# Patient Record
Sex: FEMALE | Race: BLACK OR AFRICAN AMERICAN | Employment: OTHER | URBAN - METROPOLITAN AREA
[De-identification: names, ages, dates, MRNs, and addresses within clinical notes are randomized per-mention and may not be internally consistent; named-entity substitution may affect disease eponyms.]

---

## 2017-01-27 ENCOUNTER — APPOINTMENT (OUTPATIENT)
Dept: INFUSION THERAPY | Age: 67
End: 2017-01-27
Payer: MEDICARE

## 2017-02-01 ENCOUNTER — HOSPITAL ENCOUNTER (OUTPATIENT)
Dept: LAB | Age: 67
Discharge: HOME | End: 2017-02-01
Payer: MEDICARE

## 2017-02-01 ENCOUNTER — HOSPITAL ENCOUNTER (OUTPATIENT)
Dept: INFUSION THERAPY | Age: 67
Discharge: HOME | End: 2017-02-01

## 2017-02-01 DIAGNOSIS — D50.0 IRON DEFICIENCY ANEMIA DUE TO CHRONIC BLOOD LOSS: Chronic | ICD-10-CM

## 2017-02-01 LAB
ALBUMIN SERPL BCP-MCNC: 3.5 G/DL (ref 3.2–4.6)
ALBUMIN/GLOB SERPL: 1 {RATIO} (ref 1.2–3.5)
ALP SERPL-CCNC: 68 U/L (ref 50–136)
ALT SERPL-CCNC: 22 U/L (ref 12–65)
ANION GAP BLD CALC-SCNC: 5 MMOL/L (ref 7–16)
AST SERPL W P-5'-P-CCNC: 16 U/L (ref 15–37)
BASOPHILS # BLD AUTO: 0.1 K/UL (ref 0–0.2)
BASOPHILS # BLD: 1 % (ref 0–2)
BILIRUB SERPL-MCNC: 0.7 MG/DL (ref 0.2–1.1)
BUN SERPL-MCNC: 32 MG/DL (ref 8–23)
CALCIUM SERPL-MCNC: 9.1 MG/DL (ref 8.3–10.4)
CHLORIDE SERPL-SCNC: 96 MMOL/L (ref 98–107)
CO2 SERPL-SCNC: 39 MMOL/L (ref 23–32)
CREAT SERPL-MCNC: 1.53 MG/DL (ref 0.6–1)
DIFFERENTIAL METHOD BLD: ABNORMAL
EOSINOPHIL # BLD: 0.1 K/UL (ref 0–0.8)
EOSINOPHIL NFR BLD: 2 % (ref 0.5–7.8)
ERYTHROCYTE [DISTWIDTH] IN BLOOD BY AUTOMATED COUNT: 15.2 % (ref 11.9–14.6)
FERRITIN SERPL-MCNC: 20 NG/ML (ref 8–388)
GLOBULIN SER CALC-MCNC: 3.6 G/DL (ref 2.3–3.5)
GLUCOSE SERPL-MCNC: 207 MG/DL (ref 65–100)
HCT VFR BLD AUTO: 35.3 % (ref 35.8–46.3)
HGB BLD-MCNC: 10.6 G/DL (ref 11.7–15.4)
IRON SATN MFR SERPL: 14 %
IRON SERPL-MCNC: 48 UG/DL (ref 35–150)
LYMPHOCYTES # BLD AUTO: 20 % (ref 13–44)
LYMPHOCYTES # BLD: 1.4 K/UL (ref 0.5–4.6)
MCH RBC QN AUTO: 28.1 PG (ref 26.1–32.9)
MCHC RBC AUTO-ENTMCNC: 30 G/DL (ref 31.4–35)
MCV RBC AUTO: 93.6 FL (ref 79.6–97.8)
MONOCYTES # BLD: 0.5 K/UL (ref 0.1–1.3)
MONOCYTES NFR BLD AUTO: 8 % (ref 4–12)
NEUTS SEG # BLD: 4.7 K/UL (ref 1.7–8.2)
NEUTS SEG NFR BLD AUTO: 70 % (ref 43–78)
NRBC # BLD: 0 K/UL (ref 0–0.2)
PLATELET # BLD AUTO: 193 K/UL (ref 150–450)
PMV BLD AUTO: 10 FL (ref 10.8–14.1)
POTASSIUM SERPL-SCNC: 4.4 MMOL/L (ref 3.5–5.1)
PROT SERPL-MCNC: 7.1 G/DL (ref 6.3–8.2)
RBC # BLD AUTO: 3.77 M/UL (ref 4.05–5.25)
SODIUM SERPL-SCNC: 140 MMOL/L (ref 136–145)
TIBC SERPL-MCNC: 355 UG/DL (ref 250–450)
WBC # BLD AUTO: 6.8 K/UL (ref 4.3–11.1)

## 2017-02-01 PROCEDURE — 36415 COLL VENOUS BLD VENIPUNCTURE: CPT | Performed by: NURSE PRACTITIONER

## 2017-02-01 PROCEDURE — 83540 ASSAY OF IRON: CPT | Performed by: NURSE PRACTITIONER

## 2017-02-01 PROCEDURE — 85025 COMPLETE CBC W/AUTO DIFF WBC: CPT | Performed by: NURSE PRACTITIONER

## 2017-02-01 PROCEDURE — 82728 ASSAY OF FERRITIN: CPT | Performed by: NURSE PRACTITIONER

## 2017-02-01 PROCEDURE — 80053 COMPREHEN METABOLIC PANEL: CPT | Performed by: NURSE PRACTITIONER

## 2017-02-15 ENCOUNTER — HOSPITAL ENCOUNTER (INPATIENT)
Age: 67
LOS: 1 days | Discharge: HOME HEALTH | DRG: 291 | End: 2017-02-18
Attending: EMERGENCY MEDICINE | Admitting: INTERNAL MEDICINE
Payer: MEDICARE

## 2017-02-15 ENCOUNTER — APPOINTMENT (OUTPATIENT)
Dept: GENERAL RADIOLOGY | Age: 67
DRG: 291 | End: 2017-02-15
Attending: EMERGENCY MEDICINE
Payer: MEDICARE

## 2017-02-15 DIAGNOSIS — R07.9 CHEST PAIN, UNSPECIFIED TYPE: Primary | ICD-10-CM

## 2017-02-15 DIAGNOSIS — R06.00 DYSPNEA, UNSPECIFIED TYPE: ICD-10-CM

## 2017-02-15 LAB
ALBUMIN SERPL BCP-MCNC: 3.5 G/DL (ref 3.2–4.6)
ALBUMIN/GLOB SERPL: 1 {RATIO} (ref 1.2–3.5)
ALP SERPL-CCNC: 64 U/L (ref 50–136)
ALT SERPL-CCNC: 54 U/L (ref 12–65)
ANION GAP BLD CALC-SCNC: 5 MMOL/L (ref 7–16)
AST SERPL W P-5'-P-CCNC: 55 U/L (ref 15–37)
ATRIAL RATE: 62 BPM
BASOPHILS # BLD AUTO: 0 K/UL (ref 0–0.2)
BASOPHILS # BLD: 0 % (ref 0–2)
BILIRUB SERPL-MCNC: 0.8 MG/DL (ref 0.2–1.1)
BNP SERPL-MCNC: 621 PG/ML
BUN SERPL-MCNC: 33 MG/DL (ref 8–23)
CALCIUM SERPL-MCNC: 8.9 MG/DL (ref 8.3–10.4)
CALCULATED P AXIS, ECG09: 36 DEGREES
CALCULATED R AXIS, ECG10: -46 DEGREES
CALCULATED T AXIS, ECG11: 124 DEGREES
CHLORIDE SERPL-SCNC: 100 MMOL/L (ref 98–107)
CO2 SERPL-SCNC: 39 MMOL/L (ref 21–32)
CREAT SERPL-MCNC: 1.51 MG/DL (ref 0.6–1)
DIAGNOSIS, 93000: NORMAL
DIASTOLIC BP, ECG02: NORMAL MMHG
DIFFERENTIAL METHOD BLD: ABNORMAL
EOSINOPHIL # BLD: 0.1 K/UL (ref 0–0.8)
EOSINOPHIL NFR BLD: 1 % (ref 0.5–7.8)
ERYTHROCYTE [DISTWIDTH] IN BLOOD BY AUTOMATED COUNT: 15.4 % (ref 11.9–14.6)
GLOBULIN SER CALC-MCNC: 3.4 G/DL (ref 2.3–3.5)
GLUCOSE BLD STRIP.AUTO-MCNC: 117 MG/DL (ref 65–100)
GLUCOSE SERPL-MCNC: 200 MG/DL (ref 65–100)
HCT VFR BLD AUTO: 35 % (ref 35.8–46.3)
HGB BLD-MCNC: 10.6 G/DL (ref 11.7–15.4)
IMM GRANULOCYTES # BLD: 0 K/UL (ref 0–0.5)
IMM GRANULOCYTES NFR BLD AUTO: 0.2 % (ref 0–5)
INR PPP: 1.9 (ref 0.9–1.2)
LYMPHOCYTES # BLD AUTO: 18 % (ref 13–44)
LYMPHOCYTES # BLD: 1.1 K/UL (ref 0.5–4.6)
MCH RBC QN AUTO: 28.3 PG (ref 26.1–32.9)
MCHC RBC AUTO-ENTMCNC: 30.3 G/DL (ref 31.4–35)
MCV RBC AUTO: 93.3 FL (ref 79.6–97.8)
MONOCYTES # BLD: 0.5 K/UL (ref 0.1–1.3)
MONOCYTES NFR BLD AUTO: 8 % (ref 4–12)
NEUTS SEG # BLD: 4.5 K/UL (ref 1.7–8.2)
NEUTS SEG NFR BLD AUTO: 73 % (ref 43–78)
P-R INTERVAL, ECG05: 200 MS
PLATELET # BLD AUTO: 177 K/UL (ref 150–450)
PMV BLD AUTO: 10.6 FL (ref 10.8–14.1)
POTASSIUM SERPL-SCNC: 5 MMOL/L (ref 3.5–5.1)
PROT SERPL-MCNC: 6.9 G/DL (ref 6.3–8.2)
PROTHROMBIN TIME: 20.8 SEC (ref 9.6–12)
Q-T INTERVAL, ECG07: 410 MS
QRS DURATION, ECG06: 124 MS
QTC CALCULATION (BEZET), ECG08: 416 MS
RBC # BLD AUTO: 3.75 M/UL (ref 4.05–5.25)
SODIUM SERPL-SCNC: 144 MMOL/L (ref 136–145)
SYSTOLIC BP, ECG01: NORMAL MMHG
TROPONIN I BLD-MCNC: 0.04 NG/ML (ref 0–0.08)
TROPONIN I BLD-MCNC: 0.05 NG/ML (ref 0–0.08)
VENTRICULAR RATE, ECG03: 62 BPM
WBC # BLD AUTO: 6.2 K/UL (ref 4.3–11.1)

## 2017-02-15 PROCEDURE — 83880 ASSAY OF NATRIURETIC PEPTIDE: CPT | Performed by: EMERGENCY MEDICINE

## 2017-02-15 PROCEDURE — 99285 EMERGENCY DEPT VISIT HI MDM: CPT | Performed by: EMERGENCY MEDICINE

## 2017-02-15 PROCEDURE — 82962 GLUCOSE BLOOD TEST: CPT

## 2017-02-15 PROCEDURE — 74011000250 HC RX REV CODE- 250: Performed by: INTERNAL MEDICINE

## 2017-02-15 PROCEDURE — 74011250636 HC RX REV CODE- 250/636: Performed by: EMERGENCY MEDICINE

## 2017-02-15 PROCEDURE — 84484 ASSAY OF TROPONIN QUANT: CPT

## 2017-02-15 PROCEDURE — 77010033678 HC OXYGEN DAILY

## 2017-02-15 PROCEDURE — 94760 N-INVAS EAR/PLS OXIMETRY 1: CPT

## 2017-02-15 PROCEDURE — 74011250636 HC RX REV CODE- 250/636: Performed by: INTERNAL MEDICINE

## 2017-02-15 PROCEDURE — 93005 ELECTROCARDIOGRAM TRACING: CPT | Performed by: EMERGENCY MEDICINE

## 2017-02-15 PROCEDURE — 74011250637 HC RX REV CODE- 250/637: Performed by: INTERNAL MEDICINE

## 2017-02-15 PROCEDURE — 85610 PROTHROMBIN TIME: CPT | Performed by: INTERNAL MEDICINE

## 2017-02-15 PROCEDURE — 80053 COMPREHEN METABOLIC PANEL: CPT | Performed by: EMERGENCY MEDICINE

## 2017-02-15 PROCEDURE — 96374 THER/PROPH/DIAG INJ IV PUSH: CPT | Performed by: EMERGENCY MEDICINE

## 2017-02-15 PROCEDURE — 94640 AIRWAY INHALATION TREATMENT: CPT

## 2017-02-15 PROCEDURE — 99218 HC RM OBSERVATION: CPT

## 2017-02-15 PROCEDURE — 85025 COMPLETE CBC W/AUTO DIFF WBC: CPT | Performed by: EMERGENCY MEDICINE

## 2017-02-15 PROCEDURE — 71020 XR CHEST PA LAT: CPT

## 2017-02-15 RX ORDER — ESCITALOPRAM OXALATE 10 MG/1
10 TABLET ORAL DAILY
Status: DISCONTINUED | OUTPATIENT
Start: 2017-02-16 | End: 2017-02-18 | Stop reason: HOSPADM

## 2017-02-15 RX ORDER — FUROSEMIDE 10 MG/ML
20 INJECTION INTRAMUSCULAR; INTRAVENOUS
Status: COMPLETED | OUTPATIENT
Start: 2017-02-15 | End: 2017-02-15

## 2017-02-15 RX ORDER — NITROGLYCERIN 0.4 MG/1
0.4 TABLET SUBLINGUAL AS NEEDED
Status: DISCONTINUED | OUTPATIENT
Start: 2017-02-15 | End: 2017-02-18 | Stop reason: HOSPADM

## 2017-02-15 RX ORDER — SODIUM CHLORIDE 0.9 % (FLUSH) 0.9 %
5-10 SYRINGE (ML) INJECTION AS NEEDED
Status: DISCONTINUED | OUTPATIENT
Start: 2017-02-15 | End: 2017-02-18 | Stop reason: HOSPADM

## 2017-02-15 RX ORDER — LISINOPRIL 5 MG/1
2.5 TABLET ORAL DAILY
Status: DISCONTINUED | OUTPATIENT
Start: 2017-02-16 | End: 2017-02-16

## 2017-02-15 RX ORDER — WARFARIN SODIUM 5 MG/1
5 TABLET ORAL
Status: DISCONTINUED | OUTPATIENT
Start: 2017-02-15 | End: 2017-02-18 | Stop reason: HOSPADM

## 2017-02-15 RX ORDER — FUROSEMIDE 10 MG/ML
60 INJECTION INTRAMUSCULAR; INTRAVENOUS 2 TIMES DAILY
Status: DISCONTINUED | OUTPATIENT
Start: 2017-02-15 | End: 2017-02-15 | Stop reason: SDUPTHER

## 2017-02-15 RX ORDER — CARVEDILOL 6.25 MG/1
6.25 TABLET ORAL 2 TIMES DAILY WITH MEALS
Status: DISCONTINUED | OUTPATIENT
Start: 2017-02-16 | End: 2017-02-18

## 2017-02-15 RX ORDER — BUDESONIDE 0.5 MG/2ML
500 INHALANT ORAL
Status: DISCONTINUED | OUTPATIENT
Start: 2017-02-15 | End: 2017-02-18 | Stop reason: HOSPADM

## 2017-02-15 RX ORDER — MORPHINE SULFATE 2 MG/ML
2 INJECTION, SOLUTION INTRAMUSCULAR; INTRAVENOUS
Status: DISCONTINUED | OUTPATIENT
Start: 2017-02-15 | End: 2017-02-18 | Stop reason: HOSPADM

## 2017-02-15 RX ORDER — WARFARIN 2.5 MG/1
2.5 TABLET ORAL ONCE
Status: COMPLETED | OUTPATIENT
Start: 2017-02-16 | End: 2017-02-16

## 2017-02-15 RX ORDER — FERROUS GLUCONATE 324(38)MG
1 TABLET ORAL
Status: DISCONTINUED | OUTPATIENT
Start: 2017-02-16 | End: 2017-02-18 | Stop reason: HOSPADM

## 2017-02-15 RX ORDER — ALBUTEROL SULFATE 90 UG/1
2 AEROSOL, METERED RESPIRATORY (INHALATION)
Status: DISCONTINUED | OUTPATIENT
Start: 2017-02-15 | End: 2017-02-15 | Stop reason: ALTCHOICE

## 2017-02-15 RX ORDER — ALBUTEROL SULFATE 0.83 MG/ML
2.5 SOLUTION RESPIRATORY (INHALATION)
Status: DISCONTINUED | OUTPATIENT
Start: 2017-02-15 | End: 2017-02-18 | Stop reason: HOSPADM

## 2017-02-15 RX ORDER — ASPIRIN 81 MG/1
81 TABLET ORAL
Status: DISCONTINUED | OUTPATIENT
Start: 2017-02-16 | End: 2017-02-18 | Stop reason: HOSPADM

## 2017-02-15 RX ORDER — ALBUTEROL SULFATE 2.5 MG/.5ML
2.5 SOLUTION RESPIRATORY (INHALATION)
Status: DISCONTINUED | OUTPATIENT
Start: 2017-02-15 | End: 2017-02-18 | Stop reason: HOSPADM

## 2017-02-15 RX ORDER — FUROSEMIDE 10 MG/ML
40 INJECTION INTRAMUSCULAR; INTRAVENOUS
Status: COMPLETED | OUTPATIENT
Start: 2017-02-15 | End: 2017-02-15

## 2017-02-15 RX ORDER — FUROSEMIDE 10 MG/ML
60 INJECTION INTRAMUSCULAR; INTRAVENOUS 2 TIMES DAILY
Status: DISCONTINUED | OUTPATIENT
Start: 2017-02-16 | End: 2017-02-16

## 2017-02-15 RX ORDER — SIMVASTATIN 20 MG/1
20 TABLET, FILM COATED ORAL
Status: DISCONTINUED | OUTPATIENT
Start: 2017-02-16 | End: 2017-02-18 | Stop reason: HOSPADM

## 2017-02-15 RX ORDER — SODIUM CHLORIDE 0.9 % (FLUSH) 0.9 %
5-10 SYRINGE (ML) INJECTION EVERY 8 HOURS
Status: DISCONTINUED | OUTPATIENT
Start: 2017-02-15 | End: 2017-02-18 | Stop reason: HOSPADM

## 2017-02-15 RX ORDER — ISOSORBIDE MONONITRATE 60 MG/1
60 TABLET, EXTENDED RELEASE ORAL DAILY
Status: DISCONTINUED | OUTPATIENT
Start: 2017-02-16 | End: 2017-02-17

## 2017-02-15 RX ADMIN — ALBUTEROL SULFATE 2.5 MG: 2.5 SOLUTION RESPIRATORY (INHALATION) at 22:15

## 2017-02-15 RX ADMIN — BUDESONIDE 500 MCG: 0.5 INHALANT RESPIRATORY (INHALATION) at 22:15

## 2017-02-15 RX ADMIN — FUROSEMIDE 20 MG: 10 INJECTION, SOLUTION INTRAMUSCULAR; INTRAVENOUS at 21:59

## 2017-02-15 RX ADMIN — WARFARIN SODIUM 5 MG: 5 TABLET ORAL at 21:59

## 2017-02-15 RX ADMIN — Medication 10 ML: at 21:59

## 2017-02-15 RX ADMIN — FUROSEMIDE 40 MG: 10 INJECTION, SOLUTION INTRAMUSCULAR; INTRAVENOUS at 17:46

## 2017-02-15 NOTE — ED NOTES
Pt back to bed at this time after getting up to bedside commode to urinate after Lasix. When pt back to bed taking pt longer and longer everytime she moves to recover and slow her breathing. Pts oxygen sats on her normal 4L dropped into the mid 80's for several mins before finally recovering to her normal 95% on 4L. MD updated.

## 2017-02-15 NOTE — ED PROVIDER NOTES
HPI Comments: 3-year-old -American female who reports a history of COPD, CHF, cardiac stenting as well as aortic valve replacement and pacemaker/defibrillator placement presents with 2 days of shortness of breath, orthopnea and intermittent chest pain. She has used nitroglycerin twice in the past 2 days. Family states this is the first time she has had to use nitroglycerin in over 2 months. She denies cough. She does report that she feels generally weak and dizzy. Patient is a 74 y.o. female presenting with shortness of breath. The history is provided by the patient. Shortness of Breath   Associated symptoms include chest pain and leg swelling. Pertinent negatives include no fever, no neck pain, no cough, no wheezing, no vomiting, no abdominal pain and no rash. Past Medical History:   Diagnosis Date    Acute-on-chronic respiratory failure ([address] July, 2013     Hospitalized    Anticoagulated on Coumadin 7/9/2013     S/P AVR     Aortic valve disorders     Atrial fibrillation (HCC)     Benign hypertension      controlled    CAD (coronary artery disease) 1/20/2013 5/8/14 PCI LAD with stent placed     Cardiac arrest [facility]) May,  2014     Had cardiac arrest while receiving OP infusion. Subsequently found to have Iinferior STEMI.     Cardiomyopathy ([address]     Chest pain     Chronic combined systolic and diastolic CHF (congestive heart failure) (HCC)     Chronic obstructive pulmonary disease (HCC)     Chronic respiratory failure (HCC) 4/2/2014    Chronic systolic heart failure ([address] 1/20/2013 5/8/14 ECHO:  EF 10-15%     CKD (chronic kidney disease) stage 3, GFR 30-59 ml/min 7/10/2013    COPD (chronic obstructive pulmonary disease) (HCC) 4/2/2014    Coronary atherosclerosis of native coronary vessel     Diabetes ([address]     Diabetes mellitus type 2, insulin dependent ([address] 1/14/7167    Diastolic heart failure ([address]     Encounter for immunization 12/14/2015   [facility] Essential hypertension 12/14/2015    Essential hypertension, benign 1/20/2013    GERD (gastroesophageal reflux disease)     Glucose intolerance (pre-diabetes) 12/14/2015    H/O aortic valve repair 1995 & 2005    H/O prosthetic aortic valve replacement      Mechanical/Artific    Heart failure ([address]     HLD (hyperlipidemia)     Hypothyroidism (acquired)     Hypoxemia 7/9/2013    ICD (implantable cardioverter-defibrillator) in place 10/2/2014     Biotronik single-chamber ICD implantation 10/20/14     Iron deficiency anemia due to chronic blood loss 7/29/2009    Iron deficiency anemia secondary to blood loss (chronic) 7/29/2009    Ischemic heart disease     LV dysfunction      Weak heart/myocardial fx/ht failure    MDS (myelodysplastic syndrome) ([address] 12/17/2011     Procrit started in August, 2011 12/18/11 Procrit weekly and Iron stores. 5-12 12-13-12 good response to 3 weekly procrit did not need it last time  3-7-13 Pt doing well. Just wanted a \"check-up. \" Responding to Procrit every three weeks.  8-29-13 patient has missed some injections on recently restarted hemoglobin only issue , takes oral iron iron stores each time       Memory loss     Mitral insufficiency, aortic stenosis combined     Morbid obesity ([address] 7/9/2013    REJI (obstructive sleep apnea)-cpap 4/2/2014    Osteoarthritis     Osteopenia     Paroxysmal supraventricular tachycardia (HCC)     Pulmonary hypertension ([address] 4/14/2014    Quadrantanopia     Rheumatic aortic stenosis     Rheumatic tricuspid regurgitation      insuff    S/P AVR (aortic valve replacement) 1/30/2016    Secondary pulmonary hypertension ([address]     Shoulder pain, acute 12/14/2015    Shoulder pain, right 12/14/2015    Tachycardia      Rapid H/B     Tobacco use disorder     Visual complaint 12/14/2015       Past Surgical History:   Procedure Laterality Date    Cardiac catheterization  2009    Ir bx bone marrow  7/2011    Hx aortic valve replacement  1995, 2005     mechanical valve     Hx  section      Hx tubal ligation      Hx heart catheterization      Hx coronary stent placement  May, 2014     STEMI with Stent placement.  Hx pacemaker  10/2/2014     Biotronik ICD    Hx endoscopy  2009     EGD         Family History:   Problem Relation Age of Onset    Heart Disease Mother      CHF    Hypertension Mother     Kidney Disease Mother     Heart Disease Father      CHF    Lung Disease Father     Diabetes Father     Cancer Father      prostate    Hypertension Father     Heart Attack Father     Heart Disease Maternal Aunt     Diabetes Brother     Coronary Artery Disease Other     Breast Cancer Neg Hx        Social History     Social History    Marital status:      Spouse name: N/A    Number of children: N/A    Years of education: N/A     Occupational History     Retired     deli     Social History Main Topics    Smoking status: Former Smoker     Packs/day: 0.25     Years: 42.00     Types: Cigarettes     Start date: 10/1/1956     Quit date: 2014    Smokeless tobacco: Never Used    Alcohol use No    Drug use: No    Sexual activity: Not on file     Other Topics Concern    Weight Concern Yes    Special Diet Yes     Social History Narrative    Lives with her daughter. Ambulates only short distances. ALLERGIES: Sulfa (sulfonamide antibiotics) and Other medication    Review of Systems   Constitutional: Negative for fever. HENT: Negative for congestion. Respiratory: Positive for shortness of breath. Negative for cough and wheezing. Cardiovascular: Positive for chest pain and leg swelling. Gastrointestinal: Negative for abdominal pain, nausea and vomiting. Genitourinary: Negative for dysuria. Musculoskeletal: Negative for back pain and neck pain. Skin: Negative for rash. Neurological: Positive for dizziness and weakness.        Vitals:    02/15/17 1304 02/15/17 1520 02/15/17 1522 02/15/17 1600 BP: 122/74 146/77  156/74   Pulse: 66  62 73   Resp: 18   20   Temp: 98.7 °F (37.1 °C)      SpO2: 97%  95% 98%   Weight: 117.9 kg (260 lb)      Height: 5' 2\" (1.575 m)               Physical Exam   Constitutional: She is oriented to person, place, and time. She appears well-developed and well-nourished. No distress. HENT:   Head: Normocephalic and atraumatic. Mouth/Throat: Oropharynx is clear and moist. No oropharyngeal exudate. Eyes: Conjunctivae are normal. Pupils are equal, round, and reactive to light. Neck: Normal range of motion. Neck supple. Cardiovascular: Normal rate and regular rhythm. No murmur heard. Mechanical click   Pulmonary/Chest: Effort normal and breath sounds normal. She has no wheezes. She has no rales. Abdominal: Soft. She exhibits no distension. There is no tenderness. Musculoskeletal: Normal range of motion. She exhibits edema. Neurological: She is alert and oriented to person, place, and time. Skin: Skin is warm and dry. Psychiatric: She has a normal mood and affect. Nursing note and vitals reviewed. MDM  Number of Diagnoses or Management Options  Diagnosis management comments: EKG shows a sinus rhythm with occasional PVC. She has left axis deviation and LVH. No definite change compared to previous EKG. Chest x-ray shows cardiomegaly without acute CHF. CBC shows a mild anemia hemoglobin 10.6 and hematocrit 35. Basic panel shows mild renal sufficiency creatinine 1.51, BUN 33 with a glucose of 200 without DKA. LFTs unremarkable. BNP elevated at 621. Troponin 0.04. Her BNP elevation is significantly higher than previous values. She has been treated with Lasix. Last echo available in the system August 2014 indicates an EF of 15-20%. Last heart catheterization in our system to evaluate coronary arteries appears to be 2009. Patient has significant disease and very poor EF.   Her chest pain concerning and in my opinion warrants further workup by cardiology.            Amount and/or Complexity of Data Reviewed  Clinical lab tests: ordered and reviewed  Tests in the radiology section of CPT®: ordered and reviewed  Tests in the medicine section of CPT®: ordered and reviewed      ED Course       Procedures

## 2017-02-15 NOTE — IP AVS SNAPSHOT
Tha Tejada 
 
 
 [address] [facility] [address] 
[phone] Patient: Hortensia Peguero MRN: [id number] RRU:4/1/[birth year]245 You are allergic to the following Allergen Reactions Sulfa (Sulfonamide Antibiotics) Hives Other Medication Nausea Only Cannot take uncoated asa Recent Documentation Height Weight BMI OB Status Smoking Status 1.575 m 101.5 kg 40.93 kg/m2 Postmenopausal Former Smoker Emergency Contacts Name Discharge Info Relation Home Work Mobile Teodoro Llanes  Daughter [21] [phone] [address] CAREGIVER [3] Daughter [21] [phone] Jeanine Rashid DISCHARGE CAREGIVER [3] Friend [5] [phone] About your hospitalization You were admitted on:  February 15, 2017 You last received care in the:  [facility] 3 CLINICAL OBSERVATION You were discharged on:  February 18, 2017 Unit phone number:  [phone] Why you were hospitalized Your primary diagnosis was:  Chronic Combined Systolic And Diastolic Heart Failure (Hcc) Your diagnoses also included:  Pulmonary Hypertension (Hcc), Jamal (Obstructive Sleep Apnea), Mds (Myelodysplastic Syndrome) (Hcc), Icd (Implantable Cardioverter-Defibrillator) In Place, Hld (Hyperlipidemia), Diabetes Mellitus Type 2, Diet-Controlled (Hcc), Copd (Chronic Obstructive Pulmonary Disease) (Hcc), Ckd (Chronic Kidney Disease) Stage 3, Gfr 30-59 Ml/Min, Essential Hypertension, Benign, Anticoagulated On Coumadin, Cad (Coronary Artery Disease), Cardiomyopathy (Hcc), Chronic Respiratory Failure (Hcc), Chf (Congestive Heart Failure) (Hcc) Providers Seen During Your Hospitalizations Provider Role Specialty Primary office phone Yuri Guidry MD Attending Provider Emergency Medicine [phone] Leila Yepez MD Attending Provider Emergency Medicine [phone] Joy Thomas MD Attending Provider Cardiology [phone] Your Primary Care Physician (PCP) Primary Care Physician Office Phone Office Fax Axel Guillen [phone] Follow-up Information Follow up With Details Comments Contact Info MD Rodrigo Merino [address] [facility] [address] 
[phone] Solomon Barksdale MD Schedule an appointment as soon as possible for a visit Office will call Monday for appointment Rodrigo [address] [facility] [address] 
[phone] Your Appointments Thursday February 23, 2017  1:15 PM EST ANTICOAG with Brayan Martinez PT [facility] ([address] [place] [address]) [address] 
[address] [facility]  
[phone] Wednesday March 01, 2017  3:00 PM EST Injection with NUR3  
ST. [address] ([address] ) [address] [phone] [facility] [address]  
[phone] [address] Wednesday March 29, 2017 10:10 AM EDT  
REMOTE DEVICE CHECK ORDERS ONLY ENCOUNTER with NAIMA GARCIA AICD 62 [facility] ([address] [place] [address]) [address] 
[address] [facility]  
[phone] Wednesday March 29, 2017  2:00 PM EDT Injection with [place] [address] [phone] [facility] [address]  
[phone] [address] Current Discharge Medication List  
  
START taking these medications Dose & Instructions Dispensing Information Comments Morning Noon Evening Bedtime  
 ciprofloxacin HCl 0.3 % ophthalmic solution Commonly known as:  Allegra Rick Your next dose is: Today, Tomorrow Other:  _________ Dose:  1 Drop Administer 1 Drop to both eyes every four (4) hours. Quantity:  30 mL Refills:  0  
     
   
   
   
  
 spironolactone 25 mg tablet Commonly known as:  ALDACTONE Your next dose is: Today, Tomorrow Other:  _________ Dose:  25 mg Take 1 Tab by mouth daily. Quantity:  30 Tab Refills:  6 CONTINUE these medications which have CHANGED Dose & Instructions Dispensing Information Comments Morning Noon Evening Bedtime  
 carvedilol 12.5 mg tablet Commonly known as:  Vargas Gut What changed:   
- medication strength 
- how much to take Your next dose is: Today, Tomorrow Other:  _________ Dose:  12.5 mg Take 1 Tab by mouth two (2) times daily (with meals). Indications: hypertension Quantity:  60 Tab Refills:  6  
     
   
   
   
  
 furosemide 40 mg tablet Commonly known as:  LASIX What changed:   
- how much to take 
- how to take this - when to take this 
- additional instructions Your next dose is: Today, Tomorrow Other:  _________ Dose:  80 mg Take 2 Tabs by mouth two (2) times a day. Quantity:  120 Tab Refills:  6  
     
   
   
   
  
 lisinopril 10 mg tablet Commonly known as:  Bry Hooker What changed:   
- medication strength 
- how much to take - when to take this Your next dose is: Today, Tomorrow Other:  _________ Dose:  10 mg Take 1 Tab by mouth two (2) times a day. Indications: hypertension Quantity:  60 Tab Refills:  6 CONTINUE these medications which have NOT CHANGED Dose & Instructions Dispensing Information Comments Morning Noon Evening Bedtime  
 acetaminophen 325 mg tablet Commonly known as:  TYLENOL Your next dose is: Today, Tomorrow Other:  _________ Dose:  650 mg Take 650 mg by mouth every four (4) hours as needed for Pain. Refills:  0 * albuterol 2.5 mg /3 mL (0.083 %) nebulizer solution Commonly known as:  PROVENTIL VENTOLIN Your next dose is: Today, Tomorrow Other:  _________ Dose:  2.5 mg  
3 mL by Nebulization route every four (4) hours as needed for Wheezing. Quantity:  24 Each Refills:  11  
     
   
   
   
  
 * albuterol 90 mcg/actuation inhaler Commonly known as:  VENTOLIN HFA Your next dose is: Today, Tomorrow Other:  _________ 2 puffs qid prn Quantity:  1 Inhaler Refills:  11  
     
   
   
   
  
 ASPIR-81 81 mg tablet Generic drug:  aspirin delayed-release Your next dose is: Today, Tomorrow Other:  _________ Dose:  81 mg Take 81 mg by mouth every morning. Refills:  0  
     
   
   
   
  
 calcium citrate 200 mg (950 mg) tablet Your next dose is: Today, Tomorrow Other:  _________ TAKE 1 TAB BY MOUTH TWO (2) TIMES A DAY. Quantity:  180 Tab Refills:  1 cholecalciferol (VITAMIN D3) 5,000 unit Tab tablet Commonly known as:  VITAMIN D3 Your next dose is: Today, Tomorrow Other:  _________ Dose:  5000 Units Take 1 Tab by mouth daily. Quantity:  90 Tab Refills:  4  
     
   
   
   
  
 escitalopram oxalate 10 mg tablet Commonly known as:  Pollyann Collingsworth Your next dose is: Today, Tomorrow Other:  _________ Dose:  10 mg Take 1 Tab by mouth daily. Indications: ANXIETY WITH DEPRESSION Quantity:  90 Tab Refills:  3  
     
   
   
   
  
 ferrous gluconate 324 mg (38 mg iron) tablet Your next dose is: Today, Tomorrow Other:  _________ TAKE 1 TABLET BY MOUTH ONCE DAILY Quantity:  90 Tab Refills:  0  
     
   
   
   
  
 fluticasone 50 mcg/actuation nasal spray Commonly known as:  Rollins Fails Your next dose is: Today, Tomorrow Other:  _________ Dose:  2 Spray 2 Sprays by Both Nostrils route daily as needed. Refills:  10  
     
   
   
   
  
 isosorbide mononitrate ER 30 mg tablet Commonly known as:  IMDUR Your next dose is: Today, Tomorrow Other:  _________ TAKE 1 TAB BY MOUTH EVERY MORNING. Quantity:  30 Tab Refills:  11  
     
   
   
   
  
 mometasone-formoterol 200-5 mcg/actuation HFA inhaler Commonly known as:  Dileep Sánchez Your next dose is: Today, Tomorrow Other:  _________ 2 puffs bid, rinse mouth after use. Quantity:  1 Inhaler Refills:  11  
     
   
   
   
  
 nitroglycerin 0.4 mg SL tablet Commonly known as:  NITROSTAT Your next dose is: Today, Tomorrow Other:  _________ Dose:  0.4 mg  
0.4 mg by SubLINGual route every five (5) minutes as needed. Refills:  0 OXYGEN-AIR DELIVERY SYSTEMS Your next dose is: Today, Tomorrow Other:  _________ Dose:  3 L  
3 L by Nasal route. Refills:  0  
     
   
   
   
  
 simvastatin 20 mg tablet Commonly known as:  ZOCOR Your next dose is: Today, Tomorrow Other:  _________ TAKE 1 TABLET EVERY DAY Quantity:  90 Tab Refills:  0  
     
   
   
   
  
 tiotropium 18 mcg inhalation capsule Commonly known as:  [address] Your next dose is: Today, Tomorrow Other:  _________ Dose:  1 Cap Take 1 Cap by inhalation daily. Indications: PREVENTION OF BRONCHOSPASMS WITH EMPHYSEMA Quantity:  90 Cap Refills:  4  
     
   
   
   
  
 traMADol 50 mg tablet Commonly known as:  ULTRAM  
   
Your next dose is: Today, Tomorrow Other:  _________ Dose:  50 mg Take 1 Tab by mouth every four (4) hours as needed for Pain. Max Daily Amount: 300 mg. Indications: PAIN Quantity:  150 Tab Refills:  5 Not to exceed 5 additional fills before 03/16/2016  
    
   
   
   
  
 warfarin 5 mg tablet Commonly known as:  COUMADIN Your next dose is: Today, Tomorrow Other:  _________ TAKE 1 TABLET BY MOUTH AT BEDTIME Quantity:  90 Tab Refills:  0  
     
   
   
   
  
 * Notice: This list has 2 medication(s) that are the same as other medications prescribed for you. Read the directions carefully, and ask your doctor or other care provider to review them with you. Where to Get Your Medications Information on where to get these meds will be given to you by the nurse or doctor. ! Ask your nurse or doctor about these medications  
  carvedilol 12.5 mg tablet  
 ciprofloxacin HCl 0.3 % ophthalmic solution  
 furosemide 40 mg tablet  
 lisinopril 10 mg tablet  
 spironolactone 25 mg tablet Discharge Instructions Heart Failure: Care Instructions Your Care Instructions Heart failure occurs when your heart does not pump as much blood as the body needs. Failure does not mean that the heart has stopped pumping but rather that it is not pumping as well as it should. Over time, this causes fluid buildup in your lungs and other parts of your body. Fluid buildup can cause shortness of breath, fatigue, swollen ankles, and other problems. By taking medicines regularly, reducing sodium (salt) in your diet, checking your weight every day, and making lifestyle changes, you can feel better and live longer. Follow-up care is a key part of your treatment and safety. Be sure to make and go to all appointments, and call your doctor if you are having problems. It's also a good idea to know your test results and keep a list of the medicines you take. How can you care for yourself at home? Medicines · Be safe with medicines. Take your medicines exactly as prescribed. Call your doctor if you think you are having a problem with your medicine.  
· Do not take any vitamins, over-the-counter medicine, or herbal products without talking to your doctor first. [place] [facility] not take ibuprofen (Advil or Motrin) and naproxen (Aleve) without talking to your doctor first. They could make your heart failure worse. · You may be taking some of the following medicine. ¨ Beta-blockers can slow heart rate, decrease blood pressure, and improve your condition. Taking a beta-blocker may lower your chance of needing to be hospitalized. ¨ Angiotensin-converting enzyme inhibitors (ACEIs) reduce the heart's workload, lower blood pressure, and reduce swelling. Taking an ACEI may lower your chance of needing to be hospitalized again. ¨ Angiotensin II receptor blockers (ARBs) work like ACEIs. Your doctor may prescribe them instead of ACEIs. ¨ Diuretics, also called water pills, reduce swelling. ¨ Potassium supplements replace this important mineral, which is sometimes lost with diuretics. ¨ Aspirin and other blood thinners prevent blood clots, which can cause a stroke or heart attack. You will get more details on the specific medicines your doctor prescribes. Diet · Your doctor may suggest that you limit sodium to 2,000 milligrams (mg) a day or less. That is less than 1 teaspoon of salt a day, including all the salt you eat in cooking or in packaged foods. People get most of their sodium from processed foods. Fast food and restaurant meals also tend to be very high in sodium. · Ask your doctor how much liquid you can drink each day. You may have to limit liquids. Weight · Weigh yourself without clothing at the same time each day. Record your weight. Call your doctor if you gain more than 3 pounds in 2 to 3 days. A sudden weight gain may mean that your heart failure is getting worse. Activity level · Start light exercise (if your doctor says it is okay). Even if you can only do a small amount, exercise will help you get stronger, have more energy, and manage your weight and your stress.  Walking is an easy way to get exercise. Start out by walking a little more than you did before. Bit by bit, increase the amount you walk. · When you exercise, watch for signs that your heart is working too hard. You are pushing yourself too hard if you cannot talk while you are exercising. If you become short of breath or dizzy or have chest pain, stop, sit down, and rest. 
· If you feel \"wiped out\" the day after you exercise, walk slower or for a shorter distance until you can work up to a better pace. · Get enough rest at night. Sleeping with 1 or 2 pillows under your upper body and head may help you breathe easier. Lifestyle changes · Do not smoke. Smoking can make a heart condition worse. If you need help quitting, talk to your doctor about stop-smoking programs and medicines. These can increase your chances of quitting for good. Quitting smoking may be the most important step you can take to protect your heart. · Limit alcohol to 2 drinks a day for men and 1 drink a day for women. Too much alcohol can cause health problems. · Avoid getting sick from colds and the flu. Get a pneumococcal vaccine shot. If you have had one before, ask your doctor whether you need another dose. Get a flu shot each year. If you must be around people with colds or the flu, wash your hands often. When should you call for help? Call 911 if you have symptoms of sudden heart failure such as: 
· You have severe trouble breathing. · You cough up pink, foamy mucus. · You have a new irregular or rapid heartbeat. Call your doctor now or seek immediate medical care if: 
· You have new or increased shortness of breath. · You are dizzy or lightheaded, or you feel like you may faint. · You have sudden weight gain, such as 3 pounds or more in 2 to 3 days. · You have increased swelling in your legs, ankles, or feet. · You are suddenly so tired or weak that you cannot do your usual activities. Watch closely for changes in your health, and be sure to contact your doctor if: 
· You develop new symptoms. Where can you learn more? Go to http://macrina-thuy.info/. Enter B413 in the search box to learn more about \"Heart Failure: Care Instructions. \" Current as of: January 27, 2016 Content Version: 11.1 © 1892-9953 RE2. Care instructions adapted under license by [employer] (which disclaims liability or warranty for this information). If you have questions about a medical condition or this instruction, always ask your healthcare professional. [place] [address] any warranty or liability for your use of this information. Heart-Healthy Diet: Care Instructions Your Care Instructions A heart-healthy diet has lots of vegetables, fruits, nuts, beans, and whole grains, and is low in salt. It limits foods that are high in saturated fat, such as meats, cheeses, and fried foods. It may be hard to change your diet, but even small changes can lower your risk of heart attack and heart disease. Follow-up care is a key part of your treatment and safety. Be sure to make and go to all appointments, and call your doctor if you are having problems. It's also a good idea to know your test results and keep a list of the medicines you take. How can you care for yourself at home? Watch your portions · Learn what a serving is. A \"serving\" and a \"portion\" are not always the same thing. Make sure that you are not eating larger portions than are recommended. For example, a serving of pasta is ½ cup. A serving size of meat is 2 to 3 ounces. A 3-ounce serving is about the size of a deck of cards. Measure serving sizes until you are good at [place]" them. Keep in mind that restaurants often serve portions that are 2 or 3 times the size of one serving. · To keep your energy level up and keep you from feeling hungry, eat often but in smaller portions. · Eat only the number of calories you need to stay at a healthy weight. If you need to lose weight, eat fewer calories than your body burns (through exercise and other physical activity). Eat more fruits and vegetables · Eat a variety of fruit and vegetables every day. Dark green, deep orange, red, or yellow fruits and vegetables are especially good for you. Examples include spinach, carrots, peaches, and berries. · Keep carrots, celery, and other veggies handy for snacks. Buy fruit that is in season and store it where you can see it so that you will be tempted to eat it. · Cook dishes that have a lot of veggies in them, such as stir-fries and soups. Limit saturated and trans fat · Read food labels, and try to avoid saturated and trans fats. They increase your risk of heart disease. Trans fat is found in many processed foods such as cookies and crackers. · Use olive or canola oil when you cook. Try cholesterol-lowering spreads, such as Benecol or Take Control. · Bake, broil, grill, or steam foods instead of frying them. · Choose lean meats instead of high-fat meats such as hot dogs and sausages. Cut off all visible fat when you prepare meat. · Eat fish, skinless poultry, and meat alternatives such as soy products instead of high-fat meats. Soy products, such as tofu, may be especially good for your heart. · Choose low-fat or fat-free milk and dairy products. Eat fish · Eat at least two servings of fish a week. Certain fish, such as salmon and tuna, contain omega-3 fatty acids, which may help reduce your risk of heart attack. Eat foods high in fiber · Eat a variety of grain products every day. Include whole-grain foods that have lots of fiber and nutrients. Examples of whole-grain foods include oats, whole wheat bread, and brown rice. · Buy whole-grain breads and cereals, instead of white bread or pastries. Limit salt and sodium · Limit how much salt and sodium you eat to help lower your blood pressure. · Taste food before you salt it. Add only a little salt when you think you need it. With time, your taste buds will adjust to less salt. · Eat fewer snack items, fast foods, and other high-salt, processed foods. Check food labels for the amount of sodium in packaged foods. · Choose low-sodium versions of canned goods (such as soups, vegetables, and beans). Limit sugar · Limit drinks and foods with added sugar. These include candy, desserts, and soda pop. Limit alcohol · Limit alcohol to no more than 2 drinks a day for men and 1 drink a day for women. Too much alcohol can cause health problems. When should you call for help? Watch closely for changes in your health, and be sure to contact your doctor if: 
· You would like help planning heart-healthy meals. Where can you learn more? Go to http://macrinaStorage By The Boxthuy.info/. Enter V137 in the search box to learn more about \"Heart-Healthy Diet: Care Instructions. \" Current as of: January 27, 2016 Content Version: 11.1 © 8867-2314 [employer]. Care instructions adapted under license by [employer] (which disclaims liability or warranty for this information). If you have questions about a medical condition or this instruction, always ask your healthcare professional. [place] [address] any warranty or liability for your use of this information. Discharge Orders Procedure Order Date Status Priority Quantity Spec Type Associated Dx METABOLIC PANEL, BASIC 66/25/88 0925 Future Routine 1 Blood [employer] Announcement We are excited to announce that we are making your provider's discharge notes available to you in [employer]. You will see these notes when they are completed and signed by the physician that discharged you from your recent hospital stay.   If you have any questions or concerns about any information you see in Contests4Causest, please call the [facility] Department where you were seen or reach out to your Primary Care Provider for more information about your plan of care. General Information Please provide this summary of care documentation to your next provider. Patient Signature:  ____________________________________________________________ Date:  ____________________________________________________________  
  
Elian Mais Provider Signature:  ____________________________________________________________ Date:  ____________________________________________________________

## 2017-02-15 NOTE — IP AVS SNAPSHOT
Current Discharge Medication List  
  
Take these medications at their scheduled times Dose & Instructions Dispensing Information Comments Morning Noon Evening Bedtime ASPIR-81 81 mg tablet Generic drug:  aspirin delayed-release Your next dose is: Today, Tomorrow Other:  ____________ Dose:  81 mg Take 81 mg by mouth every morning. Refills:  0  
     
   
   
   
  
 carvedilol 12.5 mg tablet Commonly known as:  Erin Buster Your next dose is: Today, Tomorrow Other:  ____________ Dose:  12.5 mg Take 1 Tab by mouth two (2) times daily (with meals). Indications: hypertension Quantity:  60 Tab Refills:  6 cholecalciferol (VITAMIN D3) 5,000 unit Tab tablet Commonly known as:  VITAMIN D3 Your next dose is: Today, Tomorrow Other:  ____________ Dose:  5000 Units Take 1 Tab by mouth daily. Quantity:  90 Tab Refills:  4  
     
   
   
   
  
 ciprofloxacin HCl 0.3 % ophthalmic solution Commonly known as:  Little Cera Your next dose is: Today, Tomorrow Other:  ____________ Dose:  1 Drop Administer 1 Drop to both eyes every four (4) hours. Quantity:  30 mL Refills:  0  
     
   
   
   
  
 escitalopram oxalate 10 mg tablet Commonly known as:  Abdulkadirmarlen Lovetto Your next dose is: Today, Tomorrow Other:  ____________ Dose:  10 mg Take 1 Tab by mouth daily. Indications: ANXIETY WITH DEPRESSION Quantity:  90 Tab Refills:  3  
     
   
   
   
  
 furosemide 40 mg tablet Commonly known as:  LASIX Your next dose is: Today, Tomorrow Other:  ____________ Dose:  80 mg Take 2 Tabs by mouth two (2) times a day. Quantity:  120 Tab Refills:  6  
     
   
   
   
  
 lisinopril 10 mg tablet Commonly known as:  Bela Matson Your next dose is: Today, Tomorrow Other:  ____________ Dose:  10 mg Take 1 Tab by mouth two (2) times a day. Indications: hypertension Quantity:  60 Tab Refills:  6  
     
   
   
   
  
 spironolactone 25 mg tablet Commonly known as:  ALDACTONE Your next dose is: Today, Tomorrow Other:  ____________ Dose:  25 mg Take 1 Tab by mouth daily. Quantity:  30 Tab Refills:  6  
     
   
   
   
  
 tiotropium 18 mcg inhalation capsule Commonly known as:  [address] Your next dose is: Today, Tomorrow Other:  ____________ Dose:  1 Cap Take 1 Cap by inhalation daily. Indications: PREVENTION OF BRONCHOSPASMS WITH EMPHYSEMA Quantity:  90 Cap Refills:  4 Take these medications as needed Dose & Instructions Dispensing Information Comments Morning Noon Evening Bedtime  
 acetaminophen 325 mg tablet Commonly known as:  TYLENOL Your next dose is: Today, Tomorrow Other:  ____________ Dose:  650 mg Take 650 mg by mouth every four (4) hours as needed for Pain. Refills:  0  
     
   
   
   
  
 * albuterol 2.5 mg /3 mL (0.083 %) nebulizer solution Commonly known as:  PROVENTIL VENTOLIN Your next dose is: Today, Tomorrow Other:  ____________ Dose:  2.5 mg  
3 mL by Nebulization route every four (4) hours as needed for Wheezing. Quantity:  24 Each Refills:  11  
     
   
   
   
  
 fluticasone 50 mcg/actuation nasal spray Commonly known as:  Shelvia Birks Your next dose is: Today, Tomorrow Other:  ____________ Dose:  2 Spray 2 Sprays by Both Nostrils route daily as needed. Refills:  10  
     
   
   
   
  
 nitroglycerin 0.4 mg SL tablet Commonly known as:  NITROSTAT Your next dose is: Today, Tomorrow Other:  ____________ Dose:  0.4 mg  
0.4 mg by SubLINGual route every five (5) minutes as needed. Refills:  0 traMADol 50 mg tablet Commonly known as:  ULTRAM  
   
Your next dose is: Today, Tomorrow Other:  ____________ Dose:  50 mg Take 1 Tab by mouth every four (4) hours as needed for Pain. Max Daily Amount: 300 mg. Indications: PAIN Quantity:  150 Tab Refills:  5 Not to exceed 5 additional fills before 03/16/2016 * Notice: This list has 1 medication(s) that are the same as other medications prescribed for you. Read the directions carefully, and ask your doctor or other care provider to review them with you. Take these medications as directed Dose & Instructions Dispensing Information Comments Morning Noon Evening Bedtime * albuterol 90 mcg/actuation inhaler Commonly known as:  VENTOLIN HFA Your next dose is: Today, Tomorrow Other:  ____________ 2 puffs qid prn Quantity:  1 Inhaler Refills:  11  
     
   
   
   
  
 calcium citrate 200 mg (950 mg) tablet Your next dose is: Today, Tomorrow Other:  ____________ TAKE 1 TAB BY MOUTH TWO (2) TIMES A DAY. Quantity:  180 Tab Refills:  1  
     
   
   
   
  
 ferrous gluconate 324 mg (38 mg iron) tablet Your next dose is: Today, Tomorrow Other:  ____________ TAKE 1 TABLET BY MOUTH ONCE DAILY Quantity:  90 Tab Refills:  0  
     
   
   
   
  
 isosorbide mononitrate ER 30 mg tablet Commonly known as:  IMDUR Your next dose is: Today, Tomorrow Other:  ____________ TAKE 1 TAB BY MOUTH EVERY MORNING. Quantity:  30 Tab Refills:  11  
     
   
   
   
  
 mometasone-formoterol 200-5 mcg/actuation HFA inhaler Commonly known as:  Deatra Elpidio Your next dose is: Today, Tomorrow Other:  ____________ 2 puffs bid, rinse mouth after use. Quantity:  1 Inhaler Refills:  11 OXYGEN-AIR DELIVERY SYSTEMS Your next dose is: Today, Tomorrow Other:  ____________ Dose:  3 L  
3 L by Nasal route. Refills:  0  
     
   
   
   
  
 simvastatin 20 mg tablet Commonly known as:  ZOCOR Your next dose is: Today, Tomorrow Other:  ____________ TAKE 1 TABLET EVERY DAY Quantity:  90 Tab Refills:  0  
     
   
   
   
  
 warfarin 5 mg tablet Commonly known as:  COUMADIN Your next dose is: Today, Tomorrow Other:  ____________ TAKE 1 TABLET BY MOUTH AT BEDTIME Quantity:  90 Tab Refills:  0  
     
   
   
   
  
 * Notice: This list has 1 medication(s) that are the same as other medications prescribed for you. Read the directions carefully, and ask your doctor or other care provider to review them with you. Where to Get Your Medications Information about where to get these medications is not yet available ! Ask your nurse or doctor about these medications  
  carvedilol 12.5 mg tablet  
 ciprofloxacin HCl 0.3 % ophthalmic solution  
 furosemide 40 mg tablet  
 lisinopril 10 mg tablet  
 spironolactone 25 mg tablet

## 2017-02-16 LAB
ANION GAP BLD CALC-SCNC: 7 MMOL/L (ref 7–16)
BUN SERPL-MCNC: 28 MG/DL (ref 8–23)
CALCIUM SERPL-MCNC: 9.2 MG/DL (ref 8.3–10.4)
CHLORIDE SERPL-SCNC: 97 MMOL/L (ref 98–107)
CHOLEST SERPL-MCNC: 113 MG/DL
CO2 SERPL-SCNC: 39 MMOL/L (ref 21–32)
CREAT SERPL-MCNC: 1.36 MG/DL (ref 0.6–1)
ERYTHROCYTE [DISTWIDTH] IN BLOOD BY AUTOMATED COUNT: 16.2 % (ref 11.9–14.6)
GLUCOSE BLD STRIP.AUTO-MCNC: 107 MG/DL (ref 65–100)
GLUCOSE BLD STRIP.AUTO-MCNC: 161 MG/DL (ref 65–100)
GLUCOSE BLD STRIP.AUTO-MCNC: 184 MG/DL (ref 65–100)
GLUCOSE SERPL-MCNC: 226 MG/DL (ref 65–100)
HCT VFR BLD AUTO: 34.9 % (ref 35.8–46.3)
HDLC SERPL-MCNC: 42 MG/DL (ref 40–60)
HDLC SERPL: 2.7 {RATIO}
HGB BLD-MCNC: 10.5 G/DL (ref 11.7–15.4)
INR PPP: 2 (ref 0.9–1.2)
LDLC SERPL CALC-MCNC: 57.2 MG/DL
LIPID PROFILE,FLP: NORMAL
MCH RBC QN AUTO: 27.9 PG (ref 26.1–32.9)
MCHC RBC AUTO-ENTMCNC: 30.1 G/DL (ref 31.4–35)
MCV RBC AUTO: 92.6 FL (ref 79.6–97.8)
PLATELET # BLD AUTO: 206 K/UL (ref 150–450)
PMV BLD AUTO: 12.2 FL (ref 10.8–14.1)
POTASSIUM SERPL-SCNC: 4.9 MMOL/L (ref 3.5–5.1)
PROTHROMBIN TIME: 21.9 SEC (ref 9.6–12)
RBC # BLD AUTO: 3.77 M/UL (ref 4.05–5.25)
SODIUM SERPL-SCNC: 143 MMOL/L (ref 136–145)
TRIGL SERPL-MCNC: 69 MG/DL (ref 35–150)
VLDLC SERPL CALC-MCNC: 13.8 MG/DL (ref 6–23)
WBC # BLD AUTO: 7.6 K/UL (ref 4.3–11.1)

## 2017-02-16 PROCEDURE — 80048 BASIC METABOLIC PNL TOTAL CA: CPT | Performed by: INTERNAL MEDICINE

## 2017-02-16 PROCEDURE — C8929 TTE W OR WO FOL WCON,DOPPLER: HCPCS

## 2017-02-16 PROCEDURE — 85610 PROTHROMBIN TIME: CPT | Performed by: INTERNAL MEDICINE

## 2017-02-16 PROCEDURE — 74011250636 HC RX REV CODE- 250/636: Performed by: INTERNAL MEDICINE

## 2017-02-16 PROCEDURE — 94640 AIRWAY INHALATION TREATMENT: CPT

## 2017-02-16 PROCEDURE — 77010033678 HC OXYGEN DAILY

## 2017-02-16 PROCEDURE — 74011250637 HC RX REV CODE- 250/637: Performed by: NURSE PRACTITIONER

## 2017-02-16 PROCEDURE — 99218 HC RM OBSERVATION: CPT

## 2017-02-16 PROCEDURE — 74011000250 HC RX REV CODE- 250: Performed by: INTERNAL MEDICINE

## 2017-02-16 PROCEDURE — 36415 COLL VENOUS BLD VENIPUNCTURE: CPT | Performed by: INTERNAL MEDICINE

## 2017-02-16 PROCEDURE — 74011000250 HC RX REV CODE- 250: Performed by: NURSE PRACTITIONER

## 2017-02-16 PROCEDURE — 80061 LIPID PANEL: CPT | Performed by: INTERNAL MEDICINE

## 2017-02-16 PROCEDURE — 85027 COMPLETE CBC AUTOMATED: CPT | Performed by: INTERNAL MEDICINE

## 2017-02-16 PROCEDURE — 94760 N-INVAS EAR/PLS OXIMETRY 1: CPT

## 2017-02-16 PROCEDURE — 74011250637 HC RX REV CODE- 250/637: Performed by: INTERNAL MEDICINE

## 2017-02-16 PROCEDURE — 82962 GLUCOSE BLOOD TEST: CPT

## 2017-02-16 RX ORDER — LISINOPRIL 20 MG/1
20 TABLET ORAL DAILY
Status: DISCONTINUED | OUTPATIENT
Start: 2017-02-16 | End: 2017-02-17

## 2017-02-16 RX ORDER — FUROSEMIDE 10 MG/ML
80 INJECTION INTRAMUSCULAR; INTRAVENOUS 2 TIMES DAILY
Status: DISCONTINUED | OUTPATIENT
Start: 2017-02-16 | End: 2017-02-17

## 2017-02-16 RX ORDER — CIPROFLOXACIN HYDROCHLORIDE 3.5 MG/ML
1 SOLUTION/ DROPS TOPICAL EVERY 4 HOURS
Status: DISCONTINUED | OUTPATIENT
Start: 2017-02-16 | End: 2017-02-18 | Stop reason: HOSPADM

## 2017-02-16 RX ADMIN — CIPROFLOXACIN HYDROCHLORIDE 1 DROP: 3 SOLUTION/ DROPS OPHTHALMIC at 21:46

## 2017-02-16 RX ADMIN — ALBUTEROL SULFATE 2.5 MG: 2.5 SOLUTION RESPIRATORY (INHALATION) at 19:53

## 2017-02-16 RX ADMIN — Medication 10 ML: at 21:46

## 2017-02-16 RX ADMIN — CARVEDILOL 6.25 MG: 6.25 TABLET, FILM COATED ORAL at 17:55

## 2017-02-16 RX ADMIN — WARFARIN SODIUM 2.5 MG: 2.5 TABLET ORAL at 00:34

## 2017-02-16 RX ADMIN — ESCITALOPRAM OXALATE 10 MG: 10 TABLET ORAL at 09:07

## 2017-02-16 RX ADMIN — BUDESONIDE 500 MCG: 0.5 INHALANT RESPIRATORY (INHALATION) at 08:37

## 2017-02-16 RX ADMIN — TIOTROPIUM BROMIDE 18 MCG: 18 CAPSULE ORAL; RESPIRATORY (INHALATION) at 08:37

## 2017-02-16 RX ADMIN — WARFARIN SODIUM 5 MG: 5 TABLET ORAL at 21:46

## 2017-02-16 RX ADMIN — ALBUTEROL SULFATE 2.5 MG: 2.5 SOLUTION RESPIRATORY (INHALATION) at 14:05

## 2017-02-16 RX ADMIN — ALBUTEROL SULFATE 2.5 MG: 2.5 SOLUTION RESPIRATORY (INHALATION) at 01:39

## 2017-02-16 RX ADMIN — CARVEDILOL 6.25 MG: 6.25 TABLET, FILM COATED ORAL at 09:07

## 2017-02-16 RX ADMIN — FERROUS GLUCONATE 1 TABLET: 324 TABLET ORAL at 09:07

## 2017-02-16 RX ADMIN — ALBUTEROL SULFATE 2.5 MG: 2.5 SOLUTION RESPIRATORY (INHALATION) at 08:37

## 2017-02-16 RX ADMIN — ASPIRIN 81 MG: 81 TABLET, COATED ORAL at 09:07

## 2017-02-16 RX ADMIN — PERFLUTREN 1 ML: 6.52 INJECTION, SUSPENSION INTRAVENOUS at 08:00

## 2017-02-16 RX ADMIN — Medication 10 ML: at 09:10

## 2017-02-16 RX ADMIN — FUROSEMIDE 60 MG: 10 INJECTION, SOLUTION INTRAMUSCULAR; INTRAVENOUS at 09:07

## 2017-02-16 RX ADMIN — Medication 2 MG: at 15:37

## 2017-02-16 RX ADMIN — CIPROFLOXACIN HYDROCHLORIDE 1 DROP: 3 SOLUTION/ DROPS OPHTHALMIC at 14:40

## 2017-02-16 RX ADMIN — LISINOPRIL 20 MG: 20 TABLET ORAL at 09:10

## 2017-02-16 RX ADMIN — FUROSEMIDE 80 MG: 10 INJECTION, SOLUTION INTRAMUSCULAR; INTRAVENOUS at 17:55

## 2017-02-16 RX ADMIN — SIMVASTATIN 20 MG: 20 TABLET, FILM COATED ORAL at 21:46

## 2017-02-16 RX ADMIN — Medication 10 ML: at 14:41

## 2017-02-16 RX ADMIN — ISOSORBIDE MONONITRATE 60 MG: 60 TABLET, EXTENDED RELEASE ORAL at 09:07

## 2017-02-16 RX ADMIN — Medication 10 ML: at 06:30

## 2017-02-16 RX ADMIN — Medication 2 MG: at 23:29

## 2017-02-16 RX ADMIN — BUDESONIDE 500 MCG: 0.5 INHALANT RESPIRATORY (INHALATION) at 19:53

## 2017-02-16 NOTE — ED NOTES
Two attempts made to run POC INR with erroneous results. Order changed to Lab INR and blue top sent to lab.

## 2017-02-16 NOTE — H&P
[facility]   HISTORY AND PHYSICAL       Name:  Terry Yarbrough   MR#:  [id number]   :  1950   Account #:  [de-identified]   Date of Adm:  02/15/2017       REASON FOR ADMISSION: Dyspnea. HISTORY OF PRESENT ILLNESS: This is a 58-year-old female with a   past medical history of coronary artery disease with known prior   distal LAD stent in , severe left ventricular systolic   dysfunction with last noted ejection fraction of around 15-20%,   presented to the emergency room with complaints of increased   dyspnea which was noted over the last few days. The patient at   baseline does have COPD and is oxygen dependent with 4 L at   baseline; however, she has had issues with increasing dyspnea on   exertion with what she normally does with her minimal activities   of daily living. Also has been having more issues with some   orthopnea, states that over the last 4 days at night she has   been noticing tightness across her chest when she does go to   sleep, She has tried some sublingual nitroglycerin which does   improve her symptoms after a bit and it usually takes about 15   minutes at night. Denies any chest discomfort with exertion. Also, denies any significant weight gain, but does not weigh   herself on a daily basis. Otherwise, denies any recent fevers or   chills. As stated above, she is mostly impeded with chronic   dyspnea due to her underlying COPD and does only some minimal   activities of daily living around the house. No other complaints at   this time. PAST MEDICAL HISTORY   1. Coronary artery disease with noted stent to the distal LAD in   ; this was initially noted when the patient presented with   ventricular fibrillation and underwent a coronary angiogram   which showed some distal LAD lesion that was subsequently   stented; the rest of her anatomy did not show any significant   disease.    2. Severe left ventricular systolic dysfunction status post   dual-chamber ICD in 2014. 3. History of mechanical aortic valve replacement in 2005. Of   note, the patient did have a bioprosthetic aortic valve   replacement in 1997.   4. Myelodysplastic syndrome with anemia. 5. Obstructive sleep apnea, on CPAP. 6. Chronic obstructive pulmonary disease, oxygen dependent with   4 L.   7. Prior history of diabetes mellitus, but the patient states   she has been taken off her diabetic medications about 2 years   ago. 8. Hypertension. 9. Hyperlipidemia. 10. Chronic kidney disease with baseline creatinine of about 1.5   to 1.7. HOME MEDICATIONS   1. Imdur 30 mg daily. 2. Coreg 6.25 mg b.i.d.   3. Lexapro 10 mg daily. 4. Lasix 40 mg daily. 5. Lisinopril 2.5 mg daily. 6. Coumadin. 7. Iron 324 mg daily. 8. Calcium citrate 1 tablet twice a day. 9. Zocor 20 mg at bedtime. 10. Tramadol 50 mg as needed for pain. 11. Dulera 200/5 mcg 2 puffs b.i.d.   12. Vitamin D3 daily by mouth. 13. Spiriva 18 mcg inhalation daily. 14. Flonase in both nostrils as needed. 15. Albuterol nebulizers every 4 hours as needed. 16. Ventolin 2 puffs 4 times a day as needed. 17. Tylenol as needed. 18. Aspirin 81 mg daily. 19. Nitroglycerin 0.4 mg sublingual as needed. ALLERGIES: LISTED TO SULFA. FAMILY HISTORY: No history for any premature coronary artery   disease. SOCIAL HISTORY: The patient does have a prior smoking history of   1/4 pack per day, but quit smoking in 2014. Denies any alcohol   or drug use. REVIEW OF SYSTEMS: Negative for any chills. The patient does   state feeling febrile at times, but has not checked   temperatures. Denies any visual changes or any hearing   abnormalities. Does complain of some dizziness/vertigo. Also   denies any bowel or bladder issues. No other neurological   symptoms. Rest per HPI. All other systems reviewed and are   negative.     PHYSICAL EXAMINATION   VITAL SIGNS: Temperature 98.7, blood pressure 168/77, heart rate   61, saturating about 96% on 4 liters. GENERAL: Alert and oriented and some mild respiratory   difficulty. HEENT: Atraumatic, normocephalic. Mucous membranes moist.   Oropharynx clear. NECK: Supple. JVD noted to the angle of the jaw. CARDIOVASCULAR: S1, S2 click heard. Low-grade systolic ejection   murmur. No rubs or gallops heard. LUNGS: Bibasilar rales. ABDOMEN: Soft, nondistended. Bowel sounds present. EXTREMITIES: Trace to 1+ edema bilaterally. NEUROLOGIC: No focal cranial nerve deficits. VASCULAR: Pulses 2+ and symmetrical in her radial arteries. No   carotid bruits heard. DIAGNOSTIC DATA: EKG reviewed, which shows a sinus rhythm with   some PVCs; heart rate of about 62; there is some nonspecific   intraventricular conduction defect, possibly an atypical left   bundle branch block, not significantly changed from prior; there   is poor R-wave progression and left axis deviation. Troponin   first set is 0.04. WBC 6.3, hemoglobin 10.6, hematocrit 35.0,   platelets 959. Sodium 144, potassium 5.0, chloride 100, CO2 of   39, BUN 33, creatinine 1.5, calcium 8.9, total protein 6.9,   albumin 3.5. ALT 54, AST 55. At baseline about 2 weeks ago, ALT   and AST were 22 and 16. IMPRESSION   1. Dyspnea secondary to heart failure exacerbation. 2. Severe left ventricular dysfunction with history of   implantable cardioverter defibrillator placement, baseline [place] [employer] functional class III, stage C.   3. History of a mechanical aortic valve replacement. 4. Coronary artery disease with noted stent to the distal left   anterior descending in 2014.   5. Obstructive sleep apnea, on CPAP. 6. Chronic obstructive pulmonary disease, on 4 liters chronic   oxygen. 7. Hypertension. 8. Hyperlipidemia. 9. Chronic kidney disease with baseline creatinine of 1.5   to 1.7. RECOMMENDATIONS: The patient's symptoms as well as her clinical   exam is consistent with volume overload.  She did receive 40 mg   of IV Lasix in the ER with about 500 mL output so far. Will try   increasing her IV diuretics for now and assess response. Blood   pressures have been noted elevated. Will titrate her   medications, as this could also be contributing to her clinical   presentation. Some of her chest tightness is mostly noted at   night when she goes to sleep and is possibly secondary to her   decompensated heart failure. Reassess her symptoms once she is more   euvolemic and if she has persistent symptoms, consideration for   ischemic evaluation with a nuclear stress test. Otherwise, will   plan on following up a transthoracic echocardiogram in the   morning. Continue the rest of her regimen at this time and make   further recommendations pending her clinical course.         MD MARIAMA Gonsales / MACIEJ   D:  02/15/2017   20:14   T:  02/15/2017   21:32   Job #:  [id number]

## 2017-02-16 NOTE — PROGRESS NOTES
Problem: Breathing Pattern - Ineffective  Goal: *Absence of hypoxia  Outcome: Progressing Towards Goal  Pt on 4 L NC, SAT 94%.  BBS diminished, pt SOB at rest.

## 2017-02-16 NOTE — PROGRESS NOTES
[facility] CARDIOLOGY PROGRESS NOTE           2/16/2017 9:01 AM    Admit Date: 2/15/2017      Subjective:   Very short of breath with minimal activity. Noted BNP on admission--bp elevated this am.     ROS:  Cardiovascular:  As noted above    Objective:      Vitals:    02/16/17 0556 02/16/17 0812 02/16/17 0837 02/16/17 0842   BP: 146/77 (!) 180/94     Pulse: 68 73     Resp: 20 20     Temp: 97.5 °F (36.4 °C) 97.9 °F (36.6 °C)     SpO2: 93% 91% 95% 94%   Weight: 104.7 kg (230 lb 14.4 oz)      Height:           Physical Exam:  General-No Acute Distress  Neck- supple, no JVD  CV- regular rate and rhythm no MRG  Lung- diminished BS throughout, bibasilar crackles  Abd- soft, nontender, nondistended  Ext-1+ edema bilaterally. Skin- warm and dry    Data Review:   Recent Labs      02/16/17   0515  02/15/17   2104  02/15/17   1309   NA   --    --   144   K   --    --   5.0   BUN   --    --   33*   CREA   --    --   1.51*   GLU   --    --   200*   WBC   --    --   6.2   HGB   --    --   10.6*   HCT   --    --   35.0*   PLT   --    --   177   INR   --   1.9*   --    CHOL  113   --    --    TGL  69   --    --    LDLC  57.2   --    --    HDL  42   --    --        Assessment/Plan:     Principal Problem:    Chronic combined systolic and diastolic heart failure (HCC) (1/20/2013)--shortness of breath multifactorial--including her poor EF-mild volume overload, chronic lung disease, pHTN and body habitus. Continue IV diuresis, breathing treatment, hypoxia noted off trilogy. BP elevated--titrated lisinopril up. No infiltrates noted on xray. Overview: 5/8/14 ECHO:  EF 10-15%    Active Problems:    MDS (myelodysplastic syndrome) (HCC) (12/17/2011)--this am labs pending, monitor closely. On coumadin      Overview: Procrit started in August, 2011 12/18/11 Procrit weekly and Iron stores.       5-12 12-13-12 good response to 3 weekly procrit did not need it last time            3-7-13 Pt doing well. Just wanted a \"check-up. \" Responding to Procrit       every three weeks.       8-29-13 patient has missed some injections on recently restarted       hemoglobin only issue , takes oral iron iron stores each time             CAD (coronary artery disease) (1/20/2013)--negative troponin      Overview: 5/8/14 PCI LAD with stent placed      Essential hypertension, benign (1/20/2013)      Anticoagulated on Coumadin (7/9/2013)--INR low yesterday, todays pending      Overview: S/P AVR      CKD (chronic kidney disease) stage 3, GFR 30-59 ml/min (7/10/2013)--monitor closely with diuresis      COPD (chronic obstructive pulmonary disease) ([address] (4/2/2014)      Chronic respiratory failure ([address] (4/2/2014)      REJI (obstructive sleep apnea)-cpap (4/2/2014)      Diabetes mellitus type 2, diet-controlled ([address] (8/28/2014)      ICD (implantable cardioverter-defibrillator) in place (10/2/2014)      Overview: Biotronik single-chamber ICD implantation 10/20/14      HLD (hyperlipidemia) ()--statin       Cardiomyopathy ([address] ()      Pulmonary hypertension ([address] (6/15/2016)          Sherell Holter, NP  2/16/2017 9:01 AM

## 2017-02-16 NOTE — PROGRESS NOTES
Bedside report from [place], [address] [place] [address]. Pt with dyspnea at rest. Abdminal and labored breathing. SpO2 >90% currently on Triology with 4L bled in. Monitoring.

## 2017-02-16 NOTE — PROGRESS NOTES
Bedside and Verbal shift change report given to Lieutenant Fernandez RN (oncoming nurse) by Jefferson Arguello RN (offgoing nurse). Report included the following information SBAR, Kardex, Accordion and Recent Results.

## 2017-02-16 NOTE — PROGRESS NOTES
Patient signed observation letter and expressed understanding. Also spoke with patient's daughter who states that she will follow-up with [address] [place] [address] and PCP. Her former PCP notified her that they could no longer see her because her insurance coverage was no longer valid. [address] [place] [address] no longer covers [facility] PCP practices. Her daughter states that she will contact social work for any needs.

## 2017-02-16 NOTE — ED NOTES
TRANSFER - OUT REPORT:    Verbal report given to Thelma(name) on [facility]  being transferred to [facility](unit) for routine progression of care       Report consisted of patients Situation, Background, Assessment and   Recommendations(SBAR). Information from the following report(s) ED Summary was reviewed with the receiving nurse. Lines:   Peripheral IV 02/15/17 Left Antecubital (Active)   Site Assessment Clean, dry, & intact 2/15/2017  1:14 PM   Phlebitis Assessment 0 2/15/2017  1:14 PM   Infiltration Assessment 0 2/15/2017  1:14 PM   Dressing Status Clean, dry, & intact 2/15/2017  1:14 PM   Hub Color/Line Status Pink 2/15/2017  1:14 PM        Opportunity for questions and clarification was provided.       Patient transported with:   O2 @ 3 liters

## 2017-02-16 NOTE — PROGRESS NOTES
TRANSFER - IN REPORT:    Verbal report received from Uriel Gan RN on [employer] being received from ER for routine progression of care      Report consisted of patients Situation, Background, Assessment and Recommendations(SBAR). Information from the following report(s) SBAR, Kardex, ED Summary, Accordion and Recent Results was reviewed with the receiving nurse. Opportunity for questions and clarification was provided. Assessment completed upon patients arrival to unit and care    Monitor placed on patient. Instructed to call for assistance. Verbal understanding. Admission skin assessment completed with second RN and reveals the following: Sacrum observed, WNL. Scars from previous surgery. BLE cracked skin. Stated fractured right arm. Healed abrasion on left leg. Skin intact with no breakdown.

## 2017-02-16 NOTE — PROGRESS NOTES
Problem: Nutrition Deficit  Goal: *Optimize nutritional status  Nutrition  Reason for assessment: Referral received from nursing admission Malnutrition Screening Tool for recently lost 2-13# without trying and eating poorly due to decreased appetite. Assessment:   Diet order(s): cardiac  Food/Nutrition Patient History:  Patient with extensive PMH for MDS, COPD, CHF, DM, and CKD. Patient reports that she has had a good appetite at home, however, has lost a couple of pounds due to controlled portions from [facility] meals. She reports eating no more than 4 oz of protein, one starch, and a vegetable at each meal.  She is not adding salt to foods. Patient reports occasionally snacking on cheese yeni. Patient reports eating an OK breakfast this morning. Labs are remarkable for A1C 6.7 (12/16/16), glucose 200, Cr 1.51, K 5.0.  RD does not believe patient requires a renal diet restriction at this time. Anthropometrics:Height: 5' 2\" (157.5 cm),  Weight: 104.7 kg (230 lb 14.4 oz), Weight Source: Standing scale (comment), Body mass index is 42.23 kg/(m^2). BMI class of morbid obesity class III. WT / BMI 2/16/2017 2/1/2017 12/29/2016 12/16/2016   WEIGHT 230 lb 14.4 oz 232 lb 7 oz 236 lb 234 lb       WT / BMI 12/7/2016 11/30/2016 10/28/2016   WEIGHT 232 lb 5 oz 234 lb 9.6 oz 229 lb 6.4 oz   Patient has had a 6 pound weight loss in the past 1.5 months and a potential 2 pound weight loss in the past ~2 weeks. Weight fluctuations are expected. Macronutrient needs:  EER:  3720-9911 kcal /day (11-14 kcal/kg listed BW)  EPR:  40-50 grams protein/day (0.8-1 grams/kg IBW)(GFR 44)-h/o CKD  Intake/Comparative Standards: No recorded meal intake. Nutrition Diagnosis: No nutrition diagnosis at this time. Intervention:  Meals and snacks: Add CCHO diet restriction to current cardiac diet  RD provided patient with a menu.      Preethi Angeles [address], [address] [place] [address], [address] [place] [address], [phone]

## 2017-02-17 ENCOUNTER — HOME HEALTH ADMISSION (OUTPATIENT)
Dept: HOME HEALTH SERVICES | Facility: HOME HEALTH | Age: 67
End: 2017-02-17
Payer: MEDICARE

## 2017-02-17 PROBLEM — I50.9 CHF (CONGESTIVE HEART FAILURE) (HCC): Status: ACTIVE | Noted: 2017-02-17

## 2017-02-17 LAB
INR PPP: 2.2 (ref 0.9–1.2)
PROTHROMBIN TIME: 24.3 SEC (ref 9.6–12)

## 2017-02-17 PROCEDURE — 74011250636 HC RX REV CODE- 250/636: Performed by: INTERNAL MEDICINE

## 2017-02-17 PROCEDURE — 85610 PROTHROMBIN TIME: CPT | Performed by: INTERNAL MEDICINE

## 2017-02-17 PROCEDURE — 94760 N-INVAS EAR/PLS OXIMETRY 1: CPT

## 2017-02-17 PROCEDURE — 74011000250 HC RX REV CODE- 250: Performed by: INTERNAL MEDICINE

## 2017-02-17 PROCEDURE — 74011250637 HC RX REV CODE- 250/637: Performed by: INTERNAL MEDICINE

## 2017-02-17 PROCEDURE — 77010033678 HC OXYGEN DAILY

## 2017-02-17 PROCEDURE — 36415 COLL VENOUS BLD VENIPUNCTURE: CPT | Performed by: INTERNAL MEDICINE

## 2017-02-17 PROCEDURE — 65660000000 HC RM CCU STEPDOWN

## 2017-02-17 PROCEDURE — 94640 AIRWAY INHALATION TREATMENT: CPT

## 2017-02-17 PROCEDURE — 74011250637 HC RX REV CODE- 250/637: Performed by: NURSE PRACTITIONER

## 2017-02-17 PROCEDURE — 99218 HC RM OBSERVATION: CPT

## 2017-02-17 RX ORDER — FUROSEMIDE 40 MG/1
80 TABLET ORAL 2 TIMES DAILY
Status: DISCONTINUED | OUTPATIENT
Start: 2017-02-17 | End: 2017-02-18 | Stop reason: HOSPADM

## 2017-02-17 RX ORDER — LISINOPRIL 20 MG/1
20 TABLET ORAL 2 TIMES DAILY
Status: DISCONTINUED | OUTPATIENT
Start: 2017-02-17 | End: 2017-02-18

## 2017-02-17 RX ORDER — SPIRONOLACTONE 25 MG/1
25 TABLET ORAL DAILY
Status: DISCONTINUED | OUTPATIENT
Start: 2017-02-18 | End: 2017-02-18 | Stop reason: HOSPADM

## 2017-02-17 RX ADMIN — ESCITALOPRAM OXALATE 10 MG: 10 TABLET ORAL at 08:49

## 2017-02-17 RX ADMIN — FUROSEMIDE 80 MG: 10 INJECTION, SOLUTION INTRAMUSCULAR; INTRAVENOUS at 08:49

## 2017-02-17 RX ADMIN — Medication 2 MG: at 23:52

## 2017-02-17 RX ADMIN — Medication 10 ML: at 06:25

## 2017-02-17 RX ADMIN — LISINOPRIL 20 MG: 20 TABLET ORAL at 08:48

## 2017-02-17 RX ADMIN — LISINOPRIL 20 MG: 20 TABLET ORAL at 17:04

## 2017-02-17 RX ADMIN — CARVEDILOL 6.25 MG: 6.25 TABLET, FILM COATED ORAL at 08:48

## 2017-02-17 RX ADMIN — CIPROFLOXACIN HYDROCHLORIDE 1 DROP: 3 SOLUTION/ DROPS OPHTHALMIC at 20:41

## 2017-02-17 RX ADMIN — BUDESONIDE 500 MCG: 0.5 INHALANT RESPIRATORY (INHALATION) at 08:01

## 2017-02-17 RX ADMIN — Medication 10 ML: at 22:00

## 2017-02-17 RX ADMIN — SIMVASTATIN 20 MG: 20 TABLET, FILM COATED ORAL at 22:17

## 2017-02-17 RX ADMIN — FERROUS GLUCONATE 1 TABLET: 324 TABLET ORAL at 08:48

## 2017-02-17 RX ADMIN — ALBUTEROL SULFATE 2.5 MG: 2.5 SOLUTION RESPIRATORY (INHALATION) at 22:40

## 2017-02-17 RX ADMIN — CIPROFLOXACIN HYDROCHLORIDE 1 DROP: 3 SOLUTION/ DROPS OPHTHALMIC at 08:50

## 2017-02-17 RX ADMIN — Medication 10 ML: at 11:55

## 2017-02-17 RX ADMIN — TIOTROPIUM BROMIDE 18 MCG: 18 CAPSULE ORAL; RESPIRATORY (INHALATION) at 08:05

## 2017-02-17 RX ADMIN — CARVEDILOL 6.25 MG: 6.25 TABLET, FILM COATED ORAL at 17:04

## 2017-02-17 RX ADMIN — CIPROFLOXACIN HYDROCHLORIDE 1 DROP: 3 SOLUTION/ DROPS OPHTHALMIC at 23:52

## 2017-02-17 RX ADMIN — CIPROFLOXACIN HYDROCHLORIDE 1 DROP: 3 SOLUTION/ DROPS OPHTHALMIC at 00:00

## 2017-02-17 RX ADMIN — ALBUTEROL SULFATE 2.5 MG: 2.5 SOLUTION RESPIRATORY (INHALATION) at 03:00

## 2017-02-17 RX ADMIN — ALBUTEROL SULFATE 2.5 MG: 2.5 SOLUTION RESPIRATORY (INHALATION) at 08:01

## 2017-02-17 RX ADMIN — CIPROFLOXACIN HYDROCHLORIDE 1 DROP: 3 SOLUTION/ DROPS OPHTHALMIC at 06:25

## 2017-02-17 RX ADMIN — ASPIRIN 81 MG: 81 TABLET, COATED ORAL at 08:48

## 2017-02-17 RX ADMIN — CIPROFLOXACIN HYDROCHLORIDE 1 DROP: 3 SOLUTION/ DROPS OPHTHALMIC at 17:05

## 2017-02-17 RX ADMIN — WARFARIN SODIUM 5 MG: 5 TABLET ORAL at 22:17

## 2017-02-17 RX ADMIN — FUROSEMIDE 80 MG: 40 TABLET ORAL at 17:04

## 2017-02-17 RX ADMIN — CIPROFLOXACIN HYDROCHLORIDE 1 DROP: 3 SOLUTION/ DROPS OPHTHALMIC at 11:55

## 2017-02-17 RX ADMIN — ISOSORBIDE MONONITRATE 60 MG: 60 TABLET, EXTENDED RELEASE ORAL at 08:48

## 2017-02-17 RX ADMIN — BUDESONIDE 500 MCG: 0.5 INHALANT RESPIRATORY (INHALATION) at 22:39

## 2017-02-17 RX ADMIN — ALBUTEROL SULFATE 2.5 MG: 2.5 SOLUTION RESPIRATORY (INHALATION) at 13:48

## 2017-02-17 NOTE — PROGRESS NOTES
Bedside and Verbal shift change report given to Primo Fink RN (oncoming nurse) by Марина Crowell RN (offgoing nurse). Report included the following information SBAR, Kardex, Accordion and Recent Results.

## 2017-02-17 NOTE — PROGRESS NOTES
[facility] CARDIOLOGY PROGRESS NOTE           2/17/2017 11:11 AM    Admit Date: 2/15/2017      Subjective:   Less dyspnea. ROS:  Cardiovascular:  As noted above    Objective:      Vitals:    02/17/17 0300 02/17/17 0535 02/17/17 0807 02/17/17 0834   BP:  147/68  151/69   Pulse:  80  70   Resp:  19  18   Temp:  97.9 °F (36.6 °C)  97 °F (36.1 °C)   SpO2: 90% 97% 92% 92%   Weight:  104.3 kg (229 lb 14.4 oz)     Height:           Physical Exam:  General-No Acute Distress, on CPAP  Neck- supple, no JVD  CV- regular rate and rhythm no MRG  Lung- clear bilaterally  Abd- soft, nontender, nondistended  Ext- no edema bilaterally. Skin- warm and dry    Data Review:   Recent Labs      02/17/17   0440  02/16/17   1146  02/16/17   1024  02/16/17   0919  02/16/17   0515   02/15/17   1309   NA   --    --   143   --    --    --   144   K   --    --   4.9   --    --    --   5.0   BUN   --    --   28*   --    --    --   33*   CREA   --    --   1.36*   --    --    --   1.51*   GLU   --    --   226*   --    --    --   200*   WBC   --    --    --   7.6   --    --   6.2   HGB   --    --    --   10.5*   --    --   10.6*   HCT   --    --    --   34.9*   --    --   35.0*   PLT   --    --    --   206   --    --   177   INR  2.2*  2.0*   --    --    --    < >   --    CHOL   --    --    --    --   113   --    --    TGL   --    --    --    --   69   --    --    LDLC   --    --    --    --   57.2   --    --    HDL   --    --    --    --   42   --    --     < > = values in this interval not displayed.        Assessment/Plan:     Cardiomyopathy w/ severe lv dysfxn  S/p m AVR 2009  S/p arrest  And pci to mid lad 5/14  S/p prophylactic icd 10/14  REJI  CKD  Dm  MDS  ///  Change lasix to po, nc acei, add mra and a K+ binder if necessary    Estefanía Amanda MD  2/17/2017 11:11 AM

## 2017-02-17 NOTE — PROGRESS NOTES
[address]  Face to Face Encounter    Patients Name: Vinicio Hardy    YOB: 1950    Ordering Physician: Dr. Marino Yeh  Primary Diagnosis: CHF    Date of Face to Face:   2/17/2017                                  Face to Face Encounter findings are related to primary reason for home care:   yes. 1. I certify that the patient needs intermittent care as follows: skilled nursing care:  CHF/COPD - meds and disease education; PT/OT - eval and treat    2. I certify that this patient is homebound, that is: 1) patient requires the use of a cane device, special transportation, or assistance of another to leave the home; or 2) patient's condition makes leaving the home medically contraindicated; and 3) patient has a normal inability to leave the home and leaving the home requires considerable and taxing effort. Patient may leave the home for infrequent and short duration for medical reasons, and occasional absences for non-medical reasons. Homebound status is due to the following functional limitations: Patient with increased shortness of breath with all exertional activity limiting ambulation outside the home. Patient with strength deficits limiting the ability to carry portable O2 for distances outside the home without the assistance of a caregiver. 3. I certify that this patient is under my care and that I, or a nurse practitioner or  [id number], or clinical nurse specialist, or certified nurse midwife, working with me, had a Face-to-Face Encounter that meets the physician Face-to-Face Encounter requirements.   The following are the clinical findings from the [address] [place] [address] encounter that support the need for skilled services and is a summary of the encounter:  See hospital chart      Debra Morgan, [address]  2/17/2017      THE FOLLOWING TO BE COMPLETED BY THE COMMUNITY PHYSICIAN:    I concur with the findings described above from the [facility] encounter that this patient is homebound and in need of a skilled service.     Certifying Physician: _____________________________________      Printed Certifying Physician Name: _____________________________________    Date: _________________

## 2017-02-17 NOTE — PROGRESS NOTES
HEIDI dc screening. Adm with increasing dyspnea. Hx of COPD. Resides alone in an apt. Supportive daus in the community. Has signif visual impairment but is able to ambulate independently in the home environment. Has a [facility] aide who comes in 5 days/week, 3 hours/day. Aide assists with errands, bathing dressing, errands, meds mgmt. Pt has a meal delivery program as well. Home DME: [employer] hs, home oxygen 24/7 - [employer]; [place], [place], nebulizer. Will dc with [id number] [place] [address], PT through [facility]. Referral made.

## 2017-02-17 NOTE — PROGRESS NOTES
Placed pt on home Trillogy machine with 4L O2 bleed-in.     02/16/17 5816   Oxygen Therapy   O2 Sat (%) 95 %   Pulse via Oximetry 77 beats per minute   O2 Device Other (comment)  (Home Trillogy machine)   O2 Flow Rate (L/min) 4 l/min

## 2017-02-18 VITALS
OXYGEN SATURATION: 94 % | TEMPERATURE: 97.9 F | RESPIRATION RATE: 18 BRPM | BODY MASS INDEX: 41.18 KG/M2 | WEIGHT: 223.8 LBS | HEIGHT: 62 IN | DIASTOLIC BLOOD PRESSURE: 78 MMHG | HEART RATE: 69 BPM | SYSTOLIC BLOOD PRESSURE: 136 MMHG

## 2017-02-18 LAB
ANION GAP BLD CALC-SCNC: 9 MMOL/L (ref 7–16)
BUN SERPL-MCNC: 29 MG/DL (ref 8–23)
CALCIUM SERPL-MCNC: 9.3 MG/DL (ref 8.3–10.4)
CHLORIDE SERPL-SCNC: 94 MMOL/L (ref 98–107)
CO2 SERPL-SCNC: 40 MMOL/L (ref 21–32)
CREAT SERPL-MCNC: 1.19 MG/DL (ref 0.6–1)
GLUCOSE SERPL-MCNC: 99 MG/DL (ref 65–100)
INR PPP: 2 (ref 0.9–1.2)
MAGNESIUM SERPL-MCNC: 2.1 MG/DL (ref 1.8–2.4)
POTASSIUM SERPL-SCNC: 4.2 MMOL/L (ref 3.5–5.1)
PROTHROMBIN TIME: 22.3 SEC (ref 9.6–12)
SODIUM SERPL-SCNC: 143 MMOL/L (ref 136–145)

## 2017-02-18 PROCEDURE — 74011250637 HC RX REV CODE- 250/637: Performed by: INTERNAL MEDICINE

## 2017-02-18 PROCEDURE — 94760 N-INVAS EAR/PLS OXIMETRY 1: CPT

## 2017-02-18 PROCEDURE — 94640 AIRWAY INHALATION TREATMENT: CPT

## 2017-02-18 PROCEDURE — 80048 BASIC METABOLIC PNL TOTAL CA: CPT | Performed by: INTERNAL MEDICINE

## 2017-02-18 PROCEDURE — 74011000250 HC RX REV CODE- 250: Performed by: INTERNAL MEDICINE

## 2017-02-18 PROCEDURE — 85610 PROTHROMBIN TIME: CPT | Performed by: INTERNAL MEDICINE

## 2017-02-18 PROCEDURE — 77010033678 HC OXYGEN DAILY

## 2017-02-18 PROCEDURE — 36415 COLL VENOUS BLD VENIPUNCTURE: CPT | Performed by: INTERNAL MEDICINE

## 2017-02-18 PROCEDURE — 74011000250 HC RX REV CODE- 250: Performed by: NURSE PRACTITIONER

## 2017-02-18 PROCEDURE — 83735 ASSAY OF MAGNESIUM: CPT | Performed by: INTERNAL MEDICINE

## 2017-02-18 RX ORDER — SPIRONOLACTONE 25 MG/1
25 TABLET ORAL DAILY
Qty: 30 TAB | Refills: 6 | Status: SHIPPED | OUTPATIENT
Start: 2017-02-18 | End: 2017-09-21 | Stop reason: SDUPTHER

## 2017-02-18 RX ORDER — LISINOPRIL 10 MG/1
10 TABLET ORAL 2 TIMES DAILY
Qty: 60 TAB | Refills: 6 | Status: SHIPPED | OUTPATIENT
Start: 2017-02-18 | End: 2017-03-09 | Stop reason: ALTCHOICE

## 2017-02-18 RX ORDER — CIPROFLOXACIN HYDROCHLORIDE 3.5 MG/ML
1 SOLUTION/ DROPS TOPICAL EVERY 4 HOURS
Qty: 30 ML | Refills: 0 | Status: SHIPPED
Start: 2017-02-18 | End: 2017-03-09

## 2017-02-18 RX ORDER — CARVEDILOL 12.5 MG/1
12.5 TABLET ORAL 2 TIMES DAILY WITH MEALS
Status: DISCONTINUED | OUTPATIENT
Start: 2017-02-18 | End: 2017-02-18 | Stop reason: HOSPADM

## 2017-02-18 RX ORDER — FUROSEMIDE 40 MG/1
80 TABLET ORAL 2 TIMES DAILY
Qty: 120 TAB | Refills: 6 | Status: SHIPPED | OUTPATIENT
Start: 2017-02-18 | End: 2018-04-05 | Stop reason: SDUPTHER

## 2017-02-18 RX ORDER — CARVEDILOL 12.5 MG/1
12.5 TABLET ORAL 2 TIMES DAILY WITH MEALS
Qty: 60 TAB | Refills: 6 | Status: SHIPPED | OUTPATIENT
Start: 2017-02-18 | End: 2017-12-18

## 2017-02-18 RX ORDER — LISINOPRIL 5 MG/1
10 TABLET ORAL 2 TIMES DAILY
Status: DISCONTINUED | OUTPATIENT
Start: 2017-02-18 | End: 2017-02-18 | Stop reason: HOSPADM

## 2017-02-18 RX ADMIN — BUDESONIDE 500 MCG: 0.5 INHALANT RESPIRATORY (INHALATION) at 08:00

## 2017-02-18 RX ADMIN — ALBUTEROL SULFATE 2.5 MG: 2.5 SOLUTION RESPIRATORY (INHALATION) at 08:00

## 2017-02-18 RX ADMIN — Medication 5 ML: at 06:11

## 2017-02-18 RX ADMIN — TIOTROPIUM BROMIDE 18 MCG: 18 CAPSULE ORAL; RESPIRATORY (INHALATION) at 08:00

## 2017-02-18 RX ADMIN — SPIRONOLACTONE 25 MG: 25 TABLET, FILM COATED ORAL at 09:15

## 2017-02-18 RX ADMIN — FERROUS GLUCONATE 1 TABLET: 324 TABLET ORAL at 09:15

## 2017-02-18 RX ADMIN — FUROSEMIDE 80 MG: 40 TABLET ORAL at 09:15

## 2017-02-18 RX ADMIN — ASPIRIN 81 MG: 81 TABLET, COATED ORAL at 09:15

## 2017-02-18 RX ADMIN — CIPROFLOXACIN HYDROCHLORIDE 1 DROP: 3 SOLUTION/ DROPS OPHTHALMIC at 03:54

## 2017-02-18 RX ADMIN — CIPROFLOXACIN HYDROCHLORIDE 1 DROP: 3 SOLUTION/ DROPS OPHTHALMIC at 09:35

## 2017-02-18 RX ADMIN — ALBUTEROL SULFATE 2.5 MG: 2.5 SOLUTION RESPIRATORY (INHALATION) at 02:01

## 2017-02-18 RX ADMIN — ESCITALOPRAM OXALATE 10 MG: 10 TABLET ORAL at 09:15

## 2017-02-18 RX ADMIN — LISINOPRIL 10 MG: 5 TABLET ORAL at 09:15

## 2017-02-18 NOTE — PROGRESS NOTES
Bedside and Verbal shift change report given to Agnes Castro RN (oncoming nurse) by self Fabi Portillo nurse). Report included the following information SBAR, Kardex, MAR and Recent Results.

## 2017-02-18 NOTE — PROGRESS NOTES
Bedside and Verbal shift change report given to self (oncoming nurse) by Lizett Myles RN (offgoing nurse). Report included the following information SBAR, Kardex, MAR, Recent Results and Cardiac Rhythm NSR.

## 2017-02-18 NOTE — PROGRESS NOTES
[facility] CARDIOLOGY PROGRESS NOTE           2/18/2017 7:42 AM    Admit Date: 2/15/2017      Subjective:   Doing well. Transitioned to PO lasix yesterday. Tolerated well. Am labs pending--hopefully home today. States that breathing is much improved. ROS:  Cardiovascular:  As noted above    Objective:      Vitals:    02/18/17 0119 02/18/17 0202 02/18/17 0449 02/18/17 0627   BP: 151/82   126/56   Pulse: 72   78   Resp: 16   16   Temp: 97.7 °F (36.5 °C)   98.8 °F (37.1 °C)   SpO2: 97% 91%     Weight:   101.5 kg (223 lb 12.8 oz)    Height:           Physical Exam:  General-No Acute Distress  Neck- supple, no JVD  CV- regular rate and rhythm no MRG  Lung- clear bilaterally  Abd- soft, nontender, nondistended  Ext- no edema bilaterally. Skin- warm and dry    Data Review:   Recent Labs      02/18/17   0350  02/17/17   0440   02/16/17   1024  02/16/17   0919  02/16/17   0515   02/15/17   1309   NA   --    --    --   143   --    --    --   144   K   --    --    --   4.9   --    --    --   5.0   BUN   --    --    --   28*   --    --    --   33*   CREA   --    --    --   1.36*   --    --    --   1.51*   GLU   --    --    --   226*   --    --    --   200*   WBC   --    --    --    --   7.6   --    --   6.2   HGB   --    --    --    --   10.5*   --    --   10.6*   HCT   --    --    --    --   34.9*   --    --   35.0*   PLT   --    --    --    --   206   --    --   177   INR  2.0*  2.2*   < >   --    --    --    < >   --    CHOL   --    --    --    --    --   113   --    --    TGL   --    --    --    --    --   69   --    --    LDLC   --    --    --    --    --   57.2   --    --    HDL   --    --    --    --    --   42   --    --     < > = values in this interval not displayed. Assessment/Plan:     Principal Problem:    Chronic combined systolic and diastolic heart failure ([facility] (1/20/2013)--meds appropriate. Labs pending. ICD in place--single chamber --Narrow qrs.  Home on higher dose diuretics Overview: 5/8/14 ECHO:  EF 10-15%    Active Problems:    MDS (myelodysplastic syndrome) (HCC) (12/17/2011)--stable counts       Overview: Procrit started in August, 2011 12/18/11 Procrit weekly and Iron stores. 5-12 12-13-12 good response to 3 weekly procrit did not need it last time            3-7-13 Pt doing well. Just wanted a \"check-up. \" Responding to Procrit       every three weeks. 8-29-13 patient has missed some injections on recently restarted       hemoglobin only issue , takes oral iron iron stores each time             CAD (coronary artery disease) (1/20/2013)--no cp continue med tx       Overview: 5/8/14 PCI LAD with stent placed      Essential hypertension, benign (1/20/2013)--stable on current meds, consider titrating coreg up. Anticoagulated on Coumadin (7/9/2013)--inr therapeutic       Overview: S/P AVR      CKD (chronic kidney disease) stage 3, GFR 30-59 ml/min (7/10/2013)--monitoring closely, follow up labs one week      COPD (chronic obstructive pulmonary disease) ([address] (4/2/2014)      Chronic respiratory failure ([address] (4/2/2014)      REJI (obstructive sleep apnea)-cpap (4/2/2014)      Diabetes mellitus type 2, diet-controlled ([address] (8/28/2014)--stable on current meds.        ICD (implantable cardioverter-defibrillator) in place (10/2/2014)      Overview: [employer] single-chamber ICD implantation 10/20/14      HLD (hyperlipidemia) ()--statin       Cardiomyopathy ([address] ()      Pulmonary hypertension ([address] (6/15/2016)      CHF (congestive heart failure) ([address] (2/17/2017)          Ashlee Bradley NP  2/18/2017 7:42 AM

## 2017-02-18 NOTE — DISCHARGE INSTRUCTIONS
Heart Failure: Care Instructions  Your Care Instructions    Heart failure occurs when your heart does not pump as much blood as the body needs. Failure does not mean that the heart has stopped pumping but rather that it is not pumping as well as it should. Over time, this causes fluid buildup in your lungs and other parts of your body. Fluid buildup can cause shortness of breath, fatigue, swollen ankles, and other problems. By taking medicines regularly, reducing sodium (salt) in your diet, checking your weight every day, and making lifestyle changes, you can feel better and live longer. Follow-up care is a key part of your treatment and safety. Be sure to make and go to all appointments, and call your doctor if you are having problems. It's also a good idea to know your test results and keep a list of the medicines you take. How can you care for yourself at home? Medicines  · Be safe with medicines. Take your medicines exactly as prescribed. Call your doctor if you think you are having a problem with your medicine. · Do not take any vitamins, over-the-counter medicine, or herbal products without talking to your doctor first. Eugena Coop not take ibuprofen (Advil or Motrin) and naproxen (Aleve) without talking to your doctor first. They could make your heart failure worse. · You may be taking some of the following medicine. ¨ Beta-blockers can slow heart rate, decrease blood pressure, and improve your condition. Taking a beta-blocker may lower your chance of needing to be hospitalized. ¨ Angiotensin-converting enzyme inhibitors (ACEIs) reduce the heart's workload, lower blood pressure, and reduce swelling. Taking an ACEI may lower your chance of needing to be hospitalized again. ¨ Angiotensin II receptor blockers (ARBs) work like ACEIs. Your doctor may prescribe them instead of ACEIs. ¨ Diuretics, also called water pills, reduce swelling.   ¨ Potassium supplements replace this important mineral, which is sometimes lost with diuretics. ¨ Aspirin and other blood thinners prevent blood clots, which can cause a stroke or heart attack. You will get more details on the specific medicines your doctor prescribes. Diet  · Your doctor may suggest that you limit sodium to 2,000 milligrams (mg) a day or less. That is less than 1 teaspoon of salt a day, including all the salt you eat in cooking or in packaged foods. People get most of their sodium from processed foods. Fast food and restaurant meals also tend to be very high in sodium. · Ask your doctor how much liquid you can drink each day. You may have to limit liquids. Weight  · Weigh yourself without clothing at the same time each day. Record your weight. Call your doctor if you gain more than 3 pounds in 2 to 3 days. A sudden weight gain may mean that your heart failure is getting worse. Activity level  · Start light exercise (if your doctor says it is okay). Even if you can only do a small amount, exercise will help you get stronger, have more energy, and manage your weight and your stress. Walking is an easy way to get exercise. Start out by walking a little more than you did before. Bit by bit, increase the amount you walk. · When you exercise, watch for signs that your heart is working too hard. You are pushing yourself too hard if you cannot talk while you are exercising. If you become short of breath or dizzy or have chest pain, stop, sit down, and rest.  · If you feel \"wiped out\" the day after you exercise, walk slower or for a shorter distance until you can work up to a better pace. · Get enough rest at night. Sleeping with 1 or 2 pillows under your upper body and head may help you breathe easier. Lifestyle changes  · Do not smoke. Smoking can make a heart condition worse. If you need help quitting, talk to your doctor about stop-smoking programs and medicines. These can increase your chances of quitting for good.  Quitting smoking may be the most important step you can take to protect your heart. · Limit alcohol to 2 drinks a day for men and 1 drink a day for women. Too much alcohol can cause health problems. · Avoid getting sick from colds and the flu. Get a pneumococcal vaccine shot. If you have had one before, ask your doctor whether you need another dose. Get a flu shot each year. If you must be around people with colds or the flu, wash your hands often. When should you call for help? Call 911 if you have symptoms of sudden heart failure such as:  · You have severe trouble breathing. · You cough up pink, foamy mucus. · You have a new irregular or rapid heartbeat. Call your doctor now or seek immediate medical care if:  · You have new or increased shortness of breath. · You are dizzy or lightheaded, or you feel like you may faint. · You have sudden weight gain, such as 3 pounds or more in 2 to 3 days. · You have increased swelling in your legs, ankles, or feet. · You are suddenly so tired or weak that you cannot do your usual activities. Watch closely for changes in your health, and be sure to contact your doctor if:  · You develop new symptoms. Where can you learn more? Go to http://macrina-thuy.info/. Enter J371 in the search box to learn more about \"Heart Failure: Care Instructions. \"  Current as of: January 27, 2016  Content Version: 11.1  © 3330-4074 [employer]. Care instructions adapted under license by [employer] (which disclaims liability or warranty for this information). If you have questions about a medical condition or this instruction, always ask your healthcare professional. [address] any warranty or liability for your use of this information. Heart-Healthy Diet: Care Instructions  Your Care Instructions    A heart-healthy diet has lots of vegetables, fruits, nuts, beans, and whole grains, and is low in salt.  It limits foods that are high in saturated fat, such as meats, cheeses, and fried foods. It may be hard to change your diet, but even small changes can lower your risk of heart attack and heart disease. Follow-up care is a key part of your treatment and safety. Be sure to make and go to all appointments, and call your doctor if you are having problems. It's also a good idea to know your test results and keep a list of the medicines you take. How can you care for yourself at home? Watch your portions  · Learn what a serving is. A \"serving\" and a \"portion\" are not always the same thing. Make sure that you are not eating larger portions than are recommended. For example, a serving of pasta is ½ cup. A serving size of meat is 2 to 3 ounces. A 3-ounce serving is about the size of a deck of cards. Measure serving sizes until you are good at [place]" them. Keep in mind that restaurants often serve portions that are 2 or 3 times the size of one serving. · To keep your energy level up and keep you from feeling hungry, eat often but in smaller portions. · Eat only the number of calories you need to stay at a healthy weight. If you need to lose weight, eat fewer calories than your body burns (through exercise and other physical activity). Eat more fruits and vegetables  · Eat a variety of fruit and vegetables every day. Dark green, deep orange, red, or yellow fruits and vegetables are especially good for you. Examples include spinach, carrots, peaches, and berries. · Keep carrots, celery, and other veggies handy for snacks. Buy fruit that is in season and store it where you can see it so that you will be tempted to eat it. · Cook dishes that have a lot of veggies in them, such as stir-fries and soups. Limit saturated and trans fat  · Read food labels, and try to avoid saturated and trans fats. They increase your risk of heart disease. Trans fat is found in many processed foods such as cookies and crackers. · Use olive or canola oil when you cook.  Try cholesterol-lowering spreads, such as Benecol or Take Control. · Bake, broil, grill, or steam foods instead of frying them. · Choose lean meats instead of high-fat meats such as hot dogs and sausages. Cut off all visible fat when you prepare meat. · Eat fish, skinless poultry, and meat alternatives such as soy products instead of high-fat meats. Soy products, such as tofu, may be especially good for your heart. · Choose low-fat or fat-free milk and dairy products. Eat fish  · Eat at least two servings of fish a week. Certain fish, such as salmon and tuna, contain omega-3 fatty acids, which may help reduce your risk of heart attack. Eat foods high in fiber  · Eat a variety of grain products every day. Include whole-grain foods that have lots of fiber and nutrients. Examples of whole-grain foods include oats, whole wheat bread, and brown rice. · Buy whole-grain breads and cereals, instead of white bread or pastries. Limit salt and sodium  · Limit how much salt and sodium you eat to help lower your blood pressure. · Taste food before you salt it. Add only a little salt when you think you need it. With time, your taste buds will adjust to less salt. · Eat fewer snack items, fast foods, and other high-salt, processed foods. Check food labels for the amount of sodium in packaged foods. · Choose low-sodium versions of canned goods (such as soups, vegetables, and beans). Limit sugar  · Limit drinks and foods with added sugar. These include candy, desserts, and soda pop. Limit alcohol  · Limit alcohol to no more than 2 drinks a day for men and 1 drink a day for women. Too much alcohol can cause health problems. When should you call for help? Watch closely for changes in your health, and be sure to contact your doctor if:  · You would like help planning heart-healthy meals. Where can you learn more? Go to http://macrina-thuy.info/.   Enter V137 in the search box to learn more about \"Heart-Healthy Diet: Care Instructions. \"  Current as of: January 27, 2016  Content Version: 11.1  © 8324-2005 [employer]. Care instructions adapted under license by [employer] (which disclaims liability or warranty for this information). If you have questions about a medical condition or this instruction, always ask your healthcare professional. [address] any warranty or liability for your use of this information.

## 2017-02-18 NOTE — PROGRESS NOTES
Discharge paperwork reviewed with patient, no questions at this time. Peripheral IV removed. Cardiac monitor returned to nurses station.

## 2017-02-18 NOTE — PROGRESS NOTES
Placed pt on her home Trillogy with 6L O2 bleed-in. Pt resting comfortably.      02/18/17 0020   Oxygen Therapy   O2 Sat (%) 92 %   Pulse via Oximetry 56 beats per minute   O2 Device Other (comment)  (Pt's home Trillogy machine)   O2 Flow Rate (L/min) 6 l/min

## 2017-02-20 ENCOUNTER — TELEPHONE (OUTPATIENT)
Dept: HOME HEALTH SERVICES | Facility: HOME HEALTH | Age: 67
End: 2017-02-20

## 2017-02-20 NOTE — TELEPHONE ENCOUNTER
[address] [place] [address] Venus Simon Pharmacist consult. Ms. Saira Rice was discharged Saturday prior to my consult. I spoke with her on the phone today and explained my part of the [facility] team.  I reviewed her discharge medications. She asked about the nitroglycerin tablet she was suppose to take daily. I told her NTG was only if needed. I think she was referring to the isosorbide mononitrate. She said that was it and should she take daily. I told her that was to be taken daily. I explained how it worked and the importance of taking per order daily. She does not have her two new prescriptions, but her aid has gone to get them. The Ciloxin is for her eye infection and she said her eyes felt better, but still felt like she had sand in them. She said she really cannot see, so was unable to take my number. She said it should be on her caller ID. I asked her to call back when aid got home with meds if there were any questions.   2/20/17  5710

## 2017-02-21 ENCOUNTER — HOME CARE VISIT (OUTPATIENT)
Dept: SCHEDULING | Facility: HOME HEALTH | Age: 67
End: 2017-02-21
Payer: MEDICARE

## 2017-02-21 VITALS
DIASTOLIC BLOOD PRESSURE: 86 MMHG | TEMPERATURE: 97.8 F | SYSTOLIC BLOOD PRESSURE: 150 MMHG | HEART RATE: 71 BPM | OXYGEN SATURATION: 94 % | RESPIRATION RATE: 20 BRPM

## 2017-02-21 PROCEDURE — 400013 HH SOC

## 2017-02-21 PROCEDURE — G0299 HHS/HOSPICE OF RN EA 15 MIN: HCPCS

## 2017-02-22 ENCOUNTER — HOME CARE VISIT (OUTPATIENT)
Dept: SCHEDULING | Facility: HOME HEALTH | Age: 67
End: 2017-02-22
Payer: MEDICARE

## 2017-02-22 PROCEDURE — G0151 HHCP-SERV OF PT,EA 15 MIN: HCPCS

## 2017-02-23 ENCOUNTER — HOME CARE VISIT (OUTPATIENT)
Dept: SCHEDULING | Facility: HOME HEALTH | Age: 67
End: 2017-02-23
Payer: MEDICARE

## 2017-02-23 VITALS
TEMPERATURE: 98.2 F | SYSTOLIC BLOOD PRESSURE: 132 MMHG | RESPIRATION RATE: 20 BRPM | HEART RATE: 75 BPM | DIASTOLIC BLOOD PRESSURE: 78 MMHG

## 2017-02-23 VITALS
TEMPERATURE: 97.1 F | SYSTOLIC BLOOD PRESSURE: 169 MMHG | HEART RATE: 74 BPM | RESPIRATION RATE: 18 BRPM | OXYGEN SATURATION: 97 % | DIASTOLIC BLOOD PRESSURE: 74 MMHG

## 2017-02-23 LAB
INR BLD: 2.3 (ref 0.9–1.1)
PT POC: 23.2 SEC

## 2017-02-23 PROCEDURE — G0299 HHS/HOSPICE OF RN EA 15 MIN: HCPCS

## 2017-02-28 ENCOUNTER — HOME CARE VISIT (OUTPATIENT)
Dept: SCHEDULING | Facility: HOME HEALTH | Age: 67
End: 2017-02-28
Payer: MEDICARE

## 2017-02-28 PROCEDURE — G0152 HHCP-SERV OF OT,EA 15 MIN: HCPCS

## 2017-03-01 ENCOUNTER — HOSPITAL ENCOUNTER (OUTPATIENT)
Dept: INFUSION THERAPY | Age: 67
Discharge: HOME | End: 2017-03-01
Payer: MEDICARE

## 2017-03-01 VITALS — OXYGEN SATURATION: 94 % | SYSTOLIC BLOOD PRESSURE: 140 MMHG | HEART RATE: 69 BPM | DIASTOLIC BLOOD PRESSURE: 85 MMHG

## 2017-03-01 LAB
FERRITIN SERPL-MCNC: 37 NG/ML (ref 8–388)
HGB BLD-MCNC: 10.2 G/DL (ref 11.7–15.4)
IRON SATN MFR SERPL: 25 %
IRON SERPL-MCNC: 85 UG/DL (ref 35–150)
TIBC SERPL-MCNC: 337 UG/DL (ref 250–450)

## 2017-03-01 PROCEDURE — 83540 ASSAY OF IRON: CPT | Performed by: INTERNAL MEDICINE

## 2017-03-01 PROCEDURE — 36415 COLL VENOUS BLD VENIPUNCTURE: CPT | Performed by: INTERNAL MEDICINE

## 2017-03-01 PROCEDURE — 85018 HEMOGLOBIN: CPT | Performed by: INTERNAL MEDICINE

## 2017-03-01 PROCEDURE — 82728 ASSAY OF FERRITIN: CPT | Performed by: INTERNAL MEDICINE

## 2017-03-01 NOTE — PROGRESS NOTES
Patient's hgb 10.2 today. No procrit injection given. Patient made aware of lab results and follow up appt 3/29/17 at 1400. Confirmed with Dr. Presley Issa nurse Mona Vizcarra that patient did not need to make any changes to her iron supplements.

## 2017-03-02 ENCOUNTER — HOME CARE VISIT (OUTPATIENT)
Dept: SCHEDULING | Facility: HOME HEALTH | Age: 67
End: 2017-03-02
Payer: MEDICARE

## 2017-03-02 LAB
INR BLD: 2 (ref 0.9–1.1)
PT POC: 23.7 SEC

## 2017-03-02 PROCEDURE — G0299 HHS/HOSPICE OF RN EA 15 MIN: HCPCS

## 2017-03-03 ENCOUNTER — HOME CARE VISIT (OUTPATIENT)
Dept: SCHEDULING | Facility: HOME HEALTH | Age: 67
End: 2017-03-03
Payer: MEDICARE

## 2017-03-03 VITALS
OXYGEN SATURATION: 96 % | SYSTOLIC BLOOD PRESSURE: 116 MMHG | TEMPERATURE: 97 F | HEART RATE: 74 BPM | RESPIRATION RATE: 18 BRPM | DIASTOLIC BLOOD PRESSURE: 64 MMHG

## 2017-03-07 ENCOUNTER — HOME CARE VISIT (OUTPATIENT)
Dept: SCHEDULING | Facility: HOME HEALTH | Age: 67
End: 2017-03-07
Payer: MEDICARE

## 2017-03-07 VITALS
SYSTOLIC BLOOD PRESSURE: 130 MMHG | OXYGEN SATURATION: 93 % | HEART RATE: 77 BPM | DIASTOLIC BLOOD PRESSURE: 62 MMHG | BODY MASS INDEX: 41.15 KG/M2 | WEIGHT: 225 LBS | TEMPERATURE: 96.1 F | RESPIRATION RATE: 18 BRPM

## 2017-03-07 PROCEDURE — G0299 HHS/HOSPICE OF RN EA 15 MIN: HCPCS

## 2017-03-10 ENCOUNTER — HOME CARE VISIT (OUTPATIENT)
Dept: SCHEDULING | Facility: HOME HEALTH | Age: 67
End: 2017-03-10
Payer: MEDICARE

## 2017-03-17 ENCOUNTER — HOSPITAL ENCOUNTER (OUTPATIENT)
Dept: LAB | Age: 67
Discharge: HOME | End: 2017-03-17
Attending: INTERNAL MEDICINE
Payer: MEDICARE

## 2017-03-17 DIAGNOSIS — I50.9 CONGESTIVE HEART FAILURE, UNSPECIFIED CONGESTIVE HEART FAILURE CHRONICITY, UNSPECIFIED CONGESTIVE HEART FAILURE TYPE: ICD-10-CM

## 2017-03-17 LAB
ANION GAP BLD CALC-SCNC: 3 MMOL/L
BUN SERPL-MCNC: 32 MG/DL (ref 8–23)
CALCIUM SERPL-MCNC: 9.7 MG/DL (ref 8.3–10.4)
CHLORIDE SERPL-SCNC: 99 MMOL/L (ref 98–107)
CO2 SERPL-SCNC: 39 MMOL/L (ref 23–32)
CREAT SERPL-MCNC: 1.4 MG/DL (ref 0.6–1)
GLUCOSE SERPL-MCNC: 183 MG/DL (ref 65–100)
POTASSIUM SERPL-SCNC: 4.8 MMOL/L (ref 3.5–5.1)
SODIUM SERPL-SCNC: 141 MMOL/L (ref 136–145)

## 2017-03-17 PROCEDURE — 36415 COLL VENOUS BLD VENIPUNCTURE: CPT | Performed by: INTERNAL MEDICINE

## 2017-03-17 PROCEDURE — 80048 BASIC METABOLIC PNL TOTAL CA: CPT | Performed by: INTERNAL MEDICINE

## 2017-03-29 ENCOUNTER — HOSPITAL ENCOUNTER (OUTPATIENT)
Dept: INFUSION THERAPY | Age: 67
Discharge: HOME | End: 2017-03-29
Payer: MEDICARE

## 2017-03-29 VITALS
DIASTOLIC BLOOD PRESSURE: 51 MMHG | RESPIRATION RATE: 18 BRPM | TEMPERATURE: 98.3 F | SYSTOLIC BLOOD PRESSURE: 118 MMHG | BODY MASS INDEX: 41.99 KG/M2 | HEART RATE: 74 BPM | WEIGHT: 229.6 LBS | OXYGEN SATURATION: 91 %

## 2017-03-29 DIAGNOSIS — D46.9 MDS (MYELODYSPLASTIC SYNDROME) (HCC): ICD-10-CM

## 2017-03-29 DIAGNOSIS — D50.0 IRON DEFICIENCY ANEMIA DUE TO CHRONIC BLOOD LOSS: ICD-10-CM

## 2017-03-29 LAB
FERRITIN SERPL-MCNC: 31 NG/ML (ref 8–388)
HGB BLD-MCNC: 9.9 G/DL (ref 11.7–15.4)
IRON SATN MFR SERPL: 30 %
IRON SERPL-MCNC: 94 UG/DL (ref 35–150)
TIBC SERPL-MCNC: 309 UG/DL (ref 250–450)

## 2017-03-29 PROCEDURE — 74011250636 HC RX REV CODE- 250/636: Performed by: NURSE PRACTITIONER

## 2017-03-29 PROCEDURE — 85018 HEMOGLOBIN: CPT | Performed by: INTERNAL MEDICINE

## 2017-03-29 PROCEDURE — 83540 ASSAY OF IRON: CPT | Performed by: INTERNAL MEDICINE

## 2017-03-29 PROCEDURE — 82728 ASSAY OF FERRITIN: CPT | Performed by: INTERNAL MEDICINE

## 2017-03-29 PROCEDURE — 36415 COLL VENOUS BLD VENIPUNCTURE: CPT | Performed by: INTERNAL MEDICINE

## 2017-03-29 PROCEDURE — 96372 THER/PROPH/DIAG INJ SC/IM: CPT

## 2017-03-29 RX ADMIN — ERYTHROPOIETIN 40000 UNITS: 40000 INJECTION, SOLUTION INTRAVENOUS; SUBCUTANEOUS at 14:45

## 2017-03-29 NOTE — PROGRESS NOTES
Arrived to the [facility]. Procrit completed. Patient tolerated well. Any issues or concerns during appointment: none. Patient aware of next infusion appointment on 4/26/17 at 1400. Discharged ambulatory with daughter.

## 2017-05-24 ENCOUNTER — HOSPITAL ENCOUNTER (OUTPATIENT)
Dept: INFUSION THERAPY | Age: 67
Discharge: HOME | End: 2017-05-24
Payer: MEDICARE

## 2017-05-24 VITALS
OXYGEN SATURATION: 90 % | BODY MASS INDEX: 40.31 KG/M2 | SYSTOLIC BLOOD PRESSURE: 108 MMHG | TEMPERATURE: 97.6 F | HEART RATE: 72 BPM | WEIGHT: 220.4 LBS | RESPIRATION RATE: 18 BRPM | DIASTOLIC BLOOD PRESSURE: 55 MMHG

## 2017-05-24 DIAGNOSIS — D50.0 IRON DEFICIENCY ANEMIA DUE TO CHRONIC BLOOD LOSS: ICD-10-CM

## 2017-05-24 DIAGNOSIS — D46.9 MDS (MYELODYSPLASTIC SYNDROME) (HCC): ICD-10-CM

## 2017-05-24 LAB
BASOPHILS # BLD AUTO: 0 K/UL (ref 0–0.2)
BASOPHILS # BLD: 0 % (ref 0–2)
DIFFERENTIAL METHOD BLD: ABNORMAL
EOSINOPHIL # BLD: 0.1 K/UL (ref 0–0.8)
EOSINOPHIL NFR BLD: 2 % (ref 0.5–7.8)
ERYTHROCYTE [DISTWIDTH] IN BLOOD BY AUTOMATED COUNT: 14 % (ref 11.9–14.6)
HCT VFR BLD AUTO: 29.9 % (ref 35.8–46.3)
HGB BLD-MCNC: 9.4 G/DL (ref 11.7–15.4)
LYMPHOCYTES # BLD AUTO: 26 % (ref 13–44)
LYMPHOCYTES # BLD: 2.2 K/UL (ref 0.5–4.6)
MCH RBC QN AUTO: 28.6 PG (ref 26.1–32.9)
MCHC RBC AUTO-ENTMCNC: 31.4 G/DL (ref 31.4–35)
MCV RBC AUTO: 90.9 FL (ref 79.6–97.8)
MONOCYTES # BLD: 0.7 K/UL (ref 0.1–1.3)
MONOCYTES NFR BLD AUTO: 8 % (ref 4–12)
NEUTS SEG # BLD: 5.6 K/UL (ref 1.7–8.2)
NEUTS SEG NFR BLD AUTO: 65 % (ref 43–78)
NRBC # BLD: 0 K/UL (ref 0–0.2)
PLATELET # BLD AUTO: 196 K/UL (ref 150–450)
PMV BLD AUTO: 10.1 FL (ref 10.8–14.1)
RBC # BLD AUTO: 3.29 M/UL (ref 4.05–5.25)
WBC # BLD AUTO: 8.7 K/UL (ref 4.3–11.1)

## 2017-05-24 PROCEDURE — 85025 COMPLETE CBC W/AUTO DIFF WBC: CPT | Performed by: INTERNAL MEDICINE

## 2017-05-24 PROCEDURE — 74011250636 HC RX REV CODE- 250/636: Performed by: NURSE PRACTITIONER

## 2017-05-24 PROCEDURE — 36415 COLL VENOUS BLD VENIPUNCTURE: CPT | Performed by: INTERNAL MEDICINE

## 2017-05-24 PROCEDURE — 96372 THER/PROPH/DIAG INJ SC/IM: CPT

## 2017-05-24 RX ADMIN — ERYTHROPOIETIN 40000 UNITS: 40000 INJECTION, SOLUTION INTRAVENOUS; SUBCUTANEOUS at 14:39

## 2017-05-24 NOTE — PROGRESS NOTES
Pt arrived ambulatory to [facility]. Hg 9.4 Procrit given sq to abdomen. Pt aware of MD appt on 5/30/17 at 0930. No future t appt with [facility] at this time. Pt discharged ambulatory.

## 2017-07-12 ENCOUNTER — HOSPITAL ENCOUNTER (OUTPATIENT)
Dept: LAB | Age: 67
Discharge: HOME | End: 2017-07-12
Payer: MEDICARE

## 2017-07-12 ENCOUNTER — HOSPITAL ENCOUNTER (OUTPATIENT)
Dept: INFUSION THERAPY | Age: 67
Discharge: HOME | End: 2017-07-12
Payer: MEDICARE

## 2017-07-12 DIAGNOSIS — D46.9 MDS (MYELODYSPLASTIC SYNDROME) (HCC): Chronic | ICD-10-CM

## 2017-07-12 DIAGNOSIS — Z95.2 H/O PROSTHETIC AORTIC VALVE REPLACEMENT: ICD-10-CM

## 2017-07-12 DIAGNOSIS — D46.9 MDS (MYELODYSPLASTIC SYNDROME) (HCC): ICD-10-CM

## 2017-07-12 DIAGNOSIS — D50.0 IRON DEFICIENCY ANEMIA DUE TO CHRONIC BLOOD LOSS: ICD-10-CM

## 2017-07-12 DIAGNOSIS — N18.30 CKD (CHRONIC KIDNEY DISEASE) STAGE 3, GFR 30-59 ML/MIN (HCC): ICD-10-CM

## 2017-07-12 LAB
ALBUMIN SERPL BCP-MCNC: 3.5 G/DL (ref 3.2–4.6)
ALBUMIN/GLOB SERPL: 1 {RATIO} (ref 1.2–3.5)
ALP SERPL-CCNC: 53 U/L (ref 50–136)
ALT SERPL-CCNC: 16 U/L (ref 12–65)
ANION GAP BLD CALC-SCNC: 5 MMOL/L (ref 7–16)
AST SERPL W P-5'-P-CCNC: 16 U/L (ref 15–37)
BASOPHILS # BLD AUTO: 0 K/UL (ref 0–0.2)
BASOPHILS # BLD: 1 % (ref 0–2)
BILIRUB SERPL-MCNC: 0.5 MG/DL (ref 0.2–1.1)
BUN SERPL-MCNC: 54 MG/DL (ref 8–23)
CALCIUM SERPL-MCNC: 9.6 MG/DL (ref 8.3–10.4)
CHLORIDE SERPL-SCNC: 97 MMOL/L (ref 98–107)
CO2 SERPL-SCNC: 35 MMOL/L (ref 21–32)
CREAT SERPL-MCNC: 1.98 MG/DL (ref 0.6–1)
DIFFERENTIAL METHOD BLD: ABNORMAL
EOSINOPHIL # BLD: 0.2 K/UL (ref 0–0.8)
EOSINOPHIL NFR BLD: 2 % (ref 0.5–7.8)
ERYTHROCYTE [DISTWIDTH] IN BLOOD BY AUTOMATED COUNT: 13.1 % (ref 11.9–14.6)
FERRITIN SERPL-MCNC: 26 NG/ML (ref 8–388)
GLOBULIN SER CALC-MCNC: 3.6 G/DL (ref 2.3–3.5)
GLUCOSE SERPL-MCNC: 152 MG/DL (ref 65–100)
HCT VFR BLD AUTO: 25.6 % (ref 35.8–46.3)
HGB BLD-MCNC: 8.2 G/DL (ref 11.7–15.4)
IRON SATN MFR SERPL: 26 %
IRON SERPL-MCNC: 79 UG/DL (ref 35–150)
LYMPHOCYTES # BLD AUTO: 19 % (ref 13–44)
LYMPHOCYTES # BLD: 1.6 K/UL (ref 0.5–4.6)
MCH RBC QN AUTO: 29.9 PG (ref 26.1–32.9)
MCHC RBC AUTO-ENTMCNC: 32 G/DL (ref 31.4–35)
MCV RBC AUTO: 93.4 FL (ref 79.6–97.8)
MONOCYTES # BLD: 0.7 K/UL (ref 0.1–1.3)
MONOCYTES NFR BLD AUTO: 8 % (ref 4–12)
NEUTS SEG # BLD: 6 K/UL (ref 1.7–8.2)
NEUTS SEG NFR BLD AUTO: 71 % (ref 43–78)
NRBC # BLD: 0 K/UL (ref 0–0.2)
PLATELET # BLD AUTO: 156 K/UL (ref 150–450)
PMV BLD AUTO: 9.8 FL (ref 10.8–14.1)
POTASSIUM SERPL-SCNC: 5 MMOL/L (ref 3.5–5.1)
PROT SERPL-MCNC: 7.1 G/DL (ref 6.3–8.2)
RBC # BLD AUTO: 2.74 M/UL (ref 4.05–5.25)
SODIUM SERPL-SCNC: 137 MMOL/L (ref 136–145)
TIBC SERPL-MCNC: 299 UG/DL (ref 250–450)
WBC # BLD AUTO: 8.5 K/UL (ref 4.3–11.1)

## 2017-07-12 PROCEDURE — 82728 ASSAY OF FERRITIN: CPT | Performed by: INTERNAL MEDICINE

## 2017-07-12 PROCEDURE — 36415 COLL VENOUS BLD VENIPUNCTURE: CPT | Performed by: INTERNAL MEDICINE

## 2017-07-12 PROCEDURE — 80053 COMPREHEN METABOLIC PANEL: CPT | Performed by: INTERNAL MEDICINE

## 2017-07-12 PROCEDURE — 83540 ASSAY OF IRON: CPT | Performed by: INTERNAL MEDICINE

## 2017-07-12 PROCEDURE — 96372 THER/PROPH/DIAG INJ SC/IM: CPT

## 2017-07-12 PROCEDURE — 74011250636 HC RX REV CODE- 250/636: Performed by: INTERNAL MEDICINE

## 2017-07-12 PROCEDURE — 85025 COMPLETE CBC W/AUTO DIFF WBC: CPT | Performed by: INTERNAL MEDICINE

## 2017-07-12 RX ADMIN — ERYTHROPOIETIN 40000 UNITS: 40000 INJECTION, SOLUTION INTRAVENOUS; SUBCUTANEOUS at 13:10

## 2017-07-12 NOTE — PROGRESS NOTES
SW received referral from DEWAYNE Grady to assist pt with transportation. SW verified that pt has Medicaid. SW met with pt in infusion to introduce self and services. Pt stated she did have trouble with transportation. SW provided contact information for [employer] and encouraged pt to call 3 days before her appointments to schedule transportation. Pt verbalized understanding. SW offered to coordinate transportation as needed, provided SW contact information and encouraged pt to call should any needs arise. Pt verbalized understanding. SW intends to follow up.

## 2017-07-12 NOTE — PROGRESS NOTES
Arrived to the [facility]. Procrit completed. Patient tolerated well  Any issues or concerns during appointment: No  Patient aware of next infusion appointment on [address] 9 th @ 1400  Discharged home

## 2017-07-14 ENCOUNTER — TELEPHONE (OUTPATIENT)
Dept: ONCOLOGY | Age: 67
End: 2017-07-14

## 2017-07-14 NOTE — TELEPHONE ENCOUNTER
SW followed up with pt via phone regarding Medicaid transportation for her next appointments. Pt stated she had arranged transportation for upcoming appointments but verbalized understanding about scheduling future rides. No other needs identified. SW encouraged pt to call should any needs arise. Pt verbalized understanding. SW intends to follow up PRN.

## 2017-08-11 ENCOUNTER — HOSPITAL ENCOUNTER (OUTPATIENT)
Dept: INFUSION THERAPY | Age: 67
Discharge: HOME | End: 2017-08-11
Payer: MEDICARE

## 2017-08-11 VITALS
HEART RATE: 87 BPM | BODY MASS INDEX: 39.32 KG/M2 | DIASTOLIC BLOOD PRESSURE: 68 MMHG | SYSTOLIC BLOOD PRESSURE: 142 MMHG | TEMPERATURE: 99.3 F | RESPIRATION RATE: 18 BRPM | WEIGHT: 215 LBS | OXYGEN SATURATION: 93 %

## 2017-08-11 DIAGNOSIS — N18.30 CKD (CHRONIC KIDNEY DISEASE) STAGE 3, GFR 30-59 ML/MIN (HCC): ICD-10-CM

## 2017-08-11 DIAGNOSIS — Z95.2 H/O PROSTHETIC AORTIC VALVE REPLACEMENT: ICD-10-CM

## 2017-08-11 DIAGNOSIS — D50.0 IRON DEFICIENCY ANEMIA DUE TO CHRONIC BLOOD LOSS: ICD-10-CM

## 2017-08-11 DIAGNOSIS — D46.9 MDS (MYELODYSPLASTIC SYNDROME) (HCC): ICD-10-CM

## 2017-08-11 LAB
FERRITIN SERPL-MCNC: 32 NG/ML (ref 8–388)
HCT VFR BLD AUTO: 29 % (ref 35.8–46.3)
HGB BLD-MCNC: 9.1 G/DL (ref 11.7–15.4)
IRON SATN MFR SERPL: 26 %
IRON SERPL-MCNC: 76 UG/DL (ref 35–150)
TIBC SERPL-MCNC: 287 UG/DL (ref 250–450)

## 2017-08-11 PROCEDURE — 85018 HEMOGLOBIN: CPT | Performed by: INTERNAL MEDICINE

## 2017-08-11 PROCEDURE — 74011250636 HC RX REV CODE- 250/636: Performed by: INTERNAL MEDICINE

## 2017-08-11 PROCEDURE — 36415 COLL VENOUS BLD VENIPUNCTURE: CPT | Performed by: INTERNAL MEDICINE

## 2017-08-11 PROCEDURE — 82728 ASSAY OF FERRITIN: CPT | Performed by: INTERNAL MEDICINE

## 2017-08-11 PROCEDURE — 83540 ASSAY OF IRON: CPT | Performed by: INTERNAL MEDICINE

## 2017-08-11 PROCEDURE — 96372 THER/PROPH/DIAG INJ SC/IM: CPT

## 2017-08-11 RX ADMIN — ERYTHROPOIETIN 40000 UNITS: 40000 INJECTION, SOLUTION INTRAVENOUS; SUBCUTANEOUS at 16:08

## 2017-09-08 ENCOUNTER — HOSPITAL ENCOUNTER (OUTPATIENT)
Dept: INFUSION THERAPY | Age: 67
Discharge: HOME | End: 2017-09-08
Payer: MEDICARE

## 2017-09-08 VITALS
RESPIRATION RATE: 18 BRPM | OXYGEN SATURATION: 92 % | SYSTOLIC BLOOD PRESSURE: 124 MMHG | DIASTOLIC BLOOD PRESSURE: 62 MMHG | TEMPERATURE: 99.3 F | BODY MASS INDEX: 39.57 KG/M2 | HEART RATE: 73 BPM | WEIGHT: 223.4 LBS

## 2017-09-08 DIAGNOSIS — D46.9 MDS (MYELODYSPLASTIC SYNDROME) (HCC): ICD-10-CM

## 2017-09-08 DIAGNOSIS — D50.0 IRON DEFICIENCY ANEMIA DUE TO CHRONIC BLOOD LOSS: ICD-10-CM

## 2017-09-08 LAB
FERRITIN SERPL-MCNC: 23 NG/ML (ref 8–388)
HGB BLD-MCNC: 8.4 G/DL (ref 11.7–15.4)
IRON SATN MFR SERPL: 25 %
IRON SERPL-MCNC: 75 UG/DL (ref 35–150)
TIBC SERPL-MCNC: 305 UG/DL (ref 250–450)

## 2017-09-08 RX ADMIN — ERYTHROPOIETIN 40000 UNITS: 40000 INJECTION, SOLUTION INTRAVENOUS; SUBCUTANEOUS at 15:40

## 2017-09-08 NOTE — PROGRESS NOTES
Arrived to the [facility]. Assessment completed. Patient tolerated procrit inection well today. Hemoglobin noted at 8.4 today. Any issues or concerns during appointment: none. Patient aware of next infusion appointment on 9/29/17 (date) at 1600 (time) with IV infusion center. Discharged ambulatory, with daughter. Patient instructed to call Dr. Leobardo Garcia office immediately for any problems or concerns. She verbalizes understanding.

## 2017-09-29 ENCOUNTER — HOSPITAL ENCOUNTER (OUTPATIENT)
Dept: INFUSION THERAPY | Age: 67
Discharge: HOME | End: 2017-09-29
Payer: MEDICARE

## 2017-09-29 VITALS
TEMPERATURE: 99.2 F | SYSTOLIC BLOOD PRESSURE: 109 MMHG | DIASTOLIC BLOOD PRESSURE: 51 MMHG | OXYGEN SATURATION: 90 % | BODY MASS INDEX: 39.18 KG/M2 | WEIGHT: 221.2 LBS | RESPIRATION RATE: 18 BRPM | HEART RATE: 80 BPM

## 2017-09-29 DIAGNOSIS — D50.0 IRON DEFICIENCY ANEMIA DUE TO CHRONIC BLOOD LOSS: ICD-10-CM

## 2017-09-29 DIAGNOSIS — D46.9 MDS (MYELODYSPLASTIC SYNDROME) (HCC): ICD-10-CM

## 2017-09-29 LAB — HGB BLD-MCNC: 9.2 G/DL (ref 11.7–15.4)

## 2017-09-29 PROCEDURE — 74011250636 HC RX REV CODE- 250/636: Performed by: INTERNAL MEDICINE

## 2017-09-29 PROCEDURE — 36415 COLL VENOUS BLD VENIPUNCTURE: CPT | Performed by: INTERNAL MEDICINE

## 2017-09-29 PROCEDURE — 96372 THER/PROPH/DIAG INJ SC/IM: CPT

## 2017-09-29 PROCEDURE — 85018 HEMOGLOBIN: CPT | Performed by: INTERNAL MEDICINE

## 2017-09-29 RX ADMIN — ERYTHROPOIETIN 40000 UNITS: 40000 INJECTION, SOLUTION INTRAVENOUS; SUBCUTANEOUS at 16:20

## 2017-09-29 NOTE — PROGRESS NOTES
Arrived to the [facility]. Procrit completed. Patient tolerated well  Any issues or concerns during appointment: No  Patient aware of next infusion appointment on Friday,October 13th @ 1400  Discharged home ambulatory with daughter

## 2017-10-13 ENCOUNTER — HOSPITAL ENCOUNTER (OUTPATIENT)
Dept: INFUSION THERAPY | Age: 67
End: 2017-10-13

## 2017-10-30 ENCOUNTER — HOSPITAL ENCOUNTER (OUTPATIENT)
Dept: INFUSION THERAPY | Age: 67
Discharge: HOME | End: 2017-10-30
Payer: MEDICARE

## 2017-10-30 ENCOUNTER — HOSPITAL ENCOUNTER (OUTPATIENT)
Dept: LAB | Age: 67
Discharge: HOME | End: 2017-10-30
Payer: MEDICARE

## 2017-10-30 DIAGNOSIS — D46.9 MDS (MYELODYSPLASTIC SYNDROME) (HCC): Chronic | ICD-10-CM

## 2017-10-30 DIAGNOSIS — D46.9 MDS (MYELODYSPLASTIC SYNDROME) (HCC): ICD-10-CM

## 2017-10-30 DIAGNOSIS — D50.0 IRON DEFICIENCY ANEMIA DUE TO CHRONIC BLOOD LOSS: ICD-10-CM

## 2017-10-30 LAB
BASOPHILS # BLD: 0 K/UL (ref 0–0.2)
BASOPHILS NFR BLD: 0 % (ref 0–2)
DIFFERENTIAL METHOD BLD: ABNORMAL
EOSINOPHIL # BLD: 0.1 K/UL (ref 0–0.8)
EOSINOPHIL NFR BLD: 2 % (ref 0.5–7.8)
ERYTHROCYTE [DISTWIDTH] IN BLOOD BY AUTOMATED COUNT: 13.2 % (ref 11.9–14.6)
FERRITIN SERPL-MCNC: 23 NG/ML (ref 8–388)
HCT VFR BLD AUTO: 29.2 % (ref 35.8–46.3)
HGB BLD-MCNC: 9.1 G/DL (ref 11.7–15.4)
IRON SATN MFR SERPL: 16 %
IRON SERPL-MCNC: 46 UG/DL (ref 35–150)
LYMPHOCYTES # BLD: 1.2 K/UL (ref 0.5–4.6)
LYMPHOCYTES NFR BLD: 13 % (ref 13–44)
MCH RBC QN AUTO: 29.7 PG (ref 26.1–32.9)
MCHC RBC AUTO-ENTMCNC: 31.2 G/DL (ref 31.4–35)
MCV RBC AUTO: 95.4 FL (ref 79.6–97.8)
MONOCYTES # BLD: 0.5 K/UL (ref 0.1–1.3)
MONOCYTES NFR BLD: 6 % (ref 4–12)
NEUTS SEG # BLD: 6.8 K/UL (ref 1.7–8.2)
NEUTS SEG NFR BLD: 79 % (ref 43–78)
NRBC # BLD: 0 K/UL (ref 0–0.2)
PLATELET # BLD AUTO: 152 K/UL (ref 150–450)
PMV BLD AUTO: 10.4 FL (ref 10.8–14.1)
RBC # BLD AUTO: 3.06 M/UL (ref 4.05–5.25)
TIBC SERPL-MCNC: 289 UG/DL (ref 250–450)
WBC # BLD AUTO: 8.6 K/UL (ref 4.3–11.1)

## 2017-10-30 PROCEDURE — 96372 THER/PROPH/DIAG INJ SC/IM: CPT

## 2017-10-30 PROCEDURE — 36415 COLL VENOUS BLD VENIPUNCTURE: CPT | Performed by: INTERNAL MEDICINE

## 2017-10-30 PROCEDURE — 74011250636 HC RX REV CODE- 250/636: Performed by: INTERNAL MEDICINE

## 2017-10-30 PROCEDURE — 83540 ASSAY OF IRON: CPT | Performed by: INTERNAL MEDICINE

## 2017-10-30 PROCEDURE — 85025 COMPLETE CBC W/AUTO DIFF WBC: CPT | Performed by: INTERNAL MEDICINE

## 2017-10-30 PROCEDURE — 82728 ASSAY OF FERRITIN: CPT | Performed by: INTERNAL MEDICINE

## 2017-10-30 RX ADMIN — ERYTHROPOIETIN 40000 UNITS: 40000 INJECTION, SOLUTION INTRAVENOUS; SUBCUTANEOUS at 16:45

## 2017-11-20 ENCOUNTER — HOSPITAL ENCOUNTER (OUTPATIENT)
Dept: INFUSION THERAPY | Age: 67
Discharge: HOME | End: 2017-11-20
Payer: MEDICARE

## 2017-11-20 ENCOUNTER — HOSPITAL ENCOUNTER (OUTPATIENT)
Dept: MAMMOGRAPHY | Age: 67
Discharge: HOME | End: 2017-11-20
Attending: INTERNAL MEDICINE
Payer: MEDICARE

## 2017-11-20 VITALS
SYSTOLIC BLOOD PRESSURE: 116 MMHG | RESPIRATION RATE: 18 BRPM | DIASTOLIC BLOOD PRESSURE: 54 MMHG | WEIGHT: 223.6 LBS | BODY MASS INDEX: 39.61 KG/M2 | TEMPERATURE: 98.6 F | HEART RATE: 85 BPM | OXYGEN SATURATION: 90 %

## 2017-11-20 DIAGNOSIS — D46.9 MDS (MYELODYSPLASTIC SYNDROME) (HCC): ICD-10-CM

## 2017-11-20 DIAGNOSIS — D50.0 IRON DEFICIENCY ANEMIA DUE TO CHRONIC BLOOD LOSS: ICD-10-CM

## 2017-11-20 DIAGNOSIS — Z12.31 VISIT FOR SCREENING MAMMOGRAM: ICD-10-CM

## 2017-11-20 LAB
FERRITIN SERPL-MCNC: 24 NG/ML (ref 8–388)
HGB BLD-MCNC: 9 G/DL (ref 11.7–15.4)
IRON SATN MFR SERPL: 20 %
IRON SERPL-MCNC: 58 UG/DL (ref 35–150)
TIBC SERPL-MCNC: 292 UG/DL (ref 250–450)

## 2017-11-20 PROCEDURE — 74011250636 HC RX REV CODE- 250/636: Performed by: INTERNAL MEDICINE

## 2017-11-20 PROCEDURE — 96372 THER/PROPH/DIAG INJ SC/IM: CPT

## 2017-11-20 PROCEDURE — 77067 SCR MAMMO BI INCL CAD: CPT

## 2017-11-20 PROCEDURE — 85018 HEMOGLOBIN: CPT | Performed by: INTERNAL MEDICINE

## 2017-11-20 PROCEDURE — 36415 COLL VENOUS BLD VENIPUNCTURE: CPT | Performed by: INTERNAL MEDICINE

## 2017-11-20 PROCEDURE — 82728 ASSAY OF FERRITIN: CPT | Performed by: INTERNAL MEDICINE

## 2017-11-20 PROCEDURE — 83540 ASSAY OF IRON: CPT | Performed by: INTERNAL MEDICINE

## 2017-11-20 RX ADMIN — ERYTHROPOIETIN 40000 UNITS: 40000 INJECTION, SOLUTION INTRAVENOUS; SUBCUTANEOUS at 09:38

## 2017-11-20 NOTE — PROGRESS NOTES
Arrived to the [facility]. Labs reviewed; Procrit completed. Patient tolerated well. Any issues or concerns during appointment: none. Patient aware of next infusion appointment on 12/11/17 at 1000. Discharged ambulatory accompanied by daughter.     Aquiles Feng RN

## 2017-12-04 ENCOUNTER — APPOINTMENT (OUTPATIENT)
Dept: INFUSION THERAPY | Age: 67
End: 2017-12-04

## 2017-12-11 ENCOUNTER — HOSPITAL ENCOUNTER (OUTPATIENT)
Dept: INFUSION THERAPY | Age: 67
End: 2017-12-11

## 2017-12-18 PROBLEM — E11.21 TYPE 2 DIABETES MELLITUS WITH NEPHROPATHY (HCC): Status: ACTIVE | Noted: 2017-12-18

## 2017-12-19 ENCOUNTER — HOSPITAL ENCOUNTER (OUTPATIENT)
Dept: INFUSION THERAPY | Age: 67
Discharge: HOME | End: 2017-12-19

## 2017-12-19 DIAGNOSIS — D50.0 IRON DEFICIENCY ANEMIA DUE TO CHRONIC BLOOD LOSS: ICD-10-CM

## 2017-12-19 DIAGNOSIS — N18.30 CKD (CHRONIC KIDNEY DISEASE) STAGE 3, GFR 30-59 ML/MIN (HCC): ICD-10-CM

## 2017-12-19 DIAGNOSIS — Z95.2 S/P AVR (AORTIC VALVE REPLACEMENT): ICD-10-CM

## 2017-12-19 DIAGNOSIS — D46.9 MDS (MYELODYSPLASTIC SYNDROME) (HCC): ICD-10-CM

## 2017-12-22 ENCOUNTER — HOSPITAL ENCOUNTER (OUTPATIENT)
Dept: INFUSION THERAPY | Age: 67
Discharge: HOME | End: 2017-12-22
Payer: MEDICARE

## 2017-12-22 VITALS
DIASTOLIC BLOOD PRESSURE: 54 MMHG | HEART RATE: 75 BPM | RESPIRATION RATE: 18 BRPM | OXYGEN SATURATION: 100 % | SYSTOLIC BLOOD PRESSURE: 106 MMHG | TEMPERATURE: 98.6 F

## 2017-12-22 DIAGNOSIS — D50.0 IRON DEFICIENCY ANEMIA DUE TO CHRONIC BLOOD LOSS: ICD-10-CM

## 2017-12-22 DIAGNOSIS — D46.9 MDS (MYELODYSPLASTIC SYNDROME) (HCC): ICD-10-CM

## 2017-12-22 LAB
FERRITIN SERPL-MCNC: 26 NG/ML (ref 8–388)
HGB BLD-MCNC: 8.4 G/DL (ref 11.7–15.4)
IRON SATN MFR SERPL: 28 %
IRON SERPL-MCNC: 73 UG/DL (ref 35–150)
TIBC SERPL-MCNC: 260 UG/DL (ref 250–450)

## 2017-12-22 PROCEDURE — 82728 ASSAY OF FERRITIN: CPT | Performed by: INTERNAL MEDICINE

## 2017-12-22 PROCEDURE — 85018 HEMOGLOBIN: CPT | Performed by: INTERNAL MEDICINE

## 2017-12-22 PROCEDURE — 96372 THER/PROPH/DIAG INJ SC/IM: CPT

## 2017-12-22 PROCEDURE — 74011250636 HC RX REV CODE- 250/636: Performed by: INTERNAL MEDICINE

## 2017-12-22 PROCEDURE — 83540 ASSAY OF IRON: CPT | Performed by: INTERNAL MEDICINE

## 2017-12-22 PROCEDURE — 36415 COLL VENOUS BLD VENIPUNCTURE: CPT | Performed by: INTERNAL MEDICINE

## 2017-12-22 RX ADMIN — ERYTHROPOIETIN 40000 UNITS: 40000 INJECTION, SOLUTION INTRAVENOUS; SUBCUTANEOUS at 17:18

## 2017-12-22 NOTE — PROGRESS NOTES
Pt. Discharged ambulatory accompanied by family. Tolerated injection well. No distress noted. To return to Infusions on 1/12/18.

## 2018-01-02 ENCOUNTER — APPOINTMENT (OUTPATIENT)
Dept: INFUSION THERAPY | Age: 68
End: 2018-01-02

## 2018-01-22 ENCOUNTER — APPOINTMENT (OUTPATIENT)
Dept: INFUSION THERAPY | Age: 68
End: 2018-01-22

## 2018-02-02 ENCOUNTER — HOSPITAL ENCOUNTER (OUTPATIENT)
Dept: LAB | Age: 68
Discharge: HOME | End: 2018-02-02
Payer: MEDICARE

## 2018-02-02 ENCOUNTER — HOSPITAL ENCOUNTER (OUTPATIENT)
Dept: INFUSION THERAPY | Age: 68
Discharge: HOME | End: 2018-02-02
Payer: MEDICARE

## 2018-02-02 VITALS — WEIGHT: 225 LBS | BODY MASS INDEX: 39.86 KG/M2

## 2018-02-02 DIAGNOSIS — D50.0 IRON DEFICIENCY ANEMIA DUE TO CHRONIC BLOOD LOSS: ICD-10-CM

## 2018-02-02 DIAGNOSIS — D46.9 MDS (MYELODYSPLASTIC SYNDROME) (HCC): Chronic | ICD-10-CM

## 2018-02-02 DIAGNOSIS — D46.9 MDS (MYELODYSPLASTIC SYNDROME) (HCC): ICD-10-CM

## 2018-02-02 LAB
ALBUMIN SERPL-MCNC: 3.4 G/DL (ref 3.2–4.6)
ALBUMIN/GLOB SERPL: 0.9 {RATIO} (ref 1.2–3.5)
ALP SERPL-CCNC: 54 U/L (ref 50–136)
ALT SERPL-CCNC: 19 U/L (ref 12–65)
ANION GAP SERPL CALC-SCNC: 4 MMOL/L (ref 7–16)
AST SERPL-CCNC: 20 U/L (ref 15–37)
BASOPHILS # BLD: 0 K/UL (ref 0–0.2)
BASOPHILS NFR BLD: 0 % (ref 0–2)
BILIRUB SERPL-MCNC: 0.4 MG/DL (ref 0.2–1.1)
BUN SERPL-MCNC: 34 MG/DL (ref 8–23)
CALCIUM SERPL-MCNC: 9.5 MG/DL (ref 8.3–10.4)
CHLORIDE SERPL-SCNC: 96 MMOL/L (ref 98–107)
CO2 SERPL-SCNC: 42 MMOL/L (ref 21–32)
CREAT SERPL-MCNC: 1.28 MG/DL (ref 0.6–1)
DIFFERENTIAL METHOD BLD: ABNORMAL
EOSINOPHIL # BLD: 0.1 K/UL (ref 0–0.8)
EOSINOPHIL NFR BLD: 1 % (ref 0.5–7.8)
ERYTHROCYTE [DISTWIDTH] IN BLOOD BY AUTOMATED COUNT: 13.2 % (ref 11.9–14.6)
FERRITIN SERPL-MCNC: 21 NG/ML (ref 8–388)
GLOBULIN SER CALC-MCNC: 3.6 G/DL (ref 2.3–3.5)
GLUCOSE SERPL-MCNC: 108 MG/DL (ref 65–100)
HCT VFR BLD AUTO: 27.2 % (ref 35.8–46.3)
HGB BLD-MCNC: 8.4 G/DL (ref 11.7–15.4)
IRON SATN MFR SERPL: 23 %
IRON SERPL-MCNC: 64 UG/DL (ref 35–150)
LYMPHOCYTES # BLD: 1.3 K/UL (ref 0.5–4.6)
LYMPHOCYTES NFR BLD: 19 % (ref 13–44)
MCH RBC QN AUTO: 29.7 PG (ref 26.1–32.9)
MCHC RBC AUTO-ENTMCNC: 30.9 G/DL (ref 31.4–35)
MCV RBC AUTO: 96.1 FL (ref 79.6–97.8)
MONOCYTES # BLD: 0.5 K/UL (ref 0.1–1.3)
MONOCYTES NFR BLD: 7 % (ref 4–12)
NEUTS SEG # BLD: 5.1 K/UL (ref 1.7–8.2)
NEUTS SEG NFR BLD: 72 % (ref 43–78)
NRBC # BLD: 0 K/UL (ref 0–0.2)
PLATELET # BLD AUTO: 144 K/UL (ref 150–450)
PMV BLD AUTO: 10.7 FL (ref 10.8–14.1)
POTASSIUM SERPL-SCNC: 4.3 MMOL/L (ref 3.5–5.1)
PROT SERPL-MCNC: 7 G/DL (ref 6.3–8.2)
RBC # BLD AUTO: 2.83 M/UL (ref 4.05–5.25)
SODIUM SERPL-SCNC: 142 MMOL/L (ref 136–145)
TIBC SERPL-MCNC: 284 UG/DL (ref 250–450)
WBC # BLD AUTO: 7.1 K/UL (ref 4.3–11.1)

## 2018-02-02 PROCEDURE — 80053 COMPREHEN METABOLIC PANEL: CPT | Performed by: INTERNAL MEDICINE

## 2018-02-02 PROCEDURE — 83540 ASSAY OF IRON: CPT | Performed by: INTERNAL MEDICINE

## 2018-02-02 PROCEDURE — 82728 ASSAY OF FERRITIN: CPT | Performed by: INTERNAL MEDICINE

## 2018-02-02 PROCEDURE — 36415 COLL VENOUS BLD VENIPUNCTURE: CPT | Performed by: INTERNAL MEDICINE

## 2018-02-02 PROCEDURE — 85025 COMPLETE CBC W/AUTO DIFF WBC: CPT | Performed by: INTERNAL MEDICINE

## 2018-02-02 PROCEDURE — 74011250636 HC RX REV CODE- 250/636: Performed by: INTERNAL MEDICINE

## 2018-02-02 PROCEDURE — 96372 THER/PROPH/DIAG INJ SC/IM: CPT

## 2018-02-02 RX ADMIN — ERYTHROPOIETIN 40000 UNITS: 40000 INJECTION, SOLUTION INTRAVENOUS; SUBCUTANEOUS at 16:05

## 2018-02-02 NOTE — PROGRESS NOTES
Problem: Anemia Care Plan (Adult and Pediatric)  Goal: *Labs within defined limits  Outcome: Progressing Towards Goal  Verbalizes/demonstrates understanding of purpose/procedure/potential side effects of procrit.

## 2018-02-02 NOTE — PROGRESS NOTES
Pt arrived ambulatory today at 1550, to receive Procrit. Pt tolerated without difficulty. Patient discharged via ambulatory accompanied by daughter. Instructed to notify physician of any problems, questions or concerns. Allowed opportunity for patient/family to ask questions. Verbalized understanding. Next appointment is March 2 at 1400 with Declan Virk.

## 2018-03-02 ENCOUNTER — HOSPITAL ENCOUNTER (OUTPATIENT)
Dept: INFUSION THERAPY | Age: 68
Discharge: HOME | End: 2018-03-02
Payer: MEDICARE

## 2018-03-02 VITALS
WEIGHT: 215 LBS | OXYGEN SATURATION: 91 % | RESPIRATION RATE: 16 BRPM | BODY MASS INDEX: 37.49 KG/M2 | TEMPERATURE: 97.3 F | DIASTOLIC BLOOD PRESSURE: 65 MMHG | HEART RATE: 86 BPM | SYSTOLIC BLOOD PRESSURE: 145 MMHG

## 2018-03-02 DIAGNOSIS — D50.0 IRON DEFICIENCY ANEMIA DUE TO CHRONIC BLOOD LOSS: ICD-10-CM

## 2018-03-02 DIAGNOSIS — D46.9 MDS (MYELODYSPLASTIC SYNDROME) (HCC): ICD-10-CM

## 2018-03-02 LAB
FERRITIN SERPL-MCNC: 20 NG/ML (ref 8–388)
HGB BLD-MCNC: 9.4 G/DL (ref 11.7–15.4)
IRON SATN MFR SERPL: 17 %
IRON SERPL-MCNC: 52 UG/DL (ref 35–150)
TIBC SERPL-MCNC: 314 UG/DL (ref 250–450)

## 2018-03-02 PROCEDURE — 96372 THER/PROPH/DIAG INJ SC/IM: CPT

## 2018-03-02 PROCEDURE — 36415 COLL VENOUS BLD VENIPUNCTURE: CPT | Performed by: INTERNAL MEDICINE

## 2018-03-02 PROCEDURE — 85018 HEMOGLOBIN: CPT | Performed by: INTERNAL MEDICINE

## 2018-03-02 PROCEDURE — 82728 ASSAY OF FERRITIN: CPT | Performed by: INTERNAL MEDICINE

## 2018-03-02 PROCEDURE — 83540 ASSAY OF IRON: CPT | Performed by: INTERNAL MEDICINE

## 2018-03-02 PROCEDURE — 74011250636 HC RX REV CODE- 250/636: Performed by: INTERNAL MEDICINE

## 2018-03-02 RX ADMIN — ERYTHROPOIETIN 40000 UNITS: 40000 INJECTION, SOLUTION INTRAVENOUS; SUBCUTANEOUS at 13:49

## 2018-03-02 NOTE — PROGRESS NOTES
Arrived to the [facility]. Procrit completed. Patient tolerated well. Any issues or concerns during appointment: none. Patient aware of next infusion appointment on 3-30-18 (date) at 2pm (time). Discharged via ambulatory.

## 2018-03-02 NOTE — PROGRESS NOTES
Massage THERAPY: Daily Note    Referring Physician: Leonardo Kelly MD  Medical/Referring Diagnosis: CHRIS (iron deficiency anemia) [D50.9]   Precautions/Allergies: Sulfa (sulfonamide antibiotics) and Other medication  SUBJECTIVE:  Present Symptoms: hand pain, arthritis     Pre-Treatment Pain: 5/10   Neuropathy Scale:  0/10  Past Medical History:    Ms. Kandace Herbert  has a past medical history of Anticoagulated on Coumadin (2013); Benign hypertension; CAD (coronary artery disease) (2013); Cardiomyopathy ([facility] Chronic respiratory failure ([facility] (2014); CKD (chronic kidney disease) stage 3, GFR 30-59 ml/min (7/10/2013); COPD (chronic obstructive pulmonary disease) ([facility] (2014); Essential hypertension, benign (2013); HLD (hyperlipidemia); ICD (implantable cardioverter-defibrillator) in place (10/2/2014); Iron deficiency anemia due to chronic blood loss (2009); MDS (myelodysplastic syndrome) ([facility] (2011); REJI (obstructive sleep apnea)-cpap (2014); Osteoarthritis; Osteopenia; Quadrantanopia; Secondary pulmonary hypertension; Tachycardia; and Visual complaint (2015). She also has no past medical history of Contact dermatitis and other eczema, due to unspecified cause; Difficult intubation; Malignant hyperthermia due to anesthesia; Nausea & vomiting; Pseudocholinesterase deficiency; or Unspecified adverse effect of anesthesia. Ms. Kandace Herbert  has a past surgical history that includes cardiac catheterization (); ir bx bone marrow diagnostic (2011); hx aortic valve replacement (, ); hx  section; hx tubal ligation; hx heart catheterization (); hx coronary stent placement (May, 2014); hx pacemaker (10/2/2014); and hx endoscopy (2009). Current Medications:       Current Outpatient Prescriptions:     umeclidinium (INCRUSE ELLIPTA) 62.5 mcg/actuation inhaler, Take 1 Puff by inhalation daily.  Indications: j44.9, Disp: 1 Inhaler, Rfl: 11    warfarin (COUMADIN) 5 mg tablet, Take 1 Tab by mouth daily. , Disp: 90 Tab, Rfl: 2    sacubitril-valsartan (ENTRESTO) 24 mg/26 mg tablet, Take 1 Tab by mouth two (2) times a day. INDICATIONS: CHRONIC HEART FAILURE, Disp: 60 Tab, Rfl: 6    escitalopram oxalate (LEXAPRO) 10 mg tablet, TAKE 1 TAB BY MOUTH DAILY. INDICATIONS: ANXIETY WITH DEPRESSION, Disp: 90 Tab, Rfl: 0    traMADol (ULTRAM) 50 mg tablet, TAKE 1 TABLET BY MOUTH EVERY 4 HOURS AS NEEDED, Disp: 180 Tab, Rfl: 1    calcium citrate 200 mg (950 mg) tablet, TAKE 1 TAB BY MOUTH TWO (2) TIMES A DAY., Disp: 180 Tab, Rfl: 1    ferrous gluconate 324 mg (38 mg iron) tablet, TAKE 1 TAB BY MOUTH DAILY (BEFORE BREAKFAST). INDICATIONS: IRON DEFICIENCY ANEMIA, Disp: 90 Tab, Rfl: 3    isosorbide mononitrate ER (IMDUR) 30 mg tablet, TAKE 1 TAB BY MOUTH EVERY MORNING., Disp: 90 Tab, Rfl: 3    carvedilol (COREG) 6.25 mg tablet, TAKE 1 TAB BY MOUTH TWO (2) TIMES DAILY (WITH MEALS). INDICATIONS: HYPERTENSION, Disp: 180 Tab, Rfl: 0    loratadine (CLARITIN) 10 mg tablet, TAKE 1 TAB BY MOUTH DAILY. (Patient taking differently: TAKE 1 TAB BY MOUTH DAILY PRN), Disp: 30 Tab, Rfl: 1    simvastatin (ZOCOR) 20 mg tablet, TAKE 1 TABLET BY MOUTH EVERY DAY, Disp: 90 Tab, Rfl: 0    mometasone-formoterol (DULERA) 200-5 mcg/actuation HFA inhaler, 2 puffs bid, rinse mouth after use., Disp: 1 Inhaler, Rfl: 11    albuterol (PROVENTIL VENTOLIN) 2.5 mg /3 mL (0.083 %) nebulizer solution, 3 mL by Nebulization route every four (4) hours as needed for Wheezing., Disp: 120 Each, Rfl: 11    albuterol (VENTOLIN HFA) 90 mcg/actuation inhaler, 2 puffs qid prn, Disp: 1 Inhaler, Rfl: 11    furosemide (LASIX) 40 mg tablet, Take 2 Tabs by mouth two (2) times a day. (Patient taking differently: Take 40 mg by mouth daily.), Disp: 120 Tab, Rfl: 6    cholecalciferol, VITAMIN D3, (VITAMIN D3) 5,000 unit tab tablet, Take 1 Tab by mouth daily. , Disp: 90 Tab, Rfl: 4    fluticasone (FLONASE) 50 mcg/actuation nasal spray, 2 Sprays by Both Nostrils route daily as needed. , Disp: , Rfl: 10    acetaminophen (TYLENOL) 325 mg tablet, Take 650 mg by mouth every four (4) hours as needed for Pain., Disp: , Rfl:     OXYGEN-AIR DELIVERY SYSTEMS, 3 L by Nasal route continuous. , Disp: , Rfl:     nitroglycerin (NITROSTAT) 0.4 mg SL tablet, 0.4 mg by SubLINGual route every five (5) minutes as needed. , Disp: , Rfl:     aspirin delayed-release (ASPIR-81) 81 mg tablet, Take 81 mg by mouth every morning., Disp: , Rfl:   No current facility-administered medications for this encounter. OBJECTIVE/ASSESSMENT:  Observations of Patient:  Enjoyed massage  Response To Treatment: Said she wanted to go to sleep, pain reduced   Post-Treatment Pain: 1/10   Neuropathy Scale:  0/10  TREATMENT:    (In addition to Assessment/Re-Assessment sessions the following treatments were rendered)  Treatment Provided:  [x]  Soft tissue massage  []  Healing Touch   Location: forearm(s) bilaterally and hand(s) bilaterally  Patient Position: Seated  Time: 15 minutes    PLAN OF CARE:    []  I will follow up with this patient as needed. [x]  No follow up visit necessary.     Thank you for this referral.  Gabby Norwood LMT

## 2018-04-04 ENCOUNTER — HOSPITAL ENCOUNTER (OUTPATIENT)
Dept: INFUSION THERAPY | Age: 68
Discharge: HOME | End: 2018-04-04
Payer: MEDICARE

## 2018-04-04 VITALS
TEMPERATURE: 98.5 F | BODY MASS INDEX: 38.6 KG/M2 | DIASTOLIC BLOOD PRESSURE: 41 MMHG | OXYGEN SATURATION: 92 % | WEIGHT: 221.4 LBS | SYSTOLIC BLOOD PRESSURE: 112 MMHG | HEART RATE: 79 BPM | RESPIRATION RATE: 16 BRPM

## 2018-04-04 DIAGNOSIS — D46.9 MDS (MYELODYSPLASTIC SYNDROME) (HCC): Chronic | ICD-10-CM

## 2018-04-04 DIAGNOSIS — D50.0 IRON DEFICIENCY ANEMIA DUE TO CHRONIC BLOOD LOSS: ICD-10-CM

## 2018-04-04 LAB
FERRITIN SERPL-MCNC: 29 NG/ML (ref 8–388)
HGB BLD-MCNC: 8.7 G/DL (ref 11.7–15.4)
IRON SATN MFR SERPL: 22 %
IRON SERPL-MCNC: 62 UG/DL (ref 35–150)
TIBC SERPL-MCNC: 286 UG/DL (ref 250–450)

## 2018-04-04 PROCEDURE — 74011250636 HC RX REV CODE- 250/636: Performed by: INTERNAL MEDICINE

## 2018-04-04 PROCEDURE — 96372 THER/PROPH/DIAG INJ SC/IM: CPT

## 2018-04-04 PROCEDURE — 36415 COLL VENOUS BLD VENIPUNCTURE: CPT | Performed by: INTERNAL MEDICINE

## 2018-04-04 PROCEDURE — 83540 ASSAY OF IRON: CPT | Performed by: INTERNAL MEDICINE

## 2018-04-04 PROCEDURE — 82728 ASSAY OF FERRITIN: CPT | Performed by: INTERNAL MEDICINE

## 2018-04-04 PROCEDURE — 85018 HEMOGLOBIN: CPT | Performed by: INTERNAL MEDICINE

## 2018-04-04 RX ADMIN — ERYTHROPOIETIN 40000 UNITS: 40000 INJECTION, SOLUTION INTRAVENOUS; SUBCUTANEOUS at 16:26

## 2018-04-04 NOTE — PROGRESS NOTES
Arrived to the [facility]. Procrit completed. Patient tolerated well. Any issues or concerns during appointment: none. Patient aware of next infusion appointment on 4/27/18 at 1500. Discharged ambulatory.     Meka Guzmán RN

## 2018-04-05 PROBLEM — F34.1 DYSTHYMIA: Status: ACTIVE | Noted: 2018-04-05

## 2018-04-19 PROBLEM — Z79.01 LONG TERM (CURRENT) USE OF ANTICOAGULANTS: Status: ACTIVE | Noted: 2018-04-19

## 2018-04-27 ENCOUNTER — APPOINTMENT (OUTPATIENT)
Dept: GENERAL RADIOLOGY | Age: 68
DRG: 563 | End: 2018-04-27
Attending: EMERGENCY MEDICINE
Payer: MEDICARE

## 2018-04-27 ENCOUNTER — APPOINTMENT (OUTPATIENT)
Dept: CT IMAGING | Age: 68
DRG: 563 | End: 2018-04-27
Attending: STUDENT IN AN ORGANIZED HEALTH CARE EDUCATION/TRAINING PROGRAM
Payer: MEDICARE

## 2018-04-27 ENCOUNTER — HOSPITAL ENCOUNTER (OUTPATIENT)
Dept: LAB | Age: 68
Discharge: HOME | DRG: 563 | End: 2018-04-27
Payer: MEDICARE

## 2018-04-27 ENCOUNTER — HOSPITAL ENCOUNTER (INPATIENT)
Age: 68
LOS: 5 days | Discharge: NURSING, REHAB OR LONG-TERM CARE | DRG: 563 | End: 2018-05-02
Attending: EMERGENCY MEDICINE | Admitting: FAMILY MEDICINE
Payer: MEDICARE

## 2018-04-27 ENCOUNTER — HOSPITAL ENCOUNTER (OUTPATIENT)
Dept: INFUSION THERAPY | Age: 68
Discharge: HOME | End: 2018-04-27
Payer: MEDICARE

## 2018-04-27 DIAGNOSIS — Z79.01 LONG TERM (CURRENT) USE OF ANTICOAGULANTS: Chronic | ICD-10-CM

## 2018-04-27 DIAGNOSIS — I10 ESSENTIAL HYPERTENSION, BENIGN: Chronic | ICD-10-CM

## 2018-04-27 DIAGNOSIS — E78.2 MIXED HYPERLIPIDEMIA: Chronic | ICD-10-CM

## 2018-04-27 DIAGNOSIS — D50.0 IRON DEFICIENCY ANEMIA DUE TO CHRONIC BLOOD LOSS: Chronic | ICD-10-CM

## 2018-04-27 DIAGNOSIS — D50.0 IRON DEFICIENCY ANEMIA DUE TO CHRONIC BLOOD LOSS: ICD-10-CM

## 2018-04-27 DIAGNOSIS — S62.357S: ICD-10-CM

## 2018-04-27 DIAGNOSIS — S06.5XAA SUBDURAL HEMATOMA: Primary | ICD-10-CM

## 2018-04-27 DIAGNOSIS — Z95.2 S/P AVR (AORTIC VALVE REPLACEMENT): Chronic | ICD-10-CM

## 2018-04-27 DIAGNOSIS — J44.9 CHRONIC OBSTRUCTIVE PULMONARY DISEASE, UNSPECIFIED COPD TYPE (HCC): Chronic | ICD-10-CM

## 2018-04-27 DIAGNOSIS — F34.1 DYSTHYMIA: ICD-10-CM

## 2018-04-27 DIAGNOSIS — D46.9 MDS (MYELODYSPLASTIC SYNDROME) (HCC): Chronic | ICD-10-CM

## 2018-04-27 DIAGNOSIS — Z79.01 ANTICOAGULATED ON COUMADIN: ICD-10-CM

## 2018-04-27 DIAGNOSIS — I25.10 CORONARY ARTERY DISEASE INVOLVING NATIVE CORONARY ARTERY OF NATIVE HEART WITHOUT ANGINA PECTORIS: Chronic | ICD-10-CM

## 2018-04-27 DIAGNOSIS — D46.9 MDS (MYELODYSPLASTIC SYNDROME) (HCC): ICD-10-CM

## 2018-04-27 DIAGNOSIS — S62.357A CLOSED NONDISPLACED FRACTURE OF SHAFT OF FIFTH METACARPAL BONE OF LEFT HAND, INITIAL ENCOUNTER: ICD-10-CM

## 2018-04-27 DIAGNOSIS — G47.33 OSA (OBSTRUCTIVE SLEEP APNEA): Chronic | ICD-10-CM

## 2018-04-27 DIAGNOSIS — S83.91XA SPRAIN OF RIGHT KNEE, INITIAL ENCOUNTER: ICD-10-CM

## 2018-04-27 DIAGNOSIS — H11.32 SUBCONJUNCTIVAL HEMORRHAGE, LEFT: ICD-10-CM

## 2018-04-27 DIAGNOSIS — N18.30 CKD (CHRONIC KIDNEY DISEASE) STAGE 3, GFR 30-59 ML/MIN (HCC): Chronic | ICD-10-CM

## 2018-04-27 DIAGNOSIS — I27.20 PULMONARY HYPERTENSION (HCC): Chronic | ICD-10-CM

## 2018-04-27 DIAGNOSIS — E11.21 TYPE 2 DIABETES MELLITUS WITH NEPHROPATHY (HCC): Chronic | ICD-10-CM

## 2018-04-27 DIAGNOSIS — E11.9 DIABETES MELLITUS TYPE 2, DIET-CONTROLLED (HCC): Chronic | ICD-10-CM

## 2018-04-27 DIAGNOSIS — Z95.810 ICD (IMPLANTABLE CARDIOVERTER-DEFIBRILLATOR) IN PLACE: ICD-10-CM

## 2018-04-27 DIAGNOSIS — M85.80 OSTEOPENIA, UNSPECIFIED LOCATION: ICD-10-CM

## 2018-04-27 DIAGNOSIS — I42.9 CARDIOMYOPATHY, UNSPECIFIED TYPE (HCC): Chronic | ICD-10-CM

## 2018-04-27 DIAGNOSIS — I50.42 CHRONIC COMBINED SYSTOLIC AND DIASTOLIC HEART FAILURE (HCC): Chronic | ICD-10-CM

## 2018-04-27 DIAGNOSIS — M19.019 OSTEOARTHRITIS OF SHOULDER, UNSPECIFIED LATERALITY, UNSPECIFIED OSTEOARTHRITIS TYPE: ICD-10-CM

## 2018-04-27 PROBLEM — I50.9 CHF (CONGESTIVE HEART FAILURE) (HCC): Status: RESOLVED | Noted: 2017-02-17 | Resolved: 2018-04-27

## 2018-04-27 LAB
ALBUMIN SERPL-MCNC: 3.7 G/DL (ref 3.2–4.6)
ALBUMIN/GLOB SERPL: 1 {RATIO} (ref 1.2–3.5)
ALP SERPL-CCNC: 57 U/L (ref 50–136)
ALT SERPL-CCNC: 37 U/L (ref 12–65)
ANION GAP SERPL CALC-SCNC: 3 MMOL/L (ref 7–16)
AST SERPL-CCNC: 48 U/L (ref 15–37)
BASOPHILS # BLD: 0 K/UL (ref 0–0.2)
BASOPHILS # BLD: 0 K/UL (ref 0–0.2)
BASOPHILS NFR BLD: 0 % (ref 0–2)
BASOPHILS NFR BLD: 0 % (ref 0–2)
BILIRUB SERPL-MCNC: 0.6 MG/DL (ref 0.2–1.1)
BUN SERPL-MCNC: 45 MG/DL (ref 8–23)
CALCIUM SERPL-MCNC: 10.3 MG/DL (ref 8.3–10.4)
CHLORIDE SERPL-SCNC: 96 MMOL/L (ref 98–107)
CO2 SERPL-SCNC: 41 MMOL/L (ref 21–32)
CREAT SERPL-MCNC: 2.03 MG/DL (ref 0.6–1)
DIFFERENTIAL METHOD BLD: ABNORMAL
DIFFERENTIAL METHOD BLD: ABNORMAL
EOSINOPHIL # BLD: 0.2 K/UL (ref 0–0.8)
EOSINOPHIL # BLD: 0.2 K/UL (ref 0–0.8)
EOSINOPHIL NFR BLD: 1 % (ref 0.5–7.8)
EOSINOPHIL NFR BLD: 1 % (ref 0.5–7.8)
ERYTHROCYTE [DISTWIDTH] IN BLOOD BY AUTOMATED COUNT: 13.7 % (ref 11.9–14.6)
ERYTHROCYTE [DISTWIDTH] IN BLOOD BY AUTOMATED COUNT: 14 % (ref 11.9–14.6)
FERRITIN SERPL-MCNC: 31 NG/ML (ref 8–388)
GLOBULIN SER CALC-MCNC: 3.7 G/DL (ref 2.3–3.5)
GLUCOSE SERPL-MCNC: 195 MG/DL (ref 65–100)
HCT VFR BLD AUTO: 26.9 % (ref 35.8–46.3)
HCT VFR BLD AUTO: 27.1 % (ref 35.8–46.3)
HGB BLD-MCNC: 8.4 G/DL (ref 11.7–15.4)
HGB BLD-MCNC: 8.6 G/DL (ref 11.7–15.4)
IMM GRANULOCYTES # BLD: 0 K/UL (ref 0–0.5)
IMM GRANULOCYTES NFR BLD AUTO: 0 % (ref 0–5)
INR PPP: 2.8
IRON SATN MFR SERPL: 17 %
IRON SERPL-MCNC: 51 UG/DL (ref 35–150)
LYMPHOCYTES # BLD: 0.7 K/UL (ref 0.5–4.6)
LYMPHOCYTES # BLD: 1.1 K/UL (ref 0.5–4.6)
LYMPHOCYTES NFR BLD: 6 % (ref 13–44)
LYMPHOCYTES NFR BLD: 9 % (ref 13–44)
MCH RBC QN AUTO: 29.6 PG (ref 26.1–32.9)
MCH RBC QN AUTO: 29.6 PG (ref 26.1–32.9)
MCHC RBC AUTO-ENTMCNC: 31.2 G/DL (ref 31.4–35)
MCHC RBC AUTO-ENTMCNC: 31.7 G/DL (ref 31.4–35)
MCV RBC AUTO: 93.1 FL (ref 79.6–97.8)
MCV RBC AUTO: 94.7 FL (ref 79.6–97.8)
MONOCYTES # BLD: 0.7 K/UL (ref 0.1–1.3)
MONOCYTES # BLD: 0.8 K/UL (ref 0.1–1.3)
MONOCYTES NFR BLD: 6 % (ref 4–12)
MONOCYTES NFR BLD: 6 % (ref 4–12)
NEUTS SEG # BLD: 10.9 K/UL (ref 1.7–8.2)
NEUTS SEG # BLD: 9.5 K/UL (ref 1.7–8.2)
NEUTS SEG NFR BLD: 84 % (ref 43–78)
NEUTS SEG NFR BLD: 87 % (ref 43–78)
NRBC # BLD: 0 K/UL (ref 0–0.2)
PLATELET # BLD AUTO: 191 K/UL (ref 150–450)
PLATELET # BLD AUTO: 248 K/UL (ref 150–450)
PMV BLD AUTO: 10.1 FL (ref 10.8–14.1)
PMV BLD AUTO: 10.9 FL (ref 10.8–14.1)
POTASSIUM SERPL-SCNC: 5.1 MMOL/L (ref 3.5–5.1)
PROT SERPL-MCNC: 7.4 G/DL (ref 6.3–8.2)
PROTHROMBIN TIME: 29.3 SEC (ref 11.5–14.5)
RBC # BLD AUTO: 2.84 M/UL (ref 4.05–5.25)
RBC # BLD AUTO: 2.91 M/UL (ref 4.05–5.25)
SODIUM SERPL-SCNC: 140 MMOL/L (ref 136–145)
TIBC SERPL-MCNC: 304 UG/DL (ref 250–450)
WBC # BLD AUTO: 11.5 K/UL (ref 4.3–11.1)
WBC # BLD AUTO: 12.6 K/UL (ref 4.3–11.1)

## 2018-04-27 PROCEDURE — 73130 X-RAY EXAM OF HAND: CPT

## 2018-04-27 PROCEDURE — 70486 CT MAXILLOFACIAL W/O DYE: CPT

## 2018-04-27 PROCEDURE — 36415 COLL VENOUS BLD VENIPUNCTURE: CPT | Performed by: NURSE PRACTITIONER

## 2018-04-27 PROCEDURE — 85610 PROTHROMBIN TIME: CPT | Performed by: EMERGENCY MEDICINE

## 2018-04-27 PROCEDURE — 86923 COMPATIBILITY TEST ELECTRIC: CPT | Performed by: EMERGENCY MEDICINE

## 2018-04-27 PROCEDURE — 83540 ASSAY OF IRON: CPT | Performed by: NURSE PRACTITIONER

## 2018-04-27 PROCEDURE — 65610000006 HC RM INTENSIVE CARE

## 2018-04-27 PROCEDURE — 74011250636 HC RX REV CODE- 250/636: Performed by: EMERGENCY MEDICINE

## 2018-04-27 PROCEDURE — 74011250636 HC RX REV CODE- 250/636: Performed by: INTERNAL MEDICINE

## 2018-04-27 PROCEDURE — 85025 COMPLETE CBC W/AUTO DIFF WBC: CPT | Performed by: EMERGENCY MEDICINE

## 2018-04-27 PROCEDURE — 80053 COMPREHEN METABOLIC PANEL: CPT | Performed by: EMERGENCY MEDICINE

## 2018-04-27 PROCEDURE — 77030019605

## 2018-04-27 PROCEDURE — 74011250637 HC RX REV CODE- 250/637: Performed by: EMERGENCY MEDICINE

## 2018-04-27 PROCEDURE — P9059 PLASMA, FRZ BETWEEN 8-24HOUR: HCPCS | Performed by: EMERGENCY MEDICINE

## 2018-04-27 PROCEDURE — 86900 BLOOD TYPING SEROLOGIC ABO: CPT | Performed by: EMERGENCY MEDICINE

## 2018-04-27 PROCEDURE — 85025 COMPLETE CBC W/AUTO DIFF WBC: CPT | Performed by: NURSE PRACTITIONER

## 2018-04-27 PROCEDURE — 82728 ASSAY OF FERRITIN: CPT | Performed by: NURSE PRACTITIONER

## 2018-04-27 PROCEDURE — 30233K1 TRANSFUSION OF NONAUTOLOGOUS FROZEN PLASMA INTO PERIPHERAL VEIN, PERCUTANEOUS APPROACH: ICD-10-PCS | Performed by: EMERGENCY MEDICINE

## 2018-04-27 PROCEDURE — 96372 THER/PROPH/DIAG INJ SC/IM: CPT

## 2018-04-27 PROCEDURE — 99284 EMERGENCY DEPT VISIT MOD MDM: CPT | Performed by: EMERGENCY MEDICINE

## 2018-04-27 PROCEDURE — 70450 CT HEAD/BRAIN W/O DYE: CPT

## 2018-04-27 PROCEDURE — 36430 TRANSFUSION BLD/BLD COMPNT: CPT

## 2018-04-27 PROCEDURE — 73562 X-RAY EXAM OF KNEE 3: CPT

## 2018-04-27 RX ORDER — ONDANSETRON 2 MG/ML
4 INJECTION INTRAMUSCULAR; INTRAVENOUS
Status: COMPLETED | OUTPATIENT
Start: 2018-04-27 | End: 2018-04-27

## 2018-04-27 RX ORDER — ACETAMINOPHEN 325 MG/1
650 TABLET ORAL
Status: COMPLETED | OUTPATIENT
Start: 2018-04-27 | End: 2018-04-27

## 2018-04-27 RX ORDER — SODIUM CHLORIDE 9 MG/ML
250 INJECTION, SOLUTION INTRAVENOUS AS NEEDED
Status: DISCONTINUED | OUTPATIENT
Start: 2018-04-27 | End: 2018-05-02 | Stop reason: HOSPADM

## 2018-04-27 RX ORDER — MORPHINE SULFATE 4 MG/ML
2 INJECTION INTRAVENOUS
Status: COMPLETED | OUTPATIENT
Start: 2018-04-27 | End: 2018-04-27

## 2018-04-27 RX ADMIN — ONDANSETRON 4 MG: 2 INJECTION INTRAMUSCULAR; INTRAVENOUS at 22:32

## 2018-04-27 RX ADMIN — ERYTHROPOIETIN 40000 UNITS: 40000 INJECTION, SOLUTION INTRAVENOUS; SUBCUTANEOUS at 16:17

## 2018-04-27 RX ADMIN — MORPHINE SULFATE 2 MG: 4 INJECTION INTRAVENOUS at 22:32

## 2018-04-27 RX ADMIN — ACETAMINOPHEN 650 MG: 325 TABLET ORAL at 20:34

## 2018-04-27 NOTE — ED NOTES
Pt brought in by [facility] for fall after losing balance. Pt takes blood thinners, she did hit her head. Pt has hematomas to face, head, wrist and knees from fall.

## 2018-04-27 NOTE — ED TRIAGE NOTES
Pt fell hitting both knees, right hand and face. Pt wears oxygen all the time at 3 LPM, her oxygen was increased to 4lpm via NC in triage. Pt denies feeling short of breath.

## 2018-04-28 ENCOUNTER — APPOINTMENT (OUTPATIENT)
Dept: CT IMAGING | Age: 68
DRG: 563 | End: 2018-04-28
Attending: EMERGENCY MEDICINE
Payer: MEDICARE

## 2018-04-28 PROBLEM — S62.357A CLOSED NONDISPLACED FRACTURE OF SHAFT OF FIFTH METACARPAL BONE OF LEFT HAND: Status: ACTIVE | Noted: 2018-04-28

## 2018-04-28 LAB
ANION GAP SERPL CALC-SCNC: 5 MMOL/L (ref 7–16)
ATRIAL RATE: 73 BPM
BASOPHILS # BLD: 0 K/UL (ref 0–0.2)
BASOPHILS NFR BLD: 0 % (ref 0–2)
BUN SERPL-MCNC: 49 MG/DL (ref 8–23)
CALCIUM SERPL-MCNC: 9.9 MG/DL (ref 8.3–10.4)
CALCULATED P AXIS, ECG09: 22 DEGREES
CALCULATED R AXIS, ECG10: -56 DEGREES
CALCULATED T AXIS, ECG11: 106 DEGREES
CHLORIDE SERPL-SCNC: 98 MMOL/L (ref 98–107)
CO2 SERPL-SCNC: 39 MMOL/L (ref 21–32)
CREAT SERPL-MCNC: 2.09 MG/DL (ref 0.6–1)
DIAGNOSIS, 93000: NORMAL
DIFFERENTIAL METHOD BLD: ABNORMAL
EOSINOPHIL # BLD: 0.2 K/UL (ref 0–0.8)
EOSINOPHIL NFR BLD: 3 % (ref 0.5–7.8)
ERYTHROCYTE [DISTWIDTH] IN BLOOD BY AUTOMATED COUNT: 14.1 % (ref 11.9–14.6)
GLUCOSE BLD STRIP.AUTO-MCNC: 105 MG/DL (ref 65–100)
GLUCOSE SERPL-MCNC: 90 MG/DL (ref 65–100)
HCT VFR BLD AUTO: 23.8 % (ref 35.8–46.3)
HGB BLD-MCNC: 7.4 G/DL (ref 11.7–15.4)
IMM GRANULOCYTES # BLD: 0 K/UL (ref 0–0.5)
IMM GRANULOCYTES NFR BLD AUTO: 0 % (ref 0–5)
INR PPP: 2.1
LYMPHOCYTES # BLD: 1.6 K/UL (ref 0.5–4.6)
LYMPHOCYTES NFR BLD: 21 % (ref 13–44)
MCH RBC QN AUTO: 28.9 PG (ref 26.1–32.9)
MCHC RBC AUTO-ENTMCNC: 31.1 G/DL (ref 31.4–35)
MCV RBC AUTO: 93 FL (ref 79.6–97.8)
MONOCYTES # BLD: 0.6 K/UL (ref 0.1–1.3)
MONOCYTES NFR BLD: 8 % (ref 4–12)
NEUTS SEG # BLD: 5.1 K/UL (ref 1.7–8.2)
NEUTS SEG NFR BLD: 68 % (ref 43–78)
P-R INTERVAL, ECG05: 188 MS
PLATELET # BLD AUTO: 183 K/UL (ref 150–450)
PMV BLD AUTO: 10.2 FL (ref 10.8–14.1)
POTASSIUM SERPL-SCNC: 4.7 MMOL/L (ref 3.5–5.1)
PROTHROMBIN TIME: 22.8 SEC (ref 11.5–14.5)
Q-T INTERVAL, ECG07: 424 MS
QRS DURATION, ECG06: 124 MS
QTC CALCULATION (BEZET), ECG08: 467 MS
RBC # BLD AUTO: 2.56 M/UL (ref 4.05–5.25)
SODIUM SERPL-SCNC: 142 MMOL/L (ref 136–145)
VENTRICULAR RATE, ECG03: 73 BPM
WBC # BLD AUTO: 7.4 K/UL (ref 4.3–11.1)

## 2018-04-28 PROCEDURE — 74011250636 HC RX REV CODE- 250/636: Performed by: FAMILY MEDICINE

## 2018-04-28 PROCEDURE — 85610 PROTHROMBIN TIME: CPT | Performed by: NURSE PRACTITIONER

## 2018-04-28 PROCEDURE — 65660000000 HC RM CCU STEPDOWN

## 2018-04-28 PROCEDURE — 74011000250 HC RX REV CODE- 250: Performed by: FAMILY MEDICINE

## 2018-04-28 PROCEDURE — 93005 ELECTROCARDIOGRAM TRACING: CPT | Performed by: FAMILY MEDICINE

## 2018-04-28 PROCEDURE — 82962 GLUCOSE BLOOD TEST: CPT

## 2018-04-28 PROCEDURE — 70450 CT HEAD/BRAIN W/O DYE: CPT

## 2018-04-28 PROCEDURE — 36430 TRANSFUSION BLD/BLD COMPNT: CPT

## 2018-04-28 PROCEDURE — 85025 COMPLETE CBC W/AUTO DIFF WBC: CPT | Performed by: FAMILY MEDICINE

## 2018-04-28 PROCEDURE — 74011250637 HC RX REV CODE- 250/637: Performed by: INTERNAL MEDICINE

## 2018-04-28 PROCEDURE — 94640 AIRWAY INHALATION TREATMENT: CPT

## 2018-04-28 PROCEDURE — P9059 PLASMA, FRZ BETWEEN 8-24HOUR: HCPCS | Performed by: EMERGENCY MEDICINE

## 2018-04-28 PROCEDURE — 74011250637 HC RX REV CODE- 250/637: Performed by: HOSPITALIST

## 2018-04-28 PROCEDURE — 77010033678 HC OXYGEN DAILY

## 2018-04-28 PROCEDURE — 74011250637 HC RX REV CODE- 250/637: Performed by: FAMILY MEDICINE

## 2018-04-28 PROCEDURE — 80048 BASIC METABOLIC PNL TOTAL CA: CPT | Performed by: FAMILY MEDICINE

## 2018-04-28 PROCEDURE — 77030032490 HC SLV COMPR SCD KNE COVD -B

## 2018-04-28 PROCEDURE — 94760 N-INVAS EAR/PLS OXIMETRY 1: CPT

## 2018-04-28 PROCEDURE — 36415 COLL VENOUS BLD VENIPUNCTURE: CPT | Performed by: FAMILY MEDICINE

## 2018-04-28 RX ORDER — BISACODYL 5 MG
5 TABLET, DELAYED RELEASE (ENTERIC COATED) ORAL DAILY PRN
Status: DISCONTINUED | OUTPATIENT
Start: 2018-04-28 | End: 2018-05-02 | Stop reason: HOSPADM

## 2018-04-28 RX ORDER — ESCITALOPRAM OXALATE 10 MG/1
10 TABLET ORAL DAILY
Status: DISCONTINUED | OUTPATIENT
Start: 2018-04-28 | End: 2018-05-02 | Stop reason: HOSPADM

## 2018-04-28 RX ORDER — SODIUM CHLORIDE 9 MG/ML
1000 INJECTION, SOLUTION INTRAVENOUS CONTINUOUS
Status: DISCONTINUED | OUTPATIENT
Start: 2018-04-28 | End: 2018-04-28

## 2018-04-28 RX ORDER — HYDROCODONE BITARTRATE AND ACETAMINOPHEN 10; 325 MG/1; MG/1
1 TABLET ORAL
Status: DISCONTINUED | OUTPATIENT
Start: 2018-04-28 | End: 2018-04-28

## 2018-04-28 RX ORDER — FUROSEMIDE 40 MG/1
80 TABLET ORAL 2 TIMES DAILY
Status: DISCONTINUED | OUTPATIENT
Start: 2018-04-28 | End: 2018-05-02 | Stop reason: HOSPADM

## 2018-04-28 RX ORDER — LORATADINE 10 MG/1
10 TABLET ORAL
Status: DISCONTINUED | OUTPATIENT
Start: 2018-04-28 | End: 2018-05-02 | Stop reason: HOSPADM

## 2018-04-28 RX ORDER — CARVEDILOL 6.25 MG/1
6.25 TABLET ORAL 2 TIMES DAILY WITH MEALS
Status: DISCONTINUED | OUTPATIENT
Start: 2018-04-28 | End: 2018-04-28

## 2018-04-28 RX ORDER — CARVEDILOL 3.12 MG/1
3.12 TABLET ORAL 2 TIMES DAILY WITH MEALS
Status: DISCONTINUED | OUTPATIENT
Start: 2018-04-28 | End: 2018-05-02 | Stop reason: HOSPADM

## 2018-04-28 RX ORDER — SODIUM CHLORIDE 0.9 % (FLUSH) 0.9 %
5-10 SYRINGE (ML) INJECTION EVERY 8 HOURS
Status: DISCONTINUED | OUTPATIENT
Start: 2018-04-28 | End: 2018-05-02 | Stop reason: HOSPADM

## 2018-04-28 RX ORDER — NALOXONE HYDROCHLORIDE 0.4 MG/ML
0.4 INJECTION, SOLUTION INTRAMUSCULAR; INTRAVENOUS; SUBCUTANEOUS AS NEEDED
Status: DISCONTINUED | OUTPATIENT
Start: 2018-04-28 | End: 2018-05-02 | Stop reason: HOSPADM

## 2018-04-28 RX ORDER — ALBUTEROL SULFATE 0.83 MG/ML
2.5 SOLUTION RESPIRATORY (INHALATION)
Status: DISCONTINUED | OUTPATIENT
Start: 2018-04-28 | End: 2018-05-02 | Stop reason: HOSPADM

## 2018-04-28 RX ORDER — SIMVASTATIN 20 MG/1
20 TABLET, FILM COATED ORAL
Status: DISCONTINUED | OUTPATIENT
Start: 2018-04-28 | End: 2018-05-02 | Stop reason: HOSPADM

## 2018-04-28 RX ORDER — DIPHENHYDRAMINE HCL 25 MG
25 CAPSULE ORAL
Status: DISCONTINUED | OUTPATIENT
Start: 2018-04-28 | End: 2018-05-02 | Stop reason: HOSPADM

## 2018-04-28 RX ORDER — ACETAMINOPHEN 325 MG/1
650 TABLET ORAL
Status: DISCONTINUED | OUTPATIENT
Start: 2018-04-28 | End: 2018-04-28 | Stop reason: SDUPTHER

## 2018-04-28 RX ORDER — BUDESONIDE 0.5 MG/2ML
500 INHALANT ORAL
Status: DISCONTINUED | OUTPATIENT
Start: 2018-04-28 | End: 2018-05-02 | Stop reason: HOSPADM

## 2018-04-28 RX ORDER — ISOSORBIDE MONONITRATE 30 MG/1
30 TABLET, EXTENDED RELEASE ORAL DAILY
Status: DISCONTINUED | OUTPATIENT
Start: 2018-04-28 | End: 2018-04-28

## 2018-04-28 RX ORDER — SODIUM CHLORIDE 0.9 % (FLUSH) 0.9 %
5-10 SYRINGE (ML) INJECTION AS NEEDED
Status: DISCONTINUED | OUTPATIENT
Start: 2018-04-28 | End: 2018-05-02 | Stop reason: HOSPADM

## 2018-04-28 RX ORDER — PROCHLORPERAZINE EDISYLATE 5 MG/ML
5 INJECTION INTRAMUSCULAR; INTRAVENOUS
Status: DISCONTINUED | OUTPATIENT
Start: 2018-04-28 | End: 2018-05-02 | Stop reason: HOSPADM

## 2018-04-28 RX ORDER — LORAZEPAM 1 MG/1
1 TABLET ORAL
Status: DISCONTINUED | OUTPATIENT
Start: 2018-04-28 | End: 2018-05-02 | Stop reason: HOSPADM

## 2018-04-28 RX ORDER — TRAMADOL HYDROCHLORIDE 50 MG/1
50 TABLET ORAL
Status: DISCONTINUED | OUTPATIENT
Start: 2018-04-28 | End: 2018-05-02 | Stop reason: HOSPADM

## 2018-04-28 RX ORDER — ACETAMINOPHEN 325 MG/1
650 TABLET ORAL
Status: DISCONTINUED | OUTPATIENT
Start: 2018-04-28 | End: 2018-05-02 | Stop reason: HOSPADM

## 2018-04-28 RX ORDER — WARFARIN 2.5 MG/1
5 TABLET ORAL
Status: COMPLETED | OUTPATIENT
Start: 2018-04-28 | End: 2018-04-28

## 2018-04-28 RX ADMIN — Medication 10 ML: at 21:46

## 2018-04-28 RX ADMIN — SIMVASTATIN 20 MG: 20 TABLET, FILM COATED ORAL at 01:43

## 2018-04-28 RX ADMIN — ESCITALOPRAM OXALATE 10 MG: 10 TABLET ORAL at 09:43

## 2018-04-28 RX ADMIN — SODIUM CHLORIDE 1000 ML: 900 INJECTION, SOLUTION INTRAVENOUS at 05:26

## 2018-04-28 RX ADMIN — FUROSEMIDE 80 MG: 40 TABLET ORAL at 11:04

## 2018-04-28 RX ADMIN — SIMVASTATIN 20 MG: 20 TABLET, FILM COATED ORAL at 21:46

## 2018-04-28 RX ADMIN — Medication 10 ML: at 05:31

## 2018-04-28 RX ADMIN — ACETAMINOPHEN 650 MG: 325 TABLET ORAL at 09:43

## 2018-04-28 RX ADMIN — ALBUTEROL SULFATE 2.5 MG: 2.5 SOLUTION RESPIRATORY (INHALATION) at 08:10

## 2018-04-28 RX ADMIN — Medication 10 ML: at 17:00

## 2018-04-28 RX ADMIN — SACUBITRIL AND VALSARTAN 1 TABLET: 24; 26 TABLET, FILM COATED ORAL at 18:27

## 2018-04-28 RX ADMIN — ALBUTEROL SULFATE 2.5 MG: 2.5 SOLUTION RESPIRATORY (INHALATION) at 20:17

## 2018-04-28 RX ADMIN — Medication 10 ML: at 01:43

## 2018-04-28 RX ADMIN — CARVEDILOL 3.12 MG: 3.12 TABLET, FILM COATED ORAL at 17:00

## 2018-04-28 RX ADMIN — ALBUTEROL SULFATE 2.5 MG: 2.5 SOLUTION RESPIRATORY (INHALATION) at 14:31

## 2018-04-28 RX ADMIN — ACETAMINOPHEN 650 MG: 325 TABLET ORAL at 01:44

## 2018-04-28 RX ADMIN — SACUBITRIL AND VALSARTAN 1 TABLET: 24; 26 TABLET, FILM COATED ORAL at 11:04

## 2018-04-28 RX ADMIN — FUROSEMIDE 80 MG: 40 TABLET ORAL at 18:27

## 2018-04-28 RX ADMIN — BUDESONIDE 500 MCG: 0.5 INHALANT RESPIRATORY (INHALATION) at 08:10

## 2018-04-28 RX ADMIN — WARFARIN SODIUM 5 MG: 2.5 TABLET ORAL at 09:43

## 2018-04-28 RX ADMIN — WARFARIN SODIUM 2.5 MG: 2 TABLET ORAL at 18:33

## 2018-04-28 RX ADMIN — BUDESONIDE 500 MCG: 0.5 INHALANT RESPIRATORY (INHALATION) at 20:16

## 2018-04-28 RX ADMIN — TRAMADOL HYDROCHLORIDE 50 MG: 50 TABLET, FILM COATED ORAL at 17:01

## 2018-04-28 NOTE — ED NOTES
TRANSFER - OUT REPORT:    Verbal report given to 1200 Shilo Rasmussen RN (name) on Rodger Corrales  being transferred to [address] [place] [address] (unit) for routine progression of care       Report consisted of patients Situation, Background, Assessment and   Recommendations(SBAR). Information from the following report(s) SBAR, ED Summary, [facility] and Recent Results was reviewed with the receiving nurse. Lines:   Peripheral IV 04/27/18 Left Antecubital (Active)   Site Assessment Clean, dry, & intact 4/27/2018  8:03 PM   Phlebitis Assessment 0 4/27/2018  8:03 PM   Infiltration Assessment 0 4/27/2018  8:03 PM   Dressing Status Clean, dry, & intact 4/27/2018  8:03 PM   Dressing Type Transparent;Tape 4/27/2018  8:03 PM   Hub Color/Line Status Pink;Patent; Flushed 4/27/2018  8:03 PM   Action Taken Blood drawn 4/27/2018  8:03 PM       Peripheral IV 04/27/18 Right Antecubital (Active)   Site Assessment Clean, dry, & intact 4/27/2018 11:25 PM   Phlebitis Assessment 0 4/27/2018 11:25 PM   Infiltration Assessment 0 4/27/2018 11:25 PM   Dressing Status Clean, dry, & intact 4/27/2018 11:25 PM   Dressing Type Transparent 4/27/2018 11:25 PM        Opportunity for questions and clarification was provided.       Patient transported with:   Registered Nurse

## 2018-04-28 NOTE — PROGRESS NOTES
D/w Dr. Ramón Gallegos, hospitalist orders from Dr. Pascale De Jesus, updated on pt to include, labs, orders, Ct, neuro assessment. Dr. Ramón Gallegos will see pt.

## 2018-04-28 NOTE — CONSULTS
[facility] Cardiology Consult                Date of  Admission: 4/27/2018  7:56 PM     Primary Care Physician: Dr. Dayan Licea  Primary Cardiologist: Dr. Janee Calloway  Referring Physician: Dr. Christine Castañeda Physician: Dr. George Blackwell    CC/Reason for consult: Anticoagulation       Vianca Connelly is a 77 y.o. AA female with PMHx of CHRIS, MDS, CAD, CHF, CKD, HTN, COPD, REJI, DM, HLD, Pulm HTN, ICD )placed 2014 - Dr. Sabine Forbes) and mechanical AVR (placed 2005 in [place]) who is seen in the ED after presenting s/p fall. States missed a step and fell at approx 1630 today. Denies any preceding CP, SOB, palpitations or syncope. She landed on her hands and R knee but hit her head as well. CT in ED revealed Small right posterior parietal subdural hematoma. XRs revealed L hand 3rd and 5th finger fractures. She also has a contusion to her L eye as well as a hematoma on her R hand. She is followed in our Coumadin Clinic with goal INR 2.0 - 3.0. She states that on 3/26 her INR was elevated at 4.2 and on 4/5 was 4.1. She states last check was on 4/19 and was down to 2.9. States she was taking  OTC Tylenol PM and feels this elevated her INR. States prior to today has been feeling well. INR in ED 2.8. Neuro Surg wants INR reversed or at least <2.0 per ED MD. 2 units FFP have been ordered by ED.      Patient Active Problem List   Diagnosis Code    Iron deficiency anemia due to chronic blood loss D50.0    MDS (myelodysplastic syndrome) ([facility]) D46.9    CAD (coronary artery disease) I25.10    Chronic combined systolic and diastolic heart failure ([facility]) I50.42    Essential hypertension, benign I10    Anticoagulated on Coumadin Z51.81, Z79.01    CKD (chronic kidney disease) stage 3, GFR 30-59 ml/min N18.3    COPD (chronic obstructive pulmonary disease) ([facility]) J44.9    REJI (obstructive sleep apnea)-cpap G47.33    Diabetes mellitus type 2, diet-controlled ([facility] E11.9    ICD (implantable cardioverter-defibrillator) in place Z95.810    Osteopenia M85.80    HLD (hyperlipidemia) E78.5    Osteoarthritis M19.90    S/P AVR (aortic valve replacement) Z95.2    Cardiomyopathy (HCC) I42.9    Pulmonary hypertension (HCC) I27.20    Type 2 diabetes mellitus with nephropathy (HCC) E11.21    Dysthymia F34.1    Long term (current) use of anticoagulants Z79.01    Subdural hematoma (HCC) I62.00       Past Medical History:   Diagnosis Date    Anticoagulated on Coumadin 7/9/2013    S/P AVR     Benign hypertension     controlled    CAD (coronary artery disease) 1/20/2013 5/8/14 PCI LAD with stent placed     Cardiomyopathy ([address]     CHF (congestive heart failure) ([address] 2/17/2017    Chronic left-sided low back pain without sciatica 6/15/2016    Chronic respiratory failure ([address] 4/2/2014    CKD (chronic kidney disease) stage 3, GFR 30-59 ml/min 7/10/2013    COPD (chronic obstructive pulmonary disease) ([address] 4/2/2014    Essential hypertension, benign 1/20/2013    HLD (hyperlipidemia)     ICD (implantable cardioverter-defibrillator) in place 10/2/2014    B[employer]ronik single-chamber ICD implantation 10/20/14     Iron deficiency anemia due to chronic blood loss 7/29/2009    MDS (myelodysplastic syndrome) ([address] 12/17/2011    Procrit started in August, 2011 12/18/11 Procrit weekly and Iron stores. 5-12 12-13-12 good response to 3 weekly procrit did not need it last time  3-7-13 Pt doing well. Just wanted a \"check-up. \" Responding to Procrit every three weeks.  8-29-13 patient has missed some injections on recently restarted hemoglobin only issue , takes oral iron iron stores each time       REJI (obstructive sleep apnea)-cpap 4/2/2014    Osteoarthritis     Osteopenia     Quadrantanopia     Secondary pulmonary hypertension     Tachycardia     Rapid H/B     Visual complaint 12/14/2015      Past Surgical History:   Procedure Laterality Date    CARDIAC CATHETERIZATION  2009    HX AORTIC VALVE REPLACEMENT  1995, 2005    mechanical valve 2005    HX  SECTION      HX CORONARY STENT PLACEMENT  May, 2014    STEMI with Stent placement.  HX ENDOSCOPY  2009    EGD    HX HEART CATHETERIZATION      HX PACEMAKER  10/2/2014    [employer] ICD    HX TUBAL LIGATION      IR BX BONE MARROW DIAGNOSTIC  2011     Allergies   Allergen Reactions    Sulfa (Sulfonamide Antibiotics) Hives    Aspirin Nausea Only     Cannot take uncoated aspirin      Family History   Problem Relation Age of Onset    Heart Disease Mother      CHF    Hypertension Mother     Kidney Disease Mother     Heart Disease Father      CHF    Lung Disease Father     Diabetes Father     Cancer Father      prostate    Hypertension Father     Heart Attack Father     Heart Disease Maternal Aunt     Diabetes Brother     Coronary Artery Disease Other     Breast Cancer Neg Hx         Current Facility-Administered Medications   Medication Dose Route Frequency    0.9% sodium chloride infusion 250 mL  250 mL IntraVENous PRN     Current Outpatient Prescriptions   Medication Sig    simvastatin (ZOCOR) 20 mg tablet TAKE 1 TABLET BY MOUTH EVERY DAY    escitalopram oxalate (LEXAPRO) 10 mg tablet TAKE 1 TAB BY MOUTH DAILY. INDICATIONS: ANXIETY WITH DEPRESSION    traMADol (ULTRAM) 50 mg tablet TAKE 1 TABLET BY MOUTH EVERY 4 HOURS AS NEEDED    isosorbide mononitrate ER (IMDUR) 30 mg tablet TAKE 1 TAB BY MOUTH EVERY MORNING.  carvedilol (COREG) 6.25 mg tablet TAKE 1 TAB BY MOUTH TWO (2) TIMES DAILY (WITH MEALS). INDICATIONS: HYPERTENSION    furosemide (LASIX) 40 mg tablet Take 2 Tabs by mouth two (2) times a day.  ferrous gluconate 324 mg (38 mg iron) tablet TAKE 1 TAB BY MOUTH DAILY (BEFORE BREAKFAST). INDICATIONS: IRON DEFICIENCY ANEMIA (Patient taking differently: TAKE 1 TAB BY MOUTH DAILY (BEFORE BREAKFAST). INDICATIONS: IRON DEFICIENCY ANEMIA  Takes twice a day)    fluticasone (FLONASE) 50 mcg/actuation nasal spray 2 Sprays by Both Nostrils route daily as needed.     umeclidinium (INCRUSE ELLIPTA) 62.5 mcg/actuation inhaler Take 1 Puff by inhalation daily. Indications: j44.9    warfarin (COUMADIN) 5 mg tablet Take 1 Tab by mouth daily.  sacubitril-valsartan (ENTRESTO) 24 mg/26 mg tablet Take 1 Tab by mouth two (2) times a day. INDICATIONS: CHRONIC HEART FAILURE    loratadine (CLARITIN) 10 mg tablet TAKE 1 TAB BY MOUTH DAILY. (Patient taking differently: TAKE 1 TAB BY MOUTH DAILY PRN)    mometasone-formoterol (DULERA) 200-5 mcg/actuation HFA inhaler 2 puffs bid, rinse mouth after use.  albuterol (PROVENTIL VENTOLIN) 2.5 mg /3 mL (0.083 %) nebulizer solution 3 mL by Nebulization route every four (4) hours as needed for Wheezing.  albuterol (VENTOLIN HFA) 90 mcg/actuation inhaler 2 puffs qid prn    cholecalciferol, VITAMIN D3, (VITAMIN D3) 5,000 unit tab tablet Take 1 Tab by mouth daily.  acetaminophen (TYLENOL) 325 mg tablet Take 650 mg by mouth every four (4) hours as needed for Pain.  OXYGEN-AIR DELIVERY SYSTEMS 3 L by Nasal route continuous.  nitroglycerin (NITROSTAT) 0.4 mg SL tablet 0.4 mg by SubLINGual route every five (5) minutes as needed.  aspirin delayed-release (ASPIR-81) 81 mg tablet Take 81 mg by mouth every morning. Review of Systems   Constitutional: Negative. HENT: Negative. Eyes: Negative. Respiratory: Negative. Cardiovascular: Negative. Negative for chest pain, palpitations, leg swelling and PND. Gastrointestinal: Negative. Genitourinary: Negative. Musculoskeletal: Negative. Skin: Negative. Neurological: Negative. Negative for dizziness and headaches. Endo/Heme/Allergies: Bruises/bleeds easily. Psychiatric/Behavioral: Negative.          Physical Exam  Vitals:    04/27/18 2007 04/27/18 2156 04/27/18 2226 04/27/18 2232   BP: 134/63 119/59 119/53    Pulse:       Resp:       Temp:       SpO2: 99% 99% 98% 98%   Weight:       Height:           Physical Exam:  Physical Exam   Constitutional: She is oriented to person, place, and time and well-developed, well-nourished, and in no distress. HENT:   Nose: Nose normal.   Mouth/Throat: Oropharynx is clear and moist.   Eyes: EOM are normal. Pupils are equal, round, and reactive to light. No scleral icterus. L eye with contusion/hematoma and bloody sclera   Neck: Normal range of motion. Neck supple. No JVD present. No tracheal deviation present. Cardiovascular: Normal rate, regular rhythm, S1 normal, S2 normal and intact distal pulses. Murmur heard. +valve click   Pulmonary/Chest: Effort normal and breath sounds normal. No stridor. No respiratory distress. She has no wheezes. She has no rales. Abdominal: Soft. Bowel sounds are normal. She exhibits no distension. There is no tenderness. Musculoskeletal:   R knee sore, R hand with large hematoma, L hand with bruising and fractures   Neurological: She is alert and oriented to person, place, and time. No cranial nerve deficit. Skin: Skin is warm and dry. Psychiatric: Mood, memory, affect and judgment normal.       Cardiographics    Telemetry: SR 70s  ECG: none available this admit  Echocardiogram: Last 12/2017: LV with mild concentric hypertrophy, severe global hypokineses, EF 25-30%, ROXANE, norm fx AVR    Labs:   Recent Labs      04/27/18 2002 04/27/18 2001 04/27/18   1510   NA  140   --    --    K  5.1   --    --    BUN  45*   --    --    CREA  2.03*   --    --    GLU  195*   --    --    WBC  11.5*   --   12.6*   HGB  8.6*   --   8.4*   HCT  27.1*   --   26.9*   PLT  248   --   191   INR   --   2.8   --         Assessment/Plan:     Assessment:      Principal Problem:    Subdural hematoma -- management as per Hospitalist and Neuro Surgery    Active Problems:    Anticoagulated on Coumadin -- s/p mechanical AVR 2005 -- now with subdural hematoma s/p fall. Current INR 2.8. Neuro Surg wants INR < 2.0. 2 units FFP ordered by ED MD. With mechanical AVR would use caution with correction and correct slowly.  IF has acute intracranial emergency would recommend FFP over Vit K. Monitor daily INRs. MDS (myelodysplastic syndrome) -- defer to primary          Chronic combined systolic and diastolic heart failure -- cont home management (BB, Entresto, Lasix)      Essential hypertension, benign -- cont home meds, monitor and adjust as indicated      CKD (chronic kidney disease) stage 3, GFR 30-59 ml/min -- Cr up  At 2.03 (1.32 3/2018) -- monitor daily labs, watch I&O      COPD (chronic obstructive pulmonary disease) -- home inhaler use -- defer to primary team      REJI (obstructive sleep apnea) -- uses cpap      HLD (hyperlipidemia) -- cont statin therapy      S/P AVR (aortic valve replacement) -- placed 2005 in [facility] -- last EF 25-30%, cont home meds      Pulmonary hypertension -- defer to primary team      Type 2 diabetes mellitus with nephropathy -- defer to primary team      Long term (current) use of anticoagulants -- on Coumadin for mechanical AVR, goal INR 2.0 - 3.0          Thank you very much for this referral. We appreciate the opportunity to participate in this patient's care. We will follow along with above stated plan.     Alf Marques NP  Consulting MD: Dr. Darlene Marin

## 2018-04-28 NOTE — PROGRESS NOTES
LEAPFROG PROTOCOL NOTE    Natalyaly Rach Door  4/28/2018    The patient is currently in the critical care setting managed by Dr. Chucho Wright with fall at home in which she hit her head with concern for subdural hematoma (on coumadin for mechanical AV). Initially given FFP to reverse anticoagulation. Follow up imaging negative for subdural hematoma and coumadin resumed. She is followed by [facility] as an outpatient for COPD with chronic hypoxic and hypercapnic respiratory failure maintained on Triology/O2 with sleep and O2 at 3 lpm during the day. She is currently stable on her home O2 regimen. BP initially low but is now stable. The patient's chart is reviewed and the patient is discussed with the staff. Patient is currently hemodynamically stable. Patient has no needs identified for Intensivist management in the critical care setting at this time. Please notify us if can be of assistance. No charge billed to the patient. Thank you.     Alberto Go NP    Agree with above    Rachel Tristan MD

## 2018-04-28 NOTE — PROGRESS NOTES
Patient arrived to unit via stretcher on 4L NC. Patient is alert and oriented x4, pupils are equal, round, and reactive to light. No facial droop is noted, tongue is midline. Patient has sensation in and is able to move all four extremities. Moderate  in right hand, patient is unable to  with left hand due to fracture and brace, but can wiggle all fingers. Patient is able to lift BLE and dorsiflexion and plantar flexion are present in BLE. Breath sounds are clear and equal bilaterally. S1 and S2 heart sounds auscultated. ICD is noted in the left chest. Abdomen is intact and soft with active bowel sounds. Right radial pulse is palpable, left brachial pulse is palpable, BLE pedal pulses are palpable. Dual skin assessment completed with Adrian Schmitz RN. Scattered bruising is noted. Patient has large swollen bruise around left eye with a reddened sclera. Patient states vision is unimpaired. A hematoma is present on the right hand. Ulnar gutter splint in place on left hand and wrist on arrival to unit due to 5th metacarpal and 3rd phalanx fractures. Some swelling noted to right knee. Small scaly patches are present on BLE. Sacrum is intact, prophylactic Allevyn is applied.

## 2018-04-28 NOTE — PROGRESS NOTES
Patient seen and examined by me. Agree with above note by physician extender.   Key findings are:  No CP or PRUETT- fall - no syncope or dizziness- stumble- no loc- small sdh and nsg recommends inr less than 2  CV- RRR with 2/6 jorden clicks  Lungs- Clear bilaterally  Ext- no edema    Plan: Mechanical av- difficult situation- discussed risks of reversing inr and valve thrombosis- would use ffp and monitor inr closely- will follow with you

## 2018-04-28 NOTE — ED NOTES
Ulnar gutter splint has been applied to patient's left arm by mireille Yee. Patient tolerated procedure well.

## 2018-04-28 NOTE — ED NOTES
Dr. Geneva Archuleta at bedside, aware of patient's bp. No orders given. Patient alert and oriented x4.

## 2018-04-28 NOTE — CONSULTS
Surgery Consult    Subjective:      Vianca Polanco is a 77 y.o. female who presents after a fall, striking the left side of her face. She denies LOC, denies significant headache at this time. Intitial CT head revealed possible thin SDH, repeat head CT was negative suggesting artifact. Past Medical History:   Diagnosis Date    Anticoagulated on Coumadin 2013    S/P AVR     Benign hypertension     controlled    CAD (coronary artery disease) 2013 PCI LAD with stent placed     Cardiomyopathy ([address]     CHF (congestive heart failure) ([address] 2017    Chronic left-sided low back pain without sciatica 6/15/2016    Chronic respiratory failure ([address] 2014    CKD (chronic kidney disease) stage 3, GFR 30-59 ml/min 7/10/2013    COPD (chronic obstructive pulmonary disease) ([address] 2014    Essential hypertension, benign 2013    HLD (hyperlipidemia)     ICD (implantable cardioverter-defibrillator) in place 10/2/2014    Biotronik single-chamber ICD implantation 10/20/14     Iron deficiency anemia due to chronic blood loss 2009    MDS (myelodysplastic syndrome) ([address] 2011    Procrit started in 11 Procrit weekly and Iron stores. 12 good response to 3 weekly procrit did not need it last time  3--13 Pt doing well. Just wanted a \"check-up. \" Responding to Procrit every three weeks.  13 patient has missed some injections on recently restarted hemoglobin only issue , takes oral iron iron stores each time       REJI (obstructive sleep apnea)-cpap 2014    Osteoarthritis     Osteopenia     Quadrantanopia     Secondary pulmonary hypertension     Tachycardia     Rapid H/B     Visual complaint 2015     Past Surgical History:   Procedure Laterality Date    CARDIAC CATHETERIZATION      HX AORTIC VALVE REPLACEMENT  ,     mechanical valve     HX  SECTION      HX CORONARY STENT PLACEMENT  May, 2014 STEMI with Stent placement.  HX ENDOSCOPY  7/2009    EGD    HX HEART CATHETERIZATION  2013    HX PACEMAKER  10/2/2014    Biotronik ICD    HX TUBAL LIGATION      IR BX BONE MARROW DIAGNOSTIC  7/2011      Family History   Problem Relation Age of Onset    Heart Disease Mother      CHF    Hypertension Mother     Kidney Disease Mother     Heart Disease Father      CHF    Lung Disease Father     Diabetes Father     Cancer Father      prostate    Hypertension Father     Heart Attack Father     Heart Disease Maternal Aunt     Diabetes Brother     Coronary Artery Disease Other     Breast Cancer Neg Hx      Social History     Social History    Marital status:      Spouse name: N/A    Number of children: N/A    Years of education: N/A     Occupational History     Retired     deli     Social History Main Topics    Smoking status: Former Smoker     Packs/day: 0.25     Years: 42.00     Types: Cigarettes     Start date: 10/1/1956     Quit date: 5/1/2014    Smokeless tobacco: Never Used    Alcohol use No    Drug use: No    Sexual activity: Not Asked     Other Topics Concern    Weight Concern Yes    Special Diet Yes     Social History Narrative    Lives with her daughter. Ambulates only short distances.       Current Facility-Administered Medications   Medication Dose Route Frequency Provider Last Rate Last Dose    albuterol (PROVENTIL VENTOLIN) nebulizer solution 2.5 mg  2.5 mg Nebulization Q4H PRN Sarah Adams MD        escitalopram oxalate (LEXAPRO) tablet 10 mg  10 mg Oral DAILY Sarah Adams MD   10 mg at 04/28/18 4151    furosemide (LASIX) tablet 80 mg  80 mg Oral BID Sarah Adams MD   80 mg at 04/28/18 1104    loratadine (CLARITIN) tablet 10 mg  10 mg Oral DAILY PRN Sarah Adams MD        sacubitril-valsartan (ENTRESTO) 24-26 mg tablet 1 Tab  1 Tab Oral BID Sarah Adams MD   1 Tab at 04/28/18 1104    simvastatin (ZOCOR) tablet 20 mg  20 mg Oral QHS Lorence Bumpers, MD   20 mg at 04/28/18 0143    tiotropium [facility]) inhalation capsule 18 mcg  1 Cap Inhalation DAILY Lorence Bumpers, MD   Stopped at 04/28/18 0900    sodium chloride (NS) flush 5-10 mL  5-10 mL IntraVENous Q8H Lorence Bumpers, MD   10 mL at 04/28/18 1700    sodium chloride (NS) flush 5-10 mL  5-10 mL IntraVENous PRN Lorence Bumpers, MD        acetaminophen (TYLENOL) tablet 650 mg  650 mg Oral Q4H PRN Lorence Bumpers, MD   650 mg at 04/28/18 9727    diphenhydrAMINE (BENADRYL) capsule 25 mg  25 mg Oral Q4H PRN Lorence Bumpers, MD        prochlorperazine (COMPAZINE) injection 5 mg  5 mg IntraVENous Q8H PRN Lorence Bumpers, MD        bisacodyl (DULCOLAX) tablet 5 mg  5 mg Oral DAILY PRN Lorence Bumpers, MD        LORazepam (ATIVAN) tablet 1 mg  1 mg Oral BID PRN Lorence Bumpers, MD        naloxone [facility]) injection 0.4 mg  0.4 mg IntraVENous PRN Lorence Bumpers, MD        budesonide (PULMICORT) 500 mcg/2 ml nebulizer suspension  500 mcg Nebulization BID RT Lorence Bumpers, MD   500 mcg at 04/28/18 0810    And    albuterol (PROVENTIL VENTOLIN) nebulizer solution 2.5 mg  2.5 mg Nebulization Q6HWA RT Lorence Bumpers, MD   2.5 mg at 04/28/18 1431    carvedilol (COREG) tablet 3.125 mg  3.125 mg Oral BID WITH MEALS Trenton Mac MD   3.125 mg at 04/28/18 1700    warfarin (COUMADIN) tablet 2.5 mg  2.5 mg Oral ONCE Sujata Ramirez MD        traMADol Tilmon Dynes) tablet 50 mg  50 mg Oral Q6H PRN Sujata Ramirez MD   50 mg at 04/28/18 1701    0.9% sodium chloride infusion 250 mL  250 mL IntraVENous PRN Erick Vanessa MD            Allergies   Allergen Reactions    Sulfa (Sulfonamide Antibiotics) Hives    Aspirin Nausea Only     Cannot take uncoated aspirin       Review of Systems:  Per hpi otherwise negative    Objective:      Patient Vitals for the past 8 hrs:   BP Pulse Resp SpO2   04/28/18 1700 115/63 72 - -   04/28/18 1622 - 65 18 100 %   18 1436 - - - 99 %   18 1402 105/49 68 17 98 %   18 1358 99/42 68 17 100 %   18 1347 93/45 70 12 98 %   18 1332 97/46 - - 99 %   18 1331 - 71 24 -   18 1320 - 69 17 100 %   18 1318 98/47 - - 100 %   18 1302 113/57 - - -   18 1301 - 68 14 100 %   18 1247 115/58 64 16 100 %   18 1232 107/59 73 16 100 %   18 1217 101/60 70 20 95 %   18 1202 106/53 70 18 91 %   18 1147 97/46 69 16 98 %   18 1133 108/54 68 16 99 %   18 1117 108/55 68 15 99 %   18 1113 - 66 22 99 %   18 1104 111/48 73 - -   18 1102 111/48 68 13 99 %   18 1050 106/42 74 19 94 %   18 1049 96/40 82 24 93 %   18 1032 97/51 69 13 98 %   18 1017 100/43 69 13 99 %   18 1002 111/45 70 17 99 %   18 0948 98/55 73 10 98 %   18 0932 106/53 68 15 97 %   18 0924 116/68 70 15 99 %       Temp (24hrs), Av °F (36.7 °C), Min:97.7 °F (36.5 °C), Max:98.7 °F (37.1 °C)      Physical Exam:  AAO x 3  Pleasant and appropriate  L periorbital edema, L conjunctival hemorrhage  EOMI  No drift  Speech clear  No drift    Repeat CT head: no intracranial pathology    Assessment:     Hospital Problems  Date Reviewed: 2018          Codes Class Noted POA    Closed nondisplaced fracture of shaft of fifth metacarpal bone of left hand ICD-10-CM: S62.357A  ICD-9-CM: 815.03  2018 Yes        * (Principal)Subdural hematoma (HCC) ICD-10-CM: I62.00  ICD-9-CM: 432.1  2018 Yes        Long term (current) use of anticoagulants (Chronic) ICD-10-CM: Z79.01  ICD-9-CM: V58.61  2018 Yes        Type 2 diabetes mellitus with nephropathy (HCC) (Chronic) ICD-10-CM: E11.21  ICD-9-CM: 250.40, 583.81  2017 Yes        Pulmonary hypertension (HCC) (Chronic) ICD-10-CM: I27.20  ICD-9-CM: 416.8  6/15/2016 Yes        S/P AVR (aortic valve replacement) (Chronic) ICD-10-CM: Z95.2  ICD-9-CM: V43.3  Unknown Yes Overview Signed 2/23/2016 11:04 AM by Magaly Cantrell     Mechanical/Artific             Cardiomyopathy [facility]) (Chronic) ICD-10-CM: I42.9  ICD-9-CM: 425.4  Unknown Yes        HLD (hyperlipidemia) (Chronic) ICD-10-CM: E78.5  ICD-9-CM: 272.4  Unknown Yes        COPD (chronic obstructive pulmonary disease) ([facility] (Chronic) ICD-10-CM: J44.9  ICD-9-CM: 274  4/2/2014 Yes        REJI (obstructive sleep apnea)-cpap (Chronic) ICD-10-CM: G47.33  ICD-9-CM: 327.23  4/2/2014 Yes        CKD (chronic kidney disease) stage 3, GFR 30-59 ml/min (Chronic) ICD-10-CM: N18.3  ICD-9-CM: 585.3  7/10/2013 Yes        Anticoagulated on Coumadin (Chronic) ICD-10-CM: Z51.81, Z79.01  ICD-9-CM: V58.83, V58.61  7/9/2013 Yes    Overview Signed 7/9/2013  4:16 PM by Fer Samuels NP     S/P AVR             Chronic combined systolic and diastolic heart failure (HCC) (Chronic) ICD-10-CM: I50.42  ICD-9-CM: 428.42  1/20/2013 Yes    Overview Addendum 4/28/2018  4:53 AM by Kvng Palacios MD     5/8/14 ECHO:  EF 10-15%  12/2017:  EF 25-30%             Essential hypertension, benign (Chronic) ICD-10-CM: I10  ICD-9-CM: 401.1  1/20/2013 Yes              Plan:     - Given the absence of blood on repeat CT, ok with resuming AC for her mechanical valve  - No need for planned repeat CT or neurosurgical involvement , will sign off  -     Signed By: Thelma Mcmullen MD     April 28, 2018

## 2018-04-28 NOTE — PROGRESS NOTES
Pt arrived to room 808 via bed from ICU. Pt alert oriented times 3 at this time. Pt denies pain to hands at this time. Pt previously medicated. Pt has hematoma to left eye, bruising to left thigh, scarred are to both shins, splint cast to left wrist and hand. Pt on 3L NC at this time. Edvin medina in place. SCDs in place. Pt skin assessed with Jannetta Koyanagi, RN. Pt oriented to room and surroundings. Pt encouraged to call for assistance if needed call light in reach, door open will monitor.

## 2018-04-28 NOTE — PROGRESS NOTES
TRANSFER - OUT REPORT:    Verbal report given to DEWAYNE Fuchs on [employer]  being transferred to [facility] [phone] for routine progression of care       Report consisted of patients Situation, Background, Assessment and   Recommendations(SBAR). Information from the following report(s) SBAR, Kardex, ED Summary, Procedure Summary, Intake/Output, MAR, Recent Results and Cardiac Rhythm SR ICD was reviewed with the receiving nurse. Lines:   Peripheral IV 04/27/18 Left Antecubital (Active)   Site Assessment Clean, dry, & intact 4/28/2018  7:02 AM   Phlebitis Assessment 0 4/28/2018  7:02 AM   Infiltration Assessment 0 4/28/2018  7:02 AM   Dressing Status Clean, dry, & intact 4/28/2018  7:02 AM   Dressing Type Transparent;Tape 4/28/2018  7:02 AM   Hub Color/Line Status Pink;Flushed;Patent 4/28/2018  7:02 AM   Action Taken Blood drawn 4/27/2018  8:03 PM   Alcohol Cap Used No 4/28/2018  7:02 AM       Peripheral IV 04/27/18 Right Antecubital (Active)   Site Assessment Clean, dry, & intact 4/28/2018  7:02 AM   Phlebitis Assessment 0 4/28/2018  7:02 AM   Infiltration Assessment 0 4/28/2018  7:02 AM   Dressing Status Clean, dry, & intact 4/28/2018  7:02 AM   Dressing Type Transparent;Tape 4/28/2018  7:02 AM   Hub Color/Line Status Pink;Flushed;Patent 4/28/2018  7:02 AM   Alcohol Cap Used No 4/28/2018  7:02 AM        Opportunity for questions and clarification was provided. Patient transported with:   Monitor  O2 @ 3 liters   All personal items from room to include home trilogy.

## 2018-04-28 NOTE — PROGRESS NOTES
TRANSFER - IN REPORT:    Verbal report received from [employer] on [employer]  being received from ICU for routine progression of care      Report consisted of patients Situation, Background, Assessment and   Recommendations(SBAR). Information from the following report(s) SBAR and Recent Results was reviewed with the receiving nurse. Opportunity for questions and clarification was provided. Assessment completed upon patients arrival to unit and care assumed.

## 2018-04-28 NOTE — PROGRESS NOTES
Problem: Falls - Risk of  Goal: *Absence of Falls  Document Tia Fall Risk and appropriate interventions in the flowsheet. Outcome: Progressing Towards Goal  Fall Risk Interventions:  Mobility Interventions: Communicate number of staff needed for ambulation/transfer, OT consult for ADLs, Patient to call before getting OOB, PT Consult for mobility concerns, Strengthening exercises (ROM-active/passive)         Medication Interventions: Evaluate medications/consider consulting pharmacy, Patient to call before getting OOB, Teach patient to arise slowly    Elimination Interventions: Call light in reach, Elevated toilet seat, Patient to call for help with toileting needs, Toileting schedule/hourly rounds    History of Falls Interventions: Consult care management for discharge planning, Door open when patient unattended, Evaluate medications/consider consulting pharmacy, Room close to nurse's station        Problem: Pressure Injury - Risk of  Goal: *Prevention of pressure injury  Document Bahman Scale and appropriate interventions in the flowsheet. Outcome: Progressing Towards Goal  Pressure Injury Interventions: Activity Interventions: Increase time out of bed, Pressure redistribution bed/mattress(bed type), PT/OT evaluation    Mobility Interventions: HOB 30 degrees or less, Pressure redistribution bed/mattress (bed type), PT/OT evaluation, Turn and reposition approx.  every two hours(pillow and wedges)    Nutrition Interventions: Document food/fluid/supplement intake, Offer support with meals,snacks and hydration

## 2018-04-28 NOTE — PROGRESS NOTES
D/w Dr. Kimberly Nava, neurosurgeon, updated on pts assessment, CT, lab. RBTVO continue diet, resume coumadin, no repeat CT's. Will notify hospitalist.  Updated family.

## 2018-04-28 NOTE — PROGRESS NOTES
Bedside rounding with Dr. Kimberly Lorenzana, cardiology. Updated on pt to include bp, Dr. Ethan Wills orders. RBVO to start coumadin back 5mg, first dose now, d/c ivf.

## 2018-04-28 NOTE — PROGRESS NOTES
Bedside shift change report given to Seymour Kawasaki (oncoming nurse) by Guadalupe Altamirano RN (offgoing nurse). Report included the following information SBAR, Kardex, ED Summary, Procedure Summary, Intake/Output, MAR, Recent Results and Cardiac Rhythm SR with PVCs.
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
## 2018-04-28 NOTE — H&P
HOSPITALIST H&P/CONSULT  NAME:  Desiree Yang   Age:  77 y.o.  :   1950   MRN:   [id number]  PCP: Ricarda Kirk MD  Treatment Team: Attending Provider: Elizabeth Junior MD; Attending Provider: Dickson Cool MD    Full Code     CC: Reason for admission is: subdural hematoma on coumadin    HPI:   Patient history was obtained from the ER provider prior to seeing the patient. Patient is a 77 y.o. female who presents to the ER after a fall at home in which she hit her head. Denies syncope or LOC. She states that her foot just got caught on a step. She hit the left side of her head and does have a large bruise around left eye. Has pain in hands and knees. Reports nausea, no vomiting, and a headache. She is on coumadin with an INR of 2 for mechanical valve. Head CT shows a small subdural hematoma. ER discussed with cardiology and neurosurgery; they have decided to reverse anti-coagulation to less than 2 with FFP to minimize subdural bleeding. Recommend repeat CT in about 6 hours. xrays also show nondisplaced fracture of shaft of fifth metacarpal bone of left hand. Splint has been applied. ROS:  All systems have been reviewed and are negative except as stated in HPI or elsewhere.       Past Medical History:   Diagnosis Date    Anticoagulated on Coumadin 2013    S/P AVR     Benign hypertension     controlled    CAD (coronary artery disease) 2013 PCI LAD with stent placed     Cardiomyopathy ([address]     CHF (congestive heart failure) ([address] 2017    Chronic left-sided low back pain without sciatica 6/15/2016    Chronic respiratory failure ([address] 2014    CKD (chronic kidney disease) stage 3, GFR 30-59 ml/min 7/10/2013    COPD (chronic obstructive pulmonary disease) ([address] 2014    Essential hypertension, benign 2013    HLD (hyperlipidemia)     ICD (implantable cardioverter-defibrillator) in place 10/2/2014    Echelon single-chamber ICD implantation 10/20/14     Iron deficiency anemia due to chronic blood loss 2009    MDS (myelodysplastic syndrome) ([facility] 2011    Procrit started in 11 Procrit weekly and Iron stores. 12 good response to 3 weekly procrit did not need it last time  3-7-13 Pt doing well. Just wanted a \"check-up. \" Responding to Procrit every three weeks. 13 patient has missed some injections on recently restarted hemoglobin only issue , takes oral iron iron stores each time       REJI (obstructive sleep apnea)-cpap 2014    Osteoarthritis     Osteopenia     Quadrantanopia     Secondary pulmonary hypertension     Tachycardia     Rapid H/B     Visual complaint 2015      Past Surgical History:   Procedure Laterality Date    CARDIAC CATHETERIZATION      HX AORTIC VALVE REPLACEMENT  ,     mechanical valve     HX  SECTION      HX CORONARY STENT PLACEMENT  May, 2014    STEMI with Stent placement.     HX ENDOSCOPY  2009    EGD    HX HEART CATHETERIZATION      HX PACEMAKER  10/2/2014    [employer] ICD    HX TUBAL LIGATION      IR BX BONE MARROW DIAGNOSTIC  2011      Social History   Substance Use Topics    Smoking status: Former Smoker     Packs/day: 0.25     Years: 42.00     Types: Cigarettes     Start date: 10/1/1956     Quit date: 2014    Smokeless tobacco: Never Used    Alcohol use No      Family History   Problem Relation Age of Onset    Heart Disease Mother      CHF    Hypertension Mother     Kidney Disease Mother     Heart Disease Father      CHF    Lung Disease Father     Diabetes Father     Cancer Father      prostate    Hypertension Father     Heart Attack Father     Heart Disease Maternal Aunt     Diabetes Brother     Coronary Artery Disease Other     Breast Cancer Neg Hx        FH Reviewed and non-contributory to admitting diagnosis    Allergies   Allergen Reactions    Sulfa (Sulfonamide Antibiotics) Hives    Aspirin Nausea Only     Cannot take uncoated aspirin      Prior to Admission Medications   Prescriptions Last Dose Informant Patient Reported? Taking? OXYGEN-AIR DELIVERY SYSTEMS   Yes No   Sig: 3 L by Nasal route continuous. acetaminophen (TYLENOL) 325 mg tablet   Yes No   Sig: Take 650 mg by mouth every four (4) hours as needed for Pain. albuterol (PROVENTIL VENTOLIN) 2.5 mg /3 mL (0.083 %) nebulizer solution   No No   Sig: 3 mL by Nebulization route every four (4) hours as needed for Wheezing. albuterol (VENTOLIN HFA) 90 mcg/actuation inhaler   No No   Si puffs qid prn   aspirin delayed-release (ASPIR-81) 81 mg tablet  Self Yes No   Sig: Take 81 mg by mouth every morning. carvedilol (COREG) 6.25 mg tablet   No No   Sig: TAKE 1 TAB BY MOUTH TWO (2) TIMES DAILY (WITH MEALS). INDICATIONS: HYPERTENSION   cholecalciferol, VITAMIN D3, (VITAMIN D3) 5,000 unit tab tablet   No No   Sig: Take 1 Tab by mouth daily. escitalopram oxalate (LEXAPRO) 10 mg tablet   No No   Sig: TAKE 1 TAB BY MOUTH DAILY. INDICATIONS: ANXIETY WITH DEPRESSION   ferrous gluconate 324 mg (38 mg iron) tablet   No No   Sig: TAKE 1 TAB BY MOUTH DAILY (BEFORE BREAKFAST). INDICATIONS: IRON DEFICIENCY ANEMIA   Patient taking differently: TAKE 1 TAB BY MOUTH DAILY (BEFORE BREAKFAST). INDICATIONS: IRON DEFICIENCY ANEMIA  Takes twice a day   fluticasone (FLONASE) 50 mcg/actuation nasal spray   No No   Si Sprays by Both Nostrils route daily as needed. furosemide (LASIX) 40 mg tablet   No No   Sig: Take 2 Tabs by mouth two (2) times a day. isosorbide mononitrate ER (IMDUR) 30 mg tablet   No No   Sig: TAKE 1 TAB BY MOUTH EVERY MORNING.   loratadine (CLARITIN) 10 mg tablet   No No   Sig: TAKE 1 TAB BY MOUTH DAILY. Patient taking differently: TAKE 1 TAB BY MOUTH DAILY PRN   mometasone-formoterol (DULERA) 200-5 mcg/actuation HFA inhaler   No No   Si puffs bid, rinse mouth after use.    nitroglycerin (NITROSTAT) 0.4 mg SL tablet  Self Yes No   Si.4 mg by SubLINGual route every five (5) minutes as needed. sacubitril-valsartan (ENTRESTO) 24 mg/26 mg tablet   No No   Sig: Take 1 Tab by mouth two (2) times a day. INDICATIONS: CHRONIC HEART FAILURE   simvastatin (ZOCOR) 20 mg tablet   No No   Sig: TAKE 1 TABLET BY MOUTH EVERY DAY   traMADol (ULTRAM) 50 mg tablet   No No   Sig: TAKE 1 TABLET BY MOUTH EVERY 4 HOURS AS NEEDED   umeclidinium (INCRUSE ELLIPTA) 62.5 mcg/actuation inhaler   No No   Sig: Take 1 Puff by inhalation daily. Indications: j44.9   warfarin (COUMADIN) 5 mg tablet   No No   Sig: Take 1 Tab by mouth daily.       Facility-Administered Medications: None         Objective:     Patient Vitals for the past 24 hrs:   Temp Pulse Resp BP SpO2   18 0431 - 70 - 97/47 93 %   18 0418 - 74 30 90/42 92 %   18 0407 - 70 18 93/54 92 %   18 0347 - 70 15 100/44 96 %   18 0332 - 75 - 100/50 95 %   18 0323 - 70 15 97/47 100 %   18 0316 - 72 15 (!) 83/41 98 %   18 0309 98 °F (36.7 °C) 74 18 (!) 81/40 100 %   18 0300 - 71 21 106/43 100 %   18 0250 98.1 °F (36.7 °C) 72 14 (!) 85/45 100 %   18 0148 - 73 17 90/61 100 %   18 0132 - 73 18 90/47 100 %   18 0111 - 73 17 (!) 84/44 99 %   18 0051 98.2 °F (36.8 °C) 76 19 118/74 99 %   18 0007 - - - (!) 86/45 99 %   18 0005 - - - 92/45 97 %   18 0003 97.9 °F (36.6 °C) - 16 (!) 86/45 97 %   18 0000 97.9 °F (36.6 °C) 73 16 92/45 98 %   18 2356 - - - 90/57 98 %   18 2355 97.8 °F (36.6 °C) 72 16 90/60 98 %   18 2348 97.7 °F (36.5 °C) 69 16 96/60 98 %   18 2325 - - - 96/60 -   18 2256 - - - 105/52 99 %   182 - - - - 98 %   18 - - - 119/53 98 %   18 - - - 119/59 99 %   18 - - - 134/63 99 %   18 1949 98.7 °F (37.1 °C) 73 16 113/66 (!) 84 %       Intake/Output Summary (Last 24 hours) at 18 6181  Last data filed at 18   Gross per 24 hour   Intake              360 ml   Output              300 ml   Net               60 ml      Temp (24hrs), Av °F (36.7 °C), Min:97.7 °F (36.5 °C), Max:98.7 °F (37.1 °C)    Oxygen Therapy  O2 Sat (%): 93 % (18)  Pulse via Oximetry: 70 beats per minute (18)  O2 Device: Nasal cannula (18)  O2 Flow Rate (L/min): 3 l/min (18)   Body mass index is 38.44 kg/(m^2). Physical Exam:    General:    WD and WN, No apparent distress. Head:   Normocephalic, without obvious abnormality, atraumatic. Eyes:  PERRL; EOMI; sclera normal/non-icteric; Left eye with surrounding erythema and ecchymosisi  ENT:  Hearing is normal.  Oropharynx is clear with tacky mucous membranes   Resp:    Clear/diminished to auscultation bilaterally. No Wheezing or Rhonchi. Resp are even and unlabored  Heart[de-identified]  Regular rate and rhythm,  no murmur,   No LE edema  Abdomen:   Soft, non-tender. Not distended. Bowel sounds normal.  hepato-splenomegaly cannot be assess due to obesity   Musc/SK: Muscle strength is good and tone normal; No cyanosis. No clubbing  Skin:     Texture, turgor normal. No significant rashes or lesions.    Neurologic: CN II - XII are grossly intact - other than Eye exam as noted above  Psych: Alert and oriented x 4;  Judgement and insight are normal     Data Review:   Recent Results (from the past 24 hour(s))   CBC WITH AUTOMATED DIFF    Collection Time: 18  3:10 PM   Result Value Ref Range    WBC 12.6 (H) 4.3 - 11.1 K/uL    RBC 2.84 (L) 4.05 - 5.25 M/uL    HGB 8.4 (L) 11.7 - 15.4 g/dL    HCT 26.9 (L) 35.8 - 46.3 %    MCV 94.7 79.6 - 97.8 FL    MCH 29.6 26.1 - 32.9 PG    MCHC 31.2 (L) 31.4 - 35.0 g/dL    RDW 13.7 11.9 - 14.6 %    PLATELET 220 542 - 363 K/uL    MPV 10.1 (L) 10.8 - 14.1 FL    ABSOLUTE NRBC 0.00 0.0 - 0.2 K/uL    DF AUTOMATED      NEUTROPHILS 87 (H) 43 - 78 %    LYMPHOCYTES 6 (L) 13 - 44 %    MONOCYTES 6 4.0 - 12.0 % EOSINOPHILS 1 0.5 - 7.8 %    BASOPHILS 0 0.0 - 2.0 %    ABS. NEUTROPHILS 10.9 (H) 1.7 - 8.2 K/UL    ABS. LYMPHOCYTES 0.7 0.5 - 4.6 K/UL    ABS. MONOCYTES 0.8 0.1 - 1.3 K/UL    ABS. EOSINOPHILS 0.2 0.0 - 0.8 K/UL    ABS. BASOPHILS 0.0 0.0 - 0.2 K/UL   FERRITIN    Collection Time: 04/27/18  3:10 PM   Result Value Ref Range    Ferritin 31 8 - 388 NG/ML   TRANSFERRIN SATURATION    Collection Time: 04/27/18  3:10 PM   Result Value Ref Range    Iron 51 35 - 150 ug/dL    TIBC 304 250 - 450 ug/dL    Transferrin Saturation 17 (L) >20 %   PROTHROMBIN TIME + INR    Collection Time: 04/27/18  8:01 PM   Result Value Ref Range    Prothrombin time 29.3 (H) 11.5 - 14.5 sec    INR 2.8     CBC WITH AUTOMATED DIFF    Collection Time: 04/27/18  8:02 PM   Result Value Ref Range    WBC 11.5 (H) 4.3 - 11.1 K/uL    RBC 2.91 (L) 4.05 - 5.25 M/uL    HGB 8.6 (L) 11.7 - 15.4 g/dL    HCT 27.1 (L) 35.8 - 46.3 %    MCV 93.1 79.6 - 97.8 FL    MCH 29.6 26.1 - 32.9 PG    MCHC 31.7 31.4 - 35.0 g/dL    RDW 14.0 11.9 - 14.6 %    PLATELET 504 851 - 482 K/uL    MPV 10.9 10.8 - 14.1 FL    DF AUTOMATED      NEUTROPHILS 84 (H) 43 - 78 %    LYMPHOCYTES 9 (L) 13 - 44 %    MONOCYTES 6 4.0 - 12.0 %    EOSINOPHILS 1 0.5 - 7.8 %    BASOPHILS 0 0.0 - 2.0 %    IMMATURE GRANULOCYTES 0 0.0 - 5.0 %    ABS. NEUTROPHILS 9.5 (H) 1.7 - 8.2 K/UL    ABS. LYMPHOCYTES 1.1 0.5 - 4.6 K/UL    ABS. MONOCYTES 0.7 0.1 - 1.3 K/UL    ABS. EOSINOPHILS 0.2 0.0 - 0.8 K/UL    ABS. BASOPHILS 0.0 0.0 - 0.2 K/UL    ABS. IMM.  GRANS. 0.0 0.0 - 0.5 K/UL   METABOLIC PANEL, COMPREHENSIVE    Collection Time: 04/27/18  8:02 PM   Result Value Ref Range    Sodium 140 136 - 145 mmol/L    Potassium 5.1 3.5 - 5.1 mmol/L    Chloride 96 (L) 98 - 107 mmol/L    CO2 41 (HH) 21 - 32 mmol/L    Anion gap 3 (L) 7 - 16 mmol/L    Glucose 195 (H) 65 - 100 mg/dL    BUN 45 (H) 8 - 23 MG/DL    Creatinine 2.03 (H) 0.6 - 1.0 MG/DL    GFR est AA 31 (L) >60 ml/min/1.73m2    GFR est non-AA 26 (L) >60 ml/min/1.73m2 Calcium 10.3 8.3 - 10.4 MG/DL    Bilirubin, total 0.6 0.2 - 1.1 MG/DL    ALT (SGPT) 37 12 - 65 U/L    AST (SGOT) 48 (H) 15 - 37 U/L    Alk. phosphatase 57 50 - 136 U/L    Protein, total 7.4 6.3 - 8.2 g/dL    Albumin 3.7 3.2 - 4.6 g/dL    Globulin 3.7 (H) 2.3 - 3.5 g/dL    A-G Ratio 1.0 (L) 1.2 - 3.5     FFP, ALLOCATE    Collection Time: 04/27/18 10:00 PM   Result Value Ref Range    Unit number [id number]     Blood component type FP 24h, Thaw     Unit division 00     Status of unit ISSUED     Unit number [id number]     Blood component type FP 24h, Thaw     Unit division 00     Status of unit ISSUED    TYPE & SCREEN    Collection Time: 04/27/18 10:26 PM   Result Value Ref Range    Crossmatch Expiration 04/30/2018     ABO/Rh(D) A POSITIVE     Antibody screen NEG      CXR Results  (Last 48 hours)    None        CT Results  (Last 48 hours)               04/28/18 0227  CT HEAD WO CONT Final result    Impression:  IMPRESSION:       Apparent subdural hematoma seen on the prior study likely represented artifact. Low-density changes in the bilateral parieto-occipital lobes unchanged. Date of Dictation: 4/28/2018 2:32 AM   .       Narrative:  CT HEAD WITHOUT CONTRAST        HISTORY:  Subdural hematoma. COMPARISON: 4/27/2018       TECHNIQUE: Axial imaging was performed without intravenous contrast utilizing   5mm slice thickness. Sagittal and coronal reformats were performed. Radiation   dose reduction techniques were used for this study. Our CT scanner uses one or   all of the following:       Automated exposure control, adjustment of the MAS or KUB according to patient's   size and iterative reconstruction. FINDINGS:           *BRAIN:       -  There are no early signs of territorial or lacunar infarction by CT.      -  Right posterior parietal presumed subdural hematoma on the prior study has   resolved and likely represented an artifact.  Low-density changes in the   bilateral parieto-occipital lobes unchanged. -  No gross white matter abnormality by CT.       *VISUALIZED PARANASAL SINUSES: Well aerated. *MASTOIDS:  Clear. *CALVARIUM AND SCALP: Unremarkable. 04/27/18 2046  CT HEAD WO CONT Final result    Impression:  IMPRESSION:       Small right posterior parietal subdural hematoma. Low-density changes within both occipital lobes. Consider the possibility of   nonhemorrhagic contusions. Date of Dictation: 4/27/2018 8:59 PM   .       Narrative:  CT HEAD WITHOUT CONTRAST        HISTORY:  Head trauma. COMPARISON: 5/15/2014       TECHNIQUE: Axial imaging was performed without intravenous contrast utilizing   5mm slice thickness. Sagittal and coronal reformats were performed. Radiation   dose reduction techniques were used for this study. Our CT scanner uses one or   all of the following:       Automated exposure control, adjustment of the MAS or KUB according to patient's   size and iterative reconstruction. FINDINGS:           *BRAIN:       -  New low-density changes within both occipital lobes. -  Small right posterior parietal subdural hematoma. -  For patient's age, the scattered areas of white matter hypodensities may   represent a chronic small vessel white matter ischemia. However this is   nonspecific. *VISUALIZED PARANASAL SINUSES: Well aerated. *MASTOIDS:  Clear. *CALVARIUM AND SCALP: Unremarkable. 04/27/18 2046  CT MAXILLOFACIAL WO CONT Final result    Impression:  IMPRESSION:       No fractures. Diffuse bilateral carotid calcification. Date of Dictation: 4/27/2018 9:01 PM           Narrative:  CT FACIAL BONES  WITHOUT CONTRAST       HISTORY: Facial trauma         COMPARISON: None       TECHNIQUE:  Thin section helical axial images were acquired. Coronal and   sagittal  reformatted images were generated. Dose reduction techniques used:  Automated exposure control, adjustment of the   mAs and/or kVp according to patient's size, and iterative reconstruction   techniques. FINDINGS:   *  SINUSES: The visualized paranasal sinuses are clear. *  NASAL CAVITY: Unremarkable. *  ORBITS: Unremarkable. *  FACIAL BONES: No fracture or bone destruction. *  FACIAL SOFT TISSUES: Diffuse bilateral carotid calcification. .                     Assessment and Plan: Active Hospital Problems    Diagnosis Date Noted    Closed nondisplaced fracture of shaft of fifth metacarpal bone of left hand 04/28/2018    Subdural hematoma ([address] 04/27/2018    Long term (current) use of anticoagulants 04/19/2018    Type 2 diabetes mellitus with nephropathy ([address] 12/18/2017    Pulmonary hypertension ([address] 06/15/2016    S/P AVR (aortic valve replacement)      Mechanical/Artific      Cardiomyopathy ([address]     HLD (hyperlipidemia)     REJI (obstructive sleep apnea)-cpap 04/02/2014    COPD (chronic obstructive pulmonary disease) ([address] 04/02/2014    CKD (chronic kidney disease) stage 3, GFR 30-59 ml/min 07/10/2013    Anticoagulated on Coumadin 07/09/2013     S/P AVR      Essential hypertension, benign 01/20/2013    Chronic combined systolic and diastolic heart failure ([address] 01/20/2013 5/8/14 ECHO:  EF 10-15%  12/2017:  EF 25-30%           · PLAN   · Continue consult with cardiology and neurosurgery re: anticoagulation  · Repeat Ct head- now showing NO subdural hematoma  · SSI  · PT/OT eval due to knee pain and difficulty walking  · Cont appropriate home meds (see MAR)  · Control symptoms (pain, n/v, fever, etc)  · Monitor appropriate labs   · DVT prophylaxis: SCDs  · Code status: Full  · Risk: high  · Anticipated DC needs:  · Estimated LOS:  Greater than 2 midnights  · Plans discussed with patient and/or caregiver; questions answered.       Med records reviewed if applicable; findings:     Critical care time if applicable:      Signed By: Jasson Guidry MD     April 28, 2018

## 2018-04-28 NOTE — PROGRESS NOTES
[address] CRITICAL CARE OUTREACH NURSE PROGRESS REPORT    SUBJECTIVE: Called to assess patient secondary to follow up, transfer from ICU. MEWS Score: 1 (04/28/18 2595)  Vitals:    04/28/18 1436 04/28/18 1622 04/28/18 1700 04/28/18 1730   BP:   115/63 106/73   Pulse:  65 72 73   Resp:  18  18   Temp:    97.9 °F (36.6 °C)   SpO2: 99% 100%  99%   Weight:       Height:            OBJECTIVE: On arrival to room, I found patient to be resting in bed, family at bedside. Pain Assessment  Pain Intensity 1: 8 (04/28/18 1701)  Pain Location 1: Hand, Wrist  Pain Intervention(s) 1: Medication (see MAR), Elevation, Emotional support, Rest  Patient Stated Pain Goal: 0    ASSESSMENT:   Alert and oriented X 4 in bed. Respirations even, non labored. SAT 99% on 3 L NC. Hematoma noted to L eye. L wrist in splint, fingers warm with sensation. Currently denies pain, SOB. No distress noted    PLAN:   Will follow per Outreach protocol.

## 2018-04-28 NOTE — PROGRESS NOTES
4/28/2018 9:53 AM    Admit Date: 4/27/2018    Admit Diagnosis: Subdural hematoma (HCC)      Subjective:   No cp or sob      Objective:      Visit Vitals    /53    Pulse 68    Temp 97.9 °F (36.6 °C)    Resp 15    Ht 5' 3\" (1.6 m)    Wt 98.4 kg (217 lb)    SpO2 97%    Breastfeeding No    BMI 38.44 kg/m2       Physical Exam:  Marveen Settle, Well Nourished, No Acute Distress, Alert & Oriented x 3, appropriate mood. Neck- supple, no JVD  CV- regular rate and rhythm no MRG  Lung- clear bilaterally  Abd- soft, nontender, nondistended  Ext- no edema bilaterally.   Skin- warm and dry        Data Review:   Recent Labs      04/28/18   0430   NA  142   K  4.7   BUN  49*   CREA  2.09*   WBC  7.4   HGB  7.4*   HCT  23.8*   PLT  183   INR  2.1       Assessment/Plan:     Principal Problem:    Subdural hematoma (HCC) (4/27/2018)- repeat ct shows artifact-  Restart coumadin- with multiple injuries will not start heparin at this time    Active Problems:    Chronic combined systolic and diastolic heart failure (HCC) (1/20/2013)- stable-s top ivf- bp low - decrease coreg and stop imdur      Overview: 5/8/14 ECHO:  EF 10-15%      12/2017:  EF 25-30%      Essential hypertension, benign (1/20/2013)      Anticoagulated on Coumadin (7/9/2013)      Overview: S/P AVR      CKD (chronic kidney disease) stage 3, GFR 30-59 ml/min (7/10/2013)      COPD (chronic obstructive pulmonary disease) ([address] (4/2/2014)      REJI (obstructive sleep apnea)-cpap (4/2/2014)      HLD (hyperlipidemia) ()      S/P AVR (aortic valve replacement) ()      Overview: Mechanical/Artific      Cardiomyopathy ([address] ()      Pulmonary hypertension ([address] (6/15/2016)      Type 2 diabetes mellitus with nephropathy ([address] (12/18/2017)      Long term (current) use of anticoagulants (4/19/2018)      Closed nondisplaced fracture of shaft of fifth metacarpal bone of left hand (4/28/2018)

## 2018-04-28 NOTE — PROGRESS NOTES
TRANSFER - IN REPORT:    Verbal report received from [address] (name) on [employer]  being received from ED(unit) for routine progression of care      Report consisted of patients Situation, Background, Assessment and   Recommendations(SBAR). Information from the following report(s) SBAR, Kardex and ED Summary was reviewed with the receiving nurse. Opportunity for questions and clarification was provided. Assessment completed upon patients arrival to unit and care assumed.

## 2018-04-28 NOTE — PROGRESS NOTES
Hospitalist Progress Note    2018  Admit Date: 2018  7:56 PM   NAME: Taina Gan   :  1950   MRN:  [id number]   Attending: Jaymie Becker MD;*  PCP:  Ani Everett MD    SUBJECTIVE:     Taina Gan is a 59years old female with s/p mechanical aortic valve on coumadin, HTN, CAD, COPD, CKD-3 admitted for mechanical fall with head trauma on  with questionable posterior small subdural hematoma. INR was 2.8 on arrival. Cardiology and Neurosurgery was consulted. NeurSx recommended INR reversal to minimize bleeding. She was given 2 units of FFP. A repeat CT head done 6 hours later, showed that subdural hematoma reported on prior CT was an artifact. Coumadin was held on admission. :  Seen at bedside  Doing well, no acute distress noted  She c/o mild pain on left eye movement and left hand/wrist  No chest pain, headache, nausea/vomiting, SOB    Review of Systems negative with exception of pertinent positives noted above      PHYSICAL EXAM       Visit Vitals    /52    Pulse 65    Temp 97.9 °F (36.6 °C)    Resp 15    Ht 5' 3\" (1.6 m)    Wt 98.4 kg (217 lb)    SpO2 100%    Breastfeeding No    BMI 38.44 kg/m2      Temp (24hrs), Av °F (36.7 °C), Min:97.7 °F (36.5 °C), Max:98.7 °F (37.1 °C)    Oxygen Therapy  O2 Sat (%): 100 % (18 0820)  Pulse via Oximetry: 65 beats per minute (18 0820)  O2 Device: Other (comment) (home Trilogy) (18 0820)  O2 Flow Rate (L/min): 3 l/min (18 0309)    Intake/Output Summary (Last 24 hours) at 18 0824  Last data filed at 18 0517   Gross per 24 hour   Intake            692.5 ml   Output              300 ml   Net            392.5 ml          General:                   WD and WN, No apparent distress. HEENT:               Head NCAT, subconjunctival Hmg in left eye with periorbital bruising and swelling.  No ict, MMM  Resp:                  breath sounds distant, clear, no wheezing or sosa  Heart[de-identified]                 normal S1,2, rhythm regular, no MRG  Abdomen:          soft, obese, nontender, non distended   Musc/SK:                  Muscle strength is good and tone normal; No cyanosis. No clubbing, left UE splinted and bandaged  Skin:                                   Texture, turgor normal. No significant rashes or lesions. Neurologic:        GCS 15, no motor or sensory deficits, CN 2-12 intact, cerebellar functions intact  Psych:                      Alert and oriented x 4;  Judgement and insight are normal  Lymphatics:         No LAD    Recent Results (from the past 24 hour(s))   CBC WITH AUTOMATED DIFF    Collection Time: 04/27/18  3:10 PM   Result Value Ref Range    WBC 12.6 (H) 4.3 - 11.1 K/uL    RBC 2.84 (L) 4.05 - 5.25 M/uL    HGB 8.4 (L) 11.7 - 15.4 g/dL    HCT 26.9 (L) 35.8 - 46.3 %    MCV 94.7 79.6 - 97.8 FL    MCH 29.6 26.1 - 32.9 PG    MCHC 31.2 (L) 31.4 - 35.0 g/dL    RDW 13.7 11.9 - 14.6 %    PLATELET 631 578 - 275 K/uL    MPV 10.1 (L) 10.8 - 14.1 FL    ABSOLUTE NRBC 0.00 0.0 - 0.2 K/uL    DF AUTOMATED      NEUTROPHILS 87 (H) 43 - 78 %    LYMPHOCYTES 6 (L) 13 - 44 %    MONOCYTES 6 4.0 - 12.0 %    EOSINOPHILS 1 0.5 - 7.8 %    BASOPHILS 0 0.0 - 2.0 %    ABS. NEUTROPHILS 10.9 (H) 1.7 - 8.2 K/UL    ABS. LYMPHOCYTES 0.7 0.5 - 4.6 K/UL    ABS. MONOCYTES 0.8 0.1 - 1.3 K/UL    ABS. EOSINOPHILS 0.2 0.0 - 0.8 K/UL    ABS.  BASOPHILS 0.0 0.0 - 0.2 K/UL   FERRITIN    Collection Time: 04/27/18  3:10 PM   Result Value Ref Range    Ferritin 31 8 - 388 NG/ML   TRANSFERRIN SATURATION    Collection Time: 04/27/18  3:10 PM   Result Value Ref Range    Iron 51 35 - 150 ug/dL    TIBC 304 250 - 450 ug/dL    Transferrin Saturation 17 (L) >20 %   PROTHROMBIN TIME + INR    Collection Time: 04/27/18  8:01 PM   Result Value Ref Range    Prothrombin time 29.3 (H) 11.5 - 14.5 sec    INR 2.8     CBC WITH AUTOMATED DIFF    Collection Time: 04/27/18  8:02 PM   Result Value Ref Range    WBC 11.5 (H) 4.3 - 11.1 K/uL    RBC 2.91 (L) 4.05 - 5.25 M/uL    HGB 8.6 (L) 11.7 - 15.4 g/dL    HCT 27.1 (L) 35.8 - 46.3 %    MCV 93.1 79.6 - 97.8 FL    MCH 29.6 26.1 - 32.9 PG    MCHC 31.7 31.4 - 35.0 g/dL    RDW 14.0 11.9 - 14.6 %    PLATELET 500 677 - 992 K/uL    MPV 10.9 10.8 - 14.1 FL    DF AUTOMATED      NEUTROPHILS 84 (H) 43 - 78 %    LYMPHOCYTES 9 (L) 13 - 44 %    MONOCYTES 6 4.0 - 12.0 %    EOSINOPHILS 1 0.5 - 7.8 %    BASOPHILS 0 0.0 - 2.0 %    IMMATURE GRANULOCYTES 0 0.0 - 5.0 %    ABS. NEUTROPHILS 9.5 (H) 1.7 - 8.2 K/UL    ABS. LYMPHOCYTES 1.1 0.5 - 4.6 K/UL    ABS. MONOCYTES 0.7 0.1 - 1.3 K/UL    ABS. EOSINOPHILS 0.2 0.0 - 0.8 K/UL    ABS. BASOPHILS 0.0 0.0 - 0.2 K/UL    ABS. IMM. GRANS. 0.0 0.0 - 0.5 K/UL   METABOLIC PANEL, COMPREHENSIVE    Collection Time: 04/27/18  8:02 PM   Result Value Ref Range    Sodium 140 136 - 145 mmol/L    Potassium 5.1 3.5 - 5.1 mmol/L    Chloride 96 (L) 98 - 107 mmol/L    CO2 41 (HH) 21 - 32 mmol/L    Anion gap 3 (L) 7 - 16 mmol/L    Glucose 195 (H) 65 - 100 mg/dL    BUN 45 (H) 8 - 23 MG/DL    Creatinine 2.03 (H) 0.6 - 1.0 MG/DL    GFR est AA 31 (L) >60 ml/min/1.73m2    GFR est non-AA 26 (L) >60 ml/min/1.73m2    Calcium 10.3 8.3 - 10.4 MG/DL    Bilirubin, total 0.6 0.2 - 1.1 MG/DL    ALT (SGPT) 37 12 - 65 U/L    AST (SGOT) 48 (H) 15 - 37 U/L    Alk.  phosphatase 57 50 - 136 U/L    Protein, total 7.4 6.3 - 8.2 g/dL    Albumin 3.7 3.2 - 4.6 g/dL    Globulin 3.7 (H) 2.3 - 3.5 g/dL    A-G Ratio 1.0 (L) 1.2 - 3.5     FFP, ALLOCATE    Collection Time: 04/27/18 10:00 PM   Result Value Ref Range    Unit number [id number]     Blood component type FP 24h, Thaw     Unit division 00     Status of unit TRANSFUSED     Unit number [id number]     Blood component type FP 24h, Thaw     Unit division 00     Status of unit ISSUED    TYPE & SCREEN    Collection Time: 04/27/18 10:26 PM   Result Value Ref Range    Crossmatch Expiration 04/30/2018     ABO/Rh(D) A POSITIVE     Antibody screen NEG    EKG, 12 LEAD, INITIAL    Collection Time: 04/28/18 12:55 AM   Result Value Ref Range    Ventricular Rate 73 BPM    Atrial Rate 73 BPM    P-R Interval 188 ms    QRS Duration 124 ms    Q-T Interval 424 ms    QTC Calculation (Bezet) 467 ms    Calculated P Axis 22 degrees    Calculated R Axis -56 degrees    Calculated T Axis 106 degrees    Diagnosis       Sinus rhythm with occasional Premature ventricular complexes  Left anterior fascicular block  Left ventricular hypertrophy with QRS widening  Inferior infarct , age undetermined  T wave abnormality, consider lateral ischemia  Abnormal ECG  When compared with ECG of 15-FEB-2017 15:34,  Inferior infarct is now Present  QT has lengthened     PROTHROMBIN TIME + INR    Collection Time: 04/28/18  4:30 AM   Result Value Ref Range    Prothrombin time 22.8 (H) 11.5 - 14.5 sec    INR 2.1     METABOLIC PANEL, BASIC    Collection Time: 04/28/18  4:30 AM   Result Value Ref Range    Sodium 142 136 - 145 mmol/L    Potassium 4.7 3.5 - 5.1 mmol/L    Chloride 98 98 - 107 mmol/L    CO2 39 (H) 21 - 32 mmol/L    Anion gap 5 (L) 7 - 16 mmol/L    Glucose 90 65 - 100 mg/dL    BUN 49 (H) 8 - 23 MG/DL    Creatinine 2.09 (H) 0.6 - 1.0 MG/DL    GFR est AA 30 (L) >60 ml/min/1.73m2    GFR est non-AA 25 (L) >60 ml/min/1.73m2    Calcium 9.9 8.3 - 10.4 MG/DL   CBC WITH AUTOMATED DIFF    Collection Time: 04/28/18  4:30 AM   Result Value Ref Range    WBC 7.4 4.3 - 11.1 K/uL    RBC 2.56 (L) 4.05 - 5.25 M/uL    HGB 7.4 (L) 11.7 - 15.4 g/dL    HCT 23.8 (L) 35.8 - 46.3 %    MCV 93.0 79.6 - 97.8 FL    MCH 28.9 26.1 - 32.9 PG    MCHC 31.1 (L) 31.4 - 35.0 g/dL    RDW 14.1 11.9 - 14.6 %    PLATELET 249 265 - 268 K/uL    MPV 10.2 (L) 10.8 - 14.1 FL    DF AUTOMATED      NEUTROPHILS 68 43 - 78 %    LYMPHOCYTES 21 13 - 44 %    MONOCYTES 8 4.0 - 12.0 %    EOSINOPHILS 3 0.5 - 7.8 %    BASOPHILS 0 0.0 - 2.0 %    IMMATURE GRANULOCYTES 0 0.0 - 5.0 %    ABS. NEUTROPHILS 5.1 1.7 - 8.2 K/UL    ABS. LYMPHOCYTES 1.6 0.5 - 4.6 K/UL    ABS. MONOCYTES 0.6 0.1 - 1.3 K/UL    ABS. EOSINOPHILS 0.2 0.0 - 0.8 K/UL    ABS. BASOPHILS 0.0 0.0 - 0.2 K/UL    ABS. IMM. GRANS. 0.0 0.0 - 0.5 K/UL   GLUCOSE, POC    Collection Time: 04/28/18  7:14 AM   Result Value Ref Range    Glucose (POC) 105 (H) 65 - 100 mg/dL         Imaging /Procedures /Studies     CT head 4/28: IMPRESSION:     Apparent subdural hematoma seen on the prior study likely represented artifact. Low-density changes in the bilateral parieto-occipital lobes unchanged. CT head 4/27: IMPRESSION:     Small right posterior parietal subdural hematoma.        Low-density changes within both occipital lobes. Consider the possibility of  nonhemorrhagic contusions.   ASSESSMENT      Hospital Problems as of 4/28/2018  Date Reviewed: 4/27/2018          Codes Class Noted - Resolved POA    Closed nondisplaced fracture of shaft of fifth metacarpal bone of left hand ICD-10-CM: S62.357A  ICD-9-CM: 815.03  4/28/2018 - Present Yes        * (Principal)Subdural hematoma ([facility] [address] ICD-10-CM: I62.00  ICD-9-CM: 432.1  4/27/2018 - Present Yes        Long term (current) use of anticoagulants (Chronic) ICD-10-CM: Z79.01  ICD-9-CM: V58.61  4/19/2018 - Present Yes        Type 2 diabetes mellitus with nephropathy (HCC) (Chronic) ICD-10-CM: E11.21  ICD-9-CM: 250.40, 583.81  12/18/2017 - Present Yes        Pulmonary hypertension (HCC) (Chronic) ICD-10-CM: I27.20  ICD-9-CM: 416.8  6/15/2016 - Present Yes        S/P AVR (aortic valve replacement) (Chronic) ICD-10-CM: Z95.2  ICD-9-CM: V43.3  Unknown - Present Yes    Overview Signed 2/23/2016 11:04 AM by Hugo Green     Mechanical/Artific             Cardiomyopathy ([facility] [address] (Chronic) ICD-10-CM: I42.9  ICD-9-CM: 425.4  Unknown - Present Yes        HLD (hyperlipidemia) (Chronic) ICD-10-CM: E78.5  ICD-9-CM: 272.4  Unknown - Present Yes        Diabetes mellitus type 2, diet-controlled ([facility] [address] (Chronic) ICD-10-CM: E11.9  ICD-9-CM: 250.00  8/28/2014 - Present         COPD (chronic obstructive pulmonary disease) ([facility] [address] (Chronic) ICD-10-CM: J44.9  ICD-9-CM: 044  4/2/2014 - Present Yes        REJI (obstructive sleep apnea)-cpap (Chronic) ICD-10-CM: G47.33  ICD-9-CM: 327.23  4/2/2014 - Present Yes        CKD (chronic kidney disease) stage 3, GFR 30-59 ml/min (Chronic) ICD-10-CM: N18.3  ICD-9-CM: 585.3  7/10/2013 - Present Yes        Anticoagulated on Coumadin (Chronic) ICD-10-CM: Z51.81, Z79.01  ICD-9-CM: V58.83, V58.61  7/9/2013 - Present Yes    Overview Signed 7/9/2013  4:16 PM by Georgia Norman NP     S/P AVR             Chronic combined systolic and diastolic heart failure (HCC) (Chronic) ICD-10-CM: I50.42  ICD-9-CM: 428.42  1/20/2013 - Present Yes    Overview Addendum 4/28/2018  4:53 AM by Autumn Ramos MD     5/8/14 ECHO:  EF 10-15%  12/2017:  EF 25-30%             Essential hypertension, benign (Chronic) ICD-10-CM: I10  ICD-9-CM: 401.1  1/20/2013 - Present Yes        MDS (myelodysplastic syndrome) (HCC) (Chronic) ICD-10-CM: D46.9  ICD-9-CM: 238.75  12/17/2011 - Present     Overview Addendum 8/29/2013 11:06 AM by Joy Carrero started in August, 2011 12/18/11 Procrit weekly and Iron stores. 5-12 12-13-12 good response to 3 weekly procrit did not need it last time    3-7-13 Pt doing well. Just wanted a \"check-up. \" Responding to Procrit every three weeks. 8-29-13 patient has missed some injections on recently restarted hemoglobin only issue , takes oral iron iron stores each time                          Plan:  - Repeat head CT is unremarkable for subdural hematoma, resolved vs artifact on first CT  - resumed coumadin, given 5 mg dose this AM.  - monitor INR daily, latest 2.1. Will give another 2.5 mg dose in the evening today. Goal INR 2.5-3.5  - PT/OT eval  - PPD  - ortho eval when transferred to medicine floor for left oblique fracture of distal fifth metacarpal metaphysis and avulsion fracture of the base of third proximal phalanx. - pain management with narcotics. Norco q4h prn  - continue pulmicort and atrovent nebs with spiriva for COPD  - continue entresto and coreg with lasix for systolic CHF (compensated). - conservative management for left subconjunctival Hmg, can use warm and cold compresses for pain and swelling. DVT Prophylaxis: coumadin  High risk with opioids on board  She will be transferred to med floor with remote tele today.     Ketty Muir MD

## 2018-04-29 LAB
ANION GAP SERPL CALC-SCNC: 4 MMOL/L (ref 7–16)
BASOPHILS # BLD: 0 K/UL (ref 0–0.2)
BASOPHILS NFR BLD: 0 % (ref 0–2)
BLD PROD TYP BPU: NORMAL
BLD PROD TYP BPU: NORMAL
BPU ID: NORMAL
BPU ID: NORMAL
BUN SERPL-MCNC: 39 MG/DL (ref 8–23)
CALCIUM SERPL-MCNC: 8.8 MG/DL (ref 8.3–10.4)
CHLORIDE SERPL-SCNC: 96 MMOL/L (ref 98–107)
CO2 SERPL-SCNC: 40 MMOL/L (ref 21–32)
CREAT SERPL-MCNC: 1.65 MG/DL (ref 0.6–1)
DIFFERENTIAL METHOD BLD: ABNORMAL
EOSINOPHIL # BLD: 0.2 K/UL (ref 0–0.8)
EOSINOPHIL NFR BLD: 2 % (ref 0.5–7.8)
ERYTHROCYTE [DISTWIDTH] IN BLOOD BY AUTOMATED COUNT: 13.8 % (ref 11.9–14.6)
GLUCOSE SERPL-MCNC: 91 MG/DL (ref 65–100)
HCT VFR BLD AUTO: 22.6 % (ref 35.8–46.3)
HGB BLD-MCNC: 7 G/DL (ref 11.7–15.4)
IMM GRANULOCYTES # BLD: 0 K/UL (ref 0–0.5)
IMM GRANULOCYTES NFR BLD AUTO: 0 % (ref 0–5)
INR PPP: 2.4
LYMPHOCYTES # BLD: 2.2 K/UL (ref 0.5–4.6)
LYMPHOCYTES NFR BLD: 25 % (ref 13–44)
MCH RBC QN AUTO: 29.3 PG (ref 26.1–32.9)
MCHC RBC AUTO-ENTMCNC: 31 G/DL (ref 31.4–35)
MCV RBC AUTO: 94.6 FL (ref 79.6–97.8)
MONOCYTES # BLD: 0.4 K/UL (ref 0.1–1.3)
MONOCYTES NFR BLD: 5 % (ref 4–12)
NEUTS SEG # BLD: 5.8 K/UL (ref 1.7–8.2)
NEUTS SEG NFR BLD: 68 % (ref 43–78)
PLATELET # BLD AUTO: 171 K/UL (ref 150–450)
PMV BLD AUTO: 11.2 FL (ref 10.8–14.1)
POTASSIUM SERPL-SCNC: 4.2 MMOL/L (ref 3.5–5.1)
PROTHROMBIN TIME: 25.7 SEC (ref 11.5–14.5)
RBC # BLD AUTO: 2.39 M/UL (ref 4.05–5.25)
SODIUM SERPL-SCNC: 140 MMOL/L (ref 136–145)
STATUS OF UNIT,%ST: NORMAL
STATUS OF UNIT,%ST: NORMAL
UNIT DIVISION, %UDIV: 0
UNIT DIVISION, %UDIV: 0
WBC # BLD AUTO: 8.6 K/UL (ref 4.3–11.1)

## 2018-04-29 PROCEDURE — 94640 AIRWAY INHALATION TREATMENT: CPT

## 2018-04-29 PROCEDURE — 74011000250 HC RX REV CODE- 250: Performed by: FAMILY MEDICINE

## 2018-04-29 PROCEDURE — 94760 N-INVAS EAR/PLS OXIMETRY 1: CPT

## 2018-04-29 PROCEDURE — 74011250637 HC RX REV CODE- 250/637: Performed by: FAMILY MEDICINE

## 2018-04-29 PROCEDURE — 77030039270 HC TU BLD FLTR CARD -A

## 2018-04-29 PROCEDURE — 74011250636 HC RX REV CODE- 250/636: Performed by: FAMILY MEDICINE

## 2018-04-29 PROCEDURE — 36415 COLL VENOUS BLD VENIPUNCTURE: CPT | Performed by: NURSE PRACTITIONER

## 2018-04-29 PROCEDURE — 97162 PT EVAL MOD COMPLEX 30 MIN: CPT

## 2018-04-29 PROCEDURE — P9040 RBC LEUKOREDUCED IRRADIATED: HCPCS | Performed by: EMERGENCY MEDICINE

## 2018-04-29 PROCEDURE — 65660000000 HC RM CCU STEPDOWN

## 2018-04-29 PROCEDURE — 74011250637 HC RX REV CODE- 250/637: Performed by: INTERNAL MEDICINE

## 2018-04-29 PROCEDURE — 30233N1 TRANSFUSION OF NONAUTOLOGOUS RED BLOOD CELLS INTO PERIPHERAL VEIN, PERCUTANEOUS APPROACH: ICD-10-PCS | Performed by: INTERNAL MEDICINE

## 2018-04-29 PROCEDURE — 77010033678 HC OXYGEN DAILY

## 2018-04-29 PROCEDURE — 85025 COMPLETE CBC W/AUTO DIFF WBC: CPT | Performed by: NURSE PRACTITIONER

## 2018-04-29 PROCEDURE — 97530 THERAPEUTIC ACTIVITIES: CPT

## 2018-04-29 PROCEDURE — 74011250637 HC RX REV CODE- 250/637: Performed by: HOSPITALIST

## 2018-04-29 PROCEDURE — 85610 PROTHROMBIN TIME: CPT | Performed by: NURSE PRACTITIONER

## 2018-04-29 PROCEDURE — 86580 TB INTRADERMAL TEST: CPT | Performed by: INTERNAL MEDICINE

## 2018-04-29 PROCEDURE — 80048 BASIC METABOLIC PNL TOTAL CA: CPT | Performed by: NURSE PRACTITIONER

## 2018-04-29 PROCEDURE — 74011000302 HC RX REV CODE- 302: Performed by: INTERNAL MEDICINE

## 2018-04-29 PROCEDURE — 36430 TRANSFUSION BLD/BLD COMPNT: CPT

## 2018-04-29 RX ORDER — SODIUM CHLORIDE 9 MG/ML
250 INJECTION, SOLUTION INTRAVENOUS AS NEEDED
Status: DISCONTINUED | OUTPATIENT
Start: 2018-04-29 | End: 2018-05-02 | Stop reason: HOSPADM

## 2018-04-29 RX ORDER — WARFARIN SODIUM 5 MG/1
5 TABLET ORAL
Status: DISCONTINUED | OUTPATIENT
Start: 2018-04-29 | End: 2018-05-02 | Stop reason: HOSPADM

## 2018-04-29 RX ORDER — HYDROMORPHONE HYDROCHLORIDE 2 MG/ML
0.5 INJECTION, SOLUTION INTRAMUSCULAR; INTRAVENOUS; SUBCUTANEOUS
Status: DISCONTINUED | OUTPATIENT
Start: 2018-04-29 | End: 2018-04-29

## 2018-04-29 RX ORDER — OXYCODONE HYDROCHLORIDE 5 MG/1
10 TABLET ORAL
Status: DISCONTINUED | OUTPATIENT
Start: 2018-04-29 | End: 2018-05-02 | Stop reason: HOSPADM

## 2018-04-29 RX ADMIN — TRAMADOL HYDROCHLORIDE 50 MG: 50 TABLET, FILM COATED ORAL at 10:40

## 2018-04-29 RX ADMIN — SIMVASTATIN 20 MG: 20 TABLET, FILM COATED ORAL at 21:48

## 2018-04-29 RX ADMIN — FUROSEMIDE 80 MG: 40 TABLET ORAL at 09:26

## 2018-04-29 RX ADMIN — WARFARIN SODIUM 5 MG: 5 TABLET ORAL at 18:06

## 2018-04-29 RX ADMIN — ALBUTEROL SULFATE 2.5 MG: 2.5 SOLUTION RESPIRATORY (INHALATION) at 19:32

## 2018-04-29 RX ADMIN — TRAMADOL HYDROCHLORIDE 50 MG: 50 TABLET, FILM COATED ORAL at 01:21

## 2018-04-29 RX ADMIN — FUROSEMIDE 80 MG: 40 TABLET ORAL at 17:17

## 2018-04-29 RX ADMIN — Medication 5 ML: at 06:00

## 2018-04-29 RX ADMIN — CARVEDILOL 3.12 MG: 3.12 TABLET, FILM COATED ORAL at 09:26

## 2018-04-29 RX ADMIN — SACUBITRIL AND VALSARTAN 1 TABLET: 24; 26 TABLET, FILM COATED ORAL at 17:17

## 2018-04-29 RX ADMIN — BUDESONIDE 500 MCG: 0.5 INHALANT RESPIRATORY (INHALATION) at 08:13

## 2018-04-29 RX ADMIN — ALBUTEROL SULFATE 2.5 MG: 2.5 SOLUTION RESPIRATORY (INHALATION) at 14:43

## 2018-04-29 RX ADMIN — Medication 10 ML: at 17:17

## 2018-04-29 RX ADMIN — OXYCODONE HYDROCHLORIDE 10 MG: 5 TABLET ORAL at 17:16

## 2018-04-29 RX ADMIN — Medication 5 ML: at 21:49

## 2018-04-29 RX ADMIN — BUDESONIDE 500 MCG: 0.5 INHALANT RESPIRATORY (INHALATION) at 19:32

## 2018-04-29 RX ADMIN — ESCITALOPRAM OXALATE 10 MG: 10 TABLET ORAL at 09:25

## 2018-04-29 RX ADMIN — OXYCODONE HYDROCHLORIDE 10 MG: 5 TABLET ORAL at 21:48

## 2018-04-29 RX ADMIN — ALBUTEROL SULFATE 2.5 MG: 2.5 SOLUTION RESPIRATORY (INHALATION) at 08:13

## 2018-04-29 RX ADMIN — TUBERCULIN PURIFIED PROTEIN DERIVATIVE 5 UNITS: 5 INJECTION, SOLUTION INTRADERMAL at 10:41

## 2018-04-29 RX ADMIN — HYDROMORPHONE HYDROCHLORIDE 0.5 MG: 2 INJECTION, SOLUTION INTRAMUSCULAR; INTRAVENOUS; SUBCUTANEOUS at 05:45

## 2018-04-29 RX ADMIN — SACUBITRIL AND VALSARTAN 1 TABLET: 24; 26 TABLET, FILM COATED ORAL at 09:26

## 2018-04-29 RX ADMIN — CARVEDILOL 3.12 MG: 3.12 TABLET, FILM COATED ORAL at 17:17

## 2018-04-29 RX ADMIN — TIOTROPIUM BROMIDE 18 MCG: 18 CAPSULE ORAL; RESPIRATORY (INHALATION) at 08:12

## 2018-04-29 RX ADMIN — ACETAMINOPHEN 650 MG: 325 TABLET ORAL at 03:13

## 2018-04-29 NOTE — PROGRESS NOTES
4/29/2018 11:51 AM    Admit Date: 4/27/2018    Admit Diagnosis: Subdural hematoma (HCC)      Subjective:   No cp or sob      Objective:      Visit Vitals    /69 (BP 1 Location: Left arm, BP Patient Position: At rest)    Pulse 74    Temp 98.4 °F (36.9 °C)    Resp 18    Ht 5' 3\" (1.6 m)    Wt 99.7 kg (219 lb 12.8 oz)    SpO2 99%    Breastfeeding No    BMI 38.94 kg/m2       Physical Exam:  [facility], Well Nourished, No Acute Distress, Alert & Oriented x 3, appropriate mood. Neck- supple, no JVD  CV- regular rate and rhythm and crisp clicks  Lung- clear bilaterally  Abd- soft, nontender, nondistended  Ext- no edema bilaterally.   Skin- warm and dry        Data Review:   Recent Labs      04/29/18   0642   NA  140   K  4.2   BUN  39*   CREA  1.65*   WBC  8.6   HGB  7.0*   HCT  22.6*   PLT  171   INR  2.4       Assessment/Plan:     Principal Problem:    Subdural hematoma (HCC) (4/27/2018)    Active Problems:    Chronic combined systolic and diastolic heart failure ([address] (1/20/2013)      Overview: 5/8/14 ECHO:  EF 10-15%      12/2017:  EF 25-30%      Essential hypertension, benign (1/20/2013)      Anticoagulated on Coumadin (7/9/2013)      Overview: S/P AVR      CKD (chronic kidney disease) stage 3, GFR 30-59 ml/min (7/10/2013)      COPD (chronic obstructive pulmonary disease) ([address] (4/2/2014)      REJI (obstructive sleep apnea)-cpap (4/2/2014)      HLD (hyperlipidemia) ()      S/P AVR (aortic valve replacement) ()inr near therapeutic- no new recs- will be on stand-by      Overview: Mechanical/Artific      Cardiomyopathy ([address] ()      Pulmonary hypertension ([address] (6/15/2016)      Type 2 diabetes mellitus with nephropathy ([address] (12/18/2017)      Long term (current) use of anticoagulants (4/19/2018)      Closed nondisplaced fracture of shaft of fifth metacarpal bone of left hand (4/28/2018)

## 2018-04-29 NOTE — PROGRESS NOTES
Problem: Falls - Risk of  Goal: *Absence of Falls  Document Tia Fall Risk and appropriate interventions in the flowsheet.    Outcome: Progressing Towards Goal  Fall Risk Interventions:  Mobility Interventions: PT Consult for mobility concerns, OT consult for ADLs, Communicate number of staff needed for ambulation/transfer         Medication Interventions: Evaluate medications/consider consulting pharmacy    Elimination Interventions: Call light in reach, Patient to call for help with toileting needs    History of Falls Interventions: Consult care management for discharge planning, Door open when patient unattended, Evaluate medications/consider consulting pharmacy, Investigate reason for fall

## 2018-04-29 NOTE — PROGRESS NOTES
Mrs. Feng Drain in bed with family at bedside. Alert, oriented in all spheres. Lung sounds clear with diminished bases. 2 lpm NC in place and denies dyspnea or cough. Telemetry monitor in place with NSR. Left hand and wrist edematous and wrapped with ACE bandage. Top of right hand with edematous \"knot\". Right knee with 2+ edema and bilateral legs with 1+ pitting edema and multiple bruises. Left eye with large bruise around skin and blood in eye. States pain is \"tolerable at a 6\" at this time and wants to wait on availability of IV Dilaudid. Call light in hand and door open.

## 2018-04-29 NOTE — PROGRESS NOTES
Problem: Mobility Impaired (Adult and Pediatric)  Goal: *Acute Goals and Plan of Care (Insert Text)  LTG:  (1.)Ms. Anders Thompson will move from supine to sit and sit to supine , scoot up and down and roll side to side in bed with INDEPENDENCE within 4-7 treatment day(s). (2.)Ms. Anders Thompson will transfer from bed to chair and chair to bed with INDEPENDENCE using the least restrictive device within 4-7 treatment day(s). (3.)Ms. Anders Thompson will ambulate with INDEPENDENCE for 250 feet with the least restrictive device within 4-7 treatment day(s). ________________________________________________________________________________________________     PHYSICAL THERAPY: Initial Assessment, Treatment Day: Day of Assessment, AM 4/29/2018  INPATIENT: Hospital Day: 3  [place] / [place] / Product Type: Medicare /      NAME/AGE/GENDER: Silvano Bermudez is a 77 y.o. female   PRIMARY DIAGNOSIS: Subdural hematoma (HCC) Subdural hematoma (HCC) Subdural hematoma (HCC)        ICD-10: Treatment Diagnosis:    · Generalized Muscle Weakness (M62.81)  · Difficulty in walking, Not elsewhere classified (R26.2)  · History of falling (Z91.81)   · Unsteadiness on feet (R26.81)    Precaution/Allergies:  Sulfa (sulfonamide antibiotics) and Aspirin      ASSESSMENT:     Ms. Anders Thompson presents supine in bed upon physical therapist entry to room this session. She was able to sit up at the edge of the bed with min-modA with increased time secondary to pt's inability to use L hand for transfer (in splint due to metacarpal fracture) and decreased use of R hand (due to hematoma). She then required only S-CGA for stand step transfer to recliner, and S for controlled sit. Pt left sitting in recliner with all needs met at end of session. At baseline, pt lives alone and uses no AD for ambulation.  Pt may benefit from skilled PT to address the below listed deficits to improve her safety and independence with transfers and ambulation prior to discharge. This section established at most recent assessment   PROBLEM LIST (Impairments causing functional limitations):  1. Decreased Strength  2. Decreased ADL/Functional Activities  3. Decreased Transfer Abilities  4. Decreased Ambulation Ability/Technique  5. Decreased Balance  6. Increased Pain  7. Decreased Activity Tolerance  8. Increased Fatigue  9. Decreased Flexibility/Joint Mobility  10. Edema/Girth   INTERVENTIONS PLANNED: (Benefits and precautions of physical therapy have been discussed with the patient.)  1. Balance Exercise  2. Bed Mobility  3. Gait Training  4. Home Exercise Program (HEP)  5. Neuromuscular Re-education/Strengthening  6. Range of Motion (ROM)  7. Therapeutic Activites  8. Therapeutic Exercise/Strengthening  9. Transfer Training  10. Group Therapy     TREATMENT PLAN: Frequency/Duration: 3 times a week for duration of hospital stay  Rehabilitation Potential For Stated Goals: Good     RECOMMENDED REHABILITATION/EQUIPMENT: (at time of discharge pending progress): Due to the probability of continued deficits (see above) this patient will likely need continued skilled physical therapy after discharge. Equipment:    None at this time              HISTORY:   History of Present Injury/Illness (Reason for Referral):  Per MD note: \"Patient is a 77 y.o. female who presents to the ER after a fall at home in which she hit her head. Denies syncope or LOC. She states that her foot just got caught on a step. She hit the left side of her head and does have a large bruise around left eye. Has pain in hands and knees. Reports nausea, no vomiting, and a headache. She is on coumadin with an INR of 2 for mechanical valve. Head CT shows a small subdural hematoma. ER discussed with cardiology and neurosurgery; they have decided to reverse anti-coagulation to less than 2 with FFP to minimize subdural bleeding. Recommend repeat CT in about 6 hours.   xrays also show nondisplaced fracture of shaft of fifth metacarpal bone of left hand. Splint has been applied. \"  Past Medical History/Comorbidities:   Ms. Yoly Kent  has a past medical history of Anticoagulated on Coumadin (2013); Benign hypertension; CAD (coronary artery disease) (2013); Cardiomyopathy [facility]); CHF (congestive heart failure) ([facility] (2017); Chronic left-sided low back pain without sciatica (6/15/2016); Chronic respiratory failure ([facility] (2014); CKD (chronic kidney disease) stage 3, GFR 30-59 ml/min (7/10/2013); COPD (chronic obstructive pulmonary disease) ([facility] (2014); Essential hypertension, benign (2013); HLD (hyperlipidemia); ICD (implantable cardioverter-defibrillator) in place (10/2/2014); Iron deficiency anemia due to chronic blood loss (2009); MDS (myelodysplastic syndrome) ([facility] (2011); REJI (obstructive sleep apnea)-cpap (2014); Osteoarthritis; Osteopenia; Quadrantanopia; Secondary pulmonary hypertension; Tachycardia; and Visual complaint (2015). She also has no past medical history of Contact dermatitis and other eczema, due to unspecified cause; Difficult intubation; Malignant hyperthermia due to anesthesia; Nausea & vomiting; Pseudocholinesterase deficiency; or Unspecified adverse effect of anesthesia. Ms. Yoly Kent  has a past surgical history that includes cardiac catheterization (); ir bx bone marrow diagnostic (2011); hx aortic valve replacement (, ); hx  section; hx tubal ligation; hx heart catheterization (); hx coronary stent placement (May, 2014); hx pacemaker (10/2/2014); and hx endoscopy (2009).   Social History/Living Environment:   Home Environment: Apartment  # Steps to Enter: 2 (pt states there are other entrances without steps)  One/Two Story Residence: One story  Living Alone: Yes  Support Systems: Child(alysha) (family comes to check on her every day)  Patient Expects to be Discharged to[de-identified] 1 [facility] [facility]  Current DME Used/Available at Home:  (pt does not use cane/walker at home)  Prior Level of Function/Work/Activity:  Pt lived alone and required no AD for ambulation  Personal Factors:          Sex:  female        Age:  77 y.o. Number of Personal Factors/Comorbidities that affect the Plan of Care: 3+: HIGH COMPLEXITY   EXAMINATION:   Most Recent Physical Functioning:   Gross Assessment:  AROM: Generally decreased, functional  Strength: Generally decreased, functional  Coordination: Generally decreased, functional               Posture:  Posture (WDL): Exceptions to WDL  Posture Assessment: Cervical, Kyphosis, Forward head, Rounded shoulders, Trunk flexion, Increased  Balance:  Sitting: Impaired  Sitting - Static: Good (unsupported)  Sitting - Dynamic: Fair (occasional)  Standing: Impaired  Standing - Static: Fair  Standing - Dynamic : Fair Bed Mobility:  Rolling: Minimum assistance  Supine to Sit: Minimum assistance; Moderate assistance;Assist x1  Sit to Supine:  (not performed)  Scooting: Minimum assistance (to scoot forward to edge of bed in sitting)  Interventions: Manual cues; Tactile cues; Verbal cues (cues for pushing up to sitting position; cues for stability)  Wheelchair Mobility:     Transfers:  Sit to Stand: Supervision;Contact guard assistance  Stand to Sit: Supervision  Gait:     Base of Support: Widened  Speed/Winifred: Shuffled; Slow  Step Length: Left shortened;Right shortened  Gait Abnormalities: Altered arm swing;Decreased step clearance;Trunk sway increased; Shuffling gait  Distance (ft): 2 Feet (ft) (stand step transfer to chair)  Assistive Device:  (none)  Ambulation - Level of Assistance: Contact guard assistance;Supervision (pt did not want any assist to transfer)  Interventions: Verbal cues; Tactile cues (cues for safety)      Body Structures Involved:  1. Nerves  2. Eyes and Ears  3. Bones  4. Joints  5. Muscles  6. Ligaments Body Functions Affected:  1. Sensory/Pain  2. Neuromusculoskeletal  3.  Movement Related  4. Skin Related Activities and Participation Affected:  1. General Tasks and Demands  2. Mobility  3. Self Care  4. Domestic Life  5. Interpersonal Interactions and Relationships  6. Community, Social and [place]   Number of elements that affect the Plan of Care: 4+: HIGH COMPLEXITY   CLINICAL PRESENTATION:   Presentation: Evolving clinical presentation with changing clinical characteristics: MODERATE COMPLEXITY   CLINICAL DECISION MAKIN [facility] Mobility Inpatient Short Form  How much difficulty does the patient currently have. .. Unable A Lot A Little None   1. Turning over in bed (including adjusting bedclothes, sheets and blankets)? [] 1   [] 2   [x] 3   [] 4   2. Sitting down on and standing up from a chair with arms ( e.g., wheelchair, bedside commode, etc.)   [] 1   [] 2   [x] 3   [] 4   3. Moving from lying on back to sitting on the side of the bed? [] 1   [x] 2   [] 3   [] 4   How much help from another person does the patient currently need. .. Total A Lot A Little None   4. Moving to and from a bed to a chair (including a wheelchair)? [] 1   [] 2   [x] 3   [] 4   5. Need to walk in hospital room? [] 1   [x] 2   [] 3   [] 4   6. Climbing 3-5 steps with a railing? [x] 1   [] 2   [] 3   [] 4   © , Trustees of [address] [place] [address], under license to [employer]. All rights reserved      Score:  Initial: 14 Most Recent: X (Date: -- )    Interpretation of Tool:  Represents activities that are increasingly more difficult (i.e. Bed mobility, Transfers, Gait). Score 24 23 22-20 19-15 14-10 9-7 6     Modifier CH CI CJ CK CL CM CN      ?  Mobility - Walking and Moving Around:     - CURRENT STATUS: CL - 60%-79% impaired, limited or restricted    - GOAL STATUS: CJ - 20%-39% impaired, limited or restricted    - D/C STATUS:  ---------------To be determined---------------  [place] / [place] / Product Type: Medicare /      Medical Necessity:     · Patient is expected to demonstrate progress in strength, range of motion, balance and functional technique to increase independence with functional mobility and improve safety during transfers and ambulation. · Patient demonstrates good rehab potential due to higher previous functional level. Reason for Services/Other Comments:  · Patient continues to require modification of therapeutic interventions to increase complexity of exercises. Use of outcome tool(s) and clinical judgement create a POC that gives a: Questionable prediction of patient's progress: MODERATE COMPLEXITY            TREATMENT:   (In addition to Assessment/Re-Assessment sessions the following treatments were rendered)   Pre-treatment Symptoms/Complaints:  Pt eager to participate in therapy. Pain: Initial:   Pain Intensity 1:  (no number verbalized at beginning of session)  Pain Location 1: Hand  Pain Orientation 1: Left, Right  Post Session:  No change in pain reported     Therapeutic Activity: (    10 minutes): Therapeutic activities including Bed transfers, Chair transfers, Ambulation on level ground, static standing, and exercises (LAQ x 5-10 reps each LE, sitting marches x 10 reps) to improve mobility, strength, balance and dynamic movement of leg - bilateral to improve functional mobility. Required minimal Verbal cues; Tactile cues (cues for safety) to promote static and dynamic balance in standing. Braces/Orthotics/Lines/Etc:   · drain (purwick)  · O2 Device: Nasal cannula  Treatment/Session Assessment:    · Response to Treatment:  Pt sitting in recliner with legs down at end of session with all needs met. · Interdisciplinary Collaboration:   o Physical Therapist  o Registered Nurse  · After treatment position/precautions:   o Up in chair  o Bed/Chair-wheels locked  o Bed in low position  o Call light within reach  o RN notified   · Compliance with Program/Exercises:  Will assess as treatment progresses. · Recommendations/Intent for next treatment session: \"Next visit will focus on advancements to more challenging activities and reduction in assistance provided\".   Total Treatment Duration:  PT Patient Time In/Time Out  Time In: 0855  Time Out: [address], DPT

## 2018-04-29 NOTE — CONSULTS
ORTHO:    FULL CONSULT TO FOLLOW    PATIENT SEEN AND EXAMINED    [facility] AT BEDSIDE    X-RAYS OF RIGHT HAND REVIEWED - 5TH PROXIMAL PHALANX FRACTURE    XRAYS OF LEFT HAND REVIEWED - AVULSION FRACTURE OF THE 3RD PROXIMAL PHALANX AND 5TH METACARPAL FRACTURE    NO LIFTING WITH EITHER HAND

## 2018-04-29 NOTE — PROGRESS NOTES
Critical Care Outreach Nurse Progress Report:    Subjective: In to assess pt secondary to f/u ICU transfer. MEWS Score: 2 (04/29/18 1444)    Vitals:    04/29/18 0828 04/29/18 1300 04/29/18 1414 04/29/18 1444   BP: 113/69 92/52 (!) 86/55 94/55   Pulse: 74 69 70 70   Resp: 18 18 16 20   Temp: 98.4 °F (36.9 °C) 98.2 °F (36.8 °C) 97.8 °F (36.6 °C) 97.7 °F (36.5 °C)   SpO2: 99% 99% 97% 100%   Weight:       Height:            Objective: Pt sitting up in recliner, in NAD. Ranjit Hawk, Ortho NP at bedside wrapping pt's Left hand/wrist in splint. Pain Intensity 1: 6 (04/29/18 1040)  Pain Location 1: Hand  Pain Intervention(s) 1: Medication (see MAR)  Patient Stated Pain Goal: 6    Assessment: A&Ox4. Neuro intact. Left periorbital swelling present. Left eye hemorrhage also present. Pupils 2mm, round and brisk. No visual changes. Lung sounds clear. O2 Sat 99% on 3L NC.  NSR w/BBB, HR 73 on remote telemetry. SBP 90/110s. Left hand has fractures, swelling/bruising present-- splint placed by Ortho NP. Right hand swelling/bruising present-- apply ACE wrap per Ortho NP. Right knee swelling and pain when standing/palpating. Appetite ok. UOP ok. Being medicated for pain prn per MAR. Plan: Hgb 7.0 this am-- to receive 1 unit PRBCs. Per Ortho NP, no lifting with either hand. PT/OT working with pt. Continue current care. Will follow per outreach protocol.

## 2018-04-29 NOTE — PROGRESS NOTES
Patient sitting up in bed on O2 n/c, slept throughout most of night with trilogy, patient c/o pain 8/10 without much relief with prn tylenol and tramadol, on called paged, received order for prn dilaudid 0.5mg q4h prn, patient daughter remains  @ bedside, call light within reach.

## 2018-04-29 NOTE — PROGRESS NOTES
Hospitalist Progress Note    Subjective:   Daily Progress Note: 4/29/2018 11:12 AM    Ms. Pawan Wallace is a 76 yo AAF, with a mechanical aortic valve for which she is on coumadin, who presented 4/26 for a fall on a step at home and is found to have sustained left hand fractures, right knee/hand swelling and possible small subdural hematoma. Her coumadin was held. Repeat CT imaging revealed that what was thought was a small SDH was actually artifact. Neurosurgery signed off and coumadin restarted which cardiology is managing. Left hand with several fractures. Right hand and knee with swelling but no fractures. She has a history of symptomatic anemia and sees Dr. Elier Calderon who gives her monthly procrit. Usually with hg in mid 8's but has trended down to 7. No sob but she did have issues with sbp of 80/40 last night and coreg/entresto/lasix had to be held. Daughter (hospital RN) at bedside, all questions answered.      Current Facility-Administered Medications   Medication Dose Route Frequency    tuberculin injection 5 Units  5 Units IntraDERMal ONCE    oxyCODONE IR (ROXICODONE) tablet 10 mg  10 mg Oral Q4H PRN    0.9% sodium chloride infusion 250 mL  250 mL IntraVENous PRN    albuterol (PROVENTIL VENTOLIN) nebulizer solution 2.5 mg  2.5 mg Nebulization Q4H PRN    escitalopram oxalate (LEXAPRO) tablet 10 mg  10 mg Oral DAILY    furosemide (LASIX) tablet 80 mg  80 mg Oral BID    loratadine (CLARITIN) tablet 10 mg  10 mg Oral DAILY PRN    sacubitril-valsartan (ENTRESTO) 24-26 mg tablet 1 Tab  1 Tab Oral BID    simvastatin (ZOCOR) tablet 20 mg  20 mg Oral QHS    tiotropium (SPIRIVA) inhalation capsule 18 mcg  1 Cap Inhalation DAILY    sodium chloride (NS) flush 5-10 mL  5-10 mL IntraVENous Q8H    sodium chloride (NS) flush 5-10 mL  5-10 mL IntraVENous PRN    acetaminophen (TYLENOL) tablet 650 mg  650 mg Oral Q4H PRN    diphenhydrAMINE (BENADRYL) capsule 25 mg  25 mg Oral Q4H PRN    prochlorperazine (COMPAZINE) injection 5 mg  5 mg IntraVENous Q8H PRN    bisacodyl (DULCOLAX) tablet 5 mg  5 mg Oral DAILY PRN    LORazepam (ATIVAN) tablet 1 mg  1 mg Oral BID PRN    naloxone (NARCAN) injection 0.4 mg  0.4 mg IntraVENous PRN    budesonide (PULMICORT) 500 mcg/2 ml nebulizer suspension  500 mcg Nebulization BID RT    And    albuterol (PROVENTIL VENTOLIN) nebulizer solution 2.5 mg  2.5 mg Nebulization Q6HWA RT    carvedilol (COREG) tablet 3.125 mg  3.125 mg Oral BID WITH MEALS    traMADol (ULTRAM) tablet 50 mg  50 mg Oral Q6H PRN    0.9% sodium chloride infusion 250 mL  250 mL IntraVENous PRN        Review of Systems  A comprehensive review of systems was negative except for that written in the HPI.     Objective:     Visit Vitals    /69 (BP 1 Location: Left arm, BP Patient Position: At rest)    Pulse 74    Temp 98.4 °F (36.9 °C)    Resp 18    Ht 5' 3\" (1.6 m)    Wt 99.7 kg (219 lb 12.8 oz)    SpO2 99%    Breastfeeding No    BMI 38.94 kg/m2    O2 Flow Rate (L/min): 3 l/min O2 Device: Nasal cannula    Temp (24hrs), Av.9 °F (36.6 °C), Min:97.6 °F (36.4 °C), Max:98.4 °F (36.9 °C)          1901 -  0700  In: 1427.5 [P.O.:360; I.V.:375]  Out: 2100 [Urine:2100]    General: awake, alert, oriented, cooperative, obese  Eyes; non icteric, EOMI  Neck; supple  CV: RRR, 3+ ONEIL  Pulm; diminished in bases, non labored  Abd; soft, active BS  Ext: right hand edematous and swollen with decreased range of motion, left hand wrapped in bandage, palpable radial pulses    Additional comments:I reviewed the patient's new clinical lab test results. j    Data Review    Recent Results (from the past 24 hour(s))   PROTHROMBIN TIME + INR    Collection Time: 18  6:42 AM   Result Value Ref Range    Prothrombin time 25.7 (H) 11.5 - 14.5 sec    INR 2.4     METABOLIC PANEL, BASIC    Collection Time: 18  6:42 AM   Result Value Ref Range    Sodium 140 136 - 145 mmol/L    Potassium 4.2 3.5 - 5.1 mmol/L Chloride 96 (L) 98 - 107 mmol/L    CO2 40 (H) 21 - 32 mmol/L    Anion gap 4 (L) 7 - 16 mmol/L    Glucose 91 65 - 100 mg/dL    BUN 39 (H) 8 - 23 MG/DL    Creatinine 1.65 (H) 0.6 - 1.0 MG/DL    GFR est AA 40 (L) >60 ml/min/1.73m2    GFR est non-AA 33 (L) >60 ml/min/1.73m2    Calcium 8.8 8.3 - 10.4 MG/DL   CBC WITH AUTOMATED DIFF    Collection Time: 04/29/18  6:42 AM   Result Value Ref Range    WBC 8.6 4.3 - 11.1 K/uL    RBC 2.39 (L) 4.05 - 5.25 M/uL    HGB 7.0 (L) 11.7 - 15.4 g/dL    HCT 22.6 (L) 35.8 - 46.3 %    MCV 94.6 79.6 - 97.8 FL    MCH 29.3 26.1 - 32.9 PG    MCHC 31.0 (L) 31.4 - 35.0 g/dL    RDW 13.8 11.9 - 14.6 %    PLATELET 732 620 - 563 K/uL    MPV 11.2 10.8 - 14.1 FL    DF AUTOMATED      NEUTROPHILS 68 43 - 78 %    LYMPHOCYTES 25 13 - 44 %    MONOCYTES 5 4.0 - 12.0 %    EOSINOPHILS 2 0.5 - 7.8 %    BASOPHILS 0 0.0 - 2.0 %    IMMATURE GRANULOCYTES 0 0.0 - 5.0 %    ABS. NEUTROPHILS 5.8 1.7 - 8.2 K/UL    ABS. LYMPHOCYTES 2.2 0.5 - 4.6 K/UL    ABS. MONOCYTES 0.4 0.1 - 1.3 K/UL    ABS. EOSINOPHILS 0.2 0.0 - 0.8 K/UL    ABS. BASOPHILS 0.0 0.0 - 0.2 K/UL    ABS. IMM.  GRANS. 0.0 0.0 - 0.5 K/UL         Assessment/Plan:     Principal Problem:    Subdural hematoma (HCC) (4/27/2018)    Active Problems:    Chronic combined systolic and diastolic heart failure ([facility] (1/20/2013)      Overview: 5/8/14 ECHO:  EF 10-15%      12/2017:  EF 25-30%      Essential hypertension, benign (1/20/2013)      Anticoagulated on Coumadin (7/9/2013)      Overview: S/P AVR      CKD (chronic kidney disease) stage 3, GFR 30-59 ml/min (7/10/2013)      COPD (chronic obstructive pulmonary disease) ([address] (4/2/2014)      REJI (obstructive sleep apnea)-cpap (4/2/2014)      HLD (hyperlipidemia) ()      S/P AVR (aortic valve replacement) ()      Overview: Mechanical/Artific      Cardiomyopathy ([address] ()      Pulmonary hypertension ([address] (6/15/2016)      Type 2 diabetes mellitus with nephropathy ([address] (12/18/2017)      Long term (current) use of anticoagulants (4/19/2018)      Closed nondisplaced fracture of shaft of fifth metacarpal bone of left hand (4/28/2018)    due to hypotension requiring holding of cardiac meds will transfuse one unit PRBCs and stop iv dilaudid. Add prn oxycodone for pain control. PT/OT consulted. Consult SW and place PPD in case STR needed. Consult orthopedic surgery for left hand injuries, may need hand surgeon? Coumadin resumed with goal INR 2.5 to 3.5. Consultants appreciated.      Care Plan discussed with: Patient/Family      Signed By: Kalina Villeda MD     April 29, 2018

## 2018-04-29 NOTE — PROGRESS NOTES
Mrs. Courtney Tafoya in bed with family at bedside. Uneventful shift. Worked with therapy and sat in chair. Fair appetite; being fed by family and staff as unable to use hands well with fractures. Telemetry monitor in place with underlying paced rhythm. [address] in [facility] most of day; did come out of place for some time this afternoon. Denies needs at this time. Bed exit alarm In place and door open.

## 2018-04-29 NOTE — PROGRESS NOTES
Date of Outreach Update:  Vianca Beck was seen and assessed. Previous Outreach assessment has been reviewed. There have been no significant clinical changes since the completion of the last dated Outreach assessment. Will continue to follow up per outreach protocol.     Signed By:   Jude Cheney RN    April 29, 2018 6:37 PM

## 2018-04-29 NOTE — PROGRESS NOTES
Patient is sitting up in bed, alert and oriented X4, on 4L n/c, RR even and unlabored, L arm splint in place, patient admits to some discomfort, however states that medication given earlier has not took full effect, patient denies needs, call light within reach.

## 2018-04-30 ENCOUNTER — APPOINTMENT (OUTPATIENT)
Dept: GENERAL RADIOLOGY | Age: 68
DRG: 563 | End: 2018-04-30
Attending: NURSE PRACTITIONER
Payer: MEDICARE

## 2018-04-30 LAB
ABO + RH BLD: NORMAL
ANION GAP SERPL CALC-SCNC: 5 MMOL/L (ref 7–16)
BASOPHILS # BLD: 0 K/UL (ref 0–0.2)
BASOPHILS NFR BLD: 0 % (ref 0–2)
BLD PROD TYP BPU: NORMAL
BLOOD GROUP ANTIBODIES SERPL: NORMAL
BPU ID: NORMAL
BUN SERPL-MCNC: 49 MG/DL (ref 8–23)
CALCIUM SERPL-MCNC: 8.6 MG/DL (ref 8.3–10.4)
CHLORIDE SERPL-SCNC: 97 MMOL/L (ref 98–107)
CO2 SERPL-SCNC: 38 MMOL/L (ref 21–32)
CREAT SERPL-MCNC: 2.32 MG/DL (ref 0.6–1)
CROSSMATCH RESULT,%XM: NORMAL
DIFFERENTIAL METHOD BLD: ABNORMAL
EOSINOPHIL # BLD: 0.2 K/UL (ref 0–0.8)
EOSINOPHIL NFR BLD: 3 % (ref 0.5–7.8)
ERYTHROCYTE [DISTWIDTH] IN BLOOD BY AUTOMATED COUNT: 14.4 % (ref 11.9–14.6)
GLUCOSE SERPL-MCNC: 121 MG/DL (ref 65–100)
HCT VFR BLD AUTO: 26.1 % (ref 35.8–46.3)
HGB BLD-MCNC: 8 G/DL (ref 11.7–15.4)
IMM GRANULOCYTES # BLD: 0 K/UL (ref 0–0.5)
IMM GRANULOCYTES NFR BLD AUTO: 0 % (ref 0–5)
INR PPP: 2.6
LYMPHOCYTES # BLD: 1.8 K/UL (ref 0.5–4.6)
LYMPHOCYTES NFR BLD: 25 % (ref 13–44)
MAGNESIUM SERPL-MCNC: 2.4 MG/DL (ref 1.8–2.4)
MCH RBC QN AUTO: 28.7 PG (ref 26.1–32.9)
MCHC RBC AUTO-ENTMCNC: 30.7 G/DL (ref 31.4–35)
MCV RBC AUTO: 93.5 FL (ref 79.6–97.8)
MM INDURATION POC: 0 MM (ref 0–5)
MONOCYTES # BLD: 0.5 K/UL (ref 0.1–1.3)
MONOCYTES NFR BLD: 7 % (ref 4–12)
NEUTS SEG # BLD: 4.7 K/UL (ref 1.7–8.2)
NEUTS SEG NFR BLD: 65 % (ref 43–78)
PHOSPHATE SERPL-MCNC: 4.3 MG/DL (ref 2.3–3.7)
PLATELET # BLD AUTO: 185 K/UL (ref 150–450)
PMV BLD AUTO: 10.8 FL (ref 10.8–14.1)
POTASSIUM SERPL-SCNC: 4.6 MMOL/L (ref 3.5–5.1)
PPD POC: NORMAL NEGATIVE
PROTHROMBIN TIME: 27.6 SEC (ref 11.5–14.5)
RBC # BLD AUTO: 2.79 M/UL (ref 4.05–5.25)
SODIUM SERPL-SCNC: 140 MMOL/L (ref 136–145)
SPECIMEN EXP DATE BLD: NORMAL
STATUS OF UNIT,%ST: NORMAL
UNIT DIVISION, %UDIV: 0
WBC # BLD AUTO: 7.3 K/UL (ref 4.3–11.1)

## 2018-04-30 PROCEDURE — 65660000000 HC RM CCU STEPDOWN

## 2018-04-30 PROCEDURE — 74011000250 HC RX REV CODE- 250: Performed by: FAMILY MEDICINE

## 2018-04-30 PROCEDURE — 77010033678 HC OXYGEN DAILY

## 2018-04-30 PROCEDURE — 74011250637 HC RX REV CODE- 250/637: Performed by: HOSPITALIST

## 2018-04-30 PROCEDURE — 73560 X-RAY EXAM OF KNEE 1 OR 2: CPT

## 2018-04-30 PROCEDURE — 85610 PROTHROMBIN TIME: CPT | Performed by: NURSE PRACTITIONER

## 2018-04-30 PROCEDURE — 83735 ASSAY OF MAGNESIUM: CPT | Performed by: NURSE PRACTITIONER

## 2018-04-30 PROCEDURE — 74011250637 HC RX REV CODE- 250/637: Performed by: FAMILY MEDICINE

## 2018-04-30 PROCEDURE — 80048 BASIC METABOLIC PNL TOTAL CA: CPT | Performed by: NURSE PRACTITIONER

## 2018-04-30 PROCEDURE — 74011250637 HC RX REV CODE- 250/637: Performed by: INTERNAL MEDICINE

## 2018-04-30 PROCEDURE — 36415 COLL VENOUS BLD VENIPUNCTURE: CPT | Performed by: NURSE PRACTITIONER

## 2018-04-30 PROCEDURE — 97166 OT EVAL MOD COMPLEX 45 MIN: CPT

## 2018-04-30 PROCEDURE — 94640 AIRWAY INHALATION TREATMENT: CPT

## 2018-04-30 PROCEDURE — 85025 COMPLETE CBC W/AUTO DIFF WBC: CPT | Performed by: NURSE PRACTITIONER

## 2018-04-30 PROCEDURE — 94760 N-INVAS EAR/PLS OXIMETRY 1: CPT

## 2018-04-30 PROCEDURE — 84100 ASSAY OF PHOSPHORUS: CPT | Performed by: NURSE PRACTITIONER

## 2018-04-30 PROCEDURE — 94762 N-INVAS EAR/PLS OXIMTRY CONT: CPT

## 2018-04-30 RX ORDER — SENNOSIDES 8.6 MG/1
1 TABLET ORAL DAILY
Status: DISCONTINUED | OUTPATIENT
Start: 2018-05-01 | End: 2018-05-02 | Stop reason: HOSPADM

## 2018-04-30 RX ORDER — POLYETHYLENE GLYCOL 3350 17 G/17G
17 POWDER, FOR SOLUTION ORAL DAILY
Status: DISCONTINUED | OUTPATIENT
Start: 2018-04-30 | End: 2018-05-02 | Stop reason: HOSPADM

## 2018-04-30 RX ADMIN — CARVEDILOL 3.12 MG: 3.12 TABLET, FILM COATED ORAL at 10:28

## 2018-04-30 RX ADMIN — BUDESONIDE 500 MCG: 0.5 INHALANT RESPIRATORY (INHALATION) at 07:26

## 2018-04-30 RX ADMIN — WARFARIN SODIUM 2.5 MG: 2 TABLET ORAL at 17:24

## 2018-04-30 RX ADMIN — OXYCODONE HYDROCHLORIDE 10 MG: 5 TABLET ORAL at 07:26

## 2018-04-30 RX ADMIN — OXYCODONE HYDROCHLORIDE 10 MG: 5 TABLET ORAL at 12:38

## 2018-04-30 RX ADMIN — CARVEDILOL 3.12 MG: 3.12 TABLET, FILM COATED ORAL at 17:16

## 2018-04-30 RX ADMIN — ESCITALOPRAM OXALATE 10 MG: 10 TABLET ORAL at 10:28

## 2018-04-30 RX ADMIN — ALBUTEROL SULFATE 2.5 MG: 2.5 SOLUTION RESPIRATORY (INHALATION) at 19:32

## 2018-04-30 RX ADMIN — SIMVASTATIN 20 MG: 20 TABLET, FILM COATED ORAL at 20:33

## 2018-04-30 RX ADMIN — BUDESONIDE 500 MCG: 0.5 INHALANT RESPIRATORY (INHALATION) at 19:32

## 2018-04-30 RX ADMIN — SACUBITRIL AND VALSARTAN 1 TABLET: 24; 26 TABLET, FILM COATED ORAL at 10:28

## 2018-04-30 RX ADMIN — Medication 5 ML: at 05:41

## 2018-04-30 RX ADMIN — Medication 5 ML: at 20:33

## 2018-04-30 RX ADMIN — FUROSEMIDE 80 MG: 40 TABLET ORAL at 17:16

## 2018-04-30 RX ADMIN — POLYETHYLENE GLYCOL (3350) 17 G: 17 POWDER, FOR SOLUTION ORAL at 17:16

## 2018-04-30 RX ADMIN — ALBUTEROL SULFATE 2.5 MG: 2.5 SOLUTION RESPIRATORY (INHALATION) at 07:27

## 2018-04-30 RX ADMIN — FUROSEMIDE 80 MG: 40 TABLET ORAL at 10:28

## 2018-04-30 RX ADMIN — Medication 10 ML: at 17:17

## 2018-04-30 RX ADMIN — SACUBITRIL AND VALSARTAN 1 TABLET: 24; 26 TABLET, FILM COATED ORAL at 17:16

## 2018-04-30 RX ADMIN — ACETAMINOPHEN 650 MG: 325 TABLET ORAL at 20:32

## 2018-04-30 RX ADMIN — ALBUTEROL SULFATE 2.5 MG: 2.5 SOLUTION RESPIRATORY (INHALATION) at 14:45

## 2018-04-30 RX ADMIN — TIOTROPIUM BROMIDE 18 MCG: 18 CAPSULE ORAL; RESPIRATORY (INHALATION) at 07:29

## 2018-04-30 NOTE — PROGRESS NOTES
Hospitalist Progress Note    2018  Admit Date: 2018  7:56 PM   NAME: Kirby Nurse   :  1950   MRN:  [id number]   Attending: Philippe Stephenson MD  PCP:  Amparo Cheng MD    SUBJECTIVE:   Patient 67yo AAF with mechanical aortic valve for which she takes coumadin, presented  after fall at home, missed a step. Found to have left and right hand fractures. At first thought to have possible small subdural hematoma and coumadin was held. Repeat imaging actually shows small SDH was actually artifact. neurosx signed off and coumadin restarted. Ortho is following for hand fractures. She follows Dr Cheikh Nelson for symptomatic anemia and receives monthly procrit.  - reports right hand pain worse this AM. Ortho has seen. Daughter at bedside. Also reporting constipation. Review of Systems negative with exception of pertinent positives noted above  PHYSICAL EXAM     Visit Vitals    /67    Pulse 79    Temp 98.5 °F (36.9 °C)    Resp 18    Ht 5' 3\" (1.6 m)    Wt 100.3 kg (221 lb 3.2 oz)    SpO2 94%    Breastfeeding No    BMI 39.18 kg/m2      Temp (24hrs), Av.9 °F (36.6 °C), Min:97.4 °F (36.3 °C), Max:98.5 °F (36.9 °C)    Oxygen Therapy  O2 Sat (%): 94 % (18 1631)  Pulse via Oximetry: 78 beats per minute (18 1447)  O2 Device: Nasal cannula (18 1447)  O2 Flow Rate (L/min): 3 l/min (18 1447)  FIO2 (%):  (7LPM) (18 0400)    Intake/Output Summary (Last 24 hours) at 18 1642  Last data filed at 18 1246   Gross per 24 hour   Intake            720.5 ml   Output             1250 ml   Net           -529.5 ml      General: No acute distress    Lungs:  CTA Bilaterally. Heart:  Regular rate and rhythm,  No murmur, rub, or gallop  Abdomen: Soft, Non distended, Non tender, Positive bowel sounds  Extremities: Left hand splinted, right hand slightly swolle.    Neurologic:  No focal deficits    ASSESSMENT      Active Hospital Problems    Diagnosis Date Noted    Closed nondisplaced fracture of shaft of fifth metacarpal bone of left hand 04/28/2018    Subdural hematoma ([facility] 04/27/2018    Long term (current) use of anticoagulants 04/19/2018    Type 2 diabetes mellitus with nephropathy ([facility] 12/18/2017    Pulmonary hypertension ([facility] 06/15/2016    S/P AVR (aortic valve replacement)      Mechanical/Artific      Cardiomyopathy ([facility]     HLD (hyperlipidemia)     REJI (obstructive sleep apnea)-cpap 04/02/2014    COPD (chronic obstructive pulmonary disease) ([facility] 04/02/2014    CKD (chronic kidney disease) stage 3, GFR 30-59 ml/min 07/10/2013    Anticoagulated on Coumadin 07/09/2013     S/P AVR      Essential hypertension, benign 01/20/2013    Chronic combined systolic and diastolic heart failure ([facility] 01/20/2013 5/8/14 ECHO:  EF 10-15%  12/2017:  EF 25-30%     A/p  - Right and left hand fractures - ortho following. Plans to splint both. Cont px mgmt. - Constipation - add bowel regimen  - Mechanical aortic valve - coumadin resumed. inr at goal  - CKD - stage 3. Cr up a little today. Encourage oral intake but continue lasix  - Chronic combined systolic/diastolic CHF - continue cont meds. Seemingly compesnated. EF 25%  - Dm2 - BG at goal. Cont current  - Chronic anemia - transfused one unit prbc 4/29 for hgb 7 - now up to 8. Cont to monitor. No active blood loss seen. DVT Prophylaxis: coumadin    Dispo - plans for rehab, CM following. PPD placed.  Pt/ot following    Signed By: Siomara Acevedo, DO     April 30, 2018

## 2018-04-30 NOTE — PROGRESS NOTES
Met with patient and her daughter regarding discharge planning. Patient admitted over the weekend after a mechanical fall at home. She is reportedly able to ambulate without assistance at baseline, but has some right sided weakness in the R upper extremity. Discussed rehab options and patient would like to go to the 9th floor, acute rehab, if possible. She has a home NIV, and so her only other option would be [facility]. Physiatry evaluation requested for possible acute rehab placement. Case Management will continue to follow.     Care Management Interventions  Transition of Care Consult (CM Consult): Discharge Planning  Discharge Durable Medical Equipment: No  Physical Therapy Consult: Yes  Occupational Therapy Consult: Yes  Speech Therapy Consult: No  Current Support Network: Own Home, Family Lives Nearby  Confirm Follow Up Transport: Family  Plan discussed with Pt/Family/Caregiver: Yes  Freedom of Choice Offered: Yes  Discharge Location  Discharge Placement: Rehab hospital/unit acute

## 2018-04-30 NOTE — PROGRESS NOTES
Patient is resting in bed, on O2 n/c, no c/o pain or discomfort @ this time, splint in place to LUE, patient does not voice any needs, call light within reach.

## 2018-04-30 NOTE — PROGRESS NOTES
Pt resting in bed. Pt pulled up repositioned in bed. Pt alert oriented time 3 at this time. Pt complaints of pain 9/10 to bilateral hands. Pt has bruising to left eye. Pt on 2  L: NC at this time. Pt has 2+edema to right hand, hand elevated on pillow at this time. Pt has splint to left hand. Pt has bruising to left thigh and right knee, swelling noted right knee. Pt refuses SCDs at this time. Pt had discolored area to both shins. Pt encouraged to call for assistance if needed call light in reach, door open will monitor.

## 2018-04-30 NOTE — PROGRESS NOTES
Date of Outreach Update:  Vianca Thibodeaux was seen and assessed. Previous Outreach assessment has been reviewed. There have been no significant clinical changes since the completion of the last dated Outreach assessment. Primary, RN at bedside states may place pt on trilogy since pt would like to take a nap. NAD. SPo2 96%.     Signed By: Kelli Youngblood RN     April 30, 2018 5:25 PM

## 2018-04-30 NOTE — PROGRESS NOTES
Roxicodone 2-5 mg tabs po given for complaints of right hand pain 9/10. Pt remains up in chair and tolerating well. Will monitor.

## 2018-04-30 NOTE — PROGRESS NOTES
Date of Outreach Update:  Vianca Mejia was seen and assessed. MEWS Score: 1 (04/29/18 1900)  Vitals:    04/29/18 1705 04/29/18 1900 04/29/18 1934 04/29/18 2300   BP: 112/73 105/63  105/63   Pulse: 66 70  74   Resp: 18 20  20   Temp: 97.6 °F (36.4 °C) 98.1 °F (36.7 °C)  97.4 °F (36.3 °C)   SpO2: 98% 98% 98% 90%   Weight:       Height:             Pain Assessment  Pain Intensity 1: 0 (04/29/18 1905)  Pain Location 1: Generalized  Pain Intervention(s) 1: Medication (see MAR)  Patient Stated Pain Goal: 0      Patient O2 say 80% upon arrival to room while patient sleeping on CPAP with 3L. Increased O2 to 8L, patient wakes easily, denies SOB. RT and primary RN notified. Patient repositioned. O2 sat 95-97%, awake and alert. RT to place patient on continuous pulse ox monitor. Will continue to follow up per outreach protocol.     Signed By:   Heather Cantu RN    April 30, 2018 12:28 AM

## 2018-04-30 NOTE — PROGRESS NOTES
Date of Outreach Update:  Vianca Andrade was seen and assessed. MEWS Score: 1 (04/29/18 1900)  Vitals:    04/29/18 1934 04/29/18 2300 04/30/18 0053 04/30/18 0306   BP:  105/63  137/71   Pulse:  74  73   Resp:  20  20   Temp:  97.4 °F (36.3 °C)  97.4 °F (36.3 °C)   SpO2: 98% 90%  95%   Weight:   100.3 kg (221 lb 3.2 oz)    Height:             Pain Assessment  Pain Intensity 1: 0 (04/29/18 1905)  Pain Location 1: Generalized  Pain Intervention(s) 1: Medication (see MAR)  Patient Stated Pain Goal: 0      Previous Outreach assessment has been reviewed. There have been no significant clinical changes since the completion of the last dated Outreach assessment. Patient sleeping, O2 sat 91% on continuous pulse ox, CPAP w/8L O2. NAD noted at this time. Will continue to follow up per outreach protocol.     Signed By:   Murray Esparza RN    April 30, 2018 3:47 AM

## 2018-04-30 NOTE — PROGRESS NOTES
Pt assisted back to bed and tolerated well. No acute distress noted at this time. Pt on 3 L NC At this time. Call light in reach, door open will monitor.

## 2018-04-30 NOTE — PROGRESS NOTES
Spoke with Dr. Geovani Pitt regarding creatine and po lasix. No new orders at this time. Will monitor.

## 2018-04-30 NOTE — PROGRESS NOTES
Pt up in chair after working with PT/OT. Pt tolerating well at this time. Call light in reach, door open will monitor.

## 2018-04-30 NOTE — PROGRESS NOTES
Visit with patient to build rapport with . Patient is calm. Receptive to  presence. Encouraged and assured patient of our continued care.     Kade Gray,  Staff   C: [phone]  /  [email or web address]

## 2018-04-30 NOTE — PROGRESS NOTES
Sunray to see patient, O2 sat in the 80's, with trilogy in place, RT made aware, patient arousal to verbal stimuli, alert and oriented x4, denies SOB, O2 increased to 8L bled into trilogy, patient repositioned and O2 sat increased to 95%, RT to place continuous pulse ox @ this time.

## 2018-04-30 NOTE — PROGRESS NOTES
Pt returns to room from xray. Pt assisted back to bed. Pt complaints of pain when moving. No acute distress noted at this time. Call light in reach, door open will monitor.

## 2018-04-30 NOTE — PROGRESS NOTES
ORTHO:    DR Payne Members REVIEWED X-RAYS     RECOMMENDED BASILIA TAPING 3RD AND 4TH DIGIT ON RIGHT    NON-SURGICAL    DR STEPHENS TO ROUND LATER TODAY AND SPLINT AND TAPE

## 2018-04-30 NOTE — PROGRESS NOTES
Pt resting in bed with eyes closed. Pt awakens when spoken to. Pt drowsy but appropriate. Fingers on right hand taped together and pt reports comfort. O2 sat 96% at this time. Pt on 3 L NC at this time. Pt encouraged to call for assistance if needed call light in reach, door open will monitor.

## 2018-04-30 NOTE — PROGRESS NOTES
Problem: Self Care Deficits Care Plan (Adult)  Goal: *Acute Goals and Plan of Care (Insert Text)  1. Patient will maintain NWB status to BUE for entire treatment session with no verbal cues from therapist.   2. Patient will complete upper body bathing and dressing with SBA and adaptive dressing techniques as needed. 3. Patient will complete lower body bathing and dressing with minimal assistance and adaptive equipment as needed. 4. Patient will complete toileting with SBA. 5. Patient will tolerate 23 minutes of OT treatment with less than 3 rest breaks to increase activity tolerance for ADLs. 6. Patient will complete functional transfers with supervison and adaptive equipment as needed. Timeframe: 7 visits       Comments:     OCCUPATIONAL THERAPY: Initial Assessment and AM 4/30/2018  INPATIENT: Hospital Day: 4  [place] / [place] / Product Type: Medicare /      NAME/AGE/GENDER: Desiree Yang is a 77 y.o. female   PRIMARY DIAGNOSIS:  Subdural hematoma (HCC) Subdural hematoma (HCC) Subdural hematoma (HCC)        ICD-10: Treatment Diagnosis:    · Generalized Muscle Weakness (M62.81)   Precautions/Allergies:    fall risk, no lifting with B hands Sulfa (sulfonamide antibiotics) and Aspirin      ASSESSMENT:     Ms. Ezra Martin presents to hospital for above. Pt lives alone in a one-level apartment home, where they are typically independent with ADLs/IADLs. No longer driving. She has [facility]TC aide that comes ~20 hours/week. Pt denies use of AD/DME at baseline for functional mobility; pt endorses 1 falls in the last 6 months. Today, pt is found supine in bed upon arrival, AOx4, and agreeable to OT evaluation. Per BUE screen, AROM, strength, and coordination are decreased and non-functional at present secondary to recent fall. Per ortho note, physician is recommending no lifting with either hand/extremity.  Pt able to move supine to sit with min A, demonstrating intact sitting balance at EOB. Pt completes STS with CGA and completes functional transfer with CGA, demonstrating good to fair balance in standing. Pt left seated in chair with possessions in reach and all needs met. Ms. Diaz presents with functional limitations listed below and appears to be functioning below baseline. They will benefit from continued skilled OT services to maximize safety and independence with ADLs. Will follow during acute stay. This section established at most recent assessment   PROBLEM LIST (Impairments causing functional limitations):  1. Decreased Strength  2. Decreased ADL/Functional Activities  3. Decreased Transfer Abilities  4. Decreased Ambulation Ability/Technique  5. Decreased Balance  6. Increased Pain  7. Decreased Activity Tolerance  8. Decreased Work Simplification/Energy Conservation Techniques  9. Decreased Flexibility/Joint Mobility  10. Edema/Girth   INTERVENTIONS PLANNED: (Benefits and precautions of occupational therapy have been discussed with the patient.)  1. Activities of daily living training  2. Adaptive equipment training  3. Balance training  4. Clothing management  5. Donning&doffing training  6. Hygiene training  7. Therapeutic activity  8. Therapeutic exercise     TREATMENT PLAN: Frequency/Duration: Follow patient 3x/week to address above goals. Rehabilitation Potential For Stated Goals: Good     RECOMMENDED REHABILITATION/EQUIPMENT: (at time of discharge pending progress): Due to the probability of continued deficits (see above) this patient will likely need continued skilled occupational therapy after discharge. Equipment:    None at this time              OCCUPATIONAL PROFILE AND HISTORY:   History of Present Injury/Illness (Reason for Referral):  See H&P  Past Medical History/Comorbidities:   Ms. Diaz  has a past medical history of Anticoagulated on Coumadin (7/9/2013); Benign hypertension; CAD (coronary artery disease) (1/20/2013);  Cardiomyopathy ([facility] CHF (congestive heart failure) ([facility] (2017); Chronic left-sided low back pain without sciatica (6/15/2016); Chronic respiratory failure ([facility] (2014); CKD (chronic kidney disease) stage 3, GFR 30-59 ml/min (7/10/2013); COPD (chronic obstructive pulmonary disease) ([facility] (2014); Essential hypertension, benign (2013); HLD (hyperlipidemia); ICD (implantable cardioverter-defibrillator) in place (10/2/2014); Iron deficiency anemia due to chronic blood loss (2009); MDS (myelodysplastic syndrome) ([facility] (2011); REJI (obstructive sleep apnea)-cpap (2014); Osteoarthritis; Osteopenia; Quadrantanopia; Secondary pulmonary hypertension; Tachycardia; and Visual complaint (2015). She also has no past medical history of Contact dermatitis and other eczema, due to unspecified cause; Difficult intubation; Malignant hyperthermia due to anesthesia; Nausea & vomiting; Pseudocholinesterase deficiency; or Unspecified adverse effect of anesthesia. Ms. Anh Benson  has a past surgical history that includes cardiac catheterization (); ir bx bone marrow diagnostic (2011); hx aortic valve replacement (, ); hx  section; hx tubal ligation; hx heart catheterization (); hx coronary stent placement (May, 2014); hx pacemaker (10/2/2014); and hx endoscopy (2009). Social History/Living Environment:   Home Environment: Apartment  # Steps to Enter: 2 (pt states there are other entrances without steps)  One/Two Story Residence: One story  Living Alone: Yes  Support Systems: Child(alysha) (family comes to check on her every day)  Patient Expects to be Discharged to[de-identified] Apartment  Current DME Used/Available at Home:  (pt does not use cane/walker at home)  Prior Level of Function/Work/Activity:  [place] with ADLs  Personal Factors:          Sex:  female        Age:  77 y.o.    Number of Personal Factors/Comorbidities that affect the Plan of Care: Expanded review of therapy/medical records (1-2): MODERATE COMPLEXITY   ASSESSMENT OF OCCUPATIONAL PERFORMANCE[de-identified]   Activities of Daily Living:           Basic ADLs (From Assessment) Complex ADLs (From Assessment)   Feeding: Minimum assistance  Oral Facial Hygiene/Grooming: Moderate assistance  Bathing: Maximum assistance  Upper Body Dressing: Moderate assistance  Lower Body Dressing: Maximum assistance  Toileting: Moderate assistance     Grooming/Bathing/Dressing Activities of Daily Living     Cognitive Retraining  Safety/Judgement: Awareness of environment; Insight into deficits; Fall prevention                       Bed/Mat Mobility  Supine to Sit: Minimum assistance  Sit to Stand: Contact guard assistance  Bed to Chair: Contact guard assistance  Scooting: Minimum assistance       Most Recent Physical Functioning:   Gross Assessment:  AROM: Generally decreased, functional  Strength: Generally decreased, functional  Coordination: Generally decreased, functional               Posture:  Posture (WDL): Exceptions to WDL  Posture Assessment: Cervical, Kyphosis, Forward head, Rounded shoulders, Trunk flexion, Increased  Balance:  Sitting: Intact  Standing: Impaired  Standing - Static: Good  Standing - Dynamic : Fair Bed Mobility:  Supine to Sit: Minimum assistance  Scooting: Minimum assistance  Wheelchair Mobility:     Transfers:  Sit to Stand: Contact guard assistance  Stand to Sit: Contact guard assistance  Bed to Chair: Contact guard assistance                Patient Vitals for the past 6 hrs:   BP BP Patient Position SpO2 O2 Flow Rate (L/min) Pulse   04/30/18 0730 114/47 - 96 % 3 l/min 72   04/30/18 1233 127/69 Sitting 97 % - 77       Mental Status  Neurologic State: Alert  Orientation Level: Oriented X4  Cognition: Follows commands  Perception: Appears intact  Perseveration: No perseveration noted  Safety/Judgement: Awareness of environment, Insight into deficits, Fall prevention                          Physical Skills Involved:  1.  Range of Motion  2. Balance  3. Strength  4. Activity Tolerance  5. Vision  6. Pain (acute)  7. Edema Cognitive Skills Affected (resulting in the inability to perform in a timely and safe manner):  1. n/a Psychosocial Skills Affected:  1. Habits/Routines  2. Environmental Adaptation  3. Social Roles   Number of elements that affect the Plan of Care: 5+:  HIGH COMPLEXITY   CLINICAL DECISION MAKIN[address] [place] [address] AM-PAC 6 Clicks   Daily Activity Inpatient Short Form  How much help from another person does the patient currently need. .. Total A Lot A Little None   1. Putting on and taking off regular lower body clothing? [] 1   [x] 2   [] 3   [] 4   2. Bathing (including washing, rinsing, drying)? [] 1   [x] 2   [] 3   [] 4   3. Toileting, which includes using toilet, bedpan or urinal?   [] 1   [x] 2   [] 3   [] 4   4. Putting on and taking off regular upper body clothing? [] 1   [x] 2   [] 3   [] 4   5. Taking care of personal grooming such as brushing teeth? [] 1   [x] 2   [] 3   [] 4   6. Eating meals? [] 1   [] 2   [x] 3   [] 4   © , Trustees of [address] [place] [address], under license to [employer]. All rights reserved      Score:  Initial: 13 Most Recent: X (Date: -- )    Interpretation of Tool:  Represents activities that are increasingly more difficult (i.e. Bed mobility, Transfers, Gait). Score 24 23 22-20 19-15 14-10 9-7 6     Modifier CH CI CJ CK CL CM CN      ? Self Care:     - CURRENT STATUS: CL - 60%-79% impaired, limited or restricted    - GOAL STATUS: CK - 40%-59% impaired, limited or restricted    - D/C STATUS:  ---------------To be determined---------------  [place] / [place] / Product Type: Medicare /      Medical Necessity:     · Patient is expected to demonstrate progress in strength, range of motion, balance, coordination and functional technique to increase independence with ADLs.   Reason for Services/Other Comments:  · Patient continues to require skilled intervention due to patient unable to attend/participate in therapy as expected. Use of outcome tool(s) and clinical judgement create a POC that gives a: MODERATE COMPLEXITY         TREATMENT:   (In addition to Assessment/Re-Assessment sessions the following treatments were rendered)     Pre-treatment Symptoms/Complaints:    Pain: Initial:   Pain Intensity 1: 0  Post Session:  same     Assessment/Reassessment only, no treatment provided today    Braces/Orthotics/Lines/Etc:   · pure wick  · O2 Device: Nasal cannula  Treatment/Session Assessment:    · Response to Treatment:  eval only   · Interdisciplinary Collaboration:   o Occupational Therapist  o Registered Nurse  o Rehabilitation Attendant  · After treatment position/precautions:   o Up in chair  o Bed alarm/tab alert on  o Bed/Chair-wheels locked  o Call light within reach  o RN notified  o Family at bedside   · Compliance with Program/Exercises: compliant all of the time. · Recommendations/Intent for next treatment session: \"Next visit will focus on advancements to more challenging activities and reduction in assistance provided\".   Total Treatment Duration:  OT Patient Time In/Time Out  Time In: 1100  Time Out: [address]

## 2018-04-30 NOTE — PROGRESS NOTES
Jaleesa Hopson CRITICAL CARE OUTREACH NURSE PROGRESS REPORT      SUBJECTIVE: Called to assess patient secondary to critical care outreach protocol. MEWS Score: 1 (04/30/18 0730)  Vitals:    04/30/18 0306 04/30/18 0400 04/30/18 0730 04/30/18 1233   BP: 137/71  114/47 127/69   Pulse: 73 67 72 77   Resp: 20 20 18 18   Temp: 97.4 °F (36.3 °C)  98.2 °F (36.8 °C) 98.1 °F (36.7 °C)   SpO2: 95% 95% 96% 97%   Weight:       Height:          EKG: normal EKG, normal sinus rhythm, unchanged from previous tracings. LAB DATA:    Recent Labs      04/30/18   0636  04/29/18   0642  04/28/18   0430 04/27/18 2002   NA  140  140  142  140   K  4.6  4.2  4.7  5.1   CL  97*  96*  98  96*   CO2  38*  40*  39*  41*   AGAP  5*  4*  5*  3*   GLU  121*  91  90  195*   BUN  49*  39*  49*  45*   CREA  2.32*  1.65*  2.09*  2.03*   GFRAA  27*  40*  30*  31*   GFRNA  22*  33*  25*  26*   CA  8.6  8.8  9.9  10.3   MG  2.4   --    --    --    PHOS  4.3*   --    --    --    ALB   --    --    --   3.7   TP   --    --    --   7.4   GLOB   --    --    --   3.7*   AGRAT   --    --    --   1.0*   ALT   --    --    --   37        Recent Labs      04/30/18 0636 04/29/18 0642 04/28/18 0430   WBC  7.3  8.6  7.4   HGB  8.0*  7.0*  7.4*   HCT  26.1*  22.6*  23.8*   PLT  185  171  183          OBJECTIVE: On arrival to room, I found patient to be in chair resting quietly, primary RN at bedside. Pain Assessment  Pain Intensity 1: 9 (04/30/18 1238)  Pain Location 1: Hand  Pain Intervention(s) 1: Medication (see MAR)  Patient Stated Pain Goal: 0    Pain Intensity 2: 6 (04/29/18 0832)  Pain Location 2: Knee  Pain Intervention(s) 2: Rest, Repositioned                      ASSESSMENT:  Pt A/O x 4. Denies pain at this time. SPo2 95% on 3L NC. Lung sounds CTA. Abdomen soft, non tender. Left eye hemorhage with periorbital swelling. PERRLA, 3 mm. NSR on remote tele. BP WDL. Afebrile. HR 70. Left hand with fx, swelling, ecchymosis.  Wrap dressing c/d/i. Right hand with swelling as well, extremely tender with limited movement. Right knee with pain/swelling. Pt states she has not been able to stand up much. Seeing ortho surg. NAD. Able to communicate needs. PLAN:  Will continue to follow per outreach protocol.

## 2018-04-30 NOTE — PROGRESS NOTES
Patient has rested quietly throughout night on trilogy, continuous pulse Ox in place, denies needs @ this time, call light within reach.

## 2018-05-01 LAB
ANION GAP SERPL CALC-SCNC: 6 MMOL/L (ref 7–16)
BUN SERPL-MCNC: 55 MG/DL (ref 8–23)
CALCIUM SERPL-MCNC: 8.4 MG/DL (ref 8.3–10.4)
CHLORIDE SERPL-SCNC: 97 MMOL/L (ref 98–107)
CO2 SERPL-SCNC: 38 MMOL/L (ref 21–32)
CREAT SERPL-MCNC: 2.17 MG/DL (ref 0.6–1)
ERYTHROCYTE [DISTWIDTH] IN BLOOD BY AUTOMATED COUNT: 14 % (ref 11.9–14.6)
GLUCOSE SERPL-MCNC: 114 MG/DL (ref 65–100)
HCT VFR BLD AUTO: 25.1 % (ref 35.8–46.3)
HGB BLD-MCNC: 7.9 G/DL (ref 11.7–15.4)
INR PPP: 2.9
MCH RBC QN AUTO: 29.3 PG (ref 26.1–32.9)
MCHC RBC AUTO-ENTMCNC: 31.5 G/DL (ref 31.4–35)
MCV RBC AUTO: 93 FL (ref 79.6–97.8)
MM INDURATION POC: 0 MM (ref 0–5)
PLATELET # BLD AUTO: 189 K/UL (ref 150–450)
PMV BLD AUTO: 10.8 FL (ref 10.8–14.1)
POTASSIUM SERPL-SCNC: 4.7 MMOL/L (ref 3.5–5.1)
PPD POC: NORMAL NEGATIVE
PROTHROMBIN TIME: 30 SEC (ref 11.5–14.5)
RBC # BLD AUTO: 2.7 M/UL (ref 4.05–5.25)
SODIUM SERPL-SCNC: 141 MMOL/L (ref 136–145)
WBC # BLD AUTO: 6.9 K/UL (ref 4.3–11.1)

## 2018-05-01 PROCEDURE — 85610 PROTHROMBIN TIME: CPT | Performed by: NURSE PRACTITIONER

## 2018-05-01 PROCEDURE — 94640 AIRWAY INHALATION TREATMENT: CPT

## 2018-05-01 PROCEDURE — 94762 N-INVAS EAR/PLS OXIMTRY CONT: CPT

## 2018-05-01 PROCEDURE — 74011250637 HC RX REV CODE- 250/637: Performed by: FAMILY MEDICINE

## 2018-05-01 PROCEDURE — 74011250637 HC RX REV CODE- 250/637: Performed by: INTERNAL MEDICINE

## 2018-05-01 PROCEDURE — 74011250637 HC RX REV CODE- 250/637: Performed by: HOSPITALIST

## 2018-05-01 PROCEDURE — 85027 COMPLETE CBC AUTOMATED: CPT | Performed by: NURSE PRACTITIONER

## 2018-05-01 PROCEDURE — 65660000000 HC RM CCU STEPDOWN

## 2018-05-01 PROCEDURE — 80048 BASIC METABOLIC PNL TOTAL CA: CPT | Performed by: NURSE PRACTITIONER

## 2018-05-01 PROCEDURE — 99221 1ST HOSP IP/OBS SF/LOW 40: CPT | Performed by: PHYSICAL MEDICINE & REHABILITATION

## 2018-05-01 PROCEDURE — 77010033678 HC OXYGEN DAILY

## 2018-05-01 PROCEDURE — 94760 N-INVAS EAR/PLS OXIMETRY 1: CPT

## 2018-05-01 PROCEDURE — 97530 THERAPEUTIC ACTIVITIES: CPT

## 2018-05-01 PROCEDURE — 36415 COLL VENOUS BLD VENIPUNCTURE: CPT | Performed by: NURSE PRACTITIONER

## 2018-05-01 PROCEDURE — 74011000250 HC RX REV CODE- 250: Performed by: FAMILY MEDICINE

## 2018-05-01 RX ORDER — FACIAL-BODY WIPES
10 EACH TOPICAL DAILY PRN
Status: DISCONTINUED | OUTPATIENT
Start: 2018-05-01 | End: 2018-05-02 | Stop reason: HOSPADM

## 2018-05-01 RX ADMIN — OXYCODONE HYDROCHLORIDE 10 MG: 5 TABLET ORAL at 02:53

## 2018-05-01 RX ADMIN — CARVEDILOL 3.12 MG: 3.12 TABLET, FILM COATED ORAL at 08:46

## 2018-05-01 RX ADMIN — FUROSEMIDE 80 MG: 40 TABLET ORAL at 17:00

## 2018-05-01 RX ADMIN — POLYETHYLENE GLYCOL (3350) 17 G: 17 POWDER, FOR SOLUTION ORAL at 08:45

## 2018-05-01 RX ADMIN — BUDESONIDE 500 MCG: 0.5 INHALANT RESPIRATORY (INHALATION) at 07:31

## 2018-05-01 RX ADMIN — ESCITALOPRAM OXALATE 10 MG: 10 TABLET ORAL at 08:45

## 2018-05-01 RX ADMIN — TIOTROPIUM BROMIDE 18 MCG: 18 CAPSULE ORAL; RESPIRATORY (INHALATION) at 14:55

## 2018-05-01 RX ADMIN — SENNOSIDES 8.6 MG: 8.6 TABLET, FILM COATED ORAL at 08:45

## 2018-05-01 RX ADMIN — BUDESONIDE 500 MCG: 0.5 INHALANT RESPIRATORY (INHALATION) at 20:00

## 2018-05-01 RX ADMIN — ALBUTEROL SULFATE 2.5 MG: 2.5 SOLUTION RESPIRATORY (INHALATION) at 07:35

## 2018-05-01 RX ADMIN — OXYCODONE HYDROCHLORIDE 10 MG: 5 TABLET ORAL at 14:17

## 2018-05-01 RX ADMIN — SACUBITRIL AND VALSARTAN 1 TABLET: 24; 26 TABLET, FILM COATED ORAL at 08:46

## 2018-05-01 RX ADMIN — WARFARIN SODIUM 5 MG: 5 TABLET ORAL at 17:00

## 2018-05-01 RX ADMIN — SACUBITRIL AND VALSARTAN 1 TABLET: 24; 26 TABLET, FILM COATED ORAL at 17:00

## 2018-05-01 RX ADMIN — CARVEDILOL 3.12 MG: 3.12 TABLET, FILM COATED ORAL at 17:00

## 2018-05-01 RX ADMIN — ALBUTEROL SULFATE 2.5 MG: 2.5 SOLUTION RESPIRATORY (INHALATION) at 20:00

## 2018-05-01 RX ADMIN — Medication 10 ML: at 21:42

## 2018-05-01 RX ADMIN — ALBUTEROL SULFATE 2.5 MG: 2.5 SOLUTION RESPIRATORY (INHALATION) at 14:54

## 2018-05-01 RX ADMIN — Medication 10 ML: at 11:10

## 2018-05-01 RX ADMIN — Medication 5 ML: at 06:00

## 2018-05-01 RX ADMIN — FUROSEMIDE 80 MG: 40 TABLET ORAL at 08:46

## 2018-05-01 RX ADMIN — SIMVASTATIN 20 MG: 20 TABLET, FILM COATED ORAL at 21:41

## 2018-05-01 RX ADMIN — ACETAMINOPHEN 650 MG: 325 TABLET ORAL at 07:23

## 2018-05-01 RX ADMIN — BISACODYL 10 MG: 10 SUPPOSITORY RECTAL at 21:41

## 2018-05-01 NOTE — CONSULTS
PM&R Rehab Consult    Subjective:     Date of Consultation:  May 1, 2018    Referring Physician: Dr. Emelina Schultz    Patient is a 77 y.o. female who is being seen for rehab recommendations s/p fall with multiple medical comorbidities, now with bilateral hand fractures and right knee injury with debility    Principal Problem:    Subdural hematoma ([address] (4/27/2018)    Active Problems:    Chronic combined systolic and diastolic heart failure ([address] (1/20/2013)      Overview: 5/8/14 ECHO:  EF 10-15%      12/2017:  EF 25-30%      Essential hypertension, benign (1/20/2013)      Anticoagulated on Coumadin (7/9/2013)      Overview: S/P AVR      CKD (chronic kidney disease) stage 3, GFR 30-59 ml/min (7/10/2013)      COPD (chronic obstructive pulmonary disease) ([address] (4/2/2014)      REJI (obstructive sleep apnea)-cpap (4/2/2014)      HLD (hyperlipidemia) ()      S/P AVR (aortic valve replacement) ()      Overview: Mechanical/Artific      Cardiomyopathy ([address] ()      Pulmonary hypertension ([address] (6/15/2016)      Type 2 diabetes mellitus with nephropathy ([address] (12/18/2017)      Long term (current) use of anticoagulants (4/19/2018)      Closed nondisplaced fracture of shaft of fifth metacarpal bone of left hand (4/28/2018)    HPI; Ms. Brea Carlos is a normally functionally independent, ambidextrous 65yo AA femal with hx of chronic coumadin therapy due to a mechanical aortic valve placement in 2013, CAD s/p PCI and stent to LAD in 2013, CKD, morbid obesity, Htn, COPD, REJI onTRILOGY at [facility] secondary pulmonology, OA and  MDS with chronic anemia followed by oncology, Dr Karolina Dos Santos, and received procrit manuel,  who was admitted thru the ED on 4/27 after she tripped entering the two steps into her home. She landed on her hands and knees. Head CT initially questioned the presence of a SDH but was later determined to be artifact.    Xrays of the left hand showed an oblique fracture the distal fifth metacarpal metaphysis and an avulsion fracture at the base of the third proximal phalanx. Xray of the right hand revealed dorsal soft tissue swelling with a distal 5th proximal phalanx fracture. She has been seen by Ortho and her left hand is splinted and her last two digits of her right hand are rachel taped. She is not to lift anything with either hand.    xrays of the knees were negative but she has noted bruising, abrasion and swelling of the right knee. She has ecchymosis around the left eye with injected sclera. Her hgb on presentation was 8.6, but dropped to 7.4 on 4/28  And then 7.0 and she received a blood transfusion. Her hgb is currently 7.9. Her  Creatinine has leveled out and her GFR 29. Past Medical History:   Diagnosis Date    Anticoagulated on Coumadin 7/9/2013    S/P AVR     Benign hypertension     controlled    CAD (coronary artery disease) 1/20/2013 5/8/14 PCI LAD with stent placed     Cardiomyopathy ([address]     CHF (congestive heart failure) ([address] 2/17/2017    Chronic left-sided low back pain without sciatica 6/15/2016    Chronic respiratory failure ([address] 4/2/2014    CKD (chronic kidney disease) stage 3, GFR 30-59 ml/min 7/10/2013    COPD (chronic obstructive pulmonary disease) ([address] 4/2/2014    Essential hypertension, benign 1/20/2013    HLD (hyperlipidemia)     ICD (implantable cardioverter-defibrillator) in place 10/2/2014    Biotronik single-chamber ICD implantation 10/20/14     Iron deficiency anemia due to chronic blood loss 7/29/2009    MDS (myelodysplastic syndrome) ([address] 12/17/2011    Procrit started in August, 2011 12/18/11 Procrit weekly and Iron stores. 5-12 12-13-12 good response to 3 weekly procrit did not need it last time  3-7-13 Pt doing well. Just wanted a \"check-up. \" Responding to Procrit every three weeks.  8-29-13 patient has missed some injections on recently restarted hemoglobin only issue , takes oral iron iron stores each time       REJI (obstructive sleep apnea)-cpap 4/2/2014    Osteoarthritis  Osteopenia     Quadrantanopia     Secondary pulmonary hypertension     Tachycardia     Rapid H/B     Visual complaint 2015      Family History   Problem Relation Age of Onset    Heart Disease Mother      CHF    Hypertension Mother     Kidney Disease Mother     Heart Disease Father      CHF    Lung Disease Father     Diabetes Father     Cancer Father      prostate    Hypertension Father     Heart Attack Father     Heart Disease Maternal Aunt     Diabetes Brother     Coronary Artery Disease Other     Breast Cancer Neg Hx       Social History   Substance Use Topics    Smoking status: Former Smoker     Packs/day: 0.25     Years: 42.00     Types: Cigarettes     Start date: 10/1/1956     Quit date: 2014    Smokeless tobacco: Never Used    Alcohol use No     Past Surgical History:   Procedure Laterality Date    CARDIAC CATHETERIZATION      HX AORTIC VALVE REPLACEMENT  2005    mechanical valve     HX  SECTION      HX CORONARY STENT PLACEMENT  May, 2014    STEMI with Stent placement.  HX ENDOSCOPY  2009    EGD    HX HEART CATHETERIZATION      HX PACEMAKER  10/2/2014    Biotronik ICD    HX TUBAL LIGATION      IR BX BONE MARROW DIAGNOSTIC  2011      Prior to Admission medications    Medication Sig Start Date End Date Taking? Authorizing Provider   simvastatin (ZOCOR) 20 mg tablet TAKE 1 TABLET BY MOUTH EVERY DAY 18   Renetta Lares MD   escitalopram oxalate (LEXAPRO) 10 mg tablet TAKE 1 TAB BY MOUTH DAILY. INDICATIONS: ANXIETY WITH DEPRESSION 18   Renetta Lares MD   traMADol (ULTRAM) 50 mg tablet TAKE 1 TABLET BY MOUTH EVERY 4 HOURS AS NEEDED 18   Renetta Lares MD   isosorbide mononitrate ER (IMDUR) 30 mg tablet TAKE 1 TAB BY MOUTH EVERY MORNING. 18   Renetta Lares MD   carvedilol (COREG) 6.25 mg tablet TAKE 1 TAB BY MOUTH TWO (2) TIMES DAILY (WITH MEALS).  INDICATIONS: HYPERTENSION 18   Renetta Lares MD furosemide (LASIX) 40 mg tablet Take 2 Tabs by mouth two (2) times a day. 4/5/18   Brianne Orosco MD   ferrous gluconate 324 mg (38 mg iron) tablet TAKE 1 TAB BY MOUTH DAILY (BEFORE BREAKFAST). INDICATIONS: IRON DEFICIENCY ANEMIA  Patient taking differently: TAKE 1 TAB BY MOUTH DAILY (BEFORE BREAKFAST). INDICATIONS: IRON DEFICIENCY ANEMIA  Takes twice a day 4/5/18   Brianne Orosco MD   fluticasone (FLONASE) 50 mcg/actuation nasal spray 2 Sprays by Both Nostrils route daily as needed. 3/7/18   Brianne Orosco MD   umeclidinium (INCRUSE ELLIPTA) 62.5 mcg/actuation inhaler Take 1 Puff by inhalation daily. Indications: j44.9 2/27/18   Brandi Bella NP   warfarin (COUMADIN) 5 mg tablet Take 1 Tab by mouth daily. 2/26/18   Rashawn Ram MD   sacubitril-valsartan (ENTRESTO) 24 mg/26 mg tablet Take 1 Tab by mouth two (2) times a day. INDICATIONS: CHRONIC HEART FAILURE 2/26/18   Rashawn Ram MD   loratadine (CLARITIN) 10 mg tablet TAKE 1 TAB BY MOUTH DAILY. Patient taking differently: TAKE 1 TAB BY MOUTH DAILY PRN 1/2/18   CAROLINA Hook   mometasone-formoterol (DULERA) 200-5 mcg/actuation HFA inhaler 2 puffs bid, rinse mouth after use. 8/30/17   Brandi Bella NP   albuterol (PROVENTIL VENTOLIN) 2.5 mg /3 mL (0.083 %) nebulizer solution 3 mL by Nebulization route every four (4) hours as needed for Wheezing. 8/30/17   Brandi Bella NP   albuterol (VENTOLIN HFA) 90 mcg/actuation inhaler 2 puffs qid prn 8/30/17   Brandi Bella NP   cholecalciferol, VITAMIN D3, (VITAMIN D3) 5,000 unit tab tablet Take 1 Tab by mouth daily. 6/15/16   Brianne Orosco MD   acetaminophen (TYLENOL) 325 mg tablet Take 650 mg by mouth every four (4) hours as needed for Pain. Historical Provider   OXYGEN-AIR DELIVERY SYSTEMS 3 L by Nasal route continuous. Historical Provider   nitroglycerin (NITROSTAT) 0.4 mg SL tablet 0.4 mg by SubLINGual route every five (5) minutes as needed.     Historical Provider   aspirin delayed-release (ASPIR-81) 81 mg tablet Take 81 mg by mouth every morning. Historical Provider     Allergies   Allergen Reactions    Sulfa (Sulfonamide Antibiotics) Hives    Aspirin Nausea Only     Cannot take uncoated aspirin        Review of Systems:  A comprehensive review of systems was negative except for: Constitutional: positive for fatigue  Eyes: positive for irritation  Ears, nose, mouth, throat, and face: positive for hearing loss, nasal congestion, snoring and facial trauma  Respiratory: positive for asthma, dyspnea on exertion, emphysema or REJI  Cardiovascular: positive for dyspnea, palpitations, fatigue, orthopnea, lower extremity edema, dyspnea on exertion  Gastrointestinal: positive for reflux symptoms and constipation  Genitourinary: positive for frequency and nocturia  Hematologic/lymphatic: positive for easy bruising  Musculoskeletal: positive for myalgias, arthralgias, stiff joints, back pain and muscle weakness  Neurological: positive for weakness    Objective:     Vitals:  Blood pressure 140/84, pulse 72, temperature 98.1 °F (36.7 °C), resp. rate 18, height 5' 3\" (1.6 m), weight 222 lb 6.4 oz (100.9 kg), SpO2 96 %, not currently breastfeeding. Temp (24hrs), Av.1 °F (36.7 °C), Min:97.6 °F (36.4 °C), Max:98.5 °F (36.9 °C)      Intake and Output:   1901 -  0700  In: 5 [P.O.:720]  Out: 1500 [Urine:1500]    Physical Exam:  General:  Alert, oriented and mood affect appropriate; NAD, morbidly obese   Lungs:   Clear to auscultation bilaterally. Heart:  Regular rate and rhythm, S1, S2 stable, no murmur, click, rub or gallop. Abdomen:   Soft, non-tender. Bowel sounds present. No masses,  No organomegaly. obese   Genitourinary: defer   Neuro Muscular: Prox> distal weakness. Pain with knee flexion on the right, lacks about 15d extension; hip flexion 2+ on the right , 3- on the left   Skin:  No rashes, lesions, or signs/symptoms or infection.  Bruises notes, ecchymosis /edema right eye. Right hand mild edema, rachel taped last 4th/5th digits, splint left hand       Labs/Studies:  Recent Results (from the past 72 hour(s))   PROTHROMBIN TIME + INR    Collection Time: 04/29/18  6:42 AM   Result Value Ref Range    Prothrombin time 25.7 (H) 11.5 - 14.5 sec    INR 2.4     METABOLIC PANEL, BASIC    Collection Time: 04/29/18  6:42 AM   Result Value Ref Range    Sodium 140 136 - 145 mmol/L    Potassium 4.2 3.5 - 5.1 mmol/L    Chloride 96 (L) 98 - 107 mmol/L    CO2 40 (H) 21 - 32 mmol/L    Anion gap 4 (L) 7 - 16 mmol/L    Glucose 91 65 - 100 mg/dL    BUN 39 (H) 8 - 23 MG/DL    Creatinine 1.65 (H) 0.6 - 1.0 MG/DL    GFR est AA 40 (L) >60 ml/min/1.73m2    GFR est non-AA 33 (L) >60 ml/min/1.73m2    Calcium 8.8 8.3 - 10.4 MG/DL   CBC WITH AUTOMATED DIFF    Collection Time: 04/29/18  6:42 AM   Result Value Ref Range    WBC 8.6 4.3 - 11.1 K/uL    RBC 2.39 (L) 4.05 - 5.25 M/uL    HGB 7.0 (L) 11.7 - 15.4 g/dL    HCT 22.6 (L) 35.8 - 46.3 %    MCV 94.6 79.6 - 97.8 FL    MCH 29.3 26.1 - 32.9 PG    MCHC 31.0 (L) 31.4 - 35.0 g/dL    RDW 13.8 11.9 - 14.6 %    PLATELET 628 294 - 371 K/uL    MPV 11.2 10.8 - 14.1 FL    DF AUTOMATED      NEUTROPHILS 68 43 - 78 %    LYMPHOCYTES 25 13 - 44 %    MONOCYTES 5 4.0 - 12.0 %    EOSINOPHILS 2 0.5 - 7.8 %    BASOPHILS 0 0.0 - 2.0 %    IMMATURE GRANULOCYTES 0 0.0 - 5.0 %    ABS. NEUTROPHILS 5.8 1.7 - 8.2 K/UL    ABS. LYMPHOCYTES 2.2 0.5 - 4.6 K/UL    ABS. MONOCYTES 0.4 0.1 - 1.3 K/UL    ABS. EOSINOPHILS 0.2 0.0 - 0.8 K/UL    ABS. BASOPHILS 0.0 0.0 - 0.2 K/UL    ABS. IMM.  GRANS. 0.0 0.0 - 0.5 K/UL   PLEASE READ & DOCUMENT PPD TEST IN 24 HRS    Collection Time: 04/29/18 10:40 AM   Result Value Ref Range    PPD  Negative    mm Induration 0 mm   PLEASE READ & DOCUMENT PPD TEST IN 48 HRS    Collection Time: 04/29/18 10:40 AM   Result Value Ref Range    PPD  Negative    mm Induration 0 mm   PROTHROMBIN TIME + INR    Collection Time: 04/30/18  6:36 AM   Result Value Ref Range Prothrombin time 27.6 (H) 11.5 - 14.5 sec    INR 2.6     METABOLIC PANEL, BASIC    Collection Time: 04/30/18  6:36 AM   Result Value Ref Range    Sodium 140 136 - 145 mmol/L    Potassium 4.6 3.5 - 5.1 mmol/L    Chloride 97 (L) 98 - 107 mmol/L    CO2 38 (H) 21 - 32 mmol/L    Anion gap 5 (L) 7 - 16 mmol/L    Glucose 121 (H) 65 - 100 mg/dL    BUN 49 (H) 8 - 23 MG/DL    Creatinine 2.32 (H) 0.6 - 1.0 MG/DL    GFR est AA 27 (L) >60 ml/min/1.73m2    GFR est non-AA 22 (L) >60 ml/min/1.73m2    Calcium 8.6 8.3 - 10.4 MG/DL   CBC WITH AUTOMATED DIFF    Collection Time: 04/30/18  6:36 AM   Result Value Ref Range    WBC 7.3 4.3 - 11.1 K/uL    RBC 2.79 (L) 4.05 - 5.25 M/uL    HGB 8.0 (L) 11.7 - 15.4 g/dL    HCT 26.1 (L) 35.8 - 46.3 %    MCV 93.5 79.6 - 97.8 FL    MCH 28.7 26.1 - 32.9 PG    MCHC 30.7 (L) 31.4 - 35.0 g/dL    RDW 14.4 11.9 - 14.6 %    PLATELET 992 502 - 350 K/uL    MPV 10.8 10.8 - 14.1 FL    DF AUTOMATED      NEUTROPHILS 65 43 - 78 %    LYMPHOCYTES 25 13 - 44 %    MONOCYTES 7 4.0 - 12.0 %    EOSINOPHILS 3 0.5 - 7.8 %    BASOPHILS 0 0.0 - 2.0 %    IMMATURE GRANULOCYTES 0 0.0 - 5.0 %    ABS. NEUTROPHILS 4.7 1.7 - 8.2 K/UL    ABS. LYMPHOCYTES 1.8 0.5 - 4.6 K/UL    ABS. MONOCYTES 0.5 0.1 - 1.3 K/UL    ABS. EOSINOPHILS 0.2 0.0 - 0.8 K/UL    ABS. BASOPHILS 0.0 0.0 - 0.2 K/UL    ABS. IMM.  GRANS. 0.0 0.0 - 0.5 K/UL   MAGNESIUM    Collection Time: 04/30/18  6:36 AM   Result Value Ref Range    Magnesium 2.4 1.8 - 2.4 mg/dL   PHOSPHORUS    Collection Time: 04/30/18  6:36 AM   Result Value Ref Range    Phosphorus 4.3 (H) 2.3 - 3.7 MG/DL   PROTHROMBIN TIME + INR    Collection Time: 05/01/18  6:45 AM   Result Value Ref Range    Prothrombin time 30.0 (H) 11.5 - 14.5 sec    INR 2.9     METABOLIC PANEL, BASIC    Collection Time: 05/01/18  6:45 AM   Result Value Ref Range    Sodium 141 136 - 145 mmol/L    Potassium 4.7 3.5 - 5.1 mmol/L    Chloride 97 (L) 98 - 107 mmol/L    CO2 38 (H) 21 - 32 mmol/L    Anion gap 6 (L) 7 - 16 mmol/L Glucose 114 (H) 65 - 100 mg/dL    BUN 55 (H) 8 - 23 MG/DL    Creatinine 2.17 (H) 0.6 - 1.0 MG/DL    GFR est AA 29 (L) >60 ml/min/1.73m2    GFR est non-AA 24 (L) >60 ml/min/1.73m2    Calcium 8.4 8.3 - 10.4 MG/DL   CBC W/O DIFF    Collection Time: 05/01/18  6:45 AM   Result Value Ref Range    WBC 6.9 4.3 - 11.1 K/uL    RBC 2.70 (L) 4.05 - 5.25 M/uL    HGB 7.9 (L) 11.7 - 15.4 g/dL    HCT 25.1 (L) 35.8 - 46.3 %    MCV 93.0 79.6 - 97.8 FL    MCH 29.3 26.1 - 32.9 PG    MCHC 31.5 31.4 - 35.0 g/dL    RDW 14.0 11.9 - 14.6 %    PLATELET 266 330 - 629 K/uL    MPV 10.8 10.8 - 14.1 FL         Functional Assessment:  Functional Assessment  Fall in Past 12 Months: Yes  Fall With Injury: Yes (Describe)  Number of Falls Within 12 Months: 2  Approximate Date of Fall: 4/27/18  Decline in Gait/Transfer/Balance: No  Decline in Capacity to Feed/Dress/Bathe: Yes (comment)  Developmental Delay: No  Chewing/Swallowing Problems: No  Difficulty with Secretions: No  Speech Slurred/Thick/Garbled: No     Callahan Score:        Speech Assessment:    Dysphagia Screening  Vocal Quality/Secretions: Normal  History of Dysphagia: No  O2 Saturation: Normal  Alertness: Normal  Pre-Swallow Assessment Score: 0  Purees: No difficulty noted  Water by Cup: No difficulty noted  Water by Straw: No difficulty noted                Ambulation:  Activity and Safety  Activity Level: Bath Room Privileges, Up with Assistance  Ambulate: Ambulate to bathroom  Activity: In bed  Activity Assistance: Complete care  Weight Bearing Status: WBAT (Weight Bearing as Tolerated)  Mode of Transportation: Oxygen, Ambulatory  Repositioned: Head of bed elevated (degrees)  Patient Turned: Turns self  Head of Bed Elevated: Self regulated  Activity Response:  Tolerated well  Assistive Device: Fall prevention device  Safety Measures: Bed/Chair-Wheels locked, Bed in low position, Call light within reach, Side rails X2  Venipuncture/BP Precautions: Venipuncture/BP precautions LUE Impression/Plan:     Principal Problem:    Subdural hematoma ([address] (4/27/2018)    Active Problems:    Chronic combined systolic and diastolic heart failure ([address] (1/20/2013)      Overview: 5/8/14 ECHO:  EF 10-15%      12/2017:  EF 25-30%      Essential hypertension, benign (1/20/2013)      Anticoagulated on Coumadin (7/9/2013)      Overview: S/P AVR      CKD (chronic kidney disease) stage 3, GFR 30-59 ml/min (7/10/2013)      COPD (chronic obstructive pulmonary disease) ([address] (4/2/2014)      REJI (obstructive sleep apnea)-cpap (4/2/2014)      HLD (hyperlipidemia) ()      S/P AVR (aortic valve replacement) ()      Overview: Mechanical/Artific      Cardiomyopathy ([facility] ()      Pulmonary hypertension ([facility] (6/15/2016)      Type 2 diabetes mellitus with nephropathy ([facility] (12/18/2017)      Long term (current) use of anticoagulants (4/19/2018)      Closed nondisplaced fracture of shaft of fifth metacarpal bone of left hand (4/28/2018)     s/p traumatic fall with bilateral hand fracture, right knee injury, acute on chronic anemia requiring blood transfusion, debilitation due to pain and multiple medical comorbidities    Recommendations: Continue Acute Rehab Program  Coordination of rehab/medical care  Counseling of PM & R care issues management  Monitoring and management of medical conditions per plan of care/orders   -will likely need closer supervision at discharge. This was d/w dtg  -appropriate for [facility] due to need for two therapies, close medical monitoring due to pain, increased risk of bleeding due to coumadin, recent blood transfusion, increased r/o DVT due to immob, chronic pulm htn, COPD and REJI; on [facility] at [facility] advanced heart dz with EF of 10-25 % at risk for decompensation  -[facility] pending medical clearance. Thinking 5/2?  If ok with Hospitalists  Discussion with Family/Caregiver/Staff  Reviewed Therapies/Labs/Meds/Records    Signed By:  Racheal Minaya MD     May 1, 2018

## 2018-05-01 NOTE — PROGRESS NOTES
Problem: Mobility Impaired (Adult and Pediatric)  Goal: *Acute Goals and Plan of Care (Insert Text)  LTG:  (1.)Ms. Brea Carlos will move from supine to sit and sit to supine , scoot up and down and roll side to side in bed with INDEPENDENCE within 4-7 treatment day(s). (2.)Ms. Brea Carlos will transfer from bed to chair and chair to bed with INDEPENDENCE using the least restrictive device within 4-7 treatment day(s). (3.)Ms. Brea Carlos will ambulate with INDEPENDENCE for 250 feet with the least restrictive device within 4-7 treatment day(s). ________________________________________________________________________________________________     PHYSICAL THERAPY: Daily Note, Treatment Day: 1st, AM 5/1/2018  INPATIENT: Hospital Day: 5  [place] / Product Type: Medicare /      NAME/AGE/GENDER: Michael Cabrera is a 77 y.o. female   PRIMARY DIAGNOSIS: Subdural hematoma (HCC) Subdural hematoma (HCC) Subdural hematoma (HCC)        ICD-10: Treatment Diagnosis:    · Generalized Muscle Weakness (M62.81)  · Difficulty in walking, Not elsewhere classified (R26.2)  · History of falling (Z91.81)   · Unsteadiness on feet (R26.81)    Precaution/Allergies:  Sulfa (sulfonamide antibiotics) and Aspirin      ASSESSMENT:     Ms. Brea Carlos presents supine in the bed agreeable to have therapy. She reports her hands/arms are hurting and she was given pain medication prior to therapy. She also complained of RLE knee pain which appears swollen and painful to move. She participated in BLE AROM strength exercises in supine. Supine to sit was minimal assist with good sitting balance. Sit to stand was CGA without an assistive device with a 2nd person for safety secondary to her RLE knee pain. Standing balance at the bedside was steady without use of an assistive device. She ambulated in place 10 steps with CGA. She then took 3-4 more steps over to the bedside recliner to sit up for a while. She was positioned for comfort and an ice pack was made for her RLE knee applied to assist with pain and swelling. She is pleasant and cooperative and making steady gains with her mobility. Pain remains the primary limitation. This section established at most recent assessment   PROBLEM LIST (Impairments causing functional limitations):  1. Decreased Strength  2. Decreased ADL/Functional Activities  3. Decreased Transfer Abilities  4. Decreased Ambulation Ability/Technique  5. Decreased Balance  6. Increased Pain  7. Decreased Activity Tolerance  8. Increased Fatigue  9. Decreased Flexibility/Joint Mobility  10. Edema/Girth   INTERVENTIONS PLANNED: (Benefits and precautions of physical therapy have been discussed with the patient.)  1. Balance Exercise  2. Bed Mobility  3. Gait Training  4. Home Exercise Program (HEP)  5. Neuromuscular Re-education/Strengthening  6. Range of Motion (ROM)  7. Therapeutic Activites  8. Therapeutic Exercise/Strengthening  9. Transfer Training  10. Group Therapy     TREATMENT PLAN: Frequency/Duration: 3 times a week for duration of hospital stay  Rehabilitation Potential For Stated Goals: Good     RECOMMENDED REHABILITATION/EQUIPMENT: (at time of discharge pending progress): Due to the probability of continued deficits (see above) this patient will likely need continued skilled physical therapy after discharge. Equipment:    None at this time              HISTORY:   History of Present Injury/Illness (Reason for Referral):  Per MD note: \"Patient is a 77 y.o. female who presents to the ER after a fall at home in which she hit her head. Denies syncope or LOC. She states that her foot just got caught on a step. She hit the left side of her head and does have a large bruise around left eye. Has pain in hands and knees. Reports nausea, no vomiting, and a headache. She is on coumadin with an INR of 2 for mechanical valve. Head CT shows a small subdural hematoma.   ER discussed with cardiology and neurosurgery; they have decided to reverse anti-coagulation to less than 2 with FFP to minimize subdural bleeding. Recommend repeat CT in about 6 hours. xrays also show nondisplaced fracture of shaft of fifth metacarpal bone of left hand. Splint has been applied. \"  Past Medical History/Comorbidities:   Ms. Josue Augustin  has a past medical history of Anticoagulated on Coumadin (2013); Benign hypertension; CAD (coronary artery disease) (2013); Cardiomyopathy [facility]); CHF (congestive heart failure) ([facility] (2017); Chronic left-sided low back pain without sciatica (6/15/2016); Chronic respiratory failure ([facility] (2014); CKD (chronic kidney disease) stage 3, GFR 30-59 ml/min (7/10/2013); COPD [facility]ch[facility] obstructive pulmonary disease[facility] (2014); Essential hypertension, benign (2013); HLD (hyperlipidemia); ICD (implantable cardioverter-defibrillator) in place (10/2/2014); Iron deficiency anemia due to chronic blood loss (2009); MDS (myelodysplastic syndrome) ([facility] (2011); REJI (obstructive sleep apnea)-cpap (2014); Osteoarthritis; Osteopenia; Quadrantanopia; Secondary pulmonary hypertension; Tachycardia; and Visual complaint (2015). She also has no past medical history of Contact dermatitis and other eczema, due to unspecified cause; Difficult intubation; Malignant hyperthermia due to anesthesia; Nausea & vomiting; Pseudocholinesterase deficiency; or Unspecified adverse effect of anesthesia. Ms. Josue Augustin  has a past surgical history that includes cardiac catheterization (); ir bx bone marrow diagnostic (2011); hx aortic valve replacement (, ); hx  section; hx tubal ligation; hx heart catheterization (); hx coronary stent placement (May, 2014); hx pacemaker (10/2/2014); and hx endoscopy (2009).   Social History/Living Environment:   Home Environment: Apartment  # Steps to Enter: 2 (pt states there are other entrances without steps)  One/Two Story Residence: One story  Living Alone: Yes  Support Systems: Child(alysha) (family comes to check on her every day)  Patient Expects to be Discharged to[de-identified] Apartment  Current DME Used/Available at Home:  (pt does not use cane/walker at home)  Prior Level of Function/Work/Activity:  Pt lived alone and required no AD for ambulation  Personal Factors:          Sex:  female        Age:  77 y.o. Number of Personal Factors/Comorbidities that affect the Plan of Care: 3+: HIGH COMPLEXITY   EXAMINATION:   Most Recent Physical Functioning:   Gross Assessment:                  Posture:     Balance:  Sitting: Intact  Sitting - Static: Good (unsupported)  Sitting - Dynamic: Good (unsupported)  Standing: Impaired  Standing - Static: Good  Standing - Dynamic : Fair (secondary to right knee pain/swelling) Bed Mobility:  Rolling: Minimum assistance  Supine to Sit: Minimum assistance  Scooting: Minimum assistance  Interventions: Manual cues; Verbal cues  Wheelchair Mobility:     Transfers:  Sit to Stand: Contact guard assistance  Stand to Sit: Contact guard assistance  Bed to Chair: Contact guard assistance  Gait:     Base of Support: Widened  Speed/Winifred: Slow;Shuffled  Step Length: Left shortened;Right shortened  Gait Abnormalities: Altered arm swing;Shuffling gait  Distance (ft): 10 Feet (ft)  Assistive Device: Other (comment) (none)  Ambulation - Level of Assistance: Contact guard assistance;Assist x2 (for safety since her RLE knee hurts and limited BUE use)  Interventions: Manual cues; Safety awareness training      Body Structures Involved:  1. Nerves  2. Eyes and Ears  3. Bones  4. Joints  5. Muscles  6. Ligaments Body Functions Affected:  1. Sensory/Pain  2. Neuromusculoskeletal  3. Movement Related  4. Skin Related Activities and Participation Affected:  1. General Tasks and Demands  2. Mobility  3. Self Care  4. Domestic Life  5. Interpersonal Interactions and Relationships  6.  [employer], Social and [employer] Life   Number of elements that affect the Plan of Care: 4+: HIGH COMPLEXITY   CLINICAL PRESENTATION:   Presentation: Evolving clinical presentation with changing clinical characteristics: MODERATE COMPLEXITY   CLINICAL DECISION MAKIN [facility] Mobility Inpatient Short Form  How much difficulty does the patient currently have. .. Unable A Lot A Little None   1. Turning over in bed (including adjusting bedclothes, sheets and blankets)? [] 1   [] 2   [x] 3   [] 4   2. Sitting down on and standing up from a chair with arms ( e.g., wheelchair, bedside commode, etc.)   [] 1   [] 2   [x] 3   [] 4   3. Moving from lying on back to sitting on the side of the bed? [] 1   [x] 2   [] 3   [] 4   How much help from another person does the patient currently need. .. Total A Lot A Little None   4. Moving to and from a bed to a chair (including a wheelchair)? [] 1   [] 2   [x] 3   [] 4   5. Need to walk in hospital room? [] 1   [x] 2   [] 3   [] 4   6. Climbing 3-5 steps with a railing? [x] 1   [] 2   [] 3   [] 4   © , Trustees of [address] [place] [address], under license to [employer]. All rights reserved      Score:  Initial: 14 Most Recent: X (Date: -- )    Interpretation of Tool:  Represents activities that are increasingly more difficult (i.e. Bed mobility, Transfers, Gait). Score 24 23 22-20 19-15 14-10 9-7 6     Modifier CH CI CJ CK CL CM CN      ?  Mobility - Walking and Moving Around:     - CURRENT STATUS: CL - 60%-79% impaired, limited or restricted    - GOAL STATUS: CJ - 20%-39% impaired, limited or restricted    - D/C STATUS:  ---------------To be determined---------------  [place] / [place] / Product Type: Medicare /      Medical Necessity:     · Patient is expected to demonstrate progress in strength, range of motion, balance and functional technique to increase independence with functional mobility and improve safety during transfers and ambulation. · Patient demonstrates good rehab potential due to higher previous functional level. Reason for Services/Other Comments:  · Patient continues to require modification of therapeutic interventions to increase complexity of exercises. Use of outcome tool(s) and clinical judgement create a POC that gives a: Questionable prediction of patient's progress: MODERATE COMPLEXITY            TREATMENT:   (In addition to Assessment/Re-Assessment sessions the following treatments were rendered)   Pre-treatment Symptoms/Complaints:  Patient was pleasant and cooperative and attempted all tasks with therapy. Her RLE knee and BUE arms/hands are very painful with movement  Pain: Initial:   Pain Intensity 1: 8  Pain Location 1: Hand, Arm  Pain Intervention(s) 1: Medication (see MAR), Position  Post Session:  8/10 with pain medication already given. Therapeutic Activity: (    25 minutes): Therapeutic activities including Bed transfers, Chair transfers, Ambulation on level ground, static standing, and BLE AROM strength exercises in supine to improve mobility, strength, balance and dynamic movement of leg - bilateral to improve functional mobility. Required minimal Manual cues; Safety awareness training to promote static and dynamic balance in standing. Braces/Orthotics/Lines/Etc:   · NC on 3L with O2 sats at 95%  · Treatment/Session Assessment:  Patient participated and tolerated therapy well despite having so much RLE knee and BUE arm/hand pain. · Interdisciplinary Collaboration:   o Physical Therapist  o Registered Nurse  · After treatment position/precautions:   o Up in chair  o Bed/Chair-wheels locked  o Bed in low position  o Call light within reach  o RN notified   · Compliance with Program/Exercises: Will assess as treatment progresses. · Recommendations/Intent for next treatment session:   \"Next visit will focus on advancements to more challenging activities and reduction in assistance provided\".   Total Treatment Duration:  PT Patient Time In/Time Out  Time In: 1035  Time Out: 1400 [facility]

## 2018-05-01 NOTE — PROGRESS NOTES
Pt remains up in chair and tolerating well. No distress noted at this time. Call light in reach, door open will monitor.

## 2018-05-01 NOTE — PROGRESS NOTES
Patient sitting up in bed, on O2 n/c, RR even and unlabored, family @ bedside, no c/o pain or discomfort, splint in place to LUE, RUE fingers remained taped together, patient with +2 edema to BLE, no needs voiced @ this time, call light within reach.

## 2018-05-01 NOTE — PROGRESS NOTES
Pt in bed with triology in place. Pt awake and request triology to be removed, removed and pt placed on 3  L NC At this time. Pt complaints of bilateral hand pain 9/10. Pt has tapped fingers on right hand for comfort and splint to left hand. Bruising around left eye, left eye bloody. Pt has bruising to left upper thigh and right knee. Pt refuses SCDS at this time. Pt assisted to BP with good urine output. Pt encouraged to call for assistance if needed call light in reach, door open will monitor.

## 2018-05-01 NOTE — PROGRESS NOTES
D/w Lucía Em NP. If HR < 60 and SBP < 100 and patient still symptomatic then may decrease Metoprolol to 12.5 mg BID.    Patient has been resting quietly during night, on trilogy, prn pain medication given during night for c/o UE pain, without need @ This time, call light within reach.

## 2018-05-01 NOTE — PROGRESS NOTES
Pt resting in bed with eyes closed. No s/sx of distress noted at this time. Call light in reach, door open will monitor. O2 sat 95% at this time. Will monitor.

## 2018-05-01 NOTE — CONSULTS
Consult    Subjective:     Desiree Yang is a 77 y.o.  female who is being seen for bilateral hand pain and bilateral knee pain. Onset of symptoms was abrupt with unchanged course since that time. The pain is located in bilateral hands and bilateral knees. Patient describes the pain as continuous and rated as moderate. Pain has been associated with movement. Patient denies other injuries. Past Medical History:   Diagnosis Date    Anticoagulated on Coumadin 7/9/2013    S/P AVR     Benign hypertension     controlled    CAD (coronary artery disease) 1/20/2013 5/8/14 PCI LAD with stent placed     Cardiomyopathy ([address]     CHF (congestive heart failure) ([address] 2/17/2017    Chronic left-sided low back pain without sciatica 6/15/2016    Chronic respiratory failure ([address] 4/2/2014    CKD (chronic kidney disease) stage 3, GFR 30-59 ml/min 7/10/2013    COPD (chronic obstructive pulmonary disease) ([address] 4/2/2014    Essential hypertension, benign 1/20/2013    HLD (hyperlipidemia)     ICD (implantable cardioverter-defibrillator) in place 10/2/2014    Biotronik single-chamber ICD implantation 10/20/14     Iron deficiency anemia due to chronic blood loss 7/29/2009    MDS (myelodysplastic syndrome) ([address] 12/17/2011    Procrit started in August, 2011 12/18/11 Procrit weekly and Iron stores. 5-12 12-13-12 good response to 3 weekly procrit did not need it last time  3-7-13 Pt doing well. Just wanted a \"check-up. \" Responding to Procrit every three weeks.  8-29-13 patient has missed some injections on recently restarted hemoglobin only issue , takes oral iron iron stores each time       REJI (obstructive sleep apnea)-cpap 4/2/2014    Osteoarthritis     Osteopenia     Quadrantanopia     Secondary pulmonary hypertension     Tachycardia     Rapid H/B     Visual complaint 12/14/2015      Past Surgical History:   Procedure Laterality Date    CARDIAC CATHETERIZATION  2009    HX AORTIC VALVE REPLACEMENT  ,     mechanical valve     HX  SECTION      HX CORONARY STENT PLACEMENT  May, 2014    STEMI with Stent placement.     HX ENDOSCOPY  2009    EGD    HX HEART CATHETERIZATION      HX PACEMAKER  10/2/2014    B[employer]ronik ICD    HX TUBAL LIGATION      IR BX BONE MARROW DIAGNOSTIC  2011     Family History   Problem Relation Age of Onset    Heart Disease Mother      CHF    Hypertension Mother     Kidney Disease Mother     Heart Disease Father      CHF    Lung Disease Father     Diabetes Father     Cancer Father      prostate    Hypertension Father     Heart Attack Father     Heart Disease Maternal Aunt     Diabetes Brother     Coronary Artery Disease Other     Breast Cancer Neg Hx       Social History   Substance Use Topics    Smoking status: Former Smoker     Packs/day: 0.25     Years: 42.00     Types: Cigarettes     Start date: 10/1/1956     Quit date: 2014    Smokeless tobacco: Never Used    Alcohol use No       Current Facility-Administered Medications   Medication Dose Route Frequency Provider Last Rate Last Dose    polyethylene glycol (MIRALAX) packet 17 g  17 g Oral DAILY Luh Rueda DO   17 g at 18 0845    senna (SENOKOT) tablet 8.6 mg  1 Tab Oral DAILY Amanda Monroe DO   8.6 mg at 18 0845    oxyCODONE IR (ROXICODONE) tablet 10 mg  10 mg Oral Q4H PRN Karin Braga MD   10 mg at 18 0253    0.9% sodium chloride infusion 250 mL  250 mL IntraVENous PRN Karin Braga MD        warfarin (COUMADIN) tablet 5 mg  5 mg Oral Once per day on Sun Tue Wed Thu Sat Zeenat Zayas MD   5 mg at 18 1806    warfarin (COUMADIN) tablet 2.5 mg  2.5 mg Oral Once per day on  Zeenat Zayas MD   2.5 mg at 18 1724    albuterol (PROVENTIL VENTOLIN) nebulizer solution 2.5 mg  2.5 mg Nebulization Q4H PRN Shabbir Quintana MD   2.5 mg at 18 1445    escitalopram oxalate (LEXAPRO) tablet 10 mg  10 mg Oral DAILY Sarah Adams MD   10 mg at 05/01/18 0845    furosemide (LASIX) tablet 80 mg  80 mg Oral BID Sarah Adams MD   80 mg at 05/01/18 0846    loratadine (CLARITIN) tablet 10 mg  10 mg Oral DAILY PRN Sarah Adams MD        sacubitril-valsartan (ENTRESTO) 24-26 mg tablet 1 Tab  1 Tab Oral BID Sarah Adams MD   1 Tab at 05/01/18 3243    simvastatin (ZOCOR) tablet 20 mg  20 mg Oral QHS Sarah Adams MD   20 mg at 04/30/18 2033    tiotropium (SPIRIVA) inhalation capsule 18 mcg  1 Cap Inhalation DAILY Sarah Adams MD   18 mcg at 04/30/18 0729    sodium chloride (NS) flush 5-10 mL  5-10 mL IntraVENous Q8H Sarah Adams MD   10 mL at 05/01/18 1110    sodium chloride (NS) flush 5-10 mL  5-10 mL IntraVENous PRN Sarah Adams MD        acetaminophen (TYLENOL) tablet 650 mg  650 mg Oral Q4H PRN Sarah Adams MD   650 mg at 05/01/18 0723    diphenhydrAMINE (BENADRYL) capsule 25 mg  25 mg Oral Q4H PRN Sarah Adams MD        prochlorperazine (COMPAZINE) injection 5 mg  5 mg IntraVENous Q8H PRN Sarah Adams MD        bisacodyl (DULCOLAX) tablet 5 mg  5 mg Oral DAILY PRN Sarah Adams MD        LORazepam (ATIVAN) tablet 1 mg  1 mg Oral BID PRN Sarah Adams MD        naloxone [facility]) injection 0.4 mg  0.4 mg IntraVENous PRN Sarah Adams MD        budesonide (PULMICORT) 500 mcg/2 ml nebulizer suspension  500 mcg Nebulization BID RT Sarah Adams MD   500 mcg at 05/01/18 0731    And    albuterol (PROVENTIL VENTOLIN) nebulizer solution 2.5 mg  2.5 mg Nebulization Q6HWA RT Sarah Adams MD   2.5 mg at 05/01/18 0735    carvedilol (COREG) tablet 3.125 mg  3.125 mg Oral BID WITH MEALS Baldev Deluca MD   3.125 mg at 05/01/18 0846    traMADol (ULTRAM) tablet 50 mg  50 mg Oral Q6H PRN Naomi Gonzales MD   50 mg at 04/29/18 1040    0.9% sodium chloride infusion 250 mL  250 mL IntraVENous TANNER Faulkner MD            Allergies   Allergen Reactions    Sulfa (Sulfonamide Antibiotics) Hives    Aspirin Nausea Only     Cannot take uncoated aspirin        Review of Systems:  A comprehensive review of systems was negative except for that written in the History of Present Illness. Objective: Intake and Output:    05/01 0701 - 05/01 1900  In: 360 [P.O.:360]  Out: -   04/29 1901 - 05/01 0700  In: 720 [P.O.:720]  Out: 1500 [Urine:1500]    Physical Exam:   Visit Vitals    /81    Pulse 70    Temp 98.3 °F (36.8 °C)    Resp 16    Ht 5' 3\" (1.6 m)    Wt 100.9 kg (222 lb 6.4 oz)    SpO2 100%    Breastfeeding No    BMI 39.4 kg/m2     General appearance: alert, cooperative, no distress, appears stated age  Extremities: decreased rom bilateral hands; swelling to bilateral hands; ecchymosis to bilateral hands; ttp over right 5th proximal phalanx and ttp over left 3rd phalanx and 5 metacarpal; n/v intact; capillary refill; ecchymosis to right knee; diffusely tender over right knee; I can range her right knee with minimal pain  Pulses: 2+ and symmetric  Skin: Skin color, texture, turgor normal. No rashes or lesions  Neurologic: Alert and oriented X 3, normal strength and tone. Normal symmetric reflexes.      Data Review:   Recent Results (from the past 24 hour(s))   PROTHROMBIN TIME + INR    Collection Time: 05/01/18  6:45 AM   Result Value Ref Range    Prothrombin time 30.0 (H) 11.5 - 14.5 sec    INR 2.9     METABOLIC PANEL, BASIC    Collection Time: 05/01/18  6:45 AM   Result Value Ref Range    Sodium 141 136 - 145 mmol/L    Potassium 4.7 3.5 - 5.1 mmol/L    Chloride 97 (L) 98 - 107 mmol/L    CO2 38 (H) 21 - 32 mmol/L    Anion gap 6 (L) 7 - 16 mmol/L    Glucose 114 (H) 65 - 100 mg/dL    BUN 55 (H) 8 - 23 MG/DL    Creatinine 2.17 (H) 0.6 - 1.0 MG/DL    GFR est AA 29 (L) >60 ml/min/1.73m2    GFR est non-AA 24 (L) >60 ml/min/1.73m2    Calcium 8.4 8.3 - 10.4 MG/DL   CBC W/O DIFF    Collection Time: 05/01/18  6:45 AM   Result Value Ref Range    WBC 6.9 4.3 - 11.1 K/uL    RBC 2.70 (L) 4.05 - 5.25 M/uL    HGB 7.9 (L) 11.7 - 15.4 g/dL    HCT 25.1 (L) 35.8 - 46.3 %    MCV 93.0 79.6 - 97.8 FL    MCH 29.3 26.1 - 32.9 PG    MCHC 31.5 31.4 - 35.0 g/dL    RDW 14.0 11.9 - 14.6 %    PLATELET 677 863 - 112 K/uL    MPV 10.8 10.8 - 14.1 FL     EXAM:  XR HAND RT MIN 3 V     INDICATION:  fall, swelling     COMPARISON: None.     FINDINGS: 3  views of the right hand demonstrate a distal 5th proximal phalanx. There is dorsal  soft tissue swelling. .        IMPRESSION  IMPRESSION: Dorsal soft tissue swelling with a distal 5th proximal phalanx fracture    EXAM:  XR HAND LT MIN 3 V     INDICATION:  fall, hand pain     COMPARISON: None.     FINDINGS: The  views of the left hand demonstrate an avulsion fracture of the  base of the third proximal phalanx. There is an oblique fracture of the distal  fifth metacarpal metaphysis. The soft tissues are swollen.      IMPRESSION  IMPRESSION: Oblique fracture the distal fifth metacarpal metaphysis.     Avulsion fracture the base of the third proximal phalanx.     AP, lateral view right knee 4/30/2018     INDICATION: Pain after fall     COMPARISON: 4/27/2018     FINDINGS: No fracture, dislocation, joint effusion, or significant degenerative  change.     IMPRESSION  IMPRESSION: No acute abnormality. AP, lateral view left knee 4/30/2018     INDICATION: Pain after fall     FINDINGS: No fracture, dislocation, or discrete soft tissue swelling. No joint  effusion. No significant degenerative change.     IMPRESSION  IMPRESSION: No acute abnormality.       Assessment:     Principal Problem:    Subdural hematoma ([facility] (4/27/2018)    Active Problems:    Chronic combined systolic and diastolic heart failure ([address] (1/20/2013)      Overview: 5/8/14 ECHO:  EF 10-15%      12/2017:  EF 25-30%      Essential hypertension, benign (1/20/2013)      Anticoagulated on Coumadin (7/9/2013)      Overview: S/P AVR      CKD (chronic kidney disease) stage 3, GFR 30-59 ml/min (7/10/2013)      COPD (chronic obstructive pulmonary disease) ([address] (4/2/2014)      REJI (obstructive sleep apnea)-cpap (4/2/2014)      HLD (hyperlipidemia) ()      S/P AVR (aortic valve replacement) ()      Overview: Mechanical/Artific      Cardiomyopathy ([address] ()      Pulmonary hypertension ([address] (6/15/2016)      Type 2 diabetes mellitus with nephropathy ([address] (12/18/2017)      Long term (current) use of anticoagulants (4/19/2018)      Closed nondisplaced fracture of shaft of fifth metacarpal bone of left hand (4/28/2018)        Plan:     X-RAYS OF RIGHT HAND REVIEWED - 5TH PROXIMAL PHALANX FRACTURE     XRAYS OF LEFT HAND REVIEWED - AVULSION FRACTURE OF THE 3RD PROXIMAL PHALANX AND 5TH METACARPAL FRACTURE     NO LIFTING WITH EITHER HAND    PLAN - RIGHT 5TH PROXIMAL PHALANX FRACTURE - BASILIA TAPE LEFT 4TH AND 5TH DIGIT     LEFT 3RD PROXIMAL PHALANX FRACTURE AND 5TH METACARPAL FRACTURE - BASILIA TAPE 3RD AND 4TH DIGIT AND VOLAR/DORSAL SPLINT    Signed By: Lashay Pope NP     May 1, 2018

## 2018-05-01 NOTE — PROGRESS NOTES
Hospitalist Progress Note    2018  Admit Date: 2018  7:56 PM   NAME: Colleen Resendiz   :  1950   MRN:  [id number]   Attending: Jerilyn Chowdhury MD  PCP:  Steve Santiago MD    SUBJECTIVE:   Patient 65yo AAF with mechanical aortic valve for which she takes coumadin, presented  after fall at home, missed a step. Found to have left and right hand fractures. At first thought to have possible small subdural hematoma and coumadin was held. Repeat imaging actually shows small SDH was actually artifact. neurosx signed off and coumadin restarted. Ortho is following for hand fractures. She follows Dr Yvonne Adames for symptomatic anemia and receives monthly procrit. Was transfused one unit on .  - pain in right hand slightly better but d/t stroke she has a hard time guarding the fracture. Review of Systems negative with exception of pertinent positives noted above  PHYSICAL EXAM     Visit Vitals    /63 (BP 1 Location: Right arm, BP Patient Position: Sitting)    Pulse 72    Temp 98.2 °F (36.8 °C)    Resp 18    Ht 5' 3\" (1.6 m)    Wt 100.9 kg (222 lb 6.4 oz)    SpO2 96%    Breastfeeding No    BMI 39.4 kg/m2      Temp (24hrs), Av.1 °F (36.7 °C), Min:97.6 °F (36.4 °C), Max:98.3 °F (36.8 °C)    Oxygen Therapy  O2 Sat (%): 96 % (18 1535)  Pulse via Oximetry: 65 beats per minute (18 1456)  O2 Device: Nasal cannula (18 1456)  O2 Flow Rate (L/min): 2 l/min (18 1456)  FIO2 (%): 32 % (18 2342)    Intake/Output Summary (Last 24 hours) at 18 1633  Last data filed at 18 1200   Gross per 24 hour   Intake              960 ml   Output              400 ml   Net              560 ml      General: No acute distress    Lungs:  CTA Bilaterally.    Heart:  Regular rate and rhythm,  No murmur, rub, or gallop  Abdomen: Soft, Non distended, Non tender, Positive bowel sounds  Extremities: Left hand splinted, right hand slightly swollen with fingers rachel taped  Neurologic:  No focal deficits    ASSESSMENT      Active Hospital Problems    Diagnosis Date Noted    Closed nondisplaced fracture of shaft of fifth metacarpal bone of left hand 04/28/2018    Subdural hematoma ([facility] 04/27/2018    Long term (current) use of anticoagulants 04/19/2018    Type 2 diabetes mellitus with nephropathy ([address] 12/18/2017    Pulmonary hypertension ([address] 06/15/2016    S/P AVR (aortic valve replacement)      Mechanical/Artific      Cardiomyopathy ([address]     HLD (hyperlipidemia)     REJI (obstructive sleep apnea)-cpap 04/02/2014    COPD (chronic obstructive pulmonary disease) ([facility] 04/02/2014    CKD (chronic kidney disease) stage 3, GFR 30-59 ml/min 07/10/2013    Anticoagulated on Coumadin 07/09/2013     S/P AVR      Essential hypertension, benign 01/20/2013    Chronic combined systolic and diastolic heart failure ([facility] [address] 01/20/2013 5/8/14 ECHO:  EF 10-15%  12/2017:  EF 25-30%     A/p  - Right and left hand fractures - right 5th proximal phalanx fracture, Left hand avulsion fracture of the 3rd proximal phalanx and 5th metacarpal fracture. ortho following. Splint left hand and rachel tape the right. No lifing with either hand  - Constipation - add bowel regimen  - Mechanical aortic valve - coumadin resumed. inr at goal  - CKD - stage 3. Cr close to baseline monitor  - Chronic combined systolic/diastolic CHF - continue cont meds. Seemingly compesnated. EF 25%  - Dm2 - BG at goal. Cont current  - Chronic anemia - transfused one unit prbc 4/29 for hgb 7 - now up to 7.9. Cont to monitor. No active blood loss seen.      DVT Prophylaxis: coumadin    Dispo - hopeful for 9th floor discharge tomorrow    Signed By: Juliane Mireles DO     May 1, 2018

## 2018-05-01 NOTE — PROGRESS NOTES
Patient with desaturations into low 80s while on trilogy, O2 increased to 8L by RT, patient tolerating well.

## 2018-05-02 ENCOUNTER — HOSPITAL ENCOUNTER (INPATIENT)
Age: 68
LOS: 14 days | Discharge: HOME HEALTH | DRG: 560 | End: 2018-05-16
Attending: PHYSICAL MEDICINE & REHABILITATION | Admitting: PHYSICAL MEDICINE & REHABILITATION
Payer: MEDICARE

## 2018-05-02 ENCOUNTER — APPOINTMENT (OUTPATIENT)
Dept: GENERAL RADIOLOGY | Age: 68
DRG: 560 | End: 2018-05-02
Attending: PHYSICAL MEDICINE & REHABILITATION
Payer: MEDICARE

## 2018-05-02 VITALS
HEIGHT: 63 IN | RESPIRATION RATE: 18 BRPM | TEMPERATURE: 98.2 F | DIASTOLIC BLOOD PRESSURE: 76 MMHG | HEART RATE: 76 BPM | SYSTOLIC BLOOD PRESSURE: 141 MMHG | OXYGEN SATURATION: 95 % | WEIGHT: 221.3 LBS | BODY MASS INDEX: 39.21 KG/M2

## 2018-05-02 DIAGNOSIS — S62.357A CLOSED NONDISPLACED FRACTURE OF SHAFT OF FIFTH METACARPAL BONE OF LEFT HAND, INITIAL ENCOUNTER: ICD-10-CM

## 2018-05-02 DIAGNOSIS — Z95.2 S/P AVR (AORTIC VALVE REPLACEMENT): Chronic | ICD-10-CM

## 2018-05-02 DIAGNOSIS — G47.33 OSA (OBSTRUCTIVE SLEEP APNEA): Chronic | ICD-10-CM

## 2018-05-02 DIAGNOSIS — Z79.01 ANTICOAGULATED ON COUMADIN: ICD-10-CM

## 2018-05-02 DIAGNOSIS — I42.9 CARDIOMYOPATHY, UNSPECIFIED TYPE (HCC): Chronic | ICD-10-CM

## 2018-05-02 DIAGNOSIS — D50.0 IRON DEFICIENCY ANEMIA DUE TO CHRONIC BLOOD LOSS: Chronic | ICD-10-CM

## 2018-05-02 DIAGNOSIS — S83.91XA SPRAIN OF RIGHT KNEE, INITIAL ENCOUNTER: ICD-10-CM

## 2018-05-02 DIAGNOSIS — I50.42 CHRONIC COMBINED SYSTOLIC AND DIASTOLIC HEART FAILURE (HCC): Chronic | ICD-10-CM

## 2018-05-02 DIAGNOSIS — H11.32 SUBCONJUNCTIVAL HEMORRHAGE, LEFT: ICD-10-CM

## 2018-05-02 DIAGNOSIS — N18.30 CKD (CHRONIC KIDNEY DISEASE) STAGE 3, GFR 30-59 ML/MIN (HCC): Chronic | ICD-10-CM

## 2018-05-02 DIAGNOSIS — F34.1 DYSTHYMIA: ICD-10-CM

## 2018-05-02 DIAGNOSIS — D46.9 MDS (MYELODYSPLASTIC SYNDROME) (HCC): Chronic | ICD-10-CM

## 2018-05-02 DIAGNOSIS — M85.80 OSTEOPENIA, UNSPECIFIED LOCATION: ICD-10-CM

## 2018-05-02 DIAGNOSIS — I27.20 PULMONARY HYPERTENSION (HCC): Chronic | ICD-10-CM

## 2018-05-02 DIAGNOSIS — S62.357S: ICD-10-CM

## 2018-05-02 DIAGNOSIS — E11.9 DIABETES MELLITUS TYPE 2, DIET-CONTROLLED (HCC): Chronic | ICD-10-CM

## 2018-05-02 DIAGNOSIS — E11.21 TYPE 2 DIABETES MELLITUS WITH NEPHROPATHY (HCC): Chronic | ICD-10-CM

## 2018-05-02 DIAGNOSIS — Z79.01 LONG TERM (CURRENT) USE OF ANTICOAGULANTS: Chronic | ICD-10-CM

## 2018-05-02 DIAGNOSIS — Z95.810 ICD (IMPLANTABLE CARDIOVERTER-DEFIBRILLATOR) IN PLACE: ICD-10-CM

## 2018-05-02 DIAGNOSIS — M19.019 OSTEOARTHRITIS OF SHOULDER, UNSPECIFIED LATERALITY, UNSPECIFIED OSTEOARTHRITIS TYPE: ICD-10-CM

## 2018-05-02 DIAGNOSIS — I10 ESSENTIAL HYPERTENSION, BENIGN: Chronic | ICD-10-CM

## 2018-05-02 DIAGNOSIS — E78.2 MIXED HYPERLIPIDEMIA: Chronic | ICD-10-CM

## 2018-05-02 DIAGNOSIS — I25.10 CORONARY ARTERY DISEASE INVOLVING NATIVE CORONARY ARTERY OF NATIVE HEART WITHOUT ANGINA PECTORIS: Chronic | ICD-10-CM

## 2018-05-02 DIAGNOSIS — S06.5XAA SUBDURAL HEMATOMA: ICD-10-CM

## 2018-05-02 DIAGNOSIS — J44.9 CHRONIC OBSTRUCTIVE PULMONARY DISEASE, UNSPECIFIED COPD TYPE (HCC): Chronic | ICD-10-CM

## 2018-05-02 PROBLEM — R53.81 PHYSICAL DEBILITY: Status: ACTIVE | Noted: 2018-05-02

## 2018-05-02 LAB
ANION GAP SERPL CALC-SCNC: 6 MMOL/L (ref 7–16)
BUN SERPL-MCNC: 56 MG/DL (ref 8–23)
CALCIUM SERPL-MCNC: 9.4 MG/DL (ref 8.3–10.4)
CHLORIDE SERPL-SCNC: 100 MMOL/L (ref 98–107)
CO2 SERPL-SCNC: 37 MMOL/L (ref 21–32)
CREAT SERPL-MCNC: 1.71 MG/DL (ref 0.6–1)
ERYTHROCYTE [DISTWIDTH] IN BLOOD BY AUTOMATED COUNT: 14.1 % (ref 11.9–14.6)
GLUCOSE SERPL-MCNC: 123 MG/DL (ref 65–100)
HCT VFR BLD AUTO: 27.2 % (ref 35.8–46.3)
HGB BLD-MCNC: 8.5 G/DL (ref 11.7–15.4)
INR PPP: 2.9
MCH RBC QN AUTO: 28.6 PG (ref 26.1–32.9)
MCHC RBC AUTO-ENTMCNC: 31.3 G/DL (ref 31.4–35)
MCV RBC AUTO: 91.6 FL (ref 79.6–97.8)
PLATELET # BLD AUTO: 232 K/UL (ref 150–450)
PMV BLD AUTO: 10.3 FL (ref 10.8–14.1)
POTASSIUM SERPL-SCNC: 4.9 MMOL/L (ref 3.5–5.1)
PROTHROMBIN TIME: 30.3 SEC (ref 11.5–14.5)
RBC # BLD AUTO: 2.97 M/UL (ref 4.05–5.25)
SODIUM SERPL-SCNC: 143 MMOL/L (ref 136–145)
WBC # BLD AUTO: 8.4 K/UL (ref 4.3–11.1)

## 2018-05-02 PROCEDURE — 97162 PT EVAL MOD COMPLEX 30 MIN: CPT

## 2018-05-02 PROCEDURE — 74011250637 HC RX REV CODE- 250/637: Performed by: INTERNAL MEDICINE

## 2018-05-02 PROCEDURE — 94640 AIRWAY INHALATION TREATMENT: CPT

## 2018-05-02 PROCEDURE — 97530 THERAPEUTIC ACTIVITIES: CPT

## 2018-05-02 PROCEDURE — 74011250637 HC RX REV CODE- 250/637: Performed by: PHYSICAL MEDICINE & REHABILITATION

## 2018-05-02 PROCEDURE — 85610 PROTHROMBIN TIME: CPT | Performed by: NURSE PRACTITIONER

## 2018-05-02 PROCEDURE — 99223 1ST HOSP IP/OBS HIGH 75: CPT | Performed by: PHYSICAL MEDICINE & REHABILITATION

## 2018-05-02 PROCEDURE — 73030 X-RAY EXAM OF SHOULDER: CPT

## 2018-05-02 PROCEDURE — 36415 COLL VENOUS BLD VENIPUNCTURE: CPT | Performed by: NURSE PRACTITIONER

## 2018-05-02 PROCEDURE — 65310000000 HC RM PRIVATE REHAB

## 2018-05-02 PROCEDURE — 80048 BASIC METABOLIC PNL TOTAL CA: CPT | Performed by: NURSE PRACTITIONER

## 2018-05-02 PROCEDURE — 74011250637 HC RX REV CODE- 250/637: Performed by: FAMILY MEDICINE

## 2018-05-02 PROCEDURE — 94760 N-INVAS EAR/PLS OXIMETRY 1: CPT

## 2018-05-02 PROCEDURE — 97116 GAIT TRAINING THERAPY: CPT

## 2018-05-02 PROCEDURE — 74011000250 HC RX REV CODE- 250: Performed by: FAMILY MEDICINE

## 2018-05-02 PROCEDURE — 77010033678 HC OXYGEN DAILY

## 2018-05-02 PROCEDURE — 74011000250 HC RX REV CODE- 250: Performed by: PHYSICAL MEDICINE & REHABILITATION

## 2018-05-02 PROCEDURE — 97535 SELF CARE MNGMENT TRAINING: CPT

## 2018-05-02 PROCEDURE — 85027 COMPLETE CBC AUTOMATED: CPT | Performed by: NURSE PRACTITIONER

## 2018-05-02 PROCEDURE — 94762 N-INVAS EAR/PLS OXIMTRY CONT: CPT

## 2018-05-02 PROCEDURE — 97166 OT EVAL MOD COMPLEX 45 MIN: CPT

## 2018-05-02 RX ORDER — LORAZEPAM 1 MG/1
1 TABLET ORAL
Status: DISCONTINUED | OUTPATIENT
Start: 2018-05-02 | End: 2018-05-16 | Stop reason: HOSPADM

## 2018-05-02 RX ORDER — CARVEDILOL 3.12 MG/1
3.12 TABLET ORAL 2 TIMES DAILY WITH MEALS
Qty: 1 TAB | Refills: 0 | Status: SHIPPED
Start: 2018-05-02 | End: 2018-05-16

## 2018-05-02 RX ORDER — POLYETHYLENE GLYCOL 3350 17 G/17G
17 POWDER, FOR SOLUTION ORAL DAILY
Status: CANCELLED | OUTPATIENT
Start: 2018-05-02

## 2018-05-02 RX ORDER — OXYCODONE HYDROCHLORIDE 5 MG/1
10 TABLET ORAL
Status: CANCELLED | OUTPATIENT
Start: 2018-05-02

## 2018-05-02 RX ORDER — FACIAL-BODY WIPES
10 EACH TOPICAL DAILY PRN
Status: DISCONTINUED | OUTPATIENT
Start: 2018-05-02 | End: 2018-05-16 | Stop reason: HOSPADM

## 2018-05-02 RX ORDER — OXYCODONE HYDROCHLORIDE 10 MG/1
10 TABLET ORAL
Qty: 1 TAB | Refills: 0 | Status: SHIPPED
Start: 2018-05-02 | End: 2018-07-09 | Stop reason: SDUPTHER

## 2018-05-02 RX ORDER — ALBUTEROL SULFATE 0.83 MG/ML
2.5 SOLUTION RESPIRATORY (INHALATION)
Status: DISCONTINUED | OUTPATIENT
Start: 2018-05-02 | End: 2018-05-16 | Stop reason: HOSPADM

## 2018-05-02 RX ORDER — BUDESONIDE 0.5 MG/2ML
500 INHALANT ORAL
Status: DISCONTINUED | OUTPATIENT
Start: 2018-05-02 | End: 2018-05-13

## 2018-05-02 RX ORDER — TRAMADOL HYDROCHLORIDE 50 MG/1
50 TABLET ORAL
Status: DISCONTINUED | OUTPATIENT
Start: 2018-05-02 | End: 2018-05-16 | Stop reason: HOSPADM

## 2018-05-02 RX ORDER — PROCHLORPERAZINE EDISYLATE 5 MG/ML
5 INJECTION INTRAMUSCULAR; INTRAVENOUS
Status: CANCELLED | OUTPATIENT
Start: 2018-05-02

## 2018-05-02 RX ORDER — SODIUM CHLORIDE 0.9 % (FLUSH) 0.9 %
5-10 SYRINGE (ML) INJECTION EVERY 8 HOURS
Status: DISCONTINUED | OUTPATIENT
Start: 2018-05-02 | End: 2018-05-06

## 2018-05-02 RX ORDER — ESCITALOPRAM OXALATE 10 MG/1
10 TABLET ORAL DAILY
Status: CANCELLED | OUTPATIENT
Start: 2018-05-02

## 2018-05-02 RX ORDER — ACETAMINOPHEN 325 MG/1
650 TABLET ORAL
Status: CANCELLED | OUTPATIENT
Start: 2018-05-02

## 2018-05-02 RX ORDER — WARFARIN SODIUM 5 MG/1
5 TABLET ORAL
Qty: 1 TAB | Refills: 0 | Status: SHIPPED
Start: 2018-05-02 | End: 2018-09-23

## 2018-05-02 RX ORDER — NALOXONE HYDROCHLORIDE 0.4 MG/ML
0.4 INJECTION, SOLUTION INTRAMUSCULAR; INTRAVENOUS; SUBCUTANEOUS AS NEEDED
Status: CANCELLED | OUTPATIENT
Start: 2018-05-02

## 2018-05-02 RX ORDER — SODIUM CHLORIDE 0.9 % (FLUSH) 0.9 %
5-10 SYRINGE (ML) INJECTION EVERY 8 HOURS
Status: CANCELLED | OUTPATIENT
Start: 2018-05-02

## 2018-05-02 RX ORDER — PROCHLORPERAZINE EDISYLATE 5 MG/ML
5 INJECTION INTRAMUSCULAR; INTRAVENOUS
Status: DISCONTINUED | OUTPATIENT
Start: 2018-05-02 | End: 2018-05-16 | Stop reason: HOSPADM

## 2018-05-02 RX ORDER — DIPHENHYDRAMINE HCL 25 MG
25 CAPSULE ORAL
Status: CANCELLED | OUTPATIENT
Start: 2018-05-02

## 2018-05-02 RX ORDER — SODIUM CHLORIDE 9 MG/ML
250 INJECTION, SOLUTION INTRAVENOUS AS NEEDED
Status: CANCELLED | OUTPATIENT
Start: 2018-05-02

## 2018-05-02 RX ORDER — SIMVASTATIN 20 MG/1
20 TABLET, FILM COATED ORAL
Status: CANCELLED | OUTPATIENT
Start: 2018-05-02

## 2018-05-02 RX ORDER — LORATADINE 10 MG/1
10 TABLET ORAL
Status: DISCONTINUED | OUTPATIENT
Start: 2018-05-02 | End: 2018-05-16 | Stop reason: HOSPADM

## 2018-05-02 RX ORDER — WARFARIN SODIUM 5 MG/1
5 TABLET ORAL
Status: CANCELLED | OUTPATIENT
Start: 2018-05-02

## 2018-05-02 RX ORDER — ESCITALOPRAM OXALATE 10 MG/1
10 TABLET ORAL DAILY
Status: DISCONTINUED | OUTPATIENT
Start: 2018-05-03 | End: 2018-05-16 | Stop reason: HOSPADM

## 2018-05-02 RX ORDER — TRAMADOL HYDROCHLORIDE 50 MG/1
50 TABLET ORAL
Status: CANCELLED | OUTPATIENT
Start: 2018-05-02

## 2018-05-02 RX ORDER — ALBUTEROL SULFATE 0.83 MG/ML
2.5 SOLUTION RESPIRATORY (INHALATION)
Status: CANCELLED | OUTPATIENT
Start: 2018-05-02

## 2018-05-02 RX ORDER — WARFARIN 2.5 MG/1
2.5 TABLET ORAL
Status: DISCONTINUED | OUTPATIENT
Start: 2018-05-04 | End: 2018-05-16 | Stop reason: HOSPADM

## 2018-05-02 RX ORDER — POLYETHYLENE GLYCOL 3350 17 G/17G
17 POWDER, FOR SOLUTION ORAL DAILY
Status: DISCONTINUED | OUTPATIENT
Start: 2018-05-03 | End: 2018-05-16 | Stop reason: HOSPADM

## 2018-05-02 RX ORDER — BUDESONIDE 0.5 MG/2ML
500 INHALANT ORAL
Status: CANCELLED | OUTPATIENT
Start: 2018-05-02

## 2018-05-02 RX ORDER — NALOXONE HYDROCHLORIDE 0.4 MG/ML
0.4 INJECTION, SOLUTION INTRAMUSCULAR; INTRAVENOUS; SUBCUTANEOUS AS NEEDED
Status: DISCONTINUED | OUTPATIENT
Start: 2018-05-02 | End: 2018-05-16 | Stop reason: HOSPADM

## 2018-05-02 RX ORDER — WARFARIN 2.5 MG/1
5 TABLET ORAL
Status: DISCONTINUED | OUTPATIENT
Start: 2018-05-02 | End: 2018-05-16 | Stop reason: HOSPADM

## 2018-05-02 RX ORDER — SODIUM CHLORIDE 0.9 % (FLUSH) 0.9 %
5-10 SYRINGE (ML) INJECTION AS NEEDED
Status: DISCONTINUED | OUTPATIENT
Start: 2018-05-02 | End: 2018-05-16 | Stop reason: HOSPADM

## 2018-05-02 RX ORDER — FUROSEMIDE 40 MG/1
80 TABLET ORAL 2 TIMES DAILY
Status: CANCELLED | OUTPATIENT
Start: 2018-05-02

## 2018-05-02 RX ORDER — WARFARIN 2.5 MG/1
2.5 TABLET ORAL
Qty: 1 TAB | Refills: 0 | Status: SHIPPED | OUTPATIENT
Start: 2018-05-05 | End: 2018-09-23

## 2018-05-02 RX ORDER — LORATADINE 10 MG/1
10 TABLET ORAL
Status: CANCELLED | OUTPATIENT
Start: 2018-05-02

## 2018-05-02 RX ORDER — ACETAMINOPHEN 325 MG/1
650 TABLET ORAL
Status: DISCONTINUED | OUTPATIENT
Start: 2018-05-02 | End: 2018-05-16 | Stop reason: HOSPADM

## 2018-05-02 RX ORDER — ALBUTEROL SULFATE 0.83 MG/ML
2.5 SOLUTION RESPIRATORY (INHALATION)
Status: DISCONTINUED | OUTPATIENT
Start: 2018-05-02 | End: 2018-05-13

## 2018-05-02 RX ORDER — SODIUM CHLORIDE 0.9 % (FLUSH) 0.9 %
5-10 SYRINGE (ML) INJECTION AS NEEDED
Status: CANCELLED | OUTPATIENT
Start: 2018-05-02

## 2018-05-02 RX ORDER — SENNOSIDES 8.6 MG/1
1 TABLET ORAL DAILY
Status: CANCELLED | OUTPATIENT
Start: 2018-05-02

## 2018-05-02 RX ORDER — FUROSEMIDE 40 MG/1
80 TABLET ORAL 2 TIMES DAILY
Status: DISCONTINUED | OUTPATIENT
Start: 2018-05-02 | End: 2018-05-08

## 2018-05-02 RX ORDER — FACIAL-BODY WIPES
10 EACH TOPICAL DAILY PRN
Status: CANCELLED | OUTPATIENT
Start: 2018-05-02

## 2018-05-02 RX ORDER — SENNOSIDES 8.6 MG/1
1 TABLET ORAL DAILY
Status: DISCONTINUED | OUTPATIENT
Start: 2018-05-03 | End: 2018-05-16 | Stop reason: HOSPADM

## 2018-05-02 RX ORDER — CARVEDILOL 3.12 MG/1
3.12 TABLET ORAL 2 TIMES DAILY WITH MEALS
Status: DISCONTINUED | OUTPATIENT
Start: 2018-05-02 | End: 2018-05-16 | Stop reason: HOSPADM

## 2018-05-02 RX ORDER — SODIUM CHLORIDE 9 MG/ML
250 INJECTION, SOLUTION INTRAVENOUS AS NEEDED
Status: DISCONTINUED | OUTPATIENT
Start: 2018-05-02 | End: 2018-05-08 | Stop reason: SDUPTHER

## 2018-05-02 RX ORDER — OXYCODONE HYDROCHLORIDE 5 MG/1
10 TABLET ORAL
Status: DISCONTINUED | OUTPATIENT
Start: 2018-05-02 | End: 2018-05-16 | Stop reason: HOSPADM

## 2018-05-02 RX ORDER — SIMVASTATIN 20 MG/1
20 TABLET, FILM COATED ORAL
Status: DISCONTINUED | OUTPATIENT
Start: 2018-05-02 | End: 2018-05-16 | Stop reason: HOSPADM

## 2018-05-02 RX ORDER — BISACODYL 5 MG
5 TABLET, DELAYED RELEASE (ENTERIC COATED) ORAL DAILY PRN
Status: DISCONTINUED | OUTPATIENT
Start: 2018-05-02 | End: 2018-05-16 | Stop reason: HOSPADM

## 2018-05-02 RX ORDER — DIPHENHYDRAMINE HCL 25 MG
25 CAPSULE ORAL
Status: DISCONTINUED | OUTPATIENT
Start: 2018-05-02 | End: 2018-05-08

## 2018-05-02 RX ORDER — BISACODYL 5 MG
5 TABLET, DELAYED RELEASE (ENTERIC COATED) ORAL DAILY PRN
Status: CANCELLED | OUTPATIENT
Start: 2018-05-02

## 2018-05-02 RX ORDER — FACIAL-BODY WIPES
10 EACH TOPICAL
Qty: 1 EACH | Refills: 0 | Status: SHIPPED
Start: 2018-05-02 | End: 2018-05-16

## 2018-05-02 RX ORDER — LORAZEPAM 1 MG/1
1 TABLET ORAL
Status: CANCELLED | OUTPATIENT
Start: 2018-05-02

## 2018-05-02 RX ORDER — CARVEDILOL 3.12 MG/1
3.12 TABLET ORAL 2 TIMES DAILY WITH MEALS
Status: CANCELLED | OUTPATIENT
Start: 2018-05-02

## 2018-05-02 RX ADMIN — LORAZEPAM 1 MG: 1 TABLET ORAL at 11:53

## 2018-05-02 RX ADMIN — OXYCODONE HYDROCHLORIDE 10 MG: 5 TABLET ORAL at 08:47

## 2018-05-02 RX ADMIN — SENNOSIDES 8.6 MG: 8.6 TABLET, FILM COATED ORAL at 09:09

## 2018-05-02 RX ADMIN — CARVEDILOL 3.12 MG: 3.12 TABLET, FILM COATED ORAL at 09:09

## 2018-05-02 RX ADMIN — ALBUTEROL SULFATE 2.5 MG: 2.5 SOLUTION RESPIRATORY (INHALATION) at 18:00

## 2018-05-02 RX ADMIN — BUDESONIDE 500 MCG: 0.5 INHALANT RESPIRATORY (INHALATION) at 07:44

## 2018-05-02 RX ADMIN — FUROSEMIDE 80 MG: 40 TABLET ORAL at 17:23

## 2018-05-02 RX ADMIN — WARFARIN SODIUM 5 MG: 2.5 TABLET ORAL at 17:24

## 2018-05-02 RX ADMIN — Medication 10 ML: at 06:08

## 2018-05-02 RX ADMIN — SIMVASTATIN 20 MG: 20 TABLET, FILM COATED ORAL at 21:18

## 2018-05-02 RX ADMIN — ALBUTEROL SULFATE 2.5 MG: 2.5 SOLUTION RESPIRATORY (INHALATION) at 07:44

## 2018-05-02 RX ADMIN — SACUBITRIL AND VALSARTAN 1 TABLET: 24; 26 TABLET, FILM COATED ORAL at 17:24

## 2018-05-02 RX ADMIN — POLYETHYLENE GLYCOL (3350) 17 G: 17 POWDER, FOR SOLUTION ORAL at 09:09

## 2018-05-02 RX ADMIN — TRAMADOL HYDROCHLORIDE 50 MG: 50 TABLET, FILM COATED ORAL at 12:25

## 2018-05-02 RX ADMIN — Medication 10 ML: at 21:21

## 2018-05-02 RX ADMIN — SACUBITRIL AND VALSARTAN 1 TABLET: 24; 26 TABLET, FILM COATED ORAL at 09:09

## 2018-05-02 RX ADMIN — FUROSEMIDE 80 MG: 40 TABLET ORAL at 09:09

## 2018-05-02 RX ADMIN — Medication 10 ML: at 15:31

## 2018-05-02 RX ADMIN — Medication 10 ML: at 17:27

## 2018-05-02 RX ADMIN — ESCITALOPRAM OXALATE 10 MG: 10 TABLET ORAL at 09:09

## 2018-05-02 RX ADMIN — CARVEDILOL 3.12 MG: 3.12 TABLET, FILM COATED ORAL at 16:46

## 2018-05-02 RX ADMIN — BUDESONIDE 500 MCG: 0.5 INHALANT RESPIRATORY (INHALATION) at 18:00

## 2018-05-02 NOTE — PROGRESS NOTES
Warfarin dosing per pharmacist    Corry Grullon Eileen Rebolledo is a 77 y.o. female. Indication:  Mechanical aortic valve replacement    Goal INR:  2-3    Home dose:  2.5 mg Mon, Fri; 5 mg all other days    Risk factors or significant drug interactions:  none    Other anticoagulants:  none    Daily Monitoring  Date  INR     Warfarin dose HGB              Notes  4/28  2.1          5 mg + 2.5 mg 7.4  4/29  2.4  5 mg  7  4/30  2.6  2.5 mg  8  5/1  2.9  5 mg  7.9  5/2  2.9  5 mg  8.5  Pharmacy consulted    Pharmacy consulted to dose warfarin for Ms. Corral. She has been transferred to inpatient rehab following hospital stay for complications following a fall. She is followed by [facility] at the anti-coag clinic and was therapeutic on her home dose at her last visit on 4/19/2018. Continue warfarin at PTA dose of 2.5 mg Mon, Fri; 5 mg all other days. INR has been trending up since 4/28 but has leveled off today. Will continue current dose and follow daily INR for now. Pharmacy will continue to follow. Please call with any questions.     Thank you,  Sandra Rehman, PharmD  Clinical Pharmacist  [phone]

## 2018-05-02 NOTE — IP AVS SNAPSHOT
Mi Cannon 
 
 
 2[address] [facility] 322 [facility] 
[phone] Patient: Rodger Corrales MRN: [id number] [facility]:2/0/5702 About your hospitalization You were admitted on:  May 2, 2018 You last received care in the:  [facility] 9 INPATIENT REHAB UNIT You were discharged on:  May 16, 2018 Why you were hospitalized Your primary diagnosis was:  Physical Debility Your diagnoses also included:  Iron Deficiency Anemia Due To Chronic Blood Loss, Mds (Myelodysplastic Syndrome) (Hcc), Chronic Combined Systolic And Diastolic Heart Failure (Hcc), Essential Hypertension, Benign, Anticoagulated On Coumadin, Ckd (Chronic Kidney Disease) Stage 3, Gfr 30-59 Ml/Min, Copd (Chronic Obstructive Pulmonary Disease) ([facility]), Diabetes Mellitus Type 2, Diet-Controlled (Hcc), S/P Avr (Aortic Valve Replacement), Cardiomyopathy ([facility]) Follow-up Information Follow up With Details Comments Contact Info MD Rodrigo Salazar [address] [facility] [address] 
[phone] [facility] In 2 weeks f/u hand fxs [address] [address] 
[phone] Booker Chirinos MD On 5/18/2018 Appointment already scheduled at 2:45 on Friday, May 18, 2018. [address] [employer] [address] [facility] [address] 
[phone] Matthew Maria MD On 6/4/2018 Follow up appointment at 9:00 am on 6-4-18.  [address]rig[address] [facility] [address] 
[phone] Maury Pimentel NP On 6/19/2018 For follow up appointment at 1:30 on June 19th, 2018. [facility] [address] [facility] [address] 
[phone] Emanuel Kirkpatrick MD On 5/31/2018 Follow up appointment at 10:00 am on thursday, May 31. [address] [address] 
[phone] [place] [address] [facility] OT and RN services. We have requested service to start tomorrow.   You should be contacted by home health prior to home visit. [address] [facility] [address] [facility] [address] 
[phone] Your Scheduled Appointments Friday May 18, 2018  2:45 PM EDT  
LAB with [address]  
1808 [facility] OUTREACH INSURANCE [facility] Nicole Meadows 426 187 [facility]  
[phone] Friday May 18, 2018  3:15 PM EDT Follow Up with Marianne Painter MD  
[facility] Hematology and Oncology [facility]) C/ Adan Talbot 33 [facility] [address]  
[phone] Friday May 18, 2018  4:00 PM EDT Injection with NUR8  
ST. [address] [facility] ([facility] [address] Sandee Bunch [phone] [facility] [address]  
[phone] [address] Monday June 04, 2018  9:00 AM EDT HOSPITAL FOLLOW-UP with Nicky Lozada MD  
[address] ([address] [place] [address]) [address]  
[address]  
[phone] Tuesday June 19, 2018  2:00 PM EDT  
(Arrive by 1:30 PM) HOSPITAL with [facility] ([facility]) [address] [place] [address] [facility]  
[phone] Monday June 25, 2018  9:40 AM EDT  
REMOTE DEVICE CHECK ORDERS ONLY ENCOUNTER with NAIMA GARCIA [facility] CARDIOLOGY [place] OFFICE ([address] [place] [address]) [address]  
[address]  
[phone] Please remember THIS REMOTE CHECK IS COMPLETED FROM HOME - YOU WILL NOT COME TO THE OFFICE FOR THIS APPOINTMENT. In preparation for this check, please ensure your monitor box is working appropriately. If your monitor requires you to send a transmission, please make sure it is sent by 11:00AM on this day so we can have it processed and resulted to your doctor without delay.   If you have a question or problem with the monitor box, please contact your respective company:   1007 [facility]/[employer] - [phone]  [employer]/Home Monitoring - [phone]  [employer] - [phone]  [employer]/Critical access hospital - [phone]  If you have any further questions or need to move this appointment, we are happy to help and can be reached at [phone]. Discharge Orders None A check suzanne indicates which time of day the medication should be taken. My Medications CHANGE how you take these medications Instructions Each Dose to Equal  
 Morning Noon Evening Bedtime  
 ferrous gluconate 324 mg (38 mg iron) tablet What changed:  additional instructions Your last dose was: Your next dose is: TAKE 1 TAB BY MOUTH DAILY (BEFORE BREAKFAST). INDICATIONS: IRON DEFICIENCY ANEMIA  
     
   
   
   
  
 loratadine 10 mg tablet Commonly known as:  Mae Canavan What changed:  See the new instructions. Your last dose was: Your next dose is: TAKE 1 TAB BY MOUTH DAILY. * oxyCODONE IR 10 mg Tab immediate release tablet Commonly known as:  Mamta Enriquez What changed:  Another medication with the same name was added. Make sure you understand how and when to take each. Your last dose was: Your next dose is: Take 1 Tab by mouth every four (4) hours as needed. Max Daily Amount: 60 mg.  
 10 mg  
    
   
   
   
  
 * oxyCODONE IR 5 mg immediate release tablet Commonly known as:  Mamta Enriquez What changed: You were already taking a medication with the same name, and this prescription was added. Make sure you understand how and when to take each. Your last dose was: Your next dose is: Take 1-2 Tabs by mouth every six (6) hours as needed. Max Daily Amount: 40 mg.  
 5-10 mg  
    
   
   
   
  
 * warfarin 5 mg tablet Commonly known as:  COUMADIN  
 What changed:  Another medication with the same name was added. Make sure you understand how and when to take each. Your last dose was: Your next dose is: Take 1 Tab by mouth five (5) days a week. 5 mg * warfarin 2.5 mg tablet Commonly known as:  COUMADIN What changed:  Another medication with the same name was added. Make sure you understand how and when to take each. Your last dose was: Your next dose is: Take 1 Tab by mouth two (2) days a week. 2.5 mg  
    
   
   
   
  
 * warfarin 5 mg tablet Commonly known as:  COUMADIN What changed: You were already taking a medication with the same name, and this prescription was added. Make sure you understand how and when to take each. Your last dose was: Your next dose is: Take 1 Tab by mouth five (5) days a week. Indications: THROMBOEMBOLISM DUE TO PROSTHETIC HEART VALVES  
 5 mg * warfarin 2.5 mg tablet Commonly known as:  COUMADIN Start taking on:  5/19/2018 What changed: You were already taking a medication with the same name, and this prescription was added. Make sure you understand how and when to take each. Your last dose was: Your next dose is: Take 1 Tab by mouth two (2) days a week. 2.5 mg  
    
   
   
   
  
 * Notice: This list has 6 medication(s) that are the same as other medications prescribed for you. Read the directions carefully, and ask your doctor or other care provider to review them with you. CONTINUE taking these medications Instructions Each Dose to Equal  
 Morning Noon Evening Bedtime  
 acetaminophen 325 mg tablet Commonly known as:  TYLENOL Your last dose was: Your next dose is: Take 650 mg by mouth every four (4) hours as needed for Pain.   
 650 mg  
    
   
   
   
  
 * albuterol 2.5 mg /3 mL (0.083 %) nebulizer solution Commonly known as:  PROVENTIL VENTOLIN Your last dose was: Your next dose is:    
   
   
 3 mL by Nebulization route every four (4) hours as needed for Wheezing. 2.5 mg  
    
   
   
   
  
 * albuterol 90 mcg/actuation inhaler Commonly known as:  VENTOLIN HFA Your last dose was: Your next dose is:    
   
   
 2 puffs qid prn  
     
   
   
   
  
 carvedilol 3.125 mg tablet Commonly known as:  Alysha Awe Your last dose was: Your next dose is: Take 1 Tab by mouth two (2) times daily (with meals). 3.125 mg  
    
   
   
   
  
 cholecalciferol (VITAMIN D3) 5,000 unit Tab tablet Commonly known as:  VITAMIN D3 Your last dose was: Your next dose is: Take 1 Tab by mouth daily. 5000 Units  
    
   
   
   
  
 escitalopram oxalate 10 mg tablet Commonly known as:  Mundo Rash Your last dose was: Your next dose is: TAKE 1 TAB BY MOUTH DAILY. INDICATIONS: ANXIETY WITH DEPRESSION  
     
   
   
   
  
 fluticasone 50 mcg/actuation nasal spray Commonly known as:  Putnam Public Your last dose was: Your next dose is: 2 Sprays by Both Nostrils route daily as needed. 2 Spray  
    
   
   
   
  
 furosemide 40 mg tablet Commonly known as:  LASIX Your last dose was: Your next dose is: Take 2 Tabs by mouth two (2) times a day. 80 mg  
    
   
   
   
  
 isosorbide mononitrate ER 30 mg tablet Commonly known as:  IMDUR Your last dose was: Your next dose is: TAKE 1 TAB BY MOUTH EVERY MORNING. mometasone-formoterol 200-5 mcg/actuation HFA inhaler Commonly known as:  Lazara Dalton Your last dose was: Your next dose is:    
   
   
 2 puffs bid, rinse mouth after use. nitroglycerin 0.4 mg SL tablet Commonly known as:  NITROSTAT Your last dose was: Your next dose is: 0.4 mg by SubLINGual route every five (5) minutes as needed. 0.4 mg OXYGEN-AIR DELIVERY SYSTEMS Your last dose was: Your next dose is:    
   
   
 3 L by Nasal route continuous. 3 L  
    
   
   
   
  
 sacubitril-valsartan 24-26 mg tablet Commonly known as:  ENTRESTO Your last dose was: Your next dose is: Take 1 Tab by mouth two (2) times a day. INDICATIONS: CHRONIC HEART FAILURE  
 1 Tab  
    
   
   
   
  
 simvastatin 20 mg tablet Commonly known as:  ZOCOR Your last dose was: Your next dose is: TAKE 1 TABLET BY MOUTH EVERY DAY  
     
   
   
   
  
 traMADol 50 mg tablet Commonly known as:  ULTRAM  
   
Your last dose was: Your next dose is: TAKE 1 TABLET BY MOUTH EVERY 4 HOURS AS NEEDED  
     
   
   
   
  
 umeclidinium 62.5 mcg/actuation inhaler Commonly known as:  INCRUSE ELLIPTA Your last dose was: Your next dose is: Take 1 Puff by inhalation daily. Indications: j44.9  
 1 Puff * Notice: This list has 2 medication(s) that are the same as other medications prescribed for you. Read the directions carefully, and ask your doctor or other care provider to review them with you. STOP taking these medications ASPIR-81 81 mg tablet Generic drug:  aspirin delayed-release  
   
  
 bisacodyl 10 mg suppository Commonly known as:  DULCOLAX Where to Get Your Medications These medications were sent to [facility] [address], [place] [address] Phone:  [phone]  
  carvedilol 3.125 mg tablet  
 warfarin 2.5 mg tablet  
 warfarin 5 mg tablet Information on where to get these meds will be given to you by the nurse or doctor. ! Ask your nurse or doctor about these medications  
  oxyCODONE IR 5 mg immediate release tablet Opioid Education Prescription Opioids: What You Need to Know: 
 
Prescription opioids can be used to help relieve moderate-to-severe pain and are often prescribed following a surgery or injury, or for certain health conditions. These medications can be an important part of treatment but also come with serious risks. Opioids are strong pain medicines. Examples include hydrocodone, oxycodone, fentanyl, and morphine. Heroin is an example of an illegal opioid. It is important to work with your health care provider to make sure you are getting the safest, most effective care. WHAT ARE THE RISKS AND SIDE EFFECTS OF OPIOID USE? Prescription opioids carry serious risks of addiction and overdose, especially with prolonged use. An opioid overdose, often marked by slow breathing, can cause sudden death. The use of prescription opioids can have a number of side effects as well, even when taken as directed. · Tolerance-meaning you might need to take more of a medication for the same pain relief · Physical dependence-meaning you have symptoms of withdrawal when the medication is stopped. Withdrawal symptoms can include nausea, sweating, chills, diarrhea, stomach cramps, and muscle aches. Withdrawal can last up to several weeks, depending on which drug you took and how long you took it. · Increased sensitivity to pain · Constipation · Nausea, vomiting, and dry mouth · Sleepiness and dizziness · Confusion · Depression · Low levels of testosterone that can result in lower sex drive, energy, and strength · Itching and sweating RISKS ARE GREATER WITH:      
· History of drug misuse, substance use disorder, or overdose · Mental health conditions (such as depression or anxiety) · Sleep apnea · Older age (72 years or older) · Pregnancy Avoid alcohol while taking prescription opioids. Also, unless specifically advised by your health care provider, medications to avoid include: · Benzodiazepines (such as Xanax or Valium) · Muscle relaxants (such as Soma or Flexeril) · Hypnotics (such as Ambien or Lunesta) · Other prescription opioids KNOW YOUR OPTIONS Talk to your health care provider about ways to manage your pain that don't involve prescription opioids. Some of these options may actually work better and have fewer risks and side effects. Options may include: 
· Pain relievers such as acetaminophen, ibuprofen, and naproxen · Some medications that are also used for depression or seizures · Physical therapy and exercise · Counseling to help patients learn how to cope better with triggers of pain and stress. · Application of heat or cold compress · Massage therapy · Relaxation techniques Be Informed Make sure you know the name of your medication, how much and how often to take it, and its potential risks & side effects. IF YOU ARE PRESCRIBED OPIOIDS FOR PAIN: 
· Never take opioids in greater amounts or more often than prescribed. Remember the goal is not to be pain-free but to manage your pain at a tolerable level. · Follow up with your primary care provider to: · Work together to create a plan on how to manage your pain. · Talk about ways to help manage your pain that don't involve prescription opioids. · Talk about any and all concerns and side effects. · Help prevent misuse and abuse. · Never sell or share prescription opioids · Help prevent misuse and abuse. · Store prescription opioids in a secure place and out of reach of others (this may include visitors, children, friends, and family).  
· Safely dispose of unused/unwanted prescription opioids: Find your community drug take-back program or your pharmacy mail-back program, or flush them down the toilet, following guidance from the Food and Drug Administration (www.fda.gov/Drugs/ResourcesForYou). · Visit www.cdc.gov/drugoverdose to learn about the risks of opioid abuse and overdose. · If you believe you may be struggling with addiction, tell your health care provider and ask for guidance or call Maria C Rasmussen at 2-187-589-QFKW. Discharge Instructions None ice Announcement We are excited to announce that we are making your provider's discharge notes available to you in ice. You will see these notes when they are completed and signed by the physician that discharged you from your recent hospital stay. If you have any questions or concerns about any information you see in ice, please call the Health Information Department where you were seen or reach out to your Primary Care Provider for more information about your plan of care. Introducing [facility]! [employer] introduces ice patient portal. Now you can access parts of your medical record, email your doctor's office, and request medication refills online. 1. In your internet browser, go to [email or web address] 2. Click on the First Time User? Click Here link in the Sign In box. You will see the New Member Sign Up page. 3. Enter your ice Access Code exactly as it appears below. You will not need to use this code after youve completed the sign-up process. If you do not sign up before the expiration date, you must request a new code. · ice Access Code: [email or web address] Expires: 7/18/2018 11:05 AM 
 
4. Enter the last four digits of your Social Security Number (xxxx) and Date of Birth (mm/dd/yyyy) as indicated and click Submit. You will be taken to the next sign-up page. 5. Create a ice ID. This will be your ice login ID and cannot be changed, so think of one that is secure and easy to remember. 6. Create a PROTEGO password. You can change your password at any time. 7. Enter your Password Reset Question and Answer. This can be used at a later time if you forget your password. 8. Enter your e-mail address. You will receive e-mail notification when new information is available in [address] 9. Click Sign Up. You can now view and download portions of your medical record. 10. Click the Download Summary menu link to download a portable copy of your medical information. If you have questions, please visit the Frequently Asked Questions section of the PROTE[employer] website. Remember, PROTEGO is NOT to be used for urgent needs. For medical emergencies, dial 911. Now available from your iPhone and Android! Introducing Shaan Roger As a [facility] patient, I wanted to make you aware of our electronic visit tool called Shaan Roger. [employer]TagaPet allows you to connect within minutes with a medical provider 24 hours a day, seven days a week via a mobile device or tablet or logging into a secure website from your computer. You can access Shaan Roger from anywhere in the [place]. A virtual visit might be right for you when you have a simple condition and feel like you just dont want to get out of bed, or cant get away from work for an appointment, when your regular [facility] provider is not available (evenings, weekends or holidays), or when youre out of town and need minor care. Electronic visits cost only $49 and if the [employer] provider determines a prescription is needed to treat your condition, one can be electronically transmitted to a nearby pharmacy*. Please take a moment to enroll today if you have not already done so. The enrollment process is free and takes just a few minutes. To enroll, please download the PayClip gama to your tablet or phone, or visit [email or web address] to enroll on your computer. And, as an [address] [place] [address] patient with a [employer] account, the results of your visits will be scanned into your electronic medical record and your primary care provider will be able to view the scanned results. We urge you to continue to see your [facility] provider for your ongoing medical care. And while your primary care provider may not be the one available when you seek a Shaan Coyerenfin virtual visit, the peace of mind you get from getting a real diagnosis real time can be priceless. For more information on Shaan Catherinefin, view our Frequently Asked Questions (FAQs) at [email or web address]. Sincerely, 
 
Lucia Zarco MD 
Chief Medical Officer Dina8 Isha Newell *:  certain medications cannot be prescribed via Shaan Coyerenfin Providers Seen During Your Hospitalization Provider Specialty Primary office phone [address] , MD Physical Medicine and Rehab [phone] Your Primary Care Physician (PCP) Primary Care Physician Office Phone Office Fax Semaj Farooq [phone] You are allergic to the following Allergen Reactions Sulfa (Sulfonamide Antibiotics) Hives Aspirin Nausea Only Cannot take uncoated aspirin Recent Documentation Height Weight Breastfeeding? BMI OB Status Smoking Status 1.575 m 101.2 kg No 40.79 kg/m2 Postmenopausal Former Smoker Emergency Contacts Name Discharge Info Relation Home Work Mobile Teodoro Llanes DISCHARGE CAREGIVER [3] Daughter [21]   [phone] Patient Belongings The following personal items are in your possession at time of discharge: 
  Dental Appliances:  (patient left dentures at home)  Visual Aid: Glasses      Home Medications: Sent home   Jewelry: None  Clothing:  (dtr to bring)    Other Valuables: None Please provide this summary of care documentation to your next provider. Signatures-by signing, you are acknowledging that this After Visit Summary has been reviewed with you and you have received a copy. Patient Signature:  ____________________________________________________________ Date:  ____________________________________________________________  
  
Claudene Born Provider Signature:  ____________________________________________________________ Date:  ____________________________________________________________

## 2018-05-02 NOTE — PROGRESS NOTES
End Of Shift Functional Summary, Nursing      TOILETING:    Does patient need assist with adjusting pants up or down and/or pericare? yes  If yes, please indicate what the patient needs help with:  Pants up and down  Pt uses walker. Does the patient require extra time? yes  Does the patient require standby assistance? no  Does the patient require contact guard assistance? yes  Does the patient require more than one staff member for assistance? no    TOILET TRANSFER:    Pt requires minimal assistance. Pt uses walker. Does the patient require extra time? yes  Does the patient require contact guard assistance? yes  Does the patient require more than one staff member for assistance? no    BLADDER:    Pt does not have a medina catheter that staff manages. Pt does not take medication. If so, please indicate which medication:    Pt is continent. of bladder and voids in toilet Pt has had 0 bladder accidents during this shift   (An accident is when the episode is not contained in a brief AND/OR the clothing/linen requires changing/cleaning up.)    BOWEL:  Pt does take medication. Pt is continent of bowel and uses toilet. Pt has had 0 bowel accidents during this shift requiring minimal assistance from staff to clean up. (An accident is when the episode is not contained in a brief AND/OR the clothing/linen requires changing/cleaning up.)    BED/CHAIR TRANSFER  Pt requires minimal assistance. Pt uses walker  Does the patient require extra time? yes  Does the patient require contact guard assistance? yes  Does the patient require more than one staff member for assistance? yes      EATING  Pt requires no assistance. Pt does not wear dentures. TUBE FEEDINGS:  Pt does not  receive nutrition through tube feedings. Patient requires moderate assistance with feedings. Documentation reviewed and plan of care discussed/reviewed with   patient during the shift.

## 2018-05-02 NOTE — PROGRESS NOTES
Patient will discharge to 9th Floor for acute rehab today. No further discharge planning needs noted at this time. Case Management will remain available to assist as needed.     Care Management Interventions  Transition of Care Consult (CM Consult): Discharge Planning  Discharge Durable Medical Equipment: No  Physical Therapy Consult: Yes  Occupational Therapy Consult: Yes  Speech Therapy Consult: No  Current Support Network: Own Home, Family Lives Nearby  Confirm Follow Up Transport: Family  Plan discussed with Pt/Family/Caregiver: Yes  Freedom of Choice Offered: Yes  Discharge Location  Discharge Placement: Rehab hospital/unit acute

## 2018-05-02 NOTE — PROGRESS NOTES
Sleeping arouses easily. Fingers on right hand taped. Left arm splint intact. No complaints at present.

## 2018-05-02 NOTE — PROGRESS NOTES
Assessment completed, pt is A/O x 4, c/o pain to hands. Pt given Tramadol 50 mg po. Pt states oxycodone does not work for her. Moises FX to hands has a splint. Pt on 2 L N/C, sats 95%. she has a black eye from fall at home. Pt has to be fed. HGB 8.5, pt previously has had 1 unit of blood. Pt takes Coumadin 5 mg. Pt is Mod assist x 1, pt is very weak. Pt has squeeze pad for nurse call light. Pt encourage to call for any needs.

## 2018-05-02 NOTE — PROGRESS NOTES
[facility] OCCUPATIONAL THERAPY INITIAL EVALUATION    Time In: 5168  Time Out: 1345    Precautions: Falls, Confused and no lifting with BUE    Pain: Pt had pain in R shoulder with movement. Consulted with Dr. Tanya Boyer to get an X-Ray. History of Presenting Illness (per previous reports): Patient is a 77 y.o. female who presents to the ER after a fall at home in which she hit her head. Denies syncope or LOC. She states that her foot just got caught on a step. She hit the left side of her head and does have a large bruise around left eye. Has pain in hands and knees. Reports nausea, no vomiting, and a headache. She is on coumadin with an INR of 2 for mechanical valve. Head CT shows a small subdural hematoma. ER discussed with cardiology and neurosurgery; they have decided to reverse anti-coagulation to less than 2 with FFP to minimize subdural bleeding. Recommend repeat CT in about 6 hours. xrays also show nondisplaced fracture of shaft of fifth metacarpal bone of left hand. Splint has been applied.     Past Medical History/ Co-morbidities:   Past Medical History:   Diagnosis Date    Anticoagulated on Coumadin 7/9/2013    S/P AVR     Benign hypertension     controlled    CAD (coronary artery disease) 1/20/2013 5/8/14 PCI LAD with stent placed     Cardiomyopathy ([address]     CHF (congestive heart failure) ([address] 2/17/2017    Chronic left-sided low back pain without sciatica 6/15/2016    Chronic respiratory failure ([address] 4/2/2014    CKD (chronic kidney disease) stage 3, GFR 30-59 ml/min 7/10/2013    COPD (chronic obstructive pulmonary disease) ([address] 4/2/2014    Essential hypertension, benign 1/20/2013    HLD (hyperlipidemia)     ICD (implantable cardioverter-defibrillator) in place 10/2/2014    Biotronik single-chamber ICD implantation 10/20/14     Iron deficiency anemia due to chronic blood loss 7/29/2009    MDS (myelodysplastic syndrome) ([address] 12/17/2011    Procrit started in August, 2011 12/18/11 Procrit weekly and Iron stores. 5-12 12-13-12 good response to 3 weekly procrit did not need it last time  3-7-13 Pt doing well. Just wanted a \"check-up. \" Responding to Procrit every three weeks. 8-29-13 patient has missed some injections on recently restarted hemoglobin only issue , takes oral iron iron stores each time       REJI (obstructive sleep apnea)-cpap 4/2/2014    Osteoarthritis     Osteopenia     Quadrantanopia     Secondary pulmonary hypertension     Tachycardia     Rapid H/B     Visual complaint 12/14/2015       Patient's Goal: \"get better and better so I might live on my own the rest of my life\"    Previous Level of Function: Pt was on 3 LPM of O2 during the day. Pt was living alone with [facility] coming 5 morning a week. Pt was sponge bathing daily. Pt would heat up meals. Pt no longer drives.      [address] [facility]   Lives Alone Yes   Support Systems Child(alysha), Home care staff   Shower Situation Tub/Shower combination   Current DME Shower chair, Commode, bedside, Wheelchair   Stairs to [employer] [address]     Upper Extremity Function   LUE RUE   FMC  Impaired  Impaired   GMC  Intact  Impaired      LUE RUE   Shoulder Flexion 4- 2-   Shoulder Extension  4- 2-   Shoulder Abduction 4- 2-   Shoulder Adduction 4- 3   Elbow Flexion 4- 3   Elbow Extension  4- 3     (Decreased)  (Decreased)   0/5 No palpable muscle contraction  1/5 Palpable muscle contraction, no joint movement  2-/5 Less than full range of motion in gravity eliminated position  2/5 Able to complete full range of motion in gravity eliminated position  2+/5 Able to initiate movement against gravity  3-/5 More than half but not full range of motion against gravity  3/5 Able to complete full range of motion against gravity  3+/5 Completes full range of motion against gravity with minimal resistance  4-/5 Completes full range of motion against gravity with minimal-moderate resistance  4/5 Completes full range of motion against gravity with moderate resistance  4+/5 Completes full range of motion against gravity with moderate-maximum resistance  5/5 Completes full range of motion against gravity with maximum resistance      Functional Mobility   Performance   Sitting: Static Normal    Sitting: Dynamic Normal   Standing: Static Fair   Standing: Dynamic Poor (constant support)   Sit to Stand Assistance Moderate assistance   Transfer Assist Score 3   Transfer Type SPT without device     Cognition   Performance Comments   Orientation Level Oriented to person, Oriented to time, Oriented to situation, Oriented to place    Comprehension Level 4 (Understands basic needs 75-89%, may need words repeated.) Mode: Auditory  Hearing Aid:None  Glasses:Glasses   Expression (Native Language) 6 Mode: Verbal  Mild difficulty with complex   Social Interaction/Pragmatics 5 Needs supervision only under stressful or unfamiliar conditions, interacts appropriately 90% of the time, needs monitoring or encouragement for participation or interaction. Problem Solving 3 Solves basic problems 50-74% of the time. Memory 4 Recalled 2/3 words       Activities of Daily Living    Score Comments   Upper Body  Dressing/Undressing 1 Items Applied:Pullover (4 steps)  Dependent, try to go through head hole instead of bottom   Lower Body Dressing/Undressing 1 Items Applied:Underpants (3 steps), Elastic waist pants (3 steps)  A for all parts     Vision/Perception: Pt reports self-diagnosed macular degeneration. Session: Pt was in recliner and agreeable to tx. Pt's performance with ADL is reflected in above chart. Did MMT, but limited somewhat by pain and splint. Recommended an x-ray of R shoulder to Dr. Kwabena Childress secondary to decreased AROM and PROM and shoulder feeling displaced upon movement. Pt transferred to w/c with mod A. Pt was left in room with all needs within reach. Interdisciplinary Communication: PT Indra Mataumble on d/c.       Patient/Family Education: Patient was/were educated On the role of OT, On POC and On IRC expectations. Problem List: [facility] [place], [address], Activity Tolerance, Decreased ROM in R shoulder, Pain, Standing Balance and Cognition    Functional Limitations: ADL, IADL, Functional Transfers and Functional Mobility    Goals: Please see Care Plan    OT order received and chart reviewed. OT orders have been acknowledged. Patient will benefit from skilled OT services to address ADL, functional transfers, UE strength, UE coordination, balance, cognition and activity tolerance to maximize functional performance with daily self-care tasks and functional mobility. Treatment is likely to include ADL, Balance, Strength, Activity tolerance, Cognitive, DME, AE, Family  and Safety awareness training to increase independence with self-care. Patient will be seen for 1.5-2 hours of skilled OT services 5-6 days a week as appropriate. Initate POC.      [facility]  5/2/2018

## 2018-05-02 NOTE — PROGRESS NOTES
Dual assessment performed by Adrienne Saini RN and Mauro Valente RN. Pt has left red eye and bruising to right hip from a fall. Pt has left and right hand fracture. Splint to hand and fingers.  Pt has trilogy in room and respiratory treatment

## 2018-05-02 NOTE — PROGRESS NOTES
TRANSFER - IN REPORT:    Verbal report received from Gaurang Alvarado RN on Matthew Hernandez  being received from [facility] 8th floor for routine progression of care      Report consisted of patients Situation, Background, Assessment and   Recommendations(SBAR). Information from the following report(s) SBAR, Kardex and MAR was reviewed with the receiving nurse. Opportunity for questions and clarification was provided. Assessment completed upon patients arrival to unit and care assumed. Syed Robledo  [phone] [id number] [place] [address]  Phone: (   )    -  Fax: (   )    -

## 2018-05-02 NOTE — H&P
[address], [address] [facility]  Tel: [phone]  Fax: [phone]      HISTORY AND PHYSICAL  IRC       Admit Date: 5/2/2018    Primary Care Physician: Romie Duong MD  Specialty Group: Pulmonary, Orthopedics, Hospitalists    Chief Complaint : Gait dysfunction secondary to below. Admit Diagnosis: Debillity ; Physical debility   S/p ortho trauma  Acute Rehab Dx:  Gait impairment/ gait dysfunction  Debility    deconditioning  Mobility and ambulation deficits  Self Care/ADL deficits    Medical Dx:  Past Medical History:   Diagnosis Date    Anticoagulated on Coumadin 7/9/2013    S/P AVR     Benign hypertension     controlled    CAD (coronary artery disease) 1/20/2013 5/8/14 PCI LAD with stent placed     Cardiomyopathy ([address]     CHF (congestive heart failure) ([address] 2/17/2017    Chronic left-sided low back pain without sciatica 6/15/2016    Chronic respiratory failure ([address] 4/2/2014    CKD (chronic kidney disease) stage 3, GFR 30-59 ml/min 7/10/2013    COPD (chronic obstructive pulmonary disease) ([address] 4/2/2014    Essential hypertension, benign 1/20/2013    HLD (hyperlipidemia)     ICD (implantable cardioverter-defibrillator) in place 10/2/2014    Biotronik single-chamber ICD implantation 10/20/14     Iron deficiency anemia due to chronic blood loss 7/29/2009    MDS (myelodysplastic syndrome) ([address] 12/17/2011    Procrit started in August, 2011 12/18/11 Procrit weekly and Iron stores. 5-12 12-13-12 good response to 3 weekly procrit did not need it last time  3-7-13 Pt doing well. Just wanted a \"check-up. \" Responding to Procrit every three weeks.  8-29-13 patient has missed some injections on recently restarted hemoglobin only issue , takes oral iron iron stores each time       REJI (obstructive sleep apnea)-cpap 4/2/2014    Osteoarthritis     Osteopenia     Quadrantanopia     Secondary pulmonary hypertension     Tachycardia     Rapid H/B     Visual complaint 12/14/2015       Date of Evaluation:  May 2, 2018    HPI: Gaurang Quiroz is a 77 y.o. female patient at [address] [place] [address] Road who was admitted on 5/2/2018  by Roya Browne MD with below mentioned medical history ,is being seen for Physical Medicine and Rehabilitation. Ms. Pierce Escobar is a normally functionally independent, ambidextrous 67yo AA femal with hx of chronic coumadin therapy due to a mechanical aortic valve placement in 2013, CAD s/p PCI and stent to LAD in 2013, CKD, morbid obesity, Htn, COPD, RE[facility]I [facility]OGY at [facility] secondary pulmonology, OA and  MDS with chronic anemia followed by oncology, Dr Susannah No, and received procrit monthy,  who was admitted thru the ED on 4/27 after she tripped entering the two steps into her home. She landed on her hands and knees. Head CT initially questioned the presence of a SDH but was later determined to be artifact. Xrays of the left hand showed an oblique fracture the distal fifth metacarpal metaphysis and an avulsion fracture at the base of the third proximal phalanx. Xray of the right hand revealed dorsal soft tissue swelling with a distal 5th proximal phalanx fracture. She has been seen by Ortho and her left hand is splinted and her last two digits of her right hand are rachel taped. She is not to lift anything with either hand.    xrays of the knees were negative but she has noted bruising, abrasion and swelling of the right knee. She has ecchymosis around the left eye with injected sclera. Her hgb on presentation was 8.6, but dropped to 7.4 on 4/28  And then 7.0 and she received a blood transfusion. Her hgb is currently 8.5 from 7.9 yesterday. Her  Creatinine has leveled out and her GFR 29 has improved to 31 on 5/2    Physical therapy was initiated and patient was starting to mobilize. However, she shows significant functional deficits due to prolonged immobility and hospitalization.    Our service was consulted for rehab needs and we recommended inpatient hospital rehabilitation is reasonable and necessary due to ongoing acute medical issues which have not changed since initial pre-admission evaluation. and rehab needs still likely best managed in IRU setting. The patient was evaluated by [facility] admissions coordinators. I reviewed the preadmission screening and have approved for admission to the [facility]. The patient was cleared for transfer to rehab today. Patient continues to have ongoing  medical issues outlined above requiring physician medical supervision and functional deficits which would benefit from continued intensive therapies. Current Level of Function: (evaluated by acute therapy staff, with bed mobility, transfers, balance personally observed post-admission in the IRF setting minutes prior to submission of document) min assist with bed mobility, CGA/min A to stand, amb 10ft with assist of two CGA .  She is mod to max assist with ADLs    Prior Level of Function/Home Situation:     Home Environment: Apartment  # Steps to Enter: 2 (pt states there are other entrances without steps)  One/Two Story Residence: One story  Living Alone: Yes  Support Systems: Child(alysha) (family comes to check on her every day)  Patient Expects to be Discharged to[de-identified] Apartment  Current DME Used/Available at Home:  (pt does not use cane/walker at home)  Prior Level of Function/Work/Activity:  [place] with ADLs      Past Medical History:   Diagnosis Date    Anticoagulated on Coumadin 7/9/2013    S/P AVR     Benign hypertension     controlled    CAD (coronary artery disease) 1/20/2013 5/8/14 PCI LAD with stent placed     Cardiomyopathy ([address]     CHF (congestive heart failure) ([address] 2/17/2017    Chronic left-sided low back pain without sciatica 6/15/2016    Chronic respiratory failure ([address] 4/2/2014    CKD (chronic kidney disease) stage 3, GFR 30-59 ml/min 7/10/2013    COPD (chronic obstructive pulmonary disease) [facility] 2014    Essential hypertension, benign 2013    HLD (hyperlipidemia)     ICD (implantable cardioverter-defibrillator) in place 10/2/2014    Biotronik single-chamber ICD implantation 10/20/14     Iron deficiency anemia due to chronic blood loss 2009    MDS (myelodysplastic syndrome) ([facility] 2011    Procrit started in 11 Procrit weekly and Iron stores. 12 good response to 3 weekly procrit did not need it last time  3-7-13 Pt doing well. Just wanted a \"check-up. \" Responding to Procrit every three weeks. 13 patient has missed some injections on recently restarted hemoglobin only issue , takes oral iron iron stores each time       REJI (obstructive sleep apnea)-cpap 2014    Osteoarthritis     Osteopenia     Quadrantanopia     Secondary pulmonary hypertension     Tachycardia     Rapid H/B     Visual complaint 2015      Past Surgical History:   Procedure Laterality Date    CARDIAC CATHETERIZATION      HX AORTIC VALVE REPLACEMENT  2005    mechanical valve     HX  SECTION      HX CORONARY STENT PLACEMENT  May, 2014    STEMI with Stent placement.     HX ENDOSCOPY  2009    EGD    HX HEART CATHETERIZATION      HX PACEMAKER  10/2/2014    Biotronik ICD    HX TUBAL LIGATION      IR BX BONE MARROW DIAGNOSTIC  2011     Allergies   Allergen Reactions    Sulfa (Sulfonamide Antibiotics) Hives    Aspirin Nausea Only     Cannot take uncoated aspirin      Family History   Problem Relation Age of Onset    Heart Disease Mother      CHF    Hypertension Mother     Kidney Disease Mother     Heart Disease Father      CHF    Lung Disease Father     Diabetes Father     Cancer Father      prostate    Hypertension Father     Heart Attack Father     Heart Disease Maternal Aunt     Diabetes Brother     Coronary Artery Disease Other     Breast Cancer Neg Hx       Social History   Substance Use Topics    Smoking status: Former Smoker     Packs/day: 0.25     Years: 42.00     Types: Cigarettes     Start date: 10/1/1956     Quit date: 5/1/2014    Smokeless tobacco: Never Used    Alcohol use No      Current Facility-Administered Medications   Medication Dose Route Frequency    0.9% sodium chloride infusion 250 mL  250 mL IntraVENous PRN    0.9% sodium chloride infusion 250 mL  250 mL IntraVENous PRN    acetaminophen (TYLENOL) tablet 650 mg  650 mg Oral Q4H PRN    bisacodyl (DULCOLAX) tablet 5 mg  5 mg Oral DAILY PRN    diphenhydrAMINE (BENADRYL) capsule 25 mg  25 mg Oral Q4H PRN    LORazepam (ATIVAN) tablet 1 mg  1 mg Oral BID PRN    naloxone (NARCAN) injection 0.4 mg  0.4 mg IntraVENous PRN    prochlorperazine (COMPAZINE) injection 5 mg  5 mg IntraVENous Q8H PRN    sodium chloride (NS) flush 5-10 mL  5-10 mL IntraVENous Q8H    sodium chloride (NS) flush 5-10 mL  5-10 mL IntraVENous PRN    albuterol (PROVENTIL VENTOLIN) nebulizer solution 2.5 mg  2.5 mg Nebulization Q4H PRN    bisacodyl (DULCOLAX) suppository 10 mg  10 mg Rectal DAILY PRN    budesonide (PULMICORT) 500 mcg/2 ml nebulizer suspension  500 mcg Nebulization BID RT    And    albuterol (PROVENTIL VENTOLIN) nebulizer solution 2.5 mg  2.5 mg Nebulization Q6HWA RT    carvedilol (COREG) tablet 3.125 mg  3.125 mg Oral BID WITH MEALS    [START ON 5/3/2018] escitalopram oxalate (LEXAPRO) tablet 10 mg  10 mg Oral DAILY    furosemide (LASIX) tablet 80 mg  80 mg Oral BID    loratadine (CLARITIN) tablet 10 mg  10 mg Oral DAILY PRN    oxyCODONE IR (ROXICODONE) tablet 10 mg  10 mg Oral Q4H PRN    [START ON 5/3/2018] polyethylene glycol (MIRALAX) packet 17 g  17 g Oral DAILY    sacubitril-valsartan (ENTRESTO) 24-26 mg tablet 1 Tab  1 Tab Oral BID    [START ON 5/3/2018] senna (SENOKOT) tablet 8.6 mg  1 Tab Oral DAILY    simvastatin (ZOCOR) tablet 20 mg  20 mg Oral QHS    [START ON 5/3/2018] tiotropium (SPIRIVA) inhalation capsule 18 mcg  1 Cap Inhalation DAILY    traMADol (ULTRAM) tablet 50 mg  50 mg Oral Q6H PRN    [START ON 5/4/2018] warfarin (COUMADIN) tablet 2.5 mg  2.5 mg Oral Once per day on Mon Fri    warfarin (COUMADIN) tablet 5 mg  5 mg Oral Once per day on Sun Tue Wed Thu Sat       Review of Systems:  A comprehensive review of systems was negative except for: Constitutional: positive for fatigue  Eyes: positive for irritation  Ears, nose, mouth, throat, and face: positive for hearing loss, nasal congestion, snoring and facial trauma  Respiratory: positive for asthma, dyspnea on exertion, emphysema or REJI  Cardiovascular: positive for dyspnea, palpitations, fatigue, orthopnea, lower extremity edema, dyspnea on exertion  Gastrointestinal: positive for reflux symptoms and constipation  Genitourinary: positive for frequency and nocturia  Hematologic/lymphatic: positive for easy bruising  Musculoskeletal: positive for myalgias, arthralgias, stiff joints, back pain and muscle weakness  Neurological: positive for weakness. Objective:     Visit Vitals    /52 (BP 1 Location: Left leg)    Pulse 77    Temp 97.2 °F (36.2 °C)    Resp 18    SpO2 95%        Physical Exam:   Psych: Patient was oriented to person, place, and time. Without hallucinations, abnormal affect or abnormal behaviors during the examination. Appears a bit uncomfortable due to pain   General:   Alert, appears older than stated age, No acute distress. HEENT:  Normocephalic, EOMI, facial symmetry  Intact. Oral mucosa pink and moist. No nasal discharge. Lungs:  CTA Bilaterally,Respiration even and unlabored   No apparent use of accessory muscles for respiration. Decreased at bases   Heart:  Regular rate and rhythm, S1, S2, No obvious murmur, click, rub or gallop. Genitourinary: defered   Abdomen:  Soft, non-tender to palpation in all four quadrants. Bowel sounds present. No obvious masses palpated.     Neuromuscular:  PERRL, EOMI  LUE     Shoulder abduction  4 /5 Elbow flexion:  4/5               Wrist extension:  4- /5              Moving fingers, decreased ; splint  RUE    Shoulder abduction:  2/5                Elbow flexion:  3/5    Wrist extension:  3/5   Finger flexion:hindered by pain    at least 3; rachel taped 4th and 5th digis  LLE     Hip flexion:  3- /5              Knee extension:  4 /5    Ankle dorsiflexion:   5/5   EHL:  5- /5   Ankle plantarflexion:   5/5        RLE     Hip flexion:  4- /5   Knee extension:   5-/5    Ankle dorsiflexion:   5/5   EHL:   5-/5   Ankle plantarflexion:   5/5  Sensory - intact  Plantars - down going  DTR muted  A bit confused   Skin:  Intact, dry, good skin turgor, age related changes present   Edema: 1+  Right knee bruised, mild effusion,  Slight swelling of hands, ecchymosis left eye, scleral hemmorhage, EOMI            Lab Review:    Recent Results (from the past 72 hour(s))   PROTHROMBIN TIME + INR    Collection Time: 04/30/18  6:36 AM   Result Value Ref Range    Prothrombin time 27.6 (H) 11.5 - 14.5 sec    INR 2.6     METABOLIC PANEL, BASIC    Collection Time: 04/30/18  6:36 AM   Result Value Ref Range    Sodium 140 136 - 145 mmol/L    Potassium 4.6 3.5 - 5.1 mmol/L    Chloride 97 (L) 98 - 107 mmol/L    CO2 38 (H) 21 - 32 mmol/L    Anion gap 5 (L) 7 - 16 mmol/L    Glucose 121 (H) 65 - 100 mg/dL    BUN 49 (H) 8 - 23 MG/DL    Creatinine 2.32 (H) 0.6 - 1.0 MG/DL    GFR est AA 27 (L) >60 ml/min/1.73m2    GFR est non-AA 22 (L) >60 ml/min/1.73m2    Calcium 8.6 8.3 - 10.4 MG/DL   CBC WITH AUTOMATED DIFF    Collection Time: 04/30/18  6:36 AM   Result Value Ref Range    WBC 7.3 4.3 - 11.1 K/uL    RBC 2.79 (L) 4.05 - 5.25 M/uL    HGB 8.0 (L) 11.7 - 15.4 g/dL    HCT 26.1 (L) 35.8 - 46.3 %    MCV 93.5 79.6 - 97.8 FL    MCH 28.7 26.1 - 32.9 PG    MCHC 30.7 (L) 31.4 - 35.0 g/dL    RDW 14.4 11.9 - 14.6 %    PLATELET 855 508 - 354 K/uL    MPV 10.8 10.8 - 14.1 FL    DF AUTOMATED      NEUTROPHILS 65 43 - 78 %    LYMPHOCYTES 25 13 - 44 %    MONOCYTES 7 4.0 - 12.0 %    EOSINOPHILS 3 0.5 - 7.8 %    BASOPHILS 0 0.0 - 2.0 %    IMMATURE GRANULOCYTES 0 0.0 - 5.0 %    ABS. NEUTROPHILS 4.7 1.7 - 8.2 K/UL    ABS. LYMPHOCYTES 1.8 0.5 - 4.6 K/UL    ABS. MONOCYTES 0.5 0.1 - 1.3 K/UL    ABS. EOSINOPHILS 0.2 0.0 - 0.8 K/UL    ABS. BASOPHILS 0.0 0.0 - 0.2 K/UL    ABS. IMM.  GRANS. 0.0 0.0 - 0.5 K/UL   MAGNESIUM    Collection Time: 04/30/18  6:36 AM   Result Value Ref Range    Magnesium 2.4 1.8 - 2.4 mg/dL   PHOSPHORUS    Collection Time: 04/30/18  6:36 AM   Result Value Ref Range    Phosphorus 4.3 (H) 2.3 - 3.7 MG/DL   PROTHROMBIN TIME + INR    Collection Time: 05/01/18  6:45 AM   Result Value Ref Range    Prothrombin time 30.0 (H) 11.5 - 14.5 sec    INR 2.9     METABOLIC PANEL, BASIC    Collection Time: 05/01/18  6:45 AM   Result Value Ref Range    Sodium 141 136 - 145 mmol/L    Potassium 4.7 3.5 - 5.1 mmol/L    Chloride 97 (L) 98 - 107 mmol/L    CO2 38 (H) 21 - 32 mmol/L    Anion gap 6 (L) 7 - 16 mmol/L    Glucose 114 (H) 65 - 100 mg/dL    BUN 55 (H) 8 - 23 MG/DL    Creatinine 2.17 (H) 0.6 - 1.0 MG/DL    GFR est AA 29 (L) >60 ml/min/1.73m2    GFR est non-AA 24 (L) >60 ml/min/1.73m2    Calcium 8.4 8.3 - 10.4 MG/DL   CBC W/O DIFF    Collection Time: 05/01/18  6:45 AM   Result Value Ref Range    WBC 6.9 4.3 - 11.1 K/uL    RBC 2.70 (L) 4.05 - 5.25 M/uL    HGB 7.9 (L) 11.7 - 15.4 g/dL    HCT 25.1 (L) 35.8 - 46.3 %    MCV 93.0 79.6 - 97.8 FL    MCH 29.3 26.1 - 32.9 PG    MCHC 31.5 31.4 - 35.0 g/dL    RDW 14.0 11.9 - 14.6 %    PLATELET 309 834 - 866 K/uL    MPV 10.8 10.8 - 14.1 FL   PROTHROMBIN TIME + INR    Collection Time: 05/02/18  7:50 AM   Result Value Ref Range    Prothrombin time 30.3 (H) 11.5 - 14.5 sec    INR 2.9     CBC W/O DIFF    Collection Time: 05/02/18  7:50 AM   Result Value Ref Range    WBC 8.4 4.3 - 11.1 K/uL    RBC 2.97 (L) 4.05 - 5.25 M/uL    HGB 8.5 (L) 11.7 - 15.4 g/dL    HCT 27.2 (L) 35.8 - 46.3 %    MCV 91.6 79.6 - 97.8 FL    MCH 28.6 26.1 - 32.9 PG    MCHC 31.3 (L) 31.4 - 35.0 g/dL    RDW 14.1 11.9 - 14.6 %    PLATELET 202 670 - 676 K/uL    MPV 10.3 (L) 10.8 - 53.7 FL   METABOLIC PANEL, BASIC    Collection Time: 05/02/18  7:50 AM   Result Value Ref Range    Sodium 143 136 - 145 mmol/L    Potassium 4.9 3.5 - 5.1 mmol/L    Chloride 100 98 - 107 mmol/L    CO2 37 (H) 21 - 32 mmol/L    Anion gap 6 (L) 7 - 16 mmol/L    Glucose 123 (H) 65 - 100 mg/dL    BUN 56 (H) 8 - 23 MG/DL    Creatinine 1.71 (H) 0.6 - 1.0 MG/DL    GFR est AA 38 (L) >60 ml/min/1.73m2    GFR est non-AA 31 (L) >60 ml/min/1.73m2    Calcium 9.4 8.3 - 10.4 MG/DL       Functional Assessment:          Balance  Sitting - Static: Normal  (05/02/18 1400)  Sitting - Dynamic: Normal (05/02/18 1400)  Standing - Static: Fair (05/02/18 1400)                         Condition on Admission: Good      Assessment:    The Post Assessment Physician Evaluation (RYAN) found the current functional status to be comparable with the Pre-admission Screening. The Patient is a good candidate for acute inpatient rehabilitation. Nothing since the Pre-admission screen has changed that determination. Rehabilitation Plan  The patient has shown the ability to tolerate and benefit from 3 hours of therapy daily and is being admitted to a comprehensive acute inpatient rehabilitation program consisting of at least 3 hours of combined physical and occupational therapies. Begin intensive Physical Therapy for a minimum of 1.5 hours a day, at least 5 out of 7 days per week to address bed mobility, transfers, ambulation, strengthening, balance, and endurance. Begin intensive Occupational Therapy for a minimum of 1.5 hours a day, at least 5 out of 7 days per week to address ADL ( bathing, LE dressing, toileting) and adaptive equipment as needed.     The patient will also require 24-hour skilled rehabilitation nursing for bowel and bladder management, skin care for decubitus ulcer prevention , pain management and ongoing medication administration     The patient may benefit from a psychology consult for depression, adjustment disorder. -ST eval for cognition    Continue daily physician medical management:  Pneumonia prophylaxis- Incentive spirometer every hour while awake    COPD/REJI; pt is on [facility] at [facility]. She manages this at home but having difficulty do to fxs in her hands  -cont Proventil , Pulmicort; Spiriva;  try weaning daytime O2; currently 4-6L due to hypoxia. Doesn't use during day at home per pt    Severe CHF/cardiomyopathy; compensated currently; cont Coreg, Lasix    DVT risk / DVT Prophylaxis- Will require daily physician exam to assess for signs and symptoms as patient is at increased risk for of thromboembolism. Mobilization as tolerated. Intermittent pneumatic compression devices when in bed Thigh-high or knee-high thromboembolic deterrent hose when out of bed. On therapeutic Coumadin     Pain Control: ongoing significant pain in back, shoulders, knees and hands which is stable and controlled by PRN meds. Will require regular pain assessment and comprenhensive pain management,   -has end stage OA of the knees and now with right knee sprain; ice/ace wrap, modalitie    Wound Care: Monitor wound status daily per staff and physician. At risk for failure. Will require 24/7 rehab nursing. None needed but at risk due to immobility     Hypertension - BP uncontrolled, fluctuating, managed medically. Has hypotension, asymptomatic, parameters with meds     MDS/chronic anemia; monitor , replace as needed, consider epogen    FELIZ on CKD; improving, monitor labs and adjust meds; daily wt due to CHF    Chronic coumadin therapy due to mech AVR; goal INR 2-3; inr 2.9; monitor and have pharmacy assist in dosing    Urinary retention/ neurogenic bladder - schedule voids q6-8 hrs. Check post-void residual as needed;  In and Out catheterize if post-void residual is more than 400 cc.    bowel program - constipation, added bowel program    GERD - resume PPI. At times may need additional antacids, Maalox prn. The patient's prognosis for significant practical improvement within a reasonable period of time appears good and the estimated length of stay is 10-14 days and he is expected to return home with family assistance and continued rehabilitation with home health therapy. Given the patient's complex neurologic/medical condition and the risk of further medical complications including: DVT, PE, skin breakdown, pneumonia due to decreased mobility,CVA, MI, cardiac arrhythmias due to HTN, increased risk of thromboembolism secondary to decreased blood volume and increased energy expenditure in a patient with known anemia could potentially impact the IRF program with decreased cardiovascular efficiency, postural hypotension and cardiac arrhythmia. For these ongoing medical issues, rehabilitation services could not be safely provided at a lower level of care such as a skilled nursing facility or nursing home.         Signed By: Danisha Pearce MD     May 2, 2018

## 2018-05-02 NOTE — PROGRESS NOTES
PHYSICAL THERAPY EXAMINATION  TIME IN  [address] [place] [address] Dr  TIME OUT [address]  Patient Name: Desire Li  Patient Age: 77 y.o. Past Medical History:   Past Medical History:   Diagnosis Date    Anticoagulated on Coumadin 7/9/2013    S/P AVR     Benign hypertension     controlled    CAD (coronary artery disease) 1/20/2013 5/8/14 PCI LAD with stent placed     Cardiomyopathy ([facility]     CHF (congestive heart failure) ([facility] 2/17/2017    Chronic left-sided low back pain without sciatica 6/15/2016    Chronic respiratory failure ([facility] 4/2/2014    CKD (chronic kidney disease) stage 3, GFR 30-59 ml/min 7/10/2013    [facility] 4/2/2014    Essential hypertension, benign 1/20/2013    HLD (hyperlipidemia)     ICD (implantable cardioverter-defibrillator) in place 10/2/2014    Biotronik single-chamber ICD implantation 10/20/14     Iron deficiency anemia due to chronic blood loss 7/29/2009    MDS (myelodysplastic syndrome) ([facility] 12/17/2011    Procrit started in August, 2011 12/18/11 Procrit weekly and Iron stores. 5-12 12-13-12 good response to 3 weekly procrit did not need it last time  3-7-13 Pt doing well. Just wanted a \"check-up. \" Responding to Procrit every three weeks.  8-29-13 patient has missed some injections on recently restarted hemoglobin only issue , takes oral iron iron stores each time       REJI (obstructive sleep apnea)-cpap 4/2/2014    Osteoarthritis     Osteopenia     Quadrantanopia     Secondary pulmonary hypertension     Tachycardia     Rapid H/B     Visual complaint 12/14/2015       Medical Diagnosis:  Debillity   Physical debility <principal problem not specified>    Precautions at Admission: Other (comment) (fall precautions, NWB Left hand)    Therapy Diagnosis:   Difficulty with bed mobility  [x]     Difficulty with functional transfers  [x]     Difficulty with ambulation  [x]     Difficulty with stair negotiations  [x]       Problem List: Decreased strength B LE  [x]     Decreased strength trunk/core  [x]     Decreased AROM   [x]     Decreased PROM  [x]    Decreased endurance  [x]     Decreased balance sitting  []     Decreased balance standing  [x]     Pain   [x]     Slow ambulation velocity  [x]    Decreased coordination  [x]    Decreased safety awareness  []      Functional Limitations:   Decreased independence with bed mobility  [x]     Decreased independence with functional transfers  [x]     Decreased independence with ambulation  [x]     Decreased independence with stair negotiation  [x]       Previous Functional Level: Pt was on 3 LPM of O2 during the day. Pt was living alone with CLTC coming 5 morning a week. Pt was sponge bathing daily. Pt would heat up meals. Pt no longer drives. Patient reporting she was ambulating independently without a device inside her home prior to fall.  Reports she required assistance when leaving home    Home Environment: Home Environment: Apartment  # Steps to Enter: 2  Rails to Enter: No  Hand Rails : Right  Wheelchair Ramp: No  One/Two Story Residence: One story  Living Alone: Yes  Support Systems: Child(alysha), Home care staff  Patient Expects to be Discharged to[de-identified] Apartment  Current DME Used/Available at Home: Shower chair, Commode, bedside, Wheelchair  Tub or Shower Type: Tub/Shower combination         Outcome Measures: Vital Signs:   Patient Vitals for the past 12 hrs:   Temp Pulse Resp BP SpO2   05/02/18 1511 98.7 °F (37.1 °C) 74 18 91/53 95 %   05/02/18 1100 97.2 °F (36.2 °C) 77 18 111/52 95 %     02 sat 94% and HR 77 at rest on 02 at 2 lpm. 02 sat 81% and HR 84 after ambulating 75 ft on 02 at 2 lpm. Increased 02 to 3 lpm and 02 sat returned to 91% after 2 minutes of rest    Pain level: 2 to 5 out of 10  Pain location:right knee and bilateral lateral aspect of both hands  Pain interventions:Pain medication per RN, rest, positioning,    Patient education:PT POC and goals, Bed mobility training,transfer training, gait training, fall precautions, activity pacing,body mechanics, energy conservation, breathing techniques during functional mobility. Patient verbalizing understanding and demonstrating partial understanding of patient education. Recommend follow up education. Interdisciplinary Communication:spoke with OT regarding LOS, functional status and goals, Spoke with RN regarding functional status       MMT Initial Asssessment   Right Lower Extremity Left Lower Extremity   Hip Flexion 4- 4   Knee Extension 4 5   Knee Flexion 4- 4+   Ankle Dorsiflexion 5 5   0/5 No palpable muscle contraction  1/5 Palpable muscle contraction, no joint movement  2-/5 Less than full range of motion in gravity eliminated position  2/5 Able to complete full range of motion in gravity eliminated position  2+/5 Able to initiate movement against gravity  3-/5 More than half but not full range of motion against gravity  3/5 Able to complete full range of motion against gravity  3+/5 Completes full range of motion against gravity with minimal resistance  4-/5 Completes full range of motion against gravity with minimal-moderate resistance  4/5 Completes full range of motion against gravity with moderate resistance  4+/5 Completes full range of motion against gravity with moderate-maximum resistance  5/5 Completes full range of motion against gravity with maximum resistance     AROM: LE generally decreased but functional.    FIM SCORES Initial Assessment   Bed/Chair/Wheelchair Transfers 3   Wheelchair Mobility 0   Walking [place] 2   Steps/Stairs 0   PRIMARY MODE OF LOCOMOTION: ambulation  Please see C Interdisciplinary Eval: Coordination/Balance Section for details regarding FIM score description.     BED/CHAIR/WHEELCHAIR TRANSFERS Initial Assessment   Rolling Right 4 (Minimal assistance)   Rolling Left 4 (Minimal assistance)   Supine to Sit 4 (Minimal assistance)   Sit to Stand Minimal assistance   Sit to Supine 4 (Minimal assistance)   Transfer Assist Score 4   Transfer Type SPT without device   Comments Increased time and effort with bed mobility and transfers secondary to limited use of UEs. Excessive trunk flexion during sit to stand   Car Transfer Not tested   Car Type         WHEELCHAIR MOBILITY/MANAGEMENT Initial Assessment   Able to Propel 0 feet (Unable to effectively propel with UEs.)   Functional Level 0   Curbs/ramps assistance required 0 (Not tested)   Wheelchair set up assistance required 0 (Not tested)   Wheelchair management         WALKING INDEPENDENCE Initial Assessment   Assistive device Gait belt   Ambulation assistance - level surface 4 (Contact guard assistance)   Distance 75 Feet (ft)   Functional Level 2   Comments slow cont step through gait with mild decrease in ankle DF in initial contact, mild one time ataxia with deviation to left with patient able to correct   Ambulation assistance - unlevel surface 0 (Not tested)       STEPS/STAIRS Initial Assessment   Steps/Stairs ambulated 0   Rail Use     Functional Level 0   Comments Unable to ambulate up/down steps at this time due to inability to use UEs on hand rails and limited flexion strength and decreased balance   Curbs/Ramps 0 (Not tested)     QUALITY INDICATOR ASSIST COMMENTS   Walk 10 feet CGA with gait belt    Walk 50 feet with 2 turns CGA with gait belt    Walk 150 feet Unable secondary to decreased endurance and decreased 02 sat    Walk 10 feet on uneven  Unable due to decreased balance    1 step/curb Up/down 3 inch step with min assist    4 steps Unable due to inability to use UEs for support on hand rails and due to LE weakness and impaired balance    12 steps Unable due to inability to use UEs for support on hand rails and due to LE weakness and impaired balance     object Unable to  object with hands at this time due to fractures    Wheel 50' w/2 turns Unable to propel wheelchair with hands due to fractures, w/c height to tall to utilize LEs for propulsion    Wheel 150' Unable to propel wheelchair with hands due to fractures, w/c height to tall to utilize LEs for propulsion             PHYSICAL THERAPY PLAN OF CARE    Therapy Diagnosis:   Please see table above    Order received from MD for physical therapy services and chart reviewed. Pt to be seen at least 5 times per week for at least 1.5 hours of physical therapy per day for 2 weeks. Thank you for the referral.    LTGs:  LTG 1. Patient roll left and right and transfer supine<>sit with modified independence demonstrating improved body mechanics in 10 days  LTG 2. Patient transfers sit<>stand and perform stand pivot transfer with modified independence demonstrating improved body mechanics in 10 days  LTG 3. Patient ambulating 100ft independently on level surfaces demonstrating ability to manage 02 line in 10 days  LTG 4. Patient ambulate 200 ft with SBA and assist to manage 02 tank demonstrating ability to keep 02 sat greater than 88% with improved breathing pattern in 10 days  LTG 5. Patient ambulate up/down 4 steps with min assist in 10 days    Pt would benefit from skilled physical therapy in order to improve independent functional mobility within the home. Interventions may include range of motion (AROM, PROM B LE/trunk), motor function (B LE/trunk strengthening/coordination), activity tolerance (vitals, oxygen saturation levels), bed mobility training, balance activities, gait training (progressive ambulation program), and functional transfer training. Please see IRC; Interdisciplinary Eval, Care Plan, and Patient Education for further information regarding physical therapy examination and plan of care.      Patient returned to room and remained sitting bedside with needs in reach, 02 at 3 lpm. CNA in to assist patient with eating    Gerrianne Kev, PT  5/2/2018

## 2018-05-02 NOTE — IP AVS SNAPSHOT
[address] 
[phone] Patient: Leora Beltran MRN: [id number] CLD:[birth year] A check suzanne indicates which time of day the medication should be taken. My Medications CHANGE how you take these medications Instructions Each Dose to Equal  
 Morning Noon Evening Bedtime  
 ferrous gluconate 324 mg (38 mg iron) tablet What changed:  additional instructions Your last dose was: Your next dose is: TAKE 1 TAB BY MOUTH DAILY (BEFORE BREAKFAST). INDICATIONS: IRON DEFICIENCY ANEMIA  
     
   
   
   
  
 loratadine 10 mg tablet Commonly known as:  Mae Canavan What changed:  See the new instructions. Your last dose was: Your next dose is: TAKE 1 TAB BY MOUTH DAILY. * oxyCODONE IR 10 mg Tab immediate release tablet Commonly known as:  Mamta Enriquez What changed:  Another medication with the same name was added. Make sure you understand how and when to take each. Your last dose was: Your next dose is: Take 1 Tab by mouth every four (4) hours as needed. Max Daily Amount: 60 mg.  
 10 mg  
    
   
   
   
  
 * oxyCODONE IR 5 mg immediate release tablet Commonly known as:  Mamta Enriquez What changed: You were already taking a medication with the same name, and this prescription was added. Make sure you understand how and when to take each. Your last dose was: Your next dose is: Take 1-2 Tabs by mouth every six (6) hours as needed. Max Daily Amount: 40 mg.  
 5-10 mg  
    
   
   
   
  
 * warfarin 5 mg tablet Commonly known as:  COUMADIN What changed:  Another medication with the same name was added. Make sure you understand how and when to take each. Your last dose was: Your next dose is: Take 1 Tab by mouth five (5) days a week. 5 mg * warfarin 2.5 mg tablet Commonly known as:  COUMADIN What changed:  Another medication with the same name was added. Make sure you understand how and when to take each. Your last dose was: Your next dose is: Take 1 Tab by mouth two (2) days a week. 2.5 mg  
    
   
   
   
  
 * warfarin 5 mg tablet Commonly known as:  COUMADIN What changed: You were already taking a medication with the same name, and this prescription was added. Make sure you understand how and when to take each. Your last dose was: Your next dose is: Take 1 Tab by mouth five (5) days a week. Indications: THROMBOEMBOLISM DUE TO PROSTHETIC HEART VALVES  
 5 mg * warfarin 2.5 mg tablet Commonly known as:  COUMADIN Start taking on:  5/19/2018 What changed: You were already taking a medication with the same name, and this prescription was added. Make sure you understand how and when to take each. Your last dose was: Your next dose is: Take 1 Tab by mouth two (2) days a week. 2.5 mg  
    
   
   
   
  
 * Notice: This list has 6 medication(s) that are the same as other medications prescribed for you. Read the directions carefully, and ask your doctor or other care provider to review them with you. CONTINUE taking these medications Instructions Each Dose to Equal  
 Morning Noon Evening Bedtime  
 acetaminophen 325 mg tablet Commonly known as:  TYLENOL Your last dose was: Your next dose is: Take 650 mg by mouth every four (4) hours as needed for Pain. 650 mg  
    
   
   
   
  
 * albuterol 2.5 mg /3 mL (0.083 %) nebulizer solution Commonly known as:  PROVENTIL VENTOLIN Your last dose was: Your next dose is:    
   
   
 3 mL by Nebulization route every four (4) hours as needed for Wheezing.   
 2.5 mg  
    
   
   
   
  
 * albuterol 90 mcg/actuation inhaler Commonly known as:  VENTOLIN HFA Your last dose was: Your next dose is:    
   
   
 2 puffs qid prn  
     
   
   
   
  
 carvedilol 3.125 mg tablet Commonly known as:  Thierry Klein Your last dose was: Your next dose is: Take 1 Tab by mouth two (2) times daily (with meals). 3.125 mg  
    
   
   
   
  
 cholecalciferol (VITAMIN D3) 5,000 unit Tab tablet Commonly known as:  VITAMIN D3 Your last dose was: Your next dose is: Take 1 Tab by mouth daily. 5000 Units  
    
   
   
   
  
 escitalopram oxalate 10 mg tablet Commonly known as:  Nathaly Bread Your last dose was: Your next dose is: TAKE 1 TAB BY MOUTH DAILY. INDICATIONS: ANXIETY WITH DEPRESSION  
     
   
   
   
  
 fluticasone 50 mcg/actuation nasal spray Commonly known as:  Francebhupinder Oliver Your last dose was: Your next dose is: 2 Sprays by Both Nostrils route daily as needed. 2 Spray  
    
   
   
   
  
 furosemide 40 mg tablet Commonly known as:  LASIX Your last dose was: Your next dose is: Take 2 Tabs by mouth two (2) times a day. 80 mg  
    
   
   
   
  
 isosorbide mononitrate ER 30 mg tablet Commonly known as:  IMDUR Your last dose was: Your next dose is: TAKE 1 TAB BY MOUTH EVERY MORNING. mometasone-formoterol 200-5 mcg/actuation HFA inhaler Commonly known as:  Reche Mars Your last dose was: Your next dose is:    
   
   
 2 puffs bid, rinse mouth after use. nitroglycerin 0.4 mg SL tablet Commonly known as:  NITROSTAT Your last dose was: Your next dose is: 0.4 mg by SubLINGual route every five (5) minutes as needed. 0.4 mg OXYGEN-AIR DELIVERY SYSTEMS Your last dose was: Your next dose is: 3 L by Nasal route continuous. 3 L  
    
   
   
   
  
 sacubitril-valsartan 24-26 mg tablet Commonly known as:  ENTRESTO Your last dose was: Your next dose is: Take 1 Tab by mouth two (2) times a day. INDICATIONS: CHRONIC HEART FAILURE  
 1 Tab  
    
   
   
   
  
 simvastatin 20 mg tablet Commonly known as:  ZOCOR Your last dose was: Your next dose is: TAKE 1 TABLET BY MOUTH EVERY DAY  
     
   
   
   
  
 traMADol 50 mg tablet Commonly known as:  ULTRAM  
   
Your last dose was: Your next dose is: TAKE 1 TABLET BY MOUTH EVERY 4 HOURS AS NEEDED  
     
   
   
   
  
 umeclidinium 62.5 mcg/actuation inhaler Commonly known as:  INCRUSE ELLIPTA Your last dose was: Your next dose is: Take 1 Puff by inhalation daily. Indications: j44.9  
 1 Puff * Notice: This list has 2 medication(s) that are the same as other medications prescribed for you. Read the directions carefully, and ask your doctor or other care provider to review them with you. STOP taking these medications ASPIR-81 81 mg tablet Generic drug:  aspirin delayed-release  
   
  
 bisacodyl 10 mg suppository Commonly known as:  DULCOLAX Where to Get Your Medications [facility] [address], [place] [address] Phone:  [phone]  
  carvedilol 3.125 mg tablet  
 warfarin 2.5 mg tablet  
 warfarin 5 mg tablet Information on where to get these meds will be given to you by the nurse or doctor. ! Ask your nurse or doctor about these medications  
  oxyCODONE IR 5 mg immediate release tablet

## 2018-05-02 NOTE — PROGRESS NOTES
Pt in bed with 2 L NC in place. Pt awaken when spoken to. Pt complaints of bilateral hand pain 9/10. Pt has tapped fingers on right hand for comfort and splint to left hand. Bruising around left eye, left eye bloody. Pt has bruising to left upper thigh and right knee. Pt refuses SCDS at this time. Pt reports large BM during the night and reports feeling much better. . Pt encouraged to call for assistance if needed call light in reach, door open will monitor.

## 2018-05-02 NOTE — DISCHARGE SUMMARY
Hospitalist Discharge Summary     Patient ID:  Kay Cruz  [id number]  77 y.o.  1950  Admit date: 4/27/2018  7:56 PM  Discharge date and time: 5/2/2018  Attending: Akash Godinez MD  PCP:  Gomez Bruno MD  Treatment Team: Attending Provider: Akash Godinez MD; Consulting Provider: Stephen Darby MD; Care Manager: Clover Cortes RN; Consulting Provider: Sarah Anderson MD    Principal Diagnosis Subdural hematoma [facility])   Principal Problem:    Subdural hematoma ([address] (4/27/2018)    Active Problems:    Chronic combined systolic and diastolic heart failure ([address] (1/20/2013)      Overview: 5/8/14 ECHO:  EF 10-15%      12/2017:  EF 25-30%      Essential hypertension, benign (1/20/2013)      Anticoagulated on Coumadin (7/9/2013)      Overview: S/P AVR      CKD (chronic kidney disease) stage 3, GFR 30-59 ml/min (7/10/2013)      COPD (chronic obstructive pulmonary disease) ([address] (4/2/2014)      REJI (obstructive sleep apnea)-cpap (4/2/2014)      HLD (hyperlipidemia) ()      S/P AVR (aortic valve replacement) ()      Overview: Mechanical/Artific      Cardiomyopathy ([address] ()      Pulmonary hypertension ([address] (6/15/2016)      Type 2 diabetes mellitus with nephropathy ([address] (12/18/2017)      Long term (current) use of anticoagulants (4/19/2018)      Closed nondisplaced fracture of shaft of fifth metacarpal bone of left hand (4/28/2018)             Hospital Course:  Please refer to the admission H&P for details of presentation. In summary, the patient is 65yo AAF with mechanical aortic valve for which she takes coumadin, presented 4/26 after fall at home, missed a step. Found to have left and right hand fractures. At first thought to have possible small subdural hematoma and coumadin was held. Repeat imaging actually shows small SDH was actually artifact. neurosx signed off and coumadin restarted. Ortho is following for hand fractures.  She follows Dr Junaa Jarrett for symptomatic anemia and receives monthly procrit. Was transfused one unit on 4/29.        Significant Diagnostic Studies:   Final result (Exam End: 4/30/2018  9:39 AM) Open        Study Result      AP, lateral view left knee 4/30/2018     INDICATION: Pain after fall     FINDINGS: No fracture, dislocation, or discrete soft tissue swelling. No joint  effusion. No significant degenerative change.     IMPRESSION  IMPRESSION: No acute abnormality. Final result (Exam End: 4/30/2018  9:39 AM) Open        Study Result      AP, lateral view right knee 4/30/2018     INDICATION: Pain after fall     COMPARISON: 4/27/2018     FINDINGS: No fracture, dislocation, joint effusion, or significant degenerative  change.     IMPRESSION  IMPRESSION: No acute abnormality. Final result (Exam End: 4/28/2018  2:27 AM) Open        Study Result      CT HEAD WITHOUT CONTRAST      HISTORY:  Subdural hematoma.     COMPARISON: 4/27/2018     TECHNIQUE: Axial imaging was performed without intravenous contrast utilizing  5mm slice thickness. Sagittal and coronal reformats were performed. Radiation  dose reduction techniques were used for this study. Our CT scanner uses one or  all of the following:     Automated exposure control, adjustment of the MAS or KUB according to patient's  size and iterative reconstruction.     FINDINGS:         *BRAIN:      -  There are no early signs of territorial or lacunar infarction by CT.     -  Right posterior parietal presumed subdural hematoma on the prior study has  resolved and likely represented an artifact. Low-density changes in the  bilateral parieto-occipital lobes unchanged. -  No gross white matter abnormality by CT.     *VISUALIZED PARANASAL SINUSES: Well aerated. *MASTOIDS:  Clear. *CALVARIUM AND SCALP: Unremarkable.        IMPRESSION  IMPRESSION:     Apparent subdural hematoma seen on the prior study likely represented artifact.   Low-density changes in the bilateral parieto-occipital lobes unchanged.     Date of Dictation: 4/28/2018 2:32 AM  .     Final result (Exam End: 4/27/2018  9:01 PM) Open        Study Result      EXAM:  XR HAND LT MIN 3 V     INDICATION:  fall, hand pain     COMPARISON: None.     FINDINGS: The  views of the left hand demonstrate an avulsion fracture of the  base of the third proximal phalanx. There is an oblique fracture of the distal  fifth metacarpal metaphysis. The soft tissues are swollen.      IMPRESSION  IMPRESSION: Oblique fracture the distal fifth metacarpal metaphysis.     Avulsion fracture the base of the third proximal phalanx.           Final result (Exam End: 4/27/2018  9:01 PM) Open        Study Result      EXAM:  XR HAND RT MIN 3 V     INDICATION:  fall, swelling     COMPARISON: None.     FINDINGS: 3  views of the right hand demonstrate no fractures. There is dorsal  soft tissue swelling. .        IMPRESSION  IMPRESSION: Dorsal soft tissue swelling without evidence of a fracture.        Final result (Exam End: 4/27/2018  9:01 PM) Open        Study Result      EXAM:  XR KNEE RT 3 V     INDICATION:   R knee pain     COMPARISON: None.     FINDINGS: Three views of the right knee demonstrate no fracture, effusion or  other osseous, articular or soft tissue abnormality.       IMPRESSION  IMPRESSION:  Normal right knee.            Labs: Results:       Chemistry Recent Labs      05/02/18   0750  05/01/18   0645  04/30/18   0636   GLU  123*  114*  121*   NA  143  141  140   K  4.9  4.7  4.6   CL  100  97*  97*   CO2  37*  38*  38*   BUN  56*  55*  49*   CREA  1.71*  2.17*  2.32*   CA  9.4  8.4  8.6   AGAP  6*  6*  5*      CBC w/Diff Recent Labs      05/02/18   0750  05/01/18   0645  04/30/18   0636   WBC  8.4  6.9  7.3   RBC  2.97*  2.70*  2.79*   HGB  8.5*  7.9*  8.0*   HCT  27.2*  25.1*  26.1*   PLT  232  189  185   GRANS   --    --   65   LYMPH   --    --   25   EOS   --    --   3      Cardiac Enzymes No results for input(s): CPK, CKND1, MAY in the last 72 hours.     No lab exists for component: CKRMB, TROIP   Coagulation Recent Labs      05/02/18   0750  05/01/18   0645   PTP  30.3*  30.0*   INR  2.9  2.9       Lipid Panel Lab Results   Component Value Date/Time    Cholesterol, total 135 03/27/2018 09:16 AM    HDL Cholesterol 68 03/27/2018 09:16 AM    LDL, calculated 55 03/27/2018 09:16 AM    VLDL, calculated 12 03/27/2018 09:16 AM    Triglyceride 62 03/27/2018 09:16 AM    CHOL/HDL Ratio 2.7 02/16/2017 05:15 AM      BNP No results for input(s): BNPP in the last 72 hours. Liver Enzymes No results for input(s): TP, ALB, TBIL, AP, SGOT, GPT in the last 72 hours. No lab exists for component: DBIL   Thyroid Studies Lab Results   Component Value Date/Time    TSH 3.060 11/28/2017 10:48 AM            Discharge Exam:  Visit Vitals    /76    Pulse 76    Temp 98.2 °F (36.8 °C)    Resp 18    Ht 5' 3\" (1.6 m)    Wt 100.4 kg (221 lb 4.8 oz)    SpO2 95%    Breastfeeding No    BMI 39.2 kg/m2     General appearance: alert, cooperative, no distress, appears stated age  Lungs: clear to auscultation bilaterally  Heart: regular rate and rhythm, S1, S2 normal, no murmur, click, rub or gallop  Abdomen: soft, non-tender. Bowel sounds normal. No masses,  no organomegaly  Extremities: left hand in splint, right hand 4th/5th digits buddy taped  Neurologic: Grossly normal    Disposition:home  Discharge Condition: stable  Patient Instructions:   Current Discharge Medication List      START taking these medications    Details   bisacodyl (DULCOLAX) 10 mg suppository Insert 10 mg into rectum daily as needed. Qty: 1 Each, Refills: 0      oxyCODONE IR (ROXICODONE) 10 mg tab immediate release tablet Take 1 Tab by mouth every four (4) hours as needed.  Max Daily Amount: 60 mg.  Qty: 1 Tab, Refills: 0    Associated Diagnoses: Closed nondisplaced fracture of shaft of fifth metacarpal bone of left hand, initial encounter         CONTINUE these medications which have CHANGED    Details   !! warfarin (COUMADIN) 5 mg tablet Take 1 Tab by mouth five (5) days a week. Qty: 1 Tab, Refills: 0      !! warfarin (COUMADIN) 2.5 mg tablet Take 1 Tab by mouth two (2) days a week. Qty: 1 Tab, Refills: 0      carvedilol (COREG) 3.125 mg tablet Take 1 Tab by mouth two (2) times daily (with meals). Qty: 1 Tab, Refills: 0       !! - Potential duplicate medications found. Please discuss with provider. CONTINUE these medications which have NOT CHANGED    Details   simvastatin (ZOCOR) 20 mg tablet TAKE 1 TABLET BY MOUTH EVERY DAY  Qty: 90 Tab, Refills: 0    Comments: FAXED  Associated Diagnoses: Mixed hyperlipidemia      escitalopram oxalate (LEXAPRO) 10 mg tablet TAKE 1 TAB BY MOUTH DAILY. INDICATIONS: ANXIETY WITH DEPRESSION  Qty: 90 Tab, Refills: 0    Associated Diagnoses: Dysthymia      traMADol (ULTRAM) 50 mg tablet TAKE 1 TABLET BY MOUTH EVERY 4 HOURS AS NEEDED  Qty: 180 Tab, Refills: 1    Comments: Not to exceed 4 additional fills before 03/07/2018  Associated Diagnoses: Osteoarthritis of shoulder, unspecified laterality, unspecified osteoarthritis type      isosorbide mononitrate ER (IMDUR) 30 mg tablet TAKE 1 TAB BY MOUTH EVERY MORNING. Qty: 90 Tab, Refills: 3    Associated Diagnoses: Chronic combined systolic and diastolic heart failure (HCC)      furosemide (LASIX) 40 mg tablet Take 2 Tabs by mouth two (2) times a day. Qty: 120 Tab, Refills: 6    Associated Diagnoses: Essential hypertension, benign      ferrous gluconate 324 mg (38 mg iron) tablet TAKE 1 TAB BY MOUTH DAILY (BEFORE BREAKFAST). INDICATIONS: IRON DEFICIENCY ANEMIA  Qty: 90 Tab, Refills: 3    Associated Diagnoses: Iron deficiency anemia due to chronic blood loss      fluticasone (FLONASE) 50 mcg/actuation nasal spray 2 Sprays by Both Nostrils route daily as needed. Qty: 3 Bottle, Refills: 3      umeclidinium (INCRUSE ELLIPTA) 62.5 mcg/actuation inhaler Take 1 Puff by inhalation daily.  Indications: j44.9  Qty: 1 Inhaler, Refills: 11 sacubitril-valsartan (ENTRESTO) 24 mg/26 mg tablet Take 1 Tab by mouth two (2) times a day. INDICATIONS: CHRONIC HEART FAILURE  Qty: 60 Tab, Refills: 6      loratadine (CLARITIN) 10 mg tablet TAKE 1 TAB BY MOUTH DAILY. Qty: 30 Tab, Refills: 1    Associated Diagnoses: Upper respiratory tract infection, unspecified type      mometasone-formoterol (DULERA) 200-5 mcg/actuation HFA inhaler 2 puffs bid, rinse mouth after use. Qty: 1 Inhaler, Refills: 11    Associated Diagnoses: Pulmonary hypertension ([address] Chronic respiratory failure with hypoxia (HCC)      albuterol (PROVENTIL VENTOLIN) 2.5 mg /3 mL (0.083 %) nebulizer solution 3 mL by Nebulization route every four (4) hours as needed for Wheezing. Qty: 120 Each, Refills: 11    Associated Diagnoses: Pulmonary hypertension ([address] Chronic respiratory failure with hypoxia (HCC)      albuterol (VENTOLIN HFA) 90 mcg/actuation inhaler 2 puffs qid prn  Qty: 1 Inhaler, Refills: 11    Associated Diagnoses: Pulmonary hypertension ([address] Chronic respiratory failure with hypoxia (HCC)      cholecalciferol, VITAMIN D3, (VITAMIN D3) 5,000 unit tab tablet Take 1 Tab by mouth daily. Qty: 90 Tab, Refills: 4      acetaminophen (TYLENOL) 325 mg tablet Take 650 mg by mouth every four (4) hours as needed for Pain. OXYGEN-AIR DELIVERY SYSTEMS 3 L by Nasal route continuous. nitroglycerin (NITROSTAT) 0.4 mg SL tablet 0.4 mg by SubLINGual route every five (5) minutes as needed. aspirin delayed-release (ASPIR-81) 81 mg tablet Take 81 mg by mouth every morning.              Activity:no lifting with either hand  Diet: cardiac}    Follow-up  ·  PCP in 1-2 weeks, ortho in 2-3 weeks  Time spent to discharge patient 35 minutes  Signed:  Candelario Fields DO  5/2/2018  9:28 AM

## 2018-05-03 LAB
ALBUMIN SERPL-MCNC: 3.2 G/DL (ref 3.2–4.6)
ALBUMIN/GLOB SERPL: 0.8 {RATIO} (ref 1.2–3.5)
ALP SERPL-CCNC: 51 U/L (ref 50–136)
ALT SERPL-CCNC: 19 U/L (ref 12–65)
ANION GAP SERPL CALC-SCNC: 6 MMOL/L (ref 7–16)
AST SERPL-CCNC: 16 U/L (ref 15–37)
BASOPHILS # BLD: 0 K/UL (ref 0–0.2)
BASOPHILS NFR BLD: 0 % (ref 0–2)
BILIRUB SERPL-MCNC: 0.6 MG/DL (ref 0.2–1.1)
BUN SERPL-MCNC: 54 MG/DL (ref 8–23)
CALCIUM SERPL-MCNC: 9.6 MG/DL (ref 8.3–10.4)
CHLORIDE SERPL-SCNC: 100 MMOL/L (ref 98–107)
CO2 SERPL-SCNC: 35 MMOL/L (ref 21–32)
CREAT SERPL-MCNC: 1.53 MG/DL (ref 0.6–1)
DIFFERENTIAL METHOD BLD: ABNORMAL
EOSINOPHIL # BLD: 0.2 K/UL (ref 0–0.8)
EOSINOPHIL NFR BLD: 3 % (ref 0.5–7.8)
ERYTHROCYTE [DISTWIDTH] IN BLOOD BY AUTOMATED COUNT: 14.2 % (ref 11.9–14.6)
GLOBULIN SER CALC-MCNC: 3.8 G/DL (ref 2.3–3.5)
GLUCOSE SERPL-MCNC: 125 MG/DL (ref 65–100)
HCT VFR BLD AUTO: 26.4 % (ref 35.8–46.3)
HGB BLD-MCNC: 8.4 G/DL (ref 11.7–15.4)
IMM GRANULOCYTES # BLD: 0 K/UL (ref 0–0.5)
IMM GRANULOCYTES NFR BLD AUTO: 0 % (ref 0–5)
INR PPP: 2.7
LYMPHOCYTES # BLD: 2.1 K/UL (ref 0.5–4.6)
LYMPHOCYTES NFR BLD: 26 % (ref 13–44)
MCH RBC QN AUTO: 29.4 PG (ref 26.1–32.9)
MCHC RBC AUTO-ENTMCNC: 31.8 G/DL (ref 31.4–35)
MCV RBC AUTO: 92.3 FL (ref 79.6–97.8)
MONOCYTES # BLD: 0.7 K/UL (ref 0.1–1.3)
MONOCYTES NFR BLD: 9 % (ref 4–12)
NEUTS SEG # BLD: 4.8 K/UL (ref 1.7–8.2)
NEUTS SEG NFR BLD: 62 % (ref 43–78)
PLATELET # BLD AUTO: 230 K/UL (ref 150–450)
PMV BLD AUTO: 10.3 FL (ref 10.8–14.1)
POTASSIUM SERPL-SCNC: 4.5 MMOL/L (ref 3.5–5.1)
PROT SERPL-MCNC: 7 G/DL (ref 6.3–8.2)
PROTHROMBIN TIME: 28.3 SEC (ref 11.5–14.5)
RBC # BLD AUTO: 2.86 M/UL (ref 4.05–5.25)
SODIUM SERPL-SCNC: 141 MMOL/L (ref 136–145)
WBC # BLD AUTO: 7.9 K/UL (ref 4.3–11.1)

## 2018-05-03 PROCEDURE — 85025 COMPLETE CBC W/AUTO DIFF WBC: CPT | Performed by: PHYSICAL MEDICINE & REHABILITATION

## 2018-05-03 PROCEDURE — 97530 THERAPEUTIC ACTIVITIES: CPT

## 2018-05-03 PROCEDURE — 65310000000 HC RM PRIVATE REHAB

## 2018-05-03 PROCEDURE — 94640 AIRWAY INHALATION TREATMENT: CPT

## 2018-05-03 PROCEDURE — 97116 GAIT TRAINING THERAPY: CPT

## 2018-05-03 PROCEDURE — 99232 SBSQ HOSP IP/OBS MODERATE 35: CPT | Performed by: PHYSICAL MEDICINE & REHABILITATION

## 2018-05-03 PROCEDURE — 94760 N-INVAS EAR/PLS OXIMETRY 1: CPT

## 2018-05-03 PROCEDURE — 85610 PROTHROMBIN TIME: CPT | Performed by: PHYSICAL MEDICINE & REHABILITATION

## 2018-05-03 PROCEDURE — 97110 THERAPEUTIC EXERCISES: CPT

## 2018-05-03 PROCEDURE — 74011000250 HC RX REV CODE- 250: Performed by: PHYSICAL MEDICINE & REHABILITATION

## 2018-05-03 PROCEDURE — 92523 SPEECH SOUND LANG COMPREHEN: CPT

## 2018-05-03 PROCEDURE — 80053 COMPREHEN METABOLIC PANEL: CPT | Performed by: PHYSICAL MEDICINE & REHABILITATION

## 2018-05-03 PROCEDURE — 36415 COLL VENOUS BLD VENIPUNCTURE: CPT | Performed by: PHYSICAL MEDICINE & REHABILITATION

## 2018-05-03 PROCEDURE — 74011250637 HC RX REV CODE- 250/637: Performed by: PHYSICAL MEDICINE & REHABILITATION

## 2018-05-03 PROCEDURE — 97535 SELF CARE MNGMENT TRAINING: CPT

## 2018-05-03 RX ORDER — LANOLIN ALCOHOL/MO/W.PET/CERES
1 CREAM (GRAM) TOPICAL
Status: DISCONTINUED | OUTPATIENT
Start: 2018-05-03 | End: 2018-05-04

## 2018-05-03 RX ADMIN — SIMVASTATIN 20 MG: 20 TABLET, FILM COATED ORAL at 21:56

## 2018-05-03 RX ADMIN — SACUBITRIL AND VALSARTAN 1 TABLET: 24; 26 TABLET, FILM COATED ORAL at 17:21

## 2018-05-03 RX ADMIN — ALBUTEROL SULFATE 2.5 MG: 2.5 SOLUTION RESPIRATORY (INHALATION) at 15:03

## 2018-05-03 RX ADMIN — FUROSEMIDE 80 MG: 40 TABLET ORAL at 17:21

## 2018-05-03 RX ADMIN — CARVEDILOL 3.12 MG: 3.12 TABLET, FILM COATED ORAL at 16:35

## 2018-05-03 RX ADMIN — ALBUTEROL SULFATE 2.5 MG: 2.5 SOLUTION RESPIRATORY (INHALATION) at 18:27

## 2018-05-03 RX ADMIN — FERROUS SULFATE TAB 325 MG (65 MG ELEMENTAL FE) 325 MG: 325 (65 FE) TAB at 09:57

## 2018-05-03 RX ADMIN — Medication 10 ML: at 14:35

## 2018-05-03 RX ADMIN — POLYETHYLENE GLYCOL (3350) 17 G: 17 POWDER, FOR SOLUTION ORAL at 08:10

## 2018-05-03 RX ADMIN — OXYCODONE HYDROCHLORIDE 10 MG: 5 TABLET ORAL at 10:08

## 2018-05-03 RX ADMIN — ACETAMINOPHEN 650 MG: 325 TABLET ORAL at 17:25

## 2018-05-03 RX ADMIN — ALBUTEROL SULFATE 2.5 MG: 2.5 SOLUTION RESPIRATORY (INHALATION) at 06:00

## 2018-05-03 RX ADMIN — WARFARIN SODIUM 5 MG: 2.5 TABLET ORAL at 17:21

## 2018-05-03 RX ADMIN — TRAMADOL HYDROCHLORIDE 50 MG: 50 TABLET, FILM COATED ORAL at 14:07

## 2018-05-03 RX ADMIN — BUDESONIDE 500 MCG: 0.5 INHALANT RESPIRATORY (INHALATION) at 18:28

## 2018-05-03 RX ADMIN — SACUBITRIL AND VALSARTAN 1 TABLET: 24; 26 TABLET, FILM COATED ORAL at 08:10

## 2018-05-03 RX ADMIN — TIOTROPIUM BROMIDE 18 MCG: 18 CAPSULE ORAL; RESPIRATORY (INHALATION) at 15:03

## 2018-05-03 RX ADMIN — OXYCODONE HYDROCHLORIDE 10 MG: 5 TABLET ORAL at 22:00

## 2018-05-03 RX ADMIN — CARVEDILOL 3.12 MG: 3.12 TABLET, FILM COATED ORAL at 08:10

## 2018-05-03 RX ADMIN — SENNOSIDES 8.6 MG: 8.6 TABLET, FILM COATED ORAL at 08:10

## 2018-05-03 RX ADMIN — FUROSEMIDE 80 MG: 40 TABLET ORAL at 08:10

## 2018-05-03 RX ADMIN — ESCITALOPRAM 10 MG: 10 TABLET, FILM COATED ORAL at 08:10

## 2018-05-03 RX ADMIN — BUDESONIDE 500 MCG: 0.5 INHALANT RESPIRATORY (INHALATION) at 06:00

## 2018-05-03 NOTE — PROGRESS NOTES
Problem: Falls - Risk of  Goal: *Absence of Falls  Document Tia Fall Risk and appropriate interventions in the flowsheet.    Outcome: Progressing Towards Goal  Fall Risk Interventions:  Mobility Interventions: Patient to call before getting OOB, Utilize walker, cane, or other assitive device         Medication Interventions: Patient to call before getting OOB    Elimination Interventions: Call light in reach, Patient to call for help with toileting needs, Toileting schedule/hourly rounds    History of Falls Interventions: Door open when patient unattended

## 2018-05-03 NOTE — PROGRESS NOTES
05/03/18 1413   Time Spent With Patient   Time In 1345   Time Out 1411   Mental Status   Neurologic State Alert   Orientation Level Disoriented to time;Oriented to person;Oriented to place;Oriented to situation   Cognition Appropriate decision making   Perception Appears intact  (Poor vision)   Perseveration No perseveration noted   Safety/Judgement Fall prevention   Psychosocial   Patient Behaviors Calm   Reading Comprehension   Pre-Morbid Reading Status Unknown/unable to assess   Written Expression   Pre-Morbid Dominant Hand Unknown/unable to assess      05/03/18 1414   Verbal Expression   Primary Mode of Expression Verbal   Initiation No impairment   Automatic Speech Task No impairment   Repetition No impairment   Naming No impairment   Sentence Formulation No impairment   Conversation No impairment   Overall Impairment None   FIM Score (Verbal Expression) 7   Oral-Motor Structure/Motor Speech   Face No impairment   Labial No impairment   Oral Hygiene adequate   Lingual No impairment   Apraxic Characteristics None   Dysarthric Characteristics None   Intelligibility No impairment   Overall Impairment Severity None   Neuro-Linguistics/Cognitive Function   Reasoning  No impairment   Organizational No impairment   Problem Solving No impairment   FIM Score (Problem Solving) 7   Mathematics No impairment   Memory  No impairment   FIM Score (Memory) 7   Attention  No impairment   Overall Impairment Severity None   Pragmatics   Pragmatics Impairment No impairment   Pragmatics Impairment Severity None   FIM Score (Pragmatics/Social Interaction) 7   Pt completed basic cognitive-linguistic evaluation with performance as follows: simple yes/no questions 5/5, complex yes/no questions 5/5, 1-2 step commands 10/10, 3 step commands 5/5, named 10 items per minute in food & animal categories, problem solving questions 5/5, reasoning questions 5/5 and recalled 3/3 items in short term memory task.  Pt's family does her fiances and medications. Pt requesting chopped meats secondary to both her wrist broke and in wraps. Pt appears to be at her cognitive baseline. No further ST indicated.     Ramirez Ashton MA/HOSSEIN/SLP

## 2018-05-03 NOTE — PROGRESS NOTES
PHYSICAL THERAPY DAILY NOTE  Time In: 5052  Time Out: 9228  Patient Seen For: AM;Gait training;Patient education; Therapeutic exercise;Transfer training; Other (see progress notes)    Subjective: patient reporting she feels OK. Reports she slept OK last night. Reports less pain in right knee today. Reports she is on 02 24 hours a day at home         Objective:Vital Signs:02 at 3 lpm  Patient Vitals for the past 12 hrs:   Temp Pulse Resp BP SpO2   05/03/18 0746 98 °F (36.7 °C) 74 18 119/62 98 %   05/03/18 0548 - - - - 96 %     02 sat 89 to 92% after walking 100ft    Pain level: did not rate on pain scale. Visual pain level 0 to 3 out of 10  Pain location:lateral aspect of both hands and right knee  Pain interventions:Pain medication per RN, rest, positioning    Patient education:Breathing pattern training,transfer training, gait training, fall precautions, activity pacing,energy conservation, body mechanics,Patient verbalizing understanding and demonstrating partial understanding of patient education. Recommend follow up education.       Interdisciplinary Communication:spoke with OT regarding functional status and LOS    Other (comment) (fall precautions, NWB Left hand)  GROSS ASSESSMENT Daily Assessment     NA       BED/MAT MOBILITY Daily Assessment    Rolling Right : 0 (Not tested)  Rolling Left : 0 (Not tested)  Supine to Sit : 0 (Not tested)  Sit to Supine : 0 (Not tested)       TRANSFERS Daily Assessment   Increased time and effort to complete with cues for body mechanics   Transfer Type: SPT without device  Transfer Assistance : 4 (Contact guard assistance)  Sit to Stand Assistance: Minimal assistance  Car Transfers: Not tested       GAIT Daily Assessment    Amount of Assistance: 4 (Contact guard assistance)  Distance (ft): 100 Feet (ft) (100ft x 4 with 4 to 5 minute rest breaks between)  Assistive Device: Gait belt   Gait training with cues for breathing pattern controlling gait speed along with breathing pattern. No ataxia and no loss of balance    STEPS or STAIRS Daily Assessment    Steps/Stairs Ambulated (#): 0  Level of Assist : 0 (Not tested)       BALANCE Daily Assessment   Static standing balance with SBA Sitting - Static: Good (unsupported)  Sitting - Dynamic: Good (unsupported)  Standing - Static: Fair  Standing - Dynamic : Impaired       WHEELCHAIR MOBILITY Daily Assessment    Curbs/Ramps Assist Required (FIM Score): 0 (Not tested)  Wheelchair Setup Assist Required : 0 (Not tested)       LOWER EXTREMITY EXERCISES Daily Assessment   02 sat 92% Extremity: Both  Exercise Type #1: Other (comment) (LE motomed x 10 mins at level 1)  Level of Assist: Supervision          Assessment: Progressing towards goals. Imporved 02 sat during mobility with 02 at 3 lpm. Requires cues for improved breathing pattern during functional mobility       Rehab tech assisted patient back to room at end of treatment    Plan of Care: Continue with POC and progress as tolerated.      Dandre Green, PT  5/3/2018

## 2018-05-03 NOTE — PROGRESS NOTES
Care Management Interventions  PCP Verified by CM: Yes  Transition of Care Consult (CM Consult): Discharge Planning  Physical Therapy Consult: Yes  Occupational Therapy Consult: Yes  Speech Therapy Consult: Yes  Current Support Network: Own Home, Family Lives Nearby, Lives Alone  Plan discussed with Pt/Family/Caregiver: Yes  Freedom of Choice Offered: Yes  Discharge Location  Discharge Placement: Home with family assistance Tiara Martinez probably go to one of daughter's homes at discharge)     Pt [facility] -debility. Pt lives at home alone in a one level apartment with 2STE in the front or none in the back. She is usually independent with ADLS and ambulation. She has Medicare and Medicaid. States she has no problem getting meds. Has an aide 20 hrs per week through [address] Is hoping to get another aide as this one will not drive her to appointments. Pt has a Trilogy and 02 at home provided by [facility].  She has a medical alert and receives frozen meals through [address] Pt states she will probably stay with one of her daughters at discharge and have aide come to her daughter's house. Her main concern about returning home is transportation to appointments. States she can not ride Medicaid bus due to 02 limitations. SW to contact CM to discuss extending number of aide hours and to discuss changing to an aide who can provide transportation. Team conference; tentative discharge scheduled for 5/16. Discussed with pt; agreeable.

## 2018-05-03 NOTE — PROGRESS NOTES
End Of Shift Functional Summary, Nursing      TOILETING:    Does patient need assist with adjusting pants up or down and/or pericare? yes  If yes, please indicate what the patient needs help with:  Pants up and down and madi care  (i.e. pants up, pants down, pericare)  Pt uses walker. Does the patient require extra time? yes  Does the patient require standby assistance? no  Does the patient require contact guard assistance? yes  Does the patient require more than one staff member for assistance? no    TOILET TRANSFER:    Pt requires moderate assistance. Pt uses walker. Does the patient require extra time? yes  Does the patient require contact guard assistance? yes  Does the patient require more than one staff member for assistance? no    BLADDER:    Pt does not have a medina catheter that staff manages. Pt does not take medication. If so, please indicate which medication:    Pt is continent. of bladder and voids in bedside commode  Pt requires staff to empty device Pt has had 0 bladder accidents during this shift .  (An accident is when the episode is not contained in a brief AND/OR the clothing/linen requires changing/cleaning up.)      Documentation reviewed and plan of care discussed/reviewed with   patient and oncoming nurse during the shift.

## 2018-05-03 NOTE — PROGRESS NOTES
End Of Shift Functional Summary, Nursing      TOILETING:    Does patient need assist with adjusting pants up or down and/or pericare? yes  If yes, please indicate what the patient needs help with:  Pulling clothing up and wiping. Pt uses holding patients hand. Does the patient require extra time? yes  Does the patient require standby assistance? no  Does the patient require contact guard assistance? no  Does the patient require more than one staff member for assistance? no    TOILET TRANSFER:    Pt requires moderate assistance. Pt uses holding patients are. Does the patient require extra time? yes  Does the patient require contact guard assistance? no  Does the patient require more than one staff member for assistance? no    BLADDER:    Pt does not have a medina catheter that staff manages. Pt does not take medication. If so, please indicate which medication: None   Pt is continent. of bladder and voids in toilet  Pt requires staff to empty device Pt has had 0 bladder accidents during this shift requiring moderate assistance to clean up. (An accident is when the episode is not contained in a brief AND/OR the clothing/linen requires changing/cleaning up.)    BOWEL:  Pt does take medication. Pt is continent of bowel and uses toilet. Pt requires staff to empty device    Pt has had 0 bowel accidents during this shift requiring moderate assistance from staff to clean up. (An accident is when the episode is not contained in a brief AND/OR the clothing/linen requires changing/cleaning up.)    BED/CHAIR TRANSFER  Pt requires moderate assistance. Pt uses holding patients arm. Does the patient require extra time? yes  Does the patient require contact guard assistance? no  Does the patient require more than one staff member for assistance? no    EATING  Pt requires staff to feed. Pt does not wear dentures.       Documentation reviewed and plan of care discussed/reviewed with   oncoming nurse and patient assistant during the shift.

## 2018-05-03 NOTE — PROGRESS NOTES
Problem: Falls - Risk of  Goal: *Absence of Falls  Document Tia Fall Risk and appropriate interventions in the flowsheet.    Outcome: Progressing Towards Goal  Fall Risk Interventions:  Mobility Interventions: Communicate number of staff needed for ambulation/transfer, Bed/chair exit alarm, Patient to call before getting OOB, Utilize gait belt for transfers/ambulation, Strengthening exercises (ROM-active/passive)         Medication Interventions: Teach patient to arise slowly, Utilize gait belt for transfers/ambulation, Evaluate medications/consider consulting pharmacy    Elimination Interventions: Call light in reach, Elevated toilet seat, Toilet paper/wipes in reach, Patient to call for help with toileting needs    History of Falls Interventions: Door open when patient unattended, Evaluate medications/consider consulting pharmacy, Room close to nurse's station, Utilize gait belt for transfer/ambulation

## 2018-05-03 NOTE — PROGRESS NOTES
Holden Abebe MD,   Medical Director  [address] [facility], [address] [facility]  Tel: [phone]       [facility] PROGRESS NOTE    Cherylene Founds  Admit Date: 5/2/2018  Admit Diagnosis: Debillity ; Physical debility    Subjective     Doing pretty well. No cp, sob, n/v. Slept fine on Trilogy. Did well in therapy yesterday.  Still with significant pain in her hands and fear of using them in functional activities    Objective:     Current Facility-Administered Medications   Medication Dose Route Frequency    0.9% sodium chloride infusion 250 mL  250 mL IntraVENous PRN    0.9% sodium chloride infusion 250 mL  250 mL IntraVENous PRN    acetaminophen (TYLENOL) tablet 650 mg  650 mg Oral Q4H PRN    bisacodyl (DULCOLAX) tablet 5 mg  5 mg Oral DAILY PRN    diphenhydrAMINE (BENADRYL) capsule 25 mg  25 mg Oral Q4H PRN    LORazepam (ATIVAN) tablet 1 mg  1 mg Oral BID PRN    naloxone (NARCAN) injection 0.4 mg  0.4 mg IntraVENous PRN    prochlorperazine (COMPAZINE) injection 5 mg  5 mg IntraVENous Q8H PRN    sodium chloride (NS) flush 5-10 mL  5-10 mL IntraVENous Q8H    sodium chloride (NS) flush 5-10 mL  5-10 mL IntraVENous PRN    albuterol (PROVENTIL VENTOLIN) nebulizer solution 2.5 mg  2.5 mg Nebulization Q4H PRN    bisacodyl (DULCOLAX) suppository 10 mg  10 mg Rectal DAILY PRN    budesonide (PULMICORT) 500 mcg/2 ml nebulizer suspension  500 mcg Nebulization BID RT    And    albuterol (PROVENTIL VENTOLIN) nebulizer solution 2.5 mg  2.5 mg Nebulization Q6HWA RT    carvedilol (COREG) tablet 3.125 mg  3.125 mg Oral BID WITH MEALS    escitalopram oxalate (LEXAPRO) tablet 10 mg  10 mg Oral DAILY    furosemide (LASIX) tablet 80 mg  80 mg Oral BID    loratadine (CLARITIN) tablet 10 mg  10 mg Oral DAILY PRN    oxyCODONE IR (ROXICODONE) tablet 10 mg  10 mg Oral Q4H PRN    polyethylene glycol (MIRALAX) packet 17 g  17 g Oral DAILY    sacubitril-valsartan (ENTRESTO) 24-26 mg tablet 1 Tab  1 Tab Oral BID    senna (SENOKOT) tablet 8.6 mg  1 Tab Oral DAILY    simvastatin (ZOCOR) tablet 20 mg  20 mg Oral QHS    tiotropium (SPIRIVA) inhalation capsule 18 mcg  1 Cap Inhalation DAILY    traMADol (ULTRAM) tablet 50 mg  50 mg Oral Q6H PRN    [START ON 5/4/2018] warfarin (COUMADIN) tablet 2.5 mg  2.5 mg Oral Once per day on Mon Fri    warfarin (COUMADIN) tablet 5 mg  5 mg Oral Once per day on Sun Tue Wed Thu Sat     Review of Systems:Denies chest pain, shortness of breath, cough, headache, visual problems, abdominal pain, dysurea, calf pain. Pertinent positives are as noted in the medical records and unremarkable otherwise.   + PRUETT  Visit Vitals    /62    Pulse 74    Temp 98 °F (36.7 °C)    Resp 18    Ht 5' 2\" (1.575 m)    Wt 223 lb (101.2 kg)    SpO2 98%    Breastfeeding No    BMI 40.79 kg/m2        Physical Exam:   General: Alert and age appropriately oriented. No acute cardio respiratory distress. HEENT: Normocephalic,+ scleral hemorrh and echym left eye; EOMI  Oral mucosa moist without cyanosis   Lungs: Clear to auscultation  bilaterally. Respiration even and unlabored   Heart: Regular rate and rhythm, S1, S2   No  murmurs, clicks, rub or gallops   Abdomen: Soft, non-tender, nondistended. Bowel sounds present. No organomegaly. obese   Genitourinary: defer . Neuromuscular:      Generalized weakness throughout; focal weakness RUE prox due to pain in the right shoulder; limited range of motion due to pain  sens intact; rachel taped ring and little fingers on the right; splint to left wrist and hand     Skin/extremity: No rashes, no erythema.  No calf tenderness BLE  Has hematoma to dorsum right hand, tender, no increased warmth, abrasion noted, no drainage  Chronic venous stasis changed LEs                                                                            Functional Assessment:          Balance  Sitting - Static: Good (unsupported) (05/02/18 1600)  Sitting - Dynamic: Good (unsupported) (05/02/18 1600)  Standing - Static: Fair (05/02/18 1600)                     Steven Nixon Fall Risk Assessment:  Steven Nixon Fall Risk  Mobility: Ambulates or transfers with assist devices or assistance (05/03/18 0700)  Mobility Interventions: Patient to call before getting OOB;Utilize walker, cane, or other assitive device (05/03/18 0700)  Mentation: Alert, oriented x 3 (05/03/18 0700)  Medication: Patient receiving anticonvulsants, sedatives(tranquilizers), psychotropics or hypnotics, hypoglycemics, narcotics, sleep aids, antihypertensives, laxatives, or diuretics (05/03/18 0700)  Medication Interventions: Patient to call before getting OOB (05/03/18 0700)  Elimination: Needs assistance with toileting (05/03/18 0700)  Elimination Interventions: Call light in reach; Patient to call for help with toileting needs; Toileting schedule/hourly rounds (05/03/18 0700)  Prior Fall History: Before admission in past 12 months _home or previous inpatient care) (05/03/18 0700)  History of Falls Interventions: Door open when patient unattended (05/03/18 0700)  Total Score: 4 (05/03/18 0700)  High Fall Risk: Yes (05/03/18 0700)     Speech Assessment:         Ambulation:  Gait  Distance (ft): 75 Feet (ft) (05/02/18 1600)  Assistive Device: Gait belt (05/02/18 1600)     Labs/Studies:  Recent Results (from the past 72 hour(s))   PROTHROMBIN TIME + INR    Collection Time: 05/01/18  6:45 AM   Result Value Ref Range    Prothrombin time 30.0 (H) 11.5 - 14.5 sec    INR 2.9     METABOLIC PANEL, BASIC    Collection Time: 05/01/18  6:45 AM   Result Value Ref Range    Sodium 141 136 - 145 mmol/L    Potassium 4.7 3.5 - 5.1 mmol/L    Chloride 97 (L) 98 - 107 mmol/L    CO2 38 (H) 21 - 32 mmol/L    Anion gap 6 (L) 7 - 16 mmol/L    Glucose 114 (H) 65 - 100 mg/dL    BUN 55 (H) 8 - 23 MG/DL    Creatinine 2.17 (H) 0.6 - 1.0 MG/DL    GFR est AA 29 (L) >60 ml/min/1.73m2    GFR est non-AA 24 (L) >60 ml/min/1.73m2    Calcium 8.4 8.3 - 10.4 MG/DL   CBC W/O DIFF    Collection Time: 05/01/18  6:45 AM   Result Value Ref Range    WBC 6.9 4.3 - 11.1 K/uL    RBC 2.70 (L) 4.05 - 5.25 M/uL    HGB 7.9 (L) 11.7 - 15.4 g/dL    HCT 25.1 (L) 35.8 - 46.3 %    MCV 93.0 79.6 - 97.8 FL    MCH 29.3 26.1 - 32.9 PG    MCHC 31.5 31.4 - 35.0 g/dL    RDW 14.0 11.9 - 14.6 %    PLATELET 512 823 - 969 K/uL    MPV 10.8 10.8 - 14.1 FL   PROTHROMBIN TIME + INR    Collection Time: 05/02/18  7:50 AM   Result Value Ref Range    Prothrombin time 30.3 (H) 11.5 - 14.5 sec    INR 2.9     CBC W/O DIFF    Collection Time: 05/02/18  7:50 AM   Result Value Ref Range    WBC 8.4 4.3 - 11.1 K/uL    RBC 2.97 (L) 4.05 - 5.25 M/uL    HGB 8.5 (L) 11.7 - 15.4 g/dL    HCT 27.2 (L) 35.8 - 46.3 %    MCV 91.6 79.6 - 97.8 FL    MCH 28.6 26.1 - 32.9 PG    MCHC 31.3 (L) 31.4 - 35.0 g/dL    RDW 14.1 11.9 - 14.6 %    PLATELET 916 845 - 425 K/uL    MPV 10.3 (L) 10.8 - 72.2 FL   METABOLIC PANEL, BASIC    Collection Time: 05/02/18  7:50 AM   Result Value Ref Range    Sodium 143 136 - 145 mmol/L    Potassium 4.9 3.5 - 5.1 mmol/L    Chloride 100 98 - 107 mmol/L    CO2 37 (H) 21 - 32 mmol/L    Anion gap 6 (L) 7 - 16 mmol/L    Glucose 123 (H) 65 - 100 mg/dL    BUN 56 (H) 8 - 23 MG/DL    Creatinine 1.71 (H) 0.6 - 1.0 MG/DL    GFR est AA 38 (L) >60 ml/min/1.73m2    GFR est non-AA 31 (L) >60 ml/min/1.73m2    Calcium 9.4 8.3 - 10.4 MG/DL   CBC WITH AUTOMATED DIFF    Collection Time: 05/03/18  6:21 AM   Result Value Ref Range    WBC 7.9 4.3 - 11.1 K/uL    RBC 2.86 (L) 4.05 - 5.25 M/uL    HGB 8.4 (L) 11.7 - 15.4 g/dL    HCT 26.4 (L) 35.8 - 46.3 %    MCV 92.3 79.6 - 97.8 FL    MCH 29.4 26.1 - 32.9 PG    MCHC 31.8 31.4 - 35.0 g/dL    RDW 14.2 11.9 - 14.6 %    PLATELET 065 791 - 769 K/uL    MPV 10.3 (L) 10.8 - 14.1 FL    DF AUTOMATED      NEUTROPHILS 62 43 - 78 %    LYMPHOCYTES 26 13 - 44 %    MONOCYTES 9 4.0 - 12.0 %    EOSINOPHILS 3 0.5 - 7.8 %    BASOPHILS 0 0.0 - 2.0 %    IMMATURE GRANULOCYTES 0 0.0 - 5.0 %    ABS. NEUTROPHILS 4.8 1.7 - 8.2 K/UL    ABS. LYMPHOCYTES 2.1 0.5 - 4.6 K/UL    ABS. MONOCYTES 0.7 0.1 - 1.3 K/UL    ABS. EOSINOPHILS 0.2 0.0 - 0.8 K/UL    ABS. BASOPHILS 0.0 0.0 - 0.2 K/UL    ABS. IMM. GRANS. 0.0 0.0 - 0.5 K/UL   METABOLIC PANEL, COMPREHENSIVE    Collection Time: 05/03/18  6:21 AM   Result Value Ref Range    Sodium 141 136 - 145 mmol/L    Potassium 4.5 3.5 - 5.1 mmol/L    Chloride 100 98 - 107 mmol/L    CO2 35 (H) 21 - 32 mmol/L    Anion gap 6 (L) 7 - 16 mmol/L    Glucose 125 (H) 65 - 100 mg/dL    BUN 54 (H) 8 - 23 MG/DL    Creatinine 1.53 (H) 0.6 - 1.0 MG/DL    GFR est AA 43 (L) >60 ml/min/1.73m2    GFR est non-AA 36 (L) >60 ml/min/1.73m2    Calcium 9.6 8.3 - 10.4 MG/DL    Bilirubin, total 0.6 0.2 - 1.1 MG/DL    ALT (SGPT) 19 12 - 65 U/L    AST (SGOT) 16 15 - 37 U/L    Alk.  phosphatase 51 50 - 136 U/L    Protein, total 7.0 6.3 - 8.2 g/dL    Albumin 3.2 3.2 - 4.6 g/dL    Globulin 3.8 (H) 2.3 - 3.5 g/dL    A-G Ratio 0.8 (L) 1.2 - 3.5     PROTHROMBIN TIME + INR    Collection Time: 05/03/18  6:21 AM   Result Value Ref Range    Prothrombin time 28.3 (H) 11.5 - 14.5 sec    INR 2.7         Assessment:     Problem List as of 5/3/2018  Date Reviewed: 4/27/2018          Codes Class Noted - Resolved    Physical debility ICD-10-CM: R53.81  ICD-9-CM: 799.3  5/2/2018 - Present        Closed nondisplaced fracture of shaft of fifth metacarpal bone of left hand ICD-10-CM: S62.357A  ICD-9-CM: 815.03  4/28/2018 - Present        Subdural hematoma ([facility] [address] ICD-10-CM: I62.00  ICD-9-CM: 432.1  4/27/2018 - Present        Long term (current) use of anticoagulants (Chronic) ICD-10-CM: Z79.01  ICD-9-CM: V58.61  4/19/2018 - Present        Dysthymia ICD-10-CM: F34.1  ICD-9-CM: 300.4  4/5/2018 - Present        Type 2 diabetes mellitus with nephropathy (HCC) (Chronic) ICD-10-CM: E11.21  ICD-9-CM: 250.40, 583.81  12/18/2017 - Present        Pulmonary hypertension (HCC) (Chronic) ICD-10-CM: I27.20  ICD-9-CM: 416.8  6/15/2016 - Present S/P AVR (aortic valve replacement) (Chronic) ICD-10-CM: Z95.2  ICD-9-CM: V43.3  Unknown - Present    Overview Signed 2/23/2016 11:04 AM by Low Levin     Mechanical/Artific             Cardiomyopathy [facility]) (Chronic) ICD-10-CM: I42.9  ICD-9-CM: 425.4  Unknown - Present        Osteopenia ICD-10-CM: M85.80  ICD-9-CM: 733.90  Unknown - Present        HLD (hyperlipidemia) (Chronic) ICD-10-CM: E78.5  ICD-9-CM: 272.4  Unknown - Present        Osteoarthritis ICD-10-CM: M19.90  ICD-9-CM: 715.90  Unknown - Present        ICD (implantable cardioverter-defibrillator) in place ICD-10-CM: Z95.810  ICD-9-CM: V45.02  10/2/2014 - Present    Overview Signed 10/2/2014  4:58 PM by Reggie Leo III     Biotronik single-chamber ICD implantation 10/20/14             Diabetes mellitus type 2, diet-controlled ([facility] [address] (Chronic) ICD-10-CM: E11.9  ICD-9-CM: 250.00  8/28/2014 - Present        COPD (chronic obstructive pulmonary disease) ([facility] [address] (Chronic) ICD-10-CM: J44.9  ICD-9-CM: 544  4/2/2014 - Present        REJI (obstructive sleep apnea)-cpap (Chronic) ICD-10-CM: G47.33  ICD-9-CM: 327.23  4/2/2014 - Present        CKD (chronic kidney disease) stage 3, GFR 30-59 ml/min (Chronic) ICD-10-CM: N18.3  ICD-9-CM: 585.3  7/10/2013 - Present        Anticoagulated on Coumadin (Chronic) ICD-10-CM: Z51.81, Z79.01  ICD-9-CM: V58.83, V58.61  7/9/2013 - Present    Overview Signed 7/9/2013  4:16 PM by Hank Diana NP     S/P AVR             CAD (coronary artery disease) (Chronic) ICD-10-CM: I25.10  ICD-9-CM: 414.00  1/20/2013 - Present    Overview Signed 5/20/2014 10:05 AM by Karen Ferreira NP     5/8/14 PCI LAD with stent placed             Chronic combined systolic and diastolic heart failure (HCC) (Chronic) ICD-10-CM: I50.42  ICD-9-CM: 428.42  1/20/2013 - Present    Overview Addendum 4/28/2018  4:53 AM by Dickson Cool MD     5/8/14 ECHO:  EF 10-15%  12/2017:  EF 25-30%             Essential hypertension, benign (Chronic) ICD-10-CM: I10  ICD-9-CM: 401.1  1/20/2013 - Present        MDS (myelodysplastic syndrome) (HCC) (Chronic) ICD-10-CM: D46.9  ICD-9-CM: 238.75  12/17/2011 - Present    Overview Addendum 8/29/2013 11:06 AM by Parth Singh started in August, 2011 12/18/11 Procrit weekly and Iron stores. 5-12 12-13-12 good response to 3 weekly procrit did not need it last time    3-7-13 Pt doing well. Just wanted a \"check-up. \" Responding to Procrit every three weeks. 8-29-13 patient has missed some injections on recently restarted hemoglobin only issue , takes oral iron iron stores each time                Iron deficiency anemia due to chronic blood loss (Chronic) ICD-10-CM: D50.0  ICD-9-CM: 280.0  7/29/2009 - Present        RESOLVED: CHF (congestive heart failure) (HCC) ICD-10-CM: I50.9  ICD-9-CM: 428.0  2/17/2017 - 4/27/2018        RESOLVED: Routine general medical examination at a health care facility ICD-10-CM: Z00.00  ICD-9-CM: V70.0  12/16/2016 - 4/27/2018        RESOLVED: Chest pain, unspecified ICD-10-CM: R07.9  ICD-9-CM: 786.50  12/7/2016 - 12/16/2016        RESOLVED: Chronic left-sided low back pain without sciatica ICD-10-CM: M54.5, G89.29  ICD-9-CM: 724.2, 338.29  6/15/2016 - 4/27/2018        RESOLVED: Tachycardia ICD-10-CM: R00.0  ICD-9-CM: 785.0  Unknown - 12/16/2016    Overview Signed 2/23/2016 11:02 AM by Trevon Marc H/B              RESOLVED: Chronic respiratory failure (HCC) (Chronic) ICD-10-CM: J96.10  ICD-9-CM: 518.83  4/2/2014 - 4/27/2018            S/p fall with bilateral hand fxs, right knee sprain, FELIZ on CKD, anemia with underlying dx of MDS; physical debility  Plan:      Continue daily physician medical management:  Pneumonia prophylaxis- Incentive spirometer every hour while awake     COPD/REJI; pt is on Trilogy at [facility]. She manages this at home but having difficulty do to fxs in her hands  -cont Proventil , Pulmicort; Spiriva;  try weaning daytime O2; currently 4-6L due to hypoxia. Doesn't use during day at home per pt  -5/3 down from 4 to 2L , sats 98%     Severe CHF/cardiomyopathy; compensated currently; cont Coreg, Lasix; has an ICD ; on entresto as well, 5/3  -5/3 no evidence of acute issues     DVT risk / DVT Prophylaxis- Will require daily physician exam to assess for signs and symptoms as patient is at increased risk for of thromboembolism. Mobilization as tolerated. Intermittent pneumatic compression devices when in bed Thigh-high or knee-high thromboembolic deterrent hose when out of bed. On therapeutic Coumadin      Pain Control: ongoing significant pain in back, shoulders, knees and hands which is stable and controlled by PRN meds. Will require regular pain assessment and comprenhensive pain management,   -has end stage OA of the knees and now with right knee sprain; ice/ace wrap, modalities     Wound Care: Monitor wound status daily per staff and physician. At risk for failure. Will require 24/7 rehab nursing. None needed but at risk due to immobility   -5/3 hematoma dorsum right hand; warm compress     Hypertension - BP uncontrolled, fluctuating, managed medically. Has hypotension, asymptomatic, parameters with meds   -5/3 119-141 SBP, controlled     MDS/chronic anemia; monitor , replace as needed, consider epogen  -5/3 hgb stable at 8.4     FELIZ on CKD; improving, monitor labs and adjust meds; daily wt due to CHF  -creat slowly improving 5/3; 1.53 from 1.71 yesterday, BUN remains unchanged, 50's     Chronic coumadin therapy due to mech AVR; goal INR 2-3; inr 2.9; monitor and have pharmacy assist in dosing    ? Hx of DM listed but on no medications ; do have on diet restrictions; carbohydrate restrictions     Urinary retention/ neurogenic bladder - schedule voids q6-8 hrs. Check post-void residual as needed; In and Out catheterize if post-void residual is more than 400 cc.     bowel program - constipation, added bowel program     GERD - resume PPI.  At times may need additional antacids, Maalox prn. Time spent was 25 minutes with over 1/2 in direct patient care/examination, consultation and coordination of care.      Signed By: Jhon Martinez MD     May 3, 2018

## 2018-05-03 NOTE — PROGRESS NOTES
OT Daily Note    Time In 1115   Time Out 1203     Subjective: \"If it gets in my way ill eat it (taking about her lunch)\" Agreeable to therapy. Pain:Still severe and not functional when attempting to use RUE. Education:On adaptive tools for feeding. Interdisciplinary Communication: Collaborated with Willian Allen and patient is making progress towards goals. Precautions: Other (comment) (fall precautions, NWB Left hand)    Balance/functional mobility Daily Assessment   Ambulated 10' with no device. Sit to stand required moderate assist.      RUE ROM Daily Assessment   Supine on mat attempted RUE ROM with slightly increase success but still less than 15 degree motion in abduction, flexion, and ER. Feeding Daily Assessment   Introduced plate guard, built up handle and dycem for modifications to allow self feeding. Dycem worked well to not allow plate to slide. Patient dexterity greatly improved with built up handle on spoon. Improved feeding during session. Recommend extra long straws at home to allow accessibility to drink. Plate guard worked best when opening was on the left to allow LUE to feed. Ended session:up in recliner and finished with lunch. All needs within reach.      Harpreet Fierro OTR/L

## 2018-05-03 NOTE — PROGRESS NOTES
PHYSICAL THERAPY DAILY NOTE  Time In: 1302  Time Out: 2922  Patient Seen For: PM;Balance activities;Gait training;Patient education; Therapeutic exercise;Transfer training; Other (see progress notes)    Subjective: patient reporting she feels OK. Reports she feels like her walking is better. Reports it is still difficulty to transfer sit to stand because she cannot use her hands         Objective:Vital Signs:  Patient Vitals for the past 12 hrs:   Temp Pulse Resp BP SpO2   05/03/18 1509 98 °F (36.7 °C) 73 16 119/69 91 %   05/03/18 0746 98 °F (36.7 °C) 74 18 119/62 98 %   05/03/18 0548 - - - - 96 %     Pain level: visual pain scale of 0 to 4 out of 10  Pain location:lateral aspect of both hands and right knee  Pain interventions:Pain medication per RN, rest, positioning,ROM for right knee      Patient education:Balance training,transfer training, gait training, fall precautions, activity pacing, body mechanics, energy conservation, Patient verbalizing understanding and demonstrating understanding of patient education. Recommend follow up education. Interdisciplinary Communication:NA    Other (comment) (fall precautions, NWB Left hand)  GROSS ASSESSMENT Daily Assessment     NA       BED/MAT MOBILITY Daily Assessment    Rolling Right : 0 (Not tested)  Rolling Left : 0 (Not tested)  Supine to Sit : 0 (Not tested)  Sit to Supine : 0 (Not tested)       TRANSFERS Daily Assessment   Increased time and effort to complete with cues for body mechanics   Transfer Type: SPT without device  Transfer Assistance : 4 (Contact guard assistance)  Sit to Stand Assistance: Minimal assistance  Car Transfers: Not tested       GAIT Daily Assessment    3 min rest breaks between attempts Amount of Assistance: 4 (Contact guard assistance)  Distance (ft): 20 Feet (ft) (20ft x 3 in figure 8 pattern)  Assistive Device: Gait belt   Gait training x 20 ft x 3 in figure 8 pattern around bolsters with no device and CGA.  No loss of balance making multiple turns. STEPS or STAIRS Daily Assessment    Steps/Stairs Ambulated (#): 0  Level of Assist : 0 (Not tested)       BALANCE Daily Assessment    Sitting - Static: Good (unsupported)  Sitting - Dynamic: Good (unsupported)  Standing - Static: Fair  Standing - Dynamic : Impaired       WHEELCHAIR MOBILITY Daily Assessment    Curbs/Ramps Assist Required (FIM Score): 0 (Not tested)  Wheelchair Setup Assist Required : 0 (Not tested)       LOWER EXTREMITY EXERCISES Daily Assessment    Extremity: Both  Exercise Type #1: Other (comment) (LE motomed x 10 mins at level 1)  Level of Assist: Supervision          Assessment: Patient progressing towards goals. Gait balance improving. Functional endurance improving       Patient returned to room at end of treatment and remained up in recliner with LEs elevated and with needs in reach. Plan of Care: Continue with POC and progress as tolerated.      Rodrigo Whitley, PT  5/3/2018

## 2018-05-03 NOTE — PROGRESS NOTES
Warfarin dosing per pharmacist    Johnnie Marrgeoffrey Marquez is a 77 y.o. female. Height: 5' 2\" (157.5 cm)    Weight: 101.2 kg (223 lb)    Indication:  Mechanical aortic valve replacement    Goal INR:  2-3    Home dose:  2.5 mg Mon, Fri; 5 mg all other days    Risk factors or significant drug interactions:  none    Other anticoagulants:  none    Daily Monitoring  Date  INR     Warfarin dose HGB              Notes  4/28  2.1          5 mg + 2.5 mg 7.4  4/29  2.4  5 mg  7  4/30  2.6  2.5 mg  8  5/1  2.9  5 mg  7.9  5/2  2.9  5 mg  8.5  Pharmacy consulted  5/3  2.7  5 mg  8.4    Pharmacy consulted to dose warfarin for Ms. Corral. She has been transferred to inpatient rehab following hospital stay for complications following a fall. She is followed by [facility] at the anti-coag clinic and was therapeutic on her home dose at her last visit on 4/19/2018. INR 2.7. No changes, continue PTA dosing. Following daily currently. Pharmacy will continue to follow. Please call with any questions. Thank you,  Tani Quick, Pharm. D.   Clinical Pharmacist  [phone]

## 2018-05-03 NOTE — PROGRESS NOTES
Pt called out. Pt reporting chest pain. Pt reports feeling pain/ nausea under L breast and down L arm. VS obtained and O2 sat=55%. Pt was not connected to continuous O2 at 3l as she wears chronically. O2 turned on and O2 sat immediately increased to >90%. Pt reports that chest pain immediately resolved when O2 was turned on.

## 2018-05-03 NOTE — PROGRESS NOTES
05/03/18 0830   Time With Patient   Time In 0830   Time Out 0915   Eating   Functional Level 1   Grooming   Functional Level 2   Tasks Completed by Patient Washed hands; Washed face;Brushed hair   Comments patient did 1 of 3 tasks   Bathing   Functional Level 1   Body Parts Patient Bathed Chest;Abdomen   Comments patient required assist with 8 out of 10 parts   Tub/Shower Transfer [place]   Comments not completed during evaluation as she sponge bathes at home   Upper Body Dressing/Undressing   Functional Level 2   Items Applied/Steps Completed Pullover (4 steps)   Comments able to don over head   Lower Body Dressing/Undressing   Functional Level 1   Items Applied/Steps Completed Sock, right (1 step); Sock, left (1 step); Underpants (3 steps); Elastic waist pants (3 steps)   Comments Assist all parts   Toileting   Functional Level 1   Comments assist all parts   Toilet Transfer [place]   Toilet Transfer Score 2   Comments maximal assist     S: \"I can get up and wash off a little by the sink. \" Agreeable to therapy. Focus of session was on morning ADL routine in room, daughter present for questions. In gym worked some level 1 joint mobilization at right shoulder, patient very guarded. ROM with no resistance bilateral non taped fingers all within limits of no pain. Patient was able to ambulate ~10 feet using no device with moderate assist.   Pain in right mid humerus. Collaborated with PT, Anand Butler and confirmed patient is on track to reach goals as documented in the care plan. Patient tolerated session well, but pain, ROM, balance, activity tolerance are still below baseline and requires skilled facilitation to successfully and safely complete ADL's and transfers. Patient ended session in w/c in therapy gym.     KIRSTY Salguero

## 2018-05-04 LAB
INR PPP: 2.7
PROTHROMBIN TIME: 28 SEC (ref 11.5–14.5)

## 2018-05-04 PROCEDURE — 77010033678 HC OXYGEN DAILY

## 2018-05-04 PROCEDURE — 97110 THERAPEUTIC EXERCISES: CPT

## 2018-05-04 PROCEDURE — 36415 COLL VENOUS BLD VENIPUNCTURE: CPT | Performed by: PHYSICAL MEDICINE & REHABILITATION

## 2018-05-04 PROCEDURE — 94760 N-INVAS EAR/PLS OXIMETRY 1: CPT

## 2018-05-04 PROCEDURE — 97530 THERAPEUTIC ACTIVITIES: CPT

## 2018-05-04 PROCEDURE — 97535 SELF CARE MNGMENT TRAINING: CPT

## 2018-05-04 PROCEDURE — 85610 PROTHROMBIN TIME: CPT | Performed by: PHYSICAL MEDICINE & REHABILITATION

## 2018-05-04 PROCEDURE — 97116 GAIT TRAINING THERAPY: CPT

## 2018-05-04 PROCEDURE — 74011000250 HC RX REV CODE- 250: Performed by: PHYSICAL MEDICINE & REHABILITATION

## 2018-05-04 PROCEDURE — 65310000000 HC RM PRIVATE REHAB

## 2018-05-04 PROCEDURE — 94640 AIRWAY INHALATION TREATMENT: CPT

## 2018-05-04 PROCEDURE — 99233 SBSQ HOSP IP/OBS HIGH 50: CPT | Performed by: PHYSICAL MEDICINE & REHABILITATION

## 2018-05-04 PROCEDURE — 74011250637 HC RX REV CODE- 250/637: Performed by: PHYSICAL MEDICINE & REHABILITATION

## 2018-05-04 RX ORDER — LANOLIN ALCOHOL/MO/W.PET/CERES
1 CREAM (GRAM) TOPICAL 2 TIMES DAILY WITH MEALS
Status: DISCONTINUED | OUTPATIENT
Start: 2018-05-04 | End: 2018-05-16 | Stop reason: HOSPADM

## 2018-05-04 RX ADMIN — ESCITALOPRAM 10 MG: 10 TABLET, FILM COATED ORAL at 08:48

## 2018-05-04 RX ADMIN — TRAMADOL HYDROCHLORIDE 50 MG: 50 TABLET, FILM COATED ORAL at 21:22

## 2018-05-04 RX ADMIN — CARVEDILOL 3.12 MG: 3.12 TABLET, FILM COATED ORAL at 08:48

## 2018-05-04 RX ADMIN — ALBUTEROL SULFATE 2.5 MG: 2.5 SOLUTION RESPIRATORY (INHALATION) at 11:15

## 2018-05-04 RX ADMIN — SENNOSIDES 8.6 MG: 8.6 TABLET, FILM COATED ORAL at 08:52

## 2018-05-04 RX ADMIN — FERROUS SULFATE TAB 325 MG (65 MG ELEMENTAL FE) 325 MG: 325 (65 FE) TAB at 08:00

## 2018-05-04 RX ADMIN — Medication 10 ML: at 13:41

## 2018-05-04 RX ADMIN — WARFARIN SODIUM 2.5 MG: 2.5 TABLET ORAL at 18:00

## 2018-05-04 RX ADMIN — FUROSEMIDE 80 MG: 40 TABLET ORAL at 16:11

## 2018-05-04 RX ADMIN — FERROUS SULFATE TAB 325 MG (65 MG ELEMENTAL FE) 325 MG: 325 (65 FE) TAB at 16:13

## 2018-05-04 RX ADMIN — ALBUTEROL SULFATE 2.5 MG: 2.5 SOLUTION RESPIRATORY (INHALATION) at 05:43

## 2018-05-04 RX ADMIN — OXYCODONE HYDROCHLORIDE 10 MG: 5 TABLET ORAL at 08:46

## 2018-05-04 RX ADMIN — OXYCODONE HYDROCHLORIDE 10 MG: 5 TABLET ORAL at 14:37

## 2018-05-04 RX ADMIN — SACUBITRIL AND VALSARTAN 1 TABLET: 24; 26 TABLET, FILM COATED ORAL at 16:12

## 2018-05-04 RX ADMIN — TRAMADOL HYDROCHLORIDE 50 MG: 50 TABLET, FILM COATED ORAL at 16:07

## 2018-05-04 RX ADMIN — FUROSEMIDE 80 MG: 40 TABLET ORAL at 08:48

## 2018-05-04 RX ADMIN — SIMVASTATIN 20 MG: 20 TABLET, FILM COATED ORAL at 21:22

## 2018-05-04 RX ADMIN — CARVEDILOL 3.12 MG: 3.12 TABLET, FILM COATED ORAL at 16:10

## 2018-05-04 RX ADMIN — POLYETHYLENE GLYCOL (3350) 17 G: 17 POWDER, FOR SOLUTION ORAL at 09:30

## 2018-05-04 RX ADMIN — BUDESONIDE 500 MCG: 0.5 INHALANT RESPIRATORY (INHALATION) at 05:43

## 2018-05-04 RX ADMIN — SACUBITRIL AND VALSARTAN 1 TABLET: 24; 26 TABLET, FILM COATED ORAL at 08:48

## 2018-05-04 RX ADMIN — TIOTROPIUM BROMIDE 18 MCG: 18 CAPSULE ORAL; RESPIRATORY (INHALATION) at 05:42

## 2018-05-04 RX ADMIN — ALBUTEROL SULFATE 2.5 MG: 2.5 SOLUTION RESPIRATORY (INHALATION) at 18:02

## 2018-05-04 RX ADMIN — BUDESONIDE 500 MCG: 0.5 INHALANT RESPIRATORY (INHALATION) at 18:02

## 2018-05-04 RX ADMIN — FERROUS SULFATE TAB 325 MG (65 MG ELEMENTAL FE) 325 MG: 325 (65 FE) TAB at 08:49

## 2018-05-04 RX ADMIN — Medication 5 ML: at 20:36

## 2018-05-04 NOTE — PROGRESS NOTES
PHYSICAL THERAPY DAILY NOTE  Time In: 5706  Time Out: 1521  Patient Seen For: PM;Balance activities;Gait training;Patient education; Therapeutic exercise;Transfer training; Other (see progress notes)    Subjective: Patient reporting she would like to go outside. Reports she has 2 different ways to get into her apartment. One way has a sidewalk and the other way she has to walk across grass. Reports small incline         Objective:Vital Signs:02 sat 89% after ambulating 60 ft on 02 at 3 lpm  Patient Vitals for the past 12 hrs:   Temp Pulse Resp BP SpO2   05/04/18 1612 - 84 - 128/72 -   05/04/18 1528 98 °F (36.7 °C) 71 18 97/57 97 %   05/04/18 1115 - - - - 96 %   05/04/18 0828 98.7 °F (37.1 °C) 81 20 120/70 95 %   05/04/18 0547 - - - - 96 %     Pain level: 4 to 8 out of 10  Pain location:both hands and right knee  Pain interventions:Pain medication per RN, rest, positioning,body mechanics    Patient education:Balance training,transfer training, gait training, fall precautions, activity pacing, body mechanics, breathing techniques during functional mobility, energy conservation, Patient verbalizing understanding and demonstrating understanding of patient education. Recommend follow up education.     Interdisciplinary Communication:Spoke with RN regarding pain medication RN issued patient pain medication    Other (comment) (fall precautions, NWB Left hand)  GROSS ASSESSMENT Daily Assessment     NA       BED/MAT MOBILITY Daily Assessment    Rolling Right : 0 (Not tested)  Rolling Left : 0 (Not tested)  Supine to Sit : 0 (Not tested)  Sit to Supine : 0 (Not tested)       TRANSFERS Daily Assessment   Increased time and effort to complete with cues for body mechanics   Transfer Type: SPT without device  Transfer Assistance : 5 (Stand-by assistance)  Sit to Stand Assistance: Minimal assistance  Car Transfers: Not tested       GAIT Daily Assessment   5 minute rest breaks between attempts Amount of Assistance: 5 (Stand-by assistance)  Distance (ft): 60 Feet (ft) (60ft x 2 outside on concrete surfaces)  Assistive Device:  (no device)   Gait training with cues for managing 02 line    STEPS or STAIRS Daily Assessment    Steps/Stairs Ambulated (#): 0  Level of Assist : 0 (Not tested)       BALANCE Daily Assessment    Sitting - Static: Good (unsupported)  Sitting - Dynamic: Good (unsupported)  Standing - Static: Fair  Standing - Dynamic : Impaired       WHEELCHAIR MOBILITY Daily Assessment    Curbs/Ramps Assist Required (FIM Score): 0 (Not tested)  Wheelchair Setup Assist Required : 0 (Not tested)       LOWER EXTREMITY EXERCISES Daily Assessment    Extremity: Both  Exercise Type #1: Seated lower extremity strengthening: LAQ, hip+jesús flex<>ext  Sets Performed: 3  Reps Performed: 10  Level of Assist: Minimal assistance          Assessment: Progressing towards modified independence with short distance household ambulation. Improving with ability to manage 02 line and control breathing pattern. Ongoing difficulty with sit to stand transfers due to inability to use UEs to assist       Patient returned to room at end of treatment and remained up in recliner with LEs elevated and with needs in reach. 02 at 3 lpm    Plan of Care: Continue with POC and progress as tolerated.      Efrain Stanley, PT  5/4/2018

## 2018-05-04 NOTE — PROGRESS NOTES
End Of Shift Functional Summary, Nursing        TOILETING:    Does patient need assist with adjusting pants up or down and/or pericare? yes  If yes, please indicate what the patient needs help with:  Pants up and down and pericare  (i.e. pants up, pants down, pericare)  Pt uses none. Does the patient require extra time? yes  Does the patient require standby assistance? no  Does the patient require contact guard assistance? yes  Does the patient require more than one staff member for assistance? no     TOILET TRANSFER:    Pt requires minimal assistance. Pt uses none. Does the patient require extra time? yes  Does the patient require contact guard assistance? yes  Does the patient require more than one staff member for assistance? no     BLADDER:    Pt does not have a medina catheter that staff manages. Pt does not take medication. If so, please indicate which medication:    Pt is continent.  of bladder and voids in bedside commode  Pt requires staff to empty device Pt has had 0 bladder accidents during this shift.  (An accident is when the episode is not contained in a brief AND/OR the clothing/linen requires changing/cleaning up.)        Documentation reviewed and plan of care discussed/reviewed with   patient and oncoming nurse during the shift.

## 2018-05-04 NOTE — PROGRESS NOTES
Report received from previous shift nurse on pts history and status. Pt sitting up in chair, Denies any discomfort at this time. OT in with patient.

## 2018-05-04 NOTE — PROGRESS NOTES
Problem: Falls - Risk of  Goal: *Absence of Falls  Document Tia Fall Risk and appropriate interventions in the flowsheet.    Outcome: Progressing Towards Goal  Fall Risk Interventions:  Mobility Interventions: Assess mobility with egress test, Communicate number of staff needed for ambulation/transfer, OT consult for ADLs, PT Consult for assist device competence, PT Consult for mobility concerns, Patient to call before getting OOB, Strengthening exercises (ROM-active/passive), Utilize walker, cane, or other assitive device         Medication Interventions: Assess postural VS orthostatic hypotension, Evaluate medications/consider consulting pharmacy, Patient to call before getting OOB, Teach patient to arise slowly    Elimination Interventions: Call light in reach, Elevated toilet seat, Patient to call for help with toileting needs, Toilet paper/wipes in reach, Toileting schedule/hourly rounds    History of Falls Interventions: Consult care management for discharge planning, Door open when patient unattended, Investigate reason for fall

## 2018-05-04 NOTE — PROGRESS NOTES
Problem: Falls - Risk of  Goal: *Absence of Falls  Document Tia Fall Risk and appropriate interventions in the flowsheet. Outcome: Progressing Towards Goal  Fall Risk Interventions:  Mobility Interventions: Patient to call before getting OOB         Medication Interventions: Assess postural VS orthostatic hypotension    Elimination Interventions: Call light in reach    History of Falls Interventions: Consult care management for discharge planning        Problem: Pressure Injury - Risk of  Goal: *Prevention of pressure injury  Document Bahman Scale and appropriate interventions in the flowsheet.    Outcome: Progressing Towards Goal  Pressure Injury Interventions:       Moisture Interventions: Absorbent underpads    Activity Interventions: Assess need for specialty bed    Mobility Interventions: Assess need for specialty bed    Nutrition Interventions: Document food/fluid/supplement intake    Friction and Shear Interventions: Apply protective barrier, creams and emollients

## 2018-05-04 NOTE — PROGRESS NOTES
Spoke to patient's daughter regarding dc plans and date. Dtr states that pt will go to her own home and they will provide assistance. One of pt's daughter's will stay at night. Jose Alberto Cerda and granddtr will provide assistance during the day as well as pt's sitter. Pt will only be alone a few hours of day. Family training scheduled for Mon,  May 14 at 8:30.

## 2018-05-04 NOTE — PROGRESS NOTES
Warfarin dosing per pharmacist    Charles Mark Mccloud is a 77 y.o. female. Height: 5' 2\" (157.5 cm)    Weight: 101.2 kg (223 lb)    Indication:  Mechanical aortic valve replacement    Goal INR:  2-3    Home dose:  2.5 mg Mon, Fri; 5 mg all other days    Risk factors or significant drug interactions:  none    Other anticoagulants:  none    Daily Monitoring  Date  INR     Warfarin dose HGB              Notes  4/28  2.1          5 mg + 2.5 mg 7.4  4/29  2.4  5 mg  7  4/30  2.6  2.5 mg  8  5/1  2.9  5 mg  7.9  5/2  2.9  5 mg  8.5  Pharmacy consulted  5/3  2.7  5 mg  8.4  5/4  2.7  2.5 mg  ---      Pharmacy consulted to dose warfarin for Ms. Corral. She has been transferred to inpatient rehab following hospital stay for complications following a fall. She is followed by [facility] at the anti-coag clinic and was therapeutic on her home dose at her last visit on 4/19/2018. INR 2.7. No changes, continue PTA dosing. Following daily currently. Thank you,  Vinod Li, Pharm. D.   Clinical Pharmacist  [phone]

## 2018-05-04 NOTE — PROGRESS NOTES
OT Daily Note    Time In 1118   Time Out 1200     Subjective:\"My arm feels a little better thank you\" Agreeable to therapy. Pain:Tolerated during RUE stretches. Education:On adaptive eating techniques as well as benefits of stretches. Interdisciplinary Communication: Collaborated with Henrietta Kocher and patient is making progress towards goals. Precautions: Other (comment) (fall precautions, NWB Left hand)    RUE ROM Daily Assessment   Sitting at edge of chair re-attempted RUE ROM with slightly increase success from previous day. Some light scapular joint mobilization.         Feeding Daily Assessment   Continued use of plate guard, built up handle and dycem for modifications to allow self feeding. Dycem worked well to not allow plate to slide. Patient dexterity greatly improved with built up handle on spoon. Continues to mprove feeding during treatments. Plate guard worked best when opening was on the left to allow LUE to feed.         Ended session:In recliner, all needs within reach.      Emanuel Hernandez OTR/L

## 2018-05-04 NOTE — PROGRESS NOTES
05/04/18 1028   Time Spent With Patient   Time In 0700   Time Out 0750   Patient Seen For: AM;ADLs   Feeding/Eating   Feeding/Eating Assistance SBA   Adaptive Equipment Non-skid surface;Built up spoon;Built up fork;Plate guard   Comments s/u with adaptive eqipement, did not view feeding. Grooming   Grooming Assistance  Mod A   Comments patient able to wash face. Upper Body Bathing   Bathing Assistance, Upper Max A   Position Performed Seated in chair   Adaptive Equipment Tub bench   Lower Body Bathing   Bathing Assistance, Lower  Max A   Position Performed Seated in chair;Standing   Adaptive Equipment Tub bench   Upper Body [facility]   Lower Body Dressing    Dressing Assistance  Dep   Functional Transfers   Tub or Shower Type Shower   Amount of Assistance Required Mod A   Adaptive Equipment Tub transfer bench;Grab bars     S: \"I am thankful for that shower, that felt great. Ruma Yip \" Agreeable to therapy. Focus of session was on morning ADL routine, including shower. Patient was able to ambulate ~10 feet using HHA with moderate assist.   Pain not indicated during session. Collaborated with PT, Fanny Cortés and confirmed patient is on track to reach goals as documented in the care plan. Patient tolerated session well, but strength, balance, activity tolerance, pain, ROM are still below baseline and requires skilled facilitation to successfully and safely complete ADL's and transfers. Patient ended session in recliner with call pad, setup for breakfast, and TV remote within reach.      Jame Vides OTR

## 2018-05-04 NOTE — PROGRESS NOTES
PHYSICAL THERAPY DAILY NOTE  Time In: 7767  Time Out: 1003  Patient Seen For: AM;Balance activities;Gait training;Patient education; Therapeutic exercise;Transfer training; Other (see progress notes)    Subjective: Patient reporting she feels OK. Reports her right knee is feeling better. Reports she does not have a long way to walk inside her apartment         Objective:Vital Signs:patient on 02 at 3 lpm. 02 sat 89% after ambulating 100ft  Patient Vitals for the past 12 hrs:   Temp Pulse Resp BP SpO2   05/04/18 0828 98.7 °F (37.1 °C) 81 20 120/70 95 %   05/04/18 0547 - - - - 96 %   05/03/18 2353 - - - - 96 %     Pain level:  2 to 4 out of 10  Pain location:right knee and lateral aspect of both hands  Pain interventions:Pain medication per RN, rest, positioning,ROM for knee    Patient education:Balance training,transfer training, gait training, fall precautions, activity pacing, NWB precautions Left hand, energy conservation, breathing techniques during mobility, Patient verbalizing understanding and demonstrating  understanding of patient education. Recommend follow up education.       Interdisciplinary Communication:NA      Other (comment) (fall precautions, NWB Left hand)  GROSS ASSESSMENT Daily Assessment     NA       BED/MAT MOBILITY Daily Assessment    Rolling Right : 0 (Not tested)  Rolling Left : 0 (Not tested)  Supine to Sit : 0 (Not tested)  Sit to Supine : 0 (Not tested)       TRANSFERS Daily Assessment   Cues to inhibit excessive trunk flexion during sit to stand Transfer Type: SPT without device  Transfer Assistance : 5 (Stand-by assistance)  Sit to Stand Assistance: Minimal assistance (x 1 from recliner, SBA to CGA from w/c)  Car Transfers: Not tested       GAIT Daily Assessment    Amount of Assistance: 5 (Stand-by assistance), cues for controlling gait speed and respiratory rate, cues for breathing pattrern  Distance (ft): 100 Feet (ft)  Assistive Device:  (No device)   Gait training x 20 ft x 1 in figure 8 pattern around bolsters with SBA, no loss of balance making multiple turns    STEPS or STAIRS Daily Assessment    Steps/Stairs Ambulated (#): 0  Level of Assist : 0 (Not tested)       BALANCE Daily Assessment    Sitting - Static: Good (unsupported)  Sitting - Dynamic: Good (unsupported)  Standing - Static: Fair  Standing - Dynamic : Impaired       WHEELCHAIR MOBILITY Daily Assessment    Curbs/Ramps Assist Required (FIM Score): 0 (Not tested)  Wheelchair Setup Assist Required : 0 (Not tested)       LOWER EXTREMITY EXERCISES Daily Assessment    Extremity: Both  Exercise Type #1: Other (comment) (LE motomed x 10 mins at level 1)  Level of Assist: Supervision          Assessment: Progressing towards modified independence with SPT and gait without a device. Difficulty with sit<>stand due to inability to use UEs       Patient returned to room at end of treatment and remained up in recliner with LEs elevated and with needs in reach. 02 at 3 lpm    Plan of Care: Continue with POC and progress as tolerated.      Geneva Madrid, PT  5/4/2018

## 2018-05-04 NOTE — PROGRESS NOTES
Candis Wolf MD,   Medical Director  [address] [facility], [address] [facility]  Tel: [phone]       [facility] PROGRESS NOTE    Hannah Mendez  Admit Date: 5/2/2018  Admit Diagnosis: Debillity ; Physical debility    Subjective     Had cp last noc, discovered RT had not hooked her back up to her O2 last noc and sats were in the 60's; rebounded quickly and cp resolved when O2 reinstated. \" I just knew something was wrong. \" feels well this a.m.  Showered with OT and had new dressing placed on hands/ ACE and rachel tape; denies pain    Objective:     Current Facility-Administered Medications   Medication Dose Route Frequency    ferrous sulfate tablet 325 mg  1 Tab Oral DAILY WITH BREAKFAST    0.9% sodium chloride infusion 250 mL  250 mL IntraVENous PRN    0.9% sodium chloride infusion 250 mL  250 mL IntraVENous PRN    acetaminophen (TYLENOL) tablet 650 mg  650 mg Oral Q4H PRN    bisacodyl (DULCOLAX) tablet 5 mg  5 mg Oral DAILY PRN    diphenhydrAMINE (BENADRYL) capsule 25 mg  25 mg Oral Q4H PRN    LORazepam (ATIVAN) tablet 1 mg  1 mg Oral BID PRN    naloxone (NARCAN) injection 0.4 mg  0.4 mg IntraVENous PRN    prochlorperazine (COMPAZINE) injection 5 mg  5 mg IntraVENous Q8H PRN    sodium chloride (NS) flush 5-10 mL  5-10 mL IntraVENous Q8H    sodium chloride (NS) flush 5-10 mL  5-10 mL IntraVENous PRN    albuterol (PROVENTIL VENTOLIN) nebulizer solution 2.5 mg  2.5 mg Nebulization Q4H PRN    bisacodyl (DULCOLAX) suppository 10 mg  10 mg Rectal DAILY PRN    budesonide (PULMICORT) 500 mcg/2 ml nebulizer suspension  500 mcg Nebulization BID RT    And    albuterol (PROVENTIL VENTOLIN) nebulizer solution 2.5 mg  2.5 mg Nebulization Q6HWA RT    carvedilol (COREG) tablet 3.125 mg  3.125 mg Oral BID WITH MEALS    escitalopram oxalate (LEXAPRO) tablet 10 mg  10 mg Oral DAILY    furosemide (LASIX) tablet 80 mg  80 mg Oral BID    loratadine (CLARITIN) tablet 10 mg 10 mg Oral DAILY PRN    oxyCODONE IR (ROXICODONE) tablet 10 mg  10 mg Oral Q4H PRN    polyethylene glycol (MIRALAX) packet 17 g  17 g Oral DAILY    sacubitril-valsartan (ENTRESTO) 24-26 mg tablet 1 Tab  1 Tab Oral BID    senna (SENOKOT) tablet 8.6 mg  1 Tab Oral DAILY    simvastatin (ZOCOR) tablet 20 mg  20 mg Oral QHS    tiotropium (SPIRIVA) inhalation capsule 18 mcg  1 Cap Inhalation DAILY    traMADol (ULTRAM) tablet 50 mg  50 mg Oral Q6H PRN    warfarin (COUMADIN) tablet 2.5 mg  2.5 mg Oral Once per day on Mon Fri    warfarin (COUMADIN) tablet 5 mg  5 mg Oral Once per day on Sun Tue Wed Thu Sat     Review of Systems:Denies chest pain, shortness of breath, cough, headache, visual problems, abdominal pain, dysurea, calf pain. Pertinent positives are as noted in the medical records and unremarkable otherwise. Less edema, less PRUETT  Visit Vitals    /66 (BP 1 Location: Left arm, BP Patient Position: At rest)    Pulse 84    Temp 98.3 °F (36.8 °C)    Resp 18    Ht 5' 2\" (1.575 m)    Wt 223 lb (101.2 kg)    SpO2 96%    Breastfeeding No    BMI 40.79 kg/m2    3L O2; baseline    Physical Exam:   General: Alert and age appropriately oriented. No acute cardio respiratory distress. HEENT: Normocephalic,no scleral icterus  Oral mucosa moist without cyanosis; left scleral hemorrhage unchanged but dec ecchymosis      Lungs: Clear to auscultation  Bilaterally. Dec bases  Respiration even and unlabored   Heart: Regular rate and rhythm, S1, S2   No  murmurs, clicks, rub or gallops   Abdomen: Soft, non-tender, nondistended. Bowel sounds present. No organomegaly. obese   Genitourinary: defer   Neuromuscular:      stength inc in LEs. sens intact  Moving untaped fingers better with dec edema; still hematoma to dorsum left hand but dec in size and less tender  AAOx4 ; RUE weakness chonic   Skin/extremity: No rashes, no erythema. No calf tenderness BLE  Trace pedal edema.  Chronic venous stasis Functional Assessment:          Balance  Sitting - Static: Good (unsupported) (05/03/18 1300)  Sitting - Dynamic: Good (unsupported) (05/03/18 1300)  Standing - Static: Fair (05/03/18 1300)  Standing - Dynamic : Impaired (05/03/18 1300)                     Estelle Brantley Fall Risk Assessment:  [facility] Fall Risk  Mobility: Ambulates or transfers with assist devices or assistance (05/03/18 2239)  Mobility Interventions: Assess mobility with egress test;Communicate number of staff needed for ambulation/transfer;OT consult for ADLs;PT Consult for assist device competence;PT Consult for mobility concerns; Patient to call before getting OOB; Strengthening exercises (ROM-active/passive); Utilize walker, cane, or other assitive device (05/03/18 2239)  Mentation: Alert, oriented x 3 (05/03/18 2239)  Medication: Patient receiving anticonvulsants, sedatives(tranquilizers), psychotropics or hypnotics, hypoglycemics, narcotics, sleep aids, antihypertensives, laxatives, or diuretics (05/03/18 2239)  Medication Interventions: Assess postural VS orthostatic hypotension; Evaluate medications/consider consulting pharmacy; Patient to call before getting OOB; Teach patient to arise slowly (05/03/18 2239)  Elimination: Needs assistance with toileting (05/03/18 2239)  Elimination Interventions: Call light in reach;Elevated toilet seat;Patient to call for help with toileting needs; Toilet paper/wipes in reach; Toileting schedule/hourly rounds (05/03/18 2239)  Prior Fall History: Before admission in past 12 months _home or previous inpatient care) (05/03/18 2239)  History of Falls Interventions: Consult care management for discharge planning;Door open when patient unattended; Investigate reason for fall (05/03/18 2239)  Total Score: 4 (05/03/18 2239)  Standard Fall Precautions: Yes (05/03/18 2239)  High Fall Risk: Yes (05/03/18 2239)     Speech Assessment:         Ambulation:  Gait  Distance (ft): 20 Feet (ft) (20ft x 3 in figure 8 pattern) (05/03/18 1300)  Assistive Device: Gait belt (05/03/18 1300)     Labs/Studies:  Recent Results (from the past 72 hour(s))   PROTHROMBIN TIME + INR    Collection Time: 05/02/18  7:50 AM   Result Value Ref Range    Prothrombin time 30.3 (H) 11.5 - 14.5 sec    INR 2.9     CBC W/O DIFF    Collection Time: 05/02/18  7:50 AM   Result Value Ref Range    WBC 8.4 4.3 - 11.1 K/uL    RBC 2.97 (L) 4.05 - 5.25 M/uL    HGB 8.5 (L) 11.7 - 15.4 g/dL    HCT 27.2 (L) 35.8 - 46.3 %    MCV 91.6 79.6 - 97.8 FL    MCH 28.6 26.1 - 32.9 PG    MCHC 31.3 (L) 31.4 - 35.0 g/dL    RDW 14.1 11.9 - 14.6 %    PLATELET 138 581 - 604 K/uL    MPV 10.3 (L) 10.8 - 01.2 FL   METABOLIC PANEL, BASIC    Collection Time: 05/02/18  7:50 AM   Result Value Ref Range    Sodium 143 136 - 145 mmol/L    Potassium 4.9 3.5 - 5.1 mmol/L    Chloride 100 98 - 107 mmol/L    CO2 37 (H) 21 - 32 mmol/L    Anion gap 6 (L) 7 - 16 mmol/L    Glucose 123 (H) 65 - 100 mg/dL    BUN 56 (H) 8 - 23 MG/DL    Creatinine 1.71 (H) 0.6 - 1.0 MG/DL    GFR est AA 38 (L) >60 ml/min/1.73m2    GFR est non-AA 31 (L) >60 ml/min/1.73m2    Calcium 9.4 8.3 - 10.4 MG/DL   CBC WITH AUTOMATED DIFF    Collection Time: 05/03/18  6:21 AM   Result Value Ref Range    WBC 7.9 4.3 - 11.1 K/uL    RBC 2.86 (L) 4.05 - 5.25 M/uL    HGB 8.4 (L) 11.7 - 15.4 g/dL    HCT 26.4 (L) 35.8 - 46.3 %    MCV 92.3 79.6 - 97.8 FL    MCH 29.4 26.1 - 32.9 PG    MCHC 31.8 31.4 - 35.0 g/dL    RDW 14.2 11.9 - 14.6 %    PLATELET 863 879 - 720 K/uL    MPV 10.3 (L) 10.8 - 14.1 FL    DF AUTOMATED      NEUTROPHILS 62 43 - 78 %    LYMPHOCYTES 26 13 - 44 %    MONOCYTES 9 4.0 - 12.0 %    EOSINOPHILS 3 0.5 - 7.8 %    BASOPHILS 0 0.0 - 2.0 %    IMMATURE GRANULOCYTES 0 0.0 - 5.0 %    ABS. NEUTROPHILS 4.8 1.7 - 8.2 K/UL    ABS. LYMPHOCYTES 2.1 0.5 - 4.6 K/UL    ABS. MONOCYTES 0.7 0.1 - 1.3 K/UL    ABS. EOSINOPHILS 0.2 0.0 - 0.8 K/UL    ABS. BASOPHILS 0.0 0.0 - 0.2 K/UL    ABS. IMM.  GRANS. 0.0 0.0 - 0.5 K/UL   METABOLIC PANEL, COMPREHENSIVE    Collection Time: 05/03/18  6:21 AM   Result Value Ref Range    Sodium 141 136 - 145 mmol/L    Potassium 4.5 3.5 - 5.1 mmol/L    Chloride 100 98 - 107 mmol/L    CO2 35 (H) 21 - 32 mmol/L    Anion gap 6 (L) 7 - 16 mmol/L    Glucose 125 (H) 65 - 100 mg/dL    BUN 54 (H) 8 - 23 MG/DL    Creatinine 1.53 (H) 0.6 - 1.0 MG/DL    GFR est AA 43 (L) >60 ml/min/1.73m2    GFR est non-AA 36 (L) >60 ml/min/1.73m2    Calcium 9.6 8.3 - 10.4 MG/DL    Bilirubin, total 0.6 0.2 - 1.1 MG/DL    ALT (SGPT) 19 12 - 65 U/L    AST (SGOT) 16 15 - 37 U/L    Alk.  phosphatase 51 50 - 136 U/L    Protein, total 7.0 6.3 - 8.2 g/dL    Albumin 3.2 3.2 - 4.6 g/dL    Globulin 3.8 (H) 2.3 - 3.5 g/dL    A-G Ratio 0.8 (L) 1.2 - 3.5     PROTHROMBIN TIME + INR    Collection Time: 05/03/18  6:21 AM   Result Value Ref Range    Prothrombin time 28.3 (H) 11.5 - 14.5 sec    INR 2.7     PROTHROMBIN TIME + INR    Collection Time: 05/04/18  5:21 AM   Result Value Ref Range    Prothrombin time 28.0 (H) 11.5 - 14.5 sec    INR 2.7         Assessment:     Problem List as of 5/4/2018  Date Reviewed: 4/27/2018          Codes Class Noted - Resolved    * (Principal)Physical debility ICD-10-CM: R53.81  ICD-9-CM: 799.3  5/2/2018 - Present        Closed nondisplaced fracture of shaft of fifth metacarpal bone of left hand ICD-10-CM: S62.357A  ICD-9-CM: 815.03  4/28/2018 - Present        Subdural hematoma ([facility] ICD-10-CM: I62.00  ICD-9-CM: 432.1  4/27/2018 - Present        Long term (current) use of anticoagulants (Chronic) ICD-10-CM: Z79.01  ICD-9-CM: V58.61  4/19/2018 - Present        Dysthymia ICD-10-CM: F34.1  ICD-9-CM: 300.4  4/5/2018 - Present        Type 2 diabetes mellitus with nephropathy (HCC) (Chronic) ICD-10-CM: E11.21  ICD-9-CM: 250.40, 583.81  12/18/2017 - Present        Pulmonary hypertension (HCC) (Chronic) ICD-10-CM: I27.20  ICD-9-CM: 416.8  6/15/2016 - Present        S/P AVR (aortic valve replacement) (Chronic) ICD-10-CM: Z95.2  ICD-9-CM: V43.3  Unknown - Present    Overview Signed 2/23/2016 11:04 AM by Trevon Haines     Mechanical/Artific             Cardiomyopathy [facility]) (Chronic) ICD-10-CM: I42.9  ICD-9-CM: 425.4  Unknown - Present        Osteopenia ICD-10-CM: M85.80  ICD-9-CM: 733.90  Unknown - Present        HLD (hyperlipidemia) (Chronic) ICD-10-CM: E78.5  ICD-9-CM: 272.4  Unknown - Present        Osteoarthritis ICD-10-CM: M19.90  ICD-9-CM: 715.90  Unknown - Present        ICD (implantable cardioverter-defibrillator) in place ICD-10-CM: Z95.810  ICD-9-CM: V45.02  10/2/2014 - Present    Overview Signed 10/2/2014  4:58 PM by Joann Hand III     Biotronik single-chamber ICD implantation 10/20/14             Diabetes mellitus type 2, diet-controlled ([facility] (Chronic) ICD-10-CM: E11.9  ICD-9-CM: 250.00  8/28/2014 - Present        COPD (chronic obstructive pulmonary disease) ([facility] (Chronic) ICD-10-CM: J44.9  ICD-9-CM: 315  4/2/2014 - Present        REJI (obstructive sleep apnea)-cpap (Chronic) ICD-10-CM: G47.33  ICD-9-CM: 327.23  4/2/2014 - Present        CKD (chronic kidney disease) stage 3, GFR 30-59 ml/min (Chronic) ICD-10-CM: N18.3  ICD-9-CM: 585.3  7/10/2013 - Present        Anticoagulated on Coumadin (Chronic) ICD-10-CM: Z51.81, Z79.01  ICD-9-CM: V58.83, V58.61  7/9/2013 - Present    Overview Signed 7/9/2013  4:16 PM by Camilo Andrade NP     S/P AVR             CAD (coronary artery disease) (Chronic) ICD-10-CM: I25.10  ICD-9-CM: 414.00  1/20/2013 - Present    Overview Signed 5/20/2014 10:05 AM by Mikey Nance NP     5/8/14 PCI LAD with stent placed             Chronic combined systolic and diastolic heart failure (HCC) (Chronic) ICD-10-CM: I50.42  ICD-9-CM: 428.42  1/20/2013 - Present    Overview Addendum 4/28/2018  4:53 AM by Qian Cortes MD     5/8/14 ECHO:  EF 10-15%  12/2017:  EF 25-30%             Essential hypertension, benign (Chronic) ICD-10-CM: I10  ICD-9-CM: 401.1  1/20/2013 - Present MDS (myelodysplastic syndrome) (HCC) (Chronic) ICD-10-CM: D46.9  ICD-9-CM: 238.75  12/17/2011 - Present    Overview Addendum 8/29/2013 11:06 AM by Meeta Reynoso started in August, 2011 12/18/11 Procrit weekly and Iron stores. 5-12 12-13-12 good response to 3 weekly procrit did not need it last time    3-7-13 Pt doing well. Just wanted a \"check-up. \" Responding to Procrit every three weeks. 8-29-13 patient has missed some injections on recently restarted hemoglobin only issue , takes oral iron iron stores each time                Iron deficiency anemia due to chronic blood loss (Chronic) ICD-10-CM: D50.0  ICD-9-CM: 280.0  7/29/2009 - Present        RESOLVED: CHF (congestive heart failure) (HCC) ICD-10-CM: I50.9  ICD-9-CM: 428.0  2/17/2017 - 4/27/2018        RESOLVED: Routine general medical examination at a health care facility ICD-10-CM: Z00.00  ICD-9-CM: V70.0  12/16/2016 - 4/27/2018        RESOLVED: Chest pain, unspecified ICD-10-CM: R07.9  ICD-9-CM: 786.50  12/7/2016 - 12/16/2016        RESOLVED: Chronic left-sided low back pain without sciatica ICD-10-CM: M54.5, G89.29  ICD-9-CM: 724.2, 338.29  6/15/2016 - 4/27/2018        RESOLVED: Tachycardia ICD-10-CM: R00.0  ICD-9-CM: 785.0  Unknown - 12/16/2016    Overview Signed 2/23/2016 11:02 AM by Fawn Marc H/B              RESOLVED: Chronic respiratory failure ([address] (Chronic) ICD-10-CM: J96.10  ICD-9-CM: 518.83  4/2/2014 - 4/27/2018              Plan:         S/p fall with bilateral hand fxs, right knee sprain, FELIZ on CKD, anemia with underlying dx of MDS; physical debility  Plan:       Continue daily physician medical management:  Pneumonia prophylaxis- Incentive spirometer every hour while awake    Digit fxs both hands; cont splint/rachel tape; only restriction is no lifting; has chronic RUE weakness from what I suspect is a RTC injury; xray neg; OT working on modalities      COPD/REJI; pt is on Trilogy at [employer].  She manages this at home but having difficulty do to fxs in her hands  -cont Proventil , Pulmicort; Spiriva;  try weaning daytime O2; currently 4-6L due to hypoxia. Doesn't use during day at home per pt; 5/3 NOW reports chronic 3L at home, thus at baseline  -5/3 down from 4 to 2L , sats 98%; 5/4 on 3L  sats 96% (obviously needs due to drop noted to 65% sats when O2 accidentally not hooked up last noc and pt symptomatic with chest pain which resolved immediately when placed back on O2      Severe CHF/cardiomyopathy; compensated currently; cont Coreg, Lasix; has an ICD ; on entresto as well, 5/3  -5/3 no evidence of acute issues; 5/4 compensated well and considering dec Lasix      DVT risk / DVT Prophylaxis- Will require daily physician exam to assess for signs and symptoms as patient is at increased risk for of thromboembolism. Mobilization as tolerated. Intermittent pneumatic compression devices when in bed Thigh-high or knee-high thromboembolic deterrent hose when out of bed. On therapeutic Coumadin       Pain Control: ongoing significant pain in back, shoulders, knees and hands which is stable and controlled by PRN meds. Will require regular pain assessment and comprenhensive pain management,   -has end stage OA of the knees and now with right knee sprain; ice/ace wrap, modalities      Wound Care: Monitor wound status daily per staff and physician. At risk for failure. Will require 24/7 rehab nursing. None needed but at risk due to immobility   -5/3 hematoma dorsum right hand; warm compress      Hypertension - BP uncontrolled, fluctuating, managed medically.  Has hypotension, asymptomatic, parameters with meds   -5/3 119-141 SBP, controlled; 5/4 /66 -; on Coreg, Lasix and Entresto; CHF well compensated; consider decreasing lasix which is dosed at 80mg bid      MDS/chronic anemia; monitor , replace as needed, consider epogen  -5/3 hgb stable at 8.4; 5/4 consider dose of epogen but I believe there are restrictions to ordering and may need hematology, will increase iron due to deficiency; recheck 5/7      FELIZ on CKD; improving, monitor labs and adjust meds; daily wt due to CHF  -creat slowly improving 5/3; 1.53 from 1.71 yesterday, BUN remains unchanged, 50's; monitor labs 3x/wk      Chronic coumadin therapy due to mech AVR; goal INR 2-3; inr 2.9; monitor and have pharmacy assist in dosing;   5/4 INR therap at 2.7 on home alternating doses of coumadin (2.5mg M/F and 5mg S/Tue/W/TH/SAT)     ? Hx of DM listed but on no medications ; do have on diet restrictions; carbohydrate restrictions      Urinary retention/ neurogenic bladder - schedule voids q6-8 hrs. Check post-void residual as needed; In and Out catheterize if post-void residual is more than 400 cc.  -5/4 no issues      bowel program - constipation, added bowel program; 5/4 continent; no const/diarr      GERD - resume PPI. At times may need additional antacids, Maalox prn. Time spent was 35 minutes with over 1/2 in direct patient care/examination, consultation and coordination of care.      Signed By: Sarah Anderson MD     May 4, 2018

## 2018-05-05 LAB
INR PPP: 3
PROTHROMBIN TIME: 30.9 SEC (ref 11.5–14.5)

## 2018-05-05 PROCEDURE — 74011000250 HC RX REV CODE- 250: Performed by: PHYSICAL MEDICINE & REHABILITATION

## 2018-05-05 PROCEDURE — 94760 N-INVAS EAR/PLS OXIMETRY 1: CPT

## 2018-05-05 PROCEDURE — 36415 COLL VENOUS BLD VENIPUNCTURE: CPT | Performed by: PHYSICAL MEDICINE & REHABILITATION

## 2018-05-05 PROCEDURE — 97116 GAIT TRAINING THERAPY: CPT

## 2018-05-05 PROCEDURE — 74011250637 HC RX REV CODE- 250/637: Performed by: PHYSICAL MEDICINE & REHABILITATION

## 2018-05-05 PROCEDURE — 94640 AIRWAY INHALATION TREATMENT: CPT

## 2018-05-05 PROCEDURE — 97110 THERAPEUTIC EXERCISES: CPT

## 2018-05-05 PROCEDURE — 65310000000 HC RM PRIVATE REHAB

## 2018-05-05 PROCEDURE — 85610 PROTHROMBIN TIME: CPT | Performed by: PHYSICAL MEDICINE & REHABILITATION

## 2018-05-05 PROCEDURE — 97530 THERAPEUTIC ACTIVITIES: CPT

## 2018-05-05 PROCEDURE — 77010033678 HC OXYGEN DAILY

## 2018-05-05 PROCEDURE — 99232 SBSQ HOSP IP/OBS MODERATE 35: CPT | Performed by: PHYSICAL MEDICINE & REHABILITATION

## 2018-05-05 PROCEDURE — 97535 SELF CARE MNGMENT TRAINING: CPT

## 2018-05-05 RX ADMIN — LORAZEPAM 1 MG: 1 TABLET ORAL at 21:21

## 2018-05-05 RX ADMIN — POLYETHYLENE GLYCOL (3350) 17 G: 17 POWDER, FOR SOLUTION ORAL at 09:00

## 2018-05-05 RX ADMIN — ALBUTEROL SULFATE 2.5 MG: 2.5 SOLUTION RESPIRATORY (INHALATION) at 05:16

## 2018-05-05 RX ADMIN — SACUBITRIL AND VALSARTAN 1 TABLET: 24; 26 TABLET, FILM COATED ORAL at 09:01

## 2018-05-05 RX ADMIN — OXYCODONE HYDROCHLORIDE 10 MG: 5 TABLET ORAL at 13:13

## 2018-05-05 RX ADMIN — ESCITALOPRAM 10 MG: 10 TABLET, FILM COATED ORAL at 09:01

## 2018-05-05 RX ADMIN — Medication 10 ML: at 13:15

## 2018-05-05 RX ADMIN — Medication 5 ML: at 05:35

## 2018-05-05 RX ADMIN — FERROUS SULFATE TAB 325 MG (65 MG ELEMENTAL FE) 325 MG: 325 (65 FE) TAB at 09:01

## 2018-05-05 RX ADMIN — TRAMADOL HYDROCHLORIDE 50 MG: 50 TABLET, FILM COATED ORAL at 21:21

## 2018-05-05 RX ADMIN — SENNOSIDES 8.6 MG: 8.6 TABLET, FILM COATED ORAL at 09:01

## 2018-05-05 RX ADMIN — CARVEDILOL 3.12 MG: 3.12 TABLET, FILM COATED ORAL at 17:41

## 2018-05-05 RX ADMIN — OXYCODONE HYDROCHLORIDE 10 MG: 5 TABLET ORAL at 02:58

## 2018-05-05 RX ADMIN — SACUBITRIL AND VALSARTAN 1 TABLET: 24; 26 TABLET, FILM COATED ORAL at 17:42

## 2018-05-05 RX ADMIN — FUROSEMIDE 80 MG: 40 TABLET ORAL at 17:41

## 2018-05-05 RX ADMIN — SIMVASTATIN 20 MG: 20 TABLET, FILM COATED ORAL at 21:21

## 2018-05-05 RX ADMIN — OXYCODONE HYDROCHLORIDE 10 MG: 5 TABLET ORAL at 17:42

## 2018-05-05 RX ADMIN — WARFARIN SODIUM 5 MG: 2.5 TABLET ORAL at 17:42

## 2018-05-05 RX ADMIN — ALBUTEROL SULFATE 2.5 MG: 2.5 SOLUTION RESPIRATORY (INHALATION) at 18:04

## 2018-05-05 RX ADMIN — Medication 10 ML: at 22:00

## 2018-05-05 RX ADMIN — TIOTROPIUM BROMIDE 18 MCG: 18 CAPSULE ORAL; RESPIRATORY (INHALATION) at 05:16

## 2018-05-05 RX ADMIN — BUDESONIDE 500 MCG: 0.5 INHALANT RESPIRATORY (INHALATION) at 18:04

## 2018-05-05 RX ADMIN — FERROUS SULFATE TAB 325 MG (65 MG ELEMENTAL FE) 325 MG: 325 (65 FE) TAB at 17:42

## 2018-05-05 RX ADMIN — BUDESONIDE 500 MCG: 0.5 INHALANT RESPIRATORY (INHALATION) at 05:16

## 2018-05-05 RX ADMIN — TRAMADOL HYDROCHLORIDE 50 MG: 50 TABLET, FILM COATED ORAL at 05:40

## 2018-05-05 RX ADMIN — CARVEDILOL 3.12 MG: 3.12 TABLET, FILM COATED ORAL at 09:02

## 2018-05-05 RX ADMIN — ALBUTEROL SULFATE 2.5 MG: 2.5 SOLUTION RESPIRATORY (INHALATION) at 11:15

## 2018-05-05 RX ADMIN — FUROSEMIDE 80 MG: 40 TABLET ORAL at 09:01

## 2018-05-05 NOTE — PROGRESS NOTES
Problem: Falls - Risk of  Goal: *Absence of Falls  Document Tia Fall Risk and appropriate interventions in the flowsheet.    Outcome: Progressing Towards Goal  Fall Risk Interventions:  Mobility Interventions: Patient to call before getting OOB, Utilize walker, cane, or other assitive device         Medication Interventions: Evaluate medications/consider consulting pharmacy, Patient to call before getting OOB    Elimination Interventions: Call light in reach, Patient to call for help with toileting needs    History of Falls Interventions: Consult care management for discharge planning

## 2018-05-05 NOTE — PROGRESS NOTES
Problem: Falls - Risk of  Goal: *Absence of Falls  Document Tia Fall Risk and appropriate interventions in the flowsheet.    Outcome: Progressing Towards Goal  Fall Risk Interventions:  Mobility Interventions: Patient to call before getting OOB         Medication Interventions: Patient to call before getting OOB, Teach patient to arise slowly    Elimination Interventions: Call light in reach, Patient to call for help with toileting needs, Toilet paper/wipes in reach, Toileting schedule/hourly rounds    History of Falls Interventions: Door open when patient unattended

## 2018-05-05 NOTE — PROGRESS NOTES
End Of Shift Functional Summary, Nursing      TOILETING:    Does patient need assist with adjusting pants up or down and/or pericare? yes  If yes, please indicate what the patient needs help with:  Pants up and down, pericare   Does the patient require extra time? yes  Does the patient require standby assistance? yes  Does the patient require contact guard assistance? yes  Does the patient require more than one staff member for assistance? no    TOILET TRANSFER:    Pt requires minimal assistance. Pt uses raised toilet seat. Does the patient require extra time? yes  Does the patient require contact guard assistance? yes  Does the patient require more than one staff member for assistance? yes    BLADDER:    Pt does not have a medina catheter that staff manages. Pt does not take medication. If so, please indicate which medication. Pt is continent. of bladder and voids in toilet. Pt has had 0 bladder accidents during this shift. BOWEL:  Pt does take medication. Pt is continent of bowel and uses toilet. Pt has had 0 bowel accidents during this shift. BED/CHAIR TRANSFER  Pt requires minimal assistance. Does the patient require extra time? yes  Does the patient require contact guard assistance? yes  Does the patient require more than one staff member for assistance? no      EATING  Pt requires setup. TUBE FEEDINGS:  Pt does not  receive nutrition through tube feedings. Documentation reviewed and plan of care discussed/reviewed with   patient, physician, therapists, oncoming nurse and patient assistant during the shift.

## 2018-05-05 NOTE — PROGRESS NOTES
PHYSICAL THERAPY DAILY NOTE  Time In: 7601  Time Out: 1030  Patient Seen For: AM;Balance activities;Gait training;Patient education; Therapeutic exercise;Transfer training; Other (see progress notes)    Subjective: patient reporting she wants to walk. Reports earlier this AM she was having shortness of breath , increase in left arm pain and anxiety. Reports she feels better now and she was checked out by RN and MD         Objective:Vital Signs: 02 sat 87 to 89 % after ambulating 60ft on 02 at 3 lpm. 02 sat 91 to 94% at rest.  Patient Vitals for the past 12 hrs:   Temp Pulse Resp BP SpO2   05/05/18 1115 - - - - 95 %   05/05/18 0746 98 °F (36.7 °C) 78 19 141/60 94 %   05/05/18 0516 - - - - 96 %     Pain level:  Pain location:  Pain interventions:    Patient education:I    Interdisciplinary Communication:spoke with RN and MD regarding patient's statement above    Other (comment) (fall precautions, NWB Left hand)  GROSS ASSESSMENT Daily Assessment     NA       BED/MAT MOBILITY Daily Assessment    Rolling Right : 0 (Not tested)  Rolling Left : 0 (Not tested)  Supine to Sit : 0 (Not tested)  Sit to Supine : 0 (Not tested)       TRANSFERS Daily Assessment   Increased time and effort to complete with cues for body mechanics   Transfer Type: SPT without device  Transfer Assistance : 5 (Stand-by assistance)  Sit to Stand Assistance: Stand-by assistance  Car Transfers: Not tested       GAIT Daily Assessment    Amount of Assistance: 5 (Supervision/setup)  Distance (ft): 60 Feet (ft) (60ft x 4 with 3 to 4 min rest breaks)  Assistive Device:  (No device)   Gait with cues for managing 02 line and for breathing pattern.     STEPS or STAIRS Daily Assessment    Steps/Stairs Ambulated (#): 0  Level of Assist : 0 (Not tested)       BALANCE Daily Assessment   Static standing balance without a device and SBA to supervision Sitting - Static: Good (unsupported)  Sitting - Dynamic: Good (unsupported)  Standing - Static: Good  Standing - Dynamic : Impaired       WHEELCHAIR MOBILITY Daily Assessment    Curbs/Ramps Assist Required (FIM Score): 0 (Not tested)  Wheelchair Setup Assist Required : 0 (Not tested)       LOWER EXTREMITY EXERCISES Daily Assessment    Extremity: Both  Exercise Type #1: Other (comment) (LE motomed x 10 mins at level 2)  Level of Assist: Supervision          Assessment: Patient progressing towards modified independence with transfers and gait. Cont to demonstrate altered body mechanics with sit to stand due to LE weakness and inability to utilize UEs to assist with sit to stand       Rehab tech assisted patient back to room    Plan of Care: Continue with POC and progress as tolerated.      Sae Pool, PT  5/5/2018

## 2018-05-05 NOTE — PROGRESS NOTES
05/05/18 1336   Time Spent With Patient   Time In 0910   Time Out 0950   Patient Seen For: AM;ADLs;Other (see progress notes)   Upper Body Dressing    Dressing Assistance  Dep   Lower Body Dressing    Dressing Assistance  Max A   Position Performed Seated edge of bed;Bending forward method   Patient seen for Dressing and UE ROM in RUE. Supine to sit with min A. Transfer to chair min A. Doff/don shirt Dependent due to pain and decreased ROM in RUE. Attempted to thread legs. Assisted with last part of threading. In gym, completed scapular mobility and RUE ROM. Biofreeze applied. Handoff to PT. Continue POC.      Ranjit Krishnan  5/5/2018

## 2018-05-05 NOTE — PROGRESS NOTES
Warfarin dosing per pharmacist    Ney Gordon Erin Andrade is a 77 y.o. female. Height: 5' 2\" (157.5 cm)    Weight: 101.2 kg (223 lb)    Indication:  Mechanical aortic valve replacement    Goal INR:  2-3    Home dose:  2.5 mg Mon, Fri; 5 mg all other days    Risk factors or significant drug interactions:  none    Other anticoagulants:  none    Daily Monitoring  Date  INR     Warfarin dose HGB              Notes  4/28  2.1          5 mg + 2.5 mg 7.4  4/29  2.4  5 mg  7  4/30  2.6  2.5 mg  8  5/1  2.9  5 mg  7.9  5/2  2.9  5 mg  8.5  Pharmacy consulted  5/3  2.7  5 mg  8.4  5/4  2.7  2.5 mg  ---   5/5  3  5 mg  ---     Pharmacy consulted to dose warfarin for Ms. Corral. She has been transferred to inpatient rehab following hospital stay for complications following a fall. She is followed by [address] [place] [address] [address] [facility] at the anti-coag clinic and was therapeutic on her home dose at her last visit on 4/19/2018. INR up to 3 today. Received 2.5 mg dose yesterday so do not expect further increase tomorrow. Will continue with current dose of 5 mg tonight. Daily INR for adjustments. Will need to decrease dose tomorrow if INR increases. Pharmacy will continue to follow. Please call with any questions.     Thank you,  Melissa Recinos, PharmD  Clinical Pharmacist  [phone]

## 2018-05-05 NOTE — PROGRESS NOTES
Denilson Alexander MD,   Medical Director  [address] [facility], [address] [facility]  Tel: [phone]       [facility] PROGRESS NOTE    Giovana Miller  Admit Date: 5/2/2018  Admit Diagnosis: Debillity ; Physical debility    Subjective     Pain in hands and knees, especially the right knee. Mobility is increasing and is able to use hands more in functional activities.  In good spirits. + BM; denies SOB on home O2 3L    Objective:     Current Facility-Administered Medications   Medication Dose Route Frequency    ferrous sulfate tablet 325 mg  1 Tab Oral BID WITH MEALS    0.9% sodium chloride infusion 250 mL  250 mL IntraVENous PRN    0.9% sodium chloride infusion 250 mL  250 mL IntraVENous PRN    acetaminophen (TYLENOL) tablet 650 mg  650 mg Oral Q4H PRN    bisacodyl (DULCOLAX) tablet 5 mg  5 mg Oral DAILY PRN    diphenhydrAMINE (BENADRYL) capsule 25 mg  25 mg Oral Q4H PRN    LORazepam (ATIVAN) tablet 1 mg  1 mg Oral BID PRN    naloxone (NARCAN) injection 0.4 mg  0.4 mg IntraVENous PRN    prochlorperazine (COMPAZINE) injection 5 mg  5 mg IntraVENous Q8H PRN    sodium chloride (NS) flush 5-10 mL  5-10 mL IntraVENous Q8H    sodium chloride (NS) flush 5-10 mL  5-10 mL IntraVENous PRN    albuterol (PROVENTIL VENTOLIN) nebulizer solution 2.5 mg  2.5 mg Nebulization Q4H PRN    bisacodyl (DULCOLAX) suppository 10 mg  10 mg Rectal DAILY PRN    budesonide (PULMICORT) 500 mcg/2 ml nebulizer suspension  500 mcg Nebulization BID RT    And    albuterol (PROVENTIL VENTOLIN) nebulizer solution 2.5 mg  2.5 mg Nebulization Q6HWA RT    carvedilol (COREG) tablet 3.125 mg  3.125 mg Oral BID WITH MEALS    escitalopram oxalate (LEXAPRO) tablet 10 mg  10 mg Oral DAILY    furosemide (LASIX) tablet 80 mg  80 mg Oral BID    loratadine (CLARITIN) tablet 10 mg  10 mg Oral DAILY PRN    oxyCODONE IR (ROXICODONE) tablet 10 mg  10 mg Oral Q4H PRN    polyethylene glycol (MIRALAX) packet 17 g 17 g Oral DAILY    sacubitril-valsartan (ENTRESTO) 24-26 mg tablet 1 Tab  1 Tab Oral BID    senna (SENOKOT) tablet 8.6 mg  1 Tab Oral DAILY    simvastatin (ZOCOR) tablet 20 mg  20 mg Oral QHS    tiotropium (SPIRIVA) inhalation capsule 18 mcg  1 Cap Inhalation DAILY    traMADol (ULTRAM) tablet 50 mg  50 mg Oral Q6H PRN    warfarin (COUMADIN) tablet 2.5 mg  2.5 mg Oral Once per day on Mon Fri    warfarin (COUMADIN) tablet 5 mg  5 mg Oral Once per day on Sun Tue Wed Thu Sat     Review of Systems:Denies chest pain, shortness of breath, cough, headache,abdominal pain, dysurea, calf pain. Pertinent positives are as noted in the medical records and unremarkable otherwise.   + blurred vision on left  Visit Vitals    /60 (BP 1 Location: Right arm, BP Patient Position: Sitting)    Pulse 78    Temp 98 °F (36.7 °C)    Resp 19    Ht 5' 2\" (1.575 m)    Wt 223 lb (101.2 kg)    SpO2 94%    Breastfeeding No    BMI 40.79 kg/m2        Physical Exam:   General: Alert and age appropriately oriented. No acute cardio respiratory distress. HEENT: Normocephalic,no scleral icterus; echmm around left eye, scleral hemorrhage unchanged; hematoma palpated under left zygomatic arch, tender  Oral mucosa moist without cyanosis   Lungs: Clear to auscultation  bilaterally. Respiration even and unlabored   Heart: Regular rate and rhythm, S1, S2   No  murmurs, clicks, rub or gallops   Abdomen: Soft, non-tender, nondistended. Bowel sounds present. No organomegaly. obese   Genitourinary: defer   Neuromuscular:      NT pt with PT; gait steady, able to use left hand to take off and put on glasses. Skin/extremity: No rashes, no erythema.  No calf tenderness BLE  Splint /taping L hand, rachel tape digits 4 and 5 on right; dorsum of hand less swollen  Trace edema; pain with wtbearing on right knee; contusion                                                                            Functional Assessment:          Balance  Sitting - Static: Good (unsupported) (05/04/18 1600)  Sitting - Dynamic: Good (unsupported) (05/04/18 1600)  Standing - Static: Fair (05/04/18 1600)  Standing - Dynamic : Impaired (05/04/18 1600)       Feeding  Adaptive Equipment: Non-skid surface;Built up spoon;Built up fork;Plate guard (06/78/63 1028)             [place] Fall Risk Assessment:  [place] Fall Risk  Mobility: Ambulates or transfers with assist devices or assistance (05/05/18 0830)  Mobility Interventions: Patient to call before getting OOB (05/05/18 0216)  Mentation: Alert, oriented x 3 (05/05/18 0216)  Medication: Patient receiving anticonvulsants, sedatives(tranquilizers), psychotropics or hypnotics, hypoglycemics, narcotics, sleep aids, antihypertensives, laxatives, or diuretics (05/05/18 0216)  Medication Interventions: Assess postural VS orthostatic hypotension; Evaluate medications/consider consulting pharmacy; Patient to call before getting OOB;Utilize gait belt for transfers/ambulation (05/05/18 0216)  Elimination: Needs assistance with toileting (05/05/18 0216)  Elimination Interventions: Call light in reach; Patient to call for help with toileting needs (05/05/18 0216)  Prior Fall History: Before admission in past 12 months _home or previous inpatient care) (05/05/18 0216)  History of Falls Interventions: Consult care management for discharge planning (05/05/18 0216)  Total Score: 4 (05/05/18 0216)  Standard Fall Precautions: Yes (05/03/18 2239)  High Fall Risk: Yes (05/05/18 0216)     Speech Assessment:         Ambulation:  Gait  Distance (ft): 60 Feet (ft) (60ft x 2 outside on concrete surfaces) (05/04/18 1600)  Assistive Device:  (no device) (05/04/18 1600)     Labs/Studies:  Recent Results (from the past 72 hour(s))   CBC WITH AUTOMATED DIFF    Collection Time: 05/03/18  6:21 AM   Result Value Ref Range    WBC 7.9 4.3 - 11.1 K/uL    RBC 2.86 (L) 4.05 - 5.25 M/uL    HGB 8.4 (L) 11.7 - 15.4 g/dL    HCT 26.4 (L) 35.8 - 46.3 %    MCV 92.3 79.6 - 97.8 FL MCH 29.4 26.1 - 32.9 PG    MCHC 31.8 31.4 - 35.0 g/dL    RDW 14.2 11.9 - 14.6 %    PLATELET 697 951 - 020 K/uL    MPV 10.3 (L) 10.8 - 14.1 FL    DF AUTOMATED      NEUTROPHILS 62 43 - 78 %    LYMPHOCYTES 26 13 - 44 %    MONOCYTES 9 4.0 - 12.0 %    EOSINOPHILS 3 0.5 - 7.8 %    BASOPHILS 0 0.0 - 2.0 %    IMMATURE GRANULOCYTES 0 0.0 - 5.0 %    ABS. NEUTROPHILS 4.8 1.7 - 8.2 K/UL    ABS. LYMPHOCYTES 2.1 0.5 - 4.6 K/UL    ABS. MONOCYTES 0.7 0.1 - 1.3 K/UL    ABS. EOSINOPHILS 0.2 0.0 - 0.8 K/UL    ABS. BASOPHILS 0.0 0.0 - 0.2 K/UL    ABS. IMM. GRANS. 0.0 0.0 - 0.5 K/UL   METABOLIC PANEL, COMPREHENSIVE    Collection Time: 05/03/18  6:21 AM   Result Value Ref Range    Sodium 141 136 - 145 mmol/L    Potassium 4.5 3.5 - 5.1 mmol/L    Chloride 100 98 - 107 mmol/L    CO2 35 (H) 21 - 32 mmol/L    Anion gap 6 (L) 7 - 16 mmol/L    Glucose 125 (H) 65 - 100 mg/dL    BUN 54 (H) 8 - 23 MG/DL    Creatinine 1.53 (H) 0.6 - 1.0 MG/DL    GFR est AA 43 (L) >60 ml/min/1.73m2    GFR est non-AA 36 (L) >60 ml/min/1.73m2    Calcium 9.6 8.3 - 10.4 MG/DL    Bilirubin, total 0.6 0.2 - 1.1 MG/DL    ALT (SGPT) 19 12 - 65 U/L    AST (SGOT) 16 15 - 37 U/L    Alk.  phosphatase 51 50 - 136 U/L    Protein, total 7.0 6.3 - 8.2 g/dL    Albumin 3.2 3.2 - 4.6 g/dL    Globulin 3.8 (H) 2.3 - 3.5 g/dL    A-G Ratio 0.8 (L) 1.2 - 3.5     PROTHROMBIN TIME + INR    Collection Time: 05/03/18  6:21 AM   Result Value Ref Range    Prothrombin time 28.3 (H) 11.5 - 14.5 sec    INR 2.7     PROTHROMBIN TIME + INR    Collection Time: 05/04/18  5:21 AM   Result Value Ref Range    Prothrombin time 28.0 (H) 11.5 - 14.5 sec    INR 2.7     PROTHROMBIN TIME + INR    Collection Time: 05/05/18  6:44 AM   Result Value Ref Range    Prothrombin time 30.9 (H) 11.5 - 14.5 sec    INR 3.0         Assessment:     Problem List as of 5/5/2018  Date Reviewed: 4/27/2018          Codes Class Noted - Resolved    * (Principal)Physical debility ICD-10-CM: R53.81  ICD-9-CM: 799.3  5/2/2018 - Present Closed nondisplaced fracture of shaft of fifth metacarpal bone of left hand ICD-10-CM: S62.357A  ICD-9-CM: 815.03  4/28/2018 - Present        Subdural hematoma (HCC) ICD-10-CM: I62.00  ICD-9-CM: 432.1  4/27/2018 - Present        Long term (current) use of anticoagulants (Chronic) ICD-10-CM: Z79.01  ICD-9-CM: V58.61  4/19/2018 - Present        Dysthymia ICD-10-CM: F34.1  ICD-9-CM: 300.4  4/5/2018 - Present        Type 2 diabetes mellitus with nephropathy (HCC) (Chronic) ICD-10-CM: E11.21  ICD-9-CM: 250.40, 583.81  12/18/2017 - Present        Pulmonary hypertension (HCC) (Chronic) ICD-10-CM: I27.20  ICD-9-CM: 416.8  6/15/2016 - Present        S/P AVR (aortic valve replacement) (Chronic) ICD-10-CM: Z95.2  ICD-9-CM: V43.3  Unknown - Present    Overview Signed 2/23/2016 11:04 AM by Emilie Banda     Mechanical/Artific             Cardiomyopathy ([facility] [address] (Chronic) ICD-10-CM: I42.9  ICD-9-CM: 425.4  Unknown - Present        Osteopenia ICD-10-CM: M85.80  ICD-9-CM: 733.90  Unknown - Present        HLD (hyperlipidemia) (Chronic) ICD-10-CM: E78.5  ICD-9-CM: 272.4  Unknown - Present        Osteoarthritis ICD-10-CM: M19.90  ICD-9-CM: 715.90  Unknown - Present        ICD (implantable cardioverter-defibrillator) in place ICD-10-CM: Z95.810  ICD-9-CM: V45.02  10/2/2014 - Present    Overview Signed 10/2/2014  4:58 PM by Sejal Swan single-chamber ICD implantation 10/20/14             Diabetes mellitus type 2, diet-controlled ([facility] [address] (Chronic) ICD-10-CM: E11.9  ICD-9-CM: 250.00  8/28/2014 - Present        COPD (chronic obstructive pulmonary disease) ([facility] [address] (Chronic) ICD-10-CM: J44.9  ICD-9-CM: 770  4/2/2014 - Present        REJI (obstructive sleep apnea)-cpap (Chronic) ICD-10-CM: G47.33  ICD-9-CM: 327.23  4/2/2014 - Present        CKD (chronic kidney disease) stage 3, GFR 30-59 ml/min (Chronic) ICD-10-CM: N18.3  ICD-9-CM: 585.3  7/10/2013 - Present        Anticoagulated on Coumadin (Chronic) ICD-10-CM: Z51.81, Z79.01  ICD-9-CM: V58.83, V58.61  7/9/2013 - Present    Overview Signed 7/9/2013  4:16 PM by Dasia Li NP     S/P AVR             CAD (coronary artery disease) (Chronic) ICD-10-CM: I25.10  ICD-9-CM: 414.00  1/20/2013 - Present    Overview Signed 5/20/2014 10:05 AM by Lizzie Morales NP     5/8/14 PCI LAD with stent placed             Chronic combined systolic and diastolic heart failure (HCC) (Chronic) ICD-10-CM: I50.42  ICD-9-CM: 428.42  1/20/2013 - Present    Overview Addendum 4/28/2018  4:53 AM by Braulio Carrera MD     5/8/14 ECHO:  EF 10-15%  12/2017:  EF 25-30%             Essential hypertension, benign (Chronic) ICD-10-CM: I10  ICD-9-CM: 401.1  1/20/2013 - Present        MDS (myelodysplastic syndrome) (HCC) (Chronic) ICD-10-CM: D46.9  ICD-9-CM: 238.75  12/17/2011 - Present    Overview Addendum 8/29/2013 11:06 AM by Jb Barrios started in August, 2011 12/18/11 Procrit weekly and Iron stores. 5-12 12-13-12 good response to 3 weekly procrit did not need it last time    3-7-13 Pt doing well. Just wanted a \"check-up. \" Responding to Procrit every three weeks.   8-29-13 patient has missed some injections on recently restarted hemoglobin only issue , takes oral iron iron stores each time                Iron deficiency anemia due to chronic blood loss (Chronic) ICD-10-CM: D50.0  ICD-9-CM: 280.0  7/29/2009 - Present        RESOLVED: CHF (congestive heart failure) ([facility] ICD-10-CM: I50.9  ICD-9-CM: 428.0  2/17/2017 - 4/27/2018        RESOLVED: Routine general medical examination at a health care facility ICD-10-CM: Z00.00  ICD-9-CM: V70.0  12/16/2016 - 4/27/2018        RESOLVED: Chest pain, unspecified ICD-10-CM: R07.9  ICD-9-CM: 786.50  12/7/2016 - 12/16/2016        RESOLVED: Chronic left-sided low back pain without sciatica ICD-10-CM: M54.5, G89.29  ICD-9-CM: 724.2, 338.29  6/15/2016 - 4/27/2018        RESOLVED: Tachycardia ICD-10-CM: R00.0  ICD-9-CM: 785.0  Unknown - 12/16/2016 Overview Signed 2/23/2016 11:02 AM by Dulce Marc H/B              RESOLVED: Chronic respiratory failure (HCC) (Chronic) ICD-10-CM: J96.10  ICD-9-CM: 518.83  4/2/2014 - 4/27/2018              Plan:   S/p fall with bilateral hand fxs, right knee sprain, FELIZ on CKD, anemia with underlying dx of MDS; physical debility     Continue daily physician medical management:  Pneumonia prophylaxis- Incentive spirometer every hour while awake     Digit fxs both hands; cont splint/rachel tape; only restriction is no lifting; has chronic RUE weakness from what I suspect is a RTC injury; xray neg; OT working on modalities      COPD/REJI; pt is on Trilogy at [facility]. She manages this at home but having difficulty do to fxs in her hands  -cont Proventil , Pulmicort; Spiriva;  try weaning daytime O2; currently 4-6L due to hypoxia. Doesn't use during day at home per pt; 5/3 NOW reports chronic 3L at home, thus at baseline  -5/3 down from 4 to 2L , sats 98%; 5/4 on 3L  sats 96% (obviously needs due to drop noted to 65% sats when O2 accidentally not hooked up last noc and pt symptomatic with chest pain which resolved immediately when placed back on O2  -5/5 comfortable. No PRUETT, on home 3L NC, lowest sat 91%      Severe CHF/cardiomyopathy; compensated currently; cont Coreg, Lasix; has an ICD ; on entresto as well, 5/3  -5/3 no evidence of acute issues; 5/4 compensated well and considering dec Lasix  -5/5 recheck renal fx, may dec lasix      DVT risk / DVT Prophylaxis- Will require daily physician exam to assess for signs and symptoms as patient is at increased risk for of thromboembolism. Mobilization as tolerated. Intermittent pneumatic compression devices when in bed Thigh-high or knee-high thromboembolic deterrent hose when out of bed. On therapeutic Coumadin       Pain Control: ongoing significant pain in back, shoulders, knees and hands which is stable and controlled by PRN meds.  Will require regular pain assessment and comprenhensive pain management,   -has end stage OA of the knees and now with right knee sprain; ice/ace wrap, modalities      Wound Care: Monitor wound status daily per staff and physician. At risk for failure. Will require 24/7 rehab nursing. None needed but at risk due to immobility   -5/3 hematoma dorsum right hand; warm compress      Hypertension - BP uncontrolled, fluctuating, managed medically. Has hypotension, asymptomatic, parameters with meds   -5/3 119-141 SBP, controlled; 5/4 /66 -; on Coreg, Lasix and Entresto; CHF well compensated; consider decreasing lasix which is dosed at 80mg bid  5/5 bp 141/60    Scleral hemorrhage; clinically unchanged but less periocular swelling      MDS/chronic anemia; monitor , replace as needed, consider epogen  -5/3 hgb stable at 8.4; 5/4 consider dose of epogen but I believe there are restrictions to ordering and may need hematology, will increase iron due to deficiency; recheck 5/7      FELIZ on CKD; improving, monitor labs and adjust meds; daily wt due to CHF  -creat slowly improving 5/3; 1.53 from 1.71 yesterday, BUN remains unchanged, 50's; monitor labs 3x/wk      Chronic coumadin therapy due to mech AVR; goal INR 2-3; inr 2.9; monitor and have pharmacy assist in dosing;   5/4 INR therap at 2.7 on home alternating doses of coumadin (2.5mg M/F and 5mg S/Tue/W/TH/SAT)      ? Hx of DM listed but on no medications ; do have on diet restrictions; carbohydrate restrictions      Urinary retention/ neurogenic bladder - schedule voids q6-8 hrs. Check post-void residual as needed; In and Out catheterize if post-void residual is more than 400 cc.  -5/4 no issues      bowel program - constipation, added bowel program; 5/4 continent; no const/diarr      GERD - resume PPI. At times may need additional antacids, Maalox prn. Time spent was 25 minutes with over 1/2 in direct patient care/examination, consultation and coordination of care.      Signed By: Christel Martinez Jeremiah Guzman MD     May 5, 2018

## 2018-05-05 NOTE — PROGRESS NOTES
End Of Shift Functional Summary, Nursing      TOILETING:    Does patient need assist with adjusting pants up or down and/or pericare? yes  If yes, please indicate what the patient needs help with:  pants up pants down  Does the patient require extra time? yes  Does the patient require standby assistance? no  Does the patient require contact guard assistance? yes  Does the patient require more than one staff member for assistance? No    TOILET TRANSFER:    Pt requires minimal assistance. Pt uses none. Does the patient require extra time? yes  Does the patient require contact guard assistance? yes  Does the patient require more than one staff member for assistance? no    BLADDER:    Pt does not have a medina catheter that staff manages. Pt does not take medication. Pt is continent. of bladder and voids in toilet  Pt requires staff to position device Pt has had 0 bladder accidents during this shift requiring standby assistance/setup to clean up. (An accident is when the episode is not contained in a brief AND/OR the clothing/linen requires changing/cleaning up.)  Pt has not had a bowel movement this shift. Plan of care discussed with oncoming nurse for the next shift.

## 2018-05-06 LAB
HCT VFR BLD AUTO: 25.9 % (ref 35.8–46.3)
HGB BLD-MCNC: 8.1 G/DL (ref 11.7–15.4)
INR PPP: 2.8
PROTHROMBIN TIME: 29.2 SEC (ref 11.5–14.5)

## 2018-05-06 PROCEDURE — 85018 HEMOGLOBIN: CPT | Performed by: PHYSICAL MEDICINE & REHABILITATION

## 2018-05-06 PROCEDURE — 65310000000 HC RM PRIVATE REHAB

## 2018-05-06 PROCEDURE — 74011000250 HC RX REV CODE- 250: Performed by: PHYSICAL MEDICINE & REHABILITATION

## 2018-05-06 PROCEDURE — 94640 AIRWAY INHALATION TREATMENT: CPT

## 2018-05-06 PROCEDURE — 99232 SBSQ HOSP IP/OBS MODERATE 35: CPT | Performed by: PHYSICAL MEDICINE & REHABILITATION

## 2018-05-06 PROCEDURE — 77010033678 HC OXYGEN DAILY

## 2018-05-06 PROCEDURE — 94760 N-INVAS EAR/PLS OXIMETRY 1: CPT

## 2018-05-06 PROCEDURE — 74011250636 HC RX REV CODE- 250/636: Performed by: PHYSICAL MEDICINE & REHABILITATION

## 2018-05-06 PROCEDURE — 85610 PROTHROMBIN TIME: CPT | Performed by: PHYSICAL MEDICINE & REHABILITATION

## 2018-05-06 PROCEDURE — 74011250637 HC RX REV CODE- 250/637: Performed by: PHYSICAL MEDICINE & REHABILITATION

## 2018-05-06 PROCEDURE — 36415 COLL VENOUS BLD VENIPUNCTURE: CPT | Performed by: PHYSICAL MEDICINE & REHABILITATION

## 2018-05-06 RX ADMIN — SENNOSIDES 8.6 MG: 8.6 TABLET, FILM COATED ORAL at 09:15

## 2018-05-06 RX ADMIN — OXYCODONE HYDROCHLORIDE 10 MG: 5 TABLET ORAL at 17:21

## 2018-05-06 RX ADMIN — ALBUTEROL SULFATE 2.5 MG: 2.5 SOLUTION RESPIRATORY (INHALATION) at 06:07

## 2018-05-06 RX ADMIN — SIMVASTATIN 20 MG: 20 TABLET, FILM COATED ORAL at 21:50

## 2018-05-06 RX ADMIN — TIOTROPIUM BROMIDE 18 MCG: 18 CAPSULE ORAL; RESPIRATORY (INHALATION) at 06:07

## 2018-05-06 RX ADMIN — CARVEDILOL 3.12 MG: 3.12 TABLET, FILM COATED ORAL at 17:21

## 2018-05-06 RX ADMIN — CARVEDILOL 3.12 MG: 3.12 TABLET, FILM COATED ORAL at 09:15

## 2018-05-06 RX ADMIN — FERROUS SULFATE TAB 325 MG (65 MG ELEMENTAL FE) 325 MG: 325 (65 FE) TAB at 17:21

## 2018-05-06 RX ADMIN — SACUBITRIL AND VALSARTAN 1 TABLET: 24; 26 TABLET, FILM COATED ORAL at 17:21

## 2018-05-06 RX ADMIN — OXYCODONE HYDROCHLORIDE 10 MG: 5 TABLET ORAL at 10:50

## 2018-05-06 RX ADMIN — BUDESONIDE 500 MCG: 0.5 INHALANT RESPIRATORY (INHALATION) at 06:03

## 2018-05-06 RX ADMIN — BUDESONIDE 500 MCG: 0.5 INHALANT RESPIRATORY (INHALATION) at 18:30

## 2018-05-06 RX ADMIN — FUROSEMIDE 80 MG: 40 TABLET ORAL at 17:21

## 2018-05-06 RX ADMIN — ALBUTEROL SULFATE 2.5 MG: 2.5 SOLUTION RESPIRATORY (INHALATION) at 18:30

## 2018-05-06 RX ADMIN — Medication 5 ML: at 05:10

## 2018-05-06 RX ADMIN — WARFARIN SODIUM 5 MG: 2.5 TABLET ORAL at 18:10

## 2018-05-06 RX ADMIN — FUROSEMIDE 80 MG: 40 TABLET ORAL at 09:15

## 2018-05-06 RX ADMIN — ESCITALOPRAM 10 MG: 10 TABLET, FILM COATED ORAL at 09:15

## 2018-05-06 RX ADMIN — POLYETHYLENE GLYCOL (3350) 17 G: 17 POWDER, FOR SOLUTION ORAL at 09:10

## 2018-05-06 RX ADMIN — LORAZEPAM 1 MG: 1 TABLET ORAL at 21:50

## 2018-05-06 RX ADMIN — ERYTHROPOIETIN 4000 UNITS: 4000 INJECTION, SOLUTION INTRAVENOUS; SUBCUTANEOUS at 16:59

## 2018-05-06 RX ADMIN — FERROUS SULFATE TAB 325 MG (65 MG ELEMENTAL FE) 325 MG: 325 (65 FE) TAB at 09:15

## 2018-05-06 RX ADMIN — Medication 5 ML: at 14:00

## 2018-05-06 RX ADMIN — ALBUTEROL SULFATE 2.5 MG: 2.5 SOLUTION RESPIRATORY (INHALATION) at 11:11

## 2018-05-06 RX ADMIN — SACUBITRIL AND VALSARTAN 1 TABLET: 24; 26 TABLET, FILM COATED ORAL at 09:15

## 2018-05-06 NOTE — PROGRESS NOTES
Brianna Anthony MD,   Medical Director  [address] [facility], [address] [facility]  Tel: [phone]       [facility] PROGRESS NOTE    Matthew Hernandez  Admit Date: 5/2/2018  Admit Diagnosis: Debillity ; Physical debility    Subjective     Slept well. No cp. Less PRUETT. Pain mostly in right knee. Hand pain improved.  Good spirits    Objective:     Current Facility-Administered Medications   Medication Dose Route Frequency    ferrous sulfate tablet 325 mg  1 Tab Oral BID WITH MEALS    0.9% sodium chloride infusion 250 mL  250 mL IntraVENous PRN    0.9% sodium chloride infusion 250 mL  250 mL IntraVENous PRN    acetaminophen (TYLENOL) tablet 650 mg  650 mg Oral Q4H PRN    bisacodyl (DULCOLAX) tablet 5 mg  5 mg Oral DAILY PRN    diphenhydrAMINE (BENADRYL) capsule 25 mg  25 mg Oral Q4H PRN    LORazepam (ATIVAN) tablet 1 mg  1 mg Oral BID PRN    naloxone (NARCAN) injection 0.4 mg  0.4 mg IntraVENous PRN    prochlorperazine (COMPAZINE) injection 5 mg  5 mg IntraVENous Q8H PRN    sodium chloride (NS) flush 5-10 mL  5-10 mL IntraVENous Q8H    sodium chloride (NS) flush 5-10 mL  5-10 mL IntraVENous PRN    albuterol (PROVENTIL VENTOLIN) nebulizer solution 2.5 mg  2.5 mg Nebulization Q4H PRN    bisacodyl (DULCOLAX) suppository 10 mg  10 mg Rectal DAILY PRN    budesonide (PULMICORT) 500 mcg/2 ml nebulizer suspension  500 mcg Nebulization BID RT    And    albuterol (PROVENTIL VENTOLIN) nebulizer solution 2.5 mg  2.5 mg Nebulization Q6HWA RT    carvedilol (COREG) tablet 3.125 mg  3.125 mg Oral BID WITH MEALS    escitalopram oxalate (LEXAPRO) tablet 10 mg  10 mg Oral DAILY    furosemide (LASIX) tablet 80 mg  80 mg Oral BID    loratadine (CLARITIN) tablet 10 mg  10 mg Oral DAILY PRN    oxyCODONE IR (ROXICODONE) tablet 10 mg  10 mg Oral Q4H PRN    polyethylene glycol (MIRALAX) packet 17 g  17 g Oral DAILY    sacubitril-valsartan (ENTRESTO) 24-26 mg tablet 1 Tab  1 Tab Oral BID    senna (SENOKOT) tablet 8.6 mg  1 Tab Oral DAILY    simvastatin (ZOCOR) tablet 20 mg  20 mg Oral QHS    tiotropium (SPIRIVA) inhalation capsule 18 mcg  1 Cap Inhalation DAILY    traMADol (ULTRAM) tablet 50 mg  50 mg Oral Q6H PRN    warfarin (COUMADIN) tablet 2.5 mg  2.5 mg Oral Once per day on Mon Fri    warfarin (COUMADIN) tablet 5 mg  5 mg Oral Once per day on Sun Tue Wed Thu Sat     Review of Systems:Denies chest pain, shortness of breath, cough, headache, visual problems, abdominal pain, dysurea, calf pain. Pertinent positives are as noted in the medical records and unremarkable otherwise. Visit Vitals    /78 (BP 1 Location: Right arm, BP Patient Position: At rest)    Pulse 69    Temp 97.9 °F (36.6 °C)    Resp 19    Ht 5' 2\" (1.575 m)    Wt 223 lb (101.2 kg)    SpO2 95%    Breastfeeding No    BMI 40.79 kg/m2    3L O2 per NC; chronic  Physical Exam:   General: Alert and age appropriately oriented. No acute cardio respiratory distress. HEENT: Normocephalic,no scleral icterus; conjunctival hemor. Left eye/echym  Oral mucosa moist without cyanosis   Lungs: Clear to auscultation  bilaterally. Respiration even and unlabored   Heart: Regular rate and rhythm, S1, S2   No  murmurs, clicks, rub or gallops   Abdomen: Soft, non-tender, nondistended. Bowel sounds present. No organomegaly. obese   Genitourinary: defer   Neuromuscular:      Non focal. Limited by prox right shoulder chronic RTC issues   hindered by splint on left and taping right 4th and fifth digits; sens intact. DF/PF 5   Skin/extremity: No rashes, no erythema.  No calf tenderness BLE  Trade edema                                                                            Functional Assessment:          Balance  Sitting - Static: Good (unsupported) (05/05/18 1200)  Sitting - Dynamic: Good (unsupported) (05/05/18 1200)  Standing - Static: Good (05/05/18 1200)  Standing - Dynamic : Impaired (05/05/18 1200) Feeding  Adaptive Equipment: Non-skid surface;Built up spoon;Built up fork;Plate guard (95/11/33 1028)             Maryan Corral Fall Risk Assessment:  Maryan Corral Fall Risk  Mobility: Ambulates or transfers with assist devices or assistance (05/06/18 0730)  Mobility Interventions: Patient to call before getting OOB;Utilize walker, cane, or other assitive device (05/06/18 0730)  Mentation: Alert, oriented x 3 (05/06/18 0730)  Medication: Patient receiving anticonvulsants, sedatives(tranquilizers), psychotropics or hypnotics, hypoglycemics, narcotics, sleep aids, antihypertensives, laxatives, or diuretics (05/06/18 0730)  Medication Interventions: Evaluate medications/consider consulting pharmacy; Patient to call before getting OOB (05/06/18 0730)  Elimination: Needs assistance with toileting (05/06/18 0730)  Elimination Interventions: Call light in reach; Patient to call for help with toileting needs (05/06/18 0730)  Prior Fall History: Before admission in past 12 months _home or previous inpatient care) (05/06/18 0730)  History of Falls Interventions: Door open when patient unattended;Evaluate medications/consider consulting pharmacy (05/06/18 0730)  Total Score: 4 (05/06/18 0730)  Standard Fall Precautions: Yes (05/03/18 2239)  High Fall Risk: Yes (05/06/18 0730)     Speech Assessment:         Ambulation:  Gait  Distance (ft): 60 Feet (ft) (60ft x 4 with 3 to 4 min rest breaks) (05/05/18 1200)  Assistive Device:  (No device) (05/05/18 1200)     Labs/Studies:  Recent Results (from the past 72 hour(s))   PROTHROMBIN TIME + INR    Collection Time: 05/04/18  5:21 AM   Result Value Ref Range    Prothrombin time 28.0 (H) 11.5 - 14.5 sec    INR 2.7     PROTHROMBIN TIME + INR    Collection Time: 05/05/18  6:44 AM   Result Value Ref Range    Prothrombin time 30.9 (H) 11.5 - 14.5 sec    INR 3.0     PROTHROMBIN TIME + INR    Collection Time: 05/06/18  6:39 AM   Result Value Ref Range    Prothrombin time 29.2 (H) 11.5 - 14.5 sec    INR 2. 8     HGB & HCT    Collection Time: 05/06/18  6:39 AM   Result Value Ref Range    HGB 8.1 (L) 11.7 - 15.4 g/dL    HCT 25.9 (L) 35.8 - 46.3 %       Assessment:     Problem List as of 5/6/2018  Date Reviewed: 4/27/2018          Codes Class Noted - Resolved    * (Principal)Physical debility ICD-10-CM: R53.81  ICD-9-CM: 799.3  5/2/2018 - Present        Closed nondisplaced fracture of shaft of fifth metacarpal bone of left hand ICD-10-CM: S62.357A  ICD-9-CM: 815.03  4/28/2018 - Present        [facility] ([facility] ICD-10-CM: I62.00  ICD-9-CM: 432.1  4/27/2018 - Present        Long term (current) use of anticoagulants (Chronic) ICD-10-CM: Z79.01  ICD-9-CM: V58.61  4/19/2018 - Present        Dysthymia ICD-10-CM: F34.1  ICD-9-CM: 300.4  4/5/2018 - Present        Type 2 diabetes mellitus with nephropathy (HCC) (Chronic) ICD-10-CM: E11.21  ICD-9-CM: 250.40, 583.81  12/18/2017 - Present        Pulmonary hypertension (HCC) (Chronic) ICD-10-CM: I27.20  ICD-9-CM: 416.8  6/15/2016 - Present        S/P AVR (aortic valve replacement) (Chronic) ICD-10-CM: Z95.2  ICD-9-CM: V43.3  Unknown - Present    Overview Signed 2/23/2016 11:04 AM by Hugo Green     Mechanical/Artific             Cardiomyopathy ([facility] (Chronic) ICD-10-CM: I42.9  ICD-9-CM: 425.4  Unknown - Present        Osteopenia ICD-10-CM: M85.80  ICD-9-CM: 733.90  Unknown - Present        HLD (hyperlipidemia) (Chronic) ICD-10-CM: E78.5  ICD-9-CM: 272.4  Unknown - Present        Osteoarthritis ICD-10-CM: M19.90  ICD-9-CM: 715.90  Unknown - Present        ICD (implantable cardioverter-defibrillator) in place ICD-10-CM: Z95.810  ICD-9-CM: V45.02  10/2/2014 - Present    Overview Signed 10/2/2014  4:58 PM by Jhonny See single-chamber ICD implantation 10/20/14             Diabetes mellitus type 2, diet-controlled ([facility] (Chronic) ICD-10-CM: E11.9  ICD-9-CM: 250.00  8/28/2014 - Present        COPD (chronic obstructive pulmonary disease) ([facility] (Chronic) ICD-10-CM: J44.9  ICD-9-CM: 356  4/2/2014 - Present        REJI (obstructive sleep apnea)-cpap (Chronic) ICD-10-CM: Y73.43  ICD-9-CM: 327.23  4/2/2014 - Present        CKD (chronic kidney disease) stage 3, GFR 30-59 ml/min (Chronic) ICD-10-CM: N18.3  ICD-9-CM: 585.3  7/10/2013 - Present        Anticoagulated on Coumadin (Chronic) ICD-10-CM: Z51.81, Z79.01  ICD-9-CM: V58.83, V58.61  7/9/2013 - Present    Overview Signed 7/9/2013  4:16 PM by Mickie Arauz NP     S/P AVR             CAD (coronary artery disease) (Chronic) ICD-10-CM: I25.10  ICD-9-CM: 414.00  1/20/2013 - Present    Overview Signed 5/20/2014 10:05 AM by Zeynep Lorenzana NP     5/8/14 PCI LAD with stent placed             Chronic combined systolic and diastolic heart failure (HCC) (Chronic) ICD-10-CM: I50.42  ICD-9-CM: 428.42  1/20/2013 - Present    Overview Addendum 4/28/2018  4:53 AM by Vaughn Morris MD     5/8/14 ECHO:  EF 10-15%  12/2017:  EF 25-30%             Essential hypertension, benign (Chronic) ICD-10-CM: I10  ICD-9-CM: 401.1  1/20/2013 - Present        MDS (myelodysplastic syndrome) (HCC) (Chronic) ICD-10-CM: D46.9  ICD-9-CM: 238.75  12/17/2011 - Present    Overview Addendum 8/29/2013 11:06 AM by Cl Smith started in August, 2011 12/18/11 Procrit weekly and Iron stores. 5-12 12-13-12 good response to 3 weekly procrit did not need it last time    3-7-13 Pt doing well. Just wanted a \"check-up. \" Responding to Procrit every three weeks.   8-29-13 patient has missed some injections on recently restarted hemoglobin only issue , takes oral iron iron stores each time                Iron deficiency anemia due to chronic blood loss (Chronic) ICD-10-CM: D50.0  ICD-9-CM: 280.0  7/29/2009 - Present        RESOLVED: CHF (congestive heart failure) ([facility] ICD-10-CM: I50.9  ICD-9-CM: 428.0  2/17/2017 - 4/27/2018        RESOLVED: Routine general medical examination at a health care facility ICD-10-CM: Z00.00  ICD-9-CM: V70.0 12/16/2016 - 4/27/2018        RESOLVED: Chest pain, unspecified ICD-10-CM: R07.9  ICD-9-CM: 786.50  12/7/2016 - 12/16/2016        RESOLVED: Chronic left-sided low back pain without sciatica ICD-10-CM: M54.5, G89.29  ICD-9-CM: 724.2, 338.29  6/15/2016 - 4/27/2018        RESOLVED: Tachycardia ICD-10-CM: R00.0  ICD-9-CM: 785.0  Unknown - 12/16/2016    Overview Signed 2/23/2016 11:02 AM by Micky Marc H/B              RESOLVED: Chronic respiratory failure (HCC) (Chronic) ICD-10-CM: J96.10  ICD-9-CM: 518.83  4/2/2014 - 4/27/2018               Plan:   S/p fall with bilateral hand fxs, right knee sprain, FELIZ on CKD, anemia with underlying dx of MDS; physical debility      Continue daily physician medical management:  Pneumonia prophylaxis- Incentive spirometer every hour while awake      Digit fxs both hands; cont splint/rachel tape; only restriction is no lifting; has chronic RUE weakness from what I suspect is a RTC injury; xray neg; OT working on modalities      COPD/REJI; pt is on Trilogy at [facility]. She manages this at home but having difficulty do to fxs in her hands  -cont Proventil , Pulmicort; Spiriva;  try weaning daytime O2; currently 4-6L due to hypoxia. Doesn't use during day at home per pt; 5/3 NOW reports chronic 3L at home, thus at baseline  -5/3 down from 4 to 2L , sats 98%; 5/4 on 3L  sats 96% (obviously needs due to drop noted to 65% sats when O2 accidentally not hooked up last noc and pt symptomatic with chest pain which resolved immediately when placed back on O2  -5/5 comfortable.  No PRUETT, on home 3L NC, lowest sat 91%      Severe CHF/cardiomyopathy; compensated currently; cont Coreg, Lasix; has an ICD ; on entresto as well, 5/3  -5/3 no evidence of acute issues; 5/4 compensated well and considering dec Lasix  -5/5 recheck renal fx, may dec lasix      DVT risk / DVT Prophylaxis- Will require daily physician exam to assess for signs and symptoms as patient is at increased risk for of thromboembolism. Mobilization as tolerated. Intermittent pneumatic compression devices when in bed Thigh-high or knee-high thromboembolic deterrent hose when out of bed. On therapeutic Coumadin       Pain Control: ongoing significant pain in back, shoulders, knees and hands which is stable and controlled by PRN meds. Will require regular pain assessment and comprenhensive pain management,   -has end stage OA of the knees and now with right knee sprain; ice/ace wrap, modalities      Wound Care: Monitor wound status daily per staff and physician. At risk for failure. Will require 24/7 rehab nursing. None needed but at risk due to immobility   -5/3 hematoma dorsum right hand; warm compress      Hypertension - BP uncontrolled, fluctuating, managed medically.  Has hypotension, asymptomatic, parameters with meds   -5/3 119-141 SBP, controlled; 5/4 /66 -; on Coreg, Lasix and Entresto; CHF well compensated; consider decreasing lasix which is dosed at 80mg bid  5/5 bp 141/60; 5/6 117/78      Scleral hemorrhage; clinically unchanged but less periocular swelling      MDS/chronic anemia; monitor , replace as needed, consider epogen  -5/3 hgb stable at 8.4; 5/4 consider dose of epogen but I believe there are restrictions to ordering and may need hematology, will increase iron due to deficiency; recheck 5/6; 8.1  -consult hematology Mond if labs concerning; looking back at past notes; was on weekly procrit if hgb < 10; will order      FELIZ on CKD; improving, monitor labs and adjust meds; daily wt due to CHF  -creat slowly improving 5/3; 1.53 from 1.71 yesterday, BUN remains unchanged, 50's; monitor labs 3x/wk      [facility]; goal INR 2-3; inr 2.9; monitor and have pharmacy assist in dosing;   5/4 INR therap at 2.7 on home alternating doses of coumadin (2.5mg M/F and 5mg S/Tue/W/TH/SAT)      ? Hx of DM listed but on no medications ; do have on diet restrictions; carbohydrate restrictions      Urinary retention/ neurogenic bladder - schedule voids q6-8 hrs. Check post-void residual as needed; In and Out catheterize if post-void residual is more than 400 cc.  -5/4 no issues      bowel program - constipation, added bowel program; 5/4 continent; no const/diarr      GERD - resume PPI. At times may need additional antacids, Maalox prn.             Time spent was 25 minutes with over 1/2 in direct patient care/examination, consultation and coordination of care.      Signed By: Gilmar Srivastava MD     May 6, 2018

## 2018-05-06 NOTE — PROGRESS NOTES
BLADDER:    Pt does not have a medina catheter that staff manages. Pt does not take medication. Pt is continent. of bladder and voids in toilet  Pt requires staff to position device Pt has had 0 bladder accidents during this shift requiring standby assistance/setup to clean up. (An accident is when the episode is not contained in a brief AND/OR the clothing/linen requires changing/cleaning up.)   Bowel  pt has not had a bowel movement this shift.

## 2018-05-06 NOTE — PROGRESS NOTES
Problem: Falls - Risk of  Goal: *Absence of Falls  Document Tia Fall Risk and appropriate interventions in the flowsheet.    Outcome: Progressing Towards Goal  Fall Risk Interventions:  Mobility Interventions: Patient to call before getting OOB, Utilize walker, cane, or other assitive device         Medication Interventions: Evaluate medications/consider consulting pharmacy, Patient to call before getting OOB    Elimination Interventions: Call light in reach, Patient to call for help with toileting needs    History of Falls Interventions: Door open when patient unattended, Evaluate medications/consider consulting pharmacy

## 2018-05-06 NOTE — PROGRESS NOTES
Warfarin dosing per pharmacist    Rossy Brucejulia Vizcarra is a 77 y.o. female. Height: 5' 2\" (157.5 cm)    Weight: 101.2 kg (223 lb)    Indication:  Mechanical aortic valve replacement    Goal INR:  2-3    Home dose:  2.5 mg Mon, Fri; 5 mg all other days    Risk factors or significant drug interactions:  none    Other anticoagulants:  none    Daily Monitoring  Date  INR     Warfarin dose HGB              Notes  4/28  2.1          5 mg + 2.5 mg 7.4  4/29  2.4  5 mg  7  4/30  2.6  2.5 mg  8  5/1  2.9  5 mg  7.9  5/2  2.9  5 mg  8.5  Pharmacy consulted  5/3  2.7  5 mg  8.4  5/4  2.7  2.5 mg  ---   5/5  3  5 mg  ---   5/6  2.8  5mg    Pharmacy consulted to dose warfarin for Ms. Corral. She has been transferred to inpatient rehab following hospital stay for complications following a fall. She is followed by [address] [place] [address] [address] [facility] at the anti-coag clinic and was therapeutic on her home dose at her last visit on 4/19/2018. Continue home dose. Pharmacy will continue to follow. Please call with any questions.     Thank you,  Mary Kay Underwoodster, PharmD

## 2018-05-07 LAB
ANION GAP SERPL CALC-SCNC: 7 MMOL/L (ref 7–16)
APPEARANCE UR: CLEAR
BILIRUB UR QL: NEGATIVE
BUN SERPL-MCNC: 73 MG/DL (ref 8–23)
CALCIUM SERPL-MCNC: 9.4 MG/DL (ref 8.3–10.4)
CHLORIDE SERPL-SCNC: 99 MMOL/L (ref 98–107)
CO2 SERPL-SCNC: 34 MMOL/L (ref 21–32)
COLOR UR: YELLOW
CREAT SERPL-MCNC: 1.99 MG/DL (ref 0.6–1)
ERYTHROCYTE [DISTWIDTH] IN BLOOD BY AUTOMATED COUNT: 14.5 % (ref 11.9–14.6)
GLUCOSE SERPL-MCNC: 128 MG/DL (ref 65–100)
GLUCOSE UR STRIP.AUTO-MCNC: NEGATIVE MG/DL
HCT VFR BLD AUTO: 25.6 % (ref 35.8–46.3)
HGB BLD-MCNC: 8.3 G/DL (ref 11.7–15.4)
HGB UR QL STRIP: NEGATIVE
INR PPP: 2.8
KETONES UR QL STRIP.AUTO: NEGATIVE MG/DL
LEUKOCYTE ESTERASE UR QL STRIP.AUTO: NEGATIVE
MCH RBC QN AUTO: 29.6 PG (ref 26.1–32.9)
MCHC RBC AUTO-ENTMCNC: 32.4 G/DL (ref 31.4–35)
MCV RBC AUTO: 91.4 FL (ref 79.6–97.8)
NITRITE UR QL STRIP.AUTO: NEGATIVE
PH UR STRIP: 6 [PH] (ref 5–9)
PLATELET # BLD AUTO: 228 K/UL (ref 150–450)
PMV BLD AUTO: 9.8 FL (ref 10.8–14.1)
POTASSIUM SERPL-SCNC: 4.5 MMOL/L (ref 3.5–5.1)
PROT UR STRIP-MCNC: NEGATIVE MG/DL
PROTHROMBIN TIME: 28.9 SEC (ref 11.5–14.5)
RBC # BLD AUTO: 2.8 M/UL (ref 4.05–5.25)
SODIUM SERPL-SCNC: 140 MMOL/L (ref 136–145)
SP GR UR REFRACTOMETRY: 1.01 (ref 1–1.02)
UROBILINOGEN UR QL STRIP.AUTO: 1 EU/DL (ref 0.2–1)
WBC # BLD AUTO: 7.2 K/UL (ref 4.3–11.1)

## 2018-05-07 PROCEDURE — 80048 BASIC METABOLIC PNL TOTAL CA: CPT | Performed by: PHYSICAL MEDICINE & REHABILITATION

## 2018-05-07 PROCEDURE — 36415 COLL VENOUS BLD VENIPUNCTURE: CPT | Performed by: PHYSICAL MEDICINE & REHABILITATION

## 2018-05-07 PROCEDURE — 94640 AIRWAY INHALATION TREATMENT: CPT

## 2018-05-07 PROCEDURE — 99232 SBSQ HOSP IP/OBS MODERATE 35: CPT | Performed by: PHYSICAL MEDICINE & REHABILITATION

## 2018-05-07 PROCEDURE — 36430 TRANSFUSION BLD/BLD COMPNT: CPT

## 2018-05-07 PROCEDURE — 82272 OCCULT BLD FECES 1-3 TESTS: CPT | Performed by: PHYSICAL MEDICINE & REHABILITATION

## 2018-05-07 PROCEDURE — 85027 COMPLETE CBC AUTOMATED: CPT | Performed by: PHYSICAL MEDICINE & REHABILITATION

## 2018-05-07 PROCEDURE — 77010033678 HC OXYGEN DAILY

## 2018-05-07 PROCEDURE — P9040 RBC LEUKOREDUCED IRRADIATED: HCPCS | Performed by: PHYSICAL MEDICINE & REHABILITATION

## 2018-05-07 PROCEDURE — 97116 GAIT TRAINING THERAPY: CPT

## 2018-05-07 PROCEDURE — 86923 COMPATIBILITY TEST ELECTRIC: CPT | Performed by: PHYSICAL MEDICINE & REHABILITATION

## 2018-05-07 PROCEDURE — 74011000250 HC RX REV CODE- 250: Performed by: PHYSICAL MEDICINE & REHABILITATION

## 2018-05-07 PROCEDURE — 30233N1 TRANSFUSION OF NONAUTOLOGOUS RED BLOOD CELLS INTO PERIPHERAL VEIN, PERCUTANEOUS APPROACH: ICD-10-PCS | Performed by: PHYSICAL MEDICINE & REHABILITATION

## 2018-05-07 PROCEDURE — 74011250637 HC RX REV CODE- 250/637: Performed by: PHYSICAL MEDICINE & REHABILITATION

## 2018-05-07 PROCEDURE — 97110 THERAPEUTIC EXERCISES: CPT

## 2018-05-07 PROCEDURE — 85610 PROTHROMBIN TIME: CPT | Performed by: PHYSICAL MEDICINE & REHABILITATION

## 2018-05-07 PROCEDURE — 97530 THERAPEUTIC ACTIVITIES: CPT

## 2018-05-07 PROCEDURE — 86900 BLOOD TYPING SEROLOGIC ABO: CPT | Performed by: PHYSICAL MEDICINE & REHABILITATION

## 2018-05-07 PROCEDURE — 94760 N-INVAS EAR/PLS OXIMETRY 1: CPT

## 2018-05-07 PROCEDURE — 97535 SELF CARE MNGMENT TRAINING: CPT

## 2018-05-07 PROCEDURE — 65310000000 HC RM PRIVATE REHAB

## 2018-05-07 PROCEDURE — 81003 URINALYSIS AUTO W/O SCOPE: CPT | Performed by: PHYSICAL MEDICINE & REHABILITATION

## 2018-05-07 RX ORDER — ACETAMINOPHEN 325 MG/1
650 TABLET ORAL ONCE
Status: COMPLETED | OUTPATIENT
Start: 2018-05-07 | End: 2018-05-07

## 2018-05-07 RX ORDER — DIPHENHYDRAMINE HCL 25 MG
25 CAPSULE ORAL
Status: DISCONTINUED | OUTPATIENT
Start: 2018-05-07 | End: 2018-05-16 | Stop reason: HOSPADM

## 2018-05-07 RX ORDER — SODIUM CHLORIDE 9 MG/ML
250 INJECTION, SOLUTION INTRAVENOUS AS NEEDED
Status: DISCONTINUED | OUTPATIENT
Start: 2018-05-07 | End: 2018-05-16 | Stop reason: HOSPADM

## 2018-05-07 RX ORDER — FUROSEMIDE 10 MG/ML
20 INJECTION INTRAMUSCULAR; INTRAVENOUS ONCE
Status: DISPENSED | OUTPATIENT
Start: 2018-05-07 | End: 2018-05-07

## 2018-05-07 RX ADMIN — ALBUTEROL SULFATE 2.5 MG: 2.5 SOLUTION RESPIRATORY (INHALATION) at 11:20

## 2018-05-07 RX ADMIN — TIOTROPIUM BROMIDE 18 MCG: 18 CAPSULE ORAL; RESPIRATORY (INHALATION) at 05:45

## 2018-05-07 RX ADMIN — ALBUTEROL SULFATE 2.5 MG: 2.5 SOLUTION RESPIRATORY (INHALATION) at 05:44

## 2018-05-07 RX ADMIN — FUROSEMIDE 80 MG: 40 TABLET ORAL at 18:17

## 2018-05-07 RX ADMIN — WARFARIN SODIUM 2.5 MG: 2.5 TABLET ORAL at 18:17

## 2018-05-07 RX ADMIN — SENNOSIDES 8.6 MG: 8.6 TABLET, FILM COATED ORAL at 08:21

## 2018-05-07 RX ADMIN — OXYCODONE HYDROCHLORIDE 10 MG: 5 TABLET ORAL at 08:21

## 2018-05-07 RX ADMIN — SACUBITRIL AND VALSARTAN 1 TABLET: 24; 26 TABLET, FILM COATED ORAL at 08:21

## 2018-05-07 RX ADMIN — CARVEDILOL 3.12 MG: 3.12 TABLET, FILM COATED ORAL at 18:17

## 2018-05-07 RX ADMIN — OXYCODONE HYDROCHLORIDE 10 MG: 5 TABLET ORAL at 15:08

## 2018-05-07 RX ADMIN — CARVEDILOL 3.12 MG: 3.12 TABLET, FILM COATED ORAL at 08:21

## 2018-05-07 RX ADMIN — SIMVASTATIN 20 MG: 20 TABLET, FILM COATED ORAL at 21:10

## 2018-05-07 RX ADMIN — ALBUTEROL SULFATE 2.5 MG: 2.5 SOLUTION RESPIRATORY (INHALATION) at 17:06

## 2018-05-07 RX ADMIN — BUDESONIDE 500 MCG: 0.5 INHALANT RESPIRATORY (INHALATION) at 17:06

## 2018-05-07 RX ADMIN — FUROSEMIDE 80 MG: 40 TABLET ORAL at 08:21

## 2018-05-07 RX ADMIN — DIPHENHYDRAMINE HYDROCHLORIDE 25 MG: 25 CAPSULE ORAL at 14:36

## 2018-05-07 RX ADMIN — ESCITALOPRAM 10 MG: 10 TABLET, FILM COATED ORAL at 08:21

## 2018-05-07 RX ADMIN — POLYETHYLENE GLYCOL (3350) 17 G: 17 POWDER, FOR SOLUTION ORAL at 08:21

## 2018-05-07 RX ADMIN — FERROUS SULFATE TAB 325 MG (65 MG ELEMENTAL FE) 325 MG: 325 (65 FE) TAB at 08:00

## 2018-05-07 RX ADMIN — ACETAMINOPHEN 650 MG: 325 TABLET ORAL at 14:36

## 2018-05-07 RX ADMIN — SACUBITRIL AND VALSARTAN 1 TABLET: 24; 26 TABLET, FILM COATED ORAL at 18:17

## 2018-05-07 RX ADMIN — BUDESONIDE 500 MCG: 0.5 INHALANT RESPIRATORY (INHALATION) at 05:45

## 2018-05-07 RX ADMIN — FERROUS SULFATE TAB 325 MG (65 MG ELEMENTAL FE) 325 MG: 325 (65 FE) TAB at 18:17

## 2018-05-07 NOTE — PROGRESS NOTES
OT Daily Note    Time In 1140   Time Out 1218     Subjective: \"That arm is really popping today\" Agreeable to therapy. Pain:Right shoulder did not do well with joint mobilizations this session. Interdisciplinary Communication: Collaborated with Shanti Dave and patient is making progress towards goals. Precautions: Other (comment) (fall precautions, NWB Left hand)    RUE Daily Assessment   Joint mobilization for pain relief and to increase AROM in right [facility] joint and scapular mobilization with less success today second to increase pain and arthritic resistance. Eating Daily Assessment   Continued training with plate guard, non skid surface, and built up handle with continued improving quality seen during lunch. Ended session: In recliner, all needs within reach, legs up.      Ani Causey OTR/L

## 2018-05-07 NOTE — PROGRESS NOTES
Warfarin dosing per pharmacist    José Luis Casillas Oralia Benítez is a 77 y.o. female. Height: 5' 2\" (157.5 cm)    Weight: 101.2 kg (223 lb)    Indication:  Mechanical aortic valve replacement    Goal INR:  2-3    Home dose:  2.5 mg Mon, Fri; 5 mg all other days    Risk factors or significant drug interactions:  none    Other anticoagulants:  none    Daily Monitoring  Date  INR     Warfarin dose HGB              Notes  4/28  2.1          5 mg + 2.5 mg 7.4  4/29  2.4  5 mg  7  4/30  2.6  2.5 mg  8  5/1  2.9  5 mg  7.9  5/2  2.9  5 mg  8.5  Pharmacy consulted  5/3  2.7  5 mg  8.4  5/4  2.7  2.5 mg  ---   5/5  3  5 mg  ---   5/6  2.8  5 mg  8.1  5/7  2.8  2.5 mg  8.3    Pharmacy consulted to dose warfarin for Ms. Corral. She has been transferred to inpatient rehab following hospital stay for complications following a fall. She is followed by [address] [place] [address] [address] at the anti-coag clinic and was therapeutic on her home dose at her last visit on 4/19/2018. INR 2.8. Continue home dose of warfarin. Will receive 2.5 mg today. Daily INR. Pharmacy will continue to follow. Please call with any questions.     Thank you,  Laly Jackson, PharmD  Clinical Pharmacist  [phone]

## 2018-05-07 NOTE — PROGRESS NOTES
05/07/18 0823   Time Spent With Patient   Time In 0700   Time Out 0742   Patient Seen For: AM;ADLs   Feeding/Eating   Feeding/Eating Assistance SBA   Adaptive Equipment Built up spoon;Plate guard;Built up fork;Non-skid surface   Grooming   Grooming Assistance  Mod A   Upper Body Bathing   Bathing Assistance, Upper Mod A   Position Performed Seated in chair   Comments improved ability to wash abdomen and chest.    Lower Body Bathing   Bathing Assistance, Lower  Mod A   Position Performed Seated in chair;Standing   Comments able to complete pericare this session in stance by sink. Upper Body Dressing    Dressing Assistance  Max A   Comments able to get mostly over head and initate threading of RUE. Lower Body Dressing    Dressing Assistance  Dep   Position Performed Seated in chair;Standing     S: \"I am doing okay, a little tired and dizzy today. \" Agreeable to therapy. Focus of session was on morning ADL routine and s/u for breakfast.   Patient was able to ambulate ~10 feet using a HHA with minimal assist. Sit to stand varied from min assist to max assist during session. .   Pain not indicated during session. Collaborated with PTOnesimo and confirmed patient is on track to reach goals as documented in the care plan. Patient tolerated session well, but strength, ROM, Pain, activity tolerance are still below baseline and requires skilled facilitation to successfully and safely complete ADL's and transfers. Patient ended session in recliner with call remote, breakfast, and phone within reach.      Tod Doty, OTR

## 2018-05-07 NOTE — PROGRESS NOTES
PHYSICAL THERAPY DAILY NOTE  Time In: 1302  Time Out: 1331  Patient Seen For: PM;Gait training;Patient education; Therapeutic exercise;Transfer training; Other (see progress notes)    Subjective: patient reporting she feels weak this PM. Reports she is going to get a blood transfusion some time today         Objective:Vital Signs: 02 at 3 lpm  Patient Vitals for the past 12 hrs:   Temp Pulse Resp BP SpO2   05/07/18 1120 - - - - 99 %   05/07/18 0808 97.9 °F (36.6 °C) 83 16 125/77 92 %   05/07/18 0545 - - - - 94 %     Pain level: 4 to 7 out of 10  Pain location:lateral aspect of both hands and right knee  Pain interventions:Pain medication per RN, rest, positioning,ROM for right knee    Patient education:transfer training, gait training, fall precautions, activity pacing,NWB LUE precautions, body mechanics, energy conservation, LE HEP, Patient verbalizing understanding and demonstrating understanding of patient education. Recommend follow up education.     Interdisciplinary Communication:spoke with IV team regarding blood transfusion    Other (comment) (fall precautions, NWB Left hand)  GROSS ASSESSMENT Daily Assessment     decreased treatment time this PM due to IV team to see patient        BED/MAT MOBILITY Daily Assessment   Increased time and effort to complete with cues for body mechanics   Rolling Right : 0 (Not tested)  Rolling Left : 0 (Not tested)  Supine to Sit : 0 (Not tested)  Sit to Supine : 5 (Supervision)       TRANSFERS Daily Assessment   Increased time and effort to complete with cues for body mechanics   Transfer Type: SPT without device  Transfer Assistance : 5 (Stand-by assistance)  Sit to Stand Assistance: Minimal assistance (from recliner, SBA from w/c)  Car Transfers: Not tested       GAIT Daily Assessment    Amount of Assistance: 5 (Supervision/setup)  Distance (ft): 10 Feet (ft) (10ft x 2  recliner to w/c and w/c to bed)  Assistive Device:  (No device)       STEPS or STAIRS Daily Assessment Steps/Stairs Ambulated (#): 0  Level of Assist : 0 (Not tested)       BALANCE Daily Assessment    Sitting - Static: Good (unsupported)  Sitting - Dynamic: Good (unsupported)  Standing - Static: Good  Standing - Dynamic : Impaired       WHEELCHAIR MOBILITY Daily Assessment    Curbs/Ramps Assist Required (FIM Score): 0 (Not tested)  Wheelchair Setup Assist Required : 0 (Not tested)       LOWER EXTREMITY EXERCISES Daily Assessment    Extremity: Both  Exercise Type #1: Seated lower extremity strengthening  Sets Performed: 2  Reps Performed: 10  Level of Assist: Minimal assistance     SEATED EXERCISES Sets Reps Comments   Ankle Pumps 1 20    Hip Flexion 2 10    Long Arc Quads 2 10    Hip Adduction/Ball Squeeze 1 20    Hip Abduction with red Theraband 2 10    Hamstring Curls with red Theraband 2 10    Hip Extension with red Theraband 2 10             Assessment: Decreased tolerance to treatment this Pm due to fatigue possibly from low HGB       Patient returned to room at end of treatment. Patient supine in bed with head of bed elevated and bed rails up x 2. Needs placed in reach of patient. 02 at 3 lpm. IV team in to start IV    Plan of Care: Continue with POC and progress as tolerated.      Yaz Saha, PT  5/7/2018

## 2018-05-07 NOTE — PROGRESS NOTES
Problem: Falls - Risk of  Goal: *Absence of Falls  Document Tia Fall Risk and appropriate interventions in the flowsheet. Outcome: Progressing Towards Goal  Fall Risk Interventions:  Mobility Interventions: Utilize walker, cane, or other assitive device         Medication Interventions: Patient to call before getting OOB    Elimination Interventions: Call light in reach    History of Falls Interventions: Door open when patient unattended        Problem: Pressure Injury - Risk of  Goal: *Prevention of pressure injury  Document Bahman Scale and appropriate interventions in the flowsheet.    Pressure Injury Interventions:       Moisture Interventions: Absorbent underpads    Activity Interventions: Increase time out of bed    Mobility Interventions: HOB 30 degrees or less    Nutrition Interventions: Document food/fluid/supplement intake    Friction and Shear Interventions: HOB 30 degrees or less

## 2018-05-07 NOTE — PROGRESS NOTES
Problem: Falls - Risk of  Goal: *Absence of Falls  Document Tia Fall Risk and appropriate interventions in the flowsheet. Outcome: Progressing Towards Goal  Fall Risk Interventions:  Mobility Interventions: Utilize walker, cane, or other assitive device         Medication Interventions: Patient to call before getting OOB    Elimination Interventions: Call light in reach    History of Falls Interventions: Door open when patient unattended        Problem: Pressure Injury - Risk of  Goal: *Prevention of pressure injury  Document Bahman Scale and appropriate interventions in the flowsheet.    Outcome: Progressing Towards Goal  Pressure Injury Interventions:       Moisture Interventions: Absorbent underpads    Activity Interventions: Increase time out of bed    Mobility Interventions: HOB 30 degrees or less    Nutrition Interventions: Document food/fluid/supplement intake    Friction and Shear Interventions: HOB 30 degrees or less

## 2018-05-07 NOTE — PROGRESS NOTES
Report received on pts history and status from previous shift nurse. Assessment completed. Sitting up in chair at bedside, smiling. O2 on at 3 l/nc, no respiratory distress at rest. Voices of feeling cold and weakened at times.

## 2018-05-07 NOTE — PROGRESS NOTES
PHYSICAL THERAPY DAILY NOTE  Time In: 0831  Time Out: 1208  Patient Seen For: AM;Gait training;Patient education; Therapeutic exercise;Transfer training; Other (see progress notes)    Subjective: patient reporting she feels OK. Reports increased difficulty with sit to stand today. Objective:Vital Signs:patient on 02 at 3 lpm, 02 sat 86% after ambulating 50 ft. 02 sat improved to 90 % after 1 min rest  Patient Vitals for the past 12 hrs:   Temp Pulse Resp BP SpO2   05/07/18 0808 97.9 °F (36.6 °C) 83 16 125/77 92 %   05/07/18 0545 - - - - 94 %     Pain level:4 to 7 out of 10  Pain location:lateral aspect of both hands and right knee  Pain interventions:Pain medication per RN, rest, positioning,ROM for knee    Patient education:Balance training,transfer training, gait training, fall precautions, activity pacing, NWB L hand precautions, body mechanics, Patient verbalizing understanding and demonstrating partial understanding of patient education. Recommend follow up education.     Interdisciplinary Communication:NA    Other (comment) (fall precautions, NWB Left hand)  GROSS ASSESSMENT Daily Assessment     NA       BED/MAT MOBILITY Daily Assessment    Rolling Right : 0 (Not tested)  Rolling Left : 0 (Not tested)  Supine to Sit : 0 (Not tested)  Sit to Supine : 0 (Not tested)       TRANSFERS Daily Assessment    Transfer Type: SPT without device  Transfer Assistance : 5 (Stand-by assistance)  Sit to Stand Assistance: Minimal assistance (on frist attempt from recliner and w/c, SBA on 2nd and 3rd attempts from w/c)  Car Transfers: Not tested       GAIT Daily Assessment    Amount of Assistance: 5 (Supervision/setup)  Distance (ft): 50 Feet (ft) (50ft x 2 with 5 min rest break)  Assistive Device:  (No device)   Gait training with cues to manage 02 line and cues for improving depth of inspiration through her nose    STEPS or STAIRS Daily Assessment    Steps/Stairs Ambulated (#): 0  Level of Assist : 0 (Not tested) BALANCE Daily Assessment    Sitting - Static: Good (unsupported)  Sitting - Dynamic: Good (unsupported)  Standing - Static: Good  Standing - Dynamic : Impaired       WHEELCHAIR MOBILITY Daily Assessment    Curbs/Ramps Assist Required (FIM Score): 0 (Not tested)  Wheelchair Setup Assist Required : 0 (Not tested)       LOWER EXTREMITY EXERCISES Daily Assessment    Extremity: Both  Exercise Type #1: Other (comment) (LE motomed x 10 mins at level 2)  Level of Assist: Supervision          Assessment: Progressing towards goals slowly. Ongoing difficulty with sit to stand secondary to LE weakness and inability to utilize hands to assist with sit to stand. Functional endurance slow to improve due to respiratory status. Difficult to progress gait distance due to respiratory status       Patient returned to room at end of treatment and remained up in recliner with LEs elevated and with needs in reach. 02 at 3 lpm    Plan of Care: Continue with POC and progress as tolerated.      Zacarias Chavarria, PT  5/7/2018

## 2018-05-07 NOTE — PROGRESS NOTES
End Of Shift Functional Summary, Nursing          TOILETING:    Does patient need assist with adjusting pants up or down and/or pericare? yes  If yes, please indicate what the patient needs help with:  Pants up and down, pericare   Does the patient require extra time? yes  Does the patient require standby assistance? yes  Does the patient require contact guard assistance? no  Does the patient require more than one staff member for assistance? no      TOILET TRANSFER:    Pt requires minimal assistance. Does the patient require extra time? yes  Does the patient require contact guard assistance? no  Does the patient require more than one staff member for assistance? no      BLADDER:    Pt does not have a medina catheter that staff manages.  Pt does not take medication. Pt is continent of bladder and voids in toilet. Pt has had 0 bladder accidents during this shift.       BOWEL:  Pt does take medication.  Pt is continent of bowel and uses toilet. Pt has had 0 bowel accidents during this shift.      BED/CHAIR TRANSFER  Pt requires minimal assistance. Does the patient require extra time? yes  Does the patient require contact guard assistance? no  Does the patient require more than one staff member for assistance? no      Documentation reviewed and plan of care discussed/reviewed with patient, oncoming nurse and patient assistant during the shift.

## 2018-05-08 LAB
HCT VFR BLD AUTO: 28.2 % (ref 35.8–46.3)
HEMOCCULT STL QL: NEGATIVE
HGB BLD-MCNC: 9.2 G/DL (ref 11.7–15.4)
INR PPP: 2.7
PROTHROMBIN TIME: 28.6 SEC (ref 11.5–14.5)

## 2018-05-08 PROCEDURE — 94760 N-INVAS EAR/PLS OXIMETRY 1: CPT

## 2018-05-08 PROCEDURE — 97530 THERAPEUTIC ACTIVITIES: CPT

## 2018-05-08 PROCEDURE — 97110 THERAPEUTIC EXERCISES: CPT

## 2018-05-08 PROCEDURE — 99232 SBSQ HOSP IP/OBS MODERATE 35: CPT | Performed by: PHYSICAL MEDICINE & REHABILITATION

## 2018-05-08 PROCEDURE — 85610 PROTHROMBIN TIME: CPT | Performed by: PHYSICAL MEDICINE & REHABILITATION

## 2018-05-08 PROCEDURE — 74011250637 HC RX REV CODE- 250/637: Performed by: PHYSICAL MEDICINE & REHABILITATION

## 2018-05-08 PROCEDURE — 74011000258 HC RX REV CODE- 258: Performed by: PHYSICAL MEDICINE & REHABILITATION

## 2018-05-08 PROCEDURE — 77030027138 HC INCENT SPIROMETER -A

## 2018-05-08 PROCEDURE — 97535 SELF CARE MNGMENT TRAINING: CPT

## 2018-05-08 PROCEDURE — 65310000000 HC RM PRIVATE REHAB

## 2018-05-08 PROCEDURE — 97116 GAIT TRAINING THERAPY: CPT

## 2018-05-08 PROCEDURE — 36415 COLL VENOUS BLD VENIPUNCTURE: CPT | Performed by: PHYSICAL MEDICINE & REHABILITATION

## 2018-05-08 PROCEDURE — 97150 GROUP THERAPEUTIC PROCEDURES: CPT

## 2018-05-08 PROCEDURE — 85018 HEMOGLOBIN: CPT | Performed by: PHYSICAL MEDICINE & REHABILITATION

## 2018-05-08 PROCEDURE — 77010033678 HC OXYGEN DAILY

## 2018-05-08 PROCEDURE — 94640 AIRWAY INHALATION TREATMENT: CPT

## 2018-05-08 PROCEDURE — 74011000250 HC RX REV CODE- 250: Performed by: PHYSICAL MEDICINE & REHABILITATION

## 2018-05-08 RX ORDER — SODIUM CHLORIDE 450 MG/100ML
100 INJECTION, SOLUTION INTRAVENOUS CONTINUOUS
Status: DISCONTINUED | OUTPATIENT
Start: 2018-05-08 | End: 2018-05-11

## 2018-05-08 RX ADMIN — OXYCODONE HYDROCHLORIDE 10 MG: 5 TABLET ORAL at 21:27

## 2018-05-08 RX ADMIN — FERROUS SULFATE TAB 325 MG (65 MG ELEMENTAL FE) 325 MG: 325 (65 FE) TAB at 17:09

## 2018-05-08 RX ADMIN — ESCITALOPRAM 10 MG: 10 TABLET, FILM COATED ORAL at 08:13

## 2018-05-08 RX ADMIN — SODIUM CHLORIDE 100 ML/HR: 450 INJECTION, SOLUTION INTRAVENOUS at 09:46

## 2018-05-08 RX ADMIN — OXYCODONE HYDROCHLORIDE 10 MG: 5 TABLET ORAL at 08:12

## 2018-05-08 RX ADMIN — OXYCODONE HYDROCHLORIDE 10 MG: 5 TABLET ORAL at 15:04

## 2018-05-08 RX ADMIN — ALBUTEROL SULFATE 2.5 MG: 2.5 SOLUTION RESPIRATORY (INHALATION) at 17:38

## 2018-05-08 RX ADMIN — BUDESONIDE 500 MCG: 0.5 INHALANT RESPIRATORY (INHALATION) at 05:56

## 2018-05-08 RX ADMIN — SENNOSIDES 8.6 MG: 8.6 TABLET, FILM COATED ORAL at 08:13

## 2018-05-08 RX ADMIN — ALBUTEROL SULFATE 2.5 MG: 2.5 SOLUTION RESPIRATORY (INHALATION) at 05:56

## 2018-05-08 RX ADMIN — LORAZEPAM 1 MG: 1 TABLET ORAL at 21:27

## 2018-05-08 RX ADMIN — WARFARIN SODIUM 5 MG: 2.5 TABLET ORAL at 17:15

## 2018-05-08 RX ADMIN — SODIUM CHLORIDE 100 ML/HR: 450 INJECTION, SOLUTION INTRAVENOUS at 20:35

## 2018-05-08 RX ADMIN — ALBUTEROL SULFATE 2.5 MG: 2.5 SOLUTION RESPIRATORY (INHALATION) at 12:22

## 2018-05-08 RX ADMIN — FUROSEMIDE 60 MG: 40 TABLET ORAL at 08:13

## 2018-05-08 RX ADMIN — CARVEDILOL 3.12 MG: 3.12 TABLET, FILM COATED ORAL at 08:13

## 2018-05-08 RX ADMIN — SIMVASTATIN 20 MG: 20 TABLET, FILM COATED ORAL at 21:27

## 2018-05-08 RX ADMIN — CARVEDILOL 3.12 MG: 3.12 TABLET, FILM COATED ORAL at 17:09

## 2018-05-08 RX ADMIN — POLYETHYLENE GLYCOL (3350) 17 G: 17 POWDER, FOR SOLUTION ORAL at 08:12

## 2018-05-08 RX ADMIN — FUROSEMIDE 60 MG: 40 TABLET ORAL at 17:09

## 2018-05-08 RX ADMIN — TIOTROPIUM BROMIDE 18 MCG: 18 CAPSULE ORAL; RESPIRATORY (INHALATION) at 05:57

## 2018-05-08 RX ADMIN — FERROUS SULFATE TAB 325 MG (65 MG ELEMENTAL FE) 325 MG: 325 (65 FE) TAB at 08:13

## 2018-05-08 RX ADMIN — BUDESONIDE 500 MCG: 0.5 INHALANT RESPIRATORY (INHALATION) at 17:38

## 2018-05-08 NOTE — PROGRESS NOTES
PHYSICAL THERAPY DAILY NOTE  Time In: 8833  Time Out: 8702  Patient Seen For: AM;Balance activities;Gait training;Patient education;Transfer training; Other (see progress notes)    Subjective: Patient reporting she feels better today. Reports she feels stronger since getting blood transfusion         Objective:Vital Signs:02 sat 87 % after ambulating 60ft on 02 at 3 lpm. 02 sat 89% after ambulating up 4 steps  Patient Vitals for the past 12 hrs:   Temp Pulse Resp BP SpO2   05/08/18 0800 98.8 °F (37.1 °C) 77 18 138/64 92 %   05/08/18 0559 - - - - 96 %   05/07/18 2310 - - - - 96 %     Pain level:  Pain location:  Pain interventions:    Patient education:energy conservation,transfer training, gait training, fall precautions, activity pacing, body mechanics,breathing techniques during functional mobility, Patient verbalizing understanding and demonstrating partial understanding of patient education. Recommend follow up education. Interdisciplinary Communication:NA    Other (comment) (fall precautions, NWB Left hand)  GROSS ASSESSMENT Daily Assessment     Issued patient incentive spirometer and instructed patient on how to use spirometer. Patient demonstrated understanding       BED/MAT MOBILITY Daily Assessment    Rolling Right : 0 (Not tested)  Rolling Left : 0 (Not tested)  Supine to Sit : 0 (Not tested)  Sit to Supine : 0 (Not tested)       TRANSFERS Daily Assessment   Increased time and effort to complete with cues for body mechanics   Transfer Type: SPT without device  Transfer Assistance : 5 (Supervision/setup)  Sit to Stand Assistance: Stand-by assistance  Car Transfers: Not tested       GAIT Daily Assessment    Amount of Assistance: 5 (Supervision/setup)  Distance (ft): 60 Feet (ft) x 2 with 5 minute sitting rest break  Assistive Device:  (No device)   Gait training with cues to managing 02 line and for improved depth of inspiration.     Gait training up/down 10 ft ramp with SBA    STEPS or STAIRS Daily Assessment   Slow single step at a time leading up with LLE and down with RLE. 4 min sitting rest break after going up steps. PT supporting under patient's forearms with no weight bearing through hands going up/down steps Steps/Stairs Ambulated (#): 4  Level of Assist : 1 (Dependent/total care) (min assist x 2)  Rail Use:  (None)       BALANCE Daily Assessment    Sitting - Static: Good (unsupported)  Sitting - Dynamic: Good (unsupported)  Standing - Static: Good  Standing - Dynamic : Impaired       WHEELCHAIR MOBILITY Daily Assessment    Curbs/Ramps Assist Required (FIM Score): 0 (Not tested)  Wheelchair Setup Assist Required : 0 (Not tested)       LOWER EXTREMITY EXERCISES Daily Assessment     NA          Assessment: Improved sit to stand this AM. Cont to demonstrate altered body mechanics with excessive trunk flexion with sit to stand due to LE weakness and inability to use her hands to assist with sit to stand       Patient returned to room at end of treatment and remained up in recliner with LEs elevated and with needs in reach. 02 at 3 lpm    Plan of Care: Continue with POC and progress as tolerated.      Micheline Peña, PT  5/8/2018

## 2018-05-08 NOTE — PROGRESS NOTES
Brianna Anthony MD,   Medical Director  [address] [facility], [address] [facility]  Tel: [phone]       [facility] PROGRESS NOTE    Matthew Hernandez  Admit Date: 5/2/2018  Admit Diagnosis: Debillity ; Physical debility    Subjective     Pain right knee. \" my legs are slows to go today. \"  No cp, +hill but no change.  Good spirits    Objective:     Current Facility-Administered Medications   Medication Dose Route Frequency    0.45% sodium chloride infusion  100 mL/hr IntraVENous CONTINUOUS    furosemide (LASIX) tablet 60 mg  60 mg Oral BID    0.9% sodium chloride infusion 250 mL  250 mL IntraVENous PRN    diphenhydrAMINE (BENADRYL) capsule 25 mg  25 mg Oral Q6H PRN    epoetin jairo (EPOGEN;PROCRIT) injection 4,000 Units  4,000 Units SubCUTAneous Q7D    ferrous sulfate tablet 325 mg  1 Tab Oral BID WITH MEALS    0.9% sodium chloride infusion 250 mL  250 mL IntraVENous PRN    0.9% sodium chloride infusion 250 mL  250 mL IntraVENous PRN    acetaminophen (TYLENOL) tablet 650 mg  650 mg Oral Q4H PRN    bisacodyl (DULCOLAX) tablet 5 mg  5 mg Oral DAILY PRN    diphenhydrAMINE (BENADRYL) capsule 25 mg  25 mg Oral Q4H PRN    LORazepam (ATIVAN) tablet 1 mg  1 mg Oral BID PRN    naloxone (NARCAN) injection 0.4 mg  0.4 mg IntraVENous PRN    prochlorperazine (COMPAZINE) injection 5 mg  5 mg IntraVENous Q8H PRN    sodium chloride (NS) flush 5-10 mL  5-10 mL IntraVENous PRN    albuterol (PROVENTIL VENTOLIN) nebulizer solution 2.5 mg  2.5 mg Nebulization Q4H PRN    bisacodyl (DULCOLAX) suppository 10 mg  10 mg Rectal DAILY PRN    budesonide (PULMICORT) 500 mcg/2 ml nebulizer suspension  500 mcg Nebulization BID RT    And    albuterol (PROVENTIL VENTOLIN) nebulizer solution 2.5 mg  2.5 mg Nebulization Q6HWA RT    carvedilol (COREG) tablet 3.125 mg  3.125 mg Oral BID WITH MEALS    escitalopram oxalate (LEXAPRO) tablet 10 mg  10 mg Oral DAILY    loratadine (CLARITIN) tablet 10 mg  10 mg Oral DAILY PRN    oxyCODONE IR (ROXICODONE) tablet 10 mg  10 mg Oral Q4H PRN    polyethylene glycol (MIRALAX) packet 17 g  17 g Oral DAILY    sacubitril-valsartan (ENTRESTO) 24-26 mg tablet 1 Tab  1 Tab Oral BID    senna (SENOKOT) tablet 8.6 mg  1 Tab Oral DAILY    simvastatin (ZOCOR) tablet 20 mg  20 mg Oral QHS    tiotropium (SPIRIVA) inhalation capsule 18 mcg  1 Cap Inhalation DAILY    traMADol (ULTRAM) tablet 50 mg  50 mg Oral Q6H PRN    warfarin (COUMADIN) tablet 2.5 mg  2.5 mg Oral Once per day on Mon Fri    warfarin (COUMADIN) tablet 5 mg  5 mg Oral Once per day on Sun Tue Wed Thu Sat     Review of Systems:Denies chest pain, shortness of breath, cough, headache, visual problems, abdominal pain, dysurea, calf pain. Pertinent positives are as noted in the medical records and unremarkable otherwise. Visit Vitals    /66 (BP 1 Location: Right arm, BP Patient Position: At rest)    Pulse 75    Temp 98.5 °F (36.9 °C)    Resp 18    Ht 5' 2\" (1.575 m)    Wt 223 lb (101.2 kg)    SpO2 96%    Breastfeeding No    BMI 40.79 kg/m2        Physical Exam:   General: Alert and age appropriately oriented. No acute cardio respiratory distress. HEENT: Normocephalic,no scleral icterus  Oral mucosa moist without cyanosis   Lungs: Clear to auscultation  bilaterally. Respiration even and unlabored   Heart: Regular rate and rhythm, S1, S2   No  murmurs, clicks, rub or gallops   Abdomen: Soft, non-tender, nondistended. Bowel sounds present. No organomegaly. obese   Genitourinary: defer . Neuromuscular:      NT; in bathroom in wc with therapy   Skin/extremity: No rashes, no erythema.  No calf tenderness BLE  No edema                                                                            Functional Assessment:          Balance  Sitting - Static: Good (unsupported) (05/07/18 1300)  Sitting - Dynamic: Good (unsupported) (05/07/18 1300)  Standing - Static: Good (05/07/18 1300)  Standing - Dynamic : Impaired (05/07/18 1300)       Feeding  Adaptive Equipment: Built up spoon;Plate guard;Built up fork;Non-skid surface (05/07/18 0823)             Pato Rich Fall Risk Assessment:  Pato Rich Fall Risk  Mobility: Ambulates or transfers with assist devices or assistance (05/07/18 2033)  Mobility Interventions: Utilize walker, cane, or other assitive device (05/07/18 2033)  Mentation: Alert, oriented x 3 (05/07/18 2033)  Mentation Interventions: Door open when patient unattended (05/07/18 2033)  Medication: Patient receiving anticonvulsants, sedatives(tranquilizers), psychotropics or hypnotics, hypoglycemics, narcotics, sleep aids, antihypertensives, laxatives, or diuretics (05/07/18 2033)  Medication Interventions: Patient to call before getting OOB (05/07/18 2033)  Elimination: Needs assistance with toileting (05/07/18 2033)  Elimination Interventions: Call light in reach (05/07/18 2033)  Prior Fall History: Before admission in past 12 months _home or previous inpatient care) (05/07/18 2033)  History of Falls Interventions: Door open when patient unattended (05/07/18 2033)  Total Score: 4 (05/07/18 2033)  Standard Fall Precautions: Yes (05/03/18 2239)  High Fall Risk: Yes (05/07/18 2033)     Speech Assessment:         Ambulation:  Gait  Distance (ft): 10 Feet (ft) (10ft x 2  recliner to w/c and w/c to bed) (05/07/18 1300)  Assistive Device:  (No device) (05/07/18 1300)     Labs/Studies:  Recent Results (from the past 72 hour(s))   PROTHROMBIN TIME + INR    Collection Time: 05/06/18  6:39 AM   Result Value Ref Range    Prothrombin time 29.2 (H) 11.5 - 14.5 sec    INR 2.8     HGB & HCT    Collection Time: 05/06/18  6:39 AM   Result Value Ref Range    HGB 8.1 (L) 11.7 - 15.4 g/dL    HCT 25.9 (L) 35.8 - 46.3 %   PROTHROMBIN TIME + INR    Collection Time: 05/07/18  7:07 AM   Result Value Ref Range    Prothrombin time 28.9 (H) 11.5 - 14.5 sec    INR 2.8     METABOLIC PANEL, BASIC    Collection Time: 05/07/18  7:07 AM   Result Value Ref Range    Sodium 140 136 - 145 mmol/L    Potassium 4.5 3.5 - 5.1 mmol/L    Chloride 99 98 - 107 mmol/L    CO2 34 (H) 21 - 32 mmol/L    Anion gap 7 7 - 16 mmol/L    Glucose 128 (H) 65 - 100 mg/dL    BUN 73 (H) 8 - 23 MG/DL    Creatinine 1.99 (H) 0.6 - 1.0 MG/DL    GFR est AA 32 (L) >60 ml/min/1.73m2    GFR est non-AA 26 (L) >60 ml/min/1.73m2    Calcium 9.4 8.3 - 10.4 MG/DL   CBC W/O DIFF    Collection Time: 05/07/18  7:07 AM   Result Value Ref Range    WBC 7.2 4.3 - 11.1 K/uL    RBC 2.80 (L) 4.05 - 5.25 M/uL    HGB 8.3 (L) 11.7 - 15.4 g/dL    HCT 25.6 (L) 35.8 - 46.3 %    MCV 91.4 79.6 - 97.8 FL    MCH 29.6 26.1 - 32.9 PG    MCHC 32.4 31.4 - 35.0 g/dL    RDW 14.5 11.9 - 14.6 %    PLATELET 630 253 - 960 K/uL    MPV 9.8 (L) 10.8 - 14.1 FL   URINALYSIS W/ RFLX MICROSCOPIC    Collection Time: 05/07/18 10:42 AM   Result Value Ref Range    Color YELLOW      Appearance CLEAR      Specific gravity 1.011 1.001 - 1.023      pH (UA) 6.0 5.0 - 9.0      Protein NEGATIVE  NEG mg/dL    Glucose NEGATIVE  mg/dL    Ketone NEGATIVE  NEG mg/dL    Bilirubin NEGATIVE  NEG      Blood NEGATIVE  NEG      Urobilinogen 1.0 0.2 - 1.0 EU/dL    Nitrites NEGATIVE  NEG      Leukocyte Esterase NEGATIVE  NEG     TYPE + CROSSMATCH    Collection Time: 05/07/18 11:09 AM   Result Value Ref Range    Crossmatch Expiration 05/10/2018     ABO/Rh(D) A POSITIVE     Antibody screen NEG     Unit number [id number]     Blood component type RC LRIR     Unit division 00     Status of unit ISSUED     Crossmatch result Compatible    PROTHROMBIN TIME + INR    Collection Time: 05/08/18  6:14 AM   Result Value Ref Range    Prothrombin time 28.6 (H) 11.5 - 14.5 sec    INR 2.7     HGB & HCT    Collection Time: 05/08/18  6:14 AM   Result Value Ref Range    HGB 9.2 (L) 11.7 - 15.4 g/dL    HCT 28.2 (L) 35.8 - 46.3 %       Assessment:     Problem List as of 5/8/2018  Date Reviewed: 4/27/2018          Codes Class Noted - Resolved    * (Principal)Physical debility ICD-10-CM: R53.81  ICD-9-CM: 799.3  5/2/2018 - Present        Closed nondisplaced fracture of shaft of fifth metacarpal bone of left hand ICD-10-CM: S62.357A  ICD-9-CM: 815.03  4/28/2018 - Present        Subdural hematoma (HCC) ICD-10-CM: I62.00  ICD-9-CM: 432.1  4/27/2018 - Present        Long term (current) use of anticoagulants (Chronic) ICD-10-CM: Z79.01  ICD-9-CM: V58.61  4/19/2018 - Present        Dysthymia ICD-10-CM: F34.1  ICD-9-CM: 300.4  4/5/2018 - Present        Type 2 diabetes mellitus with nephropathy (HCC) (Chronic) ICD-10-CM: E11.21  ICD-9-CM: 250.40, 583.81  12/18/2017 - Present        Pulmonary hypertension (HCC) (Chronic) ICD-10-CM: I27.20  ICD-9-CM: 416.8  6/15/2016 - Present        S/P AVR (aortic valve replacement) (Chronic) ICD-10-CM: Z95.2  ICD-9-CM: V43.3  Unknown - Present    Overview Signed 2/23/2016 11:04 AM by Rubia Valencia     Mechanical/Artific             Cardiomyopathy ([facility] [address] (Chronic) ICD-10-CM: I42.9  ICD-9-CM: 425.4  Unknown - Present        Osteopenia ICD-10-CM: M85.80  ICD-9-CM: 733.90  Unknown - Present        HLD (hyperlipidemia) (Chronic) ICD-10-CM: E78.5  ICD-9-CM: 272.4  Unknown - Present        Osteoarthritis ICD-10-CM: M19.90  ICD-9-CM: 715.90  Unknown - Present        ICD (implantable cardioverter-defibrillator) in place ICD-10-CM: Z95.810  ICD-9-CM: V45.02  10/2/2014 - Present    Overview Signed 10/2/2014  4:58 PM by Dawson Dejesus single-chamber ICD implantation 10/20/14             Diabetes mellitus type 2, diet-controlled ([facility] [address] (Chronic) ICD-10-CM: E11.9  ICD-9-CM: 250.00  8/28/2014 - Present        COPD (chronic obstructive pulmonary disease) ([facility] [address] (Chronic) ICD-10-CM: J44.9  ICD-9-CM: 140  4/2/2014 - Present        REJI (obstructive sleep apnea)-cpap (Chronic) ICD-10-CM: G47.33  ICD-9-CM: 327.23  4/2/2014 - Present        CKD (chronic kidney disease) stage 3, GFR 30-59 ml/min (Chronic) ICD-10-CM: N18.3  ICD-9-CM: 585.3 7/10/2013 - Present        Anticoagulated on Coumadin (Chronic) ICD-10-CM: Z51.81, Z79.01  ICD-9-CM: V58.83, V58.61  7/9/2013 - Present    Overview Signed 7/9/2013  4:16 PM by Eliu Renae NP     S/P AVR             CAD (coronary artery disease) (Chronic) ICD-10-CM: I25.10  ICD-9-CM: 414.00  1/20/2013 - Present    Overview Signed 5/20/2014 10:05 AM by Gardiner Cockayne, NP     5/8/14 PCI LAD with stent placed             Chronic combined systolic and diastolic heart failure (HCC) (Chronic) ICD-10-CM: I50.42  ICD-9-CM: 428.42  1/20/2013 - Present    Overview Addendum 4/28/2018  4:53 AM by Coby Cockayne, MD     5/8/14 ECHO:  EF 10-15%  12/2017:  EF 25-30%             Essential hypertension, benign (Chronic) ICD-10-CM: I10  ICD-9-CM: 401.1  1/20/2013 - Present        MDS (myelodysplastic syndrome) (HCC) (Chronic) ICD-10-CM: D46.9  ICD-9-CM: 238.75  12/17/2011 - Present    Overview Addendum 8/29/2013 11:06 AM by Leatha Ashraf started in August, 2011 12/18/11 Procrit weekly and Iron stores. 5-12         12-13-12 good response to 3 weekly procrit did not need it last time    3-7-13 Pt doing well. Just wanted a \"check-up. \" Responding to Procrit every three weeks.   8-29-13 patient has missed some injections on recently restarted hemoglobin only issue , takes oral iron iron stores each time                Iron deficiency anemia due to chronic blood loss (Chronic) ICD-10-CM: D50.0  ICD-9-CM: 280.0  7/29/2009 - Present        RESOLVED: CHF (congestive heart failure) ([facility] ICD-10-CM: I50.9  ICD-9-CM: 428.0  2/17/2017 - 4/27/2018        RESOLVED: Routine general medical examination at a health care facility ICD-10-CM: Z00.00  ICD-9-CM: V70.0  12/16/2016 - 4/27/2018        RESOLVED: Chest pain, unspecified ICD-10-CM: R07.9  ICD-9-CM: 786.50  12/7/2016 - 12/16/2016        RESOLVED: Chronic left-sided low back pain without sciatica ICD-10-CM: M54.5, G89.29  ICD-9-CM: 724.2, 338.29  6/15/2016 - 4/27/2018 RESOLVED: Tachycardia ICD-10-CM: R00.0  ICD-9-CM: 785.0  Unknown - 12/16/2016    Overview Signed 2/23/2016 11:02 AM by Emilie Marc H/B              RESOLVED: Chronic respiratory failure (HCC) (Chronic) ICD-10-CM: J96.10  ICD-9-CM: 518.83  4/2/2014 - 4/27/2018              Plan:   S/p fall with bilateral hand fxs, right knee sprain, FELIZ on CKD, anemia with underlying dx of MDS; physical debility       Continue daily physician medical management:  Pneumonia prophylaxis- Incentive spirometer every hour while awake      Digit fxs both hands; cont splint/rachel tape; only restriction is no lifting; has chronic RUE weakness from what I suspect is a RTC injury; xray neg; OT working on modalities      COPD/REJI; pt is on Trilogy at [facility]. She manages this at home but having difficulty do to fxs in her hands  -cont Proventil , Pulmicort; Spiriva;  try weaning daytime O2; currently 4-6L due to hypoxia. Doesn't use during day at home per pt; 5/3 NOW reports chronic 3L at home, thus at baseline  -5/3 down from 4 to 2L , sats 98%; 5/4 on 3L  sats 96% (obviously needs due to drop noted to 65% sats when O2 accidentally not hooked up last noc and pt symptomatic with chest pain which resolved immediately when placed back on O2  -5/5 comfortable. No PRUETT, on home 3L NC, lowest sat 91%      Severe CHF/cardiomyopathy; compensated currently; cont Coreg, Lasix; has an ICD ; on entresto as well, 5/3  -5/3 no evidence of acute issues; 5/4 compensated well and considering dec Lasix  -5/5 recheck renal fx, may dec lasix      DVT risk / DVT Prophylaxis- Will require daily physician exam to assess for signs and symptoms as patient is at increased risk for of thromboembolism. Mobilization as tolerated. Intermittent pneumatic compression devices when in bed Thigh-high or knee-high thromboembolic deterrent hose when out of bed.  On therapeutic Coumadin       Pain Control: ongoing significant pain in back, shoulders, knees and hands which is stable and controlled by PRN meds. Will require regular pain assessment and comprenhensive pain management,   -has end stage OA of the knees and now with right knee sprain; ice/ace wrap, modalities      Wound Care: Monitor wound status daily per staff and physician. At risk for failure. Will require 24/7 rehab nursing. None needed but at risk due to immobility   -5/3 hematoma dorsum right hand; warm compress      Hypertension - BP uncontrolled, fluctuating, managed medically. Has hypotension, asymptomatic, parameters with meds   -5/3 119-141 SBP, controlled; 5/4 /66 -; on Coreg, Lasix and Entresto; CHF well compensated; consider decreasing lasix which is dosed at 80mg bid  5/5 bp 141/60; 5/6 117/78       Scleral hemorrhage; clinically unchanged but less periocular swelling      MDS/chronic anemia; monitor , replace as needed, consider epogen  -5/3 hgb stable at 8.4; 5/4 consider dose of epogen but I believe there are restrictions to ordering and may need hematology, will increase iron due to deficiency; recheck 5/6; 8.1  -consult hematology Mond if labs concerning; looking back at past notes; was on weekly procrit if hgb < 10; will order      FELIZ on CKD; improving, monitor labs and adjust meds; daily wt due to CHF  -creat slowly improving 5/3; 1.53 from 1.71 yesterday, BUN remains unchanged, 50's; monitor labs 3x/wk  -5/7 creat up; push fluids and consider holding entresto and dec lasix      Chronic coumadin therapy due to mech AVR; goal INR 2-3; inr 2.9; monitor and have pharmacy assist in dosing;   5/4 INR therap at 2.7 on home alternating doses of coumadin (2.5mg M/F and 5mg S/Tue/W/TH/SAT)      ? Hx of DM listed but on no medications ; do have on diet restrictions; carbohydrate restrictions      Urinary retention/ neurogenic bladder - schedule voids q6-8 hrs. Check post-void residual as needed;  In and Out catheterize if post-void residual is more than 400 cc.  -5/4 no issues      bowel program - constipation, added bowel program; 5/4 continent; no const/diarr      GERD - resume PPI. At times may need additional antacids, Maalox prn.                Time spent was 25 minutes with over 1/2 in direct patient care/examination, consultation and coordination of care.      Signed By: Gorod Marcum MD     May 8, 2018

## 2018-05-08 NOTE — PROGRESS NOTES
05/08/18 1014   Time Spent With Patient   Time In 0700   Time Out 0746   Patient Seen For: AM;ADLs   Feeding/Eating   Feeding/Eating Assistance SBA   Adaptive Equipment Non-skid surface;Built up spoon;Plate guard   Comments continues to work well with equipement and improve   Grooming   Grooming Assistance  Min A   Upper Body Bathing   Bathing Assistance, Upper Mod A   Lower Body Bathing   Bathing Assistance, Lower  Mod A   Position Performed Seated in chair;Standing   Comments able to complete sitting on tub bench in shower   Upper [address]  Max A   Lower Body Dressing    Dressing Assistance  Max A   Comments able to help some over waist.    Functional Transfers   Tub or Shower Type Shower   Amount of Assistance Required Mod A   Adaptive Equipment Grab bars; Tub transfer bench     S: \"I think I will shower today. \" Agreeable to therapy. Focus of session was on morning ADL routine. Patient was able to ambulate ~20 feet using a HHA with minimal assist. Significantly improve sit to stand from last ADL. Pain tolerated during session, right shoulder still largest barrier for pain. Collaborated with PT, Onesimo Ybarra and confirmed patient is on track to reach goals as documented in the care plan. Patient tolerated session well, but strength, balance, activity tolerance, pain, ROM are still below baseline and requires skilled facilitation to successfully and safely complete ADL's and transfers. Patient ended session in recliner with call remote and phone within reach.      Tod Doty, OTR

## 2018-05-08 NOTE — PROGRESS NOTES
Holden Abebe MD,   Medical Director  [address] [facility], [address] [facility]  Tel: [phone]       [facility] PROGRESS NOTE    Cherylene Founds  Admit Date: 5/2/2018  Admit Diagnosis: Debillity ; Physical debility    Subjective     No complaints. Trilogy at [facility]; tolerates well. No cp, sob, n. Voiding well.  Denies dysuria but her urine smells awful, to me     Objective:     Current Facility-Administered Medications   Medication Dose Route Frequency    0.45% sodium chloride infusion  100 mL/hr IntraVENous CONTINUOUS    furosemide (LASIX) tablet 60 mg  60 mg Oral BID    0.9% sodium chloride infusion 250 mL  250 mL IntraVENous PRN    diphenhydrAMINE (BENADRYL) capsule 25 mg  25 mg Oral Q6H PRN    epoetin jairo (EPOGEN;PROCRIT) injection 4,000 Units  4,000 Units SubCUTAneous Q7D    ferrous sulfate tablet 325 mg  1 Tab Oral BID WITH MEALS    0.9% sodium chloride infusion 250 mL  250 mL IntraVENous PRN    0.9% sodium chloride infusion 250 mL  250 mL IntraVENous PRN    acetaminophen (TYLENOL) tablet 650 mg  650 mg Oral Q4H PRN    bisacodyl (DULCOLAX) tablet 5 mg  5 mg Oral DAILY PRN    diphenhydrAMINE (BENADRYL) capsule 25 mg  25 mg Oral Q4H PRN    LORazepam (ATIVAN) tablet 1 mg  1 mg Oral BID PRN    naloxone (NARCAN) injection 0.4 mg  0.4 mg IntraVENous PRN    prochlorperazine (COMPAZINE) injection 5 mg  5 mg IntraVENous Q8H PRN    sodium chloride (NS) flush 5-10 mL  5-10 mL IntraVENous PRN    albuterol (PROVENTIL VENTOLIN) nebulizer solution 2.5 mg  2.5 mg Nebulization Q4H PRN    bisacodyl (DULCOLAX) suppository 10 mg  10 mg Rectal DAILY PRN    budesonide (PULMICORT) 500 mcg/2 ml nebulizer suspension  500 mcg Nebulization BID RT    And    albuterol (PROVENTIL VENTOLIN) nebulizer solution 2.5 mg  2.5 mg Nebulization Q6HWA RT    carvedilol (COREG) tablet 3.125 mg  3.125 mg Oral BID WITH MEALS    escitalopram oxalate (LEXAPRO) tablet 10 mg  10 mg Oral DAILY  loratadine (CLARITIN) tablet 10 mg  10 mg Oral DAILY PRN    oxyCODONE IR (ROXICODONE) tablet 10 mg  10 mg Oral Q4H PRN    polyethylene glycol (MIRALAX) packet 17 g  17 g Oral DAILY    sacubitril-valsartan (ENTRESTO) 24-26 mg tablet 1 Tab  1 Tab Oral BID    senna (SENOKOT) tablet 8.6 mg  1 Tab Oral DAILY    simvastatin (ZOCOR) tablet 20 mg  20 mg Oral QHS    tiotropium (SPIRIVA) inhalation capsule 18 mcg  1 Cap Inhalation DAILY    traMADol (ULTRAM) tablet 50 mg  50 mg Oral Q6H PRN    warfarin (COUMADIN) tablet 2.5 mg  2.5 mg Oral Once per day on Mon Fri    warfarin (COUMADIN) tablet 5 mg  5 mg Oral Once per day on Sun Tue Wed Thu Sat     Review of Systems:Denies chest pain, shortness of breath, cough, headache, visual problems, abdominal pain, dysurea, calf pain. Pertinent positives are as noted in the medical records and unremarkable otherwise.   + PRUETT  Visit Vitals    /66 (BP 1 Location: Right arm, BP Patient Position: At rest)    Pulse 75    Temp 98.5 °F (36.9 °C)    Resp 18    Ht 5' 2\" (1.575 m)    Wt 223 lb (101.2 kg)    SpO2 96%    Breastfeeding No    BMI 40.79 kg/m2        Physical Exam:   General: Alert and age appropriately oriented. No acute cardio respiratory distress. HEENT: Normocephalic,no scleral icterus; conjunctival hemorrh on the left unchange, periorbital echym on the left. EOMI  Oral mucosa moist without cyanosis   Lungs: Clear to auscultation  bilaterally. Respiration even and unlabored   Heart: Regular rate and rhythm, S1, S2   No  murmurs, clicks, rub or gallops   Abdomen: Soft, non-tender, nondistended. Bowel sounds present. No organomegaly. obese   Genitourinary: defer   Neuromuscular:      Generalized weakness, right knee pain limits mobility and right shoulder weakness is chronic   Skin/extremity: No rashes, no erythema. No calf tenderness BLE  No edema.  Still with significant bruising right knee Functional Assessment:          Balance  Sitting - Static: Good (unsupported) (05/07/18 1300)  Sitting - Dynamic: Good (unsupported) (05/07/18 1300)  Standing - Static: Good (05/07/18 1300)  Standing - Dynamic : Impaired (05/07/18 1300)       Feeding  Adaptive Equipment: Built up spoon;Plate guard;Built up fork;Non-skid surface (05/07/18 0823)             Orvel Buffy Fall Risk Assessment:  [facility]  Mobility: Ambulates or transfers with assist devices or assistance (05/07/18 2033)  Mobility Interventions: Utilize walker, cane, or other assitive device (05/07/18 2033)  Mentation: Alert, oriented x 3 (05/07/18 2033)  Mentation Interventions: Door open when patient unattended (05/07/18 2033)  Medication: Patient receiving anticonvulsants, sedatives(tranquilizers), psychotropics or hypnotics, hypoglycemics, narcotics, sleep aids, antihypertensives, laxatives, or diuretics (05/07/18 2033)  Medication Interventions: Patient to call before getting OOB (05/07/18 2033)  Elimination: Needs assistance with toileting (05/07/18 2033)  Elimination Interventions: Call light in reach (05/07/18 2033)  Prior Fall History: Before admission in past 12 months _home or previous inpatient care) (05/07/18 2033)  History of Falls Interventions: Door open when patient unattended (05/07/18 2033)  Total Score: 4 (05/07/18 2033)  Standard Fall Precautions: Yes (05/03/18 2239)  High Fall Risk: Yes (05/07/18 2033)     Speech Assessment:         Ambulation:  Gait  Distance (ft): 10 Feet (ft) (10ft x 2  recliner to w/c and w/c to bed) (05/07/18 1300)  Assistive Device:  (No device) (05/07/18 1300)     Labs/Studies:  Recent Results (from the past 72 hour(s))   PROTHROMBIN TIME + INR    Collection Time: 05/06/18  6:39 AM   Result Value Ref Range    Prothrombin time 29.2 (H) 11.5 - 14.5 sec    INR 2.8     HGB & HCT    Collection Time: 05/06/18  6:39 AM   Result Value Ref Range    HGB 8.1 (L) 11.7 - 15.4 g/dL    HCT 25.9 (L) 35.8 - 46.3 %   PROTHROMBIN TIME + INR    Collection Time: 05/07/18  7:07 AM   Result Value Ref Range    Prothrombin time 28.9 (H) 11.5 - 14.5 sec    INR 2.8     METABOLIC PANEL, BASIC    Collection Time: 05/07/18  7:07 AM   Result Value Ref Range    Sodium 140 136 - 145 mmol/L    Potassium 4.5 3.5 - 5.1 mmol/L    Chloride 99 98 - 107 mmol/L    CO2 34 (H) 21 - 32 mmol/L    Anion gap 7 7 - 16 mmol/L    Glucose 128 (H) 65 - 100 mg/dL    BUN 73 (H) 8 - 23 MG/DL    Creatinine 1.99 (H) 0.6 - 1.0 MG/DL    GFR est AA 32 (L) >60 ml/min/1.73m2    GFR est non-AA 26 (L) >60 ml/min/1.73m2    Calcium 9.4 8.3 - 10.4 MG/DL   CBC W/O DIFF    Collection Time: 05/07/18  7:07 AM   Result Value Ref Range    WBC 7.2 4.3 - 11.1 K/uL    RBC 2.80 (L) 4.05 - 5.25 M/uL    HGB 8.3 (L) 11.7 - 15.4 g/dL    HCT 25.6 (L) 35.8 - 46.3 %    MCV 91.4 79.6 - 97.8 FL    MCH 29.6 26.1 - 32.9 PG    MCHC 32.4 31.4 - 35.0 g/dL    RDW 14.5 11.9 - 14.6 %    PLATELET 009 842 - 700 K/uL    MPV 9.8 (L) 10.8 - 14.1 FL   URINALYSIS W/ RFLX MICROSCOPIC    Collection Time: 05/07/18 10:42 AM   Result Value Ref Range    Color YELLOW      Appearance CLEAR      Specific gravity 1.011 1.001 - 1.023      pH (UA) 6.0 5.0 - 9.0      Protein NEGATIVE  NEG mg/dL    Glucose NEGATIVE  mg/dL    Ketone NEGATIVE  NEG mg/dL    Bilirubin NEGATIVE  NEG      Blood NEGATIVE  NEG      Urobilinogen 1.0 0.2 - 1.0 EU/dL    Nitrites NEGATIVE  NEG      Leukocyte Esterase NEGATIVE  NEG     TYPE + CROSSMATCH    Collection Time: 05/07/18 11:09 AM   Result Value Ref Range    Crossmatch Expiration 05/10/2018     ABO/Rh(D) A POSITIVE     Antibody screen NEG     Unit number [id number]     Blood component type RC LRIR     Unit division 00     Status of unit ISSUED     Crossmatch result Compatible    PROTHROMBIN TIME + INR    Collection Time: 05/08/18  6:14 AM   Result Value Ref Range    Prothrombin time 28.6 (H) 11.5 - 14.5 sec    INR 2.7     HGB & HCT    Collection Time: 05/08/18  6:14 AM   Result Value Ref Range    HGB 9.2 (L) 11.7 - 15.4 g/dL    HCT 28.2 (L) 35.8 - 46.3 %       Assessment:     Problem List as of 5/8/2018  Date Reviewed: 4/27/2018          Codes Class Noted - Resolved    * (Principal)Physical debility ICD-10-CM: R53.81  ICD-9-CM: 799.3  5/2/2018 - Present        Closed nondisplaced fracture of shaft of fifth metacarpal bone of left hand ICD-10-CM: S62.357A  ICD-9-CM: 815.03  4/28/2018 - Present        Subdural hematoma ([facility] ICD-10-CM: I62.00  ICD-9-CM: 432.1  4/27/2018 - Present        Long term (current) use of anticoagulants (Chronic) ICD-10-CM: Z79.01  ICD-9-CM: V58.61  4/19/2018 - Present        Dysthymia ICD-10-CM: F34.1  ICD-9-CM: 300.4  4/5/2018 - Present        Type 2 diabetes mellitus with nephropathy (HCC) (Chronic) ICD-10-CM: E11.21  ICD-9-CM: 250.40, 583.81  12/18/2017 - Present        Pulmonary hypertension (HCC) (Chronic) ICD-10-CM: I27.20  ICD-9-CM: 416.8  6/15/2016 - Present        S/P AVR (aortic valve replacement) (Chronic) ICD-10-CM: Z95.2  ICD-9-CM: V43.3  Unknown - Present    Overview Signed 2/23/2016 11:04 AM by Rupesh Tubbs     Mechanical/Artific             Cardiomyopathy ([facility] (Chronic) ICD-10-CM: I42.9  ICD-9-CM: 425.4  Unknown - Present        Osteopenia ICD-10-CM: M85.80  ICD-9-CM: 733.90  Unknown - Present        HLD (hyperlipidemia) (Chronic) ICD-10-CM: E78.5  ICD-9-CM: 272.4  Unknown - Present        Osteoarthritis ICD-10-CM: M19.90  ICD-9-CM: 715.90  Unknown - Present        ICD (implantable cardioverter-defibrillator) in place ICD-10-CM: Z95.810  ICD-9-CM: V45.02  10/2/2014 - Present    Overview Signed 10/2/2014  4:58 PM by Janae Aguilar single-chamber ICD implantation 10/20/14             Diabetes mellitus type 2, diet-controlled ([facility] (Chronic) ICD-10-CM: E11.9  ICD-9-CM: 250.00  8/28/2014 - Present        COPD (chronic obstructive pulmonary disease) ([facility] (Chronic) ICD-10-CM: J44.9  ICD-9-CM: 496  4/2/2014 - Present        REJI (obstructive sleep apnea)-cpap (Chronic) ICD-10-CM: G47.33  ICD-9-CM: 327.23  4/2/2014 - Present        CKD (chronic kidney disease) stage 3, GFR 30-59 ml/min (Chronic) ICD-10-CM: N18.3  ICD-9-CM: 585.3  7/10/2013 - Present        Anticoagulated on Coumadin (Chronic) ICD-10-CM: Z51.81, Z79.01  ICD-9-CM: V58.83, V58.61  7/9/2013 - Present    Overview Signed 7/9/2013  4:16 PM by Eliu Renae NP     S/P AVR             CAD (coronary artery disease) (Chronic) ICD-10-CM: I25.10  ICD-9-CM: 414.00  1/20/2013 - Present    Overview Signed 5/20/2014 10:05 AM by Gardiner Cockayne, NP     5/8/14 PCI LAD with stent placed             Chronic combined systolic and diastolic heart failure (HCC) (Chronic) ICD-10-CM: I50.42  ICD-9-CM: 428.42  1/20/2013 - Present    Overview Addendum 4/28/2018  4:53 AM by Coby Cockayne, MD     5/8/14 ECHO:  EF 10-15%  12/2017:  EF 25-30%             Essential hypertension, benign (Chronic) ICD-10-CM: I10  ICD-9-CM: 401.1  1/20/2013 - Present        MDS (myelodysplastic syndrome) (HCC) (Chronic) ICD-10-CM: D46.9  ICD-9-CM: 238.75  12/17/2011 - Present    Overview Addendum 8/29/2013 11:06 AM by Leatha Ashraf started in August, 2011 12/18/11 Procrit weekly and Iron stores. 5-12 12-13-12 good response to 3 weekly procrit did not need it last time    3-7-13 Pt doing well. Just wanted a \"check-up. \" Responding to Procrit every three weeks.   8-29-13 patient has missed some injections on recently restarted hemoglobin only issue , takes oral iron iron stores each time                Iron deficiency anemia due to chronic blood loss (Chronic) ICD-10-CM: D50.0  ICD-9-CM: 280.0  7/29/2009 - Present        RESOLVED: CHF (congestive heart failure) ([facility] ICD-10-CM: I50.9  ICD-9-CM: 428.0  2/17/2017 - 4/27/2018        RESOLVED: Routine general medical examination at a health care facility ICD-10-CM: Z00.00  ICD-9-CM: V70.0  12/16/2016 - 4/27/2018        RESOLVED: Chest pain, unspecified ICD-10-CM: R07.9  ICD-9-CM: 786.50  12/7/2016 - 12/16/2016        RESOLVED: Chronic left-sided low back pain without sciatica ICD-10-CM: M54.5, G89.29  ICD-9-CM: 724.2, 338.29  6/15/2016 - 4/27/2018        RESOLVED: Tachycardia ICD-10-CM: R00.0  ICD-9-CM: 785.0  Unknown - 12/16/2016    Overview Signed 2/23/2016 11:02 AM by Concetta Marc H/B              RESOLVED: Chronic respiratory failure (HCC) (Chronic) ICD-10-CM: J96.10  ICD-9-CM: 518.83  4/2/2014 - 4/27/2018              Plan:        Plan:   S/p fall with bilateral hand fxs, right knee sprain, FELIZ on CKD, anemia with underlying dx of MDS; physical debility       Continue daily physician medical management:  Pneumonia prophylaxis- Incentive spirometer every hour while awake      Digit fxs both hands; cont splint/rachel tape; only restriction is no lifting; has chronic RUE weakness from what I suspect is a RTC injury; xray neg; OT working on modalities      COPD/REJI; pt is on Trilogy at [place]. She manages this at home but having difficulty do to fxs in her hands  -cont Proventil , Pulmicort; Spiriva;  try weaning daytime O2; currently 4-6L due to hypoxia. Doesn't use during day at home per pt; 5/3 NOW reports chronic 3L at home, thus at baseline  -5/3 down from 4 to 2L , sats 98%; 5/4 on 3L  sats 96% (obviously needs due to drop noted to 65% sats when O2 accidentally not hooked up last noc and pt symptomatic with chest pain which resolved immediately when placed back on O2  -5/5 comfortable. No PRUETT, on home 3L NC, lowest sat 91%      Severe CHF/cardiomyopathy; compensated currently; cont Coreg, Lasix; has an ICD ; on entresto as well, 5/3  -5/3 no evidence of acute issues; 5/4 compensated well and considering dec Lasix  -5/5 recheck renal fx, may dec lasix  -5/8 dec Lasix due to FELIZ on CKD      DVT risk / DVT Prophylaxis- Will require daily physician exam to assess for signs and symptoms as patient is at increased risk for of thromboembolism.  Mobilization as tolerated. Intermittent pneumatic compression devices when in bed Thigh-high or knee-high thromboembolic deterrent hose when out of bed. On therapeutic Coumadin       Pain Control: ongoing significant pain in back, shoulders, knees and hands which is stable and controlled by PRN meds. Will require regular pain assessment and comprenhensive pain management,   -has end stage OA of the knees and now with right knee sprain; ice/ace wrap, modalities      Wound Care: Monitor wound status daily per staff and physician. At risk for failure. Will require 24/7 rehab nursing. None needed but at risk due to immobility   -5/3 hematoma dorsum right hand; warm compress      Hypertension - BP uncontrolled, fluctuating, managed medically. Has hypotension, asymptomatic, parameters with meds   -5/3 119-141 SBP, controlled; 5/4 /66 -; on Coreg, Lasix and Entresto; CHF well compensated; consider decreasing lasix which is dosed at 80mg bid  5/5 bp 141/60; 5/6 117/78       Scleral hemorrhage; clinically unchanged but less periocular swelling      MDS/chronic anemia; monitor , replace as needed, consider epogen  -5/3 hgb stable at 8.4; 5/4 consider dose of epogen but I believe there are restrictions to ordering and may need hematology, will increase iron due to deficiency; recheck 5/6; 8.1  -consult hematology Mond if labs concerning; looking back at past notes; was on weekly procrit if hgb < 10; will order  -transf 1 unit 5/7 ; hbg 9.2 from 8.3; cont epogen weekly.  F/u with hematology      FELIZ on CKD; improving, monitor labs and adjust meds; daily wt due to CHF  -creat slowly improving 5/3; 1.53 from 1.71 yesterday, BUN remains unchanged, 50's; monitor labs 3x/wk  -5/7 creat up; push fluids  -5/8 start 1/4 NS at 100 x 2 bags, creat 1.99 with bun 70s; dec Laix and hold entresto      Chronic coumadin therapy due to mech AVR; goal INR 2-3; inr 2.9; monitor and have pharmacy assist in dosing;   5/4 INR therap at 2.7 on home alternating doses of coumadin (2.5mg M/F and 5mg S/Tue/W/TH/SAT)      ? Hx of DM listed but on no medications ; do have on diet restrictions; carbohydrate restrictions      Urinary retention/ neurogenic bladder - schedule voids q6-8 hrs. Check post-void residual as needed; In and Out catheterize if post-void residual is more than 400 cc.  -5/4 no issues      bowel program - constipation, added bowel program; 5/4 continent; no const/diarr      GERD - resume PPI. At times may need additional antacids, Maalox prn.                 Time spent was 25 minutes with over 1/2 in direct patient care/examination, consultation and coordination of care.      Signed By: Ej Irby MD     May 8, 2018

## 2018-05-08 NOTE — PROGRESS NOTES
Purposeful hourly rounds completed this shift, needs met. End Of Shift Functional Summary, Nursing      TOILETING:    Does patient need assist with adjusting pants up or down and/or pericare? yes  If yes, please indicate what the patient needs help with:  Pants up, down, hygiene   Does the patient require extra time? yes  Does the patient require standby assistance? yes  Does the patient require contact guard assistance? no  Does the patient require more than one staff member for assistance? no    TOILET TRANSFER:    Pt requires minimal assistance. Pt uses grab bars. Does the patient require extra time? yes  Does the patient require contact guard assistance? no  Does the patient require more than one staff member for assistance? no    BLADDER:    Pt does not have a medina catheter that staff manages. Pt does not take medication. Pt is continent. of bladder and voids in toilet  Pt has had 0 bladder accidents during this shift   BOWEL:  Pt does take medication. Pt is continent of bowel and uses toilet. Pt has had 0 bowel accidents during this shift   BED/CHAIR TRANSFER  Pt requires minimal assistance. Does the patient require extra time? yes  Does the patient require contact guard assistance? yes  Does the patient require more than one staff member for assistance? no        EATING  Pt requires adaptive plate guard and set up. .    Documentation reviewed and plan of care discussed/reviewed with   patient, physician, therapists, oncoming nurse and patient assistant during the shift.

## 2018-05-08 NOTE — PROGRESS NOTES
Problem: Falls - Risk of  Goal: *Absence of Falls  Document Tia Fall Risk and appropriate interventions in the flowsheet. Outcome: Progressing Towards Goal  Fall Risk Interventions:  Mobility Interventions: Patient to call before getting OOB    Mentation Interventions: Door open when patient unattended    Medication Interventions: Patient to call before getting OOB, Teach patient to arise slowly    Elimination Interventions: Call light in reach, Patient to call for help with toileting needs    History of Falls Interventions: Door open when patient unattended        Problem: Pressure Injury - Risk of  Goal: *Prevention of pressure injury  Document Bahman Scale and appropriate interventions in the flowsheet. Outcome: Progressing Towards Goal  Pressure Injury Interventions:       Moisture Interventions: Absorbent underpads    Activity Interventions: Chair cushion, Increase time out of bed, Pressure redistribution bed/mattress(bed type)    Mobility Interventions: Chair cushion, Pressure redistribution bed/mattress (bed type)    Nutrition Interventions: Offer support with meals,snacks and hydration    Friction and Shear Interventions: HOB 30 degrees or less               Comments: Patient calls appropriately for assistance.

## 2018-05-08 NOTE — PROGRESS NOTES
OT Daily Note    Time In 1115   Time Out 1210     Subjective: Carol Maggie say no pain no gain, but this right shoulder is hurting\" Agreeable to therapy. Pain:During PROM in RUE, still very limited but better than yesterday. Interdisciplinary Communication: Collaborated with Destiney Baig and patient is making progress towards goals. Precautions: Other (comment) (fall precautions, NWB Left hand)    Balance/functional mobility Daily Assessment   Ambulated ~15' with CGA from recliner <> w/c      Joint mobilization Daily Assessment   Continued mobilization to reduce pain at level 1 in [facility] joint and scapula, improved mobilization and tolerance compared to yesterday. Also improved R shoulder abduction this session getting up to ~20 Degrees. [facility] Daily Assessment   Completed large set of wooden pegs (about the size of a built up handle). Writer facilitated reach to various distances. 20 pegs in total with good accuracy. Feeding Daily Assessment   S/u for lunch with plate guard, non skid surface, built up spoon, and items set up within reach. Patient able to feed with LUE improving each day. Ended session:in recliner, finished with lunch, all needs within reach.      Ankur Cui OTR/L

## 2018-05-08 NOTE — PROGRESS NOTES
Warfarin dosing per pharmacist    Norah James Nata Payne is a 77 y.o. female. Height: 5' 2\" (157.5 cm)    Weight: 101.2 kg (223 lb)    Indication:  Mechanical aortic valve replacement    Goal INR:  2-3    Home dose:  2.5 mg Mon, Fri; 5 mg all other days    Risk factors or significant drug interactions:  none    Other anticoagulants:  none    Daily Monitoring  Date  INR     Warfarin dose HGB              Notes  4/28  2.1          5 mg + 2.5 mg 7.4  4/29  2.4  5 mg  7  4/30  2.6  2.5 mg  8  5/1  2.9  5 mg  7.9  5/2  2.9  5 mg  8.5  Pharmacy consulted  5/3  2.7  5 mg  8.4  5/4  2.7  2.5 mg  ---   5/5  3  5 mg  ---   5/6  2.8  5 mg  8.1  5/7  2.8  2.5 mg  8.3  5/8  2.7  5 mg  9.2    Pharmacy consulted to dose warfarin for Ms. Corral. She has been transferred to inpatient rehab following hospital stay for complications following a fall. She is followed by [facility] at the anti-coag clinic and was therapeutic on her home dose at her last visit on 4/19/2018. INR 2.7. Continue home dose of warfarin. Will receive 5 mg today. Consider MWF INR if stable tomorrow. Pharmacy will continue to follow. Please call with any questions.     Thank you,  Chelsey Stephenson, PharmD  Clinical Pharmacist  [phone]

## 2018-05-08 NOTE — PROGRESS NOTES
End Of Shift Functional Summary, Nursing      TOILETING:    Does patient need assist with adjusting pants up or down and/or pericare? yes  If yes, please indicate what the patient needs help with:  Pants up and down and madi care (i.e. pants up, pants down, pericare)  Pt uses raised toilet seat. Does the patient require extra time? yes  Does the patient require standby assistance? yes  Does the patient require contact guard assistance? yes  Does the patient require more than one staff member for assistance? no    TOILET TRANSFER:    Pt requires minimal assistance. Pt uses raised toilet seat. Does the patient require extra time? yes  Does the patient require contact guard assistance? yes  Does the patient require more than one staff member for assistance? yes    BLADDER:    Pt does not have a medina catheter that staff manages. Pt does not take medication. If so, please indicate which medication:    Pt is continent. of bladder and voids in toilet     BOWEL:  Pt does take medication. Pt is continent of bowel and uses toilet. Documentation reviewed and plan of care discussed/reviewed with   patient during the shift.

## 2018-05-08 NOTE — PROGRESS NOTES
Problem: Falls - Risk of  Goal: *Absence of Falls  Document Tia Fall Risk and appropriate interventions in the flowsheet.    Outcome: Progressing Towards Goal  Fall Risk Interventions:  Mobility Interventions: Utilize walker, cane, or other assitive device    Mentation Interventions: Door open when patient unattended    Medication Interventions: Patient to call before getting OOB    Elimination Interventions: Call light in reach    History of Falls Interventions: Door open when patient unattended

## 2018-05-08 NOTE — PROGRESS NOTES
PHYSICAL THERAPY DAILY NOTE  Time In: 0627  Time Out: 0818  Patient Seen For: PM;Gait training;Patient education; Therapeutic exercise;Transfer training; Other (see progress notes)    Subjective: Patient reporting she is tired this PM. Reports she gets short of breath when walking long distances         Objective:Vital Signs: 02 at 3 lpm. 02 sat 86% after ambulating 110 ft. 02 sat 90% after 2 minutes of rest  Patient Vitals for the past 12 hrs:   Temp Pulse Resp BP SpO2   05/08/18 1516 98.4 °F (36.9 °C) 68 16 106/58 97 %   05/08/18 1222 - - - - 93 %   05/08/18 0800 98.8 °F (37.1 °C) 77 18 138/64 92 %   05/08/18 0559 - - - - 96 %       Pain level: 3 to 6 out of 10  Pain location:lateral aspect of both hands and right knee  Pain interventions:Pain medication per RN, rest, positioning,body mechanics    Patient education:energy conservation,transfer training, gait training, fall precautions, activity pacing, body mechanics,breathing techniques during mobility, Patient verbalizing understanding and demonstrating  understanding of patient education. Recommend follow up education.       Interdisciplinary Communication:NA    Other (comment) (fall precautions, NWB Left hand)  GROSS ASSESSMENT Daily Assessment     NA       BED/MAT MOBILITY Daily Assessment    Rolling Right : 0 (Not tested)  Rolling Left : 0 (Not tested)  Supine to Sit : 0 (Not tested)  Sit to Supine : 0 (Not tested)       TRANSFERS Daily Assessment   Increased time and effort to complete with cues for body mechanics  Excessive trunk flexion during sit to stand Transfer Type: SPT without device  Transfer Assistance : 5 (Supervision/setup)  Sit to Stand Assistance: Stand-by assistance  Car Transfers: Not tested       GAIT Daily Assessment   5 minute rest breaks between attempts Amount of Assistance: 5 (Stand-by assistance)  Distance (ft): 110 Feet (ft) (110ft x 2)  Assistive Device:  (No device)   Gait training with cues for managing 02 line and for controlling breathing pattern. STEPS or STAIRS Daily Assessment    NA       BALANCE Daily Assessment    Sitting - Static: Good (unsupported)  Sitting - Dynamic: Good (unsupported)  Standing - Static: Good  Standing - Dynamic : Impaired       WHEELCHAIR MOBILITY Daily Assessment    Curbs/Ramps Assist Required (FIM Score): 0 (Not tested)  Wheelchair Setup Assist Required : 0 (Not tested)       LOWER EXTREMITY EXERCISES Daily Assessment    Extremity: Both  Exercise Type #1: Seated lower extremity strengthening  Sets Performed: 2  Reps Performed: 10  Level of Assist: Minimal assistance  Exercise Type #2: Other (comment) (LE motomed x 10 mins at level 2)  Level of Assist: Supervision     SEATED EXERCISES Sets Reps Comments   Ankle Pumps 1 30    Hip Flexion 2 10    Long Arc Quads 2 10    Hip Adduction/Ball Squeeze 1 20    Hip Abduction with red Theraband 1 20    Hamstring Curls with red Theraband 2 10    Hip Extension with red Theraband 2 10               Assessment: Slowly improving with ability to transfer sit to stand but difficult to correct body mechanics due to LE weakness and unable to utilize UEs due to hand fractures       Patient returned to room at end of treatment and remained up in recliner with LEs elevated and with needs in reach. 02 at 3 lpm    Plan of Care: Continue with POC and progress as tolerated.      Dandre Green, PT  5/8/2018

## 2018-05-09 LAB
ABO + RH BLD: NORMAL
ANION GAP SERPL CALC-SCNC: 8 MMOL/L (ref 7–16)
BLD PROD TYP BPU: NORMAL
BLOOD GROUP ANTIBODIES SERPL: NORMAL
BPU ID: NORMAL
BUN SERPL-MCNC: 65 MG/DL (ref 8–23)
CALCIUM SERPL-MCNC: 9.1 MG/DL (ref 8.3–10.4)
CHLORIDE SERPL-SCNC: 100 MMOL/L (ref 98–107)
CO2 SERPL-SCNC: 32 MMOL/L (ref 21–32)
CREAT SERPL-MCNC: 1.65 MG/DL (ref 0.6–1)
CROSSMATCH RESULT,%XM: NORMAL
GLUCOSE SERPL-MCNC: 132 MG/DL (ref 65–100)
INR PPP: 2.5
MAGNESIUM SERPL-MCNC: 2.5 MG/DL (ref 1.8–2.4)
PHOSPHATE SERPL-MCNC: 4.3 MG/DL (ref 2.3–3.7)
POTASSIUM SERPL-SCNC: 4.5 MMOL/L (ref 3.5–5.1)
PROTHROMBIN TIME: 26.7 SEC (ref 11.5–14.5)
SODIUM SERPL-SCNC: 140 MMOL/L (ref 136–145)
SPECIMEN EXP DATE BLD: NORMAL
STATUS OF UNIT,%ST: NORMAL
UNIT DIVISION, %UDIV: 0

## 2018-05-09 PROCEDURE — 97530 THERAPEUTIC ACTIVITIES: CPT

## 2018-05-09 PROCEDURE — 99232 SBSQ HOSP IP/OBS MODERATE 35: CPT | Performed by: PHYSICAL MEDICINE & REHABILITATION

## 2018-05-09 PROCEDURE — 74011000258 HC RX REV CODE- 258: Performed by: PHYSICAL MEDICINE & REHABILITATION

## 2018-05-09 PROCEDURE — 65310000000 HC RM PRIVATE REHAB

## 2018-05-09 PROCEDURE — 83735 ASSAY OF MAGNESIUM: CPT | Performed by: PHYSICAL MEDICINE & REHABILITATION

## 2018-05-09 PROCEDURE — 94760 N-INVAS EAR/PLS OXIMETRY 1: CPT

## 2018-05-09 PROCEDURE — 94640 AIRWAY INHALATION TREATMENT: CPT

## 2018-05-09 PROCEDURE — 97116 GAIT TRAINING THERAPY: CPT

## 2018-05-09 PROCEDURE — 74011250637 HC RX REV CODE- 250/637: Performed by: PHYSICAL MEDICINE & REHABILITATION

## 2018-05-09 PROCEDURE — 85610 PROTHROMBIN TIME: CPT | Performed by: PHYSICAL MEDICINE & REHABILITATION

## 2018-05-09 PROCEDURE — 84100 ASSAY OF PHOSPHORUS: CPT | Performed by: PHYSICAL MEDICINE & REHABILITATION

## 2018-05-09 PROCEDURE — 97110 THERAPEUTIC EXERCISES: CPT

## 2018-05-09 PROCEDURE — 77010033678 HC OXYGEN DAILY

## 2018-05-09 PROCEDURE — 36415 COLL VENOUS BLD VENIPUNCTURE: CPT | Performed by: PHYSICAL MEDICINE & REHABILITATION

## 2018-05-09 PROCEDURE — 80048 BASIC METABOLIC PNL TOTAL CA: CPT | Performed by: PHYSICAL MEDICINE & REHABILITATION

## 2018-05-09 PROCEDURE — 97535 SELF CARE MNGMENT TRAINING: CPT

## 2018-05-09 PROCEDURE — 74011000250 HC RX REV CODE- 250: Performed by: PHYSICAL MEDICINE & REHABILITATION

## 2018-05-09 RX ADMIN — BUDESONIDE 500 MCG: 0.5 INHALANT RESPIRATORY (INHALATION) at 05:54

## 2018-05-09 RX ADMIN — SODIUM CHLORIDE 100 ML/HR: 450 INJECTION, SOLUTION INTRAVENOUS at 21:34

## 2018-05-09 RX ADMIN — TIOTROPIUM BROMIDE 18 MCG: 18 CAPSULE ORAL; RESPIRATORY (INHALATION) at 06:00

## 2018-05-09 RX ADMIN — LORAZEPAM 1 MG: 1 TABLET ORAL at 21:33

## 2018-05-09 RX ADMIN — ALBUTEROL SULFATE 2.5 MG: 2.5 SOLUTION RESPIRATORY (INHALATION) at 05:54

## 2018-05-09 RX ADMIN — POLYETHYLENE GLYCOL (3350) 17 G: 17 POWDER, FOR SOLUTION ORAL at 08:19

## 2018-05-09 RX ADMIN — FERROUS SULFATE TAB 325 MG (65 MG ELEMENTAL FE) 325 MG: 325 (65 FE) TAB at 17:48

## 2018-05-09 RX ADMIN — SIMVASTATIN 20 MG: 20 TABLET, FILM COATED ORAL at 21:33

## 2018-05-09 RX ADMIN — CARVEDILOL 3.12 MG: 3.12 TABLET, FILM COATED ORAL at 08:20

## 2018-05-09 RX ADMIN — CARVEDILOL 3.12 MG: 3.12 TABLET, FILM COATED ORAL at 17:47

## 2018-05-09 RX ADMIN — BUDESONIDE 500 MCG: 0.5 INHALANT RESPIRATORY (INHALATION) at 18:09

## 2018-05-09 RX ADMIN — WARFARIN SODIUM 5 MG: 2.5 TABLET ORAL at 17:48

## 2018-05-09 RX ADMIN — OXYCODONE HYDROCHLORIDE 10 MG: 5 TABLET ORAL at 05:40

## 2018-05-09 RX ADMIN — OXYCODONE HYDROCHLORIDE 10 MG: 5 TABLET ORAL at 17:47

## 2018-05-09 RX ADMIN — SACUBITRIL AND VALSARTAN 1 TABLET: 24; 26 TABLET, FILM COATED ORAL at 08:20

## 2018-05-09 RX ADMIN — ESCITALOPRAM 10 MG: 10 TABLET, FILM COATED ORAL at 08:21

## 2018-05-09 RX ADMIN — ALBUTEROL SULFATE 2.5 MG: 2.5 SOLUTION RESPIRATORY (INHALATION) at 11:00

## 2018-05-09 RX ADMIN — FUROSEMIDE 60 MG: 40 TABLET ORAL at 08:21

## 2018-05-09 RX ADMIN — FERROUS SULFATE TAB 325 MG (65 MG ELEMENTAL FE) 325 MG: 325 (65 FE) TAB at 08:20

## 2018-05-09 RX ADMIN — SODIUM CHLORIDE 100 ML/HR: 450 INJECTION, SOLUTION INTRAVENOUS at 15:15

## 2018-05-09 RX ADMIN — SENNOSIDES 8.6 MG: 8.6 TABLET, FILM COATED ORAL at 08:20

## 2018-05-09 RX ADMIN — ALBUTEROL SULFATE 2.5 MG: 2.5 SOLUTION RESPIRATORY (INHALATION) at 18:09

## 2018-05-09 RX ADMIN — FUROSEMIDE 60 MG: 40 TABLET ORAL at 17:48

## 2018-05-09 NOTE — PROGRESS NOTES
OT WEEKLY PROGRESS NOTE    Time In: 0700  Time Out: 0751    COMPREHENSION MODE Initial Assessment Weekly Progress Assessment 5/9/2018   Score 4 (Understands basic needs 75-89%, may need words repeated.) 5     EXPRESSION Initial Assessment Weekly Progress Assessment 5/9/2018   Primary Mode of Expression Verbal Verbal   Score 6 7   Comments Mild difficulty with complex       SOCIAL INTERACTION/ PRAGMATICS Initial Assessment Weekly Progress Assessment 5/9/2018   Score 5 6   Comments Needs supervision only under stressful or unfamiliar conditions, interacts appropriately 90% of the time, needs monitoring or encouragement for participation or interaction. self corrects     PROBLEM SOLVING Initial Assessment Weekly Progress Assessment 5/9/2018   Score 3 4   Comments Solves basic problems 50-74% of the time. Solves basic problems 75-89% of the time     MEMORY Initial Assessment Weekly Progress Assessment 5/9/2018   Score 4 5   Comments Recalled 2/3 words Recognizes, recalls, or executes 3 steps of 3 step request 90% of the time      EATING Initial Assessment Weekly Progress Assessment 5/9/2018   Functional Level 1 5   Comments s/u assist with DME s/u assist with DME     GROOMING Initial Assessment Weekly Progress Assessment 5/9/2018   Functional Level 2 2   Tasks completed by patient Washed hands, Washed face, Brushed hair Washed face   Comments patient did 1 of 3 tasks able to was face     BATHING Initial Assessment Weekly Progress Assessment 5/9/2018   Functional Level 1 3   Body parts patient bathed Chest, Abdomen Aura area; Thigh, left; Thigh, right;Chest;Abdomen   Comments patient required assist with 8 out of 10 parts washed 5 out of 10 spots     TUB/SHOWER TRANSFER INDEPENDENCE Initial Assessment Weekly Progress Assessment 5/9/2018   Score 4 4   Comments not completed during evaluation as she sponge bathes at home [facility]     UPPER BODY DRESSING/UNDRESSING Initial Assessment Weekly Progress Assessment 5/9/2018 Functional Level 1 2   Items applied/Steps completed Pullover (4 steps) Pullover (4 steps)   Comments Dependent, try to go through head hole instead of bottom assist with 3 out of 4 steps     LOWER BODY DRESSING/UNDRESSING Initial Assessment Weekly Progress Assessment 5/9/2018   Functional Level 1 1   Items applied/Steps completed Underpants (3 steps), Elastic waist pants (3 steps) Sock, left (1 step); Sock, right (1 step); Underpants (3 steps); Elastic waist pants (3 steps)   Comments A for all parts assist all parts     TOILETING Initial Assessment Weekly Progress Assessment 5/9/2018   Functional Level 1 1   Comments assist all parts       TOILET TRANSFER INDEPENDENCE Initial Assessment Weekly Progress Assessment 5/9/2018   Transfer score 2 3   Comments maximal assist moderate assist     Plan of Care: Please see Care Plan for updated LTGs. Family Training:  Scheduled for next Tuesday with daughter(s)    Summary of Progress: Great progress with transfers during ADL's, ability to use DME for self feeding, and incorporating assistance with dressing. RUE remains largest barrier at shoulder, this problem is over 1years old. Summary of Session: S: \"I am ready to get home when I can. \" Agreeable to therapy. Focus of session was on morning ADL routine and progress note. Patient was able to ambulate ~20 feet using HHA with minimal assist.   Pain not indicated during session. Collaborated with PT, Valentin Owusu and confirmed patient is on track to reach goals as documented in the care plan. Patient tolerated session well, but strength, balance, activity tolerance,  are still below baseline and requires skilled facilitation to successfully and safely complete ADL's and transfers. Patient ended session in recliner, set up for breakfast, with call remote and phone within reach.      Elizabeth Mims OTR

## 2018-05-09 NOTE — PROGRESS NOTES
TOILETING:    Does patient need assist with adjusting pants up or down and/or pericare? yes  If yes, please indicate what the patient needs help with:  Pants up and down, pericare   Does the patient require extra time? yes  Does the patient require standby assistance? yes  Does the patient require contact guard assistance? yes  Does the patient require more than one staff member for assistance? no      TOILET TRANSFER:    Pt requires minimal assistance.  Pt uses raised toilet seat. Does the patient require extra time? yes  Does the patient require contact guard assistance? yes  Does the patient require more than one staff member for assistance? yes      BLADDER:    Pt does not have a medina catheter that staff manages.  Pt does not take medication.  If so, please indicate which medication.   Pt is continent. of bladder and voids in toilet. Pt has had 0 bladder accidents during this shift.       BOWEL:  Pt does take medication.  Pt is continent of bowel and uses toilet. Pt has had 0 bowel accidents during this shift.      BED/CHAIR TRANSFER  Pt requires minimal assistance. Does the patient require extra time? yes  Does the patient require contact guard assistance? yes  Does the patient require more than one staff member for assistance? no          EATING  Pt requires setup. TUBE FEEDINGS:  Pt does not  receive nutrition through tube feedings.      Documentation reviewed and plan of care discussed/reviewed with   patient, physician, therapists, oncoming nurse and patient assistant during the shift.

## 2018-05-09 NOTE — PROGRESS NOTES
Warfarin dosing per pharmacist    Lewis Alyshadanuta Kiersten Chao is a 77 y.o. female. Height: 5' 2\" (157.5 cm)    Weight: 101.2 kg (223 lb)    Indication:  Mechanical aortic valve replacement    Goal INR:  2-3    Home dose:  2.5 mg Mon, Fri; 5 mg all other days    Risk factors or significant drug interactions:  none    Other anticoagulants:  none    Daily Monitoring  Date  INR     Warfarin dose HGB              Notes  4/28  2.1          5 mg + 2.5 mg 7.4  4/29  2.4  5 mg  7  4/30  2.6  2.5 mg  8  5/1  2.9  5 mg  7.9  5/2  2.9  5 mg  8.5  Pharmacy consulted  5/3  2.7  5 mg  8.4  5/4  2.7  2.5 mg  ---   5/5  3  5 mg  ---   5/6  2.8  5 mg  8.1  5/7  2.8  2.5 mg  8.3  5/8  2.7  5 mg  9.2  5/9  2.5  5 mg  --     Pharmacy consulted to dose warfarin for Ms. Corral. She has been transferred to inpatient rehab following hospital stay for complications following a fall. She is followed by [facility] at the anti-coag clinic and was therapeutic on her home dose at her last visit on 4/19/2018. INR 2.5. Continue home dose of warfarin. Change to [facility] INRs. Pharmacy will continue to follow. Please call with any questions. Thank you,  Giovanni Boggs, Pharm. D.   Clinical Pharmacist  [phone]

## 2018-05-09 NOTE — PROGRESS NOTES
Problem: Falls - Risk of  Goal: *Absence of Falls  Document Tia Fall Risk and appropriate interventions in the flowsheet. Outcome: Progressing Towards Goal  Fall Risk Interventions:  Mobility Interventions: Utilize walker, cane, or other assitive device    Mentation Interventions: Door open when patient unattended    Medication Interventions: Patient to call before getting OOB    Elimination Interventions: Call light in reach    History of Falls Interventions: Door open when patient unattended        Problem: Pressure Injury - Risk of  Goal: *Prevention of pressure injury  Document Bahman Scale and appropriate interventions in the flowsheet.    Pressure Injury Interventions:       Moisture Interventions: Absorbent underpads    Activity Interventions: Increase time out of bed    Mobility Interventions: HOB 30 degrees or less    Nutrition Interventions: Document food/fluid/supplement intake    Friction and Shear Interventions: HOB 30 degrees or less

## 2018-05-09 NOTE — PROGRESS NOTES
12:00-4:20a . Lisa Parson The documentation for this period is being entered following the guidelines as defined in the [address] downtime policy by Coleman Heard RN.

## 2018-05-09 NOTE — PROGRESS NOTES
PT WEEKLY PROGRESS NOTE   Time In: 3842   Time Out: 1001    Subjective: \"I'm doing just fine, I need some help getting this cast on though. \" Patient agreeable to therapy. Objective: Other (comment) (fall precautions, NWB Left hand)    Outcome Measures: Vital Signs:   Patient Vitals for the past 8 hrs:   Temp Pulse Resp BP SpO2   05/09/18 0729 98.3 °F (36.8 °C) 69 12 156/83 93 %   05/09/18 0600 - - - - 96 %            Pain level: No pain reported this AM.  Pain location: n/a  Pain interventions: n/a    Patient education: Educated patient on safety with stair ascend/descend. Interdisciplinary Communication: Communicated with Vidya Rodriguez regarding patient's cast on L forearm. AROM: Generally decreased, functional    FIM SCORES Initial Assessment Weekly Progress Assessment 5/9/2018   Bed/Chair/Wheelchair Transfers 3 5   Wheelchair Mobility 0 NT   Walking Price 2 4   Steps/Stairs 0 2   Please see IRC Interdisciplinary Eval: Coordination/Balance Section for details regarding FIM score description.     BED/CHAIR/WHEELCHAIR TRANSFERS Initial Assessment Weekly Progress Assessment 5/9/2018   Rolling Right 4 (Minimal assistance)     Rolling Left 4 (Minimal assistance)     Supine to Sit 4 (Minimal assistance)     Sit to Stand Moderate assistance Stand-by assistance   Sit to Supine 4 (Minimal assistance)     Transfer Type SPT without device SPT without device   Comments Lifting A     Car Transfer Not tested Not tested   Car Type           WHEELCHAIR MOBILITY/MANAGEMENT Initial Assessment Weekly Progress Assessment 5/9/2018   Able to Propel 0 feet (Unable to effectively propell with UEs.)     Curbs/ramps assistance required 0 (Not tested) 0 (Not tested)   Wheelchair set up assistance required 0 (Not tested)     Wheelchair management         WALKING INDEPENDENCE Initial Assessment Weekly Progress Assessment 5/9/2018   Assistive device Gait belt  (no device)   Ambulation assistance - level surface 4 (Contact guard assistance)  4 CGA   Distance 75 Feet (ft) 150 Feet (ft)   Comments slow cont step through gait with mild decrease in ankle DF in initial contact, mild one time ataxia with deviation to left with patient able to correct Slow step through gait pattern with mild forward head flexion with increased trunk lean to stance side during single limb stance. GAIT Weekly Progress Assessment 5/9/2018   Gait Description (WDL)     Gait Abnormalities       STEPS/STAIRS Initial Assessment Weekly Progress Assessment 5/9/2018   Steps/Stairs ambulated 0 4   Rail Use  (None)  (PT assist under B forearm, NWB B wrist)   Comments Unable to ambulate up/down steps at this time due to inability to use UEs on hand rails and limited flexion strength and decreased balance     Curbs/Ramps 0 (Not tested)         Patient returned to room sitting in recliner with all needs in reach. Assessment: Patient making good progress towards goals. Patient will benefit from further stair training to allow for increased safety and independence with entrance to household upon discharge. Patient will continue to benefit from therapy to increase functional strength for increased independence with functional mobility. Plan of Care: Please see Care Plan for updated LTGs. Family Training: To be scheduled.     Traci Proctor  5/9/2018

## 2018-05-09 NOTE — PROGRESS NOTES
OT Daily Note    Time In 1112   Time Out 1206     Subjective:\"Is it lunch time yet\" Agreeable to therapy. Pain:tolerated better in R shoulder, but still not able to perform PROM this date due to complaints from session yesterday. Education:On s/u at home for eating. Interdisciplinary Communication: Collaborated with Gladys Powers and patient is making progress towards goals. Precautions: Other (comment) (fall precautions, NWB Left hand)    Balance/functional mobility Daily Assessment   Ambulated 20' x 2 with SBA no device. R UE Daily Assessment   Added biofreeze start of session for pain relief on Gh joint and scapula. Also only complete light tissue work on scapula and [address] for pain relief with good results this session. AROM wrist extension and flexion within limits of pain. [facility] Daily Assessment   Completed smaller wooden pegs closer to the size of large markers to complete with HELADIO for [facility], able to use thumb and first digit to place 12 pegs, also incorporated right hand to help manipulate pegs in left hand. Feeding Daily Assessment   Set up with same DME as earlier notes for feeding, improving quality. Ended session:In recliner, setup and eating lunch.      Ritu Proctor OTR/L

## 2018-05-09 NOTE — PROGRESS NOTES
PHYSICAL THERAPY DAILY NOTE  Time In: 1301  Time Out: 1344  Patient Seen For: PM;Gait training;Patient education; Therapeutic exercise;Transfer training    Subjective: \"I'm feeling alright this afternoon. \" Patient agreeable to therapy. Objective: Vital Signs:   Patient Vitals for the past 8 hrs:   SpO2   05/09/18 1100 96 %         Pain level: No pain reported. Pain location: n/a  Pain interventions: n/a    Patient education: Educated patient on safety with transfers and activity pacing. Interdisciplinary Communication: Communicated with OT regarding patient's POC. Other (comment) (fall precautions, NWB Left hand)  GROSS ASSESSMENT Daily Assessment            BED/MAT MOBILITY Daily Assessment            TRANSFERS Daily Assessment   Required for safety. Transfer Type: SPT without device  Transfer Assistance : 5 (Supervision/setup)  Sit to Stand Assistance: Minimal assistance  Car Transfers: Not tested       GAIT Daily Assessment   Patient ambulated with slow step through gait pattern. Amount of Assistance: 5 (Stand-by assistance)  Distance (ft): 25 Feet (ft) (x2)  Assistive Device:  (no device)         BALANCE Daily Assessment    Sitting - Static: Good (unsupported)  Sitting - Dynamic: Good (unsupported)  Standing - Static: Good  Standing - Dynamic : Impaired       LOWER EXTREMITY EXERCISES Daily Assessment    Extremity: Both  Exercise Type #1: Seated lower extremity strengthening  Sets Performed: 2  Reps Performed: 15  Level of Assist: Supervision     SEATED EXERCISES Sets Reps Comments   Ankle Pumps 2 15    Hip Flexion 2 15    Long Arc Quads 2 15    Hip Adduction/Ball Squeeze 2 15    Hip Abduction with Green Theraband 2 15    Hamstring Curls with Green Theraband 2 15          Patient returned to room sitting in recliner with all needs in reach. Assessment: Patient making good progress towards goals. Patient required lifting A from seated position possibly due to fatigue.          Plan of Care: Continue with POC and progress as tolerated.        Charli Seth  5/9/2018

## 2018-05-09 NOTE — PROGRESS NOTES
Spoke to [facility] -   [phone], the manager for the home care agency that provides pt's aide. Discussed pt's concerns about her aide not providing transportation. Ms Vee Escalona will work on providing pt with transportation from another aide but for main care the pt will retain her original aide. Discussed with pt and daughter; they are agreeable to this plan. Also contacted Mitch Go -  [phone] [facility]  to discuss pt's discharge and to request increasing hours for aide beyond 20 hrs per week. Ms James Lagunas will put in a request for additional hours to the state office. She will plan to have services resume next week following discharge. Discussed with patient.

## 2018-05-09 NOTE — PROGRESS NOTES
Problem: Falls - Risk of  Goal: *Absence of Falls  Document Tia Fall Risk and appropriate interventions in the flowsheet.    Outcome: Progressing Towards Goal  Fall Risk Interventions:  Mobility Interventions: Patient to call before getting OOB    Mentation Interventions: Door open when patient unattended    Medication Interventions: Evaluate medications/consider consulting pharmacy, Patient to call before getting OOB    Elimination Interventions: Call light in reach, Patient to call for help with toileting needs    History of Falls Interventions: Consult care management for discharge planning, Door open when patient unattended

## 2018-05-09 NOTE — PROGRESS NOTES
Subjective \"I want to get much better and have a regular regimen. \"   Activity Evaluation   Strength/Endurance Patient with decreased activity tolerance but commented that she was Marilee El" before she came here. Patient motivated to participate. Balance Sit<->stand:  SBA without AD   Social Interaction Patient was friendly and in good spirits during the session. She joked around appropriately with this therapist, sharing stories about her 3 daughters. Cognitive A&O X4   Comments Patient was on 3 liter O2 without c/o or signs of shortness of breath. She was left seated in her recliner chair with all needs within reach. Patient's Recreational Therapy evaluation completed under the [facility] navigation tool on 5/9/18. Please see for specifics regarding plan of care and goals. Patient prefers to be called \"Cholly. \" Thank you for the referral.  Ashlee Grover, CTRS

## 2018-05-09 NOTE — PROGRESS NOTES
Karyle Furl, MD,   Medical Director  [address] [place] [address], [address] [facility]  Tel: [phone]       [facility] PROGRESS NOTE    Leora Beltran  Admit Date: 5/2/2018  Admit Diagnosis: Debillity ; Physical debility    Subjective     Doing well. No complaints. Slept well.  Right knee and right hand still painful    Objective:     Current Facility-Administered Medications   Medication Dose Route Frequency    0.45% sodium chloride infusion  100 mL/hr IntraVENous CONTINUOUS    furosemide (LASIX) tablet 60 mg  60 mg Oral BID    0.9% sodium chloride infusion 250 mL  250 mL IntraVENous PRN    diphenhydrAMINE (BENADRYL) capsule 25 mg  25 mg Oral Q6H PRN    epoetin jairo (EPOGEN;PROCRIT) injection 4,000 Units  4,000 Units SubCUTAneous Q7D    ferrous sulfate tablet 325 mg  1 Tab Oral BID WITH MEALS    acetaminophen (TYLENOL) tablet 650 mg  650 mg Oral Q4H PRN    bisacodyl (DULCOLAX) tablet 5 mg  5 mg Oral DAILY PRN    LORazepam (ATIVAN) tablet 1 mg  1 mg Oral BID PRN    naloxone (NARCAN) injection 0.4 mg  0.4 mg IntraVENous PRN    prochlorperazine (COMPAZINE) injection 5 mg  5 mg IntraVENous Q8H PRN    sodium chloride (NS) flush 5-10 mL  5-10 mL IntraVENous PRN    albuterol (PROVENTIL VENTOLIN) nebulizer solution 2.5 mg  2.5 mg Nebulization Q4H PRN    bisacodyl (DULCOLAX) suppository 10 mg  10 mg Rectal DAILY PRN    budesonide (PULMICORT) 500 mcg/2 ml nebulizer suspension  500 mcg Nebulization BID RT    And    albuterol (PROVENTIL VENTOLIN) nebulizer solution 2.5 mg  2.5 mg Nebulization Q6HWA RT    carvedilol (COREG) tablet 3.125 mg  3.125 mg Oral BID WITH MEALS    escitalopram oxalate (LEXAPRO) tablet 10 mg  10 mg Oral DAILY    loratadine (CLARITIN) tablet 10 mg  10 mg Oral DAILY PRN    oxyCODONE IR (ROXICODONE) tablet 10 mg  10 mg Oral Q4H PRN    polyethylene glycol (MIRALAX) packet 17 g  17 g Oral DAILY    sacubitril-valsartan (ENTRESTO) 24-26 mg tablet 1 Tab 1 Tab Oral BID    senna (SENOKOT) tablet 8.6 mg  1 Tab Oral DAILY    simvastatin (ZOCOR) tablet 20 mg  20 mg Oral QHS    tiotropium (SPIRIVA) inhalation capsule 18 mcg  1 Cap Inhalation DAILY    traMADol (ULTRAM) tablet 50 mg  50 mg Oral Q6H PRN    warfarin (COUMADIN) tablet 2.5 mg  2.5 mg Oral Once per day on Mon Fri    warfarin (COUMADIN) tablet 5 mg  5 mg Oral Once per day on Sun Tue Wed Thu Sat     Review of Systems:Denies chest pain, shortness of breath, cough, headache, visual problems, abdominal pain, dysurea, calf pain. Pertinent positives are as noted in the medical records and unremarkable otherwise. Visit Vitals    /83    Pulse 69    Temp 98.3 °F (36.8 °C)    Resp 12    Ht 5' 2\" (1.575 m)    Wt 223 lb (101.2 kg)    SpO2 96%    Breastfeeding No    BMI 40.79 kg/m2    on 3L O2    Physical Exam:   General: Alert and age appropriately oriented. No acute cardio respiratory distress. HEENT: Normocephalic,no scleral icterus  Oral mucosa moist without cyanosis   Lungs: Clear to auscultation  bilaterally. Respiration even and unlabored   Heart: Regular rate and rhythm, S1, S2   No  murmurs, clicks, rub or gallops   Abdomen: Soft, non-tender, nondistended. Bowel sounds present. No organomegaly. obese   Genitourinary: defer   Neuromuscular:      Chronic Right shoulder weakness and pain with PROM  Right hand; 4th and 5th digits rachel taped. Cannot flex DIP and minimal flexion PIP of 2nd and 3rd digits; can do so passively  Prox LE 3   Skin/extremity: No rashes, no erythema.  No calf tenderness BLE  Abrasion dorsum right hand, mild swelling; left hand /wrist splinted, nvi                                                                            Functional Assessment:          Balance  Sitting - Static: Good (unsupported) (05/09/18 1000)  Sitting - Dynamic: Good (unsupported) (05/09/18 1000)  Standing - Static: Good (05/09/18 1000)  Standing - Dynamic : Impaired (05/09/18 1000) Feeding  Adaptive Equipment: Non-skid surface;Built up spoon;Plate guard (28/53/52 1014)             Jeannine Harley Fall Risk Assessment:  Jeannine Harley Fall Risk  Mobility: Ambulates or transfers with assist devices or assistance (05/09/18 0715)  Mobility Interventions: Patient to call before getting OOB (05/09/18 0715)  Mentation: Alert, oriented x 3 (05/09/18 0715)  Mentation Interventions: Door open when patient unattended (05/09/18 0715)  Medication: Patient receiving anticonvulsants, sedatives(tranquilizers), psychotropics or hypnotics, hypoglycemics, narcotics, sleep aids, antihypertensives, laxatives, or diuretics (05/09/18 0715)  Medication Interventions: Evaluate medications/consider consulting pharmacy; Patient to call before getting OOB (05/09/18 0715)  Elimination: Needs assistance with toileting (05/09/18 0715)  Elimination Interventions: Call light in reach; Patient to call for help with toileting needs (05/09/18 0715)  Prior Fall History: Before admission in past 12 months _home or previous inpatient care) (05/09/18 0715)  History of Falls Interventions: Consult care management for discharge planning;Door open when patient unattended (05/09/18 0715)  Total Score: 4 (05/09/18 0715)  Standard Fall Precautions: Yes (05/03/18 2239)  High Fall Risk: Yes (05/09/18 0715)     Speech Assessment:         Ambulation:  Gait  Distance (ft): 150 Feet (ft) (05/09/18 1000)  Assistive Device:  (no device) (05/09/18 1000)  Rail Use:  (PT assist under B forearm, NWB B wrist) (05/09/18 1000)     Labs/Studies:  Recent Results (from the past 72 hour(s))   PROTHROMBIN TIME + INR    Collection Time: 05/07/18  7:07 AM   Result Value Ref Range    Prothrombin time 28.9 (H) 11.5 - 14.5 sec    INR 2.8     METABOLIC PANEL, BASIC    Collection Time: 05/07/18  7:07 AM   Result Value Ref Range    Sodium 140 136 - 145 mmol/L    Potassium 4.5 3.5 - 5.1 mmol/L    Chloride 99 98 - 107 mmol/L    CO2 34 (H) 21 - 32 mmol/L    Anion gap 7 7 - 16 mmol/L Glucose 128 (H) 65 - 100 mg/dL    BUN 73 (H) 8 - 23 MG/DL    Creatinine 1.99 (H) 0.6 - 1.0 MG/DL    GFR est AA 32 (L) >60 ml/min/1.73m2    GFR est non-AA 26 (L) >60 ml/min/1.73m2    Calcium 9.4 8.3 - 10.4 MG/DL   CBC W/O DIFF    Collection Time: 05/07/18  7:07 AM   Result Value Ref Range    WBC 7.2 4.3 - 11.1 K/uL    RBC 2.80 (L) 4.05 - 5.25 M/uL    HGB 8.3 (L) 11.7 - 15.4 g/dL    HCT 25.6 (L) 35.8 - 46.3 %    MCV 91.4 79.6 - 97.8 FL    MCH 29.6 26.1 - 32.9 PG    MCHC 32.4 31.4 - 35.0 g/dL    RDW 14.5 11.9 - 14.6 %    PLATELET 840 413 - 648 K/uL    MPV 9.8 (L) 10.8 - 14.1 FL   OCCULT BLOOD, STOOL    Collection Time: 05/07/18 10:30 AM   Result Value Ref Range    Occult blood, stool NEGATIVE  NEG     URINALYSIS W/ RFLX MICROSCOPIC    Collection Time: 05/07/18 10:42 AM   Result Value Ref Range    Color YELLOW      Appearance CLEAR      Specific gravity 1.011 1.001 - 1.023      pH (UA) 6.0 5.0 - 9.0      Protein NEGATIVE  NEG mg/dL    Glucose NEGATIVE  mg/dL    Ketone NEGATIVE  NEG mg/dL    Bilirubin NEGATIVE  NEG      Blood NEGATIVE  NEG      Urobilinogen 1.0 0.2 - 1.0 EU/dL    Nitrites NEGATIVE  NEG      Leukocyte Esterase NEGATIVE  NEG     TYPE + CROSSMATCH    Collection Time: 05/07/18 11:09 AM   Result Value Ref Range    Crossmatch Expiration 05/10/2018     ABO/Rh(D) A POSITIVE     Antibody screen NEG     Unit number [id number]     Blood component type  LRIR     Unit division 00     Status of unit TRANSFUSED     Crossmatch result Compatible    PROTHROMBIN TIME + INR    Collection Time: 05/08/18  6:14 AM   Result Value Ref Range    Prothrombin time 28.6 (H) 11.5 - 14.5 sec    INR 2.7     HGB & HCT    Collection Time: 05/08/18  6:14 AM   Result Value Ref Range    HGB 9.2 (L) 11.7 - 15.4 g/dL    HCT 28.2 (L) 35.8 - 46.3 %   PROTHROMBIN TIME + INR    Collection Time: 05/09/18  5:35 AM   Result Value Ref Range    Prothrombin time 26.7 (H) 11.5 - 14.5 sec    INR 2.5     METABOLIC PANEL, BASIC    Collection Time: 05/09/18  5:35 AM   Result Value Ref Range    Sodium 140 136 - 145 mmol/L    Potassium 4.5 3.5 - 5.1 mmol/L    Chloride 100 98 - 107 mmol/L    CO2 32 21 - 32 mmol/L    Anion gap 8 7 - 16 mmol/L    Glucose 132 (H) 65 - 100 mg/dL    BUN 65 (H) 8 - 23 MG/DL    Creatinine 1.65 (H) 0.6 - 1.0 MG/DL    GFR est AA 40 (L) >60 ml/min/1.73m2    GFR est non-AA 33 (L) >60 ml/min/1.73m2    Calcium 9.1 8.3 - 10.4 MG/DL   MAGNESIUM    Collection Time: 05/09/18  5:35 AM   Result Value Ref Range    Magnesium 2.5 (H) 1.8 - 2.4 mg/dL   PHOSPHORUS    Collection Time: 05/09/18  5:35 AM   Result Value Ref Range    Phosphorus 4.3 (H) 2.3 - 3.7 MG/DL       Assessment:     Problem List as of 5/9/2018  Date Reviewed: 4/27/2018          Codes Class Noted - Resolved    * (Principal)Physical debility ICD-10-CM: R53.81  ICD-9-CM: 799.3  5/2/2018 - Present        Closed nondisplaced fracture of shaft of fifth metacarpal bone of left hand ICD-10-CM: S62.357A  ICD-9-CM: 815.03  4/28/2018 - Present        Subdural hematoma (HCC) ICD-10-CM: I62.00  ICD-9-CM: 432.1  4/27/2018 - Present        Long term (current) use of anticoagulants (Chronic) ICD-10-CM: Z79.01  ICD-9-CM: V58.61  4/19/2018 - Present        Dysthymia ICD-10-CM: F34.1  ICD-9-CM: 300.4  4/5/2018 - Present        Type 2 diabetes mellitus with nephropathy (HCC) (Chronic) ICD-10-CM: E11.21  ICD-9-CM: 250.40, 583.81  12/18/2017 - Present        Pulmonary hypertension (HCC) (Chronic) ICD-10-CM: I27.20  ICD-9-CM: 416.8  6/15/2016 - Present        S/P AVR (aortic valve replacement) (Chronic) ICD-10-CM: Z95.2  ICD-9-CM: V43.3  Unknown - Present    Overview Signed 2/23/2016 11:04 AM by Abdulkadir James     Mechanical/Artific             Card[facility] ([facility] (Chronic) ICD-10-CM: I42.9  ICD-9-CM: 425.4  Unknown - Present        Osteopenia ICD-10-CM: M85.80  ICD-9-CM: 733.90  Unknown - Present        HLD (hyperlipidemia) (Chronic) ICD-10-CM: E78.5  ICD-9-CM: 272.4  Unknown - Present Osteoarthritis ICD-10-CM: M19.90  ICD-9-CM: 715.90  Unknown - Present        ICD (implantable cardioverter-defibrillator) in place ICD-10-CM: Z95.810  ICD-9-CM: V45.02  10/2/2014 - Present    Overview Signed 10/2/2014  4:58 PM by Keny Oliver single-chamber ICD implantation 10/20/14             Diabetes mellitus type 2, diet-controlled ([facility] (Chronic) ICD-10-CM: E11.9  ICD-9-CM: 250.00  8/28/2014 - Present        COPD (chronic obstructive pulmonary disease) ([facility] (Chronic) ICD-10-CM: J44.9  ICD-9-CM: 496  4/2/2014 - Present        REJI (obstructive sleep apnea)-cpap (Chronic) ICD-10-CM: G47.33  ICD-9-CM: 327.23  4/2/2014 - Present        CKD (chronic kidney disease) stage 3, GFR 30-59 ml/min (Chronic) ICD-10-CM: N18.3  ICD-9-CM: 585.3  7/10/2013 - Present        Anticoagulated on Coumadin (Chronic) ICD-10-CM: Z51.81, Z79.01  ICD-9-CM: V58.83, V58.61  7/9/2013 - Present    Overview Signed 7/9/2013  4:16 PM by Barrington Aranda NP     S/P AVR             CAD (coronary artery disease) (Chronic) ICD-10-CM: I25.10  ICD-9-CM: 414.00  1/20/2013 - Present    Overview Signed 5/20/2014 10:05 AM by Matt Jenkins NP     5/8/14 PCI LAD with stent placed             Chronic combined systolic and diastolic heart failure (HCC) (Chronic) ICD-10-CM: I50.42  ICD-9-CM: 428.42  1/20/2013 - Present    Overview Addendum 4/28/2018  4:53 AM by Robert Dominguez MD     5/8/14 ECHO:  EF 10-15%  12/2017:  EF 25-30%             Essential hypertension, benign (Chronic) ICD-10-CM: I10  ICD-9-CM: 401.1  1/20/2013 - Present        MDS (myelodysplastic syndrome) (HCC) (Chronic) ICD-10-CM: D46.9  ICD-9-CM: 238.75  12/17/2011 - Present    Overview Addendum 8/29/2013 11:06 AM by Paulino Ojeda started in August, 2011 12/18/11 Procrit weekly and Iron stores. 5-12         12-13-12 good response to 3 weekly procrit did not need it last time    3-7-13 Pt doing well. Just wanted a \"check-up. \" Responding to Procrit every three weeks. 8-29-13 patient has missed some injections on recently restarted hemoglobin only issue , takes oral iron iron stores each time                Iron deficiency anemia due to chronic blood loss (Chronic) ICD-10-CM: D50.0  ICD-9-CM: 280.0  7/29/2009 - Present        RESOLVED: CHF (congestive heart failure) (HCC) ICD-10-CM: I50.9  ICD-9-CM: 428.0  2/17/2017 - 4/27/2018        RESOLVED: Routine general medical examination at a health care facility ICD-10-CM: Z00.00  ICD-9-CM: V70.0  12/16/2016 - 4/27/2018        RESOLVED: Chest pain, unspecified ICD-10-CM: R07.9  ICD-9-CM: 786.50  12/7/2016 - 12/16/2016        RESOLVED: Chronic left-sided low back pain without sciatica ICD-10-CM: M54.5, G89.29  ICD-9-CM: 724.2, 338.29  6/15/2016 - 4/27/2018        RESOLVED: Tachycardia ICD-10-CM: R00.0  ICD-9-CM: 785.0  Unknown - 12/16/2016    Overview Signed 2/23/2016 11:02 AM by Low Marc H/B              RESOLVED: Chronic respiratory failure (HCC) (Chronic) ICD-10-CM: J96.10  ICD-9-CM: 518.83  4/2/2014 - 4/27/2018              Plan:   S/p fall with bilateral hand fxs, right knee sprain, FELIZ on CKD, anemia with underlying dx of MDS; physical debility       Continue daily physician medical management:  Pneumonia prophylaxis- Incentive spirometer every hour while awake      Digit fxs both hands; cont splint/rachel tape; only restriction is no lifting; has chronic RUE weakness from what I suspect is a RTC injury; xray neg; OT working on modalities      COPD/REJI; pt is on [employer] at [employer]. She manages this at home but having difficulty do to fxs in her hands  -cont Proventil , Pulmicort; Spiriva;  try weaning daytime O2; currently 4-6L due to hypoxia.  Doesn't use during day at home per pt; 5/3 NOW reports chronic 3L at home, thus at baseline  -5/3 down from 4 to 2L , sats 98%; 5/4 on 3L  sats 96% (obviously needs due to drop noted to 65% sats when O2 accidentally not hooked up last noc and pt symptomatic with chest pain which resolved immediately when placed back on O2  -5/5 comfortable. No PRUETT, on home 3L NC, lowest sat 91%      Severe CHF/cardiomyopathy; compensated currently; cont Coreg, Lasix; has an ICD ; on entresto as well, 5/3  -5/3 no evidence of acute issues; 5/4 compensated well and considering dec Lasix  -5/5 recheck renal fx, may dec lasix  -5/8 dec Lasix due to FELIZ on CKD; compensated; holding entresto      DVT risk / DVT Prophylaxis- Will require daily physician exam to assess for signs and symptoms as patient is at increased risk for of thromboembolism. Mobilization as tolerated. Intermittent pneumatic compression devices when in bed Thigh-high or knee-high thromboembolic deterrent hose when out of bed. On therapeutic Coumadin       Pain Control: ongoing significant pain in back, shoulders, knees and hands which is stable and controlled by PRN meds. Will require regular pain assessment and comprenhensive pain management,   -has end stage OA of the knees and now with right knee sprain; ice/ace wrap, modalities      Wound Care: Monitor wound status daily per staff and physician. At risk for failure. Will require 24/7 rehab nursing. None needed but at risk due to immobility   -5/3 hematoma dorsum right hand; warm compress      Hypertension - BP uncontrolled, fluctuating, managed medically.  Has hypotension, asymptomatic, parameters with meds   -5/3 119-141 SBP, controlled; 5/4 /66 -; on Coreg, Lasix and Entresto; CHF well compensated; consider decreasing lasix which is dosed at 80mg bid  5/5 bp 141/60; 5/6 117/78 ; 5/9 elevated today 156/83       Scleral hemorrhage; clinically unchanged but less periocular swelling      MDS/chronic anemia; monitor , replace as needed, consider epogen  -5/3 hgb stable at 8.4; 5/4 consider dose of epogen but I believe there are restrictions to ordering and may need hematology, will increase iron due to deficiency; recheck 5/6; 8.1  -consult hematology Mond if labs concerning; looking back at past notes; was on weekly procrit if hgb < 10; will order  -transf 1 unit 5/7 ; hbg 9.2 from 8.3; cont epogen weekly. F/u with hematology      FELIZ on CKD; improving, monitor labs and adjust meds; daily wt due to CHF  -creat slowly improving 5/3; 1.53 from 1.71 yesterday, BUN remains unchanged, 50's; monitor labs 3x/wk  -5/7 creat up; push fluids  -5/8 start 1/4 NS at 100 x 2 bags, creat 1.99 with bun 70s; dec Lasix and hold entresto  -5/9 Creat 1.65, bun 65; cont IVFs x 2 bags and hold entresto and cont dec dose Lasix.       Chronic coumadin therapy due to mech AVR; goal INR 2-3; inr 2.9; monitor and have pharmacy assist in dosing;   5/4 INR therap at 2.7 on home alternating doses of coumadin (2.5mg M/F and 5mg S/Tue/W/TH/SAT)  -5/9 remains therapeutic on coumadin per Pharmacy dosing; appreciate      ? Hx of DM listed but on no medications ; do have on diet restrictions; carbohydrate restrictions      Urinary retention/ neurogenic bladder - schedule voids q6-8 hrs. Check post-void residual as needed; In and Out catheterize if post-void residual is more than 400 cc.  -5/4 no issues      bowel program - constipation, added bowel program; 5/4 continent; no const/diarr      GERD - resume PPI. At times may need additional antacids, Maalox prn.                 Time spent was 25 minutes with over 1/2 in direct patient care/examination, consultation and coordination of care.      Signed By: Radha Whiting MD     May 9, 2018

## 2018-05-10 LAB
ANION GAP SERPL CALC-SCNC: 7 MMOL/L (ref 7–16)
BUN SERPL-MCNC: 58 MG/DL (ref 8–23)
CALCIUM SERPL-MCNC: 9.6 MG/DL (ref 8.3–10.4)
CHLORIDE SERPL-SCNC: 101 MMOL/L (ref 98–107)
CO2 SERPL-SCNC: 33 MMOL/L (ref 21–32)
CREAT SERPL-MCNC: 1.46 MG/DL (ref 0.6–1)
GLUCOSE SERPL-MCNC: 120 MG/DL (ref 65–100)
HCT VFR BLD AUTO: 30.1 % (ref 35.8–46.3)
HGB BLD-MCNC: 9.5 G/DL (ref 11.7–15.4)
POTASSIUM SERPL-SCNC: 4.3 MMOL/L (ref 3.5–5.1)
SODIUM SERPL-SCNC: 141 MMOL/L (ref 136–145)

## 2018-05-10 PROCEDURE — 97530 THERAPEUTIC ACTIVITIES: CPT

## 2018-05-10 PROCEDURE — 94760 N-INVAS EAR/PLS OXIMETRY 1: CPT

## 2018-05-10 PROCEDURE — 74011000250 HC RX REV CODE- 250: Performed by: PHYSICAL MEDICINE & REHABILITATION

## 2018-05-10 PROCEDURE — 80048 BASIC METABOLIC PNL TOTAL CA: CPT | Performed by: PHYSICAL MEDICINE & REHABILITATION

## 2018-05-10 PROCEDURE — 97116 GAIT TRAINING THERAPY: CPT

## 2018-05-10 PROCEDURE — 65310000000 HC RM PRIVATE REHAB

## 2018-05-10 PROCEDURE — 36415 COLL VENOUS BLD VENIPUNCTURE: CPT | Performed by: PHYSICAL MEDICINE & REHABILITATION

## 2018-05-10 PROCEDURE — 74011250637 HC RX REV CODE- 250/637: Performed by: PHYSICAL MEDICINE & REHABILITATION

## 2018-05-10 PROCEDURE — 94640 AIRWAY INHALATION TREATMENT: CPT

## 2018-05-10 PROCEDURE — 99232 SBSQ HOSP IP/OBS MODERATE 35: CPT | Performed by: PHYSICAL MEDICINE & REHABILITATION

## 2018-05-10 PROCEDURE — 94660 CPAP INITIATION&MGMT: CPT

## 2018-05-10 PROCEDURE — 97110 THERAPEUTIC EXERCISES: CPT

## 2018-05-10 PROCEDURE — 85014 HEMATOCRIT: CPT | Performed by: PHYSICAL MEDICINE & REHABILITATION

## 2018-05-10 PROCEDURE — 77010033678 HC OXYGEN DAILY

## 2018-05-10 PROCEDURE — 97535 SELF CARE MNGMENT TRAINING: CPT

## 2018-05-10 RX ORDER — FUROSEMIDE 40 MG/1
80 TABLET ORAL DAILY
Status: DISCONTINUED | OUTPATIENT
Start: 2018-05-11 | End: 2018-05-14

## 2018-05-10 RX ADMIN — BUDESONIDE 500 MCG: 0.5 INHALANT RESPIRATORY (INHALATION) at 05:25

## 2018-05-10 RX ADMIN — OXYCODONE HYDROCHLORIDE 10 MG: 5 TABLET ORAL at 07:07

## 2018-05-10 RX ADMIN — CARVEDILOL 3.12 MG: 3.12 TABLET, FILM COATED ORAL at 17:30

## 2018-05-10 RX ADMIN — SENNOSIDES 8.6 MG: 8.6 TABLET, FILM COATED ORAL at 08:23

## 2018-05-10 RX ADMIN — FERROUS SULFATE TAB 325 MG (65 MG ELEMENTAL FE) 325 MG: 325 (65 FE) TAB at 17:30

## 2018-05-10 RX ADMIN — SACUBITRIL AND VALSARTAN 1 TABLET: 24; 26 TABLET, FILM COATED ORAL at 17:31

## 2018-05-10 RX ADMIN — BUDESONIDE 500 MCG: 0.5 INHALANT RESPIRATORY (INHALATION) at 18:00

## 2018-05-10 RX ADMIN — SIMVASTATIN 20 MG: 20 TABLET, FILM COATED ORAL at 22:00

## 2018-05-10 RX ADMIN — ESCITALOPRAM 10 MG: 10 TABLET, FILM COATED ORAL at 08:26

## 2018-05-10 RX ADMIN — FERROUS SULFATE TAB 325 MG (65 MG ELEMENTAL FE) 325 MG: 325 (65 FE) TAB at 08:23

## 2018-05-10 RX ADMIN — WARFARIN SODIUM 5 MG: 2.5 TABLET ORAL at 17:30

## 2018-05-10 RX ADMIN — ALBUTEROL SULFATE 2.5 MG: 2.5 SOLUTION RESPIRATORY (INHALATION) at 05:25

## 2018-05-10 RX ADMIN — CARVEDILOL 3.12 MG: 3.12 TABLET, FILM COATED ORAL at 08:22

## 2018-05-10 RX ADMIN — ALBUTEROL SULFATE 2.5 MG: 2.5 SOLUTION RESPIRATORY (INHALATION) at 18:00

## 2018-05-10 RX ADMIN — OXYCODONE HYDROCHLORIDE 10 MG: 5 TABLET ORAL at 19:14

## 2018-05-10 RX ADMIN — POLYETHYLENE GLYCOL (3350) 17 G: 17 POWDER, FOR SOLUTION ORAL at 08:25

## 2018-05-10 RX ADMIN — FUROSEMIDE 60 MG: 40 TABLET ORAL at 08:22

## 2018-05-10 RX ADMIN — ALBUTEROL SULFATE 2.5 MG: 2.5 SOLUTION RESPIRATORY (INHALATION) at 12:00

## 2018-05-10 NOTE — PROGRESS NOTES
Pt has had a good day . No complaints at this time . Resting in her recliner with daughter at bedside. Hourly rounds made  On pt no complaints noted .

## 2018-05-10 NOTE — PROGRESS NOTES
Problem: Falls - Risk of  Goal: *Absence of Falls  Document Tia Fall Risk and appropriate interventions in the flowsheet. Outcome: Progressing Towards Goal  Fall Risk Interventions:  Mobility Interventions: Patient to call before getting OOB    Mentation Interventions: Door open when patient unattended    Medication Interventions: Evaluate medications/consider consulting pharmacy    Elimination Interventions: Call light in reach    History of Falls Interventions: Consult care management for discharge planning, Door open when patient unattended        Problem: Pressure Injury - Risk of  Goal: *Prevention of pressure injury  Document Bahman Scale and appropriate interventions in the flowsheet.    Outcome: Progressing Towards Goal  Pressure Injury Interventions:       Moisture Interventions: Absorbent underpads    Activity Interventions: Increase time out of bed    Mobility Interventions: HOB 30 degrees or less    Nutrition Interventions: Document food/fluid/supplement intake    Friction and Shear Interventions: Apply protective barrier, creams and emollients

## 2018-05-10 NOTE — PROGRESS NOTES
End Of Shift Functional Summary, Nursing            ef AND/OR the clothing/linen requires changing/cleaning up.)    BOWEL:  Pt {DOES/DOES NOT/WILD CARD (D):52603} take medication. Pt is {CONTINENT/INCONTINENT:84556000} of bowel and uses {[facility] VOIDING METHOD:82301933}. Pt requires staff to {[facility] BLADDER ASSISTANCE:23768438}    Pt has had {NUMBERS 1-10:08819} bowel accidents during this shift requiring {ASSISTANCE:14596227} from staff to clean up. (An accident is when the episode is not contained in a brief AND/OR the clothing/linen requires changing/cleaning up.)    BED/CHAIR TRANSFER  Pt requires {ASSISTANCE:11099794}. Pt uses {IR CANE/WALKER:80238250}  Does the patient require extra time? {yes no:33844}  Does the patient require contact guard assistance? {yes no:88239}  Does the patient require more than one staff member for assistance? {yes no:12967}    BATHING  Pt requires assistance with {AREA:76671340}  Does the patient require extra time? {yes no:08232}  Does the patient require standby assistance? {yes no:53194}  Does the patient require contact guard assistance? {yes no:74495}  Does the patient require more than one staff member for assistance? {yes no:66362}      SHOWER TRANSFER:    Pt requires {ASSISTANCE:71381323}  Pt uses {DEVICES:71560983}. Does the patient require extra time? {yes no:42295}  Does the patient require contact guard assistance? {yes no:61444}  Does the patient require more than one staff member for assistance? {yes no:62563}    DRESSING  UPPER BODY:  Pt requires {DRESSIN} Pt requires {ASSISTANCE:44630159}. Does the patient require more than one staff member for assistance? {yes no:13153}    LOWER BODY:  Pt requires {DRESSIN}. Pt requires {ASSISTANCE:89469918}  Does the patient require more than one staff member for assistance? {yes no:82736}    EATING  Pt requires {IR NOIFKV:13508315}. Pt {IRC WEARS/DOES NOT VSMV:12243620} dentures.   TUBE FEEDINGS:  Pt {does/does not:362796} receive nutrition through tube feedings. Patient requires {[facility] TUBE VBLDYKC:14388080} with feedings. GROOMING  Pt requires {IR GROOMIN} with {IR GROOMING ASSISTANCE:04316382}. Documentation reviewed and plan of care discussed/reviewed with   {IRC DISCUSSED RLJO:40355987} during the shift.

## 2018-05-10 NOTE — PROGRESS NOTES
OT Daily Note    Time In 1115   Time Out 1159     Subjective:\"I had a dream that it was lunch time\" Agreeable to therapy. Pain:tolerated better in R shoulder, but still not able to perform PROM this date due to complaints from session tuesday. Education:On s/u at home for eating. Interdisciplinary Communication: Collaborated with Jennifer Hernadez and patient is making progress towards goals. Precautions: Other (comment) (fall precautions; NWB LUE)    Balance/functional mobility Daily Assessment   Ambulated 20' x 2 with SBA no device. R UE Daily Assessment   Added biofreeze start of session for pain relief on [address] joint and scapula. Also only complete light tissue work on scapula and [address] for pain relief with good results this session. Addition of some scapular depression all passively and slowly. AROM wrist extension and flexion within limits of pain. [facility] Daily Assessment   Completed smaller wooden pegs closer to the size of large markers to complete with LUE for [facility], able to use thumb and first digit to place 24 pegs (12 more than yesterday), also incorporated right hand to help manipulate pegs in left hand. Also able to put away all pegs with LUE. Feeding Daily Assessment   Set up with same DME as earlier notes for feeding, improving quality. Ended session:In recliner, setup and eating lunch.      Laverne LOFTON/DEBRA

## 2018-05-10 NOTE — PROGRESS NOTES
Subjective \"I have fun wherever I go. \"   Activity Sorting activity with the use of her LUE. Strength/Endurance Patient tolerated the session with rest breaks needed throughout the session for her LUE. Balance Min (A) with SPT from w/c to toilet    Social Interaction Patient was friendly and joked appropriately with this therapist.   Cognitive A&O X4   Comments Patient was on 3 liter O2 via nasal canula with no signs or c/o shortness of breath.      Pietro Case, CTRS

## 2018-05-10 NOTE — PROGRESS NOTES
PHYSICAL THERAPY DAILY NOTE  Time In: 5630  Time Out: 0198  Patient Seen For: AM;Gait training;Patient education;Transfer training; Other (see progress notes)    Subjective: Patient reporting she feels a little stronger. Reports getting up from recliner chair is getting easier. Objective:Vital Signs:patient on 02 at 3 lpm, 02 sat 89 to 92% at rest, 02 sat 85% after going up steps and 86% after going down steps, 02 sat 87 to 88% after ambulating 60  Patient Vitals for the past 12 hrs:   Temp Pulse Resp BP SpO2   05/10/18 0730 98.6 °F (37 °C) 69 18 119/81 98 %   05/10/18 0525 - - - - 97 %     Pain level:2 to 5 out of 10  Pain location:lateral aspect of both hands and right knee  Pain interventions:Pain medication per RN, rest, positioning,body mechanics    Patient education:Breathing techniques during functional mobilitytransfer training, gait training, fall precautions, activity pacing, body mechanics, energy conservation, Patient verbalizing understanding and demonstrating understanding of patient education. Recommend follow up education.     Interdisciplinary Communication:Spoke with RN regarding pain medication    Other (comment) (fall precautions, NWB Left hand)  GROSS ASSESSMENT Daily Assessment     NA       BED/MAT MOBILITY Daily Assessment    Rolling Right : 0 (Not tested)  Rolling Left : 0 (Not tested)  Supine to Sit : 0 (Not tested)  Sit to Supine : 0 (Not tested)       TRANSFERS Daily Assessment    Transfer Type: SPT without device  Transfer Assistance : 5 (Supervision/setup)  Sit to Stand Assistance: Stand-by assistance  Car Transfers: Not tested       GAIT Daily Assessment   Gait training with cont step through gait pattern with cues for breathing pattern and cues for managing 02 line Amount of Assistance: 5 (Supervision/setup)  Distance (ft): 60 Feet (ft) (60ft x 2 with 5 minute sitting rest break)  Assistive Device:  (No device)   Gait training up/down 20 ft ramp with SBA and cues for managing 02 line.     STEPS or STAIRS Daily Assessment   Single step at a time leading up with LLE and down with RLE, 5 minute sitting rest break after going up ramp. Increased time and effort to complete with cues for sequencing. Increased trunk flexion going up/down steps. Visual deficits make it more difficult for patient when going down steps Steps/Stairs Ambulated (#): 4  Level of Assist : 3 (Moderate assistance)  Rail Use:  (None, patient forearms supported by PT)       BALANCE Daily Assessment    Sitting - Static: Good (unsupported)  Sitting - Dynamic: Good (unsupported)  Standing - Static: Good  Standing - Dynamic : Impaired       WHEELCHAIR MOBILITY Daily Assessment    Curbs/Ramps Assist Required (FIM Score): 0 (Not tested)  Wheelchair Setup Assist Required : 0 (Not tested)       LOWER EXTREMITY EXERCISES Daily Assessment     NA          Assessment: Body mechanics with sit to stand slowly improving but cont to be altered due to inability to use UEs to assist with sit to stand. 02 sat cont to decrease into mid to high 80s during functional mobility but recovers to 90% with 1 to 2 minutes of rest. Breathing pattern during functional mobility improving. Improving with ability to manage 02 line       Patient returned to room at end of treatment and remained up in recliner with LEs elevated and with needs in reach. 02 at 3 lpm    Plan of Care: Continue with POC and progress as tolerated.      Gavi Jiménez, PT  5/10/2018

## 2018-05-10 NOTE — PROGRESS NOTES
PHYSICAL THERAPY DAILY NOTE  Time In: 7221  Time Out: 3090  Patient Seen For: PM;Gait training;Patient education; Therapeutic exercise;Transfer training; Other (see progress notes)    Subjective: Patient reporting she is tired this PM. Reports she is unable to stand up from w/c without assistance this PM due to fatigue. Objective:Vital Signs:  Patient Vitals for the past 12 hrs:   Temp Pulse Resp BP SpO2   05/10/18 1547 97.5 °F (36.4 °C) 71 16 118/68 96 %   05/10/18 1200 - - - - 97 %   05/10/18 0730 98.6 °F (37 °C) 69 18 119/81 98 %   05/10/18 0525 - - - - 97 %     Pain level:4 out of 10  Pain location:right knee and lateral aspect of both hands  Pain interventions:Pain medication per RN, rest, positioning,body mechanics, ROM for right knee    Patient education:Breathing techniques,transfer training, gait training, fall precautions, activity pacing, body mechanics,energy conservation, Patient verbalizing understanding and demonstrating  understanding of patient education. Recommend follow up education.     Interdisciplinary Communication:NA    Other (comment) (fall precautions, NWB Left hand)  GROSS ASSESSMENT Daily Assessment     NA       BED/MAT MOBILITY Daily Assessment    Rolling Right : 0 (Not tested)  Rolling Left : 0 (Not tested)  Supine to Sit : 0 (Not tested)  Sit to Supine : 0 (Not tested)       TRANSFERS Daily Assessment   Increased time and effort to complete with cues for body mechanics  Min assist with sit<>stand from w/c and from recliner Transfer Type: SPT without device  Transfer Assistance : 5 (Supervision/setup)  Sit to Stand Assistance: Minimal assistance  Car Transfers: Not tested       GAIT Daily Assessment    Amount of Assistance: 5 (Supervision/setup)  Distance (ft): 50 Feet (ft)  Assistive Device:  (No device)   Gait training on concrete surfaces outside with cues for managing 02 line    STEPS or STAIRS Daily Assessment    Steps/Stairs Ambulated (#): 0  Level of Assist : 0 (Not tested)  Rail Use:  (None, patient forearm supported by PT)       BALANCE Daily Assessment    Sitting - Static: Good (unsupported)  Sitting - Dynamic: Good (unsupported)  Standing - Static: Good  Standing - Dynamic : Impaired       WHEELCHAIR MOBILITY Daily Assessment    Curbs/Ramps Assist Required (FIM Score): 0 (Not tested)  Wheelchair Setup Assist Required : 0 (Not tested)       LOWER EXTREMITY EXERCISES Daily Assessment    Extremity: Both  Exercise Type #1: Seated lower extremity strengthening  Sets Performed: 1  Reps Performed: 20  Level of Assist: Minimal assistance     SEATED EXERCISES Sets Reps Comments   Ankle Pumps 1 20    Hip Flexion 2 10    Long Arc Quads 2 10    Hip Adduction/Ball Squeeze 1 20    Hip Abduction with red Theraband 1 20    Hamstring Curls with red Theraband 1 20    Hip Extension with red Theraband 1 20             Assessment: Increased assist required with sit to stand due to fatigue. Tendency to fatigue in PM after full morning of therapy. Ongoing difficulty with sit to stand due to LE weakness and inability to use UEs for sit to stand       Patient returned to room at end of treatment and remained up in recliner with LEs elevated and with needs in reach. 02 at 3 lpm    Plan of Care: Continue with POC and progress as tolerated.      Omid Barnes, PT  5/10/2018

## 2018-05-10 NOTE — PROGRESS NOTES
Problem: Falls - Risk of  Goal: *Absence of Falls  Document Tia Fall Risk and appropriate interventions in the flowsheet. Outcome: Progressing Towards Goal  Fall Risk Interventions:  Mobility Interventions: Patient to call before getting OOB    Mentation Interventions: Door open when patient unattended    Medication Interventions: Evaluate medications/consider consulting pharmacy, Patient to call before getting OOB    Elimination Interventions: Call light in reach, Patient to call for help with toileting needs    History of Falls Interventions: Consult care management for discharge planning, Door open when patient unattended        Problem: Pressure Injury - Risk of  Goal: *Prevention of pressure injury  Document Bahman Scale and appropriate interventions in the flowsheet.    Pressure Injury Interventions:       Moisture Interventions: Absorbent underpads    Activity Interventions: Increase time out of bed, Pressure redistribution bed/mattress(bed type)    Mobility Interventions: HOB 30 degrees or less, Pressure redistribution bed/mattress (bed type)    Nutrition Interventions: Document food/fluid/supplement intake    Friction and Shear Interventions: Apply protective barrier, creams and emollients, HOB 30 degrees or less

## 2018-05-10 NOTE — PROGRESS NOTES
05/10/18 1012   Time Spent With Patient   Time In 0700   Time Out 0746   Patient Seen For: AM;ADLs   Feeding/Eating   Feeding/Eating Assistance S   Grooming   Grooming Assistance  SBA   Upper Body Bathing   Bathing Assistance, Upper Mod A   Position Performed Seated in chair   Lower Body Bathing   Bathing Assistance, Lower  Mod A   Position Performed Seated in chair   Upper Body Dressing    Dressing Assistance  Max A   Lower Body Dressing    Dressing Assistance  Max A     S: \"I dont need to shower today, lets just wash by the sink. \" Agreeable to therapy. Focus of session was on morning ADL routine. Patient was able to ambulate ~10 feet using a HHA with close SBA. Pain not indicated during session. Collaborated with PT, Byron Power and confirmed patient is on track to reach goals as documented in the care plan. Patient tolerated session well, but strength, pain, ROM, activity tolerance, limited hand use are still below baseline and requires skilled facilitation to successfully and safely complete ADL's and transfers. Patient ended session in recliner, setup for breakfast with adaptive equipment, with call remote and phone within reach.      Naila Lao, SAMIRR

## 2018-05-10 NOTE — PROGRESS NOTES
Christophe Seip, MD,   Medical Director  [address] [facility], [address] [facility]  Tel: [phone]       [facility] PROGRESS NOTE    Rodger Corrales  Admit Date: 5/2/2018  Admit Diagnosis: Debillity ; Physical debility    Subjective   Says she is moving fingers better. RUE is not very functional prior to fall due to right shoulder issues.  No cp, +PRUETT but no different than normal. Pain better controlled    Objective:     Current Facility-Administered Medications   Medication Dose Route Frequency    [START ON 5/11/2018] furosemide (LASIX) tablet 80 mg  80 mg Oral DAILY    0.45% sodium chloride infusion  100 mL/hr IntraVENous CONTINUOUS    0.9% sodium chloride infusion 250 mL  250 mL IntraVENous PRN    diphenhydrAMINE (BENADRYL) capsule 25 mg  25 mg Oral Q6H PRN    epoetin jairo (EPOGEN;PROCRIT) injection 4,000 Units  4,000 Units SubCUTAneous Q7D    ferrous sulfate tablet 325 mg  1 Tab Oral BID WITH MEALS    acetaminophen (TYLENOL) tablet 650 mg  650 mg Oral Q4H PRN    bisacodyl (DULCOLAX) tablet 5 mg  5 mg Oral DAILY PRN    LORazepam (ATIVAN) tablet 1 mg  1 mg Oral BID PRN    naloxone (NARCAN) injection 0.4 mg  0.4 mg IntraVENous PRN    prochlorperazine (COMPAZINE) injection 5 mg  5 mg IntraVENous Q8H PRN    sodium chloride (NS) flush 5-10 mL  5-10 mL IntraVENous PRN    albuterol (PROVENTIL VENTOLIN) nebulizer solution 2.5 mg  2.5 mg Nebulization Q4H PRN    bisacodyl (DULCOLAX) suppository 10 mg  10 mg Rectal DAILY PRN    budesonide (PULMICORT) 500 mcg/2 ml nebulizer suspension  500 mcg Nebulization BID RT    And    albuterol (PROVENTIL VENTOLIN) nebulizer solution 2.5 mg  2.5 mg Nebulization Q6HWA RT    carvedilol (COREG) tablet 3.125 mg  3.125 mg Oral BID WITH MEALS    escitalopram oxalate (LEXAPRO) tablet 10 mg  10 mg Oral DAILY    loratadine (CLARITIN) tablet 10 mg  10 mg Oral DAILY PRN    oxyCODONE IR (ROXICODONE) tablet 10 mg  10 mg Oral Q4H PRN    polyethylene glycol (MIRALAX) packet 17 g  17 g Oral DAILY    sacubitril-valsartan (ENTRESTO) 24-26 mg tablet 1 Tab  1 Tab Oral BID    senna (SENOKOT) tablet 8.6 mg  1 Tab Oral DAILY    simvastatin (ZOCOR) tablet 20 mg  20 mg Oral QHS    tiotropium (SPIRIVA) inhalation capsule 18 mcg  1 Cap Inhalation DAILY    traMADol (ULTRAM) tablet 50 mg  50 mg Oral Q6H PRN    warfarin (COUMADIN) tablet 2.5 mg  2.5 mg Oral Once per day on Mon Fri    warfarin (COUMADIN) tablet 5 mg  5 mg Oral Once per day on Sun Tue Wed Thu Sat     Review of Systems:Denies chest pain, shortness of breath, cough, headache, visual problems, abdominal pain, dysurea, calf pain. Pertinent positives are as noted in the medical records and unremarkable otherwise. Visit Vitals    /81    Pulse 69    Temp 98.6 °F (37 °C)    Resp 18    Ht 5' 2\" (1.575 m)    Wt 223 lb (101.2 kg)    SpO2 97%    Breastfeeding No    BMI 40.79 kg/m2        Physical Exam:   General: Alert and age appropriately oriented. No acute cardio respiratory distress. HEENT: Normocephalic, conjunctival hemorr , less ecchymosis   Oral mucosa moist without cyanosis   Lungs: Clear to auscultation  bilaterally. Respiration even and unlabored   Heart: Regular rate and rhythm, S1, S2   No  murmurs, clicks, rub or gallops   Abdomen: Soft, non-tender, nondistended. Bowel sounds present. No organomegaly. obese   Genitourinary: defer   Neuromuscular:      NT , pt in wc with therapists; hip flexion remains weak, right knee ROM improved   Skin/extremity: No rashes, no erythema.  No calf tenderness BLE                                                                              Functional Assessment:          Balance  Sitting - Static: Good (unsupported) (05/10/18 0900)  Sitting - Dynamic: Good (unsupported) (05/10/18 0900)  Standing - Static: Good (05/10/18 0900)  Standing - Dynamic : Impaired (05/10/18 0900)       Feeding  Adaptive Equipment: Non-skid surface;Built up spoon;Plate guard (61/16/92 1014)             Hamp Chasidy Fall Risk Assessment:  Hamp Chasidy Fall Risk  Mobility: Ambulates or transfers with assist devices or assistance (05/10/18 0943)  Mobility Interventions: Patient to call before getting OOB (05/10/18 0943)  Mentation: Alert, oriented x 3 (05/10/18 5192)  Mentation Interventions: Door open when patient unattended (05/10/18 0943)  Medication: Patient receiving anticonvulsants, sedatives(tranquilizers), psychotropics or hypnotics, hypoglycemics, narcotics, sleep aids, antihypertensives, laxatives, or diuretics (05/10/18 0943)  Medication Interventions: Evaluate medications/consider consulting pharmacy (05/10/18 6678)  Elimination: Needs assistance with toileting (05/10/18 7372)  Elimination Interventions: Call light in reach (05/10/18 0943)  Prior Fall History: Before admission in past 12 months _home or previous inpatient care) (05/10/18 1843)  History of Falls Interventions: Consult care management for discharge planning;Door open when patient unattended (05/09/18 0715)  Total Score: 4 (05/10/18 0943)  Standard Fall Precautions: Yes (05/03/18 2239)  High Fall Risk: Yes (05/10/18 0943)     Speech Assessment:         Ambulation:  Gait  Distance (ft): 60 Feet (ft) (60ft x 2 with 5 minute sitting rest break) (05/10/18 0900)  Assistive Device:  (No device) (05/10/18 0900)  Rail Use:  (None, patient forearm supported by PT) (05/10/18 0900)     Labs/Studies:  Recent Results (from the past 72 hour(s))   PROTHROMBIN TIME + INR    Collection Time: 05/08/18  6:14 AM   Result Value Ref Range    Prothrombin time 28.6 (H) 11.5 - 14.5 sec    INR 2.7     HGB & HCT    Collection Time: 05/08/18  6:14 AM   Result Value Ref Range    HGB 9.2 (L) 11.7 - 15.4 g/dL    HCT 28.2 (L) 35.8 - 46.3 %   PROTHROMBIN TIME + INR    Collection Time: 05/09/18  5:35 AM   Result Value Ref Range    Prothrombin time 26.7 (H) 11.5 - 14.5 sec    INR 2.5     METABOLIC PANEL, BASIC    Collection Time: 05/09/18 5:35 AM   Result Value Ref Range    Sodium 140 136 - 145 mmol/L    Potassium 4.5 3.5 - 5.1 mmol/L    Chloride 100 98 - 107 mmol/L    CO2 32 21 - 32 mmol/L    Anion gap 8 7 - 16 mmol/L    Glucose 132 (H) 65 - 100 mg/dL    BUN 65 (H) 8 - 23 MG/DL    Creatinine 1.65 (H) 0.6 - 1.0 MG/DL    GFR est AA 40 (L) >60 ml/min/1.73m2    GFR est non-AA 33 (L) >60 ml/min/1.73m2    Calcium 9.1 8.3 - 10.4 MG/DL   MAGNESIUM    Collection Time: 05/09/18  5:35 AM   Result Value Ref Range    Magnesium 2.5 (H) 1.8 - 2.4 mg/dL   PHOSPHORUS    Collection Time: 05/09/18  5:35 AM   Result Value Ref Range    Phosphorus 4.3 (H) 2.3 - 3.7 MG/DL   METABOLIC PANEL, BASIC    Collection Time: 05/10/18  6:10 AM   Result Value Ref Range    Sodium 141 136 - 145 mmol/L    Potassium 4.3 3.5 - 5.1 mmol/L    Chloride 101 98 - 107 mmol/L    CO2 33 (H) 21 - 32 mmol/L    Anion gap 7 7 - 16 mmol/L    Glucose 120 (H) 65 - 100 mg/dL    BUN 58 (H) 8 - 23 MG/DL    Creatinine 1.46 (H) 0.6 - 1.0 MG/DL    GFR est AA 46 (L) >60 ml/min/1.73m2    GFR est non-AA 38 (L) >60 ml/min/1.73m2    Calcium 9.6 8.3 - 10.4 MG/DL   HGB & HCT    Collection Time: 05/10/18  6:10 AM   Result Value Ref Range    HGB 9.5 (L) 11.7 - 15.4 g/dL    HCT 30.1 (L) 35.8 - 46.3 %       Assessment:     Problem List as of 5/10/2018  Date Reviewed: 4/27/2018          Codes Class Noted - Resolved    * (Principal)Physical debility ICD-10-CM: R53.81  ICD-9-CM: 799.3  5/2/2018 - Present        Closed nondisplaced fracture of shaft of fifth metacarpal bone of left hand ICD-10-CM: S62.357A  ICD-9-CM: 815.03  4/28/2018 - Present        Subdural hematoma (HCC) ICD-10-CM: I62.00  ICD-9-CM: 432.1  4/27/2018 - Present        Long term (current) use of anticoagulants (Chronic) ICD-10-CM: Z79.01  ICD-9-CM: V58.61  4/19/2018 - Present        Dysthymia ICD-10-CM: F34.1  ICD-9-CM: 300.4  4/5/2018 - Present        Type 2 diabetes mellitus with nephropathy (HCC) (Chronic) ICD-10-CM: E11.21  ICD-9-CM: 250.40, 583.81 12/18/2017 - Present        Pulmonary hypertension (HCC) (Chronic) ICD-10-CM: I27.20  ICD-9-CM: 416.8  6/15/2016 - Present        S/P AVR (aortic valve replacement) (Chronic) ICD-10-CM: Z95.2  ICD-9-CM: V43.3  Unknown - Present    Overview Signed 2/23/2016 11:04 AM by Magaly Cantrell     Mechanical/Artific             Cardiomyopathy [facility]) (Chronic) ICD-10-CM: I42.9  ICD-9-CM: 425.4  Unknown - Present        Osteopenia ICD-10-CM: M85.80  ICD-9-CM: 733.90  Unknown - Present        HLD (hyperlipidemia) (Chronic) ICD-10-CM: E78.5  ICD-9-CM: 272.4  Unknown - Present        Osteoarthritis ICD-10-CM: M19.90  ICD-9-CM: 715.90  Unknown - Present        ICD (implantable cardioverter-defibrillator) in place ICD-10-CM: Z95.810  ICD-9-CM: V45.02  10/2/2014 - Present    Overview Signed 10/2/2014  4:58 PM by Mary Ng III     Biotronik single-chamber ICD implantation 10/20/14             Diabetes mellitus type 2, diet-controlled ([facility] [address] (Chronic) ICD-10-CM: E11.9  ICD-9-CM: 250.00  8/28/2014 - Present        COPD (chronic obstructive pulmonary disease) ([facility] (Chronic) ICD-10-CM: J44.9  ICD-9-CM: 842  4/2/2014 - Present        REJI (obstructive sleep apnea)-cpap (Chronic) ICD-10-CM: G47.33  ICD-9-CM: 327.23  4/2/2014 - Present        CKD (chronic kidney disease) stage 3, GFR 30-59 ml/min (Chronic) ICD-10-CM: N18.3  ICD-9-CM: 585.3  7/10/2013 - Present        Anticoagulated on Coumadin (Chronic) ICD-10-CM: Z51.81, Z79.01  ICD-9-CM: V58.83, V58.61  7/9/2013 - Present    Overview Signed 7/9/2013  4:16 PM by Fer Samuels NP     S/P AVR             CAD (coronary artery disease) (Chronic) ICD-10-CM: I25.10  ICD-9-CM: 414.00  1/20/2013 - Present    Overview Signed 5/20/2014 10:05 AM by Tova Villareal NP     5/8/14 PCI LAD with stent placed             Chronic combined systolic and diastolic heart failure (HCC) (Chronic) ICD-10-CM: I50.42  ICD-9-CM: 428.42  1/20/2013 - Present    Overview Addendum 4/28/2018  4:53 AM by Mary Kingsley Juan A Pisano MD     5/8/14 ECHO:  EF 10-15%  12/2017:  EF 25-30%             Essential hypertension, benign (Chronic) ICD-10-CM: I10  ICD-9-CM: 401.1  1/20/2013 - Present        MDS (myelodysplastic syndrome) (HCC) (Chronic) ICD-10-CM: D46.9  ICD-9-CM: 238.75  12/17/2011 - Present    Overview Addendum 8/29/2013 11:06 AM by Paulino Ojeda started in August, 2011 12/18/11 Procrit weekly and Iron stores. 5-12 12-13-12 good response to 3 weekly procrit did not need it last time    3-7-13 Pt doing well. Just wanted a \"check-up. \" Responding to Procrit every three weeks.   8-29-13 patient has missed some injections on recently restarted hemoglobin only issue , takes oral iron iron stores each time                Iron deficiency anemia due to chronic blood loss (Chronic) ICD-10-CM: D50.0  ICD-9-CM: 280.0  7/29/2009 - Present        RESOLVED: CHF (congestive heart failure) ([facility] ICD-10-CM: I50.9  ICD-9-CM: 428.0  2/17/2017 - 4/27/2018        RESOLVED: Routine general medical examination at a health care facility ICD-10-CM: Z00.00  ICD-9-CM: V70.0  12/16/2016 - 4/27/2018        RESOLVED: Chest pain, unspecified ICD-10-CM: R07.9  ICD-9-CM: 786.50  12/7/2016 - 12/16/2016        RESOLVED: Chronic left-sided low back pain without sciatica ICD-10-CM: M54.5, G89.29  ICD-9-CM: 724.2, 338.29  6/15/2016 - 4/27/2018        RESOLVED: Tachycardia ICD-10-CM: R00.0  ICD-9-CM: 785.0  Unknown - 12/16/2016    Overview Signed 2/23/2016 11:02 AM by Ken Marc H/B              RESOLVED: Chronic respiratory failure ([facility] (Chronic) ICD-10-CM: J96.10  ICD-9-CM: 518.83  4/2/2014 - 4/27/2018            Plan:   S/p fall with bilateral hand fxs, right knee sprain, FELIZ on CKD, anemia with underlying dx of MDS; physical debility       Continue daily physician medical management:  Pneumonia prophylaxis- Incentive spirometer every hour while awake      Digit fxs both hands; cont splint/rachel tape; only restriction is no lifting; has chronic RUE weakness from what I suspect is a RTC injury; xray neg; OT working on modalities  -5/10 dec edema, increase use of fingers      COPD/REJI; pt is on Tr[facility] at [facility]. She manages this at home but having difficulty do to fxs in her hands  -cont Proventil , Pulmicort; Spiriva;  try weaning daytime O2; currently 4-6L due to hypoxia. Doesn't use during day at home per pt; 5/3 NOW reports chronic 3L at home, thus at baseline  -5/3 down from 4 to 2L , sats 98%; 5/4 on 3L  sats 96% (obviously needs due to drop noted to 65% sats when O2 accidentally not hooked up last noc and pt symptomatic with chest pain which resolved immediately when placed back on O2  -5/5 comfortable. No PRUETT, on home 3L NC, lowest sat 91%; 5/10 86% after doing 3 stairs      Severe CHF/cardiomyopathy; compensated currently; cont Coreg, Lasix; has an ICD ; on entresto as well, 5/3  -5/3 no evidence of acute issues; 5/4 compensated well and considering dec Lasix  -5/5 recheck renal fx, may dec lasix  -5/8 dec Lasix due to FELIZ on CKD; compensated; holding entresto  -5/10 restarted Entresto and have decreased Lasix to home dose of 80mg once daily      DVT risk / DVT Prophylaxis- Will require daily physician exam to assess for signs and symptoms as patient is at increased risk for of thromboembolism. Mobilization as tolerated. Intermittent pneumatic compression devices when in bed Thigh-high or knee-high thromboembolic deterrent hose when out of bed. On therapeutic Coumadin       Pain Control: ongoing significant pain in back, shoulders, knees and hands which is stable and controlled by PRN meds. Will require regular pain assessment and comprenhensive pain management,   -has end stage OA of the knees and now with right knee sprain; ice/ace wrap, modalities      Wound Care: Monitor wound status daily per staff and physician. At risk for failure. Will require 24/7 rehab nursing.  None needed but at risk due to immobility   -5/3 hematoma dorsum right hand; warm compress      Hypertension - BP uncontrolled, fluctuating, managed medically. Has hypotension, asymptomatic, parameters with meds   -5/3 119-141 SBP, controlled; 5/4 /66 -; on Coreg, Lasix and Entresto; CHF well compensated; consider decreasing lasix which is dosed at 80mg bid  5/5 bp 141/60; 5/6 117/78 ; 5/9 elevated today 156/83 ; 5/10 improved      Scleral hemorrhage; clinically unchanged but less periocular swelling      MDS/chronic anemia; monitor , replace as needed, consider epogen  -5/3 hgb stable at 8.4; 5/4 consider dose of epogen but I believe there are restrictions to ordering and may need hematology, will increase iron due to deficiency; recheck 5/6; 8.1  -consult hematology Mond if labs concerning; looking back at past notes; was on weekly procrit if hgb < 10; will order  -transf 1 unit 5/7 ; hbg 9.2 from 8.3; cont epogen weekly. F/u with hematology      FELIZ on CKD; improving, monitor labs and adjust meds; daily wt due to CHF  -creat slowly improving 5/3; 1.53 from 1.71 yesterday, BUN remains unchanged, 50's; monitor labs 3x/wk  -5/7 creat up; push fluids  -5/8 start 1/4 NS at 100 x 2 bags, creat 1.99 with bun 70s; dec Lasix and hold entresto  -5/9 Creat 1.65, bun 65; cont IVFs x 2 bags and hold entresto and cont dec dose Lasix. -5/10 creat 1.46; stopped IVFs , bun 58      Chronic coumadin therapy due to mech AVR; goal INR 2-3; inr 2.9; monitor and have pharmacy assist in dosing;   5/4 INR therap at 2.7 on home alternating doses of coumadin (2.5mg M/F and 5mg S/Tue/W/TH/SAT)  -5/9 remains therapeutic on coumadin per Pharmacy dosing; appreciate      ? Hx of DM listed but on no medications ; do have on diet restrictions; carbohydrate restrictions      Urinary retention/ neurogenic bladder - schedule voids q6-8 hrs. Check post-void residual as needed;  In and Out catheterize if post-void residual is more than 400 cc.  -5/4 no issues      bowel program - constipation, added bowel program; 5/4 continent; no const/diarr      GERD - resume PPI. At times may need additional antacids, Maalox prn.        Time spent was 25 minutes with over 1/2 in direct patient care/examination, consultation and coordination of care.      Signed By: Lorie Geronimo MD     May 10, 2018

## 2018-05-10 NOTE — PROGRESS NOTES
Team conference; discharge remains scheduled for 5/16 to home with family and CLTC services. Discussed with pt and daughter; agreeable. Family training for Tuesday. Provided pt with application and RX for handicap parking placard.

## 2018-05-10 NOTE — PROGRESS NOTES
Pt in a good spirits this AM no complaints noted at this time . Given pain med earlier this AM . DR. Norm Mcneal  Aware of pts AM labs. Dr. Mariella Esrtada stated to hold [facility] this Am . Left arm in split cast 1 to 2 plus edema . Rt  2 fingers wrapped .   Pt is good spirits

## 2018-05-11 LAB
ANION GAP SERPL CALC-SCNC: 7 MMOL/L (ref 7–16)
BUN SERPL-MCNC: 52 MG/DL (ref 8–23)
CALCIUM SERPL-MCNC: 9.3 MG/DL (ref 8.3–10.4)
CHLORIDE SERPL-SCNC: 102 MMOL/L (ref 98–107)
CO2 SERPL-SCNC: 33 MMOL/L (ref 21–32)
CREAT SERPL-MCNC: 1.55 MG/DL (ref 0.6–1)
GLUCOSE SERPL-MCNC: 107 MG/DL (ref 65–100)
INR PPP: 2.5
POTASSIUM SERPL-SCNC: 4.7 MMOL/L (ref 3.5–5.1)
PROTHROMBIN TIME: 26.2 SEC (ref 11.5–14.5)
SODIUM SERPL-SCNC: 142 MMOL/L (ref 136–145)

## 2018-05-11 PROCEDURE — 97535 SELF CARE MNGMENT TRAINING: CPT

## 2018-05-11 PROCEDURE — 97116 GAIT TRAINING THERAPY: CPT

## 2018-05-11 PROCEDURE — 97530 THERAPEUTIC ACTIVITIES: CPT

## 2018-05-11 PROCEDURE — 74011000250 HC RX REV CODE- 250: Performed by: PHYSICAL MEDICINE & REHABILITATION

## 2018-05-11 PROCEDURE — 85610 PROTHROMBIN TIME: CPT | Performed by: PHYSICAL MEDICINE & REHABILITATION

## 2018-05-11 PROCEDURE — 77010033678 HC OXYGEN DAILY

## 2018-05-11 PROCEDURE — 65310000000 HC RM PRIVATE REHAB

## 2018-05-11 PROCEDURE — 99232 SBSQ HOSP IP/OBS MODERATE 35: CPT | Performed by: PHYSICAL MEDICINE & REHABILITATION

## 2018-05-11 PROCEDURE — 94640 AIRWAY INHALATION TREATMENT: CPT

## 2018-05-11 PROCEDURE — 94760 N-INVAS EAR/PLS OXIMETRY 1: CPT

## 2018-05-11 PROCEDURE — 74011250637 HC RX REV CODE- 250/637: Performed by: PHYSICAL MEDICINE & REHABILITATION

## 2018-05-11 PROCEDURE — 97110 THERAPEUTIC EXERCISES: CPT

## 2018-05-11 PROCEDURE — 80048 BASIC METABOLIC PNL TOTAL CA: CPT | Performed by: PHYSICAL MEDICINE & REHABILITATION

## 2018-05-11 PROCEDURE — 36415 COLL VENOUS BLD VENIPUNCTURE: CPT | Performed by: PHYSICAL MEDICINE & REHABILITATION

## 2018-05-11 RX ADMIN — BISACODYL 5 MG: 5 TABLET, COATED ORAL at 20:21

## 2018-05-11 RX ADMIN — BUDESONIDE 500 MCG: 0.5 INHALANT RESPIRATORY (INHALATION) at 18:03

## 2018-05-11 RX ADMIN — ALBUTEROL SULFATE 2.5 MG: 2.5 SOLUTION RESPIRATORY (INHALATION) at 12:18

## 2018-05-11 RX ADMIN — ALBUTEROL SULFATE 2.5 MG: 2.5 SOLUTION RESPIRATORY (INHALATION) at 05:40

## 2018-05-11 RX ADMIN — ESCITALOPRAM 10 MG: 10 TABLET, FILM COATED ORAL at 08:26

## 2018-05-11 RX ADMIN — SACUBITRIL AND VALSARTAN 1 TABLET: 24; 26 TABLET, FILM COATED ORAL at 17:31

## 2018-05-11 RX ADMIN — TIOTROPIUM BROMIDE 18 MCG: 18 CAPSULE ORAL; RESPIRATORY (INHALATION) at 08:28

## 2018-05-11 RX ADMIN — TRAMADOL HYDROCHLORIDE 50 MG: 50 TABLET, FILM COATED ORAL at 20:23

## 2018-05-11 RX ADMIN — OXYCODONE HYDROCHLORIDE 10 MG: 5 TABLET ORAL at 08:25

## 2018-05-11 RX ADMIN — CARVEDILOL 3.12 MG: 3.12 TABLET, FILM COATED ORAL at 08:26

## 2018-05-11 RX ADMIN — SENNOSIDES 8.6 MG: 8.6 TABLET, FILM COATED ORAL at 08:25

## 2018-05-11 RX ADMIN — FUROSEMIDE 80 MG: 40 TABLET ORAL at 08:26

## 2018-05-11 RX ADMIN — WARFARIN SODIUM 2.5 MG: 2.5 TABLET ORAL at 17:38

## 2018-05-11 RX ADMIN — CARVEDILOL 3.12 MG: 3.12 TABLET, FILM COATED ORAL at 17:32

## 2018-05-11 RX ADMIN — TRAMADOL HYDROCHLORIDE 50 MG: 50 TABLET, FILM COATED ORAL at 20:21

## 2018-05-11 RX ADMIN — OXYCODONE HYDROCHLORIDE 10 MG: 5 TABLET ORAL at 17:37

## 2018-05-11 RX ADMIN — FERROUS SULFATE TAB 325 MG (65 MG ELEMENTAL FE) 325 MG: 325 (65 FE) TAB at 08:26

## 2018-05-11 RX ADMIN — FERROUS SULFATE TAB 325 MG (65 MG ELEMENTAL FE) 325 MG: 325 (65 FE) TAB at 17:32

## 2018-05-11 RX ADMIN — SIMVASTATIN 20 MG: 20 TABLET, FILM COATED ORAL at 21:30

## 2018-05-11 RX ADMIN — SACUBITRIL AND VALSARTAN 1 TABLET: 24; 26 TABLET, FILM COATED ORAL at 08:25

## 2018-05-11 RX ADMIN — POLYETHYLENE GLYCOL (3350) 17 G: 17 POWDER, FOR SOLUTION ORAL at 08:26

## 2018-05-11 RX ADMIN — ALBUTEROL SULFATE 2.5 MG: 2.5 SOLUTION RESPIRATORY (INHALATION) at 18:03

## 2018-05-11 RX ADMIN — BUDESONIDE 500 MCG: 0.5 INHALANT RESPIRATORY (INHALATION) at 05:40

## 2018-05-11 NOTE — PROGRESS NOTES
Subjective \"I am glad I get to see you right now. \"   Activity UE exercises with the use of her LUE   Strength/Endurance Patient was limited in the use of her UE due to her pain with movement. Balance CGA with SPT's   Social Interaction Patient was friendly and in good spirits during the session. Cognitive A&O X4   Comments Patient was left seated in her  chair and with all needs within reach.      Marvin Wright, CTRS

## 2018-05-11 NOTE — PROGRESS NOTES
PHYSICAL THERAPY DAILY NOTE  Time In: 0826  Time Out: 9480  Patient Seen For: AM;Balance activities;Gait training;Patient education;Transfer training; Other (see progress notes)    Subjective: patient reporting she feels better this AM. Reports she will go home in a 4 door car. Reports she enjoys talking to people and has a hard time not walking and talking         Objective:Vital Signs:  Patient Vitals for the past 12 hrs:   SpO2   05/11/18 0540 96 %     Patient on 02 at 3 lpm. Resting 02 sat 95 to 96%. 02 sat 84% after ambulating 60 ft on first attempt, second attempt 02 sat 88%    Pain level: 4 to 5 out of 10  Pain location:right knee and and lateral aspect of both hands  Pain interventions:Pain medication per RN, rest, positioning    Patient education:Breathing pattern training,transfer training, gait training, fall precautions, activity pacing, body mechanics, energy conservation, Patient verbalizing understanding and demonstrating understanding of patient education. Recommend follow up education. Interdisciplinary Communication:NA    Other (comment) (fall precautions, NWB Left hand)  GROSS ASSESSMENT Daily Assessment     NA       BED/MAT MOBILITY Daily Assessment    Rolling Right : 0 (Not tested)  Rolling Left : 0 (Not tested)  Supine to Sit : 0 (Not tested)  Sit to Supine : 0 (Not tested)       TRANSFERS Daily Assessment   Increased time and effort to complete with cues for body mechanics     Transfer Type: SPT without device  Transfer Assistance : 5 (Supervision/setup)  Sit to Stand Assistance: Stand-by assistance  Car Transfers: Minimum assistance (with LEs)  Car Type: rehab car       GAIT Daily Assessment    Amount of Assistance: 6 (Modified independent)  Distance (ft): 60 Feet (ft)  Assistive Device:  (No device)   Gait training with one time cue for managing 02 line.  Cues for breathing pattern     STEPS or STAIRS Daily Assessment    Steps/Stairs Ambulated (#): 4  Level of Assist : 4 (Minimal assistance)  Rail Use: None       BALANCE Daily Assessment    Sitting - Static: Good (unsupported)  Sitting - Dynamic: Good (unsupported)  Standing - Static: Good  Standing - Dynamic : Impaired       WHEELCHAIR MOBILITY Daily Assessment    Curbs/Ramps Assist Required (FIM Score): 0 (Not tested)  Wheelchair Setup Assist Required : 0 (Not tested)       LOWER EXTREMITY EXERCISES Daily Assessment     NA          Assessment: Progressing towards goals. Improved ability to transfer sit to  AM when she is not as tired. Overall functional endurance cont to improve. Difficulty keeping 02 sat greater than 88% when attempt to walk and talk at same time. Patient with improved ability to keep 02 sat > 88% when focusing on breathing pattern        Patient returned to room at end of treatment and remained up in recliner with LEs elevated and with needs in reach. 02 at 3 lpm    Plan of Care: Continue with POC and progress as tolerated.      Geneva Madrid, PT  5/11/2018

## 2018-05-11 NOTE — PROGRESS NOTES
Edson Gonzalez MD,   Medical Director  [address] [facility], [address] [facility]  Tel: [phone]       [facility] PROGRESS NOTE    Kamila Harmon  Admit Date: 5/2/2018  Admit Diagnosis: Debillity ; Physical debility    Subjective     Feeling sore today. Thinks its bc of all her working out. Hands achy today. In good spirits.  No cp, sob,n     Objective:     Current Facility-Administered Medications   Medication Dose Route Frequency    furosemide (LASIX) tablet 80 mg  80 mg Oral DAILY    0.45% sodium chloride infusion  100 mL/hr IntraVENous CONTINUOUS    0.9% sodium chloride infusion 250 mL  250 mL IntraVENous PRN    diphenhydrAMINE (BENADRYL) capsule 25 mg  25 mg Oral Q6H PRN    epoetin jairo (EPOGEN;PROCRIT) injection 4,000 Units  4,000 Units SubCUTAneous Q7D    ferrous sulfate tablet 325 mg  1 Tab Oral BID WITH MEALS    acetaminophen (TYLENOL) tablet 650 mg  650 mg Oral Q4H PRN    bisacodyl (DULCOLAX) tablet 5 mg  5 mg Oral DAILY PRN    LORazepam (ATIVAN) tablet 1 mg  1 mg Oral BID PRN    naloxone (NARCAN) injection 0.4 mg  0.4 mg IntraVENous PRN    prochlorperazine (COMPAZINE) injection 5 mg  5 mg IntraVENous Q8H PRN    sodium chloride (NS) flush 5-10 mL  5-10 mL IntraVENous PRN    albuterol (PROVENTIL VENTOLIN) nebulizer solution 2.5 mg  2.5 mg Nebulization Q4H PRN    bisacodyl (DULCOLAX) suppository 10 mg  10 mg Rectal DAILY PRN    budesonide (PULMICORT) 500 mcg/2 ml nebulizer suspension  500 mcg Nebulization BID RT    And    albuterol (PROVENTIL VENTOLIN) nebulizer solution 2.5 mg  2.5 mg Nebulization Q6HWA RT    carvedilol (COREG) tablet 3.125 mg  3.125 mg Oral BID WITH MEALS    escitalopram oxalate (LEXAPRO) tablet 10 mg  10 mg Oral DAILY    loratadine (CLARITIN) tablet 10 mg  10 mg Oral DAILY PRN    oxyCODONE IR (ROXICODONE) tablet 10 mg  10 mg Oral Q4H PRN    polyethylene glycol (MIRALAX) packet 17 g  17 g Oral DAILY    sacubitril-valsartan (ENTRESTO) 24-26 mg tablet 1 Tab  1 Tab Oral BID    senna (SENOKOT) tablet 8.6 mg  1 Tab Oral DAILY    simvastatin (ZOCOR) tablet 20 mg  20 mg Oral QHS    tiotropium (SPIRIVA) inhalation capsule 18 mcg  1 Cap Inhalation DAILY    traMADol (ULTRAM) tablet 50 mg  50 mg Oral Q6H PRN    warfarin (COUMADIN) tablet 2.5 mg  2.5 mg Oral Once per day on Mon Fri    warfarin (COUMADIN) tablet 5 mg  5 mg Oral Once per day on Sun Tue Wed Thu Sat     Review of Systems:Denies chest pain, shortness of breath, cough, headache, visual problems, abdominal pain, dysurea, calf pain. Pertinent positives are as noted in the medical records and unremarkable otherwise.   + PRUTET; no more than usual    Visit Vitals    /53 (BP 1 Location: Left leg, BP Patient Position: At rest)    Pulse 67    Temp 98 °F (36.7 °C)    Resp 16    Ht 5' 2\" (1.575 m)    Wt 223 lb (101.2 kg)    SpO2 96%    Breastfeeding No    BMI 40.79 kg/m2        Physical Exam:   General: Alert and age appropriately oriented. No acute cardio respiratory distress. HEENT: Normocephalic,no scleral icterus; conjunctival hemorrhage  Oral mucosa moist without cyanosis   Lungs: Clear to auscultation  bilaterally. Respiration even and unlabored   Heart: Regular rate and rhythm, S1, S2   No  murmurs, clicks, rub or gallops   Abdomen: Soft, non-tender, nondistended. Bowel sounds present. No organomegaly. obese   Genitourinary: defer . Neuromuscular:      Grossly new focal motor deficits noted. Moves ankles. Ankle dorsiflexion 5/5  Ankle plantarflexion 5/5  Exam limited by pain due to chronic right shoulder pain/weakness   Skin/extremity: No rashes, no erythema.  No calf tenderness BLE  Dorsum of bilateral hands less swollen, moving fingers well                                                                            Functional Assessment:          Balance  Sitting - Static: Good (unsupported) (05/10/18 1500)  Sitting - Dynamic: Good (unsupported) (05/10/18 1500)  Standing - Static: Good (05/10/18 1500)  Standing - Dynamic : Impaired (05/10/18 1500)       Feeding  Adaptive Equipment: Non-skid surface;Built up spoon;Plate guard (93/31/17 1014)             Bart Carrillo Fall Risk Assessment:  Bart Carrillo Fall Risk  Mobility: Ambulates or transfers with assist devices or assistance (05/10/18 2343)  Mobility Interventions: Patient to call before getting OOB (05/10/18 0943)  Mentation: Alert, oriented x 3 (05/10/18 2343)  Mentation Interventions: Door open when patient unattended (05/10/18 0943)  Medication: Patient receiving anticonvulsants, sedatives(tranquilizers), psychotropics or hypnotics, hypoglycemics, narcotics, sleep aids, antihypertensives, laxatives, or diuretics (05/10/18 2343)  Medication Interventions: Evaluate medications/consider consulting pharmacy (05/10/18 4179)  Elimination: Needs assistance with toileting (05/10/18 2343)  Elimination Interventions: Call light in reach (05/10/18 0943)  Prior Fall History: Before admission in past 12 months _home or previous inpatient care) (05/10/18 2343)  History of Falls Interventions: Consult care management for discharge planning;Door open when patient unattended (05/09/18 0715)  Total Score: 4 (05/10/18 2343)  Standard Fall Precautions: Yes (05/03/18 2239)  High Fall Risk: Yes (05/10/18 2343)     Speech Assessment:         Ambulation:  Gait  Distance (ft): 50 Feet (ft) (05/10/18 1500)  Assistive Device:  (No device) (05/10/18 1500)  Rail Use:  (None, patient forearm supported by PT) (05/10/18 0900)     Labs/Studies:  Recent Results (from the past 72 hour(s))   PROTHROMBIN TIME + INR    Collection Time: 05/09/18  5:35 AM   Result Value Ref Range    Prothrombin time 26.7 (H) 11.5 - 14.5 sec    INR 2.5     METABOLIC PANEL, BASIC    Collection Time: 05/09/18  5:35 AM   Result Value Ref Range    Sodium 140 136 - 145 mmol/L    Potassium 4.5 3.5 - 5.1 mmol/L    Chloride 100 98 - 107 mmol/L    CO2 32 21 - 32 mmol/L    Anion gap 8 7 - 16 mmol/L    Glucose 132 (H) 65 - 100 mg/dL    BUN 65 (H) 8 - 23 MG/DL    Creatinine 1.65 (H) 0.6 - 1.0 MG/DL    GFR est AA 40 (L) >60 ml/min/1.73m2    GFR est non-AA 33 (L) >60 ml/min/1.73m2    Calcium 9.1 8.3 - 10.4 MG/DL   MAGNESIUM    Collection Time: 05/09/18  5:35 AM   Result Value Ref Range    Magnesium 2.5 (H) 1.8 - 2.4 mg/dL   PHOSPHORUS    Collection Time: 05/09/18  5:35 AM   Result Value Ref Range    Phosphorus 4.3 (H) 2.3 - 3.7 MG/DL   METABOLIC PANEL, BASIC    Collection Time: 05/10/18  6:10 AM   Result Value Ref Range    Sodium 141 136 - 145 mmol/L    Potassium 4.3 3.5 - 5.1 mmol/L    Chloride 101 98 - 107 mmol/L    CO2 33 (H) 21 - 32 mmol/L    Anion gap 7 7 - 16 mmol/L    Glucose 120 (H) 65 - 100 mg/dL    BUN 58 (H) 8 - 23 MG/DL    Creatinine 1.46 (H) 0.6 - 1.0 MG/DL    GFR est AA 46 (L) >60 ml/min/1.73m2    GFR est non-AA 38 (L) >60 ml/min/1.73m2    Calcium 9.6 8.3 - 10.4 MG/DL   HGB & HCT    Collection Time: 05/10/18  6:10 AM   Result Value Ref Range    HGB 9.5 (L) 11.7 - 15.4 g/dL    HCT 30.1 (L) 35.8 - 46.3 %   PROTHROMBIN TIME + INR    Collection Time: 05/11/18  5:42 AM   Result Value Ref Range    Prothrombin time 26.2 (H) 11.5 - 14.5 sec    INR 2.5         Assessment:     Problem List as of 5/11/2018  Date Reviewed: 4/27/2018          Codes Class Noted - Resolved    * (Principal)Physical debility ICD-10-CM: R53.81  ICD-9-CM: 799.3  5/2/2018 - Present        Closed nondisplaced fracture of shaft of fifth metacarpal bone of left hand ICD-10-CM: S62.357A  ICD-9-CM: 815.03  4/28/2018 - Present        Subdural hematoma (HCC) ICD-10-CM: I62.00  ICD-9-CM: 432.1  4/27/2018 - Present        Long term (current) use of anticoagulants (Chronic) ICD-10-CM: Z79.01  ICD-9-CM: V58.61  4/19/2018 - Present        Dysthymia ICD-10-CM: F34.1  ICD-9-CM: 300.4  4/5/2018 - Present        Type 2 diabetes mellitus with nephropathy (HCC) (Chronic) ICD-10-CM: E11.21  ICD-9-CM: 250.40, 583.81  12/18/2017 - Present Pulmonary hypertension (HCC) (Chronic) ICD-10-CM: I27.20  ICD-9-CM: 416.8  6/15/2016 - Present        S/P AVR (aortic valve replacement) (Chronic) ICD-10-CM: Z95.2  ICD-9-CM: V43.3  Unknown - Present    Overview Signed 2/23/2016 11:04 AM by Rubia Valencia     Mechanical/Artific             Cardiomyopathy [facility]) (Chronic) ICD-10-CM: I42.9  ICD-9-CM: 425.4  Unknown - Present        Osteopenia ICD-10-CM: M85.80  ICD-9-CM: 733.90  Unknown - Present        HLD (hyperlipidemia) (Chronic) ICD-10-CM: E78.5  ICD-9-CM: 272.4  Unknown - Present        Osteoarthritis ICD-10-CM: M19.90  ICD-9-CM: 715.90  Unknown - Present        ICD (implantable cardioverter-defibrillator) in place ICD-10-CM: Z95.810  ICD-9-CM: V45.02  10/2/2014 - Present    Overview Signed 10/2/2014  4:58 PM by Buddy Gómez III     Biotronik single-chamber ICD implantation 10/20/14             Diabetes mellitus type 2, diet-controlled ([facility] [address] (Chronic) ICD-10-CM: E11.9  ICD-9-CM: 250.00  8/28/2014 - Present        COPD (chronic obstructive pulmonary disease) ([facility] [address] (Chronic) ICD-10-CM: J44.9  ICD-9-CM: 183  4/2/2014 - Present        REJI (obstructive sleep apnea)-cpap (Chronic) ICD-10-CM: G47.33  ICD-9-CM: 327.23  4/2/2014 - Present        CKD (chronic kidney disease) stage 3, GFR 30-59 ml/min (Chronic) ICD-10-CM: N18.3  ICD-9-CM: 585.3  7/10/2013 - Present        Anticoagulated on Coumadin (Chronic) ICD-10-CM: Z51.81, Z79.01  ICD-9-CM: V58.83, V58.61  7/9/2013 - Present    Overview Signed 7/9/2013  4:16 PM by Niurka Sheridan NP     S/P AVR             CAD (coronary artery disease) (Chronic) ICD-10-CM: I25.10  ICD-9-CM: 414.00  1/20/2013 - Present    Overview Signed 5/20/2014 10:05 AM by Jacqui Nixon NP     5/8/14 PCI LAD with stent placed             Chronic combined systolic and diastolic heart failure ([facility] [address] (Chronic) ICD-10-CM: I50.42  ICD-9-CM: 428.42  1/20/2013 - Present    Overview Addendum 4/28/2018  4:53 AM by Pricilla Jessica MD     5/8/14 ECHO:  EF 10-15%  12/2017:  EF 25-30%             Essential hypertension, benign (Chronic) ICD-10-CM: I10  ICD-9-CM: 401.1  1/20/2013 - Present        MDS (myelodysplastic syndrome) (HCC) (Chronic) ICD-10-CM: D46.9  ICD-9-CM: 238.75  12/17/2011 - Present    Overview Addendum 8/29/2013 11:06 AM by Vince Espinosa started in August, 2011 12/18/11 Procrit weekly and Iron stores. 5-12 12-13-12 good response to 3 weekly procrit did not need it last time    3-7-13 Pt doing well. Just wanted a \"check-up. \" Responding to Procrit every three weeks.   8-29-13 patient has missed some injections on recently restarted hemoglobin only issue , takes oral iron iron stores each time                Iron deficiency anemia due to chronic blood loss (Chronic) ICD-10-CM: D50.0  ICD-9-CM: 280.0  7/29/2009 - Present        RESOLVED: CHF (congestive heart failure) (HCC) ICD-10-CM: I50.9  ICD-9-CM: 428.0  2/17/2017 - 4/27/2018        RESOLVED: Routine general medical examination at a health care facility ICD-10-CM: Z00.00  ICD-9-CM: V70.0  12/16/2016 - 4/27/2018        RESOLVED: Chest pain, unspecified ICD-10-CM: R07.9  ICD-9-CM: 786.50  12/7/2016 - 12/16/2016        RESOLVED: Chronic left-sided low back pain without sciatica ICD-10-CM: M54.5, G89.29  ICD-9-CM: 724.2, 338.29  6/15/2016 - 4/27/2018        RESOLVED: Tachycardia ICD-10-CM: R00.0  ICD-9-CM: 785.0  Unknown - 12/16/2016    Overview Signed 2/23/2016 11:02 AM by Micky Marc H/B              RESOLVED: Chronic respiratory failure ([address] (Chronic) ICD-10-CM: J96.10  ICD-9-CM: 518.83  4/2/2014 - 4/27/2018              Plan:     .              Plan:   S/p fall with bilateral hand fxs, right knee sprain, FELIZ on CKD, anemia with underlying dx of MDS; physical debility       Continue daily physician medical management:  Pneumonia prophylaxis- Incentive spirometer every hour while awake      Digit fxs both hands; cont splint/rachel tape; only restriction is no lifting; has chronic RUE weakness from what I suspect is a RTC injury; xray neg; OT working on modalities  -5/10 dec edema, increase use of fingers  5/11 dc spint; cont rachel taping 3rd and 4th digits right hand and 4th /5th digits on left hand. No lifting but can use hands for ADLs as tolerated      COPD/REJI; pt is on [facility] at [facility]. She manages this at home but having difficulty do to fxs in her hands  -cont Proventil , Pulmicort; Spiriva;  try weaning daytime O2; currently 4-6L due to hypoxia. Doesn't use during day at home per pt; 5/3 NOW reports chronic 3L at home, thus at baseline  -5/3 down from 4 to 2L , sats 98%; 5/4 on 3L  sats 96% (obviously needs due to drop noted to 65% sats when O2 accidentally not hooked up last noc and pt symptomatic with chest pain which resolved immediately when placed back on O2  -5/5 comfortable. No PRUETT, on home 3L NC, lowest sat 91%; 5/10 86% after doing 3 stairs      Severe CHF/cardiomyopathy; compensated currently; cont Coreg, Lasix; has an ICD ; on entresto as well, 5/3  -5/3 no evidence of acute issues; 5/4 compensated well and considering dec Lasix  -5/5 recheck renal fx, may dec lasix  -5/8 dec Lasix due to FELIZ on CKD; compensated; holding entresto  -5/10 restarted Entresto and have decreased Lasix to home dose of 80mg once daily      DVT risk / DVT Prophylaxis- Will require daily physician exam to assess for signs and symptoms as patient is at increased risk for of thromboembolism. Mobilization as tolerated. Intermittent pneumatic compression devices when in bed Thigh-high or knee-high thromboembolic deterrent hose when out of bed. On therapeutic Coumadin       Pain Control: ongoing significant pain in back, shoulders, knees and hands which is stable and controlled by PRN meds.  Will require regular pain assessment and comprenhensive pain management,   -has end stage OA of the knees and now with right knee sprain; ice/ace wrap, modalities      Wound Care: Monitor wound status daily per staff and physician. At risk for failure. Will require 24/7 rehab nursing. None needed but at risk due to immobility   -5/3 hematoma dorsum right hand; warm compress      Hypertension - BP uncontrolled, fluctuating, managed medically. Has hypotension, asymptomatic, parameters with meds   -5/3 119-141 SBP, controlled; 5/4 /66 -; on Coreg, Lasix and Entresto; CHF well compensated; consider decreasing lasix which is dosed at 80mg bid  5/5 bp 141/60; 5/6 117/78 ; 5/9 elevated today 156/83 ; 5/10 improved      Scleral hemorrhage; clinically unchanged but less periocular swelling      MDS/chronic anemia; monitor , replace as needed, consider epogen  -5/3 hgb stable at 8.4; 5/4 consider dose of epogen but I believe there are restrictions to ordering and may need hematology, will increase iron due to deficiency; recheck 5/6; 8.1  -consult hematology Mond if labs concerning; looking back at past notes; was on weekly procrit if hgb < 10; will order  -transf 1 unit 5/7 ; hbg 9.2 from 8.3; cont epogen weekly. F/u with hematology      FELIZ on CKD; improving, monitor labs and adjust meds; daily wt due to CHF  -creat slowly improving 5/3; 1.53 from 1.71 yesterday, BUN remains unchanged, 50's; monitor labs 3x/wk  -5/7 creat up; push fluids  -5/8 start 1/4 NS at 100 x 2 bags, creat 1.99 with bun 70s; dec Lasix and hold entresto  -5/9 Creat 1.65, bun 65; cont IVFs x 2 bags and hold entresto and cont dec dose Lasix.    -5/10 creat 1.46; stopped IVFs , bun 58; recheck 5/14      Chronic coumadin therapy due to mech AVR; goal INR 2-3; inr 2.9; monitor and have pharmacy assist in dosing;   5/4 INR therap at 2.7 on home alternating doses of coumadin (2.5mg M/F and 5mg S/Tue/W/TH/SAT)  -5/9 remains therapeutic on coumadin per Pharmacy dosing; appreciate  -5/11 INR therapeutic at 2.5 on home dosing as noted above      ? Hx of DM listed but on no medications ; do have on diet restrictions; carbohydrate restrictions      Urinary retention/ neurogenic bladder - schedule voids q6-8 hrs. Check post-void residual as needed; In and Out catheterize if post-void residual is more than 400 cc.  -5/4 no issues      bowel program - constipation, added bowel program; 5/4 continent; no const/diarr      GERD - resume PPI. At times may need additional antacids, Maalox prn. Time spent was 25 minutes with over 1/2 in direct patient care/examination, consultation and coordination of care.      Signed By: Radha Steven MD     May 11, 2018

## 2018-05-11 NOTE — PROGRESS NOTES
05/11/18 1045   Time Spent With Patient   Time In 0650   Time Out 0735   Patient Seen For: AM;ADLs   Feeding/Eating   Feeding/Eating Assistance S   Adaptive Equipment Built up spoon;Non-skid surface;Plate guard   Grooming   Grooming Assistance  SBA   Upper Body Bathing   Bathing Assistance, Upper Mod A   Lower Body Bathing   Bathing Assistance, Lower  Mod A   Comments able to stand to wash madi area. Toileting   Toileting Assistance (FIM Score) Max A   Upper Body Dressing    Dressing Assistance  Max A   Lower Body Dressing    Dressing Assistance  Max A   Functional Transfers   Tub or Shower Type Shower   Amount of Assistance Required Min A   Adaptive Equipment Grab bars     S: \"I am always feeling better towards the end of these morning sessions. \" Agreeable to therapy. Focus of session was on morning ADL routine. Patient was able to ambulate ~10 feet using a HHA with CGA. Pain not indicated during session. Collaborated with PT, Yoselin Frey and confirmed patient is on track to reach goals as documented in the care plan. Patient tolerated session well, but activity tolerance, ROM, pain, dexterity are still below baseline and requires skilled facilitation to successfully and safely complete ADL's and transfers. Patient ended session in recliner, setup for breakfast, with call button and phone within reach.      Winston Novak, SAMIRR

## 2018-05-11 NOTE — PROGRESS NOTES
Warfarin dosing per pharmacist    Donald Dangelo Lavell Lopez is a 77 y.o. female. Height: 5' 2\" (157.5 cm)    Weight: 101.2 kg (223 lb)    Indication:  Mechanical aortic valve replacement    Goal INR:  2-3    Home dose:  2.5 mg Mon, Fri; 5 mg all other days    Risk factors or significant drug interactions:  none    Other anticoagulants:  none    Daily Monitoring  Date  INR     Warfarin dose HGB              Notes  4/28  2.1          5 mg + 2.5 mg 7.4  4/29  2.4  5 mg  7  4/30  2.6  2.5 mg  8  5/1  2.9  5 mg  7.9  5/2  2.9  5 mg  8.5  Pharmacy consulted  5/3  2.7  5 mg  8.4  5/4  2.7  2.5 mg  ---   5/5  3  5 mg  ---   5/6  2.8  5 mg  8.1  5/7  2.8  2.5 mg  8.3  5/8  2.7  5 mg  9.2  5/9  2.5  5 mg  --   5/10  ---  5 mg  9.5  5/11  2.5  2.5 mg  --    Pharmacy consulted to dose warfarin for Ms. Corral. She has been transferred to inpatient rehab following hospital stay for complications following a fall. She is followed by [facility] at the anti-coag clinic and was therapeutic on her home dose at her last visit on 4/19/2018. INR 2.5. No changes. Following INR on MWF currently. Pharmacy will continue to follow. Please call with any questions. Thank you,  Benetta Cabot, Pharm. D.   Clinical Pharmacist  [phone]

## 2018-05-11 NOTE — PROGRESS NOTES
Assisted pt to bathroom. Pt able to walk w/o assist of a walker and 1 person stand by assist. Staff had to perform madi care and pull pants up and down. Pt walked from bathroom tp recliner. Foot rest elevated. Pt on 2 L N/C. Call light with in reach.

## 2018-05-11 NOTE — PROGRESS NOTES
OT Daily Note    Time In 1115   Time Out 1200     Subjective:\"I had a dream that it was lunch time\" Agreeable to therapy. Pain:slowly getting better in RUE, but no GH ROM completed. Interdisciplinary Communication: Collaborated with Destiney Baig and patient is making progress towards goals. Precautions: Other (comment) (fall precautions; NWB LUE)    Balance/functional mobility Daily Assessment   Ambulated 20' x 2 with SBA no device. R UE Daily Assessment   Added biofreeze start of session for pain relief on [address] joint and scapula. Also only complete light tissue work on scapula and [address] for pain relief with good results this session. Addition of some scapular depression all passively and slowly. This session completed bilateral AROM wrist extension and flexion within limits of pain. [facility] Daily Assessment   Completed smaller wooden pegs closer to the size of large markers to complete with LUE for [facility], able to use thumb and first 2 digit to place 36 pegs (12 more than yesterday), also incorporated right hand to help manipulate pegs in left hand. With removal of left ulnar orthosis since this morning per Dr. Tray Cooper, patient able to complete [facility] with significantly better efficiency. Feeding Daily Assessment   Set up with same DME as earlier notes for feeding, improving quality each day, found a soup spoon that appears to work better. Ended session:In recliner, setup and eating lunch.      Ankur Cui OTR/L

## 2018-05-11 NOTE — PROGRESS NOTES
Problem: Nutrition Deficit  Goal: *Optimize nutritional status  Nutrition:  Assessment based on early LOS. Assessment:  Anthropometrics:   Ht - 5'2\", wgt - 101.2 kg (9th floor unknown source 5/2/18), BMI 40.8 c/w obesity class III, edema - 1+ BLEs. Macronutrient Needs:  Estimated calorie needs - 4125-6723 kael/day (11-14 kael/kg/day)   Estimated protein needs - 40-60 gm pro/day (0.8-1.2 gm pro/kgIBW/day) (GFR 43 ml/min)  Intake/Comparative Standards: This patient's average intake of [employer] diet for the past 8 recorded days/18 meals: 86%. This potentially meets >100% of calorie and >100% of protein goals. Diet:   CCHO. Pertinent Labs:   BUN 52, creatinine 1.55. Pertinent Medications:   Roxicodone, Miralax, Senokot. Food/Nutrition History:   The patient presents with no acute nutrition risk factors based on the nursing admission malnutrition screen. She reports no food allergies. Good oral intake despite sight limitations. Diagnosis (Nutrition):  No nutrition diagnosis at this time. Intervention:  Meals and Snacks: CCHO. Nutrition Discharge Plan: Too soon to determine. Kathy Modi.  Marco Bird  [phone]

## 2018-05-11 NOTE — PROGRESS NOTES
Pt c/o generalize pain, pt received Oxycodone 10 mg po. Pt sitting in recliner with pillows for comfort. Splint and tapping to fingers done Dr. Juliette Clements nurse.  See orders

## 2018-05-11 NOTE — PROGRESS NOTES
PHYSICAL THERAPY DAILY NOTE  Time In: 8889  Time Out: 1345  Patient Seen For: PM;Gait training;Patient education; Therapeutic exercise;Transfer training; Other (see progress notes)    Subjective: patient reporting she has felt stronger today. Reports she does get more short of breath when walking longer distances and has difficulty not talking when walking         Objective:Vital Signs:patient on 02 at 3 lpm. Resting 02 sat 91 to 90%. 02 sat 84% after ambulating 110ft. 02 sat improved to 88% after 2 minutes of rest, cues for breathing pattern  Patient Vitals for the past 12 hrs:   SpO2   05/11/18 1220 96 %   05/11/18 0540 96 %     Pain level:4 out of 10  Pain location:both hands and right knee  Pain interventions:Pain medication per RN, rest, positioning,ROm for right knee    Patient education:Breathing techniques during mobility,transfer training, gait training, fall precautions, activity pacing, body mechanics, energy conservation, Patient verbalizing understanding and demonstrating understanding of patient education. Recommend follow up education.     Interdisciplinary Communication:NA    Other (comment) (fall precautions, NWB Left hand)  GROSS ASSESSMENT Daily Assessment     NA       BED/MAT MOBILITY Daily Assessment    Rolling Right : 0 (Not tested)  Rolling Left : 0 (Not tested)  Supine to Sit : 0 (Not tested)  Sit to Supine : 0 (Not tested)       TRANSFERS Daily Assessment   Increased time and effort to complete with cues for body mechanics   Transfer Type: SPT without device  Transfer Assistance : 5 (Supervision/setup)  Sit to Stand Assistance: Stand-by assistance  Car Transfers: Not tested  Car Type: rehab car       GAIT Daily Assessment    Amount of Assistance: 6 (Modified independent)  Distance (ft): 110 Feet (ft)  Assistive Device:  (No device)   Gait training with cues for breathing pattern and controlling gait speed with breathing rhythm, cues to avoid talking when walking    STEPS or STAIRS Daily Assessment    Steps/Stairs Ambulated (#): 0  Level of Assist : 0 (Not tested)  Rail Use: None       BALANCE Daily Assessment    Sitting - Static: Good (unsupported)  Sitting - Dynamic: Good (unsupported)  Standing - Static: Good  Standing - Dynamic : Impaired       WHEELCHAIR MOBILITY Daily Assessment    Curbs/Ramps Assist Required (FIM Score): 0 (Not tested)  Wheelchair Setup Assist Required : 0 (Not tested)       LOWER EXTREMITY EXERCISES Daily Assessment    Extremity: Both  Exercise Type #1: Seated lower extremity strengthening  Sets Performed: 1  Reps Performed: 20  Level of Assist: Minimal assistance     SEATED EXERCISES Sets Reps Comments   Ankle Pumps 1 20    Hip Flexion 2 10    Long Arc Quads 2 10    Hip Adduction/Ball Squeeze 1 20    Hip Abduction with red Theraband 1 20    Hamstring Curls with red Theraband 2 10    Hip Extension with red Theraband 1 20             Assessment: Progressing towards goals. Ongoing difficulty keeping 02 sat greater than 88% when ambulating > 75ft. Patient returned to room at end of treatment and remained up in recliner with LEs elevated and with needs in reach. 02 at 3 lpm.    Plan of Care: Continue with POC and progress as tolerated.      Eleanor Lu, PT  5/11/2018

## 2018-05-11 NOTE — PROGRESS NOTES
End Of Shift Functional Summary, Nursing      TOILETING:    Does patient need assist with adjusting pants up or down and/or pericare? yes    Pt uses  wheelchair  Does the patient require extra time? yes  Does the patient require standby assistance? no  Does the patient require contact guard assistance? yes  Does the patient require more than one staff member for assistance? no    TOILET TRANSFER:    Pt requires minimal assistance. Pt uses wheelchair or walks with supervision  Does the patient require extra time? yes  Does the patient require contact guard assistance? no  Does the patient require more than one staff member for assistance? no    BLADDER:    Pt does not have a medina catheter that staff manages. Pt does not take medication. If so, please indicate which medication:    Pt is incontinent. of bladder and voids in toilet  Pt requires staff to change brief Pt has had 1 bladder accidents during this shift requiring maximum assistance to clean up. (An accident is when the episode is not contained in a brief AND/OR the clothing/linen requires changing/cleaning up.)    BOWEL:  Pt does take medication. Pt is continent of bowel and uses toilet. Pt requires staff to change brief    Pt has had 1 bowel accidents during this shift requiring moderate assistance from staff to clean up. (An accident is when the episode is not contained in a brief AND/OR the clothing/linen requires changing/cleaning up.)    BED/CHAIR TRANSFER  Pt requires minimal assistance. Pt uses wheelchair  Does the patient require extra time? yes  Does the patient require contact guard assistance? yes  Does the patient require more than one staff member for assistance? no      EATING  Pt requires adaptive device. Pt does not wear dentures. TUBE FEEDINGS:  Pt does not  receive nutrition through tube feedings. Patient requires supervision/setup with feedings.           Documentation reviewed and plan of care discussed/reviewed with   patient during the shift.

## 2018-05-12 PROCEDURE — 97110 THERAPEUTIC EXERCISES: CPT

## 2018-05-12 PROCEDURE — 94640 AIRWAY INHALATION TREATMENT: CPT

## 2018-05-12 PROCEDURE — 97535 SELF CARE MNGMENT TRAINING: CPT

## 2018-05-12 PROCEDURE — 77010033678 HC OXYGEN DAILY

## 2018-05-12 PROCEDURE — 65310000000 HC RM PRIVATE REHAB

## 2018-05-12 PROCEDURE — 74011000250 HC RX REV CODE- 250: Performed by: PHYSICAL MEDICINE & REHABILITATION

## 2018-05-12 PROCEDURE — 97116 GAIT TRAINING THERAPY: CPT

## 2018-05-12 PROCEDURE — 94760 N-INVAS EAR/PLS OXIMETRY 1: CPT

## 2018-05-12 PROCEDURE — 97530 THERAPEUTIC ACTIVITIES: CPT

## 2018-05-12 PROCEDURE — 74011250637 HC RX REV CODE- 250/637: Performed by: PHYSICAL MEDICINE & REHABILITATION

## 2018-05-12 RX ADMIN — ALBUTEROL SULFATE 2.5 MG: 2.5 SOLUTION RESPIRATORY (INHALATION) at 11:23

## 2018-05-12 RX ADMIN — ALBUTEROL SULFATE 2.5 MG: 2.5 SOLUTION RESPIRATORY (INHALATION) at 06:00

## 2018-05-12 RX ADMIN — CARVEDILOL 3.12 MG: 3.12 TABLET, FILM COATED ORAL at 08:37

## 2018-05-12 RX ADMIN — TIOTROPIUM BROMIDE 18 MCG: 18 CAPSULE ORAL; RESPIRATORY (INHALATION) at 06:00

## 2018-05-12 RX ADMIN — ESCITALOPRAM 10 MG: 10 TABLET, FILM COATED ORAL at 08:36

## 2018-05-12 RX ADMIN — WARFARIN SODIUM 5 MG: 2.5 TABLET ORAL at 18:01

## 2018-05-12 RX ADMIN — SENNOSIDES 8.6 MG: 8.6 TABLET, FILM COATED ORAL at 08:36

## 2018-05-12 RX ADMIN — FERROUS SULFATE TAB 325 MG (65 MG ELEMENTAL FE) 325 MG: 325 (65 FE) TAB at 16:51

## 2018-05-12 RX ADMIN — POLYETHYLENE GLYCOL (3350) 17 G: 17 POWDER, FOR SOLUTION ORAL at 08:37

## 2018-05-12 RX ADMIN — SACUBITRIL AND VALSARTAN 1 TABLET: 24; 26 TABLET, FILM COATED ORAL at 18:01

## 2018-05-12 RX ADMIN — CARVEDILOL 3.12 MG: 3.12 TABLET, FILM COATED ORAL at 16:51

## 2018-05-12 RX ADMIN — OXYCODONE HYDROCHLORIDE 10 MG: 5 TABLET ORAL at 08:15

## 2018-05-12 RX ADMIN — BUDESONIDE 500 MCG: 0.5 INHALANT RESPIRATORY (INHALATION) at 17:25

## 2018-05-12 RX ADMIN — ALBUTEROL SULFATE 2.5 MG: 2.5 SOLUTION RESPIRATORY (INHALATION) at 17:25

## 2018-05-12 RX ADMIN — FERROUS SULFATE TAB 325 MG (65 MG ELEMENTAL FE) 325 MG: 325 (65 FE) TAB at 08:36

## 2018-05-12 RX ADMIN — SACUBITRIL AND VALSARTAN 1 TABLET: 24; 26 TABLET, FILM COATED ORAL at 08:37

## 2018-05-12 RX ADMIN — OXYCODONE HYDROCHLORIDE 10 MG: 5 TABLET ORAL at 19:50

## 2018-05-12 RX ADMIN — OXYCODONE HYDROCHLORIDE 10 MG: 5 TABLET ORAL at 02:21

## 2018-05-12 RX ADMIN — BUDESONIDE 500 MCG: 0.5 INHALANT RESPIRATORY (INHALATION) at 06:00

## 2018-05-12 RX ADMIN — FUROSEMIDE 80 MG: 40 TABLET ORAL at 08:36

## 2018-05-12 RX ADMIN — SIMVASTATIN 20 MG: 20 TABLET, FILM COATED ORAL at 22:16

## 2018-05-12 NOTE — PROGRESS NOTES
Shakeel Iqbal MD,   Medical Director  [address] [facility], [address]  Tel: [phone]       [facility] PROGRESS NOTE    Asia Hanna  Admit Date: 5/2/2018  Admit Diagnosis: Debillity   Physical debility    Subjective     Feeling sore today. Thinks its bc of all her working out. Hands achy today. In good spirits. No cp, sob,n     Patient seen and examined. No pain at rest. Denies dizziness, palpations, SOB or nausea. Participating in therapy.     Objective:     Current Facility-Administered Medications   Medication Dose Route Frequency    furosemide (LASIX) tablet 80 mg  80 mg Oral DAILY    0.9% sodium chloride infusion 250 mL  250 mL IntraVENous PRN    diphenhydrAMINE (BENADRYL) capsule 25 mg  25 mg Oral Q6H PRN    epoetin jairo (EPOGEN;PROCRIT) injection 4,000 Units  4,000 Units SubCUTAneous Q7D    ferrous sulfate tablet 325 mg  1 Tab Oral BID WITH MEALS    acetaminophen (TYLENOL) tablet 650 mg  650 mg Oral Q4H PRN    bisacodyl (DULCOLAX) tablet 5 mg  5 mg Oral DAILY PRN    LORazepam (ATIVAN) tablet 1 mg  1 mg Oral BID PRN    naloxone (NARCAN) injection 0.4 mg  0.4 mg IntraVENous PRN    prochlorperazine (COMPAZINE) injection 5 mg  5 mg IntraVENous Q8H PRN    sodium chloride (NS) flush 5-10 mL  5-10 mL IntraVENous PRN    albuterol (PROVENTIL VENTOLIN) nebulizer solution 2.5 mg  2.5 mg Nebulization Q4H PRN    bisacodyl (DULCOLAX) suppository 10 mg  10 mg Rectal DAILY PRN    budesonide (PULMICORT) 500 mcg/2 ml nebulizer suspension  500 mcg Nebulization BID RT    And    albuterol (PROVENTIL VENTOLIN) nebulizer solution 2.5 mg  2.5 mg Nebulization Q6HWA RT    carvedilol (COREG) tablet 3.125 mg  3.125 mg Oral BID WITH MEALS    escitalopram oxalate (LEXAPRO) tablet 10 mg  10 mg Oral DAILY    loratadine (CLARITIN) tablet 10 mg  10 mg Oral DAILY PRN    oxyCODONE IR (ROXICODONE) tablet 10 mg  10 mg Oral Q4H PRN    polyethylene glycol (MIRALAX) packet 17 g  17 g Oral DAILY    sacubitril-valsartan (ENTRESTO) 24-26 mg tablet 1 Tab  1 Tab Oral BID    senna (SENOKOT) tablet 8.6 mg  1 Tab Oral DAILY    simvastatin (ZOCOR) tablet 20 mg  20 mg Oral QHS    tiotropium (SPIRIVA) inhalation capsule 18 mcg  1 Cap Inhalation DAILY    traMADol (ULTRAM) tablet 50 mg  50 mg Oral Q6H PRN    warfarin (COUMADIN) tablet 2.5 mg  2.5 mg Oral Once per day on Mon Fri    warfarin (COUMADIN) tablet 5 mg  5 mg Oral Once per day on Sun Tue Wed Thu Sat     Review of Systems:Denies chest pain, shortness of breath, cough, headache, visual problems, abdominal pain, dysurea, calf pain. Pertinent positives are as noted in the medical records and unremarkable otherwise.   + PRUETT; no more than usual    Visit Vitals    /67 (BP 1 Location: Right leg, BP Patient Position: At rest)    Pulse 63    Temp 98.1 °F (36.7 °C)    Resp 18    Ht 5' 2\" (1.575 m)    Wt 101.2 kg (223 lb)    SpO2 97%    Breastfeeding No    BMI 40.79 kg/m2        Physical Exam:   General: Alert and age appropriately oriented. Pleasant affect, up in chair in room. No acute cardio respiratory distress. HEENT: Normocephalic,no scleral icterus; conjunctival hemorrhage  Oral mucosa moist without cyanosis   Lungs: R side clear to auscultation, L atelectasis   Respiration even and unlabored   Heart: Regular rate and rhythm, S1, S2   No  murmurs, clicks, rub or gallops   Abdomen: Soft, non-tender, nondistended. Bowel sounds present. No organomegaly. obese   Genitourinary: defer . Neuromuscular:      Grossly new focal motor deficits noted. Moves ankles. Ankle dorsiflexion 5/5  Ankle plantarflexion 5/5  Exam limited by pain due to chronic right shoulder pain/weakness   Skin/extremity: No rashes, no erythema.  No calf tenderness BLE  Dorsum of bilateral hands less swollen, moving fingers well                                                                            Functional Assessment:          Balance  Sitting - Static: Good (unsupported) (05/11/18 1500)  Sitting - Dynamic: Good (unsupported) (05/11/18 1500)  Standing - Static: Good (05/11/18 1500)  Standing - Dynamic : Impaired (05/11/18 1500)       Feeding  Adaptive Equipment: Built up spoon;Non-skid surface;Plate guard (26/47/18 1045)             Gaviota Valencia Fall Risk Assessment:  Gaviota Valencia Fall Risk  Mobility: Ambulates or transfers with assist devices or assistance (05/12/18 0731)  Mobility Interventions: Utilize walker, cane, or other assitive device; Patient to call before getting OOB (05/12/18 0731)  Mentation: Alert, oriented x 3 (05/12/18 0731)  Mentation Interventions: Adequate sleep, hydration, pain control (05/12/18 0731)  Medication: Patient receiving anticonvulsants, sedatives(tranquilizers), psychotropics or hypnotics, hypoglycemics, narcotics, sleep aids, antihypertensives, laxatives, or diuretics (05/12/18 0731)  Medication Interventions: Patient to call before getting OOB; Teach patient to arise slowly (05/12/18 0731)  Elimination: Needs assistance with toileting (05/12/18 0731)  Elimination Interventions: Call light in reach; Patient to call for help with toileting needs; Toileting schedule/hourly rounds (05/12/18 0731)  Prior Fall History: Before admission in past 12 months _home or previous inpatient care) (05/12/18 0731)  History of Falls Interventions: Consult care management for discharge planning;Evaluate medications/consider consulting pharmacy (05/12/18 0731)  Total Score: 4 (05/12/18 0731)  Standard Fall Precautions: Yes (05/03/18 2239)  High Fall Risk: Yes (05/12/18 0731)     Speech Assessment:         Ambulation:  Gait  Distance (ft): 110 Feet (ft) (05/11/18 1500)  Assistive Device:  (No device) (05/11/18 1500)  Rail Use: None (05/11/18 1100)     Labs/Studies:  Recent Results (from the past 72 hour(s))   METABOLIC PANEL, BASIC    Collection Time: 05/10/18  6:10 AM   Result Value Ref Range    Sodium 141 136 - 145 mmol/L    Potassium 4.3 3.5 - 5.1 mmol/L Chloride 101 98 - 107 mmol/L    CO2 33 (H) 21 - 32 mmol/L    Anion gap 7 7 - 16 mmol/L    Glucose 120 (H) 65 - 100 mg/dL    BUN 58 (H) 8 - 23 MG/DL    Creatinine 1.46 (H) 0.6 - 1.0 MG/DL    GFR est AA 46 (L) >60 ml/min/1.73m2    GFR est non-AA 38 (L) >60 ml/min/1.73m2    Calcium 9.6 8.3 - 10.4 MG/DL   HGB & HCT    Collection Time: 05/10/18  6:10 AM   Result Value Ref Range    HGB 9.5 (L) 11.7 - 15.4 g/dL    HCT 30.1 (L) 35.8 - 46.3 %   PROTHROMBIN TIME + INR    Collection Time: 05/11/18  5:42 AM   Result Value Ref Range    Prothrombin time 26.2 (H) 11.5 - 14.5 sec    INR 2.5     METABOLIC PANEL, BASIC    Collection Time: 05/11/18  5:42 AM   Result Value Ref Range    Sodium 142 136 - 145 mmol/L    Potassium 4.7 3.5 - 5.1 mmol/L    Chloride 102 98 - 107 mmol/L    CO2 33 (H) 21 - 32 mmol/L    Anion gap 7 7 - 16 mmol/L    Glucose 107 (H) 65 - 100 mg/dL    BUN 52 (H) 8 - 23 MG/DL    Creatinine 1.55 (H) 0.6 - 1.0 MG/DL    GFR est AA 43 (L) >60 ml/min/1.73m2    GFR est non-AA 35 (L) >60 ml/min/1.73m2    Calcium 9.3 8.3 - 10.4 MG/DL       Assessment:     Problem List as of 5/12/2018  Date Reviewed: 4/27/2018          Codes Class Noted - Resolved    * (Principal)Physical debility ICD-10-CM: R53.81  ICD-9-CM: 799.3  5/2/2018 - Present        Closed nondisplaced fracture of shaft of fifth metacarpal bone of left hand ICD-10-CM: S62.357A  ICD-9-CM: 815.03  4/28/2018 - Present        Subdural hematoma (HCC) ICD-10-CM: I62.00  ICD-9-CM: 432.1  4/27/2018 - Present        Long term (current) use of anticoagulants (Chronic) ICD-10-CM: Z79.01  ICD-9-CM: V58.61  4/19/2018 - Present        Dysthymia ICD-10-CM: F34.1  ICD-9-CM: 300.4  4/5/2018 - Present        Type 2 diabetes mellitus with nephropathy (HCC) (Chronic) ICD-10-CM: E11.21  ICD-9-CM: 250.40, 583.81  12/18/2017 - Present        Pulmonary hypertension (HCC) (Chronic) ICD-10-CM: I27.20  ICD-9-CM: 416.8  6/15/2016 - Present        S/P AVR (aortic valve replacement) (Chronic) ICD-10-CM: Z95.2  ICD-9-CM: V43.3  Unknown - Present    Overview Signed 2/23/2016 11:04 AM by Bjorn Deshpande     Mechanical/Artific             Cardiomyopathy [facility]) (Chronic) ICD-10-CM: I42.9  ICD-9-CM: 425.4  Unknown - Present        Osteopenia ICD-10-CM: M85.80  ICD-9-CM: 733.90  Unknown - Present        HLD (hyperlipidemia) (Chronic) ICD-10-CM: E78.5  ICD-9-CM: 272.4  Unknown - Present        Osteoarthritis ICD-10-CM: M19.90  ICD-9-CM: 715.90  Unknown - Present        ICD (implantable cardioverter-defibrillator) in place ICD-10-CM: Z95.810  ICD-9-CM: V45.02  10/2/2014 - Present    Overview Signed 10/2/2014  4:58 PM by Rachael Giraldo III     Biotronik single-chamber ICD implantation 10/20/14             Diabetes mellitus type 2, diet-controlled ([facility] [address] (Chronic) ICD-10-CM: E11.9  ICD-9-CM: 250.00  8/28/2014 - Present        COPD (chronic obstructive pulmonary disease) ([facility] [address] (Chronic) ICD-10-CM: J44.9  ICD-9-CM: 848  4/2/2014 - Present        REJI (obstructive sleep apnea)-cpap (Chronic) ICD-10-CM: G47.33  ICD-9-CM: 327.23  4/2/2014 - Present        CKD (chronic kidney disease) stage 3, GFR 30-59 ml/min (Chronic) ICD-10-CM: N18.3  ICD-9-CM: 585.3  7/10/2013 - Present        Anticoagulated on Coumadin (Chronic) ICD-10-CM: Z51.81, Z79.01  ICD-9-CM: V58.83, V58.61  7/9/2013 - Present    Overview Signed 7/9/2013  4:16 PM by Mickie Arauz NP     S/P AVR             CAD (coronary artery disease) (Chronic) ICD-10-CM: I25.10  ICD-9-CM: 414.00  1/20/2013 - Present    Overview Signed 5/20/2014 10:05 AM by Zeynep Lorenzana NP     5/8/14 PCI LAD with stent placed             Chronic combined systolic and diastolic heart failure (HCC) (Chronic) ICD-10-CM: I50.42  ICD-9-CM: 428.42  1/20/2013 - Present    Overview Addendum 4/28/2018  4:53 AM by Vaughn Morris MD     5/8/14 ECHO:  EF 10-15%  12/2017:  EF 25-30%             Essential hypertension, benign (Chronic) ICD-10-CM: I10  ICD-9-CM: 401.1  1/20/2013 - Present MDS (myelodysplastic syndrome) (HCC) (Chronic) ICD-10-CM: D46.9  ICD-9-CM: 238.75  12/17/2011 - Present    Overview Addendum 8/29/2013 11:06 AM by Cecelia Gutierrez started in August, 2011 12/18/11 Procrit weekly and Iron stores. 5-12 12-13-12 good response to 3 weekly procrit did not need it last time    3-7-13 Pt doing well. Just wanted a \"check-up. \" Responding to Procrit every three weeks.   8-29-13 patient has missed some injections on recently restarted hemoglobin only issue , takes oral iron iron stores each time                Iron deficiency anemia due to chronic blood loss (Chronic) ICD-10-CM: D50.0  ICD-9-CM: 280.0  7/29/2009 - Present        RESOLVED: CHF (congestive heart failure) (HCC) ICD-10-CM: I50.9  ICD-9-CM: 428.0  2/17/2017 - 4/27/2018        RESOLVED: Routine general medical examination at a health care facility ICD-10-CM: Z00.00  ICD-9-CM: V70.0  12/16/2016 - 4/27/2018        RESOLVED: Chest pain, unspecified ICD-10-CM: R07.9  ICD-9-CM: 786.50  12/7/2016 - 12/16/2016        RESOLVED: Chronic left-sided low back pain without sciatica ICD-10-CM: M54.5, G89.29  ICD-9-CM: 724.2, 338.29  6/15/2016 - 4/27/2018        RESOLVED: Tachycardia ICD-10-CM: R00.0  ICD-9-CM: 785.0  Unknown - 12/16/2016    Overview Signed 2/23/2016 11:02 AM by Elizabeth Marc H/B              RESOLVED: Chronic respiratory failure (HCC) (Chronic) ICD-10-CM: J96.10  ICD-9-CM: 518.83  4/2/2014 - 4/27/2018              Plan:     .              Plan:   S/p fall with bilateral hand fxs, right knee sprain, FELIZ on CKD, anemia with underlying dx of MDS; physical debility       Continue daily physician medical management:  Pneumonia prophylaxis- Incentive spirometer every hour while awake      Digit fxs both hands; cont splint/rachel tape; only restriction is no lifting; has chronic RUE weakness from what I suspect is a RTC injury; xray neg; OT working on modalities  -5/10 dec edema, increase use of fingers  5/11 dc spint; cont rachel taping 3rd and 4th digits right hand and 4th /5th digits on left hand. No lifting but can use hands for ADLs as tolerated      COPD/REJI; pt is on [facility] at [facility]. She manages this at home but having difficulty do to fxs in her hands  -cont Proventil , Pulmicort; Spiriva;  try weaning daytime O2; currently 4-6L due to hypoxia. Doesn't use during day at home per pt; 5/3 NOW reports chronic 3L at home, thus at baseline  -5/3 down from 4 to 2L , sats 98%; 5/4 on 3L  sats 96% (obviously needs due to drop noted to 65% sats when O2 accidentally not hooked up last noc and pt symptomatic with chest pain which resolved immediately when placed back on O2  -5/5 comfortable. No PRUETT, on home 3L NC, lowest sat 91%; 5/10 86% after doing 3 stairs      Severe CHF/cardiomyopathy; compensated currently; cont Coreg, Lasix; has an ICD ; on entresto as well, 5/3  -5/3 no evidence of acute issues; 5/4 compensated well and considering dec Lasix  -5/5 recheck renal fx, may dec lasix  -5/8 dec Lasix due to FELIZ on CKD; compensated; holding entresto  -5/10 restarted Entresto and have decreased Lasix to home dose of 80mg once daily      DVT risk / DVT Prophylaxis- Will require daily physician exam to assess for signs and symptoms as patient is at increased risk for of thromboembolism. Mobilization as tolerated. Intermittent pneumatic compression devices when in bed Thigh-high or knee-high thromboembolic deterrent hose when out of bed. On therapeutic Coumadin       Pain Control: ongoing significant pain in back, shoulders, knees and hands which is stable and controlled by PRN meds. Will require regular pain assessment and comprenhensive pain management,   -has end stage OA of the knees and now with right knee sprain; ice/ace wrap, modalities      Wound Care: Monitor wound status daily per staff and physician. At risk for failure. Will require 24/7 rehab nursing.  None needed but at risk due to immobility   -5/3 hematoma dorsum right hand; warm compress      Hypertension - BP uncontrolled, fluctuating, managed medically. Has hypotension, asymptomatic, parameters with meds   -5/3 119-141 SBP, controlled; 5/4 /66 -; on Coreg, Lasix and Entresto; CHF well compensated; consider decreasing lasix which is dosed at 80mg bid  5/5 bp 141/60; 5/6 117/78 ; 5/9 elevated today 156/83 ; 5/10 improved  HR and BP acceptable      Scleral hemorrhage; clinically unchanged but less periocular swelling      MDS/chronic anemia; monitor , replace as needed, consider epogen  -5/3 hgb stable at 8.4; 5/4 consider dose of epogen but I believe there are restrictions to ordering and may need hematology, will increase iron due to deficiency; recheck 5/6; 8.1  -consult hematology Mond if labs concerning; looking back at past notes; was on weekly procrit if hgb < 10; will order  -transf 1 unit 5/7 ; hbg 9.2 from 8.3; cont epogen weekly. F/u with hematology      FELIZ on CKD; improving, monitor labs and adjust meds; daily wt due to CHF  -creat slowly improving 5/3; 1.53 from 1.71 yesterday, BUN remains unchanged, 50's; monitor labs 3x/wk  -5/7 creat up; push fluids  -5/8 start 1/4 NS at 100 x 2 bags, creat 1.99 with bun 70s; dec Lasix and hold entresto  -5/9 Creat 1.65, bun 65; cont IVFs x 2 bags and hold entresto and cont dec dose Lasix. -5/10 creat 1.46; stopped IVFs , bun 58; recheck 5/14      Chronic coumadin therapy due to mech AVR; goal INR 2-3; inr 2.9; monitor and have pharmacy assist in dosing;   5/4 INR therap at 2.7 on home alternating doses of coumadin (2.5mg M/F and 5mg S/Tue/W/TH/SAT)  -5/9 remains therapeutic on coumadin per Pharmacy dosing; appreciate  -5/11 INR therapeutic at 2.5 on home dosing as noted above  No labs today      ? Hx of DM listed but on no medications ; do have on diet restrictions; carbohydrate restrictions      Urinary retention/ neurogenic bladder - schedule voids q6-8 hrs. Check post-void residual as needed;  In and Out catheterize if post-void residual is more than 400 cc.  -5/4 no issues      bowel program - constipation, added bowel program; 5/4 continent; no const/diarr      GERD - resume PPI. At times may need additional antacids, Maalox prn. Time spent was 15 minutes with over 1/2 in direct patient care/examination, consultation and coordination of care.      Signed By: Glenn Farfan MD     May 12, 2018

## 2018-05-12 NOTE — PROGRESS NOTES
Problem: Falls - Risk of  Goal: *Absence of Falls  Document Tia Fall Risk and appropriate interventions in the flowsheet. Outcome: Progressing Towards Goal  Fall Risk Interventions:  Mobility Interventions: Utilize walker, cane, or other assitive device, Patient to call before getting OOB    Mentation Interventions: Adequate sleep, hydration, pain control    Medication Interventions: Patient to call before getting OOB, Teach patient to arise slowly    Elimination Interventions: Call light in reach, Patient to call for help with toileting needs, Toileting schedule/hourly rounds    History of Falls Interventions: Consult care management for discharge planning, Evaluate medications/consider consulting pharmacy        Problem: Pressure Injury - Risk of  Goal: *Prevention of pressure injury  Document Bahman Scale and appropriate interventions in the flowsheet. Outcome: Progressing Towards Goal  Pressure Injury Interventions:       Moisture Interventions: Absorbent underpads    Activity Interventions: Chair cushion, Increase time out of bed, Pressure redistribution bed/mattress(bed type)    Mobility Interventions: Chair cushion, Pressure redistribution bed/mattress (bed type), Float heels    Nutrition Interventions: Offer support with meals,snacks and hydration    Friction and Shear Interventions: Lift sheet               Comments: Patient calls appropriately for assistance.

## 2018-05-12 NOTE — PROGRESS NOTES
End Of Shift Functional Summary, Nursing      TOILETING:    Does patient need assist with adjusting pants up or down and/or pericare? yes  If yes, please indicate what the patient needs help with:  Pants down,up pericare    Does the patient require extra time? yes  Does the patient require contact guard assistance? yes  Does the patient require more than one staff member for assistance? no    TOILET TRANSFER:    Pt requires minimal assistance. Does the patient require extra time? yes  Does the patient require contact guard assistance? yes slight boost to stand from toilet  Does the patient require more than one staff member for assistance? no    BLADDER:    Pt does not have a medina catheter that staff manages. Pt does not take medication. Pt is continent. of bladder and voids in toilet  Pt requires staff to flush toilet Pt has had 0 bladder accidents during this shift     BOWEL:  Pt does take medication. Pt is continent of bowel and uses toilet. Pt requires staff to flush toilet    Pt has had 0 bowel accidents during this shift   BED/CHAIR TRANSFER  Pt requires minimal assistance. Does the patient require extra time? yes  Does the patient require contact guard assistance? yes slight boost to stand, assistance with foot rest of recliner, assistance managing oxygen tubing  Does the patient require more than one staff member for assistance? no    EATING  Pt requires setup and plate guard. Pt does not wear dentures. Documentation reviewed and plan of care discussed/reviewed with   patient, physician, therapists, oncoming nurse and patient assistant during the shift.

## 2018-05-12 NOTE — PROGRESS NOTES
Warfarin dosing per pharmacist    Kusum Johnson Jasen Connelly is a 77 y.o. female. Height: 5' 2\" (157.5 cm)    Weight: 101.2 kg (223 lb)    Indication:  Mechanical aortic valve replacement    Goal INR:  2-3    Home dose:  2.5 mg Mon, Fri; 5 mg all other days    Risk factors or significant drug interactions:  none    Other anticoagulants:  none    Daily Monitoring  Date  INR     Warfarin dose HGB              Notes  4/28  2.1          5 mg + 2.5 mg 7.4  4/29  2.4  5 mg  7  4/30  2.6  2.5 mg  8  5/1  2.9  5 mg  7.9  5/2  2.9  5 mg  8.5  Pharmacy consulted  5/3  2.7  5 mg  8.4  5/4  2.7  2.5 mg  ---   5/5  3  5 mg  ---   5/6  2.8  5 mg  8.1  5/7  2.8  2.5 mg  8.3  5/8  2.7  5 mg  9.2  5/9  2.5  5 mg  ---   5/10  ---  5 mg  9.5  5/11  2.5  2.5 mg  ---  5/12  ---  5 mg  ---    Pharmacy consulted to dose warfarin for Ms. Corral. She has been transferred to inpatient rehab following hospital stay for complications following a fall. She is followed by [address] [place] [address] [address] [facility] at the [facility] clinic and was therapeutic on her home dose at her last visit on 4/19/2018. INR 2.5 yesterday. No changes. Following INR on MWF currently. Pharmacy will continue to follow. Please call with any questions.     Thank you,  Maldonado Rowe, PharmD  Clinical Pharmacist  [phone]

## 2018-05-12 NOTE — PROGRESS NOTES
PHYSICAL THERAPY DAILY NOTE  Time In: 0840  Time Out: 6996  Patient Seen For: AM;Gait training;Patient education; Therapeutic exercise;Transfer training; Other (see progress notes)    Subjective: patient reporting she is feeling good today. Reports she has more energy and strength today         Objective:Vital Signs:  Visit Vitals    /67 (BP 1 Location: Right leg, BP Patient Position: At rest)    Pulse 63    Temp 98.1 °F (36.7 °C)    Resp 18    Ht 5' 2\" (1.575 m)    Wt 101.2 kg (223 lb)    SpO2 97%    Breastfeeding No    BMI 40.79 kg/m2     Patient on 02 at 3 lpm. Resting 02 sat 95%, 02 sat 87% after ambulating 60 ft    Pain level:2 to 4 out of 10  Pain location:both hands and right knee  Pain interventions:Pain medication, rest, positioning,ROM for right knee    Patient education:Breathing techniques during mobility,transfer training, gait training, fall precautions, activity pacing, body mechanics,energy conservation, Patient verbalizing understanding and demonstrating understanding of patient education. Recommend follow up education. Interdisciplinary Communication:NA    Other (comment) (fall precautions, NWB Left hand)  GROSS ASSESSMENT Daily Assessment     NA       BED/MAT MOBILITY Daily Assessment    Rolling Right : 0 (Not tested)  Rolling Left : 0 (Not tested)  Supine to Sit : 0 (Not tested)  Sit to Supine : 0 (Not tested)       TRANSFERS Daily Assessment   Increased time and effort to complete, cues for body mechanics Transfer Type: SPT without device  Transfer Assistance : 6 (Modified independent)  Sit to Stand Assistance: Supervision  Car Transfers: Not tested       GAIT Daily Assessment    Amount of Assistance: 6 (Modified independent)  Distance (ft): 60 Feet (ft) (60ft x 2 with 5 minute rest break)  Assistive Device:  (No device)   Gait training with cues for managing 02 line and for breathing pattern.  Cues for avoiding talking when walking in order to be able to control breathing pattern    STEPS or STAIRS Daily Assessment    Steps/Stairs Ambulated (#): 0  Level of Assist : 0 (Not tested)       BALANCE Daily Assessment    Sitting - Static: Good (unsupported)  Sitting - Dynamic: Good (unsupported)  Standing - Static: Good  Standing - Dynamic : Impaired       WHEELCHAIR MOBILITY Daily Assessment    Curbs/Ramps Assist Required (FIM Score): 0 (Not tested)  Wheelchair Setup Assist Required : 0 (Not tested)       LOWER EXTREMITY EXERCISES Daily Assessment   Multiple and frequent rest breaks during exercises sets Extremity: Both  Exercise Type #1: Seated lower extremity strengthening  Sets Performed: 1  Reps Performed: 20  Level of Assist: Minimal assistance     SEATED EXERCISES Sets Reps Comments   Ankle Pumps 1 20     Hip Flexion 2 10     Long Arc Quads 2 10     Hip Adduction/Ball Squeeze 1 20     Hip Abduction with red Theraband 1 20     Hamstring Curls with red Theraband 2 10     Hip Extension with red Theraband 2 10             Assessment: Progressing towards goals. Ability to keep 02 sat greater than 88% slowly improving for short distance gait. Sit to stand improved today       Patient returned to room at end of treatment and remained up in recliner with LEs elevated and with needs in reach. 02 at 3 lpm    Plan of Care: Continue with POC and progress as tolerated.      Yobani Oquendo, PT  5/12/2018

## 2018-05-12 NOTE — PROGRESS NOTES
OT Daily Note  Time In 0735   Time Out 0821     Subjective: \"Yes, I'm ready for a shower. \" patient stated, agreeable to therapy  Pain: 7/10; nurse provided pain medication  Interdisciplinary Communication: nurse regarding pain and medication needed  Precautions: Other (comment) (fall precautions; NWB LUE)    Self-Care      05/12/18 0735   Time Spent With Patient   Time In 0735   Time Out 0821   Patient Seen For: AM;ADLs   Feeding/Eating   Feeding/Eating Assistance S   Grooming   Grooming Assistance  SBA   Upper Body Bathing   Bathing Assistance, Upper Max A   Position Performed Seated in chair   Adaptive Equipment Tub bench   Comments requested therapist to complete most of bathing due to limited mobility and use of BUE   Lower Body Bathing   Bathing Assistance, Lower  Mod A   Position Performed Seated in chair   Comments able to stand to bathe madi area   [address] (FIM Score) Max A   Upper [address]  Max A   Comments assistance with right arm and pulling shirt down   Lower Body Dressing    Dressing Assistance  Max A   Position Performed Seated in chair   Comments able to assist in pulling pants up over waist   Functional Transfers   Amount of Assistance Required Min A   Tub or Shower Type Shower   Amount of Assistance Required Min A          Assessment: Ms. Andrew Vidal was willing to participate in therapy and tolerated session well. She completed morning ADLs with increased assistance due to poor functional use of bilateral UE. She completed bathing, transfers, and dressing with therapist facilitation and cuing. Patient continues to remain appropriate for OT services due to functional limitations.     Plan: Continue with POC and current goals for improved functional independence     Vincent Castelan, SUNNY, OTR/L  5/12/18

## 2018-05-12 NOTE — PROGRESS NOTES
Problem: Falls - Risk of  Goal: *Absence of Falls  Document Tia Fall Risk and appropriate interventions in the flowsheet. Outcome: Progressing Towards Goal  Fall Risk Interventions:  Mobility Interventions: Communicate number of staff needed for ambulation/transfer    Mentation Interventions: Door open when patient unattended    Medication Interventions: Teach patient to arise slowly    Elimination Interventions: Patient to call for help with toileting needs    History of Falls Interventions: Evaluate medications/consider consulting pharmacy        Problem: Pressure Injury - Risk of  Goal: *Prevention of pressure injury  Document Bahman Scale and appropriate interventions in the flowsheet.    Outcome: Progressing Towards Goal  Pressure Injury Interventions:       Moisture Interventions: Maintain skin hydration (lotion/cream)    Activity Interventions: Chair cushion, Increase time out of bed    Mobility Interventions: Chair cushion, HOB 30 degrees or less    Nutrition Interventions: Discuss nutritional consult with provider    Friction and Shear Interventions: Apply protective barrier, creams and emollients

## 2018-05-13 PROCEDURE — 94760 N-INVAS EAR/PLS OXIMETRY 1: CPT

## 2018-05-13 PROCEDURE — 65310000000 HC RM PRIVATE REHAB

## 2018-05-13 PROCEDURE — 74011000250 HC RX REV CODE- 250: Performed by: PHYSICAL MEDICINE & REHABILITATION

## 2018-05-13 PROCEDURE — 74011250636 HC RX REV CODE- 250/636: Performed by: PHYSICAL MEDICINE & REHABILITATION

## 2018-05-13 PROCEDURE — 94640 AIRWAY INHALATION TREATMENT: CPT

## 2018-05-13 PROCEDURE — 74011250637 HC RX REV CODE- 250/637: Performed by: PHYSICAL MEDICINE & REHABILITATION

## 2018-05-13 PROCEDURE — 77010033678 HC OXYGEN DAILY

## 2018-05-13 RX ORDER — BUDESONIDE 0.5 MG/2ML
500 INHALANT ORAL
Status: DISCONTINUED | OUTPATIENT
Start: 2018-05-13 | End: 2018-05-16 | Stop reason: HOSPADM

## 2018-05-13 RX ORDER — ALBUTEROL SULFATE 0.83 MG/ML
2.5 SOLUTION RESPIRATORY (INHALATION)
Status: DISCONTINUED | OUTPATIENT
Start: 2018-05-13 | End: 2018-05-16 | Stop reason: HOSPADM

## 2018-05-13 RX ADMIN — FERROUS SULFATE TAB 325 MG (65 MG ELEMENTAL FE) 325 MG: 325 (65 FE) TAB at 08:38

## 2018-05-13 RX ADMIN — FUROSEMIDE 80 MG: 40 TABLET ORAL at 08:38

## 2018-05-13 RX ADMIN — SENNOSIDES 8.6 MG: 8.6 TABLET, FILM COATED ORAL at 08:38

## 2018-05-13 RX ADMIN — WARFARIN SODIUM 5 MG: 2.5 TABLET ORAL at 18:09

## 2018-05-13 RX ADMIN — CARVEDILOL 3.12 MG: 3.12 TABLET, FILM COATED ORAL at 08:38

## 2018-05-13 RX ADMIN — SIMVASTATIN 20 MG: 20 TABLET, FILM COATED ORAL at 20:44

## 2018-05-13 RX ADMIN — BUDESONIDE 500 MCG: 0.5 INHALANT RESPIRATORY (INHALATION) at 17:21

## 2018-05-13 RX ADMIN — BUDESONIDE 500 MCG: 0.5 INHALANT RESPIRATORY (INHALATION) at 05:04

## 2018-05-13 RX ADMIN — OXYCODONE HYDROCHLORIDE 10 MG: 5 TABLET ORAL at 22:13

## 2018-05-13 RX ADMIN — ESCITALOPRAM 10 MG: 10 TABLET, FILM COATED ORAL at 08:38

## 2018-05-13 RX ADMIN — OXYCODONE HYDROCHLORIDE 10 MG: 5 TABLET ORAL at 18:13

## 2018-05-13 RX ADMIN — ERYTHROPOIETIN 4000 UNITS: 4000 INJECTION, SOLUTION INTRAVENOUS; SUBCUTANEOUS at 17:00

## 2018-05-13 RX ADMIN — CARVEDILOL 3.12 MG: 3.12 TABLET, FILM COATED ORAL at 17:45

## 2018-05-13 RX ADMIN — SACUBITRIL AND VALSARTAN 1 TABLET: 24; 26 TABLET, FILM COATED ORAL at 17:45

## 2018-05-13 RX ADMIN — FERROUS SULFATE TAB 325 MG (65 MG ELEMENTAL FE) 325 MG: 325 (65 FE) TAB at 17:45

## 2018-05-13 RX ADMIN — ALBUTEROL SULFATE 2.5 MG: 2.5 SOLUTION RESPIRATORY (INHALATION) at 17:21

## 2018-05-13 RX ADMIN — ALBUTEROL SULFATE 2.5 MG: 2.5 SOLUTION RESPIRATORY (INHALATION) at 05:04

## 2018-05-13 RX ADMIN — POLYETHYLENE GLYCOL (3350) 17 G: 17 POWDER, FOR SOLUTION ORAL at 08:38

## 2018-05-13 RX ADMIN — OXYCODONE HYDROCHLORIDE 10 MG: 5 TABLET ORAL at 08:37

## 2018-05-13 RX ADMIN — TIOTROPIUM BROMIDE 18 MCG: 18 CAPSULE ORAL; RESPIRATORY (INHALATION) at 05:06

## 2018-05-13 RX ADMIN — SACUBITRIL AND VALSARTAN 1 TABLET: 24; 26 TABLET, FILM COATED ORAL at 08:38

## 2018-05-13 NOTE — PROGRESS NOTES
PT not ready to go to bed (up in recliner). Will have nurse call when she is ready for sleep and needing assist with her Trilogy BIPAP.

## 2018-05-13 NOTE — PROGRESS NOTES
End Of Shift Functional Summary, Nursing      TOILETING:    Does patient need assist with adjusting pants up or down and/or pericare? no  Does the patient require extra time? yes  Does the patient require standby assistance? yes  Does the patient require contact guard assistance? yes  Does the patient require more than one staff member for assistance? no    TOILET TRANSFER:    Pt requires minimal assistance. Does the patient require extra time? yes  Does the patient require contact guard assistance? yes  Does the patient require more than one staff member for assistance? no    BLADDER:    Pt does not have a medina catheter that staff manages. Pt does not take medication. If so, please indicate which medication. Pt is continent. of bladder and voids in toilet. Pt has had 0 bladder accidents during this shift. BOWEL:  Pt does take medication. Pt is continent of bowel and uses toilet. Pt has had 0 bowel accidents during this shift. BED/CHAIR TRANSFER  Pt requires minimal assistance. Does the patient require extra time? yes  Does the patient require contact guard assistance? yes  Does the patient require more than one staff member for assistance? yes      EATING  Pt requires setup. Pt wears dentures. TUBE FEEDINGS:  Pt does not  receive nutrition through tube feedings. Documentation reviewed and plan of care discussed/reviewed with   patient, physician, oncoming nurse and patient assistant during the shift.

## 2018-05-13 NOTE — PROGRESS NOTES
Problem: Falls - Risk of  Goal: *Absence of Falls  Document Tia Fall Risk and appropriate interventions in the flowsheet.    Outcome: Progressing Towards Goal  Fall Risk Interventions:  Mobility Interventions: Patient to call before getting OOB, Utilize walker, cane, or other assitive device    Mentation Interventions: Adequate sleep, hydration, pain control, Door open when patient unattended    Medication Interventions: Evaluate medications/consider consulting pharmacy, Patient to call before getting OOB    Elimination Interventions: Call light in reach, Patient to call for help with toileting needs    History of Falls Interventions: Consult care management for discharge planning

## 2018-05-13 NOTE — PROGRESS NOTES
End Of Shift Functional Summary, Nursing  Hourly rounding completed throughout shift        TOILETING:    Does patient need assist with adjusting pants up or down and/or pericare? yes  If yes, please indicate what the patient needs help with:  Pants down,up pericare     Does the patient require extra time? yes  Does the patient require contact guard assistance? yes  Does the patient require more than one staff member for assistance? no     TOILET TRANSFER:    Pt requires minimal assistance. Does the patient require extra time? yes  Does the patient require contact guard assistance? yes slight boost to stand from toilet  Does the patient require more than one staff member for assistance? no     BLADDER:    Pt does not have a medina catheter that staff manages. Pt does not take medication. Pt is continent. of bladder and voids in toilet  Pt requires staff to flush toilet Pt has had 0 bladder accidents during this shift      BOWEL:  Pt does take medication. Pt is continent of bowel and uses toilet. Pt requires staff to flush toilet    Pt has had 0 bowel accidents during this shift   BED/CHAIR TRANSFER  Pt requires minimal assistance. Does the patient require extra time? yes  Does the patient require contact guard assistance? yes slight boost to stand, assistance with foot rest of recliner, assistance managing oxygen tubing  Does the patient require more than one staff member for assistance? no     EATING  Pt requires setup and plate guard.   Pt does not wear dentures.         Documentation reviewed and plan of care discussed/reviewed with   patient, physician, therapists, oncoming nurse and patient assistant during the shift.

## 2018-05-13 NOTE — PROGRESS NOTES
Karyle Furl, MD,   Medical Director  [address] [facility], [address] [facility]  Tel: [phone]       [facility] PROGRESS NOTE    Leora Beltran  Admit Date: 5/2/2018  Admit Diagnosis: Debillity   Physical debility    Subjective     Feeling sore today. Thinks its bc of all her working out. Hands achy today. In good spirits. No cp, sob,n     Patient seen and examined. No pain complaints. Slept well overnight. Denies dizziness, palpations, SOB, cough or nausea. Participating in therapy.     Objective:     Current Facility-Administered Medications   Medication Dose Route Frequency    furosemide (LASIX) tablet 80 mg  80 mg Oral DAILY    0.9% sodium chloride infusion 250 mL  250 mL IntraVENous PRN    diphenhydrAMINE (BENADRYL) capsule 25 mg  25 mg Oral Q6H PRN    epoetin jairo (EPOGEN;PROCRIT) injection 4,000 Units  4,000 Units SubCUTAneous Q7D    ferrous sulfate tablet 325 mg  1 Tab Oral BID WITH MEALS    acetaminophen (TYLENOL) tablet 650 mg  650 mg Oral Q4H PRN    bisacodyl (DULCOLAX) tablet 5 mg  5 mg Oral DAILY PRN    LORazepam (ATIVAN) tablet 1 mg  1 mg Oral BID PRN    naloxone (NARCAN) injection 0.4 mg  0.4 mg IntraVENous PRN    prochlorperazine (COMPAZINE) injection 5 mg  5 mg IntraVENous Q8H PRN    sodium chloride (NS) flush 5-10 mL  5-10 mL IntraVENous PRN    albuterol (PROVENTIL VENTOLIN) nebulizer solution 2.5 mg  2.5 mg Nebulization Q4H PRN    bisacodyl (DULCOLAX) suppository 10 mg  10 mg Rectal DAILY PRN    budesonide (PULMICORT) 500 mcg/2 ml nebulizer suspension  500 mcg Nebulization BID RT    And    albuterol (PROVENTIL VENTOLIN) nebulizer solution 2.5 mg  2.5 mg Nebulization Q6HWA RT    carvedilol (COREG) tablet 3.125 mg  3.125 mg Oral BID WITH MEALS    escitalopram oxalate (LEXAPRO) tablet 10 mg  10 mg Oral DAILY    loratadine (CLARITIN) tablet 10 mg  10 mg Oral DAILY PRN    oxyCODONE IR (ROXICODONE) tablet 10 mg  10 mg Oral Q4H PRN    polyethylene glycol (MIRALAX) packet 17 g  17 g Oral DAILY    sacubitril-valsartan (ENTRESTO) 24-26 mg tablet 1 Tab  1 Tab Oral BID    senna (SENOKOT) tablet 8.6 mg  1 Tab Oral DAILY    simvastatin (ZOCOR) tablet 20 mg  20 mg Oral QHS    tiotropium (SPIRIVA) inhalation capsule 18 mcg  1 Cap Inhalation DAILY    traMADol (ULTRAM) tablet 50 mg  50 mg Oral Q6H PRN    warfarin (COUMADIN) tablet 2.5 mg  2.5 mg Oral Once per day on Mon Fri    warfarin (COUMADIN) tablet 5 mg  5 mg Oral Once per day on Sun Tue Wed Thu Sat     Review of Systems:Denies chest pain, shortness of breath, cough, headache, visual problems, abdominal pain, dysurea, calf pain. Pertinent positives are as noted in the medical records and unremarkable otherwise.   + PRUETT; no more than usual    Visit Vitals    /72 (BP 1 Location: Right arm, BP Patient Position: At rest)    Pulse 64    Temp 98.1 °F (36.7 °C)    Resp 16    Ht 5' 2\" (1.575 m)    Wt 101.2 kg (223 lb)    SpO2 98%    Breastfeeding No    BMI 40.79 kg/m2        Physical Exam:   General: Alert and age appropriately oriented. Pleasant affect, up in chair in room. No acute cardio respiratory distress. HEENT: Normocephalic,no scleral icterus; conjunctival hemorrhage  Oral mucosa moist without cyanosis   Lungs: clear to auscultation bilaterally  Respiration even and unlabored   Heart: Regular rate and rhythm, S1, S2   No  murmurs, clicks, rub or gallops   Abdomen: Soft, non-tender, nondistended. Bowel sounds present. No organomegaly. obese   Genitourinary: defer . Neuromuscular:      Grossly new focal motor deficits noted. Moves ankles. Ankle dorsiflexion 5/5  Ankle plantarflexion 5/5  Exam limited by pain due to chronic right shoulder pain/weakness   Skin/extremity: No rashes, no erythema.  No calf tenderness BLE  Dorsum of bilateral hands less swollen, moving fingers well                                                                            Functional Assessment: Balance  Sitting - Static: Good (unsupported) (05/12/18 1200)  Sitting - Dynamic: Good (unsupported) (05/12/18 1200)  Standing - Static: Good (05/12/18 1200)  Standing - Dynamic : Impaired (05/12/18 1200)       Feeding  Adaptive Equipment: Built up spoon;Non-skid surface;Plate guard (14/73/16 1045)             Juaquin Benitez Fall Risk Assessment:  Juaquin Benitez Fall Risk  Mobility: Ambulates or transfers with assist devices or assistance (05/13/18 0730)  Mobility Interventions: Patient to call before getting OOB;Utilize walker, cane, or other assitive device (05/13/18 0730)  Mentation: Alert, oriented x 3 (05/13/18 0730)  Mentation Interventions: Adequate sleep, hydration, pain control;Door open when patient unattended (05/13/18 0730)  Medication: Patient receiving anticonvulsants, sedatives(tranquilizers), psychotropics or hypnotics, hypoglycemics, narcotics, sleep aids, antihypertensives, laxatives, or diuretics (05/13/18 0730)  Medication Interventions: Evaluate medications/consider consulting pharmacy; Patient to call before getting OOB (05/13/18 0730)  Elimination: Needs assistance with toileting (05/13/18 0730)  Elimination Interventions: Call light in reach; Patient to call for help with toileting needs (05/13/18 0730)  Prior Fall History: Before admission in past 12 months _home or previous inpatient care) (05/13/18 0730)  History of Falls Interventions: Consult care management for discharge planning (05/13/18 0730)  Total Score: 4 (05/13/18 0730)  Standard Fall Precautions: Yes (05/03/18 2999)  High Fall Risk: Yes (05/13/18 0730)     Speech Assessment:         Ambulation:  Gait  Distance (ft): 60 Feet (ft) (60ft x 2 with 5 minute rest break) (05/12/18 1200)  Assistive Device:  (No device) (05/12/18 1200)  Rail Use: None (05/11/18 1100)     Labs/Studies:  Recent Results (from the past 72 hour(s))   PROTHROMBIN TIME + INR    Collection Time: 05/11/18  5:42 AM   Result Value Ref Range    Prothrombin time 26.2 (H) 11.5 - 14.5 sec    INR 2.5     METABOLIC PANEL, BASIC    Collection Time: 05/11/18  5:42 AM   Result Value Ref Range    Sodium 142 136 - 145 mmol/L    Potassium 4.7 3.5 - 5.1 mmol/L    Chloride 102 98 - 107 mmol/L    CO2 33 (H) 21 - 32 mmol/L    Anion gap 7 7 - 16 mmol/L    Glucose 107 (H) 65 - 100 mg/dL    BUN 52 (H) 8 - 23 MG/DL    Creatinine 1.55 (H) 0.6 - 1.0 MG/DL    GFR est AA 43 (L) >60 ml/min/1.73m2    GFR est non-AA 35 (L) >60 ml/min/1.73m2    Calcium 9.3 8.3 - 10.4 MG/DL       Assessment:     Problem List as of 5/13/2018  Date Reviewed: 4/27/2018          Codes Class Noted - Resolved    * (Principal)Physical debility ICD-10-CM: R53.81  ICD-9-CM: 799.3  5/2/2018 - Present        Closed nondisplaced fracture of shaft of fifth metacarpal bone of left hand ICD-10-CM: S62.357A  ICD-9-CM: 815.03  4/28/2018 - Present        Subdural hematoma (HCC) ICD-10-CM: I62.00  ICD-9-CM: 432.1  4/27/2018 - Present        Long term (current) use of anticoagulants (Chronic) ICD-10-CM: Z79.01  ICD-9-CM: V58.61  4/19/2018 - Present        Dysthymia ICD-10-CM: F34.1  ICD-9-CM: 300.4  4/5/2018 - Present        Type 2 diabetes mellitus with nephropathy (HCC) (Chronic) ICD-10-CM: E11.21  ICD-9-CM: 250.40, 583.81  12/18/2017 - Present        Pulmonary hypertension (HCC) (Chronic) ICD-10-CM: I27.20  ICD-9-CM: 416.8  6/15/2016 - Present        S/P AVR (aortic valve replacement) (Chronic) ICD-10-CM: Z95.2  ICD-9-CM: V43.3  Unknown - Present    Overview Signed 2/23/2016 11:04 AM by Micky Dejesus     Mechanical/Artific             Cardiomyopathy ([address] (Chronic) ICD-10-CM: I42.9  ICD-9-CM: 425.4  Unknown - Present        Osteopenia ICD-10-CM: M85.80  ICD-9-CM: 733.90  Unknown - Present        HLD (hyperlipidemia) (Chronic) ICD-10-CM: E78.5  ICD-9-CM: 272.4  Unknown - Present        Osteoarthritis ICD-10-CM: M19.90  ICD-9-CM: 715.90  Unknown - Present        ICD (implantable cardioverter-defibrillator) in place ICD-10-CM: Z95.810  ICD-9-CM: V45.02  10/2/2014 - Present    Overview Signed 10/2/2014  4:58 PM by Theopolis Mettle     Biotronik single-chamber ICD implantation 10/20/14             Diabetes mellitus type 2, diet-controlled ([facility] [address] (Chronic) ICD-10-CM: E11.9  ICD-9-CM: 250.00  8/28/2014 - Present        COPD (chronic obstructive pulmonary disease) ([facility] [address] (Chronic) ICD-10-CM: J44.9  ICD-9-CM: 496  4/2/2014 - Present        REJI (obstructive sleep apnea)-cpap (Chronic) ICD-10-CM: G47.33  ICD-9-CM: 327.23  4/2/2014 - Present        CKD (chronic kidney disease) stage 3, GFR 30-59 ml/min (Chronic) ICD-10-CM: N18.3  ICD-9-CM: 585.3  7/10/2013 - Present        Anticoagulated on Coumadin (Chronic) ICD-10-CM: Z51.81, Z79.01  ICD-9-CM: V58.83, V58.61  7/9/2013 - Present    Overview Signed 7/9/2013  4:16 PM by Rob Dow NP     S/P AVR             CAD (coronary artery disease) (Chronic) ICD-10-CM: I25.10  ICD-9-CM: 414.00  1/20/2013 - Present    Overview Signed 5/20/2014 10:05 AM by Rickie Omalley NP     5/8/14 PCI LAD with stent placed             Chronic combined systolic and diastolic heart failure (HCC) (Chronic) ICD-10-CM: I50.42  ICD-9-CM: 428.42  1/20/2013 - Present    Overview Addendum 4/28/2018  4:53 AM by Iesha Tobar MD     5/8/14 ECHO:  EF 10-15%  12/2017:  EF 25-30%             Essential hypertension, benign (Chronic) ICD-10-CM: I10  ICD-9-CM: 401.1  1/20/2013 - Present        MDS (myelodysplastic syndrome) (HCC) (Chronic) ICD-10-CM: D46.9  ICD-9-CM: 238.75  12/17/2011 - Present    Overview Addendum 8/29/2013 11:06 AM by Atul Perdue started in August, 2011 12/18/11 Procrit weekly and Iron stores. 5-12 12-13-12 good response to 3 weekly procrit did not need it last time    3-7-13 Pt doing well. Just wanted a \"check-up. \" Responding to Procrit every three weeks.   8-29-13 patient has missed some injections on recently restarted hemoglobin only issue , takes oral iron iron stores each time                Iron deficiency anemia due to chronic blood loss (Chronic) ICD-10-CM: D50.0  ICD-9-CM: 280.0  7/29/2009 - Present        RESOLVED: CHF (congestive heart failure) (HCC) ICD-10-CM: I50.9  ICD-9-CM: 428.0  2/17/2017 - 4/27/2018        RESOLVED: Routine general medical examination at a health care facility ICD-10-CM: Z00.00  ICD-9-CM: V70.0  12/16/2016 - 4/27/2018        RESOLVED: Chest pain, unspecified ICD-10-CM: R07.9  ICD-9-CM: 786.50  12/7/2016 - 12/16/2016        RESOLVED: Chronic left-sided low back pain without sciatica ICD-10-CM: M54.5, G89.29  ICD-9-CM: 724.2, 338.29  6/15/2016 - 4/27/2018        RESOLVED: Tachycardia ICD-10-CM: R00.0  ICD-9-CM: 785.0  Unknown - 12/16/2016    Overview Signed 2/23/2016 11:02 AM by Bjorn Marc H/B              RESOLVED: Chronic respiratory failure (HCC) (Chronic) ICD-10-CM: J96.10  ICD-9-CM: 518.83  4/2/2014 - 4/27/2018              Plan:     .              Plan:   S/p fall with bilateral hand fxs, right knee sprain, FELIZ on CKD, anemia with underlying dx of MDS; physical debility       Continue daily physician medical management:  Pneumonia prophylaxis- Incentive spirometer every hour while awake      Digit fxs both hands; cont splint/rachel tape; only restriction is no lifting; has chronic RUE weakness from what I suspect is a RTC injury; xray neg; OT working on modalities  -5/10 dec edema, increase use of fingers  5/11 dc spint; cont rachel taping 3rd and 4th digits right hand and 4th /5th digits on left hand. No lifting but can use hands for ADLs as tolerated      COPD/REJI; pt is on Trilogy at [facility]. She manages this at home but having difficulty do to fxs in her hands  -cont Proventil , Pulmicort; Spiriva;  try weaning daytime O2; currently 4-6L due to hypoxia.  Doesn't use during day at home per pt; 5/3 NOW reports chronic 3L at home, thus at baseline  -5/3 down from 4 to 2L , sats 98%; 5/4 on 3L  sats 96% (obviously needs due to drop noted to 65% sats when O2 accidentally not hooked up last noc and pt symptomatic with chest pain which resolved immediately when placed back on O2  -5/5 comfortable. No PRUETT, on home 3L NC, lowest sat 91%; 5/10 86% after doing 3 stairs  - 5/13 albuterol nebs tapered to bid      Severe CHF/cardiomyopathy; compensated currently; cont Coreg, Lasix; has an ICD ; on entresto as well, 5/3  -5/3 no evidence of acute issues; 5/4 compensated well and considering dec Lasix  -5/5 recheck renal fx, may dec lasix  -5/8 dec Lasix due to FELIZ on CKD; compensated; holding entresto  -5/10 restarted Entresto and have decreased Lasix to home dose of 80mg once daily      DVT risk / DVT Prophylaxis- Will require daily physician exam to assess for signs and symptoms as patient is at increased risk for of thromboembolism. Mobilization as tolerated. Intermittent pneumatic compression devices when in bed Thigh-high or knee-high thromboembolic deterrent hose when out of bed. On therapeutic Coumadin       Pain Control: ongoing significant pain in back, shoulders, knees and hands which is stable and controlled by PRN meds. Will require regular pain assessment and comprenhensive pain management,   -has end stage OA of the knees and now with right knee sprain; ice/ace wrap, modalities      Wound Care: Monitor wound status daily per staff and physician. At risk for failure. Will require 24/7 rehab nursing. None needed but at risk due to immobility   -5/3 hematoma dorsum right hand; warm compress      Hypertension - BP uncontrolled, fluctuating, managed medically.  Has hypotension, asymptomatic, parameters with meds   -5/3 119-141 SBP, controlled; 5/4 /66 -; on Coreg, Lasix and Entresto; CHF well compensated; consider decreasing lasix which is dosed at 80mg bid  5/5 bp 141/60; 5/6 117/78 ; 5/9 elevated today 156/83 ; 5/10 improved  HR and BP acceptable      Scleral hemorrhage; clinically unchanged but less periocular swelling      MDS/chronic anemia; monitor , replace as needed, consider epogen  -5/3 hgb stable at 8.4; 5/4 consider dose of epogen but I believe there are restrictions to ordering and may need hematology, will increase iron due to deficiency; recheck 5/6; 8.1  -consult hematology Mond if labs concerning; looking back at past notes; was on weekly procrit if hgb < 10; will order  -transf 1 unit 5/7 ; hbg 9.2 from 8.3; cont epogen weekly. F/u with hematology      FELIZ on CKD; improving, monitor labs and adjust meds; daily wt due to CHF  -creat slowly improving 5/3; 1.53 from 1.71 yesterday, BUN remains unchanged, 50's; monitor labs 3x/wk  -5/7 creat up; push fluids  -5/8 start 1/4 NS at 100 x 2 bags, creat 1.99 with bun 70s; dec Lasix and hold entresto  -5/9 Creat 1.65, bun 65; cont IVFs x 2 bags and hold entresto and cont dec dose Lasix. -5/10 creat 1.46; stopped IVFs , bun 58; recheck 5/14      Chronic coumadin therapy due to mech AVR; goal INR 2-3; inr 2.9; monitor and have pharmacy assist in dosing;   5/4 INR therap at 2.7 on home alternating doses of coumadin (2.5mg M/F and 5mg S/Tue/W/TH/SAT)  -5/9 remains therapeutic on coumadin per Pharmacy dosing; appreciate  -5/11 INR therapeutic at 2.5 on home dosing as noted above  No labs over the weekend, PT/INR tomorrow      ? Hx of DM listed but on no medications ; do have on diet restrictions; carbohydrate restrictions      Urinary retention/ neurogenic bladder - schedule voids q6-8 hrs. Check post-void residual as needed; In and Out catheterize if post-void residual is more than 400 cc.  -5/4 no issues      bowel program - constipation, added bowel program; 5/4 continent; no const/diarr      GERD - resume PPI. At times may need additional antacids, Maalox prn. Time spent was 15 minutes with over 1/2 in direct patient care/examination, consultation and coordination of care.      Signed By: Michelle Han MD     May 13, 2018

## 2018-05-13 NOTE — PROGRESS NOTES
Warfarin dosing per pharmacist    Bambi Glynn Marino Cornelius is a 77 y.o. female. Height: 5' 2\" (157.5 cm)    Weight: 101.2 kg (223 lb)    Indication:  Mechanical aortic valve replacement    Goal INR:  2-3    Home dose:  2.5 mg Mon, Fri; 5 mg all other days    Risk factors or significant drug interactions:  none    Other anticoagulants:  none    Daily Monitoring  Date  INR     Warfarin dose HGB              Notes  4/28  2.1          5 mg + 2.5 mg 7.4  4/29  2.4  5 mg  7  4/30  2.6  2.5 mg  8  5/1  2.9  5 mg  7.9  5/2  2.9  5 mg  8.5  Pharmacy consulted  5/3  2.7  5 mg  8.4  5/4  2.7  2.5 mg  ---   5/5  3  5 mg  ---   5/6  2.8  5 mg  8.1  5/7  2.8  2.5 mg  8.3  5/8  2.7  5 mg  9.2  5/9  2.5  5 mg  ---   5/10  ---  5 mg  9.5  5/11  2.5  2.5 mg  ---  5/12  ---  5 mg  ---  5/13  ---  5 mg  ---    Pharmacy consulted to dose warfarin for Ms. Corral. She has been transferred to inpatient rehab following hospital stay for complications following a fall. She is followed by [facility] at the anti-coag clinic and was therapeutic on her home dose at her last visit on 4/19/2018. INR 2.5 on Friday. No changes. Following INR on [facility] currently. Pharmacy will continue to follow. Please call with any questions.     Thank you,  Marcel Kelly, PharmD  Clinical Pharmacist  [phone]

## 2018-05-14 LAB
ANION GAP SERPL CALC-SCNC: 7 MMOL/L (ref 7–16)
BUN SERPL-MCNC: 59 MG/DL (ref 8–23)
CALCIUM SERPL-MCNC: 8.6 MG/DL (ref 8.3–10.4)
CHLORIDE SERPL-SCNC: 101 MMOL/L (ref 98–107)
CO2 SERPL-SCNC: 33 MMOL/L (ref 21–32)
CREAT SERPL-MCNC: 1.85 MG/DL (ref 0.6–1)
GLUCOSE SERPL-MCNC: 111 MG/DL (ref 65–100)
HCT VFR BLD AUTO: 27.3 % (ref 35.8–46.3)
HGB BLD-MCNC: 8.4 G/DL (ref 11.7–15.4)
INR PPP: 2.6
POTASSIUM SERPL-SCNC: 4.6 MMOL/L (ref 3.5–5.1)
PROTHROMBIN TIME: 27.2 SEC (ref 11.5–14.5)
SODIUM SERPL-SCNC: 141 MMOL/L (ref 136–145)

## 2018-05-14 PROCEDURE — 97116 GAIT TRAINING THERAPY: CPT

## 2018-05-14 PROCEDURE — 80048 BASIC METABOLIC PNL TOTAL CA: CPT | Performed by: PHYSICAL MEDICINE & REHABILITATION

## 2018-05-14 PROCEDURE — 99223 1ST HOSP IP/OBS HIGH 75: CPT | Performed by: INTERNAL MEDICINE

## 2018-05-14 PROCEDURE — 85610 PROTHROMBIN TIME: CPT | Performed by: PHYSICAL MEDICINE & REHABILITATION

## 2018-05-14 PROCEDURE — 65310000000 HC RM PRIVATE REHAB

## 2018-05-14 PROCEDURE — 97530 THERAPEUTIC ACTIVITIES: CPT

## 2018-05-14 PROCEDURE — 97150 GROUP THERAPEUTIC PROCEDURES: CPT

## 2018-05-14 PROCEDURE — 85018 HEMOGLOBIN: CPT | Performed by: PHYSICAL MEDICINE & REHABILITATION

## 2018-05-14 PROCEDURE — 77010033678 HC OXYGEN DAILY

## 2018-05-14 PROCEDURE — 77030020257 HC SOL INJ SOD CL 0.45% 1000ML BG

## 2018-05-14 PROCEDURE — 99232 SBSQ HOSP IP/OBS MODERATE 35: CPT | Performed by: PHYSICAL MEDICINE & REHABILITATION

## 2018-05-14 PROCEDURE — 74011000258 HC RX REV CODE- 258: Performed by: PHYSICAL MEDICINE & REHABILITATION

## 2018-05-14 PROCEDURE — 97535 SELF CARE MNGMENT TRAINING: CPT

## 2018-05-14 PROCEDURE — 36415 COLL VENOUS BLD VENIPUNCTURE: CPT | Performed by: PHYSICAL MEDICINE & REHABILITATION

## 2018-05-14 PROCEDURE — 97110 THERAPEUTIC EXERCISES: CPT

## 2018-05-14 PROCEDURE — 74011250637 HC RX REV CODE- 250/637: Performed by: PHYSICAL MEDICINE & REHABILITATION

## 2018-05-14 PROCEDURE — 82272 OCCULT BLD FECES 1-3 TESTS: CPT | Performed by: PHYSICAL MEDICINE & REHABILITATION

## 2018-05-14 PROCEDURE — 74011000250 HC RX REV CODE- 250: Performed by: PHYSICAL MEDICINE & REHABILITATION

## 2018-05-14 PROCEDURE — 94760 N-INVAS EAR/PLS OXIMETRY 1: CPT

## 2018-05-14 PROCEDURE — 94640 AIRWAY INHALATION TREATMENT: CPT

## 2018-05-14 RX ORDER — SODIUM CHLORIDE 450 MG/100ML
100 INJECTION, SOLUTION INTRAVENOUS CONTINUOUS
Status: DISPENSED | OUTPATIENT
Start: 2018-05-14 | End: 2018-05-15

## 2018-05-14 RX ADMIN — SODIUM CHLORIDE 100 ML/HR: 450 INJECTION, SOLUTION INTRAVENOUS at 21:12

## 2018-05-14 RX ADMIN — SIMVASTATIN 20 MG: 20 TABLET, FILM COATED ORAL at 21:11

## 2018-05-14 RX ADMIN — FERROUS SULFATE TAB 325 MG (65 MG ELEMENTAL FE) 325 MG: 325 (65 FE) TAB at 08:50

## 2018-05-14 RX ADMIN — FUROSEMIDE 80 MG: 40 TABLET ORAL at 08:50

## 2018-05-14 RX ADMIN — SACUBITRIL AND VALSARTAN 1 TABLET: 24; 26 TABLET, FILM COATED ORAL at 17:28

## 2018-05-14 RX ADMIN — CARVEDILOL 3.12 MG: 3.12 TABLET, FILM COATED ORAL at 17:28

## 2018-05-14 RX ADMIN — ESCITALOPRAM 10 MG: 10 TABLET, FILM COATED ORAL at 08:50

## 2018-05-14 RX ADMIN — POLYETHYLENE GLYCOL (3350) 17 G: 17 POWDER, FOR SOLUTION ORAL at 08:47

## 2018-05-14 RX ADMIN — SENNOSIDES 8.6 MG: 8.6 TABLET, FILM COATED ORAL at 08:51

## 2018-05-14 RX ADMIN — WARFARIN SODIUM 2.5 MG: 2.5 TABLET ORAL at 17:49

## 2018-05-14 RX ADMIN — ALBUTEROL SULFATE 2.5 MG: 2.5 SOLUTION RESPIRATORY (INHALATION) at 17:52

## 2018-05-14 RX ADMIN — BUDESONIDE 500 MCG: 0.5 INHALANT RESPIRATORY (INHALATION) at 05:08

## 2018-05-14 RX ADMIN — BUDESONIDE 500 MCG: 0.5 INHALANT RESPIRATORY (INHALATION) at 17:52

## 2018-05-14 RX ADMIN — SODIUM CHLORIDE 100 ML/HR: 450 INJECTION, SOLUTION INTRAVENOUS at 12:12

## 2018-05-14 RX ADMIN — OXYCODONE HYDROCHLORIDE 10 MG: 5 TABLET ORAL at 12:20

## 2018-05-14 RX ADMIN — ALBUTEROL SULFATE 2.5 MG: 2.5 SOLUTION RESPIRATORY (INHALATION) at 05:08

## 2018-05-14 RX ADMIN — TRAMADOL HYDROCHLORIDE 50 MG: 50 TABLET, FILM COATED ORAL at 08:55

## 2018-05-14 RX ADMIN — FERROUS SULFATE TAB 325 MG (65 MG ELEMENTAL FE) 325 MG: 325 (65 FE) TAB at 17:28

## 2018-05-14 RX ADMIN — TIOTROPIUM BROMIDE 18 MCG: 18 CAPSULE ORAL; RESPIRATORY (INHALATION) at 05:08

## 2018-05-14 RX ADMIN — OXYCODONE HYDROCHLORIDE 10 MG: 5 TABLET ORAL at 17:28

## 2018-05-14 NOTE — PROGRESS NOTES
05/14/18 1014   Time Spent With Patient   Time In 0746   Time Out 0831   Patient Seen For: AM;ADLs   Feeding/Eating   Feeding/Eating Assistance S   Comments s/u assist   Upper Body Bathing   Bathing Assistance, Upper Max A   Lower Body Bathing   Bathing Assistance, Lower  Max A   Position Performed Seated in chair;Standing   Upper Body Dressing    Dressing Assistance  Max A   Lower Body Dressing    Dressing Assistance  Max A     S: \"I am just ready to go home, I want go just get better, I wish I never fell. \" Agreeable to therapy. Focus of session was on morning ADL routine. Patient was able to ambulate ~10 feet using a HHA with SBA. Pain not indicated during session. Collaborated with Mat SPANGLER and confirmed patient is on track to reach goals as documented in the care plan. Patient tolerated session well, but strength, balance, activity tolerance are still below baseline and requires skilled facilitation to successfully and safely complete ADL's and transfers. Patient ended session in recliner with call remote and phone within reach.      Ritesh Parker OTR

## 2018-05-14 NOTE — PROGRESS NOTES
End Of Shift Functional Summary, Nursing        TOILETING:    Does patient need assist with adjusting pants up or down and/or pericare? Yes. Needs help with pericare. Does the patient require extra time? yes  Does the patient require standby assistance? yes  Does the patient require contact guard assistance? yes  Does the patient require more than one staff member for assistance? no     TOILET TRANSFER:    Pt requires minimal assistance. Does the patient require extra time? yes  Does the patient require contact guard assistance? yes  Does the patient require more than one staff member for assistance? no     BLADDER:    Pt does not have a medina catheter that staff manages. Pt does not take medication. If so, please indicate which medication. Pt is continent. of bladder and voids in toilet. Pt has had 0 bladder accidents during this shift.      BOWEL:  Pt does take medication. Pt is continent of bowel and uses toilet. Pt has had 0 bowel accidents during this shift.     BED/CHAIR TRANSFER  Pt requires minimal assistance. Does the patient require extra time? yes  Does the patient require contact guard assistance? yes  Does the patient require more than one staff member for assistance? yes        EATING  Pt requires setup. Pt wears dentures.   TUBE FEEDINGS:  Pt does not  receive nutrition through tube feedings.      Documentation reviewed and plan of care discussed/reviewed with   patient, physician, oncoming nurse and patient assistant during the shift.

## 2018-05-14 NOTE — PROGRESS NOTES
PHYSICAL THERAPY DAILY NOTE  Time In: 2258  Time Out: 1004  Patient Seen For: AM;Gait training; Therapeutic exercise;Transfer training; Other (see progress notes)    Subjective: Patient had no complaints. Objective: No pain noted. O2 sats remained >90% thru put treatment on 2 liters of O2. Other (comment) (fall precautions, NWB Left hand)  GROSS ASSESSMENT Daily Assessment            BED/MAT MOBILITY Daily Assessment    Supine to Sit : 0 (Not tested)  Sit to Supine : 0 (Not tested)       TRANSFERS Daily Assessment    Transfer Type: SPT without device  Transfer Assistance : 6 (Modified independent)  Sit to Stand Assistance: Supervision       GAIT Daily Assessment    Amount of Assistance: 6 (Modified independent)  Distance (ft): 60 Feet (ft)       STEPS or STAIRS Daily Assessment    Level of Assist : 5 (Stand-by assistance)  Rail Use: None       BALANCE Daily Assessment            WHEELCHAIR MOBILITY Daily Assessment            LOWER EXTREMITY EXERCISES Daily Assessment    Extremity: Both  Exercise Type #1: Seated lower extremity strengthening  Sets Performed: 1  Reps Performed: 20  Level of Assist: Supervision          Assessment: Patient making progress increasing endurance. Plan of Care: Continue with plan of care to reach PT goals. Returned to room with call leon at [facility].     Tammy Carrero, PTA  5/14/2018

## 2018-05-14 NOTE — PROGRESS NOTES
End Of Shift Functional Summary, Nursing      TOILETING:    Does patient need assist with adjusting pants up or down and/or pericare? yes  If yes, please indicate what the patient needs help with:    Pants up and down (i.e. pants up, pants down, pericare)  Pt uses walker. Does the patient require extra time? yes  Does the patient require standby assistance? yes  Does the patient require contact guard assistance? yes  Does the patient require more than one staff member for assistance? no    TOILET TRANSFER:    Pt requires minimal assistance. .  Does the patient require extra time? yes  Does the patient require contact guard assistance? yes  Does the patient require more than one staff member for assistance? no    BLADDER:    Pt does not have a medina catheter that staff manages. Pt does not take medication. If so, please indicate which medication:    Pt is continent. of bladder and voids in toilet  Pt requires staff to empty device    BOWEL:  Pt does take medication. Pt is continent of bowel and uses toilet. Pt requires staff to empty device     BED/CHAIR TRANSFER  Pt requires minimal assistance. Pt uses walker  Does the patient require extra time? yes  Does the patient require contact guard assistance? yes  Does the patient require more than one staff member for assistance? no    Documentation reviewed and plan of care discussed/reviewed with   patient during the shift.

## 2018-05-14 NOTE — PROGRESS NOTES
Problem: Falls - Risk of  Goal: *Absence of Falls  Document Tia Fall Risk and appropriate interventions in the flowsheet.    Outcome: Progressing Towards Goal  Fall Risk Interventions:  Mobility Interventions: Patient to call before getting OOB    Mentation Interventions: Adequate sleep, hydration, pain control    Medication Interventions: Evaluate medications/consider consulting pharmacy    Elimination Interventions: Call light in reach    History of Falls Interventions: Consult care management for discharge planning

## 2018-05-14 NOTE — PROGRESS NOTES
OT Daily Note    Time In 1110   Time Out 1155     Subjective:\"I had a dream that it was lunch time\" Agreeable to therapy. Pain:slowly getting better in RUE, but no GH ROM completed. Interdisciplinary Communication: Collaborated with Akin Cantrell and patient is making progress towards goals. Precautions: Other (comment) (fall precautions; NWB LUE)    Balance/functional mobility Daily Assessment   Ambulated 20' x 2 with SBA no device. R UE Daily Assessment   Added biofreeze start of session for pain relief on [address] joint and scapula. Also only complete light tissue work on scapula and [address] for pain relief with good results this session. Addition of some scapular depression all passively and slowly. Nadya Fried [facility] Daily Assessment   Large wooden pegs the size of a built up handle for eating used to complete with LUE for [facility], able to use thumb and all digits as needed now that new cast allows better mobilization,, also incorporated right hand to help manipulate pegs in left hand. Feeding Daily Assessment   Set up assist only, new cast on LUE allows for better wrist mobility allowing increased ability to feed. .            Ended session:In recliner, setup and eating lunch.      Avinash Bella OTR/L

## 2018-05-14 NOTE — PROGRESS NOTES
PHYSICAL THERAPY DAILY NOTE  Time In: 1346  Time Out: 6863  Patient Seen For: PM;Gait training; Therapeutic exercise;Transfer training; Other (see progress notes)    Subjective: Patient had no complaints. Objective: No pain noted. O2 at 2 liters and sats remained >90%. Other (comment) (fall precautions, NWB Left hand)  GROSS ASSESSMENT Daily Assessment            BED/MAT MOBILITY Daily Assessment    Supine to Sit : 0 (Not tested)  Sit to Supine : 0 (Not tested)       TRANSFERS Daily Assessment    Transfer Type: SPT without device  Transfer Assistance : 6 (Modified independent)  Sit to Stand Assistance: Modified independent       GAIT Daily Assessment    Amount of Assistance: 6 (Modified independent)  Distance (ft): 60 Feet (ft)       STEPS or STAIRS Daily Assessment    Level of Assist : 0 (Not tested)  Rail Use: None       BALANCE Daily Assessment            WHEELCHAIR MOBILITY Daily Assessment            LOWER EXTREMITY EXERCISES Daily Assessment    Extremity: Both  Exercise Type #1: Seated lower extremity strengthening  Sets Performed: 1  Reps Performed: 20  Level of Assist: Supervision          Assessment: Patient making good progress with mobility. Plan of Care: Continue with plan of care to reach PT goals. Returned to room with call leon at reach.     Angelika Sánchez, PTA  5/14/2018

## 2018-05-14 NOTE — PROGRESS NOTES
Warfarin dosing per pharmacist    Seda Phillipgerman Iqra Thibodeaux is a 77 y.o. female. Height: 5' 2\" (157.5 cm)    Weight: 101.2 kg (223 lb)    Indication:  Mechanical aortic valve replacement    Goal INR:  2-3    Home dose:  2.5 mg Mon, Fri; 5 mg all other days    Risk factors or significant drug interactions:  none    Other anticoagulants:  none    Daily Monitoring  Date  INR     Warfarin dose HGB              Notes  4/28  2.1          5 mg + 2.5 mg 7.4  4/29  2.4  5 mg  7  4/30  2.6  2.5 mg  8  5/1  2.9  5 mg  7.9  5/2  2.9  5 mg  8.5  Pharmacy consulted  5/3  2.7  5 mg  8.4  5/4  2.7  2.5 mg  ---   5/5  3  5 mg  ---   5/6  2.8  5 mg  8.1  5/7  2.8  2.5 mg  8.3  5/8  2.7  5 mg  9.2  5/9  2.5  5 mg  ---   5/10  ---  5 mg  9.5  5/11  2.5  2.5 mg  ---  5/12  ---  5 mg  ---  5/13  ---  5 mg  ---  5/14  2.6  2.5 mg  8.4    Pharmacy consulted to dose warfarin for Ms. Corral. She has been transferred to inpatient rehab following hospital stay for complications following a fall. She is followed by [facility] at the anti-coag clinic and was therapeutic on her home dose at her last visit on 4/19/2018. INR 2.6. Continue current dose. INR M,W,F. Pharmacy will continue to follow. Please call with any questions.     Thank you,  Prasanna Sandoval, PharmD  Clinical Pharmacist  [phone]

## 2018-05-14 NOTE — CONSULTS
[facility]        Inpatient Hematology / Oncology Consult Note    Reason for Consult:  Debillity   Physical debility  Referring Physician:  Mariella Sanderson*    History of Present Illness:  Ms. Anastacia Ruiz is a 77 y.o. female admitted on 5/2/2018 with a primary diagnosis of Diagnoses of Subdural hematoma ([address] Anticoagulated on Coumadin, Closed nondisplaced fracture of shaft of fifth metacarpal bone of left hand, initial encounter, Subconjunctival hemorrhage, left, Sprain of right knee, initial encounter, Cardiomyopathy, unspecified type ([address] CKD (chronic kidney disease) stage 3, GFR 30-59 ml/min, Chronic combined systolic and diastolic heart failure ([address] Essential hypertension, benign, Chronic obstructive pulmonary disease, unspecified COPD type ([address] REJI (obstructive sleep apnea)-cpap, Mixed hyperlipidemia, S/P AVR (aortic valve replacement), Pulmonary hypertension ([address] Type 2 diabetes mellitus with nephropathy ([address] Long term (current) use of anticoagulants, Closed nondisplaced fracture of shaft of fifth metacarpal bone of left hand, sequela, Iron deficiency anemia due to chronic blood loss, MDS (myelodysplastic syndrome) ([address] Coronary artery disease involving native coronary artery of native heart without angina pectoris, Diabetes mellitus type 2, diet-controlled ([address] ICD (implantable cardioverter-defibrillator) in place, Osteopenia, unspecified location, Osteoarthritis of shoulder, unspecified laterality, unspecified osteoarthritis type, and Dysthymia were pertinent to this visit. .      Ms. Anastacia Ruiz is a 78 yo known patient of Dr. Bishnu Dorantes with a tentative diagnosis of MDS/anemia. She has a PMH of hypertension, CHF, CKD3. She is on coumadin for aortic valve replacement. Patient is now on 9th floor rehab following a fall at home that left her with fractures in both hands. We are consulted for MDS/anemia not responding to EPO.        Review of Systems:  Constitutional Denies fever, chills, weight loss, appetite changes, fatigue,    HEENT Denies trauma, blurry vision, hearing loss, ear pain, nosebleeds, sore throat, neck pain   Skin Denies lesions or rashes. Lungs Denies dyspnea, cough, sputum production or hemoptysis. Cardiovascular Denies chest pain, palpitations, or lower extremity edema. Neuro Denies headaches, visual changes or ataxia. Denies dizziness, tingling, tremors, sensory change, speech change, focal weakness or headaches. MSK Denies back pain, arthralgias, myalgias     Psychiatric/Behavioral The patient is not nervous/anxious. Allergies   Allergen Reactions    Sulfa (Sulfonamide Antibiotics) Hives    Aspirin Nausea Only     Cannot take uncoated aspirin     Past Medical History:   Diagnosis Date    Anticoagulated on Coumadin 7/9/2013    S/P AVR     Benign hypertension     controlled    CAD (coronary artery disease) 1/20/2013 5/8/14 PCI LAD with stent placed     Cardiomyopathy ([facility]     CHF (congestive heart failure) ([facility] 2/17/2017    Chronic left-sided low back pain without sciatica 6/15/2016    Chronic respiratory failure ([address] 4/2/2014    CKD (chronic kidney disease) stage 3, GFR 30-59 ml/min 7/10/2013    COPD (chronic obstructive pulmonary disease) ([address] 4/2/2014    Essential hypertension, benign 1/20/2013    HLD (hyperlipidemia)     ICD (implantable cardioverter-defibrillator) in place 10/2/2014    Biotronik single-chamber ICD implantation 10/20/14     Iron deficiency anemia due to chronic blood loss 7/29/2009    MDS (myelodysplastic syndrome) ([address] 12/17/2011    Procrit started in August, 2011 12/18/11 Procrit weekly and Iron stores. 5-12 12-13-12 good response to 3 weekly procrit did not need it last time  3-7-13 Pt doing well. Just wanted a \"check-up. \" Responding to Procrit every three weeks.  8-29-13 patient has missed some injections on recently restarted hemoglobin only issue , takes oral iron iron stores each time  REJI (obstructive sleep apnea)-cpap 2014    Osteoarthritis     Osteopenia     Quadrantanopia     Secondary pulmonary hypertension     Tachycardia     Rapid H/B     Visual complaint 2015     Past Surgical History:   Procedure Laterality Date    CARDIAC CATHETERIZATION      HX AORTIC VALVE REPLACEMENT  ,     mechanical valve     HX  SECTION      HX CORONARY STENT PLACEMENT  May, 2014    STEMI with Stent placement.  HX ENDOSCOPY  2009    EGD    HX HEART CATHETERIZATION      HX PACEMAKER  10/2/2014    Biotronik ICD    HX TUBAL LIGATION      IR BX BONE MARROW DIAGNOSTIC  2011     Family History   Problem Relation Age of Onset    Heart Disease Mother      CHF    Hypertension Mother     Kidney Disease Mother     Heart Disease Father      CHF    Lung Disease Father     Diabetes Father     Cancer Father      prostate    Hypertension Father     Heart Attack Father     Heart Disease Maternal Aunt     Diabetes Brother     Coronary Artery Disease Other     Breast Cancer Neg Hx      Social History     Social History    Marital status:      Spouse name: N/A    Number of children: N/A    Years of education: N/A     Occupational History     Retired     deli     Social History Main Topics    Smoking status: Former Smoker     Packs/day: 0.25     Years: 42.00     Types: Cigarettes     Start date: 10/1/1956     Quit date: 2014    Smokeless tobacco: Never Used    Alcohol use No    Drug use: No    Sexual activity: Not on file     Other Topics Concern    Weight Concern Yes    Special Diet Yes     Social History Narrative    Lives with her daughter. Ambulates only short distances.      Current Facility-Administered Medications   Medication Dose Route Frequency Provider Last Rate Last Dose    [START ON 5/15/2018] furosemide (LASIX) tablet 60 mg  60 mg Oral DAILY Felisha Malin MD        0.45% sodium chloride infusion  100 mL/hr IntraVENous CONTINUOUS Felisha Adam MD        [START ON 5/15/2018] epoetin jairo (EPOGEN;PROCRIT) injection 40,000 Units  40,000 Units SubCUTAneous Q7D Cortney Glynn NP        albuterol (PROVENTIL VENTOLIN) nebulizer solution 2.5 mg  2.5 mg Nebulization BID RT Clara Francisco MD   2.5 mg at 05/14/18 8278    And    budesonide (PULMICORT) 500 mcg/2 ml nebulizer suspension  500 mcg Nebulization BID RT Clara Francisco MD   500 mcg at 05/14/18 1596    0.9% sodium chloride infusion 250 mL  250 mL IntraVENous PRN Felisha Adam MD        diphenhydrAMINE (BENADRYL) capsule 25 mg  25 mg Oral Q6H PRN Felisha Adam MD        ferrous sulfate tablet 325 mg  1 Tab Oral BID WITH MEALS Felisha Adam MD   325 mg at 05/14/18 0850    acetaminophen (TYLENOL) tablet 650 mg  650 mg Oral Q4H PRN Felisha Green MD   650 mg at 05/03/18 1725    bisacodyl (DULCOLAX) tablet 5 mg  5 mg Oral DAILY PRN Felisha Green MD   5 mg at 05/11/18 2021    LORazepam (ATIVAN) tablet 1 mg  1 mg Oral BID PRN Felisha Green MD   1 mg at 05/09/18 2133    naloxone [facility]) injection 0.4 mg  0.4 mg IntraVENous PRN Felisha Adam MD        prochlorperazine (COMPAZINE) injection 5 mg  5 mg IntraVENous Q8H PRN Felisha Green MD        sodium chloride (NS) flush 5-10 mL  5-10 mL IntraVENous PRN Felisha Adam MD   10 mL at 05/02/18 1727    albuterol (PROVENTIL VENTOLIN) nebulizer solution 2.5 mg  2.5 mg Nebulization Q4H PRN Felisha Adam MD        bisacodyl (DULCOLAX) suppository 10 mg  10 mg Rectal DAILY PRN Felisha Green MD        carvedilol (COREG) tablet 3.125 mg  3.125 mg Oral BID WITH MEALS Felisha Adam MD   Stopped at 05/14/18 0849    escitalopram oxalate (LEXAPRO) tablet 10 mg  10 mg Oral DAILY Felisha Adam MD   10 mg at 05/14/18 0850    loratadine (CLARITIN) tablet 10 mg 10 mg Oral DAILY PRN Felisha Mcdowell MD        oxyCODONE IR (ROXICODONE) tablet 10 mg  10 mg Oral Q4H PRN Felisha Mcdowell MD   10 mg at 18 2213    polyethylene glycol (MIRALAX) packet 17 g  17 g Oral DAILY Felisha Mcdowell MD   17 g at 18 0847    sacubitril-valsartan (ENTRESTO) 24-26 mg tablet 1 Tab  1 Tab Oral BID Felisha Mcdowell MD   Stopped at 18 0848    senna (SENOKOT) tablet 8.6 mg  1 Tab Oral DAILY Felisha Mcdowell MD   8.6 mg at 18 0851    simvastatin (ZOCOR) tablet 20 mg  20 mg Oral QHS Felisha Mcdowell MD   20 mg at 18 2044    tiotropium [facility]) inhalation capsule 18 mcg  1 Cap Inhalation DAILY Felisha Mcdowell MD   18 mcg at 18 0508    traMADol (ULTRAM) tablet 50 mg  50 mg Oral Q6H PRN Felisha La MD   50 mg at 18 6385    warfarin (COUMADIN) tablet 2.5 mg  2.5 mg Oral Once per day on  Felisha La MD   2.5 mg at 18 1738    warfarin (COUMADIN) tablet 5 mg  5 mg Oral Once per day on Sun Tue Wed Thu Sat Felisha Mcdowell MD   5 mg at 18 1809       OBJECTIVE:  Patient Vitals for the past 8 hrs:   BP Temp Pulse Resp SpO2   18 0731 93/46 98 °F (36.7 °C) 62 18 98 %   18 0508 - - - - 95 %     Temp (24hrs), Av.9 °F (36.6 °C), Min:97.5 °F (36.4 °C), Max:98.3 °F (36.8 °C)     07 - 1900  In: 240 [P.O.:240]  Out: -     Physical Exam:  Constitutional: Well developed, well nourished female in no acute distress, sitting comfortably in the hospital bed. HEENT: Normocephalic and atraumatic. Oropharynx is clear, mucous membranes are moist.  Pupils are equal, round, and reactive to light. Extraocular muscles are intact. Sclerae anicteric. Neck supple without JVD. No thyromegaly present. Lymph node   No palpable submandibular, cervical, supraclavicular, axillary or inguinal lymph nodes. Skin Warm and dry.   No bruising and no rash noted. No erythema. No pallor. Respiratory Lungs are clear to auscultation bilaterally without wheezes, rales or rhonchi, normal air exchange without accessory muscle use. CVS Normal rate, regular rhythm and normal S1 and S2. No murmurs, gallops, or rubs. Abdomen Soft, nontender and nondistended, normoactive bowel sounds. No palpable mass. No hepatosplenomegaly. Neuro Grossly nonfocal with no obvious sensory or motor deficits. MSK Normal range of motion in general.  No edema and no tenderness. Psych Appropriate mood and affect.         Labs:    Recent Results (from the past 24 hour(s))   PROTHROMBIN TIME + INR    Collection Time: 05/14/18  6:27 AM   Result Value Ref Range    Prothrombin time 27.2 (H) 11.5 - 14.5 sec    INR 2.6     METABOLIC PANEL, BASIC    Collection Time: 05/14/18  6:27 AM   Result Value Ref Range    Sodium 141 136 - 145 mmol/L    Potassium 4.6 3.5 - 5.1 mmol/L    Chloride 101 98 - 107 mmol/L    CO2 33 (H) 21 - 32 mmol/L    Anion gap 7 7 - 16 mmol/L    Glucose 111 (H) 65 - 100 mg/dL    BUN 59 (H) 8 - 23 MG/DL    Creatinine 1.85 (H) 0.6 - 1.0 MG/DL    GFR est AA 35 (L) >60 ml/min/1.73m2    GFR est non-AA 29 (L) >60 ml/min/1.73m2    Calcium 8.6 8.3 - 10.4 MG/DL   HGB & HCT    Collection Time: 05/14/18  6:27 AM   Result Value Ref Range    HGB 8.4 (L) 11.7 - 15.4 g/dL    HCT 27.3 (L) 35.8 - 46.3 %         ASSESSMENT:  Problem List  Date Reviewed: 4/27/2018          Codes Class Noted    * (Principal)Physical debility ICD-10-CM: R53.81  ICD-9-CM: 799.3  5/2/2018        Closed nondisplaced fracture of shaft of fifth metacarpal bone of left hand ICD-10-CM: S62.357A  ICD-9-CM: 815.03  4/28/2018        Subdural hematoma (HCC) ICD-10-CM: I62.00  ICD-9-CM: 432.1  4/27/2018        Long term (current) use of anticoagulants (Chronic) ICD-10-CM: Z79.01  ICD-9-CM: V58.61  4/19/2018        Dysthymia ICD-10-CM: F34.1  ICD-9-CM: 300.4  4/5/2018        Type 2 diabetes mellitus with nephropathy ([facility] (Chronic) ICD-10-CM: E11.21  ICD-9-CM: 250.40, 583.81  12/18/2017        Pulmonary hypertension (HCC) (Chronic) ICD-10-CM: I27.20  ICD-9-CM: 416.8  6/15/2016        S/P AVR (aortic valve replacement) (Chronic) ICD-10-CM: Z95.2  ICD-9-CM: V43.3  Unknown    Overview Signed 2/23/2016 11:04 AM by Micky Dejesus     Mechanical/Artific             Cardiomyopathy [facility]) (Chronic) ICD-10-CM: I42.9  ICD-9-CM: 425.4  Unknown        Osteopenia ICD-10-CM: M85.80  ICD-9-CM: 733.90  Unknown        HLD (hyperlipidemia) (Chronic) ICD-10-CM: E78.5  ICD-9-CM: 272.4  Unknown        Osteoarthritis ICD-10-CM: M19.90  ICD-9-CM: 715.90  Unknown        ICD (implantable cardioverter-defibrillator) in place ICD-10-CM: Z95.810  ICD-9-CM: V45.02  10/2/2014    Overview Signed 10/2/2014  4:58 PM by Navid Anne III     Biotronik single-chamber ICD implantation 10/20/14             Diabetes mellitus type 2, diet-controlled ([facility] (Chronic) ICD-10-CM: E11.9  ICD-9-CM: 250.00  8/28/2014        COPD (chronic obstructive pulmonary disease) ([facility] (Chronic) ICD-10-CM: J44.9  ICD-9-CM: 468  4/2/2014        REJI (obstructive sleep apnea)-cpap (Chronic) ICD-10-CM: G47.33  ICD-9-CM: 327.23  4/2/2014        CKD (chronic kidney disease) stage 3, GFR 30-59 ml/min (Chronic) ICD-10-CM: N18.3  ICD-9-CM: 585.3  7/10/2013        Anticoagulated on Coumadin (Chronic) ICD-10-CM: Z51.81, Z79.01  ICD-9-CM: V58.83, V58.61  7/9/2013    Overview Signed 7/9/2013  4:16 PM by Eve Aly NP     S/P AVR             CAD (coronary artery disease) (Chronic) ICD-10-CM: I25.10  ICD-9-CM: 414.00  1/20/2013    Overview Signed 5/20/2014 10:05 AM by Anamaria Gibbs NP     5/8/14 PCI LAD with stent placed             Chronic combined systolic and diastolic heart failure ([facility] (Chronic) ICD-10-CM: I50.42  ICD-9-CM: 428.42  1/20/2013    Overview Addendum 4/28/2018  4:53 AM by Benjamín Allen MD     5/8/14 ECHO:  EF 10-15%  12/2017:  EF 25-30% Essential hypertension, benign (Chronic) ICD-10-CM: I10  ICD-9-CM: 401.1  1/20/2013        MDS (myelodysplastic syndrome) (HCC) (Chronic) ICD-10-CM: D46.9  ICD-9-CM: 238.75  12/17/2011    Overview Addendum 8/29/2013 11:06 AM by Daron Newton started in August, 2011 12/18/11 Procrit weekly and Iron stores. 5-12 12-13-12 good response to 3 weekly procrit did not need it last time    3-7-13 Pt doing well. Just wanted a \"check-up. \" Responding to Procrit every three weeks. 8-29-13 patient has missed some injections on recently restarted hemoglobin only issue , takes oral iron iron stores each time                Iron deficiency anemia due to chronic blood loss (Chronic) ICD-10-CM: D50.0  ICD-9-CM: 280.0  7/29/2009                RECOMMENDATIONS:  MDS/anemia  5/14 Hgb stable at 8.4. Recommend 40,000u EPO weekly while here. Lab studies and imaging studies were personally reviewed. Pertinent old records were reviewed. Thank you for allowing us to participate in the care of Ms. Corral. Patient has follow up appointment scheduled this month with Dr. Gerardo Summers. If she remains inpatient, her appointment will need to be rescheduled. We will sign off and be available as needed.           Caitlin Zhu NP   [place] [facility]  [address] [address]  Office : [phone]  Fax : [phone]

## 2018-05-14 NOTE — PROGRESS NOTES
Esteban Herron MD,   Medical Director  [address] [facility], [address] [facility]  Tel: [phone]       [facility] PROGRESS NOTE    Margy Macario  Admit Date: 5/2/2018  Admit Diagnosis: Debillity ; Physical debility    Subjective     Tearful this a.m. \"I am falling apart. I want to be better. I dont want to backslide. \" upset about hgb dropping, recd Epogen yesterday. Denies cp, sob, n.  Slept well. + PRUETT    Objective:     Current Facility-Administered Medications   Medication Dose Route Frequency    albuterol (PROVENTIL VENTOLIN) nebulizer solution 2.5 mg  2.5 mg Nebulization BID RT    And    budesonide (PULMICORT) 500 mcg/2 ml nebulizer suspension  500 mcg Nebulization BID RT    furosemide (LASIX) tablet 80 mg  80 mg Oral DAILY    0.9% sodium chloride infusion 250 mL  250 mL IntraVENous PRN    diphenhydrAMINE (BENADRYL) capsule 25 mg  25 mg Oral Q6H PRN    epoetin jairo (EPOGEN;PROCRIT) injection 4,000 Units  4,000 Units SubCUTAneous Q7D    ferrous sulfate tablet 325 mg  1 Tab Oral BID WITH MEALS    acetaminophen (TYLENOL) tablet 650 mg  650 mg Oral Q4H PRN    bisacodyl (DULCOLAX) tablet 5 mg  5 mg Oral DAILY PRN    LORazepam (ATIVAN) tablet 1 mg  1 mg Oral BID PRN    naloxone (NARCAN) injection 0.4 mg  0.4 mg IntraVENous PRN    prochlorperazine (COMPAZINE) injection 5 mg  5 mg IntraVENous Q8H PRN    sodium chloride (NS) flush 5-10 mL  5-10 mL IntraVENous PRN    albuterol (PROVENTIL VENTOLIN) nebulizer solution 2.5 mg  2.5 mg Nebulization Q4H PRN    bisacodyl (DULCOLAX) suppository 10 mg  10 mg Rectal DAILY PRN    carvedilol (COREG) tablet 3.125 mg  3.125 mg Oral BID WITH MEALS    escitalopram oxalate (LEXAPRO) tablet 10 mg  10 mg Oral DAILY    loratadine (CLARITIN) tablet 10 mg  10 mg Oral DAILY PRN    oxyCODONE IR (ROXICODONE) tablet 10 mg  10 mg Oral Q4H PRN    polyethylene glycol (MIRALAX) packet 17 g  17 g Oral DAILY    sacubitril-valsartan (ENTRESTO) 24-26 mg tablet 1 Tab  1 Tab Oral BID    senna (SENOKOT) tablet 8.6 mg  1 Tab Oral DAILY    simvastatin (ZOCOR) tablet 20 mg  20 mg Oral QHS    tiotropium (SPIRIVA) inhalation capsule 18 mcg  1 Cap Inhalation DAILY    traMADol (ULTRAM) tablet 50 mg  50 mg Oral Q6H PRN    warfarin (COUMADIN) tablet 2.5 mg  2.5 mg Oral Once per day on Mon Fri    warfarin (COUMADIN) tablet 5 mg  5 mg Oral Once per day on Sun Tue Wed Thu Sat     Review of Systems:Denies chest pain, shortness of breath, cough, headache, visual problems, abdominal pain, dysurea, calf pain. Pertinent positives are as noted in the medical records and unremarkable otherwise. Visit Vitals    BP 93/46    Pulse 62    Temp 98 °F (36.7 °C)    Resp 18    Ht 5' 2\" (1.575 m)    Wt 223 lb (101.2 kg)    SpO2 98%    Breastfeeding No    BMI 40.79 kg/m2        Physical Exam:   General: Alert and age appropriately oriented. tearful  No acute cardio respiratory distress. HEENT: Normocephalic,no scleral icterus; conjunctival hemmor on the left improving  Oral mucosa moist without cyanosis   Lungs: Clear to auscultation  Bilaterally. Decreased   Respiration even and unlabored   Heart: Regular rate and rhythm, S1, S2   No  murmurs, clicks, rub or gallops   Abdomen: Soft, non-tender, nondistended. Bowel sounds present. No organomegaly. Genitourinary: Benign . Neuromuscular:      Grossly no focal motor deficits noted. Moves ankles. Chronic right shoulder weakness   Skin/extremity: No rashes, no erythema.  No calf tenderness BLE  Trace edema; wrist cast on left, rachel taped 3rd and 4th dig                                                                            Functional Assessment:          Balance  Sitting - Static: Good (unsupported) (05/12/18 1200)  Sitting - Dynamic: Good (unsupported) (05/12/18 1200)  Standing - Static: Good (05/12/18 1200)  Standing - Dynamic : Impaired (05/12/18 1200)       Feeding  Adaptive Equipment: Built up spoon;Non-skid surface;Plate guard (07/56/49 1045)             Franklin Khan Fall Risk Assessment:  Franklin Khan Fall Risk  Mobility: Ambulates or transfers with assist devices or assistance (05/14/18 30)  Mobility Interventions: Patient to call before getting OOB (05/13/18 1914)  Mentation: Alert, oriented x 3 (05/13/18 1914)  Mentation Interventions: Adequate sleep, hydration, pain control (05/13/18 1914)  Medication: Patient receiving anticonvulsants, sedatives(tranquilizers), psychotropics or hypnotics, hypoglycemics, narcotics, sleep aids, antihypertensives, laxatives, or diuretics (05/13/18 1914)  Medication Interventions: Evaluate medications/consider consulting pharmacy (05/13/18 1914)  Elimination: Needs assistance with toileting (05/13/18 1914)  Elimination Interventions: Call light in reach (05/13/18 1914)  Prior Fall History: Before admission in past 12 months _home or previous inpatient care) (05/13/18 1914)  History of Falls Interventions: Consult care management for discharge planning (05/13/18 1914)  Total Score: 4 (05/13/18 1914)  Standard Fall Precautions: Yes (05/03/18 2239)  High Fall Risk: Yes (05/13/18 1914)     Speech Assessment:         Ambulation:  Gait  Distance (ft): 60 Feet (ft) (60ft x 2 with 5 minute rest break) (05/12/18 1200)  Assistive Device:  (No device) (05/12/18 1200)  Rail Use: None (05/11/18 1100)     Labs/Studies:  Recent Results (from the past 72 hour(s))   PROTHROMBIN TIME + INR    Collection Time: 05/14/18  6:27 AM   Result Value Ref Range    Prothrombin time 27.2 (H) 11.5 - 14.5 sec    INR 2.6     METABOLIC PANEL, BASIC    Collection Time: 05/14/18  6:27 AM   Result Value Ref Range    Sodium 141 136 - 145 mmol/L    Potassium 4.6 3.5 - 5.1 mmol/L    Chloride 101 98 - 107 mmol/L    CO2 33 (H) 21 - 32 mmol/L    Anion gap 7 7 - 16 mmol/L    Glucose 111 (H) 65 - 100 mg/dL    BUN 59 (H) 8 - 23 MG/DL    Creatinine 1.85 (H) 0.6 - 1.0 MG/DL    GFR est AA 35 (L) >60 ml/min/1.73m2    GFR est non-AA 29 (L) >60 ml/min/1.73m2    Calcium 8.6 8.3 - 10.4 MG/DL   HGB & HCT    Collection Time: 05/14/18  6:27 AM   Result Value Ref Range    HGB 8.4 (L) 11.7 - 15.4 g/dL    HCT 27.3 (L) 35.8 - 46.3 %       Assessment:     Problem List as of 5/14/2018  Date Reviewed: 4/27/2018          Codes Class Noted - Resolved    * (Principal)Physical debility ICD-10-CM: R53.81  ICD-9-CM: 799.3  5/2/2018 - Present        Closed nondisplaced fracture of shaft of fifth metacarpal bone of left hand ICD-10-CM: S62.357A  ICD-9-CM: 815.03  4/28/2018 - Present        Subdural hematoma ([facility] [address] ICD-10-CM: I62.00  ICD-9-CM: 432.1  4/27/2018 - Present        Long term (current) use of anticoagulants (Chronic) ICD-10-CM: Z79.01  ICD-9-CM: V58.61  4/19/2018 - Present        Dysthymia ICD-10-CM: F34.1  ICD-9-CM: 300.4  4/5/2018 - Present        Type 2 diabetes mellitus with nephropathy (HCC) (Chronic) ICD-10-CM: E11.21  ICD-9-CM: 250.40, 583.81  12/18/2017 - Present        Pulmonary hypertension (HCC) (Chronic) ICD-10-CM: I27.20  ICD-9-CM: 416.8  6/15/2016 - Present        S/P AVR (aortic valve replacement) (Chronic) ICD-10-CM: Z95.2  ICD-9-CM: V43.3  Unknown - Present    Overview Signed 2/23/2016 11:04 AM by Peg Agudelo     Mechanical/Artific             Cardiomyopathy ([facility] [address] (Chronic) ICD-10-CM: I42.9  ICD-9-CM: 425.4  Unknown - Present        Osteopenia ICD-10-CM: M85.80  ICD-9-CM: 733.90  Unknown - Present        HLD (hyperlipidemia) (Chronic) ICD-10-CM: E78.5  ICD-9-CM: 272.4  Unknown - Present        Osteoarthritis ICD-10-CM: M19.90  ICD-9-CM: 715.90  Unknown - Present        ICD (implantable cardioverter-defibrillator) in place ICD-10-CM: Z95.810  ICD-9-CM: V45.02  10/2/2014 - Present    Overview Signed 10/2/2014  4:58 PM by Mila Munguia single-chamber ICD implantation 10/20/14             Diabetes mellitus type 2, diet-controlled ([facility] [address] (Chronic) ICD-10-CM: E11.9  ICD-9-CM: 250.00 8/28/2014 - Present        COPD (chronic obstructive pulmonary disease) (HCC) (Chronic) ICD-10-CM: J44.9  ICD-9-CM: 496  4/2/2014 - Present        REJI (obstructive sleep apnea)-cpap (Chronic) ICD-10-CM: P07.39  ICD-9-CM: 327.23  4/2/2014 - Present        CKD (chronic kidney disease) stage 3, GFR 30-59 ml/min (Chronic) ICD-10-CM: N18.3  ICD-9-CM: 585.3  7/10/2013 - Present        Anticoagulated on Coumadin (Chronic) ICD-10-CM: Z51.81, Z79.01  ICD-9-CM: V58.83, V58.61  7/9/2013 - Present    Overview Signed 7/9/2013  4:16 PM by Barrington Aranda NP     S/P AVR             CAD (coronary artery disease) (Chronic) ICD-10-CM: I25.10  ICD-9-CM: 414.00  1/20/2013 - Present    Overview Signed 5/20/2014 10:05 AM by Matt Jenkins NP     5/8/14 PCI LAD with stent placed             Chronic combined systolic and diastolic heart failure (HCC) (Chronic) ICD-10-CM: I50.42  ICD-9-CM: 428.42  1/20/2013 - Present    Overview Addendum 4/28/2018  4:53 AM by Robert Dominguez MD     5/8/14 ECHO:  EF 10-15%  12/2017:  EF 25-30%             Essential hypertension, benign (Chronic) ICD-10-CM: I10  ICD-9-CM: 401.1  1/20/2013 - Present        MDS (myelodysplastic syndrome) (HCC) (Chronic) ICD-10-CM: D46.9  ICD-9-CM: 238.75  12/17/2011 - Present    Overview Addendum 8/29/2013 11:06 AM by Paulino Ojeda started in August, 2011 12/18/11 Procrit weekly and Iron stores. 5-12 12-13-12 good response to 3 weekly procrit did not need it last time    3-7-13 Pt doing well. Just wanted a \"check-up. \" Responding to Procrit every three weeks.   8-29-13 patient has missed some injections on recently restarted hemoglobin only issue , takes oral iron iron stores each time                Iron deficiency anemia due to chronic blood loss (Chronic) ICD-10-CM: D50.0  ICD-9-CM: 280.0  7/29/2009 - Present        RESOLVED: CHF (congestive heart failure) ([facility] ICD-10-CM: I50.9  ICD-9-CM: 428.0  2/17/2017 - 4/27/2018        RESOLVED: Routine general medical examination at a health care facility ICD-10-CM: Z00.00  ICD-9-CM: V70.0  12/16/2016 - 4/27/2018        RESOLVED: Chest pain, unspecified ICD-10-CM: R07.9  ICD-9-CM: 786.50  12/7/2016 - 12/16/2016        RESOLVED: Chronic left-sided low back pain without sciatica ICD-10-CM: M54.5, G89.29  ICD-9-CM: 724.2, 338.29  6/15/2016 - 4/27/2018        RESOLVED: Tachycardia ICD-10-CM: R00.0  ICD-9-CM: 785.0  Unknown - 12/16/2016    Overview Signed 2/23/2016 11:02 AM by Peg Marc H/B              RESOLVED: Chronic respiratory failure (HCC) (Chronic) ICD-10-CM: J96.10  ICD-9-CM: 518.83  4/2/2014 - 4/27/2018              Plan:   S/p fall with bilateral hand fxs, right knee sprain, FELIZ on CKD, anemia with underlying dx of MDS; physical debility       Continue daily physician medical management:  Pneumonia prophylaxis- Incentive spirometer every hour while awake      Digit fxs both hands; cont splint/rachel tape; only restriction is no lifting; has chronic RUE weakness from what I suspect is a RTC injury; xray neg; OT working on modalities  -5/10 dec edema, increase use of fingers  5/11 dc splint; cont rachel taping 3rd and 4th digits right hand and 4th /5th digits on left hand. No lifting but can use hands for ADLs as tolerated  -5/14 now with wrist/mc cast on left; neuro intact mary. Using fingers more despite immobilized digits.       COPD/REJI; pt is on Tr[facility] at [facility]. She manages this at home but having difficulty do to fxs in her hands  -cont Proventil , Pulmicort; Spiriva;  try weaning daytime O2; currently 4-6L due to hypoxia. Doesn't use during day at home per pt; 5/3 NOW reports chronic 3L at home, thus at baseline  -5/3 down from 4 to 2L , sats 98%; 5/4 on 3L  sats 96% (obviously needs due to drop noted to 65% sats when O2 accidentally not hooked up last noc and pt symptomatic with chest pain which resolved immediately when placed back on O2  -5/5 comfortable.  No PRUETT, on home 3L NC, lowest sat 91%; 5/10 86% after doing 3 stairs  - 5/13 albuterol nebs tapered to bid      Severe CHF/cardiomyopathy; compensated currently; cont Coreg, Lasix; has an ICD ; on entresto as well, 5/3  -5/3 no evidence of acute issues; 5/4 compensated well and considering dec Lasix  -5/5 recheck renal fx, may dec lasix  -5/8 dec Lasix due to FELIZ on CKD; compensated; holding entresto  -5/10 restarted Entresto and have decreased Lasix to home dose of 80mg once daily      DVT risk / DVT Prophylaxis- Will require daily physician exam to assess for signs and symptoms as patient is at increased risk for of thromboembolism. Mobilization as tolerated. Intermittent pneumatic compression devices when in bed Thigh-high or knee-high thromboembolic deterrent hose when out of bed. On therapeutic Coumadin       Pain Control: ongoing significant pain in back, shoulders, knees and hands which is stable and controlled by PRN meds. Will require regular pain assessment and comprenhensive pain management,   -has end stage OA of the knees and now with right knee sprain; ice/ace wrap, modalities      Wound Care: Monitor wound status daily per staff and physician. At risk for failure. Will require 24/7 rehab nursing. None needed but at risk due to immobility   -5/3 hematoma dorsum right hand; warm compress      Hypertension - BP uncontrolled, fluctuating, managed medically. Has hypotension, asymptomatic, parameters with meds   -5/3 119-141 SBP, controlled; 5/4 /66 -; on Coreg, Lasix and Entresto; CHF well compensated; consider decreasing lasix which is dosed at 80mg bid  5/5 bp 141/60; 5/6 117/78 ; 5/9 elevated today 156/83 ; 5/10 improved  HR and BP acceptable  -5/14 hypotensive; asymptomatic; hold coreg and entresto.  Dec lasix; likely due to dehydration and anemia      Scleral hemorrhage; clinically unchanged but less periocular swelling  -5/14 improving, no complications      MDS/chronic anemia; monitor , replace as needed, consider epogen  -5/3 hgb stable at 8.4; 5/4 consider dose of epogen but I believe there are restrictions to ordering and may need hematology, will increase iron due to deficiency; recheck 5/6; 8.1  -consult hematology Mond if labs concerning; looking back at past notes; was on weekly procrit if hgb < 10; will order  -transf 1 unit 5/7 ; hbg 9.2 from 8.3; cont epogen weekly. F/u with hematology  -5/14 hgb 8.4; reced epogen yesterday; consult Hematology; followed by Dr Christianne PIPER on CKD; improving, monitor labs and adjust meds; daily wt due to CHF  -creat slowly improving 5/3; 1.53 from 1.71 yesterday, BUN remains unchanged, 50's; monitor labs 3x/wk  -5/7 creat up; push fluids  -5/8 start 1/4 NS at 100 x 2 bags, creat 1.99 with bun 70s; dec Lasix and hold entresto  -5/9 Creat 1.65, bun 65; cont IVFs x 2 bags and hold entresto and cont dec dose Lasix. -5/10 creat 1.46; stopped IVFs , bun 58; recheck 5/14; bun and creat up; 1.85; IVF x 24hrs; usually responds well to this. Dec lasix      Chronic coumadin therapy due to mech AVR; goal INR 2-3; inr 2.9; monitor and have pharmacy assist in dosing;   5/4 INR therap at 2.7 on home alternating doses of coumadin (2.5mg M/F and 5mg S/Tue/W/TH/SAT)  -5/9 remains therapeutic on coumadin per Pharmacy dosing; appreciate  -5/11 INR therapeutic at 2.5 on home dosing as noted above  No labs over the weekend, PT/INR tomorrow  ; INR 2.6 TODAY 5/14      ? Hx of DM listed but on no medications ; do have on diet restrictions; carbohydrate restrictions      Urinary retention/ neurogenic bladder - schedule voids q6-8 hrs. Check post-void residual as needed; In and Out catheterize if post-void residual is more than 400 cc.  -5/4 no issues      bowel program - constipation, added bowel program; 5/4 continent; no const/diarr      GERD - resume PPI.  At times may need additional antacids, Maalox prn.           Time spent was 25 minutes with over 1/2 in direct patient care/examination, consultation and coordination of care.      Signed By: Tomas Pierre MD     May 14, 2018

## 2018-05-15 LAB
ANION GAP SERPL CALC-SCNC: 8 MMOL/L (ref 7–16)
BUN SERPL-MCNC: 50 MG/DL (ref 8–23)
CALCIUM SERPL-MCNC: 8.9 MG/DL (ref 8.3–10.4)
CHLORIDE SERPL-SCNC: 106 MMOL/L (ref 98–107)
CO2 SERPL-SCNC: 29 MMOL/L (ref 21–32)
CREAT SERPL-MCNC: 1.55 MG/DL (ref 0.6–1)
GLUCOSE SERPL-MCNC: 86 MG/DL (ref 65–100)
HCT VFR BLD AUTO: 26.5 % (ref 35.8–46.3)
HEMOCCULT STL QL: NEGATIVE
HGB BLD-MCNC: 8.4 G/DL (ref 11.7–15.4)
POTASSIUM SERPL-SCNC: 5 MMOL/L (ref 3.5–5.1)
SODIUM SERPL-SCNC: 143 MMOL/L (ref 136–145)

## 2018-05-15 PROCEDURE — 77010033678 HC OXYGEN DAILY

## 2018-05-15 PROCEDURE — 97530 THERAPEUTIC ACTIVITIES: CPT

## 2018-05-15 PROCEDURE — 94660 CPAP INITIATION&MGMT: CPT

## 2018-05-15 PROCEDURE — 74011250636 HC RX REV CODE- 250/636: Performed by: NURSE PRACTITIONER

## 2018-05-15 PROCEDURE — 74011000258 HC RX REV CODE- 258: Performed by: PHYSICAL MEDICINE & REHABILITATION

## 2018-05-15 PROCEDURE — 65310000000 HC RM PRIVATE REHAB

## 2018-05-15 PROCEDURE — 74011000250 HC RX REV CODE- 250: Performed by: PHYSICAL MEDICINE & REHABILITATION

## 2018-05-15 PROCEDURE — 36415 COLL VENOUS BLD VENIPUNCTURE: CPT | Performed by: PHYSICAL MEDICINE & REHABILITATION

## 2018-05-15 PROCEDURE — 97110 THERAPEUTIC EXERCISES: CPT

## 2018-05-15 PROCEDURE — 85018 HEMOGLOBIN: CPT | Performed by: PHYSICAL MEDICINE & REHABILITATION

## 2018-05-15 PROCEDURE — 74011250637 HC RX REV CODE- 250/637: Performed by: PHYSICAL MEDICINE & REHABILITATION

## 2018-05-15 PROCEDURE — 80048 BASIC METABOLIC PNL TOTAL CA: CPT | Performed by: PHYSICAL MEDICINE & REHABILITATION

## 2018-05-15 PROCEDURE — 97535 SELF CARE MNGMENT TRAINING: CPT

## 2018-05-15 PROCEDURE — 99232 SBSQ HOSP IP/OBS MODERATE 35: CPT | Performed by: PHYSICAL MEDICINE & REHABILITATION

## 2018-05-15 PROCEDURE — 94640 AIRWAY INHALATION TREATMENT: CPT

## 2018-05-15 PROCEDURE — 97116 GAIT TRAINING THERAPY: CPT

## 2018-05-15 PROCEDURE — 94760 N-INVAS EAR/PLS OXIMETRY 1: CPT

## 2018-05-15 RX ORDER — WARFARIN 2.5 MG/1
2.5 TABLET ORAL
Qty: 30 TAB | Refills: 2 | Status: SHIPPED | OUTPATIENT
Start: 2018-05-19 | End: 2018-07-09 | Stop reason: SDUPTHER

## 2018-05-15 RX ORDER — CARVEDILOL 3.12 MG/1
3.12 TABLET ORAL 2 TIMES DAILY WITH MEALS
Qty: 60 TAB | Refills: 2 | Status: SHIPPED | OUTPATIENT
Start: 2018-05-15 | End: 2018-08-21 | Stop reason: SDUPTHER

## 2018-05-15 RX ORDER — WARFARIN SODIUM 5 MG/1
5 TABLET ORAL
Qty: 30 TAB | Refills: 2 | Status: SHIPPED | OUTPATIENT
Start: 2018-05-15 | End: 2018-07-09 | Stop reason: SDUPTHER

## 2018-05-15 RX ADMIN — Medication 10 ML: at 14:44

## 2018-05-15 RX ADMIN — ESCITALOPRAM 10 MG: 10 TABLET, FILM COATED ORAL at 08:43

## 2018-05-15 RX ADMIN — FUROSEMIDE 60 MG: 40 TABLET ORAL at 08:41

## 2018-05-15 RX ADMIN — SODIUM CHLORIDE 100 ML/HR: 450 INJECTION, SOLUTION INTRAVENOUS at 06:48

## 2018-05-15 RX ADMIN — CARVEDILOL 3.12 MG: 3.12 TABLET, FILM COATED ORAL at 08:42

## 2018-05-15 RX ADMIN — OXYCODONE HYDROCHLORIDE 10 MG: 5 TABLET ORAL at 14:40

## 2018-05-15 RX ADMIN — ALBUTEROL SULFATE 2.5 MG: 2.5 SOLUTION RESPIRATORY (INHALATION) at 17:32

## 2018-05-15 RX ADMIN — OXYCODONE HYDROCHLORIDE 10 MG: 5 TABLET ORAL at 19:30

## 2018-05-15 RX ADMIN — BUDESONIDE 500 MCG: 0.5 INHALANT RESPIRATORY (INHALATION) at 17:32

## 2018-05-15 RX ADMIN — TIOTROPIUM BROMIDE 18 MCG: 18 CAPSULE ORAL; RESPIRATORY (INHALATION) at 06:06

## 2018-05-15 RX ADMIN — POLYETHYLENE GLYCOL (3350) 17 G: 17 POWDER, FOR SOLUTION ORAL at 08:40

## 2018-05-15 RX ADMIN — FERROUS SULFATE TAB 325 MG (65 MG ELEMENTAL FE) 325 MG: 325 (65 FE) TAB at 16:31

## 2018-05-15 RX ADMIN — FERROUS SULFATE TAB 325 MG (65 MG ELEMENTAL FE) 325 MG: 325 (65 FE) TAB at 08:41

## 2018-05-15 RX ADMIN — CARVEDILOL 3.12 MG: 3.12 TABLET, FILM COATED ORAL at 16:31

## 2018-05-15 RX ADMIN — SIMVASTATIN 20 MG: 20 TABLET, FILM COATED ORAL at 21:56

## 2018-05-15 RX ADMIN — BUDESONIDE 500 MCG: 0.5 INHALANT RESPIRATORY (INHALATION) at 06:06

## 2018-05-15 RX ADMIN — ALBUTEROL SULFATE 2.5 MG: 2.5 SOLUTION RESPIRATORY (INHALATION) at 06:06

## 2018-05-15 RX ADMIN — SENNOSIDES 8.6 MG: 8.6 TABLET, FILM COATED ORAL at 08:41

## 2018-05-15 RX ADMIN — ERYTHROPOIETIN 40000 UNITS: 40000 INJECTION, SOLUTION INTRAVENOUS; SUBCUTANEOUS at 14:42

## 2018-05-15 RX ADMIN — SACUBITRIL AND VALSARTAN 1 TABLET: 24; 26 TABLET, FILM COATED ORAL at 17:38

## 2018-05-15 RX ADMIN — WARFARIN SODIUM 5 MG: 2.5 TABLET ORAL at 17:43

## 2018-05-15 RX ADMIN — SACUBITRIL AND VALSARTAN 1 TABLET: 24; 26 TABLET, FILM COATED ORAL at 08:42

## 2018-05-15 NOTE — PROGRESS NOTES
Patient resting up in bed. Alert and oriented. Resting asleep. On Bi pap at present time. S1S2, bowel sounds active. Cast to left hand. IV to left forearm patent and clean. 3 liters nasal cannula. Touch call light on bed. Alert and oriented upon assessment. Pleasant affect. Repositioned in bed. No verbalized needs made known at present time. Assessment completed. See doc flow sheet for further assessments.

## 2018-05-15 NOTE — DISCHARGE SUMMARY
REHABILITATION DISCHARGE SUMMARY     Date of admission to [facility]: 2018  Discharge Date: 2018     Primary Care Physician: Dr Maya Butler    Admission Condition: fair  Discharged Condition: good    Hospital Course: The patient was admitted to [facility] at [facility] on 2018 s/p fall with bilateral hand fxs, right knee sprain with multiple active medical comorbidities                  Ms. Millie Simon is a normally functionally independent, ambidextrous 65yo AA female with hx of chronic coumadin therapy due to a mechanical aortic valve placement in , CAD s/p PCI and stent to LAD in , CKD, morbid obesity, Htn, COPD, REJI onTRILOGY at [facility] secondary pulmonology, OA and  MDS with chronic anemia followed by oncology, Dr Brian Leon, and received procrit Lindwood Martín was admitted thru the ED on  after she tripped entering the two steps into her home. She landed on her hands and knees. Head CT initially questioned the presence of a SDH but was later determined to be artifact. Xrays of the left hand showed an oblique fracture the distal fifth metacarpal metaphysis and an avulsion fracture at the base of the third proximal phalanx. Xray of the right hand revealed dorsal soft tissue swelling with a distal 5th proximal phalanx fracture. She has been seen by Ortho and her left hand is splinted and her last two digits of her right hand are rachel taped. She is not to lift anything with either hand.    xrays of the knees were negative but she has noted bruising, abrasion and swelling of the right knee. She has ecchymosis around the left eye with injected sclera. Her hgb on presentation was 8.6, but dropped to 7.4 on   And then 7.0 and she received a blood transfusion. Her hgb is currently 8.5 from 7.9 yesterday. Her  Creatinine has leveled out and her GFR 29 has improved to 31 on     Rehabilitation Course: Ms Millie Simon has multiple medical comorbidities as noted above.  Fortunately, she has remained relatively stable . She is on chronic O2 and a trilogy at noc which she continues. She has had no acute pulmonary events. She continued on inhalers and nebulizers and her sats have remained > 92%, even with activity. Initially, she would desat with activity but as her endurance improved, so did this as well. Her hgb was monitored due to a hx of  ? MDS for which she is followed by Dr Susannah No. Her history of anemia is in the setting of CKD with possible MDS currently on Procrit every 3 weeks at home, here inpatient is on 4000u weekly. Continued to be anemic while inpatient, so Dr Piper Powell was asked to assist. He said that it was OK to increase Procrit to 16246c weekly while here, we can either continue this weekly outpatient or increase q3/4 week doses (as high as 124515f if needed). Out pt will be set up with him. Her hgb is currently 8.4. Hemoccult  of the stool is negative. She required a blood transfusion during acute care for a hgb of 7 and she received one unit at [facility]. Her maximal hgb has been 9.5 after one unit given for symptomatic anemia. She is also iron deficient and on supplement. Her renal fxn is relatively stable. She has required IVFs on a few occasion and her lasix has been weaned from 80mg bid to 40mg bid. Her creat is baseline at 1.55. She remains on coumadin due to a hx of a mechanical heart valve and continues on home dosing with a current INR of 2.6 and therapeutic. Her pain is controlled. Ortho changed her over from a splint a casted wrist and carpal/metacarpal bones on the left with rachel taped digits 3 and 4 on the left and 4 and 5 on the right. NVI    Functional Level On Admission: min assist with bed mobility, CGA/min A to stand, amb 10ft with assist of two CGA .  She is mod to max assist with ADLs    Functional Level At Discharge:   Patient Seen For: AM;ADLs   Feeding/Eating   Feeding/Eating Assistance S   Grooming   Grooming Assistance  SBA   Upper Body Bathing   Bathing Assistance, Upper Max A Lower Body Bathing   Bathing Assistance, Lower  Max A   Toileting   Toileting Assistance (FIM Score) Mod A   Upper Body Dressing    Dressing Assistance  Max A   Lower Body Dressing    Dressing Assistance  Max A   Functional Transfers   Amount of Assistance Required Min A   Tub or Shower Type Shower   Amount of Assistance Required CGA   Adaptive Equipment Grab bars; Tub transfer bench      S: \"My girls got me, they will help. \" Agreeable to therapy. Focus of session was on morning ADL routine and family training. Family training included but was not limited to: safety in transfers, rachel taping fingers, water proofing left cast in shower, appropriate level of assist, equipment training, walker/device management, and demonstration for patients current level of function. Patient was able to ambulate ~15 feet using a no device with CGA. Past Medical History:   Diagnosis Date    Anticoagulated on Coumadin 7/9/2013    S/P AVR     Benign hypertension     controlled    CAD (coronary artery disease) 1/20/2013 5/8/14 PCI LAD with stent placed     Cardiomyopathy [facility] [address] [facility]     CHF (congestive heart failure) ([address] 2/17/2017    Chronic left-sided low back pain without sciatica 6/15/2016    Chronic respiratory failure ([address] 4/2/2014    CKD (chronic kidney disease) stage 3, GFR 30-59 ml/min 7/10/2013    COPD (chronic obstructive pulmonary disease) ([address] 4/2/2014    Essential hypertension, benign 1/20/2013    HLD (hyperlipidemia)     ICD (implantable cardioverter-defibrillator) in place 10/2/2014    [employer] single-chamber ICD implantation 10/20/14     Iron deficiency anemia due to chronic blood loss 7/29/2009    MDS (myelodysplastic syndrome) ([address] 12/17/2011    Procrit started in August, 2011 12/18/11 Procrit weekly and Iron stores. 5-12      12-13-12 good response to 3 weekly procrit did not need it last time  3-7-13 Pt doing well. Just wanted a \"check-up. \" Responding to Procrit every three weeks. 8-29-13 patient has missed some injections on recently restarted hemoglobin only issue , takes oral iron iron stores each time       ERJI (obstructive sleep apnea)-cpap 2014    Osteoarthritis     Osteopenia     Quadrantanopia     Secondary pulmonary hypertension     Tachycardia     Rapid H/B     Visual complaint 2015      Past Surgical History:   Procedure Laterality Date    CARDIAC CATHETERIZATION      HX AORTIC VALVE REPLACEMENT  ,     mechanical valve     HX  SECTION      HX CORONARY STENT PLACEMENT  May, 2014    STEMI with Stent placement.  HX ENDOSCOPY  2009    EGD    HX HEART CATHETERIZATION      HX PACEMAKER  10/2/2014    [employer] ICD    HX TUBAL LIGATION      IR BX BONE MARROW DIAGNOSTIC  2011      Family History   Problem Relation Age of Onset    Heart Disease Mother      CHF    Hypertension Mother     Kidney Disease Mother     Heart Disease Father      CHF    Lung Disease Father     Diabetes Father     Cancer Father      prostate    Hypertension Father     Heart Attack Father     Heart Disease Maternal Aunt     Diabetes Brother     Coronary Artery Disease Other     Breast Cancer Neg Hx       Social History   Substance Use Topics    Smoking status: Former Smoker     Packs/day: 0.25     Years: 42.00     Types: Cigarettes     Start date: 10/1/1956     Quit date: 2014    Smokeless tobacco: Never Used    Alcohol use No     Prior to Admission medications    Medication Sig Start Date End Date Taking? Authorizing Provider   carvedilol (COREG) 3.125 mg tablet Take 1 Tab by mouth two (2) times daily (with meals). 5/15/18  Yes Felisha Parker MD   warfarin (COUMADIN) 2.5 mg tablet Take 1 Tab by mouth two (2) days a week. 18  Yes Felisha Parker MD   warfarin (COUMADIN) 5 mg tablet Take 1 Tab by mouth five (5) days a week.  Indications: THROMBOEMBOLISM DUE TO PROSTHETIC HEART VALVES 5/15/18  Yes Felisha Tobar Kami Parada MD   warfarin (COUMADIN) 5 mg tablet Take 1 Tab by mouth five (5) days a week. 5/2/18   Juliodwain Carter, DO   warfarin (COUMADIN) 2.5 mg tablet Take 1 Tab by mouth two (2) days a week. 5/5/18   Juliocecy Ferrellon, DO   carvedilol (COREG) 3.125 mg tablet Take 1 Tab by mouth two (2) times daily (with meals). 5/2/18   Julio Carter, DO   bisacodyl (DULCOLAX) 10 mg suppository Insert 10 mg into rectum daily as needed. 5/2/18   Juliodwain Carter, DO   oxyCODONE IR (ROXICODONE) 10 mg tab immediate release tablet Take 1 Tab by mouth every four (4) hours as needed. Max Daily Amount: 60 mg. 5/2/18   Julio Carter DO   simvastatin (ZOCOR) 20 mg tablet TAKE 1 TABLET BY MOUTH EVERY DAY 4/5/18   Lyudmila Helm MD   escitalopram oxalate (LEXAPRO) 10 mg tablet TAKE 1 TAB BY MOUTH DAILY. INDICATIONS: ANXIETY WITH DEPRESSION 4/5/18   Lyudmila Helm MD   traMADol (ULTRAM) 50 mg tablet TAKE 1 TABLET BY MOUTH EVERY 4 HOURS AS NEEDED 4/5/18   Lyudmila Helm MD   isosorbide mononitrate ER (IMDUR) 30 mg tablet TAKE 1 TAB BY MOUTH EVERY MORNING. 4/5/18   Lyudmila Helm MD   furosemide (LASIX) 40 mg tablet Take 2 Tabs by mouth two (2) times a day. 4/5/18   Lyudmila Helm MD   ferrous gluconate 324 mg (38 mg iron) tablet TAKE 1 TAB BY MOUTH DAILY (BEFORE BREAKFAST). INDICATIONS: IRON DEFICIENCY ANEMIA  Patient taking differently: TAKE 1 TAB BY MOUTH DAILY (BEFORE BREAKFAST). INDICATIONS: IRON DEFICIENCY ANEMIA  Takes twice a day 4/5/18   Lyudmila Helm MD   fluticasone (FLONASE) 50 mcg/actuation nasal spray 2 Sprays by Both Nostrils route daily as needed. 3/7/18   Lyudmila Helm MD   umeclidinium (INCRUSE ELLIPTA) 62.5 mcg/actuation inhaler Take 1 Puff by inhalation daily. Indications: j44.9 2/27/18   Jose Enrique Infante NP   sacubitril-valsartan (ENTRESTO) 24 mg/26 mg tablet Take 1 Tab by mouth two (2) times a day.  INDICATIONS: CHRONIC HEART FAILURE 2/26/18   Boom Fletcher MD   loratadine (CLARITIN) 10 mg tablet TAKE 1 TAB BY MOUTH DAILY. Patient taking differently: TAKE 1 TAB BY MOUTH DAILY PRN 1/2/18   CAROLINA Hook   mometasone-formoterol (DULERA) 200-5 mcg/actuation HFA inhaler 2 puffs bid, rinse mouth after use. 8/30/17   Gerry Brown NP   albuterol (PROVENTIL VENTOLIN) 2.5 mg /3 mL (0.083 %) nebulizer solution 3 mL by Nebulization route every four (4) hours as needed for Wheezing. 8/30/17   Gerry Brown NP   albuterol (VENTOLIN HFA) 90 mcg/actuation inhaler 2 puffs qid prn 8/30/17   Gerry Brown NP   cholecalciferol, VITAMIN D3, (VITAMIN D3) 5,000 unit tab tablet Take 1 Tab by mouth daily. 6/15/16   Caprice Montero MD   acetaminophen (TYLENOL) 325 mg tablet Take 650 mg by mouth every four (4) hours as needed for Pain. Historical Provider   OXYGEN-AIR DELIVERY SYSTEMS 3 L by Nasal route continuous. Historical Provider   nitroglycerin (NITROSTAT) 0.4 mg SL tablet 0.4 mg by SubLINGual route every five (5) minutes as needed. Historical Provider   aspirin delayed-release (ASPIR-81) 81 mg tablet Take 81 mg by mouth every morning.     Historical Provider     Allergies   Allergen Reactions    Sulfa (Sulfonamide Antibiotics) Hives    Aspirin Nausea Only     Cannot take uncoated aspirin        Lab Review:   Recent Results (from the past 72 hour(s))   PROTHROMBIN TIME + INR    Collection Time: 05/14/18  6:27 AM   Result Value Ref Range    Prothrombin time 27.2 (H) 11.5 - 14.5 sec    INR 2.6     METABOLIC PANEL, BASIC    Collection Time: 05/14/18  6:27 AM   Result Value Ref Range    Sodium 141 136 - 145 mmol/L    Potassium 4.6 3.5 - 5.1 mmol/L    Chloride 101 98 - 107 mmol/L    CO2 33 (H) 21 - 32 mmol/L    Anion gap 7 7 - 16 mmol/L    Glucose 111 (H) 65 - 100 mg/dL    BUN 59 (H) 8 - 23 MG/DL    Creatinine 1.85 (H) 0.6 - 1.0 MG/DL    GFR est AA 35 (L) >60 ml/min/1.73m2    GFR est non-AA 29 (L) >60 ml/min/1.73m2    Calcium 8.6 8.3 - 10.4 MG/DL   HGB & HCT    Collection Time: 05/14/18  6:27 AM Result Value Ref Range    HGB 8.4 (L) 11.7 - 15.4 g/dL    HCT 27.3 (L) 35.8 - 46.3 %   OCCULT BLOOD, STOOL    Collection Time: 05/14/18 12:40 PM   Result Value Ref Range    Occult blood, stool NEGATIVE  NEG     HGB & HCT    Collection Time: 05/15/18  7:44 AM   Result Value Ref Range    HGB 8.4 (L) 11.7 - 15.4 g/dL    HCT 26.5 (L) 35.8 - 80.1 %   METABOLIC PANEL, BASIC    Collection Time: 05/15/18  7:44 AM   Result Value Ref Range    Sodium 143 136 - 145 mmol/L    Potassium 5.0 3.5 - 5.1 mmol/L    Chloride 106 98 - 107 mmol/L    CO2 29 21 - 32 mmol/L    Anion gap 8 7 - 16 mmol/L    Glucose 86 65 - 100 mg/dL    BUN 50 (H) 8 - 23 MG/DL    Creatinine 1.55 (H) 0.6 - 1.0 MG/DL    GFR est AA 43 (L) >60 ml/min/1.73m2    GFR est non-AA 35 (L) >60 ml/min/1.73m2    Calcium 8.9 8.3 - 10.4 MG/DL       Functional Assessment:          Balance  Sitting - Static: Good (unsupported) (05/15/18 1000)  Sitting - Dynamic: Good (unsupported) (05/15/18 1000)  Standing - Static: Good (05/15/18 1000)  Standing - Dynamic : Impaired (05/15/18 1000)       Feeding  Adaptive Equipment: Built up spoon;Non-skid surface;Plate guard (16/75/40 1045)             Cornelious Booty Fall Risk Assessment:  Cornelious Booty Fall Risk  Mobility: Ambulates or transfers with assist devices or assistance (05/15/18 0720)  Mobility Interventions: Patient to call before getting OOB; Communicate number of staff needed for ambulation/transfer (05/15/18 0720)  Mentation: Alert, oriented x 3 (05/15/18 0720)  Mentation Interventions: Adequate sleep, hydration, pain control;Door open when patient unattended;Evaluate medications/consider consulting pharmacy; Increase mobility (05/15/18 0720)  Medication: Patient receiving anticonvulsants, sedatives(tranquilizers), psychotropics or hypnotics, hypoglycemics, narcotics, sleep aids, antihypertensives, laxatives, or diuretics (05/15/18 0720)  Medication Interventions: Evaluate medications/consider consulting pharmacy; Patient to call before getting OOB (05/15/18 0720)  Elimination: Needs assistance with toileting (05/15/18 0720)  Elimination Interventions: Call light in reach (05/15/18 0720)  Prior Fall History: Before admission in past 12 months _home or previous inpatient care) (05/15/18 0720)  History of Falls Interventions: Consult care management for discharge planning;Door open when patient unattended;Evaluate medications/consider consulting pharmacy (05/15/18 0720)  Total Score: 4 (05/15/18 0720)  Standard Fall Precautions: Yes (05/15/18 0720)  High Fall Risk: Yes (05/15/18 0720)     Speech Assessment:         Ambulation:  Gait  Distance (ft): 60 Feet (ft) (05/15/18 1000)  Assistive Device:  (No device) (05/15/18 1000)  Rail Use: None (05/15/18 1000)     Visit Vitals    /70    Pulse 62    Temp 98.6 °F (37 °C)    Resp 18    Ht 5' 2\" (1.575 m)    Wt 223 lb (101.2 kg)    SpO2 99%    Breastfeeding No    BMI 40.79 kg/m2      Intake and Output:    05/13 1901 - 05/15 0700  In: 1920 [P.O.:720; I.V.:1200]  Out: 100 [Urine:100]    Discharge Exam:  General: Alert and age appropriately oriented. No acute cardio respiratory distress. HEENT: Normocephalic,no scleral icterus; left conjunctival hemorrhage much improved  Oral mucosa moist without cyanosis   Lungs: Clear to auscultation  bilaterally. Respiration even and unlabored   Heart: Regular rate and rhythm, S1, S2   No  murmurs, clicks, rub or gallops   Abdomen: Soft, non-tender, nondistended. Bowel sounds present. No organomegaly. obese   Genitourinary: defer . Neuromuscular:     Grossly no focal motor deficits noted. Moves ankles.  hindered by ortho injuries to hands, no change, dorsum of hands much less edema   Skin/extremity: No rashes, no erythema.  No calf tenderness BLE  No edema       Problem List as of 5/15/2018  Date Reviewed: 4/27/2018          Codes Class Noted - Resolved    * (Principal)Physical debility ICD-10-CM: R53.81  ICD-9-CM: 799.3  5/2/2018 - Present Closed nondisplaced fracture of shaft of fifth metacarpal bone of left hand ICD-10-CM: S62.357A  ICD-9-CM: 815.03  4/28/2018 - Present        Subdural hematoma (HCC) ICD-10-CM: I62.00  ICD-9-CM: 432.1  4/27/2018 - Present        Long term (current) use of anticoagulants (Chronic) ICD-10-CM: Z79.01  ICD-9-CM: V58.61  4/19/2018 - Present        Dysthymia ICD-10-CM: F34.1  ICD-9-CM: 300.4  4/5/2018 - Present        Type 2 diabetes mellitus with nephropathy (HCC) (Chronic) ICD-10-CM: E11.21  ICD-9-CM: 250.40, 583.81  12/18/2017 - Present        Pulmonary hypertension (HCC) (Chronic) ICD-10-CM: I27.20  ICD-9-CM: 416.8  6/15/2016 - Present        S/P AVR (aortic valve replacement) (Chronic) ICD-10-CM: Z95.2  ICD-9-CM: V43.3  Unknown - Present    Overview Signed 2/23/2016 11:04 AM by Brian Farrell     Mechanical/Artific             Cardiomyopathy ([facility] [address] (Chronic) ICD-10-CM: I42.9  ICD-9-CM: 425.4  Unknown - Present        Osteopenia ICD-10-CM: M85.80  ICD-9-CM: 733.90  Unknown - Present        HLD (hyperlipidemia) (Chronic) ICD-10-CM: E78.5  ICD-9-CM: 272.4  Unknown - Present        Osteoarthritis ICD-10-CM: M19.90  ICD-9-CM: 715.90  Unknown - Present        ICD (implantable cardioverter-defibrillator) in place ICD-10-CM: Z95.810  ICD-9-CM: V45.02  10/2/2014 - Present    Overview Signed 10/2/2014  4:58 PM by Solomon Lindo single-chamber ICD implantation 10/20/14             Diabetes mellitus type 2, diet-controlled ([facility] [address] (Chronic) ICD-10-CM: E11.9  ICD-9-CM: 250.00  8/28/2014 - Present        COPD (chronic obstructive pulmonary disease) ([facility] [address] (Chronic) ICD-10-CM: J44.9  ICD-9-CM: 264  4/2/2014 - Present        REJI (obstructive sleep apnea)-cpap (Chronic) ICD-10-CM: G47.33  ICD-9-CM: 327.23  4/2/2014 - Present        CKD (chronic kidney disease) stage 3, GFR 30-59 ml/min (Chronic) ICD-10-CM: N18.3  ICD-9-CM: 585.3  7/10/2013 - Present        Anticoagulated on Coumadin (Chronic) ICD-10-CM: Z51.81, Z79.01  ICD-9-CM: V58.83, V58.61  7/9/2013 - Present    Overview Signed 7/9/2013  4:16 PM by Bin Hurtado NP     S/P AVR             CAD (coronary artery disease) (Chronic) ICD-10-CM: I25.10  ICD-9-CM: 414.00  1/20/2013 - Present    Overview Signed 5/20/2014 10:05 AM by Stella Walton NP     5/8/14 PCI LAD with stent placed             Chronic combined systolic and diastolic heart failure (HCC) (Chronic) ICD-10-CM: I50.42  ICD-9-CM: 428.42  1/20/2013 - Present    Overview Addendum 4/28/2018  4:53 AM by Tre Alarcon MD     5/8/14 ECHO:  EF 10-15%  12/2017:  EF 25-30%             Essential hypertension, benign (Chronic) ICD-10-CM: I10  ICD-9-CM: 401.1  1/20/2013 - Present        MDS (myelodysplastic syndrome) (HCC) (Chronic) ICD-10-CM: D46.9  ICD-9-CM: 238.75  12/17/2011 - Present    Overview Addendum 8/29/2013 11:06 AM by Romain Caicedo started in August, 2011 12/18/11 Procrit weekly and Iron stores. 5-12 12-13-12 good response to 3 weekly procrit did not need it last time    3-7-13 Pt doing well. Just wanted a \"check-up. \" Responding to Procrit every three weeks.   8-29-13 patient has missed some injections on recently restarted hemoglobin only issue , takes oral iron iron stores each time                Iron deficiency anemia due to chronic blood loss (Chronic) ICD-10-CM: D50.0  ICD-9-CM: 280.0  7/29/2009 - Present        RESOLVED: CHF (congestive heart failure) ([facility] ICD-10-CM: I50.9  ICD-9-CM: 428.0  2/17/2017 - 4/27/2018        RESOLVED: Routine general medical examination at a health care facility ICD-10-CM: Z00.00  ICD-9-CM: V70.0  12/16/2016 - 4/27/2018        RESOLVED: Chest pain, unspecified ICD-10-CM: R07.9  ICD-9-CM: 786.50  12/7/2016 - 12/16/2016        RESOLVED: Chronic left-sided low back pain without sciatica ICD-10-CM: M54.5, G89.29  ICD-9-CM: 724.2, 338.29  6/15/2016 - 4/27/2018        RESOLVED: Tachycardia ICD-10-CM: R00.0  ICD-9-CM: 785.0  Unknown - 12/16/2016 Overview Signed 2/23/2016 11:02 AM by Duke Marc H/B              RESOLVED: Chronic respiratory failure ([facility] (Chronic) ICD-10-CM: J96.10  ICD-9-CM: 518.83  4/2/2014 - 4/27/2018              Discharge Instructions:   1. Diet: Diabetic Diet  2. Activity: Activity as tolerated; No lifting bilateral hands; continue rachel taping  3. Wound Care: None needed  Current Discharge Medication List      START taking these medications    Details   !! warfarin (COUMADIN) 2.5 mg tablet Take 1 Tab by mouth two (2) days a week. Qty: 30 Tab, Refills: 2    Associated Diagnoses: Subdural hematoma ([facility] [address] Anticoagulated on Coumadin; Closed nondisplaced fracture of shaft of fifth metacarpal bone of left hand, initial encounter; Subconjunctival hemorrhage, left; Sprain of right knee, initial encounter; Cardiomyopathy, unspecified type ([facility] [address] CKD (chronic kidney disease) stage 3, GFR 30-59 ml/min; Chronic combined systolic and diastolic heart failure ([address] Essential hypertension, benign; Chronic obstructive pulmonary disease, unspecified COPD type ([address] REJI (obstructive sleep apnea); Mixed hyperlipidemia; S/P AVR (aortic valve replacement); Pulmonary hypertension ([address] Type 2 diabetes mellitus with nephropathy ([address] Long term (current) use of anticoagulants; Closed nondisplaced fracture of shaft of fifth metacarpal bone of left hand, sequela; Iron deficiency anemia due to chronic blood loss; MDS (myelodysplastic syndrome) ([address] Coronary artery disease involving native coronary artery of native heart without angina pectoris; Diabetes mellitus type 2, diet-controlled ([address] ICD (implantable cardioverter-defibrillator) in place; Osteopenia, unspecified location; Osteoarthritis of shoulder, unspecified laterality, unspecified osteoarthritis type; Dysthymia      !! warfarin (COUMADIN) 5 mg tablet Take 1 Tab by mouth five (5) days a week.  Indications: THROMBOEMBOLISM DUE TO PROSTHETIC HEART VALVES  Qty: 30 Tab, Refills: 2 Associated Diagnoses: Subdural hematoma ([facility] Anticoagulated on Coumadin; Closed nondisplaced fracture of shaft of fifth metacarpal bone of left hand, initial encounter; Subconjunctival hemorrhage, left; Sprain of right knee, initial encounter; Cardiomyopathy, unspecified type ([facility] CKD (chronic kidney disease) stage 3, GFR 30-59 ml/min; Chronic combined systolic and diastolic heart failure ([facility] [facility] Essential hypertension, benign; Chronic obstructive pulmonary disease, unspecified COPD type ([facility] REJI (obstructive sleep apnea); Mixed hyperlipidemia; S/P AVR (aortic valve replacement); Pulmonary hypertension ([facility] Type 2 diabetes mellitus with nephropathy ([facility] Long term (current) use of anticoagulants; Closed nondisplaced fracture of shaft of fifth metacarpal bone of left hand, sequela; Iron deficiency anemia due to chronic blood loss; MDS (myelodysplastic syndrome) ([facility] Coronary artery disease involving native coronary artery of native heart without angina pectoris; Diabetes mellitus type 2, diet-controlled ([facility] ICD (implantable cardioverter-defibrillator) in place; Osteopenia, unspecified location; Osteoarthritis of shoulder, unspecified laterality, unspecified osteoarthritis type; Dysthymia       !! - Potential duplicate medications found. Please discuss with provider. CONTINUE these medications which have CHANGED    Details   carvedilol (COREG) 3.125 mg tablet Take 1 Tab by mouth two (2) times daily (with meals). Qty: 60 Tab, Refills: 2    Associated Diagnoses: Subdural hematoma ([facility] Anticoagulated on Coumadin; Closed nondisplaced fracture of shaft of fifth metacarpal bone of left hand, initial encounter; Subconjunctival hemorrhage, left; Sprain of right knee, initial encounter; Cardiomyopathy, unspecified type ([facility] CKD (chronic kidney disease) stage 3, GFR 30-59 ml/min; Chronic combined systolic and diastolic heart failure ([facility]  Essential hypertension, benign; Chronic obstructive pulmonary disease, unspecified COPD type ([facility] REJI (obstructive sleep apnea); Mixed hyperlipidemia; S/P AVR (aortic valve replacement); Pulmonary hypertension ([facility] Type 2 diabetes mellitus with nephropathy ([facility] Long term (current) use of anticoagulants; Closed nondisplaced fracture of shaft of fifth metacarpal bone of left hand, sequela; Iron deficiency anemia due to chronic blood loss; MDS (myelodysplastic syndrome) ([facility] Coronary artery disease involving native coronary artery of native heart without angina pectoris; Diabetes mellitus type 2, diet-controlled ([facility] ICD (implantable cardioverter-defibrillator) in place; Osteopenia, unspecified location; Osteoarthritis of shoulder, unspecified laterality, unspecified osteoarthritis type; Dysthymia         CONTINUE these medications which have NOT CHANGED    Details   !! warfarin (COUMADIN) 5 mg tablet Take 1 Tab by mouth five (5) days a week. Qty: 1 Tab, Refills: 0      !! warfarin (COUMADIN) 2.5 mg tablet Take 1 Tab by mouth two (2) days a week. Qty: 1 Tab, Refills: 0      oxyCODONE IR (ROXICODONE) 10 mg tab immediate release tablet Take 1 Tab by mouth every four (4) hours as needed. Max Daily Amount: 60 mg.  Qty: 1 Tab, Refills: 0    Associated Diagnoses: Closed nondisplaced fracture of shaft of fifth metacarpal bone of left hand, initial encounter      simvastatin (ZOCOR) 20 mg tablet TAKE 1 TABLET BY MOUTH EVERY DAY  Qty: 90 Tab, Refills: 0    Comments: FAXED  Associated Diagnoses: Mixed hyperlipidemia      escitalopram oxalate (LEXAPRO) 10 mg tablet TAKE 1 TAB BY MOUTH DAILY.  INDICATIONS: ANXIETY WITH DEPRESSION  Qty: 90 Tab, Refills: 0    Associated Diagnoses: Dysthymia      traMADol (ULTRAM) 50 mg tablet TAKE 1 TABLET BY MOUTH EVERY 4 HOURS AS NEEDED  Qty: 180 Tab, Refills: 1    Comments: Not to exceed 4 additional fills before 03/07/2018  Associated Diagnoses: Osteoarthritis of shoulder, unspecified laterality, unspecified osteoarthritis type      isosorbide mononitrate ER (IMDUR) 30 mg tablet TAKE 1 TAB BY MOUTH EVERY MORNING. Qty: 90 Tab, Refills: 3    Associated Diagnoses: Chronic combined systolic and diastolic heart failure (HCC)      furosemide (LASIX) 40 mg tablet Take 2 Tabs by mouth two (2) times a day. Qty: 120 Tab, Refills: 6    Associated Diagnoses: Essential hypertension, benign      ferrous gluconate 324 mg (38 mg iron) tablet TAKE 1 TAB BY MOUTH DAILY (BEFORE BREAKFAST). INDICATIONS: IRON DEFICIENCY ANEMIA  Qty: 90 Tab, Refills: 3    Associated Diagnoses: Iron deficiency anemia due to chronic blood loss      fluticasone (FLONASE) 50 mcg/actuation nasal spray 2 Sprays by Both Nostrils route daily as needed. Qty: 3 Bottle, Refills: 3      umeclidinium (INCRUSE ELLIPTA) 62.5 mcg/actuation inhaler Take 1 Puff by inhalation daily. Indications: j44.9  Qty: 1 Inhaler, Refills: 11      sacubitril-valsartan (ENTRESTO) 24 mg/26 mg tablet Take 1 Tab by mouth two (2) times a day. INDICATIONS: CHRONIC HEART FAILURE  Qty: 60 Tab, Refills: 6      loratadine (CLARITIN) 10 mg tablet TAKE 1 TAB BY MOUTH DAILY. Qty: 30 Tab, Refills: 1    Associated Diagnoses: Upper respiratory tract infection, unspecified type      mometasone-formoterol (DULERA) 200-5 mcg/actuation HFA inhaler 2 puffs bid, rinse mouth after use. Qty: 1 Inhaler, Refills: 11    Associated Diagnoses: Pulmonary hypertension ([address] Chronic respiratory failure with hypoxia (HCC)      albuterol (PROVENTIL VENTOLIN) 2.5 mg /3 mL (0.083 %) nebulizer solution 3 mL by Nebulization route every four (4) hours as needed for Wheezing. Qty: 120 Each, Refills: 11    Associated Diagnoses: Pulmonary hypertension ([address] Chronic respiratory failure with hypoxia (HCC)      albuterol (VENTOLIN HFA) 90 mcg/actuation inhaler 2 puffs qid prn  Qty: 1 Inhaler, Refills: 11    Associated Diagnoses: Pulmonary hypertension ([address]  Chronic respiratory failure with hypoxia (HCC)      cholecalciferol, VITAMIN D3, (VITAMIN D3) 5,000 unit tab tablet Take 1 Tab by mouth daily. Qty: 90 Tab, Refills: 4      acetaminophen (TYLENOL) 325 mg tablet Take 650 mg by mouth every four (4) hours as needed for Pain. OXYGEN-AIR DELIVERY SYSTEMS 3 L by Nasal route continuous. nitroglycerin (NITROSTAT) 0.4 mg SL tablet 0.4 mg by SubLINGual route every five (5) minutes as needed. !! - Potential duplicate medications found. Please discuss with provider. STOP taking these medications       bisacodyl (DULCOLAX) 10 mg suppository Comments:   Reason for Stopping:         aspirin delayed-release (ASPIR-81) 81 mg tablet Comments:   Reason for Stopping:               Rehabilitation Management:   1. Devices: per PT/OT  2.  Consult: Rehab team including PT, OT, recreational therapy, and     Disposition: home with [facility] and family assistance    Follow-up with Dr Marco Cui, Dr Iris Burroughs, primary pulmonologist and cardiologist; and orthopedics  >30min  Signed By: Teresa Arthur MD     May 16, 2018

## 2018-05-15 NOTE — PROGRESS NOTES
End Of Shift Functional Summary, Nursing        TOILETING:    Does patient need assist with adjusting pants up or down and/or pericare? yes  If yes, please indicate what the patient needs help with:  pants up pants down  Does the patient require extra time? yes  Does the patient require standby assistance? yes  Does the patient require contact guard assistance? yes  Does the patient require more than one staff member for assistance? No     TOILET TRANSFER:    Pt requires minimal assistance. Pt uses none. Does the patient require extra time? yes  Does the patient require contact guard assistance? yes  Does the patient require more than one staff member for assistance? no     BLADDER:    Pt does not have a medina catheter that staff manages. Pt does not take medication. Pt is continent. of bladder and voids in toilet  Pt requires staff to position device Pt has had 0 bladder accidents during this shift requiring standby assistance/setup to clean up. (An accident is when the episode is not contained in a brief AND/OR the clothing/linen requires changing/cleaning up.)  Pt has not had a bowel movement this shift.      Plan of care discussed with oncoming nurse for the next shift.

## 2018-05-15 NOTE — PROGRESS NOTES
Warfarin dosing per pharmacist    Norah James Nata Payne is a 77 y.o. female. Height: 5' 2\" (157.5 cm)    Weight: 101.2 kg (223 lb)    Indication:  Mechanical aortic valve replacement    Goal INR:  2-3    Home dose:  2.5 mg Mon, Fri; 5 mg all other days    Risk factors or significant drug interactions:  none    Other anticoagulants:  none    Daily Monitoring  Date  INR     Warfarin dose HGB              Notes  4/28  2.1          5 mg + 2.5 mg 7.4  4/29  2.4  5 mg  7  4/30  2.6  2.5 mg  8  5/1  2.9  5 mg  7.9  5/2  2.9  5 mg  8.5  Pharmacy consulted  5/3  2.7  5 mg  8.4  5/4  2.7  2.5 mg  ---   5/5  3  5 mg  ---   5/6  2.8  5 mg  8.1  5/7  2.8  2.5 mg  8.3  5/8  2.7  5 mg  9.2  5/9  2.5  5 mg  ---   5/10  ---  5 mg  9.5  5/11  2.5  2.5 mg  ---  5/12  ---  5 mg  ---  5/13  ---  5 mg  ---  5/14  2.6  2.5 mg  8.4  5/15  ---  5 mg  8.4    Pharmacy consulted to dose warfarin for Ms. Corral. She has been transferred to inpatient rehab following hospital stay for complications following a fall. She is followed by [facility] at the anti-coag clinic and was therapeutic on her home dose at her last visit on 4/19/2018. Continue current dose. INR M,W,F. Pharmacy will continue to follow. Please call with any questions.     Thank you,  Chelsey Stephenson, PharmD  Clinical Pharmacist  [phone]

## 2018-05-15 NOTE — PROGRESS NOTES
End Of Shift Functional Summary, Nursing      TOILETING:    Does patient need assist with adjusting pants up or down and/or pericare? yes  If yes, please indicate what the patient needs help with:  Pant up, pants down, shirt on, shirt off. Pt uses walker. Does the patient require extra time? yes  Does the patient require standby assistance? yes  Does the patient require contact guard assistance? yes  Does the patient require more than one staff member for assistance? yes    TOILET TRANSFER:    Pt requires moderate assistance. Pt uses walker. Does the patient require extra time? yes  Does the patient require contact guard assistance? yes  Does the patient require more than one staff member for assistance? yes    BLADDER:    Pt does not have a medina catheter that staff manages. Pt does not take medication. If so, please indicate which medication:     Pt is continent. of bladder and voids in toilet  Pt requires staff to empty device Pt has had 0 bladder accidents during this shift requiring moderate assistance to clean up. (An accident is when the episode is not contained in a brief AND/OR the clothing/linen requires changing/cleaning up.)    BOWEL:  Pt does take medication. Pt is continent of bowel and uses toilet. Pt requires staff to empty device    Pt has had 0 bowel accidents during this shift requiring moderate assistance from staff to clean up. (An accident is when the episode is not contained in a brief AND/OR the clothing/linen requires changing/cleaning up.)    BED/CHAIR TRANSFER  Pt requires moderate assistance. Pt uses walker  Does the patient require extra time? yes  Does the patient require contact guard assistance? yes  Does the patient require more than one staff member for assistance? yes                    EATING  Pt requires setup. Pt wears dentures. TUBE FEEDINGS:  Pt does not  receive nutrition through tube feedings.  t                Documentation reviewed and plan of care discussed/reviewed with   patient, physician, therapists, oncoming nurse, patient assistant and family/spouse during the shift.

## 2018-05-15 NOTE — PROGRESS NOTES
05/15/18 1113   Time Spent With Patient   Time In 0830   Time Out 0915   Patient Seen For: AM;ADLs   Feeding/Eating   Feeding/Eating Assistance S   Grooming   Grooming Assistance  SBA   Upper Body Bathing   Bathing Assistance, Upper Max A   Lower Body Bathing   Bathing Assistance, Lower  Max A   Toileting   Toileting Assistance (FIM Score) Mod A   Upper Body Dressing    Dressing Assistance  Max A   Lower Body Dressing    Dressing Assistance  Max A   Functional Transfers   Amount of Assistance Required Min A   Tub or Shower Type Shower   Amount of Assistance Required CGA   Adaptive Equipment Grab bars; Tub transfer bench     S: \"My girls got me, they will help. \" Agreeable to therapy. Focus of session was on morning ADL routine and family training. Family training included but was not limited to: safety in transfers, rachel taping fingers, water proofing left cast in shower, appropriate level of assist, equipment training, walker/device management, and demonstration for patients current level of function. Patient was able to ambulate ~15 feet using a no device with CGA. Pain not indicated during session. Collaborated with PT, Halle Mack and confirmed patient is on track to reach goals as documented in the care plan. Patient tolerated session well, but strength, balance, activity tolerance, RUE shoulder pain are still below baseline and requires skilled facilitation to successfully and safely complete ADL's and transfers. Patient ended session in in w/c with daughters in gym, resting before PT with Halle Mack.      Emanuel Hernandez OTR

## 2018-05-15 NOTE — PROGRESS NOTES
Sarah Anderson MD,   Medical Director  [address] [facility], [address]  Tel: [phone]       [facility] PROGRESS NOTE    Kay Cruz  Admit Date: 5/2/2018  Admit Diagnosis: Debillity ; Physical debility    Subjective     Feeling defeated. Afraid \"Im johnnie rmak. \" says she wants to die at home if that's what she is doing. No new symptoms to state why she feels this way. Upset about hgb and no \"quick fix\" from hematology. Explained that this has been a chronic issue and wont be \"fixed\" overnoc. No cp,sob, n/v/headache. Did well in therapy yesterday.  Anxious to go home tomorrow    Objective:     Current Facility-Administered Medications   Medication Dose Route Frequency    furosemide (LASIX) tablet 60 mg  60 mg Oral DAILY    0.45% sodium chloride infusion  100 mL/hr IntraVENous CONTINUOUS    epoetin jairo (EPOGEN;PROCRIT) injection 40,000 Units  40,000 Units SubCUTAneous Q7D    albuterol (PROVENTIL VENTOLIN) nebulizer solution 2.5 mg  2.5 mg Nebulization BID RT    And    budesonide (PULMICORT) 500 mcg/2 ml nebulizer suspension  500 mcg Nebulization BID RT    0.9% sodium chloride infusion 250 mL  250 mL IntraVENous PRN    diphenhydrAMINE (BENADRYL) capsule 25 mg  25 mg Oral Q6H PRN    ferrous sulfate tablet 325 mg  1 Tab Oral BID WITH MEALS    acetaminophen (TYLENOL) tablet 650 mg  650 mg Oral Q4H PRN    bisacodyl (DULCOLAX) tablet 5 mg  5 mg Oral DAILY PRN    LORazepam (ATIVAN) tablet 1 mg  1 mg Oral BID PRN    naloxone (NARCAN) injection 0.4 mg  0.4 mg IntraVENous PRN    prochlorperazine (COMPAZINE) injection 5 mg  5 mg IntraVENous Q8H PRN    sodium chloride (NS) flush 5-10 mL  5-10 mL IntraVENous PRN    albuterol (PROVENTIL VENTOLIN) nebulizer solution 2.5 mg  2.5 mg Nebulization Q4H PRN    bisacodyl (DULCOLAX) suppository 10 mg  10 mg Rectal DAILY PRN    carvedilol (COREG) tablet 3.125 mg  3.125 mg Oral BID WITH MEALS    escitalopram oxalate (LEXAPRO) tablet 10 mg  10 mg Oral DAILY    loratadine (CLARITIN) tablet 10 mg  10 mg Oral DAILY PRN    oxyCODONE IR (ROXICODONE) tablet 10 mg  10 mg Oral Q4H PRN    polyethylene glycol (MIRALAX) packet 17 g  17 g Oral DAILY    sacubitril-valsartan (ENTRESTO) 24-26 mg tablet 1 Tab  1 Tab Oral BID    senna (SENOKOT) tablet 8.6 mg  1 Tab Oral DAILY    simvastatin (ZOCOR) tablet 20 mg  20 mg Oral QHS    tiotropium (SPIRIVA) inhalation capsule 18 mcg  1 Cap Inhalation DAILY    traMADol (ULTRAM) tablet 50 mg  50 mg Oral Q6H PRN    warfarin (COUMADIN) tablet 2.5 mg  2.5 mg Oral Once per day on Mon Fri    warfarin (COUMADIN) tablet 5 mg  5 mg Oral Once per day on Sun Tue Wed Thu Sat     Review of Systems:Denies chest pain, shortness of breath, cough, headache, visual problems, abdominal pain, dysurea, calf pain. Pertinent positives are as noted in the medical records and unremarkable otherwise. Visit Vitals    /70    Pulse 62    Temp 98.6 °F (37 °C)    Resp 18    Ht 5' 2\" (1.575 m)    Wt 223 lb (101.2 kg)    SpO2 99%    Breastfeeding No    BMI 40.79 kg/m2        Physical Exam:   General: Alert and age appropriately oriented. No acute cardio respiratory distress. HEENT: Normocephalic,no scleral icterus; left conjunctival hemorrhage much improved  Oral mucosa moist without cyanosis   Lungs: Clear to auscultation  bilaterally. Respiration even and unlabored   Heart: Regular rate and rhythm, S1, S2   No  murmurs, clicks, rub or gallops   Abdomen: Soft, non-tender, nondistended. Bowel sounds present. No organomegaly. obese   Genitourinary: defer . Neuromuscular:      Grossly no focal motor deficits noted. Moves ankles.  hindered by ortho injuries to hands, no change, dorsum of hands much less edema   Skin/extremity: No rashes, no erythema.  No calf tenderness BLE  No edema                                                                            Functional Assessment: Balance  Sitting - Static: Good (unsupported) (05/12/18 1200)  Sitting - Dynamic: Good (unsupported) (05/12/18 1200)  Standing - Static: Good (05/12/18 1200)  Standing - Dynamic : Impaired (05/12/18 1200)       Feeding  Adaptive Equipment: Built up spoon;Non-skid surface;Plate guard (75/60/00 1045)             [place] Shows Fall Risk Assessment:  [place] Shows Fall Risk  Mobility: Ambulates or transfers with assist devices or assistance (05/14/18 2009)  Mobility Interventions: Patient to call before getting OOB; Strengthening exercises (ROM-active/passive); Utilize walker, cane, or other assitive device (05/14/18 2009)  Mentation: Alert, oriented x 3 (05/14/18 2009)  Mentation Interventions: Adequate sleep, hydration, pain control;Door open when patient unattended; Increase mobility;More frequent rounding (05/14/18 2009)  Medication: Patient receiving anticonvulsants, sedatives(tranquilizers), psychotropics or hypnotics, hypoglycemics, narcotics, sleep aids, antihypertensives, laxatives, or diuretics (05/14/18 2009)  Medication Interventions: Evaluate medications/consider consulting pharmacy; Patient to call before getting OOB; Teach patient to arise slowly (05/14/18 2009)  Elimination: Needs assistance with toileting (05/14/18 2009)  Elimination Interventions: Call light in reach (05/14/18 2009)  Prior Fall History: Before admission in past 12 months _home or previous inpatient care) (05/14/18 2009)  History of Falls Interventions: Consult care management for discharge planning (05/14/18 2009)  Total Score: 4 (05/14/18 2009)  Standard Fall Precautions: Yes (05/03/18 8309)  High Fall Risk: Yes (05/14/18 2009)     Speech Assessment:         Ambulation:  Gait  Distance (ft): 60 Feet (ft) (05/14/18 1632)  Assistive Device:  (No device) (05/12/18 1200)  Rail Use: None (05/14/18 1235)     Labs/Studies:  Recent Results (from the past 72 hour(s))   PROTHROMBIN TIME + INR    Collection Time: 05/14/18  6:27 AM   Result Value Ref Range Prothrombin time 27.2 (H) 11.5 - 14.5 sec    INR 2.6     METABOLIC PANEL, BASIC    Collection Time: 05/14/18  6:27 AM   Result Value Ref Range    Sodium 141 136 - 145 mmol/L    Potassium 4.6 3.5 - 5.1 mmol/L    Chloride 101 98 - 107 mmol/L    CO2 33 (H) 21 - 32 mmol/L    Anion gap 7 7 - 16 mmol/L    Glucose 111 (H) 65 - 100 mg/dL    BUN 59 (H) 8 - 23 MG/DL    Creatinine 1.85 (H) 0.6 - 1.0 MG/DL    GFR est AA 35 (L) >60 ml/min/1.73m2    GFR est non-AA 29 (L) >60 ml/min/1.73m2    Calcium 8.6 8.3 - 10.4 MG/DL   HGB & HCT    Collection Time: 05/14/18  6:27 AM   Result Value Ref Range    HGB 8.4 (L) 11.7 - 15.4 g/dL    HCT 27.3 (L) 35.8 - 46.3 %       Assessment:     Problem List as of 5/15/2018  Date Reviewed: 4/27/2018          Codes Class Noted - Resolved    * (Principal)Physical debility ICD-10-CM: R53.81  ICD-9-CM: 799.3  5/2/2018 - Present        Closed nondisplaced fracture of shaft of fifth metacarpal bone of left hand ICD-10-CM: S62.357A  ICD-9-CM: 815.03  4/28/2018 - Present        Subdural hematoma (HCC) ICD-10-CM: I62.00  ICD-9-CM: 432.1  4/27/2018 - Present        Long term (current) use of anticoagulants (Chronic) ICD-10-CM: Z79.01  ICD-9-CM: V58.61  4/19/2018 - Present        Dysthymia ICD-10-CM: F34.1  ICD-9-CM: 300.4  4/5/2018 - Present        Type 2 diabetes mellitus with nephropathy (HCC) (Chronic) ICD-10-CM: E11.21  ICD-9-CM: 250.40, 583.81  12/18/2017 - Present        Pulmonary hypertension (HCC) (Chronic) ICD-10-CM: I27.20  ICD-9-CM: 416.8  6/15/2016 - Present        S/P AVR (aortic valve replacement) (Chronic) ICD-10-CM: Z95.2  ICD-9-CM: V43.3  Unknown - Present    Overview Signed 2/23/2016 11:04 AM by Jae Ruiz     Mechanical/Artific             Card[facility]omyopathy [facility] (Chronic) ICD-10-CM: I42.9  ICD-9-CM: 425.4  Unknown - Present        Osteopenia ICD-10-CM: M85.80  ICD-9-CM: 733.90  Unknown - Present        HLD (hyperlipidemia) (Chronic) ICD-10-CM: E78.5  ICD-9-CM: 272.4  Unknown - Present Osteoarthritis ICD-10-CM: M19.90  ICD-9-CM: 715.90  Unknown - Present        ICD (implantable cardioverter-defibrillator) in place ICD-10-CM: Z95.810  ICD-9-CM: V45.02  10/2/2014 - Present    Overview Signed 10/2/2014  4:58 PM by Myles Dougherty single-chamber ICD implantation 10/20/14             Diabetes mellitus type 2, diet-controlled ([facility] (Chronic) ICD-10-CM: E11.9  ICD-9-CM: 250.00  8/28/2014 - Present        COPD (chronic obstructive pulmonary disease) ([facility] (Chronic) ICD-10-CM: J44.9  ICD-9-CM: 496  4/2/2014 - Present        REJI (obstructive sleep apnea)-cpap (Chronic) ICD-10-CM: G47.33  ICD-9-CM: 327.23  4/2/2014 - Present        CKD (chronic kidney disease) stage 3, GFR 30-59 ml/min (Chronic) ICD-10-CM: N18.3  ICD-9-CM: 585.3  7/10/2013 - Present        Anticoagulated on Coumadin (Chronic) ICD-10-CM: Z51.81, Z79.01  ICD-9-CM: V58.83, V58.61  7/9/2013 - Present    Overview Signed 7/9/2013  4:16 PM by Eliu Renae NP     S/P AVR             CAD (coronary artery disease) (Chronic) ICD-10-CM: I25.10  ICD-9-CM: 414.00  1/20/2013 - Present    Overview Signed 5/20/2014 10:05 AM by Gardiner Cockayne, NP     5/8/14 PCI LAD with stent placed             Chronic combined systolic and diastolic heart failure (HCC) (Chronic) ICD-10-CM: I50.42  ICD-9-CM: 428.42  1/20/2013 - Present    Overview Addendum 4/28/2018  4:53 AM by Coby Cockayne, MD     5/8/14 ECHO:  EF 10-15%  12/2017:  EF 25-30%             Essential hypertension, benign (Chronic) ICD-10-CM: I10  ICD-9-CM: 401.1  1/20/2013 - Present        MDS (myelodysplastic syndrome) (HCC) (Chronic) ICD-10-CM: D46.9  ICD-9-CM: 238.75  12/17/2011 - Present    Overview Addendum 8/29/2013 11:06 AM by Leatha Ashraf started in August, 2011 12/18/11 Procrit weekly and Iron stores. 5-12         12-13-12 good response to 3 weekly procrit did not need it last time    3-7-13 Pt doing well. Just wanted a \"check-up. \" Responding to Procrit every three weeks. 8-29-13 patient has missed some injections on recently restarted hemoglobin only issue , takes oral iron iron stores each time                Iron deficiency anemia due to chronic blood loss (Chronic) ICD-10-CM: D50.0  ICD-9-CM: 280.0  7/29/2009 - Present        RESOLVED: CHF (congestive heart failure) (HCC) ICD-10-CM: I50.9  ICD-9-CM: 428.0  2/17/2017 - 4/27/2018        RESOLVED: Routine general medical examination at a health care facility ICD-10-CM: Z00.00  ICD-9-CM: V70.0  12/16/2016 - 4/27/2018        RESOLVED: Chest pain, unspecified ICD-10-CM: R07.9  ICD-9-CM: 786.50  12/7/2016 - 12/16/2016        RESOLVED: Chronic left-sided low back pain without sciatica ICD-10-CM: M54.5, G89.29  ICD-9-CM: 724.2, 338.29  6/15/2016 - 4/27/2018        RESOLVED: Tachycardia ICD-10-CM: R00.0  ICD-9-CM: 785.0  Unknown - 12/16/2016    Overview Signed 2/23/2016 11:02 AM by Elizabeth Marc H/B              RESOLVED: Chronic respiratory failure (HCC) (Chronic) ICD-10-CM: J96.10  ICD-9-CM: 518.83  4/2/2014 - 4/27/2018                         Plan:   S/p fall with bilateral hand fxs, right knee sprain, FELIZ on CKD, anemia with underlying dx of MDS; physical debility       Continue daily physician medical management:  Pneumonia prophylaxis- Incentive spirometer every hour while awake      Digit fxs both hands; cont splint/rachel tape; only restriction is no lifting; has chronic RUE weakness from what I suspect is a RTC injury; xray neg; OT working on modalities  -5/10 dec edema, increase use of fingers  5/11 dc splint; cont rachel taping 3rd and 4th digits right hand and 4th /5th digits on left hand. No lifting but can use hands for ADLs as tolerated  -5/14 now with wrist/mc cast on left; neuro intact mary. Using fingers more despite immobilized digits.       COPD/REJI; pt is on Tr[facility] at [facility].  She manages this at home but having difficulty do to fxs in her hands  -cont Proventil , Pulmicort; Spiriva; Naty Crabtree weaning daytime O2; currently 4-6L due to hypoxia. Doesn't use during day at home per pt; 5/3 NOW reports chronic 3L at home, thus at baseline  -5/3 down from 4 to 2L , sats 98%; 5/4 on 3L  sats 96% (obviously needs due to drop noted to 65% sats when O2 accidentally not hooked up last noc and pt symptomatic with chest pain which resolved immediately when placed back on O2  -5/5 comfortable. No PRUETT, on home 3L NC, lowest sat 91%; 5/10 86% after doing 3 stairs  - 5/13 albuterol nebs tapered to bid      Severe CHF/cardiomyopathy; compensated currently; cont Coreg, Lasix; has an ICD ; on entresto as well, 5/3  -5/3 no evidence of acute issues; 5/4 compensated well and considering dec Lasix  -5/5 recheck renal fx, may dec lasix  -5/8 dec Lasix due to FELIZ on CKD; compensated; holding entresto  -5/10 restarted Entresto and have decreased Lasix to home dose of 80mg once daily; have dec lasix to 60, dec entresto to daily?      DVT risk / DVT Prophylaxis- Will require daily physician exam to assess for signs and symptoms as patient is at increased risk for of thromboembolism. Mobilization as tolerated. Intermittent pneumatic compression devices when in bed Thigh-high or knee-high thromboembolic deterrent hose when out of bed. On therapeutic Coumadin       Pain Control: ongoing significant pain in back, shoulders, knees and hands which is stable and controlled by PRN meds. Will require regular pain assessment and comprenhensive pain management,   -has end stage OA of the knees and now with right knee sprain; ice/ace wrap, modalities      Wound Care: Monitor wound status daily per staff and physician. At risk for failure. Will require 24/7 rehab nursing. None needed but at risk due to immobility   -5/3 hematoma dorsum right hand; warm compress      Hypertension - BP uncontrolled, fluctuating, managed medically.  Has hypotension, asymptomatic, parameters with meds   -5/3 119-141 SBP, controlled; 5/4 /66 -; on Coreg, Lasix and Entresto; CHF well compensated; consider decreasing lasix which is dosed at 80mg bid  5/5 bp 141/60; 5/6 117/78 ; 5/9 elevated today 156/83 ; 5/10 improved  HR and BP acceptable  -5/14 hypotensive; asymptomatic; hold coreg and entresto. Dec lasix; likely due to dehydration and anemia  -5/15 109-145; stable hold coreg and entresto; taken for CHF; currently well compensated. On low dose coreg, change entresto to daily; already on low dose 24-26      Scleral hemorrhage; clinically unchanged but less periocular swelling  -5/14 improving, no complications      MDS/chronic anemia; monitor , replace as needed, consider epogen  -5/3 hgb stable at 8.4; 5/4 consider dose of epogen but I believe there are restrictions to ordering and may need hematology, will increase iron due to deficiency; recheck 5/6; 8.1  -consult hematology Mond if labs concerning; looking back at past notes; was on weekly procrit if hgb < 10; will order  -transf 1 unit 5/7 ; hbg 9.2 from 8.3; cont epogen weekly. F/u with hematology  -5/14 hgb 8.4; reced epogen yesterday; consult Hematology; followed by Dr Janie Nelson  -5/15 Dr Sulma Delgado note appreciated. 40k Epogen given with f/u as outpt      FELIZ on CKD; improving, monitor labs and adjust meds; daily wt due to CHF  -creat slowly improving 5/3; 1.53 from 1.71 yesterday, BUN remains unchanged, 50's; monitor labs 3x/wk  -5/7 creat up; push fluids  -5/8 start 1/4 NS at 100 x 2 bags, creat 1.99 with bun 70s; dec Lasix and hold entresto  -5/9 Creat 1.65, bun 65; cont IVFs x 2 bags and hold entresto and cont dec dose Lasix. -5/10 creat 1.46; stopped IVFs , bun 58; recheck 5/14; bun and creat up; 1.85; IVF x 24hrs; usually responds well to this.  Dec lasix  -5/15 labs pending      Chronic coumadin therapy due to mech AVR; goal INR 2-3; inr 2.9; monitor and have pharmacy assist in dosing;   5/4 INR therap at 2.7 on home alternating doses of coumadin (2.5mg M/F and 5mg S/Tue/W/TH/SAT)  -5/9 remains therapeutic on coumadin per Pharmacy dosing; appreciate  -5/11 INR therapeutic at 2.5 on home dosing as noted above  No labs over the weekend, PT/INR tomorrow  ; INR 2.6 TODAY 5/14    -pending this a.m. 5/15                       ? Hx of DM listed but on no medications ; do have on diet restrictions; carbohydrate restrictions      Urinary retention/ neurogenic bladder - schedule voids q6-8 hrs. Check post-void residual as needed; In and Out catheterize if post-void residual is more than 400 cc.  -5/4 no issues      bowel program - constipation, added bowel program; 5/4 continent; no const/diarr      GERD - resume PPI. At times may need additional antacids, Maalox prn.               Time spent was 25 minutes with over 1/2 in direct patient care/examination, consultation and coordination of care.      Signed By: Cathi Parisi MD     May 15, 2018

## 2018-05-15 NOTE — PROGRESS NOTES
Subjective \"Girl, I hope I can go home tomorrow but I am not sure it is going to happen. \"   Activity Bean bag toss with the use of her RUE   Strength/Endurance Patient tolerated the session with no c/o tiredness or fatigue. She was motivated to participate. Balance Sit<->stand:  SBA without AD   Social Interaction Patient joked around and had a great time with this therapist. She was in a positive mood and appeared to enjoy herself. Cognitive A&O X4   Comments Patient was handed off to Raj painter PT at the end of the session.      Ashlee Grover, JACINTAS

## 2018-05-15 NOTE — PROGRESS NOTES
PHYSICAL THERAPY DAILY NOTE  Time In: 0916  Time Out: 1003  Patient Seen For: AM;Family training;Gait training;Patient education; Therapeutic exercise;Transfer training; Other (see progress notes)    Subjective: patient reporting she feels OK. Reports she is ready to go home. Reports her daughters are here for family training. Objective:Vital Signs:  Patient Vitals for the past 12 hrs:   Temp Pulse Resp BP SpO2   05/15/18 0739 98.6 °F (37 °C) 62 18 109/70 99 %   05/15/18 0606 - - - - 96 %     Pain level:2 to 4 out of 10  Pain location:both hands and right knee  Pain interventions:Pain medication per RN, rest, positioning,ROM for right knee    Patient education:patient family education as noted below    Interdisciplinary Communication:    Other (comment) (fall precautions, NWB Left hand)  GROSS ASSESSMENT Daily Assessment     NA       BED/MAT MOBILITY Daily Assessment    Rolling Right : 0 (Not tested)  Rolling Left : 0 (Not tested)  Supine to Sit : 0 (Not tested)  Sit to Supine : 0 (Not tested)       TRANSFERS Daily Assessment   Increased time and effort to complete demonstrating body mechanics   Transfer Type: SPT without device  Transfer Assistance : 6 (Modified independent)  Sit to Stand Assistance: Modified independent  Car Transfers: Minimum assistance  Car Type: rehab car       GAIT Daily Assessment   Discussed with patient's family fall precautions in home environment including management of 02 line Amount of Assistance: 6 (Modified independent)  Distance (ft): 60 Feet (ft)  Assistive Device:  (No device)   Patient able to demonstrate gait for 60 ft managing 02 line with no loss of balance and proper breathing pattern. STEPS or STAIRS Daily Assessment   Instructed patient's family on how to assist patient with ambulation up/down steps using gait belt, one step at a time leading up with LLE and down with RLE. 3 min sitting rest break after going up steps.   Steps/Stairs Ambulated (#): 4  Level of Assist : 4 (Minimal assistance)  Rail Use: None       BALANCE Daily Assessment    Sitting - Static: Good (unsupported)  Sitting - Dynamic: Good (unsupported)  Standing - Static: Good  Standing - Dynamic : Impaired       WHEELCHAIR MOBILITY Daily Assessment    Curbs/Ramps Assist Required (FIM Score): 0 (Not tested)  Wheelchair Setup Assist Required : 0 (Not tested)       LOWER EXTREMITY EXERCISES Daily Assessment   Instructed patient's family on how to assist patient with LE HEP. Issued written LE HEP and red theraband Extremity: Both  Exercise Type #1: Seated lower extremity strengthening  Sets Performed: 1  Reps Performed: 20  Level of Assist: Minimal assistance (with right knee flex and hip ext w/ red t-band)     SEATED EXERCISES Sets Reps Comments   Ankle Pumps 1 20    Hip Flexion 2 10    Long Arc Quads 2 10    Hip Adduction/Ball Squeeze 1 20    Hip Abduction with red Theraband 1 20    Hamstring Curls with red Theraband 1 20    Hip Extension with red Theraband 1 20               Assessment: Family and patient demonstrating and verbalizing understanding of training noted above       Patient returned to room at end of treatment and remained up in recliner with LEs elevated and with needs in reach. 02 at 3 lpm    Plan of Care: Continue with POC and progress as tolerated.      Aliyah Murray, PT  5/15/2018

## 2018-05-15 NOTE — PROGRESS NOTES
PHYSICAL THERAPY DAILY NOTE  Time In: 1346  Time Out: 2742  Patient Seen For: PM;Gait training;Patient education; Therapeutic exercise;Transfer training; Other (see progress notes)    Subjective: Patient reporting she is tired this PM. Reports she needs a little help standing up when she is tired. Reports she will have help from family and CLTC aide at home         Objective:Vital Signs:  Patient Vitals for the past 12 hrs:   Temp Pulse Resp BP SpO2   05/15/18 0739 98.6 °F (37 °C) 62 18 109/70 99 %   05/15/18 0606 - - - - 96 %     Pain level:4 out of 10  Pain location:both hands and right knee  Pain interventions:Pain medication per RN, rest, positioning,ROM for right knee    Patient education:Balance training,transfer training, gait training, fall precautions, activity pacing, body mechanics,energy conservation, Patient verbalizing understanding and demonstrating understanding of patient education. Recommend follow up education. Interdisciplinary Communication:NA    Other (comment) (fall precautions, NWB Left hand)  GROSS ASSESSMENT Daily Assessment     NA       BED/MAT MOBILITY Daily Assessment    Rolling Right : 0 (Not tested)  Rolling Left : 0 (Not tested)  Supine to Sit : 0 (Not tested)  Sit to Supine : 0 (Not tested)       TRANSFERS Daily Assessment   Increased time and effort to complete with cues for body mechanics   Transfer Type: SPT without device  Transfer Assistance : 6 (Modified independent)  Sit to Stand Assistance: Minimal assistance (from w/c and recliner)  Car Transfers: Not tested  Car Type: rehab car       GAIT Daily Assessment    4 to 5 min rest breaks between attempts Amount of Assistance: 5 (Supervision/setup) (assist with IV pole)  Distance (ft): 30 Feet (ft)  Assistive Device:  (No device)   Gait training x 30 ft x 3 in figure 8 pattern around bolsters. No loss of balance making multiple turns.     STEPS or STAIRS Daily Assessment    Steps/Stairs Ambulated (#): 0  Level of Assist : 0 (Not tested)  Rail Use: None       BALANCE Daily Assessment    Sitting - Static: Good (unsupported)  Sitting - Dynamic: Good (unsupported)  Standing - Static: Good  Standing - Dynamic : Impaired       WHEELCHAIR MOBILITY Daily Assessment    Curbs/Ramps Assist Required (FIM Score): 0 (Not tested)  Wheelchair Setup Assist Required : 0 (Not tested)       LOWER EXTREMITY EXERCISES Daily Assessment    Extremity: Both  Exercise Type #1: Other (comment) (LE motomed x 10 mins at level 1)  Level of Assist: Supervision          Assessment: Tendency to fatigue in PM with increased assistance required for sit to stand. Patient returned to room at end of treatment and remained up in recliner with LEs elevated and with needs in reach.  02 at 3 lpm    Plan of Care: Complete D/C assessment in AM 05/16/18    Zacarias Chavarria, PT  5/15/2018

## 2018-05-15 NOTE — PROGRESS NOTES
Problem: Falls - Risk of  Goal: *Absence of Falls  Document Tia Fall Risk and appropriate interventions in the flowsheet.    Outcome: Progressing Towards Goal  Fall Risk Interventions:  Mobility Interventions: Patient to call before getting OOB, Communicate number of staff needed for ambulation/transfer    Mentation Interventions: Adequate sleep, hydration, pain control, Door open when patient unattended, Evaluate medications/consider consulting pharmacy, Increase mobility    Medication Interventions: Evaluate medications/consider consulting pharmacy, Patient to call before getting OOB    Elimination Interventions: Call light in reach    History of Falls Interventions: Consult care management for discharge planning, Door open when patient unattended, Evaluate medications/consider consulting pharmacy

## 2018-05-15 NOTE — PROGRESS NOTES
OT Daily Note    Time In 1117   Time Out 1200     Subjective:\"what do you have planned today\" Agreeable to therapy. Pain:right arm pain better, but ROM remains nonfunctional  Interdisciplinary Communication: Collaborated with Jazmín Marquez and patient is making progress towards goals. Precautions: Other (comment) (fall precautions; NWB LUE)    Balance/functional mobility Daily Assessment   Ambulated 20' x 2 with SBA no device. R UE Daily Assessment   Added biofreeze start of session for pain relief on [address] joint and scapula. Also only complete light tissue work on scapula and [address] for pain relief with good results this session. Addition of some scapular depression all passively and slowly. Some circumduction completed with right [address] joint with arm hanging next to patient in w/c, passively. Gross motor control Daily Assessment   LUE used with first 2 digits to grasp various sized bean bags to toss into bin 7 feet away with no pain in 3-5th digits. Completed 2 times throwing all 20 bean bags. Patient enjoyed task, it did cause mild fatigue. Feeding Daily Assessment   Set up assist only, new cast on LUE allows for better wrist mobility allowing increased ability to feed. .            Ended session:In recliner, setup and eating lunch.      Andrés Juares OTR/L

## 2018-05-16 ENCOUNTER — HOME HEALTH ADMISSION (OUTPATIENT)
Dept: HOME HEALTH SERVICES | Facility: HOME HEALTH | Age: 68
End: 2018-05-16
Payer: MEDICARE

## 2018-05-16 VITALS
TEMPERATURE: 98.4 F | BODY MASS INDEX: 41.04 KG/M2 | HEART RATE: 73 BPM | SYSTOLIC BLOOD PRESSURE: 125 MMHG | DIASTOLIC BLOOD PRESSURE: 64 MMHG | RESPIRATION RATE: 16 BRPM | HEIGHT: 62 IN | OXYGEN SATURATION: 96 % | WEIGHT: 223 LBS

## 2018-05-16 PROCEDURE — 77010033678 HC OXYGEN DAILY

## 2018-05-16 PROCEDURE — 74011000250 HC RX REV CODE- 250: Performed by: PHYSICAL MEDICINE & REHABILITATION

## 2018-05-16 PROCEDURE — 74011250637 HC RX REV CODE- 250/637: Performed by: PHYSICAL MEDICINE & REHABILITATION

## 2018-05-16 PROCEDURE — 97530 THERAPEUTIC ACTIVITIES: CPT

## 2018-05-16 PROCEDURE — 97150 GROUP THERAPEUTIC PROCEDURES: CPT

## 2018-05-16 PROCEDURE — 97110 THERAPEUTIC EXERCISES: CPT

## 2018-05-16 PROCEDURE — 99239 HOSP IP/OBS DSCHRG MGMT >30: CPT | Performed by: PHYSICAL MEDICINE & REHABILITATION

## 2018-05-16 PROCEDURE — 94640 AIRWAY INHALATION TREATMENT: CPT

## 2018-05-16 PROCEDURE — 97535 SELF CARE MNGMENT TRAINING: CPT

## 2018-05-16 PROCEDURE — 97116 GAIT TRAINING THERAPY: CPT

## 2018-05-16 RX ORDER — OXYCODONE HYDROCHLORIDE 5 MG/1
5-10 TABLET ORAL
Qty: 90 TAB | Refills: 0 | Status: SHIPPED | OUTPATIENT
Start: 2018-05-16 | End: 2018-08-21

## 2018-05-16 RX ADMIN — TIOTROPIUM BROMIDE 18 MCG: 18 CAPSULE ORAL; RESPIRATORY (INHALATION) at 05:29

## 2018-05-16 RX ADMIN — CARVEDILOL 3.12 MG: 3.12 TABLET, FILM COATED ORAL at 08:44

## 2018-05-16 RX ADMIN — OXYCODONE HYDROCHLORIDE 10 MG: 5 TABLET ORAL at 08:41

## 2018-05-16 RX ADMIN — ALBUTEROL SULFATE 2.5 MG: 2.5 SOLUTION RESPIRATORY (INHALATION) at 05:29

## 2018-05-16 RX ADMIN — POLYETHYLENE GLYCOL (3350) 17 G: 17 POWDER, FOR SOLUTION ORAL at 08:40

## 2018-05-16 RX ADMIN — FERROUS SULFATE TAB 325 MG (65 MG ELEMENTAL FE) 325 MG: 325 (65 FE) TAB at 08:44

## 2018-05-16 RX ADMIN — BUDESONIDE 500 MCG: 0.5 INHALANT RESPIRATORY (INHALATION) at 05:29

## 2018-05-16 RX ADMIN — SENNOSIDES 8.6 MG: 8.6 TABLET, FILM COATED ORAL at 08:45

## 2018-05-16 RX ADMIN — ESCITALOPRAM 10 MG: 10 TABLET, FILM COATED ORAL at 08:44

## 2018-05-16 RX ADMIN — FUROSEMIDE 60 MG: 40 TABLET ORAL at 08:43

## 2018-05-16 RX ADMIN — SACUBITRIL AND VALSARTAN 1 TABLET: 24; 26 TABLET, FILM COATED ORAL at 08:43

## 2018-05-16 NOTE — PROGRESS NOTES
Discharge instructions completed with patient. Follow up appointments and prescriptions given to patient. Patient shows verbal understanding of discharge instructions. No other verbalized needs made known upon discharge instructions.

## 2018-05-16 NOTE — PROGRESS NOTES
Patient resting up in bed. Alert and oriented. Lung sounds diminished in bases. Bi Pap with three liters. S1S2, bowel sounds active. No comp of pain at present time. Cast to left arm. Left orbit swollen. IV to left forearm intact and patent. No verbalized needs made known at present time. Plans for discharge today. Assessment completed. See doc flow sheet for further assessments.

## 2018-05-16 NOTE — PROGRESS NOTES
Problem: Mobility Impaired (Adult and Pediatric)  Goal: *Therapy Goal (Edit Goal, Insert Text)  LTG 4.  Patient ambulate 200 ft with SBA and assist to manage 02 tank demonstrating ability to keep 02 sat greater than 88% with improved breathing pattern in 10 days (5/9/18: Goal not met, patient progressing towards goal.)   Outcome: Not Met  Variance: Other (add note)  Comments: Patient unable to ambulate greater than 60 ft keeping 02 sat > 88%

## 2018-05-16 NOTE — PROGRESS NOTES
Problem: Self Care Deficits Care Plan (Adult)  Goal: *Therapy Goal (Edit Goal, Insert Text)  LTG 4: Patient will be minimal assist with donning a shirt within 10 days.   -Progressing 5/9/2018 Patient at moderate assist 5/16/2018          Outcome: Not Met  Variance: Patient Condition  Comments: Limited by RUE and Bilateral hands

## 2018-05-16 NOTE — PROGRESS NOTES
Problem: Falls - Risk of  Goal: *Absence of Falls  Document Tia Fall Risk and appropriate interventions in the flowsheet.    Outcome: Progressing Towards Goal  Fall Risk Interventions:  Mobility Interventions: Assess mobility with egress test, Communicate number of staff needed for ambulation/transfer, Patient to call before getting OOB, Utilize walker, cane, or other assitive device    Mentation Interventions: Evaluate medications/consider consulting pharmacy, Increase mobility    Medication Interventions: Evaluate medications/consider consulting pharmacy, Patient to call before getting OOB    Elimination Interventions: Call light in reach, Patient to call for help with toileting needs    History of Falls Interventions: Consult care management for discharge planning, Door open when patient unattended

## 2018-05-16 NOTE — PROGRESS NOTES
Problem: Falls - Risk of  Goal: *Absence of Falls  Document Tia Fall Risk and appropriate interventions in the flowsheet.    Outcome: Progressing Towards Goal  Fall Risk Interventions:  Mobility Interventions: Assess mobility with egress test, Communicate number of staff needed for ambulation/transfer, OT consult for ADLs, Patient to call before getting OOB, PT Consult for mobility concerns, PT Consult for assist device competence, Strengthening exercises (ROM-active/passive)    Mentation Interventions: Adequate sleep, hydration, pain control, Door open when patient unattended, Evaluate medications/consider consulting pharmacy, Increase mobility    Medication Interventions: Assess postural VS orthostatic hypotension, Evaluate medications/consider consulting pharmacy, Patient to call before getting OOB, Teach patient to arise slowly    Elimination Interventions: Call light in reach, Elevated toilet seat, Patient to call for help with toileting needs, Toilet paper/wipes in reach, Toileting schedule/hourly rounds    History of Falls Interventions: Consult care management for discharge planning, Door open when patient unattended, Evaluate medications/consider consulting pharmacy

## 2018-05-16 NOTE — PROGRESS NOTES
Problem: Mobility Impaired (Adult and Pediatric)  Goal: *Therapy Goal (Edit Goal, Insert Text)  LTG 3.  Patient ambulating 100ft independently on level surfaces demonstrating ability to manage 02 line in 10 days (5/9/18: Goal not met, patient progressing towards goal.)   Outcome: Not Met  Variance: Other (add note)  Comments: Unable to ambulate greater than 60ft keeping 02 sat > 88%

## 2018-05-16 NOTE — PROGRESS NOTES
PHYSICAL THERAPY DISCHARGE SUMMARY  TIME IN  841  TIME    Precautions at discharge: Other (comment) (fall precautions, NWB bilateral hands)    Problem List:    Decreased strength B LE  [x]     Decreased strength trunk/core  [x]     Decreased AROM   [x]     Decreased PROM  [x]     Decreased balance sitting  []     Decreased balance standing  [x]     Decreased endurance  [x]     Pain  [x]       Functional Limitations:   Decreased independence with bed mobility  [x]     Decreased independence with functional transfers  [x]     Decreased independence with ambulation  [x]     Decreased independence with stair negotiation  [x]            Outcome Measures: Vital Signs:patient on 02 at 3 lpm, resting 02 sat 95%, 02 sat 86% after ambulating 60ft, 02 sat improved to 89% after 2 minutes rest. 02 sat 85% after ambulating up 4 steps, 02 sat improved to 88% after 3 minutes rest  Patient Vitals for the past 12 hrs:   Temp Pulse Resp BP SpO2   05/16/18 0839 98.4 °F (36.9 °C) 73 16 125/64 96 %   05/16/18 0529 - - - - 94 %     Pain level: 2 to 5 out of 10  Pain location:both hands and right knee, right shoulder  Pain interventions:Pain medication per RN, rest, positioning,ROm for right knee    Patient education: fall precautions, activity pacing, body mechanics, energy conservation, LE HEP,  Managing 02 line, Patient verbalizing understanding and demonstrating understanding of patient education.  Recommend follow up education with [facility] PT        Interdisciplinary Communication:spoke with MSW regarding D/C plans       MMT Initial Asssessment   Right Lower Extremity Left Lower Extremity   Hip Flexion 4- 4   Knee Extension 4 5   Knee Flexion 4- 4+   Ankle Dorsiflexion 5 5      MMT Discharge Assessment   Right Lower Extremity Left Lower Extremity   Hip Flexion 4- 4   Knee Extension 5 5   Knee Flexion 4- 4+   Ankle Dorsiflexion 5 5   0/5 No palpable muscle contraction  1/5 Palpable muscle contraction, no joint movement  2-/5 Less than full range of motion in gravity eliminated position  2/5 Able to complete full range of motion in gravity eliminated position  2+/5 Able to initiate movement against gravity  3-/5 More than half but not full range of motion against gravity  3/5 Able to complete full range of motion against gravity  3+/5 Completes full range of motion against gravity with minimal resistance  4-/5 Completes full range of motion against gravity with minimal-moderate resistance  4/5 Completes full range of motion against gravity with moderate resistance  4+/5 Completes full range of motion against gravity with moderate-maximum resistance  5/5 Completes full range of motion against gravity with maximum resistance     AROM:hip flexion 75, ABD 20    FIM SCORES Initial Assessment Discharge Assessment   Bed/Chair/Wheelchair Transfers 3 6   Wheelchair Mobility 0 0   Walking [place] 2 5   Steps/Stairs 0 2   PRIMARY MODE OF LOCOMOTION: ambulation  Please see IRC Interdisciplinary Eval: Coordination/Balance Section for details regarding FIM score description.     BED/CHAIR/WHEELCHAIR TRANSFERS Initial Assessment Discharge Assessment   Rolling Right 4 (Minimal assistance) 6 (Modified independent)   Rolling Left 4 (Minimal assistance) 6 (Modified independent)   Supine to Sit 4 (Minimal assistance) 6 (Modified independent)   Sit to Stand Moderate assistance     Sit to Supine 4 (Minimal assistance) 6 (Modified independent)   Transfer Assist Score 3 6   Transfer Type SPT without device SPT without device   Comments Lifting A Increased time and effort to complete with altered body mechanics secondary to inability to utilize hands to assist with bed mobility and transfers   Car Transfer Not tested  (assessed 05/15/18 during family training, Min assist)   Car Type rehab car         [facility] MOBILITY/MANAGEMENT Initial Assessment Discharge Assessment   Able to Propel 0 feet (Unable to effectively propell with UEs.) 0 feet   Functional Level 0 0 Curbs/ramps assistance required 0 (Not tested) 0 (Not tested)   Wheelchair set up assistance required 0 (Not tested) 0 (Not tested)   Wheelchair management           WALKING INDEPENDENCE Initial Assessment Discharge Assessment   Assistive device Gait belt  (No device)   Ambulation assistance - level surface 4 (Contact guard assistance) 6 (Modified independent)   Distance 75 Feet (ft) 60 Feet (ft)   Functional Level 2 5   Comments slow cont step through gait with mild decrease in ankle DF in initial contact, mild one time ataxia with deviation to left with patient able to correct slow cont step through gait with increased base of support and decreased step length and ankle DF at initial contact. Demonstrating improved ability to control respiratory rate with gait speed. Ambulation assistance - unlevel surface 0 (Not tested) 5 (Stand-by assistance) (up/down 10 ft ramp)       STEPS/STAIRS Initial Assessment Discharge Assessment   Steps/Stairs ambulated 0 4   Rail Use  (None) None   Functional Level 0 2   Comments Unable to ambulate up/down steps at this time due to inability to use UEs on hand rails and limited flexion strength and decreased balance Increased time and effort to complete with 5 min sitting rest break after going up steps. Single step at a time leading up with LLE and down with RLE.  Side stepping pattern going down steps   Curbs/Ramps 0 (Not tested) 5 (Stand-by assistance) (up/down 10 ft ramp )     QUALITY INDICATOR ASSIST COMMENTS   Walk 10 feet Modified independent with 02 at 3 lpm    Walk 50 feet with 2 turns Modified independent with 02 at 3 lpm    Walk 150 feet Unable to ambulate greater than 60 ft due to 02 sat decrease below 88%    Walk 10 feet on uneven  SBA up/down 10 ft ramp    1 step/curb Min assist    4 steps Min assist    12 steps Unable secondary to decrease endurance and 02 sat less than 88% after 4 steps     object SBA    Wheel 50' w/2 turns Unable to propel w/c secondary to restrictions with hand fractures    Wheel 150' Unable to propel w/c secondary to restrictions with hand fractures             PHYSICAL THERAPY PLAN OF CARE    LTGs: patient met 4 out of 6 goals per eval and reassessment. Refer to care plan for details    Pt would benefit from continued skilled physical therapy in order to improve independent functional mobility within the home with use of least restrictive device. Interventions may include range of motion (AROM, PROM B LE/trunk), motor function (B LE/trunk strengthening/coordination), activity tolerance (vitals, oxygen saturation levels), bed mobility training, balance activities, gait training (progressive ambulation program), and functional transfer training. HEP handout:Issued written LE HEP. Able to complete with min assist  SEATED EXERCISES Sets Reps Comments   Ankle Pumps 1 20    Hip Flexion 2 10    Long Arc Quads 2 10    Hip Adduction/Ball Squeeze 1 20    Hip Abduction with red Theraband 1 20    Hamstring Curls with red Theraband 2 10    Hip Extension with red Theraband 2 10        Pt to be discharged 05/16/18 with assistance provided by family and [facility].   family training completed 05/15/18 with patient and her 2 daughters  Therapy Recommendations upon discharge: [address] needs at discharge: Patient owns recommended DME, wheelchair      Please see IRC; Interdisciplinary Eval, Care Plan, and Patient Education for further information regarding physical therapy discharge summary and plan of care. Patient returned to room at end of treatment and remained up in recliner with LEs elevated and with needs in reach.      Aliyah Murray, PT  5/16/2018

## 2018-05-16 NOTE — PROGRESS NOTES
[address]  Face to Face Encounter    Patients Name: Chris Pryor    YOB: 1950    Ordering Physician: Ronnie Ray MD    Primary Diagnosis: Debillity   Physical debility    Date of Face to Face:   5/16/2018                                  Face to Face Encounter findings are related to primary reason for home care:   yes. 1. I certify that the patient needs intermittent care as follows: skilled nursing care:  skilled observation/assessment, patient education and therapeutic drug monitoring  physical therapy: strengthening, transfer training, gait/stair training, balance training and pt/caregiver education  occupational therapy:  ADL safety (ie. cooking, bathing, dressing) and pt/caregiver education    2. I certify that this patient is homebound, that is: 1) patient requires the use of a wheelchair device, special transportation, or assistance of another to leave the home; or 2) patient's condition makes leaving the home medically contraindicated; and 3) patient has a normal inability to leave the home and leaving the home requires considerable and taxing effort. Patient may leave the home for infrequent and short duration for medical reasons, and occasional absences for non-medical reasons. Homebound status is due to the following functional limitations: Patient currently under activity restrictions secondary to recent surgical procedure, this hinders their ability to safely leave the home. Patient with strength deficits limiting the performance of all ADL's without caregiver assistance or the use of an assistive device. Patient with rapid oxygen desaturation with all exertional activity and requires frequent seated rest breaks to allow for oxygen recovery. Patient with increased shortness of breath with ambulation greater than 60 feet limiting their ability to ambulate safely in the community.     3. I certify that this patient is under my care and that I, or a nurse practitioner or  [id number], or clinical nurse specialist, or certified nurse midwife, working with me, had a Face-to-Face Encounter that meets the physician Face-to-Face Encounter requirements. The following are the clinical findings from the [address] [place] [address] encounter that support the need for skilled services and is a summary of the encounter: See medical record    See attached progess note      Tristan Etienne, NUVIA  5/16/2018      THE FOLLOWING TO BE COMPLETED BY THE COMMUNITY PHYSICIAN:    I concur with the findings described above from the F2F encounter that this patient is homebound and in need of a skilled service.     Certifying Physician: _____________________________________      Printed Certifying Physician Name: _____________________________________    Date: _________________

## 2018-05-16 NOTE — PROGRESS NOTES
End Of Shift Functional Summary, Nursing      TOILETING:    Does patient need assist with adjusting pants up or down and/or pericare? yes  If yes, please indicate what the patient needs help with:  Aura care  (i.e. pants up, pants down, pericare)  Pt uses walker. Does the patient require extra time? yes  Does the patient require standby assistance? yes  Does the patient require contact guard assistance? yes  Does the patient require more than one staff member for assistance? no    TOILET TRANSFER:    Pt requires minimal assistance. Pt uses walker. Does the patient require extra time? yes  Does the patient require contact guard assistance? yes  Does the patient require more than one staff member for assistance? no    BLADDER:    Pt does not have a medina catheter that staff manages. Pt does not take medication. If so, please indicate which medication:    Pt is continent. of bladder and voids in bedside commode  Pt requires staff to empty device Pt has had 0 bladder accidents during this shift .  (An accident is when the episode is not contained in a brief AND/OR the clothing/linen requires changing/cleaning up.)      BED/CHAIR TRANSFER  Pt requires minimal assistance. Pt uses walker  Does the patient require extra time? yes  Does the patient require contact guard assistance? yes  Does the patient require more than one staff member for assistance? no      Documentation reviewed and plan of care discussed/reviewed with   patient and patient assistant during the shift.

## 2018-05-16 NOTE — PROGRESS NOTES
Care Management Interventions  PCP Verified by CM: Yes  Mode of Transport at Discharge: Other (see comment) (family car)  Transition of Care Consult (CM Consult): Home Health (PT, OT and RN)  [address] Yes  Discharge Durable Medical Equipment: No  Physical Therapy Consult: Yes  Occupational Therapy Consult: Yes  Speech Therapy Consult: Yes  Current Support Network: Own Home, Family Lives Nearby, Lives Alone  Confirm Follow Up Transport: Family  Plan discussed with Pt/Family/Caregiver: Yes  Freedom of Choice Offered: Yes  Discharge Location  Discharge Placement: Home with home health [facility])    Pt to discharge to home today with [facility] PT, OT and RN. Referral made to [facility]. Info in AVS. F2F completed. Dtrs and CLTC aide will be available to assist family at [place]. Contacted Ms Emmie You  to confirm patient's discharge today. Aide services to start tomorrow. Ms Stanley Yip is requesting extension of pt's aide hours. She will discuss with patient.

## 2018-05-17 ENCOUNTER — HOME CARE VISIT (OUTPATIENT)
Dept: SCHEDULING | Facility: HOME HEALTH | Age: 68
End: 2018-05-17
Payer: MEDICARE

## 2018-05-17 VITALS
SYSTOLIC BLOOD PRESSURE: 118 MMHG | RESPIRATION RATE: 18 BRPM | TEMPERATURE: 97.9 F | OXYGEN SATURATION: 95 % | DIASTOLIC BLOOD PRESSURE: 62 MMHG | HEART RATE: 72 BPM

## 2018-05-17 LAB
INR BLD: 2.2 (ref 0.9–1.1)
PT POC: 26.9 SECONDS (ref 11.8–14.9)

## 2018-05-17 PROCEDURE — 400013 HH SOC

## 2018-05-17 PROCEDURE — 3331090002 HH PPS REVENUE DEBIT

## 2018-05-17 PROCEDURE — G0299 HHS/HOSPICE OF RN EA 15 MIN: HCPCS

## 2018-05-17 PROCEDURE — 3331090001 HH PPS REVENUE CREDIT

## 2018-05-17 NOTE — PROGRESS NOTES
Pt D/C summary completed on 5/17/18. Please see for specifics in [address] Patient d/c'd to home on 5/16/18.   Donna Nuñez, CTRS

## 2018-05-18 ENCOUNTER — HOSPITAL ENCOUNTER (OUTPATIENT)
Dept: INFUSION THERAPY | Age: 68
End: 2018-05-18

## 2018-05-18 ENCOUNTER — HOME CARE VISIT (OUTPATIENT)
Dept: SCHEDULING | Facility: HOME HEALTH | Age: 68
End: 2018-05-18
Payer: MEDICARE

## 2018-05-18 VITALS
RESPIRATION RATE: 20 BRPM | DIASTOLIC BLOOD PRESSURE: 85 MMHG | OXYGEN SATURATION: 98 % | TEMPERATURE: 97.8 F | HEART RATE: 66 BPM | SYSTOLIC BLOOD PRESSURE: 135 MMHG

## 2018-05-18 PROCEDURE — 3331090001 HH PPS REVENUE CREDIT

## 2018-05-18 PROCEDURE — G0152 HHCP-SERV OF OT,EA 15 MIN: HCPCS

## 2018-05-18 PROCEDURE — 3331090002 HH PPS REVENUE DEBIT

## 2018-05-19 PROCEDURE — 3331090002 HH PPS REVENUE DEBIT

## 2018-05-19 PROCEDURE — 3331090001 HH PPS REVENUE CREDIT

## 2018-05-20 PROCEDURE — 3331090001 HH PPS REVENUE CREDIT

## 2018-05-20 PROCEDURE — 3331090002 HH PPS REVENUE DEBIT

## 2018-05-21 ENCOUNTER — HOME CARE VISIT (OUTPATIENT)
Dept: SCHEDULING | Facility: HOME HEALTH | Age: 68
End: 2018-05-21
Payer: MEDICARE

## 2018-05-21 VITALS
RESPIRATION RATE: 17 BRPM | DIASTOLIC BLOOD PRESSURE: 70 MMHG | SYSTOLIC BLOOD PRESSURE: 126 MMHG | HEART RATE: 72 BPM | OXYGEN SATURATION: 96 %

## 2018-05-21 PROCEDURE — 3331090001 HH PPS REVENUE CREDIT

## 2018-05-21 PROCEDURE — G0151 HHCP-SERV OF PT,EA 15 MIN: HCPCS

## 2018-05-21 PROCEDURE — 3331090002 HH PPS REVENUE DEBIT

## 2018-05-22 PROCEDURE — 3331090002 HH PPS REVENUE DEBIT

## 2018-05-22 PROCEDURE — 3331090001 HH PPS REVENUE CREDIT

## 2018-05-23 ENCOUNTER — HOME CARE VISIT (OUTPATIENT)
Dept: SCHEDULING | Facility: HOME HEALTH | Age: 68
End: 2018-05-23
Payer: MEDICARE

## 2018-05-23 VITALS
TEMPERATURE: 98.2 F | HEART RATE: 68 BPM | RESPIRATION RATE: 17 BRPM | SYSTOLIC BLOOD PRESSURE: 122 MMHG | DIASTOLIC BLOOD PRESSURE: 78 MMHG | OXYGEN SATURATION: 93 %

## 2018-05-23 VITALS
SYSTOLIC BLOOD PRESSURE: 122 MMHG | DIASTOLIC BLOOD PRESSURE: 78 MMHG | HEART RATE: 68 BPM | OXYGEN SATURATION: 93 % | TEMPERATURE: 98.2 F | RESPIRATION RATE: 17 BRPM

## 2018-05-23 PROCEDURE — 3331090002 HH PPS REVENUE DEBIT

## 2018-05-23 PROCEDURE — G0157 HHC PT ASSISTANT EA 15: HCPCS

## 2018-05-23 PROCEDURE — G0152 HHCP-SERV OF OT,EA 15 MIN: HCPCS

## 2018-05-23 PROCEDURE — 3331090001 HH PPS REVENUE CREDIT

## 2018-05-24 ENCOUNTER — HOME CARE VISIT (OUTPATIENT)
Dept: SCHEDULING | Facility: HOME HEALTH | Age: 68
End: 2018-05-24
Payer: MEDICARE

## 2018-05-24 VITALS
DIASTOLIC BLOOD PRESSURE: 72 MMHG | OXYGEN SATURATION: 96 % | SYSTOLIC BLOOD PRESSURE: 142 MMHG | HEART RATE: 80 BPM | TEMPERATURE: 97.9 F | RESPIRATION RATE: 17 BRPM

## 2018-05-24 PROCEDURE — G0158 HHC OT ASSISTANT EA 15: HCPCS

## 2018-05-24 PROCEDURE — G0299 HHS/HOSPICE OF RN EA 15 MIN: HCPCS

## 2018-05-24 PROCEDURE — 3331090001 HH PPS REVENUE CREDIT

## 2018-05-24 PROCEDURE — 3331090002 HH PPS REVENUE DEBIT

## 2018-05-25 VITALS
SYSTOLIC BLOOD PRESSURE: 142 MMHG | DIASTOLIC BLOOD PRESSURE: 70 MMHG | RESPIRATION RATE: 17 BRPM | OXYGEN SATURATION: 98 % | HEART RATE: 80 BPM | TEMPERATURE: 97.8 F

## 2018-05-25 PROCEDURE — 3331090001 HH PPS REVENUE CREDIT

## 2018-05-25 PROCEDURE — 3331090002 HH PPS REVENUE DEBIT

## 2018-05-26 PROCEDURE — 3331090002 HH PPS REVENUE DEBIT

## 2018-05-26 PROCEDURE — 3331090001 HH PPS REVENUE CREDIT

## 2018-05-27 PROCEDURE — 3331090001 HH PPS REVENUE CREDIT

## 2018-05-27 PROCEDURE — 3331090002 HH PPS REVENUE DEBIT

## 2018-05-28 PROCEDURE — 3331090002 HH PPS REVENUE DEBIT

## 2018-05-28 PROCEDURE — 3331090001 HH PPS REVENUE CREDIT

## 2018-05-29 ENCOUNTER — HOME CARE VISIT (OUTPATIENT)
Dept: HOME HEALTH SERVICES | Facility: HOME HEALTH | Age: 68
End: 2018-05-29
Payer: MEDICARE

## 2018-05-29 PROCEDURE — 3331090001 HH PPS REVENUE CREDIT

## 2018-05-29 PROCEDURE — 3331090002 HH PPS REVENUE DEBIT

## 2018-05-30 ENCOUNTER — HOME CARE VISIT (OUTPATIENT)
Dept: SCHEDULING | Facility: HOME HEALTH | Age: 68
End: 2018-05-30
Payer: MEDICARE

## 2018-05-30 VITALS
RESPIRATION RATE: 19 BRPM | HEART RATE: 75 BPM | SYSTOLIC BLOOD PRESSURE: 118 MMHG | OXYGEN SATURATION: 98 % | DIASTOLIC BLOOD PRESSURE: 82 MMHG | TEMPERATURE: 98 F

## 2018-05-30 PROCEDURE — 3331090002 HH PPS REVENUE DEBIT

## 2018-05-30 PROCEDURE — 3331090001 HH PPS REVENUE CREDIT

## 2018-05-30 PROCEDURE — G0158 HHC OT ASSISTANT EA 15: HCPCS

## 2018-05-31 PROCEDURE — 3331090002 HH PPS REVENUE DEBIT

## 2018-05-31 PROCEDURE — 3331090001 HH PPS REVENUE CREDIT

## 2018-06-01 ENCOUNTER — HOME CARE VISIT (OUTPATIENT)
Dept: SCHEDULING | Facility: HOME HEALTH | Age: 68
End: 2018-06-01
Payer: MEDICARE

## 2018-06-01 VITALS
DIASTOLIC BLOOD PRESSURE: 80 MMHG | TEMPERATURE: 97.2 F | RESPIRATION RATE: 18 BRPM | OXYGEN SATURATION: 92 % | SYSTOLIC BLOOD PRESSURE: 124 MMHG | HEART RATE: 82 BPM

## 2018-06-01 PROCEDURE — G0152 HHCP-SERV OF OT,EA 15 MIN: HCPCS

## 2018-06-01 PROCEDURE — G0157 HHC PT ASSISTANT EA 15: HCPCS

## 2018-06-01 PROCEDURE — 3331090001 HH PPS REVENUE CREDIT

## 2018-06-01 PROCEDURE — 3331090002 HH PPS REVENUE DEBIT

## 2018-06-02 PROCEDURE — 3331090001 HH PPS REVENUE CREDIT

## 2018-06-02 PROCEDURE — 3331090002 HH PPS REVENUE DEBIT

## 2018-06-03 VITALS
OXYGEN SATURATION: 92 % | TEMPERATURE: 98.1 F | DIASTOLIC BLOOD PRESSURE: 80 MMHG | SYSTOLIC BLOOD PRESSURE: 124 MMHG | RESPIRATION RATE: 18 BRPM | HEART RATE: 82 BPM

## 2018-06-03 PROCEDURE — 3331090001 HH PPS REVENUE CREDIT

## 2018-06-03 PROCEDURE — 3331090002 HH PPS REVENUE DEBIT

## 2018-06-04 ENCOUNTER — HOME CARE VISIT (OUTPATIENT)
Dept: SCHEDULING | Facility: HOME HEALTH | Age: 68
End: 2018-06-04
Payer: MEDICARE

## 2018-06-04 VITALS
SYSTOLIC BLOOD PRESSURE: 128 MMHG | DIASTOLIC BLOOD PRESSURE: 84 MMHG | TEMPERATURE: 97.6 F | RESPIRATION RATE: 19 BRPM | HEART RATE: 75 BPM | OXYGEN SATURATION: 95 %

## 2018-06-04 VITALS
SYSTOLIC BLOOD PRESSURE: 122 MMHG | RESPIRATION RATE: 18 BRPM | OXYGEN SATURATION: 95 % | HEART RATE: 74 BPM | TEMPERATURE: 98.3 F | DIASTOLIC BLOOD PRESSURE: 78 MMHG

## 2018-06-04 PROCEDURE — G0158 HHC OT ASSISTANT EA 15: HCPCS

## 2018-06-04 PROCEDURE — 3331090002 HH PPS REVENUE DEBIT

## 2018-06-04 PROCEDURE — 3331090001 HH PPS REVENUE CREDIT

## 2018-06-04 PROCEDURE — G0299 HHS/HOSPICE OF RN EA 15 MIN: HCPCS

## 2018-06-05 ENCOUNTER — HOME CARE VISIT (OUTPATIENT)
Dept: SCHEDULING | Facility: HOME HEALTH | Age: 68
End: 2018-06-05
Payer: MEDICARE

## 2018-06-05 VITALS
RESPIRATION RATE: 17 BRPM | HEART RATE: 72 BPM | TEMPERATURE: 98 F | SYSTOLIC BLOOD PRESSURE: 130 MMHG | DIASTOLIC BLOOD PRESSURE: 82 MMHG | OXYGEN SATURATION: 96 %

## 2018-06-05 PROCEDURE — 3331090002 HH PPS REVENUE DEBIT

## 2018-06-05 PROCEDURE — G0157 HHC PT ASSISTANT EA 15: HCPCS

## 2018-06-05 PROCEDURE — 3331090001 HH PPS REVENUE CREDIT

## 2018-06-06 ENCOUNTER — HOME CARE VISIT (OUTPATIENT)
Dept: SCHEDULING | Facility: HOME HEALTH | Age: 68
End: 2018-06-06
Payer: MEDICARE

## 2018-06-06 VITALS
HEART RATE: 74 BPM | DIASTOLIC BLOOD PRESSURE: 84 MMHG | RESPIRATION RATE: 19 BRPM | SYSTOLIC BLOOD PRESSURE: 138 MMHG | TEMPERATURE: 97.5 F | OXYGEN SATURATION: 97 %

## 2018-06-06 PROCEDURE — 3331090001 HH PPS REVENUE CREDIT

## 2018-06-06 PROCEDURE — G0158 HHC OT ASSISTANT EA 15: HCPCS

## 2018-06-06 PROCEDURE — 3331090002 HH PPS REVENUE DEBIT

## 2018-06-07 ENCOUNTER — HOME CARE VISIT (OUTPATIENT)
Dept: SCHEDULING | Facility: HOME HEALTH | Age: 68
End: 2018-06-07
Payer: MEDICARE

## 2018-06-07 VITALS
SYSTOLIC BLOOD PRESSURE: 118 MMHG | OXYGEN SATURATION: 93 % | HEART RATE: 76 BPM | DIASTOLIC BLOOD PRESSURE: 78 MMHG | TEMPERATURE: 97.8 F | RESPIRATION RATE: 17 BRPM

## 2018-06-07 PROCEDURE — 3331090001 HH PPS REVENUE CREDIT

## 2018-06-07 PROCEDURE — 3331090002 HH PPS REVENUE DEBIT

## 2018-06-07 PROCEDURE — G0157 HHC PT ASSISTANT EA 15: HCPCS

## 2018-06-08 ENCOUNTER — HOSPITAL ENCOUNTER (OUTPATIENT)
Dept: LAB | Age: 68
Discharge: HOME | End: 2018-06-08
Payer: MEDICARE

## 2018-06-08 ENCOUNTER — HOSPITAL ENCOUNTER (OUTPATIENT)
Dept: INFUSION THERAPY | Age: 68
Discharge: HOME | End: 2018-06-08
Payer: MEDICARE

## 2018-06-08 DIAGNOSIS — D46.9 MDS (MYELODYSPLASTIC SYNDROME) (HCC): Chronic | ICD-10-CM

## 2018-06-08 DIAGNOSIS — D46.9 MDS (MYELODYSPLASTIC SYNDROME) (HCC): ICD-10-CM

## 2018-06-08 DIAGNOSIS — D50.0 IRON DEFICIENCY ANEMIA DUE TO CHRONIC BLOOD LOSS: ICD-10-CM

## 2018-06-08 PROBLEM — E66.01 OBESITY, MORBID (HCC): Status: ACTIVE | Noted: 2018-06-08

## 2018-06-08 LAB
ALBUMIN SERPL-MCNC: 3.7 G/DL (ref 3.2–4.6)
ALBUMIN/GLOB SERPL: 0.9 {RATIO} (ref 1.2–3.5)
ALP SERPL-CCNC: 59 U/L (ref 50–136)
ALT SERPL-CCNC: 16 U/L (ref 12–65)
ANION GAP SERPL CALC-SCNC: 3 MMOL/L (ref 7–16)
AST SERPL-CCNC: 17 U/L (ref 15–37)
BASOPHILS # BLD: 0 K/UL (ref 0–0.2)
BASOPHILS NFR BLD: 0 % (ref 0–2)
BILIRUB SERPL-MCNC: 0.6 MG/DL (ref 0.2–1.1)
BUN SERPL-MCNC: 37 MG/DL (ref 8–23)
CALCIUM SERPL-MCNC: 9.8 MG/DL (ref 8.3–10.4)
CHLORIDE SERPL-SCNC: 94 MMOL/L (ref 98–107)
CO2 SERPL-SCNC: 42 MMOL/L (ref 21–32)
CREAT SERPL-MCNC: 1.36 MG/DL (ref 0.6–1)
DIFFERENTIAL METHOD BLD: ABNORMAL
EOSINOPHIL # BLD: 0.1 K/UL (ref 0–0.8)
EOSINOPHIL NFR BLD: 2 % (ref 0.5–7.8)
ERYTHROCYTE [DISTWIDTH] IN BLOOD BY AUTOMATED COUNT: 14 % (ref 11.9–14.6)
FERRITIN SERPL-MCNC: 54 NG/ML (ref 8–388)
GLOBULIN SER CALC-MCNC: 4.1 G/DL (ref 2.3–3.5)
GLUCOSE SERPL-MCNC: 122 MG/DL (ref 65–100)
HCT VFR BLD AUTO: 28.7 % (ref 35.8–46.3)
HGB BLD-MCNC: 8.9 G/DL (ref 11.7–15.4)
IRON SATN MFR SERPL: 22 %
IRON SERPL-MCNC: 65 UG/DL (ref 35–150)
LYMPHOCYTES # BLD: 1.4 K/UL (ref 0.5–4.6)
LYMPHOCYTES NFR BLD: 21 % (ref 13–44)
MCH RBC QN AUTO: 29.1 PG (ref 26.1–32.9)
MCHC RBC AUTO-ENTMCNC: 31 G/DL (ref 31.4–35)
MCV RBC AUTO: 93.8 FL (ref 79.6–97.8)
MONOCYTES # BLD: 0.5 K/UL (ref 0.1–1.3)
MONOCYTES NFR BLD: 8 % (ref 4–12)
NEUTS SEG # BLD: 4.7 K/UL (ref 1.7–8.2)
NEUTS SEG NFR BLD: 69 % (ref 43–78)
NRBC # BLD: 0 K/UL (ref 0–0.2)
PLATELET # BLD AUTO: 158 K/UL (ref 150–450)
PMV BLD AUTO: 10.6 FL (ref 10.8–14.1)
POTASSIUM SERPL-SCNC: 4.1 MMOL/L (ref 3.5–5.1)
PROT SERPL-MCNC: 7.8 G/DL (ref 6.3–8.2)
RBC # BLD AUTO: 3.06 M/UL (ref 4.05–5.25)
SODIUM SERPL-SCNC: 139 MMOL/L (ref 136–145)
TIBC SERPL-MCNC: 291 UG/DL (ref 250–450)
WBC # BLD AUTO: 6.8 K/UL (ref 4.3–11.1)

## 2018-06-08 PROCEDURE — 74011250636 HC RX REV CODE- 250/636: Performed by: INTERNAL MEDICINE

## 2018-06-08 PROCEDURE — 80053 COMPREHEN METABOLIC PANEL: CPT | Performed by: INTERNAL MEDICINE

## 2018-06-08 PROCEDURE — 82728 ASSAY OF FERRITIN: CPT | Performed by: INTERNAL MEDICINE

## 2018-06-08 PROCEDURE — 83540 ASSAY OF IRON: CPT | Performed by: INTERNAL MEDICINE

## 2018-06-08 PROCEDURE — 3331090002 HH PPS REVENUE DEBIT

## 2018-06-08 PROCEDURE — 3331090001 HH PPS REVENUE CREDIT

## 2018-06-08 PROCEDURE — 36415 COLL VENOUS BLD VENIPUNCTURE: CPT | Performed by: INTERNAL MEDICINE

## 2018-06-08 PROCEDURE — 85025 COMPLETE CBC W/AUTO DIFF WBC: CPT | Performed by: INTERNAL MEDICINE

## 2018-06-08 PROCEDURE — 96372 THER/PROPH/DIAG INJ SC/IM: CPT

## 2018-06-08 RX ADMIN — ERYTHROPOIETIN 40000 UNITS: 40000 INJECTION, SOLUTION INTRAVENOUS; SUBCUTANEOUS at 12:07

## 2018-06-09 PROCEDURE — 3331090002 HH PPS REVENUE DEBIT

## 2018-06-09 PROCEDURE — 3331090001 HH PPS REVENUE CREDIT

## 2018-06-10 PROCEDURE — 3331090001 HH PPS REVENUE CREDIT

## 2018-06-10 PROCEDURE — 3331090002 HH PPS REVENUE DEBIT

## 2018-06-11 ENCOUNTER — HOME CARE VISIT (OUTPATIENT)
Dept: SCHEDULING | Facility: HOME HEALTH | Age: 68
End: 2018-06-11
Payer: MEDICARE

## 2018-06-11 VITALS
RESPIRATION RATE: 19 BRPM | TEMPERATURE: 97.8 F | OXYGEN SATURATION: 95 % | DIASTOLIC BLOOD PRESSURE: 80 MMHG | HEART RATE: 80 BPM | SYSTOLIC BLOOD PRESSURE: 132 MMHG

## 2018-06-11 VITALS
TEMPERATURE: 98.1 F | DIASTOLIC BLOOD PRESSURE: 62 MMHG | RESPIRATION RATE: 17 BRPM | SYSTOLIC BLOOD PRESSURE: 120 MMHG | HEART RATE: 78 BPM | OXYGEN SATURATION: 96 %

## 2018-06-11 PROCEDURE — G0299 HHS/HOSPICE OF RN EA 15 MIN: HCPCS

## 2018-06-11 PROCEDURE — 3331090001 HH PPS REVENUE CREDIT

## 2018-06-11 PROCEDURE — 3331090002 HH PPS REVENUE DEBIT

## 2018-06-11 PROCEDURE — G0158 HHC OT ASSISTANT EA 15: HCPCS

## 2018-06-12 PROCEDURE — 3331090001 HH PPS REVENUE CREDIT

## 2018-06-12 PROCEDURE — 3331090002 HH PPS REVENUE DEBIT

## 2018-06-13 PROCEDURE — 3331090002 HH PPS REVENUE DEBIT

## 2018-06-13 PROCEDURE — 3331090001 HH PPS REVENUE CREDIT

## 2018-06-14 PROCEDURE — 3331090001 HH PPS REVENUE CREDIT

## 2018-06-14 PROCEDURE — 3331090002 HH PPS REVENUE DEBIT

## 2018-06-15 ENCOUNTER — HOME CARE VISIT (OUTPATIENT)
Dept: SCHEDULING | Facility: HOME HEALTH | Age: 68
End: 2018-06-15
Payer: MEDICARE

## 2018-06-15 VITALS
SYSTOLIC BLOOD PRESSURE: 110 MMHG | RESPIRATION RATE: 18 BRPM | DIASTOLIC BLOOD PRESSURE: 72 MMHG | OXYGEN SATURATION: 97 % | HEART RATE: 67 BPM | TEMPERATURE: 98.3 F

## 2018-06-15 VITALS
DIASTOLIC BLOOD PRESSURE: 82 MMHG | SYSTOLIC BLOOD PRESSURE: 130 MMHG | RESPIRATION RATE: 19 BRPM | TEMPERATURE: 98.3 F | OXYGEN SATURATION: 94 % | HEART RATE: 66 BPM

## 2018-06-15 PROCEDURE — G0152 HHCP-SERV OF OT,EA 15 MIN: HCPCS

## 2018-06-15 PROCEDURE — 3331090001 HH PPS REVENUE CREDIT

## 2018-06-15 PROCEDURE — G0151 HHCP-SERV OF PT,EA 15 MIN: HCPCS

## 2018-06-15 PROCEDURE — 3331090002 HH PPS REVENUE DEBIT

## 2018-06-16 PROCEDURE — 3331090002 HH PPS REVENUE DEBIT

## 2018-06-16 PROCEDURE — 3331090001 HH PPS REVENUE CREDIT

## 2018-06-17 PROCEDURE — 3331090002 HH PPS REVENUE DEBIT

## 2018-06-17 PROCEDURE — 3331090001 HH PPS REVENUE CREDIT

## 2018-06-18 ENCOUNTER — HOME CARE VISIT (OUTPATIENT)
Dept: SCHEDULING | Facility: HOME HEALTH | Age: 68
End: 2018-06-18
Payer: MEDICARE

## 2018-06-18 VITALS
TEMPERATURE: 97.5 F | SYSTOLIC BLOOD PRESSURE: 116 MMHG | OXYGEN SATURATION: 96 % | DIASTOLIC BLOOD PRESSURE: 67 MMHG | HEART RATE: 76 BPM | RESPIRATION RATE: 18 BRPM

## 2018-06-18 LAB
INR BLD: 3 (ref 0.9–1.1)
PT POC: 36 SECONDS (ref 11.8–14.9)

## 2018-06-18 PROCEDURE — 3331090001 HH PPS REVENUE CREDIT

## 2018-06-18 PROCEDURE — 3331090002 HH PPS REVENUE DEBIT

## 2018-06-18 PROCEDURE — G0299 HHS/HOSPICE OF RN EA 15 MIN: HCPCS

## 2018-06-19 ENCOUNTER — HOME CARE VISIT (OUTPATIENT)
Dept: SCHEDULING | Facility: HOME HEALTH | Age: 68
End: 2018-06-19
Payer: MEDICARE

## 2018-06-19 VITALS
OXYGEN SATURATION: 94 % | DIASTOLIC BLOOD PRESSURE: 84 MMHG | TEMPERATURE: 97.6 F | SYSTOLIC BLOOD PRESSURE: 138 MMHG | RESPIRATION RATE: 19 BRPM | HEART RATE: 70 BPM

## 2018-06-19 PROCEDURE — 3331090002 HH PPS REVENUE DEBIT

## 2018-06-19 PROCEDURE — G0158 HHC OT ASSISTANT EA 15: HCPCS

## 2018-06-19 PROCEDURE — 3331090001 HH PPS REVENUE CREDIT

## 2018-06-20 PROCEDURE — 3331090001 HH PPS REVENUE CREDIT

## 2018-06-20 PROCEDURE — 3331090002 HH PPS REVENUE DEBIT

## 2018-06-21 ENCOUNTER — HOME CARE VISIT (OUTPATIENT)
Dept: SCHEDULING | Facility: HOME HEALTH | Age: 68
End: 2018-06-21
Payer: MEDICARE

## 2018-06-21 VITALS
SYSTOLIC BLOOD PRESSURE: 132 MMHG | DIASTOLIC BLOOD PRESSURE: 74 MMHG | RESPIRATION RATE: 18 BRPM | OXYGEN SATURATION: 97 % | TEMPERATURE: 97.9 F | HEART RATE: 68 BPM

## 2018-06-21 PROCEDURE — G0158 HHC OT ASSISTANT EA 15: HCPCS

## 2018-06-21 PROCEDURE — 3331090001 HH PPS REVENUE CREDIT

## 2018-06-21 PROCEDURE — 3331090002 HH PPS REVENUE DEBIT

## 2018-06-22 PROCEDURE — 3331090001 HH PPS REVENUE CREDIT

## 2018-06-22 PROCEDURE — 3331090002 HH PPS REVENUE DEBIT

## 2018-06-23 PROCEDURE — 3331090002 HH PPS REVENUE DEBIT

## 2018-06-23 PROCEDURE — 3331090001 HH PPS REVENUE CREDIT

## 2018-06-24 PROCEDURE — 3331090002 HH PPS REVENUE DEBIT

## 2018-06-24 PROCEDURE — 3331090001 HH PPS REVENUE CREDIT

## 2018-06-25 PROCEDURE — 3331090002 HH PPS REVENUE DEBIT

## 2018-06-25 PROCEDURE — 3331090001 HH PPS REVENUE CREDIT

## 2018-06-26 PROCEDURE — 3331090002 HH PPS REVENUE DEBIT

## 2018-06-26 PROCEDURE — 3331090001 HH PPS REVENUE CREDIT

## 2018-06-27 ENCOUNTER — HOME CARE VISIT (OUTPATIENT)
Dept: SCHEDULING | Facility: HOME HEALTH | Age: 68
End: 2018-06-27
Payer: MEDICARE

## 2018-06-27 PROCEDURE — G0158 HHC OT ASSISTANT EA 15: HCPCS

## 2018-06-27 PROCEDURE — 3331090002 HH PPS REVENUE DEBIT

## 2018-06-27 PROCEDURE — 3331090001 HH PPS REVENUE CREDIT

## 2018-06-28 ENCOUNTER — HOSPITAL ENCOUNTER (OUTPATIENT)
Dept: INFUSION THERAPY | Age: 68
Discharge: HOME | End: 2018-06-28
Payer: MEDICARE

## 2018-06-28 VITALS
OXYGEN SATURATION: 97 % | RESPIRATION RATE: 19 BRPM | HEART RATE: 78 BPM | SYSTOLIC BLOOD PRESSURE: 104 MMHG | DIASTOLIC BLOOD PRESSURE: 66 MMHG | TEMPERATURE: 98 F

## 2018-06-28 VITALS
RESPIRATION RATE: 16 BRPM | OXYGEN SATURATION: 90 % | BODY MASS INDEX: 40.38 KG/M2 | HEART RATE: 84 BPM | DIASTOLIC BLOOD PRESSURE: 62 MMHG | TEMPERATURE: 98 F | WEIGHT: 220.8 LBS | SYSTOLIC BLOOD PRESSURE: 120 MMHG

## 2018-06-28 DIAGNOSIS — D50.0 IRON DEFICIENCY ANEMIA DUE TO CHRONIC BLOOD LOSS: ICD-10-CM

## 2018-06-28 DIAGNOSIS — D46.9 MDS (MYELODYSPLASTIC SYNDROME) (HCC): ICD-10-CM

## 2018-06-28 LAB — HGB BLD-MCNC: 8.5 G/DL (ref 11.7–15.4)

## 2018-06-28 PROCEDURE — 3331090002 HH PPS REVENUE DEBIT

## 2018-06-28 PROCEDURE — 96372 THER/PROPH/DIAG INJ SC/IM: CPT

## 2018-06-28 PROCEDURE — 3331090001 HH PPS REVENUE CREDIT

## 2018-06-28 PROCEDURE — 74011250636 HC RX REV CODE- 250/636: Performed by: NURSE PRACTITIONER

## 2018-06-28 PROCEDURE — 85018 HEMOGLOBIN: CPT | Performed by: NURSE PRACTITIONER

## 2018-06-28 PROCEDURE — 36415 COLL VENOUS BLD VENIPUNCTURE: CPT | Performed by: NURSE PRACTITIONER

## 2018-06-28 RX ADMIN — ERYTHROPOIETIN 40000 UNITS: 40000 INJECTION, SOLUTION INTRAVENOUS; SUBCUTANEOUS at 16:25

## 2018-06-28 NOTE — PROGRESS NOTES
Arrived to the [facility]. procrit completed. Patient tolerated well. Any issues or concerns during appointment: no.  Patient aware of next infusion appointment on 7/17 (date) at 80 (time). Discharged home.

## 2018-06-29 ENCOUNTER — HOME CARE VISIT (OUTPATIENT)
Dept: HOME HEALTH SERVICES | Facility: HOME HEALTH | Age: 68
End: 2018-06-29
Payer: MEDICARE

## 2018-06-29 ENCOUNTER — HOME CARE VISIT (OUTPATIENT)
Dept: SCHEDULING | Facility: HOME HEALTH | Age: 68
End: 2018-06-29
Payer: MEDICARE

## 2018-06-29 VITALS
TEMPERATURE: 98 F | SYSTOLIC BLOOD PRESSURE: 138 MMHG | RESPIRATION RATE: 18 BRPM | OXYGEN SATURATION: 95 % | DIASTOLIC BLOOD PRESSURE: 82 MMHG | HEART RATE: 78 BPM

## 2018-06-29 PROCEDURE — G0158 HHC OT ASSISTANT EA 15: HCPCS

## 2018-06-29 PROCEDURE — 3331090001 HH PPS REVENUE CREDIT

## 2018-06-29 PROCEDURE — 3331090002 HH PPS REVENUE DEBIT

## 2018-06-30 PROCEDURE — 3331090002 HH PPS REVENUE DEBIT

## 2018-06-30 PROCEDURE — 3331090001 HH PPS REVENUE CREDIT

## 2018-07-01 PROCEDURE — 3331090002 HH PPS REVENUE DEBIT

## 2018-07-01 PROCEDURE — 3331090001 HH PPS REVENUE CREDIT

## 2018-07-02 ENCOUNTER — HOME CARE VISIT (OUTPATIENT)
Dept: SCHEDULING | Facility: HOME HEALTH | Age: 68
End: 2018-07-02
Payer: MEDICARE

## 2018-07-02 VITALS
TEMPERATURE: 98.1 F | HEART RATE: 74 BPM | OXYGEN SATURATION: 95 % | SYSTOLIC BLOOD PRESSURE: 135 MMHG | RESPIRATION RATE: 18 BRPM | DIASTOLIC BLOOD PRESSURE: 80 MMHG

## 2018-07-02 PROCEDURE — 3331090002 HH PPS REVENUE DEBIT

## 2018-07-02 PROCEDURE — 3331090001 HH PPS REVENUE CREDIT

## 2018-07-02 PROCEDURE — G0152 HHCP-SERV OF OT,EA 15 MIN: HCPCS

## 2018-07-03 PROCEDURE — 3331090001 HH PPS REVENUE CREDIT

## 2018-07-03 PROCEDURE — 3331090002 HH PPS REVENUE DEBIT

## 2018-07-04 PROCEDURE — 3331090002 HH PPS REVENUE DEBIT

## 2018-07-04 PROCEDURE — 3331090001 HH PPS REVENUE CREDIT

## 2018-07-05 ENCOUNTER — HOME CARE VISIT (OUTPATIENT)
Dept: SCHEDULING | Facility: HOME HEALTH | Age: 68
End: 2018-07-05
Payer: MEDICARE

## 2018-07-05 PROCEDURE — 3331090001 HH PPS REVENUE CREDIT

## 2018-07-05 PROCEDURE — G0299 HHS/HOSPICE OF RN EA 15 MIN: HCPCS

## 2018-07-05 PROCEDURE — 3331090002 HH PPS REVENUE DEBIT

## 2018-07-06 VITALS
OXYGEN SATURATION: 97 % | SYSTOLIC BLOOD PRESSURE: 134 MMHG | DIASTOLIC BLOOD PRESSURE: 78 MMHG | RESPIRATION RATE: 17 BRPM | HEART RATE: 72 BPM | TEMPERATURE: 97.8 F

## 2018-07-06 PROCEDURE — 3331090002 HH PPS REVENUE DEBIT

## 2018-07-06 PROCEDURE — 3331090001 HH PPS REVENUE CREDIT

## 2018-07-07 PROCEDURE — 3331090001 HH PPS REVENUE CREDIT

## 2018-07-07 PROCEDURE — 3331090002 HH PPS REVENUE DEBIT

## 2018-07-08 PROCEDURE — 3331090001 HH PPS REVENUE CREDIT

## 2018-07-08 PROCEDURE — 3331090002 HH PPS REVENUE DEBIT

## 2018-07-09 PROCEDURE — 3331090001 HH PPS REVENUE CREDIT

## 2018-07-09 PROCEDURE — 3331090002 HH PPS REVENUE DEBIT

## 2018-07-10 PROCEDURE — 3331090001 HH PPS REVENUE CREDIT

## 2018-07-10 PROCEDURE — 3331090002 HH PPS REVENUE DEBIT

## 2018-07-11 PROCEDURE — 3331090002 HH PPS REVENUE DEBIT

## 2018-07-11 PROCEDURE — 3331090001 HH PPS REVENUE CREDIT

## 2018-07-12 ENCOUNTER — HOME CARE VISIT (OUTPATIENT)
Dept: SCHEDULING | Facility: HOME HEALTH | Age: 68
End: 2018-07-12
Payer: MEDICARE

## 2018-07-12 LAB
INR BLD: 3.7 (ref 0.9–1.1)
PT POC: 44.7 SECONDS (ref 11.8–14.9)

## 2018-07-12 PROCEDURE — G0299 HHS/HOSPICE OF RN EA 15 MIN: HCPCS

## 2018-07-12 PROCEDURE — 3331090001 HH PPS REVENUE CREDIT

## 2018-07-12 PROCEDURE — 3331090002 HH PPS REVENUE DEBIT

## 2018-07-12 PROCEDURE — 3331090003 HH PPS REVENUE ADJ

## 2018-07-13 VITALS
DIASTOLIC BLOOD PRESSURE: 70 MMHG | TEMPERATURE: 98.1 F | RESPIRATION RATE: 17 BRPM | HEART RATE: 78 BPM | SYSTOLIC BLOOD PRESSURE: 122 MMHG | OXYGEN SATURATION: 96 %

## 2018-07-17 ENCOUNTER — HOSPITAL ENCOUNTER (OUTPATIENT)
Dept: INFUSION THERAPY | Age: 68
Discharge: HOME | End: 2018-07-17
Payer: MEDICARE

## 2018-07-17 VITALS
HEART RATE: 94 BPM | OXYGEN SATURATION: 92 % | TEMPERATURE: 98.1 F | RESPIRATION RATE: 16 BRPM | SYSTOLIC BLOOD PRESSURE: 146 MMHG | BODY MASS INDEX: 39.65 KG/M2 | WEIGHT: 216.8 LBS | DIASTOLIC BLOOD PRESSURE: 74 MMHG

## 2018-07-17 DIAGNOSIS — D50.0 IRON DEFICIENCY ANEMIA DUE TO CHRONIC BLOOD LOSS: ICD-10-CM

## 2018-07-17 DIAGNOSIS — D46.9 MDS (MYELODYSPLASTIC SYNDROME) (HCC): ICD-10-CM

## 2018-07-17 LAB — HGB BLD-MCNC: 8.9 G/DL (ref 11.7–15.4)

## 2018-07-17 PROCEDURE — 74011250636 HC RX REV CODE- 250/636: Performed by: NURSE PRACTITIONER

## 2018-07-17 PROCEDURE — 36415 COLL VENOUS BLD VENIPUNCTURE: CPT | Performed by: INTERNAL MEDICINE

## 2018-07-17 PROCEDURE — 96372 THER/PROPH/DIAG INJ SC/IM: CPT

## 2018-07-17 PROCEDURE — 85018 HEMOGLOBIN: CPT | Performed by: INTERNAL MEDICINE

## 2018-07-17 RX ADMIN — ERYTHROPOIETIN 40000 UNITS: 40000 INJECTION, SOLUTION INTRAVENOUS; SUBCUTANEOUS at 14:01

## 2018-07-19 ENCOUNTER — APPOINTMENT (OUTPATIENT)
Dept: INFUSION THERAPY | Age: 68
End: 2018-07-19

## 2018-08-07 ENCOUNTER — HOSPITAL ENCOUNTER (OUTPATIENT)
Dept: INFUSION THERAPY | Age: 68
Discharge: HOME | End: 2018-08-07
Payer: MEDICARE

## 2018-08-07 VITALS
TEMPERATURE: 98.2 F | DIASTOLIC BLOOD PRESSURE: 56 MMHG | HEART RATE: 73 BPM | SYSTOLIC BLOOD PRESSURE: 93 MMHG | BODY MASS INDEX: 39.54 KG/M2 | RESPIRATION RATE: 16 BRPM | OXYGEN SATURATION: 91 % | WEIGHT: 216.2 LBS

## 2018-08-07 DIAGNOSIS — D50.0 IRON DEFICIENCY ANEMIA DUE TO CHRONIC BLOOD LOSS: ICD-10-CM

## 2018-08-07 DIAGNOSIS — D46.9 MDS (MYELODYSPLASTIC SYNDROME) (HCC): ICD-10-CM

## 2018-08-07 LAB
HCT VFR BLD AUTO: 27.1 % (ref 35.8–46.3)
HGB BLD-MCNC: 8.1 G/DL (ref 11.7–15.4)

## 2018-08-07 PROCEDURE — 36415 COLL VENOUS BLD VENIPUNCTURE: CPT

## 2018-08-07 PROCEDURE — 85014 HEMATOCRIT: CPT

## 2018-08-07 PROCEDURE — 96372 THER/PROPH/DIAG INJ SC/IM: CPT

## 2018-08-07 PROCEDURE — 74011250636 HC RX REV CODE- 250/636: Performed by: NURSE PRACTITIONER

## 2018-08-07 PROCEDURE — 85018 HEMOGLOBIN: CPT

## 2018-08-07 RX ADMIN — ERYTHROPOIETIN 40000 UNITS: 40000 INJECTION, SOLUTION INTRAVENOUS; SUBCUTANEOUS at 14:00

## 2018-08-07 NOTE — PROGRESS NOTES
Arrived to the [facility]. Procrit injection completed. Patient tolerated well. Any issues or concerns during appointment: None. Patient aware of next infusion appointment on 08.31.18 at 1500. Discharged in wheelchair with assistant to home.

## 2018-08-09 ENCOUNTER — APPOINTMENT (OUTPATIENT)
Dept: INFUSION THERAPY | Age: 68
End: 2018-08-09

## 2018-08-21 PROBLEM — Z23 ENCOUNTER FOR IMMUNIZATION: Status: ACTIVE | Noted: 2018-08-21

## 2018-08-31 ENCOUNTER — HOSPITAL ENCOUNTER (OUTPATIENT)
Dept: INFUSION THERAPY | Age: 68
Discharge: HOME | End: 2018-08-31

## 2018-08-31 ENCOUNTER — HOSPITAL ENCOUNTER (OUTPATIENT)
Dept: LAB | Age: 68
Discharge: HOME | End: 2018-08-31
Payer: MEDICARE

## 2018-08-31 DIAGNOSIS — D46.9 MDS (MYELODYSPLASTIC SYNDROME) (HCC): Chronic | ICD-10-CM

## 2018-08-31 LAB
ALBUMIN SERPL-MCNC: 3.3 G/DL (ref 3.2–4.6)
ALBUMIN/GLOB SERPL: 0.9 {RATIO} (ref 1.2–3.5)
ALP SERPL-CCNC: 45 U/L (ref 50–136)
ALT SERPL-CCNC: 16 U/L (ref 12–65)
ANION GAP SERPL CALC-SCNC: 4 MMOL/L (ref 7–16)
AST SERPL-CCNC: 15 U/L (ref 15–37)
BASOPHILS # BLD: 0 K/UL (ref 0–0.2)
BASOPHILS NFR BLD: 0 % (ref 0–2)
BILIRUB SERPL-MCNC: 0.5 MG/DL (ref 0.2–1.1)
BUN SERPL-MCNC: 52 MG/DL (ref 8–23)
CALCIUM SERPL-MCNC: 9.7 MG/DL (ref 8.3–10.4)
CHLORIDE SERPL-SCNC: 93 MMOL/L (ref 98–107)
CO2 SERPL-SCNC: 40 MMOL/L (ref 21–32)
CREAT SERPL-MCNC: 1.8 MG/DL (ref 0.6–1)
DIFFERENTIAL METHOD BLD: ABNORMAL
EOSINOPHIL # BLD: 0.1 K/UL (ref 0–0.8)
EOSINOPHIL NFR BLD: 1 % (ref 0.5–7.8)
ERYTHROCYTE [DISTWIDTH] IN BLOOD BY AUTOMATED COUNT: 14 % (ref 11.9–14.6)
FERRITIN SERPL-MCNC: 28 NG/ML (ref 8–388)
GLOBULIN SER CALC-MCNC: 3.5 G/DL (ref 2.3–3.5)
GLUCOSE SERPL-MCNC: 134 MG/DL (ref 65–100)
HCT VFR BLD AUTO: 22.1 % (ref 35.8–46.3)
HGB BLD-MCNC: 6.7 G/DL (ref 11.7–15.4)
IMM GRANULOCYTES # BLD: 0 K/UL (ref 0–0.5)
IMM GRANULOCYTES NFR BLD AUTO: 0 % (ref 0–5)
IRON SATN MFR SERPL: 31 %
IRON SERPL-MCNC: 82 UG/DL (ref 35–150)
LYMPHOCYTES # BLD: 1.8 K/UL (ref 0.5–4.6)
LYMPHOCYTES NFR BLD: 23 % (ref 13–44)
MCH RBC QN AUTO: 28.4 PG (ref 26.1–32.9)
MCHC RBC AUTO-ENTMCNC: 30.3 G/DL (ref 31.4–35)
MCV RBC AUTO: 93.6 FL (ref 79.6–97.8)
MONOCYTES # BLD: 0.6 K/UL (ref 0.1–1.3)
MONOCYTES NFR BLD: 8 % (ref 4–12)
NEUTS SEG # BLD: 5.1 K/UL (ref 1.7–8.2)
NEUTS SEG NFR BLD: 67 % (ref 43–78)
NRBC # BLD: 0 K/UL (ref 0–0.2)
PLATELET # BLD AUTO: 133 K/UL (ref 150–450)
PMV BLD AUTO: 11.1 FL (ref 9.4–12.3)
POTASSIUM SERPL-SCNC: 4.3 MMOL/L (ref 3.5–5.1)
PROT SERPL-MCNC: 6.8 G/DL (ref 6.3–8.2)
RBC # BLD AUTO: 2.36 M/UL (ref 4.05–5.25)
SODIUM SERPL-SCNC: 137 MMOL/L (ref 136–145)
TIBC SERPL-MCNC: 261 UG/DL (ref 250–450)
WBC # BLD AUTO: 7.7 K/UL (ref 4.3–11.1)

## 2018-08-31 PROCEDURE — 86920 COMPATIBILITY TEST SPIN: CPT

## 2018-08-31 PROCEDURE — 86644 CMV ANTIBODY: CPT

## 2018-08-31 PROCEDURE — 85025 COMPLETE CBC W/AUTO DIFF WBC: CPT

## 2018-08-31 PROCEDURE — 82728 ASSAY OF FERRITIN: CPT

## 2018-08-31 PROCEDURE — 80053 COMPREHEN METABOLIC PANEL: CPT

## 2018-08-31 PROCEDURE — 36415 COLL VENOUS BLD VENIPUNCTURE: CPT

## 2018-08-31 PROCEDURE — 86901 BLOOD TYPING SEROLOGIC RH(D): CPT

## 2018-08-31 PROCEDURE — 83540 ASSAY OF IRON: CPT

## 2018-09-02 ENCOUNTER — HOSPITAL ENCOUNTER (OUTPATIENT)
Dept: INFUSION THERAPY | Age: 68
Discharge: HOME | End: 2018-09-02
Payer: MEDICARE

## 2018-09-02 VITALS
RESPIRATION RATE: 16 BRPM | HEART RATE: 63 BPM | SYSTOLIC BLOOD PRESSURE: 114 MMHG | OXYGEN SATURATION: 99 % | DIASTOLIC BLOOD PRESSURE: 57 MMHG | TEMPERATURE: 97.8 F

## 2018-09-02 DIAGNOSIS — N18.30 CKD (CHRONIC KIDNEY DISEASE) STAGE 3, GFR 30-59 ML/MIN (HCC): ICD-10-CM

## 2018-09-02 DIAGNOSIS — Z79.01 LONG TERM (CURRENT) USE OF ANTICOAGULANTS: ICD-10-CM

## 2018-09-02 DIAGNOSIS — Z95.2 S/P AVR (AORTIC VALVE REPLACEMENT): ICD-10-CM

## 2018-09-02 DIAGNOSIS — D50.0 IRON DEFICIENCY ANEMIA DUE TO CHRONIC BLOOD LOSS: ICD-10-CM

## 2018-09-02 DIAGNOSIS — D46.9 MDS (MYELODYSPLASTIC SYNDROME) (HCC): ICD-10-CM

## 2018-09-02 PROCEDURE — 36430 TRANSFUSION BLD/BLD COMPNT: CPT

## 2018-09-02 PROCEDURE — 74011250636 HC RX REV CODE- 250/636: Performed by: INTERNAL MEDICINE

## 2018-09-02 PROCEDURE — 74011250637 HC RX REV CODE- 250/637: Performed by: INTERNAL MEDICINE

## 2018-09-02 PROCEDURE — P9040 RBC LEUKOREDUCED IRRADIATED: HCPCS

## 2018-09-02 PROCEDURE — 96372 THER/PROPH/DIAG INJ SC/IM: CPT

## 2018-09-02 PROCEDURE — 77030018667 ADMN ST IV BLD FENW -A

## 2018-09-02 PROCEDURE — 74011250636 HC RX REV CODE- 250/636: Performed by: NURSE PRACTITIONER

## 2018-09-02 RX ORDER — ACETAMINOPHEN 325 MG/1
650 TABLET ORAL ONCE
Status: COMPLETED | OUTPATIENT
Start: 2018-09-02 | End: 2018-09-02

## 2018-09-02 RX ORDER — SODIUM CHLORIDE 9 MG/ML
250 INJECTION, SOLUTION INTRAVENOUS AS NEEDED
Status: DISCONTINUED | OUTPATIENT
Start: 2018-09-02 | End: 2018-09-06 | Stop reason: HOSPADM

## 2018-09-02 RX ORDER — DIPHENHYDRAMINE HCL 25 MG
25 CAPSULE ORAL
Status: DISCONTINUED | OUTPATIENT
Start: 2018-09-02 | End: 2018-09-06 | Stop reason: HOSPADM

## 2018-09-02 RX ADMIN — DIPHENHYDRAMINE HYDROCHLORIDE 25 MG: 25 CAPSULE ORAL at 08:57

## 2018-09-02 RX ADMIN — ERYTHROPOIETIN 60000 UNITS: 40000 INJECTION, SOLUTION INTRAVENOUS; SUBCUTANEOUS at 11:31

## 2018-09-02 RX ADMIN — SODIUM CHLORIDE 250 ML: 900 INJECTION, SOLUTION INTRAVENOUS at 08:15

## 2018-09-02 RX ADMIN — ACETAMINOPHEN 650 MG: 325 TABLET, FILM COATED ORAL at 08:57

## 2018-09-02 NOTE — PROGRESS NOTES
Arrived to the [facility]. 1 unit PRBC and procrit completed. Patient tolerated well Any issues or concerns during appointment: patient fell at home and had some bruising, Imani Padilla NP made aware Patient aware of next infusion appointment on 09/21 3 pm 
Discharged wheelchair with family

## 2018-09-03 LAB
ABO + RH BLD: NORMAL
BLD PROD TYP BPU: NORMAL
BLOOD GROUP ANTIBODIES SERPL: NORMAL
BPU ID: NORMAL
CROSSMATCH RESULT,%XM: NORMAL
SPECIMEN EXP DATE BLD: NORMAL
STATUS OF UNIT,%ST: NORMAL
UNIT DIVISION, %UDIV: 0

## 2018-09-07 ENCOUNTER — APPOINTMENT (OUTPATIENT)
Dept: GENERAL RADIOLOGY | Age: 68
DRG: 812 | End: 2018-09-07
Attending: EMERGENCY MEDICINE
Payer: MEDICARE

## 2018-09-07 ENCOUNTER — HOSPITAL ENCOUNTER (INPATIENT)
Age: 68
LOS: 4 days | Discharge: NURSING, REHAB OR LONG-TERM CARE | DRG: 812 | End: 2018-09-11
Attending: EMERGENCY MEDICINE | Admitting: INTERNAL MEDICINE
Payer: MEDICARE

## 2018-09-07 ENCOUNTER — APPOINTMENT (OUTPATIENT)
Dept: CT IMAGING | Age: 68
DRG: 812 | End: 2018-09-07
Attending: EMERGENCY MEDICINE
Payer: MEDICARE

## 2018-09-07 DIAGNOSIS — N17.9 ACUTE KIDNEY INJURY SUPERIMPOSED ON CHRONIC KIDNEY DISEASE (HCC): ICD-10-CM

## 2018-09-07 DIAGNOSIS — Z79.01 LONG TERM (CURRENT) USE OF ANTICOAGULANTS: Chronic | ICD-10-CM

## 2018-09-07 DIAGNOSIS — S06.5XAA SUBDURAL HEMATOMA: ICD-10-CM

## 2018-09-07 DIAGNOSIS — E78.2 MIXED HYPERLIPIDEMIA: Chronic | ICD-10-CM

## 2018-09-07 DIAGNOSIS — N18.30 CKD (CHRONIC KIDNEY DISEASE) STAGE 3, GFR 30-59 ML/MIN (HCC): Chronic | ICD-10-CM

## 2018-09-07 DIAGNOSIS — F34.1 DYSTHYMIA: ICD-10-CM

## 2018-09-07 DIAGNOSIS — Z79.01 ANTICOAGULATED ON COUMADIN: Chronic | ICD-10-CM

## 2018-09-07 DIAGNOSIS — J44.9 CHRONIC OBSTRUCTIVE PULMONARY DISEASE, UNSPECIFIED COPD TYPE (HCC): Chronic | ICD-10-CM

## 2018-09-07 DIAGNOSIS — D68.9 COAGULOPATHY (HCC): ICD-10-CM

## 2018-09-07 DIAGNOSIS — I25.10 CORONARY ARTERY DISEASE INVOLVING NATIVE CORONARY ARTERY OF NATIVE HEART WITHOUT ANGINA PECTORIS: ICD-10-CM

## 2018-09-07 DIAGNOSIS — N18.9 ACUTE KIDNEY INJURY SUPERIMPOSED ON CHRONIC KIDNEY DISEASE (HCC): ICD-10-CM

## 2018-09-07 DIAGNOSIS — Z95.810 ICD (IMPLANTABLE CARDIOVERTER-DEFIBRILLATOR) IN PLACE: ICD-10-CM

## 2018-09-07 DIAGNOSIS — I42.9 CARDIOMYOPATHY, UNSPECIFIED TYPE (HCC): Chronic | ICD-10-CM

## 2018-09-07 DIAGNOSIS — E11.9 DIABETES MELLITUS TYPE 2, DIET-CONTROLLED (HCC): Chronic | ICD-10-CM

## 2018-09-07 DIAGNOSIS — J96.11 CHRONIC RESPIRATORY FAILURE WITH HYPOXIA (HCC): Chronic | ICD-10-CM

## 2018-09-07 DIAGNOSIS — I10 ESSENTIAL HYPERTENSION, BENIGN: Chronic | ICD-10-CM

## 2018-09-07 DIAGNOSIS — I27.20 PULMONARY HYPERTENSION (HCC): Chronic | ICD-10-CM

## 2018-09-07 DIAGNOSIS — R53.81 PHYSICAL DEBILITY: ICD-10-CM

## 2018-09-07 DIAGNOSIS — M19.019 OSTEOARTHRITIS OF SHOULDER, UNSPECIFIED LATERALITY, UNSPECIFIED OSTEOARTHRITIS TYPE: ICD-10-CM

## 2018-09-07 DIAGNOSIS — G47.33 OSA (OBSTRUCTIVE SLEEP APNEA): Chronic | ICD-10-CM

## 2018-09-07 DIAGNOSIS — E66.01 OBESITY, MORBID (HCC): ICD-10-CM

## 2018-09-07 DIAGNOSIS — E11.21 TYPE 2 DIABETES MELLITUS WITH NEPHROPATHY (HCC): Chronic | ICD-10-CM

## 2018-09-07 DIAGNOSIS — Z23 ENCOUNTER FOR IMMUNIZATION: ICD-10-CM

## 2018-09-07 DIAGNOSIS — S62.357S: ICD-10-CM

## 2018-09-07 DIAGNOSIS — R53.81 DEBILITY: ICD-10-CM

## 2018-09-07 DIAGNOSIS — M85.80 OSTEOPENIA, UNSPECIFIED LOCATION: ICD-10-CM

## 2018-09-07 DIAGNOSIS — S80.02XS TRAUMATIC HEMATOMA OF LEFT KNEE, SEQUELA: ICD-10-CM

## 2018-09-07 DIAGNOSIS — D64.9 ANEMIA, UNSPECIFIED TYPE: Primary | ICD-10-CM

## 2018-09-07 DIAGNOSIS — D50.0 IRON DEFICIENCY ANEMIA DUE TO CHRONIC BLOOD LOSS: Chronic | ICD-10-CM

## 2018-09-07 DIAGNOSIS — I50.42 CHRONIC COMBINED SYSTOLIC AND DIASTOLIC HEART FAILURE (HCC): Chronic | ICD-10-CM

## 2018-09-07 DIAGNOSIS — Z95.2 S/P AVR (AORTIC VALVE REPLACEMENT): Chronic | ICD-10-CM

## 2018-09-07 DIAGNOSIS — D46.9 MDS (MYELODYSPLASTIC SYNDROME) (HCC): Chronic | ICD-10-CM

## 2018-09-07 LAB
ALBUMIN SERPL-MCNC: 3.2 G/DL (ref 3.2–4.6)
ALBUMIN/GLOB SERPL: 0.9 {RATIO} (ref 1.2–3.5)
ALP SERPL-CCNC: 48 U/L (ref 50–136)
ALT SERPL-CCNC: 17 U/L (ref 12–65)
ANION GAP SERPL CALC-SCNC: 2 MMOL/L (ref 7–16)
AST SERPL-CCNC: 11 U/L (ref 15–37)
BASOPHILS # BLD: 0 K/UL (ref 0–0.2)
BASOPHILS NFR BLD: 0 % (ref 0–2)
BILIRUB SERPL-MCNC: 0.8 MG/DL (ref 0.2–1.1)
BUN SERPL-MCNC: 68 MG/DL (ref 8–23)
CALCIUM SERPL-MCNC: 9.2 MG/DL (ref 8.3–10.4)
CHLORIDE SERPL-SCNC: 94 MMOL/L (ref 98–107)
CO2 SERPL-SCNC: 40 MMOL/L (ref 21–32)
CREAT SERPL-MCNC: 2.19 MG/DL (ref 0.6–1)
DIFFERENTIAL METHOD BLD: ABNORMAL
EOSINOPHIL # BLD: 0.1 K/UL (ref 0–0.8)
EOSINOPHIL NFR BLD: 1 % (ref 0.5–7.8)
ERYTHROCYTE [DISTWIDTH] IN BLOOD BY AUTOMATED COUNT: 14.6 %
FLUAV AG NPH QL IA: NEGATIVE
FLUBV AG NPH QL IA: NEGATIVE
GLOBULIN SER CALC-MCNC: 3.7 G/DL (ref 2.3–3.5)
GLUCOSE SERPL-MCNC: 139 MG/DL (ref 65–100)
HCT VFR BLD AUTO: 22 % (ref 35.8–46.3)
HGB BLD-MCNC: 6.5 G/DL (ref 11.7–15.4)
IMM GRANULOCYTES # BLD: 0 K/UL (ref 0–0.5)
IMM GRANULOCYTES NFR BLD AUTO: 0 % (ref 0–5)
INR PPP: 4.3
LYMPHOCYTES # BLD: 1.2 K/UL (ref 0.5–4.6)
LYMPHOCYTES NFR BLD: 17 % (ref 13–44)
MCH RBC QN AUTO: 29.1 PG (ref 26.1–32.9)
MCHC RBC AUTO-ENTMCNC: 29.5 G/DL (ref 31.4–35)
MCV RBC AUTO: 98.7 FL (ref 79.6–97.8)
MONOCYTES # BLD: 0.6 K/UL (ref 0.1–1.3)
MONOCYTES NFR BLD: 8 % (ref 4–12)
NEUTS SEG # BLD: 5.1 K/UL (ref 1.7–8.2)
NEUTS SEG NFR BLD: 74 % (ref 43–78)
NRBC # BLD: 0 K/UL (ref 0–0.2)
PLATELET # BLD AUTO: 186 K/UL (ref 150–450)
PMV BLD AUTO: 10.2 FL (ref 9.4–12.3)
POTASSIUM SERPL-SCNC: 5.2 MMOL/L (ref 3.5–5.1)
PROT SERPL-MCNC: 6.9 G/DL (ref 6.3–8.2)
PROTHROMBIN TIME: 39.5 SEC (ref 11.5–14.5)
RBC # BLD AUTO: 2.23 M/UL (ref 4.05–5.2)
SODIUM SERPL-SCNC: 136 MMOL/L (ref 136–145)
SPECIMEN SOURCE: NORMAL
WBC # BLD AUTO: 6.9 K/UL (ref 4.3–11.1)

## 2018-09-07 PROCEDURE — 93005 ELECTROCARDIOGRAM TRACING: CPT | Performed by: EMERGENCY MEDICINE

## 2018-09-07 PROCEDURE — 85610 PROTHROMBIN TIME: CPT

## 2018-09-07 PROCEDURE — 86901 BLOOD TYPING SEROLOGIC RH(D): CPT

## 2018-09-07 PROCEDURE — 81003 URINALYSIS AUTO W/O SCOPE: CPT | Performed by: EMERGENCY MEDICINE

## 2018-09-07 PROCEDURE — 74011250636 HC RX REV CODE- 250/636: Performed by: EMERGENCY MEDICINE

## 2018-09-07 PROCEDURE — 74011250636 HC RX REV CODE- 250/636: Performed by: FAMILY MEDICINE

## 2018-09-07 PROCEDURE — 36430 TRANSFUSION BLD/BLD COMPNT: CPT

## 2018-09-07 PROCEDURE — 94640 AIRWAY INHALATION TREATMENT: CPT

## 2018-09-07 PROCEDURE — 74011250637 HC RX REV CODE- 250/637: Performed by: INTERNAL MEDICINE

## 2018-09-07 PROCEDURE — 94760 N-INVAS EAR/PLS OXIMETRY 1: CPT

## 2018-09-07 PROCEDURE — 70450 CT HEAD/BRAIN W/O DYE: CPT

## 2018-09-07 PROCEDURE — P9040 RBC LEUKOREDUCED IRRADIATED: HCPCS

## 2018-09-07 PROCEDURE — 85025 COMPLETE CBC W/AUTO DIFF WBC: CPT

## 2018-09-07 PROCEDURE — 77010033678 HC OXYGEN DAILY

## 2018-09-07 PROCEDURE — 80053 COMPREHEN METABOLIC PANEL: CPT

## 2018-09-07 PROCEDURE — 77030039270 HC TU BLD FLTR CARD -A

## 2018-09-07 PROCEDURE — 87804 INFLUENZA ASSAY W/OPTIC: CPT

## 2018-09-07 PROCEDURE — 96360 HYDRATION IV INFUSION INIT: CPT | Performed by: EMERGENCY MEDICINE

## 2018-09-07 PROCEDURE — 73562 X-RAY EXAM OF KNEE 3: CPT

## 2018-09-07 PROCEDURE — 65270000029 HC RM PRIVATE

## 2018-09-07 PROCEDURE — 30233N1 TRANSFUSION OF NONAUTOLOGOUS RED BLOOD CELLS INTO PERIPHERAL VEIN, PERCUTANEOUS APPROACH: ICD-10-PCS | Performed by: INTERNAL MEDICINE

## 2018-09-07 PROCEDURE — 74011000250 HC RX REV CODE- 250: Performed by: INTERNAL MEDICINE

## 2018-09-07 PROCEDURE — 77030020263 HC SOL INJ SOD CL0.9% LFCR 1000ML

## 2018-09-07 PROCEDURE — 71045 X-RAY EXAM CHEST 1 VIEW: CPT

## 2018-09-07 PROCEDURE — 99285 EMERGENCY DEPT VISIT HI MDM: CPT | Performed by: EMERGENCY MEDICINE

## 2018-09-07 PROCEDURE — 86923 COMPATIBILITY TEST ELECTRIC: CPT

## 2018-09-07 RX ORDER — SODIUM CHLORIDE 0.9 % (FLUSH) 0.9 %
5-10 SYRINGE (ML) INJECTION EVERY 8 HOURS
Status: DISCONTINUED | OUTPATIENT
Start: 2018-09-07 | End: 2018-09-11 | Stop reason: HOSPADM

## 2018-09-07 RX ORDER — ESCITALOPRAM OXALATE 10 MG/1
20 TABLET ORAL DAILY
Status: DISCONTINUED | OUTPATIENT
Start: 2018-09-08 | End: 2018-09-11 | Stop reason: HOSPADM

## 2018-09-07 RX ORDER — ALBUTEROL SULFATE 0.83 MG/ML
2.5 SOLUTION RESPIRATORY (INHALATION)
Status: DISCONTINUED | OUTPATIENT
Start: 2018-09-07 | End: 2018-09-11 | Stop reason: HOSPADM

## 2018-09-07 RX ORDER — SODIUM CHLORIDE 0.9 % (FLUSH) 0.9 %
5-10 SYRINGE (ML) INJECTION AS NEEDED
Status: DISCONTINUED | OUTPATIENT
Start: 2018-09-07 | End: 2018-09-11 | Stop reason: HOSPADM

## 2018-09-07 RX ORDER — ASPIRIN 81 MG/1
81 TABLET ORAL DAILY
Status: DISCONTINUED | OUTPATIENT
Start: 2018-09-08 | End: 2018-09-08

## 2018-09-07 RX ORDER — ASCORBIC ACID 500 MG
500 TABLET ORAL DAILY
Status: DISCONTINUED | OUTPATIENT
Start: 2018-09-08 | End: 2018-09-11 | Stop reason: HOSPADM

## 2018-09-07 RX ORDER — SODIUM CHLORIDE 9 MG/ML
250 INJECTION, SOLUTION INTRAVENOUS AS NEEDED
Status: DISCONTINUED | OUTPATIENT
Start: 2018-09-07 | End: 2018-09-11 | Stop reason: HOSPADM

## 2018-09-07 RX ORDER — ONDANSETRON 2 MG/ML
4 INJECTION INTRAMUSCULAR; INTRAVENOUS
Status: DISCONTINUED | OUTPATIENT
Start: 2018-09-07 | End: 2018-09-11 | Stop reason: HOSPADM

## 2018-09-07 RX ORDER — INSULIN LISPRO 100 [IU]/ML
INJECTION, SOLUTION INTRAVENOUS; SUBCUTANEOUS
Status: DISCONTINUED | OUTPATIENT
Start: 2018-09-07 | End: 2018-09-11 | Stop reason: HOSPADM

## 2018-09-07 RX ORDER — ISOSORBIDE MONONITRATE 30 MG/1
30 TABLET, EXTENDED RELEASE ORAL DAILY
Status: DISCONTINUED | OUTPATIENT
Start: 2018-09-08 | End: 2018-09-11 | Stop reason: HOSPADM

## 2018-09-07 RX ORDER — BUDESONIDE 0.5 MG/2ML
500 INHALANT ORAL
Status: DISCONTINUED | OUTPATIENT
Start: 2018-09-07 | End: 2018-09-11 | Stop reason: HOSPADM

## 2018-09-07 RX ORDER — SODIUM CHLORIDE 9 MG/ML
50 INJECTION, SOLUTION INTRAVENOUS CONTINUOUS
Status: DISCONTINUED | OUTPATIENT
Start: 2018-09-07 | End: 2018-09-09

## 2018-09-07 RX ORDER — SIMVASTATIN 20 MG/1
20 TABLET, FILM COATED ORAL
Status: DISCONTINUED | OUTPATIENT
Start: 2018-09-07 | End: 2018-09-11 | Stop reason: HOSPADM

## 2018-09-07 RX ORDER — FUROSEMIDE 20 MG/1
80 TABLET ORAL 2 TIMES DAILY
Status: DISCONTINUED | OUTPATIENT
Start: 2018-09-08 | End: 2018-09-11 | Stop reason: HOSPADM

## 2018-09-07 RX ORDER — CARVEDILOL 6.25 MG/1
6.25 TABLET ORAL 2 TIMES DAILY WITH MEALS
Status: DISCONTINUED | OUTPATIENT
Start: 2018-09-08 | End: 2018-09-11 | Stop reason: HOSPADM

## 2018-09-07 RX ORDER — ACETAMINOPHEN 325 MG/1
650 TABLET ORAL
Status: DISCONTINUED | OUTPATIENT
Start: 2018-09-07 | End: 2018-09-11 | Stop reason: HOSPADM

## 2018-09-07 RX ADMIN — BUDESONIDE 500 MCG: 0.5 INHALANT RESPIRATORY (INHALATION) at 22:33

## 2018-09-07 RX ADMIN — SODIUM CHLORIDE 500 ML: 900 INJECTION, SOLUTION INTRAVENOUS at 20:20

## 2018-09-07 RX ADMIN — ALBUTEROL SULFATE 2.5 MG: 2.5 SOLUTION RESPIRATORY (INHALATION) at 22:33

## 2018-09-07 RX ADMIN — SODIUM CHLORIDE 50 ML/HR: 900 INJECTION, SOLUTION INTRAVENOUS at 22:01

## 2018-09-07 RX ADMIN — SIMVASTATIN 20 MG: 20 TABLET, FILM COATED ORAL at 22:39

## 2018-09-07 NOTE — ED TRIAGE NOTES
Pt reports she fell while taking a shower on the 28th, pt has left blood blister to left knee, hx of anemia. Family also states pt has been more confused than normal recently as well

## 2018-09-08 PROBLEM — D68.9 COAGULOPATHY (HCC): Status: ACTIVE | Noted: 2018-09-08

## 2018-09-08 PROBLEM — R53.81 DEBILITY: Status: ACTIVE | Noted: 2018-09-08

## 2018-09-08 PROBLEM — S80.02XA TRAUMATIC HEMATOMA OF LEFT KNEE: Status: ACTIVE | Noted: 2018-09-08

## 2018-09-08 LAB
ANION GAP SERPL CALC-SCNC: 6 MMOL/L (ref 7–16)
ATRIAL RATE: 67 BPM
BASOPHILS # BLD: 0 K/UL (ref 0–0.2)
BASOPHILS NFR BLD: 0 % (ref 0–2)
BUN SERPL-MCNC: 62 MG/DL (ref 8–23)
CALCIUM SERPL-MCNC: 9.4 MG/DL (ref 8.3–10.4)
CALCULATED P AXIS, ECG09: 48 DEGREES
CALCULATED R AXIS, ECG10: -53 DEGREES
CALCULATED T AXIS, ECG11: 105 DEGREES
CHLORIDE SERPL-SCNC: 96 MMOL/L (ref 98–107)
CO2 SERPL-SCNC: 38 MMOL/L (ref 21–32)
CREAT SERPL-MCNC: 1.75 MG/DL (ref 0.6–1)
DIAGNOSIS, 93000: NORMAL
DIFFERENTIAL METHOD BLD: ABNORMAL
EOSINOPHIL # BLD: 0.2 K/UL (ref 0–0.8)
EOSINOPHIL NFR BLD: 2 % (ref 0.5–7.8)
ERYTHROCYTE [DISTWIDTH] IN BLOOD BY AUTOMATED COUNT: 15.5 %
GLUCOSE BLD STRIP.AUTO-MCNC: 113 MG/DL (ref 65–100)
GLUCOSE BLD STRIP.AUTO-MCNC: 158 MG/DL (ref 65–100)
GLUCOSE BLD STRIP.AUTO-MCNC: 184 MG/DL (ref 65–100)
GLUCOSE BLD STRIP.AUTO-MCNC: 95 MG/DL (ref 65–100)
GLUCOSE SERPL-MCNC: 93 MG/DL (ref 65–100)
HCT VFR BLD AUTO: 29.5 % (ref 35.8–46.3)
HGB BLD-MCNC: 9.3 G/DL (ref 11.7–15.4)
IMM GRANULOCYTES # BLD: 0 K/UL (ref 0–0.5)
IMM GRANULOCYTES NFR BLD AUTO: 0 % (ref 0–5)
INR PPP: 3.4
LYMPHOCYTES # BLD: 1.4 K/UL (ref 0.5–4.6)
LYMPHOCYTES NFR BLD: 19 % (ref 13–44)
MCH RBC QN AUTO: 29.7 PG (ref 26.1–32.9)
MCHC RBC AUTO-ENTMCNC: 31.5 G/DL (ref 31.4–35)
MCV RBC AUTO: 94.2 FL (ref 79.6–97.8)
MONOCYTES # BLD: 0.7 K/UL (ref 0.1–1.3)
MONOCYTES NFR BLD: 10 % (ref 4–12)
NEUTS SEG # BLD: 5 K/UL (ref 1.7–8.2)
NEUTS SEG NFR BLD: 69 % (ref 43–78)
NRBC # BLD: 0 K/UL (ref 0–0.2)
P-R INTERVAL, ECG05: 196 MS
PLATELET # BLD AUTO: 192 K/UL (ref 150–450)
PMV BLD AUTO: 10.3 FL (ref 9.4–12.3)
POTASSIUM SERPL-SCNC: 4.7 MMOL/L (ref 3.5–5.1)
PROTHROMBIN TIME: 32.6 SEC (ref 11.5–14.5)
Q-T INTERVAL, ECG07: 404 MS
QRS DURATION, ECG06: 134 MS
QTC CALCULATION (BEZET), ECG08: 426 MS
RBC # BLD AUTO: 3.13 M/UL (ref 4.05–5.2)
SODIUM SERPL-SCNC: 140 MMOL/L (ref 136–145)
VENTRICULAR RATE, ECG03: 67 BPM
WBC # BLD AUTO: 7.2 K/UL (ref 4.3–11.1)

## 2018-09-08 PROCEDURE — 80048 BASIC METABOLIC PNL TOTAL CA: CPT

## 2018-09-08 PROCEDURE — P9040 RBC LEUKOREDUCED IRRADIATED: HCPCS

## 2018-09-08 PROCEDURE — 74011250636 HC RX REV CODE- 250/636: Performed by: INTERNAL MEDICINE

## 2018-09-08 PROCEDURE — 36415 COLL VENOUS BLD VENIPUNCTURE: CPT

## 2018-09-08 PROCEDURE — 74011000250 HC RX REV CODE- 250: Performed by: INTERNAL MEDICINE

## 2018-09-08 PROCEDURE — 94760 N-INVAS EAR/PLS OXIMETRY 1: CPT

## 2018-09-08 PROCEDURE — 77010033678 HC OXYGEN DAILY

## 2018-09-08 PROCEDURE — 77030019605

## 2018-09-08 PROCEDURE — 85025 COMPLETE CBC W/AUTO DIFF WBC: CPT

## 2018-09-08 PROCEDURE — 77030039270 HC TU BLD FLTR CARD -A

## 2018-09-08 PROCEDURE — 94660 CPAP INITIATION&MGMT: CPT

## 2018-09-08 PROCEDURE — 82962 GLUCOSE BLOOD TEST: CPT

## 2018-09-08 PROCEDURE — 36430 TRANSFUSION BLD/BLD COMPNT: CPT

## 2018-09-08 PROCEDURE — 85610 PROTHROMBIN TIME: CPT

## 2018-09-08 PROCEDURE — 94640 AIRWAY INHALATION TREATMENT: CPT

## 2018-09-08 PROCEDURE — 74011250637 HC RX REV CODE- 250/637: Performed by: INTERNAL MEDICINE

## 2018-09-08 PROCEDURE — 65270000029 HC RM PRIVATE

## 2018-09-08 PROCEDURE — 74011636637 HC RX REV CODE- 636/637: Performed by: INTERNAL MEDICINE

## 2018-09-08 RX ORDER — MORPHINE SULFATE 2 MG/ML
2 INJECTION, SOLUTION INTRAMUSCULAR; INTRAVENOUS
Status: DISCONTINUED | OUTPATIENT
Start: 2018-09-08 | End: 2018-09-11 | Stop reason: HOSPADM

## 2018-09-08 RX ORDER — ASPIRIN 81 MG/1
81 TABLET ORAL DAILY
Status: DISCONTINUED | OUTPATIENT
Start: 2018-09-10 | End: 2018-09-11 | Stop reason: HOSPADM

## 2018-09-08 RX ADMIN — BUDESONIDE 500 MCG: 0.5 INHALANT RESPIRATORY (INHALATION) at 19:21

## 2018-09-08 RX ADMIN — OXYCODONE HYDROCHLORIDE AND ACETAMINOPHEN 500 MG: 500 TABLET ORAL at 08:04

## 2018-09-08 RX ADMIN — Medication 10 ML: at 13:01

## 2018-09-08 RX ADMIN — CARVEDILOL 6.25 MG: 6.25 TABLET, FILM COATED ORAL at 08:04

## 2018-09-08 RX ADMIN — Medication 10 ML: at 21:38

## 2018-09-08 RX ADMIN — ALBUTEROL SULFATE 2.5 MG: 2.5 SOLUTION RESPIRATORY (INHALATION) at 19:21

## 2018-09-08 RX ADMIN — INSULIN LISPRO 2 UNITS: 100 INJECTION, SOLUTION INTRAVENOUS; SUBCUTANEOUS at 21:38

## 2018-09-08 RX ADMIN — FUROSEMIDE 80 MG: 20 TABLET ORAL at 16:57

## 2018-09-08 RX ADMIN — MORPHINE SULFATE 2 MG: 2 INJECTION, SOLUTION INTRAMUSCULAR; INTRAVENOUS at 08:50

## 2018-09-08 RX ADMIN — ASPIRIN 81 MG: 81 TABLET, COATED ORAL at 08:04

## 2018-09-08 RX ADMIN — FUROSEMIDE 80 MG: 20 TABLET ORAL at 08:04

## 2018-09-08 RX ADMIN — Medication 10 ML: at 06:05

## 2018-09-08 RX ADMIN — CARVEDILOL 6.25 MG: 6.25 TABLET, FILM COATED ORAL at 16:57

## 2018-09-08 RX ADMIN — SIMVASTATIN 20 MG: 20 TABLET, FILM COATED ORAL at 21:37

## 2018-09-08 RX ADMIN — ISOSORBIDE MONONITRATE 30 MG: 30 TABLET, EXTENDED RELEASE ORAL at 08:04

## 2018-09-08 RX ADMIN — ESCITALOPRAM OXALATE 20 MG: 10 TABLET ORAL at 08:04

## 2018-09-08 RX ADMIN — ONDANSETRON 4 MG: 2 INJECTION INTRAMUSCULAR; INTRAVENOUS at 08:50

## 2018-09-08 RX ADMIN — ALBUTEROL SULFATE 2.5 MG: 2.5 SOLUTION RESPIRATORY (INHALATION) at 13:24

## 2018-09-08 RX ADMIN — INSULIN LISPRO 2 UNITS: 100 INJECTION, SOLUTION INTRAVENOUS; SUBCUTANEOUS at 12:03

## 2018-09-08 NOTE — PROGRESS NOTES
Problem: Falls - Risk of 
Goal: *Absence of Falls Document Miguel Handy Fall Risk and appropriate interventions in the flowsheet. Outcome: Progressing Towards Goal 
Fall Risk Interventions: 
Mobility Interventions: Communicate number of staff needed for ambulation/transfer, Patient to call before getting OOB, PT Consult for mobility concerns Medication Interventions: Evaluate medications/consider consulting pharmacy, Patient to call before getting OOB, Teach patient to arise slowly Elimination Interventions: Call light in reach, Patient to call for help with toileting needs, Toilet paper/wipes in reach History of Falls Interventions: Consult care management for discharge planning, Evaluate medications/consider consulting pharmacy, Investigate reason for fall, Door open when patient unattended Problem: Pressure Injury - Risk of 
Goal: *Prevention of pressure injury Document Bahman Scale and appropriate interventions in the flowsheet. Outcome: Progressing Towards Goal 
Pressure Injury Interventions: 
  
 
Moisture Interventions: Absorbent underpads, Maintain skin hydration (lotion/cream) Activity Interventions: Increase time out of bed, Pressure redistribution bed/mattress(bed type), PT/OT evaluation Mobility Interventions: HOB 30 degrees or less, Pressure redistribution bed/mattress (bed type), PT/OT evaluation Nutrition Interventions: Document food/fluid/supplement intake Friction and Shear Interventions: HOB 30 degrees or less

## 2018-09-08 NOTE — CONSULTS
H&P/Consult Note/Progress Note/Office Note:  
Camila Ramirez  MRN: [id number]  JZS:[birth year]  Age:68 y.o. General Surgery Consult ordered by: Dr. Drake Farmer Reason for Consult:Eval left knee hematoma HPI: Camila Ramirez is a 77 y.o. female with PMH of probable MDS followed by hematology Dr. Kathie Robert, CKD 3, s/dCHF with ICD - EF 20-25%, chronic hypoxic respiratory failure on 3 L home O2, nocturnal trilogy use for REJI, DM2, HTN, and mechanical AVR on coumadin admitted by Hospitalist with acute on chronic anemia. She typically has monthly procrit injections and intermittent PRBC transfusions for worsening anemia. Had Bone marrow biopsy that was nondiagnostic however unable to rule out lower grade MDS. At the time of admission HGB 6.5 and 2 units PRBC given. Pt reports falling in the bathroom last Thursday with subsequent bruising and blisters x 2 to Left lower extremity. 9/7/18 Xray Left Knee Hx: Pain after falling Three views of the left knee were obtained 
  
FINDINGS:  There is pretibial soft tissue edema and swelling. There is no 
evidence of fracture or other acute bony abnormality. No bony lesions are seen. There is no joint effusion. 
  
IMPRESSION: No evidence of fracture 9/7/18: Head CT Hx:  Reason fall, increased confusion 
  
Multiple axial images obtained through the brain without intravenous contrast.  
Radiation dose reduction techniques were used for this study: All CT scans 
performed at this facility use one or all of the following: Automated exposure 
control, adjustment of the mA and/or kVp according to patient's size, iterative 
reconstruction. Compared with 04/28/2018. 
  
FINDINGS: There is stable bioccipital atrophy. There is also mild chronic 
change in the white matter. There is no CT evidence of acute hemorrhage or 
infarction. The ventricles are normal in size. There are no extra-axial fluid 
collections. No masses are seen. The sinuses are clear.  There are no bony lesions. 
  
IMPRESSION: No CT evidence of acute intracranial abnormality. 18: Resting in bed. C/o pain to Left knee and lower extremity. H/H 9.3/ 29.5. Creat 1.75. INR 3.4. Past Medical History:  
Diagnosis Date  Anticoagulated on Coumadin 2013 S/P AVR  CAD (coronary artery disease) 2013 PCI LAD with stent placed  Cardiomyopathy ([facility]  CHF (congestive heart failure) ([facility] [facility] 2017  CKD (chronic kidney disease) stage 3, GFR 30-59 ml/min 7/10/2013  COPD (chronic obstructive pulmonary disease) ([facility] [address] 2014  Essential hypertension, benign 2013  HLD (hyperlipidemia)  ICD (implantable cardioverter-defibrillator) in place 10/2/2014 Biotronik single-chamber ICD implantation 10/20/14  Iron deficiency anemia due to chronic blood loss 2009  MDS (myelodysplastic syndrome) ([facility] [address] 2011 Procrit started in 11 Procrit weekly and Iron stores. 12 good response to 3 weekly procrit did not need it last time  3-- Pt doing well. Just wanted a \"check-up. \" Responding to Procrit every three weeks. 13 patient has missed some injections on recently restarted hemoglobin only issue , takes oral iron iron stores each time  REJI (obstructive sleep apnea)-cpap 2014  Osteoarthritis  Osteopenia  Quadrantanopia  Visual complaint 2015 Past Surgical History:  
Procedure Laterality Date [address]    HX AORTIC VALVE REPLACEMENT  ,   
 mechanical valve   HX  SECTION    
 HX CORONARY STENT PLACEMENT  May, 2014 STEMI with Stent placement.  HX ENDOSCOPY  2009 EGD [address] CATHETERIZATION    HX PACEMAKER  10/2/2014 Biotronik ICD  HX TUBAL LIGATION    
 IR BX BONE MARROW DIAGNOSTIC  2011 Current Facility-Administered Medications Medication Dose Route Frequency  morphine injection 2 mg  2 mg IntraVENous Q4H PRN  
 [START ON 9/10/2018] aspirin chewable tablet 81 mg  81 mg Oral DAILY  0.9% sodium chloride infusion 250 mL  250 mL IntraVENous PRN  
 0.9% sodium chloride infusion  50 mL/hr IntraVENous CONTINUOUS  
 albuterol (PROVENTIL VENTOLIN) nebulizer solution 2.5 mg  2.5 mg Nebulization Q4H PRN  
 ascorbic acid (vitamin C) (VITAMIN C) tablet 500 mg  500 mg Oral DAILY  carvedilol (COREG) tablet 6.25 mg  6.25 mg Oral BID WITH MEALS  escitalopram oxalate (LEXAPRO) tablet 20 mg  20 mg Oral DAILY  furosemide (LASIX) tablet 80 mg  80 mg Oral BID  isosorbide mononitrate ER (IMDUR) tablet 30 mg  30 mg Oral DAILY  simvastatin (ZOCOR) tablet 20 mg  20 mg Oral QHS  tiotropium (SPIRIVA) inhalation capsule 18 mcg  18 mcg Inhalation DAILY  sodium chloride (NS) flush 5-10 mL  5-10 mL IntraVENous Q8H  
 sodium chloride (NS) flush 5-10 mL  5-10 mL IntraVENous PRN  
 acetaminophen (TYLENOL) tablet 650 mg  650 mg Oral Q6H PRN  
 ondansetron (ZOFRAN) injection 4 mg  4 mg IntraVENous Q4H PRN  
 insulin lispro (HUMALOG) injection   SubCUTAneous AC&HS  influenza vaccine 2018-19 (6 mos+)(PF) (FLUARIX QUAD/FLULAVAL QUAD) injection 0.5 mL  0.5 mL IntraMUSCular PRIOR TO DISCHARGE  budesonide (PULMICORT) 500 mcg/2 ml nebulizer suspension  500 mcg Nebulization BID RT And  
 albuterol (PROVENTIL VENTOLIN) nebulizer solution 2.5 mg  2.5 mg Nebulization Q6HWA RT  
 WARFARIN INFORMATION NOTE (COUMADIN)   Other Rx Dosing/Monitoring Sulfa (sulfonamide antibiotics) and Aspirin Social History Social History  Marital status:  Spouse name: N/A  
 Number of children: N/A  
 Years of education: N/A Occupational History   Retired  
  [employer] Social History Main Topics  Smoking status: Former Smoker Packs/day: 0.25 Years: 42.00 Types: Cigarettes Start date: 10/1/1956 Quit date: 5/1/2014  Smokeless tobacco: Never Used  Alcohol use No  
 Drug use: No  
 Sexual activity: Not Asked Other Topics Concern  Weight Concern Yes  Special Diet Yes Social History Narrative Lives with her daughter. Ambulates only short distances. History Smoking Status  Former Smoker  Packs/day: 0.25  
 Years: 42.00  Types: Cigarettes  Start date: 10/1/1956  Quit date: 5/1/2014 Smokeless Tobacco  
 Never Used Family History Problem Relation Age of Onset  Heart Disease Mother CHF  Hypertension Mother  Kidney Disease Mother  Heart Disease Father CHF  Lung Disease Father  Diabetes Father  Cancer Father   
  prostate  Hypertension Father  Heart Attack Father  Heart Disease Maternal Aunt  Diabetes Brother  Coronary Artery Disease Other  Breast Cancer Neg Hx   
 
ROS:  Comprehensive review of systems was otherwise unremarkable except as noted above. Physical Exam:  
Visit Vitals  /57 (BP 1 Location: Left arm, BP Patient Position: At rest)  Pulse 68  Temp 98.5 °F (36.9 °C)  Resp 17  Ht 5' 2\" (1.575 m)  Wt 226 lb 9.6 oz (102.8 kg)  SpO2 93%  BMI 41.45 kg/m2 Vitals:  
 09/08/18 1515 09/08/18 1922 09/08/18 1946 09/08/18 2121 BP: 130/63  109/57 Pulse: 69  68 Resp: 18  17 Temp: 96.6 °F (35.9 °C)  98.5 °F (36.9 °C) SpO2: 94% 95% 93% Weight:    226 lb 9.6 oz (102.8 kg) Height:      
 
09/08 1901 - 09/09 0700 In: 377 [I.V.:377] Out: -  
09/07 0701 - 09/08 1900 In: 906 [P.O.:560; I.V.:346] Out: 1250 [VBVME:1750] Constitutional: Alert, cooperative, no acute distress; appears stated age Eyes:Sclera are clear. EOMs intact ENMT: no external lesions gross hearing normal; no obvious neck masses, no ear or lip lesions, nares normal 
CV: RRR. Normal perfusion, Mild edema Resp: No JVD. Breathing is  non-labored; no audible wheezing. GI: soft, non-tender, non-distended Integument: diffuse bruising and fluid filled blister x 2 to Left anterior shin - 1 2x3cm approx, #2 6x8cm approx No cellulitis Musculoskeletal: unremarkable with normal function. No embolic signs or cyanosis. Neuro:  Oriented; moves all 4; no focal deficits Psychiatric: normal affect and mood, no memory impairment Recent vitals (if inpt): 
Patient Vitals for the past 24 hrs: 
 BP Temp Pulse Resp SpO2 Weight 09/08/18 2121 - - - - - 226 lb 9.6 oz (102.8 kg) 09/08/18 1946 109/57 98.5 °F (36.9 °C) 68 17 93 % -  
09/08/18 1922 - - - - 95 % -  
09/08/18 1515 130/63 96.6 °F (35.9 °C) 69 18 94 % -  
09/08/18 1325 - - - - 97 % -  
09/08/18 1111 120/53 98.2 °F (36.8 °C) 69 18 96 % -  
09/08/18 0711 144/79 98 °F (36.7 °C) 73 18 98 % -  
09/08/18 0520 132/61 99.2 °F (37.3 °C) 70 16 99 % -  
09/08/18 0430 137/68 97.8 °F (36.6 °C) 69 17 99 % -  
09/08/18 0338 - - - - 99 % -  
09/08/18 0330 129/62 97.7 °F (36.5 °C) 70 16 99 % -  
09/08/18 0231 122/62 99 °F (37.2 °C) 66 15 98 % -  
09/08/18 0200 125/56 98.8 °F (37.1 °C) 66 14 98 % -  
09/08/18 0100 117/59 98.1 °F (36.7 °C) 65 16 98 % -  
09/08/18 0001 121/57 97.7 °F (36.5 °C) 68 18 95 % - Labs: 
Recent Labs  
   09/08/18 
 0817  09/07/18 
 1752 WBC  7.2  6.9 HGB  9.3*  6.5*  
PLT  192  186 NA  140  136  
K  4.7  5.2*  
CL  96*  94* CO2  38*  40* BUN  62*  68* CREA  1.75*  2.19* GLU  93  139* PTP  32.6*  39.5* INR  3.4  4.3* TBILI   --   0.8 SGOT   --   11* ALT   --   17  
AP   --   48* Lab Results Component Value Date/Time  WBC 7.2 09/08/2018 08:17 AM  
 HGB 9.3 (L) 09/08/2018 08:17 AM  
 PLATELET 104 72/35/0480 08:17 AM  
 Sodium 140 09/08/2018 08:17 AM  
 Potassium 4.7 09/08/2018 08:17 AM  
 Chloride 96 (L) 09/08/2018 08:17 AM  
 CO2 38 (H) 09/08/2018 08:17 AM  
 BUN 62 (H) 09/08/2018 08:17 AM  
 Creatinine 1.75 (H) 09/08/2018 08:17 AM  
 Glucose 93 09/08/2018 08:17 AM  
 INR 3.4 09/08/2018 08:17 AM  
 aPTT 33.8 (H) 12/01/2016 02:20 AM  
 Bilirubin, total 0.8 09/07/2018 05:52 PM  
 AST (SGOT) 11 (L) 09/07/2018 05:52 PM  
 ALT (SGPT) 17 09/07/2018 05:52 PM  
 Alk. phosphatase 48 (L) 09/07/2018 05:52 PM  
 Amylase 88 01/31/2012 03:12 PM  
 Lipase 96 04/14/2014 02:40 PM  
 Lactic acid 0.7 02/16/2016 06:16 AM  
 Troponin-I <0.05 01/31/2012 03:32 PM  
 Troponin-I, Qt. 0.08 (H) 12/01/2016 02:20 AM  
 
 
 
I reviewed recent labs and recent radiologic studies. I independently reviewed radiology images for studies I described above or studies I have ordered. Admission date (for inpatients): 9/7/2018 * No surgery found *  * No surgery found * ASSESSMENT/PLAN: 
Problem List  Date Reviewed: 8/31/2018 Codes Class Noted [facility] INR >4 on admission 9/7/18 ICD-10-CM: D68.9 ICD-9-CM: 286.9  9/8/2018 Traumatic hematoma of left knee ICD-10-CM: V22.80BB ICD-9-CM: 924.11  9/8/2018 Debility ICD-10-CM: R53.81 ICD-9-CM: 799.3  9/8/2018 * (Principal)Anemia ICD-10-CM: D64.9 ICD-9-CM: 285.9  9/7/2018 Chronic respiratory failure with hypoxia (HCC) (Chronic) ICD-10-CM: J96.11 
ICD-9-CM: 518.83, 799.02  9/7/2018 Acute kidney injury superimposed on chronic kidney disease ([facility] [address] ICD-10-CM: N17.9, N18.9 ICD-9-CM: 866.00, 585.9  9/7/2018 Encounter for immunization ICD-10-CM: U72 ICD-9-CM: V03.89  8/21/2018 Obesity, morbid ([facility] [address] ICD-10-CM: E66.01 
ICD-9-CM: 278.01  6/8/2018 Physical debility ICD-10-CM: R53.81 ICD-9-CM: 799.3  5/2/2018 Closed nondisplaced fracture of shaft of fifth metacarpal bone of left hand ICD-10-CM: C38.706N ICD-9-CM: 815.03  4/28/2018 Subdural hematoma (HCC) ICD-10-CM: I62.00 ICD-9-CM: 432.1  4/27/2018 Long term (current) use of anticoagulants (Chronic) ICD-10-CM: Z79.01 
ICD-9-CM: V58.61  4/19/2018 Dysthymia ICD-10-CM: F34.1 ICD-9-CM: 300.4  4/5/2018 Type 2 diabetes mellitus with nephropathy (HCC) (Chronic) ICD-10-CM: E11.21 
ICD-9-CM: 250.40, 583.81  12/18/2017 Pulmonary hypertension (HCC) (Chronic) ICD-10-CM: I27.20 ICD-9-CM: 416.8  6/15/2016 S/P AVR (aortic valve replacement) (Chronic) ICD-10-CM: Z95.2 ICD-9-CM: V43.3  Unknown Overview Signed 2/23/2016 11:04 AM by Timothy Solomon Mechanical/Artific Cardiomyopathy ([facility] [address] (Chronic) ICD-10-CM: I42.9 ICD-9-CM: 425.4  Unknown Osteopenia ICD-10-CM: M85.80 ICD-9-CM: 733.90  Unknown HLD (hyperlipidemia) (Chronic) ICD-10-CM: A98.3 ICD-9-CM: 272.4  Unknown Osteoarthritis ICD-10-CM: M19.90 ICD-9-CM: 715.90  Unknown  
   
 ICD (implantable cardioverter-defibrillator) in place ICD-10-CM: Z95.810 ICD-9-CM: V45.02  10/2/2014 Overview Signed 10/2/2014  4:58 PM by Aimee Alvarez III Biotronik single-chamber ICD implantation 10/20/14 Diabetes mellitus type 2, diet-controlled (HCC) (Chronic) ICD-10-CM: E11.9 ICD-9-CM: 250.00  8/28/2014 COPD (chronic obstructive pulmonary disease) (HCC) (Chronic) ICD-10-CM: J44.9 ICD-9-CM: 484  4/2/2014 REJI (obstructive sleep apnea)-cpap (Chronic) ICD-10-CM: G47.33 
ICD-9-CM: 327.23  4/2/2014 CKD (chronic kidney disease) stage 3, GFR 30-59 ml/min (Chronic) ICD-10-CM: N18.3 ICD-9-CM: 585.3  7/10/2013 Anticoagulated on Coumadin (Chronic) ICD-10-CM: Z51.81, Z79.01 
ICD-9-CM: V58.83, V58.61  7/9/2013 Overview Signed 7/9/2013  4:16 PM by Diana Aguirre NP  
  S/P AVR 
  
  
   
 CAD (coronary artery disease) (Chronic) ICD-10-CM: I25.10 ICD-9-CM: 414.00  1/20/2013 Overview Signed 5/20/2014 10:05 AM by Nallely Hamilton NP  
  5/8/14 PCI LAD with stent placed Chronic combined systolic and diastolic heart failure (HCC) (Chronic) ICD-10-CM: I50.42 
ICD-9-CM: 428.42  1/20/2013 Overview Addendum 4/28/2018  4:53 AM by Nakul Root MD  
  5/8/14 ECHO:  EF 10-15% 12/2017:  EF 25-30% Essential hypertension, benign (Chronic) ICD-10-CM: I10 
ICD-9-CM: 401.1  1/20/2013 MDS (myelodysplastic syndrome) (HCC) (Chronic) ICD-10-CM: D46.9 ICD-9-CM: 238.75  12/17/2011 Overview Addendum 8/29/2013 11:06 AM by Nemesio Leung Procrit started in August, 2011 12/18/11 Procrit weekly and Iron stores. 5-12 12-13-12 good response to 3 weekly procrit did not need it last time 3-7-13 Pt doing well. Just wanted a \"check-up. \" Responding to Procrit every three weeks. 8-29-13 patient has missed some injections on recently restarted hemoglobin only issue , takes oral iron iron stores each time Iron deficiency anemia due to chronic blood loss (Chronic) ICD-10-CM: D50.0 ICD-9-CM: 280.0  7/29/2009 Principal Problem: Anemia (9/7/2018) Active Problems: 
  [facility]elod[facility] (12/17/2011) Overview: Procrit started in August, 2011 12/18/11 Procrit weekly and Iron stores. 5-12 12-13-12 good response to 3 weekly procrit did not need it last time 3-7-13 Pt doing well. Just wanted a \"check-up. \" Responding to Procrit  
    every three weeks. 8-29-13 patient has missed some injections on recently restarted  
    hemoglobin only issue , takes oral iron iron stores each time CAD (coronary artery disease) (1/20/2013) Overview: 5/8/14 PCI LAD with stent placed Chronic combined systolic and diastolic heart failure ([facility] [address] (1/20/2013) Overview: 5/8/14 ECHO:  EF 10-15% 12/2017:  EF 25-30% Essential hypertension, benign (1/20/2013) Anticoagulated on Coumadin (7/9/2013) Overview: S/P AVR 
 
  CKD (chronic kidney disease) stage 3, GFR 30-59 ml/min (7/10/2013) COPD (chronic obstructive pulmonary disease) ([facility] [address] (4/2/2014) REJI (obstructive sleep apnea)-cpap (4/2/2014) Diabetes mellitus type 2, diet-controlled ([facility] (8/28/2014) ICD (implantable cardioverter-defibrillator) in place (10/2/2014) Overview: Biotronik single-chamber ICD implantation 10/20/14 S/P AVR (aortic valve replacement) () Overview: Mechanical/Artific Obesity, morbid ([facility] [address] (6/8/2018) Chronic respiratory failure with hypoxia ([address] (9/7/2018) Acute kidney injury superimposed on chronic kidney disease ([address] (9/7/2018) Coagulopathy ([address] INR >4 on admission 9/7/18 (9/8/2018) Traumatic hematoma of left knee (9/8/2018) Debility (9/8/2018) Plan: 
Care management per Hospitalist 
Follow labs INR 3.4, Pharmacy consulted for dosing with goal INR 2.5 to 3.5, followup daily INR Further orders per Dr. Jennifer Newton Signed:  OMARI Saravia-BC I have seen and examined the patient with the Nurse Practitioner and agree with the above assessment and plan. Will follow left knee injury No cellulitis at present Coagulopathic at present with coumadin on hold Debility/Falls-->Recommend SNF/rehab after hospitalization Rina Huitron MD

## 2018-09-08 NOTE — H&P
Hospitalist H&P Note Admit Date:  2018  5:55 PM  
Name:  Yaneth Rey Age:  77 y.o. 
:  1950 MRN:  [id number] PCP:  Janet Najera MD 
Treatment Team: Attending Provider: Hernan Kirkpatrick MD 
 
HPI:  
 
CC: anemia Ms. Diaz is a 76 yo female with PMH of probable MDS followed by hematology Dr. Scott Cadet, CKD 3, s/dCHF with ICD - EF 20-25%, chronic hypoxic respiratory failure on 3 L home O2, nocturnal trilogy use for REJI, DM2, HTN, and mechanical AVR on coumadin evaluated with acute on chronic anemia. She typically has monthly procrit injections and intermittent PRBC transfusions for worsening anemia. Had Bone marrow biopsy that was nondiagnostic however unable to rule out lower grade MDS. She denies oly GI blood loss and I am not able to locate GI scope reports. Current HGB 6.5 and 2 units PRBC ordered per ED. She did sustain a fall in the bathroom several weeks ago with subsequent bruising and blisters to LE  
 
10 systems reviewed and negative except as noted in HPI. - has vision changes, chest pain and dyspnea, OA, easy bruising, mood and memory loss Past Medical History:  
Diagnosis Date  Anticoagulated on Coumadin 2013 S/P AVR  CAD (coronary artery disease) 2013 PCI LAD with stent placed  Cardiomyopathy ([facility] [address]  CHF (congestive heart failure) ([facility] [address] 2017  CKD (chronic kidney disease) stage 3, GFR 30-59 ml/min 7/10/2013  COPD (chronic obstructive pulmonary disease) ([facility] [address] 2014  Essential hypertension, benign 2013  HLD (hyperlipidemia)  ICD (implantable cardioverter-defibrillator) in place 10/2/2014 Biotronik single-chamber ICD implantation 10/20/14  Iron deficiency anemia due to chronic blood loss 2009  MDS (myelodysplastic syndrome) ([facility] [address] 2011 Procrit started in 11 Procrit weekly and Iron stores. 12 good response to 3 weekly procrit did not need it last time  3-7-13 Pt doing well. Just wanted a \"check-up. \" Responding to Procrit every three weeks. 13 patient has missed some injections on recently restarted hemoglobin only issue , takes oral iron iron stores each time  REJI (obstructive sleep apnea)-cpap 2014  Osteoarthritis  Osteopenia  Quadrantanopia  Visual complaint 2015 Past Surgical History:  
Procedure Laterality Date [address]    HX AORTIC VALVE REPLACEMENT  ,   
 mechanical valve   HX  SECTION    
 HX CORONARY STENT PLACEMENT  May, 2014 STEMI with Stent placement.  HX ENDOSCOPY  2009 EGD [address] CATHETERIZATION    HX PACEMAKER  10/2/2014 Biotronik ICD  HX TUBAL LIGATION    
 IR BX BONE MARROW DIAGNOSTIC  2011 Allergies Allergen Reactions  Sulfa (Sulfonamide Antibiotics) Hives  Aspirin Nausea Only Cannot take uncoated aspirin Social History Substance Use Topics  Smoking status: Former Smoker Packs/day: 0.25 Years: 42.00 Types: Cigarettes Start date: 10/1/1956 Quit date: 2014  Smokeless tobacco: Never Used  Alcohol use No  
  
Family History Problem Relation Age of Onset  Heart Disease Mother CHF  Hypertension Mother  Kidney Disease Mother  Heart Disease Father CHF  Lung Disease Father  Diabetes Father  Cancer Father   
  prostate  Hypertension Father  Heart Attack Father  Heart Disease Maternal Aunt  Diabetes Brother  Coronary Artery Disease Other  Breast Cancer Neg Hx Immunization History Administered Date(s) Administered  Influenza High Dose Vaccine PF 10/01/2016, 2017  Influenza Vaccine 2013, 10/01/2014, 2015  Pneumococcal Conjugate (PCV-13) 2015  Pneumococcal Vaccine (Unspecified Type) 2013  TB Skin Test (PPD) Intradermal 2014, 2018 PTA Medications: 
Prior to Admission Medications Prescriptions Last Dose Informant Patient Reported? Taking? OXYGEN-AIR DELIVERY SYSTEMS   Yes No  
Sig: 3 L by Nasal route continuous. acetaminophen (TYLENOL) 325 mg tablet   Yes No  
Sig: Take 650 mg by mouth every four (4) hours as needed for Pain. albuterol (PROVENTIL VENTOLIN) 2.5 mg /3 mL (0.083 %) nebulizer solution   No No  
Sig: 3 mL by Nebulization route every four (4) hours as needed for Wheezing. albuterol (VENTOLIN HFA) 90 mcg/actuation inhaler   No No  
Si puffs qid prn  
ascorbic acid, vitamin C, (VITAMIN C) 500 mg tablet   Yes No  
Sig: Take 500 mg by mouth daily. aspirin delayed-release 81 mg tablet   Yes No  
Sig: Take 81 mg by mouth daily. calcium citrate 200 mg (950 mg) tablet   Yes No  
Sig: Take 200 mg by mouth two (2) times a day. carvedilol (COREG) 3.125 mg tablet   No No  
Sig: Take 2 Tabs by mouth two (2) times daily (with meals). cholecalciferol, VITAMIN D3, (VITAMIN D3) 5,000 unit tab tablet   No No  
Sig: Take 1 Tab by mouth daily. escitalopram oxalate (LEXAPRO) 20 mg tablet   No No  
Sig: TAKE 1 TAB BY MOUTH DAILY. INDICATIONS: ANXIETY WITH DEPRESSION  
ferrous gluconate 324 mg (38 mg iron) tablet   No No  
Sig: TAKE 1 TAB BY MOUTH DAILY (BEFORE BREAKFAST). INDICATIONS: IRON DEFICIENCY ANEMIA Patient taking differently: two (2) times a day. TAKE 1 TAB BY MOUTH DAILY (BEFORE BREAKFAST). INDICATIONS: IRON DEFICIENCY ANEMIA Takes twice a day  
fluticasone (FLONASE) 50 mcg/actuation nasal spray   No No  
Si Sprays by Both Nostrils route daily as needed. furosemide (LASIX) 40 mg tablet   No No  
Sig: Take 2 Tabs by mouth two (2) times a day. isosorbide mononitrate ER (IMDUR) 30 mg tablet   No No  
Sig: TAKE 1 TAB BY MOUTH EVERY MORNING. loratadine (CLARITIN) 10 mg tablet   No No  
Sig: TAKE 1 TAB BY MOUTH DAILY. Patient taking differently: TAKE 1 TAB BY MOUTH DAILY PRN  
mometasone-formoterol (DULERA) 200-5 mcg/actuation HFA inhaler   No No  
Si puffs bid, rinse mouth after use.  
multivit-minerals/folic acid (ADULT ONE DAILY MULTIVITAMIN PO)   Yes No  
Sig: Take 1 Tab by mouth daily. nitroglycerin (NITROSTAT) 0.4 mg SL tablet  Self Yes No  
Si.4 mg by SubLINGual route every five (5) minutes as needed. sacubitril-valsartan (ENTRESTO) 24 mg/26 mg tablet   No No  
Sig: Take 1 Tab by mouth two (2) times a day. INDICATIONS: CHRONIC HEART FAILURE  
simvastatin (ZOCOR) 20 mg tablet   No No  
Sig: TAKE 1 TABLET BY MOUTH EVERY DAY  
spironolactone (ALDACTONE) 25 mg tablet   No No  
Sig: Take 1 Tab by mouth two (2) times a day. Pill bottles states 3.5 daily but patient is taking one in am and one in pm  
traMADol (ULTRAM) 50 mg tablet   No No  
Sig: TAKE 1 TABLET BY MOUTH EVERY 4 HOURS AS NEEDED  
umeclidinium (INCRUSE ELLIPTA) 62.5 mcg/actuation inhaler   No No  
Sig: Take 1 Puff by inhalation daily. Indications: j44.9  
warfarin (COUMADIN) 2.5 mg tablet   No No  
Sig: Take 1 Tab by mouth two (2) days a week. Patient taking differently: Take 1 Tab by mouth two (2) days a week. Monday and Friday  
warfarin (COUMADIN) 5 mg tablet   No No  
Sig: Take 1 Tab by mouth five (5) days a week. Patient taking differently: Take 1 Tab by mouth five (5) days a week. , Tuesday, Thursday, Friday. Saturday Facility-Administered Medications: None Objective:  
Patient Vitals for the past 24 hrs: 
 Temp Pulse Resp BP SpO2  
18 - 65 18 117/56 100 % 18 1830 - - - 105/55 -  
18 - - - 115/57 -  
18 1745 98.1 °F (36.7 °C) 64 18 116/53 99 % Oxygen Therapy O2 Sat (%): 100 % (18) O2 Device: Nasal cannula (18) O2 Flow Rate (L/min): 3 l/min (18) No intake or output data in the 24 hours ending 18 4598 Physical Exam: General:    Alert. No distress, elderly Eyes:   Normal sclera. Extraocular movements intact. PERRLA 
ENT:  Normocephalic, atraumatic. Moist mucous membranes, mallampatti III  
CV:   RRR. Systolic murmur,  1+  edema Lungs:  CTAB. No wheezing, rhonchi, or rales. Abdomen: Soft, nontender, nondistended. Present BS Extremities: Warm and dry. .diffuse bruising and large blood filled blister to Left anterior shin Neurologic:  grossly intact. Skin:     No rashes or jaundice. Normal coloration Psych:  Normal mood and affect. I reviewed the labs, imaging, EKGs, telemetry, and other studies done this admission. EKG: tracing personally reviewed as NSR Data Review:  
Recent Results (from the past 24 hour(s)) CBC WITH AUTOMATED DIFF Collection Time: 09/07/18  5:52 PM  
Result Value Ref Range WBC 6.9 4.3 - 11.1 K/uL  
 RBC 2.23 (L) 4.05 - 5.2 M/uL HGB 6.5 (LL) 11.7 - 15.4 g/dL HCT 22.0 (L) 35.8 - 46.3 % MCV 98.7 (H) 79.6 - 97.8 FL  
 MCH 29.1 26.1 - 32.9 PG  
 MCHC 29.5 (L) 31.4 - 35.0 g/dL  
 RDW 14.6 % PLATELET 475 481 - 263 K/uL MPV 10.2 9.4 - 12.3 FL ABSOLUTE NRBC 0.00 0.0 - 0.2 K/uL  
 DF AUTOMATED NEUTROPHILS 74 43 - 78 % LYMPHOCYTES 17 13 - 44 % MONOCYTES 8 4.0 - 12.0 % EOSINOPHILS 1 0.5 - 7.8 % BASOPHILS 0 0.0 - 2.0 % IMMATURE GRANULOCYTES 0 0.0 - 5.0 %  
 ABS. NEUTROPHILS 5.1 1.7 - 8.2 K/UL  
 ABS. LYMPHOCYTES 1.2 0.5 - 4.6 K/UL  
 ABS. MONOCYTES 0.6 0.1 - 1.3 K/UL  
 ABS. EOSINOPHILS 0.1 0.0 - 0.8 K/UL  
 ABS. BASOPHILS 0.0 0.0 - 0.2 K/UL  
 ABS. IMM. GRANS. 0.0 0.0 - 0.5 K/UL METABOLIC PANEL, COMPREHENSIVE Collection Time: 09/07/18  5:52 PM  
Result Value Ref Range Sodium 136 136 - 145 mmol/L Potassium 5.2 (H) 3.5 - 5.1 mmol/L Chloride 94 (L) 98 - 107 mmol/L  
 CO2 40 (H) 21 - 32 mmol/L Anion gap 2 (L) 7 - 16 mmol/L Glucose 139 (H) 65 - 100 mg/dL BUN 68 (H) 8 - 23 MG/DL  Creatinine 2.19 (H) 0.6 - 1.0 MG/DL  
 GFR est AA 29 (L) >60 ml/min/1.73m2 GFR est non-AA 24 (L) >60 ml/min/1.73m2 Calcium 9.2 8.3 - 10.4 MG/DL Bilirubin, total 0.8 0.2 - 1.1 MG/DL  
 ALT (SGPT) 17 12 - 65 U/L  
 AST (SGOT) 11 (L) 15 - 37 U/L Alk. phosphatase 48 (L) 50 - 136 U/L Protein, total 6.9 6.3 - 8.2 g/dL Albumin 3.2 3.2 - 4.6 g/dL Globulin 3.7 (H) 2.3 - 3.5 g/dL A-G Ratio 0.9 (L) 1.2 - 3.5 PROTHROMBIN TIME + INR Collection Time: 09/07/18  5:52 PM  
Result Value Ref Range Prothrombin time 39.5 (H) 11.5 - 14.5 sec INR 4.3 (HH)    
EKG, 12 LEAD, INITIAL Collection Time: 09/07/18  6:10 PM  
Result Value Ref Range Ventricular Rate 67 BPM  
 Atrial Rate 67 BPM  
 P-R Interval 196 ms QRS Duration 134 ms Q-T Interval 404 ms QTC Calculation (Bezet) 426 ms Calculated P Axis 48 degrees Calculated R Axis -53 degrees Calculated T Axis 105 degrees Diagnosis    
  !! AGE AND GENDER SPECIFIC ECG ANALYSIS !! Normal sinus rhythm Left axis deviation Non-specific intra-ventricular conduction block Inferior infarct (cited on or before 29-JAN-2000) Possible Anterolateral infarct , age undetermined Abnormal ECG When compared with ECG of 28-APR-2018 00:55, 
Premature ventricular complexes are no longer Present Borderline criteria for Anterolateral infarct are now Present TYPE + CROSSMATCH Collection Time: 09/07/18  6:37 PM  
Result Value Ref Range Crossmatch Expiration 09/10/2018 ABO/Rh(D) A POSITIVE Antibody screen NEG Unit number [id number] Blood component type RC LRIR Unit division 00 Status of unit ALLOCATED Crossmatch result Compatible All Micro Results Procedure Component Value Units Date/Time INFLUENZA A & B AG (RAPID TEST) [[id number]] Order Status:  Sent Specimen:  Nasopharyngeal   
  
 
 
Other Studies: 
Ct Head Wo Cont Result Date: 9/7/2018 Head CT INDICATION:  Reason fall, increased confusion Multiple axial images obtained through the brain without intravenous contrast. Radiation dose reduction techniques were used for this study: All CT scans performed at this facility use one or all of the following: Automated exposure control, adjustment of the mA and/or kVp according to patient's size, iterative reconstruction. Compared with 04/28/2018. FINDINGS: There is stable bioccipital atrophy. There is also mild chronic change in the white matter. There is no CT evidence of acute hemorrhage or infarction. The ventricles are normal in size. There are no extra-axial fluid collections. No masses are seen. The sinuses are clear. There are no bony lesions. IMPRESSION: No CT evidence of acute intracranial abnormality. Xr Chest [facility] Result Date: 9/7/2018 Chest X-ray INDICATION:   Shortness of breath, recent fall A portable AP view of the chest was obtained. FINDINGS: The lungs are clear. There are no infiltrates or effusions. There is cardiomegaly. Pacemaker and sternotomy changes are present. IMPRESSION: No acute findings in the chest  
 
Xr Knee Lt 3 V Result Date: 9/7/2018 Left Knee INDICATION: Pain after falling Three views of the left knee were obtained FINDINGS:  There is pretibial soft tissue edema and swelling. There is no evidence of fracture or other acute bony abnormality. No bony lesions are seen. There is no joint effusion. IMPRESSION: No evidence of fracture Assessment and Plan:  
 
Hospital Problems as of 9/7/2018  Date Reviewed: 8/31/2018 Codes Class Noted - Resolved POA * (Principal)Anemia ICD-10-CM: D64.9 ICD-9-CM: 285.9  9/7/2018 - Present Unknown Chronic respiratory failure with hypoxia (HCC) (Chronic) ICD-10-CM: J96.11 
ICD-9-CM: 518.83, 799.02  9/7/2018 - Present Yes Acute kidney injury superimposed on chronic kidney disease ([facility] [address] ICD-10-CM: N17.9, N18.9 ICD-9-CM: 866.00, 585.9  9/7/2018 - Present Yes Obesity, morbid ([facility] [address] ICD-10-CM: E66.01 
ICD-9-CM: 278.01  6/8/2018 - Present Yes S/P AVR (aortic valve replacement) (Chronic) ICD-10-CM: Z95.2 ICD-9-CM: V43.3  Unknown - Present Yes Overview Signed 2/23/2016 11:04 AM by Roverto Espinosa Mechanical/Artific 
  
  
   
 ICD (implantable cardioverter-defibrillator) in place ICD-10-CM: Z95.810 ICD-9-CM: V45.02  10/2/2014 - Present Yes Overview Signed 10/2/2014  4:58 PM by Anshul Brooke III Biotronik single-chamber ICD implantation 10/20/14 Diabetes mellitus type 2, diet-controlled (HCC) (Chronic) ICD-10-CM: E11.9 ICD-9-CM: 250.00  8/28/2014 - Present Yes COPD (chronic obstructive pulmonary disease) (HCC) (Chronic) ICD-10-CM: J44.9 ICD-9-CM: 196  4/2/2014 - Present Yes  
   
 REJI (obstructive sleep apnea)-cpap (Chronic) ICD-10-CM: B09.16 
ICD-9-CM: 327.23  4/2/2014 - Present Yes  
   
 CKD (chronic kidney disease) stage 3, GFR 30-59 ml/min (Chronic) ICD-10-CM: N18.3 ICD-9-CM: 585.3  7/10/2013 - Present Yes Anticoagulated on Coumadin (Chronic) ICD-10-CM: Z51.81, Z79.01 
ICD-9-CM: V58.83, V58.61  7/9/2013 - Present Yes Overview Signed 7/9/2013  4:16 PM by Alayna Dominguez NP  
  S/P AVR 
  
  
   
 CAD (coronary artery disease) (Chronic) ICD-10-CM: I25.10 ICD-9-CM: 414.00  1/20/2013 - Present Yes Overview Signed 5/20/2014 10:05 AM by Adriana Cisneros NP  
  5/8/14 PCI LAD with stent placed Chronic combined systolic and diastolic heart failure (HCC) (Chronic) ICD-10-CM: I50.42 
ICD-9-CM: 428.42  1/20/2013 - Present Yes Overview Addendum 4/28/2018  4:53 AM by Garland Tamayo MD  
  5/8/14 ECHO:  EF 10-15% 12/2017:  EF 25-30% Essential hypertension, benign (Chronic) ICD-10-CM: I10 
ICD-9-CM: 401.1  1/20/2013 - Present Yes  
   
 MDS (myelodysplastic syndrome) (HCC) (Chronic) ICD-10-CM: D46.9 ICD-9-CM: 238.75  12/17/2011 - Present Yes Overview Addendum 8/29/2013 11:06 AM by Corwin León Procrit started in August, 2011 12/18/11 Procrit weekly and Iron stores. 5-12 12-13-12 good response to 3 weekly procrit did not need it last time 3-7-13 Pt doing well. Just wanted a \"check-up. \" Responding to Procrit every three weeks. 8-29-13 patient has missed some injections on recently restarted hemoglobin only issue , takes oral iron iron stores each time · Acute on chronic anemia: with prior heme workup, concerning for lower grade MDS, no GI complaints, will transfuse 2 units PRBC and trend labs, continue chronic monthly procrit · s/dCHF: compensated, continue home medications holding entresto and aldactone with mild hyperkalemia · DM2: diet controlled, SSI · HTN: home regimen · Chronic hypoxic respiratory failure, COPD and REJI: on daytime 3 L NC and home trilogy (family will bring machine) , continue nebs and spiriva · Mechanical AVR: INR 4.3, hold coumadin and consult pharmacy for dosing with goal INR 2.5 to 3.5, followup daily INR · FELIZ on CKD with mild hyperkalemia: followup BMP, gentle IVF and avoid volume overload Discharge planning:  Pending to home, PT/OT 
DVT ppx: INR 4.3 Code status:  Full Estimated LOS:  Greater than 2 midnights Risk:  high Care plan: miesha Shelley [phone] Signed: Sury Zhao MD

## 2018-09-08 NOTE — PROGRESS NOTES
PT Note:  PT orders received and chart review initiated. Attempted PT evaluation. Patient declined wants to rest.  Will attempt another time/day as schedule permits.   Karen Zamudio, PT, DPT

## 2018-09-08 NOTE — PROGRESS NOTES
Warfarin dosing per pharmacist 
 
Bridgette Mcgowan Marycarmen Little is a 77 y.o. female. Height: 5' 2\" (157.5 cm)    Weight: 102.9 kg (226 lb 12.8 oz) Indication:  S/p AVR Goal INR:  2-3 Home dose:  5 mg Sun and 2.5 mg ROW Risk factors or significant drug interactions:  none Other anticoagulants:  none Daily Monitoring Date  INR     Warfarin dose HGB              Notes 9/8  3.4  Hold  9.3 Pharmacy has been consulted to dose warfarin for this patient due to a history of AVR (mechanical). The INR goal per the patient's home warfarin clinic is 2-3. Patient initially presents with a supratherapeutic INR though it is trending down. Will continue holding warfarin for now due to supratherapeutic level. Expect to be able to restart warfarin tomorrow should INR continue to decline. Daily INR Thank you, Sarthak Engel, PharmD, BCPS Clinical Pharmacist 
[phone]

## 2018-09-08 NOTE — PROGRESS NOTES
Pt's home trilogy kept alarming. After trouble shooting, there was no success. Switched pt on a hospital BIPAP with the exact settings as her home trilogy for the night. Pt not in distress and no issues. Marcial Jang 574 to come in and fix pt's home trilogy.

## 2018-09-08 NOTE — PROGRESS NOTES
TRANSFER - IN REPORT: 
 
Verbal report received from DEWAYNE Waters on [employer]  being received from ED for routine progression of care Report consisted of patients Situation, Background, Assessment and  
Recommendations(SBAR). Information from the following report(s) SBAR, ED Summary, [facility] and Recent Results was reviewed with the receiving nurse. Opportunity for questions and clarification was provided. Assessment completed upon patients arrival to unit and care assumed.

## 2018-09-08 NOTE — ED NOTES
TRANSFER - OUT REPORT: 
 
Verbal report given to Rima(name) on [facility] [employer] [facility]  being transferred to [facility](unit) for routine progression of care Report consisted of patients Situation, Background, Assessment and  
Recommendations(SBAR). Information from the following report(s) SBAR was reviewed with the receiving nurse. Lines:  
Peripheral IV 09/07/18 Left Antecubital (Active) Site Assessment Clean, dry, & intact 9/7/2018  5:56 PM  
Phlebitis Assessment 0 9/7/2018  5:56 PM  
Infiltration Assessment 0 9/7/2018  5:56 PM  
Dressing Status Clean, dry, & intact 9/7/2018  5:56 PM  
  
 
Opportunity for questions and clarification was provided. Patient transported with: 
 Spotted

## 2018-09-08 NOTE — PROGRESS NOTES
END OF SHIFT NOTE: 
 
Intake/Output Voiding: YES Catheter: NO 
Drain:   
 
 
 
 
 
Stool:  0 occurrences. Emesis:  0 occurrences. VITAL SIGNS Patient Vitals for the past 12 hrs: 
 Temp Pulse Resp BP SpO2  
09/08/18 0520 99.2 °F (37.3 °C) 70 16 132/61 99 % 09/08/18 0430 97.8 °F (36.6 °C) 69 17 137/68 99 % 09/08/18 0338 - - - - 99 % 09/08/18 0330 97.7 °F (36.5 °C) 70 16 129/62 99 % 09/08/18 0231 99 °F (37.2 °C) 66 15 122/62 98 % 09/08/18 0200 98.8 °F (37.1 °C) 66 14 125/56 98 % 09/08/18 0100 98.1 °F (36.7 °C) 65 16 117/59 98 % 09/08/18 0001 97.7 °F (36.5 °C) 68 18 121/57 95 % 09/07/18 2301 98.6 °F (37 °C) 72 18 114/56 96 % 09/07/18 2300 - - - - 93 % 09/07/18 2252 - - - - 94 % 09/07/18 2246 98.7 °F (37.1 °C) 69 17 112/64 96 % 09/07/18 2233 - - - - 93 % 09/07/18 2216 98.2 °F (36.8 °C) 73 18 159/66 94 % 09/07/18 1921 - 65 18 117/56 100 % Pain Assessment Pain 1 Pain Scale 1: Numeric (0 - 10) (09/07/18 1745) Pain Intensity 1: 7 (09/07/18 1745) Patient Stated Pain Goal: 0 (09/07/18 1745) Ambulating No 
 
Additional Information:  
Pt received x2 units of PRBC after admit to floor. Pt tolerated well. Pt and daughter concerned w/left knee blood blister and increase in size. Daughter inquiring about having it drained. Pt resting quietly. No visible s/sx of distress. No needs/concerns voiced at this time. Shift report given to oncoming nurse at the bedside.  
 
Gricelda Osei RN

## 2018-09-08 NOTE — PROGRESS NOTES
Problem: Falls - Risk of 
Goal: *Absence of Falls Document Reed Irvin Fall Risk and appropriate interventions in the flowsheet. Outcome: Progressing Towards Goal 
Fall Risk Interventions: 
Mobility Interventions: Communicate number of staff needed for ambulation/transfer, Patient to call before getting OOB, PT Consult for mobility concerns Medication Interventions: Evaluate medications/consider consulting pharmacy, Patient to call before getting OOB, Teach patient to arise slowly Elimination Interventions: Call light in reach, Patient to call for help with toileting needs, Toilet paper/wipes in reach History of Falls Interventions: Consult care management for discharge planning, Door open when patient unattended, Evaluate medications/consider consulting pharmacy, Investigate reason for fall Problem: Pressure Injury - Risk of 
Goal: *Prevention of pressure injury Document Bahman Scale and appropriate interventions in the flowsheet. Outcome: Progressing Towards Goal 
Pressure Injury Interventions: 
  
 
Moisture Interventions: Absorbent underpads, Maintain skin hydration (lotion/cream) Activity Interventions: Increase time out of bed, Pressure redistribution bed/mattress(bed type), PT/OT evaluation Mobility Interventions: HOB 30 degrees or less, Pressure redistribution bed/mattress (bed type), PT/OT evaluation Nutrition Interventions: Document food/fluid/supplement intake Friction and Shear Interventions: HOB 30 degrees or less

## 2018-09-08 NOTE — PROGRESS NOTES
09/07/18 2205 Skin Integumentary Skin Color Ecchymosis (comment) (bruising of left leg and left hip) Skin Condition/Temp Warm;Dry Skin Integrity Blister (blood blister of left knee) Turgor Non-tenting Pressure  Injury Documentation No Pressure Injury Noted-Pressure Ulcer Prevention Initiated Bruising of left hip from hip to mid lower back/hip Excoriation and bruising of left leg below and just above the knee.  
Old cardiac surgery scar mid and left chest.

## 2018-09-08 NOTE — PROGRESS NOTES
0620-Bedside report received from Uzma Leger RN. Resting in bed. No needs voiced. No s/s of acute distress. 0825-Nurse spoke with Dr. Drake Farmer regarding pt's complaint of pain level of 8 in left knee where hematoma is. He stated to order morphine 2 mg q4hr PRN for severe pain. 1810-END OF SHIFT NOTE: 
Pt's VSS and is in no acute distress. Pt seeing gen surg for hematoma to L knee. Intake/Output 09/08 0701 - 09/08 1900 In: 906 [P.O.:560; I.V.:346] Out: 1250 [[id number]] Voiding: YES Catheter: NO 
Drain:   
 
Stool:  0 occurrences. Emesis:  0 occurrences. VITAL SIGNS Patient Vitals for the past 12 hrs: 
 Temp Pulse Resp BP SpO2  
09/08/18 1515 96.6 °F (35.9 °C) 69 18 130/63 94 % 09/08/18 1325 - - - - 97 % 09/08/18 1111 98.2 °F (36.8 °C) 69 18 120/53 96 % 09/08/18 0711 98 °F (36.7 °C) 73 18 144/79 98 % Pain Assessment Pain 1 Pain Scale 1: Visual (09/08/18 1407) Pain Intensity 1: 0 (09/08/18 1407) Patient Stated Pain Goal: 0 (09/07/18 1745) Pain Reassessment 1: Yes (09/08/18 0919) Pain Location 1: Knee (09/08/18 0830) Pain Orientation 1: Left (09/08/18 0830) Pain Description 1: Aching (09/08/18 0830) Pain Intervention(s) 1: Medication (see MAR) (09/08/18 0830) Ambulating Yes Tahira Huber 1850-Bedside shift change report given to Brennan Rodrigez RN (oncoming nurse) by Howard Newman RN (offgoing nurse). Report included the following information SBAR, Kardex, Intake/Output, MAR and Recent Results.

## 2018-09-08 NOTE — ED NOTES
Pt soiled in bed, linens changed, VSS, reap easy, IVF running, family at bedside, pt denies any needs at this time.

## 2018-09-08 NOTE — ED PROVIDER NOTES
HPI Comments: 75-year-old lady presents with concerns about weakness, dizziness, fatigue, and in general not feeling well. Patient has a mechanical heart valve and she is on warfarin. Because of her valves she says she has a history of anemia and approximately a week ago she required a 1 unit transfusion for a hemoglobin of 6.7. She says she also received in injection of Procrit at that time. Patient says that doesn't seem again and it helped and she continued to be very symptomatic today. Elements of this note were created using speech recognition software. As such, errors of speech recognition may be present. Patient is a 77 y.o. female presenting with fall. The history is provided by the patient and a relative. Fall Pertinent negatives include no fever, no abdominal pain, no nausea, no vomiting, no hematuria and no headaches. Past Medical History:  
Diagnosis Date  Anticoagulated on Coumadin 7/9/2013 S/P AVR  CAD (coronary artery disease) 1/20/2013 5/8/14 PCI LAD with stent placed  Cardiomyopathy ([address]  CHF (congestive heart failure) ([address] 2/17/2017  CKD (chronic kidney disease) stage 3, GFR 30-59 ml/min 7/10/2013  COPD (chronic obstructive pulmonary disease) ([address] 4/2/2014  Essential hypertension, benign 1/20/2013  HLD (hyperlipidemia)  ICD (implantable cardioverter-defibrillator) in place 10/2/2014 Biotronik single-chamber ICD implantation 10/20/14  Iron deficiency anemia due to chronic blood loss 7/29/2009  MDS (myelodysplastic syndrome) ([address] 12/17/2011 Procrit started in August, 2011 12/18/11 Procrit weekly and Iron stores. 5-12 12-13-12 good response to 3 weekly procrit did not need it last time  3-7-13 Pt doing well. Just wanted a \"check-up. \" Responding to Procrit every three weeks. 8-29-13 patient has missed some injections on recently restarted hemoglobin only issue , takes oral iron iron stores each time  REJI (obstructive sleep apnea)-cpap 2014  Osteoarthritis  Osteopenia  Quadrantanopia  Visual complaint 2015 Past Surgical History:  
Procedure Laterality Date [address]    HX AORTIC VALVE REPLACEMENT  ,   
 mechanical valve   HX  SECTION    
 HX CORONARY STENT PLACEMENT  May, 2014 STEMI with Stent placement.  HX ENDOSCOPY  2009 EGD [address] CATHETERIZATION    HX PACEMAKER  10/2/2014 Biotronik ICD  HX TUBAL LIGATION    
 IR BX BONE MARROW DIAGNOSTIC  2011 Family History:  
Problem Relation Age of Onset  Heart Disease Mother CHF  Hypertension Mother  Kidney Disease Mother  Heart Disease Father CHF  Lung Disease Father  Diabetes Father  Cancer Father   
  prostate  Hypertension Father  Heart Attack Father  Heart Disease Maternal Aunt  Diabetes Brother  Coronary Artery Disease Other  Breast Cancer Neg Hx Social History Social History  Marital status:  Spouse name: N/A  
 Number of children: N/A  
 Years of education: N/A Occupational History   Retired  
  [employer] Social History Main Topics  Smoking status: Former Smoker Packs/day: 0.25 Years: 42.00 Types: Cigarettes Start date: 10/1/1956 Quit date: 2014  Smokeless tobacco: Never Used  Alcohol use No  
 Drug use: No  
 Sexual activity: Not on file Other Topics Concern  Weight Concern Yes  Special Diet Yes Social History Narrative Lives with her daughter. Ambulates only short distances. ALLERGIES: Sulfa (sulfonamide antibiotics) and Aspirin Review of Systems Constitutional: Negative for chills, diaphoresis and fever. HENT: Negative for congestion, rhinorrhea and sore throat. Eyes: Negative for redness and visual disturbance. Respiratory: Negative for cough, chest tightness, shortness of breath and wheezing. Cardiovascular: Negative for chest pain and palpitations. Gastrointestinal: Negative for abdominal pain, blood in stool, diarrhea, nausea and vomiting. Endocrine: Negative for polydipsia and polyuria. Genitourinary: Negative for dysuria and hematuria. Musculoskeletal: Negative for arthralgias, myalgias and neck stiffness. Skin: Negative for rash. Allergic/Immunologic: Negative for environmental allergies and food allergies. Neurological: Positive for dizziness and light-headedness. Negative for weakness and headaches. Hematological: Negative for adenopathy. Does not bruise/bleed easily. Psychiatric/Behavioral: Negative for confusion and sleep disturbance. The patient is not nervous/anxious. Vitals:  
 09/07/18 2246 09/07/18 2252 09/07/18 2301 09/08/18 0001 BP: 112/64  114/56 121/57 Pulse: 69  72 68 Resp: 17  18 18 Temp: 98.7 °F (37.1 °C)  98.6 °F (37 °C) 97.7 °F (36.5 °C) SpO2: 96% 94% 96% 95% Weight:      
Height:      
      
 
Physical Exam  
Constitutional: She is oriented to person, place, and time. She appears well-developed and well-nourished. HENT:  
Head: Normocephalic and atraumatic. Eyes: Conjunctivae and EOM are normal. Pupils are equal, round, and reactive to light. Neck: Normal range of motion. Cardiovascular: Normal rate and regular rhythm. Murmur heard. Mechanical valve heard Pulmonary/Chest: Effort normal and breath sounds normal. No respiratory distress. She has no wheezes. She has no rales. She exhibits no tenderness. Abdominal: Soft. Bowel sounds are normal. There is no rebound and no guarding. Musculoskeletal: Normal range of motion. She exhibits no edema or tenderness. Lymphadenopathy:  
  She has no cervical adenopathy. Neurological: She is alert and oriented to person, place, and time. Skin: Skin is warm and dry. Psychiatric: She has a normal mood and affect. Nursing note and vitals reviewed. MDM Number of Diagnoses or Management Options Diagnosis management comments: I will give her 2 units of blood. Her BUN and creatinine are also elevated. ED Course Procedures

## 2018-09-09 LAB
ABO + RH BLD: NORMAL
ANION GAP SERPL CALC-SCNC: 6 MMOL/L (ref 7–16)
BASOPHILS # BLD: 0 K/UL (ref 0–0.2)
BASOPHILS NFR BLD: 0 % (ref 0–2)
BLD PROD TYP BPU: NORMAL
BLD PROD TYP BPU: NORMAL
BLOOD GROUP ANTIBODIES SERPL: NORMAL
BPU ID: NORMAL
BPU ID: NORMAL
BUN SERPL-MCNC: 55 MG/DL (ref 8–23)
CALCIUM SERPL-MCNC: 9.6 MG/DL (ref 8.3–10.4)
CHLORIDE SERPL-SCNC: 98 MMOL/L (ref 98–107)
CO2 SERPL-SCNC: 38 MMOL/L (ref 21–32)
CREAT SERPL-MCNC: 1.56 MG/DL (ref 0.6–1)
CROSSMATCH RESULT,%XM: NORMAL
CROSSMATCH RESULT,%XM: NORMAL
DIFFERENTIAL METHOD BLD: ABNORMAL
EOSINOPHIL # BLD: 0.1 K/UL (ref 0–0.8)
EOSINOPHIL NFR BLD: 2 % (ref 0.5–7.8)
ERYTHROCYTE [DISTWIDTH] IN BLOOD BY AUTOMATED COUNT: 15.9 %
GLUCOSE BLD STRIP.AUTO-MCNC: 108 MG/DL (ref 65–100)
GLUCOSE BLD STRIP.AUTO-MCNC: 190 MG/DL (ref 65–100)
GLUCOSE BLD STRIP.AUTO-MCNC: 244 MG/DL (ref 65–100)
GLUCOSE BLD STRIP.AUTO-MCNC: 97 MG/DL (ref 65–100)
GLUCOSE SERPL-MCNC: 87 MG/DL (ref 65–100)
HCT VFR BLD AUTO: 30.1 % (ref 35.8–46.3)
HGB BLD-MCNC: 9 G/DL (ref 11.7–15.4)
IMM GRANULOCYTES # BLD: 0 K/UL (ref 0–0.5)
IMM GRANULOCYTES NFR BLD AUTO: 0 % (ref 0–5)
INR PPP: 2.8
LYMPHOCYTES # BLD: 1.5 K/UL (ref 0.5–4.6)
LYMPHOCYTES NFR BLD: 21 % (ref 13–44)
MCH RBC QN AUTO: 28.6 PG (ref 26.1–32.9)
MCHC RBC AUTO-ENTMCNC: 29.9 G/DL (ref 31.4–35)
MCV RBC AUTO: 95.6 FL (ref 79.6–97.8)
MONOCYTES # BLD: 0.7 K/UL (ref 0.1–1.3)
MONOCYTES NFR BLD: 10 % (ref 4–12)
NEUTS SEG # BLD: 4.7 K/UL (ref 1.7–8.2)
NEUTS SEG NFR BLD: 66 % (ref 43–78)
NRBC # BLD: 0 K/UL (ref 0–0.2)
PLATELET # BLD AUTO: 193 K/UL (ref 150–450)
PMV BLD AUTO: 10.2 FL (ref 9.4–12.3)
POTASSIUM SERPL-SCNC: 5 MMOL/L (ref 3.5–5.1)
PROTHROMBIN TIME: 28.5 SEC (ref 11.5–14.5)
RBC # BLD AUTO: 3.15 M/UL (ref 4.05–5.2)
SODIUM SERPL-SCNC: 142 MMOL/L (ref 136–145)
SPECIMEN EXP DATE BLD: NORMAL
STATUS OF UNIT,%ST: NORMAL
STATUS OF UNIT,%ST: NORMAL
UNIT DIVISION, %UDIV: 0
UNIT DIVISION, %UDIV: 0
WBC # BLD AUTO: 7 K/UL (ref 4.3–11.1)

## 2018-09-09 PROCEDURE — 74011636637 HC RX REV CODE- 636/637: Performed by: INTERNAL MEDICINE

## 2018-09-09 PROCEDURE — 97162 PT EVAL MOD COMPLEX 30 MIN: CPT

## 2018-09-09 PROCEDURE — 86580 TB INTRADERMAL TEST: CPT | Performed by: INTERNAL MEDICINE

## 2018-09-09 PROCEDURE — 74011250637 HC RX REV CODE- 250/637: Performed by: INTERNAL MEDICINE

## 2018-09-09 PROCEDURE — 94760 N-INVAS EAR/PLS OXIMETRY 1: CPT

## 2018-09-09 PROCEDURE — 77010033678 HC OXYGEN DAILY

## 2018-09-09 PROCEDURE — 85025 COMPLETE CBC W/AUTO DIFF WBC: CPT

## 2018-09-09 PROCEDURE — 36415 COLL VENOUS BLD VENIPUNCTURE: CPT

## 2018-09-09 PROCEDURE — 97110 THERAPEUTIC EXERCISES: CPT

## 2018-09-09 PROCEDURE — 74011250636 HC RX REV CODE- 250/636: Performed by: FAMILY MEDICINE

## 2018-09-09 PROCEDURE — 82962 GLUCOSE BLOOD TEST: CPT

## 2018-09-09 PROCEDURE — 80048 BASIC METABOLIC PNL TOTAL CA: CPT

## 2018-09-09 PROCEDURE — 94640 AIRWAY INHALATION TREATMENT: CPT

## 2018-09-09 PROCEDURE — 94660 CPAP INITIATION&MGMT: CPT

## 2018-09-09 PROCEDURE — 74011000302 HC RX REV CODE- 302: Performed by: INTERNAL MEDICINE

## 2018-09-09 PROCEDURE — 85610 PROTHROMBIN TIME: CPT

## 2018-09-09 PROCEDURE — 77030020263 HC SOL INJ SOD CL0.9% LFCR 1000ML

## 2018-09-09 PROCEDURE — 74011000250 HC RX REV CODE- 250: Performed by: INTERNAL MEDICINE

## 2018-09-09 PROCEDURE — 65270000029 HC RM PRIVATE

## 2018-09-09 RX ORDER — WARFARIN 2 MG/1
2 TABLET ORAL EVERY EVENING
Status: DISCONTINUED | OUTPATIENT
Start: 2018-09-10 | End: 2018-09-10

## 2018-09-09 RX ORDER — WARFARIN 2 MG/1
2 TABLET ORAL EVERY EVENING
Status: DISCONTINUED | OUTPATIENT
Start: 2018-09-09 | End: 2018-09-09

## 2018-09-09 RX ORDER — TRAMADOL HYDROCHLORIDE 50 MG/1
50 TABLET ORAL
Status: DISCONTINUED | OUTPATIENT
Start: 2018-09-09 | End: 2018-09-10

## 2018-09-09 RX ADMIN — TUBERCULIN PURIFIED PROTEIN DERIVATIVE 5 UNITS: 5 INJECTION, SOLUTION INTRADERMAL at 09:27

## 2018-09-09 RX ADMIN — ALBUTEROL SULFATE 2.5 MG: 2.5 SOLUTION RESPIRATORY (INHALATION) at 21:05

## 2018-09-09 RX ADMIN — CARVEDILOL 6.25 MG: 6.25 TABLET, FILM COATED ORAL at 17:02

## 2018-09-09 RX ADMIN — BUDESONIDE 500 MCG: 0.5 INHALANT RESPIRATORY (INHALATION) at 07:40

## 2018-09-09 RX ADMIN — Medication 10 ML: at 13:13

## 2018-09-09 RX ADMIN — SIMVASTATIN 20 MG: 20 TABLET, FILM COATED ORAL at 21:00

## 2018-09-09 RX ADMIN — FUROSEMIDE 80 MG: 20 TABLET ORAL at 08:04

## 2018-09-09 RX ADMIN — FUROSEMIDE 80 MG: 20 TABLET ORAL at 17:02

## 2018-09-09 RX ADMIN — TIOTROPIUM BROMIDE 18 MCG: 18 CAPSULE ORAL; RESPIRATORY (INHALATION) at 07:40

## 2018-09-09 RX ADMIN — SODIUM CHLORIDE 50 ML/HR: 900 INJECTION, SOLUTION INTRAVENOUS at 04:56

## 2018-09-09 RX ADMIN — INSULIN LISPRO 4 UNITS: 100 INJECTION, SOLUTION INTRAVENOUS; SUBCUTANEOUS at 12:11

## 2018-09-09 RX ADMIN — TRAMADOL HYDROCHLORIDE 50 MG: 50 TABLET, FILM COATED ORAL at 10:17

## 2018-09-09 RX ADMIN — ESCITALOPRAM OXALATE 20 MG: 10 TABLET ORAL at 08:03

## 2018-09-09 RX ADMIN — CARVEDILOL 6.25 MG: 6.25 TABLET, FILM COATED ORAL at 08:04

## 2018-09-09 RX ADMIN — OXYCODONE HYDROCHLORIDE AND ACETAMINOPHEN 500 MG: 500 TABLET ORAL at 08:03

## 2018-09-09 RX ADMIN — Medication 10 ML: at 21:01

## 2018-09-09 RX ADMIN — ALBUTEROL SULFATE 2.5 MG: 2.5 SOLUTION RESPIRATORY (INHALATION) at 07:40

## 2018-09-09 RX ADMIN — BUDESONIDE 500 MCG: 0.5 INHALANT RESPIRATORY (INHALATION) at 21:04

## 2018-09-09 RX ADMIN — ALBUTEROL SULFATE 2.5 MG: 2.5 SOLUTION RESPIRATORY (INHALATION) at 13:33

## 2018-09-09 RX ADMIN — ISOSORBIDE MONONITRATE 30 MG: 30 TABLET, EXTENDED RELEASE ORAL at 08:03

## 2018-09-09 RX ADMIN — Medication 10 ML: at 07:13

## 2018-09-09 RX ADMIN — INSULIN LISPRO 2 UNITS: 100 INJECTION, SOLUTION INTRAVENOUS; SUBCUTANEOUS at 21:00

## 2018-09-09 NOTE — PROGRESS NOTES
Problem: Mobility Impaired (Adult and Pediatric) Goal: *Acute Goals and Plan of Care (Insert Text) 1. Ms. Charan Billy will perform supine to sit and sit to supine independently in 7 days. 2.  Ms. Charan Billy will perform sit to stand and bed to chair independently in 7 days. 3.  Ms. Charan Billy will perform gait with least restrictive device 200 ft independently in 7 days. 4.  Ms. Charan Billy will perform therex x 25 reps in sitting and standing in 7 days. PHYSICAL THERAPY: Initial Assessment, Treatment Day: Day of Assessment 9/9/2018 INPATIENT: Hospital Day: 3 [place] / Product Type: Medicare /  
  
NAME/AGE/GENDER: Zenia Denver is a 77 y.o. female PRIMARY DIAGNOSIS: Anemia Anemia Anemia ICD-10: Treatment Diagnosis:  
 · Generalized Muscle Weakness (M62.81) · Difficulty in walking, Not elsewhere classified (R26.2) · History of falling (Z91.81) Precaution/Allergies: 
Sulfa (sulfonamide antibiotics) and Aspirin ASSESSMENT:  
 
Ms. Charan Billy presents with decreased mobility and decreased gait. She tells me about her fall when she slipped on wet floor stepping out of the shower when her aide was there about a week ago. She has a large hematoma along with a fluid filled blister on her knee. She admits it is quite painful. Feels tight. This however did not dampen her sense of humor. She tells me how she lives by herself and wants to keep doing it even if it means going back to 9th floor. She tells me she has been to the 9th floor before and it is the only option for her. Currently she requires min assist for transfer and gait with rolling walker 15 ft but painful. At baseline she doesn't use any assistive device. Per Ms. Charan Billy a physician is going to come today and aspirate blister.  Ms. Charan Billy is appropriate for skilled PT to maximize her rehab potential.  Would benefit from a post acute rehab stay prior to returning home as it is so important to her to stay living by herself. She does have an aide that comes 4 times a week. This section established at most recent assessment PROBLEM LIST (Impairments causing functional limitations): 1. Decreased Strength 2. Decreased Transfer Abilities 3. Decreased Ambulation Ability/Technique 4. Increased Pain 5. Decreased Activity Tolerance INTERVENTIONS PLANNED: (Benefits and precautions of physical therapy have been discussed with the patient.) 1. Bed Mobility 2. Gait Training 3. Therapeutic Activites 4. Therapeutic Exercise/Strengthening 5. Transfer Training TREATMENT PLAN: Frequency/Duration: 4 times a week for duration of hospital stay Rehabilitation Potential For Stated Goals: Good RECOMMENDED REHABILITATION/EQUIPMENT: (at time of discharge pending progress): Due to the probability of continued deficits (see above) this patient will likely need continued skilled physical therapy after discharge. Equipment:  
? None at this time HISTORY:  
History of Present Injury/Illness (Reason for Referral): 
  
Ms. Siobhan Hodges is a 76 yo female with PMH of probable MDS followed by hematology Dr. Payal Blount, CKD 3, s/dCHF with ICD - EF 20-25%, chronic hypoxic respiratory failure on 3 L home O2, nocturnal trilogy use for REJI, DM2, HTN, and mechanical AVR on coumadin evaluated with acute on chronic anemia. She typically has monthly procrit injections and intermittent PRBC transfusions for worsening anemia. Had Bone marrow biopsy that was nondiagnostic however unable to rule out lower grade MDS. She denies oly GI blood loss and I am not able to locate GI scope reports. Current HGB 6.5 and 2 units PRBC ordered per ED. She did sustain a fall in the bathroom several weeks ago with subsequent bruising and blisters to LE Past Medical History/Comorbidities: Ms. Vaughn Hilliard  has a past medical history of Anticoagulated on Coumadin (2013); CAD (coronary artery disease) (2013); Cardiomyopathy [facility]); CHF (congestive heart failure) ([facility] (2017); CKD (chronic kidney disease) stage 3, GFR 30-59 ml/min (7/10/2013); COPD (chronic obstructive pulmonary disease) ([facility] (2014); Essential hypertension, benign (2013); HLD (hyperlipidemia); ICD (implantable cardioverter-defibrillator) in place (10/2/2014); Iron deficiency anemia due to chronic blood loss (2009); MDS (myelodysplastic syndrome) ([facility] (2011); REJI (obstructive sleep apnea)-cpap (2014); Osteoarthritis; Osteopenia; Quadrantanopia; and Visual complaint (2015). She also has no past medical history of Contact dermatitis and other eczema, due to unspecified cause; Difficult intubation; Malignant hyperthermia due to anesthesia; Nausea & vomiting; Pseudocholinesterase deficiency; or Unspecified adverse effect of anesthesia. Ms. Vaughn Hilliard  has a past surgical history that includes cardiac catheterization (); ir bx bone marrow diagnostic (2011); hx aortic valve replacement (, ); hx  section; hx tubal ligation; hx heart catheterization (); hx coronary stent placement (May, 2014); hx pacemaker (10/2/2014); and hx endoscopy (2009). Social History/Living Environment:  
Home Environment: Apartment # Steps to Enter: 3 One/Two Story Residence: One story Living Alone: Yes Support Systems: Family member(s) Patient Expects to be Discharged to[de-identified] Rehabilitation facility Current DME Used/Available at Home: Oxygen, portable, Shower chair, Raised toilet seat, Walker, rolling Prior Level of Function/Work/Activity: 
Independent with aide 4 days a week, home o2 
age, repeated falls Number of Personal Factors/Comorbidities that affect the Plan of Care: 1-2: MODERATE COMPLEXITY EXAMINATION:  
Most Recent Physical Functioning:  
Gross Assessment: AROM: Generally decreased, functional 
Strength: Generally decreased, functional 
         
  
Posture: 
Posture (WDL): Within defined limits Balance: 
Sitting: Intact Standing: Impaired; With support Standing - Static: Fair Standing - Dynamic : Fair Bed Mobility: 
  
Wheelchair Mobility: 
  
Transfers: 
Sit to Stand: Minimum assistance Stand to Sit: Minimum assistance Gait: 
  
Base of Support: Widened Speed/Winifred: Slow Step Length: Left shortened;Right shortened Distance (ft): 15 Feet (ft) Assistive Device: Walker, rolling Ambulation - Level of Assistance: Contact guard assistance Interventions: Tactile cues; Verbal cues; Safety awareness training;Manual cues Body Structures Involved: 1. Muscles Body Functions Affected: 1. Movement Related Activities and Participation Affected: 1. Mobility Number of elements that affect the Plan of Care: 3: MODERATE COMPLEXITY CLINICAL PRESENTATION:  
Presentation: Evolving clinical presentation with changing clinical characteristics: MODERATE COMPLEXITY CLINICAL DECISION MAKIN[address] [place], [place] [id number] AM-PAC 6 Clicks Basic Mobility Inpatient Short Form How much difficulty does the patient currently have. .. Unable A Lot A Little None 1. Turning over in bed (including adjusting bedclothes, sheets and blankets)? [] 1   [] 2   [x] 3   [] 4  
2. Sitting down on and standing up from a chair with arms ( e.g., wheelchair, bedside commode, etc.)   [] 1   [] 2   [x] 3   [] 4  
3. Moving from lying on back to sitting on the side of the bed? [] 1   [] 2   [x] 3   [] 4 How much help from another person does the patient currently need. .. Total A Lot A Little None 4. Moving to and from a bed to a chair (including a wheelchair)? [] 1   [] 2   [x] 3   [] 4  
5. Need to walk in hospital room? [] 1   [] 2   [x] 3   [] 4  
6. Climbing 3-5 steps with a railing?    [] 1   [x] 2   [] 3   [] 4  
 © 2007, Trustees of [address] [place] [address] [address] [address], under license to [employer]. All rights reserved Score:  Initial: 17 Most Recent: X (Date: -- ) Interpretation of Tool:  Represents activities that are increasingly more difficult (i.e. Bed mobility, Transfers, Gait). Score 24 23 22-20 19-15 14-10 9-7 6 Modifier CH CI CJ CK CL CM CN   
 
? Mobility - Walking and Moving Around:  
  - CURRENT STATUS: CK - 40%-59% impaired, limited or restricted  - GOAL STATUS: CK - 40%-59% impaired, limited or restricted  - D/C STATUS:  ---------------To be determined--------------- [place] / [place] / Product Type: Medicare /   
 
Medical Necessity:    
· Patient is expected to demonstrate progress in functional technique to increase independence with mobility and gait. . 
Reason for Services/Other Comments: 
· Patient continues to require present interventions due to patient's inability to function at baseline. .  
Use of outcome tool(s) and clinical judgement create a POC that gives a: Questionable prediction of patient's progress: MODERATE COMPLEXITY  
  
 
 
 
TREATMENT:  
(In addition to Assessment/Re-Assessment sessions the following treatments were rendered) Pre-treatment Symptoms/Complaints:  none Pain: Initial:  
Pain Intensity 1: 8 Pain Location 1: Knee Pain Orientation 1: Left Pain Intervention(s) 1: Emotional support  Post Session:  None,  Yes she was hurting but she did want to admit it. Therapeutic Exercise: (  8):  Exercises per grid below to improve strength. Required minimal verbal cues to promote proper body mechanics. Progressed repetitions as indicated. Date: 
9/9 Date: 
 Date: Activity/Exercise Parameters Parameters Parameters AP 20 Knee extension 10    
marching 20 Adductor squeezes 20 Braces/Orthotics/Lines/Etc:  
· O2 Device: Nasal cannula Treatment/Session Assessment: · Response to Treatment:  good · Interdisciplinary Collaboration:  
o Registered Nurse · After treatment position/precautions:  
o Up in chair 
o Call light within reach · Compliance with Program/Exercises: Will assess as treatment progresses. · Recommendations/Intent for next treatment session: \"Next visit will focus on advancements to more challenging activities and reduction in assistance provided\". Total Treatment Duration: PT Patient Time In/Time Out Time In: 1115 Time Out: 1143 Nicolette Mares, PT

## 2018-09-09 NOTE — PROGRESS NOTES
Warfarin dosing per pharmacist 
 
Bubba Raza Eduar Allen is a 77 y.o. female. Height: 5' 2\" (157.5 cm)    Weight: 102.8 kg (226 lb 9.6 oz) Indication:  S/p AVR Goal INR:  2-3 Home dose:  5 mg Sun and 2.5 mg ROW (Weekly dose of 20 mg) Risk factors or significant drug interactions:  none Other anticoagulants:  none Daily Monitoring Date  INR     Warfarin dose HGB              Notes 9/8  3.4  Hold  9.3 
9/9  2.8  Hold  9.0  
9/10    2 mg Pharmacy has been consulted to dose warfarin for this patient due to a history of AVR (mechanical). The INR goal per the patient's home warfarin clinic is 2-3. Patient initially presents with a supratherapeutic INR though it is trending down. INR now within therapeutic range. Patient is to have a procedure performed today so warfarin will be held tonight. Will plan to resume warfarin at 2 mg daily on 9/10. Daily INR Will continue following Thank you, Jose Manuel Norwood, PharmD, BCPS Clinical Pharmacist 
[phone]

## 2018-09-09 NOTE — PROGRESS NOTES
END OF SHIFT NOTE: 
 
Intake/Output Voiding: YES Catheter: NO 
Drain:  
External Female Catheter 09/09/18 (Active) Site Assessment Clean, dry, & intact 9/9/2018  2:20 AM  
Repositioned No 9/9/2018  2:20 AM  
Perineal Care No 9/9/2018  2:20 AM  
Wick Changed No 9/9/2018  2:20 AM  
Suction Canister/Tubing Changed No 9/9/2018  2:20 AM  
 
 
 
 
 
 
Stool:  0 occurrences. Emesis:  0 occurrences. VITAL SIGNS Patient Vitals for the past 12 hrs: 
 Temp Pulse Resp BP SpO2  
09/09/18 0351 97.9 °F (36.6 °C) 69 15 139/69 97 % 09/08/18 2345 97.9 °F (36.6 °C) 68 18 135/64 95 % 09/08/18 2230 - - - - 95 % 09/08/18 1946 98.5 °F (36.9 °C) 68 17 109/57 93 % Pain Assessment Pain 1 Pain Scale 1: Numeric (0 - 10) (09/09/18 0220) Pain Intensity 1: 0 (09/09/18 0220) Patient Stated Pain Goal: 0 (09/09/18 0220) Pain Reassessment 1: Yes (09/08/18 0919) Pain Location 1: Knee (09/08/18 0830) Pain Orientation 1: Left (09/08/18 0830) Pain Description 1: Aching (09/08/18 0830) Pain Intervention(s) 1: Medication (see MAR) (09/08/18 0830) Ambulating Yes Additional Information: Patient rested well. Uneventful night. Shift report given to oncoming nurse at the bedside. Terry Akbar

## 2018-09-09 NOTE — PROGRESS NOTES
6160-Bedside report received from Tanvir Mustafa RN. Resting in bed. No needs voiced. No s/s of acute distress. 1815-END OF SHIFT NOTE: 
Pt's VSS and is in no acute distress. Pt to go to SNF at discharge for PT rehab. Pt had PPD placed today. Pt to have hematoma drained by Dr. Ryan Wallace or tomorrow. Intake/Output 09/09 0701 - 09/09 1900 In: 1026 [P.O.:560; I.V.:466] Out: 2700 [Urine:2700] Voiding: YES Catheter: NO 
Drain:  
External Female Catheter 09/09/18 (Active) Site Assessment Clean, dry, & intact 9/9/2018  2:20 AM  
Repositioned No 9/9/2018  2:20 AM  
Perineal Care No 9/9/2018  2:20 AM  
Wick Changed No 9/9/2018  2:20 AM  
Suction Canister/Tubing Changed No 9/9/2018  2:20 AM  
Urine Output (mL) 1000 ml 9/9/2018  3:15 PM  
 
 
Stool:  0 occurrences. Emesis:  0 occurrences. VITAL SIGNS Patient Vitals for the past 12 hrs: 
 Temp Pulse Resp BP SpO2  
09/09/18 1514 98.9 °F (37.2 °C) 70 18 137/68 94 % 09/09/18 1333 - - - - 96 % 09/09/18 1102 98.2 °F (36.8 °C) 68 18 120/65 96 % 09/09/18 0758 97.1 °F (36.2 °C) 71 18 143/68 100 % 09/09/18 0740 - - - - 93 % Pain Assessment Pain 1 Pain Scale 1: Numeric (0 - 10) (09/09/18 1238) Pain Intensity 1: 1 (09/09/18 1238) Patient Stated Pain Goal: 0 (09/09/18 0220) Pain Reassessment 1: Yes (09/09/18 1238) Pain Location 1: Knee (09/09/18 1143) Pain Orientation 1: Left (09/09/18 1143) Pain Description 1: Aching (09/09/18 1143) Pain Intervention(s) 1: Emotional support (09/09/18 1143) Ambulating Yes Gabriela Huber 1855-Bedside shift change report given to Tanvir Mustafa RN (oncoming nurse) by Shawn Evans RN (offgoing nurse). Report included the following information SBAR, Kardex, Intake/Output, MAR and Recent Results.

## 2018-09-09 NOTE — PROGRESS NOTES
Hospitalist Progress Note Admit Date:  2018  5:55 PM  
Name:  Duglas Chambers Age:  77 y.o. 
:  1950 MRN:  [id number] PCP:  Laly Sanders MD 
Treatment Team: Attending Provider: Mare Mack MD; Utilization Review: Jacky Rico; Consulting Provider: Lula Palma NP; Surgeon: Edie Cary MD 
 
Subjective: The patient is a 78 y/o lady with possible MDS, HTN, DM 2, CAD s/p stent, Chronic systolic CHF s/p AICD, REJI on Trilogy at night, Mechanical Aortic valve on Coumadin, Chronic Hypoxemic Respiratory Failure on home O2, was admitted for Acute on Chronic Anemia, FELIZ on CKD stage 3 and Supratherapeutic INR. She feels less tired today. She c/o pain on the left knee where a big hematoma is visible. Hb improved to 9.3 after 2 PRBCs. Objective:  
Patient Vitals for the past 24 hrs: 
 Temp Pulse Resp BP SpO2  
18 1946 98.5 °F (36.9 °C) 68 17 109/57 93 % 18 1922 - - - - 95 % 18 1515 96.6 °F (35.9 °C) 69 18 130/63 94 % 18 1325 - - - - 97 % 18 1111 98.2 °F (36.8 °C) 69 18 120/53 96 % 18 0711 98 °F (36.7 °C) 73 18 144/79 98 % 18 0520 99.2 °F (37.3 °C) 70 16 132/61 99 % 18 0430 97.8 °F (36.6 °C) 69 17 137/68 99 % 18 0338 - - - - 99 % 18 0330 97.7 °F (36.5 °C) 70 16 129/62 99 % 18 0231 99 °F (37.2 °C) 66 15 122/62 98 % 18 0200 98.8 °F (37.1 °C) 66 14 125/56 98 % 18 0100 98.1 °F (36.7 °C) 65 16 117/59 98 % 18 0001 97.7 °F (36.5 °C) 68 18 121/57 95 % 18 2301 98.6 °F (37 °C) 72 18 114/56 96 % 18 2300 - - - - 93 % 18 2252 - - - - 94 % 18 2246 98.7 °F (37.1 °C) 69 17 112/64 96 % 18 2233 - - - - 93 % 18 2216 98.2 °F (36.8 °C) 73 18 159/66 94 % Oxygen Therapy O2 Sat (%): 93 % (18) Pulse via Oximetry: 65 beats per minute (18) O2 Device: Nasal cannula (18) O2 Flow Rate (L/min): 3 l/min (09/08/18 1922) FIO2 (%): 32 % (09/08/18 0338) Intake/Output Summary (Last 24 hours) at 09/08/18 2046 Last data filed at 09/08/18 1720 Gross per 24 hour Intake              906 ml Output             1250 ml Net             -344 ml General:    Well nourished. Alert. CV:   RRR. No murmur, rub, or gallop. Lungs:   Clear to auscultation bilaterally. No wheezing, rhonchi, or rales. Abdomen:   Soft, nontender, nondistended. Extremities: Warm and dry. No cyanosis or edema. Hematoma visible on the left knee, tender on palpation Skin:     No rashes or jaundice, except for ecchymoses and hematoma on the LLE. Data Review: 
I have reviewed all labs, meds, telemetry events, and studies from the last 24 hours. Recent Results (from the past 24 hour(s)) INFLUENZA A & B AG (RAPID TEST) Collection Time: 09/07/18  9:39 PM  
Result Value Ref Range Influenza A Ag NEGATIVE  NEG Influenza B Ag NEGATIVE  NEG Source NASOPHARYNGEAL    
GLUCOSE, POC Collection Time: 09/08/18  7:55 AM  
Result Value Ref Range Glucose (POC) 95 65 - 100 mg/dL METABOLIC PANEL, BASIC Collection Time: 09/08/18  8:17 AM  
Result Value Ref Range Sodium 140 136 - 145 mmol/L Potassium 4.7 3.5 - 5.1 mmol/L Chloride 96 (L) 98 - 107 mmol/L  
 CO2 38 (H) 21 - 32 mmol/L Anion gap 6 (L) 7 - 16 mmol/L Glucose 93 65 - 100 mg/dL BUN 62 (H) 8 - 23 MG/DL Creatinine 1.75 (H) 0.6 - 1.0 MG/DL  
 GFR est AA 37 (L) >60 ml/min/1.73m2 GFR est non-AA 31 (L) >60 ml/min/1.73m2 Calcium 9.4 8.3 - 10.4 MG/DL  
CBC WITH AUTOMATED DIFF Collection Time: 09/08/18  8:17 AM  
Result Value Ref Range WBC 7.2 4.3 - 11.1 K/uL  
 RBC 3.13 (L) 4.05 - 5.2 M/uL HGB 9.3 (L) 11.7 - 15.4 g/dL HCT 29.5 (L) 35.8 - 46.3 % MCV 94.2 79.6 - 97.8 FL  
 MCH 29.7 26.1 - 32.9 PG  
 MCHC 31.5 31.4 - 35.0 g/dL  
 RDW 15.5 % PLATELET 783 809 - 555 K/uL  MPV 10.3 9.4 - 12.3 FL  
 ABSOLUTE NRBC 0.00 0.0 - 0.2 K/uL  
 DF AUTOMATED NEUTROPHILS 69 43 - 78 % LYMPHOCYTES 19 13 - 44 % MONOCYTES 10 4.0 - 12.0 % EOSINOPHILS 2 0.5 - 7.8 % BASOPHILS 0 0.0 - 2.0 % IMMATURE GRANULOCYTES 0 0.0 - 5.0 %  
 ABS. NEUTROPHILS 5.0 1.7 - 8.2 K/UL  
 ABS. LYMPHOCYTES 1.4 0.5 - 4.6 K/UL  
 ABS. MONOCYTES 0.7 0.1 - 1.3 K/UL  
 ABS. EOSINOPHILS 0.2 0.0 - 0.8 K/UL  
 ABS. BASOPHILS 0.0 0.0 - 0.2 K/UL  
 ABS. IMM. GRANS. 0.0 0.0 - 0.5 K/UL PROTHROMBIN TIME + INR Collection Time: 09/08/18  8:17 AM  
Result Value Ref Range Prothrombin time 32.6 (H) 11.5 - 14.5 sec INR 3.4 GLUCOSE, POC Collection Time: 09/08/18 11:08 AM  
Result Value Ref Range Glucose (POC) 184 (H) 65 - 100 mg/dL GLUCOSE, POC Collection Time: 09/08/18  4:34 PM  
Result Value Ref Range Glucose (POC) 113 (H) 65 - 100 mg/dL GLUCOSE, POC Collection Time: 09/08/18  8:05 PM  
Result Value Ref Range Glucose (POC) 158 (H) 65 - 100 mg/dL All Micro Results Procedure Component Value Units Date/Time INFLUENZA A & B AG (RAPID TEST) [[id number]] Collected:  09/07/18 2139 Order Status:  Completed Specimen:  Nasopharyngeal from Nasal washing Updated:  09/07/18 2205 Influenza A Ag NEGATIVE      
   NEGATIVE FOR THE PRESENCE OF INFLUENZA A ANTIGEN 
INFECTION DUE TO INFLUENZA A CANNOT BE RULED OUT. BECAUSE THE ANTIGEN PRESENT IN THE SAMPLE MAY BE BELOW 
THE DETECTION LIMIT OF THE TEST. A NEGATIVE TEST IS PRESUMPTIVE AND IT IS RECOMMENDED THAT THESE RESULTS BE CONFIRMED BY VIRAL CULTURE OR AN FDA-CLEARED INFLUENZA A AND B MOLECULAR ASSAY. Influenza B Ag NEGATIVE      
   NEGATIVE FOR THE PRESENCE OF INFLUENZA B ANTIGEN 
INFECTION DUE TO INFLUENZA B CANNOT BE RULED OUT. BECAUSE THE ANTIGEN PRESENT IN THE SAMPLE MAY BE BELOW 
THE DETECTION LIMIT OF THE TEST. A NEGATIVE TEST IS PRESUMPTIVE AND IT IS RECOMMENDED THAT THESE RESULTS BE CONFIRMED BY VIRAL CULTURE OR AN FDA-CLEARED INFLUENZA A AND B MOLECULAR ASSAY. Source NASOPHARYNGEAL Current Meds: 
Current Facility-Administered Medications Medication Dose Route Frequency  morphine injection 2 mg  2 mg IntraVENous Q4H PRN  
 0.9% sodium chloride infusion 250 mL  250 mL IntraVENous PRN  
 0.9% sodium chloride infusion  50 mL/hr IntraVENous CONTINUOUS  
 albuterol (PROVENTIL VENTOLIN) nebulizer solution 2.5 mg  2.5 mg Nebulization Q4H PRN  
 ascorbic acid (vitamin C) (VITAMIN C) tablet 500 mg  500 mg Oral DAILY  carvedilol (COREG) tablet 6.25 mg  6.25 mg Oral BID WITH MEALS  escitalopram oxalate (LEXAPRO) tablet 20 mg  20 mg Oral DAILY  furosemide (LASIX) tablet 80 mg  80 mg Oral BID  isosorbide mononitrate ER (IMDUR) tablet 30 mg  30 mg Oral DAILY  simvastatin (ZOCOR) tablet 20 mg  20 mg Oral QHS  tiotropium (SPIRIVA) inhalation capsule 18 mcg  18 mcg Inhalation DAILY  sodium chloride (NS) flush 5-10 mL  5-10 mL IntraVENous Q8H  
 sodium chloride (NS) flush 5-10 mL  5-10 mL IntraVENous PRN  
 acetaminophen (TYLENOL) tablet 650 mg  650 mg Oral Q6H PRN  
 ondansetron (ZOFRAN) injection 4 mg  4 mg IntraVENous Q4H PRN  
 insulin lispro (HUMALOG) injection   SubCUTAneous AC&HS  influenza vaccine 2018-19 (6 mos+)(PF) (FLUARIX QUAD/FLULAVAL QUAD) injection 0.5 mL  0.5 mL IntraMUSCular PRIOR TO DISCHARGE  budesonide (PULMICORT) 500 mcg/2 ml nebulizer suspension  500 mcg Nebulization BID RT And  
 albuterol (PROVENTIL VENTOLIN) nebulizer solution 2.5 mg  2.5 mg Nebulization Q6HWA RT  
 WARFARIN INFORMATION NOTE (COUMADIN)   Other Rx Dosing/Monitoring Other Studies (last 24 hours): No results found. Assessment and Plan:  
 
Hospital Problems as of 9/8/2018  Date Reviewed: 8/31/2018 Codes Class Noted - Resolved POA Coagulopathy ([facility] [address] INR >4 on admission 9/7/18 ICD-10-CM: D68.9 ICD-9-CM: 286.9  9/8/2018 - Present Yes Traumatic hematoma of left knee ICD-10-CM: W37.99TT ICD-9-CM: 924.11  9/8/2018 - Present Yes Debility ICD-10-CM: R53.81 ICD-9-CM: 799.3  9/8/2018 - Present Yes * (Principal)Anemia ICD-10-CM: D64.9 ICD-9-CM: 285.9  9/7/2018 - Present Yes Chronic respiratory failure with hypoxia (HCC) (Chronic) ICD-10-CM: J96.11 
ICD-9-CM: 518.83, 799.02  9/7/2018 - Present Yes Acute kidney injury superimposed on chronic kidney disease [facility] ICD-10-CM: N17.9, N18.9 ICD-9-CM: 866.00, 585.9  9/7/2018 - Present Yes Obesity, morbid [facility] ICD-10-CM: E66.01 
ICD-9-CM: 278.01  6/8/2018 - Present Yes S/P AVR (aortic valve replacement) (Chronic) ICD-10-CM: Z95.2 ICD-9-CM: V43.3  Unknown - Present Yes Overview Signed 2/23/2016 11:04 AM by [facility] [employer] 
  
  
   
 ICD (implantable cardioverter-defibrillator) in place ICD-10-CM: Z95.810 ICD-9-CM: V45.02  10/2/2014 - Present Yes Overview Signed 10/2/2014  4:58 PM by Mahala Sis III [employer] single-chamber ICD implantation 10/20/14 Diabetes mellitus type 2, diet-controlled (HCC) (Chronic) ICD-10-CM: E11.9 ICD-9-CM: 250.00  8/28/2014 - Present Yes COPD (chronic obstructive pulmonary disease) (HCC) (Chronic) ICD-10-CM: J44.9 ICD-9-CM: 210  4/2/2014 - Present Yes  
   
 REJI (obstructive sleep apnea)-cpap (Chronic) ICD-10-CM: M92.08 
ICD-9-CM: 327.23  4/2/2014 - Present Yes  
   
 CKD (chronic kidney disease) stage 3, GFR 30-59 ml/min (Chronic) ICD-10-CM: N18.3 ICD-9-CM: 585.3  7/10/2013 - Present Yes Anticoagulated on Coumadin (Chronic) ICD-10-CM: Z51.81, Z79.01 
ICD-9-CM: V58.83, V58.61  7/9/2013 - Present Yes Overview Signed 7/9/2013  4:16 PM by Daisha Moon NP  
  S/P AVR 
  
  
   
 CAD (coronary artery disease) (Chronic) ICD-10-CM: I25.10 ICD-9-CM: 414.00  1/20/2013 - Present Yes  Overview Signed 5/20/2014 10:05 AM by Luly Wyatt NP  
 5/8/14 PCI LAD with stent placed Chronic combined systolic and diastolic heart failure (HCC) (Chronic) ICD-10-CM: I50.42 
ICD-9-CM: 428.42  1/20/2013 - Present Yes Overview Addendum 4/28/2018  4:53 AM by Garland Tamayo MD  
  5/8/14 ECHO:  EF 10-15% 12/2017:  EF 25-30% Essential hypertension, benign (Chronic) ICD-10-CM: I10 
ICD-9-CM: 401.1  1/20/2013 - Present Yes  
   
 MDS (myelodysplastic syndrome) (HCC) (Chronic) ICD-10-CM: D46.9 ICD-9-CM: 238.75  12/17/2011 - Present Yes Overview Addendum 8/29/2013 11:06 AM by Elayne Eli Procrit started in August, 2011 12/18/11 Procrit weekly and Iron stores. 5-12 12-13-12 good response to 3 weekly procrit did not need it last time 3-7-13 Pt doing well. Just wanted a \"check-up. \" Responding to Procrit every three weeks. 8-29-13 patient has missed some injections on recently restarted hemoglobin only issue , takes oral iron iron stores each time 1 - Acute on Chronic Anemia 2 - FELIZ on CKD stage 3, improved. Cr down from 2.19 to 1.75 (baseline 1.6-1.8) 3 - Supratherapeutic INR, last INR 3.4 
4 - Left Knee Hematoma 5 - Possible MDS 
6 - HTN 
7 - DM 2 
8 - CAD s/p stent 9 - Chronic Systolic CHF s/p AICD 
10 - Chronic hypoxemic respiratory failure on home O2/ REJI on Trilogy 11 - Mechanical Aortic Valve PLAN:   
· Hb improved from 6.5 to 9.3 after 2 PRBCs · Continue to monitor Hb · Continue IV fluids · Continue to hold Coumadin. Pharmacy assistance for dosing. · Surgery consult for left knee hematoma · Continue home medications for chronic conditions DC planning/Dispo: Home in 2-3 days DVT ppx: the patient is on Coumadin Signed: Juan Christianson MD

## 2018-09-09 NOTE — PROGRESS NOTES
H&P/Consult Note/Progress Note/Office Note:  
Roberto Simmons  MRN: [id number]  ZIR:[birth year]  Age:68 y.o. General Surgery Consult ordered by: Dr. Michelle Beckford Reason for Consult:Eval left knee hematoma HPI: Roberto Simmons is a 77 y.o. female with PMH of probable MDS followed by hematology Dr. Milton Macedo, CKD 3, s/dCHF with ICD - EF 20-25%, chronic hypoxic respiratory failure on 3 L home O2, nocturnal trilogy use for REJI, DM2, HTN, and mechanical AVR on coumadin admitted by Hospitalist with acute on chronic anemia. She typically has monthly procrit injections and intermittent PRBC transfusions for worsening anemia. Had Bone marrow biopsy that was nondiagnostic however unable to rule out lower grade MDS. At the time of admission HGB 6.5 and 2 units PRBC given. Pt reports falling in the bathroom last Thursday with subsequent bruising and blisters x 2 to Left lower extremity. 9/7/18 Xray Left Knee Hx: Pain after falling Three views of the left knee were obtained 
  
FINDINGS:  There is pretibial soft tissue edema and swelling. There is no 
evidence of fracture or other acute bony abnormality. No bony lesions are seen. There is no joint effusion. 
  
IMPRESSION: No evidence of fracture 9/7/18: Head CT Hx:  Reason fall, increased confusion 
  
Multiple axial images obtained through the brain without intravenous contrast.  
Radiation dose reduction techniques were used for this study: All CT scans 
performed at this facility use one or all of the following: Automated exposure 
control, adjustment of the mA and/or kVp according to patient's size, iterative 
reconstruction. Compared with 04/28/2018. 
  
FINDINGS: There is stable bioccipital atrophy. There is also mild chronic 
change in the white matter. There is no CT evidence of acute hemorrhage or 
infarction. The ventricles are normal in size. There are no extra-axial fluid 
collections. No masses are seen. The sinuses are clear.  There are no bony lesions. 
  
IMPRESSION: No CT evidence of acute intracranial abnormality. 18: Resting in bed. C/o pain to Left knee and lower extremity. H/H 9.3/ 29.5. Creat 1.75. INR 3.4. 
18 No changes; coumadin on hold INR coming down; fall precautions Past Medical History:  
Diagnosis Date  Anticoagulated on Coumadin 2013 S/P AVR  CAD (coronary artery disease) 2013 PCI LAD with stent placed  Cardiomyopathy ([facility]  CHF (congestive heart failure) ([facility] 2017  CKD (chronic kidney disease) stage 3, GFR 30-59 ml/min 7/10/2013  COPD (chronic obstructive pulmonary disease) ([facility] 2014  Essential hypertension, benign 2013  HLD (hyperlipidemia)  ICD (implantable cardioverter-defibrillator) in place 10/2/2014 Biotronik single-chamber ICD implantation 10/20/14  Iron deficiency anemia due to chronic blood loss 2009  MDS (myelodysplastic syndrome) ([facility] 2011 Procrit started in 11 Procrit weekly and Iron stores. 12 good response to 3 weekly procrit did not need it last time  3- Pt doing well. Just wanted a \"check-up. \" Responding to Procrit every three weeks. 13 patient has missed some injections on recently restarted hemoglobin only issue , takes oral iron iron stores each time  REJI (obstructive sleep apnea)-cpap 2014  Osteoarthritis  Osteopenia  Quadrantanopia  Visual complaint 2015 Past Surgical History:  
Procedure Laterality Date [address]    HX AORTIC VALVE REPLACEMENT  ,   
 mechanical valve   HX  SECTION    
 HX CORONARY STENT PLACEMENT  May, 2014 STEMI with Stent placement.  HX ENDOSCOPY  2009 EGD [address] CATHETERIZATION    HX PACEMAKER  10/2/2014 Biotronik ICD  HX TUBAL LIGATION    
 IR BX BONE MARROW DIAGNOSTIC  2011 Current Facility-Administered Medications Medication Dose Route Frequency  traMADol (ULTRAM) tablet 50 mg  50 mg Oral Q6H PRN  
 tuberculin injection 5 Units  5 Units IntraDERMal ONCE  
 morphine injection 2 mg  2 mg IntraVENous Q4H PRN  
 [START ON 9/10/2018] aspirin delayed-release tablet 81 mg  81 mg Oral DAILY  0.9% sodium chloride infusion 250 mL  250 mL IntraVENous PRN  
 0.9% sodium chloride infusion  50 mL/hr IntraVENous CONTINUOUS  
 albuterol (PROVENTIL VENTOLIN) nebulizer solution 2.5 mg  2.5 mg Nebulization Q4H PRN  
 ascorbic acid (vitamin C) (VITAMIN C) tablet 500 mg  500 mg Oral DAILY  carvedilol (COREG) tablet 6.25 mg  6.25 mg Oral BID WITH MEALS  escitalopram oxalate (LEXAPRO) tablet 20 mg  20 mg Oral DAILY  furosemide (LASIX) tablet 80 mg  80 mg Oral BID  isosorbide mononitrate ER (IMDUR) tablet 30 mg  30 mg Oral DAILY  simvastatin (ZOCOR) tablet 20 mg  20 mg Oral QHS  tiotropium (SPIRIVA) inhalation capsule 18 mcg  18 mcg Inhalation DAILY  sodium chloride (NS) flush 5-10 mL  5-10 mL IntraVENous Q8H  
 sodium chloride (NS) flush 5-10 mL  5-10 mL IntraVENous PRN  
 acetaminophen (TYLENOL) tablet 650 mg  650 mg Oral Q6H PRN  
 ondansetron (ZOFRAN) injection 4 mg  4 mg IntraVENous Q4H PRN  
 insulin lispro (HUMALOG) injection   SubCUTAneous AC&HS  influenza vaccine 2018-19 (6 mos+)(PF) (FLUARIX QUAD/FLULAVAL QUAD) injection 0.5 mL  0.5 mL IntraMUSCular PRIOR TO DISCHARGE  budesonide (PULMICORT) 500 mcg/2 ml nebulizer suspension  500 mcg Nebulization BID RT And  
 albuterol (PROVENTIL VENTOLIN) nebulizer solution 2.5 mg  2.5 mg Nebulization Q6HWA RT  
 WARFARIN INFORMATION NOTE (COUMADIN)   Other Rx Dosing/Monitoring Sulfa (sulfonamide antibiotics) and Aspirin Social History Social History  Marital status:  Spouse name: N/A  
 Number of children: N/A  
 Years of education: N/A Occupational History   Retired  
  [employer] Social History Main Topics  Smoking status: Former Smoker Packs/day: 0.25 Years: 42.00 Types: Cigarettes Start date: 10/1/1956 Quit date: 5/1/2014  Smokeless tobacco: Never Used  Alcohol use No  
 Drug use: No  
 Sexual activity: Not Asked Other Topics Concern  Weight Concern Yes  Special Diet Yes Social History Narrative Lives with her daughter. Ambulates only short distances. History Smoking Status  Former Smoker  Packs/day: 0.25  
 Years: 42.00  Types: Cigarettes  Start date: 10/1/1956  Quit date: 5/1/2014 Smokeless Tobacco  
 Never Used Family History Problem Relation Age of Onset  Heart Disease Mother CHF  Hypertension Mother  Kidney Disease Mother  Heart Disease Father CHF  Lung Disease Father  Diabetes Father  Cancer Father   
  prostate  Hypertension Father  Heart Attack Father  Heart Disease Maternal Aunt  Diabetes Brother  Coronary Artery Disease Other  Breast Cancer Neg Hx   
 
ROS:  Comprehensive review of systems was otherwise unremarkable except as noted above. Physical Exam:  
Visit Vitals  /68 (BP 1 Location: Right arm, BP Patient Position: Head of bed elevated (Comment degrees))  Pulse 71  Temp 97.1 °F (36.2 °C)  Resp 18  Ht 5' 2\" (1.575 m)  Wt 226 lb 9.6 oz (102.8 kg)  SpO2 100%  BMI 41.45 kg/m2 Vitals:  
 09/08/18 2345 09/09/18 8966 09/09/18 0740 09/09/18 3798 BP: 135/64 139/69  143/68 Pulse: 68 69  71 Resp: 18 15  18 Temp: 97.9 °F (36.6 °C) 97.9 °F (36.6 °C)  97.1 °F (36.2 °C) SpO2: 95% 97% 93% 100% Weight:      
Height:      
 
09/09 0701 - 09/09 1900 In: 320 [P.O.:320] Out: 800 [Urine:800] 09/07 1901 - 09/09 0700 In: 9025 [P.O.:560; I.V.:723] Out: 1250 [FHJUM:8035] Constitutional: Alert, cooperative, no acute distress; appears stated age Eyes:Sclera are clear. EOMs intact ENMT: no external lesions gross hearing normal; no obvious neck masses, no ear or lip lesions, nares normal 
CV: RRR. Normal perfusion, Mild edema Resp: No JVD. Breathing is  non-labored; no audible wheezing. GI: soft, non-tender, non-distended Integument: diffuse bruising and fluid filled blister/epidermolysis x 2 to Left anterior shin/knee No cellulitis Musculoskeletal: unremarkable with normal function. No embolic signs or cyanosis. Neuro:  Oriented; moves all 4; no focal deficits Psychiatric: normal affect and mood, no memory impairment Recent vitals (if inpt): 
Patient Vitals for the past 24 hrs: 
 BP Temp Pulse Resp SpO2 Weight 09/09/18 0758 143/68 97.1 °F (36.2 °C) 71 18 100 % -  
09/09/18 0740 - - - - 93 % -  
09/09/18 0351 139/69 97.9 °F (36.6 °C) 69 15 97 % -  
09/08/18 2345 135/64 97.9 °F (36.6 °C) 68 18 95 % -  
09/08/18 2230 - - - - 95 % -  
09/08/18 2121 - - - - - 226 lb 9.6 oz (102.8 kg) 09/08/18 1946 109/57 98.5 °F (36.9 °C) 68 17 93 % -  
09/08/18 1922 - - - - 95 % -  
09/08/18 1515 130/63 96.6 °F (35.9 °C) 69 18 94 % -  
09/08/18 1325 - - - - 97 % -  
09/08/18 1111 120/53 98.2 °F (36.8 °C) 69 18 96 % - Labs: 
Recent Labs  
   09/09/18 
 0459   09/07/18 
 1752 WBC  7.0   < >  6.9 HGB  9.0*   < >  6.5*  
PLT  193   < >  186 NA  142   < >  136  
K  5.0   < >  5.2*  
CL  98   < >  94* CO2  38*   < >  40* BUN  55*   < >  68* CREA  1.56*   < >  2.19* GLU  87   < >  139* PTP  28.5*   < >  39.5* INR  2.8   < >  4.3* TBILI   --    --   0.8 SGOT   --    --   11* ALT   --    --   17  
AP   --    --   48*  
 < > = values in this interval not displayed. Lab Results Component Value Date/Time  WBC 7.0 09/09/2018 04:59 AM  
 HGB 9.0 (L) 09/09/2018 04:59 AM  
 PLATELET 903 03/32/8069 04:59 AM  
 Sodium 142 09/09/2018 04:59 AM  
 Potassium 5.0 09/09/2018 04:59 AM  
 Chloride 98 09/09/2018 04:59 AM  
 CO2 38 (H) 09/09/2018 04:59 AM  
 BUN 55 (H) 09/09/2018 04:59 AM  
 Creatinine 1.56 (H) 09/09/2018 04:59 AM  
 Glucose 87 09/09/2018 04:59 AM  
 INR 2.8 09/09/2018 04:59 AM  
 aPTT 33.8 (H) 12/01/2016 02:20 AM  
 Bilirubin, total 0.8 09/07/2018 05:52 PM  
 AST (SGOT) 11 (L) 09/07/2018 05:52 PM  
 ALT (SGPT) 17 09/07/2018 05:52 PM  
 Alk. phosphatase 48 (L) 09/07/2018 05:52 PM  
 Amylase 88 01/31/2012 03:12 PM  
 Lipase 96 04/14/2014 02:40 PM  
 Lactic acid 0.7 02/16/2016 06:16 AM  
 Troponin-I <0.05 01/31/2012 03:32 PM  
 Troponin-I, Qt. 0.08 (H) 12/01/2016 02:20 AM  
 
 
 
I reviewed recent labs and recent radiologic studies. I independently reviewed radiology images for studies I described above or studies I have ordered. Admission date (for inpatients): 9/7/2018 * No surgery found *  * No surgery found * ASSESSMENT/PLAN: 
Problem List  Date Reviewed: 8/31/2018 Codes Class Noted [facility]opathy ([facility] INR >4 on admission 9/7/18 ICD-10-CM: D68.9 ICD-9-CM: 286.9  9/8/2018 Traumatic hematoma of left knee ICD-10-CM: A38.31IO ICD-9-CM: 924.11  9/8/2018 Debility ICD-10-CM: R53.81 ICD-9-CM: 799.3  9/8/2018 * (Principal)Anemia ICD-10-CM: D64.9 ICD-9-CM: 285.9  9/7/2018 Chronic respiratory failure with hypoxia (HCC) (Chronic) ICD-10-CM: J96.11 
ICD-9-CM: 518.83, 799.02  9/7/2018 Acute kidney injury superimposed on chronic kidney disease ([facility] ICD-10-CM: N17.9, N18.9 ICD-9-CM: 866.00, 585.9  9/7/2018 Encounter for immunization ICD-10-CM: H16 ICD-9-CM: V03.89  8/21/2018 Obesity, morbid ([facility] ICD-10-CM: E66.01 
ICD-9-CM: 278.01  6/8/2018 Physical debility ICD-10-CM: R53.81 ICD-9-CM: 799.3  5/2/2018 Closed nondisplaced fracture of shaft of fifth metacarpal bone of left hand ICD-10-CM: T55.390T ICD-9-CM: 815.03  4/28/2018 Subdural hematoma (HCC) ICD-10-CM: I62.00 ICD-9-CM: 432.1  4/27/2018  Long term (current) use of anticoagulants (Chronic) ICD-10-CM: Z79.01 
 ICD-9-CM: V58.61  4/19/2018 Dysthymia ICD-10-CM: F34.1 ICD-9-CM: 300.4  4/5/2018 Type 2 diabetes mellitus with nephropathy (HCC) (Chronic) ICD-10-CM: E11.21 
ICD-9-CM: 250.40, 583.81  12/18/2017 Pulmonary hypertension (HCC) (Chronic) ICD-10-CM: I27.20 ICD-9-CM: 416.8  6/15/2016 S/P AVR (aortic valve replacement) (Chronic) ICD-10-CM: Z95.2 ICD-9-CM: V43.3  Unknown Overview Signed 2/23/2016 11:04 AM by Giovany Edmondson Mechanical/Artific Cardiomyopathy ([facility] (Chronic) ICD-10-CM: I42.9 ICD-9-CM: 425.4  Unknown Osteopenia ICD-10-CM: M85.80 ICD-9-CM: 733.90  Unknown HLD (hyperlipidemia) (Chronic) ICD-10-CM: V82.5 ICD-9-CM: 272.4  Unknown Osteoarthritis ICD-10-CM: M19.90 ICD-9-CM: 715.90  Unknown  
   
 ICD (implantable cardioverter-defibrillator) in place ICD-10-CM: Z95.810 ICD-9-CM: V45.02  10/2/2014 Overview Signed 10/2/2014  4:58 PM by [employer] III Bi[employer]ronik single-chamber ICD implantation 10/20/14 Diabetes mellitus type 2, diet-controlled (HCC) (Chronic) ICD-10-CM: E11.9 ICD-9-CM: 250.00  8/28/2014 COPD (chronic obstructive pulmonary disease) (HCC) (Chronic) ICD-10-CM: J44.9 ICD-9-CM: 487  4/2/2014 REJI (obstructive sleep apnea)-cpap (Chronic) ICD-10-CM: G47.33 
ICD-9-CM: 327.23  4/2/2014 CKD (chronic kidney disease) stage 3, GFR 30-59 ml/min (Chronic) ICD-10-CM: N18.3 ICD-9-CM: 585.3  7/10/2013 Anticoagulated on Coumadin (Chronic) ICD-10-CM: Z51.81, Z79.01 
ICD-9-CM: V58.83, V58.61  7/9/2013 Overview Signed 7/9/2013  4:16 PM by Raiza Ness NP  
  S/P AVR 
  
  
   
 CAD (coronary artery disease) (Chronic) ICD-10-CM: I25.10 ICD-9-CM: 414.00  1/20/2013 Overview Signed 5/20/2014 10:05 AM by Angelina Ramirez NP  
  5/8/14 PCI LAD with stent placed Chronic combined systolic and diastolic heart failure (HCC) (Chronic) ICD-10-CM: I50.42 
ICD-9-CM: 428.42  1/20/2013 Overview Addendum 4/28/2018  4:53 AM by Maria A Rodgers MD  
  5/8/14 ECHO:  EF 10-15% 12/2017:  EF 25-30% Essential hypertension, benign (Chronic) ICD-10-CM: I10 
ICD-9-CM: 401.1  1/20/2013 MDS (myelodysplastic syndrome) (HCC) (Chronic) ICD-10-CM: D46.9 ICD-9-CM: 238.75  12/17/2011 Overview Addendum 8/29/2013 11:06 AM by Timothy Silverman Procrit started in August, 2011 12/18/11 Procrit weekly and Iron stores. 5-12 12-13-12 good response to 3 weekly procrit did not need it last time 3-7-13 Pt doing well. Just wanted a \"check-up. \" Responding to Procrit every three weeks. 8-29-13 patient has missed some injections on recently restarted hemoglobin only issue , takes oral iron iron stores each time Iron deficiency anemia due to chronic blood loss (Chronic) ICD-10-CM: D50.0 ICD-9-CM: 280.0  7/29/2009 Principal Problem: Anemia (9/7/2018) Active Problems: 
  [facility]odysplastic [facility] (12/17/2011) Overview: Procrit started in August, 2011 12/18/11 Procrit weekly and Iron stores. 5-12 12-13-12 good response to 3 weekly procrit did not need it last time 3-7-13 Pt doing well. Just wanted a \"check-up. \" Responding to Procrit  
    every three weeks. 8-29-13 patient has missed some injections on recently restarted  
    hemoglobin only issue , takes oral iron iron stores each time CAD (coronary artery disease) (1/20/2013) Overview: 5/8/14 PCI LAD with stent placed Chronic combined systolic and diastolic heart failure ([facility] (1/20/2013) Overview: 5/8/14 ECHO:  EF 10-15% 12/2017:  EF 25-30% Essential hypertension, benign (1/20/2013) Anticoagulated on Coumadin (7/9/2013) Overview: S/P AVR 
 
  CKD (chronic kidney disease) stage 3, GFR 30-59 ml/min (7/10/2013) COPD (chronic obstructive pulmonary disease) ([facility] (4/2/2014) REJI (obstructive sleep apnea)-cpap (4/2/2014) Diabetes mellitus type 2, diet-controlled ([address] (8/28/2014) ICD (implantable cardioverter-defibrillator) in place (10/2/2014) Overview: Bi[employer]ronik single-chamber ICD implantation 10/20/14 S/P AVR (aortic valve replacement) () Overview: Mechanical/Artific Obesity, morbid ([address] (6/8/2018) Chronic respiratory failure with hypoxia ([address] (9/7/2018) Acute kidney injury superimposed on chronic kidney disease ([address] (9/7/2018) Coagulopathy ([address] INR >4 on admission 9/7/18 (9/8/2018) Traumatic hematoma of left knee (9/8/2018) Debility (9/8/2018) Plan: 
INR 3.4-->2.8, Pharmacy consulted for dosing with goal INR 2.5 to 3.5, followup daily INR Will follow left knee injury No cellulitis at present I will likely debrided epidermolysis soon when INR a little lower Debility/Falls-->Recommend SNF/rehab after hospitalization Terrence Gonzalez MD, FACS

## 2018-09-09 NOTE — PROGRESS NOTES
Hospitalist Progress Note Admit Date:  2018  5:55 PM  
Name:  Michoacano Moore Age:  77 y.o. 
:  1950 MRN:  [id number] PCP:  Nupur Martínez MD 
Treatment Team: Attending Provider: Adria Atkins MD; Utilization Review: Severo Ou; Consulting Provider: Lynn Pagan NP; Surgeon: Stacie Harley MD 
 
Subjective: The patient feels better today, her fatigue has improved. She c/o pain on the left knee where a big hematoma is visible. Objective:  
Patient Vitals for the past 24 hrs: 
 Temp Pulse Resp BP SpO2  
18 1514 98.9 °F (37.2 °C) 70 18 137/68 94 % 18 1333 - - - - 96 % 18 1102 98.2 °F (36.8 °C) 68 18 120/65 96 % 18 0758 97.1 °F (36.2 °C) 71 18 143/68 100 % 18 0740 - - - - 93 % 18 0351 97.9 °F (36.6 °C) 69 15 139/69 97 % 18 2345 97.9 °F (36.6 °C) 68 18 135/64 95 % 18 2230 - - - - 95 % 18 1946 98.5 °F (36.9 °C) 68 17 109/57 93 % 18 1922 - - - - 95 % Oxygen Therapy O2 Sat (%): 94 % (18 1514) Pulse via Oximetry: 68 beats per minute (18 1333) O2 Device: Nasal cannula (18 1514) O2 Flow Rate (L/min): 3 l/min (18 1514) FIO2 (%): 32 % (18 0500) Intake/Output Summary (Last 24 hours) at 18 1651 Last data filed at 18 1515 Gross per 24 hour Intake             1723 ml Output             2700 ml Net             -977 ml General:    Well nourished. Alert. CV:   RRR. No murmur, rub, or gallop. Lungs:   Clear to auscultation bilaterally. No wheezing, rhonchi, or rales. Abdomen:   Soft, nontender, nondistended. Extremities: Warm and dry. No cyanosis or edema. Hematoma visible on the left knee, tender on palpation. Skin:     No rashes or jaundice, except for ecchymoses and hematoma on the LLE. Data Review: 
I have reviewed all labs, meds, telemetry events, and studies from the last 24 hours. Recent Results (from the past 24 hour(s)) GLUCOSE, POC Collection Time: 09/08/18  8:05 PM  
Result Value Ref Range Glucose (POC) 158 (H) 65 - 100 mg/dL METABOLIC PANEL, BASIC Collection Time: 09/09/18  4:59 AM  
Result Value Ref Range Sodium 142 136 - 145 mmol/L Potassium 5.0 3.5 - 5.1 mmol/L Chloride 98 98 - 107 mmol/L  
 CO2 38 (H) 21 - 32 mmol/L Anion gap 6 (L) 7 - 16 mmol/L Glucose 87 65 - 100 mg/dL BUN 55 (H) 8 - 23 MG/DL Creatinine 1.56 (H) 0.6 - 1.0 MG/DL  
 GFR est AA 42 (L) >60 ml/min/1.73m2 GFR est non-AA 35 (L) >60 ml/min/1.73m2 Calcium 9.6 8.3 - 10.4 MG/DL  
CBC WITH AUTOMATED DIFF Collection Time: 09/09/18  4:59 AM  
Result Value Ref Range WBC 7.0 4.3 - 11.1 K/uL  
 RBC 3.15 (L) 4.05 - 5.2 M/uL HGB 9.0 (L) 11.7 - 15.4 g/dL HCT 30.1 (L) 35.8 - 46.3 % MCV 95.6 79.6 - 97.8 FL  
 MCH 28.6 26.1 - 32.9 PG  
 MCHC 29.9 (L) 31.4 - 35.0 g/dL  
 RDW 15.9 % PLATELET 088 876 - 349 K/uL MPV 10.2 9.4 - 12.3 FL ABSOLUTE NRBC 0.00 0.0 - 0.2 K/uL  
 DF AUTOMATED NEUTROPHILS 66 43 - 78 % LYMPHOCYTES 21 13 - 44 % MONOCYTES 10 4.0 - 12.0 % EOSINOPHILS 2 0.5 - 7.8 % BASOPHILS 0 0.0 - 2.0 % IMMATURE GRANULOCYTES 0 0.0 - 5.0 %  
 ABS. NEUTROPHILS 4.7 1.7 - 8.2 K/UL  
 ABS. LYMPHOCYTES 1.5 0.5 - 4.6 K/UL  
 ABS. MONOCYTES 0.7 0.1 - 1.3 K/UL  
 ABS. EOSINOPHILS 0.1 0.0 - 0.8 K/UL  
 ABS. BASOPHILS 0.0 0.0 - 0.2 K/UL  
 ABS. IMM. GRANS. 0.0 0.0 - 0.5 K/UL PROTHROMBIN TIME + INR Collection Time: 09/09/18  4:59 AM  
Result Value Ref Range Prothrombin time 28.5 (H) 11.5 - 14.5 sec INR 2.8 GLUCOSE, POC Collection Time: 09/09/18  7:53 AM  
Result Value Ref Range Glucose (POC) 108 (H) 65 - 100 mg/dL GLUCOSE, POC Collection Time: 09/09/18 11:02 AM  
Result Value Ref Range Glucose (POC) 244 (H) 65 - 100 mg/dL GLUCOSE, POC Collection Time: 09/09/18  4:06 PM  
Result Value Ref Range Glucose (POC) 97 65 - 100 mg/dL All Micro Results Procedure Component Value Units Date/Time INFLUENZA A & B AG (RAPID TEST) [[id number]] Collected:  09/07/18 2139 Order Status:  Completed Specimen:  Nasopharyngeal from Nasal washing Updated:  09/07/18 2205 Influenza A Ag NEGATIVE      
   NEGATIVE FOR THE PRESENCE OF INFLUENZA A ANTIGEN 
INFECTION DUE TO INFLUENZA A CANNOT BE RULED OUT. BECAUSE THE ANTIGEN PRESENT IN THE SAMPLE MAY BE BELOW 
THE DETECTION LIMIT OF THE TEST. A NEGATIVE TEST IS PRESUMPTIVE AND IT IS RECOMMENDED THAT THESE RESULTS BE CONFIRMED BY VIRAL CULTURE OR AN FDA-CLEARED INFLUENZA A AND B MOLECULAR ASSAY. Influenza B Ag NEGATIVE      
   NEGATIVE FOR THE PRESENCE OF INFLUENZA B ANTIGEN 
INFECTION DUE TO INFLUENZA B CANNOT BE RULED OUT. BECAUSE THE ANTIGEN PRESENT IN THE SAMPLE MAY BE BELOW 
THE DETECTION LIMIT OF THE TEST. A NEGATIVE TEST IS PRESUMPTIVE AND IT IS RECOMMENDED THAT THESE RESULTS BE CONFIRMED BY VIRAL CULTURE OR AN FDA-CLEARED INFLUENZA A AND B MOLECULAR ASSAY. Source NASOPHARYNGEAL Current Meds: 
Current Facility-Administered Medications Medication Dose Route Frequency  traMADol (ULTRAM) tablet 50 mg  50 mg Oral Q6H PRN  
 tuberculin injection 5 Units  5 Units IntraDERMal ONCE  
 [START ON 9/10/2018] warfarin (COUMADIN) tablet 2 mg  2 mg Oral QPM  
 morphine injection 2 mg  2 mg IntraVENous Q4H PRN  
 [START ON 9/10/2018] aspirin delayed-release tablet 81 mg  81 mg Oral DAILY  0.9% sodium chloride infusion 250 mL  250 mL IntraVENous PRN  
 albuterol (PROVENTIL VENTOLIN) nebulizer solution 2.5 mg  2.5 mg Nebulization Q4H PRN  
 ascorbic acid (vitamin C) (VITAMIN C) tablet 500 mg  500 mg Oral DAILY  carvedilol (COREG) tablet 6.25 mg  6.25 mg Oral BID WITH MEALS  escitalopram oxalate (LEXAPRO) tablet 20 mg  20 mg Oral DAILY  furosemide (LASIX) tablet 80 mg  80 mg Oral BID  isosorbide mononitrate ER (IMDUR) tablet 30 mg  30 mg Oral DAILY  simvastatin (ZOCOR) tablet 20 mg  20 mg Oral QHS  tiotropium (SPIRIVA) inhalation capsule 18 mcg  18 mcg Inhalation DAILY  sodium chloride (NS) flush 5-10 mL  5-10 mL IntraVENous Q8H  
 sodium chloride (NS) flush 5-10 mL  5-10 mL IntraVENous PRN  
 acetaminophen (TYLENOL) tablet 650 mg  650 mg Oral Q6H PRN  
 ondansetron (ZOFRAN) injection 4 mg  4 mg IntraVENous Q4H PRN  
 insulin lispro (HUMALOG) injection   SubCUTAneous AC&HS  influenza vaccine 2018-19 (6 mos+)(PF) (FLUARIX QUAD/FLULAVAL QUAD) injection 0.5 mL  0.5 mL IntraMUSCular PRIOR TO DISCHARGE  budesonide (PULMICORT) 500 mcg/2 ml nebulizer suspension  500 mcg Nebulization BID RT And  
 albuterol (PROVENTIL VENTOLIN) nebulizer solution 2.5 mg  2.5 mg Nebulization Q6HWA RT Other Studies (last 24 hours): No results found. Assessment and Plan:  
 
Hospital Problems as of 9/9/2018  Date Reviewed: 8/31/2018 Codes Class Noted - Resolved POA Co[facility]opathy [facility] INR >4 on admission 9/7/18 ICD-10-CM: D68.9 ICD-9-CM: 286.9  9/8/2018 - Present Yes Traumatic hematoma of left knee ICD-10-CM: G32.31LG ICD-9-CM: 924.11  9/8/2018 - Present Yes Debility ICD-10-CM: R53.81 ICD-9-CM: 799.3  9/8/2018 - Present Yes * (Principal)Anemia ICD-10-CM: D64.9 ICD-9-CM: 285.9  9/7/2018 - Present Yes Chronic respiratory failure with hypoxia (HCC) (Chronic) ICD-10-CM: J96.11 
ICD-9-CM: 518.83, 799.02  9/7/2018 - Present Yes Acute kidney injury superimposed on chronic kidney disease ([facility] ICD-10-CM: N17.9, N18.9 ICD-9-CM: 866.00, 585.9  9/7/2018 - Present Yes Obesity, morbid ([facility] ICD-10-CM: E66.01 
ICD-9-CM: 278.01  6/8/2018 - Present Yes S/P AVR (aortic valve replacement) (Chronic) ICD-10-CM: Z95.2 ICD-9-CM: V43.3  Unknown - Present Yes Overview Signed 2/23/2016 11:04 AM by Kirstin Matute [employer] ICD (implantable cardioverter-defibrillator) in place ICD-10-CM: Z95.810 ICD-9-CM: V45.02  10/2/2014 - Present Yes Overview Signed 10/2/2014  4:58 PM by Lo Walker III [employer] single-chamber ICD implantation 10/20/14 Diabetes mellitus type 2, diet-controlled (HCC) (Chronic) ICD-10-CM: E11.9 ICD-9-CM: 250.00  8/28/2014 - Present Yes COPD (chronic obstructive pulmonary disease) (HCC) (Chronic) ICD-10-CM: J44.9 ICD-9-CM: 009  4/2/2014 - Present Yes  
   
 REJI (obstructive sleep apnea)-cpap (Chronic) ICD-10-CM: R28.37 
ICD-9-CM: 327.23  4/2/2014 - Present Yes  
   
 CKD (chronic kidney disease) stage 3, GFR 30-59 ml/min (Chronic) ICD-10-CM: N18.3 ICD-9-CM: 585.3  7/10/2013 - Present Yes Anticoagulated on Coumadin (Chronic) ICD-10-CM: Z51.81, Z79.01 
ICD-9-CM: V58.83, V58.61  7/9/2013 - Present Yes Overview Signed 7/9/2013  4:16 PM by Usha Love NP  
  S/P AVR 
  
  
   
 CAD (coronary artery disease) (Chronic) ICD-10-CM: I25.10 ICD-9-CM: 414.00  1/20/2013 - Present Yes Overview Signed 5/20/2014 10:05 AM by Pam Parsons NP  
  5/8/14 PCI LAD with stent placed Chronic combined systolic and diastolic heart failure (HCC) (Chronic) ICD-10-CM: I50.42 
ICD-9-CM: 428.42  1/20/2013 - Present Yes Overview Addendum 4/28/2018  4:53 AM by Arden Jamison MD  
  5/8/14 ECHO:  EF 10-15% 12/2017:  EF 25-30% Essential hypertension, benign (Chronic) ICD-10-CM: I10 
ICD-9-CM: 401.1  1/20/2013 - Present Yes  
   
 MDS (myelodysplastic syndrome) (HCC) (Chronic) ICD-10-CM: D46.9 ICD-9-CM: 238.75  12/17/2011 - Present Yes Overview Addendum 8/29/2013 11:06 AM by Sarina Stein Procrit started in August, 2011 12/18/11 Procrit weekly and Iron stores. 5-12 12-13-12 good response to 3 weekly procrit did not need it last time 3-7-13 Pt doing well. Just wanted a \"check-up. \" Responding to Procrit every three weeks. 8-29-13 patient has missed some injections on recently restarted hemoglobin only issue , takes oral iron iron stores each time  
 
  
  
   
  
 
 
  
1 - Acute on Chronic Anemia 2 - CKD stage 3, improved. FELIZ is actually resolved. Cr down from 2.19 to 1.56 (baseline 1.6-1.8). 3 - Supratherapeutic INR, last INR 2.8 
4 - Left Knee Hematoma 5 - Possible MDS 
6 - HTN 
7 - DM 2 
8 - CAD s/p stent 9 - Chronic Systolic CHF s/p AICD 
10 - Chronic hypoxemic respiratory failure on home O2/ REJI on Trilogy 11 - Mechanical Aortic Valve 
  PLAN:   
· Hb 9.0 today · Continue to monitor Hb · Discontinue IV fluids · Continue to hold Coumadin. Pharmacy assistance for dosing. · Surgery consult for left knee hematoma · Continue home medications for chronic conditions · Physical Therapy evaluation and Case Management consultation for possible placement into SNF for Rehab 
  
DC planning/Dispo: Home vs Rehab in 1-2 days DVT ppx: the patient is on Coumadin Signed: Mickie Braga MD

## 2018-09-09 NOTE — PROGRESS NOTES
Problem: Falls - Risk of 
Goal: *Absence of Falls Document Trcay Briones Fall Risk and appropriate interventions in the flowsheet. Outcome: Progressing Towards Goal 
Fall Risk Interventions: 
Mobility Interventions: Communicate number of staff needed for ambulation/transfer, Patient to call before getting OOB Medication Interventions: Patient to call before getting OOB, Teach patient to arise slowly Elimination Interventions: Call light in reach, Patient to call for help with toileting needs, Toileting schedule/hourly rounds History of Falls Interventions: Investigate reason for fall Problem: Pressure Injury - Risk of 
Goal: *Prevention of pressure injury Document Bahman Scale and appropriate interventions in the flowsheet. Outcome: Progressing Towards Goal 
Pressure Injury Interventions: 
  
 
Moisture Interventions: Check for incontinence Q2 hours and as needed, Apply protective barrier, creams and emollients, Absorbent underpads Activity Interventions: Increase time out of bed, Pressure redistribution bed/mattress(bed type), PT/OT evaluation Mobility Interventions: Pressure redistribution bed/mattress (bed type), PT/OT evaluation Nutrition Interventions: Document food/fluid/supplement intake, Offer support with meals,snacks and hydration Friction and Shear Interventions: HOB 30 degrees or less

## 2018-09-10 LAB
ANION GAP SERPL CALC-SCNC: 7 MMOL/L (ref 7–16)
BASOPHILS # BLD: 0 K/UL (ref 0–0.2)
BASOPHILS NFR BLD: 0 % (ref 0–2)
BUN SERPL-MCNC: 55 MG/DL (ref 8–23)
CALCIUM SERPL-MCNC: 9.8 MG/DL (ref 8.3–10.4)
CHLORIDE SERPL-SCNC: 91 MMOL/L (ref 98–107)
CO2 SERPL-SCNC: 42 MMOL/L (ref 21–32)
CREAT SERPL-MCNC: 1.55 MG/DL (ref 0.6–1)
DIFFERENTIAL METHOD BLD: ABNORMAL
EOSINOPHIL # BLD: 0.2 K/UL (ref 0–0.8)
EOSINOPHIL NFR BLD: 2 % (ref 0.5–7.8)
ERYTHROCYTE [DISTWIDTH] IN BLOOD BY AUTOMATED COUNT: 15.4 %
GLUCOSE BLD STRIP.AUTO-MCNC: 108 MG/DL (ref 65–100)
GLUCOSE BLD STRIP.AUTO-MCNC: 115 MG/DL (ref 65–100)
GLUCOSE BLD STRIP.AUTO-MCNC: 153 MG/DL (ref 65–100)
GLUCOSE BLD STRIP.AUTO-MCNC: 157 MG/DL (ref 65–100)
GLUCOSE SERPL-MCNC: 100 MG/DL (ref 65–100)
HCT VFR BLD AUTO: 30.7 % (ref 35.8–46.3)
HGB BLD-MCNC: 9.6 G/DL (ref 11.7–15.4)
IMM GRANULOCYTES # BLD: 0 K/UL (ref 0–0.5)
IMM GRANULOCYTES NFR BLD AUTO: 0 % (ref 0–5)
INR PPP: 2
LYMPHOCYTES # BLD: 1.7 K/UL (ref 0.5–4.6)
LYMPHOCYTES NFR BLD: 20 % (ref 13–44)
MCH RBC QN AUTO: 29.4 PG (ref 26.1–32.9)
MCHC RBC AUTO-ENTMCNC: 31.3 G/DL (ref 31.4–35)
MCV RBC AUTO: 94.2 FL (ref 79.6–97.8)
MM INDURATION POC: 0 MM (ref 0–5)
MONOCYTES # BLD: 0.8 K/UL (ref 0.1–1.3)
MONOCYTES NFR BLD: 9 % (ref 4–12)
NEUTS SEG # BLD: 5.5 K/UL (ref 1.7–8.2)
NEUTS SEG NFR BLD: 68 % (ref 43–78)
NRBC # BLD: 0 K/UL (ref 0–0.2)
PLATELET # BLD AUTO: 213 K/UL (ref 150–450)
PMV BLD AUTO: 10.3 FL (ref 9.4–12.3)
POTASSIUM SERPL-SCNC: 4.6 MMOL/L (ref 3.5–5.1)
PPD POC: NEGATIVE NEGATIVE
PROTHROMBIN TIME: 21.5 SEC (ref 11.5–14.5)
RBC # BLD AUTO: 3.26 M/UL (ref 4.05–5.2)
SODIUM SERPL-SCNC: 140 MMOL/L (ref 136–145)
WBC # BLD AUTO: 8.2 K/UL (ref 4.3–11.1)

## 2018-09-10 PROCEDURE — 85610 PROTHROMBIN TIME: CPT

## 2018-09-10 PROCEDURE — 94640 AIRWAY INHALATION TREATMENT: CPT

## 2018-09-10 PROCEDURE — 74011250637 HC RX REV CODE- 250/637: Performed by: SURGERY

## 2018-09-10 PROCEDURE — 82962 GLUCOSE BLOOD TEST: CPT

## 2018-09-10 PROCEDURE — 74011000250 HC RX REV CODE- 250: Performed by: INTERNAL MEDICINE

## 2018-09-10 PROCEDURE — 74011636637 HC RX REV CODE- 636/637: Performed by: INTERNAL MEDICINE

## 2018-09-10 PROCEDURE — 97166 OT EVAL MOD COMPLEX 45 MIN: CPT

## 2018-09-10 PROCEDURE — 97535 SELF CARE MNGMENT TRAINING: CPT

## 2018-09-10 PROCEDURE — 94660 CPAP INITIATION&MGMT: CPT

## 2018-09-10 PROCEDURE — 36415 COLL VENOUS BLD VENIPUNCTURE: CPT

## 2018-09-10 PROCEDURE — 85025 COMPLETE CBC W/AUTO DIFF WBC: CPT

## 2018-09-10 PROCEDURE — 74011250637 HC RX REV CODE- 250/637: Performed by: INTERNAL MEDICINE

## 2018-09-10 PROCEDURE — 77010033678 HC OXYGEN DAILY

## 2018-09-10 PROCEDURE — 80048 BASIC METABOLIC PNL TOTAL CA: CPT

## 2018-09-10 PROCEDURE — 99222 1ST HOSP IP/OBS MODERATE 55: CPT | Performed by: PHYSICAL MEDICINE & REHABILITATION

## 2018-09-10 PROCEDURE — 94760 N-INVAS EAR/PLS OXIMETRY 1: CPT

## 2018-09-10 PROCEDURE — 97530 THERAPEUTIC ACTIVITIES: CPT

## 2018-09-10 PROCEDURE — 65270000029 HC RM PRIVATE

## 2018-09-10 RX ORDER — POLYETHYLENE GLYCOL 3350 17 G/17G
17 POWDER, FOR SOLUTION ORAL DAILY PRN
Status: DISCONTINUED | OUTPATIENT
Start: 2018-09-10 | End: 2018-09-11 | Stop reason: HOSPADM

## 2018-09-10 RX ORDER — TRAMADOL HYDROCHLORIDE 50 MG/1
50 TABLET ORAL
Status: DISCONTINUED | OUTPATIENT
Start: 2018-09-10 | End: 2018-09-11 | Stop reason: HOSPADM

## 2018-09-10 RX ADMIN — Medication 10 ML: at 21:33

## 2018-09-10 RX ADMIN — POLYETHYLENE GLYCOL 3350 17 G: 17 POWDER, FOR SOLUTION ORAL at 14:57

## 2018-09-10 RX ADMIN — OXYCODONE HYDROCHLORIDE AND ACETAMINOPHEN 500 MG: 500 TABLET ORAL at 08:07

## 2018-09-10 RX ADMIN — FUROSEMIDE 80 MG: 20 TABLET ORAL at 16:29

## 2018-09-10 RX ADMIN — CARVEDILOL 6.25 MG: 6.25 TABLET, FILM COATED ORAL at 08:09

## 2018-09-10 RX ADMIN — CARVEDILOL 6.25 MG: 6.25 TABLET, FILM COATED ORAL at 16:29

## 2018-09-10 RX ADMIN — ALBUTEROL SULFATE 2.5 MG: 2.5 SOLUTION RESPIRATORY (INHALATION) at 07:47

## 2018-09-10 RX ADMIN — Medication 10 ML: at 05:46

## 2018-09-10 RX ADMIN — TIOTROPIUM BROMIDE 18 MCG: 18 CAPSULE ORAL; RESPIRATORY (INHALATION) at 07:47

## 2018-09-10 RX ADMIN — ALBUTEROL SULFATE 2.5 MG: 2.5 SOLUTION RESPIRATORY (INHALATION) at 13:30

## 2018-09-10 RX ADMIN — TRAMADOL HYDROCHLORIDE 50 MG: 50 TABLET, FILM COATED ORAL at 17:53

## 2018-09-10 RX ADMIN — ASPIRIN 81 MG: 81 TABLET, COATED ORAL at 08:09

## 2018-09-10 RX ADMIN — ALBUTEROL SULFATE 2.5 MG: 2.5 SOLUTION RESPIRATORY (INHALATION) at 19:14

## 2018-09-10 RX ADMIN — TRAMADOL HYDROCHLORIDE 50 MG: 50 TABLET, FILM COATED ORAL at 13:50

## 2018-09-10 RX ADMIN — Medication 10 ML: at 13:37

## 2018-09-10 RX ADMIN — ISOSORBIDE MONONITRATE 30 MG: 30 TABLET, EXTENDED RELEASE ORAL at 08:09

## 2018-09-10 RX ADMIN — BUDESONIDE 500 MCG: 0.5 INHALANT RESPIRATORY (INHALATION) at 07:47

## 2018-09-10 RX ADMIN — SIMVASTATIN 20 MG: 20 TABLET, FILM COATED ORAL at 21:32

## 2018-09-10 RX ADMIN — ESCITALOPRAM OXALATE 20 MG: 10 TABLET ORAL at 08:07

## 2018-09-10 RX ADMIN — INSULIN LISPRO 2 UNITS: 100 INJECTION, SOLUTION INTRAVENOUS; SUBCUTANEOUS at 17:55

## 2018-09-10 RX ADMIN — BUDESONIDE 500 MCG: 0.5 INHALANT RESPIRATORY (INHALATION) at 19:14

## 2018-09-10 RX ADMIN — INSULIN LISPRO 2 UNITS: 100 INJECTION, SOLUTION INTRAVENOUS; SUBCUTANEOUS at 11:32

## 2018-09-10 RX ADMIN — FUROSEMIDE 80 MG: 20 TABLET ORAL at 08:07

## 2018-09-10 NOTE — PROGRESS NOTES
Warfarin dosing per pharmacist 
 
Lissette Fragoso Fozia Hand is a 77 y.o. female. Height: 5' 2\" (157.5 cm)    Weight: 100.1 kg (220 lb 9.6 oz) Indication:  S/p AVR Goal INR:  2-3 Home dose:  5 mg Sun and 2.5 mg ROW (Weekly dose of 20 mg) Risk factors or significant drug interactions:  none Other anticoagulants:  none Daily Monitoring Date  INR     Warfarin dose HGB              Notes 9/8  3.4  Hold  9.3 
9/9  2.8  Hold  9.0  
9/10  2.0  Hold  9.6 Pharmacy has been consulted to dose warfarin for this patient due to a history of AVR (mechanical). The INR goal per the patient's home warfarin clinic is 2-3. Patient initially presented with a supratherapeutic INR that has trended down. INR to 2.0. Per surgery, will likely debride left knee bullae/epidermolysis at bedside soon when INR a little lower. Continue to hold warfarin for procedure. Thank you, Mell Hyatt, Pharm. D. Clinical Pharmacist 
[phone]

## 2018-09-10 NOTE — PROGRESS NOTES
Patient is in good spirits Sitting in chair with loving family present Encouraged with presence and assurance of prayers They were thankful Stephanie Mobley, staff [phone] [facility]  /   [email or web address]

## 2018-09-10 NOTE — PROGRESS NOTES
Pt resting in bed. VSS on 3L NC. Prn Tramadol given 2x this shift for L knee pain at hematoma site, with good relief. Fair appetite. Prn Miralax given for constipation; pt had bowel movement this shift. Pt resting in recliner, no needs voiced at this time. Shift report given to oncoming nurse at the bedside.

## 2018-09-10 NOTE — PROGRESS NOTES
Problem: Falls - Risk of 
Goal: *Absence of Falls Document Dali Jiménez Fall Risk and appropriate interventions in the flowsheet. Outcome: Progressing Towards Goal 
Fall Risk Interventions: 
Mobility Interventions: Communicate number of staff needed for ambulation/transfer, Patient to call before getting OOB Medication Interventions: Teach patient to arise slowly, Patient to call before getting OOB Elimination Interventions: Call light in reach, Patient to call for help with toileting needs, Toileting schedule/hourly rounds History of Falls Interventions: Investigate reason for fall Problem: Pressure Injury - Risk of 
Goal: *Prevention of pressure injury Document Bahman Scale and appropriate interventions in the flowsheet. Outcome: Progressing Towards Goal 
Pressure Injury Interventions: 
  
 
Moisture Interventions: Apply protective barrier, creams and emollients, Check for incontinence Q2 hours and as needed Activity Interventions: Increase time out of bed, Pressure redistribution bed/mattress(bed type) Mobility Interventions: Pressure redistribution bed/mattress (bed type), PT/OT evaluation Nutrition Interventions: Document food/fluid/supplement intake, Offer support with meals,snacks and hydration Friction and Shear Interventions: HOB 30 degrees or less

## 2018-09-10 NOTE — PROGRESS NOTES
H&P/Consult Note/Progress Note/Office Note:  
Zenia Denver  MRN: [id number]  NHV:[birth year]  Age:68 y.o. General Surgery Consult ordered by: Dr. Deborah Cuenca Reason for Consult:Eval left knee hematoma HPI: Zenia Denver is a 77 y.o. female with PMH of probable MDS followed by hematology Dr. Cathi Grey, CKD 3, s/dCHF with ICD - EF 20-25%, chronic hypoxic respiratory failure on 3 L home O2, nocturnal trilogy use for REJI, DM2, HTN, and mechanical AVR on coumadin admitted by Hospitalist with acute on chronic anemia. She typically has monthly procrit injections and intermittent PRBC transfusions for worsening anemia. Had Bone marrow biopsy that was nondiagnostic however unable to rule out lower grade MDS. At the time of admission HGB 6.5 and 2 units PRBC given. Pt reports falling in the bathroom last Thursday with subsequent bruising and blisters x 2 to Left lower extremity. 9/7/18 Xray Left Knee Hx: Pain after falling Three views of the left knee were obtained 
  
FINDINGS:  There is pretibial soft tissue edema and swelling. There is no 
evidence of fracture or other acute bony abnormality. No bony lesions are seen. There is no joint effusion. 
  
IMPRESSION: No evidence of fracture 9/7/18: Head CT Hx:  Reason fall, increased confusion 
  
Multiple axial images obtained through the brain without intravenous contrast.  
Radiation dose reduction techniques were used for this study: All CT scans 
performed at this facility use one or all of the following: Automated exposure 
control, adjustment of the mA and/or kVp according to patient's size, iterative 
reconstruction. Compared with 04/28/2018. 
  
FINDINGS: There is stable bioccipital atrophy. There is also mild chronic 
change in the white matter. There is no CT evidence of acute hemorrhage or 
infarction. The ventricles are normal in size. There are no extra-axial fluid 
collections. No masses are seen. The sinuses are clear.  There are no bony lesions. 
  
IMPRESSION: No CT evidence of acute intracranial abnormality. 18: Resting in bed. C/o pain to Left knee and lower extremity. H/H 9.3/ 29.5. Creat 1.75. INR 3.4. 
18 No changes; coumadin on hold INR coming down; fall precautions 9/10/18 no changes; INR 2.0 this am 
 
 
 
Past Medical History:  
Diagnosis Date  Anticoagulated on Coumadin 2013 S/P AVR  CAD (coronary artery disease) 2013 PCI LAD with stent placed  Cardiomy[facility]  CHF (congestive heart failure) ([facility] 2017  CKD (chronic kidney disease) stage 3, GFR 30-59 ml/min 7/10/2013  COPD (chronic obstructive pulmonary disease) ([facility] 2014  Essential hypertension, benign 2013  HLD (hyperlipidemia)  ICD (implantable cardioverter-defibrillator) in place 10/2/2014 Biotronik single-chamber ICD implantation 10/20/14  Iron deficiency anemia due to chronic blood loss 2009  MDS (myelodysplastic syndrome) ([facility] 2011 Procrit started in 11 Procrit weekly and Iron stores. 12 good response to 3 weekly procrit did not need it last time  3-7-13 Pt doing well. Just wanted a \"check-up. \" Responding to Procrit every three weeks. 13 patient has missed some injections on recently restarted hemoglobin only issue , takes oral iron iron stores each time  REJI (obstructive sleep apnea)-cpap 2014  Osteoarthritis  Osteopenia  Quadrantanopia  Visual complaint 2015 Past Surgical History:  
Procedure Laterality Date [address]    HX AORTIC VALVE REPLACEMENT  ,   
 mechanical valve   HX  SECTION    
 HX CORONARY STENT PLACEMENT  May, 2014 STEMI with Stent placement.  HX ENDOSCOPY  2009 EGD [address] CATHETERIZATION    HX PACEMAKER  10/2/2014 Biotronik ICD  HX TUBAL LIGATION    
 IR BX BONE MARROW DIAGNOSTIC  2011 Current Facility-Administered Medications Medication Dose Route Frequency  traMADol (ULTRAM) tablet 50 mg  50 mg Oral Q6H PRN  
 tuberculin injection 5 Units  5 Units IntraDERMal ONCE  warfarin (COUMADIN) tablet 2 mg  2 mg Oral QPM  
 morphine injection 2 mg  2 mg IntraVENous Q4H PRN  
 aspirin delayed-release tablet 81 mg  81 mg Oral DAILY  0.9% sodium chloride infusion 250 mL  250 mL IntraVENous PRN  
 albuterol (PROVENTIL VENTOLIN) nebulizer solution 2.5 mg  2.5 mg Nebulization Q4H PRN  
 ascorbic acid (vitamin C) (VITAMIN C) tablet 500 mg  500 mg Oral DAILY  carvedilol (COREG) tablet 6.25 mg  6.25 mg Oral BID WITH MEALS  escitalopram oxalate (LEXAPRO) tablet 20 mg  20 mg Oral DAILY  furosemide (LASIX) tablet 80 mg  80 mg Oral BID  isosorbide mononitrate ER (IMDUR) tablet 30 mg  30 mg Oral DAILY  simvastatin (ZOCOR) tablet 20 mg  20 mg Oral QHS  tiotropium (SPIRIVA) inhalation capsule 18 mcg  18 mcg Inhalation DAILY  sodium chloride (NS) flush 5-10 mL  5-10 mL IntraVENous Q8H  
 sodium chloride (NS) flush 5-10 mL  5-10 mL IntraVENous PRN  
 acetaminophen (TYLENOL) tablet 650 mg  650 mg Oral Q6H PRN  
 ondansetron (ZOFRAN) injection 4 mg  4 mg IntraVENous Q4H PRN  
 insulin lispro (HUMALOG) injection   SubCUTAneous AC&HS  influenza vaccine 2018-19 (6 mos+)(PF) (FLUARIX QUAD/FLULAVAL QUAD) injection 0.5 mL  0.5 mL IntraMUSCular PRIOR TO DISCHARGE  budesonide (PULMICORT) 500 mcg/2 ml nebulizer suspension  500 mcg Nebulization BID RT And  
 albuterol (PROVENTIL VENTOLIN) nebulizer solution 2.5 mg  2.5 mg Nebulization Q6HWA RT  
 
Sulfa (sulfonamide antibiotics) and Aspirin Social History Social History  Marital status:  Spouse name: N/A  
 Number of children: N/A  
 Years of education: N/A Occupational History   Retired  
  [employer] Social History Main Topics  Smoking status: Former Smoker Packs/day: 0.25   Years: 42.00  
 Types: Cigarettes Start date: 10/1/1956 Quit date: 5/1/2014  Smokeless tobacco: Never Used  Alcohol use No  
 Drug use: No  
 Sexual activity: Not Asked Other Topics Concern  Weight Concern Yes  Special Diet Yes Social History Narrative Lives with her daughter. Ambulates only short distances. History Smoking Status  Former Smoker  Packs/day: 0.25  
 Years: 42.00  Types: Cigarettes  Start date: 10/1/1956  Quit date: 5/1/2014 Smokeless Tobacco  
 Never Used Family History Problem Relation Age of Onset  Heart Disease Mother CHF  Hypertension Mother  Kidney Disease Mother  Heart Disease Father CHF  Lung Disease Father  Diabetes Father  Cancer Father   
  prostate  Hypertension Father  Heart Attack Father  Heart Disease Maternal Aunt  Diabetes Brother  Coronary Artery Disease Other  Breast Cancer Neg Hx   
 
ROS:  Comprehensive review of systems was otherwise unremarkable except as noted above. Physical Exam:  
Visit Vitals  /59 (BP 1 Location: Right arm, BP Patient Position: At rest;Supine)  Pulse 78  Temp 98.7 °F (37.1 °C)  Resp 20  
 Ht 5' 2\" (1.575 m)  Wt 220 lb 9.6 oz (100.1 kg)  SpO2 92%  BMI 40.35 kg/m2 Vitals:  
 09/09/18 2303 09/10/18 0301 09/10/18 9374 09/10/18 9469 BP: 133/77 124/71 129/59 Pulse: 68 74 78 Resp: 18 18 20 Temp: 97.9 °F (36.6 °C) 97.5 °F (36.4 °C) 98.7 °F (37.1 °C) SpO2: 93% 90% 97% 92% Weight:      
Height:      
 
09/10 0701 - 09/10 1900 In: 360 [P.O.:360] Out: -  
09/08 1901 - 09/10 0700 In: 7629 [P.O.:920; I.V.:843] Out: 4775 [[id number]] Constitutional: Alert, cooperative, no acute distress; appears stated age Eyes:Sclera are clear. EOMs intact ENMT: no external lesions gross hearing normal; no obvious neck masses, no ear or lip lesions, nares normal 
CV: RRR. Normal perfusion, Mild edema Resp: No JVD. Breathing is  non-labored; no audible wheezing. GI: soft, non-tender, non-distended Integument: diffuse bruising and fluid filled blister/epidermolysis x 2 to Left anterior shin/knee No cellulitis Musculoskeletal: unremarkable with normal function. No embolic signs or cyanosis. Neuro:  Oriented; moves all 4; no focal deficits Psychiatric: normal affect and mood, no memory impairment Recent vitals (if inpt): 
Patient Vitals for the past 24 hrs: 
 BP Temp Pulse Resp SpO2 Weight  
09/10/18 0748 - - - - 92 % -  
09/10/18 0724 129/59 98.7 °F (37.1 °C) 78 20 97 % -  
09/10/18 0301 124/71 97.5 °F (36.4 °C) 74 18 90 % -  
09/09/18 2303 133/77 97.9 °F (36.6 °C) 68 18 93 % -  
09/09/18 2235 - - - - 96 % -  
09/09/18 2105 - - - - 97 % -  
09/09/18 1930 129/66 98.4 °F (36.9 °C) 67 18 94 % 220 lb 9.6 oz (100.1 kg) 09/09/18 1514 137/68 98.9 °F (37.2 °C) 70 18 94 % -  
09/09/18 1333 - - - - 96 % -  
09/09/18 1102 120/65 98.2 °F (36.8 °C) 68 18 96 % - Labs: 
Recent Labs  
   09/10/18 
 0447   09/07/18 
 1752 WBC  8.2   < >  6.9 HGB  9.6*   < >  6.5* PLT  213   < >  186 NA  140   < >  136  
K  4.6   < >  5.2*  
CL  91*   < >  94* CO2  42*   < >  40* BUN  55*   < >  68* CREA  1.55*   < >  2.19* GLU  100   < >  139* PTP  21.5*   < >  39.5* INR  2.0   < >  4.3* TBILI   --    --   0.8 SGOT   --    --   11* ALT   --    --   17  
AP   --    --   48*  
 < > = values in this interval not displayed. Lab Results Component Value Date/Time  WBC 8.2 09/10/2018 04:47 AM  
 HGB 9.6 (L) 09/10/2018 04:47 AM  
 PLATELET 498 95/43/7655 04:47 AM  
 Sodium 140 09/10/2018 04:47 AM  
 Potassium 4.6 09/10/2018 04:47 AM  
 Chloride 91 (L) 09/10/2018 04:47 AM  
 CO2 42 (HH) 09/10/2018 04:47 AM  
 BUN 55 (H) 09/10/2018 04:47 AM  
 Creatinine 1.55 (H) 09/10/2018 04:47 AM  
 Glucose 100 09/10/2018 04:47 AM  
 INR 2.0 09/10/2018 04:47 AM  
 aPTT 33.8 (H) 12/01/2016 02:20 AM  
 Bilirubin, total 0.8 09/07/2018 05:52 PM  
 AST (SGOT) 11 (L) 09/07/2018 05:52 PM  
 ALT (SGPT) 17 09/07/2018 05:52 PM  
 Alk. phosphatase 48 (L) 09/07/2018 05:52 PM  
 Amylase 88 01/31/2012 03:12 PM  
 Lipase 96 04/14/2014 02:40 PM  
 Lactic acid 0.7 02/16/2016 06:16 AM  
 Troponin-I <0.05 01/31/2012 03:32 PM  
 Troponin-I, Qt. 0.08 (H) 12/01/2016 02:20 AM  
 
 
 
I reviewed recent labs and recent radiologic studies. I independently reviewed radiology images for studies I described above or studies I have ordered. Admission date (for inpatients): 9/7/2018 * No surgery found *  * No surgery found * ASSESSMENT/PLAN: 
Problem List  Date Reviewed: 8/31/2018 Codes Class Noted Coagulopathy ([facility] INR >4 on admission 9/7/18 ICD-10-CM: D68.9 ICD-9-CM: 286.9  9/8/2018 Traumatic hematoma of left knee ICD-10-CM: V81.29VF ICD-9-CM: 924.11  9/8/2018 Debility ICD-10-CM: R53.81 ICD-9-CM: 799.3  9/8/2018 * (Principal)Anemia ICD-10-CM: D64.9 ICD-9-CM: 285.9  9/7/2018 Chronic respiratory failure with hypoxia (HCC) (Chronic) ICD-10-CM: J96.11 
ICD-9-CM: 518.83, 799.02  9/7/2018 Acute kidney injury superimposed on chronic kidney disease ([facility] ICD-10-CM: N17.9, N18.9 ICD-9-CM: 866.00, 585.9  9/7/2018 Encounter for immunization ICD-10-CM: T97 ICD-9-CM: V03.89  8/21/2018 Obesity, morbid ([facility] ICD-10-CM: E66.01 
ICD-9-CM: 278.01  6/8/2018 Physical debility ICD-10-CM: R53.81 ICD-9-CM: 799.3  5/2/2018 Closed nondisplaced fracture of shaft of fifth metacarpal bone of left hand ICD-10-CM: J01.810V ICD-9-CM: 815.03  4/28/2018 Subdural hematoma (HCC) ICD-10-CM: I62.00 ICD-9-CM: 432.1  4/27/2018 Long term (current) use of anticoagulants (Chronic) ICD-10-CM: Z79.01 
ICD-9-CM: V58.61  4/19/2018 Dysthymia ICD-10-CM: F34.1 ICD-9-CM: 300.4  4/5/2018  Type 2 diabetes mellitus with nephropathy (HCC) (Chronic) ICD-10-CM: E11.21 
 ICD-9-CM: 250.40, 583.81  12/18/2017 Pulmonary hypertension (HCC) (Chronic) ICD-10-CM: I27.20 ICD-9-CM: 416.8  6/15/2016 S/P AVR (aortic valve replacement) (Chronic) ICD-10-CM: Z95.2 ICD-9-CM: V43.3  Unknown Overview Signed 2/23/2016 11:04 AM by Jennifer Hernandez Mechanical/Artific Cardiomyopathy ([facility] (Chronic) ICD-10-CM: I42.9 ICD-9-CM: 425.4  Unknown Osteopenia ICD-10-CM: M85.80 ICD-9-CM: 733.90  Unknown HLD (hyperlipidemia) (Chronic) ICD-10-CM: D19.1 ICD-9-CM: 272.4  Unknown Osteoarthritis ICD-10-CM: M19.90 ICD-9-CM: 715.90  Unknown  
   
 ICD (implantable cardioverter-defibrillator) in place ICD-10-CM: Z95.810 ICD-9-CM: V45.02  10/2/2014 Overview Signed 10/2/2014  4:58 PM by Melanie Crisostomo III Biotronik single-chamber ICD implantation 10/20/14 Diabetes mellitus type 2, diet-controlled (HCC) (Chronic) ICD-10-CM: E11.9 ICD-9-CM: 250.00  8/28/2014 COPD (chronic obstructive pulmonary disease) (HCC) (Chronic) ICD-10-CM: J44.9 ICD-9-CM: 358  4/2/2014 REJI (obstructive sleep apnea)-cpap (Chronic) ICD-10-CM: G47.33 
ICD-9-CM: 327.23  4/2/2014 CKD (chronic kidney disease) stage 3, GFR 30-59 ml/min (Chronic) ICD-10-CM: N18.3 ICD-9-CM: 585.3  7/10/2013 Anticoagulated on Coumadin (Chronic) ICD-10-CM: Z51.81, Z79.01 
ICD-9-CM: V58.83, V58.61  7/9/2013 Overview Signed 7/9/2013  4:16 PM by Rishi Lorenzana NP  
  S/P AVR 
  
  
   
 CAD (coronary artery disease) (Chronic) ICD-10-CM: I25.10 ICD-9-CM: 414.00  1/20/2013 Overview Signed 5/20/2014 10:05 AM by Janiya Shields NP  
  5/8/14 PCI LAD with stent placed Chronic combined systolic and diastolic heart failure (HCC) (Chronic) ICD-10-CM: I50.42 
ICD-9-CM: 428.42  1/20/2013 Overview Addendum 4/28/2018  4:53 AM by Geovanni Aden MD  
  5/8/14 ECHO:  EF 10-15% 12/2017:  EF 25-30% Essential hypertension, benign (Chronic) ICD-10-CM: I10 
ICD-9-CM: 401.1  1/20/2013 MDS (myelodysplastic syndrome) (HCC) (Chronic) ICD-10-CM: D46.9 ICD-9-CM: 238.75  12/17/2011 Overview Addendum 8/29/2013 11:06 AM by Ray Bennett Procrit started in August, 2011 12/18/11 Procrit weekly and Iron stores. 5-12 12-13-12 good response to 3 weekly procrit did not need it last time 3-7-13 Pt doing well. Just wanted a \"check-up. \" Responding to Procrit every three weeks. 8-29-13 patient has missed some injections on recently restarted hemoglobin only issue , takes oral iron iron stores each time Iron deficiency anemia due to chronic blood loss (Chronic) ICD-10-CM: D50.0 ICD-9-CM: 280.0  7/29/2009 Principal Problem: Anemia (9/7/2018) Active Problems: 
  MDS (myelodysplastic syndrome) ([facility] (12/17/2011) Overview: Procrit started in August, 2011 12/18/11 Procrit weekly and Iron stores. 5-12 12-13-12 good response to 3 weekly procrit did not need it last time 3-7-13 Pt doing well. Just wanted a \"check-up. \" Responding to Procrit  
    every three weeks. 8-29-13 patient has missed some injections on recently restarted  
    hemoglobin only issue , takes oral iron iron stores each time CAD (coronary artery disease) (1/20/2013) Overview: 5/8/14 PCI LAD with stent placed Chronic combined systolic and diastolic heart failure ([facility] (1/20/2013) Overview: 5/8/14 ECHO:  EF 10-15% 12/2017:  EF 25-30% Essential hypertension, benign (1/20/2013) Anticoagulated on Coumadin (7/9/2013) Overview: S/P AVR 
 
  CKD (chronic kidney disease) stage 3, GFR 30-59 ml/min (7/10/2013) COPD (chronic obstructive pulmonary disease) ([facility] (4/2/2014) REJI (obstructive sleep apnea)-cpap (4/2/2014) Diabetes mellitus type 2, diet-controlled ([facility] (8/28/2014) ICD (implantable cardioverter-defibrillator) in place (10/2/2014) Overview: Biotronik single-chamber ICD implantation 10/20/14 S/P AVR (aortic valve replacement) () Overview: Mechanical/Artific Obesity, morbid ([facility] (6/8/2018) Chronic respiratory failure with hypoxia ([address] (9/7/2018) Acute kidney injury superimposed on chronic kidney disease ([address] (9/7/2018) Coagulopathy ([address] INR >4 on admission 9/7/18 (9/8/2018) Traumatic hematoma of left knee (9/8/2018) Debility (9/8/2018) Plan: 
INR 3.4-->2.8-->2.0, Pharmacy consulted for dosing with goal INR 2.5 to 3.5, followup daily INR Will follow left knee injury No cellulitis at present We will likely debride left knee bullae/epidermolysis at bedside soon when INR a little lower Debility/Falls-->Recommend SNF/rehab after hospitalization Jaylan Ortiz MD, FACS

## 2018-09-10 NOTE — PROGRESS NOTES
Hospitalist Progress Note Admit Date:  2018  5:55 PM  
Name:  Anuj Arredondo Age:  77 y.o. 
:  1950 MRN:  [id number] PCP:  Joselito Allen MD 
Treatment Team: Attending Provider: Sasha Perrin MD; Consulting Provider: Rosie Arriola NP; Surgeon: Sarah Sandhu MD; Consulting Provider: Bhakti Smith; Care Manager: Karina Dave Subjective: The patient is a 76 y/o lady with possible MDS, HTN, DM 2, CAD s/p stent, Chronic systolic CHF s/p AICD, REJI on Trilogy at night, Mechanical Aortic valve on Coumadin, Chronic Hypoxemic Respiratory Failure on home O2, was admitted for Acute on Chronic Anemia, FELIZ on CKD stage 3 and Supratherapeutic INR. She c/o pain on the left knee where a big hematoma is visible. Hb improved to 9.6 after 2 PRBCs, INR is down to 2.0. Other than knee pain, the patient feels well and denies any SOB. She was seen by Physical Therapy and will likely be discharged tomorrow to SNF for short term rehab. Objective:  
Patient Vitals for the past 24 hrs: 
 Temp Pulse Resp BP SpO2  
09/10/18 1330 - - - - 95 % 09/10/18 1110 98.5 °F (36.9 °C) 76 18 122/70 99 % 09/10/18 0748 - - - - 92 % 09/10/18 0724 98.7 °F (37.1 °C) 78 20 129/59 97 % 09/10/18 0301 97.5 °F (36.4 °C) 74 18 124/71 90 % 18 2303 97.9 °F (36.6 °C) 68 18 133/77 93 % 18 2235 - - - - 96 % 18 2105 - - - - 97 % 18 1930 98.4 °F (36.9 °C) 67 18 129/66 94 % Oxygen Therapy O2 Sat (%): 95 % (09/10/18 1330) Pulse via Oximetry: 78 beats per minute (09/10/18 1330) O2 Device: Nasal cannula (09/10/18 1330) O2 Flow Rate (L/min): 3 l/min (09/10/18 1330) FIO2 (%): 32 % (18 2105) Intake/Output Summary (Last 24 hours) at 09/10/18 1520 Last data filed at 09/10/18 1223 Gross per 24 hour Intake              720 ml Output             2575 ml Net            -1855 ml General:    Well nourished. Alert. CV: RRR. No murmur, rub, or gallop. Lungs:   Clear to auscultation bilaterally. No wheezing, rhonchi, or rales. Abdomen:   Soft, nontender, nondistended. Hematoma visible on the left knee, tender on palpation. Extremities: Warm and dry. No cyanosis or edema. Skin:     No rashes or jaundice, except for ecchymoses and hematoma on the LLE Data Review: 
I have reviewed all labs, meds, telemetry events, and studies from the last 24 hours. Recent Results (from the past 24 hour(s)) GLUCOSE, POC Collection Time: 09/09/18  4:06 PM  
Result Value Ref Range Glucose (POC) 97 65 - 100 mg/dL GLUCOSE, POC Collection Time: 09/09/18  8:36 PM  
Result Value Ref Range Glucose (POC) 190 (H) 65 - 100 mg/dL PROTHROMBIN TIME + INR Collection Time: 09/10/18  4:47 AM  
Result Value Ref Range Prothrombin time 21.5 (H) 11.5 - 14.5 sec INR 2.0    
CBC WITH AUTOMATED DIFF Collection Time: 09/10/18  4:47 AM  
Result Value Ref Range WBC 8.2 4.3 - 11.1 K/uL  
 RBC 3.26 (L) 4.05 - 5.2 M/uL HGB 9.6 (L) 11.7 - 15.4 g/dL HCT 30.7 (L) 35.8 - 46.3 % MCV 94.2 79.6 - 97.8 FL  
 MCH 29.4 26.1 - 32.9 PG  
 MCHC 31.3 (L) 31.4 - 35.0 g/dL  
 RDW 15.4 % PLATELET 677 154 - 037 K/uL MPV 10.3 9.4 - 12.3 FL ABSOLUTE NRBC 0.00 0.0 - 0.2 K/uL  
 DF AUTOMATED NEUTROPHILS 68 43 - 78 % LYMPHOCYTES 20 13 - 44 % MONOCYTES 9 4.0 - 12.0 % EOSINOPHILS 2 0.5 - 7.8 % BASOPHILS 0 0.0 - 2.0 % IMMATURE GRANULOCYTES 0 0.0 - 5.0 %  
 ABS. NEUTROPHILS 5.5 1.7 - 8.2 K/UL  
 ABS. LYMPHOCYTES 1.7 0.5 - 4.6 K/UL  
 ABS. MONOCYTES 0.8 0.1 - 1.3 K/UL  
 ABS. EOSINOPHILS 0.2 0.0 - 0.8 K/UL  
 ABS. BASOPHILS 0.0 0.0 - 0.2 K/UL  
 ABS. IMM. GRANS. 0.0 0.0 - 0.5 K/UL METABOLIC PANEL, BASIC Collection Time: 09/10/18  4:47 AM  
Result Value Ref Range Sodium 140 136 - 145 mmol/L Potassium 4.6 3.5 - 5.1 mmol/L  Chloride 91 (L) 98 - 107 mmol/L  
 CO2 42 (HH) 21 - 32 mmol/L  
 Anion gap 7 7 - 16 mmol/L Glucose 100 65 - 100 mg/dL BUN 55 (H) 8 - 23 MG/DL Creatinine 1.55 (H) 0.6 - 1.0 MG/DL  
 GFR est AA 43 (L) >60 ml/min/1.73m2 GFR est non-AA 35 (L) >60 ml/min/1.73m2 Calcium 9.8 8.3 - 10.4 MG/DL  
GLUCOSE, POC Collection Time: 09/10/18  7:29 AM  
Result Value Ref Range Glucose (POC) 108 (H) 65 - 100 mg/dL PLEASE READ & DOCUMENT PPD TEST IN 24 HRS Collection Time: 09/10/18  8:16 AM  
Result Value Ref Range PPD Negative Negative  
 mm Induration 0 mm GLUCOSE, POC Collection Time: 09/10/18 11:13 AM  
Result Value Ref Range Glucose (POC) 153 (H) 65 - 100 mg/dL All Micro Results Procedure Component Value Units Date/Time INFLUENZA A & B AG (RAPID TEST) [[id number]] Collected:  09/07/18 2139 Order Status:  Completed Specimen:  Nasopharyngeal from Nasal washing Updated:  09/07/18 2205 Influenza A Ag NEGATIVE      
   NEGATIVE FOR THE PRESENCE OF INFLUENZA A ANTIGEN 
INFECTION DUE TO INFLUENZA A CANNOT BE RULED OUT. BECAUSE THE ANTIGEN PRESENT IN THE SAMPLE MAY BE BELOW 
THE DETECTION LIMIT OF THE TEST. A NEGATIVE TEST IS PRESUMPTIVE AND IT IS RECOMMENDED THAT THESE RESULTS BE CONFIRMED BY VIRAL CULTURE OR AN FDA-CLEARED INFLUENZA A AND B MOLECULAR ASSAY. Influenza B Ag NEGATIVE      
   NEGATIVE FOR THE PRESENCE OF INFLUENZA B ANTIGEN 
INFECTION DUE TO INFLUENZA B CANNOT BE RULED OUT. BECAUSE THE ANTIGEN PRESENT IN THE SAMPLE MAY BE BELOW 
THE DETECTION LIMIT OF THE TEST. A NEGATIVE TEST IS PRESUMPTIVE AND IT IS RECOMMENDED THAT THESE RESULTS BE CONFIRMED BY VIRAL CULTURE OR AN FDA-CLEARED INFLUENZA A AND B MOLECULAR ASSAY. Source NASOPHARYNGEAL Current Meds: 
Current Facility-Administered Medications Medication Dose Route Frequency  warfarin (COUMADIN) tablet 2.5 mg - On Hold  2.5 mg Oral See Admin Instructions  polyethylene glycol (MIRALAX) packet 17 g  17 g Oral DAILY PRN  
  traMADol (ULTRAM) tablet 50 mg  50 mg Oral Q6H PRN  
 morphine injection 2 mg  2 mg IntraVENous Q4H PRN  
 aspirin delayed-release tablet 81 mg  81 mg Oral DAILY  0.9% sodium chloride infusion 250 mL  250 mL IntraVENous PRN  
 albuterol (PROVENTIL VENTOLIN) nebulizer solution 2.5 mg  2.5 mg Nebulization Q4H PRN  
 ascorbic acid (vitamin C) (VITAMIN C) tablet 500 mg  500 mg Oral DAILY  carvedilol (COREG) tablet 6.25 mg  6.25 mg Oral BID WITH MEALS  escitalopram oxalate (LEXAPRO) tablet 20 mg  20 mg Oral DAILY  furosemide (LASIX) tablet 80 mg  80 mg Oral BID  isosorbide mononitrate ER (IMDUR) tablet 30 mg  30 mg Oral DAILY  simvastatin (ZOCOR) tablet 20 mg  20 mg Oral QHS  tiotropium (SPIRIVA) inhalation capsule 18 mcg  18 mcg Inhalation DAILY  sodium chloride (NS) flush 5-10 mL  5-10 mL IntraVENous Q8H  
 sodium chloride (NS) flush 5-10 mL  5-10 mL IntraVENous PRN  
 acetaminophen (TYLENOL) tablet 650 mg  650 mg Oral Q6H PRN  
 ondansetron (ZOFRAN) injection 4 mg  4 mg IntraVENous Q4H PRN  
 insulin lispro (HUMALOG) injection   SubCUTAneous AC&HS  influenza vaccine 2018-19 (6 mos+)(PF) (FLUARIX QUAD/FLULAVAL QUAD) injection 0.5 mL  0.5 mL IntraMUSCular PRIOR TO DISCHARGE  budesonide (PULMICORT) 500 mcg/2 ml nebulizer suspension  500 mcg Nebulization BID RT And  
 albuterol (PROVENTIL VENTOLIN) nebulizer solution 2.5 mg  2.5 mg Nebulization Q6HWA RT Other Studies (last 24 hours): No results found. Assessment and Plan:  
 
Hospital Problems as of 9/10/2018  Date Reviewed: 8/31/2018 Codes Class Noted - Resolved POA Coagulopathy ([facility] [address] INR >4 on admission 9/7/18 ICD-10-CM: D68.9 ICD-9-CM: 286.9  9/8/2018 - Present Yes Traumatic hematoma of left knee ICD-10-CM: C10.18KG ICD-9-CM: 924.11  9/8/2018 - Present Yes Debility ICD-10-CM: R53.81 ICD-9-CM: 799.3  9/8/2018 - Present Yes * (Principal)Anemia ICD-10-CM: D64.9 ICD-9-CM: 285.9  9/7/2018 - Present Yes Chronic respiratory failure with hypoxia (HCC) (Chronic) ICD-10-CM: J96.11 
ICD-9-CM: 518.83, 799.02  9/7/2018 - Present Yes Acute kidney injury superimposed on chronic kidney disease ([address] ICD-10-CM: N17.9, N18.9 ICD-9-CM: 866.00, 585.9  9/7/2018 - Present Yes Obesity, morbid ([address] ICD-10-CM: E66.01 
ICD-9-CM: 278.01  6/8/2018 - Present Yes S/P AVR (aortic valve replacement) (Chronic) ICD-10-CM: Z95.2 ICD-9-CM: V43.3  Unknown - Present Yes Overview Signed 2/23/2016 11:04 AM by Roz Resendiz [employer] 
  
  
   
 ICD (implantable cardioverter-defibrillator) in place ICD-10-CM: Z95.810 ICD-9-CM: V45.02  10/2/2014 - Present Yes Overview Signed 10/2/2014  4:58 PM by Derek Gooden III [employer] single-chamber ICD implantation 10/20/14 Diabetes mellitus type 2, diet-controlled (HCC) (Chronic) ICD-10-CM: E11.9 ICD-9-CM: 250.00  8/28/2014 - Present Yes COPD (chronic obstructive pulmonary disease) (HCC) (Chronic) ICD-10-CM: J44.9 ICD-9-CM: 449  4/2/2014 - Present Yes  
   
 REJI (obstructive sleep apnea)-cpap (Chronic) ICD-10-CM: R03.87 
ICD-9-CM: 327.23  4/2/2014 - Present Yes  
   
 CKD (chronic kidney disease) stage 3, GFR 30-59 ml/min (Chronic) ICD-10-CM: N18.3 ICD-9-CM: 585.3  7/10/2013 - Present Yes Anticoagulated on Coumadin (Chronic) ICD-10-CM: Z51.81, Z79.01 
ICD-9-CM: V58.83, V58.61  7/9/2013 - Present Yes Overview Signed 7/9/2013  4:16 PM by Anny Rey NP  
  S/P AVR 
  
  
   
 CAD (coronary artery disease) (Chronic) ICD-10-CM: I25.10 ICD-9-CM: 414.00  1/20/2013 - Present Yes Overview Signed 5/20/2014 10:05 AM by Chapincito Ross NP  
  5/8/14 PCI LAD with stent placed Chronic combined systolic and diastolic heart failure (HCC) (Chronic) ICD-10-CM: I50.42 
ICD-9-CM: 428.42  1/20/2013 - Present Yes Overview Addendum 4/28/2018  4:53 AM by Sandra Deleon MD  
  5/8/14 ECHO:  EF 10-15% 12/2017:  EF 25-30% Essential hypertension, benign (Chronic) ICD-10-CM: I10 
ICD-9-CM: 401.1  1/20/2013 - Present Yes  
   
 MDS (myelodysplastic syndrome) (HCC) (Chronic) ICD-10-CM: D46.9 ICD-9-CM: 238.75  12/17/2011 - Present Yes Overview Addendum 8/29/2013 11:06 AM by Richard Yoder Procrit started in August, 2011 12/18/11 Procrit weekly and Iron stores. 5-12 12-13-12 good response to 3 weekly procrit did not need it last time 3-7-13 Pt doing well. Just wanted a \"check-up. \" Responding to Procrit every three weeks. 8-29-13 patient has missed some injections on recently restarted hemoglobin only issue , takes oral iron iron stores each time 1 - Acute on Chronic Anemia 2 - CKD stage 3, improved. FELIZ is actually resolved. Cr down from 2.19 to 1.55 (baseline 1.6-1.8). 3 - Supratherapeutic INR, resolved, last INR 2.0. Goal INR 2.0 - 3.0 
4 - Left Knee Hematoma 5 - Possible MDS 
6 - HTN 
7 - DM 2 
8 - CAD s/p stent 9 - Chronic Systolic CHF s/p AICD 
10 - Chronic hypoxemic respiratory failure on home O2/ REJI on Trilogy 11 - Mechanical Aortic Valve 
   
PLAN:   
· Hb 9.6 today · Continue to monitor Hb · Continue to hold Coumadin, as the patient will probably have drainage of her left knee hematoma tomorrow. Pharmacy assistance for dosing. Restart Coumadin after drainage of hematoma · Surgery consultation appreciated · Continue home medications for chronic conditions · The patient was seen by Physical Therapy and will likely be discharged tomorrow to SNF for Rehab 
   
DC planning/Dispo: SNF for Rehab in 1-2 days  
DVT ppx: the patient is on Coumadin (being held as she came with supratherapeutic INR)    
 
Signed: Alvaro Guerin MD

## 2018-09-10 NOTE — PROGRESS NOTES
Problem: Self Care Deficits Care Plan (Adult) Goal: *Acute Goals and Plan of Care (Insert Text) 1. Patient will perform bathing with moderate assistance within 7 days with equipment as needed. 2.  Patient will perform upper body dressing with minimal assistance within 7 days with equipment as needed. 3.  Patient will perform lower body dressing and toileting with moderate assistance within 7 days with equipment as needed. 4.   Patient will perform toilet transfer with supervision within 7 days with equipment as needed. 5.   Pt will participate in B UE therapeutic exercises for 20 minutes with 3 rest breaks within 7 days. OCCUPATIONAL THERAPY: Initial Assessment, Treatment Day: 1st, AM and PM 9/10/2018 INPATIENT: Hospital Day: 4 [place] / Product Type: Medicare /  
  
NAME/AGE/GENDER: Zenia Denver is a 77 y.o. female PRIMARY DIAGNOSIS:  Anemia Anemia Anemia ICD-10: Treatment Diagnosis:  
 · Pain in Right Shoulder (M25.511) · Stiffness of Right Shoulder, Not elsewhere classified (M25.611) · Generalized Muscle Weakness (M62.81) · Other lack of cordination (R27.8) · Repeated Falls (R29.6) · History of falling (Z91.81) Precautions/Allergies: 
  Falls, Sulfa (sulfonamide antibiotics) and Aspirin ASSESSMENT:  
 
Ms. Diaz admitted with above diagnosis. Patient fell approximately 2 months ago prior to first hospitalization injuring her R shoulder. Patient was discharged from inpatient rehab to home with an Aide assisting her 5 days/wk (part of day). Patient able to ambulate short distances in her apartment without assistive device, and she was able to microwave already prepared meals prior to admit. Patient required Assist with bathing and dressing at home prior to admit. Patient fell approximately 3 weeks ago getting out of shower with aide, injuring her L knee.   Hematoma present on L knee at this time. R UE AROM limited due to pain from a fall that occurred a couple of months ago. Patient required maximal assistance for supine to sit due to weakness and R shoulder & L knee pain. Patient stood from high surface with minimal assistance and transferred to [facility] with stand pivot transfer with minimal assistance. Patient required assist with 2 out of 3 parts of toileting. Patient very motivated with therapy tasks, and she stated that she wants to go to rehab. Patient to benefit from Occupational Therapy Services to maximize ADL performance. Cont OT per tx plan. This section established at most recent assessment PROBLEM LIST (Impairments causing functional limitations): 1. Decreased Strength 2. Decreased ADL/Functional Activities 3. Decreased Transfer Abilities 4. Decreased Ambulation Ability/Technique 5. Decreased Balance 6. Increased Pain 7. Decreased Activity Tolerance 8. Decreased Work Simplification/Energy Conservation Techniques 9. Increased Fatigue 10. Decreased Flexibility/Joint Mobility 11. Edema/Girth INTERVENTIONS PLANNED: (Benefits and precautions of occupational therapy have been discussed with the patient.) 1. Activities of daily living training 2. Adaptive equipment training 3. Balance training 4. Clothing management 5. Cognitive training 6. Donning&doffing training 7. Group therapy 8. Hygiene training 9. Meal preparation 10. Medication management training 11. Neuromuscular re-eduation 12. Re-evaluation 13. Sensory reintegration training 14. Therapeutic activity 15. Therapeutic exercise TREATMENT PLAN: Frequency/Duration: Follow patient 3x's/wk to address above goals. Rehabilitation Potential For Stated Goals: Good RECOMMENDED REHABILITATION/EQUIPMENT: (at time of discharge pending progress): Due to the probability of continued deficits (see above) this patient will likely need continued skilled occupational therapy after discharge. Equipment:  
? None at this time OCCUPATIONAL PROFILE AND HISTORY:  
History of Present Injury/Illness (Reason for Referral): Ms. Jane Denson is a 78 yo female with PMH of probable MDS followed by hematology Dr. Robert Edouard, CKD 3, s/dCHF with ICD - EF 20-25%, chronic hypoxic respiratory failure on 3 L home O2, nocturnal trilogy use for REJI, DM2, HTN, and mechanical AVR on coumadin evaluated with acute on chronic anemia. She typically has monthly procrit injections and intermittent PRBC transfusions for worsening anemia. Had Bone marrow biopsy that was nondiagnostic however unable to rule out lower grade MDS. She denies oly GI blood loss and I am not able to locate GI scope reports. Current HGB 6.5 and 2 units PRBC ordered per ED. She did sustain a fall in the bathroom several weeks ago with subsequent bruising and blisters to LE  
  
10 systems reviewed and negative except as noted in HPI. - has vision changes, chest pain and dyspnea, OA, easy bruising, mood and memory loss Past Medical History/Comorbidities: Ms. Jane Denson  has a past medical history of Anticoagulated on Coumadin (7/9/2013); CAD (coronary artery disease) (1/20/2013); Cardiomyopathy [facility]); CHF (congestive heart failure) ([facility] [address] (2/17/2017); CKD (chronic kidney disease) stage 3, GFR 30-59 ml/min (7/10/2013); COPD (chronic obstructive pulmonary disease) ([facility] [address] (4/2/2014); Essential hypertension, benign (1/20/2013); HLD (hyperlipidemia); ICD (implantable cardioverter-defibrillator) in place (10/2/2014); Iron deficiency anemia due to chronic blood loss (7/29/2009); MDS (myelodysplastic syndrome) ([facility] [address] (12/17/2011); REJI (obstructive sleep apnea)-cpap (4/2/2014); Osteoarthritis; Osteopenia; Quadrantanopia; and Visual complaint (12/14/2015). She also has no past medical history of Contact dermatitis and other eczema, due to unspecified cause; Difficult intubation; Malignant hyperthermia due to anesthesia;  Nausea & vomiting; Pseudocholinesterase deficiency; or Unspecified adverse effect of anesthesia. Ms. Grace Fraser  has a past surgical history that includes cardiac catheterization (); ir bx bone marrow diagnostic (2011); hx aortic valve replacement (, ); hx  section; hx tubal ligation; hx heart catheterization (); hx coronary stent placement (May, 2014); hx pacemaker (10/2/2014); and hx endoscopy (2009). Social History/Living Environment:  
Home Environment: Apartment # Steps to Enter: 3 One/Two Story Residence: One story Living Alone: Yes Support Systems: Family member(s) Patient Expects to be Discharged to[de-identified] Rehabilitation facility Current DME Used/Available at Home: Oxygen, portable, Shower chair, Raised toilet seat, Walker, rolling Tub or Shower Type: Tub/Shower combination Prior Level of Function/Work/Activity: 
Assist with bathing & dressing. Patient able to microwave already prepared meals with modified independence. Number of Personal Factors/Comorbidities that affect the Plan of Care: Expanded review of therapy/medical records (1-2):  MODERATE COMPLEXITY ASSESSMENT OF OCCUPATIONAL PERFORMANCE[de-identified]  
Activities of Daily Living:  
Basic ADLs (From Assessment) Complex ADLs (From Assessment) Feeding: Minimum assistance Oral Facial Hygiene/Grooming: Moderate assistance Bathing: Maximum assistance Upper Body Dressing: Maximum assistance Lower Body Dressing: Total assistance Toileting: Maximum assistance Instrumental ADL Meal Preparation: Total assistance Homemaking: Total assistance Grooming/Bathing/Dressing Activities of Daily Living Cognitive Retraining Safety/Judgement: Awareness of environment; Fall prevention Functional Transfers Toilet Transfer : Minimum assistance Bed/Mat Mobility Supine to Sit: Maximum assistance (pt unable to use R UE due to pain from a fall a couple of mo) Sit to Stand: Minimum assistance Bed to Chair: Minimum assistance Most Recent Physical Functioning:  
Gross Assessment: 
  
         
  
Posture: 
Posture (WDL): Within defined limits Balance: 
Sitting: Intact Standing: Impaired Standing - Static: Fair Standing - Dynamic : Fair Bed Mobility: 
Supine to Sit: Maximum assistance (pt unable to use R UE due to pain from a fall a couple of mo) Wheelchair Mobility: 
  
Transfers: 
Sit to Stand: Minimum assistance Stand to Sit: Minimum assistance Bed to Chair: Minimum assistance Patient Vitals for the past 6 hrs: 
 BP BP Patient Position SpO2 O2 Flow Rate (L/min) Pulse 09/10/18 0748 - - 92 % 3 l/min -  
09/10/18 0816 - - - 3 l/min -  
09/10/18 1110 122/70 At rest;Supine 99 % - 76  
09/10/18 1314 - - - 3 l/min -  
09/10/18 1330 - - 95 % 3 l/min - Mental Status Neurologic State: Alert, Eyes open to stimulus Orientation Level: Oriented X4 Cognition: Appropriate for age attention/concentration, Appropriate decision making Perseveration: No perseveration noted Safety/Judgement: Awareness of environment, Fall prevention Physical Skills Involved: 1. Range of Motion 2. Balance 3. Strength 4. Activity Tolerance 5. Fine Motor Control 6. Gross Motor Control 7. Pain (acute) Cognitive Skills Affected (resulting in the inability to perform in a timely and safe manner): 1. none Psychosocial Skills Affected: 1. Habits/Routines 2. Social Interaction Number of elements that affect the Plan of Care: 5+:  HIGH COMPLEXITY CLINICAL DECISION MAKIN [facility] [address] [id number] AM-PAC 6 Clicks Daily Activity Inpatient Short Form How much help from another person does the patient currently need. .. Total A Lot A Little None 1. Putting on and taking off regular lower body clothing? [x] 1   [] 2   [] 3   [] 4  
2. Bathing (including washing, rinsing, drying)? [] 1   [x] 2   [] 3   [] 4  
3.   Toileting, which includes using toilet, bedpan or urinal?   [] 1   [x] 2   [] 3   [] 4  
 4.  Putting on and taking off regular upper body clothing? [] 1   [x] 2   [] 3   [] 4  
5. Taking care of personal grooming such as brushing teeth? [] 1   [x] 2   [] 3   [] 4  
6. Eating meals? [] 1   [] 2   [x] 3   [] 4  
© 2007, Trustees of [address] [place] [address] [address] [id number], under license to [employer]. All rights reserved Score:  Initial: CL  12 Most Recent: X (Date: -- ) Interpretation of Tool:  Represents activities that are increasingly more difficult (i.e. Bed mobility, Transfers, Gait). Score 24 23 22-20 19-15 14-10 9-7 6 Modifier CH CI CJ CK CL CM CN   
 
? Self Care:  
  - CURRENT STATUS: CL - 60%-79% impaired, limited or restricted  - GOAL STATUS: CK - 40%-59% impaired, limited or restricted  - D/C STATUS:  ---------------To be determined--------------- [place] / [place] / Product Type: Medicare /   
 
Medical Necessity:    
· Patient demonstrates good rehab potential due to higher previous functional level. Reason for Services/Other Comments: 
· Patient continues to require skilled intervention due to patient's inability to take care of self. Use of outcome tool(s) and clinical judgement create a POC that gives a: MODERATE COMPLEXITY  
 
 
 
TREATMENT:  
(In addition to Assessment/Re-Assessment sessions the following treatments were rendered) Pre-treatment Symptoms/Complaints:   
Pain: Initial:  
Pain Intensity 1: 0 Pain Location 1: Knee, Shoulder Pain Orientation 1: Left, Right Pain Intervention(s) 1: Ambulation/Increased Activity, Rest, Emotional support  Post Session:  0 Self Care: (18 minutes): Procedure(s) (per grid) utilized to improve and/or restore self-care/home management as related to toileting. Required maximal verbal and tactile cueing to facilitate activities of daily living skills and compensatory activities. Therapeutic Activity: (    12 minutes):   Therapeutic activities including Bed transfers, Chair transfers and Toilet transfers to improve mobility, strength and balance. Required Min assist with exception of max assist for supine to sit   to promote static and dynamic balance in standing. Braces/Orthotics/Lines/Etc:  
· IV 
· O2 Device: Nasal cannula Treatment/Session Assessment:   
· Response to Treatment:  positive · Interdisciplinary Collaboration:  
o Registered Nurse · After treatment position/precautions:  
o Up in chair 
o Bed/Chair-wheels locked 
o Bed in low position 
o Call light within reach 
o RN notified · Compliance with Program/Exercises: compliant all of the time. · Recommendations/Intent for next treatment session: \"Next visit will focus on advancements to more challenging activities and reduction in assistance provided\". Total Treatment Duration: OT Patient Time In/Time Out Time In: 3141 Time Out: 1235 Marcelina Hurley OT

## 2018-09-10 NOTE — CONSULTS
PM&R Rehab Consult Subjective:  
 
Date of Consultation:  September 10, 2018 Referring Physician: Dr. Lorena Rivas Patient is a 77 y.o. female who is being seen for rehab recommendations secondary to physical debility s/p significant anemia, fall and mental status changes Principal Problem: Anemia (9/7/2018) Active Problems: 
  MDS (myelodysplastic syndrome) ([address] (12/17/2011) Overview: Procrit started in August, 2011 12/18/11 Procrit weekly and Iron stores. 5-12 12-13-12 good response to 3 weekly procrit did not need it last time 3-7-13 Pt doing well. Just wanted a \"check-up. \" Responding to Procrit  
    every three weeks. 8-29-13 patient has missed some injections on recently restarted  
    hemoglobin only issue , takes oral iron iron stores each time CAD (coronary artery disease) (1/20/2013) Overview: 5/8/14 PCI LAD with stent placed Chronic combined systolic and diastolic heart failure ([address] (1/20/2013) Overview: 5/8/14 ECHO:  EF 10-15% 12/2017:  EF 25-30% Essential hypertension, benign (1/20/2013) Anticoagulated on Coumadin (7/9/2013) Overview: S/P AVR 
 
  CKD (chronic kidney disease) stage 3, GFR 30-59 ml/min (7/10/2013) COPD (chronic obstructive pulmonary disease) ([address] (4/2/2014) REJI (obstructive sleep apnea)-cpap (4/2/2014) Diabetes mellitus type 2, diet-controlled ([address] (8/28/2014) ICD (implantable cardioverter-defibrillator) in place (10/2/2014) Overview: Biotronik single-chamber ICD implantation 10/20/14 S/P AVR (aortic valve replacement) () Overview: Mechanical/Artific Obesity, morbid ([address] (6/8/2018) Chronic respiratory failure with hypoxia ([address] (9/7/2018) Acute kidney injury superimposed on chronic kidney disease ([address] (9/7/2018) Coagulopathy ([address] INR >4 on admission 9/7/18 (9/8/2018) Traumatic hematoma of left knee (9/8/2018) Debility (9/8/2018) HPI; Ms. Sahil Bhardwaj is a delightful, functionally mod I 78 yo female well known to me from her [facility] stay in June 2018 s/p fall with bilateral wrist fxs. She has an extensive PMH including CAD /PCI, CKD, Cardiomyopathy, CHF, COPD with REJI and chronic hypoxic respiratory failure on Trilogy at [facility] , on chronic anticoagulation with Coumadin for hx of mechanical AVR , and ?MDS (Had Bone marrow biopsy that was nondiagnostic however unable to rule out lower grade MDS0 with chronic anemia. She is followed by Dr Cesar Snowden of Heme/onc and receives intermittent blood transfusions and Procrit monthly. She presented to the ED on 9/7  after falling in her BR on 8/28. Fortunately, her aid was there at the house assisting her. She had sustained a left knee injury. She presented due to weakness, dizziness and overall fatigue and family noted worsening confusion. She had received one unit of blood on 8/31 for a Hgb of 6.7. In the ED, her hgb was 6.5. She received a blood transfusion and her hgb improved to 9.3 and is currently 9.6. An Xray of the left knee showed pretibial soft tissue edema and swelling, but no evidence of fracture or other acute bony abnormality. She was seen in consultation by orthopedist, Dr Yeni Aguayo. Her INR was 4.3 on admission, with goal of 2.5-3.5. Coumadin has been on hold. He plans epidermolysis of the blisters soon. She has been seen by PT, requiring minimal assist with STS and ambulated 15ft with a RW with CGA. OT pending. Past Medical History:  
Diagnosis Date  Anticoagulated on Coumadin 7/9/2013 S/P AVR  CAD (coronary artery disease) 1/20/2013 5/8/14 PCI LAD with stent placed  Cardiomyopathy ([address]  CHF (congestive heart failure) ([address] 2/17/2017  CKD (chronic kidney disease) stage 3, GFR 30-59 ml/min 7/10/2013  COPD (chronic obstructive pulmonary disease) ([address] 4/2/2014  Essential hypertension, benign 1/20/2013  HLD (hyperlipidemia)  ICD (implantable cardioverter-defibrillator) in place 10/2/2014 Biotronik single-chamber ICD implantation 10/20/14  Iron deficiency anemia due to chronic blood loss 7/29/2009  MDS (myelodysplastic syndrome) [address] [address] 12/17/2011 Procrit started in August, 2011 12/18/11 Procrit weekly and Iron stores. 5-12 12-13-12 good response to 3 weekly procrit did not need it last time  3-7-13 Pt doing well. Just wanted a \"check-up. \" Responding to Procrit every three weeks. 8-29-13 patient has missed some injections on recently restarted hemoglobin only issue , takes oral iron iron stores each time  REJI (obstructive sleep apnea)-cpap 4/2/2014  Osteoarthritis  Osteopenia  Quadrantanopia  Visual complaint 12/14/2015 Family History Problem Relation Age of Onset  Heart Disease Mother CHF  Hypertension Mother  Kidney Disease Mother  Heart Disease Father CHF  Lung Disease Father  Diabetes Father  Cancer Father   
  prostate  Hypertension Father  Heart Attack Father  Heart Disease Maternal Aunt  Diabetes Brother  Coronary Artery Disease Other  Breast Cancer Neg Hx Social History Substance Use Topics  Smoking status: Former Smoker Packs/day: 0.25 Years: 42.00 Types: Cigarettes Start date: 10/1/1956 Quit date: 5/1/2014  Smokeless tobacco: Never Used  Alcohol use No  
Home Environment: Apartment # Steps to Enter: 3 One/Two Story Residence: One story Living Alone: Yes Support Systems: Family member(s) Patient Expects to be Discharged to[de-identified] Rehabilitation facility Current DME Used/Available at Home: Oxygen, portable, Shower chair, Raised toilet seat, Walker, rolling Prior Level of Function/Work/Activity: 
Independent with aide 4 days a week, home o2 
age, repeated falls Past Surgical History:  
Procedure Laterality Date [address]    HX AORTIC VALVE REPLACEMENT  ,   
 mechanical valve   HX  SECTION    
 HX CORONARY STENT PLACEMENT  May, 2014 STEMI with Stent placement.  HX ENDOSCOPY  2009 EGD [address] CATHETERIZATION    HX PACEMAKER  10/2/2014 Biotronik ICD  HX TUBAL LIGATION    
 IR BX BONE MARROW DIAGNOSTIC  2011 Prior to Admission medications Medication Sig Start Date End Date Taking? Authorizing Provider  
spironolactone (ALDACTONE) 25 mg tablet Take 1 Tab by mouth two (2) times a day. Pill bottles states 3.5 daily but patient is taking one in am and one in pm 18  Yes Silver Gonzalez MD  
carvedilol (COREG) 3.125 mg tablet Take 2 Tabs by mouth two (2) times daily (with meals). 18  Yes Silver Gonzalez MD  
traMADol (ULTRAM) 50 mg tablet TAKE 1 TABLET BY MOUTH EVERY 4 HOURS AS NEEDED 18  Yes Silver Gonzalez MD  
sacubitril-valsartan (ENTRESTO) 24 mg/26 mg tablet Take 1 Tab by mouth two (2) times a day. INDICATIONS: CHRONIC HEART FAILURE 18  Yes Leandro Amezquita MD  
multivit-minerals/folic acid (ADULT ONE DAILY MULTIVITAMIN PO) Take 1 Tab by mouth daily. Yes Historical Provider  
aspirin delayed-release 81 mg tablet Take 81 mg by mouth daily. Yes Historical Provider  
warfarin (COUMADIN) 5 mg tablet Take 1 Tab by mouth five (5) days a week. Patient taking differently: Take 1 Tab by mouth five (5) days a week. , Tuesday, Thursday, Friday. 18  Yes Mariana Monroe, DO  
warfarin (COUMADIN) 2.5 mg tablet Take 1 Tab by mouth two (2) days a week. Patient taking differently: Take 1 Tab by mouth two (2) days a week. Monday and 18  Yes Rinku Milligan, DO  
ferrous gluconate 324 mg (38 mg iron) tablet TAKE 1 TAB BY MOUTH DAILY (BEFORE BREAKFAST). INDICATIONS: IRON DEFICIENCY ANEMIA Patient taking differently: two (2) times a day.  TAKE 1 TAB BY MOUTH DAILY (BEFORE BREAKFAST). INDICATIONS: IRON DEFICIENCY ANEMIA Takes twice a day 4/5/18  Yes Leigh Stauffer MD  
cholecalciferol, VITAMIN D3, (VITAMIN D3) 5,000 unit tab tablet Take 1 Tab by mouth daily. 6/15/16  Yes Leigh Stauffer MD  
OXYGEN-AIR DELIVERY SYSTEMS 3 L by Nasal route continuous. Yes Historical Provider  
simvastatin (ZOCOR) 20 mg tablet TAKE 1 TABLET BY MOUTH EVERY DAY 8/21/18   Leigh Stauffer MD  
isosorbide mononitrate ER (IMDUR) 30 mg tablet TAKE 1 TAB BY MOUTH EVERY MORNING. 8/21/18   Leigh Stauffer MD  
furosemide (LASIX) 40 mg tablet Take 2 Tabs by mouth two (2) times a day. 8/21/18   Leigh Stauffer MD  
escitalopram oxalate (LEXAPRO) 20 mg tablet TAKE 1 TAB BY MOUTH DAILY. INDICATIONS: ANXIETY WITH DEPRESSION 8/21/18   Leigh Stauffer MD  
calcium citrate 200 mg (950 mg) tablet Take 200 mg by mouth two (2) times a day. Historical Provider  
ascorbic acid, vitamin C, (VITAMIN C) 500 mg tablet Take 500 mg by mouth daily. Historical Provider  
fluticasone (FLONASE) 50 mcg/actuation nasal spray 2 Sprays by Both Nostrils route daily as needed. 3/7/18   Leigh Stauffer MD  
umeclidinium (INCRUSE ELLIPTA) 62.5 mcg/actuation inhaler Take 1 Puff by inhalation daily. Indications: j44.9 2/27/18   Tiffanie Urbina NP  
loratadine (CLARITIN) 10 mg tablet TAKE 1 TAB BY MOUTH DAILY. Patient taking differently: TAKE 1 TAB BY MOUTH DAILY PRN 1/2/18   CAROLINA Hook  
mometasone-formoterol (DULERA) 200-5 mcg/actuation HFA inhaler 2 puffs bid, rinse mouth after use. 8/30/17   Tiffanie Urbina NP  
albuterol (PROVENTIL VENTOLIN) 2.5 mg /3 mL (0.083 %) nebulizer solution 3 mL by Nebulization route every four (4) hours as needed for Wheezing.  8/30/17   Tiffanie Urbina NP  
albuterol (VENTOLIN HFA) 90 mcg/actuation inhaler 2 puffs qid prn 8/30/17   Tiffanie Urbina NP  
acetaminophen (TYLENOL) 325 mg tablet Take 650 mg by mouth every four (4) hours as needed for Pain. Historical Provider  
nitroglycerin (NITROSTAT) 0.4 mg SL tablet 0.4 mg by SubLINGual route every five (5) minutes as needed. Historical Provider Allergies Allergen Reactions  Sulfa (Sulfonamide Antibiotics) Hives  Aspirin Nausea Only Cannot take uncoated aspirin Review of Systems:  A comprehensive review of systems was negative except for: Constitutional: positive for fatigue Respiratory: positive for dyspnea on exertion, emphysema or REJI Genitourinary: positive for frequency Integument/breast: positive for skin lesion(s), dryness and blisters to left knee and shin Hematologic/lymphatic: positive for easy bruising and Patient is compliant with PT/INR monitoring with INR goal of 2.5-3. Musculoskeletal: positive for myalgias, stiff joints, back pain and muscle weakness Neurological: positive for memory problems, coordination problems, gait problems and weakness Behvioral/Psych: positive for depression Objective:  
 
Vitals: 
Blood pressure 129/59, pulse 78, temperature 98.7 °F (37.1 °C), resp. rate 20, height 5' 2\" (1.575 m), weight 220 lb 9.6 oz (100.1 kg), SpO2 92 %. Temp (24hrs), Av.3 °F (36.8 °C), Min:97.5 °F (36.4 °C), Max:98.9 °F (37.2 °C) Intake and Output: 
 1901 - 09/10 0700 In: 2279 [P.O.:920; I.V.:843] Out: 4775 [[facility]:[id number]] Physical Exam: 
General:  Alert, oriented and mood affect appropriate; did get a bit tearful when talking about fear of losing independence Lungs:   Clear to auscultation bilaterally. Heart:  Regular rate and rhythm, S1, S2 stable, + murmur, No click, rub or gallop. Abdomen:   Soft, non-tender. Bowel sounds present. No masses,  No organomegaly. obese Genitourinary: defer Neuro Muscular: Generalized non focal weakness, prox weakness> distal weakness Sensation intact Spasms /tremor LLE; ?secondary to pain Executive dysfxn in thought processing noted Skin:  No rashes,or signs/symptoms or infection. Left upper tibial blisters x 2 . Distal shin with bruising and small blisters. Venous stasis changes needed; no evidence of infxn No calf tenderness, neg Ambreen's   
 
 
Labs/Studies: 
Recent Results (from the past 72 hour(s)) CBC WITH AUTOMATED DIFF Collection Time: 09/07/18  5:52 PM  
Result Value Ref Range WBC 6.9 4.3 - 11.1 K/uL  
 RBC 2.23 (L) 4.05 - 5.2 M/uL HGB 6.5 (LL) 11.7 - 15.4 g/dL HCT 22.0 (L) 35.8 - 46.3 % MCV 98.7 (H) 79.6 - 97.8 FL  
 MCH 29.1 26.1 - 32.9 PG  
 MCHC 29.5 (L) 31.4 - 35.0 g/dL  
 RDW 14.6 % PLATELET 187 063 - 969 K/uL MPV 10.2 9.4 - 12.3 FL ABSOLUTE NRBC 0.00 0.0 - 0.2 K/uL  
 DF AUTOMATED NEUTROPHILS 74 43 - 78 % LYMPHOCYTES 17 13 - 44 % MONOCYTES 8 4.0 - 12.0 % EOSINOPHILS 1 0.5 - 7.8 % BASOPHILS 0 0.0 - 2.0 % IMMATURE GRANULOCYTES 0 0.0 - 5.0 %  
 ABS. NEUTROPHILS 5.1 1.7 - 8.2 K/UL  
 ABS. LYMPHOCYTES 1.2 0.5 - 4.6 K/UL  
 ABS. MONOCYTES 0.6 0.1 - 1.3 K/UL  
 ABS. EOSINOPHILS 0.1 0.0 - 0.8 K/UL  
 ABS. BASOPHILS 0.0 0.0 - 0.2 K/UL  
 ABS. IMM. GRANS. 0.0 0.0 - 0.5 K/UL METABOLIC PANEL, COMPREHENSIVE Collection Time: 09/07/18  5:52 PM  
Result Value Ref Range Sodium 136 136 - 145 mmol/L Potassium 5.2 (H) 3.5 - 5.1 mmol/L Chloride 94 (L) 98 - 107 mmol/L  
 CO2 40 (H) 21 - 32 mmol/L Anion gap 2 (L) 7 - 16 mmol/L Glucose 139 (H) 65 - 100 mg/dL BUN 68 (H) 8 - 23 MG/DL Creatinine 2.19 (H) 0.6 - 1.0 MG/DL  
 GFR est AA 29 (L) >60 ml/min/1.73m2 GFR est non-AA 24 (L) >60 ml/min/1.73m2 Calcium 9.2 8.3 - 10.4 MG/DL Bilirubin, total 0.8 0.2 - 1.1 MG/DL  
 ALT (SGPT) 17 12 - 65 U/L  
 AST (SGOT) 11 (L) 15 - 37 U/L Alk. phosphatase 48 (L) 50 - 136 U/L Protein, total 6.9 6.3 - 8.2 g/dL Albumin 3.2 3.2 - 4.6 g/dL Globulin 3.7 (H) 2.3 - 3.5 g/dL A-G Ratio 0.9 (L) 1.2 - 3.5 PROTHROMBIN TIME + INR  Collection Time: 09/07/18  5:52 PM  
 Result Value Ref Range Prothrombin time 39.5 (H) 11.5 - 14.5 sec INR 4.3 (HH)    
EKG, 12 LEAD, INITIAL Collection Time: 09/07/18  6:10 PM  
Result Value Ref Range Ventricular Rate 67 BPM  
 Atrial Rate 67 BPM  
 P-R Interval 196 ms QRS Duration 134 ms Q-T Interval 404 ms QTC Calculation (Bezet) 426 ms Calculated P Axis 48 degrees Calculated R Axis -53 degrees Calculated T Axis 105 degrees Diagnosis    
  !! AGE AND GENDER SPECIFIC ECG ANALYSIS !! Normal sinus rhythm Left axis deviation Non-specific intra-ventricular conduction block Inferior infarct (cited on or before 29-JAN-2000) Possible Anterolateral infarct , age undetermined Abnormal ECG When compared with ECG of 28-APR-2018 00:55, 
Premature ventricular complexes are no longer Present Borderline criteria for Anterolateral infarct are now Present Confirmed by Shavon Herrera ([id number]) on 9/8/2018 5:41:55 AM 
  
TYPE + CROSSMATCH Collection Time: 09/07/18  6:37 PM  
Result Value Ref Range Crossmatch Expiration 09/10/2018 ABO/Rh(D) A POSITIVE Antibody screen NEG Unit number [id number] Blood component type [facility] Unit division 00 Status of unit TRANSFUSED Crossmatch result Compatible Unit number [id number] Blood component type [facility] Unit division 00 Status of unit TRANSFUSED Crossmatch result Compatible INFLUENZA A & B AG (RAPID TEST) Collection Time: 09/07/18  9:39 PM  
Result Value Ref Range Influenza A Ag NEGATIVE  NEG Influenza B Ag NEGATIVE  NEG Source NASOPHARYNGEAL    
GLUCOSE, POC Collection Time: 09/08/18  7:55 AM  
Result Value Ref Range Glucose (POC) 95 65 - 100 mg/dL METABOLIC PANEL, BASIC Collection Time: 09/08/18  8:17 AM  
Result Value Ref Range Sodium 140 136 - 145 mmol/L Potassium 4.7 3.5 - 5.1 mmol/L Chloride 96 (L) 98 - 107 mmol/L  
 CO2 38 (H) 21 - 32 mmol/L  Anion gap 6 (L) 7 - 16 mmol/L  
 Glucose 93 65 - 100 mg/dL BUN 62 (H) 8 - 23 MG/DL Creatinine 1.75 (H) 0.6 - 1.0 MG/DL  
 GFR est AA 37 (L) >60 ml/min/1.73m2 GFR est non-AA 31 (L) >60 ml/min/1.73m2 Calcium 9.4 8.3 - 10.4 MG/DL  
CBC WITH AUTOMATED DIFF Collection Time: 09/08/18  8:17 AM  
Result Value Ref Range WBC 7.2 4.3 - 11.1 K/uL  
 RBC 3.13 (L) 4.05 - 5.2 M/uL HGB 9.3 (L) 11.7 - 15.4 g/dL HCT 29.5 (L) 35.8 - 46.3 % MCV 94.2 79.6 - 97.8 FL  
 MCH 29.7 26.1 - 32.9 PG  
 MCHC 31.5 31.4 - 35.0 g/dL  
 RDW 15.5 % PLATELET 830 188 - 638 K/uL MPV 10.3 9.4 - 12.3 FL ABSOLUTE NRBC 0.00 0.0 - 0.2 K/uL  
 DF AUTOMATED NEUTROPHILS 69 43 - 78 % LYMPHOCYTES 19 13 - 44 % MONOCYTES 10 4.0 - 12.0 % EOSINOPHILS 2 0.5 - 7.8 % BASOPHILS 0 0.0 - 2.0 % IMMATURE GRANULOCYTES 0 0.0 - 5.0 %  
 ABS. NEUTROPHILS 5.0 1.7 - 8.2 K/UL  
 ABS. LYMPHOCYTES 1.4 0.5 - 4.6 K/UL  
 ABS. MONOCYTES 0.7 0.1 - 1.3 K/UL  
 ABS. EOSINOPHILS 0.2 0.0 - 0.8 K/UL  
 ABS. BASOPHILS 0.0 0.0 - 0.2 K/UL  
 ABS. IMM. GRANS. 0.0 0.0 - 0.5 K/UL PROTHROMBIN TIME + INR Collection Time: 09/08/18  8:17 AM  
Result Value Ref Range Prothrombin time 32.6 (H) 11.5 - 14.5 sec INR 3.4 GLUCOSE, POC Collection Time: 09/08/18 11:08 AM  
Result Value Ref Range Glucose (POC) 184 (H) 65 - 100 mg/dL GLUCOSE, POC Collection Time: 09/08/18  4:34 PM  
Result Value Ref Range Glucose (POC) 113 (H) 65 - 100 mg/dL GLUCOSE, POC Collection Time: 09/08/18  8:05 PM  
Result Value Ref Range Glucose (POC) 158 (H) 65 - 100 mg/dL METABOLIC PANEL, BASIC Collection Time: 09/09/18  4:59 AM  
Result Value Ref Range Sodium 142 136 - 145 mmol/L Potassium 5.0 3.5 - 5.1 mmol/L Chloride 98 98 - 107 mmol/L  
 CO2 38 (H) 21 - 32 mmol/L Anion gap 6 (L) 7 - 16 mmol/L Glucose 87 65 - 100 mg/dL BUN 55 (H) 8 - 23 MG/DL Creatinine 1.56 (H) 0.6 - 1.0 MG/DL  
 GFR est AA 42 (L) >60 ml/min/1.73m2 GFR est non-AA 35 (L) >60 ml/min/1.73m2 Calcium 9.6 8.3 - 10.4 MG/DL  
CBC WITH AUTOMATED DIFF Collection Time: 09/09/18  4:59 AM  
Result Value Ref Range WBC 7.0 4.3 - 11.1 K/uL  
 RBC 3.15 (L) 4.05 - 5.2 M/uL HGB 9.0 (L) 11.7 - 15.4 g/dL HCT 30.1 (L) 35.8 - 46.3 % MCV 95.6 79.6 - 97.8 FL  
 MCH 28.6 26.1 - 32.9 PG  
 MCHC 29.9 (L) 31.4 - 35.0 g/dL  
 RDW 15.9 % PLATELET 817 001 - 779 K/uL MPV 10.2 9.4 - 12.3 FL ABSOLUTE NRBC 0.00 0.0 - 0.2 K/uL  
 DF AUTOMATED NEUTROPHILS 66 43 - 78 % LYMPHOCYTES 21 13 - 44 % MONOCYTES 10 4.0 - 12.0 % EOSINOPHILS 2 0.5 - 7.8 % BASOPHILS 0 0.0 - 2.0 % IMMATURE GRANULOCYTES 0 0.0 - 5.0 %  
 ABS. NEUTROPHILS 4.7 1.7 - 8.2 K/UL  
 ABS. LYMPHOCYTES 1.5 0.5 - 4.6 K/UL  
 ABS. MONOCYTES 0.7 0.1 - 1.3 K/UL  
 ABS. EOSINOPHILS 0.1 0.0 - 0.8 K/UL  
 ABS. BASOPHILS 0.0 0.0 - 0.2 K/UL  
 ABS. IMM. GRANS. 0.0 0.0 - 0.5 K/UL PROTHROMBIN TIME + INR Collection Time: 09/09/18  4:59 AM  
Result Value Ref Range Prothrombin time 28.5 (H) 11.5 - 14.5 sec INR 2.8 GLUCOSE, POC Collection Time: 09/09/18  7:53 AM  
Result Value Ref Range Glucose (POC) 108 (H) 65 - 100 mg/dL GLUCOSE, POC Collection Time: 09/09/18 11:02 AM  
Result Value Ref Range Glucose (POC) 244 (H) 65 - 100 mg/dL GLUCOSE, POC Collection Time: 09/09/18  4:06 PM  
Result Value Ref Range Glucose (POC) 97 65 - 100 mg/dL GLUCOSE, POC Collection Time: 09/09/18  8:36 PM  
Result Value Ref Range Glucose (POC) 190 (H) 65 - 100 mg/dL PROTHROMBIN TIME + INR Collection Time: 09/10/18  4:47 AM  
Result Value Ref Range Prothrombin time 21.5 (H) 11.5 - 14.5 sec INR 2.0    
CBC WITH AUTOMATED DIFF Collection Time: 09/10/18  4:47 AM  
Result Value Ref Range WBC 8.2 4.3 - 11.1 K/uL  
 RBC 3.26 (L) 4.05 - 5.2 M/uL HGB 9.6 (L) 11.7 - 15.4 g/dL HCT 30.7 (L) 35.8 - 46.3 %  MCV 94.2 79.6 - 97.8 FL  
 MCH 29.4 26.1 - 32.9 PG  
 MCHC 31.3 (L) 31.4 - 35.0 g/dL  
 RDW 15.4 % PLATELET 726 484 - 299 K/uL MPV 10.3 9.4 - 12.3 FL ABSOLUTE NRBC 0.00 0.0 - 0.2 K/uL  
 DF AUTOMATED NEUTROPHILS 68 43 - 78 % LYMPHOCYTES 20 13 - 44 % MONOCYTES 9 4.0 - 12.0 % EOSINOPHILS 2 0.5 - 7.8 % BASOPHILS 0 0.0 - 2.0 % IMMATURE GRANULOCYTES 0 0.0 - 5.0 %  
 ABS. NEUTROPHILS 5.5 1.7 - 8.2 K/UL  
 ABS. LYMPHOCYTES 1.7 0.5 - 4.6 K/UL  
 ABS. MONOCYTES 0.8 0.1 - 1.3 K/UL  
 ABS. EOSINOPHILS 0.2 0.0 - 0.8 K/UL  
 ABS. BASOPHILS 0.0 0.0 - 0.2 K/UL  
 ABS. IMM. GRANS. 0.0 0.0 - 0.5 K/UL METABOLIC PANEL, BASIC Collection Time: 09/10/18  4:47 AM  
Result Value Ref Range Sodium 140 136 - 145 mmol/L Potassium 4.6 3.5 - 5.1 mmol/L Chloride 91 (L) 98 - 107 mmol/L  
 CO2 42 (HH) 21 - 32 mmol/L Anion gap 7 7 - 16 mmol/L Glucose 100 65 - 100 mg/dL BUN 55 (H) 8 - 23 MG/DL Creatinine 1.55 (H) 0.6 - 1.0 MG/DL  
 GFR est AA 43 (L) >60 ml/min/1.73m2 GFR est non-AA 35 (L) >60 ml/min/1.73m2 Calcium 9.8 8.3 - 10.4 MG/DL  
GLUCOSE, POC Collection Time: 09/10/18  7:29 AM  
Result Value Ref Range Glucose (POC) 108 (H) 65 - 100 mg/dL PLEASE READ & DOCUMENT PPD TEST IN 24 HRS Collection Time: 09/10/18  8:16 AM  
Result Value Ref Range PPD Negative Negative  
 mm Induration 0 mm Functional Assessment: 
Functional Assessment Decline in Gait/Transfer/Balance: Yes (comment) Decline in Capacity to Feed/Dress/Bathe: No 
Developmental Delay: No 
Chewing/Swallowing Problems: No 
Difficulty with Secretions: No 
Speech Slurred/Thick/Garbled: No  
 
 
 
Ambulation: Activity and Safety Activity Level: Up with Assistance Ambulate: No (Comment) Activity: In bed, Eating, Resting quietly Activity Assistance: Partial (two people) Weight Bearing Status: WBAT (Weight Bearing as Tolerated) Mode of Transportation: Stretcher, Oxygen Repositioned: Head of bed elevated (degrees) Patient Turned: Other (comment) (sitting up in bed to eat breakfast) Head of Bed Elevated: Self regulated Safety Measures: Bed/Chair-Wheels locked, Bed in low position, Call light within reach, Fall prevention (comment), Gripper socks, Side rails X2 Impression/Plan:  
 
Principal Problem: Anemia (9/7/2018) Active Problems: 
  MDS (myelodysplastic syndrome) ([address] (12/17/2011) Overview: Procrit started in August, 2011 12/18/11 Procrit weekly and Iron stores. 5-12 12-13-12 good response to 3 weekly procrit did not need it last time 3-7-13 Pt doing well. Just wanted a \"check-up. \" Responding to Procrit  
    every three weeks. 8-29-13 patient has missed some injections on recently restarted  
    hemoglobin only issue , takes oral iron iron stores each time CAD (coronary artery disease) (1/20/2013) Overview: 5/8/14 PCI LAD with stent placed Chronic combined systolic and diastolic heart failure ([address] (1/20/2013) Overview: 5/8/14 ECHO:  EF 10-15% 12/2017:  EF 25-30% Essential hypertension, benign (1/20/2013) Anticoagulated on Coumadin (7/9/2013) Overview: S/P AVR 
 
  CKD (chronic kidney disease) stage 3, GFR 30-59 ml/min (7/10/2013) COPD (chronic obstructive pulmonary disease) ([address] (4/2/2014) REJI (obstructive sleep apnea)-cpap (4/2/2014) Diabetes mellitus type 2, diet-controlled ([address] (8/28/2014) ICD (implantable cardioverter-defibrillator) in place (10/2/2014) Overview: Biotronik single-chamber ICD implantation 10/20/14 S/P AVR (aortic valve replacement) () Overview: Mechanical/Artific Obesity, morbid ([address] (6/8/2018) Chronic respiratory failure with hypoxia ([address] (9/7/2018) Acute kidney injury superimposed on chronic kidney disease ([address] (9/7/2018) Coagulopathy ([address] INR >4 on admission 9/7/18 (9/8/2018) Traumatic hematoma of left knee (9/8/2018) Debility (9/8/2018) Physical Debility; Acute on Chronic with multiple medical comorbidities Recommendations: Continue Acute Rehab Program 
Coordination of rehab/medical care Counseling of PM & R care issues management Monitoring and management of medical conditions per plan of care/orders -Ms Diaz is well known to me from prior [facility] stay. She is highly motivated and fiercely protective of her right to live intermittently with family assist and an intermittent care giver. Pts medical needs and potential complications require closer medical monitoring throughout her rehab stay; thus, rec [facility] for a comprehensive , multidisciplinary rehab progrm. She will benefit from and tolerate 3hrs of therapy daily . Will need close monitoring of coagulopathy, anemia with need for intermittent blood transfusion, chronic hypoxic respiratory failure with profound REJI req Trilogy at [facility] and advanced heart dz with EF of 10-25 % at risk for decompensation. Requiring Trilogy excludes her from the majority of skilled environments. [facility] pending medical clearance. -OT eval needed and pending at this time Discussion with pt/Staff Reviewed Therapies/Labs/Meds/Records Thank you Signed By:  Sima Yeung MD   
 September 10, 2018

## 2018-09-10 NOTE — PROGRESS NOTES
END OF SHIFT NOTE: 
 
Intake/Output 09/09 1901 - 09/10 0700 In: -  
[facility] 7671 [facility] Voiding: YES Catheter: NO 
Drain:  
External Female Catheter 09/09/18 (Active) Site Assessment Clean, dry, & intact 9/10/2018  5:15 AM  
Repositioned Yes 9/10/2018  5:15 AM  
Perineal Care Yes 9/10/2018  5:15 AM  
Wick Changed Yes 9/10/2018  5:15 AM  
Suction Canister/Tubing Changed Yes 9/10/2018  5:15 AM  
Urine Output (mL) 750 ml 9/10/2018  4:05 AM  
 
 
 
 
 
 
Stool:  0 occurrences. Emesis:  0 occurrences. VITAL SIGNS Patient Vitals for the past 12 hrs: 
 Temp Pulse Resp BP SpO2  
09/10/18 0301 97.5 °F (36.4 °C) 74 18 124/71 90 % 09/09/18 2303 97.9 °F (36.6 °C) 68 18 133/77 93 % 09/09/18 2235 - - - - 96 % 09/09/18 2105 - - - - 97 % 09/09/18 1930 98.4 °F (36.9 °C) 67 18 129/66 94 % Pain Assessment Pain 1 Pain Scale 1: Numeric (0 - 10) (09/10/18 0220) Pain Intensity 1: 0 (09/10/18 0220) Patient Stated Pain Goal: 0 (09/10/18 0220) Pain Reassessment 1: Yes (09/09/18 1238) Pain Location 1: Knee (09/09/18 1143) Pain Orientation 1: Left (09/09/18 1143) Pain Description 1: Aching (09/09/18 1143) Pain Intervention(s) 1: Emotional support (09/09/18 1143) Ambulating No 
 
Additional Information: Patient rested well. Uneventful night. Shift report given to oncoming nurse at the bedside. Jere Gregorio

## 2018-09-10 NOTE — PROGRESS NOTES
CM c/s for short term rehab placement. Patient has home trilogy. Only SNF that will take a patient with a triology is [facility]. Patient has been to 9th floor IR earlier this year and would like to go back there. Referral placed and I spoke with [facility] regarding consult. PT/OT ordered. Patient has had PPD placed. Informed patient that if the 9th floor cannot take patient that the only other facility is [facility]. Patient verbalized understanding. Will continue to follow. Care Management Interventions PCP Verified by CM: Yes Transition of Care Consult (CM Consult): Discharge Planning Physical Therapy Consult: Yes Occupational Therapy Consult: Yes 
Plan discussed with Pt/Family/Caregiver: Yes Freedom of Choice Offered: Yes Discharge Location Discharge Placement: Unable to determine at this time

## 2018-09-11 ENCOUNTER — HOSPITAL ENCOUNTER (INPATIENT)
Age: 68
LOS: 12 days | Discharge: HOME HEALTH | DRG: 948 | End: 2018-09-23
Attending: PHYSICAL MEDICINE & REHABILITATION | Admitting: PHYSICAL MEDICINE & REHABILITATION
Payer: MEDICARE

## 2018-09-11 VITALS
TEMPERATURE: 98.8 F | WEIGHT: 212.6 LBS | HEIGHT: 62 IN | RESPIRATION RATE: 17 BRPM | OXYGEN SATURATION: 92 % | SYSTOLIC BLOOD PRESSURE: 124 MMHG | HEART RATE: 75 BPM | DIASTOLIC BLOOD PRESSURE: 67 MMHG | BODY MASS INDEX: 39.12 KG/M2

## 2018-09-11 DIAGNOSIS — M85.80 OSTEOPENIA, UNSPECIFIED LOCATION: ICD-10-CM

## 2018-09-11 DIAGNOSIS — S06.5XAA SUBDURAL HEMATOMA: ICD-10-CM

## 2018-09-11 DIAGNOSIS — D68.9 COAGULOPATHY (HCC): ICD-10-CM

## 2018-09-11 DIAGNOSIS — I27.20 PULMONARY HYPERTENSION (HCC): Chronic | ICD-10-CM

## 2018-09-11 DIAGNOSIS — S80.02XS TRAUMATIC HEMATOMA OF LEFT KNEE, SEQUELA: ICD-10-CM

## 2018-09-11 DIAGNOSIS — G47.33 OSA (OBSTRUCTIVE SLEEP APNEA): Chronic | ICD-10-CM

## 2018-09-11 DIAGNOSIS — S62.357S: ICD-10-CM

## 2018-09-11 DIAGNOSIS — J44.9 CHRONIC OBSTRUCTIVE PULMONARY DISEASE, UNSPECIFIED COPD TYPE (HCC): ICD-10-CM

## 2018-09-11 DIAGNOSIS — E11.21 TYPE 2 DIABETES MELLITUS WITH NEPHROPATHY (HCC): Chronic | ICD-10-CM

## 2018-09-11 DIAGNOSIS — E66.01 OBESITY, MORBID (HCC): ICD-10-CM

## 2018-09-11 DIAGNOSIS — R53.81 DEBILITY: ICD-10-CM

## 2018-09-11 DIAGNOSIS — Z79.01 ANTICOAGULATED ON COUMADIN: Chronic | ICD-10-CM

## 2018-09-11 DIAGNOSIS — E11.9 DIABETES MELLITUS TYPE 2, DIET-CONTROLLED (HCC): Chronic | ICD-10-CM

## 2018-09-11 DIAGNOSIS — R53.81 PHYSICAL DEBILITY: ICD-10-CM

## 2018-09-11 DIAGNOSIS — D46.9 MDS (MYELODYSPLASTIC SYNDROME) (HCC): Chronic | ICD-10-CM

## 2018-09-11 DIAGNOSIS — I42.9 CARDIOMYOPATHY, UNSPECIFIED TYPE (HCC): Chronic | ICD-10-CM

## 2018-09-11 DIAGNOSIS — Z79.01 LONG TERM (CURRENT) USE OF ANTICOAGULANTS: Chronic | ICD-10-CM

## 2018-09-11 DIAGNOSIS — I10 ESSENTIAL HYPERTENSION, BENIGN: Chronic | ICD-10-CM

## 2018-09-11 DIAGNOSIS — D50.0 IRON DEFICIENCY ANEMIA DUE TO CHRONIC BLOOD LOSS: Chronic | ICD-10-CM

## 2018-09-11 DIAGNOSIS — F34.1 DYSTHYMIA: ICD-10-CM

## 2018-09-11 DIAGNOSIS — Z95.810 ICD (IMPLANTABLE CARDIOVERTER-DEFIBRILLATOR) IN PLACE: ICD-10-CM

## 2018-09-11 DIAGNOSIS — N18.30 CKD (CHRONIC KIDNEY DISEASE) STAGE 3, GFR 30-59 ML/MIN (HCC): Chronic | ICD-10-CM

## 2018-09-11 DIAGNOSIS — M19.019 OSTEOARTHRITIS OF SHOULDER, UNSPECIFIED LATERALITY, UNSPECIFIED OSTEOARTHRITIS TYPE: ICD-10-CM

## 2018-09-11 DIAGNOSIS — N17.9 ACUTE KIDNEY INJURY SUPERIMPOSED ON CHRONIC KIDNEY DISEASE (HCC): ICD-10-CM

## 2018-09-11 DIAGNOSIS — Z95.2 S/P AVR (AORTIC VALVE REPLACEMENT): Chronic | ICD-10-CM

## 2018-09-11 DIAGNOSIS — J96.11 CHRONIC RESPIRATORY FAILURE WITH HYPOXIA (HCC): Chronic | ICD-10-CM

## 2018-09-11 DIAGNOSIS — D64.9 ANEMIA, UNSPECIFIED TYPE: ICD-10-CM

## 2018-09-11 DIAGNOSIS — E78.2 MIXED HYPERLIPIDEMIA: Chronic | ICD-10-CM

## 2018-09-11 DIAGNOSIS — I50.42 CHRONIC COMBINED SYSTOLIC AND DIASTOLIC HEART FAILURE (HCC): Chronic | ICD-10-CM

## 2018-09-11 DIAGNOSIS — Z23 ENCOUNTER FOR IMMUNIZATION: ICD-10-CM

## 2018-09-11 DIAGNOSIS — I25.10 CORONARY ARTERY DISEASE INVOLVING NATIVE CORONARY ARTERY OF NATIVE HEART WITHOUT ANGINA PECTORIS: ICD-10-CM

## 2018-09-11 DIAGNOSIS — N18.9 ACUTE KIDNEY INJURY SUPERIMPOSED ON CHRONIC KIDNEY DISEASE (HCC): ICD-10-CM

## 2018-09-11 LAB
ANION GAP SERPL CALC-SCNC: 8 MMOL/L (ref 7–16)
BASOPHILS # BLD: 0 K/UL (ref 0–0.2)
BASOPHILS NFR BLD: 0 % (ref 0–2)
BUN SERPL-MCNC: 62 MG/DL (ref 8–23)
CALCIUM SERPL-MCNC: 9.7 MG/DL (ref 8.3–10.4)
CHLORIDE SERPL-SCNC: 92 MMOL/L (ref 98–107)
CO2 SERPL-SCNC: 42 MMOL/L (ref 21–32)
CREAT SERPL-MCNC: 2.05 MG/DL (ref 0.6–1)
DIFFERENTIAL METHOD BLD: ABNORMAL
EOSINOPHIL # BLD: 0.2 K/UL (ref 0–0.8)
EOSINOPHIL NFR BLD: 2 % (ref 0.5–7.8)
ERYTHROCYTE [DISTWIDTH] IN BLOOD BY AUTOMATED COUNT: 15.4 %
GLUCOSE BLD STRIP.AUTO-MCNC: 106 MG/DL (ref 65–100)
GLUCOSE BLD STRIP.AUTO-MCNC: 117 MG/DL (ref 65–100)
GLUCOSE BLD STRIP.AUTO-MCNC: 155 MG/DL (ref 65–100)
GLUCOSE BLD STRIP.AUTO-MCNC: 186 MG/DL (ref 65–100)
GLUCOSE SERPL-MCNC: 120 MG/DL (ref 65–100)
HCT VFR BLD AUTO: 30.4 % (ref 35.8–46.3)
HGB BLD-MCNC: 9.5 G/DL (ref 11.7–15.4)
IMM GRANULOCYTES # BLD: 0 K/UL (ref 0–0.5)
IMM GRANULOCYTES NFR BLD AUTO: 0 % (ref 0–5)
LYMPHOCYTES # BLD: 2.3 K/UL (ref 0.5–4.6)
LYMPHOCYTES NFR BLD: 28 % (ref 13–44)
MCH RBC QN AUTO: 29 PG (ref 26.1–32.9)
MCHC RBC AUTO-ENTMCNC: 31.3 G/DL (ref 31.4–35)
MCV RBC AUTO: 92.7 FL (ref 79.6–97.8)
MM INDURATION POC: NORMAL MM (ref 0–5)
MONOCYTES # BLD: 0.8 K/UL (ref 0.1–1.3)
MONOCYTES NFR BLD: 10 % (ref 4–12)
NEUTS SEG # BLD: 5 K/UL (ref 1.7–8.2)
NEUTS SEG NFR BLD: 60 % (ref 43–78)
NRBC # BLD: 0 K/UL (ref 0–0.2)
PLATELET # BLD AUTO: 215 K/UL (ref 150–450)
PMV BLD AUTO: 10.1 FL (ref 9.4–12.3)
POTASSIUM SERPL-SCNC: 4.4 MMOL/L (ref 3.5–5.1)
PPD POC: NORMAL NEGATIVE
RBC # BLD AUTO: 3.28 M/UL (ref 4.05–5.2)
SODIUM SERPL-SCNC: 142 MMOL/L (ref 136–145)
WBC # BLD AUTO: 8.4 K/UL (ref 4.3–11.1)

## 2018-09-11 PROCEDURE — 74011250637 HC RX REV CODE- 250/637: Performed by: INTERNAL MEDICINE

## 2018-09-11 PROCEDURE — 97530 THERAPEUTIC ACTIVITIES: CPT

## 2018-09-11 PROCEDURE — 94640 AIRWAY INHALATION TREATMENT: CPT

## 2018-09-11 PROCEDURE — 85025 COMPLETE CBC W/AUTO DIFF WBC: CPT

## 2018-09-11 PROCEDURE — 36415 COLL VENOUS BLD VENIPUNCTURE: CPT

## 2018-09-11 PROCEDURE — 74011000250 HC RX REV CODE- 250: Performed by: PHYSICAL MEDICINE & REHABILITATION

## 2018-09-11 PROCEDURE — 99223 1ST HOSP IP/OBS HIGH 75: CPT | Performed by: PHYSICAL MEDICINE & REHABILITATION

## 2018-09-11 PROCEDURE — 74011636637 HC RX REV CODE- 636/637: Performed by: INTERNAL MEDICINE

## 2018-09-11 PROCEDURE — 85610 PROTHROMBIN TIME: CPT

## 2018-09-11 PROCEDURE — 74011250636 HC RX REV CODE- 250/636: Performed by: PHYSICAL MEDICINE & REHABILITATION

## 2018-09-11 PROCEDURE — 74011636637 HC RX REV CODE- 636/637: Performed by: PHYSICAL MEDICINE & REHABILITATION

## 2018-09-11 PROCEDURE — 0H9LXZZ DRAINAGE OF LEFT LOWER LEG SKIN, EXTERNAL APPROACH: ICD-10-PCS | Performed by: SURGERY

## 2018-09-11 PROCEDURE — 74011250637 HC RX REV CODE- 250/637: Performed by: SURGERY

## 2018-09-11 PROCEDURE — 65310000000 HC RM PRIVATE REHAB

## 2018-09-11 PROCEDURE — 74011000250 HC RX REV CODE- 250: Performed by: INTERNAL MEDICINE

## 2018-09-11 PROCEDURE — 94760 N-INVAS EAR/PLS OXIMETRY 1: CPT

## 2018-09-11 PROCEDURE — 82962 GLUCOSE BLOOD TEST: CPT

## 2018-09-11 PROCEDURE — 80048 BASIC METABOLIC PNL TOTAL CA: CPT

## 2018-09-11 PROCEDURE — 77010033678 HC OXYGEN DAILY

## 2018-09-11 PROCEDURE — 75810000112 HC INC/DRN ABCESS SIMP

## 2018-09-11 PROCEDURE — 74011250637 HC RX REV CODE- 250/637: Performed by: PHYSICAL MEDICINE & REHABILITATION

## 2018-09-11 RX ORDER — CARVEDILOL 6.25 MG/1
6.25 TABLET ORAL 2 TIMES DAILY WITH MEALS
Status: DISCONTINUED | OUTPATIENT
Start: 2018-09-11 | End: 2018-09-23 | Stop reason: HOSPADM

## 2018-09-11 RX ORDER — CARVEDILOL 6.25 MG/1
6.25 TABLET ORAL 2 TIMES DAILY WITH MEALS
Status: CANCELLED | OUTPATIENT
Start: 2018-09-11

## 2018-09-11 RX ORDER — ASCORBIC ACID 500 MG
500 TABLET ORAL DAILY
Status: DISCONTINUED | OUTPATIENT
Start: 2018-09-12 | End: 2018-09-23 | Stop reason: HOSPADM

## 2018-09-11 RX ORDER — ESCITALOPRAM OXALATE 10 MG/1
20 TABLET ORAL DAILY
Status: DISCONTINUED | OUTPATIENT
Start: 2018-09-12 | End: 2018-09-23 | Stop reason: HOSPADM

## 2018-09-11 RX ORDER — INSULIN LISPRO 100 [IU]/ML
INJECTION, SOLUTION INTRAVENOUS; SUBCUTANEOUS
Status: DISCONTINUED | OUTPATIENT
Start: 2018-09-11 | End: 2018-09-19

## 2018-09-11 RX ORDER — OXYCODONE HYDROCHLORIDE 5 MG/1
5-10 TABLET ORAL
Status: DISCONTINUED | OUTPATIENT
Start: 2018-09-11 | End: 2018-09-23 | Stop reason: HOSPADM

## 2018-09-11 RX ORDER — SODIUM CHLORIDE 0.9 % (FLUSH) 0.9 %
5-10 SYRINGE (ML) INJECTION AS NEEDED
Status: DISCONTINUED | OUTPATIENT
Start: 2018-09-11 | End: 2018-09-23 | Stop reason: HOSPADM

## 2018-09-11 RX ORDER — WARFARIN SODIUM 5 MG/1
5 TABLET ORAL EVERY EVENING
Status: DISCONTINUED | OUTPATIENT
Start: 2018-09-11 | End: 2018-09-14

## 2018-09-11 RX ORDER — POLYETHYLENE GLYCOL 3350 17 G/17G
17 POWDER, FOR SOLUTION ORAL DAILY PRN
Status: CANCELLED | OUTPATIENT
Start: 2018-09-11

## 2018-09-11 RX ORDER — ASCORBIC ACID 500 MG
500 TABLET ORAL DAILY
Status: CANCELLED | OUTPATIENT
Start: 2018-09-12

## 2018-09-11 RX ORDER — ISOSORBIDE MONONITRATE 30 MG/1
30 TABLET, EXTENDED RELEASE ORAL DAILY
Status: DISCONTINUED | OUTPATIENT
Start: 2018-09-12 | End: 2018-09-23 | Stop reason: HOSPADM

## 2018-09-11 RX ORDER — OXYCODONE HYDROCHLORIDE 5 MG/1
5-10 TABLET ORAL
Status: CANCELLED | OUTPATIENT
Start: 2018-09-11

## 2018-09-11 RX ORDER — FUROSEMIDE 20 MG/1
80 TABLET ORAL 2 TIMES DAILY
Status: CANCELLED | OUTPATIENT
Start: 2018-09-11

## 2018-09-11 RX ORDER — SIMVASTATIN 20 MG/1
20 TABLET, FILM COATED ORAL
Status: CANCELLED | OUTPATIENT
Start: 2018-09-11

## 2018-09-11 RX ORDER — SODIUM CHLORIDE 9 MG/ML
250 INJECTION, SOLUTION INTRAVENOUS AS NEEDED
Status: DISCONTINUED | OUTPATIENT
Start: 2018-09-11 | End: 2018-09-23 | Stop reason: HOSPADM

## 2018-09-11 RX ORDER — WARFARIN SODIUM 5 MG/1
5 TABLET ORAL EVERY EVENING
Status: DISCONTINUED | OUTPATIENT
Start: 2018-09-11 | End: 2018-09-11 | Stop reason: HOSPADM

## 2018-09-11 RX ORDER — ACETAMINOPHEN 325 MG/1
650 TABLET ORAL
Status: DISCONTINUED | OUTPATIENT
Start: 2018-09-11 | End: 2018-09-23 | Stop reason: HOSPADM

## 2018-09-11 RX ORDER — ONDANSETRON 8 MG/1
4 TABLET, ORALLY DISINTEGRATING ORAL
Status: CANCELLED | OUTPATIENT
Start: 2018-09-11

## 2018-09-11 RX ORDER — TRAMADOL HYDROCHLORIDE 50 MG/1
50 TABLET ORAL
Status: DISCONTINUED | OUTPATIENT
Start: 2018-09-11 | End: 2018-09-23 | Stop reason: HOSPADM

## 2018-09-11 RX ORDER — POLYETHYLENE GLYCOL 3350 17 G/17G
17 POWDER, FOR SOLUTION ORAL DAILY PRN
Status: DISCONTINUED | OUTPATIENT
Start: 2018-09-11 | End: 2018-09-23 | Stop reason: HOSPADM

## 2018-09-11 RX ORDER — INSULIN LISPRO 100 [IU]/ML
INJECTION, SOLUTION INTRAVENOUS; SUBCUTANEOUS
Status: CANCELLED | OUTPATIENT
Start: 2018-09-11

## 2018-09-11 RX ORDER — ONDANSETRON 4 MG/1
4 TABLET, ORALLY DISINTEGRATING ORAL
Status: DISCONTINUED | OUTPATIENT
Start: 2018-09-11 | End: 2018-09-23 | Stop reason: HOSPADM

## 2018-09-11 RX ORDER — MAG HYDROX/ALUMINUM HYD/SIMETH 200-200-20
30 SUSPENSION, ORAL (FINAL DOSE FORM) ORAL
Status: CANCELLED | OUTPATIENT
Start: 2018-09-11

## 2018-09-11 RX ORDER — ALBUTEROL SULFATE 0.83 MG/ML
2.5 SOLUTION RESPIRATORY (INHALATION)
Status: DISCONTINUED | OUTPATIENT
Start: 2018-09-11 | End: 2018-09-23 | Stop reason: HOSPADM

## 2018-09-11 RX ORDER — SODIUM CHLORIDE 9 MG/ML
250 INJECTION, SOLUTION INTRAVENOUS AS NEEDED
Status: CANCELLED | OUTPATIENT
Start: 2018-09-11

## 2018-09-11 RX ORDER — FACIAL-BODY WIPES
10 EACH TOPICAL DAILY PRN
Status: DISCONTINUED | OUTPATIENT
Start: 2018-09-11 | End: 2018-09-23 | Stop reason: HOSPADM

## 2018-09-11 RX ORDER — PANTOPRAZOLE SODIUM 40 MG/1
40 TABLET, DELAYED RELEASE ORAL
Status: DISCONTINUED | OUTPATIENT
Start: 2018-09-12 | End: 2018-09-23 | Stop reason: HOSPADM

## 2018-09-11 RX ORDER — ENOXAPARIN SODIUM 100 MG/ML
100 INJECTION SUBCUTANEOUS EVERY 24 HOURS
Status: DISCONTINUED | OUTPATIENT
Start: 2018-09-11 | End: 2018-09-20

## 2018-09-11 RX ORDER — SODIUM CHLORIDE 0.9 % (FLUSH) 0.9 %
5-10 SYRINGE (ML) INJECTION AS NEEDED
Status: CANCELLED | OUTPATIENT
Start: 2018-09-11

## 2018-09-11 RX ORDER — ASPIRIN 81 MG/1
81 TABLET ORAL DAILY
Status: DISCONTINUED | OUTPATIENT
Start: 2018-09-12 | End: 2018-09-23 | Stop reason: HOSPADM

## 2018-09-11 RX ORDER — TRAMADOL HYDROCHLORIDE 50 MG/1
50 TABLET ORAL
Status: CANCELLED | OUTPATIENT
Start: 2018-09-11

## 2018-09-11 RX ORDER — ALBUTEROL SULFATE 0.83 MG/ML
2.5 SOLUTION RESPIRATORY (INHALATION)
Status: CANCELLED | OUTPATIENT
Start: 2018-09-11

## 2018-09-11 RX ORDER — BUDESONIDE 0.5 MG/2ML
500 INHALANT ORAL
Status: CANCELLED | OUTPATIENT
Start: 2018-09-11

## 2018-09-11 RX ORDER — PANTOPRAZOLE SODIUM 40 MG/1
40 TABLET, DELAYED RELEASE ORAL
Status: CANCELLED | OUTPATIENT
Start: 2018-09-12

## 2018-09-11 RX ORDER — ACETAMINOPHEN 325 MG/1
650 TABLET ORAL
Status: CANCELLED | OUTPATIENT
Start: 2018-09-11

## 2018-09-11 RX ORDER — WARFARIN SODIUM 5 MG/1
5 TABLET ORAL EVERY EVENING
Status: CANCELLED | OUTPATIENT
Start: 2018-09-11

## 2018-09-11 RX ORDER — FACIAL-BODY WIPES
10 EACH TOPICAL DAILY PRN
Status: CANCELLED | OUTPATIENT
Start: 2018-09-11

## 2018-09-11 RX ORDER — ESCITALOPRAM OXALATE 10 MG/1
20 TABLET ORAL DAILY
Status: CANCELLED | OUTPATIENT
Start: 2018-09-12

## 2018-09-11 RX ORDER — MORPHINE SULFATE 2 MG/ML
2 INJECTION, SOLUTION INTRAMUSCULAR; INTRAVENOUS
Status: CANCELLED | OUTPATIENT
Start: 2018-09-11

## 2018-09-11 RX ORDER — ENOXAPARIN SODIUM 100 MG/ML
100 INJECTION SUBCUTANEOUS EVERY 24 HOURS
Status: DISCONTINUED | OUTPATIENT
Start: 2018-09-11 | End: 2018-09-11 | Stop reason: HOSPADM

## 2018-09-11 RX ORDER — ISOSORBIDE MONONITRATE 30 MG/1
30 TABLET, EXTENDED RELEASE ORAL DAILY
Status: CANCELLED | OUTPATIENT
Start: 2018-09-12

## 2018-09-11 RX ORDER — MAG HYDROX/ALUMINUM HYD/SIMETH 200-200-20
30 SUSPENSION, ORAL (FINAL DOSE FORM) ORAL
Status: DISCONTINUED | OUTPATIENT
Start: 2018-09-11 | End: 2018-09-23 | Stop reason: HOSPADM

## 2018-09-11 RX ORDER — MORPHINE SULFATE 10 MG/ML
2 INJECTION, SOLUTION INTRAMUSCULAR; INTRAVENOUS
Status: DISCONTINUED | OUTPATIENT
Start: 2018-09-11 | End: 2018-09-15

## 2018-09-11 RX ORDER — ENOXAPARIN SODIUM 100 MG/ML
100 INJECTION SUBCUTANEOUS EVERY 24 HOURS
Status: CANCELLED | OUTPATIENT
Start: 2018-09-11

## 2018-09-11 RX ORDER — ENOXAPARIN SODIUM 100 MG/ML
100 INJECTION SUBCUTANEOUS EVERY 24 HOURS
Qty: 1 SYRINGE | Refills: 0 | Status: SHIPPED
Start: 2018-09-11 | End: 2018-09-23

## 2018-09-11 RX ORDER — FUROSEMIDE 40 MG/1
80 TABLET ORAL 2 TIMES DAILY
Status: DISCONTINUED | OUTPATIENT
Start: 2018-09-11 | End: 2018-09-12

## 2018-09-11 RX ORDER — BUDESONIDE 0.5 MG/2ML
500 INHALANT ORAL
Status: DISCONTINUED | OUTPATIENT
Start: 2018-09-11 | End: 2018-09-23 | Stop reason: HOSPADM

## 2018-09-11 RX ORDER — SIMVASTATIN 20 MG/1
20 TABLET, FILM COATED ORAL
Status: DISCONTINUED | OUTPATIENT
Start: 2018-09-11 | End: 2018-09-23 | Stop reason: HOSPADM

## 2018-09-11 RX ORDER — SODIUM CHLORIDE 0.9 % (FLUSH) 0.9 %
5-10 SYRINGE (ML) INJECTION EVERY 8 HOURS
Status: CANCELLED | OUTPATIENT
Start: 2018-09-11

## 2018-09-11 RX ORDER — ASPIRIN 81 MG/1
81 TABLET ORAL DAILY
Status: CANCELLED | OUTPATIENT
Start: 2018-09-12

## 2018-09-11 RX ORDER — SODIUM CHLORIDE 0.9 % (FLUSH) 0.9 %
5-10 SYRINGE (ML) INJECTION EVERY 8 HOURS
Status: DISCONTINUED | OUTPATIENT
Start: 2018-09-11 | End: 2018-09-16

## 2018-09-11 RX ADMIN — FUROSEMIDE 80 MG: 40 TABLET ORAL at 17:10

## 2018-09-11 RX ADMIN — TIOTROPIUM BROMIDE 18 MCG: 18 CAPSULE ORAL; RESPIRATORY (INHALATION) at 07:30

## 2018-09-11 RX ADMIN — ESCITALOPRAM OXALATE 20 MG: 10 TABLET ORAL at 08:16

## 2018-09-11 RX ADMIN — CARVEDILOL 6.25 MG: 6.25 TABLET, FILM COATED ORAL at 08:16

## 2018-09-11 RX ADMIN — TRAMADOL HYDROCHLORIDE 50 MG: 50 TABLET, FILM COATED ORAL at 08:25

## 2018-09-11 RX ADMIN — TRAMADOL HYDROCHLORIDE 50 MG: 50 TABLET, FILM COATED ORAL at 22:19

## 2018-09-11 RX ADMIN — POLYETHYLENE GLYCOL 3350 17 G: 17 POWDER, FOR SOLUTION ORAL at 08:16

## 2018-09-11 RX ADMIN — BUDESONIDE 500 MCG: 0.5 INHALANT RESPIRATORY (INHALATION) at 07:30

## 2018-09-11 RX ADMIN — Medication 10 ML: at 17:30

## 2018-09-11 RX ADMIN — INSULIN LISPRO 2 UNITS: 100 INJECTION, SOLUTION INTRAVENOUS; SUBCUTANEOUS at 11:20

## 2018-09-11 RX ADMIN — INSULIN LISPRO 2 UNITS: 100 INJECTION, SOLUTION INTRAVENOUS; SUBCUTANEOUS at 22:19

## 2018-09-11 RX ADMIN — ENOXAPARIN SODIUM 100 MG: 100 INJECTION SUBCUTANEOUS at 17:09

## 2018-09-11 RX ADMIN — WARFARIN SODIUM 5 MG: 5 TABLET ORAL at 17:10

## 2018-09-11 RX ADMIN — CARVEDILOL 6.25 MG: 6.25 TABLET, FILM COATED ORAL at 17:10

## 2018-09-11 RX ADMIN — ALBUTEROL SULFATE 2.5 MG: 2.5 SOLUTION RESPIRATORY (INHALATION) at 17:35

## 2018-09-11 RX ADMIN — Medication 10 ML: at 22:20

## 2018-09-11 RX ADMIN — ALBUTEROL SULFATE 2.5 MG: 2.5 SOLUTION RESPIRATORY (INHALATION) at 07:30

## 2018-09-11 RX ADMIN — OXYCODONE HYDROCHLORIDE AND ACETAMINOPHEN 500 MG: 500 TABLET ORAL at 08:16

## 2018-09-11 RX ADMIN — SIMVASTATIN 20 MG: 20 TABLET, FILM COATED ORAL at 22:19

## 2018-09-11 RX ADMIN — ASPIRIN 81 MG: 81 TABLET, COATED ORAL at 08:16

## 2018-09-11 RX ADMIN — FUROSEMIDE 80 MG: 20 TABLET ORAL at 08:16

## 2018-09-11 RX ADMIN — BUDESONIDE 500 MCG: 0.5 INHALANT RESPIRATORY (INHALATION) at 17:35

## 2018-09-11 RX ADMIN — TRAMADOL HYDROCHLORIDE 50 MG: 50 TABLET, FILM COATED ORAL at 17:15

## 2018-09-11 RX ADMIN — Medication 10 ML: at 08:16

## 2018-09-11 RX ADMIN — ISOSORBIDE MONONITRATE 30 MG: 30 TABLET, EXTENDED RELEASE ORAL at 08:16

## 2018-09-11 NOTE — PROGRESS NOTES
Warfarin dosing per pharmacist 
 
Jeanette Edis Vaz is a 77 y.o. female. Height: 5' 2\" (157.5 cm)    Weight: 96.4 kg (212 lb 9.6 oz) (212.6 lbs) Indication:  S/p AVR Goal INR:  2-3 Home dose:  5 mg Sun and 2.5 mg all other days  (Weekly dose of 20 mg) Risk factors or significant drug interactions:  none Other anticoagulants:  Lovenox bridge Daily Monitoring Date  INR     Warfarin dose HGB              Notes 9/8  3.4  Hold  9.3 
9/9  2.8  Hold  9.0  
9/10  2.0  Hold  9.6 9/11  1.5  5mg   9.5 Pharmacy has been consulted to dose warfarin for this patient due to a history of AVR (mechanical). The INR goal per the patient's home warfarin clinic is 2-3. Patient initially presented with a supratherapeutic INR that has trended down. INR was down to 1.5 today and knee hematoma was drained by surgery. Discussed with Dr. Farhad Funk and ok to restart warfarin tonight. He also wants to bridge patient with Lovenox. Will dose Lovenox at 1mg/kg q 24 hours due to renal function. Will give a 5mg dose of warfarin tonight. Continue to monitor daily. Thank you, Jim Mazariegos, GaviotaD

## 2018-09-11 NOTE — H&P
HISTORY AND PHYSICAL IRC Admit Date: 9/11/2018 Surgeon: Dr Zhang Issa Primary Care Physician: Bakari Leonard MD 
Specialty Group: Hospitalist 
 
Chief Complaint : Gait dysfunction secondary to below. Admit Diagnosis: debility; Physical debility Acute Rehab Dx: 
Gait impairment/ gait dysfunction Debility   
deconditioning Mobility and ambulation deficits Self Care/ADL deficits Medical Dx: 
Past Medical History:  
Diagnosis Date  Anticoagulated on Coumadin 7/9/2013 S/P AVR  CAD (coronary artery disease) 1/20/2013 5/8/14 PCI LAD with stent placed  Cardiomyopathy ([facility] [address]  CHF (congestive heart failure) ([facility] [address] 2/17/2017  CKD (chronic kidney disease) stage 3, GFR 30-59 ml/min 7/10/2013  COPD (chronic obstructive pulmonary disease) ([facility] [address] 4/2/2014  Essential hypertension, benign 1/20/2013  HLD (hyperlipidemia)  ICD (implantable cardioverter-defibrillator) in place 10/2/2014 Biotronik single-chamber ICD implantation 10/20/14  Iron deficiency anemia due to chronic blood loss 7/29/2009  MDS (myelodysplastic syndrome) ([facility] [address] 12/17/2011 Procrit started in August, 2011 12/18/11 Procrit weekly and Iron stores. 5-12 12-13-12 good response to 3 weekly procrit did not need it last time  3-7-13 Pt doing well. Just wanted a \"check-up. \" Responding to Procrit every three weeks. 8-29-13 patient has missed some injections on recently restarted hemoglobin only issue , takes oral iron iron stores each time  REJI (obstructive sleep apnea)-cpap 4/2/2014  Osteoarthritis  Osteopenia  Quadrantanopia  Visual complaint 12/14/2015 Date of Evaluation:  September 11, 2018 HPI: Stacie Lawrence is a 77 y.o. female patient at [address] [place] [address] who was admitted on 9/11/2018  by Elina Hewitt MD with below mentioned medical history ,is being seen for Physical Medicine and Rehabilitation. HPI; Ms. Darlin De La Rosa is a delightful, functionally mod I 78 yo female well known to me from her [facility] stay in June 2018 s/p fall with bilateral wrist fxs. She has an extensive PMH including CAD /PCI, CKD, Cardiomyopathy, CHF, COPD with REJI and chronic hypoxic respiratory failure on Trilogy at [facility] , on chronic anticoagulation with Coumadin for hx of mechanical AVR , and ?MDS (Had Bone marrow biopsy that was nondiagnostic however unable to rule out lower grade MDS0 with chronic anemia. She is followed by Dr Qian Hines of Heme/onc and receives intermittent blood transfusions and Procrit monthly. She presented to the ED on 9/7  after falling in her BR on 8/28. Fortunately, her aid was there at the house assisting her. She had sustained a left knee injury. She presented due to weakness, dizziness and overall fatigue and family noted worsening confusion. She had received one unit of blood on 8/31 for a Hgb of 6.7. In the ED, her hgb was 6.5. She received a blood transfusion and her hgb improved to 9.3 and is currently 9.6. An Xray of the left knee showed pretibial soft tissue edema and swelling, but no evidence of fracture or other acute bony abnormality. She was seen in consultation by orthopedist, Dr Axel Johnson. Her INR was 4.3 on admission, with goal of 2.5-3.0. Coumadin has been on hold. He performed epidermolysis of the blisters earlier today. She is subtherapeutic on coumadin and is being restarted on it today. She is also on renal dosed therapeutic Lovenox. Her renal fxn has bumped back up to a creatinine of 2.05 from 1.55, her baseline is approx 1.8-2 She has been seen by PT, requiring minimal assist with STS and ambulated 15ft with a RW with CGA. OT reports max assist with ADLS Physical therapy was initiated and patient was starting to mobilize. However, Patient shows significant functional deficits due to prolonged immobility and hospitalization Our service was consulted for rehab needs and we recommended inpatient hospital rehabilitation is reasonable and necessary due to ongoing acute medical issues which have not changed since initial pre-admission evaluation. and rehab needs still likely best managed in IRU setting. The patient was evaluated by [facility] admissions coordinators. I reviewed the preadmission screening and have approved for admission to the [facility]. The patient was cleared for transfer to rehab today. Patient continues to have ongoing  medical issues outlined above requiring physician medical supervision and functional deficits which would benefit from continued intensive therapies. Current Level of Function: (evaluated by acute therapy staff, with bed mobility, transfers, balance personally observed post-admission in the IRF setting minutes prior to submission of document)  minimal assist with STS and ambulated 15ft with a RW with CGA. OT reports max assist with ADLS Prior Level of Function/Home Situation:  
Home Environment: Apartment # Steps to Enter: 3 One/Two Story Residence: One story Living Alone: Yes Support Systems: Family member(s) Patient Expects to be Discharged to[de-identified] Rehabilitation facility Current DME Used/Available at Home: Oxygen, portable, Shower chair, Raised toilet seat, Walker, rolling Tub or Shower Type: Tub/Shower combination Prior Level of Function/Work/Activity: 
Assist with bathing & dressing. Patient able to microwave already prepared meals with modified independence. Past Medical History:  
Diagnosis Date  Anticoagulated on Coumadin 7/9/2013 S/P AVR  CAD (coronary artery disease) 1/20/2013 5/8/14 PCI LAD with stent placed  Cardiomyopathy ([address]  CHF (congestive heart failure) ([address] 2/17/2017  CKD (chronic kidney disease) stage 3, GFR 30-59 ml/min 7/10/2013  COPD (chronic obstructive pulmonary disease) ([address] 4/2/2014  Essential hypertension, benign 1/20/2013  HLD (hyperlipidemia)  ICD (implantable cardioverter-defibrillator) in place 10/2/2014 Biotronik single-chamber ICD implantation 10/20/14  Iron deficiency anemia due to chronic blood loss 2009  MDS (myelodysplastic syndrome) ([facility] [address] 2011 Procrit started in 11 Procrit weekly and Iron stores. 12 good response to 3 weekly procrit did not need it last time  3-7-13 Pt doing well. Just wanted a \"check-up. \" Responding to Procrit every three weeks. 13 patient has missed some injections on recently restarted hemoglobin only issue , takes oral iron iron stores each time  REJI (obstructive sleep apnea)-cpap 2014  Osteoarthritis  Osteopenia  Quadrantanopia  Visual complaint 2015 Past Surgical History:  
Procedure Laterality Date [address]    HX AORTIC VALVE REPLACEMENT  ,   
 mechanical valve   HX  SECTION    
 HX CORONARY STENT PLACEMENT  May, 2014 STEMI with Stent placement.  HX ENDOSCOPY  2009 EGD [address] CATHETERIZATION    HX PACEMAKER  10/2/2014 Biotronik ICD  HX TUBAL LIGATION    
 IR BX BONE MARROW DIAGNOSTIC  2011 Allergies Allergen Reactions  Sulfa (Sulfonamide Antibiotics) Hives  Aspirin Nausea Only Cannot take uncoated aspirin Family History Problem Relation Age of Onset  Heart Disease Mother CHF  Hypertension Mother  Kidney Disease Mother  Heart Disease Father CHF  Lung Disease Father  Diabetes Father  Cancer Father   
  prostate  Hypertension Father  Heart Attack Father  Heart Disease Maternal Aunt  Diabetes Brother  Coronary Artery Disease Other  Breast Cancer Neg Hx Social History Substance Use Topics  Smoking status: Former Smoker Packs/day: 0.25 Years: 42.00 Types: Cigarettes Start date: 10/1/1956 Quit date: 2014  Smokeless tobacco: Never Used  Alcohol use No  
  
No current facility-administered medications for this encounter. Review of Systems:  A comprehensive review of systems was negative except for: Constitutional: positive for fatigue Respiratory: positive for dyspnea on exertion, emphysema or REJI Genitourinary: positive for frequency Integument/breast: positive for skin lesion(s), dryness and blisters to left knee and shin Hematologic/lymphatic: positive for easy bruising and Patient is compliant with PT/INR monitoring with INR goal of 2.5-3. Musculoskeletal: positive for myalgias, stiff joints, back pain and muscle weakness Neurological: positive for memory problems, coordination problems, gait problems and weakness Behvioral/Psych: positive for depression Objective: There were no vitals taken for this visit. Intake and Output: 
  
 
Physical Exam: Psych: Patient was oriented to person, place, and time. Without hallucinations, abnormal affect or abnormal behaviors during the examination. Patient is not suicidal.  
General:   Alert, appears stated age, No acute distress. HEENT:  Normocephalic, EOMI, facial symmetry  Intact. Oral mucosa pink and moist. No nasal discharge. Lungs:  CTA Bilaterally,Respiration even and unlabored No apparent use of accessory muscles for respiration. No nasal alar flaring. Heart:  Regular rate and rhythm, S1, S2, + murmur, click, rub or gallop. Genitourinary: defered Abdomen:  Soft, non-tender to palpation in all four quadrants. Bowel sounds present. No obvious masses palpated. Neuro Muscular: Generalized non focal weakness, prox weakness> distal weakness Sensation intact Spasms /tremor LLE; ?secondary to pain Executive dysfxn in thought processing noted Skin:  No rashes,or signs/symptoms or infection. Left upper tibial blisters x 2 now drained. Moderate sanguine drainage persists . Distal shin with bruising and small blisters. Venous stasis changes needed; no evidence of infxn No calf tenderness, neg Ambreen's  
1+ edema Lab Review:   
Recent Results (from the past 72 hour(s)) GLUCOSE, POC Collection Time: 09/08/18  4:34 PM  
Result Value Ref Range Glucose (POC) 113 (H) 65 - 100 mg/dL GLUCOSE, POC Collection Time: 09/08/18  8:05 PM  
Result Value Ref Range Glucose (POC) 158 (H) 65 - 100 mg/dL METABOLIC PANEL, BASIC Collection Time: 09/09/18  4:59 AM  
Result Value Ref Range Sodium 142 136 - 145 mmol/L Potassium 5.0 3.5 - 5.1 mmol/L Chloride 98 98 - 107 mmol/L  
 CO2 38 (H) 21 - 32 mmol/L Anion gap 6 (L) 7 - 16 mmol/L Glucose 87 65 - 100 mg/dL BUN 55 (H) 8 - 23 MG/DL Creatinine 1.56 (H) 0.6 - 1.0 MG/DL  
 GFR est AA 42 (L) >60 ml/min/1.73m2 GFR est non-AA 35 (L) >60 ml/min/1.73m2 Calcium 9.6 8.3 - 10.4 MG/DL  
CBC WITH AUTOMATED DIFF Collection Time: 09/09/18  4:59 AM  
Result Value Ref Range WBC 7.0 4.3 - 11.1 K/uL  
 RBC 3.15 (L) 4.05 - 5.2 M/uL HGB 9.0 (L) 11.7 - 15.4 g/dL HCT 30.1 (L) 35.8 - 46.3 % MCV 95.6 79.6 - 97.8 FL  
 MCH 28.6 26.1 - 32.9 PG  
 MCHC 29.9 (L) 31.4 - 35.0 g/dL  
 RDW 15.9 % PLATELET 929 851 - 069 K/uL MPV 10.2 9.4 - 12.3 FL ABSOLUTE NRBC 0.00 0.0 - 0.2 K/uL  
 DF AUTOMATED NEUTROPHILS 66 43 - 78 % LYMPHOCYTES 21 13 - 44 % MONOCYTES 10 4.0 - 12.0 % EOSINOPHILS 2 0.5 - 7.8 % BASOPHILS 0 0.0 - 2.0 % IMMATURE GRANULOCYTES 0 0.0 - 5.0 %  
 ABS. NEUTROPHILS 4.7 1.7 - 8.2 K/UL  
 ABS. LYMPHOCYTES 1.5 0.5 - 4.6 K/UL  
 ABS. MONOCYTES 0.7 0.1 - 1.3 K/UL  
 ABS. EOSINOPHILS 0.1 0.0 - 0.8 K/UL  
 ABS. BASOPHILS 0.0 0.0 - 0.2 K/UL  
 ABS. IMM. GRANS. 0.0 0.0 - 0.5 K/UL PROTHROMBIN TIME + INR Collection Time: 09/09/18  4:59 AM  
Result Value Ref Range Prothrombin time 28.5 (H) 11.5 - 14.5 sec INR 2.8 GLUCOSE, POC Collection Time: 09/09/18  7:53 AM  
Result Value Ref Range Glucose (POC) 108 (H) 65 - 100 mg/dL GLUCOSE, POC Collection Time: 09/09/18 11:02 AM  
Result Value Ref Range Glucose (POC) 244 (H) 65 - 100 mg/dL GLUCOSE, POC Collection Time: 09/09/18  4:06 PM  
Result Value Ref Range Glucose (POC) 97 65 - 100 mg/dL GLUCOSE, POC Collection Time: 09/09/18  8:36 PM  
Result Value Ref Range Glucose (POC) 190 (H) 65 - 100 mg/dL PROTHROMBIN TIME + INR Collection Time: 09/10/18  4:47 AM  
Result Value Ref Range Prothrombin time 21.5 (H) 11.5 - 14.5 sec INR 2.0    
CBC WITH AUTOMATED DIFF Collection Time: 09/10/18  4:47 AM  
Result Value Ref Range WBC 8.2 4.3 - 11.1 K/uL  
 RBC 3.26 (L) 4.05 - 5.2 M/uL HGB 9.6 (L) 11.7 - 15.4 g/dL HCT 30.7 (L) 35.8 - 46.3 % MCV 94.2 79.6 - 97.8 FL  
 MCH 29.4 26.1 - 32.9 PG  
 MCHC 31.3 (L) 31.4 - 35.0 g/dL  
 RDW 15.4 % PLATELET 402 727 - 351 K/uL MPV 10.3 9.4 - 12.3 FL ABSOLUTE NRBC 0.00 0.0 - 0.2 K/uL  
 DF AUTOMATED NEUTROPHILS 68 43 - 78 % LYMPHOCYTES 20 13 - 44 % MONOCYTES 9 4.0 - 12.0 % EOSINOPHILS 2 0.5 - 7.8 % BASOPHILS 0 0.0 - 2.0 % IMMATURE GRANULOCYTES 0 0.0 - 5.0 %  
 ABS. NEUTROPHILS 5.5 1.7 - 8.2 K/UL  
 ABS. LYMPHOCYTES 1.7 0.5 - 4.6 K/UL  
 ABS. MONOCYTES 0.8 0.1 - 1.3 K/UL  
 ABS. EOSINOPHILS 0.2 0.0 - 0.8 K/UL  
 ABS. BASOPHILS 0.0 0.0 - 0.2 K/UL  
 ABS. IMM. GRANS. 0.0 0.0 - 0.5 K/UL METABOLIC PANEL, BASIC Collection Time: 09/10/18  4:47 AM  
Result Value Ref Range Sodium 140 136 - 145 mmol/L Potassium 4.6 3.5 - 5.1 mmol/L Chloride 91 (L) 98 - 107 mmol/L  
 CO2 42 (HH) 21 - 32 mmol/L Anion gap 7 7 - 16 mmol/L Glucose 100 65 - 100 mg/dL BUN 55 (H) 8 - 23 MG/DL Creatinine 1.55 (H) 0.6 - 1.0 MG/DL  
 GFR est AA 43 (L) >60 ml/min/1.73m2 GFR est non-AA 35 (L) >60 ml/min/1.73m2 Calcium 9.8 8.3 - 10.4 MG/DL  
GLUCOSE, POC Collection Time: 09/10/18  7:29 AM  
Result Value Ref Range Glucose (POC) 108 (H) 65 - 100 mg/dL PLEASE READ & DOCUMENT PPD TEST IN 24 HRS Collection Time: 09/10/18  8:16 AM  
Result Value Ref Range PPD Negative Negative  
 mm Induration 0 mm GLUCOSE, POC Collection Time: 09/10/18 11:13 AM  
Result Value Ref Range Glucose (POC) 153 (H) 65 - 100 mg/dL GLUCOSE, POC Collection Time: 09/10/18  4:26 PM  
Result Value Ref Range Glucose (POC) 157 (H) 65 - 100 mg/dL GLUCOSE, POC Collection Time: 09/10/18  9:00 PM  
Result Value Ref Range Glucose (POC) 115 (H) 65 - 100 mg/dL CBC WITH AUTOMATED DIFF Collection Time: 09/11/18  3:59 AM  
Result Value Ref Range WBC 8.4 4.3 - 11.1 K/uL  
 RBC 3.28 (L) 4.05 - 5.2 M/uL HGB 9.5 (L) 11.7 - 15.4 g/dL HCT 30.4 (L) 35.8 - 46.3 % MCV 92.7 79.6 - 97.8 FL  
 MCH 29.0 26.1 - 32.9 PG  
 MCHC 31.3 (L) 31.4 - 35.0 g/dL  
 RDW 15.4 % PLATELET 136 413 - 823 K/uL MPV 10.1 9.4 - 12.3 FL ABSOLUTE NRBC 0.00 0.0 - 0.2 K/uL  
 DF AUTOMATED NEUTROPHILS 60 43 - 78 % LYMPHOCYTES 28 13 - 44 % MONOCYTES 10 4.0 - 12.0 % EOSINOPHILS 2 0.5 - 7.8 % BASOPHILS 0 0.0 - 2.0 % IMMATURE GRANULOCYTES 0 0.0 - 5.0 %  
 ABS. NEUTROPHILS 5.0 1.7 - 8.2 K/UL  
 ABS. LYMPHOCYTES 2.3 0.5 - 4.6 K/UL  
 ABS. MONOCYTES 0.8 0.1 - 1.3 K/UL  
 ABS. EOSINOPHILS 0.2 0.0 - 0.8 K/UL  
 ABS. BASOPHILS 0.0 0.0 - 0.2 K/UL  
 ABS. IMM. GRANS. 0.0 0.0 - 0.5 K/UL METABOLIC PANEL, BASIC Collection Time: 09/11/18  3:59 AM  
Result Value Ref Range Sodium 142 136 - 145 mmol/L Potassium 4.4 3.5 - 5.1 mmol/L Chloride 92 (L) 98 - 107 mmol/L  
 CO2 42 (HH) 21 - 32 mmol/L Anion gap 8 7 - 16 mmol/L Glucose 120 (H) 65 - 100 mg/dL BUN 62 (H) 8 - 23 MG/DL Creatinine 2.05 (H) 0.6 - 1.0 MG/DL  
 GFR est AA 31 (L) >60 ml/min/1.73m2 GFR est non-AA 25 (L) >60 ml/min/1.73m2 Calcium 9.7 8.3 - 10.4 MG/DL PROTHROMBIN TIME + INR Collection Time: 09/11/18  3:59 AM  
Result Value Ref Range Prothrombin time 17.1 (H) 11.5 - 14.5 sec INR 1.5 GLUCOSE, POC Collection Time: 09/11/18  7:42 AM  
Result Value Ref Range Glucose (POC) 117 (H) 65 - 100 mg/dL PLEASE READ & DOCUMENT PPD TEST IN 48 HRS Collection Time: 09/11/18  9:27 AM  
Result Value Ref Range PPD  neggative Negative  
 mm Induration  0 mm GLUCOSE, POC Collection Time: 09/11/18 11:02 AM  
Result Value Ref Range Glucose (POC) 155 (H) 65 - 100 mg/dL Condition on Admission: Fair Assessment: Profound Physical Debility s/p admitted with moderately severe anemia The Post Assessment Physician Evaluation (RYAN) found the current functional status to be comparable with the Pre-admission Screening. The Patient is a good candidate for acute inpatient rehabilitation. Nothing since the Pre-admission screen has changed that determination. Rehabilitation Plan The patient has shown the ability to tolerate and benefit from 3 hours of therapy daily and is being admitted to a comprehensive acute inpatient rehabilitation program consisting of at least 3 hours of combined physical and occupational therapies. Begin intensive Physical Therapy for a minimum of 1.5 hours a day, at least 5 out of 7 days per week to address bed mobility, transfers, ambulation, strengthening, balance, and endurance. Begin intensive Occupational Therapy for a minimum of 1.5 hours a day, at least 5 out of 7 days per week to address ADL ( bathing, LE dressing, toileting) and adaptive equipment as needed. The patient will also require 24-hour skilled rehabilitation nursing for bowel and bladder management, skin care for decubitus ulcer prevention , pain management and ongoing medication administration The patient may benefit from a psychology consult for depression, adjustment disorder. . 
Continue daily physician medical management: 
Pneumonia prophylaxis- Incentive spirometer every hour while awake Chronic hypoxic respir failure; cont RT txs. On Trilogy at [facility].  Monitor O2 sats and cont O2 supplementation. Currently on 3L per NC 
 
DVT risk / DVT Prophylaxis- Will require daily physician exam to assess for signs and symptoms as patient is at increased risk for of thromboembolism. Mobilization as tolerated. Intermittent pneumatic compression devices when in bed Thigh-high or knee-high thromboembolic deterrent hose when out of bed. No further anticoag is needed; on therapeutic Lovenox while bridging to coumadin S/p mech AVR; chronic coumadin therapy but such hi risk of bleeding due to multiple falls. CHF; cont aldactone, coreg, imdur and lasix Pain Control: ongoing significant pain in knees which is stable and controlled by PRN meds. Will require regular pain assessment and comprenhensive pain management, Wound Care: Monitor wound status daily per staff and physician. At risk for failure. Will require 24/7 rehab nursing. Keep wound clean and dry, Reinforce dressing PRN and Ice to area for comfort Hypertension - BP uncontrolled, fluctuating, managed medically. Acute on chronic anemia; possible MDS followed by Dr Dada Jarrett. Monitor closely, sp transfusion. Cont epocrit. Cont ferrous sulfate and vit C Depression; cont Lexapro. Depression regarding medical cond and fear of losing independence Diabetes mellitus - Uncontrolled. poor glycemic control. Will require daily, close FSG monitoring and medication adjustment to optimize glycemic control in setting of acute illness and hospitalization. Urinary retention/ neurogenic bladder - schedule voids q6-8 hrs. Check post-void residual as needed; In and Out catheterize if post-void residual is more than 400 cc. CKD stg 3; creat 2.05 on admission to rehab. Was 1.55 yesterday. Recheck in a.m and review meds contributing. bowel program - add bowel program prn GERD - add PPI. At times may need additional antacids, Maalox prn.  Previous hx of GIB 
 
 
 The patient's prognosis for significant practical improvement within a reasonable period of time appears good and the estimated length of stay is 14 days and patient is expected to return home with spouse and continued rehabilitation with home health therapy. Given the patient's complex neurologic/medical condition and the risk of further medical complications including: DVT, PE, skin breakdown, pneumonia due to decreased mobility,  
infection at knee drainage site , CVA, MI, cardiac arrhythmias due to HTN, increased risk of thromboembolism secondary to decreased blood volume and increased energy expenditure in a patient with known acute blood loss could potentially impact the IRF program with decreased cardiovascular efficiency, postural hypotension and cardiac arrhythmia. For these ongoing medical issues, rehabilitation services could not be safely provided at a lower level of care such as a skilled nursing facility or nursing home. Signed By: Maryann Scott MD   
 September 11, 2018

## 2018-09-11 NOTE — DISCHARGE SUMMARY
Hospitalist Discharge Summary Admit Date:  2018  5:55 PM  
Name:  Aguilar Patel Age:  77 y.o. 
:  1950 MRN:  [id number] PCP:  Dyan Homans, MD 
Treatment Team: Attending Provider: Jaja Gutierrez MD; Consulting Provider: Robert Singleton NP; Surgeon: Aniceto Ureña MD; Consulting Provider: Iain Harper; Care Manager: Tia Vincent Problem List for this Hospitalization: 
Hospital Problems as of 2018  Date Reviewed: 2018 Codes Class Noted - Resolved POA Coagulopathy ([facility] INR >4 on admission 18 ICD-10-CM: D68.9 ICD-9-CM: 286.9  2018 - Present Yes Traumatic hematoma of left knee ICD-10-CM: J93.74FB ICD-9-CM: 924.11  2018 - Present Yes Debility ICD-10-CM: R53.81 ICD-9-CM: 799.3  2018 - Present Yes * (Principal)Anemia ICD-10-CM: D64.9 ICD-9-CM: 285.9  2018 - Present Yes Chronic respiratory failure with hypoxia (HCC) (Chronic) ICD-10-CM: J96.11 
ICD-9-CM: 518.83, 799.02  2018 - Present Yes Acute kidney injury superimposed on chronic kidney disease ([facility] ICD-10-CM: N17.9, N18.9 ICD-9-CM: 866.00, 585.9  2018 - Present Yes Obesity, morbid ([facility] ICD-10-CM: E66.01 
ICD-9-CM: 278.01  2018 - Present Yes S/P AVR (aortic valve replacement) (Chronic) ICD-10-CM: Z95.2 ICD-9-CM: V43.3  Unknown - Present Yes Overview Signed 2016 11:04 AM by Kevin Abreu [employer] 
  
  
   
 ICD (implantable cardioverter-defibrillator) in place ICD-10-CM: Z95.810 ICD-9-CM: V45.02  10/2/2014 - Present Yes Overview Signed 10/2/2014  4:58 PM by Donice Favre III [employer] single-chamber ICD implantation 10/20/14 Diabetes mellitus type 2, diet-controlled (HCC) (Chronic) ICD-10-CM: E11.9 ICD-9-CM: 250.00  2014 - Present Yes COPD (chronic obstructive pulmonary disease) (HCC) (Chronic) ICD-10-CM: J44.9 ICD-9-CM: 236  4/2/2014 - Present Yes  
   
 REJI (obstructive sleep apnea)-cpap (Chronic) ICD-10-CM: L77.36 
ICD-9-CM: 327.23  4/2/2014 - Present Yes  
   
 CKD (chronic kidney disease) stage 3, GFR 30-59 ml/min (Chronic) ICD-10-CM: N18.3 ICD-9-CM: 585.3  7/10/2013 - Present Yes Anticoagulated on Coumadin (Chronic) ICD-10-CM: Z51.81, Z79.01 
ICD-9-CM: V58.83, V58.61  7/9/2013 - Present Yes Overview Signed 7/9/2013  4:16 PM by Rishi Lorenzana NP  
  S/P AVR 
  
  
   
 CAD (coronary artery disease) (Chronic) ICD-10-CM: I25.10 ICD-9-CM: 414.00  1/20/2013 - Present Yes Overview Signed 5/20/2014 10:05 AM by Janiya Shields NP  
  5/8/14 PCI LAD with stent placed Chronic combined systolic and diastolic heart failure (HCC) (Chronic) ICD-10-CM: I50.42 
ICD-9-CM: 428.42  1/20/2013 - Present Yes Overview Addendum 4/28/2018  4:53 AM by Geovanni Aden MD  
  5/8/14 ECHO:  EF 10-15% 12/2017:  EF 25-30% Essential hypertension, benign (Chronic) ICD-10-CM: I10 
ICD-9-CM: 401.1  1/20/2013 - Present Yes  
   
 MDS (myelodysplastic syndrome) (HCC) (Chronic) ICD-10-CM: D46.9 ICD-9-CM: 238.75  12/17/2011 - Present Yes Overview Addendum 8/29/2013 11:06 AM by Delores Valentin Procrit started in August, 2011 12/18/11 Procrit weekly and Iron stores. 5-12 12-13-12 good response to 3 weekly procrit did not need it last time 3-7-13 Pt doing well. Just wanted a \"check-up. \" Responding to Procrit every three weeks. 8-29-13 patient has missed some injections on recently restarted hemoglobin only issue , takes oral iron iron stores each time Admission HPI from 9/7/2018:   
\" Please refer to HPI for more details  \".  
 
Hospital Course: 
 
78 y/o lady with possible MDS, HTN, DM 2, CAD s/p stent, Chronic systolic CHF s/p AICD, REJI on Trilogy at night, Mechanical Aortic valve on Coumadin, Chronic Hypoxemic Respiratory Failure on home O2, presented to the ED to be evaluated for enemia. In the ED hb was 6.5 and 2 PRBC were ordered. Patient was admitted for Acute on Chronic Anemia, FELIZ on CKD stage 3 and Supratherapeutic INR. She c/o pain on the left knee where a big hematoma was visible. Surgery evaluated patient who recommended INR to be at least less than 2 for L knee debridement of Bullae. Patient went for surgical procedure on 59/02 with no complications reported. Patient to be bridge coumadin/lovenox until INR is therapeutic, last INR: 1.5. Patient is stable to be discharged to 9th floor for PT. Cr went back to baseline, FELIZ likely secondary to anemia and dehydration. Follow up instructions below. Plan was discussed with patient/daughter/careteam.  All questions answered. Patient was stable at time of discharge and was instructed to call or return if there are any concerns or recurrence of symptoms. Diagnostic Imaging/Tests: All Micro Results Procedure Component Value Units Date/Time INFLUENZA A & B AG (RAPID TEST) [[id number]] Collected:  09/07/18 2139 Order Status:  Completed Specimen:  Nasopharyngeal from Nasal washing Updated:  09/07/18 2205 Influenza A Ag NEGATIVE      
   NEGATIVE FOR THE PRESENCE OF INFLUENZA A ANTIGEN 
INFECTION DUE TO INFLUENZA A CANNOT BE RULED OUT. BECAUSE THE ANTIGEN PRESENT IN THE SAMPLE MAY BE BELOW 
THE DETECTION LIMIT OF THE TEST. A NEGATIVE TEST IS PRESUMPTIVE AND IT IS RECOMMENDED THAT THESE RESULTS BE CONFIRMED BY VIRAL CULTURE OR AN FDA-CLEARED INFLUENZA A AND B MOLECULAR ASSAY. Influenza B Ag NEGATIVE      
   NEGATIVE FOR THE PRESENCE OF INFLUENZA B ANTIGEN 
INFECTION DUE TO INFLUENZA B CANNOT BE RULED OUT. BECAUSE THE ANTIGEN PRESENT IN THE SAMPLE MAY BE BELOW 
THE DETECTION LIMIT OF THE TEST. A NEGATIVE TEST IS PRESUMPTIVE AND IT IS RECOMMENDED THAT THESE RESULTS BE CONFIRMED BY VIRAL CULTURE OR AN FDA-CLEARED INFLUENZA A AND B MOLECULAR ASSAY. Source NASOPHARYNGEAL Labs: Results:  
   
BMP, Mg, Phos Recent Labs  
   09/11/18 
 0359  09/10/18 
 0447  09/09/18 
 0459 NA  142  140  142  
K  4.4  4.6  5.0  
CL  92*  91*  98  
CO2  42*  42*  38* AGAP  8  7  6*  
BUN  62*  55*  55* CREA  2.05*  1.55*  1.56* CA  9.7  9.8  9.6 GLU  120*  100  [address] Recent Labs  
   09/11/18 
 0359  09/10/18 
 0447  09/09/18 
 0459 WBC  8.4  8.2  7.0  
RBC  3.28*  3.26*  3.15* HGB  9.5*  9.6*  9.0*  
HCT  30.4*  30.7*  30.1*  
PLT  215  213  193 GRANS  60  68  66 LYMPH  28  20  21 EOS  2  2  2 MONOS  10  9  10 BASOS  0  0  0 IG  0  0  0 ANEU  5.0  5.5  4.7 ABL  2.3  1.7  1.5 ARVIND  0.2  0.2  0.1 ABM  0.8  0.8  0.7 ABB  0.0  0.0  0.0 AIG  0.0  0.0  0.0  
  
LFT No results for input(s): SGOT, ALT, TBIL, AP, TP, ALB, GLOB, AGRAT, GPT in the last 72 hours. Cardiac Testing Lab Results Component Value Date/Time  02/15/2017 01:09 PM  
  02/16/2016 06:04 AM  
  06/02/2014 04:15 AM  
 B-type Natriuretic Peptide 250.7 (H) 02/25/2016 03:38 PM  
 CK 47 05/15/2014 07:46 AM  
 CK 54 04/14/2014 02:40 PM  
 CK 55 01/20/2013 11:15 AM  
 CK - MB 0.9 02/18/2010 04:27 AM  
 CK - MB <0.5 02/17/2010 10:30 PM  
 CK-MB Index 2.9 (H) 02/18/2010 04:27 AM  
 CK-MB Index CANNOT BE CALCULATED 02/17/2010 10:30 PM  
 Troponin-I <0.05 01/31/2012 03:32 PM  
 Troponin-I, Qt. 0.08 (H) 12/01/2016 02:20 AM  
 Troponin-I, Qt. 0.07 (H) 02/16/2016 06:04 AM  
 Troponin-I, Qt. 0.25 (H) 05/09/2014 02:55 AM  
  
Coagulation Tests Lab Results Component Value Date/Time Prothrombin time 17.1 (H) 09/11/2018 03:59 AM  
 Prothrombin time 21.5 (H) 09/10/2018 04:47 AM  
 Prothrombin time 28.5 (H) 09/09/2018 04:59 AM  
 INR 1.5 09/11/2018 03:59 AM  
 INR 2.0 09/10/2018 04:47 AM  
 INR 2.8 09/09/2018 04:59 AM  
 aPTT 33.8 (H) 12/01/2016 02:20 AM  
 aPTT 46.2 (H) 10/02/2014 01:15 PM  
 aPTT 34.5 (H) 06/01/2014 10:00 AM  
  
A1c Lab Results Component Value Date/Time Hemoglobin A1c 5.7 (H) 08/21/2018 11:57 AM  
 Hemoglobin A1c 5.8 (H) 03/27/2018 09:16 AM  
 Hemoglobin A1c 6.1 (H) 11/28/2017 10:48 AM  
 Hemoglobin A1c, External 6.4 06/09/2015 Lipid Panel Lab Results Component Value Date/Time Cholesterol, total 133 08/21/2018 11:57 AM  
 HDL Cholesterol 63 08/21/2018 11:57 AM  
 LDL, calculated 59 08/21/2018 11:57 AM  
 VLDL, calculated 11 08/21/2018 11:57 AM  
 Triglyceride 54 08/21/2018 11:57 AM  
 CHOL/HDL Ratio 2.7 02/16/2017 05:15 AM  
  
Thyroid Panel Lab Results Component Value Date/Time TSH 3.060 11/28/2017 10:48 AM  
 TSH 1.980 11/30/2016 11:21 PM  
    
Most Recent UA Lab Results Component Value Date/Time Color YELLOW 05/07/2018 10:42 AM  
 Appearance CLEAR 05/07/2018 10:42 AM  
 Specific gravity 1.011 05/07/2018 10:42 AM  
 pH (UA) 6.0 05/07/2018 10:42 AM  
 Protein NEGATIVE  05/07/2018 10:42 AM  
 Glucose NEGATIVE  05/07/2018 10:42 AM  
 Ketone NEGATIVE  05/07/2018 10:42 AM  
 Bilirubin NEGATIVE  05/07/2018 10:42 AM  
 Blood NEGATIVE  05/07/2018 10:42 AM  
 Urobilinogen 1.0 05/07/2018 10:42 AM  
 Nitrites NEGATIVE  05/07/2018 10:42 AM  
 Leukocyte Esterase NEGATIVE  05/07/2018 10:42 AM  
  
 
Allergies Allergen Reactions  Sulfa (Sulfonamide Antibiotics) Hives  Aspirin Nausea Only Cannot take uncoated aspirin Immunization History Administered Date(s) Administered  Influenza High Dose Vaccine PF 10/01/2016, 09/01/2017  Influenza Vaccine 01/01/2013, 10/01/2014, 09/01/2015  Pneumococcal Conjugate (PCV-13) 07/17/2015  Pneumococcal Vaccine (Unspecified Type) 01/01/2013  TB Skin Test (PPD) Intradermal 05/26/2014, 04/29/2018, 09/09/2018 All Labs from Last 24 Hrs: 
Recent Results (from the past 24 hour(s)) GLUCOSE, POC Collection Time: 09/10/18  4:26 PM  
Result Value Ref Range Glucose (POC) 157 (H) 65 - 100 mg/dL GLUCOSE, POC  Collection Time: 09/10/18  9:00 PM  
 Result Value Ref Range Glucose (POC) 115 (H) 65 - 100 mg/dL CBC WITH AUTOMATED DIFF Collection Time: 09/11/18  3:59 AM  
Result Value Ref Range WBC 8.4 4.3 - 11.1 K/uL  
 RBC 3.28 (L) 4.05 - 5.2 M/uL HGB 9.5 (L) 11.7 - 15.4 g/dL HCT 30.4 (L) 35.8 - 46.3 % MCV 92.7 79.6 - 97.8 FL  
 MCH 29.0 26.1 - 32.9 PG  
 MCHC 31.3 (L) 31.4 - 35.0 g/dL  
 RDW 15.4 % PLATELET 273 728 - 189 K/uL MPV 10.1 9.4 - 12.3 FL ABSOLUTE NRBC 0.00 0.0 - 0.2 K/uL  
 DF AUTOMATED NEUTROPHILS 60 43 - 78 % LYMPHOCYTES 28 13 - 44 % MONOCYTES 10 4.0 - 12.0 % EOSINOPHILS 2 0.5 - 7.8 % BASOPHILS 0 0.0 - 2.0 % IMMATURE GRANULOCYTES 0 0.0 - 5.0 %  
 ABS. NEUTROPHILS 5.0 1.7 - 8.2 K/UL  
 ABS. LYMPHOCYTES 2.3 0.5 - 4.6 K/UL  
 ABS. MONOCYTES 0.8 0.1 - 1.3 K/UL  
 ABS. EOSINOPHILS 0.2 0.0 - 0.8 K/UL  
 ABS. BASOPHILS 0.0 0.0 - 0.2 K/UL  
 ABS. IMM. GRANS. 0.0 0.0 - 0.5 K/UL METABOLIC PANEL, BASIC Collection Time: 09/11/18  3:59 AM  
Result Value Ref Range Sodium 142 136 - 145 mmol/L Potassium 4.4 3.5 - 5.1 mmol/L Chloride 92 (L) 98 - 107 mmol/L  
 CO2 42 (HH) 21 - 32 mmol/L Anion gap 8 7 - 16 mmol/L Glucose 120 (H) 65 - 100 mg/dL BUN 62 (H) 8 - 23 MG/DL Creatinine 2.05 (H) 0.6 - 1.0 MG/DL  
 GFR est AA 31 (L) >60 ml/min/1.73m2 GFR est non-AA 25 (L) >60 ml/min/1.73m2 Calcium 9.7 8.3 - 10.4 MG/DL PROTHROMBIN TIME + INR Collection Time: 09/11/18  3:59 AM  
Result Value Ref Range Prothrombin time 17.1 (H) 11.5 - 14.5 sec INR 1.5 GLUCOSE, POC Collection Time: 09/11/18  7:42 AM  
Result Value Ref Range Glucose (POC) 117 (H) 65 - 100 mg/dL PLEASE READ & DOCUMENT PPD TEST IN 48 HRS Collection Time: 09/11/18  9:27 AM  
Result Value Ref Range PPD  neggative Negative  
 mm Induration  0 mm GLUCOSE, POC Collection Time: 09/11/18 11:02 AM  
Result Value Ref Range Glucose (POC) 155 (H) 65 - 100 mg/dL Discharge Exam: Patient Vitals for the past 24 hrs: 
 Temp Pulse Resp BP SpO2  
09/11/18 1109 98.8 °F (37.1 °C) 75 17 124/67 92 % 09/11/18 0756 99.6 °F (37.6 °C) 92 16 119/70 100 % 09/11/18 0731 - - - - 95 % 09/11/18 0350 98.8 °F (37.1 °C) 75 12 119/42 93 % 09/10/18 2314 98.2 °F (36.8 °C) 79 18 106/75 92 % 09/10/18 2006 99.4 °F (37.4 °C) 78 18 105/49 94 % 09/10/18 1914 - - - - 94 % 09/10/18 1520 98.6 °F (37 °C) 73 20 118/59 92 % 09/10/18 1330 - - - - 95 % Oxygen Therapy O2 Sat (%): 92 % (09/11/18 1109) Pulse via Oximetry: 80 beats per minute (09/11/18 0731) O2 Device: Nasal cannula (09/11/18 0731) O2 Flow Rate (L/min): 3 l/min (09/11/18 0731) FIO2 (%): 32 % (09/11/18 0422) Intake/Output Summary (Last 24 hours) at 09/11/18 1257 Last data filed at 09/11/18 1109 Gross per 24 hour Intake              716 ml Output             1200 ml Net             -484 ml Patient stated is feeling \"alright\". Patient reported having some discomfort around \" small ball\" in her L knee. Patient denies SOB, abdominal pain or any other complaints. General:    Well nourished. Alert. No distress. Eyes:   Normal sclera. Extraocular movements intact. ENT:  Normocephalic, atraumatic. Moist mucous membranes CV:   Regular rate and rhythm. No  rub or gallop. Lungs:  Clear to auscultation bilaterally. Abdomen: Soft, nontender, nondistended. Bowel sounds normal.  
Extremities: LLE with small superficial bullae below the knee. Minimal tenderness. No erythema or tenderness. S/p drainage later on AM. Neurologic: CN II-XII grossly intact. Sensation intact. Psych:  Normal mood and affect. Discharge Info:  
Current Discharge Medication List  
  
START taking these medications Details  
enoxaparin (LOVENOX) 100 mg/mL 100 mg by SubCUTAneous route every twenty-four (24) hours. Qty: 1 Syringe, Refills: 0 CONTINUE these medications which have NOT CHANGED Details spironolactone (ALDACTONE) 25 mg tablet Take 1 Tab by mouth two (2) times a day. Pill bottles states 3.5 daily but patient is taking one in am and one in pm 
Qty: 180 Tab, Refills: 3  
  
carvedilol (COREG) 3.125 mg tablet Take 2 Tabs by mouth two (2) times daily (with meals). Qty: 180 Tab, Refills: 3 Associated Diagnoses: Subdural hematoma ([address] Anticoagulated on Coumadin; Closed nondisplaced fracture of shaft of fifth metacarpal bone of left hand, initial encounter; Cardiomyopathy, unspecified type ([address] CKD (chronic kidney disease) stage 3, GFR 30-59 ml/min; Chronic combined systolic and diastolic heart failure ([address] Essential hypertension, benign; Chronic obstructive pulmonary disease, unspecified COPD type ([address] REJI (obstructive sleep apnea); Mixed hyperlipidemia; S/P AVR (aortic valve replacement); Pulmonary hypertension ([address] Type 2 diabetes mellitus with nephropathy ([address] Closed nondisplaced fracture of shaft of fifth metacarpal bone of left hand, sequela; Iron deficiency anemia due to chronic blood loss; MDS (myelodysplastic syndrome) ([address] Coronary artery disease involving native coronary artery of native heart without angina pectoris; Diabetes mellitus type 2, diet-controlled ([address] ICD (implantable cardioverter-defibrillator) in place; Osteopenia, unspecified location; Osteoarthritis of shoulder, unspecified laterality, unspecified osteoarthritis type; Dysthymia  
  
traMADol (ULTRAM) 50 mg tablet TAKE 1 TABLET BY MOUTH EVERY 4 HOURS AS NEEDED Qty: 180 Tab, Refills: 1 Comments: Not to exceed 4 additional fills before 03/07/2018 Associated Diagnoses: Osteoarthritis of shoulder, unspecified laterality, unspecified osteoarthritis type  
  
sacubitril-valsartan (ENTRESTO) 24 mg/26 mg tablet Take 1 Tab by mouth two (2) times a day. INDICATIONS: CHRONIC HEART FAILURE Qty: 60 Tab, Refills: 6  
  
multivit-minerals/folic acid (ADULT ONE DAILY MULTIVITAMIN PO) Take 1 Tab by mouth daily. aspirin delayed-release 81 mg tablet Take 81 mg by mouth daily. !! warfarin (COUMADIN) 5 mg tablet Take 1 Tab by mouth five (5) days a week. Qty: 1 Tab, Refills: 0  
  
!! warfarin (COUMADIN) 2.5 mg tablet Take 1 Tab by mouth two (2) days a week. Qty: 1 Tab, Refills: 0  
  
ferrous gluconate 324 mg (38 mg iron) tablet TAKE 1 TAB BY MOUTH DAILY (BEFORE BREAKFAST). INDICATIONS: IRON DEFICIENCY ANEMIA Qty: 90 Tab, Refills: 3 Associated Diagnoses: Iron deficiency anemia due to chronic blood loss  
  
cholecalciferol, VITAMIN D3, (VITAMIN D3) 5,000 unit tab tablet Take 1 Tab by mouth daily. Qty: 90 Tab, Refills: 4 OXYGEN-AIR DELIVERY SYSTEMS 3 L by Nasal route continuous. simvastatin (ZOCOR) 20 mg tablet TAKE 1 TABLET BY MOUTH EVERY DAY Qty: 90 Tab, Refills: 0 Comments: FAXED Associated Diagnoses: Mixed hyperlipidemia  
  
isosorbide mononitrate ER (IMDUR) 30 mg tablet TAKE 1 TAB BY MOUTH EVERY MORNING. Qty: 90 Tab, Refills: 3 Associated Diagnoses: Chronic combined systolic and diastolic heart failure ([address] furosemide (LASIX) 40 mg tablet Take 2 Tabs by mouth two (2) times a day. Qty: 120 Tab, Refills: 6 Associated Diagnoses: Essential hypertension, benign  
  
escitalopram oxalate (LEXAPRO) 20 mg tablet TAKE 1 TAB BY MOUTH DAILY. INDICATIONS: ANXIETY WITH DEPRESSION Qty: 90 Tab, Refills: 4 Associated Diagnoses: Dysthymia  
  
calcium citrate 200 mg (950 mg) tablet Take 200 mg by mouth two (2) times a day. ascorbic acid, vitamin C, (VITAMIN C) 500 mg tablet Take 500 mg by mouth daily. fluticasone (FLONASE) 50 mcg/actuation nasal spray 2 Sprays by Both Nostrils route daily as needed. Qty: 3 Bottle, Refills: 3  
  
umeclidinium (INCRUSE ELLIPTA) 62.5 mcg/actuation inhaler Take 1 Puff by inhalation daily. Indications: j44.9 Qty: 1 Inhaler, Refills: 11  
  
loratadine (CLARITIN) 10 mg tablet TAKE 1 TAB BY MOUTH DAILY. Qty: 30 Tab, Refills: 1 Associated Diagnoses: Upper respiratory tract infection, unspecified type  
  
mometasone-formoterol (DULERA) 200-5 mcg/actuation HFA inhaler 2 puffs bid, rinse mouth after use. Qty: 1 Inhaler, Refills: 11  
 Associated Diagnoses: Pulmonary hypertension ([address] Chronic respiratory failure with hypoxia (HCC)  
  
albuterol (PROVENTIL VENTOLIN) 2.5 mg /3 mL (0.083 %) nebulizer solution 3 mL by Nebulization route every four (4) hours as needed for Wheezing. Qty: 120 Each, Refills: 11  
 Associated Diagnoses: Pulmonary hypertension ([address] Chronic respiratory failure with hypoxia (HCC)  
  
albuterol (VENTOLIN HFA) 90 mcg/actuation inhaler 2 puffs qid prn 
Qty: 1 Inhaler, Refills: 11  
 Associated Diagnoses: Pulmonary hypertension ([address] Chronic respiratory failure with hypoxia (HCC)  
  
acetaminophen (TYLENOL) 325 mg tablet Take 650 mg by mouth every four (4) hours as needed for Pain. nitroglycerin (NITROSTAT) 0.4 mg SL tablet 0.4 mg by SubLINGual route every five (5) minutes as needed. !! - Potential duplicate medications found. Please discuss with provider. Disposition: SNF Activity: PT/OT Eval and Treat Diet: Cardiac Diet Follow-up Information Follow up With Details Comments Contact Info MD Rodrigo Ramos [address] [facility] [address] 
[phone] Signed: Macy Estevez MD

## 2018-09-11 NOTE — PROGRESS NOTES
Dual skin assessment with Divya Gutierrez RN. Healing bruise on lower back and left leg from fall at home. Allevyn on sacrum for protection. No pressure sores noted.

## 2018-09-11 NOTE — PROGRESS NOTES
TRANSFER - OUT REPORT: 
 
Verbal report given to Sarah(name) on  Company  being transferred to [facility](unit) for routine progression of care Report consisted of patients Situation, Background, Assessment and  
Recommendations(SBAR). Information from the following report(s) SBAR, Kardex and Recent Results was reviewed with the receiving nurse. Opportunity for questions and clarification was provided. Patient transported with: 
 O2 @ 3 liters

## 2018-09-11 NOTE — PROGRESS NOTES
Problem: Mobility Impaired (Adult and Pediatric) Goal: *Acute Goals and Plan of Care (Insert Text) 1. Ms. Freddy Perrin will perform supine to sit and sit to supine independently in 7 days. 2.  Ms. Freddy Perrin will perform sit to stand and bed to chair independently in 7 days. 3.  Ms. Freddy Perrin will perform gait with least restrictive device 200 ft independently in 7 days. 4.  Ms. Freddy Perrin will perform therex x 25 reps in sitting and standing in 7 days. PHYSICAL THERAPY: Daily Note, Treatment Day: 1st, PM 9/11/2018 INPATIENT: Hospital Day: 5 [place] / Product Type: Medicare /  
  
NAME/AGE/GENDER: Shasha Rios is a 77 y.o. female PRIMARY DIAGNOSIS: Anemia Anemia Anemia ICD-10: Treatment Diagnosis:  
 · Generalized Muscle Weakness (M62.81) · Difficulty in walking, Not elsewhere classified (R26.2) · History of falling (Z91.81) Precaution/Allergies: 
Sulfa (sulfonamide antibiotics) and Aspirin ASSESSMENT:  
 
Ms. Freddy Perrin presents supine in the bed with her daughter at the bedside, both agreeable with therapy. She reports she is feeling better today. She states that getting the blister on her leg taken care of makes her leg feel better with less pain and pressure. She remains on 3L of O2 with her seated sats at 92%. She participated in BLE AROM exercises in supine 2 x 10 reps. Supine to sit was min/mod assist with good sitting balance but mod/max assist to scoot to the edge of the bed. She complained of being light headed upon sitting and stated that she needs her coumadin started again. Sit to stand was min assist with use of the r/walker for BUE support secondary to increased dizziness with sitting and standing. She stepped in place 10 steps with CGA using the r/walker. She sat, rested then stood a 2nd time and ambulated with the r/walker 18 steps with CGA around the room/bed over to the recliner chair.   She was seated and positioned for comfort and set up with her lunch tray. Her daughter was still at the bedside at the end of the treatment. Patient appears to be making progress and feeling better. This section established at most recent assessment PROBLEM LIST (Impairments causing functional limitations): 1. Decreased Strength 2. Decreased Transfer Abilities 3. Decreased Ambulation Ability/Technique 4. Increased Pain 5. Decreased Activity Tolerance INTERVENTIONS PLANNED: (Benefits and precautions of physical therapy have been discussed with the patient.) 1. Bed Mobility 2. Gait Training 3. Therapeutic Activites 4. Therapeutic Exercise/Strengthening 5. Transfer Training TREATMENT PLAN: Frequency/Duration: 4 times a week for duration of hospital stay Rehabilitation Potential For Stated Goals: Good RECOMMENDED REHABILITATION/EQUIPMENT: (at time of discharge pending progress): Due to the probability of continued deficits (see above) this patient will likely need continued skilled physical therapy after discharge. Equipment:  
? None at this time HISTORY:  
History of Present Injury/Illness (Reason for Referral): 
  
Ms. Garret Ambrocio is a 78 yo female with PMH of probable MDS followed by hematology Dr. Ryan Gaitan, CKD 3, s/dCHF with ICD - EF 20-25%, chronic hypoxic respiratory failure on 3 L home O2, nocturnal trilogy use for REJI, DM2, HTN, and mechanical AVR on coumadin evaluated with acute on chronic anemia. She typically has monthly procrit injections and intermittent PRBC transfusions for worsening anemia. Had Bone marrow biopsy that was nondiagnostic however unable to rule out lower grade MDS. She denies oly GI blood loss and I am not able to locate GI scope reports. Current HGB 6.5 and 2 units PRBC ordered per ED. She did sustain a fall in the bathroom several weeks ago with subsequent bruising and blisters to LE Past Medical History/Comorbidities: Ms. Diaz  has a past medical history of Anticoagulated on Coumadin (2013); CAD (coronary artery disease) (2013); Cardiomyopathy [facility]); CHF (congestive heart failure) ([facility] (2017); CKD (chronic kidney disease) stage 3, GFR 30-59 ml/min (7/10/2013); COPD (chronic obstructive pulmonary disease) ([facility] (2014); Essential hypertension, benign (2013); HLD (hyperlipidemia); ICD (implantable cardioverter-defibrillator) in place (10/2/2014); Iron deficiency anemia due to chronic blood loss (2009); MDS (myelodysplastic syndrome) ([facility] (2011); REJI (obstructive sleep apnea)-cpap (2014); Osteoarthritis; Osteopenia; Quadrantanopia; and Visual complaint (2015). She also has no past medical history of Contact dermatitis and other eczema, due to unspecified cause; Difficult intubation; Malignant hyperthermia due to anesthesia; Nausea & vomiting; Pseudocholinesterase deficiency; or Unspecified adverse effect of anesthesia. Ms. Diaz  has a past surgical history that includes cardiac catheterization (); ir bx bone marrow diagnostic (2011); hx aortic valve replacement (, ); hx  section; hx tubal ligation; hx heart catheterization (); hx coronary stent placement (May, 2014); hx pacemaker (10/2/2014); and hx endoscopy (2009). Social History/Living Environment:  
Home Environment: Apartment # Steps to Enter: 3 One/Two Story Residence: One story Living Alone: Yes Support Systems: Family member(s) Patient Expects to be Discharged to[de-identified] Rehabilitation facility Current DME Used/Available at Home: Oxygen, portable, Shower chair, Raised toilet seat, Walker, rolling Tub or Shower Type: Tub/Shower combination Prior Level of Function/Work/Activity: 
Independent with aide 4 days a week, home o2 
age, repeated falls Number of Personal Factors/Comorbidities that affect the Plan of Care: 1-2: MODERATE COMPLEXITY EXAMINATION:  
 Most Recent Physical Functioning:  
Gross Assessment: 
  
         
  
Posture: 
  
Balance: 
Sitting: Intact Standing: Impaired Standing - Static: Fair;Constant support Standing - Dynamic : Fair Bed Mobility: 
Supine to Sit: Moderate assistance (using bed controls and rails to assist  Scooting is hard ) Wheelchair Mobility: 
  
Transfers: 
Sit to Stand: Minimum assistance Stand to Sit: Setup;Minimum assistance Bed to Chair: Minimum assistance Gait: 
  
Base of Support: Widened Speed/Winifred: Slow Step Length: Left shortened;Right shortened Distance (ft): 18 Feet (ft) (10 steps in place, rested then stood and ambulated 18' in ro) Assistive Device: Walker, rolling Ambulation - Level of Assistance: Contact guard assistance Interventions: Safety awareness training;Verbal cues Body Structures Involved: 1. Muscles Body Functions Affected: 1. Movement Related Activities and Participation Affected: 1. Mobility Number of elements that affect the Plan of Care: 3: MODERATE COMPLEXITY CLINICAL PRESENTATION:  
Presentation: Evolving clinical presentation with changing clinical characteristics: MODERATE COMPLEXITY CLINICAL DECISION MAKING:  
[employer] [facility] 6 Clicks Basic Mobility Inpatient Short Form How much difficulty does the patient currently have. .. Unable A Lot A Little None 1. Turning over in bed (including adjusting bedclothes, sheets and blankets)? [] 1   [] 2   [x] 3   [] 4  
2. Sitting down on and standing up from a chair with arms ( e.g., wheelchair, bedside commode, etc.)   [] 1   [] 2   [x] 3   [] 4  
3. Moving from lying on back to sitting on the side of the bed? [] 1   [] 2   [x] 3   [] 4 How much help from another person does the patient currently need. .. Total A Lot A Little None 4. Moving to and from a bed to a chair (including a wheelchair)? [] 1   [] 2   [x] 3   [] 4  
5. Need to walk in hospital room?    [] 1   [] 2   [x] 3   [] 4  
 6.  Climbing 3-5 steps with a railing? [] 1   [x] 2   [] 3   [] 4  
© 2007, Trustees of [address] [place] [address] [address] [address], under license to [employer]. All rights reserved Score:  Initial: 17 Most Recent: X (Date: -- ) Interpretation of Tool:  Represents activities that are increasingly more difficult (i.e. Bed mobility, Transfers, Gait). Score 24 23 22-20 19-15 14-10 9-7 6 Modifier CH CI CJ CK CL CM CN   
 
? Mobility - Walking and Moving Around:  
  - CURRENT STATUS: CK - 40%-59% impaired, limited or restricted  - GOAL STATUS: CK - 40%-59% impaired, limited or restricted  - D/C STATUS:  ---------------To be determined--------------- [place] / [place] B / Product Type: Medicare /   
 
Medical Necessity:    
· Patient is expected to demonstrate progress in functional technique to increase independence with mobility and gait. . 
Reason for Services/Other Comments: 
· Patient continues to require present interventions due to patient's inability to function at baseline. .  
Use of outcome tool(s) and clinical judgement create a POC that gives a: Questionable prediction of patient's progress: MODERATE COMPLEXITY  
  
 
 
 
TREATMENT:  
(In addition to Assessment/Re-Assessment sessions the following treatments were rendered) Pre-treatment Symptoms/Complaints:  Patient complained of light headedness upon sitting and standing. Pain: Initial:  
   Post Session:  0?10 Therapeutic Activity: (  26 minutes): Therapeutic activities including Bed transfers, Chair transfers, Ambulation on level ground and BLE AROM exercises to improve mobility, strength and balance. Required minimal Safety awareness training;Verbal cues to promote static and dynamic balance in standing. Date: 
9/9 Date: 
9/11 Date: Activity/Exercise Parameters Parameters Parameters AP/ toe ups 20 2 x 10 Knee extension 10 Seated 2 x 10   
marching 20 Seated 2 x 10   
 Adductor squeezes 20 Seated 2 x 10 Braces/Orthotics/Lines/Etc:  
· O2 Device: Nasal cannula - 3L with O2 sats at 92% Treatment/Session Assessment:   
· Response to Treatment:  Patient participated and tolerated therapy well today. · Interdisciplinary Collaboration:  
o Registered Nurse · After treatment position/precautions:  
o Up in chair 
o Call light within reach · Compliance with Program/Exercises: Will assess as treatment progresses. · Recommendations/Intent for next treatment session: \"Next visit will focus on advancements to more challenging activities and reduction in assistance provided\". Total Treatment Duration: PT Patient Time In/Time Out Time In: 8483 Time Out: 1246 [place], [place]

## 2018-09-11 NOTE — PROGRESS NOTES
Patient has been accepted by the 9th floor for rehab and they have a bed ready for her today. Notified Dr. Trenton Arreola. He is planning to discharge her today. CM notified 9th floor that patient will be coming today.

## 2018-09-11 NOTE — IP AVS SNAPSHOT
[address] 
[phone] Patient: Kandi Almaraz MRN: [id number] DJQ:6[birth year] About your hospitalization You were admitted on:  September 11, 2018 You last received care in the:  [address] You were discharged on:  September 23, 2018 Why you were hospitalized Your primary diagnosis was:  Physical Debility Your diagnoses also included:  Mds (Myelodysplastic Syndrome) (Hcc), Cad (Coronary Artery Disease), Chronic Combined Systolic And Diastolic Heart Failure (Hcc), Essential Hypertension, Benign, Anticoagulated On Coumadin, Ckd (Chronic Kidney Disease) Stage 3, Gfr 30-59 Ml/Min, Copd (Chronic Obstructive Pulmonary Disease) (Hcc), Jamal (Obstructive Sleep Apnea), Diabetes Mellitus Type 2, Diet-Controlled (Hcc), Type 2 Diabetes Mellitus With Nephropathy (Hcc), Cardiomyopathy (Hcc) Follow-up Information Follow up With Details Comments Contact Shelbi Collins MD On 10/3/2018 appointment  at 2:00 with DOROTA Rosario [address] [facility] [address] 
[phone] [address] [place] [address]  They will call you within 24 hours of discharge. [address] [facility] [address] [facility] [address] 
[phone] Annette Yee MD  NEXT AVAILABLE APPT [address] [facility] [address] [facility] [address] 
[phone] Alex Mcclain MD On 9/28/2018 appointment at 8:00 AM Rodrigo [address] [facility] [address] 
[phone] Tariq Reinoso MD On 10/24/2018 appointment at 9:50 AM with [address] 
[address] [facility] [address] 
[phone] Your Scheduled Appointments Friday September 28, 2018  8:00 AM EDT HOSPITAL FOLLOW-UP with Alex Mcclain MD  
[facility] ([address] [facility]) [address] 
[address] [place] [facility]  
[phone] Monday October 01, 2018  8:40 AM EDT  
REMOTE DEVICE CHECK ORDERS ONLY ENCOUNTER with NAIMA REMOTE AICD 62 [facility] CARDIOLOGY [place] OFFICE ([address] [facility]) [address] [place]  
[address] Mario Jain 81  
[phone] Please remember THIS REMOTE CHECK IS COMPLETED FROM HOME - YOU WILL NOT COME TO THE OFFICE FOR THIS APPOINTMENT. In preparation for this check, please ensure your monitor box is working appropriately. If your monitor requires you to send a transmission, please make sure it is sent by 11:00AM on this day so we can have it processed and resulted to your doctor without delay. If you have a question or problem with the monitor box, please contact your respective company:   [address] [place] [address]/[employer] - [phone]  [employer]/Home [employer] - [phone]  [employer] - [phone]  [employer] - [phone]  If you have any further questions or need to move this appointment, we are happy to help and can be reached at [phone]. Wednesday October 03, 2018  2:00 PM EDT Extended Office Visit with Luis Benitez NP [facility] ([facility]) [address] Yue Camacho [facility] [address] [phone] Wednesday October 10, 2018 11:15 AM EDT New Patient with MD Santosh Wakefield MD ([address]) [address] [facility] [address] [facility] [address]  
[phone] Wednesday October 24, 2018 10:20 AM EDT  
(Arrive by 9:50 AM) HOSPITAL with Deana Livingston NP [facility] ([facility]) [address] [place] [address] [facility]  
[phone] Discharge Orders None A check suzanne indicates which time of day the medication should be taken. My Medications START taking these medications  Instructions Each Dose to Equal  
 Morning Noon Evening Bedtime  
 traZODone 50 mg tablet Commonly known as:  Scott Morin Your last dose was: Your next dose is: Take 0.5-1 Tabs by mouth nightly. 25-50 mg CHANGE how you take these medications Instructions Each Dose to Equal  
 Morning Noon Evening Bedtime  
 carvedilol 6.25 mg tablet Commonly known as:  Geri Coronado What changed:  medication strength Your last dose was: Your next dose is: Take 1 Tab by mouth two (2) times daily (with meals). 6.25 mg  
    
   
   
   
  
 ferrous gluconate 324 mg (38 mg iron) tablet What changed:   
- when to take this 
- additional instructions Your last dose was: Your next dose is: TAKE 1 TAB BY MOUTH DAILY (BEFORE BREAKFAST). INDICATIONS: IRON DEFICIENCY ANEMIA  
     
   
   
   
  
 furosemide 40 mg tablet Commonly known as:  LASIX What changed:   
- how much to take - when to take this Your last dose was: Your next dose is: Take 1 Tab by mouth daily. 40 mg  
    
   
   
   
  
 loratadine 10 mg tablet Commonly known as:  Aysha Raymundo What changed:  See the new instructions. Your last dose was: Your next dose is: TAKE 1 TAB BY MOUTH DAILY. warfarin 6 mg tablet Commonly known as:  COUMADIN What changed:   
- medication strength 
- how much to take - when to take this - Another medication with the same name was removed. Continue taking this medication, and follow the directions you see here. Your last dose was: Your next dose is: Take 1 Tab by mouth every evening. Indications: THROMBOEMBOLISM DUE TO PROSTHETIC HEART VALVES  
 6 mg CONTINUE taking these medications Instructions Each Dose to Equal  
 Morning Noon Evening Bedtime  
 acetaminophen 325 mg tablet Commonly known as:  TYLENOL  
   
 Your last dose was: Your next dose is: Take 650 mg by mouth every four (4) hours as needed for Pain. 650 mg  
    
   
   
   
  
 ADULT ONE DAILY MULTIVITAMIN PO Your last dose was: Your next dose is: Take 1 Tab by mouth daily. 1 Tab * albuterol 2.5 mg /3 mL (0.083 %) nebulizer solution Commonly known as:  PROVENTIL VENTOLIN Your last dose was: Your next dose is:    
   
   
 3 mL by Nebulization route every four (4) hours as needed for Wheezing. 2.5 mg  
    
   
   
   
  
 * albuterol 90 mcg/actuation inhaler Commonly known as:  VENTOLIN HFA Your last dose was: Your next dose is:    
   
   
 2 puffs qid prn  
     
   
   
   
  
 ascorbic acid (vitamin C) 500 mg tablet Commonly known as:  VITAMIN C Your last dose was: Your next dose is: Take 500 mg by mouth daily. 500 mg  
    
   
   
   
  
 aspirin delayed-release 81 mg tablet Your last dose was: Your next dose is: Take 81 mg by mouth daily. 81 mg  
    
   
   
   
  
 calcium citrate 200 mg (950 mg) tablet Your last dose was: Your next dose is: Take 200 mg by mouth two (2) times a day. 200 mg  
    
   
   
   
  
 cholecalciferol (VITAMIN D3) 5,000 unit Tab tablet Commonly known as:  VITAMIN D3 Your last dose was: Your next dose is: Take 1 Tab by mouth daily. 5000 Units  
    
   
   
   
  
 escitalopram oxalate 20 mg tablet Commonly known as:  Orlando Ogden Your last dose was: Your next dose is: TAKE 1 TAB BY MOUTH DAILY. INDICATIONS: ANXIETY WITH DEPRESSION  
     
   
   
   
  
 fluticasone 50 mcg/actuation nasal spray Commonly known as:  Twila George Your last dose was: Your next dose is: 2 Sprays by Both Nostrils route daily as needed. 2 [place] isosorbide mononitrate ER 30 mg tablet Commonly known as:  IMDUR Your last dose was: Your next dose is: TAKE 1 TAB BY MOUTH EVERY MORNING. mometasone-formoterol 200-5 mcg/actuation HFA inhaler Commonly known as:  Rickey Host Your last dose was: Your next dose is:    
   
   
 2 puffs bid, rinse mouth after use. nitroglycerin 0.4 mg SL tablet Commonly known as:  NITROSTAT Your last dose was: Your next dose is: 0.4 mg by SubLINGual route every five (5) minutes as needed. 0.4 mg OXYGEN-AIR DELIVERY SYSTEMS Your last dose was: Your next dose is:    
   
   
 3 L by Nasal route continuous. 3 L  
    
   
   
   
  
 sacubitril-valsartan 24-26 mg tablet Commonly known as:  ENTRESTO Your last dose was: Your next dose is: Take 1 Tab by mouth two (2) times a day. INDICATIONS: CHRONIC HEART FAILURE  
 1 Tab  
    
   
   
   
  
 simvastatin 20 mg tablet Commonly known as:  ZOCOR Your last dose was: Your next dose is: TAKE 1 TABLET BY MOUTH EVERY DAY  
     
   
   
   
  
 spironolactone 25 mg tablet Commonly known as:  ALDACTONE Your last dose was: Your next dose is: Take 1 Tab by mouth two (2) times a day. Pill bottles states 3.5 daily but patient is taking one in am and one in pm  
 25 mg  
    
   
   
   
  
 traMADol 50 mg tablet Commonly known as:  ULTRAM  
   
Your last dose was: Your next dose is: TAKE 1 TABLET BY MOUTH EVERY 4 HOURS AS NEEDED  
     
   
   
   
  
 umeclidinium 62.5 mcg/actuation inhaler Commonly known as:  INCRUSE ELLIPTA Your last dose was: Your next dose is: Take 1 Puff by inhalation daily. Indications: j44.9  
 1 Puff * Notice: This list has 2 medication(s) that are the same as other medications prescribed for you. Read the directions carefully, and ask your doctor or other care provider to review them with you. STOP taking these medications   
 enoxaparin 100 mg/mL Commonly known as:  LOVENOX Where to Get Your Medications These medications were sent to Tom Richardson, [address] [facility]  [address] [facility], [place] [address] Phone:  [phone]  
  carvedilol 6.25 mg tablet  
 furosemide 40 mg tablet  
 traZODone 50 mg tablet  
 warfarin 6 mg tablet Opioid Education Prescription Opioids: What You Need to Know: 
 
 
After general anesthesia or intravenous sedation, for 24 hours or while taking prescription Narcotics: · Limit your activities · Do not drive and operate hazardous machinery · Do not make important personal or business decisions · Do  not drink alcoholic beverages · If you have not urinated within 8 hours after discharge, please contact your surgeon on call. Report the following to your surgeon: 
· Excessive pain, swelling, redness or odor of or around the surgical area · Temperature over 100.5 · Nausea and vomiting lasting longer than 4 hours or if unable to take medications · Any signs of decreased circulation or nerve impairment to extremity: change in color, persistent  numbness, tingling, coldness or increase pain · Any questions What to do at Home: 
Recommended activity: {discharge activity:52225}, *** 
 
 If you experience any of the following symptoms ***, please follow up with ***. *  Please give a list of your current medications to your Primary Care Provider. *  Please update this list whenever your medications are discontinued, doses are 
    changed, or new medications (including over-the-counter products) are added. *  Please carry medication information at all times in case of emergency situations. These are general instructions for a healthy lifestyle: No smoking/ No tobacco products/ Avoid exposure to second hand smoke Surgeon General's Warning:  Quitting smoking now greatly reduces serious risk to your health. Obesity, smoking, and sedentary lifestyle greatly increases your risk for illness A healthy diet, regular physical exercise & weight monitoring are important for maintaining a healthy lifestyle You may be retaining fluid if you have a history of heart failure or if you experience any of the following symptoms:  Weight gain of 3 pounds or more overnight or 5 pounds in a week, increased swelling in our hands or feet or shortness of breath while lying flat in bed. Please call your doctor as soon as you notice any of these symptoms; do not wait until your next office visit. Recognize signs and symptoms of STROKE: 
 
F-face looks uneven A-arms unable to move or move unevenly S-speech slurred or non-existent T-time-call 911 as soon as signs and symptoms begin-DO NOT go Back to bed or wait to see if you get better-TIME IS BRAIN. Warning Signs of HEART ATTACK Call 911 if you have these symptoms: 
? Chest discomfort. Most heart attacks involve discomfort in the center of the chest that lasts more than a few minutes, or that goes away and comes back. It can feel like uncomfortable pressure, squeezing, fullness, or pain. ? Discomfort in other areas of the upper body. Symptoms can include pain or discomfort in one or both arms, the back, neck, jaw, or stomach. ? Shortness of breath with or without chest discomfort. ? Other signs may include breaking out in a cold sweat, nausea, or lightheadedness. Don't wait more than five minutes to call [address]! Fast action can save your life. Calling 911 is almost always the fastest way to get lifesaving treatment. Emergency Medical Services staff can begin treatment when they arrive  up to an hour sooner than if someone gets to the hospital by car. The discharge information has been reviewed with the {PATIENT PARENT GUARDIAN:20162}. The {PATIENT PARENT GUARDIAN:86135} verbalized understanding. Discharge medications reviewed with the {Dishcarge meds reviewed ROKB:10489} and appropriate educational materials and side effects teaching were provided. ___________________________________________________________________________________________________________________________________ Biziblehart Announcement We are excited to announce that we are making your provider's discharge notes available to you in Prime Focus. You will see these notes when they are completed and signed by the physician that discharged you from your recent hospital stay. If you have any questions or concerns about any information you see in Prime Focus, please call the Health Information Department where you were seen or reach out to your Primary Care Provider for more information about your plan of care. Introducing [facility]! [address] introduces Prime Focus patient portal. Now you can access parts of your medical record, email your doctor's office, and request medication refills online. 1. In your internet browser, go to [email or web address] 2. Click on the First Time User? Click Here link in the Sign In box. You will see the New Member Sign Up page. 3. Enter your Prime Focus Access Code exactly as it appears below. You will not need to use this code after youve completed the sign-up process.  If you do not sign up before the expiration date, you must request a new code. · Kopo Kopo Access Code: [email or web address] Expires: 10/17/2018  8:55 AM 
 
4. Enter the last four digits of your Social Security Number (xxxx) and Date of Birth (mm/dd/yyyy) as indicated and click Submit. You will be taken to the next sign-up page. 5. Create a Kopo Kopo ID. This will be your Kopo Kopo login ID and cannot be changed, so think of one that is secure and easy to remember. 6. Create a Kopo Kopo password. You can change your password at any time. 7. Enter your Password Reset Question and Answer. This can be used at a later time if you forget your password. 8. Enter your e-mail address. You will receive e-mail notification when new information is available in [address] 9. Click Sign Up. You can now view and download portions of your medical record. 10. Click the Download Summary menu link to download a portable copy of your medical information. If you have questions, please visit the Frequently Asked Questions section of the Kopo Kopo website. Remember, Kopo Kopo is NOT to be used for urgent needs. For medical emergencies, dial 911. Now available from your iPhone and Android! Introducing Shaan Roger As a [facility] patient, I wanted to make you aware of our electronic visit tool called Shaan Kana. [facility] 24/7 allows you to connect within minutes with a medical provider 24 hours a day, seven days a week via a mobile device or tablet or logging into a secure website from your computer. You can access Shaan Roger from anywhere in the [place].  
 
A virtual visit might be right for you when you have a simple condition and feel like you just dont want to get out of bed, or cant get away from work for an appointment, when your regular [facility] provider is not available (evenings, weekends or holidays), or when youre out of town and need minor care. Electronic visits cost only $49 and if the [email or web address] 24/7 provider determines a prescription is needed to treat your condition, one can be electronically transmitted to a nearby pharmacy*. Please take a moment to enroll today if you have not already done so. The enrollment process is free and takes just a few minutes. To enroll, please download the Edyn gama to your tablet or phone, or visit [email or web address] to enroll on your computer. And, as an [address] [place] [address] patient with a [email or web address] account, the results of your visits will be scanned into your electronic medical record and your primary care provider will be able to view the scanned results. We urge you to continue to see your regular [facility] provider for your ongoing medical care. And while your primary care provider may not be the one available when you seek a Trendalyticserenfin virtual visit, the peace of mind you get from getting a real diagnosis real time can be priceless. For more information on Reframe It, view our Frequently Asked Questions (FAQs) at [email or web address]. Sincerely, 
 
Royal Morales MD 
Chief Medical Officer [address] *:  certain medications cannot be prescribed via Reframe It Providers Seen During Your Hospitalization Provider Specialty Primary office phone Chris Stewart MD Physical Medicine [facility] Rehab [phone] Immunizations Administered for This Admission Name Date Influenza Vaccine (Quad) PF  Deferred () Your Primary Care Physician (PCP) Primary Care Physician Office Phone Office Fax Carlene Marc [phone] You are allergic to the following Allergen Reactions Sulfa (Sulfonamide Antibiotics) Hives Aspirin Nausea Only Cannot take uncoated aspirin Recent Documentation Weight BMI OB Status Smoking Status 97.3 kg 39.23 kg/m2 Postmenopausal Former Smoker Emergency Contacts Name Discharge Info Relation Home Work Mobile Teodoro Llanes DISCHARGE CAREGIVER [3] Daughter [21] [phone] Patient Belongings The following personal items are in your possession at time of discharge: 
  Dental Appliances: None  Visual Aid: Glasses, With patient      Home Medications: None   Jewelry: None  Clothing:  (dtr to bring)    Other Valuables:  (Trilogy and 1 oxygen tank) Please provide this summary of care documentation to your next provider. Signatures-by signing, you are acknowledging that this After Visit Summary has been reviewed with you and you have received a copy. Patient Signature:  ____________________________________________________________ Date:  ____________________________________________________________  
  
My Staple Provider Signature:  ____________________________________________________________ Date:  ____________________________________________________________

## 2018-09-11 NOTE — PROGRESS NOTES
END OF SHIFT NOTE: 
 
Intake/Output 
09/11 0701 - 09/11 1900 In: 476 [P.O.:476] Out: -   
Voiding: YES Catheter: YES Drain:  
External Female Catheter 09/09/18 (Active) Site Assessment Clean, dry, & intact 9/10/2018 10:00 PM  
Repositioned Yes 9/10/2018 10:00 PM  
Perineal Care Yes 9/10/2018 10:00 PM  
Wick Changed Yes 9/10/2018  5:15 AM  
Suction Canister/Tubing Changed Yes 9/10/2018  5:15 AM  
Urine Output (mL) 300 ml 9/10/2018  8:06 PM  
 
 
 
 
 
 
Stool:  0 occurrences. Emesis:  0 occurrences. VITAL SIGNS Patient Vitals for the past 12 hrs: 
 Temp Pulse Resp BP SpO2  
09/11/18 0756 99.6 °F (37.6 °C) 92 16 119/70 100 % 09/11/18 0731 - - - - 95 % 09/11/18 0350 98.8 °F (37.1 °C) 75 12 119/42 93 % 09/10/18 2314 98.2 °F (36.8 °C) 79 18 106/75 92 % Pain Assessment Pain 1 Pain Scale 1: Numeric (0 - 10) (09/11/18 0825) Pain Intensity 1: 9 (09/11/18 0825) Patient Stated Pain Goal: 0 (09/10/18 1914) Pain Reassessment 1: Yes (09/10/18 1813) Pain Location 1: Leg;Knee;Buttocks (09/11/18 0825) Pain Orientation 1: Left (09/11/18 0825) Pain Description 1: Aching (09/11/18 0825) Pain Intervention(s) 1: Medication (see MAR) (09/11/18 0825) Ambulating No 
 
Additional Information: Pt slept well most of shift. VSS, no needs voiced at this time. Shift report given to oncoming nurse, Daniella Naranjo RN,  at the bedside. Kaylee Osorio

## 2018-09-11 NOTE — PROGRESS NOTES
TRANSFER - IN REPORT: 
  
Verbal report received from DEWAYNE Grant on [employer]  being received from [facility] 5th floor for routine progression of care Report consisted of patients Situation, Background, Assessment and  
Recommendations(SBAR). Information from the following report(s) SBAR, Kardex and Recent Results was reviewed with the receiving nurse. Opportunity for questions and clarification was provided. Assessment completed upon patients arrival to unit and care assumed.

## 2018-09-11 NOTE — PROGRESS NOTES
H&P/Consult Note/Progress Note/Office Note:  
Camila Ramirez  MRN: [id number]  GZK:0/9/[birth year]15  Age:68 y.o. General Surgery Consult ordered by: Dr. Drake Farmer Reason for Consult:Eval left knee hematoma HPI: Camila Ramirez is a 77 y.o. female with PMH of probable MDS followed by hematology Dr. Kathie Robert, CKD 3, s/dCHF with ICD - EF 20-25%, chronic hypoxic respiratory failure on 3 L home O2, nocturnal trilogy use for REJI, DM2, HTN, and mechanical AVR on coumadin admitted by Hospitalist with acute on chronic anemia. She typically has monthly procrit injections and intermittent PRBC transfusions for worsening anemia. Had Bone marrow biopsy that was nondiagnostic however unable to rule out lower grade MDS. At the time of admission HGB 6.5 and 2 units PRBC given. Pt reports falling in the bathroom last Thursday with subsequent bruising and blisters x 2 to Left lower extremity. 9/7/18 Xray Left Knee Hx: Pain after falling Three views of the left knee were obtained 
  
FINDINGS:  There is pretibial soft tissue edema and swelling. There is no 
evidence of fracture or other acute bony abnormality. No bony lesions are seen. There is no joint effusion. 
  
IMPRESSION: No evidence of fracture 9/7/18: Head CT Hx:  Reason fall, increased confusion 
  
Multiple axial images obtained through the brain without intravenous contrast.  
Radiation dose reduction techniques were used for this study: All CT scans 
performed at this facility use one or all of the following: Automated exposure 
control, adjustment of the mA and/or kVp according to patient's size, iterative 
reconstruction. Compared with 04/28/2018. 
  
FINDINGS: There is stable bioccipital atrophy. There is also mild chronic 
change in the white matter. There is no CT evidence of acute hemorrhage or 
infarction. The ventricles are normal in size. There are no extra-axial fluid 
collections. No masses are seen. The sinuses are clear.  There are no bony lesions. 
  
IMPRESSION: No CT evidence of acute intracranial abnormality. 18: Resting in bed. C/o pain to Left knee and lower extremity. H/H 9.3/ 29.5. Creat 1.75. INR 3.4. 
18 No changes; coumadin on hold INR coming down; fall precautions 9/10/18 no changes; INR 2.0 this am 
18 no change; INR 1.5 Past Medical History:  
Diagnosis Date  Anticoagulated on Coumadin 2013 S/P AVR  CAD (coronary artery disease) 2013 PCI LAD with stent placed  Cardiomyopathy ([address]  CHF (congestive heart failure) ([facility] 2017  CKD (chronic kidney disease) stage 3, GFR 30-59 ml/min 7/10/2013  COPD (chronic obstructive pulmonary disease) ([facility] 2014  Essential hypertension, benign 2013  HLD (hyperlipidemia)  ICD (implantable cardioverter-defibrillator) in place 10/2/2014 Biotronik single-chamber ICD implantation 10/20/14  Iron deficiency anemia due to chronic blood loss 2009  MDS (myelodysplastic syndrome) ([facility] 2011 Procrit started in 11 Procrit weekly and Iron stores. 12 good response to 3 weekly procrit did not need it last time  3-13 Pt doing well. Just wanted a \"check-up. \" Responding to Procrit every three weeks. 13 patient has missed some injections on recently restarted hemoglobin only issue , takes oral iron iron stores each time  REJI (obstructive sleep apnea)-cpap 2014  Osteoarthritis  Osteopenia  Quadrantanopia  Visual complaint 2015 Past Surgical History:  
Procedure Laterality Date [address]    HX AORTIC VALVE REPLACEMENT  ,   
 mechanical valve   HX  SECTION    
 HX CORONARY STENT PLACEMENT  May, 2014 STEMI with Stent placement.  HX ENDOSCOPY  2009 EGD [address] CATHETERIZATION    HX PACEMAKER  10/2/2014 Biotronik ICD  HX TUBAL LIGATION    
  IR BX BONE MARROW DIAGNOSTIC  7/2011 Current Facility-Administered Medications Medication Dose Route Frequency  warfarin (COUMADIN) tablet 2.5 mg - On Hold  2.5 mg Oral See Admin Instructions  polyethylene glycol (MIRALAX) packet 17 g  17 g Oral DAILY PRN  
 traMADol (ULTRAM) tablet 50 mg  50 mg Oral Q4H PRN  
 morphine injection 2 mg  2 mg IntraVENous Q4H PRN  
 aspirin delayed-release tablet 81 mg  81 mg Oral DAILY  0.9% sodium chloride infusion 250 mL  250 mL IntraVENous PRN  
 albuterol (PROVENTIL VENTOLIN) nebulizer solution 2.5 mg  2.5 mg Nebulization Q4H PRN  
 ascorbic acid (vitamin C) (VITAMIN C) tablet 500 mg  500 mg Oral DAILY  carvedilol (COREG) tablet 6.25 mg  6.25 mg Oral BID WITH MEALS  escitalopram oxalate (LEXAPRO) tablet 20 mg  20 mg Oral DAILY  furosemide (LASIX) tablet 80 mg  80 mg Oral BID  isosorbide mononitrate ER (IMDUR) tablet 30 mg  30 mg Oral DAILY  simvastatin (ZOCOR) tablet 20 mg  20 mg Oral QHS  tiotropium (SPIRIVA) inhalation capsule 18 mcg  18 mcg Inhalation DAILY  sodium chloride (NS) flush 5-10 mL  5-10 mL IntraVENous Q8H  
 sodium chloride (NS) flush 5-10 mL  5-10 mL IntraVENous PRN  
 acetaminophen (TYLENOL) tablet 650 mg  650 mg Oral Q6H PRN  
 ondansetron (ZOFRAN) injection 4 mg  4 mg IntraVENous Q4H PRN  
 insulin lispro (HUMALOG) injection   SubCUTAneous AC&HS  influenza vaccine 2018-19 (6 mos+)(PF) (FLUARIX QUAD/FLULAVAL QUAD) injection 0.5 mL  0.5 mL IntraMUSCular PRIOR TO DISCHARGE  budesonide (PULMICORT) 500 mcg/2 ml nebulizer suspension  500 mcg Nebulization BID RT And  
 albuterol (PROVENTIL VENTOLIN) nebulizer solution 2.5 mg  2.5 mg Nebulization Q6HWA RT  
 
Sulfa (sulfonamide antibiotics) and Aspirin Social History Social History  Marital status:  Spouse name: N/A  
 Number of children: N/A  
 Years of education: N/A Occupational History   Retired  
  [employer] Social History Main Topics  Smoking status: Former Smoker Packs/day: 0.25 Years: 42.00 Types: Cigarettes Start date: 10/1/1956 Quit date: 5/1/2014  Smokeless tobacco: Never Used  Alcohol use No  
 Drug use: No  
 Sexual activity: Not Asked Other Topics Concern  Weight Concern Yes  Special Diet Yes Social History Narrative Lives with her daughter. Ambulates only short distances. History Smoking Status  Former Smoker  Packs/day: 0.25  
 Years: 42.00  Types: Cigarettes  Start date: 10/1/1956  Quit date: 5/1/2014 Smokeless Tobacco  
 Never Used Family History Problem Relation Age of Onset  Heart Disease Mother CHF  Hypertension Mother  Kidney Disease Mother  Heart Disease Father CHF  Lung Disease Father  Diabetes Father  Cancer Father   
  prostate  Hypertension Father  Heart Attack Father  Heart Disease Maternal Aunt  Diabetes Brother  Coronary Artery Disease Other  Breast Cancer Neg Hx   
 
ROS:  Comprehensive review of systems was otherwise unremarkable except as noted above. Physical Exam:  
Visit Vitals  /67 (BP 1 Location: Right arm, BP Patient Position: At rest)  Pulse 75  Temp 98.8 °F (37.1 °C)  Resp 17  Ht 5' 2\" (1.575 m)  Wt 212 lb 9.6 oz (96.4 kg) Comment: 212.6 lbs  SpO2 92%  BMI 38.89 kg/m2 Vitals:  
 09/11/18 0350 09/11/18 0731 09/11/18 0756 09/11/18 1109 BP: 119/42  119/70 124/67 Pulse: 75  92 75 Resp: 12  16 17 Temp: 98.8 °F (37.1 °C)  99.6 °F (37.6 °C) 98.8 °F (37.1 °C) SpO2: 93% 95% 100% 92% Weight:      
Height:      
 
09/11 0701 - 09/11 1900 In: 476 [P.O.:476] Out: 500 [Urine:500] 09/09 1901 - 09/11 0700 In: 600 [P.O.:600] Out: 2850 [Urine:2850] Constitutional: Alert, cooperative, no acute distress; appears stated age Eyes:Sclera are clear. EOMs intact ENMT: no external lesions gross hearing normal; no obvious neck masses, no ear or lip lesions, nares normal 
CV: RRR. Normal perfusion, Mild edema Resp: No JVD. Breathing is  non-labored; no audible wheezing. GI: soft, non-tender, non-distended Integument: diffuse bruising and fluid filled blister/epidermolysis x 2 to Left anterior shin/knee No cellulitis Musculoskeletal: unremarkable with normal function. No embolic signs or cyanosis. Neuro:  Oriented; moves all 4; no focal deficits Psychiatric: normal affect and mood, no memory impairment Recent vitals (if inpt): 
Patient Vitals for the past 24 hrs: 
 BP Temp Pulse Resp SpO2 Weight  
09/11/18 1109 124/67 98.8 °F (37.1 °C) 75 17 92 % -  
09/11/18 0756 119/70 99.6 °F (37.6 °C) 92 16 100 % -  
09/11/18 0731 - - - - 95 % -  
09/11/18 0350 119/42 98.8 °F (37.1 °C) 75 12 93 % -  
09/10/18 2314 106/75 98.2 °F (36.8 °C) 79 18 92 % -  
09/10/18 2006 105/49 99.4 °F (37.4 °C) 78 18 94 % -  
09/10/18 1959 - - - - - 212 lb 9.6 oz (96.4 kg) 09/10/18 1914 - - - - 94 % -  
09/10/18 1520 118/59 98.6 °F (37 °C) 73 20 92 % -  
09/10/18 1330 - - - - 95 % - Labs: 
Recent Labs  
   09/11/18 
 0359 WBC  8.4 HGB  9.5* PLT  215 NA  142  
K  4.4 CL  92* CO2  42*  
BUN  62* CREA  2.05* GLU  120* PTP  17.1* INR  1.5 Lab Results Component Value Date/Time  WBC 8.4 09/11/2018 03:59 AM  
 HGB 9.5 (L) 09/11/2018 03:59 AM  
 PLATELET 408 77/43/5399 03:59 AM  
 Sodium 142 09/11/2018 03:59 AM  
 Potassium 4.4 09/11/2018 03:59 AM  
 Chloride 92 (L) 09/11/2018 03:59 AM  
 CO2 42 (HH) 09/11/2018 03:59 AM  
 BUN 62 (H) 09/11/2018 03:59 AM  
 Creatinine 2.05 (H) 09/11/2018 03:59 AM  
 Glucose 120 (H) 09/11/2018 03:59 AM  
 INR 1.5 09/11/2018 03:59 AM  
 aPTT 33.8 (H) 12/01/2016 02:20 AM  
 Bilirubin, total 0.8 09/07/2018 05:52 PM  
 AST (SGOT) 11 (L) 09/07/2018 05:52 PM  
 ALT (SGPT) 17 09/07/2018 05:52 PM  
 Alk. phosphatase 48 (L) 09/07/2018 05:52 PM  
 Amylase 88 01/31/2012 03:12 PM  
 Lipase 96 04/14/2014 02:40 PM  
 Lactic acid 0.7 02/16/2016 06:16 AM  
 Troponin-I <0.05 01/31/2012 03:32 PM  
 Troponin-I, Qt. 0.08 (H) 12/01/2016 02:20 AM  
 
 
 
I reviewed recent labs and recent radiologic studies. I independently reviewed radiology images for studies I described above or studies I have ordered. Admission date (for inpatients): 9/7/2018 * No surgery found *  * No surgery found * ASSESSMENT/PLAN: 
Problem List  Date Reviewed: 8/31/2018 Codes Class Noted Coagulopathy ([facility] INR >4 on admission 9/7/18 ICD-10-CM: D68.9 ICD-9-CM: 286.9  9/8/2018 Traumatic hematoma of left knee ICD-10-CM: P49.03TM ICD-9-CM: 924.11  9/8/2018 Debility ICD-10-CM: R53.81 ICD-9-CM: 799.3  9/8/2018 * (Principal)Anemia ICD-10-CM: D64.9 ICD-9-CM: 285.9  9/7/2018 Chronic respiratory failure with hypoxia (HCC) (Chronic) ICD-10-CM: J96.11 
ICD-9-CM: 518.83, 799.02  9/7/2018 Acute kidney injury superimposed on chronic kidney disease ([facility] ICD-10-CM: N17.9, N18.9 ICD-9-CM: 866.00, 585.9  9/7/2018 Encounter for immunization ICD-10-CM: I20 ICD-9-CM: V03.89  8/21/2018 Obesity, morbid ([facility] ICD-10-CM: E66.01 
ICD-9-CM: 278.01  6/8/2018 Physical debility ICD-10-CM: R53.81 ICD-9-CM: 799.3  5/2/2018 Closed nondisplaced fracture of shaft of fifth metacarpal bone of left hand ICD-10-CM: D10.669F ICD-9-CM: 815.03  4/28/2018 Subdural hematoma (HCC) ICD-10-CM: I62.00 ICD-9-CM: 432.1  4/27/2018 Long term (current) use of anticoagulants (Chronic) ICD-10-CM: Z79.01 
ICD-9-CM: V58.61  4/19/2018 Dysthymia ICD-10-CM: F34.1 ICD-9-CM: 300.4  4/5/2018 Type 2 diabetes mellitus with nephropathy (HCC) (Chronic) ICD-10-CM: E11.21 
ICD-9-CM: 250.40, 583.81  12/18/2017 Pulmonary hypertension (HCC) (Chronic) ICD-10-CM: I27.20 ICD-9-CM: 416.8  6/15/2016 S/P AVR (aortic valve replacement) (Chronic) ICD-10-CM: Z95.2 ICD-9-CM: V43.3  Unknown Overview Signed 2/23/2016 11:04 AM by Jennifer Hernandez Mechanical/Artific Cardiomyopathy ([facility] (Chronic) ICD-10-CM: I42.9 ICD-9-CM: 425.4  Unknown Osteopenia ICD-10-CM: M85.80 ICD-9-CM: 733.90  Unknown HLD (hyperlipidemia) (Chronic) ICD-10-CM: Y37.9 ICD-9-CM: 272.4  Unknown Osteoarthritis ICD-10-CM: M19.90 ICD-9-CM: 715.90  Unknown  
   
 ICD (implantable cardioverter-defibrillator) in place ICD-10-CM: Z95.810 ICD-9-CM: V45.02  10/2/2014 Overview Signed 10/2/2014  4:58 PM by Melanie Crisostomo III Bi[employer]ronik single-chamber ICD implantation 10/20/14 Diabetes mellitus type 2, diet-controlled (HCC) (Chronic) ICD-10-CM: E11.9 ICD-9-CM: 250.00  8/28/2014 COPD (chronic obstructive pulmonary disease) (HCC) (Chronic) ICD-10-CM: J44.9 ICD-9-CM: 519  4/2/2014 REJI (obstructive sleep apnea)-cpap (Chronic) ICD-10-CM: G47.33 
ICD-9-CM: 327.23  4/2/2014 CKD (chronic kidney disease) stage 3, GFR 30-59 ml/min (Chronic) ICD-10-CM: N18.3 ICD-9-CM: 585.3  7/10/2013 Anticoagulated on Coumadin (Chronic) ICD-10-CM: Z51.81, Z79.01 
ICD-9-CM: V58.83, V58.61  7/9/2013 Overview Signed 7/9/2013  4:16 PM by Rishi Lorenzana NP  
  S/P AVR 
  
  
   
 CAD (coronary artery disease) (Chronic) ICD-10-CM: I25.10 ICD-9-CM: 414.00  1/20/2013 Overview Signed 5/20/2014 10:05 AM by Janiya Shields NP  
  5/8/14 PCI LAD with stent placed Chronic combined systolic and diastolic heart failure (HCC) (Chronic) ICD-10-CM: I50.42 
ICD-9-CM: 428.42  1/20/2013 Overview Addendum 4/28/2018  4:53 AM by Geovanni Aden MD  
  5/8/14 ECHO:  EF 10-15% 12/2017:  EF 25-30% Essential hypertension, benign (Chronic) ICD-10-CM: I10 
ICD-9-CM: 401.1  1/20/2013 MDS (myelodysplastic syndrome) (HCC) (Chronic) ICD-10-CM: D46.9 ICD-9-CM: 238.75  12/17/2011 Overview Addendum 8/29/2013 11:06 AM by Elayne Eli Procrit started in August, 2011 12/18/11 Procrit weekly and Iron stores. 5-12 12-13-12 good response to 3 weekly procrit did not need it last time 3-7-13 Pt doing well. Just wanted a \"check-up. \" Responding to Procrit every three weeks. 8-29-13 patient has missed some injections on recently restarted hemoglobin only issue , takes oral iron iron stores each time Iron deficiency anemia due to chronic blood loss (Chronic) ICD-10-CM: D50.0 ICD-9-CM: 280.0  7/29/2009 Principal Problem: Anemia (9/7/2018) Active Problems: 
  MDS (myelodysplastic syndrome) ([facility] (12/17/2011) Overview: Procrit started in August, 2011 12/18/11 Procrit weekly and Iron stores. 5-12 12-13-12 good response to 3 weekly procrit did not need it last time 3-7-13 Pt doing well. Just wanted a \"check-up. \" Responding to Procrit  
    every three weeks. 8-29-13 patient has missed some injections on recently restarted  
    hemoglobin only issue , takes oral iron iron stores each time CAD (coronary artery disease) (1/20/2013) Overview: 5/8/14 PCI LAD with stent placed Chronic combined systolic and diastolic heart failure ([facility] (1/20/2013) Overview: 5/8/14 ECHO:  EF 10-15% 12/2017:  EF 25-30% Essential hypertension, benign (1/20/2013) Anticoagulated on Coumadin (7/9/2013) Overview: S/P AVR 
 
  CKD (chronic kidney disease) stage 3, GFR 30-59 ml/min (7/10/2013) COPD (chronic obstructive pulmonary disease) ([facility] (4/2/2014) REJI (obstructive sleep apnea)-cpap (4/2/2014) Diabetes mellitus type 2, diet-controlled ([facility] (8/28/2014) ICD (implantable cardioverter-defibrillator) in place (10/2/2014) Overview: Bi[employer]ronik single-chamber ICD implantation 10/20/14 S/P AVR (aortic valve replacement) () Overview: Mechanical/Artific Obesity, morbid ([address] (6/8/2018) Chronic respiratory failure with hypoxia ([address] (9/7/2018) Acute kidney injury superimposed on chronic kidney disease ([address] (9/7/2018) Coagulopathy ([address] INR >4 on admission 9/7/18 (9/8/2018) Traumatic hematoma of left knee (9/8/2018) Debility (9/8/2018) Plan: 
INR 3.4-->2.8-->2.0-->1.5, Pharmacy consulted for dosing with goal INR 2.5 to 3.5, followup daily INR Will follow left knee injury No cellulitis at present Will debride left knee bullae/epidermolysis at bedside today Debility/Falls-->Recommend SNF/rehab after hospitalization Morris Mcgowan NP I have seen and examined the patient with the Nurse Practitioner and agree with the above assessment and plan.   
 
Crys Foster MD

## 2018-09-11 NOTE — IP AVS SNAPSHOT
[address] 
[phone] Patient: Ryanne Leger MRN: [id number] PBL:6/4/0347 A check suzanne indicates which time of day the medication should be taken. My Medications START taking these medications Instructions Each Dose to Equal  
 Morning Noon Evening Bedtime  
 traZODone 50 mg tablet Commonly known as:  Angola Denis Your last dose was: Your next dose is: Take 0.5-1 Tabs by mouth nightly. 25-50 mg CHANGE how you take these medications Instructions Each Dose to Equal  
 Morning Noon Evening Bedtime  
 carvedilol 6.25 mg tablet Commonly known as:  Jaycee Gonzalez What changed:  medication strength Your last dose was: Your next dose is: Take 1 Tab by mouth two (2) times daily (with meals). 6.25 mg  
    
   
   
   
  
 ferrous gluconate 324 mg (38 mg iron) tablet What changed:   
- when to take this 
- additional instructions Your last dose was: Your next dose is: TAKE 1 TAB BY MOUTH DAILY (BEFORE BREAKFAST). INDICATIONS: IRON DEFICIENCY ANEMIA  
     
   
   
   
  
 furosemide 40 mg tablet Commonly known as:  LASIX What changed:   
- how much to take - when to take this Your last dose was: Your next dose is: Take 1 Tab by mouth daily. 40 mg  
    
   
   
   
  
 loratadine 10 mg tablet Commonly known as:  Latricia Blue What changed:  See the new instructions. Your last dose was: Your next dose is: TAKE 1 TAB BY MOUTH DAILY. warfarin 6 mg tablet Commonly known as:  COUMADIN What changed:   
- medication strength 
- how much to take - when to take this - Another medication with the same name was removed. Continue taking this medication, and follow the directions you see here. Your last dose was: Your next dose is: Take 1 Tab by mouth every evening. Indications: THROMBOEMBOLISM DUE TO PROSTHETIC HEART VALVES  
 6 mg CONTINUE taking these medications Instructions Each Dose to Equal  
 Morning Noon Evening Bedtime  
 acetaminophen 325 mg tablet Commonly known as:  TYLENOL Your last dose was: Your next dose is: Take 650 mg by mouth every four (4) hours as needed for Pain. 650 mg  
    
   
   
   
  
 ADULT ONE DAILY MULTIVITAMIN PO Your last dose was: Your next dose is: Take 1 Tab by mouth daily. 1 Tab * albuterol 2.5 mg /3 mL (0.083 %) nebulizer solution Commonly known as:  PROVENTIL VENTOLIN Your last dose was: Your next dose is:    
   
   
 3 mL by Nebulization route every four (4) hours as needed for Wheezing. 2.5 mg  
    
   
   
   
  
 * albuterol 90 mcg/actuation inhaler Commonly known as:  VENTOLIN HFA Your last dose was: Your next dose is:    
   
   
 2 puffs qid prn  
     
   
   
   
  
 ascorbic acid (vitamin C) 500 mg tablet Commonly known as:  VITAMIN C Your last dose was: Your next dose is: Take 500 mg by mouth daily. 500 mg  
    
   
   
   
  
 aspirin delayed-release 81 mg tablet Your last dose was: Your next dose is: Take 81 mg by mouth daily. 81 mg  
    
   
   
   
  
 calcium citrate 200 mg (950 mg) tablet Your last dose was: Your next dose is: Take 200 mg by mouth two (2) times a day. 200 mg  
    
   
   
   
  
 cholecalciferol (VITAMIN D3) 5,000 unit Tab tablet Commonly known as:  VITAMIN D3 Your last dose was: Your next dose is: Take 1 Tab by mouth daily. 5000 Units  
    
   
   
   
  
 escitalopram oxalate 20 mg tablet Commonly known as:  Eden Lemon Your last dose was: Your next dose is: TAKE 1 TAB BY MOUTH DAILY. INDICATIONS: ANXIETY WITH DEPRESSION  
     
   
   
   
  
 fluticasone 50 mcg/actuation nasal spray Commonly known as:  Verdie Distance Your last dose was: Your next dose is: 2 Sprays by Both Nostrils route daily as needed. 2 Spray  
    
   
   
   
  
 isosorbide mononitrate ER 30 mg tablet Commonly known as:  IMDUR Your last dose was: Your next dose is: TAKE 1 TAB BY MOUTH EVERY MORNING. mometasone-formoterol 200-5 mcg/actuation HFA inhaler Commonly known as:  Tobi Bamberger Your last dose was: Your next dose is:    
   
   
 2 puffs bid, rinse mouth after use. nitroglycerin 0.4 mg SL tablet Commonly known as:  NITROSTAT Your last dose was: Your next dose is: 0.4 mg by SubLINGual route every five (5) minutes as needed. 0.4 mg OXYGEN-AIR DELIVERY SYSTEMS Your last dose was: Your next dose is:    
   
   
 3 L by Nasal route continuous. 3 L  
    
   
   
   
  
 sacubitril-valsartan 24-26 mg tablet Commonly known as:  ENTRESTO Your last dose was: Your next dose is: Take 1 Tab by mouth two (2) times a day. INDICATIONS: CHRONIC HEART FAILURE  
 1 Tab  
    
   
   
   
  
 simvastatin 20 mg tablet Commonly known as:  ZOCOR Your last dose was: Your next dose is: TAKE 1 TABLET BY MOUTH EVERY DAY  
     
   
   
   
  
 spironolactone 25 mg tablet Commonly known as:  ALDACTONE Your last dose was: Your next dose is: Take 1 Tab by mouth two (2) times a day. Pill bottles states 3.5 daily but patient is taking one in am and one in pm  
 25 mg  
    
   
   
   
  
 traMADol 50 mg tablet Commonly known as:  ULTRAM  
   
Your last dose was: Your next dose is: TAKE 1 TABLET BY MOUTH EVERY 4 HOURS AS NEEDED  
     
   
   
   
  
 umeclidinium 62.5 mcg/actuation inhaler Commonly known as:  INCRUSE ELLIPTA Your last dose was: Your next dose is: Take 1 Puff by inhalation daily. Indications: j44.9  
 1 Puff * Notice: This list has 2 medication(s) that are the same as other medications prescribed for you. Read the directions carefully, and ask your doctor or other care provider to review them with you. STOP taking these medications   
 enoxaparin 100 mg/mL Commonly known as:  LOVENOX Where to Get Your Medications These medications were sent to [address], [place] [address] Phone:  [phone]  
  carvedilol 6.25 mg tablet  
 furosemide 40 mg tablet  
 traZODone 50 mg tablet  
 warfarin 6 mg tablet

## 2018-09-12 LAB
ANION GAP SERPL CALC-SCNC: 10 MMOL/L (ref 7–16)
BASOPHILS # BLD: 0 K/UL (ref 0–0.2)
BASOPHILS NFR BLD: 1 % (ref 0–2)
BUN SERPL-MCNC: 66 MG/DL (ref 8–23)
CALCIUM SERPL-MCNC: 10.4 MG/DL (ref 8.3–10.4)
CHLORIDE SERPL-SCNC: 92 MMOL/L (ref 98–107)
CO2 SERPL-SCNC: 39 MMOL/L (ref 21–32)
CREAT SERPL-MCNC: 2.35 MG/DL (ref 0.6–1)
DIFFERENTIAL METHOD BLD: ABNORMAL
EOSINOPHIL # BLD: 0.2 K/UL (ref 0–0.8)
EOSINOPHIL NFR BLD: 2 % (ref 0.5–7.8)
ERYTHROCYTE [DISTWIDTH] IN BLOOD BY AUTOMATED COUNT: 15.9 %
GLUCOSE BLD STRIP.AUTO-MCNC: 119 MG/DL (ref 65–100)
GLUCOSE BLD STRIP.AUTO-MCNC: 121 MG/DL (ref 65–100)
GLUCOSE BLD STRIP.AUTO-MCNC: 137 MG/DL (ref 65–100)
GLUCOSE BLD STRIP.AUTO-MCNC: 219 MG/DL (ref 65–100)
GLUCOSE SERPL-MCNC: 97 MG/DL (ref 65–100)
HCT VFR BLD AUTO: 33.8 % (ref 35.8–46.3)
HGB BLD-MCNC: 10.5 G/DL (ref 11.7–15.4)
IMM GRANULOCYTES # BLD: 0 K/UL (ref 0–0.5)
IMM GRANULOCYTES NFR BLD AUTO: 0 % (ref 0–5)
INR PPP: 1.3
INR PPP: 1.5
LYMPHOCYTES # BLD: 1.9 K/UL (ref 0.5–4.6)
LYMPHOCYTES NFR BLD: 24 % (ref 13–44)
MAGNESIUM SERPL-MCNC: 2.2 MG/DL (ref 1.8–2.4)
MCH RBC QN AUTO: 29.3 PG (ref 26.1–32.9)
MCHC RBC AUTO-ENTMCNC: 31.1 G/DL (ref 31.4–35)
MCV RBC AUTO: 94.4 FL (ref 79.6–97.8)
MONOCYTES # BLD: 0.7 K/UL (ref 0.1–1.3)
MONOCYTES NFR BLD: 9 % (ref 4–12)
NEUTS SEG # BLD: 5 K/UL (ref 1.7–8.2)
NEUTS SEG NFR BLD: 64 % (ref 43–78)
NRBC # BLD: 0 K/UL (ref 0–0.2)
PLATELET # BLD AUTO: 218 K/UL (ref 150–450)
PMV BLD AUTO: 10.3 FL (ref 9.4–12.3)
POTASSIUM SERPL-SCNC: 4.2 MMOL/L (ref 3.5–5.1)
PROTHROMBIN TIME: 15.8 SEC (ref 11.5–14.5)
PROTHROMBIN TIME: 17.1 SEC (ref 11.5–14.5)
RBC # BLD AUTO: 3.58 M/UL (ref 4.05–5.2)
SODIUM SERPL-SCNC: 141 MMOL/L (ref 136–145)
WBC # BLD AUTO: 7.8 K/UL (ref 4.3–11.1)

## 2018-09-12 PROCEDURE — 97110 THERAPEUTIC EXERCISES: CPT

## 2018-09-12 PROCEDURE — 74011000250 HC RX REV CODE- 250: Performed by: PHYSICAL MEDICINE & REHABILITATION

## 2018-09-12 PROCEDURE — 85610 PROTHROMBIN TIME: CPT

## 2018-09-12 PROCEDURE — 94640 AIRWAY INHALATION TREATMENT: CPT

## 2018-09-12 PROCEDURE — 97530 THERAPEUTIC ACTIVITIES: CPT

## 2018-09-12 PROCEDURE — 97162 PT EVAL MOD COMPLEX 30 MIN: CPT

## 2018-09-12 PROCEDURE — 94760 N-INVAS EAR/PLS OXIMETRY 1: CPT

## 2018-09-12 PROCEDURE — 65310000000 HC RM PRIVATE REHAB

## 2018-09-12 PROCEDURE — 97116 GAIT TRAINING THERAPY: CPT

## 2018-09-12 PROCEDURE — 97166 OT EVAL MOD COMPLEX 45 MIN: CPT

## 2018-09-12 PROCEDURE — 77010033678 HC OXYGEN DAILY

## 2018-09-12 PROCEDURE — 74011250636 HC RX REV CODE- 250/636: Performed by: PHYSICAL MEDICINE & REHABILITATION

## 2018-09-12 PROCEDURE — 74011250637 HC RX REV CODE- 250/637: Performed by: PHYSICAL MEDICINE & REHABILITATION

## 2018-09-12 PROCEDURE — 99232 SBSQ HOSP IP/OBS MODERATE 35: CPT | Performed by: PHYSICAL MEDICINE & REHABILITATION

## 2018-09-12 PROCEDURE — 83735 ASSAY OF MAGNESIUM: CPT

## 2018-09-12 PROCEDURE — 85025 COMPLETE CBC W/AUTO DIFF WBC: CPT

## 2018-09-12 PROCEDURE — 97535 SELF CARE MNGMENT TRAINING: CPT

## 2018-09-12 PROCEDURE — 80048 BASIC METABOLIC PNL TOTAL CA: CPT

## 2018-09-12 PROCEDURE — 74011636637 HC RX REV CODE- 636/637: Performed by: PHYSICAL MEDICINE & REHABILITATION

## 2018-09-12 PROCEDURE — 36415 COLL VENOUS BLD VENIPUNCTURE: CPT

## 2018-09-12 PROCEDURE — 82962 GLUCOSE BLOOD TEST: CPT

## 2018-09-12 RX ORDER — FUROSEMIDE 40 MG/1
40 TABLET ORAL 2 TIMES DAILY
Status: DISCONTINUED | OUTPATIENT
Start: 2018-09-12 | End: 2018-09-14

## 2018-09-12 RX ADMIN — INSULIN LISPRO 4 UNITS: 100 INJECTION, SOLUTION INTRAVENOUS; SUBCUTANEOUS at 21:30

## 2018-09-12 RX ADMIN — ESCITALOPRAM OXALATE 20 MG: 10 TABLET ORAL at 08:13

## 2018-09-12 RX ADMIN — ALBUTEROL SULFATE 2.5 MG: 2.5 SOLUTION RESPIRATORY (INHALATION) at 05:07

## 2018-09-12 RX ADMIN — BUDESONIDE 500 MCG: 0.5 INHALANT RESPIRATORY (INHALATION) at 05:07

## 2018-09-12 RX ADMIN — ISOSORBIDE MONONITRATE 30 MG: 30 TABLET, EXTENDED RELEASE ORAL at 08:13

## 2018-09-12 RX ADMIN — ENOXAPARIN SODIUM 100 MG: 100 INJECTION SUBCUTANEOUS at 17:31

## 2018-09-12 RX ADMIN — SIMVASTATIN 20 MG: 20 TABLET, FILM COATED ORAL at 21:30

## 2018-09-12 RX ADMIN — POLYETHYLENE GLYCOL 3350 17 G: 17 POWDER, FOR SOLUTION ORAL at 17:37

## 2018-09-12 RX ADMIN — TRAMADOL HYDROCHLORIDE 50 MG: 50 TABLET, FILM COATED ORAL at 08:46

## 2018-09-12 RX ADMIN — CARVEDILOL 6.25 MG: 6.25 TABLET, FILM COATED ORAL at 08:07

## 2018-09-12 RX ADMIN — ASPIRIN 81 MG: 81 TABLET, COATED ORAL at 08:13

## 2018-09-12 RX ADMIN — Medication 10 ML: at 17:09

## 2018-09-12 RX ADMIN — BUDESONIDE 500 MCG: 0.5 INHALANT RESPIRATORY (INHALATION) at 17:36

## 2018-09-12 RX ADMIN — FUROSEMIDE 80 MG: 40 TABLET ORAL at 08:13

## 2018-09-12 RX ADMIN — ALBUTEROL SULFATE 2.5 MG: 2.5 SOLUTION RESPIRATORY (INHALATION) at 17:36

## 2018-09-12 RX ADMIN — TIOTROPIUM BROMIDE 18 MCG: 18 CAPSULE ORAL; RESPIRATORY (INHALATION) at 07:56

## 2018-09-12 RX ADMIN — PANTOPRAZOLE SODIUM 40 MG: 40 TABLET, DELAYED RELEASE ORAL at 07:21

## 2018-09-12 RX ADMIN — FUROSEMIDE 40 MG: 40 TABLET ORAL at 17:31

## 2018-09-12 RX ADMIN — CARVEDILOL 6.25 MG: 6.25 TABLET, FILM COATED ORAL at 17:03

## 2018-09-12 RX ADMIN — OXYCODONE HYDROCHLORIDE 10 MG: 5 TABLET ORAL at 14:11

## 2018-09-12 RX ADMIN — Medication 5 ML: at 07:22

## 2018-09-12 RX ADMIN — TRAMADOL HYDROCHLORIDE 50 MG: 50 TABLET, FILM COATED ORAL at 21:33

## 2018-09-12 RX ADMIN — OXYCODONE HYDROCHLORIDE AND ACETAMINOPHEN 500 MG: 500 TABLET ORAL at 08:13

## 2018-09-12 RX ADMIN — WARFARIN SODIUM 5 MG: 5 TABLET ORAL at 17:31

## 2018-09-12 NOTE — PROGRESS NOTES
Problem: Falls - Risk of 
Goal: *Absence of Falls Document Yen Wilkins Fall Risk and appropriate interventions in the flowsheet. Outcome: Progressing Towards Goal 
Fall Risk Interventions: 
Mobility Interventions: Utilize walker, cane, or other assistive device Medication Interventions: Patient to call before getting OOB Elimination Interventions: Call light in reach History of Falls Interventions: Door open when patient unattended

## 2018-09-12 NOTE — PROGRESS NOTES
Ameya Ribeiro MD, Medical Director Marcel Schwartz [address], [address] [facility] Tel: [phone] SFD PROGRESS NOTE [address] Admit Date: 9/11/2018 Admit Diagnosis: debility; Physical debility Subjective Feeling well. No cp, sob, n/v. Did not sleep well. Doesn't really know why. Seems a bit confused Objective:  
 
Current Facility-Administered Medications Medication Dose Route Frequency  0.9% sodium chloride infusion 250 mL  250 mL IntraVENous PRN  
 acetaminophen (TYLENOL) tablet 650 mg  650 mg Oral Q6H PRN  
 sodium chloride (NS) flush 5-10 mL  5-10 mL IntraVENous Q8H  
 sodium chloride (NS) flush 5-10 mL  5-10 mL IntraVENous PRN  
 albuterol (PROVENTIL VENTOLIN) nebulizer solution 2.5 mg  2.5 mg Nebulization Q4H PRN  
 alum-mag hydroxide-simeth (MYLANTA) oral suspension 30 mL  30 mL Oral Q4H PRN  
 ascorbic acid (vitamin C) (VITAMIN C) tablet 500 mg  500 mg Oral DAILY  aspirin delayed-release tablet 81 mg  81 mg Oral DAILY  bisacodyl (DULCOLAX) suppository 10 mg  10 mg Rectal DAILY PRN  
 budesonide (PULMICORT) 500 mcg/2 ml nebulizer suspension  500 mcg Nebulization BID RT And  
 albuterol (PROVENTIL VENTOLIN) nebulizer solution 2.5 mg  2.5 mg Nebulization Q6HWA RT  
 carvedilol (COREG) tablet 6.25 mg  6.25 mg Oral BID WITH MEALS  enoxaparin (LOVENOX) injection 100 mg  100 mg SubCUTAneous Q24H  
 escitalopram oxalate (LEXAPRO) tablet 20 mg  20 mg Oral DAILY  furosemide (LASIX) tablet 80 mg  80 mg Oral BID  influenza vaccine 2018-19 (6 mos+)(PF) (FLUARIX QUAD/FLULAVAL QUAD) injection 0.5 mL  0.5 mL IntraMUSCular PRIOR TO DISCHARGE  insulin lispro (HUMALOG) injection   SubCUTAneous AC&HS  isosorbide mononitrate ER (IMDUR) tablet 30 mg  30 mg Oral DAILY  morphine 10 mg/mL injection 2 mg  2 mg IntraVENous Q4H PRN  
 ondansetron (ZOFRAN ODT) tablet 4 mg  4 mg Oral Q6H PRN  
  oxyCODONE IR (ROXICODONE) tablet 5-10 mg  5-10 mg Oral Q4H PRN  pantoprazole (PROTONIX) tablet 40 mg  40 mg Oral ACB  polyethylene glycol (MIRALAX) packet 17 g  17 g Oral DAILY PRN  
 simvastatin (ZOCOR) tablet 20 mg  20 mg Oral QHS  tiotropium (SPIRIVA) inhalation capsule 18 mcg  18 mcg Inhalation DAILY  traMADol (ULTRAM) tablet 50 mg  50 mg Oral Q4H PRN  
 warfarin (COUMADIN) tablet 5 mg  5 mg Oral QPM  
 
Review of Systems:Denies chest pain, shortness of breath, cough, headache,abdominal pain, dysurea, calf pain. Pertinent positives are as noted in the medical records and unremarkable otherwise.  
+ visual problems; blurred vision Visit Vitals  /53  Pulse 74  Temp 98.2 °F (36.8 °C)  Resp 18  SpO2 95% Physical Exam:  
General: Alert and age appropriately oriented. No acute cardio respiratory distress. HEENT: Normocephalic,no scleral icterus Oral mucosa moist without cyanosis Lungs: Clear to auscultation  Bilaterally. Decreased bases Respiration even and unlabored Heart: Regular rate and rhythm, S1, S2 No  murmurs, clicks, rub or gallops Abdomen: Soft, non-tender, nondistended. Bowel sounds present. No organomegaly. obese Genitourinary: defer Neuromuscular:  
 
 Generalized weakness prox> distal 
Decrease ROM right shoulder; chronic Skin/extremity: No rashes, no erythema. No calf tenderness BLE 
1 edema; left knee wound with serosang drainage; no jnt effusion [facility] Fall Risk Assessment: 
Doraine Ang Risk Mobility: Ambulates or transfers with assist devices or assistance (09/1950) Mobility Interventions: Utilize walker, cane, or other assistive device (09/1950) Mentation: Alert, oriented x 3 (09/1950) Medication: Patient receiving anticonvulsants, sedatives(tranquilizers), psychotropics or hypnotics, hypoglycemics, narcotics, sleep aids, antihypertensives, laxatives, or diuretics (09/1950) Medication Interventions: Patient to call before getting OOB (09/1950) Elimination: Needs assistance with toileting (09/1950) Elimination Interventions: Call light in reach (09/1950) Prior Fall History: Before admission in past 12 months _home or previous inpatient care) (09/1950) History of Falls Interventions: Door open when patient unattended (09/1950) Total Score: 4 (09/1950) High Fall Risk: Yes (09/1950) Labs/Studies: 
Recent Results (from the past 72 hour(s)) GLUCOSE, POC Collection Time: 09/09/18 11:02 AM  
Result Value Ref Range Glucose (POC) 244 (H) 65 - 100 mg/dL GLUCOSE, POC Collection Time: 09/09/18  4:06 PM  
Result Value Ref Range Glucose (POC) 97 65 - 100 mg/dL GLUCOSE, POC Collection Time: 09/09/18  8:36 PM  
Result Value Ref Range Glucose (POC) 190 (H) 65 - 100 mg/dL PROTHROMBIN TIME + INR Collection Time: 09/10/18  4:47 AM  
Result Value Ref Range Prothrombin time 21.5 (H) 11.5 - 14.5 sec INR 2.0    
CBC WITH AUTOMATED DIFF Collection Time: 09/10/18  4:47 AM  
Result Value Ref Range WBC 8.2 4.3 - 11.1 K/uL  
 RBC 3.26 (L) 4.05 - 5.2 M/uL HGB 9.6 (L) 11.7 - 15.4 g/dL HCT 30.7 (L) 35.8 - 46.3 % MCV 94.2 79.6 - 97.8 FL  
 MCH 29.4 26.1 - 32.9 PG  
 MCHC 31.3 (L) 31.4 - 35.0 g/dL  
 RDW 15.4 % PLATELET 394 977 - 366 K/uL MPV 10.3 9.4 - 12.3 FL ABSOLUTE NRBC 0.00 0.0 - 0.2 K/uL  
 DF AUTOMATED NEUTROPHILS 68 43 - 78 % LYMPHOCYTES 20 13 - 44 % MONOCYTES 9 4.0 - 12.0 % EOSINOPHILS 2 0.5 - 7.8 % BASOPHILS 0 0.0 - 2.0 % IMMATURE GRANULOCYTES 0 0.0 - 5.0 %  
 ABS. NEUTROPHILS 5.5 1.7 - 8.2 K/UL  
 ABS. LYMPHOCYTES 1.7 0.5 - 4.6 K/UL  
 ABS. MONOCYTES 0.8 0.1 - 1.3 K/UL  
 ABS. EOSINOPHILS 0.2 0.0 - 0.8 K/UL  
 ABS. BASOPHILS 0.0 0.0 - 0.2 K/UL  
 ABS. IMM. GRANS. 0.0 0.0 - 0.5 K/UL METABOLIC PANEL, BASIC Collection Time: 09/10/18  4:47 AM  
Result Value Ref Range Sodium 140 136 - 145 mmol/L Potassium 4.6 3.5 - 5.1 mmol/L Chloride 91 (L) 98 - 107 mmol/L  
 CO2 42 (HH) 21 - 32 mmol/L Anion gap 7 7 - 16 mmol/L Glucose 100 65 - 100 mg/dL BUN 55 (H) 8 - 23 MG/DL Creatinine 1.55 (H) 0.6 - 1.0 MG/DL  
 GFR est AA 43 (L) >60 ml/min/1.73m2 GFR est non-AA 35 (L) >60 ml/min/1.73m2 Calcium 9.8 8.3 - 10.4 MG/DL  
GLUCOSE, POC Collection Time: 09/10/18  7:29 AM  
Result Value Ref Range Glucose (POC) 108 (H) 65 - 100 mg/dL PLEASE READ & DOCUMENT PPD TEST IN 24 HRS Collection Time: 09/10/18  8:16 AM  
Result Value Ref Range PPD Negative Negative  
 mm Induration 0 mm GLUCOSE, POC Collection Time: 09/10/18 11:13 AM  
Result Value Ref Range Glucose (POC) 153 (H) 65 - 100 mg/dL GLUCOSE, POC Collection Time: 09/10/18  4:26 PM  
Result Value Ref Range Glucose (POC) 157 (H) 65 - 100 mg/dL GLUCOSE, POC Collection Time: 09/10/18  9:00 PM  
Result Value Ref Range Glucose (POC) 115 (H) 65 - 100 mg/dL CBC WITH AUTOMATED DIFF Collection Time: 09/11/18  3:59 AM  
Result Value Ref Range WBC 8.4 4.3 - 11.1 K/uL  
 RBC 3.28 (L) 4.05 - 5.2 M/uL HGB 9.5 (L) 11.7 - 15.4 g/dL HCT 30.4 (L) 35.8 - 46.3 % MCV 92.7 79.6 - 97.8 FL  
 MCH 29.0 26.1 - 32.9 PG  
 MCHC 31.3 (L) 31.4 - 35.0 g/dL  
 RDW 15.4 % PLATELET 913 771 - 650 K/uL MPV 10.1 9.4 - 12.3 FL ABSOLUTE NRBC 0.00 0.0 - 0.2 K/uL  
 DF AUTOMATED NEUTROPHILS 60 43 - 78 % LYMPHOCYTES 28 13 - 44 % MONOCYTES 10 4.0 - 12.0 % EOSINOPHILS 2 0.5 - 7.8 % BASOPHILS 0 0.0 - 2.0 % IMMATURE GRANULOCYTES 0 0.0 - 5.0 %  
 ABS. NEUTROPHILS 5.0 1.7 - 8.2 K/UL  
 ABS. LYMPHOCYTES 2.3 0.5 - 4.6 K/UL  
 ABS. MONOCYTES 0.8 0.1 - 1.3 K/UL  
 ABS. EOSINOPHILS 0.2 0.0 - 0.8 K/UL  
 ABS. BASOPHILS 0.0 0.0 - 0.2 K/UL  
 ABS. IMM. GRANS. 0.0 0.0 - 0.5 K/UL METABOLIC PANEL, BASIC Collection Time: 09/11/18  3:59 AM  
Result Value Ref Range  Sodium 142 136 - 145 mmol/L  
 Potassium 4.4 3.5 - 5.1 mmol/L Chloride 92 (L) 98 - 107 mmol/L  
 CO2 42 (HH) 21 - 32 mmol/L Anion gap 8 7 - 16 mmol/L Glucose 120 (H) 65 - 100 mg/dL BUN 62 (H) 8 - 23 MG/DL Creatinine 2.05 (H) 0.6 - 1.0 MG/DL  
 GFR est AA 31 (L) >60 ml/min/1.73m2 GFR est non-AA 25 (L) >60 ml/min/1.73m2 Calcium 9.7 8.3 - 10.4 MG/DL PROTHROMBIN TIME + INR Collection Time: 09/11/18  3:59 AM  
Result Value Ref Range Prothrombin time 17.1 (H) 11.5 - 14.5 sec INR 1.5 GLUCOSE, POC Collection Time: 09/11/18  7:42 AM  
Result Value Ref Range Glucose (POC) 117 (H) 65 - 100 mg/dL PLEASE READ & DOCUMENT PPD TEST IN 48 HRS Collection Time: 09/11/18  9:27 AM  
Result Value Ref Range PPD  neggative Negative  
 mm Induration  0 mm GLUCOSE, POC Collection Time: 09/11/18 11:02 AM  
Result Value Ref Range Glucose (POC) 155 (H) 65 - 100 mg/dL GLUCOSE, POC Collection Time: 09/11/18  4:27 PM  
Result Value Ref Range Glucose (POC) 106 (H) 65 - 100 mg/dL GLUCOSE, POC Collection Time: 09/11/18  8:35 PM  
Result Value Ref Range Glucose (POC) 186 (H) 65 - 100 mg/dL CBC WITH AUTOMATED DIFF Collection Time: 09/12/18  6:11 AM  
Result Value Ref Range WBC 7.8 4.3 - 11.1 K/uL  
 RBC 3.58 (L) 4.05 - 5.2 M/uL  
 HGB 10.5 (L) 11.7 - 15.4 g/dL HCT 33.8 (L) 35.8 - 46.3 % MCV 94.4 79.6 - 97.8 FL  
 MCH 29.3 26.1 - 32.9 PG  
 MCHC 31.1 (L) 31.4 - 35.0 g/dL  
 RDW 15.9 % PLATELET 893 245 - 432 K/uL MPV 10.3 9.4 - 12.3 FL ABSOLUTE NRBC 0.00 0.0 - 0.2 K/uL  
 DF AUTOMATED NEUTROPHILS 64 43 - 78 % LYMPHOCYTES 24 13 - 44 % MONOCYTES 9 4.0 - 12.0 % EOSINOPHILS 2 0.5 - 7.8 % BASOPHILS 1 0.0 - 2.0 % IMMATURE GRANULOCYTES 0 0.0 - 5.0 %  
 ABS. NEUTROPHILS 5.0 1.7 - 8.2 K/UL  
 ABS. LYMPHOCYTES 1.9 0.5 - 4.6 K/UL  
 ABS. MONOCYTES 0.7 0.1 - 1.3 K/UL  
 ABS. EOSINOPHILS 0.2 0.0 - 0.8 K/UL  
 ABS. BASOPHILS 0.0 0.0 - 0.2 K/UL ABS. IMM. GRANS. 0.0 0.0 - 0.5 K/UL METABOLIC PANEL, BASIC Collection Time: 09/12/18  6:11 AM  
Result Value Ref Range Sodium 141 136 - 145 mmol/L Potassium 4.2 3.5 - 5.1 mmol/L Chloride 92 (L) 98 - 107 mmol/L  
 CO2 39 (H) 21 - 32 mmol/L Anion gap 10 7 - 16 mmol/L Glucose 97 65 - 100 mg/dL BUN 66 (H) 8 - 23 MG/DL Creatinine 2.35 (H) 0.6 - 1.0 MG/DL  
 GFR est AA 26 (L) >60 ml/min/1.73m2 GFR est non-AA 22 (L) >60 ml/min/1.73m2 Calcium 10.4 8.3 - 10.4 MG/DL MAGNESIUM Collection Time: 09/12/18  6:11 AM  
Result Value Ref Range Magnesium 2.2 1.8 - 2.4 mg/dL PROTHROMBIN TIME + INR Collection Time: 09/12/18  6:11 AM  
Result Value Ref Range Prothrombin time 15.8 (H) 11.5 - 14.5 sec INR 1.3 GLUCOSE, POC Collection Time: 09/12/18  7:36 AM  
Result Value Ref Range Glucose (POC) 119 (H) 65 - 100 mg/dL Assessment:  
 
Problem List as of 9/12/2018  Date Reviewed: 8/31/2018 Codes Class Noted - Resolved Coagulopathy ([facility] INR >4 on admission 9/7/18 ICD-10-CM: D68.9 ICD-9-CM: 286.9  9/8/2018 - Present Traumatic hematoma of left knee ICD-10-CM: R92.03CR ICD-9-CM: 924.11  9/8/2018 - Present Debility ICD-10-CM: R53.81 ICD-9-CM: 799.3  9/8/2018 - Present Anemia ICD-10-CM: D64.9 ICD-9-CM: 285.9  9/7/2018 - Present Chronic respiratory failure with hypoxia (HCC) (Chronic) ICD-10-CM: J96.11 
ICD-9-CM: 518.83, 799.02  9/7/2018 - Present Acute kidney injury superimposed on chronic kidney disease ([facility] ICD-10-CM: N17.9, N18.9 ICD-9-CM: 866.00, 585.9  9/7/2018 - Present Encounter for immunization ICD-10-CM: U34 ICD-9-CM: V03.89  8/21/2018 - Present Obesity, morbid ([facility] ICD-10-CM: E66.01 
ICD-9-CM: 278.01  6/8/2018 - Present Physical debility ICD-10-CM: R53.81 ICD-9-CM: 799.3  5/2/2018 - Present Closed nondisplaced fracture of shaft of fifth metacarpal bone of left hand ICD-10-CM: H29.279K ICD-9-CM: 815.03  4/28/2018 - Present Subdural hematoma (HCC) ICD-10-CM: I62.00 ICD-9-CM: 432.1  4/27/2018 - Present Long term (current) use of anticoagulants (Chronic) ICD-10-CM: Z79.01 
ICD-9-CM: V58.61  4/19/2018 - Present Dysthymia ICD-10-CM: F34.1 ICD-9-CM: 300.4  4/5/2018 - Present Type 2 diabetes mellitus with nephropathy (HCC) (Chronic) ICD-10-CM: E11.21 
ICD-9-CM: 250.40, 583.81  12/18/2017 - Present Pulmonary hypertension (HCC) (Chronic) ICD-10-CM: I27.20 ICD-9-CM: 416.8  6/15/2016 - Present S/P AVR (aortic valve replacement) (Chronic) ICD-10-CM: Z95.2 ICD-9-CM: V43.3  Unknown - Present Overview Signed 2/23/2016 11:04 AM by Leonora Mckeon Mechanical/Artific Cardiomyopathy ([facility] (Chronic) ICD-10-CM: I42.9 ICD-9-CM: 425.4  Unknown - Present Osteopenia ICD-10-CM: M85.80 ICD-9-CM: 733.90  Unknown - Present HLD (hyperlipidemia) (Chronic) ICD-10-CM: N79.6 ICD-9-CM: 272.4  Unknown - Present Osteoarthritis ICD-10-CM: M19.90 ICD-9-CM: 715.90  Unknown - Present  
   
 ICD (implantable cardioverter-defibrillator) in place ICD-10-CM: Z95.810 ICD-9-CM: V45.02  10/2/2014 - Present Overview Signed 10/2/2014  4:58 PM by Pedrito Haskins III [employer] single-chamber ICD implantation 10/20/14 Diabetes mellitus type 2, diet-controlled (HCC) (Chronic) ICD-10-CM: E11.9 ICD-9-CM: 250.00  8/28/2014 - Present COPD (chronic obstructive pulmonary disease) (HCC) (Chronic) ICD-10-CM: J44.9 ICD-9-CM: 551  4/2/2014 - Present REJI (obstructive sleep apnea)-cpap (Chronic) ICD-10-CM: G47.33 
ICD-9-CM: 327.23  4/2/2014 - Present CKD (chronic kidney disease) stage 3, GFR 30-59 ml/min (Chronic) ICD-10-CM: N18.3 ICD-9-CM: 585.3  7/10/2013 - Present Anticoagulated on Coumadin (Chronic) ICD-10-CM: Z51.81, Z79.01 
ICD-9-CM: V58.83, V58.61  7/9/2013 - Present Overview Signed 7/9/2013  4:16 PM by John Acevedo NP  
  S/P AVR 
  
  
   
 CAD (coronary artery disease) (Chronic) ICD-10-CM: I25.10 ICD-9-CM: 414.00  1/20/2013 - Present Overview Signed 5/20/2014 10:05 AM by Valarie Khan NP  
  5/8/14 PCI LAD with stent placed Chronic combined systolic and diastolic heart failure (HCC) (Chronic) ICD-10-CM: I50.42 
ICD-9-CM: 428.42  1/20/2013 - Present Overview Addendum 4/28/2018  4:53 AM by Daniel Winters MD  
  5/8/14 ECHO:  EF 10-15% 12/2017:  EF 25-30% Essential hypertension, benign (Chronic) ICD-10-CM: I10 
ICD-9-CM: 401.1  1/20/2013 - Present MDS (myelodysplastic syndrome) (HCC) (Chronic) ICD-10-CM: D46.9 ICD-9-CM: 238.75  12/17/2011 - Present Overview Addendum 8/29/2013 11:06 AM by Stephie Jones Procrit started in August, 2011 12/18/11 Procrit weekly and Iron stores. 5-12 12-13-12 good response to 3 weekly procrit did not need it last time 3-7-13 Pt doing well. Just wanted a \"check-up. \" Responding to Procrit every three weeks. 8-29-13 patient has missed some injections on recently restarted hemoglobin only issue , takes oral iron iron stores each time Iron deficiency anemia due to chronic blood loss (Chronic) ICD-10-CM: D50.0 ICD-9-CM: 280.0  7/29/2009 - Present RESOLVED: Routine general medical examination at a health care facility ICD-10-CM: Z00.00 ICD-9-CM: V70.0  12/16/2016 - 4/27/2018  
   
  
 
  
Assessment: Profound Physical Debility s/p admitted with moderately severe anemia 
  
Continue daily physician medical management: 
Pneumonia prophylaxis- Incentive spirometer every hour while awake 
  
Chronic hypoxic respir failure; cont RT txs. On Trilogy at [facility]. Monitor O2 sats and cont O2 supplementation.  Currently on 3L per NC 
  
DVT risk / DVT Prophylaxis- Will require daily physician exam to assess for signs and symptoms as patient is at increased risk for of thromboembolism. Mobilization as tolerated. Intermittent pneumatic compression devices when in bed Thigh-high or knee-high thromboembolic deterrent hose when out of bed. No further anticoag is needed; on therapeutic Lovenox while bridging to coumadin 
-9/12 inr down 1.3 ;pharmacy dosing 
  
S/p mech AVR; chronic coumadin therapy but such hi risk of bleeding due to multiple falls.   
  
CHF; cont aldactone, coreg, imdur and lasix 
  
Pain Control: ongoing significant pain in knees which is stable and controlled by PRN meds. Will require regular pain assessment and comprenhensive pain management,  
  
Wound Care: Monitor wound status daily per staff and physician. At risk for failure. Will require 24/7 rehab nursing. Keep wound clean and dry, Reinforce dressing PRN and Ice to area for comfort 
  
Hypertension - BP uncontrolled, fluctuating, managed medically.  
  
Acute on chronic anemia; possible MDS followed by Dr Heidi Rivera. Monitor closely, sp transfusion. Cont epocrit. Cont ferrous sulfate and vit C 
  
Depression; cont Lexapro. Depression regarding medical cond and fear of losing independence 
  
Diabetes mellitus - Uncontrolled. poor glycemic control. Will require daily, close FSG monitoring and medication adjustment to optimize glycemic control in setting of acute illness and hospitalization.  
  
Urinary retention/ neurogenic bladder - schedule voids q6-8 hrs. Check post-void residual as needed; In and Out catheterize if post-void residual is more than 400 cc. 
  
CKD stg 3; creat 2.05 on admission to rehab. Was 1.55 yesterday. Recheck in a.m and review meds contributing. 
-9/12 creat is increasing; dec lasix 
  
bowel program - add bowel program prn 
  
GERD - add PPI. At times may need additional antacids, Maalox prn. Previous hx of GIB 
  
  
 
Time spent was 25 minutes with over 1/2 in direct patient care/examination, consultation and coordination of care.  
  
Signed By: Sarah Mendez MD   
 September 12, 2018

## 2018-09-12 NOTE — PROGRESS NOTES
Pt resting comfortably in bed. Pt is A/O x 4. Dressing to left knee intact and dry. Pt on 3 L O2 N/C  at 93 %. Pt , no insulin needed. VVS, pt encourage to call for any needs.  Call light with in reach,

## 2018-09-12 NOTE — PROGRESS NOTES
End Of Shift Functional Summary, Nursing TOILETING:   
Does patient need assist with adjusting pants up or down and/or pericare? yes If yes, please indicate what the patient needs help with:  Pants up and down  (i.e. pants up, pants down, pericare) Pt uses walker/BSC. Does the patient require extra time? yes Does the patient require standby assistance? yes Does the patient require contact guard assistance? yes Does the patient require more than one staff member for assistance? no 
 
TOILET TRANSFER:   
Pt requires moderate assistance. Pt uses walker. Does the patient require extra time? yes Does the patient require contact guard assistance? yes Does the patient require more than one staff member for assistance? no 
 
BLADDER:   
Pt does not have a medina catheter that staff manages. Pt does not take medication. Pt is continent./incont of bladder and voids in bedside commode/brief. Pt requires staff to empty device and change brief. Pt has had 1 bladder accidents during this shift requiring moderate assistance to clean up. (An accident is when the episode is not contained in a brief AND/OR the clothing/linen requires changing/cleaning up.) BOWEL:  Pt does take medication. Pt is continent of bowel and uses bedside commode. Pt requires staff to position device. Pt has had 0 bowel accidents during this shift. (An accident is when the episode is not contained in a brief AND/OR the clothing/linen requires changing/cleaning up.) BED/CHAIR TRANSFER Pt requires moderate assistance. Pt uses walker Does the patient require extra time? yes Does the patient require contact guard assistance? yes Does the patient require more than one staff member for assistance? no 
 
Documentation reviewed and plan of care discussed/reviewed with  
patient, oncoming nurse and patient assistant during the shift.

## 2018-09-12 NOTE — PROGRESS NOTES
Pt c/o pain 8/10 to left leg. Pt given Oxycodone 10 mg po for pain. Pt in therapy, but only has another 30 min. Working with Jane Gill.

## 2018-09-12 NOTE — PROGRESS NOTES
[facility] OCCUPATIONAL THERAPY INITIAL EVALUATION Time In: 0187 Time Out: 1003 Precautions: Falls and Oxygen 3L continuous Vitals: Patient Vitals for the past 8 hrs: 
 Temp Pulse Resp BP SpO2  
09/12/18 0806 98.2 °F (36.8 °C) 74 18 117/53 95 % 09/12/18 0756 - - - - 94 % Pain: Not indicated History of Presenting Illness (per previous reports): Socorro Dangelo is a 77 y.o. female patient at [facility] who was admitted on 9/11/2018  by Florian Aden MD with below mentioned medical history ,is being seen for Physical Medicine and Rehabilitation.   
  
HPI; Ms. Diaz is a delightful, functionally mod I 76 yo female well known to me from her [facility] stay in June 2018 s/p fall with bilateral wrist fxs. She has an extensive PMH including CAD /PCI, CKD, Cardiomyopathy, CHF, COPD with REJI and chronic hypoxic respiratory failure on Trilogy at [facility] , on chronic anticoagulation with Coumadin for hx of mechanical AVR , and ?MDS (Had Bone marrow biopsy that was nondiagnostic however unable to rule out lower grade MDS0 with chronic anemia. She is followed by Dr Diamond Stanley of Heme/onc and receives intermittent blood transfusions and Procrit monthly. She presented to the ED on 9/7  after falling in her BR on 8/28. Fortunately, her aid was there at the house assisting her. She had sustained a left knee injury. She presented due to weakness, dizziness and overall fatigue and family noted worsening confusion. She had received one unit of blood on 8/31 for a Hgb of 6.7. In the ED, her hgb was 6.5. She received a blood transfusion and her hgb improved to 9.3 and is currently 9.6. An Xray of the left knee showed pretibial soft tissue edema and swelling, but no evidence of fracture or other acute bony abnormality. She was seen in consultation by orthopedist, Dr Leisa Wheatley. Her INR was 4.3 on admission, with goal of 2.5-3.0. Coumadin has been on hold.  He performed epidermolysis of the blisters earlier today. She is subtherapeutic on coumadin and is being restarted on it today. She is also on renal dosed therapeutic Lovenox. Her renal fxn has bumped back up to a creatinine of 2.05 from 1.55, her baseline is approx 1.8-2 Past Medical History/ Co-morbidities:  
Past Medical History:  
Diagnosis Date  Anticoagulated on Coumadin 7/9/2013 S/P AVR  CAD (coronary artery disease) 1/20/2013 5/8/14 PCI LAD with stent placed  Cardiomy[facility] [address]  CHF (congestive heart failure) ([facility] [address] 2/17/2017  CKD (chronic kidney disease) stage 3, GFR 30-59 ml/min 7/10/2013  COPD (chronic obstructive pulmonary disease) ([facility] [address] 4/2/2014  Essential hypertension, benign 1/20/2013  HLD (hyperlipidemia)  ICD (implantable cardioverter-defibrillator) in place 10/2/2014 Biotronik single-chamber ICD implantation 10/20/14  Iron deficiency anemia due to chronic blood loss 7/29/2009  MDS (myelodysplastic syndrome) ([facility] [address] 12/17/2011 Procrit started in August, 2011 12/18/11 Procrit weekly and Iron stores. 5-12 12-13-12 good response to 3 weekly procrit did not need it last time  3-7-13 Pt doing well. Just wanted a \"check-up. \" Responding to Procrit every three weeks. 8-29-13 patient has missed some injections on recently restarted hemoglobin only issue , takes oral iron iron stores each time  REJI (obstructive sleep apnea)-cpap 4/2/2014  Osteoarthritis  Osteopenia  Quadrantanopia  Visual complaint 12/14/2015 Patient's Goal: \"Get back to where I'm supposed to be. \" Previous Level of Function: Patient was receiving assist 4 days/week from aide for ADLs (completing shower in bathtub), however reports she was independent ambulating household distances without AD and able to manage microwave meals Home Situation Environment Comments House Situation Apartment Lives Alone Yes Support Systems Family member(s) Shower Situation Tub/Shower combination Current DME Oxygen, portable, Shower chair, Raised toilet seat, Wheelchair Lift Chair Stairs to Enter 3 Rails W/C Ramp Interior Steps Upper Extremity Function HELADIO REAVES [address]  Impaired  Impaired G[facility]  Intact  Impaired Light Touch No apparent deficit No apparent deficit Sharp/Dull Discrimination Hot/Cold No apparent deficit No apparent deficit Proprioception Stereognosis 9 Hole Peg Test Impaired (Unable) Impaired (Unable) General Comments Reports numbness in distal phalanx of thumb and index finger, ulnar drift affecting digits 3-5 with patient unable to oppose pinky. History of hand/wrist fractures prior to last admission to [facility] requiring cast/splinting Limited by pain in \"armpit\" and medial aspect of RUE with AROM, reports history of shoulder injury and decreased adherence to therapy protocols. History of hand/wrist fractures prior to last admission to [facility] requiring cast/splinting HELADIO REAVES General Evalutaion AROM Shoulder Flexion 4 2- Shoulder Extension Shoulder Abduction 4- 2- Shoulder Adduction Elbow Flexion 4+ 3-  
Elbow Extension  4- 3 Wrist Flexion/Extension 4+  3  3+ 4-  
General Comments 0/5 No palpable muscle contraction 1/5 Palpable muscle contraction, no joint movement 2-/5 Less than full range of motion in gravity eliminated position 2/5 Able to complete full range of motion in gravity eliminated position 2+/5 Able to initiate movement against gravity 3-/5 More than half but not full range of motion against gravity 3/5 Able to complete full range of motion against gravity 3+/5 Completes full range of motion against gravity with minimal resistance 4-/5 Completes full range of motion against gravity with minimal-moderate resistance 4/5 Completes full range of motion against gravity with moderate resistance 4+/5 Completes full range of motion against gravity with moderate-maximum resistance 5/5 Completes full range of motion against gravity with maximum resistance Cognition Performance Comments Orientation Level Oriented X4 Comprehension Level 7 Mode: Auditory Hearing Aid:None Glasses:Glasses Expression (Native Language) 7 Mode: Verbal 
WNL Social Interaction/Pragmatics 7 Pleasant and agreeable Problem Solving 6 Increased time Memory 6 Increased time Comments Activities of Daily Living Score Comments Self-Feeding 5 Setup d/t decreased  strength Grooming 5 Tasks completed by patient: Washed hands Setup d/t inability to reach soap Bathing 3 Body Parts Bathe: Abdomen, Arm, left, Arm, right, Chest, Aura area, Thigh, left, Thigh, right Assist to bathe B feet and bottom d/t inability to reach, CGA-S for sit to stand and standing balance Tub/Shower Transfer [place] 0 Patient declined Upper Body Dressing/Undressing 2 Items Applied:Pullover (4 steps) Assist to start B arms through sleeves and help overhead Lower Body Dressing/Undressing 1 Items Applied:Sock, left (1 step), Sock, right (1 step), Elastic waist pants (3 steps) Patient able to participate in managing pants over hips only Toileting 2 Assist for pants on and off hips Toilet Transfer 4 CGA SPT wheelchair <> toilet using grab bar Vision/Perception: Patient with low vision, unable to read small print however accurate for reading clock on wall. Patient reports improved vision in peripherals versus central vision. Wears glasses. Instrumental Activities of Daily Living Performance Comments Meal Preperation Total assistance Homemaking Total assistance Medication Management Setup Financial Management Setup Session: Patient seen for ADL evaluation this AM, see above for details.   Patient with BUE ROM issues and decreased  strength and [facility] bilaterally which she reports was limiting independence with ADLs PTA (note history of UE fractures/injuries). Patient on 3L continuous 02 via nasal cannula (same as PTA). Patient is pleasant and motivated to return to PLOF (requiring assist of aide 4 days/week for ADLs). Interdisciplinary Communication: Collaborated with PT and nursing for current level of function, plan of care, and measures to assure safety during their stay. Updated board with current level of function to increase carryover between disciplines. Patient/Family Education: Patient was/were educated On the role of OT, On POC and On IRC expectations. Problem List: [address], Activity Tolerance, Decreased ROM BUE, Visual Perceptual , Safety Awareness, Strength, Pain and Standing Balance Functional Limitations: ADL, IADL, Functional Transfers and Functional Mobility Goals: Please see Care Plan OT order received and chart reviewed. OT orders have been acknowledged. Patient will benefit from skilled OT services to address ADL, functional transfers, UE strength, UE coordination, balance and activity tolerance to maximize functional performance with daily self-care tasks and functional mobility. Treatment is likely to include ADL, Balance, Strength, Activity tolerance, Visual perceptual, Cognitive, DME, AE, Family  and Safety awareness training to increase independence with self-care. Patient will be seen for 1.5-2 hours of skilled OT services 5-6 days a week. Shagufta Lambert OT 
9/12/2018

## 2018-09-12 NOTE — PROGRESS NOTES
End Of Shift Functional Summary, Nursing TOILETING:   
Does patient need assist with adjusting pants up or down and/or pericare? yes If yes, please indicate what the patient needs help with:  Pulling pants back up  (i.e. pants up, pants down, pericare) Pt uses walker. Does the patient require extra time? yes Does the patient require standby assistance? yes Does the patient require contact guard assistance? no 
Does the patient require more than one staff member for assistance? no 
 
TOILET TRANSFER:   
Pt requires minimal assistance. Pt uses walker. Does the patient require extra time? yes Does the patient require contact guard assistance? no 
Does the patient require more than one staff member for assistance? no 
 
BLADDER:   
Pt does not have a medina catheter that staff manages. Pt does not take medication. If so, please indicate which medication: none Pt is continent. of bladder and voids in bedside commode  Pt requires staff to empty device Pt has had 0 bladder accidents during this shift  (An accident is when the episode is not contained in a brief AND/OR the clothing/linen requires changing/cleaning up.) BOWEL:  Pt does take medication. Pt is continent of bowel and uses bedside commode. Pt requires staff to empty device    Pt has had 0 bowel accidents during this shift . (An accident is when the episode is not contained in a brief AND/OR the clothing/linen requires changing/cleaning up.) BED/CHAIR TRANSFER Pt requires standby assistance/setup. Pt uses walker Does the patient require extra time? yes Does the patient require contact guard assistance? no 
Does the patient require more than one staff member for assistance? no 
 
 
 
EATING Pt requires no assistance. Pt wears dentures, but they are at home. TUBE FEEDINGS:  Pt does not  receive nutrition through tube feedings. Patient requires no assistance with feedings. Documentation reviewed and plan of care discussed/reviewed with  
patient during the shift.

## 2018-09-12 NOTE — PROGRESS NOTES
PHYSICAL THERAPY EXAMINATION 
TIME IN 1004 TIME OUT 1121 Patient Name: Robel Madera Patient Age: 77 y.o. Past Medical History:  
Past Medical History:  
Diagnosis Date  Anticoagulated on Coumadin 7/9/2013 S/P AVR  CAD (coronary artery disease) 1/20/2013 5/8/14 PCI LAD with stent placed  Cardiomy[facility] [address]  [facility] 2/17/2017  CKD (chronic kidney disease) stage 3, GFR 30-59 ml/min 7/10/2013  COPD (chronic obstructive pulmonary disease) [facility] 4/2/2014  Essential hypertension, benign 1/20/2013  HLD (hyperlipidemia)  ICD (implantable cardioverter-defibrillator) in place 10/2/2014 Biotronik single-chamber ICD implantation 10/20/14  Iron deficiency anemia due to chronic blood loss 7/29/2009  MDS (myelodysplastic syndrome) ([facility] 12/17/2011 Procrit started in August, 2011 12/18/11 Procrit weekly and Iron stores. 5-12 12-13-12 good response to 3 weekly procrit did not need it last time  3-7-13 Pt doing well. Just wanted a \"check-up. \" Responding to Procrit every three weeks. 8-29-13 patient has missed some injections on recently restarted hemoglobin only issue , takes oral iron iron stores each time  REJI (obstructive sleep apnea)-cpap 4/2/2014  Osteoarthritis  Osteopenia  Quadrantanopia  Visual complaint 12/14/2015 Medical Diagnosis:  debility Physical debility Physical debility Precautions at Admission: Other (comment) (Low Vision, Fall Risk) Therapy Diagnosis:  
Difficulty with bed mobility  [x] Difficulty with functional transfers  [x] Difficulty with ambulation  [x] Difficulty with stair negotiations  [x] Problem List:   
Decreased strength B LE  [x] Decreased strength trunk/core  [x] Decreased AROM   [x] Decreased PROM  [x] Decreased endurance  [x] Decreased balance sitting  [] Decreased balance standing  [x] Pain   [] Slow ambulation velocity  [x] Decreased coordination  [x] Decreased safety awareness  [] Functional Limitations:  
Decreased independence with bed mobility  [x] Decreased independence with functional transfers  [x] Decreased independence with ambulation  [x] Decreased independence with stair negotiation  [x] Previous Functional Level: Patient was receiving assist 4 days/week from aide for ADLs (completing shower in bathtub), however reports she was independent ambulating household distances without AD and able to manage microwave meals Home Environment: Home Environment: Apartment # Steps to Enter: 3 One/Two Story Residence: One story Living Alone: Yes Support Systems: Family member(s) Patient Expects to be Discharged to[de-identified] [employer] Current DME Used/Available at Home: Oxygen, portable, Raised toilet seat, Shower chair Tub or Shower Type: Tub/Shower combination Outcome Measures: Vital Signs: 
Patient Vitals for the past 12 hrs: 
 Temp Pulse Resp BP SpO2  
09/12/18 0806 98.2 °F (36.8 °C) 74 18 117/53 95 % 09/12/18 0756 - - - - 94 % 09/12/18 0507 - - - - 95 % Pain level: 4 to 5 out of 10 left hand and fingers with attempt to straighten fingers Pain location:left hand and fingers Pain interventions:Pain medication per RN, rest, positioning,patient's family to bring in splint for left hand Patient education: PT POC and goals, Bed mobility training,transfer training, gait training, fall precautions, activity pacing, energy conservation, body mechanics, Patient verbalizing understanding and demonstrating partial understanding of patient education. Recommend follow up education. Interdisciplinary Communication:spoke with OT regarding LOS, goals and functional status MMT Initial Asssessment Right Lower Extremity Left Lower Extremity Hip Flexion 3+ 3-  
Knee Extension 5 4+ Knee Flexion 4+ 4 Ankle Dorsiflexion   4  
0/5 No palpable muscle contraction 1/5 Palpable muscle contraction, no joint movement 2-/5 Less than full range of motion in gravity eliminated position 2/5 Able to complete full range of motion in gravity eliminated position 2+/5 Able to initiate movement against gravity 3-/5 More than half but not full range of motion against gravity 3/5 Able to complete full range of motion against gravity 3+/5 Completes full range of motion against gravity with minimal resistance 4-/5 Completes full range of motion against gravity with minimal-moderate resistance 4/5 Completes full range of motion against gravity with moderate resistance 4+/5 Completes full range of motion against gravity with moderate-maximum resistance 5/5 Completes full range of motion against gravity with maximum resistance AROM:bilateral LE generally decreased, functional 
 
FIM SCORES Initial Assessment Bed/Chair/Wheelchair Transfers 4 Wheelchair Mobility 0 Walking [place] 1 Steps/Stairs 2 PRIMARY MODE OF LOCOMOTION: ambulation Please see C Interdisciplinary Eval: Coordination/Balance Section for details regarding FIM score description. BED/CHAIR/WHEELCHAIR TRANSFERS Initial Assessment Rolling Right 6 (Modified independent) Rolling Left 6 (Modified independent) Supine to Sit 5 (Supervision) Sit to Stand Contact guard assistance Sit to Supine 4 (Minimal assistance) Transfer Assist Score 4 Transfer Type SPT without device Comments Increased time and effort to complete bed mobility and transfers. Cues for breathing techniques Car Transfer Not tested Car Type WHEELCHAIR MOBILITY/MANAGEMENT Initial Assessment Able to Propel 0 feet Functional Level 0 Curbs/ramps assistance required 0 (Not tested) Wheelchair set up assistance required 0 (Not tested) Wheelchair management WALKING INDEPENDENCE Initial Assessment Assistive device Gait belt Ambulation assistance - level surface 4 (Contact guard assistance) Distance 40 Feet (ft) Functional Level 1 Comments slow cont partial step through gait pattern with trunk flexion, increased base of support with increased hip ext rot, decreased ankle PF and knee flexion at terminal stance and decreased knee ext and ankle DF at initial contact Ambulation assistance - unlevel surface 0 (Not tested) STEPS/STAIRS Initial Assessment Steps/Stairs ambulated 4 Rail Use Right Functional Level 2 Comments slow reciprocating pattern going up steps and single step at a time going down steps leading down with LLE. Increased time and effort to complete with cues for actiity pacing and safe foot placement Curbs/Ramps 0 (Not tested) QUALITY INDICATOR ASSIST COMMENTS Walk 10 feet With gait belt and CGA Walk 50 feet with 2 turns Unable due to decreased endurance and respiratory status Walk 150 feet Unable due to decreased endurance and respiratory status Walk 10 feet on uneven  Unable due to balance and visual deficits 1 step/curb CGA with hand rails 4 steps CGA with hand rails 12 steps Unable due to decreased endurance and respiratory status  object CGA Wheel 50' w/2 turns Unable due to RUE weakness and limited ROM Wheel 150' NA   
 
 
SUPINE EXERCISES Sets Reps Comments, min assist to complete LE exercises, Increased time and effort to complete with multiple and frequent rest breaks. Cues for breathing pattern and correct form Ankle Pumps 1 10 Heel Slides 1 10 Hip Abduction 1 10 Short Arc Quad 1 10 Straight Leg Raise 1 10 LE motomed x 10 mins at level 2 PHYSICAL THERAPY PLAN OF CARE Therapy Diagnosis:   Please see table above Order received from MD for physical therapy services and chart reviewed. Pt to be seen at least 5 times per week for at least 1.5 hours of physical therapy per day for 10 days.   Thank you for the referral. 
 
LTGs: 
 LTG 1. Patient transfer supine<>sit with modified independence in 10 days LTG 2. Patient transfer sit<>stand and perform stand pivot transfer with or without a device and modified independence in 10 days LTG 3. Patient ambulate 100 ft with LRAD and modified independence in 10 days LTG 4. Patient ambulate up/down 4 steps with right hand rail and supervision in 10 days Pt would benefit from skilled physical therapy in order to improve independent functional mobility within the home. Interventions may include range of motion (AROM, PROM B LE/trunk), motor function (B LE/trunk strengthening/coordination), activity tolerance (vitals, oxygen saturation levels), bed mobility training, balance activities, gait training (progressive ambulation program), and functional transfer training. Please see IRC; Interdisciplinary Eval, Care Plan, and Patient Education for further information regarding physical therapy examination and plan of care. Patient returned to room at end of treatment and remained up in recliner with LEs elevated and with needs in reach. 02 at 3 lpm 
  
Abilio Economy, PT 
9/12/2018

## 2018-09-12 NOTE — PROGRESS NOTES
PHYSICAL THERAPY DAILY NOTE Time In: 3354 Time Out: 1430 Patient Seen For: AM;Gait training;Patient education; Therapeutic exercise;Transfer training; Other (see progress notes) Subjective: patient reporting her left hand is sore at times and if she tires to straighten her fingers they hurt. Reports she will have her daughter bring in her left hand splint from home Objective:Vital Signs: 
Patient Vitals for the past 12 hrs: 
 Temp Pulse Resp BP SpO2  
09/12/18 0806 98.2 °F (36.8 °C) 74 18 117/53 95 % 09/12/18 0756 - - - - 94 % 09/12/18 0507 - - - - 95 % Pain level:4 out of 10 Pain location:left fingers with attempts to straighten fingers and when attempting to  handle on rollator Pain interventions:Pain medication per RN, rest, positioning,Family to bring in splint Patient education:energy cosnervation,transfer training, gait training, fall precautions, activity pacing,body mechanics, rollator parts management, breathing techniques during gait training, Patient verbalizing understanding and demonstrating partial understanding of patient education. Recommend follow up education. Interdisciplinary Communication:NA Other (comment) (Low Vision, Fall Risk) GROSS ASSESSMENT Daily Assessment NA  
 
 
BED/MAT MOBILITY Daily Assessment Rolling Right : 0 (Not tested) Rolling Left : 0 (Not tested) Supine to Sit : 0 (Not tested) Sit to Supine : 0 (Not tested) TRANSFERS Daily Assessment Increased time and effort to complete with cues for body mechanics Transfer Type: SPT without device Transfer Assistance : 4 (Contact guard assistance) Sit to Stand Assistance: Contact guard assistance Car Transfers: Not tested GAIT Daily Assessment Cues for controlling respiratory rate with gait speed Amount of Assistance: 4 (Contact guard assistance) Distance (ft): 40 Feet (ft) (40FT X 1 WITH ROLLATOR,40FT X 1 W/O A DEVICE) Assistive Device: Gait belt;Walker, rollator Gait training with cues for upright posture and to improve step length. Initiated instructed in gait with rollator with cues for rollator parts management, upright posture and improved step length STEPS or STAIRS Daily Assessment NT  
 
 
BALANCE Daily Assessment Sitting - Static: Good (unsupported) Sitting - Dynamic: Fair (occasional) Standing - Static: Fair Standing - Dynamic : Impaired WHEELCHAIR MOBILITY Daily Assessment Able to Propel (ft): 0 feet Functional Level: 0 Curbs/Ramps Assist Required (FIM Score): 0 (Not tested) Wheelchair Setup Assist Required : 0 (Not tested) LOWER EXTREMITY EXERCISES Daily Assessment Increased time and effort to complete with multiple and frequent rest breaks. Cues for breathing pattern and correct form Extremity: Both Exercise Type #1: Seated lower extremity strengthening Sets Performed: 2 Reps Performed: 10 Level of Assist: Minimal assistance Assessment: recommend cont to assess gait with rollator vs ambulating without a device. Initial progress will be slow due to respiratory status and decreased functional endurance Patient returned to room at end of treatment and remained up in recliner with LEs elevated and with needs in reach. 02 at 3 lpm 
 
Plan of Care: Continue with POC and progress as tolerated. Latoya Moreno, PT 
9/12/2018

## 2018-09-12 NOTE — PROGRESS NOTES
OT Daily Note Time In 1301 Time Out 1350 Subjective: \"It's a ten and a half [out of 10 on pain scale]. \" [patient reports pain in R \"armpit\" and medial aspect of arm when prompted to demonstrate shoulder AROM] Pain: See above, patient reports no pain at rest in RUE however pain in L knee, RN notified to provide medication Precautions: Other (comment) (Low Vision, Fall Risk) Self-Care Patient transferred recliner to wheelchair SPT with CGA. Evaluation Patient seen in therapy gym for formal OT interview and evaluation, see [facility] Initial OT Evaluation note for details.  strength (dynamometer) scores as follows: 
RUE 23# LUE 9# Note attempted however unable to complete 9 Hole Peg Test bilaterally, however increased success using L hand to place 1 peg compared with R (requiring compensatory assist). Limited by ROM issues, pain, and hand deformities. Assessment: Patient tolerated well. See full Evaluation note for details Education: Results of Evaluation, goals for OT Interdisciplinary Communication: Collaborated with Kaden Tinsley and agreed patient is progressing well and on-track to meet goals as stated in [address] [facility] [address]. Plan: Continue to address ADL/IADL, functional mobility, activity tolerance, balance, strengthening, coordination, education, cognition.  
 
 
Jp Eastman, OTR/L

## 2018-09-12 NOTE — PROGRESS NOTES
Talked with MARLI ABDI in pharmacy con cering warfarin dosage. He stated another pharmacist was looking at the warfarin and will continue to bridge it with Lovenox until INR and PT was therapeutic.

## 2018-09-12 NOTE — PROGRESS NOTES
Warfarin dosing per pharmacist 
 
Jb Judith Saldaña is a 77 y.o. female. Indication:  S/p AVR Goal INR:  2-3 Home dose:  5 mg Sun and 2.5 mg all other days  (Weekly dose of 20 mg) Risk factors or significant drug interactions:  none Other anticoagulants:  Lovenox bridge Daily Monitoring Date  INR     Warfarin dose HGB              Notes 9/8  3.4  Hold  9.3 
9/9  2.8  Hold  9.0  
9/10  2.0  Hold  9.6 9/11  1.5  5 mg   9.5 9/12  1.3  5 mg  10.5 Pharmacy has been consulted to dose warfarin for this patient due to a history of AVR (mechanical). The INR goal per the patient's home warfarin clinic is 2-3. Patient initially presented with a supratherapeutic INR that has trended down. INR down again to 1.3 after being held for procedure. Continue 5 mg currently. Following daily INR. Thank you, Nadja Yeh, Pharm. D. Clinical Pharmacist 
[phone]

## 2018-09-13 LAB
ANION GAP SERPL CALC-SCNC: 11 MMOL/L (ref 7–16)
BUN SERPL-MCNC: 77 MG/DL (ref 8–23)
CALCIUM SERPL-MCNC: 9.3 MG/DL (ref 8.3–10.4)
CHLORIDE SERPL-SCNC: 92 MMOL/L (ref 98–107)
CO2 SERPL-SCNC: 33 MMOL/L (ref 21–32)
CREAT SERPL-MCNC: 2.48 MG/DL (ref 0.6–1)
GLUCOSE BLD STRIP.AUTO-MCNC: 135 MG/DL (ref 65–100)
GLUCOSE BLD STRIP.AUTO-MCNC: 141 MG/DL (ref 65–100)
GLUCOSE BLD STRIP.AUTO-MCNC: 208 MG/DL (ref 65–100)
GLUCOSE BLD STRIP.AUTO-MCNC: 98 MG/DL (ref 65–100)
GLUCOSE SERPL-MCNC: 132 MG/DL (ref 65–100)
INR PPP: 1.3
POTASSIUM SERPL-SCNC: 4.7 MMOL/L (ref 3.5–5.1)
PROTHROMBIN TIME: 15.8 SEC (ref 11.5–14.5)
SODIUM SERPL-SCNC: 136 MMOL/L (ref 136–145)

## 2018-09-13 PROCEDURE — 99232 SBSQ HOSP IP/OBS MODERATE 35: CPT | Performed by: PHYSICAL MEDICINE & REHABILITATION

## 2018-09-13 PROCEDURE — 85610 PROTHROMBIN TIME: CPT

## 2018-09-13 PROCEDURE — 97530 THERAPEUTIC ACTIVITIES: CPT

## 2018-09-13 PROCEDURE — 94640 AIRWAY INHALATION TREATMENT: CPT

## 2018-09-13 PROCEDURE — 94760 N-INVAS EAR/PLS OXIMETRY 1: CPT

## 2018-09-13 PROCEDURE — 82962 GLUCOSE BLOOD TEST: CPT

## 2018-09-13 PROCEDURE — 36415 COLL VENOUS BLD VENIPUNCTURE: CPT

## 2018-09-13 PROCEDURE — 80048 BASIC METABOLIC PNL TOTAL CA: CPT

## 2018-09-13 PROCEDURE — 97110 THERAPEUTIC EXERCISES: CPT

## 2018-09-13 PROCEDURE — 74011250636 HC RX REV CODE- 250/636: Performed by: PHYSICAL MEDICINE & REHABILITATION

## 2018-09-13 PROCEDURE — 65310000000 HC RM PRIVATE REHAB

## 2018-09-13 PROCEDURE — 74011000250 HC RX REV CODE- 250: Performed by: PHYSICAL MEDICINE & REHABILITATION

## 2018-09-13 PROCEDURE — 97150 GROUP THERAPEUTIC PROCEDURES: CPT

## 2018-09-13 PROCEDURE — 97535 SELF CARE MNGMENT TRAINING: CPT

## 2018-09-13 PROCEDURE — 74011636637 HC RX REV CODE- 636/637: Performed by: PHYSICAL MEDICINE & REHABILITATION

## 2018-09-13 PROCEDURE — 97116 GAIT TRAINING THERAPY: CPT

## 2018-09-13 PROCEDURE — 74011250637 HC RX REV CODE- 250/637: Performed by: PHYSICAL MEDICINE & REHABILITATION

## 2018-09-13 RX ORDER — TRAZODONE HYDROCHLORIDE 50 MG/1
25-50 TABLET ORAL
Status: DISCONTINUED | OUTPATIENT
Start: 2018-09-13 | End: 2018-09-23 | Stop reason: HOSPADM

## 2018-09-13 RX ADMIN — ESCITALOPRAM OXALATE 20 MG: 10 TABLET ORAL at 08:14

## 2018-09-13 RX ADMIN — ENOXAPARIN SODIUM 100 MG: 100 INJECTION SUBCUTANEOUS at 17:43

## 2018-09-13 RX ADMIN — PANTOPRAZOLE SODIUM 40 MG: 40 TABLET, DELAYED RELEASE ORAL at 05:51

## 2018-09-13 RX ADMIN — OXYCODONE HYDROCHLORIDE 10 MG: 5 TABLET ORAL at 09:41

## 2018-09-13 RX ADMIN — INSULIN LISPRO 4 UNITS: 100 INJECTION, SOLUTION INTRAVENOUS; SUBCUTANEOUS at 22:23

## 2018-09-13 RX ADMIN — ALBUTEROL SULFATE 2.5 MG: 2.5 SOLUTION RESPIRATORY (INHALATION) at 11:03

## 2018-09-13 RX ADMIN — SIMVASTATIN 20 MG: 20 TABLET, FILM COATED ORAL at 22:23

## 2018-09-13 RX ADMIN — ASPIRIN 81 MG: 81 TABLET, COATED ORAL at 08:15

## 2018-09-13 RX ADMIN — TIOTROPIUM BROMIDE 18 MCG: 18 CAPSULE ORAL; RESPIRATORY (INHALATION) at 05:30

## 2018-09-13 RX ADMIN — ALBUTEROL SULFATE 2.5 MG: 2.5 SOLUTION RESPIRATORY (INHALATION) at 17:18

## 2018-09-13 RX ADMIN — FUROSEMIDE 40 MG: 40 TABLET ORAL at 17:43

## 2018-09-13 RX ADMIN — Medication 10 ML: at 22:29

## 2018-09-13 RX ADMIN — FUROSEMIDE 40 MG: 40 TABLET ORAL at 08:15

## 2018-09-13 RX ADMIN — POLYETHYLENE GLYCOL 3350 17 G: 17 POWDER, FOR SOLUTION ORAL at 08:14

## 2018-09-13 RX ADMIN — BUDESONIDE 500 MCG: 0.5 INHALANT RESPIRATORY (INHALATION) at 05:28

## 2018-09-13 RX ADMIN — TRAMADOL HYDROCHLORIDE 50 MG: 50 TABLET, FILM COATED ORAL at 04:13

## 2018-09-13 RX ADMIN — ALBUTEROL SULFATE 2.5 MG: 2.5 SOLUTION RESPIRATORY (INHALATION) at 05:28

## 2018-09-13 RX ADMIN — ISOSORBIDE MONONITRATE 30 MG: 30 TABLET, EXTENDED RELEASE ORAL at 08:15

## 2018-09-13 RX ADMIN — CARVEDILOL 6.25 MG: 6.25 TABLET, FILM COATED ORAL at 08:15

## 2018-09-13 RX ADMIN — OXYCODONE HYDROCHLORIDE AND ACETAMINOPHEN 500 MG: 500 TABLET ORAL at 08:14

## 2018-09-13 RX ADMIN — CARVEDILOL 6.25 MG: 6.25 TABLET, FILM COATED ORAL at 16:52

## 2018-09-13 RX ADMIN — TRAZODONE HYDROCHLORIDE 50 MG: 50 TABLET ORAL at 22:23

## 2018-09-13 RX ADMIN — Medication 10 ML: at 14:56

## 2018-09-13 RX ADMIN — BUDESONIDE 500 MCG: 0.5 INHALANT RESPIRATORY (INHALATION) at 17:18

## 2018-09-13 RX ADMIN — WARFARIN SODIUM 5 MG: 5 TABLET ORAL at 17:43

## 2018-09-13 NOTE — PROGRESS NOTES
Assist patient up in bed for breakfast. Pt c/o about getting Lasix 40 mg BID. Pt stated she talked with Dr. Sandy Anderson about receiving Lasix . Pt A/O x 4 she voices  pain to left leg is about a 8/10 right now. Respiration even and non labored. pt on 3 L N/C. Sats 98 %. Dressing to left leg C/D/I. Pt fingers on both has are contracted, she is limited in holding objects. Pt is incontinent to bladder. She states she can't get to the [facility] fast enough. She has bowel sounds in all 4 quadrants. BS this AM was 98. INR 1.3 PT 15.8. Pt is taking coumadin 5 mg po and Lovenox 100 mg Injection.

## 2018-09-13 NOTE — PROGRESS NOTES
End Of Shift Functional Summary, Nursing TOILETING:   
Does patient need assist with adjusting pants up or down and/or pericare? yes If yes, please indicate what the patient needs help with:  Pulling pants up  (i.e. pants up, pants down, pericare) Pt uses walker. Does the patient require extra time? yes Does the patient require standby assistance? yes Does the patient require contact guard assistance? no 
Does the patient require more than one staff member for assistance? no 
 
TOILET TRANSFER:   
Pt requires standby assistance/setup. Pt uses raised toilet seat. Does the patient require extra time? yes Does the patient require contact guard assistance? no 
Does the patient require more than one staff member for assistance? no 
 
BLADDER:   
Pt does not have a medina catheter that staff manages. Pt does not take medication. If so, please indicate which medication:   
Pt is continent. of bladder and voids in bedside commode  Pt requires staff to empty device Pt has had 0 bladder accidents during this shift.  (An accident is when the episode is not contained in a brief AND/OR the clothing/linen requires changing/cleaning up.) BOWEL:  Pt does take medication. Pt is continent of bowel and uses bedside commode. Pt requires staff to empty device    Pt has had 0 bowel accidents during this shift . (An accident is when the episode is not contained in a brief AND/OR the clothing/linen requires changing/cleaning up.) BED/CHAIR TRANSFER Pt requires standby assistance/setup. Pt uses walker Does the patient require extra time? yes Does the patient require contact guard assistance? no 
Does the patient require more than one staff member for assistance? no 
 
 
EATING Pt requires no assistance. Pt does not wear dentures. TUBE FEEDINGS:  Pt does not  receive nutrition through tube feedings. Patient requires no assistance with feedings. Documentation reviewed and plan of care discussed/reviewed with  
patient during the shift.

## 2018-09-13 NOTE — PROGRESS NOTES
09/13/18 3410 Bathing Functional Level 4 Body Parts Patient Bathed Thigh, right;Thigh, left;Aura area; Chest;Buttocks;Arm, left; Abdomen;Arm, right Comments Patient educated regarding bathing mitt this AM and able to maintain functional grasp on washcloth in L hand with increased success, assist to bathe B feet d/t time expiring Upper Body Dressing/Undressing Functional Level 5 Items Applied/Steps Completed Pullover (4 steps) Comments Increased time and effort utilizing premorbid adaptive technique Lower Body Dressing/Undressing Functional Level 4 Items Applied/Steps Completed Sock, right (1 step); Sock, left (1 step); Elastic waist pants (3 steps) Comments Patient educated regarding dressing stick and able to doff B socks, increased success seated EOB bending up LEs to side one at a time to manage clothing over feet this AM.  Able to stand with supervision-CGA to manage pants over hips Bed/Chair/Wheelchair Transfers Supine to Sit  Min A Sit to Stand Assistance Supervision Transfer Assist Score 4 Transfer Type SPT without device Comments CGA  
 
 
S: \"That'll work Baby. \" [patient educated regarding dressing stick and bathing mitt this session, increased success for ADLs with AE] Agreeable to therapy. Focus of session was on ADL and functional mobility. Patient was able to transfer bed to wheelchair SPT with CGA-supervision. Pain in L knee, 6/10. RN provided medication. Collaborated with Ann Marie LEWIS and confirmed patient is on track to reach goals as documented in the care plan. Patient tolerated session well, but activity tolerance, balance, functional mobility, strength, FMC are still below baseline and require skilled facilitation to successfully and safely complete ADL's and transfers. Patient ended session in wheelchair with Ann Marie LEWIS.   
 
Nathaniel Spring, OTR/L

## 2018-09-13 NOTE — PROGRESS NOTES
Hourly rounds completed this shift. Patient resting in bed. Patient given P.O. Pain medication per [facility] twice this shift. No needs stated at this time. Will continue to monitor and give report to oncoming RN.

## 2018-09-13 NOTE — PROGRESS NOTES
PHYSICAL THERAPY DAILY NOTE Time In: 1513 Time Out: 1515 Patient Seen For: PM;Gait training;Patient education; Therapeutic exercise;Transfer training; Other (see progress notes) Subjective: patient reporting she feels OK. Reports she likes walking with the rollator. Reports she feels like her breathing is getting better Objective:Vital Signs:patient on 02 at 3 lpm. 02 sat 93 to 95% during treatment Patient Vitals for the past 12 hrs: 
 Temp Pulse Resp BP SpO2  
09/13/18 1103 - - - - 96 % 09/13/18 0830 98.1 °F (36.7 °C) 71 16 117/86 95 % 09/13/18 0531 - - - - 95 % Pain level:4 out of 10 Pain location:left fingers Pain interventions:Pain medication per RN, rest, positioning Patient education:energy conservation,transfer training, gait training, fall precautions, activity pacing, body mechanics, rollator parts management,breathing techniques during ambulation, Patient verbalizing understanding and demonstrating partial understanding of patient education. Recommend follow up education. Interdisciplinary Communication:NA Other (comment) (Low Vision, Fall Risk) GROSS ASSESSMENT Daily Assessment NA  
 
 
BED/MAT MOBILITY Daily Assessment Rolling Right : 0 (Not tested) Rolling Left : 0 (Not tested) Supine to Sit : 0 (Not tested) Sit to Supine : 0 (Not tested) TRANSFERS Daily Assessment Increased time and effort to complete with cues for body mechanics and rollator parts management Transfer Type: SPT with walker (with rollator) Transfer Assistance : 5 (Stand-by assistance) Sit to Stand Assistance: Stand-by assistance Car Transfers: Not tested GAIT Daily Assessment Amount of Assistance: 5 (Stand-by assistance) Distance (ft): 80 Feet (ft) (80ft x 1  60ft x 1  30ft x 2) Assistive Device: Gait belt;Walker, rollator Gait training for long distance gait facilitating upright posture and coordination breathing pattern with gait speed. Gait training for short distance gait instructing patient on how to manage 02 line when ambulating with rollator. Cues to ensure patient does not run over line with rollator or trip oer line or get line wrapped up around her legs STEPS or STAIRS Daily Assessment Steps/Stairs Ambulated (#): 0 Level of Assist : 0 (Not tested) BALANCE Daily Assessment Sitting - Static: Good (unsupported) Sitting - Dynamic: Fair (occasional) Standing - Static: Fair Standing - Dynamic : Impaired WHEELCHAIR MOBILITY Daily Assessment Curbs/Ramps Assist Required (FIM Score): 0 (Not tested) Wheelchair Setup Assist Required : 0 (Not tested) LOWER EXTREMITY EXERCISES Daily Assessment Extremity: Both Exercise Type #1: Other (comment) (LE motomed x 10 mins at level 2) Level of Assist: Supervision Assessment: Progressing towards goals. Patient returned to room at end of treatment and remained up in recliner with LEs elevated and with needs in reach. 02 at 3 lpm 
 
Plan of Care: Continue with POC and progress as tolerated. Kelly Austin, PT 
9/13/2018

## 2018-09-13 NOTE — PROGRESS NOTES
Warfarin dosing per pharmacist 
 
Elbert Malin Rod Rosenberg is a 77 y.o. female. Indication:  S/p AVR Goal INR:  2-3 Home dose:  5 mg Sun and 2.5 mg all other days  (Weekly dose of 20 mg) Risk factors or significant drug interactions:  none Other anticoagulants:  Lovenox bridge Daily Monitoring Date  INR     Warfarin dose HGB              Notes 9/8  3.4  Hold  9.3 
9/9  2.8  Hold  9.0  
9/10  2.0  Hold  9.6 9/11  1.5  5 mg   9.5 9/12  1.3  5 mg  10.5 9/13  1.3  5 mg  -- Pharmacy has been consulted to dose warfarin for this patient due to a history of AVR (mechanical). The INR goal per the patient's home warfarin clinic is 2-3. Patient initially presented with a supratherapeutic INR that has trended down. INR 1.3. Continue 5 mg currently, expect INR to start trending up tomorrow. Following daily INR. Thank you, Juan A Perrin, Pharm. D. Clinical Pharmacist 
[phone]

## 2018-09-13 NOTE — PROGRESS NOTES
Ron Salas MD, Medical Director Marcel Schwartz [address], [address] [facility] Tel: [phone] SFD PROGRESS NOTE [address] Admit Date: 9/11/2018 Admit Diagnosis: debility; Physical debility Subjective Slept a bit better but asking for Melatonin. Tolerates Trilogy at [employer] . She feels like she really doesn't understand what REJI is. ' no one has ever really explained it. ' We discussed in length, all questions answered to pts satisfaction 
= PRUETT but thinks she did well with therapies. Objective:  
 
Current Facility-Administered Medications Medication Dose Route Frequency  traZODone (DESYREL) tablet 25-50 mg  25-50 mg Oral QHS  furosemide (LASIX) tablet 40 mg  40 mg Oral BID  
 0.9% sodium chloride infusion 250 mL  250 mL IntraVENous PRN  
 acetaminophen (TYLENOL) tablet 650 mg  650 mg Oral Q6H PRN  
 sodium chloride (NS) flush 5-10 mL  5-10 mL IntraVENous Q8H  
 sodium chloride (NS) flush 5-10 mL  5-10 mL IntraVENous PRN  
 albuterol (PROVENTIL VENTOLIN) nebulizer solution 2.5 mg  2.5 mg Nebulization Q4H PRN  
 alum-mag hydroxide-simeth (MYLANTA) oral suspension 30 mL  30 mL Oral Q4H PRN  
 ascorbic acid (vitamin C) (VITAMIN C) tablet 500 mg  500 mg Oral DAILY  aspirin delayed-release tablet 81 mg  81 mg Oral DAILY  bisacodyl (DULCOLAX) suppository 10 mg  10 mg Rectal DAILY PRN  
 budesonide (PULMICORT) 500 mcg/2 ml nebulizer suspension  500 mcg Nebulization BID RT And  
 albuterol (PROVENTIL VENTOLIN) nebulizer solution 2.5 mg  2.5 mg Nebulization Q6HWA RT  
 carvedilol (COREG) tablet 6.25 mg  6.25 mg Oral BID WITH MEALS  enoxaparin (LOVENOX) injection 100 mg  100 mg SubCUTAneous Q24H  
 escitalopram oxalate (LEXAPRO) tablet 20 mg  20 mg Oral DAILY  influenza vaccine 2018-19 (6 mos+)(PF) (FLUARIX QUAD/FLULAVAL QUAD) injection 0.5 mL  0.5 mL IntraMUSCular PRIOR TO DISCHARGE  
  insulin lispro (HUMALOG) injection   SubCUTAneous AC&HS  isosorbide mononitrate ER (IMDUR) tablet 30 mg  30 mg Oral DAILY  morphine 10 mg/mL injection 2 mg  2 mg IntraVENous Q4H PRN  
 ondansetron (ZOFRAN ODT) tablet 4 mg  4 mg Oral Q6H PRN  
 oxyCODONE IR (ROXICODONE) tablet 5-10 mg  5-10 mg Oral Q4H PRN  pantoprazole (PROTONIX) tablet 40 mg  40 mg Oral ACB  polyethylene glycol (MIRALAX) packet 17 g  17 g Oral DAILY PRN  
 simvastatin (ZOCOR) tablet 20 mg  20 mg Oral QHS  tiotropium (SPIRIVA) inhalation capsule 18 mcg  18 mcg Inhalation DAILY  traMADol (ULTRAM) tablet 50 mg  50 mg Oral Q4H PRN  
 warfarin (COUMADIN) tablet 5 mg  5 mg Oral QPM  
 
Review of Systems:Denies chest pain, shortness of breath, cough, headache, abdominal pain, dysurea, calf pain. Pertinent positives are as noted in the medical records and unremarkable otherwise.  
+blurred vision, chronic Visit Vitals  /86  Pulse 71  Temp 98.1 °F (36.7 °C)  Resp 16  SpO2 95% Physical Exam:  
General: Alert and age appropriately oriented. No acute cardio respiratory distress. HEENT: Normocephalic,no scleral icterus Oral mucosa moist without cyanosis Lungs: Clear to auscultation  bilaterally. Respiration even and unlabored Heart: Regular rate and rhythm, S1, S2 No  murmurs, clicks, rub or gallops Abdomen: Soft, non-tender, nondistended. Bowel sounds present. No organomegaly. obese Genitourinary: Benign . Neuromuscular:  
 
 Generalized prox> distal weakness Non focal; arthritic changes in hands, decreased fine motor coordination and  strength bilaterally Skin/extremity: No rashes, no erythema. No calf tenderness BLE Wound covered left knee; 1 edema Functional Assessment: 
   
   
Balance Sitting - Static: Good (unsupported) (09/12/18 1616) Sitting - Dynamic: Fair (occasional) (09/12/18 1616) Standing - Static: Fair (09/12/18 1616) Standing - Dynamic : Impaired (09/12/18 1616) Clifton Gomes Fall Risk Assessment: 
Eri Ayala Mobility: Ambulates or transfers with assist devices or assistance (09/13/18 0943) Mobility Interventions: Communicate number of staff needed for ambulation/transfer;Utilize gait belt for transfers/ambulation;Utilize walker, cane, or other assistive device; Patient to call before getting OOB (09/13/18 42) Mentation: Alert, oriented x 3 (09/13/18 0516) Medication: Patient receiving anticonvulsants, sedatives(tranquilizers), psychotropics or hypnotics, hypoglycemics, narcotics, sleep aids, antihypertensives, laxatives, or diuretics (09/13/18 7549) Medication Interventions: Patient to call before getting OOB; Teach patient to arise slowly;Utilize gait belt for transfers/ambulation; Evaluate medications/consider consulting pharmacy (09/13/18 5986) Elimination: Needs assistance with toileting (09/13/18 3041) Elimination Interventions: Patient to call for help with toileting needs; Elevated toilet seat;Call light in reach; Toileting schedule/hourly rounds (09/13/18 0301) Prior Fall History: Before admission in past 12 months _home or previous inpatient care) (09/13/18 1742) History of Falls Interventions: Evaluate medications/consider consulting pharmacy; Room close to nurse's station;Utilize gait belt for transfer/ambulation;Bed/chair exit alarm (09/13/18 2917) Total Score: 4 (09/13/18 3084) High Fall Risk: Yes (09/13/18 1271) Speech Assessment: 
    
 
Ambulation: 
Gait Distance (ft): 40 Feet (ft) (40FT X 1 WITH ROLLATOR,40FT X 1 W/O A DEVICE) (09/12/18 1616) Assistive Device: Gait belt;Walker, rollator (09/12/18 1616) Rail Use: Right  (09/12/18 1600) Labs/Studies: 
Recent Results (from the past 72 hour(s)) GLUCOSE, POC Collection Time: 09/10/18 11:13 AM  
Result Value Ref Range Glucose (POC) 153 (H) 65 - 100 mg/dL GLUCOSE, POC  
 Collection Time: 09/10/18  4:26 PM  
Result Value Ref Range Glucose (POC) 157 (H) 65 - 100 mg/dL GLUCOSE, POC Collection Time: 09/10/18  9:00 PM  
Result Value Ref Range Glucose (POC) 115 (H) 65 - 100 mg/dL CBC WITH AUTOMATED DIFF Collection Time: 09/11/18  3:59 AM  
Result Value Ref Range WBC 8.4 4.3 - 11.1 K/uL  
 RBC 3.28 (L) 4.05 - 5.2 M/uL HGB 9.5 (L) 11.7 - 15.4 g/dL HCT 30.4 (L) 35.8 - 46.3 % MCV 92.7 79.6 - 97.8 FL  
 MCH 29.0 26.1 - 32.9 PG  
 MCHC 31.3 (L) 31.4 - 35.0 g/dL  
 RDW 15.4 % PLATELET 058 087 - 242 K/uL MPV 10.1 9.4 - 12.3 FL ABSOLUTE NRBC 0.00 0.0 - 0.2 K/uL  
 DF AUTOMATED NEUTROPHILS 60 43 - 78 % LYMPHOCYTES 28 13 - 44 % MONOCYTES 10 4.0 - 12.0 % EOSINOPHILS 2 0.5 - 7.8 % BASOPHILS 0 0.0 - 2.0 % IMMATURE GRANULOCYTES 0 0.0 - 5.0 %  
 ABS. NEUTROPHILS 5.0 1.7 - 8.2 K/UL  
 ABS. LYMPHOCYTES 2.3 0.5 - 4.6 K/UL  
 ABS. MONOCYTES 0.8 0.1 - 1.3 K/UL  
 ABS. EOSINOPHILS 0.2 0.0 - 0.8 K/UL  
 ABS. BASOPHILS 0.0 0.0 - 0.2 K/UL  
 ABS. IMM. GRANS. 0.0 0.0 - 0.5 K/UL METABOLIC PANEL, BASIC Collection Time: 09/11/18  3:59 AM  
Result Value Ref Range Sodium 142 136 - 145 mmol/L Potassium 4.4 3.5 - 5.1 mmol/L Chloride 92 (L) 98 - 107 mmol/L  
 CO2 42 (HH) 21 - 32 mmol/L Anion gap 8 7 - 16 mmol/L Glucose 120 (H) 65 - 100 mg/dL BUN 62 (H) 8 - 23 MG/DL Creatinine 2.05 (H) 0.6 - 1.0 MG/DL  
 GFR est AA 31 (L) >60 ml/min/1.73m2 GFR est non-AA 25 (L) >60 ml/min/1.73m2 Calcium 9.7 8.3 - 10.4 MG/DL PROTHROMBIN TIME + INR Collection Time: 09/11/18  3:59 AM  
Result Value Ref Range Prothrombin time 17.1 (H) 11.5 - 14.5 sec INR 1.5 GLUCOSE, POC Collection Time: 09/11/18  7:42 AM  
Result Value Ref Range Glucose (POC) 117 (H) 65 - 100 mg/dL PLEASE READ & DOCUMENT PPD TEST IN 48 HRS Collection Time: 09/11/18  9:27 AM  
Result Value Ref Range PPD  neggative Negative  
 mm Induration  0 mm GLUCOSE, POC Collection Time: 09/11/18 11:02 AM  
Result Value Ref Range Glucose (POC) 155 (H) 65 - 100 mg/dL GLUCOSE, POC Collection Time: 09/11/18  4:27 PM  
Result Value Ref Range Glucose (POC) 106 (H) 65 - 100 mg/dL GLUCOSE, POC Collection Time: 09/11/18  8:35 PM  
Result Value Ref Range Glucose (POC) 186 (H) 65 - 100 mg/dL CBC WITH AUTOMATED DIFF Collection Time: 09/12/18  6:11 AM  
Result Value Ref Range WBC 7.8 4.3 - 11.1 K/uL  
 RBC 3.58 (L) 4.05 - 5.2 M/uL  
 HGB 10.5 (L) 11.7 - 15.4 g/dL HCT 33.8 (L) 35.8 - 46.3 % MCV 94.4 79.6 - 97.8 FL  
 MCH 29.3 26.1 - 32.9 PG  
 MCHC 31.1 (L) 31.4 - 35.0 g/dL  
 RDW 15.9 % PLATELET 577 857 - 640 K/uL MPV 10.3 9.4 - 12.3 FL ABSOLUTE NRBC 0.00 0.0 - 0.2 K/uL  
 DF AUTOMATED NEUTROPHILS 64 43 - 78 % LYMPHOCYTES 24 13 - 44 % MONOCYTES 9 4.0 - 12.0 % EOSINOPHILS 2 0.5 - 7.8 % BASOPHILS 1 0.0 - 2.0 % IMMATURE GRANULOCYTES 0 0.0 - 5.0 %  
 ABS. NEUTROPHILS 5.0 1.7 - 8.2 K/UL  
 ABS. LYMPHOCYTES 1.9 0.5 - 4.6 K/UL  
 ABS. MONOCYTES 0.7 0.1 - 1.3 K/UL  
 ABS. EOSINOPHILS 0.2 0.0 - 0.8 K/UL  
 ABS. BASOPHILS 0.0 0.0 - 0.2 K/UL  
 ABS. IMM. GRANS. 0.0 0.0 - 0.5 K/UL METABOLIC PANEL, BASIC Collection Time: 09/12/18  6:11 AM  
Result Value Ref Range Sodium 141 136 - 145 mmol/L Potassium 4.2 3.5 - 5.1 mmol/L Chloride 92 (L) 98 - 107 mmol/L  
 CO2 39 (H) 21 - 32 mmol/L Anion gap 10 7 - 16 mmol/L Glucose 97 65 - 100 mg/dL BUN 66 (H) 8 - 23 MG/DL Creatinine 2.35 (H) 0.6 - 1.0 MG/DL  
 GFR est AA 26 (L) >60 ml/min/1.73m2 GFR est non-AA 22 (L) >60 ml/min/1.73m2 Calcium 10.4 8.3 - 10.4 MG/DL MAGNESIUM Collection Time: 09/12/18  6:11 AM  
Result Value Ref Range Magnesium 2.2 1.8 - 2.4 mg/dL PROTHROMBIN TIME + INR Collection Time: 09/12/18  6:11 AM  
Result Value Ref Range Prothrombin time 15.8 (H) 11.5 - 14.5 sec INR 1.3 GLUCOSE, POC  
 Collection Time: 09/12/18  7:36 AM  
Result Value Ref Range Glucose (POC) 119 (H) 65 - 100 mg/dL GLUCOSE, POC Collection Time: 09/12/18 11:36 AM  
Result Value Ref Range Glucose (POC) 121 (H) 65 - 100 mg/dL GLUCOSE, POC Collection Time: 09/12/18  4:31 PM  
Result Value Ref Range Glucose (POC) 137 (H) 65 - 100 mg/dL GLUCOSE, POC Collection Time: 09/12/18  8:38 PM  
Result Value Ref Range Glucose (POC) 219 (H) 65 - 100 mg/dL PROTHROMBIN TIME + INR Collection Time: 09/13/18  5:48 AM  
Result Value Ref Range Prothrombin time 15.8 (H) 11.5 - 14.5 sec INR 1.3 GLUCOSE, POC Collection Time: 09/13/18  7:24 AM  
Result Value Ref Range Glucose (POC) 98 65 - 100 mg/dL Assessment:  
 
Problem List as of 9/13/2018  Date Reviewed: 8/31/2018 Codes Class Noted - Resolved Coagulopathy ([facility] INR >4 on admission 9/7/18 ICD-10-CM: D68.9 ICD-9-CM: 286.9  9/8/2018 - Present Traumatic hematoma of left knee ICD-10-CM: M76.57DY ICD-9-CM: 924.11  9/8/2018 - Present Debility ICD-10-CM: R53.81 ICD-9-CM: 799.3  9/8/2018 - Present Anemia ICD-10-CM: D64.9 ICD-9-CM: 285.9  9/7/2018 - Present Chronic respiratory failure with hypoxia (HCC) (Chronic) ICD-10-CM: J96.11 
ICD-9-CM: 518.83, 799.02  9/7/2018 - Present Acute kidney injury superimposed on chronic kidney disease ([facility] ICD-10-CM: N17.9, N18.9 ICD-9-CM: 866.00, 585.9  9/7/2018 - Present Encounter for immunization ICD-10-CM: K89 ICD-9-CM: V03.89  8/21/2018 - Present Obesity, morbid ([facility] ICD-10-CM: E66.01 
ICD-9-CM: 278.01  6/8/2018 - Present * (Principal)Physical debility ICD-10-CM: R53.81 ICD-9-CM: 799.3  5/2/2018 - Present Closed nondisplaced fracture of shaft of fifth metacarpal bone of left hand ICD-10-CM: S54.645Y ICD-9-CM: 815.03  4/28/2018 - Present Subdural hematoma (HCC) ICD-10-CM: I62.00 ICD-9-CM: 432.1  4/27/2018 - Present Long term (current) use of anticoagulants (Chronic) ICD-10-CM: Z79.01 
ICD-9-CM: V58.61  4/19/2018 - Present Dysthymia ICD-10-CM: F34.1 ICD-9-CM: 300.4  4/5/2018 - Present Type 2 diabetes mellitus with nephropathy (HCC) (Chronic) ICD-10-CM: E11.21 
ICD-9-CM: 250.40, 583.81  12/18/2017 - Present Pulmonary hypertension (HCC) (Chronic) ICD-10-CM: I27.20 ICD-9-CM: 416.8  6/15/2016 - Present S/P AVR (aortic valve replacement) (Chronic) ICD-10-CM: Z95.2 ICD-9-CM: V43.3  Unknown - Present Overview Signed 2/23/2016 11:04 AM by Robert Saha Mechanical/Artific Cardiomyopathy ([facility] (Chronic) ICD-10-CM: I42.9 ICD-9-CM: 425.4  Unknown - Present Osteopenia ICD-10-CM: M85.80 ICD-9-CM: 733.90  Unknown - Present HLD (hyperlipidemia) (Chronic) ICD-10-CM: T39.3 ICD-9-CM: 272.4  Unknown - Present Osteoarthritis ICD-10-CM: M19.90 ICD-9-CM: 715.90  Unknown - Present  
   
 ICD (implantable cardioverter-defibrillator) in place ICD-10-CM: Z95.810 ICD-9-CM: V45.02  10/2/2014 - Present Overview Signed 10/2/2014  4:58 PM by Teddy Heredia III [employer] single-chamber ICD implantation 10/20/14 Diabetes mellitus type 2, diet-controlled (HCC) (Chronic) ICD-10-CM: E11.9 ICD-9-CM: 250.00  8/28/2014 - Present COPD (chronic obstructive pulmonary disease) (HCC) (Chronic) ICD-10-CM: J44.9 ICD-9-CM: 472  4/2/2014 - Present REJI (obstructive sleep apnea)-cpap (Chronic) ICD-10-CM: G47.33 
ICD-9-CM: 327.23  4/2/2014 - Present CKD (chronic kidney disease) stage 3, GFR 30-59 ml/min (Chronic) ICD-10-CM: N18.3 ICD-9-CM: 585.3  7/10/2013 - Present Anticoagulated on Coumadin (Chronic) ICD-10-CM: Z51.81, Z79.01 
ICD-9-CM: V58.83, V58.61  7/9/2013 - Present Overview Signed 7/9/2013  4:16 PM by Albina Chance NP  
  S/P AVR 
  
  
   
 CAD (coronary artery disease) (Chronic) ICD-10-CM: I25.10 ICD-9-CM: 414.00  1/20/2013 - Present Overview Signed 5/20/2014 10:05 AM by Lauren Rae NP  
  5/8/14 PCI LAD with stent placed Chronic combined systolic and diastolic heart failure (HCC) (Chronic) ICD-10-CM: I50.42 
ICD-9-CM: 428.42  1/20/2013 - Present Overview Addendum 4/28/2018  4:53 AM by Heidy Paulson MD  
  5/8/14 ECHO:  EF 10-15% 12/2017:  EF 25-30% Essential hypertension, benign (Chronic) ICD-10-CM: I10 
ICD-9-CM: 401.1  1/20/2013 - Present MDS (myelodysplastic syndrome) (HCC) (Chronic) ICD-10-CM: D46.9 ICD-9-CM: 238.75  12/17/2011 - Present Overview Addendum 8/29/2013 11:06 AM by Tammy Whipple Procrit started in August, 2011 12/18/11 Procrit weekly and Iron stores. 5-12 12-13-12 good response to 3 weekly procrit did not need it last time 3-7-13 Pt doing well. Just wanted a \"check-up. \" Responding to Procrit every three weeks. 8-29-13 patient has missed some injections on recently restarted hemoglobin only issue , takes oral iron iron stores each time Iron deficiency anemia due to chronic blood loss (Chronic) ICD-10-CM: D50.0 ICD-9-CM: 280.0  7/29/2009 - Present RESOLVED: Routine general medical examination at a health care facility ICD-10-CM: Z00.00 ICD-9-CM: V70.0  12/16/2016 - 4/27/2018 Assessment: Profound Physical Debility s/p admitted with moderately severe anemia 
   
Continue daily physician medical management: 
Pneumonia prophylaxis- Incentive spirometer every hour while awake 
   
Chronic hypoxic respir failure; cont RT txs. On Trilogy at [facility]. Monitor O2 sats and cont O2 supplementation. Currently on 3L per [place] Insomnia; add Trazadone, or dtg can bring in her Melatonin 
   
DVT risk / DVT Prophylaxis- Will require daily physician exam to assess for signs and symptoms as patient is at increased risk for of thromboembolism. Mobilization as tolerated.  Intermittent pneumatic compression devices when in bed Thigh-high or knee-high thromboembolic deterrent hose when out of bed. No further anticoag is needed; on therapeutic Lovenox while bridging to coumadin 
-9/12 inr down 1.3 ;pharmacy dosing; 9/13 not changed ; now on 5mg coumadin; cont lovenox until therapeutic 
   
S/p mech AVR; chronic coumadin therapy but such hi risk of bleeding due to multiple falls.   
   
CHF; cont aldactone, coreg, imdur and lasix; 9/13 pt was on Lasix 80mg bid during acute stay, have dec to 40 mg bid; states she takes 20mg at home; monitor closely for signs of chf exacerb.  
   
Pain Control: ongoing significant pain in knees which is stable and controlled by PRN meds. Will require regular pain assessment and comprenhensive pain management,  
   
Wound Care: Monitor wound status daily per staff and physician. At risk for failure. Will require 24/7 rehab nursing. Keep wound clean and dry, Reinforce dressing PRN and Ice to area for comfort 
   
Hypertension - BP controlled, fluctuating, managed medically.  
   
Acute on chronic anemia; possible MDS followed by Dr Rosalino Olsen. Monitor closely, sp transfusion. Cont epocrit. Cont ferrous sulfate and vit C 
   
Depression; cont Lexapro. Depression regarding medical cond and fear of losing independence 
   
Diabetes mellitus - controlled. poor glycemic control. Will require daily, close FSG monitoring and medication adjustment to optimize glycemic control in setting of acute illness and hospitalization.  
-diet controlled so far 
   
Urinary retention/ neurogenic bladder - schedule voids q6-8 hrs. Check post-void residual as needed; In and Out catheterize if post-void residual is more than 400 cc. 
-having urinary incont; will try timed voids due to urgency 
   
CKD stg 3; creat 2.05 on admission to rehab. Was 1.55 yesterday.  Recheck in a.m and review meds contributing. 
-9/12 creat is increasing; dec lasix; recheck pending 9/13.  
   
bowel program - add bowel program prn 
    
GERD - add PPI. At times may need additional antacids, Maalox prn. Previous hx of GIB 
   
  
 
Time spent was 25 minutes with over 1/2 in direct patient care/examination, consultation and coordination of care. Signed By: Deisi Abbasi MD   
 September 13, 2018

## 2018-09-13 NOTE — PROGRESS NOTES
OT Daily Note Time In 1349 Time Out 1431 Subjective: \"This isn't working. .. I can't see. \" 
Pain: Not rated, therapy to tolerance Precautions: Other (comment) (Low Vision, Fall Risk) Self-Care Patient transferred recliner to wheelchair with HHA, ambulating x ~5 feet. Balance/Coordination Patient completed [facility] task seated/standing at tabletop. Note attempted task utilizing small plastic pegs with oversized heads (Chinese Checkers), patient required significantly increased time to locate holes on pegboard and place pegs d/t decreased central vision and DELTA [facility]. Task downgraded to medium sized wooden pegs, patient used LUE seated at tabletop to retrieve pegs from container to L side and insert into pegboard at midline in rows according to color. Patient required minimal cues for color discrimination, note decreased ability to complete horizontal adduction with LUE to access far edge of board. Patient completed standing trial at lowered tabletop (to adapt with R shoulder limitations and promoting balance and standing activity tolerance) to remove pegs from board using RUE and replace onto tray to R side of tabletop. Assessment: Patient tolerated well. Limited by decreased central vision/low vision and decreased ROM and DELTA [facility] in [facility], however patient reports this was near her UE/vision status prior to this hospital admission. Education: Purpose of therapy Interdisciplinary Communication: Collaborated with Hyacinth Ocampo and agreed patient is progressing well and on-track to meet goals as stated in [address]. Plan: Continue to address ADL/IADL, functional mobility, activity tolerance, balance, strengthening, coordination, education, cognition.  
 
 
Wilhemena Kidney, OTR/L

## 2018-09-13 NOTE — PROGRESS NOTES
Subjective \"I want to get back to where I was. \"  
Activity Evaluation Strength/Endurance Patient with low activity tolerance. She is limited by the use of her LUE due to pain. Balance Sit<->stand:  CGA with rollator Social Interaction Patient was friendly and conversational during the session. Cognitive A&O X4 Comments Patient is on 3 liter O2 via nasal canula. She is motivated and willing to participate with recreational therapy. Patient's Recreational Therapy evaluation completed under the [facility] navigation tool on 9/13/18. Please see for specifics regarding plan of care and goals. Patient prefers to be called \"Cholly. \"  Thank you for the referral. 
Nikki Benítez, CTRS

## 2018-09-13 NOTE — PROGRESS NOTES
Problem: Falls - Risk of 
Goal: *Absence of Falls Document Bo Donahue Fall Risk and appropriate interventions in the flowsheet. Outcome: Progressing Towards Goal 
Fall Risk Interventions: 
Mobility Interventions: Communicate number of staff needed for ambulation/transfer, OT consult for ADLs, Patient to call before getting OOB Medication Interventions: Evaluate medications/consider consulting pharmacy, Patient to call before getting OOB, Teach patient to arise slowly Elimination Interventions: Call light in reach, Patient to call for help with toileting needs, Toilet paper/wipes in reach, Toileting schedule/hourly rounds History of Falls Interventions: Door open when patient unattended, Evaluate medications/consider consulting pharmacy, Investigate reason for fall

## 2018-09-14 LAB
ANION GAP SERPL CALC-SCNC: 10 MMOL/L (ref 7–16)
BUN SERPL-MCNC: 74 MG/DL (ref 8–23)
CALCIUM SERPL-MCNC: 9.2 MG/DL (ref 8.3–10.4)
CHLORIDE SERPL-SCNC: 95 MMOL/L (ref 98–107)
CO2 SERPL-SCNC: 35 MMOL/L (ref 21–32)
CREAT SERPL-MCNC: 2.35 MG/DL (ref 0.6–1)
GLUCOSE BLD STRIP.AUTO-MCNC: 105 MG/DL (ref 65–100)
GLUCOSE BLD STRIP.AUTO-MCNC: 138 MG/DL (ref 65–100)
GLUCOSE BLD STRIP.AUTO-MCNC: 157 MG/DL (ref 65–100)
GLUCOSE BLD STRIP.AUTO-MCNC: 197 MG/DL (ref 65–100)
GLUCOSE SERPL-MCNC: 85 MG/DL (ref 65–100)
INR PPP: 1.3
POTASSIUM SERPL-SCNC: 3.8 MMOL/L (ref 3.5–5.1)
PROTHROMBIN TIME: 15.5 SEC (ref 11.5–14.5)
SODIUM SERPL-SCNC: 140 MMOL/L (ref 136–145)

## 2018-09-14 PROCEDURE — 94760 N-INVAS EAR/PLS OXIMETRY 1: CPT

## 2018-09-14 PROCEDURE — 74011250636 HC RX REV CODE- 250/636: Performed by: PHYSICAL MEDICINE & REHABILITATION

## 2018-09-14 PROCEDURE — 36415 COLL VENOUS BLD VENIPUNCTURE: CPT

## 2018-09-14 PROCEDURE — 82962 GLUCOSE BLOOD TEST: CPT

## 2018-09-14 PROCEDURE — 94640 AIRWAY INHALATION TREATMENT: CPT

## 2018-09-14 PROCEDURE — 99232 SBSQ HOSP IP/OBS MODERATE 35: CPT | Performed by: PHYSICAL MEDICINE & REHABILITATION

## 2018-09-14 PROCEDURE — 97110 THERAPEUTIC EXERCISES: CPT

## 2018-09-14 PROCEDURE — 97535 SELF CARE MNGMENT TRAINING: CPT

## 2018-09-14 PROCEDURE — 74011250637 HC RX REV CODE- 250/637: Performed by: PHYSICAL MEDICINE & REHABILITATION

## 2018-09-14 PROCEDURE — 74011636637 HC RX REV CODE- 636/637: Performed by: PHYSICAL MEDICINE & REHABILITATION

## 2018-09-14 PROCEDURE — 77010033678 HC OXYGEN DAILY

## 2018-09-14 PROCEDURE — 80048 BASIC METABOLIC PNL TOTAL CA: CPT

## 2018-09-14 PROCEDURE — 74011000250 HC RX REV CODE- 250: Performed by: PHYSICAL MEDICINE & REHABILITATION

## 2018-09-14 PROCEDURE — 85610 PROTHROMBIN TIME: CPT

## 2018-09-14 PROCEDURE — 97530 THERAPEUTIC ACTIVITIES: CPT

## 2018-09-14 PROCEDURE — 97116 GAIT TRAINING THERAPY: CPT

## 2018-09-14 PROCEDURE — 65310000000 HC RM PRIVATE REHAB

## 2018-09-14 RX ORDER — WARFARIN 7.5 MG/1
7.5 TABLET ORAL ONCE
Status: COMPLETED | OUTPATIENT
Start: 2018-09-14 | End: 2018-09-14

## 2018-09-14 RX ORDER — FUROSEMIDE 40 MG/1
40 TABLET ORAL DAILY
Status: DISCONTINUED | OUTPATIENT
Start: 2018-09-15 | End: 2018-09-23 | Stop reason: HOSPADM

## 2018-09-14 RX ORDER — WARFARIN SODIUM 5 MG/1
5 TABLET ORAL EVERY EVENING
Status: DISCONTINUED | OUTPATIENT
Start: 2018-09-15 | End: 2018-09-15

## 2018-09-14 RX ADMIN — TRAMADOL HYDROCHLORIDE 50 MG: 50 TABLET, FILM COATED ORAL at 16:43

## 2018-09-14 RX ADMIN — Medication 10 ML: at 21:41

## 2018-09-14 RX ADMIN — Medication 10 ML: at 15:48

## 2018-09-14 RX ADMIN — WARFARIN SODIUM 7.5 MG: 7.5 TABLET ORAL at 22:53

## 2018-09-14 RX ADMIN — FUROSEMIDE 40 MG: 40 TABLET ORAL at 08:20

## 2018-09-14 RX ADMIN — WARFARIN SODIUM 5 MG: 7.5 TABLET ORAL at 15:47

## 2018-09-14 RX ADMIN — BUDESONIDE 500 MCG: 0.5 INHALANT RESPIRATORY (INHALATION) at 18:22

## 2018-09-14 RX ADMIN — ESCITALOPRAM OXALATE 20 MG: 10 TABLET ORAL at 08:20

## 2018-09-14 RX ADMIN — OXYCODONE HYDROCHLORIDE AND ACETAMINOPHEN 500 MG: 500 TABLET ORAL at 08:20

## 2018-09-14 RX ADMIN — TRAMADOL HYDROCHLORIDE 50 MG: 50 TABLET, FILM COATED ORAL at 21:27

## 2018-09-14 RX ADMIN — CARVEDILOL 6.25 MG: 6.25 TABLET, FILM COATED ORAL at 08:20

## 2018-09-14 RX ADMIN — INSULIN LISPRO 2 UNITS: 100 INJECTION, SOLUTION INTRAVENOUS; SUBCUTANEOUS at 21:28

## 2018-09-14 RX ADMIN — ALBUTEROL SULFATE 2.5 MG: 2.5 SOLUTION RESPIRATORY (INHALATION) at 14:03

## 2018-09-14 RX ADMIN — SIMVASTATIN 20 MG: 20 TABLET, FILM COATED ORAL at 21:27

## 2018-09-14 RX ADMIN — ALBUTEROL SULFATE 2.5 MG: 2.5 SOLUTION RESPIRATORY (INHALATION) at 18:22

## 2018-09-14 RX ADMIN — Medication 10 ML: at 08:25

## 2018-09-14 RX ADMIN — ALBUTEROL SULFATE 2.5 MG: 2.5 SOLUTION RESPIRATORY (INHALATION) at 05:36

## 2018-09-14 RX ADMIN — ASPIRIN 81 MG: 81 TABLET, COATED ORAL at 08:20

## 2018-09-14 RX ADMIN — PANTOPRAZOLE SODIUM 40 MG: 40 TABLET, DELAYED RELEASE ORAL at 06:19

## 2018-09-14 RX ADMIN — CARVEDILOL 6.25 MG: 6.25 TABLET, FILM COATED ORAL at 15:47

## 2018-09-14 RX ADMIN — TRAZODONE HYDROCHLORIDE 50 MG: 50 TABLET ORAL at 21:27

## 2018-09-14 RX ADMIN — TRAMADOL HYDROCHLORIDE 50 MG: 50 TABLET, FILM COATED ORAL at 12:38

## 2018-09-14 RX ADMIN — TRAMADOL HYDROCHLORIDE 50 MG: 50 TABLET, FILM COATED ORAL at 08:52

## 2018-09-14 RX ADMIN — ENOXAPARIN SODIUM 100 MG: 100 INJECTION SUBCUTANEOUS at 15:52

## 2018-09-14 RX ADMIN — ISOSORBIDE MONONITRATE 30 MG: 30 TABLET, EXTENDED RELEASE ORAL at 08:20

## 2018-09-14 RX ADMIN — BUDESONIDE 500 MCG: 0.5 INHALANT RESPIRATORY (INHALATION) at 05:36

## 2018-09-14 NOTE — PROGRESS NOTES
End Of Shift Functional Summary, Nursing BLADDER:   
Pt does not have a medina catheter that staff manages. Pt does not take medication. If so, please indicate which medication:   
Pt is continent. of bladder and voids in toilet  Pt requires staff to empty device Pt has had 0 bladder accidents during this shift    (An accident is when the episode is not contained in a brief AND/OR the clothing/linen requires changing/cleaning up.) BED/CHAIR TRANSFER Pt requires moderate assistance. Pt uses walker Does the patient require extra time? yes Does the patient require contact guard assistance? yes Does the patient require more than one staff member for assistance? no 
 
 
 
Documentation reviewed and plan of care discussed/reviewed with  
patient and oncoming nurse during the shift.

## 2018-09-14 NOTE — PROGRESS NOTES
PHYSICAL THERAPY DAILY NOTE Time In: 1031 Time Out: 1115 Patient Seen For: AM;Balance activities;Gait training;Patient education; Therapeutic exercise;Transfer training; Other (see progress notes) Subjective: patient reporting her daughter brought in her left hand splint. Reports difficulty with sit to stand with hand splint on due to metal bar inside splint . Reports less SOB when walking longer distances Objective:Vital Signs: 
Patient Vitals for the past 12 hrs: 
 Temp Pulse Resp BP SpO2  
09/14/18 0808 98.2 °F (36.8 °C) 64 18 133/84 96 % 09/14/18 0536 - - - - 93 % Patient on 02 at 3 lpm. 02 sat 93 to 95% during treatment. HR 66 to 72 Pain level:No c/o pain during treatment Patient education:Balance training,transfer training, gait training, fall precautions, activity pacing, body mechanics, rollator parts management, energy conservation, breathing techniques during functional mobility. Patient verbalizing understanding and demonstrating partial understanding of patient education. Recommend follow up education. Interdisciplinary Communication:NA Other (comment) (Low Vision, Fall Risk) GROSS ASSESSMENT Daily Assessment NA  
 
 
BED/MAT MOBILITY Daily Assessment Rolling Right : 0 (Not tested) Rolling Left : 0 (Not tested) Supine to Sit : 0 (Not tested) Sit to Supine : 0 (Not tested) TRANSFERS Daily Assessment Increased time and effort to complete with cues for body mechanics and rollator parts management Transfer Type: SPT with walker (rollator) Transfer Assistance : 5 (Stand-by assistance) Sit to Stand Assistance: Stand-by assistance Car Transfers: Not tested GAIT Daily Assessment Amount of Assistance: 5 (Stand-by assistance) Distance (ft): 120 Feet (ft) Assistive Device: Walker, rollator Gait training with cues for posture correction and cues to control breathing rate with gait speed STEPS or STAIRS Daily Assessment Steps/Stairs Ambulated (#): 0 Level of Assist : 0 (Not tested) BALANCE Daily Assessment Sitting - Static: Good (unsupported) Sitting - Dynamic: Good (unsupported) Standing - Static: Fair Standing - Dynamic : Impaired WHEELCHAIR MOBILITY Daily Assessment Curbs/Ramps Assist Required (FIM Score): 0 (Not tested) Wheelchair Setup Assist Required : 0 (Not tested) LOWER EXTREMITY EXERCISES Daily Assessment Increased time and effort to complete with multiple and frequent rest breaks. Cues for breathing pattern and correct form Extremity: Both Exercise Type #1: Seated lower extremity strengthening Sets Performed: 2 Reps Performed: 10 Level of Assist: Minimal assistance SEATED EXERCISES Sets Reps Comments Ankle Pumps 1 20 Hip Flexion 2 10 [address] 2 10 Hip Adduction/Ball Squeeze 2 10 Hip Abduction with green Theraband 2 10 Hamstring Curls with green Theraband 2 10 Hip Extension with green Theraband 2 10 Assessment: Progressing towards modified independence with gait and transfers with rollator. Functional endurance improving daily Patient to recreational therapy at end of treatment Plan of Care: Continue with POC and progress as tolerated. Thee Perez, PT 
9/14/2018

## 2018-09-14 NOTE — PROGRESS NOTES
Problem: Falls - Risk of 
Goal: *Absence of Falls Document Jo Deal Fall Risk and appropriate interventions in the flowsheet. Outcome: Progressing Towards Goal 
Fall Risk Interventions: 
Mobility Interventions: Communicate number of staff needed for ambulation/transfer, OT consult for ADLs, Patient to call before getting OOB, PT Consult for mobility concerns, PT Consult for assist device competence, Strengthening exercises (ROM-active/passive), Utilize walker, cane, or other assistive device Medication Interventions: Assess postural VS orthostatic hypotension, Evaluate medications/consider consulting pharmacy, Patient to call before getting OOB Elimination Interventions: Call light in reach, Patient to call for help with toileting needs, Toilet paper/wipes in reach History of Falls Interventions: Consult care management for discharge planning, Evaluate medications/consider consulting pharmacy, Investigate reason for fall

## 2018-09-14 NOTE — PROGRESS NOTES
09/14/18 1050 Grooming Functional Level 5 Tasks Completed by Patient Washed face Comments Setup Bathing Functional Level 4 Body Parts Patient Bathed Abdomen;Arm, right;Buttocks; Chest;Lower leg and foot, left; Lower leg and foot, right;Aura area; Thigh, left; Thigh, right Comments Assist to bathe L arm, use of bathing mitt on L hand and education/use of long handled sponge to bathe feet seated on TTB Upper Body Dressing/Undressing Functional Level 4 Items Applied/Steps Completed Pullover (4 steps) Comments Assist to help over R shoulder posteriorly Lower Body Dressing/Undressing Functional Level 4 Items Applied/Steps Completed Shoe, left (1 step); Shoe, right (1 step); Underpants (3 steps); Elastic waist pants (3 steps) Comments Assist to finish slippers around feet and to finish pants over hips posteriorly in stance Bed/Chair/Wheelchair Transfers Supine to Sit  SBA Sit to Stand Assistance Supervision Tub/Shower Transfer Avery Tub or Shower Type Shower Tub/Shower Transfer Score 4 Comments HHA  
 
 
S: \"I don't know why I feel like this today. \" [increased pain and lethargic, RN reports sleep medication likely still in effect] Agreeable to therapy. Focus of session was on ADL and functional mobility. Patient was able to ambulate ~10 feet x 3 with HHA. Pain rated 10/10 in LLE, RN provided medication. Collaborated with PT, Alton Voss and confirmed patient is on track to reach goals as documented in the care plan. Patient tolerated session well, but activity tolerance, balance, functional mobility, strength, coordination are still below baseline and require skilled facilitation to successfully and safely complete ADL's and transfers. Patient ended session in recliner with call remote and phone within reach.   
 
Kristen Cerda, OTR/L

## 2018-09-14 NOTE — PROGRESS NOTES
PHYSICAL THERAPY DAILY NOTE Time In: 5953 Time Out: 1512 Patient Seen For: PM;Balance activities;Gait training;Patient education; Therapeutic exercise;Transfer training; Other (see progress notes) Subjective: patient reporting she is tired from AM therapy. Reports she feels like she can walk further with the rollator. Objective:Vital Signs:patient on 02 at 3 lpm, resting 02 sat 93 to 95%. 02 sat 85% after ambulating 180 ft. 02 sat 91% after 2 mins of rest with pursed lip breathing Patient Vitals for the past 12 hrs: 
 Temp Pulse Resp BP SpO2  
09/14/18 1511 95 °F (35 °C) 74 16 117/69 95 % 09/14/18 1403 - - - - 95 % 09/14/18 0808 98.2 °F (36.8 °C) 64 18 133/84 96 % 09/14/18 0536 - - - - 93 % Pain level:No c/o pain during treatment Pain location:NA Pain interventions:NA Patient education:Balance training,transfer training, gait training, fall precautions, activity pacing, body mechanics, rollator parts management,energy conservation, breathing techniques during functional mobility Patient verbalizing understanding and demonstrating partial understanding of patient education. Recommend follow up education. Interdisciplinary Communication:NA Other (comment) (Low Vision, Fall Risk) GROSS ASSESSMENT Daily Assessment NA  
 
 
BED/MAT MOBILITY Daily Assessment Rolling Right : 0 (Not tested) Rolling Left : 0 (Not tested) Supine to Sit : 0 (Not tested) Sit to Supine : 0 (Not tested) TRANSFERS Daily Assessment Increased time and effort to complete with cues for body mechanics and rollator parts management Transfer Type: SPT without device Transfer Assistance : 5 (Stand-by assistance) Sit to Stand Assistance: Stand-by assistance Car Transfers: Not tested GAIT Daily Assessment Amount of Assistance: 5 (Stand-by assistance) Distance (ft): 180 Feet (ft) (180 ft x 1  120 ft x 1) Assistive Device: Walker, rollator Gait training with cues for posture correction and cues to control gait speed with respiratory rate STEPS or STAIRS Daily Assessment Steps/Stairs Ambulated (#): 0 Level of Assist : 0 (Not tested) BALANCE Daily Assessment Standing balance activities before and after toileting with supervision. Patient modified independent with clothing management and hygiene Sitting - Static: Good (unsupported) Sitting - Dynamic: Good (unsupported) Standing - Static: Fair Standing - Dynamic : Impaired WHEELCHAIR MOBILITY Daily Assessment Curbs/Ramps Assist Required (FIM Score): 0 (Not tested) Wheelchair Setup Assist Required : 0 (Not tested) LOWER EXTREMITY EXERCISES Daily Assessment Extremity: Both Exercise Type #1: Other (comment) (LE motomed x 10 mins at level 2) Level of Assist: Supervision Assessment: Progressing towards modified independence with gait and transfers with rollator. Functional endurance improving daily. 02 sat declined this PM with increase in gait distance but able improve 02 sat > 90% with breathing techniques in 2 minutes Patient returned to room at end of treatment and remained up in recliner with LEs elevated and with needs in reach. 02 at 3 lpm 
 
Plan of Care: Continue with POC and progress as tolerated. Abilio Mancuso, PT 
9/14/2018

## 2018-09-14 NOTE — WOUND CARE
Left knee has been followed by GS during hospital course. The traumatic hematoma is evolving as expected, the skin is now beginning to peel, surrounding skin is warm to touch, deep purple in center extending outward in a blister. Recommend non stick bandage for now. Patient informed that the entire blister will likely peel off soon leaving a shallow pink open area which would also need a non stick bandage, possibly some ointment if dry. Will loosely monitor during acute rehab. May need home health to continue bandaging at home.

## 2018-09-14 NOTE — PROGRESS NOTES
Problem: Falls - Risk of 
Goal: *Absence of Falls Document Sada Yeboah Fall Risk and appropriate interventions in the flowsheet. Outcome: Progressing Towards Goal 
Fall Risk Interventions: 
Mobility Interventions: Communicate number of staff needed for ambulation/transfer Medication Interventions: Assess postural VS orthostatic hypotension Elimination Interventions: Call light in reach History of Falls Interventions: Consult care management for discharge planning

## 2018-09-14 NOTE — PROGRESS NOTES
End Of Shift Functional Summary, Nursing Hourly rounding completed throughout shift 
  
  
  
BLADDER:   
Pt does not have a medina catheter that staff manages. Pt does not take medication. If so, please indicate which medication:   
Pt is continent. of bladder and voids in toilet  Pt requires staff to empty device Pt has had 0 bladder accidents during this shift    (An accident is when the episode is not contained in a brief AND/OR the clothing/linen requires changing/cleaning up.)   
  
BED/CHAIR TRANSFER Pt requires moderate assistance. Pt uses walker Does the patient require extra time? yes Does the patient require contact guard assistance? yes Does the patient require more than one staff member for assistance?  no 
  
  
  
Documentation reviewed and plan of care discussed/reviewed with  
patient and oncoming nurse during the shift.

## 2018-09-14 NOTE — PROGRESS NOTES
Subjective \"I am ready to work with you. \"  
Activity Bean bag toss with the use of her left UE. Patient was unable to throw with her RUE due to her soreness from a previous fall. Strength/Endurance Patient commented that she felt very fatigued and eager for a nap. Balance Sit<->stand: CGA with RW  
Social Interaction Patient was friendly and in good spirits during the session. She joked around to the staff on the unit. Cognitive A&O X4 Comments Patient was assisted to her room with the assistance of Mindy [place] tech at the end of the session. She was at 94% on 3 liters O2.   
 
James Jacobsen, CTRS

## 2018-09-14 NOTE — PROGRESS NOTES
Warfarin dosing per pharmacist 
 
Belén Rodriguez Anish White is a 77 y.o. female. Indication:  S/p AVR Goal INR:  2-3 Home dose:  5 mg Sun and 2.5 mg all other days  (Weekly dose of 20 mg) Risk factors or significant drug interactions:  none Other anticoagulants:  Lovenox bridge Daily Monitoring Date  INR     Warfarin dose HGB              Notes 9/8  3.4  Hold  9.3 
9/9  2.8  Hold  9.0  
9/10  2.0  Hold  9.6 9/11  1.5  5 mg   9.5 9/12  1.3  5 mg  10.5 9/13  1.3  5 mg  -- 
9/14  1. .3  7.5 mg  -- Pharmacy has been consulted to dose warfarin for this patient due to a history of AVR (mechanical). The INR goal per the patient's home warfarin clinic is 2-3. Patient initially presented with a supratherapeutic INR that has trended down. INR 1.3 again after 3 doses. Increase 7.5 mg this evening. Following daily INR. Thank you, Yeni Austin, Pharm. D. Clinical Pharmacist 
[phone]

## 2018-09-14 NOTE — PROGRESS NOTES
Lizy Theodore MD, Medical Director Marcel Schwartz [address], [address] [facility] Tel: [phone] SFD PROGRESS NOTE [address] Admit Date: 9/11/2018 Admit Diagnosis: debility; Physical debility Subjective Doing well. Finally slept well; did take 25mg Trazadone. No cp,sob, n. + BM. sats 96% on 3L Objective:  
 
Current Facility-Administered Medications Medication Dose Route Frequency  traZODone (DESYREL) tablet 25-50 mg  25-50 mg Oral QHS  furosemide (LASIX) tablet 40 mg  40 mg Oral BID  
 0.9% sodium chloride infusion 250 mL  250 mL IntraVENous PRN  
 acetaminophen (TYLENOL) tablet 650 mg  650 mg Oral Q6H PRN  
 sodium chloride (NS) flush 5-10 mL  5-10 mL IntraVENous Q8H  
 sodium chloride (NS) flush 5-10 mL  5-10 mL IntraVENous PRN  
 albuterol (PROVENTIL VENTOLIN) nebulizer solution 2.5 mg  2.5 mg Nebulization Q4H PRN  
 alum-mag hydroxide-simeth (MYLANTA) oral suspension 30 mL  30 mL Oral Q4H PRN  
 ascorbic acid (vitamin C) (VITAMIN C) tablet 500 mg  500 mg Oral DAILY  aspirin delayed-release tablet 81 mg  81 mg Oral DAILY  bisacodyl (DULCOLAX) suppository 10 mg  10 mg Rectal DAILY PRN  
 budesonide (PULMICORT) 500 mcg/2 ml nebulizer suspension  500 mcg Nebulization BID RT And  
 albuterol (PROVENTIL VENTOLIN) nebulizer solution 2.5 mg  2.5 mg Nebulization Q6HWA RT  
 carvedilol (COREG) tablet 6.25 mg  6.25 mg Oral BID WITH MEALS  enoxaparin (LOVENOX) injection 100 mg  100 mg SubCUTAneous Q24H  
 escitalopram oxalate (LEXAPRO) tablet 20 mg  20 mg Oral DAILY  influenza vaccine 2018-19 (6 mos+)(PF) (FLUARIX QUAD/FLULAVAL QUAD) injection 0.5 mL  0.5 mL IntraMUSCular PRIOR TO DISCHARGE  insulin lispro (HUMALOG) injection   SubCUTAneous AC&HS  isosorbide mononitrate ER (IMDUR) tablet 30 mg  30 mg Oral DAILY  morphine 10 mg/mL injection 2 mg  2 mg IntraVENous Q4H PRN  
  ondansetron (ZOFRAN ODT) tablet 4 mg  4 mg Oral Q6H PRN  
 oxyCODONE IR (ROXICODONE) tablet 5-10 mg  5-10 mg Oral Q4H PRN  pantoprazole (PROTONIX) tablet 40 mg  40 mg Oral ACB  polyethylene glycol (MIRALAX) packet 17 g  17 g Oral DAILY PRN  
 simvastatin (ZOCOR) tablet 20 mg  20 mg Oral QHS  tiotropium (SPIRIVA) inhalation capsule 18 mcg  18 mcg Inhalation DAILY  traMADol (ULTRAM) tablet 50 mg  50 mg Oral Q4H PRN  
 warfarin (COUMADIN) tablet 5 mg  5 mg Oral QPM  
 
Review of Systems:Denies chest pain, shortness of breath, cough, headache, visual problems, abdominal pain, dysurea, calf pain. Pertinent positives are as noted in the medical records and unremarkable otherwise. Visit Vitals  /84  Pulse 64  Temp 98.2 °F (36.8 °C)  Resp 18  SpO2 96% Physical Exam:  
General: Alert and age appropriately oriented. No acute cardio respiratory distress. HEENT: Normocephalic,no scleral icterus Oral mucosa moist without cyanosis Lungs: Clear to auscultation  Bilaterally. Decreased at bases Respiration even and unlabored Heart: Regular rate and rhythm, S1, S2 No  murmurs, clicks, rub or gallops Abdomen: Soft, non-tender, nondistended. Bowel sounds present. No organomegaly. obese Genitourinary: defer Neuromuscular:  
 
 Generalized prox> distal weakness Limited by body habitus Non focal.   
Skin/extremity: No rashes, no erythema. No calf tenderness BLE 
L knee wound; peeling traumatic hematoma with purple center, no surrounding erythema, no joint restrictions. No effusion 1 edema pedal   
                                                                        
Functional Assessment: 
   
   
Balance Sitting - Static: Good (unsupported) (09/13/18 1600) Sitting - Dynamic: Fair (occasional) (09/13/18 1600) Standing - Static: Fair (09/13/18 1600) Standing - Dynamic : Impaired (09/13/18 1600) Maryellen Treviño Fall Risk Assessment: 
Alexus Ayala Mobility: Ambulates or transfers with assist devices or assistance (09/13/18 2321) Mobility Interventions: Communicate number of staff needed for ambulation/transfer;OT consult for ADLs; Patient to call before getting OOB;PT Consult for mobility concerns;PT Consult for assist device competence;Strengthening exercises (ROM-active/passive); Utilize walker, cane, or other assistive device (09/13/18 2321) Mentation: Alert, oriented x 3 (09/13/18 2321) Medication: Patient receiving anticonvulsants, sedatives(tranquilizers), psychotropics or hypnotics, hypoglycemics, narcotics, sleep aids, antihypertensives, laxatives, or diuretics (09/13/18 2321) Medication Interventions: Assess postural VS orthostatic hypotension; Evaluate medications/consider consulting pharmacy; Patient to call before getting OOB (09/13/18 2321) Elimination: Needs assistance with toileting (09/13/18 2321) Elimination Interventions: Call light in reach; Patient to call for help with toileting needs; Toilet paper/wipes in reach (09/13/18 2321) Prior Fall History: Before admission in past 12 months _home or previous inpatient care) (09/13/18 2321) History of Falls Interventions: Consult care management for discharge planning;Evaluate medications/consider consulting pharmacy; Investigate reason for fall (09/13/18 2321) Total Score: 4 (09/13/18 2321) Standard Fall Precautions: Yes (09/13/18 2321) High Fall Risk: Yes (09/13/18 2321) Speech Assessment: 
    
 
Ambulation: 
Gait Distance (ft): 80 Feet (ft) (80ft x 1  60ft x 1  30ft x 2) (09/13/18 1600) Assistive Device: Gait belt;Walker, rollator (09/13/18 1600) Rail Use: Right  (09/12/18 1600) Labs/Studies: 
Recent Results (from the past 72 hour(s)) PLEASE READ & DOCUMENT PPD TEST IN 48 HRS Collection Time: 09/11/18  9:27 AM  
Result Value Ref Range PPD  neggative Negative  
 mm Induration  0 mm GLUCOSE, POC Collection Time: 09/11/18 11:02 AM  
Result Value Ref Range Glucose (POC) 155 (H) 65 - 100 mg/dL GLUCOSE, POC Collection Time: 09/11/18  4:27 PM  
Result Value Ref Range Glucose (POC) 106 (H) 65 - 100 mg/dL GLUCOSE, POC Collection Time: 09/11/18  8:35 PM  
Result Value Ref Range Glucose (POC) 186 (H) 65 - 100 mg/dL CBC WITH AUTOMATED DIFF Collection Time: 09/12/18  6:11 AM  
Result Value Ref Range WBC 7.8 4.3 - 11.1 K/uL  
 RBC 3.58 (L) 4.05 - 5.2 M/uL  
 HGB 10.5 (L) 11.7 - 15.4 g/dL HCT 33.8 (L) 35.8 - 46.3 % MCV 94.4 79.6 - 97.8 FL  
 MCH 29.3 26.1 - 32.9 PG  
 MCHC 31.1 (L) 31.4 - 35.0 g/dL  
 RDW 15.9 % PLATELET 492 249 - 589 K/uL MPV 10.3 9.4 - 12.3 FL ABSOLUTE NRBC 0.00 0.0 - 0.2 K/uL  
 DF AUTOMATED NEUTROPHILS 64 43 - 78 % LYMPHOCYTES 24 13 - 44 % MONOCYTES 9 4.0 - 12.0 % EOSINOPHILS 2 0.5 - 7.8 % BASOPHILS 1 0.0 - 2.0 % IMMATURE GRANULOCYTES 0 0.0 - 5.0 %  
 ABS. NEUTROPHILS 5.0 1.7 - 8.2 K/UL  
 ABS. LYMPHOCYTES 1.9 0.5 - 4.6 K/UL  
 ABS. MONOCYTES 0.7 0.1 - 1.3 K/UL  
 ABS. EOSINOPHILS 0.2 0.0 - 0.8 K/UL  
 ABS. BASOPHILS 0.0 0.0 - 0.2 K/UL  
 ABS. IMM. GRANS. 0.0 0.0 - 0.5 K/UL METABOLIC PANEL, BASIC Collection Time: 09/12/18  6:11 AM  
Result Value Ref Range Sodium 141 136 - 145 mmol/L Potassium 4.2 3.5 - 5.1 mmol/L Chloride 92 (L) 98 - 107 mmol/L  
 CO2 39 (H) 21 - 32 mmol/L Anion gap 10 7 - 16 mmol/L Glucose 97 65 - 100 mg/dL BUN 66 (H) 8 - 23 MG/DL Creatinine 2.35 (H) 0.6 - 1.0 MG/DL  
 GFR est AA 26 (L) >60 ml/min/1.73m2 GFR est non-AA 22 (L) >60 ml/min/1.73m2 Calcium 10.4 8.3 - 10.4 MG/DL MAGNESIUM Collection Time: 09/12/18  6:11 AM  
Result Value Ref Range Magnesium 2.2 1.8 - 2.4 mg/dL PROTHROMBIN TIME + INR Collection Time: 09/12/18  6:11 AM  
Result Value Ref Range Prothrombin time 15.8 (H) 11.5 - 14.5 sec INR 1.3 GLUCOSE, POC Collection Time: 09/12/18  7:36 AM  
Result Value Ref Range Glucose (POC) 119 (H) 65 - 100 mg/dL GLUCOSE, POC Collection Time: 09/12/18 11:36 AM  
Result Value Ref Range Glucose (POC) 121 (H) 65 - 100 mg/dL GLUCOSE, POC Collection Time: 09/12/18  4:31 PM  
Result Value Ref Range Glucose (POC) 137 (H) 65 - 100 mg/dL GLUCOSE, POC Collection Time: 09/12/18  8:38 PM  
Result Value Ref Range Glucose (POC) 219 (H) 65 - 100 mg/dL PROTHROMBIN TIME + INR Collection Time: 09/13/18  5:48 AM  
Result Value Ref Range Prothrombin time 15.8 (H) 11.5 - 14.5 sec INR 1.3 GLUCOSE, POC Collection Time: 09/13/18  7:24 AM  
Result Value Ref Range Glucose (POC) 98 65 - 100 mg/dL GLUCOSE, POC Collection Time: 09/13/18 11:13 AM  
Result Value Ref Range Glucose (POC) 141 (H) 65 - 100 mg/dL METABOLIC PANEL, BASIC Collection Time: 09/13/18 11:14 AM  
Result Value Ref Range Sodium 136 136 - 145 mmol/L Potassium 4.7 3.5 - 5.1 mmol/L Chloride 92 (L) 98 - 107 mmol/L  
 CO2 33 (H) 21 - 32 mmol/L Anion gap 11 7 - 16 mmol/L Glucose 132 (H) 65 - 100 mg/dL BUN 77 (H) 8 - 23 MG/DL Creatinine 2.48 (H) 0.6 - 1.0 MG/DL  
 GFR est AA 25 (L) >60 ml/min/1.73m2 GFR est non-AA 20 (L) >60 ml/min/1.73m2 Calcium 9.3 8.3 - 10.4 MG/DL  
GLUCOSE, POC Collection Time: 09/13/18  4:16 PM  
Result Value Ref Range Glucose (POC) 135 (H) 65 - 100 mg/dL GLUCOSE, POC Collection Time: 09/13/18  7:15 PM  
Result Value Ref Range Glucose (POC) 208 (H) 65 - 100 mg/dL PROTHROMBIN TIME + INR Collection Time: 09/14/18  6:04 AM  
Result Value Ref Range Prothrombin time 15.5 (H) 11.5 - 14.5 sec INR 1.3 METABOLIC PANEL, BASIC Collection Time: 09/14/18  6:04 AM  
Result Value Ref Range Sodium 140 136 - 145 mmol/L Potassium 3.8 3.5 - 5.1 mmol/L Chloride 95 (L) 98 - 107 mmol/L  
 CO2 35 (H) 21 - 32 mmol/L Anion gap 10 7 - 16 mmol/L Glucose 85 65 - 100 mg/dL BUN 74 (H) 8 - 23 MG/DL  Creatinine 2.35 (H) 0.6 - 1.0 MG/DL  
 GFR est AA 26 (L) >60 ml/min/1.73m2 GFR est non-AA 22 (L) >60 ml/min/1.73m2 Calcium 9.2 8.3 - 10.4 MG/DL  
GLUCOSE, POC Collection Time: 09/14/18  7:27 AM  
Result Value Ref Range Glucose (POC) 105 (H) 65 - 100 mg/dL Assessment:  
 
Problem List as of 9/14/2018  Date Reviewed: 8/31/2018 Codes Class Noted - Resolved Coagulopathy ([facility] INR >4 on admission 9/7/18 ICD-10-CM: D68.9 ICD-9-CM: 286.9  9/8/2018 - Present Traumatic hematoma of left knee ICD-10-CM: Y73.10WX ICD-9-CM: 924.11  9/8/2018 - Present Debility ICD-10-CM: R53.81 ICD-9-CM: 799.3  9/8/2018 - Present Anemia ICD-10-CM: D64.9 ICD-9-CM: 285.9  9/7/2018 - Present Chronic respiratory failure with hypoxia (HCC) (Chronic) ICD-10-CM: J96.11 
ICD-9-CM: 518.83, 799.02  9/7/2018 - Present Acute kidney injury superimposed on chronic kidney disease ([facility] ICD-10-CM: N17.9, N18.9 ICD-9-CM: 866.00, 585.9  9/7/2018 - Present Encounter for immunization ICD-10-CM: H93 ICD-9-CM: V03.89  8/21/2018 - Present Obesity, morbid ([facility].) ICD-10-CM: E66.01 
ICD-9-CM: 278.01  6/8/2018 - Present * (Principal)Physical debility ICD-10-CM: R53.81 ICD-9-CM: 799.3  5/2/2018 - Present Closed nondisplaced fracture of shaft of fifth metacarpal bone of left hand ICD-10-CM: L83.219S ICD-9-CM: 815.03  4/28/2018 - Present Subdural hematoma (HCC) ICD-10-CM: I62.00 ICD-9-CM: 432.1  4/27/2018 - Present Long term (current) use of anticoagulants (Chronic) ICD-10-CM: Z79.01 
ICD-9-CM: V58.61  4/19/2018 - Present Dysthymia ICD-10-CM: F34.1 ICD-9-CM: 300.4  4/5/2018 - Present Type 2 diabetes mellitus with nephropathy (HCC) (Chronic) ICD-10-CM: E11.21 
ICD-9-CM: 250.40, 583.81  12/18/2017 - Present Pulmonary hypertension (HCC) (Chronic) ICD-10-CM: I27.20 ICD-9-CM: 416.8  6/15/2016 - Present S/P AVR (aortic valve replacement) (Chronic) ICD-10-CM: Z95.2 ICD-9-CM: V43.3  Unknown - Present Overview Signed 2/23/2016 11:04 AM by Star Ravi Mechanical/Artific Cardiomyopathy ([facility] (Chronic) ICD-10-CM: I42.9 ICD-9-CM: 425.4  Unknown - Present Osteopenia ICD-10-CM: M85.80 ICD-9-CM: 733.90  Unknown - Present HLD (hyperlipidemia) (Chronic) ICD-10-CM: R46.6 ICD-9-CM: 272.4  Unknown - Present Osteoarthritis ICD-10-CM: M19.90 ICD-9-CM: 715.90  Unknown - Present  
   
 ICD (implantable cardioverter-defibrillator) in place ICD-10-CM: Z95.810 ICD-9-CM: V45.02  10/2/2014 - Present Overview Signed 10/2/2014  4:58 PM by [facility] [employer] single-chamber ICD implantation 10/20/14 Diabetes mellitus type 2, diet-controlled (HCC) (Chronic) ICD-10-CM: E11.9 ICD-9-CM: 250.00  8/28/2014 - Present COPD (chronic obstructive pulmonary disease) (HCC) (Chronic) ICD-10-CM: J44.9 ICD-9-CM: 507  4/2/2014 - Present REJI (obstructive sleep apnea)-cpap (Chronic) ICD-10-CM: G47.33 
ICD-9-CM: 327.23  4/2/2014 - Present CKD (chronic kidney disease) stage 3, GFR 30-59 ml/min (Chronic) ICD-10-CM: N18.3 ICD-9-CM: 585.3  7/10/2013 - Present Anticoagulated on Coumadin (Chronic) ICD-10-CM: Z51.81, Z79.01 
ICD-9-CM: V58.83, V58.61  7/9/2013 - Present Overview Signed 7/9/2013  4:16 PM by Rod Alarcon NP  
  S/P AVR 
  
  
   
 CAD (coronary artery disease) (Chronic) ICD-10-CM: I25.10 ICD-9-CM: 414.00  1/20/2013 - Present Overview Signed 5/20/2014 10:05 AM by Miki Smith NP  
  5/8/14 PCI LAD with stent placed Chronic combined systolic and diastolic heart failure (HCC) (Chronic) ICD-10-CM: I50.42 
ICD-9-CM: 428.42  1/20/2013 - Present Overview Addendum 4/28/2018  4:53 AM by Jemma Ordonez MD  
  5/8/14 ECHO:  EF 10-15% 12/2017:  EF 25-30% Essential hypertension, benign (Chronic) ICD-10-CM: I10 
ICD-9-CM: 401.1  1/20/2013 - Present MDS (myelodysplastic syndrome) (HCC) (Chronic) ICD-10-CM: D46.9 ICD-9-CM: 238.75  12/17/2011 - Present Overview Addendum 8/29/2013 11:06 AM by Diogo Fong Procrit started in August, 2011 12/18/11 Procrit weekly and Iron stores. 5-12 12-13-12 good response to 3 weekly procrit did not need it last time 3-7-13 Pt doing well. Just wanted a \"check-up. \" Responding to Procrit every three weeks. 8-29-13 patient has missed some injections on recently restarted hemoglobin only issue , takes oral iron iron stores each time Iron deficiency anemia due to chronic blood loss (Chronic) ICD-10-CM: D50.0 ICD-9-CM: 280.0  7/29/2009 - Present RESOLVED: Routine general medical examination at a health care facility ICD-10-CM: Z00.00 ICD-9-CM: V70.0  12/16/2016 - 4/27/2018 Assessment: Profound Physical Debility s/p admitted with moderately severe anemia 
   
Continue daily physician medical management: 
Pneumonia prophylaxis- Incentive spirometer every hour while awake 
   
Chronic hypoxic respir failure; cont RT txs. On Trilogy at [facility]. Monitor O2 sats and cont O2 supplementation. Currently on 3L per NC 
  
Insomnia; add Trazadone, or dtg can bring in her Melatonin 
   
DVT risk / DVT Prophylaxis- Will require daily physician exam to assess for signs and symptoms as patient is at increased risk for of thromboembolism. Mobilization as tolerated. Intermittent pneumatic compression devices when in bed Thigh-high or knee-high thromboembolic deterrent hose when out of bed.  No further anticoag is needed; on therapeutic Lovenox while bridging to coumadin 
-9/12 inr down 1.3 ;pharmacy dosing; 9/13 not changed ; now on 5mg coumadin; cont lovenox until therapeutic 
-9/14 INR still subtherap at 1.3 , thus continue sq Lovenox; pharmacy dosing 
   
S/p mech AVR; chronic coumadin therapy but such hi risk of bleeding due to multiple falls.   
   
 CHF; cont aldactone, coreg, imdur and lasix; 9/13 pt was on Lasix 80mg bid during acute stay, have dec to 40 mg bid; states she takes 20mg at home; monitor closely for signs of chf exacerb.  
-9/14 compensated; monitor as Lasix has decreased to 40 bid, home dose 20 daily; FELIZ on CKD thus decreasing Lasix; change to 40 daily  
   
Pain Control: ongoing significant pain in knees which is stable and controlled by PRN meds. Will require regular pain assessment and comprenhensive pain management,  
   
Wound Care: Monitor wound status daily per staff and physician. At risk for failure. Will require 24/7 rehab nursing. Keep wound clean and dry, Reinforce dressing PRN and Ice to area for comfort 
   
Hypertension - BP controlled, fluctuating, managed medically.  
   
Acute on chronic anemia; possible MDS followed by Dr Ellen Staley. Monitor closely, sp transfusion. Cont epocrit. Cont ferrous sulfate and vit C 
   
Depression; cont Lexapro. Depression regarding medical cond and fear of losing independence 
   
Diabetes mellitus - controlled. poor glycemic control. Will require daily, close FSG monitoring and medication adjustment to optimize glycemic control in setting of acute illness and hospitalization.  
-diet controlled so far 
   
Urinary retention/ neurogenic bladder - schedule voids q6-8 hrs. Check post-void residual as needed; In and Out catheterize if post-void residual is more than 400 cc. 
-having urinary incont; will try timed voids due to urgency 
   
CKD stg 3; creat 2.05 on admission to rehab. Was 1.55 yesterday. Recheck in a.m and review meds contributing. 
-9/12 creat is increasing; dec lasix; recheck pending 9/13. 2.48 Today 9/14 creat 2.35, BUN 74; slowly improving with drop in Lasix/CHF well compensated; DAILY BMP 
   
bowel program - add bowel program prn 
   
GERD - add PPI. At times may need additional antacids, Maalox prn.  Previous hx of GIB 
   
 
 
Time spent was 25 minutes with over 1/2 in direct patient care/examination, consultation and coordination of care. Signed By: Pearl Jay MD   
 September 14, 2018

## 2018-09-15 LAB
ANION GAP SERPL CALC-SCNC: 8 MMOL/L (ref 7–16)
BUN SERPL-MCNC: 77 MG/DL (ref 8–23)
CALCIUM SERPL-MCNC: 9.5 MG/DL (ref 8.3–10.4)
CHLORIDE SERPL-SCNC: 100 MMOL/L (ref 98–107)
CO2 SERPL-SCNC: 34 MMOL/L (ref 21–32)
CREAT SERPL-MCNC: 2.1 MG/DL (ref 0.6–1)
GLUCOSE BLD STRIP.AUTO-MCNC: 158 MG/DL (ref 65–100)
GLUCOSE BLD STRIP.AUTO-MCNC: 162 MG/DL (ref 65–100)
GLUCOSE BLD STRIP.AUTO-MCNC: 182 MG/DL (ref 65–100)
GLUCOSE BLD STRIP.AUTO-MCNC: 99 MG/DL (ref 65–100)
GLUCOSE SERPL-MCNC: 88 MG/DL (ref 65–100)
INR PPP: 1.4
POTASSIUM SERPL-SCNC: 4.1 MMOL/L (ref 3.5–5.1)
PROTHROMBIN TIME: 16.3 SEC (ref 11.5–14.5)
SODIUM SERPL-SCNC: 142 MMOL/L (ref 136–145)

## 2018-09-15 PROCEDURE — 65310000000 HC RM PRIVATE REHAB

## 2018-09-15 PROCEDURE — 99232 SBSQ HOSP IP/OBS MODERATE 35: CPT | Performed by: PHYSICAL MEDICINE & REHABILITATION

## 2018-09-15 PROCEDURE — 97535 SELF CARE MNGMENT TRAINING: CPT

## 2018-09-15 PROCEDURE — 36415 COLL VENOUS BLD VENIPUNCTURE: CPT

## 2018-09-15 PROCEDURE — 82962 GLUCOSE BLOOD TEST: CPT

## 2018-09-15 PROCEDURE — 97150 GROUP THERAPEUTIC PROCEDURES: CPT

## 2018-09-15 PROCEDURE — 94760 N-INVAS EAR/PLS OXIMETRY 1: CPT

## 2018-09-15 PROCEDURE — 74011250636 HC RX REV CODE- 250/636: Performed by: PHYSICAL MEDICINE & REHABILITATION

## 2018-09-15 PROCEDURE — 94640 AIRWAY INHALATION TREATMENT: CPT

## 2018-09-15 PROCEDURE — 74011000250 HC RX REV CODE- 250: Performed by: PHYSICAL MEDICINE & REHABILITATION

## 2018-09-15 PROCEDURE — 85610 PROTHROMBIN TIME: CPT

## 2018-09-15 PROCEDURE — 74011250637 HC RX REV CODE- 250/637: Performed by: PHYSICAL MEDICINE & REHABILITATION

## 2018-09-15 PROCEDURE — 74011636637 HC RX REV CODE- 636/637: Performed by: PHYSICAL MEDICINE & REHABILITATION

## 2018-09-15 PROCEDURE — 80048 BASIC METABOLIC PNL TOTAL CA: CPT

## 2018-09-15 PROCEDURE — 77010033678 HC OXYGEN DAILY

## 2018-09-15 RX ADMIN — Medication 10 ML: at 06:20

## 2018-09-15 RX ADMIN — PANTOPRAZOLE SODIUM 40 MG: 40 TABLET, DELAYED RELEASE ORAL at 06:19

## 2018-09-15 RX ADMIN — BUDESONIDE 500 MCG: 0.5 INHALANT RESPIRATORY (INHALATION) at 18:00

## 2018-09-15 RX ADMIN — ENOXAPARIN SODIUM 100 MG: 100 INJECTION SUBCUTANEOUS at 17:44

## 2018-09-15 RX ADMIN — ASPIRIN 81 MG: 81 TABLET, COATED ORAL at 09:15

## 2018-09-15 RX ADMIN — ALBUTEROL SULFATE 2.5 MG: 2.5 SOLUTION RESPIRATORY (INHALATION) at 18:06

## 2018-09-15 RX ADMIN — TRAMADOL HYDROCHLORIDE 50 MG: 50 TABLET, FILM COATED ORAL at 06:35

## 2018-09-15 RX ADMIN — Medication 5 ML: at 17:35

## 2018-09-15 RX ADMIN — INSULIN LISPRO 2 UNITS: 100 INJECTION, SOLUTION INTRAVENOUS; SUBCUTANEOUS at 21:31

## 2018-09-15 RX ADMIN — SIMVASTATIN 20 MG: 20 TABLET, FILM COATED ORAL at 21:31

## 2018-09-15 RX ADMIN — CARVEDILOL 6.25 MG: 6.25 TABLET, FILM COATED ORAL at 17:41

## 2018-09-15 RX ADMIN — TRAMADOL HYDROCHLORIDE 50 MG: 50 TABLET, FILM COATED ORAL at 21:31

## 2018-09-15 RX ADMIN — ESCITALOPRAM OXALATE 20 MG: 10 TABLET ORAL at 09:15

## 2018-09-15 RX ADMIN — TRAZODONE HYDROCHLORIDE 50 MG: 50 TABLET ORAL at 21:31

## 2018-09-15 RX ADMIN — TRAMADOL HYDROCHLORIDE 50 MG: 50 TABLET, FILM COATED ORAL at 12:39

## 2018-09-15 RX ADMIN — BUDESONIDE 500 MCG: 0.5 INHALANT RESPIRATORY (INHALATION) at 06:00

## 2018-09-15 RX ADMIN — ISOSORBIDE MONONITRATE 30 MG: 30 TABLET, EXTENDED RELEASE ORAL at 09:15

## 2018-09-15 RX ADMIN — INSULIN LISPRO 2 UNITS: 100 INJECTION, SOLUTION INTRAVENOUS; SUBCUTANEOUS at 17:24

## 2018-09-15 RX ADMIN — OXYCODONE HYDROCHLORIDE AND ACETAMINOPHEN 500 MG: 500 TABLET ORAL at 09:15

## 2018-09-15 RX ADMIN — ALBUTEROL SULFATE 2.5 MG: 2.5 SOLUTION RESPIRATORY (INHALATION) at 10:39

## 2018-09-15 RX ADMIN — INSULIN LISPRO 2 UNITS: 100 INJECTION, SOLUTION INTRAVENOUS; SUBCUTANEOUS at 12:30

## 2018-09-15 RX ADMIN — CARVEDILOL 6.25 MG: 6.25 TABLET, FILM COATED ORAL at 09:15

## 2018-09-15 RX ADMIN — ALBUTEROL SULFATE 2.5 MG: 2.5 SOLUTION RESPIRATORY (INHALATION) at 06:00

## 2018-09-15 RX ADMIN — TIOTROPIUM BROMIDE 18 MCG: 18 CAPSULE ORAL; RESPIRATORY (INHALATION) at 06:17

## 2018-09-15 RX ADMIN — FUROSEMIDE 40 MG: 40 TABLET ORAL at 09:15

## 2018-09-15 NOTE — PROGRESS NOTES
End Of Shift Functional Summary, Nursing 
  
  
TOILETING:   
Does patient need assist with adjusting pants up or down and/or pericare? yes If yes, please indicate what the patient needs help with:  Pants and madi care  (i.e. pants up, pants down, pericare) Pt uses walker. Does the patient require extra time? yes Does the patient require standby assistance? no 
Does the patient require contact guard assistance? yes Does the patient require more than one staff member for assistance? no 
  
TOILET TRANSFER:   
Pt requires minimal assistance. Pt uses walker. Does the patient require extra time? yes Does the patient require contact guard assistance? yes Does the patient require more than one staff member for assistance? no 
  
BLADDER:   
Pt does not have a medina catheter that staff manages. Pt does not take medication. If so, please indicate which medication:   
Pt is continent. of bladder and voids in bedside commode  Pt requires staff to empty device Pt has had 0 bladder accidents during this shift .  (An accident is when the episode is not contained in a brief AND/OR the clothing/linen requires changing/cleaning up.)   
  
  
Documentation reviewed and plan of care discussed/reviewed with  
patient and oncoming nurse during the shift.

## 2018-09-15 NOTE — PROGRESS NOTES
PHYSICAL THERAPY DAILY NOTE Time In: 9484 Time Out: 1217 Patient Seen For: AM;Gait training;Patient education; Therapeutic exercise;Transfer training; Other (see progress notes) Subjective: patient reporting she did not want to have therapy today. Reports she would like to go home soon. Reports she does not like getting up early Objective:Vital Signs: 
Patient Vitals for the past 12 hrs: 
 Temp Pulse Resp BP SpO2  
09/15/18 1044 - - - - 95 % 09/15/18 0730 98.6 °F (37 °C) 74 18 116/53 98 % 09/15/18 0603 - - - - 90 % Pain level:No c/o pain during treatment Pain location:NA Pain interventions:NA Patient education:energy conservation,transfer training, gait training, fall precautions, activity pacing, body mechanics,rollator parts management, breathing techniques during functional mobility, Patient verbalizing understanding and demonstrating partial understanding of patient education. Recommend follow up education. Interdisciplinary Communication:NA Other (comment) (Low Vision, Fall Risk) GROSS ASSESSMENT Daily Assessment NA  
 
 
BED/MAT MOBILITY Daily Assessment Rolling Right : 0 (Not tested) Rolling Left : 0 (Not tested) Supine to Sit : 0 (Not tested) Sit to Supine : 0 (Not tested) TRANSFERS Daily Assessment Increased time and effort to complete with cues for body mechanics and rollator parts management Transfer Type: SPT with walker Transfer Assistance : 5 (Supervision/setup) Sit to Stand Assistance: Supervision Car Transfers: Not tested GAIT Daily Assessment Amount of Assistance: 5 (Stand-by assistance) Distance (ft): 150 Feet (ft) Assistive Device: Walker, rollator Gait training with cues to control gait speed and RR with improved inhale and exhale STEPS or STAIRS Daily Assessment Steps/Stairs Ambulated (#): 0 Level of Assist : 0 (Not tested) BALANCE Daily Assessment Sitting - Static: Good (unsupported) Sitting - Dynamic: Good (unsupported) Standing - Static: Fair Standing - Dynamic : Impaired WHEELCHAIR MOBILITY Daily Assessment Curbs/Ramps Assist Required (FIM Score): 0 (Not tested) Wheelchair Setup Assist Required : 0 (Not tested) LOWER EXTREMITY EXERCISES Daily Assessment Increased time and effort to complete with multiple and frequent rest breaks. Cues for breathing pattern and correct form Extremity: Both Exercise Type #1: Seated lower extremity strengthening Sets Performed: 2 Reps Performed: 10 Level of Assist: Minimal assistance SEATED EXERCISES Sets Reps Comments Ankle Pumps 1 20 Hip Flexion 2 10 [address] 2 10 Hip Adduction/Ball Squeeze 2 10 Hip Abduction with green Theraband 2 10 Hamstring Curls with green Theraband 2 10 Hip Extension with green Theraband 2 10 Assessment: Progressing towards goals Patient returned to room at end of treatment and remained up in recliner with LEs elevated and with needs in reach. 02 at 3 lpm 
 
Plan of Care: Continue with POC and progress as tolerated. Grzegorz Schreiber, PT 
9/15/2018

## 2018-09-15 NOTE — PROGRESS NOTES
09/15/18 1323 Time Spent With Patient Time In [phone] Time Out 1144 Grooming 209 [facility] Comments sat at sink to complete Upper Body Bathing [address], Upper S Comments set up  Standing at sink Lower Body Bathing [address], Lower  S Comments standing and sitting at sink Upper Body Dressing Dressing Assistance  Mod A Comments Assistance with bringing shirt around and buttoning Lower Body Dressing Dressing Assistance  Min A Position Performed Bending forward method;Seated edge of bed Comments \"I can do this\" Patient seen for am ADLs. Did not want to shower. Ambulated to bathroom and wanted to stand to compete ADL at sink. Put bedside commode behind for sitting if needed. Patient washed up standing and sat to wash feet and complete grooming. Wanted to dress at EOB. Able to put feet on EOB to complete donning shorts. Patient then taken to PT. Continue POC. Jass Reno 9/15/2018

## 2018-09-15 NOTE — PROGRESS NOTES
Warfarin dosing per pharmacist 
 
Corinne Oates Farhad Westbrook is a 77 y.o. female. Indication:  S/p AVR Goal INR:  2-3 Home dose:  5 mg Sun and 2.5 mg all other days  (Weekly dose of 20 mg) Risk factors or significant drug interactions:  none Other anticoagulants:  Lovenox bridge Daily Monitoring Date  INR     Warfarin dose  HGB              Notes 9/8  3.4  Hold   9.3 
9/9  2.8  Hold   9.0  
9/10  2.0  Hold   9.6 9/11  1.5  5 mg    9.5 9/12  1.3  5 mg   10.5 9/13  1.3  5 mg   --- 
9/14  1.3  (15 mg)*  ---  *patient mistakenly given 7.5 mg dose X 2, Jocy Shay entered 9/15  1.4  Hold   --- Pharmacy has been consulted to dose warfarin for this patient due to a history of AVR (mechanical). The INR goal per the patient's home warfarin clinic is 2-3. Patient initially presented with a supratherapeutic INR that has trended down. INR trending upwards slowly, but still subtherapeutic at 1.4. Per review of chart, appears that day shift nurse yesterday gave 7.5 mg dose but charted on the wrong order, then night shift nurse thought the 7.5 mg dose had not yet been given, so requested a dose and gave it as well before the mistake was caught. Therefore, the patient received a total of 15 mg on 9/14/18. Given that patients INR was recently elevated on a total weekly dose of 20 mg, will hold warfarin dose tonight and see what the INR looks like tomorrow. It may take several days to see the full effect of the warfarin given yesterday. Following daily INR. Thank you, 
Hailey Abdul, PharmD Clinical Pharmacist 
[phone]

## 2018-09-15 NOTE — PROGRESS NOTES
[facility] PROGRESS NOTE [address] Admit Date: 9/11/2018 Admit Diagnosis: debility; Physical debility Subjective  
 
Vss. Afebrile. Continues to do well. No acute chest pain, sob, cough. Comfortable on 3L O2. Therapy well tolerated. Objective:  
 
Current Facility-Administered Medications Medication Dose Route Frequency  furosemide (LASIX) tablet 40 mg  40 mg Oral DAILY  warfarin (COUMADIN) tablet 5 mg  5 mg Oral QPM  
 traZODone (DESYREL) tablet 25-50 mg  25-50 mg Oral QHS  
 0.9% sodium chloride infusion 250 mL  250 mL IntraVENous PRN  
 acetaminophen (TYLENOL) tablet 650 mg  650 mg Oral Q6H PRN  
 sodium chloride (NS) flush 5-10 mL  5-10 mL IntraVENous Q8H  
 sodium chloride (NS) flush 5-10 mL  5-10 mL IntraVENous PRN  
 albuterol (PROVENTIL VENTOLIN) nebulizer solution 2.5 mg  2.5 mg Nebulization Q4H PRN  
 alum-mag hydroxide-simeth (MYLANTA) oral suspension 30 mL  30 mL Oral Q4H PRN  
 ascorbic acid (vitamin C) (VITAMIN C) tablet 500 mg  500 mg Oral DAILY  aspirin delayed-release tablet 81 mg  81 mg Oral DAILY  bisacodyl (DULCOLAX) suppository 10 mg  10 mg Rectal DAILY PRN  
 budesonide (PULMICORT) 500 mcg/2 ml nebulizer suspension  500 mcg Nebulization BID RT And  
 albuterol (PROVENTIL VENTOLIN) nebulizer solution 2.5 mg  2.5 mg Nebulization Q6HWA RT  
 carvedilol (COREG) tablet 6.25 mg  6.25 mg Oral BID WITH MEALS  enoxaparin (LOVENOX) injection 100 mg  100 mg SubCUTAneous Q24H  
 escitalopram oxalate (LEXAPRO) tablet 20 mg  20 mg Oral DAILY  influenza vaccine 2018-19 (6 mos+)(PF) (FLUARIX QUAD/FLULAVAL QUAD) injection 0.5 mL  0.5 mL IntraMUSCular PRIOR TO DISCHARGE  insulin lispro (HUMALOG) injection   SubCUTAneous AC&HS  isosorbide mononitrate ER (IMDUR) tablet 30 mg  30 mg Oral DAILY  morphine 10 mg/mL injection 2 mg  2 mg IntraVENous Q4H PRN  
 ondansetron (ZOFRAN ODT) tablet 4 mg  4 mg Oral Q6H PRN  
  oxyCODONE IR (ROXICODONE) tablet 5-10 mg  5-10 mg Oral Q4H PRN  pantoprazole (PROTONIX) tablet 40 mg  40 mg Oral ACB  polyethylene glycol (MIRALAX) packet 17 g  17 g Oral DAILY PRN  
 simvastatin (ZOCOR) tablet 20 mg  20 mg Oral QHS  tiotropium (SPIRIVA) inhalation capsule 18 mcg  18 mcg Inhalation DAILY  traMADol (ULTRAM) tablet 50 mg  50 mg Oral Q4H PRN Review of Systems:Denies chest pain, shortness of breath, cough, headache, visual problems, abdominal pain, dysurea, calf pain. Pertinent positives are as noted in the medical records and unremarkable otherwise. Visit Vitals  /53  Pulse 74  Temp 98.6 °F (37 °C)  Resp 18  SpO2 98% Physical Exam:  
General: Alert and age appropriately oriented. No acute cardio respiratory distress. HEENT: Normocephalic,no scleral icterus Oral mucosa moist without cyanosis, no JVD Lungs: No rales. + soft expiratory wheeze. Heart: Regular rate and rhythm, S1, S2 No  murmurs, clicks, rub or gallops Abdomen: Soft, non-tender, nondistended. Bowel sounds present. No organomegaly. Genitourinary: defer Neuromuscular: Federica Richar Non focal motor, sensory exam. Mild generalized weakness. Skin/extremity: No rashes, no erythema. No calf tenderness BLE 
+ trace BLE edema. Functional Assessment: 
   
   
Balance Sitting - Static: Good (unsupported) (09/14/18 1500) Sitting - Dynamic: Good (unsupported) (09/14/18 1500) Standing - Static: Fair (09/14/18 1500) Standing - Dynamic : Impaired (09/14/18 1500) Terrell Levels Fall Risk Assessment: 
[facility] Labs Risk Mobility: Ambulates or transfers with assist devices or assistance (09/14/18 0513) Mobility Interventions: Communicate number of staff needed for ambulation/transfer;OT consult for ADLs; Patient to call before getting OOB;PT Consult for mobility concerns;PT Consult for assist device competence;Strengthening exercises (ROM-active/passive); Utilize walker, cane, or other assistive device (09/14/18 2343) Mentation: Alert, oriented x 3 (09/14/18 2343) Medication: Patient receiving anticonvulsants, sedatives(tranquilizers), psychotropics or hypnotics, hypoglycemics, narcotics, sleep aids, antihypertensives, laxatives, or diuretics (09/14/18 2343) Medication Interventions: Assess postural VS orthostatic hypotension; Evaluate medications/consider consulting pharmacy; Patient to call before getting OOB; Teach patient to arise slowly (09/14/18 2343) Elimination: Needs assistance with toileting (09/14/18 2343) Elimination Interventions: Call light in reach; Patient to call for help with toileting needs (09/14/18 2343) Prior Fall History: Before admission in past 12 months _home or previous inpatient care) (09/14/18 2343) History of Falls Interventions: Consult care management for discharge planning;Evaluate medications/consider consulting pharmacy; Investigate reason for fall (09/14/18 2343) Total Score: 4 (09/14/18 2343) Standard Fall Precautions: Yes (09/14/18 2343) High Fall Risk: Yes (09/14/18 2343) Speech Assessment: 
    
 
Ambulation: 
Gait Distance (ft): 180 Feet (ft) (180 ft x 1  120 ft x 1) (09/14/18 1500) Assistive Device: Walker, rollator (09/14/18 1500) Rail Use: Right  (09/12/18 1600) Labs/Studies: 
Recent Results (from the past 72 hour(s)) GLUCOSE, POC Collection Time: 09/12/18 11:36 AM  
Result Value Ref Range Glucose (POC) 121 (H) 65 - 100 mg/dL GLUCOSE, POC Collection Time: 09/12/18  4:31 PM  
Result Value Ref Range Glucose (POC) 137 (H) 65 - 100 mg/dL GLUCOSE, POC Collection Time: 09/12/18  8:38 PM  
Result Value Ref Range Glucose (POC) 219 (H) 65 - 100 mg/dL PROTHROMBIN TIME + INR Collection Time: 09/13/18  5:48 AM  
Result Value Ref Range Prothrombin time 15.8 (H) 11.5 - 14.5 sec INR 1.3 GLUCOSE, POC Collection Time: 09/13/18  7:24 AM  
Result Value Ref Range Glucose (POC) 98 65 - 100 mg/dL GLUCOSE, POC Collection Time: 09/13/18 11:13 AM  
Result Value Ref Range Glucose (POC) 141 (H) 65 - 100 mg/dL METABOLIC PANEL, BASIC Collection Time: 09/13/18 11:14 AM  
Result Value Ref Range Sodium 136 136 - 145 mmol/L Potassium 4.7 3.5 - 5.1 mmol/L Chloride 92 (L) 98 - 107 mmol/L  
 CO2 33 (H) 21 - 32 mmol/L Anion gap 11 7 - 16 mmol/L Glucose 132 (H) 65 - 100 mg/dL BUN 77 (H) 8 - 23 MG/DL Creatinine 2.48 (H) 0.6 - 1.0 MG/DL  
 GFR est AA 25 (L) >60 ml/min/1.73m2 GFR est non-AA 20 (L) >60 ml/min/1.73m2 Calcium 9.3 8.3 - 10.4 MG/DL  
GLUCOSE, POC Collection Time: 09/13/18  4:16 PM  
Result Value Ref Range Glucose (POC) 135 (H) 65 - 100 mg/dL GLUCOSE, POC Collection Time: 09/13/18  7:15 PM  
Result Value Ref Range Glucose (POC) 208 (H) 65 - 100 mg/dL PROTHROMBIN TIME + INR Collection Time: 09/14/18  6:04 AM  
Result Value Ref Range Prothrombin time 15.5 (H) 11.5 - 14.5 sec INR 1.3 METABOLIC PANEL, BASIC Collection Time: 09/14/18  6:04 AM  
Result Value Ref Range Sodium 140 136 - 145 mmol/L Potassium 3.8 3.5 - 5.1 mmol/L Chloride 95 (L) 98 - 107 mmol/L  
 CO2 35 (H) 21 - 32 mmol/L Anion gap 10 7 - 16 mmol/L Glucose 85 65 - 100 mg/dL BUN 74 (H) 8 - 23 MG/DL Creatinine 2.35 (H) 0.6 - 1.0 MG/DL  
 GFR est AA 26 (L) >60 ml/min/1.73m2 GFR est non-AA 22 (L) >60 ml/min/1.73m2 Calcium 9.2 8.3 - 10.4 MG/DL  
GLUCOSE, POC Collection Time: 09/14/18  7:27 AM  
Result Value Ref Range Glucose (POC) 105 (H) 65 - 100 mg/dL GLUCOSE, POC Collection Time: 09/14/18 11:34 AM  
Result Value Ref Range Glucose (POC) 138 (H) 65 - 100 mg/dL GLUCOSE, POC Collection Time: 09/14/18  4:35 PM  
Result Value Ref Range Glucose (POC) 157 (H) 65 - 100 mg/dL GLUCOSE, POC Collection Time: 09/14/18  8:18 PM  
Result Value Ref Range Glucose (POC) 197 (H) 65 - 100 mg/dL PROTHROMBIN TIME + INR Collection Time: 09/15/18  6:24 AM  
Result Value Ref Range Prothrombin time 16.3 (H) 11.5 - 14.5 sec INR 1.4 METABOLIC PANEL, BASIC Collection Time: 09/15/18  6:24 AM  
Result Value Ref Range Sodium 142 136 - 145 mmol/L Potassium 4.1 3.5 - 5.1 mmol/L Chloride 100 98 - 107 mmol/L  
 CO2 34 (H) 21 - 32 mmol/L Anion gap 8 7 - 16 mmol/L Glucose 88 65 - 100 mg/dL BUN 77 (H) 8 - 23 MG/DL Creatinine 2.10 (H) 0.6 - 1.0 MG/DL  
 GFR est AA 30 (L) >60 ml/min/1.73m2 GFR est non-AA 25 (L) >60 ml/min/1.73m2 Calcium 9.5 8.3 - 10.4 MG/DL  
GLUCOSE, POC Collection Time: 09/15/18  7:48 AM  
Result Value Ref Range Glucose (POC) 99 65 - 100 mg/dL Assessment:  
 
Problem List as of 9/15/2018  Date Reviewed: 8/31/2018 Codes Class Noted - Resolved Coagulopathy ([facility] INR >4 on admission 9/7/18 ICD-10-CM: D68.9 ICD-9-CM: 286.9  9/8/2018 - Present Traumatic hematoma of left knee ICD-10-CM: U15.76JX ICD-9-CM: 924.11  9/8/2018 - Present Debility ICD-10-CM: R53.81 ICD-9-CM: 799.3  9/8/2018 - Present Anemia ICD-10-CM: D64.9 ICD-9-CM: 285.9  9/7/2018 - Present Chronic respiratory failure with hypoxia (HCC) (Chronic) ICD-10-CM: J96.11 
ICD-9-CM: 518.83, 799.02  9/7/2018 - Present Acute kidney injury superimposed on chronic kidney disease ([facility] ICD-10-CM: N17.9, N18.9 ICD-9-CM: 866.00, 585.9  9/7/2018 - Present Encounter for immunization ICD-10-CM: F80 ICD-9-CM: V03.89  8/21/2018 - Present Obesity, morbid ([facility] ICD-10-CM: E66.01 
ICD-9-CM: 278.01  6/8/2018 - Present * (Principal)Physical debility ICD-10-CM: R53.81 ICD-9-CM: 799.3  5/2/2018 - Present Closed nondisplaced fracture of shaft of fifth metacarpal bone of left hand ICD-10-CM: D88.574U ICD-9-CM: 815.03  4/28/2018 - Present Subdural hematoma (HCC) ICD-10-CM: I62.00 ICD-9-CM: 432.1  4/27/2018 - Present Long term (current) use of anticoagulants (Chronic) ICD-10-CM: Z79.01 
ICD-9-CM: V58.61  4/19/2018 - Present Dysthymia ICD-10-CM: F34.1 ICD-9-CM: 300.4  4/5/2018 - Present Type 2 diabetes mellitus with nephropathy (HCC) (Chronic) ICD-10-CM: E11.21 
ICD-9-CM: 250.40, 583.81  12/18/2017 - Present Pulmonary hypertension (HCC) (Chronic) ICD-10-CM: I27.20 ICD-9-CM: 416.8  6/15/2016 - Present S/P AVR (aortic valve replacement) (Chronic) ICD-10-CM: Z95.2 ICD-9-CM: V43.3  Unknown - Present Overview Signed 2/23/2016 11:04 AM by Jerica Major Mechanical/Artific Cardiomyopathy ([facility] (Chronic) ICD-10-CM: I42.9 ICD-9-CM: 425.4  Unknown - Present Osteopenia ICD-10-CM: M85.80 ICD-9-CM: 733.90  Unknown - Present HLD (hyperlipidemia) (Chronic) ICD-10-CM: Y71.0 ICD-9-CM: 272.4  Unknown - Present Osteoarthritis ICD-10-CM: M19.90 ICD-9-CM: 715.90  Unknown - Present  
   
 ICD (implantable cardioverter-defibrillator) in place ICD-10-CM: Z95.810 ICD-9-CM: V45.02  10/2/2014 - Present Overview Signed 10/2/2014  4:58 PM by Teddy Reeves III [employer] single-chamber ICD implantation 10/20/14 Diabetes mellitus type 2, diet-controlled (HCC) (Chronic) ICD-10-CM: E11.9 ICD-9-CM: 250.00  8/28/2014 - Present COPD (chronic obstructive pulmonary disease) (HCC) (Chronic) ICD-10-CM: J44.9 ICD-9-CM: 098  4/2/2014 - Present REJI (obstructive sleep apnea)-cpap (Chronic) ICD-10-CM: G47.33 
ICD-9-CM: 327.23  4/2/2014 - Present CKD (chronic kidney disease) stage 3, GFR 30-59 ml/min (Chronic) ICD-10-CM: N18.3 ICD-9-CM: 585.3  7/10/2013 - Present Anticoagulated on Coumadin (Chronic) ICD-10-CM: Z51.81, Z79.01 
ICD-9-CM: V58.83, V58.61  7/9/2013 - Present Overview Signed 7/9/2013  4:16 PM by Aneudy Silverman NP  
  S/P AVR 
  
  
   
 CAD (coronary artery disease) (Chronic) ICD-10-CM: I25.10 ICD-9-CM: 414.00  1/20/2013 - Present Overview Signed 5/20/2014 10:05 AM by Joann Foster NP  
  5/8/14 PCI LAD with stent placed Chronic combined systolic and diastolic heart failure (HCC) (Chronic) ICD-10-CM: I50.42 
ICD-9-CM: 428.42  1/20/2013 - Present Overview Addendum 4/28/2018  4:53 AM by Arielle Nathan MD  
  5/8/14 ECHO:  EF 10-15% 12/2017:  EF 25-30% Essential hypertension, benign (Chronic) ICD-10-CM: I10 
ICD-9-CM: 401.1  1/20/2013 - Present MDS (myelodysplastic syndrome) (HCC) (Chronic) ICD-10-CM: D46.9 ICD-9-CM: 238.75  12/17/2011 - Present Overview Addendum 8/29/2013 11:06 AM by Astrid Monday Procrit started in August, 2011 12/18/11 Procrit weekly and Iron stores. 5-12 12-13-12 good response to 3 weekly procrit did not need it last time 3-7-13 Pt doing well. Just wanted a \"check-up. \" Responding to Procrit every three weeks. 8-29-13 patient has missed some injections on recently restarted hemoglobin only issue , takes oral iron iron stores each time Iron deficiency anemia due to chronic blood loss (Chronic) ICD-10-CM: D50.0 ICD-9-CM: 280.0  7/29/2009 - Present RESOLVED: Routine general medical examination at a health care facility ICD-10-CM: Z00.00 ICD-9-CM: V70.0  12/16/2016 - 4/27/2018 Assessment: Profound Physical Debility s/p admitted with moderately severe anemia 
   
Continue daily physician medical management: S/p mech AVR; chronic coumadin therapy but such hi risk of bleeding due to multiple falls.   
- on coumadin bridged with lovenox. INR still subtherapeutic. pharmacy dosing. CHF FELIZ on CKD thus decreasing Lasix; change to 40 daily 9/15. Monitor response. - no s/s of decompensation clinically. - Cr mildly improved since yesterday. 77/2.10 Pain Control:  - prn ultram, prn roxicodone. Occasionally. D/c prn morphine inj 
 
   
Hypertension -   On imdur,  
   
Acute on chronic anemia; possible MDS followed by Dr Diamond Stanley.  
-  Continue epocrit, ferrous sulfate Pneumonia prophylaxis- Incentive spirometer every hour while awake 
   
Chronic hypoxic respir failure; cont RT txs. On Trilogy at [facility] [employer] - Currently on 3L per NC 
  
Insomnia; add Trazadone, or dtg can bring in her Melatonin 
     
Diabetes mellitus -   
-diet controlled   
   
Urinary retention/ neurogenic bladder - no s/s of retention, on diuretics. -having urinary incont; will try timed voids due to urgency 
 
   
bowel program - add bowel program prn 
   
GERD - continue PPI. Previous hx of GIB 
   
Time spent was 25 minutes with over 1/2 in direct patient care/examination, consultation and coordination of care. Signed By: Jen Perez MD   
 September 15, 2018

## 2018-09-15 NOTE — PROGRESS NOTES
Problem: Falls - Risk of 
Goal: *Absence of Falls Document Art Aritah Fall Risk and appropriate interventions in the flowsheet. Outcome: Progressing Towards Goal 
Fall Risk Interventions: 
Mobility Interventions: Communicate number of staff needed for ambulation/transfer, OT consult for ADLs, Patient to call before getting OOB, PT Consult for mobility concerns, PT Consult for assist device competence, Strengthening exercises (ROM-active/passive), Utilize walker, cane, or other assistive device Medication Interventions: Assess postural VS orthostatic hypotension, Evaluate medications/consider consulting pharmacy, Patient to call before getting OOB, Teach patient to arise slowly Elimination Interventions: Call light in reach, Patient to call for help with toileting needs History of Falls Interventions: Consult care management for discharge planning, Evaluate medications/consider consulting pharmacy, Investigate reason for fall

## 2018-09-16 LAB
ANION GAP SERPL CALC-SCNC: 10 MMOL/L (ref 7–16)
BUN SERPL-MCNC: 65 MG/DL (ref 8–23)
CALCIUM SERPL-MCNC: 9.3 MG/DL (ref 8.3–10.4)
CHLORIDE SERPL-SCNC: 102 MMOL/L (ref 98–107)
CO2 SERPL-SCNC: 32 MMOL/L (ref 21–32)
CREAT SERPL-MCNC: 1.96 MG/DL (ref 0.6–1)
GLUCOSE BLD STRIP.AUTO-MCNC: 126 MG/DL (ref 65–100)
GLUCOSE BLD STRIP.AUTO-MCNC: 128 MG/DL (ref 65–100)
GLUCOSE BLD STRIP.AUTO-MCNC: 156 MG/DL (ref 65–100)
GLUCOSE BLD STRIP.AUTO-MCNC: 210 MG/DL (ref 65–100)
GLUCOSE SERPL-MCNC: 86 MG/DL (ref 65–100)
INR PPP: 1.6
POTASSIUM SERPL-SCNC: 4.7 MMOL/L (ref 3.5–5.1)
PROTHROMBIN TIME: 18.3 SEC (ref 11.5–14.5)
SODIUM SERPL-SCNC: 144 MMOL/L (ref 136–145)

## 2018-09-16 PROCEDURE — 80048 BASIC METABOLIC PNL TOTAL CA: CPT

## 2018-09-16 PROCEDURE — 94640 AIRWAY INHALATION TREATMENT: CPT

## 2018-09-16 PROCEDURE — 82962 GLUCOSE BLOOD TEST: CPT

## 2018-09-16 PROCEDURE — 74011250636 HC RX REV CODE- 250/636: Performed by: PHYSICAL MEDICINE & REHABILITATION

## 2018-09-16 PROCEDURE — 99232 SBSQ HOSP IP/OBS MODERATE 35: CPT | Performed by: PHYSICAL MEDICINE & REHABILITATION

## 2018-09-16 PROCEDURE — 65310000000 HC RM PRIVATE REHAB

## 2018-09-16 PROCEDURE — 36415 COLL VENOUS BLD VENIPUNCTURE: CPT

## 2018-09-16 PROCEDURE — 74011000250 HC RX REV CODE- 250: Performed by: PHYSICAL MEDICINE & REHABILITATION

## 2018-09-16 PROCEDURE — 74011636637 HC RX REV CODE- 636/637: Performed by: PHYSICAL MEDICINE & REHABILITATION

## 2018-09-16 PROCEDURE — 74011250637 HC RX REV CODE- 250/637: Performed by: PHYSICAL MEDICINE & REHABILITATION

## 2018-09-16 PROCEDURE — 94760 N-INVAS EAR/PLS OXIMETRY 1: CPT

## 2018-09-16 PROCEDURE — 85610 PROTHROMBIN TIME: CPT

## 2018-09-16 RX ORDER — WARFARIN SODIUM 5 MG/1
5 TABLET ORAL EVERY EVENING
Status: DISCONTINUED | OUTPATIENT
Start: 2018-09-16 | End: 2018-09-19

## 2018-09-16 RX ADMIN — ALBUTEROL SULFATE 2.5 MG: 2.5 SOLUTION RESPIRATORY (INHALATION) at 12:30

## 2018-09-16 RX ADMIN — INSULIN LISPRO 4 UNITS: 100 INJECTION, SOLUTION INTRAVENOUS; SUBCUTANEOUS at 21:38

## 2018-09-16 RX ADMIN — ASPIRIN 81 MG: 81 TABLET, COATED ORAL at 08:52

## 2018-09-16 RX ADMIN — INSULIN LISPRO 2 UNITS: 100 INJECTION, SOLUTION INTRAVENOUS; SUBCUTANEOUS at 12:00

## 2018-09-16 RX ADMIN — TRAZODONE HYDROCHLORIDE 50 MG: 50 TABLET ORAL at 21:18

## 2018-09-16 RX ADMIN — TIOTROPIUM BROMIDE 18 MCG: 18 CAPSULE ORAL; RESPIRATORY (INHALATION) at 05:17

## 2018-09-16 RX ADMIN — TRAMADOL HYDROCHLORIDE 50 MG: 50 TABLET, FILM COATED ORAL at 21:18

## 2018-09-16 RX ADMIN — Medication 10 ML: at 05:28

## 2018-09-16 RX ADMIN — OXYCODONE HYDROCHLORIDE AND ACETAMINOPHEN 500 MG: 500 TABLET ORAL at 08:45

## 2018-09-16 RX ADMIN — ALBUTEROL SULFATE 2.5 MG: 2.5 SOLUTION RESPIRATORY (INHALATION) at 18:11

## 2018-09-16 RX ADMIN — FUROSEMIDE 40 MG: 40 TABLET ORAL at 08:45

## 2018-09-16 RX ADMIN — ALBUTEROL SULFATE 2.5 MG: 2.5 SOLUTION RESPIRATORY (INHALATION) at 05:17

## 2018-09-16 RX ADMIN — SIMVASTATIN 20 MG: 20 TABLET, FILM COATED ORAL at 21:18

## 2018-09-16 RX ADMIN — PANTOPRAZOLE SODIUM 40 MG: 40 TABLET, DELAYED RELEASE ORAL at 05:28

## 2018-09-16 RX ADMIN — ENOXAPARIN SODIUM 100 MG: 100 INJECTION SUBCUTANEOUS at 17:36

## 2018-09-16 RX ADMIN — BUDESONIDE 500 MCG: 0.5 INHALANT RESPIRATORY (INHALATION) at 05:17

## 2018-09-16 RX ADMIN — TRAMADOL HYDROCHLORIDE 50 MG: 50 TABLET, FILM COATED ORAL at 08:45

## 2018-09-16 RX ADMIN — WARFARIN SODIUM 5 MG: 5 TABLET ORAL at 17:36

## 2018-09-16 RX ADMIN — BUDESONIDE 500 MCG: 0.5 INHALANT RESPIRATORY (INHALATION) at 18:11

## 2018-09-16 RX ADMIN — CARVEDILOL 6.25 MG: 6.25 TABLET, FILM COATED ORAL at 08:45

## 2018-09-16 RX ADMIN — ISOSORBIDE MONONITRATE 30 MG: 30 TABLET, EXTENDED RELEASE ORAL at 08:46

## 2018-09-16 RX ADMIN — Medication 10 ML: at 15:01

## 2018-09-16 RX ADMIN — CARVEDILOL 6.25 MG: 6.25 TABLET, FILM COATED ORAL at 17:36

## 2018-09-16 RX ADMIN — ESCITALOPRAM OXALATE 20 MG: 10 TABLET ORAL at 08:45

## 2018-09-16 NOTE — PROGRESS NOTES
End Of Shift Functional Summary, Nursing Hourly rounding completed throughout shift    
   
TOILETING:   
Does patient need assist with adjusting pants up or down and/or pericare? yes If yes, please indicate what the patient needs help with:  Pants and madi care  (i.e. pants up, pants down, pericare) Pt uses walker. Does the patient require extra time? yes Does the patient require standby assistance? no 
Does the patient require contact guard assistance? yes Does the patient require more than one staff member for assistance? no 
   
TOILET TRANSFER:   
Pt requires minimal assistance.  Pt uses walker. Does the patient require extra time? yes Does the patient require contact guard assistance? yes Does the patient require more than one staff member for assistance? no 
   
BLADDER:   
Pt does not have a medina catheter that staff manages.  Pt does not take medication.  If so, please indicate which medication:   
Pt is continent. of bladder and voids in bedside commode  Pt requires staff to empty device Pt has had 0 bladder accidents during this shift .  (An accident is when the episode is not contained in a brief AND/OR the clothing/linen requires changing/cleaning up.)    
   
   
Documentation reviewed and plan of care discussed/reviewed with  
patient and oncoming nurse during the shift.

## 2018-09-16 NOTE — PROGRESS NOTES
End Of Shift Functional Summary, Nursing TOILETING:   
Does patient need assist with adjusting pants up or down and/or pericare? no If yes, please indicate what the patient needs help with:    (i.e. pants up, pants down, pericare) Pt uses walker. Does the patient require extra time? yes Does the patient require standby assistance? no 
Does the patient require contact guard assistance? no 
Does the patient require more than one staff member for assistance? no 
 
TOILET TRANSFER:   
Pt requires minimal assistance. Pt uses walker. Does the patient require extra time? yes Does the patient require contact guard assistance? yes Does the patient require more than one staff member for assistance? no 
 
BLADDER:   
Pt does not have a medina catheter that staff manages. Pt does not take medication. If so, please indicate which medication:   
Pt is continent. of bladder and voids in toilet  Pt requires staff to empty device Pt has had 0 bladder accidents during this shift .  (An accident is when the episode is not contained in a brief AND/OR the clothing/linen requires changing/cleaning up.) BOWEL:  Pt does take medication. Pt is continent of bowel and uses toilet. Pt requires staff to empty device    Pt has had 0 bowel accidents during this shift . (An accident is when the episode is not contained in a brief AND/OR the clothing/linen requires changing/cleaning up.) Documentation reviewed and plan of care discussed/reviewed with  
patient and oncoming nurse during the shift.

## 2018-09-16 NOTE — PROGRESS NOTES
Warfarin dosing per pharmacist 
 
Wayne Frazier Ra Mary is a 77 y.o. female. Indication:  S/p AVR Goal INR:  2-3 Home dose:  5 mg Sun and 2.5 mg all other days  (Weekly dose of 20 mg) Risk factors or significant drug interactions:  none Other anticoagulants:  Lovenox bridge Daily Monitoring Date  INR     Warfarin dose  HGB              Notes 9/8  3.4  Hold   9.3 
9/9  2.8  Hold   9.0  
9/10  2.0  Hold   9.6 9/11  1.5  5 mg    9.5 9/12  1.3  5 mg   10.5 9/13  1.3  5 mg   --- 
9/14  1.3  (15 mg)*  ---  *patient mistakenly given 7.5 mg dose X 2, Darra Area entered 9/15  1.4  Hold   --- 
9/16  1.6  5 mg   --- Pharmacy has been consulted to dose warfarin for this patient due to a history of AVR (mechanical). The INR goal per the patient's home warfarin clinic is 2-3. Patient initially presented with a supratherapeutic INR that has trended down. INR continues to trend upward, but still subtherapeutic at 1.6. Will resume dosing tonight cautiously with 5 mg. Still may not yet have seen full impact of 15 mg the patient received on 9/14. Okay to continue lovenox bridge therapy until INR therapeutic. Following daily INR. Thank you, 
Juana Medina, PharmD Clinical Pharmacist 
[phone]

## 2018-09-16 NOTE — PROGRESS NOTES
[facility] PROGRESS NOTE [address] Admit Date: 9/11/2018 Admit Diagnosis: debility; Physical debility Subjective  
 
Vss. Afebrile. No acute distress, no acute complaints. appetite good. Objective:  
 
Current Facility-Administered Medications Medication Dose Route Frequency  Warfarin Pharmacy Dosing Placeholder - On Hold   Other Rx Dosing/Monitoring  furosemide (LASIX) tablet 40 mg  40 mg Oral DAILY  traZODone (DESYREL) tablet 25-50 mg  25-50 mg Oral QHS  
 0.9% sodium chloride infusion 250 mL  250 mL IntraVENous PRN  
 acetaminophen (TYLENOL) tablet 650 mg  650 mg Oral Q6H PRN  
 sodium chloride (NS) flush 5-10 mL  5-10 mL IntraVENous Q8H  
 sodium chloride (NS) flush 5-10 mL  5-10 mL IntraVENous PRN  
 albuterol (PROVENTIL VENTOLIN) nebulizer solution 2.5 mg  2.5 mg Nebulization Q4H PRN  
 alum-mag hydroxide-simeth (MYLANTA) oral suspension 30 mL  30 mL Oral Q4H PRN  
 ascorbic acid (vitamin C) (VITAMIN C) tablet 500 mg  500 mg Oral DAILY  aspirin delayed-release tablet 81 mg  81 mg Oral DAILY  bisacodyl (DULCOLAX) suppository 10 mg  10 mg Rectal DAILY PRN  
 budesonide (PULMICORT) 500 mcg/2 ml nebulizer suspension  500 mcg Nebulization BID RT And  
 albuterol (PROVENTIL VENTOLIN) nebulizer solution 2.5 mg  2.5 mg Nebulization Q6HWA RT  
 carvedilol (COREG) tablet 6.25 mg  6.25 mg Oral BID WITH MEALS  enoxaparin (LOVENOX) injection 100 mg  100 mg SubCUTAneous Q24H  
 escitalopram oxalate (LEXAPRO) tablet 20 mg  20 mg Oral DAILY  influenza vaccine 2018-19 (6 mos+)(PF) (FLUARIX QUAD/FLULAVAL QUAD) injection 0.5 mL  0.5 mL IntraMUSCular PRIOR TO DISCHARGE  insulin lispro (HUMALOG) injection   SubCUTAneous AC&HS  isosorbide mononitrate ER (IMDUR) tablet 30 mg  30 mg Oral DAILY  ondansetron (ZOFRAN ODT) tablet 4 mg  4 mg Oral Q6H PRN  
 oxyCODONE IR (ROXICODONE) tablet 5-10 mg  5-10 mg Oral Q4H PRN  
  pantoprazole (PROTONIX) tablet 40 mg  40 mg Oral ACB  polyethylene glycol (MIRALAX) packet 17 g  17 g Oral DAILY PRN  
 simvastatin (ZOCOR) tablet 20 mg  20 mg Oral QHS  tiotropium (SPIRIVA) inhalation capsule 18 mcg  18 mcg Inhalation DAILY  traMADol (ULTRAM) tablet 50 mg  50 mg Oral Q4H PRN Review of Systems:Denies chest pain, shortness of breath, cough, headache, visual problems, abdominal pain, dysurea, calf pain. Pertinent positives are as noted in the medical records and unremarkable otherwise. Visit Vitals  /82 (BP 1 Location: Left arm)  Pulse 68  Temp 98.7 °F (37.1 °C)  Resp 16  SpO2 98% Physical Exam:  
General: Alert and age appropriately oriented. No acute cardio respiratory distress. HEENT: Normocephalic,no scleral icterus Oral mucosa moist without cyanosis, no JVD Lungs: No rales. + soft expiratory wheeze. Heart: Regular rate and rhythm, S1, S2 No  murmurs, clicks, rub or gallops Abdomen: Soft, non-tender, nondistended. Bowel sounds present. No organomegaly. Genitourinary: defer Neuromuscular: Farrah Prime Non focal motor, sensory exam. Mild generalized weakness. Skin/extremity: No rashes, no erythema. No calf tenderness BLE 
+ trace BLE edema. Functional Assessment: 
   
   
Balance Sitting - Static: Good (unsupported) (09/15/18 1400) Sitting - Dynamic: Good (unsupported) (09/15/18 1400) Standing - Static: Fair (09/15/18 1400) Standing - Dynamic : Impaired (09/15/18 1400) Pastor Coleman Fall Risk Assessment: 
Irean Corporal Risk Mobility: Ambulates or transfers with assist devices or assistance (09/16/18 1008) Mobility Interventions: Communicate number of staff needed for ambulation/transfer (09/16/18 1008) Mentation: Alert, oriented x 3 (09/16/18 1008) Medication: Patient receiving anticonvulsants, sedatives(tranquilizers), psychotropics or hypnotics, hypoglycemics, narcotics, sleep aids, antihypertensives, laxatives, or diuretics (09/16/18 1008) Medication Interventions: Assess postural VS orthostatic hypotension (09/16/18 1008) Elimination: Needs assistance with toileting (09/16/18 1008) Elimination Interventions: Call light in reach (09/16/18 1008) Prior Fall History: Before admission in past 12 months _home or previous inpatient care) (09/16/18 1008) History of Falls Interventions: Consult care management for discharge planning (09/16/18 1008) Total Score: 4 (09/16/18 1008) Standard Fall Precautions: Yes (09/15/18 2030) High Fall Risk: Yes (09/16/18 1008) Speech Assessment: 
    
 
Ambulation: 
Gait Distance (ft): 150 Feet (ft) (09/15/18 1400) Assistive Device: Walker, rollator (09/15/18 1400) Rail Use: Right  (09/12/18 1600) Labs/Studies: 
Recent Results (from the past 72 hour(s)) GLUCOSE, POC Collection Time: 09/13/18  4:16 PM  
Result Value Ref Range Glucose (POC) 135 (H) 65 - 100 mg/dL GLUCOSE, POC Collection Time: 09/13/18  7:15 PM  
Result Value Ref Range Glucose (POC) 208 (H) 65 - 100 mg/dL PROTHROMBIN TIME + INR Collection Time: 09/14/18  6:04 AM  
Result Value Ref Range Prothrombin time 15.5 (H) 11.5 - 14.5 sec INR 1.3 METABOLIC PANEL, BASIC Collection Time: 09/14/18  6:04 AM  
Result Value Ref Range Sodium 140 136 - 145 mmol/L Potassium 3.8 3.5 - 5.1 mmol/L Chloride 95 (L) 98 - 107 mmol/L  
 CO2 35 (H) 21 - 32 mmol/L Anion gap 10 7 - 16 mmol/L Glucose 85 65 - 100 mg/dL BUN 74 (H) 8 - 23 MG/DL Creatinine 2.35 (H) 0.6 - 1.0 MG/DL  
 GFR est AA 26 (L) >60 ml/min/1.73m2 GFR est non-AA 22 (L) >60 ml/min/1.73m2 Calcium 9.2 8.3 - 10.4 MG/DL  
GLUCOSE, POC Collection Time: 09/14/18  7:27 AM  
Result Value Ref Range Glucose (POC) 105 (H) 65 - 100 mg/dL GLUCOSE, POC Collection Time: 09/14/18 11:34 AM  
Result Value Ref Range Glucose (POC) 138 (H) 65 - 100 mg/dL GLUCOSE, POC Collection Time: 09/14/18  4:35 PM  
Result Value Ref Range Glucose (POC) 157 (H) 65 - 100 mg/dL GLUCOSE, POC Collection Time: 09/14/18  8:18 PM  
Result Value Ref Range Glucose (POC) 197 (H) 65 - 100 mg/dL PROTHROMBIN TIME + INR Collection Time: 09/15/18  6:24 AM  
Result Value Ref Range Prothrombin time 16.3 (H) 11.5 - 14.5 sec INR 1.4 METABOLIC PANEL, BASIC Collection Time: 09/15/18  6:24 AM  
Result Value Ref Range Sodium 142 136 - 145 mmol/L Potassium 4.1 3.5 - 5.1 mmol/L Chloride 100 98 - 107 mmol/L  
 CO2 34 (H) 21 - 32 mmol/L Anion gap 8 7 - 16 mmol/L Glucose 88 65 - 100 mg/dL BUN 77 (H) 8 - 23 MG/DL Creatinine 2.10 (H) 0.6 - 1.0 MG/DL  
 GFR est AA 30 (L) >60 ml/min/1.73m2 GFR est non-AA 25 (L) >60 ml/min/1.73m2 Calcium 9.5 8.3 - 10.4 MG/DL  
GLUCOSE, POC Collection Time: 09/15/18  7:48 AM  
Result Value Ref Range Glucose (POC) 99 65 - 100 mg/dL GLUCOSE, POC Collection Time: 09/15/18 11:04 AM  
Result Value Ref Range Glucose (POC) 158 (H) 65 - 100 mg/dL GLUCOSE, POC Collection Time: 09/15/18  4:27 PM  
Result Value Ref Range Glucose (POC) 162 (H) 65 - 100 mg/dL GLUCOSE, POC Collection Time: 09/15/18  8:38 PM  
Result Value Ref Range Glucose (POC) 182 (H) 65 - 100 mg/dL PROTHROMBIN TIME + INR Collection Time: 09/16/18  5:49 AM  
Result Value Ref Range Prothrombin time 18.3 (H) 11.5 - 14.5 sec INR 1.6 METABOLIC PANEL, BASIC Collection Time: 09/16/18  5:49 AM  
Result Value Ref Range Sodium 144 136 - 145 mmol/L Potassium 4.7 3.5 - 5.1 mmol/L Chloride 102 98 - 107 mmol/L  
 CO2 32 21 - 32 mmol/L Anion gap 10 7 - 16 mmol/L Glucose 86 65 - 100 mg/dL BUN 65 (H) 8 - 23 MG/DL Creatinine 1.96 (H) 0.6 - 1.0 MG/DL  
 GFR est AA 32 (L) >60 ml/min/1.73m2 GFR est non-AA 27 (L) >60 ml/min/1.73m2  Calcium 9.3 8.3 - 10.4 MG/DL  
 GLUCOSE, POC Collection Time: 09/16/18  7:33 AM  
Result Value Ref Range Glucose (POC) 126 (H) 65 - 100 mg/dL GLUCOSE, POC Collection Time: 09/16/18 11:16 AM  
Result Value Ref Range Glucose (POC) 156 (H) 65 - 100 mg/dL Assessment:  
 
Problem List as of 9/16/2018  Date Reviewed: 8/31/2018 Codes Class Noted - Resolved Coagulopathy ([facility] INR >4 on admission 9/7/18 ICD-10-CM: D68.9 ICD-9-CM: 286.9  9/8/2018 - Present Traumatic hematoma of left knee ICD-10-CM: A31.31US ICD-9-CM: 924.11  9/8/2018 - Present Debility ICD-10-CM: R53.81 ICD-9-CM: 799.3  9/8/2018 - Present Anemia ICD-10-CM: D64.9 ICD-9-CM: 285.9  9/7/2018 - Present Chronic respiratory failure with hypoxia (HCC) (Chronic) ICD-10-CM: J96.11 
ICD-9-CM: 518.83, 799.02  9/7/2018 - Present Acute kidney injury superimposed on chronic kidney disease ([facility] ICD-10-CM: N17.9, N18.9 ICD-9-CM: 866.00, 585.9  9/7/2018 - Present Encounter for immunization ICD-10-CM: W42 ICD-9-CM: V03.89  8/21/2018 - Present Obesity, morbid ([facility] ICD-10-CM: E66.01 
ICD-9-CM: 278.01  6/8/2018 - Present * (Principal)Physical debility ICD-10-CM: R53.81 ICD-9-CM: 799.3  5/2/2018 - Present Closed nondisplaced fracture of shaft of fifth metacarpal bone of left hand ICD-10-CM: H85.651R ICD-9-CM: 815.03  4/28/2018 - Present Subdural hematoma (HCC) ICD-10-CM: I62.00 ICD-9-CM: 432.1  4/27/2018 - Present Long term (current) use of anticoagulants (Chronic) ICD-10-CM: Z79.01 
ICD-9-CM: V58.61  4/19/2018 - Present Dysthymia ICD-10-CM: F34.1 ICD-9-CM: 300.4  4/5/2018 - Present Type 2 diabetes mellitus with nephropathy (HCC) (Chronic) ICD-10-CM: E11.21 
ICD-9-CM: 250.40, 583.81  12/18/2017 - Present Pulmonary hypertension (HCC) (Chronic) ICD-10-CM: I27.20 ICD-9-CM: 416.8  6/15/2016 - Present S/P AVR (aortic valve replacement) (Chronic) ICD-10-CM: Z95.2 ICD-9-CM: V43.3  Unknown - Present Overview Signed 2/23/2016 11:04 AM by Leslie Jeronimo Mechanical/Artific Cardiomyopathy ([address] (Chronic) ICD-10-CM: I42.9 ICD-9-CM: 425.4  Unknown - Present Osteopenia ICD-10-CM: M85.80 ICD-9-CM: 733.90  Unknown - Present HLD (hyperlipidemia) (Chronic) ICD-10-CM: Y39.3 ICD-9-CM: 272.4  Unknown - Present Osteoarthritis ICD-10-CM: M19.90 ICD-9-CM: 715.90  Unknown - Present  
   
 ICD (implantable cardioverter-defibrillator) in place ICD-10-CM: Z95.810 ICD-9-CM: V45.02  10/2/2014 - Present Overview Signed 10/2/2014  4:58 PM by Manish Diego III [employer] single-chamber ICD implantation 10/20/14 Diabetes mellitus type 2, diet-controlled (HCC) (Chronic) ICD-10-CM: E11.9 ICD-9-CM: 250.00  8/28/2014 - Present COPD (chronic obstructive pulmonary disease) (HCC) (Chronic) ICD-10-CM: J44.9 ICD-9-CM: 503  4/2/2014 - Present REJI (obstructive sleep apnea)-cpap (Chronic) ICD-10-CM: G47.33 
ICD-9-CM: 327.23  4/2/2014 - Present CKD (chronic kidney disease) stage 3, GFR 30-59 ml/min (Chronic) ICD-10-CM: N18.3 ICD-9-CM: 585.3  7/10/2013 - Present Anticoagulated on Coumadin (Chronic) ICD-10-CM: Z51.81, Z79.01 
ICD-9-CM: V58.83, V58.61  7/9/2013 - Present Overview Signed 7/9/2013  4:16 PM by Arnulfo Ray NP  
  S/P AVR 
  
  
   
 CAD (coronary artery disease) (Chronic) ICD-10-CM: I25.10 ICD-9-CM: 414.00  1/20/2013 - Present Overview Signed 5/20/2014 10:05 AM by Tammy Vogel NP  
  5/8/14 PCI LAD with stent placed Chronic combined systolic and diastolic heart failure (HCC) (Chronic) ICD-10-CM: I50.42 
ICD-9-CM: 428.42  1/20/2013 - Present Overview Addendum 4/28/2018  4:53 AM by Dang Gutierrez MD  
  5/8/14 ECHO:  EF 10-15% 12/2017:  EF 25-30% Essential hypertension, benign (Chronic) ICD-10-CM: I10 
ICD-9-CM: 401.1  1/20/2013 - Present MDS (myelodysplastic syndrome) (HCC) (Chronic) ICD-10-CM: D46.9 ICD-9-CM: 238.75  12/17/2011 - Present Overview Addendum 8/29/2013 11:06 AM by Ashley Underwood Procrit started in August, 2011 12/18/11 Procrit weekly and Iron stores. 5-12 12-13-12 good response to 3 weekly procrit did not need it last time 3-7-13 Pt doing well. Just wanted a \"check-up. \" Responding to Procrit every three weeks. 8-29-13 patient has missed some injections on recently restarted hemoglobin only issue , takes oral iron iron stores each time Iron deficiency anemia due to chronic blood loss (Chronic) ICD-10-CM: D50.0 ICD-9-CM: 280.0  7/29/2009 - Present RESOLVED: Routine general medical examination at a health care facility ICD-10-CM: Z00.00 ICD-9-CM: V70.0  12/16/2016 - 4/27/2018 Assessment: Profound Physical Debility s/p admitted with moderately severe anemia 
   
Continue daily physician medical management: S/p mech AVR; chronic coumadin therapy but such hi risk of bleeding due to multiple falls.   
- 9/16 continued on coumadin bridged with lovenox. INR still subtherapeutic. INR 1.6. pharmacy dosing. Continue lovenox. CHF FELIZ on CKD thus decreasing Lasix; change to 40 daily 9/15. Monitor response. - no s/s of decompensation clinically. - 9/16 Cr continues to improve slowly. BUN/ Cr = 7/2.10 -> 65/1. 96. Pain Control:  - prn ultram, prn roxicodone. Occasionally. D/c prn morphine inj 
 
   
Hypertension -   On imdur.  
   
Acute on chronic anemia; possible MDS followed by Dr Ellen Staley.  
-  Continue epocrit, ferrous sulfate Pneumonia prophylaxis- Incentive spirometer every hour while awake 
   
Chronic hypoxic respir failure; cont RT txs. On Trilogy at [place] - Currently on 3L per NC.  
  
Insomnia - continueTrazadone prn 
     
Diabetes mellitus -   
-diet controlled. Glycemic control fair. Likely can benefit from an oral hypoglycemic.  Will discuss.  
   
 Urinary retention/ neurogenic bladder - no s/s of retention, on diuretics. -having urinary incont; will try timed voids due to urgency Wound Care: left knee wound. Monitor wound status daily per staff and physician. At risk for failure. - continue to dress daily. Appears without obvious infection/ necrosis. Previously detached skin adheres to wound, viability uncertain. Monitor. bowel program - add bowel program prn.  
- + BM.    
GERD - continue PPI. Previous hx of GIB 
   
Time spent was 25 minutes with over 1/2 in direct patient care/examination, consultation and coordination of care. Signed By: Lorri Gonzalez MD   
 September 16, 2018

## 2018-09-16 NOTE — PROGRESS NOTES
Problem: Falls - Risk of 
Goal: *Absence of Falls Document Kristyn Resendiz Fall Risk and appropriate interventions in the flowsheet. Outcome: Progressing Towards Goal 
Fall Risk Interventions: 
Mobility Interventions: Communicate number of staff needed for ambulation/transfer Medication Interventions: Assess postural VS orthostatic hypotension Elimination Interventions: Call light in reach History of Falls Interventions: Consult care management for discharge planning

## 2018-09-16 NOTE — PROGRESS NOTES
Problem: Falls - Risk of 
Goal: *Absence of Falls Document Damian Sevillas Fall Risk and appropriate interventions in the flowsheet. Outcome: Progressing Towards Goal 
Fall Risk Interventions: 
Mobility Interventions: Communicate number of staff needed for ambulation/transfer, Patient to call before getting OOB, OT consult for ADLs, PT Consult for mobility concerns, PT Consult for assist device competence, Strengthening exercises (ROM-active/passive), Utilize walker, cane, or other assistive device Medication Interventions: Assess postural VS orthostatic hypotension, Evaluate medications/consider consulting pharmacy, Patient to call before getting OOB, Teach patient to arise slowly Elimination Interventions: Call light in reach, Patient to call for help with toileting needs History of Falls Interventions: Consult care management for discharge planning, Evaluate medications/consider consulting pharmacy, Investigate reason for fall

## 2018-09-17 LAB
ANION GAP SERPL CALC-SCNC: 6 MMOL/L (ref 7–16)
BUN SERPL-MCNC: 54 MG/DL (ref 8–23)
CALCIUM SERPL-MCNC: 9.1 MG/DL (ref 8.3–10.4)
CHLORIDE SERPL-SCNC: 101 MMOL/L (ref 98–107)
CO2 SERPL-SCNC: 34 MMOL/L (ref 21–32)
CREAT SERPL-MCNC: 1.69 MG/DL (ref 0.6–1)
GLUCOSE BLD STRIP.AUTO-MCNC: 118 MG/DL (ref 65–100)
GLUCOSE BLD STRIP.AUTO-MCNC: 129 MG/DL (ref 65–100)
GLUCOSE BLD STRIP.AUTO-MCNC: 149 MG/DL (ref 65–100)
GLUCOSE BLD STRIP.AUTO-MCNC: 87 MG/DL (ref 65–100)
GLUCOSE SERPL-MCNC: 79 MG/DL (ref 65–100)
INR PPP: 1.6
POTASSIUM SERPL-SCNC: 4.6 MMOL/L (ref 3.5–5.1)
PROTHROMBIN TIME: 18.2 SEC (ref 11.5–14.5)
SODIUM SERPL-SCNC: 141 MMOL/L (ref 136–145)

## 2018-09-17 PROCEDURE — 36415 COLL VENOUS BLD VENIPUNCTURE: CPT

## 2018-09-17 PROCEDURE — 97535 SELF CARE MNGMENT TRAINING: CPT

## 2018-09-17 PROCEDURE — 74011250637 HC RX REV CODE- 250/637: Performed by: PHYSICAL MEDICINE & REHABILITATION

## 2018-09-17 PROCEDURE — 97110 THERAPEUTIC EXERCISES: CPT

## 2018-09-17 PROCEDURE — 97530 THERAPEUTIC ACTIVITIES: CPT

## 2018-09-17 PROCEDURE — 74011250636 HC RX REV CODE- 250/636: Performed by: PHYSICAL MEDICINE & REHABILITATION

## 2018-09-17 PROCEDURE — 99232 SBSQ HOSP IP/OBS MODERATE 35: CPT | Performed by: PHYSICAL MEDICINE & REHABILITATION

## 2018-09-17 PROCEDURE — 97116 GAIT TRAINING THERAPY: CPT

## 2018-09-17 PROCEDURE — 94640 AIRWAY INHALATION TREATMENT: CPT

## 2018-09-17 PROCEDURE — 80048 BASIC METABOLIC PNL TOTAL CA: CPT

## 2018-09-17 PROCEDURE — 94760 N-INVAS EAR/PLS OXIMETRY 1: CPT

## 2018-09-17 PROCEDURE — 82962 GLUCOSE BLOOD TEST: CPT

## 2018-09-17 PROCEDURE — 65310000000 HC RM PRIVATE REHAB

## 2018-09-17 PROCEDURE — 74011000250 HC RX REV CODE- 250: Performed by: PHYSICAL MEDICINE & REHABILITATION

## 2018-09-17 PROCEDURE — 85610 PROTHROMBIN TIME: CPT

## 2018-09-17 RX ADMIN — TIOTROPIUM BROMIDE 18 MCG: 18 CAPSULE ORAL; RESPIRATORY (INHALATION) at 05:15

## 2018-09-17 RX ADMIN — ALBUTEROL SULFATE 2.5 MG: 2.5 SOLUTION RESPIRATORY (INHALATION) at 17:25

## 2018-09-17 RX ADMIN — ESCITALOPRAM OXALATE 20 MG: 10 TABLET ORAL at 09:16

## 2018-09-17 RX ADMIN — OXYCODONE HYDROCHLORIDE 10 MG: 5 TABLET ORAL at 15:51

## 2018-09-17 RX ADMIN — FUROSEMIDE 40 MG: 40 TABLET ORAL at 09:16

## 2018-09-17 RX ADMIN — SIMVASTATIN 20 MG: 20 TABLET, FILM COATED ORAL at 21:50

## 2018-09-17 RX ADMIN — PANTOPRAZOLE SODIUM 40 MG: 40 TABLET, DELAYED RELEASE ORAL at 05:37

## 2018-09-17 RX ADMIN — ISOSORBIDE MONONITRATE 30 MG: 30 TABLET, EXTENDED RELEASE ORAL at 09:16

## 2018-09-17 RX ADMIN — BUDESONIDE 500 MCG: 0.5 INHALANT RESPIRATORY (INHALATION) at 05:15

## 2018-09-17 RX ADMIN — OXYCODONE HYDROCHLORIDE AND ACETAMINOPHEN 500 MG: 500 TABLET ORAL at 09:16

## 2018-09-17 RX ADMIN — ALBUTEROL SULFATE 2.5 MG: 2.5 SOLUTION RESPIRATORY (INHALATION) at 05:15

## 2018-09-17 RX ADMIN — OXYCODONE HYDROCHLORIDE 10 MG: 5 TABLET ORAL at 21:50

## 2018-09-17 RX ADMIN — TRAZODONE HYDROCHLORIDE 50 MG: 50 TABLET ORAL at 21:50

## 2018-09-17 RX ADMIN — BUDESONIDE 500 MCG: 0.5 INHALANT RESPIRATORY (INHALATION) at 17:25

## 2018-09-17 RX ADMIN — TRAMADOL HYDROCHLORIDE 50 MG: 50 TABLET, FILM COATED ORAL at 09:16

## 2018-09-17 RX ADMIN — WARFARIN SODIUM 5 MG: 5 TABLET ORAL at 17:44

## 2018-09-17 RX ADMIN — ENOXAPARIN SODIUM 100 MG: 100 INJECTION SUBCUTANEOUS at 17:44

## 2018-09-17 RX ADMIN — CARVEDILOL 6.25 MG: 6.25 TABLET, FILM COATED ORAL at 07:51

## 2018-09-17 RX ADMIN — ASPIRIN 81 MG: 81 TABLET, COATED ORAL at 09:16

## 2018-09-17 RX ADMIN — CARVEDILOL 6.25 MG: 6.25 TABLET, FILM COATED ORAL at 16:57

## 2018-09-17 NOTE — PROGRESS NOTES
During IDR, it was noted that pt would discharge on 9/25 but needs are TBD. HEIDI assessed pt at bedside. She lives alone in a one level with three steps at front entrance. She is ADL-independent, doesn't use anything for ambulation, and has a shower chair and Trilogy ([address] ). She confirmed her PCP and insurance as listed and doesn't have any trouble getting her medications. She has three daughters nearby and has a CLTC aide for 16 hours per week (Monday 9-12:30, Tuesday 12:30-4, Thursday 12:30-4, and Friday 12:30-4). She has had [facility] in the past and prefers them again if needed. HEIDI informed her of IDR Thursday and HEIDI will follow up with her afterwards. CM following. Care Management Interventions PCP Verified by CM: Yes Mode of Transport at Discharge: Other (see comment) (Family) Transition of Care Consult (CM Consult): Discharge Planning Physical Therapy Consult: Yes Occupational Therapy Consult: Yes Current Support Network: Own Home, Family Lives [place] Confirm Follow Up Transport: Family Plan discussed with Pt/Family/Caregiver: Yes Freedom of Choice Offered: Yes Discharge Location Discharge Placement: Unable to determine at this time

## 2018-09-17 NOTE — PROGRESS NOTES
Problem: Falls - Risk of 
Goal: *Absence of Falls Document Fabiola Ornelas Fall Risk and appropriate interventions in the flowsheet. Outcome: Progressing Towards Goal 
Fall Risk Interventions: 
Mobility Interventions: Communicate number of staff needed for ambulation/transfer, OT consult for ADLs, Patient to call before getting OOB, PT Consult for mobility concerns, PT Consult for assist device competence, Strengthening exercises (ROM-active/passive), Utilize walker, cane, or other assistive device Medication Interventions: Evaluate medications/consider consulting pharmacy, Patient to call before getting OOB, Teach patient to arise slowly Elimination Interventions: Call light in reach, Patient to call for help with toileting needs History of Falls Interventions: Evaluate medications/consider consulting pharmacy

## 2018-09-17 NOTE — PROGRESS NOTES
End Of Shift Functional Summary, Nursing TOILETING:   
Does patient need assist with adjusting pants up or down and/or pericare? no If yes, please indicate what the patient needs help with:    (i.e. pants up, pants down, pericare) Pt uses . Does the patient require extra time? yes Does the patient require standby assistance? yes Does the patient require contact guard assistance? no 
Does the patient require more than one staff member for assistance? no 
 
TOILET TRANSFER:   
Pt requires minimal assistance. Pt uses . Does the patient require extra time? yes Does the patient require contact guard assistance? no 
Does the patient require more than one staff member for assistance? no 
 
BLADDER:   
Pt does not have a medina catheter that staff manages. Pt does not take medication. If so, please indicate which medication:   
Pt is continent. of bladder and voids in toilet  Pt requires staff to empty device   Pt has had 0 bladder accidents during this shift.  (An accident is when the episode is not contained in a brief AND/OR the clothing/linen requires changing/cleaning up.) BED/CHAIR TRANSFER Pt requires standby assistance/setup. Pt uses Does the patient require extra time? yes Does the patient require contact guard assistance? no 
Does the patient require more than one staff member for assistance? no 
 
 
Documentation reviewed and plan of care discussed/reviewed with  
patient and oncoming nurse during the shift.

## 2018-09-17 NOTE — PROGRESS NOTES
PHYSICAL THERAPY DAILY NOTE Time In: 1101 Time Out: 5294 Patient Seen For: AM;Balance activities;Gait training;Patient education; Therapeutic exercise;Transfer training; Other (see progress notes) Subjective: Patient reporting she would like to go home this week instead of next week. Reports she would like to have a rollator at home. Objective:Vital Signs: 
Patient Vitals for the past 12 hrs: 
 Temp Pulse Resp BP SpO2  
09/17/18 0730 98.9 °F (37.2 °C) 68 18 137/72 96 % 09/17/18 0515 - - - - 97 % Patient on 02 at 3 lpm. Resting 02 sat 95 to 96% and HR 63, 02 sat 93% and HR 70 after ambulating 170 ft 
 
Pain level:No c/o pain during treatment Pain location:NA Pain interventions:NA Patient education:Balance training,transfer training, gait training, fall precautions, activity pacing, body mechanics, rollator parts management, breathing pattern training, Patient verbalizing understanding and demonstrating partial understanding of patient education. Recommend follow up education. Interdisciplinary Communication:Spoke with OT regarding progress towards goals Other (comment) (Low Vision, Fall Risk) GROSS ASSESSMENT Daily Assessment NA  
 
 
BED/MAT MOBILITY Daily Assessment Rolling Right : 0 (Not tested) Rolling Left : 0 (Not tested) Supine to Sit : 0 (Not tested) Sit to Supine : 0 (Not tested) TRANSFERS Daily Assessment Increased time and effort to complete with cues for body mechanics and rollator set up Transfer Type: SPT with walker Transfer Assistance : 5 (Supervision/setup) Sit to Stand Assistance: Supervision Car Transfers: Not tested GAIT Daily Assessment 7 min sitting rest break between attempts Ambulating 5 ft in room with no device and supervision with no loss of balance Amount of Assistance: 5 (Supervision/setup) Distance (ft): 170 Feet (ft) (170ft x 1  100ft x 1) Assistive Device: Walker, rollator Gait training with cues for posture correction and cues to control breathing pattern with gait speed STEPS or STAIRS Daily Assessment Steps/Stairs Ambulated (#): 0 Level of Assist : 0 (Not tested) BALANCE Daily Assessment Static standing balance activities in room without UE support and supervision to SBA with no loss of balance. Sitting - Static: Good (unsupported) Sitting - Dynamic: Good (unsupported) Standing - Static: Good (with rollator) Standing - Dynamic : Impaired WHEELCHAIR MOBILITY Daily Assessment Curbs/Ramps Assist Required (FIM Score): 0 (Not tested) Wheelchair Setup Assist Required : 0 (Not tested) LOWER EXTREMITY EXERCISES Daily Assessment Increased time and effort to complete with multiple and frequent rest breaks. Cues for breathing pattern and correct form Extremity: Both Exercise Type #1: Seated lower extremity strengthening Sets Performed: 2 Reps Performed: 10 Level of Assist: Minimal assistance SEATED EXERCISES Sets Reps Comments Ankle Pumps 1 20 Hip Flexion 2 10 [address] 2 10 Hip Adduction/Ball Squeeze 2 10 Hip Abduction with green Theraband 2 10 Hamstring Curls with green Theraband 2 10 Hip Extension with green Theraband 2 10 Assessment: Progressing towards modified independence with transfers and gait with rollator. Increased time spent with breathing pattern control during exercise and gait in order to keep 02 sat greater than 90% during both activities Patient returned to room at end of treatment and remained up in recliner with LEs elevated and with needs in reach. 02 at 3 lpm 
 
Plan of Care: Continue with POC and progress as tolerated. Surekha Frost, PT 
9/17/2018

## 2018-09-17 NOTE — PROGRESS NOTES
Warfarin dosing per pharmacist 
 
Jose Antonio Clark Camila Salguero is a 77 y.o. female. Indication:  S/p AVR Goal INR:  2-3 Home dose:  5 mg Sun and 2.5 mg all other days  (Weekly dose of 20 mg) Risk factors or significant drug interactions:  none Other anticoagulants:  Lovenox bridge Daily Monitoring Date  INR     Warfarin dose  HGB              Notes 9/8  3.4  Hold   9.3 
9/9  2.8  Hold   9.0  
9/10  2.0  Hold   9.6 9/11  1.5  5 mg    9.5 9/12  1.3  5 mg   10.5 9/13  1.3  5 mg   --- 
9/14  1.3  (15 mg)*  ---  *patient mistakenly given 7.5 mg dose X 2, [place] End entered 9/15  1.4  Hold   --- 
9/16  1.6  5 mg   --- 
9/17  1.6  5 mg   --- Pharmacy has been consulted to dose warfarin for this patient due to a history of AVR (mechanical). The INR goal per the patient's home warfarin clinic is 2-3. Patient initially presented with a supratherapeutic INR that has trended down. INR 1.6 again today. Will continue 5 mg currently. Expect to see effects of 15 mg dose in the next couple of days. Following closely with daily INR. Okay to continue lovenox bridge therapy until INR therapeutic. Thank you, Jeff Davis, Pharm. D. Clinical Pharmacist 
[phone]

## 2018-09-17 NOTE — PROGRESS NOTES
Corinne Cifuentes MD, Medical Director Marcel Schwartz [address], [address] [facility] Tel: [phone] SFD PROGRESS NOTE [address] Admit Date: 9/11/2018 Admit Diagnosis: debility; Physical debility Subjective Doing well this morning. Had great rest time yesterday . Ready to start her week. No cp, sob, n/v 
 
Objective:  
 
Current Facility-Administered Medications Medication Dose Route Frequency  warfarin (COUMADIN) tablet 5 mg  5 mg Oral QPM  
 furosemide (LASIX) tablet 40 mg  40 mg Oral DAILY  traZODone (DESYREL) tablet 25-50 mg  25-50 mg Oral QHS  
 0.9% sodium chloride infusion 250 mL  250 mL IntraVENous PRN  
 acetaminophen (TYLENOL) tablet 650 mg  650 mg Oral Q6H PRN  
 sodium chloride (NS) flush 5-10 mL  5-10 mL IntraVENous PRN  
 albuterol (PROVENTIL VENTOLIN) nebulizer solution 2.5 mg  2.5 mg Nebulization Q4H PRN  
 alum-mag hydroxide-simeth (MYLANTA) oral suspension 30 mL  30 mL Oral Q4H PRN  
 ascorbic acid (vitamin C) (VITAMIN C) tablet 500 mg  500 mg Oral DAILY  aspirin delayed-release tablet 81 mg  81 mg Oral DAILY  bisacodyl (DULCOLAX) suppository 10 mg  10 mg Rectal DAILY PRN  
 budesonide (PULMICORT) 500 mcg/2 ml nebulizer suspension  500 mcg Nebulization BID RT And  
 albuterol (PROVENTIL VENTOLIN) nebulizer solution 2.5 mg  2.5 mg Nebulization Q6HWA RT  
 carvedilol (COREG) tablet 6.25 mg  6.25 mg Oral BID WITH MEALS  enoxaparin (LOVENOX) injection 100 mg  100 mg SubCUTAneous Q24H  
 escitalopram oxalate (LEXAPRO) tablet 20 mg  20 mg Oral DAILY  influenza vaccine 2018-19 (6 mos+)(PF) (FLUARIX QUAD/FLULAVAL QUAD) injection 0.5 mL  0.5 mL IntraMUSCular PRIOR TO DISCHARGE  insulin lispro (HUMALOG) injection   SubCUTAneous AC&HS  isosorbide mononitrate ER (IMDUR) tablet 30 mg  30 mg Oral DAILY  ondansetron (ZOFRAN ODT) tablet 4 mg  4 mg Oral Q6H PRN  
  oxyCODONE IR (ROXICODONE) tablet 5-10 mg  5-10 mg Oral Q4H PRN  pantoprazole (PROTONIX) tablet 40 mg  40 mg Oral ACB  polyethylene glycol (MIRALAX) packet 17 g  17 g Oral DAILY PRN  
 simvastatin (ZOCOR) tablet 20 mg  20 mg Oral QHS  tiotropium (SPIRIVA) inhalation capsule 18 mcg  18 mcg Inhalation DAILY  traMADol (ULTRAM) tablet 50 mg  50 mg Oral Q4H PRN Review of Systems:Denies chest pain, shortness of breath, cough, headache,abdominal pain, dysurea, calf pain. Pertinent positives are as noted in the medical records and unremarkable otherwise. + chronic visual problems; can no longer see well enough to read which bothers her Visit Vitals  /72  Pulse 68  Temp 98.9 °F (37.2 °C)  Resp 18  SpO2 96% Physical Exam:  
General: Alert and age appropriately oriented. No acute cardio respiratory distress. HEENT: Normocephalic,no scleral icterus Oral mucosa moist without cyanosis Lungs: Clear to auscultation  bilaterally. Respiration even and unlabored Heart: Regular rate and rhythm, S1, S2 No  murmurs, clicks, rub or gallops Abdomen: Soft, non-tender, nondistended. Bowel sounds present. No organomegaly. obese Genitourinary: defer Neuromuscular:  
 
 Generalized prox> distal weakness, non focal 
AAOx4 , sensation intact Skin/extremity: No rashes, no erythema. No calf tenderness BLE Wound ; left knee, dressing with serosang drainage; blister peeling, wound purple. No s/s of infxn Functional Assessment: 
   
   
Balance Sitting - Static: Good (unsupported) (09/15/18 1400) Sitting - Dynamic: Good (unsupported) (09/15/18 1400) Standing - Static: Fair (09/15/18 1400) Standing - Dynamic : Impaired (09/15/18 1400) Kristyn Resendiz Fall Risk Assessment: 
Odette Reynoso Risk Mobility: Ambulates or transfers with assist devices or assistance (09/16/18 2025) Mobility Interventions: Communicate number of staff needed for ambulation/transfer;OT consult for ADLs; Patient to call before getting OOB;PT Consult for mobility concerns;PT Consult for assist device competence;Strengthening exercises (ROM-active/passive); Utilize walker, cane, or other assistive device (09/16/18 2025) Mentation: Alert, oriented x 3 (09/16/18 2025) Medication: Patient receiving anticonvulsants, sedatives(tranquilizers), psychotropics or hypnotics, hypoglycemics, narcotics, sleep aids, antihypertensives, laxatives, or diuretics (09/16/18 2025) Medication Interventions: Evaluate medications/consider consulting pharmacy; Patient to call before getting OOB; Teach patient to arise slowly (09/16/18 2025) Elimination: Needs assistance with toileting (09/16/18 2025) Elimination Interventions: Call light in reach; Patient to call for help with toileting needs (09/16/18 2025) Prior Fall History: Before admission in past 12 months _home or previous inpatient care) (09/16/18 2025) History of Falls Interventions: Evaluate medications/consider consulting pharmacy (09/16/18 2025) Total Score: 4 (09/16/18 2025) Standard Fall Precautions: Yes (09/16/18 2025) High Fall Risk: Yes (09/16/18 2025) Speech Assessment: 
    
 
Ambulation: 
Gait Distance (ft): 150 Feet (ft) (09/15/18 1400) Assistive Device: Walker, rollator (09/15/18 1400) Rail Use: Right  (09/12/18 1600) Labs/Studies: 
Recent Results (from the past 72 hour(s)) GLUCOSE, POC Collection Time: 09/14/18 11:34 AM  
Result Value Ref Range Glucose (POC) 138 (H) 65 - 100 mg/dL GLUCOSE, POC Collection Time: 09/14/18  4:35 PM  
Result Value Ref Range Glucose (POC) 157 (H) 65 - 100 mg/dL GLUCOSE, POC Collection Time: 09/14/18  8:18 PM  
Result Value Ref Range Glucose (POC) 197 (H) 65 - 100 mg/dL PROTHROMBIN TIME + INR Collection Time: 09/15/18  6:24 AM  
Result Value Ref Range Prothrombin time 16.3 (H) 11.5 - 14.5 sec INR 1.4 METABOLIC PANEL, BASIC Collection Time: 09/15/18  6:24 AM  
Result Value Ref Range Sodium 142 136 - 145 mmol/L Potassium 4.1 3.5 - 5.1 mmol/L Chloride 100 98 - 107 mmol/L  
 CO2 34 (H) 21 - 32 mmol/L Anion gap 8 7 - 16 mmol/L Glucose 88 65 - 100 mg/dL BUN 77 (H) 8 - 23 MG/DL Creatinine 2.10 (H) 0.6 - 1.0 MG/DL  
 GFR est AA 30 (L) >60 ml/min/1.73m2 GFR est non-AA 25 (L) >60 ml/min/1.73m2 Calcium 9.5 8.3 - 10.4 MG/DL  
GLUCOSE, POC Collection Time: 09/15/18  7:48 AM  
Result Value Ref Range Glucose (POC) 99 65 - 100 mg/dL GLUCOSE, POC Collection Time: 09/15/18 11:04 AM  
Result Value Ref Range Glucose (POC) 158 (H) 65 - 100 mg/dL GLUCOSE, POC Collection Time: 09/15/18  4:27 PM  
Result Value Ref Range Glucose (POC) 162 (H) 65 - 100 mg/dL GLUCOSE, POC Collection Time: 09/15/18  8:38 PM  
Result Value Ref Range Glucose (POC) 182 (H) 65 - 100 mg/dL PROTHROMBIN TIME + INR Collection Time: 09/16/18  5:49 AM  
Result Value Ref Range Prothrombin time 18.3 (H) 11.5 - 14.5 sec INR 1.6 METABOLIC PANEL, BASIC Collection Time: 09/16/18  5:49 AM  
Result Value Ref Range Sodium 144 136 - 145 mmol/L Potassium 4.7 3.5 - 5.1 mmol/L Chloride 102 98 - 107 mmol/L  
 CO2 32 21 - 32 mmol/L Anion gap 10 7 - 16 mmol/L Glucose 86 65 - 100 mg/dL BUN 65 (H) 8 - 23 MG/DL Creatinine 1.96 (H) 0.6 - 1.0 MG/DL  
 GFR est AA 32 (L) >60 ml/min/1.73m2 GFR est non-AA 27 (L) >60 ml/min/1.73m2 Calcium 9.3 8.3 - 10.4 MG/DL  
GLUCOSE, POC Collection Time: 09/16/18  7:33 AM  
Result Value Ref Range Glucose (POC) 126 (H) 65 - 100 mg/dL GLUCOSE, POC Collection Time: 09/16/18 11:16 AM  
Result Value Ref Range Glucose (POC) 156 (H) 65 - 100 mg/dL GLUCOSE, POC Collection Time: 09/16/18  4:52 PM  
Result Value Ref Range Glucose (POC) 128 (H) 65 - 100 mg/dL GLUCOSE, POC  Collection Time: 09/16/18  8:58 PM  
 Result Value Ref Range Glucose (POC) 210 (H) 65 - 100 mg/dL PROTHROMBIN TIME + INR Collection Time: 09/17/18  5:39 AM  
Result Value Ref Range Prothrombin time 18.2 (H) 11.5 - 14.5 sec INR 1.6 METABOLIC PANEL, BASIC Collection Time: 09/17/18  5:39 AM  
Result Value Ref Range Sodium 141 136 - 145 mmol/L Potassium 4.6 3.5 - 5.1 mmol/L Chloride 101 98 - 107 mmol/L  
 CO2 34 (H) 21 - 32 mmol/L Anion gap 6 (L) 7 - 16 mmol/L Glucose 79 65 - 100 mg/dL BUN 54 (H) 8 - 23 MG/DL Creatinine 1.69 (H) 0.6 - 1.0 MG/DL  
 GFR est AA 38 (L) >60 ml/min/1.73m2 GFR est non-AA 32 (L) >60 ml/min/1.73m2 Calcium 9.1 8.3 - 10.4 MG/DL  
GLUCOSE, POC Collection Time: 09/17/18  7:13 AM  
Result Value Ref Range Glucose (POC) 87 65 - 100 mg/dL Assessment:  
 
Problem List as of 9/17/2018  Date Reviewed: 8/31/2018 Codes Class Noted - Resolved Coagulopathy ([facility] INR >4 on admission 9/7/18 ICD-10-CM: D68.9 ICD-9-CM: 286.9  9/8/2018 - Present Traumatic hematoma of left knee ICD-10-CM: H71.24YL ICD-9-CM: 924.11  9/8/2018 - Present Debility ICD-10-CM: R53.81 ICD-9-CM: 799.3  9/8/2018 - Present Anemia ICD-10-CM: D64.9 ICD-9-CM: 285.9  9/7/2018 - Present Chronic respiratory failure with hypoxia (HCC) (Chronic) ICD-10-CM: J96.11 
ICD-9-CM: 518.83, 799.02  9/7/2018 - Present Acute kidney injury superimposed on chronic kidney disease ([email or web address] ICD-10-CM: N17.9, N18.9 ICD-9-CM: 866.00, 585.9  9/7/2018 - Present Encounter for immunization ICD-10-CM: N07 ICD-9-CM: V03.89  8/21/2018 - Present Obesity, morbid ([facility] ICD-10-CM: E66.01 
ICD-9-CM: 278.01  6/8/2018 - Present * (Principal)Physical debility ICD-10-CM: R53.81 ICD-9-CM: 799.3  5/2/2018 - Present Closed nondisplaced fracture of shaft of fifth metacarpal bone of left hand ICD-10-CM: K20.407M ICD-9-CM: 815.03  4/28/2018 - Present Subdural hematoma (HCC) ICD-10-CM: I62.00 ICD-9-CM: 432.1  4/27/2018 - Present Long term (current) use of anticoagulants (Chronic) ICD-10-CM: Z79.01 
ICD-9-CM: V58.61  4/19/2018 - Present Dysthymia ICD-10-CM: F34.1 ICD-9-CM: 300.4  4/5/2018 - Present Type 2 diabetes mellitus with nephropathy (HCC) (Chronic) ICD-10-CM: E11.21 
ICD-9-CM: 250.40, 583.81  12/18/2017 - Present Pulmonary hypertension (HCC) (Chronic) ICD-10-CM: I27.20 ICD-9-CM: 416.8  6/15/2016 - Present S/P AVR (aortic valve replacement) (Chronic) ICD-10-CM: Z95.2 ICD-9-CM: V43.3  Unknown - Present Overview Signed 2/23/2016 11:04 AM by John Saleem Mechanical/Artific Cardiomyopathy ([facility] (Chronic) ICD-10-CM: I42.9 ICD-9-CM: 425.4  Unknown - Present Osteopenia ICD-10-CM: M85.80 ICD-9-CM: 733.90  Unknown - Present HLD (hyperlipidemia) (Chronic) ICD-10-CM: W86.2 ICD-9-CM: 272.4  Unknown - Present Osteoarthritis ICD-10-CM: M19.90 ICD-9-CM: 715.90  Unknown - Present  
   
 ICD (implantable cardioverter-defibrillator) in place ICD-10-CM: Z95.810 ICD-9-CM: V45.02  10/2/2014 - Present Overview Signed 10/2/2014  4:58 PM by Albert Bertrand III [employer] single-chamber ICD implantation 10/20/14 Diabetes mellitus type 2, diet-controlled (HCC) (Chronic) ICD-10-CM: E11.9 ICD-9-CM: 250.00  8/28/2014 - Present COPD (chronic obstructive pulmonary disease) (HCC) (Chronic) ICD-10-CM: J44.9 ICD-9-CM: 899  4/2/2014 - Present REJI (obstructive sleep apnea)-cpap (Chronic) ICD-10-CM: G47.33 
ICD-9-CM: 327.23  4/2/2014 - Present CKD (chronic kidney disease) stage 3, GFR 30-59 ml/min (Chronic) ICD-10-CM: N18.3 ICD-9-CM: 585.3  7/10/2013 - Present Anticoagulated on Coumadin (Chronic) ICD-10-CM: Z51.81, Z79.01 
ICD-9-CM: V58.83, V58.61  7/9/2013 - Present  Overview Signed 7/9/2013  4:16 PM by Godwin Molina NP  
  S/P AVR 
  
  
   
 CAD (coronary artery disease) (Chronic) ICD-10-CM: I25.10 ICD-9-CM: 414.00  1/20/2013 - Present Overview Signed 5/20/2014 10:05 AM by Carrington Oliva NP  
  5/8/14 PCI LAD with stent placed Chronic combined systolic and diastolic heart failure (HCC) (Chronic) ICD-10-CM: I50.42 
ICD-9-CM: 428.42  1/20/2013 - Present Overview Addendum 4/28/2018  4:53 AM by Sidney Perez MD  
  5/8/14 ECHO:  EF 10-15% 12/2017:  EF 25-30% Essential hypertension, benign (Chronic) ICD-10-CM: I10 
ICD-9-CM: 401.1  1/20/2013 - Present MDS (myelodysplastic syndrome) (HCC) (Chronic) ICD-10-CM: D46.9 ICD-9-CM: 238.75  12/17/2011 - Present Overview Addendum 8/29/2013 11:06 AM by Ashly Hoover Procrit started in August, 2011 12/18/11 Procrit weekly and Iron stores. 5-12 12-13-12 good response to 3 weekly procrit did not need it last time 3-7-13 Pt doing well. Just wanted a \"check-up. \" Responding to Procrit every three weeks. 8-29-13 patient has missed some injections on recently restarted hemoglobin only issue , takes oral iron iron stores each time Iron deficiency anemia due to chronic blood loss (Chronic) ICD-10-CM: D50.0 ICD-9-CM: 280.0  7/29/2009 - Present RESOLVED: Routine general medical examination at a health care facility ICD-10-CM: Z00.00 ICD-9-CM: V70.0  12/16/2016 - 4/27/2018 Assessment: Profound Physical Debility s/p admitted with moderately severe anemia 
   
Continue daily physician medical management: 
Pneumonia prophylaxis- Incentive spirometer every hour while awake 
   
Chronic hypoxic respir failure; cont RT txs. On Trilogy at [facility]. Monitor O2 sats and cont O2 supplementation.  Currently on 3L per NC 
   
Insomnia; add Trazadone, or dtg can bring in her Melatonin 
   
DVT risk / DVT Prophylaxis- Will require daily physician exam to assess for signs and symptoms as patient is at increased risk for of thromboembolism. Mobilization as tolerated. Intermittent pneumatic compression devices when in bed Thigh-high or knee-high thromboembolic deterrent hose when out of bed. No further anticoag is needed; on therapeutic Lovenox while bridging to coumadin 
-9/12 inr down 1.3 ;pharmacy dosing; 9/13 not changed ; now on 5mg coumadin; cont lovenox until therapeutic 
-9/14 INR still subtherap at 1.3 , thus continue sq Lovenox; pharmacy dosing; 9/17 INR 1.6; pharm dosing; still on 5mg, likely need to increase; would like INR 2.5 to 3 
   
S/p mech AVR; chronic coumadin therapy but such hi risk of bleeding due to multiple falls.   
   
CHF; cont aldactone, coreg, imdur and lasix; 9/13 pt was on Lasix 80mg bid during acute stay, have dec to 40 mg bid; states she takes 20mg at home; monitor closely for signs of chf exacerb.  
-9/14 compensated; monitor as Lasix has decreased to 40 bid, home dose 20 daily; FELIZ on CKD thus decreasing Lasix; change to 40 daily  
-9/17 no peripheral edema, no s/s of CHF, kidney fxn improving; cont 40mg for now of Lasix 
   
Pain Control: ongoing significant pain in knees which is stable and controlled by PRN meds. Will require regular pain assessment and comprenhensive pain management,  
   
Wound Care: Monitor wound status daily per staff and physician. At risk for failure. Will require 24/7 rehab nursing. Keep wound clean and dry, Reinforce dressing PRN and Ice to area for comfort 
   
Hypertension - BP controlled, fluctuating, managed medically.  
   
Acute on chronic anemia; possible MDS followed by Dr Nat Myers. Monitor closely, sp transfusion. Cont epocrit. Cont ferrous sulfate and vit C 
   
Depression; cont Lexapro. Depression regarding medical cond and fear of losing independence 
   
Diabetes mellitus - controlled. poor glycemic control. Will require daily, close FSG monitoring and medication adjustment to optimize glycemic control in setting of acute illness and hospitalization. -diet controlled so far 
   
Urinary retention/ neurogenic bladder - schedule voids q6-8 hrs. Check post-void residual as needed; In and Out catheterize if post-void residual is more than 400 cc. 
-having urinary incont; will try timed voids due to urgency 
   
CKD stg 3; creat 2.05 on admission to rehab. Was 1.55 yesterday. Recheck in a.m and review meds contributing. 
-9/12 creat is increasing; dec lasix; recheck pending 9/13. 2.48 Today 9/14 creat 2.35, BUN 74; slowly improving with drop in Lasix/CHF well compensated; DAILY BMP 
-creatinine continues to improve with decreased Lasix; no s/s of chf, no peripheral edema and lungs clear; creat 1.69 from 1.96 yest. BUN down to 54 from 65 yesterday. Close to baseline 
   
bowel program - add bowel program prn 
   
GERD - add PPI. At times may need additional antacids, Maalox prn. Previous hx of GIB;HH stable 
   
  
 
Time spent was 25 minutes with over 1/2 in direct patient care/examination, consultation and coordination of care. Signed By: Andrew Guillen MD   
 September 17, 2018

## 2018-09-17 NOTE — PROGRESS NOTES
OT Daily Note Time In 1347 Time Out 1430 Subjective: \"Honey, I hurt all over all the time. \"  
Pain: generalized; declined pain intervention despite offers to notify nurse Education: benefits of rehab, transfer training Interdisciplinary Communication: with PT regarding schedule Precautions:  (low vision, fall risk) Location on arrival: up in recliner Transfers Daily Assessment Patient transferred recliner to [facility] using SPT without AE with SBA. Visual-Perceptual Daily Assessment Patient attempted visual perceptual task, Pattern Play, but was unable to see the pieces to complete. Patient completed 20 piece puzzle with moderate assistance to identify pieces and complete. Low vision is a barrier to participation with table-top tasks. PROM Daily Assessment Attempted to assess PROM of R shoulder, but patient declined to allow therapist to complete PROM assessment. Patient declined finger ladder or pulley system for PROM RUE. Limited RUE AROM and PROM; further assessment/treatment may be warranted as patient is willing/able to tolerate. Patient was left seated up in [facility] in gym for PT. Assessment: Progressing. Vision and decreased AROM/PROM RUE are barriers. Plan: Continue OT POC with focus on ADL/IADL skills, functional transfers, functional mobility, coordination, strength, static and dynamic balance, and activity tolerance to maximize safety and independence with ADLs and functional transfers. Michelle Renteria MS, OTR/L 
9/17/2018

## 2018-09-17 NOTE — PROGRESS NOTES
PHYSICAL THERAPY DAILY NOTE Time In: 7360 Time Out: 9101 Patient Seen For: PM;Gait training;Patient education; Therapeutic exercise;Transfer training; Other (see progress notes) Subjective: patient reporting she is tired from extra therapy today. Reports she will try going up/down steps Objective:Vital Signs:patient on 02 at 3 lpm, 02 sat 89% and HR 74 after ambulating 150ft. 02 sat 95% at end of treatment Patient Vitals for the past 12 hrs: 
 Temp Pulse Resp BP SpO2  
09/17/18 0730 98.9 °F (37.2 °C) 68 18 137/72 96 % 09/17/18 0515 - - - - 97 % Pain level:No c/o pain during treatment Pain location:NA Pain interventions:NA Patient education:energy conservation,transfer training, gait training, fall precautions, activity pacing, body mechanics, rollator parts management, breathing techniques during functional mobility. Patient verbalizing understanding and demonstrating  understanding of patient education. Recommend follow up education. Interdisciplinary Communication:NA Other (comment) (Low Vision, Fall Risk) GROSS ASSESSMENT Daily Assessment NA  
 
 
BED/MAT MOBILITY Daily Assessment Rolling Right : 0 (Not tested) Rolling Left : 0 (Not tested) Supine to Sit : 0 (Not tested) Sit to Supine : 0 (Not tested) TRANSFERS Daily Assessment Increased time and effort to complete with cues for body mechanics and rollator parts management Transfer Type: SPT with walker Transfer Assistance : 5 (Supervision/setup) Sit to Stand Assistance: Supervision Car Transfers: Not tested GAIT Daily Assessment Amount of Assistance: 5 (Supervision/setup) Distance (ft): 150 Feet (ft) Assistive Device: Walker, rolling Gait training with cues for controlling gait speed with breathing pattern Gait training up/down 10 ft ramp with rollator and SBA with cues STEPS or STAIRS Daily Assessment Slow single step at a time going up/down steps with 3 min sitting rest break after going up steps. Steps/Stairs Ambulated (#): 4 Level of Assist : 5 (Stand-by assistance) Rail Use: Left BALANCE Daily Assessment Sitting - Static: Good (unsupported) Sitting - Dynamic: Good (unsupported) Standing - Static: Good (with rollator) Standing - Dynamic : Impaired WHEELCHAIR MOBILITY Daily Assessment Curbs/Ramps Assist Required (FIM Score): 0 (Not tested) Wheelchair Setup Assist Required : 0 (Not tested) LOWER EXTREMITY EXERCISES Daily Assessment Extremity: Both Exercise Type #1: Other (comment) (LE motomed x 10 mins at level 2) Level of Assist: Supervision Assessment: Functional endurance improving Patient returned to room at end of treatment and remained up in recliner with LEs elevated and with needs in reach. 02 at 3 lpm. 
 
Plan of Care: Continue with POC and progress as tolerated. Yuan Ventura, PT 
9/17/2018

## 2018-09-17 NOTE — PROGRESS NOTES
09/17/18 5430 Grooming Functional Level 5 Tasks Completed by Patient Brushed hair Comments Setup seated EOB Bathing Functional Level 5 Body Parts Patient Bathed Madi area; Thigh, left; Thigh, right; Lower leg and foot, right; Lower leg and foot, left; Chest;Arm, right;Buttocks;Arm, left; Abdomen Comments Supervision for standing balance at sink as patient bathes madi area, increased time and effort utilizing figure 4 position to bathe B feet Upper Body Dressing/Undressing Functional Level 4 Items Applied/Steps Completed Pullover (4 steps) Comments Assist to stabilize as patient threads RUE through sleeve Lower Body Dressing/Undressing Functional Level 5 Items Applied/Steps Completed Underpants (3 steps); Elastic waist pants (3 steps); Shoe, left (1 step); Shoe, right (1 step) Comments Supervision for standing balance EOB to manage pants over hips, increased time and effort to don slip on slippers Toileting Functional Level 5 Comments Supervision for standing balance, use of grab bar Bed/Chair/Wheelchair Transfers Sit to Stand Assistance Supervision Toilet Transfer [place] Toilet Transfer Score 5 Comments SPT wheelchair <> toilet using grab bars S: \"Help me [put on my pants]. \" [patient required encouragement and re-education for purpose/goals of OT, patient verbalized understanding and ultimately donned pants without physical assist] Agreeable to therapy. Focus of session was on ADL and functional mobility, followed by BUE ROM/strengthening in therapy gym. Patient was able to ambulate ~10 feet using x 2 without AD, supervision-CGA. Pain in L knee not rated, RN Provided medications. Patient completed 1 set x 10 reps of RUE AAROM pendulum exercises standing at tabletop stabilizing through LUE with supervision.   Patient educated regarding technique/benefits of pendulum exercises for gentle AAROM to R shoulder however continues to report increased pain in R shoulder with pendulum exercises and unable to tolerate further trials. Patient completed standing trial at elevated tabletop for HELADIO HUBBARD towel slides maintaining balance with supervision to trace large capital letters of alphabet on tabletop while maintaining gross grasp on washcloth in L hand. Patient required cue to correct grasp as digits 3-5 move into flexion/ulnar drift when exerting increased effort. Collaborated with PT, Jennifer Obrien and confirmed patient is on track to reach goals as documented in the care plan. Patient tolerated session well, but activity tolerance, balance, functional mobility, strength, coordination, cognition are still below baseline and require skilled facilitation to successfully and safely complete ADL's and transfers. Patient ended session in recliner with call remote and phone within reach.   
 
Zen Handy, OTR/L

## 2018-09-17 NOTE — PROGRESS NOTES
Pt is A/O x 4 c/o pain to left knee, pt ask for tramadol 50 mg for pain. Dressing to left knee has old drainage. Pt ate 100 % of her breakfast and is working with therapy. Pt encourage to call for any needs.

## 2018-09-18 LAB
GLUCOSE BLD STRIP.AUTO-MCNC: 132 MG/DL (ref 65–100)
GLUCOSE BLD STRIP.AUTO-MCNC: 150 MG/DL (ref 65–100)
GLUCOSE BLD STRIP.AUTO-MCNC: 164 MG/DL (ref 65–100)
GLUCOSE BLD STRIP.AUTO-MCNC: 97 MG/DL (ref 65–100)
INR PPP: 1.7
PROTHROMBIN TIME: 19.4 SEC (ref 11.5–14.5)

## 2018-09-18 PROCEDURE — 74011250637 HC RX REV CODE- 250/637: Performed by: PHYSICAL MEDICINE & REHABILITATION

## 2018-09-18 PROCEDURE — 94640 AIRWAY INHALATION TREATMENT: CPT

## 2018-09-18 PROCEDURE — 94760 N-INVAS EAR/PLS OXIMETRY 1: CPT

## 2018-09-18 PROCEDURE — 74011000250 HC RX REV CODE- 250: Performed by: PHYSICAL MEDICINE & REHABILITATION

## 2018-09-18 PROCEDURE — 97530 THERAPEUTIC ACTIVITIES: CPT

## 2018-09-18 PROCEDURE — 97110 THERAPEUTIC EXERCISES: CPT

## 2018-09-18 PROCEDURE — 97116 GAIT TRAINING THERAPY: CPT

## 2018-09-18 PROCEDURE — 65310000000 HC RM PRIVATE REHAB

## 2018-09-18 PROCEDURE — 85610 PROTHROMBIN TIME: CPT

## 2018-09-18 PROCEDURE — 82962 GLUCOSE BLOOD TEST: CPT

## 2018-09-18 PROCEDURE — 97535 SELF CARE MNGMENT TRAINING: CPT

## 2018-09-18 PROCEDURE — 74011636637 HC RX REV CODE- 636/637: Performed by: PHYSICAL MEDICINE & REHABILITATION

## 2018-09-18 PROCEDURE — 99232 SBSQ HOSP IP/OBS MODERATE 35: CPT | Performed by: PHYSICAL MEDICINE & REHABILITATION

## 2018-09-18 PROCEDURE — 74011250636 HC RX REV CODE- 250/636: Performed by: PHYSICAL MEDICINE & REHABILITATION

## 2018-09-18 PROCEDURE — 36415 COLL VENOUS BLD VENIPUNCTURE: CPT

## 2018-09-18 PROCEDURE — 77010033678 HC OXYGEN DAILY

## 2018-09-18 RX ADMIN — TRAZODONE HYDROCHLORIDE 50 MG: 50 TABLET ORAL at 23:45

## 2018-09-18 RX ADMIN — OXYCODONE HYDROCHLORIDE AND ACETAMINOPHEN 500 MG: 500 TABLET ORAL at 08:10

## 2018-09-18 RX ADMIN — ESCITALOPRAM OXALATE 20 MG: 10 TABLET ORAL at 08:10

## 2018-09-18 RX ADMIN — INSULIN LISPRO 2 UNITS: 100 INJECTION, SOLUTION INTRAVENOUS; SUBCUTANEOUS at 21:28

## 2018-09-18 RX ADMIN — ALBUTEROL SULFATE 2.5 MG: 2.5 SOLUTION RESPIRATORY (INHALATION) at 05:43

## 2018-09-18 RX ADMIN — TRAMADOL HYDROCHLORIDE 50 MG: 50 TABLET, FILM COATED ORAL at 18:31

## 2018-09-18 RX ADMIN — INSULIN LISPRO 2 UNITS: 100 INJECTION, SOLUTION INTRAVENOUS; SUBCUTANEOUS at 12:04

## 2018-09-18 RX ADMIN — SIMVASTATIN 20 MG: 20 TABLET, FILM COATED ORAL at 21:26

## 2018-09-18 RX ADMIN — TIOTROPIUM BROMIDE 18 MCG: 18 CAPSULE ORAL; RESPIRATORY (INHALATION) at 05:44

## 2018-09-18 RX ADMIN — ENOXAPARIN SODIUM 100 MG: 100 INJECTION SUBCUTANEOUS at 17:43

## 2018-09-18 RX ADMIN — ISOSORBIDE MONONITRATE 30 MG: 30 TABLET, EXTENDED RELEASE ORAL at 08:11

## 2018-09-18 RX ADMIN — PANTOPRAZOLE SODIUM 40 MG: 40 TABLET, DELAYED RELEASE ORAL at 08:11

## 2018-09-18 RX ADMIN — BUDESONIDE 500 MCG: 0.5 INHALANT RESPIRATORY (INHALATION) at 17:14

## 2018-09-18 RX ADMIN — BUDESONIDE 500 MCG: 0.5 INHALANT RESPIRATORY (INHALATION) at 05:43

## 2018-09-18 RX ADMIN — WARFARIN SODIUM 5 MG: 5 TABLET ORAL at 17:42

## 2018-09-18 RX ADMIN — OXYCODONE HYDROCHLORIDE 10 MG: 5 TABLET ORAL at 21:58

## 2018-09-18 RX ADMIN — FUROSEMIDE 40 MG: 40 TABLET ORAL at 08:11

## 2018-09-18 RX ADMIN — CARVEDILOL 6.25 MG: 6.25 TABLET, FILM COATED ORAL at 08:11

## 2018-09-18 RX ADMIN — ASPIRIN 81 MG: 81 TABLET, COATED ORAL at 08:10

## 2018-09-18 RX ADMIN — ALBUTEROL SULFATE 2.5 MG: 2.5 SOLUTION RESPIRATORY (INHALATION) at 17:14

## 2018-09-18 RX ADMIN — OXYCODONE HYDROCHLORIDE 10 MG: 5 TABLET ORAL at 10:09

## 2018-09-18 RX ADMIN — CARVEDILOL 6.25 MG: 6.25 TABLET, FILM COATED ORAL at 17:42

## 2018-09-18 NOTE — PROGRESS NOTES
Pt was incontinent of urine brief changed . Pt stated she had a good day no complaints at this time . Hourly rounds made this shift .

## 2018-09-18 NOTE — PROGRESS NOTES
OT Daily Note Time In 1301 Time Out 1397 Subjective: \"I started to feel it at the end. \" [tolerated abbreviated RUE AAROM towel slides this session] Pain: Not rated, therapy to tolerance Precautions:  (low vision, fall risk) Balance Patient completed standing trials unsupported for dynamic standing balance and LUE endurance task. Patient transferred sit to stand and maintained balance with SBA, participated in bean bag toss x 2 trials using LUE to retrieve bean bags from tray table on L side and toss towards target forward on floor. Patient with good accuracy and tolerated standing trials ~3 minutes each before requiring seated rest breaks. Strengthening/ROM Patient completed standing trial at elevated tabletop for RUE AAROM towel slides promoting ROM and strength. Patient stood with SBA for balance stabilizing through LUE on tabletop, completed 1 set x 10 reps of shoulder flexion/extension and horizontal adduction/abduction with limited ROM however increased tolerance in regard to pain (normally reports 10/10 with ROM exercise at R shoulder). Coordination Patient completed B hand strengthening and [address] task seated in wheelchair at tabletop. Patient used B hands to manipulate soft (yellow) Theraputty, locate and retrieve x 9 medium sized beads from embedded in putty and replace into container on tabletop. Patient required increased time to complete. Assessment: Patient tolerated well. Improved tolerance utilizing towel slide to promote R shoulder ROM Education: Purpose of therapy Interdisciplinary Communication: Collaborated with Grant Araujo and agreed patient is progressing well and on-track to meet goals as stated in [facility]. Plan: Continue to address ADL/IADL, functional mobility, activity tolerance, balance, strengthening, coordination, education, cognition.  
 
 
Geovani Martinez, OTR/L

## 2018-09-18 NOTE — PROGRESS NOTES
OT WEEKLY PROGRESS NOTE Time In: 4532 Time Out: 1000 COMPREHENSION MODE Initial Assessment Weekly Progress Assessment 9/18/2018 Score 7 7 EXPRESSION Initial Assessment Weekly Progress Assessment 9/18/2018 Primary Mode of Expression Verbal Verbal  
Score 7 7 Comments WNL WNL  
 
SOCIAL INTERACTION/ PRAGMATICS Initial Assessment Weekly Progress Assessment 9/18/2018 Score 7 7 Comments Pleasant and agreeable Pleasant PROBLEM SOLVING Initial Assessment Weekly Progress Assessment 9/18/2018 Score 6 6 Comments Increased time Increased time MEMORY Initial Assessment Weekly Progress Assessment 9/18/2018 Score 6 6 Comments Increased time Increased time LOWER BODY DRESSING/UNDRESSING Initial Assessment Weekly Progress Assessment 9/18/2018 Functional Level 1 5 Items applied/Steps completed Sock, left (1 step), Sock, right (1 step), Elastic waist pants (3 steps) Elastic waist pants (3 steps); Shoe, right (1 step); Shoe, left (1 step) Comments Patient able to participate in managing pants over hips only Supervision for standing balance EOB as patient manages pants over hips Plan of Care: Please see Care Plan for updated LTGs. Family Training: To complete prior to discharge Summary of Progress: Patient demonstrates progress with balance, functional mobility, strength, activity tolerance, and coordination for performance of ADL and functional transfers, see above for details. Patient is limited primarily by premorbid orthopaedic issues affecting BUE, R > L. Patient continues to require skilled OT services to address deficits, provide education, and progress towards goals as stated in POC. Summary of Session:  
S: \"If you want it to look good we'll be here all day. \" [IADL laundry folding task] Agreeable to therapy. Focus of session was on completing ADL followed by BUE strengthening and coordination tasks. Pain 8/10 in R shoulder, Biofreeze applied and RN notified. Patient completed standing trial at elevated tabletop for BUE endurance and [facility] task, supervision for dynamic standing balance during task. Patient reached to L of tabletop using LUE to gather clothing items, folded items using BUE, and slid folded items to R of tabletop using RUE. Patient tolerated task ~5 minutes before requesting alternate task. Patient completed 2 sets x 10 reps of LUE therapeutic exercises seated in wheelchair with 1.5# cuff weight donned to L wrist.  Patient completed shoulder flexion, shoulder abduction, scapular protraction/retraction, elbow flexion, and elbow extension exercises. Collaborated with PT, Laurie Rueda and confirmed patient is on track to reach goals as documented in the care plan. Patient tolerated session well, but activity tolerance, balance, functional mobility, strength, coordination are still below baseline and require skilled facilitation to successfully and safely complete ADL's and transfers. Patient ended session in recliner with call remote and phone within reach.   
 
Marlin Meehan, SAMIRR/L

## 2018-09-18 NOTE — PROGRESS NOTES
Eyal Snowden MD, Medical Director Marcel Schwartz [address], [address] [facility] Tel: [phone] SFD PROGRESS NOTE [address]  Admit Date: 9/11/2018 Admit Diagnosis: debility; Physical debility Subjective Really surprised self with therapies yesterday. Did very well. Increased endurance. Chronic 3L NC. She is very pleased with her progress Objective:  
 
Current Facility-Administered Medications Medication Dose Route Frequency  warfarin (COUMADIN) tablet 5 mg  5 mg Oral QPM  
 furosemide (LASIX) tablet 40 mg  40 mg Oral DAILY  traZODone (DESYREL) tablet 25-50 mg  25-50 mg Oral QHS  
 0.9% sodium chloride infusion 250 mL  250 mL IntraVENous PRN  
 acetaminophen (TYLENOL) tablet 650 mg  650 mg Oral Q6H PRN  
 sodium chloride (NS) flush 5-10 mL  5-10 mL IntraVENous PRN  
 albuterol (PROVENTIL VENTOLIN) nebulizer solution 2.5 mg  2.5 mg Nebulization Q4H PRN  
 alum-mag hydroxide-simeth (MYLANTA) oral suspension 30 mL  30 mL Oral Q4H PRN  
 ascorbic acid (vitamin C) (VITAMIN C) tablet 500 mg  500 mg Oral DAILY  aspirin delayed-release tablet 81 mg  81 mg Oral DAILY  bisacodyl (DULCOLAX) suppository 10 mg  10 mg Rectal DAILY PRN  
 budesonide (PULMICORT) 500 mcg/2 ml nebulizer suspension  500 mcg Nebulization BID RT And  
 albuterol (PROVENTIL VENTOLIN) nebulizer solution 2.5 mg  2.5 mg Nebulization Q6HWA RT  
 carvedilol (COREG) tablet 6.25 mg  6.25 mg Oral BID WITH MEALS  enoxaparin (LOVENOX) injection 100 mg  100 mg SubCUTAneous Q24H  
 escitalopram oxalate (LEXAPRO) tablet 20 mg  20 mg Oral DAILY  influenza vaccine 2018-19 (6 mos+)(PF) (FLUARIX QUAD/FLULAVAL QUAD) injection 0.5 mL  0.5 mL IntraMUSCular PRIOR TO DISCHARGE  insulin lispro (HUMALOG) injection   SubCUTAneous AC&HS  isosorbide mononitrate ER (IMDUR) tablet 30 mg  30 mg Oral DAILY  ondansetron (ZOFRAN ODT) tablet 4 mg  4 mg Oral Q6H PRN  
  oxyCODONE IR (ROXICODONE) tablet 5-10 mg  5-10 mg Oral Q4H PRN  pantoprazole (PROTONIX) tablet 40 mg  40 mg Oral ACB  polyethylene glycol (MIRALAX) packet 17 g  17 g Oral DAILY PRN  
 simvastatin (ZOCOR) tablet 20 mg  20 mg Oral QHS  tiotropium (SPIRIVA) inhalation capsule 18 mcg  18 mcg Inhalation DAILY  traMADol (ULTRAM) tablet 50 mg  50 mg Oral Q4H PRN Review of Systems:Denies chest pain, shortness of breath, cough, headache, visual problems, abdominal pain, dysurea, calf pain. Pertinent positives are as noted in the medical records and unremarkable otherwise. Visit Vitals  /75  Pulse 67  Temp 98.8 °F (37.1 °C)  Resp 16  SpO2 98% Physical Exam:  
General: Alert and age appropriately oriented. No acute cardio respiratory distress. HEENT: Normocephalic,no scleral icterus Oral mucosa moist without cyanosis Lungs: Clear to auscultation  bilaterally. Respiration even and unlabored Heart: Regular rate and rhythm, S1, S2 No  murmurs, clicks, rub or gallops Abdomen: Soft, non-tender, nondistended. Bowel sounds present. No organomegaly. obese Genitourinary: defer Neuromuscular:  
 
 Grossly no focal motor deficits noted. Moves ankles. Ankle dorsiflexion 5/5 Ankle plantarflexion 5/5 No sensory deficits distally. Has prox>distal generalized weakness Exam limited by pain. Skin/extremity: No rashes, no erythema. No calf tenderness BLE No edema Functional Assessment: 
   
   
Balance Sitting - Static: Good (unsupported) (09/17/18 1500) Sitting - Dynamic: Good (unsupported) (09/17/18 1500) Standing - Static: Good (with rollator) (09/17/18 1500) Standing - Dynamic : Impaired (09/17/18 1500) [place] Fall Risk Assessment: 
Maria Ines Mcnulty Risk Mobility: Ambulates or transfers with assist devices or assistance (09/18/18 0200) Mobility Interventions: Patient to call before getting OOB (09/18/18 0200) Mentation: Alert, oriented x 3 (09/18/18 0200) Medication: Patient receiving anticonvulsants, sedatives(tranquilizers), psychotropics or hypnotics, hypoglycemics, narcotics, sleep aids, antihypertensives, laxatives, or diuretics (09/18/18 0200) Medication Interventions: Bed/chair exit alarm (09/18/18 0200) Elimination: Needs assistance with toileting (09/18/18 0200) Elimination Interventions: Patient to call for help with toileting needs (09/18/18 0200) Prior Fall History: Before admission in past 12 months _home or previous inpatient care) (09/18/18 0200) History of Falls Interventions: Door open when patient unattended (09/18/18 0200) Total Score: 4 (09/18/18 0200) Standard Fall Precautions: Yes (09/18/18 0200) High Fall Risk: Yes (09/16/18 2025) Speech Assessment: 
    
 
Ambulation: 
Gait Distance (ft): 150 Feet (ft) (09/17/18 1500) Assistive Device: Walker, rolling (09/17/18 1500) Rail Use: Left  (09/17/18 1500) Labs/Studies: 
Recent Results (from the past 72 hour(s)) GLUCOSE, POC Collection Time: 09/15/18 11:04 AM  
Result Value Ref Range Glucose (POC) 158 (H) 65 - 100 mg/dL GLUCOSE, POC Collection Time: 09/15/18  4:27 PM  
Result Value Ref Range Glucose (POC) 162 (H) 65 - 100 mg/dL GLUCOSE, POC Collection Time: 09/15/18  8:38 PM  
Result Value Ref Range Glucose (POC) 182 (H) 65 - 100 mg/dL PROTHROMBIN TIME + INR Collection Time: 09/16/18  5:49 AM  
Result Value Ref Range Prothrombin time 18.3 (H) 11.5 - 14.5 sec INR 1.6 METABOLIC PANEL, BASIC Collection Time: 09/16/18  5:49 AM  
Result Value Ref Range Sodium 144 136 - 145 mmol/L Potassium 4.7 3.5 - 5.1 mmol/L Chloride 102 98 - 107 mmol/L  
 CO2 32 21 - 32 mmol/L Anion gap 10 7 - 16 mmol/L Glucose 86 65 - 100 mg/dL BUN 65 (H) 8 - 23 MG/DL  Creatinine 1.96 (H) 0.6 - 1.0 MG/DL  
 GFR est AA 32 (L) >60 ml/min/1.73m2 GFR est non-AA 27 (L) >60 ml/min/1.73m2 Calcium 9.3 8.3 - 10.4 MG/DL  
GLUCOSE, POC Collection Time: 09/16/18  7:33 AM  
Result Value Ref Range Glucose (POC) 126 (H) 65 - 100 mg/dL GLUCOSE, POC Collection Time: 09/16/18 11:16 AM  
Result Value Ref Range Glucose (POC) 156 (H) 65 - 100 mg/dL GLUCOSE, POC Collection Time: 09/16/18  4:52 PM  
Result Value Ref Range Glucose (POC) 128 (H) 65 - 100 mg/dL GLUCOSE, POC Collection Time: 09/16/18  8:58 PM  
Result Value Ref Range Glucose (POC) 210 (H) 65 - 100 mg/dL PROTHROMBIN TIME + INR Collection Time: 09/17/18  5:39 AM  
Result Value Ref Range Prothrombin time 18.2 (H) 11.5 - 14.5 sec INR 1.6 METABOLIC PANEL, BASIC Collection Time: 09/17/18  5:39 AM  
Result Value Ref Range Sodium 141 136 - 145 mmol/L Potassium 4.6 3.5 - 5.1 mmol/L Chloride 101 98 - 107 mmol/L  
 CO2 34 (H) 21 - 32 mmol/L Anion gap 6 (L) 7 - 16 mmol/L Glucose 79 65 - 100 mg/dL BUN 54 (H) 8 - 23 MG/DL Creatinine 1.69 (H) 0.6 - 1.0 MG/DL  
 GFR est AA 38 (L) >60 ml/min/1.73m2 GFR est non-AA 32 (L) >60 ml/min/1.73m2 Calcium 9.1 8.3 - 10.4 MG/DL  
GLUCOSE, POC Collection Time: 09/17/18  7:13 AM  
Result Value Ref Range Glucose (POC) 87 65 - 100 mg/dL GLUCOSE, POC Collection Time: 09/17/18 11:11 AM  
Result Value Ref Range Glucose (POC) 118 (H) 65 - 100 mg/dL GLUCOSE, POC Collection Time: 09/17/18  4:23 PM  
Result Value Ref Range Glucose (POC) 129 (H) 65 - 100 mg/dL GLUCOSE, POC Collection Time: 09/17/18  9:01 PM  
Result Value Ref Range Glucose (POC) 149 (H) 65 - 100 mg/dL PROTHROMBIN TIME + INR Collection Time: 09/18/18  6:10 AM  
Result Value Ref Range Prothrombin time 19.4 (H) 11.5 - 14.5 sec INR 1.7 GLUCOSE, POC Collection Time: 09/18/18  6:50 AM  
Result Value Ref Range Glucose (POC) 97 65 - 100 mg/dL Assessment: Problem List as of 9/18/2018  Date Reviewed: 8/31/2018 Codes Class Noted - Resolved Coagulopathy ([facility] INR >4 on admission 9/7/18 ICD-10-CM: D68.9 ICD-9-CM: 286.9  9/8/2018 - Present Traumatic hematoma of left knee ICD-10-CM: M07.52ZA ICD-9-CM: 924.11  9/8/2018 - Present Debility ICD-10-CM: R53.81 ICD-9-CM: 799.3  9/8/2018 - Present Anemia ICD-10-CM: D64.9 ICD-9-CM: 285.9  9/7/2018 - Present Chronic respiratory failure with hypoxia (HCC) (Chronic) ICD-10-CM: J96.11 
ICD-9-CM: 518.83, 799.02  9/7/2018 - Present Acute kidney injury superimposed on chronic kidney disease ([facility] ICD-10-CM: N17.9, N18.9 ICD-9-CM: 866.00, 585.9  9/7/2018 - Present Encounter for immunization ICD-10-CM: L42 ICD-9-CM: V03.89  8/21/2018 - Present Obesity, morbid ([facility].) ICD-10-CM: E66.01 
ICD-9-CM: 278.01  6/8/2018 - Present * (Principal)Physical debility ICD-10-CM: R53.81 ICD-9-CM: 799.3  5/2/2018 - Present Closed nondisplaced fracture of shaft of fifth metacarpal bone of left hand ICD-10-CM: R86.497J ICD-9-CM: 815.03  4/28/2018 - Present Subdural hematoma (HCC) ICD-10-CM: I62.00 ICD-9-CM: 432.1  4/27/2018 - Present Long term (current) use of anticoagulants (Chronic) ICD-10-CM: Z79.01 
ICD-9-CM: V58.61  4/19/2018 - Present Dysthymia ICD-10-CM: F34.1 ICD-9-CM: 300.4  4/5/2018 - Present Type 2 diabetes mellitus with nephropathy (HCC) (Chronic) ICD-10-CM: E11.21 
ICD-9-CM: 250.40, 583.81  12/18/2017 - Present Pulmonary hypertension (HCC) (Chronic) ICD-10-CM: I27.20 ICD-9-CM: 416.8  6/15/2016 - Present S/P AVR (aortic valve replacement) (Chronic) ICD-10-CM: Z95.2 ICD-9-CM: V43.3  Unknown - Present Overview Signed 2/23/2016 11:04 AM by Andrea Vaz Mechanical/Artific Cardiomyopathy ([facility] (Chronic) ICD-10-CM: I42.9 ICD-9-CM: 425.4  Unknown - Present Osteopenia ICD-10-CM: M85.80 ICD-9-CM: 733.90  Unknown - Present HLD (hyperlipidemia) (Chronic) ICD-10-CM: L19.5 ICD-9-CM: 272.4  Unknown - Present Osteoarthritis ICD-10-CM: M19.90 ICD-9-CM: 715.90  Unknown - Present  
   
 ICD (implantable cardioverter-defibrillator) in place ICD-10-CM: Z95.810 ICD-9-CM: V45.02  10/2/2014 - Present Overview Signed 10/2/2014  4:58 PM by Teddy Heredia III [employer] single-chamber ICD implantation 10/20/14 Diabetes mellitus type 2, diet-controlled (HCC) (Chronic) ICD-10-CM: E11.9 ICD-9-CM: 250.00  8/28/2014 - Present COPD (chronic obstructive pulmonary disease) (HCC) (Chronic) ICD-10-CM: J44.9 ICD-9-CM: 627  4/2/2014 - Present REJI (obstructive sleep apnea)-cpap (Chronic) ICD-10-CM: G47.33 
ICD-9-CM: 327.23  4/2/2014 - Present CKD (chronic kidney disease) stage 3, GFR 30-59 ml/min (Chronic) ICD-10-CM: N18.3 ICD-9-CM: 585.3  7/10/2013 - Present Anticoagulated on Coumadin (Chronic) ICD-10-CM: Z51.81, Z79.01 
ICD-9-CM: V58.83, V58.61  7/9/2013 - Present Overview Signed 7/9/2013  4:16 PM by Albina Chance NP  
  S/P AVR 
  
  
   
 CAD (coronary artery disease) (Chronic) ICD-10-CM: I25.10 ICD-9-CM: 414.00  1/20/2013 - Present Overview Signed 5/20/2014 10:05 AM by Estephanie Ramirez NP  
  5/8/14 PCI LAD with stent placed Chronic combined systolic and diastolic heart failure (HCC) (Chronic) ICD-10-CM: I50.42 
ICD-9-CM: 428.42  1/20/2013 - Present Overview Addendum 4/28/2018  4:53 AM by Madalyn Caballero MD  
  5/8/14 ECHO:  EF 10-15% 12/2017:  EF 25-30% Essential hypertension, benign (Chronic) ICD-10-CM: I10 
ICD-9-CM: 401.1  1/20/2013 - Present MDS (myelodysplastic syndrome) (HCC) (Chronic) ICD-10-CM: D46.9 ICD-9-CM: 238.75  12/17/2011 - Present Overview Addendum 8/29/2013 11:06 AM by Luis Groves started in August, 2011 12/18/11 Procrit weekly and Iron stores. 5-12 12-13-12 good response to 3 weekly procrit did not need it last time 3-7-13 Pt doing well. Just wanted a \"check-up. \" Responding to Procrit every three weeks. 8-29-13 patient has missed some injections on recently restarted hemoglobin only issue , takes oral iron iron stores each time Iron deficiency anemia due to chronic blood loss (Chronic) ICD-10-CM: D50.0 ICD-9-CM: 280.0  7/29/2009 - Present RESOLVED: Routine general medical examination at a health care facility ICD-10-CM: Z00.00 ICD-9-CM: V70.0  12/16/2016 - 4/27/2018  
   
  
 
 
  
Assessment: Profound Physical Debility s/p admitted with moderately severe anemia 
   
Continue daily physician medical management: 
Pneumonia prophylaxis- Incentive spirometer every hour while awake 
   
Chronic hypoxic respir failure; cont RT txs. On Trilogy at [facility]. Monitor O2 sats and cont O2 supplementation. Currently on 3L per NC 
   
Insomnia; add Trazadone, or dtg can bring in her Melatonin 
   
DVT risk / DVT Prophylaxis- Will require daily physician exam to assess for signs and symptoms as patient is at increased risk for of thromboembolism. Mobilization as tolerated. Intermittent pneumatic compression devices when in bed Thigh-high or knee-high thromboembolic deterrent hose when out of bed.  No further anticoag is needed; on therapeutic Lovenox while bridging to coumadin 
-9/12 inr down 1.3 ;pharmacy dosing; 9/13 not changed ; now on 5mg coumadin; cont lovenox until therapeutic 
-9/14 INR still subtherap at 1.3 , thus continue sq Lovenox; pharmacy dosing; 9/17 INR 1.6; pharm dosing; still on 5mg, likely need to increase; would like INR 2.5 to 3 
-8/19 INR 1.7 still subtherapeutic; on 5mg 
   
S/p mech AVR; chronic coumadin therapy but such hi risk of bleeding due to multiple falls.   
   
CHF; cont aldactone, coreg, imdur and lasix; 9/13 pt was on Lasix 80mg bid during acute stay, have dec to 40 mg bid; states she takes 20mg at home; monitor closely for signs of chf exacerb.  
-9/14 compensated; monitor as Lasix has decreased to 40 bid, home dose 20 daily; FELIZ on CKD thus decreasing Lasix; change to 40 daily  
-9/17 no peripheral edema, no s/s of CHF, kidney fxn improving; cont 40mg for now of Lasix; bmp 9/19 
   
Pain Control: ongoing significant pain in knees which is stable and controlled by PRN meds. Will require regular pain assessment and comprenhensive pain management,  
   
Wound Care: Monitor wound status daily per staff and physician. At risk for failure. Will require 24/7 rehab nursing. Keep wound clean and dry, Reinforce dressing PRN and Ice to area for comfort 
   
Hypertension - BP controlled, fluctuating, managed medically.  
   
Acute on chronic anemia; possible MDS followed by Dr Rosalino Olsen. Monitor closely, sp transfusion. Cont epocrit. Cont ferrous sulfate and vit C 
   
Depression; cont Lexapro. Depression regarding medical cond and fear of losing independence 
   
Diabetes mellitus - controlled. poor glycemic control. Will require daily, close FSG monitoring and medication adjustment to optimize glycemic control in setting of acute illness and hospitalization.  
-diet controlled so far 
   
Urinary retention/ neurogenic bladder - schedule voids q6-8 hrs. Check post-void residual as needed; In and Out catheterize if post-void residual is more than 400 cc. 
-having urinary incont; will try timed voids due to urgency 
   
CKD stg 3; creat 2.05 on admission to rehab. Was 1.55 yesterday. Recheck in a.m and review meds contributing. 
-9/12 creat is increasing; dec lasix; recheck pending 9/13. 2.48 Today 9/14 creat 2.35, BUN 74; slowly improving with drop in Lasix/CHF well compensated; DAILY BMP 
-creatinine continues to improve with decreased Lasix; no s/s of chf, no peripheral edema and lungs clear; creat 1.69 from 1.96 yest. BUN down to 54 from 65 yesterday. Close to baseline? -recheck 9/19. Today no edema, no increased SOB to suggest fluid overload 
   
bowel program - add bowel program prn 
   
GERD - add PPI. At times may need additional antacids, Maalox prn. Previous hx of GIB;HH stable 
   
  
 
 
Time spent was 25 minutes with over 1/2 in direct patient care/examination, consultation and coordination of care. Signed By: Ousmane Robert MD   
 September 18, 2018

## 2018-09-18 NOTE — PROGRESS NOTES
Pt  Completed ADLS with Delma CARLTON . Dressing change to left leg completed . Cleaned wound with derma wound cleanser and  placed non-adhesive drsg and covered with pad dressing . Pt tolerated well . No complaints noted .

## 2018-09-18 NOTE — PROGRESS NOTES
PHYSICAL THERAPY DAILY NOTE Time In: 7481 Time Out: 1201 Patient Seen For: AM;Balance activities;Gait training;Patient education; Therapeutic exercise;Transfer training; Other (see progress notes) Subjective: Patient reporting she is tired from AM ADLs. Agreeable to exercise and gait training. Reports she would like to go home soon. Objective:Vital Signs: 
Patient Vitals for the past 12 hrs: 
 Temp Pulse Resp BP SpO2  
09/18/18 0742 98.8 °F (37.1 °C) 67 16 116/75 98 % 09/18/18 0544 - - - - 95 % Patient on 02 at 3 lpm. 02 sat 94% and HR 74 at rest. 02 sat 89% and HR 77 after ambulating 170ft Pain level:No c/o pain during treatment Pain location:NA Pain interventions:NA Patient education:Balance training,transfer training, gait training, fall precautions, activity pacing,body mechanics, rollator parts management, energy conservation, breathing techniques during gait training. Patient verbalizing understanding and demonstrating  understanding of patient education. Recommend follow up education. Interdisciplinary Communication:NA Other (comment) (Low Vision, Fall Risk) GROSS ASSESSMENT Daily Assessment NA  
 
 
BED/MAT MOBILITY Daily Assessment Rolling Right : 0 (Not tested) Rolling Left : 0 (Not tested) Supine to Sit : 0 (Not tested) Sit to Supine : 0 (Not tested) TRANSFERS Daily Assessment Increased time and effort to complete with cues for body mechanics and rollator set up Commode transfers with supervision. Min assist with lower body clothing management. Independent with hygiene Transfer Type: SPT without device Other: s Transfer Assistance : 5 (Supervision/setup) Sit to Stand Assistance: Modified independent Car Transfers: Not tested GAIT Daily Assessment Amount of Assistance: 5 (Supervision/setup) Distance (ft): 170 Feet (ft) Assistive Device: Walker, rollator Gait training with cues to control breathing pattern with gait speed STEPS or STAIRS Daily Assessment Steps/Stairs Ambulated (#): 0 Level of Assist : 0 (Not tested) BALANCE Daily Assessment Sitting - Static: Good (unsupported) Sitting - Dynamic: Good (unsupported) Standing - Static: Good Standing - Dynamic : Impaired WHEELCHAIR MOBILITY Daily Assessment Curbs/Ramps Assist Required (FIM Score): 0 (Not tested) Wheelchair Setup Assist Required : 0 (Not tested) LOWER EXTREMITY EXERCISES Daily Assessment Extremity: Both Exercise Type #1: Seated lower extremity strengthening Sets Performed: 2 Reps Performed: 10 Level of Assist: Minimal assistance SEATED EXERCISES Sets Reps Comments Ankle Pumps 1 20 Hip Flexion 2 10 [address] 2 10 Hip Adduction/Ball Squeeze 2 10 Hip Abduction with green Theraband 2 10 Hamstring Curls with green Theraband 2 10 Hip Extension with green Theraband 2 10 Assessment: Progressing towards modified independence with transfers and gait with rollator. Functional endurance improving as well as ability to control breathing pattern Patient returned to room at end of treatment and remained up in recliner with LEs elevated and with needs in reach. 02 at 3 lpm 
 
Plan of Care: complete weekly assessment this PM 
 
Latoya Moreno, PT 
9/18/2018

## 2018-09-18 NOTE — PROGRESS NOTES
Problem: Falls - Risk of 
Goal: *Absence of Falls Document Padma Chicas Fall Risk and appropriate interventions in the flowsheet. Outcome: Progressing Towards Goal 
Fall Risk Interventions: 
Mobility Interventions: Patient to call before getting OOB Medication Interventions: Bed/chair exit alarm Elimination Interventions: Patient to call for help with toileting needs History of Falls Interventions: Door open when patient unattended

## 2018-09-18 NOTE — PROGRESS NOTES
Warfarin dosing per pharmacist 
 
Vaibhav Myers Maria C Wilkins is a 77 y.o. female. Indication:  S/p AVR Goal INR:  2-3 Home dose:  5 mg Sun and 2.5 mg all other days  (Weekly dose of 20 mg) Risk factors or significant drug interactions:  none Other anticoagulants:  Lovenox bridge Daily Monitoring Date  INR     Warfarin dose  HGB              Notes 9/8  3.4  Hold   9.3 
9/9  2.8  Hold   9.0  
9/10  2.0  Hold   9.6 9/11  1.5  5 mg    9.5 9/12  1.3  5 mg   10.5 9/13  1.3  5 mg   --- 
9/14  1.3  (15 mg)*  ---  *patient mistakenly given 7.5 mg dose X 2, Deirdre Rice entered 9/15  1.4  Hold   --- 
9/16  1.6  5 mg   --- 
9/17  1.6  5 mg   --- 
9/17  1.7  5 mg   -- Pharmacy has been consulted to dose warfarin for this patient due to a history of AVR (mechanical). The INR goal per the patient's home warfarin clinic is 2-3. Patient initially presented with a supratherapeutic INR that has trended down. INR up to 1.7. Will continue 5 mg this evening. Following closely with daily INR. Okay to continue lovenox bridge therapy until INR therapeutic. Thank you, Jordyn Aguirre, Pharm. D. Clinical Pharmacist 
[phone]

## 2018-09-18 NOTE — PROGRESS NOTES
PT WEEKLY PROGRESS NOTE Time In: 2637 Time Out: 0872 Subjective: Patient reporting her right shoulder is sore. Reports OT has been working with it. Reports she would liek to go home soon. Reports she edil been in the hospital a lot in 2018 Objective: Vital Signs: 
Patient Vitals for the past 12 hrs: 
 Temp Pulse Resp BP SpO2  
09/18/18 1535 98 °F (36.7 °C) 63 16 121/72 98 % 09/18/18 0742 98.8 °F (37.1 °C) 67 16 116/75 98 % 09/18/18 0544 - - - - 95 % Pain level:4 out of 10 Pain location:right shoulder Pain interventions:Pain medication per RN, rest, positioning,OT addressing with ROM and biofreeze and massage Patient education:Bed mobility training,transfer training, gait training, fall precautions, activity pacing,body mechanics, rollator parts management, energy conservation, breathing techniques during functional mobility, Patient verbalizing understanding and demonstrating understanding of patient education. Recommend follow up education. Interdisciplinary Communication:spoke with MD and OT regarding progress towards goals and D/C plans Other (comment) (Low Vision, Fall Risk) Outcome Measures: FIM SCORES Initial Assessment Weekly Progress Assessment 9/18/2018 Bed/Chair/Wheelchair Transfers 4 5 Wheelchair Mobility 0 0 Walking [place] 1 5 Steps/Stairs 2 2 Please see [facility] Interdisciplinary Eval: Coordination/Balance Section for details regarding FIM score description. BED/CHAIR/WHEELCHAIR TRANSFERS Initial Assessment Weekly Progress Assessment 9/18/2018 Rolling Right 6 (Modified independent) 0 (Not tested) Rolling Left 6 (Modified independent) 0 (Not tested) Supine to Sit 5 (Supervision) 6 (Modified independent) Sit to Stand Contact guard assistance Modified independent Sit to Supine 4 (Minimal assistance) 5 (Supervision) Transfer Type SPT without device SPT without device Comments Increased time and effort to complete bed mobility and transfers. Increased time and effort to complete with cues for body mechanics Cues for rollator parts management Car Transfer Not tested Not tested Car Type GROSS ASSESSMENT Weekly Progress Assessment 9/18/2018 AROM Generally decreased, functional  
Strength Generally decreased, functional  
Coordination  LE generally decreased, functional  
Tone  LE normal  
Sensation  LE intact PROM  LE generally decreased POSTURE Weekly Progress Assessment 9/18/2018 Posture (WDL)  forward head kyphosis rounded shoudlers Posture Assessment [facility] MOBILITY/MANAGEMENT Initial Assessment Weekly Progress Assessment 9/18/2018 Able to Propel 0 feet  NA  
Curbs/ramps assistance required 0 (Not tested) 0 (Not tested) Wheelchair set up assistance required 0 (Not tested)  NA Wheelchair management    NA  
 
WALKING INDEPENDENCE Initial Assessment Weekly Progress Assessment 9/18/2018 Assistive device Gait belt Walker, rollator Ambulation assistance - level surface 4 (Contact guard assistance)  supervision Distance 40 Feet (ft) 170 Feet (ft) Comments slow cont partial step through gait pattern with trunk flexion, increased base of support with increased hip ext rot, decreased ankle PF and knee flexion at terminal stance and decreased knee ext and ankle DF at initial contact Slow cont step through gait pattern demonstrating improved gait pattern and gait posture with improved ability to control breathing pattern GAIT Weekly Progress Assessment 9/18/2018 Gait Description (WDL) Gait Abnormalities STEPS/STAIRS Initial Assessment Weekly Progress Assessment 9/18/2018 Steps/Stairs ambulated 4 4 Rail Use Right  Left Comments slow reciprocating pattern going up steps and single step at a time going down steps leading down with LLE.  Increased time and effort to complete with cues for actiity pacing and safe foot placement  slow reciprocating pattern going up/down steps with 3 min sitting rest break after going up steps. Patient demonstrating improved step clearance. Curbs/Ramps 0 (Not tested)  up/down 10 ft ramp with rollator and SBA Assessment: patient making good progress towards goals. Patient has not met LTGs per initial eval but has good potential to meet goals by scheduled D/C date of 09/25/18. Functional strength and endurance improving with patient demonstrating improved ability to control breathing pattern during functional mobility. recommend cont inpatient rehab to maximize rehab poptential 
 
  
Patient returned to room at end of treatment and remained up in recliner with LEs elevated and with needs in reach. 02 at 3 lpm 
 
Plan of Care: Patient seen for weekly progress note. Goals updated and revised. Please see Care Plan for updated LTGs. Family Training:  to be scheduled prn prior to D/C Michael Rodriguez, PT 
9/18/2018

## 2018-09-19 LAB
ANION GAP SERPL CALC-SCNC: 5 MMOL/L (ref 7–16)
BUN SERPL-MCNC: 47 MG/DL (ref 8–23)
CALCIUM SERPL-MCNC: 9.2 MG/DL (ref 8.3–10.4)
CHLORIDE SERPL-SCNC: 102 MMOL/L (ref 98–107)
CO2 SERPL-SCNC: 34 MMOL/L (ref 21–32)
CREAT SERPL-MCNC: 1.65 MG/DL (ref 0.6–1)
GLUCOSE BLD STRIP.AUTO-MCNC: 102 MG/DL (ref 65–100)
GLUCOSE BLD STRIP.AUTO-MCNC: 126 MG/DL (ref 65–100)
GLUCOSE BLD STRIP.AUTO-MCNC: 173 MG/DL (ref 65–100)
GLUCOSE BLD STRIP.AUTO-MCNC: 90 MG/DL (ref 65–100)
GLUCOSE SERPL-MCNC: 85 MG/DL (ref 65–100)
INR PPP: 1.8
POTASSIUM SERPL-SCNC: 4.5 MMOL/L (ref 3.5–5.1)
PROTHROMBIN TIME: 20.1 SEC (ref 11.5–14.5)
SODIUM SERPL-SCNC: 141 MMOL/L (ref 136–145)

## 2018-09-19 PROCEDURE — 82962 GLUCOSE BLOOD TEST: CPT

## 2018-09-19 PROCEDURE — 85610 PROTHROMBIN TIME: CPT

## 2018-09-19 PROCEDURE — 97530 THERAPEUTIC ACTIVITIES: CPT

## 2018-09-19 PROCEDURE — 97535 SELF CARE MNGMENT TRAINING: CPT

## 2018-09-19 PROCEDURE — 94760 N-INVAS EAR/PLS OXIMETRY 1: CPT

## 2018-09-19 PROCEDURE — 99232 SBSQ HOSP IP/OBS MODERATE 35: CPT | Performed by: PHYSICAL MEDICINE & REHABILITATION

## 2018-09-19 PROCEDURE — 97110 THERAPEUTIC EXERCISES: CPT

## 2018-09-19 PROCEDURE — 65310000000 HC RM PRIVATE REHAB

## 2018-09-19 PROCEDURE — 97116 GAIT TRAINING THERAPY: CPT

## 2018-09-19 PROCEDURE — 94640 AIRWAY INHALATION TREATMENT: CPT

## 2018-09-19 PROCEDURE — 77010033678 HC OXYGEN DAILY

## 2018-09-19 PROCEDURE — 74011250637 HC RX REV CODE- 250/637: Performed by: PHYSICAL MEDICINE & REHABILITATION

## 2018-09-19 PROCEDURE — 74011250636 HC RX REV CODE- 250/636: Performed by: PHYSICAL MEDICINE & REHABILITATION

## 2018-09-19 PROCEDURE — 80048 BASIC METABOLIC PNL TOTAL CA: CPT

## 2018-09-19 PROCEDURE — 36415 COLL VENOUS BLD VENIPUNCTURE: CPT

## 2018-09-19 PROCEDURE — 74011000250 HC RX REV CODE- 250: Performed by: PHYSICAL MEDICINE & REHABILITATION

## 2018-09-19 RX ORDER — WARFARIN SODIUM 5 MG/1
5 TABLET ORAL EVERY EVENING
Status: DISCONTINUED | OUTPATIENT
Start: 2018-09-20 | End: 2018-09-20

## 2018-09-19 RX ORDER — WARFARIN 7.5 MG/1
7.5 TABLET ORAL ONCE
Status: COMPLETED | OUTPATIENT
Start: 2018-09-19 | End: 2018-09-19

## 2018-09-19 RX ADMIN — CARVEDILOL 6.25 MG: 6.25 TABLET, FILM COATED ORAL at 17:09

## 2018-09-19 RX ADMIN — ENOXAPARIN SODIUM 100 MG: 100 INJECTION SUBCUTANEOUS at 17:10

## 2018-09-19 RX ADMIN — ISOSORBIDE MONONITRATE 30 MG: 30 TABLET, EXTENDED RELEASE ORAL at 08:10

## 2018-09-19 RX ADMIN — TRAZODONE HYDROCHLORIDE 50 MG: 50 TABLET ORAL at 21:05

## 2018-09-19 RX ADMIN — FUROSEMIDE 40 MG: 40 TABLET ORAL at 08:10

## 2018-09-19 RX ADMIN — ESCITALOPRAM OXALATE 20 MG: 10 TABLET ORAL at 08:10

## 2018-09-19 RX ADMIN — ASPIRIN 81 MG: 81 TABLET, COATED ORAL at 08:10

## 2018-09-19 RX ADMIN — BUDESONIDE 500 MCG: 0.5 INHALANT RESPIRATORY (INHALATION) at 06:02

## 2018-09-19 RX ADMIN — ALBUTEROL SULFATE 2.5 MG: 2.5 SOLUTION RESPIRATORY (INHALATION) at 11:38

## 2018-09-19 RX ADMIN — OXYCODONE HYDROCHLORIDE AND ACETAMINOPHEN 500 MG: 500 TABLET ORAL at 08:10

## 2018-09-19 RX ADMIN — TIOTROPIUM BROMIDE 18 MCG: 18 CAPSULE ORAL; RESPIRATORY (INHALATION) at 06:03

## 2018-09-19 RX ADMIN — OXYCODONE HYDROCHLORIDE 10 MG: 5 TABLET ORAL at 08:16

## 2018-09-19 RX ADMIN — BUDESONIDE 500 MCG: 0.5 INHALANT RESPIRATORY (INHALATION) at 17:51

## 2018-09-19 RX ADMIN — ALBUTEROL SULFATE 2.5 MG: 2.5 SOLUTION RESPIRATORY (INHALATION) at 06:00

## 2018-09-19 RX ADMIN — TRAMADOL HYDROCHLORIDE 50 MG: 50 TABLET, FILM COATED ORAL at 08:16

## 2018-09-19 RX ADMIN — CARVEDILOL 6.25 MG: 6.25 TABLET, FILM COATED ORAL at 08:10

## 2018-09-19 RX ADMIN — OXYCODONE HYDROCHLORIDE 10 MG: 5 TABLET ORAL at 21:05

## 2018-09-19 RX ADMIN — PANTOPRAZOLE SODIUM 40 MG: 40 TABLET, DELAYED RELEASE ORAL at 07:30

## 2018-09-19 RX ADMIN — ALBUTEROL SULFATE 2.5 MG: 2.5 SOLUTION RESPIRATORY (INHALATION) at 17:51

## 2018-09-19 RX ADMIN — WARFARIN SODIUM 7.5 MG: 7.5 TABLET ORAL at 17:09

## 2018-09-19 RX ADMIN — SIMVASTATIN 20 MG: 20 TABLET, FILM COATED ORAL at 21:05

## 2018-09-19 NOTE — PROGRESS NOTES
Carter Benitez MD, Medical Director Marcel Schwartz [address], [address] [facility] Tel: [phone] SFD PROGRESS NOTE [address]  Admit Date: 9/11/2018 Admit Diagnosis: debility; Physical debility Subjective Doing well. No cp, sob, n/v. Slept ok. Good spirits. Objective:  
 
Current Facility-Administered Medications Medication Dose Route Frequency  warfarin (COUMADIN) tablet 5 mg  5 mg Oral QPM  
 furosemide (LASIX) tablet 40 mg  40 mg Oral DAILY  traZODone (DESYREL) tablet 25-50 mg  25-50 mg Oral QHS  
 0.9% sodium chloride infusion 250 mL  250 mL IntraVENous PRN  
 acetaminophen (TYLENOL) tablet 650 mg  650 mg Oral Q6H PRN  
 sodium chloride (NS) flush 5-10 mL  5-10 mL IntraVENous PRN  
 albuterol (PROVENTIL VENTOLIN) nebulizer solution 2.5 mg  2.5 mg Nebulization Q4H PRN  
 alum-mag hydroxide-simeth (MYLANTA) oral suspension 30 mL  30 mL Oral Q4H PRN  
 ascorbic acid (vitamin C) (VITAMIN C) tablet 500 mg  500 mg Oral DAILY  aspirin delayed-release tablet 81 mg  81 mg Oral DAILY  bisacodyl (DULCOLAX) suppository 10 mg  10 mg Rectal DAILY PRN  
 budesonide (PULMICORT) 500 mcg/2 ml nebulizer suspension  500 mcg Nebulization BID RT And  
 albuterol (PROVENTIL VENTOLIN) nebulizer solution 2.5 mg  2.5 mg Nebulization Q6HWA RT  
 carvedilol (COREG) tablet 6.25 mg  6.25 mg Oral BID WITH MEALS  enoxaparin (LOVENOX) injection 100 mg  100 mg SubCUTAneous Q24H  
 escitalopram oxalate (LEXAPRO) tablet 20 mg  20 mg Oral DAILY  influenza vaccine 2018-19 (6 mos+)(PF) (FLUARIX QUAD/FLULAVAL QUAD) injection 0.5 mL  0.5 mL IntraMUSCular PRIOR TO DISCHARGE  isosorbide mononitrate ER (IMDUR) tablet 30 mg  30 mg Oral DAILY  ondansetron (ZOFRAN ODT) tablet 4 mg  4 mg Oral Q6H PRN  
 oxyCODONE IR (ROXICODONE) tablet 5-10 mg  5-10 mg Oral Q4H PRN  pantoprazole (PROTONIX) tablet 40 mg  40 mg Oral ACB  polyethylene glycol (MIRALAX) packet 17 g  17 g Oral DAILY PRN  
 simvastatin (ZOCOR) tablet 20 mg  20 mg Oral QHS  tiotropium (SPIRIVA) inhalation capsule 18 mcg  18 mcg Inhalation DAILY  traMADol (ULTRAM) tablet 50 mg  50 mg Oral Q4H PRN Review of Systems:Denies chest pain, shortness of breath, cough, headache,  abdominal pain, dysurea, calf pain. Pertinent positives are as noted in the medical records and unremarkable otherwise. Visit Vitals  /69  Pulse 66  Temp 98.5 °F (36.9 °C)  Resp 16  SpO2 99% Physical Exam:  
General: Alert and age appropriately oriented. No acute cardio respiratory distress. HEENT: Normocephalic,no scleral icterus Oral mucosa moist without cyanosis; poor visual acuity Lungs: Clear to auscultation  Bilaterally. Dec bases Respiration even and unlabored Heart: Regular rate and rhythm, S1, S2 No  murmurs, clicks, rub or gallops Abdomen: Soft, non-tender, nondistended. Bowel sounds present. No organomegaly. Genitourinary: Benign . Neuromuscular:  
 
 Grossly no focal motor deficits noted. Moves ankles. Skin/extremity: No rashes, no erythema. No calf tenderness BLE Wound covered. Trace pedal edema Functional Assessment: 
Gross Assessment AROM: Generally decreased, functional (09/18/18 1600) Strength: Generally decreased, functional (09/18/18 1600) Balance Sitting - Static: Good (unsupported) (09/18/18 1600) Sitting - Dynamic: Good (unsupported) (09/18/18 1600) Standing - Static: Good (09/18/18 1600) Standing - Dynamic : Impaired (09/18/18 1600) Sen Hunt Fall Risk Assessment: 
E[place]a [place] Risk Mobility: Ambulates or transfers with assist devices or assistance (09/19/18 0719) Mobility Interventions: Patient to call before getting OOB;Utilize walker, cane, or other assistive device (09/19/18 0719) Mentation: Alert, oriented x 3 (09/19/18 0719) Medication: Patient receiving anticonvulsants, sedatives(tranquilizers), psychotropics or hypnotics, hypoglycemics, narcotics, sleep aids, antihypertensives, laxatives, or diuretics (09/19/18 0719) Medication Interventions: Patient to call before getting OOB (09/19/18 0719) Elimination: Needs assistance with toileting (09/19/18 0719) Elimination Interventions: Call light in reach; Patient to call for help with toileting needs; Toileting schedule/hourly rounds (09/19/18 0719) Prior Fall History: Before admission in past 12 months _home or previous inpatient care) (09/19/18 0719) History of Falls Interventions: Door open when patient unattended (09/19/18 0719) Total Score: 4 (09/19/18 0719) Standard Fall Precautions: Yes (09/18/18 2042) High Fall Risk: Yes (09/19/18 0719) Speech Assessment: 
    
 
Ambulation: 
Gait Distance (ft): 170 Feet (ft) (09/18/18 1600) Assistive Device: Walker, rollator (09/18/18 1600) Rail Use: Left  (09/18/18 1600) Labs/Studies: 
Recent Results (from the past 72 hour(s)) GLUCOSE, POC Collection Time: 09/16/18 11:16 AM  
Result Value Ref Range Glucose (POC) 156 (H) 65 - 100 mg/dL GLUCOSE, POC Collection Time: 09/16/18  4:52 PM  
Result Value Ref Range Glucose (POC) 128 (H) 65 - 100 mg/dL GLUCOSE, POC Collection Time: 09/16/18  8:58 PM  
Result Value Ref Range Glucose (POC) 210 (H) 65 - 100 mg/dL PROTHROMBIN TIME + INR Collection Time: 09/17/18  5:39 AM  
Result Value Ref Range Prothrombin time 18.2 (H) 11.5 - 14.5 sec INR 1.6 METABOLIC PANEL, BASIC Collection Time: 09/17/18  5:39 AM  
Result Value Ref Range Sodium 141 136 - 145 mmol/L Potassium 4.6 3.5 - 5.1 mmol/L Chloride 101 98 - 107 mmol/L  
 CO2 34 (H) 21 - 32 mmol/L Anion gap 6 (L) 7 - 16 mmol/L Glucose 79 65 - 100 mg/dL BUN 54 (H) 8 - 23 MG/DL  Creatinine 1.69 (H) 0.6 - 1.0 MG/DL  
 GFR est AA 38 (L) >60 ml/min/1.73m2 GFR est non-AA 32 (L) >60 ml/min/1.73m2 Calcium 9.1 8.3 - 10.4 MG/DL  
GLUCOSE, POC Collection Time: 09/17/18  7:13 AM  
Result Value Ref Range Glucose (POC) 87 65 - 100 mg/dL GLUCOSE, POC Collection Time: 09/17/18 11:11 AM  
Result Value Ref Range Glucose (POC) 118 (H) 65 - 100 mg/dL GLUCOSE, POC Collection Time: 09/17/18  4:23 PM  
Result Value Ref Range Glucose (POC) 129 (H) 65 - 100 mg/dL GLUCOSE, POC Collection Time: 09/17/18  9:01 PM  
Result Value Ref Range Glucose (POC) 149 (H) 65 - 100 mg/dL PROTHROMBIN TIME + INR Collection Time: 09/18/18  6:10 AM  
Result Value Ref Range Prothrombin time 19.4 (H) 11.5 - 14.5 sec INR 1.7 GLUCOSE, POC Collection Time: 09/18/18  6:50 AM  
Result Value Ref Range Glucose (POC) 97 65 - 100 mg/dL GLUCOSE, POC Collection Time: 09/18/18 11:24 AM  
Result Value Ref Range Glucose (POC) 150 (H) 65 - 100 mg/dL GLUCOSE, POC Collection Time: 09/18/18  3:49 PM  
Result Value Ref Range Glucose (POC) 132 (H) 65 - 100 mg/dL GLUCOSE, POC Collection Time: 09/18/18  8:42 PM  
Result Value Ref Range Glucose (POC) 164 (H) 65 - 100 mg/dL PROTHROMBIN TIME + INR Collection Time: 09/19/18  6:38 AM  
Result Value Ref Range Prothrombin time 20.1 (H) 11.5 - 14.5 sec INR 1.8 METABOLIC PANEL, BASIC Collection Time: 09/19/18  6:38 AM  
Result Value Ref Range Sodium 141 136 - 145 mmol/L Potassium 4.5 3.5 - 5.1 mmol/L Chloride 102 98 - 107 mmol/L  
 CO2 34 (H) 21 - 32 mmol/L Anion gap 5 (L) 7 - 16 mmol/L Glucose 85 65 - 100 mg/dL BUN 47 (H) 8 - 23 MG/DL Creatinine 1.65 (H) 0.6 - 1.0 MG/DL  
 GFR est AA 40 (L) >60 ml/min/1.73m2 GFR est non-AA 33 (L) >60 ml/min/1.73m2 Calcium 9.2 8.3 - 10.4 MG/DL  
GLUCOSE, POC Collection Time: 09/19/18  6:58 AM  
Result Value Ref Range Glucose (POC) 90 65 - 100 mg/dL Assessment: Problem List as of 9/19/2018  Date Reviewed: 8/31/2018 Codes Class Noted - Resolved Coagulopathy ([facility] INR >4 on admission 9/7/18 ICD-10-CM: D68.9 ICD-9-CM: 286.9  9/8/2018 - Present Traumatic hematoma of left knee ICD-10-CM: J28.35QI ICD-9-CM: 924.11  9/8/2018 - Present Debility ICD-10-CM: R53.81 ICD-9-CM: 799.3  9/8/2018 - Present Anemia ICD-10-CM: D64.9 ICD-9-CM: 285.9  9/7/2018 - Present Chronic respiratory failure with hypoxia (HCC) (Chronic) ICD-10-CM: J96.11 
ICD-9-CM: 518.83, 799.02  9/7/2018 - Present Acute kidney injury superimposed on chronic kidney disease ([facility] ICD-10-CM: N17.9, N18.9 ICD-9-CM: 866.00, 585.9  9/7/2018 - Present Encounter for immunization ICD-10-CM: L75 ICD-9-CM: V03.89  8/21/2018 - Present Obesity, morbid ([facility] ICD-10-CM: E66.01 
ICD-9-CM: 278.01  6/8/2018 - Present * (Principal)Physical debility ICD-10-CM: R53.81 ICD-9-CM: 799.3  5/2/2018 - Present Closed nondisplaced fracture of shaft of fifth metacarpal bone of left hand ICD-10-CM: F78.633L ICD-9-CM: 815.03  4/28/2018 - Present Subdural hematoma (HCC) ICD-10-CM: I62.00 ICD-9-CM: 432.1  4/27/2018 - Present Long term (current) use of anticoagulants (Chronic) ICD-10-CM: Z79.01 
ICD-9-CM: V58.61  4/19/2018 - Present Dysthymia ICD-10-CM: F34.1 ICD-9-CM: 300.4  4/5/2018 - Present Type 2 diabetes mellitus with nephropathy (HCC) (Chronic) ICD-10-CM: E11.21 
ICD-9-CM: 250.40, 583.81  12/18/2017 - Present Pulmonary hypertension (HCC) (Chronic) ICD-10-CM: I27.20 ICD-9-CM: 416.8  6/15/2016 - Present S/P AVR (aortic valve replacement) (Chronic) ICD-10-CM: Z95.2 ICD-9-CM: V43.3  Unknown - Present Overview Signed 2/23/2016 11:04 AM by Nils Caballero Mechanical/Artific Cardiomyopathy ([facility] (Chronic) ICD-10-CM: I42.9 ICD-9-CM: 425.4  Unknown - Present Osteopenia ICD-10-CM: M85.80 ICD-9-CM: 733.90  Unknown - Present HLD (hyperlipidemia) (Chronic) ICD-10-CM: Z02.0 ICD-9-CM: 272.4  Unknown - Present Osteoarthritis ICD-10-CM: M19.90 ICD-9-CM: 715.90  Unknown - Present  
   
 ICD (implantable cardioverter-defibrillator) in place ICD-10-CM: Z95.810 ICD-9-CM: V45.02  10/2/2014 - Present Overview Signed 10/2/2014  4:58 PM by Kayli Escobar III [employer] single-chamber ICD implantation 10/20/14 Diabetes mellitus type 2, diet-controlled (HCC) (Chronic) ICD-10-CM: E11.9 ICD-9-CM: 250.00  8/28/2014 - Present COPD (chronic obstructive pulmonary disease) (HCC) (Chronic) ICD-10-CM: J44.9 ICD-9-CM: 640  4/2/2014 - Present REJI (obstructive sleep apnea)-cpap (Chronic) ICD-10-CM: G47.33 
ICD-9-CM: 327.23  4/2/2014 - Present CKD (chronic kidney disease) stage 3, GFR 30-59 ml/min (Chronic) ICD-10-CM: N18.3 ICD-9-CM: 585.3  7/10/2013 - Present Anticoagulated on Coumadin (Chronic) ICD-10-CM: Z51.81, Z79.01 
ICD-9-CM: V58.83, V58.61  7/9/2013 - Present Overview Signed 7/9/2013  4:16 PM by Andrey Douglas NP  
  S/P AVR 
  
  
   
 CAD (coronary artery disease) (Chronic) ICD-10-CM: I25.10 ICD-9-CM: 414.00  1/20/2013 - Present Overview Signed 5/20/2014 10:05 AM by Roseann Alba NP  
  5/8/14 PCI LAD with stent placed Chronic combined systolic and diastolic heart failure (HCC) (Chronic) ICD-10-CM: I50.42 
ICD-9-CM: 428.42  1/20/2013 - Present Overview Addendum 4/28/2018  4:53 AM by Dayana Madera MD  
  5/8/14 ECHO:  EF 10-15% 12/2017:  EF 25-30% Essential hypertension, benign (Chronic) ICD-10-CM: I10 
ICD-9-CM: 401.1  1/20/2013 - Present MDS (myelodysplastic syndrome) (HCC) (Chronic) ICD-10-CM: D46.9 ICD-9-CM: 238.75  12/17/2011 - Present Overview Addendum 8/29/2013 11:06 AM by Lien Garcia Procrit started in August, 2011 12/18/11 Procrit weekly and Iron stores. 5-12 12-13-12 good response to 3 weekly procrit did not need it last time 3-7-13 Pt doing well. Just wanted a \"check-up. \" Responding to Procrit every three weeks. 8-29-13 patient has missed some injections on recently restarted hemoglobin only issue , takes oral iron iron stores each time Iron deficiency anemia due to chronic blood loss (Chronic) ICD-10-CM: D50.0 ICD-9-CM: 280.0  7/29/2009 - Present RESOLVED: Routine general medical examination at a health care facility ICD-10-CM: Z00.00 ICD-9-CM: V70.0  12/16/2016 - 4/27/2018 Assessment: Profound Physical Debility s/p admitted with moderately severe anemia 
   
Continue daily physician medical management: 
Pneumonia prophylaxis- Incentive spirometer every hour while awake 
   
Chronic hypoxic respir failure; cont RT txs. On Trilogy at [facility]. Monitor O2 sats and cont O2 supplementation. Currently on 3L per NC 
   
Insomnia; add Trazadone, or dtg can bring in her Melatonin 
   
DVT risk / DVT Prophylaxis- Will require daily physician exam to assess for signs and symptoms as patient is at increased risk for of thromboembolism. Mobilization as tolerated. Intermittent pneumatic compression devices when in bed Thigh-high or knee-high thromboembolic deterrent hose when out of bed. No further anticoag is needed; on therapeutic Lovenox while bridging to coumadin 
-9/12 inr down 1.3 ;pharmacy dosing; 9/13 not changed ; now on 5mg coumadin; cont lovenox until therapeutic 
-9/14 INR still subtherap at 1.3 , thus continue sq Lovenox; pharmacy dosing; 9/17 INR 1.6; pharm dosing; still on 5mg, likely need to increase; would like INR 2.5 to 3 
-8/19 INR 1.7 still subtherapeutic; on 5mg; 9/19 INR 1.8, cont coumadin and lovenox; ? ??inc coumadin to 6mg; pharm is dosing 
   
S/p mech AVR; chronic coumadin therapy but such hi risk of bleeding due to multiple falls.   
   
CHF; cont aldactone, coreg, imdur and lasix; 9/13 pt was on Lasix 80mg bid during acute stay, have dec to 40 mg bid; states she takes 20mg at home; monitor closely for signs of chf exacerb.  
-9/14 compensated; monitor as Lasix has decreased to 40 bid, home dose 20 daily; FELIZ on CKD thus decreasing Lasix; change to 40 daily  
-9/17 no peripheral edema, no s/s of CHF, kidney fxn improving; cont 40mg for now of Lasix; bmp 9/19;improving but mild periph edema, lungs clear. Asking RN to weigh.  
   
Pain Control: ongoing significant pain in knees which is stable and controlled by PRN meds. Will require regular pain assessment and comprenhensive pain management,  
   
Wound Care: Monitor wound status daily per staff and physician. At risk for failure. Will require 24/7 rehab nursing. Keep wound clean and dry, Reinforce dressing PRN and Ice to area for comfort 
   
Hypertension - BP controlled, fluctuating, managed medically.  
   
Acute on chronic anemia; possible MDS followed by Dr Jose Crump. Monitor closely, sp transfusion. Cont epocrit. Cont ferrous sulfate and vit C 
   
Depression; cont Lexapro. Depression regarding medical cond and fear of losing independence 
   
Diabetes mellitus - controlled. poor glycemic control. Will require daily, close FSG monitoring and medication adjustment to optimize glycemic control in setting of acute illness and hospitalization.  
-diet controlled so far 
   
Urinary retention/ neurogenic bladder - schedule voids q6-8 hrs. Check post-void residual as needed; In and Out catheterize if post-void residual is more than 400 cc. 
-having urinary incont; will try timed voids due to urgency 
   
CKD stg 3; creat 2.05 on admission to rehab. Was 1.55 yesterday. Recheck in a.m and review meds contributing. 
-9/12 creat is increasing; dec lasix; recheck pending 9/13.  2.48 Today 9/14 creat 2.35, BUN 74; slowly improving with drop in Lasix/CHF well compensated; DAILY BMP 
-creatinine continues to improve with decreased Lasix; no s/s of chf, no peripheral edema and lungs clear; creat 1.69 from 1.96 yest. BUN down to 54 from 65 yesterday. Close to baseline? 
-recheck 9/19. Today no edema, no increased SOB to suggest fluid overload 
-9/19 weigh today. Minimal pedal edema; no sob. Creat and bun improving still 
   
bowel program - add bowel program prn 
   
GERD - add PPI. At times may need additional antacids, Maalox prn. Previous hx of GIB;HH stable 
   
  
  
 
 
Time spent was 25 minutes with over 1/2 in direct patient care/examination, consultation and coordination of care. Signed By: Baldev Jenkins MD   
 September 19, 2018

## 2018-09-19 NOTE — PROGRESS NOTES
PHYSICAL THERAPY DAILY NOTE Time In: 1001 Time Out: 1045 Patient Seen For: AM;Gait training;Patient education; Therapeutic exercise;Transfer training; Other (see progress notes) Subjective: patient reporting she feels a little better each day. Reports she is not sure if she will go home this week Objective:Vital Signs: 
Patient Vitals for the past 12 hrs: 
 Temp Pulse Resp BP SpO2  
09/19/18 1140 - - - - 96 % 09/19/18 0748 98.5 °F (36.9 °C) 66 16 133/69 99 % 09/19/18 0603 - - - - 94 % Pain level:4 out of 10 Pain location:right shoulder when putting right hand on rollator Pain interventions:Pain medication per RN, rest, positioning,OT addressing right shoulder Patient education:transfer training, gait training, fall precautions, activity pacing, body mechanics, rollator parts management, breathing techniques during transfers, Patient verbalizing understanding and demonstrating  understanding of patient education. Recommend follow up education. Interdisciplinary Communication:spoke with OT regarding progress towards goals and functional status Other (comment) (Low Vision, Fall Risk) GROSS ASSESSMENT Daily Assessment NA  
 
 
BED/MAT MOBILITY Daily Assessment Rolling Right : 0 (Not tested) Rolling Left : 0 (Not tested) Supine to Sit : 0 (Not tested) Sit to Supine : 0 (Not tested) TRANSFERS Daily Assessment Cues for rollator parts management during sit<>stand Transfer Type: SPT without device Transfer Assistance : 6 (Modified independent) Sit to Stand Assistance: Modified independent Car Transfers: Not tested GAIT Daily Assessment Amount of Assistance: 5 (Supervision/setup) Distance (ft): 200 Feet (ft) (200ft x 2 with 7 min sitting rest break between attempts) Assistive Device: Walker, rollator Gait training with cues for controlling breathing pattern with gait speed. STEPS or STAIRS Daily Assessment  Steps/Stairs Ambulated (#): 0 
 Level of Assist : 0 (Not tested) BALANCE Daily Assessment Sitting - Static: Good (unsupported) Sitting - Dynamic: Good (unsupported) Standing - Static: Good Standing - Dynamic : Impaired WHEELCHAIR MOBILITY Daily Assessment Curbs/Ramps Assist Required (FIM Score): 0 (Not tested) Wheelchair Setup Assist Required : 0 (Not tested) LOWER EXTREMITY EXERCISES Daily Assessment Extremity: Both Exercise Type #1: Other (comment) (LE motomed x 10 mins at level 3) Level of Assist: Supervision Assessment: Gait distance and ability to control breathing pattern improving on a daily basis. 02 sat during long distance gait improving Patient returned to room at end of treatment and remained up in recliner with LEs elevated and with needs in reach. 02 at 3 lpm 
 
Plan of Care: Continue with POC and progress as tolerated. Sapna Adame, PT 
9/19/2018

## 2018-09-19 NOTE — PROGRESS NOTES
09/19/18 0830 Bathing Functional Level 5 Body Parts Patient Bathed Thigh, right;Thigh, left;Madi area; Lower leg and foot, right; Lower leg and foot, left; Chest;Buttocks;Arm, right;Arm, left; Abdomen Comments Patient stands unsupported in shower to bathe madi area with supervision, use of bathing mitt and long handled sponge Upper Body Dressing/Undressing Functional Level 3 Items Applied/Steps Completed Button shirt (4 steps) Comments Assist to help around back and button Lower Body Dressing/Undressing Functional Level 5 Items Applied/Steps Completed Elastic waist pants (3 steps); Underpants (3 steps); Sock, right (1 step); Sock, left (1 step) Comments Setup/supervision seated/standing EOB to manage pants over hips Bed/Chair/Wheelchair Transfers Sit to Stand Assistance Modified independent Tub/Shower Transfer [place] Tub or Shower Type Shower Tub/Shower Transfer Score 5 Comments Ambulating without AD <> shower with SBA  
 
 
S: \"That towel exercise made my [R] shoulder feel better than it has in a while. \" Agreeable to therapy. Focus of session was on ADL and functional mobility, followed by BUE therapeutic exercises in therapy gym. Patient was able to ambulate ~10 feet x 2 without AD, supervision. Pain not indicated. Patient completed 1 set x 10 reps of RUE AAROM towel slide exercises standing at tabletop in therapy gym with SBA, stabilizing through LUE on tabletop. Patient completed shoulder flexion/extension, horizontal adduction/abduction, and clockwise/counter-clockwise circles. Patient tolerated limited ROM however no c/o increased pain. Patient completed 1 set x 10 reps of LUE therapeutic exercises seated in wheelchair with 1.5# cuff weight donned. Patient completed shoulder flexion, shoulder abduction, scapular protraction/retraction, and elbow flexion exercises.  
 
Collaborated with PT, May Pierce and confirmed patient is on track to reach goals as documented in the care plan. Patient tolerated session well, but activity tolerance, balance, functional mobility, strength, coordination are still below baseline and require skilled facilitation to successfully and safely complete ADL's and transfers. Patient ended session in wheelchair with PT, Kevin Orlando.   
 
Renée Dasilva, OTR/L

## 2018-09-19 NOTE — PROGRESS NOTES
Subjective \"I am in a better mood today! \" Activity Sorting activity with 1 lb wrist weights Strength/Endurance Patient appeared to be feeling better which had her more motivated to participate. Balance Sit<->stand;  S with rollator Social Interaction Patient was friendly and in good spirits. She was back to her jovial, joking self. Cognitive A&O X4 Comments Patient was on 3 liter O2 via nasal canula. She requested to stay out in the gym during there break between therapies.    
 
Vicki Rivera, CTRS

## 2018-09-19 NOTE — PROGRESS NOTES
Problem: Falls - Risk of 
Goal: *Absence of Falls Document Sarina Kappa Fall Risk and appropriate interventions in the flowsheet. Outcome: Progressing Towards Goal 
Fall Risk Interventions: 
Mobility Interventions: Patient to call before getting OOB, Utilize walker, cane, or other assistive device Medication Interventions: Patient to call before getting OOB Elimination Interventions: Call light in reach, Patient to call for help with toileting needs, Toileting schedule/hourly rounds History of Falls Interventions: Door open when patient unattended

## 2018-09-19 NOTE — PROGRESS NOTES
Patient complained of pain oxycodone 10 mg po given  At bedtime. Patient appeared to rest quietly overnight. No change in patient condition during nursing rounds

## 2018-09-19 NOTE — PROGRESS NOTES
PHYSICAL THERAPY DAILY NOTE Time In: 1430 Time Out: 7593 Patient Seen For: PM;Gait training;Patient education; Therapeutic exercise;Transfer training; Other (see progress notes) Subjective: patient reporting she is tired this PM and cannot walk as far due to fatigue. Reports all of the therapy in the AM made her tired. Objective:Vital Signs: 
Patient Vitals for the past 12 hrs: 
 Temp Pulse Resp BP SpO2  
09/19/18 1140 - - - - 96 % 09/19/18 0748 98.5 °F (36.9 °C) 66 16 133/69 99 % 09/19/18 0603 - - - - 94 % Pain level:4 out of 10 Pain location:right shoulder when putting right hand on rollator Pain interventions:Pain medication per RN, rest, positioning,OT addressing right shoulder Patient education:,transfer training, gait training, fall precautions, activity pacing,body mechanics, rollator parts management, energy conservation, breathing techniques during functional mobility. Patient verbalizing understanding and demonstrating understanding of patient education. Recommend follow up education. Interdisciplinary Communication:NA Other (comment) (Low Vision, Fall Risk) GROSS ASSESSMENT Daily Assessment NA  
 
 
BED/MAT MOBILITY Daily Assessment Rolling Right : 0 (Not tested) Rolling Left : 0 (Not tested) Supine to Sit : 0 (Not tested) Sit to Supine : 0 (Not tested) TRANSFERS Daily Assessment Occasional cues for rollator set up Transfer Type: SPT with walker Transfer Assistance : 6 (Modified independent) Sit to Stand Assistance: Modified independent Car Transfers: Not tested GAIT Daily Assessment Amount of Assistance: 5 (Supervision/setup) Distance (ft): 150 Feet (ft) Assistive Device: Walker, rollator Gait training with cues for controlling breathing pattern with gait speed. STEPS or STAIRS Daily Assessment Steps/Stairs Ambulated (#): 0 Level of Assist : 0 (Not tested) BALANCE Daily Assessment Sitting - Static: Good (unsupported) Sitting - Dynamic: Good (unsupported) Standing - Static: Good Standing - Dynamic : Impaired WHEELCHAIR MOBILITY Daily Assessment Curbs/Ramps Assist Required (FIM Score): 0 (Not tested) Wheelchair Setup Assist Required : 0 (Not tested) LOWER EXTREMITY EXERCISES Daily Assessment Increased time and effort to complete with multiple and frequent rest breaks. Cues for breathing pattern and correct form Extremity: Both Exercise Type #1: Seated lower extremity strengthening Sets Performed: 2 Reps Performed: 10 Level of Assist: Minimal assistance SEATED EXERCISES Sets Reps Comments Ankle Pumps 1 20 [address] 2 10 Hip Adduction/Ball Squeeze 1 20 Hip Abduction with green Theraband 1 20 Hamstring Curls with green Theraband 2 10 Hip Extension with green Theraband 2 10 Assessment: Tendency to fatigue in PM after full morning of therapy with a slight decrease in gait distance and slight increase in difficulty keeping 02 sat from dropping Patient returned to room at end of treatment and remained up in recliner with LEs elevated and with needs in reach. Plan of Care: Continue with POC and progress as tolerated. Lieutenant Thompson, PT 
9/19/2018

## 2018-09-19 NOTE — PROGRESS NOTES
Late note for 9/18/18:   
Time:  10:45 - 11:15 am 
Subjective \"I'll be ok, I'm just having a \"sad\" day. \" Activity Questions and answers. Strength/Endurance Patient c/o being tired and thankful for the rest prior RT session. She was eager to get back started with her afternoon  therapies. Balance Sit<->stand:  S with rollator Social Interaction Patient appeared down at the beginning of the session but cheered up through conversation and as time went on. She mentioned that she was worried that she has started the \"cycle of hospitalizations, with this being the second time in 6 months. \"  
Cognitive A&O X4 Comments Patient was on 3 liter O2 via nasal canula with no c/o or signs of shortness of breath. She was handed off to Raj painter PT at the end of the session.   
 
PEARL Andres

## 2018-09-19 NOTE — PROGRESS NOTES
Warfarin dosing per pharmacist 
 
Mila Partida Aries Puga is a 77 y.o. female. Indication:  S/p AVR Goal INR:  2-3 Home dose:  5 mg Sun and 2.5 mg all other days  (Weekly dose of 20 mg) Risk factors or significant drug interactions:  none Other anticoagulants:  Lovenox bridge Daily Monitoring Date  INR     Warfarin dose  HGB              Notes 9/8  3.4  Hold   9.3 
9/9  2.8  Hold   9.0  
9/10  2.0  Hold   9.6 9/11  1.5  5 mg    9.5 9/12  1.3  5 mg   10.5 9/13  1.3  5 mg   --- 
9/14  1.3  (15 mg)*  ---  *patient mistakenly given 7.5 mg dose X 2, Mara Brennan entered 9/15  1.4  Hold   --- 
9/16  1.6  5 mg   --- 
9/17  1.6  5 mg   --- 
9/17  1.7  5 mg   --- 
9/18  1.8  7.5 mg   ---- Pharmacy has been consulted to dose warfarin for this patient due to a history of AVR (mechanical). The INR goal per the patient's home warfarin clinic is 2-3. Patient initially presented with a supratherapeutic INR that has trended down. INR to 1.8. Will give 7.5 mg this evening and reassess for possible increase tomorrow. Okay to continue lovenox bridge therapy until INR therapeutic. Thank you, Bhumi Hooker, Pharm. D. Clinical Pharmacist 
[phone]

## 2018-09-20 ENCOUNTER — HOME HEALTH ADMISSION (OUTPATIENT)
Dept: HOME HEALTH SERVICES | Facility: HOME HEALTH | Age: 68
End: 2018-09-20
Payer: MEDICARE

## 2018-09-20 LAB
GLUCOSE BLD STRIP.AUTO-MCNC: 147 MG/DL (ref 65–100)
INR PPP: 2
PROTHROMBIN TIME: 21.8 SEC (ref 11.5–14.5)

## 2018-09-20 PROCEDURE — 97110 THERAPEUTIC EXERCISES: CPT

## 2018-09-20 PROCEDURE — 74011000250 HC RX REV CODE- 250: Performed by: PHYSICAL MEDICINE & REHABILITATION

## 2018-09-20 PROCEDURE — 74011250637 HC RX REV CODE- 250/637: Performed by: PHYSICAL MEDICINE & REHABILITATION

## 2018-09-20 PROCEDURE — 77010033678 HC OXYGEN DAILY

## 2018-09-20 PROCEDURE — 97535 SELF CARE MNGMENT TRAINING: CPT

## 2018-09-20 PROCEDURE — 36415 COLL VENOUS BLD VENIPUNCTURE: CPT

## 2018-09-20 PROCEDURE — 65310000000 HC RM PRIVATE REHAB

## 2018-09-20 PROCEDURE — 82962 GLUCOSE BLOOD TEST: CPT

## 2018-09-20 PROCEDURE — 94760 N-INVAS EAR/PLS OXIMETRY 1: CPT

## 2018-09-20 PROCEDURE — 99232 SBSQ HOSP IP/OBS MODERATE 35: CPT | Performed by: PHYSICAL MEDICINE & REHABILITATION

## 2018-09-20 PROCEDURE — 85610 PROTHROMBIN TIME: CPT

## 2018-09-20 PROCEDURE — 94640 AIRWAY INHALATION TREATMENT: CPT

## 2018-09-20 PROCEDURE — 97530 THERAPEUTIC ACTIVITIES: CPT

## 2018-09-20 PROCEDURE — 97116 GAIT TRAINING THERAPY: CPT

## 2018-09-20 RX ORDER — CARVEDILOL 6.25 MG/1
6.25 TABLET ORAL 2 TIMES DAILY WITH MEALS
Qty: 60 TAB | Refills: 3 | Status: SHIPPED | OUTPATIENT
Start: 2018-09-20 | End: 2019-01-21 | Stop reason: SDUPTHER

## 2018-09-20 RX ORDER — TRAZODONE HYDROCHLORIDE 50 MG/1
25-50 TABLET ORAL
Qty: 60 TAB | Refills: 2 | Status: SHIPPED | OUTPATIENT
Start: 2018-09-20 | End: 2019-01-01 | Stop reason: SDUPTHER

## 2018-09-20 RX ORDER — FUROSEMIDE 40 MG/1
40 TABLET ORAL DAILY
Qty: 30 TAB | Refills: 6 | Status: SHIPPED | OUTPATIENT
Start: 2018-09-20 | End: 2019-01-01 | Stop reason: SDUPTHER

## 2018-09-20 RX ORDER — WARFARIN 6 MG/1
6 TABLET ORAL EVERY EVENING
Qty: 30 TAB | Refills: 2 | Status: ON HOLD | OUTPATIENT
Start: 2018-09-20 | End: 2018-10-06

## 2018-09-20 RX ADMIN — FUROSEMIDE 40 MG: 40 TABLET ORAL at 09:07

## 2018-09-20 RX ADMIN — OXYCODONE HYDROCHLORIDE 10 MG: 5 TABLET ORAL at 18:14

## 2018-09-20 RX ADMIN — TRAZODONE HYDROCHLORIDE 50 MG: 50 TABLET ORAL at 21:56

## 2018-09-20 RX ADMIN — ASPIRIN 81 MG: 81 TABLET, COATED ORAL at 09:07

## 2018-09-20 RX ADMIN — BUDESONIDE 500 MCG: 0.5 INHALANT RESPIRATORY (INHALATION) at 05:30

## 2018-09-20 RX ADMIN — ALBUTEROL SULFATE 2.5 MG: 2.5 SOLUTION RESPIRATORY (INHALATION) at 12:36

## 2018-09-20 RX ADMIN — OXYCODONE HYDROCHLORIDE 10 MG: 5 TABLET ORAL at 09:21

## 2018-09-20 RX ADMIN — BUDESONIDE 500 MCG: 0.5 INHALANT RESPIRATORY (INHALATION) at 17:25

## 2018-09-20 RX ADMIN — ALBUTEROL SULFATE 2.5 MG: 2.5 SOLUTION RESPIRATORY (INHALATION) at 05:30

## 2018-09-20 RX ADMIN — CARVEDILOL 6.25 MG: 6.25 TABLET, FILM COATED ORAL at 16:46

## 2018-09-20 RX ADMIN — OXYCODONE HYDROCHLORIDE AND ACETAMINOPHEN 500 MG: 500 TABLET ORAL at 09:07

## 2018-09-20 RX ADMIN — WARFARIN SODIUM 6 MG: 5 TABLET ORAL at 18:09

## 2018-09-20 RX ADMIN — ISOSORBIDE MONONITRATE 30 MG: 30 TABLET, EXTENDED RELEASE ORAL at 09:07

## 2018-09-20 RX ADMIN — OXYCODONE HYDROCHLORIDE 10 MG: 5 TABLET ORAL at 21:55

## 2018-09-20 RX ADMIN — ESCITALOPRAM OXALATE 20 MG: 10 TABLET ORAL at 09:07

## 2018-09-20 RX ADMIN — CARVEDILOL 6.25 MG: 6.25 TABLET, FILM COATED ORAL at 08:04

## 2018-09-20 RX ADMIN — TIOTROPIUM BROMIDE 18 MCG: 18 CAPSULE ORAL; RESPIRATORY (INHALATION) at 05:31

## 2018-09-20 RX ADMIN — PANTOPRAZOLE SODIUM 40 MG: 40 TABLET, DELAYED RELEASE ORAL at 05:34

## 2018-09-20 RX ADMIN — SIMVASTATIN 20 MG: 20 TABLET, FILM COATED ORAL at 21:56

## 2018-09-20 RX ADMIN — ALBUTEROL SULFATE 2.5 MG: 2.5 SOLUTION RESPIRATORY (INHALATION) at 17:25

## 2018-09-20 NOTE — PROGRESS NOTES
Ms. Godinez Lie sitting on side of bed, voices no needs at this time. Respiration even and non labored. Pt on 3 L N/C. She  Denies sob, pain. Dressing to LLE with old bloody drainage. Wound with dark tissue, maroon/purple color. .Pt has a band aid to upper Right thigh. Pt states she scratch herself. Ms is A/O x 4. Call light with in reach.

## 2018-09-20 NOTE — PROGRESS NOTES
Warfarin dosing per pharmacist 
 
Saul Velasquez Jessenia Kang is a 77 y.o. female. Indication:  S/p AVR Goal INR:  2-3 Home dose:  5 mg Sun and 2.5 mg all other days  (Weekly dose of 20 mg) Risk factors or significant drug interactions:  none Other anticoagulants:  Lovenox bridge Daily Monitoring Date  INR     Warfarin dose  HGB              Notes 9/8  3.4  Hold   9.3 
9/9  2.8  Hold   9.0  
9/10  2.0  Hold   9.6 9/11  1.5  5 mg    9.5 9/12  1.3  5 mg   10.5 9/13  1.3  5 mg   --- 
9/14  1.3  (15 mg)*  ---  *patient mistakenly given 7.5 mg dose X 2, Sobeida Mckenna entered 9/15  1.4  Hold   --- 
9/16  1.6  5 mg   --- 
9/17  1.6  5 mg   --- 
9/17  1.7  5 mg   --- 
9/18  1.8  7.5 mg   --- 
9/19  2.0  6 mg   --- Pharmacy has been consulted to dose warfarin for this patient due to a history of AVR (mechanical). The INR goal per the patient's home warfarin clinic is 2-3. Patient initially presented with a supratherapeutic INR that has trended down. INR to 2.0. Patient has had higher warfarin requirements compared to above home dosing. Change to 6 mg daily currently. Following daily INR. Thank you, Juan Jose Khan, Pharm. D. Clinical Pharmacist 
[phone]

## 2018-09-20 NOTE — PROGRESS NOTES
End Of Shift Functional Summary, Nursing TOILETING:   
Does patient need assist with adjusting pants up or down and/or pericare? yes If yes, please indicate what the patient needs help with:  Pulling up   (i.e. pants up, pants down, pericare) Pt uses walker. Does the patient require extra time? yes Does the patient require standby assistance? yes Does the patient require contact guard assistance? no 
Does the patient require more than one staff member for assistance? no 
 
TOILET TRANSFER:   
Pt requires minimal assistance. Pt uses walker. Does the patient require extra time? yes Does the patient require contact guard assistance? no 
Does the patient require more than one staff member for assistance? yes BLADDER:   
Pt does not have a medina catheter that staff manages. Pt does not take medication. If so, please indicate which medication:   
Pt is continent. of bladder and voids in bedside commode  Pt requires staff to empty device Pt has had 0 bladder accidents during this shift .  (An accident is when the episode is not contained in a brief AND/OR the clothing/linen requires changing/cleaning up.) BOWEL:  Pt does take medication. Pt is continent of bowel and uses bedpan. Pt requires staff to empty device    Pt has had 0 bowel accidents during this shift. (An accident is when the episode is not contained in a brief AND/OR the clothing/linen requires changing/cleaning up.) BED/CHAIR TRANSFER Pt requires minimal assistance. Pt uses walker Does the patient require extra time? yes Does the patient require contact guard assistance? no 
Does the patient require more than one staff member for assistance? no 
 
 
 
 
 
 
 
 
EATING Pt requires no assistance. Pt does not wear dentures. TUBE FEEDINGS:  Pt does not  receive nutrition through tube feedings. Patient requires {[facility] TUBE Documentation reviewed and plan of care discussed/reviewed with  
patient during the shift.

## 2018-09-20 NOTE — PROGRESS NOTES
Patient awake and taken off home Trilogy and placed on 3L NC. Patient tolerated home Trilogy well overnight.

## 2018-09-20 NOTE — DISCHARGE SUMMARY
REHABILITATION DISCHARGE SUMMARY Date of admission; 9/11/2018 Discharge Date: 9/21/2018 Primary Care Physician:Dr Brittany Marie Admission Condition: good Discharged Condition: good Hospital Course: The patient was admitted to [facility] at [facility] on 9/11/2018 for physical debility HPI; Ms. Gladys Jones is a delightful, functionally mod I 78 yo female well known to me from her [facility] stay in June 2018 s/p fall with bilateral wrist fxs. She has an extensive PMH including CAD /PCI, CKD, Cardiomyopathy, CHF, COPD with REJI and chronic hypoxic respiratory failure on Trilogy at [facility] , on chronic anticoagulation with Coumadin for hx of mechanical AVR , and ?MDS (Had Bone marrow biopsy that was nondiagnostic however unable to rule out lower grade MDS0 with chronic anemia. She is followed by Dr Mukesh Villavicencio of Heme/onc and receives intermittent blood transfusions and Procrit monthly. She presented to the ED on 9/7  after falling in her BR on 8/28. Fortunately, her aid was there at the house assisting her. She had sustained a left knee injury. She presented due to weakness, dizziness and overall fatigue and family noted worsening confusion. She had received one unit of blood on 8/31 for a Hgb of 6.7. In the ED, her hgb was 6.5. She received a blood transfusion and her hgb improved to 9.3 and is currently 9.6. An Xray of the left knee showed pretibial soft tissue edema and swelling, but no evidence of fracture or other acute bony abnormality. She was seen in consultation by orthopedist, Dr Jennifer Nicole. Her INR was 4.3 on admission, with goal of 2.5-3.0. Coumadin has been on hold. He performed epidermolysis of the blisters earlier today. She is subtherapeutic on coumadin and is being restarted on it today. She is also on renal dosed therapeutic Lovenox. Her renal fxn has bumped back up to a creatinine of 2.05 from 1.55, her baseline is approx 1.8-2 Rehabilitation Course:  
daily physician medical management: Pneumonia prophylaxis- Incentive spirometer every hour while awake 
   
Chronic hypoxic respir failure; cont RT txs. On Trilogy at [facility]. Monitor O2 sats and cont O2 supplementation. Currently on 3L per NC; resume home resp txs per [facility] Pulmonary 
   
Insomnia; add Trazadone, or dtg can bring in her Melatonin 
   
DVT risk / DVT Prophylaxis- Will require daily physician exam to assess for signs and symptoms as patient is at increased risk for of thromboembolism. Mobilization as tolerated. Intermittent pneumatic compression devices when in bed Thigh-high or knee-high thromboembolic deterrent hose when out of bed. No further anticoag is needed; on therapeutic Lovenox while bridging to coumadin 
-9/12 inr down 1.3 ;pharmacy dosing; 9/13 not changed ; now on 5mg coumadin; cont lovenox until therapeutic 
-9/14 INR still subtherap at 1.3 , thus continue sq Lovenox; pharmacy dosing; 9/17 INR 1.6; pharm dosing; still on 5mg, likely need to increase; would like INR 2.5 to 3 
-9/19 INR 1.7 still subtherapeutic; on 5mg; 9/19 INR 1.8, cont coumadin and lovenox; ? ??inc coumadin to 6mg; pharm is dosing 
-9/20 2.0; received 7.5 mg yesterday. Dc Lovenox. Was on alternating days of 5mg and 2.5mg at home; will dc on 6mg and HH can check  Monday 9/24 ( INR 2.1 day of dc) 
   
S/p mech AVR; chronic coumadin therapy but such hi risk of bleeding due to multiple falls.   
   
CHF; cont aldactone, coreg, imdur and lasix; 9/13 pt was on Lasix 80mg bid during acute stay, have dec to 40 mg bid; states she takes 20mg at home; monitor closely for signs of chf exacerb.  
-9/14 compensated; monitor as Lasix has decreased to 40 bid, home dose 20 daily; FELIZ on CKD thus decreasing Lasix; change to 40 daily  
-9/17 no peripheral edema, no s/s of CHF, kidney fxn improving; cont 40mg for now of Lasix; bmp 9/19;improving but mild periph edema, lungs clear. Asking RN to weigh.  214 same as admission wt 
   
 Pain Control: ongoing significant pain in knees which is stable and controlled by PRN meds. Will require regular pain assessment and comprenhensive pain management,  
   
Wound Care: Monitor wound status daily per staff and physician. At risk for failure. Will require 24/7 rehab nursing. Keep wound clean and dry, Reinforce dressing PRN and Ice to area for comfort; LLE wound still with serosanguinous drainage and skin peeling. Cont  RN for wound care; cleanse with NS, cover with Telfa and ABD, tape 
   
Hypertension - BP controlled, fluctuating, managed medically.  
   
Acute on chronic anemia; possible MDS followed by Dr Arias Corral. Monitor closely, sp transfusion. Cont epocrit. Cont ferrous sulfate and vit C; recheck  9/21 8.1, max 10.5; has been off Epogen and I am not authorized to order it; will see if Dr Arias Corral can see her beginning of next week  ; called Ngozi Moreira NP, Dr Bang Cassidy is out of office this week. She has ordered epocrit 60k now and f/u with next available appt 
   
Depression; cont Lexapro. Depression regarding medical cond and fear of losing independence 
   
Diabetes mellitus - controlled. poor glycemic control. Will require daily, close FSG monitoring and medication adjustment to optimize glycemic control in setting of acute illness and hospitalization.  
-diet controlled so far 
   
Urinary retention/ neurogenic bladder - schedule voids q6-8 hrs. Check post-void residual as needed; In and Out catheterize if post-void residual is more than 400 cc. 
-having urinary incont; will try timed voids due to urgency 
   
CKD stg 3; creat 2.05 on admission to rehab. Was 1.55 yesterday. Recheck in a.m and review meds contributing. 
-9/12 creat is increasing; dec lasix; recheck pending 9/13.  2.48 Today 9/14 creat 2.35, BUN 74; slowly improving with drop in Lasix/CHF well compensated; DAILY BMP 
-creatinine continues to improve with decreased Lasix; no s/s of chf, no peripheral edema and lungs clear; creat 1.69 from 1.96 yest. BUN down to 54 from 65 yesterday. Close to baseline? 
-recheck 9/19. Today no edema, no increased SOB to suggest fluid overload 
-9/19 weigh today. Minimal pedal edema; no sob. Creat and bun improving still; 9/20 wt 214, same as admission wt. No peripheral edema, no sob 
   
bowel program - add bowel program prn 
   
GERD - add PPI. At times may need additional antacids, Maalox prn. Previous hx of GIB;HH stable 
   
   
 
Functional Level On Admission:  
minimal assist with STS and ambulated 15ft with a RW with CGA. OT reports max assist with ADLS Functional Level At Discharge:  
 
TRANSFERS Daily Assessment Increased time and effort to complete Transfer Type: SPT without device Transfer Assistance : 6 (Modified independent) Sit to Stand Assistance: Modified independent Car Transfers: Not tested  
  
  
GAIT Daily Assessment  
  Amount of Assistance: 5 (Supervision/setup), one time verbal cue for controlling breathing pattern with gait speed Distance (ft): 200 Feet (ft) (200ft x 2 with 7 min sitting rest break) Assistive Device: Walker, rollator Gait training x 30 ft x 1 with no device and modified independence with patient able to manage 02 line. No loss of balance 
  
STEPS or STAIRS Daily Assessment  
  Steps/Stairs Ambulated (#): 0 Level of Assist : 0 (Not tested)  
  
  
BALANCE Daily Assessment Static standing balance activities without assistive device. Modified independent with no loss of balance Sitting - Static: Good (unsupported) Sitting - Dynamic: Good (unsupported) Standing - Static: Good Standing - Dynamic : Impaired  
  
  
WHEELCHAIR MOBILITY Daily Assessment  
  Curbs/Ramps Assist Required (FIM Score): 0 (Not tested) Wheelchair Setup Assist Required : 0 (Not tested)  
  
  
LOWER EXTREMITY EXERCISES Daily Assessment  
  Extremity: Both Exercise Type #1: Other (comment) (LE motomed x 10 mins at level 3) Level of Assist: Supervision  
  
   
  
Assessment: patient has met bed mobility and transfer goals and progressing Bathing Functional Level 5 Body Parts Patient Bathed Abdomen;Arm, left; Buttocks;Arm, right;Chest;Madi area; Thigh, left; Thigh, right Comments Supervision for standing balance as patient bathes bottom/madi area Upper Body Dressing/Undressing Functional Level 3 Items Applied/Steps Completed Pullover (4 steps) Comments Assist to help over LUE and down back Lower Body Dressing/Undressing Functional Level 4 Items Applied/Steps Completed Elastic waist pants (3 steps); Underpants (3 steps); Shoe, right (1 step); Shoe, left (1 step) Comments Assist to progress pants over feet d/t twisting Toileting Functional Level 5 Comments SBA Bed/Chair/Wheelchair Transfers Sit to Stand Assistance Modified independent Toilet Transfer [place] Toilet Transfer Score 6 Comments SPT wheelchair <> toilet using grab bar  
  
  
S: \"I usually do my right arm first, I should have done that. \" [threading through shirt] Agreeable to therapy. Focus of session was on ADL and functional mobility, followed by UE therapeutic exercise in therapy gym. Patient was able to ambulate ~ 10 feet x 2 without an AD with modified independence. 
  
Patient completed 1 set x 10 reps of RUE AAROM towel slides standing at elevated tabletop with SBA stabilizing through LUE on tabletop for support. Patient completed shoulder flexion/extension, shoulder horizontal adduction/abduction, and clockwise/counter-clockwise circles. 
  
Pain not rated, therapy to tolerance. RN provided medications this session. Collaborated with PT, Alton Voss regarding mobility status and confirmed patient is on track to reach goals as documented in the care plan.   
Patient tolerated session well, but activity tolerance, balance, functional mobility, strength, coordination are still below baseline and require skilled facilitation to successfully and safely complete ADL's and transfers Past Medical History:  
Diagnosis Date  Anticoagulated on Coumadin 2013 S/P AVR  CAD (coronary artery disease) 2013 PCI LAD with stent placed  Cardiomyopathy ([address]  CHF (congestive heart failure) ([address] 2017  CKD (chronic kidney disease) stage 3, GFR 30-59 ml/min 7/10/2013  COPD (chronic obstructive pulmonary disease) ([address] 2014  Essential hypertension, benign 2013  HLD (hyperlipidemia)  ICD (implantable cardioverter-defibrillator) in place 10/2/2014 [employer] single-chamber ICD implantation 10/20/14  Iron deficiency anemia due to chronic blood loss 2009  MDS (myelodysplastic syndrome) ([address] 2011 Procrit started in 11 Procrit weekly and Iron stores. 12 good response to 3 weekly procrit did not need it last time  3- Pt doing well. Just wanted a \"check-up. \" Responding to Procrit every three weeks. 13 patient has missed some injections on recently restarted hemoglobin only issue , takes oral iron iron stores each time  REJI (obstructive sleep apnea)-cpap 2014  Osteoarthritis  Osteopenia  Quadrantanopia  Visual complaint 2015 Past Surgical History:  
Procedure Laterality Date [address]    HX AORTIC VALVE REPLACEMENT  ,   
 mechanical valve   HX  SECTION    
 HX CORONARY STENT PLACEMENT  May, 2014 STEMI with Stent placement.  HX ENDOSCOPY  2009 EGD [address] CATHETERIZATION    HX PACEMAKER  10/2/2014 [employer] ICD  HX TUBAL LIGATION    
 IR BX BONE MARROW DIAGNOSTIC  2011 Family History Problem Relation Age of Onset  Heart Disease Mother CHF  Hypertension Mother  Kidney Disease Mother  Heart Disease Father CHF  Lung Disease Father  Diabetes Father  Cancer Father   
  prostate  Hypertension Father  Heart Attack Father  Heart Disease Maternal Aunt  Diabetes Brother  Coronary Artery Disease Other  Breast Cancer Neg Hx Social History Substance Use Topics  Smoking status: Former Smoker Packs/day: 0.25 Years: 42.00 Types: Cigarettes Start date: 10/1/1956 Quit date: 5/1/2014  Smokeless tobacco: Never Used  Alcohol use No  
 
Prior to Admission medications Medication Sig Start Date End Date Taking? Authorizing Provider  
carvedilol (COREG) 6.25 mg tablet Take 1 Tab by mouth two (2) times daily (with meals). 9/20/18  Yes Felisha Banerjee MD  
furosemide (LASIX) 40 mg tablet Take 1 Tab by mouth daily. 9/20/18  Yes Felisha Banerjee MD  
traZODone (DESYREL) 50 mg tablet Take 0.5-1 Tabs by mouth nightly. 9/20/18  Yes Felisha Banerjee MD  
warfarin (COUMADIN) 6 mg tablet Take 1 Tab by mouth every evening. Indications: THROMBOEMBOLISM DUE TO PROSTHETIC HEART VALVES 9/20/18  Yes Felisha Banerjee MD  
enoxaparin (LOVENOX) 100 mg/mL 100 mg by SubCUTAneous route every twenty-four (24) hours. 9/11/18   Benjamin Arriola MD  
spironolactone (ALDACTONE) 25 mg tablet Take 1 Tab by mouth two (2) times a day. Pill bottles states 3.5 daily but patient is taking one in am and one in pm 8/21/18   Anurag Bacon MD  
carvedilol (COREG) 3.125 mg tablet Take 2 Tabs by mouth two (2) times daily (with meals). 8/21/18   Anurag Bacon MD  
simvastatin (ZOCOR) 20 mg tablet TAKE 1 TABLET BY MOUTH EVERY DAY 8/21/18   Anurag Bacon MD  
traMADol (ULTRAM) 50 mg tablet TAKE 1 TABLET BY MOUTH EVERY 4 HOURS AS NEEDED 8/21/18   Anurag Bacon MD  
isosorbide mononitrate ER (IMDUR) 30 mg tablet TAKE 1 TAB BY MOUTH EVERY MORNING. 8/21/18   Anurag Bacon MD  
escitalopram oxalate (LEXAPRO) 20 mg tablet TAKE 1 TAB BY MOUTH DAILY. INDICATIONS: ANXIETY WITH DEPRESSION 8/21/18   Monique Abdalla MD  
sacubitril-valsartan (ENTRESTO) 24 mg/26 mg tablet Take 1 Tab by mouth two (2) times a day. INDICATIONS: CHRONIC HEART FAILURE 7/23/18   Brody Huang MD  
calcium citrate 200 mg (950 mg) tablet Take 200 mg by mouth two (2) times a day. Historical Provider  
multivit-minerals/folic acid (ADULT ONE DAILY MULTIVITAMIN PO) Take 1 Tab by mouth daily. Historical Provider  
ascorbic acid, vitamin C, (VITAMIN C) 500 mg tablet Take 500 mg by mouth daily. Historical Provider  
aspirin delayed-release 81 mg tablet Take 81 mg by mouth daily. Historical Provider  
warfarin (COUMADIN) 5 mg tablet Take 1 Tab by mouth five (5) days a week. Patient taking differently: Take 1 Tab by mouth five (5) days a week. Sunday, Tuesday, Thursday, Friday. Saturday 5/2/18   Saira Batista DO  
warfarin (COUMADIN) 2.5 mg tablet Take 1 Tab by mouth two (2) days a week. Patient taking differently: Take 1 Tab by mouth two (2) days a week. Monday and Friday 5/5/18   Saira Batista DO  
ferrous gluconate 324 mg (38 mg iron) tablet TAKE 1 TAB BY MOUTH DAILY (BEFORE BREAKFAST). INDICATIONS: IRON DEFICIENCY ANEMIA Patient taking differently: two (2) times a day. TAKE 1 TAB BY MOUTH DAILY (BEFORE BREAKFAST). INDICATIONS: IRON DEFICIENCY ANEMIA Takes twice a day 4/5/18   Monique Abdalla MD  
fluticasone (FLONASE) 50 mcg/actuation nasal spray 2 Sprays by Both Nostrils route daily as needed. 3/7/18   Monique Abdalla MD  
umeclidinium (INCRUSE ELLIPTA) 62.5 mcg/actuation inhaler Take 1 Puff by inhalation daily. Indications: j44.9 2/27/18   Katiuska Belcher NP  
loratadine (CLARITIN) 10 mg tablet TAKE 1 TAB BY MOUTH DAILY. Patient taking differently: TAKE 1 TAB BY MOUTH DAILY PRN 1/2/18   CAROLINA Hook  
mometasone-formoterol (DULERA) 200-5 mcg/actuation HFA inhaler 2 puffs bid, rinse mouth after use.  8/30/17   Katiuska Belcher, NP  
 albuterol (PROVENTIL VENTOLIN) 2.5 mg /3 mL (0.083 %) nebulizer solution 3 mL by Nebulization route every four (4) hours as needed for Wheezing. 8/30/17   Aminta Steven NP  
albuterol (VENTOLIN HFA) 90 mcg/actuation inhaler 2 puffs qid prn 8/30/17   Aminta Steven NP  
cholecalciferol, VITAMIN D3, (VITAMIN D3) 5,000 unit tab tablet Take 1 Tab by mouth daily. 6/15/16   Anurag Bacon MD  
acetaminophen (TYLENOL) 325 mg tablet Take 650 mg by mouth every four (4) hours as needed for Pain. Historical Provider OXYGEN-AIR DELIVERY SYSTEMS 3 L by Nasal route continuous. Historical Provider  
nitroglycerin (NITROSTAT) 0.4 mg SL tablet 0.4 mg by SubLINGual route every five (5) minutes as needed. Historical Provider Allergies Allergen Reactions  Sulfa (Sulfonamide Antibiotics) Hives  Aspirin Nausea Only Cannot take uncoated aspirin Lab Review:  
Recent Results (from the past 72 hour(s)) GLUCOSE, POC Collection Time: 09/17/18  4:23 PM  
Result Value Ref Range Glucose (POC) 129 (H) 65 - 100 mg/dL GLUCOSE, POC Collection Time: 09/17/18  9:01 PM  
Result Value Ref Range Glucose (POC) 149 (H) 65 - 100 mg/dL PROTHROMBIN TIME + INR Collection Time: 09/18/18  6:10 AM  
Result Value Ref Range Prothrombin time 19.4 (H) 11.5 - 14.5 sec INR 1.7 GLUCOSE, POC Collection Time: 09/18/18  6:50 AM  
Result Value Ref Range Glucose (POC) 97 65 - 100 mg/dL GLUCOSE, POC Collection Time: 09/18/18 11:24 AM  
Result Value Ref Range Glucose (POC) 150 (H) 65 - 100 mg/dL GLUCOSE, POC Collection Time: 09/18/18  3:49 PM  
Result Value Ref Range Glucose (POC) 132 (H) 65 - 100 mg/dL GLUCOSE, POC Collection Time: 09/18/18  8:42 PM  
Result Value Ref Range Glucose (POC) 164 (H) 65 - 100 mg/dL PROTHROMBIN TIME + INR Collection Time: 09/19/18  6:38 AM  
Result Value Ref Range Prothrombin time 20.1 (H) 11.5 - 14.5 sec INR 1.8 METABOLIC PANEL, BASIC Collection Time: 09/19/18  6:38 AM  
Result Value Ref Range Sodium 141 136 - 145 mmol/L Potassium 4.5 3.5 - 5.1 mmol/L Chloride 102 98 - 107 mmol/L  
 CO2 34 (H) 21 - 32 mmol/L Anion gap 5 (L) 7 - 16 mmol/L Glucose 85 65 - 100 mg/dL BUN 47 (H) 8 - 23 MG/DL Creatinine 1.65 (H) 0.6 - 1.0 MG/DL  
 GFR est AA 40 (L) >60 ml/min/1.73m2 GFR est non-AA 33 (L) >60 ml/min/1.73m2 Calcium 9.2 8.3 - 10.4 MG/DL  
GLUCOSE, POC Collection Time: 09/19/18  6:58 AM  
Result Value Ref Range Glucose (POC) 90 65 - 100 mg/dL GLUCOSE, POC Collection Time: 09/19/18 11:13 AM  
Result Value Ref Range Glucose (POC) 102 (H) 65 - 100 mg/dL GLUCOSE, POC Collection Time: 09/19/18  3:54 PM  
Result Value Ref Range Glucose (POC) 126 (H) 65 - 100 mg/dL GLUCOSE, POC Collection Time: 09/19/18  8:28 PM  
Result Value Ref Range Glucose (POC) 173 (H) 65 - 100 mg/dL PROTHROMBIN TIME + INR Collection Time: 09/20/18  6:00 AM  
Result Value Ref Range Prothrombin time 21.8 (H) 11.5 - 14.5 sec INR 2.0 Functional Assessment: 
Gross Assessment AROM: Generally decreased, functional (09/18/18 1600) Strength: Generally decreased, functional (09/18/18 1600) Balance Sitting - Static: Good (unsupported) (09/20/18 1200) Sitting - Dynamic: Good (unsupported) (09/20/18 1200) Standing - Static: Good (09/20/18 1200) Standing - Dynamic : Impaired (09/20/18 1200) Debria Check Fall Risk Assessment: 
Ariela Ground Risk Mobility: Ambulates or transfers with assist devices or assistance (09/20/18 8801) Mobility Interventions: Utilize walker, cane, or other assistive device;Utilize gait belt for transfers/ambulation;Patient to call before getting OOB; Communicate number of staff needed for ambulation/transfer;Bed/chair exit alarm (09/20/18 8087) Mentation: Alert, oriented x 3 (09/20/18 4986) Medication: Patient receiving anticonvulsants, sedatives(tranquilizers), psychotropics or hypnotics, hypoglycemics, narcotics, sleep aids, antihypertensives, laxatives, or diuretics (09/20/18 0814) Medication Interventions: Utilize gait belt for transfers/ambulation; Teach patient to arise slowly; Patient to call before getting OOB; Evaluate medications/consider consulting pharmacy; Assess postural VS orthostatic hypotension;Bed/chair exit alarm (09/20/18 1699) Elimination: Needs assistance with toileting (09/20/18 0658) Elimination Interventions: Urinal in reach; Toileting schedule/hourly rounds; Patient to call for help with toileting needs; Elevated toilet seat;Call light in reach (09/20/18 3306) Prior Fall History: Before admission in past 12 months _home or previous inpatient care) (09/20/18 7039) History of Falls Interventions: Room close to nurse's station;Utilize gait belt for transfer/ambulation; Investigate reason for fall;Evaluate medications/consider consulting pharmacy; Door open when patient unattended (09/20/18 0240) Total Score: 4 (09/20/18 7360) Standard Fall Precautions: Yes (09/18/18 2042) High Fall Risk: Yes (09/20/18 0941) Speech Assessment: 
    
 
Ambulation: 
Gait Distance (ft): 200 Feet (ft) (200ft x 2 with 7 min sitting rest break) (09/20/18 1200) Assistive Device: Walker, rollator (09/20/18 1200) Rail Use: Left  (09/18/18 1600) Visit Vitals  /76  Pulse (!) 58  Temp 98.1 °F (36.7 °C)  Resp 16  Wt 214 lb 8 oz (97.3 kg)  SpO2 95%  BMI 39.23 kg/m2 Intake and Output:   
09/18 1901 - 09/20 0700 In: 480 [P.O.:480] Out: - Discharge Exam: 
General: Alert and age appropriately oriented. No acute cardio respiratory distress. HEENT: Normocephalic,no scleral icterus; poor visual acuity Oral mucosa moist without cyanosis Lungs: Clear to auscultation  bilaterally. Respiration even and unlabored Heart: Regular rate and rhythm, S1, S2  
 No  murmurs, clicks, rub or gallops Abdomen: Soft, non-tender, nondistended. Bowel sounds present. No organomegaly. obese Genitourinary: Benign . Neuromuscular:  
  Grossly no focal motor deficits noted. Moves ankles. Skin/extremity: No rashes, no erythema. No calf tenderness BLE Wound LLE still with serosang drainage. No erythema or foul odor No edema Problem List as of 9/20/2018  Date Reviewed: 8/31/2018 Codes Class Noted - Resolved Coagulopathy ([facility] INR >4 on admission 9/7/18 ICD-10-CM: D68.9 ICD-9-CM: 286.9  9/8/2018 - Present Traumatic hematoma of left knee ICD-10-CM: B64.93HL ICD-9-CM: 924.11  9/8/2018 - Present Debility ICD-10-CM: R53.81 ICD-9-CM: 799.3  9/8/2018 - Present Anemia ICD-10-CM: D64.9 ICD-9-CM: 285.9  9/7/2018 - Present Chronic respiratory failure with hypoxia (HCC) (Chronic) ICD-10-CM: J96.11 
ICD-9-CM: 518.83, 799.02  9/7/2018 - Present Acute kidney injury superimposed on chronic kidney disease ([facility] ICD-10-CM: N17.9, N18.9 ICD-9-CM: 866.00, 585.9  9/7/2018 - Present Encounter for immunization ICD-10-CM: S55 ICD-9-CM: V03.89  8/21/2018 - Present Obesity, morbid ([facility] ICD-10-CM: E66.01 
ICD-9-CM: 278.01  6/8/2018 - Present * (Principal)Physical debility ICD-10-CM: R53.81 ICD-9-CM: 799.3  5/2/2018 - Present Closed nondisplaced fracture of shaft of fifth metacarpal bone of left hand ICD-10-CM: O14.576M ICD-9-CM: 815.03  4/28/2018 - Present Subdural hematoma (HCC) ICD-10-CM: I62.00 ICD-9-CM: 432.1  4/27/2018 - Present Long term (current) use of anticoagulants (Chronic) ICD-10-CM: Z79.01 
ICD-9-CM: V58.61  4/19/2018 - Present Dysthymia ICD-10-CM: F34.1 ICD-9-CM: 300.4  4/5/2018 - Present Type 2 diabetes mellitus with nephropathy (HCC) (Chronic) ICD-10-CM: E11.21 
ICD-9-CM: 250.40, 583.81  12/18/2017 - Present Pulmonary hypertension (HCC) (Chronic) ICD-10-CM: I27.20 ICD-9-CM: 416.8  6/15/2016 - Present S/P AVR (aortic valve replacement) (Chronic) ICD-10-CM: Z95.2 ICD-9-CM: V43.3  Unknown - Present Overview Signed 2/23/2016 11:04 AM by Ramsey Friday Mechanical/Artific Cardiomyopathy ([facility] (Chronic) ICD-10-CM: I42.9 ICD-9-CM: 425.4  Unknown - Present Osteopenia ICD-10-CM: M85.80 ICD-9-CM: 733.90  Unknown - Present HLD (hyperlipidemia) (Chronic) ICD-10-CM: J06.4 ICD-9-CM: 272.4  Unknown - Present Osteoarthritis ICD-10-CM: M19.90 ICD-9-CM: 715.90  Unknown - Present  
   
 ICD (implantable cardioverter-defibrillator) in place ICD-10-CM: Z95.810 ICD-9-CM: V45.02  10/2/2014 - Present Overview Signed 10/2/2014  4:58 PM by Art Lopez III [employer] single-chamber ICD implantation 10/20/14 Diabetes mellitus type 2, diet-controlled (HCC) (Chronic) ICD-10-CM: E11.9 ICD-9-CM: 250.00  8/28/2014 - Present COPD (chronic obstructive pulmonary disease) (HCC) (Chronic) ICD-10-CM: J44.9 ICD-9-CM: 028  4/2/2014 - Present REJI (obstructive sleep apnea)-cpap (Chronic) ICD-10-CM: G47.33 
ICD-9-CM: 327.23  4/2/2014 - Present CKD (chronic kidney disease) stage 3, GFR 30-59 ml/min (Chronic) ICD-10-CM: N18.3 ICD-9-CM: 585.3  7/10/2013 - Present Anticoagulated on Coumadin (Chronic) ICD-10-CM: Z51.81, Z79.01 
ICD-9-CM: V58.83, V58.61  7/9/2013 - Present Overview Signed 7/9/2013  4:16 PM by Selena Michael NP  
  S/P AVR 
  
  
   
 CAD (coronary artery disease) (Chronic) ICD-10-CM: I25.10 ICD-9-CM: 414.00  1/20/2013 - Present Overview Signed 5/20/2014 10:05 AM by Wilhemena Collet, NP  
  5/8/14 PCI LAD with stent placed Chronic combined systolic and diastolic heart failure (HCC) (Chronic) ICD-10-CM: I50.42 
ICD-9-CM: 428.42  1/20/2013 - Present  Overview Addendum 4/28/2018  4:53 AM by Tom Fong MD  
 5/8/14 ECHO:  EF 10-15% 12/2017:  EF 25-30% Essential hypertension, benign (Chronic) ICD-10-CM: I10 
ICD-9-CM: 401.1  1/20/2013 - Present MDS (myelodysplastic syndrome) (HCC) (Chronic) ICD-10-CM: D46.9 ICD-9-CM: 238.75  12/17/2011 - Present Overview Addendum 8/29/2013 11:06 AM by Elana George Procrit started in August, 2011 12/18/11 Procrit weekly and Iron stores. 5-12 12-13-12 good response to 3 weekly procrit did not need it last time 3-7-13 Pt doing well. Just wanted a \"check-up. \" Responding to Procrit every three weeks. 8-29-13 patient has missed some injections on recently restarted hemoglobin only issue , takes oral iron iron stores each time Iron deficiency anemia due to chronic blood loss (Chronic) ICD-10-CM: D50.0 ICD-9-CM: 280.0  7/29/2009 - Present RESOLVED: Routine general medical examination at a health care facility ICD-10-CM: Z00.00 ICD-9-CM: V70.0  12/16/2016 - 4/27/2018 Discharge Instructions: 1. Diet: Diabetic Diet 2. Activity: Activity as tolerated 3. Wound Care: Keep wound clean and dry and Reinforce dressing PRN Current Discharge Medication List  
  
START taking these medications Details  
traZODone (DESYREL) 50 mg tablet Take 0.5-1 Tabs by mouth nightly. Qty: 60 Tab, Refills: 2 Associated Diagnoses: REJI (obstructive sleep apnea) CONTINUE these medications which have CHANGED Details  
carvedilol (COREG) 6.25 mg tablet Take 1 Tab by mouth two (2) times daily (with meals). Qty: 60 Tab, Refills: 3 Associated Diagnoses: Anemia, unspecified type; Acute kidney injury superimposed on chronic kidney disease ([address] Coronary artery disease involving native coronary artery of native heart without angina pectoris; Chronic combined systolic and diastolic heart failure ([address] Chronic respiratory failure with hypoxia ([address]  Debility; MDS (myelodysplastic syndrome) ([facility] Type 2 diabetes mellitus with nephropathy ([facility] Essential hypertension, benign; Anticoagulated on Coumadin; CKD (chronic kidney disease) stage 3, GFR 30-59 ml/min; Chronic obstructive pulmonary disease, unspecified COPD type ([facility] REJI (obstructive sleep apnea); Diabetes mellitus type 2, diet-controlled ([facility] ICD (implantable cardioverter-defibrillator) in place; S/P AVR (aortic valve replacement); Obesity, morbid ([facility] Coagulopathy ([facility] Traumatic hematoma of left knee, sequela; Iron deficiency anemia due to chronic blood loss; Osteopenia, unspecified location; Mixed hyperlipidemia; Osteoarthritis of shoulder, unspecified laterality, unspecified osteoarthritis type; Cardiomyopathy, unspecified type ([facility] Pulmonary hypertension ([facility] Dysthymia; Long term (current) use of anticoagulants; Subdural hematoma ([facility] Closed nondisplaced fracture of shaft of fifth metacarpal bone of left hand, sequela; Physical debility; Encounter for immunization  
  
furosemide (LASIX) 40 mg tablet Take 1 Tab by mouth daily. Qty: 30 Tab, Refills: 6 Associated Diagnoses: Essential hypertension, benign  
  
warfarin (COUMADIN) 6 mg tablet Take 1 Tab by mouth every evening. Indications: THROMBOEMBOLISM DUE TO PROSTHETIC HEART VALVES Qty: 30 Tab, Refills: 2 Associated Diagnoses: Anemia, unspecified type; Acute kidney injury superimposed on chronic kidney disease ([facility] Coronary artery disease involving native coronary artery of native heart without angina pectoris; Chronic combined systolic and diastolic heart failure ([facility] Chronic respiratory failure with hypoxia ([facility] Debility; MDS (myelodysplastic syndrome) ([facility] Type 2 diabetes mellitus with nephropathy ([facility] Essential hypertension, benign;  Anticoagulated on Coumadin; CKD (chronic kidney disease) stage 3, GFR 30-59 ml/min; Chronic obstructive pulmonary disease, unspecified COPD type ([facility] REJI (obstructive sleep apnea); Diabetes mellitus type 2, diet-controlled ([facility] ICD (implantable cardioverter-defibrillator) in place; S/P AVR (aortic valve replacement); Obesity, morbid ([facility] Coagulopathy ([facility] Traumatic hematoma of left knee, sequela; Iron deficiency anemia due to chronic blood loss; Osteopenia, unspecified location; Mixed hyperlipidemia; Osteoarthritis of shoulder, unspecified laterality, unspecified osteoarthritis type; Cardiomyopathy, unspecified type ([facility] Pulmonary hypertension ([facility] Dysthymia; Long term (current) use of anticoagulants; Subdural hematoma ([facility] Closed nondisplaced fracture of shaft of fifth metacarpal bone of left hand, sequela; Physical debility; Encounter for immunization CONTINUE these medications which have NOT CHANGED Details  
spironolactone (ALDACTONE) 25 mg tablet Take 1 Tab by mouth two (2) times a day. Pill bottles states 3.5 daily but patient is taking one in am and one in pm 
Qty: 180 Tab, Refills: 3  
  
simvastatin (ZOCOR) 20 mg tablet TAKE 1 TABLET BY MOUTH EVERY DAY Qty: 90 Tab, Refills: 0 Comments: FAXED Associated Diagnoses: Mixed hyperlipidemia  
  
traMADol (ULTRAM) 50 mg tablet TAKE 1 TABLET BY MOUTH EVERY 4 HOURS AS NEEDED Qty: 180 Tab, Refills: 1 Comments: Not to exceed 4 additional fills before 03/07/2018 Associated Diagnoses: Osteoarthritis of shoulder, unspecified laterality, unspecified osteoarthritis type  
  
isosorbide mononitrate ER (IMDUR) 30 mg tablet TAKE 1 TAB BY MOUTH EVERY MORNING. Qty: 90 Tab, Refills: 3 Associated Diagnoses: Chronic combined systolic and diastolic heart failure ([facility] escitalopram oxalate (LEXAPRO) 20 mg tablet TAKE 1 TAB BY MOUTH DAILY. INDICATIONS: ANXIETY WITH DEPRESSION Qty: 90 Tab, Refills: 4 Associated Diagnoses: Dysthymia  
  
sacubitril-valsartan (ENTRESTO) 24 mg/26 mg tablet Take 1 Tab by mouth two (2) times a day. INDICATIONS: CHRONIC HEART FAILURE Qty: 60 Tab, Refills: 6 calcium citrate 200 mg (950 mg) tablet Take 200 mg by mouth two (2) times a day. multivit-minerals/folic acid (ADULT ONE DAILY MULTIVITAMIN PO) Take 1 Tab by mouth daily. ascorbic acid, vitamin C, (VITAMIN C) 500 mg tablet Take 500 mg by mouth daily. aspirin delayed-release 81 mg tablet Take 81 mg by mouth daily. ferrous gluconate 324 mg (38 mg iron) tablet TAKE 1 TAB BY MOUTH DAILY (BEFORE BREAKFAST). INDICATIONS: IRON DEFICIENCY ANEMIA Qty: 90 Tab, Refills: 3 Associated Diagnoses: Iron deficiency anemia due to chronic blood loss  
  
fluticasone (FLONASE) 50 mcg/actuation nasal spray 2 Sprays by Both Nostrils route daily as needed. Qty: 3 Bottle, Refills: 3  
  
umeclidinium (INCRUSE ELLIPTA) 62.5 mcg/actuation inhaler Take 1 Puff by inhalation daily. Indications: j44.9 Qty: 1 Inhaler, Refills: 11  
  
loratadine (CLARITIN) 10 mg tablet TAKE 1 TAB BY MOUTH DAILY. Qty: 30 Tab, Refills: 1 Associated Diagnoses: Upper respiratory tract infection, unspecified type  
  
mometasone-formoterol (DULERA) 200-5 mcg/actuation HFA inhaler 2 puffs bid, rinse mouth after use. Qty: 1 Inhaler, Refills: 11  
 Associated Diagnoses: Pulmonary hypertension ([address] Chronic respiratory failure with hypoxia (HCC)  
  
albuterol (PROVENTIL VENTOLIN) 2.5 mg /3 mL (0.083 %) nebulizer solution 3 mL by Nebulization route every four (4) hours as needed for Wheezing. Qty: 120 Each, Refills: 11  
 Associated Diagnoses: Pulmonary hypertension ([address] Chronic respiratory failure with hypoxia (HCC)  
  
albuterol (VENTOLIN HFA) 90 mcg/actuation inhaler 2 puffs qid prn 
Qty: 1 Inhaler, Refills: 11  
 Associated Diagnoses: Pulmonary hypertension ([address] Chronic respiratory failure with hypoxia (HCC)  
  
cholecalciferol, VITAMIN D3, (VITAMIN D3) 5,000 unit tab tablet Take 1 Tab by mouth daily.  
Qty: 90 Tab, Refills: 4  
  
acetaminophen (TYLENOL) 325 mg tablet Take 650 mg by mouth every four (4) hours as needed for Pain. OXYGEN-AIR DELIVERY SYSTEMS 3 L by Nasal route continuous. nitroglycerin (NITROSTAT) 0.4 mg SL tablet 0.4 mg by SubLINGual route every five (5) minutes as needed. STOP taking these medications  
  
 enoxaparin (LOVENOX) 100 mg/mL Comments:  
Reason for Stopping:   
   
  
 
 
Rehabilitation Management: 1. Devices: None; patient has all needed eqpt from prior hospitalizations 2. Consult: Rehab team including PT, OT, recreational therapy, and  Disposition: home with [facility] PT, RN for wound care Follow-up with Pulmonary, [facility], Dr Radha Corrales and oncology ( Dr Sevilla Ran next available appt) >35 min Signed By: Paulina Maya MD   
 September 21, 2018

## 2018-09-20 NOTE — PROGRESS NOTES
[facility] - INPATIENT Face to Face Encounter Patients Name: Myke Lopez    YOB: 1950 Ordering Physician: Jose Johnson Primary Diagnosis: debility Physical debility Date of Face to Face:   9/20/2018 Face to Face Encounter findings are related to primary reason for home care:   yes. 1. I certify that the patient needs intermittent care as follows: skilled nursing care:  skilled observation/assessment, patient education and wound care 
physical therapy: strengthening, stretching/ROM and transfer training 2. I certify that this patient is homebound, that is: 1) patient requires the use of a walker device, special transportation, or assistance of another to leave the home; or 2) patient's condition makes leaving the home medically contraindicated; and 3) patient has a normal inability to leave the home and leaving the home requires considerable and taxing effort. Patient may leave the home for infrequent and short duration for medical reasons, and occasional absences for non-medical reasons. Homebound status is due to the following functional limitations: Patient with strength deficits limiting the performance of all ADL's without caregiver assistance or the use of an assistive device. Patient with poor safety awareness and is at risk for falls without assistance of another person and the use of an assistive device. Patient with poor ambulation endurance limiting their safe ability to ascend/descend the required number of steps to leave the home. 3. I certify that this patient is under my care and that I, or a nurse practitioner or  [id number], or clinical nurse specialist, or certified nurse midwife, working with me, had a Face-to-Face Encounter that meets the physician Face-to-Face Encounter requirements.   The following are the clinical findings from the [address] encounter that support the need for skilled services and is a summary of the encounter:  
 
See hospital cart. Agatha Mcfadden, LMSW 
9/20/2018 THE FOLLOWING TO BE COMPLETED BY THE COMMUNITY PHYSICIAN: 
 
I concur with the findings described above from the F2F encounter that this patient is homebound and in need of a skilled service. Certifying Physician: _____________________________________ Printed Certifying Physician Name: _____________________________________ Date: _________________

## 2018-09-20 NOTE — PROGRESS NOTES
PHYSICAL THERAPY DAILY NOTE Time In: 0639 Time Out: 1047 Patient Seen For: AM;Balance activities;Gait training;Patient education; Therapeutic exercise;Transfer training; Other (see progress notes) Subjective: Patient reporting she feels OK. Reports her right shoulder is hurting from exercises. Reports she wants to try walking a long distance Objective:Vital Signs:Patient on 02 at 3 lpm. Resting 02 sat 92 to 98%, HR 64, 02 sat 92% and HR 70 after ambulating 200ft Patient Vitals for the past 12 hrs: 
 Temp Pulse Resp BP SpO2  
09/20/18 0710 98.1 °F (36.7 °C) (!) 58 16 135/76 96 %  
09/20/18 0536 - - - - 93 % Pain level:6 to 7 out of 10 Pain location:right shoulder Pain interventions:Pain medication per RN, rest, positioning,massage to right shoulder with biofreeze, vibrator to right upper trap Patient education:Balance training,transfer training, gait training, fall precautions, activity pacing,body mechanics,energy conservation, breathing techniques during functional mobility. Patient verbalizing understanding and demonstrating  understanding of patient education. Recommend follow up education. Interdisciplinary Communication:spoke with MD regarding order for patient to be modified independent in room Other (comment) (Low Vision, Fall Risk) GROSS ASSESSMENT Daily Assessment NA  
 
 
BED/MAT MOBILITY Daily Assessment Rolling Right : 0 (Not tested) Rolling Left : 0 (Not tested) Supine to Sit : 0 (Not tested) Sit to Supine : 0 (Not tested) TRANSFERS Daily Assessment Increased time and effort to complete Transfer Type: SPT without device Transfer Assistance : 6 (Modified independent) Sit to Stand Assistance: Modified independent Car Transfers: Not tested GAIT Daily Assessment Amount of Assistance: 5 (Supervision/setup), one time verbal cue for controlling breathing pattern with gait speed Distance (ft): 200 Feet (ft) (200ft x 2 with 7 min sitting rest break) Assistive Device: Walker, rollator Gait training x 30 ft x 1 with no device and modified independence with patient able to manage 02 line. No loss of balance STEPS or STAIRS Daily Assessment Steps/Stairs Ambulated (#): 0 Level of Assist : 0 (Not tested) BALANCE Daily Assessment Static standing balance activities without assistive device. Modified independent with no loss of balance Sitting - Static: Good (unsupported) Sitting - Dynamic: Good (unsupported) Standing - Static: Good Standing - Dynamic : Impaired WHEELCHAIR MOBILITY Daily Assessment Curbs/Ramps Assist Required (FIM Score): 0 (Not tested) Wheelchair Setup Assist Required : 0 (Not tested) LOWER EXTREMITY EXERCISES Daily Assessment Extremity: Both Exercise Type #1: Other (comment) (LE motomed x 10 mins at level 3) Level of Assist: Supervision Assessment: patient has met bed mobility and transfer goals and progressing towards gait goals. Patient to recreational therapy at end of treatment Plan of Care: Continue with POC and progress as tolerated. Lexi Williamson, PT 
9/20/2018

## 2018-09-20 NOTE — PROGRESS NOTES
During IDR, it was noted that pt will discharge tomorrow with RN/PT. SW spoke with Edmundon daughter to inform and she said they'll pick her up afternoon. HEIDI entered order and referral and notified [facility] and [address] [place] [address] with [facility], [place]. CM following.

## 2018-09-20 NOTE — PROGRESS NOTES
Subjective \"I am doing much better. Thank you for everything you did. \" Activity Field dart toss Strength/Endurance Patient looked and appeared to feel much better. She was more motivated and eager to participate. Balance  Mod (I) with SPT from w/c to recliner. Social Interaction Patient was in good spirits and friendly during the session. Cognitive A&O X4 Comments Patient on 3 liter O2 via nasal canula without c/o or signs of shortness of breath. Patient was assisted to her room and into her recliner chair. She was left with all needs within reach. Pt D/C summary completed on 9/20/18. Please see for specifics in [address] Patient expected to be d/c'd to home on 9/21/18.   Consuelo Champion, JACINTAS

## 2018-09-20 NOTE — PROGRESS NOTES
09/20/18 9990 Bathing Functional Level 5 Body Parts Patient Bathed Abdomen;Arm, left; Buttocks;Arm, right;Chest;Madi area; Thigh, left; Thigh, right Comments Supervision for standing balance as patient bathes bottom/madi area Upper Body Dressing/Undressing Functional Level 3 Items Applied/Steps Completed Pullover (4 steps) Comments Assist to help over LUE and down back Lower Body Dressing/Undressing Functional Level 4 Items Applied/Steps Completed Elastic waist pants (3 steps); Underpants (3 steps); Shoe, right (1 step); Shoe, left (1 step) Comments Assist to progress pants over feet d/t twisting Toileting Functional Level 5 Comments SBA Bed/Chair/Wheelchair Transfers Sit to Stand Assistance Modified independent Toilet Transfer [place] Toilet Transfer Score 6 Comments SPT wheelchair <> toilet using grab bar S: \"I usually do my right arm first, I should have done that. \" [threading through shirt] Agreeable to therapy. Focus of session was on ADL and functional mobility, followed by UE therapeutic exercise in therapy gym. Patient was able to ambulate ~ 10 feet x 2 without an AD with modified independence. Patient completed 1 set x 10 reps of RUE AAROM towel slides standing at elevated tabletop with SBA stabilizing through LUE on tabletop for support. Patient completed shoulder flexion/extension, shoulder horizontal adduction/abduction, and clockwise/counter-clockwise circles. Pain not rated, therapy to tolerance. RN provided medications this session. Collaborated with Doreen LEWIS regarding mobility status and confirmed patient is on track to reach goals as documented in the care plan. Patient tolerated session well, but activity tolerance, balance, functional mobility, strength, coordination are still below baseline and require skilled facilitation to successfully and safely complete ADL's and transfers.   
Patient ended session in wheelchair with Doreen LEWIS.  
 
 Mina Ore, OTR/L

## 2018-09-20 NOTE — PROGRESS NOTES
Warfarin dosing per pharmacist 
 
Gail Gilford [facility] is a 77 y.o. female. Indication:  S/p AVR Goal INR:  2-3 Home dose:  5 mg Sun and 2.5 mg all other days  (Weekly dose of 20 mg) Risk factors or significant drug interactions:  none Other anticoagulants:  Lovenox bridge Daily Monitoring Date  INR     Warfarin dose  HGB              Notes 9/8  3.4  Hold   9.3 
9/9  2.8  Hold   9.0  
9/10  2.0  Hold   9.6 9/11  1.5  5 mg    9.5 9/12  1.3  5 mg   10.5 9/13  1.3  5 mg   --- 
9/14  1.3  (15 mg)*  ---  *patient mistakenly given 7.5 mg dose X 2, Alyse Taranucci entered 9/15  1.4  Hold   --- 
9/16  1.6  5 mg   --- 
9/17  1.6  5 mg   --- 
9/17  1.7  5 mg   --- 
9/18  1.8  7.5 mg   --- 
9/19  2.0  6 mg   --- Pharmacy has been consulted to dose warfarin for this patient due to a history of AVR (mechanical). The INR goal per the patient's home warfarin clinic is 2-3. Patient initially presented with a supratherapeutic INR that has trended down. INR to 2.0. Patient has had higher warfarin requirements compared to above home dosing. Change to 6 mg daily currently,. Following daily INR. Thank you, Homero Bermudez, Pharm. D. Clinical Pharmacist 
[phone]

## 2018-09-20 NOTE — PROGRESS NOTES
Drake Oconnor MD, Medical Director Marcel Cordonarez [address], [address] [facility] Tel: [phone] SFD PROGRESS NOTE [address] [place] [address] Admit Date: 9/11/2018 Admit Diagnosis: debility; Physical debility Subjective Doing well. PT says she can have \"green socks\" . Walked 400ft with one rest break only. No cp, less PRUETT , no sob at rest 
 
Objective:  
 
Current Facility-Administered Medications Medication Dose Route Frequency  warfarin (COUMADIN) tablet 5 mg  5 mg Oral QPM  
 furosemide (LASIX) tablet 40 mg  40 mg Oral DAILY  traZODone (DESYREL) tablet 25-50 mg  25-50 mg Oral QHS  
 0.9% sodium chloride infusion 250 mL  250 mL IntraVENous PRN  
 acetaminophen (TYLENOL) tablet 650 mg  650 mg Oral Q6H PRN  
 sodium chloride (NS) flush 5-10 mL  5-10 mL IntraVENous PRN  
 albuterol (PROVENTIL VENTOLIN) nebulizer solution 2.5 mg  2.5 mg Nebulization Q4H PRN  
 alum-mag hydroxide-simeth (MYLANTA) oral suspension 30 mL  30 mL Oral Q4H PRN  
 ascorbic acid (vitamin C) (VITAMIN C) tablet 500 mg  500 mg Oral DAILY  aspirin delayed-release tablet 81 mg  81 mg Oral DAILY  bisacodyl (DULCOLAX) suppository 10 mg  10 mg Rectal DAILY PRN  
 budesonide (PULMICORT) 500 mcg/2 ml nebulizer suspension  500 mcg Nebulization BID RT And  
 albuterol (PROVENTIL VENTOLIN) nebulizer solution 2.5 mg  2.5 mg Nebulization Q6HWA RT  
 carvedilol (COREG) tablet 6.25 mg  6.25 mg Oral BID WITH MEALS  enoxaparin (LOVENOX) injection 100 mg  100 mg SubCUTAneous Q24H  
 escitalopram oxalate (LEXAPRO) tablet 20 mg  20 mg Oral DAILY  influenza vaccine 2018-19 (6 mos+)(PF) (FLUARIX QUAD/FLULAVAL QUAD) injection 0.5 mL  0.5 mL IntraMUSCular PRIOR TO DISCHARGE  isosorbide mononitrate ER (IMDUR) tablet 30 mg  30 mg Oral DAILY  ondansetron (ZOFRAN ODT) tablet 4 mg  4 mg Oral Q6H PRN  
 oxyCODONE IR (ROXICODONE) tablet 5-10 mg  5-10 mg Oral Q4H PRN  
  pantoprazole (PROTONIX) tablet 40 mg  40 mg Oral ACB  polyethylene glycol (MIRALAX) packet 17 g  17 g Oral DAILY PRN  
 simvastatin (ZOCOR) tablet 20 mg  20 mg Oral QHS  tiotropium (SPIRIVA) inhalation capsule 18 mcg  18 mcg Inhalation DAILY  traMADol (ULTRAM) tablet 50 mg  50 mg Oral Q4H PRN Review of Systems:Denies chest pain, shortness of breath, cough, headache, visual problems, abdominal pain, dysurea, calf pain. Pertinent positives are as noted in the medical records and unremarkable otherwise. Visit Vitals  /76  Pulse (!) 58  Temp 98.1 °F (36.7 °C)  Resp 16  Wt 214 lb 8 oz (97.3 kg)  SpO2 96%  BMI 39.23 kg/m2 Physical Exam:  
General: Alert and age appropriately oriented. No acute cardio respiratory distress. HEENT: Normocephalic,no scleral icterus; poor visual acuity; ( I had to set up breakfast tray bc she could not even see what was on the plate without having her nose down in it.) Oral mucosa moist without cyanosis Lungs: Clear to auscultation  bilaterally. Respiration even and unlabored Heart: Regular rate and rhythm, S1, S2 No  murmurs, clicks, rub or gallops Abdomen: Soft, non-tender, nondistended. Bowel sounds present. No organomegaly. obese Genitourinary: Benign . Neuromuscular:  
 
 Grossly no focal motor deficits noted. Moves ankles. Skin/extremity: No rashes, no erythema. No calf tenderness BLE Wound LLE still with serosang drainage. No erythema or foul odor No edema Functional Assessment: 
Gross Assessment AROM: Generally decreased, functional (09/18/18 1600) Strength: Generally decreased, functional (09/18/18 1600) Balance Sitting - Static: Good (unsupported) (09/19/18 1500) Sitting - Dynamic: Good (unsupported) (09/19/18 1500) Standing - Static: Good (09/19/18 1500) Standing - Dynamic : Impaired (09/19/18 1500) Padma Chicas Fall Risk Assessment: 
[place] Risk Mobility: Ambulates or transfers with assist devices or assistance (09/19/18 2258) Mobility Interventions: Patient to call before getting OOB; Strengthening exercises (ROM-active/passive); Utilize walker, cane, or other assistive device (09/19/18 2258) Mentation: Alert, oriented x 3 (09/19/18 2258) Medication: Patient receiving anticonvulsants, sedatives(tranquilizers), psychotropics or hypnotics, hypoglycemics, narcotics, sleep aids, antihypertensives, laxatives, or diuretics (09/19/18 2258) Medication Interventions: Patient to call before getting OOB (09/19/18 2258) Elimination: Needs assistance with toileting (09/19/18 2258) Elimination Interventions: Call light in reach (09/19/18 2258) Prior Fall History: Before admission in past 12 months _home or previous inpatient care) (09/19/18 2258) History of Falls Interventions: Door open when patient unattended (09/19/18 2258) Total Score: 4 (09/19/18 2258) Standard Fall Precautions: Yes (09/18/18 2042) High Fall Risk: Yes (09/19/18 2258) Speech Assessment: 
    
 
Ambulation: 
Gait Distance (ft): 150 Feet (ft) (09/19/18 1500) Assistive Device: Walker, rollator (09/19/18 1500) Rail Use: Left  (09/18/18 1600) Labs/Studies: 
Recent Results (from the past 72 hour(s)) GLUCOSE, POC Collection Time: 09/17/18 11:11 AM  
Result Value Ref Range Glucose (POC) 118 (H) 65 - 100 mg/dL GLUCOSE, POC Collection Time: 09/17/18  4:23 PM  
Result Value Ref Range Glucose (POC) 129 (H) 65 - 100 mg/dL GLUCOSE, POC Collection Time: 09/17/18  9:01 PM  
Result Value Ref Range Glucose (POC) 149 (H) 65 - 100 mg/dL PROTHROMBIN TIME + INR Collection Time: 09/18/18  6:10 AM  
Result Value Ref Range Prothrombin time 19.4 (H) 11.5 - 14.5 sec INR 1.7 GLUCOSE, POC Collection Time: 09/18/18  6:50 AM  
Result Value Ref Range Glucose (POC) 97 65 - 100 mg/dL GLUCOSE, POC  
 Collection Time: 09/18/18 11:24 AM  
Result Value Ref Range Glucose (POC) 150 (H) 65 - 100 mg/dL GLUCOSE, POC Collection Time: 09/18/18  3:49 PM  
Result Value Ref Range Glucose (POC) 132 (H) 65 - 100 mg/dL GLUCOSE, POC Collection Time: 09/18/18  8:42 PM  
Result Value Ref Range Glucose (POC) 164 (H) 65 - 100 mg/dL PROTHROMBIN TIME + INR Collection Time: 09/19/18  6:38 AM  
Result Value Ref Range Prothrombin time 20.1 (H) 11.5 - 14.5 sec INR 1.8 METABOLIC PANEL, BASIC Collection Time: 09/19/18  6:38 AM  
Result Value Ref Range Sodium 141 136 - 145 mmol/L Potassium 4.5 3.5 - 5.1 mmol/L Chloride 102 98 - 107 mmol/L  
 CO2 34 (H) 21 - 32 mmol/L Anion gap 5 (L) 7 - 16 mmol/L Glucose 85 65 - 100 mg/dL BUN 47 (H) 8 - 23 MG/DL Creatinine 1.65 (H) 0.6 - 1.0 MG/DL  
 GFR est AA 40 (L) >60 ml/min/1.73m2 GFR est non-AA 33 (L) >60 ml/min/1.73m2 Calcium 9.2 8.3 - 10.4 MG/DL  
GLUCOSE, POC Collection Time: 09/19/18  6:58 AM  
Result Value Ref Range Glucose (POC) 90 65 - 100 mg/dL GLUCOSE, POC Collection Time: 09/19/18 11:13 AM  
Result Value Ref Range Glucose (POC) 102 (H) 65 - 100 mg/dL GLUCOSE, POC Collection Time: 09/19/18  3:54 PM  
Result Value Ref Range Glucose (POC) 126 (H) 65 - 100 mg/dL GLUCOSE, POC Collection Time: 09/19/18  8:28 PM  
Result Value Ref Range Glucose (POC) 173 (H) 65 - 100 mg/dL PROTHROMBIN TIME + INR Collection Time: 09/20/18  6:00 AM  
Result Value Ref Range Prothrombin time 21.8 (H) 11.5 - 14.5 sec INR 2.0 Assessment:  
 
Problem List as of 9/20/2018  Date Reviewed: 8/31/2018 Codes Class Noted - Resolved Coagulopathy ([facility] INR >4 on admission 9/7/18 ICD-10-CM: D68.9 ICD-9-CM: 286.9  9/8/2018 - Present Traumatic hematoma of left knee ICD-10-CM: J93.29EM ICD-9-CM: 924.11  9/8/2018 - Present Debility ICD-10-CM: R53.81 ICD-9-CM: 799.3  9/8/2018 - Present Anemia ICD-10-CM: D64.9 ICD-9-CM: 285.9  9/7/2018 - Present Chronic respiratory failure with hypoxia (HCC) (Chronic) ICD-10-CM: J96.11 
ICD-9-CM: 518.83, 799.02  9/7/2018 - Present Acute kidney injury superimposed on chronic kidney disease ([facility] ICD-10-CM: N17.9, N18.9 ICD-9-CM: 866.00, 585.9  9/7/2018 - Present Encounter for immunization ICD-10-CM: P31 ICD-9-CM: V03.89  8/21/2018 - Present Obesity, morbid ([facility] ICD-10-CM: E66.01 
ICD-9-CM: 278.01  6/8/2018 - Present * (Principal)Physical debility ICD-10-CM: R53.81 ICD-9-CM: 799.3  5/2/2018 - Present Closed nondisplaced fracture of shaft of fifth metacarpal bone of left hand ICD-10-CM: H64.030Z ICD-9-CM: 815.03  4/28/2018 - Present Subdural hematoma (HCC) ICD-10-CM: I62.00 ICD-9-CM: 432.1  4/27/2018 - Present Long term (current) use of anticoagulants (Chronic) ICD-10-CM: Z79.01 
ICD-9-CM: V58.61  4/19/2018 - Present Dysthymia ICD-10-CM: F34.1 ICD-9-CM: 300.4  4/5/2018 - Present Type 2 diabetes mellitus with nephropathy (HCC) (Chronic) ICD-10-CM: E11.21 
ICD-9-CM: 250.40, 583.81  12/18/2017 - Present Pulmonary hypertension (HCC) (Chronic) ICD-10-CM: I27.20 ICD-9-CM: 416.8  6/15/2016 - Present S/P AVR (aortic valve replacement) (Chronic) ICD-10-CM: Z95.2 ICD-9-CM: V43.3  Unknown - Present Overview Signed 2/23/2016 11:04 AM by Luciana Deleon Mechanical/Artific Cardiomyopathy ([address] (Chronic) ICD-10-CM: I42.9 ICD-9-CM: 425.4  Unknown - Present Osteopenia ICD-10-CM: M85.80 ICD-9-CM: 733.90  Unknown - Present HLD (hyperlipidemia) (Chronic) ICD-10-CM: G16.4 ICD-9-CM: 272.4  Unknown - Present Osteoarthritis ICD-10-CM: M19.90 ICD-9-CM: 715.90  Unknown - Present  
   
 ICD (implantable cardioverter-defibrillator) in place ICD-10-CM: Z95.810 ICD-9-CM: V45.02  10/2/2014 - Present  Overview Signed 10/2/2014  4:58 PM by Aide Ceja III  
 Biotronik single-chamber ICD implantation 10/20/14 Diabetes mellitus type 2, diet-controlled (HCC) (Chronic) ICD-10-CM: E11.9 ICD-9-CM: 250.00  8/28/2014 - Present COPD (chronic obstructive pulmonary disease) (HCC) (Chronic) ICD-10-CM: J44.9 ICD-9-CM: 332  4/2/2014 - Present REJI (obstructive sleep apnea)-cpap (Chronic) ICD-10-CM: G47.33 
ICD-9-CM: 327.23  4/2/2014 - Present CKD (chronic kidney disease) stage 3, GFR 30-59 ml/min (Chronic) ICD-10-CM: N18.3 ICD-9-CM: 585.3  7/10/2013 - Present Anticoagulated on Coumadin (Chronic) ICD-10-CM: Z51.81, Z79.01 
ICD-9-CM: V58.83, V58.61  7/9/2013 - Present Overview Signed 7/9/2013  4:16 PM by Shannan Tamez NP  
  S/P AVR 
  
  
   
 CAD (coronary artery disease) (Chronic) ICD-10-CM: I25.10 ICD-9-CM: 414.00  1/20/2013 - Present Overview Signed 5/20/2014 10:05 AM by Albin Gatica NP  
  5/8/14 PCI LAD with stent placed Chronic combined systolic and diastolic heart failure (HCC) (Chronic) ICD-10-CM: I50.42 
ICD-9-CM: 428.42  1/20/2013 - Present Overview Addendum 4/28/2018  4:53 AM by Jeramie Cooper MD  
  5/8/14 ECHO:  EF 10-15% 12/2017:  EF 25-30% Essential hypertension, benign (Chronic) ICD-10-CM: I10 
ICD-9-CM: 401.1  1/20/2013 - Present MDS (myelodysplastic syndrome) (HCC) (Chronic) ICD-10-CM: D46.9 ICD-9-CM: 238.75  12/17/2011 - Present Overview Addendum 8/29/2013 11:06 AM by Phillip Crawley Procrit started in August, 2011 12/18/11 Procrit weekly and Iron stores. 5-12 12-13-12 good response to 3 weekly procrit did not need it last time 3-7-13 Pt doing well. Just wanted a \"check-up. \" Responding to Procrit every three weeks. 8-29-13 patient has missed some injections on recently restarted hemoglobin only issue , takes oral iron iron stores each time Iron deficiency anemia due to chronic blood loss (Chronic) ICD-10-CM: D50.0 ICD-9-CM: 280.0  7/29/2009 - Present RESOLVED: Routine general medical examination at a health care facility ICD-10-CM: Z00.00 ICD-9-CM: V70.0  12/16/2016 - 4/27/2018 Assessment: Profound Physical Debility s/p admitted with moderately severe anemia 
   
Continue daily physician medical management: 
Pneumonia prophylaxis- Incentive spirometer every hour while awake 
   
Chronic hypoxic respir failure; cont RT txs. On Trilogy at [facility]. Monitor O2 sats and cont O2 supplementation. Currently on 3L per NC 
   
Insomnia; add Trazadone, or dtg can bring in her Melatonin 
   
DVT risk / DVT Prophylaxis- Will require daily physician exam to assess for signs and symptoms as patient is at increased risk for of thromboembolism. Mobilization as tolerated. Intermittent pneumatic compression devices when in bed Thigh-high or knee-high thromboembolic deterrent hose when out of bed. No further anticoag is needed; on therapeutic Lovenox while bridging to coumadin 
-9/12 inr down 1.3 ;pharmacy dosing; 9/13 not changed ; now on 5mg coumadin; cont lovenox until therapeutic 
-9/14 INR still subtherap at 1.3 , thus continue sq Lovenox; pharmacy dosing; 9/17 INR 1.6; pharm dosing; still on 5mg, likely need to increase; would like INR 2.5 to 3 
-9/19 INR 1.7 still subtherapeutic; on 5mg; 9/19 INR 1.8, cont coumadin and lovenox; ? ??inc coumadin to 6mg; pharm is dosing 
-9/20 2.0; received 7.5 mg yesterday. 
   
S/p mech AVR; chronic coumadin therapy but such hi risk of bleeding due to multiple falls.   
   
CHF; cont aldactone, coreg, imdur and lasix; 9/13 pt was on Lasix 80mg bid during acute stay, have dec to 40 mg bid; states she takes 20mg at home; monitor closely for signs of chf exacerb.  
-9/14 compensated; monitor as Lasix has decreased to 40 bid, home dose 20 daily;  FELIZ on CKD thus decreasing Lasix; change to 40 daily  
-9/17 no peripheral edema, no s/s of CHF, kidney fxn improving; cont 40mg for now of Lasix; bmp 9/19;improving but mild periph edema, lungs clear. Asking RN to weigh. 214 same as admission wt 
   
Pain Control: ongoing significant pain in knees which is stable and controlled by PRN meds. Will require regular pain assessment and comprenhensive pain management,  
   
Wound Care: Monitor wound status daily per staff and physician. At risk for failure. Will require 24/7 rehab nursing. Keep wound clean and dry, Reinforce dressing PRN and Ice to area for comfort 
   
Hypertension - BP controlled, fluctuating, managed medically.  
   
Acute on chronic anemia; possible MDS followed by Dr Cristobal Alfonso. Monitor closely, sp transfusion. Cont epocrit. Cont ferrous sulfate and vit C; recheck  9/20  
   
Depression; cont Lexapro. Depression regarding medical cond and fear of losing independence 
   
Diabetes mellitus - controlled. poor glycemic control. Will require daily, close FSG monitoring and medication adjustment to optimize glycemic control in setting of acute illness and hospitalization.  
-diet controlled so far 
   
Urinary retention/ neurogenic bladder - schedule voids q6-8 hrs. Check post-void residual as needed; In and Out catheterize if post-void residual is more than 400 cc. 
-having urinary incont; will try timed voids due to urgency 
   
CKD stg 3; creat 2.05 on admission to rehab. Was 1.55 yesterday. Recheck in a.m and review meds contributing. 
-9/12 creat is increasing; dec lasix; recheck pending 9/13. 2.48 Today 9/14 creat 2.35, BUN 74; slowly improving with drop in Lasix/CHF well compensated; DAILY BMP 
-creatinine continues to improve with decreased Lasix; no s/s of chf, no peripheral edema and lungs clear; creat 1.69 from 1.96 yest. BUN down to 54 from 65 yesterday. Close to baseline? 
-recheck 9/19. Today no edema, no increased SOB to suggest fluid overload 
-9/19 weigh today. Minimal pedal edema; no sob.  Creat and bun improving still 
   
bowel program - add bowel program prn 
   
 GERD - add PPI. At times may need additional antacids, Maalox prn. Previous hx of GIB;HH stable 
   
   
 
 
Time spent was 25 minutes with over 1/2 in direct patient care/examination, consultation and coordination of care. Signed By: Amarilis Hamilton MD   
 September 20, 2018

## 2018-09-20 NOTE — PROGRESS NOTES
PHYSICAL THERAPY DISCHARGE SUMMARY 
 TIME IN 1302 TIME OUT 1343 Precautions at discharge: Other (comment) (FALL PRECAUTIONS) Problem List:   
Decreased strength B LE  [x] Decreased strength trunk/core  [x] Decreased AROM   [x] Decreased PROM  [] Decreased balance sitting  [] Decreased balance standing  [x] Decreased endurance  [x] Pain  [x] Functional Limitations:  
Decreased independence with bed mobility  [x] Decreased independence with functional transfers  [x] Decreased independence with ambulation  [x] Decreased independence with stair negotiation  [x] Outcome Measures: Vital Signs:patient on 02 at 3 lpm. 02 sat 87% and HR 70 after ambulating 200ft. 02 sat 90% after 2 minutes of rest 
Patient Vitals for the past 12 hrs: 
 Temp Pulse Resp BP SpO2  
09/20/18 1236 - - - - 95 % 09/20/18 0710 98.1 °F (36.7 °C) (!) 58 16 135/76 96 %  
09/20/18 0536 - - - - 93 % Pain level:4 out of 10 Pain location:right shoulder Pain interventions:Pain medication per RN, rest, positioning,biofreeze, massage Patient education:Bed mobility training,transfer training, gait training, fall precautions, activity pacing, body mechanics,rollator parts management, breathing techniques during functional mobility, energy conservation, Patient verbalizing understanding and demonstrating  understanding of patient education. Recommend follow up education with [facility] Interdisciplinary Communication:spoke with MD and OT regarding D/C plans MMT Initial Asssessment Right Lower Extremity Left Lower Extremity Hip Flexion 3+ 3-  
Knee Extension 5 4+ Knee Flexion 4+ 4 Ankle Dorsiflexion   4  
 
 MMT Discharge Assessment Right Lower Extremity Left Lower Extremity Hip Flexion 3+ 3-  
Knee Extension 5 4+ Knee Flexion 4+ 4 Ankle Dorsiflexion  4 4  
0/5 No palpable muscle contraction 1/5 Palpable muscle contraction, no joint movement 2-/5 Less than full range of motion in gravity eliminated position 2/5 Able to complete full range of motion in gravity eliminated position 2+/5 Able to initiate movement against gravity 3-/5 More than half but not full range of motion against gravity 3/5 Able to complete full range of motion against gravity 3+/5 Completes full range of motion against gravity with minimal resistance 4-/5 Completes full range of motion against gravity with minimal-moderate resistance 4/5 Completes full range of motion against gravity with moderate resistance 4+/5 Completes full range of motion against gravity with moderate-maximum resistance 5/5 Completes full range of motion against gravity with maximum resistance AROM:Bilateral LE hip decreased 25 to 50 % FIM SCORES Initial Assessment Discharge Assessment Bed/Chair/Wheelchair Transfers 4 6 Wheelchair Mobility 0 0 (NT secondary to primary mode of locomotion is ambulation) Walking [place] 1 5 Steps/Stairs 2 2 (supervision) PRIMARY MODE OF LOCOMOTION: ambulation Please see IRC Interdisciplinary Eval: Coordination/Balance Section for details regarding FIM score description. BED/CHAIR/WHEELCHAIR TRANSFERS Initial Assessment Discharge Assessment Rolling Right 6 (Modified independent) 0 (Not tested) Rolling Left 6 (Modified independent) 0 (Not tested) Supine to Sit 5 (Supervision) 6 (Modified independent) Sit to Stand Contact guard assistance Modified independent Sit to Supine 4 (Minimal assistance) 6 (Modified independent) Transfer Assist Score 4 6 Transfer Type SPT without device SPT without device Comments Increased time and effort to complete bed mobility and transfers. Increased time and effort to complete bed mobility and transfers due to UE weakness and limited ROM, abdominal weakness and LE weakness with limited ROM Car Transfer Not tested Minimum assistance Car Type rehab car rehab car [facility] MOBILITY/MANAGEMENT Initial Assessment Discharge Assessment Able to Propel 0 feet  NA Functional Level 0 0 (NT secondary to primary mode of locomotion is ambulation) Curbs/ramps assistance required 0 (Not tested) 0 (Not tested) Wheelchair set up assistance required 0 (Not tested) 0 (Not tested) Wheelchair management    NA  
 
 
WALKING INDEPENDENCE Initial Assessment Discharge Assessment Assistive device Gait belt Walker, rollator Ambulation assistance - level surface 4 (Contact guard assistance) 5 (Supervision) (assist with 02 tank) Distance 40 Feet (ft) 200 Feet (ft) Functional Level 1 5 Comments slow cont partial step through gait pattern with trunk flexion, increased base of support with increased hip ext rot, decreased ankle PF and knee flexion at terminal stance and decreased knee ext and ankle DF at initial contact Able to ambulate 50ft without a device and manage 02 line independently Verbal cues to control breathing pattern with gait speed Ambulation assistance - unlevel surface 0 (Not tested) 5 (Stand-by assistance) (with RW) STEPS/STAIRS Initial Assessment Discharge Assessment Steps/Stairs ambulated 4 4 Rail Use Right  Left Functional Level 2 2 (supervision) Comments slow reciprocating pattern going up steps and single step at a time going down steps leading down with LLE. Increased time and effort to complete with cues for actiity pacing and safe foot placement slow reciprocating pattern going up steps and single step at a time going down steps. Increased time and effort to complete with 2 min sitting rest break after going up steps Curbs/Ramps 0 (Not tested) 5 (Stand-by assistance) (up/down 10 ft ramp with rollator) QUALITY INDICATOR ASSIST COMMENTS Walk 10 feet Without a device and modified independence Walk 50 feet with 2 turns Without a device and modified independence Walk 150 feet With rollator and supervision Walk 10 feet on uneven  With rollator and SBA   
1 step/curb With hand rail and supervision 4 steps With hand rail and supervision 12 steps Unable due to decreased endurance  object SBA Wheel 50' w/2 turns NA Wheel 150' NA   
 
 
  
 
PHYSICAL THERAPY PLAN OF CARE 
 
LTGs: patient met all goals per eval. Refer to care plan for details Pt would benefit from continued skilled physical therapy in order to improve independent functional mobility within the home with use of least restrictive device. Interventions may include range of motion (AROM, PROM B LE/trunk), motor function (B LE/trunk strengthening/coordination), activity tolerance (vitals, oxygen saturation levels), bed mobility training, balance activities, gait training (progressive ambulation program), and functional transfer training. HEP handout:Issued written LE HEP and green theraband, Min assist to complete SEATED EXERCISES Sets Reps Comments Ankle Pumps 1 20 Hip Flexion 2 10 [address] 2 10 Hip Adduction/Ball Squeeze 1 20 Hip Abduction with green Theraband 2 10 Hamstring Curls with green Theraband 2 10 Hip Extension with green Theraband 3 10 Pt to be discharged 09/21/18 with assistance provided by [facility] and daughters. No family training Therapy Recommendations upon discharge: Home Health PT Equipment needs at discharge: Eusebia provided a rollator Please see IRC; Interdisciplinary Eval, Care Plan, and Patient Education for further information regarding physical therapy discharge summary and plan of care. Patient returned to room at end of treatment and remained up in recliner with LEs elevated and with needs in reach. 02 at 3 lpm 
 
Daniel Clark, PT 
9/20/2018

## 2018-09-21 ENCOUNTER — HOSPITAL ENCOUNTER (OUTPATIENT)
Dept: INFUSION THERAPY | Age: 68
Discharge: HOME | End: 2018-09-21

## 2018-09-21 LAB
ANION GAP SERPL CALC-SCNC: 4 MMOL/L (ref 7–16)
BASOPHILS # BLD: 0 K/UL (ref 0–0.2)
BASOPHILS NFR BLD: 0 % (ref 0–2)
BUN SERPL-MCNC: 44 MG/DL (ref 8–23)
CALCIUM SERPL-MCNC: 9.1 MG/DL (ref 8.3–10.4)
CHLORIDE SERPL-SCNC: 102 MMOL/L (ref 98–107)
CO2 SERPL-SCNC: 35 MMOL/L (ref 21–32)
CREAT SERPL-MCNC: 1.67 MG/DL (ref 0.6–1)
DIFFERENTIAL METHOD BLD: ABNORMAL
EOSINOPHIL # BLD: 0.1 K/UL (ref 0–0.8)
EOSINOPHIL NFR BLD: 2 % (ref 0.5–7.8)
ERYTHROCYTE [DISTWIDTH] IN BLOOD BY AUTOMATED COUNT: 14.5 %
FERRITIN SERPL-MCNC: 77 NG/ML (ref 8–388)
GLUCOSE BLD STRIP.AUTO-MCNC: 102 MG/DL (ref 65–100)
GLUCOSE SERPL-MCNC: 86 MG/DL (ref 65–100)
HCT VFR BLD AUTO: 26.3 % (ref 35.8–46.3)
HGB BLD-MCNC: 8.1 G/DL (ref 11.7–15.4)
IMM GRANULOCYTES # BLD: 0 K/UL (ref 0–0.5)
IMM GRANULOCYTES NFR BLD AUTO: 0 % (ref 0–5)
INR PPP: 2.1
IRON SATN MFR SERPL: 23 %
IRON SERPL-MCNC: 64 UG/DL (ref 35–150)
IRON SERPL-MCNC: 65 UG/DL (ref 35–150)
LYMPHOCYTES # BLD: 1.4 K/UL (ref 0.5–4.6)
LYMPHOCYTES NFR BLD: 25 % (ref 13–44)
MCH RBC QN AUTO: 29.2 PG (ref 26.1–32.9)
MCHC RBC AUTO-ENTMCNC: 30.8 G/DL (ref 31.4–35)
MCV RBC AUTO: 94.9 FL (ref 79.6–97.8)
MONOCYTES # BLD: 0.5 K/UL (ref 0.1–1.3)
MONOCYTES NFR BLD: 9 % (ref 4–12)
NEUTS SEG # BLD: 3.6 K/UL (ref 1.7–8.2)
NEUTS SEG NFR BLD: 63 % (ref 43–78)
NRBC # BLD: 0 K/UL (ref 0–0.2)
PLATELET # BLD AUTO: 184 K/UL (ref 150–450)
PMV BLD AUTO: 10.4 FL (ref 9.4–12.3)
POTASSIUM SERPL-SCNC: 4.1 MMOL/L (ref 3.5–5.1)
PROTHROMBIN TIME: 22.4 SEC (ref 11.5–14.5)
RBC # BLD AUTO: 2.77 M/UL (ref 4.05–5.2)
SODIUM SERPL-SCNC: 141 MMOL/L (ref 136–145)
TIBC SERPL-MCNC: 274 UG/DL (ref 250–450)
WBC # BLD AUTO: 5.7 K/UL (ref 4.3–11.1)

## 2018-09-21 PROCEDURE — 97535 SELF CARE MNGMENT TRAINING: CPT

## 2018-09-21 PROCEDURE — 36415 COLL VENOUS BLD VENIPUNCTURE: CPT

## 2018-09-21 PROCEDURE — 85025 COMPLETE CBC W/AUTO DIFF WBC: CPT

## 2018-09-21 PROCEDURE — 83540 ASSAY OF IRON: CPT

## 2018-09-21 PROCEDURE — 94760 N-INVAS EAR/PLS OXIMETRY 1: CPT

## 2018-09-21 PROCEDURE — 65310000000 HC RM PRIVATE REHAB

## 2018-09-21 PROCEDURE — 94640 AIRWAY INHALATION TREATMENT: CPT

## 2018-09-21 PROCEDURE — 82728 ASSAY OF FERRITIN: CPT

## 2018-09-21 PROCEDURE — 74011250637 HC RX REV CODE- 250/637: Performed by: PHYSICAL MEDICINE & REHABILITATION

## 2018-09-21 PROCEDURE — 77010033678 HC OXYGEN DAILY

## 2018-09-21 PROCEDURE — 80048 BASIC METABOLIC PNL TOTAL CA: CPT

## 2018-09-21 PROCEDURE — 74011250636 HC RX REV CODE- 250/636: Performed by: NURSE PRACTITIONER

## 2018-09-21 PROCEDURE — 82962 GLUCOSE BLOOD TEST: CPT

## 2018-09-21 PROCEDURE — 85610 PROTHROMBIN TIME: CPT

## 2018-09-21 PROCEDURE — 74011000250 HC RX REV CODE- 250: Performed by: PHYSICAL MEDICINE & REHABILITATION

## 2018-09-21 PROCEDURE — 99239 HOSP IP/OBS DSCHRG MGMT >30: CPT | Performed by: PHYSICAL MEDICINE & REHABILITATION

## 2018-09-21 RX ADMIN — TIOTROPIUM BROMIDE 18 MCG: 18 CAPSULE ORAL; RESPIRATORY (INHALATION) at 05:44

## 2018-09-21 RX ADMIN — ALBUTEROL SULFATE 2.5 MG: 2.5 SOLUTION RESPIRATORY (INHALATION) at 05:42

## 2018-09-21 RX ADMIN — PANTOPRAZOLE SODIUM 40 MG: 40 TABLET, DELAYED RELEASE ORAL at 05:57

## 2018-09-21 RX ADMIN — ISOSORBIDE MONONITRATE 30 MG: 30 TABLET, EXTENDED RELEASE ORAL at 08:17

## 2018-09-21 RX ADMIN — BUDESONIDE 500 MCG: 0.5 INHALANT RESPIRATORY (INHALATION) at 05:42

## 2018-09-21 RX ADMIN — OXYCODONE HYDROCHLORIDE 10 MG: 5 TABLET ORAL at 10:25

## 2018-09-21 RX ADMIN — ALBUTEROL SULFATE 2.5 MG: 2.5 SOLUTION RESPIRATORY (INHALATION) at 18:05

## 2018-09-21 RX ADMIN — WARFARIN SODIUM 6 MG: 5 TABLET ORAL at 18:16

## 2018-09-21 RX ADMIN — ASPIRIN 81 MG: 81 TABLET, COATED ORAL at 08:17

## 2018-09-21 RX ADMIN — CARVEDILOL 6.25 MG: 6.25 TABLET, FILM COATED ORAL at 08:17

## 2018-09-21 RX ADMIN — ERYTHROPOIETIN 60000 UNITS: 40000 INJECTION, SOLUTION INTRAVENOUS; SUBCUTANEOUS at 16:15

## 2018-09-21 RX ADMIN — CARVEDILOL 6.25 MG: 6.25 TABLET, FILM COATED ORAL at 16:44

## 2018-09-21 RX ADMIN — FUROSEMIDE 40 MG: 40 TABLET ORAL at 08:17

## 2018-09-21 RX ADMIN — SIMVASTATIN 20 MG: 20 TABLET, FILM COATED ORAL at 22:25

## 2018-09-21 RX ADMIN — ESCITALOPRAM OXALATE 20 MG: 10 TABLET ORAL at 08:17

## 2018-09-21 RX ADMIN — TRAZODONE HYDROCHLORIDE 50 MG: 50 TABLET ORAL at 22:25

## 2018-09-21 RX ADMIN — OXYCODONE HYDROCHLORIDE 10 MG: 5 TABLET ORAL at 22:24

## 2018-09-21 RX ADMIN — BUDESONIDE 500 MCG: 0.5 INHALANT RESPIRATORY (INHALATION) at 18:05

## 2018-09-21 RX ADMIN — OXYCODONE HYDROCHLORIDE AND ACETAMINOPHEN 500 MG: 500 TABLET ORAL at 08:17

## 2018-09-21 RX ADMIN — ALBUTEROL SULFATE 2.5 MG: 2.5 SOLUTION RESPIRATORY (INHALATION) at 11:30

## 2018-09-21 NOTE — PROGRESS NOTES
Warfarin dosing per pharmacist 
 
Sonido Cannno Mckinley Godoy is a 77 y.o. female. Indication:  S/p AVR Goal INR:  2-3 Home dose:  5 mg Sun and 2.5 mg all other days  (Weekly dose of 20 mg) Risk factors or significant drug interactions:  none Other anticoagulants:  Lovenox bridge Daily Monitoring Date  INR     Warfarin dose  HGB              Notes 9/8  3.4  Hold   9.3 
9/9  2.8  Hold   9.0  
9/10  2.0  Hold   9.6 9/11  1.5  5 mg    9.5 9/12  1.3  5 mg   10.5 9/13  1.3  5 mg   --- 
9/14  1.3  (15 mg)*  ---  *patient mistakenly given 7.5 mg dose X 2, Abner Cui entered 9/15  1.4  Hold   --- 
9/16  1.6  5 mg   --- 
9/17  1.6  5 mg   --- 
9/18  1.7  5 mg   --- 
9/19  1.8  7.5 mg   --- 
9/20  2.0  6 mg   --- 
9/21  2.1  6 mg   8.1 Pharmacy has been consulted to dose warfarin for this patient due to a history of AVR (mechanical). The INR goal per the patient's home warfarin clinic is 2-3. Patient initially presented with a supratherapeutic INR that has trended down. INR 2.1. Patient has had higher warfarin requirements compared to above home dosing. Continue warfarin 6 mg tonight. Daily INR. Pharmacy will continue to follow. Please call with any questions. Thank you, Keely Barton, PharmD Clinical Pharmacist 
[phone]

## 2018-09-21 NOTE — PROGRESS NOTES
Pt is being discharged home today with [facility]: RN/PT/Wound Care with Rollator from [address] [place] [address] at bedside. Care Management Interventions PCP Verified by CM: Yes Mode of Transport at Discharge: Other (see comment) (Family) Transition of Care Consult (CM Consult): Discharge Planning, Home Health [facility] - INPATIENT: Yes Discharge Durable Medical Equipment: Yes (Rollator from [address] [place] [address]) Physical Therapy Consult: Yes Occupational Therapy Consult: Yes Current Support Network: Own Home, Family Lives [place] Confirm Follow Up Transport: Family Plan discussed with Pt/Family/Caregiver: Yes Freedom of Choice Offered: Yes Discharge Location Discharge Placement: Home with home health

## 2018-09-21 NOTE — PROGRESS NOTES
OT DISCHARGE REPORT Time In: 6240 Time Out: 9055 COMPREHENSION MODE Initial Assessment Discharge Assessment 9/21/2018 Score 7 7 EXPRESSION Initial Assessment Discharge Assessment 9/21/2018 Primary Mode of Expression Verbal Verbal  
Score 7 7 Comments WNL No issues noted SOCIAL INTERACTION/ PRAGMATICS Initial Assessment Discharge Assessment 9/21/2018 Score 7 7 Comments Pleasant and agreeable Appropriate PROBLEM SOLVING Initial Assessment Discharge Assessment 9/21/2018 Score 6 7 Comments Increased time No issues noted MEMORY Initial Assessment Discharge Assessment 9/21/2018 Score 6 7 Comments Increased time No issues noted BATHING Initial Assessment Discharge Assessment 9/21/2018 Functional Level 3 6 Body parts patient bathed Abdomen, Arm, left, Arm, right, Chest, Aura area, Thigh, left, Thigh, right Abdomen;Arm, right;Arm, left; Buttocks; Chest;Lower leg and foot, left; Lower leg and foot, right;Aura area; Thigh, left; Thigh, right Comments Assist to bathe B feet and bottom d/t inability to reach, CGA-S for sit to stand and standing balance LHS; bath mitt TUB/SHOWER TRANSFER INDEPENDENCE Initial Assessment Discharge Assessment 9/21/2018 Score  7 Type of Shower: Shower Adaptive  Equipment:Tub transfer bench and Grab bars Comments  I   
 
UPPER BODY DRESSING/UNDRESSING Initial Assessment Discharge Assessment 9/21/2018 Functional Level 2 4 Items applied/Steps completed Pullover (4 steps) Pullover (4 steps) Comments Assist to start B arms through sleeves and help overhead A to pull down in back LOWER BODY DRESSING/UNDRESSING Initial Assessment Discharge Assessment 9/21/2018 Functional Level 1 7 Items applied/Steps completed Sock, left (1 step), Sock, right (1 step), Elastic waist pants (3 steps) Shoe, left (1 step); Shoe, right (1 step); Underpants (3 steps) Comments Patient able to participate in managing pants over hips only I  
 
 TOILETING Initial Assessment Discharge Assessment 9/21/2018 Functional Level 2 7 Comments Assist for pants on and off hips I  
 
TOILET TRANSFER INDEPENDENCE Initial Assessment Discharge Assessment 9/21/2018 Transfer score 4 7 Comments CGA SPT wheelchair <> toilet using grab bar I Plan of Care: Please see Care Plan for progress towards goals during stay Discharge Location: Home Family Training: Not indicated Recommendations/Functional Level:     Pt can complete self-care with occasional A. Recommended Continuing Therapy:  Skilled OT not indicated at this time Residual Deficits:  Decreased AROM, vision, and activity tolerance Progress over LOS: Pt has made improvements in balance, activity tolerance, and safety awareness to improve bathing, dressing, transfers, and toileting allowing pt to return home safely. Summary of Session: Pt was in bed and agreeable to tx with prodding. Pt's performance with ADL is reflected in above chart. Pt was left at EOB with all needs within reach. Discussed discharge with Dr. Alicja Carrera. Tariq Reinoso OTR/L 
9/21/2018

## 2018-09-21 NOTE — PROGRESS NOTES
Pt very drowsy this morning. She usually awake laughing and joking. This morning she is hard to arouse and moving very slow. RN was able to get awake and she is sitting on the side of bed eating breakfast. Pt vital signes are stable. No s/sx of distress. Call light with in reach

## 2018-09-21 NOTE — PROGRESS NOTES
Pt received 60,000units of Epogen,Procrit sub Q to abdomen. Pt chose med to abdomen. All Lab were completed that were required before giving  medication  Pt tolerated well. Assisted pt up for dinner meal. Pt encourage to call for any needs. Call light with in reach.

## 2018-09-21 NOTE — PROGRESS NOTES
Problem: Falls - Risk of 
Goal: *Absence of Falls Document Kristyn Resendiz Fall Risk and appropriate interventions in the flowsheet. Outcome: Progressing Towards Goal 
Fall Risk Interventions: 
Mobility Interventions: Utilize gait belt for transfers/ambulation, Utilize walker, cane, or other assistive device, Patient to call before getting OOB, Communicate number of staff needed for ambulation/transfer Medication Interventions: Teach patient to arise slowly, Utilize gait belt for transfers/ambulation, Patient to call before getting OOB, Evaluate medications/consider consulting pharmacy Elimination Interventions: Toileting schedule/hourly rounds, Elevated toilet seat, Call light in reach, Patient to call for help with toileting needs, Toilet paper/wipes in reach History of Falls Interventions: Evaluate medications/consider consulting pharmacy, Utilize gait belt for transfer/ambulation, Door open when patient unattended

## 2018-09-21 NOTE — PROGRESS NOTES
Daily Addendum;  
 
I am hold dc today. Plan was to allow her dc after she receives an Epocrit 16K dose. This had been discussed with Oncology. However, she is feeling weak, sleepy and says she is dizzy. I do not feel comfortable allowing her to dc. Will go ahead and give epocrit after labs sent for iron studies. Her VS are stable, AAOX4. Exam unchanged. If labs stable in a.m and pt feeling well. Can dc as planned. All dc meds and orders remain the same. All appts scheduled. This was d/w dtg and pt who are in full agreement with plan Junito Meza MD

## 2018-09-21 NOTE — PROGRESS NOTES
End Of Shift Functional Summary, Nursing TOILETING:   
Does patient need assist with adjusting pants up or down and/or pericare? yes If yes, please indicate what the patient needs help with:  Pulling pants up   (i.e. pants up, pants down, pericare) Pt uses wheelchair. Does the patient require extra time? yes Does the patient require standby assistance? yes Does the patient require contact guard assistance? no 
Does the patient require more than one staff member for assistance? no 
 
TOILET TRANSFER:   
Pt requires minimal assistance. Pt uses walker. Does the patient require extra time? yes Does the patient require contact guard assistance? no 
Does the patient require more than one staff member for assistance? no 
 
BLADDER:   
Pt does not have a medina catheter that staff manages. Pt does not take medication. If so, please indicate which medication:   
Pt is continent. of bladder and voids in bedside commode  Pt requires staff to empty device Pt has had 0 bladder accidents during this shift.  (An accident is when the episode is not contained in a brief AND/OR the clothing/linen requires changing/cleaning up.) BOWEL:  Pt does take medication. Pt is continent of bowel and uses toilet. Pt has had 0 bowel accidents during this shift . (An accident is when the episode is not contained in a brief AND/OR the clothing/linen requires changing/cleaning up.) BED/CHAIR TRANSFER Pt requires standby assistance/setup. Pt uses walker Does the patient require extra time? yes Does the patient require contact guard assistance? no 
Does the patient require more than one staff member for assistance? no 
 
 
 
 
Does the patient require more than one staff member for assistance? no 
 
 
 
 
EATING Pt requires no assistance. Pt does not wear dentures. TUBE FEEDINGS:  Pt does not  receive nutrition through tube feedings. Patient requires no assistance with feedings. Documentation reviewed and plan of care discussed/reviewed with  
patient during the shift.

## 2018-09-22 LAB
ERYTHROCYTE [DISTWIDTH] IN BLOOD BY AUTOMATED COUNT: 14.6 %
HCT VFR BLD AUTO: 29.1 % (ref 35.8–46.3)
HGB BLD-MCNC: 8.7 G/DL (ref 11.7–15.4)
INR PPP: 2
MCH RBC QN AUTO: 28.7 PG (ref 26.1–32.9)
MCHC RBC AUTO-ENTMCNC: 29.9 G/DL (ref 31.4–35)
MCV RBC AUTO: 96 FL (ref 79.6–97.8)
NRBC # BLD: 0 K/UL (ref 0–0.2)
PLATELET # BLD AUTO: 215 K/UL (ref 150–450)
PMV BLD AUTO: 10.9 FL (ref 9.4–12.3)
PROTHROMBIN TIME: 21.9 SEC (ref 11.5–14.5)
RBC # BLD AUTO: 3.03 M/UL (ref 4.05–5.2)
WBC # BLD AUTO: 5.8 K/UL (ref 4.3–11.1)

## 2018-09-22 PROCEDURE — 65310000000 HC RM PRIVATE REHAB

## 2018-09-22 PROCEDURE — 94760 N-INVAS EAR/PLS OXIMETRY 1: CPT

## 2018-09-22 PROCEDURE — 77010033678 HC OXYGEN DAILY

## 2018-09-22 PROCEDURE — 30233N1 TRANSFUSION OF NONAUTOLOGOUS RED BLOOD CELLS INTO PERIPHERAL VEIN, PERCUTANEOUS APPROACH: ICD-10-PCS | Performed by: PHYSICAL MEDICINE & REHABILITATION

## 2018-09-22 PROCEDURE — 77030020263 HC SOL INJ SOD CL0.9% LFCR 1000ML

## 2018-09-22 PROCEDURE — 85610 PROTHROMBIN TIME: CPT

## 2018-09-22 PROCEDURE — 86901 BLOOD TYPING SEROLOGIC RH(D): CPT

## 2018-09-22 PROCEDURE — 85027 COMPLETE CBC AUTOMATED: CPT

## 2018-09-22 PROCEDURE — 94640 AIRWAY INHALATION TREATMENT: CPT

## 2018-09-22 PROCEDURE — 36415 COLL VENOUS BLD VENIPUNCTURE: CPT

## 2018-09-22 PROCEDURE — 86923 COMPATIBILITY TEST ELECTRIC: CPT

## 2018-09-22 PROCEDURE — 36430 TRANSFUSION BLD/BLD COMPNT: CPT

## 2018-09-22 PROCEDURE — P9040 RBC LEUKOREDUCED IRRADIATED: HCPCS

## 2018-09-22 PROCEDURE — 74011250637 HC RX REV CODE- 250/637: Performed by: PHYSICAL MEDICINE & REHABILITATION

## 2018-09-22 PROCEDURE — 86644 CMV ANTIBODY: CPT

## 2018-09-22 PROCEDURE — 74011000250 HC RX REV CODE- 250: Performed by: PHYSICAL MEDICINE & REHABILITATION

## 2018-09-22 RX ORDER — SODIUM CHLORIDE 9 MG/ML
250 INJECTION, SOLUTION INTRAVENOUS AS NEEDED
Status: DISCONTINUED | OUTPATIENT
Start: 2018-09-22 | End: 2018-09-23 | Stop reason: HOSPADM

## 2018-09-22 RX ADMIN — TIOTROPIUM BROMIDE 18 MCG: 18 CAPSULE ORAL; RESPIRATORY (INHALATION) at 05:35

## 2018-09-22 RX ADMIN — ISOSORBIDE MONONITRATE 30 MG: 30 TABLET, EXTENDED RELEASE ORAL at 08:43

## 2018-09-22 RX ADMIN — BUDESONIDE 500 MCG: 0.5 INHALANT RESPIRATORY (INHALATION) at 05:34

## 2018-09-22 RX ADMIN — ASPIRIN 81 MG: 81 TABLET, COATED ORAL at 08:43

## 2018-09-22 RX ADMIN — ALBUTEROL SULFATE 2.5 MG: 2.5 SOLUTION RESPIRATORY (INHALATION) at 17:45

## 2018-09-22 RX ADMIN — ACETAMINOPHEN 650 MG: 325 TABLET ORAL at 16:54

## 2018-09-22 RX ADMIN — ESCITALOPRAM OXALATE 20 MG: 10 TABLET ORAL at 08:43

## 2018-09-22 RX ADMIN — WARFARIN SODIUM 6 MG: 5 TABLET ORAL at 16:55

## 2018-09-22 RX ADMIN — PANTOPRAZOLE SODIUM 40 MG: 40 TABLET, DELAYED RELEASE ORAL at 05:33

## 2018-09-22 RX ADMIN — OXYCODONE HYDROCHLORIDE 10 MG: 5 TABLET ORAL at 16:54

## 2018-09-22 RX ADMIN — SIMVASTATIN 20 MG: 20 TABLET, FILM COATED ORAL at 21:48

## 2018-09-22 RX ADMIN — OXYCODONE HYDROCHLORIDE 10 MG: 5 TABLET ORAL at 21:48

## 2018-09-22 RX ADMIN — OXYCODONE HYDROCHLORIDE 10 MG: 5 TABLET ORAL at 10:35

## 2018-09-22 RX ADMIN — BUDESONIDE 500 MCG: 0.5 INHALANT RESPIRATORY (INHALATION) at 17:45

## 2018-09-22 RX ADMIN — TRAZODONE HYDROCHLORIDE 50 MG: 50 TABLET ORAL at 21:48

## 2018-09-22 RX ADMIN — CARVEDILOL 6.25 MG: 6.25 TABLET, FILM COATED ORAL at 08:43

## 2018-09-22 RX ADMIN — CARVEDILOL 6.25 MG: 6.25 TABLET, FILM COATED ORAL at 16:54

## 2018-09-22 RX ADMIN — OXYCODONE HYDROCHLORIDE AND ACETAMINOPHEN 500 MG: 500 TABLET ORAL at 08:43

## 2018-09-22 RX ADMIN — ALBUTEROL SULFATE 2.5 MG: 2.5 SOLUTION RESPIRATORY (INHALATION) at 12:07

## 2018-09-22 RX ADMIN — ALBUTEROL SULFATE 2.5 MG: 2.5 SOLUTION RESPIRATORY (INHALATION) at 05:35

## 2018-09-22 RX ADMIN — FUROSEMIDE 40 MG: 40 TABLET ORAL at 08:43

## 2018-09-22 NOTE — PROGRESS NOTES
Report received on pt's history and status from previous shift nurse Tolu Ramirez. Pt sleeping in recliner. Assessment completed and noted on flowsheet. Denies wanting pain medication at this time. States discomfort is gone at this time. POC explained. Questions and concerns addressed

## 2018-09-22 NOTE — PROGRESS NOTES
Dulce Maria Faye MD, Medical Director Marcel Schwartz [address], [address] [facility] Tel: [phone] SFD PROGRESS NOTE [address] Admit Date: 9/11/2018 Admit Diagnosis: debility Physical debility Subjective Doing well. PT says she can have \"green socks\" . Walked 400ft with one rest break only. No cp, less PRUETT , no sob at rest. 
 
Reports continued dizziness and fatigue. Patient states oncology wants Hgb ~10. Denies palpations, SOB at rest or nausea. Has tolerated blood transfusions previously. Objective:  
 
Current Facility-Administered Medications Medication Dose Route Frequency  warfarin (COUMADIN) tablet 6 mg  6 mg Oral QPM  
 furosemide (LASIX) tablet 40 mg  40 mg Oral DAILY  traZODone (DESYREL) tablet 25-50 mg  25-50 mg Oral QHS  
 0.9% sodium chloride infusion 250 mL  250 mL IntraVENous PRN  
 acetaminophen (TYLENOL) tablet 650 mg  650 mg Oral Q6H PRN  
 sodium chloride (NS) flush 5-10 mL  5-10 mL IntraVENous PRN  
 albuterol (PROVENTIL VENTOLIN) nebulizer solution 2.5 mg  2.5 mg Nebulization Q4H PRN  
 alum-mag hydroxide-simeth (MYLANTA) oral suspension 30 mL  30 mL Oral Q4H PRN  
 ascorbic acid (vitamin C) (VITAMIN C) tablet 500 mg  500 mg Oral DAILY  aspirin delayed-release tablet 81 mg  81 mg Oral DAILY  bisacodyl (DULCOLAX) suppository 10 mg  10 mg Rectal DAILY PRN  
 budesonide (PULMICORT) 500 mcg/2 ml nebulizer suspension  500 mcg Nebulization BID RT And  
 albuterol (PROVENTIL VENTOLIN) nebulizer solution 2.5 mg  2.5 mg Nebulization Q6HWA RT  
 carvedilol (COREG) tablet 6.25 mg  6.25 mg Oral BID WITH MEALS  escitalopram oxalate (LEXAPRO) tablet 20 mg  20 mg Oral DAILY  influenza vaccine 2018-19 (6 mos+)(PF) (FLUARIX QUAD/FLULAVAL QUAD) injection 0.5 mL  0.5 mL IntraMUSCular PRIOR TO DISCHARGE  isosorbide mononitrate ER (IMDUR) tablet 30 mg  30 mg Oral DAILY  ondansetron (ZOFRAN ODT) tablet 4 mg  4 mg Oral Q6H PRN  
 oxyCODONE IR (ROXICODONE) tablet 5-10 mg  5-10 mg Oral Q4H PRN  pantoprazole (PROTONIX) tablet 40 mg  40 mg Oral ACB  polyethylene glycol (MIRALAX) packet 17 g  17 g Oral DAILY PRN  
 simvastatin (ZOCOR) tablet 20 mg  20 mg Oral QHS  tiotropium (SPIRIVA) inhalation capsule 18 mcg  18 mcg Inhalation DAILY  traMADol (ULTRAM) tablet 50 mg  50 mg Oral Q4H PRN Review of Systems:Denies chest pain, shortness of breath, cough, headache, visual problems, abdominal pain, dysurea, calf pain. Pertinent positives are as noted in the medical records and unremarkable otherwise. Visit Vitals  /84 (BP 1 Location: Left arm)  Pulse 65  Temp 98.5 °F (36.9 °C)  Resp 17  Wt 97.3 kg (214 lb 8 oz)  SpO2 (!) 88%  BMI 39.23 kg/m2 Physical Exam:  
General: Alert and age appropriately oriented. No acute cardio respiratory distress. HEENT: Normocephalic,no scleral icterus; poor visual acuity;  
Oral mucosa moist without cyanosis Lungs: Clear to auscultation  bilaterally. Respiration even and unlabored Heart: Regular rate and rhythm, S1, S2 No  murmurs, clicks, rub or gallops Abdomen: Soft, non-tender, nondistended. Bowel sounds present. obese Neuromuscular:  
 
 Grossly no focal motor deficits noted. Moves ankles. Skin/extremity: No rashes, no erythema. No calf tenderness BLE Wound LLE still with serosang drainage. No erythema or foul odor No edema Functional Assessment: 
Gross Assessment AROM: Generally decreased, functional (09/18/18 1600) Strength: Generally decreased, functional (09/18/18 1600) Balance Sitting - Static: Good (unsupported) (09/20/18 1600) Sitting - Dynamic: Good (unsupported) (09/20/18 1600) Standing - Static: Good (09/20/18 1600) Standing - Dynamic : Impaired (09/20/18 1200) Sen Hunt Fall Risk Assessment: 
Elsa Espan[place] Risk Mobility: Ambulates or transfers with assist devices or assistance (09/22/18 1058) Mobility Interventions: Utilize gait belt for transfers/ambulation (09/21/18 2327) Mentation: Alert, oriented x 3 (09/22/18 1058) Medication: Patient receiving anticonvulsants, sedatives(tranquilizers), psychotropics or hypnotics, hypoglycemics, narcotics, sleep aids, antihypertensives, laxatives, or diuretics (09/22/18 1058) Medication Interventions: Teach patient to arise slowly (09/21/18 2327) Elimination: Needs assistance with toileting (09/22/18 1058) Elimination Interventions: Toilet paper/wipes in reach (09/21/18 2327) Prior Fall History: Before admission in past 12 months _home or previous inpatient care) (09/22/18 1058) History of Falls Interventions: Evaluate medications/consider consulting pharmacy (09/21/18 2327) Total Score: 4 (09/22/18 1058) Standard Fall Precautions: Yes (09/18/18 2042) High Fall Risk: Yes (09/22/18 1058) Speech Assessment: 
    
 
Ambulation: 
Gait Distance (ft): 200 Feet (ft) (09/20/18 1600) Assistive Device: Walker, rollator (09/20/18 1600) Rail Use: Left  (09/20/18 1600) Labs/Studies: 
Recent Results (from the past 72 hour(s)) GLUCOSE, POC Collection Time: 09/19/18  3:54 PM  
Result Value Ref Range Glucose (POC) 126 (H) 65 - 100 mg/dL GLUCOSE, POC Collection Time: 09/19/18  8:28 PM  
Result Value Ref Range Glucose (POC) 173 (H) 65 - 100 mg/dL PROTHROMBIN TIME + INR Collection Time: 09/20/18  6:00 AM  
Result Value Ref Range Prothrombin time 21.8 (H) 11.5 - 14.5 sec INR 2.0 GLUCOSE, POC Collection Time: 09/20/18  3:43 PM  
Result Value Ref Range Glucose (POC) 147 (H) 65 - 100 mg/dL PROTHROMBIN TIME + INR Collection Time: 09/21/18  5:39 AM  
Result Value Ref Range Prothrombin time 22.4 (H) 11.5 - 14.5 sec INR 2.1    
CBC WITH AUTOMATED DIFF  Collection Time: 09/21/18  5:39 AM  
 Result Value Ref Range WBC 5.7 4.3 - 11.1 K/uL  
 RBC 2.77 (L) 4.05 - 5.2 M/uL HGB 8.1 (L) 11.7 - 15.4 g/dL HCT 26.3 (L) 35.8 - 46.3 % MCV 94.9 79.6 - 97.8 FL  
 MCH 29.2 26.1 - 32.9 PG  
 MCHC 30.8 (L) 31.4 - 35.0 g/dL  
 RDW 14.5 % PLATELET 573 441 - 219 K/uL MPV 10.4 9.4 - 12.3 FL ABSOLUTE NRBC 0.00 0.0 - 0.2 K/uL  
 DF AUTOMATED NEUTROPHILS 63 43 - 78 % LYMPHOCYTES 25 13 - 44 % MONOCYTES 9 4.0 - 12.0 % EOSINOPHILS 2 0.5 - 7.8 % BASOPHILS 0 0.0 - 2.0 % IMMATURE GRANULOCYTES 0 0.0 - 5.0 %  
 ABS. NEUTROPHILS 3.6 1.7 - 8.2 K/UL  
 ABS. LYMPHOCYTES 1.4 0.5 - 4.6 K/UL  
 ABS. MONOCYTES 0.5 0.1 - 1.3 K/UL  
 ABS. EOSINOPHILS 0.1 0.0 - 0.8 K/UL  
 ABS. BASOPHILS 0.0 0.0 - 0.2 K/UL  
 ABS. IMM. GRANS. 0.0 0.0 - 0.5 K/UL METABOLIC PANEL, BASIC Collection Time: 09/21/18  5:39 AM  
Result Value Ref Range Sodium 141 136 - 145 mmol/L Potassium 4.1 3.5 - 5.1 mmol/L Chloride 102 98 - 107 mmol/L  
 CO2 35 (H) 21 - 32 mmol/L Anion gap 4 (L) 7 - 16 mmol/L Glucose 86 65 - 100 mg/dL BUN 44 (H) 8 - 23 MG/DL Creatinine 1.67 (H) 0.6 - 1.0 MG/DL  
 GFR est AA 39 (L) >60 ml/min/1.73m2 GFR est non-AA 32 (L) >60 ml/min/1.73m2 Calcium 9.1 8.3 - 10.4 MG/DL  
GLUCOSE, POC Collection Time: 09/21/18  7:39 AM  
Result Value Ref Range Glucose (POC) 102 (H) 65 - 100 mg/dL IRON Collection Time: 09/21/18  2:52 PM  
Result Value Ref Range Iron 65 35 - 150 ug/dL FERRITIN Collection Time: 09/21/18  2:52 PM  
Result Value Ref Range Ferritin 77 8 - 388 NG/ML  
TRANSFERRIN SATURATION Collection Time: 09/21/18  2:52 PM  
Result Value Ref Range Iron 64 35 - 150 ug/dL TIBC 274 250 - 450 ug/dL Transferrin Saturation 23 >20 % PROTHROMBIN TIME + INR Collection Time: 09/22/18  6:47 AM  
Result Value Ref Range Prothrombin time 21.9 (H) 11.5 - 14.5 sec INR 2.0    
CBC W/O DIFF Collection Time: 09/22/18 11:49 AM  
Result Value Ref Range WBC 5.8 4.3 - 11.1 K/uL  
 RBC 3.03 (L) 4.05 - 5.2 M/uL HGB 8.7 (L) 11.7 - 15.4 g/dL HCT 29.1 (L) 35.8 - 46.3 % MCV 96.0 79.6 - 97.8 FL  
 MCH 28.7 26.1 - 32.9 PG  
 MCHC 29.9 (L) 31.4 - 35.0 g/dL  
 RDW 14.6 % PLATELET 140 044 - 879 K/uL MPV 10.9 9.4 - 12.3 FL ABSOLUTE NRBC 0.00 0.0 - 0.2 K/uL Assessment:  
 
Problem List as of 9/22/2018  Date Reviewed: 8/31/2018 Codes Class Noted - Resolved Coagulopathy ([facility] INR >4 on admission 9/7/18 ICD-10-CM: D68.9 ICD-9-CM: 286.9  9/8/2018 - Present Traumatic hematoma of left knee ICD-10-CM: E09.06ZT ICD-9-CM: 924.11  9/8/2018 - Present Debility ICD-10-CM: R53.81 ICD-9-CM: 799.3  9/8/2018 - Present Anemia ICD-10-CM: D64.9 ICD-9-CM: 285.9  9/7/2018 - Present Chronic respiratory failure with hypoxia (HCC) (Chronic) ICD-10-CM: J96.11 
ICD-9-CM: 518.83, 799.02  9/7/2018 - Present Acute kidney injury superimposed on chronic kidney disease ([facility].) ICD-10-CM: N17.9, N18.9 ICD-9-CM: 866.00, 585.9  9/7/2018 - Present Encounter for immunization ICD-10-CM: K18 ICD-9-CM: V03.89  8/21/2018 - Present Obesity, morbid ([facility].) ICD-10-CM: E66.01 
ICD-9-CM: 278.01  6/8/2018 - Present * (Principal)Physical debility ICD-10-CM: R53.81 ICD-9-CM: 799.3  5/2/2018 - Present Closed nondisplaced fracture of shaft of fifth metacarpal bone of left hand ICD-10-CM: Q25.793Q ICD-9-CM: 815.03  4/28/2018 - Present Subdural hematoma (HCC) ICD-10-CM: I62.00 ICD-9-CM: 432.1  4/27/2018 - Present Long term (current) use of anticoagulants (Chronic) ICD-10-CM: Z79.01 
ICD-9-CM: V58.61  4/19/2018 - Present Dysthymia ICD-10-CM: F34.1 ICD-9-CM: 300.4  4/5/2018 - Present Type 2 diabetes mellitus with nephropathy (HCC) (Chronic) ICD-10-CM: E11.21 
ICD-9-CM: 250.40, 583.81  12/18/2017 - Present Pulmonary hypertension (HCC) (Chronic) ICD-10-CM: I27.20 ICD-9-CM: 416.8  6/15/2016 - Present S/P AVR (aortic valve replacement) (Chronic) ICD-10-CM: Z95.2 ICD-9-CM: V43.3  Unknown - Present Overview Signed 2/23/2016 11:04 AM by Aneesh Botello Mechanical/Artific Cardiomyopathy ([facility] (Chronic) ICD-10-CM: I42.9 ICD-9-CM: 425.4  Unknown - Present Osteopenia ICD-10-CM: M85.80 ICD-9-CM: 733.90  Unknown - Present HLD (hyperlipidemia) (Chronic) ICD-10-CM: W51.9 ICD-9-CM: 272.4  Unknown - Present Osteoarthritis ICD-10-CM: M19.90 ICD-9-CM: 715.90  Unknown - Present  
   
 ICD (implantable cardioverter-defibrillator) in place ICD-10-CM: Z95.810 ICD-9-CM: V45.02  10/2/2014 - Present Overview Signed 10/2/2014  4:58 PM by José Matute III [employer] single-chamber ICD implantation 10/20/14 Diabetes mellitus type 2, diet-controlled (HCC) (Chronic) ICD-10-CM: E11.9 ICD-9-CM: 250.00  8/28/2014 - Present COPD (chronic obstructive pulmonary disease) (HCC) (Chronic) ICD-10-CM: J44.9 ICD-9-CM: 359  4/2/2014 - Present REJI (obstructive sleep apnea)-cpap (Chronic) ICD-10-CM: G47.33 
ICD-9-CM: 327.23  4/2/2014 - Present CKD (chronic kidney disease) stage 3, GFR 30-59 ml/min (Chronic) ICD-10-CM: N18.3 ICD-9-CM: 585.3  7/10/2013 - Present Anticoagulated on Coumadin (Chronic) ICD-10-CM: Z51.81, Z79.01 
ICD-9-CM: V58.83, V58.61  7/9/2013 - Present Overview Signed 7/9/2013  4:16 PM by Karthik Montaño NP  
  S/P AVR 
  
  
   
 CAD (coronary artery disease) (Chronic) ICD-10-CM: I25.10 ICD-9-CM: 414.00  1/20/2013 - Present Overview Signed 5/20/2014 10:05 AM by Demi Escoto NP  
  5/8/14 PCI LAD with stent placed Chronic combined systolic and diastolic heart failure (HCC) (Chronic) ICD-10-CM: I50.42 
ICD-9-CM: 428.42  1/20/2013 - Present Overview Addendum 4/28/2018  4:53 AM by Reji Medina MD  
  5/8/14 ECHO:  EF 10-15% 12/2017:  EF 25-30% Essential hypertension, benign (Chronic) ICD-10-CM: I10 
ICD-9-CM: 401.1  1/20/2013 - Present MDS (myelodysplastic syndrome) (HCC) (Chronic) ICD-10-CM: D46.9 ICD-9-CM: 238.75  12/17/2011 - Present Overview Addendum 8/29/2013 11:06 AM by Ashley Nurse Procrit started in August, 2011 12/18/11 Procrit weekly and Iron stores. 5-12 12-13-12 good response to 3 weekly procrit did not need it last time 3-7-13 Pt doing well. Just wanted a \"check-up. \" Responding to Procrit every three weeks. 8-29-13 patient has missed some injections on recently restarted hemoglobin only issue , takes oral iron iron stores each time Iron deficiency anemia due to chronic blood loss (Chronic) ICD-10-CM: D50.0 ICD-9-CM: 280.0  7/29/2009 - Present RESOLVED: Routine general medical examination at a health care facility ICD-10-CM: Z00.00 ICD-9-CM: V70.0  12/16/2016 - 4/27/2018 Assessment: Profound Physical Debility s/p admitted with moderately severe anemia 
   
Continue daily physician medical management: 
Pneumonia prophylaxis- Incentive spirometer every hour while awake 
   
Chronic hypoxic respir failure; cont RT txs. On Trilogy at [facility]. Monitor O2 sats and cont O2 supplementation. Currently on 3L per [place] 
   
Insomnia; add Trazadone, or dtg can bring in her Melatonin 
   
DVT risk / DVT Prophylaxis- Will require daily physician exam to assess for signs and symptoms as patient is at increased risk for of thromboembolism. Mobilization as tolerated. Intermittent pneumatic compression devices when in bed Thigh-high or knee-high thromboembolic deterrent hose when out of bed.  No further anticoag is needed; on therapeutic Lovenox while bridging to coumadin 
-9/12 inr down 1.3 ;pharmacy dosing; 9/13 not changed ; now on 5mg coumadin; cont lovenox until therapeutic 
-9/14 INR still subtherap at 1.3 , thus continue sq Lovenox; pharmacy dosing; 9/17 INR 1.6; pharm dosing; still on 5mg, likely need to increase; would like INR 2.5 to 3 
-9/19 INR 1.7 still subtherapeutic; on 5mg; 9/19 INR 1.8, cont coumadin and lovenox; ? ??inc coumadin to 6mg; pharm is dosing 
-9/20 2.0; received 7.5 mg yesterday. 
   
S/p mech AVR; chronic coumadin therapy but such hi risk of bleeding due to multiple falls.   
   
CHF; cont aldactone, coreg, imdur and lasix; 9/13 pt was on Lasix 80mg bid during acute stay, have dec to 40 mg bid; states she takes 20mg at home; monitor closely for signs of chf exacerb.  
-9/14 compensated; monitor as Lasix has decreased to 40 bid, home dose 20 daily; FELIZ on CKD thus decreasing Lasix; change to 40 daily  
-9/17 no peripheral edema, no s/s of CHF, kidney fxn improving; cont 40mg for now of Lasix; bmp 9/19;improving but mild periph edema, lungs clear. Asking RN to weigh. 214 same as admission wt 
   
Pain Control: ongoing significant pain in knees which is stable and controlled by PRN meds. Will require regular pain assessment and comprenhensive pain management,  
   
Wound Care: Monitor wound status daily per staff and physician. At risk for failure. Will require 24/7 rehab nursing. Keep wound clean and dry, Reinforce dressing PRN and Ice to area for comfort 
   
Hypertension - BP controlled, fluctuating, managed medically.  
   
Acute on chronic anemia; possible MDS followed by Dr Abdirahman Palma. Monitor closely, sp transfusion. Cont epocrit. Cont ferrous sulfate and vit C; recheck  9/20 . Hgb - 8.1 > 8.7, Fe studies unremarkable. Will transfuse 1 U PRBC d/t symptoms. Recheck cbc in am with planned d/c roxana.  
   
Depression; cont Lexapro. Depression regarding medical cond and fear of losing independence 
   
Diabetes mellitus - controlled. poor glycemic control.  Will require daily, close FSG monitoring and medication adjustment to optimize glycemic control in setting of acute illness and hospitalization.  
-diet controlled so far 
Gruvie 
 Urinary retention/ neurogenic bladder - schedule voids q6-8 hrs. Check post-void residual as needed; In and Out catheterize if post-void residual is more than 400 cc. 
-having urinary incont; will try timed voids due to urgency 
   
CKD stg 3; creat 2.05 on admission to rehab. Was 1.55 yesterday. Recheck in a.m and review meds contributing. 
-9/12 creat is increasing; dec lasix; recheck pending 9/13. 2.48 Today 9/14 creat 2.35, BUN 74; slowly improving with drop in Lasix/CHF well compensated; DAILY BMP 
-creatinine continues to improve with decreased Lasix; no s/s of chf, no peripheral edema and lungs clear; creat 1.69 from 1.96 yest. BUN down to 54 from 65 yesterday. Close to baseline? 
-recheck 9/19. Today no edema, no increased SOB to suggest fluid overload 
-9/19 weigh today. Minimal pedal edema; no sob. Creat and bun improving still 
   
bowel program - add bowel program prn 
   
GERD - add PPI. At times may need additional antacids, Maalox prn. Previous hx of GIB;HH stable 
   
Time spent was 25 minutes with over 1/2 in direct patient care/examination, consultation and coordination of care. Signed By: Nate Newton MD   
 September 22, 2018

## 2018-09-22 NOTE — PROGRESS NOTES
Pt assisted up to bathroom without any dizziness.m C/O discomfort in left leg, medicated as ordered. Assisted to bed, positioned for comfort. Pleasant and alert and oriented x4

## 2018-09-22 NOTE — PROGRESS NOTES
Warfarin dosing per pharmacist 
 
Mili Martinez Rolando Barker is a 77 y.o. female. Indication:  S/p AVR Goal INR:  2-3 Home dose:  5 mg Sun and 2.5 mg all other days  (Weekly dose of 20 mg) Risk factors or significant drug interactions:  none Other anticoagulants:  Lovenox bridge Daily Monitoring Date  INR     Warfarin dose  HGB              Notes 9/8  3.4  Hold   9.3 
9/9  2.8  Hold   9.0  
9/10  2.0  Hold   9.6 9/11  1.5  5 mg    9.5 9/12  1.3  5 mg   10.5 9/13  1.3  5 mg   --- 
9/14  1.3  (15 mg)*  ---  *patient mistakenly given 7.5 mg dose X 2, [facility] entered 9/15  1.4  Hold   --- 
9/16  1.6  5 mg   --- 
9/17  1.6  5 mg   --- 
9/18  1.7  5 mg   --- 
9/19  1.8  7.5 mg   --- 
9/20  2.0  6 mg   --- 
9/21  2.1  6 mg   8.1 
9/22  2  6 mg   --- Pharmacy has been consulted to dose warfarin for this patient due to a history of AVR (mechanical). The INR goal per the patient's home warfarin clinic is 2-3. Patient initially presented with a supratherapeutic INR that has trended down. INR 2. Patient has had higher warfarin requirements compared to above home dosing. Continue warfarin 6 mg tonight. Daily INR. Pharmacy will continue to follow. Please call with any questions. Thank you, Michelle Dickson, PharmD Clinical Pharmacist 
[phone]

## 2018-09-23 VITALS
BODY MASS INDEX: 39.23 KG/M2 | SYSTOLIC BLOOD PRESSURE: 139 MMHG | HEART RATE: 70 BPM | WEIGHT: 214.5 LBS | OXYGEN SATURATION: 93 % | TEMPERATURE: 98.9 F | RESPIRATION RATE: 17 BRPM | DIASTOLIC BLOOD PRESSURE: 81 MMHG

## 2018-09-23 LAB
ABO + RH BLD: NORMAL
BLD PROD TYP BPU: NORMAL
BLOOD GROUP ANTIBODIES SERPL: NORMAL
BPU ID: NORMAL
CROSSMATCH RESULT,%XM: NORMAL
ERYTHROCYTE [DISTWIDTH] IN BLOOD BY AUTOMATED COUNT: 15.7 %
HCT VFR BLD AUTO: 28.5 % (ref 35.8–46.3)
HGB BLD-MCNC: 9 G/DL (ref 11.7–15.4)
INR PPP: 2.1
MCH RBC QN AUTO: 29.5 PG (ref 26.1–32.9)
MCHC RBC AUTO-ENTMCNC: 31.6 G/DL (ref 31.4–35)
MCV RBC AUTO: 93.4 FL (ref 79.6–97.8)
NRBC # BLD: 0 K/UL (ref 0–0.2)
PLATELET # BLD AUTO: 203 K/UL (ref 150–450)
PMV BLD AUTO: 10.7 FL (ref 9.4–12.3)
PROTHROMBIN TIME: 22.5 SEC (ref 11.5–14.5)
RBC # BLD AUTO: 3.05 M/UL (ref 4.05–5.2)
SPECIMEN EXP DATE BLD: NORMAL
STATUS OF UNIT,%ST: NORMAL
UNIT DIVISION, %UDIV: 0
WBC # BLD AUTO: 5.7 K/UL (ref 4.3–11.1)

## 2018-09-23 PROCEDURE — 94640 AIRWAY INHALATION TREATMENT: CPT

## 2018-09-23 PROCEDURE — 85027 COMPLETE CBC AUTOMATED: CPT

## 2018-09-23 PROCEDURE — 94760 N-INVAS EAR/PLS OXIMETRY 1: CPT

## 2018-09-23 PROCEDURE — 36415 COLL VENOUS BLD VENIPUNCTURE: CPT

## 2018-09-23 PROCEDURE — 85610 PROTHROMBIN TIME: CPT

## 2018-09-23 PROCEDURE — 74011250637 HC RX REV CODE- 250/637: Performed by: PHYSICAL MEDICINE & REHABILITATION

## 2018-09-23 PROCEDURE — 74011000250 HC RX REV CODE- 250: Performed by: PHYSICAL MEDICINE & REHABILITATION

## 2018-09-23 RX ADMIN — OXYCODONE HYDROCHLORIDE AND ACETAMINOPHEN 500 MG: 500 TABLET ORAL at 07:57

## 2018-09-23 RX ADMIN — FUROSEMIDE 40 MG: 40 TABLET ORAL at 07:57

## 2018-09-23 RX ADMIN — ISOSORBIDE MONONITRATE 30 MG: 30 TABLET, EXTENDED RELEASE ORAL at 07:56

## 2018-09-23 RX ADMIN — ACETAMINOPHEN 650 MG: 325 TABLET ORAL at 07:57

## 2018-09-23 RX ADMIN — TIOTROPIUM BROMIDE 18 MCG: 18 CAPSULE ORAL; RESPIRATORY (INHALATION) at 06:06

## 2018-09-23 RX ADMIN — ALBUTEROL SULFATE 2.5 MG: 2.5 SOLUTION RESPIRATORY (INHALATION) at 06:06

## 2018-09-23 RX ADMIN — CARVEDILOL 6.25 MG: 6.25 TABLET, FILM COATED ORAL at 07:57

## 2018-09-23 RX ADMIN — OXYCODONE HYDROCHLORIDE 10 MG: 5 TABLET ORAL at 02:16

## 2018-09-23 RX ADMIN — PANTOPRAZOLE SODIUM 40 MG: 40 TABLET, DELAYED RELEASE ORAL at 06:08

## 2018-09-23 RX ADMIN — BUDESONIDE 500 MCG: 0.5 INHALANT RESPIRATORY (INHALATION) at 06:06

## 2018-09-23 RX ADMIN — ESCITALOPRAM OXALATE 20 MG: 10 TABLET ORAL at 07:58

## 2018-09-23 RX ADMIN — OXYCODONE HYDROCHLORIDE 10 MG: 5 TABLET ORAL at 07:57

## 2018-09-23 RX ADMIN — ASPIRIN 81 MG: 81 TABLET, COATED ORAL at 07:57

## 2018-09-23 NOTE — PROGRESS NOTES
Warfarin dosing per pharmacist 
 
Navarro Womack Sarah Stewart is a 77 y.o. female. Indication:  S/p AVR Goal INR:  2-3 Home dose:  5 mg Sun and 2.5 mg all other days  (Weekly dose of 20 mg) Risk factors or significant drug interactions:  none Other anticoagulants:  -- 
 
Daily Monitoring Date  INR     Warfarin dose  HGB              Notes 9/8  3.4  Hold   9.3 
9/9  2.8  Hold   9.0  
9/10  2.0  Hold   9.6 9/11  1.5  5 mg    9.5 9/12  1.3  5 mg   10.5 9/13  1.3  5 mg   --- 
9/14  1.3  (15 mg)*  ---  *patient mistakenly given 7.5 mg dose X 2, Jose Ramon LIZBETwilver entered 9/15  1.4  Hold   --- 
9/16  1.6  5 mg   --- 
9/17  1.6  5 mg   --- 
9/18  1.7  5 mg   --- 
9/19  1.8  7.5 mg   --- 
9/20  2.0  6 mg   --- 
9/21  2.1  6 mg   8.1 
9/22  2  6 mg   --- 
9/23  2.1  6 mg   9 Pharmacy has been consulted to dose warfarin for this patient due to a history of AVR (mechanical). The INR goal per the patient's home warfarin clinic is 2-3. Patient initially presented with a supratherapeutic INR that has trended down. INR 2.1. Patient has had higher warfarin requirements compared to above home dosing. Continue warfarin 6 mg tonight. If INR continues to stay stable may could switch to 3 x weekly INR checks. Pharmacy will continue to follow. Please call with any questions. Thank you, Claudia Valente, PharmD Clinical Pharmacist 
[phone]

## 2018-09-23 NOTE — DISCHARGE INSTRUCTIONS
DISCHARGE SUMMARY from Nurse    PATIENT INSTRUCTIONS:    After general anesthesia or intravenous sedation, for 24 hours or while taking prescription Narcotics:  · Limit your activities  · Do not drive and operate hazardous machinery  · Do not make important personal or business decisions  · Do  not drink alcoholic beverages  · If you have not urinated within 8 hours after discharge, please contact your surgeon on call. Report the following to your surgeon:  · Excessive pain, swelling, redness or odor of or around the surgical area  · Temperature over 100.5  · Nausea and vomiting lasting longer than 4 hours or if unable to take medications  · Any signs of decreased circulation or nerve impairment to extremity: change in color, persistent  numbness, tingling, coldness or increase pain  · Any questions    What to do at Home:  Recommended activity: {discharge activity:26137}, ***    If you experience any of the following symptoms ***, please follow up with ***. *  Please give a list of your current medications to your Primary Care Provider. *  Please update this list whenever your medications are discontinued, doses are      changed, or new medications (including over-the-counter products) are added. *  Please carry medication information at all times in case of emergency situations. These are general instructions for a healthy lifestyle:    No smoking/ No tobacco products/ Avoid exposure to second hand smoke  Surgeon General's Warning:  Quitting smoking now greatly reduces serious risk to your health.     Obesity, smoking, and sedentary lifestyle greatly increases your risk for illness    A healthy diet, regular physical exercise & weight monitoring are important for maintaining a healthy lifestyle    You may be retaining fluid if you have a history of heart failure or if you experience any of the following symptoms:  Weight gain of 3 pounds or more overnight or 5 pounds in a week, increased swelling in our hands or feet or shortness of breath while lying flat in bed. Please call your doctor as soon as you notice any of these symptoms; do not wait until your next office visit. Recognize signs and symptoms of STROKE:    F-face looks uneven    A-arms unable to move or move unevenly    S-speech slurred or non-existent    T-time-call 911 as soon as signs and symptoms begin-DO NOT go       Back to bed or wait to see if you get better-TIME IS BRAIN. Warning Signs of HEART ATTACK     Call 911 if you have these symptoms:   Chest discomfort. Most heart attacks involve discomfort in the center of the chest that lasts more than a few minutes, or that goes away and comes back. It can feel like uncomfortable pressure, squeezing, fullness, or pain.  Discomfort in other areas of the upper body. Symptoms can include pain or discomfort in one or both arms, the back, neck, jaw, or stomach.  Shortness of breath with or without chest discomfort.  Other signs may include breaking out in a cold sweat, nausea, or lightheadedness. Don't wait more than five minutes to call 911 - MINUTES MATTER! Fast action can save your life. Calling 911 is almost always the fastest way to get lifesaving treatment. Emergency Medical Services staff can begin treatment when they arrive -- up to an hour sooner than if someone gets to the hospital by car. The discharge information has been reviewed with the {PATIENT PARENT GUARDIAN:03077}. The {PATIENT PARENT GUARDIAN:83057} verbalized understanding. Discharge medications reviewed with the {Dishcarge meds reviewed [facility]:44153} and appropriate educational materials and side effects teaching were provided.   ___________________________________________________________________________________________________________________________________

## 2018-09-23 NOTE — PROGRESS NOTES
Problem: Falls - Risk of 
Goal: *Absence of Falls Document Terrell Levels Fall Risk and appropriate interventions in the flowsheet. Outcome: Resolved/Met Date Met: 09/23/18 Fall Risk Interventions: 
Mobility Interventions: Utilize gait belt for transfers/ambulation Medication Interventions: Teach patient to arise slowly Elimination Interventions: Toilet paper/wipes in reach History of Falls Interventions: Evaluate medications/consider consulting pharmacy

## 2018-09-23 NOTE — PROGRESS NOTES
Ron Salas MD, Medical Director Marecl Schwartz [address], [address] [facility] Tel: [phone] SFD PROGRESS NOTE [address] Admit Date: 9/11/2018 Admit Diagnosis: debility Physical debility Subjective Doing well. PT says she can have \"green socks\" . Walked 400ft with one rest break only. No cp, less PRUETT , no sob at rest. 
 
Reports improved symptoms this am. Transfused 1 unit PRBC yesterday. Ocaasional dizziness without provoking factors. Denies palpations, SOB at rest or nausea. Feels ready to be discharged home today. Objective:  
 
Current Facility-Administered Medications Medication Dose Route Frequency  0.9% sodium chloride infusion 250 mL  250 mL IntraVENous PRN  
 warfarin (COUMADIN) tablet 6 mg  6 mg Oral QPM  
 furosemide (LASIX) tablet 40 mg  40 mg Oral DAILY  traZODone (DESYREL) tablet 25-50 mg  25-50 mg Oral QHS  
 0.9% sodium chloride infusion 250 mL  250 mL IntraVENous PRN  
 acetaminophen (TYLENOL) tablet 650 mg  650 mg Oral Q6H PRN  
 sodium chloride (NS) flush 5-10 mL  5-10 mL IntraVENous PRN  
 albuterol (PROVENTIL VENTOLIN) nebulizer solution 2.5 mg  2.5 mg Nebulization Q4H PRN  
 alum-mag hydroxide-simeth (MYLANTA) oral suspension 30 mL  30 mL Oral Q4H PRN  
 ascorbic acid (vitamin C) (VITAMIN C) tablet 500 mg  500 mg Oral DAILY  aspirin delayed-release tablet 81 mg  81 mg Oral DAILY  bisacodyl (DULCOLAX) suppository 10 mg  10 mg Rectal DAILY PRN  
 budesonide (PULMICORT) 500 mcg/2 ml nebulizer suspension  500 mcg Nebulization BID RT And  
 albuterol (PROVENTIL VENTOLIN) nebulizer solution 2.5 mg  2.5 mg Nebulization Q6HWA RT  
 carvedilol (COREG) tablet 6.25 mg  6.25 mg Oral BID WITH MEALS  escitalopram oxalate (LEXAPRO) tablet 20 mg  20 mg Oral DAILY  influenza vaccine 2018-19 (6 mos+)(PF) (FLUARIX QUAD/FLULAVAL QUAD) injection 0.5 mL  0.5 mL IntraMUSCular PRIOR TO DISCHARGE  
  isosorbide mononitrate ER (IMDUR) tablet 30 mg  30 mg Oral DAILY  ondansetron (ZOFRAN ODT) tablet 4 mg  4 mg Oral Q6H PRN  
 oxyCODONE IR (ROXICODONE) tablet 5-10 mg  5-10 mg Oral Q4H PRN  pantoprazole (PROTONIX) tablet 40 mg  40 mg Oral ACB  polyethylene glycol (MIRALAX) packet 17 g  17 g Oral DAILY PRN  
 simvastatin (ZOCOR) tablet 20 mg  20 mg Oral QHS  tiotropium (SPIRIVA) inhalation capsule 18 mcg  18 mcg Inhalation DAILY  traMADol (ULTRAM) tablet 50 mg  50 mg Oral Q4H PRN Review of Systems:Denies chest pain, shortness of breath, cough, headache, visual problems, abdominal pain, dysurea, calf pain. Pertinent positives are as noted in the medical records and unremarkable otherwise. Visit Vitals  /81 (BP 1 Location: Left arm)  Pulse 70  Temp 98.9 °F (37.2 °C)  Resp 17  Wt 97.3 kg (214 lb 8 oz)  SpO2 93%  BMI 39.23 kg/m2 Physical Exam:  
General: Alert and age appropriately oriented. No acute cardio respiratory distress. HEENT: Normocephalic,no scleral icterus; poor visual acuity;  
Oral mucosa moist without cyanosis Lungs: Clear to auscultation  bilaterally. Respiration even and unlabored Heart: Regular rate and rhythm, S1, S2 No  murmurs, clicks, rub or gallops Abdomen: Soft, non-tender, nondistended. Bowel sounds present. obese Neuromuscular:  
 
 Grossly no focal motor deficits noted. Moves ankles. Skin/extremity: No rashes, no erythema. No calf tenderness BLE Wound LLE still with serosang drainage. No erythema or foul odor No edema Functional Assessment: 
Gross Assessment AROM: Generally decreased, functional (09/18/18 1600) Strength: Generally decreased, functional (09/18/18 1600) Balance Sitting - Static: Good (unsupported) (09/20/18 1600) Sitting - Dynamic: Good (unsupported) (09/20/18 1600) Standing - Static: Good (09/20/18 1600) Standing - Dynamic : Impaired (09/20/18 1200) Eddie Pereira Fall Risk Assessment: 
[place] Risk Mobility: Ambulates or transfers with assist devices or assistance (09/22/18 1058) Mobility Interventions: Utilize gait belt for transfers/ambulation (09/21/18 2327) Mentation: Alert, oriented x 3 (09/22/18 1058) Medication: Patient receiving anticonvulsants, sedatives(tranquilizers), psychotropics or hypnotics, hypoglycemics, narcotics, sleep aids, antihypertensives, laxatives, or diuretics (09/22/18 1058) Medication Interventions: Teach patient to arise slowly (09/21/18 2327) Elimination: Needs assistance with toileting (09/22/18 1058) Elimination Interventions: Toilet paper/wipes in reach (09/21/18 2327) Prior Fall History: Before admission in past 12 months _home or previous inpatient care) (09/22/18 1058) History of Falls Interventions: Evaluate medications/consider consulting pharmacy (09/21/18 2327) Total Score: 4 (09/22/18 1058) Standard Fall Precautions: Yes (09/18/18 2042) High Fall Risk: Yes (09/22/18 1058) Speech Assessment: 
    
 
Ambulation: 
Gait Distance (ft): 200 Feet (ft) (09/20/18 1600) Assistive Device: Walker, rollator (09/20/18 1600) Rail Use: Left  (09/20/18 1600) Labs/Studies: 
Recent Results (from the past 72 hour(s)) GLUCOSE, POC Collection Time: 09/20/18  3:43 PM  
Result Value Ref Range Glucose (POC) 147 (H) 65 - 100 mg/dL PROTHROMBIN TIME + INR Collection Time: 09/21/18  5:39 AM  
Result Value Ref Range Prothrombin time 22.4 (H) 11.5 - 14.5 sec INR 2.1    
CBC WITH AUTOMATED DIFF Collection Time: 09/21/18  5:39 AM  
Result Value Ref Range WBC 5.7 4.3 - 11.1 K/uL  
 RBC 2.77 (L) 4.05 - 5.2 M/uL HGB 8.1 (L) 11.7 - 15.4 g/dL HCT 26.3 (L) 35.8 - 46.3 % MCV 94.9 79.6 - 97.8 FL  
 MCH 29.2 26.1 - 32.9 PG  
 MCHC 30.8 (L) 31.4 - 35.0 g/dL  
 RDW 14.5 % PLATELET 752 093 - 917 K/uL  MPV 10.4 9.4 - 12.3 FL  
 ABSOLUTE NRBC 0.00 0.0 - 0.2 K/uL  
 DF AUTOMATED NEUTROPHILS 63 43 - 78 % LYMPHOCYTES 25 13 - 44 % MONOCYTES 9 4.0 - 12.0 % EOSINOPHILS 2 0.5 - 7.8 % BASOPHILS 0 0.0 - 2.0 % IMMATURE GRANULOCYTES 0 0.0 - 5.0 %  
 ABS. NEUTROPHILS 3.6 1.7 - 8.2 K/UL  
 ABS. LYMPHOCYTES 1.4 0.5 - 4.6 K/UL  
 ABS. MONOCYTES 0.5 0.1 - 1.3 K/UL  
 ABS. EOSINOPHILS 0.1 0.0 - 0.8 K/UL  
 ABS. BASOPHILS 0.0 0.0 - 0.2 K/UL  
 ABS. IMM. GRANS. 0.0 0.0 - 0.5 K/UL METABOLIC PANEL, BASIC Collection Time: 09/21/18  5:39 AM  
Result Value Ref Range Sodium 141 136 - 145 mmol/L Potassium 4.1 3.5 - 5.1 mmol/L Chloride 102 98 - 107 mmol/L  
 CO2 35 (H) 21 - 32 mmol/L Anion gap 4 (L) 7 - 16 mmol/L Glucose 86 65 - 100 mg/dL BUN 44 (H) 8 - 23 MG/DL Creatinine 1.67 (H) 0.6 - 1.0 MG/DL  
 GFR est AA 39 (L) >60 ml/min/1.73m2 GFR est non-AA 32 (L) >60 ml/min/1.73m2 Calcium 9.1 8.3 - 10.4 MG/DL  
GLUCOSE, POC Collection Time: 09/21/18  7:39 AM  
Result Value Ref Range Glucose (POC) 102 (H) 65 - 100 mg/dL IRON Collection Time: 09/21/18  2:52 PM  
Result Value Ref Range Iron 65 35 - 150 ug/dL FERRITIN Collection Time: 09/21/18  2:52 PM  
Result Value Ref Range Ferritin 77 8 - 388 NG/ML  
TRANSFERRIN SATURATION Collection Time: 09/21/18  2:52 PM  
Result Value Ref Range Iron 64 35 - 150 ug/dL TIBC 274 250 - 450 ug/dL Transferrin Saturation 23 >20 % PROTHROMBIN TIME + INR Collection Time: 09/22/18  6:47 AM  
Result Value Ref Range Prothrombin time 21.9 (H) 11.5 - 14.5 sec INR 2.0    
CBC W/O DIFF Collection Time: 09/22/18 11:49 AM  
Result Value Ref Range WBC 5.8 4.3 - 11.1 K/uL  
 RBC 3.03 (L) 4.05 - 5.2 M/uL HGB 8.7 (L) 11.7 - 15.4 g/dL HCT 29.1 (L) 35.8 - 46.3 % MCV 96.0 79.6 - 97.8 FL  
 MCH 28.7 26.1 - 32.9 PG  
 MCHC 29.9 (L) 31.4 - 35.0 g/dL  
 RDW 14.6 % PLATELET 012 592 - 404 K/uL  MPV 10.9 9.4 - 12.3 FL  
 ABSOLUTE NRBC 0.00 0.0 - 0.2 K/uL  
TYPE + CROSSMATCH Collection Time: 09/22/18  3:53 PM  
Result Value Ref Range Crossmatch Expiration 09/25/2018 ABO/Rh(D) A POSITIVE Antibody screen NEG Unit number [id number] Blood component type RC LR IR Unit division 00 Status of unit TRANSFUSED Crossmatch result Compatible PROTHROMBIN TIME + INR Collection Time: 09/23/18  6:10 AM  
Result Value Ref Range Prothrombin time 22.5 (H) 11.5 - 14.5 sec INR 2.1    
CBC W/O DIFF Collection Time: 09/23/18  6:10 AM  
Result Value Ref Range WBC 5.7 4.3 - 11.1 K/uL  
 RBC 3.05 (L) 4.05 - 5.2 M/uL HGB 9.0 (L) 11.7 - 15.4 g/dL HCT 28.5 (L) 35.8 - 46.3 % MCV 93.4 79.6 - 97.8 FL  
 MCH 29.5 26.1 - 32.9 PG  
 MCHC 31.6 31.4 - 35.0 g/dL  
 RDW 15.7 % PLATELET 282 983 - 457 K/uL MPV 10.7 9.4 - 12.3 FL ABSOLUTE NRBC 0.00 0.0 - 0.2 K/uL Assessment:  
 
Problem List as of 9/23/2018  Date Reviewed: 8/31/2018 Codes Class Noted - Resolved Coagulopathy ([facility] INR >4 on admission 9/7/18 ICD-10-CM: D68.9 ICD-9-CM: 286.9  9/8/2018 - Present Traumatic hematoma of left knee ICD-10-CM: M84.35ED ICD-9-CM: 924.11  9/8/2018 - Present Debility ICD-10-CM: R53.81 ICD-9-CM: 799.3  9/8/2018 - Present Anemia ICD-10-CM: D64.9 ICD-9-CM: 285.9  9/7/2018 - Present Chronic respiratory failure with hypoxia (HCC) (Chronic) ICD-10-CM: J96.11 
ICD-9-CM: 518.83, 799.02  9/7/2018 - Present Acute kidney injury superimposed on chronic kidney disease ([facility] ICD-10-CM: N17.9, N18.9 ICD-9-CM: 866.00, 585.9  9/7/2018 - Present Encounter for immunization ICD-10-CM: M30 ICD-9-CM: V03.89  8/21/2018 - Present Obesity, morbid ([facility] ICD-10-CM: E66.01 
ICD-9-CM: 278.01  6/8/2018 - Present * (Principal)Physical debility ICD-10-CM: R53.81 ICD-9-CM: 799.3  5/2/2018 - Present Closed nondisplaced fracture of shaft of fifth metacarpal bone of left hand ICD-10-CM: N75.309A ICD-9-CM: 815.03  4/28/2018 - Present Subdural hematoma (HCC) ICD-10-CM: I62.00 ICD-9-CM: 432.1  4/27/2018 - Present Long term (current) use of anticoagulants (Chronic) ICD-10-CM: Z79.01 
ICD-9-CM: V58.61  4/19/2018 - Present Dysthymia ICD-10-CM: F34.1 ICD-9-CM: 300.4  4/5/2018 - Present Type 2 diabetes mellitus with nephropathy (HCC) (Chronic) ICD-10-CM: E11.21 
ICD-9-CM: 250.40, 583.81  12/18/2017 - Present Pulmonary hypertension (HCC) (Chronic) ICD-10-CM: I27.20 ICD-9-CM: 416.8  6/15/2016 - Present S/P AVR (aortic valve replacement) (Chronic) ICD-10-CM: Z95.2 ICD-9-CM: V43.3  Unknown - Present Overview Signed 2/23/2016 11:04 AM by Keila Richey Mechanical/Artific Cardiomyopathy ([address] (Chronic) ICD-10-CM: I42.9 ICD-9-CM: 425.4  Unknown - Present Osteopenia ICD-10-CM: M85.80 ICD-9-CM: 733.90  Unknown - Present HLD (hyperlipidemia) (Chronic) ICD-10-CM: K57.2 ICD-9-CM: 272.4  Unknown - Present Osteoarthritis ICD-10-CM: M19.90 ICD-9-CM: 715.90  Unknown - Present  
   
 ICD (implantable cardioverter-defibrillator) in place ICD-10-CM: Z95.810 ICD-9-CM: V45.02  10/2/2014 - Present Overview Signed 10/2/2014  4:58 PM by Sourav Gore III [employer] single-chamber ICD implantation 10/20/14 Diabetes mellitus type 2, diet-controlled (HCC) (Chronic) ICD-10-CM: E11.9 ICD-9-CM: 250.00  8/28/2014 - Present COPD (chronic obstructive pulmonary disease) (HCC) (Chronic) ICD-10-CM: J44.9 ICD-9-CM: 003  4/2/2014 - Present REJI (obstructive sleep apnea)-cpap (Chronic) ICD-10-CM: G47.33 
ICD-9-CM: 327.23  4/2/2014 - Present CKD (chronic kidney disease) stage 3, GFR 30-59 ml/min (Chronic) ICD-10-CM: N18.3 ICD-9-CM: 585.3  7/10/2013 - Present Anticoagulated on Coumadin (Chronic) ICD-10-CM: Z51.81, Z79.01 
ICD-9-CM: V58.83, V58.61  7/9/2013 - Present Overview Signed 7/9/2013  4:16 PM by Esteban Olmos NP  
  S/P AVR 
  
  
   
 CAD (coronary artery disease) (Chronic) ICD-10-CM: I25.10 ICD-9-CM: 414.00  1/20/2013 - Present Overview Signed 5/20/2014 10:05 AM by Carrington Oliva NP  
  5/8/14 PCI LAD with stent placed Chronic combined systolic and diastolic heart failure (HCC) (Chronic) ICD-10-CM: I50.42 
ICD-9-CM: 428.42  1/20/2013 - Present Overview Addendum 4/28/2018  4:53 AM by Sidney Perez MD  
  5/8/14 ECHO:  EF 10-15% 12/2017:  EF 25-30% Essential hypertension, benign (Chronic) ICD-10-CM: I10 
ICD-9-CM: 401.1  1/20/2013 - Present MDS (myelodysplastic syndrome) (HCC) (Chronic) ICD-10-CM: D46.9 ICD-9-CM: 238.75  12/17/2011 - Present Overview Addendum 8/29/2013 11:06 AM by Ashly Hoover Procrit started in August, 2011 12/18/11 Procrit weekly and Iron stores. 5-12 12-13-12 good response to 3 weekly procrit did not need it last time 3-7-13 Pt doing well. Just wanted a \"check-up. \" Responding to Procrit every three weeks. 8-29-13 patient has missed some injections on recently restarted hemoglobin only issue , takes oral iron iron stores each time Iron deficiency anemia due to chronic blood loss (Chronic) ICD-10-CM: D50.0 ICD-9-CM: 280.0  7/29/2009 - Present RESOLVED: Routine general medical examination at a health care facility ICD-10-CM: Z00.00 ICD-9-CM: V70.0  12/16/2016 - 4/27/2018 Assessment: Profound Physical Debility s/p admitted with moderately severe anemia 
   
Continue daily physician medical management: 
Pneumonia prophylaxis- Incentive spirometer every hour while awake 
   
Chronic hypoxic respir failure; cont RT txs. On Trilogy at [facility]. Monitor O2 sats and cont O2 supplementation.  Currently on 3L per [place] 
   
 Insomnia; add Trazadone, or dtg can bring in her Melatonin 
   
DVT risk / DVT Prophylaxis- Will require daily physician exam to assess for signs and symptoms as patient is at increased risk for of thromboembolism. Mobilization as tolerated. Intermittent pneumatic compression devices when in bed Thigh-high or knee-high thromboembolic deterrent hose when out of bed. No further anticoag is needed; on therapeutic Lovenox while bridging to coumadin 
-9/12 inr down 1.3 ;pharmacy dosing; 9/13 not changed ; now on 5mg coumadin; cont lovenox until therapeutic 
-9/14 INR still subtherap at 1.3 , thus continue sq Lovenox; pharmacy dosing; 9/17 INR 1.6; pharm dosing; still on 5mg, likely need to increase; would like INR 2.5 to 3 
-9/19 INR 1.7 still subtherapeutic; on 5mg; 9/19 INR 1.8, cont coumadin and lovenox; ? ??inc coumadin to 6mg; pharm is dosing 
-9/20 2.0; received 7.5 mg yesterday. - 9/23 INR - 2.1 
   
S/p mech AVR; chronic coumadin therapy but such hi risk of bleeding due to multiple falls.   
   
CHF; cont aldactone, coreg, imdur and lasix; 9/13 pt was on Lasix 80mg bid during acute stay, have dec to 40 mg bid; states she takes 20mg at home; monitor closely for signs of chf exacerb.  
-9/14 compensated; monitor as Lasix has decreased to 40 bid, home dose 20 daily; FELIZ on CKD thus decreasing Lasix; change to 40 daily  
-9/17 no peripheral edema, no s/s of CHF, kidney fxn improving; cont 40mg for now of Lasix; bmp 9/19;improving but mild periph edema, lungs clear. Asking RN to weigh. 214 same as admission wt 
   
Pain Control: ongoing significant pain in knees which is stable and controlled by PRN meds. Will require regular pain assessment and comprenhensive pain management,  
   
Wound Care: Monitor wound status daily per staff and physician. At risk for failure. Will require 24/7 rehab nursing.  Keep wound clean and dry, Reinforce dressing PRN and Ice to area for comfort 
   
 Hypertension - BP controlled, fluctuating, managed medically.  
   
Acute on chronic anemia; possible MDS followed by Dr Cyril Luna. Monitor closely, sp transfusion. Cont epocrit. Cont ferrous sulfate and vit C; recheck  9/20 . Hgb - 8.7 > 9.0 9/23, Fe studies unremarkable. Transfused 1 U PRBC yesterday.   
   
Depression; cont Lexapro. Depression regarding medical cond and fear of losing independence 
   
Diabetes mellitus - controlled. poor glycemic control. Will require daily, close FSG monitoring and medication adjustment to optimize glycemic control in setting of acute illness and hospitalization.  
-diet controlled so far 
   
Urinary retention/ neurogenic bladder - schedule voids q6-8 hrs. Check post-void residual as needed; In and Out catheterize if post-void residual is more than 400 cc. 
-having urinary incont; will try timed voids due to urgency 
   
CKD stg 3; creat 2.05 on admission to rehab. Was 1.55 yesterday. Recheck in a.m and review meds contributing. 
-9/12 creat is increasing; dec lasix; recheck pending 9/13. 2.48 Today 9/14 creat 2.35, BUN 74; slowly improving with drop in Lasix/CHF well compensated; DAILY BMP 
-creatinine continues to improve with decreased Lasix; no s/s of chf, no peripheral edema and lungs clear; creat 1.69 from 1.96 yest. BUN down to 54 from 65 yesterday. Close to baseline? 
-recheck 9/19. Today no edema, no increased SOB to suggest fluid overload 
-9/19 weigh today. Minimal pedal edema; no sob. Creat and bun improving still 
   
bowel program - add bowel program prn 
   
GERD - add PPI. At times may need additional antacids, Maalox prn. Previous hx of GIB;HH stable. Discharge home today. Patient encouraged to [facility] [place]. Discharge orders have been completed. 
   
Time spent was 15 minutes with over 1/2 in direct patient care/examination, consultation and coordination of care. Signed By: Nate Newton MD   
 September 23, 2018

## 2018-09-24 ENCOUNTER — HOME CARE VISIT (OUTPATIENT)
Dept: SCHEDULING | Facility: HOME HEALTH | Age: 68
End: 2018-09-24
Payer: MEDICARE

## 2018-09-24 VITALS
RESPIRATION RATE: 20 BRPM | TEMPERATURE: 97.2 F | OXYGEN SATURATION: 96 % | DIASTOLIC BLOOD PRESSURE: 90 MMHG | SYSTOLIC BLOOD PRESSURE: 160 MMHG | HEART RATE: 70 BPM

## 2018-09-24 PROCEDURE — 3331090001 HH PPS REVENUE CREDIT

## 2018-09-24 PROCEDURE — A4452 WATERPROOF TAPE: HCPCS

## 2018-09-24 PROCEDURE — 3331090002 HH PPS REVENUE DEBIT

## 2018-09-24 PROCEDURE — A6402 STERILE GAUZE <= 16 SQ IN: HCPCS

## 2018-09-24 PROCEDURE — A4927 NON-STERILE GLOVES: HCPCS

## 2018-09-24 PROCEDURE — A6446 CONFORM BAND S W>=3" <5"/YD: HCPCS

## 2018-09-24 PROCEDURE — A6252 ABSORPT DRG >16 <=48 W/O BDR: HCPCS

## 2018-09-24 PROCEDURE — 400013 HH SOC

## 2018-09-24 PROCEDURE — A6223 GAUZE >16<=48 NO W/SAL W/O B: HCPCS

## 2018-09-24 PROCEDURE — G0299 HHS/HOSPICE OF RN EA 15 MIN: HCPCS

## 2018-09-24 PROCEDURE — A4649 SURGICAL SUPPLIES: HCPCS

## 2018-09-25 ENCOUNTER — HOME CARE VISIT (OUTPATIENT)
Dept: SCHEDULING | Facility: HOME HEALTH | Age: 68
End: 2018-09-25
Payer: MEDICARE

## 2018-09-25 LAB
INR BLD: 3.9 (ref 0.9–1.1)
PT POC: 47.1 SECONDS (ref 11.8–14.9)

## 2018-09-25 PROCEDURE — G0299 HHS/HOSPICE OF RN EA 15 MIN: HCPCS

## 2018-09-25 PROCEDURE — 3331090002 HH PPS REVENUE DEBIT

## 2018-09-25 PROCEDURE — 3331090001 HH PPS REVENUE CREDIT

## 2018-09-26 ENCOUNTER — HOME CARE VISIT (OUTPATIENT)
Dept: SCHEDULING | Facility: HOME HEALTH | Age: 68
End: 2018-09-26
Payer: MEDICARE

## 2018-09-26 VITALS
DIASTOLIC BLOOD PRESSURE: 84 MMHG | SYSTOLIC BLOOD PRESSURE: 122 MMHG | OXYGEN SATURATION: 96 % | TEMPERATURE: 98.4 F | RESPIRATION RATE: 19 BRPM | HEART RATE: 64 BPM

## 2018-09-26 PROCEDURE — 3331090002 HH PPS REVENUE DEBIT

## 2018-09-26 PROCEDURE — 3331090001 HH PPS REVENUE CREDIT

## 2018-09-26 PROCEDURE — G0151 HHCP-SERV OF PT,EA 15 MIN: HCPCS

## 2018-09-27 VITALS
DIASTOLIC BLOOD PRESSURE: 80 MMHG | TEMPERATURE: 97.6 F | OXYGEN SATURATION: 98 % | SYSTOLIC BLOOD PRESSURE: 120 MMHG | RESPIRATION RATE: 18 BRPM | HEART RATE: 87 BPM

## 2018-09-27 PROCEDURE — 3331090001 HH PPS REVENUE CREDIT

## 2018-09-27 PROCEDURE — 3331090002 HH PPS REVENUE DEBIT

## 2018-09-28 ENCOUNTER — HOME CARE VISIT (OUTPATIENT)
Dept: HOME HEALTH SERVICES | Facility: HOME HEALTH | Age: 68
End: 2018-09-28
Payer: MEDICARE

## 2018-09-28 PROCEDURE — 3331090001 HH PPS REVENUE CREDIT

## 2018-09-28 PROCEDURE — G0299 HHS/HOSPICE OF RN EA 15 MIN: HCPCS

## 2018-09-28 PROCEDURE — 3331090002 HH PPS REVENUE DEBIT

## 2018-09-29 PROCEDURE — 3331090001 HH PPS REVENUE CREDIT

## 2018-09-29 PROCEDURE — 3331090002 HH PPS REVENUE DEBIT

## 2018-09-30 PROCEDURE — 3331090002 HH PPS REVENUE DEBIT

## 2018-09-30 PROCEDURE — 3331090001 HH PPS REVENUE CREDIT

## 2018-10-01 ENCOUNTER — HOME CARE VISIT (OUTPATIENT)
Dept: SCHEDULING | Facility: HOME HEALTH | Age: 68
End: 2018-10-01
Payer: MEDICARE

## 2018-10-01 VITALS
HEART RATE: 80 BPM | OXYGEN SATURATION: 96 % | RESPIRATION RATE: 18 BRPM | SYSTOLIC BLOOD PRESSURE: 118 MMHG | TEMPERATURE: 98.8 F | DIASTOLIC BLOOD PRESSURE: 65 MMHG

## 2018-10-01 VITALS
SYSTOLIC BLOOD PRESSURE: 122 MMHG | HEART RATE: 87 BPM | DIASTOLIC BLOOD PRESSURE: 84 MMHG | TEMPERATURE: 98.9 F | OXYGEN SATURATION: 97 % | RESPIRATION RATE: 18 BRPM

## 2018-10-01 PROCEDURE — 3331090002 HH PPS REVENUE DEBIT

## 2018-10-01 PROCEDURE — 3331090001 HH PPS REVENUE CREDIT

## 2018-10-01 PROCEDURE — G0299 HHS/HOSPICE OF RN EA 15 MIN: HCPCS

## 2018-10-01 PROCEDURE — G0152 HHCP-SERV OF OT,EA 15 MIN: HCPCS

## 2018-10-02 ENCOUNTER — HOME CARE VISIT (OUTPATIENT)
Dept: SCHEDULING | Facility: HOME HEALTH | Age: 68
End: 2018-10-02
Payer: MEDICARE

## 2018-10-02 VITALS
DIASTOLIC BLOOD PRESSURE: 82 MMHG | HEART RATE: 94 BPM | RESPIRATION RATE: 19 BRPM | OXYGEN SATURATION: 93 % | TEMPERATURE: 97.4 F | SYSTOLIC BLOOD PRESSURE: 140 MMHG

## 2018-10-02 PROCEDURE — 3331090002 HH PPS REVENUE DEBIT

## 2018-10-02 PROCEDURE — 3331090001 HH PPS REVENUE CREDIT

## 2018-10-02 PROCEDURE — G0157 HHC PT ASSISTANT EA 15: HCPCS

## 2018-10-03 PROBLEM — N17.9 ACUTE KIDNEY INJURY SUPERIMPOSED ON CHRONIC KIDNEY DISEASE (HCC): Status: RESOLVED | Noted: 2018-09-07 | Resolved: 2018-10-03

## 2018-10-03 PROBLEM — N18.9 ACUTE KIDNEY INJURY SUPERIMPOSED ON CHRONIC KIDNEY DISEASE (HCC): Status: RESOLVED | Noted: 2018-09-07 | Resolved: 2018-10-03

## 2018-10-03 PROBLEM — D68.9 COAGULOPATHY (HCC): Status: RESOLVED | Noted: 2018-09-08 | Resolved: 2018-10-03

## 2018-10-03 PROCEDURE — 3331090002 HH PPS REVENUE DEBIT

## 2018-10-03 PROCEDURE — 3331090001 HH PPS REVENUE CREDIT

## 2018-10-04 ENCOUNTER — HOME CARE VISIT (OUTPATIENT)
Dept: SCHEDULING | Facility: HOME HEALTH | Age: 68
End: 2018-10-04
Payer: MEDICARE

## 2018-10-04 VITALS
SYSTOLIC BLOOD PRESSURE: 120 MMHG | DIASTOLIC BLOOD PRESSURE: 62 MMHG | HEART RATE: 79 BPM | OXYGEN SATURATION: 98 % | TEMPERATURE: 97.9 F | RESPIRATION RATE: 18 BRPM

## 2018-10-04 VITALS
RESPIRATION RATE: 19 BRPM | HEART RATE: 90 BPM | TEMPERATURE: 97.1 F | SYSTOLIC BLOOD PRESSURE: 138 MMHG | DIASTOLIC BLOOD PRESSURE: 82 MMHG | OXYGEN SATURATION: 95 %

## 2018-10-04 PROCEDURE — 3331090001 HH PPS REVENUE CREDIT

## 2018-10-04 PROCEDURE — G0157 HHC PT ASSISTANT EA 15: HCPCS

## 2018-10-04 PROCEDURE — 3331090002 HH PPS REVENUE DEBIT

## 2018-10-05 ENCOUNTER — HOME CARE VISIT (OUTPATIENT)
Dept: SCHEDULING | Facility: HOME HEALTH | Age: 68
End: 2018-10-05
Payer: MEDICARE

## 2018-10-05 PROCEDURE — G0299 HHS/HOSPICE OF RN EA 15 MIN: HCPCS

## 2018-10-05 PROCEDURE — 3331090001 HH PPS REVENUE CREDIT

## 2018-10-05 PROCEDURE — G0158 HHC OT ASSISTANT EA 15: HCPCS

## 2018-10-05 PROCEDURE — 3331090002 HH PPS REVENUE DEBIT

## 2018-10-06 ENCOUNTER — HOSPITAL ENCOUNTER (INPATIENT)
Age: 68
LOS: 4 days | Discharge: HOME HEALTH | DRG: 571 | End: 2018-10-10
Attending: EMERGENCY MEDICINE | Admitting: INTERNAL MEDICINE
Payer: MEDICARE

## 2018-10-06 ENCOUNTER — APPOINTMENT (OUTPATIENT)
Dept: GENERAL RADIOLOGY | Age: 68
DRG: 571 | End: 2018-10-06
Payer: MEDICARE

## 2018-10-06 VITALS
RESPIRATION RATE: 17 BRPM | SYSTOLIC BLOOD PRESSURE: 158 MMHG | HEART RATE: 70 BPM | OXYGEN SATURATION: 98 % | TEMPERATURE: 97.2 F | DIASTOLIC BLOOD PRESSURE: 88 MMHG

## 2018-10-06 DIAGNOSIS — E11.622 DIABETIC ULCER OF KNEE (HCC): Primary | ICD-10-CM

## 2018-10-06 DIAGNOSIS — D72.829 LEUKOCYTOSIS, UNSPECIFIED TYPE: ICD-10-CM

## 2018-10-06 DIAGNOSIS — I27.20 PULMONARY HYPERTENSION (HCC): Chronic | ICD-10-CM

## 2018-10-06 DIAGNOSIS — E11.21 TYPE 2 DIABETES MELLITUS WITH NEPHROPATHY (HCC): Chronic | ICD-10-CM

## 2018-10-06 DIAGNOSIS — L97.909 DIABETIC ULCER OF KNEE (HCC): Primary | ICD-10-CM

## 2018-10-06 PROBLEM — R53.81 PHYSICAL DEBILITY: Chronic | Status: ACTIVE | Noted: 2018-05-02

## 2018-10-06 PROBLEM — D64.9 ANEMIA: Chronic | Status: ACTIVE | Noted: 2018-09-07

## 2018-10-06 PROBLEM — E66.01 OBESITY, MORBID (HCC): Chronic | Status: ACTIVE | Noted: 2018-06-08

## 2018-10-06 PROBLEM — S80.02XA TRAUMATIC HEMATOMA OF LEFT KNEE: Chronic | Status: ACTIVE | Noted: 2018-09-08

## 2018-10-06 PROBLEM — Z98.890 HISTORY OF INCISION AND DRAINAGE: Status: ACTIVE | Noted: 2018-10-06

## 2018-10-06 PROBLEM — L02.91 ABSCESS: Status: ACTIVE | Noted: 2018-10-06

## 2018-10-06 LAB
ALBUMIN SERPL-MCNC: 3.5 G/DL (ref 3.2–4.6)
ALBUMIN/GLOB SERPL: 0.8 {RATIO} (ref 1.2–3.5)
ALP SERPL-CCNC: 63 U/L (ref 50–136)
ALT SERPL-CCNC: 13 U/L (ref 12–65)
ANION GAP SERPL CALC-SCNC: 3 MMOL/L (ref 7–16)
APTT PPP: 55.2 SEC (ref 23.2–35.3)
AST SERPL-CCNC: 13 U/L (ref 15–37)
BASOPHILS # BLD: 0 K/UL (ref 0–0.2)
BASOPHILS NFR BLD: 0 % (ref 0–2)
BILIRUB SERPL-MCNC: 0.2 MG/DL (ref 0.2–1.1)
BUN SERPL-MCNC: 38 MG/DL (ref 8–23)
CALCIUM SERPL-MCNC: 10 MG/DL (ref 8.3–10.4)
CHLORIDE SERPL-SCNC: 97 MMOL/L (ref 98–107)
CO2 SERPL-SCNC: 39 MMOL/L (ref 21–32)
CREAT SERPL-MCNC: 1.48 MG/DL (ref 0.6–1)
DIFFERENTIAL METHOD BLD: ABNORMAL
EOSINOPHIL # BLD: 0.1 K/UL (ref 0–0.8)
EOSINOPHIL NFR BLD: 1 % (ref 0.5–7.8)
ERYTHROCYTE [DISTWIDTH] IN BLOOD BY AUTOMATED COUNT: 14.5 %
GLOBULIN SER CALC-MCNC: 4.6 G/DL (ref 2.3–3.5)
GLUCOSE BLD STRIP.AUTO-MCNC: 270 MG/DL (ref 65–100)
GLUCOSE SERPL-MCNC: 119 MG/DL (ref 65–100)
HCT VFR BLD AUTO: 35.5 % (ref 35.8–46.3)
HGB BLD-MCNC: 10.9 G/DL (ref 11.7–15.4)
IMM GRANULOCYTES # BLD: 0 K/UL (ref 0–0.5)
IMM GRANULOCYTES NFR BLD AUTO: 0 % (ref 0–5)
INR PPP: 2.6
LACTATE BLD-SCNC: 1.8 MMOL/L (ref 0.5–1.9)
LIPASE SERPL-CCNC: 620 U/L (ref 73–393)
LYMPHOCYTES # BLD: 2.7 K/UL (ref 0.5–4.6)
LYMPHOCYTES NFR BLD: 23 % (ref 13–44)
MCH RBC QN AUTO: 29.1 PG (ref 26.1–32.9)
MCHC RBC AUTO-ENTMCNC: 30.7 G/DL (ref 31.4–35)
MCV RBC AUTO: 94.7 FL (ref 79.6–97.8)
MONOCYTES # BLD: 1 K/UL (ref 0.1–1.3)
MONOCYTES NFR BLD: 9 % (ref 4–12)
NEUTS SEG # BLD: 7.7 K/UL (ref 1.7–8.2)
NEUTS SEG NFR BLD: 67 % (ref 43–78)
NRBC # BLD: 0 K/UL (ref 0–0.2)
PLATELET # BLD AUTO: 232 K/UL (ref 150–450)
PMV BLD AUTO: 10.1 FL (ref 9.4–12.3)
POTASSIUM SERPL-SCNC: 4.6 MMOL/L (ref 3.5–5.1)
PROT SERPL-MCNC: 8.1 G/DL (ref 6.3–8.2)
PROTHROMBIN TIME: 26.8 SEC (ref 11.5–14.5)
RBC # BLD AUTO: 3.75 M/UL (ref 4.05–5.2)
SODIUM SERPL-SCNC: 139 MMOL/L (ref 136–145)
WBC # BLD AUTO: 11.6 K/UL (ref 4.3–11.1)

## 2018-10-06 PROCEDURE — 86580 TB INTRADERMAL TEST: CPT | Performed by: NURSE PRACTITIONER

## 2018-10-06 PROCEDURE — 87205 SMEAR GRAM STAIN: CPT

## 2018-10-06 PROCEDURE — 74011000258 HC RX REV CODE- 258: Performed by: NURSE PRACTITIONER

## 2018-10-06 PROCEDURE — 65270000029 HC RM PRIVATE

## 2018-10-06 PROCEDURE — 74011000302 HC RX REV CODE- 302: Performed by: NURSE PRACTITIONER

## 2018-10-06 PROCEDURE — 94760 N-INVAS EAR/PLS OXIMETRY 1: CPT

## 2018-10-06 PROCEDURE — 82962 GLUCOSE BLOOD TEST: CPT

## 2018-10-06 PROCEDURE — 87186 SC STD MICRODIL/AGAR DIL: CPT

## 2018-10-06 PROCEDURE — 3331090002 HH PPS REVENUE DEBIT

## 2018-10-06 PROCEDURE — 94640 AIRWAY INHALATION TREATMENT: CPT

## 2018-10-06 PROCEDURE — 83605 ASSAY OF LACTIC ACID: CPT

## 2018-10-06 PROCEDURE — 74011250636 HC RX REV CODE- 250/636: Performed by: INTERNAL MEDICINE

## 2018-10-06 PROCEDURE — 74011636637 HC RX REV CODE- 636/637: Performed by: NURSE PRACTITIONER

## 2018-10-06 PROCEDURE — 87077 CULTURE AEROBIC IDENTIFY: CPT

## 2018-10-06 PROCEDURE — 99283 EMERGENCY DEPT VISIT LOW MDM: CPT | Performed by: EMERGENCY MEDICINE

## 2018-10-06 PROCEDURE — 80053 COMPREHEN METABOLIC PANEL: CPT

## 2018-10-06 PROCEDURE — 74011250636 HC RX REV CODE- 250/636: Performed by: NURSE PRACTITIONER

## 2018-10-06 PROCEDURE — 85025 COMPLETE CBC W/AUTO DIFF WBC: CPT

## 2018-10-06 PROCEDURE — 85730 THROMBOPLASTIN TIME PARTIAL: CPT

## 2018-10-06 PROCEDURE — 74011250637 HC RX REV CODE- 250/637: Performed by: NURSE PRACTITIONER

## 2018-10-06 PROCEDURE — 73560 X-RAY EXAM OF KNEE 1 OR 2: CPT

## 2018-10-06 PROCEDURE — 85610 PROTHROMBIN TIME: CPT

## 2018-10-06 PROCEDURE — 83690 ASSAY OF LIPASE: CPT

## 2018-10-06 PROCEDURE — 3331090001 HH PPS REVENUE CREDIT

## 2018-10-06 PROCEDURE — 77010033678 HC OXYGEN DAILY

## 2018-10-06 PROCEDURE — 74011000250 HC RX REV CODE- 250: Performed by: INTERNAL MEDICINE

## 2018-10-06 RX ORDER — FERROUS GLUCONATE 324(38)MG
1 TABLET ORAL 2 TIMES DAILY
Status: DISCONTINUED | OUTPATIENT
Start: 2018-10-06 | End: 2018-10-10 | Stop reason: HOSPADM

## 2018-10-06 RX ORDER — FLUTICASONE PROPIONATE 50 MCG
2 SPRAY, SUSPENSION (ML) NASAL
Status: DISCONTINUED | OUTPATIENT
Start: 2018-10-06 | End: 2018-10-10 | Stop reason: HOSPADM

## 2018-10-06 RX ORDER — WARFARIN 2.5 MG/1
2.5 TABLET ORAL
Status: ON HOLD | COMMUNITY
End: 2019-01-01 | Stop reason: SDUPTHER

## 2018-10-06 RX ORDER — TRAMADOL HYDROCHLORIDE 50 MG/1
100 TABLET ORAL
Status: DISCONTINUED | OUTPATIENT
Start: 2018-10-06 | End: 2018-10-10 | Stop reason: HOSPADM

## 2018-10-06 RX ORDER — TRAZODONE HYDROCHLORIDE 50 MG/1
25-50 TABLET ORAL
Status: DISCONTINUED | OUTPATIENT
Start: 2018-10-06 | End: 2018-10-07

## 2018-10-06 RX ORDER — ALBUTEROL SULFATE 0.83 MG/ML
2.5 SOLUTION RESPIRATORY (INHALATION)
Status: DISCONTINUED | OUTPATIENT
Start: 2018-10-06 | End: 2018-10-10 | Stop reason: HOSPADM

## 2018-10-06 RX ORDER — SODIUM CHLORIDE 0.9 % (FLUSH) 0.9 %
5-10 SYRINGE (ML) INJECTION AS NEEDED
Status: DISCONTINUED | OUTPATIENT
Start: 2018-10-06 | End: 2018-10-10 | Stop reason: HOSPADM

## 2018-10-06 RX ORDER — BUDESONIDE 0.5 MG/2ML
500 INHALANT ORAL
Status: DISCONTINUED | OUTPATIENT
Start: 2018-10-06 | End: 2018-10-10 | Stop reason: HOSPADM

## 2018-10-06 RX ORDER — ISOSORBIDE MONONITRATE 30 MG/1
30 TABLET, EXTENDED RELEASE ORAL DAILY
Status: DISCONTINUED | OUTPATIENT
Start: 2018-10-07 | End: 2018-10-10 | Stop reason: HOSPADM

## 2018-10-06 RX ORDER — MELATONIN
2000 DAILY
Status: DISCONTINUED | OUTPATIENT
Start: 2018-10-07 | End: 2018-10-10 | Stop reason: HOSPADM

## 2018-10-06 RX ORDER — TRAMADOL HYDROCHLORIDE 50 MG/1
50 TABLET ORAL
Status: DISCONTINUED | OUTPATIENT
Start: 2018-10-06 | End: 2018-10-10 | Stop reason: HOSPADM

## 2018-10-06 RX ORDER — SODIUM CHLORIDE 0.9 % (FLUSH) 0.9 %
5-10 SYRINGE (ML) INJECTION EVERY 8 HOURS
Status: DISCONTINUED | OUTPATIENT
Start: 2018-10-06 | End: 2018-10-10 | Stop reason: HOSPADM

## 2018-10-06 RX ORDER — ASPIRIN 81 MG/1
81 TABLET ORAL DAILY
Status: DISCONTINUED | OUTPATIENT
Start: 2018-10-07 | End: 2018-10-10 | Stop reason: HOSPADM

## 2018-10-06 RX ORDER — CALCIUM CARBONATE 200(500)MG
200 TABLET,CHEWABLE ORAL 2 TIMES DAILY
Status: DISCONTINUED | OUTPATIENT
Start: 2018-10-06 | End: 2018-10-10 | Stop reason: HOSPADM

## 2018-10-06 RX ORDER — ASCORBIC ACID 500 MG
500 TABLET ORAL DAILY
Status: DISCONTINUED | OUTPATIENT
Start: 2018-10-07 | End: 2018-10-10 | Stop reason: HOSPADM

## 2018-10-06 RX ORDER — LORATADINE 10 MG/1
10 TABLET ORAL
Status: DISCONTINUED | OUTPATIENT
Start: 2018-10-06 | End: 2018-10-10 | Stop reason: HOSPADM

## 2018-10-06 RX ORDER — SPIRONOLACTONE 25 MG/1
25 TABLET ORAL 2 TIMES DAILY
Status: DISCONTINUED | OUTPATIENT
Start: 2018-10-06 | End: 2018-10-10 | Stop reason: HOSPADM

## 2018-10-06 RX ORDER — INSULIN LISPRO 100 [IU]/ML
INJECTION, SOLUTION INTRAVENOUS; SUBCUTANEOUS
Status: DISCONTINUED | OUTPATIENT
Start: 2018-10-06 | End: 2018-10-10 | Stop reason: HOSPADM

## 2018-10-06 RX ORDER — VANCOMYCIN 2 GRAM/500 ML IN 0.9 % SODIUM CHLORIDE INTRAVENOUS
2000 ONCE
Status: COMPLETED | OUTPATIENT
Start: 2018-10-06 | End: 2018-10-08

## 2018-10-06 RX ORDER — ESCITALOPRAM OXALATE 10 MG/1
20 TABLET ORAL DAILY
Status: DISCONTINUED | OUTPATIENT
Start: 2018-10-07 | End: 2018-10-10 | Stop reason: HOSPADM

## 2018-10-06 RX ORDER — WARFARIN 2.5 MG/1
2.5 TABLET ORAL
Status: DISCONTINUED | OUTPATIENT
Start: 2018-10-06 | End: 2018-10-07

## 2018-10-06 RX ORDER — FUROSEMIDE 40 MG/1
40 TABLET ORAL DAILY
Status: DISCONTINUED | OUTPATIENT
Start: 2018-10-07 | End: 2018-10-10 | Stop reason: HOSPADM

## 2018-10-06 RX ORDER — SIMVASTATIN 20 MG/1
20 TABLET, FILM COATED ORAL
Status: DISCONTINUED | OUTPATIENT
Start: 2018-10-06 | End: 2018-10-10 | Stop reason: HOSPADM

## 2018-10-06 RX ORDER — CARVEDILOL 6.25 MG/1
6.25 TABLET ORAL 2 TIMES DAILY WITH MEALS
Status: DISCONTINUED | OUTPATIENT
Start: 2018-10-06 | End: 2018-10-10 | Stop reason: HOSPADM

## 2018-10-06 RX ORDER — ACETAMINOPHEN 325 MG/1
650 TABLET ORAL
Status: DISCONTINUED | OUTPATIENT
Start: 2018-10-06 | End: 2018-10-10 | Stop reason: HOSPADM

## 2018-10-06 RX ADMIN — Medication 10 ML: at 18:31

## 2018-10-06 RX ADMIN — VANCOMYCIN HYDROCHLORIDE 2000 MG: 10 INJECTION, POWDER, LYOPHILIZED, FOR SOLUTION INTRAVENOUS at 19:56

## 2018-10-06 RX ADMIN — CARVEDILOL 6.25 MG: 6.25 TABLET, FILM COATED ORAL at 18:29

## 2018-10-06 RX ADMIN — BUDESONIDE 500 MCG: 0.5 INHALANT RESPIRATORY (INHALATION) at 21:39

## 2018-10-06 RX ADMIN — SPIRONOLACTONE 25 MG: 25 TABLET ORAL at 19:55

## 2018-10-06 RX ADMIN — TRAMADOL HYDROCHLORIDE 100 MG: 50 TABLET, FILM COATED ORAL at 19:53

## 2018-10-06 RX ADMIN — SIMVASTATIN 20 MG: 20 TABLET, FILM COATED ORAL at 19:54

## 2018-10-06 RX ADMIN — TRAZODONE HYDROCHLORIDE 50 MG: 50 TABLET ORAL at 21:50

## 2018-10-06 RX ADMIN — SACUBITRIL AND VALSARTAN 1 TABLET: 24; 26 TABLET, FILM COATED ORAL at 21:50

## 2018-10-06 RX ADMIN — ALBUTEROL SULFATE 2.5 MG: 2.5 SOLUTION RESPIRATORY (INHALATION) at 21:39

## 2018-10-06 RX ADMIN — WARFARIN SODIUM 2.5 MG: 2.5 TABLET ORAL at 19:53

## 2018-10-06 RX ADMIN — FERROUS GLUCONATE 1 TABLET: 324 TABLET ORAL at 19:53

## 2018-10-06 RX ADMIN — INSULIN LISPRO 6 UNITS: 100 INJECTION, SOLUTION INTRAVENOUS; SUBCUTANEOUS at 21:48

## 2018-10-06 RX ADMIN — CEFTRIAXONE SODIUM 2 G: 2 INJECTION, POWDER, FOR SOLUTION INTRAMUSCULAR; INTRAVENOUS at 18:32

## 2018-10-06 RX ADMIN — Medication 5 ML: at 19:59

## 2018-10-06 RX ADMIN — TUBERCULIN PURIFIED PROTEIN DERIVATIVE 5 UNITS: 5 INJECTION, SOLUTION INTRADERMAL at 18:37

## 2018-10-06 RX ADMIN — FERROUS GLUCONATE 1 TABLET: 324 TABLET ORAL at 18:29

## 2018-10-06 NOTE — IP AVS SNAPSHOT
Lupillo Hassan 
 
 
 [address] [facility] [address] [facility] 
[phone] Patient: Myke Lopez MRN: [id number] [facility]:3/0/6509 About your hospitalization You were admitted on:  October 6, 2018 You last received care in the:  [facility] 2 SURGICAL You were discharged on:  October 10, 2018 Why you were hospitalized Your primary diagnosis was:  Abscess Your diagnoses also included:  Mds (Myelodysplastic Syndrome) (Hcc), Cad (Coronary Artery Disease), Chronic Combined Systolic And Diastolic Heart Failure (Hcc), Anticoagulated On Coumadin, Ckd (Chronic Kidney Disease) Stage 3, Gfr 30-59 Ml/Min (Hcc), Jamal (Obstructive Sleep Apnea), Copd (Chronic Obstructive Pulmonary Disease) ([facility]), Diabetes Mellitus Type 2, Diet-Controlled (Hcc), S/P Avr (Aortic Valve Replacement), Essential Hypertension, Benign, Cardiomyopathy (Hcc), Pulmonary Hypertension (Hcc), Chronic Respiratory Failure With Hypoxia ([facility]), Icd (Implantable Cardioverter-Defibrillator) In Place, Physical Debility, Anemia, Traumatic Hematoma Of Left Knee, Obesity, Morbid (Hcc), History Of Incision And Drainage, Open Wound Of Left Lower Extremity With Complication Follow-up Information Follow up With Details Comments Contact Info Ama Acevedo MD On 10/12/2018 2:15 [address] [place] [address] 
[phone] 
  
 wound clinic On 10/18/2018 WITH DR FRANKLIN AT 2:30 Your Scheduled Appointments Friday October 12, 2018  2:30 PM EDT Extended Office Visit with Ama Acevedo MD  
[facility] Internal Medicine ([facility] INTERNAL MEDICINE) [address] Darrel Castillo [facility] [address]  
[phone] Thursday October 18, 2018  2:30 PM EDT  
[facility] with Emilie Escobar RN  
[facility] OP WOUND CARE ([address]) Marcial Karie Goodman Denise [address] [facility] [address]  
[phone]  Wednesday October 24, 2018  3:20 PM EDT  
 (Arrive by 2:50 PM) HOSPITAL with Edis Lugo NP [facility] ([facility]) [address] [facility]  
[phone] Thursday November 01, 2018 11:30 AM EDT HOSPITAL FOLLOW-UP with Alex Mcclain MD  
[address] ([address] [facility]) [address]  
[phone] Wednesday November 14, 2018  3:15 PM EST Office Visit with Yolanda Collins MD  
[facility] Internal Medicine ([facility] INTERNAL MEDICINE) [address] Saadia Martin [place] [address] [phone] Discharge Orders None A check suzanne indicates which time of day the medication should be taken. My Medications START taking these medications Instructions Each Dose to Equal  
 Morning Noon Evening Bedtime  
 ciprofloxacin HCl 500 mg tablet Commonly known as:  CIPRO Take 1 Tab by mouth two (2) times a day. 500 mg  
    
  
   
   
   
  
  
 clindamycin 300 mg capsule Commonly known as:  CLEOCIN Take 1 Cap by mouth three (3) times daily for 14 days. 300 mg CHANGE how you take these medications Instructions Each Dose to Equal  
 Morning Noon Evening Bedtime * albuterol 2.5 mg /3 mL (0.083 %) nebulizer solution Commonly known as:  PROVENTIL VENTOLIN What changed:  Another medication with the same name was changed. Make sure you understand how and when to take each. Notes to Patient:  Take on as needed schedule 3 mL by Nebulization route every four (4) hours as needed for Wheezing. 2.5 mg  
    
   
   
   
  
 * albuterol 90 mcg/actuation inhaler Commonly known as:  VENTOLIN HFA What changed:   
- how much to take 
- how to take this - when to take this 
- reasons to take this 
- additional instructions Notes to Patient:  As needed 2 puffs qid prn carvedilol 6.25 mg tablet Commonly known as:  Jordi Christian What changed:  how much to take Take 1 Tab by mouth two (2) times daily (with meals). 6.25 mg  
    
  
   
   
  
   
  
 cholecalciferol (VITAMIN D3) 5,000 unit Tab tablet Commonly known as:  VITAMIN D3 What changed:  how much to take Take 1 Tab by mouth daily. 5000 Units  
    
  
   
   
   
  
 ferrous gluconate 324 mg (38 mg iron) tablet What changed:   
- how much to take - when to take this 
- additional instructions TAKE 1 TAB BY MOUTH DAILY (BEFORE BREAKFAST). INDICATIONS: IRON DEFICIENCY ANEMIA  
     
  
   
   
   
  
 furosemide 40 mg tablet Commonly known as:  LASIX What changed:   
- how much to take - when to take this Take 1 Tab by mouth daily. 40 mg  
    
  
   
   
   
  
 loratadine 10 mg tablet Commonly known as:  Braulio Prima What changed:  See the new instructions. TAKE 1 TAB BY MOUTH DAILY. mometasone-formoterol 200-5 mcg/actuation HFA inhaler Commonly known as:  Jori Lal What changed:   
- how much to take 
- how to take this - when to take this 
- reasons to take this 
- additional instructions 2 puffs bid, rinse mouth after use. sacubitril-valsartan 24-26 mg tablet Commonly known as:  ENTRESTO What changed:   
- when to take this 
- additional instructions Take 1 Tab by mouth two (2) times a day. INDICATIONS: CHRONIC HEART FAILURE  
 1 Tab  
    
  
   
   
   
  
  
 traMADol 50 mg tablet Commonly known as:  ULTRAM  
What changed:   
- how much to take 
- how to take this - when to take this 
- reasons to take this 
- additional instructions Notes to Patient:  Take on as needed schedule TAKE 1 TABLET BY MOUTH EVERY 4 HOURS AS NEEDED * Notice:   This list has 2 medication(s) that are the same as other medications prescribed for you. Read the directions carefully, and ask your doctor or other care provider to review them with you. CONTINUE taking these medications Instructions Each Dose to Equal  
 Morning Noon Evening Bedtime  
 acetaminophen 325 mg tablet Commonly known as:  TYLENOL Notes to Patient:  Take on as needed schedule Take 650 mg by mouth every four (4) hours as needed for Pain. 650 mg  
    
   
   
   
  
 ADULT ONE DAILY MULTIVITAMIN PO Take 1 Tab by mouth daily. 1 Tab  
    
  
   
   
   
  
 ascorbic acid (vitamin C) 500 mg tablet Commonly known as:  VITAMIN C Take 500 mg by mouth daily. 500 mg  
    
  
   
   
   
  
 aspirin delayed-release 81 mg tablet Take 81 mg by mouth daily. 81 mg  
    
  
   
   
   
  
 docusate sodium 50 mg capsule Commonly known as:  Pamela Louis Take 50 mg by mouth daily. 50 mg  
    
  
   
   
   
  
 escitalopram oxalate 20 mg tablet Commonly known as:  Tre Herr TAKE 1 TAB BY MOUTH DAILY. INDICATIONS: ANXIETY WITH DEPRESSION  
     
  
   
   
   
  
 fluticasone 50 mcg/actuation nasal spray Commonly known as:  Tony Carson Notes to Patient:  Take on as needed schedule 2 Sprays by Both Nostrils route daily as needed. 2 Spray  
    
   
   
   
  
 nitroglycerin 0.4 mg SL tablet Commonly known as:  NITROSTAT Notes to Patient:  As needed 0.4 mg by SubLINGual route every five (5) minutes as needed for Chest Pain. 0.4 mg OXYGEN-AIR DELIVERY SYSTEMS  
   
 3 L by Nasal route continuous. 3 L  
    
   
   
   
  
 simvastatin 20 mg tablet Commonly known as:  ZOCOR  
   
 TAKE 1 TABLET BY MOUTH EVERY DAY  
     
   
   
   
  
  
 spironolactone 25 mg tablet Commonly known as:  ALDACTONE Take 1 Tab by mouth two (2) times a day.  Pill bottles states 3.5 daily but patient is taking one in am and one in pm  
 25 mg  
    
  
   
   
  
   
 traZODone 50 mg tablet Commonly known as:  Christy Laith Take 0.5-1 Tabs by mouth nightly. 25-50 mg  
    
   
   
   
  
  
 warfarin 2.5 mg tablet Commonly known as:  COUMADIN Take 2.5 mg by mouth nightly. 2.5 mg Where to Get Your Medications These medications were sent to [facility], [address] [address] [facility], [place] [address] Phone:  [phone]  
  ciprofloxacin HCl 500 mg tablet  
 clindamycin 300 mg capsule Opioid Education Prescription Opioids: What You Need to Know: 
 
 
After general anesthesia or intravenous sedation, for 24 hours or while taking prescription Narcotics: · Limit your activities · Do not drive and operate hazardous machinery · Do not make important personal or business decisions · Do  not drink alcoholic beverages · If you have not urinated within 8 hours after discharge, please contact your surgeon on call. Report the following to your surgeon: 
· Excessive pain, swelling, redness or odor of or around the surgical area · Temperature over 100.5 · Nausea and vomiting lasting longer than 4 hours or if unable to take medications · Any signs of decreased circulation or nerve impairment to extremity: change in color, persistent  numbness, tingling, coldness or increase pain · Any questions What to do at Home: 
Recommended activity: Activity as tolerated, per MD instructions If you experience any of the following symptoms fever > 100.5, nausea, vomiting, pain, chest pain and/or shortness of breath to ER please follow up with MD. 
 
*  Please give a list of your current medications to your Primary Care Provider. *  Please update this list whenever your medications are discontinued, doses are 
    changed, or new medications (including over-the-counter products) are added. *  Please carry medication information at all times in case of emergency situations. These are general instructions for a healthy lifestyle: No smoking/ No tobacco products/ Avoid exposure to second hand smoke Surgeon General's Warning:  Quitting smoking now greatly reduces serious risk to your health. Obesity, smoking, and sedentary lifestyle greatly increases your risk for illness A healthy diet, regular physical exercise & weight monitoring are important for maintaining a healthy lifestyle You may be retaining fluid if you have a history of heart failure or if you experience any of the following symptoms:  Weight gain of 3 pounds or more overnight or 5 pounds in a week, increased swelling in our hands or feet or shortness of breath while lying flat in bed. Please call your doctor as soon as you notice any of these symptoms; do not wait until your next office visit. Recognize signs and symptoms of STROKE: 
 
F-face looks uneven A-arms unable to move or move unevenly S-speech slurred or non-existent T-time-call 911 as soon as signs and symptoms begin-DO NOT go Back to bed or wait to see if you get better-TIME IS BRAIN. Warning Signs of HEART ATTACK Call 911 if you have these symptoms: 
? Chest discomfort. Most heart attacks involve discomfort in the center of the chest that lasts more than a few minutes, or that goes away and comes back. It can feel like uncomfortable pressure, squeezing, fullness, or pain. ? Discomfort in other areas of the upper body.  Symptoms can include pain or discomfort in one or both arms, the back, neck, jaw, or stomach. ? Shortness of breath with or without chest discomfort. ? Other signs may include breaking out in a cold sweat, nausea, or lightheadedness. Don't wait more than five minutes to call [address]! Fast action can save your life. Calling 911 is almost always the fastest way to get lifesaving treatment. Emergency Medical Services staff can begin treatment when they arrive  up to an hour sooner than if someone gets to the hospital by car. The discharge information has been reviewed with the patient and caregiver. The patient and caregiver verbalized understanding. Discharge medications reviewed with the patient and caregiver and appropriate educational materials and side effects teaching were provided. ___________________________________________________________________________________________________________________________________ Skin Abscess: Care Instructions Your Care Instructions A skin abscess is a bacterial infection that forms a pocket of pus. A boil is a kind of skin abscess. The doctor may have cut an opening in the abscess so that the pus can drain out. You may have gauze in the cut so that the abscess will stay open and keep draining. You may need antibiotics. You will need to follow up with your doctor to make sure the infection has gone away. The doctor has checked you carefully, but problems can develop later. If you notice any problems or new symptoms, get medical treatment right away. Follow-up care is a key part of your treatment and safety. Be sure to make and go to all appointments, and call your doctor if you are having problems. It's also a good idea to know your test results and keep a list of the medicines you take. How can you care for yourself at home?  
· Apply warm and dry compresses, a heating pad set on low, or a hot water bottle 3 or 4 times a day for pain. Keep a cloth between the heat source and your skin. · If your doctor prescribed antibiotics, take them as directed. Do not stop taking them just because you feel better. You need to take the full course of antibiotics. · Take pain medicines exactly as directed. ¨ If the doctor gave you a prescription medicine for pain, take it as prescribed. ¨ If you are not taking a prescription pain medicine, ask your doctor if you can take an over-the-counter medicine. · Keep your bandage clean and dry. Change the bandage whenever it gets wet or dirty, or at least one time a day. · If the abscess was packed with gauze: 
¨ Keep follow-up appointments to have the gauze changed or removed. If the doctor instructed you to remove the gauze, follow the instructions you were given for how to remove it. ¨ After the gauze is removed, soak the area in warm water for 15 to 20 minutes 2 times a day, until the wound closes. When should you call for help? Call your doctor now or seek immediate medical care if: 
  · You have signs of worsening infection, such as: 
¨ Increased pain, swelling, warmth, or redness. ¨ Red streaks leading from the infected skin. ¨ Pus draining from the wound. ¨ A fever.  
 Watch closely for changes in your health, and be sure to contact your doctor if: 
  · You do not get better as expected. Where can you learn more? Go to http://macrina-thuy.info/. Enter I604 in the search box to learn more about \"Skin Abscess: Care Instructions. \" Current as of: April 18, 2018 Content Version: 11.8 © 5591-4314 Display[employer]. Care instructions adapted under license by [employer] (which disclaims liability or warranty for this information).  If you have questions about a medical condition or this instruction, always ask your healthcare professional. [address] any warranty or liability for your use of this information. Vacuum-Assisted Closure for Wound Healing: Care Instructions Your Care Instructions When you have a wound that is hard to close your doctor may treat it with vacuum-assisted closure (VAC). VAC uses negative pressure (suction) to help bring the edges of your wound together. It also removes fluid and dead tissue from the wound area. In VAC: 
· A special piece of foam or cotton gauze fits over your wound. This covers and protects the wound. A clear bandage (film dressing) goes several inches beyond the foam or gauze dressing to create a seal for the vacuum. · A tube connects the foam to a small machine called the therapy unit. The therapy unit creates the suction. · The [facility] system may be carried around (portable) or may stay in one place (stationary). VAC does not hurt. You may feel a mild pulling on the wound when treatment first starts. How long you need VAC depends on the size and type of wound you have and how well the [facility] works. You will be limited in what you can do while the wound heals. You will use VAC 24 hours a day. Follow-up care is a key part of your treatment and safety. Be sure to make and go to all appointments, and call your doctor if you are having problems. It's also a good idea to know your test results and keep a list of the medicines you take. How can you care for yourself at home? · A home health care worker will come to your home a few times a week to change the bandage and check the machine. You may need it changed more often if there is a lot of drainage. · Your doctor will give you information on what you can and can't do. This depends on where your wound is located. Your activities may be limited during the time you're using VAC. · You will be able to take sponge baths. Don't shower or take baths unless your doctor says it is okay. · Take pain medicines exactly as directed. ¨ If the doctor gave you a prescription medicine for pain, take it as prescribed. ¨ If you are not taking a prescription pain medicine, ask your doctor if you can take an over-the-counter medicine. · If your doctor prescribed antibiotics, take them as directed. Do not stop taking them just because you feel better. You need to take the full course of antibiotics. When should you call for help? Call 911 anytime you think you may need emergency care. For example, call if: 
  · You have a lot of bleeding or see a sudden change in the color or texture of the drainage.  
  · The wound splits open and organs under the skin can be seen (evisceration).  
 Call your doctor now or seek immediate medical care if: 
  · The wound starts bleeding.  
  · The bandage comes off. Cover the area with a sterile bandage until you can see your doctor or your home health care worker comes by.  
  · You have signs of infection, such as: 
¨ Increased pain, swelling, warmth, or redness around the wound. ¨ Red streaks leading from the wound. ¨ Pus draining from the wound. ¨ A fever.  
 Watch closely for changes in your health, and be sure to contact your doctor if: 
  · The noise the machine makes changes or gets very loud. This may mean the seal is broken or the machine is not producing enough suction. Where can you learn more? Go to http://macrina-thuy.info/. Enter M762 in the search box to learn more about \"Vacuum-Assisted Closure for Wound Healing: Care Instructions. \" Current as of: November 21, 2017 Content Version: 11.8 © 6074-8445 [employer]. Care instructions adapted under license by [employer] (which disclaims liability or warranty for this information). If you have questions about a medical condition or this instruction, always ask your healthcare professional. [place] [address] any warranty or liability for your use of this information. Introducing [facility]! Arnulfo Rice introduces MobGoldt patient portal. Now you can access parts of your medical record, email your doctor's office, and request medication refills online. 1. In your internet browser, go to [email or web address] 2. Click on the First Time User? Click Here link in the Sign In box. You will see the New Member Sign Up page. 3. Enter your OpenFeint Access Code exactly as it appears below. You will not need to use this code after youve completed the sign-up process. If you do not sign up before the expiration date, you must request a new code. · OpenFeint Access Code: [email or web address] Expires: 10/17/2018  8:55 AM 
 
4. Enter the last four digits of your Social Security Number (xxxx) and Date of Birth (mm/dd/yyyy) as indicated and click Submit. You will be taken to the next sign-up page. 5. Create a OpenFeint ID. This will be your OpenFeint login ID and cannot be changed, so think of one that is secure and easy to remember. 6. Create a OpenFeint password. You can change your password at any time. 7. Enter your Password Reset Question and Answer. This can be used at a later time if you forget your password. 8. Enter your e-mail address. You will receive e-mail notification when new information is available in [address] 9. Click Sign Up. You can now view and download portions of your medical record. 10. Click the Download Summary menu link to download a portable copy of your medical information. If you have questions, please visit the Frequently Asked Questions section of the OpenFeint website. Remember, OpenFeint is NOT to be used for urgent needs. For medical emergencies, dial 911. Now available from your iPhone and Android! Introducing Shaan Roger As a Arnulfo Rice patient, I wanted to make you aware of our electronic visit tool called Shaan Roger. Arnulfo Rice 24/7 allows you to connect within minutes with a medical provider 24 hours a day, seven days a week via a mobile device or tablet or logging into a secure website from your computer. You can access [employer] from anywhere in the [place]. A virtual visit might be right for you when you have a simple condition and feel like you just dont want to get out of bed, or cant get away from work for an appointment, when your regular [employer] provider is not available (evenings, weekends or holidays), or when youre out of town and need minor care. Electronic visits cost only $49 and if the [employer] 24/7 provider determines a prescription is needed to treat your condition, one can be electronically transmitted to a nearby pharmacy*. Please take a moment to enroll today if you have not already done so. The enrollment process is free and takes just a few minutes. To enroll, please download the [employer] 24/7 gama to your tablet or phone, or visit [email or web address] to enroll on your computer. And, as an [address] [place] [address] patient with a [employer] account, the results of your visits will be scanned into your electronic medical record and your primary care provider will be able to view the scanned results. We urge you to continue to see your regular [employer] provider for your ongoing medical care. And while your primary care provider may not be the one available when you seek a Shaan Attendererenfin virtual visit, the peace of mind you get from getting a real diagnosis real time can be priceless. For more information on [employer], view our Frequently Asked Questions (FAQs) at [email or web address]. Sincerely, 
 
Tj Brown MD 
Chief Medical Officer [address] *:  certain medications cannot be prescribed via [employer] Providers Seen During Your Hospitalization Provider Specialty Primary office phone Elizabeth Johnson MD Emergency Medicine [phone] Mei , [address] [place] [address] Internal Medicine [phone] Immunizations Administered for This Admission Name Date Influenza Vaccine (Quad) PF  Deferred () TB Skin Test (PPD) Intradermal  Deferred (), 10/6/2018 Your Primary Care Physician (PCP) Primary Care Physician Office Phone Office Fax Melany Diallo [phone] You are allergic to the following Allergen Reactions Sulfa (Sulfonamide Antibiotics) Hives Aspirin Nausea Only Cannot take uncoated aspirin Recent Documentation Height Weight BMI OB Status Smoking Status 1.575 m 94.8 kg 38.23 kg/m2 Postmenopausal Former Smoker Emergency Contacts Name Discharge Info Relation Home Work Mobile Teodoro Llanes DISCHARGE CAREGIVER [3] Daughter [21] [phone] Patient Belongings The following personal items are in your possession at time of discharge: 
  Dental Appliances: At home  Visual Aid: Glasses, At bedside      Home Medications: None   Jewelry: None  Clothing: At bedside    Other Valuables: None Please provide this summary of care documentation to your next provider. Signatures-by signing, you are acknowledging that this After Visit Summary has been reviewed with you and you have received a copy. Patient Signature:  ____________________________________________________________ Date:  ____________________________________________________________  
  
Gaby Lightsilvia Provider Signature:  ____________________________________________________________ Date:  ____________________________________________________________

## 2018-10-06 NOTE — ED TRIAGE NOTES
Pt arrives for wound check, she fell and had an open wound and was discharged on 9/23. Pt was supposed to be seen by home health after discharge. Pt was not sent home with abx at discharge. Pt's family has been wrapping leg and dressing it because home health has been missing some days. Pt states no drainage but wound is bleeding. Seen at [address] [place] [address] and sent here for debridement and abx. Denies fevers at home. Pt states she has had diarrhea for the past 4 days. States hx of anemia. States no pain to area of wound, states hx of diabetes, denies neuropathy

## 2018-10-06 NOTE — PROGRESS NOTES
CM met the daughter and completed intervention. Patient daughter Bartolo Elliott) states that patient lives alone in an apartment with no steps to enter into the home. Patient needs assistance with her ADL's and do have several DME's  in the home. Patient have services from [facility] x3 days a week for 3 hrs a day. States that patient was d/c from here on 9/23 with [facility] were pateint started off with everyday dressing changes and then x3 days a week dressing changes. Patient was seen at Urgent Care and was told to come to the ER and have been admitted. Daughter Bartolo Elliott) states that patient have great family support and undecided about d/c disposition at this time. CM will continue to follow patient to make sure we follow her needs. Care Management Interventions PCP Verified by CM: Yes (Dr. Kaye Sandhu) Mode of Transport at Discharge: Other (see comment) (Family) Transition of Care Consult (CM Consult): Discharge Planning, [facility] x3 days a week) [address]: Yes Discharge Durable Medical Equipment: No (Patient have several DME's in the home such as wheelchair, Triology, [employer], Tiolet riser, rollator walker, shower chair, grab bar, home O2 from [employer] ) Physical Therapy Consult: No 
Occupational Therapy Consult: No 
Speech Therapy Consult: No 
Current Support Network: Own Home Confirm Follow Up Transport: Family Plan discussed with Pt/Family/Caregiver: Yes Freedom of Choice Offered: Yes [employer] Information Provided?: No 
Discharge Location Discharge Placement: Home with home health (Patient will resume [facility] upon d/c)

## 2018-10-06 NOTE — PROGRESS NOTES
10/06/18 1730 Dual Skin Pressure Injury Assessment Second Care Provider (Based on [address] [place] [address]) [place] du sac RN Scars noted midsternal and left upper chest. 
Ecchymosis on abdomen, left upper arm. Firm ecchymotic area left upper outer buttocks. Right lower buttocks indentation with black center. Wound below left knee approximated 10 cm across and 8cm up/down Eschar in center, red moist tissue around; approximated 3 cm dark band around area with redness extending out. Left lower leg dark rough/scaly area approximated 7cm x 3 cm. Left lower leg 4 cm dark scaly area. Right lower leg discolored areas. Left wound cleansed with wound cleanser, covered with xeroform, 4x4and ABd dressing, per patient and family that is what the home health nurse was doing. No breakdown noted on sacrum or heels.

## 2018-10-06 NOTE — ED NOTES
TRANSFER - OUT REPORT: 
 
Verbal report given to Kathy(name) on [employer]  being transferred to [facility](unit) for routine progression of care Report consisted of patients Situation, Background, Assessment and  
Recommendations(SBAR). Information from the following report(s) SBAR, ED Summary and Recent Results was reviewed with the receiving nurse. Lines:  
Peripheral IV 10/06/18 Left Antecubital (Active) Site Assessment Clean, dry, & intact 10/6/2018  3:24 PM  
Phlebitis Assessment 0 10/6/2018  3:24 PM  
Infiltration Assessment 0 10/6/2018  3:24 PM  
Dressing Status Clean, dry, & intact 10/6/2018  3:24 PM  
  
 
Opportunity for questions and clarification was provided. Patient transported with: 
 [place]

## 2018-10-06 NOTE — PROGRESS NOTES
She was admitted to the hospital for about a week and went home 2 weeks ago per notes Es Allison, staff [address], [address]  /   [email or web address]

## 2018-10-06 NOTE — ED PROVIDER NOTES
HPI Comments: Patient developed a bullous lesion on her left knee after falling on it. She was admitted to the hospital for about a week and went home 2 weeks ago. In the hospital, she states to the bullous lesion was lanced and drained. She has had home health come out and do dressing changes. Over the past several days, the wound has started getting worse and started smelling badly. She isn't diabetic, was concerned about infection so she came here for evaluation. Elements of this note were created using speech recognition software. As such, errors of speech recognition may be present. Patient is a 77 y.o. female presenting with wound check. The history is provided by the patient. Wound Check Past Medical History:  
Diagnosis Date  Anticoagulated on Coumadin 7/9/2013 S/P AVR  CAD (coronary artery disease) 1/20/2013 5/8/14 PCI LAD with stent placed  Cardiomy[facility]  CHF (congestive heart failure) ([facility] 2/17/2017  CKD (chronic kidney disease) stage 3, GFR 30-59 ml/min ([facility]) 7/10/2013  COPD (chronic obstructive pulmonary disease) ([address] 4/2/2014  Essential hypertension, benign 1/20/2013  HLD (hyperlipidemia)  ICD (implantable cardioverter-defibrillator) in place 10/2/2014 Biotronik single-chamber ICD implantation 10/20/14  Iron deficiency anemia due to chronic blood loss 7/29/2009  MDS (myelodysplastic syndrome) ([facility] 12/17/2011 Procrit started in August, 2011 12/18/11 Procrit weekly and Iron stores. 5-12 12-13-12 good response to 3 weekly procrit did not need it last time  3-7-13 Pt doing well. Just wanted a \"check-up. \" Responding to Procrit every three weeks. 8-29-13 patient has missed some injections on recently restarted hemoglobin only issue , takes oral iron iron stores each time  REJI (obstructive sleep apnea)-cpap 4/2/2014  Osteoarthritis  Osteopenia  Quadrantanopia  Visual complaint 12/14/2015 Past Surgical History:  
Procedure Laterality Date [address]    HX AORTIC VALVE REPLACEMENT  ,   
 mechanical valve   HX  SECTION    
 HX CORONARY STENT PLACEMENT  May, 2014 STEMI with Stent placement.  HX ENDOSCOPY  2009 EGD [address] CATHETERIZATION    HX PACEMAKER  10/2/2014 Biotronik ICD  HX TUBAL LIGATION    
 IR BX BONE MARROW DIAGNOSTIC  2011 Family History:  
Problem Relation Age of Onset  Heart Disease Mother CHF  Hypertension Mother  Kidney Disease Mother  Heart Disease Father CHF  Lung Disease Father  Diabetes Father  Cancer Father   
  prostate  Hypertension Father  Heart Attack Father  Heart Disease Maternal Aunt  Diabetes Brother  Coronary Artery Disease Other  Breast Cancer Neg Hx Social History Social History  Marital status:  Spouse name: N/A  
 Number of children: N/A  
 Years of education: N/A Occupational History   Retired  
  deli Social History Main Topics  Smoking status: Former Smoker Packs/day: 0.25 Years: 42.00 Types: Cigarettes Start date: 10/1/1956 Quit date: 2014  Smokeless tobacco: Never Used  Alcohol use No  
 Drug use: No  
 Sexual activity: Not on file Other Topics Concern  Weight Concern Yes  Special Diet Yes Social History Narrative Lives with her daughter. Ambulates only short distances. ALLERGIES: Sulfa (sulfonamide antibiotics) and Aspirin Review of Systems Constitutional: Negative for chills and fever. Gastrointestinal: Negative for nausea and vomiting. All other systems reviewed and are negative. Vitals:  
 10/06/18 1520 BP: 123/73 Pulse: 81 Resp: 18 Temp: 97.4 °F (36.3 °C) SpO2: 94% Weight: 94.8 kg (209 lb) Height: 5' 2\" (1.575 m) Physical Exam  
 Constitutional: She is oriented to person, place, and time. She appears well-developed and well-nourished. HENT:  
Head: Normocephalic and atraumatic. Eyes: Conjunctivae are normal. Pupils are equal, round, and reactive to light. Neck: Normal range of motion. Neck supple. Musculoskeletal: She exhibits no edema or tenderness. Legs: 
Large open wound left anterior knee as indicated with small amount of foul-smelling discharge Neurological: She is alert and oriented to person, place, and time. Skin: Skin is warm and dry. Psychiatric: She has a normal mood and affect. Her behavior is normal.  
Nursing note and vitals reviewed. MDM Number of Diagnoses or Management Options Diabetic ulcer of knee [facility]): new and requires workup Leukocytosis, unspecified type:  
Pulmonary hypertension ([facility]): Type 2 diabetes mellitus with nephropathy [facility]):  
Diagnosis management comments: 4:10 PM discussed results with patient and granddaughter, need for admission. Spoke with the hospitalist, to see patient. Search of old records shows she was admitted here at the end of September. She had the lysis of 8 or medical hematoma to her left knee which was uncomplicated at that time Amount and/or Complexity of Data Reviewed Clinical lab tests: ordered and reviewed Decide to obtain previous medical records or to obtain history from someone other than the patient: yes Review and summarize past medical records: yes Discuss the patient with other providers: yes Risk of Complications, Morbidity, and/or Mortality Presenting problems: moderate Diagnostic procedures: moderate Management options: moderate Patient Progress Patient progress: improved ED Course Procedures

## 2018-10-07 LAB
ANION GAP SERPL CALC-SCNC: 3 MMOL/L (ref 7–16)
APTT PPP: 69.3 SEC (ref 23.2–35.3)
BASOPHILS # BLD: 0.1 K/UL (ref 0–0.2)
BASOPHILS NFR BLD: 1 % (ref 0–2)
BUN SERPL-MCNC: 38 MG/DL (ref 8–23)
CALCIUM SERPL-MCNC: 9 MG/DL (ref 8.3–10.4)
CHLORIDE SERPL-SCNC: 100 MMOL/L (ref 98–107)
CO2 SERPL-SCNC: 39 MMOL/L (ref 21–32)
CREAT SERPL-MCNC: 1.5 MG/DL (ref 0.6–1)
DIFFERENTIAL METHOD BLD: ABNORMAL
EOSINOPHIL # BLD: 0.2 K/UL (ref 0–0.8)
EOSINOPHIL NFR BLD: 2 % (ref 0.5–7.8)
ERYTHROCYTE [DISTWIDTH] IN BLOOD BY AUTOMATED COUNT: 14.6 %
GLUCOSE BLD STRIP.AUTO-MCNC: 105 MG/DL (ref 65–100)
GLUCOSE BLD STRIP.AUTO-MCNC: 125 MG/DL (ref 65–100)
GLUCOSE BLD STRIP.AUTO-MCNC: 186 MG/DL (ref 65–100)
GLUCOSE BLD STRIP.AUTO-MCNC: 92 MG/DL (ref 65–100)
GLUCOSE SERPL-MCNC: 58 MG/DL (ref 65–100)
HCT VFR BLD AUTO: 29.8 % (ref 35.8–46.3)
HGB BLD-MCNC: 9 G/DL (ref 11.7–15.4)
IMM GRANULOCYTES # BLD: 0 K/UL (ref 0–0.5)
IMM GRANULOCYTES NFR BLD AUTO: 0 % (ref 0–5)
INR PPP: 3
LYMPHOCYTES # BLD: 3.1 K/UL (ref 0.5–4.6)
LYMPHOCYTES NFR BLD: 35 % (ref 13–44)
MCH RBC QN AUTO: 28.7 PG (ref 26.1–32.9)
MCHC RBC AUTO-ENTMCNC: 30.2 G/DL (ref 31.4–35)
MCV RBC AUTO: 94.9 FL (ref 79.6–97.8)
MM INDURATION POC: 0 MM (ref 0–5)
MONOCYTES # BLD: 1 K/UL (ref 0.1–1.3)
MONOCYTES NFR BLD: 11 % (ref 4–12)
NEUTS SEG # BLD: 4.4 K/UL (ref 1.7–8.2)
NEUTS SEG NFR BLD: 51 % (ref 43–78)
NRBC # BLD: 0 K/UL (ref 0–0.2)
PLATELET # BLD AUTO: 199 K/UL (ref 150–450)
PLATELET COMMENTS,PCOM: ADEQUATE
PMV BLD AUTO: 10.5 FL (ref 9.4–12.3)
POTASSIUM SERPL-SCNC: 4.4 MMOL/L (ref 3.5–5.1)
PPD POC: NEGATIVE NEGATIVE
PROTHROMBIN TIME: 29.4 SEC (ref 11.5–14.5)
RBC # BLD AUTO: 3.14 M/UL (ref 4.05–5.2)
RBC MORPH BLD: ABNORMAL
SODIUM SERPL-SCNC: 142 MMOL/L (ref 136–145)
WBC # BLD AUTO: 8.8 K/UL (ref 4.3–11.1)
WBC MORPH BLD: ABNORMAL

## 2018-10-07 PROCEDURE — 74011000250 HC RX REV CODE- 250: Performed by: INTERNAL MEDICINE

## 2018-10-07 PROCEDURE — 65270000029 HC RM PRIVATE

## 2018-10-07 PROCEDURE — 74011000258 HC RX REV CODE- 258: Performed by: NURSE PRACTITIONER

## 2018-10-07 PROCEDURE — 3331090002 HH PPS REVENUE DEBIT

## 2018-10-07 PROCEDURE — 77010033678 HC OXYGEN DAILY

## 2018-10-07 PROCEDURE — 74011250637 HC RX REV CODE- 250/637: Performed by: NURSE PRACTITIONER

## 2018-10-07 PROCEDURE — 94640 AIRWAY INHALATION TREATMENT: CPT

## 2018-10-07 PROCEDURE — 3331090001 HH PPS REVENUE CREDIT

## 2018-10-07 PROCEDURE — 74011250636 HC RX REV CODE- 250/636: Performed by: NURSE PRACTITIONER

## 2018-10-07 PROCEDURE — 85610 PROTHROMBIN TIME: CPT

## 2018-10-07 PROCEDURE — 94760 N-INVAS EAR/PLS OXIMETRY 1: CPT

## 2018-10-07 PROCEDURE — 0JBP0ZZ EXCISION OF LEFT LOWER LEG SUBCUTANEOUS TISSUE AND FASCIA, OPEN APPROACH: ICD-10-PCS | Performed by: SURGERY

## 2018-10-07 PROCEDURE — 80048 BASIC METABOLIC PNL TOTAL CA: CPT

## 2018-10-07 PROCEDURE — 85730 THROMBOPLASTIN TIME PARTIAL: CPT

## 2018-10-07 PROCEDURE — 82962 GLUCOSE BLOOD TEST: CPT

## 2018-10-07 PROCEDURE — 97165 OT EVAL LOW COMPLEX 30 MIN: CPT

## 2018-10-07 PROCEDURE — 74011636637 HC RX REV CODE- 636/637: Performed by: NURSE PRACTITIONER

## 2018-10-07 PROCEDURE — 36415 COLL VENOUS BLD VENIPUNCTURE: CPT

## 2018-10-07 PROCEDURE — 85025 COMPLETE CBC W/AUTO DIFF WBC: CPT

## 2018-10-07 RX ORDER — WARFARIN 2 MG/1
2 TABLET ORAL
Status: DISCONTINUED | OUTPATIENT
Start: 2018-10-07 | End: 2018-10-08

## 2018-10-07 RX ORDER — TRAZODONE HYDROCHLORIDE 50 MG/1
100 TABLET ORAL
Status: DISCONTINUED | OUTPATIENT
Start: 2018-10-07 | End: 2018-10-10 | Stop reason: HOSPADM

## 2018-10-07 RX ADMIN — ALBUTEROL SULFATE 2.5 MG: 2.5 SOLUTION RESPIRATORY (INHALATION) at 14:01

## 2018-10-07 RX ADMIN — FERROUS GLUCONATE 1 TABLET: 324 TABLET ORAL at 17:47

## 2018-10-07 RX ADMIN — INSULIN LISPRO 2 UNITS: 100 INJECTION, SOLUTION INTRAVENOUS; SUBCUTANEOUS at 12:00

## 2018-10-07 RX ADMIN — BUDESONIDE 500 MCG: 0.5 INHALANT RESPIRATORY (INHALATION) at 07:35

## 2018-10-07 RX ADMIN — SPIRONOLACTONE 25 MG: 25 TABLET ORAL at 08:44

## 2018-10-07 RX ADMIN — TIOTROPIUM BROMIDE 18 MCG: 18 CAPSULE ORAL; RESPIRATORY (INHALATION) at 07:35

## 2018-10-07 RX ADMIN — SIMVASTATIN 20 MG: 20 TABLET, FILM COATED ORAL at 21:16

## 2018-10-07 RX ADMIN — ALBUTEROL SULFATE 2.5 MG: 2.5 SOLUTION RESPIRATORY (INHALATION) at 07:35

## 2018-10-07 RX ADMIN — Medication 10 ML: at 15:14

## 2018-10-07 RX ADMIN — ALBUTEROL SULFATE 2.5 MG: 2.5 SOLUTION RESPIRATORY (INHALATION) at 21:19

## 2018-10-07 RX ADMIN — MULTIPLE VITAMINS W/ MINERALS TAB 1 TABLET: TAB at 08:43

## 2018-10-07 RX ADMIN — TRAZODONE HYDROCHLORIDE 100 MG: 50 TABLET ORAL at 21:17

## 2018-10-07 RX ADMIN — FERROUS GLUCONATE 1 TABLET: 324 TABLET ORAL at 08:43

## 2018-10-07 RX ADMIN — VITAMIN D, TAB 1000IU (100/BT) 2000 UNITS: 25 TAB at 08:42

## 2018-10-07 RX ADMIN — Medication 1 AMPULE: at 21:16

## 2018-10-07 RX ADMIN — ESCITALOPRAM OXALATE 20 MG: 10 TABLET ORAL at 08:42

## 2018-10-07 RX ADMIN — VANCOMYCIN HYDROCHLORIDE 1000 MG: 1 INJECTION, POWDER, LYOPHILIZED, FOR SOLUTION INTRAVENOUS at 17:43

## 2018-10-07 RX ADMIN — WARFARIN SODIUM 2 MG: 2 TABLET ORAL at 17:47

## 2018-10-07 RX ADMIN — CEFTRIAXONE SODIUM 2 G: 2 INJECTION, POWDER, FOR SOLUTION INTRAMUSCULAR; INTRAVENOUS at 17:02

## 2018-10-07 RX ADMIN — CARVEDILOL 6.25 MG: 6.25 TABLET, FILM COATED ORAL at 08:42

## 2018-10-07 RX ADMIN — CARVEDILOL 6.25 MG: 6.25 TABLET, FILM COATED ORAL at 17:47

## 2018-10-07 RX ADMIN — ASPIRIN 81 MG: 81 TABLET, COATED ORAL at 08:41

## 2018-10-07 RX ADMIN — Medication 1 AMPULE: at 08:40

## 2018-10-07 RX ADMIN — Medication 10 ML: at 21:20

## 2018-10-07 RX ADMIN — FUROSEMIDE 40 MG: 40 TABLET ORAL at 08:43

## 2018-10-07 RX ADMIN — OXYCODONE HYDROCHLORIDE AND ACETAMINOPHEN 500 MG: 500 TABLET ORAL at 08:41

## 2018-10-07 RX ADMIN — BUDESONIDE 500 MCG: 0.5 INHALANT RESPIRATORY (INHALATION) at 21:19

## 2018-10-07 RX ADMIN — SPIRONOLACTONE 25 MG: 25 TABLET ORAL at 17:47

## 2018-10-07 RX ADMIN — SACUBITRIL AND VALSARTAN 1 TABLET: 24; 26 TABLET, FILM COATED ORAL at 21:17

## 2018-10-07 NOTE — PROGRESS NOTES
Warfarin dosing per pharmacist 
 
Charisse Coon is a 77 y.o. female. Height: 5' 2\" (157.5 cm)    Weight: 94.8 kg (209 lb) Indication:  AVR Goal INR:  2-3 Home dose:  2.5 mg daily Risk factors or significant drug interactions:  N/A Other anticoagulants:  N/A Daily Monitoring Date  INR     Warfarin dose HGB              Notes 10/6  2.6  2.5 mg  10.9 The patient is followed by a warfarin clinic which she had a meeting with on 10/5 with an INR of 2.6. Thank you, RAJESH! Surendra, Pharm D, BCPS Clinical Pharmacist

## 2018-10-07 NOTE — PROGRESS NOTES
Problem: Self Care Deficits Care Plan (Adult) Goal: *Acute Goals and Plan of Care (Insert Text) OCCUPATIONAL THERAPY: Initial Assessment and Discharge 10/7/2018 INPATIENT: Hospital Day: 2 [place] / [place] / Product Type: Medicare /  
  
NAME/AGE/GENDER: Pirscila Funk is a 77 y.o. female PRIMARY DIAGNOSIS:  Abscess Abscess Abscess ICD-10: Treatment Diagnosis:  
 · Generalized Muscle Weakness (M62.81) Precautions/Allergies: 
   Sulfa (sulfonamide antibiotics) and Aspirin ASSESSMENT:  
 
Ms. Dorothy Rocha was admitted with L knee abscess, non-healing wound. Pt has a recent history of fall with injury to L knee and subsequent rehab stay, now has [facility] rehab services. Pt lives alone, was able to complete ADLs independently but reports that it takes her max extra time, has a HH caregiver 4 days/ week to assist. Pt does not use an AD for mobility. This session, pt demonstrated ADLs and mobility for aDLs with SBA. Pt has < RUE strength, ROM, and coordination from old injury. Pt does not demonstrate any functional decline and does not require skilled OT services in the hospital setting at this time. This section established at most recent assessment PROBLEM LIST (Impairments causing functional limitations): 1. Decreased Strength INTERVENTIONS PLANNED: (Benefits and precautions of occupational therapy have been discussed with the patient.) 1. n/a  
 
TREATMENT PLAN: Frequency/Duration: Follow patient 0 to address above goals. Rehabilitation Potential For Stated Goals: n/a  
 
RECOMMENDED REHABILITATION/EQUIPMENT: (at time of discharge pending progress): Due to the probability of continued deficits (see above) this patient will not likely need continued skilled occupational therapy after discharge. Equipment:  
? pt has all necessary DME 
? None at this time OCCUPATIONAL PROFILE AND HISTORY:  
 History of Present Injury/Illness (Reason for Referral): 
See H&P Past Medical History/Comorbidities: Ms. Brayden Guerin  has a past medical history of Anticoagulated on Coumadin (2013); CAD (coronary artery disease) (2013); Cardiomyopathy [facility]); CHF (congestive heart failure) ([facility] (2017); CKD (chronic kidney disease) stage 3, GFR 30-59 ml/min ([facility]) (7/10/2013); COPD (chronic obstructive pulmonary disease) ([facility] (2014); Essential hypertension, benign (2013); HLD (hyperlipidemia); ICD (implantable cardioverter-defibrillator) in place (10/2/2014); Iron deficiency anemia due to chronic blood loss (2009); MDS (myelodysplastic syndrome) ([facility] (2011); REJI (obstructive sleep apnea)-cpap (2014); Osteoarthritis; Osteopenia; Quadrantanopia; and Visual complaint (2015). She also has no past medical history of Contact dermatitis and other eczema, due to unspecified cause; Difficult intubation; Malignant hyperthermia due to anesthesia; Nausea & vomiting; Pseudocholinesterase deficiency; or Unspecified adverse effect of anesthesia. Ms. Brayden Guerin  has a past surgical history that includes cardiac catheterization (); ir bx bone marrow diagnostic (2011); hx aortic valve replacement (, ); hx  section; hx tubal ligation; hx heart catheterization (); hx coronary stent placement (May, 2014); hx pacemaker (10/2/2014); and hx endoscopy (2009). Social History/Living Environment:  
Home Environment: Apartment # Steps to Enter: 4 One/Two Story Residence: One story Living Alone: Yes Support Systems: Family member(s), Home care staff Patient Expects to be Discharged to[de-identified] [id number] Current DME Used/Available at Home: Shower chair, Grab bars, Commode, bedside, Raised toilet seat, Walker, rolling Tub or Shower Type: Tub/Shower combination Prior Level of Function/Work/Activity: 
t lives alone, was able to complete ADLs independently but reports that it takes her max extra time, has a HH caregiver 4 days/ week to assist. Pt does not use an AD for mobility. Number of Personal Factors/Comorbidities that affect the Plan of Care: Brief history (0):  LOW COMPLEXITY ASSESSMENT OF OCCUPATIONAL PERFORMANCE[de-identified]  
Activities of Daily Living:  
Basic ADLs (From Assessment) Complex ADLs (From Assessment) Feeding: Independent Oral Facial Hygiene/Grooming: Independent Bathing: Independent Upper Body Dressing: Independent Lower Body Dressing: Modified independent Toileting: Independent Instrumental ADL Meal Preparation: Minimum assistance Homemaking: Minimum assistance Medication Management: Independent Grooming/Bathing/Dressing Activities of Daily Living Cognitive Retraining Safety/Judgement: Awareness of environment; Fall prevention Bed/Mat Mobility Supine to Sit: Modified independent Sit to Stand: Stand-by assistance Bed to Chair: Stand-by assistance Most Recent Physical Functioning:  
Gross Assessment: 
AROM: Generally decreased, functional (< RUE) Strength: Generally decreased, functional (< RUE) Coordination: Generally decreased, functional (< RUE) Tone: Normal 
Sensation: Intact Posture: 
  
Balance: 
Sitting: Intact Standing: Intact Bed Mobility: 
Supine to Sit: Modified independent Wheelchair Mobility: 
  
Transfers: 
Sit to Stand: Stand-by assistance Stand to Sit: Stand-by assistance Bed to Chair: Stand-by assistance Patient Vitals for the past 6 hrs: 
 BP BP Patient Position SpO2 O2 Flow Rate (L/min) Pulse 10/07/18 0704 - - - 3 l/min -  
10/07/18 0736 - - 96 % 3 l/min -  
10/07/18 0754 108/58 Sitting 93 % - (!) 48  
10/07/18 0838 - - - - 72  
10/07/18 1036 101/48 At rest 97 % - 85 Mental Status Neurologic State: Alert Orientation Level: Oriented X4 Cognition: Appropriate decision making, Follows commands Perception: Appears intact Perseveration: No perseveration noted Safety/Judgement: Awareness of environment, Fall prevention Physical Skills Involved: 1. Range of Motion 2. Strength Cognitive Skills Affected (resulting in the inability to perform in a timely and safe manner): 1. none Psychosocial Skills Affected: 1. none Number of elements that affect the Plan of Care: 1-3:  LOW COMPLEXITY CLINICAL DECISION MAKIN[address] [place] [address] AM-PAC 6 Clicks Daily Activity Inpatient Short Form How much help from another person does the patient currently need. .. Total A Lot A Little None 1. Putting on and taking off regular lower body clothing? [] 1   [] 2   [] 3   [x] 4  
2. Bathing (including washing, rinsing, drying)? [] 1   [] 2   [] 3   [x] 4  
3. Toileting, which includes using toilet, bedpan or urinal?   [] 1   [] 2   [] 3   [x] 4  
4. Putting on and taking off regular upper body clothing? [] 1   [] 2   [] 3   [x] 4  
5. Taking care of personal grooming such as brushing teeth? [] 1   [] 2   [] 3   [x] 4  
6. Eating meals? [] 1   [] 2   [] 3   [x] 4  
© , Trustees of [address] [place] [address], under license to [employer]. All rights reserved Score:  Initial: 24 Most Recent: X (Date: -- ) Interpretation of Tool:  Represents activities that are increasingly more difficult (i.e. Bed mobility, Transfers, Gait). Score 24 23 22-20 19-15 14-10 9-7 6 Modifier CH CI CJ CK CL CM CN   
 
? Self Care:  
  - CURRENT STATUS: CH - 0% impaired, limited or restricted  - GOAL STATUS: CH - 0% impaired, limited or restricted  - D/C STATUS:  CH - 0% impaired, limited or restricted [place] / [place] / Product Type: Medicare /   
 
Medical Necessity:    
· n/a. Reason for Services/Other Comments: 
· n/a. Use of outcome tool(s) and clinical judgement create a POC that gives a: LOW COMPLEXITY  
 
 
 
TREATMENT:  
 (In addition to Assessment/Re-Assessment sessions the following treatments were rendered) Pre-treatment Symptoms/Complaints:   
Pain: Initial:  
Pain Intensity 1: 0  Post Session:  0 Assessment/Reassessment only, no treatment provided today Braces/Orthotics/Lines/Etc:  
· O2 Device: Nasal cannula Treatment/Session Assessment:   
· Response to Treatment:  No adverse reaction · Interdisciplinary Collaboration:  
o Occupational Therapist 
o Registered Nurse · After treatment position/precautions:  
o Up in chair 
o Bed/Chair-wheels locked 
o Call light within reach 
o RN notified · Compliance with Program/Exercises: n/a. · Recommendations/Intent for next treatment session: \"Next visit will focus on n/a\". Total Treatment Duration: OT Patient Time In/Time Out Time In: 9047 Time Out: 1150 Gurwinder Beckford OT

## 2018-10-07 NOTE — PROGRESS NOTES
Pt and daughter state there are several changes to PTA med list: She no longer takesTums, Imdur, or Incruse Ellipta. Her dosages have changed on the following: Coreg decreased to 3.125mg bid, lasix decreased to 20mg bid, Entresto 24/26 decreased to once a day, Coumadin decreased to 2.5mg qhs,.  Colace 100mg daily added to med list. these changes were adjusted on PTA med list.

## 2018-10-07 NOTE — PROGRESS NOTES
Hospitalist Progress Note Subjective:  
Daily Progress Note: 10/7/2018 12:05 PM 
 
Patient is a 77 y.o.  female with extensive history on chronic coumadin who initially presented to ER 9/7 after fall in bathroom 8/28 where she landed directly on her left knee with complaints of dizziness and fatigue and found to  Have Hgb of 6.7. She was transfused to Hgb of 9.6. She also was found with a large hematoma/bullae over her left knee and INR of 4.3. She underwent an I+D per Dr Jesse Valente on 9/11 with a large amount of serosanguinous fluid evacuated. She has been followed by home health PT, OT and wound care since discharge from rehab on ninth floor 9/11-9/21. She has done well except the wound has not closed. Two days ago, [facility] RN and daughter who is an RN noted mild erythema and heat surrounding upper half of wound. Open area approx 5-6 cm in circumference. No purulent drainage, oozing small amounts of dark blood with slightly foul odor. Full ROM to knee. All distal circulation, motion and sensation WNL for patient. No distal edema, mild local edema. Multiple old scars from prior lower extremity wounds. Patient denies pain to area, fever, systemic symptoms. She does not appear septic and lactic acid is 1.8. WBC is 11.6 without shift. Lipase 620. Wound cultured in ER prior to administration of antibiotics. Begun on vancomycin and ceftriaxone until cultures resulted. Large, attentive family at bedside. Creatinine 1.48 on admission with baseline of 1.2. Of note, patient reports diarrhea x 4 days without use of antibiotics, additional GI symptoms.  [id number] patient for her coumadin dosing, was cut from 5 mg daily to 2.5 mg daily on 9/25 with INR of 3.9 (target: 2-3). She also has MDS syndrome, sees Hematology and receives regular blood transfusions and procrit. 10/7:  WBC down to 8.8, Hgb: 9.0 from 10.9. INR: 3.0 today.   Creatinine 1.50 up from 1.48. Preliminary wound culture growing Gram Negative Rods. Surgery in.  PT/OT in. Patient exhausted, did not sleep last night. Current Facility-Administered Medications Medication Dose Route Frequency  warfarin (COUMADIN) tablet 2 mg  2 mg Oral QHS  sodium chloride (NS) flush 5-10 mL  5-10 mL IntraVENous Q8H  
 sodium chloride (NS) flush 5-10 mL  5-10 mL IntraVENous PRN  
 acetaminophen (TYLENOL) tablet 650 mg  650 mg Oral Q4H PRN  
 traMADol (ULTRAM) tablet 50 mg  50 mg Oral Q6H PRN  
 traMADol (ULTRAM) tablet 100 mg  100 mg Oral Q6H PRN  
 tuberculin injection 5 Units  5 Units IntraDERMal ONCE  
 cefTRIAXone (ROCEPHIN) 2 g in 0.9% sodium chloride (MBP/ADV) 50 mL  2 g IntraVENous Q24H  
 insulin lispro (HUMALOG) injection   SubCUTAneous AC&HS  
 albuterol (PROVENTIL VENTOLIN) nebulizer solution 2.5 mg  2.5 mg Nebulization Q4H PRN  
 ascorbic acid (vitamin C) (VITAMIN C) tablet 500 mg  500 mg Oral DAILY  aspirin delayed-release tablet 81 mg  81 mg Oral DAILY  calcium carbonate (TUMS) chewable tablet 200 mg [elemental]  200 mg Oral BID  carvedilol (COREG) tablet 6.25 mg  6.25 mg Oral BID WITH MEALS  cholecalciferol (VITAMIN D3) tablet 2,000 Units  2,000 Units Oral DAILY  escitalopram oxalate (LEXAPRO) tablet 20 mg  20 mg Oral DAILY  ferrous gluconate 324 mg (38 mg iron) tablet 1 Tab  1 Tab Oral BID  fluticasone (FLONASE) 50 mcg/actuation nasal spray 2 Spray  2 Spray Both Nostrils DAILY PRN  
 furosemide (LASIX) tablet 40 mg  40 mg Oral DAILY  isosorbide mononitrate ER (IMDUR) tablet 30 mg  30 mg Oral DAILY  loratadine (CLARITIN) tablet 10 mg  10 mg Oral DAILY PRN  
 multivitamin, tx-iron-ca-min (THERA-M w/ IRON) tablet 1 Tab  1 Tab Oral DAILY  sacubitril-valsartan (ENTRESTO) 24-26 mg tablet 1 Tab  1 Tab Oral BID  simvastatin (ZOCOR) tablet 20 mg  20 mg Oral QHS  spironolactone (ALDACTONE) tablet 25 mg  25 mg Oral BID  
  traZODone (DESYREL) tablet 25-50 mg  25-50 mg Oral QHS  tiotropium (SPIRIVA) inhalation capsule 18 mcg  18 mcg Inhalation DAILY  budesonide (PULMICORT) 500 mcg/2 ml nebulizer suspension  500 mcg Nebulization BID RT And  
 albuterol (PROVENTIL VENTOLIN) nebulizer solution 2.5 mg  2.5 mg Nebulization Q6HWA RT  
 influenza vaccine 2018-19 (6 mos+)(PF) (FLUARIX QUAD/FLULAVAL QUAD) injection 0.5 mL  0.5 mL IntraMUSCular PRIOR TO DISCHARGE  vancomycin (VANCOCIN) 1,000 mg in 0.9% sodium chloride (MBP/ADV) 250 mL  1,000 mg IntraVENous Q24H  
 alcohol 62% (NOZIN) nasal  1 Ampule  1 Ampule Topical Q12H Review of Systems A comprehensive review of systems was negative except for that written in the HPI. Objective:  
 
Visit Vitals  /48 (BP 1 Location: Right arm, BP Patient Position: At rest)  Pulse 85  Temp 97.8 °F (36.6 °C)  Resp 18  Ht 5' 2\" (1.575 m)  Wt 94.8 kg (209 lb)  SpO2 97%  BMI 38.23 kg/m2 O2 Flow Rate (L/min): 3 l/min O2 Device: Nasal cannula Temp (24hrs), Av.1 °F (36.7 °C), Min:97.4 °F (36.3 °C), Max:99.2 °F (37.3 °C) 10/05 1901 - 10/07 0700 In: -  
Out: 031 [[facility]:[id number]] General appearance: Oriented and alert, cooperative, very pleasant, denies pain. Morbid obesity. Head: Normocephalic, without obvious abnormality, atraumatic Eyes: conjunctivae/corneas clear. PERRL, EOM's intact. Fundi benign Neck: supple, symmetrical, trachea midline, no JVD Lungs: clear to auscultation bilaterally Heart: regular rate and rhythm, S1, S2 normal, no murmur, click, rub or gallop Abdomen: soft, non-tender. Bowel sounds normal. No masses,  no organomegaly Extremities: See above for left knee exam, area marked q 24 hours. All other extremities normal, atraumatic, no cyanosis or edema Skin: Skin color, texture, turgor normal. No rashes or lesions Neurologic: Grossly normal 
 
Orders, test results, vitals, notes reviewed. Data Review Recent Results (from the past 24 hour(s)) CBC WITH AUTOMATED DIFF Collection Time: 10/06/18  3:25 PM  
Result Value Ref Range WBC 11.6 (H) 4.3 - 11.1 K/uL  
 RBC 3.75 (L) 4.05 - 5.2 M/uL  
 HGB 10.9 (L) 11.7 - 15.4 g/dL HCT 35.5 (L) 35.8 - 46.3 % MCV 94.7 79.6 - 97.8 FL  
 MCH 29.1 26.1 - 32.9 PG  
 MCHC 30.7 (L) 31.4 - 35.0 g/dL  
 RDW 14.5 % PLATELET 107 819 - 701 K/uL MPV 10.1 9.4 - 12.3 FL ABSOLUTE NRBC 0.00 0.0 - 0.2 K/uL  
 DF AUTOMATED NEUTROPHILS 67 43 - 78 % LYMPHOCYTES 23 13 - 44 % MONOCYTES 9 4.0 - 12.0 % EOSINOPHILS 1 0.5 - 7.8 % BASOPHILS 0 0.0 - 2.0 % IMMATURE GRANULOCYTES 0 0.0 - 5.0 %  
 ABS. NEUTROPHILS 7.7 1.7 - 8.2 K/UL  
 ABS. LYMPHOCYTES 2.7 0.5 - 4.6 K/UL  
 ABS. MONOCYTES 1.0 0.1 - 1.3 K/UL  
 ABS. EOSINOPHILS 0.1 0.0 - 0.8 K/UL  
 ABS. BASOPHILS 0.0 0.0 - 0.2 K/UL  
 ABS. IMM. GRANS. 0.0 0.0 - 0.5 K/UL METABOLIC PANEL, COMPREHENSIVE Collection Time: 10/06/18  3:25 PM  
Result Value Ref Range Sodium 139 136 - 145 mmol/L Potassium 4.6 3.5 - 5.1 mmol/L Chloride 97 (L) 98 - 107 mmol/L  
 CO2 39 (H) 21 - 32 mmol/L Anion gap 3 (L) 7 - 16 mmol/L Glucose 119 (H) 65 - 100 mg/dL BUN 38 (H) 8 - 23 MG/DL Creatinine 1.48 (H) 0.6 - 1.0 MG/DL  
 GFR est AA 45 (L) >60 ml/min/1.73m2 GFR est non-AA 37 (L) >60 ml/min/1.73m2 Calcium 10.0 8.3 - 10.4 MG/DL Bilirubin, total 0.2 0.2 - 1.1 MG/DL  
 ALT (SGPT) 13 12 - 65 U/L  
 AST (SGOT) 13 (L) 15 - 37 U/L Alk. phosphatase 63 50 - 136 U/L Protein, total 8.1 6.3 - 8.2 g/dL Albumin 3.5 3.2 - 4.6 g/dL Globulin 4.6 (H) 2.3 - 3.5 g/dL A-G Ratio 0.8 (L) 1.2 - 3.5 LIPASE Collection Time: 10/06/18  3:25 PM  
Result Value Ref Range Lipase 620 (H) 73 - 393 U/L  
PROTHROMBIN TIME + INR Collection Time: 10/06/18  3:25 PM  
Result Value Ref Range Prothrombin time 26.8 (H) 11.5 - 14.5 sec INR 2.6 PTT Collection Time: 10/06/18  3:25 PM  
Result Value Ref Range aPTT 55.2 (H) 23.2 - 35.3 SEC POC LACTIC ACID Collection Time: 10/06/18  3:33 PM  
Result Value Ref Range Lactic Acid (POC) 1.8 0.5 - 1.9 mmol/L  
CULTURE, WOUND W GRAM STAIN Collection Time: 10/06/18  4:47 PM  
Result Value Ref Range Special Requests: SWAB 
MISC. WOUND 
KNEE 
    
 GRAM STAIN MODERATE GRAM NEGATIVE RODS    
 GRAM STAIN FEW GRAM POSITIVE COCCI    
 GRAM STAIN 0 TO 2 WBC'S PER OIF Culture result: MODERATE GRAM NEGATIVE RODS SUBCULTURE IN PROGRESS (A) GLUCOSE, POC Collection Time: 10/06/18  8:35 PM  
Result Value Ref Range Glucose (POC) 270 (H) 65 - 100 mg/dL METABOLIC PANEL, BASIC Collection Time: 10/07/18  3:32 AM  
Result Value Ref Range Sodium 142 136 - 145 mmol/L Potassium 4.4 3.5 - 5.1 mmol/L Chloride 100 98 - 107 mmol/L  
 CO2 39 (H) 21 - 32 mmol/L Anion gap 3 (L) 7 - 16 mmol/L Glucose 58 (L) 65 - 100 mg/dL BUN 38 (H) 8 - 23 MG/DL Creatinine 1.50 (H) 0.6 - 1.0 MG/DL  
 GFR est AA 44 (L) >60 ml/min/1.73m2 GFR est non-AA 36 (L) >60 ml/min/1.73m2 Calcium 9.0 8.3 - 10.4 MG/DL  
CBC WITH AUTOMATED DIFF Collection Time: 10/07/18  3:32 AM  
Result Value Ref Range WBC 8.8 4.3 - 11.1 K/uL  
 RBC 3.14 (L) 4.05 - 5.2 M/uL HGB 9.0 (L) 11.7 - 15.4 g/dL HCT 29.8 (L) 35.8 - 46.3 % MCV 94.9 79.6 - 97.8 FL  
 MCH 28.7 26.1 - 32.9 PG  
 MCHC 30.2 (L) 31.4 - 35.0 g/dL  
 RDW 14.6 % PLATELET 354 873 - 208 K/uL MPV 10.5 9.4 - 12.3 FL ABSOLUTE NRBC 0.00 0.0 - 0.2 K/uL NEUTROPHILS 51 43 - 78 % LYMPHOCYTES 35 13 - 44 % MONOCYTES 11 4.0 - 12.0 % EOSINOPHILS 2 0.5 - 7.8 % BASOPHILS 1 0.0 - 2.0 % IMMATURE GRANULOCYTES 0 0.0 - 5.0 %  
 ABS. NEUTROPHILS 4.4 1.7 - 8.2 K/UL  
 ABS. LYMPHOCYTES 3.1 0.5 - 4.6 K/UL  
 ABS. MONOCYTES 1.0 0.1 - 1.3 K/UL  
 ABS. EOSINOPHILS 0.2 0.0 - 0.8 K/UL  
 ABS. BASOPHILS 0.1 0.0 - 0.2 K/UL  
 ABS. IMM.  GRANS. 0.0 0.0 - 0.5 K/UL  
 RBC COMMENTS NORMOCYTIC/NORMOCHROMIC    
 WBC COMMENTS Result Confirmed By Smear PLATELET COMMENTS ADEQUATE    
 DF AUTOMATED PROTHROMBIN TIME + INR Collection Time: 10/07/18  3:32 AM  
Result Value Ref Range Prothrombin time 29.4 (H) 11.5 - 14.5 sec INR 3.0    
PTT Collection Time: 10/07/18  3:32 AM  
Result Value Ref Range aPTT 69.3 (H) 23.2 - 35.3 SEC  
GLUCOSE, POC Collection Time: 10/07/18  6:01 AM  
Result Value Ref Range Glucose (POC) 92 65 - 100 mg/dL XRAY LEFT KNEE:  2 images are presented, soft tissue edema and irregularity is seen along the inferior patellar tendon anteriorly but there is no joint effusion seen and no bony injury identified. IMPRESSION: Soft tissue injury seen but no bony injury noted. If osteomyelitis is suspected MRI should be considered.  
  
Assessment/Plan:  
Cellulitis and Abscess left knee Preliminary Culture with Gram negative rods, final pending Begun on vancomycin and ceftriaxone after wound culture collected S/P I+D hematoma left knee after traumatic injury 9/11 with non healing wound since Surgery In 
                      Wound care consult Monitor for drainage and blood loss MDS (myelodysplastic syndrome) on procrit and Fe, transfusions OP Hematology following Monitor Hgb Extensive CAD with stent, MI, Cardiomyopathy, Chronic combined systolic and diastolic heart failure with EF: 25-30% Stable at present No IVF ICD S/P AVR, mechanical 
Hypertension Continue home meds Chronic anticoagulation on Coumadin with current INR therapeutic (2-3 goal), recent dose decrease to 2.5 mg daily Continue home dose Pharmacy to dose please Monitor INR daily CKD stage 3 Monitor daily COPD on home oxygen 3 liters RT consult Aerosols Continue oxygen 3 liters REJI on CPAP Family to bring CPAP 
NIDDM, diet controlled, A1C: 5.7 Routine SSI Pulmonary hypertension Physical debility Morbid obesity Chronic respiratory failure with hypoxia Continue oxygen and aerosols Diarrhea x 4 days C dif culture Stool for: Culture, Heme, WBC pending Elevated lipase: etiology unclear without history of pancreatitis Repeat in am  
 
Care Plan discussed with: Patient/Family and Nurse Signed By: Sadie Chen NP October 7, 2018

## 2018-10-07 NOTE — PROGRESS NOTES
Warfarin dosing per pharmacist 
 
Luz Elena Carter Mani Arevalo is a 77 y.o. female. Height: 5' 2\" (157.5 cm)    Weight: 94.8 kg (209 lb) Indication:  AVR Goal INR:  2-3 Home dose:  2.5 mg daily Risk factors or significant drug interactions:  N/A Other anticoagulants:  N/A Daily Monitoring Date  INR     Warfarin dose HGB              Notes 10/6  2.6  2.5 mg  10.9 
10/7  3  2 mg  9 The patient is followed by a warfarin clinic which she had a meeting with on 10/5 with an INR of 2.6. INR up to 3 today. Will reduce dose tonight to 2 mg and follow daily INR. Pharmacy will continue to follow. Please call with any questions. Thank you, Luis Smith, PharmD Clinical Pharmacist 
[phone]

## 2018-10-07 NOTE — CONSULTS
H&P/Consult Note/Progress Note/Office Note:  
Daphne Marshall  MRN: [id number]  WWF:[birth year]  Age:68 y.o. General Surgery Consult ordered by: Salinas Flores NP Reason for General Surgery Consult: Eval wound to Left knee - S/P I+D hematoma left knee after traumatic injury 9/11 with non healing wound since As previously noted HPI: Daphne Marshall is a 77 y.o. female \"with extensive medical history as noted below on chronic coumadin who initially presented to ER 9/7 after fall in bathroom 8/28 where she landed directly on her left knee with complaints of dizziness and fatigue and found to have Hgb of 6.7. She was transfused to Hgb of 9.6. She also was found with a large hematoma/bullae over her left knee and INR of 4.3. She underwent an I+D per Dr Annette Salas on 9/11 with a large amount of serosanguinous fluid evacuated. She has been followed by home health PT, OT and wound care since discharge from rehab on ninth floor 9/11-9/21. She has done well except the wound has not closed. Two days ago, Henrik Garvey RN and daughter who is an RN noted mild erythema and heat surrounding upper half of wound. Open area approx 5-6 cm in circumference. No purulent drainage, oozing small amounts of dark blood with slightly foul odor. Full ROM to knee. All distal circulation, motion and sensation WNL for patient. No distal edema, mild local edema. Multiple old scars from prior lower extremity wounds. Patient denies pain to area, fever, systemic symptoms. She does not appear septic and lactic acid is 1.8. WBC is 11.6 without shift. Wound cultured in ER prior to administration of antibiotics. Begun on vancomycin and ceftriaxone until cultures resulted. Large, attentive family at bedside. Creatinine 1.48 on admission with baseline of 1.2. Of note, patient reports diarrhea x 4 days without use of antibiotics, additional GI symptoms. \"   
 
 
10/7/18: Pt sitting up on side of the bed just finishing breakfast. No complaints. AF, NAD. WBC 8.8, Pt taking Coumadin 2.5mg qhs. INR 3.0. Past Medical History:  
Diagnosis Date  Anticoagulated on Coumadin 2013 S/P AVR  CAD (coronary artery disease) 2013 PCI LAD with stent placed  Cardiomyopathy ([facility]  CHF (congestive heart failure) ([facility] 2017  CKD (chronic kidney disease) stage 3, GFR 30-59 ml/min ([facility]) 7/10/2013  COPD (chronic obstructive pulmonary disease) ([facility] 2014  Essential hypertension, benign 2013  HLD (hyperlipidemia)  ICD (implantable cardioverter-defibrillator) in place 10/2/2014 Biotronik single-chamber ICD implantation 10/20/14  Iron deficiency anemia due to chronic blood loss 2009  MDS (myelodysplastic syndrome) ([facility] 2011 Procrit started in 11 Procrit weekly and Iron stores. 12 good response to 3 weekly procrit did not need it last time  3- Pt doing well. Just wanted a \"check-up. \" Responding to Procrit every three weeks. 13 patient has missed some injections on recently restarted hemoglobin only issue , takes oral iron iron stores each time  REJI (obstructive sleep apnea)-cpap 2014  Osteoarthritis  Osteopenia  Quadrantanopia  Visual complaint 2015 Past Surgical History:  
Procedure Laterality Date [address]    HX AORTIC VALVE REPLACEMENT  2005  
 mechanical valve   HX  SECTION    
 HX CORONARY STENT PLACEMENT  May, 2014 STEMI with Stent placement.  HX ENDOSCOPY  2009 EGD [address] CATHETERIZATION    HX PACEMAKER  10/2/2014 Biotronik ICD  HX TUBAL LIGATION    
 IR BX BONE MARROW DIAGNOSTIC  2011 Current Facility-Administered Medications Medication Dose Route Frequency  sodium chloride (NS) flush 5-10 mL  5-10 mL IntraVENous Q8H  
 sodium chloride (NS) flush 5-10 mL  5-10 mL IntraVENous PRN  
  acetaminophen (TYLENOL) tablet 650 mg  650 mg Oral Q4H PRN  
 traMADol (ULTRAM) tablet 50 mg  50 mg Oral Q6H PRN  
 traMADol (ULTRAM) tablet 100 mg  100 mg Oral Q6H PRN  
 tuberculin injection 5 Units  5 Units IntraDERMal ONCE  
 cefTRIAXone (ROCEPHIN) 2 g in 0.9% sodium chloride (MBP/ADV) 50 mL  2 g IntraVENous Q24H  
 insulin lispro (HUMALOG) injection   SubCUTAneous AC&HS  
 albuterol (PROVENTIL VENTOLIN) nebulizer solution 2.5 mg  2.5 mg Nebulization Q4H PRN  
 ascorbic acid (vitamin C) (VITAMIN C) tablet 500 mg  500 mg Oral DAILY  aspirin delayed-release tablet 81 mg  81 mg Oral DAILY  calcium carbonate (TUMS) chewable tablet 200 mg [elemental]  200 mg Oral BID  carvedilol (COREG) tablet 6.25 mg  6.25 mg Oral BID WITH MEALS  cholecalciferol (VITAMIN D3) tablet 2,000 Units  2,000 Units Oral DAILY  escitalopram oxalate (LEXAPRO) tablet 20 mg  20 mg Oral DAILY  ferrous gluconate 324 mg (38 mg iron) tablet 1 Tab  1 Tab Oral BID  fluticasone (FLONASE) 50 mcg/actuation nasal spray 2 Spray  2 Spray Both Nostrils DAILY PRN  
 furosemide (LASIX) tablet 40 mg  40 mg Oral DAILY  isosorbide mononitrate ER (IMDUR) tablet 30 mg  30 mg Oral DAILY  loratadine (CLARITIN) tablet 10 mg  10 mg Oral DAILY PRN  
 multivitamin, tx-iron-ca-min (THERA-M w/ IRON) tablet 1 Tab  1 Tab Oral DAILY  sacubitril-valsartan (ENTRESTO) 24-26 mg tablet 1 Tab  1 Tab Oral BID  simvastatin (ZOCOR) tablet 20 mg  20 mg Oral QHS  spironolactone (ALDACTONE) tablet 25 mg  25 mg Oral BID  traZODone (DESYREL) tablet 25-50 mg  25-50 mg Oral QHS  tiotropium (SPIRIVA) inhalation capsule 18 mcg  18 mcg Inhalation DAILY  warfarin (COUMADIN) tablet 2.5 mg  2.5 mg Oral QHS  budesonide (PULMICORT) 500 mcg/2 ml nebulizer suspension  500 mcg Nebulization BID RT  And  
 albuterol (PROVENTIL VENTOLIN) nebulizer solution 2.5 mg  2.5 mg Nebulization Q6HWA RT  
  influenza vaccine 2018-19 (6 mos+)(PF) (FLUARIX QUAD/FLULAVAL QUAD) injection 0.5 mL  0.5 mL IntraMUSCular PRIOR TO DISCHARGE  vancomycin (VANCOCIN) 1,000 mg in 0.9% sodium chloride (MBP/ADV) 250 mL  1,000 mg IntraVENous Q24H  
 alcohol 62% (NOZIN) nasal  1 Ampule  1 Ampule Topical Q12H Sulfa (sulfonamide antibiotics) and Aspirin Social History Social History  Marital status:  Spouse name: N/A  
 Number of children: N/A  
 Years of education: N/A Occupational History   Retired  
  deli Social History Main Topics  Smoking status: Former Smoker Packs/day: 0.25 Years: 42.00 Types: Cigarettes Start date: 10/1/1956 Quit date: 5/1/2014  Smokeless tobacco: Never Used  Alcohol use No  
 Drug use: No  
 Sexual activity: Not Asked Other Topics Concern  Weight Concern Yes  Special Diet Yes Social History Narrative Lives with her daughter. Ambulates only short distances. History Smoking Status  Former Smoker  Packs/day: 0.25  
 Years: 42.00  Types: Cigarettes  Start date: 10/1/1956  Quit date: 5/1/2014 Smokeless Tobacco  
 Never Used Family History Problem Relation Age of Onset  Heart Disease Mother CHF  Hypertension Mother  Kidney Disease Mother  Heart Disease Father CHF  Lung Disease Father  Diabetes Father  Cancer Father   
  prostate  Hypertension Father  Heart Attack Father  Heart Disease Maternal Aunt  Diabetes Brother  Coronary Artery Disease Other  Breast Cancer Neg Hx   
 
ROS: The patient has no difficulty with chest pain or shortness of breath. No fever or chills. Comprehensive review of systems was otherwise unremarkable except as noted above. Physical Exam:  
Visit Vitals  /58 (BP 1 Location: Left arm, BP Patient Position: Sitting)  Pulse 72  Temp 97.5 °F (36.4 °C)  Resp 18  Ht 5' 2\" (1.575 m)  Wt 209 lb (94.8 kg)  SpO2 93%  BMI 38.23 kg/m2 Vitals:  
 10/07/18 0571 10/07/18 3685 10/07/18 9522 10/07/18 1849 BP: 112/71  108/58 Pulse: 67  (!) 48 72 Resp: 18  18 Temp: 98.3 °F (36.8 °C)  97.5 °F (36.4 °C) SpO2: 99% 96% 93% Weight:      
Height:      
 
  
10/05 1901 - 10/07 0700 In: -  
Out: 502 [XJQBW:150] Constitutional: Obese, Alert, cooperative, no acute distress; appears stated age Eyes:Sclera are clear. EOMs intact ENMT: no external lesions gross hearing normal; no obvious neck masses, no ear or lip lesions, nares normal 
CV: RRR. Normal perfusion Resp: No JVD. Breathing is  non-labored; no audible wheezing. GI: soft, non-tender, non-distended Integument: Open area to Left knee approx 5-6 cm in circumference with an area of necrotic tissue noted to center of wound. No purulent drainage. Small amount of bloody drainage on guaze. Musculoskeletal: No embolic signs or cyanosis. Neuro:  Oriented; moves all 4; no focal deficits Psychiatric: normal affect and mood, no memory impairment Recent vitals (if inpt): 
Patient Vitals for the past 24 hrs: 
 BP Temp Pulse Resp SpO2 Height Weight 10/07/18 0838 - - 72 - - - -  
10/07/18 0754 108/58 97.5 °F (36.4 °C) (!) 48 18 93 % - -  
10/07/18 0736 - - - - 96 % - -  
10/07/18 0415 112/71 98.3 °F (36.8 °C) 67 18 99 % - -  
10/06/18 2354 92/55 - - - - - -  
10/06/18 2353 (!) 84/53 98.1 °F (36.7 °C) 69 18 95 % - -  
10/06/18 2247 - - - - 97 % - -  
10/06/18 2139 - - - - 98 % - -  
10/06/18 1930 101/62 98.5 °F (36.9 °C) 74 18 97 % - -  
10/06/18 1720 143/79 99.2 °F (37.3 °C) 76 18 98 % - -  
10/06/18 1706 128/62 - - - 95 % - -  
10/06/18 1616 117/65 - - - 97 % - -  
10/06/18 1520 123/73 97.4 °F (36.3 °C) 81 18 94 % 5' 2\" (1.575 m) 209 lb (94.8 kg) Labs: 
Recent Labs 10/07/18 
 0815  10/06/18 
 1525 WBC  8.8  11.6* HGB  9.0*  10.9* PLT  199  232 NA  142  139  
K  4.4  4.6 CL  100  97* CO2  39*  39* BUN  38*  38* CREA  1.50*  1.48* GLU  58*  119* PTP  29.4*  26.8* INR  3.0  2.6 APTT  69.3*  55.2* TBILI   --   0.2 SGOT   --   13* ALT   --   13  
AP   --   63  
LPSE   --   620* Lab Results Component Value Date/Time WBC 8.8 10/07/2018 03:32 AM  
 HGB 9.0 (L) 10/07/2018 03:32 AM  
 PLATELET 295 95/17/1207 03:32 AM  
 Sodium 142 10/07/2018 03:32 AM  
 Potassium 4.4 10/07/2018 03:32 AM  
 Chloride 100 10/07/2018 03:32 AM  
 CO2 39 (H) 10/07/2018 03:32 AM  
 BUN 38 (H) 10/07/2018 03:32 AM  
 Creatinine 1.50 (H) 10/07/2018 03:32 AM  
 Glucose 58 (L) 10/07/2018 03:32 AM  
 INR 3.0 10/07/2018 03:32 AM  
 aPTT 69.3 (H) 10/07/2018 03:32 AM  
 Bilirubin, total 0.2 10/06/2018 03:25 PM  
 AST (SGOT) 13 (L) 10/06/2018 03:25 PM  
 ALT (SGPT) 13 10/06/2018 03:25 PM  
 Alk. phosphatase 63 10/06/2018 03:25 PM  
 Amylase 88 01/31/2012 03:12 PM  
 Lipase 620 (H) 10/06/2018 03:25 PM  
 Lactic acid 0.7 02/16/2016 06:16 AM  
 Troponin-I <0.05 01/31/2012 03:32 PM  
 Troponin-I, Qt. 0.08 (H) 12/01/2016 02:20 AM  
 
 
10/6/18: Two-view left knee x-ray 
  
Reference exam: September 7, 2018 
  
INDICATION: Diabetic with open wound 
  
FINDINGS: 2 images are presented, soft tissue edema and irregularity is seen 
along the inferior patellar tendon anteriorly but there is no joint effusion 
seen and no bony injury identified. 
  
IMPRESSION: Soft tissue injury seen but no bony injury noted. If osteomyelitis 
is suspected MRI should be considered. I reviewed recent labs and recent radiologic studies. I independently reviewed radiology images for studies I described above or studies I have ordered. Admission date (for inpatients): 10/6/2018 * No surgery found *  * No surgery found * ASSESSMENT/PLAN: 
Problem List  Date Reviewed: 10/6/2018 Codes Class Noted * (Principal)Abscess ICD-10-CM: L02.91 
ICD-9-CM: 682.9  10/6/2018  History of incision and drainage ICD-10-CM: Z98.890 
 ICD-9-CM: V45.89  10/6/2018 Traumatic hematoma of left knee (Chronic) ICD-10-CM: N82.99RQ ICD-9-CM: 924.11  9/8/2018 Debility ICD-10-CM: R53.81 ICD-9-CM: 799.3  9/8/2018 Anemia (Chronic) ICD-10-CM: D64.9 ICD-9-CM: 285.9  9/7/2018 Chronic respiratory failure with hypoxia (HCC) (Chronic) ICD-10-CM: J96.11 
ICD-9-CM: 518.83, 799.02  9/7/2018 Encounter for immunization ICD-10-CM: O63 ICD-9-CM: V03.89  8/21/2018 Obesity, morbid ([facility] (Chronic) ICD-10-CM: E66.01 
ICD-9-CM: 278.01  6/8/2018 Physical debility (Chronic) ICD-10-CM: R53.81 ICD-9-CM: 799.3  5/2/2018 Closed nondisplaced fracture of shaft of fifth metacarpal bone of left hand ICD-10-CM: Z93.227X ICD-9-CM: 815.03  4/28/2018 Subdural hematoma ([facility] ICD-10-CM: E17.8C5D 
ICD-9-CM: 432.1  4/27/2018 Long term (current) use of anticoagulants (Chronic) ICD-10-CM: Z79.01 
ICD-9-CM: V58.61  4/19/2018 Dysthymia ICD-10-CM: F34.1 ICD-9-CM: 300.4  4/5/2018 Type 2 diabetes mellitus with nephropathy (HCC) (Chronic) ICD-10-CM: E11.21 
ICD-9-CM: 250.40, 583.81  12/18/2017 Pulmonary hypertension (HCC) (Chronic) ICD-10-CM: I27.20 ICD-9-CM: 416.8  6/15/2016 S/P AVR (aortic valve replacement) (Chronic) ICD-10-CM: Z95.2 ICD-9-CM: V43.3  Unknown Overview Signed 2/23/2016 11:04 AM by Nils Caballero Mechanical/Artific Cardiomyopathy ([facility] (Chronic) ICD-10-CM: I42.9 ICD-9-CM: 425.4  Unknown Osteopenia ICD-10-CM: M85.80 ICD-9-CM: 733.90  Unknown HLD (hyperlipidemia) (Chronic) ICD-10-CM: K25.3 ICD-9-CM: 272.4  Unknown Osteoarthritis ICD-10-CM: M19.90 ICD-9-CM: 715.90  Unknown  
   
 ICD (implantable cardioverter-defibrillator) in place ICD-10-CM: Z95.810 ICD-9-CM: V45.02  10/2/2014 Overview Signed 10/2/2014  4:58 PM by Mark Siemens III Biotronik single-chamber ICD implantation 10/20/14 Diabetes mellitus type 2, diet-controlled (HCC) (Chronic) ICD-10-CM: E11.9 ICD-9-CM: 250.00  8/28/2014 Overview Signed 10/6/2018  8:56 PM by Yaneth Collins NP  
  A1C: 5.7 COPD (chronic obstructive pulmonary disease) (HCC) (Chronic) ICD-10-CM: J44.9 ICD-9-CM: 836  4/2/2014 Overview Signed 10/6/2018  8:56 PM by Yaneth Collins NP  
  HOME OXYGEN 3 LITERS 
  
  
   
 REJI (obstructive sleep apnea)-cpap (Chronic) ICD-10-CM: C73.90 
ICD-9-CM: 327.23  4/2/2014 CKD (chronic kidney disease) stage 3, GFR 30-59 ml/min (HCC) (Chronic) ICD-10-CM: N18.3 ICD-9-CM: 585.3  7/10/2013 Anticoagulated on Coumadin (Chronic) ICD-10-CM: Z51.81, Z79.01 
ICD-9-CM: V58.83, V58.61  7/9/2013 Overview Signed 7/9/2013  4:16 PM by Manny Luna NP  
  S/P AVR 
  
  
   
 CAD (coronary artery disease) (Chronic) ICD-10-CM: I25.10 ICD-9-CM: 414.00  1/20/2013 Overview Signed 5/20/2014 10:05 AM by Sheryl Greene NP  
  5/8/14 PCI LAD with stent placed Chronic combined systolic and diastolic heart failure (HCC) (Chronic) ICD-10-CM: I50.42 
ICD-9-CM: 428.42  1/20/2013 Overview Addendum 4/28/2018  4:53 AM by Nan Katz MD  
  5/8/14 ECHO:  EF 10-15% 12/2017:  EF 25-30% Essential hypertension, benign (Chronic) ICD-10-CM: I10 
ICD-9-CM: 401.1  1/20/2013 MDS (myelodysplastic syndrome) (HCC) (Chronic) ICD-10-CM: D46.9 ICD-9-CM: 238.75  12/17/2011 Overview Addendum 8/29/2013 11:06 AM by Meg Groves started in August, 2011 12/18/11 Procrit weekly and Iron stores. 5-12 12-13-12 good response to 3 weekly procrit did not need it last time 3-7-13 Pt doing well. Just wanted a \"check-up. \" Responding to Procrit every three weeks. 8-29-13 patient has missed some injections on recently restarted hemoglobin only issue , takes oral iron iron stores each time Iron deficiency anemia due to chronic blood loss (Chronic) ICD-10-CM: D50.0 ICD-9-CM: 280.0  7/29/2009 Principal Problem: 
  Abscess (10/6/2018) Active Problems: 
  MDS (myelodysplastic syndrome) ([address] (12/17/2011) Overview: Procrit started in August, 2011 12/18/11 Procrit weekly and Iron stores. 5-12 12-13-12 good response to 3 weekly procrit did not need it last time 3-7-13 Pt doing well. Just wanted a \"check-up. \" Responding to Procrit  
    every three weeks. 8-29-13 patient has missed some injections on recently restarted  
    hemoglobin only issue , takes oral iron iron stores each time CAD (coronary artery disease) (1/20/2013) Overview: 5/8/14 PCI LAD with stent placed Chronic combined systolic and diastolic heart failure ([address] (1/20/2013) Overview: 5/8/14 ECHO:  EF 10-15% 12/2017:  EF 25-30% Essential hypertension, benign (1/20/2013) Anticoagulated on Coumadin (7/9/2013) Overview: S/P AVR 
 
  CKD (chronic kidney disease) stage 3, GFR 30-59 ml/min ([facility]) (7/10/2013) COPD (chronic obstructive pulmonary disease) ([address] (4/2/2014) Overview: HOME OXYGEN 3 LITERS 
 
  REJI (obstructive sleep apnea)-cpap (4/2/2014) Diabetes mellitus type 2, diet-controlled ([address] (8/28/2014) Overview: A1C: 5.7 ICD (implantable cardioverter-defibrillator) in place (10/2/2014) Overview: Biotronik single-chamber ICD implantation 10/20/14 S/P AVR (aortic valve replacement) () Overview: Mechanical/Artific Cardiomyopathy ([address] () Pulmonary hypertension ([address] (6/15/2016) Physical debility (5/2/2018) Obesity, morbid ([address] (6/8/2018) Anemia (9/7/2018) Chronic respiratory failure with hypoxia ([address] (9/7/2018) Traumatic hematoma of left knee (9/8/2018) History of incision and drainage (10/6/2018) Plan: 
Care Management per Hospitalist 
 IV Abx - Vancomycin and Rocephin Wound RN has been consulted Wound culture pending INR 3 today, Pharmacy consulted for dosing with goal INR 2 to 3, followup daily INR Further orders pending eval by Dr. Kemi San Signed:  OMARI Gomez-BC I have seen and examined the patient with the Nurse Practitioner and agree with the above assessment and plan. Debrided at bedside. Get [place].  Kemi San MD

## 2018-10-07 NOTE — PROGRESS NOTES
PT note: Evaluation received and chart reviewed. Spoke with family at bedside who kindly ask therapist to hold evaluation and mobility today, stating pt did not rest well last night and is exhausted today. Will check back tomorrow.   
 
Alan Painter, MKT

## 2018-10-07 NOTE — H&P
Hospitalist Admission History and Physical  
 
 
Chief Complaint Patient presents with  Wound Check Subjective:  
 
Patient is a 77 y.o.  female with extensive medical history as noted below on chronic coumadin who initially presented to ER 9/7 after fall in bathroom 8/28 where she landed directly on her left knee with complaints of dizziness and fatigue and found to  Have Hgb of 6.7. She was transfused to Hgb of 9.6. She also was found with a large hematoma/bullae over her left knee and INR of 4.3. She underwent an I+D per Dr Samira Moran on 9/11 with a large amount of serosanguinous fluid evacuated. She has been followed by home health PT, OT and wound care since discharge from rehab on ninth floor 9/11-9/21. She has done well except the wound has not closed. Two days ago, [facility] RN and daughter who is an RN noted mild erythema and heat surrounding upper half of wound. Open area approx 5-6 cm in circumference. No purulent drainage, oozing small amounts of dark blood with slightly foul odor. Full ROM to knee. All distal circulation, motion and sensation WNL for patient. No distal edema, mild local edema. Multiple old scars from prior lower extremity wounds. Patient denies pain to area, fever, systemic symptoms. She does not appear septic and lactic acid is 1.8. WBC is 11.6 without shift. Wound cultured by me in ER prior to administration of antibiotics. Begun on vancomycin and ceftriaxone until cultures resulted. Large, attentive family at bedside. Creatinine 1.48 on admission with baseline of 1.2. Of note, patient reports diarrhea x 4 days without use of antibiotics, additional GI symptoms. Dr Xin Rahman follows patient for her coumadin dosing, was cut from 5 mg daily to 2.5 mg daily on 9/25 with INR of 3.9 (target: 2-3). She also has MDS syndrome, sees Hematology and receives regular blood transfusions and procrit. Past Medical History:  
Diagnosis Date  Anticoagulated on Coumadin 2013 S/P AVR  CAD (coronary artery disease) 2013 PCI LAD with stent placed  Cardiomyopathy ([facility]  CHF (congestive heart failure) ([facility] [address] 2017  CKD (chronic kidney disease) stage 3, GFR 30-59 ml/min ([facility]) 7/10/2013  COPD (chronic obstructive pulmonary disease) ([facility] [address] 2014  Essential hypertension, benign 2013  HLD (hyperlipidemia)  ICD (implantable cardioverter-defibrillator) in place 10/2/2014 Biotronik single-chamber ICD implantation 10/20/14  Iron deficiency anemia due to chronic blood loss 2009  MDS (myelodysplastic syndrome) ([facility] [address] 2011 Procrit started in 11 Procrit weekly and Iron stores. 12 good response to 3 weekly procrit did not need it last time  3- Pt doing well. Just wanted a \"check-up. \" Responding to Procrit every three weeks. 13 patient has missed some injections on recently restarted hemoglobin only issue , takes oral iron iron stores each time  REJI (obstructive sleep apnea)-cpap 2014  Osteoarthritis  Osteopenia  Quadrantanopia  Visual complaint 2015 Past Surgical History:  
Procedure Laterality Date [address]    HX AORTIC VALVE REPLACEMENT  ,   
 mechanical valve   HX  SECTION    
 HX CORONARY STENT PLACEMENT  May, 2014 STEMI with Stent placement.  HX ENDOSCOPY  2009 EGD [address] CATHETERIZATION    HX PACEMAKER  10/2/2014 Bi[employer] ICD  HX TUBAL LIGATION    
 IR BX BONE MARROW DIAGNOSTIC  2011 Family History Problem Relation Age of Onset  Heart Disease Mother CHF  Hypertension Mother  Kidney Disease Mother  Heart Disease Father CHF  Lung Disease Father  Diabetes Father  Cancer Father   
  prostate  Hypertension Father  Heart Attack Father  Heart Disease Maternal Aunt  Diabetes Brother  Coronary Artery Disease Other  Breast Cancer Neg Hx Social History Social History Narrative Lives with her daughter. Ambulates only short distances. History Drug Use No  
 
 
Allergies Allergen Reactions  Sulfa (Sulfonamide Antibiotics) Hives  Aspirin Nausea Only Cannot take uncoated aspirin Prior to Admission medications Medication Sig Start Date End Date Taking? Authorizing Provider  
warfarin (COUMADIN) 2.5 mg tablet Take 2.5 mg by mouth nightly. Yes Historical Provider  
docusate sodium (COLACE) 50 mg capsule Take 50 mg by mouth daily. Yes Historical Provider  
carvedilol (COREG) 6.25 mg tablet Take 1 Tab by mouth two (2) times daily (with meals). Patient taking differently: Take 3.125 mg by mouth two (2) times daily (with meals). 9/20/18  Yes Felisha Hagen MD  
furosemide (LASIX) 40 mg tablet Take 1 Tab by mouth daily. Patient taking differently: Take 20 mg by mouth two (2) times a day. 9/20/18  Yes Felisha Hagen MD  
traZODone (DESYREL) 50 mg tablet Take 0.5-1 Tabs by mouth nightly. 9/20/18  Yes Felisha Hagen MD  
spironolactone (ALDACTONE) 25 mg tablet Take 1 Tab by mouth two (2) times a day. Pill bottles states 3.5 daily but patient is taking one in am and one in pm 8/21/18  Yes Monika Murillo MD  
simvastatin (ZOCOR) 20 mg tablet TAKE 1 TABLET BY MOUTH EVERY DAY 8/21/18  Yes Monika Murillo MD  
traMADol (ULTRAM) 50 mg tablet TAKE 1 TABLET BY MOUTH EVERY 4 HOURS AS NEEDED Patient taking differently: Take 50 mg by mouth every six (6) hours as needed for Pain. 8/21/18  Yes Monika Murillo MD  
escitalopram oxalate (LEXAPRO) 20 mg tablet TAKE 1 TAB BY MOUTH DAILY. INDICATIONS: ANXIETY WITH DEPRESSION 8/21/18  Yes Monika Murillo MD  
multivit-minerals/folic acid (ADULT ONE DAILY MULTIVITAMIN PO) Take 1 Tab by mouth daily. Yes Historical Provider ascorbic acid, vitamin C, (VITAMIN C) 500 mg tablet Take 500 mg by mouth daily. Yes Historical Provider  
aspirin delayed-release 81 mg tablet Take 81 mg by mouth daily. Yes Historical Provider  
ferrous gluconate 324 mg (38 mg iron) tablet TAKE 1 TAB BY MOUTH DAILY (BEFORE BREAKFAST). INDICATIONS: IRON DEFICIENCY ANEMIA Patient taking differently: 325 mg two (2) times daily (with meals). TAKE 1 TAB BY MOUTH DAILY (BEFORE BREAKFAST). INDICATIONS: IRON DEFICIENCY ANEMIA Takes twice a day 4/5/18  Yes Earl Milton MD  
fluticasone (FLONASE) 50 mcg/actuation nasal spray 2 Sprays by Both Nostrils route daily as needed. 3/7/18  Yes Earl Milton MD  
loratadine (CLARITIN) 10 mg tablet TAKE 1 TAB BY MOUTH DAILY. Patient taking differently: TAKE 1 TAB BY MOUTH DAILY PRN 1/2/18  Yes CAROLINA Hook  
mometasone-formoterol (DULERA) 200-5 mcg/actuation HFA inhaler 2 puffs bid, rinse mouth after use. Patient taking differently: Take 2 Puffs by inhalation two (2) times daily as needed. 8/30/17  Yes Sarah Beth Dumont NP  
albuterol (PROVENTIL VENTOLIN) 2.5 mg /3 mL (0.083 %) nebulizer solution 3 mL by Nebulization route every four (4) hours as needed for Wheezing. 8/30/17  Yes Sarah Beth Dumont NP  
albuterol (VENTOLIN HFA) 90 mcg/actuation inhaler 2 puffs qid prn Patient taking differently: Take 2 Puffs by inhalation every six (6) hours as needed for Wheezing or Shortness of Breath. 8/30/17  Yes Sarah Beth Dumont NP  
cholecalciferol, VITAMIN D3, (VITAMIN D3) 5,000 unit tab tablet Take 1 Tab by mouth daily. Patient taking differently: Take 2,000 Units by mouth daily. 6/15/16  Yes Earl Milton MD  
OXYGEN-AIR DELIVERY SYSTEMS 3 L by Nasal route continuous. Yes Historical Provider  
sacubitril-valsartan (ENTRESTO) 24 mg/26 mg tablet Take 1 Tab by mouth two (2) times a day. INDICATIONS: CHRONIC HEART FAILURE Patient taking differently: Take 1 Tab by mouth daily.  INDICATIONS: CHRONIC HEART FAILURE 7/23/18   Anurag Miller MD  
acetaminophen (TYLENOL) 325 mg tablet Take 650 mg by mouth every four (4) hours as needed for Pain. Historical Provider  
nitroglycerin (NITROSTAT) 0.4 mg SL tablet 0.4 mg by SubLINGual route every five (5) minutes as needed for Chest Pain. Historical Provider PHP:  Tracey Tinoco MD 
Hematology:  Dr Alok Waters Cardiology:  [facility] Review of Systems A comprehensive review of systems was negative except for that written in the HPI. Objective:  
 
Visit Vitals  /62  Pulse 74  Temp 98.5 °F (36.9 °C)  Resp 18  Ht 5' 2\" (1.575 m)  Wt 94.8 kg (209 lb)  SpO2 97%  BMI 38.23 kg/m2 Data Review:  
Recent Results (from the past 24 hour(s)) CBC WITH AUTOMATED DIFF Collection Time: 10/06/18  3:25 PM  
Result Value Ref Range WBC 11.6 (H) 4.3 - 11.1 K/uL  
 RBC 3.75 (L) 4.05 - 5.2 M/uL  
 HGB 10.9 (L) 11.7 - 15.4 g/dL HCT 35.5 (L) 35.8 - 46.3 % MCV 94.7 79.6 - 97.8 FL  
 MCH 29.1 26.1 - 32.9 PG  
 MCHC 30.7 (L) 31.4 - 35.0 g/dL  
 RDW 14.5 % PLATELET 301 710 - 560 K/uL MPV 10.1 9.4 - 12.3 FL ABSOLUTE NRBC 0.00 0.0 - 0.2 K/uL  
 DF AUTOMATED NEUTROPHILS 67 43 - 78 % LYMPHOCYTES 23 13 - 44 % MONOCYTES 9 4.0 - 12.0 % EOSINOPHILS 1 0.5 - 7.8 % BASOPHILS 0 0.0 - 2.0 % IMMATURE GRANULOCYTES 0 0.0 - 5.0 %  
 ABS. NEUTROPHILS 7.7 1.7 - 8.2 K/UL  
 ABS. LYMPHOCYTES 2.7 0.5 - 4.6 K/UL  
 ABS. MONOCYTES 1.0 0.1 - 1.3 K/UL  
 ABS. EOSINOPHILS 0.1 0.0 - 0.8 K/UL  
 ABS. BASOPHILS 0.0 0.0 - 0.2 K/UL  
 ABS. IMM. GRANS. 0.0 0.0 - 0.5 K/UL METABOLIC PANEL, COMPREHENSIVE Collection Time: 10/06/18  3:25 PM  
Result Value Ref Range Sodium 139 136 - 145 mmol/L Potassium 4.6 3.5 - 5.1 mmol/L Chloride 97 (L) 98 - 107 mmol/L  
 CO2 39 (H) 21 - 32 mmol/L Anion gap 3 (L) 7 - 16 mmol/L Glucose 119 (H) 65 - 100 mg/dL BUN 38 (H) 8 - 23 MG/DL  Creatinine 1.48 (H) 0.6 - 1.0 MG/DL  
 GFR est AA 45 (L) >60 ml/min/1.73m2 GFR est non-AA 37 (L) >60 ml/min/1.73m2 Calcium 10.0 8.3 - 10.4 MG/DL Bilirubin, total 0.2 0.2 - 1.1 MG/DL  
 ALT (SGPT) 13 12 - 65 U/L  
 AST (SGOT) 13 (L) 15 - 37 U/L Alk. phosphatase 63 50 - 136 U/L Protein, total 8.1 6.3 - 8.2 g/dL Albumin 3.5 3.2 - 4.6 g/dL Globulin 4.6 (H) 2.3 - 3.5 g/dL A-G Ratio 0.8 (L) 1.2 - 3.5 LIPASE Collection Time: 10/06/18  3:25 PM  
Result Value Ref Range Lipase 620 (H) 73 - 393 U/L  
PROTHROMBIN TIME + INR Collection Time: 10/06/18  3:25 PM  
Result Value Ref Range Prothrombin time 26.8 (H) 11.5 - 14.5 sec INR 2.6 PTT Collection Time: 10/06/18  3:25 PM  
Result Value Ref Range aPTT 55.2 (H) 23.2 - 35.3 SEC POC LACTIC ACID Collection Time: 10/06/18  3:33 PM  
Result Value Ref Range Lactic Acid (POC) 1.8 0.5 - 1.9 mmol/L  
GLUCOSE, POC Collection Time: 10/06/18  8:35 PM  
Result Value Ref Range Glucose (POC) 270 (H) 65 - 100 mg/dL XRAY LEFT KNEE:  2 images are presented, soft tissue edema and irregularity is seen along the inferior patellar tendon anteriorly but there is no joint effusion seen and no bony injury identified. IMPRESSION: Soft tissue injury seen but no bony injury noted. If osteomyelitis is suspected MRI should be considered. Old records reviewed. PHYSICAL EXAM: 
General appearance: Oriented and alert, cooperative, very pleasant, denies pain. Morbid obesity. Head: Normocephalic, without obvious abnormality, atraumatic Eyes: conjunctivae/corneas clear. PERRL, EOM's intact. Fundi benign Neck: supple, symmetrical, trachea midline, no JVD Lungs: clear to auscultation bilaterally Heart: regular rate and rhythm, S1, S2 normal, no murmur, click, rub or gallop Abdomen: soft, non-tender. Bowel sounds normal. No masses,  no organomegaly Extremities: See above for left knee exam.  All other extremities normal, atraumatic, no cyanosis or edema Skin: Skin color, texture, turgor normal. No rashes or lesions Neurologic: Grossly normal 
 
Assessment/Plan:  
Cellulitis and Abscess left knee Culture pending Begun on vancomycin and ceftriaxone after wound culture collected S/P I+D hematoma left knee after traumatic injury 9/11 with non healing wound since Consult Surgery Wound care consult Monitor for drainage and blood loss MDS (myelodysplastic syndrome) on procrit and Fe, transfusions OP Hematology following Monitor Hgb Extensive CAD with stent, MI, Cardiomyopathy, Chronic combined systolic and diastolic heart failure with EF: 25-30% Stable at present No IVF ICD S/P AVR, mechanical 
Hypertension Continue home meds Chronic anticoagulation on Coumadin with current INR therapeutic (2-3 goal), recent dose decrease to 2.5 mg daily Continue home dose Pharmacy to dose please Monitor INR daily CKD stage 3 Monitor daily COPD on home oxygen 3 liters RT consult Aerosols Continue oxygen 3 liters REJI on CPAP Family to bring CPAP 
NIDDM, diet controlled, A1C: 5.7 Routine SSI Pulmonary hypertension Physical debility Morbid obesity Chronic respiratory failure with hypoxia Continue oxygen and aerosols Diarrhea x 4 days C dif culture Stool for: Culture, Heme, WBC pending Signed By: Hong Vincent NP October 6, 2018

## 2018-10-07 NOTE — PROGRESS NOTES
END OF SHIFT NOTE: 
 
INTAKE/OUTPUT 
10/06 0701 - 10/07 0700 In: -  
Out: 982 [[id number]] Voiding: YES Catheter: NO 
Drain:   
 
 
 
 
 
Flatus: Patient does not have flatus present. Stool:  0 occurrences. Characteristics: 
  
 
Emesis: 0 occurrences. Characteristics: VITAL SIGNS Patient Vitals for the past 12 hrs: 
 Temp Pulse Resp BP SpO2  
10/07/18 0415 98.3 °F (36.8 °C) 67 18 112/71 99 % 10/06/18 2353 98.1 °F (36.7 °C) 69 18 (!) 84/53 95 % 10/06/18 2247 - - - - 97 % 10/06/18 2139 - - - - 98 % 10/06/18 1930 98.5 °F (36.9 °C) 74 18 101/62 97 % 10/06/18 1720 99.2 °F (37.3 °C) 76 18 143/79 98 % Pain Assessment Pain Intensity 1: 0 (10/06/18 1720) Patient Stated Pain Goal: 0 Ambulating Yes with help to BR 
BP up from 41-49 systolic to 152 systolic. . Dry drsg to left knee. Shift report given to oncoming nurse at the bedside.  
 
Alvin Balderas RN

## 2018-10-07 NOTE — PROGRESS NOTES
Pharmacokinetic Consult to Pharmacist 
 
Vianca Oconnell Cindy is a 77 y.o. female being treated for Left Knee Cellulitis/Abscess with ceftriaxone and vancomycin. Height: 5' 2\" (157.5 cm)  Weight: 94.8 kg (209 lb) Lab Results Component Value Date/Time BUN 38 (H) 10/06/2018 03:25 PM  
 Creatinine 1.48 (H) 10/06/2018 03:25 PM  
 WBC 11.6 (H) 10/06/2018 03:25 PM  
 Procalcitonin <0.1 04/14/2014 02:40 PM  
 Lactic acid 0.7 02/16/2016 06:16 AM  
 Lactic Acid (POC) 1.8 10/06/2018 03:33 PM  
  
Estimated Creatinine Clearance: 38 mL/min (based on Cr of 1.48). CULTURES: 
All Micro Results Procedure Component Value Units Date/Time Abner Daley [[id number]] Order Status:  Sent Specimen:  Stool C. DIFFICILE AG & TOXIN A/B [[id number]] Order Status:  Sent Specimen:  Stool Sobeida Live STAIN [[id number]] Collected:  10/06/18 1647 Order Status:  Completed Specimen:  Wound from Knee  Updated:  10/06/18 1737 Day 1 of vancomycin. Goal trough is 10-20. Vancomycin dose initiated at 2000 mg x 1 then 1000 mg Q 24 hrs. The calculated trough is 16.4 and a trough will be drawn prior to the 3rd dose. Will continue to follow patient. Thank you, RAJESH! Surendra, Pharm D, [facility] Clinical Pharmacist 
 
 
 
 Breath sounds clear and equal bilaterally.

## 2018-10-08 ENCOUNTER — HOME CARE VISIT (OUTPATIENT)
Dept: HOME HEALTH SERVICES | Facility: HOME HEALTH | Age: 68
End: 2018-10-08
Payer: MEDICARE

## 2018-10-08 PROBLEM — S81.802A OPEN WOUND OF LEFT LOWER EXTREMITY WITH COMPLICATION: Status: ACTIVE | Noted: 2018-10-07

## 2018-10-08 LAB
ALBUMIN SERPL-MCNC: 2.8 G/DL (ref 3.2–4.6)
ALBUMIN/GLOB SERPL: 0.7 {RATIO} (ref 1.2–3.5)
ALP SERPL-CCNC: 47 U/L (ref 50–136)
ALT SERPL-CCNC: 11 U/L (ref 12–65)
ANION GAP SERPL CALC-SCNC: 4 MMOL/L (ref 7–16)
APPEARANCE UR: NORMAL
APTT PPP: 55 SEC (ref 23.2–35.3)
AST SERPL-CCNC: 11 U/L (ref 15–37)
BILIRUB SERPL-MCNC: 0.4 MG/DL (ref 0.2–1.1)
BILIRUB UR QL: NEGATIVE
BUN SERPL-MCNC: 38 MG/DL (ref 8–23)
CALCIUM SERPL-MCNC: 9.4 MG/DL (ref 8.3–10.4)
CHLORIDE SERPL-SCNC: 100 MMOL/L (ref 98–107)
CO2 SERPL-SCNC: 36 MMOL/L (ref 21–32)
COLOR UR: YELLOW
CREAT SERPL-MCNC: 1.81 MG/DL (ref 0.6–1)
ERYTHROCYTE [DISTWIDTH] IN BLOOD BY AUTOMATED COUNT: 14.6 %
GLOBULIN SER CALC-MCNC: 4 G/DL (ref 2.3–3.5)
GLUCOSE BLD STRIP.AUTO-MCNC: 105 MG/DL (ref 65–100)
GLUCOSE BLD STRIP.AUTO-MCNC: 137 MG/DL (ref 65–100)
GLUCOSE BLD STRIP.AUTO-MCNC: 171 MG/DL (ref 65–100)
GLUCOSE BLD STRIP.AUTO-MCNC: 186 MG/DL (ref 65–100)
GLUCOSE SERPL-MCNC: 97 MG/DL (ref 65–100)
GLUCOSE UR STRIP.AUTO-MCNC: NEGATIVE MG/DL
HCT VFR BLD AUTO: 31.5 % (ref 35.8–46.3)
HGB BLD-MCNC: 9.5 G/DL (ref 11.7–15.4)
HGB UR QL STRIP: NEGATIVE
INR PPP: 2.5
KETONES UR QL STRIP.AUTO: NEGATIVE MG/DL
LEUKOCYTE ESTERASE UR QL STRIP.AUTO: NEGATIVE
LIPASE SERPL-CCNC: 157 U/L (ref 73–393)
MAGNESIUM SERPL-MCNC: 2 MG/DL (ref 1.8–2.4)
MCH RBC QN AUTO: 28.6 PG (ref 26.1–32.9)
MCHC RBC AUTO-ENTMCNC: 30.2 G/DL (ref 31.4–35)
MCV RBC AUTO: 94.9 FL (ref 79.6–97.8)
MM INDURATION POC: 0 MM (ref 0–5)
MM INDURATION POC: NORMAL MM (ref 0–5)
NITRITE UR QL STRIP.AUTO: NEGATIVE
NRBC # BLD: 0 K/UL (ref 0–0.2)
PH UR STRIP: 5.5 [PH] (ref 5–9)
PLATELET # BLD AUTO: 187 K/UL (ref 150–450)
PMV BLD AUTO: 10.2 FL (ref 9.4–12.3)
POTASSIUM SERPL-SCNC: 4.2 MMOL/L (ref 3.5–5.1)
PPD POC: NEGATIVE NEGATIVE
PPD POC: NORMAL NEGATIVE
PROT SERPL-MCNC: 6.8 G/DL (ref 6.3–8.2)
PROT UR STRIP-MCNC: NEGATIVE MG/DL
PROTHROMBIN TIME: 25.8 SEC (ref 11.5–14.5)
RBC # BLD AUTO: 3.32 M/UL (ref 4.05–5.2)
SODIUM SERPL-SCNC: 140 MMOL/L (ref 136–145)
SP GR UR REFRACTOMETRY: 1.01 (ref 1–1.02)
UROBILINOGEN UR QL STRIP.AUTO: 0.2 EU/DL (ref 0.2–1)
WBC # BLD AUTO: 7.7 K/UL (ref 4.3–11.1)

## 2018-10-08 PROCEDURE — 3331090002 HH PPS REVENUE DEBIT

## 2018-10-08 PROCEDURE — 77010033678 HC OXYGEN DAILY

## 2018-10-08 PROCEDURE — 74011000250 HC RX REV CODE- 250: Performed by: INTERNAL MEDICINE

## 2018-10-08 PROCEDURE — 74011000258 HC RX REV CODE- 258: Performed by: NURSE PRACTITIONER

## 2018-10-08 PROCEDURE — 81003 URINALYSIS AUTO W/O SCOPE: CPT

## 2018-10-08 PROCEDURE — 74011636637 HC RX REV CODE- 636/637: Performed by: NURSE PRACTITIONER

## 2018-10-08 PROCEDURE — 74011250637 HC RX REV CODE- 250/637: Performed by: INTERNAL MEDICINE

## 2018-10-08 PROCEDURE — 74011250637 HC RX REV CODE- 250/637: Performed by: NURSE PRACTITIONER

## 2018-10-08 PROCEDURE — 97110 THERAPEUTIC EXERCISES: CPT

## 2018-10-08 PROCEDURE — 85610 PROTHROMBIN TIME: CPT

## 2018-10-08 PROCEDURE — 82962 GLUCOSE BLOOD TEST: CPT

## 2018-10-08 PROCEDURE — 94640 AIRWAY INHALATION TREATMENT: CPT

## 2018-10-08 PROCEDURE — 65270000029 HC RM PRIVATE

## 2018-10-08 PROCEDURE — 85730 THROMBOPLASTIN TIME PARTIAL: CPT

## 2018-10-08 PROCEDURE — 85027 COMPLETE CBC AUTOMATED: CPT

## 2018-10-08 PROCEDURE — 83735 ASSAY OF MAGNESIUM: CPT

## 2018-10-08 PROCEDURE — 77030020263 HC SOL INJ SOD CL0.9% LFCR 1000ML

## 2018-10-08 PROCEDURE — 94760 N-INVAS EAR/PLS OXIMETRY 1: CPT

## 2018-10-08 PROCEDURE — 3331090001 HH PPS REVENUE CREDIT

## 2018-10-08 PROCEDURE — 80053 COMPREHEN METABOLIC PANEL: CPT

## 2018-10-08 PROCEDURE — 74011000250 HC RX REV CODE- 250: Performed by: NURSE PRACTITIONER

## 2018-10-08 PROCEDURE — 36415 COLL VENOUS BLD VENIPUNCTURE: CPT

## 2018-10-08 PROCEDURE — 74011250636 HC RX REV CODE- 250/636: Performed by: NURSE PRACTITIONER

## 2018-10-08 PROCEDURE — 97161 PT EVAL LOW COMPLEX 20 MIN: CPT

## 2018-10-08 PROCEDURE — 83690 ASSAY OF LIPASE: CPT

## 2018-10-08 RX ORDER — WARFARIN 2.5 MG/1
2.5 TABLET ORAL
Status: DISCONTINUED | OUTPATIENT
Start: 2018-10-08 | End: 2018-10-10 | Stop reason: HOSPADM

## 2018-10-08 RX ORDER — SODIUM CHLORIDE 9 MG/ML
75 INJECTION, SOLUTION INTRAVENOUS CONTINUOUS
Status: DISPENSED | OUTPATIENT
Start: 2018-10-08 | End: 2018-10-09

## 2018-10-08 RX ORDER — DOCUSATE SODIUM 100 MG/1
100 CAPSULE, LIQUID FILLED ORAL 2 TIMES DAILY
Status: DISCONTINUED | OUTPATIENT
Start: 2018-10-08 | End: 2018-10-10 | Stop reason: HOSPADM

## 2018-10-08 RX ADMIN — SACUBITRIL AND VALSARTAN 1 TABLET: 24; 26 TABLET, FILM COATED ORAL at 08:54

## 2018-10-08 RX ADMIN — SPIRONOLACTONE 25 MG: 25 TABLET ORAL at 17:54

## 2018-10-08 RX ADMIN — ALBUTEROL SULFATE 2.5 MG: 2.5 SOLUTION RESPIRATORY (INHALATION) at 19:39

## 2018-10-08 RX ADMIN — TRAZODONE HYDROCHLORIDE 100 MG: 50 TABLET ORAL at 21:52

## 2018-10-08 RX ADMIN — CARVEDILOL 6.25 MG: 6.25 TABLET, FILM COATED ORAL at 08:52

## 2018-10-08 RX ADMIN — SIMVASTATIN 20 MG: 20 TABLET, FILM COATED ORAL at 21:53

## 2018-10-08 RX ADMIN — FERROUS GLUCONATE 1 TABLET: 324 TABLET ORAL at 17:53

## 2018-10-08 RX ADMIN — TRAMADOL HYDROCHLORIDE 100 MG: 50 TABLET, FILM COATED ORAL at 05:45

## 2018-10-08 RX ADMIN — INSULIN LISPRO 2 UNITS: 100 INJECTION, SOLUTION INTRAVENOUS; SUBCUTANEOUS at 11:47

## 2018-10-08 RX ADMIN — OXYCODONE HYDROCHLORIDE AND ACETAMINOPHEN 500 MG: 500 TABLET ORAL at 08:57

## 2018-10-08 RX ADMIN — BUDESONIDE 500 MCG: 0.5 INHALANT RESPIRATORY (INHALATION) at 08:10

## 2018-10-08 RX ADMIN — ASPIRIN 81 MG: 81 TABLET, COATED ORAL at 08:55

## 2018-10-08 RX ADMIN — SPIRONOLACTONE 25 MG: 25 TABLET ORAL at 08:57

## 2018-10-08 RX ADMIN — ALBUTEROL SULFATE 2.5 MG: 2.5 SOLUTION RESPIRATORY (INHALATION) at 14:34

## 2018-10-08 RX ADMIN — FUROSEMIDE 40 MG: 40 TABLET ORAL at 08:53

## 2018-10-08 RX ADMIN — ESCITALOPRAM OXALATE 20 MG: 10 TABLET ORAL at 08:52

## 2018-10-08 RX ADMIN — Medication 5 ML: at 21:56

## 2018-10-08 RX ADMIN — INSULIN LISPRO 1 UNITS: 100 INJECTION, SOLUTION INTRAVENOUS; SUBCUTANEOUS at 21:55

## 2018-10-08 RX ADMIN — DOCUSATE SODIUM 100 MG: 100 CAPSULE, LIQUID FILLED ORAL at 17:53

## 2018-10-08 RX ADMIN — WARFARIN SODIUM 2.5 MG: 2.5 TABLET ORAL at 17:53

## 2018-10-08 RX ADMIN — SACUBITRIL AND VALSARTAN 1 TABLET: 24; 26 TABLET, FILM COATED ORAL at 21:53

## 2018-10-08 RX ADMIN — SODIUM CHLORIDE 75 ML/HR: 900 INJECTION, SOLUTION INTRAVENOUS at 17:49

## 2018-10-08 RX ADMIN — HYOSCYAMINE SULFATE: 16 SOLUTION at 08:59

## 2018-10-08 RX ADMIN — FERROUS GLUCONATE 1 TABLET: 324 TABLET ORAL at 08:54

## 2018-10-08 RX ADMIN — TRAMADOL HYDROCHLORIDE 100 MG: 50 TABLET, FILM COATED ORAL at 16:23

## 2018-10-08 RX ADMIN — TIOTROPIUM BROMIDE 18 MCG: 18 CAPSULE ORAL; RESPIRATORY (INHALATION) at 08:20

## 2018-10-08 RX ADMIN — BUDESONIDE 500 MCG: 0.5 INHALANT RESPIRATORY (INHALATION) at 19:39

## 2018-10-08 RX ADMIN — ALBUTEROL SULFATE 2.5 MG: 2.5 SOLUTION RESPIRATORY (INHALATION) at 08:10

## 2018-10-08 RX ADMIN — CARVEDILOL 6.25 MG: 6.25 TABLET, FILM COATED ORAL at 16:16

## 2018-10-08 RX ADMIN — Medication 1 AMPULE: at 08:59

## 2018-10-08 RX ADMIN — Medication 5 ML: at 05:46

## 2018-10-08 RX ADMIN — Medication 1 AMPULE: at 21:52

## 2018-10-08 RX ADMIN — CEFTRIAXONE SODIUM 2 G: 2 INJECTION, POWDER, FOR SOLUTION INTRAMUSCULAR; INTRAVENOUS at 17:50

## 2018-10-08 RX ADMIN — VITAMIN D, TAB 1000IU (100/BT) 2000 UNITS: 25 TAB at 08:57

## 2018-10-08 RX ADMIN — MULTIPLE VITAMINS W/ MINERALS TAB 1 TABLET: TAB at 08:57

## 2018-10-08 RX ADMIN — Medication 5 ML: at 14:18

## 2018-10-08 NOTE — PROGRESS NOTES
Attempted visit Patient resting in chair Donovan Puckett, staff [address], [id number] [facility] Jason  /   [email or web address] [employer]

## 2018-10-08 NOTE — PROGRESS NOTES
H&P/Consult Note/Progress Note/Office Note:  
Stacie Chao  MRN: [id number]  XVS:4/9/0886  Age:68 y.o. General Surgery Consult ordered by: Luzmaria Montez NP Reason for General Surgery Consult: Eval wound to Left knee - S/P I+D hematoma left knee after traumatic injury 9/11 with non healing wound since As previously noted HPI: Stacie Chao is a 77 y.o. female \"with extensive medical history as noted below on chronic coumadin who initially presented to ER 9/7 after fall in bathroom 8/28 where she landed directly on her left knee with complaints of dizziness and fatigue and found to have Hgb of 6.7. She was transfused to Hgb of 9.6. She also was found with a large hematoma/bullae over her left knee and INR of 4.3. She underwent an I+D per Dr Yvette Varghese on 9/11 with a large amount of serosanguinous fluid evacuated. She has been followed by home health PT, OT and wound care since discharge from rehab on ninth floor 9/11-9/21. She has done well except the wound has not closed. Two days ago, [facility] RN and daughter who is an RN noted mild erythema and heat surrounding upper half of wound. Open area approx 5-6 cm in circumference. No purulent drainage, oozing small amounts of dark blood with slightly foul odor. Full ROM to knee. All distal circulation, motion and sensation WNL for patient. No distal edema, mild local edema. Multiple old scars from prior lower extremity wounds. Patient denies pain to area, fever, systemic symptoms. She does not appear septic and lactic acid is 1.8. WBC is 11.6 without shift. Wound cultured in ER prior to administration of antibiotics. Begun on vancomycin and ceftriaxone until cultures resulted. Large, attentive family at bedside. Creatinine 1.48 on admission with baseline of 1.2. Of note, patient reports diarrhea x 4 days without use of antibiotics, additional GI symptoms. \"   
 
 
10/7/18: Pt sitting up on side of the bed just finishing breakfast. No complaints. AF, NAD. WBC 8.8, Pt taking Coumadin 2.5mg qhs. INR 3.0. 
10/8/18: POD1 s/p left knee debridement; awake in bed, no complaints. AF, VSS. Cx growing P. Mirabilis. Past Medical History:  
Diagnosis Date  Anticoagulated on Coumadin 2013 S/P AVR  CAD (coronary artery disease) 2013 PCI LAD with stent placed  Cardiomy[facility]  CHF (congestive heart failure) ([facility] 2017  CKD (chronic kidney disease) stage 3, GFR 30-59 ml/min ([facility]) 7/10/2013  COPD (chronic obstructive pulmonary disease) ([facility] 2014  Essential hypertension, benign 2013  HLD (hyperlipidemia)  ICD (implantable cardioverter-defibrillator) in place 10/2/2014 Biotronik single-chamber ICD implantation 10/20/14  Iron deficiency anemia due to chronic blood loss 2009  MDS (myelodysplastic syndrome) ([facility] 2011 Procrit started in 11 Procrit weekly and Iron stores. 12 good response to 3 weekly procrit did not need it last time  3- Pt doing well. Just wanted a \"check-up. \" Responding to Procrit every three weeks. 13 patient has missed some injections on recently restarted hemoglobin only issue , takes oral iron iron stores each time  REJI (obstructive sleep apnea)-cpap 2014  Osteoarthritis  Osteopenia  Quadrantanopia  Visual complaint 2015 Past Surgical History:  
Procedure Laterality Date [address]    HX AORTIC VALVE REPLACEMENT  ,   
 mechanical valve   HX  SECTION    
 HX CORONARY STENT PLACEMENT  May, 2014 STEMI with Stent placement.  HX ENDOSCOPY  2009 EGD [address] CATHETERIZATION    HX PACEMAKER  10/2/2014 Biotronik ICD  HX TUBAL LIGATION    
 IR BX BONE MARROW DIAGNOSTIC  2011 Current Facility-Administered Medications Medication Dose Route Frequency  Vancomycin trough reminder   Other ONCE  
  warfarin (COUMADIN) tablet 2.5 mg  2.5 mg Oral QHS  traZODone (DESYREL) tablet 100 mg  100 mg Oral QHS  sodium hypochlorite (HALF STRENGTH DAKIN'S) 0.25% irrigation (bottle)   Topical DAILY  sodium chloride (NS) flush 5-10 mL  5-10 mL IntraVENous Q8H  
 sodium chloride (NS) flush 5-10 mL  5-10 mL IntraVENous PRN  
 acetaminophen (TYLENOL) tablet 650 mg  650 mg Oral Q4H PRN  
 traMADol (ULTRAM) tablet 50 mg  50 mg Oral Q6H PRN  
 traMADol (ULTRAM) tablet 100 mg  100 mg Oral Q6H PRN  
 cefTRIAXone (ROCEPHIN) 2 g in 0.9% sodium chloride (MBP/ADV) 50 mL  2 g IntraVENous Q24H  
 insulin lispro (HUMALOG) injection   SubCUTAneous AC&HS  
 albuterol (PROVENTIL VENTOLIN) nebulizer solution 2.5 mg  2.5 mg Nebulization Q4H PRN  
 ascorbic acid (vitamin C) (VITAMIN C) tablet 500 mg  500 mg Oral DAILY  aspirin delayed-release tablet 81 mg  81 mg Oral DAILY  calcium carbonate (TUMS) chewable tablet 200 mg [elemental]  200 mg Oral BID  carvedilol (COREG) tablet 6.25 mg  6.25 mg Oral BID WITH MEALS  cholecalciferol (VITAMIN D3) tablet 2,000 Units  2,000 Units Oral DAILY  escitalopram oxalate (LEXAPRO) tablet 20 mg  20 mg Oral DAILY  ferrous gluconate 324 mg (38 mg iron) tablet 1 Tab  1 Tab Oral BID  fluticasone (FLONASE) 50 mcg/actuation nasal spray 2 Spray  2 Spray Both Nostrils DAILY PRN  
 furosemide (LASIX) tablet 40 mg  40 mg Oral DAILY  isosorbide mononitrate ER (IMDUR) tablet 30 mg  30 mg Oral DAILY  loratadine (CLARITIN) tablet 10 mg  10 mg Oral DAILY PRN  
 multivitamin, tx-iron-ca-min (THERA-M w/ IRON) tablet 1 Tab  1 Tab Oral DAILY  sacubitril-valsartan (ENTRESTO) 24-26 mg tablet 1 Tab  1 Tab Oral BID  simvastatin (ZOCOR) tablet 20 mg  20 mg Oral QHS  spironolactone (ALDACTONE) tablet 25 mg  25 mg Oral BID  tiotropium (SPIRIVA) inhalation capsule 18 mcg  18 mcg Inhalation DAILY  budesonide (PULMICORT) 500 mcg/2 ml nebulizer suspension  500 mcg Nebulization BID RT And  
 albuterol (PROVENTIL VENTOLIN) nebulizer solution 2.5 mg  2.5 mg Nebulization Q6HWA RT  
 influenza vaccine 2018-19 (6 mos+)(PF) (FLUARIX QUAD/FLULAVAL QUAD) injection 0.5 mL  0.5 mL IntraMUSCular PRIOR TO DISCHARGE  vancomycin (VANCOCIN) 1,000 mg in 0.9% sodium chloride (MBP/ADV) 250 mL  1,000 mg IntraVENous Q24H  
 alcohol 62% (NOZIN) nasal  1 Ampule  1 Ampule Topical Q12H Sulfa (sulfonamide antibiotics) and Aspirin Social History Social History  Marital status:  Spouse name: N/A  
 Number of children: N/A  
 Years of education: N/A Occupational History   Retired  
  deli Social History Main Topics  Smoking status: Former Smoker Packs/day: 0.25 Years: 42.00 Types: Cigarettes Start date: 10/1/1956 Quit date: 5/1/2014  Smokeless tobacco: Never Used  Alcohol use No  
 Drug use: No  
 Sexual activity: Not Asked Other Topics Concern  Weight Concern Yes  Special Diet Yes Social History Narrative Lives with her daughter. Ambulates only short distances. History Smoking Status  Former Smoker  Packs/day: 0.25  
 Years: 42.00  Types: Cigarettes  Start date: 10/1/1956  Quit date: 5/1/2014 Smokeless Tobacco  
 Never Used Family History Problem Relation Age of Onset  Heart Disease Mother CHF  Hypertension Mother  Kidney Disease Mother  Heart Disease Father CHF  Lung Disease Father  Diabetes Father  Cancer Father   
  prostate  Hypertension Father  Heart Attack Father  Heart Disease Maternal Aunt  Diabetes Brother  Coronary Artery Disease Other  Breast Cancer Neg Hx   
 
ROS: The patient has no difficulty with chest pain or shortness of breath. No fever or chills. Comprehensive review of systems was otherwise unremarkable except as noted above. Physical Exam:  
Visit Vitals  /73 (BP 1 Location: Right arm, BP Patient Position: At rest)  Pulse 74  Temp 98.3 °F (36.8 °C)  Resp 17  Ht 5' 2\" (1.575 m)  Wt 209 lb (94.8 kg)  SpO2 93%  BMI 38.23 kg/m2 Vitals:  
 10/08/18 7874 10/08/18 7906 10/08/18 0715 10/08/18 4537 BP:  134/57 119/73 Pulse:  69 74 Resp:  16 17 Temp:  97.7 °F (36.5 °C) 98.3 °F (36.8 °C) SpO2: 95% 97% 97% 93% Weight:      
Height:      
 
10/08 0701 - 10/08 1900 In: -  
Out: 436 [[id number]] 10/06 1901 - 10/08 0700 In: -  
Out: [place] 1962 Constitutional: Obese, Alert, cooperative, no acute distress; appears stated age Eyes:Sclera are clear. EOMs intact ENMT: no external lesions gross hearing normal; no obvious neck masses, no ear or lip lesions, nares normal 
CV: RRR. Normal perfusion Resp: No JVD. Breathing is  non-labored; no audible wheezing. GI: soft, non-tender, non-distended Integument: Open area to Left knee with exposed adipose s/p I&D, wound clean with some scarred edges, no evidence of necrosis. Musculoskeletal: No embolic signs or cyanosis. Neuro:  Oriented; moves all 4; no focal deficits Psychiatric: normal affect and mood, no memory impairment Recent vitals (if inpt): 
Patient Vitals for the past 24 hrs: 
 BP Temp Pulse Resp SpO2  
10/08/18 0812 - - - - 93 % 10/08/18 0715 119/73 98.3 °F (36.8 °C) 74 17 97 % 10/08/18 0352 134/57 97.7 °F (36.5 °C) 69 16 97 % 10/08/18 0035 - - - - 95 % 10/07/18 2318 119/82 98.2 °F (36.8 °C) 67 16 98 % 10/07/18 2119 - - - - 98 % 10/07/18 2116 135/73 98.3 °F (36.8 °C) 68 18 99 % 10/07/18 1921 116/77 98.3 °F (36.8 °C) 66 18 98 % 10/07/18 1511 105/66 98.3 °F (36.8 °C) 68 16 97 % 10/07/18 1402 - - - - 96 % 10/07/18 1036 101/48 97.8 °F (36.6 °C) 85 18 97 % Labs: 
Recent Labs 10/08/18 
 0298 WBC  7.7 HGB  9.5* PLT  187 NA  140  
K  4.2 CL  100 CO2  36* BUN  38* CREA  1.81* GLU  97 PTP  25.8* INR  2.5 APTT  55.0* TBILI  0.4 SGOT  11* ALT  11* AP  47* LPSE  157 Lab Results Component Value Date/Time WBC 7.7 10/08/2018 04:26 AM  
 HGB 9.5 (L) 10/08/2018 04:26 AM  
 PLATELET 796 21/96/9962 04:26 AM  
 Sodium 140 10/08/2018 04:26 AM  
 Potassium 4.2 10/08/2018 04:26 AM  
 Chloride 100 10/08/2018 04:26 AM  
 CO2 36 (H) 10/08/2018 04:26 AM  
 BUN 38 (H) 10/08/2018 04:26 AM  
 Creatinine 1.81 (H) 10/08/2018 04:26 AM  
 Glucose 97 10/08/2018 04:26 AM  
 INR 2.5 10/08/2018 04:26 AM  
 aPTT 55.0 (H) 10/08/2018 04:26 AM  
 Bilirubin, total 0.4 10/08/2018 04:26 AM  
 AST (SGOT) 11 (L) 10/08/2018 04:26 AM  
 ALT (SGPT) 11 (L) 10/08/2018 04:26 AM  
 Alk. phosphatase 47 (L) 10/08/2018 04:26 AM  
 Amylase 88 01/31/2012 03:12 PM  
 Lipase 157 10/08/2018 04:26 AM  
 Lactic acid 0.7 02/16/2016 06:16 AM  
 Troponin-I <0.05 01/31/2012 03:32 PM  
 Troponin-I, Qt. 0.08 (H) 12/01/2016 02:20 AM  
 
 
10/6/18: Two-view left knee x-ray 
  
Reference exam: September 7, 2018 
  
INDICATION: Diabetic with open wound 
  
FINDINGS: 2 images are presented, soft tissue edema and irregularity is seen 
along the inferior patellar tendon anteriorly but there is no joint effusion 
seen and no bony injury identified. 
  
IMPRESSION: Soft tissue injury seen but no bony injury noted. If osteomyelitis 
is suspected MRI should be considered. I reviewed recent labs and recent radiologic studies. I independently reviewed radiology images for studies I described above or studies I have ordered. Admission date (for inpatients): 10/6/2018 * No surgery found *  * No surgery found * ASSESSMENT/PLAN: 
Problem List  Date Reviewed: 10/6/2018 Codes Class Noted * (Principal)Abscess ICD-10-CM: L02.91 
ICD-9-CM: 682.9  10/6/2018 History of incision and drainage ICD-10-CM: Z98.890 ICD-9-CM: V45.89  10/6/2018 Traumatic hematoma of left knee (Chronic) ICD-10-CM: B77.85UJ ICD-9-CM: 924.11  9/8/2018 Debility ICD-10-CM: R53.81 ICD-9-CM: 799.3  9/8/2018 Anemia (Chronic) ICD-10-CM: D64.9 ICD-9-CM: 285.9  9/7/2018 Chronic respiratory failure with hypoxia (HCC) (Chronic) ICD-10-CM: J96.11 
ICD-9-CM: 518.83, 799.02  9/7/2018 Encounter for immunization ICD-10-CM: P98 ICD-9-CM: V03.89  8/21/2018 Obesity, morbid ([facility] (Chronic) ICD-10-CM: E66.01 
ICD-9-CM: 278.01  6/8/2018 Physical debility (Chronic) ICD-10-CM: R53.81 ICD-9-CM: 799.3  5/2/2018 Closed nondisplaced fracture of shaft of fifth metacarpal bone of left hand ICD-10-CM: G90.953A ICD-9-CM: 815.03  4/28/2018 Subdural hematoma ([facility] ICD-10-CM: X03.8L3N 
ICD-9-CM: 432.1  4/27/2018 Long term (current) use of anticoagulants (Chronic) ICD-10-CM: Z79.01 
ICD-9-CM: V58.61  4/19/2018 Dysthymia ICD-10-CM: F34.1 ICD-9-CM: 300.4  4/5/2018 Type 2 diabetes mellitus with nephropathy (HCC) (Chronic) ICD-10-CM: E11.21 
ICD-9-CM: 250.40, 583.81  12/18/2017 Pulmonary hypertension (HCC) (Chronic) ICD-10-CM: I27.20 ICD-9-CM: 416.8  6/15/2016 S/P AVR (aortic valve replacement) (Chronic) ICD-10-CM: Z95.2 ICD-9-CM: V43.3  Unknown Overview Signed 2/23/2016 11:04 AM by Melecio Lozoya Mechanical/Artific Cardiomyopathy ([facility] (Chronic) ICD-10-CM: I42.9 ICD-9-CM: 425.4  Unknown Osteopenia ICD-10-CM: M85.80 ICD-9-CM: 733.90  Unknown HLD (hyperlipidemia) (Chronic) ICD-10-CM: J89.2 ICD-9-CM: 272.4  Unknown Osteoarthritis ICD-10-CM: M19.90 ICD-9-CM: 715.90  Unknown  
   
 ICD (implantable cardioverter-defibrillator) in place ICD-10-CM: Z95.810 ICD-9-CM: V45.02  10/2/2014 Overview Signed 10/2/2014  4:58 PM by Hillary Diallo III Biotronik single-chamber ICD implantation 10/20/14 Diabetes mellitus type 2, diet-controlled (HCC) (Chronic) ICD-10-CM: E11.9 ICD-9-CM: 250.00  8/28/2014 Overview Signed 10/6/2018  8:56 PM by Gela Taylor NP  
  A1C: 5.7 COPD (chronic obstructive pulmonary disease) (HCC) (Chronic) ICD-10-CM: J44.9 ICD-9-CM: 387  4/2/2014 Overview Signed 10/6/2018  8:56 PM by Gela Taylor NP  
  HOME OXYGEN 3 LITERS 
  
  
   
 REJI (obstructive sleep apnea)-cpap (Chronic) ICD-10-CM: D50.71 
ICD-9-CM: 327.23  4/2/2014 CKD (chronic kidney disease) stage 3, GFR 30-59 ml/min (HCC) (Chronic) ICD-10-CM: N18.3 ICD-9-CM: 585.3  7/10/2013 Anticoagulated on Coumadin (Chronic) ICD-10-CM: Z51.81, Z79.01 
ICD-9-CM: V58.83, V58.61  7/9/2013 Overview Signed 7/9/2013  4:16 PM by Tsosie Duane, NP  
  S/P AVR 
  
  
   
 CAD (coronary artery disease) (Chronic) ICD-10-CM: I25.10 ICD-9-CM: 414.00  1/20/2013 Overview Signed 5/20/2014 10:05 AM by Kannan Cisneros NP  
  5/8/14 PCI LAD with stent placed Chronic combined systolic and diastolic heart failure (HCC) (Chronic) ICD-10-CM: I50.42 
ICD-9-CM: 428.42  1/20/2013 Overview Addendum 4/28/2018  4:53 AM by Andreina Saul MD  
  5/8/14 ECHO:  EF 10-15% 12/2017:  EF 25-30% Essential hypertension, benign (Chronic) ICD-10-CM: I10 
ICD-9-CM: 401.1  1/20/2013 MDS (myelodysplastic syndrome) (HCC) (Chronic) ICD-10-CM: D46.9 ICD-9-CM: 238.75  12/17/2011 Overview Addendum 8/29/2013 11:06 AM by Ashley Underwood Procrit started in August, 2011 12/18/11 Procrit weekly and Iron stores. 5-12  
 
 
 
12-13-12 good response to 3 weekly procrit did not need it last time 3-7-13 Pt doing well. Just wanted a \"check-up. \" Responding to Procrit every three weeks. 8-29-13 patient has missed some injections on recently restarted hemoglobin only issue , takes oral iron iron stores each time Iron deficiency anemia due to chronic blood loss (Chronic) ICD-10-CM: D50.0 ICD-9-CM: 280.0  7/29/2009 Principal Problem: 
  Abscess (10/6/2018) Active Problems: 
  MDS (myelodysplastic syndrome) ([address] (12/17/2011) Overview: Procrit started in August, 2011 12/18/11 Procrit weekly and Iron stores. 5-12 12-13-12 good response to 3 weekly procrit did not need it last time 3-7-13 Pt doing well. Just wanted a \"check-up. \" Responding to Procrit  
    every three weeks. 8-29-13 patient has missed some injections on recently restarted  
    hemoglobin only issue , takes oral iron iron stores each time CAD (coronary artery disease) (1/20/2013) Overview: 5/8/14 PCI LAD with stent placed Chronic combined systolic and diastolic heart failure ([address] (1/20/2013) Overview: 5/8/14 ECHO:  EF 10-15% 12/2017:  EF 25-30% Essential hypertension, benign (1/20/2013) Anticoagulated on Coumadin (7/9/2013) Overview: S/P AVR 
 
  CKD (chronic kidney disease) stage 3, GFR 30-59 ml/min ([facility]) (7/10/2013) COPD (chronic obstructive pulmonary disease) ([address] (4/2/2014) Overview: HOME OXYGEN 3 LITERS 
 
  REJI (obstructive sleep apnea)-cpap (4/2/2014) Diabetes mellitus type 2, diet-controlled ([address] (8/28/2014) Overview: A1C: 5.7 ICD (implantable cardioverter-defibrillator) in place (10/2/2014) Overview: Bi[employer]ronik single-chamber ICD implantation 10/20/14 S/P AVR (aortic valve replacement) () Overview: Mechanical/Artific Cardiomyopathy ([address] () Pulmonary hypertension ([address] (6/15/2016) Physical debility (5/2/2018) Obesity, morbid ([address] (6/8/2018) Anemia (9/7/2018) Chronic respiratory failure with hypoxia ([address] (9/7/2018) Traumatic hematoma of left knee (9/8/2018) History of incision and drainage (10/6/2018) Plan: 
Care Management per Hospitalist 
IV Abx - Vancomycin and Rocephin Cx growing P. Mirabilis so far Wound care following [facility] place [facility] tomorrow Signed: Parveen Chin NP 
 
 I have seen and examined the patient with the Nurse Practitioner and agree with the above assessment and plan. Jodie Conley.  Hazel Mcelroy MD

## 2018-10-08 NOTE — PROGRESS NOTES
END OF SHIFT NOTE: 
 
INTAKE/OUTPUT 
10/07 0701 - 10/08 0700 In: -  
Out: 723 [HGDLH:346] Voiding: YES Catheter: NO 
Drain:   
 
 
 
 
 
Flatus: Patient does have flatus present. Stool:  0 occurrences. Characteristics: 
  
 
Emesis: 0 occurrences. Characteristics: VITAL SIGNS Patient Vitals for the past 12 hrs: 
 Temp Pulse Resp BP SpO2  
10/08/18 0352 97.7 °F (36.5 °C) 69 16 134/57 97 % 10/08/18 0035 - - - - 95 % 10/07/18 2318 98.2 °F (36.8 °C) 67 16 119/82 98 % 10/07/18 2119 - - - - 98 % 10/07/18 2116 98.3 °F (36.8 °C) 68 18 135/73 99 % 10/07/18 1921 98.3 °F (36.8 °C) 66 18 116/77 98 % Pain Assessment Pain Intensity 1: 0 (10/07/18 1511) Patient Stated Pain Goal: 0 Ambulating Yes with help to bathroom. Slept long periods tonight. Appetite poor; states she is just not hungry Declined pain meds multiple times this shift. Family with her. Leg drsg with small mat clear shadowing seen under tape. Pedal pulses palpable bilaterally. Shift report given to oncoming nurse at the bedside.  
 
Genesis Gold RN

## 2018-10-08 NOTE — PROGRESS NOTES
Warfarin dosing per pharmacist 
 
Berry Collier Makayla Arreola is a 77 y.o. female. Height: 5' 2\" (157.5 cm)    Weight: 94.8 kg (209 lb) Indication:  AVR Goal INR:  2-3 Home dose:  2.5 mg daily Risk factors or significant drug interactions:  N/A Other anticoagulants:  N/A Daily Monitoring Date  INR     Warfarin dose HGB              Notes 10/6  2.6  2.5 mg  10.9 
10/7  3  2 mg  9 
10/8  2.5  2.5 mg  9.5 The patient is followed by a warfarin clinic which she had a meeting with on 10/5 with an INR of 2.6. INR 2.5. Will resume home dose of 2.5 mg qhs and follow daily INR. Pharmacy will continue to follow. Please call with any questions. Thank you, Iris Fields, PharmD Clinical Pharmacist 
[phone]

## 2018-10-08 NOTE — PROGRESS NOTES
Problem: Mobility Impaired (Adult and Pediatric) Goal: *Acute Goals and Plan of Care (Insert Text) LTG: 
(1.)Ms. Diaz will move from supine to sit and sit to supine , scoot up and down and roll side to side in flat bed without siderails with  INDEPENDENT within 7 day(s). (2.)Ms. Diaz will perform all functional transfers with  INDEPENDENT using the least restrictive/no device within 7 day(s). (3.)Ms. Diaz will ambulate with  SUPERVISION for 250+ feet with normal vital sign response with the least restrictive/no device within 7 day(s). (4.)Ms. Diaz will ambulate up/down 4 steps with bilateral  railing with  SUPERVISION with no device within 7 day(s). PHYSICAL THERAPY: Initial Assessment, Treatment Day: Day of Assessment, AM 10/8/2018 INPATIENT: Hospital Day: 3 [place] / Product Type: Medicare /  
  
NAME/AGE/GENDER: Mary Herman is a 77 y.o. female PRIMARY DIAGNOSIS: Abscess Abscess Abscess ICD-10: Treatment Diagnosis:  
 · Other abnormalities of gait and mobility (R26.89) · History of falling (Z91.81) Precaution/Allergies: 
Sulfa (sulfonamide antibiotics) and Aspirin ASSESSMENT:  
 
Ms. Diaz presents supine in bed, pleasant and agreeable for skilled PT. She transitioned to sit with modified independence. She stood with SBA and ambulated 250' with SBA on 3L 02. SpO2 88% after gait but increased to 93% with rest and pursed lip breathing. Patient with knee abscess and at risk for declining function while in hospital.  She may get wound vac which might limit her function. She would benefit from 1-2 more treatments to ensure that she remains mobile and active while in acute care. Initiated treatment to include below exercises. Patient with good participation. This section established at most recent assessment PROBLEM LIST (Impairments causing functional limitations): 
 1. Decreased Ambulation Ability/Technique 2. Decreased Activity Tolerance 3. Increased Shortness of Breath 4. Decreased Skin Integrity/Hygeine 5. Decreased [place] with Home Exercise Program 
 INTERVENTIONS PLANNED: (Benefits and precautions of physical therapy have been discussed with the patient.) 1. Balance Exercise 2. Bed Mobility 3. Gait Training 4. Home Exercise Program (HEP) 5. Therapeutic Activites 6. Therapeutic Exercise/Strengthening 7. Transfer Training 8. education 9. Group Therapy TREATMENT PLAN: Frequency/Duration: 2 times a week for 1 week Rehabilitation Potential For Stated Goals: Excellent RECOMMENDED REHABILITATION/EQUIPMENT: (at time of discharge pending progress): Due to the probability of continued deficits (see above) this patient will likely need continued skilled physical therapy after discharge. ??? Pending progress. Equipment:  
? None at this time HISTORY:  
History of Present Injury/Illness (Reason for Referral): 
Per MD note, \"Patient is a 77 y.o.  female with extensive medical history as noted below on chronic coumadin who initially presented to ER 9/7 after fall in bathroom 8/28 where she landed directly on her left knee with complaints of dizziness and fatigue and found to  Have Hgb of 6.7. She was transfused to Hgb of 9.6. She also was found with a large hematoma/bullae over her left knee and INR of 4.3. She underwent an I+D per Dr Renetta Osorio on 9/11 with a large amount of serosanguinous fluid evacuated. She has been followed by home health PT, OT and wound care since discharge from rehab on ninth floor 9/11-9/21. She has done well except the wound has not closed. Two days ago, [facility] RN and daughter who is an RN noted mild erythema and heat surrounding upper half of wound. Open area approx 5-6 cm in circumference. No purulent drainage, oozing small amounts of dark blood with slightly foul odor. Full ROM to knee.   All distal circulation, motion and sensation WNL for patient. No distal edema, mild local edema. Multiple old scars from prior lower extremity wounds. Patient denies pain to area, fever, systemic symptoms. She does not appear septic and lactic acid is 1.8. WBC is 11.6 without shift. Wound cultured by me in ER prior to administration of antibiotics. Begun on vancomycin and ceftriaxone until cultures resulted. Large, attentive family at bedside. Creatinine 1.48 on admission with baseline of 1.2. Of note, patient reports diarrhea x 4 days without use of antibiotics, additional GI symptoms.  
  
Dr Julián López follows patient for her coumadin dosing, was cut from 5 mg daily to 2.5 mg daily on 9/25 with INR of 3.9 (target: 2-3). She also has MDS syndrome, sees Hematology and receives regular blood transfusions and procrit. \" 
Past Medical History/Comorbidities: Ms. Vitor Loyola  has a past medical history of Anticoagulated on Coumadin (7/9/2013); CAD (coronary artery disease) (1/20/2013); Cardiomyopathy [facility]); CHF (congestive heart failure) ([facility] (2/17/2017); CKD (chronic kidney disease) stage 3, GFR 30-59 ml/min ([facility]) (7/10/2013); COPD (chronic obstructive pulmonary disease) ([facility] (4/2/2014); Essential hypertension, benign (1/20/2013); HLD (hyperlipidemia); ICD (implantable cardioverter-defibrillator) in place (10/2/2014); Iron deficiency anemia due to chronic blood loss (7/29/2009); MDS (myelodysplastic syndrome) ([facility] (12/17/2011); REJI (obstructive sleep apnea)-cpap (4/2/2014); Osteoarthritis; Osteopenia; Quadrantanopia; and Visual complaint (12/14/2015). She also has no past medical history of Contact dermatitis and other eczema, due to unspecified cause; Difficult intubation; Malignant hyperthermia due to anesthesia; Nausea & vomiting; Pseudocholinesterase deficiency; or Unspecified adverse effect of anesthesia.   Ms. Vitor Loyola  has a past surgical history that includes cardiac catheterization (); ir bx bone marrow diagnostic (2011); hx aortic valve replacement (, ); hx  section; hx tubal ligation; hx heart catheterization (); hx coronary stent placement (May, 2014); hx pacemaker (10/2/2014); and hx endoscopy (2009). Social History/Living Environment:  
Home Environment: Apartment # Steps to Enter: 4 One/Two Story Residence: One story Living Alone: Yes Support Systems: Family member(s), Home care staff Patient Expects to be Discharged to[de-identified] [facility] Current DME Used/Available at Home: Shower chair, Grab bars, Commode, bedside, Raised toilet seat, Walker, rolling Tub or Shower Type: Tub/Shower combination Prior Level of Function/Work/Activity: 
Lives alone. Ambulates independently in home and community. Number of Personal Factors/Comorbidities that affect the Plan of Care: 3+: HIGH COMPLEXITY EXAMINATION:  
Most Recent Physical Functioning:  
Gross Assessment: 
AROM: Generally decreased, functional 
Strength: Generally decreased, functional 
Tone: Normal 
         
  
Posture: 
Posture (WDL): Exceptions to [facility] Posture Assessment: Forward head, Rounded shoulders Balance: 
Sitting: Intact Standing: Intact Bed Mobility: 
Supine to Sit: Modified independent; Additional time Scooting: Independent; Additional time Wheelchair Mobility: 
  
Transfers: 
Sit to Stand: Stand-by assistance Stand to Sit: Stand-by assistance Bed to Chair: Stand-by assistance Gait: 
  
Speed/Winifred: Slow Step Length: Right shortened;Left shortened Distance (ft): 250 Feet (ft) Ambulation - Level of Assistance: Stand-by assistance Body Structures Involved: 1. Metabolic Body Functions Affected: 1. Movement Related 2. Skin Related Activities and Participation Affected: 1. Mobility 2. Self Care 3. Domestic Life Number of elements that affect the Plan of Care: 3: MODERATE COMPLEXITY CLINICAL PRESENTATION:  
 Presentation: Evolving clinical presentation with changing clinical characteristics: MODERATE COMPLEXITY CLINICAL DECISION MAKING:  
[facility] AM-PAC 6 Clicks Basic Mobility Inpatient Short Form How much difficulty does the patient currently have. .. Unable A Lot A Little None 1. Turning over in bed (including adjusting bedclothes, sheets and blankets)? [] 1   [] 2   [x] 3   [] 4  
2. Sitting down on and standing up from a chair with arms ( e.g., wheelchair, bedside commode, etc.)   [] 1   [] 2   [x] 3   [] 4  
3. Moving from lying on back to sitting on the side of the bed? [] 1   [] 2   [x] 3   [] 4 How much help from another person does the patient currently need. .. Total A Lot A Little None 4. Moving to and from a bed to a chair (including a wheelchair)? [] 1   [] 2   [x] 3   [] 4  
5. Need to walk in hospital room? [] 1   [] 2   [x] 3   [] 4  
6. Climbing 3-5 steps with a railing? [] 1   [] 2   [x] 3   [] 4  
© 2007, Trustees of [facility], under license to [employer]. All rights reserved Score:  Initial: 18 Most Recent: X (Date: -- ) Interpretation of Tool:  Represents activities that are increasingly more difficult (i.e. Bed mobility, Transfers, Gait). Score 24 23 22-20 19-15 14-10 9-7 6 Modifier CH CI CJ CK CL CM CN   
 
? Mobility - Walking and Moving Around:  
  - CURRENT STATUS: CK - 40%-59% impaired, limited or restricted  - GOAL STATUS: CJ - 20%-39% impaired, limited or restricted  - D/C STATUS:  ---------------To be determined--------------- [place] / [place] B / Product Type: Medicare /   
 
Medical Necessity:    
· Patient is expected to demonstrate progress in strength, range of motion, balance and functional technique to increase independence with   and improve safety during all functional mobility. Reason for Services/Other Comments: · Patient continues to require skilled intervention due to medical complications. Use of outcome tool(s) and clinical judgement create a POC that gives a: Clear prediction of patient's progress: LOW COMPLEXITY  
  
 
 
 
TREATMENT:  
(In addition to Assessment/Re-Assessment sessions the following treatments were rendered) Pre-treatment Symptoms/Complaints:   
Pain: Initial:  
Pain Intensity 1: 0  Post Session:  0 Therapeutic Exercise: (  10 minutes):  Exercises per grid below to improve strength and coordination. Required minimal visual and verbal cues to promote proper body alignment and promote proper body breathing techniques. Progressed complexity of movement as indicated. DATE: 10/08/18 Ambulation Hip Flexion x20 AB Long Arc Quads x20 AB Knee Squeezes Ankle DF/PF x20 AB Key:  A=active, AA=active assisted, P=passive, B=bilaterally, R=right, L=left DF=dorsiflexion, PF=plantarflexion Braces/Orthotics/Lines/Etc:  
· O2 Device: Nasal cannula Treatment/Session Assessment:   
· Response to Treatment:  Pleasant and cooperative. · Interdisciplinary Collaboration:  
o Physical Therapist 
o Registered Nurse · After treatment position/precautions:  
o Up in chair 
o Bed/Chair-wheels locked 
o Call light within reach · Compliance with Program/Exercises: compliant all of the time. · Recommendations/Intent for next treatment session: \"Next visit will focus on advancements to more challenging activities and reduction in assistance provided\". Total Treatment Duration: PT Patient Time In/Time Out Time In: 9677 Time Out: 1100 Tapan Fears, PT, DPT

## 2018-10-08 NOTE — PROCEDURES
Excisional Debridement Procedure Note Performed By:  Steffany Miller MD  
 
Anesthesia: None Procedure Details: The risks,benefits and alternatives  were explained and consent was obtained for the procedure. RBAs also explained. The area was cleansed with saline/wound cleanser. A #10 scalpel was used to perform excisional debridement by excising a large full thickness eschar. The wound dimensions are 6 x 6 x 0.3 cm. There is full thickness tissue (dermis and epidermis) loss down into subcutaneous fat layer. No exposed tendon. Wound is inferior to knee. Some purulent fluid was trapped and cleansed. The wound was irrigated with isotonic saline/wound cleanser. Bleeding was minimal and was controlled with pressure. The wound was dressed with Xeroform, guaze and wrapped with rolled guaze. Estimated Blood Loss:  Minimal 
        
Complications:  None; patient tolerated the procedure well. Condition: stable Signed By: Steffany Miller MD

## 2018-10-08 NOTE — WOUND CARE
Patient seen with Dr Nona Patel and Johnathon Portillo for left leg wound (just below knee). Debrided over the weekend. Adipose tissue and pink base. Is on coumadin, may start [facility] tomorrow per discussion and watch for potential bleeding. Discussed with patient and daughter Vidya Jody.

## 2018-10-08 NOTE — PROGRESS NOTES
Hospitalist Progress Note Subjective:  
Daily Progress Note: 10/8/2018 5:10 PM 
 
Patient is a 77 y.o.  female with extensive history on chronic coumadin who initially presented to ER 9/7 after fall in bathroom 8/28 where she landed directly on her left knee with complaints of dizziness and fatigue and found to [facility] Hgb of 6.7.  She was transfused to Hgb of 9.6.  She also was found with a large hematoma/bullae over her left knee and INR of 4.3.  She underwent an I+D per Dr Christophe Vogel on 9/11 with a large amount of serosanguinous fluid evacuated. Tiffanie Rodriguez has been followed by home health PT, OT and wound care since discharge from rehab on ninth floor 9/11-9/21. Tiffanie Rodriguez has done well except the wound has not closed.  Two days ago, [facility] RN and daughter who is an RN noted mild erythema and heat surrounding upper half of wound.  Open area approx 5-6 cm in circumference.  No purulent drainage, oozing small amounts of dark blood with slightly foul odor.  Full ROM to knee.  All distal circulation, motion and sensation WNL for patient.  No distal edema, mild local edema.  Multiple old scars from prior lower extremity wounds.  Patient denies pain to area, fever, systemic symptoms.  She does not appear septic and lactic acid is 1.8.  WBC is 11.6 without shift. Lipase 620. Wound cultured in ER prior to administration of antibiotics.  Begun on vancomycin and ceftriaxone until cultures resulted. Alvino Bamberger, attentive family at bedside. Creatinine 1.48 on admission with baseline of 1.2.  Of note, patient reports diarrhea x 4 days without use of antibiotics, additional GI symptoms. Dr Betzaida Duong follows patient for her coumadin dosing, was cut from 5 mg daily to 2.5 mg daily on 9/25 with INR of 3.9 (target: 2-3). She also has MDS syndrome, sees Hematology and receives regular blood transfusions and procrit.  
 10/7:  WBC down to 8.8, Hgb: 9.0 from 10.9. INR: 3.0 today.   Creatinine 1.50 up from 1.48. Preliminary wound culture growing Gram Negative Rods. Surgery in.  PT/OT in. Patient exhausted, did not sleep last night. 10/8: Wound culture returned with Moderate Proteus Mirabilis, de-escalate antibiotics to rocephin. Blood cultures negative to date. Underwent bedside Left knee wound debridement per Dr Sylvie Stephens yesterday pm with consideration for wound vac placement. Wound care RN in also. Currently up in chair without complaints. Daughter at bedside. T max x 24 hours:  98.3. Current Facility-Administered Medications Medication Dose Route Frequency  Vancomycin trough reminder   Other ONCE  warfarin (COUMADIN) tablet 2.5 mg  2.5 mg Oral QHS  docusate sodium (COLACE) capsule 100 mg  100 mg Oral BID  traZODone (DESYREL) tablet 100 mg  100 mg Oral QHS  sodium hypochlorite (HALF STRENGTH DAKIN'S) 0.25% irrigation (bottle)   Topical DAILY  sodium chloride (NS) flush 5-10 mL  5-10 mL IntraVENous Q8H  
 sodium chloride (NS) flush 5-10 mL  5-10 mL IntraVENous PRN  
 acetaminophen (TYLENOL) tablet 650 mg  650 mg Oral Q4H PRN  
 traMADol (ULTRAM) tablet 50 mg  50 mg Oral Q6H PRN  
 traMADol (ULTRAM) tablet 100 mg  100 mg Oral Q6H PRN  
 cefTRIAXone (ROCEPHIN) 2 g in 0.9% sodium chloride (MBP/ADV) 50 mL  2 g IntraVENous Q24H  
 insulin lispro (HUMALOG) injection   SubCUTAneous AC&HS  
 albuterol (PROVENTIL VENTOLIN) nebulizer solution 2.5 mg  2.5 mg Nebulization Q4H PRN  
 ascorbic acid (vitamin C) (VITAMIN C) tablet 500 mg  500 mg Oral DAILY  aspirin delayed-release tablet 81 mg  81 mg Oral DAILY  calcium carbonate (TUMS) chewable tablet 200 mg [elemental]  200 mg Oral BID  carvedilol (COREG) tablet 6.25 mg  6.25 mg Oral BID WITH MEALS  cholecalciferol (VITAMIN D3) tablet 2,000 Units  2,000 Units Oral DAILY  escitalopram oxalate (LEXAPRO) tablet 20 mg  20 mg Oral DAILY  ferrous gluconate 324 mg (38 mg iron) tablet 1 Tab  1 Tab Oral BID  
  fluticasone (FLONASE) 50 mcg/actuation nasal spray 2 Spray  2 Spray Both Nostrils DAILY PRN  
 furosemide (LASIX) tablet 40 mg  40 mg Oral DAILY  isosorbide mononitrate ER (IMDUR) tablet 30 mg  30 mg Oral DAILY  loratadine (CLARITIN) tablet 10 mg  10 mg Oral DAILY PRN  
 multivitamin, tx-iron-ca-min (THERA-M w/ IRON) tablet 1 Tab  1 Tab Oral DAILY  sacubitril-valsartan (ENTRESTO) 24-26 mg tablet 1 Tab  1 Tab Oral BID  simvastatin (ZOCOR) tablet 20 mg  20 mg Oral QHS  spironolactone (ALDACTONE) tablet 25 mg  25 mg Oral BID  tiotropium (SPIRIVA) inhalation capsule 18 mcg  18 mcg Inhalation DAILY  budesonide (PULMICORT) 500 mcg/2 ml nebulizer suspension  500 mcg Nebulization BID RT And  
 albuterol (PROVENTIL VENTOLIN) nebulizer solution 2.5 mg  2.5 mg Nebulization Q6HWA RT  
 influenza vaccine 2018- (6 mos+)(PF) (FLUARIX QUAD/FLULAVAL QUAD) injection 0.5 mL  0.5 mL IntraMUSCular PRIOR TO DISCHARGE  vancomycin (VANCOCIN) 1,000 mg in 0.9% sodium chloride (MBP/ADV) 250 mL  1,000 mg IntraVENous Q24H  
 alcohol 62% (NOZIN) nasal  1 Ampule  1 Ampule Topical Q12H Review of Systems A comprehensive review of systems was negative except for that written in the HPI. Objective:  
 
Visit Vitals  BP 97/52 (BP 1 Location: Right arm, BP Patient Position: Sitting)  Pulse 65  Temp 97.9 °F (36.6 °C)  Resp 18  Ht 5' 2\" (1.575 m)  Wt 94.8 kg (209 lb)  SpO2 96%  BMI 38.23 kg/m2 O2 Flow Rate (L/min): 3 l/min O2 Device: Nasal cannula Temp (24hrs), Av.2 °F (36.8 °C), Min:97.7 °F (36.5 °C), Max:98.5 °F (36.9 °C) 10/08 0701 - 10/08 1900 In: -  
Out: [id number] [[id number]] 10/06 1901 - 10/08 0700 In: -  
Out: Serenity Conner General appearance: Oriented and alert, cooperative, very pleasant, denies pain. Morbid obesity.   
Head: Normocephalic, without obvious abnormality, atraumatic Eyes: conjunctivae/corneas clear.  PERRL 
 Neck: supple, symmetrical, trachea midline, no JVD Lungs: clear to auscultation bilaterally Heart: regular rate and rhythm, S1, S2 normal, no murmur, click, rub or gallop Abdomen: soft, non-tender. Bowel sounds normal. No masses,  no organomegaly Extremities: Dry, intact dressing to left knee. Markings from yesterday visible above dressing with marked reduction in swelling, heat and firmness. All other extremities normal, atraumatic, no cyanosis or edema Skin: Skin color, texture, turgor normal. No rashes or lesions Neurologic: Grossly normal 
 
Additional comments: Notes,orders, test results, vitals reviewed Data Review Recent Results (from the past 24 hour(s)) PLEASE READ & DOCUMENT PPD TEST IN 24 HRS Collection Time: 10/07/18  5:48 PM  
Result Value Ref Range PPD negative Negative  
 mm Induration 0 mm GLUCOSE, POC Collection Time: 10/07/18  8:56 PM  
Result Value Ref Range Glucose (POC) 105 (H) 65 - 100 mg/dL PROTHROMBIN TIME + INR Collection Time: 10/08/18  4:26 AM  
Result Value Ref Range Prothrombin time 25.8 (H) 11.5 - 14.5 sec INR 2.5 PTT Collection Time: 10/08/18  4:26 AM  
Result Value Ref Range aPTT 55.0 (H) 23.2 - 35.3 SEC  
LIPASE Collection Time: 10/08/18  4:26 AM  
Result Value Ref Range Lipase 157 73 - 585 U/L  
METABOLIC PANEL, COMPREHENSIVE Collection Time: 10/08/18  4:26 AM  
Result Value Ref Range Sodium 140 136 - 145 mmol/L Potassium 4.2 3.5 - 5.1 mmol/L Chloride 100 98 - 107 mmol/L  
 CO2 36 (H) 21 - 32 mmol/L Anion gap 4 (L) 7 - 16 mmol/L Glucose 97 65 - 100 mg/dL BUN 38 (H) 8 - 23 MG/DL Creatinine 1.81 (H) 0.6 - 1.0 MG/DL  
 GFR est AA 36 (L) >60 ml/min/1.73m2 GFR est non-AA 29 (L) >60 ml/min/1.73m2 Calcium 9.4 8.3 - 10.4 MG/DL Bilirubin, total 0.4 0.2 - 1.1 MG/DL  
 ALT (SGPT) 11 (L) 12 - 65 U/L  
 AST (SGOT) 11 (L) 15 - 37 U/L Alk.  phosphatase 47 (L) 50 - 136 U/L  
 Protein, total 6.8 6.3 - 8.2 g/dL Albumin 2.8 (L) 3.2 - 4.6 g/dL Globulin 4.0 (H) 2.3 - 3.5 g/dL A-G Ratio 0.7 (L) 1.2 - 3.5    
CBC W/O DIFF Collection Time: 10/08/18  4:26 AM  
Result Value Ref Range WBC 7.7 4.3 - 11.1 K/uL  
 RBC 3.32 (L) 4.05 - 5.2 M/uL HGB 9.5 (L) 11.7 - 15.4 g/dL HCT 31.5 (L) 35.8 - 46.3 % MCV 94.9 79.6 - 97.8 FL  
 MCH 28.6 26.1 - 32.9 PG  
 MCHC 30.2 (L) 31.4 - 35.0 g/dL  
 RDW 14.6 % PLATELET 954 686 - 634 K/uL MPV 10.2 9.4 - 12.3 FL ABSOLUTE NRBC 0.00 0.0 - 0.2 K/uL MAGNESIUM Collection Time: 10/08/18  4:26 AM  
Result Value Ref Range Magnesium 2.0 1.8 - 2.4 mg/dL GLUCOSE, POC Collection Time: 10/08/18  5:52 AM  
Result Value Ref Range Glucose (POC) 105 (H) 65 - 100 mg/dL GLUCOSE, POC Collection Time: 10/08/18 11:16 AM  
Result Value Ref Range Glucose (POC) 171 (H) 65 - 100 mg/dL GLUCOSE, POC Collection Time: 10/08/18  4:22 PM  
Result Value Ref Range Glucose (POC) 137 (H) 65 - 100 mg/dL Special Requests: SWAB  
MISC. WOUND  
KNEE  
 DONE 10/6/18    Preliminary GRAM STAIN      Preliminary MODERATE GRAM NEGATIVE RODS  
GRAM STAIN      Preliminary FEW GRAM POSITIVE COCCI  
GRAM STAIN 0 TO 2 WBC'S PER OIF    Preliminary Culture result:  (A)    Preliminary MODERATE PROTEUS MIRABILIS Culture result:      Preliminary CULTURE IN PROGRESS,FURTHER UPDATES TO FOLLOW  
 
  
XRAY LEFT KNEE:  2 images are presented, soft tissue edema and irregularity is seen along the inferior patellar tendon anteriorly but there is no joint effusion seen and no bony injury identified. IMPRESSION: Soft tissue injury seen but no bony injury noted. If osteomyelitis is suspected MRI should be considered. Assessment/Plan:  
Cellulitis and Abscess left knee 
                      Wound Culture with Moderate Proteus Mirabilis                       Begun on vancomycin and ceftriaxone 8/6, D/C 8/10 Rocephin begun 8/10 after culture and sensitivity resulted Consult ID tomorrow for appropriate antibiotic in diabetic patient with   CAD, poor vasculature, coagulopathy, CKD, S/P AVR, and MDS 
S/P I+D hematoma left knee after traumatic injury 9/11 with non healing wound since 
                      Surgery In with debridement 10/7 with plans for possible wound vac 
                      Wound care on board 
                      Monitor for drainage and blood loss MDS (myelodysplastic syndrome) on procrit and Fe, transfusions                       OP Hematology following 
                      Monitor Hgb Extensive CAD with stent, MI, Cardiomyopathy, Chronic combined systolic and diastolic heart failure with EF: 25-30%                       Stable at present 
[facility] IVF ICD S/P AVR, mechanical 
Hypertension 
                      Continue home meds Chronic anticoagulation on Coumadin with current INR therapeutic (2-3 goal), recent dose decrease to 2.5 mg daily                       Continue home dose 
                      Pharmacy to dose please 
                      Monitor INR daily CKD stage 3                       Monitor daily COPD on home oxygen 3 liters                       RT consult 
                      Aerosols                       Continue oxygen 3 liters REJI on CPAP 
                      Family to bring CPAP 
NIDDM, diet controlled, A1C: 5.7 
                      Routine SSI Pulmonary hypertension Physical debility Morbid obesity Chronic respiratory failure with hypoxia                       Continue oxygen and aerosols Diarrhea x 4 days                       C dif culture                       Stool for: Culture, Heme, WBC pending Elevated lipase: etiology unclear without history of pancreatitis Repeat normal, suspect testing fluke Daily labs Care Plan discussed with: Patient/Family and Nurse Signed By: Jonathan Fritz NP October 8, 2018

## 2018-10-09 VITALS
OXYGEN SATURATION: 98 % | HEART RATE: 90 BPM | TEMPERATURE: 98 F | DIASTOLIC BLOOD PRESSURE: 68 MMHG | RESPIRATION RATE: 17 BRPM | SYSTOLIC BLOOD PRESSURE: 118 MMHG

## 2018-10-09 LAB
ANION GAP SERPL CALC-SCNC: 5 MMOL/L (ref 7–16)
APTT PPP: 56.2 SEC (ref 23.2–35.3)
BACTERIA SPEC CULT: ABNORMAL
BUN SERPL-MCNC: 40 MG/DL (ref 8–23)
CALCIUM SERPL-MCNC: 8.8 MG/DL (ref 8.3–10.4)
CHLORIDE SERPL-SCNC: 101 MMOL/L (ref 98–107)
CO2 SERPL-SCNC: 34 MMOL/L (ref 21–32)
CREAT SERPL-MCNC: 1.84 MG/DL (ref 0.6–1)
ERYTHROCYTE [DISTWIDTH] IN BLOOD BY AUTOMATED COUNT: 14.6 %
GLUCOSE BLD STRIP.AUTO-MCNC: 121 MG/DL (ref 65–100)
GLUCOSE BLD STRIP.AUTO-MCNC: 121 MG/DL (ref 65–100)
GLUCOSE BLD STRIP.AUTO-MCNC: 234 MG/DL (ref 65–100)
GLUCOSE BLD STRIP.AUTO-MCNC: 95 MG/DL (ref 65–100)
GLUCOSE SERPL-MCNC: 80 MG/DL (ref 65–100)
GRAM STN SPEC: ABNORMAL
HCT VFR BLD AUTO: 29.7 % (ref 35.8–46.3)
HGB BLD-MCNC: 9.1 G/DL (ref 11.7–15.4)
INR PPP: 2.3
MCH RBC QN AUTO: 28.7 PG (ref 26.1–32.9)
MCHC RBC AUTO-ENTMCNC: 30.6 G/DL (ref 31.4–35)
MCV RBC AUTO: 93.7 FL (ref 79.6–97.8)
NRBC # BLD: 0 K/UL (ref 0–0.2)
PLATELET # BLD AUTO: 177 K/UL (ref 150–450)
PMV BLD AUTO: 10.7 FL (ref 9.4–12.3)
POTASSIUM SERPL-SCNC: 4.1 MMOL/L (ref 3.5–5.1)
PROTHROMBIN TIME: 24.3 SEC (ref 11.5–14.5)
RBC # BLD AUTO: 3.17 M/UL (ref 4.05–5.2)
SERVICE CMNT-IMP: ABNORMAL
SODIUM SERPL-SCNC: 140 MMOL/L (ref 136–145)
WBC # BLD AUTO: 7.1 K/UL (ref 4.3–11.1)

## 2018-10-09 PROCEDURE — 85610 PROTHROMBIN TIME: CPT

## 2018-10-09 PROCEDURE — 77030020263 HC SOL INJ SOD CL0.9% LFCR 1000ML

## 2018-10-09 PROCEDURE — 97110 THERAPEUTIC EXERCISES: CPT

## 2018-10-09 PROCEDURE — 97530 THERAPEUTIC ACTIVITIES: CPT

## 2018-10-09 PROCEDURE — 74011250636 HC RX REV CODE- 250/636: Performed by: NURSE PRACTITIONER

## 2018-10-09 PROCEDURE — 74011000250 HC RX REV CODE- 250: Performed by: INTERNAL MEDICINE

## 2018-10-09 PROCEDURE — 74011636637 HC RX REV CODE- 636/637: Performed by: NURSE PRACTITIONER

## 2018-10-09 PROCEDURE — 74011250637 HC RX REV CODE- 250/637: Performed by: INTERNAL MEDICINE

## 2018-10-09 PROCEDURE — 65270000029 HC RM PRIVATE

## 2018-10-09 PROCEDURE — 82962 GLUCOSE BLOOD TEST: CPT

## 2018-10-09 PROCEDURE — 80048 BASIC METABOLIC PNL TOTAL CA: CPT

## 2018-10-09 PROCEDURE — 85027 COMPLETE CBC AUTOMATED: CPT

## 2018-10-09 PROCEDURE — 2W1RX6Z COMPRESSION OF LEFT LOWER LEG USING PRESSURE DRESSING: ICD-10-PCS | Performed by: SURGERY

## 2018-10-09 PROCEDURE — 97605 NEG PRS WND THER DME<=50SQCM: CPT

## 2018-10-09 PROCEDURE — 74011250637 HC RX REV CODE- 250/637: Performed by: NURSE PRACTITIONER

## 2018-10-09 PROCEDURE — 94640 AIRWAY INHALATION TREATMENT: CPT

## 2018-10-09 PROCEDURE — 94760 N-INVAS EAR/PLS OXIMETRY 1: CPT

## 2018-10-09 PROCEDURE — 3331090001 HH PPS REVENUE CREDIT

## 2018-10-09 PROCEDURE — 77030019952 HC CANSTR VAC ASST KCON -B

## 2018-10-09 PROCEDURE — 85730 THROMBOPLASTIN TIME PARTIAL: CPT

## 2018-10-09 PROCEDURE — 74750000023 HC WOUND THERAPY

## 2018-10-09 PROCEDURE — 36415 COLL VENOUS BLD VENIPUNCTURE: CPT

## 2018-10-09 PROCEDURE — 3331090002 HH PPS REVENUE DEBIT

## 2018-10-09 PROCEDURE — 77030019934 HC DRSG VAC ASST KCON -B

## 2018-10-09 PROCEDURE — 97112 NEUROMUSCULAR REEDUCATION: CPT

## 2018-10-09 RX ORDER — CLINDAMYCIN HYDROCHLORIDE 150 MG/1
300 CAPSULE ORAL 3 TIMES DAILY
Status: DISCONTINUED | OUTPATIENT
Start: 2018-10-09 | End: 2018-10-10 | Stop reason: HOSPADM

## 2018-10-09 RX ORDER — CIPROFLOXACIN 500 MG/1
500 TABLET ORAL EVERY 12 HOURS
Status: DISCONTINUED | OUTPATIENT
Start: 2018-10-09 | End: 2018-10-10 | Stop reason: HOSPADM

## 2018-10-09 RX ADMIN — CARVEDILOL 6.25 MG: 6.25 TABLET, FILM COATED ORAL at 08:15

## 2018-10-09 RX ADMIN — TRAMADOL HYDROCHLORIDE 100 MG: 50 TABLET, FILM COATED ORAL at 08:13

## 2018-10-09 RX ADMIN — ISOSORBIDE MONONITRATE 30 MG: 30 TABLET, EXTENDED RELEASE ORAL at 08:10

## 2018-10-09 RX ADMIN — Medication 10 ML: at 21:29

## 2018-10-09 RX ADMIN — BUDESONIDE 500 MCG: 0.5 INHALANT RESPIRATORY (INHALATION) at 07:20

## 2018-10-09 RX ADMIN — CIPROFLOXACIN 500 MG: 500 TABLET, FILM COATED ORAL at 10:46

## 2018-10-09 RX ADMIN — TRAZODONE HYDROCHLORIDE 100 MG: 50 TABLET ORAL at 21:29

## 2018-10-09 RX ADMIN — ESCITALOPRAM OXALATE 20 MG: 10 TABLET ORAL at 08:09

## 2018-10-09 RX ADMIN — CLINDAMYCIN HYDROCHLORIDE 300 MG: 150 CAPSULE ORAL at 17:42

## 2018-10-09 RX ADMIN — CIPROFLOXACIN 500 MG: 500 TABLET, FILM COATED ORAL at 21:28

## 2018-10-09 RX ADMIN — FERROUS GLUCONATE 1 TABLET: 324 TABLET ORAL at 17:44

## 2018-10-09 RX ADMIN — SODIUM CHLORIDE 75 ML/HR: 900 INJECTION, SOLUTION INTRAVENOUS at 08:07

## 2018-10-09 RX ADMIN — ALBUTEROL SULFATE 2.5 MG: 2.5 SOLUTION RESPIRATORY (INHALATION) at 13:42

## 2018-10-09 RX ADMIN — SPIRONOLACTONE 25 MG: 25 TABLET ORAL at 17:44

## 2018-10-09 RX ADMIN — SACUBITRIL AND VALSARTAN 1 TABLET: 24; 26 TABLET, FILM COATED ORAL at 08:12

## 2018-10-09 RX ADMIN — FUROSEMIDE 40 MG: 40 TABLET ORAL at 08:11

## 2018-10-09 RX ADMIN — FERROUS GLUCONATE 1 TABLET: 324 TABLET ORAL at 08:14

## 2018-10-09 RX ADMIN — ASPIRIN 81 MG: 81 TABLET, COATED ORAL at 08:16

## 2018-10-09 RX ADMIN — Medication 1 AMPULE: at 21:29

## 2018-10-09 RX ADMIN — CARVEDILOL 6.25 MG: 6.25 TABLET, FILM COATED ORAL at 17:44

## 2018-10-09 RX ADMIN — MULTIPLE VITAMINS W/ MINERALS TAB 1 TABLET: TAB at 08:12

## 2018-10-09 RX ADMIN — CLINDAMYCIN HYDROCHLORIDE 300 MG: 150 CAPSULE ORAL at 10:45

## 2018-10-09 RX ADMIN — WARFARIN SODIUM 2.5 MG: 2.5 TABLET ORAL at 17:44

## 2018-10-09 RX ADMIN — VITAMIN D, TAB 1000IU (100/BT) 2000 UNITS: 25 TAB at 08:12

## 2018-10-09 RX ADMIN — Medication 5 ML: at 06:35

## 2018-10-09 RX ADMIN — ALBUTEROL SULFATE 2.5 MG: 2.5 SOLUTION RESPIRATORY (INHALATION) at 07:20

## 2018-10-09 RX ADMIN — INSULIN LISPRO 4 UNITS: 100 INJECTION, SOLUTION INTRAVENOUS; SUBCUTANEOUS at 17:42

## 2018-10-09 RX ADMIN — Medication 10 ML: at 14:21

## 2018-10-09 RX ADMIN — SIMVASTATIN 20 MG: 20 TABLET, FILM COATED ORAL at 21:29

## 2018-10-09 RX ADMIN — CLINDAMYCIN HYDROCHLORIDE 300 MG: 150 CAPSULE ORAL at 21:29

## 2018-10-09 RX ADMIN — TIOTROPIUM BROMIDE 18 MCG: 18 CAPSULE ORAL; RESPIRATORY (INHALATION) at 07:29

## 2018-10-09 RX ADMIN — ALBUTEROL SULFATE 2.5 MG: 2.5 SOLUTION RESPIRATORY (INHALATION) at 19:28

## 2018-10-09 RX ADMIN — DOCUSATE SODIUM 100 MG: 100 CAPSULE, LIQUID FILLED ORAL at 08:15

## 2018-10-09 RX ADMIN — OXYCODONE HYDROCHLORIDE AND ACETAMINOPHEN 500 MG: 500 TABLET ORAL at 08:13

## 2018-10-09 RX ADMIN — DOCUSATE SODIUM 100 MG: 100 CAPSULE, LIQUID FILLED ORAL at 17:43

## 2018-10-09 RX ADMIN — BUDESONIDE 500 MCG: 0.5 INHALANT RESPIRATORY (INHALATION) at 19:28

## 2018-10-09 RX ADMIN — SPIRONOLACTONE 25 MG: 25 TABLET ORAL at 08:14

## 2018-10-09 RX ADMIN — SACUBITRIL AND VALSARTAN 1 TABLET: 24; 26 TABLET, FILM COATED ORAL at 21:29

## 2018-10-09 RX ADMIN — Medication 1 AMPULE: at 08:16

## 2018-10-09 NOTE — CONSULTS
Infectious Disease Consult Today's Date: 10/9/2018 Admit Date: 10/6/2018 Impression: · Soft tissue infection, pre-patellar area, with surrounding cellulitis, superficial culture with Proteus mirabilis · Diabetes mellitus · Myelodysplastic syndrome Plan: ·  Ciprofloxacin 500 mg po bid · Clindamycin 300 mg po tid · Estimated duration approx. 10-14 days depending on rate of healing · Followup per [facility]. Please call if needed. Anti-infectives:  
· Rocephin Subjective:  
Date of Consultation:  October 9, 2018 Referring Physician: Maxi Cavanaugh NP Patient is a 77 y.o. female with history of MDS, DM, COPD, CHF developed a blister in L prepatellar area that was drained (? Hematoma) but has been slow to heal and has developed surrounding erythema. Now admitted for wound care, vac placement. Wound debrided 10/7 no bony on tendon involvement. Wound culture has grown Proteus mirabilis. Patient Active Problem List  
Diagnosis Code  Iron deficiency anemia due to chronic blood loss D50.0  MDS (myelodysplastic syndrome) ([facility]) D46.9  CAD (coronary artery disease) I25.10  Chronic combined systolic and diastolic heart failure ([facility]) I50.42  
 Essential hypertension, benign I10  
 Anticoagulated on Coumadin Z51.81, Z79.01  
 CKD (chronic kidney disease) stage 3, GFR 30-59 ml/min ([facility]) N18.3  COPD (chronic obstructive pulmonary disease) ([facility]) J44.9  REJI (obstructive sleep apnea)-cpap G47.33  
 Diabetes mellitus type 2, diet-controlled ([facility]) E11.9  
 ICD (implantable cardioverter-defibrillator) in place Z95.810  
 Osteopenia M85.80  
 HLD (hyperlipidemia) E78.5  Osteoarthritis M19.90  
 S/P AVR (aortic valve replacement) Z95.2  Cardiomyopathy ([facility] [address] I42.9  Pulmonary hypertension ([facility]) I27.20  Type 2 diabetes mellitus with nephropathy ([facility]) E11.21  
 Dysthymia F34.1  Long term (current) use of anticoagulants Z79.01  
  Subdural hematoma ([facility] [address] D67.5I6H  Closed nondisplaced fracture of shaft of fifth metacarpal bone of left hand S62.357A  Physical debility R53.81  
 Obesity, morbid ([address] E66.01  
 Encounter for immunization Z23  Anemia D64.9  Chronic respiratory failure with hypoxia ([facility]) J96.11  
 Traumatic hematoma of left knee S80. 02XA  Debility R53.81  
 Abscess L02.91  
 History of incision and drainage Z98.890  Open wound of left lower extremity with complication U24.569D Past Medical History:  
Diagnosis Date  Anticoagulated on Coumadin 7/9/2013 S/P AVR  CAD (coronary artery disease) 1/20/2013 5/8/14 PCI LAD with stent placed  Cardiomyopathy ([facility] [address]  CHF (congestive heart failure) ([facility] [address] 2/17/2017  CKD (chronic kidney disease) stage 3, GFR 30-59 ml/min ([facility]) 7/10/2013  COPD (chronic obstructive pulmonary disease) ([facility] [address] 4/2/2014  Essential hypertension, benign 1/20/2013  HLD (hyperlipidemia)  ICD (implantable cardioverter-defibrillator) in place 10/2/2014 Biotronik single-chamber ICD implantation 10/20/14  Iron deficiency anemia due to chronic blood loss 7/29/2009  MDS (myelodysplastic syndrome) ([facility] [address] 12/17/2011 Procrit started in August, 2011 12/18/11 Procrit weekly and Iron stores. 5-12 12-13-12 good response to 3 weekly procrit did not need it last time  3-7-13 Pt doing well. Just wanted a \"check-up. \" Responding to Procrit every three weeks. 8-29-13 patient has missed some injections on recently restarted hemoglobin only issue , takes oral iron iron stores each time  REJI (obstructive sleep apnea)-cpap 4/2/2014  Osteoarthritis  Osteopenia  Quadrantanopia  Visual complaint 12/14/2015 Family History Problem Relation Age of Onset  Heart Disease Mother CHF  Hypertension Mother  Kidney Disease Mother  Heart Disease Father CHF  Lung Disease Father  Diabetes Father  Cancer Father prostate  Hypertension Father  Heart Attack Father  Heart Disease Maternal Aunt  Diabetes Brother  Coronary Artery Disease Other  Breast Cancer Neg Hx Social History Substance Use Topics  Smoking status: Former Smoker Packs/day: 0.25 Years: 42.00 Types: Cigarettes Start date: 10/1/1956 Quit date: 2014  Smokeless tobacco: Never Used  Alcohol use No  
 
Past Surgical History:  
Procedure Laterality Date [address]    HX AORTIC VALVE REPLACEMENT  ,   
 mechanical valve   HX  SECTION    
 HX CORONARY STENT PLACEMENT  May, 2014 STEMI with Stent placement.  HX ENDOSCOPY  2009 EGD [address] CATHETERIZATION    HX PACEMAKER  10/2/2014 Biotronik ICD  HX TUBAL LIGATION    
 IR BX BONE MARROW DIAGNOSTIC  2011 Prior to Admission medications Medication Sig Start Date End Date Taking? Authorizing Provider  
warfarin (COUMADIN) 2.5 mg tablet Take 2.5 mg by mouth nightly. Yes Historical Provider  
docusate sodium (COLACE) 50 mg capsule Take 50 mg by mouth daily. Yes Historical Provider  
carvedilol (COREG) 6.25 mg tablet Take 1 Tab by mouth two (2) times daily (with meals). Patient taking differently: Take 3.125 mg by mouth two (2) times daily (with meals). 18  Yes Felisha Shepherd MD  
furosemide (LASIX) 40 mg tablet Take 1 Tab by mouth daily. Patient taking differently: Take 20 mg by mouth two (2) times a day. 18  Yes Felisha Shepherd MD  
traZODone (DESYREL) 50 mg tablet Take 0.5-1 Tabs by mouth nightly. 18  Yes Felisha Shepherd MD  
spironolactone (ALDACTONE) 25 mg tablet Take 1 Tab by mouth two (2) times a day.  Pill bottles states 3.5 daily but patient is taking one in am and one in pm 18  Yes Radha Jane MD  
simvastatin (ZOCOR) 20 mg tablet TAKE 1 TABLET BY MOUTH EVERY DAY 18  Yes Radha Jane MD  
 traMADol (ULTRAM) 50 mg tablet TAKE 1 TABLET BY MOUTH EVERY 4 HOURS AS NEEDED Patient taking differently: Take 50 mg by mouth every six (6) hours as needed for Pain. 8/21/18  Yes Liang Estevez MD  
escitalopram oxalate (LEXAPRO) 20 mg tablet TAKE 1 TAB BY MOUTH DAILY. INDICATIONS: ANXIETY WITH DEPRESSION 8/21/18  Yes Liang Estevez MD  
multivit-minerals/folic acid (ADULT ONE DAILY MULTIVITAMIN PO) Take 1 Tab by mouth daily. Yes Historical Provider  
ascorbic acid, vitamin C, (VITAMIN C) 500 mg tablet Take 500 mg by mouth daily. Yes Historical Provider  
aspirin delayed-release 81 mg tablet Take 81 mg by mouth daily. Yes Historical Provider  
ferrous gluconate 324 mg (38 mg iron) tablet TAKE 1 TAB BY MOUTH DAILY (BEFORE BREAKFAST). INDICATIONS: IRON DEFICIENCY ANEMIA Patient taking differently: 325 mg two (2) times daily (with meals). TAKE 1 TAB BY MOUTH DAILY (BEFORE BREAKFAST). INDICATIONS: IRON DEFICIENCY ANEMIA Takes twice a day 4/5/18  Yes Liang Estevez MD  
fluticasone (FLONASE) 50 mcg/actuation nasal spray 2 Sprays by Both Nostrils route daily as needed. 3/7/18  Yes Liang Estevez MD  
loratadine (CLARITIN) 10 mg tablet TAKE 1 TAB BY MOUTH DAILY. Patient taking differently: TAKE 1 TAB BY MOUTH DAILY PRN 1/2/18  Yes CAROLINA Hook  
mometasone-formoterol (DULERA) 200-5 mcg/actuation HFA inhaler 2 puffs bid, rinse mouth after use. Patient taking differently: Take 2 Puffs by inhalation two (2) times daily as needed. 8/30/17  Yes Vasile Mccloud NP  
albuterol (PROVENTIL VENTOLIN) 2.5 mg /3 mL (0.083 %) nebulizer solution 3 mL by Nebulization route every four (4) hours as needed for Wheezing. 8/30/17  Yes Vasile Mccloud NP  
albuterol (VENTOLIN HFA) 90 mcg/actuation inhaler 2 puffs qid prn Patient taking differently: Take 2 Puffs by inhalation every six (6) hours as needed for Wheezing or Shortness of Breath.  8/30/17  Yes Vasile Mccloud NP  
 cholecalciferol, VITAMIN D3, (VITAMIN D3) 5,000 unit tab tablet Take 1 Tab by mouth daily. Patient taking differently: Take 2,000 Units by mouth daily. 6/15/16  Yes Leida Guerrero MD  
OXYGEN-AIR DELIVERY SYSTEMS 3 L by Nasal route continuous. Yes Historical Provider  
sacubitril-valsartan (ENTRESTO) 24 mg/26 mg tablet Take 1 Tab by mouth two (2) times a day. INDICATIONS: CHRONIC HEART FAILURE Patient taking differently: Take 1 Tab by mouth daily. INDICATIONS: CHRONIC HEART FAILURE 18   Emmie Umaña MD  
acetaminophen (TYLENOL) 325 mg tablet Take 650 mg by mouth every four (4) hours as needed for Pain. Historical Provider  
nitroglycerin (NITROSTAT) 0.4 mg SL tablet 0.4 mg by SubLINGual route every five (5) minutes as needed for Chest Pain. Historical Provider Allergies Allergen Reactions  Sulfa (Sulfonamide Antibiotics) Hives  Aspirin Nausea Only Cannot take uncoated aspirin Review of Systems:  A comprehensive review of systems was negative except for that written in the History of Present Illness. Objective:  
 
Visit Vitals  /59  Pulse 74  Temp 98.2 °F (36.8 °C)  Resp 17  Ht 5' 2\" (1.575 m)  Wt 94.8 kg (209 lb)  SpO2 100%  BMI 38.23 kg/m2 Temp (24hrs), Av.3 °F (36.8 °C), Min:97.9 °F (36.6 °C), Max:98.5 °F (36.9 °C) Lines:  Peripheral IV:   
   
 
 
Physical Exam:   
General:  Alert, cooperative, well noursished, well developed, appears stated age Eyes:  Sclera anicteric. Pupils equally round and reactive to light. Mouth/Throat: Mucous membranes normal, oral pharynx clear Neck: Supple Lungs:   Clear to auscultation bilaterally, good effort CV:  Regular rate and rhythm,no murmur, click, rub or gallop Abdomen:   Soft, non-tender. bowel sounds normal. non-distended Extremities: Vac in place L lower leg Skin: Mild erythema surrounding vac, no crepitance, fluctuence, old abrasion L lower leg Musculoskeletal: No swelling or deformity Lines/Devices:  Intact, no erythema, drainage or tenderness Psych: Alert and oriented, normal mood affect given the setting Data Review: CBC: 
Recent Labs 10/09/18 
 3258  10/08/18 
 5124  10/07/18 
 2797  10/06/18 
 1525 WBC  7.1  7.7  8.8  11.6* GRANS   --    --   51  67 MONOS   --    --   11  9 EOS   --    --   2  1 ANEU   --    --   4.4  7.7 ABL   --    --   3.1  2.7 HGB  9.1*  9.5*  9.0*  10.9* HCT  29.7*  31.5*  29.8*  35.5* PLT  177  187  199  232 BMP: 
Recent Labs 10/09/18 
 9482  10/08/18 
 5495  10/07/18 
 0662 CREA  1.84*  1.81*  1.50* BUN  40*  38*  38* NA  140  140  142  
K  4.1  4.2  4.4  
CL  101  100  100 CO2  34*  36*  39* AGAP  5*  4*  3*  
GLU  80  97  58* LFTS: 
Recent Labs 10/08/18 
 0426  10/06/18 
 1525 TBILI  0.4  0.2 ALT  11*  13 SGOT  11*  13* AP  47*  63  
TP  6.8  8.1 ALB  2.8*  3.5 Microbiology:  
 
All Micro Results Procedure Component Value Units Date/Time CULTURE, [place] STAIN [[id number]]  (Abnormal)  (Susceptibility) Collected:  10/06/18 1647 Order Status:  Completed Specimen:  Wound from Knee  Updated:  10/08/18 0981 Special Requests: --     
  SWAB 
MISC. WOUND 
KNEE 
  
  GRAM STAIN      
  MODERATE GRAM NEGATIVE RODS  
   FEW GRAM POSITIVE COCCI     
   0 TO 2 WBC'S PER OIF Culture result: MODERATE PROTEUS MIRABILIS (A) CULTURE IN PROGRESS,FURTHER UPDATES TO FOLLOW Jes Ramirez [[id number]] Collected:  10/06/18 1800 Order Status:  Canceled Specimen:  Stool C. DIFFICILE AG & TOXIN A/B [[id number]] Order Status:  Canceled Specimen:  Stool Imaging:  
X ray L leg Signed By: Omi Limon MD   
 October 9, 2018

## 2018-10-09 NOTE — PROGRESS NOTES
H&P/Consult Note/Progress Note/Office Note:  
Socorro Dangelo  MRN: [id number]  LRK:2/0/0299  Age:68 y.o. General Surgery Consult ordered by: Celestine Ulrich NP Reason for General Surgery Consult: Eval wound to Left knee - S/P I+D hematoma left knee after traumatic injury 9/11 with non healing wound since As previously noted HPI: Socorro Dangelo is a 77 y.o. female \"with extensive medical history as noted below on chronic coumadin who initially presented to ER 9/7 after fall in bathroom 8/28 where she landed directly on her left knee with complaints of dizziness and fatigue and found to have Hgb of 6.7. She was transfused to Hgb of 9.6. She also was found with a large hematoma/bullae over her left knee and INR of 4.3. She underwent an I+D per Dr Leisa Wheatley on 9/11 with a large amount of serosanguinous fluid evacuated. She has been followed by home health PT, OT and wound care since discharge from rehab on ninth floor 9/11-9/21. She has done well except the wound has not closed. Two days ago, [facility] RN and daughter who is an RN noted mild erythema and heat surrounding upper half of wound. Open area approx 5-6 cm in circumference. No purulent drainage, oozing small amounts of dark blood with slightly foul odor. Full ROM to knee. All distal circulation, motion and sensation WNL for patient. No distal edema, mild local edema. Multiple old scars from prior lower extremity wounds. Patient denies pain to area, fever, systemic symptoms. She does not appear septic and lactic acid is 1.8. WBC is 11.6 without shift. Wound cultured in ER prior to administration of antibiotics. Begun on vancomycin and ceftriaxone until cultures resulted. Large, attentive family at bedside. Creatinine 1.48 on admission with baseline of 1.2. Of note, patient reports diarrhea x 4 days without use of antibiotics, additional GI symptoms. \"   
 
 
10/7/18: Pt sitting up on side of the bed just finishing breakfast. No complaints. AF, NAD. WBC 8.8, Pt taking Coumadin 2.5mg qhs. INR 3.0. 
10/8/18: POD1 s/p left knee debridement; awake in bed, no complaints. AF, VSS. Cx growing P. Mirabilis. 10/9/18: POD2 s/p left knee debridement; awake in chair, no complaints. AF, VSS. Cx growing P. Mirabilis. Vac placed today and functioning without leak Past Medical History:  
Diagnosis Date  Anticoagulated on Coumadin 2013 S/P AVR  CAD (coronary artery disease) 2013 PCI LAD with stent placed  Cardiomyopathy ([address]  CHF (congestive heart failure) ([address] 2017  CKD (chronic kidney disease) stage 3, GFR 30-59 ml/min ([facility]) 7/10/2013  COPD (chronic obstructive pulmonary disease) ([address] 2014  Essential hypertension, benign 2013  HLD (hyperlipidemia)  ICD (implantable cardioverter-defibrillator) in place 10/2/2014 Biotronik single-chamber ICD implantation 10/20/14  Iron deficiency anemia due to chronic blood loss 2009  MDS (myelodysplastic syndrome) ([address] 2011 Procrit started in 11 Procrit weekly and Iron stores. 12 good response to 3 weekly procrit did not need it last time  3-7-13 Pt doing well. Just wanted a \"check-up. \" Responding to Procrit every three weeks. 13 patient has missed some injections on recently restarted hemoglobin only issue , takes oral iron iron stores each time  REJI (obstructive sleep apnea)-cpap 2014  Osteoarthritis  Osteopenia  Quadrantanopia  Visual complaint 2015 Past Surgical History:  
Procedure Laterality Date [address]    HX AORTIC VALVE REPLACEMENT  ,   
 mechanical valve   HX  SECTION    
 HX CORONARY STENT PLACEMENT  May, 2014 STEMI with Stent placement.  HX ENDOSCOPY  2009 EGD [address] CATHETERIZATION    HX PACEMAKER  10/2/2014 Biotronik ICD  HX TUBAL LIGATION    
  IR BX BONE MARROW DIAGNOSTIC  7/2011 Current Facility-Administered Medications Medication Dose Route Frequency  ciprofloxacin HCl (CIPRO) tablet 500 mg  500 mg Oral Q12H  clindamycin (CLEOCIN) capsule 300 mg  300 mg Oral TID  warfarin (COUMADIN) tablet 2.5 mg  2.5 mg Oral QHS  docusate sodium (COLACE) capsule 100 mg  100 mg Oral BID  
 0.9% sodium chloride infusion  75 mL/hr IntraVENous CONTINUOUS  
 traZODone (DESYREL) tablet 100 mg  100 mg Oral QHS  sodium chloride (NS) flush 5-10 mL  5-10 mL IntraVENous Q8H  
 sodium chloride (NS) flush 5-10 mL  5-10 mL IntraVENous PRN  
 acetaminophen (TYLENOL) tablet 650 mg  650 mg Oral Q4H PRN  
 traMADol (ULTRAM) tablet 50 mg  50 mg Oral Q6H PRN  
 traMADol (ULTRAM) tablet 100 mg  100 mg Oral Q6H PRN  
 insulin lispro (HUMALOG) injection   SubCUTAneous AC&HS  
 albuterol (PROVENTIL VENTOLIN) nebulizer solution 2.5 mg  2.5 mg Nebulization Q4H PRN  
 ascorbic acid (vitamin C) (VITAMIN C) tablet 500 mg  500 mg Oral DAILY  aspirin delayed-release tablet 81 mg  81 mg Oral DAILY  calcium carbonate (TUMS) chewable tablet 200 mg [elemental]  200 mg Oral BID  carvedilol (COREG) tablet 6.25 mg  6.25 mg Oral BID WITH MEALS  cholecalciferol (VITAMIN D3) tablet 2,000 Units  2,000 Units Oral DAILY  escitalopram oxalate (LEXAPRO) tablet 20 mg  20 mg Oral DAILY  ferrous gluconate 324 mg (38 mg iron) tablet 1 Tab  1 Tab Oral BID  fluticasone (FLONASE) 50 mcg/actuation nasal spray 2 Spray  2 Spray Both Nostrils DAILY PRN  
 furosemide (LASIX) tablet 40 mg  40 mg Oral DAILY  isosorbide mononitrate ER (IMDUR) tablet 30 mg  30 mg Oral DAILY  loratadine (CLARITIN) tablet 10 mg  10 mg Oral DAILY PRN  
 multivitamin, tx-iron-ca-min (THERA-M w/ IRON) tablet 1 Tab  1 Tab Oral DAILY  sacubitril-valsartan (ENTRESTO) 24-26 mg tablet 1 Tab  1 Tab Oral BID  simvastatin (ZOCOR) tablet 20 mg  20 mg Oral QHS  spironolactone (ALDACTONE) tablet 25 mg  25 mg Oral BID  tiotropium (SPIRIVA) inhalation capsule 18 mcg  18 mcg Inhalation DAILY  budesonide (PULMICORT) 500 mcg/2 ml nebulizer suspension  500 mcg Nebulization BID RT And  
 albuterol (PROVENTIL VENTOLIN) nebulizer solution 2.5 mg  2.5 mg Nebulization Q6HWA RT  
 influenza vaccine 2018-19 (6 mos+)(PF) (FLUARIX QUAD/FLULAVAL QUAD) injection 0.5 mL  0.5 mL IntraMUSCular PRIOR TO DISCHARGE  alcohol 62% (NOZIN) nasal  1 Ampule  1 Ampule Topical Q12H Sulfa (sulfonamide antibiotics) and Aspirin Social History Social History  Marital status:  Spouse name: N/A  
 Number of children: N/A  
 Years of education: N/A Occupational History   Retired  
  [employer] Social History Main Topics  Smoking status: Former Smoker Packs/day: 0.25 Years: 42.00 Types: Cigarettes Start date: 10/1/1956 Quit date: 5/1/2014  Smokeless tobacco: Never Used  Alcohol use No  
 Drug use: No  
 Sexual activity: Not Asked Other Topics Concern  Weight Concern Yes  Special Diet Yes Social History Narrative Lives with her daughter. Ambulates only short distances. History Smoking Status  Former Smoker  Packs/day: 0.25  
 Years: 42.00  Types: Cigarettes  Start date: 10/1/1956  Quit date: 5/1/2014 Smokeless Tobacco  
 Never Used Family History Problem Relation Age of Onset  Heart Disease Mother CHF  Hypertension Mother  Kidney Disease Mother  Heart Disease Father CHF  Lung Disease Father  Diabetes Father  Cancer Father   
  prostate  Hypertension Father  Heart Attack Father  Heart Disease Maternal Aunt  Diabetes Brother  Coronary Artery Disease Other  Breast Cancer Neg Hx   
 
ROS: The patient has no difficulty with chest pain or shortness of breath. No fever or chills.   Comprehensive review of systems was otherwise unremarkable except as noted above. Physical Exam:  
Visit Vitals  /59  Pulse 74  Temp 98.2 °F (36.8 °C)  Resp 17  Ht 5' 2\" (1.575 m)  Wt 209 lb (94.8 kg)  SpO2 100%  BMI 38.23 kg/m2 Vitals:  
 10/08/18 2152 10/08/18 2320 10/09/18 0720 10/09/18 1519 BP: 128/77 114/69  108/59 Pulse: 68 69  74 Resp:  17  17 Temp:  98.4 °F (36.9 °C)  98.2 °F (36.8 °C) SpO2:  97% 98% 100% Weight:      
Height:      
 
10/09 0701 - 10/09 1900 In: 319 [I.V.:319] Out: 500 [Urine:500] 10/07 1901 - 10/09 0700 In: 788 [I.V.:788] Out: 2150 [Urine:2150] Constitutional: Obese, Alert, cooperative, no acute distress; appears stated age Eyes:Sclera are clear. EOMs intact ENMT: no external lesions gross hearing normal; no obvious neck masses, no ear or lip lesions, nares normal 
CV: RRR. Normal perfusion Resp: No JVD. Breathing is  non-labored; no audible wheezing. GI: soft, non-tender, non-distended Integument: Left knee with wound vac in place Musculoskeletal: No embolic signs or cyanosis. Neuro:  Oriented; moves all 4; no focal deficits Psychiatric: normal affect and mood, no memory impairment Recent vitals (if inpt): 
Patient Vitals for the past 24 hrs: 
 BP Temp Pulse Resp SpO2  
10/09/18 0723 108/59 98.2 °F (36.8 °C) 74 17 100 % 10/09/18 0720 - - - - 98 % 10/08/18 2320 114/69 98.4 °F (36.9 °C) 69 17 97 % 10/08/18 2152 128/77 - 68 - -  
10/08/18 1939 - - - - 97 % 10/08/18 1920 100/62 98.3 °F (36.8 °C) 61 18 97 % 10/08/18 1458 97/52 97.9 °F (36.6 °C) 65 18 96 % 10/08/18 1435 - - - - 96 % 10/08/18 1106 95/62 98.5 °F (36.9 °C) 72 18 98 % Labs: 
Recent Labs 10/09/18 
 5750  10/08/18 
 7487 WBC  7.1  7.7 HGB  9.1*  9.5* PLT  177  187 NA  140  140  
K  4.1  4.2 CL  101  100 CO2  34*  36* BUN  40*  38* CREA  1.84*  1.81* GLU  80  97 PTP  24.3*  25.8* INR  2.3  2.5 APTT  56.2*  55.0* TBILI   --   0.4 SGOT   --   11*  
 ALT   --   11* AP   --   47* LPSE   --   157 Lab Results Component Value Date/Time WBC 7.1 10/09/2018 04:14 AM  
 HGB 9.1 (L) 10/09/2018 04:14 AM  
 PLATELET 691 75/97/7756 04:14 AM  
 Sodium 140 10/09/2018 04:14 AM  
 Potassium 4.1 10/09/2018 04:14 AM  
 Chloride 101 10/09/2018 04:14 AM  
 CO2 34 (H) 10/09/2018 04:14 AM  
 BUN 40 (H) 10/09/2018 04:14 AM  
 Creatinine 1.84 (H) 10/09/2018 04:14 AM  
 Glucose 80 10/09/2018 04:14 AM  
 INR 2.3 10/09/2018 04:14 AM  
 aPTT 56.2 (H) 10/09/2018 04:14 AM  
 Bilirubin, total 0.4 10/08/2018 04:26 AM  
 AST (SGOT) 11 (L) 10/08/2018 04:26 AM  
 ALT (SGPT) 11 (L) 10/08/2018 04:26 AM  
 Alk. phosphatase 47 (L) 10/08/2018 04:26 AM  
 Amylase 88 01/31/2012 03:12 PM  
 Lipase 157 10/08/2018 04:26 AM  
 Lactic acid 0.7 02/16/2016 06:16 AM  
 Troponin-I <0.05 01/31/2012 03:32 PM  
 Troponin-I, Qt. 0.08 (H) 12/01/2016 02:20 AM  
 
 
10/6/18: Two-view left knee x-ray 
  
Reference exam: September 7, 2018 
  
INDICATION: Diabetic with open wound 
  
FINDINGS: 2 images are presented, soft tissue edema and irregularity is seen 
along the inferior patellar tendon anteriorly but there is no joint effusion 
seen and no bony injury identified. 
  
IMPRESSION: Soft tissue injury seen but no bony injury noted. If osteomyelitis 
is suspected MRI should be considered. I reviewed recent labs and recent radiologic studies. I independently reviewed radiology images for studies I described above or studies I have ordered. Admission date (for inpatients): 10/6/2018 * No surgery found *  * No surgery found * ASSESSMENT/PLAN: 
Problem List  Date Reviewed: 10/6/2018 Codes Class Noted Open wound of left lower extremity with complication YHO-[id number]: E60.804T ICD-9-CM: 891.1  10/7/2018 * (Principal)Abscess ICD-10-CM: L02.91 
ICD-9-CM: 682.9  10/6/2018 History of incision and drainage ICD-10-CM: Z98.890 ICD-9-CM: V45.89  10/6/2018 Traumatic hematoma of left knee (Chronic) ICD-10-CM: G97.05ZH ICD-9-CM: 924.11  9/8/2018 Debility ICD-10-CM: R53.81 ICD-9-CM: 799.3  9/8/2018 Anemia (Chronic) ICD-10-CM: D64.9 ICD-9-CM: 285.9  9/7/2018 Chronic respiratory failure with hypoxia (HCC) (Chronic) ICD-10-CM: J96.11 
ICD-9-CM: 518.83, 799.02  9/7/2018 Encounter for immunization ICD-10-CM: O82 ICD-9-CM: V03.89  8/21/2018 Obesity, morbid ([facility] (Chronic) ICD-10-CM: E66.01 
ICD-9-CM: 278.01  6/8/2018 Physical debility (Chronic) ICD-10-CM: R53.81 ICD-9-CM: 799.3  5/2/2018 Closed nondisplaced fracture of shaft of fifth metacarpal bone of left hand ICD-10-CM: N35.883O ICD-9-CM: 815.03  4/28/2018 Subdural hematoma ([facility] ICD-10-CM: O70.7Q5O 
ICD-9-CM: 432.1  4/27/2018 Long term (current) use of anticoagulants (Chronic) ICD-10-CM: Z79.01 
ICD-9-CM: V58.61  4/19/2018 Dysthymia ICD-10-CM: F34.1 ICD-9-CM: 300.4  4/5/2018 Type 2 diabetes mellitus with nephropathy (HCC) (Chronic) ICD-10-CM: E11.21 
ICD-9-CM: 250.40, 583.81  12/18/2017 Pulmonary hypertension (HCC) (Chronic) ICD-10-CM: I27.20 ICD-9-CM: 416.8  6/15/2016 S/P AVR (aortic valve replacement) (Chronic) ICD-10-CM: Z95.2 ICD-9-CM: V43.3  Unknown Overview Signed 2/23/2016 11:04 AM by Jerica Velazquez/Artific Cardiomyopathy ([address] (Chronic) ICD-10-CM: I42.9 ICD-9-CM: 425.4  Unknown Osteopenia ICD-10-CM: M85.80 ICD-9-CM: 733.90  Unknown HLD (hyperlipidemia) (Chronic) ICD-10-CM: O46.8 ICD-9-CM: 272.4  Unknown Osteoarthritis ICD-10-CM: M19.90 ICD-9-CM: 715.90  Unknown  
   
 ICD (implantable cardioverter-defibrillator) in place ICD-10-CM: Z95.810 ICD-9-CM: V45.02  10/2/2014 Overview Signed 10/2/2014  4:58 PM by Teddy Ritchieronik single-chamber ICD implantation 10/20/14  Diabetes mellitus type 2, diet-controlled (HCC) (Chronic) ICD-10-CM: E11.9 ICD-9-CM: 250.00  8/28/2014 Overview Signed 10/6/2018  8:56 PM by Luzmaria Montez NP  
  A1C: 5.7 COPD (chronic obstructive pulmonary disease) (HCC) (Chronic) ICD-10-CM: J44.9 ICD-9-CM: 435  4/2/2014 Overview Signed 10/6/2018  8:56 PM by Luzmaria Montez NP  
  HOME OXYGEN 3 LITERS 
  
  
   
 REJI (obstructive sleep apnea)-cpap (Chronic) ICD-10-CM: X40.58 
ICD-9-CM: 327.23  4/2/2014 CKD (chronic kidney disease) stage 3, GFR 30-59 ml/min (HCC) (Chronic) ICD-10-CM: N18.3 ICD-9-CM: 585.3  7/10/2013 Anticoagulated on Coumadin (Chronic) ICD-10-CM: Z51.81, Z79.01 
ICD-9-CM: V58.83, V58.61  7/9/2013 Overview Signed 7/9/2013  4:16 PM by Veronica Cintron NP  
  S/P AVR 
  
  
   
 CAD (coronary artery disease) (Chronic) ICD-10-CM: I25.10 ICD-9-CM: 414.00  1/20/2013 Overview Signed 5/20/2014 10:05 AM by Chery Hilton NP  
  5/8/14 PCI LAD with stent placed Chronic combined systolic and diastolic heart failure (HCC) (Chronic) ICD-10-CM: I50.42 
ICD-9-CM: 428.42  1/20/2013 Overview Addendum 4/28/2018  4:53 AM by Edgar Pulido MD  
  5/8/14 ECHO:  EF 10-15% 12/2017:  EF 25-30% Essential hypertension, benign (Chronic) ICD-10-CM: I10 
ICD-9-CM: 401.1  1/20/2013 MDS (myelodysplastic syndrome) (HCC) (Chronic) ICD-10-CM: D46.9 ICD-9-CM: 238.75  12/17/2011 Overview Addendum 8/29/2013 11:06 AM by Ever Ill Procrit started in August, 2011 12/18/11 Procrit weekly and Iron stores. 5-12 12-13-12 good response to 3 weekly procrit did not need it last time 3-7-13 Pt doing well. Just wanted a \"check-up. \" Responding to Procrit every three weeks. 8-29-13 patient has missed some injections on recently restarted hemoglobin only issue , takes oral iron iron stores each time Iron deficiency anemia due to chronic blood loss (Chronic) ICD-10-CM: D50.0 ICD-9-CM: 280.0  7/29/2009 Principal Problem: Abscess (10/6/2018) Active Problems: 
  MDS (myelodysplastic syndrome) ([address] (12/17/2011) Overview: Procrit started in August, 2011 12/18/11 Procrit weekly and Iron stores. 5-12 12-13-12 good response to 3 weekly procrit did not need it last time 3-7-13 Pt doing well. Just wanted a \"check-up. \" Responding to Procrit  
    every three weeks. 8-29-13 patient has missed some injections on recently restarted  
    hemoglobin only issue , takes oral iron iron stores each time CAD (coronary artery disease) (1/20/2013) Overview: 5/8/14 PCI LAD with stent placed Chronic combined systolic and diastolic heart failure ([address] (1/20/2013) Overview: 5/8/14 ECHO:  EF 10-15% 12/2017:  EF 25-30% Essential hypertension, benign (1/20/2013) Anticoagulated on Coumadin (7/9/2013) Overview: S/P AVR 
 
  CKD (chronic kidney disease) stage 3, GFR 30-59 ml/min ([facility]) (7/10/2013) COPD (chronic obstructive pulmonary disease) ([address] (4/2/2014) Overview: HOME OXYGEN 3 LITERS 
 
  REJI (obstructive sleep apnea)-cpap (4/2/2014) Diabetes mellitus type 2, diet-controlled ([address] (8/28/2014) Overview: A1C: 5.7 ICD (implantable cardioverter-defibrillator) in place (10/2/2014) Overview: Bi[employer]ik single-chamber ICD implantation 10/20/14 S/P AVR (aortic valve replacement) () Overview: Mechanical/Artific Cardiomyopathy ([address] () Pulmonary hypertension ([address] (6/15/2016) Physical debility (5/2/2018) Obesity, morbid ([address] (6/8/2018) Anemia (9/7/2018) Chronic respiratory failure with hypoxia ([address] (9/7/2018) Traumatic hematoma of left knee (9/8/2018) History of incision and drainage (10/6/2018) Open wound of left lower extremity with complication (01/6/5760) Plan: 
Care Management per Hospitalist 
IV Abx - per ID Cipro/Clinda Cx growing P. Mirabilis Wound care following for Vac Signed: Fiona Velazquez NP I have seen and examined the patient with the Nurse Practitioner and agree with the above assessment and plan. Tera Barron.  Cisco Hammans, MD

## 2018-10-09 NOTE — PROGRESS NOTES
Shift assessment complete via doc flow sheet. A+OX4. RR even and unlabored. HR regular, ICD. LS diminished. Wound Vac in place, no complications. Bed LL. All needs met at this time. Staff will monitor with hourly rounds.

## 2018-10-09 NOTE — PROGRESS NOTES
Dispo update:  Spoke to MsKimo Ellis in room 214 about discharge planning. Discussed 9th floor IRU, return to STR at a SNF, and home health. Agreed to home health PT and RN (including 3x/week wound vac dressing changes). She has her own Trilogy non-invasive positive pressure and supplemental oxygen supplies at home. Will order wound vac from [facility].

## 2018-10-09 NOTE — PROGRESS NOTES
Problem: Mobility Impaired (Adult and Pediatric) Goal: *Acute Goals and Plan of Care (Insert Text) LTG: 
(1.)Ms. Jada Gatica will move from supine to sit and sit to supine , scoot up and down and roll side to side in flat bed without siderails with  INDEPENDENT within 7 day(s). (2.)Ms. Jada Gatica will perform all functional transfers with  INDEPENDENT using the least restrictive/no device within 7 day(s). (3.)Ms. Jada Gatica will ambulate with  SUPERVISION for 250+ feet with normal vital sign response with the least restrictive/no device within 7 day(s). (4.)Ms. Jada Gatica will ambulate up/down 4 steps with bilateral  railing with  SUPERVISION with no device within 7 day(s). PHYSICAL THERAPY: Daily Note, Treatment Day: 1st, AM 10/9/2018 INPATIENT: Hospital Day: 4 [place] / Product Type: Medicare /  
  
NAME/AGE/GENDER: Daphne Marshall is a 77 y.o. female PRIMARY DIAGNOSIS: Abscess Abscess Abscess ICD-10: Treatment Diagnosis:  
 · Other abnormalities of gait and mobility (R26.89) · History of falling (Z91.81) Precaution/Allergies: 
Sulfa (sulfonamide antibiotics) and Aspirin ASSESSMENT:  
 
Ms. Jada Gatica presents sitting in recliner, pleasant and agreeable for skilled PT. She has wound vac on knee today. Performed LE exercises. She stood with SBA and ambulated 250' with SBA on 3L 02 with assist to transport the O2 cylinder and wound vac. SpO2 93% after gait. She would benefit from 1-2 more treatments to ensure that she remains mobile and active while in acute care. This section established at most recent assessment PROBLEM LIST (Impairments causing functional limitations): 1. Decreased Ambulation Ability/Technique 2. Decreased Activity Tolerance 3. Increased Shortness of Breath 4. Decreased Skin Integrity/Hygeine 5.  Decreased [place] with Home Exercise Program 
 INTERVENTIONS PLANNED: (Benefits and precautions of physical therapy have been discussed with the patient.) 1. Balance Exercise 2. Bed Mobility 3. Gait Training 4. Home Exercise Program (HEP) 5. Therapeutic Activites 6. Therapeutic Exercise/Strengthening 7. Transfer Training 8. education 9. Group Therapy TREATMENT PLAN: Frequency/Duration: 2 times a week for 1 week Rehabilitation Potential For Stated Goals: Excellent RECOMMENDED REHABILITATION/EQUIPMENT: (at time of discharge pending progress): Due to the probability of continued deficits (see above) this patient will likely need continued skilled physical therapy after discharge. ??? Pending progress. Equipment:  
? None at this time HISTORY:  
History of Present Injury/Illness (Reason for Referral): 
Per MD note, \"Patient is a 77 y.o.  female with extensive medical history as noted below on chronic coumadin who initially presented to ER 9/7 after fall in bathroom 8/28 where she landed directly on her left knee with complaints of dizziness and fatigue and found to  Have Hgb of 6.7. She was transfused to Hgb of 9.6. She also was found with a large hematoma/bullae over her left knee and INR of 4.3. She underwent an I+D per Dr Antonietta Kearney on 9/11 with a large amount of serosanguinous fluid evacuated. She has been followed by home health PT, OT and wound care since discharge from rehab on ninth floor 9/11-9/21. She has done well except the wound has not closed. Two days ago, [facility] RN and daughter who is an RN noted mild erythema and heat surrounding upper half of wound. Open area approx 5-6 cm in circumference. No purulent drainage, oozing small amounts of dark blood with slightly foul odor. Full ROM to knee. All distal circulation, motion and sensation WNL for patient. No distal edema, mild local edema.   Multiple old scars from prior lower extremity wounds. Patient denies pain to area, fever, systemic symptoms. She does not appear septic and lactic acid is 1.8. WBC is 11.6 without shift. Wound cultured by me in ER prior to administration of antibiotics. Begun on vancomycin and ceftriaxone until cultures resulted. Large, attentive family at bedside. Creatinine 1.48 on admission with baseline of 1.2. Of note, patient reports diarrhea x 4 days without use of antibiotics, additional GI symptoms.  
  
Dr Luis Eduardo Snow follows patient for her coumadin dosing, was cut from 5 mg daily to 2.5 mg daily on  with INR of 3.9 (target: 2-3). She also has MDS syndrome, sees Hematology and receives regular blood transfusions and procrit. \" 
Past Medical History/Comorbidities: Ms. Chrissie Pastrana  has a past medical history of Anticoagulated on Coumadin (2013); CAD (coronary artery disease) (2013); Cardiomyopathy [facility]); CHF (congestive heart failure) ([facility] (2017); CKD (chronic kidney disease) stage 3, GFR 30-59 ml/min ([facility]) (7/10/2013); COPD (chronic obstructive pulmonary disease) ([facility] (2014); Essential hypertension, benign (2013); HLD (hyperlipidemia); ICD (implantable cardioverter-defibrillator) in place (10/2/2014); Iron deficiency anemia due to chronic blood loss (2009); MDS (myelodysplastic syndrome) ([facility] (2011); REJI (obstructive sleep apnea)-cpap (2014); Osteoarthritis; Osteopenia; Quadrantanopia; and Visual complaint (2015). She also has no past medical history of Contact dermatitis and other eczema, due to unspecified cause; Difficult intubation; Malignant hyperthermia due to anesthesia; Nausea & vomiting; Pseudocholinesterase deficiency; or Unspecified adverse effect of anesthesia.   Ms. Chrissie Pastrana  has a past surgical history that includes cardiac catheterization (); ir bx bone marrow diagnostic (2011); hx aortic valve replacement (, ); hx  section; hx tubal ligation; hx heart catheterization (2013); hx coronary stent placement (May, 2014); hx pacemaker (10/2/2014); and hx endoscopy (7/2009). Social History/Living Environment:  
Home Environment: Apartment # Steps to Enter: 4 One/Two Story Residence: One story Living Alone: Yes Support Systems: Family member(s), Home care staff Patient Expects to be Discharged to[de-identified] XQWKN Current DME Used/Available at Home: Shower chair, Grab bars, Commode, bedside, Raised toilet seat, Walker, rolling Tub or Shower Type: Tub/Shower combination Prior Level of Function/Work/Activity: 
Lives alone. Ambulates independently in home and community. Number of Personal Factors/Comorbidities that affect the Plan of Care: 3+: HIGH COMPLEXITY EXAMINATION:  
Most Recent Physical Functioning:  
Gross Assessment: 
AROM: Generally decreased, functional 
Strength: Generally decreased, functional 
Tone: Normal 
         
  
Posture: 
Posture (WDL): Exceptions to [facility] Posture Assessment: Forward head, Rounded shoulders Balance: 
Sitting: Intact Standing: Intact Bed Mobility: 
  
Wheelchair Mobility: 
  
Transfers: 
Sit to Stand: Supervision Stand to Sit: Supervision Gait: 
  
Speed/Winifred: Slow Step Length: Right shortened;Left shortened Distance (ft): 250 Feet (ft) Ambulation - Level of Assistance: Stand-by assistance (with assist for managing O2 cylinder and wound vac) Body Structures Involved: 1. Metabolic Body Functions Affected: 1. Movement Related 2. Skin Related Activities and Participation Affected: 1. Mobility 2. Self Care 3. Domestic Life Number of elements that affect the Plan of Care: 3: MODERATE COMPLEXITY CLINICAL PRESENTATION:  
Presentation: Evolving clinical presentation with changing clinical characteristics: MODERATE COMPLEXITY CLINICAL DECISION MAKING:  
[employer] AM-PAC 6 Clicks Basic Mobility Inpatient Short Form How much difficulty does the patient currently have. .. Unable A Lot A Little None 1. Turning over in bed (including adjusting bedclothes, sheets and blankets)? [] 1   [] 2   [x] 3   [] 4  
2. Sitting down on and standing up from a chair with arms ( e.g., wheelchair, bedside commode, etc.)   [] 1   [] 2   [x] 3   [] 4  
3. Moving from lying on back to sitting on the side of the bed? [] 1   [] 2   [x] 3   [] 4 How much help from another person does the patient currently need. .. Total A Lot A Little None 4. Moving to and from a bed to a chair (including a wheelchair)? [] 1   [] 2   [x] 3   [] 4  
5. Need to walk in hospital room? [] 1   [] 2   [x] 3   [] 4  
6. Climbing 3-5 steps with a railing? [] 1   [] 2   [x] 3   [] 4  
© 2007, Trustees of [facility] [place] [employer], under license to [employer]. All rights reserved Score:  Initial: 18 Most Recent: X (Date: -- ) Interpretation of Tool:  Represents activities that are increasingly more difficult (i.e. Bed mobility, Transfers, Gait). Score 24 23 22-20 19-15 14-10 9-7 6 Modifier CH CI CJ CK CL CM CN   
 
? Mobility - Walking and Moving Around:  
  - CURRENT STATUS: CK - 40%-59% impaired, limited or restricted  - GOAL STATUS: CJ - 20%-39% impaired, limited or restricted  - D/C STATUS:  ---------------To be determined--------------- [place] / [place] B / Product Type: Medicare /   
 
Medical Necessity:    
· Patient is expected to demonstrate progress in strength, range of motion, balance and functional technique to increase independence with   and improve safety during all functional mobility. Reason for Services/Other Comments: 
· Patient continues to require skilled intervention due to medical complications.   
Use of outcome tool(s) and clinical judgement create a POC that gives a: Clear prediction of patient's progress: LOW COMPLEXITY  
  
 
 
 
TREATMENT:  
 (In addition to Assessment/Re-Assessment sessions the following treatments were rendered) Pre-treatment Symptoms/Complaints:  Knee stinging Pain: Initial:  
Pain Intensity 1: 1 Pain Location 1: Knee Pain Orientation 1: Left  Post Session:  1 Therapeutic Activity: (    15 minutes): Therapeutic activities including Chair transfers and Ambulation on level ground to improve mobility, strength and balance. Required minimal cueing   to promote breathing technique. Therapeutic Exercise: (  10 minutes):  Exercises per grid below to improve strength and coordination. Required minimal visual and verbal cues to promote proper body alignment and promote proper body breathing techniques. Progressed complexity of movement as indicated. DATE: 10/08/18 10/09/18 Ambulation Hip Flexion x20 AB x25 AB Long Arc Quads x20 AB Knee Squeezes Ankle DF/PF x20 AB x25 AB Key:  A=active, AA=active assisted, P=passive, B=bilaterally, R=right, L=left DF=dorsiflexion, PF=plantarflexion Braces/Orthotics/Lines/Etc:  
· wound vac · O2 Device: Nasal cannula Treatment/Session Assessment:   
· Response to Treatment:  Pleasant and cooperative. · Interdisciplinary Collaboration:  
o Physical Therapist 
o Registered Nurse 
o  · After treatment position/precautions:  
o Up in chair 
o Bed/Chair-wheels locked 
o Call light within reach · Compliance with Program/Exercises: compliant all of the time. · Recommendations/Intent for next treatment session: \"Next visit will focus on advancements to more challenging activities and reduction in assistance provided\". Total Treatment Duration: PT Patient Time In/Time Out Time In: 6300 Time Out: 1200 Scotty Braga PT, DPT

## 2018-10-09 NOTE — PROGRESS NOTES
Hospitalist Progress Note   
10/9/2018 Admit Date: 10/6/2018  3:23 PM  
NAME: Mary Herman :  1950 MRN:  [id number] Attending: Vernon Haro DO 
PCP:  Kaye Sandhu MD 
 
SUBJECTIVE:  
Patient is a 69yoF with extensive PMH who was admitted to the hospital on 10/6 with non-healing wound of L knee. Initial fall occurred in August with large L knee hematoma and hgb of 6.7. She underwent transfusion. She was at [facility] and had been improving functionally, but unfortunately, this wound did not heal.  On admission here, patient was started on broad spectrum abx. Patient's wound cx has grown P.mirabilis. ID has transitioned her to PO abx based on susceptibilities today. Wound vac was also placed today. Patient is improving. 10/9/18:  Patient is doing well. No complaints. She denies fevers. Just worked with PT with good mobility. Review of Systems negative with exception of pertinent positives noted above PHYSICAL EXAM  
 
Visit Vitals  BP 99/65  Pulse 72  Temp 98 °F (36.7 °C)  Resp 18  Ht 5' 2\" (1.575 m)  Wt 94.8 kg (209 lb)  SpO2 91%  BMI 38.23 kg/m2 Temp (24hrs), Av.2 °F (36.8 °C), Min:97.9 °F (36.6 °C), Max:98.4 °F (36.9 °C) Oxygen Therapy O2 Sat (%): 91 % (10/09/18 1043) Pulse via Oximetry: 66 beats per minute (10/09/18 0720) O2 Device: Nasal cannula (10/09/18 07) O2 Flow Rate (L/min): 3 l/min (10/09/18 0720) FIO2 (%): 32 % (10/09/18 07) Intake/Output Summary (Last 24 hours) at 10/09/18 1218 Last data filed at 10/09/18 1002 Gross per 24 hour Intake             1107 ml Output             1550 ml Net             -443 ml General: No acute distress   
Lungs:  CTA Bilaterally. Heart:  Regular rate and rhythm,  No murmur, rub, or gallop Abdomen: Soft, Non distended, Non tender, Positive bowel sounds Extremities: No cyanosis, clubbing or edema. Wound vac in place over L knee. Neurologic:  No focal deficits ASSESSMENT Active Hospital Problems Diagnosis Date Noted  Open wound of left lower extremity with complication 75/91/2160  Abscess 10/06/2018  History of incision and drainage 10/06/2018  Traumatic hematoma of left knee 09/08/2018  Chronic respiratory failure with hypoxia ([facility] 09/07/2018  Anemia 09/07/2018  Obesity, morbid ([facility] 06/08/2018  Physical debility 05/02/2018  Pulmonary hypertension ([facility] 06/15/2016  S/P AVR (aortic valve replacement) Mechanical/Artific  Cardiomyopathy ([facility]  ICD (implantable cardioverter-defibrillator) in place 10/02/2014 Biotronik single-chamber ICD implantation 10/20/14  Diabetes mellitus type 2, diet-controlled ([facility] 08/28/2014 A1C: 5.7  REJI (obstructive sleep apnea)-cpap 04/02/2014  COPD (chronic obstructive pulmonary disease) ([facility] 04/02/2014 HOME OXYGEN 3 LITERS 
  
 CKD (chronic kidney disease) stage 3, GFR 30-59 ml/min ([facility]) 07/10/2013  Anticoagulated on Coumadin 07/09/2013 S/P AVR  CAD (coronary artery disease) 01/20/2013 5/8/14 PCI LAD with stent placed  Chronic combined systolic and diastolic heart failure ([facility] 01/20/2013 5/8/14 ECHO:  EF 10-15% 12/2017:  EF 25-30%  Essential hypertension, benign 01/20/2013  MDS (myelodysplastic syndrome) ([facility] 12/17/2011 Procrit started in August, 2011 12/18/11 Procrit weekly and Iron stores. 5-12 12-13-12 good response to 3 weekly procrit did not need it last time 3-7-13 Pt doing well. Just wanted a \"check-up. \" Responding to Procrit every three weeks. 8-29-13 patient has missed some injections on recently restarted hemoglobin only issue , takes oral iron iron stores each time Plan: 
Cellulitis/Abscess of L knee - Wound cx growing P.mirabilis Janis Anthony' recommendations - Transitioned to PO Cipro and Clinda for 10-14 days - Wound vac in place - CM working on home wound vac approval, will go home with [facility] MDS 
- Stable - Monitoring hgb - Followed by Hematology as OP Cardiac history (extensive) - No chest pain 
- Stable - Continue home meds HTN 
- Stable - Home meds Chronic anticoagulation - Pharmacy following and dosing - Therapeutic INR 
 
CKD stage 3 
- Stable - Following BMP 
 
COPD on home O2 
- 3L 
- Stable - Home mebs REJI on CPAP(Trilogy) - Continue home Trilogy during hospitalization DISPO:  Improving. Transitioned to PO abx today. Setting up home wound vac. Likely home in 1-2 days with HH. Signed By: Sonja Angelucci, MD   
 October 9, 2018

## 2018-10-09 NOTE — PROGRESS NOTES
Bedside shift change report given to Ria Stout RN (oncoming nurse) by Kimo Doe RN (offgoing nurse). Report included the following information SBAR, Kardex, OR Summary, Intake/Output, MAR, Recent Results and Med Rec Status.

## 2018-10-09 NOTE — PROGRESS NOTES
[address] [place] [address] Drive Face to Face Encounter Patients Name: Orville Gabriel    YOB: 1950 Ordering Physician: Dr. Vidya Huitron MD  
 
Primary Diagnosis: Abscess Date of Face to Face:   10/9/2018 Face to Face Encounter findings are related to primary reason for home care:   yes. 1. I certify that the patient needs intermittent care as follows: skilled nursing care:  teaching/training of wound vac, new meds 
physical therapy: strengthening, stretching/ROM, transfer training, gait/stair training, balance training and pt/caregiver education 2. I certify that this patient is homebound, that is: 1) patient requires the use of a no assisitive device  device, special transportation, or assistance of another to leave the home; or 2) patient's condition makes leaving the home medically contraindicated; and 3) patient has a normal inability to leave the home and leaving the home requires considerable and taxing effort. Patient may leave the home for infrequent and short duration for medical reasons, and occasional absences for non-medical reasons. Homebound status is due to the following functional limitations: Patient currently under activity restrictions secondary to recent surgical procedure, this hinders their ability to safely leave the home. 3. I certify that this patient is under my care and that I, or a nurse practitioner or  [id number], or clinical nurse specialist, or certified nurse midwife, working with me, had a Face-to-Face Encounter that meets the physician Face-to-Face Encounter requirements. The following are the clinical findings from the [address] [place] [address] encounter that support the need for skilled services and is a summary of the encounter: see hospital chart See hospital chart Jonathan Watts RN 
10/9/2018 THE FOLLOWING TO BE COMPLETED BY THE COMMUNITY PHYSICIAN: 
 
 I concur with the findings described above from the F2F encounter that this patient is homebound and in need of a skilled service. Certifying Physician: _____________________________________ Printed Certifying Physician Name: _____________________________________ Date: _________________

## 2018-10-09 NOTE — PROGRESS NOTES
Warfarin dosing per pharmacist 
 
Yao Eve Grey is a 77 y.o. female. Height: 5' 2\" (157.5 cm)    Weight: 94.8 kg (209 lb) Indication:  AVR Goal INR:  2-3 Home dose:  2.5 mg daily Risk factors or significant drug interactions:  Cipro PO (started 10/9), Clinda PO (started 10/9) Other anticoagulants:  N/A Daily Monitoring Date  INR     Warfarin dose HGB              Notes 10/6  2.6  2.5 mg  10.9 
10/7  3  2 mg  9 
10/8  2.5  2.5 mg  9.5 
10/9  2.3  2.5 mg  9.1 The patient is followed by a warfarin clinic which she had a meeting with on 10/5 with an INR of 2.6. INR 2.3. Continue home dose of 2.5 mg qhs and follow daily INR. Monitor for interaction with oral cipro started today. Pharmacy will continue to follow. Please call with any questions. Thank you, Erica Braga, Pharm. D. Clinical Pharmacist 
[phone]

## 2018-10-09 NOTE — WOUND CARE
Patient seen for left knee wound, VAC dressing/therapy started today as requested by surgical team. Patient and daughter updated and all questions answered. Home VAC form clinicals filled out and given to [facility], care manager at this time. Used layer of adaptic to protect, is on blood thinner. Will follow up tomorrow.

## 2018-10-10 VITALS
SYSTOLIC BLOOD PRESSURE: 105 MMHG | TEMPERATURE: 97.4 F | HEIGHT: 62 IN | RESPIRATION RATE: 18 BRPM | HEART RATE: 66 BPM | WEIGHT: 209 LBS | DIASTOLIC BLOOD PRESSURE: 52 MMHG | OXYGEN SATURATION: 95 % | BODY MASS INDEX: 38.46 KG/M2

## 2018-10-10 LAB
ANION GAP SERPL CALC-SCNC: 6 MMOL/L (ref 7–16)
APTT PPP: 60.2 SEC (ref 23.2–35.3)
BUN SERPL-MCNC: 38 MG/DL (ref 8–23)
CALCIUM SERPL-MCNC: 8.7 MG/DL (ref 8.3–10.4)
CHLORIDE SERPL-SCNC: 103 MMOL/L (ref 98–107)
CO2 SERPL-SCNC: 32 MMOL/L (ref 21–32)
CREAT SERPL-MCNC: 1.67 MG/DL (ref 0.6–1)
GLUCOSE BLD STRIP.AUTO-MCNC: 116 MG/DL (ref 65–100)
GLUCOSE BLD STRIP.AUTO-MCNC: 177 MG/DL (ref 65–100)
GLUCOSE SERPL-MCNC: 112 MG/DL (ref 65–100)
INR PPP: 2.3
POTASSIUM SERPL-SCNC: 4.8 MMOL/L (ref 3.5–5.1)
PROTHROMBIN TIME: 24.2 SEC (ref 11.5–14.5)
SODIUM SERPL-SCNC: 141 MMOL/L (ref 136–145)

## 2018-10-10 PROCEDURE — 80048 BASIC METABOLIC PNL TOTAL CA: CPT

## 2018-10-10 PROCEDURE — 74750000023 HC WOUND THERAPY

## 2018-10-10 PROCEDURE — 3331090002 HH PPS REVENUE DEBIT

## 2018-10-10 PROCEDURE — 85610 PROTHROMBIN TIME: CPT

## 2018-10-10 PROCEDURE — 97605 NEG PRS WND THER DME<=50SQCM: CPT

## 2018-10-10 PROCEDURE — 74011250637 HC RX REV CODE- 250/637: Performed by: INTERNAL MEDICINE

## 2018-10-10 PROCEDURE — 77010033678 HC OXYGEN DAILY

## 2018-10-10 PROCEDURE — 82962 GLUCOSE BLOOD TEST: CPT

## 2018-10-10 PROCEDURE — 77030020263 HC SOL INJ SOD CL0.9% LFCR 1000ML

## 2018-10-10 PROCEDURE — 3331090001 HH PPS REVENUE CREDIT

## 2018-10-10 PROCEDURE — 74011636637 HC RX REV CODE- 636/637: Performed by: NURSE PRACTITIONER

## 2018-10-10 PROCEDURE — 36415 COLL VENOUS BLD VENIPUNCTURE: CPT

## 2018-10-10 PROCEDURE — 85730 THROMBOPLASTIN TIME PARTIAL: CPT

## 2018-10-10 PROCEDURE — 74011250637 HC RX REV CODE- 250/637: Performed by: FAMILY MEDICINE

## 2018-10-10 PROCEDURE — 74011000250 HC RX REV CODE- 250: Performed by: INTERNAL MEDICINE

## 2018-10-10 PROCEDURE — 77030019934 HC DRSG VAC ASST KCON -B

## 2018-10-10 PROCEDURE — 94640 AIRWAY INHALATION TREATMENT: CPT

## 2018-10-10 PROCEDURE — 74011250637 HC RX REV CODE- 250/637: Performed by: NURSE PRACTITIONER

## 2018-10-10 RX ORDER — CLINDAMYCIN HYDROCHLORIDE 300 MG/1
300 CAPSULE ORAL 3 TIMES DAILY
Qty: 42 CAP | Refills: 0 | Status: SHIPPED | OUTPATIENT
Start: 2018-10-10 | End: 2018-10-24

## 2018-10-10 RX ORDER — CALCIUM CARBONATE 200(500)MG
200 TABLET,CHEWABLE ORAL ONCE
Status: COMPLETED | OUTPATIENT
Start: 2018-10-10 | End: 2018-10-10

## 2018-10-10 RX ORDER — CIPROFLOXACIN 500 MG/1
500 TABLET ORAL 2 TIMES DAILY
Qty: 28 TAB | Refills: 0 | Status: SHIPPED | OUTPATIENT
Start: 2018-10-10 | End: 2018-11-01

## 2018-10-10 RX ADMIN — BUDESONIDE 500 MCG: 0.5 INHALANT RESPIRATORY (INHALATION) at 08:15

## 2018-10-10 RX ADMIN — TRAMADOL HYDROCHLORIDE 100 MG: 50 TABLET, FILM COATED ORAL at 06:02

## 2018-10-10 RX ADMIN — SPIRONOLACTONE 25 MG: 25 TABLET ORAL at 08:03

## 2018-10-10 RX ADMIN — SACUBITRIL AND VALSARTAN 1 TABLET: 24; 26 TABLET, FILM COATED ORAL at 08:01

## 2018-10-10 RX ADMIN — FERROUS GLUCONATE 1 TABLET: 324 TABLET ORAL at 08:03

## 2018-10-10 RX ADMIN — DOCUSATE SODIUM 100 MG: 100 CAPSULE, LIQUID FILLED ORAL at 08:03

## 2018-10-10 RX ADMIN — INSULIN LISPRO 2 UNITS: 100 INJECTION, SOLUTION INTRAVENOUS; SUBCUTANEOUS at 11:30

## 2018-10-10 RX ADMIN — ESCITALOPRAM OXALATE 20 MG: 10 TABLET ORAL at 08:01

## 2018-10-10 RX ADMIN — TRAMADOL HYDROCHLORIDE 50 MG: 50 TABLET, FILM COATED ORAL at 15:05

## 2018-10-10 RX ADMIN — ALBUTEROL SULFATE 2.5 MG: 2.5 SOLUTION RESPIRATORY (INHALATION) at 08:15

## 2018-10-10 RX ADMIN — CALCIUM CARBONATE (ANTACID) CHEW TAB 500 MG 200 MG: 500 CHEW TAB at 15:51

## 2018-10-10 RX ADMIN — CARVEDILOL 6.25 MG: 6.25 TABLET, FILM COATED ORAL at 08:03

## 2018-10-10 RX ADMIN — FUROSEMIDE 40 MG: 40 TABLET ORAL at 08:02

## 2018-10-10 RX ADMIN — CIPROFLOXACIN 500 MG: 500 TABLET, FILM COATED ORAL at 08:02

## 2018-10-10 RX ADMIN — MULTIPLE VITAMINS W/ MINERALS TAB 1 TABLET: TAB at 08:03

## 2018-10-10 RX ADMIN — Medication 5 ML: at 14:06

## 2018-10-10 RX ADMIN — ISOSORBIDE MONONITRATE 30 MG: 30 TABLET, EXTENDED RELEASE ORAL at 08:02

## 2018-10-10 RX ADMIN — CLINDAMYCIN HYDROCHLORIDE 300 MG: 150 CAPSULE ORAL at 15:04

## 2018-10-10 RX ADMIN — Medication 1 AMPULE: at 08:00

## 2018-10-10 RX ADMIN — ASPIRIN 81 MG: 81 TABLET, COATED ORAL at 08:01

## 2018-10-10 RX ADMIN — TIOTROPIUM BROMIDE 18 MCG: 18 CAPSULE ORAL; RESPIRATORY (INHALATION) at 08:17

## 2018-10-10 RX ADMIN — OXYCODONE HYDROCHLORIDE AND ACETAMINOPHEN 500 MG: 500 TABLET ORAL at 08:02

## 2018-10-10 RX ADMIN — VITAMIN D, TAB 1000IU (100/BT) 2000 UNITS: 25 TAB at 08:03

## 2018-10-10 RX ADMIN — CALCIUM CARBONATE (ANTACID) CHEW TAB 500 MG 200 MG: 500 CHEW TAB at 08:00

## 2018-10-10 RX ADMIN — CLINDAMYCIN HYDROCHLORIDE 300 MG: 150 CAPSULE ORAL at 08:01

## 2018-10-10 RX ADMIN — ALBUTEROL SULFATE 2.5 MG: 2.5 SOLUTION RESPIRATORY (INHALATION) at 14:25

## 2018-10-10 RX ADMIN — Medication 10 ML: at 05:46

## 2018-10-10 NOTE — DISCHARGE INSTRUCTIONS
DISCHARGE SUMMARY from Nurse    PATIENT INSTRUCTIONS:    After general anesthesia or intravenous sedation, for 24 hours or while taking prescription Narcotics:  · Limit your activities  · Do not drive and operate hazardous machinery  · Do not make important personal or business decisions  · Do  not drink alcoholic beverages  · If you have not urinated within 8 hours after discharge, please contact your surgeon on call. Report the following to your surgeon:  · Excessive pain, swelling, redness or odor of or around the surgical area  · Temperature over 100.5  · Nausea and vomiting lasting longer than 4 hours or if unable to take medications  · Any signs of decreased circulation or nerve impairment to extremity: change in color, persistent  numbness, tingling, coldness or increase pain  · Any questions    What to do at Home:  Recommended activity: Activity as tolerated, per MD instructions    If you experience any of the following symptoms fever > 100.5, nausea, vomiting, pain, chest pain and/or shortness of breath to ER please follow up with MD.    *  Please give a list of your current medications to your Primary Care Provider. *  Please update this list whenever your medications are discontinued, doses are      changed, or new medications (including over-the-counter products) are added. *  Please carry medication information at all times in case of emergency situations. These are general instructions for a healthy lifestyle:    No smoking/ No tobacco products/ Avoid exposure to second hand smoke  Surgeon General's Warning:  Quitting smoking now greatly reduces serious risk to your health.     Obesity, smoking, and sedentary lifestyle greatly increases your risk for illness    A healthy diet, regular physical exercise & weight monitoring are important for maintaining a healthy lifestyle    You may be retaining fluid if you have a history of heart failure or if you experience any of the following symptoms: Weight gain of 3 pounds or more overnight or 5 pounds in a week, increased swelling in our hands or feet or shortness of breath while lying flat in bed. Please call your doctor as soon as you notice any of these symptoms; do not wait until your next office visit. Recognize signs and symptoms of STROKE:    F-face looks uneven    A-arms unable to move or move unevenly    S-speech slurred or non-existent    T-time-call 911 as soon as signs and symptoms begin-DO NOT go       Back to bed or wait to see if you get better-TIME IS BRAIN. Warning Signs of HEART ATTACK     Call 911 if you have these symptoms:   Chest discomfort. Most heart attacks involve discomfort in the center of the chest that lasts more than a few minutes, or that goes away and comes back. It can feel like uncomfortable pressure, squeezing, fullness, or pain.  Discomfort in other areas of the upper body. Symptoms can include pain or discomfort in one or both arms, the back, neck, jaw, or stomach.  Shortness of breath with or without chest discomfort.  Other signs may include breaking out in a cold sweat, nausea, or lightheadedness. Don't wait more than five minutes to call 911 - MINUTES MATTER! Fast action can save your life. Calling 911 is almost always the fastest way to get lifesaving treatment. Emergency Medical Services staff can begin treatment when they arrive -- up to an hour sooner than if someone gets to the hospital by car. The discharge information has been reviewed with the patient and caregiver. The patient and caregiver verbalized understanding. Discharge medications reviewed with the patient and caregiver and appropriate educational materials and side effects teaching were provided.   ___________________________________________________________________________________________________________________________________           Skin Abscess: Care Instructions  Your Care Instructions    A skin abscess is a bacterial infection that forms a pocket of pus. A boil is a kind of skin abscess. The doctor may have cut an opening in the abscess so that the pus can drain out. You may have gauze in the cut so that the abscess will stay open and keep draining. You may need antibiotics. You will need to follow up with your doctor to make sure the infection has gone away. The doctor has checked you carefully, but problems can develop later. If you notice any problems or new symptoms, get medical treatment right away. Follow-up care is a key part of your treatment and safety. Be sure to make and go to all appointments, and call your doctor if you are having problems. It's also a good idea to know your test results and keep a list of the medicines you take. How can you care for yourself at home? · Apply warm and dry compresses, a heating pad set on low, or a hot water bottle 3 or 4 times a day for pain. Keep a cloth between the heat source and your skin. · If your doctor prescribed antibiotics, take them as directed. Do not stop taking them just because you feel better. You need to take the full course of antibiotics. · Take pain medicines exactly as directed. ¨ If the doctor gave you a prescription medicine for pain, take it as prescribed. ¨ If you are not taking a prescription pain medicine, ask your doctor if you can take an over-the-counter medicine. · Keep your bandage clean and dry. Change the bandage whenever it gets wet or dirty, or at least one time a day. · If the abscess was packed with gauze:  ¨ Keep follow-up appointments to have the gauze changed or removed. If the doctor instructed you to remove the gauze, follow the instructions you were given for how to remove it. ¨ After the gauze is removed, soak the area in warm water for 15 to 20 minutes 2 times a day, until the wound closes. When should you call for help?   Call your doctor now or seek immediate medical care if:    · You have signs of worsening infection, such as:  ¨ Increased pain, swelling, warmth, or redness. ¨ Red streaks leading from the infected skin. ¨ Pus draining from the wound. ¨ A fever.    Watch closely for changes in your health, and be sure to contact your doctor if:    · You do not get better as expected. Where can you learn more? Go to http://macrina-thuy.info/. Enter P251 in the search box to learn more about \"Skin Abscess: Care Instructions. \"  Current as of: April 18, 2018  Content Version: 11.8  © 9158-9243 [employer]. Care instructions adapted under license by [employer] (which disclaims liability or warranty for this information). If you have questions about a medical condition or this instruction, always ask your healthcare professional. [address] any warranty or liability for your use of this information. Vacuum-Assisted Closure for Wound Healing: Care Instructions  Your Care Instructions  When you have a wound that is hard to close your doctor may treat it with vacuum-assisted closure (VAC). VAC uses negative pressure (suction) to help bring the edges of your wound together. It also removes fluid and dead tissue from the wound area. In VAC:  · A special piece of foam or cotton gauze fits over your wound. This covers and protects the wound. A clear bandage (film dressing) goes several inches beyond the foam or gauze dressing to create a seal for the vacuum. · A tube connects the foam to a small machine called the therapy unit. The therapy unit creates the suction. · The [facility] system may be carried around (portable) or may stay in one place (stationary). VAC does not hurt. You may feel a mild pulling on the wound when treatment first starts. How long you need VAC depends on the size and type of wound you have and how well the [facility] works. You will be limited in what you can do while the wound heals. You will use VAC 24 hours a day.   Follow-up care is a key part of your treatment and safety. Be sure to make and go to all appointments, and call your doctor if you are having problems. It's also a good idea to know your test results and keep a list of the medicines you take. How can you care for yourself at home? · A home health care worker will come to your home a few times a week to change the bandage and check the machine. You may need it changed more often if there is a lot of drainage. · Your doctor will give you information on what you can and can't do. This depends on where your wound is located. Your activities may be limited during the time you're using VAC. · You will be able to take sponge baths. Don't shower or take baths unless your doctor says it is okay. · Take pain medicines exactly as directed. ¨ If the doctor gave you a prescription medicine for pain, take it as prescribed. ¨ If you are not taking a prescription pain medicine, ask your doctor if you can take an over-the-counter medicine. · If your doctor prescribed antibiotics, take them as directed. Do not stop taking them just because you feel better. You need to take the full course of antibiotics. When should you call for help? Call 911 anytime you think you may need emergency care. For example, call if:    · You have a lot of bleeding or see a sudden change in the color or texture of the drainage.     · The wound splits open and organs under the skin can be seen (evisceration).    Call your doctor now or seek immediate medical care if:    · The wound starts bleeding.     · The bandage comes off. Cover the area with a sterile bandage until you can see your doctor or your home health care worker comes by.     · You have signs of infection, such as:  ¨ Increased pain, swelling, warmth, or redness around the wound. ¨ Red streaks leading from the wound. ¨ Pus draining from the wound.   ¨ A fever.    Watch closely for changes in your health, and be sure to contact your doctor if:    · The noise the machine makes changes or gets very loud. This may mean the seal is broken or the machine is not producing enough suction. Where can you learn more? Go to http://macrina-thuy.info/. Enter D370 in the search box to learn more about \"Vacuum-Assisted Closure for Wound Healing: Care Instructions. \"  Current as of: November 21, 2017  Content Version: 11.8  © 5116-0378 [employer]. Care instructions adapted under license by [employer] (which disclaims liability or warranty for this information). If you have questions about a medical condition or this instruction, always ask your healthcare professional. [place] [address] any warranty or liability for your use of this information.

## 2018-10-10 NOTE — PROGRESS NOTES
Pt seen for wound vac dressing change of left knee. Removed old wound vac dressing and underlining adaptic dressing. Cleansed with wound cleanser. Wound bed is red with granulation tissue noted. Applied new adaptic dressing to wound bed and covered wound with new  Wound vac dressing. Attached to wound vac machine, no leaks. Pt tolerated well. Will continue on MWF scheduled dressing changed. Will continue to monitor.

## 2018-10-10 NOTE — DISCHARGE SUMMARY
Hospitalist Discharge Summary Patient ID: 
Brown Moreno [id number] 
79 y.o. 
1950 Admit date: 10/6/2018  3:23 PM 
Discharge date and time: 10/10/2018 Attending: Jamaal Archer DO 
PCP:  Anurag Bacon MD 
Treatment Team: Attending Provider: Jamaal Archer DO; Hospitalist: Gomze Wiggins NP; Utilization Review: Junious Amen; Surgeon: María Monae MD; Care Manager: Oscar Maurice RN 
 
Principal Diagnosis Abscess Principal Problem: 
  Abscess (10/6/2018) Active Problems: 
  MDS (myelodysplastic syndrome) ([facility] [address] (12/17/2011) Overview: Procrit started in August, 2011 12/18/11 Procrit weekly and Iron stores. 5-12 12-13-12 good response to 3 weekly procrit did not need it last time 3-7-13 Pt doing well. Just wanted a \"check-up. \" Responding to Procrit  
    every three weeks. 8-29-13 patient has missed some injections on recently restarted  
    hemoglobin only issue , takes oral iron iron stores each time CAD (coronary artery disease) (1/20/2013) Overview: 5/8/14 PCI LAD with stent placed Chronic combined systolic and diastolic heart failure ([facility] [address] (1/20/2013) Overview: 5/8/14 ECHO:  EF 10-15% 12/2017:  EF 25-30% Essential hypertension, benign (1/20/2013) Anticoagulated on Coumadin (7/9/2013) Overview: S/P AVR 
 
  CKD (chronic kidney disease) stage 3, GFR 30-59 ml/min ([facility]) (7/10/2013) COPD (chronic obstructive pulmonary disease) [facility] (4/2/2014) Overview: HOME OXYGEN 3 LITERS 
 
  ERJI (obstructive sleep apnea)-cpap (4/2/2014) Diabetes mellitus type 2, diet-controlled ([facility] [address] (8/28/2014) Overview: A1C: 5.7 ICD (implantable cardioverter-defibrillator) in place (10/2/2014) Overview: Biotron[employer] single-chamber ICD implantation 10/20/14 S/P AVR (aortic valve replacement) () Overview: Mechanical/Artific Cardiomyopathy ([facility] [address] () Pulmonary hypertension ([facility] [address] (6/15/2016) Physical debility (5/2/2018) Obesity, morbid ([facility] [address] (6/8/2018) Anemia (9/7/2018) Chronic respiratory failure with hypoxia ([facility] [address] (9/7/2018) Traumatic hematoma of left knee (9/8/2018) History of incision and drainage (10/6/2018) Open wound of left lower extremity with complication (88/3/9010) Hospital Course: 
Please refer to the admission H&P for details of presentation. In summary, the patient is a 69yoF with extensive PMH who was admitted to the hospital on 10/6 with non-healing wound of L knee. Initial fall occurred in August with large L knee hematoma and hgb of 6.7. She underwent transfusion. She was at [facility] and had been improving functionally, but unfortunately, this wound did not heal.  On admission here, patient was started on broad spectrum abx. Patient's wound cx has grown P.mirabilis. Dr. Carisa Salinas has been following her care throughout her stay and arranged for wound vac placement on 10/9/18. ID transitioned her to PO ciprofloxacin and clindamycin based on susceptibilities. PT worked with the patient, and she did very well, and she did not meet criteria for STR for loss of strength. Patient is agreeable to be discharged home with [facility] services, including wound vac care. Patient will complete a 10-14 day course of PO cipro/clinda for P.mirabilis infection, and she will f/u with Dr. Carisa Salinas as Wound Healing Clinic as well. Significant Diagnostic Studies:  
 
 
Labs: Results:  
   
Chemistry Recent Labs 10/10/18 
 0402  10/09/18 
 6778  10/08/18 
 1970 GLU  112*  80  97 NA  141  140  140  
K  4.8  4.1  4.2 CL  103  101  100 CO2  32  34*  36* BUN  38*  40*  38* CREA  1.67*  1.84*  1.81* CA  8.7  8.8  9.4 AGAP  6*  5*  4* AP   --    --   47* TP   --    --   6.8 ALB   --    --   2.8*  
GLOB   --    --   4.0* AGRAT   --    --   0.7* CBC w/Diff Recent Labs 10/09/18 0499  10/08/18 
 4307 WBC  7.1  7.7 RBC  3.17*  3.32* HGB  9.1*  9.5* HCT  29.7*  31.5* PLT  177  187 Cardiac Enzymes No results for input(s): CPK, CKND1, MAY in the last 72 hours. No lab exists for component: Morris Kelly Coagulation Recent Labs 10/10/18 
 0402  10/09/18 
 3328 PTP  24.2*  24.3* INR  2.3  2.3 APTT  60.2*  56.2* Lipid Panel Lab Results Component Value Date/Time Cholesterol, total 133 08/21/2018 11:57 AM  
 HDL Cholesterol 63 08/21/2018 11:57 AM  
 LDL, calculated 59 08/21/2018 11:57 AM  
 VLDL, calculated 11 08/21/2018 11:57 AM  
 Triglyceride 54 08/21/2018 11:57 AM  
 CHOL/HDL Ratio 2.7 02/16/2017 05:15 AM  
  
BNP No results for input(s): BNPP in the last 72 hours. Liver Enzymes Recent Labs 10/08/18 
 0426  
TP  6.8 ALB  2.8* AP  47* SGOT  11* Thyroid Studies Lab Results Component Value Date/Time TSH 3.060 11/28/2017 10:48 AM  
    
 
 
Discharge Exam: 
Visit Hayder Lee  BP 96/53  Pulse 68  Temp 97.6 °F (36.4 °C)  Resp 18  Ht 5' 2\" (1.575 m)  Wt 94.8 kg (209 lb)  SpO2 95%  BMI 38.23 kg/m2 General appearance: alert, cooperative, no distress, appears stated age Lungs: clear to auscultation bilaterally Heart: regular rate and rhythm, S1, S2 normal, no murmur, click, rub or gallop Abdomen: soft, non-tender. Bowel sounds normal. No masses,  no organomegaly Extremities: no cyanosis or edema. Wound vac in place over L knee. Neurologic: Grossly normal 
 
Disposition: home Discharge Condition: stable Patient Instructions:  
Current Discharge Medication List  
  
START taking these medications Details  
ciprofloxacin HCl (CIPRO) 500 mg tablet Take 1 Tab by mouth two (2) times a day. Qty: 28 Tab, Refills: 0  
  
clindamycin (CLEOCIN) 300 mg capsule Take 1 Cap by mouth three (3) times daily for 14 days. Qty: 42 Cap, Refills: 0 CONTINUE these medications which have NOT CHANGED Details warfarin (COUMADIN) 2.5 mg tablet Take 2.5 mg by mouth nightly. docusate sodium (COLACE) 50 mg capsule Take 50 mg by mouth daily. carvedilol (COREG) 6.25 mg tablet Take 1 Tab by mouth two (2) times daily (with meals). Qty: 60 Tab, Refills: 3 Associated Diagnoses: Anemia, unspecified type; Acute kidney injury superimposed on chronic kidney disease ([address] Coronary artery disease involving native coronary artery of native heart without angina pectoris; Chronic combined systolic and diastolic heart failure ([address] Chronic respiratory failure with hypoxia ([address] Debility; MDS (myelodysplastic syndrome) ([facility] [address] Type 2 diabetes mellitus with nephropathy ([facility] [address] Essential hypertension, benign; Anticoagulated on Coumadin; CKD (chronic kidney disease) stage 3, GFR 30-59 ml/min ([facility] [address] Chronic obstructive pulmonary disease, unspecified COPD type ([facility] [address] REJI (obstructive sleep apnea); Diabetes mellitus type 2, diet-controlled ([facility] [address] ICD (implantable cardioverter-defibrillator) in place; S/P AVR (aortic valve replacement); Obesity, morbid ([address] Coagulopathy ([address] Traumatic hematoma of left knee, sequela; Iron deficiency anemia due to chronic blood loss; Osteopenia, unspecified location; Mixed hyperlipidemia; Osteoarthritis of shoulder, unspecified laterality, unspecified osteoarthritis type; Cardiomyopathy, unspecified type ([address] Pulmonary hypertension ([address] Dysthymia; Long term (current) use of anticoagulants; Subdural hematoma ([address] Closed nondisplaced fracture of shaft of fifth metacarpal bone of left hand, sequela; Physical debility; Encounter for immunization  
  
furosemide (LASIX) 40 mg tablet Take 1 Tab by mouth daily. Qty: 30 Tab, Refills: 6 Associated Diagnoses: Essential hypertension, benign  
  
traZODone (DESYREL) 50 mg tablet Take 0.5-1 Tabs by mouth nightly. Qty: 60 Tab, Refills: 2 Associated Diagnoses: REJI (obstructive sleep apnea) spironolactone (ALDACTONE) 25 mg tablet Take 1 Tab by mouth two (2) times a day. Pill bottles states 3.5 daily but patient is taking one in am and one in pm 
Qty: 180 Tab, Refills: 3  
  
simvastatin (ZOCOR) 20 mg tablet TAKE 1 TABLET BY MOUTH EVERY DAY Qty: 90 Tab, Refills: 0 Comments: FAXED Associated Diagnoses: Mixed hyperlipidemia  
  
traMADol (ULTRAM) 50 mg tablet TAKE 1 TABLET BY MOUTH EVERY 4 HOURS AS NEEDED Qty: 180 Tab, Refills: 1 Comments: Not to exceed 4 additional fills before 03/07/2018 Associated Diagnoses: Osteoarthritis of shoulder, unspecified laterality, unspecified osteoarthritis type  
  
escitalopram oxalate (LEXAPRO) 20 mg tablet TAKE 1 TAB BY MOUTH DAILY. INDICATIONS: ANXIETY WITH DEPRESSION Qty: 90 Tab, Refills: 4 Associated Diagnoses: Dysthymia  
  
multivit-minerals/folic acid (ADULT ONE DAILY MULTIVITAMIN PO) Take 1 Tab by mouth daily. ascorbic acid, vitamin C, (VITAMIN C) 500 mg tablet Take 500 mg by mouth daily. aspirin delayed-release 81 mg tablet Take 81 mg by mouth daily. ferrous gluconate 324 mg (38 mg iron) tablet TAKE 1 TAB BY MOUTH DAILY (BEFORE BREAKFAST). INDICATIONS: IRON DEFICIENCY ANEMIA Qty: 90 Tab, Refills: 3 Associated Diagnoses: Iron deficiency anemia due to chronic blood loss  
  
fluticasone (FLONASE) 50 mcg/actuation nasal spray 2 Sprays by Both Nostrils route daily as needed. Qty: 3 Bottle, Refills: 3  
  
loratadine (CLARITIN) 10 mg tablet TAKE 1 TAB BY MOUTH DAILY. Qty: 30 Tab, Refills: 1 Associated Diagnoses: Upper respiratory tract infection, unspecified type  
  
mometasone-formoterol (DULERA) 200-5 mcg/actuation HFA inhaler 2 puffs bid, rinse mouth after use. Qty: 1 Inhaler, Refills: 11  
 Associated Diagnoses: Pulmonary hypertension ([address]  Chronic respiratory failure with hypoxia (HCC)  
  
albuterol (PROVENTIL VENTOLIN) 2.5 mg /3 mL (0.083 %) nebulizer solution 3 mL by Nebulization route every four (4) hours as needed for Wheezing. Qty: 120 Each, Refills: 11  
 Associated Diagnoses: Pulmonary hypertension ([address] Chronic respiratory failure with hypoxia (HCC)  
  
albuterol (VENTOLIN HFA) 90 mcg/actuation inhaler 2 puffs qid prn 
Qty: 1 Inhaler, Refills: 11  
 Associated Diagnoses: Pulmonary hypertension ([address] Chronic respiratory failure with hypoxia (HCC)  
  
cholecalciferol, VITAMIN D3, (VITAMIN D3) 5,000 unit tab tablet Take 1 Tab by mouth daily. Qty: 90 Tab, Refills: 4 OXYGEN-AIR DELIVERY SYSTEMS 3 L by Nasal route continuous. sacubitril-valsartan (ENTRESTO) 24 mg/26 mg tablet Take 1 Tab by mouth two (2) times a day. INDICATIONS: CHRONIC HEART FAILURE Qty: 60 Tab, Refills: 6  
  
acetaminophen (TYLENOL) 325 mg tablet Take 650 mg by mouth every four (4) hours as needed for Pain. nitroglycerin (NITROSTAT) 0.4 mg SL tablet 0.4 mg by SubLINGual route every five (5) minutes as needed for Chest Pain. STOP taking these medications  
  
 isosorbide mononitrate ER (IMDUR) 30 mg tablet Comments:  
Reason for Stopping:   
   
 calcium citrate 200 mg (950 mg) tablet Comments:  
Reason for Stopping:   
   
 umeclidinium (INCRUSE ELLIPTA) 62.5 mcg/actuation inhaler Comments:  
Reason for Stopping:   
   
  
 
 
Activity: Activity as tolerated Diet: Cardiac Diet Wound Care: As directed Follow-up ·  PCP in 3-5 days ·  Dr. Julia Mahmood at [address] [place] [address] Road as directed Time spent to discharge patient 35 minutes Signed: 
Sonja Angelucci, MD 
10/10/2018 
2:29 PM

## 2018-10-10 NOTE — PROGRESS NOTES
DC'd from unit with belongings and Rx. Patient accompanied by family and hospital personnel. Vitals stable and no distress at time of discharge. Transported via wheelchair.

## 2018-10-10 NOTE — PROGRESS NOTES
Consult Note/Progress Note:  
Brown Moreno  MRN: [id number]  UCO:[birth year]  Age:68 y.o. General Surgery Consult ordered by: Gomez Wiggins NP Reason for General Surgery Consult: Eval wound to Left knee - S/P I+D hematoma left knee after traumatic injury 9/11 with non healing wound since As previously noted HPI: Brown Moreno is a 77 y.o. female \"with extensive medical history as noted below on chronic coumadin who initially presented to ER 9/7 after fall in bathroom 8/28 where she landed directly on her left knee with complaints of dizziness and fatigue and found to have Hgb of 6.7. She was transfused to Hgb of 9.6. She also was found with a large hematoma/bullae over her left knee and INR of 4.3. She underwent an I+D per Dr Devorah Bland on 9/11 with a large amount of serosanguinous fluid evacuated. She has been followed by home health PT, OT and wound care since discharge from rehab on ninth floor 9/11-9/21. She has done well except the wound has not closed. Two days ago, [facility] RN and daughter who is an RN noted mild erythema and heat surrounding upper half of wound. Open area approx 5-6 cm in circumference. No purulent drainage, oozing small amounts of dark blood with slightly foul odor. Full ROM to knee. All distal circulation, motion and sensation WNL for patient. No distal edema, mild local edema. Multiple old scars from prior lower extremity wounds. Patient denies pain to area, fever, systemic symptoms. She does not appear septic and lactic acid is 1.8. WBC is 11.6 without shift. Wound cultured in ER prior to administration of antibiotics. Begun on vancomycin and ceftriaxone until cultures resulted. Large, attentive family at bedside. Creatinine 1.48 on admission with baseline of 1.2. Of note, patient reports diarrhea x 4 days without use of antibiotics, additional GI symptoms. \"   
 
 
10/7/18: Pt sitting up on side of the bed just finishing breakfast. No complaints. AF, NAD. WBC 8.8, Pt taking Coumadin 2.5mg qhs. INR 3.0. 
10/8/18: POD1 s/p left knee debridement; awake in bed, no complaints. AF, VSS. Cx growing P. Mirabilis. 10/9/18: POD2 s/p left knee debridement; awake in chair, no complaints. AF, VSS. Cx growing P. Mirabilis. Vac placed today and functioning without leak 10/10/18: POD3 s/p left knee debridement; awake in chair, no complaints. AF, VSS. Cx growing P. Mirabilis. Vac functioning without leak or bleeding Past Medical History:  
Diagnosis Date  Anticoagulated on Coumadin 2013 S/P AVR  CAD (coronary artery disease) 2013 PCI LAD with stent placed  Cardiomy[facility]  CHF (congestive heart failure) ([facility] 2017  CKD (chronic kidney disease) stage 3, GFR 30-59 ml/min ([facility]) 7/10/2013  COPD (chronic obstructive pulmonary disease) ([facility] 2014  Essential hypertension, benign 2013  HLD (hyperlipidemia)  ICD (implantable cardioverter-defibrillator) in place 10/2/2014 Biotronik single-chamber ICD implantation 10/20/14  Iron deficiency anemia due to chronic blood loss 2009  MDS (myelodysplastic syndrome) ([facility] 2011 Procrit started in 11 Procrit weekly and Iron stores. 12 good response to 3 weekly procrit did not need it last time  3-- Pt doing well. Just wanted a \"check-up. \" Responding to Procrit every three weeks. 13 patient has missed some injections on recently restarted hemoglobin only issue , takes oral iron iron stores each time  REJI (obstructive sleep apnea)-cpap 2014  Osteoarthritis  Osteopenia  Quadrantanopia  Visual complaint 2015 Past Surgical History:  
Procedure Laterality Date [address]    HX AORTIC VALVE REPLACEMENT  ,   
 mechanical valve   HX  SECTION    
 HX CORONARY STENT PLACEMENT  May, 2014 STEMI with Stent placement.  HX ENDOSCOPY  7/2009 EGD [address] CATHETERIZATION  2013  HX PACEMAKER  10/2/2014 Biotronik ICD  HX TUBAL LIGATION    
 IR BX BONE MARROW DIAGNOSTIC  7/2011 Current Facility-Administered Medications Medication Dose Route Frequency  ciprofloxacin HCl (CIPRO) tablet 500 mg  500 mg Oral Q12H  clindamycin (CLEOCIN) capsule 300 mg  300 mg Oral TID  warfarin (COUMADIN) tablet 2.5 mg  2.5 mg Oral QHS  docusate sodium (COLACE) capsule 100 mg  100 mg Oral BID  traZODone (DESYREL) tablet 100 mg  100 mg Oral QHS  sodium chloride (NS) flush 5-10 mL  5-10 mL IntraVENous Q8H  
 sodium chloride (NS) flush 5-10 mL  5-10 mL IntraVENous PRN  
 acetaminophen (TYLENOL) tablet 650 mg  650 mg Oral Q4H PRN  
 traMADol (ULTRAM) tablet 50 mg  50 mg Oral Q6H PRN  
 traMADol (ULTRAM) tablet 100 mg  100 mg Oral Q6H PRN  
 insulin lispro (HUMALOG) injection   SubCUTAneous AC&HS  
 albuterol (PROVENTIL VENTOLIN) nebulizer solution 2.5 mg  2.5 mg Nebulization Q4H PRN  
 ascorbic acid (vitamin C) (VITAMIN C) tablet 500 mg  500 mg Oral DAILY  aspirin delayed-release tablet 81 mg  81 mg Oral DAILY  calcium carbonate (TUMS) chewable tablet 200 mg [elemental]  200 mg Oral BID  carvedilol (COREG) tablet 6.25 mg  6.25 mg Oral BID WITH MEALS  cholecalciferol (VITAMIN D3) tablet 2,000 Units  2,000 Units Oral DAILY  escitalopram oxalate (LEXAPRO) tablet 20 mg  20 mg Oral DAILY  ferrous gluconate 324 mg (38 mg iron) tablet 1 Tab  1 Tab Oral BID  fluticasone (FLONASE) 50 mcg/actuation nasal spray 2 Spray  2 Spray Both Nostrils DAILY PRN  
 furosemide (LASIX) tablet 40 mg  40 mg Oral DAILY  isosorbide mononitrate ER (IMDUR) tablet 30 mg  30 mg Oral DAILY  loratadine (CLARITIN) tablet 10 mg  10 mg Oral DAILY PRN  
 multivitamin, tx-iron-ca-min (THERA-M w/ IRON) tablet 1 Tab  1 Tab Oral DAILY  sacubitril-valsartan (ENTRESTO) 24-26 mg tablet 1 Tab  1 Tab Oral BID  
  simvastatin (ZOCOR) tablet 20 mg  20 mg Oral QHS  spironolactone (ALDACTONE) tablet 25 mg  25 mg Oral BID  tiotropium (SPIRIVA) inhalation capsule 18 mcg  18 mcg Inhalation DAILY  budesonide (PULMICORT) 500 mcg/2 ml nebulizer suspension  500 mcg Nebulization BID RT And  
 albuterol (PROVENTIL VENTOLIN) nebulizer solution 2.5 mg  2.5 mg Nebulization Q6HWA RT  
 influenza vaccine 2018-19 (6 mos+)(PF) (FLUARIX QUAD/FLULAVAL QUAD) injection 0.5 mL  0.5 mL IntraMUSCular PRIOR TO DISCHARGE  alcohol 62% (NOZIN) nasal  1 Ampule  1 Ampule Topical Q12H Sulfa (sulfonamide antibiotics) and Aspirin Social History Social History  Marital status:  Spouse name: N/A  
 Number of children: N/A  
 Years of education: N/A Occupational History   Retired  
  [employer] Social History Main Topics  Smoking status: Former Smoker Packs/day: 0.25 Years: 42.00 Types: Cigarettes Start date: 10/1/1956 Quit date: 5/1/2014  Smokeless tobacco: Never Used  Alcohol use No  
 Drug use: No  
 Sexual activity: Not Asked Other Topics Concern  Weight Concern Yes  Special Diet Yes Social History Narrative Lives with her daughter. Ambulates only short distances. History Smoking Status  Former Smoker  Packs/day: 0.25  
 Years: 42.00  Types: Cigarettes  Start date: 10/1/1956  Quit date: 5/1/2014 Smokeless Tobacco  
 Never Used Family History Problem Relation Age of Onset  Heart Disease Mother CHF  Hypertension Mother  Kidney Disease Mother  Heart Disease Father CHF  Lung Disease Father  Diabetes Father  Cancer Father   
  prostate  Hypertension Father  Heart Attack Father  Heart Disease Maternal Aunt  Diabetes Brother  Coronary Artery Disease Other  Breast Cancer Neg Hx   
 
ROS: The patient has no difficulty with chest pain or shortness of breath. No fever or chills. Comprehensive review of systems was otherwise unremarkable except as noted above. Physical Exam:  
Visit Vitals  /69  Pulse 73  Temp 98.1 °F (36.7 °C)  Resp 18  Ht 5' 2\" (1.575 m)  Wt 209 lb (94.8 kg)  SpO2 98%  BMI 38.23 kg/m2 Vitals:  
 10/09/18 2121 10/09/18 2305 10/10/18 0310 10/10/18 0153 BP:  134/74 116/75 115/69 Pulse:  69 71 73 Resp:  17 17 18 Temp:  98.8 °F (37.1 °C) 97.3 °F (36.3 °C) 98.1 °F (36.7 °C) SpO2: 94% 98% 98% 98% Weight:      
Height:      
 
  
10/08 1901 - 10/10 0700 In: 2584 [I.V.:2584] Out: 1960 [JBLUO:5832; Drains:10] Constitutional: Obese, Alert, cooperative, no acute distress; appears stated age Eyes:Sclera are clear. EOMs intact ENMT: no external lesions gross hearing normal; no obvious neck masses, no ear or lip lesions, nares normal 
CV: RRR. Normal perfusion Resp: No JVD. Breathing is  non-labored; no audible wheezing. GI: soft, non-tender, non-distended Integument: Left knee with wound vac in place Musculoskeletal: No embolic signs or cyanosis. Neuro:  Oriented; moves all 4; no focal deficits Psychiatric: normal affect and mood, no memory impairment Recent vitals (if inpt): 
Patient Vitals for the past 24 hrs: 
 BP Temp Pulse Resp SpO2  
10/10/18 0652 115/69 98.1 °F (36.7 °C) 73 18 98 % 10/10/18 0310 116/75 97.3 °F (36.3 °C) 71 17 98 % 10/09/18 2305 134/74 98.8 °F (37.1 °C) 69 17 98 % 10/09/18 2121 - - - - 94 % 10/09/18 1928 - - - - 94 % 10/09/18 1907 112/70 98.7 °F (37.1 °C) 75 17 98 % 10/09/18 1508 108/65 98.3 °F (36.8 °C) 74 17 93 % 10/09/18 1342 - - - - 96 % 10/09/18 1043 99/65 98 °F (36.7 °C) 72 18 91 % Labs: 
Recent Labs 10/10/18 
 0402  10/09/18 
 4524  10/08/18 
 5777 WBC   --   7.1  7.7 HGB   --   9.1*  9.5* PLT   --   177  187 NA  141  140  140  
K  4.8  4.1  4.2 CL  103  101  100 CO2  32  34*  36* BUN  38*  40*  38*  
 CREA  1.67*  1.84*  1.81* GLU  112*  80  97 PTP  24.2*  24.3*  25.8* INR  2.3  2.3  2.5 APTT  60.2*  56.2*  55.0* TBILI   --    --   0.4 SGOT   --    --   11* ALT   --    --   11* AP   --    --   47* LPSE   --    --   157 Lab Results Component Value Date/Time WBC 7.1 10/09/2018 04:14 AM  
 HGB 9.1 (L) 10/09/2018 04:14 AM  
 PLATELET 284 57/57/5547 04:14 AM  
 Sodium 141 10/10/2018 04:02 AM  
 Potassium 4.8 10/10/2018 04:02 AM  
 Chloride 103 10/10/2018 04:02 AM  
 CO2 32 10/10/2018 04:02 AM  
 BUN 38 (H) 10/10/2018 04:02 AM  
 Creatinine 1.67 (H) 10/10/2018 04:02 AM  
 Glucose 112 (H) 10/10/2018 04:02 AM  
 INR 2.3 10/10/2018 04:02 AM  
 aPTT 60.2 (H) 10/10/2018 04:02 AM  
 Bilirubin, total 0.4 10/08/2018 04:26 AM  
 AST (SGOT) 11 (L) 10/08/2018 04:26 AM  
 ALT (SGPT) 11 (L) 10/08/2018 04:26 AM  
 Alk. phosphatase 47 (L) 10/08/2018 04:26 AM  
 Amylase 88 01/31/2012 03:12 PM  
 Lipase 157 10/08/2018 04:26 AM  
 Lactic acid 0.7 02/16/2016 06:16 AM  
 Troponin-I <0.05 01/31/2012 03:32 PM  
 Troponin-I, Qt. 0.08 (H) 12/01/2016 02:20 AM  
 
 
10/6/18: Two-view left knee x-ray 
  
Reference exam: September 7, 2018 
  
INDICATION: Diabetic with open wound 
  
FINDINGS: 2 images are presented, soft tissue edema and irregularity is seen 
along the inferior patellar tendon anteriorly but there is no joint effusion 
seen and no bony injury identified. 
  
IMPRESSION: Soft tissue injury seen but no bony injury noted. If osteomyelitis 
is suspected MRI should be considered. I reviewed recent labs and recent radiologic studies. I independently reviewed radiology images for studies I described above or studies I have ordered. Admission date (for inpatients): 10/6/2018 * No surgery found *  * No surgery found * ASSESSMENT/PLAN: 
Problem List  Date Reviewed: 10/6/2018 Codes Class Noted Open wound of left lower extremity with complication [id number]: D67.916G ICD-9-CM: 891.1  10/7/2018 * (Principal)Abscess ICD-10-CM: L02.91 
ICD-9-CM: 682.9  10/6/2018 History of incision and drainage ICD-10-CM: Z98.890 ICD-9-CM: V45.89  10/6/2018 Traumatic hematoma of left knee (Chronic) ICD-10-CM: L08.83FX ICD-9-CM: 924.11  9/8/2018 Debility ICD-10-CM: R53.81 ICD-9-CM: 799.3  9/8/2018 Anemia (Chronic) ICD-10-CM: D64.9 ICD-9-CM: 285.9  9/7/2018 Chronic respiratory failure with hypoxia (HCC) (Chronic) ICD-10-CM: J96.11 
ICD-9-CM: 518.83, 799.02  9/7/2018 Encounter for immunization ICD-10-CM: O72 ICD-9-CM: V03.89  8/21/2018 Obesity, morbid ([facility] (Chronic) ICD-10-CM: E66.01 
ICD-9-CM: 278.01  6/8/2018 Physical debility (Chronic) ICD-10-CM: R53.81 ICD-9-CM: 799.3  5/2/2018 Closed nondisplaced fracture of shaft of fifth metacarpal bone of left hand ICD-10-CM: A91.147L ICD-9-CM: 815.03  4/28/2018 Subdural hematoma [facility] ICD-10-CM: S78.5E5R 
ICD-9-CM: 432.1  4/27/2018 Long term (current) use of anticoagulants (Chronic) ICD-10-CM: Z79.01 
ICD-9-CM: V58.61  4/19/2018 Dysthymia ICD-10-CM: F34.1 ICD-9-CM: 300.4  4/5/2018 Type 2 diabetes mellitus with nephropathy (HCC) (Chronic) ICD-10-CM: E11.21 
ICD-9-CM: 250.40, 583.81  12/18/2017 Pulmonary hypertension (HCC) (Chronic) ICD-10-CM: I27.20 ICD-9-CM: 416.8  6/15/2016 S/P AVR (aortic valve replacement) (Chronic) ICD-10-CM: Z95.2 ICD-9-CM: V43.3  Unknown Overview Signed 2/23/2016 11:04 AM by Liam Hand Mechanical/Artific Cardiomyopathy ([facility] (Chronic) ICD-10-CM: I42.9 ICD-9-CM: 425.4  Unknown Osteopenia ICD-10-CM: M85.80 ICD-9-CM: 733.90  Unknown HLD (hyperlipidemia) (Chronic) ICD-10-CM: V42.3 ICD-9-CM: 272.4  Unknown Osteoarthritis ICD-10-CM: M19.90 ICD-9-CM: 715.90  Unknown  
   
 ICD (implantable cardioverter-defibrillator) in place ICD-10-CM: Z95.810 ICD-9-CM: V45.02  10/2/2014 Overview Signed 10/2/2014  4:58 PM by Teddy Reeves III Biotronik single-chamber ICD implantation 10/20/14 Diabetes mellitus type 2, diet-controlled (HCC) (Chronic) ICD-10-CM: E11.9 ICD-9-CM: 250.00  8/28/2014 Overview Signed 10/6/2018  8:56 PM by Brittany Daley NP  
  A1C: 5.7 COPD (chronic obstructive pulmonary disease) (HCC) (Chronic) ICD-10-CM: J44.9 ICD-9-CM: 139  4/2/2014 Overview Signed 10/6/2018  8:56 PM by Brittany Daley NP  
  HOME OXYGEN 3 LITERS 
  
  
   
 REJI (obstructive sleep apnea)-cpap (Chronic) ICD-10-CM: U63.77 
ICD-9-CM: 327.23  4/2/2014 CKD (chronic kidney disease) stage 3, GFR 30-59 ml/min (HCC) (Chronic) ICD-10-CM: N18.3 ICD-9-CM: 585.3  7/10/2013 Anticoagulated on Coumadin (Chronic) ICD-10-CM: Z51.81, Z79.01 
ICD-9-CM: V58.83, V58.61  7/9/2013 Overview Signed 7/9/2013  4:16 PM by Saud Justin NP  
  S/P AVR 
  
  
   
 CAD (coronary artery disease) (Chronic) ICD-10-CM: I25.10 ICD-9-CM: 414.00  1/20/2013 Overview Signed 5/20/2014 10:05 AM by Kimo Apple NP  
  5/8/14 PCI LAD with stent placed Chronic combined systolic and diastolic heart failure (HCC) (Chronic) ICD-10-CM: I50.42 
ICD-9-CM: 428.42  1/20/2013 Overview Addendum 4/28/2018  4:53 AM by Emelina Echavarria MD  
  5/8/14 ECHO:  EF 10-15% 12/2017:  EF 25-30% Essential hypertension, benign (Chronic) ICD-10-CM: I10 
ICD-9-CM: 401.1  1/20/2013 MDS (myelodysplastic syndrome) (HCC) (Chronic) ICD-10-CM: D46.9 ICD-9-CM: 238.75  12/17/2011 Overview Addendum 8/29/2013 11:06 AM by Riri Kincaid Procrit started in August, 2011 12/18/11 Procrit weekly and Iron stores. 5-12 12-13-12 good response to 3 weekly procrit did not need it last time 3-7-13 Pt doing well. Just wanted a \"check-up. \" Responding to Procrit every three weeks. 8-29-13 patient has missed some injections on recently restarted hemoglobin only issue , takes oral iron iron stores each time Iron deficiency anemia due to chronic blood loss (Chronic) ICD-10-CM: D50.0 ICD-9-CM: 280.0  7/29/2009 Principal Problem: 
  Abscess (10/6/2018) Active Problems: 
  MDS (myelodysplastic syndrome) ([address] (12/17/2011) Overview: Procrit started in August, 2011 12/18/11 Procrit weekly and Iron stores. 5-12 12-13-12 good response to 3 weekly procrit did not need it last time 3-7-13 Pt doing well. Just wanted a \"check-up. \" Responding to Procrit  
    every three weeks. 8-29-13 patient has missed some injections on recently restarted  
    hemoglobin only issue , takes oral iron iron stores each time CAD (coronary artery disease) (1/20/2013) Overview: 5/8/14 PCI LAD with stent placed Chronic combined systolic and diastolic heart failure ([address] (1/20/2013) Overview: 5/8/14 ECHO:  EF 10-15% 12/2017:  EF 25-30% Essential hypertension, benign (1/20/2013) Anticoagulated on Coumadin (7/9/2013) Overview: S/P AVR 
 
  CKD (chronic kidney disease) stage 3, GFR 30-59 ml/min ([facility]) (7/10/2013) COPD (chronic obstructive pulmonary disease) ([address] (4/2/2014) Overview: HOME OXYGEN 3 LITERS 
 
  REJI (obstructive sleep apnea)-cpap (4/2/2014) Diabetes mellitus type 2, diet-controlled ([address] (8/28/2014) Overview: A1C: 5.7 ICD (implantable cardioverter-defibrillator) in place (10/2/2014) Overview: Biotronik single-chamber ICD implantation 10/20/14 S/P AVR (aortic valve replacement) () Overview: Mechanical/Artific Cardiomyopathy ([address] () Pulmonary hypertension ([address] (6/15/2016) Physical debility (5/2/2018) Obesity, morbid ([address] (6/8/2018) Anemia (9/7/2018) Chronic respiratory failure with hypoxia ([address] (9/7/2018) Traumatic hematoma of left knee (9/8/2018) History of incision and drainage (10/6/2018) Open wound of left lower extremity with complication (33/9/7733) Plan: 
Care Management per Hospitalist 
IV Abx - per ID Cipro/Clinda Cx growing P. Mirabilis Wound care following for Vac Ok to d/c from surgical standpoint--if today will need VAC change before she leaves Signed: Maura Sebastian NP I have seen and examined the patient with the Nurse Practitioner and agree with the above assessment and plan.  for VAC changes and I will follow her up in the [address] [place] [address].  Sima Kayser, MD

## 2018-10-10 NOTE — PROGRESS NOTES
Warfarin dosing per pharmacist 
 
Osito Marcial Pedrito Diego is a 77 y.o. female. Height: 5' 2\" (157.5 cm)    Weight: 94.8 kg (209 lb) Indication:  AVR Goal INR:  2-3 Home dose:  2.5 mg daily Risk factors or significant drug interactions:  Cipro PO (started 10/9), Clinda PO (started 10/9) Other anticoagulants:  N/A Daily Monitoring Date  INR     Warfarin dose HGB              Notes 10/6  2.6  2.5 mg  10.9 
10/7  3  2 mg  9 
10/8  2.5  2.5 mg  9.5 
10/9  2.3  2.5 mg  9.1 
10/10  2.3  2.5 mg  --- The patient is followed by a warfarin clinic which she had a meeting with on 10/5 with an INR of 2.6. INR 2.3. Continue home dose of 2.5 mg qhs. Oral cipro started 10/9 - monitor for interaction. Continue daily INR while also on cipro. Pharmacy will continue to follow. Please call with any questions. Thank you, Romana Wheeler, PharmD Clinical Pharmacist 
[phone]

## 2018-10-10 NOTE — PROGRESS NOTES
Dispo update:  Spoke to Dr. Tolu Cueva MD (surgeon). Left knee wound 6.0 cm long, 6.0 cm wide, and 0.3 cm deep. [facility] wound vac order faxed to [phone].

## 2018-10-10 NOTE — PROGRESS NOTES
Problem: Falls - Risk of 
Goal: *Absence of Falls Document Fabienne Hill Fall Risk and appropriate interventions in the flowsheet. Outcome: Progressing Towards Goal 
Fall Risk Interventions: 
Mobility Interventions: Patient to call before getting OOB Medication Interventions: Patient to call before getting OOB Elimination Interventions: Call light in reach History of Falls Interventions: Door open when patient unattended

## 2018-10-11 ENCOUNTER — PATIENT OUTREACH (OUTPATIENT)
Dept: CASE MANAGEMENT | Age: 68
End: 2018-10-11

## 2018-10-11 PROCEDURE — 3331090002 HH PPS REVENUE DEBIT

## 2018-10-11 PROCEDURE — 3331090001 HH PPS REVENUE CREDIT

## 2018-10-11 NOTE — PROGRESS NOTES
Transition of Care Discharge Follow-up Questionnaire Date/Time of Call: 
 October 11, 2018 11:41AM  
What was the patient hospitalized for? Abscess Does the patient understand his/her diagnosis and/or treatment and what happened during the hospitalization? Care Coordinator spoke with patient Mrs. Johnathon Harkins who agreed to Transitions of [employer]. Patient states understanding of diagnosis and treatment plan during hospitalization. Did the patient receive discharge instructions? Yes  
CM Assessed Risk for Readmission:  
 
 
 
 
Patient stated Risk for Readmission: Low to Moderate risk for readmit related to complications from Abscess and other comorbidities. Patient states if any problems occur with wound or wound vac machine. Review any discharge instructions (see discharge instructions/AVS in [facility]). Ask patient if they understand these. Do they have any questions? Care Coordinator reviewed discharge medications, diet and discharge instructions with patient verbalized understanding. Were home services ordered (nursing, PT, OT, ST, etc.)? Yes, home health services ordered for RN wound vac care If so, has the first visit occurred? If not, why? (Assist with coordination of services if necessary.) Patient states home health nurse will be out tomorrow on 10/12.2018. Was any DME ordered? Wound Vac ordered. If so, has it been received? If not, why?  (Assist patient in obtaining DME orders &/or equipment if necessary.) Yes Complete a review of all medications (new, continued and discontinued meds per the D/C instructions and medication tab in [facility]). Care Coordinator reviewed all medications with patient per [facility] who verbalized understanding. Start: ciprofloxacin HCl (CIPRO) 500 mg tablet 
clindamycin (CLEOCIN) 300 mg capsule Stop:  
 isosorbide mononitrate ER (IMDUR) 30 mg tablet calcium citrate 200 mg (950 mg) tablet 
 
 umeclidinium (INCRUSE ELLIPTA) 62.5 mcg/actuation inhaler Were all new prescriptions filled? If not, why?  (Assist patient in obtaining medications if necessary  escalate for CCM &/or SW if ongoing issues are verbalized by pt or anticipated) Yes Does the patient understand the purpose and dosing instructions for all medications? (If patient has questions, provide explanation and education.) Patient states understanding of  medications and dosing instructions. Does the patient have any problems in performing ADLs? (If patient is unable to perform ADLs  what is the limiting factor(s)? Do they have a support system that can assist? If no support system is present, discuss possible assistance that they may be able to obtain. Escalate for CCM/SW if ongoing issues are verbalized by pt or anticipated) Patient states she is independent with ADL's and ambulation. Patient states she is doing pretty good and recovering one day at a time. Patient states her daughters and granddaughter are very supportive and assist her when needed. Does the patient have all follow-up appointments scheduled? 7 day f/up with PCP?  
(f/up with PCP may be w/in 14 days if patient has a f/up with their specialist w/in 7 days) 7-14 day f/up with specialist?  
(or per discharge instructions) If f/up has not been made  what actions has the care coordinator made to accomplish this? Has transportation been arranged? Yes 
 
 
10/15/2018 1:45 PM Monika Murillo  [address] Internal Medicine 10/18/2018 2:30 PM Nadira Sharma, [address] [place] [address] Yes Any other questions or concerns expressed by the patient? No further needs identified: Patient instructed to call Care Coordinator if further questions or concerns arise Schedule next appointment with VENECIA Soto or refer to RN Case Manager/ per the workflow guidelines. When is care coordinators next follow-up call scheduled? If referred for CCM  what RN care manager was the referral assigned? NA 
 
 
 
 
 
2 weeks- Care Coordinator provided contact information if assistance is needed for concerns or questions. NA  
YASMINE Call Completed By: ANUM Luna [facility] ACO Community Care Coordinator This note will not be viewable in [address]

## 2018-10-12 ENCOUNTER — HOME CARE VISIT (OUTPATIENT)
Dept: SCHEDULING | Facility: HOME HEALTH | Age: 68
End: 2018-10-12
Payer: MEDICARE

## 2018-10-12 PROCEDURE — 3331090002 HH PPS REVENUE DEBIT

## 2018-10-12 PROCEDURE — G0299 HHS/HOSPICE OF RN EA 15 MIN: HCPCS

## 2018-10-12 PROCEDURE — 3331090001 HH PPS REVENUE CREDIT

## 2018-10-13 PROCEDURE — 3331090001 HH PPS REVENUE CREDIT

## 2018-10-13 PROCEDURE — 3331090002 HH PPS REVENUE DEBIT

## 2018-10-14 PROCEDURE — 3331090002 HH PPS REVENUE DEBIT

## 2018-10-14 PROCEDURE — 3331090001 HH PPS REVENUE CREDIT

## 2018-10-15 ENCOUNTER — HOME CARE VISIT (OUTPATIENT)
Dept: SCHEDULING | Facility: HOME HEALTH | Age: 68
End: 2018-10-15
Payer: MEDICARE

## 2018-10-15 PROCEDURE — 3331090002 HH PPS REVENUE DEBIT

## 2018-10-15 PROCEDURE — G0299 HHS/HOSPICE OF RN EA 15 MIN: HCPCS

## 2018-10-15 PROCEDURE — 3331090001 HH PPS REVENUE CREDIT

## 2018-10-16 ENCOUNTER — HOME CARE VISIT (OUTPATIENT)
Dept: SCHEDULING | Facility: HOME HEALTH | Age: 68
End: 2018-10-16
Payer: MEDICARE

## 2018-10-16 VITALS
TEMPERATURE: 98.2 F | DIASTOLIC BLOOD PRESSURE: 62 MMHG | OXYGEN SATURATION: 97 % | RESPIRATION RATE: 18 BRPM | HEART RATE: 78 BPM | SYSTOLIC BLOOD PRESSURE: 128 MMHG

## 2018-10-16 VITALS
RESPIRATION RATE: 18 BRPM | TEMPERATURE: 98.6 F | HEART RATE: 72 BPM | DIASTOLIC BLOOD PRESSURE: 78 MMHG | SYSTOLIC BLOOD PRESSURE: 124 MMHG | OXYGEN SATURATION: 94 %

## 2018-10-16 VITALS
TEMPERATURE: 98.2 F | SYSTOLIC BLOOD PRESSURE: 118 MMHG | HEART RATE: 70 BPM | DIASTOLIC BLOOD PRESSURE: 75 MMHG | RESPIRATION RATE: 16 BRPM

## 2018-10-16 PROCEDURE — 3331090002 HH PPS REVENUE DEBIT

## 2018-10-16 PROCEDURE — G0151 HHCP-SERV OF PT,EA 15 MIN: HCPCS

## 2018-10-16 PROCEDURE — G0152 HHCP-SERV OF OT,EA 15 MIN: HCPCS

## 2018-10-16 PROCEDURE — 3331090001 HH PPS REVENUE CREDIT

## 2018-10-17 ENCOUNTER — HOME CARE VISIT (OUTPATIENT)
Dept: HOME HEALTH SERVICES | Facility: HOME HEALTH | Age: 68
End: 2018-10-17
Payer: MEDICARE

## 2018-10-17 ENCOUNTER — HOME CARE VISIT (OUTPATIENT)
Dept: SCHEDULING | Facility: HOME HEALTH | Age: 68
End: 2018-10-17
Payer: MEDICARE

## 2018-10-17 PROBLEM — M00.9 INFECTION OF LEFT KNEE (HCC): Status: ACTIVE | Noted: 2018-10-17

## 2018-10-17 PROCEDURE — 3331090001 HH PPS REVENUE CREDIT

## 2018-10-17 PROCEDURE — G0299 HHS/HOSPICE OF RN EA 15 MIN: HCPCS

## 2018-10-17 PROCEDURE — 3331090002 HH PPS REVENUE DEBIT

## 2018-10-18 ENCOUNTER — HOME CARE VISIT (OUTPATIENT)
Dept: SCHEDULING | Facility: HOME HEALTH | Age: 68
End: 2018-10-18
Payer: MEDICARE

## 2018-10-18 ENCOUNTER — HOSPITAL ENCOUNTER (OUTPATIENT)
Dept: WOUND CARE | Age: 68
Discharge: HOME | End: 2018-10-18
Attending: SURGERY
Payer: MEDICARE

## 2018-10-18 VITALS
SYSTOLIC BLOOD PRESSURE: 128 MMHG | TEMPERATURE: 98.7 F | HEART RATE: 75 BPM | HEIGHT: 62 IN | RESPIRATION RATE: 18 BRPM | BODY MASS INDEX: 39.75 KG/M2 | OXYGEN SATURATION: 95 % | WEIGHT: 216 LBS | DIASTOLIC BLOOD PRESSURE: 72 MMHG

## 2018-10-18 VITALS
HEART RATE: 70 BPM | SYSTOLIC BLOOD PRESSURE: 140 MMHG | RESPIRATION RATE: 17 BRPM | OXYGEN SATURATION: 95 % | DIASTOLIC BLOOD PRESSURE: 74 MMHG | TEMPERATURE: 97.7 F

## 2018-10-18 VITALS
OXYGEN SATURATION: 97 % | DIASTOLIC BLOOD PRESSURE: 68 MMHG | SYSTOLIC BLOOD PRESSURE: 122 MMHG | TEMPERATURE: 97.9 F | HEART RATE: 77 BPM | RESPIRATION RATE: 18 BRPM

## 2018-10-18 DIAGNOSIS — S81.802D OPEN WOUND OF LEFT LOWER EXTREMITY WITH COMPLICATION, SUBSEQUENT ENCOUNTER: ICD-10-CM

## 2018-10-18 DIAGNOSIS — E11.21 TYPE 2 DIABETES MELLITUS WITH NEPHROPATHY (HCC): Primary | Chronic | ICD-10-CM

## 2018-10-18 DIAGNOSIS — Z79.01 LONG TERM (CURRENT) USE OF ANTICOAGULANTS: Chronic | ICD-10-CM

## 2018-10-18 PROCEDURE — G0158 HHC OT ASSISTANT EA 15: HCPCS

## 2018-10-18 PROCEDURE — 3331090002 HH PPS REVENUE DEBIT

## 2018-10-18 PROCEDURE — 3331090001 HH PPS REVENUE CREDIT

## 2018-10-18 PROCEDURE — 99214 OFFICE O/P EST MOD 30 MIN: CPT

## 2018-10-18 PROCEDURE — 99214 OFFICE O/P EST MOD 30 MIN: CPT | Performed by: SURGERY

## 2018-10-18 PROCEDURE — 97605 NEG PRS WND THER DME<=50SQCM: CPT

## 2018-10-18 NOTE — PROGRESS NOTES
WOUND CENTER Consult Note/Progress Note:  
Brown Moreno  MRN: [id number]  LRU:5/2/7179  Age:68 y.o. 
 
HPI: Brown Moreno is a 77 y.o. female who is a 77 y.o. female \"with extensive medical history as noted below on chronic coumadin who initially presented to ER 9/7 after fall in bathroom 8/28 where she landed directly on her left knee with complaints of dizziness and fatigue and found to have Hgb of 6.7.  She was transfused to Hgb of 9.6.  She also was found with a large hematoma/bullae over her left knee and INR of 4.3.  She underwent an I+D per Dr Devorah Bland on 9/11 with a large amount of serosanguinous fluid evacuated. Vita Freeman has been followed by home health PT, OT and wound care since discharge from rehab on ninth floor 9/11-9/21. Vita Freeman has done well except the wound has not closed.  Two days ago, [facility] RN and daughter who is an RN noted mild erythema and heat surrounding upper half of wound.  Open area approx 5-6 cm in circumference.  No purulent drainage, oozing small amounts of dark blood with slightly foul odor.  Full ROM to knee.  All distal circulation, motion and sensation WNL for patient.  No distal edema, mild local edema.  Multiple old scars from prior lower extremity wounds.  Patient denies pain to area, fever, systemic symptoms.  She does not appear septic and lactic acid is 1.8.  WBC is 11.6 without shift.  Wound cultured in ER prior to administration of antibiotics.  Begun on vancomycin and ceftriaxone until cultures resulted. Dayan Magallon, attentive family at bedside. Creatinine 1.48 on admission with baseline of 1.2.  Of note, patient reports diarrhea x 4 days without use of antibiotics, additional GI symptoms. \"   
  
  
10/7/18: Pt sitting up on side of the bed just finishing breakfast. No complaints. AF, NAD. WBC 8.8, Pt taking Coumadin 2.5mg qhs. INR 3.0. 
10/8/18: POD1 s/p left knee debridement; awake in bed, no complaints. AF, VSS. Cx growing P. Mirabilis. 10/9/18: POD2 s/p left knee debridement; awake in chair, no complaints. AF, VSS. Cx growing P. Mirabilis. Vac placed today and functioning without leak 10/10/18: POD3 s/p left knee debridement; awake in chair, no complaints. AF, VSS. Cx growing P. Mirabilis. Vac functioning without leak or bleeding This information was obtained from the patient The following HPI elements were documented for the patient's wound: 
Location: L below knee anterior Duration: 8/28/2018 Severity:  No pain Context:  Fall in bathroom on coumadin, secondary infection Modifying Factors:  Nothing makes better or worse. Improved with debridement on 10/7/2018 and VAC Quality:  n/a Timing:  n/a Associated Signs and Symptoms: 
 
 
 
Past Medical History:  
Diagnosis Date  Anticoagulated on Coumadin 7/9/2013 S/P AVR  CAD (coronary artery disease) 1/20/2013 5/8/14 PCI LAD with stent placed  Cardiomyopathy ([facility]  CHF (congestive heart failure) ([facility] 2/17/2017  CKD (chronic kidney disease) stage 3, GFR 30-59 ml/min ([facility]) 7/10/2013  COPD (chronic obstructive pulmonary disease) ([address] 4/2/2014  Essential hypertension, benign 1/20/2013  HLD (hyperlipidemia)  ICD (implantable cardioverter-defibrillator) in place 10/2/2014 Biotronik single-chamber ICD implantation 10/20/14  Iron deficiency anemia due to chronic blood loss 7/29/2009  MDS (myelodysplastic syndrome) ([facility] 12/17/2011 Procrit started in August, 2011 12/18/11 Procrit weekly and Iron stores. 5-12 12-13-12 good response to 3 weekly procrit did not need it last time  3-7-13 Pt doing well. Just wanted a \"check-up. \" Responding to Procrit every three weeks. 8-29-13 patient has missed some injections on recently restarted hemoglobin only issue , takes oral iron iron stores each time  REJI (obstructive sleep apnea)-cpap 4/2/2014  Osteoarthritis  Osteopenia  Quadrantanopia  Visual complaint 12/14/2015 Past Surgical History:  
Procedure Laterality Date [address]    HX AORTIC VALVE REPLACEMENT  ,   
 mechanical valve   HX  SECTION    
 HX CORONARY STENT PLACEMENT  May, 2014 STEMI with Stent placement.  HX ENDOSCOPY  2009 EGD [address] CATHETERIZATION    HX PACEMAKER  10/2/2014 Biotronik ICD  HX TUBAL LIGATION    
 IR BX BONE MARROW DIAGNOSTIC  2011 Current Outpatient Medications Medication Sig  
 simvastatin 20 mg/5 mL (4 mg/mL) susp Take  by mouth.  isosorbide mononitrate ER (IMDUR) 30 mg tablet Take 30 mg by mouth.  aspirin delayed-release 81 mg tablet Take 81 mg by mouth.  cholecalciferol (VITAMIN D3) 1,000 unit cap Take  by mouth.  spironolactone (ALDACTONE) 25 mg tablet Take 25 mg by mouth.  ciprofloxacin HCl (CIPRO) 500 mg tablet Take 1 Tab by mouth two (2) times a day.  clindamycin (CLEOCIN) 300 mg capsule Take 1 Cap by mouth three (3) times daily for 14 days.  warfarin (COUMADIN) 2.5 mg tablet Take 2.5 mg by mouth nightly.  docusate sodium (COLACE) 50 mg capsule Take 50 mg by mouth daily.  carvedilol (COREG) 6.25 mg tablet Take 1 Tab by mouth two (2) times daily (with meals). (Patient taking differently: Take 3.125 mg by mouth two (2) times daily (with meals). )  furosemide (LASIX) 40 mg tablet Take 1 Tab by mouth daily. (Patient taking differently: Take 20 mg by mouth two (2) times a day.)  traZODone (DESYREL) 50 mg tablet Take 0.5-1 Tabs by mouth nightly.  traMADol (ULTRAM) 50 mg tablet TAKE 1 TABLET BY MOUTH EVERY 4 HOURS AS NEEDED (Patient taking differently: Take 50 mg by mouth every six (6) hours as needed for Pain.)  escitalopram oxalate (LEXAPRO) 20 mg tablet TAKE 1 TAB BY MOUTH DAILY. INDICATIONS: ANXIETY WITH DEPRESSION  
 sacubitril-valsartan (ENTRESTO) 24 mg/26 mg tablet Take 1 Tab by mouth two (2) times a day.  INDICATIONS: CHRONIC HEART FAILURE (Patient taking differently: Take 1 Tab by mouth daily. INDICATIONS: CHRONIC HEART FAILURE)  multivit-minerals/folic acid (ADULT ONE DAILY MULTIVITAMIN PO) Take 1 Tab by mouth daily.  ascorbic acid, vitamin C, (VITAMIN C) 500 mg tablet Take 500 mg by mouth daily.  ferrous gluconate 324 mg (38 mg iron) tablet TAKE 1 TAB BY MOUTH DAILY (BEFORE BREAKFAST). INDICATIONS: IRON DEFICIENCY ANEMIA (Patient taking differently: 325 mg two (2) times daily (with meals). TAKE 1 TAB BY MOUTH DAILY (BEFORE BREAKFAST). INDICATIONS: IRON DEFICIENCY ANEMIA Takes twice a day)  fluticasone (FLONASE) 50 mcg/actuation nasal spray 2 Sprays by Both Nostrils route daily as needed.  loratadine (CLARITIN) 10 mg tablet TAKE 1 TAB BY MOUTH DAILY. (Patient taking differently: TAKE 1 TAB BY MOUTH DAILY PRN)  mometasone-formoterol (DULERA) 200-5 mcg/actuation HFA inhaler 2 puffs bid, rinse mouth after use. (Patient taking differently: Take 2 Puffs by inhalation two (2) times daily as needed.)  albuterol (PROVENTIL VENTOLIN) 2.5 mg /3 mL (0.083 %) nebulizer solution 3 mL by Nebulization route every four (4) hours as needed for Wheezing.  albuterol (VENTOLIN HFA) 90 mcg/actuation inhaler 2 puffs qid prn (Patient taking differently: Take 2 Puffs by inhalation every six (6) hours as needed for Wheezing or Shortness of Breath.)  cholecalciferol, VITAMIN D3, (VITAMIN D3) 5,000 unit tab tablet Take 1 Tab by mouth daily. (Patient taking differently: Take 2,000 Units by mouth daily.)  acetaminophen (TYLENOL) 325 mg tablet Take 650 mg by mouth every four (4) hours as needed for Pain.  OXYGEN-AIR DELIVERY SYSTEMS 3 L by Nasal route continuous.  nitroglycerin (NITROSTAT) 0.4 mg SL tablet 0.4 mg by SubLINGual route every five (5) minutes as needed for Chest Pain. No current facility-administered medications for this encounter. ALLERGIES: Sulfa (sulfonamide antibiotics) and Aspirin Social History Socioeconomic History  Marital status:  Spouse name: Not on file  Number of children: Not on file  Years of education: Not on file  Highest education level: Not on file Social Needs  Financial resource strain: Not on file  Food insecurity - worry: Not on file  Food insecurity - inability: Not on file  Transportation needs - medical: Not on file  Transportation needs - non-medical: Not on file Occupational History Employer: RETIRED Comment: [employer] Tobacco Use  Smoking status: Former Smoker Packs/day: 0.25 Years: 42.00 Pack years: 10.50 Types: Cigarettes Start date: 10/1/1956 Last attempt to quit: 2014 Years since quittin.4  Smokeless tobacco: Never Used Substance and Sexual Activity  Alcohol use: No  
  Alcohol/week: 0.0 oz  Drug use: No  
 Sexual activity: Not on file Other Topics Concern [address] Service Not Asked  Blood Transfusions Not Asked  Caffeine Concern Not Asked  Occupational Exposure Not Asked [place] Hazards Not Asked  Sleep Concern Not Asked  Stress Concern Not Asked  Weight Concern Yes  Special Diet Yes  Back Care Not Asked  Exercise Not Asked  Bike Helmet Not Asked  Seat Belt Not Asked  Self-Exams Not Asked Social History Narrative Lives with her daughter. Ambulates only short distances. Social History Tobacco Use Smoking Status Former Smoker  Packs/day: 0.25  
 Years: 42.00  
 Pack years: 10.50  Types: Cigarettes  Start date: 10/1/1956  Last attempt to quit: 2014  Years since quittin.4 Smokeless Tobacco Never Used Family History Problem Relation Age of Onset  Heart Disease Mother CHF  Hypertension Mother  Kidney Disease Mother  Heart Disease Father CHF  Lung Disease Father  Diabetes Father  Cancer Father   
     prostate  Hypertension Father  Heart Attack Father  Heart Disease Maternal Aunt  Diabetes Brother  Coronary Artery Disease Other  Breast Cancer Neg Hx   
 
 
ROS: The patient has no difficulty with chest pain or shortness of breath. No fever or chills. Comprehensive review of systems was otherwise unremarkable except as noted above. Physical Exam:  
Visit Vitals /72 (BP 1 Location: Right arm) Pulse 75 Temp 98.7 °F (37.1 °C) Resp 18 Ht 5' 2\" (1.575 m) Wt 216 lb (98 kg) SpO2 95% BMI 39.51 kg/m² Constitutional: Alert, oriented, cooperative patient in no acute distress; appears stated age;  General appearance is within normal limits for wound care patient population. See the today's recorded vitals signs and constitutional data. Eyes: Pupils equal; Sclera are clear. EOMs intact; The eyes appear to track and move normally. The sclera are not injected. The conjunctive are clear. The eyelids are normal. There is no scleral icterus. ENMT: There are no obvious external ear, nose, lip or mouth lesions. Nares normal; Neck: Overall contour of the neck is normal with no obvious neck masses. Gross hearing is within normal limits. No obvious neck masses CV: RRR;  no JVD; No evidence of cyanosis of the upper extremities. The extremities are perfused without embolic sign, splinter hemorrhages, or petechia. Resp:  Breathing is non-labored; normal rate and effort; no audible wheezing. GI: soft and non-distended without acute abnormality noted. Musculoskeletal: unremarkable with normal function. No embolic signs or cyanosis. Neuro:  Oriented; moves all 4; no focal deficits Psychiatric: Judgement and insight are within normal limits for the wound care population of patients. Patient is oriented to person, place, and time. Recent and remote memory are within normal limits. Mood and affect are within normal limits. Integumentary (Skin/Wounds) See inspection of wound(s) below.  There are no other skin areas of palpable concern. See wound center documentation including photos in the [facility] [address] [place] [address] Wound Template made part of this record by reference. Wounds: 
Wound (Active) Number of days: 12 Wound Knee Anterior; Left (Active) DRESSING STATUS Clean, dry, and intact 10/18/2018  3:05 PM  
DRESSING TYPE Negative pressure wound therapy 10/18/2018  3:05 PM  
Non-Pressure Injury Full thickness (subcut/muscle) 10/18/2018  3:05 PM  
Wound Length (cm) 5.8 cm 10/18/2018  3:05 PM  
Wound Width (cm) 9.7 cm 10/18/2018  3:05 PM  
Wound Depth (cm) 0.1 10/18/2018  3:05 PM  
Wound Surface area (cm^2) 56.26 cm^2 10/18/2018  3:05 PM  
Condition of Base Granulation 10/18/2018  3:05 PM  
Condition of Edges Open 10/18/2018  3:05 PM  
Tissue Type Red 10/18/2018  3:05 PM  
Tissue Type Percent Red 100 10/18/2018  3:05 PM  
Drainage Amount  Moderate 10/18/2018  3:05 PM  
Drainage Color Serosanguinous 10/18/2018  3:05 PM  
Wound Odor None 10/18/2018  3:05 PM  
Periwound Skin Condition Edematous 10/18/2018  3:05 PM  
Cleansing and Cleansing Agents  Normal saline 10/18/2018  3:05 PM  
Number of days: 0 [REMOVED] Wound Knee Left (Removed) Number of days: 29 Recent vitals (if inpt): 
Patient Vitals for the past 24 hrs: 
 BP Temp Pulse Resp SpO2 Height Weight 10/18/18 1450 128/72 98.7 °F (37.1 °C) 75 18 95 % 5' 2\" (1.575 m) 216 lb (98 kg) Labs: 
No results for input(s): WBC, HGB, PLT, NA, K, CL, CO2, BUN, CREA, GLU, PTP, INR, APTT, TBIL, TBILI, CBIL, SGOT, GPT, ALT, AP, AML, LPSE, LCAD, NH4, TROPT, TROIQ, PCO2, PO2, HCO3, HGBEXT, PLTEXT in the last 72 hours. No lab exists for component:  PH, INREXT Lab Results Component Value Date/Time  WBC 7.1 10/09/2018 04:14 AM  
 HGB 9.1 (L) 10/09/2018 04:14 AM  
 PLATELET 927 17/50/4815 04:14 AM  
 Sodium 141 10/10/2018 04:02 AM  
 Potassium 4.8 10/10/2018 04:02 AM  
 Chloride 103 10/10/2018 04:02 AM  
 CO2 32 10/10/2018 04:02 AM  
 BUN 38 (H) 10/10/2018 04:02 AM  
 Creatinine 1.67 (H) 10/10/2018 04:02 AM  
 Glucose 112 (H) 10/10/2018 04:02 AM  
 INR 2.3 10/10/2018 04:02 AM  
 INR, External 1.3 10/15/2018  
 aPTT 60.2 (H) 10/10/2018 04:02 AM  
 Bilirubin, total 0.4 10/08/2018 04:26 AM  
 AST (SGOT) 11 (L) 10/08/2018 04:26 AM  
 ALT (SGPT) 11 (L) 10/08/2018 04:26 AM  
 Alk. phosphatase 47 (L) 10/08/2018 04:26 AM  
 Amylase 88 01/31/2012 03:12 PM  
 Lipase 157 10/08/2018 04:26 AM  
 Lactic acid 0.7 02/16/2016 06:16 AM  
 Troponin-I <0.05 01/31/2012 03:32 PM  
 Troponin-I, Qt. 0.08 (H) 12/01/2016 02:20 AM  
 
 
I reviewed recent labs and recent radiologic studies. I independently reviewed radiology images for studies I described above or studies I have ordered. Admission date (for inpatients): 10/18/2018 * No surgery found *  * No surgery found * ASSESSMENT/PLAN: 
Significant improvement since discharge from the hospital status post debridement on 10/7/2018 and VAC placement. She continues with 3 times per week back changes. The wound is large and full-thickness. We will start with skin substitute and then proceed likely with autograft. We will continue with the back. The base is granulating nicely. Problem List  Date Reviewed: 10/17/2018 Codes Class Noted Infection of left knee ([facility] ICD-10-CM: M00.9 ICD-9-CM: 686.9  10/17/2018 Open wound of left lower extremity with complication [id number]: F35.810E ICD-9-CM: 891.1  10/7/2018 Abscess ICD-10-CM: L02.91 
ICD-9-CM: 682.9  10/6/2018 History of incision and drainage ICD-10-CM: Z98.890 ICD-9-CM: V45.89  10/6/2018 Traumatic hematoma of left knee (Chronic) ICD-10-CM: A14.61ZQ ICD-9-CM: 924.11  9/8/2018 Debility ICD-10-CM: R53.81 ICD-9-CM: 799.3  9/8/2018 Anemia (Chronic) ICD-10-CM: D64.9 ICD-9-CM: 285.9  9/7/2018  Chronic respiratory failure with hypoxia (HCC) (Chronic) ICD-10-CM: J96.11 
 ICD-9-CM: 518.83, 799.02  9/7/2018 Encounter for immunization ICD-10-CM: W51 ICD-9-CM: V03.89  8/21/2018 Obesity, morbid ([facility] (Chronic) ICD-10-CM: E66.01 
ICD-9-CM: 278.01  6/8/2018 Physical debility (Chronic) ICD-10-CM: R53.81 ICD-9-CM: 799.3  5/2/2018 Closed nondisplaced fracture of shaft of fifth metacarpal bone of left hand ICD-10-CM: W84.136F ICD-9-CM: 815.03  4/28/2018 Long term (current) use of anticoagulants (Chronic) ICD-10-CM: Z79.01 
ICD-9-CM: V58.61  4/19/2018 Dysthymia ICD-10-CM: F34.1 ICD-9-CM: 300.4  4/5/2018 Type 2 diabetes mellitus with nephropathy (HCC) (Chronic) ICD-10-CM: E11.21 
ICD-9-CM: 250.40, 583.81  12/18/2017 Pulmonary hypertension (HCC) (Chronic) ICD-10-CM: I27.20 ICD-9-CM: 416.8  6/15/2016 S/P AVR (aortic valve replacement) (Chronic) ICD-10-CM: Z95.2 ICD-9-CM: V43.3  Unknown Overview Signed 2/23/2016 11:04 AM by Сергей Billingsley Mechanical/Artific Cardiomyopathy ([facility] (Chronic) ICD-10-CM: I42.9 ICD-9-CM: 425.4  Unknown Osteopenia ICD-10-CM: M85.80 ICD-9-CM: 733.90  Unknown HLD (hyperlipidemia) (Chronic) ICD-10-CM: T27.8 ICD-9-CM: 272.4  Unknown Osteoarthritis ICD-10-CM: M19.90 ICD-9-CM: 715.90  Unknown  
   
 ICD (implantable cardioverter-defibrillator) in place ICD-10-CM: Z95.810 ICD-9-CM: V45.02  10/2/2014 Overview Signed 10/2/2014  4:58 PM by [employer] single-chamber ICD implantation 10/20/14 Diabetes mellitus type 2, diet-controlled (HCC) (Chronic) ICD-10-CM: E11.9 ICD-9-CM: 250.00  8/28/2014 Overview Signed 10/6/2018  8:56 PM by Luna Steward NP  
  A1C: 5.7 COPD (chronic obstructive pulmonary disease) (HCC) (Chronic) ICD-10-CM: J44.9 ICD-9-CM: 058  4/2/2014  Overview Signed 10/6/2018  8:56 PM by Luna Steward NP  
  HOME OXYGEN 3 LITERS 
  
  
   
 REJI (obstructive sleep apnea)-cpap (Chronic) ICD-10-CM: G47.33 
ICD-9-CM: 327.23  4/2/2014 CKD (chronic kidney disease) stage 3, GFR 30-59 ml/min (HCC) (Chronic) ICD-10-CM: N18.3 ICD-9-CM: 585.3  7/10/2013 Anticoagulated on Coumadin (Chronic) ICD-10-CM: Z51.81, Z79.01 
ICD-9-CM: V58.83, V58.61  7/9/2013 Overview Signed 7/9/2013  4:16 PM by Ousmane Whitley NP  
  S/P AVR 
  
  
   
 CAD (coronary artery disease) (Chronic) ICD-10-CM: I25.10 ICD-9-CM: 414.00  1/20/2013 Overview Signed 5/20/2014 10:05 AM by Janae Walsh NP  
  5/8/14 PCI LAD with stent placed Chronic combined systolic and diastolic heart failure (HCC) (Chronic) ICD-10-CM: I50.42 
ICD-9-CM: 428.42  1/20/2013 Overview Addendum 4/28/2018  4:53 AM by Tiana Gonzales MD  
  5/8/14 ECHO:  EF 10-15% 12/2017:  EF 25-30% Essential hypertension, benign (Chronic) ICD-10-CM: I10 
ICD-9-CM: 401.1  1/20/2013 MDS (myelodysplastic syndrome) (HCC) (Chronic) ICD-10-CM: D46.9 ICD-9-CM: 238.75  12/17/2011 Overview Addendum 8/29/2013 11:06 AM by Blas Teague Procrit started in August, 2011 12/18/11 Procrit weekly and Iron stores. 5-12 12-13-12 good response to 3 weekly procrit did not need it last time 3-7-13 Pt doing well. Just wanted a \"check-up. \" Responding to Procrit every three weeks. 8-29-13 patient has missed some injections on recently restarted hemoglobin only issue , takes oral iron iron stores each time Iron deficiency anemia due to chronic blood loss (Chronic) ICD-10-CM: D50.0 ICD-9-CM: 280.0  7/29/2009 Active Problems: * No active hospital problems. * Any procedures done today in the wound center are documented in a separate note in connect care made part of this record by reference Wound Care Orders Placed This Encounter  WOUND CARE, DRESSING CHANGE Clean wound with saline. Apply adaptic to wound bed. Cover with vac. Negative Pressure Therapy(KCI Wound Vac, Medela or SNAP) Black granufoam, 125mmHg, continuous. Change 3 times per week. If pump malfunctions and requires change on non-scheduled day, patient to return and have pump reapplied by clinician Change 3 times/week. Home health to continue for vac changes. Standing Status:   Standing Number of Occurrences:   1 Follow-up Information Follow up With Specialties Details Why Contact Info [facility] OP WOUND CARE Wound Care In 2 weeks  [facility] [address] [facility] [address] [facility] [address] [id number] [phone] Signed:  Piyush Solis MD,  FACS

## 2018-10-18 NOTE — WOUND CARE
[address] [place] [address] [place], [address] Phone: [phone] Fax: [phone] Patient: Oscar Hussein MRN: [id number]  SSN: [id number] YOB: 1950  Age: 77 y.o. Sex: female Return Appointment: 2 weeks with Thang Anderson MD 
 
Instructions: Clean wound with saline. Apply adaptic to wound bed. Cover with vac. Negative Pressure Therapy(KCI Wound Vac, Medela or SNAP) Black granufoam, 125mmHg, continuous. Change 3 times per week. If pump malfunctions and requires change on non-scheduled day, patient to return and have pump reapplied by clinician Change 3 times/week. Home health to continue for vac changes. Should you experience increased redness, swelling, pain, foul odor, size of wound(s), or have a temperature over 101 degrees please contact the [address] [place] [address] Road at [phone] or if after hours contact your primary care physician or go to the hospital emergency department. Signed By: Caryn Hudson RN October 18, 2018

## 2018-10-19 ENCOUNTER — HOME CARE VISIT (OUTPATIENT)
Dept: SCHEDULING | Facility: HOME HEALTH | Age: 68
End: 2018-10-19
Payer: MEDICARE

## 2018-10-19 VITALS
OXYGEN SATURATION: 97 % | DIASTOLIC BLOOD PRESSURE: 62 MMHG | TEMPERATURE: 97.9 F | SYSTOLIC BLOOD PRESSURE: 128 MMHG | HEART RATE: 77 BPM | RESPIRATION RATE: 17 BRPM

## 2018-10-19 PROCEDURE — 3331090002 HH PPS REVENUE DEBIT

## 2018-10-19 PROCEDURE — 3331090001 HH PPS REVENUE CREDIT

## 2018-10-19 PROCEDURE — G0299 HHS/HOSPICE OF RN EA 15 MIN: HCPCS

## 2018-10-20 PROCEDURE — 3331090001 HH PPS REVENUE CREDIT

## 2018-10-20 PROCEDURE — 3331090002 HH PPS REVENUE DEBIT

## 2018-10-21 PROCEDURE — 3331090002 HH PPS REVENUE DEBIT

## 2018-10-21 PROCEDURE — 3331090001 HH PPS REVENUE CREDIT

## 2018-10-22 ENCOUNTER — HOME CARE VISIT (OUTPATIENT)
Dept: SCHEDULING | Facility: HOME HEALTH | Age: 68
End: 2018-10-22
Payer: MEDICARE

## 2018-10-22 VITALS
DIASTOLIC BLOOD PRESSURE: 62 MMHG | SYSTOLIC BLOOD PRESSURE: 128 MMHG | TEMPERATURE: 98.1 F | RESPIRATION RATE: 17 BRPM | HEART RATE: 77 BPM | OXYGEN SATURATION: 98 %

## 2018-10-22 VITALS
TEMPERATURE: 97.3 F | RESPIRATION RATE: 18 BRPM | DIASTOLIC BLOOD PRESSURE: 84 MMHG | HEART RATE: 80 BPM | SYSTOLIC BLOOD PRESSURE: 130 MMHG | OXYGEN SATURATION: 96 %

## 2018-10-22 LAB
INR BLD: 1.6 (ref 0.9–1.1)
PT POC: 19.8 SECONDS (ref 11.8–14.9)

## 2018-10-22 PROCEDURE — G0299 HHS/HOSPICE OF RN EA 15 MIN: HCPCS

## 2018-10-22 PROCEDURE — 3331090002 HH PPS REVENUE DEBIT

## 2018-10-22 PROCEDURE — G0158 HHC OT ASSISTANT EA 15: HCPCS

## 2018-10-22 PROCEDURE — 3331090001 HH PPS REVENUE CREDIT

## 2018-10-23 ENCOUNTER — HOSPITAL ENCOUNTER (OUTPATIENT)
Dept: INFUSION THERAPY | Age: 68
Discharge: HOME | End: 2018-10-23
Payer: MEDICARE

## 2018-10-23 ENCOUNTER — HOSPITAL ENCOUNTER (OUTPATIENT)
Dept: LAB | Age: 68
Discharge: HOME | End: 2018-10-23
Payer: MEDICARE

## 2018-10-23 DIAGNOSIS — D46.9 MDS (MYELODYSPLASTIC SYNDROME) (HCC): ICD-10-CM

## 2018-10-23 DIAGNOSIS — D46.9 MDS (MYELODYSPLASTIC SYNDROME) (HCC): Chronic | ICD-10-CM

## 2018-10-23 DIAGNOSIS — N18.30 CKD (CHRONIC KIDNEY DISEASE) STAGE 3, GFR 30-59 ML/MIN (HCC): ICD-10-CM

## 2018-10-23 DIAGNOSIS — D50.0 IRON DEFICIENCY ANEMIA DUE TO CHRONIC BLOOD LOSS: Chronic | ICD-10-CM

## 2018-10-23 DIAGNOSIS — Z79.01 LONG TERM (CURRENT) USE OF ANTICOAGULANTS: Primary | ICD-10-CM

## 2018-10-23 DIAGNOSIS — D50.0 IRON DEFICIENCY ANEMIA DUE TO CHRONIC BLOOD LOSS: ICD-10-CM

## 2018-10-23 DIAGNOSIS — Z95.2 S/P AVR (AORTIC VALVE REPLACEMENT): ICD-10-CM

## 2018-10-23 LAB
ALBUMIN SERPL-MCNC: 3.2 G/DL (ref 3.2–4.6)
ALBUMIN/GLOB SERPL: 0.8 {RATIO} (ref 1.2–3.5)
ALP SERPL-CCNC: 49 U/L (ref 50–136)
ALT SERPL-CCNC: 16 U/L (ref 12–65)
ANION GAP SERPL CALC-SCNC: 3 MMOL/L (ref 7–16)
AST SERPL-CCNC: 15 U/L (ref 15–37)
BASOPHILS # BLD: 0.1 K/UL (ref 0–0.2)
BASOPHILS NFR BLD: 1 % (ref 0–2)
BILIRUB SERPL-MCNC: 0.5 MG/DL (ref 0.2–1.1)
BUN SERPL-MCNC: 33 MG/DL (ref 8–23)
CALCIUM SERPL-MCNC: 9.6 MG/DL (ref 8.3–10.4)
CHLORIDE SERPL-SCNC: 105 MMOL/L (ref 98–107)
CO2 SERPL-SCNC: 34 MMOL/L (ref 21–32)
CREAT SERPL-MCNC: 1.53 MG/DL (ref 0.6–1)
DIFFERENTIAL METHOD BLD: ABNORMAL
EOSINOPHIL # BLD: 0.1 K/UL (ref 0–0.8)
EOSINOPHIL NFR BLD: 2 % (ref 0.5–7.8)
ERYTHROCYTE [DISTWIDTH] IN BLOOD BY AUTOMATED COUNT: 15.3 % (ref 11.9–14.6)
GLOBULIN SER CALC-MCNC: 4.1 G/DL (ref 2.3–3.5)
GLUCOSE SERPL-MCNC: 163 MG/DL (ref 65–100)
HCT VFR BLD AUTO: 27 % (ref 35.8–46.3)
HGB BLD-MCNC: 8.5 G/DL (ref 11.7–15.4)
IMM GRANULOCYTES # BLD: 0 K/UL (ref 0–0.5)
IMM GRANULOCYTES NFR BLD AUTO: 0 % (ref 0–5)
LYMPHOCYTES # BLD: 1.1 K/UL (ref 0.5–4.6)
LYMPHOCYTES NFR BLD: 15 % (ref 13–44)
MCH RBC QN AUTO: 28.7 PG (ref 26.1–32.9)
MCHC RBC AUTO-ENTMCNC: 31.5 G/DL (ref 31.4–35)
MCV RBC AUTO: 91.2 FL (ref 79.6–97.8)
MONOCYTES # BLD: 0.5 K/UL (ref 0.1–1.3)
MONOCYTES NFR BLD: 7 % (ref 4–12)
NEUTS SEG # BLD: 5.4 K/UL (ref 1.7–8.2)
NEUTS SEG NFR BLD: 75 % (ref 43–78)
NRBC # BLD: 0 K/UL (ref 0–0.2)
PLATELET # BLD AUTO: 191 K/UL (ref 150–450)
PMV BLD AUTO: 10 FL (ref 9.4–12.3)
POTASSIUM SERPL-SCNC: 4.4 MMOL/L (ref 3.5–5.1)
PROT SERPL-MCNC: 7.3 G/DL (ref 6.3–8.2)
RBC # BLD AUTO: 2.96 M/UL (ref 4.05–5.25)
SODIUM SERPL-SCNC: 142 MMOL/L (ref 136–145)
WBC # BLD AUTO: 7.2 K/UL (ref 4.3–11.1)

## 2018-10-23 PROCEDURE — 3331090002 HH PPS REVENUE DEBIT

## 2018-10-23 PROCEDURE — 36415 COLL VENOUS BLD VENIPUNCTURE: CPT

## 2018-10-23 PROCEDURE — 96372 THER/PROPH/DIAG INJ SC/IM: CPT

## 2018-10-23 PROCEDURE — 74011250636 HC RX REV CODE- 250/636: Performed by: NURSE PRACTITIONER

## 2018-10-23 PROCEDURE — 85025 COMPLETE CBC W/AUTO DIFF WBC: CPT

## 2018-10-23 PROCEDURE — 80053 COMPREHEN METABOLIC PANEL: CPT

## 2018-10-23 PROCEDURE — 3331090001 HH PPS REVENUE CREDIT

## 2018-10-23 RX ADMIN — ERYTHROPOIETIN 60000 UNITS: 40000 INJECTION, SOLUTION INTRAVENOUS; SUBCUTANEOUS at 12:10

## 2018-10-23 NOTE — PROGRESS NOTES
Arrived to the [facility]. Assessment and procrit injection completed. Patient tolerated well. Any issues or concerns during appointment: none. Patient aware of next infusion appointment on 11/13/2018 (date) at [phone] (time) with infusion center. Discharged via wheelchair, with caregiver. Patient advised to call Dr. Reilly Leisure office if she has any problems or concerns. Patient verbalized understanding.

## 2018-10-24 ENCOUNTER — HOME CARE VISIT (OUTPATIENT)
Dept: SCHEDULING | Facility: HOME HEALTH | Age: 68
End: 2018-10-24
Payer: MEDICARE

## 2018-10-24 PROBLEM — J96.11 CHRONIC RESPIRATORY FAILURE WITH HYPOXIA (HCC): Status: ACTIVE | Noted: 2018-09-07

## 2018-10-24 PROBLEM — R53.81 DEBILITY: Chronic | Status: ACTIVE | Noted: 2018-09-08

## 2018-10-24 PROCEDURE — 3331090002 HH PPS REVENUE DEBIT

## 2018-10-24 PROCEDURE — G0299 HHS/HOSPICE OF RN EA 15 MIN: HCPCS

## 2018-10-24 PROCEDURE — 3331090001 HH PPS REVENUE CREDIT

## 2018-10-25 ENCOUNTER — HOME CARE VISIT (OUTPATIENT)
Dept: SCHEDULING | Facility: HOME HEALTH | Age: 68
End: 2018-10-25
Payer: MEDICARE

## 2018-10-25 PROCEDURE — 3331090002 HH PPS REVENUE DEBIT

## 2018-10-25 PROCEDURE — G0158 HHC OT ASSISTANT EA 15: HCPCS

## 2018-10-25 PROCEDURE — 3331090001 HH PPS REVENUE CREDIT

## 2018-10-26 ENCOUNTER — HOME CARE VISIT (OUTPATIENT)
Dept: SCHEDULING | Facility: HOME HEALTH | Age: 68
End: 2018-10-26
Payer: MEDICARE

## 2018-10-26 ENCOUNTER — PATIENT OUTREACH (OUTPATIENT)
Dept: CASE MANAGEMENT | Age: 68
End: 2018-10-26

## 2018-10-26 VITALS
DIASTOLIC BLOOD PRESSURE: 58 MMHG | TEMPERATURE: 98.1 F | RESPIRATION RATE: 17 BRPM | OXYGEN SATURATION: 97 % | SYSTOLIC BLOOD PRESSURE: 132 MMHG | HEART RATE: 78 BPM

## 2018-10-26 VITALS
DIASTOLIC BLOOD PRESSURE: 80 MMHG | TEMPERATURE: 97.3 F | OXYGEN SATURATION: 96 % | HEART RATE: 88 BPM | RESPIRATION RATE: 17 BRPM | SYSTOLIC BLOOD PRESSURE: 138 MMHG

## 2018-10-26 PROCEDURE — 3331090001 HH PPS REVENUE CREDIT

## 2018-10-26 PROCEDURE — G0299 HHS/HOSPICE OF RN EA 15 MIN: HCPCS

## 2018-10-26 PROCEDURE — 3331090002 HH PPS REVENUE DEBIT

## 2018-10-27 PROCEDURE — 3331090001 HH PPS REVENUE CREDIT

## 2018-10-27 PROCEDURE — 3331090002 HH PPS REVENUE DEBIT

## 2018-10-28 PROCEDURE — 3331090002 HH PPS REVENUE DEBIT

## 2018-10-28 PROCEDURE — 3331090001 HH PPS REVENUE CREDIT

## 2018-10-29 ENCOUNTER — HOME CARE VISIT (OUTPATIENT)
Dept: HOME HEALTH SERVICES | Facility: HOME HEALTH | Age: 68
End: 2018-10-29
Payer: MEDICARE

## 2018-10-29 ENCOUNTER — HOME CARE VISIT (OUTPATIENT)
Dept: SCHEDULING | Facility: HOME HEALTH | Age: 68
End: 2018-10-29
Payer: MEDICARE

## 2018-10-29 VITALS
HEART RATE: 96 BPM | SYSTOLIC BLOOD PRESSURE: 128 MMHG | DIASTOLIC BLOOD PRESSURE: 86 MMHG | RESPIRATION RATE: 18 BRPM | OXYGEN SATURATION: 98 % | TEMPERATURE: 97.8 F

## 2018-10-29 PROCEDURE — G0158 HHC OT ASSISTANT EA 15: HCPCS

## 2018-10-29 PROCEDURE — 3331090002 HH PPS REVENUE DEBIT

## 2018-10-29 PROCEDURE — G0299 HHS/HOSPICE OF RN EA 15 MIN: HCPCS

## 2018-10-29 PROCEDURE — 3331090001 HH PPS REVENUE CREDIT

## 2018-10-30 PROCEDURE — 3331090001 HH PPS REVENUE CREDIT

## 2018-10-30 PROCEDURE — 3331090002 HH PPS REVENUE DEBIT

## 2018-10-31 ENCOUNTER — HOME CARE VISIT (OUTPATIENT)
Dept: SCHEDULING | Facility: HOME HEALTH | Age: 68
End: 2018-10-31
Payer: MEDICARE

## 2018-10-31 VITALS
SYSTOLIC BLOOD PRESSURE: 128 MMHG | OXYGEN SATURATION: 98 % | RESPIRATION RATE: 18 BRPM | TEMPERATURE: 97.8 F | HEART RATE: 88 BPM | DIASTOLIC BLOOD PRESSURE: 72 MMHG

## 2018-10-31 VITALS
DIASTOLIC BLOOD PRESSURE: 72 MMHG | OXYGEN SATURATION: 98 % | TEMPERATURE: 97.8 F | RESPIRATION RATE: 18 BRPM | TEMPERATURE: 97.6 F | OXYGEN SATURATION: 97 % | HEART RATE: 78 BPM | DIASTOLIC BLOOD PRESSURE: 70 MMHG | SYSTOLIC BLOOD PRESSURE: 128 MMHG | HEART RATE: 81 BPM | HEART RATE: 82 BPM | RESPIRATION RATE: 18 BRPM | RESPIRATION RATE: 18 BRPM | SYSTOLIC BLOOD PRESSURE: 133 MMHG | SYSTOLIC BLOOD PRESSURE: 132 MMHG | DIASTOLIC BLOOD PRESSURE: 68 MMHG | OXYGEN SATURATION: 98 % | TEMPERATURE: 97.9 F

## 2018-10-31 PROCEDURE — 3331090001 HH PPS REVENUE CREDIT

## 2018-10-31 PROCEDURE — 3331090002 HH PPS REVENUE DEBIT

## 2018-10-31 PROCEDURE — G0299 HHS/HOSPICE OF RN EA 15 MIN: HCPCS

## 2018-11-01 ENCOUNTER — HOME CARE VISIT (OUTPATIENT)
Dept: SCHEDULING | Facility: HOME HEALTH | Age: 68
End: 2018-11-01
Payer: MEDICARE

## 2018-11-01 ENCOUNTER — HOSPITAL ENCOUNTER (OUTPATIENT)
Dept: WOUND CARE | Age: 68
Discharge: HOME | End: 2018-11-01
Attending: SURGERY
Payer: MEDICARE

## 2018-11-01 VITALS
DIASTOLIC BLOOD PRESSURE: 78 MMHG | SYSTOLIC BLOOD PRESSURE: 140 MMHG | HEART RATE: 70 BPM | RESPIRATION RATE: 18 BRPM | TEMPERATURE: 98.3 F

## 2018-11-01 VITALS
SYSTOLIC BLOOD PRESSURE: 129 MMHG | DIASTOLIC BLOOD PRESSURE: 67 MMHG | HEART RATE: 77 BPM | RESPIRATION RATE: 18 BRPM | OXYGEN SATURATION: 92 % | TEMPERATURE: 98.7 F | WEIGHT: 216.8 LBS | HEIGHT: 62 IN | BODY MASS INDEX: 39.9 KG/M2

## 2018-11-01 DIAGNOSIS — S81.802D OPEN WOUND OF LEFT LOWER EXTREMITY WITH COMPLICATION, SUBSEQUENT ENCOUNTER: ICD-10-CM

## 2018-11-01 DIAGNOSIS — Z79.01 ANTICOAGULATED ON COUMADIN: Chronic | ICD-10-CM

## 2018-11-01 DIAGNOSIS — E11.9 DIABETES MELLITUS TYPE 2, DIET-CONTROLLED (HCC): Chronic | ICD-10-CM

## 2018-11-01 PROCEDURE — 15271 SKIN SUB GRAFT TRNK/ARM/LEG: CPT | Performed by: SURGERY

## 2018-11-01 PROCEDURE — G0152 HHCP-SERV OF OT,EA 15 MIN: HCPCS

## 2018-11-01 PROCEDURE — 15272 SKIN SUB GRAFT T/A/L ADD-ON: CPT

## 2018-11-01 PROCEDURE — 15002 WOUND PREP TRK/ARM/LEG: CPT | Performed by: SURGERY

## 2018-11-01 PROCEDURE — 15272 SKIN SUB GRAFT T/A/L ADD-ON: CPT | Performed by: SURGERY

## 2018-11-01 PROCEDURE — 3331090001 HH PPS REVENUE CREDIT

## 2018-11-01 PROCEDURE — 15271 SKIN SUB GRAFT TRNK/ARM/LEG: CPT

## 2018-11-01 PROCEDURE — 3331090002 HH PPS REVENUE DEBIT

## 2018-11-01 NOTE — WOUND CARE
11/01/18 1343 Wound Knee Anterior; Left Date First Assessed/Time First Assessed: 10/18/18 1505   POA: Yes  Wound Type: Trauma  Location: Knee  Wound Description (Optional): #1  Orientation: Anterior; Left DRESSING STATUS Clean, dry, and intact DRESSING TYPE Negative pressure wound therapy Non-Pressure Injury Full thickness (subcut/muscle) Wound Length (cm) 6 cm Wound Width (cm) 9 cm Wound Depth (cm) 0.1 Wound Surface area (cm^2) 54 cm^2 Change in Wound Size % 4.02 Condition of Base Granulation Condition of Edges Open Tissue Type Red Tissue Type Percent Red 100 Drainage Amount  Moderate Drainage Color Serosanguinous Wound Odor None Periwound Skin Condition Edematous Cleansing and Cleansing Agents  Soap and water

## 2018-11-01 NOTE — PROGRESS NOTES
WOUND CENTER Consult Note/Progress Note:  
Ryanne Leger  MRN: [id number]  SMY:[birth year]  Age:68 y.o. 
 
HPI: Ryanne Leger is a 77 y.o. female who is a 77 y.o. female \"with extensive medical history as noted below on chronic coumadin who initially presented to ER 9/7 after fall in bathroom 8/28 where she landed directly on her left knee with complaints of dizziness and fatigue and found to have Hgb of 6.7.  She was transfused to Hgb of 9.6.  She also was found with a large hematoma/bullae over her left knee and INR of 4.3.  She underwent an I+D per Dr Samira Moran on 9/11 with a large amount of serosanguinous fluid evacuated. Klarissa Mohan has been followed by home health PT, OT and wound care since discharge from rehab on ninth floor 9/11-9/21. Klarissa Mohan has done well except the wound has not closed.  Two days ago, Henrik Garvey RN and daughter who is an RN noted mild erythema and heat surrounding upper half of wound.  Open area approx 5-6 cm in circumference.  No purulent drainage, oozing small amounts of dark blood with slightly foul odor.  Full ROM to knee.  All distal circulation, motion and sensation WNL for patient.  No distal edema, mild local edema.  Multiple old scars from prior lower extremity wounds.  Patient denies pain to area, fever, systemic symptoms.  She does not appear septic and lactic acid is 1.8.  WBC is 11.6 without shift.  Wound cultured in ER prior to administration of antibiotics.  Begun on vancomycin and ceftriaxone until cultures resulted. Vicky Ortez, attentive family at bedside. Creatinine 1.48 on admission with baseline of 1.2.  Of note, patient reports diarrhea x 4 days without use of antibiotics, additional GI symptoms. \"   
  
  
10/7/18: Pt sitting up on side of the bed just finishing breakfast. No complaints. AF, NAD. WBC 8.8, Pt taking Coumadin 2.5mg qhs. INR 3.0. 
10/8/18: POD1 s/p left knee debridement; awake in bed, no complaints. AF, VSS. Cx growing P. Mirabilis. 10/9/18: POD2 s/p left knee debridement; awake in chair, no complaints. AF, VSS. Cx growing P. Mirabilis. Vac placed today and functioning without leak 10/10/18: POD3 s/p left knee debridement; awake in chair, no complaints. AF, VSS. Cx growing P. Mirabilis. Vac functioning without leak or bleeding This information was obtained from the patient The following HPI elements were documented for the patient's wound: 
Location: L below knee anterior Duration: 8/28/2018 Severity:  No pain Context:  Fall in bathroom on coumadin, secondary infection Modifying Factors:  Nothing makes better or worse. Improved with debridement on 10/7/2018 and VAC Quality:  n/a Timing:  n/a Associated Signs and Symptoms: 
 
 
 
Past Medical History:  
Diagnosis Date  Anticoagulated on Coumadin 7/9/2013 S/P AVR  CAD (coronary artery disease) 1/20/2013 5/8/14 PCI LAD with stent placed  Cardiomyopathy ([address]  CHF (congestive heart failure) ([address] 2/17/2017  CKD (chronic kidney disease) stage 3, GFR 30-59 ml/min ([facility]) 7/10/2013  COPD (chronic obstructive pulmonary disease) ([address] 4/2/2014  Essential hypertension, benign 1/20/2013  HLD (hyperlipidemia)  ICD (implantable cardioverter-defibrillator) in place 10/2/2014 Biotronik single-chamber ICD implantation 10/20/14  Iron deficiency anemia due to chronic blood loss 7/29/2009  MDS (myelodysplastic syndrome) ([address] 12/17/2011 Procrit started in August, 2011 12/18/11 Procrit weekly and Iron stores. 5-12 12-13-12 good response to 3 weekly procrit did not need it last time  3-7-13 Pt doing well. Just wanted a \"check-up. \" Responding to Procrit every three weeks. 8-29-13 patient has missed some injections on recently restarted hemoglobin only issue , takes oral iron iron stores each time  REJI (obstructive sleep apnea)-cpap 4/2/2014  Osteoarthritis  Osteopenia  Pulmonary hypertension ([address] 6/15/2016  Quadrantanopia  Visual complaint 2015 Past Surgical History:  
Procedure Laterality Date [address]    HX AORTIC VALVE REPLACEMENT  ,   
 mechanical valve   HX  SECTION    
 HX CORONARY STENT PLACEMENT  May, 2014 STEMI with Stent placement.  HX ENDOSCOPY  2009 EGD [address] CATHETERIZATION    HX PACEMAKER  10/2/2014 Bi[employer]ronik ICD  HX TUBAL LIGATION    
 IR BX BONE MARROW DIAGNOSTIC  2011 Current Outpatient Medications Medication Sig  
 fluticasone-vilanterol (BREO ELLIPTA) 200-25 mcg/dose inhaler Take 1 Puff by inhalation daily.  simvastatin (ZOCOR) 20 mg tablet Take  by mouth nightly.  OTHER Trilogy  mometasone-formoterol (DULERA) 200-5 mcg/actuation HFA inhaler 2 puffs bid, rinse mouth after use.  simvastatin 20 mg/5 mL (4 mg/mL) susp Take  by mouth.  isosorbide mononitrate ER (IMDUR) 30 mg tablet Take 30 mg by mouth.  aspirin delayed-release 81 mg tablet Take 81 mg by mouth.  cholecalciferol (VITAMIN D3) 1,000 unit cap Take  by mouth.  spironolactone (ALDACTONE) 25 mg tablet Take 25 mg by mouth.  warfarin (COUMADIN) 2.5 mg tablet Take 2.5 mg by mouth nightly.  docusate sodium (COLACE) 50 mg capsule Take 50 mg by mouth daily.  carvedilol (COREG) 6.25 mg tablet Take 1 Tab by mouth two (2) times daily (with meals). (Patient taking differently: Take 3.125 mg by mouth two (2) times daily (with meals). )  furosemide (LASIX) 40 mg tablet Take 1 Tab by mouth daily. (Patient taking differently: Take 20 mg by mouth two (2) times a day.)  traZODone (DESYREL) 50 mg tablet Take 0.5-1 Tabs by mouth nightly. (Patient taking differently: Take 25-50 mg by mouth as needed.)  traMADol (ULTRAM) 50 mg tablet TAKE 1 TABLET BY MOUTH EVERY 4 HOURS AS NEEDED (Patient taking differently: Take 50 mg by mouth every six (6) hours as needed for Pain.)  escitalopram oxalate (LEXAPRO) 20 mg tablet TAKE 1 TAB BY MOUTH DAILY. INDICATIONS: ANXIETY WITH DEPRESSION  
 sacubitril-valsartan (ENTRESTO) 24 mg/26 mg tablet Take 1 Tab by mouth two (2) times a day. INDICATIONS: CHRONIC HEART FAILURE (Patient taking differently: Take 1 Tab by mouth daily. INDICATIONS: CHRONIC HEART FAILURE)  multivit-minerals/folic acid (ADULT ONE DAILY MULTIVITAMIN PO) Take 1 Tab by mouth daily.  ascorbic acid, vitamin C, (VITAMIN C) 500 mg tablet Take 500 mg by mouth daily.  ferrous gluconate 324 mg (38 mg iron) tablet TAKE 1 TAB BY MOUTH DAILY (BEFORE BREAKFAST). INDICATIONS: IRON DEFICIENCY ANEMIA (Patient taking differently: 325 mg two (2) times daily (with meals). TAKE 1 TAB BY MOUTH DAILY (BEFORE BREAKFAST). INDICATIONS: IRON DEFICIENCY ANEMIA Takes twice a day)  fluticasone (FLONASE) 50 mcg/actuation nasal spray 2 Sprays by Both Nostrils route daily as needed.  loratadine (CLARITIN) 10 mg tablet TAKE 1 TAB BY MOUTH DAILY. (Patient taking differently: TAKE 1 TAB BY MOUTH DAILY PRN)  albuterol (PROVENTIL VENTOLIN) 2.5 mg /3 mL (0.083 %) nebulizer solution 3 mL by Nebulization route every four (4) hours as needed for Wheezing.  albuterol (VENTOLIN HFA) 90 mcg/actuation inhaler 2 puffs qid prn (Patient taking differently: Take 2 Puffs by inhalation every six (6) hours as needed for Wheezing or Shortness of Breath.)  cholecalciferol, VITAMIN D3, (VITAMIN D3) 5,000 unit tab tablet Take 1 Tab by mouth daily. (Patient taking differently: Take 2,000 Units by mouth daily.)  acetaminophen (TYLENOL) 325 mg tablet Take 650 mg by mouth every four (4) hours as needed for Pain.  OXYGEN-AIR DELIVERY SYSTEMS 3 L by Nasal route continuous.  nitroglycerin (NITROSTAT) 0.4 mg SL tablet 0.4 mg by SubLINGual route every five (5) minutes as needed for Chest Pain. No current facility-administered medications for this encounter. ALLERGIES: Sulfa (sulfonamide antibiotics) and Aspirin Social History Socioeconomic History  Marital status:  Spouse name: Not on file  Number of children: Not on file  Years of education: Not on file  Highest education level: Not on file Social Needs  Financial resource strain: Not on file  Food insecurity - worry: Not on file  Food insecurity - inability: Not on file  Transportation needs - medical: Not on file  Transportation needs - non-medical: Not on file Occupational History Employer: RETIRED Comment: [employer] Tobacco Use  Smoking status: Former Smoker Packs/day: 0.25 Years: 42.00 Pack years: 10.50 Types: Cigarettes Start date: 10/1/1956 Last attempt to quit: 2014 Years since quittin.5  Smokeless tobacco: Never Used Substance and Sexual Activity  Alcohol use: No  
  Alcohol/week: 0.0 oz  Drug use: No  
 Sexual activity: Not on file Other Topics Concern [address] Service Not Asked  Blood Transfusions Not Asked  Caffeine Concern Not Asked  Occupational Exposure Not Asked [place] Hazards Not Asked  Sleep Concern Not Asked  Stress Concern Not Asked  Weight Concern Yes  Special Diet Yes  Back Care Not Asked  Exercise Not Asked  Bike Helmet Not Asked  Seat Belt Not Asked  Self-Exams Not Asked Social History Narrative Lives with her daughter. Ambulates only short distances. Social History Tobacco Use Smoking Status Former Smoker  Packs/day: 0.25  
 Years: 42.00  
 Pack years: 10.50  Types: Cigarettes  Start date: 10/1/1956  Last attempt to quit: 2014  Years since quittin.5 Smokeless Tobacco Never Used Family History Problem Relation Age of Onset  Heart Disease Mother CHF  Hypertension Mother  Kidney Disease Mother  Heart Disease Father CHF  Lung Disease Father  Diabetes Father  Cancer Father   
     prostate  Hypertension Father  Heart Attack Father  Heart Disease Maternal Aunt  Diabetes Brother  Coronary Artery Disease Other  Breast Cancer Neg Hx   
 
 
ROS: The patient has no difficulty with chest pain or shortness of breath. No fever or chills. Comprehensive review of systems was otherwise unremarkable except as noted above. Physical Exam:  
Visit Vitals /67 (BP 1 Location: Left arm) Pulse 77 Temp 98.7 °F (37.1 °C) Resp 18 Ht 5' 2\" (1.575 m) Wt 216 lb 12.8 oz (98.3 kg) SpO2 92% BMI 39.65 kg/m² Constitutional: Alert, oriented, cooperative patient in no acute distress; appears stated age;  General appearance is within normal limits for wound care patient population. See the today's recorded vitals signs and constitutional data. Eyes: Pupils equal; Sclera are clear. EOMs intact; The eyes appear to track and move normally. The sclera are not injected. The conjunctive are clear. The eyelids are normal. There is no scleral icterus. ENMT: There are no obvious external ear, nose, lip or mouth lesions. Nares normal; Neck: Overall contour of the neck is normal with no obvious neck masses. Gross hearing is within normal limits. No obvious neck masses CV: RRR;  no JVD; No evidence of cyanosis of the upper extremities. The extremities are perfused without embolic sign, splinter hemorrhages, or petechia. Resp:  Breathing is non-labored; normal rate and effort; no audible wheezing. GI: soft and non-distended without acute abnormality noted. Musculoskeletal: unremarkable with normal function. No embolic signs or cyanosis. Neuro:  Oriented; moves all 4; no focal deficits Psychiatric: Judgement and insight are within normal limits for the wound care population of patients. Patient is oriented to person, place, and time. Recent and remote memory are within normal limits. Mood and affect are within normal limits. Integumentary (Skin/Wounds) See inspection of wound(s) below. There are no other skin areas of palpable concern. See wound center documentation including photos in the [facility] [address] Wound Template made part of this record by reference. Wounds: 
Wound Knee Anterior; Left (Active) DRESSING STATUS Clean, dry, and intact 11/1/2018  1:43 PM  
DRESSING TYPE Negative pressure wound therapy 11/1/2018  1:43 PM  
Non-Pressure Injury Full thickness (subcut/muscle) 11/1/2018  1:43 PM  
Wound Length (cm) 6 cm 11/1/2018  1:43 PM  
Wound Width (cm) 9 cm 11/1/2018  1:43 PM  
Wound Depth (cm) 0.1 11/1/2018  1:43 PM  
Wound Surface area (cm^2) 54 cm^2 11/1/2018  1:43 PM  
Change in Wound Size % 4.02 11/1/2018  1:43 PM  
Condition of Base Granulation 11/1/2018  1:43 PM  
Condition of Edges Open 11/1/2018  1:43 PM  
Tissue Type Red 11/1/2018  1:43 PM  
Tissue Type Percent Red 100 11/1/2018  1:43 PM  
Drainage Amount  Moderate 11/1/2018  1:43 PM  
Drainage Color Serosanguinous 11/1/2018  1:43 PM  
Wound Odor None 11/1/2018  1:43 PM  
Periwound Skin Condition Edematous 11/1/2018  1:43 PM  
Cleansing and Cleansing Agents  Soap and water 11/1/2018  1:43 PM  
Number of days: 14  
   
[REMOVED] Wound Knee Left (Removed) Number of days: 29  
   
[REMOVED] Wound (Removed) Number of days: 6 Recent vitals (if inpt): 
Patient Vitals for the past 24 hrs: 
 BP Temp Pulse Resp SpO2 Height Weight 11/01/18 1334 129/67 98.7 °F (37.1 °C) 77 18 92 % 5' 2\" (1.575 m) 216 lb 12.8 oz (98.3 kg) Labs: 
No results for input(s): WBC, HGB, PLT, NA, K, CL, CO2, BUN, CREA, GLU, PTP, INR, APTT, TBIL, TBILI, CBIL, SGOT, GPT, ALT, AP, AML, LPSE, LCAD, NH4, TROPT, TROIQ, PCO2, PO2, HCO3, HGBEXT, PLTEXT, HGBEXT, PLTEXT in the last 72 hours. No lab exists for component:  PH, INREXT, INREXT Lab Results Component Value Date/Time  WBC 7.2 10/23/2018 10:41 AM  
 HGB 8.5 (L) 10/23/2018 10:41 AM  
 PLATELET 871 10/53/6367 10:41 AM  
 Sodium 142 10/23/2018 10:41 AM  
 Potassium 4.4 10/23/2018 10:41 AM  
 Chloride 105 10/23/2018 10:41 AM  
 CO2 34 (H) 10/23/2018 10:41 AM  
 BUN 33 (H) 10/23/2018 10:41 AM  
 Creatinine 1.53 (H) 10/23/2018 10:41 AM  
 Glucose 163 (H) 10/23/2018 10:41 AM  
 INR 2.3 10/10/2018 04:02 AM  
 INR, External 2.1 10/29/2018  
 aPTT 60.2 (H) 10/10/2018 04:02 AM  
 Bilirubin, total 0.5 10/23/2018 10:41 AM  
 AST (SGOT) 15 10/23/2018 10:41 AM  
 ALT (SGPT) 16 10/23/2018 10:41 AM  
 Alk. phosphatase 49 (L) 10/23/2018 10:41 AM  
 Amylase 88 01/31/2012 03:12 PM  
 Lipase 157 10/08/2018 04:26 AM  
 Lactic acid 0.7 02/16/2016 06:16 AM  
 Troponin-I <0.05 01/31/2012 03:32 PM  
 Troponin-I, Qt. 0.08 (H) 12/01/2016 02:20 AM  
 
 
I reviewed recent labs and recent radiologic studies. I independently reviewed radiology images for studies I described above or studies I have ordered. Admission date (for inpatients): 11/1/2018 * No surgery found *  * No surgery found * ASSESSMENT/PLAN: 
Significant improvement since discharge from the hospital status post debridement on 10/7/2018 and VAC placement. She continues with 3 times per week VAC changes. The wound is large and full-thickness. The base is granulating nicely. We performed for skin substitute graft today using PuraplyAM.  This was well-tolerated. We will perform another grafting next week. We will continue with the VAC. This will help keep the grafts in place especially as we transition to autograft. Follow-up in 1 week. Problem List  Date Reviewed: 10/24/2018 Codes Class Noted Infection of left knee ([facility] ICD-10-CM: M00.9 ICD-9-CM: 686.9  10/17/2018 Open wound of left lower extremity with complication NUQ-89-OB: N04.624E ICD-9-CM: 891.1  10/7/2018 Abscess ICD-10-CM: L02.91 
ICD-9-CM: 682.9  10/6/2018 History of incision and drainage ICD-10-CM: Z98.890 ICD-9-CM: V45.89  10/6/2018 Traumatic hematoma of left knee (Chronic) ICD-10-CM: W82.66EB ICD-9-CM: 924.11  9/8/2018 Debility ICD-10-CM: R53.81 ICD-9-CM: 799.3  9/8/2018 Anemia (Chronic) ICD-10-CM: D64.9 ICD-9-CM: 285.9  9/7/2018 Chronic respiratory failure with hypoxia [facility]) ICD-10-CM: J96.11 
ICD-9-CM: 518.83, 799.02  9/7/2018 Encounter for immunization ICD-10-CM: B09 ICD-9-CM: V03.89  8/21/2018 Obesity, morbid ([facility] (Chronic) ICD-10-CM: E66.01 
ICD-9-CM: 278.01  6/8/2018 Physical debility (Chronic) ICD-10-CM: R53.81 ICD-9-CM: 799.3  5/2/2018 Closed nondisplaced fracture of shaft of fifth metacarpal bone of left hand ICD-10-CM: O99.092M ICD-9-CM: 815.03  4/28/2018 Long term (current) use of anticoagulants (Chronic) ICD-10-CM: Z79.01 
ICD-9-CM: V58.61  4/19/2018 Dysthymia ICD-10-CM: F34.1 ICD-9-CM: 300.4  4/5/2018 Type 2 diabetes mellitus with nephropathy (HCC) (Chronic) ICD-10-CM: E11.21 
ICD-9-CM: 250.40, 583.81  12/18/2017 S/P AVR (aortic valve replacement) (Chronic) ICD-10-CM: Z95.2 ICD-9-CM: V43.3  Unknown Overview Signed 2/23/2016 11:04 AM by Fuad Melendez Mechanical/Artific Cardiomyopathy ([facility] (Chronic) ICD-10-CM: I42.9 ICD-9-CM: 425.4  Unknown Osteopenia ICD-10-CM: M85.80 ICD-9-CM: 733.90  Unknown HLD (hyperlipidemia) (Chronic) ICD-10-CM: J10.0 ICD-9-CM: 272.4  Unknown Osteoarthritis ICD-10-CM: M19.90 ICD-9-CM: 715.90  Unknown  
   
 ICD (implantable cardioverter-defibrillator) in place ICD-10-CM: Z95.810 ICD-9-CM: V45.02  10/2/2014 Overview Signed 10/2/2014  4:58 PM by Nicky Cali single-chamber ICD implantation 10/20/14 Diabetes mellitus type 2, diet-controlled (HCC) (Chronic) ICD-10-CM: E11.9 ICD-9-CM: 250.00  8/28/2014 Overview Signed 10/6/2018  8:56 PM by Jorje Stanley NP  
  A1C: 5.7 COPD (chronic obstructive pulmonary disease) (HCC) (Chronic) ICD-10-CM: J44.9 ICD-9-CM: 013  4/2/2014 Overview Signed 10/6/2018  8:56 PM by Jorje Stanley NP  
  HOME OXYGEN 3 LITERS 
  
  
   
 REJI (obstructive sleep apnea)-cpap (Chronic) ICD-10-CM: G47.33 
ICD-9-CM: 327.23  4/2/2014 CKD (chronic kidney disease) stage 3, GFR 30-59 ml/min (HCC) (Chronic) ICD-10-CM: N18.3 ICD-9-CM: 585.3  7/10/2013 Anticoagulated on Coumadin (Chronic) ICD-10-CM: Z51.81, Z79.01 
ICD-9-CM: V58.83, V58.61  7/9/2013 Overview Signed 7/9/2013  4:16 PM by Anselmo Mortensen NP  
  S/P AVR 
  
  
   
 CAD (coronary artery disease) (Chronic) ICD-10-CM: I25.10 ICD-9-CM: 414.00  1/20/2013 Overview Signed 5/20/2014 10:05 AM by Renetta Ventura NP  
  5/8/14 PCI LAD with stent placed Chronic combined systolic and diastolic heart failure (HCC) (Chronic) ICD-10-CM: I50.42 
ICD-9-CM: 428.42  1/20/2013 Overview Addendum 4/28/2018  4:53 AM by Nano Guzmán MD  
  5/8/14 ECHO:  EF 10-15% 12/2017:  EF 25-30% Essential hypertension, benign (Chronic) ICD-10-CM: I10 
ICD-9-CM: 401.1  1/20/2013 MDS (myelodysplastic syndrome) (HCC) (Chronic) ICD-10-CM: D46.9 ICD-9-CM: 238.75  12/17/2011 Overview Addendum 8/29/2013 11:06 AM by Kendra Powerrit started in August, 2011 12/18/11 Procrit weekly and Iron stores. 5-12 12-13-12 good response to 3 weekly procrit did not need it last time 3-7-13 Pt doing well. Just wanted a \"check-up. \" Responding to Procrit every three weeks. 8-29-13 patient has missed some injections on recently restarted hemoglobin only issue , takes oral iron iron stores each time Iron deficiency anemia due to chronic blood loss (Chronic) ICD-10-CM: D50.0 ICD-9-CM: 280.0  7/29/2009 Active Problems: * No active hospital problems. * Any procedures done today in the wound center are documented in a separate note in connect care made part of this record by reference Wound Care Orders Placed This Encounter  WOUND CARE, DRESSING CHANGE Left knee Cleanse wound and periwound with wound cleanser or normal saline. Puraply placed by Dr Chayo Baez and secured with mepilex transfer. Negative Pressure Therapy(KCI Wound Vac, Medela or SNAP) Black granufoam, 125mmHg, continuous. Change 3 times per week. If pump malfunctions and requires change on non-scheduled day, patient to return and have pump reapplied by clinician Leave in place for entire week. Home health to check on wound vac and may replace canister as needed but do not remove wound vac. Follow up in one week at wound center. Will plan on Puraply at next visit. Standing Status:   Standing Number of Occurrences:   1 Follow-up Information Follow up With Specialties Details Why Contact Info [facility] WOUND [facility] Wound Care In 1 week  Marcial Walker [phone] 
[phone] Signed:  Cornel Guevara MD,  FACS

## 2018-11-01 NOTE — WOUND CARE
[address] [place] [address] [address] Phone: [phone] Fax: [phone] Patient: Esthela Loo MRN: [id number]  SSN: [id number] YOB: 1950  Age: 77 y.o. Sex: female Return Appointment: 1 week with Amparo Westbrook MD 
 
Instructions: Left knee Cleanse wound and periwound with wound cleanser or normal saline. Puraply placed by Dr Hazel Mcelroy and secured with mepilex transfer. Negative Pressure Therapy(KCI Wound Vac, Medela or SNAP) Black granufoam, 125mmHg, continuous. Change 3 times per week. If pump malfunctions and requires change on non-scheduled day, patient to return and have pump reapplied by clinician Leave in place for entire week. Home health to check on wound vac and may replace canister as needed but do not remove wound vac. Follow up in one week at wound center. Will plan on Puraply at next visit. Should you experience increased redness, swelling, pain, foul odor, size of wound(s), or have a temperature over 101 degrees please contact the [address] at [phone] or if after hours contact your primary care physician or go to the hospital emergency department. Signed By: Regine Rosales November 1, 2018

## 2018-11-01 NOTE — PROCEDURES
Wound Center Application of Skin Substitute Patient: Kelly Nance MRN: [id number]  SSN: [id number] YOB: 1950  Age: 77 y.o. Sex: female November 1, 2018 Time Out Taken: Yes Procedure Performed By: Rebeca Morton MD 
 
Prior to grafting, the left LE wound was surgically prepared for grafting with a #15 scalpel by incising through peripheral scar and cutting radial fenestrations through the scar to open up graft exposure to deeper bleeding granulation and saline moistened guaze to abrade away biofilm. Product Preparation - 's Guidelines Followed. Wound: 1 Left Trauma Other site To Fat Layer Application 1 out of 10 Product Applied: Puraply Application Area: 54  sq cm Amount Applied: 54  sq cm Amount Wasted: None Reconstituted with Normal Saline. Lot Number: [id number]. 1.1c Expiration Date: 3/24/2021 Fenestrated: Yes with Blade Affixed: Mepilex transfer Dressing Applied: mepilex transfer, NPWT Procedural Pain: Insensate Post-Procedural Pain: 0 Response to Procedure: tolerated the procedure well with no complications Patient instructed to keep wound area dry. Only change outer dressing if needed. Signed By: Krupa Scott November 1, 2018 Hieu Flores MD

## 2018-11-02 ENCOUNTER — HOME CARE VISIT (OUTPATIENT)
Dept: SCHEDULING | Facility: HOME HEALTH | Age: 68
End: 2018-11-02
Payer: MEDICARE

## 2018-11-02 PROCEDURE — 3331090001 HH PPS REVENUE CREDIT

## 2018-11-02 PROCEDURE — 3331090002 HH PPS REVENUE DEBIT

## 2018-11-03 PROCEDURE — 3331090002 HH PPS REVENUE DEBIT

## 2018-11-03 PROCEDURE — 3331090001 HH PPS REVENUE CREDIT

## 2018-11-04 PROCEDURE — 3331090001 HH PPS REVENUE CREDIT

## 2018-11-04 PROCEDURE — 3331090002 HH PPS REVENUE DEBIT

## 2018-11-05 ENCOUNTER — HOME CARE VISIT (OUTPATIENT)
Dept: SCHEDULING | Facility: HOME HEALTH | Age: 68
End: 2018-11-05
Payer: MEDICARE

## 2018-11-05 PROCEDURE — 3331090002 HH PPS REVENUE DEBIT

## 2018-11-05 PROCEDURE — G0299 HHS/HOSPICE OF RN EA 15 MIN: HCPCS

## 2018-11-05 PROCEDURE — 3331090001 HH PPS REVENUE CREDIT

## 2018-11-06 ENCOUNTER — HOME CARE VISIT (OUTPATIENT)
Dept: SCHEDULING | Facility: HOME HEALTH | Age: 68
End: 2018-11-06
Payer: MEDICARE

## 2018-11-06 VITALS
TEMPERATURE: 97.1 F | RESPIRATION RATE: 18 BRPM | HEART RATE: 72 BPM | OXYGEN SATURATION: 97 % | SYSTOLIC BLOOD PRESSURE: 146 MMHG | DIASTOLIC BLOOD PRESSURE: 88 MMHG

## 2018-11-06 PROCEDURE — 3331090001 HH PPS REVENUE CREDIT

## 2018-11-06 PROCEDURE — G0158 HHC OT ASSISTANT EA 15: HCPCS

## 2018-11-06 PROCEDURE — 3331090002 HH PPS REVENUE DEBIT

## 2018-11-07 ENCOUNTER — HOME CARE VISIT (OUTPATIENT)
Dept: SCHEDULING | Facility: HOME HEALTH | Age: 68
End: 2018-11-07
Payer: MEDICARE

## 2018-11-07 PROCEDURE — 3331090001 HH PPS REVENUE CREDIT

## 2018-11-07 PROCEDURE — 3331090002 HH PPS REVENUE DEBIT

## 2018-11-08 ENCOUNTER — HOSPITAL ENCOUNTER (OUTPATIENT)
Dept: WOUND CARE | Age: 68
Discharge: HOME | End: 2018-11-08
Attending: SURGERY
Payer: MEDICARE

## 2018-11-08 VITALS
HEART RATE: 68 BPM | TEMPERATURE: 97.8 F | WEIGHT: 216.2 LBS | OXYGEN SATURATION: 92 % | SYSTOLIC BLOOD PRESSURE: 126 MMHG | HEIGHT: 62 IN | BODY MASS INDEX: 39.79 KG/M2 | DIASTOLIC BLOOD PRESSURE: 71 MMHG | RESPIRATION RATE: 18 BRPM

## 2018-11-08 DIAGNOSIS — L98.9 SKIN DEFECT: ICD-10-CM

## 2018-11-08 DIAGNOSIS — S81.802D OPEN WOUND OF LEFT LOWER EXTREMITY WITH COMPLICATION, SUBSEQUENT ENCOUNTER: ICD-10-CM

## 2018-11-08 DIAGNOSIS — Z79.01 ANTICOAGULATED ON COUMADIN: Chronic | ICD-10-CM

## 2018-11-08 DIAGNOSIS — E11.9 DIABETES MELLITUS TYPE 2, DIET-CONTROLLED (HCC): Chronic | ICD-10-CM

## 2018-11-08 DIAGNOSIS — S80.02XS TRAUMATIC HEMATOMA OF LEFT KNEE, SEQUELA: Chronic | ICD-10-CM

## 2018-11-08 PROCEDURE — 99214 OFFICE O/P EST MOD 30 MIN: CPT | Performed by: SURGERY

## 2018-11-08 PROCEDURE — 99214 OFFICE O/P EST MOD 30 MIN: CPT

## 2018-11-08 PROCEDURE — 3331090001 HH PPS REVENUE CREDIT

## 2018-11-08 PROCEDURE — 3331090002 HH PPS REVENUE DEBIT

## 2018-11-08 NOTE — WOUND CARE
[address] [place] [address] [address] Phone: [phone] Fax: [phone] Patient: Meg Umaña MRN: [id number]  SSN: [id number] YOB: 1950  Age: 77 y.o. Sex: female Return Appointment: 1 week with Nikole Young MD 
 
Instructions: Left knee Cleanse wound and periwound with wound cleanser or normal saline. Puraply left in place. Fenestrated Mepilex transfer to wound bed. Negative Pressure Therapy(KCI Wound Vac, Medela or SNAP) Black granufoam, 125mmHg, continuous. .  If pump malfunctions and requires change on non-scheduled day, patient to return and have pump reapplied by clinician Leave in place for entire week. Home health to check vac 3x weeklly and change canister as needed. Plan for puraply at next visit. Should you experience increased redness, swelling, pain, foul odor, size of wound(s), or have a temperature over 101 degrees please contact the [address] [address] [facility] [address] at [phone] or if after hours contact your primary care physician or go to the hospital emergency department. Signed By: Mike Malin November 8, 2018

## 2018-11-08 NOTE — WOUND CARE
11/08/18 1421 Wound Knee Anterior; Left Date First Assessed/Time First Assessed: 10/18/18 1505   POA: Yes  Wound Type: Trauma  Location: Knee  Wound Description (Optional): #1  Orientation: Anterior; Left DRESSING STATUS Clean, dry, and intact DRESSING TYPE (puraply, NPWT) Non-Pressure Injury Full thickness (subcut/muscle) Wound Length (cm) 6 cm Wound Width (cm) 9 cm Wound Depth (cm) 0.1 Wound Surface area (cm^2) 54 cm^2 Change in Wound Size % 4.02 Condition of Base Granulation 
(puraply left in place) Drainage Amount  Moderate Drainage Color Serous Wound Odor None Periwound Skin Condition Intact Cleansing and Cleansing Agents  Soap and water Dressing Type Applied (mepilex transfer, NPWT)

## 2018-11-09 PROCEDURE — 3331090002 HH PPS REVENUE DEBIT

## 2018-11-09 PROCEDURE — 3331090001 HH PPS REVENUE CREDIT

## 2018-11-09 NOTE — PROGRESS NOTES
WOUND CENTER Consult Note/Progress Note:  
Chetan Lara  MRN: [id number]  DAZ:[birth year]  Age:68 y.o. 
 
HPI: Chetan Lara is a 77 y.o. female who is a 77 y.o. female \"with extensive medical history as noted below on chronic coumadin who initially presented to ER 9/7 after fall in bathroom 8/28 where she landed directly on her left knee with complaints of dizziness and fatigue and found to have Hgb of 6.7.  She was transfused to Hgb of 9.6.  She also was found with a large hematoma/bullae over her left knee and INR of 4.3.  She underwent an I+D per Dr Kathie Pollack on 9/11 with a large amount of serosanguinous fluid evacuated. Felicita Guevara has been followed by home health PT, OT and wound care since discharge from rehab on ninth floor 9/11-9/21. Felicita Guevara has done well except the wound has not closed.  Two days ago, Henrik Garvey RN and daughter who is an RN noted mild erythema and heat surrounding upper half of wound.  Open area approx 5-6 cm in circumference.  No purulent drainage, oozing small amounts of dark blood with slightly foul odor.  Full ROM to knee.  All distal circulation, motion and sensation WNL for patient.  No distal edema, mild local edema.  Multiple old scars from prior lower extremity wounds.  Patient denies pain to area, fever, systemic symptoms.  She does not appear septic and lactic acid is 1.8.  WBC is 11.6 without shift.  Wound cultured in ER prior to administration of antibiotics.  Begun on vancomycin and ceftriaxone until cultures resulted. Ronny Guzman, attentive family at bedside. Creatinine 1.48 on admission with baseline of 1.2.  Of note, patient reports diarrhea x 4 days without use of antibiotics, additional GI symptoms. \"   
  
  
10/7/18: Pt sitting up on side of the bed just finishing breakfast. No complaints. AF, NAD. WBC 8.8, Pt taking Coumadin 2.5mg qhs. INR 3.0. 
10/8/18: POD1 s/p left knee debridement; awake in bed, no complaints. AF, VSS. Cx growing P. Mirabilis. 10/9/18: POD2 s/p left knee debridement; awake in chair, no complaints. AF, VSS. Cx growing P. Mirabilis. Vac placed today and functioning without leak 10/10/18: POD3 s/p left knee debridement; awake in chair, no complaints. AF, VSS. Cx growing P. Mirabilis. Vac functioning without leak or bleeding This information was obtained from the patient The following HPI elements were documented for the patient's wound: 
Location: L below knee anterior Duration: 8/28/2018 Severity:  No pain Context:  Fall in bathroom on coumadin, secondary infection Modifying Factors:  Nothing makes better or worse. Improved with debridement on 10/7/2018 and VAC Quality:  n/a Timing:  n/a Associated Signs and Symptoms: 
 
 
 
Past Medical History:  
Diagnosis Date  Anticoagulated on Coumadin 7/9/2013 S/P AVR  CAD (coronary artery disease) 1/20/2013 5/8/14 PCI LAD with stent placed  Cardiomyopathy ([address]  CHF (congestive heart failure) ([address] 2/17/2017  CKD (chronic kidney disease) stage 3, GFR 30-59 ml/min ([facility]) 7/10/2013  COPD (chronic obstructive pulmonary disease) ([address] 4/2/2014  Essential hypertension, benign 1/20/2013  HLD (hyperlipidemia)  ICD (implantable cardioverter-defibrillator) in place 10/2/2014 Bi[employer] single-chamber ICD implantation 10/20/14  Iron deficiency anemia due to chronic blood loss 7/29/2009  MDS (myelodysplastic syndrome) ([address] 12/17/2011 Procrit started in August, 2011 12/18/11 Procrit weekly and Iron stores. 5-12 12-13-12 good response to 3 weekly procrit did not need it last time  3-7-13 Pt doing well. Just wanted a \"check-up. \" Responding to Procrit every three weeks. 8-29-13 patient has missed some injections on recently restarted hemoglobin only issue , takes oral iron iron stores each time  REJI (obstructive sleep apnea)-cpap 4/2/2014  Osteoarthritis  Osteopenia  Pulmonary hypertension ([address] 6/15/2016  Quadrantanopia  Skin defect 2018  Visual complaint 2015 Past Surgical History:  
Procedure Laterality Date [address]    HX AORTIC VALVE REPLACEMENT  ,   
 mechanical valve   HX  SECTION    
 HX CORONARY STENT PLACEMENT  May, 2014 STEMI with Stent placement.  HX ENDOSCOPY  2009 EGD [address] CATHETERIZATION    HX PACEMAKER  10/2/2014 [employer] ICD  HX TUBAL LIGATION    
 IR BX BONE MARROW DIAGNOSTIC  2011 Current Outpatient Medications Medication Sig  
 fluticasone-vilanterol (BREO ELLIPTA) 200-25 mcg/dose inhaler Take 1 Puff by inhalation daily.  simvastatin (ZOCOR) 20 mg tablet Take  by mouth nightly.  OTHER Trilogy  mometasone-formoterol (DULERA) 200-5 mcg/actuation HFA inhaler 2 puffs bid, rinse mouth after use.  simvastatin 20 mg/5 mL (4 mg/mL) susp Take  by mouth.  isosorbide mononitrate ER (IMDUR) 30 mg tablet Take 30 mg by mouth.  aspirin delayed-release 81 mg tablet Take 81 mg by mouth.  cholecalciferol (VITAMIN D3) 1,000 unit cap Take  by mouth.  spironolactone (ALDACTONE) 25 mg tablet Take 25 mg by mouth.  warfarin (COUMADIN) 2.5 mg tablet Take 2.5 mg by mouth nightly.  docusate sodium (COLACE) 50 mg capsule Take 50 mg by mouth daily.  carvedilol (COREG) 6.25 mg tablet Take 1 Tab by mouth two (2) times daily (with meals). (Patient taking differently: Take 3.125 mg by mouth two (2) times daily (with meals). )  furosemide (LASIX) 40 mg tablet Take 1 Tab by mouth daily. (Patient taking differently: Take 20 mg by mouth two (2) times a day.)  traZODone (DESYREL) 50 mg tablet Take 0.5-1 Tabs by mouth nightly. (Patient taking differently: Take 25-50 mg by mouth as needed.)  traMADol (ULTRAM) 50 mg tablet TAKE 1 TABLET BY MOUTH EVERY 4 HOURS AS NEEDED (Patient taking differently: Take 50 mg by mouth every six (6) hours as needed for Pain.)  escitalopram oxalate (LEXAPRO) 20 mg tablet TAKE 1 TAB BY MOUTH DAILY. INDICATIONS: ANXIETY WITH DEPRESSION  
 sacubitril-valsartan (ENTRESTO) 24 mg/26 mg tablet Take 1 Tab by mouth two (2) times a day. INDICATIONS: CHRONIC HEART FAILURE (Patient taking differently: Take 1 Tab by mouth daily. INDICATIONS: CHRONIC HEART FAILURE)  multivit-minerals/folic acid (ADULT ONE DAILY MULTIVITAMIN PO) Take 1 Tab by mouth daily.  ascorbic acid, vitamin C, (VITAMIN C) 500 mg tablet Take 500 mg by mouth daily.  ferrous gluconate 324 mg (38 mg iron) tablet TAKE 1 TAB BY MOUTH DAILY (BEFORE BREAKFAST). INDICATIONS: IRON DEFICIENCY ANEMIA (Patient taking differently: 325 mg two (2) times daily (with meals). TAKE 1 TAB BY MOUTH DAILY (BEFORE BREAKFAST). INDICATIONS: IRON DEFICIENCY ANEMIA Takes twice a day)  fluticasone (FLONASE) 50 mcg/actuation nasal spray 2 Sprays by Both Nostrils route daily as needed.  loratadine (CLARITIN) 10 mg tablet TAKE 1 TAB BY MOUTH DAILY. (Patient taking differently: TAKE 1 TAB BY MOUTH DAILY PRN)  albuterol (PROVENTIL VENTOLIN) 2.5 mg /3 mL (0.083 %) nebulizer solution 3 mL by Nebulization route every four (4) hours as needed for Wheezing.  albuterol (VENTOLIN HFA) 90 mcg/actuation inhaler 2 puffs qid prn (Patient taking differently: Take 2 Puffs by inhalation every six (6) hours as needed for Wheezing or Shortness of Breath.)  cholecalciferol, VITAMIN D3, (VITAMIN D3) 5,000 unit tab tablet Take 1 Tab by mouth daily. (Patient taking differently: Take 2,000 Units by mouth daily.)  acetaminophen (TYLENOL) 325 mg tablet Take 650 mg by mouth every four (4) hours as needed for Pain.  OXYGEN-AIR DELIVERY SYSTEMS 3 L by Nasal route continuous.  nitroglycerin (NITROSTAT) 0.4 mg SL tablet 0.4 mg by SubLINGual route every five (5) minutes as needed for Chest Pain. No current facility-administered medications for this encounter. ALLERGIES: Sulfa (sulfonamide antibiotics) and Aspirin Social History Socioeconomic History  Marital status:  Spouse name: Not on file  Number of children: Not on file  Years of education: Not on file  Highest education level: Not on file Social Needs  Financial resource strain: Not on file  Food insecurity - worry: Not on file  Food insecurity - inability: Not on file  Transportation needs - medical: Not on file  Transportation needs - non-medical: Not on file Occupational History Employer: RETIRED Comment: [employer] Tobacco Use  Smoking status: Former Smoker Packs/day: 0.25 Years: 42.00 Pack years: 10.50 Types: Cigarettes Start date: 10/1/1956 Last attempt to quit: 2014 Years since quittin.5  Smokeless tobacco: Never Used Substance and Sexual Activity  Alcohol use: No  
  Alcohol/week: 0.0 oz  Drug use: No  
 Sexual activity: Not on file Other Topics Concern [address] Service Not Asked  Blood Transfusions Not Asked  Caffeine Concern Not Asked  Occupational Exposure Not Asked Jocelynn Kennett Hazards Not Asked  Sleep Concern Not Asked  Stress Concern Not Asked  Weight Concern Yes  Special Diet Yes  Back Care Not Asked  Exercise Not Asked  Bike Helmet Not Asked  Seat Belt Not Asked  Self-Exams Not Asked Social History Narrative Lives with her daughter. Ambulates only short distances. Social History Tobacco Use Smoking Status Former Smoker  Packs/day: 0.25  
 Years: 42.00  
 Pack years: 10.50  Types: Cigarettes  Start date: 10/1/1956  Last attempt to quit: 2014  Years since quittin.5 Smokeless Tobacco Never Used Family History Problem Relation Age of Onset  Heart Disease Mother CHF  Hypertension Mother  Kidney Disease Mother  Heart Disease Father CHF  Lung Disease Father  Diabetes Father  Cancer Father   
     prostate  Hypertension Father  Heart Attack Father  Heart Disease Maternal Aunt  Diabetes Brother  Coronary Artery Disease Other  Breast Cancer Neg Hx   
 
 
ROS: The patient has no difficulty with chest pain or shortness of breath. No fever or chills. Comprehensive review of systems was otherwise unremarkable except as noted above. Physical Exam:  
Visit Vitals /71 (BP 1 Location: Left arm) Pulse 68 Temp 97.8 °F (36.6 °C) Resp 18 Ht 5' 2\" (1.575 m) Wt 216 lb 3.2 oz (98.1 kg) SpO2 92% BMI 39.54 kg/m² Constitutional: Alert, oriented, cooperative patient in no acute distress; appears stated age;  General appearance is within normal limits for wound care patient population. See the today's recorded vitals signs and constitutional data. Eyes: Pupils equal; Sclera are clear. EOMs intact; The eyes appear to track and move normally. The sclera are not injected. The conjunctive are clear. The eyelids are normal. There is no scleral icterus. ENMT: There are no obvious external ear, nose, lip or mouth lesions. Nares normal; Neck: Overall contour of the neck is normal with no obvious neck masses. Gross hearing is within normal limits. No obvious neck masses CV: RRR;  no JVD; No evidence of cyanosis of the upper extremities. The extremities are perfused without embolic sign, splinter hemorrhages, or petechia. Resp:  Breathing is non-labored; normal rate and effort; no audible wheezing. GI: soft and non-distended without acute abnormality noted. Musculoskeletal: unremarkable with normal function. No embolic signs or cyanosis. Neuro:  Oriented; moves all 4; no focal deficits Psychiatric: Judgement and insight are within normal limits for the wound care population of patients. Patient is oriented to person, place, and time. Recent and remote memory are within normal limits. Mood and affect are within normal limits. Integumentary (Skin/Wounds) See inspection of wound(s) below. There are no other skin areas of palpable concern. See wound center documentation including photos in the [facility] [address] Wound Template made part of this record by reference. Wounds: 
Wound Knee Anterior; Left (Active) DRESSING STATUS Clean, dry, and intact 11/8/2018  2:21 PM  
DRESSING TYPE Negative pressure wound therapy 11/1/2018  1:43 PM  
Non-Pressure Injury Full thickness (subcut/muscle) 11/8/2018  2:21 PM  
Wound Length (cm) 6 cm 11/8/2018  2:21 PM  
Wound Width (cm) 9 cm 11/8/2018  2:21 PM  
Wound Depth (cm) 0.1 11/8/2018  2:21 PM  
Wound Surface area (cm^2) 54 cm^2 11/8/2018  2:21 PM  
Change in Wound Size % 4.02 11/8/2018  2:21 PM  
Condition of Base Granulation 11/8/2018  2:21 PM  
Condition of Edges Open 11/1/2018  1:43 PM  
Tissue Type Red 11/1/2018  1:43 PM  
Tissue Type Percent Red 100 11/1/2018  1:43 PM  
Drainage Amount  Moderate 11/8/2018  2:21 PM  
Drainage Color Serous 11/8/2018  2:21 PM  
Wound Odor None 11/8/2018  2:21 PM  
Periwound Skin Condition Intact 11/8/2018  2:21 PM  
Cleansing and Cleansing Agents  Soap and water 11/8/2018  2:21 PM  
Number of days: 22 Recent vitals (if inpt): 
Patient Vitals for the past 24 hrs: 
 BP Temp Pulse Resp SpO2 Height Weight 11/08/18 1453 126/71 97.8 °F (36.6 °C) 68 18 92 % 5' 2\" (1.575 m) 216 lb 3.2 oz (98.1 kg) Labs: 
No results for input(s): WBC, HGB, PLT, NA, K, CL, CO2, BUN, CREA, GLU, PTP, INR, APTT, TBIL, TBILI, CBIL, SGOT, GPT, ALT, AP, AML, LPSE, LCAD, NH4, TROPT, TROIQ, PCO2, PO2, HCO3, HGBEXT, PLTEXT, HGBEXT, PLTEXT in the last 72 hours. No lab exists for component:  PH, INREXT, INREXT Lab Results Component Value Date/Time  WBC 7.2 10/23/2018 10:41 AM  
 HGB 8.5 (L) 10/23/2018 10:41 AM  
 PLATELET 310 04/69/2980 10:41 AM  
 Sodium 142 10/23/2018 10:41 AM  
 Potassium 4.4 10/23/2018 10:41 AM  
 Chloride 105 10/23/2018 10:41 AM  
 CO2 34 (H) 10/23/2018 10:41 AM  
 BUN 33 (H) 10/23/2018 10:41 AM  
 Creatinine 1.53 (H) 10/23/2018 10:41 AM  
 Glucose 163 (H) 10/23/2018 10:41 AM  
 INR 2.3 10/10/2018 04:02 AM  
 INR, External 4.3 11/05/2018  
 aPTT 60.2 (H) 10/10/2018 04:02 AM  
 Bilirubin, total 0.5 10/23/2018 10:41 AM  
 AST (SGOT) 15 10/23/2018 10:41 AM  
 ALT (SGPT) 16 10/23/2018 10:41 AM  
 Alk. phosphatase 49 (L) 10/23/2018 10:41 AM  
 Amylase 88 01/31/2012 03:12 PM  
 Lipase 157 10/08/2018 04:26 AM  
 Lactic acid 0.7 02/16/2016 06:16 AM  
 Troponin-I <0.05 01/31/2012 03:32 PM  
 Troponin-I, Qt. 0.08 (H) 12/01/2016 02:20 AM  
 
 
I reviewed recent labs and recent radiologic studies. I independently reviewed radiology images for studies I described above or studies I have ordered. Admission date (for inpatients): 11/8/2018 * No surgery found *  * No surgery found * ASSESSMENT/PLAN: 
Significant improvement since discharge from the hospital status post debridement on 10/7/2018 and VAC placement. She had PuraplyAM skin substitute graft on 11/1/2018. The VAC was maintained for 1 week without change. The wound is large and full-thickness. New graft was not placed today as there was significant intact graft remaining. We will perform another grafting next week. We will continue with the VAC. This will help keep the grafts in place especially as we transition to autograft. Follow-up in 1 week. Problem List  Date Reviewed: 11/9/2018 Codes Class Noted Skin defect ICD-10-CM: L98.9 ICD-9-CM: 709.8  11/1/2018 Infection of left knee ([address] ICD-10-CM: M00.9 ICD-9-CM: 686.9  10/17/2018 Open wound of left lower extremity with complication [id number]: I63.837X ICD-9-CM: 891.1  10/7/2018 Abscess ICD-10-CM: L02.91 
ICD-9-CM: 682.9  10/6/2018 History of incision and drainage ICD-10-CM: Z98.890 ICD-9-CM: V45.89  10/6/2018 Traumatic hematoma of left knee (Chronic) ICD-10-CM: V83.54MB ICD-9-CM: 924.11  9/8/2018 Debility ICD-10-CM: R53.81 ICD-9-CM: 799.3  9/8/2018 Anemia (Chronic) ICD-10-CM: D64.9 ICD-9-CM: 285.9  9/7/2018 Chronic respiratory failure with hypoxia [facility]) ICD-10-CM: J96.11 
ICD-9-CM: 518.83, 799.02  9/7/2018 Encounter for immunization ICD-10-CM: L78 ICD-9-CM: V03.89  8/21/2018 Obesity, morbid ([facility] (Chronic) ICD-10-CM: E66.01 
ICD-9-CM: 278.01  6/8/2018 Physical debility (Chronic) ICD-10-CM: R53.81 ICD-9-CM: 799.3  5/2/2018 Closed nondisplaced fracture of shaft of fifth metacarpal bone of left hand ICD-10-CM: N92.522G ICD-9-CM: 815.03  4/28/2018 Long term (current) use of anticoagulants (Chronic) ICD-10-CM: Z79.01 
ICD-9-CM: V58.61  4/19/2018 Dysthymia ICD-10-CM: F34.1 ICD-9-CM: 300.4  4/5/2018 Type 2 diabetes mellitus with nephropathy (HCC) (Chronic) ICD-10-CM: E11.21 
ICD-9-CM: 250.40, 583.81  12/18/2017 S/P AVR (aortic valve replacement) (Chronic) ICD-10-CM: Z95.2 ICD-9-CM: V43.3  Unknown Overview Signed 2/23/2016 11:04 AM by Dixon Mcdonald Mechanical/Artific Cardiomyopathy ([facility] (Chronic) ICD-10-CM: I42.9 ICD-9-CM: 425.4  Unknown Osteopenia ICD-10-CM: M85.80 ICD-9-CM: 733.90  Unknown HLD (hyperlipidemia) (Chronic) ICD-10-CM: F28.8 ICD-9-CM: 272.4  Unknown Osteoarthritis ICD-10-CM: M19.90 ICD-9-CM: 715.90  Unknown  
   
 ICD (implantable cardioverter-defibrillator) in place ICD-10-CM: Z95.810 ICD-9-CM: V45.02  10/2/2014 Overview Signed 10/2/2014  4:58 PM by Abraham Cali single-chamber ICD implantation 10/20/14 Diabetes mellitus type 2, diet-controlled (HCC) (Chronic) ICD-10-CM: E11.9 ICD-9-CM: 250.00  8/28/2014 Overview Signed 10/6/2018  8:56 PM by Eduardo Glasgow NP  
  A1C: 5.7 COPD (chronic obstructive pulmonary disease) (HCC) (Chronic) ICD-10-CM: J44.9 ICD-9-CM: 010  4/2/2014 Overview Signed 10/6/2018  8:56 PM by Justina Rogers NP  
  HOME OXYGEN 3 LITERS 
  
  
   
 REJI (obstructive sleep apnea)-cpap (Chronic) ICD-10-CM: G47.33 
ICD-9-CM: 327.23  4/2/2014 CKD (chronic kidney disease) stage 3, GFR 30-59 ml/min (HCC) (Chronic) ICD-10-CM: N18.3 ICD-9-CM: 585.3  7/10/2013 Anticoagulated on Coumadin (Chronic) ICD-10-CM: Z51.81, Z79.01 
ICD-9-CM: V58.83, V58.61  7/9/2013 Overview Signed 7/9/2013  4:16 PM by Raoul Heredia NP  
  S/P AVR 
  
  
   
 CAD (coronary artery disease) (Chronic) ICD-10-CM: I25.10 ICD-9-CM: 414.00  1/20/2013 Overview Signed 5/20/2014 10:05 AM by Leidy Carter NP  
  5/8/14 PCI LAD with stent placed Chronic combined systolic and diastolic heart failure (HCC) (Chronic) ICD-10-CM: I50.42 
ICD-9-CM: 428.42  1/20/2013 Overview Addendum 4/28/2018  4:53 AM by Nadja Turner MD  
  5/8/14 ECHO:  EF 10-15% 12/2017:  EF 25-30% Essential hypertension, benign (Chronic) ICD-10-CM: I10 
ICD-9-CM: 401.1  1/20/2013 MDS (myelodysplastic syndrome) (HCC) (Chronic) ICD-10-CM: D46.9 ICD-9-CM: 238.75  12/17/2011 Overview Addendum 8/29/2013 11:06 AM by Dannie Galvan Procrit started in August, 2011 12/18/11 Procrit weekly and Iron stores. 5-12 12-13-12 good response to 3 weekly procrit did not need it last time 3-7-13 Pt doing well. Just wanted a \"check-up. \" Responding to Procrit every three weeks. 8-29-13 patient has missed some injections on recently restarted hemoglobin only issue , takes oral iron iron stores each time Iron deficiency anemia due to chronic blood loss (Chronic) ICD-10-CM: D50.0 ICD-9-CM: 280.0  7/29/2009 Active Problems: * No active hospital problems. * Any procedures done today in the wound center are documented in a separate note in connect care made part of this record by reference Wound Care Orders Placed This Encounter  WOUND CARE, DRESSING CHANGE Left knee Cleanse wound and periwound with wound cleanser or normal saline. Puraply left in place. Fenestrated Mepilex transfer to wound bed. Negative Pressure Therapy(KCI Wound Vac, Medela or SNAP) Black granufoam, 125mmHg, continuous. .  If pump malfunctions and requires change on non-scheduled day, patient to return and have pump reapplied by clinician Leave in place for entire week. Home health to check vac 3x weeklly and change canister as needed. Plan for puraply at next visit. Standing Status:   Standing Number of Occurrences:   1 Follow-up Information Follow up With Specialties Details Why Contact Info [facility] Wound Care In 1 week  [phone] Signed:  Ly Wu MD,  FACS

## 2018-11-10 PROCEDURE — 3331090002 HH PPS REVENUE DEBIT

## 2018-11-10 PROCEDURE — 3331090001 HH PPS REVENUE CREDIT

## 2018-11-11 PROCEDURE — 3331090001 HH PPS REVENUE CREDIT

## 2018-11-11 PROCEDURE — 3331090002 HH PPS REVENUE DEBIT

## 2018-11-12 ENCOUNTER — HOME CARE VISIT (OUTPATIENT)
Dept: HOME HEALTH SERVICES | Facility: HOME HEALTH | Age: 68
End: 2018-11-12
Payer: MEDICARE

## 2018-11-12 PROCEDURE — 3331090002 HH PPS REVENUE DEBIT

## 2018-11-12 PROCEDURE — 3331090001 HH PPS REVENUE CREDIT

## 2018-11-13 ENCOUNTER — HOSPITAL ENCOUNTER (OUTPATIENT)
Dept: LAB | Age: 68
Discharge: HOME | DRG: 291 | End: 2018-11-13
Attending: INTERNAL MEDICINE
Payer: MEDICARE

## 2018-11-13 ENCOUNTER — HOME CARE VISIT (OUTPATIENT)
Dept: SCHEDULING | Facility: HOME HEALTH | Age: 68
End: 2018-11-13
Payer: MEDICARE

## 2018-11-13 VITALS
OXYGEN SATURATION: 91 % | HEART RATE: 87 BPM | TEMPERATURE: 97.3 F | RESPIRATION RATE: 19 BRPM | DIASTOLIC BLOOD PRESSURE: 82 MMHG | SYSTOLIC BLOOD PRESSURE: 140 MMHG

## 2018-11-13 LAB
INR PPP: 1.9
PROTHROMBIN TIME: 21.6 SEC (ref 11.5–14.5)

## 2018-11-13 PROCEDURE — 3331090001 HH PPS REVENUE CREDIT

## 2018-11-13 PROCEDURE — G0299 HHS/HOSPICE OF RN EA 15 MIN: HCPCS

## 2018-11-13 PROCEDURE — 3331090002 HH PPS REVENUE DEBIT

## 2018-11-13 PROCEDURE — 85610 PROTHROMBIN TIME: CPT

## 2018-11-14 ENCOUNTER — APPOINTMENT (OUTPATIENT)
Dept: GENERAL RADIOLOGY | Age: 68
DRG: 291 | End: 2018-11-14
Attending: EMERGENCY MEDICINE
Payer: MEDICARE

## 2018-11-14 ENCOUNTER — HOME CARE VISIT (OUTPATIENT)
Dept: HOME HEALTH SERVICES | Facility: HOME HEALTH | Age: 68
End: 2018-11-14
Payer: MEDICARE

## 2018-11-14 ENCOUNTER — HOSPITAL ENCOUNTER (INPATIENT)
Age: 68
LOS: 6 days | Discharge: HOME HEALTH | DRG: 291 | End: 2018-11-20
Attending: EMERGENCY MEDICINE | Admitting: HOSPITALIST
Payer: MEDICARE

## 2018-11-14 ENCOUNTER — HOME CARE VISIT (OUTPATIENT)
Dept: SCHEDULING | Facility: HOME HEALTH | Age: 68
End: 2018-11-14
Payer: MEDICARE

## 2018-11-14 DIAGNOSIS — R06.03 RESPIRATORY DISTRESS: Primary | ICD-10-CM

## 2018-11-14 DIAGNOSIS — R09.02 HYPOXIA: ICD-10-CM

## 2018-11-14 DIAGNOSIS — I50.9 ACUTE ON CHRONIC CONGESTIVE HEART FAILURE, UNSPECIFIED HEART FAILURE TYPE (HCC): ICD-10-CM

## 2018-11-14 PROBLEM — R06.02 SOB (SHORTNESS OF BREATH): Status: ACTIVE | Noted: 2018-11-14

## 2018-11-14 LAB
ALBUMIN SERPL-MCNC: 3.7 G/DL (ref 3.2–4.6)
ALBUMIN/GLOB SERPL: 0.9 {RATIO} (ref 1.2–3.5)
ALP SERPL-CCNC: 55 U/L (ref 50–136)
ALT SERPL-CCNC: 24 U/L (ref 12–65)
ANION GAP SERPL CALC-SCNC: 5 MMOL/L (ref 7–16)
ARTERIAL PATENCY WRIST A: YES
AST SERPL-CCNC: 29 U/L (ref 15–37)
ATRIAL RATE: 77 BPM
BASE EXCESS BLD CALC-SCNC: 2 MMOL/L
BASOPHILS # BLD: 0.1 K/UL (ref 0–0.2)
BASOPHILS NFR BLD: 1 % (ref 0–2)
BDY SITE: ABNORMAL
BILIRUB SERPL-MCNC: 0.9 MG/DL (ref 0.2–1.1)
BNP SERPL-MCNC: 931 PG/ML
BODY TEMPERATURE: 98.6
BUN SERPL-MCNC: 26 MG/DL (ref 8–23)
CALCIUM SERPL-MCNC: 9.4 MG/DL (ref 8.3–10.4)
CALCULATED P AXIS, ECG09: 75 DEGREES
CALCULATED R AXIS, ECG10: -57 DEGREES
CALCULATED T AXIS, ECG11: 103 DEGREES
CHLORIDE SERPL-SCNC: 104 MMOL/L (ref 98–107)
CO2 BLD-SCNC: 31 MMOL/L
CO2 SERPL-SCNC: 32 MMOL/L (ref 21–32)
CREAT SERPL-MCNC: 1.37 MG/DL (ref 0.6–1)
DIAGNOSIS, 93000: NORMAL
DIFFERENTIAL METHOD BLD: ABNORMAL
EOSINOPHIL # BLD: 0.1 K/UL (ref 0–0.8)
EOSINOPHIL NFR BLD: 1 % (ref 0.5–7.8)
ERYTHROCYTE [DISTWIDTH] IN BLOOD BY AUTOMATED COUNT: 15.6 %
FLOW RATE ISTAT,IFRATE: 5 L/MIN
FLUAV AG NPH QL IA: NEGATIVE
FLUBV AG NPH QL IA: NEGATIVE
GAS FLOW.O2 O2 DELIVERY SYS: ABNORMAL L/MIN
GLOBULIN SER CALC-MCNC: 4.2 G/DL (ref 2.3–3.5)
GLUCOSE BLD STRIP.AUTO-MCNC: 245 MG/DL (ref 65–100)
GLUCOSE SERPL-MCNC: 120 MG/DL (ref 65–100)
HCO3 BLD-SCNC: 29.3 MMOL/L (ref 22–26)
HCT VFR BLD AUTO: 30.9 % (ref 35.8–46.3)
HGB BLD-MCNC: 9.4 G/DL (ref 11.7–15.4)
IMM GRANULOCYTES # BLD: 0 K/UL (ref 0–0.5)
IMM GRANULOCYTES NFR BLD AUTO: 0 % (ref 0–5)
INR PPP: 2.1
LACTATE BLD-SCNC: 0.88 MMOL/L (ref 0.5–1.9)
LYMPHOCYTES # BLD: 2.3 K/UL (ref 0.5–4.6)
LYMPHOCYTES NFR BLD: 24 % (ref 13–44)
MCH RBC QN AUTO: 28.2 PG (ref 26.1–32.9)
MCHC RBC AUTO-ENTMCNC: 30.4 G/DL (ref 31.4–35)
MCV RBC AUTO: 92.8 FL (ref 79.6–97.8)
MONOCYTES # BLD: 0.6 K/UL (ref 0.1–1.3)
MONOCYTES NFR BLD: 6 % (ref 4–12)
NEUTS SEG # BLD: 6.6 K/UL (ref 1.7–8.2)
NEUTS SEG NFR BLD: 69 % (ref 43–78)
NRBC # BLD: 0 K/UL (ref 0–0.2)
P-R INTERVAL, ECG05: 152 MS
PCO2 BLD: 57.8 MMHG (ref 35–45)
PH BLD: 7.31 [PH] (ref 7.35–7.45)
PLATELET # BLD AUTO: 224 K/UL (ref 150–450)
PMV BLD AUTO: 11.7 FL (ref 9.4–12.3)
PO2 BLD: 68 MMHG (ref 75–100)
POTASSIUM SERPL-SCNC: 4.7 MMOL/L (ref 3.5–5.1)
PROCALCITONIN SERPL-MCNC: <0.1 NG/ML
PROT SERPL-MCNC: 7.9 G/DL (ref 6.3–8.2)
PROTHROMBIN TIME: 22.7 SEC (ref 11.5–14.5)
Q-T INTERVAL, ECG07: 388 MS
QRS DURATION, ECG06: 138 MS
QTC CALCULATION (BEZET), ECG08: 439 MS
RBC # BLD AUTO: 3.33 M/UL (ref 4.05–5.2)
SAO2 % BLD: 91 % (ref 95–98)
SERVICE CMNT-IMP: ABNORMAL
SODIUM SERPL-SCNC: 141 MMOL/L (ref 136–145)
SPECIMEN SOURCE: NORMAL
SPECIMEN TYPE: ABNORMAL
TROPONIN I BLD-MCNC: 0.01 NG/ML (ref 0.02–0.05)
VENTRICULAR RATE, ECG03: 77 BPM
WBC # BLD AUTO: 9.7 K/UL (ref 4.3–11.1)

## 2018-11-14 PROCEDURE — 80053 COMPREHEN METABOLIC PANEL: CPT

## 2018-11-14 PROCEDURE — 77030021668 HC NEB PREFIL KT VYRM -A

## 2018-11-14 PROCEDURE — 94640 AIRWAY INHALATION TREATMENT: CPT

## 2018-11-14 PROCEDURE — 82962 GLUCOSE BLOOD TEST: CPT

## 2018-11-14 PROCEDURE — 85610 PROTHROMBIN TIME: CPT

## 2018-11-14 PROCEDURE — 77030034849

## 2018-11-14 PROCEDURE — 84484 ASSAY OF TROPONIN QUANT: CPT

## 2018-11-14 PROCEDURE — 85025 COMPLETE CBC W/AUTO DIFF WBC: CPT

## 2018-11-14 PROCEDURE — 86580 TB INTRADERMAL TEST: CPT | Performed by: NURSE PRACTITIONER

## 2018-11-14 PROCEDURE — 74011000302 HC RX REV CODE- 302: Performed by: NURSE PRACTITIONER

## 2018-11-14 PROCEDURE — 74011000250 HC RX REV CODE- 250: Performed by: EMERGENCY MEDICINE

## 2018-11-14 PROCEDURE — 83605 ASSAY OF LACTIC ACID: CPT

## 2018-11-14 PROCEDURE — 87804 INFLUENZA ASSAY W/OPTIC: CPT

## 2018-11-14 PROCEDURE — 36600 WITHDRAWAL OF ARTERIAL BLOOD: CPT

## 2018-11-14 PROCEDURE — 71045 X-RAY EXAM CHEST 1 VIEW: CPT

## 2018-11-14 PROCEDURE — 94760 N-INVAS EAR/PLS OXIMETRY 1: CPT

## 2018-11-14 PROCEDURE — 99218 HC RM OBSERVATION: CPT

## 2018-11-14 PROCEDURE — 99285 EMERGENCY DEPT VISIT HI MDM: CPT | Performed by: EMERGENCY MEDICINE

## 2018-11-14 PROCEDURE — 87040 BLOOD CULTURE FOR BACTERIA: CPT

## 2018-11-14 PROCEDURE — 83880 ASSAY OF NATRIURETIC PEPTIDE: CPT

## 2018-11-14 PROCEDURE — 3331090002 HH PPS REVENUE DEBIT

## 2018-11-14 PROCEDURE — 96374 THER/PROPH/DIAG INJ IV PUSH: CPT | Performed by: EMERGENCY MEDICINE

## 2018-11-14 PROCEDURE — 74011250637 HC RX REV CODE- 250/637: Performed by: EMERGENCY MEDICINE

## 2018-11-14 PROCEDURE — 3331090001 HH PPS REVENUE CREDIT

## 2018-11-14 PROCEDURE — 93005 ELECTROCARDIOGRAM TRACING: CPT | Performed by: EMERGENCY MEDICINE

## 2018-11-14 PROCEDURE — 82803 BLOOD GASES ANY COMBINATION: CPT

## 2018-11-14 PROCEDURE — 74011250636 HC RX REV CODE- 250/636: Performed by: NURSE PRACTITIONER

## 2018-11-14 PROCEDURE — 84145 PROCALCITONIN (PCT): CPT

## 2018-11-14 PROCEDURE — 77010033678 HC OXYGEN DAILY

## 2018-11-14 PROCEDURE — 74011250637 HC RX REV CODE- 250/637: Performed by: HOSPITALIST

## 2018-11-14 PROCEDURE — 96361 HYDRATE IV INFUSION ADD-ON: CPT | Performed by: EMERGENCY MEDICINE

## 2018-11-14 PROCEDURE — 74011250636 HC RX REV CODE- 250/636: Performed by: EMERGENCY MEDICINE

## 2018-11-14 PROCEDURE — 74011000250 HC RX REV CODE- 250: Performed by: HOSPITALIST

## 2018-11-14 PROCEDURE — 74011250637 HC RX REV CODE- 250/637: Performed by: NURSE PRACTITIONER

## 2018-11-14 PROCEDURE — 65660000000 HC RM CCU STEPDOWN

## 2018-11-14 PROCEDURE — 87205 SMEAR GRAM STAIN: CPT

## 2018-11-14 RX ORDER — WARFARIN 2.5 MG/1
2.5 TABLET ORAL EVERY EVENING
Status: DISCONTINUED | OUTPATIENT
Start: 2018-11-14 | End: 2018-11-17

## 2018-11-14 RX ORDER — SIMVASTATIN 20 MG/1
20 TABLET, FILM COATED ORAL
Status: DISCONTINUED | OUTPATIENT
Start: 2018-11-14 | End: 2018-11-20 | Stop reason: HOSPADM

## 2018-11-14 RX ORDER — LORATADINE 10 MG/1
10 TABLET ORAL DAILY
Status: DISCONTINUED | OUTPATIENT
Start: 2018-11-15 | End: 2018-11-20 | Stop reason: HOSPADM

## 2018-11-14 RX ORDER — HYDRALAZINE HYDROCHLORIDE 20 MG/ML
10 INJECTION INTRAMUSCULAR; INTRAVENOUS
Status: DISCONTINUED | OUTPATIENT
Start: 2018-11-14 | End: 2018-11-20 | Stop reason: HOSPADM

## 2018-11-14 RX ORDER — FUROSEMIDE 10 MG/ML
60 INJECTION INTRAMUSCULAR; INTRAVENOUS EVERY 8 HOURS
Status: DISCONTINUED | OUTPATIENT
Start: 2018-11-14 | End: 2018-11-16

## 2018-11-14 RX ORDER — IPRATROPIUM BROMIDE AND ALBUTEROL SULFATE 2.5; .5 MG/3ML; MG/3ML
3 SOLUTION RESPIRATORY (INHALATION)
Status: DISCONTINUED | OUTPATIENT
Start: 2018-11-14 | End: 2018-11-20 | Stop reason: HOSPADM

## 2018-11-14 RX ORDER — ALBUTEROL SULFATE 2.5 MG/.5ML
10 SOLUTION RESPIRATORY (INHALATION)
Status: COMPLETED | OUTPATIENT
Start: 2018-11-14 | End: 2018-11-14

## 2018-11-14 RX ORDER — ISOSORBIDE MONONITRATE 30 MG/1
30 TABLET, EXTENDED RELEASE ORAL DAILY
Status: DISCONTINUED | OUTPATIENT
Start: 2018-11-15 | End: 2018-11-20 | Stop reason: HOSPADM

## 2018-11-14 RX ORDER — ONDANSETRON 2 MG/ML
4 INJECTION INTRAMUSCULAR; INTRAVENOUS
Status: DISCONTINUED | OUTPATIENT
Start: 2018-11-14 | End: 2018-11-20 | Stop reason: HOSPADM

## 2018-11-14 RX ORDER — FLUTICASONE PROPIONATE 50 MCG
2 SPRAY, SUSPENSION (ML) NASAL DAILY
Status: DISCONTINUED | OUTPATIENT
Start: 2018-11-15 | End: 2018-11-15

## 2018-11-14 RX ORDER — ACETAMINOPHEN 325 MG/1
650 TABLET ORAL
Status: DISCONTINUED | OUTPATIENT
Start: 2018-11-14 | End: 2018-11-20 | Stop reason: HOSPADM

## 2018-11-14 RX ORDER — CARVEDILOL 6.25 MG/1
6.25 TABLET ORAL 2 TIMES DAILY WITH MEALS
Status: DISCONTINUED | OUTPATIENT
Start: 2018-11-14 | End: 2018-11-15

## 2018-11-14 RX ORDER — ESCITALOPRAM OXALATE 10 MG/1
20 TABLET ORAL DAILY
Status: DISCONTINUED | OUTPATIENT
Start: 2018-11-15 | End: 2018-11-20 | Stop reason: HOSPADM

## 2018-11-14 RX ORDER — IPRATROPIUM BROMIDE AND ALBUTEROL SULFATE 2.5; .5 MG/3ML; MG/3ML
3 SOLUTION RESPIRATORY (INHALATION)
Status: COMPLETED | OUTPATIENT
Start: 2018-11-14 | End: 2018-11-14

## 2018-11-14 RX ORDER — BUDESONIDE 0.5 MG/2ML
500 INHALANT ORAL
Status: DISCONTINUED | OUTPATIENT
Start: 2018-11-14 | End: 2018-11-20 | Stop reason: HOSPADM

## 2018-11-14 RX ORDER — FUROSEMIDE 10 MG/ML
60 INJECTION INTRAMUSCULAR; INTRAVENOUS
Status: COMPLETED | OUTPATIENT
Start: 2018-11-14 | End: 2018-11-14

## 2018-11-14 RX ADMIN — TUBERCULIN PURIFIED PROTEIN DERIVATIVE 5 UNITS: 5 INJECTION, SOLUTION INTRADERMAL at 18:46

## 2018-11-14 RX ADMIN — NITROGLYCERIN 1 INCH: 20 OINTMENT TOPICAL at 16:28

## 2018-11-14 RX ADMIN — ALBUTEROL SULFATE 10 MG: 2.5 SOLUTION RESPIRATORY (INHALATION) at 13:35

## 2018-11-14 RX ADMIN — FUROSEMIDE 60 MG: 10 INJECTION, SOLUTION INTRAMUSCULAR; INTRAVENOUS at 16:29

## 2018-11-14 RX ADMIN — FUROSEMIDE 60 MG: 10 INJECTION, SOLUTION INTRAMUSCULAR; INTRAVENOUS at 21:42

## 2018-11-14 RX ADMIN — SODIUM CHLORIDE 500 ML: 900 INJECTION, SOLUTION INTRAVENOUS at 11:37

## 2018-11-14 RX ADMIN — BUDESONIDE 500 MCG: 0.5 INHALANT RESPIRATORY (INHALATION) at 19:23

## 2018-11-14 RX ADMIN — CARVEDILOL 6.25 MG: 6.25 TABLET, FILM COATED ORAL at 18:31

## 2018-11-14 RX ADMIN — METHYLPREDNISOLONE SODIUM SUCCINATE 125 MG: 125 INJECTION, POWDER, FOR SOLUTION INTRAMUSCULAR; INTRAVENOUS at 13:00

## 2018-11-14 RX ADMIN — WARFARIN SODIUM 2.5 MG: 2.5 TABLET ORAL at 18:31

## 2018-11-14 RX ADMIN — IPRATROPIUM BROMIDE AND ALBUTEROL SULFATE 3 ML: .5; 3 SOLUTION RESPIRATORY (INHALATION) at 11:57

## 2018-11-14 RX ADMIN — SIMVASTATIN 20 MG: 20 TABLET, FILM COATED ORAL at 21:41

## 2018-11-14 RX ADMIN — IPRATROPIUM BROMIDE AND ALBUTEROL SULFATE 3 ML: .5; 3 SOLUTION RESPIRATORY (INHALATION) at 19:23

## 2018-11-14 RX ADMIN — ACETAMINOPHEN 650 MG: 325 TABLET, FILM COATED ORAL at 21:41

## 2018-11-14 NOTE — H&P
History & Physcial  
NAME:  [address] [place] [address] Age:  77 y.o. 
:   1950 MRN:   [id number] PCP: Robi Jackson MD 
Treatment Team: Attending Provider: David Golden MD; Primary Nurse: Adelia Chavez RN 
HPI:  
Ms. Zev Lew is a 76 yo female with PMH of CAD, cardiomyopathy, CKD stage 3, COPD on home trilogy at night, 3 L O2 daytime, ICD, Myelodysplastic syndrome, iron deficiency anemia, HTN, hyperlipidemia, pulmonary HTN, systolic CHF EF 16-08% 8316, mechanical aortic valve who presented with c/o 3-4 day hx of increasingly worsening SOB, worse with exertion. She denies fever, chills, recent weight gain, edema, CP, sick contacts, cough. CXR showed no acute findings. . Lactic acid, PCT normal range. Influenza neg. Pt received lasix IV and steroids in the ED. ABG 7.313, CO2  57, O2  68, HCO3 29 Results summary of Diagnostic Studies/Procedures copied from within [facility] EMR: 
  
CXR Results  (Last 48 hours)  
          
 18 1054  XR CHEST PORT Final result Impression:  Impression: Enlarged cardiac silhouette with grossly clear lungs. Narrative:  Portable chest:   
   
History: sob. COPD Comparison: 2018 Findings: A single view of the chest was obtained at 10:33 AM. A cardiac  
pacer/AICD device is present. Cardiac silhouette is moderately enlarged, unchanged. The lungs and pleural  
spaces are grossly clear. The pulmonary vascularity is within normal limits. Median sternotomy wires are present. Complete ROS done and is as stated in HPI or otherwise negative Past Medical History:  
Diagnosis Date  Anticoagulated on Coumadin 2013 S/P AVR  CAD (coronary artery disease) 2013 PCI LAD with stent placed  Cardiomyopathy ([facility]  CHF (congestive heart failure) ([facility] [facility] 2017  CKD (chronic kidney disease) stage 3, GFR 30-59 ml/min ([facility]) 7/10/2013  COPD (chronic obstructive pulmonary disease) ([facility] [facility] 2014  Essential hypertension, benign 2013  HLD (hyperlipidemia)  ICD (implantable cardioverter-defibrillator) in place 10/2/2014 Biotronik single-chamber ICD implantation 10/20/14  Iron deficiency anemia due to chronic blood loss 2009  MDS (myelodysplastic syndrome) ([facility] [address] [facility] 2011 Procrit started in 11 Procrit weekly and Iron stores. 12 good response to 3 weekly procrit did not need it last time  3-7-13 Pt doing well. Just wanted a \"check-up. \" Responding to Procrit every three weeks. 13 patient has missed some injections on recently restarted hemoglobin only issue , takes oral iron iron stores each time  REJI (obstructive sleep apnea)-cpap 2014  Osteoarthritis  Osteopenia  Pulmonary hypertension ([facility] 6/15/2016  Quadrantanopia  Skin defect 2018  Visual complaint 2015 Past Surgical History:  
Procedure Laterality Date [address]    HX AORTIC VALVE REPLACEMENT  ,   
 mechanical valve   HX  SECTION    
 HX CORONARY STENT PLACEMENT  May, 2014 STEMI with Stent placement.  HX ENDOSCOPY  2009 EGD [address] CATHETERIZATION    HX PACEMAKER  10/2/2014 Biotronik ICD  HX TUBAL LIGATION    
 IR BX BONE MARROW DIAGNOSTIC  2011 Allergies Allergen Reactions  Sulfa (Sulfonamide Antibiotics) Hives  Aspirin Nausea Only Cannot take uncoated aspirin Social History Tobacco Use  Smoking status: Former Smoker Packs/day: 0.25 Years: 42.00 Pack years: 10.50 Types: Cigarettes Start date: 10/1/1956 Last attempt to quit: 2014 Years since quittin.5  Smokeless tobacco: Never Used Substance Use Topics  Alcohol use: No  
  Alcohol/week: 0.0 oz Family History Problem Relation Age of Onset  Heart Disease Mother CHF  
 Hypertension Mother  Kidney Disease Mother  Heart Disease Father CHF  Lung Disease Father  Diabetes Father  Cancer Father   
     prostate  Hypertension Father  Heart Attack Father  Heart Disease Maternal Aunt  Diabetes Brother  Coronary Artery Disease Other  Breast Cancer Neg Hx Objective:  
 
Visit Vitals /76 Pulse 80 Temp 98.6 °F (37 °C) Resp 20 Ht 5' 2\" (1.575 m) Wt 97.1 kg (214 lb) SpO2 93% BMI 39.14 kg/m² Temp (24hrs), Av.6 °F (37 °C), Min:98.6 °F (37 °C), Max:98.6 °F (37 °C) Oxygen Therapy O2 Sat (%): 93 % (18 1502) Pulse via Oximetry: 85 beats per minute (18 1502) O2 Device: Nasal cannula (18 1341) O2 Flow Rate (L/min): 4.5 l/min (18 1341) Physical Exam: 
General:    Alert, cooperative, no distress, appears stated age. Unable to speak in full sentences. Head:   Normocephalic, without obvious abnormality, atraumatic. Neck:  +JVD Nose:  Nares normal. No drainage or sinus tenderness. Lungs:   Diminished bilaterally. Crackles bases bilaterally. Heart:  S1S2 present without murmurs rubs gallops. RRR. Trace LE edema. Abdomen:   Soft, non-tender. Not distended. Bowel sounds normal. No masses Extremities: No cyanosis. Moves ext spontaneously. Skin:     Texture, turgor normal. No rashes or lesions. Not Jaundiced. Wound vac left tib/fib area. Neurologic: Alert and oriented X4. No focal deficits. Data Review:  
Recent Results (from the past 24 hour(s)) EKG, 12 LEAD, INITIAL Collection Time: 18 10:18 AM  
Result Value Ref Range Ventricular Rate 77 BPM  
 Atrial Rate 77 BPM  
 P-R Interval 152 ms QRS Duration 138 ms Q-T Interval 388 ms QTC Calculation (Bezet) 439 ms Calculated P Axis 75 degrees Calculated R Axis -57 degrees Calculated T Axis 103 degrees  Diagnosis    
  !! AGE AND GENDER SPECIFIC ECG ANALYSIS !! 
 Normal sinus rhythm Left axis deviation Non-specific intra-ventricular conduction block Incomplete left bundle branch 
 block Cannot rule out Anterior infarct (cited on or before 29-JAN-2000) T wave abnormality, consider lateral ischemia Abnormal ECG When compared with ECG of 07-SEP-2018 18:10, 
Questionable change in initial forces of Lateral leads T wave inversion more evident in Lateral leads Confirmed by CEMARGARET MILLER (), Marly Nuñez ([id number]) on 11/14/2018 12:22:20 PM 
  
CBC WITH AUTOMATED DIFF Collection Time: 11/14/18 10:28 AM  
Result Value Ref Range WBC 9.7 4.3 - 11.1 K/uL  
 RBC 3.33 (L) 4.05 - 5.2 M/uL HGB 9.4 (L) 11.7 - 15.4 g/dL HCT 30.9 (L) 35.8 - 46.3 % MCV 92.8 79.6 - 97.8 FL  
 MCH 28.2 26.1 - 32.9 PG  
 MCHC 30.4 (L) 31.4 - 35.0 g/dL  
 RDW 15.6 % PLATELET 714 123 - 151 K/uL MPV 11.7 9.4 - 12.3 FL ABSOLUTE NRBC 0.00 0.0 - 0.2 K/uL  
 DF AUTOMATED NEUTROPHILS 69 43 - 78 % LYMPHOCYTES 24 13 - 44 % MONOCYTES 6 4.0 - 12.0 % EOSINOPHILS 1 0.5 - 7.8 % BASOPHILS 1 0.0 - 2.0 % IMMATURE GRANULOCYTES 0 0.0 - 5.0 %  
 ABS. NEUTROPHILS 6.6 1.7 - 8.2 K/UL  
 ABS. LYMPHOCYTES 2.3 0.5 - 4.6 K/UL  
 ABS. MONOCYTES 0.6 0.1 - 1.3 K/UL  
 ABS. EOSINOPHILS 0.1 0.0 - 0.8 K/UL  
 ABS. BASOPHILS 0.1 0.0 - 0.2 K/UL  
 ABS. IMM. GRANS. 0.0 0.0 - 0.5 K/UL METABOLIC PANEL, COMPREHENSIVE Collection Time: 11/14/18 10:28 AM  
Result Value Ref Range Sodium 141 136 - 145 mmol/L Potassium 4.7 3.5 - 5.1 mmol/L Chloride 104 98 - 107 mmol/L  
 CO2 32 21 - 32 mmol/L Anion gap 5 (L) 7 - 16 mmol/L Glucose 120 (H) 65 - 100 mg/dL BUN 26 (H) 8 - 23 MG/DL Creatinine 1.37 (H) 0.6 - 1.0 MG/DL  
 GFR est AA 49 (L) >60 ml/min/1.73m2 GFR est non-AA 41 (L) >60 ml/min/1.73m2 Calcium 9.4 8.3 - 10.4 MG/DL Bilirubin, total 0.9 0.2 - 1.1 MG/DL  
 ALT (SGPT) 24 12 - 65 U/L  
 AST (SGOT) 29 15 - 37 U/L Alk. phosphatase 55 50 - 136 U/L Protein, total 7.9 6.3 - 8.2 g/dL Albumin 3.7 3.2 - 4.6 g/dL Globulin 4.2 (H) 2.3 - 3.5 g/dL A-G Ratio 0.9 (L) 1.2 - 3.5 PROTHROMBIN TIME + INR Collection Time: 11/14/18 10:28 AM  
Result Value Ref Range Prothrombin time 22.7 (H) 11.5 - 14.5 sec INR 2.1 PROCALCITONIN Collection Time: 11/14/18 10:28 AM  
Result Value Ref Range Procalcitonin <0.1 ng/mL POC TROPONIN-I Collection Time: 11/14/18 10:38 AM  
Result Value Ref Range Troponin-I (POC) 0.01 (L) 0.02 - 0.05 ng/ml POC LACTIC ACID Collection Time: 11/14/18 10:40 AM  
Result Value Ref Range Lactic Acid (POC) 0.88 0.5 - 1.9 mmol/L  
BNP Collection Time: 11/14/18 10:48 AM  
Result Value Ref Range  pg/mL INFLUENZA A & B AG (RAPID TEST) Collection Time: 11/14/18 11:38 AM  
Result Value Ref Range Influenza A Ag NEGATIVE  NEG Influenza B Ag NEGATIVE  NEG Source NASOPHARYNGEAL    
POC G3 Collection Time: 11/14/18  2:52 PM  
Result Value Ref Range Device: NASAL CANNULA pH (POC) 7.313 (L) 7.35 - 7.45    
 pCO2 (POC) 57.8 (H) 35 - 45 MMHG  
 pO2 (POC) 68 (L) 75 - 100 MMHG  
 HCO3 (POC) 29.3 (H) 22 - 26 MMOL/L  
 sO2 (POC) 91 (L) 95 - 98 % Base excess (POC) 2 mmol/L Allens test (POC) YES Site LEFT RADIAL Patient temp. 98.6 Specimen type (POC) ARTERIAL Performed by Vj   
 CO2, POC 31 MMOL/L Flow rate (POC) 5.000 L/min Assessment and Plan: Active Hospital Problems Diagnosis Date Noted  SOB (shortness of breath) 11/14/2018  Acute exacerbation of CHF (congestive heart failure) ([facility] 11/14/2018 Admit to medical bed remote tele Acute on chronic hypoxemic and hypercapnic respiratory failure:  Likely secondary to volume overload, CHF exacerbation. COPD, REJI, contributing factors. Lasix 60mg Q8 hours IV. Strict I/Os. Continue home med regimen. Check echo. Bipap at night, pt does not have home Trilogy with her. O2 to keep sats >92%. Acute on chronic systolic CHF exacerbation:  Start lasix 60mg Q8 hours IV. Check echo. Strict I/Os. Prosthetic mechanical heart valve:  Continue coumadin, pharmacy to dose CKD III:  Follow creatinine closely while on diuretics. Anemia of CKD/MDS:  Follow H/H, transfuse if <8 Chronic COPD:  Does not appear to be in acute exacerbation. Will continue home medication regimen. Add scheduled duonebs. Received solu medrol X1 dose in ED. Hold off on further steroids for now as this seems to be more acute CHF rather than COPD exacerbation. REJI:  Bipap at night Left leg wound:  Has wound vac in place prior to admission. Consult wound care for management. Notes, labs, VS, diagnostic testing reviewed Time spent with pt 30 min Case discussed with pt, care team, Dr. Matty Morales Code  Full, discussed with pt  
Surrogate decision maker: daughter Estimated length of stay: >2MN 
DVT prophylaxis:  Coumadin Claudine Uriarte NP

## 2018-11-14 NOTE — PROGRESS NOTES
I have personally seen and examined the patient. I personally obtained the key and critical portions of the history and physical exam. I have discussed the assessment and plan with ACP, Stacy Kline. Full H&P to follow. This is a 66-year-old lady with multiple medical problems including hypertension, COPD, severe systolic CHF with left ventricular ejection fraction of 25-30%, mechanical aortic valve, CKD stage III, obesity came to emergency room with complaints of increasing shortness of breath over the last 2 days. Shortness of breath was severe, worse with minimal exertion, not resolved with rest.  The pain is worse with lying flat, sitting up. No fevers or chills. No cough or wheezing. No abdominal pain or diarrhea or burning urination. General: Conscious, mild resp distress, unable to speak in full sentences. Eyes:  ANMOL, No pallor/icterus HENT:             Oral Mucosa is Moist 
Neck:               Supple, elevated JVP Lungs:  Diminished air entry bilaterally with basal crackles+, No significant wheeze/rhonchi Heart:  S1 S2 regular Abdomen: Soft, Normal bowel sounds, NTND, No guarding/rigidity/rebound tend. Extremities: Pedal edema+ Neurologic:  AAOX3, No acute FND, Motor:  LUE: 5/5, LLE: 5/5, RUE: 5/5, RLE: 5/5 Skin:                No acute rashes Musculoskeletal: No Acute findings. . Left leg wound vac in place. Psych:             Appropriate mood, thought process is normal 
 
A/p: 
1. Acute on chronic hypoxemic and hypercapnic respiratory failure. Likely secondary to volume overload from acute CHF exacerbation. Obesity, sleep apnea, COPD contributing also. 2.  Acute on chronic systolic CHF exacerbation 3. Hypertensive urgency -restart home blood pressure medications, use hydralazine as needed 4. Chronic paroxysmal A. fib, prosthetic mechanical aortic valve replacement, anticoagulation with Coumadin INR seems to be therapeutic with a goal of 2-3. 5.  CKD stage III, monitor creatinine closely as patient will need IV diuretics. 6.  Anemia of chronic disease/MDS, monitor hemoglobin and transfuse as needed. 7.  Chronic COPD does not appear to be in any acute exacerbation. We will keep her on bronchodilators. Received 1 dose of Solu-Medrol in the emergency room. Hold off on any further steroids for now. 8.  Obstructive sleep apnea, keep her on BiPAP at night. 9.  Morbid obesity 10. CAD status post stent,  ischemic cardiomyopathy status post ICD/PPM. 11.  Pulmonary hypertension 12. Left leg wound, on wound VAC, consult wound care. Start the patient on IV Lasix, monitor daily weights, creatinine, electrolytes, urine output. We will place an indwelling Ramirez catheter considering the need for IV diuresis in a acutely ill patient who is mobility is limited secondary to left leg wound VAC and morbid obesity. Lactic acid,  pro calcitonin, WBC count are normal.  Influenza screen is negative. High-risk patient for worsening respiratory failure and cardiac comp occasions. Admit to medical telemetry. She will need more than 2 midnights of hospital stay.

## 2018-11-14 NOTE — PROGRESS NOTES
Warfarin dosing per pharmacist 
 
Susan Narendra Branham is a 77 y.o. female. Indication:  s/p AVR Goal INR:  2-3 Home dose:  5 mg (5 mg x 1) on Fri; 2.5 mg (5 mg x 0.5) all other days Risk factors or significant drug interactions:  -- Other anticoagulants:  -- 
 
Daily Monitoring Date  INR     Warfarin dose HGB              Notes 11/14  2.1  2.5 mg  9.4 Pharmacy consulted to assist in Ms. Corral's coumadin dosing. Pt home dose per last anticoagulation note, is 5 mg on Friday and 2.5 mg every other day. Will start patient on 2.5 mg hs to reflect home dose and continue to monitor. Daily INR ordered. Thank you, Rachell Shelby, PharmD Clinical Pharmacist 
[phone]

## 2018-11-14 NOTE — PROGRESS NOTES
12  F Ramirez placed per order. Pt tolerated well. Approximately 500cc of clear yellow urine in return. Will monitor.

## 2018-11-14 NOTE — ROUTINE PROCESS
TRANSFER - OUT REPORT: 
 
Verbal report given to tanner merrill(name) on [employer]  being transferred to [facility](unit) for Report consisted of patients Situation, Background, Assessment and  
Recommendations(SBAR). Information from the following report(s) SBAR was reviewed with the receiving nurse. Lines:  
Peripheral IV 11/14/18 Left Antecubital (Active) Site Assessment Clean, dry, & intact 11/14/2018  1:00 PM  
Phlebitis Assessment 0 11/14/2018  1:00 PM  
Infiltration Assessment 0 11/14/2018  1:00 PM  
Dressing Status Clean, dry, & intact 11/14/2018  1:00 PM  
Dressing Type Transparent 11/14/2018  1:00 PM  
Hub Color/Line Status Green 11/14/2018  1:00 PM  
Alcohol Cap Used No 11/14/2018  1:00 PM  
  
 
Opportunity for questions and clarification was provided. Patient transported with: 
 [facility]

## 2018-11-14 NOTE — PROGRESS NOTES
TRANSFER - IN REPORT: 
 
Verbal report received from [facility] on [employer]  being received from ER for routine progression of care Report consisted of patients Situation, Background, Assessment and  
Recommendations(SBAR). Information from the following report(s) SBAR, ED Summary and Recent Results was reviewed with the receiving nurse. Opportunity for questions and clarification was provided. Assessment completed upon patients arrival to unit and care assumed.

## 2018-11-14 NOTE — ED PROVIDER NOTES
Patient with a history of COPD atrial fibrillation congestive heart failure along with a pacemaker. He is on Coumadin. Wears Trilogy at night and 3 L nasal cannula during the day. Presents with 3 day history of worsening shortness of breath with this morning having episodes of atrial fibrillation. Has also had some desat episodes. Here for evaluation. The history is provided by the patient. No  was used. Shortness of Breath This is a new problem. The problem occurs continuously. The current episode started more than 2 days ago. The problem has been gradually worsening. Pertinent negatives include no fever, no headaches, no rhinorrhea, no sore throat, no neck pain, no cough, no sputum production, no wheezing, no orthopnea, no chest pain, no vomiting, no abdominal pain, no rash, no leg pain and no leg swelling. She has tried nothing for the symptoms. Associated medical issues include COPD, CAD and heart failure. Past Medical History:  
Diagnosis Date  Anticoagulated on Coumadin 7/9/2013 S/P AVR  CAD (coronary artery disease) 1/20/2013 5/8/14 PCI LAD with stent placed  Cardiomyopathy ([address]  CHF (congestive heart failure) ([address] 2/17/2017  CKD (chronic kidney disease) stage 3, GFR 30-59 ml/min ([facility]) 7/10/2013  COPD (chronic obstructive pulmonary disease) ([address] 4/2/2014  Essential hypertension, benign 1/20/2013  HLD (hyperlipidemia)  ICD (implantable cardioverter-defibrillator) in place 10/2/2014 Biotronik single-chamber ICD implantation 10/20/14  Iron deficiency anemia due to chronic blood loss 7/29/2009  MDS (myelodysplastic syndrome) ([address] 12/17/2011 Procrit started in August, 2011 12/18/11 Procrit weekly and Iron stores. 5-12 12-13-12 good response to 3 weekly procrit did not need it last time  3-7-13 Pt doing well. Just wanted a \"check-up. \" Responding to Procrit every three weeks. 13 patient has missed some injections on recently restarted hemoglobin only issue , takes oral iron iron stores each time  REJI (obstructive sleep apnea)-cpap 2014  Osteoarthritis  Osteopenia  Pulmonary hypertension ([facility] 6/15/2016  Quadrantanopia  Skin defect 2018  Visual complaint 2015 Past Surgical History:  
Procedure Laterality Date [address]    HX AORTIC VALVE REPLACEMENT  ,   
 mechanical valve   HX  SECTION    
 HX CORONARY STENT PLACEMENT  May, 2014 STEMI with Stent placement.  HX ENDOSCOPY  2009 EGD [address] CATHETERIZATION    HX PACEMAKER  10/2/2014 Biotronik ICD  HX TUBAL LIGATION    
 IR BX BONE MARROW DIAGNOSTIC  2011 Family History:  
Problem Relation Age of Onset  Heart Disease Mother CHF  Hypertension Mother  Kidney Disease Mother  Heart Disease Father CHF  Lung Disease Father  Diabetes Father  Cancer Father   
     prostate  Hypertension Father  Heart Attack Father  Heart Disease Maternal Aunt  Diabetes Brother  Coronary Artery Disease Other  Breast Cancer Neg Hx Social History Socioeconomic History  Marital status:  Spouse name: Not on file  Number of children: Not on file  Years of education: Not on file  Highest education level: Not on file Social Needs  Financial resource strain: Not on file  Food insecurity - worry: Not on file  Food insecurity - inability: Not on file  Transportation needs - medical: Not on file  Transportation needs - non-medical: Not on file Occupational History Employer: RETIRED Comment: [employer] Tobacco Use  Smoking status: Former Smoker Packs/day: 0.25 Years: 42.00 Pack years: 10.50 Types: Cigarettes Start date: 10/1/1956 Last attempt to quit: 2014 Years since quittin.5  Smokeless tobacco: Never Used Substance and Sexual Activity  Alcohol use: No  
  Alcohol/week: 0.0 oz  Drug use: No  
 Sexual activity: Not on file Other Topics Concern [address] Service Not Asked  Blood Transfusions Not Asked  Caffeine Concern Not Asked  Occupational Exposure Not Asked Tito Ayala Hazards Not Asked  Sleep Concern Not Asked  Stress Concern Not Asked  Weight Concern Yes  Special Diet Yes  Back Care Not Asked  Exercise Not Asked  Bike Helmet Not Asked  Seat Belt Not Asked  Self-Exams Not Asked Social History Narrative Lives with her daughter. Ambulates only short distances. ALLERGIES: Sulfa (sulfonamide antibiotics) and Aspirin Review of Systems Constitutional: Negative for chills and fever. HENT: Negative for rhinorrhea and sore throat. Eyes: Negative for pain and redness. Respiratory: Positive for shortness of breath. Negative for cough, sputum production, chest tightness and wheezing. Cardiovascular: Negative for chest pain, orthopnea and leg swelling. Gastrointestinal: Negative for abdominal pain, diarrhea, nausea and vomiting. Genitourinary: Negative for dysuria and hematuria. Musculoskeletal: Negative for back pain, gait problem, neck pain and neck stiffness. Skin: Negative for color change and rash. Neurological: Negative for weakness, numbness and headaches. Vitals:  
 18 1015 BP: 158/70 Pulse: 79 Resp: 18 Temp: 98.6 °F (37 °C) SpO2: 92% Weight: 97.1 kg (214 lb) Height: 5' 2\" (1.575 m) Physical Exam  
Constitutional: She is oriented to person, place, and time. She appears well-developed and well-nourished. HENT:  
Head: Normocephalic and atraumatic. Neck: Normal range of motion. Neck supple. Cardiovascular: Normal rate and regular rhythm. Pulmonary/Chest: She is in respiratory distress (mild increased effort). She has no wheezes. Mild coarse bilaterally. Abdominal: Soft. Bowel sounds are normal. There is no tenderness. Musculoskeletal: Normal range of motion. She exhibits no edema. LLE below knee with wound vac in place. Neurological: She is alert and oriented to person, place, and time. Skin: Skin is warm and dry. MDM Number of Diagnoses or Management Options Diagnosis management comments: Patient with COPD and CHF. Has worsening shortness of breath for the past 3 days much worse this morning. Had an episode of severe hypoxia home into the upper 70s requiring increased oxygenation at home. BNP is elevated  Around 930. Will admit for diuresis and further care. Amount and/or Complexity of Data Reviewed Clinical lab tests: ordered and reviewed Tests in the radiology section of CPT®: ordered and reviewed Tests in the medicine section of CPT®: ordered and reviewed Patient Progress Patient progress: stable Procedures EKG: normal sinus rhythm, nonspecific ST and T waves changes. Rate 77. XR CHEST PORT (Final result) Result time 11/14/18 11:06:07 Final result by Latesha Link DO (11/14/18 11:06:07) Impression:  
 Impression: Enlarged cardiac silhouette with grossly clear lungs. Narrative:  
 Portable chest:  
 
History: sob.  COPD Comparison: 09/07/2018 Findings: A single view of the chest was obtained at 10:33 AM. A cardiac 
pacer/AICD device is present. Cardiac silhouette is moderately enlarged, unchanged. The lungs and pleural 
spaces are grossly clear. The pulmonary vascularity is within normal limits. Median sternotomy wires are present.  
  
  
  
   
 
 
Results Include: 
 
Recent Results (from the past 24 hour(s)) EKG, 12 LEAD, INITIAL Collection Time: 11/14/18 10:18 AM  
Result Value Ref Range Ventricular Rate 77 BPM  
 Atrial Rate 77 BPM  
 P-R Interval 152 ms QRS Duration 138 ms Q-T Interval 388 ms QTC Calculation (Bezet) 439 ms Calculated P Axis 75 degrees Calculated R Axis -57 degrees Calculated T Axis 103 degrees Diagnosis    
  !! AGE AND GENDER SPECIFIC ECG ANALYSIS !! Normal sinus rhythm Left axis deviation Non-specific intra-ventricular conduction block Incomplete left bundle branch 
 block Cannot rule out Anterior infarct (cited on or before 29-JAN-2000) T wave abnormality, consider lateral ischemia Abnormal ECG When compared with ECG of 07-SEP-2018 18:10, 
Questionable change in initial forces of Lateral leads T wave inversion more evident in Lateral leads Confirmed by CHERELLE MILLER (), Cheri Torres ([id number]) on 11/14/2018 12:22:20 PM 
  
CBC WITH AUTOMATED DIFF Collection Time: 11/14/18 10:28 AM  
Result Value Ref Range WBC 9.7 4.3 - 11.1 K/uL  
 RBC 3.33 (L) 4.05 - 5.2 M/uL HGB 9.4 (L) 11.7 - 15.4 g/dL HCT 30.9 (L) 35.8 - 46.3 % MCV 92.8 79.6 - 97.8 FL  
 MCH 28.2 26.1 - 32.9 PG  
 MCHC 30.4 (L) 31.4 - 35.0 g/dL  
 RDW 15.6 % PLATELET 926 065 - 145 K/uL MPV 11.7 9.4 - 12.3 FL ABSOLUTE NRBC 0.00 0.0 - 0.2 K/uL  
 DF AUTOMATED NEUTROPHILS 69 43 - 78 % LYMPHOCYTES 24 13 - 44 % MONOCYTES 6 4.0 - 12.0 % EOSINOPHILS 1 0.5 - 7.8 % BASOPHILS 1 0.0 - 2.0 % IMMATURE GRANULOCYTES 0 0.0 - 5.0 %  
 ABS. NEUTROPHILS 6.6 1.7 - 8.2 K/UL  
 ABS. LYMPHOCYTES 2.3 0.5 - 4.6 K/UL  
 ABS. MONOCYTES 0.6 0.1 - 1.3 K/UL  
 ABS. EOSINOPHILS 0.1 0.0 - 0.8 K/UL  
 ABS. BASOPHILS 0.1 0.0 - 0.2 K/UL  
 ABS. IMM. GRANS. 0.0 0.0 - 0.5 K/UL METABOLIC PANEL, COMPREHENSIVE Collection Time: 11/14/18 10:28 AM  
Result Value Ref Range Sodium 141 136 - 145 mmol/L Potassium 4.7 3.5 - 5.1 mmol/L Chloride 104 98 - 107 mmol/L  
 CO2 32 21 - 32 mmol/L Anion gap 5 (L) 7 - 16 mmol/L Glucose 120 (H) 65 - 100 mg/dL BUN 26 (H) 8 - 23 MG/DL Creatinine 1.37 (H) 0.6 - 1.0 MG/DL  
 GFR est AA 49 (L) >60 ml/min/1.73m2 GFR est non-AA 41 (L) >60 ml/min/1.73m2 Calcium 9.4 8.3 - 10.4 MG/DL Bilirubin, total 0.9 0.2 - 1.1 MG/DL  
 ALT (SGPT) 24 12 - 65 U/L  
 AST (SGOT) 29 15 - 37 U/L Alk. phosphatase 55 50 - 136 U/L Protein, total 7.9 6.3 - 8.2 g/dL Albumin 3.7 3.2 - 4.6 g/dL Globulin 4.2 (H) 2.3 - 3.5 g/dL A-G Ratio 0.9 (L) 1.2 - 3.5 PROTHROMBIN TIME + INR Collection Time: 11/14/18 10:28 AM  
Result Value Ref Range Prothrombin time 22.7 (H) 11.5 - 14.5 sec INR 2.1 PROCALCITONIN Collection Time: 11/14/18 10:28 AM  
Result Value Ref Range Procalcitonin <0.1 ng/mL POC TROPONIN-I Collection Time: 11/14/18 10:38 AM  
Result Value Ref Range Troponin-I (POC) 0.01 (L) 0.02 - 0.05 ng/ml POC LACTIC ACID Collection Time: 11/14/18 10:40 AM  
Result Value Ref Range Lactic Acid (POC) 0.88 0.5 - 1.9 mmol/L  
BNP Collection Time: 11/14/18 10:48 AM  
Result Value Ref Range  pg/mL INFLUENZA A & B AG (RAPID TEST) Collection Time: 11/14/18 11:38 AM  
Result Value Ref Range Influenza A Ag NEGATIVE  NEG Influenza B Ag NEGATIVE  NEG Source NASOPHARYNGEAL    
POC G3 Collection Time: 11/14/18  2:52 PM  
Result Value Ref Range Device: NASAL CANNULA pH (POC) 7.313 (L) 7.35 - 7.45    
 pCO2 (POC) 57.8 (H) 35 - 45 MMHG  
 pO2 (POC) 68 (L) 75 - 100 MMHG  
 HCO3 (POC) 29.3 (H) 22 - 26 MMOL/L  
 sO2 (POC) 91 (L) 95 - 98 % Base excess (POC) 2 mmol/L Allens test (POC) YES Site LEFT RADIAL Patient temp. 98.6 Specimen type (POC) ARTERIAL Performed by Vj   
 CO2, POC 31 MMOL/L Flow rate (POC) 5.000 L/min

## 2018-11-14 NOTE — PROGRESS NOTES
Pt arrived to room 825 via stretcher from ER. Pt alert oriented times at this time. Pt denies pain or distress at this time. Pt SOB with exertion. Pt sat 97% on 4 1/2 L NC at this time. Pt has wound vac to left leg. Seal intact, dressing clean, dry intact at this time. Pt has no breakdown to sacrum. Pt skin assessed with Amos Ruiz RN. Pt oriented to room and surroundings. Pt encouraged to call for assistance if needed call light in reach, door open will monitor.

## 2018-11-15 ENCOUNTER — HOSPITAL ENCOUNTER (OUTPATIENT)
Dept: WOUND CARE | Age: 68
End: 2018-11-15
Attending: SURGERY
Payer: MEDICARE

## 2018-11-15 VITALS
DIASTOLIC BLOOD PRESSURE: 80 MMHG | TEMPERATURE: 97.9 F | RESPIRATION RATE: 18 BRPM | HEART RATE: 87 BPM | OXYGEN SATURATION: 94 % | SYSTOLIC BLOOD PRESSURE: 142 MMHG

## 2018-11-15 LAB
ALBUMIN SERPL-MCNC: 3.7 G/DL (ref 3.2–4.6)
ALBUMIN/GLOB SERPL: 0.9 {RATIO} (ref 1.2–3.5)
ALP SERPL-CCNC: 60 U/L (ref 50–136)
ALT SERPL-CCNC: 24 U/L (ref 12–65)
ANION GAP SERPL CALC-SCNC: 6 MMOL/L (ref 7–16)
AST SERPL-CCNC: 18 U/L (ref 15–37)
BASOPHILS # BLD: 0 K/UL (ref 0–0.2)
BASOPHILS NFR BLD: 0 % (ref 0–2)
BILIRUB SERPL-MCNC: 0.6 MG/DL (ref 0.2–1.1)
BNP SERPL-MCNC: 1611 PG/ML
BUN SERPL-MCNC: 27 MG/DL (ref 8–23)
CALCIUM SERPL-MCNC: 9.3 MG/DL (ref 8.3–10.4)
CHLORIDE SERPL-SCNC: 103 MMOL/L (ref 98–107)
CO2 SERPL-SCNC: 32 MMOL/L (ref 21–32)
CREAT SERPL-MCNC: 1.39 MG/DL (ref 0.6–1)
DIFFERENTIAL METHOD BLD: ABNORMAL
EOSINOPHIL # BLD: 0 K/UL (ref 0–0.8)
EOSINOPHIL NFR BLD: 0 % (ref 0.5–7.8)
ERYTHROCYTE [DISTWIDTH] IN BLOOD BY AUTOMATED COUNT: 15.4 %
GLOBULIN SER CALC-MCNC: 4.2 G/DL (ref 2.3–3.5)
GLUCOSE BLD STRIP.AUTO-MCNC: 120 MG/DL (ref 65–100)
GLUCOSE BLD STRIP.AUTO-MCNC: 149 MG/DL (ref 65–100)
GLUCOSE SERPL-MCNC: 169 MG/DL (ref 65–100)
HCT VFR BLD AUTO: 31.5 % (ref 35.8–46.3)
HGB BLD-MCNC: 9.8 G/DL (ref 11.7–15.4)
IMM GRANULOCYTES # BLD: 0 K/UL (ref 0–0.5)
IMM GRANULOCYTES NFR BLD AUTO: 0 % (ref 0–5)
INR PPP: 2.3
LYMPHOCYTES # BLD: 0.8 K/UL (ref 0.5–4.6)
LYMPHOCYTES NFR BLD: 17 % (ref 13–44)
MAGNESIUM SERPL-MCNC: 2 MG/DL (ref 1.8–2.4)
MCH RBC QN AUTO: 28.2 PG (ref 26.1–32.9)
MCHC RBC AUTO-ENTMCNC: 31.1 G/DL (ref 31.4–35)
MCV RBC AUTO: 90.8 FL (ref 79.6–97.8)
MM INDURATION POC: 0 MM (ref 0–5)
MONOCYTES # BLD: 0.1 K/UL (ref 0.1–1.3)
MONOCYTES NFR BLD: 2 % (ref 4–12)
NEUTS SEG # BLD: 3.7 K/UL (ref 1.7–8.2)
NEUTS SEG NFR BLD: 81 % (ref 43–78)
NRBC # BLD: 0 K/UL (ref 0–0.2)
PHOSPHATE SERPL-MCNC: 4 MG/DL (ref 2.3–3.7)
PLATELET # BLD AUTO: 212 K/UL (ref 150–450)
PMV BLD AUTO: 11.3 FL (ref 9.4–12.3)
POTASSIUM SERPL-SCNC: 4.7 MMOL/L (ref 3.5–5.1)
PPD POC: NORMAL NEGATIVE
PROT SERPL-MCNC: 7.9 G/DL (ref 6.3–8.2)
PROTHROMBIN TIME: 23.8 SEC (ref 11.5–14.5)
RBC # BLD AUTO: 3.47 M/UL (ref 4.05–5.2)
SODIUM SERPL-SCNC: 141 MMOL/L (ref 136–145)
WBC # BLD AUTO: 4.5 K/UL (ref 4.3–11.1)

## 2018-11-15 PROCEDURE — 74750000023 HC WOUND THERAPY

## 2018-11-15 PROCEDURE — 74011250637 HC RX REV CODE- 250/637: Performed by: NURSE PRACTITIONER

## 2018-11-15 PROCEDURE — 94640 AIRWAY INHALATION TREATMENT: CPT

## 2018-11-15 PROCEDURE — 85610 PROTHROMBIN TIME: CPT

## 2018-11-15 PROCEDURE — 77030019952 HC CANSTR VAC ASST KCON -B

## 2018-11-15 PROCEDURE — 74011250637 HC RX REV CODE- 250/637: Performed by: HOSPITALIST

## 2018-11-15 PROCEDURE — 83880 ASSAY OF NATRIURETIC PEPTIDE: CPT

## 2018-11-15 PROCEDURE — 77030019934 HC DRSG VAC ASST KCON -B

## 2018-11-15 PROCEDURE — 74011250637 HC RX REV CODE- 250/637: Performed by: EMERGENCY MEDICINE

## 2018-11-15 PROCEDURE — 97605 NEG PRS WND THER DME<=50SQCM: CPT

## 2018-11-15 PROCEDURE — 76450000000

## 2018-11-15 PROCEDURE — 85025 COMPLETE CBC W/AUTO DIFF WBC: CPT

## 2018-11-15 PROCEDURE — 36415 COLL VENOUS BLD VENIPUNCTURE: CPT

## 2018-11-15 PROCEDURE — 3331090002 HH PPS REVENUE DEBIT

## 2018-11-15 PROCEDURE — 82962 GLUCOSE BLOOD TEST: CPT

## 2018-11-15 PROCEDURE — 3331090001 HH PPS REVENUE CREDIT

## 2018-11-15 PROCEDURE — 74011250636 HC RX REV CODE- 250/636: Performed by: NURSE PRACTITIONER

## 2018-11-15 PROCEDURE — 74011000250 HC RX REV CODE- 250: Performed by: HOSPITALIST

## 2018-11-15 PROCEDURE — 80053 COMPREHEN METABOLIC PANEL: CPT

## 2018-11-15 PROCEDURE — 77030027688 HC DRSG MEPILEX 16-48IN NO BORD MOLN -A

## 2018-11-15 PROCEDURE — 74011250636 HC RX REV CODE- 250/636: Performed by: HOSPITALIST

## 2018-11-15 PROCEDURE — 84100 ASSAY OF PHOSPHORUS: CPT

## 2018-11-15 PROCEDURE — C8929 TTE W OR WO FOL WCON,DOPPLER: HCPCS

## 2018-11-15 PROCEDURE — 94760 N-INVAS EAR/PLS OXIMETRY 1: CPT

## 2018-11-15 PROCEDURE — 92610 EVALUATE SWALLOWING FUNCTION: CPT

## 2018-11-15 PROCEDURE — 97110 THERAPEUTIC EXERCISES: CPT

## 2018-11-15 PROCEDURE — 65660000000 HC RM CCU STEPDOWN

## 2018-11-15 PROCEDURE — 97161 PT EVAL LOW COMPLEX 20 MIN: CPT

## 2018-11-15 PROCEDURE — 77010033678 HC OXYGEN DAILY

## 2018-11-15 PROCEDURE — 83735 ASSAY OF MAGNESIUM: CPT

## 2018-11-15 RX ORDER — PANTOPRAZOLE SODIUM 40 MG/1
40 TABLET, DELAYED RELEASE ORAL
Status: DISCONTINUED | OUTPATIENT
Start: 2018-11-15 | End: 2018-11-20 | Stop reason: HOSPADM

## 2018-11-15 RX ORDER — HYDRALAZINE HYDROCHLORIDE 25 MG/1
25 TABLET, FILM COATED ORAL
Status: DISCONTINUED | OUTPATIENT
Start: 2018-11-15 | End: 2018-11-20 | Stop reason: HOSPADM

## 2018-11-15 RX ORDER — DOCUSATE SODIUM 100 MG/1
100 CAPSULE, LIQUID FILLED ORAL DAILY
Status: DISCONTINUED | OUTPATIENT
Start: 2018-11-15 | End: 2018-11-20 | Stop reason: HOSPADM

## 2018-11-15 RX ORDER — NYSTATIN 100000 [USP'U]/G
POWDER TOPICAL 2 TIMES DAILY
Status: DISCONTINUED | OUTPATIENT
Start: 2018-11-15 | End: 2018-11-20 | Stop reason: HOSPADM

## 2018-11-15 RX ORDER — CARVEDILOL 12.5 MG/1
12.5 TABLET ORAL 2 TIMES DAILY WITH MEALS
Status: DISCONTINUED | OUTPATIENT
Start: 2018-11-15 | End: 2018-11-20 | Stop reason: HOSPADM

## 2018-11-15 RX ADMIN — LORATADINE 10 MG: 10 TABLET ORAL at 10:06

## 2018-11-15 RX ADMIN — FLUTICASONE PROPIONATE 2 SPRAY: 50 SPRAY, METERED NASAL at 10:05

## 2018-11-15 RX ADMIN — BUDESONIDE 500 MCG: 0.5 INHALANT RESPIRATORY (INHALATION) at 08:43

## 2018-11-15 RX ADMIN — ESCITALOPRAM OXALATE 20 MG: 10 TABLET ORAL at 10:06

## 2018-11-15 RX ADMIN — WARFARIN SODIUM 2.5 MG: 2.5 TABLET ORAL at 17:02

## 2018-11-15 RX ADMIN — SACUBITRIL AND VALSARTAN 1 TABLET: 49; 51 TABLET, FILM COATED ORAL at 21:52

## 2018-11-15 RX ADMIN — SACUBITRIL AND VALSARTAN 1 TABLET: 49; 51 TABLET, FILM COATED ORAL at 10:31

## 2018-11-15 RX ADMIN — PERFLUTREN 1 ML: 6.52 INJECTION, SUSPENSION INTRAVENOUS at 09:00

## 2018-11-15 RX ADMIN — ISOSORBIDE MONONITRATE 30 MG: 30 TABLET, EXTENDED RELEASE ORAL at 10:05

## 2018-11-15 RX ADMIN — DOCUSATE SODIUM 100 MG: 100 CAPSULE, LIQUID FILLED ORAL at 10:31

## 2018-11-15 RX ADMIN — FUROSEMIDE 60 MG: 10 INJECTION, SOLUTION INTRAMUSCULAR; INTRAVENOUS at 05:10

## 2018-11-15 RX ADMIN — IPRATROPIUM BROMIDE AND ALBUTEROL SULFATE 3 ML: .5; 3 SOLUTION RESPIRATORY (INHALATION) at 08:43

## 2018-11-15 RX ADMIN — SIMVASTATIN 20 MG: 20 TABLET, FILM COATED ORAL at 21:52

## 2018-11-15 RX ADMIN — NYSTATIN: 100000 POWDER TOPICAL at 17:02

## 2018-11-15 RX ADMIN — IPRATROPIUM BROMIDE AND ALBUTEROL SULFATE 3 ML: .5; 3 SOLUTION RESPIRATORY (INHALATION) at 21:04

## 2018-11-15 RX ADMIN — ACETAMINOPHEN 650 MG: 325 TABLET, FILM COATED ORAL at 21:52

## 2018-11-15 RX ADMIN — FUROSEMIDE 60 MG: 10 INJECTION, SOLUTION INTRAMUSCULAR; INTRAVENOUS at 21:52

## 2018-11-15 RX ADMIN — BUDESONIDE 500 MCG: 0.5 INHALANT RESPIRATORY (INHALATION) at 21:04

## 2018-11-15 RX ADMIN — FUROSEMIDE 60 MG: 10 INJECTION, SOLUTION INTRAMUSCULAR; INTRAVENOUS at 14:01

## 2018-11-15 RX ADMIN — CARVEDILOL 6.25 MG: 6.25 TABLET, FILM COATED ORAL at 10:06

## 2018-11-15 RX ADMIN — CARVEDILOL 12.5 MG: 12.5 TABLET, FILM COATED ORAL at 17:02

## 2018-11-15 RX ADMIN — PANTOPRAZOLE SODIUM 40 MG: 40 TABLET, DELAYED RELEASE ORAL at 14:03

## 2018-11-15 RX ADMIN — IPRATROPIUM BROMIDE AND ALBUTEROL SULFATE 3 ML: .5; 3 SOLUTION RESPIRATORY (INHALATION) at 15:19

## 2018-11-15 RX ADMIN — IPRATROPIUM BROMIDE AND ALBUTEROL SULFATE 3 ML: .5; 3 SOLUTION RESPIRATORY (INHALATION) at 11:46

## 2018-11-15 NOTE — PROGRESS NOTES
Pt assisted back to bed after sitting up in chair most of the day. No distress noted at this time. Wound vac remains in place to left knee. Pt encouraged to call for assistance if needed call light in reach, door open will monitor.

## 2018-11-15 NOTE — PROGRESS NOTES
Remote tele reports 7 beat run of V-tach. Patient asymptomatic. Heart rate sinus rhythm 64. Dr. Laurence Stephen notified and will place am labs as stat.

## 2018-11-15 NOTE — ROUTINE PROCESS
CHF teaching started to pt post introduction. Aware of CHF dx. Planner @ BS and needs scale. Emphasis to report worsening Daily WTs and /or dyspnea, will follow: 20mins Cardiac diet/ FR Palliative care: RATT 50, ACP on file entered

## 2018-11-15 NOTE — PROGRESS NOTES
Patient has been placed on home TRILOGY with 4 liters bled into the machine. Patient saturation is 96%. Patient is stable and comfortable at this time. No complications

## 2018-11-15 NOTE — PROGRESS NOTES
Progress Note Patient: Luis Kelly               Sex: female          MRN: [id number] YOB: 1950      Age:  77 y.o. PCP:  Nicole Castorena MD 
Treatment Team: Attending Provider: Kaiden Alejo MD; Consulting Provider: Amaury Ramirez MD; Utilization Review: Diony Ryan Subjective:  
 
68-year-old lady with multiple medical problems including hypertension, COPD, severe systolic CHF with left ventricular ejection fraction of 25-30%, mechanical aortic valve, CKD stage III, obesity came to emergency room with complaints of increasing shortness of breath over the last 2 days. Shortness of breath was severe, worse with minimal exertion, not resolved with rest.  The pain is worse with lying flat, sitting up. No fevers or chills. No cough or wheezing. No abdominal pain or diarrhea or burning urination. Lactic acid,  pro calcitonin, WBC count are normal.  Influenza screen is negative. 
  
11/15/2018: SOB improving. No fevers/cp/abdominal pain. Diuresing well. Objective:  
Physical Exam:  
Visit Vitals /79 (BP Patient Position: At rest) Pulse 62 Temp 97.3 °F (36.3 °C) Resp 20 Ht 5' 2\" (1.575 m) Wt 100.7 kg (221 lb 14.4 oz) SpO2 96% Breastfeeding? No  
BMI 40.59 kg/m² Temp (24hrs), Av.9 °F (36.6 °C), Min:97.3 °F (36.3 °C), Max:98.3 °F (36.8 °C) Oxygen Therapy O2 Sat (%): 96 % (11/15/18 0843) Pulse via Oximetry: 79 beats per minute (11/15/18 0843) O2 Device: Nasal cannula (11/15/18 0843) O2 Flow Rate (L/min): 3 l/min (11/15/18 0843) FIO2 (%): 36 % (18 2240) Intake/Output Summary (Last 24 hours) at 11/15/2018 1102 Last data filed at 11/15/2018 1246 Gross per 24 hour Intake 860 ml Output 2500 ml Net -1640 ml General:          Conscious, no acute distress Eyes:               ANMOL, No pallor/icterus HENT:             Oral Mucosa is Moist 
 Neck:               Supple, elevated JVP Lungs:             Diminished air entry bilaterally with basal crackles+, No significant wheeze/rhonchi Heart:              S1 S2 regular Abdomen:        Soft, Normal bowel sounds, NTND, No guarding/rigidity/rebound tend. Extremities:     Pedal edema+ Neurologic:      AAOX3, No acute FND, Motor:  LUE: 5/5, LLE: 5/5, RUE: 5/5, RLE: 5/5 Skin:                No acute rashes Musculoskeletal: No Acute findings. . Left leg wound vac in place. Psych:             Appropriate mood, thought process is normal 
  
 
LAB Recent Results (from the past 24 hour(s)) INFLUENZA A & B AG (RAPID TEST) Collection Time: 11/14/18 11:38 AM  
Result Value Ref Range Influenza A Ag NEGATIVE  NEG Influenza B Ag NEGATIVE  NEG Source NASOPHARYNGEAL    
CULTURE, BLOOD Collection Time: 11/14/18 11:59 AM  
Result Value Ref Range Special Requests: RIGHT 
ARM Culture result: NO GROWTH AFTER 20 HOURS    
POC G3 Collection Time: 11/14/18  2:52 PM  
Result Value Ref Range Device: NASAL CANNULA pH (POC) 7.313 (L) 7.35 - 7.45    
 pCO2 (POC) 57.8 (H) 35 - 45 MMHG  
 pO2 (POC) 68 (L) 75 - 100 MMHG  
 HCO3 (POC) 29.3 (H) 22 - 26 MMOL/L  
 sO2 (POC) 91 (L) 95 - 98 % Base excess (POC) 2 mmol/L Allens test (POC) YES Site LEFT RADIAL Patient temp. 98.6 Specimen type (POC) ARTERIAL Performed by Vj   
 CO2, POC 31 MMOL/L Flow rate (POC) 5.000 L/min GLUCOSE, POC Collection Time: 11/14/18  8:28 PM  
Result Value Ref Range Glucose (POC) 245 (H) 65 - 100 mg/dL PROTHROMBIN TIME + INR Collection Time: 11/15/18  2:36 AM  
Result Value Ref Range Prothrombin time 23.8 (H) 11.5 - 14.5 sec INR 2.3 METABOLIC PANEL, COMPREHENSIVE Collection Time: 11/15/18  2:36 AM  
Result Value Ref Range Sodium 141 136 - 145 mmol/L Potassium 4.7 3.5 - 5.1 mmol/L  Chloride 103 98 - 107 mmol/L  
 CO2 32 21 - 32 mmol/L  
 Anion gap 6 (L) 7 - 16 mmol/L Glucose 169 (H) 65 - 100 mg/dL BUN 27 (H) 8 - 23 MG/DL Creatinine 1.39 (H) 0.6 - 1.0 MG/DL  
 GFR est AA 48 (L) >60 ml/min/1.73m2 GFR est non-AA 40 (L) >60 ml/min/1.73m2 Calcium 9.3 8.3 - 10.4 MG/DL Bilirubin, total 0.6 0.2 - 1.1 MG/DL  
 ALT (SGPT) 24 12 - 65 U/L  
 AST (SGOT) 18 15 - 37 U/L Alk. phosphatase 60 50 - 136 U/L Protein, total 7.9 6.3 - 8.2 g/dL Albumin 3.7 3.2 - 4.6 g/dL Globulin 4.2 (H) 2.3 - 3.5 g/dL A-G Ratio 0.9 (L) 1.2 - 3.5    
CBC WITH AUTOMATED DIFF Collection Time: 11/15/18  2:36 AM  
Result Value Ref Range WBC 4.5 4.3 - 11.1 K/uL  
 RBC 3.47 (L) 4.05 - 5.2 M/uL HGB 9.8 (L) 11.7 - 15.4 g/dL HCT 31.5 (L) 35.8 - 46.3 % MCV 90.8 79.6 - 97.8 FL  
 MCH 28.2 26.1 - 32.9 PG  
 MCHC 31.1 (L) 31.4 - 35.0 g/dL  
 RDW 15.4 % PLATELET 413 116 - 105 K/uL MPV 11.3 9.4 - 12.3 FL ABSOLUTE NRBC 0.00 0.0 - 0.2 K/uL  
 DF AUTOMATED NEUTROPHILS 81 (H) 43 - 78 % LYMPHOCYTES 17 13 - 44 % MONOCYTES 2 (L) 4.0 - 12.0 % EOSINOPHILS 0 (L) 0.5 - 7.8 % BASOPHILS 0 0.0 - 2.0 % IMMATURE GRANULOCYTES 0 0.0 - 5.0 %  
 ABS. NEUTROPHILS 3.7 1.7 - 8.2 K/UL  
 ABS. LYMPHOCYTES 0.8 0.5 - 4.6 K/UL  
 ABS. MONOCYTES 0.1 0.1 - 1.3 K/UL  
 ABS. EOSINOPHILS 0.0 0.0 - 0.8 K/UL  
 ABS. BASOPHILS 0.0 0.0 - 0.2 K/UL  
 ABS. IMM. GRANS. 0.0 0.0 - 0.5 K/UL BNP Collection Time: 11/15/18  2:36 AM  
Result Value Ref Range BNP 1,611 pg/mL MAGNESIUM Collection Time: 11/15/18  2:36 AM  
Result Value Ref Range Magnesium 2.0 1.8 - 2.4 mg/dL PHOSPHORUS Collection Time: 11/15/18  2:36 AM  
Result Value Ref Range Phosphorus 4.0 (H) 2.3 - 3.7 MG/DL No results found. No results found. All Micro Results Procedure Component Value Units Date/Time CULTURE, BLOOD [[id number]] Collected:  11/14/18 1034 Order Status:  Completed Specimen:  Blood Updated:  11/15/18 1039   Special Requests: --     
  LEFT 
 Antecubital 
  
  Culture result: NO GROWTH AFTER 22 HOURS     
 CULTURE, BLOOD [[id number]] Collected:  11/14/18 1159 Order Status:  Completed Specimen:  Blood Updated:  11/15/18 9856 Special Requests: --     
  RIGHT 
ARM Culture result: NO GROWTH AFTER 20 HOURS INFLUENZA A & B AG (RAPID TEST) [[id number]] Collected:  11/14/18 1138 Order Status:  Completed Specimen:  Nasopharyngeal from Nasal washing Updated:  11/14/18 1158 Influenza A Ag NEGATIVE Comment: NEGATIVE FOR THE PRESENCE OF INFLUENZA A ANTIGEN 
INFECTION DUE TO INFLUENZA A CANNOT BE RULED OUT. BECAUSE THE ANTIGEN PRESENT IN THE SAMPLE MAY BE BELOW 
THE DETECTION LIMIT OF THE TEST. A NEGATIVE TEST IS PRESUMPTIVE AND IT IS RECOMMENDED THAT THESE RESULTS BE CONFIRMED BY VIRAL CULTURE OR AN FDA-CLEARED INFLUENZA A AND B MOLECULAR ASSAY. Influenza B Ag NEGATIVE Comment: NEGATIVE FOR THE PRESENCE OF INFLUENZA B ANTIGEN 
INFECTION DUE TO INFLUENZA B CANNOT BE RULED OUT. BECAUSE THE ANTIGEN PRESENT IN THE SAMPLE MAY BE BELOW 
THE DETECTION LIMIT OF THE TEST. A NEGATIVE TEST IS PRESUMPTIVE AND IT IS RECOMMENDED THAT THESE RESULTS BE CONFIRMED BY VIRAL CULTURE OR AN FDA-CLEARED INFLUENZA A AND B MOLECULAR ASSAY. Source NASOPHARYNGEAL Current Medications Reviewed Assessment/Plan 1. Acute on chronic hypoxemic and hypercapnic respiratory failure. Likely secondary to volume overload from acute CHF exacerbation. Obesity, sleep apnea, COPD contributing also. 
  
2. Acute on chronic systolic CHF exacerbation 3. Hypertensive urgency - ctn current meds. BP improving. use hydralazine as needed 4. Chronic paroxysmal A. fib, prosthetic mechanical aortic valve replacement, anticoagulation with Coumadin INR seems to be therapeutic with a goal of 2-3. 
5.  CKD stage III, monitor creatinine closely as patient is on IV diuretics. 6.  Anemia of chronic disease/MDS, monitor hemoglobin and transfuse as needed. 7.  Chronic COPD does not appear to be in any acute exacerbation. We will keep her on bronchodilators. Received 1 dose of Solu-Medrol in the emergency room. No further steroids for now. 8.  Obstructive sleep apnea, keep her on BiPAP at night. 9.  Morbid obesity 10. CAD status post stent,  ischemic cardiomyopathy status post ICD/PPM. 11.  Pulmonary hypertension 12. Left leg wound, on wound VAC, consulted wound care. 13. Debility - PT/OT consult 14. GERD - Start Protonix. 
  
Ctn IV Lasix, monitor daily weights, creatinine, electrolytes, urine output. Appreciate cardiology recomm. DVT Prophylaxis: Warfarin Risk: High-risk patient for worsening respiratory failure and also cardiac complications. Leandra Velasquez MD 
November 15, 2018

## 2018-11-15 NOTE — PROGRESS NOTES
Care Management Interventions PCP Verified by CM: Yes Transition of Care Consult (CM Consult): Home Health [address]: Yes Physical Therapy Consult: Yes Occupational Therapy Consult: Yes Current Support Network: Lives Alone Confirm Follow Up Transport: Family Plan discussed with Pt/Family/Caregiver: Yes Discharge Location Discharge Placement: Home Patient lives alone in an apartment. Has a trilogy and a wound vac. Patient will discharge home with resumption of [facility] HH. Patient has [address] caregivers that are in the home daily to assist with ADLs. CM following.

## 2018-11-15 NOTE — PROGRESS NOTES
Beside report given from DEWAYNE Baeza and [address] [place] [address]. Patient resting in bed, watching TV. Respirations even and unlabored on 1L NC. No acute distress noted.  Call light within reach. Will continue to monitor.

## 2018-11-15 NOTE — PROGRESS NOTES
Warfarin dosing per pharmacist 
 
Mili Juan Rolando Barker is a 77 y.o. female. Indication:  s/p AVR Goal INR:  2-3 Home dose:  5 mg (5 mg x 1) on Fri; 2.5 mg (5 mg x 0.5) all other days Risk factors or significant drug interactions:  -- Other anticoagulants:  -- 
 
Daily Monitoring Date  INR     Warfarin dose HGB              Notes 11/14  2.1  2.5 mg  9.4 
11/15  2.3  2.5 mg  9.8 Pharmacy consulted to assist in Ms. Corral's coumadin dosing. Pt home dose per last anticoagulation note, is 5 mg on Friday and 2.5 mg every other day. Continue warfarin 2.5 mg qhs to reflect home dose and continue to monitor. Daily INR ordered. Pharmacy will continue to follow. Please call with any questions. Thank you, Calderon Mccurdy, PharmD Clinical Pharmacist 
[phone]

## 2018-11-15 NOTE — PROGRESS NOTES
IP consult to Cardiac Rehab. Echo results pending at this time. I will follow for a qualifying EF result and see pt if appropriate.

## 2018-11-15 NOTE — PROGRESS NOTES
Potential CHF Bundled Payment patient. Patient known to me for COPD. [facility] Patient eligible for the Transitions of Care Wellness program.  This program includes a Health  that could provide in-home visits, phone calls after discharge, and a HelpLine. RRAT- 50 
 
Murali Allen RN- [facility], BSN Care Transition/ Bundled Payment Navigator [phone]

## 2018-11-15 NOTE — PROGRESS NOTES
Pt resting in bed. Pt awakens when spoken to. triology removed and pt placed on 4 l NC at this time. Pt reports breathing is better this am. Pt has wound vac to left knee with seal in place. Pt denies pain or distress at this time. Pt encouraged to call for assistance if needed call light in reach, door open will monitor.

## 2018-11-15 NOTE — PROGRESS NOTES
Speech language pathology: bedside swallow note: Initial Assessment NAME/AGE/GENDER: Bruce Courser is a 77 y.o. female DATE: 11/15/2018 PRIMARY DIAGNOSIS: SOB (shortness of breath) Acute exacerbation of CHF (congestive heart failure) ([facility] ICD-10: Treatment Diagnosis: R13.12 Oropharyngeal Dysphagia. INTERDISCIPLINARY COLLABORATION: Registered Nurse PRECAUTIONS/ALLERGIES: Sulfa (sulfonamide antibiotics) and Aspirin ASSESSMENT:Based on the objective data described below, Ms. Gladys Jones presents with swallow function that is within normal limits. Patient presented with thin liquid via cup and straw, puree, mixed, and solid consistencies. Appropriate oral prep with all textures despite being edentulous. Timely swallow initiation, and single swallows upon palpation. Adequate oral clearing. No overt signs or symptoms of airway compromise observed with liquid or solid textures. Recommend continue with regular diet/thin liquids. Medications whole with liquid wash. Patient will benefit from skilled intervention to address the below impairments. ?????? ? ? This section established at most recent assessment?????????? 
PROBLEM LIST (Impairments causing functional limitations): 1. Oropharyngeal dysphagia- No symptoms identified 2. REHABILITATION POTENTIAL FOR STATED GOALS: Excellent PLAN OF CARE:  
Patient will benefit from skilled intervention to address the following impairments. RECOMMENDATIONS AND PLANNED INTERVENTIONS (Benefits and precautions of therapy have been discussed with the patient.): 
· continue prescribed diet MEDICATIONS: 
· With liquid ASPIRATION PRECAUTIONS: 
· Slow rate of intake · Small bites/sips · Upright at 90 degrees during meal 
COMPENSATORY STRATEGIES/MODIFICATIONS INCLUDING: 
· None OTHER RECOMMENDATIONS (including follow up treatment recommendations):  
· None RECOMMENDED DIET MODIFICATIONS DISCUSSED WITH: 
· Nursing · Patient FREQUENCY/DURATION: No further speech therapy indicated at this time as oropharyngeal swallow function is within normal limits. RECOMMENDED REHABILITATION/EQUIPMENT: (at time of discharge pending progress): Due to the probability of continued deficits (see above) this patient will not likely need continued skilled speech therapy after discharge. SUBJECTIVE:  
Alert, cooperative. Good spirits, joking with clinician. History of Present Injury/Illness: Ms. Zev Lew  has a past medical history of Anticoagulated on Coumadin, CAD (coronary artery disease), Cardiomyopathy ([address] CHF (congestive heart failure) ([address] CKD (chronic kidney disease) stage 3, GFR 30-59 ml/min (HCC), COPD (chronic obstructive pulmonary disease) ([address] Essential hypertension, benign, HLD (hyperlipidemia), ICD (implantable cardioverter-defibrillator) in place, Iron deficiency anemia due to chronic blood loss, MDS (myelodysplastic syndrome) ([address] REJI (obstructive sleep apnea)-cpap, Osteoarthritis, Osteopenia, Pulmonary hypertension ([address] Quadrantanopia, Skin defect, and Visual complaint. .  She also  has a past surgical history that includes cardiac catheterization (); ir bx bone marrow diagnostic (2011); hx aortic valve replacement (, ); hx  section; hx tubal ligation; hx heart catheterization (); hx coronary stent placement (May, 2014); hx pacemaker (10/2/2014); hx endoscopy (2009); IMPLANTATION OF CARDIOVERTER DEFIBRILLATOR (N/A, 10/2/2014); BRONCHOSCOPY (N/A, 2014); and ANESTHESIA FOR COLONOSCOPY AND ESOPHAGOGASTRODUODENOSCOPY (N/A, 2012). Present Symptoms: Oropharyngeal dysphagia- No symptoms identified Pain Scale 1: Numeric (0 - 10) Pain Intensity 1: 0 Current Medications: No current facility-administered medications on file prior to encounter. Current Outpatient Medications on File Prior to Encounter Medication Sig Dispense Refill  fluticasone-vilanterol (BREO ELLIPTA) 200-25 mcg/dose inhaler Take 1 Puff by inhalation daily. 1 Inhaler 11  
 simvastatin (ZOCOR) 20 mg tablet Take  by mouth nightly.  OTHER Trilogy  mometasone-formoterol (DULERA) 200-5 mcg/actuation HFA inhaler 2 puffs bid, rinse mouth after use. 3 Inhaler 3  
 isosorbide mononitrate ER (IMDUR) 30 mg tablet Take 30 mg by mouth daily.  aspirin delayed-release 81 mg tablet Take 81 mg by mouth.  spironolactone (ALDACTONE) 25 mg tablet Take 25 mg by mouth daily.  warfarin (COUMADIN) 2.5 mg tablet Take 2.5 mg by mouth nightly. Needs 5mg on Friday  docusate sodium (COLACE) 50 mg capsule Take 50 mg by mouth daily.  carvedilol (COREG) 6.25 mg tablet Take 1 Tab by mouth two (2) times daily (with meals). (Patient taking differently: Take 3.125 mg by mouth two (2) times daily (with meals). ) 60 Tab 3  furosemide (LASIX) 40 mg tablet Take 1 Tab by mouth daily. 30 Tab 6  
 traZODone (DESYREL) 50 mg tablet Take 0.5-1 Tabs by mouth nightly. (Patient taking differently: Take 25-50 mg by mouth as needed.) 60 Tab 2  
 traMADol (ULTRAM) 50 mg tablet TAKE 1 TABLET BY MOUTH EVERY 4 HOURS AS NEEDED (Patient taking differently: Take 50 mg by mouth every six (6) hours as needed for Pain.) 180 Tab 1  
 escitalopram oxalate (LEXAPRO) 20 mg tablet TAKE 1 TAB BY MOUTH DAILY. INDICATIONS: ANXIETY WITH DEPRESSION 90 Tab 4  
 sacubitril-valsartan (ENTRESTO) 24 mg/26 mg tablet Take 1 Tab by mouth two (2) times a day. INDICATIONS: CHRONIC HEART FAILURE (Patient taking differently: Take 1 Tab by mouth daily. INDICATIONS: CHRONIC HEART FAILURE) 60 Tab 6  
 multivit-minerals/folic acid (ADULT ONE DAILY MULTIVITAMIN PO) Take 1 Tab by mouth daily.  ascorbic acid, vitamin C, (VITAMIN C) 500 mg tablet Take 500 mg by mouth daily.     
 ferrous gluconate 324 mg (38 mg iron) tablet TAKE 1 TAB BY MOUTH DAILY (BEFORE BREAKFAST). INDICATIONS: IRON DEFICIENCY ANEMIA (Patient taking differently: 325 mg two (2) times daily (with meals). TAKE 1 TAB BY MOUTH DAILY (BEFORE BREAKFAST). INDICATIONS: IRON DEFICIENCY ANEMIA Takes twice a day) 90 Tab 3  
 fluticasone (FLONASE) 50 mcg/actuation nasal spray 2 Sprays by Both Nostrils route daily as needed. 3 Bottle 3  
 loratadine (CLARITIN) 10 mg tablet TAKE 1 TAB BY MOUTH DAILY. (Patient taking differently: TAKE 1 TAB BY MOUTH DAILY PRN) 30 Tab 1  
 albuterol (PROVENTIL VENTOLIN) 2.5 mg /3 mL (0.083 %) nebulizer solution 3 mL by Nebulization route every four (4) hours as needed for Wheezing. 120 Each 11  
 albuterol (VENTOLIN HFA) 90 mcg/actuation inhaler 2 puffs qid prn (Patient taking differently: Take 2 Puffs by inhalation every six (6) hours as needed for Wheezing or Shortness of Breath.) 1 Inhaler 11  
 cholecalciferol, VITAMIN D3, (VITAMIN D3) 5,000 unit tab tablet Take 1 Tab by mouth daily. (Patient taking differently: Take 2,000 Units by mouth daily.) 90 Tab 4  
 acetaminophen (TYLENOL) 325 mg tablet Take 650 mg by mouth every four (4) hours as needed for Pain.  OXYGEN-AIR DELIVERY SYSTEMS 3 L by Nasal route continuous.  nitroglycerin (NITROSTAT) 0.4 mg SL tablet 0.4 mg by SubLINGual route every five (5) minutes as needed for Chest Pain. Current Dietary Status:   
 Regular/thin 
Social History/Home Situation:   
Home Environment: Private residence # Steps to Enter: 0 One/Two Story Residence: One story Living Alone: Yes Support Systems: Child(alysha), Family member(s) Patient Expects to be Discharged to[de-identified] Private residence Current DME Used/Available at Home: Walker, rollator, Oxygen, portable OBJECTIVE:  
Respiratory Status:  Nasal cannula  3 l/min Oral Motor Structure/Speech:  Oral-Motor Structure/Motor Speech Labial: No impairment Dentition: Edentulous Lingual: No impairment Cognitive and Communication Status: 
Neurologic State: Alert Orientation Level: Oriented X4 Cognition: Follows commands Perception: Appears intact Perseveration: No perseveration noted Safety/Judgement: Awareness of environment BEDSIDE SWALLOW EVALUATION Oral Assessment: 
Oral Assessment Labial: No impairment Dentition: Edentulous Lingual: No impairment P.O. Trials: 
Patient Position: Upright in bedside chair The patient was given tsp-small bite amounts of the following:  
Consistency Presented: Thin liquid; Solid;Mixed consistency;Puree How Presented: Self-fed/presented;Cup/sip;Straw;Spoon; Successive swallows ORAL PHASE: 
Bolus Acceptance: No impairment Bolus Formation/Control: No impairment Propulsion: No impairment Oral Residue: None PHARYNGEAL PHASE: 
Initiation of Swallow: No impairment Laryngeal Elevation: Functional 
Aspiration Signs/Symptoms: None Vocal Quality: No impairment Cues for Modifications: None Effective Modifications: None Pharyngeal Phase Characteristics: No impairment, issues, or problems OTHER OBSERVATIONS: 
Rate/bite size: WNL Endurance: WNL Comments: Tool Used: Dysphagia Outcome and Severity Scale (ILDA) Score Comments Normal Diet  [] 7 With no strategies or extra time needed Functional Swallow  [x] 6 May have mild oral or pharyngeal delay Mild Dysphagia 
  [] 5 Which may require one diet consistency restricted (those who demonstrate penetration which is entirely cleared on MBS would be included) Mild-Moderate Dysphagia  [] 4 With 1-2 diet consistencies restricted Moderate Dysphagia  [] 3 With 2 or more diet consistencies restricted Moderately Severe Dysphagia  [] 2 With partial PO strategies (trials with ST only) Severe Dysphagia  [] 1 With inability to tolerate any PO safely Score:  Initial: 6 Most Recent: X (Date: -- ) Interpretation of Tool: The Dysphagia Outcome and Severity Scale (ILDA) is a simple, easy-to-use, 7-point scale developed to systematically rate the functional severity of dysphagia based on objective assessment and make recommendations for diet level, independence level, and type of nutrition. Score 7 6 5 4 3 2 1 Modifier CH CI CJ CK CL CM CN ? Swallowing:  
  - CURRENT STATUS: CI - 1%-19% impaired, limited or restricted  - GOAL STATUS:  CI - 1%-19% impaired, limited or restricted  - D/C STATUS:  CI - 1%-19% impaired, limited or restricted [place] / [place] / Product Type: Medicare /  
 
TREATMENT:  
 (In addition to Assessment/Re-Assessment sessions the following treatments were rendered) Assessment/Reassessment only, no treatment provided today MODALITIES:  
  
  
  
  
  
  
  
  
  
  
    
  
  
  
  
  
  
  
  
   
 
ORAL MOTOR  EXERCISES: 
  
  
  
  
  
  
  
  
  
  
  
  
  
  
  
  
  
  
  
  
  
  
  
  
  
  
    
  
  
  
  
  
  
  
  
  
  
  
  
  
  
  
  
  
  
  
  
  
  
  
  
  
   
 
LARYNGEAL / PHARYNGEAL EXERCISES: 
  
  
  
  
  
  
  
  
  
  
  
  
  
  
  
  
  
  
  
  
  
    
  
  
  
  
  
  
  
  
  
  
  
  
  
  
  
  
  
  
  
   
 
__________________________________________________________________________________________________ Safety: After treatment position/precautions: · Up in chair. Recommendations/Intent for next treatment session No further speech therapy indicated at this time as swallow function is within normal limits. Please re-consult if new concerns arise. Total Treatment Duration: 
Time In: 1336 Time Out: 5129 Nicole Unger, [employer] [facility], [facility] MEHDI MCLEOD, CCC-SLP

## 2018-11-15 NOTE — CONSULTS
[facility] Cardiology Consult Attending Cardiologist:Dr. Samantha Long Primary Cardiologist:Dr. Samantha Long Primary Care Physician:Dr. Jose Daniel Kramer           Referring--Dr. Isma Kearney Subjective:  
 
Vianca Mary is a 77 y.o. female with known hx of SHF, mechanical AVR, COPD on home oxygen, HTN,HLP, MDS,  BiotroniK ICD--dual chamber set at VVI PPM  in place. She presented yesterday with complaints of worsening SOB for last few days. She has been compliant with taking her HF medications and coumadin. She is routinely followed in our coumadin clinic. Denies CP, tachycardia or ICD shocks. Presenting BNP > 900. Today BNP > 1100. We are asked to see pt for SHF. Past Medical History:  
Diagnosis Date  Anticoagulated on Coumadin 7/9/2013 S/P AVR  CAD (coronary artery disease) 1/20/2013 5/8/14 PCI LAD with stent placed  Cardiomyopathy ([address]  CHF (congestive heart failure) ([address] 2/17/2017  CKD (chronic kidney disease) stage 3, GFR 30-59 ml/min ([facility] 7/10/2013  COPD (chronic obstructive pulmonary disease) ([address] 4/2/2014  Essential hypertension, benign 1/20/2013  HLD (hyperlipidemia)  ICD (implantable cardioverter-defibrillator) in place 10/2/2014 Biotronik single-chamber ICD implantation 10/20/14  Iron deficiency anemia due to chronic blood loss 7/29/2009  MDS (myelodysplastic syndrome) ([address] 12/17/2011 Procrit started in August, 2011 12/18/11 Procrit weekly and Iron stores. 5-12 12-13-12 good response to 3 weekly procrit did not need it last time  3-7-13 Pt doing well. Just wanted a \"check-up. \" Responding to Procrit every three weeks. 8-29-13 patient has missed some injections on recently restarted hemoglobin only issue , takes oral iron iron stores each time  REJI (obstructive sleep apnea)-cpap 4/2/2014  Osteoarthritis  Osteopenia  Pulmonary hypertension ([address] 6/15/2016  Quadrantanopia  Skin defect 11/1/2018  Visual complaint 2015 Past Surgical History:  
Procedure Laterality Date [address]    HX AORTIC VALVE REPLACEMENT  ,   
 mechanical valve   HX  SECTION    
 HX CORONARY STENT PLACEMENT  May, 2014 STEMI with Stent placement.  HX ENDOSCOPY  2009 EGD [address] CATHETERIZATION    HX PACEMAKER  10/2/2014 Biotronik ICD  HX TUBAL LIGATION    
 IR BX BONE MARROW DIAGNOSTIC  2011 Current Facility-Administered Medications Medication Dose Route Frequency  warfarin (COUMADIN) tablet 2.5 mg  2.5 mg Oral QPM  
 hydrALAZINE (APRESOLINE) 20 mg/mL injection 10 mg  10 mg IntraVENous Q6H PRN  
 carvedilol (COREG) tablet 6.25 mg  6.25 mg Oral BID WITH MEALS  escitalopram oxalate (LEXAPRO) tablet 20 mg  20 mg Oral DAILY  fluticasone (FLONASE) 50 mcg/actuation nasal spray 2 Spray  2 Spray Both Nostrils DAILY  isosorbide mononitrate ER (IMDUR) tablet 30 mg  30 mg Oral DAILY  loratadine (CLARITIN) tablet 10 mg  10 mg Oral DAILY  sacubitril-valsartan (ENTRESTO) 24-26 mg tablet 1 Tab  1 Tab Oral DAILY  simvastatin (ZOCOR) tablet 20 mg  20 mg Oral QHS  ondansetron (ZOFRAN) injection 4 mg  4 mg IntraVENous Q6H PRN  
 acetaminophen (TYLENOL) tablet 650 mg  650 mg Oral Q6H PRN  
 furosemide (LASIX) injection 60 mg  60 mg IntraVENous Q8H  
 tuberculin injection 5 Units  5 Units IntraDERMal ONCE  
 albuterol-ipratropium (DUO-NEB) 2.5 MG-0.5 MG/3 ML  3 mL Nebulization QID RT  
 albuterol-ipratropium (DUO-NEB) 2.5 MG-0.5 MG/3 ML  3 mL Nebulization Q4H PRN  
 budesonide (PULMICORT) 500 mcg/2 ml nebulizer suspension  500 mcg Nebulization BID RT Allergies Allergen Reactions  Sulfa (Sulfonamide Antibiotics) Hives  Aspirin Nausea Only Cannot take uncoated aspirin Social History Tobacco Use  Smoking status: Former Smoker Packs/day: 0.25 Years: 42.00 Pack years: 10.50 Types: Cigarettes Start date: 10/1/1956 Last attempt to quit: 2014 Years since quittin.5  Smokeless tobacco: Never Used Substance Use Topics  Alcohol use: No  
  Alcohol/week: 0.0 oz Family History Problem Relation Age of Onset  Heart Disease Mother CHF  Hypertension Mother  Kidney Disease Mother  Heart Disease Father CHF  Lung Disease Father  Diabetes Father  Cancer Father   
     prostate  Hypertension Father  Heart Attack Father  Heart Disease Maternal Aunt  Diabetes Brother  Coronary Artery Disease Other  Breast Cancer Neg Hx Review of Systems Gen: Denies fever, chills, malaise or fatigue. Appetite good. HEENT: Denies frequent headaches, dizzyness, visual disturbances, Neck pain or swallowing difficulty Lungs: as above Cardiovascular: as above GI: Denies hememesis, dark tarry stools, No prior Hx of GI bleed, Denies constipation : Denies dysuria, no complaints of frequency, nocturia Heme: No prior bleeding disorders, no prior Cancer Neuro: Denies prior CVA, TIA. Endocrine: no diabetes, thyroid disorders Psychiatric: Denies anxiety, or other psychiatric illnesses. Objective:  
 
Visit Vitals /79 (BP Patient Position: At rest) Pulse 62 Temp 97.3 °F (36.3 °C) Resp 20 Ht 5' 2\" (1.575 m) Wt 100.7 kg (221 lb 14.4 oz) SpO2 96% Breastfeeding? No  
BMI 40.59 kg/m² General:Alert, cooperative, no distress, appears stated age Head: Normocephalic, without obvious abnormality, atraumatic. Eyes: Conjunctivae/corneas clear. PERRL, EOMs intact Nose:Nares normal. Septum midline. Mucosa normal. No drainage or sinus tenderness. Throat: Lips, mucosa, and tongue normal. Teeth and gums normal.  
Neck: Supple, symmetrical, trachea midline,  no carotid bruit and no JVD. Lungs:Clear to auscultation bilaterally. Chest wall: No tenderness or deformity. Heart: Regular rate and rhythm, S1, S2 normal, no murmur, click, rub or gallop. Abdomen:Soft, non-tender. Bowel sounds normal. No masses, No organomegaly. Extremities: Extremities normal, atraumatic, no cyanosis or edema. Pulses: 2+ and symmetric all extremities. Skin: Skin color, texture, turgor normal. No rashes or lesions Lymph nodes: Cervical, supraclavicular, and axillary nodes normal 
Neurologic:No focal deficits identified ECG: NSR with nonspecific IVCD. Data Review:  
 
Recent Results (from the past 24 hour(s)) EKG, 12 LEAD, INITIAL Collection Time: 11/14/18 10:18 AM  
Result Value Ref Range Ventricular Rate 77 BPM  
 Atrial Rate 77 BPM  
 P-R Interval 152 ms QRS Duration 138 ms Q-T Interval 388 ms QTC Calculation (Bezet) 439 ms Calculated P Axis 75 degrees Calculated R Axis -57 degrees Calculated T Axis 103 degrees Diagnosis    
  !! AGE AND GENDER SPECIFIC ECG ANALYSIS !! Normal sinus rhythm Left axis deviation Non-specific intra-ventricular conduction block Incomplete left bundle branch 
 block Cannot rule out Anterior infarct (cited on or before 29-JAN-2000) T wave abnormality, consider lateral ischemia Abnormal ECG When compared with ECG of 07-SEP-2018 18:10, 
Questionable change in initial forces of Lateral leads T wave inversion more evident in Lateral leads Confirmed by CHERELLE MILLER (), Gab Leavitt ([id number]) on 11/14/2018 12:22:20 PM 
  
CBC WITH AUTOMATED DIFF Collection Time: 11/14/18 10:28 AM  
Result Value Ref Range WBC 9.7 4.3 - 11.1 K/uL  
 RBC 3.33 (L) 4.05 - 5.2 M/uL HGB 9.4 (L) 11.7 - 15.4 g/dL HCT 30.9 (L) 35.8 - 46.3 % MCV 92.8 79.6 - 97.8 FL  
 MCH 28.2 26.1 - 32.9 PG  
 MCHC 30.4 (L) 31.4 - 35.0 g/dL  
 RDW 15.6 % PLATELET 688 849 - 879 K/uL MPV 11.7 9.4 - 12.3 FL ABSOLUTE NRBC 0.00 0.0 - 0.2 K/uL  
 DF AUTOMATED NEUTROPHILS 69 43 - 78 % LYMPHOCYTES 24 13 - 44 %  MONOCYTES 6 4.0 - 12.0 %  
 EOSINOPHILS 1 0.5 - 7.8 % BASOPHILS 1 0.0 - 2.0 % IMMATURE GRANULOCYTES 0 0.0 - 5.0 %  
 ABS. NEUTROPHILS 6.6 1.7 - 8.2 K/UL  
 ABS. LYMPHOCYTES 2.3 0.5 - 4.6 K/UL  
 ABS. MONOCYTES 0.6 0.1 - 1.3 K/UL  
 ABS. EOSINOPHILS 0.1 0.0 - 0.8 K/UL  
 ABS. BASOPHILS 0.1 0.0 - 0.2 K/UL  
 ABS. IMM. GRANS. 0.0 0.0 - 0.5 K/UL METABOLIC PANEL, COMPREHENSIVE Collection Time: 11/14/18 10:28 AM  
Result Value Ref Range Sodium 141 136 - 145 mmol/L Potassium 4.7 3.5 - 5.1 mmol/L Chloride 104 98 - 107 mmol/L  
 CO2 32 21 - 32 mmol/L Anion gap 5 (L) 7 - 16 mmol/L Glucose 120 (H) 65 - 100 mg/dL BUN 26 (H) 8 - 23 MG/DL Creatinine 1.37 (H) 0.6 - 1.0 MG/DL  
 GFR est AA 49 (L) >60 ml/min/1.73m2 GFR est non-AA 41 (L) >60 ml/min/1.73m2 Calcium 9.4 8.3 - 10.4 MG/DL Bilirubin, total 0.9 0.2 - 1.1 MG/DL  
 ALT (SGPT) 24 12 - 65 U/L  
 AST (SGOT) 29 15 - 37 U/L Alk. phosphatase 55 50 - 136 U/L Protein, total 7.9 6.3 - 8.2 g/dL Albumin 3.7 3.2 - 4.6 g/dL Globulin 4.2 (H) 2.3 - 3.5 g/dL A-G Ratio 0.9 (L) 1.2 - 3.5 PROTHROMBIN TIME + INR Collection Time: 11/14/18 10:28 AM  
Result Value Ref Range Prothrombin time 22.7 (H) 11.5 - 14.5 sec INR 2.1 PROCALCITONIN Collection Time: 11/14/18 10:28 AM  
Result Value Ref Range Procalcitonin <0.1 ng/mL POC TROPONIN-I Collection Time: 11/14/18 10:38 AM  
Result Value Ref Range Troponin-I (POC) 0.01 (L) 0.02 - 0.05 ng/ml POC LACTIC ACID Collection Time: 11/14/18 10:40 AM  
Result Value Ref Range Lactic Acid (POC) 0.88 0.5 - 1.9 mmol/L  
BNP Collection Time: 11/14/18 10:48 AM  
Result Value Ref Range  pg/mL INFLUENZA A & B AG (RAPID TEST) Collection Time: 11/14/18 11:38 AM  
Result Value Ref Range Influenza A Ag NEGATIVE  NEG Influenza B Ag NEGATIVE  NEG Source NASOPHARYNGEAL    
POC G3 Collection Time: 11/14/18  2:52 PM  
Result Value Ref Range Device: NASAL CANNULA pH (POC) 7.313 (L) 7.35 - 7.45    
 pCO2 (POC) 57.8 (H) 35 - 45 MMHG  
 pO2 (POC) 68 (L) 75 - 100 MMHG  
 HCO3 (POC) 29.3 (H) 22 - 26 MMOL/L  
 sO2 (POC) 91 (L) 95 - 98 % Base excess (POC) 2 mmol/L Allens test (POC) YES Site LEFT RADIAL Patient temp. 98.6 Specimen type (POC) ARTERIAL Performed by Vj   
 CO2, POC 31 MMOL/L Flow rate (POC) 5.000 L/min GLUCOSE, POC Collection Time: 11/14/18  8:28 PM  
Result Value Ref Range Glucose (POC) 245 (H) 65 - 100 mg/dL PROTHROMBIN TIME + INR Collection Time: 11/15/18  2:36 AM  
Result Value Ref Range Prothrombin time 23.8 (H) 11.5 - 14.5 sec INR 2.3 METABOLIC PANEL, COMPREHENSIVE Collection Time: 11/15/18  2:36 AM  
Result Value Ref Range Sodium 141 136 - 145 mmol/L Potassium 4.7 3.5 - 5.1 mmol/L Chloride 103 98 - 107 mmol/L  
 CO2 32 21 - 32 mmol/L Anion gap 6 (L) 7 - 16 mmol/L Glucose 169 (H) 65 - 100 mg/dL BUN 27 (H) 8 - 23 MG/DL Creatinine 1.39 (H) 0.6 - 1.0 MG/DL  
 GFR est AA 48 (L) >60 ml/min/1.73m2 GFR est non-AA 40 (L) >60 ml/min/1.73m2 Calcium 9.3 8.3 - 10.4 MG/DL Bilirubin, total 0.6 0.2 - 1.1 MG/DL  
 ALT (SGPT) 24 12 - 65 U/L  
 AST (SGOT) 18 15 - 37 U/L Alk. phosphatase 60 50 - 136 U/L Protein, total 7.9 6.3 - 8.2 g/dL Albumin 3.7 3.2 - 4.6 g/dL Globulin 4.2 (H) 2.3 - 3.5 g/dL A-G Ratio 0.9 (L) 1.2 - 3.5    
CBC WITH AUTOMATED DIFF Collection Time: 11/15/18  2:36 AM  
Result Value Ref Range WBC 4.5 4.3 - 11.1 K/uL  
 RBC 3.47 (L) 4.05 - 5.2 M/uL HGB 9.8 (L) 11.7 - 15.4 g/dL HCT 31.5 (L) 35.8 - 46.3 % MCV 90.8 79.6 - 97.8 FL  
 MCH 28.2 26.1 - 32.9 PG  
 MCHC 31.1 (L) 31.4 - 35.0 g/dL  
 RDW 15.4 % PLATELET 309 204 - 368 K/uL MPV 11.3 9.4 - 12.3 FL ABSOLUTE NRBC 0.00 0.0 - 0.2 K/uL  
 DF AUTOMATED NEUTROPHILS 81 (H) 43 - 78 % LYMPHOCYTES 17 13 - 44 % MONOCYTES 2 (L) 4.0 - 12.0 % EOSINOPHILS 0 (L) 0.5 - 7.8 % BASOPHILS 0 0.0 - 2.0 % IMMATURE GRANULOCYTES 0 0.0 - 5.0 %  
 ABS. NEUTROPHILS 3.7 1.7 - 8.2 K/UL  
 ABS. LYMPHOCYTES 0.8 0.5 - 4.6 K/UL  
 ABS. MONOCYTES 0.1 0.1 - 1.3 K/UL  
 ABS. EOSINOPHILS 0.0 0.0 - 0.8 K/UL  
 ABS. BASOPHILS 0.0 0.0 - 0.2 K/UL  
 ABS. IMM. GRANS. 0.0 0.0 - 0.5 K/UL BNP Collection Time: 11/15/18  2:36 AM  
Result Value Ref Range BNP 1,611 pg/mL MAGNESIUM Collection Time: 11/15/18  2:36 AM  
Result Value Ref Range Magnesium 2.0 1.8 - 2.4 mg/dL PHOSPHORUS Collection Time: 11/15/18  2:36 AM  
Result Value Ref Range Phosphorus 4.0 (H) 2.3 - 3.7 MG/DL Assessment / Plan Principal Problem: 
  Acute exacerbation of CHF (congestive heart failure) (HCC) (11/14/2018)--acute SHF noted, with minimal volume overload by exam. Agree with IV lasix. Will uptitrate home meds as BP allows. Monitor renal function closely with diuretic challenge and increasing entresto. Consider adding aldactone if K remains stable. Also long-term after discharge, consider upgrade of device to CRT. Echo today demonstrates unchanged EF  25%. Review of telemetry with NSR only one 7 bed run of NSVT--this is to be expected given her EF. Active Problems: 
  SOB (shortness of breath) (11/14/2018) Melba Dennis NP

## 2018-11-15 NOTE — PROGRESS NOTES
Patient remains in stable condition. No acute distress noted. Trilogy in place. Wound vac to left knee patent with suction at 125mmHg. No needs noted or voiced at this time. Safety measures remain in place. Call light is within reach. Preparing to give report to oncoming shift.

## 2018-11-15 NOTE — PROGRESS NOTES
Problem: Mobility Impaired (Adult and Pediatric) Goal: *Acute Goals and Plan of Care (Insert Text) Discharge Goals: 
(1.)Ms. Lake Mccracken will move from supine to sit and sit to supine , scoot up and down and roll side to side in bed with MODIFIED INDEPENDENCE within 7 treatment day(s). (2.)Ms. Lake Mccracken will transfer from bed to chair and chair to bed with INDEPENDENT using the least restrictive device within 7 treatment day(s). (3.)Ms. Lake Mccracken will ambulate with INDEPENDENT for 250+ feet with stable vital signs with the least restrictive device within 7 treatment day(s). (4.)Ms. Lake Mccracken will tolerate 25+ minutes of therapeutic activity/exercise to improve activity tolerance within 7 treatment days. ________________________________________________________________________________________________ PHYSICAL THERAPY: Initial Assessment, Treatment Day: Day of Assessment, AM 11/15/2018 INPATIENT: Hospital Day: 2 [place] / Product Type: Medicare /  
  
NAME/AGE/GENDER: Ernestine Finnegan is a 77 y.o. female PRIMARY DIAGNOSIS: SOB (shortness of breath) Acute exacerbation of CHF (congestive heart failure) ([facility]) Acute exacerbation of CHF (congestive heart failure) ([facility]) Acute exacerbation of CHF (congestive heart failure) ([facility] ICD-10: Treatment Diagnosis: · Difficulty in walking, Not elsewhere classified (R26.2) · History of falling (Z91.81) Precaution/Allergies: 
Sulfa (sulfonamide antibiotics) and Aspirin ASSESSMENT:  
 
Ms. Lake Mccracken is a pleasant 77 y.o. Female with primary diagnosis listed above. Pt is sitting in bedside chair upon contact, A&O x 4, and agreeable to PT Evaluation.  Pt states she lives alone, has a wound vac on her L knee due to one recent fall when she \"missed a step\" and came down on her knee, ambulates independently normally but has been using a rollator for the last week to carry her wound vac, is on 3L supplemental O2 at all times, and has family close by if she needs anything. Pt reports no pain currently, and is on 3L currently with O2 sats of 97%. Pt performed STS with supervision, demonstrating good standing balance without support. Pt just received new wound vac which has not had time to charge, so ambulation distance was limited by power cord. Pt ambulated 10ft around room with no AD and supervision, demonstrating slow but steady gait before returning to bedside chair. Pt states no knee pain with WBing or resistance. B LE indicates AROM and strength WFL, with sensation intact. Pt then performed seated and standing exercises listed below with cuing for body mechanics and rest breaks in between sets. Pt left upright in bedside chair with all needs met and within reach. [address] will benefit from skilled PT (medically necessary) to address decreased functional tolerance and decreased cardiopulmonary endurance affecting participation in basic ADLs and functional tasks. Pt may benefit from HHPT upon discharge from hospital. 
 
 
This section established at most recent assessment PROBLEM LIST (Impairments causing functional limitations): 1. Decreased Ambulation Ability/Technique 2. Decreased Activity Tolerance 3. Decreased Pacing Skills 4. Increased Fatigue 5. Increased Shortness of Breath 6. Decreased [place] with Home Exercise Program 
 INTERVENTIONS PLANNED: (Benefits and precautions of physical therapy have been discussed with the patient.) 1. Balance Exercise 2. Bed Mobility 3. Family Education 4. Gait Training 5. Home Exercise Program (HEP) 6. Manual Therapy 7. Neuromuscular Re-education/Strengthening 8. Range of Motion (ROM) 9. Therapeutic Activites 10. Therapeutic Exercise/Strengthening 11. Transfer Training TREATMENT PLAN: Frequency/Duration: 3 times a week for duration of hospital stay Rehabilitation Potential For Stated Goals: Good RECOMMENDED REHABILITATION/EQUIPMENT: (at time of discharge pending progress): Due to the probability of continued deficits (see above) this patient will likely need continued skilled physical therapy after discharge. Equipment:  
? None at this time HISTORY:  
History of Present Injury/Illness (Reason for Referral): Ms. Diaz is a 76 yo female with PMH of CAD, cardiomyopathy, CKD stage 3, COPD on home trilogy at night, 3 L O2 daytime, ICD, Myelodysplastic syndrome, iron deficiency anemia, HTN, hyperlipidemia, pulmonary HTN, systolic CHF EF 20-53% 3388, mechanical aortic valve who presented with c/o 3-4 day hx of increasingly worsening SOB, worse with exertion. She denies fever, chills, recent weight gain, edema, CP, sick contacts, cough. CXR showed no acute findings. . Lactic acid, PCT normal range. Influenza neg. Pt received lasix IV and steroids in the ED. 
  
 
 
Past Medical History/Comorbidities: Ms. Diaz  has a past medical history of Anticoagulated on Coumadin, CAD (coronary artery disease), Cardiomyopathy ([address] CHF (congestive heart failure) ([address] CKD (chronic kidney disease) stage 3, GFR 30-59 ml/min ([facility]), COPD (chronic obstructive pulmonary disease) ([address] Essential hypertension, benign, HLD (hyperlipidemia), ICD (implantable cardioverter-defibrillator) in place, Iron deficiency anemia due to chronic blood loss, MDS (myelodysplastic syndrome) ([address] REJI (obstructive sleep apnea)-cpap, Osteoarthritis, Osteopenia, Pulmonary hypertension ([address] Quadrantanopia, Skin defect, and Visual complaint.   Ms. Diaz  has a past surgical history that includes cardiac catheterization (); ir bx bone marrow diagnostic (2011); hx aortic valve replacement (, ); hx  section; hx tubal ligation; hx heart catheterization (); hx coronary stent placement (May, 2014); hx pacemaker (10/2/2014); hx endoscopy (7/2009); IMPLANTATION OF CARDIOVERTER DEFIBRILLATOR (N/A, 10/2/2014); BRONCHOSCOPY (N/A, 5/14/2014); and ANESTHESIA FOR COLONOSCOPY AND ESOPHAGOGASTRODUODENOSCOPY (N/A, 7/25/2012). Social History/Living Environment:  
Home Environment: Private residence # Steps to Enter: 0 One/Two Story Residence: One story Living Alone: Yes Support Systems: Child(alysha), Family member(s) Patient Expects to be Discharged to[de-identified] Private residence Current DME Used/Available at Home: Walker, rollator, Oxygen, portable Prior Level of Function/Work/Activity: 
Pt states she lives alone, has a wound vac on her L knee due to one recent fall when she \"missed a step\" and came down on her knee, ambulates independently normally but has been using a rollator for the last week to carry her wound vac, is on 3L supplemental O2 at all times, and has family close by if she needs anything. Number of Personal Factors/Comorbidities that affect the Plan of Care: 3+: HIGH COMPLEXITY EXAMINATION:  
Most Recent Physical Functioning:  
Gross Assessment: 
AROM: Within functional limits Strength: Within functional limits Coordination: Within functional limits Sensation: Intact Posture: 
  
Balance: 
Sitting: Intact Standing: Intact Bed Mobility: 
  
Wheelchair Mobility: 
  
Transfers: 
Sit to Stand: Supervision Stand to Sit: Supervision Gait: 
  
Speed/Winifred: Slow;Shuffled Step Length: Left shortened;Right shortened Gait Abnormalities: Decreased step clearance Distance (ft): 10 Feet (ft)(total, forward and side stepping) Assistive Device: Other (comment)(none) Ambulation - Level of Assistance: Supervision Body Structures Involved: 1. Heart 2. Lungs 3. Bones 4. Joints 5. Muscles 6. Ligaments Body Functions Affected: 1. Cardio 2. Respiratory 3. Neuromusculoskeletal 
4. Movement Related Activities and Participation Affected: 1. Mobility 2. Domestic Life 3. Interpersonal Interactions and Relationships 4. Community, Social and [place] Number of elements that affect the Plan of Care: 4+: HIGH COMPLEXITY CLINICAL PRESENTATION:  
Presentation: Stable and uncomplicated: LOW COMPLEXITY CLINICAL DECISION MAKIN [facility] [address] [id number] AM-PAC 6 Clicks Basic Mobility Inpatient Short Form How much difficulty does the patient currently have. .. Unable A Lot A Little None 1. Turning over in bed (including adjusting bedclothes, sheets and blankets)? [] 1   [] 2   [] 3   [x] 4  
2. Sitting down on and standing up from a chair with arms ( e.g., wheelchair, bedside commode, etc.)   [] 1   [] 2   [] 3   [x] 4  
3. Moving from lying on back to sitting on the side of the bed? [] 1   [] 2   [] 3   [x] 4 How much help from another person does the patient currently need. .. Total A Lot A Little None 4. Moving to and from a bed to a chair (including a wheelchair)? [] 1   [] 2   [x] 3   [] 4  
5. Need to walk in hospital room? [] 1   [] 2   [x] 3   [] 4  
6. Climbing 3-5 steps with a railing? [] 1   [x] 2   [] 3   [] 4  
© , Trustees of [address] [place] [address] [address] [id number], under license to [employer]. All rights reserved Score:  Initial: 20 Most Recent: X (Date: -- ) Interpretation of Tool:  Represents activities that are increasingly more difficult (i.e. Bed mobility, Transfers, Gait). Score 24 23 22-20 19-15 14-10 9-7 6 Modifier CH CI CJ CK CL CM CN   
 
? Mobility - Walking and Moving Around:  
  - CURRENT STATUS: CJ - 20%-39% impaired, limited or restricted  - GOAL STATUS: CI - 1%-19% impaired, limited or restricted  - D/C STATUS:  ---------------To be determined--------------- [place] / [place] / Product Type: Medicare /   
 
Medical Necessity:    
· Patient demonstrates good rehab potential due to higher previous functional level. Reason for Services/Other Comments: · Patient continues to require skilled intervention due to impaired activity tolerance and functional mobility. Use of outcome tool(s) and clinical judgement create a POC that gives a: Clear prediction of patient's progress: LOW COMPLEXITY  
  
 
 
 
TREATMENT:  
(In addition to Assessment/Re-Assessment sessions the following treatments were rendered) Pre-treatment Symptoms/Complaints:  \"I'm ok\" Pain: Initial:  
Pain Intensity 1: 0  Post Session:  0/10 In addition to PT Evaluation: 
Therapeutic Exercise: (  8 minutes):  Exercises per grid below to improve mobility, strength, balance and coordination. Required minimal visual and verbal cues to promote proper body alignment and promote proper body mechanics. Progressed repetitions and complexity of movement as indicated. Date: 
11/15/2018 Date: 
 Date: Activity/Exercise Parameters Parameters Parameters STS with RW for support 4x Calf raises with RW for support 10 B x 2 Standing marching with RW for support 10 B x 2 Braces/Orthotics/Lines/Etc:  
· wounds vac · O2 Device: Nasal cannula Treatment/Session Assessment:   
· Response to Treatment:  See above · Interdisciplinary Collaboration:  
o Physical Therapist 
o Registered Nurse · After treatment position/precautions:  
o Up in chair 
o Bed/Chair-wheels locked 
o Bed in low position 
o Call light within reach · Compliance with Program/Exercises: Will assess as treatment progresses · Recommendations/Intent for next treatment session: \"Next visit will focus on advancements to more challenging activities and reduction in assistance provided\". Total Treatment Duration: PT Patient Time In/Time Out Time In: 1120 Time Out: 1147 Vahe Marques, PT

## 2018-11-15 NOTE — WOUND CARE
Pt seen for wound vac dressing change and change to in patient wound vac machine. Pt states that her wound vac has not been changed since last Thursday. Pt agreed to have wound vac dressing changed today and tomorrow to get back on the correct MWF schedule. Removed old wound vac dressing, yellow film removed from wound bed, pt states they had put something she was not sure what it was on the wound last time they changed it. Small skin tears noted on lower aspect of madi wound skin, covered with foam dressing to protect. Wound measuring 6 x 8 x 0.2 cm with irregular open edges, wound bed is red with small amount of sanguinous drainage. Madi wound is darkened as well. Cleansed with dermal wound cleanser and applied new wound vac dressing, attached to hospital wound vac, no leaks noted. Pt tolerated well. Will continue with MWF dressing changes. And will continue to follow.

## 2018-11-16 LAB
ANION GAP SERPL CALC-SCNC: 5 MMOL/L (ref 7–16)
BASOPHILS # BLD: 0 K/UL (ref 0–0.2)
BASOPHILS NFR BLD: 0 % (ref 0–2)
BUN SERPL-MCNC: 39 MG/DL (ref 8–23)
CALCIUM SERPL-MCNC: 9.6 MG/DL (ref 8.3–10.4)
CHLORIDE SERPL-SCNC: 98 MMOL/L (ref 98–107)
CO2 SERPL-SCNC: 37 MMOL/L (ref 21–32)
CREAT SERPL-MCNC: 1.67 MG/DL (ref 0.6–1)
DIFFERENTIAL METHOD BLD: ABNORMAL
EOSINOPHIL # BLD: 0.1 K/UL (ref 0–0.8)
EOSINOPHIL NFR BLD: 1 % (ref 0.5–7.8)
ERYTHROCYTE [DISTWIDTH] IN BLOOD BY AUTOMATED COUNT: 15.5 %
GLUCOSE BLD STRIP.AUTO-MCNC: 138 MG/DL (ref 65–100)
GLUCOSE BLD STRIP.AUTO-MCNC: 151 MG/DL (ref 65–100)
GLUCOSE BLD STRIP.AUTO-MCNC: 237 MG/DL (ref 65–100)
GLUCOSE BLD STRIP.AUTO-MCNC: 285 MG/DL (ref 65–100)
GLUCOSE SERPL-MCNC: 127 MG/DL (ref 65–100)
HCT VFR BLD AUTO: 35.4 % (ref 35.8–46.3)
HGB BLD-MCNC: 11.1 G/DL (ref 11.7–15.4)
IMM GRANULOCYTES # BLD: 0 K/UL (ref 0–0.5)
IMM GRANULOCYTES NFR BLD AUTO: 0 % (ref 0–5)
INR PPP: 2.3
LYMPHOCYTES # BLD: 1.9 K/UL (ref 0.5–4.6)
LYMPHOCYTES NFR BLD: 17 % (ref 13–44)
MAGNESIUM SERPL-MCNC: 2 MG/DL (ref 1.8–2.4)
MCH RBC QN AUTO: 28.5 PG (ref 26.1–32.9)
MCHC RBC AUTO-ENTMCNC: 31.4 G/DL (ref 31.4–35)
MCV RBC AUTO: 90.8 FL (ref 79.6–97.8)
MM INDURATION POC: 0 MM (ref 0–5)
MONOCYTES # BLD: 0.7 K/UL (ref 0.1–1.3)
MONOCYTES NFR BLD: 6 % (ref 4–12)
NEUTS SEG # BLD: 8.2 K/UL (ref 1.7–8.2)
NEUTS SEG NFR BLD: 75 % (ref 43–78)
NRBC # BLD: 0 K/UL (ref 0–0.2)
PHOSPHATE SERPL-MCNC: 3.9 MG/DL (ref 2.3–3.7)
PLATELET # BLD AUTO: 252 K/UL (ref 150–450)
PMV BLD AUTO: 11 FL (ref 9.4–12.3)
POTASSIUM SERPL-SCNC: 3.8 MMOL/L (ref 3.5–5.1)
PPD POC: NORMAL NEGATIVE
PROTHROMBIN TIME: 24.5 SEC (ref 11.5–14.5)
RBC # BLD AUTO: 3.9 M/UL (ref 4.05–5.2)
SODIUM SERPL-SCNC: 140 MMOL/L (ref 136–145)
TSH SERPL DL<=0.005 MIU/L-ACNC: 2.62 UIU/ML (ref 0.36–3.74)
WBC # BLD AUTO: 10.9 K/UL (ref 4.3–11.1)

## 2018-11-16 PROCEDURE — 94760 N-INVAS EAR/PLS OXIMETRY 1: CPT

## 2018-11-16 PROCEDURE — 74011250637 HC RX REV CODE- 250/637: Performed by: EMERGENCY MEDICINE

## 2018-11-16 PROCEDURE — 82962 GLUCOSE BLOOD TEST: CPT

## 2018-11-16 PROCEDURE — 84100 ASSAY OF PHOSPHORUS: CPT

## 2018-11-16 PROCEDURE — 85025 COMPLETE CBC W/AUTO DIFF WBC: CPT

## 2018-11-16 PROCEDURE — 74011000250 HC RX REV CODE- 250: Performed by: HOSPITALIST

## 2018-11-16 PROCEDURE — 74011250637 HC RX REV CODE- 250/637: Performed by: NURSE PRACTITIONER

## 2018-11-16 PROCEDURE — 74011250636 HC RX REV CODE- 250/636: Performed by: NURSE PRACTITIONER

## 2018-11-16 PROCEDURE — 77030019934 HC DRSG VAC ASST KCON -B

## 2018-11-16 PROCEDURE — 80048 BASIC METABOLIC PNL TOTAL CA: CPT

## 2018-11-16 PROCEDURE — 97110 THERAPEUTIC EXERCISES: CPT

## 2018-11-16 PROCEDURE — 77010033678 HC OXYGEN DAILY

## 2018-11-16 PROCEDURE — 94640 AIRWAY INHALATION TREATMENT: CPT

## 2018-11-16 PROCEDURE — 65660000000 HC RM CCU STEPDOWN

## 2018-11-16 PROCEDURE — 97605 NEG PRS WND THER DME<=50SQCM: CPT

## 2018-11-16 PROCEDURE — 85610 PROTHROMBIN TIME: CPT

## 2018-11-16 PROCEDURE — 97166 OT EVAL MOD COMPLEX 45 MIN: CPT

## 2018-11-16 PROCEDURE — 74011250637 HC RX REV CODE- 250/637: Performed by: HOSPITALIST

## 2018-11-16 PROCEDURE — 97530 THERAPEUTIC ACTIVITIES: CPT

## 2018-11-16 PROCEDURE — 74750000023 HC WOUND THERAPY

## 2018-11-16 PROCEDURE — 84443 ASSAY THYROID STIM HORMONE: CPT

## 2018-11-16 PROCEDURE — 3331090002 HH PPS REVENUE DEBIT

## 2018-11-16 PROCEDURE — 3331090001 HH PPS REVENUE CREDIT

## 2018-11-16 PROCEDURE — 36415 COLL VENOUS BLD VENIPUNCTURE: CPT

## 2018-11-16 PROCEDURE — 83735 ASSAY OF MAGNESIUM: CPT

## 2018-11-16 RX ORDER — INSULIN LISPRO 100 [IU]/ML
INJECTION, SOLUTION INTRAVENOUS; SUBCUTANEOUS
Status: DISCONTINUED | OUTPATIENT
Start: 2018-11-16 | End: 2018-11-20 | Stop reason: HOSPADM

## 2018-11-16 RX ORDER — FUROSEMIDE 40 MG/1
40 TABLET ORAL
Status: DISCONTINUED | OUTPATIENT
Start: 2018-11-16 | End: 2018-11-17

## 2018-11-16 RX ADMIN — CARVEDILOL 12.5 MG: 12.5 TABLET, FILM COATED ORAL at 09:11

## 2018-11-16 RX ADMIN — FUROSEMIDE 40 MG: 40 TABLET ORAL at 17:33

## 2018-11-16 RX ADMIN — LORATADINE 10 MG: 10 TABLET ORAL at 09:12

## 2018-11-16 RX ADMIN — IPRATROPIUM BROMIDE AND ALBUTEROL SULFATE 3 ML: .5; 3 SOLUTION RESPIRATORY (INHALATION) at 15:04

## 2018-11-16 RX ADMIN — BUDESONIDE 500 MCG: 0.5 INHALANT RESPIRATORY (INHALATION) at 19:28

## 2018-11-16 RX ADMIN — ACETAMINOPHEN 650 MG: 325 TABLET, FILM COATED ORAL at 18:34

## 2018-11-16 RX ADMIN — FUROSEMIDE 60 MG: 10 INJECTION, SOLUTION INTRAMUSCULAR; INTRAVENOUS at 06:23

## 2018-11-16 RX ADMIN — NYSTATIN: 100000 POWDER TOPICAL at 22:01

## 2018-11-16 RX ADMIN — PANTOPRAZOLE SODIUM 40 MG: 40 TABLET, DELAYED RELEASE ORAL at 06:24

## 2018-11-16 RX ADMIN — ISOSORBIDE MONONITRATE 30 MG: 30 TABLET, EXTENDED RELEASE ORAL at 09:12

## 2018-11-16 RX ADMIN — ACETAMINOPHEN 650 MG: 325 TABLET, FILM COATED ORAL at 12:30

## 2018-11-16 RX ADMIN — ESCITALOPRAM OXALATE 20 MG: 10 TABLET ORAL at 09:11

## 2018-11-16 RX ADMIN — BUDESONIDE 500 MCG: 0.5 INHALANT RESPIRATORY (INHALATION) at 07:30

## 2018-11-16 RX ADMIN — SACUBITRIL AND VALSARTAN 1 TABLET: 49; 51 TABLET, FILM COATED ORAL at 22:02

## 2018-11-16 RX ADMIN — CARVEDILOL 12.5 MG: 12.5 TABLET, FILM COATED ORAL at 17:33

## 2018-11-16 RX ADMIN — NYSTATIN: 100000 POWDER TOPICAL at 09:12

## 2018-11-16 RX ADMIN — SACUBITRIL AND VALSARTAN 1 TABLET: 49; 51 TABLET, FILM COATED ORAL at 09:12

## 2018-11-16 RX ADMIN — SIMVASTATIN 20 MG: 20 TABLET, FILM COATED ORAL at 22:02

## 2018-11-16 RX ADMIN — WARFARIN SODIUM 2.5 MG: 2.5 TABLET ORAL at 17:33

## 2018-11-16 RX ADMIN — IPRATROPIUM BROMIDE AND ALBUTEROL SULFATE 3 ML: .5; 3 SOLUTION RESPIRATORY (INHALATION) at 19:28

## 2018-11-16 RX ADMIN — IPRATROPIUM BROMIDE AND ALBUTEROL SULFATE 3 ML: .5; 3 SOLUTION RESPIRATORY (INHALATION) at 07:30

## 2018-11-16 RX ADMIN — FUROSEMIDE 40 MG: 40 TABLET ORAL at 09:12

## 2018-11-16 RX ADMIN — DOCUSATE SODIUM 100 MG: 100 CAPSULE, LIQUID FILLED ORAL at 09:12

## 2018-11-16 RX ADMIN — IPRATROPIUM BROMIDE AND ALBUTEROL SULFATE 3 ML: .5; 3 SOLUTION RESPIRATORY (INHALATION) at 11:42

## 2018-11-16 NOTE — PROGRESS NOTES
Critical result of positive anaerobic blood cultures with gram positive cocci called to RN. Informed MD of result and that patient is not receiving antibiotics. MD states that 'the sample must be contaminated'. No orders received.

## 2018-11-16 NOTE — WOUND CARE
Wound VAC dressing changed to left knee. 6x8x0.2cm with granulation and epithelization 5% at some edges noted. Aura wound is darkened. Foam dressing over skin tears below wound with transparent film from vac over. Will monitor.

## 2018-11-16 NOTE — PROGRESS NOTES
Problem: Self Care Deficits Care Plan (Adult) Goal: *Acute Goals and Plan of Care (Insert Text) 1. Pt will increase right shoulder AROM to 130 degrees for increased function in self care. 2.  Pt will stand at mirror and groom self with supervision Timeframe:  3 visits OCCUPATIONAL THERAPY: Initial Assessment, Daily Note, Treatment Day: Day of Assessment and AM 11/16/2018 INPATIENT: Hospital Day: 3 [place] / Product Type: Medicare /  
  
NAME/AGE/GENDER: Steven Escobar is a 77 y.o. female PRIMARY DIAGNOSIS:  SOB (shortness of breath) Acute exacerbation of CHF (congestive heart failure) ([facility]) Acute exacerbation of CHF (congestive heart failure) ([facility]) Acute exacerbation of CHF (congestive heart failure) ([facility] ICD-10: Treatment Diagnosis:  
 · Generalized Muscle Weakness (M62.81) · Other lack of cordination (R27.8) Precautions/Allergies:  Fall risk due to tubes and poor vision. Right shoulder with decreased range and tightness Sulfa (sulfonamide antibiotics) and Aspirin ASSESSMENT:  
 
Ms. Veronica Salas presents sitting EOB with O2 on and surrounded by tubing. Pt states that she lives alone , but daughter helps her bathe for safety reasons and pt dresses and toilets herself. Daughter helps with errands   Pt has impaired vision and no longer drives. Pt receives some sort of meals prepared for diabetics, but not MOW that she is able to heat in the mircrowave. Pt able to stand with supervision without rollator. Pt has learned to dress UB by putting the arm limited in shoulderr range in first.  Pt able to easily reach her feet. AROM in right shoulder with barely 25 degrees of flexion. AROM in left UE is functional.  UB strength is functional. Fingers on left hand seem out of joint and held in ulnar deviation and flexion. Pt states she is seeing a hand physician soon. Therapist stretched and ranged scapular and achieved almost 120 degrees of shoulder flexion. Daughter came in and was instructed in how to do this at home. Pt has pulleys at home and was urged to use them when she returns home, possible tomorrow. Pt functioning close to prior level of function and and family helps pt. Recommend possibly home therapy for further assessment of function and safety . This section established at most recent assessment PROBLEM LIST (Impairments causing functional limitations): 1. Decreased Strength 2. Decreased ADL/Functional Activities 3. Decreased Activity Tolerance 4. Increased Shortness of Breath INTERVENTIONS PLANNED: (Benefits and precautions of occupational therapy have been discussed with the patient.) 1. Activities of daily living training 2. Therapeutic activity 3. Therapeutic exercise TREATMENT PLAN: Frequency/Duration: Follow patient 3 X per weekto address above goals. Rehabilitation Potential For Stated Goals: Excellent RECOMMENDED REHABILITATION/EQUIPMENT: (at time of discharge pending progress): Due to the probability of continued deficits (see above) this patient will likely need continued skilled occupational therapy after discharge. Equipment:  
? None at this time. Pt has rollator walker and tub seat. OCCUPATIONAL PROFILE AND HISTORY:  
History of Present Injury/Illness (Reason for Referral): 
  See H and P Past Medical History/Comorbidities: Ms. Diaz  has a past medical history of Anticoagulated on Coumadin, CAD (coronary artery disease), Cardiomyopathy ([address] CHF (congestive heart failure) ([address] CKD (chronic kidney disease) stage 3, GFR 30-59 ml/min ([facility]), COPD (chronic obstructive pulmonary disease) ([address] Essential hypertension, benign, HLD (hyperlipidemia), ICD (implantable cardioverter-defibrillator) in place, Iron deficiency anemia due to chronic blood loss, MDS (myelodysplastic syndrome) ([facility] REJI (obstructive sleep apnea)-cpap, Osteoarthritis, Osteopenia, Pulmonary hypertension ([facility] Quadrantanopia, Skin defect, and Visual complaint. Ms. Diaz  has a past surgical history that includes cardiac catheterization (); ir bx bone marrow diagnostic (2011); hx aortic valve replacement (, ); hx  section; hx tubal ligation; hx heart catheterization (); hx coronary stent placement (May, 2014); hx pacemaker (10/2/2014); hx endoscopy (2009); IMPLANTATION OF CARDIOVERTER DEFIBRILLATOR (N/A, 10/2/2014); BRONCHOSCOPY (N/A, 2014); and ANESTHESIA FOR COLONOSCOPY AND ESOPHAGOGASTRODUODENOSCOPY (N/A, 2012). Social History/Living Environment:  
Home Environment: Private residence # Steps to Enter: 0 One/Two Story Residence: One story Living Alone: Yes Support Systems: Family member(s) Patient Expects to be Discharged to[de-identified] Private residence Current DME Used/Available at Home: [employer], rollator Prior Level of Function/Work/Activity: Pt lived at home alone with daughter and family support. Daughter helped pt bathe, but when pt alone she was able to manage. Pt received pre-made meals that were easy for her to microwave. Dominant Side:  
      RIGHT Number of Personal Factors/Comorbidities that affect the Plan of Care: Expanded review of therapy/medical records (1-2):  MODERATE COMPLEXITY ASSESSMENT OF OCCUPATIONAL PERFORMANCE[de-identified]  
Activities of Daily Living:  
Basic ADLs (From Assessment) Complex ADLs (From Assessment) Feeding: Independent Oral Facial Hygiene/Grooming: Setup Bathing: Minimum assistance(daughter helps with bathing at home due to safety isssues) Upper Body Dressing: Supervision Lower Body Dressing: Supervision Toileting: Supervision Grooming/Bathing/Dressing Activities of Daily Living Cognitive Retraining Safety/Judgement: Awareness of environment; Fall prevention Bed/Mat Mobility Sit to Stand: Supervision Most Recent Physical Functioning:  
Gross Assessment: 
AROM: Grossly decreased, non-functional(right shoudler fairly frozen. Left WFL and functiional) Strength: Within functional limits Coordination: Within functional limits Posture: 
  
Balance: 
Sitting: Intact Standing: Intact Bed Mobility: 
  
Wheelchair Mobility: 
  
Transfers: 
Sit to Stand: Supervision Stand to Sit: Supervision Patient Vitals for the past 6 hrs: 
 BP BP Patient Position SpO2 O2 Flow Rate (L/min) Pulse 18 0725    3 l/min   
18 0730 132/75 At rest 97 % 3 l/min 68 Mental Status Neurologic State: Alert Orientation Level: Oriented X4 Cognition: Follows commands Perception: Appears intact Perseveration: No perseveration noted Safety/Judgement: Awareness of environment, Fall prevention Physical Skills Involved: 1. Range of Motion 2. Strength 3. Activity Tolerance 4. Vision Cognitive Skills Affected (resulting in the inability to perform in a timely and safe manner): 1. NA Psychosocial Skills Affected: 1. Habits/Routines 2. Social Roles Number of elements that affect the Plan of Care: 3-5:  MODERATE COMPLEXITY CLINICAL DECISION MAKIN [place] [id number] AM-PAC 6 Clicks Daily Activity Inpatient Short Form How much help from another person does the patient currently need. .. Total A Lot A Little None 1. Putting on and taking off regular lower body clothing? [] 1   [] 2   [x] 3   [] 4  
2. Bathing (including washing, rinsing, drying)? [] 1   [] 2   [x] 3   [] 4  
3. Toileting, which includes using toilet, bedpan or urinal?   [] 1   [] 2   [x] 3   [] 4  
4. Putting on and taking off regular upper body clothing? [] 1   [] 2   [] 3   [x] 4  
5. Taking care of personal grooming such as brushing teeth? [] 1   [] 2   [] 3   [x] 4  
6. Eating meals? [] 1   [] 2   [] 3   [x] 4 © 2007, Trustees of [facility] [employer], under license to [employer]. All rights reserved Score:  Initial: 21 Most Recent: X (Date: -- ) Interpretation of Tool:  Represents activities that are increasingly more difficult (i.e. Bed mobility, Transfers, Gait). Score 24 23 22-20 19-15 14-10 9-7 6 Modifier CH CI CJ CK CL CM CN   
 
? Self Care:  
  - CURRENT STATUS: CJ - 20%-39% impaired, limited or restricted  - GOAL STATUS: CI - 1%-19% impaired, limited or restricted  - D/C STATUS:  CI - 1%-19% impaired, limited or restricted [place] / Product Type: Medicare /   
 
Medical Necessity:    
· Patient demonstrates good rehab potential due to higher previous functional level. Reason for Services/Other Comments: 
· Patient continues to require skilled intervention due to medical complications. Use of outcome tool(s) and clinical judgement create a POC that gives a: MODERATE COMPLEXITY  
 
 
 
TREATMENT:  
(In addition to Assessment/Re-Assessment sessions the following treatments were rendered) Pre-treatment Symptoms/Complaints:   
Pain: Initial:  
Pain Intensity 1: 0  Post Session:  0 Therapeutic Exercise: ( 12 min):  Manipulated pt's shoulder and scapular and achieved more range, up to almost 120 degrees. Daughter instructed in exercises. Braces/Orthotics/Lines/Etc:  
· IV · wound vac · O2 Device: Nasal cannula(placed on home triolgy for sleep) Treatment/Session Assessment:   
· Response to Treatment:  Pt pleasant and cooperative · Interdisciplinary Collaboration:  
o Occupational Therapist 
o Registered Nurse · After treatment position/precautions:  
o Up in chair 
o Bed in low position 
o Family at bedside · Compliance with Program/Exercises: Compliant all of the time. · Recommendations/Intent for next treatment session: \"Next visit will focus on advancements to more challenging activities\". Total Treatment Duration: OT Patient Time In/Time Out Time In: 8782 Time Out: 1020 Batool Perone, OT

## 2018-11-16 NOTE — PROGRESS NOTES
Problem: Mobility Impaired (Adult and Pediatric) Goal: *Acute Goals and Plan of Care (Insert Text) Discharge Goals: 
(1.)Ms. Diaz will move from supine to sit and sit to supine , scoot up and down and roll side to side in bed with MODIFIED INDEPENDENCE within 7 treatment day(s). (2.)Ms. Diaz will transfer from bed to chair and chair to bed with INDEPENDENT using the least restrictive device within 7 treatment day(s). (3.)Ms. Diaz will ambulate with INDEPENDENT for 250+ feet with stable vital signs with the least restrictive device within 7 treatment day(s). (4.)Ms. Diaz will tolerate 25+ minutes of therapeutic activity/exercise to improve activity tolerance within 7 treatment days. ________________________________________________________________________________________________ PHYSICAL THERAPY: Daily Note, Discharge, Treatment Day: 1st, PM 11/16/2018 INPATIENT: Hospital Day: 3 [place] / Product Type: Medicare /  
  
NAME/AGE/GENDER: Stacie Lawrence is a 77 y.o. female PRIMARY DIAGNOSIS: SOB (shortness of breath) Acute exacerbation of CHF (congestive heart failure) ([facility]) Acute exacerbation of CHF (congestive heart failure) ([facility]) Acute exacerbation of CHF (congestive heart failure) ([facility] ICD-10: Treatment Diagnosis: · Difficulty in walking, Not elsewhere classified (R26.2) · History of falling (Z91.81) Precaution/Allergies: 
Sulfa (sulfonamide antibiotics) and Aspirin ASSESSMENT:  
 
Ms. Diaz is sitting up in the chair on her chronic 3L and is happy to walk with me. She stood and walked the whole floor with her rollator without issue. She has met goals and is hoping to return home tomorrow. This section established at most recent assessment PROBLEM LIST (Impairments causing functional limitations): 1. Decreased Ambulation Ability/Technique 2. Decreased Activity Tolerance 3. Decreased Pacing Skills 4. Increased Fatigue 5. Increased Shortness of Breath 6. Decreased Ovalo with Home Exercise Program 
 INTERVENTIONS PLANNED: (Benefits and precautions of physical therapy have been discussed with the patient.) 1. Balance Exercise 2. Bed Mobility 3. Family Education 4. Gait Training 5. Home Exercise Program (HEP) 6. Manual Therapy 7. Neuromuscular Re-education/Strengthening 8. Range of Motion (ROM) 9. Therapeutic Activites 10. Therapeutic Exercise/Strengthening 11. Transfer Training TREATMENT PLAN: Frequency/Duration: 3 times a week for duration of hospital stay Rehabilitation Potential For Stated Goals: Good RECOMMENDED REHABILITATION/EQUIPMENT: (at time of discharge pending progress): Due to the probability of continued deficits (see above) this patient will likely need continued skilled physical therapy after discharge. Equipment:  
? None at this time HISTORY:  
History of Present Injury/Illness (Reason for Referral): Ms. Diaz is a 78 yo female with PMH of CAD, cardiomyopathy, CKD stage 3, COPD on home trilogy at night, 3 L O2 daytime, ICD, Myelodysplastic syndrome, iron deficiency anemia, HTN, hyperlipidemia, pulmonary HTN, systolic CHF EF 27-63% 4210, mechanical aortic valve who presented with c/o 3-4 day hx of increasingly worsening SOB, worse with exertion. She denies fever, chills, recent weight gain, edema, CP, sick contacts, cough. CXR showed no acute findings. . Lactic acid, PCT normal range. Influenza neg. Pt received lasix IV and steroids in the ED. 
  
 
 
Past Medical History/Comorbidities: Ms. Diaz  has a past medical history of Anticoagulated on Coumadin, CAD (coronary artery disease), Cardiomyopathy ([address] CHF (congestive heart failure) ([address] CKD (chronic kidney disease) stage 3, GFR 30-59 ml/min ([address] COPD (chronic obstructive pulmonary disease) ([address] Essential hypertension, benign, HLD (hyperlipidemia), ICD (implantable cardioverter-defibrillator) in place, Iron deficiency anemia due to chronic blood loss, MDS (myelodysplastic syndrome) ([facility] REJI (obstructive sleep apnea)-cpap, Osteoarthritis, Osteopenia, Pulmonary hypertension ([facility] Quadrantanopia, Skin defect, and Visual complaint. Ms. Dorothy Rocha  has a past surgical history that includes cardiac catheterization (); ir bx bone marrow diagnostic (2011); hx aortic valve replacement (, ); hx  section; hx tubal ligation; hx heart catheterization (); hx coronary stent placement (May, 2014); hx pacemaker (10/2/2014); hx endoscopy (2009); IMPLANTATION OF CARDIOVERTER DEFIBRILLATOR (N/A, 10/2/2014); BRONCHOSCOPY (N/A, 2014); and ANESTHESIA FOR COLONOSCOPY AND ESOPHAGOGASTRODUODENOSCOPY (N/A, 2012). Social History/Living Environment:  
Home Environment: Private residence # Steps to Enter: 0 One/Two Story Residence: One story Living Alone: Yes Support Systems: Family member(s) Patient Expects to be Discharged to[de-identified] Private residence Current DME Used/Available at Home: Reggy Hem, rollator Prior Level of Function/Work/Activity: 
Pt states she lives alone, has a wound vac on her L knee due to one recent fall when she \"missed a step\" and came down on her knee, ambulates independently normally but has been using a rollator for the last week to carry her wound vac, is on 3L supplemental O2 at all times, and has family close by if she needs anything. Number of Personal Factors/Comorbidities that affect the Plan of Care: 3+: HIGH COMPLEXITY EXAMINATION:  
Most Recent Physical Functioning:  
Gross Assessment: 
  
         
  
Posture: 
  
Balance: 
  Bed Mobility: 
  
Wheelchair Mobility: 
  
Transfers: 
Sit to Stand: Modified independent Stand to Sit: Modified independent Gait: 
  
Distance (ft): 500 Feet (ft) Assistive Device: Walker, rollator Ambulation - Level of Assistance: Modified independent Body Structures Involved: 1. Heart 2. Lungs 3. Bones 4. Joints 5. Muscles 6. Ligaments Body Functions Affected: 1. Cardio 2. Respiratory 3. Neuromusculoskeletal 
4. Movement Related Activities and Participation Affected: 1. Mobility 2. Domestic Life 3. Interpersonal Interactions and Relationships 4. Community, Social and [place] Number of elements that affect the Plan of Care: 4+: HIGH COMPLEXITY CLINICAL PRESENTATION:  
Presentation: Stable and uncomplicated: LOW COMPLEXITY CLINICAL DECISION MAKING:  
[facility] AM-PAC 6 Clicks Basic Mobility Inpatient Short Form How much difficulty does the patient currently have. .. Unable A Lot A Little None 1. Turning over in bed (including adjusting bedclothes, sheets and blankets)? [] 1   [] 2   [] 3   [x] 4  
2. Sitting down on and standing up from a chair with arms ( e.g., wheelchair, bedside commode, etc.)   [] 1   [] 2   [] 3   [x] 4  
3. Moving from lying on back to sitting on the side of the bed? [] 1   [] 2   [] 3   [x] 4 How much help from another person does the patient currently need. .. Total A Lot A Little None 4. Moving to and from a bed to a chair (including a wheelchair)? [] 1   [] 2   [x] 3   [] 4  
5. Need to walk in hospital room? [] 1   [] 2   [x] 3   [] 4  
6. Climbing 3-5 steps with a railing? [] 1   [x] 2   [] 3   [] 4  
© 2007, Trustees of [facility], under license to [employer]. All rights reserved Score:  Initial: 20 Most Recent: X (Date: -- ) Interpretation of Tool:  Represents activities that are increasingly more difficult (i.e. Bed mobility, Transfers, Gait). Score 24 23 22-20 19-15 14-10 9-7 6 Modifier CH CI CJ CK CL CM CN   
 
? Mobility - Walking and Moving Around:  
  - CURRENT STATUS: CJ - 20%-39% impaired, limited or restricted  - GOAL STATUS: CI - 1%-19% impaired, limited or restricted  - D/C STATUS:  ---------------To be determined--------------- [place] / [place] / Product Type: Medicare /   
 
Medical Necessity:    
· Patient demonstrates good rehab potential due to higher previous functional level. Reason for Services/Other Comments: 
· Patient continues to require skilled intervention due to impaired activity tolerance and functional mobility. Use of outcome tool(s) and clinical judgement create a POC that gives a: Clear prediction of patient's progress: LOW COMPLEXITY  
  
 
 
 
TREATMENT:  
(In addition to Assessment/Re-Assessment sessions the following treatments were rendered) Pre-treatment Symptoms/Complaints:  \"this feels good\" Pain: Initial:  
Pain Intensity 1: 0  Post Session:  0/10 I Therapeutic Activity: (    15 minutes): Therapeutic activities including Chair transfers and Ambulation on level ground to improve mobility, strength, balance and endurance. Required minimal   to promote static and dynamic balance in standing with use of her rollator Date: 
11/15/2018 Date: 
 Date: Activity/Exercise Parameters Parameters Parameters STS with RW for support 4x Calf raises with RW for support 10 B x 2 Standing marching with RW for support 10 B x 2 Braces/Orthotics/Lines/Etc:  
· medina catheter · wounds vac · O2 Device: Nasal cannula Treatment/Session Assessment:   
· Response to Treatment:  See above · Interdisciplinary Collaboration:  
o Physical Therapy Assistant 
o Registered Nurse · After treatment position/precautions:  
o Up in chair 
o Bed/Chair-wheels locked 
o Bed in low position 
o Call light within reach · Compliance with Program/Exercises: Compliant all of the time · Recommendations/Intent for next treatment session: NA Total Treatment Duration: PT Patient Time In/Time Out Time In: 5164 Time Out: 1400 Annette Carcamo, PTA

## 2018-11-16 NOTE — PROGRESS NOTES
[facility] [place] CARDIOLOGY PROGRESS NOTE 
      
 
11/16/2018 7:49 AM 
 
Admit Date: 11/14/2018 Subjective:  
Breathing improved. HTN improved with increasing coreg and entresto, echo yesterday essentially unchanged from previous assessments. meds appropriate. ROS: 
Cardiovascular:  As noted above Objective:  
  
Vitals:  
 11/16/18 0355 11/16/18 0400 11/16/18 0553 11/16/18 0730 BP: 125/81   132/75 Pulse: 68 67  68 Resp: 18   19 Temp: 98.6 °F (37 °C)   98.2 °F (36.8 °C) SpO2: 97%   97% Weight:   94.3 kg (208 lb) Height:      
 
 
Physical Exam: 
General-No Acute Distress Neck- supple, no JVD 
CV- regular rate and rhythm no MRG Lung- clear bilaterally Abd- soft, nontender, nondistended Ext- no edema bilaterally. Skin- warm and dry Data Review:  
Recent Labs 11/16/18 
0622 11/15/18 
0236  141  
K 3.8 4.7 MG  --  2.0  
BUN 39* 27* CREA 1.67* 1.39* * 169* WBC 10.9 4.5 HGB 11.1* 9.8* HCT 35.4* 31.5*  212 INR 2.3 2.3 Assessment/Plan:  
 
Principal Problem: 
  Acute exacerbation of CHF (congestive heart failure) ([facility] (11/14/2018) SHF, acute on chronic, will switch to PO lasix. entresto and coreg increased yesterday with improved BP control. Active Problems: 
  SOB (shortness of breath) (11/14/2018) Paige Gomez NP 
11/16/2018 7:49 AM

## 2018-11-16 NOTE — PROGRESS NOTES
Attempted to see patient three times this morning. Members of interdisciplinary team at the bedside each time. Will attempt to see as time allows.

## 2018-11-16 NOTE — ROUTINE PROCESS
CHF teaching completed to pt/ daughter( RN) , verbalize emphasis on monitoring self and report to MD: 
· If you gain 2 lbs in one day or 5 lbs in a week, and short of breath. · If you can not lay flat without developing short of breath or rapid breathing at night; or if it wakes you up. Develop a cough or wheezing. · If you notice swollen hands/feet/ankles or stomach with a bloated/ full feeling. · If you are  more confused or mentally fuzzy or dizzy. · If you notice a rapid or change in your heart rate. · If you become more exhausted all the time and unable to do the same level of activity without stopping to catch your breath. Drink no more than 8 cups a day in 8 oz. cups. Limit Cola Drinks. Your Heart can not handle any more. Stay away from salt (limit anything with salt or sodium in it). Limit to 250mg per serving. Exercise needs to be started with your Doctors approval. 
Reduce stress; Call myself or Provider if assistance is needed. Pass post test via teach back, will make self available post DC ,if an questions arise. Diabetic teaching completed.  Planner/scale @ BS:  60 mins total

## 2018-11-16 NOTE — PROGRESS NOTES
Patient home trilogy was placed on this evening with 3 liters bled in. After being on trilogy for 10 minutes, patient became anxious and wanted to be placed back on nasal cannula due to the amount of breaths per minute machine was providing. At this time patient is stable and comfortable on nasal cannula.

## 2018-11-16 NOTE — PROGRESS NOTES
Progress Note Patient: Eron Rhodes               Sex: female          MRN: [id number] YOB: 1950      Age:  77 y.o. PCP:  Nicky Merlos MD 
Treatment Team: Attending Provider: Tahira Carl MD; Consulting Provider: Radha Gonzalez MD; Utilization Review: Amalia High; Care Manager: Irasema Madrid Subjective:  
 
68-year-old lady with multiple medical problems including hypertension, COPD, severe systolic CHF with left ventricular ejection fraction of 25-30%, mechanical aortic valve, CKD stage III, obesity came to emergency room with complaints of increasing shortness of breath over the last 2 days. Shortness of breath was severe, worse with minimal exertion, not resolved with rest.  The pain is worse with lying flat, sitting up. No fevers or chills. No cough or wheezing. No abdominal pain or diarrhea or burning urination. Lactic acid,  pro calcitonin, WBC count are normal.  Influenza screen is negative. 
  
2018: Pt seen and examined. She was sitting at the side of the bed. Reports breathing improved, however, continues to feel sob and wheezing and reports she feels not ready to go home yet. No fever, chills, CP, abd pain, N/V or urinary symptoms. Objective:  
Physical Exam:  
Visit Vitals BP 98/57 (BP 1 Location: Left arm, BP Patient Position: At rest) Pulse 82 Temp 98.4 °F (36.9 °C) Resp 20 Ht 5' 2\" (1.575 m) Wt 94.3 kg (208 lb) SpO2 97% Breastfeeding? No  
BMI 38.04 kg/m² Temp (24hrs), Av.4 °F (36.9 °C), Min:98.1 °F (36.7 °C), Max:98.9 °F (37.2 °C) Oxygen Therapy O2 Sat (%): 97 % (18 1504) Pulse via Oximetry: 71 beats per minute (18 1504) O2 Device: Nasal cannula (18 1504) O2 Flow Rate (L/min): 3 l/min (18 1504) FIO2 (%): 32 % (11/15/18 2235) Intake/Output Summary (Last 24 hours) at 2018 1625 Last data filed at 2018 1502 Gross per 24 hour Intake 585 ml Output 1425 ml Net -840 ml General:          Conscious, no acute distress Eyes:               ANMOL, No pallor/icterus HENT:             Oral Mucosa is Moist 
Neck:               Supple, elevated JVP Lungs:             Diminished air entry bilaterally with basal crackles+, No significant wheeze/rhonchi Heart:              S1 S2 regular Abdomen:        Soft, Normal bowel sounds, NTND, No guarding/rigidity/rebound tend. Extremities:     Pedal edema+ Neurologic:      AAOX3, No acute FND, Motor:  LUE: 5/5, LLE: 5/5, RUE: 5/5, RLE: 5/5 Skin:                No acute rashes Musculoskeletal: No Acute findings. . Left leg wound vac in place. Psych:             Appropriate mood, thought process is normal 
  
 
LAB Recent Results (from the past 24 hour(s)) GLUCOSE, POC Collection Time: 11/15/18  4:52 PM  
Result Value Ref Range Glucose (POC) 120 (H) 65 - 100 mg/dL MAGNESIUM Collection Time: 11/16/18  6:00 AM  
Result Value Ref Range Magnesium 2.0 1.8 - 2.4 mg/dL PHOSPHORUS Collection Time: 11/16/18  6:00 AM  
Result Value Ref Range Phosphorus 3.9 (H) 2.3 - 3.7 MG/DL PROTHROMBIN TIME + INR Collection Time: 11/16/18  6:22 AM  
Result Value Ref Range Prothrombin time 24.5 (H) 11.5 - 14.5 sec INR 2.3    
CBC WITH AUTOMATED DIFF Collection Time: 11/16/18  6:22 AM  
Result Value Ref Range WBC 10.9 4.3 - 11.1 K/uL  
 RBC 3.90 (L) 4.05 - 5.2 M/uL  
 HGB 11.1 (L) 11.7 - 15.4 g/dL HCT 35.4 (L) 35.8 - 46.3 % MCV 90.8 79.6 - 97.8 FL  
 MCH 28.5 26.1 - 32.9 PG  
 MCHC 31.4 31.4 - 35.0 g/dL  
 RDW 15.5 % PLATELET 701 174 - 615 K/uL MPV 11.0 9.4 - 12.3 FL ABSOLUTE NRBC 0.00 0.0 - 0.2 K/uL  
 DF AUTOMATED NEUTROPHILS 75 43 - 78 % LYMPHOCYTES 17 13 - 44 % MONOCYTES 6 4.0 - 12.0 % EOSINOPHILS 1 0.5 - 7.8 % BASOPHILS 0 0.0 - 2.0 % IMMATURE GRANULOCYTES 0 0.0 - 5.0 %  
 ABS. NEUTROPHILS 8.2 1.7 - 8.2 K/UL ABS. LYMPHOCYTES 1.9 0.5 - 4.6 K/UL  
 ABS. MONOCYTES 0.7 0.1 - 1.3 K/UL  
 ABS. EOSINOPHILS 0.1 0.0 - 0.8 K/UL  
 ABS. BASOPHILS 0.0 0.0 - 0.2 K/UL  
 ABS. IMM. GRANS. 0.0 0.0 - 0.5 K/UL METABOLIC PANEL, BASIC Collection Time: 11/16/18  6:22 AM  
Result Value Ref Range Sodium 140 136 - 145 mmol/L Potassium 3.8 3.5 - 5.1 mmol/L Chloride 98 98 - 107 mmol/L  
 CO2 37 (H) 21 - 32 mmol/L Anion gap 5 (L) 7 - 16 mmol/L Glucose 127 (H) 65 - 100 mg/dL BUN 39 (H) 8 - 23 MG/DL Creatinine 1.67 (H) 0.6 - 1.0 MG/DL  
 GFR est AA 39 (L) >60 ml/min/1.73m2 GFR est non-AA 32 (L) >60 ml/min/1.73m2 Calcium 9.6 8.3 - 10.4 MG/DL  
TSH 3RD GENERATION Collection Time: 11/16/18  6:22 AM  
Result Value Ref Range TSH 2.620 0.358 - 3.740 uIU/mL GLUCOSE, POC Collection Time: 11/16/18 11:03 AM  
Result Value Ref Range Glucose (POC) 285 (H) 65 - 100 mg/dL GLUCOSE, POC Collection Time: 11/16/18  2:24 PM  
Result Value Ref Range Glucose (POC) 151 (H) 65 - 100 mg/dL GLUCOSE, POC Collection Time: 11/16/18  3:43 PM  
Result Value Ref Range Glucose (POC) 138 (H) 65 - 100 mg/dL No results found. No results found. All Micro Results Procedure Component Value Units Date/Time CULTURE, BLOOD [[id number]] Collected:  11/14/18 1159 Order Status:  Completed Specimen:  Blood Updated:  11/16/18 2598 Special Requests: --     
  RIGHT 
ARM 
  
  GRAM STAIN    
  GRAM POS COCCI IN CLUSTERS  
     
   ANAEROBIC BOTTLE POSITIVE RESULTS VERIFIED, PHONED TO AND READ BACK BY Fish Keane @ 7188 ON 11/15/18 BY NKE. Culture result:    
  CULTURE IN PROGRESS,FURTHER UPDATES TO FOLLOW CULTURE, BLOOD [[id number]] Collected:  11/14/18 1036 Order Status:  Completed Specimen:  Blood Updated:  11/16/18 6367 Special Requests: --     
  LEFT Antecubital 
  
  Culture result: NO GROWTH 2 DAYS INFLUENZA A & B AG (RAPID TEST) [[id number]] Collected:  11/14/18 1138 Order Status:  Completed Specimen:  Nasopharyngeal from Nasal washing Updated:  11/14/18 1158 Influenza A Ag NEGATIVE Comment: NEGATIVE FOR THE PRESENCE OF INFLUENZA A ANTIGEN 
INFECTION DUE TO INFLUENZA A CANNOT BE RULED OUT. BECAUSE THE ANTIGEN PRESENT IN THE SAMPLE MAY BE BELOW 
THE DETECTION LIMIT OF THE TEST. A NEGATIVE TEST IS PRESUMPTIVE AND IT IS RECOMMENDED THAT THESE RESULTS BE CONFIRMED BY VIRAL CULTURE OR AN FDA-CLEARED INFLUENZA A AND B MOLECULAR ASSAY. Influenza B Ag NEGATIVE Comment: NEGATIVE FOR THE PRESENCE OF INFLUENZA B ANTIGEN 
INFECTION DUE TO INFLUENZA B CANNOT BE RULED OUT. BECAUSE THE ANTIGEN PRESENT IN THE SAMPLE MAY BE BELOW 
THE DETECTION LIMIT OF THE TEST. A NEGATIVE TEST IS PRESUMPTIVE AND IT IS RECOMMENDED THAT THESE RESULTS BE CONFIRMED BY VIRAL CULTURE OR AN FDA-CLEARED INFLUENZA A AND B MOLECULAR ASSAY. Source NASOPHARYNGEAL Current Medications Reviewed Assessment/Plan 1. Acute on chronic hypoxemic and hypercapnic respiratory failure. Likely secondary to volume overload from acute CHF exacerbation. Obesity, sleep apnea, COPD contributing also. 
  
2. Acute on chronic systolic CHF exacerbation 3. Hypertensive urgency - ctn current meds. BP improving. use hydralazine as needed 4. Chronic paroxysmal A. fib, prosthetic mechanical aortic valve replacement, anticoagulation with Coumadin INR seems to be therapeutic with a goal of 2-3. 
5.  CKD stage III, monitor creatinine closely as patient is on diuretics. 6.  Anemia of chronic disease/MDS, monitor hemoglobin and transfuse as needed. 7.  Chronic COPD does not appear to be in any acute exacerbation. We will keep her on bronchodilators. Received 1 dose of Solu-Medrol in the emergency room. No further steroids for now. 8.  Obstructive sleep apnea, keep her on BiPAP at night. 9. Morbid obesity 10. CAD status post stent,  ischemic cardiomyopathy status post ICD/PPM. 11.  Pulmonary hypertension 12. Left leg wound, on wound VAC, consulted wound care. 13. Debility - PT/OT consult 14. GERD - Start Protonix. 
  
Ctn IV Lasix, monitor daily weights, creatinine, electrolytes, urine output. Appreciate cardiology recomm. DVT Prophylaxis: Warfarin Risk: High-risk patient for worsening respiratory failure and also cardiac complications. Nini Frost MD 
November 16, 2018

## 2018-11-16 NOTE — PROGRESS NOTES
Warfarin dosing per pharmacist 
 
Belén Rodriguez Anish White is a 77 y.o. female. Indication:  s/p AVR Goal INR:  2-3 Home dose:  5 mg (5 mg x 1) on Fri; 2.5 mg (5 mg x 0.5) all other days Risk factors or significant drug interactions:  -- Other anticoagulants:  -- 
 
Daily Monitoring Date  INR     Warfarin dose HGB              Notes 11/14  2.1  2.5 mg  9.4 
11/15  2.3  2.5 mg  9.8 
11/16  2.3  2.5 mg  11.1 Pharmacy consulted to assist in Ms. Corral's coumadin dosing. Pt home dose per last anticoagulation note, is 5 mg on Friday and 2.5 mg every other day. INR within therapeutic range today. Continue warfarin 2.5 mg qhs to reflect home dose and continue to monitor. Daily INR ordered. Patient received 5 mg dose of warfarin at recent anticoag visit on 11/13. Will plan to give 5 mg on Fridays per plan starting next week since the patient has already received 5 mg dose this week and is at goal range. Pharmacy will continue to follow. Please call with any questions. Thank you, Karen Persons PharmD Candidate 2019

## 2018-11-16 NOTE — PROGRESS NOTES
Cardiac Rehab: Referral received for diagnosis of CHF. Cardiac rehab participation is appropriate for those with stable, chronic heart failure defined as patients with left ventricular ejection fraction of 35% or less and New York Heart Association (NYHA) class II to IV symptoms despite being on optimal heart failure therapy for at least six weeks. Stable patients are defined as patients who have not had a recent ( 6 weeks or less) or planned (6 weeks or less) major cardiovascular hospitalizations or procedures. Patient's EF=20-25%, CHF is chronic. Pt will be contacted after discharge if qualifying referral is obtained. Thank you, BRANDIN LeahyN, RN Cardiac Rehab Nurse Liaison

## 2018-11-16 NOTE — PROGRESS NOTES
Patient alert and oriented x4, in bed resting with family at bedside. Patient has no complaints at this time and is in no apparent distress on 3L NC. Ramirez draining and patent. IV patent and flushed. Wound vac in place to left knee to suction. Telemetry monitor in place. Door open, call light in reach. Will continue to monitor.

## 2018-11-17 LAB
ANION GAP SERPL CALC-SCNC: 10 MMOL/L (ref 7–16)
BACTERIA SPEC CULT: ABNORMAL
BACTERIA SPEC CULT: ABNORMAL
BUN SERPL-MCNC: 69 MG/DL (ref 8–23)
CALCIUM SERPL-MCNC: 8.8 MG/DL (ref 8.3–10.4)
CHLORIDE SERPL-SCNC: 95 MMOL/L (ref 98–107)
CO2 SERPL-SCNC: 33 MMOL/L (ref 21–32)
CREAT SERPL-MCNC: 3.07 MG/DL (ref 0.6–1)
GLUCOSE BLD STRIP.AUTO-MCNC: 121 MG/DL (ref 65–100)
GLUCOSE BLD STRIP.AUTO-MCNC: 144 MG/DL (ref 65–100)
GLUCOSE BLD STRIP.AUTO-MCNC: 187 MG/DL (ref 65–100)
GLUCOSE BLD STRIP.AUTO-MCNC: 219 MG/DL (ref 65–100)
GLUCOSE SERPL-MCNC: 142 MG/DL (ref 65–100)
GRAM STN SPEC: ABNORMAL
INR PPP: 2
POTASSIUM SERPL-SCNC: 4.5 MMOL/L (ref 3.5–5.1)
PROTHROMBIN TIME: 21.8 SEC (ref 11.5–14.5)
SERVICE CMNT-IMP: ABNORMAL
SODIUM SERPL-SCNC: 138 MMOL/L (ref 136–145)

## 2018-11-17 PROCEDURE — 74011000250 HC RX REV CODE- 250: Performed by: HOSPITALIST

## 2018-11-17 PROCEDURE — 3331090002 HH PPS REVENUE DEBIT

## 2018-11-17 PROCEDURE — 77010033678 HC OXYGEN DAILY

## 2018-11-17 PROCEDURE — 74750000023 HC WOUND THERAPY

## 2018-11-17 PROCEDURE — 82962 GLUCOSE BLOOD TEST: CPT

## 2018-11-17 PROCEDURE — 74011250637 HC RX REV CODE- 250/637: Performed by: NURSE PRACTITIONER

## 2018-11-17 PROCEDURE — 94640 AIRWAY INHALATION TREATMENT: CPT

## 2018-11-17 PROCEDURE — 85610 PROTHROMBIN TIME: CPT

## 2018-11-17 PROCEDURE — 3331090001 HH PPS REVENUE CREDIT

## 2018-11-17 PROCEDURE — 80048 BASIC METABOLIC PNL TOTAL CA: CPT

## 2018-11-17 PROCEDURE — 74011250637 HC RX REV CODE- 250/637: Performed by: INTERNAL MEDICINE

## 2018-11-17 PROCEDURE — 94760 N-INVAS EAR/PLS OXIMETRY 1: CPT

## 2018-11-17 PROCEDURE — 74011636637 HC RX REV CODE- 636/637: Performed by: INTERNAL MEDICINE

## 2018-11-17 PROCEDURE — 36415 COLL VENOUS BLD VENIPUNCTURE: CPT

## 2018-11-17 PROCEDURE — 74011250637 HC RX REV CODE- 250/637: Performed by: HOSPITALIST

## 2018-11-17 PROCEDURE — 97110 THERAPEUTIC EXERCISES: CPT

## 2018-11-17 PROCEDURE — 65660000000 HC RM CCU STEPDOWN

## 2018-11-17 PROCEDURE — 97140 MANUAL THERAPY 1/> REGIONS: CPT

## 2018-11-17 RX ORDER — WARFARIN SODIUM 5 MG/1
5 TABLET ORAL
Status: DISCONTINUED | OUTPATIENT
Start: 2018-11-23 | End: 2018-11-20 | Stop reason: HOSPADM

## 2018-11-17 RX ORDER — NYSTATIN 100000 [USP'U]/ML
500000 SUSPENSION ORAL 4 TIMES DAILY
Status: DISCONTINUED | OUTPATIENT
Start: 2018-11-17 | End: 2018-11-20 | Stop reason: HOSPADM

## 2018-11-17 RX ORDER — WARFARIN 2.5 MG/1
2.5 TABLET ORAL
Status: DISCONTINUED | OUTPATIENT
Start: 2018-11-18 | End: 2018-11-20 | Stop reason: HOSPADM

## 2018-11-17 RX ORDER — WARFARIN 2.5 MG/1
2.5 TABLET ORAL EVERY EVENING
Status: DISCONTINUED | OUTPATIENT
Start: 2018-11-18 | End: 2018-11-17

## 2018-11-17 RX ORDER — WARFARIN SODIUM 5 MG/1
5 TABLET ORAL ONCE
Status: COMPLETED | OUTPATIENT
Start: 2018-11-17 | End: 2018-11-17

## 2018-11-17 RX ORDER — PREDNISONE 20 MG/1
40 TABLET ORAL 2 TIMES DAILY WITH MEALS
Status: DISCONTINUED | OUTPATIENT
Start: 2018-11-17 | End: 2018-11-19

## 2018-11-17 RX ADMIN — ESCITALOPRAM OXALATE 20 MG: 10 TABLET ORAL at 09:07

## 2018-11-17 RX ADMIN — IPRATROPIUM BROMIDE AND ALBUTEROL SULFATE 3 ML: .5; 3 SOLUTION RESPIRATORY (INHALATION) at 11:34

## 2018-11-17 RX ADMIN — ACETAMINOPHEN 650 MG: 325 TABLET, FILM COATED ORAL at 17:15

## 2018-11-17 RX ADMIN — ACETAMINOPHEN 650 MG: 325 TABLET, FILM COATED ORAL at 05:43

## 2018-11-17 RX ADMIN — PREDNISONE 40 MG: 20 TABLET ORAL at 17:10

## 2018-11-17 RX ADMIN — PANTOPRAZOLE SODIUM 40 MG: 40 TABLET, DELAYED RELEASE ORAL at 05:42

## 2018-11-17 RX ADMIN — CARVEDILOL 12.5 MG: 12.5 TABLET, FILM COATED ORAL at 09:07

## 2018-11-17 RX ADMIN — DOCUSATE SODIUM 100 MG: 100 CAPSULE, LIQUID FILLED ORAL at 09:07

## 2018-11-17 RX ADMIN — SIMVASTATIN 20 MG: 20 TABLET, FILM COATED ORAL at 21:48

## 2018-11-17 RX ADMIN — IPRATROPIUM BROMIDE AND ALBUTEROL SULFATE 3 ML: .5; 3 SOLUTION RESPIRATORY (INHALATION) at 20:00

## 2018-11-17 RX ADMIN — BUDESONIDE 500 MCG: 0.5 INHALANT RESPIRATORY (INHALATION) at 20:00

## 2018-11-17 RX ADMIN — BUDESONIDE 500 MCG: 0.5 INHALANT RESPIRATORY (INHALATION) at 07:44

## 2018-11-17 RX ADMIN — IPRATROPIUM BROMIDE AND ALBUTEROL SULFATE 3 ML: .5; 3 SOLUTION RESPIRATORY (INHALATION) at 16:30

## 2018-11-17 RX ADMIN — NYSTATIN: 100000 POWDER TOPICAL at 09:07

## 2018-11-17 RX ADMIN — WARFARIN SODIUM 5 MG: 5 TABLET ORAL at 17:10

## 2018-11-17 RX ADMIN — IPRATROPIUM BROMIDE AND ALBUTEROL SULFATE 3 ML: .5; 3 SOLUTION RESPIRATORY (INHALATION) at 07:44

## 2018-11-17 RX ADMIN — NYSTATIN 500000 UNITS: 500000 SUSPENSION ORAL at 21:47

## 2018-11-17 RX ADMIN — ISOSORBIDE MONONITRATE 30 MG: 30 TABLET, EXTENDED RELEASE ORAL at 09:07

## 2018-11-17 RX ADMIN — INSULIN LISPRO 4 UNITS: 100 INJECTION, SOLUTION INTRAVENOUS; SUBCUTANEOUS at 16:30

## 2018-11-17 RX ADMIN — LORATADINE 10 MG: 10 TABLET ORAL at 09:07

## 2018-11-17 RX ADMIN — INSULIN LISPRO 2 UNITS: 100 INJECTION, SOLUTION INTRAVENOUS; SUBCUTANEOUS at 12:07

## 2018-11-17 NOTE — PROGRESS NOTES
Pt sitting up in chair. No distress noted at this time. Wound vac remains in place. Door open will monitor.

## 2018-11-17 NOTE — PROGRESS NOTES
Pt resting in bed with eyes closed. Pt alert oriented times 3 at this time. Pt complaints of generalized pain 5/10 from laying in bed. Pt on 3  L NC a this time. Pt has no acute distress noted at this time. Pt encouraged to call for assistance if needed call light in reach, door open will monitor.

## 2018-11-17 NOTE — PROGRESS NOTES
Problem: Self Care Deficits Care Plan (Adult) Goal: *Acute Goals and Plan of Care (Insert Text) 1. Pt will increase right shoulder AROM to 130 degrees for increased function in self care. 2.  Pt will stand at mirror and groom self with supervision Timeframe:  3 visits OCCUPATIONAL THERAPY: Daily Note, Treatment Day: 1st and AM 11/17/2018 INPATIENT: Hospital Day: 4 [place] / Product Type: Medicare /  
  
NAME/AGE/GENDER: Bruce Courser is a 77 y.o. female PRIMARY DIAGNOSIS:  SOB (shortness of breath) Acute exacerbation of CHF (congestive heart failure) ([facility]) Acute exacerbation of CHF (congestive heart failure) ([facility]) Acute exacerbation of CHF (congestive heart failure) ([facility] ICD-10: Treatment Diagnosis:  
 · Generalized Muscle Weakness (M62.81) · Other lack of cordination (R27.8) Precautions/Allergies:  Fall risk due to tubes and poor vision. Right shoulder with decreased range and tightness Sulfa (sulfonamide antibiotics) and Aspirin ASSESSMENT:  
 
 Pt seen sitting up in [facility] with wound vac  On . Pt states she is not sore from manual therapy yesterday and her shoulder and upper back feel better. Stretched shoulder and able to range shoulder to about 120 to 130 degrees before painful. Pt does c/o pain around rotator cuff. One area in the back  Along rotator cuff pain full for therapist to touch. Used manual therapy with warmth and pt then was able to allow touching to the area. External rotation very painful for therapist.  Pt hopes to go home soon. Pt urged to use some heat, then exercise the area. Per pain ratings by pt, pain decreasing . Addressed goal 1. This section established at most recent assessment PROBLEM LIST (Impairments causing functional limitations): 1. Decreased Strength 2. Decreased ADL/Functional Activities 3. Decreased Activity Tolerance 4. Increased Shortness of Breath INTERVENTIONS PLANNED: (Benefits and precautions of occupational therapy have been discussed with the patient.) 1. Activities of daily living training 2. Therapeutic activity 3. Therapeutic exercise TREATMENT PLAN: Frequency/Duration: Follow patient 3 X per weekto address above goals. Rehabilitation Potential For Stated Goals: Excellent RECOMMENDED REHABILITATION/EQUIPMENT: (at time of discharge pending progress): Due to the probability of continued deficits (see above) this patient will likely need continued skilled occupational therapy after discharge. Equipment:  
? None at this time. Pt has rollator walker and tub seat. OCCUPATIONAL PROFILE AND HISTORY:  
History of Present Injury/Illness (Reason for Referral): 
  See H and P Past Medical History/Comorbidities: Ms. Keila Lockhart  has a past medical history of Anticoagulated on Coumadin, CAD (coronary artery disease), Cardiomyopathy ([address] CHF (congestive heart failure) ([address] CKD (chronic kidney disease) stage 3, GFR 30-59 ml/min ([facility]), COPD (chronic obstructive pulmonary disease) ([address] Essential hypertension, benign, HLD (hyperlipidemia), ICD (implantable cardioverter-defibrillator) in place, Iron deficiency anemia due to chronic blood loss, MDS (myelodysplastic syndrome) ([address] REJI (obstructive sleep apnea)-cpap, Osteoarthritis, Osteopenia, Pulmonary hypertension ([address] Quadrantanopia, Skin defect, and Visual complaint. Ms. Keila Lockhart  has a past surgical history that includes cardiac catheterization (); ir bx bone marrow diagnostic (2011); hx aortic valve replacement (, ); hx  section; hx tubal ligation; hx heart catheterization (); hx coronary stent placement (May, 2014); hx pacemaker (10/2/2014); hx endoscopy (2009); IMPLANTATION OF CARDIOVERTER DEFIBRILLATOR (N/A, 10/2/2014); BRONCHOSCOPY (N/A, 2014); and ANESTHESIA FOR COLONOSCOPY AND ESOPHAGOGASTRODUODENOSCOPY (N/A, 2012). Social History/Living Environment:  
Home Environment: Private residence # Steps to Enter: 1 One/Two Story Residence: One story Living Alone: Yes Support Systems: Family member(s) Patient Expects to be Discharged to[de-identified] Private residence Current DME Used/Available at Home: darienl Bacon Prior Level of Function/Work/Activity: Pt lived at home alone with daughter and family support. Daughter helped pt bathe, but when pt alone she was able to manage. Pt received pre-made meals that were easy for her to microwave. Dominant Side:  
      RIGHT Number of Personal Factors/Comorbidities that affect the Plan of Care: Expanded review of therapy/medical records (1-2):  MODERATE COMPLEXITY ASSESSMENT OF OCCUPATIONAL PERFORMANCE[de-identified]  
Activities of Daily Living:  
Basic ADLs (From Assessment) Complex ADLs (From Assessment) Feeding: Independent Oral Facial Hygiene/Grooming: Setup Bathing: Minimum assistance(daughter helps with bathing at home due to safety isssues) Upper Body Dressing: Supervision Lower Body Dressing: Supervision Toileting: Supervision Grooming/Bathing/Dressing Activities of Daily Living Cognitive Retraining Safety/Judgement: Awareness of environment Most Recent Physical Functioning:  
Gross Assessment: 
AROM: Generally decreased, functional(Passive range to 120 degrees. Pt with pain ER.) Strength: Within functional limits Coordination: Within functional limits Posture: 
  
Balance: 
  Bed Mobility: 
  
Wheelchair Mobility: 
  
Transfers: 
   
 
    
 
Patient Vitals for the past 6 hrs: 
 BP SpO2 O2 Flow Rate (L/min) Pulse 11/17/18 0545  98 % 3 l/min   
11/17/18 0700 112/63 97 %  73 Mental Status Neurologic State: Alert Orientation Level: Oriented X4 Cognition: Appropriate decision making Perception: Appears intact Perseveration: No perseveration noted Safety/Judgement: Awareness of environment Physical Skills Involved: 1. Range of Motion 2. Strength 3. Activity Tolerance 4. Vision Cognitive Skills Affected (resulting in the inability to perform in a timely and safe manner): 1. NA Psychosocial Skills Affected: 1. Habits/Routines 2. Social Roles Number of elements that affect the Plan of Care: 3-5:  MODERATE COMPLEXITY CLINICAL DECISION MAKIN[address] [place] [address] AM-PAC 6 Clicks Daily Activity Inpatient Short Form How much help from another person does the patient currently need. .. Total A Lot A Little None 1. Putting on and taking off regular lower body clothing? [] 1   [] 2   [x] 3   [] 4  
2. Bathing (including washing, rinsing, drying)? [] 1   [] 2   [x] 3   [] 4  
3. Toileting, which includes using toilet, bedpan or urinal?   [] 1   [] 2   [x] 3   [] 4  
4. Putting on and taking off regular upper body clothing? [] 1   [] 2   [] 3   [x] 4  
5. Taking care of personal grooming such as brushing teeth? [] 1   [] 2   [] 3   [x] 4  
6. Eating meals? [] 1   [] 2   [] 3   [x] 4  
© , Trustees of [address] [place] [address], under license to [employer]. All rights reserved Score:  Initial: 21 Most Recent: X (Date: -- ) Interpretation of Tool:  Represents activities that are increasingly more difficult (i.e. Bed mobility, Transfers, Gait). Score 24 23 22-20 19-15 14-10 9-7 6 Modifier CH CI CJ CK CL CM CN   
 
? Self Care:  
  - CURRENT STATUS: CJ - 20%-39% impaired, limited or restricted  - GOAL STATUS: CI - 1%-19% impaired, limited or restricted  - D/C STATUS:  CI - 1%-19% impaired, limited or restricted [place] / [place] / Product Type: Medicare /   
 
Medical Necessity:    
· Patient demonstrates good rehab potential due to higher previous functional level. Reason for Services/Other Comments: 
· Patient continues to require skilled intervention due to medical complications. Use of outcome tool(s) and clinical judgement create a POC that gives a: MODERATE COMPLEXITY  
 
 
 
TREATMENT:  
(In addition to Assessment/Re-Assessment sessions the following treatments were rendered) Pre-treatment Symptoms/Complaints:   
Pain: Initial:  
Pain Intensity 1: 4 Pain Location 1: Shoulder, Back(right side) Pain Orientation 1: Right  Post Session:  2 to 3 Therapeutic Exercise: ( 12 min):  Manipulated pt's shoulder and scapular and achieved more range, up to almost 120 degrees to 130 degrees. Pt states pain has decreased from yesterday. Manual Therapy: Pt very tender along rotator cuff on back. Used manual technique and therapist able to touch the  Area. Pt continues to be unable to tolerate external rotation of right UE. Pt with limited use of shoulder and limited function. Haley Tavera Braces/Orthotics/Lines/Etc:  
· IV · wound vac · O2 Device: Nasal cannula(placed on home triolgy for sleep) Treatment/Session Assessment:   
· Response to Treatment:  Pt pleasant and cooperative · Interdisciplinary Collaboration:  
o Occupational Therapist 
o Registered Nurse · After treatment position/precautions:  
o Up in chair 
o Bed in low position 
o Family at bedside · Compliance with Program/Exercises: Compliant all of the time. · Recommendations/Intent for next treatment session: \"Next visit will focus on advancements to more challenging activities\". Total Treatment Duration: OT Patient Time In/Time Out Time In: 5679 Time Out: 9376 Lia Bhardwaj OT

## 2018-11-17 NOTE — PROGRESS NOTES
Patient requested pain med. Tylenol 650 mg po given per prn order. Awake, wearing 3L oxygen, discussing home situation and need for home health aides.

## 2018-11-17 NOTE — PROGRESS NOTES
Patient expresses concern that she has not received an injection of procrit this month as she has been ill and not been to her Dr. Charlotte Chua the dosage is determined by the blood work. Requests that it be checked out and given if possible.

## 2018-11-17 NOTE — PROGRESS NOTES
Tylenol 650 mg 2 tabs po given for complaints of headache 7/10 will monitor. Pt assisted to chair for dinner will monitor. Daughter at bedside.

## 2018-11-17 NOTE — PROGRESS NOTES
Patient resting quietly in bed, states she hopes to go home tomorrow. Wound vac intact and functioning. Ramirez draining clear yellow urine. Will continue to monitor.

## 2018-11-17 NOTE — PROGRESS NOTES
[facility] CARDIOLOGY PROGRESS NOTE 
 
11/17/2018 8:10 AM 
 
Admit Date: 11/14/2018 Admit Diagnosis: SOB (shortness of breath); Acute exacerbation of CHF (congestive heart failure) ([facility] Subjective:  
Stable overnight without angina, CHF, or palpitations. Breathing better but hypotensive after CHF meds adjusted, in acute renal failure on ARB and diuretics now. Holding/adjusting meds as noted below. Objective:  
  
Vitals:  
 11/17/18 0317 11/17/18 0515 11/17/18 0545 11/17/18 0700 BP: 95/56   112/63 Pulse: 81   73 Resp: 20   18 Temp: 97.7 °F (36.5 °C)   98 °F (36.7 °C) SpO2: 97%  98% 97% Weight:  94.1 kg (207 lb 8 oz) Height:      
 
 
Physical Exam: 
Neck- supple, 8cm JVD at 60 deg CV- regular rate and rhythm, soft ONEIL with crisp AVR click Lung- decreased bilaterally but no crackles or wheezing Abd- soft, nontender, nondistended Ext- trace edema Skin- warm and dry Data Review:  
Recent Labs 11/17/18 
0551 11/16/18 
0622 11/16/18 
0600 11/15/18 
0236  140  --  141  
K 4.5 3.8  --  4.7 MG  --   --  2.0 2.0 BUN 69* 39*  --  27* CREA 3.07* 1.67*  --  1.39* * 127*  --  169* WBC  --  10.9  --  4.5 HGB  --  11.1*  --  9.8* HCT  --  35.4*  --  31.5* PLT  --  252  --  212 INR 2.0 2.3  --  2.3 Assessment and Plan:  
 
Principal Problem: 
  Acute exacerbation of CHF (congestive heart failure) ([facility] (11/14/2018) Active Problems: 
  SOB (shortness of breath) (11/14/2018) Acute renal failure- hypotension, ARB and lasix on board- stop diuretics, hold entresto, hold coreg and imdur until systolic>100-110, recheck in AM 
 
Mechanical AVR- stable, continue warfarin, check INR in AM 
 
COPD- stable, no wheezing or SOB at present, follow clinically CBC, BMP, Mg in AM 
Stop ARB and lasix as above She requests her procrit injection, missed it on Tuesday, gets anemic without it. Defer dose and order to primary MD's.   
 
 
 
Maci Bedoya MD 
 [facility] Cardiology Pager [phone]

## 2018-11-17 NOTE — PROGRESS NOTES
Warfarin dosing per pharmacist 
 
Jose Antonio Amber Salguero is a 77 y.o. female. Indication:  s/p AVR Goal INR:  2-3 Home dose:  5 mg (5 mg x 1) on Fri; 2.5 mg (5 mg x 0.5) all other days Risk factors or significant drug interactions:  -- Other anticoagulants:  -- 
 
Daily Monitoring Date  INR     Warfarin dose HGB              Notes 11/14  2.1  2.5 mg  9.4 
11/15  2.3  2.5 mg  9.8 
11/16  2.3  2.5 mg  11.1 
11/17  2  5 mg  ---- Pharmacy consulted to assist in Ms. Corral's warfarin dosing and monitoring during this admission. Home dose per last anticoagulation note, is 2.5mg daily except on Fridays, take 5mg daily. INR within therapeutic range today. I will order 5 mg X1 to be given tonight and plan to resume home regimen as ordered. Pharmacy will continue to follow. Please call with any questions. Thank you, Dolores Mars, PharmD, BCCCP Clinical Pharmacist 
[phone]

## 2018-11-17 NOTE — PROGRESS NOTES
Progress Note Patient: Socorro Dangelo               Sex: female          MRN: [id number] YOB: 1950      Age:  77 y.o. PCP:  Josh Hitchcock MD 
Treatment Team: Attending Provider: Shaina Meraz MD; Consulting Provider: Lannie Koyanagi, MD; Utilization Review: Shasha Espinoza; Care Manager: [facility] Subjective:  
 
25-year-old lady with multiple medical problems including hypertension, COPD, severe systolic CHF with left ventricular ejection fraction of 25-30%, mechanical aortic valve, CKD stage III, obesity came to emergency room with complaints of increasing shortness of breath over the last 2 days. Shortness of breath was severe, worse with minimal exertion, not resolved with rest.  The pain is worse with lying flat, sitting up. No fevers or chills. No cough or wheezing. No abdominal pain or diarrhea or burning urination. Lactic acid,  pro calcitonin, WBC count are normal.  Influenza screen is negative. 
  
2018: Pt seen and examined. She was sitting at the side of the bed. Reports breathing improved, however, continues to feel sob and wheezing and reports she feels not ready to go home yet. No fever, chills, CP, abd pain, N/V or urinary symptoms. 2018: Pt seen and examined. She was lying in bed. Reports breathing improving but still sob and not back to her baseline. Otherwise, no fever, chills, CP, abd pain, N/V or urinary symptoms. Objective:  
Physical Exam:  
Visit Vitals /66 Pulse 79 Temp 98.3 °F (36.8 °C) Resp 18 Ht 5' 2\" (1.575 m) Wt 94.1 kg (207 lb 8 oz) SpO2 95% Breastfeeding? No  
BMI 37.95 kg/m² Temp (24hrs), Av.1 °F (36.7 °C), Min:97.7 °F (36.5 °C), Max:98.5 °F (36.9 °C) Oxygen Therapy O2 Sat (%): 95 % (18 1100) Pulse via Oximetry: 76 beats per minute (18 0545) O2 Device: CPAP mask (18 0545) O2 Flow Rate (L/min): 3 l/min (18 0545) FIO2 (%): 32 % (11/17/18 0545) Intake/Output Summary (Last 24 hours) at 11/17/2018 1143 Last data filed at 11/17/2018 1133 Gross per 24 hour Intake 820 ml Output 425 ml Net 395 ml General:          Conscious, no acute distress Eyes:               ANMOL, No pallor/icterus HENT:             Oral Mucosa is Moist 
Neck:               Supple, elevated JVP Lungs:             Diminished air entry bilaterally mild wheezing bilaterally. ; 
Heart:              S1 S2 regular Abdomen:        Soft, Normal bowel sounds, NTND, No guarding/rigidity/rebound tend. Extremities:     Pedal edema+ Neurologic:      AAOX3, No acute FND, Motor:  LUE: 5/5, LLE: 5/5, RUE: 5/5, RLE: 5/5 Skin:                No acute rashes Musculoskeletal: No Acute findings. . Left leg wound vac in place. Psych:             Appropriate mood, thought process is normal 
  
 
LAB Recent Results (from the past 24 hour(s)) GLUCOSE, POC Collection Time: 11/16/18  2:24 PM  
Result Value Ref Range Glucose (POC) 151 (H) 65 - 100 mg/dL GLUCOSE, POC Collection Time: 11/16/18  3:43 PM  
Result Value Ref Range Glucose (POC) 138 (H) 65 - 100 mg/dL PLEASE READ & DOCUMENT PPD TEST IN 48 HRS Collection Time: 11/16/18  6:52 PM  
Result Value Ref Range PPD neg Negative  
 mm Induration 0 mm GLUCOSE, POC Collection Time: 11/16/18  9:10 PM  
Result Value Ref Range Glucose (POC) 237 (H) 65 - 100 mg/dL GLUCOSE, POC Collection Time: 11/17/18  5:12 AM  
Result Value Ref Range Glucose (POC) 144 (H) 65 - 100 mg/dL PROTHROMBIN TIME + INR Collection Time: 11/17/18  5:51 AM  
Result Value Ref Range Prothrombin time 21.8 (H) 11.5 - 14.5 sec INR 2.0 METABOLIC PANEL, BASIC Collection Time: 11/17/18  5:51 AM  
Result Value Ref Range Sodium 138 136 - 145 mmol/L Potassium 4.5 3.5 - 5.1 mmol/L Chloride 95 (L) 98 - 107 mmol/L  
 CO2 33 (H) 21 - 32 mmol/L  Anion gap 10 7 - 16 mmol/L  
 Glucose 142 (H) 65 - 100 mg/dL BUN 69 (H) 8 - 23 MG/DL Creatinine 3.07 (H) 0.6 - 1.0 MG/DL  
 GFR est AA 19 (L) >60 ml/min/1.73m2 GFR est non-AA 16 (L) >60 ml/min/1.73m2 Calcium 8.8 8.3 - 10.4 MG/DL  
GLUCOSE, POC Collection Time: 11/17/18 11:06 AM  
Result Value Ref Range Glucose (POC) 187 (H) 65 - 100 mg/dL No results found. No results found. All Micro Results Procedure Component Value Units Date/Time CULTURE, BLOOD [[id number]]  (Abnormal) Collected:  11/14/18 1159 Order Status:  Completed Specimen:  Blood Updated:  11/17/18 4022 Special Requests: --     
  RIGHT 
ARM 
  
  GRAM STAIN    
  GRAM POS COCCI IN CLUSTERS  
     
   ANAEROBIC BOTTLE POSITIVE RESULTS VERIFIED, PHONED TO AND READ BACK BY Benedicto Munguia @ 9741 ON 11/15/18 BY NKE. Culture result: STAPHYLOCOCCUS SPECIES, COAGULASE NEGATIVE  
     
      
  THIS ORGANISM MAY BE INDICATIVE OF CULTURE CONTAMINATION, HOWEVER, CLINICAL CORRELATION NEEDS TO BE EVALUATED, AS EACH CASE IS UNIQUE. CULTURE, BLOOD [[id number]] Collected:  11/14/18 1034 Order Status:  Completed Specimen:  Blood Updated:  11/17/18 0331 Special Requests: --     
  LEFT Antecubital 
  
  Culture result: NO GROWTH 3 DAYS INFLUENZA A & B AG (RAPID TEST) [[id number]] Collected:  11/14/18 1138 Order Status:  Completed Specimen:  Nasopharyngeal from Nasal washing Updated:  11/14/18 1158 Influenza A Ag NEGATIVE Comment: NEGATIVE FOR THE PRESENCE OF INFLUENZA A ANTIGEN 
INFECTION DUE TO INFLUENZA A CANNOT BE RULED OUT. BECAUSE THE ANTIGEN PRESENT IN THE SAMPLE MAY BE BELOW 
THE DETECTION LIMIT OF THE TEST. A NEGATIVE TEST IS PRESUMPTIVE AND IT IS RECOMMENDED THAT THESE RESULTS BE CONFIRMED BY VIRAL CULTURE OR AN FDA-CLEARED INFLUENZA A AND B MOLECULAR ASSAY. Influenza B Ag NEGATIVE   Comment: NEGATIVE FOR THE PRESENCE OF INFLUENZA B ANTIGEN 
 INFECTION DUE TO INFLUENZA B CANNOT BE RULED OUT. BECAUSE THE ANTIGEN PRESENT IN THE SAMPLE MAY BE BELOW 
THE DETECTION LIMIT OF THE TEST. A NEGATIVE TEST IS PRESUMPTIVE AND IT IS RECOMMENDED THAT THESE RESULTS BE CONFIRMED BY VIRAL CULTURE OR AN FDA-CLEARED INFLUENZA A AND B MOLECULAR ASSAY. Source NASOPHARYNGEAL Current Medications Reviewed Assessment/Plan 1. Acute on chronic hypoxemic and hypercapnic respiratory failure. Likely secondary to COPD exacerbation worsened by CHF, Obesity, sleep apnea. 
  
2. Acute on chronic systolic CHF exacerbation. BNP 1600 on admission. Received Lasix IV then po. Now taken off lasix as her creatinine has elevated. Also held ARB for the same reason. 3.  Hypertensive urgency on admission- ctn current meds. 4.  Chronic paroxysmal A. fib, prosthetic mechanical aortic valve replacement, anticoagulation with Coumadin INR seems to be therapeutic with a goal of 2-3. 
5.  CKD stage III, monitor creatinine closely as patient is on diuretics. 6.  Anemia of chronic disease/MDS, monitor hemoglobin and transfuse as needed. Discussed with pt and daughter at bedside. Will hold off Procrit given her elevated Hb (which could be due to hemoconcentration). Will monitor Hb levels. 7.  Acute on Chronic COPD. We will keep her on bronchodilators and started her today on prednisone taper. 8.  Obstructive sleep apnea, keep her on BiPAP at night. 9.  Morbid obesity 10. CAD status post stent,  ischemic cardiomyopathy status post ICD/PPM. 11.  Pulmonary hypertension 12. Left leg wound, on wound VAC, consulted wound care. 13. Debility - PT/OT consult 14. GERD - Start Protonix. 
  
 Appreciate cardiology recs. DVT Prophylaxis: Warfarin Risk: High-risk patient for worsening respiratory failure and also cardiac complications. Mary Nye MD 
November 17, 2018

## 2018-11-17 NOTE — PROGRESS NOTES
Pt assisted back to bed. Pt tolerated well. Wound vac remains in place. Pt encouraged to call for assistance if needed call light in reach, door open will monitor. Pt has no s/sx of distress noted at this time.

## 2018-11-18 LAB
ANION GAP SERPL CALC-SCNC: 9 MMOL/L (ref 7–16)
BUN SERPL-MCNC: 80 MG/DL (ref 8–23)
CALCIUM SERPL-MCNC: 9.1 MG/DL (ref 8.3–10.4)
CHLORIDE SERPL-SCNC: 96 MMOL/L (ref 98–107)
CO2 SERPL-SCNC: 30 MMOL/L (ref 21–32)
CREAT SERPL-MCNC: 2.78 MG/DL (ref 0.6–1)
ERYTHROCYTE [DISTWIDTH] IN BLOOD BY AUTOMATED COUNT: 15.1 %
GLUCOSE BLD STRIP.AUTO-MCNC: 182 MG/DL (ref 65–100)
GLUCOSE BLD STRIP.AUTO-MCNC: 213 MG/DL (ref 65–100)
GLUCOSE BLD STRIP.AUTO-MCNC: 218 MG/DL (ref 65–100)
GLUCOSE BLD STRIP.AUTO-MCNC: 277 MG/DL (ref 65–100)
GLUCOSE SERPL-MCNC: 144 MG/DL (ref 65–100)
HCT VFR BLD AUTO: 33.9 % (ref 35.8–46.3)
HGB BLD-MCNC: 11 G/DL (ref 11.7–15.4)
INR PPP: 1.8
MAGNESIUM SERPL-MCNC: 2.4 MG/DL (ref 1.8–2.4)
MCH RBC QN AUTO: 28.3 PG (ref 26.1–32.9)
MCHC RBC AUTO-ENTMCNC: 32.4 G/DL (ref 31.4–35)
MCV RBC AUTO: 87.1 FL (ref 79.6–97.8)
NRBC # BLD: 0 K/UL (ref 0–0.2)
PLATELET # BLD AUTO: 263 K/UL (ref 150–450)
PMV BLD AUTO: 10.2 FL (ref 9.4–12.3)
POTASSIUM SERPL-SCNC: 4 MMOL/L (ref 3.5–5.1)
PROTHROMBIN TIME: 19.9 SEC (ref 11.5–14.5)
RBC # BLD AUTO: 3.89 M/UL (ref 4.05–5.2)
SODIUM SERPL-SCNC: 135 MMOL/L (ref 136–145)
WBC # BLD AUTO: 9 K/UL (ref 4.3–11.1)

## 2018-11-18 PROCEDURE — 74011250637 HC RX REV CODE- 250/637: Performed by: INTERNAL MEDICINE

## 2018-11-18 PROCEDURE — 3331090002 HH PPS REVENUE DEBIT

## 2018-11-18 PROCEDURE — 3331090001 HH PPS REVENUE CREDIT

## 2018-11-18 PROCEDURE — 85610 PROTHROMBIN TIME: CPT

## 2018-11-18 PROCEDURE — 74011250637 HC RX REV CODE- 250/637: Performed by: NURSE PRACTITIONER

## 2018-11-18 PROCEDURE — 85027 COMPLETE CBC AUTOMATED: CPT

## 2018-11-18 PROCEDURE — 83735 ASSAY OF MAGNESIUM: CPT

## 2018-11-18 PROCEDURE — 94760 N-INVAS EAR/PLS OXIMETRY 1: CPT

## 2018-11-18 PROCEDURE — 77010033678 HC OXYGEN DAILY

## 2018-11-18 PROCEDURE — 36415 COLL VENOUS BLD VENIPUNCTURE: CPT

## 2018-11-18 PROCEDURE — 82962 GLUCOSE BLOOD TEST: CPT

## 2018-11-18 PROCEDURE — 74011636637 HC RX REV CODE- 636/637: Performed by: INTERNAL MEDICINE

## 2018-11-18 PROCEDURE — 80048 BASIC METABOLIC PNL TOTAL CA: CPT

## 2018-11-18 PROCEDURE — 74011000250 HC RX REV CODE- 250: Performed by: HOSPITALIST

## 2018-11-18 PROCEDURE — 74750000023 HC WOUND THERAPY

## 2018-11-18 PROCEDURE — 94640 AIRWAY INHALATION TREATMENT: CPT

## 2018-11-18 PROCEDURE — 74011250637 HC RX REV CODE- 250/637: Performed by: HOSPITALIST

## 2018-11-18 PROCEDURE — 65660000000 HC RM CCU STEPDOWN

## 2018-11-18 RX ORDER — WARFARIN 2.5 MG/1
2.5 TABLET ORAL
Status: COMPLETED | OUTPATIENT
Start: 2018-11-18 | End: 2018-11-18

## 2018-11-18 RX ADMIN — PREDNISONE 40 MG: 20 TABLET ORAL at 17:06

## 2018-11-18 RX ADMIN — PANTOPRAZOLE SODIUM 40 MG: 40 TABLET, DELAYED RELEASE ORAL at 06:05

## 2018-11-18 RX ADMIN — ACETAMINOPHEN 650 MG: 325 TABLET, FILM COATED ORAL at 17:09

## 2018-11-18 RX ADMIN — IPRATROPIUM BROMIDE AND ALBUTEROL SULFATE 3 ML: .5; 3 SOLUTION RESPIRATORY (INHALATION) at 15:30

## 2018-11-18 RX ADMIN — ESCITALOPRAM OXALATE 20 MG: 10 TABLET ORAL at 08:00

## 2018-11-18 RX ADMIN — CARVEDILOL 12.5 MG: 12.5 TABLET, FILM COATED ORAL at 17:07

## 2018-11-18 RX ADMIN — INSULIN LISPRO 2 UNITS: 100 INJECTION, SOLUTION INTRAVENOUS; SUBCUTANEOUS at 06:05

## 2018-11-18 RX ADMIN — INSULIN LISPRO 4 UNITS: 100 INJECTION, SOLUTION INTRAVENOUS; SUBCUTANEOUS at 17:06

## 2018-11-18 RX ADMIN — IPRATROPIUM BROMIDE AND ALBUTEROL SULFATE 3 ML: .5; 3 SOLUTION RESPIRATORY (INHALATION) at 19:57

## 2018-11-18 RX ADMIN — DOCUSATE SODIUM 100 MG: 100 CAPSULE, LIQUID FILLED ORAL at 08:00

## 2018-11-18 RX ADMIN — BUDESONIDE 500 MCG: 0.5 INHALANT RESPIRATORY (INHALATION) at 07:38

## 2018-11-18 RX ADMIN — NYSTATIN 500000 UNITS: 500000 SUSPENSION ORAL at 21:32

## 2018-11-18 RX ADMIN — IPRATROPIUM BROMIDE AND ALBUTEROL SULFATE 3 ML: .5; 3 SOLUTION RESPIRATORY (INHALATION) at 07:38

## 2018-11-18 RX ADMIN — BUDESONIDE 500 MCG: 0.5 INHALANT RESPIRATORY (INHALATION) at 19:57

## 2018-11-18 RX ADMIN — WARFARIN SODIUM 2.5 MG: 2.5 TABLET ORAL at 10:52

## 2018-11-18 RX ADMIN — PREDNISONE 40 MG: 20 TABLET ORAL at 07:59

## 2018-11-18 RX ADMIN — SIMVASTATIN 20 MG: 20 TABLET, FILM COATED ORAL at 22:00

## 2018-11-18 RX ADMIN — INSULIN LISPRO 4 UNITS: 100 INJECTION, SOLUTION INTRAVENOUS; SUBCUTANEOUS at 21:32

## 2018-11-18 RX ADMIN — ISOSORBIDE MONONITRATE 30 MG: 30 TABLET, EXTENDED RELEASE ORAL at 08:00

## 2018-11-18 RX ADMIN — INSULIN LISPRO 6 UNITS: 100 INJECTION, SOLUTION INTRAVENOUS; SUBCUTANEOUS at 12:28

## 2018-11-18 RX ADMIN — IPRATROPIUM BROMIDE AND ALBUTEROL SULFATE 3 ML: .5; 3 SOLUTION RESPIRATORY (INHALATION) at 11:15

## 2018-11-18 RX ADMIN — LORATADINE 10 MG: 10 TABLET ORAL at 08:00

## 2018-11-18 RX ADMIN — CARVEDILOL 12.5 MG: 12.5 TABLET, FILM COATED ORAL at 07:59

## 2018-11-18 RX ADMIN — WARFARIN SODIUM 2.5 MG: 2.5 TABLET ORAL at 17:09

## 2018-11-18 RX ADMIN — NYSTATIN: 100000 POWDER TOPICAL at 08:01

## 2018-11-18 NOTE — PROGRESS NOTES
Patient stable, asleep in bed in no apparent distress. Bedside shift report to be given to oncoming nurse.

## 2018-11-18 NOTE — PROGRESS NOTES
Problem: Falls - Risk of 
Goal: *Absence of Falls Document Dannial Shadow Fall Risk and appropriate interventions in the flowsheet. Outcome: Progressing Towards Goal 
Fall Risk Interventions: 
Mobility Interventions: Bed/chair exit alarm Medication Interventions: Patient to call before getting OOB Elimination Interventions: Call light in reach History of Falls Interventions: Evaluate medications/consider consulting pharmacy Problem: Pressure Injury - Risk of 
Goal: *Prevention of pressure injury Document Bahman Scale and appropriate interventions in the flowsheet. Outcome: Progressing Towards Goal 
Pressure Injury Interventions: 
Sensory Interventions: Assess changes in LOC Moisture Interventions: Assess need for specialty bed Activity Interventions: Increase time out of bed Mobility Interventions: Assess need for specialty bed Nutrition Interventions: Document food/fluid/supplement intake Friction and Shear Interventions: Lift sheet

## 2018-11-18 NOTE — PROGRESS NOTES
Bedside shift report received from Wes, [facility]. Patient stable.

## 2018-11-18 NOTE — PROGRESS NOTES
Progress Note Patient: Sandrita Gama               Sex: female          MRN: [id number] YOB: 1950      Age:  77 y.o. PCP:  Bimal Hernandez MD 
Treatment Team: Attending Provider: Ra Barroso MD; Consulting Provider: Gómez Mallory MD; Utilization Review: Familia Rm; Care Manager: Thomas Obando Subjective:  
 
17-year-old lady with multiple medical problems including hypertension, COPD, severe systolic CHF with left ventricular ejection fraction of 25-30%, mechanical aortic valve, CKD stage III, obesity came to emergency room with complaints of increasing shortness of breath over the last 2 days. Shortness of breath was severe, worse with minimal exertion, not resolved with rest.  The pain is worse with lying flat, sitting up. No fevers or chills. No cough or wheezing. No abdominal pain or diarrhea or burning urination. Lactic acid,  pro calcitonin, WBC count are normal.  Influenza screen is negative. 
  
2018: Pt seen and examined. She was sitting at the side of the bed. Reports breathing improved, however, continues to feel sob and wheezing and reports she feels not ready to go home yet. No fever, chills, CP, abd pain, N/V or urinary symptoms. 2018: Pt seen and examined. She was lying in bed. Reports breathing improving but still sob and not back to her baseline. Otherwise, no fever, chills, CP, abd pain, N/V or urinary symptoms. 2018 Pt was seen and examined, remains stable, SOB is improving, no CP, no N/V 
 
Objective:  
Physical Exam:  
Visit Vitals /77 Pulse 80 Temp 98.2 °F (36.8 °C) Resp 18 Ht 5' 2\" (1.575 m) Wt 93.9 kg (207 lb 1.6 oz) SpO2 95% Breastfeeding? No  
BMI 37.88 kg/m² Temp (24hrs), Av.7 °F (36.5 °C), Min:97.4 °F (36.3 °C), Max:98.2 °F (36.8 °C) Oxygen Therapy O2 Sat (%): 95 % (18 1116) Pulse via Oximetry: 72 beats per minute (18) O2 Device: Nasal cannula (11/18/18 1116) O2 Flow Rate (L/min): 3 l/min (11/18/18 1116) FIO2 (%): 32 % (11/17/18 0545) Intake/Output Summary (Last 24 hours) at 11/18/2018 1447 Last data filed at 11/18/2018 0943 Gross per 24 hour Intake 480 ml Output 100 ml Net 380 ml General:          Conscious, no acute distress Eyes:               ANMOL, No pallor/icterus HENT:             Oral Mucosa is Moist 
Neck:               Supple, elevated JVP Lungs:             Diminished air entry bilaterally mild wheezing bilaterally. ; 
Heart:              S1 S2 regular Abdomen:        Soft, Normal bowel sounds, NTND, No guarding/rigidity/rebound tend. Extremities:     Pedal edema+ Neurologic:      AAOX3, No acute FND, Motor:  LUE: 5/5, LLE: 5/5, RUE: 5/5, RLE: 5/5 Skin:                No acute rashes Musculoskeletal: No Acute findings. . Left leg wound vac in place. Psych:             Appropriate mood, thought process is normal 
  
 
LAB Recent Results (from the past 24 hour(s)) GLUCOSE, POC Collection Time: 11/17/18  3:42 PM  
Result Value Ref Range Glucose (POC) 219 (H) 65 - 100 mg/dL GLUCOSE, POC Collection Time: 11/17/18  8:29 PM  
Result Value Ref Range Glucose (POC) 121 (H) 65 - 100 mg/dL GLUCOSE, POC Collection Time: 11/18/18  5:17 AM  
Result Value Ref Range Glucose (POC) 182 (H) 65 - 100 mg/dL PROTHROMBIN TIME + INR Collection Time: 11/18/18  7:19 AM  
Result Value Ref Range Prothrombin time 19.9 (H) 11.5 - 14.5 sec INR 1.8 METABOLIC PANEL, BASIC Collection Time: 11/18/18  7:19 AM  
Result Value Ref Range Sodium 135 (L) 136 - 145 mmol/L Potassium 4.0 3.5 - 5.1 mmol/L Chloride 96 (L) 98 - 107 mmol/L  
 CO2 30 21 - 32 mmol/L Anion gap 9 7 - 16 mmol/L Glucose 144 (H) 65 - 100 mg/dL BUN 80 (H) 8 - 23 MG/DL Creatinine 2.78 (H) 0.6 - 1.0 MG/DL  
 GFR est AA 22 (L) >60 ml/min/1.73m2 GFR est non-AA 18 (L) >60 ml/min/1.73m2 Calcium 9.1 8.3 - 10.4 MG/DL  
CBC W/O DIFF Collection Time: 11/18/18  7:19 AM  
Result Value Ref Range WBC 9.0 4.3 - 11.1 K/uL  
 RBC 3.89 (L) 4.05 - 5.2 M/uL  
 HGB 11.0 (L) 11.7 - 15.4 g/dL HCT 33.9 (L) 35.8 - 46.3 % MCV 87.1 79.6 - 97.8 FL  
 MCH 28.3 26.1 - 32.9 PG  
 MCHC 32.4 31.4 - 35.0 g/dL  
 RDW 15.1 % PLATELET 672 628 - 601 K/uL MPV 10.2 9.4 - 12.3 FL ABSOLUTE NRBC 0.00 0.0 - 0.2 K/uL MAGNESIUM Collection Time: 11/18/18  7:19 AM  
Result Value Ref Range Magnesium 2.4 1.8 - 2.4 mg/dL GLUCOSE, POC Collection Time: 11/18/18 11:19 AM  
Result Value Ref Range Glucose (POC) 277 (H) 65 - 100 mg/dL No results found. No results found. All Micro Results Procedure Component Value Units Date/Time CULTURE, BLOOD [[id number]] Collected:  11/14/18 1034 Order Status:  Completed Specimen:  Blood Updated:  11/18/18 5168 Special Requests: --     
  LEFT Antecubital 
  
  Culture result: NO GROWTH 4 DAYS     
 CULTURE, BLOOD [[id number]]  (Abnormal) Collected:  11/14/18 1159 Order Status:  Completed Specimen:  Blood Updated:  11/17/18 1075 Special Requests: --     
  RIGHT 
ARM 
  
  GRAM STAIN    
  GRAM POS COCCI IN CLUSTERS  
     
   ANAEROBIC BOTTLE POSITIVE RESULTS VERIFIED, PHONED TO AND READ BACK BY Prerna Graf @ 9850 ON 11/15/18 BY NKE. Culture result: STAPHYLOCOCCUS SPECIES, COAGULASE NEGATIVE  
     
      
  THIS ORGANISM MAY BE INDICATIVE OF CULTURE CONTAMINATION, HOWEVER, CLINICAL CORRELATION NEEDS TO BE EVALUATED, AS EACH CASE IS UNIQUE. INFLUENZA A & B AG (RAPID TEST) [[id number]] Collected:  11/14/18 1133 Order Status:  Completed Specimen:  Nasopharyngeal from Nasal washing Updated:  11/14/18 1158 Influenza A Ag NEGATIVE Comment: NEGATIVE FOR THE PRESENCE OF INFLUENZA A ANTIGEN 
INFECTION DUE TO INFLUENZA A CANNOT BE RULED OUT. BECAUSE THE ANTIGEN PRESENT IN THE SAMPLE MAY BE BELOW 
THE DETECTION LIMIT OF THE TEST. A NEGATIVE TEST IS PRESUMPTIVE AND IT IS RECOMMENDED THAT THESE RESULTS BE CONFIRMED BY VIRAL CULTURE OR AN FDA-CLEARED INFLUENZA A AND B MOLECULAR ASSAY. Influenza B Ag NEGATIVE Comment: NEGATIVE FOR THE PRESENCE OF INFLUENZA B ANTIGEN 
INFECTION DUE TO INFLUENZA B CANNOT BE RULED OUT. BECAUSE THE ANTIGEN PRESENT IN THE SAMPLE MAY BE BELOW 
THE DETECTION LIMIT OF THE TEST. A NEGATIVE TEST IS PRESUMPTIVE AND IT IS RECOMMENDED THAT THESE RESULTS BE CONFIRMED BY VIRAL CULTURE OR AN FDA-CLEARED INFLUENZA A AND B MOLECULAR ASSAY. Source NASOPHARYNGEAL Current Medications Reviewed Assessment/Plan 1. Acute on chronic hypoxemic and hypercapnic respiratory failure. Likely secondary to COPD exacerbation worsened by CHF, Obesity, sleep apnea. 2.  Acute on chronic systolic CHF exacerbation. BNP 1600 on admission. Received Lasix IV then po. Now taken off lasix as her creatinine has elevated. Also held ARB for the same reason. Creatinine is currently better today 3. Hypertensive urgency on admission- ctn current meds. Blood pressure was on the low side currently on Coreg 12.5 mg twice daily, will monitor 4. Chronic paroxysmal A. fib, prosthetic mechanical aortic valve replacement, anticoagulation with Coumadin INR seems to be therapeutic with a goal of 2-3. 
 
5.  Acute renal failure with CKD stage III, renal function slightly better today, monitor, currently off diuretics, off ACE inhibitor 6. Anemia of chronic disease/MDS, monitor hemoglobin and transfuse as needed. Discussed with pt and daughter at bedside. Will hold off Procrit given her elevated Hb (which could be due to hemoconcentration). Will monitor Hb levels. Hemoglobin is currently stable 7. Acute on Chronic COPD. We will keep her on bronchodilators and started her  on prednisone taper. 8.  Obstructive sleep apnea, keep her on BiPAP at night. 9.  Morbid obesity 10. CAD status post stent,  ischemic cardiomyopathy status post ICD/PPM. 11.  Pulmonary hypertension 12. Left leg wound, on wound VAC, consulted wound care. 13. Debility - PT/OT consult 14. GERD - Start Protonix. Appreciate cardiology recs. DVT Prophylaxis: Warfarin Risk: High-risk patient for worsening respiratory failure and also cardiac complications. Scott Loya MD 
November 18, 2018

## 2018-11-18 NOTE — PROGRESS NOTES
Bedside and Verbal shift change report received from Geetha Chavez. Report included the following information SBAR, Kardex, MAR and Recent Results. assumed care of patient. Patient up in chair, daughter at the bedside. Patient is alert and oriented x4. Shift assessment complete and medicated per MAR. Wound vac in place, suction on. No complaints of pain and any needs at this time. Call light is within reach. Will continue to monitor.

## 2018-11-18 NOTE — PROGRESS NOTES
Visited by Raymundo Patel She was awake and sitting in chair Wanted to talk about [place] and the [place] life She said God helps her thru everyday Said she tries to pass her joao on to all her family Prayer Esperanza Molina, staff [phone], [phone] [facility] Jason  /   [email or web address]

## 2018-11-18 NOTE — PROGRESS NOTES
[facility] CARDIOLOGY PROGRESS NOTE 
 
11/18/2018 7:36 AM 
 
Admit Date: 11/14/2018 Admit Diagnosis: SOB (shortness of breath); Acute exacerbation of CHF (congestive heart failure) ([facility].) Subjective:  
Stable overnight without angina, CHF, or palpitations. Vitals stable and controlled. No other complaints overnight. Tolerating meds well. Awaiting labs. Objective:  
  
Vitals:  
 11/18/18 0019 11/18/18 0400 11/18/18 0408 11/18/18 8795 BP: 100/57  120/69 Pulse: 78 70 77 Resp: 19  18 Temp: 97.4 °F (36.3 °C)  97.7 °F (36.5 °C) SpO2: 94%  99% Weight:    93.9 kg (207 lb 1.6 oz) Height:      
 
 
Physical Exam: 
Neck- supple, no JVD 
CV- regular rate and rhythm, crisp AVR click, 1/6 ONEIL RUSB Lung- clear bilaterally apically, decreased bibasilar Abd- soft, nontender, nondistended Ext- no edema Skin- warm and dry Data Review:  
Recent Labs 11/17/18 
0551 11/16/18 
0622 11/16/18 
0600  140  --   
K 4.5 3.8  --   
MG  --   --  2.0  
BUN 69* 39*  --   
CREA 3.07* 1.67*  --   
* 127*  --   
WBC  --  10.9  --   
HGB  --  11.1*  --   
HCT  --  35.4*  --   
PLT  --  252  --   
INR 2.0 2.3  -- Assessment and Plan:  
 
Principal Problem: 
  Acute exacerbation of CHF (congestive heart failure) ([facility] (11/14/2018)- improving, lying flat comfortably, no edema, follow renal function closely, resume lower dose diuretics as tolerated/needed.  
  
Active Problems: 
  SOB (shortness of breath) (11/14/2018)- improved, comfortable at rest, see above.  
  
 Acute renal failure- hypotension, ARB and lasix on board- yesterday we stopped diuretics, continue to hold entresto, hold coreg and imdur until systolic>100-110, recheck in AM- await labs from this AM 
  
Mechanical AVR- stable, continue warfarin, check INR QAM 
  
COPD- stable, no wheezing or SOB at present, follow clinically 
  
CBC, BMP, Mg in AM 
Hold ARB and lasix as above- await AM labs COLETTE Mireles MD 
 [facility] Cardiology Pager [phone]

## 2018-11-18 NOTE — PROGRESS NOTES
Warfarin dosing per pharmacist 
 
Norman Rueda Lizzie Garcia is a 77 y.o. female. Indication:  s/p AVR Goal INR:  2-3 Home dose:  5 mg (5 mg x 1) on Fri; 2.5 mg (5 mg x 0.5) all other days Risk factors or significant drug interactions:  -- Other anticoagulants:  -- 
 
Daily Monitoring Date  INR     Warfarin dose HGB              Notes 11/14  2.1  2.5 mg  9.4 
11/15  2.3  2.5 mg  9.8 
11/16  2.3  2.5 mg  11.1 
11/17  2  5 mg  ---- 
11/18  1.8  2.5mg + 2.5 11 Pharmacy consulted to assist in Ms. Corrla's warfarin dosing and monitoring during this admission. Home dose per last anticoagulation note, is 2.5mg daily except on Fridays, take 5mg daily. INR within therapeutic range today. Warfarin 5mg X1 was ordered last night with plans to resume home dose today; however, INR has trended down. Per MD additional 2.5mg X1 dose ordered this morning. I plan to continue home dose tonight of 2.5mg for total daily dose of 5mg today. Reassess daily INR for dose adjustments as indicated. Pharmacy will continue to follow. Please call with any questions. Thank you, Dolores Roman, PharmD, [facility] Clinical Pharmacist 
[phone]

## 2018-11-18 NOTE — PROGRESS NOTES
Patient is in wonderful spirits this morning Reflected upon the goodness of God Assured her of our continued prayers and support Juan Hi, staff [address], [phone] [facility] Jason  /   [email or web address]

## 2018-11-19 LAB
ANION GAP SERPL CALC-SCNC: 7 MMOL/L (ref 7–16)
BACTERIA SPEC CULT: NORMAL
BUN SERPL-MCNC: 79 MG/DL (ref 8–23)
CALCIUM SERPL-MCNC: 9.4 MG/DL (ref 8.3–10.4)
CHLORIDE SERPL-SCNC: 96 MMOL/L (ref 98–107)
CO2 SERPL-SCNC: 31 MMOL/L (ref 21–32)
CREAT SERPL-MCNC: 2.2 MG/DL (ref 0.6–1)
ERYTHROCYTE [DISTWIDTH] IN BLOOD BY AUTOMATED COUNT: 15 %
EST. AVERAGE GLUCOSE BLD GHB EST-MCNC: 128 MG/DL
GLUCOSE BLD STRIP.AUTO-MCNC: 190 MG/DL (ref 65–100)
GLUCOSE BLD STRIP.AUTO-MCNC: 203 MG/DL (ref 65–100)
GLUCOSE BLD STRIP.AUTO-MCNC: 213 MG/DL (ref 65–100)
GLUCOSE BLD STRIP.AUTO-MCNC: 250 MG/DL (ref 65–100)
GLUCOSE SERPL-MCNC: 175 MG/DL (ref 65–100)
HBA1C MFR BLD: 6.1 % (ref 4.8–6)
HCT VFR BLD AUTO: 30.7 % (ref 35.8–46.3)
HGB BLD-MCNC: 9.9 G/DL (ref 11.7–15.4)
INR PPP: 2.4
MAGNESIUM SERPL-MCNC: 2.5 MG/DL (ref 1.8–2.4)
MCH RBC QN AUTO: 28 PG (ref 26.1–32.9)
MCHC RBC AUTO-ENTMCNC: 32.2 G/DL (ref 31.4–35)
MCV RBC AUTO: 87 FL (ref 79.6–97.8)
NRBC # BLD: 0 K/UL (ref 0–0.2)
PLATELET # BLD AUTO: 224 K/UL (ref 150–450)
PMV BLD AUTO: 10.9 FL (ref 9.4–12.3)
POTASSIUM SERPL-SCNC: 4.4 MMOL/L (ref 3.5–5.1)
PROTHROMBIN TIME: 24.7 SEC (ref 11.5–14.5)
RBC # BLD AUTO: 3.53 M/UL (ref 4.05–5.2)
SERVICE CMNT-IMP: NORMAL
SODIUM SERPL-SCNC: 134 MMOL/L (ref 136–145)
WBC # BLD AUTO: 6.7 K/UL (ref 4.3–11.1)

## 2018-11-19 PROCEDURE — 85610 PROTHROMBIN TIME: CPT

## 2018-11-19 PROCEDURE — 74011250637 HC RX REV CODE- 250/637: Performed by: HOSPITALIST

## 2018-11-19 PROCEDURE — 74750000023 HC WOUND THERAPY

## 2018-11-19 PROCEDURE — 97605 NEG PRS WND THER DME<=50SQCM: CPT

## 2018-11-19 PROCEDURE — 36415 COLL VENOUS BLD VENIPUNCTURE: CPT

## 2018-11-19 PROCEDURE — 83735 ASSAY OF MAGNESIUM: CPT

## 2018-11-19 PROCEDURE — 3331090002 HH PPS REVENUE DEBIT

## 2018-11-19 PROCEDURE — 77030019934 HC DRSG VAC ASST KCON -B

## 2018-11-19 PROCEDURE — 3331090001 HH PPS REVENUE CREDIT

## 2018-11-19 PROCEDURE — 74011636637 HC RX REV CODE- 636/637: Performed by: INTERNAL MEDICINE

## 2018-11-19 PROCEDURE — 85027 COMPLETE CBC AUTOMATED: CPT

## 2018-11-19 PROCEDURE — 74011250637 HC RX REV CODE- 250/637: Performed by: INTERNAL MEDICINE

## 2018-11-19 PROCEDURE — 83036 HEMOGLOBIN GLYCOSYLATED A1C: CPT

## 2018-11-19 PROCEDURE — 74011250637 HC RX REV CODE- 250/637: Performed by: NURSE PRACTITIONER

## 2018-11-19 PROCEDURE — 80048 BASIC METABOLIC PNL TOTAL CA: CPT

## 2018-11-19 PROCEDURE — 77010033678 HC OXYGEN DAILY

## 2018-11-19 PROCEDURE — 74011000250 HC RX REV CODE- 250: Performed by: HOSPITALIST

## 2018-11-19 PROCEDURE — 82962 GLUCOSE BLOOD TEST: CPT

## 2018-11-19 PROCEDURE — 77030027688 HC DRSG MEPILEX 16-48IN NO BORD MOLN -A

## 2018-11-19 PROCEDURE — 94640 AIRWAY INHALATION TREATMENT: CPT

## 2018-11-19 PROCEDURE — 94760 N-INVAS EAR/PLS OXIMETRY 1: CPT

## 2018-11-19 PROCEDURE — 65660000000 HC RM CCU STEPDOWN

## 2018-11-19 RX ORDER — PREDNISONE 20 MG/1
40 TABLET ORAL
Status: DISCONTINUED | OUTPATIENT
Start: 2018-11-20 | End: 2018-11-20 | Stop reason: HOSPADM

## 2018-11-19 RX ADMIN — INSULIN LISPRO 2 UNITS: 100 INJECTION, SOLUTION INTRAVENOUS; SUBCUTANEOUS at 06:30

## 2018-11-19 RX ADMIN — INSULIN LISPRO 6 UNITS: 100 INJECTION, SOLUTION INTRAVENOUS; SUBCUTANEOUS at 17:28

## 2018-11-19 RX ADMIN — ISOSORBIDE MONONITRATE 30 MG: 30 TABLET, EXTENDED RELEASE ORAL at 08:30

## 2018-11-19 RX ADMIN — WARFARIN SODIUM 2.5 MG: 2.5 TABLET ORAL at 17:28

## 2018-11-19 RX ADMIN — INSULIN LISPRO 4 UNITS: 100 INJECTION, SOLUTION INTRAVENOUS; SUBCUTANEOUS at 11:47

## 2018-11-19 RX ADMIN — ESCITALOPRAM OXALATE 20 MG: 10 TABLET ORAL at 08:30

## 2018-11-19 RX ADMIN — LORATADINE 10 MG: 10 TABLET ORAL at 08:29

## 2018-11-19 RX ADMIN — PANTOPRAZOLE SODIUM 40 MG: 40 TABLET, DELAYED RELEASE ORAL at 06:30

## 2018-11-19 RX ADMIN — IPRATROPIUM BROMIDE AND ALBUTEROL SULFATE 3 ML: .5; 3 SOLUTION RESPIRATORY (INHALATION) at 16:20

## 2018-11-19 RX ADMIN — INSULIN LISPRO 2 UNITS: 100 INJECTION, SOLUTION INTRAVENOUS; SUBCUTANEOUS at 21:46

## 2018-11-19 RX ADMIN — NYSTATIN: 100000 POWDER TOPICAL at 08:30

## 2018-11-19 RX ADMIN — IPRATROPIUM BROMIDE AND ALBUTEROL SULFATE 3 ML: .5; 3 SOLUTION RESPIRATORY (INHALATION) at 11:15

## 2018-11-19 RX ADMIN — BUDESONIDE 500 MCG: 0.5 INHALANT RESPIRATORY (INHALATION) at 20:12

## 2018-11-19 RX ADMIN — CARVEDILOL 12.5 MG: 12.5 TABLET, FILM COATED ORAL at 17:28

## 2018-11-19 RX ADMIN — IPRATROPIUM BROMIDE AND ALBUTEROL SULFATE 3 ML: .5; 3 SOLUTION RESPIRATORY (INHALATION) at 08:05

## 2018-11-19 RX ADMIN — CARVEDILOL 12.5 MG: 12.5 TABLET, FILM COATED ORAL at 08:29

## 2018-11-19 RX ADMIN — IPRATROPIUM BROMIDE AND ALBUTEROL SULFATE 3 ML: .5; 3 SOLUTION RESPIRATORY (INHALATION) at 20:12

## 2018-11-19 RX ADMIN — DOCUSATE SODIUM 100 MG: 100 CAPSULE, LIQUID FILLED ORAL at 08:30

## 2018-11-19 RX ADMIN — PREDNISONE 40 MG: 20 TABLET ORAL at 08:29

## 2018-11-19 RX ADMIN — BUDESONIDE 500 MCG: 0.5 INHALANT RESPIRATORY (INHALATION) at 08:05

## 2018-11-19 RX ADMIN — NYSTATIN: 100000 POWDER TOPICAL at 21:00

## 2018-11-19 RX ADMIN — SIMVASTATIN 20 MG: 20 TABLET, FILM COATED ORAL at 21:46

## 2018-11-19 NOTE — PROGRESS NOTES
Pt assisted back to bed. Pt has been up in the chair most of the day. Pt with good urine output for today. Pt denies pain or distress at this time. Call light in reach, door open will monitor.

## 2018-11-19 NOTE — PROGRESS NOTES
Problem: Falls - Risk of 
Goal: *Absence of Falls Document Eddie Pereira Fall Risk and appropriate interventions in the flowsheet. Outcome: Progressing Towards Goal 
Fall Risk Interventions: 
Mobility Interventions: Patient to call before getting OOB Medication Interventions: Patient to call before getting OOB Elimination Interventions: Call light in reach History of Falls Interventions: Investigate reason for fall Problem: Pressure Injury - Risk of 
Goal: *Prevention of pressure injury Document Bahman Scale and appropriate interventions in the flowsheet. Outcome: Progressing Towards Goal 
Pressure Injury Interventions: 
Sensory Interventions: Assess changes in LOC Moisture Interventions: Minimize layers Activity Interventions: Increase time out of bed Mobility Interventions: HOB 30 degrees or less Nutrition Interventions: Document food/fluid/supplement intake Friction and Shear Interventions: HOB 30 degrees or less

## 2018-11-19 NOTE — PROGRESS NOTES
[facility] CARDIOLOGY PROGRESS NOTE 
 
11/19/2018 7:55 AM 
 
Admit Date: 11/14/2018 Admit Diagnosis: SOB (shortness of breath); Acute exacerbation of CHF (congestive heart failure) ([facility] Subjective:  
Stable overnight without angina, CHF, or palpitations. Vitals stable and controlled. No other complaints overnight. Tolerating meds well. Lying flat comfortably. BP and HR stable. INR 2.4 today. Objective:  
  
Vitals:  
 11/19/18 0307 11/19/18 0400 11/19/18 0540 11/19/18 7475 BP: 120/71   123/63 Pulse: 60 63  73 Resp: 18   17 Temp: 98.6 °F (37 °C)   98.1 °F (36.7 °C) SpO2: 96%   94% Weight:   97.5 kg (215 lb) Height:      
 
 
Physical Exam: 
Neck- supple, no JVD 
CV- regular rate and rhythm, crisp AVR click Lung- clear bilaterally Abd- soft, nontender, nondistended Ext- trace LE edema Skin- warm and dry Data Review:  
Recent Labs 11/19/18 
2625 11/18/18 
0719 * 135* K 4.4 4.0  
MG 2.5* 2.4 BUN 79* 80* CREA 2.20* 2.78* * 144* WBC 6.7 9.0 HGB 9.9* 11.0*  
HCT 30.7* 33.9*  
 263 INR 2.4 1.8 Assessment and Plan:  
 
Principal Problem: 
  Acute exacerbation of CHF (congestive heart failure) ([facility] (11/14/2018)- improving, lying flat comfortably, no/minimal edema, follow renal function closely, resume lower dose diuretics as tolerated/needed.  
  
Active Problems: 
  SOB (shortness of breath) (11/14/2018)- improved, comfortable at rest, see above.  
  
 Acute renal failure- hypotension, ARB and lasix on board- over weekend we stopped diuretics, continue to hold entresto, hold coreg and imdur until systolic>100-110, recheck in AM- await labs from this AM 
  
Mechanical AVR- stable, continue warfarin, check INR QAM 
  
COPD- stable, no wheezing or SOB at present, follow clinically Stable from volume standpoint, renal function slowly improving.  Continue meds as currently taking, BP and renal function will probably not tolerate entresto yet. Continue current meds, hydrate normally, IM seeing as well. Alicia Lopez MD 
[facility] Cardiology Pager [phone]

## 2018-11-19 NOTE — PROGRESS NOTES
Progress Note Patient: Russ Yates               Sex: female          MRN: [id number] YOB: 1950      Age:  77 y.o. PCP:  Alex Carlos MD 
Treatment Team: Attending Provider: Will Donahue MD; Consulting Provider: Debbi Meek MD; Utilization Review: Casey Hu; Care Manager: Ramone Ferreira Subjective:  
 
70-year-old lady with multiple medical problems including hypertension, COPD, severe systolic CHF with left ventricular ejection fraction of 25-30%, mechanical aortic valve, CKD stage III, obesity came to emergency room with complaints of increasing shortness of breath over the last 2 days. She is admitted for CHF exacerbation and COPD exacerbation 
  
Subjective 2018 Pt was seen and examined, remains stable, SOB is improving, no CP, no N/V, making urine Objective:  
Physical Exam:  
Visit Vitals /63 (BP 1 Location: Left arm, BP Patient Position: At rest) Pulse 73 Temp 98.1 °F (36.7 °C) Resp 17 Ht 5' 2\" (1.575 m) Wt 97.5 kg (215 lb) SpO2 97% Breastfeeding? No  
BMI 39.32 kg/m² Temp (24hrs), Av.1 °F (36.7 °C), Min:97.7 °F (36.5 °C), Max:98.6 °F (37 °C) Oxygen Therapy O2 Sat (%): 97 % (18) Pulse via Oximetry: 71 beats per minute (18) O2 Device: Nasal cannula (18) O2 Flow Rate (L/min): 3 l/min (18) FIO2 (%): 32 % (18 0545) Intake/Output Summary (Last 24 hours) at 2018 1027 Last data filed at 2018 7960 Gross per 24 hour Intake 716 ml Output 500 ml Net 216 ml General:          Conscious, no acute distress HENT:             Oral Mucosa is Moist 
Lungs:             Clear Heart:              S1 S2 regular Abdomen:        Soft, Normal bowel sounds, NTND, No guarding/rigidity/rebound tend. Extremities:     trace edema Neurologic:      AAOX3, No acute FND, Skin:                No acute rashes Musculoskeletal: No Acute findings. Radha Solo Psych:             Appropriate mood, thought process is normal 
  
 
LAB Recent Results (from the past 24 hour(s)) GLUCOSE, POC Collection Time: 11/18/18 11:19 AM  
Result Value Ref Range Glucose (POC) 277 (H) 65 - 100 mg/dL GLUCOSE, POC Collection Time: 11/18/18  4:03 PM  
Result Value Ref Range Glucose (POC) 218 (H) 65 - 100 mg/dL GLUCOSE, POC Collection Time: 11/18/18  8:40 PM  
Result Value Ref Range Glucose (POC) 213 (H) 65 - 100 mg/dL GLUCOSE, POC Collection Time: 11/19/18  5:17 AM  
Result Value Ref Range Glucose (POC) 190 (H) 65 - 100 mg/dL PROTHROMBIN TIME + INR Collection Time: 11/19/18  6:28 AM  
Result Value Ref Range Prothrombin time 24.7 (H) 11.5 - 14.5 sec INR 2.4 METABOLIC PANEL, BASIC Collection Time: 11/19/18  6:28 AM  
Result Value Ref Range Sodium 134 (L) 136 - 145 mmol/L Potassium 4.4 3.5 - 5.1 mmol/L Chloride 96 (L) 98 - 107 mmol/L  
 CO2 31 21 - 32 mmol/L Anion gap 7 7 - 16 mmol/L Glucose 175 (H) 65 - 100 mg/dL BUN 79 (H) 8 - 23 MG/DL Creatinine 2.20 (H) 0.6 - 1.0 MG/DL  
 GFR est AA 28 (L) >60 ml/min/1.73m2 GFR est non-AA 23 (L) >60 ml/min/1.73m2 Calcium 9.4 8.3 - 10.4 MG/DL  
CBC W/O DIFF Collection Time: 11/19/18  6:28 AM  
Result Value Ref Range WBC 6.7 4.3 - 11.1 K/uL  
 RBC 3.53 (L) 4.05 - 5.2 M/uL HGB 9.9 (L) 11.7 - 15.4 g/dL HCT 30.7 (L) 35.8 - 46.3 % MCV 87.0 79.6 - 97.8 FL  
 MCH 28.0 26.1 - 32.9 PG  
 MCHC 32.2 31.4 - 35.0 g/dL  
 RDW 15.0 % PLATELET 724 474 - 813 K/uL MPV 10.9 9.4 - 12.3 FL ABSOLUTE NRBC 0.00 0.0 - 0.2 K/uL MAGNESIUM Collection Time: 11/19/18  6:28 AM  
Result Value Ref Range Magnesium 2.5 (H) 1.8 - 2.4 mg/dL No results found. No results found. All Micro Results Procedure Component Value Units Date/Time CULTURE, BLOOD [[id number]] Collected:  11/14/18 1034 Order Status:  Completed Specimen:  Blood Updated:  11/19/18 2467 Special Requests: --     
  LEFT Antecubital 
  
  Culture result: NO GROWTH 5 DAYS     
 CULTURE, BLOOD [[id number]]  (Abnormal) Collected:  11/14/18 1159 Order Status:  Completed Specimen:  Blood Updated:  11/17/18 2706 Special Requests: --     
  RIGHT 
ARM 
  
  GRAM STAIN    
  GRAM POS COCCI IN CLUSTERS  
     
   ANAEROBIC BOTTLE POSITIVE RESULTS VERIFIED, PHONED TO AND READ BACK BY Butch Nolan @ 9354 ON 11/15/18 BY NKE. Culture result: STAPHYLOCOCCUS SPECIES, COAGULASE NEGATIVE  
     
      
  THIS ORGANISM MAY BE INDICATIVE OF CULTURE CONTAMINATION, HOWEVER, CLINICAL CORRELATION NEEDS TO BE EVALUATED, AS EACH CASE IS UNIQUE. INFLUENZA A & B AG (RAPID TEST) [[id number]] Collected:  11/14/18 1138 Order Status:  Completed Specimen:  Nasopharyngeal from Nasal washing Updated:  11/14/18 1158 Influenza A Ag NEGATIVE Comment: NEGATIVE FOR THE PRESENCE OF INFLUENZA A ANTIGEN 
INFECTION DUE TO INFLUENZA A CANNOT BE RULED OUT. BECAUSE THE ANTIGEN PRESENT IN THE SAMPLE MAY BE BELOW 
THE DETECTION LIMIT OF THE TEST. A NEGATIVE TEST IS PRESUMPTIVE AND IT IS RECOMMENDED THAT THESE RESULTS BE CONFIRMED BY VIRAL CULTURE OR AN FDA-CLEARED INFLUENZA A AND B MOLECULAR ASSAY. Influenza B Ag NEGATIVE Comment: NEGATIVE FOR THE PRESENCE OF INFLUENZA B ANTIGEN 
INFECTION DUE TO INFLUENZA B CANNOT BE RULED OUT. BECAUSE THE ANTIGEN PRESENT IN THE SAMPLE MAY BE BELOW 
THE DETECTION LIMIT OF THE TEST. A NEGATIVE TEST IS PRESUMPTIVE AND IT IS RECOMMENDED THAT THESE RESULTS BE CONFIRMED BY VIRAL CULTURE OR AN FDA-CLEARED INFLUENZA A AND B MOLECULAR ASSAY. Source NASOPHARYNGEAL Current Medications Reviewed Assessment/Plan 1. Acute on chronic hypoxemic and hypercapnic respiratory failure. Likely secondary to COPD exacerbation worsened by CHF, Obesity, sleep apnea. 2.  Acute on chronic systolic CHF exacerbation. BNP 1600 on admission. Received Lasix IV then po. Now taken off lasix as her creatinine has elevated. Also held ARB for the same reason. Creatinine is currently better, resumption of PO lasix as per cardiology She is off  [place] 3. Hypertensive urgency on admission- ctn current meds. Blood pressure stable currently on Coreg 12.5 mg twice daily, will monitor 4. Chronic paroxysmal A. fib, prosthetic mechanical aortic valve replacement, anticoagulation with Coumadin INR seems to be therapeutic with a goal of 2-3. 
 
5.  Acute renal failure with CKD stage III,  
currently off diuretics, off ACE inhibitor, Cr improving, monitor 6. Anemia of chronic disease/MDS, monitor hemoglobin and transfuse as needed. Discussed with pt and daughter at bedside. Will hold off Procrit given her elevated Hb (which could be due to hemoconcentration). Will monitor Hb levels. Hemoglobin is currently stable 7. Acute on Chronic COPD. We will keep her on bronchodilators and started her  on prednisone taper. Decreased to 40mg every day on 11/19 
 
8. Obstructive sleep apnea, keep her on BiPAP at night. 9.  Morbid obesity 11. Pulmonary hypertension 12. Left leg wound, on wound VAC, consulted wound care. 13. Debility - PT/OT consult 14. GERD - Start Protonix. Appreciate cardiology recs. DVT Prophylaxis: Warfarin Risk: High-risk patient for worsening respiratory failure and also cardiac complications. DC planning- Once cleared by Cardiology Comfort Chaves MD 
November 19, 2018

## 2018-11-19 NOTE — WOUND CARE
Left knee wound vac dressing change, wound well granulating. Skin prep applied madi wound. Foam used below knee to protect madi wound skin distally . Will monitor.

## 2018-11-19 NOTE — PROGRESS NOTES
Pt sitting up in chair talking to friend, no distress noted at this time. Call light in reach, door open will monitor.

## 2018-11-19 NOTE — PROGRESS NOTES
Patient was discharged from acute PT last week as she was basically independent in mobility with help for set up only. She walked the whole floor last week and today with her rollator and assistance only to unplug wound vac (she will have more portable one at home) and to push O2 tank (chronic 3L). Otherwise she is independent in ambulation. Ivory Hubbard PTA

## 2018-11-19 NOTE — PROGRESS NOTES
Warfarin dosing per pharmacist 
 
Daphnie Davis Jie Santos is a 77 y.o. female. Indication:  s/p AVR Goal INR:  2-3 Home dose:  5 mg (5 mg x 1) on Fri; 2.5 mg (5 mg x 0.5) all other days Risk factors or significant drug interactions:  -- Other anticoagulants:  -- 
 
Daily Monitoring Date  INR     Warfarin dose HGB              Notes 11/14  2.1  2.5 mg  9.4 
11/15  2.3  2.5 mg  9.8 
11/16  2.3  2.5 mg  11.1 
11/17  2  5 mg  ---- 
11/18  1.8     2.5 mg + 2.5 mg 11 
11/19  2.4  2.5 mg  9.9 Pharmacy consulted to assist in Ms. Corral's warfarin dosing and monitoring during this admission. Home dose per last anticoagulation note, is 5 mg Fridays and 2.5 mg all other days. INR up to 2.4 after extra 2.5 mg dose yesterday. Will continue with home dosing. Daily INR. Pharmacy will continue to follow. Please call with any questions. Thank you, Onesimo Zendejas, PharmD Clinical Pharmacist 
[phone]

## 2018-11-19 NOTE — PROGRESS NOTES
Pt assisted up to chair. Pt alert oriented times 3 at this time. Pt denies pain or distress at this time. Pt on 3 L NC at this time. Pt has wound vac with seal in place to left knee. Pt encouraged to call for assistance if needed call light in reach, door open will monitor.

## 2018-11-19 NOTE — PROGRESS NOTES
Bedside and Verbal shift change report given to Kelsey (oncoming nurse) by self, Charo Mclaughlin (offgoing nurse). Report included the following information SBAR, Kardex, MAR and Recent Results. Oncoming nurse assumed care. Patient resting in bed. No needs at this time, no s/s of distress.

## 2018-11-20 VITALS
BODY MASS INDEX: 39.38 KG/M2 | HEART RATE: 66 BPM | HEIGHT: 62 IN | TEMPERATURE: 98.5 F | WEIGHT: 214 LBS | OXYGEN SATURATION: 96 % | RESPIRATION RATE: 18 BRPM | SYSTOLIC BLOOD PRESSURE: 115 MMHG | DIASTOLIC BLOOD PRESSURE: 77 MMHG

## 2018-11-20 PROBLEM — R06.02 SOB (SHORTNESS OF BREATH): Status: RESOLVED | Noted: 2018-11-14 | Resolved: 2018-11-20

## 2018-11-20 LAB
ANION GAP SERPL CALC-SCNC: 6 MMOL/L (ref 7–16)
BUN SERPL-MCNC: 72 MG/DL (ref 8–23)
CALCIUM SERPL-MCNC: 9.5 MG/DL (ref 8.3–10.4)
CHLORIDE SERPL-SCNC: 99 MMOL/L (ref 98–107)
CO2 SERPL-SCNC: 32 MMOL/L (ref 21–32)
CREAT SERPL-MCNC: 1.8 MG/DL (ref 0.6–1)
GLUCOSE BLD STRIP.AUTO-MCNC: 142 MG/DL (ref 65–100)
GLUCOSE BLD STRIP.AUTO-MCNC: 173 MG/DL (ref 65–100)
GLUCOSE BLD STRIP.AUTO-MCNC: 316 MG/DL (ref 65–100)
GLUCOSE SERPL-MCNC: 126 MG/DL (ref 65–100)
INR PPP: 2.7
MAGNESIUM SERPL-MCNC: 2.5 MG/DL (ref 1.8–2.4)
POTASSIUM SERPL-SCNC: 4.1 MMOL/L (ref 3.5–5.1)
PROTHROMBIN TIME: 27.5 SEC (ref 11.5–14.5)
SODIUM SERPL-SCNC: 137 MMOL/L (ref 136–145)

## 2018-11-20 PROCEDURE — 74750000023 HC WOUND THERAPY

## 2018-11-20 PROCEDURE — 94640 AIRWAY INHALATION TREATMENT: CPT

## 2018-11-20 PROCEDURE — 80048 BASIC METABOLIC PNL TOTAL CA: CPT

## 2018-11-20 PROCEDURE — 77010033678 HC OXYGEN DAILY

## 2018-11-20 PROCEDURE — 74011250637 HC RX REV CODE- 250/637: Performed by: HOSPITALIST

## 2018-11-20 PROCEDURE — 74011636637 HC RX REV CODE- 636/637: Performed by: HOSPITALIST

## 2018-11-20 PROCEDURE — 74011000250 HC RX REV CODE- 250: Performed by: HOSPITALIST

## 2018-11-20 PROCEDURE — 3331090002 HH PPS REVENUE DEBIT

## 2018-11-20 PROCEDURE — 83735 ASSAY OF MAGNESIUM: CPT

## 2018-11-20 PROCEDURE — 74011250637 HC RX REV CODE- 250/637: Performed by: NURSE PRACTITIONER

## 2018-11-20 PROCEDURE — 74011636637 HC RX REV CODE- 636/637: Performed by: INTERNAL MEDICINE

## 2018-11-20 PROCEDURE — 97535 SELF CARE MNGMENT TRAINING: CPT

## 2018-11-20 PROCEDURE — 82962 GLUCOSE BLOOD TEST: CPT

## 2018-11-20 PROCEDURE — 85610 PROTHROMBIN TIME: CPT

## 2018-11-20 PROCEDURE — 97110 THERAPEUTIC EXERCISES: CPT

## 2018-11-20 PROCEDURE — 36415 COLL VENOUS BLD VENIPUNCTURE: CPT

## 2018-11-20 PROCEDURE — 3331090001 HH PPS REVENUE CREDIT

## 2018-11-20 RX ADMIN — LORATADINE 10 MG: 10 TABLET ORAL at 08:51

## 2018-11-20 RX ADMIN — PANTOPRAZOLE SODIUM 40 MG: 40 TABLET, DELAYED RELEASE ORAL at 06:31

## 2018-11-20 RX ADMIN — INSULIN LISPRO 8 UNITS: 100 INJECTION, SOLUTION INTRAVENOUS; SUBCUTANEOUS at 16:46

## 2018-11-20 RX ADMIN — IPRATROPIUM BROMIDE AND ALBUTEROL SULFATE 3 ML: .5; 3 SOLUTION RESPIRATORY (INHALATION) at 15:58

## 2018-11-20 RX ADMIN — ISOSORBIDE MONONITRATE 30 MG: 30 TABLET, EXTENDED RELEASE ORAL at 08:51

## 2018-11-20 RX ADMIN — PREDNISONE 40 MG: 20 TABLET ORAL at 08:51

## 2018-11-20 RX ADMIN — IPRATROPIUM BROMIDE AND ALBUTEROL SULFATE 3 ML: .5; 3 SOLUTION RESPIRATORY (INHALATION) at 08:44

## 2018-11-20 RX ADMIN — CARVEDILOL 12.5 MG: 12.5 TABLET, FILM COATED ORAL at 08:51

## 2018-11-20 RX ADMIN — NYSTATIN: 100000 POWDER TOPICAL at 08:52

## 2018-11-20 RX ADMIN — ESCITALOPRAM OXALATE 20 MG: 10 TABLET ORAL at 08:51

## 2018-11-20 RX ADMIN — BUDESONIDE 500 MCG: 0.5 INHALANT RESPIRATORY (INHALATION) at 08:44

## 2018-11-20 RX ADMIN — DOCUSATE SODIUM 100 MG: 100 CAPSULE, LIQUID FILLED ORAL at 08:51

## 2018-11-20 RX ADMIN — IPRATROPIUM BROMIDE AND ALBUTEROL SULFATE 3 ML: .5; 3 SOLUTION RESPIRATORY (INHALATION) at 11:31

## 2018-11-20 RX ADMIN — INSULIN LISPRO 2 UNITS: 100 INJECTION, SOLUTION INTRAVENOUS; SUBCUTANEOUS at 12:06

## 2018-11-20 NOTE — PROGRESS NOTES
Patient resting in bed in no apparent distress on 3L NC. Patient reports no pain. Bedside shift report to be given to oncoming nurse.

## 2018-11-20 NOTE — DISCHARGE SUMMARY
Hospitalist Discharge Summary Admit Date:  2018 10:12 AM  
Name:  Luis Kelly Age:  77 y.o. 
:  1950 MRN:  [id number] PCP:  Nicole Castorena MD 
Treatment Team: Attending Provider: Kaiden Alejo MD; Utilization Review: Reginold Bath; Care Manager: Ailyn Colon.; Hospitalist: Massimo Dow MD 
 
Problem List for this Hospitalization: 
Hospital Problems as of 2018 Date Reviewed: 2018 Codes Class Noted - Resolved POA * (Principal) Acute exacerbation of CHF (congestive heart failure) (HCC) ICD-10-CM: I50.9 ICD-9-CM: 428.0  2018 - Present Yes S/P AVR (aortic valve replacement) (Chronic) ICD-10-CM: Z95.2 ICD-9-CM: V43.3  Unknown - Present Yes Overview Signed 2016 11:04 AM by Kathie Gracia Mechanical/Artific HLD (hyperlipidemia) (Chronic) ICD-10-CM: S66.5 ICD-9-CM: 272.4  Unknown - Present Yes COPD (chronic obstructive pulmonary disease) (HCC) (Chronic) ICD-10-CM: J44.9 ICD-9-CM: 096  2014 - Present Yes Overview Signed 10/6/2018  8:56 PM by Camila Newton NP  
  HOME OXYGEN 3 LITERS 
  
  
   
 REJI (obstructive sleep apnea)-cpap (Chronic) ICD-10-CM: Q97.37 
ICD-9-CM: 327.23  2014 - Present Yes  
   
 CKD (chronic kidney disease) stage 3, GFR 30-59 ml/min (HCC) (Chronic) ICD-10-CM: N18.3 ICD-9-CM: 585.3  7/10/2013 - Present Yes Anticoagulated on Coumadin (Chronic) ICD-10-CM: Z51.81, Z79.01 
ICD-9-CM: V58.83, V58.61  2013 - Present Yes Overview Signed 2013  4:16 PM by Maday Mcintyre NP  
  S/P AVR 
  
  
   
 CAD (coronary artery disease) (Chronic) ICD-10-CM: I25.10 ICD-9-CM: 414.00  2013 - Present Yes Overview Signed 2014 10:05 AM by Tressa Martinez NP  
  14 PCI LAD with stent placed Chronic combined systolic and diastolic heart failure (HCC) (Chronic) ICD-10-CM: I50.42 
ICD-9-CM: 428.42  2013 - Present Yes Overview Addendum 4/28/2018  4:53 AM by Rajan Ag MD  
  5/8/14 ECHO:  EF 10-15% 12/2017:  EF 25-30% Essential hypertension, benign (Chronic) ICD-10-CM: I10 
ICD-9-CM: 401.1  1/20/2013 - Present Yes RESOLVED: SOB (shortness of breath) ICD-10-CM: R06.02 
ICD-9-CM: 786.05  11/14/2018 - 11/20/2018 Yes Admission HPI from 11/14/2018: Ms. Gladys Jones is a 78 yo female with PMH of CAD, cardiomyopathy, CKD stage 3, COPD on home trilogy at night, 3 L O2 daytime, ICD, Myelodysplastic syndrome, iron deficiency anemia, HTN, hyperlipidemia, pulmonary HTN, systolic CHF EF 30-95% 6670, mechanical aortic valve who presented with c/o 3-4 day hx of increasingly worsening SOB, worse with exertion. She denies fever, chills, recent weight gain, edema, CP, sick contacts, cough. CXR showed no acute findings. . Lactic acid, PCT normal range. Influenza neg. Pt received lasix IV and steroids in the ED. Hospital Course: 
Patient admitted on 11/14 with acute exacerbation of sCHF. LVEF 20-25%. Cardiology consulted. sCr 3 on admission and is 1.8 on the day of discharge. She is on her home dose of 3L NC O2 and ambulating in the halls without significant limitations. She feels remarkably better. Entresto and spironolactone are held at discharge. She may restart her Lasix at 40mg daily. INR is 2.7. She will have repeat INR/BMP next week and f/u appt with Dr. Reuben Velasquez. Otherwise her home medications will be continued. She will be able to resume Entresto as an outpatient once her renal function stabilizes. Follow up instructions and discharge meds at bottom of this note. Plan was discussed with patient, nursing. All questions answered. Patient was stable at time of discharge. Diagnostic Imaging/Tests:  
Xr Chest [facility] Result Date: 11/14/2018 Portable chest: History: sob.   COPD Comparison: 09/07/2018 Findings: A single view of the chest was obtained at 10:33 AM. A cardiac pacer/AICD device is present. Cardiac silhouette is moderately enlarged, unchanged. The lungs and pleural spaces are grossly clear. The pulmonary vascularity is within normal limits. Median sternotomy wires are present. Impression: Enlarged cardiac silhouette with grossly clear lungs. Echocardiogram results: 
Results for orders placed or performed during the hospital encounter of 18  
2D ECHO COMPLETE ADULT (TTE) W OR WO CONTR Narrative [place] [facility] [address] [place], [address] [facility] 
[phone] Transthoracic Echocardiogram 
2D, M-mode, Doppler, and Color Doppler Patient: Jose David Quinn 
MR #: [id number] : [id number] Age: 77 years Gender: Female Study date: 15-Nov-2018 Account #: [de-identified] Height: 62 in 
Weight: 220.4 lb 
BSA: 1.99 mï¾² Status:Routine Location: 825 BP: 144/ 82 Allergies: SULFA (SULFONAMIDE ANTIBIOTICS), ASPIRIN Sonographer:  Bere Bacon RDCS Group:  [facility] Referring Physician:  Fer Shearer. Raleigh Gomez NP Reading Physician:  Geeta Pizano MD [facility] INDICATIONS: CHF. PROCEDURE: This was a routine study. A transthoracic echocardiogram was 
performed. The study included complete 2D imaging, M-mode, complete spectral 
Doppler, and color Doppler. Intravenous contrast (Definity) was administered. Image quality was adequate. LEFT VENTRICLE: The ventricle was moderately dilated. Systolic function was 
markedly reduced. Ejection fraction was estimated in the range of 20 % to 25  
%. There was severe diffuse hypokinesis with worsening kinesis of the apical and 
septal walls. Wall thickness was mildly increased. Avg. E/e'= 25.45. Features 
were consitent with grade 2 diastolic dysfunction. RIGHT VENTRICLE: The ventricle was mildly dilated. Systolic function was reduced. The tricuspid jet envelope definition was inadequate for estimation  
of 
RV systolic pressure. LEFT ATRIUM: The atrium was markedly dilated. RIGHT ATRIUM: The atrium was mildly to moderately dilated. SYSTEMIC VEINS: IVC: The inferior vena cava was normal in size and course. AORTIC VALVE: A mechanical prosthesis was present. (2005). The peak velocity 
was 3.16 m/s. The mean pressure gradient was 20 mmHg. The peak pressure 
gradient was 40 mmHg. SVi: (23). Di: (0.24). AT: (79 ms). There was trace 
insufficiency. MITRAL VALVE: There was moderate calcification of the anterior and posterior 
leaflets. The peak velocity was 2.08 m/s. The mean pressure gradient was 7 
mmHg. The peak pressure gradient was 17 mmHg. There was mild regurgitation. TRICUSPID VALVE: The valve structure was normal. There was no evidence for 
stenosis. There was trivial regurgitation. PULMONIC VALVE: Not well visualized. There was no evidence for stenosis. There 
was no insufficiency. PERICARDIUM: There was no pericardial effusion. AORTA: The root exhibited normal size. SUMMARY: 
 
-  Left ventricle: The ventricle was moderately dilated. Systolic function  
was 
markedly reduced. Ejection fraction was estimated in the range of 20 % to 25  
%. There was severe diffuse hypokinesis with worsening kinesis of the apical and 
septal walls. Wall thickness was mildly increased. Avg. E/e'= 25.45. Features 
were consitent with grade 2 diastolic dysfunction. 
 
-  Right ventricle: The ventricle was mildly dilated. Systolic function was 
reduced. -  Left atrium: The atrium was markedly dilated. -  Right atrium: The atrium was mildly to moderately dilated. -  Aortic valve: A mechanical prosthesis was present. (2005). -  Mitral valve: There was moderate calcification of the anterior and  
posterior 
leaflets. There was mild regurgitation. SYSTEM MEASUREMENT TABLES 
 
2D mode AoR Diam (2D): 3.1 cm 
 Left Atrium Systolic Volume Index; Method of Disks, Biplane; 2D mode;: 60.8 
ml/m2 IVS/LVPW (2D): 1.2 IVSd (2D): 1.2 cm LVIDd (2D): 6.2 cm LVIDs (2D): 5.6 cm 
LVOT Area (2D): 3.1 cm2 LVPWd (2D): 1 cm RVIDd (2D): 2.4 cm Unspecified Scan Mode Peak Grad; Mean; Antegrade Flow: 34 mm[Hg] Vmax; Antegrade Flow: 283 cm/s LVOT Diam: 2 cm Peak Grad; Mean; Antegrade Flow: 16 mm[Hg] Vmax; Antegrade Flow: 193 cm/s Prepared and signed by 
 
Eula Smith MD [facility] Signed 15-Nov-2018 11:20:35 All Micro Results Procedure Component Value Units Date/Time CULTURE, BLOOD [[id number]] Collected:  11/14/18 1034 Order Status:  Completed Specimen:  Blood Updated:  11/19/18 7811 Special Requests: --     
  LEFT Antecubital 
  
  Culture result: NO GROWTH 5 DAYS     
 CULTURE, BLOOD [[id number]]  (Abnormal) Collected:  11/14/18 1159 Order Status:  Completed Specimen:  Blood Updated:  11/17/18 1095 Special Requests: --     
  RIGHT 
ARM 
  
  GRAM STAIN    
  GRAM POS COCCI IN CLUSTERS  
     
   ANAEROBIC BOTTLE POSITIVE RESULTS VERIFIED, PHONED TO AND READ BACK BY Benedicto Munguia @ 2121 ON 11/15/18 BY STEFANIE. Culture result: STAPHYLOCOCCUS SPECIES, COAGULASE NEGATIVE  
     
      
  THIS ORGANISM MAY BE INDICATIVE OF CULTURE CONTAMINATION, HOWEVER, CLINICAL CORRELATION NEEDS TO BE EVALUATED, AS EACH CASE IS UNIQUE. INFLUENZA A & B AG (RAPID TEST) [[id number]] Collected:  11/14/18 1138 Order Status:  Completed Specimen:  Nasopharyngeal from Nasal washing Updated:  11/14/18 1158 Influenza A Ag NEGATIVE Comment: NEGATIVE FOR THE PRESENCE OF INFLUENZA A ANTIGEN 
INFECTION DUE TO INFLUENZA A CANNOT BE RULED OUT. BECAUSE THE ANTIGEN PRESENT IN THE SAMPLE MAY BE BELOW 
THE DETECTION LIMIT OF THE TEST. A NEGATIVE TEST IS PRESUMPTIVE AND IT IS RECOMMENDED THAT THESE RESULTS BE CONFIRMED BY VIRAL CULTURE OR AN FDA-CLEARED INFLUENZA A AND B MOLECULAR ASSAY. Influenza B Ag NEGATIVE Comment: NEGATIVE FOR THE PRESENCE OF INFLUENZA B ANTIGEN 
INFECTION DUE TO INFLUENZA B CANNOT BE RULED OUT. BECAUSE THE ANTIGEN PRESENT IN THE SAMPLE MAY BE BELOW 
THE DETECTION LIMIT OF THE TEST. A NEGATIVE TEST IS PRESUMPTIVE AND IT IS RECOMMENDED THAT THESE RESULTS BE CONFIRMED BY VIRAL CULTURE OR AN FDA-CLEARED INFLUENZA A AND B MOLECULAR ASSAY. Source NASOPHARYNGEAL Labs: Results:  
   
BMP, Mg, Phos Recent Labs  
  11/20/18 
0548 11/19/18 
0628 11/18/18 
0719  134* 135* K 4.1 4.4 4.0  
CL 99 96* 96* CO2 32 31 30 AGAP 6* 7 9 BUN 72* 79* 80* CREA 1.80* 2.20* 2.78* CA 9.5 9.4 9.1 * 175* 144* MG 2.5* 2.5* 2.4  
  
CBC Recent Labs 11/19/18 
3624 11/18/18 
0719 WBC 6.7 9.0  
RBC 3.53* 3.89* HGB 9.9* 11.0*  
HCT 30.7* 33.9*  
 263 LFT No results for input(s): SGOT, ALT, TBIL, AP, TP, ALB, GLOB, AGRAT, GPT in the last 72 hours. Cardiac Testing Lab Results Component Value Date/Time BNP 1,611 11/15/2018 02:36 AM  
  11/14/2018 10:48 AM  
  02/15/2017 01:09 PM  
  02/16/2016 06:04 AM  
  06/02/2014 04:15 AM  
 B-type Natriuretic Peptide 250.7 (H) 02/25/2016 03:38 PM  
 CK 47 05/15/2014 07:46 AM  
 CK 54 04/14/2014 02:40 PM  
 CK 55 01/20/2013 11:15 AM  
 CK - MB 0.9 02/18/2010 04:27 AM  
 CK - MB <0.5 02/17/2010 10:30 PM  
 CK-MB Index 2.9 (H) 02/18/2010 04:27 AM  
 CK-MB Index CANNOT BE CALCULATED 02/17/2010 10:30 PM  
 Troponin-I <0.05 01/31/2012 03:32 PM  
 Troponin-I, Qt. 0.08 (H) 12/01/2016 02:20 AM  
 Troponin-I, Qt. 0.07 (H) 02/16/2016 06:04 AM  
 Troponin-I, Qt. 0.25 (H) 05/09/2014 02:55 AM  
  
Coagulation Tests Lab Results Component Value Date/Time  Prothrombin time 27.5 (H) 11/20/2018 05:48 AM  
 Prothrombin time 24.7 (H) 11/19/2018 06:28 AM  
 Prothrombin time 19.9 (H) 11/18/2018 07:19 AM  
 INR 2.7 11/20/2018 05:48 AM  
 INR 2.4 11/19/2018 06:28 AM  
 INR 1.8 11/18/2018 07:19 AM  
 aPTT 60.2 (H) 10/10/2018 04:02 AM  
 aPTT 56.2 (H) 10/09/2018 04:14 AM  
 aPTT 55.0 (H) 10/08/2018 04:26 AM  
  
A1c Lab Results Component Value Date/Time Hemoglobin A1c 6.1 (H) 11/19/2018 06:28 AM  
 Hemoglobin A1c 5.7 (H) 08/21/2018 11:57 AM  
 Hemoglobin A1c 5.8 (H) 03/27/2018 09:16 AM  
 Hemoglobin A1c, External 6.4 06/09/2015 Lipid Panel Lab Results Component Value Date/Time Cholesterol, total 133 08/21/2018 11:57 AM  
 HDL Cholesterol 63 08/21/2018 11:57 AM  
 LDL, calculated 59 08/21/2018 11:57 AM  
 VLDL, calculated 11 08/21/2018 11:57 AM  
 Triglyceride 54 08/21/2018 11:57 AM  
 CHOL/HDL Ratio 2.7 02/16/2017 05:15 AM  
  
Thyroid Panel Lab Results Component Value Date/Time TSH 2.620 11/16/2018 06:22 AM  
 TSH 3.060 11/28/2017 10:48 AM  
    
Most Recent UA Lab Results Component Value Date/Time Color YELLOW 10/08/2018 07:50 PM  
 Appearance CLOUDY 10/08/2018 07:50 PM  
 Specific gravity 1.010 10/08/2018 07:50 PM  
 pH (UA) 5.5 10/08/2018 07:50 PM  
 Protein NEGATIVE  10/08/2018 07:50 PM  
 Glucose NEGATIVE  10/08/2018 07:50 PM  
 Ketone NEGATIVE  10/08/2018 07:50 PM  
 Bilirubin NEGATIVE  10/08/2018 07:50 PM  
 Blood NEGATIVE  10/08/2018 07:50 PM  
 Urobilinogen 0.2 10/08/2018 07:50 PM  
 Nitrites NEGATIVE  10/08/2018 07:50 PM  
 Leukocyte Esterase NEGATIVE  10/08/2018 07:50 PM  
 WBC 0 08/08/2013 08:35 PM  
 RBC 0-3 08/08/2013 08:35 PM  
 Epithelial cells 0 08/08/2013 08:35 PM  
 Bacteria 0 08/08/2013 08:35 PM  
 Casts 0 08/08/2013 08:35 PM  
 Crystals, urine 0 01/31/2012 09:15 PM  
 Mucus 0 01/31/2012 09:15 PM  
  
 
Allergies Allergen Reactions  Sulfa (Sulfonamide Antibiotics) Hives  Aspirin Nausea Only Cannot take uncoated aspirin Immunization History Administered Date(s) Administered  Influenza High Dose Vaccine PF 10/01/2016, 09/01/2017, 10/17/2018  Influenza Vaccine 01/01/2013, 10/01/2014, 09/01/2015  Pneumococcal Conjugate (PCV-13) 07/17/2015  Pneumococcal Vaccine (Unspecified Type) 01/01/2013  TB Skin Test (PPD) Intradermal 05/26/2014, 04/29/2018, 09/09/2018, 10/06/2018, 11/14/2018 All Labs from Last 24 Hrs: 
Recent Results (from the past 24 hour(s)) GLUCOSE, POC Collection Time: 11/19/18  4:08 PM  
Result Value Ref Range Glucose (POC) 250 (H) 65 - 100 mg/dL GLUCOSE, POC Collection Time: 11/19/18  9:35 PM  
Result Value Ref Range Glucose (POC) 203 (H) 65 - 100 mg/dL GLUCOSE, POC Collection Time: 11/20/18  5:27 AM  
Result Value Ref Range Glucose (POC) 142 (H) 65 - 100 mg/dL PROTHROMBIN TIME + INR Collection Time: 11/20/18  5:48 AM  
Result Value Ref Range Prothrombin time 27.5 (H) 11.5 - 14.5 sec INR 2.7 METABOLIC PANEL, BASIC Collection Time: 11/20/18  5:48 AM  
Result Value Ref Range Sodium 137 136 - 145 mmol/L Potassium 4.1 3.5 - 5.1 mmol/L Chloride 99 98 - 107 mmol/L  
 CO2 32 21 - 32 mmol/L Anion gap 6 (L) 7 - 16 mmol/L Glucose 126 (H) 65 - 100 mg/dL BUN 72 (H) 8 - 23 MG/DL Creatinine 1.80 (H) 0.6 - 1.0 MG/DL  
 GFR est AA 36 (L) >60 ml/min/1.73m2 GFR est non-AA 30 (L) >60 ml/min/1.73m2 Calcium 9.5 8.3 - 10.4 MG/DL MAGNESIUM Collection Time: 11/20/18  5:48 AM  
Result Value Ref Range Magnesium 2.5 (H) 1.8 - 2.4 mg/dL GLUCOSE, POC Collection Time: 11/20/18 11:25 AM  
Result Value Ref Range Glucose (POC) 173 (H) 65 - 100 mg/dL Current Med List in Hospital:  
Current Facility-Administered Medications Medication Dose Route Frequency  predniSONE (DELTASONE) tablet 40 mg  40 mg Oral DAILY WITH BREAKFAST  warfarin (COUMADIN) tablet 2.5 mg  2.5 mg Oral Once per day on Sun Mon Tue Wed Thu Sat  And  
 [START ON 11/23/2018] warfarin (COUMADIN) tablet 5 mg  5 mg Oral Once per day on Fri  
 nystatin (MYCOSTATIN) 100,000 unit/mL oral suspension 500,000 Units  500,000 Units Oral QID  insulin lispro (HUMALOG) injection   SubCUTAneous AC&HS  carvedilol (COREG) tablet 12.5 mg  12.5 mg Oral BID WITH MEALS  docusate sodium (COLACE) capsule 100 mg  100 mg Oral DAILY  pantoprazole (PROTONIX) tablet 40 mg  40 mg Oral ACB  hydrALAZINE (APRESOLINE) tablet 25 mg  25 mg Oral Q6H PRN  
 nystatin (MYCOSTATIN) 100,000 unit/gram powder   Topical BID  hydrALAZINE (APRESOLINE) 20 mg/mL injection 10 mg  10 mg IntraVENous Q6H PRN  
 escitalopram oxalate (LEXAPRO) tablet 20 mg  20 mg Oral DAILY  isosorbide mononitrate ER (IMDUR) tablet 30 mg  30 mg Oral DAILY  loratadine (CLARITIN) tablet 10 mg  10 mg Oral DAILY  simvastatin (ZOCOR) tablet 20 mg  20 mg Oral QHS  ondansetron (ZOFRAN) injection 4 mg  4 mg IntraVENous Q6H PRN  
 acetaminophen (TYLENOL) tablet 650 mg  650 mg Oral Q6H PRN  
 albuterol-ipratropium (DUO-NEB) 2.5 MG-0.5 MG/3 ML  3 mL Nebulization QID RT  
 albuterol-ipratropium (DUO-NEB) 2.5 MG-0.5 MG/3 ML  3 mL Nebulization Q4H PRN  
 budesonide (PULMICORT) 500 mcg/2 ml nebulizer suspension  500 mcg Nebulization BID RT Discharge Exam: 
Patient Vitals for the past 24 hrs: 
 Temp Pulse Resp BP SpO2  
11/20/18 1131     99 % 11/20/18 1123 98.5 °F (36.9 °C) 66 18 115/77 99 % 11/20/18 0844     99 % 11/20/18 0717 98 °F (36.7 °C) 69 17 135/73 100 % 11/20/18 0402 97.6 °F (36.4 °C) (!) 55 18 138/81 100 % 11/20/18 0400  67     
11/20/18 0031 97.6 °F (36.4 °C) 60 18 132/68 99 % 11/19/18 2044 97.9 °F (36.6 °C) (!) 57 18 98/62 99 % 11/19/18 2012     99 % 11/19/18 1622     97 % 11/19/18 1524 98.5 °F (36.9 °C) 74 17 128/75 93 % Oxygen Therapy O2 Sat (%): 99 % (11/20/18 1131) Pulse via Oximetry: 84 beats per minute (11/20/18 1131) O2 Device: Nasal cannula (11/20/18 1131) O2 Flow Rate (L/min): 3 l/min (11/20/18 1131) FIO2 (%): 32 % (11/17/18 0545) Intake/Output Summary (Last 24 hours) at 11/20/2018 1414 Last data filed at 11/20/2018 3343 Gross per 24 hour Intake 354 ml Output 200 ml Net 154 ml *Note that automatically entered I/Os may not be accurate; dependent on patient compliance with collection and accurate  by assistants. General:    Well nourished. Alert. Eyes:   Normal sclera. Extraocular movements intact. ENT:  Normocephalic, atraumatic. Moist mucous membranes CV:   Regular rate and rhythm. No murmur, rub, or gallop. Lungs:  Clear to auscultation bilaterally. No wheezing, rhonchi, or rales. 3L NC O2. Abdomen: Soft, nontender, nondistended. Bowel sounds normal.  
Extremities: Warm and dry. No cyanosis or edema. Wound vac to LLE. Neurologic: CN II-XII grossly intact. Sensation intact. Skin:     No rashes or jaundice. Psych:  Normal mood and affect. Discharge Info:  
Current Discharge Medication List  
  
CONTINUE these medications which have NOT CHANGED Details  
fluticasone-vilanterol (BREO ELLIPTA) 200-25 mcg/dose inhaler Take 1 Puff by inhalation daily. Qty: 1 Inhaler, Refills: 11  
  
simvastatin (ZOCOR) 20 mg tablet Take  by mouth nightly. OTHER Trilogy  
  
mometasone-formoterol (DULERA) 200-5 mcg/actuation HFA inhaler 2 puffs bid, rinse mouth after use. Qty: 3 Inhaler, Refills: 3 Associated Diagnoses: Pulmonary hypertension ([address] Chronic respiratory failure with hypoxia (HCC)  
  
isosorbide mononitrate ER (IMDUR) 30 mg tablet Take 30 mg by mouth daily. aspirin delayed-release 81 mg tablet Take 81 mg by mouth.  
  
warfarin (COUMADIN) 2.5 mg tablet Take 2.5 mg by mouth nightly. Needs 5mg on Friday  
  
docusate sodium (COLACE) 50 mg capsule Take 50 mg by mouth daily. carvedilol (COREG) 6.25 mg tablet Take 1 Tab by mouth two (2) times daily (with meals). Qty: 60 Tab, Refills: 3 Associated Diagnoses: Anemia, unspecified type; Acute kidney injury superimposed on chronic kidney disease ([facility] Coronary artery disease involving native coronary artery of native heart without angina pectoris; Chronic combined systolic and diastolic heart failure ([facility] [address] Chronic respiratory failure with hypoxia ([facility] [address] Debility; MDS (myelodysplastic syndrome) ([facility] [address] Type 2 diabetes mellitus with nephropathy ([facility] [address] Essential hypertension, benign; Anticoagulated on Coumadin; CKD (chronic kidney disease) stage 3, GFR 30-59 ml/min ([facility] [address] Chronic obstructive pulmonary disease, unspecified COPD type ([facility] [address] REJI (obstructive sleep apnea); Diabetes mellitus type 2, diet-controlled ([facility] ICD (implantable cardioverter-defibrillator) in place; S/P AVR (aortic valve replacement); Obesity, morbid ([facility] [facility] Coagulopathy ([facility] Traumatic hematoma of left knee, sequela; Iron deficiency anemia due to chronic blood loss; Osteopenia, unspecified location; Mixed hyperlipidemia; Osteoarthritis of shoulder, unspecified laterality, unspecified osteoarthritis type; Cardiomyopathy, unspecified type ([facility] [address] [facility] Pulmonary hypertension ([facility] [address] Dysthymia; Long term (current) use of anticoagulants; Subdural hematoma ([facility] [facility] Closed nondisplaced fracture of shaft of fifth metacarpal bone of left hand, sequela; Physical debility; Encounter for immunization  
  
furosemide (LASIX) 40 mg tablet Take 1 Tab by mouth daily. Qty: 30 Tab, Refills: 6 Associated Diagnoses: Essential hypertension, benign  
  
traZODone (DESYREL) 50 mg tablet Take 0.5-1 Tabs by mouth nightly. Qty: 60 Tab, Refills: 2 Associated Diagnoses: REJI (obstructive sleep apnea)  
  
traMADol (ULTRAM) 50 mg tablet TAKE 1 TABLET BY MOUTH EVERY 4 HOURS AS NEEDED Qty: 180 Tab, Refills: 1 Comments: Not to exceed 4 additional fills before 03/07/2018 Associated Diagnoses: Osteoarthritis of shoulder, unspecified laterality, unspecified osteoarthritis type escitalopram oxalate (LEXAPRO) 20 mg tablet TAKE 1 TAB BY MOUTH DAILY. INDICATIONS: ANXIETY WITH DEPRESSION Qty: 90 Tab, Refills: 4 Associated Diagnoses: Dysthymia  
  
multivit-minerals/folic acid (ADULT ONE DAILY MULTIVITAMIN PO) Take 1 Tab by mouth daily. ascorbic acid, vitamin C, (VITAMIN C) 500 mg tablet Take 500 mg by mouth daily. ferrous gluconate 324 mg (38 mg iron) tablet TAKE 1 TAB BY MOUTH DAILY (BEFORE BREAKFAST). INDICATIONS: IRON DEFICIENCY ANEMIA Qty: 90 Tab, Refills: 3 Associated Diagnoses: Iron deficiency anemia due to chronic blood loss  
  
fluticasone (FLONASE) 50 mcg/actuation nasal spray 2 Sprays by Both Nostrils route daily as needed. Qty: 3 Bottle, Refills: 3  
  
loratadine (CLARITIN) 10 mg tablet TAKE 1 TAB BY MOUTH DAILY. Qty: 30 Tab, Refills: 1 Associated Diagnoses: Upper respiratory tract infection, unspecified type  
  
albuterol (PROVENTIL VENTOLIN) 2.5 mg /3 mL (0.083 %) nebulizer solution 3 mL by Nebulization route every four (4) hours as needed for Wheezing. Qty: 120 Each, Refills: 11  
 Associated Diagnoses: Pulmonary hypertension ([address] Chronic respiratory failure with hypoxia (HCC)  
  
albuterol (VENTOLIN HFA) 90 mcg/actuation inhaler 2 puffs qid prn 
Qty: 1 Inhaler, Refills: 11  
 Associated Diagnoses: Pulmonary hypertension ([address] Chronic respiratory failure with hypoxia (HCC)  
  
cholecalciferol, VITAMIN D3, (VITAMIN D3) 5,000 unit tab tablet Take 1 Tab by mouth daily. Qty: 90 Tab, Refills: 4  
  
acetaminophen (TYLENOL) 325 mg tablet Take 650 mg by mouth every four (4) hours as needed for Pain. OXYGEN-AIR DELIVERY SYSTEMS 3 L by Nasal route continuous. nitroglycerin (NITROSTAT) 0.4 mg SL tablet 0.4 mg by SubLINGual route every five (5) minutes as needed for Chest Pain. STOP taking these medications  
  
 spironolactone (ALDACTONE) 25 mg tablet Comments:  
Reason for Stopping: sacubitril-valsartan (ENTRESTO) 24 mg/26 mg tablet Comments:  
Reason for Stopping:   
   
  
 
 
 
Disposition: home with  (for wound vac management) Activity: Activity as tolerated Diet: DIET CARDIAC Regular; Consistent Carb 1500-1600kcal; FR 1200ML Follow-up Appointments Procedures  FOLLOW UP VISIT Appointment in: Other (Specify) PCP and cardiologist in 1 week Cardiologist for repeat INR/BMP in 1 week PCP and cardiologist in 1 week Cardiologist for repeat INR/BMP in 1 week Standing Status:   Standing Number of Occurrences:   1 Order Specific Question:   Appointment in Answer: Other (Specify) Follow-up Information Follow up With Specialties Details Why [facility] Info [facility]   [facility] [address] [address] 
[phone] [facility]  We will follow up with you after discharge regarding Cardiac Rehab. [address] [place] [address] 
[phone] Avinash Sandhu MD Cardiology Schedule an appointment as soon as possible for a visit in 1 week hospital follow up, acute [facility]F exacerbation [facility] [address] [facility] [address] 
[phone] Meg Fields MD Internal Medicine   [facility] [address] [facility] [address] 
[phone] Time spent in patient discharge planning and coordination 35 minutes.  
 
Signed: 
Fiorella Rutherford MD

## 2018-11-20 NOTE — PROGRESS NOTES
[facility] CARDIOLOGY PROGRESS NOTE 
 
11/20/2018 7:54 AM 
 
Admit Date: 11/14/2018 Admit Diagnosis: SOB (shortness of breath); Acute exacerbation of CHF (congestive heart failure) ([facility] Subjective:  
Stable overnight without angina, CHF, or palpitations. Vitals stable and controlled. No other complaints overnight. Tolerating meds well. Objective:  
  
Vitals:  
 11/20/18 0031 11/20/18 0400 11/20/18 0402 11/20/18 6107 BP: 132/68  138/81 135/73 Pulse: 60 67 (!) 55 69 Resp: 18 18 17 Temp: 97.6 °F (36.4 °C)  97.6 °F (36.4 °C) 98 °F (36.7 °C) SpO2: 99%  100% 100% Weight:   97.1 kg (214 lb) Height:      
 
 
Physical Exam: 
Neck- supple, no JVD sitting upright CV- regular rate and rhythm, crisp AVR click, soft ONEIL RUSB Lung- clear bilaterally Abd- soft, nontender, nondistended Ext- no edema Skin- warm and dry Data Review:  
Recent Labs  
  11/20/18 
0548 11/19/18 
0628 11/18/18 
0719  134* 135* K 4.1 4.4 4.0  
MG 2.5* 2.5* 2.4 BUN 72* 79* 80* CREA 1.80* 2.20* 2.78* * 175* 144* WBC  --  6.7 9.0 HGB  --  9.9* 11.0*  
HCT  --  30.7* 33.9*  
PLT  --  224 263 INR 2.7 2.4 1.8 Assessment and Plan:  
 
 
Principal Problem: 
  Acute exacerbation of CHF (congestive heart failure) ([facility] (11/14/2018)- improved, lying flat comfortably, no/minimal edema, renal function nearing baseline, see below 
  
Active Problems: 
  SOB (shortness of breath) (11/14/2018)- improved, comfortable at rest and lying flat without orthopnea, see above.  
  
 Acute renal failure- hypotension, ARB and lasix on board at the time- over weekend we stopped diuretics, held entresto, hold coreg and imdur until systolic>100-110, recheck in AM- await labs from this AM 
  
Mechanical AVR- stable, continue warfarin, check INR QAM 
  
COPD- stable, no wheezing or SOB at present, follow clinically 
  
Stable from volume standpoint, renal function slowly improving.  Continue meds as currently taking, BP and renal function will probably not tolerate entresto yet. Continue current meds, resume lasix as taking at home but hold entresto and aldactone at discharge. I will arrange follow up for and INR on Monday and with Dr. Myke Guzmán within a week after discharge. We will be on standby and follow from a distance. Please call if any further assistance is needed or if any new questions arise. Thanks for the consult. Heather Muir MD 
[facility] Cardiology Pager [phone]

## 2018-11-20 NOTE — PROGRESS NOTES
Patient alert and oriented x4 in no apparent distress on 3L NC. Patient reports no needs at this time, bilateral breath sounds diminished. Bed in low, locked position with call light in reach. Door open, will continue to monitor.

## 2018-11-20 NOTE — PROGRESS NOTES
Pt sitting up in chair eating breakfast. Pt alert oriented times 3 at this time. Pt denies pain or distress at this time. Pt on 3 L NC At this time. Pt has wound vac to left knee with seal intact. Pt encouraged to call for assistance if needed call light in reach, door open will monitor.

## 2018-11-20 NOTE — DISCHARGE INSTRUCTIONS
Heart Failure: Care Instructions  Your Care Instructions    Heart failure occurs when your heart does not pump as much blood as the body needs. Failure does not mean that the heart has stopped pumping but rather that it is not pumping as well as it should. Over time, this causes fluid buildup in your lungs and other parts of your body. Fluid buildup can cause shortness of breath, fatigue, swollen ankles, and other problems. By taking medicines regularly, reducing sodium (salt) in your diet, checking your weight every day, and making lifestyle changes, you can feel better and live longer. Follow-up care is a key part of your treatment and safety. Be sure to make and go to all appointments, and call your doctor if you are having problems. It's also a good idea to know your test results and keep a list of the medicines you take. How can you care for yourself at home? Medicines    · Be safe with medicines. Take your medicines exactly as prescribed. Call your doctor if you think you are having a problem with your medicine.     · Do not take any vitamins, over-the-counter medicine, or herbal products without talking to your doctor first. Chichi Quiver not take ibuprofen (Advil or Motrin) and naproxen (Aleve) without talking to your doctor first. They could make your heart failure worse.     · You may be taking some of the following medicine. ? Beta-blockers can slow heart rate, decrease blood pressure, and improve your condition. Taking a beta-blocker may lower your chance of needing to be hospitalized. ? Angiotensin-converting enzyme inhibitors (ACEIs) reduce the heart's workload, lower blood pressure, and reduce swelling. Taking an ACEI may lower your chance of needing to be hospitalized again. ? Angiotensin II receptor blockers (ARBs) work like ACEIs. Your doctor may prescribe them instead of ACEIs. ? Diuretics, also called water pills, reduce swelling. ?  Potassium supplements replace this important mineral, which is sometimes lost with diuretics. ? Aspirin and other blood thinners prevent blood clots, which can cause a stroke or heart attack.    You will get more details on the specific medicines your doctor prescribes. Diet    · Your doctor may suggest that you limit sodium to 2,000 milligrams (mg) a day or less. That is less than 1 teaspoon of salt a day, including all the salt you eat in cooking or in packaged foods. People get most of their sodium from processed foods. Fast food and restaurant meals also tend to be very high in sodium.     · Ask your doctor how much liquid you can drink each day. You may have to limit liquids.    Weight    · Weigh yourself without clothing at the same time each day. Record your weight. Call your doctor if you have a sudden weight gain, such as more than 2 to 3 pounds in a day or 5 pounds in a week. (Your doctor may suggest a different range of weight gain.) A sudden weight gain may mean that your heart failure is getting worse.    Activity level    · Start light exercise (if your doctor says it is okay). Even if you can only do a small amount, exercise will help you get stronger, have more energy, and manage your weight and your stress. Walking is an easy way to get exercise. Start out by walking a little more than you did before. Bit by bit, increase the amount you walk.     · When you exercise, watch for signs that your heart is working too hard. You are pushing yourself too hard if you cannot talk while you are exercising. If you become short of breath or dizzy or have chest pain, stop, sit down, and rest.     · If you feel \"wiped out\" the day after you exercise, walk slower or for a shorter distance until you can work up to a better pace.     · Get enough rest at night. Sleeping with 1 or 2 pillows under your upper body and head may help you breathe easier.    Lifestyle changes    · Do not smoke. Smoking can make a heart condition worse.  If you need help quitting, talk to your doctor about stop-smoking programs and medicines. These can increase your chances of quitting for good. Quitting smoking may be the most important step you can take to protect your heart.     · Limit alcohol to 2 drinks a day for men and 1 drink a day for women. Too much alcohol can cause health problems.     · Avoid getting sick from colds and the flu. Get a pneumococcal vaccine shot. If you have had one before, ask your doctor whether you need another dose. Get a flu shot each year. If you must be around people with colds or the flu, wash your hands often. When should you call for help? Call 911 if you have symptoms of sudden heart failure such as:    · You have severe trouble breathing.     · You cough up pink, foamy mucus.     · You have a new irregular or rapid heartbeat.    Call your doctor now or seek immediate medical care if:    · You have new or increased shortness of breath.     · You are dizzy or lightheaded, or you feel like you may faint.     · You have sudden weight gain, such as more than 2 to 3 pounds in a day or 5 pounds in a week. (Your doctor may suggest a different range of weight gain.)     · You have increased swelling in your legs, ankles, or feet.     · You are suddenly so tired or weak that you cannot do your usual activities.    Watch closely for changes in your health, and be sure to contact your doctor if you develop new symptoms. Where can you learn more? Go to http://macrina-thuy.info/. Enter U592 in the search box to learn more about \"Heart Failure: Care Instructions. \"  Current as of: December 6, 2017  Content Version: 11.8  © 3839-3153 [employer]. Care instructions adapted under license by [employer] (which disclaims liability or warranty for this information).  If you have questions about a medical condition or this instruction, always ask your healthcare professional. [address] any warranty or liability for your use of this information. DISCHARGE SUMMARY from Nurse    PATIENT INSTRUCTIONS:    After general anesthesia or intravenous sedation, for 24 hours or while taking prescription Narcotics:  · Limit your activities  · Do not drive and operate hazardous machinery  · Do not make important personal or business decisions  · Do  not drink alcoholic beverages  · If you have not urinated within 8 hours after discharge, please contact your surgeon on call. Report the following to your surgeon:  · Excessive pain, swelling, redness or odor of or around the surgical area  · Temperature over 100.5  · Nausea and vomiting lasting longer than 4 hours or if unable to take medications  · Any signs of decreased circulation or nerve impairment to extremity: change in color, persistent  numbness, tingling, coldness or increase pain  · Any questions    What to do at Home:    *  Please give a list of your current medications to your Primary Care Provider. *  Please update this list whenever your medications are discontinued, doses are      changed, or new medications (including over-the-counter products) are added. *  Please carry medication information at all times in case of emergency situations. These are general instructions for a healthy lifestyle:    No smoking/ No tobacco products/ Avoid exposure to second hand smoke  Surgeon General's Warning:  Quitting smoking now greatly reduces serious risk to your health. Obesity, smoking, and sedentary lifestyle greatly increases your risk for illness    A healthy diet, regular physical exercise & weight monitoring are important for maintaining a healthy lifestyle    You may be retaining fluid if you have a history of heart failure or if you experience any of the following symptoms:  Weight gain of 3 pounds or more overnight or 5 pounds in a week, increased swelling in our hands or feet or shortness of breath while lying flat in bed.   Please call your doctor as soon as you notice any of these symptoms; do not wait until your next office visit. Recognize signs and symptoms of STROKE:    F-face looks uneven    A-arms unable to move or move unevenly    S-speech slurred or non-existent    T-time-call 911 as soon as signs and symptoms begin-DO NOT go       Back to bed or wait to see if you get better-TIME IS BRAIN. Warning Signs of HEART ATTACK     Call 911 if you have these symptoms:   Chest discomfort. Most heart attacks involve discomfort in the center of the chest that lasts more than a few minutes, or that goes away and comes back. It can feel like uncomfortable pressure, squeezing, fullness, or pain.  Discomfort in other areas of the upper body. Symptoms can include pain or discomfort in one or both arms, the back, neck, jaw, or stomach.  Shortness of breath with or without chest discomfort.  Other signs may include breaking out in a cold sweat, nausea, or lightheadedness. Don't wait more than five minutes to call 911 - MINUTES MATTER! Fast action can save your life. Calling 911 is almost always the fastest way to get lifesaving treatment. Emergency Medical Services staff can begin treatment when they arrive -- up to an hour sooner than if someone gets to the hospital by car. The discharge information has been reviewed with the patient. The patient verbalized understanding. Discharge medications reviewed with the patient and appropriate educational materials and side effects teaching were provided.   ___________________________________________________________________________________________________________________________________

## 2018-11-20 NOTE — PROGRESS NOTES
Warfarin dosing per pharmacist 
 
Leila Brooks Sarah Chowdary is a 77 y.o. female. Indication:  s/p AVR Goal INR:  2-3 Home dose:  5 mg (5 mg x 1) on Fri; 2.5 mg (5 mg x 0.5) all other days Risk factors or significant drug interactions:  -- Other anticoagulants:  -- 
 
Daily Monitoring Date  INR     Warfarin dose HGB              Notes 11/14  2.1  2.5 mg  9.4 
11/15  2.3  2.5 mg  9.8 
11/16  2.3  2.5 mg  11.1 
11/17  2  5 mg  ---- 
11/18  1.8     2.5 mg + 2.5 mg 11 
11/19  2.4  2.5 mg  9.9 
11/20  2.7  2.5 mg  --- Pharmacy consulted to assist in Ms. Corral's warfarin dosing and monitoring during this admission. Home dose per last anticoagulation note, is 5 mg Fridays and 2.5 mg all other days. INR continues to increase, now up to 2.7. Increase in INR likely explained from 5 mg dose x 2 days recently. Will continue home dose of warfarin 2.5 mg for now. May need to decrease dose tomorrow if INR continues to rise though expect current INR values due to previously increased warfarin doses. Pharmacy will continue to follow. Please call with any questions. Thank you, Miranda Verdin, PharmD, [facility] Clinical Pharmacist 
[phone]

## 2018-11-20 NOTE — PROGRESS NOTES
Problem: Self Care Deficits Care Plan (Adult) Goal: *Acute Goals and Plan of Care (Insert Text) 1. Pt will increase right shoulder AROM to 130 degrees for increased function in self care. 2.  Pt will stand at mirror and groom self with supervision Timeframe:  3 visits OCCUPATIONAL THERAPY: Daily Note, Treatment Day: 2nd and AM 11/20/2018 INPATIENT: Hospital Day: 7 [place] / Product Type: Medicare /  
  
NAME/AGE/GENDER: Ernestine Finnegan is a 77 y.o. female PRIMARY DIAGNOSIS:  SOB (shortness of breath) Acute exacerbation of CHF (congestive heart failure) ([facility]) Acute exacerbation of CHF (congestive heart failure) ([facility]) Acute exacerbation of CHF (congestive heart failure) ([facility] ICD-10: Treatment Diagnosis:  
 · Generalized Muscle Weakness (M62.81) · Other lack of cordination (R27.8) Precautions/Allergies:  Fall risk due to tubes and poor vision. Right shoulder with decreased range and tightness Sulfa (sulfonamide antibiotics) and Aspirin ASSESSMENT:  
 
 Pt seen sitting up in chiar with wound vac. Pt completed grooming at sink with supervision. Pt competed functional mobility in room with CGA. Pt completed the exercises below on B UE's. Pt is progressing towards goals. Continue POC. This section established at most recent assessment PROBLEM LIST (Impairments causing functional limitations): 1. Decreased Strength 2. Decreased ADL/Functional Activities 3. Decreased Activity Tolerance 4. Increased Shortness of Breath INTERVENTIONS PLANNED: (Benefits and precautions of occupational therapy have been discussed with the patient.) 1. Activities of daily living training 2. Therapeutic activity 3. Therapeutic exercise TREATMENT PLAN: Frequency/Duration: Follow patient 3 X per weekto address above goals. Rehabilitation Potential For Stated Goals: Excellent RECOMMENDED REHABILITATION/EQUIPMENT: (at time of discharge pending progress): Due to the probability of continued deficits (see above) this patient will likely need continued skilled occupational therapy after discharge. Equipment:  
? None at this time. Pt has rollator walker and tub seat. OCCUPATIONAL PROFILE AND HISTORY:  
History of Present Injury/Illness (Reason for Referral): 
  See H and P Past Medical History/Comorbidities: Ms. Diaz  has a past medical history of Anticoagulated on Coumadin, CAD (coronary artery disease), Cardiomyopathy ([address] CHF (congestive heart failure) ([address] CKD (chronic kidney disease) stage 3, GFR 30-59 ml/min ([facility]), COPD (chronic obstructive pulmonary disease) ([address] Essential hypertension, benign, HLD (hyperlipidemia), ICD (implantable cardioverter-defibrillator) in place, Iron deficiency anemia due to chronic blood loss, MDS (myelodysplastic syndrome) ([address] REJI (obstructive sleep apnea)-cpap, Osteoarthritis, Osteopenia, Pulmonary hypertension ([address] Quadrantanopia, Skin defect, and Visual complaint. Ms. Diaz  has a past surgical history that includes cardiac catheterization (); ir bx bone marrow diagnostic (2011); hx aortic valve replacement (, ); hx  section; hx tubal ligation; hx heart catheterization (); hx coronary stent placement (May, 2014); hx pacemaker (10/2/2014); hx endoscopy (2009); IMPLANTATION OF CARDIOVERTER DEFIBRILLATOR (N/A, 10/2/2014); BRONCHOSCOPY (N/A, 2014); and ANESTHESIA FOR COLONOSCOPY AND ESOPHAGOGASTRODUODENOSCOPY (N/A, 2012). Social History/Living Environment:  
Home Environment: Private residence # Steps to Enter: 1 One/Two Story Residence: One story Living Alone: Yes Support Systems: Family member(s) Patient Expects to be Discharged to[de-identified] Private residence Current DME Used/Available at Home: [employer] Prior Level of Function/Work/Activity: Pt lived at home alone with daughter and family support. Daughter helped pt bathe, but when pt alone she was able to manage. Pt received pre-made meals that were easy for her to microwave. Dominant Side:  
      RIGHT Number of Personal Factors/Comorbidities that affect the Plan of Care: Expanded review of therapy/medical records (1-2):  MODERATE COMPLEXITY ASSESSMENT OF OCCUPATIONAL PERFORMANCE[de-identified]  
Activities of Daily Living:  
Basic ADLs (From Assessment) Complex ADLs (From Assessment) Feeding: Independent Oral Facial Hygiene/Grooming: Setup Bathing: Minimum assistance(daughter helps with bathing at home due to safety isssues) Upper Body Dressing: Supervision Lower Body Dressing: Supervision Toileting: Supervision Grooming/Bathing/Dressing Activities of Daily Living Grooming Brushing Teeth: Supervision/set-up Cognitive Retraining Safety/Judgement: Fall prevention Most Recent Physical Functioning:  
Gross Assessment: 
  
         
  
Posture: 
  
Balance: 
Sitting: Intact Standing: With support;Pull to stand Bed Mobility: 
  
Wheelchair Mobility: 
  
Transfers: 
   
 
    
 
Patient Vitals for the past 6 hrs: 
 BP BP Patient Position SpO2 O2 Flow Rate (L/min) Pulse 11/20/18 0717 135/73 At rest 100 %  69  
11/20/18 0844   99 % 3 l/min   
11/20/18 1123 115/77  99 %  66  
11/20/18 1131   99 % 3 l/min  Mental Status Neurologic State: Alert Orientation Level: Oriented X4 Cognition: Appropriate decision making, Appropriate for age attention/concentration Perception: Appears intact Perseveration: No perseveration noted Safety/Judgement: Fall prevention Physical Skills Involved: 1. Range of Motion 2. Strength 3. Activity Tolerance 4. Vision Cognitive Skills Affected (resulting in the inability to perform in a timely and safe manner): 1. NA Psychosocial Skills Affected: 1. Habits/Routines 2. Social Roles Number of elements that affect the Plan of Care: 3-5:  MODERATE COMPLEXITY CLINICAL DECISION MAKING:  
[employer] AM-PAC 6 Clicks Daily Activity Inpatient Short Form How much help from another person does the patient currently need. .. Total A Lot A Little None 1. Putting on and taking off regular lower body clothing? [] 1   [] 2   [x] 3   [] 4  
2. Bathing (including washing, rinsing, drying)? [] 1   [] 2   [x] 3   [] 4  
3. Toileting, which includes using toilet, bedpan or urinal?   [] 1   [] 2   [x] 3   [] 4  
4. Putting on and taking off regular upper body clothing? [] 1   [] 2   [] 3   [x] 4  
5. Taking care of personal grooming such as brushing teeth? [] 1   [] 2   [] 3   [x] 4  
6. Eating meals? [] 1   [] 2   [] 3   [x] 4  
© 2007, Trustees of [employer], under license to [employer]. All rights reserved Score:  Initial: 21 Most Recent: X (Date: -- ) Interpretation of Tool:  Represents activities that are increasingly more difficult (i.e. Bed mobility, Transfers, Gait). Score 24 23 22-20 19-15 14-10 9-7 6 Modifier CH CI CJ CK CL CM CN   
 
? Self Care:  
  - CURRENT STATUS: CJ - 20%-39% impaired, limited or restricted  - GOAL STATUS: CI - 1%-19% impaired, limited or restricted  - D/C STATUS:  CI - 1%-19% impaired, limited or restricted [place] / Product Type: Medicare /   
 
Medical Necessity:    
· Patient demonstrates good rehab potential due to higher previous functional level. Reason for Services/Other Comments: 
· Patient continues to require skilled intervention due to medical complications. Use of outcome tool(s) and clinical judgement create a POC that gives a: MODERATE COMPLEXITY  
 
 
 
TREATMENT:  
(In addition to Assessment/Re-Assessment sessions the following treatments were rendered) Pre-treatment Symptoms/Complaints:   
Pain: Initial:  
   Post Session:  2 to 3  
 
 Self Care: (10): Procedure(s) (per grid) utilized to improve and/or restore self-care/home management as related to grooming. Required min verbal cueing to facilitate activities of daily living skills. Therapeutic Exercise: ( 14):  Exercises per grid below to improve mobility and strength. Required min verbal cues to promote proper body posture and promote proper body mechanics. Progressed range as indicated. B UE's  Date: 
11/20/18 Date: 
 Date: Activity/Exercise Parameters Parameters Parameters Shoulder flexion/extension  10 reps Shoulder horizontal abduction/adduction  10 reps Elbow flexion/extension  10 reps Supination/pronation  10 reps Wrist flexion/extension  10 reps Braces/Orthotics/Lines/Etc:  
· IV · wound vac · O2 Treatment/Session Assessment:   
· Response to Treatment:  Pt pleasant and cooperative · Interdisciplinary Collaboration:  
o Certified Occupational Therapy Assistant 
o Registered Nurse · After treatment position/precautions:  
o Up in chair 
o Bed/Chair-wheels locked 
o Bed in low position 
o Call light within reach 
o RN notified · Compliance with Program/Exercises: Compliant all of the time. · Recommendations/Intent for next treatment session: \"Next visit will focus on advancements to more challenging activities\". Total Treatment Duration: OT Patient Time In/Time Out Time In: 1006 Time Out: 1030 Blanca Molina

## 2018-11-21 ENCOUNTER — PATIENT OUTREACH (OUTPATIENT)
Dept: CASE MANAGEMENT | Age: 68
End: 2018-11-21

## 2018-11-21 PROCEDURE — 3331090002 HH PPS REVENUE DEBIT

## 2018-11-21 PROCEDURE — 3331090001 HH PPS REVENUE CREDIT

## 2018-11-21 NOTE — PROGRESS NOTES
Transition of Care Discharge Follow-up Questionnaire Date/Time of Call: 
 November 21, 2018 11:04AM  
What was the patient hospitalized for? Acute exacerbation of CHF (congestive heart failure). Does the patient understand his/her diagnosis and/or treatment and what happened during the hospitalization? Care Coordinator spoke with patient Mrs. Claudia Campos who agreed to [facility]itions of [employer]. Patient states understanding of diagnosis and treatment plan during hospitalization. Did the patient receive discharge instructions? Yes  
CM Assessed Risk for Readmission:  
 
 
 
 
 
Patient stated Risk for Readmission: Low to Moderate risk for readmit related to complications from Acute exacerbation of CHF (congestive heart failure) and other comorbidities. Patient states she has a wound vac and will be receiving home health wound care. Review any discharge instructions (see discharge instructions/AVS in [facility]). Ask patient if they understand these. Do they have any questions? Care Coordinator reviewed discharge medications, diet and discharge instructions with patient who verbalized understanding of discharge instructions. Patient educated on the importance of medication compliance and reporting medication side effects to PCP/Specialist.  
  
Were home services ordered (nursing, PT, OT, ST, etc.)? Yes, home health services resumed with [facility] (RN/PT). If so, has the first visit occurred? If not, why? (Assist with coordination of services if necessary.) No- Care Coordinator called [facility] @ [phone] spoke with Gale () who informed Care Coordinator that home health nurse will call patient tomorrow 11/22/2018 with time of home health appointment. Was any DME ordered? No durable medical equipment ordered. If so, has it been received? If not, why?  (Assist patient in obtaining DME orders &/or equipment if necessary. ) HEIDY 
 
 
  
 Complete a review of all medications (new, continued and discontinued meds per the D/C instructions and medication tab in [facility]). Care Coordinator reviewed all medications with patient per connect [facility] who verbalized understanding of medications. No new medications ordered. Were all new prescriptions filled? If not, why?  (Assist patient in obtaining medications if necessary  escalate for CCM &/or SW if ongoing issues are verbalized by pt or anticipated) NA Does the patient understand the purpose and dosing instructions for all medications? (If patient has questions, provide explanation and education.) Yes Does the patient have any problems in performing ADLs? (If patient is unable to perform ADLs  what is the limiting factor(s)? Do they have a support system that can assist? If no support system is present, discuss possible assistance that they may be able to obtain. Escalate for CCM/SW if ongoing issues are verbalized by pt or anticipated) Patient states she is independent with ADL's and ambulation but requires assistance due to her limited sight. Patient states she has a caregiver that comes daily for 3 hours each day to assist her with ADL's. Patient states she also has supportive family  That will assist her if needed. Does the patient have all follow-up appointments scheduled? 7 day f/up with PCP?  
(f/up with PCP may be w/in 14 days if patient has a f/up with their specialist w/in 7 days) 7-14 day f/up with specialist?  
(or per discharge instructions) If f/up has not been made  what actions has the care coordinator made to accomplish this? Has transportation been arranged? Yes 
 
 
11/29/2018 10:00 AM Chad Davis MD [address] [place] [address] Internal Medicine 11/26/2018 12:45 PM Evan Pagan MD [address] [place] [address] Yes Any other questions or concerns expressed by the patient? No further needs identified: Patient instructed to call Care Coordinator if further questions or concerns arise Schedule next appointment with VENECIA Soto or refer to RN Case Manager/ per the workflow guidelines. When is care coordinators next follow-up call scheduled? If referred for CCM  what RN care manager was the referral assigned? NA 
 
 
 
 
1 week-Care Coordinator provided contact information if assistance is needed for concerns or questions. Patient offered CCM referral for frequent ED/Inpatient admissions. Patient declined and states she has a caregiver and home health services and is able to manage. YASMINE Call Completed By: ANUM Emmanuel ACO Community Care Coordinator This note will not be viewable in [address]

## 2018-11-22 ENCOUNTER — HOME CARE VISIT (OUTPATIENT)
Dept: SCHEDULING | Facility: HOME HEALTH | Age: 68
End: 2018-11-22
Payer: MEDICARE

## 2018-11-22 VITALS
HEART RATE: 70 BPM | SYSTOLIC BLOOD PRESSURE: 110 MMHG | TEMPERATURE: 98 F | DIASTOLIC BLOOD PRESSURE: 70 MMHG | RESPIRATION RATE: 18 BRPM | OXYGEN SATURATION: 98 %

## 2018-11-22 PROCEDURE — G0299 HHS/HOSPICE OF RN EA 15 MIN: HCPCS

## 2018-11-22 PROCEDURE — 3331090002 HH PPS REVENUE DEBIT

## 2018-11-22 PROCEDURE — 3331090003 HH PPS REVENUE ADJ

## 2018-11-22 PROCEDURE — 3331090001 HH PPS REVENUE CREDIT

## 2018-11-23 ENCOUNTER — TELEPHONE (OUTPATIENT)
Dept: HOME HEALTH SERVICES | Facility: HOME HEALTH | Age: 68
End: 2018-11-23

## 2018-11-23 PROCEDURE — 3331090001 HH PPS REVENUE CREDIT

## 2018-11-23 PROCEDURE — 3331090002 HH PPS REVENUE DEBIT

## 2018-11-23 NOTE — TELEPHONE ENCOUNTER
[address] [place] [address] Venus Simon Pharmacist consult. I spoke with Asuncion Adams when I called the home number. She is the CG, but not there right now. She gave me another number to call. I was able to reach Mrs. Corral. She said she was doing well today. I asked to review her medications, but she refused as she had no new medications and was well versed in her maintenance meds. She could not take my number because she said she could not see well to write it. She said I could call back and any time was good.

## 2018-11-24 PROCEDURE — 3331090002 HH PPS REVENUE DEBIT

## 2018-11-24 PROCEDURE — 3331090001 HH PPS REVENUE CREDIT

## 2018-11-25 PROCEDURE — 3331090002 HH PPS REVENUE DEBIT

## 2018-11-25 PROCEDURE — 3331090001 HH PPS REVENUE CREDIT

## 2018-11-26 ENCOUNTER — HOME CARE VISIT (OUTPATIENT)
Dept: SCHEDULING | Facility: HOME HEALTH | Age: 68
End: 2018-11-26
Payer: MEDICARE

## 2018-11-26 ENCOUNTER — PATIENT OUTREACH (OUTPATIENT)
Dept: CASE MANAGEMENT | Age: 68
End: 2018-11-26

## 2018-11-26 VITALS
DIASTOLIC BLOOD PRESSURE: 84 MMHG | OXYGEN SATURATION: 95 % | RESPIRATION RATE: 18 BRPM | SYSTOLIC BLOOD PRESSURE: 132 MMHG | TEMPERATURE: 99.2 F | HEART RATE: 78 BPM

## 2018-11-26 VITALS
SYSTOLIC BLOOD PRESSURE: 132 MMHG | OXYGEN SATURATION: 97 % | HEART RATE: 80 BPM | TEMPERATURE: 97.7 F | RESPIRATION RATE: 16 BRPM | DIASTOLIC BLOOD PRESSURE: 64 MMHG

## 2018-11-26 PROCEDURE — 400016 HH ROC

## 2018-11-26 PROCEDURE — G0299 HHS/HOSPICE OF RN EA 15 MIN: HCPCS

## 2018-11-26 PROCEDURE — G0151 HHCP-SERV OF PT,EA 15 MIN: HCPCS

## 2018-11-26 PROCEDURE — 3331090002 HH PPS REVENUE DEBIT

## 2018-11-26 PROCEDURE — 3331090001 HH PPS REVENUE CREDIT

## 2018-11-26 NOTE — ACP (ADVANCE CARE PLANNING)
Patient Care Setting Care Setting Type: [facility] Patient Care Setting: Home with [address], [facility] to follow. [place] Date: 11/20/18 Patient Care Plan: [facility]  to follow up with home visits in addition to home health.

## 2018-11-27 ENCOUNTER — TELEPHONE (OUTPATIENT)
Dept: HOME HEALTH SERVICES | Facility: HOME HEALTH | Age: 68
End: 2018-11-27

## 2018-11-27 PROCEDURE — 3331090001 HH PPS REVENUE CREDIT

## 2018-11-27 PROCEDURE — 3331090002 HH PPS REVENUE DEBIT

## 2018-11-27 NOTE — TELEPHONE ENCOUNTER
Mrs. Dorohty Rocha said she was doing good today. She saw Dr. Cydney Hodges yesterday and got a good report. He resumed her Sloane Purple which had been discontinued in the hospital.  He felt she needed it. I reviewed her meds. She had taken the Sloane Purple in the past with no issues, so felt it would be OK resuming. No questions at this time. I asked her to call with any medication issues.

## 2018-11-28 ENCOUNTER — PATIENT OUTREACH (OUTPATIENT)
Dept: RESPIRATORY THERAPY | Age: 68
End: 2018-11-28

## 2018-11-28 ENCOUNTER — HOME CARE VISIT (OUTPATIENT)
Dept: SCHEDULING | Facility: HOME HEALTH | Age: 68
End: 2018-11-28
Payer: MEDICARE

## 2018-11-28 VITALS
DIASTOLIC BLOOD PRESSURE: 60 MMHG | WEIGHT: 206 LBS | RESPIRATION RATE: 16 BRPM | OXYGEN SATURATION: 98 % | SYSTOLIC BLOOD PRESSURE: 98 MMHG | HEART RATE: 60 BPM | BODY MASS INDEX: 37.68 KG/M2 | TEMPERATURE: 98.6 F

## 2018-11-28 PROCEDURE — 3331090001 HH PPS REVENUE CREDIT

## 2018-11-28 PROCEDURE — G0299 HHS/HOSPICE OF RN EA 15 MIN: HCPCS

## 2018-11-28 PROCEDURE — 3331090002 HH PPS REVENUE DEBIT

## 2018-11-28 NOTE — PROGRESS NOTES
Home Visit # 1 Initial Visit Health  arrived at residence to find patient in care of her home health RN who was completing a dressing change to her wound vac. Patient is alert and oriented in on acute distress. Patient states that she has felt better today. Saw Cardiologist yesterday. Health  to follow with weekly home visits. Lung Sounds: Equal and clear bilaterally Weight: 204 lb down from 206 on 11/26 Edema: Pedal edema is minimal and abdomen is soft and non-tender. B/P 98/60 LA Sitting Follow Up Appointments: 
11/29  1000  Dr Ash Villalba PCP Transportation: Family/ Service Medications: All medications are on hand. Daughter manages them and fills pill minder. There are two different concentrations of Coreg. A 3.125 concentration listed as two tabs twice daily and a 6.25 listed as one tab twice daily. Informed the patient in an attempt to avoid mis-dosing. Patient advised that she would advise the daughter. Education: CHF action plan, low sodium diet, fluid restrictions, trigger avoidance and early interventions explained with patient voicing understanding. DME: Patient has Trilogy NIV and 02 concentrator that are in good working order. Safety Concerns: Poss. Of mis-dosing Coreg Patient/Family Concerns: None Tasks: None Physical Assessment completed and noted on CHF assessment Form. Help line discussed. Will follow up with pt in one week via home visit. End of Visit. Ricardo Mancuso [facility]

## 2018-11-29 ENCOUNTER — PATIENT OUTREACH (OUTPATIENT)
Dept: CASE MANAGEMENT | Age: 68
End: 2018-11-29

## 2018-11-29 PROCEDURE — 3331090001 HH PPS REVENUE CREDIT

## 2018-11-29 PROCEDURE — 3331090002 HH PPS REVENUE DEBIT

## 2018-11-29 NOTE — PROGRESS NOTES
Transitions of Care Discharge Follow-up Questionnaire Note  
Date/Time of Call: 
 November 29, 2018 2:08PM  
Has patient attended PCP or specialist follow-up appointments since last contact? What was outcome of appointment? When is next follow-up scheduled? Yes- Follow up with [address] [place] [address] Office 11/26/2018. Patient states follow up appointment went well and all her questions and concerns were answered. 12/18/2018 10:00 AM Bharat Reynolds MD [address] Internal Medicine Review medications. Any medication changes since last outreach? Does patient have any questions or issues related to their medications? Yes Yes- sacubitril-valsartan (ENTRESTO) 24 mg/26 mg tablet -Take 1 Tab by mouth two (2) times a day. None- patient is very knowledgeable about current medications and dosing instructions. Home health active? If yes  any issue? Progress? Yes- (PT/OT) Patient states home health is going great and PT/OT staff doing a great job. Referrals needed? 
(CM, SW, HH, etc. ) None- Patient is being followed by Health  Other issues/Miscellaneous? (Transportation, access to meals, ability to perform ADLs, adequate caregiver support, etc.) None Next Outreach Scheduled? Graduation from program? 
 No further needs identified: Patient instructed to call Care Coordinator if further questions or concerns arise NA Next Steps/Goals (if applicable): 
 NA Outreach completed by: 
 ANUM Sam ACO Care Coordinator This note will not be viewable in [address]

## 2018-11-30 ENCOUNTER — HOME CARE VISIT (OUTPATIENT)
Dept: SCHEDULING | Facility: HOME HEALTH | Age: 68
End: 2018-11-30
Payer: MEDICARE

## 2018-11-30 PROCEDURE — 3331090002 HH PPS REVENUE DEBIT

## 2018-11-30 PROCEDURE — 3331090001 HH PPS REVENUE CREDIT

## 2018-11-30 PROCEDURE — G0299 HHS/HOSPICE OF RN EA 15 MIN: HCPCS

## 2018-12-01 PROCEDURE — 3331090001 HH PPS REVENUE CREDIT

## 2018-12-01 PROCEDURE — 3331090002 HH PPS REVENUE DEBIT

## 2018-12-02 PROCEDURE — 3331090001 HH PPS REVENUE CREDIT

## 2018-12-02 PROCEDURE — 3331090002 HH PPS REVENUE DEBIT

## 2018-12-03 ENCOUNTER — HOME CARE VISIT (OUTPATIENT)
Dept: SCHEDULING | Facility: HOME HEALTH | Age: 68
End: 2018-12-03
Payer: MEDICARE

## 2018-12-03 PROCEDURE — G0299 HHS/HOSPICE OF RN EA 15 MIN: HCPCS

## 2018-12-03 PROCEDURE — 3331090002 HH PPS REVENUE DEBIT

## 2018-12-03 PROCEDURE — 3331090001 HH PPS REVENUE CREDIT

## 2018-12-04 ENCOUNTER — HOSPITAL ENCOUNTER (OUTPATIENT)
Dept: INFUSION THERAPY | Age: 68
Discharge: HOME | End: 2018-12-04
Payer: MEDICARE

## 2018-12-04 VITALS
TEMPERATURE: 98.5 F | HEART RATE: 76 BPM | DIASTOLIC BLOOD PRESSURE: 47 MMHG | BODY MASS INDEX: 38.78 KG/M2 | SYSTOLIC BLOOD PRESSURE: 111 MMHG | RESPIRATION RATE: 16 BRPM | OXYGEN SATURATION: 93 % | WEIGHT: 212 LBS

## 2018-12-04 DIAGNOSIS — D50.0 IRON DEFICIENCY ANEMIA DUE TO CHRONIC BLOOD LOSS: ICD-10-CM

## 2018-12-04 DIAGNOSIS — D46.9 MDS (MYELODYSPLASTIC SYNDROME) (HCC): Primary | ICD-10-CM

## 2018-12-04 LAB
FERRITIN SERPL-MCNC: 76 NG/ML (ref 8–388)
HGB BLD-MCNC: 8.4 G/DL (ref 11.7–15.4)
IRON SATN MFR SERPL: 20 %
IRON SERPL-MCNC: 45 UG/DL (ref 35–150)
TIBC SERPL-MCNC: 227 UG/DL (ref 250–450)

## 2018-12-04 PROCEDURE — 74011250636 HC RX REV CODE- 250/636: Performed by: NURSE PRACTITIONER

## 2018-12-04 PROCEDURE — 85018 HEMOGLOBIN: CPT

## 2018-12-04 PROCEDURE — 36415 COLL VENOUS BLD VENIPUNCTURE: CPT

## 2018-12-04 PROCEDURE — 96372 THER/PROPH/DIAG INJ SC/IM: CPT

## 2018-12-04 PROCEDURE — 83540 ASSAY OF IRON: CPT

## 2018-12-04 PROCEDURE — 3331090001 HH PPS REVENUE CREDIT

## 2018-12-04 PROCEDURE — 82728 ASSAY OF FERRITIN: CPT

## 2018-12-04 PROCEDURE — 3331090002 HH PPS REVENUE DEBIT

## 2018-12-04 RX ADMIN — ERYTHROPOIETIN 60000 UNITS: 40000 INJECTION, SOLUTION INTRAVENOUS; SUBCUTANEOUS at 14:08

## 2018-12-04 NOTE — PROGRESS NOTES
Arrived to the [facility]. Procrit completed. Patient tolerated well. Any issues or concerns during appointment: none. Patient aware of next infusion appointment on 12/26/18 at 1400. Discharged via wheelchair.  
 
Robby Thomas RN

## 2018-12-05 ENCOUNTER — PATIENT OUTREACH (OUTPATIENT)
Dept: RESPIRATORY THERAPY | Age: 68
End: 2018-12-05

## 2018-12-05 ENCOUNTER — HOME CARE VISIT (OUTPATIENT)
Dept: SCHEDULING | Facility: HOME HEALTH | Age: 68
End: 2018-12-05
Payer: MEDICARE

## 2018-12-05 VITALS
SYSTOLIC BLOOD PRESSURE: 126 MMHG | RESPIRATION RATE: 18 BRPM | TEMPERATURE: 97.9 F | HEART RATE: 65 BPM | OXYGEN SATURATION: 96 % | DIASTOLIC BLOOD PRESSURE: 68 MMHG

## 2018-12-05 PROCEDURE — 3331090001 HH PPS REVENUE CREDIT

## 2018-12-05 PROCEDURE — G0299 HHS/HOSPICE OF RN EA 15 MIN: HCPCS

## 2018-12-05 PROCEDURE — 3331090002 HH PPS REVENUE DEBIT

## 2018-12-05 NOTE — PROGRESS NOTES
Contacted patient to advise that I was running late but on my way, when patient advised that she had an appointment with her PCP@ 2:15. Asked if the appointment could be moved.  Scheduled visit for Friday @ 11am.

## 2018-12-06 ENCOUNTER — TELEPHONE (OUTPATIENT)
Dept: HOME HEALTH SERVICES | Facility: HOME HEALTH | Age: 68
End: 2018-12-06

## 2018-12-06 ENCOUNTER — HOSPITAL ENCOUNTER (OUTPATIENT)
Dept: WOUND CARE | Age: 68
Discharge: HOME | End: 2018-12-06
Attending: SURGERY
Payer: MEDICARE

## 2018-12-06 VITALS
OXYGEN SATURATION: 90 % | DIASTOLIC BLOOD PRESSURE: 91 MMHG | RESPIRATION RATE: 20 BRPM | WEIGHT: 213.8 LBS | HEIGHT: 62 IN | BODY MASS INDEX: 39.34 KG/M2 | TEMPERATURE: 98.2 F | SYSTOLIC BLOOD PRESSURE: 150 MMHG | HEART RATE: 80 BPM

## 2018-12-06 DIAGNOSIS — Z79.01 ANTICOAGULATED ON COUMADIN: Chronic | ICD-10-CM

## 2018-12-06 DIAGNOSIS — L98.9 SKIN DEFECT: ICD-10-CM

## 2018-12-06 DIAGNOSIS — S81.802D OPEN WOUND OF LEFT LOWER EXTREMITY WITH COMPLICATION, SUBSEQUENT ENCOUNTER: Primary | ICD-10-CM

## 2018-12-06 DIAGNOSIS — S80.02XS TRAUMATIC HEMATOMA OF LEFT KNEE, SEQUELA: Chronic | ICD-10-CM

## 2018-12-06 DIAGNOSIS — Z79.01 LONG TERM (CURRENT) USE OF ANTICOAGULANTS: Chronic | ICD-10-CM

## 2018-12-06 DIAGNOSIS — E11.9 DIABETES MELLITUS TYPE 2, DIET-CONTROLLED (HCC): Chronic | ICD-10-CM

## 2018-12-06 PROCEDURE — 3331090001 HH PPS REVENUE CREDIT

## 2018-12-06 PROCEDURE — 15271 SKIN SUB GRAFT TRNK/ARM/LEG: CPT | Performed by: SURGERY

## 2018-12-06 PROCEDURE — 3331090002 HH PPS REVENUE DEBIT

## 2018-12-06 PROCEDURE — 15272 SKIN SUB GRAFT T/A/L ADD-ON: CPT | Performed by: SURGERY

## 2018-12-06 PROCEDURE — 15271 SKIN SUB GRAFT TRNK/ARM/LEG: CPT

## 2018-12-06 NOTE — PROGRESS NOTES
12/06/18 1557 Wound Knee Anterior; Left Date First Assessed/Time First Assessed: 10/18/18 1505   POA: Yes  Wound Type: Trauma  Location: Knee  Wound Description (Optional): #1  Orientation: Anterior; Left DRESSING STATUS Clean, dry, and intact DRESSING TYPE Negative pressure wound therapy Non-Pressure Injury Full thickness (subcut/muscle) Wound Length (cm) 4.2 cm Wound Width (cm) 7.3 cm Wound Depth (cm) 0.1 Wound Surface area (cm^2) 30.66 cm^2 Change in Wound Size % 45.5 Condition of Base Granulation Drainage Amount  Moderate Drainage Color Sanguinous Wound Odor None Cleansing and Cleansing Agents  Normal saline

## 2018-12-06 NOTE — PROCEDURES
Wound Center Application of Skin Substitute Patient: Maxwell Good MRN: [id number]  SSN: [id number] YOB: 1950  Age: 77 y.o. Sex: female December 6, 2018 Time Out Taken: Yes Procedure Performed By: Ananth Wilhelm MD 
 
Product Preparation - 's Guidelines Followed. Wound: 1 Left  Non Pressure  Knee Breakdown of Skin Application 2 out of 10 Product Applied: Puraply Application Area: 14.95 sq cm Amount Applied: 25 sq cm Amount Wasted: None Reconstituted with Normal Saline. Lot Number: [id number] Expiration Date: 01/12/2020 Fenestrated: No 
 
Affixed: Mepilex transfer Dressing Applied: Mepilex transfer, dry gauze bolster, Mepilex transfer, Exudry, Coban 2 Procedural Pain: Insensate Post-Procedural Pain: Insensate Response to Procedure: tolerated the procedure well with no complications Patient instructed to keep wound area dry. Only change outer dressing if needed. Return to wound center in 1 week. Signed By: Carla Monte MD  
 December 6, 2018

## 2018-12-06 NOTE — PROGRESS NOTES
WOUND CENTER Consult Note/Progress Note:  
Stacie Lawrence  MRN: [id number]  RNQ:7/2/8531  Age:68 y.o. 
 
HPI: Stacie Lawrence is a 77 y.o. female who is a 77 y.o. female \"with extensive medical history as noted below on chronic coumadin who initially presented to ER 9/7 after fall in bathroom 8/28 where she landed directly on her left knee with complaints of dizziness and fatigue and found to have Hgb of 6.7.  She was transfused to Hgb of 9.6.  She also was found with a large hematoma/bullae over her left knee and INR of 4.3.  She underwent an I+D per Dr Zhang Issa on 9/11 with a large amount of serosanguinous fluid evacuated. Paris Valdez has been followed by home health PT, OT and wound care since discharge from rehab on ninth floor 9/11-9/21. Paris Valdez has done well except the wound has not closed.  Two days ago, Henrik Garvey RN and daughter who is an RN noted mild erythema and heat surrounding upper half of wound.  Open area approx 5-6 cm in circumference.  No purulent drainage, oozing small amounts of dark blood with slightly foul odor.  Full ROM to knee.  All distal circulation, motion and sensation WNL for patient.  No distal edema, mild local edema.  Multiple old scars from prior lower extremity wounds.  Patient denies pain to area, fever, systemic symptoms.  She does not appear septic and lactic acid is 1.8.  WBC is 11.6 without shift.  Wound cultured in ER prior to administration of antibiotics.  Begun on vancomycin and ceftriaxone until cultures resulted. Celso Alejandra, attentive family at bedside. Creatinine 1.48 on admission with baseline of 1.2.  Of note, patient reports diarrhea x 4 days without use of antibiotics, additional GI symptoms. \"   
  
  
10/7/18: Pt sitting up on side of the bed just finishing breakfast. No complaints. AF, NAD. WBC 8.8, Pt taking Coumadin 2.5mg qhs. INR 3.0. 
10/8/18: POD1 s/p left knee debridement; awake in bed, no complaints. AF, VSS. Cx growing P. Mirabilis. 10/9/18: POD2 s/p left knee debridement; awake in chair, no complaints. AF, VSS. Cx growing P. Mirabilis. Vac placed today and functioning without leak 10/10/18: POD3 s/p left knee debridement; awake in chair, no complaints. AF, VSS. Cx growing P. Mirabilis. Vac functioning without leak or bleeding This information was obtained from the patient The following HPI elements were documented for the patient's wound: 
Location: L below knee anterior Duration: 8/28/2018 Severity:  No pain Context:  Fall in bathroom on coumadin, secondary infection Modifying Factors:  Nothing makes better or worse. Improved with debridement on 10/7/2018 and VAC Quality:  n/a Timing:  n/a Associated Signs and Symptoms: 
 
 
 
Past Medical History:  
Diagnosis Date  Anticoagulated on Coumadin 7/9/2013 S/P AVR  CAD (coronary artery disease) 1/20/2013 5/8/14 PCI LAD with stent placed  Cardiomyopathy ([address]  CHF (congestive heart failure) ([address] 2/17/2017  CKD (chronic kidney disease) stage 3, GFR 30-59 ml/min ([facility]) 7/10/2013  COPD (chronic obstructive pulmonary disease) ([address] 4/2/2014  Essential hypertension, benign 1/20/2013  HLD (hyperlipidemia)  ICD (implantable cardioverter-defibrillator) in place 10/2/2014 Biotronik single-chamber ICD implantation 10/20/14  Iron deficiency anemia due to chronic blood loss 7/29/2009  MDS (myelodysplastic syndrome) ([address] 12/17/2011 Procrit started in August, 2011 12/18/11 Procrit weekly and Iron stores. 5-12 12-13-12 good response to 3 weekly procrit did not need it last time  3-7-13 Pt doing well. Just wanted a \"check-up. \" Responding to Procrit every three weeks. 8-29-13 patient has missed some injections on recently restarted hemoglobin only issue , takes oral iron iron stores each time  REJI (obstructive sleep apnea)-cpap 4/2/2014  Osteoarthritis  Osteopenia  Pulmonary hypertension ([address] 6/15/2016  Quadrantanopia  Skin defect 2018  Visual complaint 2015 Past Surgical History:  
Procedure Laterality Date [address]    HX AORTIC VALVE REPLACEMENT  ,   
 mechanical valve   HX  SECTION    
 HX CORONARY STENT PLACEMENT  May, 2014 STEMI with Stent placement.  HX ENDOSCOPY  2009 EGD [address] CATHETERIZATION    HX PACEMAKER  10/2/2014 Biotronik ICD  HX TUBAL LIGATION    
 IR BX BONE MARROW DIAGNOSTIC  2011 Current Outpatient Medications Medication Sig  
 sacubitril-valsartan (ENTRESTO) 24 mg/26 mg tablet Take 1 Tab by mouth two (2) times a day.  fluticasone-vilanterol (BREO ELLIPTA) 200-25 mcg/dose inhaler Take 1 Puff by inhalation daily.  simvastatin (ZOCOR) 20 mg tablet Take  by mouth nightly.  OTHER Trilogy  mometasone-formoterol (DULERA) 200-5 mcg/actuation HFA inhaler 2 puffs bid, rinse mouth after use.  isosorbide mononitrate ER (IMDUR) 30 mg tablet Take 30 mg by mouth daily.  aspirin delayed-release 81 mg tablet Take 81 mg by mouth.  warfarin (COUMADIN) 2.5 mg tablet Take 2.5 mg by mouth nightly. Needs 5mg on Friday  docusate sodium (COLACE) 50 mg capsule Take 50 mg by mouth daily.  carvedilol (COREG) 6.25 mg tablet Take 1 Tab by mouth two (2) times daily (with meals). (Patient taking differently: Take 3.125 mg by mouth two (2) times daily (with meals). )  furosemide (LASIX) 40 mg tablet Take 1 Tab by mouth daily.  traZODone (DESYREL) 50 mg tablet Take 0.5-1 Tabs by mouth nightly. (Patient taking differently: Take 25-50 mg by mouth as needed.)  traMADol (ULTRAM) 50 mg tablet TAKE 1 TABLET BY MOUTH EVERY 4 HOURS AS NEEDED (Patient taking differently: Take 50 mg by mouth every six (6) hours as needed for Pain.)  escitalopram oxalate (LEXAPRO) 20 mg tablet TAKE 1 TAB BY MOUTH DAILY.  INDICATIONS: ANXIETY WITH DEPRESSION  
  multivit-minerals/folic acid (ADULT ONE DAILY MULTIVITAMIN PO) Take 1 Tab by mouth daily.  ascorbic acid, vitamin C, (VITAMIN C) 500 mg tablet Take 500 mg by mouth daily.  ferrous gluconate 324 mg (38 mg iron) tablet TAKE 1 TAB BY MOUTH DAILY (BEFORE BREAKFAST). INDICATIONS: IRON DEFICIENCY ANEMIA (Patient taking differently: 325 mg two (2) times daily (with meals). TAKE 1 TAB BY MOUTH DAILY (BEFORE BREAKFAST). INDICATIONS: IRON DEFICIENCY ANEMIA Takes twice a day)  fluticasone (FLONASE) 50 mcg/actuation nasal spray 2 Sprays by Both Nostrils route daily as needed.  loratadine (CLARITIN) 10 mg tablet TAKE 1 TAB BY MOUTH DAILY. (Patient taking differently: TAKE 1 TAB BY MOUTH DAILY PRN)  albuterol (PROVENTIL VENTOLIN) 2.5 mg /3 mL (0.083 %) nebulizer solution 3 mL by Nebulization route every four (4) hours as needed for Wheezing.  albuterol (VENTOLIN HFA) 90 mcg/actuation inhaler 2 puffs qid prn (Patient taking differently: Take 2 Puffs by inhalation every six (6) hours as needed for Wheezing or Shortness of Breath.)  cholecalciferol, VITAMIN D3, (VITAMIN D3) 5,000 unit tab tablet Take 1 Tab by mouth daily. (Patient taking differently: Take 2,000 Units by mouth daily.)  acetaminophen (TYLENOL) 325 mg tablet Take 650 mg by mouth every four (4) hours as needed for Pain.  OXYGEN-AIR DELIVERY SYSTEMS 3 L by Nasal route continuous.  nitroglycerin (NITROSTAT) 0.4 mg SL tablet 0.4 mg by SubLINGual route every five (5) minutes as needed for Chest Pain. No current facility-administered medications for this encounter. ALLERGIES: Sulfa (sulfonamide antibiotics) and Aspirin Social History Socioeconomic History  Marital status:  Spouse name: Not on file  Number of children: Not on file  Years of education: Not on file  Highest education level: Not on file Occupational History Employer: RETIRED Comment: [employer] Tobacco Use  
  Smoking status: Former Smoker Packs/day: 0.25 Years: 42.00 Pack years: 10.50 Types: Cigarettes Start date: 10/1/1956 Last attempt to quit: 2014 Years since quittin.6  Smokeless tobacco: Never Used Substance and Sexual Activity  Alcohol use: No  
  Alcohol/week: 0.0 oz  Drug use: No  
Other Topics Concern  Weight Concern Yes  Special Diet Yes Social History Narrative Lives with her daughter. Ambulates only short distances. Social History Tobacco Use Smoking Status Former Smoker  Packs/day: 0.25  
 Years: 42.00  
 Pack years: 10.50  Types: Cigarettes  Start date: 10/1/1956  Last attempt to quit: 2014  Years since quittin.6 Smokeless Tobacco Never Used Family History Problem Relation Age of Onset  Heart Disease Mother CHF  Hypertension Mother  Kidney Disease Mother  Heart Disease Father CHF  Lung Disease Father  Diabetes Father  Cancer Father   
     prostate  Hypertension Father  Heart Attack Father  Heart Disease Maternal Aunt  Diabetes Brother  Coronary Artery Disease Other  Breast Cancer Neg Hx   
 
 
ROS: The patient has no difficulty with chest pain or shortness of breath. No fever or chills. Comprehensive review of systems was otherwise unremarkable except as noted above. Physical Exam:  
Visit Vitals BP (!) 150/91 (BP 1 Location: Left arm) Pulse 80 Temp 98.2 °F (36.8 °C) Resp 20 Ht 5' 2\" (1.575 m) Wt 213 lb 12.8 oz (97 kg) SpO2 90% BMI 39.10 kg/m² Constitutional: Alert, oriented, cooperative patient in no acute distress; appears stated age;  General appearance is within normal limits for wound care patient population. See the today's recorded vitals signs and constitutional data. Eyes: Pupils equal; Sclera are clear. EOMs intact;   The eyes appear to track and move normally. The sclera are not injected. The conjunctive are clear. The eyelids are normal. There is no scleral icterus. ENMT: There are no obvious external ear, nose, lip or mouth lesions. Nares normal; Neck: Overall contour of the neck is normal with no obvious neck masses. Gross hearing is within normal limits. No obvious neck masses CV: RRR;  no JVD; No evidence of cyanosis of the upper extremities. The extremities are perfused without embolic sign, splinter hemorrhages, or petechia. Resp:  Breathing is non-labored; normal rate and effort; no audible wheezing. GI: soft and non-distended without acute abnormality noted. Musculoskeletal: unremarkable with normal function. No embolic signs or cyanosis. Neuro:  Oriented; moves all 4; no focal deficits Psychiatric: Judgement and insight are within normal limits for the wound care population of patients. Patient is oriented to person, place, and time. Recent and remote memory are within normal limits. Mood and affect are within normal limits. Integumentary (Skin/Wounds) See inspection of wound(s) below. There are no other skin areas of palpable concern. See wound center documentation including photos in the [address] Wound Template made part of this record by reference. Wounds: 
Wound Knee Anterior; Left (Active) DRESSING STATUS Clean, dry, and intact 12/6/2018  3:57 PM  
DRESSING TYPE Negative pressure wound therapy 12/6/2018  3:57 PM  
Non-Pressure Injury Full thickness (subcut/muscle) 12/6/2018  3:57 PM  
Wound Length (cm) 4.2 cm 12/6/2018  3:57 PM  
Wound Width (cm) 7.3 cm 12/6/2018  3:57 PM  
Wound Depth (cm) 0.1 12/6/2018  3:57 PM  
Wound Surface area (cm^2) 30.66 cm^2 12/6/2018  3:57 PM  
Change in Wound Size % 45.5 12/6/2018  3:57 PM  
Condition of Base Granulation 12/6/2018  3:57 PM  
Condition of Edges Open 11/1/2018  1:43 PM  
Tissue Type Red 11/1/2018  1:43 PM  
 Tissue Type Percent Red 100 11/1/2018  1:43 PM  
Drainage Amount  Moderate 12/6/2018  3:57 PM  
Drainage Color Sanguinous 12/6/2018  3:57 PM  
Wound Odor None 12/6/2018  3:57 PM  
Periwound Skin Condition Intact 11/8/2018  2:21 PM  
Cleansing and Cleansing Agents  Normal saline 12/6/2018  3:57 PM  
Procedure Tolerated Well 12/6/2018  3:57 PM  
Number of days: 49 Wound Leg Lower Anterior; Inferior;Left;Lower (Active) Number of days:   
   
[REMOVED] Wound Knee Left (Removed) Number of days: 29  
   
[REMOVED] Wound (Removed) Number of days: 6 Recent vitals (if inpt): 
Patient Vitals for the past 24 hrs: 
 BP Temp Pulse Resp SpO2 Height Weight 12/06/18 1551 (!) 150/91 98.2 °F (36.8 °C) 80 20 90 % 5' 2\" (1.575 m) 213 lb 12.8 oz (97 kg) Labs: 
Recent Labs 12/04/18 
1330 HGB 8.4* Lab Results Component Value Date/Time WBC 6.7 11/19/2018 06:28 AM  
 HGB 8.4 (L) 12/04/2018 01:30 PM  
 PLATELET 002 41/88/6391 06:28 AM  
 Sodium 137 11/20/2018 05:48 AM  
 Potassium 4.1 11/20/2018 05:48 AM  
 Chloride 99 11/20/2018 05:48 AM  
 CO2 32 11/20/2018 05:48 AM  
 BUN 72 (H) 11/20/2018 05:48 AM  
 Creatinine 1.80 (H) 11/20/2018 05:48 AM  
 Glucose 126 (H) 11/20/2018 05:48 AM  
 INR 2.7 11/20/2018 05:48 AM  
 INR, External 2.3 12/03/2018  
 aPTT 60.2 (H) 10/10/2018 04:02 AM  
 Bilirubin, total 0.6 11/15/2018 02:36 AM  
 AST (SGOT) 18 11/15/2018 02:36 AM  
 ALT (SGPT) 24 11/15/2018 02:36 AM  
 Alk. phosphatase 60 11/15/2018 02:36 AM  
 Amylase 88 01/31/2012 03:12 PM  
 Lipase 157 10/08/2018 04:26 AM  
 Lactic acid 0.7 02/16/2016 06:16 AM  
 Troponin-I <0.05 01/31/2012 03:32 PM  
 Troponin-I, Qt. 0.08 (H) 12/01/2016 02:20 AM  
 
 
I reviewed recent labs and recent radiologic studies. I independently reviewed radiology images for studies I described above or studies I have ordered. Admission date (for inpatients): 12/6/2018 * No surgery found *  * No surgery found * ASSESSMENT/PLAN: 
Significant improvement since discharge from the hospital status post debridement on 10/7/2018 and VAC placement. She had PuraplyAM skin substitute graft on 11/1/2018. The VAC was maintained for 1 week without change. The wound is large and full-thickness. She was scheduled for 1 week follow-up after evaluation on 11/8. She became hospitalized and missed her follow-up appointments. She was hospitalized with respiratory issues and is on home oxygen as well as her chronic anticoagulation. She has developed some bloody drainage from the [facility]. The wound has improved dramatically with 100% granulation base. PuraplyAM #2 was placed today and well tolerated. We will discontinue the [facility] and request a another graft for next visit. We will likely progress to autograft soon. Follow-up in 1 week. Problem List  Date Reviewed: 11/9/2018 Codes Class Noted Acute exacerbation of CHF (congestive heart failure) (HCC) ICD-10-CM: I50.9 ICD-9-CM: 428.0  11/14/2018 Skin defect ICD-10-CM: L98.9 ICD-9-CM: 709.8  11/1/2018 Infection of left knee ([address] ICD-10-CM: M00.9 ICD-9-CM: 686.9  10/17/2018 Open wound of left lower extremity with complication BOR-94-OL: Q07.686O ICD-9-CM: 891.1  10/7/2018 Abscess ICD-10-CM: L02.91 
ICD-9-CM: 682.9  10/6/2018 History of incision and drainage ICD-10-CM: Z98.890 ICD-9-CM: V45.89  10/6/2018 Traumatic hematoma of left knee (Chronic) ICD-10-CM: L86.96AD ICD-9-CM: 924.11  9/8/2018 Debility ICD-10-CM: R53.81 ICD-9-CM: 799.3  9/8/2018 Anemia (Chronic) ICD-10-CM: D64.9 ICD-9-CM: 285.9  9/7/2018 Chronic respiratory failure with hypoxia [facility]) ICD-10-CM: J96.11 
ICD-9-CM: 518.83, 799.02  9/7/2018 Encounter for immunization ICD-10-CM: Z08 ICD-9-CM: V03.89  8/21/2018 Obesity, morbid [facility] (Chronic) ICD-10-CM: E66.01 
ICD-9-CM: 278.01  6/8/2018 Physical debility (Chronic) ICD-10-CM: R53.81 ICD-9-CM: 799.3  5/2/2018 Closed nondisplaced fracture of shaft of fifth metacarpal bone of left hand ICD-10-CM: Q81.451W ICD-9-CM: 815.03  4/28/2018 Long term (current) use of anticoagulants (Chronic) ICD-10-CM: Z79.01 
ICD-9-CM: V58.61  4/19/2018 Dysthymia ICD-10-CM: F34.1 ICD-9-CM: 300.4  4/5/2018 Type 2 diabetes mellitus with nephropathy (HCC) (Chronic) ICD-10-CM: E11.21 
ICD-9-CM: 250.40, 583.81  12/18/2017 S/P AVR (aortic valve replacement) (Chronic) ICD-10-CM: Z95.2 ICD-9-CM: V43.3  Unknown Overview Signed 2/23/2016 11:04 AM by Sarah Beth Delgado Mechanical/Artific Cardiomyopathy ([facility] (Chronic) ICD-10-CM: I42.9 ICD-9-CM: 425.4  Unknown Osteopenia ICD-10-CM: M85.80 ICD-9-CM: 733.90  Unknown HLD (hyperlipidemia) (Chronic) ICD-10-CM: C82.5 ICD-9-CM: 272.4  Unknown Osteoarthritis ICD-10-CM: M19.90 ICD-9-CM: 715.90  Unknown  
   
 ICD (implantable cardioverter-defibrillator) in place ICD-10-CM: Z95.810 ICD-9-CM: V45.02  10/2/2014 Overview Signed 10/2/2014  4:58 PM by Lissa Ritchieroniblaise single-chamber ICD implantation 10/20/14 Diabetes mellitus type 2, diet-controlled (HCC) (Chronic) ICD-10-CM: E11.9 ICD-9-CM: 250.00  8/28/2014 Overview Signed 10/6/2018  8:56 PM by July Gusman NP  
  A1C: 5.7 COPD (chronic obstructive pulmonary disease) (HCC) (Chronic) ICD-10-CM: J44.9 ICD-9-CM: 812  4/2/2014 Overview Signed 10/6/2018  8:56 PM by July Gusman NP  
  HOME OXYGEN 3 LITERS 
  
  
   
 REJI (obstructive sleep apnea)-cpap (Chronic) ICD-10-CM: G47.33 
ICD-9-CM: 327.23  4/2/2014 CKD (chronic kidney disease) stage 3, GFR 30-59 ml/min (HCC) (Chronic) ICD-10-CM: N18.3 ICD-9-CM: 585.3  7/10/2013 Anticoagulated on Coumadin (Chronic) ICD-10-CM: Z51.81, Z79.01 
ICD-9-CM: V58.83, V58.61  7/9/2013 Overview Signed 7/9/2013  4:16 PM by Liliana Shepherd NP  
  S/P AVR 
  
  
   
 CAD (coronary artery disease) (Chronic) ICD-10-CM: I25.10 ICD-9-CM: 414.00  1/20/2013 Overview Signed 5/20/2014 10:05 AM by Lois Bryant NP  
  5/8/14 PCI LAD with stent placed Chronic combined systolic and diastolic heart failure (HCC) (Chronic) ICD-10-CM: I50.42 
ICD-9-CM: 428.42  1/20/2013 Overview Addendum 4/28/2018  4:53 AM by Sonido Mckinnon MD  
  5/8/14 ECHO:  EF 10-15% 12/2017:  EF 25-30% Essential hypertension, benign (Chronic) ICD-10-CM: I10 
ICD-9-CM: 401.1  1/20/2013 MDS (myelodysplastic syndrome) (HCC) (Chronic) ICD-10-CM: D46.9 ICD-9-CM: 238.75  12/17/2011 Overview Addendum 8/29/2013 11:06 AM by Yelena Everett Procrit started in August, 2011 12/18/11 Procrit weekly and Iron stores. 5-12 12-13-12 good response to 3 weekly procrit did not need it last time 3-7-13 Pt doing well. Just wanted a \"check-up. \" Responding to Procrit every three weeks. 8-29-13 patient has missed some injections on recently restarted hemoglobin only issue , takes oral iron iron stores each time Iron deficiency anemia due to chronic blood loss (Chronic) ICD-10-CM: D50.0 ICD-9-CM: 280.0  7/29/2009 Active Problems: * No active hospital problems. * Any procedures done today in the wound center are documented in a separate note in connect care made part of this record by reference Wound Care Orders Placed This Encounter  WOUND CARE, DRESSING CHANGE Cleanse wound with normal saline. Apply Puraply, Mepilex transfer, Exudry, and wrap with Coban 2 to just above the knee. Hold wound VAC for 1 week until next visit. Hold home health for 1 week due to graft placement. Leave dressing in place until next visit. Return to wound center in 1 week. Plan to reapply Puraply at next visit. Standing Status:   Standing Number of Occurrences:   1 Follow-up Information Follow up With Specialties Details Why Contact Info [facility] WOUND CARE Wound Care In 1 week For wound re-check [phone] [id number] [phone] Signed:  Cathi Keller MD,  FACS

## 2018-12-06 NOTE — WOUND CARE
[address] [place] [address], [facility] [address] Phone: [phone] Fax: [phone] Patient: Maxwell Good MRN: [id number]  SSN: [id number] YOB: 1950  Age: 77 y.o. Sex: female Return Appointment: 1 week with Ananth Wilhelm MD 
 
Instructions: Cleanse wound with normal saline. Apply Puraply, Mepilex transfer, Exudry, and wrap with Coban 2 to just above the knee. Hold wound VAC for 1 week until next visit. Hold home health for 1 week due to graft placement. Leave dressing in place until next visit. Return to wound center in 1 week. Plan to reapply Puraply at next visit. Should you experience increased redness, swelling, pain, foul odor, size of wound(s), or have a temperature over 101 degrees please contact the [address] [place] [address] Road at [phone] or if after hours contact your primary care physician or go to the hospital emergency department. Signed By: Erik Collins, PT, [address] [facility],[address] December 6, 2018

## 2018-12-07 ENCOUNTER — PATIENT OUTREACH (OUTPATIENT)
Dept: RESPIRATORY THERAPY | Age: 68
End: 2018-12-07

## 2018-12-07 ENCOUNTER — HOME CARE VISIT (OUTPATIENT)
Dept: SCHEDULING | Facility: HOME HEALTH | Age: 68
End: 2018-12-07
Payer: MEDICARE

## 2018-12-07 VITALS
SYSTOLIC BLOOD PRESSURE: 122 MMHG | OXYGEN SATURATION: 97 % | DIASTOLIC BLOOD PRESSURE: 64 MMHG | TEMPERATURE: 97.9 F | OXYGEN SATURATION: 97 % | TEMPERATURE: 98 F | HEART RATE: 68 BPM | RESPIRATION RATE: 17 BRPM | DIASTOLIC BLOOD PRESSURE: 68 MMHG | HEART RATE: 66 BPM | RESPIRATION RATE: 18 BRPM | OXYGEN SATURATION: 96 % | SYSTOLIC BLOOD PRESSURE: 128 MMHG | DIASTOLIC BLOOD PRESSURE: 66 MMHG | RESPIRATION RATE: 18 BRPM | SYSTOLIC BLOOD PRESSURE: 130 MMHG | HEART RATE: 67 BPM | TEMPERATURE: 97.9 F

## 2018-12-07 VITALS
OXYGEN SATURATION: 97 % | TEMPERATURE: 98.3 F | SYSTOLIC BLOOD PRESSURE: 138 MMHG | RESPIRATION RATE: 14 BRPM | HEART RATE: 70 BPM | BODY MASS INDEX: 39.14 KG/M2 | WEIGHT: 214 LBS | DIASTOLIC BLOOD PRESSURE: 62 MMHG

## 2018-12-07 PROCEDURE — 3331090001 HH PPS REVENUE CREDIT

## 2018-12-07 PROCEDURE — 3331090002 HH PPS REVENUE DEBIT

## 2018-12-07 NOTE — TELEPHONE ENCOUNTER
Mrs. Addy Taylor said she was doing very well. She went to the [facility] and the doctor removed the wound Vac and said her wound was healing really good. No medication changes or issues. She said she was prepared for bad weather if it comes. I verified she had my number and asked her to call with any medication questions.

## 2018-12-08 PROCEDURE — 3331090002 HH PPS REVENUE DEBIT

## 2018-12-08 PROCEDURE — 3331090001 HH PPS REVENUE CREDIT

## 2018-12-09 PROCEDURE — 3331090001 HH PPS REVENUE CREDIT

## 2018-12-09 PROCEDURE — 3331090002 HH PPS REVENUE DEBIT

## 2018-12-10 ENCOUNTER — HOME CARE VISIT (OUTPATIENT)
Dept: SCHEDULING | Facility: HOME HEALTH | Age: 68
End: 2018-12-10
Payer: MEDICARE

## 2018-12-10 PROCEDURE — 3331090002 HH PPS REVENUE DEBIT

## 2018-12-10 PROCEDURE — 3331090001 HH PPS REVENUE CREDIT

## 2018-12-10 NOTE — PROGRESS NOTES
Home Visit #2 Health  arrived at residence to find patient resting comfortably, alert and oriented in no distress. Patients vital signs as noted. Patient states that she feels well and is without complaint. Will see in home next week. Lung Sounds: Clear and equal bilaterally Weight: 214 lb. Decrease of 1 lb from previous day. Edema:  Pedal edema minimal with abdomen soft and non-tender. B/P 138/82 LA Sitting Follow Up Appointments: 
12/13  [address] Transportation: Transport Service Medications: All medicines on hand and patient is compliant Education: Reviewed CHF action plan to include low sodium diet, fluid restrictions and early interventions. DME: Trilogy NIV and continuous oxygen are both in good working order. Safety Concerns: None Patient/Family Concerns: None Tasks:None Physical Assessment completed and noted on CHF assessment Form. Help line discussed. Will follow up with pt in one week via home visit. End of Visit. Svetlana Merida, Paramedic Health

## 2018-12-11 ENCOUNTER — PATIENT OUTREACH (OUTPATIENT)
Dept: RESPIRATORY THERAPY | Age: 68
End: 2018-12-11

## 2018-12-11 ENCOUNTER — HOME CARE VISIT (OUTPATIENT)
Dept: SCHEDULING | Facility: HOME HEALTH | Age: 68
End: 2018-12-11
Payer: MEDICARE

## 2018-12-11 VITALS
OXYGEN SATURATION: 96 % | HEART RATE: 76 BPM | TEMPERATURE: 97.8 F | RESPIRATION RATE: 17 BRPM | DIASTOLIC BLOOD PRESSURE: 72 MMHG | SYSTOLIC BLOOD PRESSURE: 133 MMHG

## 2018-12-11 VITALS
HEART RATE: 80 BPM | RESPIRATION RATE: 16 BRPM | OXYGEN SATURATION: 91 % | WEIGHT: 212.8 LBS | BODY MASS INDEX: 38.92 KG/M2 | TEMPERATURE: 97.8 F

## 2018-12-11 LAB
INR BLD: 2.9 (ref 0.9–1.1)
PT POC: 34.9 SECONDS (ref 11.8–14.9)

## 2018-12-11 PROCEDURE — 3331090001 HH PPS REVENUE CREDIT

## 2018-12-11 PROCEDURE — G0299 HHS/HOSPICE OF RN EA 15 MIN: HCPCS

## 2018-12-11 PROCEDURE — 3331090002 HH PPS REVENUE DEBIT

## 2018-12-11 NOTE — PROGRESS NOTES
Home Visit # 3 Health  arrived at residence to find patient eating a lunch of deli meat sandwich. Patient has no respiratory complaint and denies any shortness of breath or cough. Patient does complain that her left lower leg has been bleeding. She complains of odor from extremity. The odor is noticeable within close proximity to the patient. The extremity is wrapped and covered with dressing from The [address] [place] [address] Road where she receives treatment. HHRN to visit today to check wound and patient to see wound center on 12/13. Lung Sounds: equal and clear bilaterally Weight: 212.8 which is decrease of 1.2 lbs from previous day. Edema: +1 pitting to right lower extremity left is wrapped. Abdomen is soft and non-tender. B/P 146/84 Left arm sitting Follow Up Appointments: 
12/13  [address] [place] [address] Transportation: Caregiver Medications: All on hand and the patient remains compliant Education:Discussed low sodium diet. Discussed the high sodium levels of processed lunch meats. Discussed options. Reviewed CHF action plan and early interventions. DME: Trilogy NIV and 02 concentrator are both in good working condition. Safety Concerns: Poor diet Patient/Family Concerns: Wound odor Tasks: None Physical Assessment completed and noted on CHF assessment Form. Help line discussed. Will follow up with pt in one week via home visit. End of Visit. Ricardo Economy, Paramedic Health

## 2018-12-12 PROCEDURE — 3331090001 HH PPS REVENUE CREDIT

## 2018-12-12 PROCEDURE — 3331090002 HH PPS REVENUE DEBIT

## 2018-12-13 ENCOUNTER — TELEPHONE (OUTPATIENT)
Dept: HOME HEALTH SERVICES | Facility: HOME HEALTH | Age: 68
End: 2018-12-13

## 2018-12-13 ENCOUNTER — HOSPITAL ENCOUNTER (OUTPATIENT)
Dept: WOUND CARE | Age: 68
Discharge: HOME | End: 2018-12-13
Attending: SURGERY
Payer: MEDICARE

## 2018-12-13 VITALS
SYSTOLIC BLOOD PRESSURE: 127 MMHG | DIASTOLIC BLOOD PRESSURE: 67 MMHG | HEART RATE: 73 BPM | OXYGEN SATURATION: 94 % | TEMPERATURE: 98.4 F | BODY MASS INDEX: 39.34 KG/M2 | WEIGHT: 213.8 LBS | HEIGHT: 62 IN | RESPIRATION RATE: 20 BRPM

## 2018-12-13 DIAGNOSIS — S80.02XS TRAUMATIC HEMATOMA OF LEFT KNEE, SEQUELA: Chronic | ICD-10-CM

## 2018-12-13 DIAGNOSIS — L98.9 SKIN DEFECT: ICD-10-CM

## 2018-12-13 DIAGNOSIS — E11.9 DIABETES MELLITUS TYPE 2, DIET-CONTROLLED (HCC): Chronic | ICD-10-CM

## 2018-12-13 DIAGNOSIS — R53.81 DEBILITY: ICD-10-CM

## 2018-12-13 DIAGNOSIS — Z79.01 ANTICOAGULATED ON COUMADIN: Chronic | ICD-10-CM

## 2018-12-13 PROCEDURE — 99214 OFFICE O/P EST MOD 30 MIN: CPT | Performed by: SURGERY

## 2018-12-13 PROCEDURE — 3331090001 HH PPS REVENUE CREDIT

## 2018-12-13 PROCEDURE — 3331090002 HH PPS REVENUE DEBIT

## 2018-12-13 PROCEDURE — 29581 APPL MULTLAYER CMPRN SYS LEG: CPT

## 2018-12-13 NOTE — PROGRESS NOTES
12/13/18 1645   Wound Knee Anterior; Left   Date First Assessed/Time First Assessed: 10/18/18 1505   POA: Yes  Wound Type: Trauma  Location: Knee  Wound Description (Optional): #1  Orientation: Anterior; Left   DRESSING STATUS Saturated   DRESSING TYPE (Puraply, ABD, Coban 2)   Non-Pressure Injury Full thickness (subcut/muscle)   Wound Length (cm) 5 cm   Wound Width (cm) 8 cm   Wound Depth (cm) 0.1   Wound Surface area (cm^2) 40 cm^2   Change in Wound Size % 28.9   Condition of Base Granulation   Drainage Amount  Large   Drainage Color Sanguinous   Wound Odor Foul   Cleansing and Cleansing Agents  Normal saline   Dressing Type Applied (Hydrofera ready, ABD, Coban 2)   Procedure Tolerated Well

## 2018-12-13 NOTE — WOUND CARE
[address]  [address], [address]  Phone: [phone]  Fax: [phone]    Patient: Socorro Dangelo MRN: [id number]  SSN: [id number]    YOB: 1950  Age: 77 y.o. Sex: female       Return Appointment: 1 week with Nilo Martinez MD    Instructions: Left anterior knee wound:  Cleanse wound with normal saline. Apply Hydrofera Ready, ABD, and wrap with Coban 2. Home health to change dressing 3 times per week. May discontinue wound VAC at this time and return unit to [facility]. Return to wound center in 1 week. Should you experience increased redness, swelling, pain, foul odor, size of wound(s), or have a temperature over 101 degrees please contact the [address] [place] [address] Road at [phone] or if after hours contact your primary care physician or go to the hospital emergency department.     Signed By: Payton Go, PT, [address] [facility],[address]     December 13, 2018

## 2018-12-14 PROCEDURE — 3331090001 HH PPS REVENUE CREDIT

## 2018-12-14 PROCEDURE — 3331090002 HH PPS REVENUE DEBIT

## 2018-12-15 PROCEDURE — 3331090002 HH PPS REVENUE DEBIT

## 2018-12-15 PROCEDURE — 3331090001 HH PPS REVENUE CREDIT

## 2018-12-16 ENCOUNTER — HOME CARE VISIT (OUTPATIENT)
Dept: SCHEDULING | Facility: HOME HEALTH | Age: 68
End: 2018-12-16
Payer: MEDICARE

## 2018-12-16 PROCEDURE — 3331090002 HH PPS REVENUE DEBIT

## 2018-12-16 PROCEDURE — 3331090001 HH PPS REVENUE CREDIT

## 2018-12-17 ENCOUNTER — HOME CARE VISIT (OUTPATIENT)
Dept: SCHEDULING | Facility: HOME HEALTH | Age: 68
End: 2018-12-17
Payer: MEDICARE

## 2018-12-17 PROCEDURE — 3331090002 HH PPS REVENUE DEBIT

## 2018-12-17 PROCEDURE — 3331090001 HH PPS REVENUE CREDIT

## 2018-12-17 PROCEDURE — G0299 HHS/HOSPICE OF RN EA 15 MIN: HCPCS

## 2018-12-18 ENCOUNTER — HOME CARE VISIT (OUTPATIENT)
Dept: SCHEDULING | Facility: HOME HEALTH | Age: 68
End: 2018-12-18
Payer: MEDICARE

## 2018-12-18 ENCOUNTER — PATIENT OUTREACH (OUTPATIENT)
Dept: RESPIRATORY THERAPY | Age: 68
End: 2018-12-18

## 2018-12-18 VITALS
OXYGEN SATURATION: 94 % | RESPIRATION RATE: 22 BRPM | DIASTOLIC BLOOD PRESSURE: 68 MMHG | TEMPERATURE: 98.3 F | SYSTOLIC BLOOD PRESSURE: 125 MMHG | HEART RATE: 75 BPM

## 2018-12-18 PROBLEM — Z98.890 HISTORY OF INCISION AND DRAINAGE: Status: RESOLVED | Noted: 2018-10-06 | Resolved: 2018-12-18

## 2018-12-18 PROBLEM — E66.01 OBESITY, MORBID (HCC): Chronic | Status: RESOLVED | Noted: 2018-06-08 | Resolved: 2018-12-18

## 2018-12-18 PROBLEM — L98.9 SKIN DEFECT: Status: RESOLVED | Noted: 2018-11-01 | Resolved: 2018-12-18

## 2018-12-18 PROBLEM — S80.02XA TRAUMATIC HEMATOMA OF LEFT KNEE: Chronic | Status: RESOLVED | Noted: 2018-09-08 | Resolved: 2018-12-18

## 2018-12-18 PROBLEM — Z79.01 LONG TERM (CURRENT) USE OF ANTICOAGULANTS: Chronic | Status: RESOLVED | Noted: 2018-04-19 | Resolved: 2018-12-18

## 2018-12-18 LAB
INR BLD: 2.1 (ref 0.9–1.1)
PT POC: 25.1 SECONDS (ref 11.8–14.9)

## 2018-12-18 PROCEDURE — 3331090002 HH PPS REVENUE DEBIT

## 2018-12-18 PROCEDURE — A6449 LT COMPRES BAND >=3" <5"/YD: HCPCS

## 2018-12-18 PROCEDURE — 3331090001 HH PPS REVENUE CREDIT

## 2018-12-18 PROCEDURE — A6253 ABSORPT DRG > 48 SQ IN W/O B: HCPCS

## 2018-12-18 PROCEDURE — A6216 NON-STERILE GAUZE<=16 SQ IN: HCPCS

## 2018-12-18 PROCEDURE — A6446 CONFORM BAND S W>=3" <5"/YD: HCPCS

## 2018-12-18 PROCEDURE — G0299 HHS/HOSPICE OF RN EA 15 MIN: HCPCS

## 2018-12-18 PROCEDURE — A6209 FOAM DRSG <=16 SQ IN W/O BDR: HCPCS

## 2018-12-18 NOTE — PROGRESS NOTES
Home Visit #4  Health  arrived at residence to find patient alert and oriented in no distress resting comfortably. Patient has no complaint. Patient has left lower extremity elevated and it is wrapped and bandaged. Patient was seen by wound care earlier this week and care is on going. Will see this patient by phone next week due to [address]. Lung Sounds: Equal and clear bilaterally    Weight: 215 lb No change from previous day. Edema:  No notable edema to lower extremities and patients abdomen is soft and non-tender. B/P124/68  LA Sitting    Follow Up Appointments:  12/20  [facility]  12/26  [facility] for Procrit injection    Transportation: Family    Medications: All meds are on hand and the patient reports compliance. Education: Stressed low sodium diet and fluid restrictions and reviewed CHF action plan. DME: Trilogy and 02 concentrator are operating normally and the patient is compliant with their use. Safety Concerns: None    Patient/Family Concerns: None    Tasks:None    Physical Assessment completed and noted on CHF assessment Form. Help line discussed. Will follow up with pt in one week via home visit. End of Visit.  Raine Greene, Paramedic Health

## 2018-12-19 ENCOUNTER — HOME CARE VISIT (OUTPATIENT)
Dept: SCHEDULING | Facility: HOME HEALTH | Age: 68
End: 2018-12-19
Payer: MEDICARE

## 2018-12-19 VITALS
SYSTOLIC BLOOD PRESSURE: 132 MMHG | DIASTOLIC BLOOD PRESSURE: 70 MMHG | RESPIRATION RATE: 17 BRPM | TEMPERATURE: 98 F | HEART RATE: 78 BPM | OXYGEN SATURATION: 96 %

## 2018-12-19 VITALS
HEART RATE: 88 BPM | TEMPERATURE: 98 F | SYSTOLIC BLOOD PRESSURE: 122 MMHG | RESPIRATION RATE: 17 BRPM | DIASTOLIC BLOOD PRESSURE: 78 MMHG | OXYGEN SATURATION: 96 %

## 2018-12-19 VITALS
DIASTOLIC BLOOD PRESSURE: 68 MMHG | WEIGHT: 215 LBS | SYSTOLIC BLOOD PRESSURE: 124 MMHG | HEIGHT: 62 IN | BODY MASS INDEX: 39.56 KG/M2 | TEMPERATURE: 98.4 F | RESPIRATION RATE: 18 BRPM | HEART RATE: 66 BPM

## 2018-12-19 PROCEDURE — G0299 HHS/HOSPICE OF RN EA 15 MIN: HCPCS

## 2018-12-19 PROCEDURE — 3331090001 HH PPS REVENUE CREDIT

## 2018-12-19 PROCEDURE — 3331090002 HH PPS REVENUE DEBIT

## 2018-12-20 ENCOUNTER — TELEPHONE (OUTPATIENT)
Dept: HOME HEALTH SERVICES | Facility: HOME HEALTH | Age: 68
End: 2018-12-20

## 2018-12-20 PROCEDURE — 3331090002 HH PPS REVENUE DEBIT

## 2018-12-20 PROCEDURE — A6454 SELF-ADHER BAND W>=3" <5"/YD: HCPCS

## 2018-12-20 PROCEDURE — 3331090001 HH PPS REVENUE CREDIT

## 2018-12-21 ENCOUNTER — HOME CARE VISIT (OUTPATIENT)
Dept: SCHEDULING | Facility: HOME HEALTH | Age: 68
End: 2018-12-21
Payer: MEDICARE

## 2018-12-21 PROCEDURE — 3331090002 HH PPS REVENUE DEBIT

## 2018-12-21 PROCEDURE — 3331090001 HH PPS REVENUE CREDIT

## 2018-12-21 PROCEDURE — G0299 HHS/HOSPICE OF RN EA 15 MIN: HCPCS

## 2018-12-22 PROCEDURE — 3331090002 HH PPS REVENUE DEBIT

## 2018-12-22 PROCEDURE — 3331090001 HH PPS REVENUE CREDIT

## 2018-12-23 PROCEDURE — 3331090002 HH PPS REVENUE DEBIT

## 2018-12-23 PROCEDURE — 3331090001 HH PPS REVENUE CREDIT

## 2018-12-24 ENCOUNTER — HOME CARE VISIT (OUTPATIENT)
Dept: SCHEDULING | Facility: HOME HEALTH | Age: 68
End: 2018-12-24
Payer: MEDICARE

## 2018-12-24 VITALS
DIASTOLIC BLOOD PRESSURE: 72 MMHG | RESPIRATION RATE: 17 BRPM | HEART RATE: 76 BPM | SYSTOLIC BLOOD PRESSURE: 122 MMHG | TEMPERATURE: 97.8 F | OXYGEN SATURATION: 95 %

## 2018-12-24 VITALS
OXYGEN SATURATION: 97 % | RESPIRATION RATE: 18 BRPM | TEMPERATURE: 97.9 F | DIASTOLIC BLOOD PRESSURE: 70 MMHG | SYSTOLIC BLOOD PRESSURE: 123 MMHG

## 2018-12-24 PROCEDURE — G0299 HHS/HOSPICE OF RN EA 15 MIN: HCPCS

## 2018-12-24 PROCEDURE — 3331090001 HH PPS REVENUE CREDIT

## 2018-12-24 PROCEDURE — 3331090002 HH PPS REVENUE DEBIT

## 2018-12-25 PROCEDURE — 3331090001 HH PPS REVENUE CREDIT

## 2018-12-25 PROCEDURE — 3331090002 HH PPS REVENUE DEBIT

## 2018-12-26 ENCOUNTER — HOSPITAL ENCOUNTER (OUTPATIENT)
Dept: INFUSION THERAPY | Age: 68
Discharge: HOME | End: 2018-12-26
Payer: MEDICARE

## 2018-12-26 ENCOUNTER — HOME CARE VISIT (OUTPATIENT)
Dept: SCHEDULING | Facility: HOME HEALTH | Age: 68
End: 2018-12-26
Payer: MEDICARE

## 2018-12-26 VITALS
RESPIRATION RATE: 18 BRPM | TEMPERATURE: 97.9 F | OXYGEN SATURATION: 94 % | HEART RATE: 82 BPM | DIASTOLIC BLOOD PRESSURE: 56 MMHG | SYSTOLIC BLOOD PRESSURE: 114 MMHG

## 2018-12-26 DIAGNOSIS — D50.0 IRON DEFICIENCY ANEMIA DUE TO CHRONIC BLOOD LOSS: ICD-10-CM

## 2018-12-26 DIAGNOSIS — D46.9 MDS (MYELODYSPLASTIC SYNDROME) (HCC): Primary | ICD-10-CM

## 2018-12-26 LAB — HGB BLD-MCNC: 8.2 G/DL (ref 11.7–15.4)

## 2018-12-26 PROCEDURE — G0299 HHS/HOSPICE OF RN EA 15 MIN: HCPCS

## 2018-12-26 PROCEDURE — 3331090002 HH PPS REVENUE DEBIT

## 2018-12-26 PROCEDURE — 36415 COLL VENOUS BLD VENIPUNCTURE: CPT

## 2018-12-26 PROCEDURE — 74011250636 HC RX REV CODE- 250/636: Performed by: NURSE PRACTITIONER

## 2018-12-26 PROCEDURE — 85018 HEMOGLOBIN: CPT

## 2018-12-26 PROCEDURE — 96372 THER/PROPH/DIAG INJ SC/IM: CPT

## 2018-12-26 PROCEDURE — 3331090001 HH PPS REVENUE CREDIT

## 2018-12-26 RX ADMIN — ERYTHROPOIETIN 60000 UNITS: 40000 INJECTION, SOLUTION INTRAVENOUS; SUBCUTANEOUS at 14:08

## 2018-12-27 ENCOUNTER — HOSPITAL ENCOUNTER (OUTPATIENT)
Dept: WOUND CARE | Age: 68
Discharge: HOME | End: 2018-12-27
Attending: SURGERY
Payer: MEDICARE

## 2018-12-27 VITALS
BODY MASS INDEX: 39.32 KG/M2 | TEMPERATURE: 98.2 F | RESPIRATION RATE: 20 BRPM | SYSTOLIC BLOOD PRESSURE: 110 MMHG | HEART RATE: 68 BPM | HEIGHT: 62 IN | OXYGEN SATURATION: 96 % | DIASTOLIC BLOOD PRESSURE: 64 MMHG

## 2018-12-27 VITALS
HEART RATE: 77 BPM | SYSTOLIC BLOOD PRESSURE: 142 MMHG | OXYGEN SATURATION: 98 % | RESPIRATION RATE: 17 BRPM | TEMPERATURE: 97.1 F | DIASTOLIC BLOOD PRESSURE: 74 MMHG

## 2018-12-27 DIAGNOSIS — L97.922 ULCER OF LEFT LOWER EXTREMITY WITH FAT LAYER EXPOSED (HCC): ICD-10-CM

## 2018-12-27 DIAGNOSIS — L98.9 SKIN DEFECT: ICD-10-CM

## 2018-12-27 DIAGNOSIS — R53.81 DEBILITY: ICD-10-CM

## 2018-12-27 DIAGNOSIS — Z79.01 ANTICOAGULATED ON COUMADIN: Chronic | ICD-10-CM

## 2018-12-27 PROCEDURE — 3331090001 HH PPS REVENUE CREDIT

## 2018-12-27 PROCEDURE — 3331090002 HH PPS REVENUE DEBIT

## 2018-12-27 PROCEDURE — 99214 OFFICE O/P EST MOD 30 MIN: CPT | Performed by: SURGERY

## 2018-12-27 PROCEDURE — 29581 APPL MULTLAYER CMPRN SYS LEG: CPT

## 2018-12-27 NOTE — WOUND CARE
[address]  [address], [address]  Phone: [phone]  Fax: [phone]    Patient: Reuben Josue MRN: [id number]  SSN: [id number]    YOB: 1950  Age: 77 y.o. Sex: female       Return Appointment: 1 week with Nannette Robertson MD    Instructions: Left anterior knee wound:  Cleanse wound with normal saline. Apply Hydrofera Ready, ABD, and wrap with Coban 2. Home health to change dressing 3 times per week. Should you experience increased redness, swelling, pain, foul odor, size of wound(s), or have a temperature over 101 degrees please contact the [address] [place] [address] Road at [phone] or if after hours contact your primary care physician or go to the hospital emergency department.     Signed By: Blayne Coates RN     December 27, 2018

## 2018-12-27 NOTE — PROGRESS NOTES
WOUND CENTER Consult Note/Progress Note:   Zev Talavera  MRN: [id number]  NMM:9/4/3225  Age:68 y.o.    HPI: Zev Talavera is a 77 y.o. female who is a 77 y.o. female \"with extensive medical history as noted below on chronic coumadin who initially presented to ER 9/7 after fall in bathroom 8/28 where she landed directly on her left knee with complaints of dizziness and fatigue and found to have Hgb of 6.7.  She was transfused to Hgb of 9.6.  She also was found with a large hematoma/bullae over her left knee and INR of 4.3.  She underwent an I+D per Dr Allen Love on 9/11 with a large amount of serosanguinous fluid evacuated. Maria Elena Jones has been followed by home health PT, OT and wound care since discharge from rehab on ninth floor 9/11-9/21. Maria Elena Jones has done well except the wound has not closed.  Two days ago, [facility] RN and daughter who is an RN noted mild erythema and heat surrounding upper half of wound.  Open area approx 5-6 cm in circumference.  No purulent drainage, oozing small amounts of dark blood with slightly foul odor.  Full ROM to knee.  All distal circulation, motion and sensation WNL for patient.  No distal edema, mild local edema.  Multiple old scars from prior lower extremity wounds.  Patient denies pain to area, fever, systemic symptoms.  She does not appear septic and lactic acid is 1.8.  WBC is 11.6 without shift.  Wound cultured in ER prior to administration of antibiotics.  Begun on vancomycin and ceftriaxone until cultures resulted. Waylon Iglesias, attentive family at bedside. Creatinine 1.48 on admission with baseline of 1.2.  Of note, patient reports diarrhea x 4 days without use of antibiotics, additional GI symptoms. \"          10/7/18: Pt sitting up on side of the bed just finishing breakfast. No complaints. AF, NAD. WBC 8.8, Pt taking Coumadin 2.5mg qhs. INR 3.0.  10/8/18: POD1 s/p left knee debridement; awake in bed, no complaints. AF, VSS. Cx growing P. Mirabilis.    10/9/18: POD2 s/p left knee debridement; awake in chair, no complaints. AF, VSS. Cx growing P. Mirabilis. Vac placed today and functioning without leak  10/10/18: POD3 s/p left knee debridement; awake in chair, no complaints. AF, VSS. Cx growing P. Mirabilis. Vac functioning without leak or bleeding      This information was obtained from the patient  The following HPI elements were documented for the patient's wound:  Location: L below knee anterior   Duration: 8/28/2018  Severity:  No pain  Context:  Fall in bathroom on coumadin, secondary infection  Modifying Factors:  Nothing makes better or worse. Improved with debridement on 10/7/2018 and VAC  Quality:  n/a  Timing:  n/a   Associated Signs and Symptoms:        Past Medical History:   Diagnosis Date    Anticoagulated on Coumadin 7/9/2013    S/P AVR     CAD (coronary artery disease) 1/20/2013 5/8/14 PCI LAD with stent placed     Cardiomyopathy ([address]     CHF (congestive heart failure) ([address] 2/17/2017    CKD (chronic kidney disease) stage 3, GFR 30-59 ml/min ([facility]) 7/10/2013    COPD (chronic obstructive pulmonary disease) ([address] 4/2/2014    Essential hypertension, benign 1/20/2013    HLD (hyperlipidemia)     ICD (implantable cardioverter-defibrillator) in place 10/2/2014    B[employer]ron[employer] single-chamber ICD implantation 10/20/14     Iron deficiency anemia due to chronic blood loss 7/29/2009    MDS (myelodysplastic syndrome) ([address] 12/17/2011    Procrit started in August, 2011 12/18/11 Procrit weekly and Iron stores. 5-12 12-13-12 good response to 3 weekly procrit did not need it last time  3-7-13 Pt doing well. Just wanted a \"check-up. \" Responding to Procrit every three weeks.  8-29-13 patient has missed some injections on recently restarted hemoglobin only issue , takes oral iron iron stores each time       REJI (obstructive sleep apnea)-cpap 4/2/2014    Osteoarthritis     Osteopenia     Pulmonary hypertension ([address] 6/15/2016    Quadrantanopia     Skin defect 11/1/2018    Visual complaint 2015     Past Surgical History:   Procedure Laterality Date    CARDIAC CATHETERIZATION      HX AORTIC VALVE REPLACEMENT  ,     mechanical valve     HX  SECTION      HX CORONARY STENT PLACEMENT  May, 2014    STEMI with Stent placement.  HX ENDOSCOPY  2009    EGD    HX HEART CATHETERIZATION      HX PACEMAKER  10/2/2014    Biotronik ICD    HX TUBAL LIGATION      IR BX BONE MARROW DIAGNOSTIC  2011     Current Outpatient Medications   Medication Sig    traMADol (ULTRAM) 50 mg tablet Take 1 Tab by mouth every six (6) hours as needed for Pain. Max Daily Amount: 200 mg.  cholecalciferol, vitamin D3, (VITAMIN D3) 2,000 unit tab Take 2,000 Units by mouth daily.  warfarin (COUMADIN) 5 mg tablet Take 5 mg by mouth daily. Take as directed    spironolactone (ALDACTONE) 25 mg tablet Take 25 mg by mouth daily.  nitroglycerin (NITROSTAT) 0.4 mg SL tablet 1 Tab by SubLINGual route every five (5) minutes as needed for Chest Pain.  sertraline (ZOLOFT) 50 mg tablet Take 1 Tab by mouth daily.  simvastatin (ZOCOR) 20 mg tablet Take 1 Tab by mouth nightly.  sacubitril-valsartan (ENTRESTO) 24 mg/26 mg tablet Take 1 Tab by mouth two (2) times a day.  fluticasone-vilanterol (BREO ELLIPTA) 200-25 mcg/dose inhaler Take 1 Puff by inhalation daily.  OTHER Trilogy    mometasone-formoterol (DULERA) 200-5 mcg/actuation HFA inhaler 2 puffs bid, rinse mouth after use. (Patient taking differently: Take 2 Puffs by inhalation two (2) times daily as needed.)    isosorbide mononitrate ER (IMDUR) 30 mg tablet Take 30 mg by mouth daily.  aspirin delayed-release 81 mg tablet Take 81 mg by mouth daily.  warfarin (COUMADIN) 2.5 mg tablet Take 2.5 mg by mouth nightly. Needs 5mg on Friday    docusate sodium (COLACE) 50 mg capsule Take 50 mg by mouth daily.  carvedilol (COREG) 6.25 mg tablet Take 1 Tab by mouth two (2) times daily (with meals).  (Patient taking differently: Take 3.125 mg by mouth two (2) times daily (with meals). )    furosemide (LASIX) 40 mg tablet Take 1 Tab by mouth daily.  traZODone (DESYREL) 50 mg tablet Take 0.5-1 Tabs by mouth nightly. (Patient taking differently: Take 25-50 mg by mouth as needed.)    multivit-minerals/folic acid (ADULT ONE DAILY MULTIVITAMIN PO) Take 1 Tab by mouth daily.  ascorbic acid, vitamin C, (VITAMIN C) 500 mg tablet Take 500 mg by mouth daily.  ferrous gluconate 324 mg (38 mg iron) tablet TAKE 1 TAB BY MOUTH DAILY (BEFORE BREAKFAST). INDICATIONS: IRON DEFICIENCY ANEMIA (Patient taking differently: Take 325 mg by mouth two (2) times daily (with meals). )    fluticasone (FLONASE) 50 mcg/actuation nasal spray 2 Sprays by Both Nostrils route daily as needed. (Patient taking differently: 2 Sprays by Both Nostrils route daily as needed for Allergies.)    loratadine (CLARITIN) 10 mg tablet TAKE 1 TAB BY MOUTH DAILY. (Patient taking differently: TAKE 1 TAB BY MOUTH DAILY PRN)    albuterol (PROVENTIL VENTOLIN) 2.5 mg /3 mL (0.083 %) nebulizer solution 3 mL by Nebulization route every four (4) hours as needed for Wheezing.  albuterol (VENTOLIN HFA) 90 mcg/actuation inhaler 2 puffs qid prn (Patient taking differently: Take 2 Puffs by inhalation every six (6) hours as needed for Wheezing or Shortness of Breath.)    cholecalciferol, VITAMIN D3, (VITAMIN D3) 5,000 unit tab tablet Take 1 Tab by mouth daily. (Patient taking differently: Take 2,000 Units by mouth daily.)    acetaminophen (TYLENOL) 325 mg tablet Take 650 mg by mouth every four (4) hours as needed for Pain.  OXYGEN-AIR DELIVERY SYSTEMS 3 L by Nasal route continuous. No current facility-administered medications for this encounter.       ALLERGIES: Sulfa (sulfonamide antibiotics) and Aspirin  Social History     Socioeconomic History    Marital status:      Spouse name: Not on file    Number of children: Not on file    Years of education: Not on file    Highest education level: Not on file   Occupational History     Employer: RETIRED     Comment: [employer]   Tobacco Use    Smoking status: Former Smoker     Packs/day: 0.25     Years: 42.00     Pack years: 10.50     Types: Cigarettes     Start date: 10/1/1956     Last attempt to quit: 2014     Years since quittin.6    Smokeless tobacco: Never Used   Substance and Sexual Activity    Alcohol use: No     Alcohol/week: 0.0 oz    Drug use: No   Other Topics Concern    Weight Concern Yes    Special Diet Yes   Social History Narrative    Lives with her daughter. Latricia Baker and Sae High are caregivers. Ambulates only short distances. Social History     Tobacco Use   Smoking Status Former Smoker    Packs/day: 0.25    Years: 42.00    Pack years: 10.50    Types: Cigarettes    Start date: 10/1/1956    Last attempt to quit: 2014    Years since quittin.6   Smokeless Tobacco Never Used     Family History   Problem Relation Age of Onset    Heart Disease Mother         CHF    Hypertension Mother     Kidney Disease Mother     Heart Disease Father         CHF    Lung Disease Father     Diabetes Father     Cancer Father         prostate    Hypertension Father     Heart Attack Father     Heart Disease Maternal Aunt     Diabetes Brother     Coronary Artery Disease Other     Breast Cancer Neg Hx        ROS: The patient has no difficulty with chest pain or shortness of breath. No fever or chills. Comprehensive review of systems was otherwise unremarkable except as noted above. Physical Exam:   Visit Vitals  /64 (BP 1 Location: Right arm)   Pulse 68   Temp 98.2 °F (36.8 °C)   Resp 20   Ht 5' 2\" (1.575 m)   SpO2 96%   BMI 39.32 kg/m²     Constitutional: Alert, oriented, cooperative patient in no acute distress; appears stated age;  General appearance is within normal limits for wound care patient population. See the today's recorded vitals signs and constitutional data.   Eyes: Pupils equal; Sclera are clear. EOMs intact; The eyes appear to track and move normally. The sclera are not injected. The conjunctive are clear. The eyelids are normal. There is no scleral icterus. ENMT: There are no obvious external ear, nose, lip or mouth lesions. Nares normal; Neck: Overall contour of the neck is normal with no obvious neck masses. Gross hearing is within normal limits. No obvious neck masses  CV: RRR;  no JVD; No evidence of cyanosis of the upper extremities. The extremities are perfused without embolic sign, splinter hemorrhages, or petechia. Resp:  Breathing is non-labored; normal rate and effort; no audible wheezing. GI: soft and non-distended without acute abnormality noted. Musculoskeletal: unremarkable with normal function. No embolic signs or cyanosis. Neuro:  Oriented; moves all 4; no focal deficits  Psychiatric: Judgement and insight are within normal limits for the wound care population of patients. Patient is oriented to person, place, and time. Recent and remote memory are within normal limits. Mood and affect are within normal limits. Integumentary (Skin/Wounds)  See inspection of wound(s) below. There are no other skin areas of palpable concern. See wound center documentation including photos in the [address] Wound Template made part of this record by reference. Wounds:  Wound Knee Anterior; Left (Active)   DRESSING STATUS Clean, dry, and intact 12/27/2018  2:42 PM   DRESSING TYPE Negative pressure wound therapy 12/6/2018  3:57 PM   Non-Pressure Injury Full thickness (subcut/muscle) 12/13/2018  4:45 PM   Wound Length (cm) 5.7 cm 12/27/2018  2:42 PM   Wound Width (cm) 5.7 cm 12/27/2018  2:42 PM   Wound Depth (cm) 0.1 12/27/2018  2:42 PM   Wound Surface area (cm^2) 32.49 cm^2 12/27/2018  2:42 PM   Change in Wound Size % 42.25 12/27/2018  2:42 PM   Condition of Base Granulation 12/27/2018  2:42 PM   Condition of Edges Open 11/1/2018  1:43 PM   Tissue Type Red 11/1/2018  1:43 PM   Tissue Type Percent Red 100 11/1/2018  1:43 PM   Drainage Amount  Moderate 12/27/2018  2:42 PM   Drainage Color Sanguinous 12/27/2018  2:42 PM   Wound Odor None 12/27/2018  2:42 PM   Periwound Skin Condition Intact 12/27/2018  2:42 PM   Cleansing and Cleansing Agents  Normal saline 12/27/2018  2:42 PM   Procedure Tolerated Well 12/13/2018  4:45 PM   Number of days: 70       [REMOVED] Wound Knee Left (Removed)   Number of days: 29       [REMOVED] Wound (Removed)   Number of days: 6       [REMOVED] Wound Leg Lower Anterior; Inferior;Left;Lower (Removed)   Number of days:                           Recent vitals (if inpt):  Patient Vitals for the past 24 hrs:   BP Temp Pulse Resp SpO2 Height   12/27/18 1440 110/64 98.2 °F (36.8 °C) 68 20 96 % 5' 2\" (1.575 m)       Labs:  Recent Labs     12/26/18  1321   HGB 8.2*       Lab Results   Component Value Date/Time    WBC 6.7 11/19/2018 06:28 AM    HGB 8.2 (L) 12/26/2018 01:21 PM    PLATELET 251 74/09/0428 06:28 AM    Sodium 137 11/20/2018 05:48 AM    Potassium 4.1 11/20/2018 05:48 AM    Chloride 99 11/20/2018 05:48 AM    CO2 32 11/20/2018 05:48 AM    BUN 72 (H) 11/20/2018 05:48 AM    Creatinine 1.80 (H) 11/20/2018 05:48 AM    Glucose 126 (H) 11/20/2018 05:48 AM    INR 2.7 11/20/2018 05:48 AM    INR, External 2.1 12/18/2018    INR POC 2.1 (A) 12/18/2018 03:02 PM    aPTT 60.2 (H) 10/10/2018 04:02 AM    Bilirubin, total 0.6 11/15/2018 02:36 AM    AST (SGOT) 18 11/15/2018 02:36 AM    ALT (SGPT) 24 11/15/2018 02:36 AM    Alk. phosphatase 60 11/15/2018 02:36 AM    Amylase 88 01/31/2012 03:12 PM    Lipase 157 10/08/2018 04:26 AM    Lactic acid 0.7 02/16/2016 06:16 AM    Troponin-I <0.05 01/31/2012 03:32 PM    Troponin-I, Qt. 0.08 (H) 12/01/2016 02:20 AM       I reviewed recent labs and recent radiologic studies. I independently reviewed radiology images for studies I described above or studies I have ordered.    Admission date (for inpatients): 12/27/2018   * No surgery found *  * No surgery found *      ASSESSMENT/PLAN:  Significant improvement since discharge from the hospital status post debridement on 10/7/2018 and VAC placement. She had PuraplyAM skin substitute graft on 11/1/2018. The VAC was maintained for 1 week without change. The wound is large and full-thickness. She was scheduled for 1 week follow-up after evaluation on 11/8. She became hospitalized and missed her follow-up appointments. She was hospitalized with respiratory issues and is on home oxygen as well as her chronic anticoagulation. She has developed some bloody drainage from the [facility]. The wound has improved dramatically with 100% granulation base. PuraplyAM #2 was placed on 12/6 and well tolerated. We discontinued the VAC since it had filled with blood. However, on 12/20 her wound had a very bad odor. The graft was somewhat soupy. The graft was intact. There appears to be an over abundance of granulation. We did not place another graft, but dressed with Hydrofera Blue ready. The wound did come down but did not require any silver nitrate. However the dressing has come down from her knee. We will need to immobilize her knee better and wrap at higher to maintain a good dressing. This will be critical to perform any grafting procedure without stabilization with a VAC. I will see her back next week for reevaluation.     Problem List  Date Reviewed: 12/18/2018          Codes Class Noted    Acute exacerbation of CHF (congestive heart failure) ([facility] ICD-10-CM: I50.9  ICD-9-CM: 428.0  11/14/2018        Debility ICD-10-CM: R53.81  ICD-9-CM: 799.3  9/8/2018        Anemia (Chronic) ICD-10-CM: D64.9  ICD-9-CM: 285.9  9/7/2018        Chronic respiratory failure with hypoxia [facility]) ICD-10-CM: J96.11  ICD-9-CM: 518.83, 799.02  9/7/2018        Encounter for immunization ICD-10-CM: Z23  ICD-9-CM: V03.89  8/21/2018        Physical debility (Chronic) ICD-10-CM: R53.81  ICD-9-CM: 799.3  5/2/2018 Closed nondisplaced fracture of shaft of fifth metacarpal bone of left hand ICD-10-CM: S62.357A  ICD-9-CM: 815.03  4/28/2018        Type 2 diabetes mellitus with nephropathy (HCC) (Chronic) ICD-10-CM: E11.21  ICD-9-CM: 250.40, 583.81  12/18/2017        S/P AVR (aortic valve replacement) (Chronic) ICD-10-CM: Z95.2  ICD-9-CM: V43.3  Unknown    Overview Signed 2/23/2016 11:04 AM by Sonido Limon     Mechanical/Artific             Cardiomyopathy ([facility] (Chronic) ICD-10-CM: I42.9  ICD-9-CM: 425.4  Unknown        Osteopenia ICD-10-CM: M85.80  ICD-9-CM: 733.90  Unknown        HLD (hyperlipidemia) (Chronic) ICD-10-CM: E78.5  ICD-9-CM: 272.4  Unknown        Osteoarthritis ICD-10-CM: M19.90  ICD-9-CM: 715.90  Unknown        ICD (implantable cardioverter-defibrillator) in place ICD-10-CM: Z95.810  ICD-9-CM: V45.02  10/2/2014    Overview Signed 10/2/2014  4:58 PM by Susana Dasilva single-chamber ICD implantation 10/20/14             COPD (chronic obstructive pulmonary disease) ([facility] (Chronic) ICD-10-CM: J44.9  ICD-9-CM: 481  4/2/2014    Overview Signed 10/6/2018  8:56 PM by Poli Garcia NP     HOME OXYGEN 3 LITERS             REJI (obstructive sleep apnea)-cpap (Chronic) ICD-10-CM: G47.33  ICD-9-CM: 327.23  4/2/2014        CKD (chronic kidney disease) stage 3, GFR 30-59 ml/min (HCC) (Chronic) ICD-10-CM: N18.3  ICD-9-CM: 585.3  7/10/2013        Anticoagulated on Coumadin (Chronic) ICD-10-CM: Z51.81, Z79.01  ICD-9-CM: V58.83, V58.61  7/9/2013    Overview Signed 7/9/2013  4:16 PM by Sheila King NP     S/P AVR             CAD (coronary artery disease) (Chronic) ICD-10-CM: I25.10  ICD-9-CM: 414.00  1/20/2013    Overview Signed 5/20/2014 10:05 AM by Rj Pickering NP     5/8/14 PCI LAD with stent placed             Chronic combined systolic and diastolic heart failure ([facility] (Chronic) ICD-10-CM: I50.42  ICD-9-CM: 428.42  1/20/2013    Overview Addendum 4/28/2018  4:53 AM by Keneth Gosselin, MD 5/8/14 ECHO:  EF 10-15%  12/2017:  EF 25-30%             Essential hypertension, benign (Chronic) ICD-10-CM: I10  ICD-9-CM: 401.1  1/20/2013        MDS (myelodysplastic syndrome) (HCC) (Chronic) ICD-10-CM: D46.9  ICD-9-CM: 238.75  12/17/2011    Overview Addendum 8/29/2013 11:06 AM by Abi Stark started in August, 2011 12/18/11 Procrit weekly and Iron stores. 5-12 12-13-12 good response to 3 weekly procrit did not need it last time    3-7-13 Pt doing well. Just wanted a \"check-up. \" Responding to Procrit every three weeks. 8-29-13 patient has missed some injections on recently restarted hemoglobin only issue , takes oral iron iron stores each time                Iron deficiency anemia due to chronic blood loss (Chronic) ICD-10-CM: D50.0  ICD-9-CM: 280.0  7/29/2009            Active Problems:    * No active hospital problems. *       Any procedures done today in the wound center are documented in a separate note in connect care made part of this record by reference     Wound Care  Orders Placed This Encounter    WOUND CARE, DRESSING CHANGE     Left anterior knee wound:  Cleanse wound with normal saline. Apply Hydrofera Ready, ABD, and wrap with Coban 2. Home health to change dressing 3 times per week.      Standing Status:   Standing     Number of Occurrences:   1             Follow-up Information     Follow up With Specialties Details Why Contact Info    [address] In 1 week  Marcial Goodman [facility] [phone] [address] [address]          Signed:  Blanca Melgar MD,  FACS

## 2018-12-27 NOTE — WOUND CARE
12/27/18 1442   Wound Knee Anterior; Left   Date First Assessed/Time First Assessed: 10/18/18 1505   POA: Yes  Wound Type: Trauma  Location: Knee  Wound Description (Optional): #1  Orientation: Anterior; Left   DRESSING STATUS Clean, dry, and intact   DRESSING TYPE (hdrofera ready, abd, coban 2)   Wound Length (cm) 5.7 cm   Wound Width (cm) 5.7 cm   Wound Depth (cm) 0.1   Wound Surface area (cm^2) 32.49 cm^2   Change in Wound Size % 42.25   Condition of Base Granulation   Drainage Amount  Moderate   Drainage Color Sanguinous   Wound Odor None   Periwound Skin Condition Intact   Cleansing and Cleansing Agents  Normal saline

## 2018-12-28 ENCOUNTER — PATIENT OUTREACH (OUTPATIENT)
Dept: RESPIRATORY THERAPY | Age: 68
End: 2018-12-28

## 2018-12-28 ENCOUNTER — HOME CARE VISIT (OUTPATIENT)
Dept: SCHEDULING | Facility: HOME HEALTH | Age: 68
End: 2018-12-28
Payer: MEDICARE

## 2018-12-28 PROCEDURE — G0299 HHS/HOSPICE OF RN EA 15 MIN: HCPCS

## 2018-12-28 PROCEDURE — 3331090002 HH PPS REVENUE DEBIT

## 2018-12-28 PROCEDURE — 3331090001 HH PPS REVENUE CREDIT

## 2018-12-29 VITALS
DIASTOLIC BLOOD PRESSURE: 71 MMHG | TEMPERATURE: 98.1 F | RESPIRATION RATE: 20 BRPM | OXYGEN SATURATION: 96 % | HEART RATE: 73 BPM | SYSTOLIC BLOOD PRESSURE: 121 MMHG

## 2018-12-29 PROCEDURE — 3331090001 HH PPS REVENUE CREDIT

## 2018-12-29 PROCEDURE — 3331090002 HH PPS REVENUE DEBIT

## 2018-12-30 PROCEDURE — 3331090002 HH PPS REVENUE DEBIT

## 2018-12-30 PROCEDURE — 3331090001 HH PPS REVENUE CREDIT

## 2018-12-31 ENCOUNTER — HOME CARE VISIT (OUTPATIENT)
Dept: HOME HEALTH SERVICES | Facility: HOME HEALTH | Age: 68
End: 2018-12-31
Payer: MEDICARE

## 2018-12-31 ENCOUNTER — APPOINTMENT (OUTPATIENT)
Dept: GENERAL RADIOLOGY | Age: 68
DRG: 291 | End: 2018-12-31
Attending: EMERGENCY MEDICINE
Payer: MEDICARE

## 2018-12-31 ENCOUNTER — HOSPITAL ENCOUNTER (INPATIENT)
Age: 68
LOS: 2 days | Discharge: HOME HEALTH | DRG: 291 | End: 2019-01-02
Attending: EMERGENCY MEDICINE | Admitting: INTERNAL MEDICINE
Payer: MEDICARE

## 2018-12-31 ENCOUNTER — HOME CARE VISIT (OUTPATIENT)
Dept: SCHEDULING | Facility: HOME HEALTH | Age: 68
End: 2018-12-31
Payer: MEDICARE

## 2018-12-31 DIAGNOSIS — D64.9 ANEMIA, UNSPECIFIED TYPE: ICD-10-CM

## 2018-12-31 DIAGNOSIS — I50.9 ACUTE ON CHRONIC CONGESTIVE HEART FAILURE, UNSPECIFIED HEART FAILURE TYPE (HCC): Primary | ICD-10-CM

## 2018-12-31 PROBLEM — I50.23 SYSTOLIC CHF, ACUTE ON CHRONIC (HCC): Status: ACTIVE | Noted: 2018-12-31

## 2018-12-31 PROBLEM — I50.43 ACUTE ON CHRONIC COMBINED SYSTOLIC AND DIASTOLIC CHF (CONGESTIVE HEART FAILURE) (HCC): Status: ACTIVE | Noted: 2018-12-31

## 2018-12-31 LAB
ALBUMIN SERPL-MCNC: 3.3 G/DL (ref 3.2–4.6)
ALBUMIN/GLOB SERPL: 0.9 {RATIO} (ref 1.2–3.5)
ALP SERPL-CCNC: 59 U/L (ref 50–136)
ALT SERPL-CCNC: 18 U/L (ref 12–65)
ANION GAP SERPL CALC-SCNC: 7 MMOL/L (ref 7–16)
APPEARANCE UR: CLEAR
AST SERPL-CCNC: 13 U/L (ref 15–37)
BACTERIA URNS QL MICRO: 0 /HPF
BASOPHILS # BLD: 0.1 K/UL (ref 0–0.2)
BASOPHILS NFR BLD: 1 % (ref 0–2)
BILIRUB SERPL-MCNC: 0.6 MG/DL (ref 0.2–1.1)
BILIRUB UR QL: NEGATIVE
BNP SERPL-MCNC: 1151 PG/ML
BUN SERPL-MCNC: 20 MG/DL (ref 8–23)
CALCIUM SERPL-MCNC: 9.4 MG/DL (ref 8.3–10.4)
CASTS URNS QL MICRO: ABNORMAL /LPF
CHLORIDE SERPL-SCNC: 100 MMOL/L (ref 98–107)
CO2 SERPL-SCNC: 36 MMOL/L (ref 21–32)
COLOR UR: YELLOW
CREAT SERPL-MCNC: 1.26 MG/DL (ref 0.6–1)
DIFFERENTIAL METHOD BLD: ABNORMAL
EOSINOPHIL # BLD: 0.1 K/UL (ref 0–0.8)
EOSINOPHIL NFR BLD: 1 % (ref 0.5–7.8)
EPI CELLS #/AREA URNS HPF: ABNORMAL /HPF
ERYTHROCYTE [DISTWIDTH] IN BLOOD BY AUTOMATED COUNT: 15.5 % (ref 11.9–14.6)
GLOBULIN SER CALC-MCNC: 3.7 G/DL (ref 2.3–3.5)
GLUCOSE BLD STRIP.AUTO-MCNC: 155 MG/DL (ref 65–100)
GLUCOSE SERPL-MCNC: 206 MG/DL (ref 65–100)
GLUCOSE UR STRIP.AUTO-MCNC: NEGATIVE MG/DL
HCT VFR BLD AUTO: 28.5 % (ref 35.8–46.3)
HGB BLD-MCNC: 8.5 G/DL (ref 11.7–15.4)
HGB UR QL STRIP: NEGATIVE
IMM GRANULOCYTES # BLD: 0.1 K/UL (ref 0–0.5)
IMM GRANULOCYTES NFR BLD AUTO: 1 % (ref 0–5)
INR PPP: 1.9
KETONES UR QL STRIP.AUTO: NEGATIVE MG/DL
LEUKOCYTE ESTERASE UR QL STRIP.AUTO: NEGATIVE
LYMPHOCYTES # BLD: 0.9 K/UL (ref 0.5–4.6)
LYMPHOCYTES NFR BLD: 10 % (ref 13–44)
MCH RBC QN AUTO: 28.5 PG (ref 26.1–32.9)
MCHC RBC AUTO-ENTMCNC: 29.8 G/DL (ref 31.4–35)
MCV RBC AUTO: 95.6 FL (ref 79.6–97.8)
MONOCYTES # BLD: 0.5 K/UL (ref 0.1–1.3)
MONOCYTES NFR BLD: 6 % (ref 4–12)
NEUTS SEG # BLD: 7.2 K/UL (ref 1.7–8.2)
NEUTS SEG NFR BLD: 82 % (ref 43–78)
NITRITE UR QL STRIP.AUTO: NEGATIVE
NRBC # BLD: 0.02 K/UL (ref 0–0.2)
PH UR STRIP: 5.5 [PH] (ref 5–9)
PLATELET # BLD AUTO: 194 K/UL (ref 150–450)
PMV BLD AUTO: 11 FL (ref 9.4–12.3)
POTASSIUM SERPL-SCNC: 3.7 MMOL/L (ref 3.5–5.1)
PROT SERPL-MCNC: 7 G/DL (ref 6.3–8.2)
PROT UR STRIP-MCNC: 100 MG/DL
PROTHROMBIN TIME: 21.5 SEC (ref 11.7–14.5)
RBC # BLD AUTO: 2.98 M/UL (ref 4.05–5.2)
RBC #/AREA URNS HPF: ABNORMAL /HPF
SODIUM SERPL-SCNC: 143 MMOL/L (ref 136–145)
SP GR UR REFRACTOMETRY: 1.02 (ref 1–1.02)
TROPONIN I SERPL-MCNC: 0.05 NG/ML (ref 0.02–0.05)
UROBILINOGEN UR QL STRIP.AUTO: 1 EU/DL (ref 0.2–1)
WBC # BLD AUTO: 8.8 K/UL (ref 4.3–11.1)
WBC URNS QL MICRO: 0 /HPF

## 2018-12-31 PROCEDURE — 74011000250 HC RX REV CODE- 250: Performed by: INTERNAL MEDICINE

## 2018-12-31 PROCEDURE — 82962 GLUCOSE BLOOD TEST: CPT

## 2018-12-31 PROCEDURE — 83880 ASSAY OF NATRIURETIC PEPTIDE: CPT

## 2018-12-31 PROCEDURE — 85025 COMPLETE CBC W/AUTO DIFF WBC: CPT

## 2018-12-31 PROCEDURE — 94760 N-INVAS EAR/PLS OXIMETRY 1: CPT

## 2018-12-31 PROCEDURE — 81001 URINALYSIS AUTO W/SCOPE: CPT

## 2018-12-31 PROCEDURE — 74011636637 HC RX REV CODE- 636/637: Performed by: INTERNAL MEDICINE

## 2018-12-31 PROCEDURE — 96374 THER/PROPH/DIAG INJ IV PUSH: CPT | Performed by: EMERGENCY MEDICINE

## 2018-12-31 PROCEDURE — 80053 COMPREHEN METABOLIC PANEL: CPT

## 2018-12-31 PROCEDURE — 74011250636 HC RX REV CODE- 250/636: Performed by: EMERGENCY MEDICINE

## 2018-12-31 PROCEDURE — 74011250637 HC RX REV CODE- 250/637: Performed by: INTERNAL MEDICINE

## 2018-12-31 PROCEDURE — 84484 ASSAY OF TROPONIN QUANT: CPT

## 2018-12-31 PROCEDURE — 51702 INSERT TEMP BLADDER CATH: CPT | Performed by: EMERGENCY MEDICINE

## 2018-12-31 PROCEDURE — 94640 AIRWAY INHALATION TREATMENT: CPT

## 2018-12-31 PROCEDURE — 85610 PROTHROMBIN TIME: CPT

## 2018-12-31 PROCEDURE — 3331090001 HH PPS REVENUE CREDIT

## 2018-12-31 PROCEDURE — 3331090002 HH PPS REVENUE DEBIT

## 2018-12-31 PROCEDURE — 99285 EMERGENCY DEPT VISIT HI MDM: CPT | Performed by: EMERGENCY MEDICINE

## 2018-12-31 PROCEDURE — G0299 HHS/HOSPICE OF RN EA 15 MIN: HCPCS

## 2018-12-31 PROCEDURE — 65660000000 HC RM CCU STEPDOWN

## 2018-12-31 PROCEDURE — 93005 ELECTROCARDIOGRAM TRACING: CPT | Performed by: EMERGENCY MEDICINE

## 2018-12-31 PROCEDURE — 71045 X-RAY EXAM CHEST 1 VIEW: CPT

## 2018-12-31 PROCEDURE — 77030005518 HC CATH URETH FOL 2W BARD -B

## 2018-12-31 RX ORDER — WARFARIN SODIUM 5 MG/1
5 TABLET ORAL DAILY
Status: DISCONTINUED | OUTPATIENT
Start: 2019-01-01 | End: 2018-12-31 | Stop reason: DRUGHIGH

## 2018-12-31 RX ORDER — FERROUS GLUCONATE 324(38)MG
1 TABLET ORAL 2 TIMES DAILY WITH MEALS
Status: DISCONTINUED | OUTPATIENT
Start: 2019-01-01 | End: 2019-01-02 | Stop reason: HOSPADM

## 2018-12-31 RX ORDER — LORATADINE 10 MG/1
10 TABLET ORAL DAILY PRN
Status: DISCONTINUED | OUTPATIENT
Start: 2018-12-31 | End: 2019-01-02 | Stop reason: HOSPADM

## 2018-12-31 RX ORDER — MELATONIN
5000 DAILY
Status: DISCONTINUED | OUTPATIENT
Start: 2019-01-01 | End: 2018-12-31 | Stop reason: SDUPTHER

## 2018-12-31 RX ORDER — BUDESONIDE 0.5 MG/2ML
500 INHALANT ORAL
Status: DISCONTINUED | OUTPATIENT
Start: 2018-12-31 | End: 2019-01-02 | Stop reason: HOSPADM

## 2018-12-31 RX ORDER — DOCUSATE SODIUM 50 MG/5ML
50 LIQUID ORAL DAILY
Status: DISCONTINUED | OUTPATIENT
Start: 2019-01-01 | End: 2019-01-02 | Stop reason: HOSPADM

## 2018-12-31 RX ORDER — TRAMADOL HYDROCHLORIDE 50 MG/1
50 TABLET ORAL
Status: DISCONTINUED | OUTPATIENT
Start: 2018-12-31 | End: 2019-01-02 | Stop reason: HOSPADM

## 2018-12-31 RX ORDER — SODIUM CHLORIDE 0.9 % (FLUSH) 0.9 %
5-10 SYRINGE (ML) INJECTION EVERY 8 HOURS
Status: DISCONTINUED | OUTPATIENT
Start: 2018-12-31 | End: 2019-01-02 | Stop reason: HOSPADM

## 2018-12-31 RX ORDER — ACETAMINOPHEN 325 MG/1
650 TABLET ORAL
Status: DISCONTINUED | OUTPATIENT
Start: 2018-12-31 | End: 2019-01-02 | Stop reason: HOSPADM

## 2018-12-31 RX ORDER — ASCORBIC ACID 500 MG
500 TABLET ORAL DAILY
Status: DISCONTINUED | OUTPATIENT
Start: 2019-01-01 | End: 2019-01-02 | Stop reason: HOSPADM

## 2018-12-31 RX ORDER — SIMVASTATIN 20 MG/1
20 TABLET, FILM COATED ORAL
Status: DISCONTINUED | OUTPATIENT
Start: 2018-12-31 | End: 2019-01-02 | Stop reason: HOSPADM

## 2018-12-31 RX ORDER — SPIRONOLACTONE 25 MG/1
25 TABLET ORAL DAILY
Status: DISCONTINUED | OUTPATIENT
Start: 2019-01-01 | End: 2019-01-02 | Stop reason: HOSPADM

## 2018-12-31 RX ORDER — INSULIN LISPRO 100 [IU]/ML
0-10 INJECTION, SOLUTION INTRAVENOUS; SUBCUTANEOUS
Status: DISCONTINUED | OUTPATIENT
Start: 2018-12-31 | End: 2019-01-02 | Stop reason: HOSPADM

## 2018-12-31 RX ORDER — ACETAMINOPHEN 325 MG/1
650 TABLET ORAL
Status: DISCONTINUED | OUTPATIENT
Start: 2018-12-31 | End: 2018-12-31 | Stop reason: SDUPTHER

## 2018-12-31 RX ORDER — ONDANSETRON 2 MG/ML
4 INJECTION INTRAMUSCULAR; INTRAVENOUS
Status: DISCONTINUED | OUTPATIENT
Start: 2018-12-31 | End: 2019-01-02 | Stop reason: HOSPADM

## 2018-12-31 RX ORDER — FUROSEMIDE 10 MG/ML
40 INJECTION INTRAMUSCULAR; INTRAVENOUS DAILY
Status: DISCONTINUED | OUTPATIENT
Start: 2019-01-01 | End: 2019-01-01

## 2018-12-31 RX ORDER — ASPIRIN 81 MG/1
81 TABLET ORAL DAILY
Status: DISCONTINUED | OUTPATIENT
Start: 2019-01-01 | End: 2019-01-02 | Stop reason: HOSPADM

## 2018-12-31 RX ORDER — CARVEDILOL 6.25 MG/1
6.25 TABLET ORAL 2 TIMES DAILY WITH MEALS
Status: DISCONTINUED | OUTPATIENT
Start: 2019-01-01 | End: 2019-01-02 | Stop reason: HOSPADM

## 2018-12-31 RX ORDER — SODIUM CHLORIDE 0.9 % (FLUSH) 0.9 %
5-10 SYRINGE (ML) INJECTION AS NEEDED
Status: DISCONTINUED | OUTPATIENT
Start: 2018-12-31 | End: 2019-01-02 | Stop reason: HOSPADM

## 2018-12-31 RX ORDER — FUROSEMIDE 10 MG/ML
40 INJECTION INTRAMUSCULAR; INTRAVENOUS
Status: COMPLETED | OUTPATIENT
Start: 2018-12-31 | End: 2018-12-31

## 2018-12-31 RX ORDER — TRAZODONE HYDROCHLORIDE 50 MG/1
50 TABLET ORAL
Status: DISCONTINUED | OUTPATIENT
Start: 2018-12-31 | End: 2019-01-02 | Stop reason: HOSPADM

## 2018-12-31 RX ORDER — ISOSORBIDE MONONITRATE 30 MG/1
30 TABLET, EXTENDED RELEASE ORAL DAILY
Status: DISCONTINUED | OUTPATIENT
Start: 2019-01-01 | End: 2019-01-02 | Stop reason: HOSPADM

## 2018-12-31 RX ORDER — WARFARIN SODIUM 5 MG/1
5 TABLET ORAL
Status: DISCONTINUED | OUTPATIENT
Start: 2019-01-04 | End: 2019-01-02

## 2018-12-31 RX ORDER — SERTRALINE HYDROCHLORIDE 50 MG/1
50 TABLET, FILM COATED ORAL DAILY
Status: DISCONTINUED | OUTPATIENT
Start: 2019-01-01 | End: 2019-01-02 | Stop reason: HOSPADM

## 2018-12-31 RX ORDER — MELATONIN
2000 DAILY
Status: DISCONTINUED | OUTPATIENT
Start: 2019-01-01 | End: 2019-01-02 | Stop reason: HOSPADM

## 2018-12-31 RX ORDER — ALBUTEROL SULFATE 0.83 MG/ML
2.5 SOLUTION RESPIRATORY (INHALATION)
Status: DISCONTINUED | OUTPATIENT
Start: 2018-12-31 | End: 2019-01-02 | Stop reason: HOSPADM

## 2018-12-31 RX ORDER — HYDRALAZINE HYDROCHLORIDE 20 MG/ML
20 INJECTION INTRAMUSCULAR; INTRAVENOUS
Status: DISCONTINUED | OUTPATIENT
Start: 2018-12-31 | End: 2019-01-02 | Stop reason: HOSPADM

## 2018-12-31 RX ORDER — WARFARIN 2.5 MG/1
2.5 TABLET ORAL
Status: DISCONTINUED | OUTPATIENT
Start: 2018-12-31 | End: 2019-01-02

## 2018-12-31 RX ADMIN — Medication 10 ML: at 21:21

## 2018-12-31 RX ADMIN — SACUBITRIL AND VALSARTAN 1 TABLET: 24; 26 TABLET, FILM COATED ORAL at 21:20

## 2018-12-31 RX ADMIN — INSULIN LISPRO 2 UNITS: 100 INJECTION, SOLUTION INTRAVENOUS; SUBCUTANEOUS at 22:31

## 2018-12-31 RX ADMIN — TRAMADOL HYDROCHLORIDE 50 MG: 50 TABLET, FILM COATED ORAL at 20:30

## 2018-12-31 RX ADMIN — WARFARIN SODIUM 2.5 MG: 2.5 TABLET ORAL at 21:20

## 2018-12-31 RX ADMIN — BUDESONIDE 500 MCG: 0.5 INHALANT RESPIRATORY (INHALATION) at 22:25

## 2018-12-31 RX ADMIN — FUROSEMIDE 40 MG: 10 INJECTION, SOLUTION INTRAMUSCULAR; INTRAVENOUS at 14:56

## 2018-12-31 RX ADMIN — SIMVASTATIN 20 MG: 20 TABLET, FILM COATED ORAL at 21:20

## 2018-12-31 RX ADMIN — ALBUTEROL SULFATE 2.5 MG: 2.5 SOLUTION RESPIRATORY (INHALATION) at 22:25

## 2018-12-31 NOTE — ED NOTES
TRANSFER - OUT REPORT: 
 
Verbal report given to Adam Vegas RN(name) on [employer]  being transferred to [facility](unit) for routine progression of care Report consisted of patients Situation, Background, Assessment and  
Recommendations(SBAR). Information from the following report(s) SBAR and ED Summary was reviewed with the receiving nurse. Lines:  
Peripheral IV 12/31/18 Left Antecubital (Active) Site Assessment Clean, dry, & intact 12/31/2018  1:06 PM  
Phlebitis Assessment 0 12/31/2018  1:06 PM  
Infiltration Assessment 0 12/31/2018  1:06 PM  
Dressing Status Clean, dry, & intact 12/31/2018  1:06 PM  
  
 
Opportunity for questions and clarification was provided. Patient transported with: 
 O2 @ 4lpm nc.

## 2018-12-31 NOTE — ED TRIAGE NOTES
Pt arrives from home via ems c/o increasing SOB with exertion. Difficulties getting around her house. Mechanical valve present. Last time this happened, she had fluid around the heart. Difficulty sleeping last night, did not use BIPAP but thinks that may have had something to do with her having difficulties breathing. Normal oxygen saturations 91-93% on 3L NC chronically. Found today at 88-90% on her 3L. On 4L NC, pt remains at 95%.

## 2018-12-31 NOTE — ED PROVIDER NOTES
68-year-old American female with history of CHF and COPD requiring supplemental oxygen presents with increasing shortness of breath over the past 2 days. Minimal exertion aggravates or shortness of breath. Family has noted weight gain over the past week. No fever, cough, chest pain. Had similar symptoms approximately a month ago due to CHF. The history is provided by the patient. Shortness of Breath Associated symptoms include leg swelling. Pertinent negatives include no fever, no headaches, no neck pain, no cough, no chest pain, no vomiting, no abdominal pain and no rash. Past Medical History:  
Diagnosis Date  Anticoagulated on Coumadin 7/9/2013 S/P AVR  CAD (coronary artery disease) 1/20/2013 5/8/14 PCI LAD with stent placed  Cardiomyopathy ([address]  CHF (congestive heart failure) ([address] 2/17/2017  CKD (chronic kidney disease) stage 3, GFR 30-59 ml/min ([facility]) 7/10/2013  COPD (chronic obstructive pulmonary disease) ([address] 4/2/2014  Essential hypertension, benign 1/20/2013  HLD (hyperlipidemia)  ICD (implantable cardioverter-defibrillator) in place 10/2/2014 Biotronik single-chamber ICD implantation 10/20/14  Iron deficiency anemia due to chronic blood loss 7/29/2009  MDS (myelodysplastic syndrome) ([address] 12/17/2011 Procrit started in August, 2011 12/18/11 Procrit weekly and Iron stores. 5-12 12-13-12 good response to 3 weekly procrit did not need it last time  3-7-13 Pt doing well. Just wanted a \"check-up. \" Responding to Procrit every three weeks. 8-29-13 patient has missed some injections on recently restarted hemoglobin only issue , takes oral iron iron stores each time  REJI (obstructive sleep apnea)-cpap 4/2/2014  Osteoarthritis  Osteopenia  Pulmonary hypertension ([address] 6/15/2016  Quadrantanopia  Skin defect 11/1/2018  Visual complaint 12/14/2015 Past Surgical History:  
Procedure Laterality Date [address]    HX AORTIC VALVE REPLACEMENT  ,   
 mechanical valve 2005  HX  SECTION    
 HX CORONARY STENT PLACEMENT  May, 2014 STEMI with Stent placement.  HX ENDOSCOPY  2009 EGD [address] CATHETERIZATION    HX PACEMAKER  10/2/2014 Biotronik ICD  HX TUBAL LIGATION    
 IR BX BONE MARROW DIAGNOSTIC  2011 Family History:  
Problem Relation Age of Onset  Heart Disease Mother CHF  Hypertension Mother  Kidney Disease Mother  Heart Disease Father CHF  Lung Disease Father  Diabetes Father  Cancer Father   
     prostate  Hypertension Father  Heart Attack Father  Heart Disease Maternal Aunt  Diabetes Brother  Coronary Artery Disease Other  Breast Cancer Neg Hx Social History Socioeconomic History  Marital status:  Spouse name: Not on file  Number of children: Not on file  Years of education: Not on file  Highest education level: Not on file Social Needs  Financial resource strain: Not on file  Food insecurity - worry: Not on file  Food insecurity - inability: Not on file  Transportation needs - medical: Not on file  Transportation needs - non-medical: Not on file Occupational History Employer: RETIRED Comment: [employer] Tobacco Use  Smoking status: Former Smoker Packs/day: 0.25 Years: 42.00 Pack years: 10.50 Types: Cigarettes Start date: 10/1/1956 Last attempt to quit: 2014 Years since quittin.6  Smokeless tobacco: Never Used Substance and Sexual Activity  Alcohol use: No  
  Alcohol/week: 0.0 oz  Drug use: No  
 Sexual activity: Not on file Other Topics Concern [address] Service Not Asked  Blood Transfusions Not Asked  Caffeine Concern Not Asked  Occupational Exposure Not Asked Luzmaria Walker Hazards Not Asked  Sleep Concern Not Asked  Stress Concern Not Asked  Weight Concern Yes  Special Diet Yes  Back Care Not Asked  Exercise Not Asked  Bike Helmet Not Asked  Seat Belt Not Asked  Self-Exams Not Asked Social History Narrative Lives with her daughter. Kailee Davies and Sarai Edouard are caregivers. Ambulates only short distances. ALLERGIES: Sulfa (sulfonamide antibiotics) and Aspirin Review of Systems Constitutional: Negative for fever. HENT: Negative for congestion. Respiratory: Positive for shortness of breath. Negative for cough. Cardiovascular: Positive for leg swelling. Negative for chest pain. Gastrointestinal: Negative for abdominal pain, nausea and vomiting. Genitourinary: Negative for dysuria. Musculoskeletal: Negative for back pain and neck pain. Skin: Negative for rash. Neurological: Negative for headaches. Vitals:  
 12/31/18 1247 BP: 151/70 Pulse: 79 Resp: 20 Temp: 98.5 °F (36.9 °C) SpO2: 98% Weight: 97.1 kg (214 lb) Height: 5' 2\" (1.575 m) Physical Exam  
Constitutional: She is oriented to person, place, and time. She appears well-developed and well-nourished. She does not appear ill. No distress. HENT:  
Head: Normocephalic and atraumatic. Mouth/Throat: Oropharynx is clear and moist.  
Eyes: Pupils are equal, round, and reactive to light. Neck: Normal range of motion. No JVD present. Cardiovascular: Normal rate and regular rhythm. No murmur heard. Pulmonary/Chest: Effort normal and breath sounds normal. She has no wheezes. She has no rales. Abdominal: Soft. There is no tenderness. Musculoskeletal: Normal range of motion. She exhibits edema. Neurological: She is alert and oriented to person, place, and time. Skin: Skin is warm and dry. Psychiatric: She has a normal mood and affect. Her behavior is normal.  
Nursing note and vitals reviewed. MDM Number of Diagnoses or Management Options Acute on chronic congestive heart failure, unspecified heart failure type [facility]): Anemia, unspecified type:  
Diagnosis management comments: Lab work reveals mild anemia with a hemoglobin 8.5. Has trended down from 11 over the past Month. Patient was admitted in September of this year due to hemoglobin of 6.5 and required transfusion. Her that note patient's has suspected MDS and is followed by hematology. Workup reveals CHF on chest x-ray and elevated BNP 1100. Patient is being treated with IV Lasix. Patient's family reports she Has had significant rise in creatinine with diuresis and reports that she usually has to be admitted for this. We'll discuss with hospitalist for possible admission for treatment of CHF exacerbation. Amount and/or Complexity of Data Reviewed Clinical lab tests: ordered and reviewed Tests in the radiology section of CPT®: ordered and reviewed Risk of Complications, Morbidity, and/or Mortality Presenting problems: moderate Diagnostic procedures: moderate Management options: moderate Procedures

## 2018-12-31 NOTE — PROGRESS NOTES
Reviewing notes for spiritual concerns  knows patient well from previous admissions Always very pleasant to visit Will follow Kevin Hong, staff [address], [address] [address]  /   [email or web address]

## 2018-12-31 NOTE — H&P
HOSPITALIST H&P/CONSULT 
NAME:  Asia Hanna Age:  77 y.o. 
:   1950 MRN:   [id number] PCP: Angella Pinon MD 
Consulting MD: Treatment Team: Attending Provider: Flora Fernandez MD; Primary Nurse: Tianna Turner 
HPI:  
Patient 68F with pmhx of CAD, cardiomyopathy, CKD stage 3, COPD on home trilogy at night,  3L NC daytime, ICD, MDS, CHRIS, HTN, HLP, Pulmonary HTN, systolic CHF EF 66%-93%, mechanical aortic valve on coumadin presents with 2 days of increasing dyspnea with family report of weight gain over last week. Patient denies change in medication or diet. No fever, chills or productive cough. Usually wears trilogy at night but reports she didn't use it last night due to sensation of suffocating. Of note, pt with recent admission  -  for acute exacerbation of sCHF. She had FELIZ during course and entresto/spironolactone held at discharge until follow-up with cardiology. Appears as though her entresto was restarted but her spironolactone was not. ED workup notable for BNP 1151, INr 1.9, Cr 1.26 actually better than baseline. CXR suggestive of mild CHF. No focal consolidation. Hospitalist asked to admit for combined acute/chronic systolic and diastolic CHFe. Complete ROS done and is as stated in HPI or otherwise negative Past Medical History:  
Diagnosis Date  Anticoagulated on Coumadin 2013 S/P AVR  CAD (coronary artery disease) 2013 PCI LAD with stent placed  Cardiomyopathy ([address]  CHF (congestive heart failure) [facility] 2017  CKD (chronic kidney disease) stage 3, GFR 30-59 ml/min ([facility]) 7/10/2013  COPD (chronic obstructive pulmonary disease) ([address] 2014  Essential hypertension, benign 2013  HLD (hyperlipidemia)  ICD (implantable cardioverter-defibrillator) in place 10/2/2014 Biotronik single-chamber ICD implantation 10/20/14  Iron deficiency anemia due to chronic blood loss 2009  MDS (myelodysplastic syndrome) [facility] 2011 Procrit started in 11 Procrit weekly and Iron stores. 12 good response to 3 weekly procrit did not need it last time  3-7-13 Pt doing well. Just wanted a \"check-up. \" Responding to Procrit every three weeks. 13 patient has missed some injections on recently restarted hemoglobin only issue , takes oral iron iron stores each time  REJI (obstructive sleep apnea)-cpap 2014  Osteoarthritis  Osteopenia  Pulmonary hypertension ([facility] 6/15/2016  Quadrantanopia  Skin defect 2018  Visual complaint 2015 Past Surgical History:  
Procedure Laterality Date [address]    HX AORTIC VALVE REPLACEMENT  ,   
 mechanical valve   HX  SECTION    
 HX CORONARY STENT PLACEMENT  May, 2014 STEMI with Stent placement.  HX ENDOSCOPY  2009 EGD [address] CATHETERIZATION    HX PACEMAKER  10/2/2014 Biotronik ICD  HX TUBAL LIGATION    
 IR BX BONE MARROW DIAGNOSTIC  2011 Prior to Admission Medications Prescriptions Last Dose Informant Patient Reported? Taking? OTHER   Yes No  
Sig: Trilogy OXYGEN-AIR DELIVERY SYSTEMS   Yes No  
Sig: 3 L by Nasal route continuous. acetaminophen (TYLENOL) 325 mg tablet   Yes No  
Sig: Take 650 mg by mouth every four (4) hours as needed for Pain. albuterol (PROVENTIL VENTOLIN) 2.5 mg /3 mL (0.083 %) nebulizer solution   No No  
Sig: 3 mL by Nebulization route every four (4) hours as needed for Wheezing. albuterol (VENTOLIN HFA) 90 mcg/actuation inhaler   No No  
Si puffs qid prn Patient taking differently: Take 2 Puffs by inhalation every six (6) hours as needed for Wheezing or Shortness of Breath. ascorbic acid, vitamin C, (VITAMIN C) 500 mg tablet   Yes No  
Sig: Take 500 mg by mouth daily. aspirin delayed-release 81 mg tablet   Yes No  
Sig: Take 81 mg by mouth daily. carvedilol (COREG) 6.25 mg tablet   No No  
Sig: Take 1 Tab by mouth two (2) times daily (with meals). Patient taking differently: Take 3.125 mg by mouth two (2) times daily (with meals). cholecalciferol, VITAMIN D3, (VITAMIN D3) 5,000 unit tab tablet   No No  
Sig: Take 1 Tab by mouth daily. Patient taking differently: Take 2,000 Units by mouth daily. cholecalciferol, vitamin D3, (VITAMIN D3) 2,000 unit tab   Yes No  
Sig: Take 2,000 Units by mouth daily. docusate sodium (COLACE) 50 mg capsule   Yes No  
Sig: Take 50 mg by mouth daily. ferrous gluconate 324 mg (38 mg iron) tablet   No No  
Sig: TAKE 1 TAB BY MOUTH DAILY (BEFORE BREAKFAST). INDICATIONS: IRON DEFICIENCY ANEMIA Patient taking differently: Take 325 mg by mouth two (2) times daily (with meals). fluticasone (FLONASE) 50 mcg/actuation nasal spray   No No  
Si Sprays by Both Nostrils route daily as needed. Patient taking differently: 2 Sprays by Both Nostrils route daily as needed for Allergies. fluticasone-vilanterol (BREO ELLIPTA) 200-25 mcg/dose inhaler   No No  
Sig: Take 1 Puff by inhalation daily. furosemide (LASIX) 40 mg tablet   No No  
Sig: Take 1 Tab by mouth daily. isosorbide mononitrate ER (IMDUR) 30 mg tablet   Yes No  
Sig: Take 30 mg by mouth daily. loratadine (CLARITIN) 10 mg tablet   No No  
Sig: TAKE 1 TAB BY MOUTH DAILY. Patient taking differently: TAKE 1 TAB BY MOUTH DAILY PRN  
mometasone-formoterol (DULERA) 200-5 mcg/actuation HFA inhaler   No No  
Si puffs bid, rinse mouth after use. Patient taking differently: Take 2 Puffs by inhalation two (2) times daily as needed. multivit-minerals/folic acid (ADULT ONE DAILY MULTIVITAMIN PO)   Yes No  
Sig: Take 1 Tab by mouth daily. nitroglycerin (NITROSTAT) 0.4 mg SL tablet   No No  
Si Tab by SubLINGual route every five (5) minutes as needed for Chest Pain. sacubitril-valsartan (ENTRESTO) 24 mg/26 mg tablet   No No  
Sig: Take 1 Tab by mouth two (2) times a day. sertraline (ZOLOFT) 50 mg tablet   No No  
Sig: Take 1 Tab by mouth daily. simvastatin (ZOCOR) 20 mg tablet   No No  
Sig: Take 1 Tab by mouth nightly. spironolactone (ALDACTONE) 25 mg tablet   Yes No  
Sig: Take 25 mg by mouth daily. traMADol (ULTRAM) 50 mg tablet   No No  
Sig: Take 1 Tab by mouth every six (6) hours as needed for Pain. Max Daily Amount: 200 mg.  
traZODone (DESYREL) 50 mg tablet   No No  
Sig: Take 0.5-1 Tabs by mouth nightly. Patient taking differently: Take 25-50 mg by mouth as needed. warfarin (COUMADIN) 2.5 mg tablet   Yes No  
Sig: Take 2.5 mg by mouth nightly. Needs 5mg on Friday  
warfarin (COUMADIN) 5 mg tablet   Yes No  
Sig: Take 5 mg by mouth daily. Take as directed Facility-Administered Medications: None Allergies Allergen Reactions  Sulfa (Sulfonamide Antibiotics) Hives  Aspirin Nausea Only Cannot take uncoated aspirin Social History Tobacco Use  Smoking status: Former Smoker Packs/day: 0.25 Years: 42.00 Pack years: 10.50 Types: Cigarettes Start date: 10/1/1956 Last attempt to quit: 2014 Years since quittin.6  Smokeless tobacco: Never Used Substance Use Topics  Alcohol use: No  
  Alcohol/week: 0.0 oz Family History Problem Relation Age of Onset  Heart Disease Mother CHF  Hypertension Mother  Kidney Disease Mother  Heart Disease Father CHF  Lung Disease Father  Diabetes Father  Cancer Father   
     prostate  Hypertension Father  Heart Attack Father  Heart Disease Maternal Aunt  Diabetes Brother  Coronary Artery Disease Other  Breast Cancer Neg Hx Objective:  
 
Visit Vitals /86 Pulse 71 Temp 98.5 °F (36.9 °C) Resp 23 Ht 5' 2\" (1.575 m) Wt 97.1 kg (214 lb) SpO2 100% BMI 39.14 kg/m² Temp (24hrs), Av.5 °F (36.9 °C), Min:98.5 °F (36.9 °C), Max:98.5 °F (36.9 °C) Oxygen Therapy O2 Sat (%): 100 % (18 1746) Pulse via Oximetry: 71 beats per minute (18 1746) O2 Device: Nasal cannula (18 1247) O2 Flow Rate (L/min): 4 l/min (18 1247) Physical Exam: 
General:    Alert, cooperative, no distress, appears stated age. Head:   Normocephalic, without obvious abnormality, atraumatic. Nose:  Nares normal. No drainage or sinus tenderness. Lungs:   Coarse breath sounds bilaterally. No wheezing Heart:   Regular rate and rhythm,  no murmur, rub or gallop. Abdomen:   Soft, non-tender. Not distended. Bowel sounds normal.  
Extremities: No cyanosis. +1 pitting edema of RLE. LLE in ACE wrap with wound on knee. Skin:     Texture, turgor normal. No rashes or lesions. Not Jaundiced Neurologic: Alert and oriented x 3, no focal deficits Data Review:  
Recent Results (from the past 24 hour(s)) EKG, 12 LEAD, INITIAL Collection Time: 18 12:52 PM  
Result Value Ref Range Ventricular Rate 80 BPM  
 Atrial Rate 80 BPM  
 P-R Interval 168 ms QRS Duration 126 ms  
 Q-T Interval 376 ms QTC Calculation (Bezet) 433 ms Calculated P Axis 36 degrees Calculated R Axis -45 degrees Calculated T Axis 118 degrees Diagnosis    
  !! AGE AND GENDER SPECIFIC ECG ANALYSIS !! Sinus rhythm with occasional Premature ventricular complexes Left axis deviation Left bundle branch block Abnormal ECG When compared with ECG of 2018 10:18, 
Premature ventricular complexes are now Present Left bundle branch block has replaced Non-specific intra-ventricular  
conduction block Minimal criteria for Anterior infarct are no longer Present CBC WITH AUTOMATED DIFF Collection Time: 18 12:56 PM  
Result Value Ref Range WBC 8.8 4.3 - 11.1 K/uL  
 RBC 2.98 (L) 4.05 - 5.2 M/uL HGB 8.5 (L) 11.7 - 15.4 g/dL HCT 28.5 (L) 35.8 - 46.3 % MCV 95.6 79.6 - 97.8 FL  
 MCH 28.5 26.1 - 32.9 PG  
 MCHC 29.8 (L) 31.4 - 35.0 g/dL  
 RDW 15.5 (H) 11.9 - 14.6 % PLATELET 382 891 - 715 K/uL MPV 11.0 9.4 - 12.3 FL ABSOLUTE NRBC 0.02 0.0 - 0.2 K/uL  
 DF AUTOMATED NEUTROPHILS 82 (H) 43 - 78 % LYMPHOCYTES 10 (L) 13 - 44 % MONOCYTES 6 4.0 - 12.0 % EOSINOPHILS 1 0.5 - 7.8 % BASOPHILS 1 0.0 - 2.0 % IMMATURE GRANULOCYTES 1 0.0 - 5.0 %  
 ABS. NEUTROPHILS 7.2 1.7 - 8.2 K/UL  
 ABS. LYMPHOCYTES 0.9 0.5 - 4.6 K/UL  
 ABS. MONOCYTES 0.5 0.1 - 1.3 K/UL  
 ABS. EOSINOPHILS 0.1 0.0 - 0.8 K/UL  
 ABS. BASOPHILS 0.1 0.0 - 0.2 K/UL  
 ABS. IMM. GRANS. 0.1 0.0 - 0.5 K/UL METABOLIC PANEL, COMPREHENSIVE Collection Time: 12/31/18 12:56 PM  
Result Value Ref Range Sodium 143 136 - 145 mmol/L Potassium 3.7 3.5 - 5.1 mmol/L Chloride 100 98 - 107 mmol/L  
 CO2 36 (H) 21 - 32 mmol/L Anion gap 7 7 - 16 mmol/L Glucose 206 (H) 65 - 100 mg/dL BUN 20 8 - 23 MG/DL Creatinine 1.26 (H) 0.6 - 1.0 MG/DL  
 GFR est AA 54 (L) >60 ml/min/1.73m2 GFR est non-AA 45 (L) >60 ml/min/1.73m2 Calcium 9.4 8.3 - 10.4 MG/DL Bilirubin, total 0.6 0.2 - 1.1 MG/DL  
 ALT (SGPT) 18 12 - 65 U/L  
 AST (SGOT) 13 (L) 15 - 37 U/L Alk. phosphatase 59 50 - 136 U/L Protein, total 7.0 6.3 - 8.2 g/dL Albumin 3.3 3.2 - 4.6 g/dL Globulin 3.7 (H) 2.3 - 3.5 g/dL A-G Ratio 0.9 (L) 1.2 - 3.5    
TROPONIN I Collection Time: 12/31/18 12:56 PM  
Result Value Ref Range Troponin-I, Qt. 0.05 0.02 - 0.05 NG/ML  
BNP Collection Time: 12/31/18 12:56 PM  
Result Value Ref Range BNP 1,151 (H) 0 pg/mL PROTHROMBIN TIME + INR Collection Time: 12/31/18 12:56 PM  
Result Value Ref Range Prothrombin time 21.5 (H) 11.7 - 14.5 sec INR 1.9 URINALYSIS W/ RFLX MICROSCOPIC Collection Time: 12/31/18  2:54 PM  
Result Value Ref Range Color YELLOW Appearance CLEAR Specific gravity 1.017 1.001 - 1.023    
 pH (UA) 5.5 5.0 - 9.0 Protein 100 (A) NEG mg/dL Glucose NEGATIVE  mg/dL Ketone NEGATIVE  NEG mg/dL Bilirubin NEGATIVE  NEG Blood NEGATIVE  NEG Urobilinogen 1.0 0.2 - 1.0 EU/dL Nitrites NEGATIVE  NEG Leukocyte Esterase NEGATIVE  NEG    
 WBC 0 0 /hpf  
 RBC 0-3 0 /hpf Epithelial cells 0-3 0 /hpf Bacteria 0 0 /hpf Casts 0-3 0 /lpf Imaging [facility] PORTABLE CHEST, December 31, 2018 at 1339 hours 
  
CLINICAL HISTORY:  Worsening shortness of breath over the last few weeks. History of coronary stenting and aortic valve replacement. 
  
COMPARISON:  November 14, 2018. 
  
FINDINGS:  AP erect image demonstrates no confluent infiltrate or significant 
pleural fluid. The heart is moderately enlarged status post median sternotomy 
and reported valve repair with mild pulmonary venous congestion and perihilar 
edema in a pattern suspicious for mild congestive heart failure in this clinical 
setting. No definite pneumothorax. The bony thorax appears intact on this 
view. There are overlying radiopaque support devices. 
  
IMPRESSION IMPRESSION:  MODERATE CARDIOMEGALY WITH FINDINGS SUSPICIOUS FOR MILD CONGESTIVE HEART FAILURE. Assessment and Plan: Active Hospital Problems Diagnosis Date Noted  Acute on chronic combined systolic and diastolic CHF (congestive heart failure) ([address] 12/31/2018  Debility 09/08/2018  Chronic respiratory failure with hypoxia ([address] 09/07/2018  Type 2 diabetes mellitus with nephropathy ([address] 12/18/2017  S/P AVR (aortic valve replacement) Mechanical/Artific  HLD (hyperlipidemia)  COPD (chronic obstructive pulmonary disease) ([address] 04/02/2014 HOME OXYGEN 3 LITERS 
  
 CKD (chronic kidney disease) stage 3, GFR 30-59 ml/min ([facility]) 07/10/2013  Anticoagulated on Coumadin 07/09/2013 S/P AVR  Essential hypertension, benign 01/20/2013  MDS (myelodysplastic syndrome) ([address] 12/17/2011 Procrit started in August, 2011 12/18/11 Procrit weekly and Iron stores. 5-12 12-13-12 good response to 3 weekly procrit did not need it last time 3-7-13 Pt doing well. Just wanted a \"check-up. \" Responding to Procrit every three weeks. 8-29-13 patient has missed some injections on recently restarted hemoglobin only issue , takes oral iron iron stores each time  Iron deficiency anemia due to chronic blood loss 07/29/2009 A/p 
-  Ac/Chronic systolic CHF - EF in Nov of 20-25%. Grade 2 DD. Consult cardiology. Lasix IV 40 in ED with good UOP already. Will plan for repeat dose in AM and repeat BNP. Continue entresto, imdur and resume aldactone. - COPD/REJI - resume home bronchodilators. Do not feel this is COPDe. Requires 3lnc continuous at home. RT consulted to resume trilogy qhs.  
 
- CKD stage 3 - Cr close to baseline. Monitor with diuresis. - hx of AVR - continue coumadin, pharmacy consulted. INR 1.9 today. - CHRIS and MDS - resume iron supplement. Hgb has drifted from 11/19 being 9.9 to 8.5 today. No evidence of active bleeding. Monitor daily - Dm2 - SSI. ha1c 6.1 in November 
 
- HTN -resume home meds, prn hydralazine - Left knee wound - slow healing. Has been seeing Dr Marleny Rios and has had wound vac up until recently. Will request wound consult Code Status:  Full code as d/w pt today Anticipated discharge: 2-3 days Signed By: Rachna Garcia,    
 December 31, 2018

## 2018-12-31 NOTE — PROGRESS NOTES
Warfarin dosing per pharmacist 
 
Anoop Bill Martinez is a 77 y.o. female. Height: 5' 2\" (157.5 cm)    Weight: 97.1 kg (214 lb) Indication: s/p aortic valve replacement Goal INR:  2-3 Home dose:  2.5 mg every day except Fridays when she takes 5 mg Risk factors or significant drug interactions:  none Other anticoagulants:  none Daily Monitoring Date  INR     Warfarin dose HGB              Notes 12/31  1.9  2.5 mg  8.5 Pharmacy has been consulted to dose warfarin this patient with a history of an aortic valve replacement. She presents this admission with a slightly subtherapeutic INR. INR subtherapeutic at 1.9 today. Will resume patient's home warfarin regimen at this time of warfarin 2.5 mg daily. Daily INR. Will continue following. Thank you, Daiana White, PharmD, BCPS Clinical Pharmacist 
[phone]

## 2019-01-01 ENCOUNTER — PATIENT OUTREACH (OUTPATIENT)
Dept: CASE MANAGEMENT | Age: 69
End: 2019-01-01

## 2019-01-01 ENCOUNTER — HOME HEALTH ADMISSION (OUTPATIENT)
Dept: HOME HEALTH SERVICES | Facility: HOME HEALTH | Age: 69
End: 2019-01-01
Payer: MEDICARE

## 2019-01-01 ENCOUNTER — HOME CARE VISIT (OUTPATIENT)
Dept: SCHEDULING | Facility: HOME HEALTH | Age: 69
End: 2019-01-01
Payer: MEDICARE

## 2019-01-01 ENCOUNTER — HOSPITAL ENCOUNTER (INPATIENT)
Age: 69
LOS: 15 days | Discharge: HOME HEALTH | DRG: 208 | End: 2019-08-22
Attending: EMERGENCY MEDICINE | Admitting: INTERNAL MEDICINE
Payer: MEDICARE

## 2019-01-01 ENCOUNTER — HOSPITAL ENCOUNTER (OUTPATIENT)
Dept: INFUSION THERAPY | Age: 69
Discharge: HOME | End: 2019-06-27
Payer: COMMERCIAL

## 2019-01-01 ENCOUNTER — HOSPITAL ENCOUNTER (INPATIENT)
Age: 69
LOS: 5 days | Discharge: HOME HEALTH | DRG: 291 | End: 2019-08-02
Attending: EMERGENCY MEDICINE | Admitting: HOSPITALIST
Payer: MEDICARE

## 2019-01-01 ENCOUNTER — ANESTHESIA EVENT (OUTPATIENT)
Dept: ENDOSCOPY | Age: 69
DRG: 393 | End: 2019-01-01
Payer: MEDICARE

## 2019-01-01 ENCOUNTER — HOSPITAL ENCOUNTER (OUTPATIENT)
Dept: INFUSION THERAPY | Age: 69
Discharge: HOME | End: 2019-04-30
Payer: MEDICARE

## 2019-01-01 ENCOUNTER — HOME CARE VISIT (OUTPATIENT)
Dept: HOME HEALTH SERVICES | Facility: HOME HEALTH | Age: 69
End: 2019-01-01
Payer: MEDICARE

## 2019-01-01 ENCOUNTER — APPOINTMENT (OUTPATIENT)
Dept: GENERAL RADIOLOGY | Age: 69
DRG: 208 | End: 2019-01-01
Attending: INTERNAL MEDICINE
Payer: MEDICARE

## 2019-01-01 ENCOUNTER — APPOINTMENT (OUTPATIENT)
Dept: GENERAL RADIOLOGY | Age: 69
DRG: 393 | End: 2019-01-01
Attending: INTERNAL MEDICINE
Payer: MEDICARE

## 2019-01-01 ENCOUNTER — HOSPITAL ENCOUNTER (OUTPATIENT)
Dept: LAB | Age: 69
Discharge: HOME | End: 2019-07-01

## 2019-01-01 ENCOUNTER — APPOINTMENT (OUTPATIENT)
Dept: GENERAL RADIOLOGY | Age: 69
DRG: 393 | End: 2019-01-01
Attending: EMERGENCY MEDICINE
Payer: MEDICARE

## 2019-01-01 ENCOUNTER — HOSPITAL ENCOUNTER (INPATIENT)
Age: 69
LOS: 12 days | Discharge: NURSING, REHAB OR LONG-TERM CARE | DRG: 393 | End: 2019-06-18
Attending: EMERGENCY MEDICINE | Admitting: INTERNAL MEDICINE
Payer: MEDICARE

## 2019-01-01 ENCOUNTER — HOSPITAL ENCOUNTER (OUTPATIENT)
Dept: INFUSION THERAPY | Age: 69
Discharge: HOME | End: 2019-12-17
Payer: MEDICARE

## 2019-01-01 ENCOUNTER — HOSPITAL ENCOUNTER (INPATIENT)
Dept: GENERAL RADIOLOGY | Age: 69
Discharge: HOME | DRG: 393 | End: 2019-06-06
Attending: NURSE PRACTITIONER
Payer: MEDICARE

## 2019-01-01 ENCOUNTER — HOME CARE VISIT (OUTPATIENT)
Dept: SCHEDULING | Facility: HOME HEALTH | Age: 69
End: 2019-01-01

## 2019-01-01 ENCOUNTER — HOSPITAL ENCOUNTER (OUTPATIENT)
Dept: INFUSION THERAPY | Age: 69
Discharge: HOME | End: 2019-07-02
Payer: MEDICARE

## 2019-01-01 ENCOUNTER — HOSPITAL ENCOUNTER (OUTPATIENT)
Dept: INFUSION THERAPY | Age: 69
Discharge: HOME | End: 2019-09-24
Payer: MEDICARE

## 2019-01-01 ENCOUNTER — HOSPITAL ENCOUNTER (OUTPATIENT)
Dept: WOUND CARE | Age: 69
End: 2019-01-01
Attending: SURGERY
Payer: MEDICARE

## 2019-01-01 ENCOUNTER — APPOINTMENT (OUTPATIENT)
Dept: GENERAL RADIOLOGY | Age: 69
DRG: 291 | End: 2019-01-01
Attending: EMERGENCY MEDICINE
Payer: MEDICARE

## 2019-01-01 ENCOUNTER — HOSPITAL ENCOUNTER (OUTPATIENT)
Dept: WOUND CARE | Age: 69
Discharge: HOME | End: 2019-05-09
Attending: SURGERY
Payer: MEDICARE

## 2019-01-01 ENCOUNTER — HOSPITAL ENCOUNTER (OUTPATIENT)
Dept: LAB | Age: 69
Discharge: HOME | End: 2019-07-02
Payer: MEDICARE

## 2019-01-01 ENCOUNTER — HOSPITAL ENCOUNTER (OUTPATIENT)
Dept: INFUSION THERAPY | Age: 69
Discharge: HOME | End: 2019-11-26
Payer: MEDICARE

## 2019-01-01 ENCOUNTER — HOSPITAL ENCOUNTER (OUTPATIENT)
Dept: INFUSION THERAPY | Age: 69
Discharge: HOME | End: 2019-03-19
Payer: MEDICARE

## 2019-01-01 ENCOUNTER — ANESTHESIA (OUTPATIENT)
Dept: ENDOSCOPY | Age: 69
DRG: 393 | End: 2019-01-01
Payer: MEDICARE

## 2019-01-01 ENCOUNTER — HOSPITAL ENCOUNTER (OUTPATIENT)
Dept: WOUND CARE | Age: 69
Discharge: HOME | End: 2019-03-07
Attending: SURGERY
Payer: MEDICARE

## 2019-01-01 ENCOUNTER — HOSPITAL ENCOUNTER (OUTPATIENT)
Dept: INFUSION THERAPY | Age: 69
Discharge: HOME | End: 2019-04-09
Payer: MEDICARE

## 2019-01-01 ENCOUNTER — HOSPITAL ENCOUNTER (OUTPATIENT)
Dept: LAB | Age: 69
Discharge: HOME | End: 2019-12-17
Payer: MEDICARE

## 2019-01-01 ENCOUNTER — APPOINTMENT (OUTPATIENT)
Dept: CT IMAGING | Age: 69
DRG: 291 | End: 2019-01-01
Attending: EMERGENCY MEDICINE
Payer: MEDICARE

## 2019-01-01 ENCOUNTER — HOSPITAL ENCOUNTER (OUTPATIENT)
Dept: LAB | Age: 69
Discharge: HOME | End: 2019-06-03
Attending: INTERNAL MEDICINE

## 2019-01-01 ENCOUNTER — APPOINTMENT (OUTPATIENT)
Dept: ULTRASOUND IMAGING | Age: 69
DRG: 393 | End: 2019-01-01
Attending: INTERNAL MEDICINE
Payer: MEDICARE

## 2019-01-01 ENCOUNTER — HOSPITAL ENCOUNTER (OUTPATIENT)
Dept: LAB | Age: 69
Discharge: HOME | End: 2019-06-23

## 2019-01-01 ENCOUNTER — HOSPITAL ENCOUNTER (OUTPATIENT)
Dept: INFUSION THERAPY | Age: 69
Discharge: HOME | End: 2019-09-03
Payer: MEDICARE

## 2019-01-01 ENCOUNTER — HOSPITAL ENCOUNTER (OUTPATIENT)
Dept: LAB | Age: 69
Discharge: HOME | End: 2019-11-05
Payer: MEDICARE

## 2019-01-01 ENCOUNTER — HOME CARE VISIT (OUTPATIENT)
Dept: HOME HEALTH SERVICES | Facility: HOME HEALTH | Age: 69
End: 2019-01-01

## 2019-01-01 ENCOUNTER — HOSPITAL ENCOUNTER (OUTPATIENT)
Dept: INFUSION THERAPY | Age: 69
Discharge: HOME | End: 2019-10-15
Payer: MEDICARE

## 2019-01-01 ENCOUNTER — HOSPITAL ENCOUNTER (OUTPATIENT)
Dept: WOUND CARE | Age: 69
Discharge: HOME | End: 2019-03-21
Attending: SURGERY
Payer: MEDICARE

## 2019-01-01 ENCOUNTER — APPOINTMENT (OUTPATIENT)
Dept: GENERAL RADIOLOGY | Age: 69
DRG: 208 | End: 2019-01-01
Attending: EMERGENCY MEDICINE
Payer: MEDICARE

## 2019-01-01 ENCOUNTER — HOSPITAL ENCOUNTER (OUTPATIENT)
Dept: LAB | Age: 69
Discharge: HOME | End: 2019-04-09
Payer: MEDICARE

## 2019-01-01 ENCOUNTER — APPOINTMENT (OUTPATIENT)
Dept: INFUSION THERAPY | Age: 69
End: 2019-01-01

## 2019-01-01 ENCOUNTER — HOSPITAL ENCOUNTER (OUTPATIENT)
Dept: WOUND CARE | Age: 69
Discharge: HOME | End: 2019-04-11
Attending: SURGERY
Payer: MEDICARE

## 2019-01-01 ENCOUNTER — HOSPITAL ENCOUNTER (OUTPATIENT)
Dept: INFUSION THERAPY | Age: 69
Discharge: HOME | End: 2019-11-05
Payer: MEDICARE

## 2019-01-01 ENCOUNTER — HOSPITAL ENCOUNTER (OUTPATIENT)
Dept: LAB | Age: 69
Discharge: HOME | End: 2019-09-24
Payer: MEDICARE

## 2019-01-01 VITALS
TEMPERATURE: 98 F | OXYGEN SATURATION: 96 % | DIASTOLIC BLOOD PRESSURE: 68 MMHG | HEART RATE: 80 BPM | RESPIRATION RATE: 18 BRPM | SYSTOLIC BLOOD PRESSURE: 136 MMHG

## 2019-01-01 VITALS
TEMPERATURE: 97.9 F | OXYGEN SATURATION: 96 % | DIASTOLIC BLOOD PRESSURE: 59 MMHG | SYSTOLIC BLOOD PRESSURE: 117 MMHG | RESPIRATION RATE: 17 BRPM | HEART RATE: 77 BPM

## 2019-01-01 VITALS
HEART RATE: 75 BPM | SYSTOLIC BLOOD PRESSURE: 123 MMHG | RESPIRATION RATE: 17 BRPM | TEMPERATURE: 97.9 F | OXYGEN SATURATION: 96 % | DIASTOLIC BLOOD PRESSURE: 59 MMHG

## 2019-01-01 VITALS
HEART RATE: 79 BPM | SYSTOLIC BLOOD PRESSURE: 112 MMHG | BODY MASS INDEX: 37.36 KG/M2 | WEIGHT: 203 LBS | OXYGEN SATURATION: 93 % | RESPIRATION RATE: 20 BRPM | TEMPERATURE: 98.5 F | DIASTOLIC BLOOD PRESSURE: 80 MMHG | OXYGEN SATURATION: 94 % | HEIGHT: 62 IN

## 2019-01-01 VITALS
SYSTOLIC BLOOD PRESSURE: 119 MMHG | RESPIRATION RATE: 18 BRPM | DIASTOLIC BLOOD PRESSURE: 68 MMHG | HEART RATE: 72 BPM | OXYGEN SATURATION: 97 % | TEMPERATURE: 97.8 F

## 2019-01-01 VITALS
TEMPERATURE: 97.9 F | RESPIRATION RATE: 17 BRPM | SYSTOLIC BLOOD PRESSURE: 128 MMHG | HEART RATE: 78 BPM | OXYGEN SATURATION: 97 % | DIASTOLIC BLOOD PRESSURE: 68 MMHG

## 2019-01-01 VITALS
SYSTOLIC BLOOD PRESSURE: 98 MMHG | WEIGHT: 209 LBS | HEART RATE: 72 BPM | DIASTOLIC BLOOD PRESSURE: 71 MMHG | OXYGEN SATURATION: 92 % | BODY MASS INDEX: 38.23 KG/M2 | RESPIRATION RATE: 18 BRPM | TEMPERATURE: 98.2 F

## 2019-01-01 VITALS
HEART RATE: 70 BPM | SYSTOLIC BLOOD PRESSURE: 118 MMHG | RESPIRATION RATE: 17 BRPM | OXYGEN SATURATION: 97 % | DIASTOLIC BLOOD PRESSURE: 69 MMHG | TEMPERATURE: 97.9 F

## 2019-01-01 VITALS
DIASTOLIC BLOOD PRESSURE: 68 MMHG | OXYGEN SATURATION: 98 % | TEMPERATURE: 98 F | HEART RATE: 78 BPM | SYSTOLIC BLOOD PRESSURE: 118 MMHG | RESPIRATION RATE: 17 BRPM

## 2019-01-01 VITALS
TEMPERATURE: 98.2 F | SYSTOLIC BLOOD PRESSURE: 138 MMHG | DIASTOLIC BLOOD PRESSURE: 84 MMHG | RESPIRATION RATE: 18 BRPM | HEART RATE: 84 BPM | OXYGEN SATURATION: 97 %

## 2019-01-01 VITALS
DIASTOLIC BLOOD PRESSURE: 64 MMHG | BODY MASS INDEX: 36.95 KG/M2 | RESPIRATION RATE: 16 BRPM | TEMPERATURE: 98.3 F | SYSTOLIC BLOOD PRESSURE: 112 MMHG | WEIGHT: 202 LBS | HEART RATE: 67 BPM | OXYGEN SATURATION: 95 %

## 2019-01-01 VITALS
RESPIRATION RATE: 20 BRPM | DIASTOLIC BLOOD PRESSURE: 92 MMHG | HEART RATE: 71 BPM | SYSTOLIC BLOOD PRESSURE: 140 MMHG | TEMPERATURE: 97.9 F | OXYGEN SATURATION: 97 %

## 2019-01-01 VITALS
DIASTOLIC BLOOD PRESSURE: 74 MMHG | TEMPERATURE: 98 F | RESPIRATION RATE: 18 BRPM | HEART RATE: 80 BPM | OXYGEN SATURATION: 97 % | SYSTOLIC BLOOD PRESSURE: 138 MMHG

## 2019-01-01 VITALS
SYSTOLIC BLOOD PRESSURE: 128 MMHG | RESPIRATION RATE: 17 BRPM | TEMPERATURE: 97.8 F | RESPIRATION RATE: 18 BRPM | SYSTOLIC BLOOD PRESSURE: 122 MMHG | DIASTOLIC BLOOD PRESSURE: 62 MMHG | HEART RATE: 86 BPM | HEART RATE: 76 BPM | TEMPERATURE: 98.2 F | DIASTOLIC BLOOD PRESSURE: 69 MMHG | OXYGEN SATURATION: 97 %

## 2019-01-01 VITALS
SYSTOLIC BLOOD PRESSURE: 125 MMHG | DIASTOLIC BLOOD PRESSURE: 59 MMHG | RESPIRATION RATE: 18 BRPM | OXYGEN SATURATION: 98 % | TEMPERATURE: 97.7 F | HEART RATE: 76 BPM

## 2019-01-01 VITALS
RESPIRATION RATE: 20 BRPM | OXYGEN SATURATION: 93 % | TEMPERATURE: 99.7 F | SYSTOLIC BLOOD PRESSURE: 116 MMHG | BODY MASS INDEX: 37.98 KG/M2 | WEIGHT: 206.4 LBS | DIASTOLIC BLOOD PRESSURE: 64 MMHG | HEIGHT: 62 IN | HEART RATE: 81 BPM

## 2019-01-01 VITALS
DIASTOLIC BLOOD PRESSURE: 60 MMHG | RESPIRATION RATE: 18 BRPM | TEMPERATURE: 97.9 F | HEART RATE: 80 BPM | OXYGEN SATURATION: 98 % | SYSTOLIC BLOOD PRESSURE: 130 MMHG

## 2019-01-01 VITALS
HEART RATE: 62 BPM | SYSTOLIC BLOOD PRESSURE: 130 MMHG | OXYGEN SATURATION: 97 % | TEMPERATURE: 97.7 F | RESPIRATION RATE: 18 BRPM | DIASTOLIC BLOOD PRESSURE: 72 MMHG | DIASTOLIC BLOOD PRESSURE: 58 MMHG | SYSTOLIC BLOOD PRESSURE: 122 MMHG | HEART RATE: 72 BPM | RESPIRATION RATE: 17 BRPM | TEMPERATURE: 97 F | OXYGEN SATURATION: 96 %

## 2019-01-01 VITALS
HEART RATE: 75 BPM | TEMPERATURE: 97.6 F | DIASTOLIC BLOOD PRESSURE: 60 MMHG | SYSTOLIC BLOOD PRESSURE: 135 MMHG | OXYGEN SATURATION: 95 % | RESPIRATION RATE: 18 BRPM

## 2019-01-01 VITALS
DIASTOLIC BLOOD PRESSURE: 70 MMHG | SYSTOLIC BLOOD PRESSURE: 130 MMHG | RESPIRATION RATE: 16 BRPM | DIASTOLIC BLOOD PRESSURE: 86 MMHG | HEART RATE: 77 BPM | HEART RATE: 80 BPM | SYSTOLIC BLOOD PRESSURE: 138 MMHG | RESPIRATION RATE: 18 BRPM | TEMPERATURE: 97.8 F | OXYGEN SATURATION: 96 % | OXYGEN SATURATION: 94 % | TEMPERATURE: 98 F

## 2019-01-01 VITALS
DIASTOLIC BLOOD PRESSURE: 64 MMHG | SYSTOLIC BLOOD PRESSURE: 108 MMHG | RESPIRATION RATE: 18 BRPM | WEIGHT: 202.8 LBS | TEMPERATURE: 98.8 F | HEART RATE: 73 BPM | BODY MASS INDEX: 37.09 KG/M2 | OXYGEN SATURATION: 93 %

## 2019-01-01 VITALS
HEART RATE: 75 BPM | DIASTOLIC BLOOD PRESSURE: 60 MMHG | SYSTOLIC BLOOD PRESSURE: 135 MMHG | RESPIRATION RATE: 18 BRPM | TEMPERATURE: 97.6 F

## 2019-01-01 VITALS
HEART RATE: 71 BPM | TEMPERATURE: 98.3 F | SYSTOLIC BLOOD PRESSURE: 138 MMHG | DIASTOLIC BLOOD PRESSURE: 70 MMHG | OXYGEN SATURATION: 96 % | RESPIRATION RATE: 20 BRPM

## 2019-01-01 VITALS
DIASTOLIC BLOOD PRESSURE: 78 MMHG | RESPIRATION RATE: 18 BRPM | TEMPERATURE: 97.5 F | HEART RATE: 66 BPM | SYSTOLIC BLOOD PRESSURE: 118 MMHG

## 2019-01-01 VITALS
RESPIRATION RATE: 16 BRPM | SYSTOLIC BLOOD PRESSURE: 112 MMHG | HEART RATE: 68 BPM | WEIGHT: 207.6 LBS | DIASTOLIC BLOOD PRESSURE: 66 MMHG | TEMPERATURE: 98.5 F | OXYGEN SATURATION: 100 % | BODY MASS INDEX: 37.97 KG/M2

## 2019-01-01 VITALS
BODY MASS INDEX: 36.76 KG/M2 | SYSTOLIC BLOOD PRESSURE: 118 MMHG | RESPIRATION RATE: 18 BRPM | HEART RATE: 76 BPM | WEIGHT: 201 LBS | DIASTOLIC BLOOD PRESSURE: 68 MMHG | OXYGEN SATURATION: 96 % | TEMPERATURE: 98.5 F

## 2019-01-01 VITALS
OXYGEN SATURATION: 97 % | HEART RATE: 78 BPM | SYSTOLIC BLOOD PRESSURE: 118 MMHG | DIASTOLIC BLOOD PRESSURE: 62 MMHG | TEMPERATURE: 98 F | RESPIRATION RATE: 18 BRPM

## 2019-01-01 VITALS
TEMPERATURE: 97.8 F | DIASTOLIC BLOOD PRESSURE: 86 MMHG | RESPIRATION RATE: 18 BRPM | HEART RATE: 70 BPM | SYSTOLIC BLOOD PRESSURE: 132 MMHG

## 2019-01-01 VITALS
SYSTOLIC BLOOD PRESSURE: 116 MMHG | OXYGEN SATURATION: 97 % | HEART RATE: 78 BPM | DIASTOLIC BLOOD PRESSURE: 64 MMHG | TEMPERATURE: 97.9 F | RESPIRATION RATE: 18 BRPM

## 2019-01-01 VITALS
SYSTOLIC BLOOD PRESSURE: 120 MMHG | RESPIRATION RATE: 18 BRPM | DIASTOLIC BLOOD PRESSURE: 80 MMHG | OXYGEN SATURATION: 96 % | TEMPERATURE: 98.4 F | HEART RATE: 72 BPM

## 2019-01-01 VITALS
SYSTOLIC BLOOD PRESSURE: 159 MMHG | SYSTOLIC BLOOD PRESSURE: 122 MMHG | DIASTOLIC BLOOD PRESSURE: 72 MMHG | HEART RATE: 71 BPM | OXYGEN SATURATION: 96 % | DIASTOLIC BLOOD PRESSURE: 79 MMHG | TEMPERATURE: 98.6 F | HEART RATE: 74 BPM | RESPIRATION RATE: 17 BRPM | OXYGEN SATURATION: 97 % | RESPIRATION RATE: 16 BRPM | TEMPERATURE: 98.3 F

## 2019-01-01 VITALS
RESPIRATION RATE: 18 BRPM | SYSTOLIC BLOOD PRESSURE: 124 MMHG | DIASTOLIC BLOOD PRESSURE: 72 MMHG | HEART RATE: 68 BPM | OXYGEN SATURATION: 97 % | TEMPERATURE: 97.6 F

## 2019-01-01 VITALS
TEMPERATURE: 98.6 F | HEIGHT: 62 IN | RESPIRATION RATE: 20 BRPM | SYSTOLIC BLOOD PRESSURE: 132 MMHG | BODY MASS INDEX: 39.01 KG/M2 | DIASTOLIC BLOOD PRESSURE: 83 MMHG | OXYGEN SATURATION: 90 % | WEIGHT: 212 LBS | HEART RATE: 92 BPM

## 2019-01-01 VITALS
SYSTOLIC BLOOD PRESSURE: 112 MMHG | OXYGEN SATURATION: 93 % | BODY MASS INDEX: 38.16 KG/M2 | HEART RATE: 68 BPM | HEIGHT: 62 IN | WEIGHT: 207.4 LBS | DIASTOLIC BLOOD PRESSURE: 55 MMHG | RESPIRATION RATE: 20 BRPM | TEMPERATURE: 98.4 F

## 2019-01-01 VITALS
SYSTOLIC BLOOD PRESSURE: 122 MMHG | DIASTOLIC BLOOD PRESSURE: 64 MMHG | TEMPERATURE: 98 F | OXYGEN SATURATION: 97 % | RESPIRATION RATE: 17 BRPM | HEART RATE: 76 BPM

## 2019-01-01 VITALS
SYSTOLIC BLOOD PRESSURE: 121 MMHG | TEMPERATURE: 97.9 F | HEART RATE: 77 BPM | OXYGEN SATURATION: 97 % | RESPIRATION RATE: 17 BRPM | DIASTOLIC BLOOD PRESSURE: 69 MMHG

## 2019-01-01 VITALS
RESPIRATION RATE: 17 BRPM | HEART RATE: 77 BPM | DIASTOLIC BLOOD PRESSURE: 62 MMHG | TEMPERATURE: 97.9 F | OXYGEN SATURATION: 96 % | SYSTOLIC BLOOD PRESSURE: 122 MMHG

## 2019-01-01 VITALS
TEMPERATURE: 98.1 F | OXYGEN SATURATION: 98 % | SYSTOLIC BLOOD PRESSURE: 118 MMHG | RESPIRATION RATE: 18 BRPM | DIASTOLIC BLOOD PRESSURE: 58 MMHG | HEART RATE: 78 BPM

## 2019-01-01 VITALS
HEART RATE: 75 BPM | DIASTOLIC BLOOD PRESSURE: 65 MMHG | TEMPERATURE: 98.2 F | RESPIRATION RATE: 16 BRPM | WEIGHT: 206 LBS | BODY MASS INDEX: 37.68 KG/M2 | SYSTOLIC BLOOD PRESSURE: 122 MMHG | OXYGEN SATURATION: 91 %

## 2019-01-01 VITALS
RESPIRATION RATE: 17 BRPM | OXYGEN SATURATION: 96 % | HEART RATE: 78 BPM | TEMPERATURE: 98.1 F | DIASTOLIC BLOOD PRESSURE: 64 MMHG | TEMPERATURE: 98.1 F | HEART RATE: 67 BPM | DIASTOLIC BLOOD PRESSURE: 67 MMHG | SYSTOLIC BLOOD PRESSURE: 128 MMHG | RESPIRATION RATE: 17 BRPM | SYSTOLIC BLOOD PRESSURE: 116 MMHG | OXYGEN SATURATION: 97 %

## 2019-01-01 VITALS
OXYGEN SATURATION: 92 % | DIASTOLIC BLOOD PRESSURE: 82 MMHG | SYSTOLIC BLOOD PRESSURE: 118 MMHG | RESPIRATION RATE: 18 BRPM | HEART RATE: 70 BPM | TEMPERATURE: 98.1 F

## 2019-01-01 VITALS
BODY MASS INDEX: 37.94 KG/M2 | TEMPERATURE: 98.5 F | HEART RATE: 72 BPM | SYSTOLIC BLOOD PRESSURE: 123 MMHG | RESPIRATION RATE: 16 BRPM | WEIGHT: 206.2 LBS | OXYGEN SATURATION: 94 % | HEIGHT: 62 IN | DIASTOLIC BLOOD PRESSURE: 64 MMHG

## 2019-01-01 VITALS
TEMPERATURE: 98.2 F | SYSTOLIC BLOOD PRESSURE: 90 MMHG | RESPIRATION RATE: 16 BRPM | OXYGEN SATURATION: 96 % | HEART RATE: 78 BPM | DIASTOLIC BLOOD PRESSURE: 50 MMHG

## 2019-01-01 VITALS
RESPIRATION RATE: 16 BRPM | HEART RATE: 72 BPM | TEMPERATURE: 98.2 F | SYSTOLIC BLOOD PRESSURE: 104 MMHG | DIASTOLIC BLOOD PRESSURE: 66 MMHG | OXYGEN SATURATION: 97 %

## 2019-01-01 VITALS
TEMPERATURE: 97.4 F | SYSTOLIC BLOOD PRESSURE: 128 MMHG | OXYGEN SATURATION: 99 % | DIASTOLIC BLOOD PRESSURE: 60 MMHG | HEART RATE: 78 BPM | RESPIRATION RATE: 20 BRPM

## 2019-01-01 VITALS
TEMPERATURE: 98.7 F | HEART RATE: 70 BPM | WEIGHT: 211.6 LBS | DIASTOLIC BLOOD PRESSURE: 62 MMHG | SYSTOLIC BLOOD PRESSURE: 118 MMHG | OXYGEN SATURATION: 96 % | BODY MASS INDEX: 38.7 KG/M2 | RESPIRATION RATE: 16 BRPM

## 2019-01-01 VITALS
DIASTOLIC BLOOD PRESSURE: 85 MMHG | TEMPERATURE: 97.8 F | OXYGEN SATURATION: 96 % | RESPIRATION RATE: 16 BRPM | HEART RATE: 77 BPM | SYSTOLIC BLOOD PRESSURE: 130 MMHG

## 2019-01-01 VITALS
HEIGHT: 62 IN | BODY MASS INDEX: 39.05 KG/M2 | DIASTOLIC BLOOD PRESSURE: 72 MMHG | OXYGEN SATURATION: 97 % | SYSTOLIC BLOOD PRESSURE: 110 MMHG | WEIGHT: 212.2 LBS | HEART RATE: 72 BPM | RESPIRATION RATE: 17 BRPM | TEMPERATURE: 98.1 F

## 2019-01-01 VITALS
RESPIRATION RATE: 18 BRPM | DIASTOLIC BLOOD PRESSURE: 64 MMHG | TEMPERATURE: 97.6 F | OXYGEN SATURATION: 97 % | HEART RATE: 86 BPM | SYSTOLIC BLOOD PRESSURE: 118 MMHG

## 2019-01-01 VITALS
TEMPERATURE: 98 F | SYSTOLIC BLOOD PRESSURE: 124 MMHG | RESPIRATION RATE: 17 BRPM | HEART RATE: 76 BPM | DIASTOLIC BLOOD PRESSURE: 54 MMHG | OXYGEN SATURATION: 95 %

## 2019-01-01 VITALS
BODY MASS INDEX: 38.41 KG/M2 | OXYGEN SATURATION: 96 % | SYSTOLIC BLOOD PRESSURE: 122 MMHG | WEIGHT: 210 LBS | RESPIRATION RATE: 16 BRPM | HEART RATE: 70 BPM | DIASTOLIC BLOOD PRESSURE: 52 MMHG | TEMPERATURE: 98.2 F

## 2019-01-01 VITALS
OXYGEN SATURATION: 97 % | TEMPERATURE: 97.9 F | DIASTOLIC BLOOD PRESSURE: 64 MMHG | HEART RATE: 72 BPM | SYSTOLIC BLOOD PRESSURE: 118 MMHG | RESPIRATION RATE: 18 BRPM

## 2019-01-01 VITALS — RESPIRATION RATE: 20 BRPM | TEMPERATURE: 98.5 F | OXYGEN SATURATION: 94 % | HEART RATE: 69 BPM

## 2019-01-01 VITALS
OXYGEN SATURATION: 91 % | RESPIRATION RATE: 18 BRPM | TEMPERATURE: 98.5 F | DIASTOLIC BLOOD PRESSURE: 61 MMHG | SYSTOLIC BLOOD PRESSURE: 105 MMHG | HEART RATE: 79 BPM

## 2019-01-01 VITALS
SYSTOLIC BLOOD PRESSURE: 126 MMHG | OXYGEN SATURATION: 97 % | TEMPERATURE: 98.2 F | RESPIRATION RATE: 18 BRPM | DIASTOLIC BLOOD PRESSURE: 70 MMHG | HEART RATE: 72 BPM

## 2019-01-01 VITALS
OXYGEN SATURATION: 90 % | SYSTOLIC BLOOD PRESSURE: 132 MMHG | DIASTOLIC BLOOD PRESSURE: 59 MMHG | RESPIRATION RATE: 20 BRPM | TEMPERATURE: 98.2 F | HEART RATE: 73 BPM

## 2019-01-01 VITALS
HEART RATE: 66 BPM | SYSTOLIC BLOOD PRESSURE: 116 MMHG | TEMPERATURE: 98.6 F | OXYGEN SATURATION: 93 % | DIASTOLIC BLOOD PRESSURE: 63 MMHG | RESPIRATION RATE: 18 BRPM

## 2019-01-01 VITALS
DIASTOLIC BLOOD PRESSURE: 82 MMHG | SYSTOLIC BLOOD PRESSURE: 122 MMHG | OXYGEN SATURATION: 94 % | RESPIRATION RATE: 18 BRPM | HEART RATE: 64 BPM | TEMPERATURE: 98.1 F

## 2019-01-01 VITALS
OXYGEN SATURATION: 96 % | SYSTOLIC BLOOD PRESSURE: 111 MMHG | HEIGHT: 62 IN | WEIGHT: 187.6 LBS | HEART RATE: 70 BPM | DIASTOLIC BLOOD PRESSURE: 63 MMHG | TEMPERATURE: 97.9 F | RESPIRATION RATE: 18 BRPM | BODY MASS INDEX: 34.52 KG/M2

## 2019-01-01 VITALS
SYSTOLIC BLOOD PRESSURE: 112 MMHG | TEMPERATURE: 97.4 F | HEART RATE: 68 BPM | DIASTOLIC BLOOD PRESSURE: 62 MMHG | RESPIRATION RATE: 18 BRPM

## 2019-01-01 VITALS
SYSTOLIC BLOOD PRESSURE: 119 MMHG | HEART RATE: 78 BPM | TEMPERATURE: 97.9 F | OXYGEN SATURATION: 97 % | DIASTOLIC BLOOD PRESSURE: 68 MMHG | RESPIRATION RATE: 17 BRPM

## 2019-01-01 VITALS
DIASTOLIC BLOOD PRESSURE: 72 MMHG | SYSTOLIC BLOOD PRESSURE: 118 MMHG | RESPIRATION RATE: 16 BRPM | HEART RATE: 69 BPM | TEMPERATURE: 98.6 F

## 2019-01-01 VITALS
HEART RATE: 80 BPM | OXYGEN SATURATION: 96 % | SYSTOLIC BLOOD PRESSURE: 136 MMHG | TEMPERATURE: 98 F | RESPIRATION RATE: 18 BRPM | DIASTOLIC BLOOD PRESSURE: 68 MMHG

## 2019-01-01 VITALS
HEART RATE: 66 BPM | TEMPERATURE: 98.2 F | SYSTOLIC BLOOD PRESSURE: 146 MMHG | OXYGEN SATURATION: 92 % | RESPIRATION RATE: 18 BRPM | DIASTOLIC BLOOD PRESSURE: 80 MMHG

## 2019-01-01 VITALS
TEMPERATURE: 98.5 F | DIASTOLIC BLOOD PRESSURE: 68 MMHG | RESPIRATION RATE: 16 BRPM | SYSTOLIC BLOOD PRESSURE: 140 MMHG | BODY MASS INDEX: 37.57 KG/M2 | HEART RATE: 72 BPM | OXYGEN SATURATION: 96 % | WEIGHT: 205.4 LBS

## 2019-01-01 VITALS
DIASTOLIC BLOOD PRESSURE: 68 MMHG | OXYGEN SATURATION: 97 % | BODY MASS INDEX: 38.41 KG/M2 | SYSTOLIC BLOOD PRESSURE: 124 MMHG | TEMPERATURE: 98.4 F | RESPIRATION RATE: 18 BRPM | HEART RATE: 67 BPM | WEIGHT: 210 LBS

## 2019-01-01 VITALS
HEART RATE: 72 BPM | TEMPERATURE: 98 F | SYSTOLIC BLOOD PRESSURE: 118 MMHG | OXYGEN SATURATION: 96 % | DIASTOLIC BLOOD PRESSURE: 60 MMHG | RESPIRATION RATE: 18 BRPM

## 2019-01-01 VITALS
DIASTOLIC BLOOD PRESSURE: 62 MMHG | TEMPERATURE: 97.1 F | RESPIRATION RATE: 22 BRPM | SYSTOLIC BLOOD PRESSURE: 124 MMHG | OXYGEN SATURATION: 97 % | HEART RATE: 72 BPM

## 2019-01-01 VITALS
HEART RATE: 81 BPM | DIASTOLIC BLOOD PRESSURE: 60 MMHG | RESPIRATION RATE: 17 BRPM | TEMPERATURE: 97.9 F | SYSTOLIC BLOOD PRESSURE: 109 MMHG | OXYGEN SATURATION: 95 %

## 2019-01-01 VITALS
TEMPERATURE: 97.9 F | OXYGEN SATURATION: 96 % | DIASTOLIC BLOOD PRESSURE: 69 MMHG | HEART RATE: 65 BPM | SYSTOLIC BLOOD PRESSURE: 127 MMHG | RESPIRATION RATE: 17 BRPM

## 2019-01-01 VITALS — HEART RATE: 70 BPM | OXYGEN SATURATION: 90 %

## 2019-01-01 VITALS
WEIGHT: 198.4 LBS | HEART RATE: 66 BPM | RESPIRATION RATE: 20 BRPM | OXYGEN SATURATION: 96 % | SYSTOLIC BLOOD PRESSURE: 142 MMHG | BODY MASS INDEX: 36.29 KG/M2 | TEMPERATURE: 98.8 F | DIASTOLIC BLOOD PRESSURE: 80 MMHG

## 2019-01-01 VITALS
TEMPERATURE: 97.6 F | HEART RATE: 66 BPM | DIASTOLIC BLOOD PRESSURE: 62 MMHG | RESPIRATION RATE: 18 BRPM | SYSTOLIC BLOOD PRESSURE: 112 MMHG

## 2019-01-01 VITALS
TEMPERATURE: 97.9 F | HEART RATE: 71 BPM | RESPIRATION RATE: 20 BRPM | DIASTOLIC BLOOD PRESSURE: 75 MMHG | SYSTOLIC BLOOD PRESSURE: 118 MMHG

## 2019-01-01 VITALS
SYSTOLIC BLOOD PRESSURE: 142 MMHG | RESPIRATION RATE: 18 BRPM | HEART RATE: 72 BPM | OXYGEN SATURATION: 99 % | DIASTOLIC BLOOD PRESSURE: 68 MMHG | TEMPERATURE: 98.2 F

## 2019-01-01 VITALS
SYSTOLIC BLOOD PRESSURE: 130 MMHG | TEMPERATURE: 98 F | HEART RATE: 78 BPM | OXYGEN SATURATION: 97 % | RESPIRATION RATE: 18 BRPM | DIASTOLIC BLOOD PRESSURE: 79 MMHG

## 2019-01-01 VITALS
SYSTOLIC BLOOD PRESSURE: 128 MMHG | OXYGEN SATURATION: 98 % | HEART RATE: 76 BPM | RESPIRATION RATE: 18 BRPM | TEMPERATURE: 97.9 F | DIASTOLIC BLOOD PRESSURE: 76 MMHG

## 2019-01-01 VITALS
OXYGEN SATURATION: 97 % | HEART RATE: 73 BPM | DIASTOLIC BLOOD PRESSURE: 68 MMHG | RESPIRATION RATE: 17 BRPM | SYSTOLIC BLOOD PRESSURE: 117 MMHG | TEMPERATURE: 98.7 F

## 2019-01-01 VITALS
DIASTOLIC BLOOD PRESSURE: 68 MMHG | RESPIRATION RATE: 18 BRPM | OXYGEN SATURATION: 97 % | HEART RATE: 78 BPM | TEMPERATURE: 98.5 F | SYSTOLIC BLOOD PRESSURE: 116 MMHG

## 2019-01-01 VITALS
OXYGEN SATURATION: 98 % | SYSTOLIC BLOOD PRESSURE: 119 MMHG | DIASTOLIC BLOOD PRESSURE: 60 MMHG | BODY MASS INDEX: 38.85 KG/M2 | TEMPERATURE: 98.2 F | SYSTOLIC BLOOD PRESSURE: 134 MMHG | WEIGHT: 212.4 LBS | OXYGEN SATURATION: 97 % | TEMPERATURE: 97.9 F | HEART RATE: 76 BPM | HEART RATE: 67 BPM | DIASTOLIC BLOOD PRESSURE: 70 MMHG | RESPIRATION RATE: 20 BRPM | RESPIRATION RATE: 18 BRPM

## 2019-01-01 VITALS
SYSTOLIC BLOOD PRESSURE: 129 MMHG | OXYGEN SATURATION: 97 % | HEART RATE: 78 BPM | DIASTOLIC BLOOD PRESSURE: 72 MMHG | TEMPERATURE: 97.9 F | RESPIRATION RATE: 17 BRPM

## 2019-01-01 VITALS
HEART RATE: 80 BPM | SYSTOLIC BLOOD PRESSURE: 132 MMHG | OXYGEN SATURATION: 97 % | DIASTOLIC BLOOD PRESSURE: 82 MMHG | WEIGHT: 209 LBS | BODY MASS INDEX: 38.23 KG/M2 | TEMPERATURE: 98.4 F | RESPIRATION RATE: 16 BRPM

## 2019-01-01 VITALS
RESPIRATION RATE: 18 BRPM | HEART RATE: 68 BPM | SYSTOLIC BLOOD PRESSURE: 124 MMHG | DIASTOLIC BLOOD PRESSURE: 72 MMHG | TEMPERATURE: 97.6 F

## 2019-01-01 VITALS
TEMPERATURE: 97.9 F | SYSTOLIC BLOOD PRESSURE: 124 MMHG | BODY MASS INDEX: 38.5 KG/M2 | DIASTOLIC BLOOD PRESSURE: 72 MMHG | WEIGHT: 210.5 LBS | OXYGEN SATURATION: 94 % | RESPIRATION RATE: 18 BRPM | HEART RATE: 67 BPM

## 2019-01-01 DIAGNOSIS — L98.9 SKIN DEFECT: ICD-10-CM

## 2019-01-01 DIAGNOSIS — R53.81 DEBILITY: ICD-10-CM

## 2019-01-01 DIAGNOSIS — E87.5 ACUTE HYPERKALEMIA: ICD-10-CM

## 2019-01-01 DIAGNOSIS — I50.43 ACUTE ON CHRONIC COMBINED SYSTOLIC AND DIASTOLIC CONGESTIVE HEART FAILURE (HCC): ICD-10-CM

## 2019-01-01 DIAGNOSIS — N18.9 ACUTE RENAL FAILURE SUPERIMPOSED ON CHRONIC KIDNEY DISEASE, UNSPECIFIED CKD STAGE, UNSPECIFIED ACUTE RENAL FAILURE TYPE (HCC): ICD-10-CM

## 2019-01-01 DIAGNOSIS — E11.21 TYPE 2 DIABETES MELLITUS WITH NEPHROPATHY (HCC): Chronic | ICD-10-CM

## 2019-01-01 DIAGNOSIS — Z79.01 ANTICOAGULATED ON COUMADIN: Chronic | ICD-10-CM

## 2019-01-01 DIAGNOSIS — Z95.2 S/P AVR (AORTIC VALVE REPLACEMENT): Primary | ICD-10-CM

## 2019-01-01 DIAGNOSIS — M25.511 CHRONIC RIGHT SHOULDER PAIN: ICD-10-CM

## 2019-01-01 DIAGNOSIS — J42 CHRONIC BRONCHITIS, UNSPECIFIED CHRONIC BRONCHITIS TYPE (HCC): Chronic | ICD-10-CM

## 2019-01-01 DIAGNOSIS — D50.0 IRON DEFICIENCY ANEMIA DUE TO CHRONIC BLOOD LOSS: ICD-10-CM

## 2019-01-01 DIAGNOSIS — Z71.89 ADVANCED CARE PLANNING/COUNSELING DISCUSSION: ICD-10-CM

## 2019-01-01 DIAGNOSIS — R06.03 ACUTE RESPIRATORY DISTRESS: ICD-10-CM

## 2019-01-01 DIAGNOSIS — D46.9 MDS (MYELODYSPLASTIC SYNDROME) (HCC): Primary | ICD-10-CM

## 2019-01-01 DIAGNOSIS — N18.30 ANEMIA OF CHRONIC KIDNEY FAILURE, STAGE 3 (MODERATE) (HCC): ICD-10-CM

## 2019-01-01 DIAGNOSIS — D46.9 MDS (MYELODYSPLASTIC SYNDROME) (HCC): ICD-10-CM

## 2019-01-01 DIAGNOSIS — J96.22 ACUTE ON CHRONIC RESPIRATORY FAILURE WITH HYPOXIA AND HYPERCAPNIA (HCC): Primary | ICD-10-CM

## 2019-01-01 DIAGNOSIS — K92.2 GASTROINTESTINAL HEMORRHAGE, UNSPECIFIED GASTROINTESTINAL HEMORRHAGE TYPE: Primary | ICD-10-CM

## 2019-01-01 DIAGNOSIS — N17.9 ACUTE RENAL FAILURE SUPERIMPOSED ON CHRONIC KIDNEY DISEASE, UNSPECIFIED CKD STAGE, UNSPECIFIED ACUTE RENAL FAILURE TYPE (HCC): ICD-10-CM

## 2019-01-01 DIAGNOSIS — Z95.2 S/P AVR (AORTIC VALVE REPLACEMENT): ICD-10-CM

## 2019-01-01 DIAGNOSIS — L92.9 EXCESSIVE GRANULATION TISSUE: ICD-10-CM

## 2019-01-01 DIAGNOSIS — M19.019 OSTEOARTHRITIS OF SHOULDER, UNSPECIFIED LATERALITY, UNSPECIFIED OSTEOARTHRITIS TYPE: ICD-10-CM

## 2019-01-01 DIAGNOSIS — Z51.5 ENCOUNTER FOR PALLIATIVE CARE: ICD-10-CM

## 2019-01-01 DIAGNOSIS — R79.89 ELEVATED LFTS: ICD-10-CM

## 2019-01-01 DIAGNOSIS — R53.83 OTHER FATIGUE: ICD-10-CM

## 2019-01-01 DIAGNOSIS — N18.4 CKD (CHRONIC KIDNEY DISEASE) STAGE 4, GFR 15-29 ML/MIN (HCC): ICD-10-CM

## 2019-01-01 DIAGNOSIS — J96.21 ACUTE ON CHRONIC RESPIRATORY FAILURE WITH HYPOXIA (HCC): ICD-10-CM

## 2019-01-01 DIAGNOSIS — N17.9 ACUTE KIDNEY INJURY (HCC): ICD-10-CM

## 2019-01-01 DIAGNOSIS — R53.81 PHYSICAL DEBILITY: Chronic | ICD-10-CM

## 2019-01-01 DIAGNOSIS — J96.21 ACUTE ON CHRONIC RESPIRATORY FAILURE WITH HYPOXIA AND HYPERCAPNIA (HCC): Primary | ICD-10-CM

## 2019-01-01 DIAGNOSIS — D64.9 ANEMIA, UNSPECIFIED TYPE: ICD-10-CM

## 2019-01-01 DIAGNOSIS — G89.29 CHRONIC RIGHT SHOULDER PAIN: ICD-10-CM

## 2019-01-01 DIAGNOSIS — I50.23 ACUTE ON CHRONIC SYSTOLIC CHF (CONGESTIVE HEART FAILURE) (HCC): ICD-10-CM

## 2019-01-01 DIAGNOSIS — J96.11 CHRONIC RESPIRATORY FAILURE WITH HYPOXIA (HCC): ICD-10-CM

## 2019-01-01 DIAGNOSIS — D63.1 ANEMIA OF CHRONIC KIDNEY FAILURE, STAGE 3 (MODERATE) (HCC): ICD-10-CM

## 2019-01-01 DIAGNOSIS — E66.01 SEVERE OBESITY (HCC): ICD-10-CM

## 2019-01-01 DIAGNOSIS — R60.0 BILATERAL LEG EDEMA: ICD-10-CM

## 2019-01-01 DIAGNOSIS — I50.9 ACUTE ON CHRONIC CONGESTIVE HEART FAILURE, UNSPECIFIED HEART FAILURE TYPE (HCC): ICD-10-CM

## 2019-01-01 DIAGNOSIS — G47.33 OSA (OBSTRUCTIVE SLEEP APNEA): Chronic | ICD-10-CM

## 2019-01-01 DIAGNOSIS — D46.9 MDS (MYELODYSPLASTIC SYNDROME) (HCC): Chronic | ICD-10-CM

## 2019-01-01 DIAGNOSIS — R55 SYNCOPE AND COLLAPSE: Primary | ICD-10-CM

## 2019-01-01 DIAGNOSIS — R06.02 SHORTNESS OF BREATH: ICD-10-CM

## 2019-01-01 LAB
ABO + RH BLD: NORMAL
ALBUMIN SERPL-MCNC: 2.8 G/DL (ref 3.2–4.6)
ALBUMIN SERPL-MCNC: 2.9 G/DL (ref 3.2–4.6)
ALBUMIN SERPL-MCNC: 3 G/DL (ref 3.2–4.6)
ALBUMIN SERPL-MCNC: 3.1 G/DL (ref 3.2–4.6)
ALBUMIN SERPL-MCNC: 3.1 G/DL (ref 3.2–4.6)
ALBUMIN SERPL-MCNC: 3.2 G/DL (ref 3.2–4.6)
ALBUMIN SERPL-MCNC: 3.3 G/DL (ref 3.2–4.6)
ALBUMIN SERPL-MCNC: 3.4 G/DL (ref 3.2–4.6)
ALBUMIN SERPL-MCNC: 3.4 G/DL (ref 3.2–4.6)
ALBUMIN SERPL-MCNC: 3.5 G/DL (ref 3.2–4.6)
ALBUMIN SERPL-MCNC: 3.5 G/DL (ref 3.2–4.6)
ALBUMIN/GLOB SERPL: 0.8 {RATIO} (ref 1.2–3.5)
ALBUMIN/GLOB SERPL: 0.9 {RATIO} (ref 1.2–3.5)
ALP SERPL-CCNC: 53 U/L (ref 50–136)
ALP SERPL-CCNC: 56 U/L (ref 50–136)
ALP SERPL-CCNC: 61 U/L (ref 50–136)
ALP SERPL-CCNC: 63 U/L (ref 50–136)
ALP SERPL-CCNC: 66 U/L (ref 50–136)
ALP SERPL-CCNC: 66 U/L (ref 50–136)
ALP SERPL-CCNC: 68 U/L (ref 50–136)
ALP SERPL-CCNC: 71 U/L (ref 50–136)
ALP SERPL-CCNC: 72 U/L (ref 50–136)
ALP SERPL-CCNC: 72 U/L (ref 50–136)
ALP SERPL-CCNC: 76 U/L (ref 50–136)
ALP SERPL-CCNC: 76 U/L (ref 50–136)
ALP SERPL-CCNC: 82 U/L (ref 50–136)
ALT SERPL-CCNC: 10 U/L (ref 12–65)
ALT SERPL-CCNC: 1007 U/L (ref 12–65)
ALT SERPL-CCNC: 1153 U/L (ref 12–65)
ALT SERPL-CCNC: 15 U/L (ref 12–65)
ALT SERPL-CCNC: 19 U/L (ref 12–65)
ALT SERPL-CCNC: 20 U/L (ref 12–65)
ALT SERPL-CCNC: 24 U/L (ref 12–65)
ALT SERPL-CCNC: 334 U/L (ref 12–65)
ALT SERPL-CCNC: 436 U/L (ref 12–65)
ALT SERPL-CCNC: 532 U/L (ref 12–65)
ALT SERPL-CCNC: 618 U/L (ref 12–65)
AMORPH CRY URNS QL MICRO: ABNORMAL
ANION GAP SERPL CALC-SCNC: 1 MMOL/L (ref 7–16)
ANION GAP SERPL CALC-SCNC: 2 MMOL/L (ref 7–16)
ANION GAP SERPL CALC-SCNC: 3 MMOL/L (ref 7–16)
ANION GAP SERPL CALC-SCNC: 4 MMOL/L (ref 7–16)
ANION GAP SERPL CALC-SCNC: 5 MMOL/L (ref 7–16)
ANION GAP SERPL CALC-SCNC: 6 MMOL/L (ref 7–16)
ANION GAP SERPL CALC-SCNC: 7 MMOL/L (ref 7–16)
ANION GAP SERPL CALC-SCNC: 8 MMOL/L (ref 7–16)
ANION GAP SERPL CALC-SCNC: 9 MMOL/L (ref 7–16)
ANION GAP SERPL CALC-SCNC: ABNORMAL MMOL/L (ref 7–16)
APPEARANCE UR: ABNORMAL
APPEARANCE UR: CLEAR
APTT PPP: 100 SEC (ref 24.7–39.8)
APTT PPP: 102.4 SEC (ref 24.7–39.8)
APTT PPP: 103.7 SEC (ref 24.7–39.8)
APTT PPP: 110.1 SEC (ref 24.7–39.8)
APTT PPP: 110.4 SEC (ref 24.7–39.8)
APTT PPP: 111.5 SEC (ref 24.7–39.8)
APTT PPP: 121.1 SEC (ref 24.7–39.8)
APTT PPP: 123.4 SEC (ref 24.7–39.8)
APTT PPP: 124.5 SEC (ref 24.7–39.8)
APTT PPP: 147.1 SEC (ref 24.7–39.8)
APTT PPP: 149.9 SEC (ref 24.7–39.8)
APTT PPP: 153.3 SEC (ref 24.7–39.8)
APTT PPP: 164.9 SEC (ref 24.7–39.8)
APTT PPP: 168.5 SEC (ref 24.7–39.8)
APTT PPP: 169.2 SEC (ref 24.7–39.8)
APTT PPP: 177.3 SEC (ref 24.7–39.8)
APTT PPP: 187.8 SEC (ref 24.7–39.8)
APTT PPP: 198.7 SEC (ref 24.7–39.8)
APTT PPP: 36.2 SEC (ref 24.7–39.8)
APTT PPP: 49.9 SEC (ref 24.7–39.8)
APTT PPP: 55.2 SEC (ref 24.7–39.8)
APTT PPP: 59.9 SEC (ref 24.7–39.8)
APTT PPP: 60 SEC (ref 24.7–39.8)
APTT PPP: 60 SEC (ref 24.7–39.8)
APTT PPP: 61.7 SEC (ref 24.7–39.8)
APTT PPP: 63.7 SEC (ref 24.7–39.8)
APTT PPP: 64.5 SEC (ref 24.7–39.8)
APTT PPP: 68.8 SEC (ref 24.7–39.8)
APTT PPP: 74.7 SEC (ref 24.7–39.8)
APTT PPP: 78 SEC (ref 24.7–39.8)
APTT PPP: 79.2 SEC (ref 24.7–39.8)
APTT PPP: 79.6 SEC (ref 24.7–39.8)
APTT PPP: 81.7 SEC (ref 24.7–39.8)
APTT PPP: 89.1 SEC (ref 24.7–39.8)
APTT PPP: >200 SEC (ref 24.7–39.8)
ARTERIAL PATENCY WRIST A: YES
AST SERPL-CCNC: 10 U/L (ref 15–37)
AST SERPL-CCNC: 1075 U/L (ref 15–37)
AST SERPL-CCNC: 12 U/L (ref 15–37)
AST SERPL-CCNC: 12 U/L (ref 15–37)
AST SERPL-CCNC: 120 U/L (ref 15–37)
AST SERPL-CCNC: 13 U/L (ref 15–37)
AST SERPL-CCNC: 1487 U/L (ref 15–37)
AST SERPL-CCNC: 15 U/L (ref 15–37)
AST SERPL-CCNC: 18 U/L (ref 15–37)
AST SERPL-CCNC: 266 U/L (ref 15–37)
AST SERPL-CCNC: 39 U/L (ref 15–37)
AST SERPL-CCNC: 64 U/L (ref 15–37)
AST SERPL-CCNC: 835 U/L (ref 15–37)
ATRIAL RATE: 67 BPM
ATRIAL RATE: 77 BPM
ATRIAL RATE: 78 BPM
ATRIAL RATE: 80 BPM
ATRIAL RATE: 83 BPM
BACTERIA SPEC CULT: NORMAL
BACTERIA SPEC CULT: NORMAL
BACTERIA URNS QL MICRO: ABNORMAL /HPF
BASE EXCESS BLD CALC-SCNC: 12 MMOL/L
BASE EXCESS BLD CALC-SCNC: 13 MMOL/L
BASE EXCESS BLD CALC-SCNC: 14 MMOL/L
BASE EXCESS BLD CALC-SCNC: 15 MMOL/L
BASE EXCESS BLD CALC-SCNC: 15 MMOL/L
BASE EXCESS BLD CALC-SCNC: 16 MMOL/L
BASE EXCESS BLD CALC-SCNC: 18 MMOL/L
BASE EXCESS BLD CALC-SCNC: 18 MMOL/L
BASE EXCESS BLD CALC-SCNC: 21 MMOL/L
BASE EXCESS BLD CALC-SCNC: 9 MMOL/L
BASOPHILS # BLD: 0 K/UL (ref 0–0.2)
BASOPHILS # BLD: 0.1 K/UL (ref 0–0.2)
BASOPHILS NFR BLD: 0 % (ref 0–2)
BASOPHILS NFR BLD: 1 % (ref 0–2)
BDY SITE: ABNORMAL
BILIRUB DIRECT SERPL-MCNC: 0.6 MG/DL
BILIRUB SERPL-MCNC: 0.4 MG/DL (ref 0.2–1.1)
BILIRUB SERPL-MCNC: 0.6 MG/DL (ref 0.2–1.1)
BILIRUB SERPL-MCNC: 0.7 MG/DL (ref 0.2–1.1)
BILIRUB SERPL-MCNC: 0.7 MG/DL (ref 0.2–1.1)
BILIRUB SERPL-MCNC: 0.8 MG/DL (ref 0.2–1.1)
BILIRUB SERPL-MCNC: 0.9 MG/DL (ref 0.2–1.1)
BILIRUB SERPL-MCNC: 0.9 MG/DL (ref 0.2–1.1)
BILIRUB SERPL-MCNC: 1 MG/DL (ref 0.2–1.1)
BILIRUB SERPL-MCNC: 1.1 MG/DL (ref 0.2–1.1)
BILIRUB SERPL-MCNC: 1.1 MG/DL (ref 0.2–1.1)
BILIRUB SERPL-MCNC: 1.3 MG/DL (ref 0.2–1.1)
BILIRUB UR QL: NEGATIVE
BILIRUB UR QL: NEGATIVE
BLD PROD TYP BPU: NORMAL
BLOOD GROUP ANTIBODIES SERPL: NORMAL
BNP SERPL-MCNC: 1313 PG/ML
BNP SERPL-MCNC: 1354 PG/ML
BNP SERPL-MCNC: 1406 PG/ML
BNP SERPL-MCNC: 1822 PG/ML
BNP SERPL-MCNC: 403 PG/ML
BNP SERPL-MCNC: 895 PG/ML
BODY TEMPERATURE: 98.6
BPU ID: NORMAL
BUN SERPL-MCNC: 112 MG/DL (ref 8–23)
BUN SERPL-MCNC: 116 MG/DL (ref 8–23)
BUN SERPL-MCNC: 22 MG/DL (ref 8–23)
BUN SERPL-MCNC: 37 MG/DL (ref 8–23)
BUN SERPL-MCNC: 41 MG/DL (ref 8–23)
BUN SERPL-MCNC: 46 MG/DL (ref 8–23)
BUN SERPL-MCNC: 47 MG/DL (ref 8–23)
BUN SERPL-MCNC: 47 MG/DL (ref 8–23)
BUN SERPL-MCNC: 48 MG/DL (ref 8–23)
BUN SERPL-MCNC: 49 MG/DL (ref 8–23)
BUN SERPL-MCNC: 50 MG/DL (ref 8–23)
BUN SERPL-MCNC: 50 MG/DL (ref 8–23)
BUN SERPL-MCNC: 51 MG/DL (ref 8–23)
BUN SERPL-MCNC: 52 MG/DL (ref 8–23)
BUN SERPL-MCNC: 53 MG/DL (ref 8–23)
BUN SERPL-MCNC: 54 MG/DL (ref 8–23)
BUN SERPL-MCNC: 54 MG/DL (ref 8–23)
BUN SERPL-MCNC: 55 MG/DL (ref 8–23)
BUN SERPL-MCNC: 55 MG/DL (ref 8–23)
BUN SERPL-MCNC: 56 MG/DL (ref 8–23)
BUN SERPL-MCNC: 56 MG/DL (ref 8–23)
BUN SERPL-MCNC: 58 MG/DL (ref 8–23)
BUN SERPL-MCNC: 58 MG/DL (ref 8–23)
BUN SERPL-MCNC: 61 MG/DL (ref 8–23)
BUN SERPL-MCNC: 63 MG/DL (ref 8–23)
BUN SERPL-MCNC: 65 MG/DL (ref 8–23)
BUN SERPL-MCNC: 65 MG/DL (ref 8–23)
BUN SERPL-MCNC: 66 MG/DL (ref 8–23)
BUN SERPL-MCNC: 69 MG/DL (ref 8–23)
BUN SERPL-MCNC: 74 MG/DL (ref 8–23)
BUN SERPL-MCNC: 74 MG/DL (ref 8–23)
BUN SERPL-MCNC: 76 MG/DL (ref 8–23)
BUN SERPL-MCNC: 77 MG/DL (ref 8–23)
BUN SERPL-MCNC: 89 MG/DL (ref 8–23)
CA-I BLD-MCNC: 1.11 MMOL/L (ref 1.12–1.32)
CALCIUM SERPL-MCNC: 10 MG/DL (ref 8.3–10.4)
CALCIUM SERPL-MCNC: 10.1 MG/DL (ref 8.3–10.4)
CALCIUM SERPL-MCNC: 10.3 MG/DL (ref 8.3–10.4)
CALCIUM SERPL-MCNC: 8.7 MG/DL (ref 8.3–10.4)
CALCIUM SERPL-MCNC: 8.7 MG/DL (ref 8.3–10.4)
CALCIUM SERPL-MCNC: 8.9 MG/DL (ref 8.3–10.4)
CALCIUM SERPL-MCNC: 9 MG/DL (ref 8.3–10.4)
CALCIUM SERPL-MCNC: 9.1 MG/DL (ref 8.3–10.4)
CALCIUM SERPL-MCNC: 9.1 MG/DL (ref 8.3–10.4)
CALCIUM SERPL-MCNC: 9.2 MG/DL (ref 8.3–10.4)
CALCIUM SERPL-MCNC: 9.2 MG/DL (ref 8.3–10.4)
CALCIUM SERPL-MCNC: 9.3 MG/DL (ref 8.3–10.4)
CALCIUM SERPL-MCNC: 9.4 MG/DL (ref 8.3–10.4)
CALCIUM SERPL-MCNC: 9.5 MG/DL (ref 8.3–10.4)
CALCIUM SERPL-MCNC: 9.6 MG/DL (ref 8.3–10.4)
CALCIUM SERPL-MCNC: 9.7 MG/DL (ref 8.3–10.4)
CALCIUM SERPL-MCNC: 9.8 MG/DL (ref 8.3–10.4)
CALCIUM SERPL-MCNC: 9.9 MG/DL (ref 8.3–10.4)
CALCIUM SERPL-MCNC: 9.9 MG/DL (ref 8.3–10.4)
CALCULATED P AXIS, ECG09: 18 DEGREES
CALCULATED P AXIS, ECG09: 36 DEGREES
CALCULATED P AXIS, ECG09: 36 DEGREES
CALCULATED P AXIS, ECG09: 66 DEGREES
CALCULATED P AXIS, ECG09: 83 DEGREES
CALCULATED R AXIS, ECG10: -45 DEGREES
CALCULATED R AXIS, ECG10: -47 DEGREES
CALCULATED R AXIS, ECG10: -50 DEGREES
CALCULATED R AXIS, ECG10: -52 DEGREES
CALCULATED R AXIS, ECG10: -53 DEGREES
CALCULATED T AXIS, ECG11: 108 DEGREES
CALCULATED T AXIS, ECG11: 110 DEGREES
CALCULATED T AXIS, ECG11: 118 DEGREES
CALCULATED T AXIS, ECG11: 120 DEGREES
CALCULATED T AXIS, ECG11: 128 DEGREES
CASTS URNS QL MICRO: ABNORMAL /LPF
CHLORIDE SERPL-SCNC: 100 MMOL/L (ref 98–107)
CHLORIDE SERPL-SCNC: 100 MMOL/L (ref 98–107)
CHLORIDE SERPL-SCNC: 101 MMOL/L (ref 98–107)
CHLORIDE SERPL-SCNC: 101 MMOL/L (ref 98–107)
CHLORIDE SERPL-SCNC: 102 MMOL/L (ref 98–107)
CHLORIDE SERPL-SCNC: 104 MMOL/L (ref 98–107)
CHLORIDE SERPL-SCNC: 106 MMOL/L (ref 98–107)
CHLORIDE SERPL-SCNC: 108 MMOL/L (ref 98–107)
CHLORIDE SERPL-SCNC: 109 MMOL/L (ref 98–107)
CHLORIDE SERPL-SCNC: 110 MMOL/L (ref 98–107)
CHLORIDE SERPL-SCNC: 114 MMOL/L (ref 98–107)
CHLORIDE SERPL-SCNC: 94 MMOL/L (ref 98–107)
CHLORIDE SERPL-SCNC: 94 MMOL/L (ref 98–107)
CHLORIDE SERPL-SCNC: 95 MMOL/L (ref 98–107)
CHLORIDE SERPL-SCNC: 96 MMOL/L (ref 98–107)
CHLORIDE SERPL-SCNC: 97 MMOL/L (ref 98–107)
CHLORIDE SERPL-SCNC: 98 MMOL/L (ref 98–107)
CHLORIDE SERPL-SCNC: 98 MMOL/L (ref 98–107)
CHLORIDE SERPL-SCNC: 99 MMOL/L (ref 98–107)
CK SERPL-CCNC: 29 U/L (ref 21–215)
CO2 BLD-SCNC: 39 MMOL/L
CO2 BLD-SCNC: 40 MMOL/L
CO2 BLD-SCNC: 40 MMOL/L
CO2 BLD-SCNC: 41 MMOL/L
CO2 BLD-SCNC: 42 MMOL/L
CO2 BLD-SCNC: 42 MMOL/L
CO2 BLD-SCNC: 43 MMOL/L
CO2 BLD-SCNC: 43 MMOL/L
CO2 BLD-SCNC: 44 MMOL/L
CO2 BLD-SCNC: 47 MMOL/L
CO2 BLD-SCNC: >50 MMOL/L
CO2 SERPL-SCNC: 26 MMOL/L (ref 21–32)
CO2 SERPL-SCNC: 28 MMOL/L (ref 21–32)
CO2 SERPL-SCNC: 29 MMOL/L (ref 21–32)
CO2 SERPL-SCNC: 30 MMOL/L (ref 21–32)
CO2 SERPL-SCNC: 30 MMOL/L (ref 21–32)
CO2 SERPL-SCNC: 32 MMOL/L (ref 21–32)
CO2 SERPL-SCNC: 33 MMOL/L (ref 21–32)
CO2 SERPL-SCNC: 33 MMOL/L (ref 21–32)
CO2 SERPL-SCNC: 34 MMOL/L (ref 21–32)
CO2 SERPL-SCNC: 35 MMOL/L (ref 21–32)
CO2 SERPL-SCNC: 36 MMOL/L (ref 21–32)
CO2 SERPL-SCNC: 37 MMOL/L (ref 21–32)
CO2 SERPL-SCNC: 38 MMOL/L (ref 21–32)
CO2 SERPL-SCNC: 39 MMOL/L (ref 21–32)
CO2 SERPL-SCNC: 40 MMOL/L (ref 21–32)
CO2 SERPL-SCNC: 43 MMOL/L (ref 21–32)
CO2 SERPL-SCNC: 44 MMOL/L (ref 21–32)
CO2 SERPL-SCNC: >45 MMOL/L (ref 21–32)
COLLECT TIME,HTIME: 1052
COLLECT TIME,HTIME: 1515
COLLECT TIME,HTIME: 1834
COLLECT TIME,HTIME: 1935
COLLECT TIME,HTIME: 310
COLLECT TIME,HTIME: 326
COLLECT TIME,HTIME: 405
COLLECT TIME,HTIME: 413
COLLECT TIME,HTIME: 420
COLLECT TIME,HTIME: 525
COLLECT TIME,HTIME: 718
COLLECT TIME,HTIME: 956
COLOR UR: YELLOW
COLOR UR: YELLOW
CREAT SERPL-MCNC: 1.25 MG/DL (ref 0.6–1)
CREAT SERPL-MCNC: 1.5 MG/DL (ref 0.6–1)
CREAT SERPL-MCNC: 1.56 MG/DL (ref 0.6–1)
CREAT SERPL-MCNC: 1.67 MG/DL (ref 0.6–1)
CREAT SERPL-MCNC: 1.73 MG/DL (ref 0.6–1)
CREAT SERPL-MCNC: 1.79 MG/DL (ref 0.6–1)
CREAT SERPL-MCNC: 1.86 MG/DL (ref 0.6–1)
CREAT SERPL-MCNC: 1.89 MG/DL (ref 0.6–1)
CREAT SERPL-MCNC: 1.91 MG/DL (ref 0.6–1)
CREAT SERPL-MCNC: 1.92 MG/DL (ref 0.6–1)
CREAT SERPL-MCNC: 1.95 MG/DL (ref 0.6–1)
CREAT SERPL-MCNC: 1.95 MG/DL (ref 0.6–1)
CREAT SERPL-MCNC: 1.96 MG/DL (ref 0.6–1)
CREAT SERPL-MCNC: 2 MG/DL (ref 0.6–1)
CREAT SERPL-MCNC: 2.03 MG/DL (ref 0.6–1)
CREAT SERPL-MCNC: 2.07 MG/DL (ref 0.6–1)
CREAT SERPL-MCNC: 2.07 MG/DL (ref 0.6–1)
CREAT SERPL-MCNC: 2.08 MG/DL (ref 0.6–1)
CREAT SERPL-MCNC: 2.08 MG/DL (ref 0.6–1)
CREAT SERPL-MCNC: 2.11 MG/DL (ref 0.6–1)
CREAT SERPL-MCNC: 2.14 MG/DL (ref 0.6–1)
CREAT SERPL-MCNC: 2.15 MG/DL (ref 0.6–1)
CREAT SERPL-MCNC: 2.16 MG/DL (ref 0.6–1)
CREAT SERPL-MCNC: 2.18 MG/DL (ref 0.6–1)
CREAT SERPL-MCNC: 2.19 MG/DL (ref 0.6–1)
CREAT SERPL-MCNC: 2.2 MG/DL (ref 0.6–1)
CREAT SERPL-MCNC: 2.21 MG/DL (ref 0.6–1)
CREAT SERPL-MCNC: 2.24 MG/DL (ref 0.6–1)
CREAT SERPL-MCNC: 2.28 MG/DL (ref 0.6–1)
CREAT SERPL-MCNC: 2.28 MG/DL (ref 0.6–1)
CREAT SERPL-MCNC: 2.29 MG/DL (ref 0.6–1)
CREAT SERPL-MCNC: 2.29 MG/DL (ref 0.6–1)
CREAT SERPL-MCNC: 2.3 MG/DL (ref 0.6–1)
CREAT SERPL-MCNC: 2.46 MG/DL (ref 0.6–1)
CREAT SERPL-MCNC: 2.67 MG/DL (ref 0.6–1)
CREAT SERPL-MCNC: 2.71 MG/DL (ref 0.6–1)
CREAT SERPL-MCNC: 2.87 MG/DL (ref 0.6–1)
CREAT SERPL-MCNC: 2.87 MG/DL (ref 0.6–1)
CREAT SERPL-MCNC: 3.01 MG/DL (ref 0.6–1)
CREAT SERPL-MCNC: 3.14 MG/DL (ref 0.6–1)
CREAT SERPL-MCNC: 3.35 MG/DL (ref 0.6–1)
CREAT SERPL-MCNC: 3.71 MG/DL (ref 0.6–1)
CREAT UR-MCNC: 72.9 MG/DL
CROSSMATCH RESULT,%XM: NORMAL
DIAGNOSIS, 93000: NORMAL
DIFFERENTIAL METHOD BLD: ABNORMAL
DIFFERENTIAL METHOD BLD: NORMAL
EOSINOPHIL # BLD: 0 K/UL (ref 0–0.8)
EOSINOPHIL # BLD: 0.1 K/UL (ref 0–0.8)
EOSINOPHIL # BLD: 0.2 K/UL (ref 0–0.8)
EOSINOPHIL # BLD: 0.3 K/UL (ref 0–0.8)
EOSINOPHIL # BLD: 0.3 K/UL (ref 0–0.8)
EOSINOPHIL NFR BLD: 0 % (ref 0.5–7.8)
EOSINOPHIL NFR BLD: 1 % (ref 0.5–7.8)
EOSINOPHIL NFR BLD: 2 % (ref 0.5–7.8)
EOSINOPHIL NFR BLD: 3 % (ref 0.5–7.8)
EOSINOPHIL NFR BLD: 4 % (ref 0.5–7.8)
EOSINOPHIL NFR BLD: 5 % (ref 0.5–7.8)
EPI CELLS #/AREA URNS HPF: ABNORMAL /HPF
ERYTHROCYTE [DISTWIDTH] IN BLOOD BY AUTOMATED COUNT: 14.9 % (ref 11.9–14.6)
ERYTHROCYTE [DISTWIDTH] IN BLOOD BY AUTOMATED COUNT: 15.1 % (ref 11.9–14.6)
ERYTHROCYTE [DISTWIDTH] IN BLOOD BY AUTOMATED COUNT: 15.2 % (ref 11.9–14.6)
ERYTHROCYTE [DISTWIDTH] IN BLOOD BY AUTOMATED COUNT: 15.3 % (ref 11.9–14.6)
ERYTHROCYTE [DISTWIDTH] IN BLOOD BY AUTOMATED COUNT: 15.4 % (ref 11.9–14.6)
ERYTHROCYTE [DISTWIDTH] IN BLOOD BY AUTOMATED COUNT: 15.5 % (ref 11.9–14.6)
ERYTHROCYTE [DISTWIDTH] IN BLOOD BY AUTOMATED COUNT: 15.6 % (ref 11.9–14.6)
ERYTHROCYTE [DISTWIDTH] IN BLOOD BY AUTOMATED COUNT: 15.7 % (ref 11.9–14.6)
ERYTHROCYTE [DISTWIDTH] IN BLOOD BY AUTOMATED COUNT: 15.8 % (ref 11.9–14.6)
ERYTHROCYTE [DISTWIDTH] IN BLOOD BY AUTOMATED COUNT: 15.9 % (ref 11.9–14.6)
ERYTHROCYTE [DISTWIDTH] IN BLOOD BY AUTOMATED COUNT: 16 % (ref 11.9–14.6)
ERYTHROCYTE [DISTWIDTH] IN BLOOD BY AUTOMATED COUNT: 16.1 % (ref 11.9–14.6)
ERYTHROCYTE [DISTWIDTH] IN BLOOD BY AUTOMATED COUNT: 16.1 % (ref 11.9–14.6)
ERYTHROCYTE [DISTWIDTH] IN BLOOD BY AUTOMATED COUNT: 16.2 % (ref 11.9–14.6)
ERYTHROCYTE [DISTWIDTH] IN BLOOD BY AUTOMATED COUNT: 16.3 % (ref 11.9–14.6)
EXHALED MINUTE VOLUME, VE: 5 L/MIN
EXHALED MINUTE VOLUME, VE: 5.4 L/MIN
EXHALED MINUTE VOLUME, VE: 6.9 L/MIN
EXHALED MINUTE VOLUME, VE: 7.4 L/MIN
EXHALED MINUTE VOLUME, VE: 9.9 L/MIN
FERRITIN SERPL-MCNC: 102 NG/ML (ref 8–388)
FERRITIN SERPL-MCNC: 30 NG/ML (ref 8–388)
FERRITIN SERPL-MCNC: 32 NG/ML (ref 8–388)
FERRITIN SERPL-MCNC: 44 NG/ML (ref 8–388)
FERRITIN SERPL-MCNC: 49 NG/ML (ref 8–388)
FERRITIN SERPL-MCNC: 81 NG/ML (ref 8–388)
FLOW RATE ISTAT,IFRATE: 3 L/MIN
FLOW RATE ISTAT,IFRATE: 4 L/MIN
FLOW RATE ISTAT,IFRATE: 5 L/MIN
FOLATE SERPL-MCNC: 36.2 NG/ML (ref 3.1–17.5)
GAS FLOW.O2 O2 DELIVERY SYS: ABNORMAL L/MIN
GAS FLOW.O2 SETTING OXYMISER: 12 BPM
GAS FLOW.O2 SETTING OXYMISER: 15 BPM
GAS FLOW.O2 SETTING OXYMISER: 18 BPM
GAS FLOW.O2 SETTING OXYMISER: 18 BPM
GAS FLOW.O2 SETTING OXYMISER: 22 BPM
GAS FLOW.O2 SETTING OXYMISER: 8 BPM
GLOBULIN SER CALC-MCNC: 3.1 G/DL (ref 2.3–3.5)
GLOBULIN SER CALC-MCNC: 3.4 G/DL (ref 2.3–3.5)
GLOBULIN SER CALC-MCNC: 3.4 G/DL (ref 2.3–3.5)
GLOBULIN SER CALC-MCNC: 3.5 G/DL (ref 2.3–3.5)
GLOBULIN SER CALC-MCNC: 3.6 G/DL (ref 2.3–3.5)
GLOBULIN SER CALC-MCNC: 3.7 G/DL (ref 2.3–3.5)
GLUCOSE BLD STRIP.AUTO-MCNC: 101 MG/DL (ref 65–100)
GLUCOSE BLD STRIP.AUTO-MCNC: 102 MG/DL (ref 65–100)
GLUCOSE BLD STRIP.AUTO-MCNC: 108 MG/DL (ref 65–100)
GLUCOSE BLD STRIP.AUTO-MCNC: 108 MG/DL (ref 65–100)
GLUCOSE BLD STRIP.AUTO-MCNC: 110 MG/DL (ref 65–100)
GLUCOSE BLD STRIP.AUTO-MCNC: 110 MG/DL (ref 65–100)
GLUCOSE BLD STRIP.AUTO-MCNC: 111 MG/DL (ref 65–100)
GLUCOSE BLD STRIP.AUTO-MCNC: 112 MG/DL (ref 65–100)
GLUCOSE BLD STRIP.AUTO-MCNC: 114 MG/DL (ref 65–100)
GLUCOSE BLD STRIP.AUTO-MCNC: 114 MG/DL (ref 65–100)
GLUCOSE BLD STRIP.AUTO-MCNC: 117 MG/DL (ref 65–100)
GLUCOSE BLD STRIP.AUTO-MCNC: 117 MG/DL (ref 65–100)
GLUCOSE BLD STRIP.AUTO-MCNC: 118 MG/DL (ref 65–100)
GLUCOSE BLD STRIP.AUTO-MCNC: 120 MG/DL (ref 65–100)
GLUCOSE BLD STRIP.AUTO-MCNC: 121 MG/DL (ref 65–100)
GLUCOSE BLD STRIP.AUTO-MCNC: 123 MG/DL (ref 65–100)
GLUCOSE BLD STRIP.AUTO-MCNC: 125 MG/DL (ref 65–100)
GLUCOSE BLD STRIP.AUTO-MCNC: 129 MG/DL (ref 65–100)
GLUCOSE BLD STRIP.AUTO-MCNC: 133 MG/DL (ref 65–100)
GLUCOSE BLD STRIP.AUTO-MCNC: 135 MG/DL (ref 65–100)
GLUCOSE BLD STRIP.AUTO-MCNC: 135 MG/DL (ref 65–100)
GLUCOSE BLD STRIP.AUTO-MCNC: 137 MG/DL (ref 65–100)
GLUCOSE BLD STRIP.AUTO-MCNC: 138 MG/DL (ref 65–100)
GLUCOSE BLD STRIP.AUTO-MCNC: 143 MG/DL (ref 65–100)
GLUCOSE BLD STRIP.AUTO-MCNC: 143 MG/DL (ref 65–100)
GLUCOSE BLD STRIP.AUTO-MCNC: 151 MG/DL (ref 65–100)
GLUCOSE BLD STRIP.AUTO-MCNC: 152 MG/DL (ref 65–100)
GLUCOSE BLD STRIP.AUTO-MCNC: 153 MG/DL (ref 65–100)
GLUCOSE BLD STRIP.AUTO-MCNC: 154 MG/DL (ref 65–100)
GLUCOSE BLD STRIP.AUTO-MCNC: 157 MG/DL (ref 65–100)
GLUCOSE BLD STRIP.AUTO-MCNC: 157 MG/DL (ref 65–100)
GLUCOSE BLD STRIP.AUTO-MCNC: 158 MG/DL (ref 65–100)
GLUCOSE BLD STRIP.AUTO-MCNC: 164 MG/DL (ref 65–100)
GLUCOSE BLD STRIP.AUTO-MCNC: 166 MG/DL (ref 65–100)
GLUCOSE BLD STRIP.AUTO-MCNC: 168 MG/DL (ref 65–100)
GLUCOSE BLD STRIP.AUTO-MCNC: 169 MG/DL (ref 65–100)
GLUCOSE BLD STRIP.AUTO-MCNC: 186 MG/DL (ref 65–100)
GLUCOSE BLD STRIP.AUTO-MCNC: 186 MG/DL (ref 65–100)
GLUCOSE BLD STRIP.AUTO-MCNC: 189 MG/DL (ref 65–100)
GLUCOSE BLD STRIP.AUTO-MCNC: 191 MG/DL (ref 65–100)
GLUCOSE BLD STRIP.AUTO-MCNC: 205 MG/DL (ref 65–100)
GLUCOSE SERPL-MCNC: 101 MG/DL (ref 65–100)
GLUCOSE SERPL-MCNC: 104 MG/DL (ref 65–100)
GLUCOSE SERPL-MCNC: 104 MG/DL (ref 65–100)
GLUCOSE SERPL-MCNC: 107 MG/DL (ref 65–100)
GLUCOSE SERPL-MCNC: 107 MG/DL (ref 65–100)
GLUCOSE SERPL-MCNC: 109 MG/DL (ref 65–100)
GLUCOSE SERPL-MCNC: 109 MG/DL (ref 65–100)
GLUCOSE SERPL-MCNC: 111 MG/DL (ref 65–100)
GLUCOSE SERPL-MCNC: 112 MG/DL (ref 65–100)
GLUCOSE SERPL-MCNC: 112 MG/DL (ref 65–100)
GLUCOSE SERPL-MCNC: 113 MG/DL (ref 65–100)
GLUCOSE SERPL-MCNC: 115 MG/DL (ref 65–100)
GLUCOSE SERPL-MCNC: 115 MG/DL (ref 65–100)
GLUCOSE SERPL-MCNC: 123 MG/DL (ref 65–100)
GLUCOSE SERPL-MCNC: 124 MG/DL (ref 65–100)
GLUCOSE SERPL-MCNC: 126 MG/DL (ref 65–100)
GLUCOSE SERPL-MCNC: 128 MG/DL (ref 65–100)
GLUCOSE SERPL-MCNC: 129 MG/DL (ref 65–100)
GLUCOSE SERPL-MCNC: 130 MG/DL (ref 65–100)
GLUCOSE SERPL-MCNC: 131 MG/DL (ref 65–100)
GLUCOSE SERPL-MCNC: 142 MG/DL (ref 65–100)
GLUCOSE SERPL-MCNC: 194 MG/DL (ref 65–100)
GLUCOSE SERPL-MCNC: 242 MG/DL (ref 65–100)
GLUCOSE SERPL-MCNC: 72 MG/DL (ref 65–100)
GLUCOSE SERPL-MCNC: 84 MG/DL (ref 65–100)
GLUCOSE SERPL-MCNC: 86 MG/DL (ref 65–100)
GLUCOSE SERPL-MCNC: 87 MG/DL (ref 65–100)
GLUCOSE SERPL-MCNC: 88 MG/DL (ref 65–100)
GLUCOSE SERPL-MCNC: 89 MG/DL (ref 65–100)
GLUCOSE SERPL-MCNC: 90 MG/DL (ref 65–100)
GLUCOSE SERPL-MCNC: 92 MG/DL (ref 65–100)
GLUCOSE SERPL-MCNC: 92 MG/DL (ref 65–100)
GLUCOSE SERPL-MCNC: 93 MG/DL (ref 65–100)
GLUCOSE SERPL-MCNC: 94 MG/DL (ref 65–100)
GLUCOSE SERPL-MCNC: 95 MG/DL (ref 65–100)
GLUCOSE SERPL-MCNC: 97 MG/DL (ref 65–100)
GLUCOSE SERPL-MCNC: 98 MG/DL (ref 65–100)
GLUCOSE SERPL-MCNC: 98 MG/DL (ref 65–100)
GLUCOSE UR STRIP.AUTO-MCNC: NEGATIVE MG/DL
GLUCOSE UR STRIP.AUTO-MCNC: NEGATIVE MG/DL
HCO3 BLD-SCNC: 37.8 MMOL/L (ref 22–26)
HCO3 BLD-SCNC: 38.4 MMOL/L (ref 22–26)
HCO3 BLD-SCNC: 38.6 MMOL/L (ref 22–26)
HCO3 BLD-SCNC: 39.5 MMOL/L (ref 22–26)
HCO3 BLD-SCNC: 39.9 MMOL/L (ref 22–26)
HCO3 BLD-SCNC: 40.1 MMOL/L (ref 22–26)
HCO3 BLD-SCNC: 40.5 MMOL/L (ref 22–26)
HCO3 BLD-SCNC: 42.8 MMOL/L (ref 22–26)
HCO3 BLD-SCNC: 44.6 MMOL/L (ref 22–26)
HCO3 BLD-SCNC: 48.3 MMOL/L (ref 22–26)
HCO3 BLDV-SCNC: 39.7 MMOL/L (ref 23–28)
HCO3 BLDV-SCNC: 40.6 MMOL/L (ref 23–28)
HCT VFR BLD AUTO: 22.6 % (ref 35.8–46.3)
HCT VFR BLD AUTO: 23.3 % (ref 35.8–46.3)
HCT VFR BLD AUTO: 23.7 % (ref 35.8–46.3)
HCT VFR BLD AUTO: 24.6 % (ref 35.8–46.3)
HCT VFR BLD AUTO: 24.7 % (ref 35.8–46.3)
HCT VFR BLD AUTO: 24.9 % (ref 35.8–46.3)
HCT VFR BLD AUTO: 25.1 % (ref 35.8–46.3)
HCT VFR BLD AUTO: 25.2 % (ref 35.8–46.3)
HCT VFR BLD AUTO: 25.6 % (ref 35.8–46.3)
HCT VFR BLD AUTO: 25.7 % (ref 35.8–46.3)
HCT VFR BLD AUTO: 25.9 % (ref 35.8–46.3)
HCT VFR BLD AUTO: 26.1 % (ref 35.8–46.3)
HCT VFR BLD AUTO: 26.1 % (ref 35.8–46.3)
HCT VFR BLD AUTO: 26.2 % (ref 35.8–46.3)
HCT VFR BLD AUTO: 26.4 % (ref 35.8–46.3)
HCT VFR BLD AUTO: 26.5 % (ref 35.8–46.3)
HCT VFR BLD AUTO: 26.5 % (ref 35.8–46.3)
HCT VFR BLD AUTO: 26.6 % (ref 35.8–46.3)
HCT VFR BLD AUTO: 26.8 % (ref 35.8–46.3)
HCT VFR BLD AUTO: 26.9 % (ref 35.8–46.3)
HCT VFR BLD AUTO: 27.1 % (ref 35.8–46.3)
HCT VFR BLD AUTO: 27.1 % (ref 35.8–46.3)
HCT VFR BLD AUTO: 27.2 % (ref 35.8–46.3)
HCT VFR BLD AUTO: 27.2 % (ref 35.8–46.3)
HCT VFR BLD AUTO: 27.3 % (ref 35.8–46.3)
HCT VFR BLD AUTO: 27.3 % (ref 35.8–46.3)
HCT VFR BLD AUTO: 27.4 % (ref 35.8–46.3)
HCT VFR BLD AUTO: 27.5 % (ref 35.8–46.3)
HCT VFR BLD AUTO: 27.7 % (ref 35.8–46.3)
HCT VFR BLD AUTO: 27.8 % (ref 35.8–46.3)
HCT VFR BLD AUTO: 28 % (ref 35.8–46.3)
HCT VFR BLD AUTO: 28.1 % (ref 35.8–46.3)
HCT VFR BLD AUTO: 28.1 % (ref 35.8–46.3)
HCT VFR BLD AUTO: 28.2 % (ref 35.8–46.3)
HCT VFR BLD AUTO: 28.3 % (ref 35.8–46.3)
HCT VFR BLD AUTO: 28.4 % (ref 35.8–46.3)
HCT VFR BLD AUTO: 28.5 % (ref 35.8–46.3)
HCT VFR BLD AUTO: 28.6 % (ref 35.8–46.3)
HCT VFR BLD AUTO: 28.8 % (ref 35.8–46.3)
HCT VFR BLD AUTO: 29.2 % (ref 35.8–46.3)
HCT VFR BLD AUTO: 29.2 % (ref 35.8–46.3)
HCT VFR BLD AUTO: 29.3 % (ref 35.8–46.3)
HCT VFR BLD AUTO: 29.8 % (ref 35.8–46.3)
HCT VFR BLD AUTO: 30.4 % (ref 35.8–46.3)
HCT VFR BLD AUTO: 30.9 % (ref 35.8–46.3)
HCT VFR BLD AUTO: 30.9 % (ref 35.8–46.3)
HCT VFR BLD AUTO: 31.5 % (ref 35.8–46.3)
HEMOCCULT STL QL: POSITIVE
HGB BLD-MCNC: 10.1 G/DL (ref 11.7–15.4)
HGB BLD-MCNC: 7.1 G/DL (ref 11.7–15.4)
HGB BLD-MCNC: 7.2 G/DL (ref 11.7–15.4)
HGB BLD-MCNC: 7.3 G/DL (ref 11.7–15.4)
HGB BLD-MCNC: 7.4 G/DL (ref 11.7–15.4)
HGB BLD-MCNC: 7.4 G/DL (ref 11.7–15.4)
HGB BLD-MCNC: 7.5 G/DL (ref 11.7–15.4)
HGB BLD-MCNC: 7.6 G/DL (ref 11.7–15.4)
HGB BLD-MCNC: 7.7 G/DL (ref 11.7–15.4)
HGB BLD-MCNC: 7.8 G/DL (ref 11.7–15.4)
HGB BLD-MCNC: 7.9 G/DL (ref 11.7–15.4)
HGB BLD-MCNC: 8 G/DL (ref 11.7–15.4)
HGB BLD-MCNC: 8.1 G/DL (ref 11.7–15.4)
HGB BLD-MCNC: 8.2 G/DL (ref 11.7–15.4)
HGB BLD-MCNC: 8.2 G/DL (ref 11.7–15.4)
HGB BLD-MCNC: 8.3 G/DL (ref 11.7–15.4)
HGB BLD-MCNC: 8.4 G/DL (ref 11.7–15.4)
HGB BLD-MCNC: 8.5 G/DL (ref 11.7–15.4)
HGB BLD-MCNC: 8.6 G/DL (ref 11.7–15.4)
HGB BLD-MCNC: 8.7 G/DL (ref 11.7–15.4)
HGB BLD-MCNC: 8.8 G/DL (ref 11.7–15.4)
HGB BLD-MCNC: 8.8 G/DL (ref 11.7–15.4)
HGB BLD-MCNC: 8.9 G/DL (ref 11.7–15.4)
HGB BLD-MCNC: 9.3 G/DL (ref 11.7–15.4)
HGB BLD-MCNC: 9.4 G/DL (ref 11.7–15.4)
HGB BLD-MCNC: 9.5 G/DL (ref 11.7–15.4)
HGB BLD-MCNC: 9.6 G/DL (ref 11.7–15.4)
HGB BLD-MCNC: 9.8 G/DL (ref 11.7–15.4)
HGB RETIC QN AUTO: 29 PG (ref 29–35)
HGB UR QL STRIP: NEGATIVE
HGB UR QL STRIP: NEGATIVE
IMM GRANULOCYTES # BLD AUTO: 0 K/UL (ref 0–0.5)
IMM GRANULOCYTES # BLD AUTO: 0.1 K/UL (ref 0–0.5)
IMM GRANULOCYTES NFR BLD AUTO: 0 % (ref 0–5)
IMM GRANULOCYTES NFR BLD AUTO: 1 % (ref 0–5)
IMM RETICS NFR: 10.6 % (ref 3–15.9)
INR BLD: 1.5 (ref 0.9–1.1)
INR BLD: 2.7 (ref 0.9–1.1)
INR BLD: 2.8 (ref 0.9–1.1)
INR BLD: 2.9 (ref 0.9–1.1)
INR BLD: 3 (ref 0.9–1.1)
INR BLD: 3.3 (ref 0.9–1.1)
INR BLD: 3.5 (ref 0.9–1.1)
INR BLD: 4.1 (ref 0.9–1.1)
INR BLD: 4.2 (ref 0.9–1.1)
INR PPP: 1.5
INR PPP: 1.6
INR PPP: 1.7
INR PPP: 1.8
INR PPP: 1.8
INR PPP: 1.9
INR PPP: 2
INR PPP: 2
INR PPP: 2.1
INR PPP: 2.2
INR PPP: 2.4
INR PPP: 2.5
INR PPP: 2.6
INR PPP: 2.7
INR PPP: 2.9
INR PPP: 3.2
INR PPP: 3.6
INR PPP: 4
INSPIRATION.DURATION SETTING TIME VENT: 0.9 SEC
INSPIRATION.DURATION SETTING TIME VENT: 0.9 SEC
INSPIRATION.DURATION SETTING TIME VENT: 1 SEC
IRON SATN MFR SERPL: 19 %
IRON SATN MFR SERPL: 24 %
IRON SATN MFR SERPL: 29 %
IRON SATN MFR SERPL: 30 %
IRON SATN MFR SERPL: 33 %
IRON SATN MFR SERPL: 34 %
IRON SERPL-MCNC: 136 UG/DL (ref 35–150)
IRON SERPL-MCNC: 38 UG/DL (ref 35–150)
IRON SERPL-MCNC: 69 UG/DL (ref 35–150)
IRON SERPL-MCNC: 78 UG/DL (ref 35–150)
IRON SERPL-MCNC: 81 UG/DL (ref 35–150)
IRON SERPL-MCNC: 95 UG/DL (ref 35–150)
KETONES UR QL STRIP.AUTO: NEGATIVE MG/DL
KETONES UR QL STRIP.AUTO: NEGATIVE MG/DL
LACTATE BLD-SCNC: 3 MMOL/L (ref 0.5–1.9)
LACTATE BLD-SCNC: 5.02 MMOL/L (ref 0.5–1.9)
LEUKOCYTE ESTERASE UR QL STRIP.AUTO: NEGATIVE
LEUKOCYTE ESTERASE UR QL STRIP.AUTO: NEGATIVE
LYMPHOCYTES # BLD: 0.6 K/UL (ref 0.5–4.6)
LYMPHOCYTES # BLD: 0.6 K/UL (ref 0.5–4.6)
LYMPHOCYTES # BLD: 0.7 K/UL (ref 0.5–4.6)
LYMPHOCYTES # BLD: 0.8 K/UL (ref 0.5–4.6)
LYMPHOCYTES # BLD: 0.8 K/UL (ref 0.5–4.6)
LYMPHOCYTES # BLD: 0.9 K/UL (ref 0.5–4.6)
LYMPHOCYTES # BLD: 1 K/UL (ref 0.5–4.6)
LYMPHOCYTES # BLD: 1.1 K/UL (ref 0.5–4.6)
LYMPHOCYTES # BLD: 1.2 K/UL (ref 0.5–4.6)
LYMPHOCYTES # BLD: 1.3 K/UL (ref 0.5–4.6)
LYMPHOCYTES # BLD: 1.4 K/UL (ref 0.5–4.6)
LYMPHOCYTES # BLD: 1.4 K/UL (ref 0.5–4.6)
LYMPHOCYTES NFR BLD: 10 % (ref 13–44)
LYMPHOCYTES NFR BLD: 12 % (ref 13–44)
LYMPHOCYTES NFR BLD: 12 % (ref 13–44)
LYMPHOCYTES NFR BLD: 13 % (ref 13–44)
LYMPHOCYTES NFR BLD: 14 % (ref 13–44)
LYMPHOCYTES NFR BLD: 14 % (ref 13–44)
LYMPHOCYTES NFR BLD: 15 % (ref 13–44)
LYMPHOCYTES NFR BLD: 16 % (ref 13–44)
LYMPHOCYTES NFR BLD: 16 % (ref 13–44)
LYMPHOCYTES NFR BLD: 17 % (ref 13–44)
LYMPHOCYTES NFR BLD: 18 % (ref 13–44)
LYMPHOCYTES NFR BLD: 19 % (ref 13–44)
LYMPHOCYTES NFR BLD: 20 % (ref 13–44)
LYMPHOCYTES NFR BLD: 21 % (ref 13–44)
LYMPHOCYTES NFR BLD: 22 % (ref 13–44)
LYMPHOCYTES NFR BLD: 7 % (ref 13–44)
LYMPHOCYTES NFR BLD: 9 % (ref 13–44)
MAGNESIUM SERPL-MCNC: 1.8 MG/DL (ref 1.8–2.4)
MAGNESIUM SERPL-MCNC: 1.8 MG/DL (ref 1.8–2.4)
MAGNESIUM SERPL-MCNC: 1.9 MG/DL (ref 1.8–2.4)
MCH RBC QN AUTO: 27.4 PG (ref 26.1–32.9)
MCH RBC QN AUTO: 27.6 PG (ref 26.1–32.9)
MCH RBC QN AUTO: 27.6 PG (ref 26.1–32.9)
MCH RBC QN AUTO: 27.7 PG (ref 26.1–32.9)
MCH RBC QN AUTO: 27.9 PG (ref 26.1–32.9)
MCH RBC QN AUTO: 28 PG (ref 26.1–32.9)
MCH RBC QN AUTO: 28.1 PG (ref 26.1–32.9)
MCH RBC QN AUTO: 28.2 PG (ref 26.1–32.9)
MCH RBC QN AUTO: 28.2 PG (ref 26.1–32.9)
MCH RBC QN AUTO: 28.3 PG (ref 26.1–32.9)
MCH RBC QN AUTO: 28.3 PG (ref 26.1–32.9)
MCH RBC QN AUTO: 28.4 PG (ref 26.1–32.9)
MCH RBC QN AUTO: 28.5 PG (ref 26.1–32.9)
MCH RBC QN AUTO: 28.6 PG (ref 26.1–32.9)
MCH RBC QN AUTO: 28.6 PG (ref 26.1–32.9)
MCH RBC QN AUTO: 28.7 PG (ref 26.1–32.9)
MCH RBC QN AUTO: 28.8 PG (ref 26.1–32.9)
MCH RBC QN AUTO: 28.8 PG (ref 26.1–32.9)
MCH RBC QN AUTO: 29 PG (ref 26.1–32.9)
MCHC RBC AUTO-ENTMCNC: 28.5 G/DL (ref 31.4–35)
MCHC RBC AUTO-ENTMCNC: 28.6 G/DL (ref 31.4–35)
MCHC RBC AUTO-ENTMCNC: 28.8 G/DL (ref 31.4–35)
MCHC RBC AUTO-ENTMCNC: 29 G/DL (ref 31.4–35)
MCHC RBC AUTO-ENTMCNC: 29.2 G/DL (ref 31.4–35)
MCHC RBC AUTO-ENTMCNC: 29.3 G/DL (ref 31.4–35)
MCHC RBC AUTO-ENTMCNC: 29.4 G/DL (ref 31.4–35)
MCHC RBC AUTO-ENTMCNC: 29.4 G/DL (ref 31.4–35)
MCHC RBC AUTO-ENTMCNC: 29.6 G/DL (ref 31.4–35)
MCHC RBC AUTO-ENTMCNC: 29.8 G/DL (ref 31.4–35)
MCHC RBC AUTO-ENTMCNC: 29.9 G/DL (ref 31.4–35)
MCHC RBC AUTO-ENTMCNC: 30 G/DL (ref 31.4–35)
MCHC RBC AUTO-ENTMCNC: 30.1 G/DL (ref 31.4–35)
MCHC RBC AUTO-ENTMCNC: 30.1 G/DL (ref 31.4–35)
MCHC RBC AUTO-ENTMCNC: 30.2 G/DL (ref 31.4–35)
MCHC RBC AUTO-ENTMCNC: 30.3 G/DL (ref 31.4–35)
MCHC RBC AUTO-ENTMCNC: 30.4 G/DL (ref 31.4–35)
MCHC RBC AUTO-ENTMCNC: 30.4 G/DL (ref 31.4–35)
MCHC RBC AUTO-ENTMCNC: 30.5 G/DL (ref 31.4–35)
MCHC RBC AUTO-ENTMCNC: 30.5 G/DL (ref 31.4–35)
MCHC RBC AUTO-ENTMCNC: 30.8 G/DL (ref 31.4–35)
MCHC RBC AUTO-ENTMCNC: 30.9 G/DL (ref 31.4–35)
MCHC RBC AUTO-ENTMCNC: 31 G/DL (ref 31.4–35)
MCHC RBC AUTO-ENTMCNC: 31.4 G/DL (ref 31.4–35)
MCHC RBC AUTO-ENTMCNC: 31.6 G/DL (ref 31.4–35)
MCV RBC AUTO: 100 FL (ref 79.6–97.8)
MCV RBC AUTO: 100.3 FL (ref 79.6–97.8)
MCV RBC AUTO: 86.6 FL (ref 79.6–97.8)
MCV RBC AUTO: 88.7 FL (ref 79.6–97.8)
MCV RBC AUTO: 89.3 FL (ref 79.6–97.8)
MCV RBC AUTO: 89.3 FL (ref 79.6–97.8)
MCV RBC AUTO: 90.5 FL (ref 79.6–97.8)
MCV RBC AUTO: 90.9 FL (ref 79.6–97.8)
MCV RBC AUTO: 91.1 FL (ref 79.6–97.8)
MCV RBC AUTO: 91.1 FL (ref 79.6–97.8)
MCV RBC AUTO: 91.5 FL (ref 79.6–97.8)
MCV RBC AUTO: 92.2 FL (ref 79.6–97.8)
MCV RBC AUTO: 92.5 FL (ref 79.6–97.8)
MCV RBC AUTO: 92.8 FL (ref 79.6–97.8)
MCV RBC AUTO: 93.3 FL (ref 79.6–97.8)
MCV RBC AUTO: 93.4 FL (ref 79.6–97.8)
MCV RBC AUTO: 93.7 FL (ref 79.6–97.8)
MCV RBC AUTO: 93.8 FL (ref 79.6–97.8)
MCV RBC AUTO: 94.1 FL (ref 79.6–97.8)
MCV RBC AUTO: 94.3 FL (ref 79.6–97.8)
MCV RBC AUTO: 94.3 FL (ref 79.6–97.8)
MCV RBC AUTO: 94.6 FL (ref 79.6–97.8)
MCV RBC AUTO: 94.7 FL (ref 79.6–97.8)
MCV RBC AUTO: 94.9 FL (ref 79.6–97.8)
MCV RBC AUTO: 95.3 FL (ref 79.6–97.8)
MCV RBC AUTO: 95.5 FL (ref 79.6–97.8)
MCV RBC AUTO: 95.6 FL (ref 79.6–97.8)
MCV RBC AUTO: 95.8 FL (ref 79.6–97.8)
MCV RBC AUTO: 96.1 FL (ref 79.6–97.8)
MCV RBC AUTO: 96.7 FL (ref 79.6–97.8)
MCV RBC AUTO: 96.7 FL (ref 79.6–97.8)
MCV RBC AUTO: 98.8 FL (ref 79.6–97.8)
MCV RBC AUTO: 99.4 FL (ref 79.6–97.8)
MM INDURATION POC: 0 MM (ref 0–5)
MONOCYTES # BLD: 0.3 K/UL (ref 0.1–1.3)
MONOCYTES # BLD: 0.4 K/UL (ref 0.1–1.3)
MONOCYTES # BLD: 0.5 K/UL (ref 0.1–1.3)
MONOCYTES # BLD: 0.6 K/UL (ref 0.1–1.3)
MONOCYTES # BLD: 0.7 K/UL (ref 0.1–1.3)
MONOCYTES NFR BLD: 10 % (ref 4–12)
MONOCYTES NFR BLD: 11 % (ref 4–12)
MONOCYTES NFR BLD: 4 % (ref 4–12)
MONOCYTES NFR BLD: 6 % (ref 4–12)
MONOCYTES NFR BLD: 6 % (ref 4–12)
MONOCYTES NFR BLD: 7 % (ref 4–12)
MONOCYTES NFR BLD: 8 % (ref 4–12)
MONOCYTES NFR BLD: 9 % (ref 4–12)
NEUTS SEG # BLD: 3.4 K/UL (ref 1.7–8.2)
NEUTS SEG # BLD: 3.5 K/UL (ref 1.7–8.2)
NEUTS SEG # BLD: 3.6 K/UL (ref 1.7–8.2)
NEUTS SEG # BLD: 3.6 K/UL (ref 1.7–8.2)
NEUTS SEG # BLD: 3.7 K/UL (ref 1.7–8.2)
NEUTS SEG # BLD: 3.8 K/UL (ref 1.7–8.2)
NEUTS SEG # BLD: 3.9 K/UL (ref 1.7–8.2)
NEUTS SEG # BLD: 3.9 K/UL (ref 1.7–8.2)
NEUTS SEG # BLD: 4.1 K/UL (ref 1.7–8.2)
NEUTS SEG # BLD: 4.2 K/UL (ref 1.7–8.2)
NEUTS SEG # BLD: 4.3 K/UL (ref 1.7–8.2)
NEUTS SEG # BLD: 4.3 K/UL (ref 1.7–8.2)
NEUTS SEG # BLD: 4.4 K/UL (ref 1.7–8.2)
NEUTS SEG # BLD: 4.5 K/UL (ref 1.7–8.2)
NEUTS SEG # BLD: 4.6 K/UL (ref 1.7–8.2)
NEUTS SEG # BLD: 4.6 K/UL (ref 1.7–8.2)
NEUTS SEG # BLD: 4.7 K/UL (ref 1.7–8.2)
NEUTS SEG # BLD: 4.7 K/UL (ref 1.7–8.2)
NEUTS SEG # BLD: 4.8 K/UL (ref 1.7–8.2)
NEUTS SEG # BLD: 5.1 K/UL (ref 1.7–8.2)
NEUTS SEG # BLD: 5.3 K/UL (ref 1.7–8.2)
NEUTS SEG # BLD: 5.5 K/UL (ref 1.7–8.2)
NEUTS SEG # BLD: 5.5 K/UL (ref 1.7–8.2)
NEUTS SEG # BLD: 5.6 K/UL (ref 1.7–8.2)
NEUTS SEG # BLD: 6.1 K/UL (ref 1.7–8.2)
NEUTS SEG # BLD: 6.3 K/UL (ref 1.7–8.2)
NEUTS SEG # BLD: 7.3 K/UL (ref 1.7–8.2)
NEUTS SEG NFR BLD: 64 % (ref 43–78)
NEUTS SEG NFR BLD: 65 % (ref 43–78)
NEUTS SEG NFR BLD: 66 % (ref 43–78)
NEUTS SEG NFR BLD: 67 % (ref 43–78)
NEUTS SEG NFR BLD: 68 % (ref 43–78)
NEUTS SEG NFR BLD: 69 % (ref 43–78)
NEUTS SEG NFR BLD: 70 % (ref 43–78)
NEUTS SEG NFR BLD: 72 % (ref 43–78)
NEUTS SEG NFR BLD: 74 % (ref 43–78)
NEUTS SEG NFR BLD: 74 % (ref 43–78)
NEUTS SEG NFR BLD: 75 % (ref 43–78)
NEUTS SEG NFR BLD: 76 % (ref 43–78)
NEUTS SEG NFR BLD: 77 % (ref 43–78)
NEUTS SEG NFR BLD: 77 % (ref 43–78)
NEUTS SEG NFR BLD: 78 % (ref 43–78)
NEUTS SEG NFR BLD: 82 % (ref 43–78)
NEUTS SEG NFR BLD: 85 % (ref 43–78)
NEUTS SEG NFR BLD: 86 % (ref 43–78)
NITRITE UR QL STRIP.AUTO: NEGATIVE
NITRITE UR QL STRIP.AUTO: NEGATIVE
NRBC # BLD: 0 K/UL (ref 0–0.2)
NRBC # BLD: 0.01 K/UL (ref 0–0.2)
NRBC # BLD: 0.02 K/UL (ref 0–0.2)
NRBC # BLD: 0.02 K/UL (ref 0–0.2)
NRBC # BLD: 0.03 K/UL (ref 0–0.2)
NRBC # BLD: 0.04 K/UL (ref 0–0.2)
O2/TOTAL GAS SETTING VFR VENT: 100 %
O2/TOTAL GAS SETTING VFR VENT: 32 %
O2/TOTAL GAS SETTING VFR VENT: 36 %
O2/TOTAL GAS SETTING VFR VENT: 40 %
P-R INTERVAL, ECG05: 152 MS
P-R INTERVAL, ECG05: 164 MS
P-R INTERVAL, ECG05: 168 MS
P-R INTERVAL, ECG05: 176 MS
P-R INTERVAL, ECG05: 180 MS
PATH REV BLD -IMP: NORMAL
PCO2 BLD: 41 MMHG (ref 35–45)
PCO2 BLD: 41.7 MMHG (ref 35–45)
PCO2 BLD: 44 MMHG (ref 35–45)
PCO2 BLD: 50.7 MMHG (ref 35–45)
PCO2 BLD: 55.2 MMHG (ref 35–45)
PCO2 BLD: 63.8 MMHG (ref 35–45)
PCO2 BLD: 66.5 MMHG (ref 35–45)
PCO2 BLD: 68.2 MMHG (ref 35–45)
PCO2 BLD: 73.1 MMHG (ref 35–45)
PCO2 BLD: 99.5 MMHG (ref 35–45)
PCO2 BLDV: 63.1 MMHG (ref 41–51)
PCO2 BLDV: 79.6 MMHG (ref 41–51)
PEEP RESPIRATORY: 10 CMH2O
PEEP RESPIRATORY: 6 CMH2O
PEEP RESPIRATORY: 8 CMH2O
PH BLD: 7.21 [PH] (ref 7.35–7.45)
PH BLD: 7.39 [PH] (ref 7.35–7.45)
PH BLD: 7.39 [PH] (ref 7.35–7.45)
PH BLD: 7.42 [PH] (ref 7.35–7.45)
PH BLD: 7.43 [PH] (ref 7.35–7.45)
PH BLD: 7.47 [PH] (ref 7.35–7.45)
PH BLD: 7.54 [PH] (ref 7.35–7.45)
PH BLD: 7.56 [PH] (ref 7.35–7.45)
PH BLD: 7.57 [PH] (ref 7.35–7.45)
PH BLD: 7.58 [PH] (ref 7.35–7.45)
PH BLDV: 7.32 [PH] (ref 7.32–7.42)
PH BLDV: 7.41 [PH] (ref 7.32–7.42)
PH UR STRIP: 5 [PH] (ref 5–9)
PH UR STRIP: 5 [PH] (ref 5–9)
PHOSPHATE SERPL-MCNC: 3.5 MG/DL (ref 2.3–3.7)
PHOSPHATE SERPL-MCNC: 3.7 MG/DL (ref 2.3–3.7)
PHOSPHATE SERPL-MCNC: 3.8 MG/DL (ref 2.3–3.7)
PHOSPHATE SERPL-MCNC: 4.1 MG/DL (ref 2.3–3.7)
PHOSPHATE SERPL-MCNC: 4.4 MG/DL (ref 2.3–3.7)
PHOSPHATE SERPL-MCNC: 4.5 MG/DL (ref 2.3–3.7)
PHOSPHATE SERPL-MCNC: 4.6 MG/DL (ref 2.3–3.7)
PHOSPHATE SERPL-MCNC: 4.8 MG/DL (ref 2.3–3.7)
PHOSPHATE SERPL-MCNC: 5.1 MG/DL (ref 2.3–3.7)
PHOSPHATE SERPL-MCNC: 5.7 MG/DL (ref 2.3–3.7)
PLATELET # BLD AUTO: 100 K/UL (ref 150–450)
PLATELET # BLD AUTO: 109 K/UL (ref 150–450)
PLATELET # BLD AUTO: 116 K/UL (ref 150–450)
PLATELET # BLD AUTO: 117 K/UL (ref 150–450)
PLATELET # BLD AUTO: 119 K/UL (ref 150–450)
PLATELET # BLD AUTO: 119 K/UL (ref 150–450)
PLATELET # BLD AUTO: 122 K/UL (ref 150–450)
PLATELET # BLD AUTO: 122 K/UL (ref 150–450)
PLATELET # BLD AUTO: 125 K/UL (ref 150–450)
PLATELET # BLD AUTO: 128 K/UL (ref 150–450)
PLATELET # BLD AUTO: 131 K/UL (ref 150–450)
PLATELET # BLD AUTO: 132 K/UL (ref 150–450)
PLATELET # BLD AUTO: 133 K/UL (ref 150–450)
PLATELET # BLD AUTO: 137 K/UL (ref 150–450)
PLATELET # BLD AUTO: 137 K/UL (ref 150–450)
PLATELET # BLD AUTO: 141 K/UL (ref 150–450)
PLATELET # BLD AUTO: 142 K/UL (ref 150–450)
PLATELET # BLD AUTO: 146 K/UL (ref 150–450)
PLATELET # BLD AUTO: 146 K/UL (ref 150–450)
PLATELET # BLD AUTO: 147 K/UL (ref 150–450)
PLATELET # BLD AUTO: 151 K/UL (ref 150–450)
PLATELET # BLD AUTO: 153 K/UL (ref 150–450)
PLATELET # BLD AUTO: 166 K/UL (ref 150–450)
PLATELET # BLD AUTO: 168 K/UL (ref 150–450)
PLATELET # BLD AUTO: 169 K/UL (ref 150–450)
PLATELET # BLD AUTO: 179 K/UL (ref 150–450)
PLATELET # BLD AUTO: 179 K/UL (ref 150–450)
PLATELET # BLD AUTO: 184 K/UL (ref 150–450)
PLATELET # BLD AUTO: 185 K/UL (ref 150–450)
PLATELET # BLD AUTO: 186 K/UL (ref 150–450)
PLATELET # BLD AUTO: 187 K/UL (ref 150–450)
PLATELET # BLD AUTO: 204 K/UL (ref 150–450)
PMV BLD AUTO: 10.2 FL (ref 9.4–12.3)
PMV BLD AUTO: 10.4 FL (ref 9.4–12.3)
PMV BLD AUTO: 10.4 FL (ref 9.4–12.3)
PMV BLD AUTO: 11 FL (ref 9.4–12.3)
PMV BLD AUTO: 11.1 FL (ref 9.4–12.3)
PMV BLD AUTO: 11.1 FL (ref 9.4–12.3)
PMV BLD AUTO: 11.2 FL (ref 9.4–12.3)
PMV BLD AUTO: 11.3 FL (ref 9.4–12.3)
PMV BLD AUTO: 11.4 FL (ref 9.4–12.3)
PMV BLD AUTO: 11.5 FL (ref 9.4–12.3)
PMV BLD AUTO: 11.6 FL (ref 9.4–12.3)
PMV BLD AUTO: 11.7 FL (ref 9.4–12.3)
PMV BLD AUTO: 11.8 FL (ref 9.4–12.3)
PMV BLD AUTO: 11.9 FL (ref 9.4–12.3)
PMV BLD AUTO: 12 FL (ref 9.4–12.3)
PMV BLD AUTO: 12 FL (ref 9.4–12.3)
PMV BLD AUTO: 12.1 FL (ref 9.4–12.3)
PMV BLD AUTO: 12.3 FL (ref 9.4–12.3)
PMV BLD AUTO: 12.3 FL (ref 9.4–12.3)
PMV BLD AUTO: 12.5 FL (ref 9.4–12.3)
PMV BLD AUTO: 12.7 FL (ref 9.4–12.3)
PO2 BLD: 101 MMHG (ref 75–100)
PO2 BLD: 114 MMHG (ref 75–100)
PO2 BLD: 150 MMHG (ref 75–100)
PO2 BLD: 257 MMHG (ref 75–100)
PO2 BLD: 70 MMHG (ref 75–100)
PO2 BLD: 72 MMHG (ref 75–100)
PO2 BLD: 75 MMHG (ref 75–100)
PO2 BLD: 78 MMHG (ref 75–100)
PO2 BLD: 79 MMHG (ref 75–100)
PO2 BLD: 95 MMHG (ref 75–100)
PO2 BLDV: 22 MMHG
PO2 BLDV: 42 MMHG
POTASSIUM BLD-SCNC: 4.9 MMOL/L (ref 3.5–5.1)
POTASSIUM SERPL-SCNC: 3.6 MMOL/L (ref 3.5–5.1)
POTASSIUM SERPL-SCNC: 3.7 MMOL/L (ref 3.5–5.1)
POTASSIUM SERPL-SCNC: 3.7 MMOL/L (ref 3.5–5.1)
POTASSIUM SERPL-SCNC: 3.8 MMOL/L (ref 3.5–5.1)
POTASSIUM SERPL-SCNC: 3.8 MMOL/L (ref 3.5–5.1)
POTASSIUM SERPL-SCNC: 3.9 MMOL/L (ref 3.5–5.1)
POTASSIUM SERPL-SCNC: 4 MMOL/L (ref 3.5–5.1)
POTASSIUM SERPL-SCNC: 4.1 MMOL/L (ref 3.5–5.1)
POTASSIUM SERPL-SCNC: 4.2 MMOL/L (ref 3.5–5.1)
POTASSIUM SERPL-SCNC: 4.2 MMOL/L (ref 3.5–5.1)
POTASSIUM SERPL-SCNC: 4.3 MMOL/L (ref 3.5–5.1)
POTASSIUM SERPL-SCNC: 4.4 MMOL/L (ref 3.5–5.1)
POTASSIUM SERPL-SCNC: 4.4 MMOL/L (ref 3.5–5.1)
POTASSIUM SERPL-SCNC: 4.5 MMOL/L (ref 3.5–5.1)
POTASSIUM SERPL-SCNC: 4.5 MMOL/L (ref 3.5–5.1)
POTASSIUM SERPL-SCNC: 4.6 MMOL/L (ref 3.5–5.1)
POTASSIUM SERPL-SCNC: 4.8 MMOL/L (ref 3.5–5.1)
POTASSIUM SERPL-SCNC: 4.9 MMOL/L (ref 3.5–5.1)
POTASSIUM SERPL-SCNC: 4.9 MMOL/L (ref 3.5–5.1)
POTASSIUM SERPL-SCNC: 5 MMOL/L (ref 3.5–5.1)
POTASSIUM SERPL-SCNC: 5.1 MMOL/L (ref 3.5–5.1)
POTASSIUM SERPL-SCNC: 5.2 MMOL/L (ref 3.5–5.1)
POTASSIUM SERPL-SCNC: 5.2 MMOL/L (ref 3.5–5.1)
POTASSIUM SERPL-SCNC: 5.3 MMOL/L (ref 3.5–5.1)
POTASSIUM SERPL-SCNC: 5.3 MMOL/L (ref 3.5–5.1)
POTASSIUM SERPL-SCNC: 5.4 MMOL/L (ref 3.5–5.1)
POTASSIUM SERPL-SCNC: 5.6 MMOL/L (ref 3.5–5.1)
POTASSIUM SERPL-SCNC: 5.7 MMOL/L (ref 3.5–5.1)
POTASSIUM SERPL-SCNC: 7.1 MMOL/L (ref 3.5–5.1)
PPD POC: NEGATIVE NEGATIVE
PPD POC: NORMAL NEGATIVE
PRESSURE CONTROL, IPC: 15
PRESSURE CONTROL, IPC: 24
PRESSURE CONTROL, IPC: 26
PRESSURE CONTROL, IPC: 26
PROCALCITONIN SERPL-MCNC: 0.2 NG/ML
PROT SERPL-MCNC: 6 G/DL (ref 6.3–8.2)
PROT SERPL-MCNC: 6.2 G/DL (ref 6.3–8.2)
PROT SERPL-MCNC: 6.2 G/DL (ref 6.3–8.2)
PROT SERPL-MCNC: 6.4 G/DL (ref 6.3–8.2)
PROT SERPL-MCNC: 6.5 G/DL (ref 6.3–8.2)
PROT SERPL-MCNC: 6.6 G/DL (ref 6.3–8.2)
PROT SERPL-MCNC: 6.6 G/DL (ref 6.3–8.2)
PROT SERPL-MCNC: 6.8 G/DL (ref 6.3–8.2)
PROT SERPL-MCNC: 6.8 G/DL (ref 6.3–8.2)
PROT SERPL-MCNC: 6.9 G/DL (ref 6.3–8.2)
PROT SERPL-MCNC: 7.1 G/DL (ref 6.3–8.2)
PROT SERPL-MCNC: 7.2 G/DL (ref 6.3–8.2)
PROT SERPL-MCNC: 7.2 G/DL (ref 6.3–8.2)
PROT UR STRIP-MCNC: ABNORMAL MG/DL
PROT UR STRIP-MCNC: NEGATIVE MG/DL
PROT UR-MCNC: 15 MG/DL
PROT/CREAT UR-RTO: 0.2
PROTHROMBIN TIME: 17.4 SEC (ref 11.7–14.5)
PROTHROMBIN TIME: 18 SEC (ref 11.7–14.5)
PROTHROMBIN TIME: 18.1 SEC (ref 11.7–14.5)
PROTHROMBIN TIME: 18.8 SEC (ref 11.7–14.5)
PROTHROMBIN TIME: 18.9 SEC (ref 11.7–14.5)
PROTHROMBIN TIME: 19.3 SEC (ref 11.7–14.5)
PROTHROMBIN TIME: 19.6 SEC (ref 11.7–14.5)
PROTHROMBIN TIME: 19.8 SEC (ref 11.7–14.5)
PROTHROMBIN TIME: 19.8 SEC (ref 11.7–14.5)
PROTHROMBIN TIME: 19.9 SEC (ref 11.7–14.5)
PROTHROMBIN TIME: 20 SEC (ref 11.7–14.5)
PROTHROMBIN TIME: 20.3 SEC (ref 11.7–14.5)
PROTHROMBIN TIME: 20.8 SEC (ref 11.7–14.5)
PROTHROMBIN TIME: 20.8 SEC (ref 11.7–14.5)
PROTHROMBIN TIME: 20.9 SEC (ref 11.7–14.5)
PROTHROMBIN TIME: 21.2 SEC (ref 11.7–14.5)
PROTHROMBIN TIME: 21.4 SEC (ref 11.7–14.5)
PROTHROMBIN TIME: 22.1 SEC (ref 11.7–14.5)
PROTHROMBIN TIME: 22.3 SEC (ref 11.7–14.5)
PROTHROMBIN TIME: 23.3 SEC (ref 11.7–14.5)
PROTHROMBIN TIME: 23.9 SEC (ref 11.7–14.5)
PROTHROMBIN TIME: 24.1 SEC (ref 11.7–14.5)
PROTHROMBIN TIME: 24.1 SEC (ref 11.7–14.5)
PROTHROMBIN TIME: 24.3 SEC (ref 11.7–14.5)
PROTHROMBIN TIME: 24.3 SEC (ref 11.7–14.5)
PROTHROMBIN TIME: 24.7 SEC (ref 11.7–14.5)
PROTHROMBIN TIME: 25.8 SEC (ref 11.7–14.5)
PROTHROMBIN TIME: 26.6 SEC (ref 11.7–14.5)
PROTHROMBIN TIME: 26.6 SEC (ref 11.7–14.5)
PROTHROMBIN TIME: 26.9 SEC (ref 11.7–14.5)
PROTHROMBIN TIME: 28 SEC (ref 11.7–14.5)
PROTHROMBIN TIME: 28.6 SEC (ref 11.7–14.5)
PROTHROMBIN TIME: 29.5 SEC (ref 11.7–14.5)
PROTHROMBIN TIME: 32.3 SEC (ref 11.7–14.5)
PROTHROMBIN TIME: 35.3 SEC (ref 11.7–14.5)
PROTHROMBIN TIME: 48.4 SEC (ref 11.7–14.5)
PT POC: 13.7 SECONDS (ref 11.8–14.9)
PT POC: 39.2 SECONDS (ref 11.8–14.9)
PT POC: 40 SECONDS (ref 11.8–14.9)
PT POC: 41 SECONDS (ref 11.8–14.9)
Q-T INTERVAL, ECG07: 376 MS
Q-T INTERVAL, ECG07: 402 MS
Q-T INTERVAL, ECG07: 426 MS
Q-T INTERVAL, ECG07: 442 MS
Q-T INTERVAL, ECG07: 444 MS
QRS DURATION, ECG06: 126 MS
QRS DURATION, ECG06: 146 MS
QRS DURATION, ECG06: 150 MS
QRS DURATION, ECG06: 156 MS
QRS DURATION, ECG06: 166 MS
QTC CALCULATION (BEZET), ECG08: 433 MS
QTC CALCULATION (BEZET), ECG08: 469 MS
QTC CALCULATION (BEZET), ECG08: 472 MS
QTC CALCULATION (BEZET), ECG08: 485 MS
QTC CALCULATION (BEZET), ECG08: 500 MS
RBC # BLD AUTO: 2.52 M/UL (ref 4.05–5.2)
RBC # BLD AUTO: 2.53 M/UL (ref 4.05–5.2)
RBC # BLD AUTO: 2.59 M/UL (ref 4.05–5.2)
RBC # BLD AUTO: 2.6 M/UL (ref 4.05–5.2)
RBC # BLD AUTO: 2.69 M/UL (ref 4.05–5.2)
RBC # BLD AUTO: 2.71 M/UL (ref 4.05–5.2)
RBC # BLD AUTO: 2.74 M/UL (ref 4.05–5.2)
RBC # BLD AUTO: 2.75 M/UL (ref 4.05–5.2)
RBC # BLD AUTO: 2.76 M/UL (ref 4.05–5.2)
RBC # BLD AUTO: 2.81 M/UL (ref 4.05–5.2)
RBC # BLD AUTO: 2.82 M/UL (ref 4.05–5.2)
RBC # BLD AUTO: 2.83 M/UL (ref 4.05–5.2)
RBC # BLD AUTO: 2.85 M/UL (ref 4.05–5.2)
RBC # BLD AUTO: 2.85 M/UL (ref 4.05–5.2)
RBC # BLD AUTO: 2.86 M/UL (ref 4.05–5.2)
RBC # BLD AUTO: 2.88 M/UL (ref 4.05–5.2)
RBC # BLD AUTO: 2.9 M/UL (ref 4.05–5.2)
RBC # BLD AUTO: 2.93 M/UL (ref 4.05–5.25)
RBC # BLD AUTO: 2.96 M/UL (ref 4.05–5.2)
RBC # BLD AUTO: 2.96 M/UL (ref 4.05–5.25)
RBC # BLD AUTO: 2.97 M/UL (ref 4.05–5.2)
RBC # BLD AUTO: 3 M/UL (ref 4.05–5.2)
RBC # BLD AUTO: 3.03 M/UL (ref 4.05–5.2)
RBC # BLD AUTO: 3.08 M/UL (ref 4.05–5.2)
RBC # BLD AUTO: 3.08 M/UL (ref 4.05–5.2)
RBC # BLD AUTO: 3.11 M/UL (ref 4.05–5.2)
RBC # BLD AUTO: 3.11 M/UL (ref 4.05–5.2)
RBC # BLD AUTO: 3.13 M/UL (ref 4.05–5.2)
RBC # BLD AUTO: 3.18 M/UL (ref 4.05–5.2)
RBC # BLD AUTO: 3.21 M/UL (ref 4.05–5.2)
RBC # BLD AUTO: 3.3 M/UL (ref 4.05–5.25)
RBC # BLD AUTO: 3.4 M/UL (ref 4.05–5.25)
RBC # BLD AUTO: 3.51 M/UL (ref 4.05–5.25)
RBC #/AREA URNS HPF: ABNORMAL /HPF
RETICS # AUTO: 0.04 M/UL (ref 0.03–0.1)
RETICS/RBC NFR AUTO: 1.3 % (ref 0.3–2)
SAO2 % BLD: 100 % (ref 95–98)
SAO2 % BLD: 88 % (ref 95–98)
SAO2 % BLD: 95 % (ref 95–98)
SAO2 % BLD: 95 % (ref 95–98)
SAO2 % BLD: 96 % (ref 95–98)
SAO2 % BLD: 97 % (ref 95–98)
SAO2 % BLD: 98 % (ref 95–98)
SAO2 % BLD: 98 % (ref 95–98)
SAO2 % BLD: 99 % (ref 95–98)
SAO2 % BLD: 99 % (ref 95–98)
SAO2 % BLDV: 29 % (ref 65–88)
SAO2 % BLDV: 75 % (ref 65–88)
SERVICE CMNT-IMP: ABNORMAL
SERVICE CMNT-IMP: NORMAL
SERVICE CMNT-IMP: NORMAL
SODIUM BLD-SCNC: 136 MMOL/L (ref 136–145)
SODIUM SERPL-SCNC: 136 MMOL/L (ref 136–145)
SODIUM SERPL-SCNC: 139 MMOL/L (ref 136–145)
SODIUM SERPL-SCNC: 140 MMOL/L (ref 136–145)
SODIUM SERPL-SCNC: 141 MMOL/L (ref 136–145)
SODIUM SERPL-SCNC: 142 MMOL/L (ref 136–145)
SODIUM SERPL-SCNC: 143 MMOL/L (ref 136–145)
SODIUM SERPL-SCNC: 144 MMOL/L (ref 136–145)
SODIUM SERPL-SCNC: 144 MMOL/L (ref 136–145)
SODIUM SERPL-SCNC: 145 MMOL/L (ref 136–145)
SODIUM SERPL-SCNC: 146 MMOL/L (ref 136–145)
SODIUM UR-SCNC: 45 MMOL/L
SP GR UR REFRACTOMETRY: 1.01 (ref 1–1.02)
SP GR UR REFRACTOMETRY: 1.01 (ref 1–1.02)
SPECIMEN EXP DATE BLD: NORMAL
SPECIMEN TYPE: ABNORMAL
STATUS OF UNIT,%ST: NORMAL
TIBC SERPL-MCNC: 205 UG/DL (ref 250–450)
TIBC SERPL-MCNC: 239 UG/DL (ref 250–450)
TIBC SERPL-MCNC: 267 UG/DL (ref 250–450)
TIBC SERPL-MCNC: 284 UG/DL (ref 250–450)
TIBC SERPL-MCNC: 325 UG/DL (ref 250–450)
TIBC SERPL-MCNC: 396 UG/DL (ref 250–450)
TROPONIN I BLD-MCNC: 0.07 NG/ML (ref 0.02–0.05)
TROPONIN I SERPL-MCNC: 0.06 NG/ML (ref 0.02–0.05)
TROPONIN I SERPL-MCNC: 0.07 NG/ML (ref 0.02–0.05)
TROPONIN I SERPL-MCNC: 0.08 NG/ML (ref 0.02–0.05)
TSH SERPL DL<=0.005 MIU/L-ACNC: 1.79 UIU/ML (ref 0.36–3.74)
UNIT DIVISION, %UDIV: 0
UROBILINOGEN UR QL STRIP.AUTO: 0.2 EU/DL (ref 0.2–1)
UROBILINOGEN UR QL STRIP.AUTO: 1 EU/DL (ref 0.2–1)
VENTILATION MODE VENT: ABNORMAL
VENTRICULAR RATE, ECG03: 67 BPM
VENTRICULAR RATE, ECG03: 77 BPM
VENTRICULAR RATE, ECG03: 78 BPM
VENTRICULAR RATE, ECG03: 80 BPM
VENTRICULAR RATE, ECG03: 83 BPM
VIT B12 SERPL-MCNC: 963 PG/ML (ref 193–986)
VT SETTING VENT: 450 ML
VT SETTING VENT: 490 ML
WBC # BLD AUTO: 4.9 K/UL (ref 4.3–11.1)
WBC # BLD AUTO: 5 K/UL (ref 4.3–11.1)
WBC # BLD AUTO: 5.3 K/UL (ref 4.3–11.1)
WBC # BLD AUTO: 5.4 K/UL (ref 4.3–11.1)
WBC # BLD AUTO: 5.5 K/UL (ref 4.3–11.1)
WBC # BLD AUTO: 5.6 K/UL (ref 4.3–11.1)
WBC # BLD AUTO: 5.6 K/UL (ref 4.3–11.1)
WBC # BLD AUTO: 5.8 K/UL (ref 4.3–11.1)
WBC # BLD AUTO: 5.9 K/UL (ref 4.3–11.1)
WBC # BLD AUTO: 6 K/UL (ref 4.3–11.1)
WBC # BLD AUTO: 6.2 K/UL (ref 4.3–11.1)
WBC # BLD AUTO: 6.3 K/UL (ref 4.3–11.1)
WBC # BLD AUTO: 6.5 K/UL (ref 4.3–11.1)
WBC # BLD AUTO: 6.5 K/UL (ref 4.3–11.1)
WBC # BLD AUTO: 6.7 K/UL (ref 4.3–11.1)
WBC # BLD AUTO: 6.7 K/UL (ref 4.3–11.1)
WBC # BLD AUTO: 6.8 K/UL (ref 4.3–11.1)
WBC # BLD AUTO: 6.8 K/UL (ref 4.3–11.1)
WBC # BLD AUTO: 7 K/UL (ref 4.3–11.1)
WBC # BLD AUTO: 7.1 K/UL (ref 4.3–11.1)
WBC # BLD AUTO: 7.3 K/UL (ref 4.3–11.1)
WBC # BLD AUTO: 7.4 K/UL (ref 4.3–11.1)
WBC # BLD AUTO: 7.6 K/UL (ref 4.3–11.1)
WBC # BLD AUTO: 8.5 K/UL (ref 4.3–11.1)
WBC # BLD AUTO: 8.6 K/UL (ref 4.3–11.1)
WBC # BLD AUTO: 9.8 K/UL (ref 4.3–11.1)

## 2019-01-01 PROCEDURE — 3331090002 HH PPS REVENUE DEBIT

## 2019-01-01 PROCEDURE — 0DB98ZX EXCISION OF DUODENUM, VIA NATURAL OR ARTIFICIAL OPENING ENDOSCOPIC, DIAGNOSTIC: ICD-10-PCS | Performed by: INTERNAL MEDICINE

## 2019-01-01 PROCEDURE — 74011250637 HC RX REV CODE- 250/637: Performed by: INTERNAL MEDICINE

## 2019-01-01 PROCEDURE — 74011250636 HC RX REV CODE- 250/636: Performed by: INTERNAL MEDICINE

## 2019-01-01 PROCEDURE — 85730 THROMBOPLASTIN TIME PARTIAL: CPT

## 2019-01-01 PROCEDURE — 74011000250 HC RX REV CODE- 250: Performed by: NURSE PRACTITIONER

## 2019-01-01 PROCEDURE — G0299 HHS/HOSPICE OF RN EA 15 MIN: HCPCS

## 2019-01-01 PROCEDURE — 80048 BASIC METABOLIC PNL TOTAL CA: CPT

## 2019-01-01 PROCEDURE — 74011000250 HC RX REV CODE- 250: Performed by: INTERNAL MEDICINE

## 2019-01-01 PROCEDURE — 77010033678 HC OXYGEN DAILY

## 2019-01-01 PROCEDURE — 94760 N-INVAS EAR/PLS OXIMETRY 1: CPT

## 2019-01-01 PROCEDURE — 74011250637 HC RX REV CODE- 250/637: Performed by: HOSPITALIST

## 2019-01-01 PROCEDURE — 3331090001 HH PPS REVENUE CREDIT

## 2019-01-01 PROCEDURE — 74011000258 HC RX REV CODE- 258: Performed by: INTERNAL MEDICINE

## 2019-01-01 PROCEDURE — 80053 COMPREHEN METABOLIC PANEL: CPT

## 2019-01-01 PROCEDURE — 85027 COMPLETE CBC AUTOMATED: CPT

## 2019-01-01 PROCEDURE — 85018 HEMOGLOBIN: CPT

## 2019-01-01 PROCEDURE — 94640 AIRWAY INHALATION TREATMENT: CPT

## 2019-01-01 PROCEDURE — 82962 GLUCOSE BLOOD TEST: CPT

## 2019-01-01 PROCEDURE — 86644 CMV ANTIBODY: CPT

## 2019-01-01 PROCEDURE — 36415 COLL VENOUS BLD VENIPUNCTURE: CPT

## 2019-01-01 PROCEDURE — 74011250637 HC RX REV CODE- 250/637: Performed by: NURSE PRACTITIONER

## 2019-01-01 PROCEDURE — 99233 SBSQ HOSP IP/OBS HIGH 50: CPT | Performed by: INTERNAL MEDICINE

## 2019-01-01 PROCEDURE — 99214 OFFICE O/P EST MOD 30 MIN: CPT | Performed by: SURGERY

## 2019-01-01 PROCEDURE — 74011000250 HC RX REV CODE- 250: Performed by: HOSPITALIST

## 2019-01-01 PROCEDURE — 86923 COMPATIBILITY TEST ELECTRIC: CPT

## 2019-01-01 PROCEDURE — 74011250636 HC RX REV CODE- 250/636: Performed by: HOSPITALIST

## 2019-01-01 PROCEDURE — 99214 OFFICE O/P EST MOD 30 MIN: CPT

## 2019-01-01 PROCEDURE — 97110 THERAPEUTIC EXERCISES: CPT

## 2019-01-01 PROCEDURE — 65270000029 HC RM PRIVATE

## 2019-01-01 PROCEDURE — 97530 THERAPEUTIC ACTIVITIES: CPT

## 2019-01-01 PROCEDURE — 83880 ASSAY OF NATRIURETIC PEPTIDE: CPT

## 2019-01-01 PROCEDURE — 85610 PROTHROMBIN TIME: CPT

## 2019-01-01 PROCEDURE — C9113 INJ PANTOPRAZOLE SODIUM, VIA: HCPCS | Performed by: INTERNAL MEDICINE

## 2019-01-01 PROCEDURE — 74011250636 HC RX REV CODE- 250/636: Performed by: NURSE PRACTITIONER

## 2019-01-01 PROCEDURE — 96372 THER/PROPH/DIAG INJ SC/IM: CPT

## 2019-01-01 PROCEDURE — 5A1945Z RESPIRATORY VENTILATION, 24-96 CONSECUTIVE HOURS: ICD-10-PCS | Performed by: INTERNAL MEDICINE

## 2019-01-01 PROCEDURE — 97162 PT EVAL MOD COMPLEX 30 MIN: CPT

## 2019-01-01 PROCEDURE — 85025 COMPLETE CBC W/AUTO DIFF WBC: CPT

## 2019-01-01 PROCEDURE — 84484 ASSAY OF TROPONIN QUANT: CPT

## 2019-01-01 PROCEDURE — 3331090003 HH PPS REVENUE ADJ

## 2019-01-01 PROCEDURE — 400014 HH F/U

## 2019-01-01 PROCEDURE — 73090 X-RAY EXAM OF FOREARM: CPT

## 2019-01-01 PROCEDURE — 65660000000 HC RM CCU STEPDOWN

## 2019-01-01 PROCEDURE — 77030020257 HC SOL INJ SOD CL 0.45% 1000ML BG

## 2019-01-01 PROCEDURE — 80069 RENAL FUNCTION PANEL: CPT

## 2019-01-01 PROCEDURE — 36600 WITHDRAWAL OF ARTERIAL BLOOD: CPT

## 2019-01-01 PROCEDURE — 74011250636 HC RX REV CODE- 250/636

## 2019-01-01 PROCEDURE — 97161 PT EVAL LOW COMPLEX 20 MIN: CPT

## 2019-01-01 PROCEDURE — 82728 ASSAY OF FERRITIN: CPT

## 2019-01-01 PROCEDURE — 83540 ASSAY OF IRON: CPT

## 2019-01-01 PROCEDURE — 36416 COLLJ CAPILLARY BLOOD SPEC: CPT

## 2019-01-01 PROCEDURE — 84100 ASSAY OF PHOSPHORUS: CPT

## 2019-01-01 PROCEDURE — 83735 ASSAY OF MAGNESIUM: CPT

## 2019-01-01 PROCEDURE — 82803 BLOOD GASES ANY COMBINATION: CPT

## 2019-01-01 PROCEDURE — 400013 HH SOC

## 2019-01-01 PROCEDURE — 99222 1ST HOSP IP/OBS MODERATE 55: CPT | Performed by: PHYSICAL MEDICINE & REHABILITATION

## 2019-01-01 PROCEDURE — 77030027138 HC INCENT SPIROMETER -A

## 2019-01-01 PROCEDURE — 30233N1 TRANSFUSION OF NONAUTOLOGOUS RED BLOOD CELLS INTO PERIPHERAL VEIN, PERCUTANEOUS APPROACH: ICD-10-PCS | Performed by: NURSE PRACTITIONER

## 2019-01-01 PROCEDURE — 86900 BLOOD TYPING SEROLOGIC ABO: CPT

## 2019-01-01 PROCEDURE — 30233N1 TRANSFUSION OF NONAUTOLOGOUS RED BLOOD CELLS INTO PERIPHERAL VEIN, PERCUTANEOUS APPROACH: ICD-10-PCS | Performed by: INTERNAL MEDICINE

## 2019-01-01 PROCEDURE — 74011000250 HC RX REV CODE- 250

## 2019-01-01 PROCEDURE — 96361 HYDRATE IV INFUSION ADD-ON: CPT | Performed by: EMERGENCY MEDICINE

## 2019-01-01 PROCEDURE — 74011000258 HC RX REV CODE- 258: Performed by: EMERGENCY MEDICINE

## 2019-01-01 PROCEDURE — 74011000302 HC RX REV CODE- 302: Performed by: INTERNAL MEDICINE

## 2019-01-01 PROCEDURE — 97166 OT EVAL MOD COMPLEX 45 MIN: CPT

## 2019-01-01 PROCEDURE — P9040 RBC LEUKOREDUCED IRRADIATED: HCPCS

## 2019-01-01 PROCEDURE — 93005 ELECTROCARDIOGRAM TRACING: CPT | Performed by: INTERNAL MEDICINE

## 2019-01-01 PROCEDURE — 84443 ASSAY THYROID STIM HORMONE: CPT

## 2019-01-01 PROCEDURE — 73060 X-RAY EXAM OF HUMERUS: CPT

## 2019-01-01 PROCEDURE — G0158 HHC OT ASSISTANT EA 15: HCPCS

## 2019-01-01 PROCEDURE — 81003 URINALYSIS AUTO W/O SCOPE: CPT

## 2019-01-01 PROCEDURE — 77030027688 HC DRSG MEPILEX 16-48IN NO BORD MOLN -A

## 2019-01-01 PROCEDURE — 73030 X-RAY EXAM OF SHOULDER: CPT

## 2019-01-01 PROCEDURE — 0DBN8ZZ EXCISION OF SIGMOID COLON, VIA NATURAL OR ARTIFICIAL OPENING ENDOSCOPIC: ICD-10-PCS | Performed by: INTERNAL MEDICINE

## 2019-01-01 PROCEDURE — 77030020263 HC SOL INJ SOD CL0.9% LFCR 1000ML

## 2019-01-01 PROCEDURE — 71045 X-RAY EXAM CHEST 1 VIEW: CPT

## 2019-01-01 PROCEDURE — 93005 ELECTROCARDIOGRAM TRACING: CPT | Performed by: EMERGENCY MEDICINE

## 2019-01-01 PROCEDURE — 93005 ELECTROCARDIOGRAM TRACING: CPT | Performed by: HOSPITALIST

## 2019-01-01 PROCEDURE — 96375 TX/PRO/DX INJ NEW DRUG ADDON: CPT | Performed by: EMERGENCY MEDICINE

## 2019-01-01 PROCEDURE — 94002 VENT MGMT INPAT INIT DAY: CPT

## 2019-01-01 PROCEDURE — G0153 HHCP-SVS OF S/L PATH,EA 15MN: HCPCS

## 2019-01-01 PROCEDURE — 89220 SPUTUM SPECIMEN COLLECTION: CPT

## 2019-01-01 PROCEDURE — G0157 HHC PT ASSISTANT EA 15: HCPCS

## 2019-01-01 PROCEDURE — 84156 ASSAY OF PROTEIN URINE: CPT

## 2019-01-01 PROCEDURE — 84300 ASSAY OF URINE SODIUM: CPT

## 2019-01-01 PROCEDURE — 99231 SBSQ HOSP IP/OBS SF/LOW 25: CPT | Performed by: INTERNAL MEDICINE

## 2019-01-01 PROCEDURE — A6260 WOUND CLEANSER ANY TYPE/SIZE: HCPCS

## 2019-01-01 PROCEDURE — 36430 TRANSFUSION BLD/BLD COMPNT: CPT

## 2019-01-01 PROCEDURE — 77030039270 HC TU BLD FLTR CARD -A

## 2019-01-01 PROCEDURE — 74011000250 HC RX REV CODE- 250: Performed by: SURGERY

## 2019-01-01 PROCEDURE — 82607 VITAMIN B-12: CPT

## 2019-01-01 PROCEDURE — 94664 DEMO&/EVAL PT USE INHALER: CPT

## 2019-01-01 PROCEDURE — G0152 HHCP-SERV OF OT,EA 15 MIN: HCPCS

## 2019-01-01 PROCEDURE — 74011000302 HC RX REV CODE- 302: Performed by: NURSE PRACTITIONER

## 2019-01-01 PROCEDURE — 99233 SBSQ HOSP IP/OBS HIGH 50: CPT | Performed by: NURSE PRACTITIONER

## 2019-01-01 PROCEDURE — 0DB68ZX EXCISION OF STOMACH, VIA NATURAL OR ARTIFICIAL OPENING ENDOSCOPIC, DIAGNOSTIC: ICD-10-PCS | Performed by: INTERNAL MEDICINE

## 2019-01-01 PROCEDURE — 77030032490 HC SLV COMPR SCD KNE COVD -B

## 2019-01-01 PROCEDURE — 74011636637 HC RX REV CODE- 636/637: Performed by: EMERGENCY MEDICINE

## 2019-01-01 PROCEDURE — 74011250636 HC RX REV CODE- 250/636: Performed by: FAMILY MEDICINE

## 2019-01-01 PROCEDURE — 99223 1ST HOSP IP/OBS HIGH 75: CPT | Performed by: NURSE PRACTITIONER

## 2019-01-01 PROCEDURE — 99232 SBSQ HOSP IP/OBS MODERATE 35: CPT | Performed by: INTERNAL MEDICINE

## 2019-01-01 PROCEDURE — 74011250636 HC RX REV CODE- 250/636: Performed by: EMERGENCY MEDICINE

## 2019-01-01 PROCEDURE — 96366 THER/PROPH/DIAG IV INF ADDON: CPT | Performed by: EMERGENCY MEDICINE

## 2019-01-01 PROCEDURE — 96374 THER/PROPH/DIAG INJ IV PUSH: CPT | Performed by: EMERGENCY MEDICINE

## 2019-01-01 PROCEDURE — 76450000000

## 2019-01-01 PROCEDURE — 85046 RETICYTE/HGB CONCENTRATE: CPT

## 2019-01-01 PROCEDURE — 83605 ASSAY OF LACTIC ACID: CPT

## 2019-01-01 PROCEDURE — C8929 TTE W OR WO FOL WCON,DOPPLER: HCPCS

## 2019-01-01 PROCEDURE — 99356 PR PROLONGED SVC I/P OR OBS SETTING 1ST HOUR: CPT | Performed by: NURSE PRACTITIONER

## 2019-01-01 PROCEDURE — 74011000250 HC RX REV CODE- 250: Performed by: EMERGENCY MEDICINE

## 2019-01-01 PROCEDURE — 0DJD8ZZ INSPECTION OF LOWER INTESTINAL TRACT, VIA NATURAL OR ARTIFICIAL OPENING ENDOSCOPIC: ICD-10-PCS | Performed by: INTERNAL MEDICINE

## 2019-01-01 PROCEDURE — G0151 HHCP-SERV OF PT,EA 15 MIN: HCPCS

## 2019-01-01 PROCEDURE — 96365 THER/PROPH/DIAG IV INF INIT: CPT | Performed by: EMERGENCY MEDICINE

## 2019-01-01 PROCEDURE — 94003 VENT MGMT INPAT SUBQ DAY: CPT

## 2019-01-01 PROCEDURE — 65610000001 HC ROOM ICU GENERAL

## 2019-01-01 PROCEDURE — 88312 SPECIAL STAINS GROUP 1: CPT

## 2019-01-01 PROCEDURE — 74011250636 HC RX REV CODE- 250/636: Performed by: PHYSICIAN ASSISTANT

## 2019-01-01 PROCEDURE — 86580 TB INTRADERMAL TEST: CPT | Performed by: NURSE PRACTITIONER

## 2019-01-01 PROCEDURE — 77030012794 HC KT NSL CANN/HGH TRAN -A

## 2019-01-01 PROCEDURE — 70450 CT HEAD/BRAIN W/O DYE: CPT

## 2019-01-01 PROCEDURE — 77030020245 HC SOL INJ 5% D/0.9%NACL

## 2019-01-01 PROCEDURE — 76060000032 HC ANESTHESIA 0.5 TO 1 HR: Performed by: INTERNAL MEDICINE

## 2019-01-01 PROCEDURE — A6203 COMPOSITE DRSG <= 16 SQ IN: HCPCS

## 2019-01-01 PROCEDURE — 97535 SELF CARE MNGMENT TRAINING: CPT

## 2019-01-01 PROCEDURE — 87040 BLOOD CULTURE FOR BACTERIA: CPT

## 2019-01-01 PROCEDURE — 77030018667 ADMN ST IV BLD FENW -A

## 2019-01-01 PROCEDURE — 77030020253 HC SOL INJ D545NS .05 DEX .45 SAL

## 2019-01-01 PROCEDURE — 82550 ASSAY OF CK (CPK): CPT

## 2019-01-01 PROCEDURE — 74011636637 HC RX REV CODE- 636/637: Performed by: INTERNAL MEDICINE

## 2019-01-01 PROCEDURE — 74011250636 HC RX REV CODE- 250/636: Performed by: ANESTHESIOLOGY

## 2019-01-01 PROCEDURE — 99285 EMERGENCY DEPT VISIT HI MDM: CPT | Performed by: EMERGENCY MEDICINE

## 2019-01-01 PROCEDURE — 77030021593 HC FCPS BIOP ENDOSC BSC -A: Performed by: INTERNAL MEDICINE

## 2019-01-01 PROCEDURE — 76040000026: Performed by: INTERNAL MEDICINE

## 2019-01-01 PROCEDURE — 84145 PROCALCITONIN (PCT): CPT

## 2019-01-01 PROCEDURE — 88305 TISSUE EXAM BY PATHOLOGIST: CPT

## 2019-01-01 PROCEDURE — 99223 1ST HOSP IP/OBS HIGH 75: CPT | Performed by: INTERNAL MEDICINE

## 2019-01-01 PROCEDURE — 77030013991 HC SNR POLYP ENDOSC BSC -A: Performed by: INTERNAL MEDICINE

## 2019-01-01 PROCEDURE — 99284 EMERGENCY DEPT VISIT MOD MDM: CPT | Performed by: EMERGENCY MEDICINE

## 2019-01-01 PROCEDURE — 82746 ASSAY OF FOLIC ACID SERUM: CPT

## 2019-01-01 PROCEDURE — 86580 TB INTRADERMAL TEST: CPT | Performed by: INTERNAL MEDICINE

## 2019-01-01 PROCEDURE — A6216 NON-STERILE GAUZE<=16 SQ IN: HCPCS

## 2019-01-01 PROCEDURE — 82947 ASSAY GLUCOSE BLOOD QUANT: CPT

## 2019-01-01 PROCEDURE — 77030034849

## 2019-01-01 PROCEDURE — 84132 ASSAY OF SERUM POTASSIUM: CPT

## 2019-01-01 PROCEDURE — 76937 US GUIDE VASCULAR ACCESS: CPT

## 2019-01-01 PROCEDURE — 29580 STRAPPING UNNA BOOT: CPT

## 2019-01-01 PROCEDURE — 76775 US EXAM ABDO BACK WALL LIM: CPT

## 2019-01-01 PROCEDURE — 77030011256 HC DRSG MEPILEX <16IN NO BORD MOLN -A

## 2019-01-01 PROCEDURE — 82270 OCCULT BLOOD FECES: CPT

## 2019-01-01 PROCEDURE — 97597 DBRDMT OPN WND 1ST 20 CM/<: CPT

## 2019-01-01 PROCEDURE — 74011000258 HC RX REV CODE- 258: Performed by: NURSE PRACTITIONER

## 2019-01-01 PROCEDURE — 77030019605

## 2019-01-01 PROCEDURE — 80076 HEPATIC FUNCTION PANEL: CPT

## 2019-01-01 RX ORDER — FUROSEMIDE 10 MG/ML
40 INJECTION INTRAMUSCULAR; INTRAVENOUS 2 TIMES DAILY
Status: DISCONTINUED | OUTPATIENT
Start: 2019-01-01 | End: 2019-01-01

## 2019-01-01 RX ORDER — DIPHENHYDRAMINE HYDROCHLORIDE 50 MG/ML
12.5 INJECTION, SOLUTION INTRAMUSCULAR; INTRAVENOUS
Status: CANCELLED | OUTPATIENT
Start: 2019-01-01 | End: 2019-01-01

## 2019-01-01 RX ORDER — PROPOFOL 10 MG/ML
5-50 VIAL (ML) INTRAVENOUS
Status: DISCONTINUED | OUTPATIENT
Start: 2019-01-01 | End: 2019-01-01

## 2019-01-01 RX ORDER — SODIUM CHLORIDE 0.9 % (FLUSH) 0.9 %
5-40 SYRINGE (ML) INJECTION EVERY 8 HOURS
Status: DISCONTINUED | OUTPATIENT
Start: 2019-01-01 | End: 2019-01-01 | Stop reason: HOSPADM

## 2019-01-01 RX ORDER — LIDOCAINE HYDROCHLORIDE 10 MG/ML
0.1 INJECTION INFILTRATION; PERINEURAL AS NEEDED
Status: DISCONTINUED | OUTPATIENT
Start: 2019-01-01 | End: 2019-01-01 | Stop reason: HOSPADM

## 2019-01-01 RX ORDER — POTASSIUM CHLORIDE 20 MEQ/1
40 TABLET, EXTENDED RELEASE ORAL
Status: COMPLETED | OUTPATIENT
Start: 2019-01-01 | End: 2019-01-01

## 2019-01-01 RX ORDER — LIDOCAINE 4 G/100G
1 PATCH TOPICAL EVERY 24 HOURS
Status: DISCONTINUED | OUTPATIENT
Start: 2019-01-01 | End: 2019-01-01

## 2019-01-01 RX ORDER — ISOSORBIDE MONONITRATE 30 MG/1
30 TABLET, EXTENDED RELEASE ORAL DAILY
Status: DISCONTINUED | OUTPATIENT
Start: 2019-01-01 | End: 2019-01-01

## 2019-01-01 RX ORDER — SODIUM CHLORIDE 9 MG/ML
250 INJECTION, SOLUTION INTRAVENOUS AS NEEDED
Status: DISCONTINUED | OUTPATIENT
Start: 2019-01-01 | End: 2019-01-01 | Stop reason: HOSPADM

## 2019-01-01 RX ORDER — FUROSEMIDE 80 MG/1
80 TABLET ORAL DAILY
Qty: 90 TAB | Refills: 0 | Status: SHIPPED | OUTPATIENT
Start: 2019-01-01 | End: 2019-01-01 | Stop reason: SDUPTHER

## 2019-01-01 RX ORDER — SODIUM CHLORIDE 9 MG/ML
75 INJECTION, SOLUTION INTRAVENOUS CONTINUOUS
Status: DISCONTINUED | OUTPATIENT
Start: 2019-01-01 | End: 2019-01-01

## 2019-01-01 RX ORDER — WARFARIN 4 MG/1
4 TABLET ORAL EVERY EVENING
COMMUNITY
End: 2019-01-01 | Stop reason: DRUGHIGH

## 2019-01-01 RX ORDER — WARFARIN SODIUM 5 MG/1
5 TABLET ORAL ONCE
Status: COMPLETED | OUTPATIENT
Start: 2019-01-01 | End: 2019-01-01

## 2019-01-01 RX ORDER — HEPARIN SODIUM 5000 [USP'U]/ML
35 INJECTION, SOLUTION INTRAVENOUS; SUBCUTANEOUS ONCE
Status: COMPLETED | OUTPATIENT
Start: 2019-01-01 | End: 2019-01-01

## 2019-01-01 RX ORDER — SODIUM CHLORIDE 0.9 % (FLUSH) 0.9 %
5-40 SYRINGE (ML) INJECTION AS NEEDED
Status: DISCONTINUED | OUTPATIENT
Start: 2019-01-01 | End: 2019-01-01 | Stop reason: HOSPADM

## 2019-01-01 RX ORDER — ETOMIDATE 2 MG/ML
INJECTION INTRAVENOUS AS NEEDED
Status: DISCONTINUED | OUTPATIENT
Start: 2019-01-01 | End: 2019-01-01 | Stop reason: HOSPADM

## 2019-01-01 RX ORDER — SODIUM FERRIC GLUCONATE COMPLEX IN SUCROSE 12.5 MG/ML
125 INJECTION INTRAVENOUS ONCE
Status: DISCONTINUED | OUTPATIENT
Start: 2019-01-01 | End: 2019-01-01

## 2019-01-01 RX ORDER — CARVEDILOL 6.25 MG/1
6.25 TABLET ORAL 2 TIMES DAILY WITH MEALS
Status: DISCONTINUED | OUTPATIENT
Start: 2019-01-01 | End: 2019-01-01 | Stop reason: HOSPADM

## 2019-01-01 RX ORDER — WARFARIN SODIUM 5 MG/1
5 TABLET ORAL EVERY EVENING
Status: DISCONTINUED | OUTPATIENT
Start: 2019-01-01 | End: 2019-01-01 | Stop reason: HOSPADM

## 2019-01-01 RX ORDER — WARFARIN 2 MG/1
2 TABLET ORAL
Status: COMPLETED | OUTPATIENT
Start: 2019-01-01 | End: 2019-01-01

## 2019-01-01 RX ORDER — FUROSEMIDE 10 MG/ML
40 INJECTION INTRAMUSCULAR; INTRAVENOUS ONCE
Status: COMPLETED | OUTPATIENT
Start: 2019-01-01 | End: 2019-01-01

## 2019-01-01 RX ORDER — BUDESONIDE 0.5 MG/2ML
500 INHALANT ORAL
Status: DISCONTINUED | OUTPATIENT
Start: 2019-01-01 | End: 2019-01-01 | Stop reason: SDUPTHER

## 2019-01-01 RX ORDER — HEPARIN SODIUM 5000 [USP'U]/100ML
12-25 INJECTION, SOLUTION INTRAVENOUS
Status: DISCONTINUED | OUTPATIENT
Start: 2019-01-01 | End: 2019-01-01

## 2019-01-01 RX ORDER — ASPIRIN 81 MG/1
81 TABLET ORAL DAILY
Status: DISCONTINUED | OUTPATIENT
Start: 2019-01-01 | End: 2019-01-01 | Stop reason: HOSPADM

## 2019-01-01 RX ORDER — MIDAZOLAM HYDROCHLORIDE 1 MG/ML
2 INJECTION, SOLUTION INTRAMUSCULAR; INTRAVENOUS ONCE
Status: DISPENSED | OUTPATIENT
Start: 2019-01-01 | End: 2019-01-01

## 2019-01-01 RX ORDER — VITAMIN E 268 MG
400 CAPSULE ORAL DAILY
Status: DISCONTINUED | OUTPATIENT
Start: 2019-01-01 | End: 2019-01-01 | Stop reason: HOSPADM

## 2019-01-01 RX ORDER — SIMVASTATIN 20 MG/1
20 TABLET, FILM COATED ORAL
Status: DISCONTINUED | OUTPATIENT
Start: 2019-01-01 | End: 2019-01-01 | Stop reason: HOSPADM

## 2019-01-01 RX ORDER — ONDANSETRON 2 MG/ML
4 INJECTION INTRAMUSCULAR; INTRAVENOUS ONCE
Status: CANCELLED | OUTPATIENT
Start: 2019-01-01 | End: 2019-01-01

## 2019-01-01 RX ORDER — LIDOCAINE HYDROCHLORIDE 40 MG/ML
SOLUTION TOPICAL AS NEEDED
Status: DISCONTINUED | OUTPATIENT
Start: 2019-01-01 | End: 2019-01-01 | Stop reason: HOSPADM

## 2019-01-01 RX ORDER — MIDAZOLAM HYDROCHLORIDE 1 MG/ML
5 INJECTION, SOLUTION INTRAMUSCULAR; INTRAVENOUS ONCE
Status: COMPLETED | OUTPATIENT
Start: 2019-01-01 | End: 2019-01-01

## 2019-01-01 RX ORDER — CARVEDILOL 12.5 MG/1
12.5 TABLET ORAL 2 TIMES DAILY WITH MEALS
Qty: 60 TAB | Refills: 0 | Status: SHIPPED | OUTPATIENT
Start: 2019-01-01 | End: 2019-01-01 | Stop reason: SDUPTHER

## 2019-01-01 RX ORDER — CARVEDILOL 6.25 MG/1
6.25 TABLET ORAL 2 TIMES DAILY WITH MEALS
Status: DISCONTINUED | OUTPATIENT
Start: 2019-01-01 | End: 2019-01-01

## 2019-01-01 RX ORDER — PANTOPRAZOLE SODIUM 40 MG/1
40 TABLET, DELAYED RELEASE ORAL
Status: DISCONTINUED | OUTPATIENT
Start: 2019-01-01 | End: 2019-01-01 | Stop reason: HOSPADM

## 2019-01-01 RX ORDER — TRAMADOL HYDROCHLORIDE 50 MG/1
50 TABLET ORAL
Status: DISCONTINUED | OUTPATIENT
Start: 2019-01-01 | End: 2019-01-01 | Stop reason: HOSPADM

## 2019-01-01 RX ORDER — ONDANSETRON 2 MG/ML
4 INJECTION INTRAMUSCULAR; INTRAVENOUS
Status: DISCONTINUED | OUTPATIENT
Start: 2019-01-01 | End: 2019-01-01 | Stop reason: HOSPADM

## 2019-01-01 RX ORDER — ALBUTEROL SULFATE 0.83 MG/ML
2.5 SOLUTION RESPIRATORY (INHALATION)
Qty: 30 NEBULE | Refills: 0 | Status: SHIPPED | OUTPATIENT
Start: 2019-01-01

## 2019-01-01 RX ORDER — ALBUTEROL SULFATE 0.83 MG/ML
2.5 SOLUTION RESPIRATORY (INHALATION)
Status: DISCONTINUED | OUTPATIENT
Start: 2019-01-01 | End: 2019-01-01 | Stop reason: HOSPADM

## 2019-01-01 RX ORDER — FLUTICASONE PROPIONATE 50 MCG
2 SPRAY, SUSPENSION (ML) NASAL
Status: DISCONTINUED | OUTPATIENT
Start: 2019-01-01 | End: 2019-01-01 | Stop reason: HOSPADM

## 2019-01-01 RX ORDER — HEPARIN SODIUM 5000 [USP'U]/ML
INJECTION, SOLUTION INTRAVENOUS; SUBCUTANEOUS
Status: ACTIVE
Start: 2019-01-01 | End: 2019-01-01

## 2019-01-01 RX ORDER — SODIUM CHLORIDE, SODIUM LACTATE, POTASSIUM CHLORIDE, CALCIUM CHLORIDE 600; 310; 30; 20 MG/100ML; MG/100ML; MG/100ML; MG/100ML
1000 INJECTION, SOLUTION INTRAVENOUS CONTINUOUS
Status: DISCONTINUED | OUTPATIENT
Start: 2019-01-01 | End: 2019-01-01 | Stop reason: HOSPADM

## 2019-01-01 RX ORDER — DOCUSATE SODIUM 100 MG/1
100 CAPSULE, LIQUID FILLED ORAL DAILY
Status: DISCONTINUED | OUTPATIENT
Start: 2019-01-01 | End: 2019-01-01 | Stop reason: HOSPADM

## 2019-01-01 RX ORDER — ALBUTEROL SULFATE 0.83 MG/ML
2.5 SOLUTION RESPIRATORY (INHALATION)
Qty: 30 NEBULE | Refills: 0 | Status: SHIPPED | OUTPATIENT
Start: 2019-01-01 | End: 2019-01-01 | Stop reason: SDUPTHER

## 2019-01-01 RX ORDER — FUROSEMIDE 40 MG/1
40 TABLET ORAL ONCE
Status: COMPLETED | OUTPATIENT
Start: 2019-01-01 | End: 2019-01-01

## 2019-01-01 RX ORDER — TRAMADOL HYDROCHLORIDE 50 MG/1
50 TABLET ORAL
Qty: 10 TAB | Refills: 0 | Status: SHIPPED | OUTPATIENT
Start: 2019-01-01 | End: 2019-01-01

## 2019-01-01 RX ORDER — WARFARIN 1 MG/1
1 TABLET ORAL SEE ADMIN INSTRUCTIONS
Status: DISCONTINUED | OUTPATIENT
Start: 2019-01-01 | End: 2019-01-01

## 2019-01-01 RX ORDER — OXYCODONE HYDROCHLORIDE 5 MG/1
5 TABLET ORAL
Status: CANCELLED | OUTPATIENT
Start: 2019-01-01 | End: 2019-01-01

## 2019-01-01 RX ORDER — NYSTATIN 100000 [USP'U]/G
POWDER TOPICAL 2 TIMES DAILY
Qty: 1 BOTTLE | Refills: 0 | Status: SHIPPED | OUTPATIENT
Start: 2019-01-01

## 2019-01-01 RX ORDER — LORAZEPAM 0.5 MG/1
0.5 TABLET ORAL
Status: DISCONTINUED | OUTPATIENT
Start: 2019-01-01 | End: 2019-01-01 | Stop reason: HOSPADM

## 2019-01-01 RX ORDER — PROPOFOL 10 MG/ML
INJECTION, EMULSION INTRAVENOUS
Status: DISCONTINUED | OUTPATIENT
Start: 2019-01-01 | End: 2019-01-01 | Stop reason: HOSPADM

## 2019-01-01 RX ORDER — HEPARIN SODIUM 5000 [USP'U]/ML
INJECTION, SOLUTION INTRAVENOUS; SUBCUTANEOUS
Status: COMPLETED
Start: 2019-01-01 | End: 2019-01-01

## 2019-01-01 RX ORDER — POLYETHYLENE GLYCOL 3350 17 G/17G
255 POWDER, FOR SOLUTION ORAL ONCE
Status: COMPLETED | OUTPATIENT
Start: 2019-01-01 | End: 2019-01-01

## 2019-01-01 RX ORDER — TRAZODONE HYDROCHLORIDE 50 MG/1
50 TABLET ORAL
Status: DISCONTINUED | OUTPATIENT
Start: 2019-01-01 | End: 2019-01-01 | Stop reason: HOSPADM

## 2019-01-01 RX ORDER — BISACODYL 5 MG
10 TABLET, DELAYED RELEASE (ENTERIC COATED) ORAL ONCE
Status: COMPLETED | OUTPATIENT
Start: 2019-01-01 | End: 2019-01-01

## 2019-01-01 RX ORDER — FERROUS GLUCONATE 324(38)MG
1 TABLET ORAL 2 TIMES DAILY WITH MEALS
Status: DISCONTINUED | OUTPATIENT
Start: 2019-01-01 | End: 2019-01-01 | Stop reason: HOSPADM

## 2019-01-01 RX ORDER — WARFARIN SODIUM 5 MG/1
5 TABLET ORAL EVERY EVENING
Status: DISCONTINUED | OUTPATIENT
Start: 2019-01-01 | End: 2019-01-01

## 2019-01-01 RX ORDER — DOCUSATE SODIUM 50 MG/5ML
50 LIQUID ORAL DAILY
Status: DISCONTINUED | OUTPATIENT
Start: 2019-01-01 | End: 2019-01-01

## 2019-01-01 RX ORDER — DEXTROSE 50 % IN WATER (D50W) INTRAVENOUS SYRINGE
50
Status: COMPLETED | OUTPATIENT
Start: 2019-01-01 | End: 2019-01-01

## 2019-01-01 RX ORDER — DEXTROSE MONOHYDRATE AND SODIUM CHLORIDE 5; .9 G/100ML; G/100ML
75 INJECTION, SOLUTION INTRAVENOUS CONTINUOUS
Status: DISCONTINUED | OUTPATIENT
Start: 2019-01-01 | End: 2019-01-01

## 2019-01-01 RX ORDER — ALBUTEROL SULFATE 0.83 MG/ML
2.5 SOLUTION RESPIRATORY (INHALATION)
Status: DISCONTINUED | OUTPATIENT
Start: 2019-01-01 | End: 2019-01-01

## 2019-01-01 RX ORDER — LORAZEPAM 2 MG/ML
2 INJECTION INTRAMUSCULAR
Status: DISCONTINUED | OUTPATIENT
Start: 2019-01-01 | End: 2019-01-01

## 2019-01-01 RX ORDER — WARFARIN 1 MG/1
1 TABLET ORAL EVERY EVENING
Status: DISCONTINUED | OUTPATIENT
Start: 2019-01-01 | End: 2019-01-01

## 2019-01-01 RX ORDER — WARFARIN SODIUM 5 MG/1
5 TABLET ORAL
Status: DISCONTINUED | OUTPATIENT
Start: 2019-01-01 | End: 2019-01-01

## 2019-01-01 RX ORDER — IPRATROPIUM BROMIDE AND ALBUTEROL SULFATE 2.5; .5 MG/3ML; MG/3ML
SOLUTION RESPIRATORY (INHALATION)
Status: COMPLETED
Start: 2019-01-01 | End: 2019-01-01

## 2019-01-01 RX ORDER — SODIUM CHLORIDE 9 MG/ML
250 INJECTION, SOLUTION INTRAVENOUS AS NEEDED
Status: DISCONTINUED | OUTPATIENT
Start: 2019-01-01 | End: 2019-01-01 | Stop reason: SDUPTHER

## 2019-01-01 RX ORDER — POLYETHYLENE GLYCOL 3350 17 G/17G
17 POWDER, FOR SOLUTION ORAL 2 TIMES DAILY
Status: DISCONTINUED | OUTPATIENT
Start: 2019-01-01 | End: 2019-01-01 | Stop reason: HOSPADM

## 2019-01-01 RX ORDER — SODIUM CHLORIDE, SODIUM LACTATE, POTASSIUM CHLORIDE, CALCIUM CHLORIDE 600; 310; 30; 20 MG/100ML; MG/100ML; MG/100ML; MG/100ML
100 INJECTION, SOLUTION INTRAVENOUS CONTINUOUS
Status: CANCELLED | OUTPATIENT
Start: 2019-01-01

## 2019-01-01 RX ORDER — NALOXONE HYDROCHLORIDE 0.4 MG/ML
0.1 INJECTION, SOLUTION INTRAMUSCULAR; INTRAVENOUS; SUBCUTANEOUS AS NEEDED
Status: CANCELLED | OUTPATIENT
Start: 2019-01-01

## 2019-01-01 RX ORDER — FUROSEMIDE 10 MG/ML
40 INJECTION INTRAMUSCULAR; INTRAVENOUS DAILY
Status: DISCONTINUED | OUTPATIENT
Start: 2019-01-01 | End: 2019-01-02

## 2019-01-01 RX ORDER — CARVEDILOL 12.5 MG/1
12.5 TABLET ORAL 2 TIMES DAILY WITH MEALS
Status: DISCONTINUED | OUTPATIENT
Start: 2019-01-01 | End: 2019-01-01 | Stop reason: HOSPADM

## 2019-01-01 RX ORDER — FACIAL-BODY WIPES
10 EACH TOPICAL ONCE
Status: COMPLETED | OUTPATIENT
Start: 2019-01-01 | End: 2019-01-01

## 2019-01-01 RX ORDER — BUDESONIDE 0.5 MG/2ML
500 INHALANT ORAL
Status: DISCONTINUED | OUTPATIENT
Start: 2019-01-01 | End: 2019-01-01 | Stop reason: HOSPADM

## 2019-01-01 RX ORDER — DIPHENHYDRAMINE HCL 25 MG
25 CAPSULE ORAL
Status: DISCONTINUED | OUTPATIENT
Start: 2019-01-01 | End: 2019-01-01 | Stop reason: HOSPADM

## 2019-01-01 RX ORDER — WARFARIN 2.5 MG/1
2.5 TABLET ORAL DAILY
Qty: 30 TAB | Refills: 0 | Status: SHIPPED
Start: 2019-01-01 | End: 2019-01-01

## 2019-01-01 RX ORDER — LIDOCAINE 4 G/100G
1 PATCH TOPICAL EVERY 24 HOURS
Status: DISCONTINUED | OUTPATIENT
Start: 2019-01-01 | End: 2019-01-01 | Stop reason: HOSPADM

## 2019-01-01 RX ORDER — FUROSEMIDE 10 MG/ML
40 INJECTION INTRAMUSCULAR; INTRAVENOUS
Status: COMPLETED | OUTPATIENT
Start: 2019-01-01 | End: 2019-01-01

## 2019-01-01 RX ORDER — PANTOPRAZOLE SODIUM 40 MG/1
40 TABLET, DELAYED RELEASE ORAL DAILY
Qty: 30 TAB | Refills: 0 | Status: SHIPPED | OUTPATIENT
Start: 2019-01-01 | End: 2019-01-01

## 2019-01-01 RX ORDER — WARFARIN 2 MG/1
4 TABLET ORAL EVERY EVENING
Status: DISCONTINUED | OUTPATIENT
Start: 2019-01-01 | End: 2019-01-01 | Stop reason: HOSPADM

## 2019-01-01 RX ORDER — ACETAMINOPHEN 325 MG/1
650 TABLET ORAL
Status: DISCONTINUED | OUTPATIENT
Start: 2019-01-01 | End: 2019-01-01 | Stop reason: HOSPADM

## 2019-01-01 RX ORDER — FUROSEMIDE 10 MG/ML
40 INJECTION INTRAMUSCULAR; INTRAVENOUS 2 TIMES DAILY
Status: DISCONTINUED | OUTPATIENT
Start: 2019-01-01 | End: 2019-01-01 | Stop reason: HOSPADM

## 2019-01-01 RX ORDER — DIPHENHYDRAMINE HCL 25 MG
25 CAPSULE ORAL
Status: DISCONTINUED | OUTPATIENT
Start: 2019-01-01 | End: 2019-01-01 | Stop reason: SDUPTHER

## 2019-01-01 RX ORDER — PANTOPRAZOLE SODIUM 40 MG/1
40 TABLET, DELAYED RELEASE ORAL DAILY
Status: DISCONTINUED | OUTPATIENT
Start: 2019-01-01 | End: 2019-01-01 | Stop reason: HOSPADM

## 2019-01-01 RX ORDER — NYSTATIN 100000 [USP'U]/G
POWDER TOPICAL 2 TIMES DAILY
Status: DISCONTINUED | OUTPATIENT
Start: 2019-01-01 | End: 2019-01-01 | Stop reason: HOSPADM

## 2019-01-01 RX ORDER — LIDOCAINE 4 G/100G
2 PATCH TOPICAL EVERY 24 HOURS
Status: DISCONTINUED | OUTPATIENT
Start: 2019-01-01 | End: 2019-01-01 | Stop reason: HOSPADM

## 2019-01-01 RX ORDER — SERTRALINE HYDROCHLORIDE 50 MG/1
50 TABLET, FILM COATED ORAL DAILY
Status: DISCONTINUED | OUTPATIENT
Start: 2019-01-01 | End: 2019-01-01 | Stop reason: HOSPADM

## 2019-01-01 RX ORDER — LANOLIN ALCOHOL/MO/W.PET/CERES
800 CREAM (GRAM) TOPICAL ONCE
Status: COMPLETED | OUTPATIENT
Start: 2019-01-01 | End: 2019-01-01

## 2019-01-01 RX ORDER — FUROSEMIDE 10 MG/ML
40 INJECTION INTRAMUSCULAR; INTRAVENOUS EVERY 12 HOURS
Status: DISCONTINUED | OUTPATIENT
Start: 2019-01-01 | End: 2019-01-01

## 2019-01-01 RX ORDER — ALBUTEROL SULFATE 0.83 MG/ML
2.5 SOLUTION RESPIRATORY (INHALATION) AS NEEDED
Status: CANCELLED | OUTPATIENT
Start: 2019-01-01

## 2019-01-01 RX ORDER — WARFARIN 1 MG/1
2.5 TABLET ORAL EVERY EVENING
Status: DISCONTINUED | OUTPATIENT
Start: 2019-01-01 | End: 2019-01-01

## 2019-01-01 RX ORDER — GUAIFENESIN 600 MG/1
1200 TABLET, EXTENDED RELEASE ORAL EVERY 12 HOURS
Status: DISCONTINUED | OUTPATIENT
Start: 2019-01-01 | End: 2019-01-01 | Stop reason: HOSPADM

## 2019-01-01 RX ORDER — NALOXONE HYDROCHLORIDE 0.4 MG/ML
0.4 INJECTION, SOLUTION INTRAMUSCULAR; INTRAVENOUS; SUBCUTANEOUS AS NEEDED
Status: DISCONTINUED | OUTPATIENT
Start: 2019-01-01 | End: 2019-01-01 | Stop reason: HOSPADM

## 2019-01-01 RX ORDER — HYDROCODONE BITARTRATE AND ACETAMINOPHEN 5; 325 MG/1; MG/1
1 TABLET ORAL
Status: DISCONTINUED | OUTPATIENT
Start: 2019-01-01 | End: 2019-01-01 | Stop reason: HOSPADM

## 2019-01-01 RX ORDER — DIPHENHYDRAMINE HCL 25 MG
25 CAPSULE ORAL
Status: DISCONTINUED | OUTPATIENT
Start: 2019-01-01 | End: 2019-01-01

## 2019-01-01 RX ORDER — MIDAZOLAM HYDROCHLORIDE 1 MG/ML
2 INJECTION, SOLUTION INTRAMUSCULAR; INTRAVENOUS
Status: DISPENSED | OUTPATIENT
Start: 2019-01-01 | End: 2019-01-01

## 2019-01-01 RX ORDER — HYDROMORPHONE HYDROCHLORIDE 2 MG/ML
0.5 INJECTION, SOLUTION INTRAMUSCULAR; INTRAVENOUS; SUBCUTANEOUS
Status: CANCELLED | OUTPATIENT
Start: 2019-01-01

## 2019-01-01 RX ORDER — HEPARIN SODIUM 5000 [USP'U]/ML
5000 INJECTION, SOLUTION INTRAVENOUS; SUBCUTANEOUS EVERY 8 HOURS
Status: DISCONTINUED | OUTPATIENT
Start: 2019-01-01 | End: 2019-01-01

## 2019-01-01 RX ORDER — FUROSEMIDE 10 MG/ML
40 INJECTION INTRAMUSCULAR; INTRAVENOUS ONCE
Status: DISCONTINUED | OUTPATIENT
Start: 2019-01-01 | End: 2019-01-01

## 2019-01-01 RX ORDER — CALCIUM GLUCONATE 94 MG/ML
1 INJECTION, SOLUTION INTRAVENOUS
Status: COMPLETED | OUTPATIENT
Start: 2019-01-01 | End: 2019-01-01

## 2019-01-01 RX ORDER — FENTANYL CITRATE 50 UG/ML
100 INJECTION, SOLUTION INTRAMUSCULAR; INTRAVENOUS ONCE
Status: DISPENSED | OUTPATIENT
Start: 2019-01-01 | End: 2019-01-01

## 2019-01-01 RX ORDER — WARFARIN 2 MG/1
4 TABLET ORAL EVERY EVENING
Status: DISCONTINUED | OUTPATIENT
Start: 2019-01-01 | End: 2019-01-01

## 2019-01-01 RX ORDER — SODIUM CHLORIDE, SODIUM LACTATE, POTASSIUM CHLORIDE, CALCIUM CHLORIDE 600; 310; 30; 20 MG/100ML; MG/100ML; MG/100ML; MG/100ML
100 INJECTION, SOLUTION INTRAVENOUS CONTINUOUS
Status: DISCONTINUED | OUTPATIENT
Start: 2019-01-01 | End: 2019-01-01

## 2019-01-01 RX ORDER — WARFARIN 1 MG/1
1 TABLET ORAL
Status: COMPLETED | OUTPATIENT
Start: 2019-01-01 | End: 2019-01-01

## 2019-01-01 RX ORDER — DOCUSATE SODIUM 100 MG/1
100 CAPSULE, LIQUID FILLED ORAL AS NEEDED
Status: DISCONTINUED | OUTPATIENT
Start: 2019-01-01 | End: 2019-01-01 | Stop reason: HOSPADM

## 2019-01-01 RX ORDER — LIDOCAINE 4 G/100G
PATCH TOPICAL
Qty: 10 PATCH | Refills: 0 | Status: SHIPPED | OUTPATIENT
Start: 2019-01-01

## 2019-01-01 RX ORDER — FUROSEMIDE 40 MG/1
80 TABLET ORAL DAILY
Status: DISCONTINUED | OUTPATIENT
Start: 2019-01-01 | End: 2019-01-01 | Stop reason: HOSPADM

## 2019-01-01 RX ORDER — HEPARIN SODIUM 5000 [USP'U]/ML
60 INJECTION, SOLUTION INTRAVENOUS; SUBCUTANEOUS ONCE
Status: COMPLETED | OUTPATIENT
Start: 2019-01-01 | End: 2019-01-01

## 2019-01-01 RX ORDER — HEPARIN SODIUM 5000 [USP'U]/ML
4000 INJECTION, SOLUTION INTRAVENOUS; SUBCUTANEOUS ONCE
Status: COMPLETED | OUTPATIENT
Start: 2019-01-01 | End: 2019-01-01

## 2019-01-01 RX ORDER — SODIUM CHLORIDE 450 MG/100ML
100 INJECTION, SOLUTION INTRAVENOUS CONTINUOUS
Status: DISCONTINUED | OUTPATIENT
Start: 2019-01-01 | End: 2019-01-01

## 2019-01-01 RX ORDER — BISACODYL 5 MG
10 TABLET, DELAYED RELEASE (ENTERIC COATED) ORAL DAILY PRN
Status: DISCONTINUED | OUTPATIENT
Start: 2019-01-01 | End: 2019-01-01 | Stop reason: HOSPADM

## 2019-01-01 RX ORDER — OXYCODONE HYDROCHLORIDE 5 MG/1
10 TABLET ORAL
Status: CANCELLED | OUTPATIENT
Start: 2019-01-01 | End: 2019-01-01

## 2019-01-01 RX ORDER — SILVER NITRATE 38.21; 12.74 MG/1; MG/1
1 STICK TOPICAL ONCE
Status: COMPLETED | OUTPATIENT
Start: 2019-01-01 | End: 2019-01-01

## 2019-01-01 RX ORDER — FUROSEMIDE 20 MG/1
40 TABLET ORAL DAILY
Status: DISCONTINUED | OUTPATIENT
Start: 2019-01-01 | End: 2019-01-01 | Stop reason: HOSPADM

## 2019-01-01 RX ADMIN — ASPIRIN 81 MG: 81 TABLET ORAL at 09:29

## 2019-01-01 RX ADMIN — PANTOPRAZOLE SODIUM 40 MG: 40 INJECTION, POWDER, FOR SOLUTION INTRAVENOUS at 22:04

## 2019-01-01 RX ADMIN — Medication 10 ML: at 21:14

## 2019-01-01 RX ADMIN — ALBUTEROL SULFATE 2.5 MG: 2.5 SOLUTION RESPIRATORY (INHALATION) at 16:13

## 2019-01-01 RX ADMIN — TRAZODONE HYDROCHLORIDE 50 MG: 50 TABLET ORAL at 21:33

## 2019-01-01 RX ADMIN — HEPARIN SODIUM 6 UNITS/KG/HR: 5000 INJECTION, SOLUTION INTRAVENOUS at 06:55

## 2019-01-01 RX ADMIN — SERTRALINE HYDROCHLORIDE 50 MG: 50 TABLET ORAL at 08:47

## 2019-01-01 RX ADMIN — BUDESONIDE 500 MCG: 0.5 INHALANT RESPIRATORY (INHALATION) at 07:40

## 2019-01-01 RX ADMIN — ALBUTEROL SULFATE 2.5 MG: 2.5 SOLUTION RESPIRATORY (INHALATION) at 04:29

## 2019-01-01 RX ADMIN — CARVEDILOL 6.25 MG: 6.25 TABLET, FILM COATED ORAL at 09:07

## 2019-01-01 RX ADMIN — ETOMIDATE 4 MG: 2 INJECTION INTRAVENOUS at 14:14

## 2019-01-01 RX ADMIN — FERROUS GLUCONATE 1 TABLET: 324 TABLET ORAL at 18:07

## 2019-01-01 RX ADMIN — PANTOPRAZOLE SODIUM 40 MG: 40 TABLET, DELAYED RELEASE ORAL at 09:31

## 2019-01-01 RX ADMIN — Medication 10 ML: at 13:19

## 2019-01-01 RX ADMIN — PANTOPRAZOLE SODIUM 40 MG: 40 TABLET, DELAYED RELEASE ORAL at 08:52

## 2019-01-01 RX ADMIN — TRAMADOL HYDROCHLORIDE 50 MG: 50 TABLET, FILM COATED ORAL at 21:41

## 2019-01-01 RX ADMIN — ALBUTEROL SULFATE 2.5 MG: 2.5 SOLUTION RESPIRATORY (INHALATION) at 23:10

## 2019-01-01 RX ADMIN — BUDESONIDE 500 MCG: 0.5 INHALANT RESPIRATORY (INHALATION) at 20:09

## 2019-01-01 RX ADMIN — PATIROMER 8.4 G: 8.4 POWDER, FOR SUSPENSION ORAL at 08:48

## 2019-01-01 RX ADMIN — PANTOPRAZOLE SODIUM 40 MG: 40 TABLET, DELAYED RELEASE ORAL at 08:28

## 2019-01-01 RX ADMIN — WARFARIN SODIUM 3 MG: 2 TABLET ORAL at 17:34

## 2019-01-01 RX ADMIN — Medication 10 ML: at 14:00

## 2019-01-01 RX ADMIN — ALBUTEROL SULFATE 2.5 MG: 2.5 SOLUTION RESPIRATORY (INHALATION) at 10:08

## 2019-01-01 RX ADMIN — SPIRONOLACTONE 25 MG: 25 TABLET, FILM COATED ORAL at 09:26

## 2019-01-01 RX ADMIN — SERTRALINE HYDROCHLORIDE 50 MG: 50 TABLET ORAL at 10:49

## 2019-01-01 RX ADMIN — MIDAZOLAM 1 MG/HR: 5 INJECTION INTRAMUSCULAR; INTRAVENOUS at 09:52

## 2019-01-01 RX ADMIN — ALBUTEROL SULFATE 2.5 MG: 2.5 SOLUTION RESPIRATORY (INHALATION) at 20:15

## 2019-01-01 RX ADMIN — POLYETHYLENE GLYCOL 3350 255 G: 17 POWDER, FOR SOLUTION ORAL at 18:32

## 2019-01-01 RX ADMIN — SERTRALINE HYDROCHLORIDE 50 MG: 50 TABLET ORAL at 09:01

## 2019-01-01 RX ADMIN — CALCIUM GLUCONATE 1 G: 98 INJECTION, SOLUTION INTRAVENOUS at 16:57

## 2019-01-01 RX ADMIN — ALBUTEROL SULFATE 2.5 MG: 2.5 SOLUTION RESPIRATORY (INHALATION) at 14:28

## 2019-01-01 RX ADMIN — SERTRALINE HYDROCHLORIDE 50 MG: 50 TABLET ORAL at 08:00

## 2019-01-01 RX ADMIN — SIMVASTATIN 20 MG: 20 TABLET, FILM COATED ORAL at 21:33

## 2019-01-01 RX ADMIN — PANTOPRAZOLE SODIUM 40 MG: 40 INJECTION, POWDER, FOR SOLUTION INTRAVENOUS at 09:08

## 2019-01-01 RX ADMIN — GUAIFENESIN 1200 MG: 600 TABLET, EXTENDED RELEASE ORAL at 21:13

## 2019-01-01 RX ADMIN — TRAMADOL HYDROCHLORIDE 50 MG: 50 TABLET, FILM COATED ORAL at 11:04

## 2019-01-01 RX ADMIN — ASPIRIN 81 MG: 81 TABLET, COATED ORAL at 09:00

## 2019-01-01 RX ADMIN — Medication 5 ML: at 22:00

## 2019-01-01 RX ADMIN — GUAIFENESIN 1200 MG: 600 TABLET, EXTENDED RELEASE ORAL at 09:13

## 2019-01-01 RX ADMIN — CARVEDILOL 6.25 MG: 6.25 TABLET, FILM COATED ORAL at 18:32

## 2019-01-01 RX ADMIN — Medication 10 ML: at 05:46

## 2019-01-01 RX ADMIN — ASPIRIN 81 MG: 81 TABLET ORAL at 08:21

## 2019-01-01 RX ADMIN — WARFARIN SODIUM 4 MG: 2 TABLET ORAL at 17:05

## 2019-01-01 RX ADMIN — ALBUTEROL SULFATE 2.5 MG: 2.5 SOLUTION RESPIRATORY (INHALATION) at 19:54

## 2019-01-01 RX ADMIN — FUROSEMIDE 40 MG: 20 TABLET ORAL at 09:08

## 2019-01-01 RX ADMIN — FERROUS GLUCONATE 1 TABLET: 324 TABLET ORAL at 17:03

## 2019-01-01 RX ADMIN — EPOETIN ALFA-EPBX 60000 UNITS: 40000 INJECTION, SOLUTION INTRAVENOUS; SUBCUTANEOUS at 14:20

## 2019-01-01 RX ADMIN — WARFARIN SODIUM 2.5 MG: 2.5 TABLET ORAL at 21:54

## 2019-01-01 RX ADMIN — GUAIFENESIN 1200 MG: 600 TABLET, EXTENDED RELEASE ORAL at 08:52

## 2019-01-01 RX ADMIN — Medication 10 ML: at 15:52

## 2019-01-01 RX ADMIN — TRAMADOL HYDROCHLORIDE 50 MG: 50 TABLET, FILM COATED ORAL at 23:46

## 2019-01-01 RX ADMIN — FERROUS GLUCONATE 1 TABLET: 324 TABLET ORAL at 08:29

## 2019-01-01 RX ADMIN — DOCUSATE SODIUM 100 MG: 100 CAPSULE, LIQUID FILLED ORAL at 09:11

## 2019-01-01 RX ADMIN — CARVEDILOL 12.5 MG: 12.5 TABLET, FILM COATED ORAL at 17:29

## 2019-01-01 RX ADMIN — SERTRALINE HYDROCHLORIDE 50 MG: 50 TABLET ORAL at 09:18

## 2019-01-01 RX ADMIN — PANTOPRAZOLE SODIUM 40 MG: 40 INJECTION, POWDER, FOR SOLUTION INTRAVENOUS at 07:41

## 2019-01-01 RX ADMIN — ALBUTEROL SULFATE 2.5 MG: 2.5 SOLUTION RESPIRATORY (INHALATION) at 01:15

## 2019-01-01 RX ADMIN — ALBUTEROL SULFATE 2.5 MG: 2.5 SOLUTION RESPIRATORY (INHALATION) at 07:15

## 2019-01-01 RX ADMIN — ALBUTEROL SULFATE 2.5 MG: 2.5 SOLUTION RESPIRATORY (INHALATION) at 00:00

## 2019-01-01 RX ADMIN — CARVEDILOL 6.25 MG: 6.25 TABLET, FILM COATED ORAL at 17:03

## 2019-01-01 RX ADMIN — DOCUSATE SODIUM 100 MG: 100 CAPSULE, LIQUID FILLED ORAL at 08:25

## 2019-01-01 RX ADMIN — Medication 10 ML: at 21:31

## 2019-01-01 RX ADMIN — VITAMIN D, TAB 1000IU (100/BT) 2000 UNITS: 25 TAB at 09:25

## 2019-01-01 RX ADMIN — SERTRALINE HYDROCHLORIDE 50 MG: 50 TABLET ORAL at 10:00

## 2019-01-01 RX ADMIN — DOCUSATE SODIUM 100 MG: 100 CAPSULE, LIQUID FILLED ORAL at 08:38

## 2019-01-01 RX ADMIN — PANTOPRAZOLE SODIUM 40 MG: 40 TABLET, DELAYED RELEASE ORAL at 09:12

## 2019-01-01 RX ADMIN — TRAMADOL HYDROCHLORIDE 50 MG: 50 TABLET, FILM COATED ORAL at 07:45

## 2019-01-01 RX ADMIN — CARVEDILOL 6.25 MG: 6.25 TABLET, FILM COATED ORAL at 09:11

## 2019-01-01 RX ADMIN — CARVEDILOL 6.25 MG: 6.25 TABLET, FILM COATED ORAL at 17:28

## 2019-01-01 RX ADMIN — CARVEDILOL 12.5 MG: 12.5 TABLET, FILM COATED ORAL at 09:31

## 2019-01-01 RX ADMIN — SERTRALINE HYDROCHLORIDE 50 MG: 50 TABLET ORAL at 09:59

## 2019-01-01 RX ADMIN — HYDROCODONE BITARTRATE AND ACETAMINOPHEN 1 TABLET: 5; 325 TABLET ORAL at 05:15

## 2019-01-01 RX ADMIN — FUROSEMIDE 80 MG: 40 TABLET ORAL at 08:38

## 2019-01-01 RX ADMIN — FUROSEMIDE 80 MG: 40 TABLET ORAL at 08:40

## 2019-01-01 RX ADMIN — BUDESONIDE 500 MCG: 0.5 INHALANT RESPIRATORY (INHALATION) at 07:44

## 2019-01-01 RX ADMIN — ALBUTEROL SULFATE 2.5 MG: 2.5 SOLUTION RESPIRATORY (INHALATION) at 19:00

## 2019-01-01 RX ADMIN — ALBUTEROL SULFATE 2.5 MG: 2.5 SOLUTION RESPIRATORY (INHALATION) at 02:16

## 2019-01-01 RX ADMIN — DOCUSATE SODIUM 100 MG: 100 CAPSULE, LIQUID FILLED ORAL at 08:35

## 2019-01-01 RX ADMIN — WARFARIN SODIUM 2.5 MG: 1 TABLET ORAL at 17:03

## 2019-01-01 RX ADMIN — ALBUTEROL SULFATE 2.5 MG: 2.5 SOLUTION RESPIRATORY (INHALATION) at 23:03

## 2019-01-01 RX ADMIN — TRAZODONE HYDROCHLORIDE 50 MG: 50 TABLET ORAL at 20:31

## 2019-01-01 RX ADMIN — NYSTATIN: 100000 POWDER TOPICAL at 17:30

## 2019-01-01 RX ADMIN — PANTOPRAZOLE SODIUM 40 MG: 40 TABLET, DELAYED RELEASE ORAL at 08:10

## 2019-01-01 RX ADMIN — CARVEDILOL 6.25 MG: 6.25 TABLET, FILM COATED ORAL at 17:21

## 2019-01-01 RX ADMIN — DOCUSATE SODIUM 50 MG: 50 LIQUID ORAL at 08:47

## 2019-01-01 RX ADMIN — ALBUTEROL SULFATE 2.5 MG: 2.5 SOLUTION RESPIRATORY (INHALATION) at 15:19

## 2019-01-01 RX ADMIN — ALBUTEROL SULFATE 2.5 MG: 2.5 SOLUTION RESPIRATORY (INHALATION) at 03:02

## 2019-01-01 RX ADMIN — FUROSEMIDE 80 MG: 40 TABLET ORAL at 08:52

## 2019-01-01 RX ADMIN — BUDESONIDE 500 MCG: 0.5 INHALANT RESPIRATORY (INHALATION) at 20:03

## 2019-01-01 RX ADMIN — PANTOPRAZOLE SODIUM 40 MG: 40 INJECTION, POWDER, FOR SOLUTION INTRAVENOUS at 21:33

## 2019-01-01 RX ADMIN — PANTOPRAZOLE SODIUM 40 MG: 40 TABLET, DELAYED RELEASE ORAL at 06:11

## 2019-01-01 RX ADMIN — NYSTATIN: 100000 POWDER TOPICAL at 09:13

## 2019-01-01 RX ADMIN — ALBUTEROL SULFATE 2.5 MG: 2.5 SOLUTION RESPIRATORY (INHALATION) at 20:40

## 2019-01-01 RX ADMIN — CARVEDILOL 12.5 MG: 12.5 TABLET, FILM COATED ORAL at 18:19

## 2019-01-01 RX ADMIN — FUROSEMIDE 40 MG: 20 TABLET ORAL at 10:00

## 2019-01-01 RX ADMIN — Medication 10 ML: at 21:41

## 2019-01-01 RX ADMIN — TRAZODONE HYDROCHLORIDE 50 MG: 50 TABLET ORAL at 20:28

## 2019-01-01 RX ADMIN — Medication 5 ML: at 05:53

## 2019-01-01 RX ADMIN — SODIUM CHLORIDE 75 ML/HR: 900 INJECTION, SOLUTION INTRAVENOUS at 13:00

## 2019-01-01 RX ADMIN — NYSTATIN: 100000 POWDER TOPICAL at 10:52

## 2019-01-01 RX ADMIN — SERTRALINE HYDROCHLORIDE 50 MG: 50 TABLET ORAL at 08:10

## 2019-01-01 RX ADMIN — CARVEDILOL 6.25 MG: 6.25 TABLET, FILM COATED ORAL at 08:47

## 2019-01-01 RX ADMIN — ALBUTEROL SULFATE 2.5 MG: 2.5 SOLUTION RESPIRATORY (INHALATION) at 15:47

## 2019-01-01 RX ADMIN — WARFARIN SODIUM 5 MG: 5 TABLET ORAL at 17:38

## 2019-01-01 RX ADMIN — FERROUS GLUCONATE 1 TABLET: 324 TABLET ORAL at 17:29

## 2019-01-01 RX ADMIN — SACUBITRIL AND VALSARTAN 1 TABLET: 24; 26 TABLET, FILM COATED ORAL at 18:00

## 2019-01-01 RX ADMIN — BUDESONIDE 500 MCG: 0.5 INHALANT RESPIRATORY (INHALATION) at 08:12

## 2019-01-01 RX ADMIN — EPOETIN ALFA-EPBX 80000 UNITS: 40000 INJECTION, SOLUTION INTRAVENOUS; SUBCUTANEOUS at 12:45

## 2019-01-01 RX ADMIN — Medication 5 ML: at 18:21

## 2019-01-01 RX ADMIN — PERFLUTREN 1 ML: 6.52 INJECTION, SUSPENSION INTRAVENOUS at 14:00

## 2019-01-01 RX ADMIN — CARVEDILOL 12.5 MG: 12.5 TABLET, FILM COATED ORAL at 17:30

## 2019-01-01 RX ADMIN — PROPOFOL 15 MCG/KG/MIN: 10 INJECTION, EMULSION INTRAVENOUS at 02:30

## 2019-01-01 RX ADMIN — FERROUS GLUCONATE 1 TABLET: 324 TABLET ORAL at 17:21

## 2019-01-01 RX ADMIN — ALBUTEROL SULFATE 2.5 MG: 2.5 SOLUTION RESPIRATORY (INHALATION) at 07:38

## 2019-01-01 RX ADMIN — SERTRALINE HYDROCHLORIDE 50 MG: 50 TABLET ORAL at 08:53

## 2019-01-01 RX ADMIN — PANTOPRAZOLE SODIUM 40 MG: 40 TABLET, DELAYED RELEASE ORAL at 07:29

## 2019-01-01 RX ADMIN — PANTOPRAZOLE SODIUM 40 MG: 40 INJECTION, POWDER, FOR SOLUTION INTRAVENOUS at 21:52

## 2019-01-01 RX ADMIN — CARVEDILOL 12.5 MG: 12.5 TABLET, FILM COATED ORAL at 09:12

## 2019-01-01 RX ADMIN — EPOETIN ALFA-EPBX 80000 UNITS: 40000 INJECTION, SOLUTION INTRAVENOUS; SUBCUTANEOUS at 11:50

## 2019-01-01 RX ADMIN — ALBUTEROL SULFATE 2.5 MG: 2.5 SOLUTION RESPIRATORY (INHALATION) at 19:16

## 2019-01-01 RX ADMIN — TRAZODONE HYDROCHLORIDE 50 MG: 50 TABLET ORAL at 21:56

## 2019-01-01 RX ADMIN — DOCUSATE SODIUM 100 MG: 100 CAPSULE, LIQUID FILLED ORAL at 08:52

## 2019-01-01 RX ADMIN — BUDESONIDE 500 MCG: 0.5 INHALANT RESPIRATORY (INHALATION) at 07:11

## 2019-01-01 RX ADMIN — WARFARIN SODIUM 4 MG: 2 TABLET ORAL at 18:16

## 2019-01-01 RX ADMIN — Medication 10 ML: at 21:39

## 2019-01-01 RX ADMIN — DOCUSATE SODIUM 100 MG: 100 CAPSULE, LIQUID FILLED ORAL at 10:50

## 2019-01-01 RX ADMIN — ONDANSETRON 4 MG: 2 INJECTION INTRAMUSCULAR; INTRAVENOUS at 13:28

## 2019-01-01 RX ADMIN — CARVEDILOL 6.25 MG: 6.25 TABLET, FILM COATED ORAL at 18:04

## 2019-01-01 RX ADMIN — POTASSIUM CHLORIDE 40 MEQ: 20 TABLET, EXTENDED RELEASE ORAL at 13:25

## 2019-01-01 RX ADMIN — EPOETIN ALFA-EPBX 60000 UNITS: 40000 INJECTION, SOLUTION INTRAVENOUS; SUBCUTANEOUS at 12:41

## 2019-01-01 RX ADMIN — Medication 10 ML: at 05:05

## 2019-01-01 RX ADMIN — SODIUM CHLORIDE, SODIUM LACTATE, POTASSIUM CHLORIDE, AND CALCIUM CHLORIDE 1000 ML: 600; 310; 30; 20 INJECTION, SOLUTION INTRAVENOUS at 14:39

## 2019-01-01 RX ADMIN — BUDESONIDE 500 MCG: 0.5 INHALANT RESPIRATORY (INHALATION) at 07:31

## 2019-01-01 RX ADMIN — NYSTATIN: 100000 POWDER TOPICAL at 23:39

## 2019-01-01 RX ADMIN — SODIUM CHLORIDE 100 ML/HR: 450 INJECTION, SOLUTION INTRAVENOUS at 14:52

## 2019-01-01 RX ADMIN — Medication 10 ML: at 05:42

## 2019-01-01 RX ADMIN — ALBUTEROL SULFATE 2.5 MG: 2.5 SOLUTION RESPIRATORY (INHALATION) at 21:21

## 2019-01-01 RX ADMIN — ASPIRIN 81 MG: 81 TABLET ORAL at 09:31

## 2019-01-01 RX ADMIN — FERROUS GLUCONATE 1 TABLET: 324 TABLET ORAL at 17:38

## 2019-01-01 RX ADMIN — HEPARIN SODIUM 5 UNITS/KG/HR: 5000 INJECTION, SOLUTION INTRAVENOUS at 11:31

## 2019-01-01 RX ADMIN — SIMVASTATIN 20 MG: 20 TABLET, FILM COATED ORAL at 21:51

## 2019-01-01 RX ADMIN — HEPARIN SODIUM 3600 UNITS: 5000 INJECTION INTRAVENOUS; SUBCUTANEOUS at 12:58

## 2019-01-01 RX ADMIN — HEPARIN SODIUM 8 UNITS/KG/HR: 5000 INJECTION, SOLUTION INTRAVENOUS at 09:14

## 2019-01-01 RX ADMIN — CARVEDILOL 12.5 MG: 12.5 TABLET, FILM COATED ORAL at 08:28

## 2019-01-01 RX ADMIN — CARVEDILOL 6.25 MG: 6.25 TABLET, FILM COATED ORAL at 08:25

## 2019-01-01 RX ADMIN — FUROSEMIDE 40 MG: 10 INJECTION, SOLUTION INTRAMUSCULAR; INTRAVENOUS at 09:12

## 2019-01-01 RX ADMIN — HEPARIN SODIUM 10 UNITS/KG/HR: 5000 INJECTION, SOLUTION INTRAVENOUS at 18:07

## 2019-01-01 RX ADMIN — TRAMADOL HYDROCHLORIDE 50 MG: 50 TABLET, FILM COATED ORAL at 22:50

## 2019-01-01 RX ADMIN — CARVEDILOL 12.5 MG: 12.5 TABLET, FILM COATED ORAL at 17:15

## 2019-01-01 RX ADMIN — CARVEDILOL 12.5 MG: 12.5 TABLET, FILM COATED ORAL at 08:41

## 2019-01-01 RX ADMIN — ACETAMINOPHEN 650 MG: 325 TABLET, FILM COATED ORAL at 01:48

## 2019-01-01 RX ADMIN — FUROSEMIDE 80 MG: 40 TABLET ORAL at 10:50

## 2019-01-01 RX ADMIN — HEPARIN SODIUM 12 UNITS/KG/HR: 5000 INJECTION, SOLUTION INTRAVENOUS at 12:10

## 2019-01-01 RX ADMIN — ALBUTEROL SULFATE 2.5 MG: 2.5 SOLUTION RESPIRATORY (INHALATION) at 20:20

## 2019-01-01 RX ADMIN — CARVEDILOL 12.5 MG: 12.5 TABLET, FILM COATED ORAL at 17:35

## 2019-01-01 RX ADMIN — FERROUS GLUCONATE 1 TABLET: 324 TABLET ORAL at 10:11

## 2019-01-01 RX ADMIN — TRAZODONE HYDROCHLORIDE 50 MG: 50 TABLET ORAL at 00:33

## 2019-01-01 RX ADMIN — ALBUTEROL SULFATE 2.5 MG: 2.5 SOLUTION RESPIRATORY (INHALATION) at 22:30

## 2019-01-01 RX ADMIN — ALBUTEROL SULFATE 2.5 MG: 2.5 SOLUTION RESPIRATORY (INHALATION) at 14:16

## 2019-01-01 RX ADMIN — BUDESONIDE 500 MCG: 0.5 INHALANT RESPIRATORY (INHALATION) at 08:05

## 2019-01-01 RX ADMIN — Medication 10 ML: at 05:23

## 2019-01-01 RX ADMIN — DOCUSATE SODIUM 100 MG: 100 CAPSULE, LIQUID FILLED ORAL at 08:10

## 2019-01-01 RX ADMIN — SIMVASTATIN 20 MG: 20 TABLET, FILM COATED ORAL at 21:36

## 2019-01-01 RX ADMIN — DOCUSATE SODIUM 50 MG: 50 LIQUID ORAL at 08:26

## 2019-01-01 RX ADMIN — DOCUSATE SODIUM 100 MG: 100 CAPSULE, LIQUID FILLED ORAL at 10:53

## 2019-01-01 RX ADMIN — POLYETHYLENE GLYCOL 3350 17 G: 17 POWDER, FOR SOLUTION ORAL at 17:22

## 2019-01-01 RX ADMIN — WARFARIN SODIUM 1 MG: 1 TABLET ORAL at 10:32

## 2019-01-01 RX ADMIN — ALBUTEROL SULFATE 2.5 MG: 2.5 SOLUTION RESPIRATORY (INHALATION) at 20:07

## 2019-01-01 RX ADMIN — Medication 10 ML: at 13:45

## 2019-01-01 RX ADMIN — ALBUTEROL SULFATE 2.5 MG: 2.5 SOLUTION RESPIRATORY (INHALATION) at 07:12

## 2019-01-01 RX ADMIN — Medication 10 ML: at 22:52

## 2019-01-01 RX ADMIN — ALBUTEROL SULFATE 2.5 MG: 2.5 SOLUTION RESPIRATORY (INHALATION) at 07:49

## 2019-01-01 RX ADMIN — FUROSEMIDE 40 MG: 10 INJECTION, SOLUTION INTRAMUSCULAR; INTRAVENOUS at 17:26

## 2019-01-01 RX ADMIN — HEPARIN SODIUM 8 UNITS/KG/HR: 5000 INJECTION, SOLUTION INTRAVENOUS at 20:02

## 2019-01-01 RX ADMIN — ACETAMINOPHEN 650 MG: 325 TABLET, FILM COATED ORAL at 12:57

## 2019-01-01 RX ADMIN — PANTOPRAZOLE SODIUM 40 MG: 40 INJECTION, POWDER, FOR SOLUTION INTRAVENOUS at 21:56

## 2019-01-01 RX ADMIN — POLYETHYLENE GLYCOL 3350 17 G: 17 POWDER, FOR SOLUTION ORAL at 10:11

## 2019-01-01 RX ADMIN — BUDESONIDE 500 MCG: 0.5 INHALANT RESPIRATORY (INHALATION) at 08:33

## 2019-01-01 RX ADMIN — ASPIRIN 81 MG: 81 TABLET ORAL at 10:50

## 2019-01-01 RX ADMIN — ALBUTEROL SULFATE 2.5 MG: 2.5 SOLUTION RESPIRATORY (INHALATION) at 08:06

## 2019-01-01 RX ADMIN — ALBUTEROL SULFATE 2.5 MG: 2.5 SOLUTION RESPIRATORY (INHALATION) at 07:48

## 2019-01-01 RX ADMIN — FUROSEMIDE 80 MG: 40 TABLET ORAL at 09:29

## 2019-01-01 RX ADMIN — FUROSEMIDE 40 MG: 10 INJECTION, SOLUTION INTRAMUSCULAR; INTRAVENOUS at 10:32

## 2019-01-01 RX ADMIN — ASPIRIN 81 MG: 81 TABLET, COATED ORAL at 10:53

## 2019-01-01 RX ADMIN — ALBUTEROL SULFATE 2.5 MG: 2.5 SOLUTION RESPIRATORY (INHALATION) at 13:32

## 2019-01-01 RX ADMIN — ALBUTEROL SULFATE 2.5 MG: 2.5 SOLUTION RESPIRATORY (INHALATION) at 20:00

## 2019-01-01 RX ADMIN — MIDAZOLAM HYDROCHLORIDE 5 MG: 1 INJECTION, SOLUTION INTRAMUSCULAR; INTRAVENOUS at 09:57

## 2019-01-01 RX ADMIN — Medication 10 ML: at 13:41

## 2019-01-01 RX ADMIN — ALBUTEROL SULFATE 2.5 MG: 2.5 SOLUTION RESPIRATORY (INHALATION) at 04:56

## 2019-01-01 RX ADMIN — ALBUTEROL SULFATE 2.5 MG: 2.5 SOLUTION RESPIRATORY (INHALATION) at 13:53

## 2019-01-01 RX ADMIN — Medication 10 ML: at 06:20

## 2019-01-01 RX ADMIN — BUDESONIDE 500 MCG: 0.5 INHALANT RESPIRATORY (INHALATION) at 19:00

## 2019-01-01 RX ADMIN — NYSTATIN: 100000 POWDER TOPICAL at 17:45

## 2019-01-01 RX ADMIN — DOCUSATE SODIUM 100 MG: 100 CAPSULE, LIQUID FILLED ORAL at 09:18

## 2019-01-01 RX ADMIN — SIMVASTATIN 20 MG: 20 TABLET, FILM COATED ORAL at 21:08

## 2019-01-01 RX ADMIN — ALBUTEROL SULFATE 2.5 MG: 2.5 SOLUTION RESPIRATORY (INHALATION) at 19:34

## 2019-01-01 RX ADMIN — FERROUS GLUCONATE 1 TABLET: 324 TABLET ORAL at 16:48

## 2019-01-01 RX ADMIN — ALBUTEROL SULFATE 2.5 MG: 2.5 SOLUTION RESPIRATORY (INHALATION) at 01:31

## 2019-01-01 RX ADMIN — ALBUTEROL SULFATE 2.5 MG: 2.5 SOLUTION RESPIRATORY (INHALATION) at 03:33

## 2019-01-01 RX ADMIN — ASPIRIN 81 MG: 81 TABLET ORAL at 08:52

## 2019-01-01 RX ADMIN — TRAZODONE HYDROCHLORIDE 50 MG: 50 TABLET ORAL at 22:04

## 2019-01-01 RX ADMIN — FUROSEMIDE 40 MG: 10 INJECTION, SOLUTION INTRAMUSCULAR; INTRAVENOUS at 08:09

## 2019-01-01 RX ADMIN — GUAIFENESIN 1200 MG: 600 TABLET, EXTENDED RELEASE ORAL at 21:20

## 2019-01-01 RX ADMIN — PANTOPRAZOLE SODIUM 40 MG: 40 TABLET, DELAYED RELEASE ORAL at 10:50

## 2019-01-01 RX ADMIN — Medication 10 ML: at 05:43

## 2019-01-01 RX ADMIN — FUROSEMIDE 40 MG: 20 TABLET ORAL at 07:39

## 2019-01-01 RX ADMIN — MIDAZOLAM 6 MG/HR: 5 INJECTION INTRAMUSCULAR; INTRAVENOUS at 11:29

## 2019-01-01 RX ADMIN — HEPARIN SODIUM 12 UNITS/KG/HR: 5000 INJECTION, SOLUTION INTRAVENOUS at 15:29

## 2019-01-01 RX ADMIN — CARVEDILOL 6.25 MG: 6.25 TABLET, FILM COATED ORAL at 10:53

## 2019-01-01 RX ADMIN — WARFARIN SODIUM 3 MG: 2 TABLET ORAL at 17:15

## 2019-01-01 RX ADMIN — POLYETHYLENE GLYCOL 3350 17 G: 17 POWDER, FOR SOLUTION ORAL at 09:58

## 2019-01-01 RX ADMIN — DOCUSATE SODIUM 100 MG: 100 CAPSULE, LIQUID FILLED ORAL at 09:13

## 2019-01-01 RX ADMIN — POLYETHYLENE GLYCOL 3350 17 G: 17 POWDER, FOR SOLUTION ORAL at 07:38

## 2019-01-01 RX ADMIN — Medication 10 ML: at 06:27

## 2019-01-01 RX ADMIN — PANTOPRAZOLE SODIUM 40 MG: 40 INJECTION, POWDER, FOR SOLUTION INTRAVENOUS at 21:08

## 2019-01-01 RX ADMIN — WARFARIN SODIUM 4 MG: 2 TABLET ORAL at 17:16

## 2019-01-01 RX ADMIN — FERROUS GLUCONATE 1 TABLET: 324 TABLET ORAL at 09:01

## 2019-01-01 RX ADMIN — Medication 10 ML: at 05:32

## 2019-01-01 RX ADMIN — FUROSEMIDE 40 MG: 20 TABLET ORAL at 10:11

## 2019-01-01 RX ADMIN — ALBUTEROL SULFATE 2.5 MG: 2.5 SOLUTION RESPIRATORY (INHALATION) at 07:45

## 2019-01-01 RX ADMIN — FUROSEMIDE 40 MG: 10 INJECTION, SOLUTION INTRAMUSCULAR; INTRAVENOUS at 09:59

## 2019-01-01 RX ADMIN — TUBERCULIN PURIFIED PROTEIN DERIVATIVE 5 UNITS: 5 INJECTION, SOLUTION INTRADERMAL at 17:06

## 2019-01-01 RX ADMIN — ALBUTEROL SULFATE 2.5 MG: 2.5 SOLUTION RESPIRATORY (INHALATION) at 08:33

## 2019-01-01 RX ADMIN — GUAIFENESIN 1200 MG: 600 TABLET, EXTENDED RELEASE ORAL at 22:15

## 2019-01-01 RX ADMIN — ALBUTEROL SULFATE 2.5 MG: 2.5 SOLUTION RESPIRATORY (INHALATION) at 21:11

## 2019-01-01 RX ADMIN — HEPARIN SODIUM 7 UNITS/KG/HR: 5000 INJECTION, SOLUTION INTRAVENOUS at 04:16

## 2019-01-01 RX ADMIN — Medication 5 ML: at 15:20

## 2019-01-01 RX ADMIN — Medication 10 ML: at 21:51

## 2019-01-01 RX ADMIN — DOCUSATE SODIUM 100 MG: 100 CAPSULE, LIQUID FILLED ORAL at 11:03

## 2019-01-01 RX ADMIN — BISACODYL 10 MG: 5 TABLET, COATED ORAL at 18:32

## 2019-01-01 RX ADMIN — ASPIRIN 81 MG: 81 TABLET ORAL at 08:36

## 2019-01-01 RX ADMIN — ALBUTEROL SULFATE 2.5 MG: 2.5 SOLUTION RESPIRATORY (INHALATION) at 07:14

## 2019-01-01 RX ADMIN — Medication 10 ML: at 17:37

## 2019-01-01 RX ADMIN — FERROUS GLUCONATE 1 TABLET: 324 TABLET ORAL at 08:47

## 2019-01-01 RX ADMIN — EPOETIN ALFA-EPBX 60000 UNITS: 40000 INJECTION, SOLUTION INTRAVENOUS; SUBCUTANEOUS at 17:07

## 2019-01-01 RX ADMIN — DOCUSATE SODIUM 100 MG: 100 CAPSULE, LIQUID FILLED ORAL at 00:32

## 2019-01-01 RX ADMIN — FERROUS GLUCONATE 1 TABLET: 324 TABLET ORAL at 10:54

## 2019-01-01 RX ADMIN — GUAIFENESIN 1200 MG: 600 TABLET, EXTENDED RELEASE ORAL at 09:29

## 2019-01-01 RX ADMIN — CARVEDILOL 12.5 MG: 12.5 TABLET, FILM COATED ORAL at 08:38

## 2019-01-01 RX ADMIN — PANTOPRAZOLE SODIUM 40 MG: 40 INJECTION, POWDER, FOR SOLUTION INTRAVENOUS at 08:17

## 2019-01-01 RX ADMIN — ALBUTEROL SULFATE 2.5 MG: 2.5 SOLUTION RESPIRATORY (INHALATION) at 20:10

## 2019-01-01 RX ADMIN — WARFARIN SODIUM 5 MG: 5 TABLET ORAL at 17:21

## 2019-01-01 RX ADMIN — PATIROMER 8.4 G: 8.4 POWDER, FOR SUSPENSION ORAL at 09:19

## 2019-01-01 RX ADMIN — FUROSEMIDE 40 MG: 10 INJECTION, SOLUTION INTRAMUSCULAR; INTRAVENOUS at 17:16

## 2019-01-01 RX ADMIN — FUROSEMIDE 40 MG: 10 INJECTION, SOLUTION INTRAMUSCULAR; INTRAVENOUS at 08:25

## 2019-01-01 RX ADMIN — ALBUTEROL SULFATE 2.5 MG: 2.5 SOLUTION RESPIRATORY (INHALATION) at 08:05

## 2019-01-01 RX ADMIN — FERROUS GLUCONATE 1 TABLET: 324 TABLET ORAL at 09:11

## 2019-01-01 RX ADMIN — HEPARIN SODIUM 12 UNITS/KG/HR: 5000 INJECTION, SOLUTION INTRAVENOUS at 15:11

## 2019-01-01 RX ADMIN — POLYETHYLENE GLYCOL 3350 17 G: 17 POWDER, FOR SOLUTION ORAL at 18:32

## 2019-01-01 RX ADMIN — PANTOPRAZOLE SODIUM 40 MG: 40 TABLET, DELAYED RELEASE ORAL at 08:21

## 2019-01-01 RX ADMIN — WARFARIN SODIUM 1 MG: 1 TABLET ORAL at 18:00

## 2019-01-01 RX ADMIN — EPOETIN ALFA-EPBX 80000 UNITS: 40000 INJECTION, SOLUTION INTRAVENOUS; SUBCUTANEOUS at 12:00

## 2019-01-01 RX ADMIN — Medication 10 ML: at 21:13

## 2019-01-01 RX ADMIN — ALBUTEROL SULFATE 2.5 MG: 2.5 SOLUTION RESPIRATORY (INHALATION) at 19:20

## 2019-01-01 RX ADMIN — FUROSEMIDE 40 MG: 10 INJECTION, SOLUTION INTRAMUSCULAR; INTRAVENOUS at 09:18

## 2019-01-01 RX ADMIN — FUROSEMIDE 40 MG: 10 INJECTION, SOLUTION INTRAMUSCULAR; INTRAVENOUS at 09:57

## 2019-01-01 RX ADMIN — ALBUTEROL SULFATE 2.5 MG: 2.5 SOLUTION RESPIRATORY (INHALATION) at 08:12

## 2019-01-01 RX ADMIN — FUROSEMIDE 80 MG: 40 TABLET ORAL at 08:20

## 2019-01-01 RX ADMIN — SERTRALINE HYDROCHLORIDE 50 MG: 50 TABLET ORAL at 08:25

## 2019-01-01 RX ADMIN — TRAMADOL HYDROCHLORIDE 50 MG: 50 TABLET, FILM COATED ORAL at 21:20

## 2019-01-01 RX ADMIN — Medication 10 ML: at 05:01

## 2019-01-01 RX ADMIN — BUDESONIDE 500 MCG: 0.5 INHALANT RESPIRATORY (INHALATION) at 07:28

## 2019-01-01 RX ADMIN — ALBUTEROL SULFATE 2.5 MG: 2.5 SOLUTION RESPIRATORY (INHALATION) at 15:48

## 2019-01-01 RX ADMIN — ALBUTEROL SULFATE 2.5 MG: 2.5 SOLUTION RESPIRATORY (INHALATION) at 00:05

## 2019-01-01 RX ADMIN — FUROSEMIDE 40 MG: 10 INJECTION, SOLUTION INTRAMUSCULAR; INTRAVENOUS at 18:04

## 2019-01-01 RX ADMIN — PANTOPRAZOLE SODIUM 40 MG: 40 TABLET, DELAYED RELEASE ORAL at 08:25

## 2019-01-01 RX ADMIN — BUDESONIDE 500 MCG: 0.5 INHALANT RESPIRATORY (INHALATION) at 20:00

## 2019-01-01 RX ADMIN — SODIUM CHLORIDE 500 ML: 900 INJECTION, SOLUTION INTRAVENOUS at 17:20

## 2019-01-01 RX ADMIN — TRAMADOL HYDROCHLORIDE 50 MG: 50 TABLET, FILM COATED ORAL at 09:52

## 2019-01-01 RX ADMIN — WARFARIN SODIUM 5 MG: 5 TABLET ORAL at 17:28

## 2019-01-01 RX ADMIN — PANTOPRAZOLE SODIUM 40 MG: 40 TABLET, DELAYED RELEASE ORAL at 08:40

## 2019-01-01 RX ADMIN — FERROUS GLUCONATE 1 TABLET: 324 TABLET ORAL at 17:16

## 2019-01-01 RX ADMIN — ALBUTEROL SULFATE 2.5 MG: 2.5 SOLUTION RESPIRATORY (INHALATION) at 16:54

## 2019-01-01 RX ADMIN — CARVEDILOL 6.25 MG: 6.25 TABLET, FILM COATED ORAL at 08:29

## 2019-01-01 RX ADMIN — BUDESONIDE 500 MCG: 0.5 INHALANT RESPIRATORY (INHALATION) at 07:33

## 2019-01-01 RX ADMIN — FERROUS GLUCONATE 1 TABLET: 324 TABLET ORAL at 17:25

## 2019-01-01 RX ADMIN — BUDESONIDE 500 MCG: 0.5 INHALANT RESPIRATORY (INHALATION) at 20:46

## 2019-01-01 RX ADMIN — HEPARIN SODIUM 4000 UNITS: 5000 INJECTION, SOLUTION INTRAVENOUS; SUBCUTANEOUS at 01:21

## 2019-01-01 RX ADMIN — INSULIN HUMAN 10 UNITS: 100 INJECTION, SOLUTION PARENTERAL at 16:58

## 2019-01-01 RX ADMIN — ALBUTEROL SULFATE 2.5 MG: 2.5 SOLUTION RESPIRATORY (INHALATION) at 14:26

## 2019-01-01 RX ADMIN — ALBUTEROL SULFATE 2.5 MG: 2.5 SOLUTION RESPIRATORY (INHALATION) at 14:25

## 2019-01-01 RX ADMIN — Medication 10 ML: at 21:37

## 2019-01-01 RX ADMIN — CARVEDILOL 6.25 MG: 6.25 TABLET, FILM COATED ORAL at 09:26

## 2019-01-01 RX ADMIN — BUDESONIDE 500 MCG: 0.5 INHALANT RESPIRATORY (INHALATION) at 08:18

## 2019-01-01 RX ADMIN — TRAMADOL HYDROCHLORIDE 50 MG: 50 TABLET, FILM COATED ORAL at 12:03

## 2019-01-01 RX ADMIN — TRAMADOL HYDROCHLORIDE 50 MG: 50 TABLET, FILM COATED ORAL at 21:28

## 2019-01-01 RX ADMIN — FERROUS GLUCONATE 1 TABLET: 324 TABLET ORAL at 08:15

## 2019-01-01 RX ADMIN — ALBUTEROL SULFATE 2.5 MG: 2.5 SOLUTION RESPIRATORY (INHALATION) at 20:47

## 2019-01-01 RX ADMIN — BUDESONIDE 500 MCG: 0.5 INHALANT RESPIRATORY (INHALATION) at 19:54

## 2019-01-01 RX ADMIN — Medication 10 ML: at 13:27

## 2019-01-01 RX ADMIN — TRAZODONE HYDROCHLORIDE 50 MG: 50 TABLET ORAL at 21:36

## 2019-01-01 RX ADMIN — NYSTATIN: 100000 POWDER TOPICAL at 17:16

## 2019-01-01 RX ADMIN — SERTRALINE HYDROCHLORIDE 50 MG: 50 TABLET ORAL at 09:12

## 2019-01-01 RX ADMIN — TRAMADOL HYDROCHLORIDE 50 MG: 50 TABLET, FILM COATED ORAL at 18:01

## 2019-01-01 RX ADMIN — ALBUTEROL SULFATE 2.5 MG: 2.5 SOLUTION RESPIRATORY (INHALATION) at 20:02

## 2019-01-01 RX ADMIN — Medication 10 ML: at 21:26

## 2019-01-01 RX ADMIN — Medication 10 ML: at 21:58

## 2019-01-01 RX ADMIN — PANTOPRAZOLE SODIUM 40 MG: 40 TABLET, DELAYED RELEASE ORAL at 08:38

## 2019-01-01 RX ADMIN — SERTRALINE HYDROCHLORIDE 50 MG: 50 TABLET ORAL at 09:26

## 2019-01-01 RX ADMIN — ALBUTEROL SULFATE 2.5 MG: 2.5 SOLUTION RESPIRATORY (INHALATION) at 11:30

## 2019-01-01 RX ADMIN — SERTRALINE HYDROCHLORIDE 50 MG: 50 TABLET ORAL at 08:41

## 2019-01-01 RX ADMIN — TRAMADOL HYDROCHLORIDE 50 MG: 50 TABLET, FILM COATED ORAL at 09:16

## 2019-01-01 RX ADMIN — ASPIRIN 81 MG: 81 TABLET ORAL at 09:12

## 2019-01-01 RX ADMIN — ALBUTEROL SULFATE 2.5 MG: 2.5 SOLUTION RESPIRATORY (INHALATION) at 04:03

## 2019-01-01 RX ADMIN — ALBUTEROL SULFATE 2.5 MG: 2.5 SOLUTION RESPIRATORY (INHALATION) at 08:18

## 2019-01-01 RX ADMIN — BUDESONIDE 500 MCG: 0.5 INHALANT RESPIRATORY (INHALATION) at 08:37

## 2019-01-01 RX ADMIN — ALBUTEROL SULFATE 2.5 MG: 2.5 SOLUTION RESPIRATORY (INHALATION) at 03:15

## 2019-01-01 RX ADMIN — HEPARIN SODIUM 3350 UNITS: 5000 INJECTION, SOLUTION INTRAVENOUS; SUBCUTANEOUS at 04:57

## 2019-01-01 RX ADMIN — ASPIRIN 81 MG: 81 TABLET ORAL at 09:27

## 2019-01-01 RX ADMIN — HEPARIN SODIUM 10 UNITS/KG/HR: 5000 INJECTION, SOLUTION INTRAVENOUS at 15:14

## 2019-01-01 RX ADMIN — GUAIFENESIN 1200 MG: 600 TABLET, EXTENDED RELEASE ORAL at 08:41

## 2019-01-01 RX ADMIN — ISOSORBIDE MONONITRATE 30 MG: 30 TABLET, EXTENDED RELEASE ORAL at 08:52

## 2019-01-01 RX ADMIN — ALBUTEROL SULFATE 2.5 MG: 2.5 SOLUTION RESPIRATORY (INHALATION) at 08:38

## 2019-01-01 RX ADMIN — HEPARIN SODIUM 3450 UNITS: 5000 INJECTION, SOLUTION INTRAVENOUS; SUBCUTANEOUS at 03:01

## 2019-01-01 RX ADMIN — EPOETIN ALFA-EPBX 60000 UNITS: 40000 INJECTION, SOLUTION INTRAVENOUS; SUBCUTANEOUS at 12:12

## 2019-01-01 RX ADMIN — ALBUTEROL SULFATE 2.5 MG: 2.5 SOLUTION RESPIRATORY (INHALATION) at 15:10

## 2019-01-01 RX ADMIN — ISOSORBIDE MONONITRATE 30 MG: 30 TABLET, EXTENDED RELEASE ORAL at 09:25

## 2019-01-01 RX ADMIN — FUROSEMIDE 40 MG: 10 INJECTION, SOLUTION INTRAMUSCULAR; INTRAVENOUS at 09:25

## 2019-01-01 RX ADMIN — ALBUTEROL SULFATE 2.5 MG: 2.5 SOLUTION RESPIRATORY (INHALATION) at 21:01

## 2019-01-01 RX ADMIN — SERTRALINE HYDROCHLORIDE 50 MG: 50 TABLET ORAL at 08:36

## 2019-01-01 RX ADMIN — CARVEDILOL 6.25 MG: 6.25 TABLET, FILM COATED ORAL at 08:40

## 2019-01-01 RX ADMIN — GUAIFENESIN 1200 MG: 600 TABLET, EXTENDED RELEASE ORAL at 08:28

## 2019-01-01 RX ADMIN — ETOMIDATE 2 MG: 2 INJECTION INTRAVENOUS at 14:13

## 2019-01-01 RX ADMIN — Medication 10 ML: at 15:31

## 2019-01-01 RX ADMIN — WARFARIN SODIUM 4 MG: 2 TABLET ORAL at 17:51

## 2019-01-01 RX ADMIN — DOCUSATE SODIUM 50 MG: 50 LIQUID ORAL at 09:25

## 2019-01-01 RX ADMIN — SIMVASTATIN 20 MG: 20 TABLET, FILM COATED ORAL at 22:50

## 2019-01-01 RX ADMIN — PANTOPRAZOLE SODIUM 40 MG: 40 TABLET, DELAYED RELEASE ORAL at 06:01

## 2019-01-01 RX ADMIN — GUAIFENESIN 1200 MG: 600 TABLET, EXTENDED RELEASE ORAL at 10:49

## 2019-01-01 RX ADMIN — ASPIRIN 81 MG: 81 TABLET, COATED ORAL at 08:25

## 2019-01-01 RX ADMIN — DOCUSATE SODIUM 100 MG: 100 CAPSULE, LIQUID FILLED ORAL at 09:31

## 2019-01-01 RX ADMIN — NYSTATIN: 100000 POWDER TOPICAL at 08:36

## 2019-01-01 RX ADMIN — NYSTATIN: 100000 POWDER TOPICAL at 17:09

## 2019-01-01 RX ADMIN — SERTRALINE HYDROCHLORIDE 50 MG: 50 TABLET ORAL at 08:40

## 2019-01-01 RX ADMIN — CARVEDILOL 12.5 MG: 12.5 TABLET, FILM COATED ORAL at 10:50

## 2019-01-01 RX ADMIN — CARVEDILOL 12.5 MG: 12.5 TABLET, FILM COATED ORAL at 09:20

## 2019-01-01 RX ADMIN — CARVEDILOL 6.25 MG: 6.25 TABLET, FILM COATED ORAL at 10:11

## 2019-01-01 RX ADMIN — PROPOFOL 20 MCG/KG/MIN: 10 INJECTION, EMULSION INTRAVENOUS at 18:23

## 2019-01-01 RX ADMIN — ACETAMINOPHEN 650 MG: 325 TABLET, FILM COATED ORAL at 18:35

## 2019-01-01 RX ADMIN — ALBUTEROL SULFATE 2.5 MG: 2.5 SOLUTION RESPIRATORY (INHALATION) at 13:49

## 2019-01-01 RX ADMIN — HEPARIN SODIUM 8 UNITS/KG/HR: 5000 INJECTION, SOLUTION INTRAVENOUS at 17:24

## 2019-01-01 RX ADMIN — POLYETHYLENE GLYCOL 3350 17 G: 17 POWDER, FOR SOLUTION ORAL at 08:30

## 2019-01-01 RX ADMIN — BUDESONIDE 500 MCG: 0.5 INHALANT RESPIRATORY (INHALATION) at 07:27

## 2019-01-01 RX ADMIN — BUDESONIDE 500 MCG: 0.5 INHALANT RESPIRATORY (INHALATION) at 21:21

## 2019-01-01 RX ADMIN — ALBUTEROL SULFATE 2.5 MG: 2.5 SOLUTION RESPIRATORY (INHALATION) at 20:30

## 2019-01-01 RX ADMIN — Medication 10 ML: at 06:10

## 2019-01-01 RX ADMIN — FUROSEMIDE 80 MG: 40 TABLET ORAL at 09:28

## 2019-01-01 RX ADMIN — FERROUS GLUCONATE 1 TABLET: 324 TABLET ORAL at 18:09

## 2019-01-01 RX ADMIN — ACETAMINOPHEN 650 MG: 325 TABLET, FILM COATED ORAL at 12:58

## 2019-01-01 RX ADMIN — TRAMADOL HYDROCHLORIDE 50 MG: 50 TABLET, FILM COATED ORAL at 20:01

## 2019-01-01 RX ADMIN — WARFARIN SODIUM 4 MG: 2 TABLET ORAL at 18:19

## 2019-01-01 RX ADMIN — TRAZODONE HYDROCHLORIDE 50 MG: 50 TABLET ORAL at 22:46

## 2019-01-01 RX ADMIN — GUAIFENESIN 1200 MG: 600 TABLET, EXTENDED RELEASE ORAL at 09:18

## 2019-01-01 RX ADMIN — SIMVASTATIN 20 MG: 20 TABLET, FILM COATED ORAL at 21:52

## 2019-01-01 RX ADMIN — FUROSEMIDE 80 MG: 40 TABLET ORAL at 09:22

## 2019-01-01 RX ADMIN — CARVEDILOL 6.25 MG: 6.25 TABLET, FILM COATED ORAL at 09:18

## 2019-01-01 RX ADMIN — TRAZODONE HYDROCHLORIDE 50 MG: 50 TABLET ORAL at 21:08

## 2019-01-01 RX ADMIN — GUAIFENESIN 1200 MG: 600 TABLET, EXTENDED RELEASE ORAL at 20:28

## 2019-01-01 RX ADMIN — ALBUTEROL SULFATE 2.5 MG: 2.5 SOLUTION RESPIRATORY (INHALATION) at 20:09

## 2019-01-01 RX ADMIN — SIMVASTATIN 20 MG: 20 TABLET, FILM COATED ORAL at 22:05

## 2019-01-01 RX ADMIN — HEPARIN SODIUM 11 UNITS/KG/HR: 5000 INJECTION, SOLUTION INTRAVENOUS at 13:52

## 2019-01-01 RX ADMIN — PANTOPRAZOLE SODIUM 40 MG: 40 INJECTION, POWDER, FOR SOLUTION INTRAVENOUS at 09:59

## 2019-01-01 RX ADMIN — SERTRALINE HYDROCHLORIDE 50 MG: 50 TABLET ORAL at 08:21

## 2019-01-01 RX ADMIN — CARVEDILOL 6.25 MG: 6.25 TABLET, FILM COATED ORAL at 10:00

## 2019-01-01 RX ADMIN — FUROSEMIDE 80 MG: 40 TABLET ORAL at 08:36

## 2019-01-01 RX ADMIN — FERROUS GLUCONATE 1 TABLET: 324 TABLET ORAL at 17:28

## 2019-01-01 RX ADMIN — TRAMADOL HYDROCHLORIDE 50 MG: 50 TABLET, FILM COATED ORAL at 18:03

## 2019-01-01 RX ADMIN — Medication 10 ML: at 21:03

## 2019-01-01 RX ADMIN — CARVEDILOL 12.5 MG: 12.5 TABLET, FILM COATED ORAL at 09:29

## 2019-01-01 RX ADMIN — FUROSEMIDE 40 MG: 10 INJECTION, SOLUTION INTRAMUSCULAR; INTRAVENOUS at 13:22

## 2019-01-01 RX ADMIN — GUAIFENESIN 1200 MG: 600 TABLET, EXTENDED RELEASE ORAL at 22:46

## 2019-01-01 RX ADMIN — NYSTATIN: 100000 POWDER TOPICAL at 08:53

## 2019-01-01 RX ADMIN — TRAZODONE HYDROCHLORIDE 50 MG: 50 TABLET ORAL at 23:45

## 2019-01-01 RX ADMIN — CARVEDILOL 6.25 MG: 6.25 TABLET, FILM COATED ORAL at 08:53

## 2019-01-01 RX ADMIN — SERTRALINE HYDROCHLORIDE 50 MG: 50 TABLET ORAL at 08:29

## 2019-01-01 RX ADMIN — SODIUM CHLORIDE 500 ML: 900 INJECTION, SOLUTION INTRAVENOUS at 16:58

## 2019-01-01 RX ADMIN — CARVEDILOL 6.25 MG: 6.25 TABLET, FILM COATED ORAL at 09:08

## 2019-01-01 RX ADMIN — CARVEDILOL 6.25 MG: 6.25 TABLET, FILM COATED ORAL at 09:59

## 2019-01-01 RX ADMIN — Medication 10 ML: at 06:00

## 2019-01-01 RX ADMIN — Medication 800 MG: at 10:31

## 2019-01-01 RX ADMIN — Medication 10 ML: at 05:25

## 2019-01-01 RX ADMIN — FUROSEMIDE 40 MG: 10 INJECTION, SOLUTION INTRAMUSCULAR; INTRAVENOUS at 17:36

## 2019-01-01 RX ADMIN — PANTOPRAZOLE SODIUM 40 MG: 40 INJECTION, POWDER, FOR SOLUTION INTRAVENOUS at 09:01

## 2019-01-01 RX ADMIN — PANTOPRAZOLE SODIUM 40 MG: 40 INJECTION, POWDER, FOR SOLUTION INTRAVENOUS at 21:36

## 2019-01-01 RX ADMIN — Medication 10 ML: at 22:12

## 2019-01-01 RX ADMIN — GUAIFENESIN 1200 MG: 600 TABLET, EXTENDED RELEASE ORAL at 09:20

## 2019-01-01 RX ADMIN — ALBUTEROL SULFATE 2.5 MG: 2.5 SOLUTION RESPIRATORY (INHALATION) at 19:12

## 2019-01-01 RX ADMIN — FUROSEMIDE 40 MG: 20 TABLET ORAL at 08:15

## 2019-01-01 RX ADMIN — CARVEDILOL 12.5 MG: 12.5 TABLET, FILM COATED ORAL at 17:45

## 2019-01-01 RX ADMIN — PANTOPRAZOLE SODIUM 40 MG: 40 TABLET, DELAYED RELEASE ORAL at 09:29

## 2019-01-01 RX ADMIN — SERTRALINE HYDROCHLORIDE 50 MG: 50 TABLET ORAL at 08:38

## 2019-01-01 RX ADMIN — CARVEDILOL 6.25 MG: 6.25 TABLET, FILM COATED ORAL at 17:16

## 2019-01-01 RX ADMIN — NYSTATIN: 100000 POWDER TOPICAL at 09:28

## 2019-01-01 RX ADMIN — Medication 10 ML: at 05:51

## 2019-01-01 RX ADMIN — ALBUTEROL SULFATE 2.5 MG: 2.5 SOLUTION RESPIRATORY (INHALATION) at 14:39

## 2019-01-01 RX ADMIN — Medication 10 ML: at 22:06

## 2019-01-01 RX ADMIN — TRAMADOL HYDROCHLORIDE 50 MG: 50 TABLET, FILM COATED ORAL at 22:15

## 2019-01-01 RX ADMIN — ALBUTEROL SULFATE 2.5 MG: 2.5 SOLUTION RESPIRATORY (INHALATION) at 20:54

## 2019-01-01 RX ADMIN — Medication 5 ML: at 14:25

## 2019-01-01 RX ADMIN — HEPARIN SODIUM 12 UNITS/KG/HR: 5000 INJECTION, SOLUTION INTRAVENOUS at 11:41

## 2019-01-01 RX ADMIN — ALBUTEROL SULFATE 2.5 MG: 2.5 SOLUTION RESPIRATORY (INHALATION) at 04:14

## 2019-01-01 RX ADMIN — ALBUTEROL SULFATE 2.5 MG: 2.5 SOLUTION RESPIRATORY (INHALATION) at 11:49

## 2019-01-01 RX ADMIN — ETOMIDATE 2 MG: 2 INJECTION INTRAVENOUS at 14:26

## 2019-01-01 RX ADMIN — CARVEDILOL 6.25 MG: 6.25 TABLET, FILM COATED ORAL at 08:15

## 2019-01-01 RX ADMIN — Medication 5 ML: at 05:23

## 2019-01-01 RX ADMIN — PATIROMER 8.4 G: 8.4 POWDER, FOR SUSPENSION ORAL at 08:30

## 2019-01-01 RX ADMIN — CARVEDILOL 12.5 MG: 12.5 TABLET, FILM COATED ORAL at 16:49

## 2019-01-01 RX ADMIN — TRAMADOL HYDROCHLORIDE 50 MG: 50 TABLET, FILM COATED ORAL at 08:20

## 2019-01-01 RX ADMIN — TRAMADOL HYDROCHLORIDE 50 MG: 50 TABLET, FILM COATED ORAL at 17:49

## 2019-01-01 RX ADMIN — TRAZODONE HYDROCHLORIDE 50 MG: 50 TABLET ORAL at 22:36

## 2019-01-01 RX ADMIN — DOCUSATE SODIUM 100 MG: 100 CAPSULE, LIQUID FILLED ORAL at 08:20

## 2019-01-01 RX ADMIN — ALBUTEROL SULFATE 2.5 MG: 2.5 SOLUTION RESPIRATORY (INHALATION) at 08:27

## 2019-01-01 RX ADMIN — ALBUTEROL SULFATE 2.5 MG: 2.5 SOLUTION RESPIRATORY (INHALATION) at 07:52

## 2019-01-01 RX ADMIN — WARFARIN SODIUM 5 MG: 5 TABLET ORAL at 17:30

## 2019-01-01 RX ADMIN — WARFARIN SODIUM 1 MG: 1 TABLET ORAL at 17:21

## 2019-01-01 RX ADMIN — PANTOPRAZOLE SODIUM 40 MG: 40 INJECTION, POWDER, FOR SOLUTION INTRAVENOUS at 10:10

## 2019-01-01 RX ADMIN — FERROUS GLUCONATE 1 TABLET: 324 TABLET ORAL at 09:26

## 2019-01-01 RX ADMIN — Medication 10 ML: at 12:32

## 2019-01-01 RX ADMIN — Medication 5 ML: at 17:31

## 2019-01-01 RX ADMIN — SACUBITRIL AND VALSARTAN 1 TABLET: 24; 26 TABLET, FILM COATED ORAL at 09:25

## 2019-01-01 RX ADMIN — ASPIRIN 81 MG: 81 TABLET ORAL at 09:22

## 2019-01-01 RX ADMIN — FUROSEMIDE 40 MG: 10 INJECTION, SOLUTION INTRAMUSCULAR; INTRAVENOUS at 22:53

## 2019-01-01 RX ADMIN — ALBUTEROL SULFATE 2.5 MG: 2.5 SOLUTION RESPIRATORY (INHALATION) at 20:46

## 2019-01-01 RX ADMIN — BUDESONIDE 500 MCG: 0.5 INHALANT RESPIRATORY (INHALATION) at 19:16

## 2019-01-01 RX ADMIN — SIMVASTATIN 20 MG: 20 TABLET, FILM COATED ORAL at 21:41

## 2019-01-01 RX ADMIN — Medication 10 ML: at 22:01

## 2019-01-01 RX ADMIN — POLYETHYLENE GLYCOL 3350 17 G: 17 POWDER, FOR SOLUTION ORAL at 12:44

## 2019-01-01 RX ADMIN — SERTRALINE HYDROCHLORIDE 50 MG: 50 TABLET ORAL at 08:52

## 2019-01-01 RX ADMIN — HEPARIN SODIUM 4000 UNITS: 5000 INJECTION, SOLUTION INTRAVENOUS; SUBCUTANEOUS at 05:01

## 2019-01-01 RX ADMIN — PANTOPRAZOLE SODIUM 40 MG: 40 INJECTION, POWDER, FOR SOLUTION INTRAVENOUS at 08:30

## 2019-01-01 RX ADMIN — CARVEDILOL 6.25 MG: 6.25 TABLET, FILM COATED ORAL at 17:38

## 2019-01-01 RX ADMIN — ALBUTEROL SULFATE 2.5 MG: 2.5 SOLUTION RESPIRATORY (INHALATION) at 07:31

## 2019-01-01 RX ADMIN — ALBUTEROL SULFATE 2.5 MG: 2.5 SOLUTION RESPIRATORY (INHALATION) at 20:16

## 2019-01-01 RX ADMIN — NYSTATIN: 100000 POWDER TOPICAL at 20:03

## 2019-01-01 RX ADMIN — GUAIFENESIN 1200 MG: 600 TABLET, EXTENDED RELEASE ORAL at 23:39

## 2019-01-01 RX ADMIN — SILVER NITRATE APPLICATORS 1 APPLICATOR: 25; 75 STICK TOPICAL at 13:38

## 2019-01-01 RX ADMIN — ALBUTEROL SULFATE 2.5 MG: 2.5 SOLUTION RESPIRATORY (INHALATION) at 03:20

## 2019-01-01 RX ADMIN — HEPARIN SODIUM 8 UNITS/KG/HR: 5000 INJECTION, SOLUTION INTRAVENOUS at 05:39

## 2019-01-01 RX ADMIN — FUROSEMIDE 40 MG: 10 INJECTION, SOLUTION INTRAMUSCULAR; INTRAVENOUS at 11:07

## 2019-01-01 RX ADMIN — Medication 10 ML: at 05:59

## 2019-01-01 RX ADMIN — TRAZODONE HYDROCHLORIDE 50 MG: 50 TABLET ORAL at 21:41

## 2019-01-01 RX ADMIN — ALBUTEROL SULFATE 2.5 MG: 2.5 SOLUTION RESPIRATORY (INHALATION) at 08:24

## 2019-01-01 RX ADMIN — ALBUTEROL SULFATE 2.5 MG: 2.5 SOLUTION RESPIRATORY (INHALATION) at 15:46

## 2019-01-01 RX ADMIN — NYSTATIN: 100000 POWDER TOPICAL at 08:52

## 2019-01-01 RX ADMIN — CARVEDILOL 12.5 MG: 12.5 TABLET, FILM COATED ORAL at 08:52

## 2019-01-01 RX ADMIN — NYSTATIN: 100000 POWDER TOPICAL at 18:15

## 2019-01-01 RX ADMIN — ALBUTEROL SULFATE 2.5 MG: 2.5 SOLUTION RESPIRATORY (INHALATION) at 03:01

## 2019-01-01 RX ADMIN — HEPARIN SODIUM 3300 UNITS: 5000 INJECTION INTRAVENOUS; SUBCUTANEOUS at 03:01

## 2019-01-01 RX ADMIN — GUAIFENESIN 1200 MG: 600 TABLET, EXTENDED RELEASE ORAL at 08:38

## 2019-01-01 RX ADMIN — BUDESONIDE 500 MCG: 0.5 INHALANT RESPIRATORY (INHALATION) at 23:02

## 2019-01-01 RX ADMIN — PANTOPRAZOLE SODIUM 40 MG: 40 TABLET, DELAYED RELEASE ORAL at 09:20

## 2019-01-01 RX ADMIN — Medication 5 ML: at 05:41

## 2019-01-01 RX ADMIN — ALBUTEROL SULFATE 2.5 MG: 2.5 SOLUTION RESPIRATORY (INHALATION) at 14:04

## 2019-01-01 RX ADMIN — TUBERCULIN PURIFIED PROTEIN DERIVATIVE 5 UNITS: 5 INJECTION, SOLUTION INTRADERMAL at 11:49

## 2019-01-01 RX ADMIN — FUROSEMIDE 40 MG: 20 TABLET ORAL at 09:01

## 2019-01-01 RX ADMIN — Medication 10 ML: at 13:33

## 2019-01-01 RX ADMIN — SIMVASTATIN 20 MG: 20 TABLET, FILM COATED ORAL at 21:10

## 2019-01-01 RX ADMIN — PANTOPRAZOLE SODIUM 40 MG: 40 INJECTION, POWDER, FOR SOLUTION INTRAVENOUS at 10:00

## 2019-01-01 RX ADMIN — TRAMADOL HYDROCHLORIDE 50 MG: 50 TABLET, FILM COATED ORAL at 20:31

## 2019-01-01 RX ADMIN — PANTOPRAZOLE SODIUM 40 MG: 40 INJECTION, POWDER, FOR SOLUTION INTRAVENOUS at 20:45

## 2019-01-01 RX ADMIN — OXYCODONE HYDROCHLORIDE AND ACETAMINOPHEN 500 MG: 500 TABLET ORAL at 09:26

## 2019-01-01 RX ADMIN — ALBUTEROL SULFATE 2.5 MG: 2.5 SOLUTION RESPIRATORY (INHALATION) at 15:18

## 2019-01-01 RX ADMIN — GUAIFENESIN 1200 MG: 600 TABLET, EXTENDED RELEASE ORAL at 21:28

## 2019-01-01 RX ADMIN — ALBUTEROL SULFATE 2.5 MG: 2.5 SOLUTION RESPIRATORY (INHALATION) at 07:33

## 2019-01-01 RX ADMIN — ACETAMINOPHEN 650 MG: 325 TABLET, FILM COATED ORAL at 15:37

## 2019-01-01 RX ADMIN — CARVEDILOL 12.5 MG: 12.5 TABLET, FILM COATED ORAL at 17:51

## 2019-01-01 RX ADMIN — FUROSEMIDE 40 MG: 10 INJECTION, SOLUTION INTRAMUSCULAR; INTRAVENOUS at 08:29

## 2019-01-01 RX ADMIN — SIMVASTATIN 20 MG: 20 TABLET, FILM COATED ORAL at 22:00

## 2019-01-01 RX ADMIN — DOCUSATE SODIUM 100 MG: 100 CAPSULE, LIQUID FILLED ORAL at 09:28

## 2019-01-01 RX ADMIN — FUROSEMIDE 40 MG: 40 TABLET ORAL at 16:49

## 2019-01-01 RX ADMIN — FUROSEMIDE 40 MG: 10 INJECTION, SOLUTION INTRAMUSCULAR; INTRAVENOUS at 18:00

## 2019-01-01 RX ADMIN — CARVEDILOL 12.5 MG: 12.5 TABLET, FILM COATED ORAL at 18:00

## 2019-01-01 RX ADMIN — SERTRALINE HYDROCHLORIDE 50 MG: 50 TABLET ORAL at 10:11

## 2019-01-01 RX ADMIN — Medication 5 ML: at 22:16

## 2019-01-01 RX ADMIN — ALBUTEROL SULFATE 2.5 MG: 2.5 SOLUTION RESPIRATORY (INHALATION) at 07:24

## 2019-01-01 RX ADMIN — TRAZODONE HYDROCHLORIDE 50 MG: 50 TABLET ORAL at 22:50

## 2019-01-01 RX ADMIN — TRAMADOL HYDROCHLORIDE 50 MG: 50 TABLET, FILM COATED ORAL at 09:20

## 2019-01-01 RX ADMIN — IPRATROPIUM BROMIDE AND ALBUTEROL SULFATE 3 ML: .5; 3 SOLUTION RESPIRATORY (INHALATION) at 19:57

## 2019-01-01 RX ADMIN — EPOETIN ALFA-EPBX 20000 UNITS: 10000 INJECTION, SOLUTION INTRAVENOUS; SUBCUTANEOUS at 15:55

## 2019-01-01 RX ADMIN — Medication 10 ML: at 06:01

## 2019-01-01 RX ADMIN — SODIUM CHLORIDE 250 ML: 900 INJECTION, SOLUTION INTRAVENOUS at 11:25

## 2019-01-01 RX ADMIN — FUROSEMIDE 40 MG: 10 INJECTION, SOLUTION INTRAMUSCULAR; INTRAVENOUS at 18:09

## 2019-01-01 RX ADMIN — GUAIFENESIN 1200 MG: 600 TABLET, EXTENDED RELEASE ORAL at 08:35

## 2019-01-01 RX ADMIN — FERROUS GLUCONATE 1 TABLET: 324 TABLET ORAL at 18:04

## 2019-01-01 RX ADMIN — ETOMIDATE 2 MG: 2 INJECTION INTRAVENOUS at 14:17

## 2019-01-01 RX ADMIN — FUROSEMIDE 40 MG: 10 INJECTION, SOLUTION INTRAMUSCULAR; INTRAVENOUS at 08:26

## 2019-01-01 RX ADMIN — PANTOPRAZOLE SODIUM 40 MG: 40 TABLET, DELAYED RELEASE ORAL at 06:06

## 2019-01-01 RX ADMIN — CARVEDILOL 6.25 MG: 6.25 TABLET, FILM COATED ORAL at 16:48

## 2019-01-01 RX ADMIN — HEPARIN SODIUM 5550 UNITS: 5000 INJECTION, SOLUTION INTRAVENOUS; SUBCUTANEOUS at 11:42

## 2019-01-01 RX ADMIN — POLYETHYLENE GLYCOL 3350 17 G: 17 POWDER, FOR SOLUTION ORAL at 08:17

## 2019-01-01 RX ADMIN — DEXTROSE MONOHYDRATE AND SODIUM CHLORIDE 75 ML/HR: 5; .9 INJECTION, SOLUTION INTRAVENOUS at 21:00

## 2019-01-01 RX ADMIN — ALBUTEROL SULFATE 2.5 MG: 2.5 SOLUTION RESPIRATORY (INHALATION) at 19:10

## 2019-01-01 RX ADMIN — ALBUTEROL SULFATE 2.5 MG: 2.5 SOLUTION RESPIRATORY (INHALATION) at 08:00

## 2019-01-01 RX ADMIN — HEPARIN SODIUM 6 UNITS/KG/HR: 5000 INJECTION, SOLUTION INTRAVENOUS at 20:15

## 2019-01-01 RX ADMIN — POLYETHYLENE GLYCOL 3350 17 G: 17 POWDER, FOR SOLUTION ORAL at 17:17

## 2019-01-01 RX ADMIN — GUAIFENESIN 1200 MG: 600 TABLET, EXTENDED RELEASE ORAL at 21:15

## 2019-01-01 RX ADMIN — PANTOPRAZOLE SODIUM 40 MG: 40 TABLET, DELAYED RELEASE ORAL at 06:30

## 2019-01-01 RX ADMIN — DOCUSATE SODIUM 100 MG: 100 CAPSULE, LIQUID FILLED ORAL at 08:40

## 2019-01-01 RX ADMIN — BISACODYL 10 MG: 10 SUPPOSITORY RECTAL at 09:31

## 2019-01-01 RX ADMIN — HEPARIN SODIUM 5000 UNITS: 5000 INJECTION INTRAVENOUS; SUBCUTANEOUS at 22:53

## 2019-01-01 RX ADMIN — ALBUTEROL SULFATE 2.5 MG: 2.5 SOLUTION RESPIRATORY (INHALATION) at 07:11

## 2019-01-01 RX ADMIN — CARVEDILOL 6.25 MG: 6.25 TABLET, FILM COATED ORAL at 08:52

## 2019-01-01 RX ADMIN — ALBUTEROL SULFATE 2.5 MG: 2.5 SOLUTION RESPIRATORY (INHALATION) at 07:28

## 2019-01-01 RX ADMIN — GUAIFENESIN 1200 MG: 600 TABLET, EXTENDED RELEASE ORAL at 09:31

## 2019-01-01 RX ADMIN — HEPARIN SODIUM 10 UNITS/KG/HR: 5000 INJECTION, SOLUTION INTRAVENOUS at 06:56

## 2019-01-01 RX ADMIN — PROPOFOL 25 MCG/KG/MIN: 10 INJECTION, EMULSION INTRAVENOUS at 14:20

## 2019-01-01 RX ADMIN — BUDESONIDE 500 MCG: 0.5 INHALANT RESPIRATORY (INHALATION) at 22:30

## 2019-01-01 RX ADMIN — INSULIN LISPRO 2 UNITS: 100 INJECTION, SOLUTION INTRAVENOUS; SUBCUTANEOUS at 16:47

## 2019-01-01 RX ADMIN — ASPIRIN 81 MG: 81 TABLET ORAL at 08:10

## 2019-01-01 RX ADMIN — BUDESONIDE 500 MCG: 0.5 INHALANT RESPIRATORY (INHALATION) at 20:20

## 2019-01-01 RX ADMIN — SERTRALINE HYDROCHLORIDE 50 MG: 50 TABLET ORAL at 09:11

## 2019-01-01 RX ADMIN — FUROSEMIDE 40 MG: 20 TABLET ORAL at 09:59

## 2019-01-01 RX ADMIN — HYDROCODONE BITARTRATE AND ACETAMINOPHEN 1 TABLET: 5; 325 TABLET ORAL at 13:29

## 2019-01-01 RX ADMIN — BUDESONIDE 500 MCG: 0.5 INHALANT RESPIRATORY (INHALATION) at 19:34

## 2019-01-01 RX ADMIN — DOCUSATE SODIUM 100 MG: 100 CAPSULE, LIQUID FILLED ORAL at 09:29

## 2019-01-01 RX ADMIN — CARVEDILOL 12.5 MG: 12.5 TABLET, FILM COATED ORAL at 08:21

## 2019-01-01 RX ADMIN — ALBUTEROL SULFATE 2.5 MG: 2.5 SOLUTION RESPIRATORY (INHALATION) at 07:27

## 2019-01-01 RX ADMIN — ALBUTEROL SULFATE 2.5 MG: 2.5 SOLUTION RESPIRATORY (INHALATION) at 08:37

## 2019-01-01 RX ADMIN — ALBUTEROL SULFATE 2.5 MG: 2.5 SOLUTION RESPIRATORY (INHALATION) at 20:41

## 2019-01-01 RX ADMIN — HEPARIN SODIUM 3400 UNITS: 5000 INJECTION INTRAVENOUS; SUBCUTANEOUS at 16:04

## 2019-01-01 RX ADMIN — ALBUTEROL SULFATE 2.5 MG: 2.5 SOLUTION RESPIRATORY (INHALATION) at 00:29

## 2019-01-01 RX ADMIN — ONDANSETRON 4 MG: 2 INJECTION INTRAMUSCULAR; INTRAVENOUS at 14:52

## 2019-01-01 RX ADMIN — PANTOPRAZOLE SODIUM 40 MG: 40 TABLET, DELAYED RELEASE ORAL at 09:28

## 2019-01-01 RX ADMIN — CARVEDILOL 6.25 MG: 6.25 TABLET, FILM COATED ORAL at 17:24

## 2019-01-01 RX ADMIN — ALBUTEROL SULFATE 2.5 MG: 2.5 SOLUTION RESPIRATORY (INHALATION) at 11:23

## 2019-01-01 RX ADMIN — CARVEDILOL 12.5 MG: 12.5 TABLET, FILM COATED ORAL at 17:05

## 2019-01-01 RX ADMIN — PANTOPRAZOLE SODIUM 40 MG: 40 INJECTION, POWDER, FOR SOLUTION INTRAVENOUS at 20:54

## 2019-01-01 RX ADMIN — ALBUTEROL SULFATE 2.5 MG: 2.5 SOLUTION RESPIRATORY (INHALATION) at 07:26

## 2019-01-01 RX ADMIN — Medication 10 ML: at 22:36

## 2019-01-01 RX ADMIN — ASPIRIN 81 MG: 81 TABLET ORAL at 08:41

## 2019-01-01 RX ADMIN — CARVEDILOL 6.25 MG: 6.25 TABLET, FILM COATED ORAL at 17:25

## 2019-01-01 RX ADMIN — CARVEDILOL 12.5 MG: 12.5 TABLET, FILM COATED ORAL at 08:35

## 2019-01-01 RX ADMIN — DEXTROSE 50 % IN WATER (D50W) INTRAVENOUS SYRINGE 25 G: at 16:58

## 2019-01-01 RX ADMIN — HEPARIN SODIUM 14 UNITS/KG/HR: 5000 INJECTION, SOLUTION INTRAVENOUS at 06:58

## 2019-01-01 RX ADMIN — EPOETIN ALFA-EPBX 60000 UNITS: 40000 INJECTION, SOLUTION INTRAVENOUS; SUBCUTANEOUS at 12:56

## 2019-01-01 RX ADMIN — ALBUTEROL SULFATE 2.5 MG: 2.5 SOLUTION RESPIRATORY (INHALATION) at 08:14

## 2019-01-01 RX ADMIN — DOCUSATE SODIUM 100 MG: 100 CAPSULE, LIQUID FILLED ORAL at 09:20

## 2019-01-01 RX ADMIN — ISOSORBIDE MONONITRATE 30 MG: 30 TABLET, EXTENDED RELEASE ORAL at 08:53

## 2019-01-01 RX ADMIN — BUDESONIDE 500 MCG: 0.5 INHALANT RESPIRATORY (INHALATION) at 20:19

## 2019-01-01 RX ADMIN — ALBUTEROL SULFATE 2.5 MG: 2.5 SOLUTION RESPIRATORY (INHALATION) at 02:11

## 2019-01-01 RX ADMIN — FERROUS GLUCONATE 1 TABLET: 324 TABLET ORAL at 09:08

## 2019-01-01 RX ADMIN — Medication 5 ML: at 23:39

## 2019-01-01 RX ADMIN — ETOMIDATE 4 MG: 2 INJECTION INTRAVENOUS at 14:24

## 2019-01-01 RX ADMIN — Medication 10 ML: at 15:37

## 2019-01-01 RX ADMIN — HYDROCODONE BITARTRATE AND ACETAMINOPHEN 1 TABLET: 5; 325 TABLET ORAL at 08:53

## 2019-01-01 RX ADMIN — EPOETIN ALFA-EPBX 20000 UNITS: 10000 INJECTION, SOLUTION INTRAVENOUS; SUBCUTANEOUS at 13:27

## 2019-01-01 RX ADMIN — ALBUTEROL SULFATE 2.5 MG: 2.5 SOLUTION RESPIRATORY (INHALATION) at 11:12

## 2019-01-01 RX ADMIN — Medication 5 ML: at 21:26

## 2019-01-01 RX ADMIN — CARVEDILOL 6.25 MG: 6.25 TABLET, FILM COATED ORAL at 18:07

## 2019-01-01 RX ADMIN — ALBUTEROL SULFATE 2.5 MG: 2.5 SOLUTION RESPIRATORY (INHALATION) at 20:19

## 2019-01-01 RX ADMIN — Medication 10 ML: at 13:49

## 2019-01-01 RX ADMIN — PANTOPRAZOLE SODIUM 40 MG: 40 TABLET, DELAYED RELEASE ORAL at 08:35

## 2019-01-01 RX ADMIN — BUDESONIDE 500 MCG: 0.5 INHALANT RESPIRATORY (INHALATION) at 20:07

## 2019-01-01 RX ADMIN — Medication 10 ML: at 05:26

## 2019-01-01 RX ADMIN — SERTRALINE HYDROCHLORIDE 50 MG: 50 TABLET ORAL at 09:22

## 2019-01-01 RX ADMIN — ALBUTEROL SULFATE 2.5 MG: 2.5 SOLUTION RESPIRATORY (INHALATION) at 14:31

## 2019-01-01 RX ADMIN — WARFARIN SODIUM 2.5 MG: 1 TABLET ORAL at 17:28

## 2019-01-01 RX ADMIN — SIMVASTATIN 20 MG: 20 TABLET, FILM COATED ORAL at 21:56

## 2019-01-01 RX ADMIN — WARFARIN SODIUM 4 MG: 2 TABLET ORAL at 17:45

## 2019-01-01 RX ADMIN — NYSTATIN: 100000 POWDER TOPICAL at 09:30

## 2019-01-01 RX ADMIN — FERROUS GLUCONATE 1 TABLET: 324 TABLET ORAL at 07:40

## 2019-01-01 RX ADMIN — SIMVASTATIN 20 MG: 20 TABLET, FILM COATED ORAL at 21:54

## 2019-01-01 RX ADMIN — ASPIRIN 81 MG: 81 TABLET, COATED ORAL at 09:18

## 2019-01-01 RX ADMIN — FERROUS GLUCONATE 1 TABLET: 324 TABLET ORAL at 08:25

## 2019-01-01 RX ADMIN — ALBUTEROL SULFATE 2.5 MG: 2.5 SOLUTION RESPIRATORY (INHALATION) at 13:22

## 2019-01-01 RX ADMIN — CARVEDILOL 12.5 MG: 12.5 TABLET, FILM COATED ORAL at 09:28

## 2019-01-01 RX ADMIN — ACETAMINOPHEN 650 MG: 325 TABLET, FILM COATED ORAL at 08:15

## 2019-01-01 RX ADMIN — CARVEDILOL 6.25 MG: 6.25 TABLET, FILM COATED ORAL at 07:40

## 2019-01-01 RX ADMIN — WARFARIN SODIUM 2 MG: 2 TABLET ORAL at 09:34

## 2019-01-01 RX ADMIN — TRAMADOL HYDROCHLORIDE 50 MG: 50 TABLET, FILM COATED ORAL at 17:08

## 2019-01-01 RX ADMIN — SERTRALINE HYDROCHLORIDE 50 MG: 50 TABLET ORAL at 09:29

## 2019-01-01 RX ADMIN — FUROSEMIDE 40 MG: 10 INJECTION, SOLUTION INTRAMUSCULAR; INTRAVENOUS at 17:24

## 2019-01-01 RX ADMIN — DOCUSATE SODIUM 100 MG: 100 CAPSULE, LIQUID FILLED ORAL at 08:28

## 2019-01-01 RX ADMIN — CARVEDILOL 6.25 MG: 6.25 TABLET, FILM COATED ORAL at 17:06

## 2019-01-01 RX ADMIN — PANTOPRAZOLE SODIUM 40 MG: 40 INJECTION, POWDER, FOR SOLUTION INTRAVENOUS at 22:00

## 2019-01-01 RX ADMIN — SODIUM CHLORIDE 100 ML/HR: 450 INJECTION, SOLUTION INTRAVENOUS at 08:41

## 2019-01-01 RX ADMIN — BUDESONIDE 500 MCG: 0.5 INHALANT RESPIRATORY (INHALATION) at 07:24

## 2019-01-01 RX ADMIN — LIDOCAINE HYDROCHLORIDE 3 ML: 40 SOLUTION TOPICAL at 14:12

## 2019-01-01 RX ADMIN — WARFARIN SODIUM 3 MG: 2 TABLET ORAL at 17:29

## 2019-01-01 RX ADMIN — ACETAMINOPHEN 650 MG: 325 TABLET, FILM COATED ORAL at 04:46

## 2019-01-01 RX ADMIN — EPOETIN ALFA-EPBX 80000 UNITS: 40000 INJECTION, SOLUTION INTRAVENOUS; SUBCUTANEOUS at 12:16

## 2019-01-01 RX ADMIN — ACETAMINOPHEN 650 MG: 325 TABLET, FILM COATED ORAL at 11:28

## 2019-01-01 RX ADMIN — FUROSEMIDE 40 MG: 10 INJECTION, SOLUTION INTRAMUSCULAR; INTRAVENOUS at 16:45

## 2019-01-01 RX ADMIN — SIMVASTATIN 20 MG: 20 TABLET, FILM COATED ORAL at 21:01

## 2019-01-01 RX ADMIN — CARVEDILOL 6.25 MG: 6.25 TABLET, FILM COATED ORAL at 18:09

## 2019-01-01 RX ADMIN — TRAMADOL HYDROCHLORIDE 50 MG: 50 TABLET, FILM COATED ORAL at 05:44

## 2019-01-01 RX ADMIN — HYDROCODONE BITARTRATE AND ACETAMINOPHEN 1 TABLET: 5; 325 TABLET ORAL at 21:54

## 2019-01-01 RX ADMIN — BUDESONIDE 500 MCG: 0.5 INHALANT RESPIRATORY (INHALATION) at 20:02

## 2019-01-01 RX ADMIN — SERTRALINE HYDROCHLORIDE 50 MG: 50 TABLET ORAL at 10:53

## 2019-01-01 RX ADMIN — Medication 10 ML: at 22:47

## 2019-01-01 RX ADMIN — BUDESONIDE 500 MCG: 0.5 INHALANT RESPIRATORY (INHALATION) at 08:24

## 2019-01-01 RX ADMIN — ALBUTEROL SULFATE 2.5 MG: 2.5 SOLUTION RESPIRATORY (INHALATION) at 14:44

## 2019-01-01 RX ADMIN — ALBUTEROL SULFATE 2.5 MG: 2.5 SOLUTION RESPIRATORY (INHALATION) at 20:21

## 2019-01-01 RX ADMIN — ALBUTEROL SULFATE 2.5 MG: 2.5 SOLUTION RESPIRATORY (INHALATION) at 15:28

## 2019-01-01 RX ADMIN — SERTRALINE HYDROCHLORIDE 50 MG: 50 TABLET ORAL at 09:31

## 2019-01-01 RX ADMIN — SERTRALINE HYDROCHLORIDE 50 MG: 50 TABLET ORAL at 09:28

## 2019-01-01 RX ADMIN — ALBUTEROL SULFATE 2.5 MG: 2.5 SOLUTION RESPIRATORY (INHALATION) at 07:41

## 2019-01-01 RX ADMIN — TRAMADOL HYDROCHLORIDE 50 MG: 50 TABLET, FILM COATED ORAL at 03:14

## 2019-01-01 RX ADMIN — ALBUTEROL SULFATE 2.5 MG: 2.5 SOLUTION RESPIRATORY (INHALATION) at 16:29

## 2019-01-01 RX ADMIN — CARVEDILOL 12.5 MG: 12.5 TABLET, FILM COATED ORAL at 18:16

## 2019-01-01 RX ADMIN — FERROUS GLUCONATE 1 TABLET: 324 TABLET ORAL at 09:59

## 2019-01-01 RX ADMIN — ASPIRIN 81 MG: 81 TABLET ORAL at 08:25

## 2019-01-01 RX ADMIN — TRAMADOL HYDROCHLORIDE 50 MG: 50 TABLET, FILM COATED ORAL at 09:32

## 2019-01-01 RX ADMIN — GUAIFENESIN 1200 MG: 600 TABLET, EXTENDED RELEASE ORAL at 22:36

## 2019-01-01 RX ADMIN — GUAIFENESIN 1200 MG: 600 TABLET, EXTENDED RELEASE ORAL at 09:27

## 2019-01-01 RX ADMIN — ALBUTEROL SULFATE 2.5 MG: 2.5 SOLUTION RESPIRATORY (INHALATION) at 07:40

## 2019-01-01 RX ADMIN — ALBUTEROL SULFATE 2.5 MG: 2.5 SOLUTION RESPIRATORY (INHALATION) at 13:37

## 2019-01-01 RX ADMIN — FERROUS GLUCONATE 1 TABLET: 324 TABLET ORAL at 17:17

## 2019-01-01 RX ADMIN — SERTRALINE HYDROCHLORIDE 50 MG: 50 TABLET ORAL at 08:16

## 2019-01-01 RX ADMIN — PANTOPRAZOLE SODIUM 40 MG: 40 INJECTION, POWDER, FOR SOLUTION INTRAVENOUS at 08:53

## 2019-01-01 RX ADMIN — ALBUTEROL SULFATE 2.5 MG: 2.5 SOLUTION RESPIRATORY (INHALATION) at 15:39

## 2019-01-01 RX ADMIN — CARVEDILOL 12.5 MG: 12.5 TABLET, FILM COATED ORAL at 08:10

## 2019-01-01 RX ADMIN — TRAZODONE HYDROCHLORIDE 50 MG: 50 TABLET ORAL at 20:02

## 2019-01-01 RX ADMIN — ALBUTEROL SULFATE 2.5 MG: 2.5 SOLUTION RESPIRATORY (INHALATION) at 23:02

## 2019-01-01 RX ADMIN — FERROUS GLUCONATE 1 TABLET: 324 TABLET ORAL at 10:00

## 2019-01-01 RX ADMIN — ALBUTEROL SULFATE 2.5 MG: 2.5 SOLUTION RESPIRATORY (INHALATION) at 23:28

## 2019-01-01 RX ADMIN — TRAMADOL HYDROCHLORIDE 50 MG: 50 TABLET, FILM COATED ORAL at 17:34

## 2019-01-01 RX ADMIN — SODIUM CHLORIDE, SODIUM LACTATE, POTASSIUM CHLORIDE, AND CALCIUM CHLORIDE 100 ML/HR: 600; 310; 30; 20 INJECTION, SOLUTION INTRAVENOUS at 13:46

## 2019-01-01 RX ADMIN — PANTOPRAZOLE SODIUM 40 MG: 40 INJECTION, POWDER, FOR SOLUTION INTRAVENOUS at 22:05

## 2019-01-01 RX ADMIN — FUROSEMIDE 80 MG: 40 TABLET ORAL at 09:13

## 2019-01-01 RX ADMIN — CARVEDILOL 12.5 MG: 12.5 TABLET, FILM COATED ORAL at 17:24

## 2019-01-01 RX ADMIN — BUDESONIDE 500 MCG: 0.5 INHALANT RESPIRATORY (INHALATION) at 07:49

## 2019-01-01 RX ADMIN — TRAZODONE HYDROCHLORIDE 50 MG: 50 TABLET ORAL at 21:51

## 2019-01-01 RX ADMIN — HEPARIN SODIUM 8 UNITS/KG/HR: 5000 INJECTION, SOLUTION INTRAVENOUS at 18:10

## 2019-01-01 RX ADMIN — PATIROMER 8.4 G: 8.4 POWDER, FOR SUSPENSION ORAL at 09:59

## 2019-01-01 RX ADMIN — ALBUTEROL SULFATE 2.5 MG: 2.5 SOLUTION RESPIRATORY (INHALATION) at 20:05

## 2019-01-01 RX ADMIN — ALBUTEROL SULFATE 2.5 MG: 2.5 SOLUTION RESPIRATORY (INHALATION) at 11:10

## 2019-01-01 RX ADMIN — PANTOPRAZOLE SODIUM 40 MG: 40 INJECTION, POWDER, FOR SOLUTION INTRAVENOUS at 08:40

## 2019-01-01 RX ADMIN — FUROSEMIDE 40 MG: 20 TABLET ORAL at 08:47

## 2019-01-01 RX ADMIN — FUROSEMIDE 40 MG: 10 INJECTION, SOLUTION INTRAMUSCULAR; INTRAVENOUS at 08:48

## 2019-01-01 RX ADMIN — Medication 10 ML: at 15:11

## 2019-01-01 RX ADMIN — HEPARIN SODIUM 12 UNITS/KG/HR: 5000 INJECTION, SOLUTION INTRAVENOUS at 12:34

## 2019-01-01 RX ADMIN — HEPARIN SODIUM 10 UNITS/KG/HR: 5000 INJECTION, SOLUTION INTRAVENOUS at 17:47

## 2019-01-01 RX ADMIN — Medication 10 ML: at 15:14

## 2019-01-01 RX ADMIN — ALBUTEROL SULFATE 2.5 MG: 2.5 SOLUTION RESPIRATORY (INHALATION) at 07:44

## 2019-01-01 RX ADMIN — TRAZODONE HYDROCHLORIDE 50 MG: 50 TABLET ORAL at 20:44

## 2019-01-01 RX ADMIN — Medication 10 ML: at 21:20

## 2019-01-01 RX ADMIN — ALBUTEROL SULFATE 2.5 MG: 2.5 SOLUTION RESPIRATORY (INHALATION) at 21:28

## 2019-01-01 RX ADMIN — ALBUTEROL SULFATE 2.5 MG: 2.5 SOLUTION RESPIRATORY (INHALATION) at 16:23

## 2019-01-01 RX ADMIN — TRAZODONE HYDROCHLORIDE 50 MG: 50 TABLET ORAL at 22:00

## 2019-01-01 RX ADMIN — Medication 5 ML: at 06:46

## 2019-01-01 RX ADMIN — ASPIRIN 81 MG: 81 TABLET, COATED ORAL at 09:25

## 2019-01-01 RX ADMIN — Medication 10 ML: at 22:05

## 2019-01-01 RX ADMIN — ALBUTEROL SULFATE 2.5 MG: 2.5 SOLUTION RESPIRATORY (INHALATION) at 13:59

## 2019-01-01 RX ADMIN — ISOSORBIDE MONONITRATE 30 MG: 30 TABLET, EXTENDED RELEASE ORAL at 08:40

## 2019-01-01 RX ADMIN — POLYETHYLENE GLYCOL 3350 17 G: 17 POWDER, FOR SOLUTION ORAL at 17:28

## 2019-01-01 RX ADMIN — FUROSEMIDE 80 MG: 40 TABLET ORAL at 09:31

## 2019-01-01 RX ADMIN — ALBUTEROL SULFATE 2.5 MG: 2.5 SOLUTION RESPIRATORY (INHALATION) at 11:54

## 2019-01-01 RX ADMIN — GUAIFENESIN 1200 MG: 600 TABLET, EXTENDED RELEASE ORAL at 08:20

## 2019-01-01 RX ADMIN — SODIUM CHLORIDE 250 ML: 900 INJECTION, SOLUTION INTRAVENOUS at 18:10

## 2019-01-01 RX ADMIN — Medication 10 ML: at 21:56

## 2019-01-01 RX ADMIN — ASPIRIN 81 MG: 81 TABLET ORAL at 08:38

## 2019-01-01 RX ADMIN — TRAZODONE HYDROCHLORIDE 50 MG: 50 TABLET ORAL at 22:05

## 2019-01-01 RX ADMIN — FUROSEMIDE 40 MG: 10 INJECTION, SOLUTION INTRAMUSCULAR; INTRAVENOUS at 08:00

## 2019-01-01 RX ADMIN — BUDESONIDE 500 MCG: 0.5 INHALANT RESPIRATORY (INHALATION) at 20:41

## 2019-01-01 RX ADMIN — HEPARIN SODIUM 3200 UNITS: 5000 INJECTION INTRAVENOUS; SUBCUTANEOUS at 03:35

## 2019-01-01 RX ADMIN — ASPIRIN 81 MG: 81 TABLET ORAL at 08:28

## 2019-01-01 RX ADMIN — FUROSEMIDE 40 MG: 10 INJECTION, SOLUTION INTRAMUSCULAR; INTRAVENOUS at 19:52

## 2019-01-01 RX ADMIN — SERTRALINE HYDROCHLORIDE 50 MG: 50 TABLET ORAL at 08:28

## 2019-01-01 RX ADMIN — SIMVASTATIN 20 MG: 20 TABLET, FILM COATED ORAL at 22:03

## 2019-01-01 RX ADMIN — WARFARIN SODIUM 2.5 MG: 1 TABLET ORAL at 18:07

## 2019-01-01 RX ADMIN — SERTRALINE HYDROCHLORIDE 50 MG: 50 TABLET ORAL at 09:08

## 2019-01-01 RX ADMIN — FUROSEMIDE 40 MG: 10 INJECTION, SOLUTION INTRAMUSCULAR; INTRAVENOUS at 21:30

## 2019-01-01 RX ADMIN — ALBUTEROL SULFATE 2.5 MG: 2.5 SOLUTION RESPIRATORY (INHALATION) at 14:23

## 2019-01-01 RX ADMIN — TRAMADOL HYDROCHLORIDE 50 MG: 50 TABLET, FILM COATED ORAL at 14:25

## 2019-01-01 RX ADMIN — BUDESONIDE 500 MCG: 0.5 INHALANT RESPIRATORY (INHALATION) at 19:20

## 2019-01-01 RX ADMIN — HEPARIN SODIUM 3350 UNITS: 5000 INJECTION INTRAVENOUS; SUBCUTANEOUS at 01:19

## 2019-01-01 RX ADMIN — FERROUS GLUCONATE 1 TABLET: 324 TABLET ORAL at 18:32

## 2019-01-01 RX ADMIN — FERROUS GLUCONATE 1 TABLET: 324 TABLET ORAL at 09:18

## 2019-01-01 RX ADMIN — Medication 10 ML: at 05:15

## 2019-01-01 RX ADMIN — CARVEDILOL 6.25 MG: 6.25 TABLET, FILM COATED ORAL at 17:29

## 2019-01-01 RX ADMIN — HEPARIN SODIUM 3450 UNITS: 5000 INJECTION, SOLUTION INTRAVENOUS; SUBCUTANEOUS at 14:47

## 2019-01-01 RX ADMIN — WARFARIN SODIUM 4 MG: 2 TABLET ORAL at 18:13

## 2019-01-01 RX ADMIN — PANTOPRAZOLE SODIUM 40 MG: 40 INJECTION, POWDER, FOR SOLUTION INTRAVENOUS at 21:51

## 2019-01-01 NOTE — PROGRESS NOTES
12/31/18 1908 Dual Skin Pressure Injury Assessment Dual Skin Pressure Injury Assessment X Second Care Provider (Based on [address] [place] [address]) DEWAYNE Nuno Dual skin assessment completed. Scar to right buttock pt reports was an old pressure area. Scar to mid chest pt reports from previous surgery. Pacemaker to left upper chest with healed scars. Compression dressing to left lower extremity from knee down. Able to see open wound directly below knee pt reports wound opens with swelling. Pt would not allow removal as dressing was placed today. Wound care consulted.

## 2019-01-01 NOTE — PROGRESS NOTES
Progress Note 2019 Admit Date: 2018 12:43 PM  
NAME: Mariola Montesinos :  1950 MRN:  [id number] Attending: Catherine Pool DO 
PCP:  Ernie Garibay MD 
Treatment Team: Attending Provider: Catherine Pool DO; Consulting Provider: Vinicius Webb MD 
 
Full Code SUBJECTIVE:  
Sagrario Orr is a 76 yo female with PMH of CAD s/p cardiac arrest 6840, chronic systolic CHF, CKD 3, COPD, HTN, hyperlipidemia, ICD, MDS, pulmonary HTN, s/p mechanical AVR on coumadin, on trilogy at night, 3 L O2 during the day who presented with c/o increased SOB X2-3 days without LE edema or weight gain. BNP 1151, CXR with mild CHF. Echo 2018 with EF 25% with severe diffuse hyopkinesis. Had recent admission 2018 for CHF exacerbation. She was given IV lasix with -825 output. Reports dyspnea improved, she feels back to her baseline. Past Medical History:  
Diagnosis Date  Anticoagulated on Coumadin 2013 S/P AVR  CAD (coronary artery disease) 2013 PCI LAD with stent placed  Cardiomyopathy ([address]  CHF (congestive heart failure) ([facility] 2017  CKD (chronic kidney disease) stage 3, GFR 30-59 ml/min ([facility]) 7/10/2013  COPD (chronic obstructive pulmonary disease) ([address] 2014  Essential hypertension, benign 2013  HLD (hyperlipidemia)  ICD (implantable cardioverter-defibrillator) in place 10/2/2014 Biotronik single-chamber ICD implantation 10/20/14  Iron deficiency anemia due to chronic blood loss 2009  MDS (myelodysplastic syndrome) ([facility] 2011 Procrit started in 11 Procrit weekly and Iron stores. 12 good response to 3 weekly procrit did not need it last time  3--13 Pt doing well. Just wanted a \"check-up. \" Responding to Procrit every three weeks.  13 patient has missed some injections on recently restarted hemoglobin only issue , takes oral iron iron stores each time  REJI (obstructive sleep apnea)-cpap 4/2/2014  Osteoarthritis  Osteopenia  Pulmonary hypertension ([address] 6/15/2016  Quadrantanopia  Skin defect 11/1/2018  Visual complaint 12/14/2015 Recent Results (from the past 24 hour(s)) EKG, 12 LEAD, INITIAL Collection Time: 12/31/18 12:52 PM  
Result Value Ref Range Ventricular Rate 80 BPM  
 Atrial Rate 80 BPM  
 P-R Interval 168 ms QRS Duration 126 ms  
 Q-T Interval 376 ms QTC Calculation (Bezet) 433 ms Calculated P Axis 36 degrees Calculated R Axis -45 degrees Calculated T Axis 118 degrees Diagnosis    
  !! AGE AND GENDER SPECIFIC ECG ANALYSIS !! Sinus rhythm with occasional Premature ventricular complexes Left axis deviation Left bundle branch block Abnormal ECG When compared with ECG of 14-NOV-2018 10:18, 
Premature ventricular complexes are now Present Left bundle branch block has replaced Non-specific intra-ventricular  
conduction block Minimal criteria for Anterior infarct are no longer Present CBC WITH AUTOMATED DIFF Collection Time: 12/31/18 12:56 PM  
Result Value Ref Range WBC 8.8 4.3 - 11.1 K/uL  
 RBC 2.98 (L) 4.05 - 5.2 M/uL HGB 8.5 (L) 11.7 - 15.4 g/dL HCT 28.5 (L) 35.8 - 46.3 % MCV 95.6 79.6 - 97.8 FL  
 MCH 28.5 26.1 - 32.9 PG  
 MCHC 29.8 (L) 31.4 - 35.0 g/dL  
 RDW 15.5 (H) 11.9 - 14.6 % PLATELET 873 537 - 208 K/uL MPV 11.0 9.4 - 12.3 FL ABSOLUTE NRBC 0.02 0.0 - 0.2 K/uL  
 DF AUTOMATED NEUTROPHILS 82 (H) 43 - 78 % LYMPHOCYTES 10 (L) 13 - 44 % MONOCYTES 6 4.0 - 12.0 % EOSINOPHILS 1 0.5 - 7.8 % BASOPHILS 1 0.0 - 2.0 % IMMATURE GRANULOCYTES 1 0.0 - 5.0 %  
 ABS. NEUTROPHILS 7.2 1.7 - 8.2 K/UL  
 ABS. LYMPHOCYTES 0.9 0.5 - 4.6 K/UL  
 ABS. MONOCYTES 0.5 0.1 - 1.3 K/UL  
 ABS. EOSINOPHILS 0.1 0.0 - 0.8 K/UL  
 ABS. BASOPHILS 0.1 0.0 - 0.2 K/UL  
 ABS. IMM. GRANS. 0.1 0.0 - 0.5 K/UL METABOLIC PANEL, COMPREHENSIVE  
 Collection Time: 12/31/18 12:56 PM  
Result Value Ref Range Sodium 143 136 - 145 mmol/L Potassium 3.7 3.5 - 5.1 mmol/L Chloride 100 98 - 107 mmol/L  
 CO2 36 (H) 21 - 32 mmol/L Anion gap 7 7 - 16 mmol/L Glucose 206 (H) 65 - 100 mg/dL BUN 20 8 - 23 MG/DL Creatinine 1.26 (H) 0.6 - 1.0 MG/DL  
 GFR est AA 54 (L) >60 ml/min/1.73m2 GFR est non-AA 45 (L) >60 ml/min/1.73m2 Calcium 9.4 8.3 - 10.4 MG/DL Bilirubin, total 0.6 0.2 - 1.1 MG/DL  
 ALT (SGPT) 18 12 - 65 U/L  
 AST (SGOT) 13 (L) 15 - 37 U/L Alk. phosphatase 59 50 - 136 U/L Protein, total 7.0 6.3 - 8.2 g/dL Albumin 3.3 3.2 - 4.6 g/dL Globulin 3.7 (H) 2.3 - 3.5 g/dL A-G Ratio 0.9 (L) 1.2 - 3.5    
TROPONIN I Collection Time: 12/31/18 12:56 PM  
Result Value Ref Range Troponin-I, Qt. 0.05 0.02 - 0.05 NG/ML  
BNP Collection Time: 12/31/18 12:56 PM  
Result Value Ref Range BNP 1,151 (H) 0 pg/mL PROTHROMBIN TIME + INR Collection Time: 12/31/18 12:56 PM  
Result Value Ref Range Prothrombin time 21.5 (H) 11.7 - 14.5 sec INR 1.9 URINALYSIS W/ RFLX MICROSCOPIC Collection Time: 12/31/18  2:54 PM  
Result Value Ref Range Color YELLOW Appearance CLEAR Specific gravity 1.017 1.001 - 1.023    
 pH (UA) 5.5 5.0 - 9.0 Protein 100 (A) NEG mg/dL Glucose NEGATIVE  mg/dL Ketone NEGATIVE  NEG mg/dL Bilirubin NEGATIVE  NEG Blood NEGATIVE  NEG Urobilinogen 1.0 0.2 - 1.0 EU/dL Nitrites NEGATIVE  NEG Leukocyte Esterase NEGATIVE  NEG    
 WBC 0 0 /hpf  
 RBC 0-3 0 /hpf Epithelial cells 0-3 0 /hpf Bacteria 0 0 /hpf Casts 0-3 0 /lpf  
GLUCOSE, POC Collection Time: 12/31/18  9:26 PM  
Result Value Ref Range Glucose (POC) 155 (H) 65 - 100 mg/dL CBC W/O DIFF Collection Time: 01/01/19  6:15 AM  
Result Value Ref Range WBC 6.7 4.3 - 11.1 K/uL  
 RBC 3.21 (L) 4.05 - 5.2 M/uL HGB 8.9 (L) 11.7 - 15.4 g/dL HCT 30.4 (L) 35.8 - 46.3 % MCV 94.7 79.6 - 97.8 FL  
 MCH 27.7 26.1 - 32.9 PG  
 MCHC 29.3 (L) 31.4 - 35.0 g/dL  
 RDW 15.7 (H) 11.9 - 14.6 % PLATELET 892 186 - 108 K/uL MPV 10.4 9.4 - 12.3 FL ABSOLUTE NRBC 0.00 0.0 - 0.2 K/uL METABOLIC PANEL, BASIC Collection Time: 01/01/19  6:15 AM  
Result Value Ref Range Sodium 143 136 - 145 mmol/L Potassium 3.9 3.5 - 5.1 mmol/L Chloride 99 98 - 107 mmol/L  
 CO2 40 (H) 21 - 32 mmol/L Anion gap 4 (L) 7 - 16 mmol/L Glucose 93 65 - 100 mg/dL BUN 22 8 - 23 MG/DL Creatinine 1.25 (H) 0.6 - 1.0 MG/DL  
 GFR est AA 54 (L) >60 ml/min/1.73m2 GFR est non-AA 45 (L) >60 ml/min/1.73m2 Calcium 9.6 8.3 - 10.4 MG/DL PROTHROMBIN TIME + INR Collection Time: 01/01/19  6:15 AM  
Result Value Ref Range Prothrombin time 20.8 (H) 11.7 - 14.5 sec INR 1.8 BNP Collection Time: 01/01/19  6:15 AM  
Result Value Ref Range BNP 1,313 (H) 0 pg/mL GLUCOSE, POC Collection Time: 01/01/19  8:27 AM  
Result Value Ref Range Glucose (POC) 101 (H) 65 - 100 mg/dL GLUCOSE, POC Collection Time: 01/01/19 11:55 AM  
Result Value Ref Range Glucose (POC) 137 (H) 65 - 100 mg/dL Allergies Allergen Reactions  Sulfa (Sulfonamide Antibiotics) Hives  Aspirin Nausea Only Cannot take uncoated aspirin Current Facility-Administered Medications Medication Dose Route Frequency Provider Last Rate Last Dose  furosemide (LASIX) injection 40 mg  40 mg IntraVENous DAILY CAROLINA Coronel      
 albuterol (PROVENTIL VENTOLIN) nebulizer solution 2.5 mg  2.5 mg Nebulization Q4H PRN Dede Torres C, DO      
 ascorbic acid (vitamin C) (VITAMIN C) tablet 500 mg  500 mg Oral DAILY Tanya Monroe C, DO   500 mg at 01/01/19 1380  
 aspirin delayed-release tablet 81 mg  81 mg Oral DAILY Pauly KELLER DO   81 mg at 01/01/19 9833  carvedilol (COREG) tablet 6.25 mg  6.25 mg Oral BID WITH MEALS Tanya Monroe DO   6.25 mg at 01/01/19 7270  cholecalciferol (VITAMIN D3) tablet 2,000 Units  2,000 Units Oral DAILY Dania Gina, DO   2,000 Units at 01/01/19 0925  
 docusate (COLACE) 50 mg/5 mL oral liquid 50 mg  50 mg Oral DAILY Tanya Monroe, DO   50 mg at 01/01/19 5868  ferrous gluconate 324 mg (38 mg iron) tablet 1 Tab  1 Tab Oral BID WITH MEALS Lyssa García DO   1 Tab at 01/01/19 3976  isosorbide mononitrate ER (IMDUR) tablet 30 mg  30 mg Oral DAILY Tanya Monroe DO   30 mg at 01/01/19 9571  loratadine (CLARITIN) tablet 10 mg  10 mg Oral DAILY PRN [facility] Hospitalbill Grimm DO      
 sacubitril-valsartan (ENTRESTO) 24-26 mg tablet 1 Tab  1 Tab Oral BID Yaya KELLER DO   1 Tab at 01/01/19 5372  sertraline (ZOLOFT) tablet 50 mg  50 mg Oral DAILY Tanya Monroe DO   50 mg at 01/01/19 4374  simvastatin (ZOCOR) tablet 20 mg  20 mg Oral QHS Lyssa Monroe DO   20 mg at 12/31/18 2120  spironolactone (ALDACTONE) tablet 25 mg  25 mg Oral DAILY Tanya Monroe, DO   25 mg at 01/01/19 8725  traMADol (ULTRAM) tablet 50 mg  50 mg Oral Q6H PRN Lyssa Monroe DO   50 mg at 12/31/18 2030  traZODone (DESYREL) tablet 50 mg  50 mg Oral QHS PRN Yaya KELLER DO  warfarin (COUMADIN) tablet 2.5 mg  2.5 mg Oral Once per day on Sun Mon Tue Wed Thu Sat Yaya KELLER DO   2.5 mg at 12/31/18 2120  [START ON 1/4/2019] warfarin (COUMADIN) tablet 5 mg  5 mg Oral Once per day on Fri Tanya Monroe,       
 sodium chloride (NS) flush 5-10 mL  5-10 mL IntraVENous Q8H Tanya Monroe, DO   10 mL at 01/01/19 1233  sodium chloride (NS) flush 5-10 mL  5-10 mL IntraVENous PRN Pratik Monroe, DO      
 acetaminophen (TYLENOL) tablet 650 mg  650 mg Oral Q4H PRN Tanya Monroe, DO      
 [facility]etron [facility]) injection 4 mg  4 mg IntraVENous Q4H PRN Tanya Monroe, DO      
 insulin lispro (HUMALOG) injection 0-10 Units  0-10 Units SubCUTAneous AC&HS Delsuki Justo, DO   Stopped at 19 0730  hydrALAZINE (APRESOLINE) 20 mg/mL injection 20 mg  20 mg IntraVENous Q6H PRN Tanya Monroe, DO      
 budesonide (PULMICORT) 500 mcg/2 ml nebulizer suspension  500 mcg Nebulization BID RT Scott Monroe, DO   500 mcg at 19 1939 And  
 albuterol (PROVENTIL VENTOLIN) nebulizer solution 2.5 mg  2.5 mg Nebulization Q6HWA RT Tanya Monroe, DO   2.5 mg at 19 6674 Review of Systems negative with exception of pertinent positives noted above PHYSICAL EXAM  
 
Visit Vitals /63 (BP 1 Location: Left arm, BP Patient Position: At rest) Pulse 78 Temp 98.2 °F (36.8 °C) Resp 20 Ht 5' 2\" (1.575 m) Wt 97.1 kg (214 lb) SpO2 94% BMI 39.14 kg/m² Temp (24hrs), Av.1 °F (36.7 °C), Min:97.4 °F (36.3 °C), Max:98.7 °F (37.1 °C) Oxygen Therapy O2 Sat (%): 94 % (19 1200) Pulse via Oximetry: 83 beats per minute (19 0745) O2 Device: Nasal cannula (18 2115) O2 Flow Rate (L/min): 3 l/min (19 0745) Intake/Output Summary (Last 24 hours) at 2019 1229 Last data filed at 2019 6078 Gross per 24 hour Intake  Output 825 ml Net -825 ml General: No acute distress   
Lungs: CTA bilaterally. Resp even and nonlabored Heart:  S1S2 present without murmurs rubs gallops. RRR. Trace LE edema Abdomen: Soft, non tender, non distended. BS present Extremities:  moves ext spontaneously. No cyanosis. Chronic wound LLE with dressing and compression wrap Neurologic:  A/O X4. No focal deficits. Results summary of Diagnostic Studies/Procedures copied from within [facility] EMR: 
 
 
 
ASSESSMENT Active Hospital Problems Diagnosis Date Noted  Acute on chronic combined systolic and diastolic CHF (congestive heart failure) ([facility] 2018  Debility 2018  Chronic respiratory failure with hypoxia ([facility] 2018  Type 2 diabetes mellitus with nephropathy ([facility] 12/18/2017  S/P AVR (aortic valve replacement) Mechanical/Artific  HLD (hyperlipidemia)  COPD (chronic obstructive pulmonary disease) ([facility] 04/02/2014 HOME OXYGEN 3 LITERS 
  
 CKD (chronic kidney disease) stage 3, GFR 30-59 ml/min ([facility]) 07/10/2013  Anticoagulated on Coumadin 07/09/2013 S/P AVR  Essential hypertension, benign 01/20/2013  MDS (myelodysplastic syndrome) ([facility] 12/17/2011 Procrit started in August, 2011 12/18/11 Procrit weekly and Iron stores. 5-12 12-13-12 good response to 3 weekly procrit did not need it last time 3-7-13 Pt doing well. Just wanted a \"check-up. \" Responding to Procrit every three weeks. 8-29-13 patient has missed some injections on recently restarted hemoglobin only issue , takes oral iron iron stores each time  Iron deficiency anemia due to chronic blood loss 07/29/2009 Plan: 
 
Acute on chronic combined systolic and diastolic CHF: 
EF 65% Continue with IV lasix Continue entresto, BB, aldactone Follow I/O Cardiology following MDS Hgb stable Continue current treatment HTN Continue BB, ARB 
 
CKD 3 Follow renal function with diuresis BMP in AM 
 
S/p AVR replacement Continue coumadin, pharmacy to dose CAD Continue BB, ASA, Statin Chronic respiratory failure with hypoxia On home trilogy at night, 3 L O2 during the day, continue Continue nebs Chronic LLE wound Wound care consulted for dressing changes and compression wrap Notes, labs, VS, diagnostic testing reviewed Time spent with pt 20 min DVT Prophylaxis: coumadin Plan of Care Discussed with: Supervising MD Dr. Link Morton, care team, pt, daughter at bedside Dejah Lopez NP

## 2019-01-01 NOTE — PROGRESS NOTES
New admission. Pt oriented to room and call light. Daughter present. Report given to oncoming nurse.

## 2019-01-01 NOTE — PROGRESS NOTES
Pt and daughter requested trilogy as pt uses at home. Respiratory aware and no trilogy availible. Pt and daughter discuss trilogy and decide not to go get home trilogy as pt missed some night at home as well. Daughter stated she would bring in morning.

## 2019-01-01 NOTE — CONSULTS
[facility] Cardiology Consult Date of  Admission: 12/31/2018 12:43 PM  
 
Primary Care Physician: Dr Nilam Centeno Primary Cardiologist: Dr Aundrea Steiner Referring Physician: Dr Blanco Donato Consulting Physician: Dr Karrie Milton CC/Reason for consult: CHF Vianca Roth is a 77 y.o. female admitted for Systolic CHF, acute on chronic ([address] She has a h/o CKD, COPD on trilogy at night and 3L O2 durign day, myelodysplastic syndrome w anemia, htn, hld, and CAD w cardiac arrest 5-2014 w LHC and PCI to LAD, cardiomyopathy s/p [facility] AVR (on coumadin) w echo  w EF 25% w severe diffuse hypokinesis (worse apical and septal) w [facility] AVR and mild MR- s/p Biotronik ICD. She was discharged 11-20 after CHF exacerbation. She has been compliant w meds and low NA diet. She has had increased dyspnea x 2-3 days without increased LE edema or weight gain, with dyspnea at all times even at rest, with a dry cough. No CP, dizziness, or ICD discharge. No F/C though she has had nasal congestion and orbital drainage. Dyspnea is temporarily improved after using her inhaled medications. She presented to the ER where EKG shows V pace w rate 80 and PVC, hgb 8.9 (baseline 8.4), , K 3.9, cr 1.25, INR 1.9, BNP 1151, CXR mild CHF. /70. She was given IV lasix, diuresed - 825 cc and her dyspnea is much improved. Patient Active Problem List  
Diagnosis Code  Iron deficiency anemia due to chronic blood loss D50.0  MDS (myelodysplastic syndrome) ([facility]) D46.9  CAD (coronary artery disease) I25.10  Chronic combined systolic and diastolic heart failure ([facility]) I50.42  
 Essential hypertension, benign I10  
 Anticoagulated on Coumadin Z51.81, Z79.01  
 CKD (chronic kidney disease) stage 3, GFR 30-59 ml/min ([facility]) N18.3  COPD (chronic obstructive pulmonary disease) ([facility]) J44.9  REJI (obstructive sleep apnea)-cpap G47.33  
 ICD (implantable cardioverter-defibrillator) in place Z95.810  
 Osteopenia M85.80  HLD (hyperlipidemia) E78.5  Osteoarthritis M19.90  
 S/P AVR (aortic valve replacement) Z95.2  Cardiomyopathy ([address] I42.9  Type 2 diabetes mellitus with nephropathy ([facility]) E11.21  
 Closed nondisplaced fracture of shaft of fifth metacarpal bone of left hand S62.357A  Physical debility R53.81  
 Encounter for immunization Z23  Anemia D64.9  Chronic respiratory failure with hypoxia ([facility]) J96.11  
 Debility R53.81  
 Acute exacerbation of CHF (congestive heart failure) ([facility]) I50.9  Systolic CHF, acute on chronic ([facility]) I50.23  
 Acute on chronic combined systolic and diastolic CHF (congestive heart failure) ([facility]) I50.43 Past Medical History:  
Diagnosis Date  Anticoagulated on Coumadin 7/9/2013 S/P AVR  CAD (coronary artery disease) 1/20/2013 5/8/14 PCI LAD with stent placed  Cardiomyopathy ([address]  CHF (congestive heart failure) ([facility] 2/17/2017  CKD (chronic kidney disease) stage 3, GFR 30-59 ml/min ([facility]) 7/10/2013  COPD (chronic obstructive pulmonary disease) ([address] 4/2/2014  Essential hypertension, benign 1/20/2013  HLD (hyperlipidemia)  ICD (implantable cardioverter-defibrillator) in place 10/2/2014 Biotronik single-chamber ICD implantation 10/20/14  Iron deficiency anemia due to chronic blood loss 7/29/2009  MDS (myelodysplastic syndrome) ([address] 12/17/2011 Procrit started in August, 2011 12/18/11 Procrit weekly and Iron stores. 5-12 12-13-12 good response to 3 weekly procrit did not need it last time  3-7-13 Pt doing well. Just wanted a \"check-up. \" Responding to Procrit every three weeks. 8-29-13 patient has missed some injections on recently restarted hemoglobin only issue , takes oral iron iron stores each time  REJI (obstructive sleep apnea)-cpap 4/2/2014  Osteoarthritis  Osteopenia  Pulmonary hypertension ([address] 6/15/2016  Quadrantanopia  Skin defect 11/1/2018  Visual complaint 12/14/2015 Past Surgical History:  
Procedure Laterality Date [address]    HX AORTIC VALVE REPLACEMENT  ,   
 mechanical valve   HX  SECTION    
 HX CORONARY STENT PLACEMENT  May, 2014 STEMI with Stent placement.  HX ENDOSCOPY  2009 EGD [address] CATHETERIZATION    HX PACEMAKER  10/2/2014 Biotronik ICD  HX TUBAL LIGATION    
 IR BX BONE MARROW DIAGNOSTIC  2011 Allergies Allergen Reactions  Sulfa (Sulfonamide Antibiotics) Hives  Aspirin Nausea Only Cannot take uncoated aspirin Family History Problem Relation Age of Onset  Heart Disease Mother CHF  Hypertension Mother  Kidney Disease Mother  Heart Disease Father CHF  Lung Disease Father  Diabetes Father  Cancer Father   
     prostate  Hypertension Father  Heart Attack Father  Heart Disease Maternal Aunt  Diabetes Brother  Coronary Artery Disease Other  Breast Cancer Neg Hx Social History Tobacco Use  Smoking status: Former Smoker Packs/day: 0.25 Years: 42.00 Pack years: 10.50 Types: Cigarettes Start date: 10/1/1956 Last attempt to quit: 2014 Years since quittin.6  Smokeless tobacco: Never Used Substance Use Topics  Alcohol use: No  
  Alcohol/week: 0.0 oz  
  
 
Current Facility-Administered Medications Medication Dose Route Frequency  albuterol (PROVENTIL VENTOLIN) nebulizer solution 2.5 mg  2.5 mg Nebulization Q4H PRN  
 ascorbic acid (vitamin C) (VITAMIN C) tablet 500 mg  500 mg Oral DAILY  aspirin delayed-release tablet 81 mg  81 mg Oral DAILY  carvedilol (COREG) tablet 6.25 mg  6.25 mg Oral BID WITH MEALS  cholecalciferol (VITAMIN D3) tablet 2,000 Units  2,000 Units Oral DAILY  docusate (COLACE) 50 mg/5 mL oral liquid 50 mg  50 mg Oral DAILY  ferrous gluconate 324 mg (38 mg iron) tablet 1 Tab  1 Tab Oral BID WITH MEALS  
  isosorbide mononitrate ER (IMDUR) tablet 30 mg  30 mg Oral DAILY  loratadine (CLARITIN) tablet 10 mg  10 mg Oral DAILY PRN  
 sacubitril-valsartan (ENTRESTO) 24-26 mg tablet 1 Tab  1 Tab Oral BID  sertraline (ZOLOFT) tablet 50 mg  50 mg Oral DAILY  simvastatin (ZOCOR) tablet 20 mg  20 mg Oral QHS  spironolactone (ALDACTONE) tablet 25 mg  25 mg Oral DAILY  traMADol (ULTRAM) tablet 50 mg  50 mg Oral Q6H PRN  
 traZODone (DESYREL) tablet 50 mg  50 mg Oral QHS PRN  
 warfarin (COUMADIN) tablet 2.5 mg  2.5 mg Oral Once per day on Sun Mon Tue Wed Thu Sat  [START ON 1/4/2019] warfarin (COUMADIN) tablet 5 mg  5 mg Oral Once per day on Fri  
 sodium chloride (NS) flush 5-10 mL  5-10 mL IntraVENous Q8H  
 sodium chloride (NS) flush 5-10 mL  5-10 mL IntraVENous PRN  
 acetaminophen (TYLENOL) tablet 650 mg  650 mg Oral Q4H PRN  
 ondansetron (ZOFRAN) injection 4 mg  4 mg IntraVENous Q4H PRN  
 furosemide (LASIX) injection 40 mg  40 mg IntraVENous DAILY  insulin lispro (HUMALOG) injection 0-10 Units  0-10 Units SubCUTAneous AC&HS  hydrALAZINE (APRESOLINE) 20 mg/mL injection 20 mg  20 mg IntraVENous Q6H PRN  
 budesonide (PULMICORT) 500 mcg/2 ml nebulizer suspension  500 mcg Nebulization BID RT And  
 albuterol (PROVENTIL VENTOLIN) nebulizer solution 2.5 mg  2.5 mg Nebulization Q6HWA RT Review of Symptoms: 
General: no weight change,  no weakness, fever or chills Skin: no rashes, lumps, or other skin changes HEENT: no headache, dizziness, lightheadedness, vision changes, hearing changes, tinnitus, vertigo, sinus pressure/pain, bleeding gums, sore throat, or hoarseness Neck: no swollen glands, goiter, pain or stiffness Respiratory: + dry cough, sputum, hemoptysis, + dyspnea, wheezing Cardiovascular: + as per HPI Gastrointestinal: no GERD, constipation, diarrhea, liver problems, or h/o GI bleed Urinary: no frequency, urgency , hematuria, burning/pain with urination, recent flank pain, polyuria, nocturia, or difficulty urinating Peripheral Vascular: no claudication, leg cramps, prior DVTs, swelling of calves, legs, or feet, color change, or swelling with redness or tenderness Musculoskeletal: + DJD Psychiatric: no depression or excessive stress Neurological: no sensory or motor loss, seizures, syncope, tremors, numbness, no dementia Hematologic: + MDS w anemia Endocrine: no thyroid problems, heat or cold intolerance, excessive sweating, polyuria, polydipsia, + diabetes. Physical Exam 
Vitals:  
 12/31/18 2306 01/01/19 7247 01/01/19 0732 01/01/19 0745 BP: 149/80 143/81 138/67 Pulse: 77 71 72 Resp: 20 19 18 Temp: 98.7 °F (37.1 °C) 97.8 °F (36.6 °C) 98 °F (36.7 °C) SpO2: 92% 92% 98% 98% Weight:      
Height:      
 
 
Physical Exam: 
General: Well Developed, Well Nourished, No Acute Distress, 3L O2 
HEENT: pupils equal and round, no abnormalities noted Neck: supple, no JVD, no carotid bruits Heart: S1S2 with RRR without murmurs or gallops Lungs: Crackles B bases Abd: soft, nontender, nondistended, with good bowel sounds Ext: warm, trace edema Skin: warm and dry Psychiatric: Normal mood and affect Neurologic: Alert and oriented X 3 Labs:  
Recent Labs 01/01/19 
0615 12/31/18 
1256  143  
K 3.9 3.7 BUN 22 20 CREA 1.25* 1.26* GLU 93 206* WBC 6.7 8.8 HGB 8.9* 8.5* HCT 30.4* 28.5*  
 194 INR 1.8 1.9 Assessment/Plan: 
 
 Assessment:  
Acute on chronic combined systolic and diastolic CHF (congestive heart failure)- EF 25% w mech AVR, mild MR- Cont IV diuresis (still w crackles on exam) but dyspnea improved, cont ARB (entresto), BB, aldactone, monitor I&O, renal function w diuresis, if improved possibly d/c in AM  
 
MDS (myelodysplastic syndrome) ([facility]- w anemia, hgb stable, cont current meds Essential hypertension, benign (1/20/2013)- Cont BB, ARB CKD (chronic kidney disease) stage 3, GFR 30-59 ml/min ([facility]) - monitor w diuresis COPD - HOME OXYGEN 3 LITERS, cont current meds HLD (hyperlipidemia) ()- statin S/P AVR (aortic valve replacement)- Coumadin Type 2 diabetes mellitus with nephropathy ([facility] (12/18/2017)- Cont current meds Chronic respiratory failure with hypoxia ([facility] (9/7/2018) CAD- PCI to LAD 5-2014- cont ASA, BB, ARB, statin Thank you very much for this referral. We appreciate the opportunity to participate in this patient's care. We will follow along with above stated plan. Leandra Snyder PA-C Consulting MD: Cydney Cee ATTENDING ADDENDUM: 
 
Patient seen and examined by me. Agree with above note by physician extender. Key findings are:  No CP or palpitations, CHF symptoms rapidly improving with diuresis. CV- RRR with crisp AVR click, 1/6 ONEIL RUSB, JVD 8-10cm Lungs- Clear bilaterally apically, faint bibasilar crackles Abd- soft, nontender, nondistended Ext-trace LE edema Plan: As above. Continue lasix BID today, will probably be ready to go home tomorrow if continues to diurese and improve from CHF/respiratory standpoint. Deshaun Branch MD 
[facility] Cardiology Pager [phone]

## 2019-01-01 NOTE — PROGRESS NOTES
Hourly rounding completed on this shift. No new complaints at this time. All needs met. Pt is currently resting in bed with eyes closed. PRN pain medication given to manage chronic pain. Will continue to monitor and give report to oncoming nurse.

## 2019-01-01 NOTE — WOUND CARE
Patient seen for compression wrap and wound dressing change to left leg/knee wound. Patient seen yesterday in wound clinic for dressing and wrap. She stated next dressing due tomorrow but she also has a wound clinic appt. on Thursday. Will follow up tomorrow. Noted photo and dressing per wound clinic note from wound clinic.

## 2019-01-02 ENCOUNTER — PATIENT OUTREACH (OUTPATIENT)
Dept: CASE MANAGEMENT | Age: 69
End: 2019-01-02

## 2019-01-02 VITALS
DIASTOLIC BLOOD PRESSURE: 108 MMHG | HEART RATE: 74 BPM | OXYGEN SATURATION: 91 % | RESPIRATION RATE: 22 BRPM | SYSTOLIC BLOOD PRESSURE: 210 MMHG | TEMPERATURE: 97.8 F

## 2019-01-02 VITALS
DIASTOLIC BLOOD PRESSURE: 81 MMHG | OXYGEN SATURATION: 95 % | RESPIRATION RATE: 18 BRPM | WEIGHT: 197 LBS | TEMPERATURE: 98.7 F | SYSTOLIC BLOOD PRESSURE: 149 MMHG | HEART RATE: 73 BPM | HEIGHT: 62 IN | BODY MASS INDEX: 36.25 KG/M2

## 2019-01-02 LAB
ANION GAP SERPL CALC-SCNC: 7 MMOL/L (ref 7–16)
BUN SERPL-MCNC: 29 MG/DL (ref 8–23)
CALCIUM SERPL-MCNC: 9.4 MG/DL (ref 8.3–10.4)
CHLORIDE SERPL-SCNC: 97 MMOL/L (ref 98–107)
CO2 SERPL-SCNC: 37 MMOL/L (ref 21–32)
CREAT SERPL-MCNC: 1.45 MG/DL (ref 0.6–1)
GLUCOSE BLD STRIP.AUTO-MCNC: 112 MG/DL (ref 65–100)
GLUCOSE BLD STRIP.AUTO-MCNC: 118 MG/DL (ref 65–100)
GLUCOSE SERPL-MCNC: 102 MG/DL (ref 65–100)
INR PPP: 1.7
MAGNESIUM SERPL-MCNC: 2 MG/DL (ref 1.8–2.4)
POTASSIUM SERPL-SCNC: 3.8 MMOL/L (ref 3.5–5.1)
PROTHROMBIN TIME: 19.3 SEC (ref 11.7–14.5)
SODIUM SERPL-SCNC: 141 MMOL/L (ref 136–145)

## 2019-01-02 PROCEDURE — 80048 BASIC METABOLIC PNL TOTAL CA: CPT

## 2019-01-02 PROCEDURE — 3331090002 HH PPS REVENUE DEBIT

## 2019-01-02 PROCEDURE — 74011250637 HC RX REV CODE- 250/637: Performed by: INTERNAL MEDICINE

## 2019-01-02 PROCEDURE — 74011000250 HC RX REV CODE- 250: Performed by: INTERNAL MEDICINE

## 2019-01-02 PROCEDURE — 77010033678 HC OXYGEN DAILY

## 2019-01-02 PROCEDURE — 36415 COLL VENOUS BLD VENIPUNCTURE: CPT

## 2019-01-02 PROCEDURE — 94640 AIRWAY INHALATION TREATMENT: CPT

## 2019-01-02 PROCEDURE — 83735 ASSAY OF MAGNESIUM: CPT

## 2019-01-02 PROCEDURE — 3331090001 HH PPS REVENUE CREDIT

## 2019-01-02 PROCEDURE — 76450000000

## 2019-01-02 PROCEDURE — 85610 PROTHROMBIN TIME: CPT

## 2019-01-02 PROCEDURE — 82962 GLUCOSE BLOOD TEST: CPT

## 2019-01-02 RX ORDER — FUROSEMIDE 40 MG/1
40 TABLET ORAL
Status: DISCONTINUED | OUTPATIENT
Start: 2019-01-02 | End: 2019-01-02 | Stop reason: HOSPADM

## 2019-01-02 RX ADMIN — ISOSORBIDE MONONITRATE 30 MG: 30 TABLET, EXTENDED RELEASE ORAL at 09:50

## 2019-01-02 RX ADMIN — VITAMIN D, TAB 1000IU (100/BT) 2000 UNITS: 25 TAB at 09:50

## 2019-01-02 RX ADMIN — SERTRALINE HYDROCHLORIDE 50 MG: 50 TABLET ORAL at 09:50

## 2019-01-02 RX ADMIN — ASPIRIN 81 MG: 81 TABLET, COATED ORAL at 09:50

## 2019-01-02 RX ADMIN — CARVEDILOL 6.25 MG: 6.25 TABLET, FILM COATED ORAL at 09:49

## 2019-01-02 RX ADMIN — ALBUTEROL SULFATE 2.5 MG: 2.5 SOLUTION RESPIRATORY (INHALATION) at 08:12

## 2019-01-02 RX ADMIN — OXYCODONE HYDROCHLORIDE AND ACETAMINOPHEN 500 MG: 500 TABLET ORAL at 09:49

## 2019-01-02 RX ADMIN — FERROUS GLUCONATE 1 TABLET: 324 TABLET ORAL at 09:50

## 2019-01-02 RX ADMIN — Medication 10 ML: at 06:01

## 2019-01-02 RX ADMIN — SPIRONOLACTONE 25 MG: 25 TABLET, FILM COATED ORAL at 09:50

## 2019-01-02 RX ADMIN — ALBUTEROL SULFATE 2.5 MG: 2.5 SOLUTION RESPIRATORY (INHALATION) at 14:09

## 2019-01-02 RX ADMIN — FUROSEMIDE 40 MG: 40 TABLET ORAL at 09:50

## 2019-01-02 RX ADMIN — SACUBITRIL AND VALSARTAN 1 TABLET: 24; 26 TABLET, FILM COATED ORAL at 09:49

## 2019-01-02 RX ADMIN — BUDESONIDE 500 MCG: 0.5 INHALANT RESPIRATORY (INHALATION) at 08:12

## 2019-01-02 NOTE — PROGRESS NOTES
Chart screened by  for discharge planning. No needs identified at this time. Please consult  if any new issues arise. Care Management Interventions PCP Verified by CM: Yes Mode of Transport at Discharge: Other (see comment) Transition of Care Consult (CM Consult): Discharge Planning Discharge Durable Medical Equipment: No 
Physical Therapy Consult: Yes Occupational Therapy Consult: Yes Current Support Network: Own Home Confirm Follow Up Transport: Family Plan discussed with Pt/Family/Caregiver: Yes Freedom of Choice Offered: Yes Discharge Location Discharge Placement: Home

## 2019-01-02 NOTE — PROGRESS NOTES
Warfarin dosing per pharmacist 
 
Tyree Basil Presley is a 77 y.o. female. Height: 5' 2\" (157.5 cm)    Weight: 89.4 kg (197 lb) Indication: s/p aortic valve replacement Goal INR:  2-3 Home dose:  2.5 mg every day except Fridays when she takes 5 mg Risk factors or significant drug interactions:  aspirin Other anticoagulants:  none Daily Monitoring Date  INR     Warfarin dose HGB              Notes 12/31  1.9  2.5 mg  8.5  
1/1  1.8  2.5 mg  8.9  
1/2  1.7  3 mg  --- Pharmacy has been consulted to dose warfarin this patient with a history of an aortic valve replacement. She presents this admission with a slightly subtherapeutic INR. INR remains subtherapeutic, now 1.7. Will increase warfarin to 3 mg daily. Daily INR. Will continue following. Thank you, Adonay Meraz, PharmD, BCPS Clinical Pharmacist 
[phone]

## 2019-01-02 NOTE — PROGRESS NOTES
Patient Care Setting Care Setting Type: [facility] Patient Care Setting: Home with [facility] to follow [place] Date: 11/20/18 Patient Care Plan: Off Track Why Off Track?: Clinical Pathway is Off 
Updated Care Plan: [address] 12/31/18 with Acute CHF. Anticipate discharge 1/2/19. Health  will follow up with home visit to ensure weighing daily and symptoms to report. Possible visit on 1/4/2019. Per Navigator- CHF teaching completed, verbalize emphasis on monitoring self and report to MD: 
· If you gain 2 lbs in one day or 5 lbs in a week, and short of breath. · If you can not lay flat without developing short of breath or rapid breathing at night; or if it wakes you up. Develop a cough or wheezing. · If you notice swollen hands/feet/ankles or stomach with a bloated/ full feeling. · If you are  more confused or mentally fuzzy or dizzy. · If you notice a rapid or change in your heart rate. · If you become more exhausted all the time and unable to do the same level of activity without stopping to catch your breath. Drink no more than 8 cups a day in 8 oz. cups. Limit Cola Drinks. Your Heart can not handle any more. Stay away from salt (limit anything with salt or sodium in it). Limit to 250mg per serving. Exercise needs to be started with your Doctors approval. 
Reduce stress; Call myself or Provider if assistance is needed. Cardiology follow up on 1/7/2019.

## 2019-01-02 NOTE — DISCHARGE SUMMARY
Discharge Summary Patient ID: 
Radha Schuster [id number] 
54 y.o. 
1950 Admit date: 12/31/2018 12:43 PM 
Discharge date and time: 1/2/2019 Attending: Vincent Clemens DO 
PCP:  Author Rosy MD 
Treatment Team: Attending Provider: Iris Napier; Charge Nurse: Gabriela Barcenas; Utilization Review: Melany Grady RN 
Principal Diagnosis Acute on chronic combined systolic and diastolic CHF (congestive heart failure) ([facility] Principal Problem: 
  Acute on chronic combined systolic and diastolic CHF (congestive heart failure) ([address] (12/31/2018) Active Problems: 
  Iron deficiency anemia due to chronic blood loss (7/29/2009) MDS (myelodysplastic syndrome) ([facility] [address] (12/17/2011) Overview: Procrit started in August, 2011 12/18/11 Procrit weekly and Iron stores. 5-12 12-13-12 good response to 3 weekly procrit did not need it last time 3-7-13 Pt doing well. Just wanted a \"check-up. \" Responding to Procrit  
    every three weeks. 8-29-13 patient has missed some injections on recently restarted  
    hemoglobin only issue , takes oral iron iron stores each time Essential hypertension, benign (1/20/2013) Anticoagulated on Coumadin (7/9/2013) Overview: S/P AVR 
 
  CKD (chronic kidney disease) stage 3, GFR 30-59 ml/min ([facility]) (7/10/2013) COPD (chronic obstructive pulmonary disease) ([facility] [address] (4/2/2014) Overview: HOME OXYGEN 3 LITERS 
 
  HLD (hyperlipidemia) () 
 
  S/P AVR (aortic valve replacement) () Overview: Mechanical/Artific Type 2 diabetes mellitus with nephropathy ([facility] [address] (12/18/2017) Chronic respiratory failure with hypoxia ([facility] [address] (9/7/2018) Debility (9/8/2018) * Admission Diagnoses: Systolic CHF, acute on chronic (HCC) * Discharge Diagnoses:   
Hospital Problems as of 1/2/2019 Date Reviewed: 12/18/2018 Codes Class Noted - Resolved POA Systolic CHF, acute on chronic (HCC) ICD-10-CM: S57.20 ICD-9-CM: 428.23, 428.0  12/31/2018 - Present * (Principal) Acute on chronic combined systolic and diastolic CHF (congestive heart failure) (HCC) ICD-10-CM: I50.43 ICD-9-CM: 428.43, 428.0  12/31/2018 - Present Unknown Debility ICD-10-CM: R53.81 ICD-9-CM: 799.3  9/8/2018 - Present Yes Chronic respiratory failure with hypoxia [facility]) ICD-10-CM: J96.11 
ICD-9-CM: 518.83, 799.02  9/7/2018 - Present Yes Type 2 diabetes mellitus with nephropathy (HCC) (Chronic) ICD-10-CM: E11.21 
ICD-9-CM: 250.40, 583.81  12/18/2017 - Present Yes S/P AVR (aortic valve replacement) (Chronic) ICD-10-CM: Z95.2 ICD-9-CM: V43.3  Unknown - Present Yes Overview Signed 2/23/2016 11:04 AM by Juve Freeman Mechanical/Artific HLD (hyperlipidemia) (Chronic) ICD-10-CM: T83.7 ICD-9-CM: 272.4  Unknown - Present Yes COPD (chronic obstructive pulmonary disease) (HCC) (Chronic) ICD-10-CM: J44.9 ICD-9-CM: 156  4/2/2014 - Present Yes Overview Signed 10/6/2018  8:56 PM by Gayla Chicas NP  
  HOME OXYGEN 3 LITERS 
  
  
   
 CKD (chronic kidney disease) stage 3, GFR 30-59 ml/min (HCC) (Chronic) ICD-10-CM: N18.3 ICD-9-CM: 585.3  7/10/2013 - Present Yes Anticoagulated on Coumadin (Chronic) ICD-10-CM: Z51.81, Z79.01 
ICD-9-CM: V58.83, V58.61  7/9/2013 - Present Yes Overview Signed 7/9/2013  4:16 PM by Huma Tian NP  
  S/P AVR Essential hypertension, benign (Chronic) ICD-10-CM: I10 
ICD-9-CM: 401.1  1/20/2013 - Present Yes  
   
 MDS (myelodysplastic syndrome) (HCC) (Chronic) ICD-10-CM: D46.9 ICD-9-CM: 238.75  12/17/2011 - Present Yes Overview Addendum 8/29/2013 11:06 AM by Cathy Groves started in August, 2011 12/18/11 Procrit weekly and Iron stores. 5-12  
 
 
 
12-13-12 good response to 3 weekly procrit did not need it last time 3-7-13 Pt doing well. Just wanted a \"check-up. \" Responding to Procrit every three weeks. 8-29-13 patient has missed some injections on recently restarted hemoglobin only issue , takes oral iron iron stores each time Iron deficiency anemia due to chronic blood loss (Chronic) ICD-10-CM: D50.0 ICD-9-CM: 280.0  7/29/2009 - Present Yes Hospital Course: Ms. Jermaine Leblanc is a 76 yo female with PMH of CAD s/p cardiac arrest 8211, chronic systolic CHF, CKD 3, COPD, HTN, hyperlipidemia, ICD, MDS, pulmonary HTN, s/p mechanical AVR on coumadin, on trilogy at night, 3 L O2 during the day who presented with c/o increased SOB X2-3 days without LE edema or weight gain. BNP 1151, CXR with mild CHF. Echo Nov 2018 with EF 25% with severe diffuse hyopkinesis. Had recent admission Nov 2018 for CHF exacerbation. She was given IV lasix with -1900 output today. Reports dyspnea improved, she feels back to her baseline. Creatinine stable. Diagnostic Study/Procedure results summary copied from within [facility] EMR: 
 
PORTABLE CHEST, December 31, 2018 at 1339 hours 
  
CLINICAL HISTORY:  Worsening shortness of breath over the last few weeks. History of coronary stenting and aortic valve replacement. 
  
COMPARISON:  November 14, 2018. 
  
FINDINGS:  AP erect image demonstrates no confluent infiltrate or significant 
pleural fluid. The heart is moderately enlarged status post median sternotomy 
and reported valve repair with mild pulmonary venous congestion and perihilar 
edema in a pattern suspicious for mild congestive heart failure in this clinical 
setting. No definite pneumothorax. The bony thorax appears intact on this 
view. There are overlying radiopaque support devices. 
  
IMPRESSION IMPRESSION:  MODERATE CARDIOMEGALY WITH FINDINGS SUSPICIOUS FOR MILD CONGESTIVE HEART FAILURE. Labs: Results:  
   
Chemistry Recent Labs 01/02/19 
6986 01/01/19 
0615 12/31/18 
1256 * 93 206*  143 143  
K 3.8 3.9 3.7 CL 97* 99 100 CO2 37* 40* 36*  
BUN 29* 22 20 CREA 1.45* 1.25* 1.26* CA 9.4 9.6 9.4 AGAP 7 4* 7 TBILI  --   --  0.6 ALT  --   --  18  
AP  --   --  59  
TP  --   --  7.0 ALB  --   --  3.3 GLOB  --   --  3.7* AGRAT  --   --  0.9* CBC w/Diff Recent Labs 01/01/19 
0615 12/31/18 
1256 WBC 6.7 8.8  
RBC 3.21* 2.98* HGB 8.9* 8.5* HCT 30.4* 28.5*  
 194 GRANS  --  82* LYMPH  --  10* EOS  --  1 Cardiac Enzymes No results for input(s): CPK, CKND1, MAY in the last 72 hours. No lab exists for component: Darrel Payne Coagulation Recent Labs 01/02/19 
7325 01/01/19 
6326 PTP 19.3* 20.8* INR 1.7 1.8 Lipid Panel @BRIEFLAB(CHOL,CHOLPOCT,734226,[id number],CHOLX,CHOLP,CHLST,CHOLV,760473,HDL,HDLPOC,HDLPOCT,942588,NHDLCT,[id number],HDLC,HDLP,LDL,LDLPOCT,LDLCPOC,132396,NLDLCT,DLDL,LDLC,DLDLP,756296,VLDLC,VLDL,TGL,TGLX,TRIGL,[id number],TRIGP,TGLPOCT,944496,574558,CHHD,CHHDX)@ BNP No results for input(s): BNPP in the last 72 hours. Liver Enzymes Recent Labs 12/31/18 
1256 TP 7.0 ALB 3.3 AP 59 SGOT 13* Thyroid Studies Lab Results Component Value Date/Time TSH 2.620 11/16/2018 06:22 AM  
    
 
 
Discharge Exam: 
Visit Vitals /81 (BP 1 Location: Left arm, BP Patient Position: At rest) Pulse 73 Temp 98.7 °F (37.1 °C) Resp 18 Ht 5' 2\" (1.575 m) Wt 89.4 kg (197 lb) SpO2 95% BMI 36.03 kg/m² General:       No acute distress   
Lungs:          CTA bilaterally. Resp even and nonlabored Heart:            S1S2 present without murmurs rubs gallops. RRR. Trace LE edema Abdomen:    Soft, non tender, non distended. BS present Extremities:  moves ext spontaneously. No cyanosis. Chronic wound LLE with dressing and compression wrap Neurologic:  A/O X4. No focal deficits Disposition:  Discharge Condition: stable Patient Instructions: Current Discharge Medication List  
  
CONTINUE these medications which have NOT CHANGED Details  
traMADol (ULTRAM) 50 mg tablet Take 1 Tab by mouth every six (6) hours as needed for Pain. Max Daily Amount: 200 mg. Qty: 180 Tab, Refills: 3 Comments: Not to exceed 4 additional fills before 03/07/2018 Associated Diagnoses: Osteoarthritis of shoulder, unspecified laterality, unspecified osteoarthritis type  
  
cholecalciferol, vitamin D3, (VITAMIN D3) 2,000 unit tab Take 2,000 Units by mouth daily. !! warfarin (COUMADIN) 5 mg tablet Take 5 mg by mouth daily. Take as directed  
  
spironolactone (ALDACTONE) 25 mg tablet Take 25 mg by mouth daily. sertraline (ZOLOFT) 50 mg tablet Take 1 Tab by mouth daily. Qty: 30 Tab, Refills: 4 Associated Diagnoses: Anxiety  
  
simvastatin (ZOCOR) 20 mg tablet Take 1 Tab by mouth nightly. Qty: 90 Tab, Refills: 3  
  
sacubitril-valsartan (ENTRESTO) 24 mg/26 mg tablet Take 1 Tab by mouth two (2) times a day. Qty: 180 Tab, Refills: 3  
  
fluticasone-vilanterol (BREO ELLIPTA) 200-25 mcg/dose inhaler Take 1 Puff by inhalation daily. Qty: 1 Inhaler, Refills: 11  
  
OTHER Trilogy  
  
isosorbide mononitrate ER (IMDUR) 30 mg tablet Take 30 mg by mouth daily. aspirin delayed-release 81 mg tablet Take 81 mg by mouth daily. !! warfarin (COUMADIN) 2.5 mg tablet Take 2.5 mg by mouth nightly. Needs 5mg on Friday  
  
docusate sodium (COLACE) 50 mg capsule Take 50 mg by mouth daily. carvedilol (COREG) 6.25 mg tablet Take 1 Tab by mouth two (2) times daily (with meals). Qty: 60 Tab, Refills: 3 Associated Diagnoses: Anemia, unspecified type; Acute kidney injury superimposed on chronic kidney disease ([address] Coronary artery disease involving native coronary artery of native heart without angina pectoris; Chronic combined systolic and diastolic heart failure ([address] Chronic respiratory failure with hypoxia ([address]  Debility; MDS (myelodysplastic syndrome) ([facility] Type 2 diabetes mellitus with nephropathy ([facility] [address] Essential hypertension, benign; Anticoagulated on Coumadin; CKD (chronic kidney disease) stage 3, GFR 30-59 ml/min ([facility] [address] Chronic obstructive pulmonary disease, unspecified COPD type ([facility] [address] REJI (obstructive sleep apnea); Diabetes mellitus type 2, diet-controlled ([facility] ICD (implantable cardioverter-defibrillator) in place; S/P AVR (aortic valve replacement); Obesity, morbid ([facility] Coagulopathy ([facility] Traumatic hematoma of left knee, sequela; Iron deficiency anemia due to chronic blood loss; Osteopenia, unspecified location; Mixed hyperlipidemia; Osteoarthritis of shoulder, unspecified laterality, unspecified osteoarthritis type; Cardiomyopathy, unspecified type ([facility] Pulmonary hypertension ([facility] Dysthymia; Long term (current) use of anticoagulants; Subdural hematoma ([facility] Closed nondisplaced fracture of shaft of fifth metacarpal bone of left hand, sequela; Physical debility; Encounter for immunization  
  
furosemide (LASIX) 40 mg tablet Take 1 Tab by mouth daily. Qty: 30 Tab, Refills: 6 Associated Diagnoses: Essential hypertension, benign  
  
traZODone (DESYREL) 50 mg tablet Take 0.5-1 Tabs by mouth nightly. Qty: 60 Tab, Refills: 2 Associated Diagnoses: REJI (obstructive sleep apnea)  
  
multivit-minerals/folic acid (ADULT ONE DAILY MULTIVITAMIN PO) Take 1 Tab by mouth daily. ascorbic acid, vitamin C, (VITAMIN C) 500 mg tablet Take 500 mg by mouth daily. ferrous gluconate 324 mg (38 mg iron) tablet TAKE 1 TAB BY MOUTH DAILY (BEFORE BREAKFAST). INDICATIONS: IRON DEFICIENCY ANEMIA Qty: 90 Tab, Refills: 3 Associated Diagnoses: Iron deficiency anemia due to chronic blood loss  
  
fluticasone (FLONASE) 50 mcg/actuation nasal spray 2 Sprays by Both Nostrils route daily as needed. Qty: 3 Bottle, Refills: 3  
  
loratadine (CLARITIN) 10 mg tablet TAKE 1 TAB BY MOUTH DAILY. Qty: 30 Tab, Refills: 1 Associated Diagnoses: Upper respiratory tract infection, unspecified type  
  
acetaminophen (TYLENOL) 325 mg tablet Take 650 mg by mouth every four (4) hours as needed for Pain. OXYGEN-AIR DELIVERY SYSTEMS 3 L by Nasal route continuous. nitroglycerin (NITROSTAT) 0.4 mg SL tablet 1 Tab by SubLINGual route every five (5) minutes as needed for Chest Pain. Qty: 1 Bottle, Refills: 5 Associated Diagnoses: Coronary artery disease involving native coronary artery of native heart without angina pectoris  
  
mometasone-formoterol (DULERA) 200-5 mcg/actuation HFA inhaler 2 puffs bid, rinse mouth after use. Qty: 3 Inhaler, Refills: 3 Associated Diagnoses: Pulmonary hypertension ([address] Chronic respiratory failure with hypoxia (HCC)  
  
albuterol (PROVENTIL VENTOLIN) 2.5 mg /3 mL (0.083 %) nebulizer solution 3 mL by Nebulization route every four (4) hours as needed for Wheezing. Qty: 120 Each, Refills: 11  
 Associated Diagnoses: Pulmonary hypertension ([address] Chronic respiratory failure with hypoxia (HCC)  
  
albuterol (VENTOLIN HFA) 90 mcg/actuation inhaler 2 puffs qid prn 
Qty: 1 Inhaler, Refills: 11  
 Associated Diagnoses: Pulmonary hypertension ([address] Chronic respiratory failure with hypoxia (HCC) ! ! - Potential duplicate medications found. Please discuss with provider. Activity: Activity as tolerated Diet: Cardiac Diet Wound Care: As directed Full Code Surrogate decision maker:  Daughter Pneumonia and flu vaccine to be administered at discharge per hospital protocol Follow-up FU PCP 3 days FU Cardiology as scheduled Continue lasix Daily weights Notes, labs, VS, diagnostic testing reviewed Case discussed with pt, care team, Dr. Karina Interiano, daughter Time spent to discharge patient  45 min Signed: 
Ro Boland NP 
1/2/2019 
3:03 PM

## 2019-01-02 NOTE — WOUND CARE
Patient seen this am and noted she may be going home today. Discussed with Neo Rodriguez. Patient given option of changing present LLE wrap today or keep her wound clinic appointment for tomorrow and have it changed there as planned. Patient wound rather keep using the hydraphera blue dressing and keep present wrap until tomorrow. Peeled back edge over knee wound and wound clean and granular at present. If patient changes her mind prior to dc, please call and wound team will see.

## 2019-01-02 NOTE — PROGRESS NOTES
Discharge instructions and prescriptions provided and explained to patient, patient voiced understanding. Medication side effect sheet reviewed with pt. No home meds or valuables to return. Opportunity for questions provided. Pt to be discharged after voiding trial. Family has been notified of discharge orders.

## 2019-01-02 NOTE — DISCHARGE INSTRUCTIONS
DISCHARGE SUMMARY from Nurse    PATIENT INSTRUCTIONS:    After general anesthesia or intravenous sedation, for 24 hours or while taking prescription Narcotics:  · Limit your activities  · Do not drive and operate hazardous machinery  · Do not make important personal or business decisions  · Do  not drink alcoholic beverages  · If you have not urinated within 8 hours after discharge, please contact your surgeon on call. Report the following to your surgeon:  · Excessive pain, swelling, redness or odor of or around the surgical area  · Temperature over 100.5  · Nausea and vomiting lasting longer than 4 hours or if unable to take medications  · Any signs of decreased circulation or nerve impairment to extremity: change in color, persistent  numbness, tingling, coldness or increase pain  · Any questions    What to do at Home:  Recommended activity: Activity as tolerated,     If you experience any of the following symptoms fever, new or unrelieved pain, excessive weight gain, shortness of breath not relieved by rest, swelling or any other worrisome symptoms , please follow up with PCP. *  Please give a list of your current medications to your Primary Care Provider. *  Please update this list whenever your medications are discontinued, doses are      changed, or new medications (including over-the-counter products) are added. *  Please carry medication information at all times in case of emergency situations. These are general instructions for a healthy lifestyle:    No smoking/ No tobacco products/ Avoid exposure to second hand smoke  Surgeon General's Warning:  Quitting smoking now greatly reduces serious risk to your health.     Obesity, smoking, and sedentary lifestyle greatly increases your risk for illness    A healthy diet, regular physical exercise & weight monitoring are important for maintaining a healthy lifestyle    You may be retaining fluid if you have a history of heart failure or if you experience any of the following symptoms:  Weight gain of 3 pounds or more overnight or 5 pounds in a week, increased swelling in our hands or feet or shortness of breath while lying flat in bed. Please call your doctor as soon as you notice any of these symptoms; do not wait until your next office visit. Recognize signs and symptoms of STROKE:    F-face looks uneven    A-arms unable to move or move unevenly    S-speech slurred or non-existent    T-time-call 911 as soon as signs and symptoms begin-DO NOT go       Back to bed or wait to see if you get better-TIME IS BRAIN. Warning Signs of HEART ATTACK     Call 911 if you have these symptoms:   Chest discomfort. Most heart attacks involve discomfort in the center of the chest that lasts more than a few minutes, or that goes away and comes back. It can feel like uncomfortable pressure, squeezing, fullness, or pain.  Discomfort in other areas of the upper body. Symptoms can include pain or discomfort in one or both arms, the back, neck, jaw, or stomach.  Shortness of breath with or without chest discomfort.  Other signs may include breaking out in a cold sweat, nausea, or lightheadedness. Don't wait more than five minutes to call 911 - MINUTES MATTER! Fast action can save your life. Calling 911 is almost always the fastest way to get lifesaving treatment. Emergency Medical Services staff can begin treatment when they arrive -- up to an hour sooner than if someone gets to the hospital by car. The discharge information has been reviewed with the patient. The patient verbalized understanding. Discharge medications reviewed with the patient and appropriate educational materials and side effects teaching were provided.   ___________________________________________________________________________________________________________________________________    Patient Education        Heart Failure: Care Instructions  Your Care Instructions    Heart failure occurs when your heart does not pump as much blood as the body needs. Failure does not mean that the heart has stopped pumping but rather that it is not pumping as well as it should. Over time, this causes fluid buildup in your lungs and other parts of your body. Fluid buildup can cause shortness of breath, fatigue, swollen ankles, and other problems. By taking medicines regularly, reducing sodium (salt) in your diet, checking your weight every day, and making lifestyle changes, you can feel better and live longer. Follow-up care is a key part of your treatment and safety. Be sure to make and go to all appointments, and call your doctor if you are having problems. It's also a good idea to know your test results and keep a list of the medicines you take. How can you care for yourself at home? Medicines    · Be safe with medicines. Take your medicines exactly as prescribed. Call your doctor if you think you are having a problem with your medicine.     · Do not take any vitamins, over-the-counter medicine, or herbal products without talking to your doctor first. Frank Bhardwajbradley not take ibuprofen (Advil or Motrin) and naproxen (Aleve) without talking to your doctor first. They could make your heart failure worse.     · You may be taking some of the following medicine. ? Beta-blockers can slow heart rate, decrease blood pressure, and improve your condition. Taking a beta-blocker may lower your chance of needing to be hospitalized. ? Angiotensin-converting enzyme inhibitors (ACEIs) reduce the heart's workload, lower blood pressure, and reduce swelling. Taking an ACEI may lower your chance of needing to be hospitalized again. ? Angiotensin II receptor blockers (ARBs) work like ACEIs. Your doctor may prescribe them instead of ACEIs. ? Diuretics, also called water pills, reduce swelling. ? Potassium supplements replace this important mineral, which is sometimes lost with diuretics.   ? Aspirin and other blood thinners prevent blood clots, which can cause a stroke or heart attack.    You will get more details on the specific medicines your doctor prescribes. Diet    · Your doctor may suggest that you limit sodium to 2,000 milligrams (mg) a day or less. That is less than 1 teaspoon of salt a day, including all the salt you eat in cooking or in packaged foods. People get most of their sodium from processed foods. Fast food and restaurant meals also tend to be very high in sodium.     · Ask your doctor how much liquid you can drink each day. You may have to limit liquids.    Weight    · Weigh yourself without clothing at the same time each day. Record your weight. Call your doctor if you have a sudden weight gain, such as more than 2 to 3 pounds in a day or 5 pounds in a week. (Your doctor may suggest a different range of weight gain.) A sudden weight gain may mean that your heart failure is getting worse.    Activity level    · Start light exercise (if your doctor says it is okay). Even if you can only do a small amount, exercise will help you get stronger, have more energy, and manage your weight and your stress. Walking is an easy way to get exercise. Start out by walking a little more than you did before. Bit by bit, increase the amount you walk.     · When you exercise, watch for signs that your heart is working too hard. You are pushing yourself too hard if you cannot talk while you are exercising. If you become short of breath or dizzy or have chest pain, stop, sit down, and rest.     · If you feel \"wiped out\" the day after you exercise, walk slower or for a shorter distance until you can work up to a better pace.     · Get enough rest at night. Sleeping with 1 or 2 pillows under your upper body and head may help you breathe easier.    Lifestyle changes    · Do not smoke. Smoking can make a heart condition worse. If you need help quitting, talk to your doctor about stop-smoking programs and medicines.  These can increase your chances of quitting for good. Quitting smoking may be the most important step you can take to protect your heart.     · Limit alcohol to 2 drinks a day for men and 1 drink a day for women. Too much alcohol can cause health problems.     · Avoid getting sick from colds and the flu. Get a pneumococcal vaccine shot. If you have had one before, ask your doctor whether you need another dose. Get a flu shot each year. If you must be around people with colds or the flu, wash your hands often. When should you call for help? Call 911 if you have symptoms of sudden heart failure such as:    · You have severe trouble breathing.     · You cough up pink, foamy mucus.     · You have a new irregular or rapid heartbeat.    Call your doctor now or seek immediate medical care if:    · You have new or increased shortness of breath.     · You are dizzy or lightheaded, or you feel like you may faint.     · You have sudden weight gain, such as more than 2 to 3 pounds in a day or 5 pounds in a week. (Your doctor may suggest a different range of weight gain.)     · You have increased swelling in your legs, ankles, or feet.     · You are suddenly so tired or weak that you cannot do your usual activities.    Watch closely for changes in your health, and be sure to contact your doctor if you develop new symptoms. Where can you learn more? Go to http://macrina-thuy.info/. Enter O705 in the search box to learn more about \"Heart Failure: Care Instructions. \"  Current as of: December 6, 2017  Content Version: 11.8  © 7701-6804 [employer]. Care instructions adapted under license by [employer] (which disclaims liability or warranty for this information). If you have questions about a medical condition or this instruction, always ask your healthcare professional. [place] [address] any warranty or liability for your use of this information.

## 2019-01-02 NOTE — PROGRESS NOTES
Hourly rounding completed. Patient rested during night and used her trilogy machine. All needs met at this time.

## 2019-01-02 NOTE — PROGRESS NOTES
Patient is current with [facility] RN. Resume orders sent to [facility]. Liaisons notified.

## 2019-01-02 NOTE — PROGRESS NOTES
Patients family member brought in home trilogy for patient to wear at night while sleeping. Patient has been placed on home machine with 3 liters bled into the machine. Patient saturation is 96%. Patient is comfortable and stable at this time. No complications.

## 2019-01-02 NOTE — PROGRESS NOTES
1/2/2019 7:30 AM 
 
Admit Date: 12/31/2018 Admit Diagnosis: Systolic CHF, acute on chronic (HCC) Subjective:  
 Patient with A/C s CHF. Seems to be back to baseline with diuresis. Ok to go to po lasix and consider home today Objective:  
  
Visit Vitals /70 (BP 1 Location: Left arm, BP Patient Position: At rest;Lying right side) Pulse 73 Temp 98.2 °F (36.8 °C) Resp 18 Ht 5' 2\" (1.575 m) Wt 89.4 kg (197 lb) SpO2 90% BMI 36.03 kg/m² ROS:  General ROS: negative for - chills Hematological and Lymphatic ROS: negative for - blood clots or jaundice Respiratory ROS: no cough, shortness of breath, or wheezing Cardiovascular ROS: no chest pain or dyspnea on exertion Gastrointestinal ROS: no abdominal pain, change in bowel habits, or black or bloody stools Neurological ROS: no TIA or stroke symptoms Physical Exam: 
 
Physical Examination: General appearance - alert, well appearing, and in no distress Mental status - alert, oriented to person, place, and time Eyes - pupils equal and reactive, extraocular eye movements intact Neck/lymph - supple, no significant adenopathy Chest/CV - clear to auscultation, no wheezes, rales or rhonchi, symmetric air entry Heart - normal rate, regular rhythm, normal S1, S2, no murmurs, rubs, clicks or gallops Abdomen/GI - soft, nontender, nondistended, no masses or organomegaly Musculoskeletal - no joint tenderness, deformity or swelling Extremities - peripheral pulses normal, no pedal edema, no clubbing or cyanosis Skin - normal coloration and turgor, no rashes, no suspicious skin lesions noted Current Facility-Administered Medications Medication Dose Route Frequency  furosemide (LASIX) tablet 40 mg  40 mg Oral ACB&D  
 albuterol (PROVENTIL VENTOLIN) nebulizer solution 2.5 mg  2.5 mg Nebulization Q4H PRN  
 ascorbic acid (vitamin C) (VITAMIN C) tablet 500 mg  500 mg Oral DAILY  aspirin delayed-release tablet 81 mg  81 mg Oral DAILY  carvedilol (COREG) tablet 6.25 mg  6.25 mg Oral BID WITH MEALS  cholecalciferol (VITAMIN D3) tablet 2,000 Units  2,000 Units Oral DAILY  docusate (COLACE) 50 mg/5 mL oral liquid 50 mg  50 mg Oral DAILY  ferrous gluconate 324 mg (38 mg iron) tablet 1 Tab  1 Tab Oral BID WITH MEALS  isosorbide mononitrate ER (IMDUR) tablet 30 mg  30 mg Oral DAILY  loratadine (CLARITIN) tablet 10 mg  10 mg Oral DAILY PRN  
 sacubitril-valsartan (ENTRESTO) 24-26 mg tablet 1 Tab  1 Tab Oral BID  sertraline (ZOLOFT) tablet 50 mg  50 mg Oral DAILY  simvastatin (ZOCOR) tablet 20 mg  20 mg Oral QHS  spironolactone (ALDACTONE) tablet 25 mg  25 mg Oral DAILY  traMADol (ULTRAM) tablet 50 mg  50 mg Oral Q6H PRN  
 traZODone (DESYREL) tablet 50 mg  50 mg Oral QHS PRN  
 warfarin (COUMADIN) tablet 2.5 mg  2.5 mg Oral Once per day on Sun Mon Tue Wed Thu Sat  [START ON 1/4/2019] warfarin (COUMADIN) tablet 5 mg  5 mg Oral Once per day on Fri  
 sodium chloride (NS) flush 5-10 mL  5-10 mL IntraVENous Q8H  
 sodium chloride (NS) flush 5-10 mL  5-10 mL IntraVENous PRN  
 acetaminophen (TYLENOL) tablet 650 mg  650 mg Oral Q4H PRN  
 ondansetron (ZOFRAN) injection 4 mg  4 mg IntraVENous Q4H PRN  
 insulin lispro (HUMALOG) injection 0-10 Units  0-10 Units SubCUTAneous AC&HS  hydrALAZINE (APRESOLINE) 20 mg/mL injection 20 mg  20 mg IntraVENous Q6H PRN  
 budesonide (PULMICORT) 500 mcg/2 ml nebulizer suspension  500 mcg Nebulization BID RT And  
 albuterol (PROVENTIL VENTOLIN) nebulizer solution 2.5 mg  2.5 mg Nebulization Q6HWA RT Data Review:  
@LABRCNT(Na,K,BUN,CREA,WBC,HGB,HCT,PLT,INR,TRP,TCHOL*,Triglyceride*,LDL*,LDLCPOC HDL*,HDL])@ TELEMETRY: nsr Assessment/Plan:  
 
Principal Problem: 
  Acute on chronic combined systolic and diastolic CHF (congestive [facility] failure) [facility] (12/31/2018)The current medical regimen is effective; continue present plan and medications. Active Problems: 
  Iron deficiency anemia due to chronic blood loss (7/29/2009) MDS (myelodysplastic syndrome) ([facility] (12/17/2011) Overview: Procrit started in August, 2011 12/18/11 Procrit weekly and Iron stores. 5-12 12-13-12 good response to 3 weekly procrit did not need it last time 3-7-13 Pt doing well. Just wanted a \"check-up. \" Responding to Procrit  
    every three weeks. 8-29-13 patient has missed some injections on recently restarted  
    hemoglobin only issue , takes oral iron iron stores each time Essential hypertension, benign (1/20/2013)The current medical regimen is effective;  continue present plan and medications. Anticoagulated on Coumadin (7/9/2013) Overview: S/P AVR 
 
  CKD (chronic kidney disease) stage 3, GFR 30-59 ml/min ([facility]) (7/10/2013) COPD (chronic obstructive pulmonary disease) ([facility] (4/2/2014) Overview: HOME OXYGEN 3 LITERS 
 
  HLD (hyperlipidemia) () 
 
  S/P AVR (aortic valve replacement) () Overview: Mechanical/Artific Type 2 diabetes mellitus with nephropathy ([facility] (12/18/2017)The current medical regimen is effective;  continue present plan and medications. Chronic respiratory failure with hypoxia ([address] (9/7/2018) Debility (9/8/2018) Girish Taylor MD

## 2019-01-02 NOTE — PROGRESS NOTES
END OF SHIFT NOTE: 
 
INTAKE/OUTPUT 
12/31 0701 - 01/01 0700 In: -  
Out: 825 [Urine:825] Voiding: NO 
Catheter: YES Color: clear Drain: DIET Cardiac Flatus: Patient does have flatus present. Stool:  0 occurrences. Characteristics: 
  
 
Ambulating Yes Emesis: 0 occurrences. Characteristics: VITAL SIGNS Patient Vitals for the past 12 hrs: 
 Temp Pulse Resp BP SpO2  
01/01/19 1947 98.6 °F (37 °C) 71 18 108/71 95 % 01/01/19 1531 98.7 °F (37.1 °C) 76 18 106/50 97 % 01/01/19 1401     98 % 01/01/19 1200 98.2 °F (36.8 °C) 78 20 121/63 94 % Pain Assessment Pain Intensity 1: 0 (01/01/19 1500) Pain Location 1: Shoulder Pain Intervention(s) 1: Medication (see MAR) Patient Stated Pain Goal: 0 LLE dressing reinforced as pt reports it was changed the day before yesterday by home health? And is due again tomorrow. Wound care was consulted. They should be here tomorrow. It appears the dressing may have slid down slightly? Reinforced and covered. Denied pain.  
 
Stefany Blake RN

## 2019-01-02 NOTE — ROUTINE PROCESS
Had lengthy discussion with patient in regards to CHF and managing at home. Patient stated she had not been weighing daily and had been noticing progressive symptoms and regrets not reporting them sooner to ED visit. We discussed s/s to be aware of an when to notify cardiology for decreasing chances of readmission to hospital. Patient has health  and also has an aide that comes during the week. [facility] have been notified to follow as OP as well. Patient of Braulio DEVINE 7 schedule for January 7th, 2:30. Continue to reinforce if LOS increases CHF teaching started post introduction to pt/family; aware of diagnosis. Planner/scale @ BS and will follow. Smoking/ ETOH/Illicit drug use cessation and maintain a healthy weight covered. Pt/family aware that I can not prescribe nor adjust  medications:  
 
Palliative Care score: 50; entered ACP on file Start 2L/D Fluid restriction/ cardiac diet CHF teaching completed, verbalize emphasis on monitoring self and report to MD: 
? If you gain 2 lbs in one day or 5 lbs in a week, and short of breath. ? If you can not lay flat without developing short of breath or rapid breathing at night; or if it wakes you up. Develop a cough or wheezing. ? If you notice swollen hands/feet/ankles or stomach with a bloated/ full feeling. ? If you are  more confused or mentally fuzzy or dizzy. ? If you notice a rapid or change in your heart rate. ? If you become more exhausted all the time and unable to do the same level of activity without stopping to catch your breath. Drink no more than 8 cups a day in 8 oz. cups. Limit Cola Drinks. Your Heart can not handle any more. Stay away from salt (limit anything with salt or sodium in it). Limit to 250mg per serving. Exercise needs to be started with your Doctors approval. 
Reduce stress; Call myself or Provider if assistance is needed.  
Pass post test via teach back, will make self available post DC ,if an questions arise. Diabetic teaching completed.  Planner/scale @ BS:  60 mins total

## 2019-01-02 NOTE — PROGRESS NOTES
Verbal order received from NP for catheter to be removed and pt needs to void prior to discharge. Primary RN aware. Family coming to review discharge instructions. Will follow.

## 2019-01-03 ENCOUNTER — TELEPHONE (OUTPATIENT)
Dept: HOME HEALTH SERVICES | Facility: HOME HEALTH | Age: 69
End: 2019-01-03

## 2019-01-03 PROCEDURE — 3331090002 HH PPS REVENUE DEBIT

## 2019-01-03 PROCEDURE — 3331090001 HH PPS REVENUE CREDIT

## 2019-01-03 NOTE — TELEPHONE ENCOUNTER
[address] [place] [address] Stephane Simon Pharmacist consult. Mrs. Chetan Negron was discharged yesterday from [facility] with CHF. She is a prior patient from November, so is known to me. I have called and gotten voice mail. I left my name and cell phone number. I asked her to call with any medication questions or issues.

## 2019-01-04 ENCOUNTER — PATIENT OUTREACH (OUTPATIENT)
Dept: RESPIRATORY THERAPY | Age: 69
End: 2019-01-04

## 2019-01-04 ENCOUNTER — HOME CARE VISIT (OUTPATIENT)
Dept: SCHEDULING | Facility: HOME HEALTH | Age: 69
End: 2019-01-04
Payer: MEDICARE

## 2019-01-04 PROCEDURE — 3331090002 HH PPS REVENUE DEBIT

## 2019-01-04 PROCEDURE — G0299 HHS/HOSPICE OF RN EA 15 MIN: HCPCS

## 2019-01-04 PROCEDURE — 3331090001 HH PPS REVENUE CREDIT

## 2019-01-04 NOTE — PROGRESS NOTES
Home Visit #5 Health  arrived at residence to find patient resting alert and oriented in no distress. Patient states that she feels well after diuresis at [facility] this past week. States that she is unsure what changed in her condition to cause CHF exacerbation. Admits that she has not been performing daily weights. States that she can neither see the numbers on the scale nor see to write them down. Discussed with the patient the possibility of a Blue tooth enabled scale that would send the weights to her daughters phone. She will discuss with her daughter. HC will continue to visit this patient weekly. Lung Sounds: Equal and clear bilaterally Weight: 209.6 lbs. Aid can assist patient to weigh several days per week. Edema: The abdomen is soft and non-tender with 1+ pitting edema to bilateral lower extremities. This is improving per the patient. Left leg wound is wrapped twice weekly for non-healing wound. B/P 136/78 LA sitting Follow Up Appointments:  
1/7  1430   Dr Shena Morrell Cardiology Transportation: Family/ Aide Medications: All medications are on hand and managed by family Education: Reviewed early interventions, daily weights, and low sodium diet. DME: Trilogy NIV. Patient admits non-compliance with usage. Wears 02 @ 2 lpm via NC. Safety Concerns:  
 
Patient/Family Concerns: daily weights Tasks: Search for blue tooth scale options Physical Assessment completed and noted on CHF assessment Form. Help line discussed. Will follow up with pt in one week via home visit. End of Visit. David Holliday, Paramedic Health

## 2019-01-05 PROCEDURE — 3331090001 HH PPS REVENUE CREDIT

## 2019-01-05 PROCEDURE — 3331090002 HH PPS REVENUE DEBIT

## 2019-01-06 PROCEDURE — 3331090002 HH PPS REVENUE DEBIT

## 2019-01-06 PROCEDURE — 3331090001 HH PPS REVENUE CREDIT

## 2019-01-07 ENCOUNTER — HOME CARE VISIT (OUTPATIENT)
Dept: SCHEDULING | Facility: HOME HEALTH | Age: 69
End: 2019-01-07
Payer: MEDICARE

## 2019-01-07 VITALS
RESPIRATION RATE: 16 BRPM | BODY MASS INDEX: 38.57 KG/M2 | TEMPERATURE: 98.8 F | HEART RATE: 74 BPM | HEIGHT: 62 IN | WEIGHT: 209.6 LBS | DIASTOLIC BLOOD PRESSURE: 78 MMHG | SYSTOLIC BLOOD PRESSURE: 136 MMHG

## 2019-01-07 VITALS
DIASTOLIC BLOOD PRESSURE: 70 MMHG | OXYGEN SATURATION: 98 % | HEART RATE: 68 BPM | TEMPERATURE: 98 F | RESPIRATION RATE: 18 BRPM | SYSTOLIC BLOOD PRESSURE: 124 MMHG

## 2019-01-07 PROCEDURE — 3331090001 HH PPS REVENUE CREDIT

## 2019-01-07 PROCEDURE — 3331090002 HH PPS REVENUE DEBIT

## 2019-01-07 PROCEDURE — G0299 HHS/HOSPICE OF RN EA 15 MIN: HCPCS

## 2019-01-08 ENCOUNTER — PATIENT OUTREACH (OUTPATIENT)
Dept: RESPIRATORY THERAPY | Age: 69
End: 2019-01-08

## 2019-01-08 ENCOUNTER — TELEPHONE (OUTPATIENT)
Dept: HOME HEALTH SERVICES | Facility: HOME HEALTH | Age: 69
End: 2019-01-08

## 2019-01-08 VITALS
HEART RATE: 70 BPM | SYSTOLIC BLOOD PRESSURE: 126 MMHG | HEIGHT: 62 IN | RESPIRATION RATE: 18 BRPM | TEMPERATURE: 98.6 F | WEIGHT: 217.8 LBS | DIASTOLIC BLOOD PRESSURE: 58 MMHG | BODY MASS INDEX: 40.08 KG/M2

## 2019-01-08 PROCEDURE — 3331090002 HH PPS REVENUE DEBIT

## 2019-01-08 PROCEDURE — 3331090001 HH PPS REVENUE CREDIT

## 2019-01-08 PROCEDURE — A6209 FOAM DRSG <=16 SQ IN W/O BDR: HCPCS

## 2019-01-08 PROCEDURE — A6454 SELF-ADHER BAND W>=3" <5"/YD: HCPCS

## 2019-01-08 NOTE — PROGRESS NOTES
Home Visit # 6 Health  arrived at residence to find patient alert and oriented in no distress and without complaint. Patient saw Dr. Gauri Mcgrath for hospital follow up yesterday. Was given instructions to add an extra Lasix 40 mg for symptoms such as shortness of breath or edema. Assessment revealed the patient has gained 10 lb since last visit 4 days ago. Patient was encouraged to take extra Lasix. Patient is discouraged and angry that her condition is worsening. Patient calmed and reassured. HC explained that this is how the action plan is designed to work. Early recognition of problems and responding to prevent exacerbations. Patient voiced understanding. Patient denies any change to low sodium diet or increases in fluid consumption. [address] Cardiology advised of the the patients weight gain and her response. Patient states that she feels good and has no other symptom other than increased weight. HC contacted patient at 1600 hrs and the patient stated that she continued to feel well and she had been voiding frequently. Wound care nurse to visit patient tomorrow and can assist in weighing the patient. HC will call after this visit to check progress. Lung Sounds:Clear and equal bilaterally. Weight: Scale #1, 219.2  Scale # 2, 217.8 Previous weight on 1/4 209.6 Edema:No notable edema to bilateral lower extremities and the abdomen is soft and non-tender. B/P 126/58  LA Sitting Follow Up Appointments: 
1/10  Device check. 1/10  Wound Care Center. Patient has wound care in home on [address] Transportation: Family or Aide Medications: All medicines are on hand and the patient remains compliant. Education: Reviewed via teach back the CHF action plan to include low sodium diet and fluid restrictions and early interventions. DME: Patient has received a new magnetic mask that has allowed the patient to be more consistent wearing her Trilogy at night.  Patient wears 02 at 3 lpm via NC continuously. Safety Concerns: None Patient/Family Concerns: Weight gain Tasks: Check back with patient tomorrow. Physical Assessment completed and noted on CHF assessment Form. Help line discussed. Will follow up with pt in one week via home visit. End of Visit. Julissa Flores Paramedic Health

## 2019-01-08 NOTE — TELEPHONE ENCOUNTER
Mrs. Diaz said her admission for CHF in December was just a \"hiccup\" due to fluid overload. She saw her PCP yesterday who did not change any medications. She also say Dr. Shahriar Sheppard who told her she could take her furosemide 40mg. twice a day now to keep fluid off. She is feeling much better. Her maintenance meds have not changed and she has been on for about 20 years, so no questions. She said it was OK to call again.

## 2019-01-09 ENCOUNTER — PATIENT OUTREACH (OUTPATIENT)
Dept: RESPIRATORY THERAPY | Age: 69
End: 2019-01-09

## 2019-01-09 ENCOUNTER — HOME CARE VISIT (OUTPATIENT)
Dept: SCHEDULING | Facility: HOME HEALTH | Age: 69
End: 2019-01-09
Payer: MEDICARE

## 2019-01-09 VITALS
SYSTOLIC BLOOD PRESSURE: 124 MMHG | TEMPERATURE: 97.6 F | RESPIRATION RATE: 18 BRPM | DIASTOLIC BLOOD PRESSURE: 68 MMHG | WEIGHT: 220 LBS | BODY MASS INDEX: 40.24 KG/M2 | HEART RATE: 68 BPM | OXYGEN SATURATION: 94 %

## 2019-01-09 LAB
INR BLD: 1.6 (ref 0.9–1.1)
PT POC: 18.7 SECONDS (ref 11.8–14.9)

## 2019-01-09 PROCEDURE — 3331090002 HH PPS REVENUE DEBIT

## 2019-01-09 PROCEDURE — G0299 HHS/HOSPICE OF RN EA 15 MIN: HCPCS

## 2019-01-09 PROCEDURE — 3331090001 HH PPS REVENUE CREDIT

## 2019-01-09 NOTE — PROGRESS NOTES
Contacted patient earlier today to see if she had a weight obtained. The patient advised that the [facility] RN would be there after 1330 hrs. Asked the patient to call and advise when she has a weight. Patient agreed. At 1400 hrs the [facility] RN Juanita Hunter RN contacted the Help-line on behalf of Mrs. Corral and advised that she had obtained a weight of 220 lb at today's visit. This is and increase over yesterdays weight of 219.2 lb. She further advised that there was no dependent edema, shortness of breath and the abdomen remained soft and non-tender. I discussed the difficulty that the patient was having collecting and documenting daily weights. The nurse offered to contact her physcian to order Tele-monitoring for the daily vitals including weights. I contacted Mrs. Corral regarding a plan for the weight gain. She advised that she was taking an additional Furosemide 40 mg tablet and would be limiting her fluids for the remainder of the day. Patient instructed to follow the CHF action plan and contact either CHF Navigator or her Cardiologist office for other problems to include shortness of breath or edema. I will continue to follow this patient with phone calls through out the week.

## 2019-01-10 ENCOUNTER — HOSPITAL ENCOUNTER (OUTPATIENT)
Dept: WOUND CARE | Age: 69
Discharge: HOME | End: 2019-01-10
Attending: SURGERY
Payer: MEDICARE

## 2019-01-10 VITALS
TEMPERATURE: 98.5 F | BODY MASS INDEX: 41.07 KG/M2 | WEIGHT: 223.2 LBS | DIASTOLIC BLOOD PRESSURE: 61 MMHG | RESPIRATION RATE: 20 BRPM | OXYGEN SATURATION: 95 % | HEART RATE: 74 BPM | SYSTOLIC BLOOD PRESSURE: 111 MMHG | HEIGHT: 62 IN

## 2019-01-10 VITALS
HEART RATE: 67 BPM | OXYGEN SATURATION: 97 % | DIASTOLIC BLOOD PRESSURE: 72 MMHG | SYSTOLIC BLOOD PRESSURE: 130 MMHG | RESPIRATION RATE: 17 BRPM | TEMPERATURE: 97.9 F

## 2019-01-10 DIAGNOSIS — L98.9 SKIN DEFECT: ICD-10-CM

## 2019-01-10 DIAGNOSIS — Z79.01 ANTICOAGULATED ON COUMADIN: Chronic | ICD-10-CM

## 2019-01-10 DIAGNOSIS — R53.81 DEBILITY: ICD-10-CM

## 2019-01-10 DIAGNOSIS — E11.21 TYPE 2 DIABETES MELLITUS WITH NEPHROPATHY (HCC): Chronic | ICD-10-CM

## 2019-01-10 PROCEDURE — 29581 APPL MULTLAYER CMPRN SYS LEG: CPT

## 2019-01-10 PROCEDURE — 3331090002 HH PPS REVENUE DEBIT

## 2019-01-10 PROCEDURE — 74011000250 HC RX REV CODE- 250: Performed by: SURGERY

## 2019-01-10 PROCEDURE — 99214 OFFICE O/P EST MOD 30 MIN: CPT | Performed by: SURGERY

## 2019-01-10 PROCEDURE — 3331090001 HH PPS REVENUE CREDIT

## 2019-01-10 RX ORDER — SILVER NITRATE 38.21; 12.74 MG/1; MG/1
1 STICK TOPICAL ONCE
Status: COMPLETED | OUTPATIENT
Start: 2019-01-10 | End: 2019-01-10

## 2019-01-10 RX ADMIN — SILVER NITRATE APPLICATORS 1 APPLICATOR: 25; 75 STICK TOPICAL at 14:30

## 2019-01-10 NOTE — PROGRESS NOTES
WOUND CENTER Consult Note/Progress Note:  
Crissy Alexandre  MRN: [id number]  TFJ:[birth year]  Age:68 y.o. 
 
HPI: Crissy Alexandre is a 77 y.o. female who is a 77 y.o. female \"with extensive medical history as noted below on chronic coumadin who initially presented to ER 9/7 after fall in bathroom 8/28 where she landed directly on her left knee with complaints of dizziness and fatigue and found to have Hgb of 6.7.  She was transfused to Hgb of 9.6.  She also was found with a large hematoma/bullae over her left knee and INR of 4.3.  She underwent an I+D per Dr Tahira Tello on 9/11 with a large amount of serosanguinous fluid evacuated. Sakshi Garza has been followed by home health PT, OT and wound care since discharge from rehab on ninth floor 9/11-9/21. Sakshi Garza has done well except the wound has not closed.  Two days ago, Merged with [facility] RN and daughter who is an RN noted mild erythema and heat surrounding upper half of wound.  Open area approx 5-6 cm in circumference.  No purulent drainage, oozing small amounts of dark blood with slightly foul odor.  Full ROM to knee.  All distal circulation, motion and sensation WNL for patient.  No distal edema, mild local edema.  Multiple old scars from prior lower extremity wounds.  Patient denies pain to area, fever, systemic symptoms.  She does not appear septic and lactic acid is 1.8.  WBC is 11.6 without shift.  Wound cultured in ER prior to administration of antibiotics.  Begun on vancomycin and ceftriaxone until cultures resulted. Jose Manuel Andre, attentive family at bedside. Creatinine 1.48 on admission with baseline of 1.2.  Of note, patient reports diarrhea x 4 days without use of antibiotics, additional GI symptoms. \"   
  
  
10/7/18: Pt sitting up on side of the bed just finishing breakfast. No complaints. AF, NAD. WBC 8.8, Pt taking Coumadin 2.5mg qhs. INR 3.0. 
10/8/18: POD1 s/p left knee debridement; awake in bed, no complaints. AF, VSS. Cx growing P. Mirabilis. 10/9/18: POD2 s/p left knee debridement; awake in chair, no complaints. AF, VSS. Cx growing P. Mirabilis. Vac placed today and functioning without leak 10/10/18: POD3 s/p left knee debridement; awake in chair, no complaints. AF, VSS. Cx growing P. Mirabilis. Vac functioning without leak or bleeding This information was obtained from the patient The following HPI elements were documented for the patient's wound: 
Location: L below knee anterior Duration: 8/28/2018 Severity:  No pain Context:  Fall in bathroom on coumadin, secondary infection Modifying Factors:  Nothing makes better or worse. Improved with debridement on 10/7/2018 and VAC Quality:  n/a Timing:  n/a Associated Signs and Symptoms: 
 
 
 
Past Medical History:  
Diagnosis Date  Anticoagulated on Coumadin 7/9/2013 S/P AVR  CAD (coronary artery disease) 1/20/2013 5/8/14 PCI LAD with stent placed  Cardiomyopathy ([address]  CHF (congestive heart failure) ([address] 2/17/2017  CKD (chronic kidney disease) stage 3, GFR 30-59 ml/min ([facility]) 7/10/2013  COPD (chronic obstructive pulmonary disease) ([address] 4/2/2014  Diabetes ([address]  Essential hypertension, benign 1/20/2013  HLD (hyperlipidemia)  ICD (implantable cardioverter-defibrillator) in place 10/2/2014 Biotronik single-chamber ICD implantation 10/20/14  Iron deficiency anemia due to chronic blood loss 7/29/2009  MDS (myelodysplastic syndrome) ([address] 12/17/2011 Procrit started in August, 2011 12/18/11 Procrit weekly and Iron stores. 5-12 12-13-12 good response to 3 weekly procrit did not need it last time  3-7-13 Pt doing well. Just wanted a \"check-up. \" Responding to Procrit every three weeks. 8-29-13 patient has missed some injections on recently restarted hemoglobin only issue , takes oral iron iron stores each time  REJI (obstructive sleep apnea)-cpap 4/2/2014  Osteoarthritis  Osteopenia  Pulmonary hypertension ([address] 6/15/2016  Quadrantanopia  Skin defect 2018  Visual complaint 2015 Past Surgical History:  
Procedure Laterality Date [address]    HX AORTIC VALVE REPLACEMENT  ,   
 mechanical valve   HX  SECTION    
 HX CORONARY STENT PLACEMENT  May, 2014 STEMI with Stent placement.  HX ENDOSCOPY  2009 EGD [address] CATHETERIZATION    HX PACEMAKER  10/2/2014 Biotronik ICD  HX TUBAL LIGATION    
 IR BX BONE MARROW DIAGNOSTIC  2011 Current Outpatient Medications Medication Sig  
 traMADol (ULTRAM) 50 mg tablet Take 1 Tab by mouth every six (6) hours as needed for Pain. Max Daily Amount: 200 mg.  cholecalciferol, vitamin D3, (VITAMIN D3) 2,000 unit tab Take 2,000 Units by mouth daily.  warfarin (COUMADIN) 5 mg tablet Take 5 mg by mouth daily. Take as directed  spironolactone (ALDACTONE) 25 mg tablet Take 25 mg by mouth daily.  nitroglycerin (NITROSTAT) 0.4 mg SL tablet 1 Tab by SubLINGual route every five (5) minutes as needed for Chest Pain.  sertraline (ZOLOFT) 50 mg tablet Take 1 Tab by mouth daily.  simvastatin (ZOCOR) 20 mg tablet Take 1 Tab by mouth nightly.  sacubitril-valsartan (ENTRESTO) 24 mg/26 mg tablet Take 1 Tab by mouth two (2) times a day.  fluticasone-vilanterol (BREO ELLIPTA) 200-25 mcg/dose inhaler Take 1 Puff by inhalation daily.  OTHER Trilogy  mometasone-formoterol (DULERA) 200-5 mcg/actuation HFA inhaler 2 puffs bid, rinse mouth after use. (Patient taking differently: Take 2 Puffs by inhalation two (2) times daily as needed.)  isosorbide mononitrate ER (IMDUR) 30 mg tablet Take 30 mg by mouth daily.  aspirin delayed-release 81 mg tablet Take 81 mg by mouth daily.  warfarin (COUMADIN) 2.5 mg tablet Take 2.5 mg by mouth nightly. Needs 5mg on Friday  docusate sodium (COLACE) 50 mg capsule Take 50 mg by mouth daily.  carvedilol (COREG) 6.25 mg tablet Take 1 Tab by mouth two (2) times daily (with meals). (Patient taking differently: Take 3.125 mg by mouth two (2) times daily (with meals). )  furosemide (LASIX) 40 mg tablet Take 1 Tab by mouth daily.  traZODone (DESYREL) 50 mg tablet Take 0.5-1 Tabs by mouth nightly. (Patient taking differently: Take 25-50 mg by mouth as needed.)  multivit-minerals/folic acid (ADULT ONE DAILY MULTIVITAMIN PO) Take 1 Tab by mouth daily.  ascorbic acid, vitamin C, (VITAMIN C) 500 mg tablet Take 500 mg by mouth daily.  ferrous gluconate 324 mg (38 mg iron) tablet TAKE 1 TAB BY MOUTH DAILY (BEFORE BREAKFAST). INDICATIONS: IRON DEFICIENCY ANEMIA (Patient taking differently: Take 325 mg by mouth two (2) times daily (with meals). )  fluticasone (FLONASE) 50 mcg/actuation nasal spray 2 Sprays by Both Nostrils route daily as needed. (Patient taking differently: 2 Sprays by Both Nostrils route daily as needed for Allergies.)  loratadine (CLARITIN) 10 mg tablet TAKE 1 TAB BY MOUTH DAILY. (Patient taking differently: TAKE 1 TAB BY MOUTH DAILY PRN)  albuterol (PROVENTIL VENTOLIN) 2.5 mg /3 mL (0.083 %) nebulizer solution 3 mL by Nebulization route every four (4) hours as needed for Wheezing.  albuterol (VENTOLIN HFA) 90 mcg/actuation inhaler 2 puffs qid prn (Patient taking differently: Take 2 Puffs by inhalation every six (6) hours as needed for Wheezing or Shortness of Breath.)  acetaminophen (TYLENOL) 325 mg tablet Take 650 mg by mouth every four (4) hours as needed for Pain.  OXYGEN-AIR DELIVERY SYSTEMS 3 L by Nasal route continuous. No current facility-administered medications for this encounter. ALLERGIES: Sulfa (sulfonamide antibiotics) and Aspirin Social History Socioeconomic History  Marital status:  Spouse name: Not on file  Number of children: Not on file  Years of education: Not on file  Highest education level: Not on file Occupational History Employer: RETIRED Comment: [employer] Tobacco Use  Smoking status: Former Smoker Packs/day: 0.25 Years: 42.00 Pack years: 10.50 Types: Cigarettes Start date: 10/1/1956 Last attempt to quit: 2014 Years since quittin.6  Smokeless tobacco: Never Used Substance and Sexual Activity  Alcohol use: No  
  Alcohol/week: 0.0 oz  Drug use: No  
Other Topics Concern  Weight Concern Yes  Special Diet Yes Social History Narrative Lives with her daughter. Emmy Velasquez and Carmen Jose are caregivers. Ambulates only short distances. Social History Tobacco Use Smoking Status Former Smoker  Packs/day: 0.25  
 Years: 42.00  
 Pack years: 10.50  Types: Cigarettes  Start date: 10/1/1956  Last attempt to quit: 2014  Years since quittin.6 Smokeless Tobacco Never Used Family History Problem Relation Age of Onset  Heart Disease Mother CHF  Hypertension Mother  Kidney Disease Mother  Heart Disease Father CHF  Lung Disease Father  Diabetes Father  Cancer Father   
     prostate  Hypertension Father  Heart Attack Father  Heart Disease Maternal Aunt  Diabetes Brother  Coronary Artery Disease Other  Breast Cancer Neg Hx   
 
 
ROS: The patient has no difficulty with chest pain or shortness of breath. No fever or chills. Comprehensive review of systems was otherwise unremarkable except as noted above. Physical Exam:  
Visit Vitals /61 (BP 1 Location: Left arm) Pulse 74 Temp 98.5 °F (36.9 °C) Resp 20 Ht 5' 2\" (1.575 m) Wt 223 lb 3.2 oz (101.2 kg) SpO2 95% BMI 40.82 kg/m² Constitutional: Alert, oriented, cooperative patient in no acute distress; appears stated age;  General appearance is within normal limits for wound care patient population. See the today's recorded vitals signs and constitutional data. Eyes: Pupils equal; Sclera are clear. EOMs intact; The eyes appear to track and move normally. The sclera are not injected. The conjunctive are clear. The eyelids are normal. There is no scleral icterus. ENMT: There are no obvious external ear, nose, lip or mouth lesions. Nares normal; Neck: Overall contour of the neck is normal with no obvious neck masses. Gross hearing is within normal limits. No obvious neck masses CV: RRR;  no JVD; No evidence of cyanosis of the upper extremities. The extremities are perfused without embolic sign, splinter hemorrhages, or petechia. Resp:  Breathing is non-labored; normal rate and effort; no audible wheezing. GI: soft and non-distended without acute abnormality noted. Musculoskeletal: unremarkable with normal function. No embolic signs or cyanosis. Neuro:  Oriented; moves all 4; no focal deficits Psychiatric: Judgement and insight are within normal limits for the wound care population of patients. Patient is oriented to person, place, and time. Recent and remote memory are within normal limits. Mood and affect are within normal limits. Integumentary (Skin/Wounds) See inspection of wound(s) below. There are no other skin areas of palpable concern. See wound center documentation including photos in the [address] Wound Template made part of this record by reference. Wounds: 
Wound Knee Anterior; Left (Active) DRESSING STATUS Clean, dry, and intact 1/10/2019  1:55 PM  
DRESSING TYPE Negative pressure wound therapy 12/6/2018  3:57 PM  
Non-Pressure Injury Full thickness (subcut/muscle) 12/13/2018  4:45 PM  
Wound Length (cm) 3.4 cm 1/10/2019  1:55 PM  
Wound Width (cm) 6 cm 1/10/2019  1:55 PM  
Wound Depth (cm) 0.1 1/10/2019  1:55 PM  
Wound Surface area (cm^2) 20.4 cm^2 1/10/2019  1:55 PM  
Change in Wound Size % 63.74 1/10/2019  1:55 PM  
Condition of Base Granulation 1/10/2019  1:55 PM  
Condition of Edges Open 11/1/2018  1:43 PM  
 Tissue Type Red 11/1/2018  1:43 PM  
Tissue Type Percent Red 100 1/10/2019  1:55 PM  
Drainage Amount  Moderate 1/10/2019  1:55 PM  
Drainage Color Sanguinous 1/10/2019  1:55 PM  
Wound Odor None 1/10/2019  1:55 PM  
Periwound Skin Condition Intact 1/10/2019  1:55 PM  
Cleansing and Cleansing Agents  Normal saline 1/10/2019  1:55 PM  
Procedure Tolerated Well 12/13/2018  4:45 PM  
Number of days: 84  
   
[REMOVED] Wound Knee Left (Removed) Number of days: 29  
   
[REMOVED] Wound (Removed) Number of days: 6 [REMOVED] Wound Leg Lower Anterior; Inferior;Left;Lower (Removed) Number of days:   
  
 
 
 
 
 
Recent vitals (if inpt): 
Patient Vitals for the past 24 hrs: 
 BP Temp Pulse Resp SpO2 Height Weight 01/10/19 1347 111/61 98.5 °F (36.9 °C) 74 20 95 % 5' 2\" (1.575 m) 223 lb 3.2 oz (101.2 kg) Labs: 
Recent Labs 01/09/19 
1319 INR 1.6* Lab Results Component Value Date/Time WBC 6.7 01/01/2019 06:15 AM  
 HGB 8.9 (L) 01/01/2019 06:15 AM  
 PLATELET 835 67/98/3825 06:15 AM  
 Sodium 141 01/02/2019 06:23 AM  
 Potassium 3.8 01/02/2019 06:23 AM  
 Chloride 97 (L) 01/02/2019 06:23 AM  
 CO2 37 (H) 01/02/2019 06:23 AM  
 BUN 29 (H) 01/02/2019 06:23 AM  
 Creatinine 1.45 (H) 01/02/2019 06:23 AM  
 Glucose 102 (H) 01/02/2019 06:23 AM  
 INR 1.7 01/02/2019 06:23 AM  
 INR, External 1.6 01/09/2019 INR POC 1.6 (A) 01/09/2019 01:19 PM  
 aPTT 60.2 (H) 10/10/2018 04:02 AM  
 Bilirubin, total 0.6 12/31/2018 12:56 PM  
 AST (SGOT) 13 (L) 12/31/2018 12:56 PM  
 ALT (SGPT) 18 12/31/2018 12:56 PM  
 Alk. phosphatase 59 12/31/2018 12:56 PM  
 Amylase 88 01/31/2012 03:12 PM  
 Lipase 157 10/08/2018 04:26 AM  
 Lactic acid 0.7 02/16/2016 06:16 AM  
 Troponin-I <0.05 01/31/2012 03:32 PM  
 Troponin-I, Qt. 0.05 12/31/2018 12:56 PM  
 
 
I reviewed recent labs and recent radiologic studies. I independently reviewed radiology images for studies I described above or studies I have ordered. Admission date (for inpatients): 1/10/2019 * No surgery found *  * No surgery found * ASSESSMENT/PLAN: 
Significant improvement since discharge from the hospital status post debridement on 10/7/2018 and VAC placement. She had PuraplyAM skin substitute graft on 11/1/2018. The VAC was maintained for 1 week without change. The wound is large and full-thickness. She was scheduled for 1 week follow-up after evaluation on 11/8. She became hospitalized and missed her follow-up appointments. She was hospitalized with respiratory issues and is on home oxygen as well as her chronic anticoagulation. She has developed some bloody drainage from the [facility]. The wound has improved dramatically with 100% granulation base. PuraplyAM #2 was placed on 12/6 and well tolerated. We discontinued the VAC since it had filled with blood. However, on 12/20 her wound had a very bad odor. The graft was somewhat soupy. The graft was intact. There appears to be an over abundance of granulation. We did not place another graft, but dressed with Hydrofera Blue ready. The wound did calm down but did not require any silver nitrate. However the dressing has come down from her knee. We will need to immobilize her knee better and wrap at higher to maintain a good dressing. This will be critical to perform any grafting procedure without stabilization with a VAC. She missed last week's appointment due to being hospitalized for CHF exacerbation. She has not obtained a knee immobilizer. We will try to arrange for that now. There is still difficulty with the dressings falling down. I will not doing grafting procedures until we have stabilization of the wound and dressings. When the saturation is acceptable we will moved to autograft. I will see her back next week for reevaluation. Problem List  Date Reviewed: 1/7/2019 Codes Class Noted  Systolic CHF, acute on chronic (HCC) ICD-10-CM: Q88.47 
 ICD-9-CM: 428.23, 428.0  12/31/2018 Acute on chronic combined systolic and diastolic CHF (congestive heart failure) (HCC) ICD-10-CM: I50.43 ICD-9-CM: 428.43, 428.0  12/31/2018 Acute exacerbation of CHF (congestive heart failure) (HCC) ICD-10-CM: I50.9 ICD-9-CM: 428.0  11/14/2018 Debility ICD-10-CM: R53.81 ICD-9-CM: 799.3  9/8/2018 Anemia (Chronic) ICD-10-CM: D64.9 ICD-9-CM: 285.9  9/7/2018 Chronic respiratory failure with hypoxia [facility]) ICD-10-CM: J96.11 
ICD-9-CM: 518.83, 799.02  9/7/2018 Encounter for immunization ICD-10-CM: W06 ICD-9-CM: V03.89  8/21/2018 Physical debility (Chronic) ICD-10-CM: R53.81 ICD-9-CM: 799.3  5/2/2018 Closed nondisplaced fracture of shaft of fifth metacarpal bone of left hand ICD-10-CM: P95.569L ICD-9-CM: 815.03  4/28/2018 Type 2 diabetes mellitus with nephropathy (HCC) (Chronic) ICD-10-CM: E11.21 
ICD-9-CM: 250.40, 583.81  12/18/2017 S/P AVR (aortic valve replacement) (Chronic) ICD-10-CM: Z95.2 ICD-9-CM: V43.3  Unknown Overview Signed 2/23/2016 11:04 AM by Jr Bowser Mechanical/Artific Cardiomyopathy ([facility] (Chronic) ICD-10-CM: I42.9 ICD-9-CM: 425.4  Unknown Osteopenia ICD-10-CM: M85.80 ICD-9-CM: 733.90  Unknown HLD (hyperlipidemia) (Chronic) ICD-10-CM: K33.3 ICD-9-CM: 272.4  Unknown Osteoarthritis ICD-10-CM: M19.90 ICD-9-CM: 715.90  Unknown  
   
 ICD (implantable cardioverter-defibrillator) in place ICD-10-CM: Z95.810 ICD-9-CM: V45.02  10/2/2014 Overview Signed 10/2/2014  4:58 PM by Nirmal Carnes Biotroniblaise single-chamber ICD implantation 10/20/14 COPD (chronic obstructive pulmonary disease) (HCC) (Chronic) ICD-10-CM: J44.9 ICD-9-CM: 056  4/2/2014  Overview Signed 10/6/2018  8:56 PM by Jesi Chowdary NP  
  HOME OXYGEN 3 LITERS 
  
  
   
 REJI (obstructive sleep apnea)-cpap (Chronic) ICD-10-CM: W49.42 
 ICD-9-CM: 327.23  4/2/2014 CKD (chronic kidney disease) stage 3, GFR 30-59 ml/min (HCC) (Chronic) ICD-10-CM: N18.3 ICD-9-CM: 585.3  7/10/2013 Anticoagulated on Coumadin (Chronic) ICD-10-CM: Z51.81, Z79.01 
ICD-9-CM: V58.83, V58.61  7/9/2013 Overview Signed 7/9/2013  4:16 PM by Manny Carter NP  
  S/P AVR 
  
  
   
 CAD (coronary artery disease) (Chronic) ICD-10-CM: I25.10 ICD-9-CM: 414.00  1/20/2013 Overview Signed 5/20/2014 10:05 AM by Zafar Lorenzo NP  
  5/8/14 PCI LAD with stent placed Chronic combined systolic and diastolic heart failure (HCC) (Chronic) ICD-10-CM: I50.42 
ICD-9-CM: 428.42  1/20/2013 Overview Addendum 4/28/2018  4:53 AM by Orlando Sandhoff, MD  
  5/8/14 ECHO:  EF 10-15% 12/2017:  EF 25-30% Essential hypertension, benign (Chronic) ICD-10-CM: I10 
ICD-9-CM: 401.1  1/20/2013 MDS (myelodysplastic syndrome) (HCC) (Chronic) ICD-10-CM: D46.9 ICD-9-CM: 238.75  12/17/2011 Overview Addendum 8/29/2013 11:06 AM by Wil Mahoney Procrit started in August, 2011 12/18/11 Procrit weekly and Iron stores. 5-12 12-13-12 good response to 3 weekly procrit did not need it last time 3-7-13 Pt doing well. Just wanted a \"check-up. \" Responding to Procrit every three weeks. 8-29-13 patient has missed some injections on recently restarted hemoglobin only issue , takes oral iron iron stores each time Iron deficiency anemia due to chronic blood loss (Chronic) ICD-10-CM: D50.0 ICD-9-CM: 280.0  7/29/2009 Active Problems: * No active hospital problems. * Any procedures done today in the wound center are documented in a separate note in [facility] care made part of this record by reference Wound Care No orders of the defined types were placed in this encounter. Follow-up Information Follow up With Specialties Details Why Contact Info [facility] Wound Care In 1 week  Marcial Walker [phone] Signed:  Facundo Real MD,  FACS

## 2019-01-10 NOTE — WOUND CARE
[address] [place] [address] [address] Phone: [phone] Fax: [phone] Patient: Chris Pryor MRN: [id number]  SSN: [id number] YOB: 1950  Age: 77 y.o. Sex: female Return Appointment: 1 week with Talbot Litten, MD 
 
Instructions: Left anterior knee wound: 
Cleanse wound with normal saline. Apply Hydrofera Ready, ABD, and wrap with Coban 2. Home health to change dressing 3 times per week. For wound center: 
Order knee immobilizer- size medium short. Needed for next visit (1/17/19) if possible. Plan for cellutome next week if knee immobilizer here. Should you experience increased redness, swelling, pain, foul odor, size of wound(s), or have a temperature over 101 degrees please contact the [address] [place] [address] Road at [phone] or if after hours contact your primary care physician or go to the hospital emergency department. Signed By: Deana Rodriguez RN   
 January 10, 2019

## 2019-01-10 NOTE — WOUND CARE
01/10/19 1355 Wound Knee Anterior; Left Date First Assessed/Time First Assessed: 10/18/18 1505   POA: Yes  Wound Type: Trauma  Location: Knee  Wound Description (Optional): #1  Orientation: Anterior; Left DRESSING STATUS Clean, dry, and intact DRESSING TYPE (hydrofera ready, abd, coban 2) Wound Length (cm) 3.4 cm Wound Width (cm) 6 cm Wound Depth (cm) 0.1 Wound Surface area (cm^2) 20.4 cm^2 Change in Wound Size % 63.74 Condition of Base Granulation Tissue Type Percent Red 100 Drainage Amount  Moderate Drainage Color Sanguinous Wound Odor None Periwound Skin Condition Intact Cleansing and Cleansing Agents  Normal saline Patient is currently taking coumadin.

## 2019-01-11 ENCOUNTER — PATIENT OUTREACH (OUTPATIENT)
Dept: RESPIRATORY THERAPY | Age: 69
End: 2019-01-11

## 2019-01-11 ENCOUNTER — HOME CARE VISIT (OUTPATIENT)
Dept: SCHEDULING | Facility: HOME HEALTH | Age: 69
End: 2019-01-11
Payer: MEDICARE

## 2019-01-11 PROCEDURE — 3331090002 HH PPS REVENUE DEBIT

## 2019-01-11 PROCEDURE — 3331090001 HH PPS REVENUE CREDIT

## 2019-01-11 PROCEDURE — G0299 HHS/HOSPICE OF RN EA 15 MIN: HCPCS

## 2019-01-12 PROCEDURE — 3331090001 HH PPS REVENUE CREDIT

## 2019-01-12 PROCEDURE — 3331090002 HH PPS REVENUE DEBIT

## 2019-01-13 PROCEDURE — 3331090002 HH PPS REVENUE DEBIT

## 2019-01-13 PROCEDURE — 3331090001 HH PPS REVENUE CREDIT

## 2019-01-14 ENCOUNTER — HOME CARE VISIT (OUTPATIENT)
Dept: SCHEDULING | Facility: HOME HEALTH | Age: 69
End: 2019-01-14
Payer: MEDICARE

## 2019-01-14 VITALS
TEMPERATURE: 98.7 F | SYSTOLIC BLOOD PRESSURE: 123 MMHG | DIASTOLIC BLOOD PRESSURE: 72 MMHG | HEART RATE: 68 BPM | RESPIRATION RATE: 17 BRPM | OXYGEN SATURATION: 98 %

## 2019-01-14 PROCEDURE — 3331090002 HH PPS REVENUE DEBIT

## 2019-01-14 PROCEDURE — 3331090001 HH PPS REVENUE CREDIT

## 2019-01-14 PROCEDURE — G0299 HHS/HOSPICE OF RN EA 15 MIN: HCPCS

## 2019-01-14 NOTE — PROGRESS NOTES
Contacted the patient in regards to her CHF. Patient states that her weights have remained steady for the last two days, that there is no worsening of edema or shortness of breath and that she is taking an extra Lasix 40 mg in the evening,adn monitoring fluids and sodium intake. Will contact Cardiology if she develops shortness of breath or edema. HC will contact patient on Monday.

## 2019-01-15 ENCOUNTER — HOSPITAL ENCOUNTER (OUTPATIENT)
Dept: LAB | Age: 69
Discharge: HOME | End: 2019-01-15
Payer: MEDICARE

## 2019-01-15 ENCOUNTER — HOSPITAL ENCOUNTER (OUTPATIENT)
Dept: INFUSION THERAPY | Age: 69
Discharge: HOME | End: 2019-01-15
Payer: MEDICARE

## 2019-01-15 ENCOUNTER — PATIENT OUTREACH (OUTPATIENT)
Dept: RESPIRATORY THERAPY | Age: 69
End: 2019-01-15

## 2019-01-15 ENCOUNTER — TELEPHONE (OUTPATIENT)
Dept: HOME HEALTH SERVICES | Facility: HOME HEALTH | Age: 69
End: 2019-01-15

## 2019-01-15 DIAGNOSIS — D46.9 MDS (MYELODYSPLASTIC SYNDROME) (HCC): Chronic | ICD-10-CM

## 2019-01-15 DIAGNOSIS — D50.0 IRON DEFICIENCY ANEMIA DUE TO CHRONIC BLOOD LOSS: Chronic | ICD-10-CM

## 2019-01-15 DIAGNOSIS — D46.9 MDS (MYELODYSPLASTIC SYNDROME) (HCC): Primary | ICD-10-CM

## 2019-01-15 DIAGNOSIS — D50.0 IRON DEFICIENCY ANEMIA DUE TO CHRONIC BLOOD LOSS: ICD-10-CM

## 2019-01-15 PROBLEM — E66.01 SEVERE OBESITY (HCC): Status: ACTIVE | Noted: 2019-01-15

## 2019-01-15 LAB
ALBUMIN SERPL-MCNC: 3.4 G/DL (ref 3.2–4.6)
ALBUMIN/GLOB SERPL: 0.9 {RATIO} (ref 1.2–3.5)
ALP SERPL-CCNC: 61 U/L (ref 50–136)
ALT SERPL-CCNC: 17 U/L (ref 12–65)
ANION GAP SERPL CALC-SCNC: 2 MMOL/L (ref 7–16)
AST SERPL-CCNC: 8 U/L (ref 15–37)
BASOPHILS # BLD: 0 K/UL (ref 0–0.2)
BASOPHILS NFR BLD: 0 % (ref 0–2)
BILIRUB SERPL-MCNC: 0.5 MG/DL (ref 0.2–1.1)
BUN SERPL-MCNC: 56 MG/DL (ref 8–23)
CALCIUM SERPL-MCNC: 9.1 MG/DL (ref 8.3–10.4)
CHLORIDE SERPL-SCNC: 97 MMOL/L (ref 98–107)
CO2 SERPL-SCNC: 40 MMOL/L (ref 21–32)
CREAT SERPL-MCNC: 2.05 MG/DL (ref 0.6–1)
DIFFERENTIAL METHOD BLD: ABNORMAL
EOSINOPHIL # BLD: 0.2 K/UL (ref 0–0.8)
EOSINOPHIL NFR BLD: 3 % (ref 0.5–7.8)
ERYTHROCYTE [DISTWIDTH] IN BLOOD BY AUTOMATED COUNT: 15.5 % (ref 11.9–14.6)
FERRITIN SERPL-MCNC: 35 NG/ML (ref 8–388)
GLOBULIN SER CALC-MCNC: 3.7 G/DL (ref 2.3–3.5)
GLUCOSE SERPL-MCNC: 184 MG/DL (ref 65–100)
HCT VFR BLD AUTO: 27.9 % (ref 35.8–46.3)
HGB BLD-MCNC: 8.3 G/DL (ref 11.7–15.4)
IMM GRANULOCYTES # BLD AUTO: 0 K/UL (ref 0–0.5)
IMM GRANULOCYTES NFR BLD AUTO: 0 % (ref 0–5)
IRON SATN MFR SERPL: 18 %
IRON SERPL-MCNC: 49 UG/DL (ref 35–150)
LYMPHOCYTES # BLD: 1.2 K/UL (ref 0.5–4.6)
LYMPHOCYTES NFR BLD: 21 % (ref 13–44)
MCH RBC QN AUTO: 27.9 PG (ref 26.1–32.9)
MCHC RBC AUTO-ENTMCNC: 29.7 G/DL (ref 31.4–35)
MCV RBC AUTO: 93.6 FL (ref 79.6–97.8)
MONOCYTES # BLD: 0.5 K/UL (ref 0.1–1.3)
MONOCYTES NFR BLD: 9 % (ref 4–12)
NEUTS SEG # BLD: 3.6 K/UL (ref 1.7–8.2)
NEUTS SEG NFR BLD: 66 % (ref 43–78)
NRBC # BLD: 0 K/UL (ref 0–0.2)
PLATELET # BLD AUTO: 199 K/UL (ref 150–450)
PMV BLD AUTO: 10.5 FL (ref 9.4–12.3)
POTASSIUM SERPL-SCNC: 4.4 MMOL/L (ref 3.5–5.1)
PROT SERPL-MCNC: 7.1 G/DL (ref 6.3–8.2)
RBC # BLD AUTO: 2.98 M/UL (ref 4.05–5.25)
SODIUM SERPL-SCNC: 139 MMOL/L (ref 136–145)
TIBC SERPL-MCNC: 274 UG/DL (ref 250–450)
WBC # BLD AUTO: 5.5 K/UL (ref 4.3–11.1)

## 2019-01-15 PROCEDURE — 85025 COMPLETE CBC W/AUTO DIFF WBC: CPT

## 2019-01-15 PROCEDURE — 36415 COLL VENOUS BLD VENIPUNCTURE: CPT

## 2019-01-15 PROCEDURE — 80053 COMPREHEN METABOLIC PANEL: CPT

## 2019-01-15 PROCEDURE — 3331090002 HH PPS REVENUE DEBIT

## 2019-01-15 PROCEDURE — 82728 ASSAY OF FERRITIN: CPT

## 2019-01-15 PROCEDURE — 96372 THER/PROPH/DIAG INJ SC/IM: CPT

## 2019-01-15 PROCEDURE — 74011250636 HC RX REV CODE- 250/636: Performed by: INTERNAL MEDICINE

## 2019-01-15 PROCEDURE — 83540 ASSAY OF IRON: CPT

## 2019-01-15 PROCEDURE — 3331090001 HH PPS REVENUE CREDIT

## 2019-01-15 RX ADMIN — EPOETIN ALFA-EPBX 60000 UNITS: 40000 INJECTION, SOLUTION INTRAVENOUS; SUBCUTANEOUS at 11:51

## 2019-01-15 NOTE — TELEPHONE ENCOUNTER
Mrs. Pawan Wallace saw Dr. Elier Calderon today and was restarted on Procrit every 3 weeks for her anemia. She said if the nurse gives it fast, it really hurts, but the nurse gave slowly today and it hurt less. No other new medication. She had been on the Procrit in the past so was already familiar with it. No issues at this time. I asked her to call with any medication questions.

## 2019-01-15 NOTE — PROGRESS NOTES
Arrived to the [facility]. PROCRIT completed. Patient tolerated well. Any issues or concerns during appointment: none. Patient aware of next infusion appointment on 2/5/19 at 1330. Discharged via wheelchair.  
 
 
Sumeet Rios RN

## 2019-01-16 ENCOUNTER — HOME CARE VISIT (OUTPATIENT)
Dept: SCHEDULING | Facility: HOME HEALTH | Age: 69
End: 2019-01-16
Payer: MEDICARE

## 2019-01-16 VITALS
TEMPERATURE: 98.5 F | SYSTOLIC BLOOD PRESSURE: 134 MMHG | OXYGEN SATURATION: 96 % | BODY MASS INDEX: 39.32 KG/M2 | DIASTOLIC BLOOD PRESSURE: 66 MMHG | WEIGHT: 215 LBS | HEART RATE: 68 BPM | RESPIRATION RATE: 16 BRPM

## 2019-01-16 VITALS
OXYGEN SATURATION: 94 % | WEIGHT: 215 LBS | TEMPERATURE: 97.8 F | RESPIRATION RATE: 17 BRPM | DIASTOLIC BLOOD PRESSURE: 56 MMHG | BODY MASS INDEX: 39.32 KG/M2 | HEART RATE: 72 BPM | SYSTOLIC BLOOD PRESSURE: 124 MMHG

## 2019-01-16 VITALS
OXYGEN SATURATION: 97 % | RESPIRATION RATE: 17 BRPM | HEART RATE: 77 BPM | SYSTOLIC BLOOD PRESSURE: 122 MMHG | TEMPERATURE: 97.9 F | DIASTOLIC BLOOD PRESSURE: 70 MMHG

## 2019-01-16 LAB
INR BLD: 2.3 (ref 0.9–1.1)
PT POC: 28.2 SECONDS (ref 11.8–14.9)

## 2019-01-16 PROCEDURE — 3331090002 HH PPS REVENUE DEBIT

## 2019-01-16 PROCEDURE — G0299 HHS/HOSPICE OF RN EA 15 MIN: HCPCS

## 2019-01-16 PROCEDURE — 3331090001 HH PPS REVENUE CREDIT

## 2019-01-16 NOTE — PROGRESS NOTES
Home Visit # Health  arrived at residence to find the patient alert and oriented per the patients norms, in no acute distress and without complaint. Patient is just returning from office visit with Dr. Ana Bravo. Patient is eating a lunch of fast food chicken nuggets and BBQ sauce. Patient states that she feels good weights have been down for last two days and she is urinating often. Patient did not take additional diuretic yesterday. Aid will continue to assist with weights and she has spoken to her family regarding the Blue tooth enabled scale. No discission at present. Patient advised that she would contact for shortness of breath or weight increase. HC to see in home next week. Lung Sounds:Clear and equal bilaterally. Weight: 215 lb down from previous weigh Edema: No notable edema to bilateral lower extremities and the abdomen is soft and non-tender. B/P 134/66  [place] Sitting Follow Up Appointments: 
1/17  [address] [place] [address] Transportation: Aid Medications: All medicines are on hand and the patient remains compliant. Education:Reviewed via teach back the CHF action plan to include low sodium diet and fluid restrictions and early interventions. DME: Patient states that she has been compliant with Trilogy use and has been sleeping better and continues to wear 02 @ 3 lpm continuous. Safety Concerns: None Patient/Family Concerns: None Tasks:None Physical Assessment completed and noted on CHF assessment Form. Help line discussed. Will follow up with pt in one week via home visit. End of Visit. Delbert Laurent, Paramedic Health

## 2019-01-17 ENCOUNTER — HOSPITAL ENCOUNTER (OUTPATIENT)
Dept: WOUND CARE | Age: 69
Discharge: HOME | End: 2019-01-17
Attending: SURGERY
Payer: MEDICARE

## 2019-01-17 VITALS
HEART RATE: 69 BPM | DIASTOLIC BLOOD PRESSURE: 59 MMHG | TEMPERATURE: 97.3 F | RESPIRATION RATE: 20 BRPM | SYSTOLIC BLOOD PRESSURE: 113 MMHG | OXYGEN SATURATION: 94 %

## 2019-01-17 DIAGNOSIS — R53.81 PHYSICAL DEBILITY: Chronic | ICD-10-CM

## 2019-01-17 DIAGNOSIS — E11.21 TYPE 2 DIABETES MELLITUS WITH NEPHROPATHY (HCC): Chronic | ICD-10-CM

## 2019-01-17 DIAGNOSIS — L98.9 SKIN DEFECT: ICD-10-CM

## 2019-01-17 DIAGNOSIS — E66.01 SEVERE OBESITY (HCC): ICD-10-CM

## 2019-01-17 DIAGNOSIS — Z79.01 ANTICOAGULATED ON COUMADIN: Chronic | ICD-10-CM

## 2019-01-17 PROCEDURE — 29581 APPL MULTLAYER CMPRN SYS LEG: CPT

## 2019-01-17 PROCEDURE — 3331090001 HH PPS REVENUE CREDIT

## 2019-01-17 PROCEDURE — 3331090002 HH PPS REVENUE DEBIT

## 2019-01-17 PROCEDURE — 99214 OFFICE O/P EST MOD 30 MIN: CPT | Performed by: SURGERY

## 2019-01-17 NOTE — PROGRESS NOTES
WOUND CENTER Consult Note/Progress Note:  
Audra Delgado  MRN: [id number]  [id number]  Age:68 y.o. 
 
HPI: Audra Delgado is a 77 y.o. female who is a 77 y.o. female \"with extensive medical history as noted below on chronic coumadin who initially presented to ER  after fall in bathroom  where she landed directly on her left knee with complaints of dizziness and fatigue and found to have Hgb of 6.7.  She was transfused to Hgb of 9.6.  She also was found with a large hematoma/bullae over her left knee and INR of 4.3.  She underwent an I+D per Dr Naty Perez on  with a large amount of serosanguinous fluid evacuated. Fatemeh Handy has been followed by home health PT, OT and wound care since discharge from rehab on ninth floor -. Fatemeh Handy has done well except the wound has not closed.  Two days ago, [facility] RN and daughter who is an RN noted mild erythema and heat surrounding upper half of wound.  Open area approx 5-6 cm in circumference.  No purulent drainage, oozing small amounts of dark blood with slightly foul odor.  Full ROM to knee.  All distal circulation, motion and sensation WNL for patient.  No distal edema, mild local edema.  Multiple old scars from prior lower extremity wounds.  Patient denies pain to area, fever, systemic symptoms.  She does not appear septic and lactic acid is 1.8.  WBC is 11.6 without shift.  Wound cultured in ER prior to administration of antibiotics.  Begun on vancomycin and ceftriaxone until cultures resulted. Froy Pérez, attentive family at bedside. Creatinine 1.48 on admission with baseline of 1.2.  Of note, patient reports diarrhea x 4 days without use of antibiotics, additional GI symptoms. \"   
  
  
10/7/18: Pt sitting up on side of the bed just finishing breakfast. No complaints. AF, NAD. WBC 8.8, Pt taking Coumadin 2.5mg qhs. INR 3.0. 
10/8/18: POD1 s/p left knee debridement; awake in bed, no complaints. AF, VSS. Cx growing P. Mirabilis. 10/9/18: POD2 s/p left knee debridement; awake in chair, no complaints. AF, VSS. Cx growing P. Mirabilis. Vac placed today and functioning without leak 10/10/18: POD3 s/p left knee debridement; awake in chair, no complaints. AF, VSS. Cx growing P. Mirabilis. Vac functioning without leak or bleeding This information was obtained from the patient The following HPI elements were documented for the patient's wound: 
Location: L below knee anterior Duration: 8/28/2018 Severity:  No pain Context:  Fall in bathroom on coumadin, secondary infection Modifying Factors:  Nothing makes better or worse. Improved with debridement on 10/7/2018 and VAC Quality:  n/a Timing:  n/a Associated Signs and Symptoms: 
 
 
 
Past Medical History:  
Diagnosis Date  Anticoagulated on Coumadin 7/9/2013 S/P AVR  CAD (coronary artery disease) 1/20/2013 5/8/14 PCI LAD with stent placed  Cardiomyopathy ([address]  CHF (congestive heart failure) ([address] 2/17/2017  CKD (chronic kidney disease) stage 3, GFR 30-59 ml/min ([facility]) 7/10/2013  COPD (chronic obstructive pulmonary disease) ([address] 4/2/2014  Diabetes ([address]  Essential hypertension, benign 1/20/2013  HLD (hyperlipidemia)  ICD (implantable cardioverter-defibrillator) in place 10/2/2014 Biotronik single-chamber ICD implantation 10/20/14  Iron deficiency anemia due to chronic blood loss 7/29/2009  MDS (myelodysplastic syndrome) ([address] 12/17/2011 Procrit started in August, 2011 12/18/11 Procrit weekly and Iron stores. 5-12 12-13-12 good response to 3 weekly procrit did not need it last time  3-7-13 Pt doing well. Just wanted a \"check-up. \" Responding to Procrit every three weeks. 8-29-13 patient has missed some injections on recently restarted hemoglobin only issue , takes oral iron iron stores each time  REJI (obstructive sleep apnea)-cpap 4/2/2014  Osteoarthritis  Osteopenia  Pulmonary hypertension ([address] 6/15/2016  Quadrantanopia  Skin defect 2018  Visual complaint 2015 Past Surgical History:  
Procedure Laterality Date [address]    HX AORTIC VALVE REPLACEMENT  ,   
 mechanical valve   HX  SECTION    
 HX CORONARY STENT PLACEMENT  May, 2014 STEMI with Stent placement.  HX ENDOSCOPY  2009 EGD [address] CATHETERIZATION    HX PACEMAKER  10/2/2014 Biotronik ICD  HX TUBAL LIGATION    
 IR BX BONE MARROW DIAGNOSTIC  2011 Current Outpatient Medications Medication Sig  
 traMADol (ULTRAM) 50 mg tablet Take 1 Tab by mouth every six (6) hours as needed for Pain. Max Daily Amount: 200 mg.  cholecalciferol, vitamin D3, (VITAMIN D3) 2,000 unit tab Take 2,000 Units by mouth daily.  warfarin (COUMADIN) 5 mg tablet Take 5 mg by mouth every Monday, Wednesday, Friday. Take as directed: 
take one 5mg tablet on Monday, Wednesday, Friday 
take 2.5mg (half of 5mg tablet) on all other days , Thurs, Sat, Sun  
 spironolactone (ALDACTONE) 25 mg tablet Take 25 mg by mouth daily.  nitroglycerin (NITROSTAT) 0.4 mg SL tablet 1 Tab by SubLINGual route every five (5) minutes as needed for Chest Pain.  sertraline (ZOLOFT) 50 mg tablet Take 1 Tab by mouth daily.  simvastatin (ZOCOR) 20 mg tablet Take 1 Tab by mouth nightly.  sacubitril-valsartan (ENTRESTO) 24 mg/26 mg tablet Take 1 Tab by mouth two (2) times a day.  fluticasone-vilanterol (BREO ELLIPTA) 200-25 mcg/dose inhaler Take 1 Puff by inhalation daily.  OTHER Trilogy  mometasone-formoterol (DULERA) 200-5 mcg/actuation HFA inhaler 2 puffs bid, rinse mouth after use. (Patient taking differently: Take 2 Puffs by inhalation two (2) times daily as needed.)  isosorbide mononitrate ER (IMDUR) 30 mg tablet Take 30 mg by mouth daily.  aspirin delayed-release 81 mg tablet Take 81 mg by mouth daily.  warfarin (COUMADIN) 2.5 mg tablet Take 2.5 mg by mouth nightly. Needs 5mg on Friday  docusate sodium (COLACE) 50 mg capsule Take 50 mg by mouth daily.  carvedilol (COREG) 6.25 mg tablet Take 1 Tab by mouth two (2) times daily (with meals). (Patient taking differently: Take 3.125 mg by mouth two (2) times daily (with meals). )  furosemide (LASIX) 40 mg tablet Take 1 Tab by mouth daily.  traZODone (DESYREL) 50 mg tablet Take 0.5-1 Tabs by mouth nightly. (Patient taking differently: Take 25-50 mg by mouth as needed.)  multivit-minerals/folic acid (ADULT ONE DAILY MULTIVITAMIN PO) Take 1 Tab by mouth daily.  ascorbic acid, vitamin C, (VITAMIN C) 500 mg tablet Take 500 mg by mouth daily.  ferrous gluconate 324 mg (38 mg iron) tablet TAKE 1 TAB BY MOUTH DAILY (BEFORE BREAKFAST). INDICATIONS: IRON DEFICIENCY ANEMIA (Patient taking differently: Take 325 mg by mouth two (2) times daily (with meals). )  fluticasone (FLONASE) 50 mcg/actuation nasal spray 2 Sprays by Both Nostrils route daily as needed. (Patient taking differently: 2 Sprays by Both Nostrils route daily as needed for Allergies.)  loratadine (CLARITIN) 10 mg tablet TAKE 1 TAB BY MOUTH DAILY. (Patient taking differently: TAKE 1 TAB BY MOUTH DAILY PRN)  albuterol (PROVENTIL VENTOLIN) 2.5 mg /3 mL (0.083 %) nebulizer solution 3 mL by Nebulization route every four (4) hours as needed for Wheezing.  albuterol (VENTOLIN HFA) 90 mcg/actuation inhaler 2 puffs qid prn (Patient taking differently: Take 2 Puffs by inhalation every six (6) hours as needed for Wheezing or Shortness of Breath.)  acetaminophen (TYLENOL) 325 mg tablet Take 650 mg by mouth every four (4) hours as needed for Pain.  OXYGEN-AIR DELIVERY SYSTEMS 3 L by Nasal route continuous. No current facility-administered medications for this encounter. ALLERGIES: Sulfa (sulfonamide antibiotics) and Aspirin Social History Socioeconomic History  Marital status:  Spouse name: Not on file  Number of children: Not on file  Years of education: Not on file  Highest education level: Not on file Occupational History Employer: RETIRED Comment: [employer] Tobacco Use  Smoking status: Former Smoker Packs/day: 0.25 Years: 42.00 Pack years: 10.50 Types: Cigarettes Start date: 10/1/1956 Last attempt to quit: 2014 Years since quittin.7  Smokeless tobacco: Never Used Substance and Sexual Activity  Alcohol use: No  
  Alcohol/week: 0.0 oz  Drug use: No  
Other Topics Concern  Weight Concern Yes  Special Diet Yes Social History Narrative Lives with her daughter. Sara Zuniga and Matt Montez are caregivers. Ambulates only short distances. Social History Tobacco Use Smoking Status Former Smoker  Packs/day: 0.25  
 Years: 42.00  
 Pack years: 10.50  Types: Cigarettes  Start date: 10/1/1956  Last attempt to quit: 2014  Years since quittin.7 Smokeless Tobacco Never Used Family History Problem Relation Age of Onset  Heart Disease Mother CHF  Hypertension Mother  Kidney Disease Mother  Heart Disease Father CHF  Lung Disease Father  Diabetes Father  Cancer Father   
     prostate  Hypertension Father  Heart Attack Father  Heart Disease Maternal Aunt  Diabetes Brother  Coronary Artery Disease Other  Breast Cancer Neg Hx   
 
 
ROS: The patient has no difficulty with chest pain or shortness of breath. No fever or chills. Comprehensive review of systems was otherwise unremarkable except as noted above. Physical Exam:  
Visit Vitals /59 (BP 1 Location: Left arm, BP Patient Position: Sitting) Pulse 69 Temp 97.3 °F (36.3 °C) Resp 20 SpO2 94% Constitutional: Alert, oriented, cooperative patient in no acute distress; appears stated age;  General appearance is within normal limits for wound care patient population. See the today's recorded vitals signs and constitutional data. Eyes: Pupils equal; Sclera are clear. EOMs intact; The eyes appear to track and move normally. The sclera are not injected. The conjunctive are clear. The eyelids are normal. There is no scleral icterus. ENMT: There are no obvious external ear, nose, lip or mouth lesions. Nares normal; Neck: Overall contour of the neck is normal with no obvious neck masses. Gross hearing is within normal limits. No obvious neck masses CV: RRR;  no JVD; No evidence of cyanosis of the upper extremities. The extremities are perfused without embolic sign, splinter hemorrhages, or petechia. Resp:  Breathing is non-labored; normal rate and effort; no audible wheezing. GI: soft and non-distended without acute abnormality noted. Musculoskeletal: unremarkable with normal function. No embolic signs or cyanosis. Neuro:  Oriented; moves all 4; no focal deficits Psychiatric: Judgement and insight are within normal limits for the wound care population of patients. Patient is oriented to person, place, and time. Recent and remote memory are within normal limits. Mood and affect are within normal limits. Integumentary (Skin/Wounds) See inspection of wound(s) below. There are no other skin areas of palpable concern. See wound center documentation including photos in the [address] Wound Template made part of this record by reference. Wounds: 
Wound Knee Anterior; Left (Active) DRESSING STATUS Clean, dry, and intact 1/17/2019  1:15 PM  
DRESSING TYPE Negative pressure wound therapy 12/6/2018  3:57 PM  
Non-Pressure Injury Full thickness (subcut/muscle) 12/13/2018  4:45 PM  
Wound Length (cm) 3.5 cm 1/17/2019  1:15 PM  
Wound Width (cm) 5.7 cm 1/17/2019  1:15 PM  
Wound Depth (cm) 0.1 1/17/2019  1:15 PM  
 Wound Surface area (cm^2) 19.95 cm^2 1/17/2019  1:15 PM  
Change in Wound Size % 64.54 1/17/2019  1:15 PM  
Condition of Base Granulation 1/17/2019  1:15 PM  
Condition of Edges Open 11/1/2018  1:43 PM  
Tissue Type Red 11/1/2018  1:43 PM  
Tissue Type Percent Red 100 1/17/2019  1:15 PM  
Drainage Amount  Moderate 1/17/2019  1:15 PM  
Drainage Color Serosanguinous 1/17/2019  1:15 PM  
Wound Odor None 1/17/2019  1:15 PM  
Periwound Skin Condition Calloused; Intact 1/17/2019  1:15 PM  
Cleansing and Cleansing Agents  Normal saline 1/17/2019  1:15 PM  
Procedure Tolerated Well 12/13/2018  4:45 PM  
Number of days: 91  
   
[REMOVED] Wound Knee Left (Removed) Number of days: 29  
   
[REMOVED] Wound (Removed) Number of days: 6 [REMOVED] Wound Leg Lower Anterior; Inferior;Left;Lower (Removed) Number of days:   
  
 
 
 
 
 
Recent vitals (if inpt): 
Patient Vitals for the past 24 hrs: 
 BP Temp Pulse Resp SpO2  
01/17/19 1312 113/59 97.3 °F (36.3 °C) 69 20 94 % Labs: 
Recent Labs  
  01/16/19 
1217  01/15/19 
1031 WBC  --   --  5.5 HGB  --   --  8.3*  
PLT  --   --  199 NA  --   --  139 K  --   --  4.4  
CL  --   --  97* CO2  --   --  40* BUN  --   --  56* CREA  --   --  2.05* GLU  --   --  184* INR 2.3*   < >  --   
TBILI  --   --  0.5 SGOT  --   --  8* ALT  --   --  17  
AP  --   --  61  
 < > = values in this interval not displayed. Lab Results Component Value Date/Time WBC 5.5 01/15/2019 10:31 AM  
 HGB 8.3 (L) 01/15/2019 10:31 AM  
 PLATELET 792 34/86/9282 10:31 AM  
 Sodium 139 01/15/2019 10:31 AM  
 Potassium 4.4 01/15/2019 10:31 AM  
 Chloride 97 (L) 01/15/2019 10:31 AM  
 CO2 40 (H) 01/15/2019 10:31 AM  
 BUN 56 (H) 01/15/2019 10:31 AM  
 Creatinine 2.05 (H) 01/15/2019 10:31 AM  
 Glucose 184 (H) 01/15/2019 10:31 AM  
 INR 1.7 01/02/2019 06:23 AM  
 INR, External 2.3 01/16/2019  INR POC 2.3 (A) 01/16/2019 12:17 PM  
 aPTT 60.2 (H) 10/10/2018 04:02 AM  
 Bilirubin, total 0.5 01/15/2019 10:31 AM  
 AST (SGOT) 8 (L) 01/15/2019 10:31 AM  
 ALT (SGPT) 17 01/15/2019 10:31 AM  
 Alk. phosphatase 61 01/15/2019 10:31 AM  
 Amylase 88 01/31/2012 03:12 PM  
 Lipase 157 10/08/2018 04:26 AM  
 Lactic acid 0.7 02/16/2016 06:16 AM  
 Troponin-I <0.05 01/31/2012 03:32 PM  
 Troponin-I, Qt. 0.05 12/31/2018 12:56 PM  
 
 
I reviewed recent labs and recent radiologic studies. I independently reviewed radiology images for studies I described above or studies I have ordered. Admission date (for inpatients): 1/17/2019 * No surgery found *  * No surgery found * ASSESSMENT/PLAN: 
Significant improvement since discharge from the hospital status post debridement on 10/7/2018 and VAC placement. She had PuraplyAM skin substitute graft on 11/1/2018. The VAC was maintained for 1 week without change. The wound is large and full-thickness. She was scheduled for 1 week follow-up after evaluation on 11/8. She became hospitalized and missed her follow-up appointments. She was hospitalized with respiratory issues and is on home oxygen as well as her chronic anticoagulation. She has developed some bloody drainage from the [facility]. The wound has improved dramatically with 100% granulation base. PuraplyAM #2 was placed on 12/6 and well tolerated. We discontinued the VAC since it had filled with blood. However, on 12/20 her wound had a very bad odor. The graft was somewhat soupy. The graft was intact. There appears to be an over abundance of granulation. We did not place another graft, but dressed with Hydrofera Blue ready. The wound did calm down but did not require any silver nitrate. However the dressing has come down from her knee. We will need to immobilize her knee better and wrap at higher to maintain a good dressing. This will be critical to perform any grafting procedure without stabilization with a VAC.   She missed 1/3 appointment due to being hospitalized for CHF exacerbation. We obtained a knee immobilizer. We will try for the next week to see stable dressing. I will not doing grafting procedures until we have stabilization of the wound and dressings. I will see her back next week for reevaluation and possible grafting. Problem List  Date Reviewed: 1/15/2019 Codes Class Noted Severe obesity ([facility] ICD-10-CM: E66.01 
ICD-9-CM: 278.01  1/15/2019 Skin defect ICD-10-CM: L98.9 ICD-9-CM: 709.8  1/10/2019 Systolic CHF, acute on chronic (HCC) ICD-10-CM: J70.06 ICD-9-CM: 428.23, 428.0  12/31/2018 Acute on chronic combined systolic and diastolic CHF (congestive heart failure) (HCC) ICD-10-CM: I50.43 ICD-9-CM: 428.43, 428.0  12/31/2018 Acute exacerbation of CHF (congestive heart failure) (HCC) ICD-10-CM: I50.9 ICD-9-CM: 428.0  11/14/2018 Debility ICD-10-CM: R53.81 ICD-9-CM: 799.3  9/8/2018 Anemia (Chronic) ICD-10-CM: D64.9 ICD-9-CM: 285.9  9/7/2018 Chronic respiratory failure with hypoxia [facility]) ICD-10-CM: J96.11 
ICD-9-CM: 518.83, 799.02  9/7/2018 Encounter for immunization ICD-10-CM: Y68 ICD-9-CM: V03.89  8/21/2018 Physical debility (Chronic) ICD-10-CM: R53.81 ICD-9-CM: 799.3  5/2/2018 Closed nondisplaced fracture of shaft of fifth metacarpal bone of left hand ICD-10-CM: A17.090A ICD-9-CM: 815.03  4/28/2018 Type 2 diabetes mellitus with nephropathy (HCC) (Chronic) ICD-10-CM: E11.21 
ICD-9-CM: 250.40, 583.81  12/18/2017 S/P AVR (aortic valve replacement) (Chronic) ICD-10-CM: Z95.2 ICD-9-CM: V43.3  Unknown Overview Signed 2/23/2016 11:04 AM by Jana Silvestre Mechanical/Artific Cardiomyopathy ([facility] (Chronic) ICD-10-CM: I42.9 ICD-9-CM: 425.4  Unknown Osteopenia ICD-10-CM: M85.80 ICD-9-CM: 733.90  Unknown HLD (hyperlipidemia) (Chronic) ICD-10-CM: O61.2 ICD-9-CM: 272.4  Unknown  Osteoarthritis ICD-10-CM: M19.90 
 ICD-9-CM: 715.90  Unknown  
   
 ICD (implantable cardioverter-defibrillator) in place ICD-10-CM: Z95.810 ICD-9-CM: V45.02  10/2/2014 Overview Signed 10/2/2014  4:58 PM by Amira Grimm Biotronik single-chamber ICD implantation 10/20/14 COPD (chronic obstructive pulmonary disease) (HCC) (Chronic) ICD-10-CM: J44.9 ICD-9-CM: 244  4/2/2014 Overview Signed 10/6/2018  8:56 PM by Kvng Paez NP  
  HOME OXYGEN 3 LITERS 
  
  
   
 REJI (obstructive sleep apnea)-cpap (Chronic) ICD-10-CM: G47.33 
ICD-9-CM: 327.23  4/2/2014 CKD (chronic kidney disease) stage 3, GFR 30-59 ml/min (HCC) (Chronic) ICD-10-CM: N18.3 ICD-9-CM: 585.3  7/10/2013 Anticoagulated on Coumadin (Chronic) ICD-10-CM: Z51.81, Z79.01 
ICD-9-CM: V58.83, V58.61  7/9/2013 Overview Signed 7/9/2013  4:16 PM by Mar Primrose, NP  
  S/P AVR 
  
  
   
 CAD (coronary artery disease) (Chronic) ICD-10-CM: I25.10 ICD-9-CM: 414.00  1/20/2013 Overview Signed 5/20/2014 10:05 AM by Juice Barksdale NP  
  5/8/14 PCI LAD with stent placed Chronic combined systolic and diastolic heart failure (HCC) (Chronic) ICD-10-CM: I50.42 
ICD-9-CM: 428.42  1/20/2013 Overview Addendum 4/28/2018  4:53 AM by Autumn Pino MD  
  5/8/14 ECHO:  EF 10-15% 12/2017:  EF 25-30% Essential hypertension, benign (Chronic) ICD-10-CM: I10 
ICD-9-CM: 401.1  1/20/2013 MDS (myelodysplastic syndrome) (HCC) (Chronic) ICD-10-CM: D46.9 ICD-9-CM: 238.75  12/17/2011 Overview Addendum 8/29/2013 11:06 AM by Tabatha Due Procrit started in August, 2011 12/18/11 Procrit weekly and Iron stores. 5-12 12-13-12 good response to 3 weekly procrit did not need it last time 3-7-13 Pt doing well. Just wanted a \"check-up. \" Responding to Procrit every three weeks. 8-29-13 patient has missed some injections on recently restarted hemoglobin only issue , takes oral iron iron stores each time Iron deficiency anemia due to chronic blood loss (Chronic) ICD-10-CM: D50.0 ICD-9-CM: 280.0  7/29/2009 Active Problems: * No active hospital problems. * Any procedures done today in the wound center are documented in a separate note in connect care made part of this record by reference Wound Care Orders Placed This Encounter  WOUND CARE, DRESSING CHANGE Left anterior knee wound: 
Cleanse wound with normal saline. Apply Hydrofera Ready, ABD, and wrap with Coban 2. Home health to change dressing 3 times per week. ONLY REMOVE KNEE IMMOBILIZER when dressing is being changed and when changing your pants. Plan for cellutome next week if compliant with knee immobilizer. Standing Status:   Standing Number of Occurrences:   1 Follow-up Information Follow up With Specialties Details Why Contact Info [facility] Wound Care In 1 week  [phone] 
[phone] Signed:  Philipp Felipe MD,  FACS

## 2019-01-17 NOTE — WOUND CARE
[address] [place] [address] [address] Phone: [phone] Fax: [phone] Patient: Kate Mc MRN: [id number]  SSN: [id number] YOB: 1950  Age: 77 y.o. Sex: female Return Appointment: 1 week with Isha Hall MD 
 
Instructions:  
 
Left anterior knee wound: 
Cleanse wound with normal saline. Apply Hydrofera Ready, ABD, and wrap with Coban 2. Home health to change dressing 3 times per week. ONLY REMOVE KNEE IMMOBILIZER when dressing is being changed and when changing your pants. Plan for cellutome next week if compliant with knee immobilizer. Should you experience increased redness, swelling, pain, foul odor, size of wound(s), or have a temperature over 101 degrees please contact the [address] [place] [address] Road at [phone] or if after hours contact your primary care physician or go to the hospital emergency department. Signed By: Tara Faulkner RN   
 January 17, 2019

## 2019-01-17 NOTE — WOUND CARE
01/17/19 1315 Wound Knee Anterior; Left Date First Assessed/Time First Assessed: 10/18/18 1505   POA: Yes  Wound Type: Trauma  Location: Knee  Wound Description (Optional): #1  Orientation: Anterior; Left DRESSING STATUS Clean, dry, and intact DRESSING TYPE (hydrofera ready, abd, coban 2) Wound Length (cm) 3.5 cm Wound Width (cm) 5.7 cm Wound Depth (cm) 0.1 Wound Surface area (cm^2) 19.95 cm^2 Change in Wound Size % 64.54 Condition of Base Granulation Tissue Type Percent Red 100 Drainage Amount  Moderate Drainage Color Serosanguinous Wound Odor None Periwound Skin Condition Calloused; Intact Cleansing and Cleansing Agents  Normal saline Patient currently taking coumadin.

## 2019-01-18 ENCOUNTER — HOME CARE VISIT (OUTPATIENT)
Dept: SCHEDULING | Facility: HOME HEALTH | Age: 69
End: 2019-01-18
Payer: MEDICARE

## 2019-01-18 PROCEDURE — 3331090002 HH PPS REVENUE DEBIT

## 2019-01-18 PROCEDURE — 3331090001 HH PPS REVENUE CREDIT

## 2019-01-19 PROCEDURE — 3331090001 HH PPS REVENUE CREDIT

## 2019-01-19 PROCEDURE — 3331090002 HH PPS REVENUE DEBIT

## 2019-01-20 PROCEDURE — 3331090001 HH PPS REVENUE CREDIT

## 2019-01-20 PROCEDURE — 3331090002 HH PPS REVENUE DEBIT

## 2019-01-21 ENCOUNTER — HOME CARE VISIT (OUTPATIENT)
Dept: SCHEDULING | Facility: HOME HEALTH | Age: 69
End: 2019-01-21
Payer: MEDICARE

## 2019-01-21 PROCEDURE — 3331090001 HH PPS REVENUE CREDIT

## 2019-01-21 PROCEDURE — G0299 HHS/HOSPICE OF RN EA 15 MIN: HCPCS

## 2019-01-21 PROCEDURE — 3331090003 HH PPS REVENUE ADJ

## 2019-01-21 PROCEDURE — 3331090002 HH PPS REVENUE DEBIT

## 2019-01-22 ENCOUNTER — PATIENT OUTREACH (OUTPATIENT)
Dept: RESPIRATORY THERAPY | Age: 69
End: 2019-01-22

## 2019-01-22 VITALS
TEMPERATURE: 97.8 F | SYSTOLIC BLOOD PRESSURE: 133 MMHG | OXYGEN SATURATION: 96 % | BODY MASS INDEX: 39.6 KG/M2 | WEIGHT: 216.5 LBS | DIASTOLIC BLOOD PRESSURE: 69 MMHG | HEART RATE: 70 BPM | RESPIRATION RATE: 17 BRPM

## 2019-01-22 PROCEDURE — 3331090001 HH PPS REVENUE CREDIT

## 2019-01-22 PROCEDURE — 3331090002 HH PPS REVENUE DEBIT

## 2019-01-23 ENCOUNTER — HOME CARE VISIT (OUTPATIENT)
Dept: SCHEDULING | Facility: HOME HEALTH | Age: 69
End: 2019-01-23
Payer: MEDICARE

## 2019-01-23 VITALS
SYSTOLIC BLOOD PRESSURE: 124 MMHG | DIASTOLIC BLOOD PRESSURE: 56 MMHG | TEMPERATURE: 99 F | OXYGEN SATURATION: 93 % | HEART RATE: 64 BPM | WEIGHT: 216 LBS | RESPIRATION RATE: 16 BRPM | BODY MASS INDEX: 39.51 KG/M2

## 2019-01-23 VITALS
SYSTOLIC BLOOD PRESSURE: 139 MMHG | TEMPERATURE: 98 F | BODY MASS INDEX: 39.51 KG/M2 | DIASTOLIC BLOOD PRESSURE: 61 MMHG | HEART RATE: 73 BPM | OXYGEN SATURATION: 96 % | WEIGHT: 216 LBS | RESPIRATION RATE: 18 BRPM

## 2019-01-23 LAB
INR BLD: 2.4 (ref 0.9–1.1)
PT POC: 29.1 SECONDS (ref 11.8–14.9)

## 2019-01-23 PROCEDURE — 3331090002 HH PPS REVENUE DEBIT

## 2019-01-23 PROCEDURE — 400014 HH F/U

## 2019-01-23 PROCEDURE — 3331090001 HH PPS REVENUE CREDIT

## 2019-01-23 PROCEDURE — G0299 HHS/HOSPICE OF RN EA 15 MIN: HCPCS

## 2019-01-23 NOTE — PROGRESS NOTES
Home Visit # 8 Health  arrived at residence to find the patient alert and oriented per the patients norms, in no acute distress and without complaint. Leg wound is healing well with a graph placed at last visit. Patient without complaint today. HC to follow in home next week. Lung Sounds:Clear and equal bilaterally. Weight: 216 lb. 1 lb increase from Fridays weight. Edema: No notable edema to bilateral lower extremities and the abdomen is soft and non-tender. B/P 124/56 LA Sitting Follow Up Appointments:  
Weekly Wound Care visit on Thursday and no MD appointments until February. Transportation: Aid Medications: All medicines are on hand and the patient remains compliant. Education:Reviewed via teach back the CHF action plan to include low sodium diet and fluid restrictions and early interventions. DME: Trilogy was checked this week and the patient received new tubings. 02 at 3 lpm via NC continuously. Safety Concerns: None Patient/Family Concerns: Wearing leg brace over graph. Tasks: None Physical Assessment completed and noted on CHF assessment Form. Help line discussed. Will follow up with pt in one week via home visit. End of Visit. Monica Juarez, Paramedic Health

## 2019-01-24 ENCOUNTER — TELEPHONE (OUTPATIENT)
Dept: HOME HEALTH SERVICES | Facility: HOME HEALTH | Age: 69
End: 2019-01-24

## 2019-01-24 ENCOUNTER — APPOINTMENT (OUTPATIENT)
Dept: WOUND CARE | Age: 69
End: 2019-01-24
Attending: SURGERY
Payer: MEDICARE

## 2019-01-24 PROCEDURE — 3331090002 HH PPS REVENUE DEBIT

## 2019-01-24 PROCEDURE — 3331090001 HH PPS REVENUE CREDIT

## 2019-01-25 ENCOUNTER — HOME CARE VISIT (OUTPATIENT)
Dept: SCHEDULING | Facility: HOME HEALTH | Age: 69
End: 2019-01-25
Payer: MEDICARE

## 2019-01-25 VITALS
SYSTOLIC BLOOD PRESSURE: 110 MMHG | HEART RATE: 79 BPM | OXYGEN SATURATION: 94 % | TEMPERATURE: 97.8 F | RESPIRATION RATE: 18 BRPM | DIASTOLIC BLOOD PRESSURE: 50 MMHG

## 2019-01-25 PROCEDURE — 3331090001 HH PPS REVENUE CREDIT

## 2019-01-25 PROCEDURE — G0299 HHS/HOSPICE OF RN EA 15 MIN: HCPCS

## 2019-01-25 PROCEDURE — 3331090002 HH PPS REVENUE DEBIT

## 2019-01-25 NOTE — TELEPHONE ENCOUNTER
Mrs. Low Brown said she was doing good today. Wednesday she had diarrhea all day and it made her so weak that she did not feel like going to the wound center on Thursday. She is much better today. No medication changes and no questions. She sounded very . I asked her to call with any medication issues.

## 2019-01-26 PROCEDURE — 3331090002 HH PPS REVENUE DEBIT

## 2019-01-26 PROCEDURE — 3331090001 HH PPS REVENUE CREDIT

## 2019-01-27 PROCEDURE — 3331090001 HH PPS REVENUE CREDIT

## 2019-01-27 PROCEDURE — 3331090002 HH PPS REVENUE DEBIT

## 2019-01-28 ENCOUNTER — PATIENT OUTREACH (OUTPATIENT)
Dept: RESPIRATORY THERAPY | Age: 69
End: 2019-01-28

## 2019-01-28 PROCEDURE — 3331090002 HH PPS REVENUE DEBIT

## 2019-01-28 PROCEDURE — 3331090001 HH PPS REVENUE CREDIT

## 2019-01-28 NOTE — PROGRESS NOTES
Contacted patient to advise that I was en route for visit. Phone went to voice mail but the mailbox has not been set up. Arrived at the patients residence but there was no answer. Re-attempted to reach by phone without success. I went to the next visit scheduled and upon completion made an additional attempt to reach Mrs. Corral. A few moments later the patient contacted me to advise that she had overslept and had an appointment at 1400 hrs. She would not have time for a visit and to get dressed for the appointment. Stated that she had been doing well without edema and her weights have remained steady. Assured me that the aid would assist her in weighing today. Assures me that she will call immediately for problems concerns or weight gain. Will see this patient in home next week.

## 2019-01-29 PROCEDURE — 3331090001 HH PPS REVENUE CREDIT

## 2019-01-29 PROCEDURE — 3331090002 HH PPS REVENUE DEBIT

## 2019-01-30 ENCOUNTER — HOME CARE VISIT (OUTPATIENT)
Dept: SCHEDULING | Facility: HOME HEALTH | Age: 69
End: 2019-01-30
Payer: MEDICARE

## 2019-01-30 VITALS
TEMPERATURE: 97.9 F | OXYGEN SATURATION: 96 % | RESPIRATION RATE: 18 BRPM | HEART RATE: 62 BPM | DIASTOLIC BLOOD PRESSURE: 65 MMHG | SYSTOLIC BLOOD PRESSURE: 110 MMHG

## 2019-01-30 PROCEDURE — 3331090002 HH PPS REVENUE DEBIT

## 2019-01-30 PROCEDURE — G0299 HHS/HOSPICE OF RN EA 15 MIN: HCPCS

## 2019-01-30 PROCEDURE — 3331090001 HH PPS REVENUE CREDIT

## 2019-01-31 ENCOUNTER — HOSPITAL ENCOUNTER (OUTPATIENT)
Dept: WOUND CARE | Age: 69
Discharge: HOME | End: 2019-01-31
Attending: SURGERY
Payer: MEDICARE

## 2019-01-31 VITALS
WEIGHT: 218 LBS | HEIGHT: 62 IN | DIASTOLIC BLOOD PRESSURE: 66 MMHG | SYSTOLIC BLOOD PRESSURE: 136 MMHG | RESPIRATION RATE: 20 BRPM | TEMPERATURE: 98.3 F | HEART RATE: 72 BPM | BODY MASS INDEX: 40.12 KG/M2

## 2019-01-31 DIAGNOSIS — R53.81 DEBILITY: ICD-10-CM

## 2019-01-31 DIAGNOSIS — E11.21 TYPE 2 DIABETES MELLITUS WITH NEPHROPATHY (HCC): Chronic | ICD-10-CM

## 2019-01-31 DIAGNOSIS — L98.9 SKIN DEFECT: ICD-10-CM

## 2019-01-31 DIAGNOSIS — Z79.01 ANTICOAGULATED ON COUMADIN: Chronic | ICD-10-CM

## 2019-01-31 PROCEDURE — 99214 OFFICE O/P EST MOD 30 MIN: CPT | Performed by: SURGERY

## 2019-01-31 PROCEDURE — 29580 STRAPPING UNNA BOOT: CPT

## 2019-01-31 PROCEDURE — 3331090001 HH PPS REVENUE CREDIT

## 2019-01-31 PROCEDURE — 3331090002 HH PPS REVENUE DEBIT

## 2019-01-31 PROCEDURE — 74011000250 HC RX REV CODE- 250: Performed by: SURGERY

## 2019-01-31 RX ORDER — SILVER NITRATE 38.21; 12.74 MG/1; MG/1
1 STICK TOPICAL ONCE
Status: COMPLETED | OUTPATIENT
Start: 2019-01-31 | End: 2019-01-31

## 2019-01-31 RX ADMIN — SILVER NITRATE APPLICATORS 1 APPLICATOR: 25; 75 STICK TOPICAL at 15:19

## 2019-01-31 NOTE — WOUND CARE
[address] [place] [address] [address] Phone: [phone] Fax: [phone] Patient: Chris Pryor MRN: [id number]  SSN: [id number] YOB: 1950  Age: 77 y.o. Sex: female Return Appointment: 1 week with Talbot Litten, MD 
 
Instructions: Left anterior knee wound: 
Cleanse wound with normal saline. Apply Hydrofera Ready, ABD, and wrap with unna boot or coban 2. Home health to change dressing 3 times per week. Should you experience increased redness, swelling, pain, foul odor, size of wound(s), or have a temperature over 101 degrees please contact the [address] [place] [address] Road at [phone] or if after hours contact your primary care physician or go to the hospital emergency department. Signed By: Deana Rodriguez RN   
 January 31, 2019

## 2019-01-31 NOTE — PROGRESS NOTES
WOUND CENTER Consult Note/Progress Note:  
Kay Cruz  MRN: [id number]  EDWIN:[birth year]  Age:68 y.o. 
 
HPI: Kay Cruz is a 77 y.o. female who is a 77 y.o. female \"with extensive medical history as noted below on chronic coumadin who initially presented to ER 9/7 after fall in bathroom 8/28 where she landed directly on her left knee with complaints of dizziness and fatigue and found to have Hgb of 6.7.  She was transfused to Hgb of 9.6.  She also was found with a large hematoma/bullae over her left knee and INR of 4.3.  She underwent an I+D per Dr Nao Cutler on 9/11 with a large amount of serosanguinous fluid evacuated. America Ulloa has been followed by home health PT, OT and wound care since discharge from rehab on ninth floor 9/11-9/21. America Ulloa has done well except the wound has not closed.  Two days ago, Henrik Garvey RN and daughter who is an RN noted mild erythema and heat surrounding upper half of wound.  Open area approx 5-6 cm in circumference.  No purulent drainage, oozing small amounts of dark blood with slightly foul odor.  Full ROM to knee.  All distal circulation, motion and sensation WNL for patient.  No distal edema, mild local edema.  Multiple old scars from prior lower extremity wounds.  Patient denies pain to area, fever, systemic symptoms.  She does not appear septic and lactic acid is 1.8.  WBC is 11.6 without shift.  Wound cultured in ER prior to administration of antibiotics.  Begun on vancomycin and ceftriaxone until cultures resulted. Jaqueline Glaser, attentive family at bedside. Creatinine 1.48 on admission with baseline of 1.2.  Of note, patient reports diarrhea x 4 days without use of antibiotics, additional GI symptoms. \"   
  
  
10/7/18: Pt sitting up on side of the bed just finishing breakfast. No complaints. AF, NAD. WBC 8.8, Pt taking Coumadin 2.5mg qhs. INR 3.0. 
10/8/18: POD1 s/p left knee debridement; awake in bed, no complaints. AF, VSS. Cx growing P. Mirabilis. 10/9/18: POD2 s/p left knee debridement; awake in chair, no complaints. AF, VSS. Cx growing P. Mirabilis. Vac placed today and functioning without leak 10/10/18: POD3 s/p left knee debridement; awake in chair, no complaints. AF, VSS. Cx growing P. Mirabilis. Vac functioning without leak or bleeding This information was obtained from the patient The following HPI elements were documented for the patient's wound: 
Location: L below knee anterior Duration: 8/28/2018 Severity:  No pain Context:  Fall in bathroom on coumadin, secondary infection Modifying Factors:  Nothing makes better or worse. Improved with debridement on 10/7/2018 and VAC Quality:  n/a Timing:  n/a Associated Signs and Symptoms: 
 
 
 
Past Medical History:  
Diagnosis Date  Anticoagulated on Coumadin 7/9/2013 S/P AVR  CAD (coronary artery disease) 1/20/2013 5/8/14 PCI LAD with stent placed  Cardiomyopathy ([address]  CHF (congestive heart failure) ([address] 2/17/2017  CKD (chronic kidney disease) stage 3, GFR 30-59 ml/min ([facility]) 7/10/2013  COPD (chronic obstructive pulmonary disease) ([address] 4/2/2014  Diabetes ([address]  Essential hypertension, benign 1/20/2013  HLD (hyperlipidemia)  ICD (implantable cardioverter-defibrillator) in place 10/2/2014 Bi[employer]ronik single-chamber ICD implantation 10/20/14  Iron deficiency anemia due to chronic blood loss 7/29/2009  MDS (myelodysplastic syndrome) ([address] 12/17/2011 Procrit started in August, 2011 12/18/11 Procrit weekly and Iron stores. 5-12 12-13-12 good response to 3 weekly procrit did not need it last time  3-7-13 Pt doing well. Just wanted a \"check-up. \" Responding to Procrit every three weeks. 8-29-13 patient has missed some injections on recently restarted hemoglobin only issue , takes oral iron iron stores each time  REJI (obstructive sleep apnea)-cpap 4/2/2014  Osteoarthritis  Osteopenia  Pulmonary hypertension ([address] 6/15/2016  Quadrantanopia  Skin defect 2018  Visual complaint 2015 Past Surgical History:  
Procedure Laterality Date [address]    HX AORTIC VALVE REPLACEMENT  ,   
 mechanical valve   HX  SECTION    
 HX CORONARY STENT PLACEMENT  May, 2014 STEMI with Stent placement.  HX ENDOSCOPY  2009 EGD [address] CATHETERIZATION    HX PACEMAKER  10/2/2014 Bi[employer]ronik ICD  HX TUBAL LIGATION    
 IR BX BONE MARROW DIAGNOSTIC  2011 Current Outpatient Medications Medication Sig  carvedilol (COREG) 6.25 mg tablet Take 1 Tab by mouth two (2) times daily (with meals).  traMADol (ULTRAM) 50 mg tablet Take 1 Tab by mouth every six (6) hours as needed for Pain. Max Daily Amount: 200 mg.  cholecalciferol, vitamin D3, (VITAMIN D3) 2,000 unit tab Take 2,000 Units by mouth daily.  warfarin (COUMADIN) 5 mg tablet Take 5 mg by mouth every Monday, Wednesday, Friday. Take as directed: 
take one 5mg tablet on Monday, Wednesday, Friday 
take 2.5mg (half of 5mg tablet) on all other days , Thurs, Sat, Sun  
 spironolactone (ALDACTONE) 25 mg tablet Take 25 mg by mouth daily.  nitroglycerin (NITROSTAT) 0.4 mg SL tablet 1 Tab by SubLINGual route every five (5) minutes as needed for Chest Pain.  sertraline (ZOLOFT) 50 mg tablet Take 1 Tab by mouth daily.  simvastatin (ZOCOR) 20 mg tablet Take 1 Tab by mouth nightly.  sacubitril-valsartan (ENTRESTO) 24 mg/26 mg tablet Take 1 Tab by mouth two (2) times a day.  fluticasone-vilanterol (BREO ELLIPTA) 200-25 mcg/dose inhaler Take 1 Puff by inhalation daily.  OTHER Trilogy  mometasone-formoterol (DULERA) 200-5 mcg/actuation HFA inhaler 2 puffs bid, rinse mouth after use. (Patient taking differently: Take 2 Puffs by inhalation two (2) times daily as needed.)  isosorbide mononitrate ER (IMDUR) 30 mg tablet Take 30 mg by mouth daily.  aspirin delayed-release 81 mg tablet Take 81 mg by mouth daily.  warfarin (COUMADIN) 2.5 mg tablet Take 2.5 mg by mouth nightly. Needs 5mg on Friday  docusate sodium (COLACE) 50 mg capsule Take 50 mg by mouth daily.  furosemide (LASIX) 40 mg tablet Take 1 Tab by mouth daily.  traZODone (DESYREL) 50 mg tablet Take 0.5-1 Tabs by mouth nightly. (Patient taking differently: Take 25-50 mg by mouth as needed.)  multivit-minerals/folic acid (ADULT ONE DAILY MULTIVITAMIN PO) Take 1 Tab by mouth daily.  ascorbic acid, vitamin C, (VITAMIN C) 500 mg tablet Take 500 mg by mouth daily.  ferrous gluconate 324 mg (38 mg iron) tablet TAKE 1 TAB BY MOUTH DAILY (BEFORE BREAKFAST). INDICATIONS: IRON DEFICIENCY ANEMIA (Patient taking differently: Take 325 mg by mouth two (2) times daily (with meals). )  fluticasone (FLONASE) 50 mcg/actuation nasal spray 2 Sprays by Both Nostrils route daily as needed. (Patient taking differently: 2 Sprays by Both Nostrils route daily as needed for Allergies.)  loratadine (CLARITIN) 10 mg tablet TAKE 1 TAB BY MOUTH DAILY. (Patient taking differently: TAKE 1 TAB BY MOUTH DAILY PRN)  albuterol (PROVENTIL VENTOLIN) 2.5 mg /3 mL (0.083 %) nebulizer solution 3 mL by Nebulization route every four (4) hours as needed for Wheezing.  albuterol (VENTOLIN HFA) 90 mcg/actuation inhaler 2 puffs qid prn (Patient taking differently: Take 2 Puffs by inhalation every six (6) hours as needed for Wheezing or Shortness of Breath.)  acetaminophen (TYLENOL) 325 mg tablet Take 650 mg by mouth every four (4) hours as needed for Pain.  OXYGEN-AIR DELIVERY SYSTEMS 3 L by Nasal route continuous. No current facility-administered medications for this encounter. ALLERGIES: Sulfa (sulfonamide antibiotics) and Aspirin Social History Socioeconomic History  Marital status:  Spouse name: Not on file  Number of children: Not on file  Years of education: Not on file  Highest education level: Not on file Occupational History Employer: RETIRED Comment: [employer] Tobacco Use  Smoking status: Former Smoker Packs/day: 0.25 Years: 42.00 Pack years: 10.50 Types: Cigarettes Start date: 10/1/1956 Last attempt to quit: 2014 Years since quittin.7  Smokeless tobacco: Never Used Substance and Sexual Activity  Alcohol use: No  
  Alcohol/week: 0.0 oz  Drug use: No  
Other Topics Concern  Weight Concern Yes  Special Diet Yes Social History Narrative Lives with her daughter. Jazmyne Lozano and Brenda Gama are caregivers. Ambulates only short distances. Social History Tobacco Use Smoking Status Former Smoker  Packs/day: 0.25  
 Years: 42.00  
 Pack years: 10.50  Types: Cigarettes  Start date: 10/1/1956  Last attempt to quit: 2014  Years since quittin.7 Smokeless Tobacco Never Used Family History Problem Relation Age of Onset  Heart Disease Mother CHF  Hypertension Mother  Kidney Disease Mother  Heart Disease Father CHF  Lung Disease Father  Diabetes Father  Cancer Father   
     prostate  Hypertension Father  Heart Attack Father  Heart Disease Maternal Aunt  Diabetes Brother  Coronary Artery Disease Other  Breast Cancer Neg Hx   
 
 
ROS: The patient has no difficulty with chest pain or shortness of breath. No fever or chills. Comprehensive review of systems was otherwise unremarkable except as noted above. Physical Exam:  
Visit Vitals /66 (BP 1 Location: Left arm) Pulse 72 Temp 98.3 °F (36.8 °C) Resp 20 Ht 5' 2\" (1.575 m) Wt 218 lb (98.9 kg) BMI 39.87 kg/m² Constitutional: Alert, oriented, cooperative patient in no acute distress; appears stated age;  General appearance is within normal limits for wound care patient population.  See the today's recorded vitals signs and constitutional data. Eyes: Pupils equal; Sclera are clear. EOMs intact; The eyes appear to track and move normally. The sclera are not injected. The conjunctive are clear. The eyelids are normal. There is no scleral icterus. ENMT: There are no obvious external ear, nose, lip or mouth lesions. Nares normal; Neck: Overall contour of the neck is normal with no obvious neck masses. Gross hearing is within normal limits. No obvious neck masses CV: RRR;  no JVD; No evidence of cyanosis of the upper extremities. The extremities are perfused without embolic sign, splinter hemorrhages, or petechia. Resp:  Breathing is non-labored; normal rate and effort; no audible wheezing. GI: soft and non-distended without acute abnormality noted. Musculoskeletal: unremarkable with normal function. No embolic signs or cyanosis. Neuro:  Oriented; moves all 4; no focal deficits Psychiatric: Judgement and insight are within normal limits for the wound care population of patients. Patient is oriented to person, place, and time. Recent and remote memory are within normal limits. Mood and affect are within normal limits. Integumentary (Skin/Wounds) See inspection of wound(s) below. There are no other skin areas of palpable concern. See wound center documentation including photos in the [address] Wound Template made part of this record by reference. Wounds: 
Wound Knee Anterior; Left (Active) Dressing Status  Clean, dry, and intact 1/31/2019  2:55 PM  
Dressing Type  Negative pressure wound therapy 12/6/2018  3:57 PM  
Non-Pressure Injury Full thickness (subcut/muscle) 12/13/2018  4:45 PM  
Wound Length (cm) 2.2 cm 1/31/2019  2:55 PM  
Wound Width (cm) 5.5 cm 1/31/2019  2:55 PM  
Wound Depth (cm) 0.1 1/31/2019  2:55 PM  
Wound Surface area (cm^2) 12.1 cm^2 1/31/2019  2:55 PM  
Change in Wound Size % 78.49 1/31/2019  2:55 PM  
Condition of Base Granulation 1/31/2019  2:55 PM  
 Condition of Edges Open 11/1/2018  1:43 PM  
Tissue Type Red 11/1/2018  1:43 PM  
Tissue Type Percent Red 100 1/31/2019  2:55 PM  
Drainage Amount  Moderate 1/31/2019  2:55 PM  
Drainage Color Sanguinous 1/31/2019  2:55 PM  
Wound Odor None 1/31/2019  2:55 PM  
Periwound Skin Condition Intact 1/31/2019  2:55 PM  
Cleansing and Cleansing Agents  Normal saline 1/31/2019  2:55 PM  
Procedure Tolerated Well 12/13/2018  4:45 PM  
Number of days: 105 [REMOVED] Wound Knee Left (Removed) Number of days: 29  
   
[REMOVED] Wound (Removed) Number of days: 6 [REMOVED] Wound Leg Lower Anterior; Inferior;Left;Lower (Removed) Number of days:   
  
 
 
 
 
 
 
 
Recent vitals (if inpt): 
Patient Vitals for the past 24 hrs: 
 BP Temp Pulse Resp Height Weight 01/31/19 1449 136/66 98.3 °F (36.8 °C) 72 20 5' 2\" (1.575 m) 218 lb (98.9 kg) Labs: 
No results for input(s): WBC, HGB, PLT, NA, K, CL, CO2, BUN, CREA, GLU, PTP, INR, APTT, TBIL, TBILI, CBIL, SGOT, GPT, ALT, AP, AML, LPSE, LCAD, NH4, TROPT, TROIQ, PCO2, PO2, HCO3, HGBEXT, PLTEXT, HGBEXT, PLTEXT in the last 72 hours. No lab exists for component:  PH, INREXT, INREXT Lab Results Component Value Date/Time WBC 5.5 01/15/2019 10:31 AM  
 HGB 8.3 (L) 01/15/2019 10:31 AM  
 PLATELET 279 20/46/3189 10:31 AM  
 Sodium 139 01/15/2019 10:31 AM  
 Potassium 4.4 01/15/2019 10:31 AM  
 Chloride 97 (L) 01/15/2019 10:31 AM  
 CO2 40 (H) 01/15/2019 10:31 AM  
 BUN 56 (H) 01/15/2019 10:31 AM  
 Creatinine 2.05 (H) 01/15/2019 10:31 AM  
 Glucose 184 (H) 01/15/2019 10:31 AM  
 INR 1.7 01/02/2019 06:23 AM  
 INR, External 2.4 01/23/2019 INR POC 2.4 (A) 01/23/2019 10:29 AM  
 aPTT 60.2 (H) 10/10/2018 04:02 AM  
 Bilirubin, total 0.5 01/15/2019 10:31 AM  
 AST (SGOT) 8 (L) 01/15/2019 10:31 AM  
 ALT (SGPT) 17 01/15/2019 10:31 AM  
 Alk.  phosphatase 61 01/15/2019 10:31 AM  
 Amylase 88 01/31/2012 03:12 PM  
 Lipase 157 10/08/2018 04:26 AM  
 Lactic acid 0.7 02/16/2016 06:16 AM  
 Troponin-I <0.05 01/31/2012 03:32 PM  
 Troponin-I, Qt. 0.05 12/31/2018 12:56 PM  
 
 
I reviewed recent labs and recent radiologic studies. I independently reviewed radiology images for studies I described above or studies I have ordered. Admission date (for inpatients): 1/31/2019 * No surgery found *  * No surgery found * ASSESSMENT/PLAN: 
Significant improvement since discharge from the hospital status post debridement on 10/7/2018 and VAC placement. She had PuraplyAM skin substitute graft on 11/1/2018. The VAC was maintained for 1 week without change. The wound is large and full-thickness. She was scheduled for 1 week follow-up after evaluation on 11/8. She became hospitalized and missed her follow-up appointments. She was hospitalized with respiratory issues and is on home oxygen as well as her chronic anticoagulation. She has developed some bloody drainage from the [facility]. The wound has improved dramatically with 100% granulation base. PuraplyAM #2 was placed on 12/6 and well tolerated. We discontinued the VAC since it had filled with blood. However, on 12/20 her wound had a very bad odor. The graft was somewhat soupy. The graft was intact. There appears to be an over abundance of granulation. We did not place another graft, but dressed with Hydrofera Blue ready. The wound did calm down but did not require any silver nitrate. However the dressing has come down from her knee. We will need to immobilize her knee better and wrap at higher to maintain a good dressing. This will be critical to perform any grafting procedure without stabilization with a VAC. She missed 1/3 appointment due to being hospitalized for CHF exacerbation. We obtained a knee immobilizer. We will try for the next week to see stable dressing.   I will not doing grafting procedures until we have stabilization of the wound and dressings. Unfortunately she did not tolerate the knee immobilizer. We will not pursue grafting but see if we can get this to heal from the perimeter. Granulation tissue was abundant today and I knocked it back with silver nitrate. This was well-tolerated. We will continue with [facility] ready. I will see her back in 1 week. Problem List  Date Reviewed: 1/15/2019 Codes Class Noted Severe obesity ([address] ICD-10-CM: E66.01 
ICD-9-CM: 278.01  1/15/2019 Skin defect ICD-10-CM: L98.9 ICD-9-CM: 709.8  1/10/2019 Systolic CHF, acute on chronic (HCC) ICD-10-CM: W37.65 ICD-9-CM: 428.23, 428.0  12/31/2018 Acute on chronic combined systolic and diastolic CHF (congestive heart failure) (HCC) ICD-10-CM: I50.43 ICD-9-CM: 428.43, 428.0  12/31/2018 Acute exacerbation of CHF (congestive heart failure) (HCC) ICD-10-CM: I50.9 ICD-9-CM: 428.0  11/14/2018 Debility ICD-10-CM: R53.81 ICD-9-CM: 799.3  9/8/2018 Anemia (Chronic) ICD-10-CM: D64.9 ICD-9-CM: 285.9  9/7/2018 Chronic respiratory failure with hypoxia [facility]) ICD-10-CM: J96.11 
ICD-9-CM: 518.83, 799.02  9/7/2018 Encounter for immunization ICD-10-CM: G56 ICD-9-CM: V03.89  8/21/2018 Physical debility (Chronic) ICD-10-CM: R53.81 ICD-9-CM: 799.3  5/2/2018 Closed nondisplaced fracture of shaft of fifth metacarpal bone of left hand ICD-10-CM: G44.335K ICD-9-CM: 815.03  4/28/2018 Type 2 diabetes mellitus with nephropathy (HCC) (Chronic) ICD-10-CM: E11.21 
ICD-9-CM: 250.40, 583.81  12/18/2017 S/P AVR (aortic valve replacement) (Chronic) ICD-10-CM: Z95.2 ICD-9-CM: V43.3  Unknown Overview Signed 2/23/2016 11:04 AM by Jr Bowser Mechanical/Artific Cardiomyopathy [facility] (Chronic) ICD-10-CM: I42.9 ICD-9-CM: 425.4  Unknown Osteopenia ICD-10-CM: M85.80 ICD-9-CM: 733.90  Unknown HLD (hyperlipidemia) (Chronic) ICD-10-CM: F50.5 ICD-9-CM: 272.4  Unknown Osteoarthritis ICD-10-CM: M19.90 ICD-9-CM: 715.90  Unknown  
   
 ICD (implantable cardioverter-defibrillator) in place ICD-10-CM: Z95.810 ICD-9-CM: V45.02  10/2/2014 Overview Signed 10/2/2014  4:58 PM by Jeannine Araujo Bi[employer]ik single-chamber ICD implantation 10/20/14 COPD (chronic obstructive pulmonary disease) (HCC) (Chronic) ICD-10-CM: J44.9 ICD-9-CM: 435  4/2/2014 Overview Signed 10/6/2018  8:56 PM by Juve Greene NP  
  HOME OXYGEN 3 LITERS 
  
  
   
 REJI (obstructive sleep apnea)-cpap (Chronic) ICD-10-CM: G47.33 
ICD-9-CM: 327.23  4/2/2014 CKD (chronic kidney disease) stage 3, GFR 30-59 ml/min (HCC) (Chronic) ICD-10-CM: N18.3 ICD-9-CM: 585.3  7/10/2013 Anticoagulated on Coumadin (Chronic) ICD-10-CM: Z51.81, Z79.01 
ICD-9-CM: V58.83, V58.61  7/9/2013 Overview Signed 7/9/2013  4:16 PM by Rosy Blanco NP  
  S/P AVR 
  
  
   
 CAD (coronary artery disease) (Chronic) ICD-10-CM: I25.10 ICD-9-CM: 414.00  1/20/2013 Overview Signed 5/20/2014 10:05 AM by Marilu Barnes NP  
  5/8/14 PCI LAD with stent placed Chronic combined systolic and diastolic heart failure (HCC) (Chronic) ICD-10-CM: I50.42 
ICD-9-CM: 428.42  1/20/2013 Overview Addendum 4/28/2018  4:53 AM by Reji Iqbal MD  
  5/8/14 ECHO:  EF 10-15% 12/2017:  EF 25-30% Essential hypertension, benign (Chronic) ICD-10-CM: I10 
ICD-9-CM: 401.1  1/20/2013 MDS (myelodysplastic syndrome) (HCC) (Chronic) ICD-10-CM: D46.9 ICD-9-CM: 238.75  12/17/2011 Overview Addendum 8/29/2013 11:06 AM by Larissa Connolly Procrit started in August, 2011 12/18/11 Procrit weekly and Iron stores. 5-12 12-13-12 good response to 3 weekly procrit did not need it last time 3-7-13 Pt doing well. Just wanted a \"check-up. \" Responding to Procrit every three weeks. 8-29-13 patient has missed some injections on recently restarted hemoglobin only issue , takes oral iron iron stores each time Iron deficiency anemia due to chronic blood loss (Chronic) ICD-10-CM: D50.0 ICD-9-CM: 280.0  7/29/2009 Active Problems: * No active hospital problems. * Any procedures done today in the wound center are documented in a separate note in connect care made part of this record by reference Wound Care Orders Placed This Encounter  WOUND CARE, DRESSING CHANGE Left anterior knee wound: 
Cleanse wound with normal saline. Apply Hydrofera Ready, ABD, and wrap with unna boot or coban 2. Home health to change dressing 3 times per week. Standing Status:   Standing Number of Occurrences:   1  silver nitrate applicators (ARZOL) 1 Applicator Follow-up Information Follow up With Specialties Details Why Contact Info [facility] WOUND [facility] Wound Care In 1 week  Marcial Walker [phone] Signed:  Juliano Vitale MD,  FACS

## 2019-01-31 NOTE — WOUND CARE
01/31/19 1455 Wound Knee Anterior; Left Date First Assessed/Time First Assessed: 10/18/18 1505   POA: Yes  Wound Type: Trauma  Location: Knee  Wound Description (Optional): #1  Orientation: Anterior; Left Dressing Status  Clean, dry, and intact Dressing Type  (hydrofera ready, abd, unna boot) Wound Length (cm) 2.2 cm Wound Width (cm) 5.5 cm Wound Depth (cm) 0.1 Wound Surface area (cm^2) 12.1 cm^2 Change in Wound Size % 78.49 Condition of Base Granulation Tissue Type Percent Red 100 Drainage Amount  Moderate Drainage Color Sanguinous Wound Odor None Periwound Skin Condition Intact Cleansing and Cleansing Agents  Normal saline Patient is currently taking coumadin.

## 2019-02-01 ENCOUNTER — HOME CARE VISIT (OUTPATIENT)
Dept: SCHEDULING | Facility: HOME HEALTH | Age: 69
End: 2019-02-01
Payer: MEDICARE

## 2019-02-01 PROCEDURE — G0299 HHS/HOSPICE OF RN EA 15 MIN: HCPCS

## 2019-02-01 PROCEDURE — 3331090001 HH PPS REVENUE CREDIT

## 2019-02-01 PROCEDURE — A6454 SELF-ADHER BAND W>=3" <5"/YD: HCPCS

## 2019-02-01 PROCEDURE — A6210 FOAM DRG >16<=48 SQ IN W/O B: HCPCS

## 2019-02-01 PROCEDURE — 3331090002 HH PPS REVENUE DEBIT

## 2019-02-02 PROCEDURE — 3331090001 HH PPS REVENUE CREDIT

## 2019-02-02 PROCEDURE — 3331090002 HH PPS REVENUE DEBIT

## 2019-02-03 PROCEDURE — 3331090001 HH PPS REVENUE CREDIT

## 2019-02-03 PROCEDURE — 3331090002 HH PPS REVENUE DEBIT

## 2019-02-04 ENCOUNTER — HOME CARE VISIT (OUTPATIENT)
Dept: SCHEDULING | Facility: HOME HEALTH | Age: 69
End: 2019-02-04
Payer: MEDICARE

## 2019-02-04 VITALS
HEART RATE: 60 BPM | TEMPERATURE: 98 F | SYSTOLIC BLOOD PRESSURE: 120 MMHG | DIASTOLIC BLOOD PRESSURE: 80 MMHG | OXYGEN SATURATION: 97 % | RESPIRATION RATE: 20 BRPM

## 2019-02-04 PROCEDURE — 3331090001 HH PPS REVENUE CREDIT

## 2019-02-04 PROCEDURE — 3331090002 HH PPS REVENUE DEBIT

## 2019-02-04 PROCEDURE — G0299 HHS/HOSPICE OF RN EA 15 MIN: HCPCS

## 2019-02-05 ENCOUNTER — PATIENT OUTREACH (OUTPATIENT)
Dept: RESPIRATORY THERAPY | Age: 69
End: 2019-02-05

## 2019-02-05 ENCOUNTER — HOSPITAL ENCOUNTER (OUTPATIENT)
Dept: INFUSION THERAPY | Age: 69
Discharge: HOME | End: 2019-02-05
Payer: MEDICARE

## 2019-02-05 VITALS
HEART RATE: 71 BPM | TEMPERATURE: 98.1 F | RESPIRATION RATE: 18 BRPM | SYSTOLIC BLOOD PRESSURE: 107 MMHG | OXYGEN SATURATION: 98 % | WEIGHT: 221 LBS | DIASTOLIC BLOOD PRESSURE: 42 MMHG | BODY MASS INDEX: 40.42 KG/M2

## 2019-02-05 DIAGNOSIS — D50.0 IRON DEFICIENCY ANEMIA DUE TO CHRONIC BLOOD LOSS: ICD-10-CM

## 2019-02-05 DIAGNOSIS — N18.30 CKD (CHRONIC KIDNEY DISEASE) STAGE 3, GFR 30-59 ML/MIN (HCC): ICD-10-CM

## 2019-02-05 DIAGNOSIS — D46.9 MDS (MYELODYSPLASTIC SYNDROME) (HCC): Primary | ICD-10-CM

## 2019-02-05 DIAGNOSIS — Z95.2 S/P AVR (AORTIC VALVE REPLACEMENT): ICD-10-CM

## 2019-02-05 LAB — HGB BLD-MCNC: 8.2 G/DL (ref 11.7–15.4)

## 2019-02-05 PROCEDURE — 3331090001 HH PPS REVENUE CREDIT

## 2019-02-05 PROCEDURE — 85018 HEMOGLOBIN: CPT

## 2019-02-05 PROCEDURE — 74011250636 HC RX REV CODE- 250/636: Performed by: INTERNAL MEDICINE

## 2019-02-05 PROCEDURE — 36415 COLL VENOUS BLD VENIPUNCTURE: CPT

## 2019-02-05 PROCEDURE — 96372 THER/PROPH/DIAG INJ SC/IM: CPT

## 2019-02-05 PROCEDURE — 3331090002 HH PPS REVENUE DEBIT

## 2019-02-05 RX ADMIN — EPOETIN ALFA-EPBX 60000 UNITS: 40000 INJECTION, SOLUTION INTRAVENOUS; SUBCUTANEOUS at 13:50

## 2019-02-05 NOTE — PROGRESS NOTES
Arrived to the [facility]. Retacrit 60,000 units given as ordered. Patient tolerated well Any issues or concerns during appointment: No 
Patient aware of next infusion appointment on Tuesday,February 26th @ 1400 Discharged home via wheelchair

## 2019-02-06 ENCOUNTER — HOME CARE VISIT (OUTPATIENT)
Dept: HOME HEALTH SERVICES | Facility: HOME HEALTH | Age: 69
End: 2019-02-06
Payer: MEDICARE

## 2019-02-06 PROCEDURE — G0299 HHS/HOSPICE OF RN EA 15 MIN: HCPCS

## 2019-02-06 PROCEDURE — 3331090002 HH PPS REVENUE DEBIT

## 2019-02-06 PROCEDURE — 3331090001 HH PPS REVENUE CREDIT

## 2019-02-07 ENCOUNTER — HOSPITAL ENCOUNTER (OUTPATIENT)
Dept: WOUND CARE | Age: 69
Discharge: HOME | End: 2019-02-07
Attending: SURGERY
Payer: MEDICARE

## 2019-02-07 VITALS
TEMPERATURE: 99 F | OXYGEN SATURATION: 95 % | BODY MASS INDEX: 40.23 KG/M2 | HEIGHT: 62 IN | HEART RATE: 73 BPM | SYSTOLIC BLOOD PRESSURE: 123 MMHG | WEIGHT: 218.6 LBS | RESPIRATION RATE: 18 BRPM | DIASTOLIC BLOOD PRESSURE: 77 MMHG

## 2019-02-07 VITALS
WEIGHT: 219 LBS | TEMPERATURE: 98.7 F | OXYGEN SATURATION: 96 % | HEART RATE: 63 BPM | BODY MASS INDEX: 40.06 KG/M2 | RESPIRATION RATE: 16 BRPM | SYSTOLIC BLOOD PRESSURE: 132 MMHG | DIASTOLIC BLOOD PRESSURE: 78 MMHG

## 2019-02-07 DIAGNOSIS — L98.9 SKIN DEFECT: ICD-10-CM

## 2019-02-07 DIAGNOSIS — L92.9 EXCESSIVE GRANULATION TISSUE: ICD-10-CM

## 2019-02-07 DIAGNOSIS — R53.81 DEBILITY: ICD-10-CM

## 2019-02-07 DIAGNOSIS — E11.21 TYPE 2 DIABETES MELLITUS WITH NEPHROPATHY (HCC): Chronic | ICD-10-CM

## 2019-02-07 PROCEDURE — 29581 APPL MULTLAYER CMPRN SYS LEG: CPT

## 2019-02-07 PROCEDURE — 74011000250 HC RX REV CODE- 250: Performed by: SURGERY

## 2019-02-07 PROCEDURE — 3331090001 HH PPS REVENUE CREDIT

## 2019-02-07 PROCEDURE — 3331090002 HH PPS REVENUE DEBIT

## 2019-02-07 PROCEDURE — 99214 OFFICE O/P EST MOD 30 MIN: CPT | Performed by: SURGERY

## 2019-02-07 PROCEDURE — 29580 STRAPPING UNNA BOOT: CPT

## 2019-02-07 RX ORDER — SILVER NITRATE 38.21; 12.74 MG/1; MG/1
1 STICK TOPICAL ONCE
Status: COMPLETED | OUTPATIENT
Start: 2019-02-07 | End: 2019-02-07

## 2019-02-07 RX ADMIN — SILVER NITRATE APPLICATORS 1 APPLICATOR: 25; 75 STICK TOPICAL at 15:46

## 2019-02-07 NOTE — WOUND CARE
[address] [place] [address] [address] Phone: [phone] Fax: [phone] Patient: Colleen Resendiz MRN: [id number]  SSN: [id number] YOB: 1950  Age: 77 y.o. Sex: female Return Appointment: 2 weeks with Monik Montano MD 
 
Instructions:  
 
Left anterior knee wound: 
Cleanse wound with normal saline. Apply Hydrofera Ready, ABD, and wrap with unna boot or coban 2. Home health to change dressing 3 times per week. Should you experience increased redness, swelling, pain, foul odor, size of wound(s), or have a temperature over 101 degrees please contact the [address] [place] [address] Road at [phone] or if after hours contact your primary care physician or go to the hospital emergency department. Signed By: Elana Emery RN February 7, 2019

## 2019-02-07 NOTE — PROGRESS NOTES
Home Visit # Health  arrived at residence to find the patient alert and oriented per the patients norms, in no acute distress and without complaint. Patient states there is no increase in shortness of breath. She states that leg wound is healing well. HC to follow with in home visit next week. Lung Sounds:Clear and equal bilaterally. Weight: 219 LB Edema:No notable edema to bilateral lower extremities and the abdomen is soft and non-tender. B/P 132/78 LA Sitting Follow Up Appointments: Regular wound care appointment on Thursday morning. Transportation: Aide Medications: All medicines are on hand and the patient remains compliant. Education:Reviewed via teach back the CHF action plan to include low sodium diet and fluid restrictions and early interventions. Patient reports greater compliance with diet. DME: Wearing Trilogy for sleep and 02 at 3 lpm via NC continuous Safety Concerns: None Patient/Family Concerns:None Tasks:None Physical Assessment completed and noted on CHF assessment Form. Help line discussed. Will follow up with pt in one week via home visit. End of Visit. [facility]

## 2019-02-07 NOTE — WOUND CARE
02/07/19 1433 Wound Knee Anterior; Left Date First Assessed/Time First Assessed: 10/18/18 1505   POA: Yes  Wound Type: Trauma  Location: Knee  Wound Description (Optional): #1  Orientation: Anterior; Left Dressing Status  Clean, dry, and intact Dressing Type  (hydrofera ready, abd, calamine wrap/coban) Wound Length (cm) 2 cm Wound Width (cm) 5 cm Wound Depth (cm) 0.1 Wound Surface area (cm^2) 10 cm^2 Change in Wound Size % 82.23 Condition of Base Granulation Tissue Type Red Tissue Type Percent Red 100 Drainage Amount  Moderate Drainage Color Sanguinous Wound Odor None Periwound Skin Condition Intact Cleansing and Cleansing Agents  Normal saline Patient is currently taking coumadin.

## 2019-02-08 ENCOUNTER — HOME CARE VISIT (OUTPATIENT)
Dept: HOME HEALTH SERVICES | Facility: HOME HEALTH | Age: 69
End: 2019-02-08
Payer: MEDICARE

## 2019-02-08 PROCEDURE — G0299 HHS/HOSPICE OF RN EA 15 MIN: HCPCS

## 2019-02-08 PROCEDURE — 3331090002 HH PPS REVENUE DEBIT

## 2019-02-08 PROCEDURE — 3331090001 HH PPS REVENUE CREDIT

## 2019-02-09 PROCEDURE — 3331090001 HH PPS REVENUE CREDIT

## 2019-02-09 PROCEDURE — 3331090002 HH PPS REVENUE DEBIT

## 2019-02-10 PROCEDURE — 3331090001 HH PPS REVENUE CREDIT

## 2019-02-10 PROCEDURE — 3331090002 HH PPS REVENUE DEBIT

## 2019-02-11 ENCOUNTER — HOME CARE VISIT (OUTPATIENT)
Dept: SCHEDULING | Facility: HOME HEALTH | Age: 69
End: 2019-02-11
Payer: MEDICARE

## 2019-02-11 VITALS
HEART RATE: 76 BPM | RESPIRATION RATE: 17 BRPM | OXYGEN SATURATION: 98 % | TEMPERATURE: 98 F | SYSTOLIC BLOOD PRESSURE: 125 MMHG | DIASTOLIC BLOOD PRESSURE: 70 MMHG

## 2019-02-11 PROCEDURE — 3331090001 HH PPS REVENUE CREDIT

## 2019-02-11 PROCEDURE — G0299 HHS/HOSPICE OF RN EA 15 MIN: HCPCS

## 2019-02-11 PROCEDURE — 3331090002 HH PPS REVENUE DEBIT

## 2019-02-12 ENCOUNTER — PATIENT OUTREACH (OUTPATIENT)
Dept: RESPIRATORY THERAPY | Age: 69
End: 2019-02-12

## 2019-02-12 VITALS
BODY MASS INDEX: 39.32 KG/M2 | SYSTOLIC BLOOD PRESSURE: 118 MMHG | TEMPERATURE: 98.5 F | RESPIRATION RATE: 16 BRPM | OXYGEN SATURATION: 98 % | HEART RATE: 68 BPM | DIASTOLIC BLOOD PRESSURE: 68 MMHG | WEIGHT: 215 LBS

## 2019-02-12 PROCEDURE — 3331090001 HH PPS REVENUE CREDIT

## 2019-02-12 PROCEDURE — 3331090002 HH PPS REVENUE DEBIT

## 2019-02-12 NOTE — PROGRESS NOTES
Home Visit # 10- Final Home Visit Health  arrived at residence to find the patient alert and oriented per the patients norms, in no acute distress and without complaint. Patient reports that leg wound continues to heal well and she sleeps well with Trilogy. Health  will follow this patient going forward via chart review. CHF Assessment- Shortness of Breath  2 Edema   0 Abdomen  0 Stress   3 Daily activities  2 Sleeping  1 Energy    3 Weight trends  0 
TOTAL=   11 
 
Lung Sounds:Clear and equal bilaterally. Weight: 215 lb Edema: No notable edema to bilateral lower extremities and the abdomen is soft and non-tender. B/P 118/68  LA Sitting Follow Up Appointments: 
2/14  0830  [facility] 2/14  1400   Podiatrist 
 
Transportation: Aid Medications: All medicines are on hand and the patient remains compliant. Education:Reviewed via teach back the CHF action plan to include low sodium diet and fluid restrictions, early interventions and continued access to Help-line. DME: Trilogy and 02 concentrator both working well and the patient is compliant. Safety Concerns: None Patient/Family Concerns: None Tasks: None Physical Assessment completed and noted on CHF assessment Form. Help line discussed. Will follow up with pt via chart review. End of Visit. [facility]

## 2019-02-13 ENCOUNTER — HOME CARE VISIT (OUTPATIENT)
Dept: SCHEDULING | Facility: HOME HEALTH | Age: 69
End: 2019-02-13
Payer: MEDICARE

## 2019-02-13 PROCEDURE — 3331090002 HH PPS REVENUE DEBIT

## 2019-02-13 PROCEDURE — 3331090001 HH PPS REVENUE CREDIT

## 2019-02-14 PROCEDURE — 3331090001 HH PPS REVENUE CREDIT

## 2019-02-14 PROCEDURE — 3331090002 HH PPS REVENUE DEBIT

## 2019-02-15 ENCOUNTER — HOME CARE VISIT (OUTPATIENT)
Dept: SCHEDULING | Facility: HOME HEALTH | Age: 69
End: 2019-02-15
Payer: MEDICARE

## 2019-02-15 PROCEDURE — 3331090002 HH PPS REVENUE DEBIT

## 2019-02-15 PROCEDURE — 3331090001 HH PPS REVENUE CREDIT

## 2019-02-15 PROCEDURE — G0299 HHS/HOSPICE OF RN EA 15 MIN: HCPCS

## 2019-02-16 VITALS
SYSTOLIC BLOOD PRESSURE: 132 MMHG | TEMPERATURE: 98.1 F | OXYGEN SATURATION: 97 % | HEART RATE: 70 BPM | RESPIRATION RATE: 20 BRPM | DIASTOLIC BLOOD PRESSURE: 70 MMHG

## 2019-02-16 PROCEDURE — 3331090002 HH PPS REVENUE DEBIT

## 2019-02-16 PROCEDURE — 3331090001 HH PPS REVENUE CREDIT

## 2019-02-17 PROCEDURE — 3331090001 HH PPS REVENUE CREDIT

## 2019-02-17 PROCEDURE — 3331090002 HH PPS REVENUE DEBIT

## 2019-02-18 ENCOUNTER — HOME CARE VISIT (OUTPATIENT)
Dept: SCHEDULING | Facility: HOME HEALTH | Age: 69
End: 2019-02-18
Payer: MEDICARE

## 2019-02-18 PROCEDURE — 3331090002 HH PPS REVENUE DEBIT

## 2019-02-18 PROCEDURE — 3331090001 HH PPS REVENUE CREDIT

## 2019-02-18 PROCEDURE — G0299 HHS/HOSPICE OF RN EA 15 MIN: HCPCS

## 2019-02-19 VITALS
HEART RATE: 84 BPM | RESPIRATION RATE: 18 BRPM | DIASTOLIC BLOOD PRESSURE: 74 MMHG | SYSTOLIC BLOOD PRESSURE: 128 MMHG | OXYGEN SATURATION: 97 % | TEMPERATURE: 97.8 F

## 2019-02-19 PROCEDURE — 3331090001 HH PPS REVENUE CREDIT

## 2019-02-19 PROCEDURE — 3331090002 HH PPS REVENUE DEBIT

## 2019-02-20 ENCOUNTER — HOME CARE VISIT (OUTPATIENT)
Dept: SCHEDULING | Facility: HOME HEALTH | Age: 69
End: 2019-02-20
Payer: MEDICARE

## 2019-02-20 VITALS
HEART RATE: 68 BPM | RESPIRATION RATE: 17 BRPM | SYSTOLIC BLOOD PRESSURE: 121 MMHG | TEMPERATURE: 97.9 F | DIASTOLIC BLOOD PRESSURE: 68 MMHG | OXYGEN SATURATION: 97 %

## 2019-02-20 LAB
INR BLD: 2.3 (ref 0.9–1.1)
PT POC: 27.4 SECONDS (ref 11.8–14.9)

## 2019-02-20 PROCEDURE — 3331090001 HH PPS REVENUE CREDIT

## 2019-02-20 PROCEDURE — G0299 HHS/HOSPICE OF RN EA 15 MIN: HCPCS

## 2019-02-20 PROCEDURE — 3331090002 HH PPS REVENUE DEBIT

## 2019-02-21 ENCOUNTER — HOSPITAL ENCOUNTER (OUTPATIENT)
Dept: WOUND CARE | Age: 69
Discharge: HOME | End: 2019-02-21
Attending: SURGERY
Payer: MEDICARE

## 2019-02-21 VITALS
TEMPERATURE: 98.6 F | BODY MASS INDEX: 38.46 KG/M2 | SYSTOLIC BLOOD PRESSURE: 120 MMHG | RESPIRATION RATE: 20 BRPM | HEART RATE: 76 BPM | HEIGHT: 62 IN | WEIGHT: 209 LBS | DIASTOLIC BLOOD PRESSURE: 70 MMHG | OXYGEN SATURATION: 93 %

## 2019-02-21 VITALS
RESPIRATION RATE: 18 BRPM | HEART RATE: 70 BPM | HEART RATE: 69 BPM | SYSTOLIC BLOOD PRESSURE: 134 MMHG | OXYGEN SATURATION: 97 % | RESPIRATION RATE: 18 BRPM | DIASTOLIC BLOOD PRESSURE: 72 MMHG | SYSTOLIC BLOOD PRESSURE: 127 MMHG | OXYGEN SATURATION: 97 % | TEMPERATURE: 97.6 F | DIASTOLIC BLOOD PRESSURE: 72 MMHG | TEMPERATURE: 97.9 F

## 2019-02-21 PROCEDURE — 29580 STRAPPING UNNA BOOT: CPT

## 2019-02-21 PROCEDURE — 77030030167 HC BNDG UNNA BOOT TELE -A

## 2019-02-21 PROCEDURE — 3331090002 HH PPS REVENUE DEBIT

## 2019-02-21 PROCEDURE — 3331090001 HH PPS REVENUE CREDIT

## 2019-02-21 PROCEDURE — 77030035128 HC DRSG ANTIMIC FOAM HYDROFERA HOLL -A

## 2019-02-21 NOTE — PROGRESS NOTES
Wound Center Progress Note Patient: Kamila Harmon MRN: [id number]  SSN: [id number] YOB: 1950  Age: 77 y.o. Sex: female Subjective: Chief Complaint: 
Kamila Harmon is a 77 y.o. BLACK OR  female who presents with L lower leg anterior wound of 5 weeks duration. much improved and about 50% size reduction since her last visit. History of Present Illness:    
 
Wound Caused By: acute injury due to following on her knee. Past Medical History:  
Diagnosis Date  Anticoagulated on Coumadin 7/9/2013 S/P AVR  CAD (coronary artery disease) 1/20/2013 5/8/14 PCI LAD with stent placed  Cardiomyopathy ([address]  CHF (congestive heart failure) ([facility] [address] [facility] 2/17/2017  CKD (chronic kidney disease) stage 3, GFR 30-59 ml/min ([facility]) 7/10/2013  COPD (chronic obstructive pulmonary disease) ([address] 4/2/2014  Diabetes ([address]  Essential hypertension, benign 1/20/2013  HLD (hyperlipidemia)  ICD (implantable cardioverter-defibrillator) in place 10/2/2014 Biotronik single-chamber ICD implantation 10/20/14  Iron deficiency anemia due to chronic blood loss 7/29/2009  MDS (myelodysplastic syndrome) ([address] 12/17/2011 Procrit started in August, 2011 12/18/11 Procrit weekly and Iron stores. 5-12 12-13-12 good response to 3 weekly procrit did not need it last time  3-7-13 Pt doing well. Just wanted a \"check-up. \" Responding to Procrit every three weeks. 8-29-13 patient has missed some injections on recently restarted hemoglobin only issue , takes oral iron iron stores each time  REJI (obstructive sleep apnea)-cpap 4/2/2014  Osteoarthritis  Osteopenia  Pulmonary hypertension ([address] 6/15/2016  Quadrantanopia  Skin defect 11/1/2018  Visual complaint 12/14/2015 Past Surgical History:  
Procedure Laterality Date [address]  2009 8 [facility], [facility] mechanical valve   HX  SECTION    
 HX CORONARY STENT PLACEMENT  May, 2014 STEMI with Stent placement.  HX ENDOSCOPY  2009 EGD [address] CATHETERIZATION    HX PACEMAKER  10/2/2014 Bi[employer]ron[employer] ICD  HX TUBAL LIGATION    
 IR BX BONE MARROW DIAGNOSTIC  2011 Family History Problem Relation Age of Onset  Heart Disease Mother CHF  Hypertension Mother  Kidney Disease Mother  Heart Disease Father CHF  Lung Disease Father  Diabetes Father  Cancer Father   
     prostate  Hypertension Father  Heart Attack Father  Heart Disease Maternal Aunt  Diabetes Brother  Coronary Artery Disease Other  Breast Cancer Neg Hx Social History Tobacco Use  Smoking status: Former Smoker Packs/day: 0.25 Years: 42.00 Pack years: 10.50 Types: Cigarettes Start date: 10/1/1956 Last attempt to quit: 2014 Years since quittin.8  Smokeless tobacco: Never Used Substance Use Topics  Alcohol use: No  
  Alcohol/week: 0.0 oz  
   
Prior to Admission medications Medication Sig Start Date End Date Taking? Authorizing Provider  
carvedilol (COREG) 6.25 mg tablet Take 1 Tab by mouth two (2) times daily (with meals). 19   Karlos GOULD MD  
traMADol Adelbert Poot) 50 mg tablet Take 1 Tab by mouth every six (6) hours as needed for Pain. Max Daily Amount: 200 mg. 18   Shakila Guthrie MD  
cholecalciferol, vitamin D3, (VITAMIN D3) 2,000 unit tab Take 2,000 Units by mouth daily. Provider, Historical  
warfarin (COUMADIN) 5 mg tablet Take 5 mg by mouth every Monday, Wednesday, Friday. Take as directed: 
take one 5mg tablet on Monday, Wednesday, Friday 
take 2.5mg (half of 5mg tablet) on all other days , Thurs, Sat, Sun    Provider, Historical  
spironolactone (ALDACTONE) 25 mg tablet Take 25 mg by mouth daily.     Provider, Historical  
 nitroglycerin (NITROSTAT) 0.4 mg SL tablet 1 Tab by SubLINGual route every five (5) minutes as needed for Chest Pain. 12/18/18   Fatimah Fairbanks MD  
sertraline (ZOLOFT) 50 mg tablet Take 1 Tab by mouth daily. 12/18/18   Fatimah Fairbanks MD  
simvastatin (ZOCOR) 20 mg tablet Take 1 Tab by mouth nightly. 12/12/18   Fatimah Fairbanks MD  
sacubitril-valsartan (ENTRESTO) 24 mg/26 mg tablet Take 1 Tab by mouth two (2) times a day. 11/26/18   Jolene Becker MD  
fluticasone-vilanterol (BREO ELLIPTA) 037-08 mcg/dose inhaler Take 1 Puff by inhalation daily. 11/1/18   Luis Montanez NP  
OTHER Trilogy    Provider, Historical  
mometasone-formoterol (DULERA) 200-5 mcg/actuation HFA inhaler 2 puffs bid, rinse mouth after use. Patient taking differently: Take 2 Puffs by inhalation two (2) times daily as needed. 10/24/18   Luis Montanez NP  
isosorbide mononitrate ER (IMDUR) 30 mg tablet Take 30 mg by mouth daily. Provider, Historical  
aspirin delayed-release 81 mg tablet Take 81 mg by mouth daily. Provider, Historical  
warfarin (COUMADIN) 2.5 mg tablet Take 2.5 mg by mouth nightly. Needs 5mg on Friday    Provider, Historical  
docusate sodium (COLACE) 50 mg capsule Take 50 mg by mouth daily. Provider, Historical  
furosemide (LASIX) 40 mg tablet Take 1 Tab by mouth daily. 9/20/18   Felisha Lund MD  
traZODone (DESYREL) 50 mg tablet Take 0.5-1 Tabs by mouth nightly. Patient taking differently: Take 25-50 mg by mouth as needed. 9/20/18   Felisha Lund MD  
multivit-minerals/folic acid (ADULT ONE DAILY MULTIVITAMIN PO) Take 1 Tab by mouth daily. Provider, Historical  
ascorbic acid, vitamin C, (VITAMIN C) 500 mg tablet Take 500 mg by mouth daily. Provider, Historical  
ferrous gluconate 324 mg (38 mg iron) tablet TAKE 1 TAB BY MOUTH DAILY (BEFORE BREAKFAST). INDICATIONS: IRON DEFICIENCY ANEMIA Patient taking differently: Take 325 mg by mouth two (2) times daily (with meals). 4/5/18   Winter Salomon MD  
fluticasone (FLONASE) 50 mcg/actuation nasal spray 2 Sprays by Both Nostrils route daily as needed. Patient taking differently: 2 Sprays by Both Nostrils route daily as needed for Allergies. 3/7/18   Winter Salomon MD  
loratadine (CLARITIN) 10 mg tablet TAKE 1 TAB BY MOUTH DAILY. Patient taking differently: TAKE 1 TAB BY MOUTH DAILY PRN 1/2/18   CAROLINA Lr  
albuterol (PROVENTIL VENTOLIN) 2.5 mg /3 mL (0.083 %) nebulizer solution 3 mL by Nebulization route every four (4) hours as needed for Wheezing. 8/30/17   Dolores Rueda NP  
albuterol (VENTOLIN HFA) 90 mcg/actuation inhaler 2 puffs qid prn Patient taking differently: Take 2 Puffs by inhalation every six (6) hours as needed for Wheezing or Shortness of Breath. 8/30/17   Dolores Rueda NP  
acetaminophen (TYLENOL) 325 mg tablet Take 650 mg by mouth every four (4) hours as needed for Pain. Provider, Historical  
OXYGEN-AIR DELIVERY SYSTEMS 3 L by Nasal route continuous. Provider, Historical  
 
Allergies Allergen Reactions  Sulfa (Sulfonamide Antibiotics) Hives  Aspirin Nausea Only Cannot take uncoated aspirin Review of Systems: A comprehensive review of systems was negative except for that written in the History of Present Illness. Lab Results Component Value Date/Time Hemoglobin A1c 6.1 (H) 11/19/2018 06:28 AM  
 Hemoglobin A1c, External 6.4 06/09/2015 Immunization History Administered Date(s) Administered  Influenza High Dose Vaccine PF 10/01/2016, 09/01/2017, 10/17/2018  Influenza Vaccine 01/01/2013, 10/01/2014, 09/01/2015  Pneumococcal Conjugate (PCV-13) 07/17/2015  Pneumococcal Vaccine (Unspecified Type) 01/01/2013  TB Skin Test (PPD) Intradermal 05/26/2014, 04/29/2018, 09/09/2018, 10/06/2018, 11/14/2018 Body mass index is 38.23 kg/m². Counseling regarding nutrition done: No  
 
Current medications: Current Outpatient Medications Medication Sig Dispense Refill  carvedilol (COREG) 6.25 mg tablet Take 1 Tab by mouth two (2) times daily (with meals). 60 Tab 3  
 traMADol (ULTRAM) 50 mg tablet Take 1 Tab by mouth every six (6) hours as needed for Pain. Max Daily Amount: 200 mg. 180 Tab 3  cholecalciferol, vitamin D3, (VITAMIN D3) 2,000 unit tab Take 2,000 Units by mouth daily.  warfarin (COUMADIN) 5 mg tablet Take 5 mg by mouth every Monday, Wednesday, Friday. Take as directed: 
take one 5mg tablet on Monday, Wednesday, Friday 
take 2.5mg (half of 5mg tablet) on all other days Tu, Thurs, Sat, Sun    
 spironolactone (ALDACTONE) 25 mg tablet Take 25 mg by mouth daily.  nitroglycerin (NITROSTAT) 0.4 mg SL tablet 1 Tab by SubLINGual route every five (5) minutes as needed for Chest Pain. 1 Bottle 5  
 sertraline (ZOLOFT) 50 mg tablet Take 1 Tab by mouth daily. 30 Tab 4  
 simvastatin (ZOCOR) 20 mg tablet Take 1 Tab by mouth nightly. 90 Tab 3  
 sacubitril-valsartan (ENTRESTO) 24 mg/26 mg tablet Take 1 Tab by mouth two (2) times a day. 180 Tab 3  
 fluticasone-vilanterol (BREO ELLIPTA) 200-25 mcg/dose inhaler Take 1 Puff by inhalation daily. 1 Inhaler 11  
 OTHER Trilogy  mometasone-formoterol (DULERA) 200-5 mcg/actuation HFA inhaler 2 puffs bid, rinse mouth after use. (Patient taking differently: Take 2 Puffs by inhalation two (2) times daily as needed.) 3 Inhaler 3  
 isosorbide mononitrate ER (IMDUR) 30 mg tablet Take 30 mg by mouth daily.  aspirin delayed-release 81 mg tablet Take 81 mg by mouth daily.  warfarin (COUMADIN) 2.5 mg tablet Take 2.5 mg by mouth nightly. Needs 5mg on Friday  docusate sodium (COLACE) 50 mg capsule Take 50 mg by mouth daily.  furosemide (LASIX) 40 mg tablet Take 1 Tab by mouth daily. 30 Tab 6  
 traZODone (DESYREL) 50 mg tablet Take 0.5-1 Tabs by mouth nightly.  (Patient taking differently: Take 25-50 mg by mouth as needed.) 60 Tab 2  
  multivit-minerals/folic acid (ADULT ONE DAILY MULTIVITAMIN PO) Take 1 Tab by mouth daily.  ascorbic acid, vitamin C, (VITAMIN C) 500 mg tablet Take 500 mg by mouth daily.  ferrous gluconate 324 mg (38 mg iron) tablet TAKE 1 TAB BY MOUTH DAILY (BEFORE BREAKFAST). INDICATIONS: IRON DEFICIENCY ANEMIA (Patient taking differently: Take 325 mg by mouth two (2) times daily (with meals). ) 90 Tab 3  
 fluticasone (FLONASE) 50 mcg/actuation nasal spray 2 Sprays by Both Nostrils route daily as needed. (Patient taking differently: 2 Sprays by Both Nostrils route daily as needed for Allergies. ) 3 Bottle 3  
 loratadine (CLARITIN) 10 mg tablet TAKE 1 TAB BY MOUTH DAILY. (Patient taking differently: TAKE 1 TAB BY MOUTH DAILY PRN) 30 Tab 1  
 albuterol (PROVENTIL VENTOLIN) 2.5 mg /3 mL (0.083 %) nebulizer solution 3 mL by Nebulization route every four (4) hours as needed for Wheezing. 120 Each 11  
 albuterol (VENTOLIN HFA) 90 mcg/actuation inhaler 2 puffs qid prn (Patient taking differently: Take 2 Puffs by inhalation every six (6) hours as needed for Wheezing or Shortness of Breath.) 1 Inhaler 11  
 acetaminophen (TYLENOL) 325 mg tablet Take 650 mg by mouth every four (4) hours as needed for Pain.  OXYGEN-AIR DELIVERY SYSTEMS 3 L by Nasal route continuous. Objective:  
 
Physical Exam:  
 
Visit Vitals /70 (BP 1 Location: Left arm) Pulse 76 Temp 98.6 °F (37 °C) Resp 20 Ht 5' 2\" (1.575 m) Wt 94.8 kg (209 lb) SpO2 93% BMI 38.23 kg/m² General: well developed, well nourished, pleasant , NAD. Hygiene good Psych: cooperative. Pleasant. No anxiety or depression. Normal mood and affect. Neuro: alert and oriented to person/place/situation. Otherwise nonfocal. 
Derm: Normal turgor for age, dry skin HEENT: Normocephalic, atraumatic. EOMI. Conjunctiva clear. No scleral icterus. Neck: Normal range of motion. No masses. Chest: Good air entry bilaterally. Respirations nonlabored Cardio: Normal heart sounds,no rubs, murmurs or gallops Abdomen: Soft, nontender, nondistended, normoactive bowel sounds Lower extremities: color normal; temperature normal. Hair growth is not present. Calves are supple, nontender, approximately equally sized in comparison. Capillary refill <3 sec Ulcer Description: Wound Knee Anterior; Left (Active) Dressing Status  Clean, dry, and intact 2/21/2019  2:46 PM  
Dressing Type  Negative pressure wound therapy 12/6/2018  3:57 PM  
Non-Pressure Injury Full thickness (subcut/muscle) 12/13/2018  4:45 PM  
Wound Length (cm) 1.7 cm 2/21/2019  2:46 PM  
Wound Width (cm) 3.4 cm 2/21/2019  2:46 PM  
Wound Depth (cm) 0.1 2/21/2019  2:46 PM  
Wound Surface area (cm^2) 5.78 cm^2 2/21/2019  2:46 PM  
Change in Wound Size % 89.73 2/21/2019  2:46 PM  
Condition of Base Granulation 2/21/2019  2:46 PM  
Condition of Edges Open 11/1/2018  1:43 PM  
Tissue Type Red 2/21/2019  2:46 PM  
Tissue Type Percent Red 100 2/21/2019  2:46 PM  
Drainage Amount  Moderate 2/21/2019  2:46 PM  
Drainage Color Sanguinous 2/21/2019  2:46 PM  
Wound Odor None 2/21/2019  2:46 PM  
Periwound Skin Condition Intact 2/21/2019  2:46 PM  
Cleansing and Cleansing Agents  Normal saline 2/21/2019  2:46 PM  
Procedure Tolerated Well 12/13/2018  4:45 PM  
Number of days: 126  
   
[REMOVED] Wound Knee Left (Removed) Number of days: 29  
   
[REMOVED] Wound (Removed) Number of days: 6 [REMOVED] Wound Leg Lower Anterior; Inferior;Left;Lower (Removed) Number of days:   
   
 
Data Review: No results found for this or any previous visit (from the past 24 hour(s)). Assessment:  
 
77 y.o. female with L lower leg medial combined ulcer. Problem List  Date Reviewed: 1/15/2019 Codes Class Noted Excessive granulation tissue ICD-10-CM: L92.9 ICD-9-CM: 701.5  1/24/2019 Severe obesity ([address] ICD-10-CM: E66.01 
ICD-9-CM: 278.01  1/15/2019 Skin defect ICD-10-CM: L98.9 ICD-9-CM: 709.8  1/10/2019 Systolic CHF, acute on chronic (HCC) ICD-10-CM: X28.04 ICD-9-CM: 428.23, 428.0  12/31/2018 Acute on chronic combined systolic and diastolic CHF (congestive heart failure) (HCC) ICD-10-CM: I50.43 ICD-9-CM: 428.43, 428.0  12/31/2018 Acute exacerbation of CHF (congestive heart failure) (HCC) ICD-10-CM: I50.9 ICD-9-CM: 428.0  11/14/2018 Debility ICD-10-CM: R53.81 ICD-9-CM: 799.3  9/8/2018 Anemia (Chronic) ICD-10-CM: D64.9 ICD-9-CM: 285.9  9/7/2018 Chronic respiratory failure with hypoxia [facility]) ICD-10-CM: J96.11 
ICD-9-CM: 518.83, 799.02  9/7/2018 Encounter for immunization ICD-10-CM: Y84 ICD-9-CM: V03.89  8/21/2018 Physical debility (Chronic) ICD-10-CM: R53.81 ICD-9-CM: 799.3  5/2/2018 Closed nondisplaced fracture of shaft of fifth metacarpal bone of left hand ICD-10-CM: B50.854X ICD-9-CM: 815.03  4/28/2018 Type 2 diabetes mellitus with nephropathy (HCC) (Chronic) ICD-10-CM: E11.21 
ICD-9-CM: 250.40, 583.81  12/18/2017 S/P AVR (aortic valve replacement) (Chronic) ICD-10-CM: Z95.2 ICD-9-CM: V43.3  Unknown Overview Signed 2/23/2016 11:04 AM by Lillis Nageotte Mechanical/Artific Cardiomyopathy ([facility] (Chronic) ICD-10-CM: I42.9 ICD-9-CM: 425.4  Unknown Osteopenia ICD-10-CM: M85.80 ICD-9-CM: 733.90  Unknown HLD (hyperlipidemia) (Chronic) ICD-10-CM: H63.3 ICD-9-CM: 272.4  Unknown Osteoarthritis ICD-10-CM: M19.90 ICD-9-CM: 715.90  Unknown  
   
 ICD (implantable cardioverter-defibrillator) in place ICD-10-CM: Z95.810 ICD-9-CM: V45.02  10/2/2014 Overview Signed 10/2/2014  4:58 PM by Bart Ritchieroniblaise single-chamber ICD implantation 10/20/14 COPD (chronic obstructive pulmonary disease) (HCC) (Chronic) ICD-10-CM: J44.9 ICD-9-CM: 686  4/2/2014  Overview Signed 10/6/2018  8:56 PM by Rahul Rueda NP  
  HOME OXYGEN 3 LITERS 
  
  
   
 REJI (obstructive sleep apnea)-cpap (Chronic) ICD-10-CM: G47.33 
ICD-9-CM: 327.23  4/2/2014 CKD (chronic kidney disease) stage 3, GFR 30-59 ml/min (HCC) (Chronic) ICD-10-CM: N18.3 ICD-9-CM: 585.3  7/10/2013 Anticoagulated on Coumadin (Chronic) ICD-10-CM: Z51.81, Z79.01 
ICD-9-CM: V58.83, V58.61  7/9/2013 Overview Signed 7/9/2013  4:16 PM by Mar Primrose, NP  
  S/P AVR 
  
  
   
 CAD (coronary artery disease) (Chronic) ICD-10-CM: I25.10 ICD-9-CM: 414.00  1/20/2013 Overview Signed 5/20/2014 10:05 AM by Juice Barksdale NP  
  5/8/14 PCI LAD with stent placed Chronic combined systolic and diastolic heart failure (HCC) (Chronic) ICD-10-CM: I50.42 
ICD-9-CM: 428.42  1/20/2013 Overview Addendum 4/28/2018  4:53 AM by Autumn Pino MD  
  5/8/14 ECHO:  EF 10-15% 12/2017:  EF 25-30% Essential hypertension, benign (Chronic) ICD-10-CM: I10 
ICD-9-CM: 401.1  1/20/2013 MDS (myelodysplastic syndrome) (HCC) (Chronic) ICD-10-CM: D46.9 ICD-9-CM: 238.75  12/17/2011 Overview Addendum 8/29/2013 11:06 AM by Tabatha Pierce Procrit started in August, 2011 12/18/11 Procrit weekly and Iron stores. 5-12 12-13-12 good response to 3 weekly procrit did not need it last time 3-7-13 Pt doing well. Just wanted a \"check-up. \" Responding to Procrit every three weeks. 8-29-13 patient has missed some injections on recently restarted hemoglobin only issue , takes oral iron iron stores each time Iron deficiency anemia due to chronic blood loss (Chronic) ICD-10-CM: D50.0 ICD-9-CM: 280.0  7/29/2009 Needs: 
Good local wound care Edema management Nutrition optimization Good Diabetic control Plan:  
 
Orders Placed This Encounter  WOUND CARE, DRESSING CHANGE Return Appointment: 2 weeks with Silva Macario MD 
  
Instructions:  
  
Left anterior knee wound: 
Cleanse wound with normal saline.  Apply Hydrofera Ready, ABD, and wrap with unna boot or coban 2. Home health to change dressing 3 times per week. 
  
   
  Standing Status:   Standing Number of Occurrences:   1 Patient understood and agrees with plan. Questions answered. Follow-up Information Follow up With Specialties Details Why Contact Info [facility] Wound Care In 2 weeks  [phone] [phone] Signed By: Rachael Cuellar MD   
 February 21, 2019

## 2019-02-21 NOTE — WOUND CARE
[address] [place] [address], [facility] [address] Phone: [phone] Fax: [phone] Patient: Chris Pryor MRN: [id number]  SSN: [id number] YOB: 1950  Age: 77 y.o. Sex: female Return Appointment: 2 weeks with Talbot Litten, MD 
 
  
Instructions:  
  
Left anterior knee wound: 
Cleanse wound with normal saline. Apply Hydrofera Ready, ABD, and wrap with unna boot or coban 2. Home health to change dressing 3 times per week. 
  
  
 
 
 
Should you experience increased redness, swelling, pain, foul odor, size of wound(s), or have a temperature over 101 degrees please contact the [address] [place] [address] Road at [phone] or if after hours contact your primary care physician or go to the hospital emergency department. Signed By: Heather Ojeda RN February 21, 2019

## 2019-02-21 NOTE — WOUND CARE
02/21/19 1446 Wound Knee Anterior; Left Date First Assessed/Time First Assessed: 10/18/18 1505   POA: Yes  Wound Type: Trauma  Location: Knee  Wound Description (Optional): #1  Orientation: Anterior; Left Dressing Status  Clean, dry, and intact Dressing Type  (hydrofera ready, abd, unna boot) Wound Length (cm) 1.7 cm Wound Width (cm) 3.4 cm Wound Depth (cm) 0.1 Wound Surface area (cm^2) 5.78 cm^2 Change in Wound Size % 89.73 Condition of Base Granulation Tissue Type Red Tissue Type Percent Red 100 Drainage Amount  Moderate Drainage Color Sanguinous Wound Odor None Periwound Skin Condition Intact Cleansing and Cleansing Agents  Normal saline Patient is taking Aspirin 81 mg daily and Coumadin.

## 2019-02-22 PROCEDURE — 3331090001 HH PPS REVENUE CREDIT

## 2019-02-22 PROCEDURE — 3331090002 HH PPS REVENUE DEBIT

## 2019-02-23 PROCEDURE — 3331090001 HH PPS REVENUE CREDIT

## 2019-02-23 PROCEDURE — 3331090002 HH PPS REVENUE DEBIT

## 2019-02-24 PROCEDURE — 3331090001 HH PPS REVENUE CREDIT

## 2019-02-24 PROCEDURE — 3331090002 HH PPS REVENUE DEBIT

## 2019-02-25 PROCEDURE — 3331090002 HH PPS REVENUE DEBIT

## 2019-02-25 PROCEDURE — 3331090001 HH PPS REVENUE CREDIT

## 2019-02-26 ENCOUNTER — HOSPITAL ENCOUNTER (OUTPATIENT)
Dept: INFUSION THERAPY | Age: 69
Discharge: HOME | End: 2019-02-26
Payer: MEDICARE

## 2019-02-26 VITALS
SYSTOLIC BLOOD PRESSURE: 90 MMHG | RESPIRATION RATE: 18 BRPM | TEMPERATURE: 98 F | DIASTOLIC BLOOD PRESSURE: 47 MMHG | OXYGEN SATURATION: 92 % | HEART RATE: 80 BPM

## 2019-02-26 DIAGNOSIS — D50.0 IRON DEFICIENCY ANEMIA DUE TO CHRONIC BLOOD LOSS: ICD-10-CM

## 2019-02-26 DIAGNOSIS — D46.9 MDS (MYELODYSPLASTIC SYNDROME) (HCC): Primary | ICD-10-CM

## 2019-02-26 LAB
BASOPHILS # BLD: 0 K/UL (ref 0–0.2)
BASOPHILS NFR BLD: 1 % (ref 0–2)
DIFFERENTIAL METHOD BLD: ABNORMAL
EOSINOPHIL # BLD: 0.1 K/UL (ref 0–0.8)
EOSINOPHIL NFR BLD: 1 % (ref 0.5–7.8)
ERYTHROCYTE [DISTWIDTH] IN BLOOD BY AUTOMATED COUNT: 14.8 % (ref 11.9–14.6)
FERRITIN SERPL-MCNC: 32 NG/ML (ref 8–388)
HCT VFR BLD AUTO: 31.3 % (ref 35.8–46.3)
HGB BLD-MCNC: 9.4 G/DL (ref 11.7–15.4)
IMM GRANULOCYTES # BLD AUTO: 0 K/UL (ref 0–0.5)
IMM GRANULOCYTES NFR BLD AUTO: 0 % (ref 0–5)
IRON SATN MFR SERPL: 23 %
IRON SERPL-MCNC: 66 UG/DL (ref 35–150)
LYMPHOCYTES # BLD: 1.3 K/UL (ref 0.5–4.6)
LYMPHOCYTES NFR BLD: 18 % (ref 13–44)
MCH RBC QN AUTO: 27.6 PG (ref 26.1–32.9)
MCHC RBC AUTO-ENTMCNC: 30 G/DL (ref 31.4–35)
MCV RBC AUTO: 91.8 FL (ref 79.6–97.8)
MONOCYTES # BLD: 0.6 K/UL (ref 0.1–1.3)
MONOCYTES NFR BLD: 8 % (ref 4–12)
NEUTS SEG # BLD: 5.2 K/UL (ref 1.7–8.2)
NEUTS SEG NFR BLD: 72 % (ref 43–78)
NRBC # BLD: 0 K/UL (ref 0–0.2)
PLATELET # BLD AUTO: 154 K/UL (ref 150–450)
PMV BLD AUTO: 11.3 FL (ref 9.4–12.3)
RBC # BLD AUTO: 3.41 M/UL (ref 4.05–5.25)
TIBC SERPL-MCNC: 293 UG/DL (ref 250–450)
WBC # BLD AUTO: 7.2 K/UL (ref 4.3–11.1)

## 2019-02-26 PROCEDURE — 82728 ASSAY OF FERRITIN: CPT

## 2019-02-26 PROCEDURE — 36415 COLL VENOUS BLD VENIPUNCTURE: CPT

## 2019-02-26 PROCEDURE — 85025 COMPLETE CBC W/AUTO DIFF WBC: CPT

## 2019-02-26 PROCEDURE — 3331090001 HH PPS REVENUE CREDIT

## 2019-02-26 PROCEDURE — 83540 ASSAY OF IRON: CPT

## 2019-02-26 PROCEDURE — 74011250636 HC RX REV CODE- 250/636: Performed by: INTERNAL MEDICINE

## 2019-02-26 PROCEDURE — 96372 THER/PROPH/DIAG INJ SC/IM: CPT

## 2019-02-26 PROCEDURE — 3331090002 HH PPS REVENUE DEBIT

## 2019-02-26 RX ADMIN — EPOETIN ALFA-EPBX 60000 UNITS: 40000 INJECTION, SOLUTION INTRAVENOUS; SUBCUTANEOUS at 14:22

## 2019-02-26 NOTE — PROGRESS NOTES
Arrived to the [facility] license of [facility]. retacrit completed. Patient tolerated well. Any issues or concerns during appointment: no. 
Patient aware of next infusion appointment on 3/19 (date) at 130 (time). Discharged home.

## 2019-03-07 NOTE — WOUND CARE
[address]  [address], [address]   Phone: [phone]  Fax: [phone]    Patient: Dianah Simmonds MRN: [id number]  SSN: [id number]    YOB: 1950  Age: 68 y.o. Sex: female       Return Appointment: 2 weeks with Eddie Patel MD    Instructions:   Left anterior knee wound:  Cleanse wound with normal saline. Apply Xeroform, ABD, and wrap with unna boot or coban 2. Home health to change dressing 3 times per week. Should you experience increased redness, swelling, pain, foul odor, size of wound(s), or have a temperature over 101 degrees please contact the [address] [place] [address] Road at [phone] or if after hours contact your primary care physician or go to the hospital emergency department.     Signed By: Jes Carl RN     March 7, 2019

## 2019-03-07 NOTE — PROGRESS NOTES
WOUND CENTER Consult Note/Progress Note:   Radha Schuster  MRN: [id number]  RKO:0/9/7038  Age:69 y.o.    HPI: Radha Schuster is a 68 y.o. female who is a 77 y.o. female \"with extensive medical history as noted below on chronic coumadin who initially presented to ER 9/7 after fall in bathroom 8/28 where she landed directly on her left knee with complaints of dizziness and fatigue and found to have Hgb of 6.7.  She was transfused to Hgb of 9.6.  She also was found with a large hematoma/bullae over her left knee and INR of 4.3.  She underwent an I+D per Dr Madai Muhammad on 9/11 with a large amount of serosanguinous fluid evacuated. Margy Heck has been followed by home health PT, OT and wound care since discharge from rehab on ninth floor 9/11-9/21. Margy Heck has done well except the wound has not closed.  Two days ago, [facility] RN and daughter who is an RN noted mild erythema and heat surrounding upper half of wound.  Open area approx 5-6 cm in circumference.  No purulent drainage, oozing small amounts of dark blood with slightly foul odor.  Full ROM to knee.  All distal circulation, motion and sensation WNL for patient.  No distal edema, mild local edema.  Multiple old scars from prior lower extremity wounds.  Patient denies pain to area, fever, systemic symptoms.  She does not appear septic and lactic acid is 1.8.  WBC is 11.6 without shift.  Wound cultured in ER prior to administration of antibiotics.  Begun on vancomycin and ceftriaxone until cultures resulted. Forest Bookbinder, attentive family at bedside. Creatinine 1.48 on admission with baseline of 1.2.  Of note, patient reports diarrhea x 4 days without use of antibiotics, additional GI symptoms. \"          10/7/18: Pt sitting up on side of the bed just finishing breakfast. No complaints. AF, NAD. WBC 8.8, Pt taking Coumadin 2.5mg qhs. INR 3.0.  10/8/18: POD1 s/p left knee debridement; awake in bed, no complaints. AF, VSS. Cx growing P. Mirabilis.    10/9/18: POD2 s/p left knee debridement; awake in chair, no complaints. AF, VSS. Cx growing P. Mirabilis. Vac placed today and functioning without leak  10/10/18: POD3 s/p left knee debridement; awake in chair, no complaints. AF, VSS. Cx growing P. Mirabilis. Vac functioning without leak or bleeding      This information was obtained from the patient  The following HPI elements were documented for the patient's wound:  Location: L below knee anterior   Duration: 8/28/2018  Severity:  No pain  Context:  Fall in bathroom on coumadin, secondary infection  Modifying Factors:  Nothing makes better or worse. Improved with debridement on 10/7/2018 and VAC  Quality:  n/a  Timing:  n/a   Associated Signs and Symptoms:        Past Medical History:   Diagnosis Date    Anticoagulated on Coumadin 7/9/2013    S/P AVR     CAD (coronary artery disease) 1/20/2013 5/8/14 PCI LAD with stent placed     Cardiomyopathy ([address]     CHF (congestive heart failure) ([address] 2/17/2017    CKD (chronic kidney disease) stage 3, GFR 30-59 ml/min ([facility]) 7/10/2013    COPD (chronic obstructive pulmonary disease) ([address] 4/2/2014    Diabetes ([address]     Essential hypertension, benign 1/20/2013    HLD (hyperlipidemia)     ICD (implantable cardioverter-defibrillator) in place 10/2/2014    [employer] single-chamber ICD implantation 10/20/14     Iron deficiency anemia due to chronic blood loss 7/29/2009    MDS (myelodysplastic syndrome) ([address] 12/17/2011    Procrit started in August, 2011 12/18/11 Procrit weekly and Iron stores. 5-12 12-13-12 good response to 3 weekly procrit did not need it last time  3-7-13 Pt doing well. Just wanted a \"check-up. \" Responding to Procrit every three weeks.  8-29-13 patient has missed some injections on recently restarted hemoglobin only issue , takes oral iron iron stores each time       REJI (obstructive sleep apnea)-cpap 4/2/2014    Osteoarthritis     Osteopenia     Pulmonary hypertension ([address] 6/15/2016    Quadrantanopia     Skin defect 2018    Visual complaint 2015     Past Surgical History:   Procedure Laterality Date    CARDIAC CATHETERIZATION      HX AORTIC VALVE REPLACEMENT  ,     mechanical valve     HX  SECTION      HX CORONARY STENT PLACEMENT  May, 2014    STEMI with Stent placement.  HX ENDOSCOPY  2009    EGD    HX HEART CATHETERIZATION      HX PACEMAKER  10/2/2014    B[employer] ICD    HX TUBAL LIGATION      IR BX BONE MARROW DIAGNOSTIC  2011     Current Outpatient Medications   Medication Sig    carvedilol (COREG) 6.25 mg tablet Take 1 Tab by mouth two (2) times daily (with meals).  traMADol (ULTRAM) 50 mg tablet Take 1 Tab by mouth every six (6) hours as needed for Pain. Max Daily Amount: 200 mg.  cholecalciferol, vitamin D3, (VITAMIN D3) 2,000 unit tab Take 2,000 Units by mouth daily.  warfarin (COUMADIN) 5 mg tablet Take 5 mg by mouth every Monday, Wednesday, Friday. Take as directed:  take one 5mg tablet on Monday, Wednesday, Friday  take 2.5mg (half of 5mg tablet) on all other days , Thurs, Sat, Sun    spironolactone (ALDACTONE) 25 mg tablet Take 25 mg by mouth daily.  nitroglycerin (NITROSTAT) 0.4 mg SL tablet 1 Tab by SubLINGual route every five (5) minutes as needed for Chest Pain.  sertraline (ZOLOFT) 50 mg tablet Take 1 Tab by mouth daily.  simvastatin (ZOCOR) 20 mg tablet Take 1 Tab by mouth nightly.  sacubitril-valsartan (ENTRESTO) 24 mg/26 mg tablet Take 1 Tab by mouth two (2) times a day.  fluticasone-vilanterol (BREO ELLIPTA) 200-25 mcg/dose inhaler Take 1 Puff by inhalation daily.  OTHER Trilogy    mometasone-formoterol (DULERA) 200-5 mcg/actuation HFA inhaler 2 puffs bid, rinse mouth after use. (Patient taking differently: Take 2 Puffs by inhalation two (2) times daily as needed.)    isosorbide mononitrate ER (IMDUR) 30 mg tablet Take 30 mg by mouth daily.  aspirin delayed-release 81 mg tablet Take 81 mg by mouth daily.     warfarin (COUMADIN) 2.5 mg tablet Take 2.5 mg by mouth nightly. Needs 5mg on Friday    docusate sodium (COLACE) 50 mg capsule Take 50 mg by mouth daily.  furosemide (LASIX) 40 mg tablet Take 1 Tab by mouth daily.  traZODone (DESYREL) 50 mg tablet Take 0.5-1 Tabs by mouth nightly. (Patient taking differently: Take 25-50 mg by mouth as needed.)    multivit-minerals/folic acid (ADULT ONE DAILY MULTIVITAMIN PO) Take 1 Tab by mouth daily.  ascorbic acid, vitamin C, (VITAMIN C) 500 mg tablet Take 500 mg by mouth daily.  ferrous gluconate 324 mg (38 mg iron) tablet TAKE 1 TAB BY MOUTH DAILY (BEFORE BREAKFAST). INDICATIONS: IRON DEFICIENCY ANEMIA (Patient taking differently: Take 325 mg by mouth two (2) times daily (with meals). )    fluticasone (FLONASE) 50 mcg/actuation nasal spray 2 Sprays by Both Nostrils route daily as needed. (Patient taking differently: 2 Sprays by Both Nostrils route daily as needed for Allergies.)    loratadine (CLARITIN) 10 mg tablet TAKE 1 TAB BY MOUTH DAILY. (Patient taking differently: TAKE 1 TAB BY MOUTH DAILY PRN)    albuterol (PROVENTIL VENTOLIN) 2.5 mg /3 mL (0.083 %) nebulizer solution 3 mL by Nebulization route every four (4) hours as needed for Wheezing.  albuterol (VENTOLIN HFA) 90 mcg/actuation inhaler 2 puffs qid prn (Patient taking differently: Take 2 Puffs by inhalation every six (6) hours as needed for Wheezing or Shortness of Breath.)    acetaminophen (TYLENOL) 325 mg tablet Take 650 mg by mouth every four (4) hours as needed for Pain.  OXYGEN-AIR DELIVERY SYSTEMS 3 L by Nasal route continuous. No current facility-administered medications for this encounter.       ALLERGIES: Sulfa (sulfonamide antibiotics) and Aspirin  Social History     Socioeconomic History    Marital status:      Spouse name: Not on file    Number of children: Not on file    Years of education: Not on file    Highest education level: Not on file   Occupational History Employer: RETIRED     Comment: [employer]   Tobacco Use    Smoking status: Former Smoker     Packs/day: 0.25     Years: 42.00     Pack years: 10.50     Types: Cigarettes     Start date: 10/1/1956     Last attempt to quit: 2014     Years since quittin.8    Smokeless tobacco: Never Used   Substance and Sexual Activity    Alcohol use: No     Alcohol/week: 0.0 oz    Drug use: No   Other Topics Concern    Weight Concern Yes    Special Diet Yes   Social History Narrative    Lives with her daughter. Sybil Freeman and Kylie Gutierrez are caregivers. Ambulates only short distances. Social History     Tobacco Use   Smoking Status Former Smoker    Packs/day: 0.25    Years: 42.00    Pack years: 10.50    Types: Cigarettes    Start date: 10/1/1956    Last attempt to quit: 2014    Years since quittin.8   Smokeless Tobacco Never Used     Family History   Problem Relation Age of Onset    Heart Disease Mother         CHF    Hypertension Mother     Kidney Disease Mother     Heart Disease Father         CHF    Lung Disease Father     Diabetes Father     Cancer Father         prostate    Hypertension Father     Heart Attack Father     Heart Disease Maternal Aunt     Diabetes Brother     Coronary Artery Disease Other     Breast Cancer Neg Hx        ROS: The patient has no difficulty with chest pain or shortness of breath. No fever or chills. Comprehensive review of systems was otherwise unremarkable except as noted above. Physical Exam:   Visit Vitals  /55 (BP 1 Location: Left arm)   Pulse 68   Temp 98.4 °F (36.9 °C)   Resp 20   SpO2 93%     Constitutional: Alert, oriented, cooperative patient in no acute distress; appears stated age;  General appearance is within normal limits for wound care patient population. See the today's recorded vitals signs and constitutional data. Eyes: Pupils equal; Sclera are clear. EOMs intact; The eyes appear to track and move normally. The sclera are not injected.  The conjunctive are clear. The eyelids are normal. There is no scleral icterus. ENMT: There are no obvious external ear, nose, lip or mouth lesions. Nares normal; Neck: Overall contour of the neck is normal with no obvious neck masses. Gross hearing is within normal limits. No obvious neck masses  CV: RRR;  no JVD; No evidence of cyanosis of the upper extremities. The extremities are perfused without embolic sign, splinter hemorrhages, or petechia. Resp:  Breathing is non-labored; normal rate and effort; no audible wheezing. GI: soft and non-distended without acute abnormality noted. Musculoskeletal: unremarkable with normal function. No embolic signs or cyanosis. Neuro:  Oriented; moves all 4; no focal deficits  Psychiatric: Judgement and insight are within normal limits for the wound care population of patients. Patient is oriented to person, place, and time. Recent and remote memory are within normal limits. Mood and affect are within normal limits. Integumentary (Skin/Wounds)  See inspection of wound(s) below. There are no other skin areas of palpable concern. See wound center documentation including photos in the [address] Wound Template made part of this record by reference. Wounds:  Wound Knee Anterior; Left (Active)   Dressing Status  Clean, dry, and intact 3/7/2019  1:27 PM   Dressing Type  Negative pressure wound therapy 12/6/2018  3:57 PM   Non-Pressure Injury Full thickness (subcut/muscle) 3/7/2019  1:27 PM   Wound Length (cm) 0.2 cm 3/7/2019  1:27 PM   Wound Width (cm) 1 cm 3/7/2019  1:27 PM   Wound Depth (cm) 0.1 3/7/2019  1:27 PM   Wound Surface area (cm^2) 0.2 cm^2 3/7/2019  1:27 PM   Change in Wound Size % 99.64 3/7/2019  1:27 PM   Condition of Base Granulation 3/7/2019  1:27 PM   Condition of Edges Open 11/1/2018  1:43 PM   Tissue Type Red 3/7/2019  1:27 PM   Tissue Type Percent Red 100 3/7/2019  1:27 PM   Drainage Amount  Small  3/7/2019  1:27 PM   Drainage Color Serosanguinous 3/7/2019  1:27 PM   Wound Odor None 3/7/2019  1:27 PM   Periwound Skin Condition Intact 3/7/2019  1:27 PM   Cleansing and Cleansing Agents  Normal saline 3/7/2019  1:27 PM   Procedure Tolerated Well 12/13/2018  4:45 PM   Number of days: 140       [REMOVED] Vacuum Assisted Closure (Removed)   Number of days: 1       [REMOVED] Vacuum Assisted Closure Knee (Removed)   Number of days: 66       [REMOVED] Vacuum Assisted Closure Anterior; Left Knee (Removed)   Number of days:        [REMOVED] Wound Knee Left (Removed)   Number of days: 29       [REMOVED] Wound (Removed)   Number of days: 6       [REMOVED] Wound Leg Lower Anterior; Inferior;Left;Lower (Removed)   Number of days:                               Recent vitals (if inpt):  Patient Vitals for the past 24 hrs:   BP Temp Pulse Resp SpO2   03/07/19 1323 112/55 98.4 °F (36.9 °C) 68 20 93 %       Labs:  No results for input(s): WBC, HGB, PLT, NA, K, CL, CO2, BUN, CREA, GLU, PTP, INR, APTT, TBIL, TBILI, CBIL, SGOT, GPT, ALT, AP, AML, LPSE, LCAD, NH4, TROPT, TROIQ, PCO2, PO2, HCO3, HGBEXT, PLTEXT, HGBEXT, PLTEXT in the last 72 hours. No lab exists for component:  PH, INREXT, INREXT    Lab Results   Component Value Date/Time    WBC 7.2 02/26/2019 01:55 PM    HGB 9.4 (L) 02/26/2019 01:55 PM    PLATELET 918 44/27/7412 01:55 PM    Sodium 139 01/15/2019 10:31 AM    Potassium 4.4 01/15/2019 10:31 AM    Chloride 97 (L) 01/15/2019 10:31 AM    CO2 40 (H) 01/15/2019 10:31 AM    BUN 56 (H) 01/15/2019 10:31 AM    Creatinine 2.05 (H) 01/15/2019 10:31 AM    Glucose 184 (H) 01/15/2019 10:31 AM    INR 1.7 01/02/2019 06:23 AM    INR, External 2.3 02/20/2019    INR POC 2.3 (A) 02/20/2019 12:21 PM    aPTT 60.2 (H) 10/10/2018 04:02 AM    Bilirubin, total 0.5 01/15/2019 10:31 AM    AST (SGOT) 8 (L) 01/15/2019 10:31 AM    ALT (SGPT) 17 01/15/2019 10:31 AM    Alk.  phosphatase 61 01/15/2019 10:31 AM    Amylase 88 01/31/2012 03:12 PM    Lipase 157 10/08/2018 04:26 AM    Lactic acid 0.7 02/16/2016 06:16 AM    Troponin-I <0.05 01/31/2012 03:32 PM    Troponin-I, Qt. 0.05 12/31/2018 12:56 PM       I reviewed recent labs and recent radiologic studies. I independently reviewed radiology images for studies I described above or studies I have ordered. Admission date (for inpatients): 3/7/2019   * No surgery found *  * No surgery found *      ASSESSMENT/PLAN:  Significant improvement since discharge from the hospital status post debridement on 10/7/2018 and VAC placement. She had PuraplyAM skin substitute graft on 11/1/2018. The VAC was maintained for 1 week without change. The wound is large and full-thickness. She was scheduled for 1 week follow-up after evaluation on 11/8. She became hospitalized and missed her follow-up appointments. She was hospitalized with respiratory issues and is on home oxygen as well as her chronic anticoagulation. She has developed some bloody drainage from the [facility]. The wound has improved dramatically with 100% granulation base. PuraplyAM #2 was placed on 12/6 and well tolerated. We discontinued the VAC since it had filled with blood. However, on 12/20 her wound had a very bad odor. The graft was somewhat soupy. The graft was intact. There appears to be an over abundance of granulation. We did not place another graft, but dressed with Hydrofera Blue ready. The wound did calm down but did not require any silver nitrate. However the dressing has come down from her knee. We will need to immobilize her knee better and wrap at higher to maintain a good dressing. This will be critical to perform any grafting procedure without stabilization with a VAC. She missed 1/3 appointment due to being hospitalized for CHF exacerbation. We obtained a knee immobilizer. We will try for the next week to see stable dressing. I will not doing grafting procedures until we have stabilization of the wound and dressings.   Unfortunately she did not tolerate the knee immobilizer. We will not pursue grafting but see if we can get this to heal from the [facility]. No further granulation tissue. Wound is nearly healed. With selective debridement there is good viable non-hyper granulating wound base. Epithelialization has progressed nicely. I will see her back in 2 weeks and suspect the wound will be closed.     Problem List  Date Reviewed: 1/15/2019          Codes Class Noted    Excessive granulation tissue ICD-10-CM: L92.9  ICD-9-CM: 701.5  1/24/2019        Severe obesity ([facility] ICD-10-CM: E66.01  ICD-9-CM: 278.01  1/15/2019        Skin defect ICD-10-CM: L98.9  ICD-9-CM: 709.8  5/47/2730        Systolic CHF, acute on chronic [facility]) ICD-10-CM: I50.23  ICD-9-CM: 428.23, 428.0  12/31/2018        Acute on chronic combined systolic and diastolic CHF (congestive heart failure) ([facility] ICD-10-CM: I50.43  ICD-9-CM: 428.43, 428.0  12/31/2018        Acute exacerbation of CHF (congestive heart failure) ([facility] ICD-10-CM: I50.9  ICD-9-CM: 428.0  11/14/2018        Debility ICD-10-CM: R53.81  ICD-9-CM: 799.3  9/8/2018        Anemia (Chronic) ICD-10-CM: D64.9  ICD-9-CM: 285.9  9/7/2018        Chronic respiratory failure with hypoxia (HCC) ICD-10-CM: J96.11  ICD-9-CM: 518.83, 799.02  9/7/2018        Encounter for immunization ICD-10-CM: Z23  ICD-9-CM: V03.89  8/21/2018        Physical debility (Chronic) ICD-10-CM: R53.81  ICD-9-CM: 799.3  5/2/2018        Closed nondisplaced fracture of shaft of fifth metacarpal bone of left hand ICD-10-CM: S62.357A  ICD-9-CM: 815.03  4/28/2018        Type 2 diabetes mellitus with nephropathy (HCC) (Chronic) ICD-10-CM: E11.21  ICD-9-CM: 250.40, 583.81  12/18/2017        S/P AVR (aortic valve replacement) (Chronic) ICD-10-CM: Z95.2  ICD-9-CM: V43.3  Unknown    Overview Signed 2/23/2016 11:04 AM by Savage Trimble     Mechanical/Artific             Cardiomyopathy ([facility] (Chronic) ICD-10-CM: I42.9  ICD-9-CM: 425.4  Unknown        Osteopenia ICD-10-CM: M85.80  ICD-9-CM: 733.90  Unknown        HLD (hyperlipidemia) (Chronic) ICD-10-CM: E78.5  ICD-9-CM: 272.4  Unknown        Osteoarthritis ICD-10-CM: M19.90  ICD-9-CM: 715.90  Unknown        ICD (implantable cardioverter-defibrillator) in place ICD-10-CM: Z95.810  ICD-9-CM: V45.02  10/2/2014    Overview Signed 10/2/2014  4:58 PM by Eulalia Cardenas single-chamber ICD implantation 10/20/14             COPD (chronic obstructive pulmonary disease) ([facility] (Chronic) ICD-10-CM: J44.9  ICD-9-CM: 020  4/2/2014    Overview Signed 10/6/2018  8:56 PM by Roberto Valerio NP     HOME OXYGEN 3 LITERS             REJI (obstructive sleep apnea)-cpap (Chronic) ICD-10-CM: G47.33  ICD-9-CM: 327.23  4/2/2014        CKD (chronic kidney disease) stage 3, GFR 30-59 ml/min (HCC) (Chronic) ICD-10-CM: N18.3  ICD-9-CM: 585.3  7/10/2013        Anticoagulated on Coumadin (Chronic) ICD-10-CM: Z51.81, Z79.01  ICD-9-CM: V58.83, V58.61  7/9/2013    Overview Signed 7/9/2013  4:16 PM by Kip De Leon NP     S/P AVR             CAD (coronary artery disease) (Chronic) ICD-10-CM: I25.10  ICD-9-CM: 414.00  1/20/2013    Overview Signed 5/20/2014 10:05 AM by Joan Grossman NP     5/8/14 PCI LAD with stent placed             Chronic combined systolic and diastolic heart failure ([facility] (Chronic) ICD-10-CM: I50.42  ICD-9-CM: 428.42  1/20/2013    Overview Addendum 4/28/2018  4:53 AM by Jeffry Swan MD     5/8/14 ECHO:  EF 10-15%  12/2017:  EF 25-30%             Essential hypertension, benign (Chronic) ICD-10-CM: I10  ICD-9-CM: 401.1  1/20/2013        MDS (myelodysplastic syndrome) ([address] (Chronic) ICD-10-CM: D46.9  ICD-9-CM: 238.75  12/17/2011    Overview Addendum 8/29/2013 11:06 AM by Chacha David started in August, 2011 12/18/11 Procrit weekly and Iron stores. 5-12         12-13-12 good response to 3 weekly procrit did not need it last time    3-7-13 Pt doing well. Just wanted a \"check-up. \" Responding to Procrit every three weeks.  8-29-13 patient has missed some injections on recently restarted hemoglobin only issue , takes oral iron iron stores each time                Iron deficiency anemia due to chronic blood loss (Chronic) ICD-10-CM: D50.0  ICD-9-CM: 280.0  7/29/2009            Active Problems:    * No active hospital problems. *       Any procedures done today in the wound center are documented in a separate note in connect care made part of this record by reference     Wound Care  Orders Placed This Encounter    WOUND CARE, DRESSING CHANGE     Left anterior knee wound:  Cleanse wound with normal saline. Apply Xeroform, ABD, and wrap with unna boot or coban 2.    Home health to change dressing 3 times per week.        Standing Status:   Standing     Number of Occurrences:   1             Follow-up Information     Follow up With Specialties Details Why Contact Info    [facility] Wound Care In 2 weeks  [facility] [address] Brown [id number]          Signed:  Pratibha Majano MD,  FACS

## 2019-03-07 NOTE — DISCHARGE INSTRUCTIONS
Left anterior knee wound:  Cleanse wound with normal saline. Apply Xeroform, ABD, and wrap with unna boot or coban 2. Home health to change dressing 3 times per week.

## 2019-03-07 NOTE — WOUND CARE
03/07/19 1327   Wound Knee Anterior; Left   Date First Assessed/Time First Assessed: 10/18/18 1505   POA: Yes  Wound Type: Trauma  Location: Knee  Wound Description (Optional): #1  Orientation: Anterior; Left   Dressing Status  Clean, dry, and intact   Dressing Type  (hydrofera blue, abd, coflex calamine)   Non-Pressure Injury Full thickness (subcut/muscle)   Wound Length (cm) 0.2 cm   Wound Width (cm) 1 cm   Wound Depth (cm) 0.1   Wound Surface area (cm^2) 0.2 cm^2   Change in Wound Size % 99.64   Condition of Base Granulation   Tissue Type Red   Tissue Type Percent Red 100   Drainage Amount  Small    Drainage Color Serosanguinous   Wound Odor None   Periwound Skin Condition Intact   Cleansing and Cleansing Agents  Normal saline       Patient is taking Aspirin and Warfarin daily.

## 2019-03-07 NOTE — PROCEDURES
Wound Center Debridement    Patient: Kamila Harmon MRN: [id number]  SSN: [id number]    YOB: 1950  Age: 68 y.o.   Sex: female      March 7, 2019     Procedure Performed By: See Zhao MD    Wound: 1 Left  Trauma Other site To Fat Layer    Type of Debridement:  Selective      Time Out Taken: Yes    Pain Control: N/A      Type of Tissue Removed: Biofilm, Callus, Exudate and Subcutaneous    Frequency of Debridements: PRN    Consent in chart     Instrument: Blade     Bleeding: Minimal     Hemostasis: Pressure     Procedural Pain: 0    Post-Procedural Pain: 0    Pre-Debridement Measurements: 0.2 x 1 x 0.1 cm    Post-Debridement Measurements: 0.3 x 1.5 x 0.1 cm    Surface Area Debrided: 0.2 sq. cm    Response to Procedure: tolerated the procedure well with no complications

## 2019-03-19 NOTE — PROGRESS NOTES
Arrived to the [facility]. Assessment and Procrit injection completed. Patient tolerated well. Any issues or concerns during appointment No. 
Patient aware of next infusion appointment on 4/9/19 at 12. Discharged w/c with family member.  
 
Sam Foster RN

## 2019-03-21 NOTE — PROGRESS NOTES
WOUND CENTER Consult Note/Progress Note:  
Reggie Srinivasan  MRN: [id number]  GGX:4/3/[birth year]  Age:69 y.o. 
 
HPI: Reggie Srinivasan is a 68 y.o. female who is a 77 y.o. female \"with extensive medical history as noted below on chronic coumadin who initially presented to ER 9/7 after fall in bathroom 8/28 where she landed directly on her left knee with complaints of dizziness and fatigue and found to have Hgb of 6.7.  She was transfused to Hgb of 9.6.  She also was found with a large hematoma/bullae over her left knee and INR of 4.3.  She underwent an I+D per Dr Korin Cantu on 9/11 with a large amount of serosanguinous fluid evacuated. Shona Spatz has been followed by home health PT, OT and wound care since discharge from rehab on ninth floor 9/11-9/21. Shona Spatz has done well except the wound has not closed.  Two days ago, [facility] RN and daughter who is an RN noted mild erythema and heat surrounding upper half of wound.  Open area approx 5-6 cm in circumference.  No purulent drainage, oozing small amounts of dark blood with slightly foul odor.  Full ROM to knee.  All distal circulation, motion and sensation WNL for patient.  No distal edema, mild local edema.  Multiple old scars from prior lower extremity wounds.  Patient denies pain to area, fever, systemic symptoms.  She does not appear septic and lactic acid is 1.8.  WBC is 11.6 without shift.  Wound cultured in ER prior to administration of antibiotics.  Begun on vancomycin and ceftriaxone until cultures resulted. Sergio Hampton, attentive family at bedside. Creatinine 1.48 on admission with baseline of 1.2.  Of note, patient reports diarrhea x 4 days without use of antibiotics, additional GI symptoms. \"   
  
  
10/7/18: Pt sitting up on side of the bed just finishing breakfast. No complaints. AF, NAD. WBC 8.8, Pt taking Coumadin 2.5mg qhs. INR 3.0. 
10/8/18: POD1 s/p left knee debridement; awake in bed, no complaints. AF, VSS. Cx growing P. Mirabilis. 10/9/18: POD2 s/p left knee debridement; awake in chair, no complaints. AF, VSS. Cx growing P. Mirabilis. Vac placed today and functioning without leak 10/10/18: POD3 s/p left knee debridement; awake in chair, no complaints. AF, VSS. Cx growing P. Mirabilis. Vac functioning without leak or bleeding This information was obtained from the patient The following HPI elements were documented for the patient's wound: 
Location: L below knee anterior Duration: 8/28/2018 Severity:  No pain Context:  Fall in bathroom on coumadin, secondary infection Modifying Factors:  Nothing makes better or worse. Improved with debridement on 10/7/2018 and VAC Quality:  n/a Timing:  n/a Associated Signs and Symptoms: 
 
 
 
Past Medical History:  
Diagnosis Date  Anticoagulated on Coumadin 7/9/2013 S/P AVR  CAD (coronary artery disease) 1/20/2013 5/8/14 PCI LAD with stent placed  Cardiomyopathy ([address]  CHF (congestive heart failure) ([address] 2/17/2017  CKD (chronic kidney disease) stage 3, GFR 30-59 ml/min ([facility]) 7/10/2013  COPD (chronic obstructive pulmonary disease) ([address] 4/2/2014  Diabetes ([address]  Essential hypertension, benign 1/20/2013  HLD (hyperlipidemia)  ICD (implantable cardioverter-defibrillator) in place 10/2/2014 Biotronik single-chamber ICD implantation 10/20/14  Iron deficiency anemia due to chronic blood loss 7/29/2009  MDS (myelodysplastic syndrome) ([address] 12/17/2011 Procrit started in August, 2011 12/18/11 Procrit weekly and Iron stores. 5-12 12-13-12 good response to 3 weekly procrit did not need it last time  3-7-13 Pt doing well. Just wanted a \"check-up. \" Responding to Procrit every three weeks. 8-29-13 patient has missed some injections on recently restarted hemoglobin only issue , takes oral iron iron stores each time  REJI (obstructive sleep apnea)-cpap 4/2/2014  Osteoarthritis  Osteopenia  Pulmonary hypertension ([address] 6/15/2016  Quadrantanopia  Skin defect 2018  Visual complaint 2015 Past Surgical History:  
Procedure Laterality Date [address]    HX AORTIC VALVE REPLACEMENT  ,   
 mechanical valve   HX  SECTION    
 HX CORONARY STENT PLACEMENT  May, 2014 STEMI with Stent placement.  HX ENDOSCOPY  2009 EGD [address] CATHETERIZATION    HX PACEMAKER  10/2/2014 Biotronik ICD  HX TUBAL LIGATION    
 IR BX BONE MARROW DIAGNOSTIC  2011 Current Outpatient Medications Medication Sig  
 traZODone (DESYREL) 50 mg tablet TAKE 1/2-1 TABS BY MOUTH NIGHTLY.  carvedilol (COREG) 6.25 mg tablet Take 1 Tab by mouth two (2) times daily (with meals).  traMADol (ULTRAM) 50 mg tablet Take 1 Tab by mouth every six (6) hours as needed for Pain. Max Daily Amount: 200 mg.  cholecalciferol, vitamin D3, (VITAMIN D3) 2,000 unit tab Take 2,000 Units by mouth daily.  warfarin (COUMADIN) 5 mg tablet Take 5 mg by mouth every Monday, Wednesday, Friday. Take as directed: 
take one 5mg tablet on Monday, Wednesday, Friday 
take 2.5mg (half of 5mg tablet) on all other days , Thurs, Sat, Sun  
 spironolactone (ALDACTONE) 25 mg tablet Take 25 mg by mouth daily.  nitroglycerin (NITROSTAT) 0.4 mg SL tablet 1 Tab by SubLINGual route every five (5) minutes as needed for Chest Pain.  sertraline (ZOLOFT) 50 mg tablet Take 1 Tab by mouth daily.  simvastatin (ZOCOR) 20 mg tablet Take 1 Tab by mouth nightly.  sacubitril-valsartan (ENTRESTO) 24 mg/26 mg tablet Take 1 Tab by mouth two (2) times a day.  fluticasone-vilanterol (BREO ELLIPTA) 200-25 mcg/dose inhaler Take 1 Puff by inhalation daily.  OTHER Trilogy  mometasone-formoterol (DULERA) 200-5 mcg/actuation HFA inhaler 2 puffs bid, rinse mouth after use. (Patient taking differently: Take 2 Puffs by inhalation two (2) times daily as needed.)  isosorbide mononitrate ER (IMDUR) 30 mg tablet Take 30 mg by mouth daily.  aspirin delayed-release 81 mg tablet Take 81 mg by mouth daily.  warfarin (COUMADIN) 2.5 mg tablet Take 2.5 mg by mouth nightly. Needs 5mg on Friday  docusate sodium (COLACE) 50 mg capsule Take 50 mg by mouth daily.  furosemide (LASIX) 40 mg tablet Take 1 Tab by mouth daily.  multivit-minerals/folic acid (ADULT ONE DAILY MULTIVITAMIN PO) Take 1 Tab by mouth daily.  ascorbic acid, vitamin C, (VITAMIN C) 500 mg tablet Take 500 mg by mouth daily.  ferrous gluconate 324 mg (38 mg iron) tablet TAKE 1 TAB BY MOUTH DAILY (BEFORE BREAKFAST). INDICATIONS: IRON DEFICIENCY ANEMIA (Patient taking differently: Take 325 mg by mouth two (2) times daily (with meals). )  fluticasone (FLONASE) 50 mcg/actuation nasal spray 2 Sprays by Both Nostrils route daily as needed. (Patient taking differently: 2 Sprays by Both Nostrils route daily as needed for Allergies.)  loratadine (CLARITIN) 10 mg tablet TAKE 1 TAB BY MOUTH DAILY. (Patient taking differently: TAKE 1 TAB BY MOUTH DAILY PRN)  albuterol (PROVENTIL VENTOLIN) 2.5 mg /3 mL (0.083 %) nebulizer solution 3 mL by Nebulization route every four (4) hours as needed for Wheezing.  albuterol (VENTOLIN HFA) 90 mcg/actuation inhaler 2 puffs qid prn (Patient taking differently: Take 2 Puffs by inhalation every six (6) hours as needed for Wheezing or Shortness of Breath.)  acetaminophen (TYLENOL) 325 mg tablet Take 650 mg by mouth every four (4) hours as needed for Pain.  OXYGEN-AIR DELIVERY SYSTEMS 3 L by Nasal route continuous. No current facility-administered medications for this encounter. ALLERGIES: Sulfa (sulfonamide antibiotics) and Aspirin Social History Socioeconomic History  Marital status:  Spouse name: Not on file  Number of children: Not on file  Years of education: Not on file  Highest education level: Not on file Occupational History Employer: RETIRED Comment: [employer] Tobacco Use  Smoking status: Former Smoker Packs/day: 0.25 Years: 42.00 Pack years: 10.50 Types: Cigarettes Start date: 10/1/1956 Last attempt to quit: 2014 Years since quittin.8  Smokeless tobacco: Never Used Substance and Sexual Activity  Alcohol use: No  
  Alcohol/week: 0.0 oz  Drug use: No  
Other Topics Concern  Weight Concern Yes  Special Diet Yes Social History Narrative Lives with her daughter. Kailee Davies and Sarai Edouard are caregivers. Ambulates only short distances. Social History Tobacco Use Smoking Status Former Smoker  Packs/day: 0.25  
 Years: 42.00  
 Pack years: 10.50  Types: Cigarettes  Start date: 10/1/1956  Last attempt to quit: 2014  Years since quittin.8 Smokeless Tobacco Never Used Family History Problem Relation Age of Onset  Heart Disease Mother CHF  Hypertension Mother  Kidney Disease Mother  Heart Disease Father CHF  Lung Disease Father  Diabetes Father  Cancer Father   
     prostate  Hypertension Father  Heart Attack Father  Heart Disease Maternal Aunt  Diabetes Brother  Coronary Artery Disease Other  Breast Cancer Neg Hx   
 
 
ROS: The patient has no difficulty with chest pain or shortness of breath. No fever or chills. Comprehensive review of systems was otherwise unremarkable except as noted above. Physical Exam:  
Visit Vitals /80 (BP 1 Location: Right arm) Pulse 79 Temp 98.5 °F (36.9 °C) Resp 20 Ht 5' 2\" (1.575 m) Wt 203 lb (92.1 kg) SpO2 94% BMI 37.13 kg/m² Constitutional: Alert, oriented, cooperative patient in no acute distress; appears stated age;  General appearance is within normal limits for wound care patient population. See the today's recorded vitals signs and constitutional data. Eyes: Pupils equal; Sclera are clear. EOMs intact; The eyes appear to track and move normally. The sclera are not injected. The conjunctive are clear. The eyelids are normal. There is no scleral icterus. ENMT: There are no obvious external ear, nose, lip or mouth lesions. Nares normal; Neck: Overall contour of the neck is normal with no obvious neck masses. Gross hearing is within normal limits. No obvious neck masses CV: RRR;  no JVD; No evidence of cyanosis of the upper extremities. The extremities are perfused without embolic sign, splinter hemorrhages, or petechia. Resp:  Breathing is non-labored; normal rate and effort; no audible wheezing. GI: soft and non-distended without acute abnormality noted. Musculoskeletal: unremarkable with normal function. No embolic signs or cyanosis. Neuro:  Oriented; moves all 4; no focal deficits Psychiatric: Judgement and insight are within normal limits for the wound care population of patients. Patient is oriented to person, place, and time. Recent and remote memory are within normal limits. Mood and affect are within normal limits. Integumentary (Skin/Wounds) See inspection of wound(s) below. There are no other skin areas of palpable concern. See wound center documentation including photos in the [address] Wound Template made part of this record by reference. Wounds: 
Wound Knee Anterior; Left (Active) Dressing Status  Clean, dry, and intact 3/21/2019  1:13 PM  
Dressing Type  Negative pressure wound therapy 12/6/2018  3:57 PM  
Non-Pressure Injury Full thickness (subcut/muscle) 3/21/2019  1:13 PM  
Wound Length (cm) 1 cm 3/21/2019  1:13 PM  
Wound Width (cm) 3 cm 3/21/2019  1:13 PM  
Wound Depth (cm) 0.1 3/21/2019  1:13 PM  
Wound Surface area (cm^2) 3 cm^2 3/21/2019  1:13 PM  
Change in Wound Size % 94.67 3/21/2019  1:13 PM  
Condition of Base Granulation 3/21/2019  1:13 PM  
Condition of Edges Open 11/1/2018  1:43 PM  
 Tissue Type Red 3/7/2019  1:27 PM  
Tissue Type Percent Pink 100 3/21/2019  1:13 PM  
Tissue Type Percent Red 100 3/7/2019  1:27 PM  
Drainage Amount  Small  3/21/2019  1:13 PM  
Drainage Color Serosanguinous 3/21/2019  1:13 PM  
Wound Odor None 3/21/2019  1:13 PM  
Periwound Skin Condition Intact 3/21/2019  1:13 PM  
Cleansing and Cleansing Agents  Normal saline 3/21/2019  1:13 PM  
Procedure Tolerated Well 3/21/2019  1:13 PM  
Number of days: 154 [REMOVED] Vacuum Assisted Closure (Removed) Number of days: 1 [REMOVED] Vacuum Assisted Closure Knee (Removed) Number of days: 77  
   
[REMOVED] Vacuum Assisted Closure Anterior; Left Knee (Removed) Number of days:   
   
[REMOVED] Wound Knee Left (Removed) Number of days: 29  
   
[REMOVED] Wound (Removed) Number of days: 6 [REMOVED] Wound Leg Lower Anterior; Inferior;Left;Lower (Removed) Number of days:   
  
 
 
 
 
 
 
 
 
Recent vitals (if inpt): 
Patient Vitals for the past 24 hrs: 
 BP Temp Pulse Resp SpO2 Height Weight  
03/21/19 1323 112/80 98.5 °F (36.9 °C) 79 20 94 % 5' 2\" (1.575 m) 203 lb (92.1 kg) Labs: 
Recent Labs  
  03/19/19 
1326 HGB 9.8* Lab Results Component Value Date/Time WBC 7.2 02/26/2019 01:55 PM  
 HGB 9.8 (L) 03/19/2019 01:26 PM  
 PLATELET 886 90/96/5650 01:55 PM  
 Sodium 139 01/15/2019 10:31 AM  
 Potassium 4.4 01/15/2019 10:31 AM  
 Chloride 97 (L) 01/15/2019 10:31 AM  
 CO2 40 (H) 01/15/2019 10:31 AM  
 BUN 56 (H) 01/15/2019 10:31 AM  
 Creatinine 2.05 (H) 01/15/2019 10:31 AM  
 Glucose 184 (H) 01/15/2019 10:31 AM  
 INR 1.7 01/02/2019 06:23 AM  
 INR, External 2.3 02/20/2019 INR POC 2.3 (A) 02/20/2019 12:21 PM  
 aPTT 60.2 (H) 10/10/2018 04:02 AM  
 Bilirubin, total 0.5 01/15/2019 10:31 AM  
 AST (SGOT) 8 (L) 01/15/2019 10:31 AM  
 ALT (SGPT) 17 01/15/2019 10:31 AM  
 Alk.  phosphatase 61 01/15/2019 10:31 AM  
 Amylase 88 01/31/2012 03:12 PM  
 Lipase 157 10/08/2018 04:26 AM  
 Lactic acid 0.7 02/16/2016 06:16 AM  
 Troponin-I <0.05 01/31/2012 03:32 PM  
 Troponin-I, Qt. 0.05 12/31/2018 12:56 PM  
 
 
I reviewed recent labs and recent radiologic studies. I independently reviewed radiology images for studies I described above or studies I have ordered. Admission date (for inpatients): 3/21/2019 * No surgery found *  * No surgery found * ASSESSMENT/PLAN: 
Significant improvement since discharge from the hospital status post debridement on 10/7/2018 and VAC placement. She had PuraplyAM skin substitute graft on 11/1/2018. The VAC was maintained for 1 week without change. The wound is large and full-thickness. She was scheduled for 1 week follow-up after evaluation on 11/8. She became hospitalized and missed her follow-up appointments. She was hospitalized with respiratory issues and is on home oxygen as well as her chronic anticoagulation. She has developed some bloody drainage from the [facility]. The wound has improved dramatically with 100% granulation base. PuraplyAM #2 was placed on 12/6 and well tolerated. We discontinued the VAC since it had filled with blood. However, on 12/20 her wound had a very bad odor. The graft was somewhat soupy. The graft was intact. There appears to be an over abundance of granulation. We did not place another graft, but dressed with Hydrofera Blue ready. The wound did calm down but did not require any silver nitrate. However the dressing has come down from her knee. We will need to immobilize her knee better and wrap at higher to maintain a good dressing. This will be critical to perform any grafting procedure without stabilization with a VAC. She missed 1/3 appointment due to being hospitalized for CHF exacerbation. We obtained a knee immobilizer. We will try for the next week to see stable dressing.   I will not doing grafting procedures until we have stabilization of the wound and dressings. Unfortunately she did not tolerate the knee immobilizer. We will not pursue grafting but see if we can get this to heal from the [facility]. Wound is somewhat stalled from 2 weeks ago. There is some hyper granulation today and continued problems with the dressing staying up. The wound was cauterized with silver nitrate. We will continue to dress with Xeroform but will use a cover site to prevent further dressing migration. I will see her back in 3 weeks. Problem List  Date Reviewed: 1/15/2019 Codes Class Noted Excessive granulation tissue ICD-10-CM: L92.9 ICD-9-CM: 701.5  1/24/2019 Severe obesity ([address] ICD-10-CM: E66.01 
ICD-9-CM: 278.01  1/15/2019 Skin defect ICD-10-CM: L98.9 ICD-9-CM: 709.8  1/10/2019 Systolic CHF, acute on chronic (HCC) ICD-10-CM: Q71.72 ICD-9-CM: 428.23, 428.0  12/31/2018 Acute on chronic combined systolic and diastolic CHF (congestive heart failure) (HCC) ICD-10-CM: I50.43 ICD-9-CM: 428.43, 428.0  12/31/2018 Acute exacerbation of CHF (congestive heart failure) (HCC) ICD-10-CM: I50.9 ICD-9-CM: 428.0  11/14/2018 Debility ICD-10-CM: R53.81 ICD-9-CM: 799.3  9/8/2018 Anemia (Chronic) ICD-10-CM: D64.9 ICD-9-CM: 285.9  9/7/2018 Chronic respiratory failure with hypoxia [facility]) ICD-10-CM: J96.11 
ICD-9-CM: 518.83, 799.02  9/7/2018 Encounter for immunization ICD-10-CM: G25 ICD-9-CM: V03.89  8/21/2018 Physical debility (Chronic) ICD-10-CM: R53.81 ICD-9-CM: 799.3  5/2/2018 Closed nondisplaced fracture of shaft of fifth metacarpal bone of left hand ICD-10-CM: I17.053K ICD-9-CM: 815.03  4/28/2018 Type 2 diabetes mellitus with nephropathy (HCC) (Chronic) ICD-10-CM: E11.21 
ICD-9-CM: 250.40, 583.81  12/18/2017 S/P AVR (aortic valve replacement) (Chronic) ICD-10-CM: Z95.2 ICD-9-CM: V43.3  Unknown Overview Signed 2/23/2016 11:04 AM by Juve Freeman Mechanical/Artific Cardiomyopathy [address] (Chronic) ICD-10-CM: I42.9 ICD-9-CM: 425.4  Unknown Osteopenia ICD-10-CM: M85.80 ICD-9-CM: 733.90  Unknown HLD (hyperlipidemia) (Chronic) ICD-10-CM: H29.3 ICD-9-CM: 272.4  Unknown Osteoarthritis ICD-10-CM: M19.90 ICD-9-CM: 715.90  Unknown  
   
 ICD (implantable cardioverter-defibrillator) in place ICD-10-CM: Z95.810 ICD-9-CM: V45.02  10/2/2014 Overview Signed 10/2/2014  4:58 PM by Nirmal Carnes [employer]ik single-chamber ICD implantation 10/20/14 COPD (chronic obstructive pulmonary disease) (HCC) (Chronic) ICD-10-CM: J44.9 ICD-9-CM: 670  4/2/2014 Overview Signed 10/6/2018  8:56 PM by Jesi Chowdary NP  
  HOME OXYGEN 3 LITERS 
  
  
   
 REJI (obstructive sleep apnea)-cpap (Chronic) ICD-10-CM: G47.33 
ICD-9-CM: 327.23  4/2/2014 CKD (chronic kidney disease) stage 3, GFR 30-59 ml/min (HCC) (Chronic) ICD-10-CM: N18.3 ICD-9-CM: 585.3  7/10/2013 Anticoagulated on Coumadin (Chronic) ICD-10-CM: Z51.81, Z79.01 
ICD-9-CM: V58.83, V58.61  7/9/2013 Overview Signed 7/9/2013  4:16 PM by Cole Ewing NP  
  S/P AVR 
  
  
   
 CAD (coronary artery disease) (Chronic) ICD-10-CM: I25.10 ICD-9-CM: 414.00  1/20/2013 Overview Signed 5/20/2014 10:05 AM by Solo Miller NP  
  5/8/14 PCI LAD with stent placed Chronic combined systolic and diastolic heart failure (HCC) (Chronic) ICD-10-CM: I50.42 
ICD-9-CM: 428.42  1/20/2013 Overview Addendum 4/28/2018  4:53 AM by Amalia Merlin, MD  
  5/8/14 ECHO:  EF 10-15% 12/2017:  EF 25-30% Essential hypertension, benign (Chronic) ICD-10-CM: I10 
ICD-9-CM: 401.1  1/20/2013 MDS (myelodysplastic syndrome) (HCC) (Chronic) ICD-10-CM: D46.9 ICD-9-CM: 238.75  12/17/2011 Overview Addendum 8/29/2013 11:06 AM by Tashia Livingston Procrit started in August, 2011 12/18/11 Procrit weekly and Iron stores. 5-12 12-13-12 good response to 3 weekly procrit did not need it last time 3-7-13 Pt doing well. Just wanted a \"check-up. \" Responding to Procrit every three weeks. 8-29-13 patient has missed some injections on recently restarted hemoglobin only issue , takes oral iron iron stores each time Iron deficiency anemia due to chronic blood loss (Chronic) ICD-10-CM: D50.0 ICD-9-CM: 280.0  7/29/2009 Active Problems: * No active hospital problems. * Any procedures done today in the wound center are documented in a separate note in connect care made part of this record by reference Wound Care Orders Placed This Encounter  WOUND CARE, DRESSING CHANGE Left anterior knee Cleanse wound with normal saline. Apply Xeroform to base of wound and cover with Coversite. Change dressing 3 times per week. Return to wound center in 3 weeks. Standing Status:   Standing Number of Occurrences:   1  silver nitrate applicators (ARZOL) 1 Applicator Follow-up Information Follow up With Specialties Details Why Contact Info [facility] OP WOUND CARE Wound Care In 3 weeks For wound re-check [address] [place] [id number] [address] P[facility] [phone] [id number] [phone] Signed:  Solo Self MD,  FACS

## 2019-03-21 NOTE — PROGRESS NOTES
03/21/19 1313 Wound Knee Anterior; Left Date First Assessed/Time First Assessed: 10/18/18 1505   POA: Yes  Wound Type: Trauma  Location: Knee  Wound Description (Optional): #1  Orientation: Anterior; Left Dressing Status  Clean, dry, and intact Dressing Type   
(Xeroform, ABD, Calamine Coflex TLC) Non-Pressure Injury Full thickness (subcut/muscle) Wound Length (cm) 1 cm Wound Width (cm) 3 cm Wound Depth (cm) 0.1 Wound Surface area (cm^2) 3 cm^2 Change in Wound Size % 94.67 Condition of Base Granulation Tissue Type Percent Pink 100 Drainage Amount  Small Drainage Color Serosanguinous Wound Odor None Periwound Skin Condition Intact Cleansing and Cleansing Agents  Normal saline Patient is currently taking Aspirin 81 mg and Warfarin.

## 2019-03-21 NOTE — WOUND CARE
[address] [place] [address], [facility] [address] Phone: [phone] Fax: [phone] Patient: Estee Corral MRN: [id number]  SSN: [id number] YOB: 1950  Age: 68 y.o. Sex: female Return Appointment: 3 weeks with Ja Hyde MD 
 
Instructions: Left anterior knee Cleanse wound with normal saline. Apply Xeroform to base of wound and cover with Coversite. Change dressing 3 times per week. Return to wound center in 3 weeks. Should you experience increased redness, swelling, pain, foul odor, size of wound(s), or have a temperature over 101 degrees please contact the [address] [place] [address] Road at [phone] or if after hours contact your primary care physician or go to the hospital emergency department. Signed By: Spero Klinefelter, PT, [address] [facility] March 21, 2019

## 2019-04-09 NOTE — PROGRESS NOTES
Arrived to the [facility]. Procrit completed. Patient tolerated well. Any issues or concerns during appointment: none. Patient aware of next infusion appointment on 4/30/19 at 1100. Discharged via wheelchair.  
 
 
Shakir Arora RN

## 2019-04-11 NOTE — PROGRESS NOTES
WOUND CENTER Consult Note/Progress Note:  
Daniel Dorsey  MRN: [id number]  ZQB:[birth year]  Age:69 y.o. 
 
HPI: Daniel Dorsey is a 68 y.o. female who returns to the wound center for continued follow-up of her left knee wound. She continues to dress with Xeroform. We have been seen significant improvement though the last stages have been slow. She was last seen 3 weeks ago. She is a 77 y.o. female \"with extensive medical history as noted below on chronic coumadin who initially presented to ER 9/7 after fall in bathroom 8/28 where she landed directly on her left knee with complaints of dizziness and fatigue and found to have Hgb of 6.7.  She was transfused to Hgb of 9.6.  She also was found with a large hematoma/bullae over her left knee and INR of 4.3.  She underwent an I+D per Dr Deana Pedraza on 9/11 with a large amount of serosanguinous fluid evacuated. Chapincito Castorena has been followed by home health PT, OT and wound care since discharge from rehab on ninth floor 9/11-9/21. Chapincito Castorena has done well except the wound has not closed.  Two days ago, [facility] RN and daughter who is an RN noted mild erythema and heat surrounding upper half of wound.  Open area approx 5-6 cm in circumference.  No purulent drainage, oozing small amounts of dark blood with slightly foul odor.  Full ROM to knee.  All distal circulation, motion and sensation WNL for patient.  No distal edema, mild local edema.  Multiple old scars from prior lower extremity wounds.  Patient denies pain to area, fever, systemic symptoms.  She does not appear septic and lactic acid is 1.8.  WBC is 11.6 without shift.  Wound cultured in ER prior to administration of antibiotics.  Begun on vancomycin and ceftriaxone until cultures resulted. Kenyatta Morgan, attentive family at bedside. Creatinine 1.48 on admission with baseline of 1.2.  Of note, patient reports diarrhea x 4 days without use of antibiotics, additional GI symptoms. \"   
  
  
 10/7/18: Pt sitting up on side of the bed just finishing breakfast. No complaints. AF, NAD. WBC 8.8, Pt taking Coumadin 2.5mg qhs. INR 3.0. 
10/8/18: POD1 s/p left knee debridement; awake in bed, no complaints. AF, VSS. Cx growing P. Mirabilis. 10/9/18: POD2 s/p left knee debridement; awake in chair, no complaints. AF, VSS. Cx growing P. Mirabilis. Vac placed today and functioning without leak 10/10/18: POD3 s/p left knee debridement; awake in chair, no complaints. AF, VSS. Cx growing P. Mirabilis. Vac functioning without leak or bleeding This information was obtained from the patient The following HPI elements were documented for the patient's wound: 
Location: L below knee anterior Duration: 8/28/2018 Severity:  No pain Context:  Fall in bathroom on coumadin, secondary infection Modifying Factors:  Nothing makes better or worse. Improved with debridement on 10/7/2018 and VAC Quality:  n/a Timing:  n/a Associated Signs and Symptoms: 
 
 
 
Past Medical History:  
Diagnosis Date  Anticoagulated on Coumadin 7/9/2013 S/P AVR  CAD (coronary artery disease) 1/20/2013 5/8/14 PCI LAD with stent placed  Cardiomyopathy ([address]  CHF (congestive heart failure) ([address] 2/17/2017  CKD (chronic kidney disease) stage 3, GFR 30-59 ml/min ([facility]) 7/10/2013  COPD (chronic obstructive pulmonary disease) ([address] 4/2/2014  Diabetes ([address]  Essential hypertension, benign 1/20/2013  HLD (hyperlipidemia)  ICD (implantable cardioverter-defibrillator) in place 10/2/2014 Bi[employer] single-chamber ICD implantation 10/20/14  Iron deficiency anemia due to chronic blood loss 7/29/2009  MDS (myelodysplastic syndrome) ([address] 12/17/2011 Procrit started in August, 2011 12/18/11 Procrit weekly and Iron stores. 5-12 12-13-12 good response to 3 weekly procrit did not need it last time  3-7-13 Pt doing well. Just wanted a \"check-up. \" Responding to Procrit every three weeks. 13 patient has missed some injections on recently restarted hemoglobin only issue , takes oral iron iron stores each time  REJI (obstructive sleep apnea)-cpap 2014  Osteoarthritis  Osteopenia  Pulmonary hypertension ([facility] 6/15/2016  Quadrantanopia  Skin defect 2018  Visual complaint 2015 Past Surgical History:  
Procedure Laterality Date [address]    HX AORTIC VALVE REPLACEMENT  ,   
 mechanical valve   HX  SECTION    
 HX CORONARY STENT PLACEMENT  May, 2014 STEMI with Stent placement.  HX ENDOSCOPY  2009 EGD [address] CATHETERIZATION    HX PACEMAKER  10/2/2014 Bi[employer]ronik ICD  HX TUBAL LIGATION    
 IR BX BONE MARROW DIAGNOSTIC  2011 Current Outpatient Medications Medication Sig  
 calcium carbonate (CALCIUM 600 PO) Take 1 Tab by mouth daily.  vitamin E (AQUA GEMS) 400 unit capsule Take 400 Units by mouth daily.  traZODone (DESYREL) 50 mg tablet TAKE 1/2-1 TABS BY MOUTH NIGHTLY.  carvedilol (COREG) 6.25 mg tablet Take 1 Tab by mouth two (2) times daily (with meals).  traMADol (ULTRAM) 50 mg tablet Take 1 Tab by mouth every six (6) hours as needed for Pain. Max Daily Amount: 200 mg.  cholecalciferol, vitamin D3, (VITAMIN D3) 2,000 unit tab Take 2,000 Units by mouth daily.  warfarin (COUMADIN) 5 mg tablet Take 5 mg by mouth every Monday, Wednesday, Friday. Take as directed: 
take one 5mg tablet on Monday, Wednesday, Friday 
take 2.5mg (half of 5mg tablet) on all other days , Thurs, Sat, Sun  
 spironolactone (ALDACTONE) 25 mg tablet Take 25 mg by mouth daily.  nitroglycerin (NITROSTAT) 0.4 mg SL tablet 1 Tab by SubLINGual route every five (5) minutes as needed for Chest Pain.  sertraline (ZOLOFT) 50 mg tablet Take 1 Tab by mouth daily.  simvastatin (ZOCOR) 20 mg tablet Take 1 Tab by mouth nightly.  sacubitril-valsartan (ENTRESTO) 24 mg/26 mg tablet Take 1 Tab by mouth two (2) times a day.  fluticasone-vilanterol (BREO ELLIPTA) 200-25 mcg/dose inhaler Take 1 Puff by inhalation daily.  OTHER Trilogy  mometasone-formoterol (DULERA) 200-5 mcg/actuation HFA inhaler 2 puffs bid, rinse mouth after use. (Patient taking differently: Take 2 Puffs by inhalation two (2) times daily as needed.)  isosorbide mononitrate ER (IMDUR) 30 mg tablet Take 30 mg by mouth daily.  aspirin delayed-release 81 mg tablet Take 81 mg by mouth daily.  warfarin (COUMADIN) 2.5 mg tablet Take 2.5 mg by mouth nightly. Needs 5mg on Friday  docusate sodium (COLACE) 50 mg capsule Take 50 mg by mouth daily.  furosemide (LASIX) 40 mg tablet Take 1 Tab by mouth daily.  multivit-minerals/folic acid (ADULT ONE DAILY MULTIVITAMIN PO) Take 1 Tab by mouth daily.  ascorbic acid, vitamin C, (VITAMIN C) 500 mg tablet Take 500 mg by mouth daily.  ferrous gluconate 324 mg (38 mg iron) tablet TAKE 1 TAB BY MOUTH DAILY (BEFORE BREAKFAST). INDICATIONS: IRON DEFICIENCY ANEMIA (Patient taking differently: Take 325 mg by mouth two (2) times daily (with meals). )  fluticasone (FLONASE) 50 mcg/actuation nasal spray 2 Sprays by Both Nostrils route daily as needed. (Patient taking differently: 2 Sprays by Both Nostrils route daily as needed for Allergies.)  loratadine (CLARITIN) 10 mg tablet TAKE 1 TAB BY MOUTH DAILY. (Patient taking differently: TAKE 1 TAB BY MOUTH DAILY PRN)  albuterol (PROVENTIL VENTOLIN) 2.5 mg /3 mL (0.083 %) nebulizer solution 3 mL by Nebulization route every four (4) hours as needed for Wheezing.  albuterol (VENTOLIN HFA) 90 mcg/actuation inhaler 2 puffs qid prn (Patient taking differently: Take 2 Puffs by inhalation every six (6) hours as needed for Wheezing or Shortness of Breath.)  acetaminophen (TYLENOL) 325 mg tablet Take 650 mg by mouth every four (4) hours as needed for Pain.  OXYGEN-AIR DELIVERY SYSTEMS 3 L by Nasal route continuous. No current facility-administered medications for this encounter. ALLERGIES: Sulfa (sulfonamide antibiotics) and Aspirin Social History Socioeconomic History  Marital status:  Spouse name: Not on file  Number of children: Not on file  Years of education: Not on file  Highest education level: Not on file Occupational History Employer: RETIRED Comment: [employer] Tobacco Use  Smoking status: Former Smoker Packs/day: 0.25 Years: 42.00 Pack years: 10.50 Types: Cigarettes Start date: 10/1/1956 Last attempt to quit: 2014 Years since quittin.9  Smokeless tobacco: Never Used Substance and Sexual Activity  Alcohol use: No  
  Alcohol/week: 0.0 oz  Drug use: No  
Other Topics Concern  Weight Concern Yes  Special Diet Yes Social History Narrative Lives with her daughter. Betty Vitale and Danny Morel are caregivers. Ambulates only short distances. Social History Tobacco Use Smoking Status Former Smoker  Packs/day: 0.25  
 Years: 42.00  
 Pack years: 10.50  Types: Cigarettes  Start date: 10/1/1956  Last attempt to quit: 2014  Years since quittin.9 Smokeless Tobacco Never Used Family History Problem Relation Age of Onset  Heart Disease Mother CHF  Hypertension Mother  Kidney Disease Mother  Heart Disease Father CHF  Lung Disease Father  Diabetes Father  Cancer Father   
     prostate  Hypertension Father  Heart Attack Father  Heart Disease Maternal Aunt  Diabetes Brother  Coronary Artery Disease Other  Breast Cancer Neg Hx   
 
 
ROS: The patient has no difficulty with chest pain or shortness of breath. No fever or chills. Comprehensive review of systems was otherwise unremarkable except as noted above. Physical Exam:  
Visit Vitals /83 (BP 1 Location: Left arm) Pulse 92 Temp 98.6 °F (37 °C) Resp 20 Ht 5' 2\" (1.575 m) Wt 212 lb (96.2 kg) SpO2 90% BMI 38.78 kg/m² Constitutional: Alert, oriented, cooperative patient in no acute distress; appears stated age;  General appearance is within normal limits for wound care patient population. See the today's recorded vitals signs and constitutional data. Eyes: Pupils equal; Sclera are clear. EOMs intact; The eyes appear to track and move normally. The sclera are not injected. The conjunctive are clear. The eyelids are normal. There is no scleral icterus. ENMT: There are no obvious external ear, nose, lip or mouth lesions. Nares normal; Neck: Overall contour of the neck is normal with no obvious neck masses. Gross hearing is within normal limits. No obvious neck masses CV: RRR;  no JVD; No evidence of cyanosis of the upper extremities. The extremities are perfused without embolic sign, splinter hemorrhages, or petechia. Resp:  Breathing is non-labored; normal rate and effort; no audible wheezing. GI: soft and non-distended without acute abnormality noted. Musculoskeletal: unremarkable with normal function. No embolic signs or cyanosis. Neuro:  Oriented; moves all 4; no focal deficits Psychiatric: Judgement and insight are within normal limits for the wound care population of patients. Patient is oriented to person, place, and time. Recent and remote memory are within normal limits. Mood and affect are within normal limits. Integumentary (Skin/Wounds) See inspection of wound(s) below. There are no other skin areas of palpable concern. See wound center documentation including photos in the [address] Wound Template made part of this record by reference. Wounds: 
Wound Knee Anterior; Left (Active) Dressing Status  Clean, dry, and intact 4/11/2019  2:00 PM  
Dressing Type  Negative pressure wound therapy 12/6/2018  3:57 PM  
 Non-Pressure Injury Full thickness (subcut/muscle) 4/11/2019  2:00 PM  
Wound Length (cm) 2 cm 4/11/2019  2:00 PM  
Wound Width (cm) 0.6 cm 4/11/2019  2:00 PM  
Wound Depth (cm) 0.1 4/11/2019  2:00 PM  
Wound Surface area (cm^2) 1.2 cm^2 4/11/2019  2:00 PM  
Change in Wound Size % 97.87 4/11/2019  2:00 PM  
Condition of Base Granulation 4/11/2019  2:00 PM  
Condition of Edges Open 11/1/2018  1:43 PM  
Tissue Type Red;Pink 4/11/2019  2:00 PM  
Tissue Type Percent Pink 50 4/11/2019  2:00 PM  
Tissue Type Percent Red 50 4/11/2019  2:00 PM  
Drainage Amount  Scant 4/11/2019  2:00 PM  
Drainage Color Serosanguinous 4/11/2019  2:00 PM  
Wound Odor None 4/11/2019  2:00 PM  
Periwound Skin Condition Intact 4/11/2019  2:00 PM  
Cleansing and Cleansing Agents  Normal saline 4/11/2019  2:00 PM  
Procedure Tolerated Well 4/11/2019  2:00 PM  
Number of days: 175 [REMOVED] Vacuum Assisted Closure (Removed) Number of days: 1 [REMOVED] Vacuum Assisted Closure Knee (Removed) Number of days: 77  
   
[REMOVED] Vacuum Assisted Closure Anterior; Left Knee (Removed) Number of days:   
   
[REMOVED] Wound Knee Left (Removed) Number of days: 29  
   
[REMOVED] Wound (Removed) Number of days: 6 [REMOVED] Wound Leg Lower Anterior; Inferior;Left;Lower (Removed) Number of days:   
  
 
 
 
 
 
 
 
 
 
Recent vitals (if inpt): 
Patient Vitals for the past 24 hrs: 
 BP Temp Pulse Resp SpO2 Height Weight 04/11/19 1400 132/83 98.6 °F (37 °C) 92 20 90 % 5' 2\" (1.575 m) 212 lb (96.2 kg) Labs: 
Recent Labs 04/09/19 
1103 WBC 5.9 HGB 9.4* *   
K 4.8  
 CO2 34* BUN 52* CREA 1.79* * TBILI 0.4 SGOT 12* ALT 15 AP 61 Lab Results Component Value Date/Time  WBC 5.9 04/09/2019 11:03 AM  
 HGB 9.4 (L) 04/09/2019 11:03 AM  
 PLATELET 292 (L) 72/35/8029 11:03 AM  
 Sodium 143 04/09/2019 11:03 AM  
 Potassium 4.8 04/09/2019 11:03 AM  
 Chloride 106 04/09/2019 11:03 AM  
 CO2 34 (H) 04/09/2019 11:03 AM  
 BUN 52 (H) 04/09/2019 11:03 AM  
 Creatinine 1.79 (H) 04/09/2019 11:03 AM  
 Glucose 129 (H) 04/09/2019 11:03 AM  
 INR 1.7 01/02/2019 06:23 AM  
 INR, External 2.4 04/05/2019  
 aPTT 60.2 (H) 10/10/2018 04:02 AM  
 Bilirubin, total 0.4 04/09/2019 11:03 AM  
 AST (SGOT) 12 (L) 04/09/2019 11:03 AM  
 ALT (SGPT) 15 04/09/2019 11:03 AM  
 Alk. phosphatase 61 04/09/2019 11:03 AM  
 Amylase 88 01/31/2012 03:12 PM  
 Lipase 157 10/08/2018 04:26 AM  
 Lactic acid 0.7 02/16/2016 06:16 AM  
 Troponin-I <0.05 01/31/2012 03:32 PM  
 Troponin-I, Qt. 0.05 12/31/2018 12:56 PM  
 
 
I reviewed recent labs and recent radiologic studies. I independently reviewed radiology images for studies I described above or studies I have ordered. Admission date (for inpatients): 4/11/2019 * No surgery found *  * No surgery found * ASSESSMENT/PLAN: 
Significant improvement since discharge from the hospital status post debridement on 10/7/2018 and VAC placement. She had PuraplyAM skin substitute graft on 11/1/2018. The VAC was maintained for 1 week without change. The wound is large and full-thickness. She was scheduled for 1 week follow-up after evaluation on 11/8. She became hospitalized and missed her follow-up appointments. She was hospitalized with respiratory issues and is on home oxygen as well as her chronic anticoagulation. She has developed some bloody drainage from the [facility]. The wound has improved dramatically with 100% granulation base. PuraplyAM #2 was placed on 12/6 and well tolerated. We discontinued the VAC since it had filled with blood. However, on 12/20 her wound had a very bad odor. The graft was somewhat soupy. The graft was intact. There appears to be an over abundance of granulation. We did not place another graft, but dressed with Hydrofera Blue ready.   The wound did calm down but did not require any silver nitrate. However the dressing has come down from her knee. We will need to immobilize her knee better and wrap at higher to maintain a good dressing. This will be critical to perform any grafting procedure without stabilization with a VAC. She missed 1/3 appointment due to being hospitalized for CHF exacerbation. We obtained a knee immobilizer. We will try for the next week to see stable dressing. I will not doing grafting procedures until we have stabilization of the wound and dressings. Unfortunately she did not tolerate the knee immobilizer. We will not pursue grafting but see if we can get this to heal from the [facility]. Wound is still somewhat stalled from 3 weeks ago. Though, there may be early epithelialization that is not well appreciated. We will continue to dress with Xeroform with a cover site to prevent dressing migration. I will see her back in 3 weeks. Problem List  Date Reviewed: 4/9/2019 Codes Class Noted Excessive granulation tissue ICD-10-CM: L92.9 ICD-9-CM: 701.5  1/24/2019 Severe obesity ([facility] ICD-10-CM: E66.01 
ICD-9-CM: 278.01  1/15/2019 Skin defect ICD-10-CM: L98.9 ICD-9-CM: 709.8  1/10/2019 Systolic CHF, acute on chronic (HCC) ICD-10-CM: S23.19 ICD-9-CM: 428.23, 428.0  12/31/2018 Acute on chronic combined systolic and diastolic CHF (congestive heart failure) (HCC) ICD-10-CM: I50.43 ICD-9-CM: 428.43, 428.0  12/31/2018 Acute exacerbation of CHF (congestive heart failure) (HCC) ICD-10-CM: I50.9 ICD-9-CM: 428.0  11/14/2018 Debility ICD-10-CM: R53.81 ICD-9-CM: 799.3  9/8/2018 Anemia (Chronic) ICD-10-CM: D64.9 ICD-9-CM: 285.9  9/7/2018 Chronic respiratory failure with hypoxia [facility]) ICD-10-CM: J96.11 
ICD-9-CM: 518.83, 799.02  9/7/2018 Encounter for immunization ICD-10-CM: N79 ICD-9-CM: V03.89  8/21/2018 Physical debility (Chronic) ICD-10-CM: R53.81 ICD-9-CM: 799.3  5/2/2018 Closed nondisplaced fracture of shaft of fifth metacarpal bone of left hand ICD-10-CM: D49.662G ICD-9-CM: 815.03  4/28/2018 Type 2 diabetes mellitus with nephropathy (HCC) (Chronic) ICD-10-CM: E11.21 
ICD-9-CM: 250.40, 583.81  12/18/2017 S/P AVR (aortic valve replacement) (Chronic) ICD-10-CM: Z95.2 ICD-9-CM: V43.3  Unknown Overview Signed 2/23/2016 11:04 AM by Zi Rice Mechanical/Artific Cardiomyopathy ([facility] (Chronic) ICD-10-CM: I42.9 ICD-9-CM: 425.4  Unknown Osteopenia ICD-10-CM: M85.80 ICD-9-CM: 733.90  Unknown HLD (hyperlipidemia) (Chronic) ICD-10-CM: K86.4 ICD-9-CM: 272.4  Unknown Osteoarthritis ICD-10-CM: M19.90 ICD-9-CM: 715.90  Unknown  
   
 ICD (implantable cardioverter-defibrillator) in place ICD-10-CM: Z95.810 ICD-9-CM: V45.02  10/2/2014 Overview Signed 10/2/2014  4:58 PM by Magdalena Chacon [employer] single-chamber ICD implantation 10/20/14 COPD (chronic obstructive pulmonary disease) (HCC) (Chronic) ICD-10-CM: J44.9 ICD-9-CM: 808  4/2/2014 Overview Signed 10/6/2018  8:56 PM by Chichi Jimenez NP  
  HOME OXYGEN 3 LITERS 
  
  
   
 REJI (obstructive sleep apnea)-cpap (Chronic) ICD-10-CM: G47.33 
ICD-9-CM: 327.23  4/2/2014 CKD (chronic kidney disease) stage 4, GFR 15-29 ml/min (HCC) ICD-10-CM: N18.4 ICD-9-CM: 585.4  7/10/2013 Anticoagulated on Coumadin (Chronic) ICD-10-CM: Z79.01 
ICD-9-CM: V58.61  7/9/2013 Overview Signed 7/9/2013  4:16 PM by Rasheed Christine NP  
  S/P AVR 
  
  
   
 CAD (coronary artery disease) (Chronic) ICD-10-CM: I25.10 ICD-9-CM: 414.00  1/20/2013 Overview Signed 5/20/2014 10:05 AM by Dileep Jimenez NP  
  5/8/14 PCI LAD with stent placed Chronic combined systolic and diastolic heart failure (HCC) (Chronic) ICD-10-CM: I50.42 
ICD-9-CM: 428.42  1/20/2013  Overview Addendum 4/28/2018  4:53 AM by Zoila Walters MD  
 5/8/14 ECHO:  EF 10-15% 12/2017:  EF 25-30% Essential hypertension, benign (Chronic) ICD-10-CM: I10 
ICD-9-CM: 401.1  1/20/2013 MDS (myelodysplastic syndrome) (HCC) (Chronic) ICD-10-CM: D46.9 ICD-9-CM: 238.75  12/17/2011 Overview Addendum 8/29/2013 11:06 AM by Gab Givens Procrit started in August, 2011 12/18/11 Procrit weekly and Iron stores. 5-12 12-13-12 good response to 3 weekly procrit did not need it last time 3-7-13 Pt doing well. Just wanted a \"check-up. \" Responding to Procrit every three weeks. 8-29-13 patient has missed some injections on recently restarted hemoglobin only issue , takes oral iron iron stores each time Iron deficiency anemia due to chronic blood loss (Chronic) ICD-10-CM: D50.0 ICD-9-CM: 280.0  7/29/2009 Active Problems: * No active hospital problems. * Any procedures done today in the wound center are documented in a separate note in connect care made part of this record by reference Wound Care Orders Placed This Encounter  WOUND CARE, DRESSING CHANGE Left anterior knee: 
Cleanse wound with normal saline. Apply Xeroform to base of wound and cover with Coversite. Change dressing 3 times per week. Return to wound center in 3 weeks. Standing Status:   Standing Number of Occurrences:   1 Follow-up Information Follow up With Specialties Details Why Contact Info [facility] Wound Care In 3 weeks  [phone] 
[phone] Signed:  Evy Roque MD,  FACS

## 2019-04-11 NOTE — WOUND CARE
04/11/19 1400 Wound Knee Anterior; Left Date First Assessed/Time First Assessed: 10/18/18 1505   POA: Yes  Wound Type: Trauma  Location: Knee  Wound Description (Optional): #1  Orientation: Anterior; Left Dressing Status  Clean, dry, and intact Dressing Type   
(xeroform, covrsite) Non-Pressure Injury Full thickness (subcut/muscle) Wound Length (cm) 2 cm Wound Width (cm) 0.6 cm Wound Depth (cm) 0.1 Wound Surface area (cm^2) 1.2 cm^2 Change in Wound Size % 97.87 Condition of Base Granulation Tissue Type Red;Pink Tissue Type Percent Pink 50 Tissue Type Percent Red 50 Drainage Amount  Scant Drainage Color Serosanguinous Wound Odor None Periwound Skin Condition Intact Cleansing and Cleansing Agents  Normal saline Procedure Tolerated Well Patient is currently taking ASA and coumadin.

## 2019-04-11 NOTE — WOUND CARE
[address] [place] [address] [address] Phone: [phone] Fax: [phone] Patient: Dioni Ulloa MRN: [id number]  SSN: [id number] YOB: 1950  Age: 68 y.o. Sex: female Return Appointment: 3 weeks with Arleen Lang MD 
 
Instructions:  
 
Left anterior knee: 
Cleanse wound with normal saline. Apply Xeroform to base of wound and cover with Coversite. Change dressing 3 times per week. Return to wound center in 3 weeks. Should you experience increased redness, swelling, pain, foul odor, size of wound(s), or have a temperature over 101 degrees please contact the [address] [place] [address] Road at [phone] or if after hours contact your primary care physician or go to the hospital emergency department. Signed By: Kinsey Blair RN April 11, 2019

## 2019-05-09 NOTE — PROGRESS NOTES
WOUND CENTER Consult Note/Progress Note:  
Anahy Ureña  MRN: [id number]  MARIUM:4[birth year]  Age:69 y.o. 
 
HPI: Anahy Ureña is a 68 y.o. female who returns to the wound center for continued follow-up of her left knee wound. She continues to dress with Xeroform. We have been seeing significant improvement though the last stages have been slow. She was last seen 3 weeks ago. She is a 77 y.o. female \"with extensive medical history as noted below on chronic coumadin who initially presented to ER 9/7 after fall in bathroom 8/28 where she landed directly on her left knee with complaints of dizziness and fatigue and found to have Hgb of 6.7.  She was transfused to Hgb of 9.6.  She also was found with a large hematoma/bullae over her left knee and INR of 4.3.  She underwent an I+D per Dr Farshad Brooke on 9/11 with a large amount of serosanguinous fluid evacuated. Gadiel Granger has been followed by home health PT, OT and wound care since discharge from rehab on ninth floor 9/11-9/21. Gadiel Granger has done well except the wound has not closed.  Two days ago, [facility] RN and daughter who is an RN noted mild erythema and heat surrounding upper half of wound.  Open area approx 5-6 cm in circumference.  No purulent drainage, oozing small amounts of dark blood with slightly foul odor.  Full ROM to knee.  All distal circulation, motion and sensation WNL for patient.  No distal edema, mild local edema.  Multiple old scars from prior lower extremity wounds.  Patient denies pain to area, fever, systemic symptoms.  She does not appear septic and lactic acid is 1.8.  WBC is 11.6 without shift.  Wound cultured in ER prior to administration of antibiotics.  Begun on vancomycin and ceftriaxone until cultures resulted. Randy Dumont, attentive family at bedside. Creatinine 1.48 on admission with baseline of 1.2.  Of note, patient reports diarrhea x 4 days without use of antibiotics, additional GI symptoms. \"   
  
  
 10/7/18: Pt sitting up on side of the bed just finishing breakfast. No complaints. AF, NAD. WBC 8.8, Pt taking Coumadin 2.5mg qhs. INR 3.0. 
10/8/18: POD1 s/p left knee debridement; awake in bed, no complaints. AF, VSS. Cx growing P. Mirabilis. 10/9/18: POD2 s/p left knee debridement; awake in chair, no complaints. AF, VSS. Cx growing P. Mirabilis. Vac placed today and functioning without leak 10/10/18: POD3 s/p left knee debridement; awake in chair, no complaints. AF, VSS. Cx growing P. Mirabilis. Vac functioning without leak or bleeding This information was obtained from the patient The following HPI elements were documented for the patient's wound: 
Location: L below knee anterior Duration: 8/28/2018 Severity:  No pain Context:  Fall in bathroom on coumadin, secondary infection Modifying Factors:  Nothing makes better or worse. Improved with debridement on 10/7/2018 and VAC Quality:  n/a Timing:  n/a Associated Signs and Symptoms: 
 
 
 
Past Medical History:  
Diagnosis Date  Anticoagulated on Coumadin 7/9/2013 S/P AVR  CAD (coronary artery disease) 1/20/2013 5/8/14 PCI LAD with stent placed  Cardiomyopathy ([address]  CHF (congestive heart failure) ([address] 2/17/2017  CKD (chronic kidney disease) stage 3, GFR 30-59 ml/min ([facility]) 7/10/2013  COPD (chronic obstructive pulmonary disease) ([address] 4/2/2014  Diabetes ([address]  Essential hypertension, benign 1/20/2013  HLD (hyperlipidemia)  ICD (implantable cardioverter-defibrillator) in place 10/2/2014 [employer] single-chamber ICD implantation 10/20/14  Iron deficiency anemia due to chronic blood loss 7/29/2009  MDS (myelodysplastic syndrome) ([address] 12/17/2011 Procrit started in August, 2011 12/18/11 Procrit weekly and Iron stores. 5-12 12-13-12 good response to 3 weekly procrit did not need it last time  3-7-13 Pt doing well. Just wanted a \"check-up. \" Responding to Procrit every three weeks. 13 patient has missed some injections on recently restarted hemoglobin only issue , takes oral iron iron stores each time  REJI (obstructive sleep apnea)-cpap 2014  Osteoarthritis  Osteopenia  Pulmonary hypertension ([facility] 6/15/2016  Quadrantanopia  Skin defect 2018  Visual complaint 2015 Past Surgical History:  
Procedure Laterality Date [address]    HX AORTIC VALVE REPLACEMENT  ,   
 mechanical valve   HX  SECTION    
 HX CORONARY STENT PLACEMENT  May, 2014 STEMI with Stent placement.  HX ENDOSCOPY  2009 EGD [address] CATHETERIZATION    HX PACEMAKER  10/2/2014 [employer] ICD  HX TUBAL LIGATION    
 IR BX BONE MARROW DIAGNOSTIC  2011 Current Outpatient Medications Medication Sig  furosemide (LASIX) 40 mg tablet TAKE 1 TABLET BY MOUTH EVERY DAY  calcium carbonate (CALCIUM 600 PO) Take 1 Tab by mouth daily.  vitamin E (AQUA GEMS) 400 unit capsule Take 400 Units by mouth daily.  traZODone (DESYREL) 50 mg tablet TAKE 1/2-1 TABS BY MOUTH NIGHTLY.  carvedilol (COREG) 6.25 mg tablet Take 1 Tab by mouth two (2) times daily (with meals).  traMADol (ULTRAM) 50 mg tablet Take 1 Tab by mouth every six (6) hours as needed for Pain. Max Daily Amount: 200 mg.  cholecalciferol, vitamin D3, (VITAMIN D3) 2,000 unit tab Take 2,000 Units by mouth daily.  warfarin (COUMADIN) 5 mg tablet Take 5 mg by mouth every Monday, Wednesday, Friday. Take as directed: 
take one 5mg tablet on Monday, Wednesday, Friday 
take 2.5mg (half of 5mg tablet) on all other days , Thurs, Sat, Sun  
 spironolactone (ALDACTONE) 25 mg tablet Take 25 mg by mouth daily.  nitroglycerin (NITROSTAT) 0.4 mg SL tablet 1 Tab by SubLINGual route every five (5) minutes as needed for Chest Pain.  sertraline (ZOLOFT) 50 mg tablet Take 1 Tab by mouth daily.  simvastatin (ZOCOR) 20 mg tablet Take 1 Tab by mouth nightly.  sacubitril-valsartan (ENTRESTO) 24 mg/26 mg tablet Take 1 Tab by mouth two (2) times a day.  fluticasone-vilanterol (BREO ELLIPTA) 200-25 mcg/dose inhaler Take 1 Puff by inhalation daily.  OTHER Trilogy  mometasone-formoterol (DULERA) 200-5 mcg/actuation HFA inhaler 2 puffs bid, rinse mouth after use. (Patient taking differently: Take 2 Puffs by inhalation two (2) times daily as needed.)  isosorbide mononitrate ER (IMDUR) 30 mg tablet Take 30 mg by mouth daily.  aspirin delayed-release 81 mg tablet Take 81 mg by mouth daily.  warfarin (COUMADIN) 2.5 mg tablet Take 2.5 mg by mouth nightly. Needs 5mg on Friday  docusate sodium (COLACE) 50 mg capsule Take 50 mg by mouth daily.  multivit-minerals/folic acid (ADULT ONE DAILY MULTIVITAMIN PO) Take 1 Tab by mouth daily.  ascorbic acid, vitamin C, (VITAMIN C) 500 mg tablet Take 500 mg by mouth daily.  ferrous gluconate 324 mg (38 mg iron) tablet TAKE 1 TAB BY MOUTH DAILY (BEFORE BREAKFAST). INDICATIONS: IRON DEFICIENCY ANEMIA (Patient taking differently: Take 325 mg by mouth two (2) times daily (with meals). )  fluticasone (FLONASE) 50 mcg/actuation nasal spray 2 Sprays by Both Nostrils route daily as needed. (Patient taking differently: 2 Sprays by Both Nostrils route daily as needed for Allergies.)  loratadine (CLARITIN) 10 mg tablet TAKE 1 TAB BY MOUTH DAILY. (Patient taking differently: TAKE 1 TAB BY MOUTH DAILY PRN)  albuterol (PROVENTIL VENTOLIN) 2.5 mg /3 mL (0.083 %) nebulizer solution 3 mL by Nebulization route every four (4) hours as needed for Wheezing.  albuterol (VENTOLIN HFA) 90 mcg/actuation inhaler 2 puffs qid prn (Patient taking differently: Take 2 Puffs by inhalation every six (6) hours as needed for Wheezing or Shortness of Breath.)  acetaminophen (TYLENOL) 325 mg tablet Take 650 mg by mouth every four (4) hours as needed for Pain.  OXYGEN-AIR DELIVERY SYSTEMS 3 L by Nasal route continuous. No current facility-administered medications for this encounter. ALLERGIES: Sulfa (sulfonamide antibiotics) and Aspirin Social History Socioeconomic History  Marital status:  Spouse name: Not on file  Number of children: Not on file  Years of education: Not on file  Highest education level: Not on file Occupational History Employer: RETIRED Comment: [employer] Tobacco Use  Smoking status: Former Smoker Packs/day: 0.25 Years: 42.00 Pack years: 10.50 Types: Cigarettes Start date: 10/1/1956 Last attempt to quit: 2014 Years since quittin.0  Smokeless tobacco: Never Used Substance and Sexual Activity  Alcohol use: No  
  Alcohol/week: 0.0 oz  Drug use: No  
Other Topics Concern  Weight Concern Yes  Special Diet Yes Social History Narrative Lives with her daughter. [facility] and [facility] are caregivers. Ambulates only short distances. Social History Tobacco Use Smoking Status Former Smoker  Packs/day: 0.25  
 Years: 42.00  
 Pack years: 10.50  Types: Cigarettes  Start date: 10/1/1956  Last attempt to quit: 2014  Years since quittin.0 Smokeless Tobacco Never Used Family History Problem Relation Age of Onset  Heart Disease Mother CHF  Hypertension Mother  Kidney Disease Mother  Heart Disease Father CHF  Lung Disease Father  Diabetes Father  Cancer Father   
     prostate  Hypertension Father  Heart Attack Father  Heart Disease Maternal Aunt  Diabetes Brother  Coronary Artery Disease Other  Breast Cancer Neg Hx   
 
 
ROS: The patient has no difficulty with chest pain or shortness of breath. No fever or chills. Comprehensive review of systems was otherwise unremarkable except as noted above. Physical Exam:  
Visit Vitals /64 (BP 1 Location: Left arm) Pulse 81 Temp 99.7 °F (37.6 °C) Resp 20 Ht 5' 2\" (1.575 m) Wt 206 lb 6.4 oz (93.6 kg) SpO2 93% BMI 37.75 kg/m² Constitutional: Alert, oriented, cooperative patient in no acute distress; appears stated age;  General appearance is within normal limits for wound care patient population. See the today's recorded vitals signs and constitutional data. Eyes: Pupils equal; Sclera are clear. EOMs intact; The eyes appear to track and move normally. The sclera are not injected. The conjunctive are clear. The eyelids are normal. There is no scleral icterus. ENMT: There are no obvious external ear, nose, lip or mouth lesions. Nares normal; Neck: Overall contour of the neck is normal with no obvious neck masses. Gross hearing is within normal limits. No obvious neck masses CV: RRR;  no JVD; No evidence of cyanosis of the upper extremities. The extremities are perfused without embolic sign, splinter hemorrhages, or petechia. Resp:  Breathing is non-labored; normal rate and effort; no audible wheezing. GI: soft and non-distended without acute abnormality noted. Musculoskeletal: unremarkable with normal function. No embolic signs or cyanosis. Neuro:  Oriented; moves all 4; no focal deficits Psychiatric: Judgement and insight are within normal limits for the wound care population of patients. Patient is oriented to person, place, and time. Recent and remote memory are within normal limits. Mood and affect are within normal limits. Integumentary (Skin/Wounds) See inspection of wound(s) below. There are no other skin areas of palpable concern. See wound center documentation including photos in the [address] Wound Template made part of this record by reference. Wounds: 
Wound Knee Anterior; Left (Active) Dressing Status  Clean, dry, and intact 5/9/2019  2:02 PM  
Dressing Type  Negative pressure wound therapy 12/6/2018  3:57 PM  
 Non-Pressure Injury Full thickness (subcut/muscle) 5/9/2019  2:02 PM  
Wound Length (cm) 0 cm 5/9/2019  2:02 PM  
Wound Width (cm) 0 cm 5/9/2019  2:02 PM  
Wound Depth (cm) 0 5/9/2019  2:02 PM  
Wound Surface area (cm^2) 0 cm^2 5/9/2019  2:02 PM  
Change in Wound Size % 100 5/9/2019  2:02 PM  
Condition of Base Epithelializing 5/9/2019  2:02 PM  
Condition of Edges Open 11/1/2018  1:43 PM  
Epithelialization (%) 100 5/9/2019  2:02 PM  
Tissue Type Red;Pink 4/11/2019  2:00 PM  
Tissue Type Percent Pink 50 4/11/2019  2:00 PM  
Tissue Type Percent Red 50 4/11/2019  2:00 PM  
Drainage Amount  None 5/9/2019  2:02 PM  
Drainage Color Serosanguinous 4/11/2019  2:00 PM  
Wound Odor None 5/9/2019  2:02 PM  
Periwound Skin Condition Intact 4/11/2019  2:00 PM  
Cleansing and Cleansing Agents  Normal saline 5/9/2019  2:02 PM  
Procedure Tolerated Well 5/9/2019  2:02 PM  
Number of days: 966 [REMOVED] Vacuum Assisted Closure (Removed) Number of days: 1 [REMOVED] Vacuum Assisted Closure Knee (Removed) Number of days: 77  
   
[REMOVED] Vacuum Assisted Closure Anterior; Left Knee (Removed) Number of days:   
   
[REMOVED] Wound Knee Left (Removed) Number of days: 29  
   
[REMOVED] Wound (Removed) Number of days: 6 [REMOVED] Wound Leg Lower Anterior; Inferior;Left;Lower (Removed) Number of days:   
  
 
 
 
 
 
 
 
 
 
 
 
 
Recent vitals (if inpt): 
Patient Vitals for the past 24 hrs: 
 BP Temp Pulse Resp SpO2 Height Weight 05/09/19 1349 116/64 99.7 °F (37.6 °C) 81 20 93 % 5' 2\" (1.575 m) 206 lb 6.4 oz (93.6 kg) Labs: 
No results for input(s): WBC, HGB, PLT, NA, K, CL, CO2, BUN, CREA, GLU, PTP, INR, APTT, TBIL, TBILI, CBIL, SGOT, GPT, ALT, AP, AML, LPSE, LCAD, NH4, TROPT, TROIQ, PCO2, PO2, HCO3, HGBEXT, PLTEXT, HGBEXT, PLTEXT in the last 72 hours. No lab exists for component:  PH, INREXT, INREXT Lab Results Component Value Date/Time  WBC 5.9 04/09/2019 11:03 AM  
 HGB 10.1 (L) 04/30/2019 10:58 AM  
 PLATELET 554 (L) 47/[birth year]/1934 11:03 AM  
 Sodium 143 04/09/2019 11:03 AM  
 Potassium 4.8 04/09/2019 11:03 AM  
 Chloride 106 04/09/2019 11:03 AM  
 CO2 34 (H) 04/09/2019 11:03 AM  
 BUN 52 (H) 04/09/2019 11:03 AM  
 Creatinine 1.79 (H) 04/09/2019 11:03 AM  
 Glucose 129 (H) 04/09/2019 11:03 AM  
 INR 1.7 01/02/2019 06:23 AM  
 INR, External 2.7 05/03/2019 INR POC 2.7 (A) 05/03/2019 12:10 PM  
 aPTT 60.2 (H) 10/10/2018 04:02 AM  
 Bilirubin, total 0.4 04/09/2019 11:03 AM  
 AST (SGOT) 12 (L) 04/09/2019 11:03 AM  
 ALT (SGPT) 15 04/09/2019 11:03 AM  
 Alk. phosphatase 61 04/09/2019 11:03 AM  
 Amylase 88 01/31/2012 03:12 PM  
 Lipase 157 10/08/2018 04:26 AM  
 Lactic acid 0.7 02/16/2016 06:16 AM  
 Troponin-I <0.05 01/31/2012 03:32 PM  
 Troponin-I, Qt. 0.05 12/31/2018 12:56 PM  
 
 
I reviewed recent labs and recent radiologic studies. I independently reviewed radiology images for studies I described above or studies I have ordered. Admission date (for inpatients): 5/9/2019 * No surgery found *  * No surgery found * ASSESSMENT/PLAN: 
Significant improvement since discharge from the hospital status post debridement on 10/7/2018 and VAC placement. She had PuraplyAM skin substitute graft on 11/1/2018. The VAC was maintained for 1 week without change. The wound is large and full-thickness. She was scheduled for 1 week follow-up after evaluation on 11/8. She became hospitalized and missed her follow-up appointments. She was hospitalized with respiratory issues and is on home oxygen as well as her chronic anticoagulation. She has developed some bloody drainage from the [facility]. The wound has improved dramatically with 100% granulation base. PuraplyAM #2 was placed on 12/6 and well tolerated. We discontinued the VAC since it had filled with blood. However, on 12/20 her wound had a very bad odor.   The graft was somewhat soupy. The graft was intact. There appears to be an over abundance of granulation. We did not place another graft, but dressed with Hydrofera Blue ready. The wound did calm down but did not require any silver nitrate. However the dressing has come down from her knee. We will need to immobilize her knee better and wrap at higher to maintain a good dressing. This will be critical to perform any grafting procedure without stabilization with a VAC. She missed 1/3 appointment due to being hospitalized for CHF exacerbation. We obtained a knee immobilizer. We will try for the next week to see stable dressing. I will not doing grafting procedures until we have stabilization of the wound and dressings. Unfortunately she did not tolerate the knee immobilizer. We will not pursue grafting but see if we can get this to heal from the [facility]. Wound appears to be closed. The epithelium appears fragile. There is some maceration from the Xeroform. We will promote some drying by dressing with Mepilex transfer and a cover site. I will see her back in 3 weeks. Problem List  Date Reviewed: 4/9/2019 Codes Class Noted Excessive granulation tissue ICD-10-CM: L92.9 ICD-9-CM: 701.5  1/24/2019 Severe obesity ([address] ICD-10-CM: E66.01 
ICD-9-CM: 278.01  1/15/2019 Skin defect ICD-10-CM: L98.9 ICD-9-CM: 709.8  1/10/2019 Systolic CHF, acute on chronic (HCC) ICD-10-CM: R93.51 ICD-9-CM: 428.23, 428.0  12/31/2018 Acute on chronic combined systolic and diastolic CHF (congestive heart failure) (HCC) ICD-10-CM: I50.43 ICD-9-CM: 428.43, 428.0  12/31/2018 Acute exacerbation of CHF (congestive heart failure) (HCC) ICD-10-CM: I50.9 ICD-9-CM: 428.0  11/14/2018 Debility ICD-10-CM: R53.81 ICD-9-CM: 799.3  9/8/2018 Anemia (Chronic) ICD-10-CM: D64.9 ICD-9-CM: 285.9  9/7/2018  Chronic respiratory failure with hypoxia (HCC) ICD-10-CM: J96.11 
 ICD-9-CM: 518.83, 799.02  9/7/2018 Encounter for immunization ICD-10-CM: C26 ICD-9-CM: V03.89  8/21/2018 Physical debility (Chronic) ICD-10-CM: R53.81 ICD-9-CM: 799.3  5/2/2018 Closed nondisplaced fracture of shaft of fifth metacarpal bone of left hand ICD-10-CM: W42.026M ICD-9-CM: 815.03  4/28/2018 Type 2 diabetes mellitus with nephropathy (HCC) (Chronic) ICD-10-CM: E11.21 
ICD-9-CM: 250.40, 583.81  12/18/2017 S/P AVR (aortic valve replacement) (Chronic) ICD-10-CM: Z95.2 ICD-9-CM: V43.3  Unknown Overview Signed 2/23/2016 11:04 AM by Rich Anthony Mechanical/Artific Cardiomyopathy ([facility] (Chronic) ICD-10-CM: I42.9 ICD-9-CM: 425.4  Unknown Osteopenia ICD-10-CM: M85.80 ICD-9-CM: 733.90  Unknown HLD (hyperlipidemia) (Chronic) ICD-10-CM: R63.8 ICD-9-CM: 272.4  Unknown Osteoarthritis ICD-10-CM: M19.90 ICD-9-CM: 715.90  Unknown  
   
 ICD (implantable cardioverter-defibrillator) in place ICD-10-CM: Z95.810 ICD-9-CM: V45.02  10/2/2014 Overview Signed 10/2/2014  4:58 PM by Miah Mitchellman [employer] single-chamber ICD implantation 10/20/14 COPD (chronic obstructive pulmonary disease) (HCC) (Chronic) ICD-10-CM: J44.9 ICD-9-CM: 796  4/2/2014 Overview Signed 10/6/2018  8:56 PM by Lorin Mireles NP  
  HOME OXYGEN 3 LITERS 
  
  
   
 REJI (obstructive sleep apnea)-cpap (Chronic) ICD-10-CM: G47.33 
ICD-9-CM: 327.23  4/2/2014 CKD (chronic kidney disease) stage 4, GFR 15-29 ml/min (HCC) ICD-10-CM: N18.4 ICD-9-CM: 585.4  7/10/2013 Anticoagulated on Coumadin (Chronic) ICD-10-CM: Z79.01 
ICD-9-CM: V58.61  7/9/2013 Overview Signed 7/9/2013  4:16 PM by Go Rutledge NP  
  S/P AVR 
  
  
   
 CAD (coronary artery disease) (Chronic) ICD-10-CM: I25.10 ICD-9-CM: 414.00  1/20/2013 Overview Signed 5/20/2014 10:05 AM by Phan Mcclain NP  
  5/8/14 PCI LAD with stent placed Chronic combined systolic and diastolic heart failure (HCC) (Chronic) ICD-10-CM: I50.42 
ICD-9-CM: 428.42  1/20/2013 Overview Addendum 4/28/2018  4:53 AM by Marva Avila MD  
  5/8/14 ECHO:  EF 10-15% 12/2017:  EF 25-30% Essential hypertension, benign (Chronic) ICD-10-CM: I10 
ICD-9-CM: 401.1  1/20/2013 MDS (myelodysplastic syndrome) (HCC) (Chronic) ICD-10-CM: D46.9 ICD-9-CM: 238.75  12/17/2011 Overview Addendum 8/29/2013 11:06 AM by Rich Arteaga Procrit started in August, 2011 12/18/11 Procrit weekly and Iron stores. 5-12 12-13-12 good response to 3 weekly procrit did not need it last time 3-7-13 Pt doing well. Just wanted a \"check-up. \" Responding to Procrit every three weeks. 8-29-13 patient has missed some injections on recently restarted hemoglobin only issue , takes oral iron iron stores each time Iron deficiency anemia due to chronic blood loss (Chronic) ICD-10-CM: D50.0 ICD-9-CM: 280.0  7/29/2009 Active Problems: * No active hospital problems. * Any procedures done today in the wound center are documented in a separate note in connect care made part of this record by reference Wound Care Orders Placed This Encounter  WOUND CARE, DRESSING CHANGE Left anterior knee Cleanse wound with normal saline. Cover with Mepilex transfer and secure with Coversite. Home health to change dressing 3 times per week. Return to wound center in 3 weeks. Standing Status:   Standing Number of Occurrences:   1 Follow-up Information Follow up With Specialties Details Why Contact Info [facility] WOUND CARE Wound Care In 3 weeks For wound re-check [address] [id number] [phone] Signed:  Garrett Patton MD,  FACS

## 2019-05-09 NOTE — WOUND CARE
[address] [place] [address], [facility] [address] Phone: [phone] Fax: [phone] Patient: Carlita Troncoso MRN: [id number]  SSN: [id number] YOB: 1950  Age: 68 y.o. Sex: female Return Appointment: 3 weeks with Navid Grover MD 
 
Instructions: Left anterior knee Cleanse wound with normal saline. Cover with Mepilex transfer and secure with Coversite. Home health to change dressing 3 times per week. Return to wound center in 3 weeks. Should you experience increased redness, swelling, pain, foul odor, size of wound(s), or have a temperature over 101 degrees please contact the [address] [place] [address] Road at [phone] or if after hours contact your primary care physician or go to the hospital emergency department. Signed By: Esther Lakhani, PT, [address] [facility] May 9, 2019

## 2019-05-29 NOTE — PROGRESS NOTES
05/09/19 1402 Wound Knee Anterior; Left Date First Assessed/Time First Assessed: 10/18/18 1505   POA: Yes  Wound Type: Trauma  Location: Knee  Wound Description (Optional): #1  Orientation: Anterior; Left Dressing Status  Clean, dry, and intact Dressing Type   
(Xeroform, Coversite) Non-Pressure Injury Full thickness (subcut/muscle) Wound Length (cm) 0 cm Wound Width (cm) 0 cm Wound Depth (cm) 0 Wound Surface area (cm^2) 0 cm^2 Change in Wound Size % 100 Condition of Base Epithelializing Epithelialization (%) 100 
(very fragile layer of skin) Drainage Amount  None Wound Odor None Cleansing and Cleansing Agents  Normal saline Dressing Type Applied (Mepiliex transfer, Coversite) Procedure Tolerated Well Patient is currently taking Coumadin and Aspirin 81 mg.

## 2019-06-06 PROBLEM — M25.511 RIGHT SHOULDER PAIN: Status: ACTIVE | Noted: 2019-01-01

## 2019-06-06 PROBLEM — E87.5 HYPERKALEMIA: Status: ACTIVE | Noted: 2019-01-01

## 2019-06-06 PROBLEM — D64.9 SYMPTOMATIC ANEMIA: Status: ACTIVE | Noted: 2019-01-01

## 2019-06-06 NOTE — ED PROVIDER NOTES
Patient is a 38-year-old female who arrives in the emergency department today via EMS from home complaining of generalized fatigue. She states she was too weak to get up this morning. She reports that she has missed her recent iron injections. She denies any chest pain or dyspnea. She is on chronic Coumadin therapy because of valve replacement and cardiac disease. She also has heart failure, chronic kidney disease, COPD, diabetes she denies any current bleeding, melena The history is provided by the patient and a relative. Fatigue This is a new problem. The current episode started more than 2 days ago. The problem has been gradually worsening. There was no focality noted. There has been no fever. Pertinent negatives include no shortness of breath, no chest pain, no vomiting and no nausea. There were no medications administered prior to arrival. Associated medical issues do not include trauma, seizures or CVA. Past Medical History:  
Diagnosis Date  Anticoagulated on Coumadin 7/9/2013 S/P AVR  CAD (coronary artery disease) 1/20/2013 5/8/14 PCI LAD with stent placed  Cardiomyopathy ([address]  CHF (congestive heart failure) ([address] 2/17/2017  CKD (chronic kidney disease) stage 3, GFR 30-59 ml/min ([facility]) 7/10/2013  COPD (chronic obstructive pulmonary disease) ([address] 4/2/2014  Diabetes ([address]  Essential hypertension, benign 1/20/2013  HLD (hyperlipidemia)  ICD (implantable cardioverter-defibrillator) in place 10/2/2014 Biotronik single-chamber ICD implantation 10/20/14  Iron deficiency anemia due to chronic blood loss 7/29/2009  MDS (myelodysplastic syndrome) ([address] 12/17/2011 Procrit started in August, 2011 12/18/11 Procrit weekly and Iron stores. 5-12 12-13-12 good response to 3 weekly procrit did not need it last time  3-7-13 Pt doing well. Just wanted a \"check-up. \" Responding to Procrit every three weeks.  8-29-13 patient has missed some injections on recently restarted hemoglobin only issue , takes oral iron iron stores each time  REJI (obstructive sleep apnea)-cpap 2014  Osteoarthritis  Osteopenia  Pulmonary hypertension ([address] 6/15/2016  Quadrantanopia  Skin defect 2018  Visual complaint 2015 Past Surgical History:  
Procedure Laterality Date [address]    HX AORTIC VALVE REPLACEMENT  ,   
 mechanical valve   HX  SECTION    
 HX CORONARY STENT PLACEMENT  May, 2014 STEMI with Stent placement.  HX ENDOSCOPY  2009 EGD [address] CATHETERIZATION    HX PACEMAKER  10/2/2014 Biotronik ICD  HX TUBAL LIGATION    
 IR BX BONE MARROW DIAGNOSTIC  2011 Family History:  
Problem Relation Age of Onset  Heart Disease Mother CHF  Hypertension Mother  Kidney Disease Mother  Heart Disease Father CHF  Lung Disease Father  Diabetes Father  Cancer Father   
     prostate  Hypertension Father  Heart Attack Father  Heart Disease Maternal Aunt  Diabetes Brother  Coronary Artery Disease Other  Breast Cancer Neg Hx Social History Socioeconomic History  Marital status:  Spouse name: Not on file  Number of children: Not on file  Years of education: Not on file  Highest education level: Not on file Occupational History Employer: RETIRED Comment: [employer] Social Needs  Financial resource strain: Not on file  Food insecurity:  
  Worry: Not on file Inability: Not on file  Transportation needs:  
  Medical: Not on file Non-medical: Not on file Tobacco Use  Smoking status: Former Smoker Packs/day: 0.25 Years: 42.00 Pack years: 10.50 Types: Cigarettes Start date: 10/1/1956 Last attempt to quit: 2014 Years since quittin.1  Smokeless tobacco: Never Used Substance and Sexual Activity  Alcohol use: No  
  Alcohol/week: 0.0 oz  Drug use: No  
 Sexual activity: Not on file Lifestyle  Physical activity:  
  Days per week: Not on file Minutes per session: Not on file  Stress: Not on file Relationships  Social connections:  
  Talks on phone: Not on file Gets together: Not on file Attends Yazidism service: Not on file Active member of club or organization: Not on file Attends meetings of clubs or organizations: Not on file Relationship status: Not on file  Intimate partner violence:  
  Fear of current or ex partner: Not on file Emotionally abused: Not on file Physically abused: Not on file Forced sexual activity: Not on file Other Topics Concern [address] Service Not Asked  Blood Transfusions Not Asked  Caffeine Concern Not Asked  Occupational Exposure Not Asked Judene Pill Hazards Not Asked  Sleep Concern Not Asked  Stress Concern Not Asked  Weight Concern Yes  Special Diet Yes  Back Care Not Asked  Exercise Not Asked  Bike Helmet Not Asked  Seat Belt Not Asked  Self-Exams Not Asked Social History Narrative Lives with her daughter. Adrienne Gibbs and Rupali Ornelas are caregivers. Ambulates only short distances. ALLERGIES: Sulfa (sulfonamide antibiotics) and Aspirin Review of Systems Constitutional: Positive for fatigue. Negative for chills and fever. HENT: Negative for congestion, rhinorrhea and sore throat. Eyes: Negative for pain, discharge and visual disturbance. Respiratory: Negative for cough and shortness of breath. Cardiovascular: Negative for chest pain and palpitations. Gastrointestinal: Negative for abdominal pain, diarrhea, nausea and vomiting. Endocrine: Negative for polydipsia and polyuria. Genitourinary: Negative for dysuria, frequency and urgency. Musculoskeletal: Negative for back pain and neck pain. Skin: Negative for rash. Neurological: Positive for weakness. Negative for seizures and syncope. Hematological: Negative. Vitals:  
 06/06/19 1454 BP: 127/80 Resp: 20 Temp: 98.2 °F (36.8 °C) SpO2: 92% Weight: 92.1 kg (203 lb) Height: 5' 2\" (1.575 m) Physical Exam  
Constitutional: She is oriented to person, place, and time. She appears well-developed and well-nourished. Chronically ill-appearing HENT:  
Head: Normocephalic and atraumatic. Eyes: Pupils are equal, round, and reactive to light. Conjunctivae and EOM are normal.  
Neck: Normal range of motion. Neck supple. Cardiovascular: Normal rate, regular rhythm and intact distal pulses. Pulmonary/Chest: Effort normal and breath sounds normal.  
Abdominal: Soft. There is no tenderness. There is no rebound and no guarding. Genitourinary: Rectal exam shows guaiac positive stool. Musculoskeletal: Normal range of motion. She exhibits no edema or deformity. Lymphadenopathy:  
  She has no cervical adenopathy. Neurological: She is alert and oriented to person, place, and time. She has normal strength. No cranial nerve deficit or sensory deficit. GCS eye subscore is 4. GCS verbal subscore is 5. GCS motor subscore is 6. Skin: Skin is warm and dry. No rash noted. Nursing note and vitals reviewed. MDM Number of Diagnoses or Management Options Diagnosis management comments: EKG reviewed by me shows normal sinus rhythm at a rate of 77 with left axis deviation and a left bundle branch block unchanged compared to previous 4:50 PM 
lab evaluation today shows a hemoglobin 7.1 with a normal baseline around 9 Chemistries show acute kidney injury with a creatinine up to 3.7 BUN elevated at 110 Potassium also elevated at 7.1 Rectal exam was done and is also Hemoccult positive With the anemia I have ordered a type and screen. Insulin, D50, and calcium for the hyperkalemia. Gentle IV fluids.   I called and consulted with the hospitalist service for admission and further management. Voice dictation software was used during the making of this note. This software is not perfect and grammatical and other typographical errors may be present. This note has been proofread, but may still contain errors. Lolita Hazel MD; 6/6/2019 @4:54 PM  
=================================================================== Amount and/or Complexity of Data Reviewed Clinical lab tests: ordered and reviewed Tests in the radiology section of CPT®: ordered and reviewed Obtain history from someone other than the patient: yes Review and summarize past medical records: yes Discuss the patient with other providers: yes Independent visualization of images, tracings, or specimens: yes Risk of Complications, Morbidity, and/or Mortality Presenting problems: moderate Diagnostic procedures: moderate Management options: moderate Patient Progress Patient progress: stable Procedures

## 2019-06-06 NOTE — ED TRIAGE NOTES
Pt arrived via EMS from home with c/o weakness and fatigue. Pt takes monthly Procrit shots but missed last month. Home health RN takes her each month but failed to take her last month per daughter. Pt also had a fall a week ago. Bruise noted to right temple. Daughter also states she has had a decreased appetite. /60 HR 75 Temp 98.1

## 2019-06-06 NOTE — H&P
Hospitalist H&P Note Admit Date:  2019  2:50 PM  
Name:  Valentine Causey Age:  68 y.o. 
:  1950 MRN:  [id number] PCP:  Kia Aviles MD 
Treatment Team: Primary Nurse: Rachell Adams RN 
 
HPI:  
Patient history was obtained from the ER provider prior to seeing the patient. Pt is a 67 yo female with PMH significant for myelodisplastic syndrome, DM2, CKD stage 4, HTN, sHF, HLD, COPD, AVR on chronic coumadin. Pt presented to the ED via EMS with report of fatigue. Pt reports being too weak to get up this am.  Pt also states that she has missed her recent iron injections. Pt initially denied any falls but did recall a fall which injured her right arm/shoulder. She denies fever, CP, sob. She denies any n/v/d/c. Pt denies seeing any blood in her stool. On admit it was found that pt hgb 7.1, K+ also 7.1, Cr 3.71, . Stool was FOB positive. CXR negative, EKG LBBB, axis deviation to the left. Bakari Teo ordered not yet obtained. Pt getting calcium gluconate, D50 and insulin - will then recheck potassium. 10 systems reviewed and negative except as noted in HPI. Past Medical History:  
Diagnosis Date  Anticoagulated on Coumadin 2013 S/P AVR  CAD (coronary artery disease) 2013 PCI LAD with stent placed  Cardiomyopathy ([address]  CHF (congestive heart failure) ([address] 2017  CKD (chronic kidney disease) stage 3, GFR 30-59 ml/min ([facility]) 7/10/2013  COPD (chronic obstructive pulmonary disease) ([address] 2014  Diabetes ([address]  Essential hypertension, benign 2013  HLD (hyperlipidemia)  ICD (implantable cardioverter-defibrillator) in place 10/2/2014 Biotronik single-chamber ICD implantation 10/20/14  Iron deficiency anemia due to chronic blood loss 2009  MDS (myelodysplastic syndrome) ([address] 2011 Procrit started in 11 Procrit weekly and Iron stores. 12 good response to 3 weekly procrit did not need it last time  3-7-13 Pt doing well. Just wanted a \"check-up. \" Responding to Procrit every three weeks. 13 patient has missed some injections on recently restarted hemoglobin only issue , takes oral iron iron stores each time  REJI (obstructive sleep apnea)-cpap 2014  Osteoarthritis  Osteopenia  Pulmonary hypertension ([address] 6/15/2016  Quadrantanopia  Skin defect 2018  Visual complaint 2015 Past Surgical History:  
Procedure Laterality Date [address]    HX AORTIC VALVE REPLACEMENT  ,   
 mechanical valve   HX  SECTION    
 HX CORONARY STENT PLACEMENT  May, 2014 STEMI with Stent placement.  HX ENDOSCOPY  2009 EGD [address] CATHETERIZATION    HX PACEMAKER  10/2/2014 Biotronik ICD  HX TUBAL LIGATION    
 IR BX BONE MARROW DIAGNOSTIC  2011 Allergies Allergen Reactions  Sulfa (Sulfonamide Antibiotics) Hives  Aspirin Nausea Only Cannot take uncoated aspirin Social History Tobacco Use  Smoking status: Former Smoker Packs/day: 0.25 Years: 42.00 Pack years: 10.50 Types: Cigarettes Start date: 10/1/1956 Last attempt to quit: 2014 Years since quittin.1  Smokeless tobacco: Never Used Substance Use Topics  Alcohol use: No  
  Alcohol/week: 0.0 oz Family History Problem Relation Age of Onset  Heart Disease Mother CHF  Hypertension Mother  Kidney Disease Mother  Heart Disease Father CHF  Lung Disease Father  Diabetes Father  Cancer Father   
     prostate  Hypertension Father  Heart Attack Father  Heart Disease Maternal Aunt  Diabetes Brother  Coronary Artery Disease Other  Breast Cancer Neg Hx Immunization History Administered Date(s) Administered  Influenza High Dose Vaccine PF 10/01/2016, 2017, 10/17/2018  Influenza Vaccine 2013, 10/01/2014, 2015  Pneumococcal Conjugate (PCV-13) 2015  Pneumococcal Vaccine (Unspecified Type) 2013  TB Skin Test (PPD) Intradermal 2014, 2018, 2018, 10/06/2018, 2018 PTA Medications: 
Prior to Admission Medications Prescriptions Last Dose Informant Patient Reported? Taking? OTHER   Yes No  
Sig: Trilogy OXYGEN-AIR DELIVERY SYSTEMS   Yes No  
Sig: 3 L by Nasal route continuous. acetaminophen (TYLENOL) 325 mg tablet   Yes No  
Sig: Take 650 mg by mouth every four (4) hours as needed for Pain. albuterol (PROVENTIL VENTOLIN) 2.5 mg /3 mL (0.083 %) nebulizer solution   No No  
Sig: 3 mL by Nebulization route every four (4) hours as needed for Wheezing. albuterol (VENTOLIN HFA) 90 mcg/actuation inhaler   No No  
Si puffs qid prn Patient taking differently: Take 2 Puffs by inhalation every six (6) hours as needed for Wheezing or Shortness of Breath. ascorbic acid, vitamin C, (VITAMIN C) 500 mg tablet   Yes No  
Sig: Take 500 mg by mouth daily. aspirin delayed-release 81 mg tablet   Yes No  
Sig: Take 81 mg by mouth daily. calcium carbonate (CALCIUM 600 PO)   Yes No  
Sig: Take 1 Tab by mouth daily. carvedilol (COREG) 6.25 mg tablet   No No  
Sig: Take 1 Tab by mouth two (2) times daily (with meals). cholecalciferol, vitamin D3, (VITAMIN D3) 2,000 unit tab   Yes No  
Sig: Take 2,000 Units by mouth daily. docusate sodium (COLACE) 50 mg capsule   Yes No  
Sig: Take 50 mg by mouth daily. ferrous gluconate 324 mg (38 mg iron) tablet   No No  
Sig: TAKE 1 TAB BY MOUTH DAILY (BEFORE BREAKFAST). INDICATIONS: IRON DEFICIENCY ANEMIA Patient taking differently: Take 325 mg by mouth two (2) times daily (with meals). fluticasone (FLONASE) 50 mcg/actuation nasal spray   No No  
Si Sprays by Both Nostrils route daily as needed. Patient taking differently: 2 Sprays by Both Nostrils route daily as needed for Allergies. fluticasone-vilanterol (BREO ELLIPTA) 200-25 mcg/dose inhaler   No No  
Sig: Take 1 Puff by inhalation daily. furosemide (LASIX) 40 mg tablet   No No  
Sig: TAKE 1 TABLET BY MOUTH EVERY DAY  
isosorbide mononitrate ER (IMDUR) 30 mg tablet   Yes No  
Sig: Take 30 mg by mouth daily. loratadine (CLARITIN) 10 mg tablet   No No  
Sig: TAKE 1 TAB BY MOUTH DAILY. Patient taking differently: TAKE 1 TAB BY MOUTH DAILY PRN  
mometasone-formoterol (DULERA) 200-5 mcg/actuation HFA inhaler   No No  
Si puffs bid, rinse mouth after use. Patient taking differently: Take 2 Puffs by inhalation two (2) times daily as needed for Cough.  
multivit-minerals/folic acid (ADULT ONE DAILY MULTIVITAMIN PO)   Yes No  
Sig: Take 1 Tab by mouth daily. nitroglycerin (NITROSTAT) 0.4 mg SL tablet   No No  
Si Tab by SubLINGual route every five (5) minutes as needed for Chest Pain. sacubitril-valsartan (ENTRESTO) 24 mg/26 mg tablet   No No  
Sig: Take 1 Tab by mouth two (2) times a day. sertraline (ZOLOFT) 50 mg tablet   No No  
Sig: Take 1 Tab by mouth daily. simvastatin (ZOCOR) 20 mg tablet   No No  
Sig: Take 1 Tab by mouth nightly. spironolactone (ALDACTONE) 25 mg tablet   Yes No  
Sig: Take 25 mg by mouth daily. traMADol (ULTRAM) 50 mg tablet   No No  
Sig: Take 1 Tab by mouth every six (6) hours as needed for Pain. Max Daily Amount: 200 mg.  
traZODone (DESYREL) 50 mg tablet   No No  
Sig: TAKE 1/2-1 TABS BY MOUTH NIGHTLY.  
vitamin E (AQUA GEMS) 400 unit capsule   Yes No  
Sig: Take 400 Units by mouth daily. warfarin (COUMADIN) 2.5 mg tablet   Yes No  
Sig: Take 2.5 mg by mouth nightly. Needs 5mg on Friday  
warfarin (COUMADIN) 5 mg tablet   Yes No  
Sig: Take 5 mg by mouth every Monday and Friday. 5 mg every Mon & Fri and then 2.5 mg all other days Facility-Administered Medications: None Objective: Patient Vitals for the past 24 hrs: 
 Temp Resp BP SpO2  
06/06/19 1454 98.2 °F (36.8 °C) 20 127/80 92 % Oxygen Therapy O2 Sat (%): 92 % (06/06/19 1454) O2 Device: Nasal cannula (06/06/19 1454) O2 Flow Rate (L/min): 4 l/min (06/06/19 1454) No intake or output data in the 24 hours ending 06/06/19 1746 Physical Exam: 
General:    Obese, Awake and alert. Eyes:   Normal sclera. Extraocular movements intact. ENT:  Normocephalic, atraumatic. Moist mucous membranes CV:   RRR. Audible click. Lungs:  CTAB. No wheezing, rhonchi, or rales. Abdomen: Soft, nontender, nondistended. Extremities: Warm and dry. No cyanosis or edema. Neurologic: CN II-XII grossly intact. Sensation intact. Skin:     No rashes or jaundice. Normal coloration Psych:  Normal mood and affect. I reviewed the labs, imaging, EKGs, telemetry, and other studies done this admission. Data Review:  
Recent Results (from the past 24 hour(s)) CBC WITH AUTOMATED DIFF Collection Time: 06/06/19  3:47 PM  
Result Value Ref Range WBC 7.4 4.3 - 11.1 K/uL  
 RBC 2.53 (L) 4.05 - 5.2 M/uL HGB 7.1 (L) 11.7 - 15.4 g/dL HCT 22.6 (L) 35.8 - 46.3 % MCV 89.3 79.6 - 97.8 FL  
 MCH 28.1 26.1 - 32.9 PG  
 MCHC 31.4 31.4 - 35.0 g/dL  
 RDW 15.7 (H) 11.9 - 14.6 % PLATELET 166 571 - 009 K/uL MPV 12.7 (H) 9.4 - 12.3 FL ABSOLUTE NRBC 0.00 0.0 - 0.2 K/uL  
 DF AUTOMATED NEUTROPHILS 76 43 - 78 % LYMPHOCYTES 16 13 - 44 % MONOCYTES 7 4.0 - 12.0 % EOSINOPHILS 1 0.5 - 7.8 % BASOPHILS 0 0.0 - 2.0 % IMMATURE GRANULOCYTES 0 0.0 - 5.0 %  
 ABS. NEUTROPHILS 5.6 1.7 - 8.2 K/UL  
 ABS. LYMPHOCYTES 1.2 0.5 - 4.6 K/UL  
 ABS. MONOCYTES 0.5 0.1 - 1.3 K/UL  
 ABS. EOSINOPHILS 0.1 0.0 - 0.8 K/UL  
 ABS. BASOPHILS 0.0 0.0 - 0.2 K/UL  
 ABS. IMM. GRANS. 0.0 0.0 - 0.5 K/UL METABOLIC PANEL, COMPREHENSIVE Collection Time: 06/06/19  3:47 PM  
Result Value Ref Range  Sodium 139 136 - 145 mmol/L  
 Potassium 7.1 (HH) 3.5 - 5.1 mmol/L Chloride 104 98 - 107 mmol/L  
 CO2 29 21 - 32 mmol/L Anion gap 6 (L) 7 - 16 mmol/L Glucose 94 65 - 100 mg/dL  (H) 8 - 23 MG/DL Creatinine 3.71 (H) 0.6 - 1.0 MG/DL  
 GFR est AA 15 (L) >60 ml/min/1.73m2 GFR est non-AA 13 (L) >60 ml/min/1.73m2 Calcium 9.4 8.3 - 10.4 MG/DL Bilirubin, total 0.6 0.2 - 1.1 MG/DL  
 ALT (SGPT) 19 12 - 65 U/L  
 AST (SGOT) 39 (H) 15 - 37 U/L Alk. phosphatase 56 50 - 136 U/L Protein, total 7.2 6.3 - 8.2 g/dL Albumin 3.5 3.2 - 4.6 g/dL Globulin 3.7 (H) 2.3 - 3.5 g/dL A-G Ratio 0.9 (L) 1.2 - 3.5 PROTHROMBIN TIME + INR Collection Time: 06/06/19  3:47 PM  
Result Value Ref Range Prothrombin time 35.3 (H) 11.7 - 14.5 sec INR 3.6 (HH)    
EKG, 12 LEAD, INITIAL Collection Time: 06/06/19  4:10 PM  
Result Value Ref Range Ventricular Rate 77 BPM  
 Atrial Rate 77 BPM  
 P-R Interval 164 ms QRS Duration 166 ms  
 Q-T Interval 442 ms QTC Calculation (Bezet) 500 ms Calculated P Axis 66 degrees Calculated R Axis -53 degrees Calculated T Axis 108 degrees Diagnosis    
  !! AGE AND GENDER SPECIFIC ECG ANALYSIS !! Sinus rhythm with occasional Premature ventricular complexes Left axis deviation Left bundle branch block Abnormal ECG When compared with ECG of 31-DEC-2018 12:52, 
QRS duration has increased T wave inversion less evident in Lateral leads QT has lengthened POC FECAL OCCULT BLOOD Collection Time: 06/06/19  4:51 PM  
Result Value Ref Range Occult blood, stool (POC) POSITIVE (A) NEG All Micro Results None Other Studies: Xr Chest [facility] Result Date: 6/6/2019 PA AND LATERAL CHEST X-RAY. Clinical Indication: Fatigue Comparison: Chest x-ray dated 12/31/2018 Findings: 2 views of the chest submitted demonstrate the cardiac silhouette and mediastinum to be unremarkable. An AICD is in place. Post-CABG changes are present.  There is no pleural effusion or pneumothorax. The lung parenchyma is clear. The included osseous structures are unremarkable. Impression: No acute cardiopulmonary abnormality. Assessment and Plan:  
 
Hospital Problems as of 6/6/2019 Date Reviewed: 4/9/2019 Codes Class Noted - Resolved POA * (Principal) Symptomatic anemia ICD-10-CM: D64.9 ICD-9-CM: 285.9  6/6/2019 - Present Yes Systolic CHF, acute on chronic (HCC) ICD-10-CM: Y11.96 ICD-9-CM: 428.23, 428.0  12/31/2018 - Present Yes Type 2 diabetes mellitus with nephropathy (HCC) (Chronic) ICD-10-CM: E11.21 
ICD-9-CM: 250.40, 583.81  12/18/2017 - Present Yes S/P AVR (aortic valve replacement) (Chronic) ICD-10-CM: Z95.2 ICD-9-CM: V43.3  Unknown - Present Yes Overview Signed 2/23/2016 11:04 AM by Jayshree Rios Mechanical/Artific HLD (hyperlipidemia) (Chronic) ICD-10-CM: T67.0 ICD-9-CM: 272.4  Unknown - Present Yes COPD (chronic obstructive pulmonary disease) (HCC) (Chronic) ICD-10-CM: J44.9 ICD-9-CM: 610  4/2/2014 - Present Yes Overview Signed 10/6/2018  8:56 PM by Tim Pearce NP  
  HOME OXYGEN 3 LITERS 
  
  
   
 CKD (chronic kidney disease) stage 4, GFR 15-29 ml/min (HCC) ICD-10-CM: N18.4 ICD-9-CM: 585.4  7/10/2013 - Present Yes Essential hypertension, benign (Chronic) ICD-10-CM: I10 
ICD-9-CM: 401.1  1/20/2013 - Present Yes  
   
 MDS (myelodysplastic syndrome) (HCC) (Chronic) ICD-10-CM: D46.9 ICD-9-CM: 238.75  12/17/2011 - Present Yes Overview Addendum 8/29/2013 11:06 AM by Juan Trejo Procrit started in August, 2011 12/18/11 Procrit weekly and Iron stores. 5-12  
 
 
 
12-13-12 good response to 3 weekly procrit did not need it last time 3-7-13 Pt doing well. Just wanted a \"check-up. \" Responding to Procrit every three weeks. 8-29-13 patient has missed some injections on recently restarted hemoglobin only issue , takes oral iron iron stores each time PLAN: 
Symptomatic Anemia 
-Guaiac positive 
-Transfuse 1 unit pRBC 
-Monitor CBC, recheck INR 
 
sCHF 
-Cautious IVF 
-Remote tele 
-Cardiology consult S/p AVR 
-As above HTN 
-Cont home meds except for [facility] HLD 
-Cont home med COPD 
-Neb 
-Oxygen to keep sat>90% CKD stage 4 
-Cautious IVF 
-Nephrology consult 
-Avoid nephrotoxins 
-Monitor Cr Hyperkalemia 
-Remote tele 
-IV calcium gluconate, d50, insulin 
-Monitor K+ Hx MDS 
-Heme/onc consult Right shoulder pain 
-Check shoulder and humerus xray Discharge planning:  Home DVT ppx:  SCDs Code status:  Full Decision Maker:  MPOA is daughter Jaylan Perdomo Admit status: Inpatient Case reviewed with supervising physician - COLETTE Monroe DO 
 
Estimated LOS:  Greater than 2 midnights Risk:  high Signed: 
Jillian Uriarte NP

## 2019-06-07 NOTE — CONSULTS
[facility] Subjective: A on CKD   Renal consult dictated # [id number] Review of Systems -  
General ROS: negative for - fever, chills Respiratory ROS: no SOB, cough, PRUETT Cardiovascular ROS: no CP, palpitations Gastrointestinal ROS: no N&V, abdominal pain, diarrhea Genito-Urinary ROS: no difficulty voiding, dysuria Neurological ROS: no seizures, focal weekness Objective: 
 
Vitals:  
 06/06/19 2345 06/07/19 0045 06/07/19 0145 06/07/19 6407 BP: 133/73 113/64 118/60 Pulse: 84 91 82 Resp: 14 16 16 Temp: 98 °F (36.7 °C) 98.3 °F (36.8 °C) 97.8 °F (36.6 °C) SpO2: 95% 100% 99% 98% Weight:      
Height:      
 
 
PE 
Gen: in no acute distress CV:reg rate Chest:clear Abd: soft Ext/Access: trace - 1+ edema Linda Jodee LAB Recent Labs  
  06/07/19 
0932 06/07/19 
0315 06/06/19 
1547 WBC  --  7.0 7.4 HGB 8.5* 8.7* 7.1*  
HCT 27.2* 27.5* 22.6* PLT  --  146* 166 INR  --  3.2 3.6* Recent Labs  
  06/07/19 
0315 06/06/19 
1909 06/06/19 
1547   --  139  
K 5.3* 4.9 7.1*  
*  --  104 CO2 29  --  29 *  --  94 *  --  116* CREA 3.35*  --  3.71* CA 9.7  --  9.4 ALB  --   --  3.5 TBILI  --   --  0.6 ALT  --   --  19 SGOT  --   --  39* Radiology A/P:  
Patient Active Problem List  
Diagnosis Code  Iron deficiency anemia due to chronic blood loss D50.0  MDS (myelodysplastic syndrome) ([facility]) D46.9  CAD (coronary artery disease) I25.10  Chronic combined systolic and diastolic heart failure ([facility]) I50.42  
 Essential hypertension, benign I10  
 Anticoagulated on Coumadin Z79.01  
 CKD (chronic kidney disease) stage 4, GFR 15-29 ml/min ([facility]) N18.4  COPD (chronic obstructive pulmonary disease) (HCC) J44.9  REJI (obstructive sleep apnea)-cpap G47.33  
 ICD (implantable cardioverter-defibrillator) in place Z95.810  
 Osteopenia M85.80  
 HLD (hyperlipidemia) E78.5  Osteoarthritis M19.90  
  S/P AVR (aortic valve replacement) Z95.2  Cardiomyopathy ([facility] I42.9  Type 2 diabetes mellitus with nephropathy ([facility]) E11.21  
 Closed nondisplaced fracture of shaft of fifth metacarpal bone of left hand S62.357A  Physical debility R53.81  
 Encounter for immunization Z23  Anemia D64.9  Chronic respiratory failure with hypoxia ([facility]) J96.11  
 Debility R53.81  
 Acute exacerbation of CHF (congestive heart failure) ([facility]) I50.9  Systolic CHF, acute on chronic ([facility]) I50.23  
 Acute on chronic combined systolic and diastolic CHF (congestive heart failure) ([facility]) I50.43  
 Skin defect L98.9  Severe obesity ([facility]) E66.01  
 Excessive granulation tissue L92.9  Symptomatic anemia D64.9  Hyperkalemia E87.5  Right shoulder pain M25.511 A on CKD - probably multifactorial including underlying CKD from DM/ HTN and probably a cardio- renal component. Will check urine studies and renal Ultrasound Hyperkalemai - multifactorial including GI bleed, aldactone, entresto and worsening CKD Anemia CHF Jemma Jade MD

## 2019-06-07 NOTE — CONSULTS
[facility] Inpatient Hematology / Oncology Consult Note Reason for Consult:  Symptomatic anemia [D64.9] Referring Physician:  Sabrina Castillo DO History of Present Illness: Ms. Hernan Beltran is a 68 y.o. female that came to ER 6/6/2019 with main complaint of increased weakness and fatigue also with history of fall at home last week. She has PMH of DM2, CKD stage 4, HTN, sHF, HLD, COPD, AVR on chronic coumadin, CHRIS and a presumed diagnosis of MDS. In ER Hgb 7.1 (BL 9-10), K+7.1, Creatinine 3.71 (BL 1.5-2.0), , INR 3.6, and stool + for blood. She received calcium gluconate, D5 and insulin and repeat K+ is 5.53. She is a known patient of Dr. Mariam Peterson with presumed diagnosis of MDS based on clinical symptoms. Bone marrow morphology failed to be definitive although could not be ruled out. She has been receiving q 3 weeks procrit injections but missed her May procrit. She also is on oral iron for CHRIS. We are consulted for recommendations. Review of Systems: 
Constitutional Denies fever, chills, weight loss, appetite changes. positive fatigue and weakness HEENT Denies trauma, blurry vision, hearing loss, ear pain, nosebleeds, sore throat, neck pain Skin Denies lesions or rashes. Lungs Denies dyspnea, cough, sputum production or hemoptysis. Cardiovascular Denies chest pain, palpitations, or lower extremity edema. Gastrointestinal Denies nausea, vomiting, changes in bowel habits, bloody or black stools, abdominal pain. Neuro Denies headaches, visual changes or ataxia. Denies dizziness, tingling, tremors, sensory change, speech change, focal weakness or headaches. MSK Denies back pain, arthralgias, myalgias. Recent fall at home. Psychiatric/Behavioral The patient is not nervous/anxious. Allergies Allergen Reactions  Sulfa (Sulfonamide Antibiotics) Hives  Aspirin Nausea Only Cannot take uncoated aspirin Past Medical History: Diagnosis Date  Anticoagulated on Coumadin 2013 S/P AVR  CAD (coronary artery disease) 2013 PCI LAD with stent placed  Cardiomyopathy ([facility] [address]  CHF (congestive heart failure) ([facility] [address] 2017  CKD (chronic kidney disease) stage 3, GFR 30-59 ml/min ([facility]) 7/10/2013  COPD (chronic obstructive pulmonary disease) ([facility] [address] 2014  Diabetes ([facility] [address]  Essential hypertension, benign 2013  HLD (hyperlipidemia)  ICD (implantable cardioverter-defibrillator) in place 10/2/2014 Bi[employer]ronik single-chamber ICD implantation 10/20/14  Iron deficiency anemia due to chronic blood loss 2009  MDS (myelodysplastic syndrome) ([facility] [address] 2011 Procrit started in 11 Procrit weekly and Iron stores. 12 good response to 3 weekly procrit did not need it last time  3- Pt doing well. Just wanted a \"check-up. \" Responding to Procrit every three weeks. 13 patient has missed some injections on recently restarted hemoglobin only issue , takes oral iron iron stores each time  REJI (obstructive sleep apnea)-cpap 2014  Osteoarthritis  Osteopenia  Pulmonary hypertension ([address] 6/15/2016  Quadrantanopia  Skin defect 2018  Visual complaint 2015 Past Surgical History:  
Procedure Laterality Date [address]    HX AORTIC VALVE REPLACEMENT  ,   
 mechanical valve   HX  SECTION    
 HX CORONARY STENT PLACEMENT  May, 2014 STEMI with Stent placement.  HX ENDOSCOPY  2009 EGD [address] CATHETERIZATION    HX PACEMAKER  10/2/2014 Bi[employer]ron[employer] ICD  HX TUBAL LIGATION    
 IR BX BONE MARROW DIAGNOSTIC  2011 Family History Problem Relation Age of Onset  Heart Disease Mother CHF  Hypertension Mother  Kidney Disease Mother  Heart Disease Father CHF  Lung Disease Father  Diabetes Father  Cancer Father   
     prostate  Hypertension Father  Heart Attack Father  Heart Disease Maternal Aunt  Diabetes Brother  Coronary Artery Disease Other  Breast Cancer Neg Hx Social History Socioeconomic History  Marital status:  Spouse name: Not on file  Number of children: Not on file  Years of education: Not on file  Highest education level: Not on file Occupational History Employer: RETIRED Comment: [employer] Social Needs  Financial resource strain: Not on file  Food insecurity:  
  Worry: Not on file Inability: Not on file  Transportation needs:  
  Medical: Not on file Non-medical: Not on file Tobacco Use  Smoking status: Former Smoker Packs/day: 0.25 Years: 42.00 Pack years: 10.50 Types: Cigarettes Start date: 10/1/1956 Last attempt to quit: 2014 Years since quittin.1  Smokeless tobacco: Never Used Substance and Sexual Activity  Alcohol use: No  
  Alcohol/week: 0.0 oz  Drug use: No  
 Sexual activity: Not on file Lifestyle  Physical activity:  
  Days per week: Not on file Minutes per session: Not on file  Stress: Not on file Relationships  Social connections:  
  Talks on phone: Not on file Gets together: Not on file Attends Voodoo service: Not on file Active member of club or organization: Not on file Attends meetings of clubs or organizations: Not on file Relationship status: Not on file  Intimate partner violence:  
  Fear of current or ex partner: Not on file Emotionally abused: Not on file Physically abused: Not on file Forced sexual activity: Not on file Other Topics Concern [address] Service Not Asked  Blood Transfusions Not Asked  Caffeine Concern Not Asked  Occupational Exposure Not Asked Candance Bragg Hazards Not Asked  Sleep Concern Not Asked  Stress Concern Not Asked  Weight Concern Yes  Special Diet Yes  Back Care Not Asked  Exercise Not Asked  Bike Helmet Not Asked  Seat Belt Not Asked  Self-Exams Not Asked Social History Narrative Lives with her daughter. Aaron Deutsch and Fahad Garcia are caregivers. Ambulates only short distances. Current Facility-Administered Medications Medication Dose Route Frequency Provider Last Rate Last Dose  albuterol (PROVENTIL VENTOLIN) nebulizer solution 2.5 mg  2.5 mg Nebulization Q4H PRN Belinda Irene, NP      
 carvedilol (COREG) tablet 6.25 mg  6.25 mg Oral BID WITH MEALS NaomyitBelinda, NP      
 isosorbide mononitrate ER (IMDUR) tablet 30 mg  30 mg Oral DAILY Balingit, Belinda, NP      
 sertraline (ZOLOFT) tablet 50 mg  50 mg Oral DAILY Chrisingit, Belinda, NP      
 simvastatin (ZOCOR) tablet 20 mg  20 mg Oral QHS Balingit, kah, NP   20 mg at 06/06/19 2250  traZODone (DESYREL) tablet 50 mg  50 mg Oral QHS Balingit, Revkah, NP   50 mg at 06/06/19 2250  vitamin E (AQUA GEMS) capsule 400 Units (Patient Supplied)  400 Units Oral DAILY Balingit, Belinda, NP      
 sodium chloride (NS) flush 5-40 mL  5-40 mL IntraVENous Q8H Balingit, Bernardah, NP   10 mL at 06/07/19 0542  sodium chloride (NS) flush 5-40 mL  5-40 mL IntraVENous PRN NaomyitBelinda, NP      
 acetaminophen (TYLENOL) tablet 650 mg  650 mg Oral Q4H PRN Chrisingit, Bernardah, NP      
 HYDROcodone-acetaminophen (NORCO) 5-325 mg per tablet 1 Tab  1 Tab Oral Q4H PRN NaomyitBelinda, NP      
 naloxone (NARCAN) injection 0.4 mg  0.4 mg IntraVENous PRN NaomyitBernardah, NP      
 ondansetron (ZOFRAN) injection 4 mg  4 mg IntraVENous Q4H PRN Chrisingit, kah, NP      
 bisacodyl (DULCOLAX) tablet 10 mg  10 mg Oral DAILY PRN NaomyitBelinda, NP      
 dextrose 5% and 0.9% NaCl infusion  75 mL/hr IntraVENous CONTINUOUS Balingit, Revkah, NP 75 mL/hr at 06/06/19 2100 75 mL/hr at 06/06/19 2100  
 0.9% sodium chloride infusion 250 mL  250 mL IntraVENous PRN Balingit, DOROTA Trevino      
 budesonide (PULMICORT) 500 mcg/2 ml nebulizer suspension  500 mcg Nebulization BID RT Belinda Irene NP   500 mcg at 19 0055 And  
 albuterol (PROVENTIL VENTOLIN) nebulizer solution 2.5 mg  2.5 mg Nebulization Q6HWA RT Belinda Irene NP   2.5 mg at 19 4076  diphenhydrAMINE (BENADRYL) capsule 25 mg  25 mg Oral Q4H PRN Belinda Irene, DOROTA      
 
 
OBJECTIVE: 
Patient Vitals for the past 8 hrs: 
 BP Temp Pulse Resp SpO2  
19 0805     98 % 19 0145 118/60 97.8 °F (36.6 °C) 82 16 99 % 19 0045 113/64 98.3 °F (36.8 °C) 91 16 100 % Temp (24hrs), Av °F (36.7 °C), Min:97.8 °F (36.6 °C), Max:98.3 °F (36.8 °C) No intake/output data recorded. Physical Exam: 
Constitutional: Well developed, well nourished female in no acute distress, sitting comfortably in the hospital bed. HEENT: Normocephalic and atraumatic. Oropharynx is clear, mucous membranes are moist. Extraocular muscles are intact. Sclerae anicteric. Skin Warm and dry. No bruising and no rash noted. No erythema. No pallor. Respiratory Lungs are clear to auscultation bilaterally without wheezes, rales or rhonchi, normal air exchange without accessory muscle use. CVS Normal rate, regular rhythm and normal S1 and S2. No murmurs, gallops, or rubs. Abdomen Soft, nontender and nondistended, normoactive bowel sounds. No palpable mass. No hepatosplenomegaly. Neuro Grossly nonfocal with no obvious sensory or motor deficits. MSK Normal range of motion in general.  No edema and no tenderness. Psych Appropriate mood and affect. Labs:   
Recent Results (from the past 24 hour(s)) CBC WITH AUTOMATED DIFF Collection Time: 19  3:47 PM  
Result Value Ref Range WBC 7.4 4.3 - 11.1 K/uL  
 RBC 2.53 (L) 4.05 - 5.2 M/uL HGB 7.1 (L) 11.7 - 15.4 g/dL HCT 22.6 (L) 35.8 - 46.3 % MCV 89.3 79.6 - 97.8 FL  
 MCH 28.1 26.1 - 32.9 PG  
 MCHC 31.4 31.4 - 35.0 g/dL RDW 15.7 (H) 11.9 - 14.6 % PLATELET 634 441 - 013 K/uL MPV 12.7 (H) 9.4 - 12.3 FL ABSOLUTE NRBC 0.00 0.0 - 0.2 K/uL  
 DF AUTOMATED NEUTROPHILS 76 43 - 78 % LYMPHOCYTES 16 13 - 44 % MONOCYTES 7 4.0 - 12.0 % EOSINOPHILS 1 0.5 - 7.8 % BASOPHILS 0 0.0 - 2.0 % IMMATURE GRANULOCYTES 0 0.0 - 5.0 %  
 ABS. NEUTROPHILS 5.6 1.7 - 8.2 K/UL  
 ABS. LYMPHOCYTES 1.2 0.5 - 4.6 K/UL  
 ABS. MONOCYTES 0.5 0.1 - 1.3 K/UL  
 ABS. EOSINOPHILS 0.1 0.0 - 0.8 K/UL  
 ABS. BASOPHILS 0.0 0.0 - 0.2 K/UL  
 ABS. IMM. GRANS. 0.0 0.0 - 0.5 K/UL METABOLIC PANEL, COMPREHENSIVE Collection Time: 06/06/19  3:47 PM  
Result Value Ref Range Sodium 139 136 - 145 mmol/L Potassium 7.1 (HH) 3.5 - 5.1 mmol/L Chloride 104 98 - 107 mmol/L  
 CO2 29 21 - 32 mmol/L Anion gap 6 (L) 7 - 16 mmol/L Glucose 94 65 - 100 mg/dL  (H) 8 - 23 MG/DL Creatinine 3.71 (H) 0.6 - 1.0 MG/DL  
 GFR est AA 15 (L) >60 ml/min/1.73m2 GFR est non-AA 13 (L) >60 ml/min/1.73m2 Calcium 9.4 8.3 - 10.4 MG/DL Bilirubin, total 0.6 0.2 - 1.1 MG/DL  
 ALT (SGPT) 19 12 - 65 U/L  
 AST (SGOT) 39 (H) 15 - 37 U/L Alk. phosphatase 56 50 - 136 U/L Protein, total 7.2 6.3 - 8.2 g/dL Albumin 3.5 3.2 - 4.6 g/dL Globulin 3.7 (H) 2.3 - 3.5 g/dL A-G Ratio 0.9 (L) 1.2 - 3.5 PROTHROMBIN TIME + INR Collection Time: 06/06/19  3:47 PM  
Result Value Ref Range Prothrombin time 35.3 (H) 11.7 - 14.5 sec INR 3.6 (HH)    
EKG, 12 LEAD, INITIAL Collection Time: 06/06/19  4:10 PM  
Result Value Ref Range Ventricular Rate 77 BPM  
 Atrial Rate 77 BPM  
 P-R Interval 164 ms QRS Duration 166 ms  
 Q-T Interval 442 ms QTC Calculation (Bezet) 500 ms Calculated P Axis 66 degrees Calculated R Axis -53 degrees Calculated T Axis 108 degrees Diagnosis    
  !! AGE AND GENDER SPECIFIC ECG ANALYSIS !! Sinus rhythm with occasional Premature ventricular complexes Left axis deviation Left bundle branch block Abnormal ECG When compared with ECG of 31-DEC-2018 12:52, 
QRS duration has increased T wave inversion less evident in Lateral leads QT has lengthened Confirmed by Ganesh Lomax MD (), Sheilda Alpers ([id number]) on 6/7/2019 7:19:41 AM 
  
TYPE & SCREEN Collection Time: 06/06/19  4:37 PM  
Result Value Ref Range Crossmatch Expiration 06/09/2019 ABO/Rh(D) A POSITIVE Antibody screen NEG Unit number [id number] Blood component type  LRIR Unit division 00 Status of unit TRANSFUSED Crossmatch result Compatible POC FECAL OCCULT BLOOD Collection Time: 06/06/19  4:51 PM  
Result Value Ref Range Occult blood, stool (POC) POSITIVE (A) NEG    
POTASSIUM Collection Time: 06/06/19  7:09 PM  
Result Value Ref Range Potassium 4.9 3.5 - 5.1 mmol/L METABOLIC PANEL, BASIC Collection Time: 06/07/19  3:15 AM  
Result Value Ref Range Sodium 144 136 - 145 mmol/L Potassium 5.3 (H) 3.5 - 5.1 mmol/L Chloride 108 (H) 98 - 107 mmol/L  
 CO2 29 21 - 32 mmol/L Anion gap 7 7 - 16 mmol/L Glucose 124 (H) 65 - 100 mg/dL  (H) 8 - 23 MG/DL Creatinine 3.35 (H) 0.6 - 1.0 MG/DL  
 GFR est AA 17 (L) >60 ml/min/1.73m2 GFR est non-AA 14 (L) >60 ml/min/1.73m2 Calcium 9.7 8.3 - 10.4 MG/DL  
CBC WITH AUTOMATED DIFF Collection Time: 06/07/19  3:15 AM  
Result Value Ref Range WBC 7.0 4.3 - 11.1 K/uL  
 RBC 3.08 (L) 4.05 - 5.2 M/uL HGB 8.7 (L) 11.7 - 15.4 g/dL HCT 27.5 (L) 35.8 - 46.3 % MCV 89.3 79.6 - 97.8 FL  
 MCH 28.2 26.1 - 32.9 PG  
 MCHC 31.6 31.4 - 35.0 g/dL  
 RDW 14.9 (H) 11.9 - 14.6 % PLATELET 442 (L) 812 - 450 K/uL MPV 12.3 9.4 - 12.3 FL ABSOLUTE NRBC 0.00 0.0 - 0.2 K/uL  
 DF AUTOMATED NEUTROPHILS 86 (H) 43 - 78 % LYMPHOCYTES 9 (L) 13 - 44 % MONOCYTES 4 4.0 - 12.0 % EOSINOPHILS 1 0.5 - 7.8 % BASOPHILS 0 0.0 - 2.0 % IMMATURE GRANULOCYTES 0 0.0 - 5.0 %  
 ABS. NEUTROPHILS 6.1 1.7 - 8.2 K/UL ABS. LYMPHOCYTES 0.6 0.5 - 4.6 K/UL  
 ABS. MONOCYTES 0.3 0.1 - 1.3 K/UL  
 ABS. EOSINOPHILS 0.1 0.0 - 0.8 K/UL  
 ABS. BASOPHILS 0.0 0.0 - 0.2 K/UL  
 ABS. IMM. GRANS. 0.0 0.0 - 0.5 K/UL PROTHROMBIN TIME + INR Collection Time: 06/07/19  3:15 AM  
Result Value Ref Range Prothrombin time 32.3 (H) 11.7 - 14.5 sec INR 3.2 ASSESSMENT: 
Problem List  Date Reviewed: 4/9/2019 Codes Class Noted * (Principal) Symptomatic anemia ICD-10-CM: D64.9 ICD-9-CM: 285.9  6/6/2019 Hyperkalemia ICD-10-CM: E87.5 ICD-9-CM: 276.7  6/6/2019 Right shoulder pain ICD-10-CM: M25.511 ICD-9-CM: 719.41  6/6/2019 Excessive granulation tissue ICD-10-CM: L92.9 ICD-9-CM: 701.5  1/24/2019 Severe obesity ([facility] [address] ICD-10-CM: E66.01 
ICD-9-CM: 278.01  1/15/2019 Skin defect ICD-10-CM: L98.9 ICD-9-CM: 709.8  1/10/2019 Systolic CHF, acute on chronic (HCC) ICD-10-CM: C98.28 ICD-9-CM: 428.23, 428.0  12/31/2018 Acute on chronic combined systolic and diastolic CHF (congestive heart failure) (HCC) ICD-10-CM: I50.43 ICD-9-CM: 428.43, 428.0  12/31/2018 Acute exacerbation of CHF (congestive heart failure) (HCC) ICD-10-CM: I50.9 ICD-9-CM: 428.0  11/14/2018 Debility ICD-10-CM: R53.81 ICD-9-CM: 799.3  9/8/2018 Anemia (Chronic) ICD-10-CM: D64.9 ICD-9-CM: 285.9  9/7/2018 Chronic respiratory failure with hypoxia [facility]) ICD-10-CM: J96.11 
ICD-9-CM: 518.83, 799.02  9/7/2018 Encounter for immunization ICD-10-CM: J74 ICD-9-CM: V03.89  8/21/2018 Physical debility (Chronic) ICD-10-CM: R53.81 ICD-9-CM: 799.3  5/2/2018 Closed nondisplaced fracture of shaft of fifth metacarpal bone of left hand ICD-10-CM: U98.945I ICD-9-CM: 815.03  4/28/2018 Type 2 diabetes mellitus with nephropathy (HCC) (Chronic) ICD-10-CM: E11.21 
ICD-9-CM: 250.40, 583.81  12/18/2017 S/P AVR (aortic valve replacement) (Chronic) ICD-10-CM: Z95.2 ICD-9-CM: V43.3  Unknown Overview Signed 2/23/2016 11:04 AM by Beula Frankel Mechanical/Artific Cardiomyopathy ([address] (Chronic) ICD-10-CM: I42.9 ICD-9-CM: 425.4  Unknown Osteopenia ICD-10-CM: M85.80 ICD-9-CM: 733.90  Unknown HLD (hyperlipidemia) (Chronic) ICD-10-CM: V29.1 ICD-9-CM: 272.4  Unknown Osteoarthritis ICD-10-CM: M19.90 ICD-9-CM: 715.90  Unknown  
   
 ICD (implantable cardioverter-defibrillator) in place ICD-10-CM: Z95.810 ICD-9-CM: V45.02  10/2/2014 Overview Signed 10/2/2014  4:58 PM by Raúl Ojeda Bi[employer]ronik single-chamber ICD implantation 10/20/14 COPD (chronic obstructive pulmonary disease) (HCC) (Chronic) ICD-10-CM: J44.9 ICD-9-CM: 275  4/2/2014 Overview Signed 10/6/2018  8:56 PM by Margo Moseley NP  
  HOME OXYGEN 3 LITERS 
  
  
   
 REJI (obstructive sleep apnea)-cpap (Chronic) ICD-10-CM: G47.33 
ICD-9-CM: 327.23  4/2/2014 CKD (chronic kidney disease) stage 4, GFR 15-29 ml/min (HCC) ICD-10-CM: N18.4 ICD-9-CM: 585.4  7/10/2013 Anticoagulated on Coumadin (Chronic) ICD-10-CM: Z79.01 
ICD-9-CM: V58.61  7/9/2013 Overview Signed 7/9/2013  4:16 PM by Heather Newton NP  
  S/P AVR 
  
  
   
 CAD (coronary artery disease) (Chronic) ICD-10-CM: I25.10 ICD-9-CM: 414.00  1/20/2013 Overview Signed 5/20/2014 10:05 AM by Petey High NP  
  5/8/14 PCI LAD with stent placed Chronic combined systolic and diastolic heart failure (HCC) (Chronic) ICD-10-CM: I50.42 
ICD-9-CM: 428.42  1/20/2013 Overview Addendum 4/28/2018  4:53 AM by Cole Alvarez MD  
  5/8/14 ECHO:  EF 10-15% 12/2017:  EF 25-30% Essential hypertension, benign (Chronic) ICD-10-CM: I10 
ICD-9-CM: 401.1  1/20/2013 MDS (myelodysplastic syndrome) (HCC) (Chronic) ICD-10-CM: D46.9 ICD-9-CM: 238.75  12/17/2011 Overview Addendum 8/29/2013 11:06 AM by Oralia Groves started in August, 2011 12/18/11 Procrit weekly and Iron stores. 5-12 12-13-12 good response to 3 weekly procrit did not need it last time 3-7-13 Pt doing well. Just wanted a \"check-up. \" Responding to Procrit every three weeks. 8-29-13 patient has missed some injections on recently restarted hemoglobin only issue , takes oral iron iron stores each time Iron deficiency anemia due to chronic blood loss (Chronic) ICD-10-CM: D50.0 ICD-9-CM: 280.0  7/29/2009 RECOMMENDATIONS: 
Anemia likely multifactorial including presumed MDS, iron deficiency related, as well as AOCD/CKD related - Wll proceed w/ scheduled procrit dose inhouse (gets this every 3 weeks). Iron stores borderline: iv iron x 1 (case d/w patient and daughter) - GI following (stools +ve for bleeding in setting of supra therapeutic INR). - F/u in Hematology 2-3 weeks from discharge. Lab studies and imaging studies were personally reviewed. Pertinent old records were reviewed. Thank you for allowing us to participate in the care of Ms. Corral. We will follow along. [facility] [id number] [facility] [address] Office : [phone] Fax : [phone]

## 2019-06-07 NOTE — PROGRESS NOTES
Placed patient on her home BiPAP machine. Nurse was notified and asked to look at the patient's infusion line to make sure mask strap was not too tight.

## 2019-06-07 NOTE — CONSULTS
[address] [place] [address] 
CONSULTATION Name:  Grabiel Chen 
MR#:  [id number] :  1950 ACCOUNT #:  [de-identified] DATE OF SERVICE:  2019 REASON FOR CONSULTATION:  We are seeing the patient at the request of Dr. Lulú Parks with regards to chronic kidney disease. HISTORY OF PRESENT ILLNESS:  The patient is a 64-year-old -American female who presented to [facility] on 2019 with progressive weakness. She was too weak to get up out of bed. She was evaluated in the emergency room and found to have a hemoglobin of 7.1 and potassium of 7.1 with a BUN of 116 and a creatinine of 3.71. Her fecal occult blood tests were positive. She has been admitted to the hospital now and transfused. Her hyperkalemia was treated medically with insulin, glucose and calcium, but as far as I could tell, she did not get Kayexalate or Veltassa. With that, her potassium has come down into the normal range. She has been admitted to the hospital now for further evaluation and treatment. We have been asked to see her now with regards to her renal insufficiency. The patient has no knowledge of having any chronic kidney disease. Review of records indicate that she has had abnormal kidney function dating back to  when her creatinine was 1.8. It has been hovering in 1.2-2.2 range since then. On 2019, she had a creatinine of 1.79. On presentation yesterday, her sodium was 139, potassium 7.1, chloride 104, CO2 was 29, blood sugar 94, , creatinine 3.71. This morning, she has a sodium of 144, potassium 5.3, chloride 108, CO2 is 29, anion gap is 7, glucose is 124, BUN is 112, creatinine is 3.35 with a calcium of 9.7. Her urinalysis back in 2019 showed a urine specific gravity 1.010 with only 1+ protein being present.   A urine microalbumin test done back in 2016 was elevated at 471 and a renal ultrasound done back in  showed normal kidneys bilaterally, right measuring 10.1 and left measuring 10.9 cm in length. PAST MEDICAL HISTORY:  Significant for atherosclerotic coronary artery disease status post PCI with stent. She is status post aortic valve replacement and is on chronic Coumadin therapy. She has cardiomyopathy with systolic and diastolic dysfunction with an EF of 25%. She has COPD, type 2 diabetes mellitus, chronic hypertension, hyperlipidemia. She has myelodysplastic syndrome and has been followed by Oncology for this and has been on Procrit. She has obstructive sleep apnea on CPAP, osteoarthritis and pulmonary hypertension. MEDICATIONS PRIOR TO ADMISSION:  Her current medications include Pulmicort nebulizer twice a day, Ventolin nebulizer q.6 hours, Coreg 6.25 mg twice a day, Imdur 30 mg p.o. daily, Zoloft 50 mg p.o. daily, Zocor 20 p.o. at bedtime, Desyrel 50 mg p.o. at bedtime, and vitamin E 400 units p.o. daily. ALLERGIES:  HER ONLY KNOWN DRUG ALLERGIES ARE TO SULFA WHICH CAUSES HIVES AND ASPIRIN WHICH CAUSES HER NAUSEA. SOCIAL HISTORY:  She is single. She lives alone. She is a former smoker, does not drink any alcohol. FAMILY HISTORY:  Negative for any kidney disease. REVIEW OF SYSTEMS: 
CONSTITUTIONAL:  She denies any fevers or chills. No headaches, dizzy spells, fainting spells. RESPIRATORY:  No shortness of breath, cough, wheezing. CARDIOVASCULAR:  No chest pain or palpitations. GASTROINTESTINAL:  No nausea, vomiting, heartburn, or abdominal pain. No constipation or diarrhea. GENITOURINARY:  No dysuria or polyuria. PHYSICAL EXAMINATION: 
GENERAL:  Reveals an elderly -American female, in no acute distress. VITAL SIGNS:  She is afebrile. Blood pressure is 118/60; pulse is 82; respirations are 16, they are not labored. HEENT:  Her head is normocephalic. Eyes:  Pupils are equal and reactive to light and accommodation. Extraocular muscles were intact. Fundi were not visualized. LUNGS:  Clear. No rales or wheezes heard. Breath sounds equal bilaterally. HEART:  Regular rate and rhythm. ABDOMEN:  Soft. Bowel sounds are present. There is no hepatosplenomegaly. GENITAL/RECTAL:  Deferred. EXTREMITIES:  She has trace to may be 1+ leg edema bilaterally. IMPRESSION: 
1. Acute-on-chronic kidney disease, probably multifactorial.  She may have some degree of underlying diabetic and hypertensive nephrosclerosis as shown by the elevated microalbumin test done several years ago. Her current azotemia though probably is multifactorial including a cardiorenal component on top of that in addition if she is having occult gastrointestinal bleeding that may contribute to the markedly elevated BUN versus creatinine ratio. 2.  Hyperkalemia appears to be resolving. 3.  Atherosclerotic coronary artery disease, status post stenting. 4.  Systolic and diastolic congestive heart failure. 5.  Status post aortic valve replacement, on Coumadin. 6.  History of myelodysplastic syndrome. 7.  Type 2 diabetes mellitus. 8.  Chronic hypertension. PLANS AND RECOMMENDATIONS:  For now, I agree with discontinuing the Aldactone and the Entresto that she was on preadmission. These may have contributed to her hyperkalemia. I will get a repeat renal ultrasound and we will get urine chemistries and check serial labs. Hopefully, as we tried to improve her cardiac function that her renal function will return back to baseline with a creatinine of around 2. Thank you very much for allowing us to see her and helping in her care. Camron Eid MD 
 
 
[id number] 
D:  06/07/2019 9:50 
T:  06/07/2019 9:59 
JOB #:  [id number] CC: DO Shonda Staton MD Rosalynd Life, MD

## 2019-06-07 NOTE — H&P (VIEW-ONLY)
[facility] Consult Note      
consulted GI Physician: Dr. Sam Moore Referring Provider:  July Abraham NP Consult Date:  6/7/2019 Admit Date:  6/6/2019 Chief Complaint:  GIB Subjective:  
 
History of Present Illness:  Patient is a 68 y.o. female with PMH of CKD st IV, COPD on trilogy at night and 3L O2 durign day, myelodysplastic syndrome w anemia, htn, hld, and CAD w cardiac arrest 5-2014 w LHC and PCI to LAD, cardiomyopathy s/p mech AVR (on coumadin) w echo  w EF 25% w severe diffuse hypokinesis (worse apical and septal) w mech AVR and mild MR- s/p Biotronik ICD, who presented to the ED with complaints of frequent falls and weakness. She is seen in consultation at the request of July Abraham NP for GIB. Patient reports no prior GI evaluation. There is no known family hx of Gi malignancy. She has had normal to hard stools without significant constipation and no hx of persistent diarrhea. She denies Hematochezia or melena and denies N/V or symptoms of reflux. Apparently there has been heme-positive stool here. Labs: h/h 7.1/22, MCV 89.3, platelet 847J,  K 7.1, INR 3.6,  creatinine 3.71 and . CXR negative. Xray of shoulder negative. EKG showed SR with PACs and LBBB. She received 1 unit of PRBCs overnight and Hgb this am is 8.7. INR this am is 3.2. Additional anemia evaluation reveals:  iron 78, folate 36, B12 963, TIBC 239, saturation 33%, jhon 81.  
  
 
 
 
PMH: 
Past Medical History:  
Diagnosis Date  Anticoagulated on Coumadin 7/9/2013 S/P AVR  CAD (coronary artery disease) 1/20/2013 5/8/14 PCI LAD with stent placed  Cardiomyopathy [facility] [address]  CHF (congestive heart failure) ([facility] [address] 2/17/2017  CKD (chronic kidney disease) stage 3, GFR 30-59 ml/min ([facility]) 7/10/2013  COPD (chronic obstructive pulmonary disease) ([facility] [address] 4/2/2014  Diabetes ([facility] [address]  Essential hypertension, benign 1/20/2013  HLD (hyperlipidemia)  ICD (implantable cardioverter-defibrillator) in place 10/2/2014 Biotronik single-chamber ICD implantation 10/20/14  Iron deficiency anemia due to chronic blood loss 2009  MDS (myelodysplastic syndrome) ([facility] [address] 2011 Procrit started in 11 Procrit weekly and Iron stores. 12 good response to 3 weekly procrit did not need it last time  3-7-13 Pt doing well. Just wanted a \"check-up. \" Responding to Procrit every three weeks. 13 patient has missed some injections on recently restarted hemoglobin only issue , takes oral iron iron stores each time  REJI (obstructive sleep apnea)-cpap 2014  Osteoarthritis  Osteopenia  Pulmonary hypertension ([facility] [address] 6/15/2016  Quadrantanopia  Skin defect 2018  Visual complaint 2015 PSH: 
Past Surgical History:  
Procedure Laterality Date [address]    HX AORTIC VALVE REPLACEMENT  ,   
 mechanical valve   HX  SECTION    
 HX CORONARY STENT PLACEMENT  May, 2014 STEMI with Stent placement.  HX ENDOSCOPY  2009 EGD [address] CATHETERIZATION    HX PACEMAKER  10/2/2014 Biotronik ICD  HX TUBAL LIGATION    
 IR BX BONE MARROW DIAGNOSTIC  2011 Allergies: Allergies Allergen Reactions  Sulfa (Sulfonamide Antibiotics) Hives  Aspirin Nausea Only Cannot take uncoated aspirin Home Medications: 
Prior to Admission medications Medication Sig Start Date End Date Taking? Authorizing Provider  
furosemide (LASIX) 40 mg tablet TAKE 1 TABLET BY MOUTH EVERY DAY 19   Felisha Maldonado MD  
calcium carbonate (CALCIUM 600 PO) Take 1 Tab by mouth daily. Provider, Historical  
vitamin E (AQUA GEMS) 400 unit capsule Take 400 Units by mouth daily. Provider, Historical  
traZODone (DESYREL) 50 mg tablet TAKE 1/2-1 TABS BY MOUTH NIGHTLY.  3/19/19   Shyam Villatoro MD  
 carvedilol (COREG) 6.25 mg tablet Take 1 Tab by mouth two (2) times daily (with meals). 1/21/19   Siena GOULD MD  
traMADol Alver Galas) 50 mg tablet Take 1 Tab by mouth every six (6) hours as needed for Pain. Max Daily Amount: 200 mg. 12/27/18   Reuben James MD  
cholecalciferol, vitamin D3, (VITAMIN D3) 2,000 unit tab Take 2,000 Units by mouth daily. Provider, Historical  
warfarin (COUMADIN) 5 mg tablet Take 5 mg by mouth every Monday and Friday. 5 mg every Mon & Fri and then 2.5 mg all other days Provider, Historical  
spironolactone (ALDACTONE) 25 mg tablet Take 25 mg by mouth daily. Provider, Historical  
nitroglycerin (NITROSTAT) 0.4 mg SL tablet 1 Tab by SubLINGual route every five (5) minutes as needed for Chest Pain. 12/18/18   Reuben James MD  
sertraline (ZOLOFT) 50 mg tablet Take 1 Tab by mouth daily. 12/18/18   Reuben James MD  
simvastatin (ZOCOR) 20 mg tablet Take 1 Tab by mouth nightly. 12/12/18   Reuben James MD  
sacubitril-valsartan (ENTRESTO) 24 mg/26 mg tablet Take 1 Tab by mouth two (2) times a day. 11/26/18   Ladarius Pope MD  
fluticasone-vilanterol (BREO ELLIPTA) 269-55 mcg/dose inhaler Take 1 Puff by inhalation daily. 11/1/18   Manuel Callaway NP  
OTHER Trilogy    Provider, Historical  
mometasone-formoterol (DULERA) 200-5 mcg/actuation HFA inhaler 2 puffs bid, rinse mouth after use. Patient taking differently: Take 2 Puffs by inhalation two (2) times daily as needed for Cough. 10/24/18   Manuel Callaway NP  
isosorbide mononitrate ER (IMDUR) 30 mg tablet Take 30 mg by mouth daily. Provider, Historical  
aspirin delayed-release 81 mg tablet Take 81 mg by mouth daily. Provider, Historical  
warfarin (COUMADIN) 2.5 mg tablet Take 2.5 mg by mouth nightly. Needs 5mg on Friday    Provider, Historical  
docusate sodium (COLACE) 50 mg capsule Take 50 mg by mouth daily.     Provider, Historical  
 multivit-minerals/folic acid (ADULT ONE DAILY MULTIVITAMIN PO) Take 1 Tab by mouth daily. Provider, Historical  
ascorbic acid, vitamin C, (VITAMIN C) 500 mg tablet Take 500 mg by mouth daily. Provider, Historical  
ferrous gluconate 324 mg (38 mg iron) tablet TAKE 1 TAB BY MOUTH DAILY (BEFORE BREAKFAST). INDICATIONS: IRON DEFICIENCY ANEMIA Patient taking differently: Take 325 mg by mouth two (2) times daily (with meals). 4/5/18   Katalina Powers MD  
fluticasone (FLONASE) 50 mcg/actuation nasal spray 2 Sprays by Both Nostrils route daily as needed. Patient taking differently: 2 Sprays by Both Nostrils route daily as needed for Allergies. 3/7/18   Katalina Powers MD  
loratadine (CLARITIN) 10 mg tablet TAKE 1 TAB BY MOUTH DAILY. Patient taking differently: TAKE 1 TAB BY MOUTH DAILY PRN 1/2/18   CAROLINA Harrington  
albuterol (PROVENTIL VENTOLIN) 2.5 mg /3 mL (0.083 %) nebulizer solution 3 mL by Nebulization route every four (4) hours as needed for Wheezing. 8/30/17   Malachi Quinn NP  
albuterol (VENTOLIN HFA) 90 mcg/actuation inhaler 2 puffs qid prn Patient taking differently: Take 2 Puffs by inhalation every six (6) hours as needed for Wheezing or Shortness of Breath. 8/30/17   Malachi Quinn NP  
acetaminophen (TYLENOL) 325 mg tablet Take 650 mg by mouth every four (4) hours as needed for Pain. Provider, Historical  
OXYGEN-AIR DELIVERY SYSTEMS 3 L by Nasal route continuous. Provider, Historical  
 
 
Hospital Medications: 
Current Facility-Administered Medications Medication Dose Route Frequency  tuberculin injection 5 Units  5 Units IntraDERMal ONCE  
 albuterol (PROVENTIL VENTOLIN) nebulizer solution 2.5 mg  2.5 mg Nebulization Q4H PRN  
 carvedilol (COREG) tablet 6.25 mg  6.25 mg Oral BID WITH MEALS  isosorbide mononitrate ER (IMDUR) tablet 30 mg  30 mg Oral DAILY  sertraline (ZOLOFT) tablet 50 mg  50 mg Oral DAILY  simvastatin (ZOCOR) tablet 20 mg  20 mg Oral QHS  traZODone (DESYREL) tablet 50 mg  50 mg Oral QHS  vitamin E (AQUA GEMS) capsule 400 Units (Patient Supplied)  400 Units Oral DAILY  sodium chloride (NS) flush 5-40 mL  5-40 mL IntraVENous Q8H  
 sodium chloride (NS) flush 5-40 mL  5-40 mL IntraVENous PRN  
 acetaminophen (TYLENOL) tablet 650 mg  650 mg Oral Q4H PRN  
 HYDROcodone-acetaminophen (NORCO) 5-325 mg per tablet 1 Tab  1 Tab Oral Q4H PRN  
 naloxone (NARCAN) injection 0.4 mg  0.4 mg IntraVENous PRN  
 ondansetron (ZOFRAN) injection 4 mg  4 mg IntraVENous Q4H PRN  
 bisacodyl (DULCOLAX) tablet 10 mg  10 mg Oral DAILY PRN  
 dextrose 5% and 0.9% NaCl infusion  75 mL/hr IntraVENous CONTINUOUS  
 0.9% sodium chloride infusion 250 mL  250 mL IntraVENous PRN  
 budesonide (PULMICORT) 500 mcg/2 ml nebulizer suspension  500 mcg Nebulization BID RT And  
 albuterol (PROVENTIL VENTOLIN) nebulizer solution 2.5 mg  2.5 mg Nebulization Q6HWA RT  
 diphenhydrAMINE (BENADRYL) capsule 25 mg  25 mg Oral Q4H PRN Social History: 
Social History Tobacco Use  Smoking status: Former Smoker Packs/day: 0.25 Years: 42.00 Pack years: 10.50 Types: Cigarettes Start date: 10/1/1956 Last attempt to quit: 2014 Years since quittin.1  Smokeless tobacco: Never Used Substance Use Topics  Alcohol use: No  
  Alcohol/week: 0.0 oz Pt denies any history of drug use, blood transfusions, or tattoos. Family History: 
Family History Problem Relation Age of Onset  Heart Disease Mother CHF  Hypertension Mother  Kidney Disease Mother  Heart Disease Father CHF  Lung Disease Father  Diabetes Father  Cancer Father   
     prostate  Hypertension Father  Heart Attack Father  Heart Disease Maternal Aunt  Diabetes Brother  Coronary Artery Disease Other  Breast Cancer Neg Hx Review of Systems: A detailed 10 system ROS is obtained, with pertinent positives as listed above. All others are negative. Diet:   
 
Objective:  
 
Physical Exam: 
Vitals: 
Visit Vitals /60 (BP 1 Location: Left arm, BP Patient Position: At rest) Pulse 82 Temp 97.8 °F (36.6 °C) Resp 16 Ht 5' 2\" (1.575 m) Wt 92.6 kg (204 lb 3.2 oz) SpO2 98% BMI 37.35 kg/m² Gen:  Pt is alert, cooperative, no acute distress Skin:  Extremities and face reveal no rashes. +ecchymosis HEENT: Sclerae anicteric. Extra-occular muscles are intact. No oral ulcers. No abnormal pigmentation of the lips. The neck is supple. Cardiovascular: Regular rate and rhythm. +click (mechanical valve) Respiratory:  Comfortable breathing with no accessory muscle use. Clear breath sounds anteriorly with no wheezes, rales, or rhonchi. On oxygen via NC 
GI:  Abdomen nondistended, soft, and nontender. Normal active bowel sounds. No enlargement of the liver or spleen. No masses palpable. Rectal:  Deferred Musculoskeletal:  No pitting edema of the lower legs. Neurological:  Gross memory appears intact. Patient is alert and oriented. Psychiatric:  Mood appears appropriate with judgement intact. Lymphatic:  No cervical or supraclavicular adenopathy. Laboratory:   
Recent Labs  
  06/07/19 
0932 06/07/19 
0315 06/06/19 
1909 06/06/19 
1547 WBC  --  7.0  --  7.4 HGB 8.5* 8.7*  --  7.1*  
HCT 27.2* 27.5*  --  22.6* PLT  --  146*  --  166 MCV  --  89.3  --  89.3 NA  --  144  --  139 K  --  5.3* 4.9 7.1*  
CL  --  108*  --  104 CO2  --  29  --  29 BUN  --  112*  --  116* CREA  --  3.35*  --  3.71* CA  --  9.7  --  9.4 GLU  --  124*  --  94  
AP  --   --   --  56 SGOT  --   --   --  39* ALT  --   --   --  19  
TBILI  --   --   --  0.6 ALB  --   --   --  3.5 TP  --   --   --  7.2 PTP  --  32.3*  --  35.3* INR  --  3.2  --  3.6* Assessment:  
 
Principal Problem: 
  Symptomatic anemia (6/6/2019) Active Problems: 
  MDS (myelodysplastic syndrome) ([facility] [address] (12/17/2011) Overview: Procrit started in August, 2011 12/18/11 Procrit weekly and Iron stores. 5-12 12-13-12 good response to 3 weekly procrit did not need it last time 3-7-13 Pt doing well. Just wanted a \"check-up. \" Responding to Procrit  
    every three weeks. 8-29-13 patient has missed some injections on recently restarted  
    hemoglobin only issue , takes oral iron iron stores each time Essential hypertension, benign (1/20/2013) CKD (chronic kidney disease) stage 4, GFR 15-29 ml/min ([facility]) (7/10/2013) COPD (chronic obstructive pulmonary disease) ([facility] [address] (4/2/2014) Overview: HOME OXYGEN 3 LITERS 
 
  HLD (hyperlipidemia) () 
 
  S/P AVR (aortic valve replacement) () Overview: Mechanical/Artific Type 2 diabetes mellitus with nephropathy ([facility] [address] (12/18/2017) Systolic CHF, acute on chronic ([facility] [address] (12/31/2018) Hyperkalemia (6/6/2019) Right shoulder pain (6/6/2019) 68 y.o. female with PMH of CKD stage IV, COPD on trilogy at night and 3L O2 durign day, myelodysplastic syndrome w anemia, htn, hld, and CAD w cardiac arrest 5-2014 w LHC and PCI to LAD, cardiomyopathy s/p mech AVR (on coumadin) w echo  w EF 25% w severe diffuse hypokinesis (worse apical and septal) w mech AVR and mild MR- s/p Biotronik ICD, who presented to the ED with complaints of frequent falls and weakness. She is seen in consultation at the request of Buffy Mai NP for GIB. Patient reports no prior GI evaluation and no significant GI sx. Denies overt bleeding. She  received 1 unit of PRBCs overnight and Hgb this am is 8.7. INR this am is 3.2 (down from 4.3 on 6/3). Additional anemia evaluation reveals:  iron 78, folate 36, B12 963, TIBC 239, saturation 33%, jhon 81. Hematology, Nephrology, Cardiology following.  Anemia is likely multifactorial considering coumadin use, CKD with increased creatinine, and myelodysplastic syndrome. Hemoccult positive stools in presence of INR >4 and in absence of over bleeding is less concerning for GI bleed. Iron studies are not consistent with Iron deficiency. With absence of overt bleeding, would manage conservatively. Plan:  
 
- continue renal evaluation 
- monitor H/H and transfuse prn 
- coumadin held- will need to resume some form of anticoagulation due to mechanical valve. - no plans for EGD or colonoscopy at this time. Patient is seen and examined in collaboration with Dr. Odalis Jung. Assessment and plan as per Dr. Odalis Jung. [facility] and examined. Agree with above. She is somnolent on my evaluation. She has seen multiple physicians today and is tired. Daughter is a Nurse on this floor. Smudge / smear of blood noted with stools. Discussed assessment and plans with patient. Will need Sigmoidoscopy / Colonoscopy after INR decreased - slowly or with reversal agents if she has increased bleeding. Agree with use of Heparin window when INR subtherapeutic for GI workup. The patient has been counseled on the technique, risks (anesthesia risk, gastrointestinal bleeding, perforation and infections), and benefits of Colonoscopy, and agrees to proceed if recommended. Will discuss with family when available. Will have Nursing staff document all stools. Shanice Jung MD

## 2019-06-07 NOTE — PROGRESS NOTES
Pt came to floor on 6/6/19 at 2030 from the ED Orders that were to begin on this floor were started were started at that time. Pt received 1 unit of blood during this shift. Pt daughter concerned about the IV fluids being administered without blood glucose monitoring, MD contacted and orders received. Hourly rounds made and all needs met at this time.

## 2019-06-07 NOTE — CONSULTS
[facility] Consult Note      
consulted GI Physician: Dr. Kim Hurd Referring Provider:  Ro Greene NP Consult Date:  6/7/2019 Admit Date:  6/6/2019 Chief Complaint:  GIB Subjective:  
 
History of Present Illness:  Patient is a 68 y.o. female with PMH of CKD st IV, COPD on trilogy at night and 3L O2 durign day, myelodysplastic syndrome w anemia, htn, hld, and CAD w cardiac arrest 5-2014 w LHC and PCI to LAD, cardiomyopathy s/p mech AVR (on coumadin) w echo  w EF 25% w severe diffuse hypokinesis (worse apical and septal) w mech AVR and mild MR- s/p Biotronik ICD, who presented to the ED with complaints of frequent falls and weakness. She is seen in consultation at the request of Ro Greene NP for GIB. Patient reports no prior GI evaluation. There is no known family hx of Gi malignancy. She has had normal to hard stools without significant constipation and no hx of persistent diarrhea. She denies Hematochezia or melena and denies N/V or symptoms of reflux. Apparently there has been heme-positive stool here. Labs: h/h 7.1/22, MCV 89.3, platelet 597X,  K 7.1, INR 3.6,  creatinine 3.71 and . CXR negative. Xray of shoulder negative. EKG showed SR with PACs and LBBB. She received 1 unit of PRBCs overnight and Hgb this am is 8.7. INR this am is 3.2. Additional anemia evaluation reveals:  iron 78, folate 36, B12 963, TIBC 239, saturation 33%, jhon 81.  
  
 
 
 
PMH: 
Past Medical History:  
Diagnosis Date  Anticoagulated on Coumadin 7/9/2013 S/P AVR  CAD (coronary artery disease) 1/20/2013 5/8/14 PCI LAD with stent placed  Cardiomyopathy ([facility]  CHF (congestive heart failure) ([facility] 2/17/2017  CKD (chronic kidney disease) stage 3, GFR 30-59 ml/min ([facility]) 7/10/2013  COPD (chronic obstructive pulmonary disease) ([facility] 4/2/2014  Diabetes ([facility]  Essential hypertension, benign 1/20/2013  HLD (hyperlipidemia)  ICD (implantable cardioverter-defibrillator) in place 10/2/2014 Biotronik single-chamber ICD implantation 10/20/14  Iron deficiency anemia due to chronic blood loss 2009  MDS (myelodysplastic syndrome) ([facility] 2011 Procrit started in 11 Procrit weekly and Iron stores. 12 good response to 3 weekly procrit did not need it last time  3- Pt doing well. Just wanted a \"check-up. \" Responding to Procrit every three weeks. 13 patient has missed some injections on recently restarted hemoglobin only issue , takes oral iron iron stores each time  REJI (obstructive sleep apnea)-cpap 2014  Osteoarthritis  Osteopenia  Pulmonary hypertension ([facility] 6/15/2016  Quadrantanopia  Skin defect 2018  Visual complaint 2015 PSH: 
Past Surgical History:  
Procedure Laterality Date [facility]    HX AORTIC VALVE REPLACEMENT  ,   
 mechanical valve   HX  SECTION    
 HX CORONARY STENT PLACEMENT  May, 2014 STEMI with Stent placement.  HX ENDOSCOPY  2009 EGD [address] CATHETERIZATION    HX PACEMAKER  10/2/2014 Biotronik ICD  HX TUBAL LIGATION    
 IR BX BONE MARROW DIAGNOSTIC  2011 Allergies: Allergies Allergen Reactions  Sulfa (Sulfonamide Antibiotics) Hives  Aspirin Nausea Only Cannot take uncoated aspirin Home Medications: 
Prior to Admission medications Medication Sig Start Date End Date Taking? Authorizing Provider  
furosemide (LASIX) 40 mg tablet TAKE 1 TABLET BY MOUTH EVERY DAY 19   Felisha Arnett MD  
calcium carbonate (CALCIUM 600 PO) Take 1 Tab by mouth daily. Provider, Historical  
vitamin E (AQUA GEMS) 400 unit capsule Take 400 Units by mouth daily. Provider, Historical  
traZODone (DESYREL) 50 mg tablet TAKE 1/2-1 TABS BY MOUTH NIGHTLY.  3/19/19   Caden Castillo MD  
 carvedilol (COREG) 6.25 mg tablet Take 1 Tab by mouth two (2) times daily (with meals). 1/21/19   Tirso GOULD MD  
traMADol Simeon Men) 50 mg tablet Take 1 Tab by mouth every six (6) hours as needed for Pain. Max Daily Amount: 200 mg. 12/27/18   Francis Kwong MD  
cholecalciferol, vitamin D3, (VITAMIN D3) 2,000 unit tab Take 2,000 Units by mouth daily. Provider, Historical  
warfarin (COUMADIN) 5 mg tablet Take 5 mg by mouth every Monday and Friday. 5 mg every Mon & Fri and then 2.5 mg all other days Provider, Historical  
spironolactone (ALDACTONE) 25 mg tablet Take 25 mg by mouth daily. Provider, Historical  
nitroglycerin (NITROSTAT) 0.4 mg SL tablet 1 Tab by SubLINGual route every five (5) minutes as needed for Chest Pain. 12/18/18   Francis Kwong MD  
sertraline (ZOLOFT) 50 mg tablet Take 1 Tab by mouth daily. 12/18/18   Francis Kwong MD  
simvastatin (ZOCOR) 20 mg tablet Take 1 Tab by mouth nightly. 12/12/18   Francis Kwong MD  
sacubitril-valsartan (ENTRESTO) 24 mg/26 mg tablet Take 1 Tab by mouth two (2) times a day. 11/26/18   Melanie Hermosillo MD  
fluticasone-vilanterol (BREO ELLIPTA) 521-88 mcg/dose inhaler Take 1 Puff by inhalation daily. 11/1/18   Concepción Baumann NP  
OTHER Trilogy    Provider, Historical  
mometasone-formoterol (DULERA) 200-5 mcg/actuation HFA inhaler 2 puffs bid, rinse mouth after use. Patient taking differently: Take 2 Puffs by inhalation two (2) times daily as needed for Cough. 10/24/18   Concepción Baumann NP  
isosorbide mononitrate ER (IMDUR) 30 mg tablet Take 30 mg by mouth daily. Provider, Historical  
aspirin delayed-release 81 mg tablet Take 81 mg by mouth daily. Provider, Historical  
warfarin (COUMADIN) 2.5 mg tablet Take 2.5 mg by mouth nightly. Needs 5mg on Friday    Provider, Historical  
docusate sodium (COLACE) 50 mg capsule Take 50 mg by mouth daily.     Provider, Historical  
 multivit-minerals/folic acid (ADULT ONE DAILY MULTIVITAMIN PO) Take 1 Tab by mouth daily. Provider, Historical  
ascorbic acid, vitamin C, (VITAMIN C) 500 mg tablet Take 500 mg by mouth daily. Provider, Historical  
ferrous gluconate 324 mg (38 mg iron) tablet TAKE 1 TAB BY MOUTH DAILY (BEFORE BREAKFAST). INDICATIONS: IRON DEFICIENCY ANEMIA Patient taking differently: Take 325 mg by mouth two (2) times daily (with meals). 4/5/18   Beatriz Ramirez MD  
fluticasone (FLONASE) 50 mcg/actuation nasal spray 2 Sprays by Both Nostrils route daily as needed. Patient taking differently: 2 Sprays by Both Nostrils route daily as needed for Allergies. 3/7/18   Beatriz Ramirez MD  
loratadine (CLARITIN) 10 mg tablet TAKE 1 TAB BY MOUTH DAILY. Patient taking differently: TAKE 1 TAB BY MOUTH DAILY PRN 1/2/18   CAROLINA Joy  
albuterol (PROVENTIL VENTOLIN) 2.5 mg /3 mL (0.083 %) nebulizer solution 3 mL by Nebulization route every four (4) hours as needed for Wheezing. 8/30/17   Lawyer Valeria NP  
albuterol (VENTOLIN HFA) 90 mcg/actuation inhaler 2 puffs qid prn Patient taking differently: Take 2 Puffs by inhalation every six (6) hours as needed for Wheezing or Shortness of Breath. 8/30/17   Lawyer Valeria NP  
acetaminophen (TYLENOL) 325 mg tablet Take 650 mg by mouth every four (4) hours as needed for Pain. Provider, Historical  
OXYGEN-AIR DELIVERY SYSTEMS 3 L by Nasal route continuous. Provider, Historical  
 
 
Hospital Medications: 
Current Facility-Administered Medications Medication Dose Route Frequency  tuberculin injection 5 Units  5 Units IntraDERMal ONCE  
 albuterol (PROVENTIL VENTOLIN) nebulizer solution 2.5 mg  2.5 mg Nebulization Q4H PRN  
 carvedilol (COREG) tablet 6.25 mg  6.25 mg Oral BID WITH MEALS  isosorbide mononitrate ER (IMDUR) tablet 30 mg  30 mg Oral DAILY  sertraline (ZOLOFT) tablet 50 mg  50 mg Oral DAILY  simvastatin (ZOCOR) tablet 20 mg  20 mg Oral QHS  traZODone (DESYREL) tablet 50 mg  50 mg Oral QHS  vitamin E (AQUA GEMS) capsule 400 Units (Patient Supplied)  400 Units Oral DAILY  sodium chloride (NS) flush 5-40 mL  5-40 mL IntraVENous Q8H  
 sodium chloride (NS) flush 5-40 mL  5-40 mL IntraVENous PRN  
 acetaminophen (TYLENOL) tablet 650 mg  650 mg Oral Q4H PRN  
 HYDROcodone-acetaminophen (NORCO) 5-325 mg per tablet 1 Tab  1 Tab Oral Q4H PRN  
 naloxone (NARCAN) injection 0.4 mg  0.4 mg IntraVENous PRN  
 ondansetron (ZOFRAN) injection 4 mg  4 mg IntraVENous Q4H PRN  
 bisacodyl (DULCOLAX) tablet 10 mg  10 mg Oral DAILY PRN  
 dextrose 5% and 0.9% NaCl infusion  75 mL/hr IntraVENous CONTINUOUS  
 0.9% sodium chloride infusion 250 mL  250 mL IntraVENous PRN  
 budesonide (PULMICORT) 500 mcg/2 ml nebulizer suspension  500 mcg Nebulization BID RT And  
 albuterol (PROVENTIL VENTOLIN) nebulizer solution 2.5 mg  2.5 mg Nebulization Q6HWA RT  
 diphenhydrAMINE (BENADRYL) capsule 25 mg  25 mg Oral Q4H PRN Social History: 
Social History Tobacco Use  Smoking status: Former Smoker Packs/day: 0.25 Years: 42.00 Pack years: 10.50 Types: Cigarettes Start date: 10/1/1956 Last attempt to quit: 2014 Years since quittin.1  Smokeless tobacco: Never Used Substance Use Topics  Alcohol use: No  
  Alcohol/week: 0.0 oz Pt denies any history of drug use, blood transfusions, or tattoos. Family History: 
Family History Problem Relation Age of Onset  Heart Disease Mother CHF  Hypertension Mother  Kidney Disease Mother  Heart Disease Father CHF  Lung Disease Father  Diabetes Father  Cancer Father   
     prostate  Hypertension Father  Heart Attack Father  Heart Disease Maternal Aunt  Diabetes Brother  Coronary Artery Disease Other  Breast Cancer Neg Hx Review of Systems: A detailed 10 system ROS is obtained, with pertinent positives as listed above. All others are negative. Diet:   
 
Objective:  
 
Physical Exam: 
Vitals: 
Visit Vitals /60 (BP 1 Location: Left arm, BP Patient Position: At rest) Pulse 82 Temp 97.8 °F (36.6 °C) Resp 16 Ht 5' 2\" (1.575 m) Wt 92.6 kg (204 lb 3.2 oz) SpO2 98% BMI 37.35 kg/m² Gen:  Pt is alert, cooperative, no acute distress Skin:  Extremities and face reveal no rashes. +ecchymosis HEENT: Sclerae anicteric. Extra-occular muscles are intact. No oral ulcers. No abnormal pigmentation of the lips. The neck is supple. Cardiovascular: Regular rate and rhythm. +click (mechanical valve) Respiratory:  Comfortable breathing with no accessory muscle use. Clear breath sounds anteriorly with no wheezes, rales, or rhonchi. On oxygen via NC 
GI:  Abdomen nondistended, soft, and nontender. Normal active bowel sounds. No enlargement of the liver or spleen. No masses palpable. Rectal:  Deferred Musculoskeletal:  No pitting edema of the lower legs. Neurological:  Gross memory appears intact. Patient is alert and oriented. Psychiatric:  Mood appears appropriate with judgement intact. Lymphatic:  No cervical or supraclavicular adenopathy. Laboratory:   
Recent Labs  
  06/07/19 
0932 06/07/19 
0315 06/06/19 
1909 06/06/19 
1547 WBC  --  7.0  --  7.4 HGB 8.5* 8.7*  --  7.1*  
HCT 27.2* 27.5*  --  22.6* PLT  --  146*  --  166 MCV  --  89.3  --  89.3 NA  --  144  --  139 K  --  5.3* 4.9 7.1*  
CL  --  108*  --  104 CO2  --  29  --  29 BUN  --  112*  --  116* CREA  --  3.35*  --  3.71* CA  --  9.7  --  9.4 GLU  --  124*  --  94  
AP  --   --   --  56 SGOT  --   --   --  39* ALT  --   --   --  19  
TBILI  --   --   --  0.6 ALB  --   --   --  3.5 TP  --   --   --  7.2 PTP  --  32.3*  --  35.3* INR  --  3.2  --  3.6* Assessment:  
 
Principal Problem: 
  Symptomatic anemia (6/6/2019) Active Problems: 
  MDS (myelodysplastic syndrome) ([facility] (12/17/2011) Overview: Procrit started in August, 2011 12/18/11 Procrit weekly and Iron stores. 5-12 12-13-12 good response to 3 weekly procrit did not need it last time 3-7-13 Pt doing well. Just wanted a \"check-up. \" Responding to Procrit  
    every three weeks. 8-29-13 patient has missed some injections on recently restarted  
    hemoglobin only issue , takes oral iron iron stores each time Essential hypertension, benign (1/20/2013) CKD (chronic kidney disease) stage 4, GFR 15-29 ml/min ([facility]) (7/10/2013) COPD (chronic obstructive pulmonary disease) ([facility] [facility] (4/2/2014) Overview: HOME OXYGEN 3 LITERS 
 
  HLD (hyperlipidemia) () 
 
  S/P AVR (aortic valve replacement) () Overview: Mechanical/Artific Type 2 diabetes mellitus with nephropathy ([facility] [address] [facility] (12/18/2017) Systolic CHF, acute on chronic ([facility] [facility] (12/31/2018) Hyperkalemia (6/6/2019) Right shoulder pain (6/6/2019) 68 y.o. female with PMH of CKD stage IV, COPD on trilogy at night and 3L O2 durign day, myelodysplastic syndrome w anemia, htn, hld, and CAD w cardiac arrest 5-2014 w LHC and PCI to LAD, cardiomyopathy s/p mech AVR (on coumadin) w echo  w EF 25% w severe diffuse hypokinesis (worse apical and septal) w mech AVR and mild MR- s/p Biotronik ICD, who presented to the ED with complaints of frequent falls and weakness. She is seen in consultation at the request of Sandhya Ralph NP for GIB. Patient reports no prior GI evaluation and no significant GI sx. Denies overt bleeding. She  received 1 unit of PRBCs overnight and Hgb this am is 8.7. INR this am is 3.2 (down from 4.3 on 6/3). Additional anemia evaluation reveals:  iron 78, folate 36, B12 963, TIBC 239, saturation 33%, jhon 81. Hematology, Nephrology, Cardiology following.  Anemia is likely multifactorial considering coumadin use, CKD with increased creatinine, and myelodysplastic syndrome. Hemoccult positive stools in presence of INR >4 and in absence of over bleeding is less concerning for GI bleed. Iron studies are not consistent with Iron deficiency. With absence of overt bleeding, would manage conservatively. Plan:  
 
- continue renal evaluation 
- monitor H/H and transfuse prn 
- coumadin held- will need to resume some form of anticoagulation due to mechanical valve. - no plans for EGD or colonoscopy at this time. Patient is seen and examined in collaboration with Dr. Jimbo Davies. Assessment and plan as per Dr. Jimbo Davies. [facility] [place] [place] Patient seen and examined. Agree with above. She is somnolent on my evaluation. She has seen multiple physicians today and is tired. Daughter is a Nurse on this floor. Smudge / smear of blood noted with stools. Discussed assessment and plans with patient. Will need Sigmoidoscopy / Colonoscopy after INR decreased - slowly or with reversal agents if she has increased bleeding. Agree with use of Heparin window when INR subtherapeutic for GI workup. The patient has been counseled on the technique, risks (anesthesia risk, gastrointestinal bleeding, perforation and infections), and benefits of Colonoscopy, and agrees to proceed if recommended. Will discuss with family when available. Will have Nursing staff document all stools. Shanice Davies MD

## 2019-06-07 NOTE — PROGRESS NOTES
Interdisciplinary Rounds completed 06/07/19. Nursing, Case Management, Physician and PT present. Plan of care reviewed and updated.

## 2019-06-07 NOTE — ED NOTES
TRANSFER - OUT REPORT: 
 
Verbal report given to Bennie Love RN(name) on [employer]  being transferred to [facility](unit) for routine progression of care Report consisted of patients Situation, Background, Assessment and  
Recommendations(SBAR). Information from the following report(s) SBAR and ED Summary was reviewed with the receiving nurse. Lines:  
Peripheral IV 06/06/19 Left Arm (Active) Site Assessment Clean, dry, & intact 6/6/2019  3:46 PM  
Phlebitis Assessment 0 6/6/2019  3:46 PM  
Infiltration Assessment 0 6/6/2019  3:46 PM  
Dressing Status Clean, dry, & intact 6/6/2019  3:46 PM  
   
Peripheral IV 06/06/19 Right External jugular (Active) Site Assessment Clean, dry, & intact 6/6/2019  5:28 PM  
Phlebitis Assessment 0 6/6/2019  5:28 PM  
Infiltration Assessment 0 6/6/2019  5:28 PM  
Dressing Status Clean, dry, & intact 6/6/2019  5:28 PM  
  
 
Opportunity for questions and clarification was provided. Patient transported with: 
 [email or web address]

## 2019-06-07 NOTE — PROGRESS NOTES
Hourly rounds completed. Patient alert and oriented. Eating well from meal trays with assistance. No needs at this time. Will continue to monitor and report to oncoming RN.

## 2019-06-07 NOTE — PROGRESS NOTES
06/06/19 2049 Dual Skin Pressure Injury Assessment Dual Skin Pressure Injury Assessment WDL Second Care Provider (Based on [address] [place] [address]) [address] Skin Integumentary Skin Integumentary (WDL) X Skin Color Appropriate for ethnicity; Ecchymosis (comment) (bruising on R abd, R side, L hip) Skin Condition/Temp Dry; Warm  
Skin Integrity Abrasion 
(Abrasions bilateral shins ) Turgor Non-tenting Hair Growth Sparce Varicosities Absent

## 2019-06-07 NOTE — CONSULTS
[facility] Cardiology Consult Date of  Admission: 6/6/2019  2:50 PM  
 
Primary Care Physician: Dr Shea Jones Primary Cardiologist: Dr Sarah James Referring Physician: Dr Teodoro Fox Consulting Physician: Dr Sarah James 
  
CC/Reason for consult: mechanical valve, heart failure, GI bleed Creighton Schirmer is a 68 y.o. female with PMH of CKD st IV, COPD on trilogy at night and 3L O2 durign day, myelodysplastic syndrome w anemia, htn, hld, and CAD w cardiac arrest 5-2014 w LHC and PCI to LAD, cardiomyopathy [facility] (on [facility] w echo  w EF 25% w severe diffuse hypokinesis (worse apical and septal) w mech AVR and mild MR- s/p Biotronik ICD, who presented to the ED with complaints of frequent falls and weakness. She denied any bloody stool. Labs showed h/h 7.1/22, K 7.1, INR 3.6,  creatinine 3.71 and . Hemoccult +. CXR negative. Xray of shoulder negative. EKG showed SR with PACs and LBBB. Patient was admitted for treatment. GI, hemeonc, and nephrology consulted. Patient received 1 unit of PRBCs overnight and Hgb this am is 8.7. INR this am is 3.2. On exam patient is awake and alert. No complaints of chest pain, shortness of breath, or syncope. Patient Active Problem List  
Diagnosis Code  Iron deficiency anemia due to chronic blood loss D50.0  MDS (myelodysplastic syndrome) ([facility]) D46.9  CAD (coronary artery disease) I25.10  Chronic combined systolic and diastolic heart failure ([facility]) I50.42  
 Essential hypertension, benign I10  
 Anticoagulated on Coumadin Z79.01  
 CKD (chronic kidney disease) stage 4, GFR 15-29 ml/min ([facility]) N18.4  COPD (chronic obstructive pulmonary disease) ([facility]) J44.9  REJI (obstructive sleep apnea)-cpap G47.33  
 ICD (implantable cardioverter-defibrillator) in place Z95.810  
 Osteopenia M85.80  
 HLD (hyperlipidemia) E78.5  Osteoarthritis M19.90  
 S/P AVR (aortic valve replacement) Z95.2  Cardiomyopathy ([address] I42.9  Type 2 diabetes mellitus with nephropathy ([facility]) E11.21  
 Closed nondisplaced fracture of shaft of fifth metacarpal bone of left hand S62.357A  Physical debility R53.81  
 Encounter for immunization Z23  Anemia D64.9  Chronic respiratory failure with hypoxia ([facility]) J96.11  
 Debility R53.81  
 Acute exacerbation of CHF (congestive heart failure) ([facility]) I50.9  Systolic CHF, acute on chronic ([facility]) I50.23  
 Acute on chronic combined systolic and diastolic CHF (congestive heart failure) ([facility]) I50.43  
 Skin defect L98.9  Severe obesity ([facility]) E66.01  
 Excessive granulation tissue L92.9  Symptomatic anemia D64.9  Hyperkalemia E87.5  Right shoulder pain M25.511 Past Medical History:  
Diagnosis Date  Anticoagulated on Coumadin 7/9/2013 S/P AVR  CAD (coronary artery disease) 1/20/2013 5/8/14 PCI LAD with stent placed  Cardiomyopathy ([address]  CHF (congestive heart failure) ([address] 2/17/2017  CKD (chronic kidney disease) stage 3, GFR 30-59 ml/min ([facility]) 7/10/2013  COPD (chronic obstructive pulmonary disease) ([address] 4/2/2014  Diabetes ([address]  Essential hypertension, benign 1/20/2013  HLD (hyperlipidemia)  ICD (implantable cardioverter-defibrillator) in place 10/2/2014 Biotronik single-chamber ICD implantation 10/20/14  Iron deficiency anemia due to chronic blood loss 7/29/2009  MDS (myelodysplastic syndrome) ([address] 12/17/2011 Procrit started in August, 2011 12/18/11 Procrit weekly and Iron stores. 5-12 12-13-12 good response to 3 weekly procrit did not need it last time  3-7-13 Pt doing well. Just wanted a \"check-up. \" Responding to Procrit every three weeks. 8-29-13 patient has missed some injections on recently restarted hemoglobin only issue , takes oral iron iron stores each time  REJI (obstructive sleep apnea)-cpap 4/2/2014  Osteoarthritis  Osteopenia  Pulmonary hypertension ([address] 6/15/2016  Quadrantanopia  Skin defect 2018  Visual complaint 2015 Past Surgical History:  
Procedure Laterality Date [address]    HX AORTIC VALVE REPLACEMENT  ,   
 mechanical valve   HX  SECTION    
 HX CORONARY STENT PLACEMENT  May, 2014 STEMI with Stent placement.  HX ENDOSCOPY  2009 EGD [address] CATHETERIZATION    HX PACEMAKER  10/2/2014 Biotronik ICD  HX TUBAL LIGATION    
 IR BX BONE MARROW DIAGNOSTIC  2011 Allergies Allergen Reactions  Sulfa (Sulfonamide Antibiotics) Hives  Aspirin Nausea Only Cannot take uncoated aspirin Family History Problem Relation Age of Onset  Heart Disease Mother CHF  Hypertension Mother  Kidney Disease Mother  Heart Disease Father CHF  Lung Disease Father  Diabetes Father  Cancer Father   
     prostate  Hypertension Father  Heart Attack Father  Heart Disease Maternal Aunt  Diabetes Brother  Coronary Artery Disease Other  Breast Cancer Neg Hx Current Facility-Administered Medications Medication Dose Route Frequency  albuterol (PROVENTIL VENTOLIN) nebulizer solution 2.5 mg  2.5 mg Nebulization Q4H PRN  
 carvedilol (COREG) tablet 6.25 mg  6.25 mg Oral BID WITH MEALS  isosorbide mononitrate ER (IMDUR) tablet 30 mg  30 mg Oral DAILY  sertraline (ZOLOFT) tablet 50 mg  50 mg Oral DAILY  simvastatin (ZOCOR) tablet 20 mg  20 mg Oral QHS  traZODone (DESYREL) tablet 50 mg  50 mg Oral QHS  vitamin E (AQUA GEMS) capsule 400 Units (Patient Supplied)  400 Units Oral DAILY  sodium chloride (NS) flush 5-40 mL  5-40 mL IntraVENous Q8H  
 sodium chloride (NS) flush 5-40 mL  5-40 mL IntraVENous PRN  
 acetaminophen (TYLENOL) tablet 650 mg  650 mg Oral Q4H PRN  
 HYDROcodone-acetaminophen (NORCO) 5-325 mg per tablet 1 Tab  1 Tab Oral Q4H PRN  
 naloxone (NARCAN) injection 0.4 mg  0.4 mg IntraVENous PRN  
  ondansetron (ZOFRAN) injection 4 mg  4 mg IntraVENous Q4H PRN  
 bisacodyl (DULCOLAX) tablet 10 mg  10 mg Oral DAILY PRN  
 dextrose 5% and 0.9% NaCl infusion  75 mL/hr IntraVENous CONTINUOUS  
 0.9% sodium chloride infusion 250 mL  250 mL IntraVENous PRN  
 budesonide (PULMICORT) 500 mcg/2 ml nebulizer suspension  500 mcg Nebulization BID RT And  
 albuterol (PROVENTIL VENTOLIN) nebulizer solution 2.5 mg  2.5 mg Nebulization Q6HWA RT  
 diphenhydrAMINE (BENADRYL) capsule 25 mg  25 mg Oral Q4H PRN Review of Systems Constitutional: Negative. HENT: Negative. Eyes: Negative. Respiratory: Negative. Cardiovascular: Negative. Gastrointestinal: Negative. Genitourinary: Negative. Musculoskeletal: Positive for falls. Skin: Negative. Neurological: Positive for weakness. Endo/Heme/Allergies: Negative. Psychiatric/Behavioral: Negative. Physical Exam 
Vitals:  
 06/06/19 2345 06/07/19 0045 06/07/19 0145 06/07/19 2764 BP: 133/73 113/64 118/60 Pulse: 84 91 82 Resp: 14 16 16 Temp: 98 °F (36.7 °C) 98.3 °F (36.8 °C) 97.8 °F (36.6 °C) SpO2: 95% 100% 99% 98% Weight:      
Height:      
 
 
Physical Exam: 
Physical Exam  
Constitutional: She is oriented to person, place, and time. She has a sickly appearance. HENT:  
Head: Normocephalic. Eyes: Pupils are equal, round, and reactive to light. Neck: Normal range of motion. Cardiovascular: Normal rate and regular rhythm. AVR-- crisp clicks Pulmonary/Chest: Effort normal and breath sounds normal.  
Abdominal: Soft. Bowel sounds are normal.  
Musculoskeletal: Normal range of motion. Neurological: She is alert and oriented to person, place, and time. Skin: Skin is warm and dry. Bruising and ecchymosis noted. Psychiatric: Mood, memory, affect and judgment normal.  
 
 
Cardiographics Telemetry: normal sinus rhythm ECG: normal sinus rhythm, LBBB, PAC's noted Labs:  
Recent Labs  
  06/07/19 2293 06/06/19 
1909 06/06/19 
1547   --  139  
K 5.3* 4.9 7.1*  
*  --  116* CREA 3.35*  --  3.71* *  --  94 WBC 7.0  --  7.4 HGB 8.7*  --  7.1*  
HCT 27.5*  --  22.6*  
*  --  166 INR 3.2  --  3.6* Assessment/Plan: 
 
 Assessment:  
  
Principal Problem: 
  Symptomatic anemia-- improve with unit of PRBCs. GI consulted. Active Problems: 
  MDS (myelodysplastic syndrome) -- heme/onc consulted Essential hypertension, benign -- stable CKD (chronic kidney disease) stage 4, GFR 15-29 ml/min-- receiving gentle hydration. Nephrology consulted. COPD (chronic obstructive pulmonary disease) Overview: HOME OXYGEN 3 LITERS 
 
  HLD (hyperlipidemia) -- on statin S/P AVR (aortic valve replacement, mechanical)-- on coumadin at home (INR 3.2). As INR comes down, will need heparin bridge once safe from GI standpoint and H&H stable. Type 2 diabetes mellitus with nephropathy -- per primary Systolic CHF, chronic -- gentle hydration, monitor volume status closely. Continue BB, no ACE/ARB. Hyperkalemia-- nephrology consulted Right shoulder pain-- pain meds as needed Thank you very much for this referral. We appreciate the opportunity to participate in this patient's care. We will follow along with above stated plan. Augustin Wiley NP Consulting MD: Leobardo Lilly

## 2019-06-07 NOTE — PROGRESS NOTES
Chart reviewed. Awaiting pt/ot eval to determine discharge needs. Care Management Interventions PCP Verified by CM: Yes Mode of Transport at Discharge: Other (see comment) Transition of Care Consult (CM Consult): Discharge Planning Discharge Durable Medical Equipment: No 
Physical Therapy Consult: Yes Occupational Therapy Consult: Yes Current Support Network: Own Home Confirm Follow Up Transport: Family Plan discussed with Pt/Family/Caregiver: Yes Freedom of Choice Offered: Yes Discharge Location Discharge Placement: Unable to determine at this time

## 2019-06-07 NOTE — PROGRESS NOTES
TRANSFER - IN REPORT: 
 
Verbal report received from Aba(name) on [facility]  being received from ED(unit) for routine progression of care Report consisted of patients Situation, Background, Assessment and  
Recommendations(SBAR). Information from the following report(s) SBAR, ED Summary and Recent Results was reviewed with the receiving nurse. Opportunity for questions and clarification was provided. Assessment completed upon patients arrival to unit and care assumed.

## 2019-06-07 NOTE — PROGRESS NOTES
Hospitalist Progress Note 2019 Admit Date: 2019  2:50 PM  
NAME: Raji Billingsley :  1950 MRN:  [id number] Attending: Migue Newton DO 
PCP:  Adriana Luna MD 
 
SUBJECTIVE:  
69 yo female with PMH significant for myelodisplastic syndrome, DM2, CKD stage 4, HTN, sHF, HLD, COPD, AVR on chronic coumadin. Pt presented to the ED via EMS with report of fatigue. Pt also states that she has missed her recent iron injections. Pt initially denied any falls but did recall a fall which injured her right arm/shoulder a few days prior. On admit it was found that pt hgb 7.1, K+ also 7.1, Cr 3.71, . Stool was FOB positive. CXR negative, EKG LBBB, axis deviation to the left. UA unremarkable. Pt admitted to hospitalist for symptomatic anemia, Ac on CKD, hyperkalemia and ? GIB 
 
 
 - pt more alert today. Daughters at bedside. Denies pain and says she feels less tired. Daughter (who is an RN) reports a smudge of dark stool mixed with BRB on tissue paper as she was cleaning her mother up. Review of Systems negative with exception of pertinent positives noted above PHYSICAL EXAM  
 
Visit Vitals /52 Pulse 78 Temp 98.1 °F (36.7 °C) Resp 18 Ht 5' 2\" (1.575 m) Wt 92.6 kg (204 lb 3.2 oz) SpO2 97% BMI 37.35 kg/m² Temp (24hrs), Av °F (36.7 °C), Min:97.8 °F (36.6 °C), Max:98.3 °F (36.8 °C) Oxygen Therapy O2 Sat (%): 97 % (19) Pulse via Oximetry: 85 beats per minute (19) O2 Device: Nasal cannula (19) O2 Flow Rate (L/min): 3 l/min (19) Intake/Output Summary (Last 24 hours) at 2019 1846 Last data filed at 2019 1300 Gross per 24 hour Intake  Output 200 ml Net -200 ml General: No acute distress  obese Lungs:  Diminished w/o wheezing or rhonchi Heart:  Regular rate and rhythm,systolic  click present, Abdomen: Soft, Non distended, Non tender, Positive bowel sounds Extremities: No cyanosis, clubbing or edema Neurologic:  No focal deficits ASSESSMENT Active Hospital Problems Diagnosis Date Noted  Symptomatic anemia 06/06/2019  Hyperkalemia 06/06/2019  Right shoulder pain 06/06/2019  Systolic CHF, acute on chronic ([facility] 12/31/2018  Type 2 diabetes mellitus with nephropathy ([facility] 12/18/2017  S/P AVR (aortic valve replacement) Mechanical/Artific  HLD (hyperlipidemia)  COPD (chronic obstructive pulmonary disease) ([facility] 04/02/2014 HOME OXYGEN 3 LITERS 
  
 CKD (chronic kidney disease) stage 4, GFR 15-29 ml/min ([facility]) 07/10/2013  Essential hypertension, benign 01/20/2013  MDS (myelodysplastic syndrome) ([facility] 12/17/2011 Procrit started in August, 2011 12/18/11 Procrit weekly and Iron stores. 5-12 12-13-12 good response to 3 weekly procrit did not need it last time 3-7-13 Pt doing well. Just wanted a \"check-up. \" Responding to Procrit every three weeks. 8-29-13 patient has missed some injections on recently restarted hemoglobin only issue , takes oral iron iron stores each time A/p - Symptomatic anemia - likely multifactorial from hx of MDS and now GIB. Heme/ GI following. 1 unit prbc 6/6 for hgb 7.1. H/h stable today at 8.5.  
 
- GI bleed - occ pos in ER. Report of red blood on tissue. GI following. Add IV protonix. Continue serial h/h. Plan for egd/colo per GI once INR trended down with hep bridge. - MDS - heme/onc has seen. Ordered procrit and iron labs 
 
- mechanical AVR - needs hep gtt as window once INR <2.5. Repeat inr in AM 
 
- acute on CKD/ hyperkalemia - stage 4. Nephrology following. Renal US wnl. SLOW ivf. Repeat in Am. Holding lasix and entresto. - systolic CHF - as above. Appears euvolemic. Monitor closely as slow IVF running for FELIZ. - right shoulder pain - s/p fall. xrays neg for fx.  
 
- COPD - stable, supplemental ox prn. Cont home nebs DVT Prophylaxis: SCD's, will need hep gtt once INR <2.5. Signed By: Renetta Ramirez,    
 June 7, 2019

## 2019-06-08 NOTE — PROGRESS NOTES
[facility] CARDIOLOGY PROGRESS NOTE 
      
 
6/8/2019 12:31 PM 
 
Admit Date: 6/6/2019 Subjective:  
 
Patient has no complaints Somnolent this morning Review of Systems Cardiovascular: Negative for chest pain. Objective:  
  
Vitals:  
 06/08/19 0422 06/08/19 0804 06/08/19 9218 06/08/19 1131 BP: 127/70 114/72  119/77 Pulse: 82 81  81 Resp: 18 19 18 Temp: 97.5 °F (36.4 °C) 98.1 °F (36.7 °C)  98.3 °F (36.8 °C) SpO2: 92% 100% 99% 98% Weight: 95.7 kg (210 lb 14.4 oz) Height:      
 
 
 
Physical Exam  
Constitutional: She appears lethargic. HENT:  
Head: Normocephalic and atraumatic. Eyes: Pupils are equal, round, and reactive to light. Conjunctivae are normal.  
Neck: Normal range of motion. No JVD present. Cardiovascular: Normal rate. Murmur heard. Pulmonary/Chest: Effort normal.  
Abdominal: She exhibits no distension. Neurological: She appears lethargic. Skin: Skin is warm. She is not diaphoretic. Psychiatric: Her affect is blunt. Data Review:  
Recent Labs  
  06/08/19 
1055 06/08/19 
0515 06/07/19 
2112  06/07/19 
0315 NA  --  146*  --   --  144 K  --  5.4*  --   --  5.3*  
BUN  --  89*  --   --  112* CREA  --  2.67*  --   --  3.35* GLU  --  90  --   --  124* WBC  --  5.6  --   --  7.0 HGB  --  7.9* 7.8*   < > 8.7* HCT  --  25.6* 25.2*   < > 27.5* PLT  --  125*  --   --  146* INR 2.2  --   --   --  3.2  
 < > = values in this interval not displayed. Intake/Output Summary (Last 24 hours) at 6/8/2019 1231 Last data filed at 6/8/2019 5346 Gross per 24 hour Intake 1312 ml Output 1500 ml Net -188 ml Current Facility-Administered Medications Medication Dose Route Frequency  0.45% sodium chloride infusion  100 mL/hr IntraVENous CONTINUOUS  
 heparin 25,000 units in dextrose 500 mL infusion  12-25 Units/kg/hr IntraVENous TITRATE  tuberculin injection 5 Units  5 Units IntraDERMal ONCE  
  pantoprazole (PROTONIX) 40 mg in sodium chloride 0.9% 10 mL injection  40 mg IntraVENous Q12H  
 albuterol (PROVENTIL VENTOLIN) nebulizer solution 2.5 mg  2.5 mg Nebulization Q4H PRN  
 carvedilol (COREG) tablet 6.25 mg  6.25 mg Oral BID WITH MEALS  isosorbide mononitrate ER (IMDUR) tablet 30 mg  30 mg Oral DAILY  sertraline (ZOLOFT) tablet 50 mg  50 mg Oral DAILY  simvastatin (ZOCOR) tablet 20 mg  20 mg Oral QHS  traZODone (DESYREL) tablet 50 mg  50 mg Oral QHS  vitamin E (AQUA GEMS) capsule 400 Units (Patient Supplied)  400 Units Oral DAILY  sodium chloride (NS) flush 5-40 mL  5-40 mL IntraVENous Q8H  
 sodium chloride (NS) flush 5-40 mL  5-40 mL IntraVENous PRN  
 acetaminophen (TYLENOL) tablet 650 mg  650 mg Oral Q4H PRN  
 HYDROcodone-acetaminophen (NORCO) 5-325 mg per tablet 1 Tab  1 Tab Oral Q4H PRN  
 naloxone (NARCAN) injection 0.4 mg  0.4 mg IntraVENous PRN  
 ondansetron (ZOFRAN) injection 4 mg  4 mg IntraVENous Q4H PRN  
 bisacodyl (DULCOLAX) tablet 10 mg  10 mg Oral DAILY PRN  
 0.9% sodium chloride infusion 250 mL  250 mL IntraVENous PRN  
 budesonide (PULMICORT) 500 mcg/2 ml nebulizer suspension  500 mcg Nebulization BID RT And  
 albuterol (PROVENTIL VENTOLIN) nebulizer solution 2.5 mg  2.5 mg Nebulization Q6HWA RT  
 diphenhydrAMINE (BENADRYL) capsule 25 mg  25 mg Oral Q4H PRN Assessment/Plan:  
 
History of chronic kidney disease stage IV COPD and myelodysplastic syndrome with anemia PCI to LAD 2014 status post mechanical aortic valve replacement on chronic warfarin therapy with severe left ventricular systolic dysfunction with EF 25%. Status post Biotronik ICD. Admitted for severe weakness and anemia 1. Symptomatic anemia most likely multifactorial oncology and dressing. 2. Hypertension controlled 3. Cardiomyopathy on carvedilol afterload reducing agents isosorbide and hydralazine. Not a candidate for ace or arb  therapy due to kidney disease. 4. Mechanical valve in aortic position initiated on heparin drip see guiidlines listed below Continuation of VKA anticoagulation with a therapeutic INR is recommended in patients with mechanical heart valves 
undergoing minor procedures (such as dental extractions or cataract removal) where bleeding is easily controlled. Temporary interruption of VKA anticoagulation, without bridging agents while the INR is subtherapeutic, is recommended in patients with a bileaflet mechanical AVR and no other risk factors for thrombosis who are undergoing invasive or surgical procedures. Bridging therapy with either intravenous unfractionated heparin or low-molecular-weight heparin has evolved empirically to reduce thromboembolic 
events during temporary interruption of oral anticoagulation in higher-risk patients, such as those with a mechanical MVR or AVR and additional risk 
factors for thromboembolism (eg, AF, previous thromboembolism, hypercoagulable condition, older-generation mechanical valves [ball-cage or tilting 
disc], LV systolic dysfunction, or >1 mechanical valve). When interruption of oral VKA therapy is deemed necessary, the agent is usually stopped 3 to 4 days before the procedure (so the INR falls to 
<1.5 for major surgical procedures) and is restarted postoperatively as soon as bleeding risk allows, typically 12 to 24 hours after surgery. Bridging 
anticoagulation with intravenous unfractionated heparin or subcutaneous low-molecular-weight heparin is started when the INR falls below the 
therapeutic threshold (ie, 2.0 or 2.5, depending on the clinical context), usually 36 to 48 hours before surgery, and is stopped 4 to 6 hours (for 
intravenous unfractionated heparin) or 12 hours (for subcutaneous low-molecular-weight heparin) before the procedure. There are no randomized comparative-effectiveness trials evaluating a strategy of bridging versus no bridging in adequate numbers of patients with prosthetic heart valves needing temporary interruption of oral anticoagulant therapy, although such studies are ongoing. The evidence used to 
support bridging therapy derives from cohort studies with poor or no comparator groups. 2  In patient groups other than those with mechanical 
heart valves, increasing concerns have surfaced that bridging therapy exposes patients to higher bleeding risks without reducing the risk of 
thromboembolism. 199 Accordingly, decisions about bridging should be individualized and should account for the trade-offs between thrombosis and 
bleeding. 2017 AHA/ACC Focused Update of the 
2014 AHA/ACC Guideline for the Management 
of Patients With Valvular Heart Disease Saeed Saunders MD 
6/8/2019 12:31 PM

## 2019-06-08 NOTE — PROGRESS NOTES
Problem: Falls - Risk of 
Goal: *Absence of Falls Description Document Lyssa Muro Fall Risk and appropriate interventions in the flowsheet. Outcome: Progressing Towards Goal 
  
Problem: Breathing Pattern - Ineffective Goal: *Absence of hypoxia Outcome: Progressing Towards Goal 
Goal: *Use of effective breathing techniques Outcome: Progressing Towards Goal 
Goal: *PALLIATIVE CARE:  Alleviation of Dyspnea Outcome: Progressing Towards Goal

## 2019-06-08 NOTE — PROGRESS NOTES
Problem: Self Care Deficits Care Plan (Adult) Goal: *Acute Goals and Plan of Care (Insert Text) Description 1. Patient will complete upper body bathing and dressing with minimal assistance and adaptive equipment as needed. 2. Patient will complete toileting with minimal assistance. 3. Patient will tolerate 25 minutes of OT treatment with 2-3 rest breaks to increase activity tolerance for ADLs. 4. Patient will complete functional transfers with supervision and adaptive equipment as needed. 5. Patient will complete functional mobility for household distances with supervision and good safety awareness. Timeframe: 7 visits Outcome: Progressing Towards Goal 
  
OCCUPATIONAL THERAPY: Initial Assessment, Daily Note and PM 6/8/2019 INPATIENT: OT Visit Days: 1 [place] / [place] / Product Type: Medicare /  
  
NAME/AGE/GENDER: Matt Jurado is a 68 y.o. female PRIMARY DIAGNOSIS:  Symptomatic anemia [D64.9] Symptomatic anemia Symptomatic anemia ICD-10: Treatment Diagnosis:  
 Pain in Right Shoulder (M25.511) Stiffness of Right Shoulder, Not elsewhere classified (M25.611) Generalized Muscle Weakness (M62.81) Other lack of cordination (R27.8) Repeated Falls (R29.6) Dizziness and Giddiness (R42) Precautions/Allergies: 
   Ferrlecit [sodium ferric gluconat-sucrose]; Sulfa (sulfonamide antibiotics); and Aspirin ASSESSMENT:  
 
Ms. Diaz presents to the hospital with systematic anemia. Pt was supine in bed sleeping upon arrival. Pt's food tray is by her side and untouched. Pt initially difficult to arouse this afternoon but pt was agreeable to mobilizing to sit edge of bed. Pt became more alert and conversant in sitting. She is quite a pleasant lady. Pt reports she lives alone but has a caregiver that comes in and helps with household chores and some with ADLs. Pt has meals delivered to her home.  Pt has a rollator but states she doesn't use it. Pt's biggest limiting factor are the steps that come out of her home (2-3 steps) that pt reports she has repeatedly fallen down. Pt required minimal to moderate assistance to transfer out of the bed due to pain in her R shoulder. Pt reports it was limited prior to her fall but became worse after the fall. Pt unable to lift the R UE or flex at the elbow. Pt reports significant pain with palpation along the medial shoulder. Pt reports she has been feeding herself L handed but is typically R hand dominant. Pt stood at the edge of the bed with minimal assistance and took steps around in the room with CGA. Pt returned to supine in the bed at end of the session. Pt's most limiting factors include poor use of her R dominant UE due to shoulder pain and hx of frequent falls coming down the stairs at her home. Pt will benefit from OT services to address stated goals and plan of care. This section established at most recent assessment PROBLEM LIST (Impairments causing functional limitations): 
Decreased Strength Decreased ADL/Functional Activities Decreased Transfer Abilities Decreased Ambulation Ability/Technique Decreased Balance Increased Pain Decreased Activity Tolerance Increased Fatigue Decreased Flexibility/Joint Mobility Decreased [place] with Home Exercise Program 
 INTERVENTIONS PLANNED: (Benefits and precautions of occupational therapy have been discussed with the patient.) Activities of daily living training Adaptive equipment training Balance training Clothing management Donning&doffing training Neuromuscular re-eduation Therapeutic activity Therapeutic exercise TREATMENT PLAN: Frequency/Duration: Follow patient 3 times per week to address above goals. Rehabilitation Potential For Stated Goals: Good REHAB RECOMMENDATIONS (at time of discharge pending progress):   
Placement:  
It is my opinion, based on this patient's performance to date, that Ms. Omi Quintero may benefit from intensive therapy at a [address] [place] [address] after discharge due to the functional deficits listed above that are likely to improve with skilled rehabilitation and concerns that he/she may be unsafe to be unsupervised at home due to risk for future falls . Equipment: TBD OCCUPATIONAL PROFILE AND HISTORY:  
History of Present Injury/Illness (Reason for Referral): 
See H&P Past Medical History/Comorbidities: Ms. Omi Quintero  has a past medical history of Anticoagulated on Coumadin (2013), CAD (coronary artery disease) (2013), Cardiomyopathy ([facility] CHF (congestive heart failure) ([facility] [address] (2017), CKD (chronic kidney disease) stage 3, GFR 30-59 ml/min ([facility]) (7/10/2013), COPD (chronic obstructive pulmonary disease) ([address] (2014), Diabetes ([address] Essential hypertension, benign (2013), HLD (hyperlipidemia), ICD (implantable cardioverter-defibrillator) in place (10/2/2014), Iron deficiency anemia due to chronic blood loss (2009), MDS (myelodysplastic syndrome) ([address] (2011), REJI (obstructive sleep apnea)-cpap (2014), Osteoarthritis, Osteopenia, Pulmonary hypertension ([address] (6/15/2016), Quadrantanopia, Skin defect (2018), and Visual complaint (2015). She also has no past medical history of Difficult intubation, Malignant hyperthermia due to anesthesia, Nausea & vomiting, Pseudocholinesterase deficiency, or Unspecified adverse effect of anesthesia. Ms. Omi Quintero  has a past surgical history that includes cardiac catheterization (); ir bx bone marrow diagnostic (2011); hx aortic valve replacement (, ); hx  section; hx tubal ligation; hx heart catheterization (); hx coronary stent placement (May, 2014); hx pacemaker (10/2/2014); and hx endoscopy (2009). Social History/Living Environment:  
Home Environment: Apartment # Steps to Enter: 2 One/Two Story Residence: One story Living Alone:  Yes 
 Support Systems: Child(alysha) Patient Expects to be Discharged to[de-identified] [id number] Current DME Used/Available at Home: Oxygen, portable, Shower chair, Walker, rollator Tub or Shower Type: Tub/Shower combination Prior Level of Function/Work/Activity: 
Pt reports she lives alone but has a caregiver that comes in and helps with household chores and some with ADLs. Pt has meals delivered to her home. Pt has a rollator but states she doesn't use it. Pt's biggest limiting factor are the steps that come out of her home (2-3 steps) that pt reports she has repeatedly fallen down. Personal Factors:   
      Social Background:  Lives alone Past/Current Experience: multiple falls at home Other factors that influence how disability is experienced by the patient:  multiple co-morbidities Number of Personal Factors/Comorbidities that affect the Plan of Care: Extensive review of physical, cognitive, and psychosocial performance (3+):  HIGH COMPLEXITY ASSESSMENT OF OCCUPATIONAL PERFORMANCE[de-identified]  
Activities of Daily Living:  
Basic ADLs (From Assessment) Complex ADLs (From Assessment) Feeding: Stand-by assistance Oral Facial Hygiene/Grooming: Moderate assistance Bathing: Maximum assistance Upper Body Dressing: Maximum assistance Lower Body Dressing: Maximum assistance Toileting: Moderate assistance Grooming/Bathing/Dressing Activities of Daily Living Cognitive Retraining Safety/Judgement: Awareness of environment; Fall prevention;Home safety Bed/Mat Mobility Supine to Sit: Moderate assistance Sit to Supine: Supervision Sit to Stand: Minimum assistance Stand to Sit: Contact guard assistance Bed to Chair: Contact guard assistance Scooting: Minimum assistance Most Recent Physical Functioning:  
Gross Assessment: 
AROM: Generally decreased, functional(non-functional R shoulder AROM; limited in L shoulder/hand) Strength: Generally decreased, functional 
         
  
 Posture: 
Posture (WDL): Exceptions to [facility] Posture Assessment: Forward head, Rounded shoulders Balance: 
Sitting: Intact Standing: Impaired Standing - Static: Good Standing - Dynamic : Fair Bed Mobility: 
Supine to Sit: Moderate assistance Sit to Supine: Supervision Scooting: Minimum assistance Wheelchair Mobility: 
  
Transfers: 
Sit to Stand: Minimum assistance Stand to Sit: Contact guard assistance Bed to Chair: Contact guard assistance Patient Vitals for the past 6 hrs: 
 BP BP Patient Position SpO2 O2 Flow Rate (L/min) Pulse 06/08/19 1131 119/77 At rest 98 %  81  
06/08/19 1427   97 % 3 l/min Mental Status Neurologic State: Alert, Drowsy Orientation Level: Oriented X4 Cognition: Appropriate for age attention/concentration, Follows commands Perception: Appears intact Perseveration: No perseveration noted Safety/Judgement: Awareness of environment, Fall prevention, Home safety Physical Skills Involved: 
Range of Motion Balance Strength Activity Tolerance Fine Motor Control Gross Motor Control Vision Pain (acute) Pain (Chronic) Cognitive Skills Affected (resulting in the inability to perform in a timely and safe manner): 
none  Psychosocial Skills Affected: 
Habits/Routines Self-Awareness Number of elements that affect the Plan of Care: 5+:  HIGH COMPLEXITY CLINICAL DECISION MAKING:  
[employer] AM-PAC? ?6 Clicks? Daily Activity Inpatient Short Form How much help from another person does the patient currently need. .. Total A Lot A Little None 1. Putting on and taking off regular lower body clothing? ? 1   ? 2   ? 3   ? 4  
2. Bathing (including washing, rinsing, drying)? ? 1   ? 2   ? 3   ? 4  
3. Toileting, which includes using toilet, bedpan or urinal?   ? 1   ? 2   ? 3   ? 4  
4. Putting on and taking off regular upper body clothing?    ? 1   ? 2   ? 3   ? 4  
 5. Taking care of personal grooming such as brushing teeth? ? 1   ? 2   ? 3   ? 4  
6. Eating meals? ? 1   ? 2   ? 3   ? 4  
© 2007, Trustees of [facility] [employer], under license to [employer]. All rights reserved Score:  Initial: 13 Most Recent: X (Date: -- ) Interpretation of Tool:  Represents activities that are increasingly more difficult (i.e. Bed mobility, Transfers, Gait). Medical Necessity:    
Patient demonstrates good 
 rehab potential due to higher previous functional level. Reason for Services/Other Comments: 
Patient continues to require skilled intervention due to decreased independence with ADL/functional transfers Kelly Jay Use of outcome tool(s) and clinical judgement create a POC that gives a: MODERATE COMPLEXITY  
 
 
 
TREATMENT:  
(In addition to Assessment/Re-Assessment sessions the following treatments were rendered) Pre-treatment Symptoms/Complaints:   
Pain: Initial:  
Pain Intensity 1: 7 Pain Location 1: Shoulder Pain Orientation 1: Right Pain Intervention(s) 1: Repositioned  Post Session:  same Therapeutic Activity: (  10 minutes): Therapeutic activities including Bed transfers, Tub/shower transfers and sitting tolerance edge of bed  to improve mobility, strength, balance and coordination. Required minimal Safety awareness training;Verbal cues to promote dynamic balance in standing. N/a Braces/Orthotics/Lines/Etc:  
IV 
O2 Device: Nasal cannula Treatment/Session Assessment:   
Response to Treatment:  Pt tolerated well with good participation but limitations due to R UE pain. Interdisciplinary Collaboration: Occupational Therapist 
Registered Nurse After treatment position/precautions:  
Supine in bed Bed/Chair-wheels locked Call light within reach RN notified Compliance with Program/Exercises: Will assess as treatment progresses. Recommendations/Intent for next treatment session:   \"Next visit will focus on advancements to more challenging activities and reduction in assistance provided\". Total Treatment Duration: OT Patient Time In/Time Out Time In: 4956 Time Out: 1346 Ethel Forth, OT
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
## 2019-06-08 NOTE — PROGRESS NOTES
Hospitalist Progress Note 2019 Admit Date: 2019  2:50 PM  
NAME: Roberto Simmons :  1950 MRN:  [id number] Attending: Cristiano Toussaint DO 
PCP:  Tha Campo MD 
 
SUBJECTIVE:  
69 yo female with PMH significant for myelodisplastic syndrome, DM2, CKD stage 4, HTN, sHF, HLD, COPD, AVR on chronic coumadin. Pt presented to the ED via EMS with report of fatigue. Pt also states that she has missed her recent iron injections. Pt initially denied any falls but did recall a fall which injured her right arm/shoulder a few days prior. On admit it was found that pt hgb 7.1, K+ also 7.1, Cr 3.71, . Stool was FOB positive. CXR negative, EKG LBBB, axis deviation to the left. UA unremarkable. Pt admitted to hospitalist for symptomatic anemia, Ac on CKD, hyperkalemia and ? GIB 
GI, Cardiology, Hematology and Nephrology following.  - pt more alert today. Daughters at bedside. Denies pain and says she feels less tired. Daughter (who is an RN) reports a smudge of dark stool mixed with BRB on tissue paper as she was cleaning her mother up.  - pt sitting up in bedside chair. Denies acute complaints. Denies knowledge of any bloody stools. Review of Systems negative with exception of pertinent positives noted above PHYSICAL EXAM  
 
Visit Vitals /66 Pulse 76 Temp 98.2 °F (36.8 °C) Resp 18 Ht 5' 2\" (1.575 m) Wt 95.7 kg (210 lb 14.4 oz) SpO2 99% BMI 38.57 kg/m² Temp (24hrs), Av.2 °F (36.8 °C), Min:97.5 °F (36.4 °C), Max:98.5 °F (36.9 °C) Oxygen Therapy O2 Sat (%): 99 % (19 1601) Pulse via Oximetry: 90 beats per minute (19 142) O2 Device: Nasal cannula (19 142) O2 Flow Rate (L/min): 3 l/min (19 142) Intake/Output Summary (Last 24 hours) at 2019 1824 Last data filed at 2019 8996 Gross per 24 hour Intake 1312 ml Output 1300 ml Net 12 ml General: No acute distress  obese Lungs:  Diminished w/o wheezing or rhonchi Heart:  Regular rate and rhythm,systolic  click present, Abdomen: Soft, Non distended, Non tender, Positive bowel sounds Extremities: No cyanosis, clubbing or edema Neurologic:  No focal deficits ASSESSMENT Active Hospital Problems Diagnosis Date Noted  Symptomatic anemia 06/06/2019  Hyperkalemia 06/06/2019  Right shoulder pain 06/06/2019  Systolic CHF, acute on chronic ([facility] [address] 12/31/2018  Type 2 diabetes mellitus with nephropathy ([facility] [address] 12/18/2017  S/P AVR (aortic valve replacement) Mechanical/Artific  HLD (hyperlipidemia)  COPD (chronic obstructive pulmonary disease) ([facility] [address] 04/02/2014 HOME OXYGEN 3 LITERS 
  
 CKD (chronic kidney disease) stage 4, GFR 15-29 ml/min ([facility]) 07/10/2013  Essential hypertension, benign 01/20/2013  MDS (myelodysplastic syndrome) ([facility] [address] 12/17/2011 Procrit started in August, 2011 12/18/11 Procrit weekly and Iron stores. 5-12 12-13-12 good response to 3 weekly procrit did not need it last time 3-7-13 Pt doing well. Just wanted a \"check-up. \" Responding to Procrit every three weeks. 8-29-13 patient has missed some injections on recently restarted hemoglobin only issue , takes oral iron iron stores each time A/p - Symptomatic anemia - likely multifactorial from hx of MDS and now GIB. Heme/ GI following. 1 unit prbc 6/6 for hgb 7. 1. hgb 8.5 yesterday, 7.9 today - GI bleed - occ pos in ER. Report of red blood on tissue. GI following. Cont IV protonix. Continue serial h/h. Plan for egd/colo per GI once INR trended down with hep bridge - likely Monday or tuesday - MDS - heme/onc has seen. Ordered procrit and iron labs 
 
- mechanical AVR - INR 2.2 this AM, start hep gtt. Holding oac. - acute on CKD/ hyperkalemia - stage 4. Nephrology following. Renal US wnl. SLOW ivf. Repeat in Am. Holding lasix and entresto. - hypernatremia - change to hypotonic fluid and monitor. - systolic CHF - as above. Appears euvolemic. Monitor closely as slow IVF running for FELIZ. - right shoulder pain - s/p fall. xrays neg for fx.  
 
- COPD - stable, supplemental ox prn. Cont home nebs DVT Prophylaxis: hep gtt bridge Signed By: Fatuma Stone, DO   
 June 8, 2019

## 2019-06-08 NOTE — PROGRESS NOTES
Problem: Mobility Impaired (Adult and Pediatric) Goal: *Acute Goals and Plan of Care (Insert Text) Description LTG: 
(1.)Ms. Diaz will move from supine to sit and sit to supine  in bed with MODIFIED INDEPENDENCE within 5 treatment day(s). (2.)Ms. Diaz will transfer from bed to chair and chair to bed with MODIFIED INDEPENDENCE using the least restrictive device within 5 treatment day(s). (3.)Ms. Diaz will ambulate with MODIFIED INDEPENDENCE for 50 feet with the least restrictive device within 5 treatment day(s). ________________________________________________________________________________________________ Outcome: Progressing Towards Goal 
 
 
PHYSICAL THERAPY: Initial Assessment 6/8/2019 INPATIENT:   
[place] / [place] / Product Type: Medicare /   
  
NAME/AGE/GENDER: Maryam Cantu is a 68 y.o. female PRIMARY DIAGNOSIS: Symptomatic anemia [D64.9] Symptomatic anemia Symptomatic anemia ICD-10: Treatment Diagnosis:  
 Generalized Muscle Weakness (M62.81) Difficulty in walking, Not elsewhere classified (R26.2) Repeated Falls (R29.6) Precaution/Allergies: 
Sulfa (sulfonamide antibiotics) and Aspirin ASSESSMENT:  
 
Ms. Diaz presents sitting in bedside chair and is agreeable to PT. Patient reports 3 falls in the last 1-2 months. She states she lives independently, is on home O2, has a rollator but doesn't use it. She required moderate assistance to stand from bedside chair but only needed CGA to supervision once up. She ambulated in the room with some cuing needed for the O2 cord. She was slightly unsteady. She may benefit from an assistive device and I spoke to her about this. She was receptive. Will do a trial of a cane vs a walker to determine the best assistive device. She would benefit from acute skilled PT to improve her functional mobility, decrease her risk of falls, and return to her PLOF. This section established at most recent assessment PROBLEM LIST (Impairments causing functional limitations): 
Decreased Strength Decreased ADL/Functional Activities Decreased Transfer Abilities Decreased Ambulation Ability/Technique Decreased Balance Increased Pain Decreased Activity Tolerance Decreased [place] with Home Exercise Program 
 INTERVENTIONS PLANNED: (Benefits and precautions of physical therapy have been discussed with the patient.) Balance Exercise Family Education Gait Training Group Therapy Home Exercise Program (HEP) Therapeutic Activites Therapeutic Exercise/Strengthening Transfer Training TREATMENT PLAN: Frequency/Duration: 3 times a week for duration of hospital stay Rehabilitation Potential For Stated Goals: Excellent REHAB RECOMMENDATIONS (at time of discharge pending progress):   
Placement: It is my opinion, based on this patient's performance to date, that Ms. Kacie Ferreira may benefit from participating in 1-2 additional therapy sessions in order to continue to assess for rehab potential and then make recommendation for disposition at discharge. Equipment: To be determined after AD trial   
    
 
 
 
HISTORY:  
History of Present Injury/Illness (Reason for Referral): 
Pt is a 69 yo female with PMH significant for myelodisplastic syndrome, DM2, CKD stage 4, HTN, sHF, HLD, COPD, AVR on chronic coumadin. Pt presented to the ED via EMS with report of fatigue. Pt reports being too weak to get up this am.  Pt also states that she has missed her recent iron injections. Pt initially denied any falls but did recall a fall which injured her right arm/shoulder. She denies fever, CP, sob. She denies any n/v/d/c. Pt denies seeing any blood in her stool. On admit it was found that pt hgb 7.1, K+ also 7.1, Cr 3.71, . Stool was FOB positive.  CXR negative, EKG LBBB, axis deviation to the left. UZ ordered not yet obtained. Pt getting calcium gluconate, D50 and insulin - will then recheck potassium. 10 systems reviewed and negative except as noted in HPI. Past Medical History/Comorbidities: Ms. Wes Banda  has a past medical history of Anticoagulated on Coumadin (2013), CAD (coronary artery disease) (2013), Cardiomyopathy ([facility] [address] CHF (congestive heart failure) ([facility] [address] (2017), CKD (chronic kidney disease) stage 3, GFR 30-59 ml/min ([facility]) (7/10/2013), COPD (chronic obstructive pulmonary disease) ([facility] [address] (2014), Diabetes ([facility] [address] Essential hypertension, benign (2013), HLD (hyperlipidemia), ICD (implantable cardioverter-defibrillator) in place (10/2/2014), Iron deficiency anemia due to chronic blood loss (2009), MDS (myelodysplastic syndrome) ([facility] [address] (2011), REJI (obstructive sleep apnea)-cpap (2014), Osteoarthritis, Osteopenia, Pulmonary hypertension ([address] (6/15/2016), Quadrantanopia, Skin defect (2018), and Visual complaint (2015). She also has no past medical history of Difficult intubation, Malignant hyperthermia due to anesthesia, Nausea & vomiting, Pseudocholinesterase deficiency, or Unspecified adverse effect of anesthesia. Ms. Wes Banda  has a past surgical history that includes cardiac catheterization (); ir bx bone marrow diagnostic (2011); hx aortic valve replacement (, ); hx  section; hx tubal ligation; hx heart catheterization (); hx coronary stent placement (May, 2014); hx pacemaker (10/2/2014); and hx endoscopy (2009). Social History/Living Environment:  
Home Environment: Apartment # Steps to Enter: 3 One/Two Story Residence: One story Living Alone: Yes Support Systems: Child(alysha) Patient Expects to be Discharged to[de-identified] [facility] Current DME Used/Available at Home: Oxygen, portable, Walker, rolling, Cane, straight Prior Level of Function/Work/Activity: Modified independent to independent at baseline. Doesn't drive. Has a rollator. Number of Personal Factors/Comorbidities that affect the Plan of Care: 1-2: MODERATE COMPLEXITY EXAMINATION:  
Most Recent Physical Functioning:  
Gross Assessment: 
AROM: Generally decreased, functional 
PROM: Generally decreased, functional 
Strength: Generally decreased, functional 
Sensation: Intact Posture: 
Posture (WDL): Exceptions to [facility] Posture Assessment: Forward head, Rounded shoulders Balance: 
Sitting: Intact Standing: Impaired Standing - Static: Good Standing - Dynamic : Fair Bed Mobility: 
Sit to Supine: Supervision Scooting: Supervision Wheelchair Mobility: 
  
Transfers: 
Sit to Stand: Moderate assistance(mod A from chair. supervison from bed) Stand to Sit: Supervision Gait: 
  
Base of Support: Widened Speed/Winifred: Shuffled; Slow Gait Abnormalities: Decreased step clearance Distance (ft): 10 Feet (ft) Ambulation - Level of Assistance: Contact guard assistance Interventions: Safety awareness training;Verbal cues Body Structures Involved: 
Bones Joints Muscles Ligaments Body Functions Affected: 
Neuromusculoskeletal 
Movement Related Activities and Participation Affected: Mobility Self Care Domestic Life Interpersonal Interactions and Relationships Number of elements that affect the Plan of Care: 4+: HIGH COMPLEXITY CLINICAL PRESENTATION:  
Presentation: Stable and uncomplicated: LOW COMPLEXITY CLINICAL DECISION MAKING:  
[employer] AM-PAC? ?6 Clicks? Basic Mobility Inpatient Short Form How much difficulty does the patient currently have. .. Unable A Lot A Little None 1. Turning over in bed (including adjusting bedclothes, sheets and blankets)? ? 1   ? 2   ? 3   ? 4  
2. Sitting down on and standing up from a chair with arms ( e.g., wheelchair, bedside commode, etc.)   ?  1   ? 2   ? 3   ? 4  
 3. Moving from lying on back to sitting on the side of the bed?   ? 1   ? 2   ? 3   ? 4 How much help from another person does the patient currently need. .. Total A Lot A Little None 4. Moving to and from a bed to a chair (including a wheelchair)? ? 1   ? 2   ? 3   ? 4  
5. Need to walk in hospital room? ? 1   ? 2   ? 3   ? 4  
6. Climbing 3-5 steps with a railing? ? 1   ? 2   ? 3   ? 4  
© 2007, Trustees of [address] [place] [address] [address] [id number], under license to [employer]. All rights reserved Score:  Initial: 18 Most Recent: X (Date: -- ) Interpretation of Tool:  Represents activities that are increasingly more difficult (i.e. Bed mobility, Transfers, Gait). Medical Necessity:    
Patient demonstrates excellent 
 rehab potential due to higher previous functional level. Reason for Services/Other Comments: 
Patient continues to require modification of therapeutic interventions to increase complexity of exercises [employer] [employer] Use of outcome tool(s) and clinical judgement create a POC that gives a: Clear prediction of patient's progress: LOW COMPLEXITY  
  
 
 
 
TREATMENT:  
(In addition to Assessment/Re-Assessment sessions the following treatments were rendered) Pre-treatment Symptoms/Complaints:  pain in her R side from a fall Pain: Initial:  
Pain Intensity 1: 3 Pain Location 1: Other (comment)(whole R side) Pain Orientation 1: Right Pain Intervention(s) 1: Repositioned  Post Session:  0/10 Therapeutic Activity: (    10 minutes): Therapeutic activities including Bed transfers, Chair transfers and Ambulation on level ground to improve mobility, strength and balance. Required moderate Safety awareness training;Verbal cues to promote static and dynamic balance in standing. Braces/Orthotics/Lines/Etc:  
IV 
O2 Device: Nasal cannula Treatment/Session Assessment:   
Response to Treatment:  patient left supine in bed with needs in reach Interdisciplinary Collaboration:  
Registered Nurse After treatment position/precautions:  
Supine in bed Bed/Chair-wheels locked Bed in low position Call light within reach Compliance with Program/Exercises: Will assess as treatment progresses Recommendations/Intent for next treatment session: \"Next visit will focus on advancements to more challenging activities and reduction in assistance provided\". Total Treatment Duration: PT Patient Time In/Time Out Time In: 8236 Time Out: 2549 Philomena Rosenberg DPT

## 2019-06-08 NOTE — PROGRESS NOTES
Hourly rounds completed. Patient alert and oriented. Ambulating to bathroom. Tolerating diet. Heparin gtt started at 12 units/kg/hr at 1215. PTT to be drawn at 1815. Will adjust gtt per protocol. No needs at this time. Will continue to monitor and report to oncoming RN.

## 2019-06-08 NOTE — PROGRESS NOTES
[facility] Subjective: A on CKD  Feeling a little better today. Review of Systems -  
General ROS: negative for - fever, chills Respiratory ROS: no SOB, cough, PRUETT Cardiovascular ROS: no CP, palpitations Gastrointestinal ROS: no N&V, abdominal pain, diarrhea Genito-Urinary ROS: no difficulty voiding, dysuria Neurological ROS: no seizures, focal weekness Objective: 
 
Vitals:  
 06/07/19 8616 06/08/19 0422 06/08/19 0621 06/08/19 9382 BP: 118/74 127/70 114/72 Pulse: 75 82 81 Resp: 18 18 19 Temp: 98.3 °F (36.8 °C) 97.5 °F (36.4 °C) 98.1 °F (36.7 °C) SpO2: 95% 92% 100% 99% Weight:  95.7 kg (210 lb 14.4 oz) Height:      
 
 
PE 
Gen: in no acute distress CV:reg rate Chest:clear Abd: soft Ext/Access: trace - 1+ edema [facility] LAB Recent Labs  
  06/08/19 
0515 06/07/19 
2112 06/07/19 
1621  06/07/19 
0315 06/06/19 
1547 WBC 5.6  --   --   --  7.0 7.4 HGB 7.9* 7.8* 8.4*   < > 8.7* 7.1*  
HCT 25.6* 25.2* 28.1*   < > 27.5* 22.6*  
*  --   --   --  146* 166 INR  --   --   --   --  3.2 3.6*  
 < > = values in this interval not displayed. Recent Labs  
  06/08/19 
0515 06/07/19 
0315 06/06/19 
1909 06/06/19 
1547 * 144  --  139  
K 5.4* 5.3* 4.9 7.1*  
* 108*  --  104 CO2 26 29  --  29  
GLU 90 124*  --  94 BUN 89* 112*  --  116* CREA 2.67* 3.35*  --  3.71* CA 9.7 9.7  --  9.4 ALB  --   --   --  3.5 TBILI  --   --   --  0.6 ALT  --   --   --  19 SGOT  --   --   --  39* Radiology A/P:  
Patient Active Problem List  
Diagnosis Code  Iron deficiency anemia due to chronic blood loss D50.0  MDS (myelodysplastic syndrome) ([facility]) D46.9  CAD (coronary artery disease) I25.10  Chronic combined systolic and diastolic heart failure ([facility]) I50.42  
 Essential hypertension, benign I10  
 Anticoagulated on Coumadin Z79.01  
 CKD (chronic kidney disease) stage 4, GFR 15-29 ml/min ([facility]) N18.4  COPD (chronic obstructive pulmonary disease) ([facility]) J44.9  REJI (obstructive sleep apnea)-cpap G47.33  
 ICD (implantable cardioverter-defibrillator) in place Z95.810  
 Osteopenia M85.80  
 HLD (hyperlipidemia) E78.5  Osteoarthritis M19.90  
 S/P AVR (aortic valve replacement) Z95.2  Cardiomyopathy ([address] I42.9  Type 2 diabetes mellitus with nephropathy ([facility]) E11.21  
 Closed nondisplaced fracture of shaft of fifth metacarpal bone of left hand S62.357A  Physical debility R53.81  
 Encounter for immunization Z23  Anemia D64.9  Chronic respiratory failure with hypoxia ([facility]) J96.11  
 Debility R53.81  
 Acute exacerbation of CHF (congestive heart failure) ([facility]) I50.9  Systolic CHF, acute on chronic ([facility]) I50.23  
 Acute on chronic combined systolic and diastolic CHF (congestive heart failure) ([facility]) I50.43  
 Skin defect L98.9  Severe obesity ([facility]) E66.01  
 Excessive granulation tissue L92.9  Symptomatic anemia D64.9  Hyperkalemia E87.5  Right shoulder pain M25.511 A on CKD -creatinine is coming down. Renal US was unremarkable. Hyperkalemai - mildly elevated. If it stays in this range, may consider [facility] An[facility] CHF Terri Zhu MD

## 2019-06-08 NOTE — PROGRESS NOTES
Pt slept soundly throughout night. 1300 ml cleer yellow urine. 1312 ml NS intake. Removed home CPAP this am per pt request. 
 
Hourly rounds performed through shift, pt denies needs at this time. Bed in low position and call light/ personal items within reach. Will continue to monitor and report to day shift nurse.

## 2019-06-09 NOTE — PROGRESS NOTES
[facility] Subjective: A on CKD  Feeling better today. Review of Systems -  
General ROS: negative for - fever, chills Respiratory ROS: no SOB, cough, PRUETT Cardiovascular ROS: no CP, palpitations Gastrointestinal ROS: no N&V, abdominal pain, diarrhea Genito-Urinary ROS: no difficulty voiding, dysuria Neurological ROS: no seizures, focal weekness Objective: 
 
Vitals:  
 06/09/19 9361 06/09/19 0507 06/09/19 1337 06/09/19 3364 BP:  121/65  97/55 Pulse:  69  73 Resp:  20  18 Temp:  98.1 °F (36.7 °C)  98 °F (36.7 °C) SpO2: 97% 98% 95% 97% Weight:  99.2 kg (218 lb 12.8 oz) Height:      
 
 
PE 
Gen: in no acute distress CV:reg rate Chest:clear Abd: soft Ext/Access: trace - 1+ edema Kelly [facility] LAB Recent Labs  
  06/09/19 
0205 06/08/19 
2004 06/08/19 
1055 06/08/19 
0515  06/07/19 
0315 WBC 6.5  --   --  5.6  --  7.0 HGB 7.2* 7.7*  --  7.9*   < > 8.7* HCT 23.3* 24.9*  --  25.6*   < > 27.5*  
*  --   --  125*  --  146* INR 2.1  --  2.2  --   --  3.2  
 < > = values in this interval not displayed. Recent Labs  
  06/09/19 
0205 06/08/19 
0515 06/07/19 0315  06/06/19 
1547  146* 144  --  139  
K 5.6* 5.4* 5.3*   < > 7.1*  
* 114* 108*  --  104 CO2 28 26 29  --  29 * 90 124*  --  94 BUN 74* 89* 112*  --  116* CREA 2.46* 2.67* 3.35*  --  3.71* CA 9.2 9.7 9.7  --  9.4 ALB  --   --   --   --  3.5 TBILI  --   --   --   --  0.6 ALT  --   --   --   --  19 SGOT  --   --   --   --  39*  
 < > = values in this interval not displayed. Radiology A/P:  
Patient Active Problem List  
Diagnosis Code  Iron deficiency anemia due to chronic blood loss D50.0  MDS (myelodysplastic syndrome) ([facility]) D46.9  CAD (coronary artery disease) I25.10  Chronic combined systolic and diastolic heart failure ([facility]) I50.42  
 Essential hypertension, benign I10  
 Anticoagulated on Coumadin Z79.01  
  CKD (chronic kidney disease) stage 4, GFR 15-29 ml/min ([facility]) N18.4  COPD (chronic obstructive pulmonary disease) ([facility]) J44.9  REJI (obstructive sleep apnea)-cpap G47.33  
 ICD (implantable cardioverter-defibrillator) in place Z95.810  
 Osteopenia M85.80  
 HLD (hyperlipidemia) E78.5  Osteoarthritis M19.90  
 S/P AVR (aortic valve replacement) Z95.2  Cardiomyopathy ([address] I42.9  Type 2 diabetes mellitus with nephropathy ([facility]) E11.21  
 Closed nondisplaced fracture of shaft of fifth metacarpal bone of left hand S62.357A  Physical debility R53.81  
 Encounter for immunization Z23  Anemia D64.9  Chronic respiratory failure with hypoxia ([facility]) J96.11  
 Debility R53.81  
 Acute exacerbation of CHF (congestive heart failure) ([facility]) I50.9  Systolic CHF, acute on chronic ([facility]) I50.23  
 Acute on chronic combined systolic and diastolic CHF (congestive heart failure) ([facility]) I50.43  
 Skin defect L98.9  Severe obesity ([facility]) E66.01  
 Excessive granulation tissue L92.9  Symptomatic anemia D64.9  Hyperkalemia E87.5  Right shoulder pain M25.511 A on CKD -creatinine is coming down. Renal US was unremarkable. Hyperkalemai - remaining elevated. Will start [facility] Anemia CHF Cynthia Taylor MD

## 2019-06-09 NOTE — PROGRESS NOTES
[facility] CARDIOLOGY PROGRESS NOTE 
      
 
6/9/2019 12:31 PM 
 
Admit Date: 6/6/2019 Subjective:  
 
Patient has no complaints Somnolent this morning CPAP in place Review of Systems Cardiovascular: Negative for chest pain. Objective:  
  
Vitals:  
 06/09/19 0993 06/09/19 0507 06/09/19 4948 06/09/19 9790 BP:  121/65  97/55 Pulse:  69  73 Resp:  20  18 Temp:  98.1 °F (36.7 °C)  98 °F (36.7 °C) SpO2: 97% 98% 95% 97% Weight:  99.2 kg (218 lb 12.8 oz) Height:      
 
 
 
Physical Exam  
Constitutional: She appears lethargic. HENT:  
Head: Normocephalic and atraumatic. Eyes: Pupils are equal, round, and reactive to light. Conjunctivae are normal.  
Neck: Normal range of motion. No JVD present. Cardiovascular: Normal rate. Murmur heard. Pulmonary/Chest: Effort normal. She has decreased breath sounds in the right lower field and the left lower field. Abdominal: She exhibits no distension. Neurological: She appears lethargic. Skin: Skin is warm. She is not diaphoretic. Psychiatric: Her affect is blunt. Data Review:  
Recent Labs  
  06/09/19 
0205 06/08/19 
2004 06/08/19 
1055 06/08/19 
0515   --   --  146*  
K 5.6*  --   --  5.4*  
BUN 74*  --   --  89* CREA 2.46*  --   --  2.67* *  --   --  90 WBC 6.5  --   --  5.6 HGB 7.2* 7.7*  --  7.9*  
HCT 23.3* 24.9*  --  25.6*  
*  --   --  125* INR 2.1  --  2.2  -- Intake/Output Summary (Last 24 hours) at 6/9/2019 0935 Last data filed at 6/9/2019 9525 Gross per 24 hour Intake 795 ml Output  Net 795 ml Current Facility-Administered Medications Medication Dose Route Frequency  0.45% sodium chloride infusion  100 mL/hr IntraVENous CONTINUOUS  
 heparin 25,000 units in dextrose 500 mL infusion  12-25 Units/kg/hr IntraVENous TITRATE  pantoprazole (PROTONIX) 40 mg in sodium chloride 0.9% 10 mL injection  40 mg IntraVENous Q12H  albuterol (PROVENTIL VENTOLIN) nebulizer solution 2.5 mg  2.5 mg Nebulization Q4H PRN  
 carvedilol (COREG) tablet 6.25 mg  6.25 mg Oral BID WITH MEALS  isosorbide mononitrate ER (IMDUR) tablet 30 mg  30 mg Oral DAILY  sertraline (ZOLOFT) tablet 50 mg  50 mg Oral DAILY  simvastatin (ZOCOR) tablet 20 mg  20 mg Oral QHS  traZODone (DESYREL) tablet 50 mg  50 mg Oral QHS  vitamin E (AQUA GEMS) capsule 400 Units (Patient Supplied)  400 Units Oral DAILY  sodium chloride (NS) flush 5-40 mL  5-40 mL IntraVENous Q8H  
 sodium chloride (NS) flush 5-40 mL  5-40 mL IntraVENous PRN  
 acetaminophen (TYLENOL) tablet 650 mg  650 mg Oral Q4H PRN  
 HYDROcodone-acetaminophen (NORCO) 5-325 mg per tablet 1 Tab  1 Tab Oral Q4H PRN  
 naloxone (NARCAN) injection 0.4 mg  0.4 mg IntraVENous PRN  
 ondansetron (ZOFRAN) injection 4 mg  4 mg IntraVENous Q4H PRN  
 bisacodyl (DULCOLAX) tablet 10 mg  10 mg Oral DAILY PRN  
 0.9% sodium chloride infusion 250 mL  250 mL IntraVENous PRN  
 budesonide (PULMICORT) 500 mcg/2 ml nebulizer suspension  500 mcg Nebulization BID RT And  
 albuterol (PROVENTIL VENTOLIN) nebulizer solution 2.5 mg  2.5 mg Nebulization Q6HWA RT  
 diphenhydrAMINE (BENADRYL) capsule 25 mg  25 mg Oral Q4H PRN Assessment/Plan:  
 
History of chronic kidney disease stage IV COPD and myelodysplastic syndrome with anemia PCI to LAD 2014 status post mechanical aortic valve replacement on chronic warfarin therapy with severe left ventricular systolic dysfunction with EF 25%. Status post [employer] ICD. Admitted for severe weakness and anemia 1. Symptomatic anemia most likely multifactorial oncology addressing. Worse today. 2. Hypertension hypotensive hold imdur today 3. Cardiomyopathy on carvedilol afterload reducing agents isosorbide and hydralazine. Not a candidate for ace or arb  therapy due to kidney disease. Weight up lasix today 4. Mechanical valve in aortic position initiated on heparin drip see guiidlines listed below. Difficult situation due to anemia Continuation of VKA anticoagulation with a therapeutic INR is recommended in patients with mechanical heart valves 
undergoing minor procedures (such as dental extractions or cataract removal) where bleeding is easily controlled. Temporary interruption of VKA anticoagulation, without bridging agents while the INR is subtherapeutic, is recommended in patients with a bileaflet mechanical AVR and no other risk factors for thrombosis who are undergoing invasive or surgical procedures. Bridging therapy with either intravenous unfractionated heparin or low-molecular-weight heparin has evolved empirically to reduce thromboembolic 
events during temporary interruption of oral anticoagulation in higher-risk patients, such as those with a mechanical MVR or AVR and additional risk 
factors for thromboembolism (eg, AF, previous thromboembolism, hypercoagulable condition, older-generation mechanical valves [ball-cage or tilting 
disc], LV systolic dysfunction, or >1 mechanical valve). When interruption of oral VKA therapy is deemed necessary, the agent is usually stopped 3 to 4 days before the procedure (so the INR falls to 
<1.5 for major surgical procedures) and is restarted postoperatively as soon as bleeding risk allows, typically 12 to 24 hours after surgery. Bridging 
anticoagulation with intravenous unfractionated heparin or subcutaneous low-molecular-weight heparin is started when the INR falls below the 
therapeutic threshold (ie, 2.0 or 2.5, depending on the clinical context), usually 36 to 48 hours before surgery, and is stopped 4 to 6 hours (for 
intravenous unfractionated heparin) or 12 hours (for subcutaneous low-molecular-weight heparin) before the procedure.  
There are no randomized comparative-effectiveness trials evaluating a strategy of bridging versus no bridging in adequate numbers of patients 
with prosthetic heart valves needing temporary interruption of oral anticoagulant therapy, although such studies are ongoing. The evidence used to 
support bridging therapy derives from cohort studies with poor or no comparator groups. 2  In patient groups other than those with mechanical 
heart valves, increasing concerns have surfaced that bridging therapy exposes patients to higher bleeding risks without reducing the risk of 
thromboembolism. 199 Accordingly, decisions about bridging should be individualized and should account for the trade-offs between thrombosis and 
bleeding. 2017 AHA/ACC Focused Update of the 
2014 AHA/ACC Guideline for the Management 
of Patients With Valvular Heart Disease Twyla Berkowitz MD 
6/9/2019 12:31 PM

## 2019-06-09 NOTE — PROGRESS NOTES
[facility] Progress Note Admit Date:  6/6/2019 Today's Date:  6/9/2019 CC:  GI Bleeding Subjective:  
 
Patient denies any bowel movements today. No stools or GI bleeding reported. Denies abdominal pain or nausea. Feels better. INR 2.1. Continues on Heparin. Medications:  
Current Facility-Administered Medications Medication Dose Route Frequency  furosemide (LASIX) injection 40 mg  40 mg IntraVENous ONCE  polyethylene glycol (MIRALAX) packet 17 g  17 g Oral BID  patiromer calcium sorbitex (VELTASSA) powder 8.4 g  8.4 g Oral DAILY  0.45% sodium chloride infusion  100 mL/hr IntraVENous CONTINUOUS  
 heparin 25,000 units in dextrose 500 mL infusion  12-25 Units/kg/hr IntraVENous TITRATE  pantoprazole (PROTONIX) 40 mg in sodium chloride 0.9% 10 mL injection  40 mg IntraVENous Q12H  
 albuterol (PROVENTIL VENTOLIN) nebulizer solution 2.5 mg  2.5 mg Nebulization Q4H PRN  
 carvedilol (COREG) tablet 6.25 mg  6.25 mg Oral BID WITH MEALS  sertraline (ZOLOFT) tablet 50 mg  50 mg Oral DAILY  simvastatin (ZOCOR) tablet 20 mg  20 mg Oral QHS  traZODone (DESYREL) tablet 50 mg  50 mg Oral QHS  vitamin E (AQUA GEMS) capsule 400 Units (Patient Supplied)  400 Units Oral DAILY  sodium chloride (NS) flush 5-40 mL  5-40 mL IntraVENous Q8H  
 sodium chloride (NS) flush 5-40 mL  5-40 mL IntraVENous PRN  
 acetaminophen (TYLENOL) tablet 650 mg  650 mg Oral Q4H PRN  
 HYDROcodone-acetaminophen (NORCO) 5-325 mg per tablet 1 Tab  1 Tab Oral Q4H PRN  
 naloxone (NARCAN) injection 0.4 mg  0.4 mg IntraVENous PRN  
 ondansetron (ZOFRAN) injection 4 mg  4 mg IntraVENous Q4H PRN  
 bisacodyl (DULCOLAX) tablet 10 mg  10 mg Oral DAILY PRN  
 0.9% sodium chloride infusion 250 mL  250 mL IntraVENous PRN  
 budesonide (PULMICORT) 500 mcg/2 ml nebulizer suspension  500 mcg Nebulization BID RT  And  
 albuterol (PROVENTIL VENTOLIN) nebulizer solution 2.5 mg  2.5 mg Nebulization Q6HWA RT  
 diphenhydrAMINE (BENADRYL) capsule 25 mg  25 mg Oral Q4H PRN Review of Systems: ROS was obtained, with pertinent positives as listed above. No chest pain or SOB. On O2 by n/c. Diet:  Cardiac regular. Objective:  
Vitals: 
Visit Vitals BP 97/55 (BP 1 Location: Left arm, BP Patient Position: At rest) Pulse 73 Temp 98 °F (36.7 °C) Resp 18 Ht 5' 2\" (1.575 m) Wt 99.2 kg (218 lb 12.8 oz) SpO2 97% BMI 40.02 kg/m² Intake/Output: 
06/09 0701 - 06/09 1900 In: 120 [P.O.:120] Out: -  
06/07 1901 - 06/09 0700 In: 1987 [I.V.:1987] Out: 1300 [Urine:1300] Exam: 
General appearance: alert, cooperative, no distress Lungs: clear to auscultation bilaterally anteriorly Heart: regular rate and rhythm, + click. Abdomen: soft, non-tender. Bowel sounds normal. No masses, no organomegaly Extremities: no cyanosis. trace edema. Neuro:  alert and oriented Data Review (Labs):   
Recent Labs  
  06/09/19 
0205 06/08/19 
2004 06/08/19 
1055 06/08/19 
0515 06/07/19 
2112 06/07/19 
1621 06/07/19 
0932 06/07/19 
0315 06/06/19 
1909 06/06/19 
1547 WBC 6.5  --   --  5.6  --   --   --  7.0  --  7.4 HGB 7.2* 7.7*  --  7.9* 7.8* 8.4* 8.5* 8.7*  --  7.1*  
HCT 23.3* 24.9*  --  25.6* 25.2* 28.1* 27.2* 27.5*  --  22.6*  
*  --   --  125*  --   --   --  146*  --  166 MCV 92.5  --   --  91.1  --   --   --  89.3  --  89.3   --   --  146*  --   --   --  144  --  139  
K 5.6*  --   --  5.4*  --   --   --  5.3* 4.9 7.1*  
*  --   --  114*  --   --   --  108*  --  104 CO2 28  --   --  26  --   --   --  29  --  29 BUN 74*  --   --  89*  --   --   --  112*  --  116* CREA 2.46*  --   --  2.67*  --   --   --  3.35*  --  3.71* CA 9.2  --   --  9.7  --   --   --  9.7  --  9.4 *  --   --  90  --   --   --  124*  --  94  
AP  --   --   --   --   --   --   --   --   --  56 SGOT  --   --   --   --   --   --   --   --   --  39*  
 ALT  --   --   --   --   --   --   --   --   --  19  
TBILI  --   --   --   --   --   --   --   --   --  0.6 ALB  --   --   --   --   --   --   --   --   --  3.5 TP  --   --   --   --   --   --   --   --   --  7.2 PTP 23.3*  --  24.1*  --   --   --   --  32.3*  --  35.3* INR 2.1  --  2.2  --   --   --   --  3.2  --  3.6* APTT 164.9* >200.0*  --   --   --   --   --   --   --   --   
 
 
Assessment:  
 
Principal Problem: 
  Symptomatic anemia (6/6/2019) Active Problems: 
  MDS (myelodysplastic syndrome) ([facility] (12/17/2011) Overview: Procrit started in August, 2011 12/18/11 Procrit weekly and Iron stores. 5-12 12-13-12 good response to 3 weekly procrit did not need it last time 3-7-13 Pt doing well. Just wanted a \"check-up. \" Responding to Procrit  
    every three weeks. 8-29-13 patient has missed some injections on recently restarted  
    hemoglobin only issue , takes oral iron iron stores each time Essential hypertension, benign (1/20/2013) CKD (chronic kidney disease) stage 4, GFR 15-29 ml/min ([facility]) (7/10/2013) COPD (chronic obstructive pulmonary disease) ([facility] (4/2/2014) Overview: HOME OXYGEN 3 LITERS 
 
  HLD (hyperlipidemia) () 
 
  S/P AVR (aortic valve replacement) () Overview: Mechanical/Artific Type 2 diabetes mellitus with nephropathy ([facility] (12/18/2017) Systolic CHF, acute on chronic ([facility] (12/31/2018) Hyperkalemia (6/6/2019) Right shoulder pain (6/6/2019) Plan:  
 68 y.o. female with PMH of CKD stage IV, COPD on trilogy at night and 3L O2 durign day, myelodysplastic syndrome w anemia, htn, hld, and CAD w cardiac arrest 5-2014 w LHC and PCI to LAD, cardiomyopathy s/p mech AVR (on coumadin) w echo  w EF 25% w severe diffuse hypokinesis (worse apical and septal) w [facility] AVR and mild MR- s/p Biotronik ICD, who presented to the ED with complaints of frequent falls and weakness. She is seen in consultation at the request of Mary Jo Herrera NP for GIB. Patient reports no prior GI evaluation and no significant GI sx. Denied overt bleeding but was noted to have HO + stools and smudge of red blood with bowel movement. She  received 1 unit of PRBCs and Hgb increased from 7.1 to 8.7 but is again down to 7.2 (6/9/19). INR 6/9 2.1 (down from 4.3 on 6/3). On Heparin IV. Additional anemia evaluation reveals:  iron 78, folate 36, B12 963, TIBC 239, saturation 33%, jhon 81. Hematology, Nephrology, Cardiology following. Anemia is likely multifactorial considering coumadin use, CKD with increased creatinine, and myelodysplastic syndrome. Hemoccult positive stools in presence of INR >4. Plan Colonoscopy +/- EGD when INR < 1.5 - 1.8, through a Heparin window. Plan:  
 
Monitor H/H and transfuse prn per Hem Onc. Coumadin held-  On Heparin anticoagulation due to mechanical valve. Cardiology recommendations noted. Orders for clear liquids after breakfast cancelled and cardiac regular texture diet resumed. Plan Colonoscopy +/- EGD with INR hopefully < 1.5 - 1.8, through a Heparin window. Will give Miralax to increase bowel activities and assist with bowel prep when ordered. Discussed with patient and her daughter Cathy Calderón RN working on this 6th floor today. Dr. Zhao Jenkins and inpatient rounding team to follow up starting tomorrow.  
 
Melvin Dumont MD

## 2019-06-09 NOTE — PROGRESS NOTES
After order was placed for Ferrlecit I was informed that s/p her last dose she suffered a MI and was in CCU. She obviously refused this order and now allergy is placed on chart. She should continue with po iron and will follow up with Dr Nazanin Dang and with next Procrit after discharge. We will sign off. Please call with any questions.

## 2019-06-09 NOTE — PROGRESS NOTES
Pt placed on home trilogy with 3 liters of oxygen bled in. Pt has on full face mask. No redness or pressure ulcers noted. Unit plugged in red out let. Alarms are all functioning. No complications noted at this time. 06/08/19 2244 Oxygen Therapy O2 Sat (%) 93 % Pulse via Oximetry 75 beats per minute O2 Device CPAP mask O2 Flow Rate (L/min) 3 l/min FIO2 (%) 32 % Respiratory Respiratory (WDL) X  
CPAP/BIPAP  
CPAP/BIPAP Start/Stop On Device Mode AVAP AVAP Set Resp Rate 5 AVAP Set VT (ml) 500 Mask Type and Size Full face Skin Condition intact (no redness) PIP Observed 25 cm H20 Inspiratory Time (sec) 1 seconds Vt Spont (ml) 494 ml Ve Observed (l/min) 8.5 l/min Backup Rate 12 Total RR (Spontaneous) 15 breaths per minute Insp Rise Time (sec) 4 Leak (Estimated) 54 L/min  
Pt's Home Machine Yes (type/vendor) (home trilogy) Settings Verified Yes Pulmonary Toilet Pulmonary Toilet H. O.B elevated

## 2019-06-09 NOTE — PROGRESS NOTES
Pt slept very soundly. No s/sx of respiratory distress. Hourly rounds performed through shift, pt denies needs at this time. Bed in low position and call light/ personal items within reach. Will continue to monitor and report to day shift nurse.

## 2019-06-09 NOTE — PROGRESS NOTES
Problem: Falls - Risk of 
Goal: *Absence of Falls Description Document Tia Manus Fall Risk and appropriate interventions in the flowsheet. Outcome: Progressing Towards Goal 
Note:  
Fall Risk Interventions: 
Mobility Interventions: Communicate number of staff needed for ambulation/transfer, OT consult for ADLs, Patient to call before getting OOB, PT Consult for mobility concerns Medication Interventions: Assess postural VS orthostatic hypotension, Evaluate medications/consider consulting pharmacy, Patient to call before getting OOB, Teach patient to arise slowly Elimination Interventions: Call light in reach, Patient to call for help with toileting needs, Stay With Me (per policy), Toileting schedule/hourly rounds History of Falls Interventions: Consult care management for discharge planning, Evaluate medications/consider consulting pharmacy, Investigate reason for fall, Room close to nurse's station Problem: Breathing Pattern - Ineffective Goal: *Absence of hypoxia Outcome: Progressing Towards Goal 
Goal: *Use of effective breathing techniques Outcome: Progressing Towards Goal 
Goal: *PALLIATIVE CARE:  Alleviation of Dyspnea Outcome: Progressing Towards Goal

## 2019-06-09 NOTE — PROGRESS NOTES
Hospitalist Progress Note 2019 Admit Date: 2019  2:50 PM  
NAME: Raquel Reynaga :  1950 MRN:  [id number] Attending: Maverick Mcdowell DO 
PCP:  Suad England MD 
 
SUBJECTIVE:  
67 yo female with PMH significant for myelodisplastic syndrome, DM2, CKD stage 4, HTN, sHF, HLD, COPD, AVR on chronic coumadin. Pt presented to the ED via EMS with report of fatigue. Pt also states that she has missed her recent iron injections. Pt initially denied any falls but did recall a fall which injured her right arm/shoulder a few days prior. On admit it was found that pt hgb 7.1, K+ also 7.1, Cr 3.71, . Stool was FOB positive. CXR negative, EKG LBBB, axis deviation to the left. UA unremarkable. Pt admitted to hospitalist for symptomatic anemia, Ac on CKD, hyperkalemia and ? GIB 
GI, Cardiology, Hematology and Nephrology following.  - \"Hi honey. I feel better today\". In good spirits. Sitting in bedside chair. Review of Systems negative with exception of pertinent positives noted above PHYSICAL EXAM  
 
Visit Vitals /64 (BP 1 Location: Left arm, BP Patient Position: At rest) Pulse 66 Temp 98.1 °F (36.7 °C) Resp 16 Ht 5' 2\" (1.575 m) Wt 99.2 kg (218 lb 12.8 oz) SpO2 96% BMI 40.02 kg/m² Temp (24hrs), Av.1 °F (36.7 °C), Min:97.5 °F (36.4 °C), Max:98.4 °F (36.9 °C) Oxygen Therapy O2 Sat (%): 96 % (19 1353) Pulse via Oximetry: 72 beats per minute (19 1353) O2 Device: Nasal cannula (19 1353) O2 Flow Rate (L/min): 3 l/min (19 1353) FIO2 (%): 32 % (19 0333) Intake/Output Summary (Last 24 hours) at 2019 1515 Last data filed at 2019 1441 Gross per 24 hour Intake 795 ml Output 600 ml Net 195 ml General: No acute distress  obese Lungs:  Diminished w/o wheezing or rhonchi Heart:  Regular rate and rhythm,systolic  click present, 
 Abdomen: Soft, Non distended, Non tender, Positive bowel sounds Extremities: No cyanosis, clubbing or edema Neurologic:  No focal deficits ASSESSMENT Active Hospital Problems Diagnosis Date Noted  Symptomatic anemia 06/06/2019  Hyperkalemia 06/06/2019  Right shoulder pain 06/06/2019  Systolic CHF, acute on chronic ([facility] 12/31/2018  Type 2 diabetes mellitus with nephropathy ([facility] 12/18/2017  S/P AVR (aortic valve replacement) Mechanical/Artific  HLD (hyperlipidemia)  COPD (chronic obstructive pulmonary disease) ([facility] 04/02/2014 HOME OXYGEN 3 LITERS 
  
 CKD (chronic kidney disease) stage 4, GFR 15-29 ml/min ([facility]) 07/10/2013  Essential hypertension, benign 01/20/2013  MDS (myelodysplastic syndrome) ([facility] 12/17/2011 Procrit started in August, 2011 12/18/11 Procrit weekly and Iron stores. 5-12 12-13-12 good response to 3 weekly procrit did not need it last time 3-7-13 Pt doing well. Just wanted a \"check-up. \" Responding to Procrit every three weeks. 8-29-13 patient has missed some injections on recently restarted hemoglobin only issue , takes oral iron iron stores each time A/p - Symptomatic anemia - likely multifactorial from hx of MDS and now GIB. Heme/ GI following. 1 unit prbc 6/6 for hgb 7.1. Procrit given 6/7. Start iron. - GI bleed - occ pos in ER. Report of red blood on tissue. GI following. Cont IV protonix. Continue serial h/h. Plan for egd/colo per GI once INR trended down <1.5 with hep window - likely Monday or tuesday - MDS - heme/onc has seen. Ordered procrit and iron as above 
 
- mechanical AVR - holding coumadin, cont heparin window 
 
- acute on CKD/ hyperkalemia - stage 4. Nephrology following. Renal US wnl. Holding lasix, aldactone and entresto. Cr down-trending. K still high. Dosed valtessa 6/9 
 
- hypernatremia - corrected, monitor off ivf - systolic CHF - as above. Weight up and cardiology has dosed lasix iv x 1 today. Monitor I/o and renal function. Stop ivf. May need to resume daily diuretic again soon. - right shoulder pain - s/p fall. xrays neg for fx.  
 
- COPD - stable, supplemental ox prn. Cont home nebs DVT Prophylaxis: hep gtt bridge Signed By: Lewis Mcallister DO   
 June 9, 2019

## 2019-06-10 NOTE — PROGRESS NOTES
Problem: Mobility Impaired (Adult and Pediatric) Goal: *Acute Goals and Plan of Care (Insert Text) Description LTG: 
(1.)Ms. Nato Hidalgo will move from supine to sit and sit to supine  in bed with MODIFIED INDEPENDENCE within 5 treatment day(s). (2.)Ms. Nato Hidalgo will transfer from bed to chair and chair to bed with MODIFIED INDEPENDENCE using the least restrictive device within 5 treatment day(s). (3.)Ms. Nato Hidalgo will ambulate with MODIFIED INDEPENDENCE for 50 feet with the least restrictive device within 5 treatment day(s). ________________________________________________________________________________________________ Outcome: Progressing Towards Goal 
 
 
PHYSICAL THERAPY: Daily Note and PM 6/10/2019 INPATIENT: PT Visit Days : 1 [place] / [place] / Product Type: Medicare /   
  
NAME/AGE/GENDER: Ze Hanley is a 68 y.o. female PRIMARY DIAGNOSIS: Symptomatic anemia [D64.9] Symptomatic anemia Symptomatic anemia Procedure(s) (LRB): ESOPHAGOGASTRODUODENOSCOPY (EGD) (N/A) COLONOSCOPY/ BMI 40 ROOM 637 (N/A) ICD-10: Treatment Diagnosis:  
 · Generalized Muscle Weakness (M62.81) · Difficulty in walking, Not elsewhere classified (R26.2) · Repeated Falls (R29.6) Precaution/Allergies: 
Ferrlecit [sodium ferric gluconat-sucrose]; Sulfa (sulfonamide antibiotics); and Aspirin ASSESSMENT:  
 
Ms. Nato Hidalgo presents sitting in bedside chair and is agreeable to PT. Patient reports 3 falls in the last 1-2 months. She states she lives independently, is on home O2, has a rollator but doesn't use it. 6/10/19:  Pt supine in bed on arrival. She is agreeable to PT and plans to go to rehab at discharge. Bed mobility with supervision. She requested to use the restroom, but was unable to make it before she began to urinate.  She stood to have brief changed and walked 25' without AD and CGA on O2 during treatment. Pt returned to supine and left with needs in reach. This section established at most recent assessment PROBLEM LIST (Impairments causing functional limitations): 1. Decreased Strength 2. Decreased ADL/Functional Activities 3. Decreased Transfer Abilities 4. Decreased Ambulation Ability/Technique 5. Decreased Balance 6. Increased Pain 7. Decreased Activity Tolerance 8. Decreased [place] with Home Exercise Program 
 INTERVENTIONS PLANNED: (Benefits and precautions of physical therapy have been discussed with the patient.) 1. Balance Exercise 2. Family Education 3. Gait Training 4. Group Therapy 5. Home Exercise Program (HEP) 6. Therapeutic Activites 7. Therapeutic Exercise/Strengthening 8. Transfer Training TREATMENT PLAN: Frequency/Duration: 3 times a week for duration of hospital stay Rehabilitation Potential For Stated Goals: Excellent REHAB RECOMMENDATIONS (at time of discharge pending progress):   
Placement: It is my opinion, based on this patient's performance to date, that Ms. Diaz may benefit from participating in 1-2 additional therapy sessions in order to continue to assess for rehab potential and then make recommendation for disposition at discharge. Equipment: ? To be determined after AD trial   
    
 
 
 
HISTORY:  
History of Present Injury/Illness (Reason for Referral): 
Pt is a 67 yo female with PMH significant for myelodisplastic syndrome, DM2, CKD stage 4, HTN, sHF, HLD, COPD, AVR on chronic coumadin. Pt presented to the ED via EMS with report of fatigue. Pt reports being too weak to get up this am.  Pt also states that she has missed her recent iron injections. Pt initially denied any falls but did recall a fall which injured her right arm/shoulder. She denies fever, CP, sob. She denies any n/v/d/c. Pt denies seeing any blood in her stool. On admit it was found that pt hgb 7.1, K+ also 7.1, Cr 3.71, . Stool was FOB positive. CXR negative, EKG LBBB, axis deviation to the left. Bakari Johnsonob ordered not yet obtained. Pt getting calcium gluconate, D50 and insulin - will then recheck potassium. 10 systems reviewed and negative except as noted in HPI. Past Medical History/Comorbidities: Ms. Diaz  has a past medical history of Anticoagulated on Coumadin (2013), CAD (coronary artery disease) (2013), Cardiomyopathy ([facility] [address] CHF (congestive heart failure) ([facility] [address] (2017), CKD (chronic kidney disease) stage 3, GFR 30-59 ml/min ([facility]) (7/10/2013), COPD (chronic obstructive pulmonary disease) ([facility] [address] (2014), Diabetes ([facility] [address] Essential hypertension, benign (2013), HLD (hyperlipidemia), ICD (implantable cardioverter-defibrillator) in place (10/2/2014), Iron deficiency anemia due to chronic blood loss (2009), MDS (myelodysplastic syndrome) ([facility] [address] (2011), REJI (obstructive sleep apnea)-cpap (2014), Osteoarthritis, Osteopenia, Pulmonary hypertension ([facility] [address] (6/15/2016), Quadrantanopia, Skin defect (2018), and Visual complaint (2015). She also has no past medical history of Difficult intubation, Malignant hyperthermia due to anesthesia, Nausea & vomiting, Pseudocholinesterase deficiency, or Unspecified adverse effect of anesthesia. Ms. Diaz  has a past surgical history that includes cardiac catheterization (); ir bx bone marrow diagnostic (2011); hx aortic valve replacement (, ); hx  section; hx tubal ligation; hx heart catheterization (); hx coronary stent placement (May, 2014); hx pacemaker (10/2/2014); and hx endoscopy (2009). Social History/Living Environment:  
Home Environment: Apartment # Steps to Enter: 2 One/Two Story Residence: One story Living Alone: Yes Support Systems: Child(alysha) Patient Expects to be Discharged to[de-identified] [facility] Current DME Used/Available at Home: Oxygen, portable, Shower chair, Walker, rollator Tub or Shower Type: Tub/Shower combination Prior Level of Function/Work/Activity: 
Modified independent to independent at baseline. Doesn't drive. Has a rollator. Number of Personal Factors/Comorbidities that affect the Plan of Care: 1-2: MODERATE COMPLEXITY EXAMINATION:  
Most Recent Physical Functioning:  
Gross Assessment: 
  
         
  
Posture: 
  
Balance: 
  Bed Mobility: 
Supine to Sit: Supervision Sit to Supine: Supervision Wheelchair Mobility: 
  
Transfers: 
Sit to Stand: Contact guard assistance Stand to Sit: Contact guard assistance Bed to Chair: Contact guard assistance Gait: 
  
Base of Support: Widened Speed/Winifred: Shuffled; Slow Gait Abnormalities: Decreased step clearance Distance (ft): 25 Feet (ft) Ambulation - Level of Assistance: Stand-by assistance;Contact guard assistance Interventions: Safety awareness training;Verbal cues Body Structures Involved: 1. Bones 2. Joints 3. Muscles 4. Ligaments Body Functions Affected: 1. Neuromusculoskeletal 
2. Movement Related Activities and Participation Affected: 1. Mobility 2. Self Care 3. Domestic Life 4. Interpersonal Interactions and Relationships Number of elements that affect the Plan of Care: 4+: HIGH COMPLEXITY CLINICAL PRESENTATION:  
Presentation: Stable and uncomplicated: LOW COMPLEXITY CLINICAL DECISION MAKIN [address] 6 Clicks Basic Mobility Inpatient Short Form How much difficulty does the patient currently have. .. Unable A Lot A Little None 1. Turning over in bed (including adjusting bedclothes, sheets and blankets)? ? 1   ? 2   ? 3   ? 4  
2. Sitting down on and standing up from a chair with arms ( e.g., wheelchair, bedside commode, etc.)   ? 1   ? 2   ? 3   ? 4  
3. Moving from lying on back to sitting on the side of the bed?   ? 1   ? 2   ? 3   ? 4 How much help from another person does the patient currently need. .. Total A Lot A Little None 4. Moving to and from a bed to a chair (including a wheelchair)? ? 1   ? 2   ? 3   ? 4  
5. Need to walk in hospital room? ? 1   ? 2   ? 3   ? 4  
6. Climbing 3-5 steps with a railing? ? 1   ? 2   ? 3   ? 4  
© 2007, Trustees of [address] [place] [address] [address] [address], under license to [employer]. All rights reserved Score:  Initial: 18 Most Recent: X (Date: -- ) Interpretation of Tool:  Represents activities that are increasingly more difficult (i.e. Bed mobility, Transfers, Gait). Medical Necessity:    
· Patient demonstrates excellent ·  rehab potential due to higher previous functional level. Reason for Services/Other Comments: 
· Patient continues to require modification of therapeutic interventions to increase complexity of exercises · . Use of outcome tool(s) and clinical judgement create a POC that gives a: Clear prediction of patient's progress: LOW COMPLEXITY  
  
 
 
 
TREATMENT:  
(In addition to Assessment/Re-Assessment sessions the following treatments were rendered) Pre-treatment Symptoms/Complaints:  No complaints of pain 
Pain: Initial:  
   Post Session:  0/10 Therapeutic Activity: (    25 minutes): Therapeutic activities including Bed transfers, Chair transfers and Ambulation on level ground to improve mobility, strength and balance. Required minimal Safety awareness training;Verbal cues to promote dynamic balance in standing. Braces/Orthotics/Lines/Etc:  
· IV 
· O2 Device: Nasal cannula Treatment/Session Assessment:   
· Response to Treatment:  Pt motivated and doing much better with mobility. · Interdisciplinary Collaboration:  
o Registered Nurse · After treatment position/precautions:  
o Supine in bed 
o Bed/Chair-wheels locked 
o Bed in low position 
o Call light within reach · Compliance with Program/Exercises: Will assess as treatment progresses · Recommendations/Intent for next treatment session:   \"Next visit will focus on advancements to more challenging activities and reduction in assistance provided\". Total Treatment Duration: PT Patient Time In/Time Out Time In: 7772 Time Out: 1540 Satinder Rahman, CRYS

## 2019-06-10 NOTE — PROGRESS NOTES
1030:  .2. Holding heparin gtt for one hour. 1131:  Heparin gtt restarted at 5 units/kg/hr Will order next PTT for 1512

## 2019-06-10 NOTE — PROGRESS NOTES
[facility] Progress Note Admit Date:  6/6/2019 Today's Date:  6/10/2019 CC:  GI bleeding Subjective:  
 
Patient No further bleeding since admission. Denies any abdominal pain, N&V. Has intermittent dysphagia to mostly liquids at the suprasternal notch. Medications:  
Current Facility-Administered Medications Medication Dose Route Frequency  polyethylene glycol (MIRALAX) packet 17 g  17 g Oral BID  patiromer calcium sorbitex (VELTASSA) powder 8.4 g  8.4 g Oral DAILY  ferrous gluconate 324 mg (38 mg iron) tablet 1 Tab  1 Tab Oral BID WITH MEALS  
 heparin 25,000 units in dextrose 500 mL infusion  12-25 Units/kg/hr IntraVENous TITRATE  pantoprazole (PROTONIX) 40 mg in sodium chloride 0.9% 10 mL injection  40 mg IntraVENous Q12H  
 albuterol (PROVENTIL VENTOLIN) nebulizer solution 2.5 mg  2.5 mg Nebulization Q4H PRN  
 carvedilol (COREG) tablet 6.25 mg  6.25 mg Oral BID WITH MEALS  sertraline (ZOLOFT) tablet 50 mg  50 mg Oral DAILY  simvastatin (ZOCOR) tablet 20 mg  20 mg Oral QHS  traZODone (DESYREL) tablet 50 mg  50 mg Oral QHS  vitamin E (AQUA GEMS) capsule 400 Units (Patient Supplied)  400 Units Oral DAILY  sodium chloride (NS) flush 5-40 mL  5-40 mL IntraVENous Q8H  
 sodium chloride (NS) flush 5-40 mL  5-40 mL IntraVENous PRN  
 acetaminophen (TYLENOL) tablet 650 mg  650 mg Oral Q4H PRN  
 HYDROcodone-acetaminophen (NORCO) 5-325 mg per tablet 1 Tab  1 Tab Oral Q4H PRN  
 naloxone (NARCAN) injection 0.4 mg  0.4 mg IntraVENous PRN  
 ondansetron (ZOFRAN) injection 4 mg  4 mg IntraVENous Q4H PRN  
 bisacodyl (DULCOLAX) tablet 10 mg  10 mg Oral DAILY PRN  
 0.9% sodium chloride infusion 250 mL  250 mL IntraVENous PRN  
 budesonide (PULMICORT) 500 mcg/2 ml nebulizer suspension  500 mcg Nebulization BID RT  And  
 albuterol (PROVENTIL VENTOLIN) nebulizer solution 2.5 mg  2.5 mg Nebulization Q6HWA RT  
  diphenhydrAMINE (BENADRYL) capsule 25 mg  25 mg Oral Q4H PRN Review of Systems: ROS was obtained, with pertinent positives as listed above. No chest pain or SOB. Diet:  GI soft Objective:  
Vitals: 
Visit Vitals /57 Pulse 76 Temp 98.4 °F (36.9 °C) Resp 18 Ht 5' 2\" (1.575 m) Wt 97.5 kg (215 lb) SpO2 100% BMI 39.32 kg/m² Intake/Output: 
No intake/output data recorded. 06/08 1901 - 06/10 0700 In: 297 [P.O.:270; I.V.:675] Out: 0358 [[facility]:9890] Exam: 
General appearance: alert, cooperative, no distress SITTING UP IN CHAIR; O2 PER NC 
Lungs: clear to auscultation bilaterally anteriorly Heart: regular rate and rhythm Abdomen: soft, non-tender. Bowel sounds normal. No masses, no organomegaly Extremities: extremities normal, atraumatic, no cyanosis or edema Neuro:  alert and oriented Data Review (Labs):   
Recent Labs  
  06/10/19 
0202 06/09/19 
1900 06/09/19 
1257 06/09/19 
0205 06/08/19 
2004 06/08/19 
1055 06/08/19 
0515 06/07/19 
2112 06/07/19 
1621 06/07/19 
0932 WBC 7.1  --   --  6.5  --   --  5.6  --   --   --   
HGB 7.3* 7.9* 8.1* 7.2* 7.7*  --  7.9* 7.8* 8.4* 8.5* HCT 23.7* 26.2* 26.6* 23.3* 24.9*  --  25.6* 25.2* 28.1* 27.2*  
*  --   --  100*  --   --  125*  --   --   -- MCV 91.5  --   --  92.5  --   --  91.1  --   --   --   
  --   --  143  --   --  146*  --   --   --   
K 5.7*  --   --  5.6*  --   --  5.4*  --   --   --   
*  --   --  110*  --   --  114*  --   --   --   
CO2 30  --   --  28  --   --  26  --   --   --   
BUN 65*  --   --  74*  --   --  89*  --   --   --   
CREA 2.28*  --   --  2.46*  --   --  2.67*  --   --   --   
CA 9.5  --   --  9.2  --   --  9.7  --   --   --   
*  --   --  130*  --   --  90  --   --   --   
PTP 19.8*  --   --  23.3*  --  24.1*  --   --   --   --   
INR 1.7  --   --  2.1  --  2.2  --   --   --   --   
APTT 55.2* 147.1* 60.0* 164.9* >200.0*  --   --   --   --   --   
 
 
Assessment: Principal Problem: 
  Symptomatic anemia (6/6/2019) Active Problems: 
  MDS (myelodysplastic syndrome) ([facility] [address] (12/17/2011) Overview: Procrit started in August, 2011 12/18/11 Procrit weekly and Iron stores. 5-12 12-13-12 good response to 3 weekly procrit did not need it last time 3-7-13 Pt doing well. Just wanted a \"check-up. \" Responding to Procrit  
    every three weeks. 8-29-13 patient has missed some injections on recently restarted  
    hemoglobin only issue , takes oral iron iron stores each time Essential hypertension, benign (1/20/2013) CKD (chronic kidney disease) stage 4, GFR 15-29 ml/min ([facility]) (7/10/2013) COPD (chronic obstructive pulmonary disease) ([address] (4/2/2014) Overview: HOME OXYGEN 3 LITERS 
 
  HLD (hyperlipidemia) () 
 
  S/P AVR (aortic valve replacement) () Overview: Mechanical/Artific Type 2 diabetes mellitus with nephropathy ([address] (12/18/2017) Systolic CHF, acute on chronic ([address] (12/31/2018) Hyperkalemia (6/6/2019) Right shoulder pain (6/6/2019) 68 y.o. female with PMH of CKD stage IV, COPD on trilogy at night and 3L O2 durign day, myelodysplastic syndrome w anemia, htn, hld, and CAD w cardiac arrest 5-2014 w LHC and PCI to LAD, cardiomyopathy s/p [facility] AVR (on coumadin) w echo  w EF 25% w severe diffuse hypokinesis (worse apical and septal) w [facility] AVR and mild MR- s/p [employer] ICD, who we are following for GIB. Noted to have HO + stools and smudge of red blood with bowel movement, but none since admission. Current Hgb 7.3 6/10 s/p transfusion.  INR 6/10 now 1.7. On Heparin IV. Anemia is likely multifactorial considering coumadin use, CKD with increased creatinine, and myelodysplastic syndrome. Hemoccult positive stools in presence of INR >4 on admission. Plan Colonoscopy/EGD tomorrow 6/11 through a Heparin window. Plan: 1.Schedule diagnostic colonoscopy with Dr. Marianne Briceño tomorrow 6/11/19. Risks and beneifts discussed with patient to include risk of infection, bleeding, perforation, anesthesia, and possilb mortality. She verbalized understanding and wishes to proceed with procedures. 2.Scheduled EGD with possible dilation for dysphagia tomorrow 6/11/19 with Dr. Marianne Briceño 
3. Clear liquid diet nothing red today 4. NPO after midnight 5. MIralax bowel cleanse tonight 6. Dulcolax today 7. Hold heparin drip 6 hours prior to procedure. Chuyita Thomas. Jessica Pagan in collaboration with Dr. Marianne Briceño 
[facility]

## 2019-06-10 NOTE — PROGRESS NOTES
06/10/19 0123 Oxygen Therapy O2 Sat (%) 96 % Pulse via Oximetry 70 beats per minute O2 Device BIPAP  
O2 Flow Rate (L/min) 3 l/min FIO2 (%) 32 % Place pt on home trilogy. Pt resting well. No complication noted.

## 2019-06-10 NOTE — PROGRESS NOTES
[facility] PROGRESS NOTE Follow up for: 
 
Subjective:  
Patient seen and examined. Feeling okay. Renal function is improving. K remains mildly elevated ROS: 
Gen - no fever, no chills, appetite okay CV - no chest pain, no orthopnea Lung - no shortness of breath, no cough Abd - no tenderness, no nausea, no vomiting Ext - no edema Objective:  
Exam: 
Vitals:  
 06/10/19 3197 06/10/19 0542 06/10/19 8033 06/10/19 5358 BP: 115/68   107/57 Pulse: 70   76 Resp: 20   18 Temp: 97.9 °F (36.6 °C)   98.4 °F (36.9 °C) SpO2: 96%  93% 100% Weight:  97.5 kg (215 lb) Height:      
 
 
 
Intake/Output Summary (Last 24 hours) at 6/10/2019 1239 Last data filed at 6/10/2019 0294 Gross per 24 hour Intake 150 ml Output 1050 ml Net -900 ml Current Facility-Administered Medications Medication Dose Route Frequency  polyethylene glycol (MIRALAX) powder 255 g  255 g Oral ONCE  
 bisacodyl (DULCOLAX) tablet 10 mg  10 mg Oral ONCE  
 0.9% sodium chloride infusion 250 mL  250 mL IntraVENous PRN  polyethylene glycol (MIRALAX) packet 17 g  17 g Oral BID  patiromer calcium sorbitex (VELTASSA) powder 8.4 g  8.4 g Oral DAILY  ferrous gluconate 324 mg (38 mg iron) tablet 1 Tab  1 Tab Oral BID WITH MEALS  
 heparin 25,000 units in dextrose 500 mL infusion  12-25 Units/kg/hr IntraVENous TITRATE  pantoprazole (PROTONIX) 40 mg in sodium chloride 0.9% 10 mL injection  40 mg IntraVENous Q12H  
 albuterol (PROVENTIL VENTOLIN) nebulizer solution 2.5 mg  2.5 mg Nebulization Q4H PRN  
 carvedilol (COREG) tablet 6.25 mg  6.25 mg Oral BID WITH MEALS  sertraline (ZOLOFT) tablet 50 mg  50 mg Oral DAILY  simvastatin (ZOCOR) tablet 20 mg  20 mg Oral QHS  traZODone (DESYREL) tablet 50 mg  50 mg Oral QHS  vitamin E (AQUA GEMS) capsule 400 Units (Patient Supplied)  400 Units Oral DAILY  sodium chloride (NS) flush 5-40 mL  5-40 mL IntraVENous Q8H  
  sodium chloride (NS) flush 5-40 mL  5-40 mL IntraVENous PRN  
 acetaminophen (TYLENOL) tablet 650 mg  650 mg Oral Q4H PRN  
 HYDROcodone-acetaminophen (NORCO) 5-325 mg per tablet 1 Tab  1 Tab Oral Q4H PRN  
 naloxone (NARCAN) injection 0.4 mg  0.4 mg IntraVENous PRN  
 ondansetron (ZOFRAN) injection 4 mg  4 mg IntraVENous Q4H PRN  
 bisacodyl (DULCOLAX) tablet 10 mg  10 mg Oral DAILY PRN  
 0.9% sodium chloride infusion 250 mL  250 mL IntraVENous PRN  
 budesonide (PULMICORT) 500 mcg/2 ml nebulizer suspension  500 mcg Nebulization BID RT And  
 albuterol (PROVENTIL VENTOLIN) nebulizer solution 2.5 mg  2.5 mg Nebulization Q6HWA RT  
 diphenhydrAMINE (BENADRYL) capsule 25 mg  25 mg Oral Q4H PRN  
 
 
EXAM 
GEN - Alert and awake. CV - S1, S2, RRR, no rub, murmur, or gallop Lung - clear to auscultation bilaterally Abd - soft, nontender, BS present Ext - trace edema Recent Labs  
  06/10/19 
0202 06/09/19 
1900 06/09/19 
1257 06/09/19 
0205  06/08/19 
0515 WBC 7.1  --   --  6.5  --  5.6 HGB 7.3* 7.9* 8.1* 7.2*   < > 7.9*  
HCT 23.7* 26.2* 26.6* 23.3*   < > 25.6*  
*  --   --  100*  --  125*  
 < > = values in this interval not displayed. Recent Labs  
  06/10/19 
0202 06/09/19 
0205 06/08/19 
0515  143 146*  
K 5.7* 5.6* 5.4*  
* 110* 114* CO2 30 28 26 BUN 65* 74* 89* CREA 2.28* 2.46* 2.67* CA 9.5 9.2 9.7 * 130* 90 Assessment and Plan: FELIZ on CKD-  Cr is improving. Continue to monitor renal function and UOP. Hyperkalemia- K remains elevated. On [place]. Low k diet. Monitoring. Anemia- Hb is downtrending again. GI planning a scope tomorrow.  
 
 
Melany Espinoza MD

## 2019-06-10 NOTE — PROGRESS NOTES
Problem: Falls - Risk of 
Goal: *Absence of Falls Description Document Tia Manus Fall Risk and appropriate interventions in the flowsheet. Outcome: Progressing Towards Goal 
  
Problem: Breathing Pattern - Ineffective Goal: *Absence of hypoxia Outcome: Progressing Towards Goal 
Goal: *Use of effective breathing techniques Outcome: Progressing Towards Goal 
Goal: *PALLIATIVE CARE:  Alleviation of Dyspnea Outcome: Progressing Towards Goal

## 2019-06-10 NOTE — PROGRESS NOTES
Cm met with pt to discuss DC plans. Pt's dx Rhea Gibbsnt also present-[phone]. Pt would prefer to go home but agreeable to STR. Per pt and dx request, referral sent to 9th floor, as pt has been there twice in the past.  CM will follow.

## 2019-06-10 NOTE — PROGRESS NOTES
PTT 36.2. Per protocol gave bolus dose of 35 units/kg and increased heparin gtt by 4 units/kg/to hour. Next PTT at 2200.

## 2019-06-10 NOTE — CONSULTS
PM&R Rehab Consult Subjective:  
 
Date of Consultation:  Alaina 10, 2019 Referring Physician: Dr Sarthak Palomino Patient is a 68 y.o. female who is being seen for rehab recommendations due to profound acute on chronic physical debility Principal Problem: 
  Symptomatic anemia (6/6/2019) Active Problems: 
  MDS (myelodysplastic syndrome) ([facility] [address] (12/17/2011) Overview: Procrit started in August, 2011 12/18/11 Procrit weekly and Iron stores. 5-12 12-13-12 good response to 3 weekly procrit did not need it last time 3-7-13 Pt doing well. Just wanted a \"check-up. \" Responding to Procrit  
    every three weeks. 8-29-13 patient has missed some injections on recently restarted  
    hemoglobin only issue , takes oral iron iron stores each time Essential hypertension, benign (1/20/2013) CKD (chronic kidney disease) stage 4, GFR 15-29 ml/min ([facility]) (7/10/2013) COPD (chronic obstructive pulmonary disease) ([facility] [address] (4/2/2014) Overview: HOME OXYGEN 3 LITERS 
 
  HLD (hyperlipidemia) () 
 
  S/P AVR (aortic valve replacement) () Overview: Mechanical/Artific Type 2 diabetes mellitus with nephropathy ([facility] [address] (12/18/2017) Systolic CHF, acute on chronic ([address] (12/31/2018) Hyperkalemia (6/6/2019) Right shoulder pain (6/6/2019) HPI; Ms Siobhan Hodges is well known to me from previous [facility] hospitalization who has a PMH of CAD, O2 dep Copd, CKD stg 3, Aortic valve replacement on coumadin, Cardiomyopathy/CHF/ICD, MDS, DM, HTN, iron deficiency anemia as well as OA who presented to [facility] on 6/6 via EMS secondary to profound fatigue. She admits to me, frequent falls from making \"bad choices\". She was found to have a hgb of 7.1 and admitted to missing her recent iron injections. Her Creat was 3.71, , stool was heme positive, K was elevated at 7.1. EKG LBBB, axis deviation to the left.  Pete Baltazar ordered not yet obtained. Pt getting calcium gluconate, D50 and insulin for hyperkalemia. She was given one unit of PRBC on admission. Cardiology was consulted. Her INR was 3.6 on presentation. Her hgb improved from 7.1 to 8.7. Cardiology said to hold coumadin and allow INR to come down and bridge with heparin when safe from a GI standpoint. Also recommended gentle hydration, monitor volume status closely. Continue BB, no ACE/ARB.   
  
She has been receiving q 3 weeks procrit injections but missed her May procrit. She also is on oral iron for CHRIS. She was seen in consultation by Heme/onc, Dr Brian Saul. Her primary hematologist is Dr Julia Ellis. She has a presumed diagnosis of MDS based on clinical symptoms. Bone marrow morphology failed to be definitive although could not be ruled out. He felt that her Anemia likely multifactorial including presumed MDS, iron deficiency related, as well as AOCD/CKD related. She received procrit and IV iron in house. Additional anemia evaluation reveals:  iron 78, folate 36, B12 963, TIBC 239, saturation 33%, jhon 81. She was seen by Dr Silvano Craven of nephrology who recommended discontinuing her Aldactone and Entresto due to the hyperkalemia. A repeat Renal US was performed as well as urine chemistries and serial labs. In hope that, as they tried to improve her cardiac function that her renal function will return back to baseline with a creatinine of around 2. Currently 6/10 her creat is 2.28. K remains elevated at 5.7 Dr Palmer Campos of GI, recommended no EGD or colonoscopy at this time and noted agreement with renal evaluation and prn transfusions. However, since that consultation on 6/7, it was decided to proceed 6/11 with EGD and colonoscopy. Her hgb is back down to 7.1 6/10. She is on trilogy at [facility] due to chronic resp failure. She is on 3LO2 at all times. Functionally, she really improved today and was supervision with bed mobility, CGA STS and ambulated 25ft with CGA .   However, with ADLs she is mod assist with grooming, otherwise max assist. She has been non compliant with use of Rollator. She has been falling down the steps and continues to do so bc its the only way out of her apt. Past Medical History:  
Diagnosis Date  Anticoagulated on Coumadin 7/9/2013 S/P AVR  CAD (coronary artery disease) 1/20/2013 5/8/14 PCI LAD with stent placed  Cardiomyopathy ([address]  CHF (congestive heart failure) ([address] 2/17/2017  CKD (chronic kidney disease) stage 3, GFR 30-59 ml/min ([facility]) 7/10/2013  COPD (chronic obstructive pulmonary disease) ([address] 4/2/2014  Diabetes ([address]  Essential hypertension, benign 1/20/2013  HLD (hyperlipidemia)  ICD (implantable cardioverter-defibrillator) in place 10/2/2014 Bi[employer]ron[employer] single-chamber ICD implantation 10/20/14  Iron deficiency anemia due to chronic blood loss 7/29/2009  MDS (myelodysplastic syndrome) ([address] 12/17/2011 Procrit started in August, 2011 12/18/11 Procrit weekly and Iron stores. 5-12 12-13-12 good response to 3 weekly procrit did not need it last time  3-7-13 Pt doing well. Just wanted a \"check-up. \" Responding to Procrit every three weeks. 8-29-13 patient has missed some injections on recently restarted hemoglobin only issue , takes oral iron iron stores each time  REJI (obstructive sleep apnea)-cpap 4/2/2014  Osteoarthritis  Osteopenia  Pulmonary hypertension ([address] 6/15/2016  Quadrantanopia  Skin defect 11/1/2018  Visual complaint 12/14/2015 Family History Problem Relation Age of Onset  Heart Disease Mother CHF  Hypertension Mother  Kidney Disease Mother  Heart Disease Father CHF  Lung Disease Father  Diabetes Father  Cancer Father   
     prostate  Hypertension Father  Heart Attack Father  Heart Disease Maternal Aunt  Diabetes Brother  Coronary Artery Disease Other  Breast Cancer Neg Hx Social History Tobacco Use  Smoking status: Former Smoker Packs/day: 0.25 Years: 42.00 Pack years: 10.50 Types: Cigarettes Start date: 10/1/1956 Last attempt to quit: 2014 Years since quittin.1  Smokeless tobacco: Never Used Substance Use Topics  Alcohol use: No  
  Alcohol/week: 0.0 oz Home Environment: Apartment # Steps to Enter: 3 One/Two Story Residence: One story Living Alone: Yes Support Systems: Child(alysha) Patient Expects to be Discharged to[de-identified] RTOSTIZ Current DME Used/Available at Home: Oxygen, portable, Walker, rolling, Cane, straight Prior Level of Function/Work/Activity: 
Modified independent to independent at baseline. Doesn't drive. Has a rollator. Here dtg checks in 
Past Surgical History:  
Procedure Laterality Date [address]    HX AORTIC VALVE REPLACEMENT  2005  
 mechanical valve   HX  SECTION    
 HX CORONARY STENT PLACEMENT  May, 2014 STEMI with Stent placement.  HX ENDOSCOPY  2009 EGD [address] CATHETERIZATION    HX PACEMAKER  10/2/2014 [employer] ICD  HX TUBAL LIGATION    
 IR BX BONE MARROW DIAGNOSTIC  2011 Prior to Admission medications Medication Sig Start Date End Date Taking? Authorizing Provider  
furosemide (LASIX) 40 mg tablet TAKE 1 TABLET BY MOUTH EVERY DAY 19   Felisha Esparza MD  
calcium carbonate (CALCIUM 600 PO) Take 1 Tab by mouth daily. Provider, Historical  
vitamin E (AQUA GEMS) 400 unit capsule Take 400 Units by mouth daily. Provider, Historical  
traZODone (DESYREL) 50 mg tablet TAKE 1/2-1 TABS BY MOUTH NIGHTLY. 3/19/19   Felisha Esparza MD  
carvedilol (COREG) 6.25 mg tablet Take 1 Tab by mouth two (2) times daily (with meals). 19   Casper GOULD MD  
traMADol Denise Lily) 50 mg tablet Take 1 Tab by mouth every six (6) hours as needed for Pain.  Max Daily Amount: 200 mg. 18   James Hernandez MD  
 cholecalciferol, vitamin D3, (VITAMIN D3) 2,000 unit tab Take 2,000 Units by mouth daily. Provider, Historical  
warfarin (COUMADIN) 5 mg tablet Take 5 mg by mouth every Monday and Friday. 5 mg every Mon & Fri and then 2.5 mg all other days Provider, Historical  
spironolactone (ALDACTONE) 25 mg tablet Take 25 mg by mouth daily. Provider, Historical  
nitroglycerin (NITROSTAT) 0.4 mg SL tablet 1 Tab by SubLINGual route every five (5) minutes as needed for Chest Pain. 12/18/18   Solomon Claudio MD  
sertraline (ZOLOFT) 50 mg tablet Take 1 Tab by mouth daily. 12/18/18   Solomon Claudio MD  
simvastatin (ZOCOR) 20 mg tablet Take 1 Tab by mouth nightly. 12/12/18   Solomon Claudio MD  
sacubitril-valsartan (ENTRESTO) 24 mg/26 mg tablet Take 1 Tab by mouth two (2) times a day. 11/26/18   Rickey Real MD  
fluticasone-vilanterol (BREO ELLIPTA) 537-05 mcg/dose inhaler Take 1 Puff by inhalation daily. 11/1/18   Jennifer Raza NP  
OTHER Trilogy    Provider, Historical  
mometasone-formoterol (DULERA) 200-5 mcg/actuation HFA inhaler 2 puffs bid, rinse mouth after use. Patient taking differently: Take 2 Puffs by inhalation two (2) times daily as needed for Cough. 10/24/18   Jennifer Raza NP  
isosorbide mononitrate ER (IMDUR) 30 mg tablet Take 30 mg by mouth daily. Provider, Historical  
aspirin delayed-release 81 mg tablet Take 81 mg by mouth daily. Provider, Historical  
warfarin (COUMADIN) 2.5 mg tablet Take 2.5 mg by mouth nightly. Needs 5mg on Friday    Provider, Historical  
docusate sodium (COLACE) 50 mg capsule Take 50 mg by mouth daily. Provider, Historical  
multivit-minerals/folic acid (ADULT ONE DAILY MULTIVITAMIN PO) Take 1 Tab by mouth daily. Provider, Historical  
ascorbic acid, vitamin C, (VITAMIN C) 500 mg tablet Take 500 mg by mouth daily.     Provider, Historical  
ferrous gluconate 324 mg (38 mg iron) tablet TAKE 1 TAB BY MOUTH DAILY (BEFORE BREAKFAST). INDICATIONS: IRON DEFICIENCY ANEMIA Patient taking differently: Take 325 mg by mouth two (2) times daily (with meals). 18   Katalina Powers MD  
fluticasone (FLONASE) 50 mcg/actuation nasal spray 2 Sprays by Both Nostrils route daily as needed. Patient taking differently: 2 Sprays by Both Nostrils route daily as needed for Allergies. 3/7/18   Katalina Powers MD  
loratadine (CLARITIN) 10 mg tablet TAKE 1 TAB BY MOUTH DAILY. Patient taking differently: TAKE 1 TAB BY MOUTH DAILY PRN 18   CAROLINA Garcia  
albuterol (PROVENTIL VENTOLIN) 2.5 mg /3 mL (0.083 %) nebulizer solution 3 mL by Nebulization route every four (4) hours as needed for Wheezing. 17   Malachi Quinn NP  
albuterol (VENTOLIN HFA) 90 mcg/actuation inhaler 2 puffs qid prn Patient taking differently: Take 2 Puffs by inhalation every six (6) hours as needed for Wheezing or Shortness of Breath. 17   Malachi Quinn NP  
acetaminophen (TYLENOL) 325 mg tablet Take 650 mg by mouth every four (4) hours as needed for Pain. Provider, Historical  
OXYGEN-AIR DELIVERY SYSTEMS 3 L by Nasal route continuous. Provider, Historical  
 
Allergies Allergen Reactions  Ferrlecit [Sodium Ferric Gluconat-Sucrose] Other (comments) Chest pain, reported MI after administration  Sulfa (Sulfonamide Antibiotics) Hives  Aspirin Nausea Only Cannot take uncoated aspirin Review of Systems:  A comprehensive review of systems was negative except for that written in the HPI. 
+ fatigue, PRUETT, sob, + melena, occasional nausea, poor appetite recently. + falls with poor balance. occas dizziness. She has wounds on her knees Objective:  
 
Vitals: 
Blood pressure 114/73, pulse 73, temperature 97.5 °F (36.4 °C), resp. rate 22, height 5' 2\" (1.575 m), weight 215 lb (97.5 kg), SpO2 94 %. Temp (24hrs), Av.2 °F (36.8 °C), Min:97.5 °F (36.4 °C), Max:98.6 °F (37 °C) Intake and Output: 06/09 0701 - 06/10 1900 In: 270 [P.O.:270] Out: 1500 [Urine:1500] Physical Exam: 
General:  Alert, oriented and mood affect appropriate; happy to see me. Knew who I was right away Lungs:   Clear to auscultation bilaterally but dec bs at bases Heart:  Regular rate and rhythm, S1, S2 stable, + murmur,No click, rub or gallop. Abdomen:   Soft, non-tender. Bowel sounds present. No masses,  No organomegaly. obese Genitourinary: defer Neuro Muscular: Ox3, speech clear. Diffuse generalized prox >d istal weakness, shoulders hindered by pain Skin:  No rashes, lesions, or signs/symptoms or infection. Has xeroform and mepilex dessing to left knee Labs/Studies: 
Recent Results (from the past 72 hour(s)) HGB & HCT Collection Time: 06/07/19  9:12 PM  
Result Value Ref Range HGB 7.8 (L) 11.7 - 15.4 g/dL HCT 25.2 (L) 35.8 - 46.3 % CBC WITH AUTOMATED DIFF Collection Time: 06/08/19  5:15 AM  
Result Value Ref Range WBC 5.6 4.3 - 11.1 K/uL  
 RBC 2.81 (L) 4.05 - 5.2 M/uL HGB 7.9 (L) 11.7 - 15.4 g/dL HCT 25.6 (L) 35.8 - 46.3 % MCV 91.1 79.6 - 97.8 FL  
 MCH 28.1 26.1 - 32.9 PG  
 MCHC 30.9 (L) 31.4 - 35.0 g/dL  
 RDW 15.3 (H) 11.9 - 14.6 % PLATELET 838 (L) 790 - 450 K/uL MPV 12.3 9.4 - 12.3 FL ABSOLUTE NRBC 0.00 0.0 - 0.2 K/uL  
 DF AUTOMATED NEUTROPHILS 64 43 - 78 % LYMPHOCYTES 22 13 - 44 % MONOCYTES 10 4.0 - 12.0 % EOSINOPHILS 4 0.5 - 7.8 % BASOPHILS 0 0.0 - 2.0 % IMMATURE GRANULOCYTES 0 0.0 - 5.0 %  
 ABS. NEUTROPHILS 3.6 1.7 - 8.2 K/UL  
 ABS. LYMPHOCYTES 1.2 0.5 - 4.6 K/UL  
 ABS. MONOCYTES 0.6 0.1 - 1.3 K/UL  
 ABS. EOSINOPHILS 0.2 0.0 - 0.8 K/UL  
 ABS. BASOPHILS 0.0 0.0 - 0.2 K/UL  
 ABS. IMM. GRANS. 0.0 0.0 - 0.5 K/UL METABOLIC PANEL, BASIC Collection Time: 06/08/19  5:15 AM  
Result Value Ref Range Sodium 146 (H) 136 - 145 mmol/L Potassium 5.4 (H) 3.5 - 5.1 mmol/L  Chloride 114 (H) 98 - 107 mmol/L  
 CO2 26 21 - 32 mmol/L  
 Anion gap 6 (L) 7 - 16 mmol/L Glucose 90 65 - 100 mg/dL BUN 89 (H) 8 - 23 MG/DL Creatinine 2.67 (H) 0.6 - 1.0 MG/DL  
 GFR est AA 23 (L) >60 ml/min/1.73m2 GFR est non-AA 19 (L) >60 ml/min/1.73m2 Calcium 9.7 8.3 - 10.4 MG/DL  
GLUCOSE, POC Collection Time: 06/08/19  7:31 AM  
Result Value Ref Range Glucose (POC) 123 (H) 65 - 100 mg/dL PROTHROMBIN TIME + INR Collection Time: 06/08/19 10:55 AM  
Result Value Ref Range Prothrombin time 24.1 (H) 11.7 - 14.5 sec INR 2.2 GLUCOSE, POC Collection Time: 06/08/19 11:37 AM  
Result Value Ref Range Glucose (POC) 143 (H) 65 - 100 mg/dL PLEASE READ & DOCUMENT PPD TEST IN 24 HRS Collection Time: 06/08/19  5:25 PM  
Result Value Ref Range PPD  Negative  
 mm Induration 0 0 - 5 mm PTT Collection Time: 06/08/19  8:04 PM  
Result Value Ref Range aPTT >200.0 (HH) 24.7 - 39.8 SEC  
HGB & HCT Collection Time: 06/08/19  8:04 PM  
Result Value Ref Range HGB 7.7 (L) 11.7 - 15.4 g/dL HCT 24.9 (L) 35.8 - 46.3 % PROTHROMBIN TIME + INR Collection Time: 06/09/19  2:05 AM  
Result Value Ref Range Prothrombin time 23.3 (H) 11.7 - 14.5 sec INR 2.1    
CBC W/O DIFF Collection Time: 06/09/19  2:05 AM  
Result Value Ref Range WBC 6.5 4.3 - 11.1 K/uL  
 RBC 2.52 (L) 4.05 - 5.2 M/uL HGB 7.2 (L) 11.7 - 15.4 g/dL HCT 23.3 (L) 35.8 - 46.3 % MCV 92.5 79.6 - 97.8 FL  
 MCH 28.6 26.1 - 32.9 PG  
 MCHC 30.9 (L) 31.4 - 35.0 g/dL  
 RDW 15.3 (H) 11.9 - 14.6 % PLATELET 933 (L) 146 - 450 K/uL MPV 12.1 9.4 - 12.3 FL ABSOLUTE NRBC 0.00 0.0 - 0.2 K/uL METABOLIC PANEL, BASIC Collection Time: 06/09/19  2:05 AM  
Result Value Ref Range Sodium 143 136 - 145 mmol/L Potassium 5.6 (H) 3.5 - 5.1 mmol/L Chloride 110 (H) 98 - 107 mmol/L  
 CO2 28 21 - 32 mmol/L Anion gap 5 (L) 7 - 16 mmol/L Glucose 130 (H) 65 - 100 mg/dL BUN 74 (H) 8 - 23 MG/DL  Creatinine 2.46 (H) 0.6 - 1.0 MG/DL  
 GFR est AA 25 (L) >60 ml/min/1.73m2 GFR est non-AA 21 (L) >60 ml/min/1.73m2 Calcium 9.2 8.3 - 10.4 MG/DL  
DIFFERENTIAL, AUTO Collection Time: 06/09/19  2:05 AM  
Result Value Ref Range NEUTROPHILS 65 43 - 78 % LYMPHOCYTES 20 13 - 44 % MONOCYTES 10 4.0 - 12.0 % EOSINOPHILS 4 0.5 - 7.8 % BASOPHILS 1 0.0 - 2.0 %  
 ABS. NEUTROPHILS 4.3 1.7 - 8.2 K/UL  
 ABS. LYMPHOCYTES 1.3 0.5 - 4.6 K/UL  
 ABS. MONOCYTES 0.7 0.1 - 1.3 K/UL  
 ABS. EOSINOPHILS 0.2 0.0 - 0.8 K/UL  
 ABS. BASOPHILS 0.0 0.0 - 0.2 K/UL  
 DF AUTOMATED IMMATURE GRANULOCYTES 0 0.0 - 5.0 %  
 ABS. IMM. GRANS. 0.0 0.0 - 0.5 K/UL  
PTT Collection Time: 06/09/19  2:05 AM  
Result Value Ref Range aPTT 164.9 (HH) 24.7 - 39.8 SEC  
GLUCOSE, POC Collection Time: 06/09/19 11:37 AM  
Result Value Ref Range Glucose (POC) 152 (H) 65 - 100 mg/dL PTT Collection Time: 06/09/19 12:57 PM  
Result Value Ref Range aPTT 60.0 (H) 24.7 - 39.8 SEC  
HGB & HCT Collection Time: 06/09/19 12:57 PM  
Result Value Ref Range HGB 8.1 (L) 11.7 - 15.4 g/dL HCT 26.6 (L) 35.8 - 46.3 % GLUCOSE, POC Collection Time: 06/09/19  3:58 PM  
Result Value Ref Range Glucose (POC) 191 (H) 65 - 100 mg/dL PTT Collection Time: 06/09/19  7:00 PM  
Result Value Ref Range aPTT 147.1 (H) 24.7 - 39.8 SEC  
HGB & HCT Collection Time: 06/09/19  7:00 PM  
Result Value Ref Range HGB 7.9 (L) 11.7 - 15.4 g/dL HCT 26.2 (L) 35.8 - 46.3 % GLUCOSE, POC Collection Time: 06/09/19  8:51 PM  
Result Value Ref Range Glucose (POC) 205 (H) 65 - 100 mg/dL PTT Collection Time: 06/10/19  2:02 AM  
Result Value Ref Range aPTT 55.2 (H) 24.7 - 39.8 SEC  
CBC WITH AUTOMATED DIFF Collection Time: 06/10/19  2:02 AM  
Result Value Ref Range WBC 7.1 4.3 - 11.1 K/uL  
 RBC 2.59 (L) 4.05 - 5.2 M/uL HGB 7.3 (L) 11.7 - 15.4 g/dL HCT 23.7 (L) 35.8 - 46.3 %  MCV 91.5 79.6 - 97.8 FL  
 MCH 28.2 26.1 - 32.9 PG  
 MCHC 30.8 (L) 31.4 - 35.0 g/dL  
 RDW 15.2 (H) 11.9 - 14.6 % PLATELET 346 (L) 723 - 450 K/uL MPV 12.0 9.4 - 12.3 FL ABSOLUTE NRBC 0.02 0.0 - 0.2 K/uL  
 DF AUTOMATED NEUTROPHILS 67 43 - 78 % LYMPHOCYTES 19 13 - 44 % MONOCYTES 10 4.0 - 12.0 % EOSINOPHILS 3 0.5 - 7.8 % BASOPHILS 0 0.0 - 2.0 % IMMATURE GRANULOCYTES 1 0.0 - 5.0 %  
 ABS. NEUTROPHILS 4.8 1.7 - 8.2 K/UL  
 ABS. LYMPHOCYTES 1.3 0.5 - 4.6 K/UL  
 ABS. MONOCYTES 0.7 0.1 - 1.3 K/UL  
 ABS. EOSINOPHILS 0.2 0.0 - 0.8 K/UL  
 ABS. BASOPHILS 0.0 0.0 - 0.2 K/UL  
 ABS. IMM. GRANS. 0.1 0.0 - 0.5 K/UL PROTHROMBIN TIME + INR Collection Time: 06/10/19  2:02 AM  
Result Value Ref Range Prothrombin time 19.8 (H) 11.7 - 14.5 sec INR 1.7 METABOLIC PANEL, BASIC Collection Time: 06/10/19  2:02 AM  
Result Value Ref Range Sodium 142 136 - 145 mmol/L Potassium 5.7 (H) 3.5 - 5.1 mmol/L Chloride 109 (H) 98 - 107 mmol/L  
 CO2 30 21 - 32 mmol/L Anion gap 3 (L) 7 - 16 mmol/L Glucose 111 (H) 65 - 100 mg/dL BUN 65 (H) 8 - 23 MG/DL Creatinine 2.28 (H) 0.6 - 1.0 MG/DL  
 GFR est AA 27 (L) >60 ml/min/1.73m2 GFR est non-AA 22 (L) >60 ml/min/1.73m2 Calcium 9.5 8.3 - 10.4 MG/DL  
GLUCOSE, POC Collection Time: 06/10/19  7:06 AM  
Result Value Ref Range Glucose (POC) 108 (H) 65 - 100 mg/dL PTT Collection Time: 06/10/19  9:19 AM  
Result Value Ref Range aPTT 169.2 (HH) 24.7 - 39.8 SEC  
GLUCOSE, POC Collection Time: 06/10/19 11:51 AM  
Result Value Ref Range Glucose (POC) 153 (H) 65 - 100 mg/dL TYPE + CROSSMATCH Collection Time: 06/10/19 12:36 PM  
Result Value Ref Range Crossmatch Expiration 06/13/2019 ABO/Rh(D) A POSITIVE Antibody screen NEG Unit number [id number] Blood component type RC LRIR Unit division 00 Status of unit ISSUED Crossmatch result Compatible HGB & HCT Collection Time: 06/10/19 12:38 PM  
Result Value Ref Range HGB 7.7 (L) 11.7 - 15.4 g/dL HCT 25.1 (L) 35.8 - 46.3 % PTT Collection Time: 06/10/19  3:06 PM  
Result Value Ref Range aPTT 36.2 24.7 - 39.8 SEC  
GLUCOSE, POC Collection Time: 06/10/19  4:04 PM  
Result Value Ref Range Glucose (POC) 133 (H) 65 - 100 mg/dL Functional Assessment: 
Functional Assessment Fall in Past 12 Months: Yes Approximate Date of Fall: 6/3/19 Decline in Gait/Transfer/Balance: No 
Decline in Capacity to Feed/Dress/Bathe: No 
Developmental Delay: No 
Chewing/Swallowing Problems: No 
Difficulty with Secretions: No 
Speech Slurred/Thick/Garbled: No  
 
Callahan Score: 
   
 
Speech Assessment: 
 
  
    
      
 
Ambulation: Activity and Safety Activity Level: Up with Assistance Ambulate: Ambulate to bathroom Activity: In bed, Resting quietly Activity Assistance: Partial (one person) Weight Bearing Status: WBAT (Weight Bearing as Tolerated) Mode of Transportation: Stretcher Repositioned: Head of bed elevated (degrees) Patient Turned: Turns self Head of Bed Elevated: Self regulated Activity Response: Tolerated well Assistive Device: Fall prevention device Safety Measures: Bed/Chair-Wheels locked, Bed in low position, Call light within reach, Fall prevention (comment), Gripper socks, Side rails X2 Impression/Plan:  
 
Principal Problem: 
  Symptomatic anemia (6/6/2019) Active Problems: 
  MDS (myelodysplastic syndrome) ([facility] [address] (12/17/2011) Overview: Procrit started in August, 2011 12/18/11 Procrit weekly and Iron stores. 5-12 12-13-12 good response to 3 weekly procrit did not need it last time 3-7-13 Pt doing well. Just wanted a \"check-up. \" Responding to Procrit  
    every three weeks. 8-29-13 patient has missed some injections on recently restarted  
    hemoglobin only issue , takes oral iron iron stores each time Essential hypertension, benign (1/20/2013) CKD (chronic kidney disease) stage 4, GFR 15-29 ml/min ([facility]) (7/10/2013) COPD (chronic obstructive pulmonary disease) ([facility] [address] (4/2/2014) Overview: HOME OXYGEN 3 LITERS 
 
  HLD (hyperlipidemia) () 
 
  S/P AVR (aortic valve replacement) () Overview: Mechanical/Artific Type 2 diabetes mellitus with nephropathy ([facility] [address] (12/18/2017) Systolic CHF, acute on chronic ([facility] (12/31/2018) Hyperkalemia (6/6/2019) Right shoulder pain (6/6/2019) Acute on chronic physical debility due to multiple active medical comorbidities Recommendations: Continue Acute Rehab Program 
Coordination of rehab/medical care Counseling of PM & R care issues management Monitoring and management of medical conditions per plan of care/orders -Ms Diaz has been to [facility] before and had difficulty keeping up with the 3hr therapy/d rule per Medicare. She is easily fatigued . We talked about the need to be closer to family and possibly not live alone. She does plan to [place] back [place] the [place] from [facility] to be closer. She is open to STR in a SNF setting. She does not feel that [facility] is for her at this time. She is upset with herself for being \"hard headed\" and not caring for herself.  
-ppd, CM, STR referrals; [address] are first choices. Would also consider [place]. Cannot afford prison. Discussion with pt and staff Reviewed Therapies/Labs/Meds/Records Jas Casanova MD

## 2019-06-10 NOTE — PROGRESS NOTES
[facility] CARDIOLOGY PROGRESS NOTE 
      
 
6/10/2019 12:31 PM 
 
Admit Date: 6/6/2019 Subjective:  
 
 Somnolent this morning CPAP in place Review of Systems Cardiovascular: Negative for chest pain. Objective:  
  
Vitals:  
 06/10/19 2497 06/10/19 0542 06/10/19 5736 06/10/19 6155 BP: 115/68   107/57 Pulse: 70   76 Resp: 20   18 Temp: 97.9 °F (36.6 °C)   98.4 °F (36.9 °C) SpO2: 96%  93% 100% Weight:  97.5 kg (215 lb) Height:      
 
 
 
Physical Exam  
Constitutional: She appears lethargic. HENT:  
Head: Normocephalic and atraumatic. Eyes: Pupils are equal, round, and reactive to light. Conjunctivae are normal.  
Neck: Normal range of motion. No JVD present. Cardiovascular: Normal rate. Murmur heard. Pulmonary/Chest: Effort normal. She has decreased breath sounds in the right lower field and the left lower field. Abdominal: She exhibits no distension. Neurological: She appears lethargic. Skin: Skin is warm. She is not diaphoretic. Psychiatric: Her affect is blunt. Data Review:  
Recent Labs  
  06/10/19 
0202 06/09/19 
1900  06/09/19 
0205   --   --  143  
K 5.7*  --   --  5.6*  
BUN 65*  --   --  74* CREA 2.28*  --   --  2.46* *  --   --  130* WBC 7.1  --   --  6.5 HGB 7.3* 7.9*   < > 7.2* HCT 23.7* 26.2*   < > 23.3*  
*  --   --  100* INR 1.7  --   --  2.1  
 < > = values in this interval not displayed. Intake/Output Summary (Last 24 hours) at 6/10/2019 1014 Last data filed at 6/10/2019 3601 Gross per 24 hour Intake 150 ml Output 1050 ml Net -900 ml Current Facility-Administered Medications Medication Dose Route Frequency  polyethylene glycol (MIRALAX) powder 255 g  255 g Oral ONCE  
 bisacodyl (DULCOLAX) tablet 10 mg  10 mg Oral ONCE  
 furosemide (LASIX) injection 40 mg  40 mg IntraVENous ONCE  polyethylene glycol (MIRALAX) packet 17 g  17 g Oral BID  
  patiromer calcium sorbitex (VELTASSA) powder 8.4 g  8.4 g Oral DAILY  ferrous gluconate 324 mg (38 mg iron) tablet 1 Tab  1 Tab Oral BID WITH MEALS  
 heparin 25,000 units in dextrose 500 mL infusion  12-25 Units/kg/hr IntraVENous TITRATE  pantoprazole (PROTONIX) 40 mg in sodium chloride 0.9% 10 mL injection  40 mg IntraVENous Q12H  
 albuterol (PROVENTIL VENTOLIN) nebulizer solution 2.5 mg  2.5 mg Nebulization Q4H PRN  
 carvedilol (COREG) tablet 6.25 mg  6.25 mg Oral BID WITH MEALS  sertraline (ZOLOFT) tablet 50 mg  50 mg Oral DAILY  simvastatin (ZOCOR) tablet 20 mg  20 mg Oral QHS  traZODone (DESYREL) tablet 50 mg  50 mg Oral QHS  vitamin E (AQUA GEMS) capsule 400 Units (Patient Supplied)  400 Units Oral DAILY  sodium chloride (NS) flush 5-40 mL  5-40 mL IntraVENous Q8H  
 sodium chloride (NS) flush 5-40 mL  5-40 mL IntraVENous PRN  
 acetaminophen (TYLENOL) tablet 650 mg  650 mg Oral Q4H PRN  
 HYDROcodone-acetaminophen (NORCO) 5-325 mg per tablet 1 Tab  1 Tab Oral Q4H PRN  
 naloxone (NARCAN) injection 0.4 mg  0.4 mg IntraVENous PRN  
 ondansetron (ZOFRAN) injection 4 mg  4 mg IntraVENous Q4H PRN  
 bisacodyl (DULCOLAX) tablet 10 mg  10 mg Oral DAILY PRN  
 0.9% sodium chloride infusion 250 mL  250 mL IntraVENous PRN  
 budesonide (PULMICORT) 500 mcg/2 ml nebulizer suspension  500 mcg Nebulization BID RT And  
 albuterol (PROVENTIL VENTOLIN) nebulizer solution 2.5 mg  2.5 mg Nebulization Q6HWA RT  
 diphenhydrAMINE (BENADRYL) capsule 25 mg  25 mg Oral Q4H PRN Assessment/Plan:  
 
History of chronic kidney disease stage IV COPD and myelodysplastic syndrome with anemia PCI to LAD 2014 status post mechanical aortic valve replacement on chronic warfarin therapy with severe left ventricular systolic dysfunction with EF 25%. Status post Biotronik ICD. Admitted for severe weakness and anemia 1. Symptomatic anemia most likely multifactorial oncology GI  addressing 2. Hypertension hypotensive hold imdur today 3. Cardiomyopathy on carvedilol afterload reducing agents isosorbide and hydralazine. Not a candidate for ace or arb  therapy due to kidney disease. Weight up I&O's not charted may need medina 4. Mechanical valve in aortic position initiated on heparin drip see guiidlines listed below. Difficult situation due to anemia 5. FELIZ improving hyperkalemic Lasix IV today 6/10 Continuation of VKA anticoagulation with a therapeutic INR is recommended in patients with mechanical heart valves 
undergoing minor procedures (such as dental extractions or cataract removal) where bleeding is easily controlled. Temporary interruption of VKA anticoagulation, without bridging agents while the INR is subtherapeutic, is recommended in patients with a bileaflet mechanical AVR and no other risk factors for thrombosis who are undergoing invasive or surgical procedures. Bridging therapy with either intravenous unfractionated heparin or low-molecular-weight heparin has evolved empirically to reduce thromboembolic 
events during temporary interruption of oral anticoagulation in higher-risk patients, such as those with a mechanical MVR or AVR and additional risk 
factors for thromboembolism (eg, AF, previous thromboembolism, hypercoagulable condition, older-generation mechanical valves [ball-cage or tilting 
disc], LV systolic dysfunction, or >1 mechanical valve). When interruption of oral VKA therapy is deemed necessary, the agent is usually stopped 3 to 4 days before the procedure (so the INR falls to 
<1.5 for major surgical procedures) and is restarted postoperatively as soon as bleeding risk allows, typically 12 to 24 hours after surgery.  Bridging 
anticoagulation with intravenous unfractionated heparin or subcutaneous low-molecular-weight heparin is started when the INR falls below the 
therapeutic threshold (ie, 2.0 or 2.5, depending on the clinical context), usually 36 to 48 hours before surgery, and is stopped 4 to 6 hours (for 
intravenous unfractionated heparin) or 12 hours (for subcutaneous low-molecular-weight heparin) before the procedure. There are no randomized comparative-effectiveness trials evaluating a strategy of bridging versus no bridging in adequate numbers of patients 
with prosthetic heart valves needing temporary interruption of oral anticoagulant therapy, although such studies are ongoing. The evidence used to 
support bridging therapy derives from cohort studies with poor or no comparator groups. 2  In patient groups other than those with mechanical 
heart valves, increasing concerns have surfaced that bridging therapy exposes patients to higher bleeding risks without reducing the risk of 
thromboembolism. 199 Accordingly, decisions about bridging should be individualized and should account for the trade-offs between thrombosis and 
bleeding. 2017 AHA/ACC Focused Update of the 
2014 AHA/ACC Guideline for the Management 
of Patients With Valvular Heart Disease Shama Coy MD 
6/10/2019 12:31 PM

## 2019-06-10 NOTE — PROGRESS NOTES
Hourly rounds completed. Patient alert and oriented. Ambulating to bathroom with assitance. Tolerating diet. Will begin bowel prep for ordered EGD and Colonoscopy tomorrow. Will be NPO at midnight. Heparin gtt to be help starting at 6AM tomorrow morning. Dressing to L knee changed. Applied xeroform and mepilex dressing. Consulted wound care. Patient is seen by outpatient wound care and Dr. Caden Hidalgo. No needs at this time. Will continue to monitor and report to oncoming RN.

## 2019-06-10 NOTE — PROGRESS NOTES
Problem: Breathing Pattern - Ineffective Goal: *Absence of hypoxia Outcome: Progressing Towards Goal 
Goal: *Use of effective breathing techniques Outcome: Progressing Towards Goal 
Goal: *PALLIATIVE CARE:  Alleviation of Dyspnea Outcome: Progressing Towards Goal 
  
Taken off patient home Trilogy machine and placed on 3.5L. Patient tolerated change well.

## 2019-06-10 NOTE — PROGRESS NOTES
Pt experiencing frequency this shift secondary to lassix administration. Due to frequency, pt is experiencing some exertional dyspnea. No other s/sx of distress. Hourly rounds performed through shift, pt denies needs at this time. Bed in low position and call light/ personal items within reach. Will continue to monitor and report to day shift nurse.

## 2019-06-11 NOTE — PROGRESS NOTES
TRANSFER - OUT REPORT: 
 
Verbal report given to DEWAYNE Jones on [employer]  being transferred to GI Lab for ordered procedure Report consisted of patients Situation, Background, Assessment and  
Recommendations(SBAR). Information from the following report(s) SBAR was reviewed with the receiving nurse. Lines:  
Peripheral IV 06/06/19 Right External jugular (Active) Site Assessment Clean, dry, & intact 6/11/2019  7:00 AM  
Phlebitis Assessment 0 6/11/2019  7:00 AM  
Infiltration Assessment 0 6/11/2019  7:00 AM  
Dressing Status Clean, dry, & intact 6/11/2019  7:00 AM  
Dressing Type Transparent;Tape 6/11/2019  7:00 AM  
Hub Color/Line Status Flushed 6/11/2019  7:00 AM  
Alcohol Cap Used No 6/11/2019  7:00 AM  
   
Peripheral IV 06/10/19 Left Forearm (Active) Site Assessment Clean, dry, & intact 6/11/2019  7:00 AM  
Phlebitis Assessment 0 6/11/2019  7:00 AM  
Infiltration Assessment 0 6/11/2019  7:00 AM  
Dressing Status Clean, dry, & intact 6/11/2019  7:00 AM  
Dressing Type Transparent;Tape 6/11/2019  7:00 AM  
Hub Color/Line Status Flushed 6/11/2019  7:00 AM  
Alcohol Cap Used No 6/11/2019  7:00 AM  
  
 
Opportunity for questions and clarification was provided. Patient transported with: 
 [employer]

## 2019-06-11 NOTE — PROGRESS NOTES
Hospitalist Progress Note 6/10/2019 Admit Date: 2019  2:50 PM  
NAME: Creighton Schirmer :  1950 MRN:  [id number] Attending: Matteo Xavier DO 
PCP:  Tiffanie Medina MD 
 
SUBJECTIVE:  
67 yo female with PMH significant for myelodisplastic syndrome, DM2, CKD stage 4, HTN, sHF, HLD, COPD, AVR on chronic coumadin. Pt presented to the ED via EMS with report of fatigue. Pt also states that she has missed her recent iron injections. Pt initially denied any falls but did recall a fall which injured her right arm/shoulder a few days prior. On admit it was found that pt hgb 7.1, K+ also 7.1, Cr 3.71, . Stool was FOB positive. CXR negative, EKG LBBB, axis deviation to the left. UA unremarkable. Pt admitted to hospitalist for symptomatic anemia, Ac on CKD, hyperkalemia and ? GIB 
GI, Cardiology, Hematology and Nephrology following. 6/10 - pt reports feeling \"tightness\" with breathing and offered breathing treatment and lasix. Denies chest pain, nausea, vomiting. No melena/hematochezia reported Review of Systems negative with exception of pertinent positives noted above PHYSICAL EXAM  
 
Visit Vitals /73 Pulse 73 Temp 97.5 °F (36.4 °C) Resp 22 Ht 5' 2\" (1.575 m) Wt 97.5 kg (215 lb) SpO2 94% BMI 39.32 kg/m² Temp (24hrs), Av.2 °F (36.8 °C), Min:97.5 °F (36.4 °C), Max:98.6 °F (37 °C) Oxygen Therapy O2 Sat (%): 94 % (06/10/19 1839) Pulse via Oximetry: 70 beats per minute (06/10/19 133) O2 Device: Nasal cannula (06/10/19 1332) O2 Flow Rate (L/min): 3 l/min (06/10/19 133) FIO2 (%): 32 % (06/10/19 0123) Intake/Output Summary (Last 24 hours) at 6/10/2019 2017 Last data filed at 6/10/2019 1250 Gross per 24 hour Intake 150 ml Output 900 ml Net -750 ml General: No acute distress  obese Lungs:  Diminished w/o wheezing or rhonchi Heart:  Regular rate and rhythm,systolic  click present, 
 Abdomen: Soft, Non distended, Non tender, Positive bowel sounds Extremities: No cyanosis, clubbing or edema Neurologic:  No focal deficits ASSESSMENT Active Hospital Problems Diagnosis Date Noted  Symptomatic anemia 06/06/2019  Hyperkalemia 06/06/2019  Right shoulder pain 06/06/2019  Systolic CHF, acute on chronic ([facility] 12/31/2018  Type 2 diabetes mellitus with nephropathy ([facility] 12/18/2017  S/P AVR (aortic valve replacement) Mechanical/Artific  HLD (hyperlipidemia)  COPD (chronic obstructive pulmonary disease) ([facility] 04/02/2014 HOME OXYGEN 3 LITERS 
  
 CKD (chronic kidney disease) stage 4, GFR 15-29 ml/min ([facility] 07/10/2013  Essential hypertension, benign 01/20/2013  MDS (myelodysplastic syndrome) ([facility] 12/17/2011 Procrit started in August, 2011 12/18/11 Procrit weekly and Iron stores. 5-12 12-13-12 good response to 3 weekly procrit did not need it last time 3-7-13 Pt doing well. Just wanted a \"check-up. \" Responding to Procrit every three weeks. 8-29-13 patient has missed some injections on recently restarted hemoglobin only issue , takes oral iron iron stores each time A/p - Symptomatic anemia - likely multifactorial from hx of MDS and now GIB. Heme/ GI following. Procrit given 6/7. Cont Iron 
 
- GI bleed - occ pos in ER. Report of red blood on tissue on 6/7. H&H trending down to 7.3 this AM. Requested additional unit of PRBC (2 units since admit) Cont IV protonix, serial h&H 
GI plans EGD/COLO in AM - off heparin 6hrs prior per GI 
 
- MDS - heme/onc following. Procrit on 6/7. Cont iron 
 
- mechanical AVR - holding coumadin, cont heparin window 
 
- acute on CKD - stage 4. Nephrology following. Renal US wnl. Holding aldactone and entresto. Cr down-trending. Reusme daily lasix - Hyperkalemia - started on valtessa. Repeat bmp in AM 
 
- hypernatremia - corrected, monitor off ivf - systolic CHF - resume daily lasix. Monitor I/o and renal function. - right shoulder pain - s/p fall. xrays neg for fx.  
 
- COPD - stable, supplemental ox prn. Cont home nebs DVT Prophylaxis: hep gtt Dispo - likely home with HHC. Cont PT/OT. PPD ordered. Signed By: Jayne Patel DO   
 Alaina 10, 2019

## 2019-06-11 NOTE — INTERVAL H&P NOTE
H&P Update: 
Daniel Dorsey was seen and examined. History and physical has been reviewed. The patient has been examined.  There have been no significant clinical changes since the completion of the originally dated History and Physical.

## 2019-06-11 NOTE — PROGRESS NOTES
[facility] CARDIOLOGY PROGRESS NOTE 
      
 
6/11/2019 10:30 AM 
 
Admit Date: 6/6/2019 Subjective:  
 
Drowsy; no pain. Planned EGD/colonoscopy today ROS: 
Cardiovascular:  As noted above Objective:  
  
Vitals:  
 06/11/19 1314 06/11/19 1328 06/11/19 1353 06/11/19 1411 BP: 118/70 131/67  123/83 Pulse: 86 78  78 Resp: 22 16  18 Temp: 98.1 °F (36.7 °C) SpO2: 95% 96% 96% 96% Weight:      
Height:      
 
 
Physical Exam: 
General-No Acute Distress Neck- supple, no JVD 
CV- regular rate and rhythm; low grade ONEIL; crisp S2 Lung- clear bilaterally Abd- soft, nontender, nondistended Ext- tr edema bilaterally. Skin- warm and dry Data Review:  
Recent Labs  
  06/11/19 
1244 06/11/19 
0500  06/10/19 
0202  06/09/19 
0205 NA  --  140  --  142  --  143 K  --  5.1  --  5.7*  --  5.6*  
BUN  --  54*  --  65*  --  74* CREA  --  2.29*  --  2.28*  --  2.46* GLU  --  89  --  111*  --  130* WBC  --  7.6  --  7.1  --  6.5 HGB 8.6* 8.8*   < > 7.3*   < > 7.2* HCT 27.8* 28.5*   < > 23.7*   < > 23.3*  
PLT  --  132*  --  109*  --  100* INR  --   --   --  1.7  --  2.1  
 < > = values in this interval not displayed. Assessment/Plan:  
 
Principal Problem: 
  Symptomatic anemia (6/6/2019) Active Problems: 
  MDS (myelodysplastic syndrome) ([facility] (12/17/2011) Essential hypertension, benign (1/20/2013) CKD (chronic kidney disease) stage 4, GFR 15-29 ml/min ([facility]) (7/10/2013) COPD (chronic obstructive pulmonary disease) ([facility] (4/2/2014) HLD (hyperlipidemia) () 
 
  S/P AVR (aortic valve replacement) () Type 2 diabetes mellitus with nephropathy ([address] (12/18/2017) Systolic CHF, acute on chronic ([facility] (12/31/2018) Hyperkalemia (6/6/2019) Right shoulder pain (6/6/2019) Off heparin for planned procedure; restart post procedure. Needs bridging with mechanical AV valve in the setting LV dysfn. No active bleeding; likely anemia multifactorial in the setting of CKD/MDS/CHRIS Stable/improved renal function. Reassess ACEI/ARBs based on renal function.   
 
Lisa Bose MD 
6/11/2019 2:46 PM

## 2019-06-11 NOTE — PROGRESS NOTES
RT made aware pt feeling SOB. Breathing tx due. Oxygen saturation 99% and pt taking deep breaths and using relaxation techniques but unable to say more then a few words without becoming SOB. O2 via NC at 4L.

## 2019-06-11 NOTE — PROGRESS NOTES
Transfer of care. Report received from off going DEWAYNE Gilmore at bedside. Patient with opened eyes, lying in bed with breathing mask on home trilogy. Oriented patient and family to staff and plan of care. Patient is alert and oriented x 4, S/P symptomatic anemia. Patient is resting in bed, currently receiving 1 unit of P RBC with shallow and labored respirations noted under her trilogy mask. Patient is to drink bowel prep for EGD and colonoscopy procedures tomorrow. Mask removed to allow patient to drink the prep at this time. No needs voiced at this moment. Assessment to follow, see for details.

## 2019-06-11 NOTE — ROUTINE PROCESS
TRANSFER - OUT REPORT: 
 
Verbal report given to Rachel Monae RN(name) on  [employer]  being transferred to 6th floor(unit) for routine post - op Report consisted of patients Situation, Background, Assessment and  
Recommendations(SBAR). Information from the following report(s) SBAR, Kardex, Procedure Summary, Intake/Output and MAR was reviewed with the receiving nurse. Lines:  
Peripheral IV 06/06/19 Right External jugular (Active) Site Assessment Clean, dry, & intact 6/11/2019  7:00 AM  
Phlebitis Assessment 0 6/11/2019  7:00 AM  
Infiltration Assessment 0 6/11/2019  7:00 AM  
Dressing Status Clean, dry, & intact 6/11/2019  7:00 AM  
Dressing Type Transparent;Tape 6/11/2019  7:00 AM  
Hub Color/Line Status Flushed 6/11/2019  7:00 AM  
Alcohol Cap Used No 6/11/2019  7:00 AM  
   
Peripheral IV 06/10/19 Left Forearm (Active) Site Assessment Clean, dry, & intact 6/11/2019  7:00 AM  
Phlebitis Assessment 0 6/11/2019  7:00 AM  
Infiltration Assessment 0 6/11/2019  7:00 AM  
Dressing Status Clean, dry, & intact 6/11/2019  7:00 AM  
Dressing Type Transparent;Tape 6/11/2019  7:00 AM  
Hub Color/Line Status Flushed 6/11/2019  7:00 AM  
Alcohol Cap Used No 6/11/2019  7:00 AM  
  
 
Opportunity for questions and clarification was provided.

## 2019-06-11 NOTE — ANESTHESIA POSTPROCEDURE EVALUATION
Procedure(s): ESOPHAGOGASTRODUODENOSCOPY (EGD) Hep drip stopped at 0600 COLONOSCOPY/ BMI 40 ROOM 637 ESOPHAGOGASTRODUODENAL (EGD) BIOPSY ENDOSCOPIC POLYPECTOMY. total IV anesthesia Anesthesia Post Evaluation Patient location during evaluation: PACU Patient participation: complete - patient participated Level of consciousness: awake Pain management: adequate Airway patency: patent Anesthetic complications: no 
Cardiovascular status: acceptable Respiratory status: spontaneous ventilation and acceptable Hydration status: acceptable Post anesthesia nausea and vomiting:  none Vitals Value Taken Time /57 6/11/2019  3:12 PM  
Temp 36.1 °C (97 °F) 6/11/2019  2:55 PM  
Pulse 76 6/11/2019  3:20 PM  
Resp 18 6/11/2019  3:02 PM  
SpO2 94 % 6/11/2019  3:20 PM  
Vitals shown include unvalidated device data.

## 2019-06-11 NOTE — ANESTHESIA PREPROCEDURE EVALUATION
Anesthetic History No history of anesthetic complications Review of Systems / Medical History Patient summary reviewed and pertinent labs reviewed Pulmonary COPD: severe Sleep apnea: CPAP Neuro/Psych  
 
 
 
TIA Cardiovascular Hypertension: well controlled Valvular problems/murmurs (aortic valve replacement times 2) CHF Dysrhythmias Pacemaker (ICD), past MI, CAD and hyperlipidemia Exercise tolerance: <4 METS Comments: Echo from 11/2018 showed EF 20-25% as well as RV dysfunction, markedly dilated LA. On Coumadin for mechanical AV. Transitioned to Hep gtt. Known pulmonary HTN. GI/Hepatic/Renal 
  
GERD: well controlled Renal disease: CRI Endo/Other Diabetes: type 2, using insulin Morbid obesity, arthritis (OA), cancer (MDS) and anemia (Iron Deficiency) Other Findings Physical Exam 
 
Airway Mallampati: III 
TM Distance: 4 - 6 cm Neck ROM: normal range of motion Mouth opening: Normal 
 
 Cardiovascular Rhythm: irregular Rate: abnormal 
 
Murmur: Grade 2, Aortic area Dental 
 
Dentition: Full lower dentures and Full upper dentures Pulmonary Decreased breath sounds: bilateral 
 
 
Prolonged expiration Abdominal 
 
 
 
 Other Findings Anesthetic Plan ASA: 4 Anesthesia type: total IV anesthesia Induction: Intravenous Anesthetic plan and risks discussed with: Patient

## 2019-06-11 NOTE — PROGRESS NOTES
Problem: Falls - Risk of 
Goal: *Absence of Falls Description Document Alejandro Navas Fall Risk and appropriate interventions in the flowsheet. Outcome: Progressing Towards Goal 
  
Problem: Patient Education: Go to Patient Education Activity Goal: Patient/Family Education Outcome: Progressing Towards Goal 
  
Problem: Breathing Pattern - Ineffective Goal: *Absence of hypoxia Outcome: Progressing Towards Goal 
Goal: *Use of effective breathing techniques Outcome: Progressing Towards Goal 
Goal: *PALLIATIVE CARE:  Alleviation of Dyspnea Outcome: Progressing Towards Goal 
  
Problem: Patient Education: Go to Patient Education Activity Goal: Patient/Family Education Outcome: Progressing Towards Goal 
  
Problem: Patient Education: Go to Patient Education Activity Goal: Patient/Family Education Outcome: Progressing Towards Goal

## 2019-06-11 NOTE — PROGRESS NOTES
Hourly rounds completed. Patient alert and oriented. Doing well s/p EGD/colonoscopy. Ambulating to bathroom with assistance. Tolerating diet. No pain, n/v.  No needs at this time. Will continue to monitor and report to oncoming RN.

## 2019-06-11 NOTE — PROGRESS NOTES
[facility] PROGRESS NOTE Follow up for: 
 
Subjective:  
Patient seen and examined. Feeling okay. Renal function is improving. K is better ROS: 
Gen - no fever, no chills, appetite okay CV - no chest pain, no orthopnea Lung - no shortness of breath, no cough Abd - no tenderness, no nausea, no vomiting Ext - no edema Objective:  
Exam: 
Vitals:  
 06/11/19 4610 06/11/19 0542 06/11/19 0818 06/11/19 0073 BP: 105/63   120/64 Pulse: 75   82 Resp: 20   20 Temp: 98.3 °F (36.8 °C)   98.3 °F (36.8 °C) SpO2: 93%  95% 95% Weight:  98.6 kg (217 lb 6.4 oz) Height:      
 
 
 
Intake/Output Summary (Last 24 hours) at 6/11/2019 1026 Last data filed at 6/11/2019 0600 Gross per 24 hour Intake 1492.21 ml Output 450 ml Net 1042.21 ml  
 
 
Current Facility-Administered Medications Medication Dose Route Frequency  0.9% sodium chloride infusion 250 mL  250 mL IntraVENous PRN  
 furosemide (LASIX) tablet 40 mg  40 mg Oral DAILY  polyethylene glycol (MIRALAX) packet 17 g  17 g Oral BID  patiromer calcium sorbitex (VELTASSA) powder 8.4 g  8.4 g Oral DAILY  ferrous gluconate 324 mg (38 mg iron) tablet 1 Tab  1 Tab Oral BID WITH MEALS  
 heparin 25,000 units in dextrose 500 mL infusion  12-25 Units/kg/hr IntraVENous TITRATE  pantoprazole (PROTONIX) 40 mg in sodium chloride 0.9% 10 mL injection  40 mg IntraVENous Q12H  
 albuterol (PROVENTIL VENTOLIN) nebulizer solution 2.5 mg  2.5 mg Nebulization Q4H PRN  
 carvedilol (COREG) tablet 6.25 mg  6.25 mg Oral BID WITH MEALS  sertraline (ZOLOFT) tablet 50 mg  50 mg Oral DAILY  simvastatin (ZOCOR) tablet 20 mg  20 mg Oral QHS  traZODone (DESYREL) tablet 50 mg  50 mg Oral QHS  vitamin E (AQUA GEMS) capsule 400 Units (Patient Supplied)  400 Units Oral DAILY  sodium chloride (NS) flush 5-40 mL  5-40 mL IntraVENous Q8H  
 sodium chloride (NS) flush 5-40 mL  5-40 mL IntraVENous PRN  
  acetaminophen (TYLENOL) tablet 650 mg  650 mg Oral Q4H PRN  
 HYDROcodone-acetaminophen (NORCO) 5-325 mg per tablet 1 Tab  1 Tab Oral Q4H PRN  
 naloxone (NARCAN) injection 0.4 mg  0.4 mg IntraVENous PRN  
 ondansetron (ZOFRAN) injection 4 mg  4 mg IntraVENous Q4H PRN  
 bisacodyl (DULCOLAX) tablet 10 mg  10 mg Oral DAILY PRN  
 0.9% sodium chloride infusion 250 mL  250 mL IntraVENous PRN  
 budesonide (PULMICORT) 500 mcg/2 ml nebulizer suspension  500 mcg Nebulization BID RT And  
 albuterol (PROVENTIL VENTOLIN) nebulizer solution 2.5 mg  2.5 mg Nebulization Q6HWA RT  
 diphenhydrAMINE (BENADRYL) capsule 25 mg  25 mg Oral Q4H PRN  
 
 
EXAM 
GEN - Alert and awake. CV - S1, S2, RRR, no rub, murmur, or gallop Lung - clear to auscultation bilaterally Abd - soft, nontender, BS present Ext - trace edema Recent Labs  
  06/11/19 
0500 06/10/19 
2353 06/10/19 
1238 06/10/19 
0202  06/09/19 
0205 WBC 7.6  --   --  7.1  --  6.5 HGB 8.8* 8.9* 7.7* 7.3*   < > 7.2* HCT 28.5* 29.2* 25.1* 23.7*   < > 23.3*  
*  --   --  109*  --  100*  
 < > = values in this interval not displayed. Recent Labs  
  06/11/19 
0500 06/10/19 
0202 06/09/19 
0205  142 143  
K 5.1 5.7* 5.6*  
 109* 110* CO2 29 30 28 BUN 54* 65* 74* CREA 2.29* 2.28* 2.46* CA 9.8 9.5 9.2 GLU 89 111* 130* Assessment and Plan: FELIZ on CKD-  Cr is stable today. Continue to monitor renal function and UOP. Hyperkalemia- May be related heparin gtt. K is better today. On [place]. Monitoring. Anemia- Hb is stable. GI planning upper/lower today.  
 
 
Robina Rey MD

## 2019-06-11 NOTE — WOUND CARE
Patient seen for left knee wound. Noted area with peeling thin scab, no open area noted underneath. It came of with gentle cleansing. Did re-seal silicone foam for protection as it is still fragile. 2x3 cm area. Patient being followed by Dr Hildegarde Scheuermann in outpatient clinic. Patient had no questions this am. Will follow loosely.

## 2019-06-11 NOTE — PROGRESS NOTES
Shift Summary Hourly rounds performed on pt, pt resting in bed quietly with head the bed elevated, with shallow and labored respirations on home trilogy overnight. Patient had an uneventful shift. Complete the infusion of 1 unit of P RBC. Afebrile, VSS, great output noted with multiple wet briefs. No distress noted or reported. Patient remained alert and oriented x 4. Left forearm, and right IJ IV sites assessed, flushed, remained patent. Heparin drip titrated from 9 Units/kg to 13 units /kg this shift. Heparin drip stopped at 06:00 Am as ordered. Care continue till transfer of care is done to up coming RN.

## 2019-06-11 NOTE — PROGRESS NOTES
Progress Note Patient: Michoacano Moore MRN: [id number]  SSN: [id number] YOB: 1950  Age: 68 y.o. Sex: female Admit Date: 6/6/2019 LOS: 5 days Subjective:  
 
PMH significant for myelodisplastic syndrome, DM2, CKD stage 4, HTN, sHF, HLD, COPD, AVR on chronic coumadin. Admitted due to fatigue and found to be anemia more than her baseline and FELIZ. positive fecal occult blood. Patient is for EGD and colonoscopy today. No abdominal pain. No chest pain. No shortness of breath. Objective:  
 
Vitals:  
 06/11/19 1126 06/11/19 0542 06/11/19 0818 06/11/19 8086 BP: 105/63   120/64 Pulse: 75   82 Resp: 20   20 Temp: 98.3 °F (36.8 °C)   98.3 °F (36.8 °C) SpO2: 93%  95% 95% Weight:  98.6 kg (217 lb 6.4 oz) Height:      
  
 
Intake and Output: 
Current Shift: No intake/output data recorded. Last three shifts: 06/09 1901 - 06/11 0700 In: 1642.2 [P.O.:630; I.V.:1012.2] Out: 900 [Urine:900] Physical Exam:  
 
General:                    The patient is a pleasant elderly female in no acute distress. She is lying flat in bed. Head:                                   Normocephalic/atraumatic. Eyes:                                   palpebral pallor, no scleral icterus. ENT:                                    External auricular and nasal exam within normal limits. Mucous membranes are moist. 
Neck:                                   Supple, non-tender, no JVD. Lungs:                       Clear to auscultation bilaterally without wheezes or crackles. No respiratory distress or accessory muscle use. Heart:                                  Regular rate and rhythm, without murmurs, rubs, or gallops. Abdomen:                  Soft, non-tender, non-distended with normoactive bowel sounds. Genitourinary:           No tenderness over the bladder or bilateral CVAs. Extremities:               Without clubbing, cyanosis, or edema. Skin:                                    Normal color, texture, and turgor. No rashes, lesions, or jaundice. Pulses:                      Radial and dorsalis pedis pulses present 2+ bilaterally. Capillary refill <2s. Neurologic:                CN II-XII grossly intact and symmetrical.  
                                            Moving all four extremities well with no focal deficits. Psychiatric:                Pleasant demeanor, appropriate affect. Alert and oriented x 3 Lab/Data Review: 
 
Recent Results (from the past 24 hour(s)) TYPE + CROSSMATCH Collection Time: 06/10/19 12:36 PM  
Result Value Ref Range Crossmatch Expiration 06/13/2019 ABO/Rh(D) A POSITIVE Antibody screen NEG Unit number [id number] Blood component type RC LRIR Unit division 00 Status of unit TRANSFUSED Crossmatch result Compatible HGB & HCT Collection Time: 06/10/19 12:38 PM  
Result Value Ref Range HGB 7.7 (L) 11.7 - 15.4 g/dL HCT 25.1 (L) 35.8 - 46.3 % PTT Collection Time: 06/10/19  3:06 PM  
Result Value Ref Range aPTT 36.2 24.7 - 39.8 SEC  
GLUCOSE, POC Collection Time: 06/10/19  4:04 PM  
Result Value Ref Range Glucose (POC) 133 (H) 65 - 100 mg/dL HGB & HCT Collection Time: 06/10/19 11:53 PM  
Result Value Ref Range HGB 8.9 (L) 11.7 - 15.4 g/dL HCT 29.2 (L) 35.8 - 46.3 % PTT Collection Time: 06/10/19 11:53 PM  
Result Value Ref Range aPTT 49.9 (H) 24.7 - 39.8 SEC  
CBC WITH AUTOMATED DIFF Collection Time: 06/11/19  5:00 AM  
Result Value Ref Range WBC 7.6 4.3 - 11.1 K/uL  
 RBC 3.13 (L) 4.05 - 5.2 M/uL HGB 8.8 (L) 11.7 - 15.4 g/dL HCT 28.5 (L) 35.8 - 46.3 % MCV 91.1 79.6 - 97.8 FL  
 MCH 28.1 26.1 - 32.9 PG  
 MCHC 30.9 (L) 31.4 - 35.0 g/dL  
 RDW 16.1 (H) 11.9 - 14.6 % PLATELET 097 (L) 403 - 450 K/uL MPV 12.5 (H) 9.4 - 12.3 FL ABSOLUTE NRBC 0.04 0.0 - 0.2 K/uL  
 DF AUTOMATED NEUTROPHILS 69 43 - 78 % LYMPHOCYTES 18 13 - 44 % MONOCYTES 9 4.0 - 12.0 % EOSINOPHILS 3 0.5 - 7.8 % BASOPHILS 1 0.0 - 2.0 % IMMATURE GRANULOCYTES 1 0.0 - 5.0 %  
 ABS. NEUTROPHILS 5.3 1.7 - 8.2 K/UL  
 ABS. LYMPHOCYTES 1.3 0.5 - 4.6 K/UL  
 ABS. MONOCYTES 0.7 0.1 - 1.3 K/UL  
 ABS. EOSINOPHILS 0.2 0.0 - 0.8 K/UL  
 ABS. BASOPHILS 0.0 0.0 - 0.2 K/UL  
 ABS. IMM. GRANS. 0.1 0.0 - 0.5 K/UL METABOLIC PANEL, BASIC Collection Time: 06/11/19  5:00 AM  
Result Value Ref Range Sodium 140 136 - 145 mmol/L Potassium 5.1 3.5 - 5.1 mmol/L Chloride 106 98 - 107 mmol/L  
 CO2 29 21 - 32 mmol/L Anion gap 5 (L) 7 - 16 mmol/L Glucose 89 65 - 100 mg/dL BUN 54 (H) 8 - 23 MG/DL Creatinine 2.29 (H) 0.6 - 1.0 MG/DL  
 GFR est AA 27 (L) >60 ml/min/1.73m2 GFR est non-AA 22 (L) >60 ml/min/1.73m2 Calcium 9.8 8.3 - 10.4 MG/DL  
GLUCOSE, POC Collection Time: 06/11/19  7:29 AM  
Result Value Ref Range Glucose (POC) 108 (H) 65 - 100 mg/dL GLUCOSE, POC Collection Time: 06/11/19 11:15 AM  
Result Value Ref Range Glucose (POC) 110 (H) 65 - 100 mg/dL Current Facility-Administered Medications:  
  0.9% sodium chloride infusion 250 mL, 250 mL, IntraVENous, PRN, Tanya Monroe, DO 
  furosemide (LASIX) tablet 40 mg, 40 mg, Oral, DAILY, Tanya Monroe, DO, 40 mg at 06/11/19 4362   polyethylene glycol (MIRALAX) packet 17 g, 17 g, Oral, BID, Shanice Shelley MD, Stopped at 06/11/19 0900   patiromer calcium sorbitex (VELTASSA) powder 8.4 g, 8.4 g, Oral, DAILY, Romy Uribe MD, Stopped at 06/11/19 0900   ferrous gluconate 324 mg (38 mg iron) tablet 1 Tab, 1 Tab, Oral, BID WITH MEALS, Jenni Monroe DO, 1 Tab at 06/11/19 0216   heparin 25,000 units in dextrose 500 mL infusion, 12-25 Units/kg/hr, IntraVENous, TITRATE, Tanya Monroe DO, Stopped at 06/11/19 0600   pantoprazole (PROTONIX) 40 mg in sodium chloride 0.9% 10 mL injection, 40 mg, IntraVENous, Q12H, Tanya Monroe DO, 40 mg at 06/11/19 9244   albuterol (PROVENTIL VENTOLIN) nebulizer solution 2.5 mg, 2.5 mg, Nebulization, Q4H PRN, Teto, Belinda, NP 
  carvedilol (COREG) tablet 6.25 mg, 6.25 mg, Oral, BID WITH MEALS, Balotisit, Bernardah, NP, 6.25 mg at 06/11/19 0018   sertraline (ZOLOFT) tablet 50 mg, 50 mg, Oral, DAILY, Balingit, Bernardah, NP, 50 mg at 06/11/19 4688   simvastatin (ZOCOR) tablet 20 mg, 20 mg, Oral, QHS, Balingit, kah, NP, 20 mg at 06/10/19 2136   traZODone (DESYREL) tablet 50 mg, 50 mg, Oral, QHS, Balingit, kah, NP, 50 mg at 06/10/19 2136   vitamin E (AQUA GEMS) capsule 400 Units (Patient Supplied), 400 Units, Oral, DAILY, Naomyit, Bernardah, NP 
  sodium chloride (NS) flush 5-40 mL, 5-40 mL, IntraVENous, Q8H, NaomyitBernardah, NP, 10 mL at 06/11/19 0505   sodium chloride (NS) flush 5-40 mL, 5-40 mL, IntraVENous, PRN, Naomyit, Bernardah, NP 
  acetaminophen (TYLENOL) tablet 650 mg, 650 mg, Oral, Q4H PRN, Naomyit, Bernardah, NP 
  HYDROcodone-acetaminophen (NORCO) 5-325 mg per tablet 1 Tab, 1 Tab, Oral, Q4H PRN, Belinda Irene, NP 
  naloxone (NARCAN) injection 0.4 mg, 0.4 mg, IntraVENous, PRN, Naomyit, kah, NP 
  ondansetron (ZOFRAN) injection 4 mg, 4 mg, IntraVENous, Q4H PRN, Naomyit, Bernardah, NP 
  bisacodyl (DULCOLAX) tablet 10 mg, 10 mg, Oral, DAILY PRN, Balingit, Revkah, NP 
  0.9% sodium chloride infusion 250 mL, 250 mL, IntraVENous, PRN, Belinda Irene NP 
  budesonide (PULMICORT) 500 mcg/2 ml nebulizer suspension, 500 mcg, Nebulization, BID RT, 500 mcg at 06/11/19 0818 **AND** albuterol (PROVENTIL VENTOLIN) nebulizer solution 2.5 mg, 2.5 mg, Nebulization, Q6HWA RT, Belinda Irene NP, 2.5 mg at 06/11/19 3771   diphenhydrAMINE (BENADRYL) capsule 25 mg, 25 mg, Oral, Q4H PRN, Belinda Irene NP Assessment:  
 
Principal Problem: Symptomatic anemia (6/6/2019) Active Problems: 
  MDS (myelodysplastic syndrome) ([facility] (12/17/2011) Overview: Procrit started in August, 2011 12/18/11 Procrit weekly and Iron stores. 5-12 12-13-12 good response to 3 weekly procrit did not need it last time 3-7-13 Pt doing well. Just wanted a \"check-up. \" Responding to Procrit  
    every three weeks. 8-29-13 patient has missed some injections on recently restarted  
    hemoglobin only issue , takes oral iron iron stores each time Essential hypertension, benign (1/20/2013) CKD (chronic kidney disease) stage 4, GFR 15-29 ml/min ([facility]) (7/10/2013) COPD (chronic obstructive pulmonary disease) ([facility] (4/2/2014) Overview: HOME OXYGEN 3 LITERS 
 
  HLD (hyperlipidemia) () 
 
  S/P AVR (aortic valve replacement) () Overview: Mechanical/Artific Type 2 diabetes mellitus with nephropathy ([facility] [address] (12/18/2017) Systolic CHF, acute on chronic ([address] (12/31/2018) Hyperkalemia (6/6/2019) Right shoulder pain (6/6/2019) Plan:  
  
Acute on chronic anemia From acute blood loss and chronic illness. Procrit is given on 6/7. Monitor. GI bleeding Received PRC transfusion. For EGD and colonoscopy today. Mechanical AVR On heparin IV drip now. When bleeding stop and patient is more stable, will change to [address] [place] [address]. MDS Monitoring. Hyperkalemia Started on [place]. K is improved. Monitor. Hypernatremia Improved. History of systolic CHF Continue current medications. DVT prophylaxis : heparin IV drip already I have discussed the plan of care with patient. Signed By: Jorge Luis Chowdhury MD   
 June 11, 2019

## 2019-06-11 NOTE — PROCEDURES
Endoscopic Gastroduodenoscopy and Colonoscopy Procedure Note Anesthesia/Sedation: MAC IV Procedure Details: 
Informed consent was obtained for the procedure, including sedation risk. Risks of pancreatitis, infection, perforation, hemorrhage, adverse drug reaction and aspiration were discussed. The EGD gastroscope was inserted into the mouth and advanced under direct vision to the second portion of the duodenum. A careful inspection was made as the gastroscope was withdrawn, including a retroflexed view of the proximal stomach; findings and interventions are described below. The patient was placed in the left lateral decubitus position. A rectal examination was performed. The adult colonoscope was inserted into the rectum and advanced under direct vision to the cecum. The quality of the colonic preparation was good. A careful inspection was made as the colonoscope was withdrawn, including a retroflexed view of the rectum; findings and interventions are described below. Findings:  
 
 
EGD: 
 
ESOPHAGUS: Unremarkable STOMACH: Nodular appearing gastric mucosa throughout the antrum and body. Otherwise unremarkable on forward and retroflexed views. Biopsies taken. DUODENUM:Ulcerated abnormal mucoal fold noted. This is concerning for malignacy. Mutilpe biopsies taken. Tissue very friable. Otherwise unremarkable. Colonoscopy: 
Anus: Unremarkable Rectum: Internal hemorrhoids, otherwise unremarkable Sigmoid: ~5mm semipedunculated polyp removed with cold snare, resection and retrieval were complete. Mild diverticulosis. Otherwise nremarkable Descending Colon:  Unremarkable Transverse Colon: Unremarkable Ascending Colon: Unremarkable Cecum: Unremarkable Terminal ileum: Not examined Specimens: 1. Duodenal mass 2. Gastritis 3. Sigmoid polyp Estimated Blood Loss: 0-min cc Complications:   None; patient tolerated the procedure well. Attending Attestation:  I performed the procedure. Impression: 1. Duodenal mass with ulceration, likely source of patients anemia, heme positive stool - biopsied 2, Nodular gastritis 3. IH 
4. Sigmoid diverticulosis 5. Sigmoid polyp Recommendations: 
1. Follow up pathology 2. Hold anticoagulation for 24 hours if possible 3. Repeat c-scope in 3-5 years 4. Fiber supplementation Мария Silva MD 
[facility], [place]

## 2019-06-11 NOTE — PROGRESS NOTES
TRANSFER - IN REPORT: 
 
Verbal report received from [place], [address] [facility] on [employer]  being received from GI Lab for routine progression of care Report consisted of patients Situation, Background, Assessment and  
Recommendations(SBAR). Information from the following report(s) SBAR, Kardex and Procedure Summary was reviewed with the receiving nurse. Opportunity for questions and clarification was provided. Assessment completed upon patients arrival to unit and care assumed.

## 2019-06-11 NOTE — PROGRESS NOTES
TRANSFER - IN REPORT: 
 
Verbal report received from Deirdre(name) on [employer]  being received from [facility](unit) for routine progression of care Report consisted of patients Situation, Background, Assessment and  
Recommendations(SBAR). Information from the following report(s) SBAR was reviewed with the receiving nurse. Opportunity for questions and clarification was provided. Assessment completed upon patients arrival to unit and care assumed.

## 2019-06-11 NOTE — PROGRESS NOTES
Heparin gtt to be held for 24 hours post EGD/colonoscopy. Entered MAR note for heparin gtt to be restarted at 1600 on Wednesday 6/12/19.

## 2019-06-11 NOTE — PROGRESS NOTES
Pt arrives to GI Lab from room #637 for EGD/Colon procedure. PIV intact; no blood return but flushes easily. NAD noted.

## 2019-06-12 NOTE — PROGRESS NOTES
[facility] Progress Note Admit Date:  6/6/2019 Today's Date:  6/12/2019 CC: GI bleeding Subjective:  
 
Patient: Small amount of dark blood last night. Denies any abdominal pain, N&V. Dysphagia improved. Still with dyspnea. Endoscopic Gastroduodenoscopy and Colonoscopy Procedure Note 
  
Anesthesia/Sedation: MAC IV Procedure Details: 
Informed consent was obtained for the procedure, including sedation risk. Risks of pancreatitis, infection, perforation, hemorrhage, adverse drug reaction and aspiration were discussed. The EGD gastroscope was inserted into the mouth and advanced under direct vision to the second portion of the duodenum. A careful inspection was made as the gastroscope was withdrawn, including a retroflexed view of the proximal stomach; findings and interventions are described below.  
  
The patient was placed in the left lateral decubitus position. A rectal examination was performed. The adult colonoscope was inserted into the rectum and advanced under direct vision to the cecum. The quality of the colonic preparation was good. A careful inspection was made as the colonoscope was withdrawn, including a retroflexed view of the rectum; findings and interventions are described below.   
  
Findings:  
  
  
EGD: 
  
ESOPHAGUS: Unremarkable 
  
STOMACH: Nodular appearing gastric mucosa throughout the antrum and body. Otherwise unremarkable on forward and retroflexed views. Biopsies taken. 
  
DUODENUM:Ulcerated abnormal mucoal fold noted. This is concerning for malignacy. Mutilpe biopsies taken. Tissue very friable. Otherwise unremarkable. 
  
  
Colonoscopy: 
Anus: Unremarkable Rectum: Internal hemorrhoids, otherwise unremarkable Sigmoid: ~5mm semipedunculated polyp removed with cold snare, resection and retrieval were complete. Mild diverticulosis. Otherwise nremarkable Descending Colon:  Unremarkable Transverse Colon: Unremarkable Ascending Colon: Unremarkable Cecum: Unremarkable Terminal ileum: Not examined 
  
Specimens: 1. Duodenal mass 2. Gastritis 3. Sigmoid polyp 
  
Estimated Blood Loss: 0-min cc Complications:   None; patient tolerated the procedure well. Attending Attestation:  I performed the procedure.  
  
Impression: 1. Duodenal mass with ulceration, likely source of patients anemia, heme positive stool - biopsied 2, Nodular gastritis 3. IH 
4. Sigmoid diverticulosis 5. Sigmoid polyp 
  
  
Recommendations: 
1. Follow up pathology 2. Hold anticoagulation for 24 hours if possible 3. Repeat c-scope in 3-5 years 4. Fiber supplementation  
  
  
  
Siena Lazo MD 
[facility], [place] 
  
 
 
 
 
Medications:  
Current Facility-Administered Medications Medication Dose Route Frequency  lidocaine (XYLOCAINE) 10 mg/mL (1 %) injection 0.1 mL  0.1 mL SubCUTAneous PRN  
 lactated Ringers infusion  100 mL/hr IntraVENous CONTINUOUS  
 midazolam (VERSED) injection 2 mg  2 mg IntraVENous ONCE PRN  
 0.9% sodium chloride infusion 250 mL  250 mL IntraVENous PRN  
 furosemide (LASIX) tablet 40 mg  40 mg Oral DAILY  polyethylene glycol (MIRALAX) packet 17 g  17 g Oral BID  patiromer calcium sorbitex (VELTASSA) powder 8.4 g  8.4 g Oral DAILY  ferrous gluconate 324 mg (38 mg iron) tablet 1 Tab  1 Tab Oral BID WITH MEALS  
 heparin 25,000 units in dextrose 500 mL infusion  12-25 Units/kg/hr IntraVENous TITRATE  pantoprazole (PROTONIX) 40 mg in sodium chloride 0.9% 10 mL injection  40 mg IntraVENous Q12H  
 albuterol (PROVENTIL VENTOLIN) nebulizer solution 2.5 mg  2.5 mg Nebulization Q4H PRN  
 carvedilol (COREG) tablet 6.25 mg  6.25 mg Oral BID WITH MEALS  sertraline (ZOLOFT) tablet 50 mg  50 mg Oral DAILY  simvastatin (ZOCOR) tablet 20 mg  20 mg Oral QHS  traZODone (DESYREL) tablet 50 mg  50 mg Oral QHS  vitamin E (AQUA GEMS) capsule 400 Units (Patient Supplied)  400 Units Oral DAILY  sodium chloride (NS) flush 5-40 mL  5-40 mL IntraVENous Q8H  
 sodium chloride (NS) flush 5-40 mL  5-40 mL IntraVENous PRN  
 acetaminophen (TYLENOL) tablet 650 mg  650 mg Oral Q4H PRN  
 HYDROcodone-acetaminophen (NORCO) 5-325 mg per tablet 1 Tab  1 Tab Oral Q4H PRN  
 naloxone (NARCAN) injection 0.4 mg  0.4 mg IntraVENous PRN  
 ondansetron (ZOFRAN) injection 4 mg  4 mg IntraVENous Q4H PRN  
 bisacodyl (DULCOLAX) tablet 10 mg  10 mg Oral DAILY PRN  
 0.9% sodium chloride infusion 250 mL  250 mL IntraVENous PRN  
 budesonide (PULMICORT) 500 mcg/2 ml nebulizer suspension  500 mcg Nebulization BID RT And  
 albuterol (PROVENTIL VENTOLIN) nebulizer solution 2.5 mg  2.5 mg Nebulization Q6HWA RT  
 diphenhydrAMINE (BENADRYL) capsule 25 mg  25 mg Oral Q4H PRN Review of Systems: ROS was obtained, with pertinent positives as listed above. No chest pain or SOB. Diet:  Cardiac regular diet Objective:  
Vitals: 
Visit Vitals BP 98/63 (BP 1 Location: Left arm, BP Patient Position: At rest) Pulse 82 Temp 97.5 °F (36.4 °C) Resp 20 Ht 5' 2\" (1.575 m) Wt 98.2 kg (216 lb 9.6 oz) SpO2 93% BMI 39.62 kg/m² Intake/Output: 
No intake/output data recorded. 06/10 1901 - 06/12 0700 In: 2082.2 [P.O.:720; I.V.:1362.2] Out: 0 Exam: 
General appearance: alert, cooperative, no distress SITTING UP IN CHAIR ON O2 NC Lungs: clear to auscultation bilaterally anteriorly Heart: regular rate and rhythm Abdomen: soft, non-tender. Bowel sounds normal. No masses, no organomegaly Extremities: extremities normal, atraumatic, no cyanosis or edema Neuro:  alert and oriented Data Review (Labs):   
Recent Labs  
  06/12/19 
0546 06/11/19 
2130 06/11/19 
1244 06/11/19 
0500 06/10/19 
2353 06/10/19 
1506 06/10/19 
1238 06/10/19 
0919 06/10/19 
0202 06/09/19 
1900 06/09/19 
1257 WBC 8.5  --   --  7.6  --   --   --   --  7.1  --   --   
HGB 8.6* 8.3* 8.6* 8.8* 8.9*  --  7.7*  --  7.3* 7.9* 8.1* HCT 28.4* 27.3* 27.8* 28.5* 29.2*  --  25.1*  --  23.7* 26.2* 26.6*  
*  --   --  132*  --   --   --   --  109*  --   -- MCV 92.2  --   --  91.1  --   --   --   --  91.5  --   --   
  --   --  140  --   --   --   --  142  --   --   
K 4.8  --   --  5.1  --   --   --   --  5.7*  --   --   
  --   --  106  --   --   --   --  109*  --   --   
CO2 30  --   --  29  --   --   --   --  30  --   --   
BUN 47*  --   --  54*  --   --   --   --  65*  --   --   
CREA 2.30*  --   --  2.29*  --   --   --   --  2.28*  --   --   
CA 9.9  --   --  9.8  --   --   --   --  9.5  --   --   
*  --   --  89  --   --   --   --  111*  --   --   
PTP  --   --   --   --   --   --   --   --  19.8*  --   --   
INR  --   --   --   --   --   --   --   --  1.7  --   --   
APTT  --   --   --   --  49.9* 36.2  --  169.2* 55.2* 147.1* 60.0* Assessment:  
 
Principal Problem: 
  Symptomatic anemia (6/6/2019) Active Problems: 
  MDS (myelodysplastic syndrome) ([facility] (12/17/2011) Overview: Procrit started in August, 2011 12/18/11 Procrit weekly and Iron stores. 5-12  
     
     
     
    12-13-12 good response to 3 weekly procrit did not need it last time 3-7-13 Pt doing well. Just wanted a \"check-up. \" Responding to Procrit  
    every three weeks. 8-29-13 patient has missed some injections on recently restarted  
    hemoglobin only issue , takes oral iron iron stores each time Essential hypertension, benign (1/20/2013) CKD (chronic kidney disease) stage 4, GFR 15-29 ml/min ([facility]) (7/10/2013) COPD (chronic obstructive pulmonary disease) ([facility] (4/2/2014) Overview: HOME OXYGEN 3 LITERS 
 
  HLD (hyperlipidemia) () 
 
  S/P AVR (aortic valve replacement) () Overview: Mechanical/Artific Type 2 diabetes mellitus with nephropathy ([facility] (12/18/2017) Systolic CHF, acute on chronic ([facility] (12/31/2018) Hyperkalemia (6/6/2019) Right shoulder pain (6/6/2019) 68 y.o. female with multiple medical problems to include myelodysplastic syndrome w anemia, CKD, CAD w cardiac arrest 5-2014 w LHC and PCI to LAD, cardiomyopathy s/p mech AVR (on coumadin) w echo  w EF 25% w severe diffuse hypokinesis (worse apical and septal) w mech AVR and mild MR- s/p [employer] ICD, who we are following for GIB. She underwent EGD and colonoscopy on 6/11/19 by Dr. James Larios which revealed an ulcerated duodenal mass likely the source of her bleeding. Pathology pending. Colonoscopy revealed a sigmoid polyp. Hgb today stable at 8.6 6/12 (8.3). Plan: 1. Await results of pathology 2. Continue Pantoprazole IV 40mg q 12 hours 3. Serial H&H and transfuse as needed 4. Continue to hold heparin for full 24 hours Jeison Munoz. Cassius Heads in collaboration with Dr. Tasha Reynolds 
[facility] [place]

## 2019-06-12 NOTE — PROGRESS NOTES
Call received from patient's daughter Lawrence Silvestre stating they would like referral sent to [facility]. Referral sent. Awaiting response.

## 2019-06-12 NOTE — PROGRESS NOTES
Pt A&O x4, sat in chair beside bed a few times during shift, rested off and on, good appetite, hourly shifts completed, no distress noted

## 2019-06-12 NOTE — PROGRESS NOTES
Problem: Falls - Risk of 
Goal: *Absence of Falls Description Document Mike Howard Fall Risk and appropriate interventions in the flowsheet. Outcome: Progressing Towards Goal 
  
Problem: Patient Education: Go to Patient Education Activity Goal: Patient/Family Education Outcome: Progressing Towards Goal 
  
Problem: Breathing Pattern - Ineffective Goal: *Absence of hypoxia Outcome: Progressing Towards Goal 
Goal: *Use of effective breathing techniques Outcome: Progressing Towards Goal 
Goal: *PALLIATIVE CARE:  Alleviation of Dyspnea Outcome: Progressing Towards Goal 
  
Problem: Patient Education: Go to Patient Education Activity Goal: Patient/Family Education Outcome: Progressing Towards Goal 
  
Problem: Patient Education: Go to Patient Education Activity Goal: Patient/Family Education Outcome: Progressing Towards Goal

## 2019-06-12 NOTE — PROGRESS NOTES
Interdisciplinary Rounds completed 006/12/19. Nursing, Case Management, Physician and PT present. Plan of care reviewed and updated. Restart on Coumadin. Rehab at discharge.

## 2019-06-12 NOTE — PROGRESS NOTES
OT note: 
Pt observed in bed with labored breathing even at rest. Spoke with RN who stated pt does have volume overload. Will hold treatment due to above. Sena Shah

## 2019-06-12 NOTE — PROGRESS NOTES
Progress Note Patient: Donovan Raymond MRN: [id number]  SSN: [id number] YOB: 1950  Age: 68 y.o. Sex: female Admit Date: 6/6/2019 LOS: 6 days Subjective:  
 
PMH significant for myelodisplastic syndrome, DM2, CKD stage 4, HTN, sHF, HLD, COPD, AVR on chronic coumadin. Admitted due to fatigue and found to be anemia more than her baseline and FELIZ. positive fecal occult blood. Patient is for EGD and colonoscopy on 6-. She was found to have duodenal mass with ulceration, that was biopsied. Also with nodular gastritis, internal hemorrhoids, signoid diverticulitis and polyp. Patient is feeling OK now. Eating well this morning. Objective:  
 
Vitals:  
 06/12/19 4324 06/12/19 1663 06/12/19 5587 06/12/19 0932 BP:  98/63  113/60 Pulse:  82  87 Resp:  20  20 Temp:  97.5 °F (36.4 °C)  98.3 °F (36.8 °C) SpO2: 96% 92% 93% 96% Weight:  98.2 kg (216 lb 9.6 oz) Height:      
  
 
Intake and Output: 
Current Shift: 06/12 0701 - 06/12 1900 In: 240 [P.O.:240] Out: - Last three shifts: 06/10 1901 - 06/12 0700 In: 2082.2 [P.O.:720; I.V.:1362.2] Out: 0 Physical Exam:  
 
General:                    The patient is a pleasant elderly female in no acute distress. She is sitting up in her chair. Head:                                   Normocephalic/atraumatic. Eyes:                                   palpebral pallor, no scleral icterus. ENT:                                    External auricular and nasal exam within normal limits. Mucous membranes are moist. 
Neck:                                   Supple, non-tender, no JVD. Lungs:                       Clear to auscultation bilaterally without wheezes or crackles. No respiratory distress or accessory muscle use.  
Heart:                                  Regular rate and rhythm, without murmurs, rubs, or gallops. Abdomen:                  Soft, non-tender, non-distended with normoactive bowel sounds. Genitourinary:           No tenderness over the bladder or bilateral CVAs. Extremities:               Without clubbing, cyanosis, or edema. Skin:                                    Normal color, texture, and turgor. No rashes, lesions, or jaundice. Pulses:                      Radial and dorsalis pedis pulses present 2+ bilaterally. Capillary refill <2s. Neurologic:                CN II-XII grossly intact and symmetrical.  
                                            Moving all four extremities well with no focal deficits. Psychiatric:                Pleasant demeanor, appropriate affect. Alert and oriented x 3 Lab/Data Review: 
 
Recent Results (from the past 24 hour(s)) HGB & HCT Collection Time: 06/11/19 12:44 PM  
Result Value Ref Range HGB 8.6 (L) 11.7 - 15.4 g/dL HCT 27.8 (L) 35.8 - 46.3 % GLUCOSE, POC Collection Time: 06/11/19  4:07 PM  
Result Value Ref Range Glucose (POC) 138 (H) 65 - 100 mg/dL HGB & HCT Collection Time: 06/11/19  9:30 PM  
Result Value Ref Range HGB 8.3 (L) 11.7 - 15.4 g/dL HCT 27.3 (L) 35.8 - 46.3 % CBC WITH AUTOMATED DIFF Collection Time: 06/12/19  5:46 AM  
Result Value Ref Range WBC 8.5 4.3 - 11.1 K/uL  
 RBC 3.08 (L) 4.05 - 5.2 M/uL HGB 8.6 (L) 11.7 - 15.4 g/dL HCT 28.4 (L) 35.8 - 46.3 % MCV 92.2 79.6 - 97.8 FL  
 MCH 27.9 26.1 - 32.9 PG  
 MCHC 30.3 (L) 31.4 - 35.0 g/dL  
 RDW 15.9 (H) 11.9 - 14.6 % PLATELET 350 (L) 580 - 450 K/uL MPV 11.5 9.4 - 12.3 FL ABSOLUTE NRBC 0.03 0.0 - 0.2 K/uL  
 DF AUTOMATED NEUTROPHILS 74 43 - 78 % LYMPHOCYTES 15 13 - 44 % MONOCYTES 8 4.0 - 12.0 % EOSINOPHILS 2 0.5 - 7.8 % BASOPHILS 0 0.0 - 2.0 % IMMATURE GRANULOCYTES 1 0.0 - 5.0 %  
 ABS. NEUTROPHILS 6.3 1.7 - 8.2 K/UL ABS. LYMPHOCYTES 1.3 0.5 - 4.6 K/UL  
 ABS. MONOCYTES 0.7 0.1 - 1.3 K/UL  
 ABS. EOSINOPHILS 0.2 0.0 - 0.8 K/UL  
 ABS. BASOPHILS 0.0 0.0 - 0.2 K/UL  
 ABS. IMM. GRANS. 0.0 0.0 - 0.5 K/UL METABOLIC PANEL, BASIC Collection Time: 06/12/19  5:46 AM  
Result Value Ref Range Sodium 141 136 - 145 mmol/L Potassium 4.8 3.5 - 5.1 mmol/L Chloride 106 98 - 107 mmol/L  
 CO2 30 21 - 32 mmol/L Anion gap 5 (L) 7 - 16 mmol/L Glucose 107 (H) 65 - 100 mg/dL BUN 47 (H) 8 - 23 MG/DL Creatinine 2.30 (H) 0.6 - 1.0 MG/DL  
 GFR est AA 27 (L) >60 ml/min/1.73m2 GFR est non-AA 22 (L) >60 ml/min/1.73m2 Calcium 9.9 8.3 - 10.4 MG/DL  
GLUCOSE, POC Collection Time: 06/12/19  7:23 AM  
Result Value Ref Range Glucose (POC) 114 (H) 65 - 100 mg/dL Current Facility-Administered Medications:  
  lidocaine (XYLOCAINE) 10 mg/mL (1 %) injection 0.1 mL, 0.1 mL, SubCUTAneous, PRN, Tiffany Flores MD 
  midazolam (VERSED) injection 2 mg, 2 mg, IntraVENous, ONCE PRN, Tiffany Flores MD 
  0.9% sodium chloride infusion 250 mL, 250 mL, IntraVENous, PRN, Tanya Monroe, DO 
  furosemide (LASIX) tablet 40 mg, 40 mg, Oral, DAILY, Tanya Monroe, DO, 40 mg at 06/12/19 6630   polyethylene glycol (MIRALAX) packet 17 g, 17 g, Oral, BID, Shanice Shelley MD, 17 g at 06/12/19 2827   patiromer calcium sorbitex (VELTASSA) powder 8.4 g, 8.4 g, Oral, DAILY, Klimas, Gwyneth Kehr, MD, Stopped at 06/11/19 0900   ferrous gluconate 324 mg (38 mg iron) tablet 1 Tab, 1 Tab, Oral, BID WITH MEALS, Marissa Monroe, , 1 Tab at 06/12/19 5658   heparin 25,000 units in dextrose 500 mL infusion, 12-25 Units/kg/hr, IntraVENous, TITRATE, Tanya Monroe DO, Stopped at 06/11/19 0600   pantoprazole (PROTONIX) 40 mg in sodium chloride 0.9% 10 mL injection, 40 mg, IntraVENous, Q12H, Tanya Monroe DO, 40 mg at 06/12/19 4904   albuterol (PROVENTIL VENTOLIN) nebulizer solution 2.5 mg, 2.5 mg, Nebulization, Q4H PRN, Chrisingit, kah, NP 
  carvedilol (COREG) tablet 6.25 mg, 6.25 mg, Oral, BID WITH MEALS, Balingit, Revkah, NP, 6.25 mg at 06/12/19 2240   sertraline (ZOLOFT) tablet 50 mg, 50 mg, Oral, DAILY, Balingit, Revkah, NP, 50 mg at 06/12/19 1185   simvastatin (ZOCOR) tablet 20 mg, 20 mg, Oral, QHS, Balingit, Revkah, NP, 20 mg at 06/11/19 2200 
  traZODone (DESYREL) tablet 50 mg, 50 mg, Oral, QHS, Balingit, Revkah, NP, 50 mg at 06/11/19 2200 
  vitamin E (AQUA GEMS) capsule 400 Units (Patient Supplied), 400 Units, Oral, DAILY, Balingit, kah, NP 
  sodium chloride (NS) flush 5-40 mL, 5-40 mL, IntraVENous, Q8H, Balingit, kah, NP, 10 mL at 06/12/19 0501 
  sodium chloride (NS) flush 5-40 mL, 5-40 mL, IntraVENous, PRN, Chrisingit, kah, NP 
  acetaminophen (TYLENOL) tablet 650 mg, 650 mg, Oral, Q4H PRN, Balingit, Revkah, NP 
  HYDROcodone-acetaminophen (NORCO) 5-325 mg per tablet 1 Tab, 1 Tab, Oral, Q4H PRN, Naomyit, kah, NP 
  naloxone (NARCAN) injection 0.4 mg, 0.4 mg, IntraVENous, PRN, Chrisingit, kah, NP 
  ondansetron (ZOFRAN) injection 4 mg, 4 mg, IntraVENous, Q4H PRN, Chrisingit, kah, NP 
  bisacodyl (DULCOLAX) tablet 10 mg, 10 mg, Oral, DAILY PRN, Chrisingit, kah, NP 
  0.9% sodium chloride infusion 250 mL, 250 mL, IntraVENous, PRN, Chrisingit, kah, NP 
  budesonide (PULMICORT) 500 mcg/2 ml nebulizer suspension, 500 mcg, Nebulization, BID RT, 500 mcg at 06/12/19 0731 **AND** albuterol (PROVENTIL VENTOLIN) nebulizer solution 2.5 mg, 2.5 mg, Nebulization, Q6HWA RT, Belinda Irene NP, 2.5 mg at 06/12/19 6399   diphenhydrAMINE (BENADRYL) capsule 25 mg, 25 mg, Oral, Q4H PRN, Belinda Irene NP Assessment:  
 
Principal Problem: 
  Symptomatic anemia (6/6/2019) Active Problems: 
  MDS (myelodysplastic syndrome) ([facility] (12/17/2011) Overview: Procrit started in August, 2011 12/18/11 Procrit weekly and Iron stores. 5-12 12-13-12 good response to 3 weekly procrit did not need it last time 3-7-13 Pt doing well. Just wanted a \"check-up. \" Responding to Procrit  
    every three weeks. 8-29-13 patient has missed some injections on recently restarted  
    hemoglobin only issue , takes oral iron iron stores each time Essential hypertension, benign (1/20/2013) CKD (chronic kidney disease) stage 4, GFR 15-29 ml/min ([facility]) (7/10/2013) COPD (chronic obstructive pulmonary disease) ([address] (4/2/2014) Overview: HOME OXYGEN 3 LITERS 
 
  HLD (hyperlipidemia) () 
 
  S/P AVR (aortic valve replacement) () Overview: Mechanical/Artific Type 2 diabetes mellitus with nephropathy ([facility] (12/18/2017) Systolic CHF, acute on chronic ([facility] (12/31/2018) Hyperkalemia (6/6/2019) Right shoulder pain (6/6/2019) Plan:  
  
Acute on chronic anemia From acute blood loss and chronic illness. Procrit is given on 6/7. Monitor. GI bleeding Received PRC transfusion. EGD and colonoscopy with result above. Pending biopsy result. Mechanical AVR Will need to resume [facility]. MDS Monitoring. Hyperkalemia Started on [place]. K is improved. Monitor. Hypernatremia Improved. History of systolic CHF Continue current medications. DVT prophylaxis : heparin IV drip already I have discussed the plan of care with patient. Signed By: Mukesh Harris MD   
 June 12, 2019

## 2019-06-12 NOTE — PROGRESS NOTES
[facility] PROGRESS NOTE Follow up for: 
 
Subjective:  
Patient seen and examined. Feeling okay. Renal function is stable ROS: 
Gen - no fever, no chills, appetite okay CV - no chest pain, no orthopnea Lung - no shortness of breath, no cough Abd - no tenderness, no nausea, no vomiting Ext - no edema Objective:  
Exam: 
Vitals:  
 06/12/19 0768 06/12/19 9594 06/12/19 8680 06/12/19 0932 BP:  98/63  113/60 Pulse:  82  87 Resp:  20  20 Temp:  97.5 °F (36.4 °C)  98.3 °F (36.8 °C) SpO2: 96% 92% 93% 96% Weight:  98.2 kg (216 lb 9.6 oz) Height:      
 
 
 
Intake/Output Summary (Last 24 hours) at 6/12/2019 1053 Last data filed at 6/12/2019 4753 Gross per 24 hour Intake 830 ml Output 0 ml Net 830 ml  
 
 
Current Facility-Administered Medications Medication Dose Route Frequency  lidocaine (XYLOCAINE) 10 mg/mL (1 %) injection 0.1 mL  0.1 mL SubCUTAneous PRN  
 midazolam (VERSED) injection 2 mg  2 mg IntraVENous ONCE PRN  
 0.9% sodium chloride infusion 250 mL  250 mL IntraVENous PRN  
 furosemide (LASIX) tablet 40 mg  40 mg Oral DAILY  polyethylene glycol (MIRALAX) packet 17 g  17 g Oral BID  patiromer calcium sorbitex (VELTASSA) powder 8.4 g  8.4 g Oral DAILY  ferrous gluconate 324 mg (38 mg iron) tablet 1 Tab  1 Tab Oral BID WITH MEALS  
 heparin 25,000 units in dextrose 500 mL infusion  12-25 Units/kg/hr IntraVENous TITRATE  pantoprazole (PROTONIX) 40 mg in sodium chloride 0.9% 10 mL injection  40 mg IntraVENous Q12H  
 albuterol (PROVENTIL VENTOLIN) nebulizer solution 2.5 mg  2.5 mg Nebulization Q4H PRN  
 carvedilol (COREG) tablet 6.25 mg  6.25 mg Oral BID WITH MEALS  sertraline (ZOLOFT) tablet 50 mg  50 mg Oral DAILY  simvastatin (ZOCOR) tablet 20 mg  20 mg Oral QHS  traZODone (DESYREL) tablet 50 mg  50 mg Oral QHS  vitamin E (AQUA GEMS) capsule 400 Units (Patient Supplied)  400 Units Oral DAILY  sodium chloride (NS) flush 5-40 mL  5-40 mL IntraVENous Q8H  
 sodium chloride (NS) flush 5-40 mL  5-40 mL IntraVENous PRN  
 acetaminophen (TYLENOL) tablet 650 mg  650 mg Oral Q4H PRN  
 HYDROcodone-acetaminophen (NORCO) 5-325 mg per tablet 1 Tab  1 Tab Oral Q4H PRN  
 naloxone (NARCAN) injection 0.4 mg  0.4 mg IntraVENous PRN  
 ondansetron (ZOFRAN) injection 4 mg  4 mg IntraVENous Q4H PRN  
 bisacodyl (DULCOLAX) tablet 10 mg  10 mg Oral DAILY PRN  
 0.9% sodium chloride infusion 250 mL  250 mL IntraVENous PRN  
 budesonide (PULMICORT) 500 mcg/2 ml nebulizer suspension  500 mcg Nebulization BID RT And  
 albuterol (PROVENTIL VENTOLIN) nebulizer solution 2.5 mg  2.5 mg Nebulization Q6HWA RT  
 diphenhydrAMINE (BENADRYL) capsule 25 mg  25 mg Oral Q4H PRN  
 
 
EXAM 
GEN - Alert and awake. CV - S1, S2, RRR, no rub, murmur, or gallop Lung - clear to auscultation bilaterally Abd - soft, nontender, BS present Ext - trace edema Recent Labs  
  06/12/19 
0546 06/11/19 
2130 06/11/19 
1244 06/11/19 
0500  06/10/19 
0202 WBC 8.5  --   --  7.6  --  7.1 HGB 8.6* 8.3* 8.6* 8.8*   < > 7.3* HCT 28.4* 27.3* 27.8* 28.5*   < > 23.7*  
*  --   --  132*  --  109*  
 < > = values in this interval not displayed. Recent Labs  
  06/12/19 
0546 06/11/19 
0500 06/10/19 
0202  140 142  
K 4.8 5.1 5.7*  
 106 109* CO2 30 29 30 BUN 47* 54* 65* CREA 2.30* 2.29* 2.28* CA 9.9 9.8 9.5 * 89 111* Assessment and Plan: FELIZ on CKD-  Cr is stable today. Continue to monitor renal function and UOP. Hyperkalemia- May be related heparin gtt. K is better today. On [place]. Monitoring. Anemia- Hb is stable. 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  
 
 
Alie Griffith MD

## 2019-06-12 NOTE — PROGRESS NOTES
Warfarin dosing per pharmacist 
 
Aguial Grullon Heidy Grady is a 68 y.o. female. Height: 5' 2\" (157.5 cm)    Weight: 98.2 kg (216 lb 9.6 oz) Indication: mechanical AVR Goal INR:  2-3 Home dose:  5 mg Mon; 2.5 mg all other days Risk factors or significant drug interactions:  none Other anticoagulants:  Heparin gtt Daily Monitoring Date  INR     Warfarin dose HGB              Notes 6/10  1.7  ---  8.9 
6/11  ---  ---  8.8 
6/12  Pend  5 mg  8.6 Pharmacy is consulted to manage warfarin for Ms. Corral during this admission. She was admitted with an INR of 3.6 and GI bleed. She had an EGD which revealed duodenal mass. Warfarin has been held until now. Will give a one time dose of 5 mg tonight and then start warfarin 2.5 mg qhs. Daily INR. Pharmacy will continue to follow. Please call with any questions. Thank you, Queen Rajani, PharmD Clinical Pharmacist 
[phone]

## 2019-06-12 NOTE — PROGRESS NOTES
[facility] CARDIOLOGY PROGRESS NOTE 
      
 
6/12/2019 12:31 PM 
 
Admit Date: 6/6/2019 Subjective:  
 
 Somnolent this morning CPAP in place Review of Systems Respiratory: Positive for shortness of breath. Cardiovascular: Negative for chest pain. Objective:  
  
Vitals:  
 06/12/19 0002 06/12/19 0024 06/12/19 3740 06/12/19 9708 BP: 102/57  98/63 Pulse: 78  82 Resp: 20  20 Temp: 97.6 °F (36.4 °C)  97.5 °F (36.4 °C) SpO2: 92% 96% 92% 93% Weight:   98.2 kg (216 lb 9.6 oz) Height:      
 
 
 
Physical Exam  
HENT:  
Head: Normocephalic and atraumatic. Eyes: Pupils are equal, round, and reactive to light. Conjunctivae are normal.  
Neck: Normal range of motion. No JVD present. Cardiovascular: Normal rate. Murmur heard. Pulmonary/Chest: Effort normal. She has decreased breath sounds in the right lower field and the left lower field. Abdominal: She exhibits no distension. Neurological: She is alert. Skin: Skin is warm. She is not diaphoretic. Psychiatric: Her affect is blunt. Data Review:  
Recent Labs  
  06/12/19 
0546 06/11/19 
2130  06/11/19 
0500  06/10/19 
0202   --   --  140  --  142  
K 4.8  --   --  5.1  --  5.7*  
BUN 47*  --   --  54*  --  65* CREA 2.30*  --   --  2.29*  --  2.28* *  --   --  89  --  111* WBC 8.5  --   --  7.6  --  7.1 HGB 8.6* 8.3*   < > 8.8*   < > 7.3* HCT 28.4* 27.3*   < > 28.5*   < > 23.7*  
*  --   --  132*  --  109* INR  --   --   --   --   --  1.7  
 < > = values in this interval not displayed. Intake/Output Summary (Last 24 hours) at 6/12/2019 1219 Last data filed at 6/11/2019 1904 Gross per 24 hour Intake 590 ml Output 0 ml Net 590 ml  
 
Current Facility-Administered Medications Medication Dose Route Frequency  furosemide (LASIX) injection 40 mg  40 mg IntraVENous ONCE  
 lidocaine (XYLOCAINE) 10 mg/mL (1 %) injection 0.1 mL  0.1 mL SubCUTAneous PRN  
  midazolam (VERSED) injection 2 mg  2 mg IntraVENous ONCE PRN  
 0.9% sodium chloride infusion 250 mL  250 mL IntraVENous PRN  
 furosemide (LASIX) tablet 40 mg  40 mg Oral DAILY  polyethylene glycol (MIRALAX) packet 17 g  17 g Oral BID  patiromer calcium sorbitex (VELTASSA) powder 8.4 g  8.4 g Oral DAILY  ferrous gluconate 324 mg (38 mg iron) tablet 1 Tab  1 Tab Oral BID WITH MEALS  
 heparin 25,000 units in dextrose 500 mL infusion  12-25 Units/kg/hr IntraVENous TITRATE  pantoprazole (PROTONIX) 40 mg in sodium chloride 0.9% 10 mL injection  40 mg IntraVENous Q12H  
 albuterol (PROVENTIL VENTOLIN) nebulizer solution 2.5 mg  2.5 mg Nebulization Q4H PRN  
 carvedilol (COREG) tablet 6.25 mg  6.25 mg Oral BID WITH MEALS  sertraline (ZOLOFT) tablet 50 mg  50 mg Oral DAILY  simvastatin (ZOCOR) tablet 20 mg  20 mg Oral QHS  traZODone (DESYREL) tablet 50 mg  50 mg Oral QHS  vitamin E (AQUA GEMS) capsule 400 Units (Patient Supplied)  400 Units Oral DAILY  sodium chloride (NS) flush 5-40 mL  5-40 mL IntraVENous Q8H  
 sodium chloride (NS) flush 5-40 mL  5-40 mL IntraVENous PRN  
 acetaminophen (TYLENOL) tablet 650 mg  650 mg Oral Q4H PRN  
 HYDROcodone-acetaminophen (NORCO) 5-325 mg per tablet 1 Tab  1 Tab Oral Q4H PRN  
 naloxone (NARCAN) injection 0.4 mg  0.4 mg IntraVENous PRN  
 ondansetron (ZOFRAN) injection 4 mg  4 mg IntraVENous Q4H PRN  
 bisacodyl (DULCOLAX) tablet 10 mg  10 mg Oral DAILY PRN  
 0.9% sodium chloride infusion 250 mL  250 mL IntraVENous PRN  
 budesonide (PULMICORT) 500 mcg/2 ml nebulizer suspension  500 mcg Nebulization BID RT And  
 albuterol (PROVENTIL VENTOLIN) nebulizer solution 2.5 mg  2.5 mg Nebulization Q6HWA RT  
 diphenhydrAMINE (BENADRYL) capsule 25 mg  25 mg Oral Q4H PRN Assessment/Plan:  
 
History of chronic kidney disease stage IV COPD and myelodysplastic syndrome with anemia PCI to LAD 2014 status post mechanical aortic valve replacement on chronic warfarin therapy with severe left ventricular systolic dysfunction with EF 25%. Status post Biotronik ICD. Admitted for severe weakness and anemia 1. Symptomatic anemia most likely multifactorial oncology GI  addressing 2. Hypertension hypotensive hold imdur today 3. Cardiomyopathy on carvedilol afterload reducing agents isosorbide and hydralazine held due to hypotension . Not a candidate for ace or arb  therapy due to kidney disease. Patient received IV volume with lactated Ringer's 6/11/2019 she has evidence of volume overload on exam and is short of breath. Stop IV fluids IV Lasix today. Patient receiving oral Lasix 40 mg daily. 4. Mechanical valve in aortic position initiated on heparin drip see guiidlines listed below. Difficult situation due to anemia. 5. FELIZ improving hyperkalemic Continuation of VKA anticoagulation with a therapeutic INR is recommended in patients with mechanical heart valves 
undergoing minor procedures (such as dental extractions or cataract removal) where bleeding is easily controlled. Temporary interruption of VKA anticoagulation, without bridging agents while the INR is subtherapeutic, is recommended in patients with a bileaflet mechanical AVR and no other risk factors for thrombosis who are undergoing invasive or surgical procedures. Bridging therapy with either intravenous unfractionated heparin or low-molecular-weight heparin has evolved empirically to reduce thromboembolic 
events during temporary interruption of oral anticoagulation in higher-risk patients, such as those with a mechanical MVR or AVR and additional risk 
factors for thromboembolism (eg, AF, previous thromboembolism, hypercoagulable condition, older-generation mechanical valves [ball-cage or tilting 
disc], LV systolic dysfunction, or >1 mechanical valve).  
When interruption of oral VKA therapy is deemed necessary, the agent is usually stopped 3 to 4 days before the procedure (so the INR falls to 
<1.5 for major surgical procedures) and is restarted postoperatively as soon as bleeding risk allows, typically 12 to 24 hours after surgery. Bridging 
anticoagulation with intravenous unfractionated heparin or subcutaneous low-molecular-weight heparin is started when the INR falls below the 
therapeutic threshold (ie, 2.0 or 2.5, depending on the clinical context), usually 36 to 48 hours before surgery, and is stopped 4 to 6 hours (for 
intravenous unfractionated heparin) or 12 hours (for subcutaneous low-molecular-weight heparin) before the procedure. There are no randomized comparative-effectiveness trials evaluating a strategy of bridging versus no bridging in adequate numbers of patients 
with prosthetic heart valves needing temporary interruption of oral anticoagulant therapy, although such studies are ongoing. The evidence used to 
support bridging therapy derives from cohort studies with poor or no comparator groups. 2  In patient groups other than those with mechanical 
heart valves, increasing concerns have surfaced that bridging therapy exposes patients to higher bleeding risks without reducing the risk of 
thromboembolism. 199 Accordingly, decisions about bridging should be individualized and should account for the trade-offs between thrombosis and 
bleeding. 2017 AHA/ACC Focused Update of the 
2014 AHA/ACC Guideline for the Management 
of Patients With Valvular Heart Disease Maura Cleary MD 
6/12/2019 12:31 PM

## 2019-06-13 NOTE — PROGRESS NOTES
Problem: Mobility Impaired (Adult and Pediatric) Goal: *Acute Goals and Plan of Care (Insert Text) Description LTG: 
(1.)Ms. Freddy Perrin will move from supine to sit and sit to supine  in bed with MODIFIED INDEPENDENCE within 5 treatment day(s). (2.)Ms. Freddy Perrin will transfer from bed to chair and chair to bed with MODIFIED INDEPENDENCE using the least restrictive device within 5 treatment day(s). (3.)Ms. Freddy Perrin will ambulate with MODIFIED INDEPENDENCE for 50 feet with the least restrictive device within 5 treatment day(s). ________________________________________________________________________________________________ Outcome: Progressing Towards Goal 
 
 
PHYSICAL THERAPY: Daily Note and AM 6/13/2019 INPATIENT: PT Visit Days : 2 [place] / [place] / Product Type: Medicare /   
  
NAME/AGE/GENDER: Shasha Rios is a 68 y.o. female PRIMARY DIAGNOSIS: Symptomatic anemia [D64.9] Symptomatic anemia Symptomatic anemia Procedure(s) (LRB): ESOPHAGOGASTRODUODENOSCOPY (EGD) Hep drip stopped at 0600 (N/A) COLONOSCOPY/ BMI 40 ROOM 637 (N/A) ESOPHAGOGASTRODUODENAL (EGD) BIOPSY (N/A) ENDOSCOPIC POLYPECTOMY (N/A) 2 Days Post-Op ICD-10: Treatment Diagnosis:  
 · Generalized Muscle Weakness (M62.81) · Difficulty in walking, Not elsewhere classified (R26.2) · Repeated Falls (R29.6) Precaution/Allergies: 
Ferrlecit [sodium ferric gluconat-sucrose]; Sulfa (sulfonamide antibiotics); and Aspirin ASSESSMENT:  
 
Ms. Freddy Perrin presents sitting in bedside chair and is agreeable to PT. Patient reports 3 falls in the last 1-2 months. She states she lives independently, is on home O2, has a rollator but doesn't use it. Patient was supine upon contact and agreeable to PT. Patient performs supine to sit with mod assist, additional time, and cues for improved/proper technique.  Patient then transfers to standing with min assist and cues for hand placement. Once standing patient ambulates 25' with rolling walker, CGA and cues for sequencing with rolling walker. Patient returns to recliner chair where she then participates in therapeutic strengthening exercises to improve functional strength for transfers, gait and overall mobility. Patient requires cues to perform exercises correctly. Patient needed rest breaks throughout exercises. Overall slow progress towards physical therapy goals. Patient's goals listed above are still appropriate. Will continue skilled PT to address remaining deficits. This section established at most recent assessment PROBLEM LIST (Impairments causing functional limitations): 1. Decreased Strength 2. Decreased ADL/Functional Activities 3. Decreased Transfer Abilities 4. Decreased Ambulation Ability/Technique 5. Decreased Balance 6. Increased Pain 7. Decreased Activity Tolerance 8. Decreased Grundy with Home Exercise Program 
 INTERVENTIONS PLANNED: (Benefits and precautions of physical therapy have been discussed with the patient.) 1. Balance Exercise 2. Family Education 3. Gait Training 4. Group Therapy 5. Home Exercise Program (HEP) 6. Therapeutic Activites 7. Therapeutic Exercise/Strengthening 8. Transfer Training TREATMENT PLAN: Frequency/Duration: 3 times a week for duration of hospital stay Rehabilitation Potential For Stated Goals: Excellent REHAB RECOMMENDATIONS (at time of discharge pending progress):   
Placement: It is my opinion, based on this patient's performance to date, that Ms. Diaz may benefit from participating in 1-2 additional therapy sessions in order to continue to assess for rehab potential and then make recommendation for disposition at discharge. Equipment: ?  To be determined after AD trial   
    
 
 
 
HISTORY:  
History of Present Injury/Illness (Reason for Referral): 
 Pt is a 67 yo female with PMH significant for myelodisplastic syndrome, DM2, CKD stage 4, HTN, sHF, HLD, COPD, AVR on chronic coumadin. Pt presented to the ED via EMS with report of fatigue. Pt reports being too weak to get up this am.  Pt also states that she has missed her recent iron injections. Pt initially denied any falls but did recall a fall which injured her right arm/shoulder. She denies fever, CP, sob. She denies any n/v/d/c. Pt denies seeing any blood in her stool. On admit it was found that pt hgb 7.1, K+ also 7.1, Cr 3.71, . Stool was FOB positive. CXR negative, EKG LBBB, axis deviation to the left. Rerebekah Locker ordered not yet obtained. Pt getting calcium gluconate, D50 and insulin - will then recheck potassium. 10 systems reviewed and negative except as noted in HPI. Past Medical History/Comorbidities: Ms. Diaz  has a past medical history of Anticoagulated on Coumadin (7/9/2013), CAD (coronary artery disease) (1/20/2013), Cardiomyopathy ([facility] [address] CHF (congestive heart failure) ([facility] [address] (2/17/2017), CKD (chronic kidney disease) stage 3, GFR 30-59 ml/min ([facility]) (7/10/2013), COPD (chronic obstructive pulmonary disease) ([address] (4/2/2014), Diabetes ([facility] [address] Essential hypertension, benign (1/20/2013), HLD (hyperlipidemia), ICD (implantable cardioverter-defibrillator) in place (10/2/2014), Iron deficiency anemia due to chronic blood loss (7/29/2009), MDS (myelodysplastic syndrome) ([address] (12/17/2011), REJI (obstructive sleep apnea)-cpap (4/2/2014), Osteoarthritis, Osteopenia, Pulmonary hypertension ([address] (6/15/2016), Quadrantanopia, Skin defect (11/1/2018), and Visual complaint (12/14/2015). She also has no past medical history of Difficult intubation, Malignant hyperthermia due to anesthesia, Nausea & vomiting, Pseudocholinesterase deficiency, or Unspecified adverse effect of anesthesia.   Ms. Diaz  has a past surgical history that includes cardiac catheterization (); ir bx bone marrow diagnostic (2011); hx aortic valve replacement (, ); hx  section; hx tubal ligation; hx heart catheterization (); hx coronary stent placement (May, 2014); hx pacemaker (10/2/2014); hx endoscopy (2009); and colonoscopy (N/A, 2019). Social History/Living Environment:  
Home Environment: Apartment # Steps to Enter: 2 One/Two Story Residence: One story Living Alone: Yes Support Systems: Child(alysha) Patient Expects to be Discharged to[de-identified] [id number] Current DME Used/Available at Home: Oxygen, portable, Shower chair, Walker, rollator Tub or Shower Type: Tub/Shower combination Prior Level of Function/Work/Activity: 
Modified independent to independent at baseline. Doesn't drive. Has a rollator. Number of Personal Factors/Comorbidities that affect the Plan of Care: 1-2: MODERATE COMPLEXITY EXAMINATION:  
Most Recent Physical Functioning:  
Gross Assessment: 
  
         
  
Posture: 
  
Balance: 
Sitting: Intact Standing: Impaired Standing - Static: Good Standing - Dynamic : Fair Bed Mobility: 
Supine to Sit: Moderate assistance Wheelchair Mobility: 
  
Transfers: 
Sit to Stand: Minimum assistance Stand to Sit: Minimum assistance Gait: 
  
Base of Support: Widened Speed/Winifred: Slow;Shuffled Gait Abnormalities: Decreased step clearance;Shuffling gait;Trunk sway increased; Path deviations Distance (ft): 25 Feet (ft) Ambulation - Level of Assistance: Contact guard assistance Interventions: Safety awareness training; Tactile cues; Verbal cues Body Structures Involved: 1. Bones 2. Joints 3. Muscles 4. Ligaments Body Functions Affected: 1. Neuromusculoskeletal 
2. Movement Related Activities and Participation Affected: 1. Mobility 2. Self Care 3. Domestic Life 4. Interpersonal Interactions and Relationships Number of elements that affect the Plan of Care: 4+: HIGH COMPLEXITY CLINICAL PRESENTATION:  
 Presentation: Stable and uncomplicated: LOW COMPLEXITY CLINICAL DECISION [address] [place] [address] AM-PAC 6 Clicks Basic Mobility Inpatient Short Form How much difficulty does the patient currently have. .. Unable A Lot A Little None 1. Turning over in bed (including adjusting bedclothes, sheets and blankets)? ? 1   ? 2   ? 3   ? 4  
2. Sitting down on and standing up from a chair with arms ( e.g., wheelchair, bedside commode, etc.)   ? 1   ? 2   ? 3   ? 4  
3. Moving from lying on back to sitting on the side of the bed?   ? 1   ? 2   ? 3   ? 4 How much help from another person does the patient currently need. .. Total A Lot A Little None 4. Moving to and from a bed to a chair (including a wheelchair)? ? 1   ? 2   ? 3   ? 4  
5. Need to walk in hospital room? ? 1   ? 2   ? 3   ? 4  
6. Climbing 3-5 steps with a railing? ? 1   ? 2   ? 3   ? 4  
© , Trustees of [address] [place] [address], under license to [employer]. All rights reserved Score:  Initial: 18 Most Recent: X (Date: -- ) Interpretation of Tool:  Represents activities that are increasingly more difficult (i.e. Bed mobility, Transfers, Gait). Medical Necessity:    
· Patient demonstrates excellent ·  rehab potential due to higher previous functional level. Reason for Services/Other Comments: 
· Patient continues to require modification of therapeutic interventions to increase complexity of exercises · . Use of outcome tool(s) and clinical judgement create a POC that gives a: Clear prediction of patient's progress: LOW COMPLEXITY  
  
 
 
 
TREATMENT:  
(In addition to Assessment/Re-Assessment sessions the following treatments were rendered) Pre-treatment Symptoms/Complaints:  No complaints of pain 
Pain: Initial:  
Pain Intensity 1: 0  Post Session:  0/10 Therapeutic Activity: (    15 minutes):   Therapeutic activities including bed mobility training, transfer training, ambulation on level ground, static/dynamic sitting/standing balance training, posture training, instruction in pursed lipped breathing, instruction in sequencing with rolling walker, scooting, and patient eudcation to improve mobility, strength and balance. Required minimal Safety awareness training; Tactile cues; Verbal cues to promote dynamic balance in standing. Therapeutic Exercise: (  10 Minutes):  Exercises per grid below to improve mobility, strength and balance. Required moderate visual, verbal and tactile cues to promote proper body alignment, promote proper body posture and promote proper body mechanics. Progressed range, repetitions and complexity of movement as indicated. Date: 
6/13/19 Date: 
 Date: 
  
ACTIVITY/EXERCISE AM PM AM PM AM PM  
Ambulation:           Distance Device Duration Seated Heel Raises x10B A Seated Toe Raises x10B A Seated Long Arc Quads x10B A Seated Marching x10B A Seated Hip Abduction x10B A       
        
B = bilateral; AA = active assistive; A = active; P = passive Braces/Orthotics/Lines/Etc:  
· IV 
· O2 Device: Nasal cannula Treatment/Session Assessment:   
· Response to Treatment:  See above · Interdisciplinary Collaboration:  
o Registered Nurse · After treatment position/precautions:  
o Up in chair 
o Bed/Chair-wheels locked 
o Bed in low position 
o Call light within reach 
o RN notified 
o Family at bedside · Compliance with Program/Exercises: Will assess as treatment progresses · Recommendations/Intent for next treatment session: \"Next visit will focus on advancements to more challenging activities and reduction in assistance provided\". Total Treatment Duration: PT Patient Time In/Time Out Time In: 0131 Time Out: 1050 John Lazo PTA

## 2019-06-13 NOTE — PROGRESS NOTES
Problem: Falls - Risk of 
Goal: *Absence of Falls Description Document Abe Iraheta Fall Risk and appropriate interventions in the flowsheet. Outcome: Progressing Towards Goal 
  
Problem: Patient Education: Go to Patient Education Activity Goal: Patient/Family Education Outcome: Progressing Towards Goal 
  
Problem: Breathing Pattern - Ineffective Goal: *Absence of hypoxia Outcome: Progressing Towards Goal 
Goal: *Use of effective breathing techniques Outcome: Progressing Towards Goal 
Goal: *PALLIATIVE CARE:  Alleviation of Dyspnea Outcome: Progressing Towards Goal 
  
Problem: Patient Education: Go to Patient Education Activity Goal: Patient/Family Education Outcome: Progressing Towards Goal 
  
Problem: Patient Education: Go to Patient Education Activity Goal: Patient/Family Education Outcome: Progressing Towards Goal

## 2019-06-13 NOTE — PROGRESS NOTES
Pt A&Ox4, ambulates with assistance, hourly rounds completed, no distress noted, report given to oncoming nurse.  m

## 2019-06-13 NOTE — PROGRESS NOTES
Warfarin dosing per pharmacist 
 
Raoul Bev Valadez is a 68 y.o. female. Height: 5' 2\" (157.5 cm)    Weight: 96.3 kg (212 lb 6.4 oz) Indication: mechanical AVR Goal INR:  2-3 Home dose:  5 mg Mon; 2.5 mg all other days Risk factors or significant drug interactions:  none Other anticoagulants:  Heparin gtt Daily Monitoring Date  INR     Warfarin dose HGB              Notes 6/10  1.7  ---  8.9 
6/11  ---  ---  8.8 
6/12  1.6  5 mg  8.5 
6/12  1.5  5 mg  8.4 Pharmacy is consulted to manage warfarin for Ms. Corral during this admission. She was admitted with an INR of 3.6 and GI bleed. She had an EGD which revealed duodenal mass. Warfarin has been held until now. INR to 1.5 after being held. Restarted yesterday. Continue with 2.5 mg this evening. Following daily INR. Thank you, Christine Machado, Pharm. D. Clinical Pharmacist 
[phone]

## 2019-06-13 NOTE — PROGRESS NOTES
[facility] PROGRESS NOTE Follow up for: 
 
Subjective:  
Patient seen and examined. Feeling okay. Renal function is stable ROS: 
Gen - no fever, no chills, appetite okay CV - no chest pain, no orthopnea Lung - no shortness of breath, no cough Abd - no tenderness, no nausea, no vomiting Ext - no edema Objective:  
Exam: 
Vitals:  
 06/13/19 6921 06/13/19 0141 06/13/19 4081 06/13/19 9410 BP: 110/58  113/61 128/69 Pulse: 69  66 77 Resp: 16  16 18 Temp: 98 °F (36.7 °C)  98 °F (36.7 °C) 98.9 °F (37.2 °C) SpO2: 97% 98% 98% 96% Weight:   96.3 kg (212 lb 6.4 oz) Height:   5' 2\" (1.575 m) Intake/Output Summary (Last 24 hours) at 6/13/2019 1040 Last data filed at 6/13/2019 1024 Gross per 24 hour Intake 525 ml Output 400 ml Net 125 ml  
 
 
Current Facility-Administered Medications Medication Dose Route Frequency  warfarin (COUMADIN) tablet 2.5 mg  2.5 mg Oral QPM  
 lidocaine (XYLOCAINE) 10 mg/mL (1 %) injection 0.1 mL  0.1 mL SubCUTAneous PRN  
 0.9% sodium chloride infusion 250 mL  250 mL IntraVENous PRN  
 furosemide (LASIX) tablet 40 mg  40 mg Oral DAILY  polyethylene glycol (MIRALAX) packet 17 g  17 g Oral BID  patiromer calcium sorbitex (VELTASSA) powder 8.4 g  8.4 g Oral DAILY  ferrous gluconate 324 mg (38 mg iron) tablet 1 Tab  1 Tab Oral BID WITH MEALS  
 heparin 25,000 units in dextrose 500 mL infusion  12-25 Units/kg/hr IntraVENous TITRATE  pantoprazole (PROTONIX) 40 mg in sodium chloride 0.9% 10 mL injection  40 mg IntraVENous Q12H  
 albuterol (PROVENTIL VENTOLIN) nebulizer solution 2.5 mg  2.5 mg Nebulization Q4H PRN  
 carvedilol (COREG) tablet 6.25 mg  6.25 mg Oral BID WITH MEALS  sertraline (ZOLOFT) tablet 50 mg  50 mg Oral DAILY  simvastatin (ZOCOR) tablet 20 mg  20 mg Oral QHS  traZODone (DESYREL) tablet 50 mg  50 mg Oral QHS  vitamin E (AQUA GEMS) capsule 400 Units (Patient Supplied)  400 Units Oral DAILY  sodium chloride (NS) flush 5-40 mL  5-40 mL IntraVENous Q8H  
 sodium chloride (NS) flush 5-40 mL  5-40 mL IntraVENous PRN  
 acetaminophen (TYLENOL) tablet 650 mg  650 mg Oral Q4H PRN  
 HYDROcodone-acetaminophen (NORCO) 5-325 mg per tablet 1 Tab  1 Tab Oral Q4H PRN  
 naloxone (NARCAN) injection 0.4 mg  0.4 mg IntraVENous PRN  
 ondansetron (ZOFRAN) injection 4 mg  4 mg IntraVENous Q4H PRN  
 bisacodyl (DULCOLAX) tablet 10 mg  10 mg Oral DAILY PRN  
 0.9% sodium chloride infusion 250 mL  250 mL IntraVENous PRN  
 budesonide (PULMICORT) 500 mcg/2 ml nebulizer suspension  500 mcg Nebulization BID RT And  
 albuterol (PROVENTIL VENTOLIN) nebulizer solution 2.5 mg  2.5 mg Nebulization Q6HWA RT  
 diphenhydrAMINE (BENADRYL) capsule 25 mg  25 mg Oral Q4H PRN  
 
 
EXAM 
GEN - Alert and awake. CV - S1, S2, RRR, no rub, murmur, or gallop Lung - clear to auscultation bilaterally Abd - soft, nontender, BS present Ext - trace edema Recent Labs  
  06/13/19 
0505 06/12/19 
2238 06/12/19 
1355 06/12/19 
0546  06/11/19 
0500 WBC 6.8  --   --  8.5  --  7.6 HGB 8.4* 8.0* 8.6* 8.6*   < > 8.8* HCT 28.1* 26.4* 28.0* 28.4*   < > 28.5*  
*  --   --  137*  --  132*  
 < > = values in this interval not displayed. Recent Labs  
  06/13/19 
0505 06/12/19 
0546 06/11/19 
0500  141 140  
K 4.6 4.8 5.1  106 106 CO2 32 30 29 BUN 48* 47* 54* CREA 2.24* 2.30* 2.29* CA 9.5 9.9 9.8 * 107* 89 Assessment and Plan: FELIZ on CKD-  Cr is stable today. Continue to monitor renal function and UOP. Hyperkalemia- resolved Anemia- Hb is stable. 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 We will sign off today. Please call with any further questions.  
 
 
Clifton Carrera MD

## 2019-06-13 NOTE — PROGRESS NOTES
[facility] Progress Note Admit Date:  6/6/2019 Today's Date:  6/13/2019 CC:  GI bleeding Subjective:  
 
Patient : No further bleeding. No BM yesterday or today. Denies any abdominal pain, N&V. Dyspnea improved. Medications:  
Current Facility-Administered Medications Medication Dose Route Frequency  warfarin (COUMADIN) tablet 2.5 mg  2.5 mg Oral QPM  
 lidocaine (XYLOCAINE) 10 mg/mL (1 %) injection 0.1 mL  0.1 mL SubCUTAneous PRN  
 0.9% sodium chloride infusion 250 mL  250 mL IntraVENous PRN  
 furosemide (LASIX) tablet 40 mg  40 mg Oral DAILY  polyethylene glycol (MIRALAX) packet 17 g  17 g Oral BID  patiromer calcium sorbitex (VELTASSA) powder 8.4 g  8.4 g Oral DAILY  ferrous gluconate 324 mg (38 mg iron) tablet 1 Tab  1 Tab Oral BID WITH MEALS  
 heparin 25,000 units in dextrose 500 mL infusion  12-25 Units/kg/hr IntraVENous TITRATE  pantoprazole (PROTONIX) 40 mg in sodium chloride 0.9% 10 mL injection  40 mg IntraVENous Q12H  
 albuterol (PROVENTIL VENTOLIN) nebulizer solution 2.5 mg  2.5 mg Nebulization Q4H PRN  
 carvedilol (COREG) tablet 6.25 mg  6.25 mg Oral BID WITH MEALS  sertraline (ZOLOFT) tablet 50 mg  50 mg Oral DAILY  simvastatin (ZOCOR) tablet 20 mg  20 mg Oral QHS  traZODone (DESYREL) tablet 50 mg  50 mg Oral QHS  vitamin E (AQUA GEMS) capsule 400 Units (Patient Supplied)  400 Units Oral DAILY  sodium chloride (NS) flush 5-40 mL  5-40 mL IntraVENous Q8H  
 sodium chloride (NS) flush 5-40 mL  5-40 mL IntraVENous PRN  
 acetaminophen (TYLENOL) tablet 650 mg  650 mg Oral Q4H PRN  
 HYDROcodone-acetaminophen (NORCO) 5-325 mg per tablet 1 Tab  1 Tab Oral Q4H PRN  
 naloxone (NARCAN) injection 0.4 mg  0.4 mg IntraVENous PRN  
 ondansetron (ZOFRAN) injection 4 mg  4 mg IntraVENous Q4H PRN  
 bisacodyl (DULCOLAX) tablet 10 mg  10 mg Oral DAILY PRN  
 0.9% sodium chloride infusion 250 mL  250 mL IntraVENous PRN  
  budesonide (PULMICORT) 500 mcg/2 ml nebulizer suspension  500 mcg Nebulization BID RT And  
 albuterol (PROVENTIL VENTOLIN) nebulizer solution 2.5 mg  2.5 mg Nebulization Q6HWA RT  
 diphenhydrAMINE (BENADRYL) capsule 25 mg  25 mg Oral Q4H PRN Review of Systems: ROS was obtained, with pertinent positives as listed above. No chest pain or SOB. Diet:  Cardiac regular diet Objective:  
Vitals: 
Visit Vitals /69 Pulse 77 Temp 98.9 °F (37.2 °C) Resp 18 Ht 5' 2\" (1.575 m) Wt 96.3 kg (212 lb 6.4 oz) SpO2 96% BMI 38.85 kg/m² Intake/Output: 
No intake/output data recorded. 06/11 1901 - 06/13 0700 In: 1005 [P.O.:720; I.V.:285] Out: 400 [Urine:400] Exam: 
General appearance: alert, cooperative, no distress DAUGHTER AT BEDSIDE Lungs: clear to auscultation bilaterally anteriorly Heart: regular rate and rhythm Abdomen: soft, non-tender. Bowel sounds normal. No masses, no organomegaly Extremities: extremities normal  no cyanosis or edema BANDAGE ON LEFT LEG Neuro:  alert and oriented X4 Data Review (Labs):   
Recent Labs  
  06/13/19 
0505 06/12/19 
2238 06/12/19 
1355 06/12/19 
0546 06/11/19 
2130 06/11/19 
1244 06/11/19 
0500 06/10/19 
2353 06/10/19 
1506 06/10/19 
1238 WBC 6.8  --   --  8.5  --   --  7.6  --   --   --   
HGB 8.4* 8.0* 8.6* 8.6* 8.3* 8.6* 8.8* 8.9*  --  7.7* HCT 28.1* 26.4* 28.0* 28.4* 27.3* 27.8* 28.5* 29.2*  --  25.1*  
*  --   --  137*  --   --  132*  --   --   -- MCV 93.7  --   --  92.2  --   --  91.1  --   --   --   
  --   --  141  --   --  140  --   --   --   
K 4.6  --   --  4.8  --   --  5.1  --   --   --   
  --   --  106  --   --  106  --   --   --   
CO2 32  --   --  30  --   --  29  --   --   --   
BUN 48*  --   --  47*  --   --  54*  --   --   --   
CREA 2.24*  --   --  2.30*  --   --  2.29*  --   --   --   
CA 9.5  --   --  9.9  --   --  9.8  --   --   --   
*  --   --  107*  --   --  89  --   --   --   
 PTP 18.0*  --  18.8*  --   --   --   --   --   --   --   
INR 1.5  --  1.6  --   --   --   --   --   --   --   
APTT 110.4* 123.4*  --   --   --   --   --  49.9* 36.2  --   
 
 
Assessment:  
 
Principal Problem: 
  Symptomatic anemia (6/6/2019) Active Problems: 
  MDS (myelodysplastic syndrome) ([facility] (12/17/2011) Overview: Procrit started in August, 2011 12/18/11 Procrit weekly and Iron stores. 5-12 12-13-12 good response to 3 weekly procrit did not need it last time 3-7-13 Pt doing well. Just wanted a \"check-up. \" Responding to Procrit  
    every three weeks. 8-29-13 patient has missed some injections on recently restarted  
    hemoglobin only issue , takes oral iron iron stores each time Essential hypertension, benign (1/20/2013) CKD (chronic kidney disease) stage 4, GFR 15-29 ml/min ([facility]) (7/10/2013) COPD (chronic obstructive pulmonary disease) ([facility] (4/2/2014) Overview: HOME OXYGEN 3 LITERS 
 
  HLD (hyperlipidemia) () 
 
  S/P AVR (aortic valve replacement) () Overview: Mechanical/Artific Type 2 diabetes mellitus with nephropathy ([facility] (12/18/2017) Systolic [facility], [facility] (12/31/2018) Hyperkalemia (6/6/2019) Right shoulder pain (6/6/2019) 68 y.o. female with multiple medical problems to include myelodysplastic syndrome w anemia, CKD, CAD w cardiac arrest 5-2014 w LHC and PCI to LAD, cardiomyopathy s/p mech AVR (on coumadin) w echo  w EF 25% w severe diffuse hypokinesis (worse apical and septal) w mech AVR and mild MR- s/p Biotronik ICD, who we are following for GIB. She underwent EGD and colonoscopy on 6/11/19 by Dr. Chasity June which revealed an ulcerated duodenal mass likely the source of her bleeding. Pathology pending. Colonoscopy revealed a sigmoid polyp. Hgb today stable at 8.4 6/13 (8.0) without further bleeding. Heparin gtt restarted. Plan: 1. Await pathology 2.continue IV pantoprazole 40mg bid for now since heparin restarted 3. Serial H&H and transfuse as needed Cecile Warren. Alice Galeazzi in collaboration with [facility]

## 2019-06-13 NOTE — PROGRESS NOTES
Progress Note Patient: Shasha Rios MRN: [id number]  SSN: [id number] YOB: 1950  Age: 68 y.o. Sex: female Admit Date: 6/6/2019 LOS: 7 days Subjective:  
 
PMH significant for myelodisplastic syndrome, DM2, CKD stage 4, HTN, sHF, HLD, COPD, AVR on chronic coumadin. Admitted due to fatigue and found to be anemia more than her baseline and FELIZ. positive fecal occult blood. Patient is for EGD and colonoscopy on 6-. She was found to have duodenal mass with ulceration, that was biopsied. Also with nodular gastritis, internal hemorrhoids, signoid diverticulitis and polyp. Patient is feeling stronger today. She is eating breakfast and in good spirit. Daughter is at bedside. Objective:  
 
Vitals:  
 06/13/19 3101 06/13/19 0141 06/13/19 8547 06/13/19 1622 BP: 110/58  113/61 128/69 Pulse: 69  66 77 Resp: 16  16 18 Temp: 98 °F (36.7 °C)  98 °F (36.7 °C) 98.9 °F (37.2 °C) SpO2: 97% 98% 98% 96% Weight:   96.3 kg (212 lb 6.4 oz) Height:   5' 2\" (1.575 m) Intake and Output: 
Current Shift: No intake/output data recorded. Last three shifts: 06/11 1901 - 06/13 0700 In: 1005 [P.O.:720; I.V.:285] Out: 400 [Urine:400] Physical Exam:  
 
General:                    The patient is a pleasant elderly female in no acute distress. She is sitting up in bed and eating breakfast.  
Head:                                   Normocephalic/atraumatic. Eyes:                                   palpebral pallor, no scleral icterus. ENT:                                    External auricular and nasal exam within normal limits. Mucous membranes are moist. 
Neck:                                   Supple, non-tender, no JVD. Lungs:                       Clear to auscultation bilaterally without wheezes or crackles. No respiratory distress or accessory muscle use. Heart:                                  Regular rate and rhythm, without murmurs, rubs, or gallops. Abdomen:                  Soft, non-tender, non-distended with normoactive bowel sounds. Genitourinary:           No tenderness over the bladder or bilateral CVAs. Extremities:               Without clubbing, cyanosis, or edema. Skin:                                    Normal color, texture, and turgor. No rashes, lesions, or jaundice. Pulses:                      Radial and dorsalis pedis pulses present 2+ bilaterally. Capillary refill <2s. Neurologic:                CN II-XII grossly intact and symmetrical.  
                                            Moving all four extremities well with no focal deficits. Psychiatric:                Pleasant demeanor, appropriate affect. Alert and oriented x 3 Lab/Data Review: 
 
Recent Results (from the past 24 hour(s)) GLUCOSE, POC Collection Time: 06/12/19 11:37 AM  
Result Value Ref Range Glucose (POC) 164 (H) 65 - 100 mg/dL HGB & HCT Collection Time: 06/12/19  1:55 PM  
Result Value Ref Range HGB 8.6 (L) 11.7 - 15.4 g/dL HCT 28.0 (L) 35.8 - 46.3 % PROTHROMBIN TIME + INR Collection Time: 06/12/19  1:55 PM  
Result Value Ref Range Prothrombin time 18.8 (H) 11.7 - 14.5 sec INR 1.6 GLUCOSE, POC Collection Time: 06/12/19  4:28 PM  
Result Value Ref Range Glucose (POC) 158 (H) 65 - 100 mg/dL GLUCOSE, POC Collection Time: 06/12/19  9:06 PM  
Result Value Ref Range Glucose (POC) 157 (H) 65 - 100 mg/dL HGB & HCT Collection Time: 06/12/19 10:38 PM  
Result Value Ref Range HGB 8.0 (L) 11.7 - 15.4 g/dL HCT 26.4 (L) 35.8 - 46.3 % PTT Collection Time: 06/12/19 10:38 PM  
Result Value Ref Range aPTT 123.4 (H) 24.7 - 39.8 SEC  
CBC WITH AUTOMATED DIFF Collection Time: 06/13/19  5:05 AM  
Result Value Ref Range WBC 6.8 4.3 - 11.1 K/uL RBC 3.00 (L) 4.05 - 5.2 M/uL HGB 8.4 (L) 11.7 - 15.4 g/dL HCT 28.1 (L) 35.8 - 46.3 % MCV 93.7 79.6 - 97.8 FL  
 MCH 28.0 26.1 - 32.9 PG  
 MCHC 29.9 (L) 31.4 - 35.0 g/dL  
 RDW 15.8 (H) 11.9 - 14.6 % PLATELET 336 (L) 461 - 450 K/uL MPV 11.6 9.4 - 12.3 FL ABSOLUTE NRBC 0.03 0.0 - 0.2 K/uL  
 DF AUTOMATED NEUTROPHILS 67 43 - 78 % LYMPHOCYTES 20 13 - 44 % MONOCYTES 9 4.0 - 12.0 % EOSINOPHILS 2 0.5 - 7.8 % BASOPHILS 0 0.0 - 2.0 % IMMATURE GRANULOCYTES 1 0.0 - 5.0 %  
 ABS. NEUTROPHILS 4.6 1.7 - 8.2 K/UL  
 ABS. LYMPHOCYTES 1.4 0.5 - 4.6 K/UL  
 ABS. MONOCYTES 0.6 0.1 - 1.3 K/UL  
 ABS. EOSINOPHILS 0.2 0.0 - 0.8 K/UL  
 ABS. BASOPHILS 0.0 0.0 - 0.2 K/UL  
 ABS. IMM. GRANS. 0.1 0.0 - 0.5 K/UL PROTHROMBIN TIME + INR Collection Time: 06/13/19  5:05 AM  
Result Value Ref Range Prothrombin time 18.0 (H) 11.7 - 14.5 sec INR 1.5 METABOLIC PANEL, BASIC Collection Time: 06/13/19  5:05 AM  
Result Value Ref Range Sodium 142 136 - 145 mmol/L Potassium 4.6 3.5 - 5.1 mmol/L Chloride 104 98 - 107 mmol/L  
 CO2 32 21 - 32 mmol/L Anion gap 6 (L) 7 - 16 mmol/L Glucose 115 (H) 65 - 100 mg/dL BUN 48 (H) 8 - 23 MG/DL Creatinine 2.24 (H) 0.6 - 1.0 MG/DL  
 GFR est AA 28 (L) >60 ml/min/1.73m2 GFR est non-AA 23 (L) >60 ml/min/1.73m2 Calcium 9.5 8.3 - 10.4 MG/DL  
PTT Collection Time: 06/13/19  5:05 AM  
Result Value Ref Range aPTT 110.4 (H) 24.7 - 39.8 SEC  
GLUCOSE, POC Collection Time: 06/13/19  7:23 AM  
Result Value Ref Range Glucose (POC) 112 (H) 65 - 100 mg/dL Current Facility-Administered Medications:  
  warfarin (COUMADIN) tablet 2.5 mg, 2.5 mg, Oral, QPM, Cipriano Correa MD 
  lidocaine (XYLOCAINE) 10 mg/mL (1 %) injection 0.1 mL, 0.1 mL, SubCUTAneous, PRN, Tory Lesch, Carolynne Journey, MD 
  0.9% sodium chloride infusion 250 mL, 250 mL, IntraVENous, PRN, Nani KELLER DO 
   furosemide (LASIX) tablet 40 mg, 40 mg, Oral, DAILY, Tanya Monroe C, DO, 40 mg at 06/13/19 1011   polyethylene glycol (MIRALAX) packet 17 g, 17 g, Oral, BID, Shanice Shelley MD, 17 g at 06/13/19 1011   patiromer calcium sorbitex (VELTASSA) powder 8.4 g, 8.4 g, Oral, DAILY, Fritz Uribe MD, Stopped at 06/11/19 0900   ferrous gluconate 324 mg (38 mg iron) tablet 1 Tab, 1 Tab, Oral, BID WITH MEALS, Tanya Monroe, DO, 1 Tab at 06/13/19 1011 
  heparin 25,000 units in dextrose 500 mL infusion, 12-25 Units/kg/hr, IntraVENous, TITRATE, Ana Monroeion Petite, DO, Last Rate: 19.1 mL/hr at 06/13/19 0656, 10 Units/kg/hr at 06/13/19 9671   pantoprazole (PROTONIX) 40 mg in sodium chloride 0.9% 10 mL injection, 40 mg, IntraVENous, Q12H, Tanya Monroe, DO, 40 mg at 06/13/19 1010 
  albuterol (PROVENTIL VENTOLIN) nebulizer solution 2.5 mg, 2.5 mg, Nebulization, Q4H PRN, Balingit, Revkah, NP 
  carvedilol (COREG) tablet 6.25 mg, 6.25 mg, Oral, BID WITH MEALS, Balingit, Revkah, NP, 6.25 mg at 06/13/19 1011   sertraline (ZOLOFT) tablet 50 mg, 50 mg, Oral, DAILY, Balingit, Revkah, NP, 50 mg at 06/13/19 1011   simvastatin (ZOCOR) tablet 20 mg, 20 mg, Oral, QHS, Balingit, Revkah, NP, 20 mg at 06/12/19 2151   traZODone (DESYREL) tablet 50 mg, 50 mg, Oral, QHS, Balingit, Revkah, NP, 50 mg at 06/12/19 2151   vitamin E (AQUA GEMS) capsule 400 Units (Patient Supplied), 400 Units, Oral, DAILY, Balingit, Revkah, NP 
  sodium chloride (NS) flush 5-40 mL, 5-40 mL, IntraVENous, Q8H, NaomyitBelinda, NP, 10 mL at 06/13/19 0559   sodium chloride (NS) flush 5-40 mL, 5-40 mL, IntraVENous, PRN, Belinda Irene, NP 
  acetaminophen (TYLENOL) tablet 650 mg, 650 mg, Oral, Q4H PRN, Belinda Irene, DOROTA, 650 mg at 06/12/19 1257 
  HYDROcodone-acetaminophen (NORCO) 5-325 mg per tablet 1 Tab, 1 Tab, Oral, Q4H PRN, Belinda Irene, NP 
  naloxone (NARCAN) injection 0.4 mg, 0.4 mg, IntraVENous, PRN, Teto, DOROTA Trevino 
  ondansetron (ZOFRAN) injection 4 mg, 4 mg, IntraVENous, Q4H PRN, Balingit, Revkah, NP 
  bisacodyl (DULCOLAX) tablet 10 mg, 10 mg, Oral, DAILY PRN, Balingit, Revkah, NP 
  0.9% sodium chloride infusion 250 mL, 250 mL, IntraVENous, PRN, Belinda Irene NP 
  budesonide (PULMICORT) 500 mcg/2 ml nebulizer suspension, 500 mcg, Nebulization, BID RT, 500 mcg at 06/12/19 1934 **AND** albuterol (PROVENTIL VENTOLIN) nebulizer solution 2.5 mg, 2.5 mg, Nebulization, Q6HWA RT, Belinda Irene NP, 2.5 mg at 06/12/19 1934   diphenhydrAMINE (BENADRYL) capsule 25 mg, 25 mg, Oral, Q4H PRN, Belinda Irene NP Assessment:  
 
Principal Problem: 
  Symptomatic anemia (6/6/2019) Active Problems: 
  MDS (myelodysplastic syndrome) ([facility] (12/17/2011) Overview: Procrit started in August, 2011 12/18/11 Procrit weekly and Iron stores. 5-12 12-13-12 good response to 3 weekly procrit did not need it last time 3-7-13 Pt doing well. Just wanted a \"check-up. \" Responding to Procrit  
    every three weeks. 8-29-13 patient has missed some injections on recently restarted  
    hemoglobin only issue , takes oral iron iron stores each time Essential hypertension, benign (1/20/2013) CKD (chronic kidney disease) stage 4, GFR 15-29 ml/min ([facility]) (7/10/2013) COPD (chronic obstructive pulmonary disease) ([facility] (4/2/2014) Overview: HOME OXYGEN 3 LITERS 
 
  HLD (hyperlipidemia) () 
 
  S/P AVR (aortic valve replacement) () Overview: Mechanical/Artific Type 2 diabetes mellitus with nephropathy ([facility] (12/18/2017) Systolic CHF, acute on chronic ([facility] (12/31/2018) Hyperkalemia (6/6/2019) Right shoulder pain (6/6/2019) Plan:  
  
Acute on chronic anemia From acute blood loss and chronic illness. Procrit is given on 6/7. Monitor. Hb is holding up including result from today. GI bleeding Received PRC transfusion. EGD and colonoscopy with result above. Pending biopsy result. Mechanical AVR  
resumed [facility]. Pharmacology is helping. Will stop IV heparin when INR is in therapeutic range. MDS Monitoring. Hyperkalemia Started on [place] initially. K is improved. Monitor. Hypernatremia Resolved and improved. History of systolic CHF Continue current medications. She appears well compensated. DVT prophylaxis : Warfarin for due to presence of mechanical heart valve already I have discussed the plan of care with patient and daughter at bedside. Signed By: Chris Bernal MD   
 June 13, 2019

## 2019-06-14 NOTE — PROGRESS NOTES
Warfarin dosing per pharmacist 
 
Chetan Stevens Gab Grant is a 68 y.o. female. Height: 5' 2\" (157.5 cm)    Weight: 91.3 kg (201 lb 4.8 oz) Indication: mechanical AVR Goal INR:  2-3 Home dose:  5 mg Mon; 2.5 mg all other days Risk factors or significant drug interactions:  none Other anticoagulants:  Heparin gtt Daily Monitoring Date  INR     Warfarin dose HGB              Notes 6/10  1.7  ---  8.9 
6/11  ---  ---  8.8 
6/12  1.6  5 mg  8.5 
6/13  1.5  2.5 mg  8.4 6/14  1.7  2.5 mg  8.1 Pharmacy is consulted to manage warfarin for Ms. Corral during this admission. She was admitted with an INR of 3.6 and GI bleed. She had an EGD which revealed duodenal mass. Warfarin held until 6/12. INR moving appropriately to 1.7. Continue 2.5 mg. Following daily INR currently. Thank you, Renea Petersen, Pharm. D. Clinical Pharmacist 
[phone]

## 2019-06-14 NOTE — PROGRESS NOTES
[facility] Progress Note Admit Date:  6/6/2019 Today's Date:  6/14/2019 CC:  GI bleeding Subjective:  
 
Patient: No further bleeding. No BM in 3 days. Denies any abdominal pain, N&V Medications:  
Current Facility-Administered Medications Medication Dose Route Frequency  warfarin (COUMADIN) tablet 2.5 mg  2.5 mg Oral QPM  
 lidocaine (XYLOCAINE) 10 mg/mL (1 %) injection 0.1 mL  0.1 mL SubCUTAneous PRN  
 0.9% sodium chloride infusion 250 mL  250 mL IntraVENous PRN  
 furosemide (LASIX) tablet 40 mg  40 mg Oral DAILY  polyethylene glycol (MIRALAX) packet 17 g  17 g Oral BID  patiromer calcium sorbitex (VELTASSA) powder 8.4 g  8.4 g Oral DAILY  ferrous gluconate 324 mg (38 mg iron) tablet 1 Tab  1 Tab Oral BID WITH MEALS  
 heparin 25,000 units in dextrose 500 mL infusion  12-25 Units/kg/hr IntraVENous TITRATE  pantoprazole (PROTONIX) 40 mg in sodium chloride 0.9% 10 mL injection  40 mg IntraVENous Q12H  
 albuterol (PROVENTIL VENTOLIN) nebulizer solution 2.5 mg  2.5 mg Nebulization Q4H PRN  
 carvedilol (COREG) tablet 6.25 mg  6.25 mg Oral BID WITH MEALS  sertraline (ZOLOFT) tablet 50 mg  50 mg Oral DAILY  simvastatin (ZOCOR) tablet 20 mg  20 mg Oral QHS  traZODone (DESYREL) tablet 50 mg  50 mg Oral QHS  vitamin E (AQUA GEMS) capsule 400 Units (Patient Supplied)  400 Units Oral DAILY  sodium chloride (NS) flush 5-40 mL  5-40 mL IntraVENous Q8H  
 sodium chloride (NS) flush 5-40 mL  5-40 mL IntraVENous PRN  
 acetaminophen (TYLENOL) tablet 650 mg  650 mg Oral Q4H PRN  
 HYDROcodone-acetaminophen (NORCO) 5-325 mg per tablet 1 Tab  1 Tab Oral Q4H PRN  
 naloxone (NARCAN) injection 0.4 mg  0.4 mg IntraVENous PRN  
 ondansetron (ZOFRAN) injection 4 mg  4 mg IntraVENous Q4H PRN  
 bisacodyl (DULCOLAX) tablet 10 mg  10 mg Oral DAILY PRN  
 0.9% sodium chloride infusion 250 mL  250 mL IntraVENous PRN  
  budesonide (PULMICORT) 500 mcg/2 ml nebulizer suspension  500 mcg Nebulization BID RT And  
 albuterol (PROVENTIL VENTOLIN) nebulizer solution 2.5 mg  2.5 mg Nebulization Q6HWA RT  
 diphenhydrAMINE (BENADRYL) capsule 25 mg  25 mg Oral Q4H PRN Review of Systems: ROS was obtained, with pertinent positives as listed above. No chest pain or SOB. Diet:  Cardiac regular diet Objective:  
Vitals: 
Visit Vitals /54 (BP 1 Location: Right arm, BP Patient Position: Supine) Pulse 74 Temp 97.9 °F (36.6 °C) Resp 22 Ht 5' 2\" (1.575 m) Wt 91.3 kg (201 lb 4.8 oz) SpO2 94% BMI 36.82 kg/m² Intake/Output: 
No intake/output data recorded. 06/12 1901 - 06/14 0700 In: 0170 [P.O.:720; I.V.:837] Out: 1100 [Urine:1100] Exam: 
General appearance: alert, cooperative, no distress SMALL AMOUNT OF BLOOD OOZING AROUND CATH SITE Lungs: clear to auscultation bilaterally anteriorly Heart: regular rate and rhythm Abdomen: soft, non-tender. Bowel sounds normal. No masses, no organomegaly Extremities: extremities normal, atraumatic, no cyanosis or edema Neuro:  alert and oriented Data Review (Labs):   
Recent Labs  
  06/14/19 
0310 06/13/19 
2121 06/13/19 
1310 06/13/19 
0505 06/12/19 
2238 06/12/19 
1355 06/12/19 
0546 06/11/19 
2130 06/11/19 
1244 WBC 7.3  --   --  6.8  --   --  8.5  --   --   
HGB 8.1* 8.5* 8.6* 8.4* 8.0* 8.6* 8.6* 8.3* 8.6* HCT 27.1* 29.2* 28.8* 28.1* 26.4* 28.0* 28.4* 27.3* 27.8*  
*  --   --  122*  --   --  137*  --   -- MCV 94.1  --   --  93.7  --   --  92.2  --   --   
  --   --  142  --   --  141  --   --   
K 4.9  --   --  4.6  --   --  4.8  --   --   
  --   --  104  --   --  106  --   --   
CO2 34*  --   --  32  --   --  30  --   --   
BUN 50*  --   --  48*  --   --  47*  --   --   
CREA 2.14*  --   --  2.24*  --   --  2.30*  --   --   
CA 9.5  --   --  9.5  --   --  9.9  --   --   
*  --   --  115*  --   --  107*  --   --   
 PTP 19.3*  --   --  18.0*  --  18.8*  --   --   --   
INR 1.7  --   --  1.5  --  1.6  --   --   --   
APTT  --  64.5* 59.9* 110.4* 123.4*  --   --   --   --   
 
 
Assessment:  
 
Principal Problem: 
  Symptomatic anemia (6/6/2019) Active Problems: 
  [facility] Overview: Procrit started in August, 2011 12/18/11 Procrit weekly and Iron stores. 5-12 12-13-12 good response to 3 weekly procrit did not need it last time 3-7-13 Pt doing well. Just wanted a \"check-up. \" Responding to Procrit  
    every three weeks. 8-29-13 patient has missed some injections on recently restarted  
    hemoglobin only issue , takes oral iron iron stores each time Essential hypertension, benign (1/20/2013) CKD (chronic kidney disease) stage 4, GFR 15-29 ml/min ([facility]) (7/10/2013) COPD (chronic obstructive pulmonary disease) ([facility] (4/2/2014) Overview: HOME OXYGEN 3 LITERS 
 
  HLD (hyperlipidemia) () 
 
  S/P AVR (aortic valve replacement) () Overview: Mechanical/Artific Type 2 diabetes mellitus with nephropathy ([facility] (12/18/2017) Systolic CHF, acute on chronic ([facility] (12/31/2018) Hyperkalemia (6/6/2019) Right shoulder pain (6/6/2019) 68 y.o. female with multiple medical problems to include myelodysplastic syndrome w anemia, CKD, CAD w cardiac arrest 5-2014 w LHC and PCI to LAD, cardiomyopathy s/p mech AVR (on coumadin) w echo  w EF 25% w severe diffuse hypokinesis (worse apical and septal) w mech AVR and mild MR- s/p Biotronik ICD, who we are following for GIB. She underwent EGD and colonoscopy on 6/11/19 by Dr. Asiya Barriga which revealed an ulcerated duodenal mass likely the source of her bleeding. Pathology revealed a duodenal adenoma. Colonoscopy revealed a sigmoid polyp. Hgb today slightly decreased at 8.1 6/14 (8.4 without further bleeding. Heparin gtt restarted. Plan: 1.Serial H&H and transfuse as needed 2. Continue IV pantoprazole 40mg bid 3. No plans for further endoscopy right now. Will plan on repeat EGD with EMR of duodenal adenoma in the next 1-2 mos (once anticoagulation stabilized) 4. Dulcolax supp for constipation 5.continue MIralax [place]. Socorro Guadalupe in collaboration with Dr. Cheryle Ing 
[facility]

## 2019-06-14 NOTE — PROGRESS NOTES
Patient has been accepted to [facility] rehab. Patient can discharge to facility once medically stable.

## 2019-06-14 NOTE — PROGRESS NOTES
am 
6/14/2019 9:17 AM 
 
Admit Date: 6/6/2019 Admit Diagnosis: Symptomatic anemia [D64.9] Subjective:  
 Patient resting in bed doing well. She is wanting to have the GI  procedure while she is in the hospital. Will discuss with GI. On heparin drip and coumadin, INR 1.7. Objective:  
  
Visit Vitals /54 (BP 1 Location: Right arm, BP Patient Position: Supine) Pulse 74 Temp 97.9 °F (36.6 °C) Resp 22 Ht 5' 2\" (1.575 m) Wt 201 lb 4.8 oz (91.3 kg) SpO2 94% BMI 36.82 kg/m² ROS:  General ROS: negative for - chills Hematological and Lymphatic ROS: negative for - blood clots or jaundice; oozing around right neck line site. Respiratory ROS: positive for - shortness of breath Cardiovascular ROS: no chest pain or dyspnea on exertion Gastrointestinal ROS: no abdominal pain, change in bowel habits, or black or bloody stools Neurological ROS: no TIA or stroke symptoms Physical Exam: 
 
Physical Examination: General appearance - alert, well appearing, and in no distress Mental status - alert, oriented to person, place, and time Eyes - pupils equal and reactive, extraocular eye movements intact Neck/lymph - supple, no significant adenopathy Chest/CV - clear to auscultation, no wheezes, rales or rhonchi, symmetric air entry Heart - normal rate, regular rhythm, normal S1, S2, no murmurs, rubs, clicks or gallops Abdomen/GI - soft, nontender, nondistended, no masses or organomegaly Musculoskeletal - no joint tenderness, deformity or swelling Extremities - peripheral pulses normal, no pedal edema, no clubbing or cyanosis Skin - normal coloration and turgor, no rashes, no suspicious skin lesions noted Current Facility-Administered Medications Medication Dose Route Frequency  bisacodyl (DULCOLAX) suppository 10 mg  10 mg Rectal ONCE  warfarin (COUMADIN) tablet 2.5 mg  2.5 mg Oral QPM  
 lidocaine (XYLOCAINE) 10 mg/mL (1 %) injection 0.1 mL  0.1 mL SubCUTAneous PRN  
  0.9% sodium chloride infusion 250 mL  250 mL IntraVENous PRN  
 furosemide (LASIX) tablet 40 mg  40 mg Oral DAILY  polyethylene glycol (MIRALAX) packet 17 g  17 g Oral BID  patiromer calcium sorbitex (VELTASSA) powder 8.4 g  8.4 g Oral DAILY  ferrous gluconate 324 mg (38 mg iron) tablet 1 Tab  1 Tab Oral BID WITH MEALS  
 heparin 25,000 units in dextrose 500 mL infusion  12-25 Units/kg/hr IntraVENous TITRATE  pantoprazole (PROTONIX) 40 mg in sodium chloride 0.9% 10 mL injection  40 mg IntraVENous Q12H  
 albuterol (PROVENTIL VENTOLIN) nebulizer solution 2.5 mg  2.5 mg Nebulization Q4H PRN  
 carvedilol (COREG) tablet 6.25 mg  6.25 mg Oral BID WITH MEALS  sertraline (ZOLOFT) tablet 50 mg  50 mg Oral DAILY  simvastatin (ZOCOR) tablet 20 mg  20 mg Oral QHS  traZODone (DESYREL) tablet 50 mg  50 mg Oral QHS  vitamin E (AQUA GEMS) capsule 400 Units (Patient Supplied)  400 Units Oral DAILY  sodium chloride (NS) flush 5-40 mL  5-40 mL IntraVENous Q8H  
 sodium chloride (NS) flush 5-40 mL  5-40 mL IntraVENous PRN  
 acetaminophen (TYLENOL) tablet 650 mg  650 mg Oral Q4H PRN  
 HYDROcodone-acetaminophen (NORCO) 5-325 mg per tablet 1 Tab  1 Tab Oral Q4H PRN  
 naloxone (NARCAN) injection 0.4 mg  0.4 mg IntraVENous PRN  
 ondansetron (ZOFRAN) injection 4 mg  4 mg IntraVENous Q4H PRN  
 bisacodyl (DULCOLAX) tablet 10 mg  10 mg Oral DAILY PRN  
 0.9% sodium chloride infusion 250 mL  250 mL IntraVENous PRN  
 budesonide (PULMICORT) 500 mcg/2 ml nebulizer suspension  500 mcg Nebulization BID RT And  
 albuterol (PROVENTIL VENTOLIN) nebulizer solution 2.5 mg  2.5 mg Nebulization Q6HWA RT  
 diphenhydrAMINE (BENADRYL) capsule 25 mg  25 mg Oral Q4H PRN Data Review:  
@LABRCNT(Na,K,BUN,CREA,WBC,HGB,HCT,PLT,INR,TRP,TCHOL*,Triglyceride*,LDL*,LDLCPOC HDL*,HDL])@ TELEMETRY: SR with PVC Assessment/Plan:  
 
Principal Problem: Symptomatic anemia (6/6/2019)- no further bleeding. Serial H&H and transfuse per GI and primary team 
 
Active Problems: 
  MDS (myelodysplastic syndrome) ([facility] (12/17/2011) Overview: Procrit started in August, 2011 12/18/11 Procrit weekly and Iron stores. 5-12 Per Hem/Onc and primary team 
 
  Essential hypertension, benign (1/20/2013)- The current medical regimen is effective;  continue present plan and medications. CKD (chronic kidney disease) stage 4, GFR 15-29 ml/min ([facility]) (7/10/2013)- The current medical regimen is effective;  continue present plan and medications. COPD (chronic obstructive pulmonary disease) ([facility].) (4/2/2014) Overview: HOME OXYGEN 3 LITERS The current medical regimen is effective;  continue present plan and medications. HLD (hyperlipidemia) () The current medical regimen is effective;  continue present plan and medications. S/P AVR (aortic valve replacement) () Overview: Mechanical/Artific On Coumadin and heparin gtt bridge. INR currently 1.7 Type 2 diabetes mellitus with nephropathy ([facility] (12/18/2017) - The current medical regimen is effective;  continue present plan and medications. Systolic CHF, acute on chronic ([facility]) (12/31/2018)The current medical regimen is effective;  continue present plan and medications. Hyperkalemia (6/6/2019)- stable today at 4.9. The current medical regimen is effective;  continue present plan and medications. GI plan to bring pt back for repeat EGD with EMR of duodenal adenoma in 1-2 months. On coumadin and heparin gtt bridge. INR 1.7 today; check INR tomorrow. Possible discontinue heparin drip tomorrow if INR is 2 or greater. Mariah Paul, [facility] DNP Student

## 2019-06-14 NOTE — PROGRESS NOTES
Hourly rounding completed on this shift. No new complaints at this time. All needs met. Wound dressing changed on this shift. Pt is currently resting in bed. Will continue to monitor and give report to oncoming nurse.

## 2019-06-14 NOTE — PROGRESS NOTES
Problem: Falls - Risk of 
Goal: *Absence of Falls Description Document Miguel Handy Fall Risk and appropriate interventions in the flowsheet. Outcome: Progressing Towards Goal 
  
Problem: Breathing Pattern - Ineffective Goal: *Absence of hypoxia Outcome: Progressing Towards Goal

## 2019-06-14 NOTE — PROGRESS NOTES
CM spoke with pt's dx regarding denial form [id number]. Dx would like to speak with sisters to discuss other placement options. Dx will reach out to CM later today. Cm will follow.

## 2019-06-14 NOTE — PROGRESS NOTES
Rounding done every hour and PRN while patient in room. Patient resting in room. No signs or symptoms of distress. No changes in status. Patient denies any further needs or pain at this time. Patient sitting up in chair. Headache resolved after sleeping.

## 2019-06-14 NOTE — PROGRESS NOTES
Patient has been declined from [facility]. Notified patient and daughter. Family would like referral sent to [facility]. Referral sent. Awaiting response.

## 2019-06-14 NOTE — PROGRESS NOTES
Nutrition LOS Note:  
Assessment DIET CARDIAC Regular Food,Nutrition, and Pertinent History: The patient is noted to have a h/o MDS, CKD stage 4, COPD, CHF and Diabetes. She states that her appetite is doing well. She denies any po difficulties at this time. She has no questions or concerns regarding food or nutrition at this time. Anthropometrics: Height: 5' 2\" (157.5 cm), Weight Source: Standing scale (comment), Weight: 91.3 kg (201 lb 4.8 oz), Body mass index is 36.82 kg/m². BMI class of overweight for age >71. Macronutrient Needs (Standard BW: 64 kg): 
· EER:  7312-8833 kcal /day (20-25 kcal/kg standard BW) · EPR:  51-64 grams protein/day (0.8-1 grams/kg Standard BW-CKD 4 with GFR of 29) Intake/Comparative Standards:  Average intake for past 7 day(s)/6 recorded meal(s): 85%. This potentially meets ~100% of kcal and ~100% of protein needs Nutrition Diagnosis: No nutrition diagnosis at this time Intervention:  
Meals and snacks: Continue current diet. Discharge nutrition plan: No discharge needs identified Preethi Choudhury [address], [address] [place] [address], LD  
[phone]

## 2019-06-14 NOTE — PROGRESS NOTES
Pt's dx [facility] spoke with family and they have requested referral be sent to [facility], as they accept Trilogy BiPAP. Referral sent-awaiting response.

## 2019-06-14 NOTE — PROGRESS NOTES
[facility] has declined patient d/t trilogy machine. Message left for daughter. Awaiting response.

## 2019-06-14 NOTE — PROGRESS NOTES
Progress Note Patient: Tara Michael MRN: [id number]  SSN: [id number] YOB: 1950  Age: 68 y.o. Sex: female Admit Date: 6/6/2019 LOS: 8 days Subjective:  
 
PMH significant for myelodisplastic syndrome, DM2, CKD stage 4, HTN, sHF, HLD, COPD, AVR on chronic coumadin. Admitted due to fatigue and found to be anemia more than her baseline and FELIZ. positive fecal occult blood. Patient is for EGD and colonoscopy on 6-. She was found to have duodenal mass with ulceration, that was biopsied. Also with nodular gastritis, internal hemorrhoids, signoid diverticulitis and polyp. Patient is feeling the same. No nausea. No vomiting. No fever. No shaking. No chills. Eating fine. In good spirit. Objective:  
 
Vitals:  
 06/13/19 2350 06/14/19 0502 06/14/19 3167 06/14/19 5045 BP: (!) 135/97 119/57 114/54 Pulse: 75 76 74 Resp: 20 20 22 Temp: 98.3 °F (36.8 °C) 98.1 °F (36.7 °C) 97.9 °F (36.6 °C) SpO2: 90% 97% 98% 94% Weight:  91.3 kg (201 lb 4.8 oz) Height:      
  
 
Intake and Output: 
Current Shift: No intake/output data recorded. Last three shifts: 06/12 1901 - 06/14 0700 In: 1316 [P.O.:720; I.V.:837] Out: 1100 [Urine:1100] Physical Exam:  
 
General:                    The patient is a pleasant elderly female in no acute distress. She is lying down in bed and is comfortable. Head:                                   Normocephalic/atraumatic. Eyes:                                   palpebral pallor, no scleral icterus. ENT:                                    External auricular and nasal exam within normal limits. Mucous membranes are moist. 
Neck:                                   Supple, non-tender, no JVD. Lungs:                       Clear to auscultation bilaterally without wheezes or crackles.  
                                            No respiratory distress or accessory muscle use. Heart:                                  Regular rate and rhythm, without murmurs, rubs, or gallops. Abdomen:                  Soft, non-tender, non-distended with normoactive bowel sounds. Genitourinary:           No tenderness over the bladder or bilateral CVAs. Extremities:               Without clubbing, cyanosis, or edema. Skin:                                    Normal color, texture, and turgor. No rashes, lesions, or jaundice. Pulses:                      Radial and dorsalis pedis pulses present 2+ bilaterally. Capillary refill <2s. Neurologic:                CN II-XII grossly intact and symmetrical.  
                                            Moving all four extremities well with no focal deficits. Psychiatric:                Pleasant demeanor, appropriate affect. Alert and oriented x 3 Lab/Data Review: 
 
Recent Results (from the past 24 hour(s)) GLUCOSE, POC Collection Time: 06/13/19 11:26 AM  
Result Value Ref Range Glucose (POC) 143 (H) 65 - 100 mg/dL HGB & HCT Collection Time: 06/13/19  1:10 PM  
Result Value Ref Range HGB 8.6 (L) 11.7 - 15.4 g/dL HCT 28.8 (L) 35.8 - 46.3 % PTT Collection Time: 06/13/19  1:10 PM  
Result Value Ref Range aPTT 59.9 (H) 24.7 - 39.8 SEC  
GLUCOSE, POC Collection Time: 06/13/19  4:25 PM  
Result Value Ref Range Glucose (POC) 168 (H) 65 - 100 mg/dL HGB & HCT Collection Time: 06/13/19  9:21 PM  
Result Value Ref Range HGB 8.5 (L) 11.7 - 15.4 g/dL HCT 29.2 (L) 35.8 - 46.3 % PTT Collection Time: 06/13/19  9:21 PM  
Result Value Ref Range aPTT 64.5 (H) 24.7 - 39.8 SEC  
CBC WITH AUTOMATED DIFF Collection Time: 06/14/19  3:10 AM  
Result Value Ref Range WBC 7.3 4.3 - 11.1 K/uL  
 RBC 2.88 (L) 4.05 - 5.2 M/uL HGB 8.1 (L) 11.7 - 15.4 g/dL HCT 27.1 (L) 35.8 - 46.3 %  MCV 94.1 79.6 - 97.8 FL  
 MCH 28.1 26.1 - 32.9 PG  
 MCHC 29.9 (L) 31.4 - 35.0 g/dL  
 RDW 15.7 (H) 11.9 - 14.6 % PLATELET 796 (L) 573 - 450 K/uL MPV 11.8 9.4 - 12.3 FL ABSOLUTE NRBC 0.03 0.0 - 0.2 K/uL  
 DF AUTOMATED NEUTROPHILS 70 43 - 78 % LYMPHOCYTES 17 13 - 44 % MONOCYTES 10 4.0 - 12.0 % EOSINOPHILS 3 0.5 - 7.8 % BASOPHILS 0 0.0 - 2.0 % IMMATURE GRANULOCYTES 0 0.0 - 5.0 %  
 ABS. NEUTROPHILS 5.1 1.7 - 8.2 K/UL  
 ABS. LYMPHOCYTES 1.2 0.5 - 4.6 K/UL  
 ABS. MONOCYTES 0.7 0.1 - 1.3 K/UL  
 ABS. EOSINOPHILS 0.2 0.0 - 0.8 K/UL  
 ABS. BASOPHILS 0.0 0.0 - 0.2 K/UL  
 ABS. IMM. GRANS. 0.0 0.0 - 0.5 K/UL PROTHROMBIN TIME + INR Collection Time: 06/14/19  3:10 AM  
Result Value Ref Range Prothrombin time 19.3 (H) 11.7 - 14.5 sec INR 1.7 METABOLIC PANEL, BASIC Collection Time: 06/14/19  3:10 AM  
Result Value Ref Range Sodium 143 136 - 145 mmol/L Potassium 4.9 3.5 - 5.1 mmol/L Chloride 106 98 - 107 mmol/L  
 CO2 34 (H) 21 - 32 mmol/L Anion gap 3 (L) 7 - 16 mmol/L Glucose 115 (H) 65 - 100 mg/dL BUN 50 (H) 8 - 23 MG/DL Creatinine 2.14 (H) 0.6 - 1.0 MG/DL  
 GFR est AA 29 (L) >60 ml/min/1.73m2 GFR est non-AA 24 (L) >60 ml/min/1.73m2 Calcium 9.5 8.3 - 10.4 MG/DL  
GLUCOSE, POC Collection Time: 06/14/19  7:23 AM  
Result Value Ref Range Glucose (POC) 135 (H) 65 - 100 mg/dL PTT Collection Time: 06/14/19  8:26 AM  
Result Value Ref Range aPTT >200.0 (HH) 24.7 - 39.8 SEC Current Facility-Administered Medications:  
  warfarin (COUMADIN) tablet 2.5 mg, 2.5 mg, Oral, QPM, Cipriano Correa MD, 2.5 mg at 06/13/19 1728   lidocaine (XYLOCAINE) 10 mg/mL (1 %) injection 0.1 mL, 0.1 mL, SubCUTAneous, PRN, Andreina Landeros MD 
  0.9% sodium chloride infusion 250 mL, 250 mL, IntraVENous, PRN, Tanya Monroe DO 
  furosemide (LASIX) tablet 40 mg, 40 mg, Oral, DAILY, Tanya Monroe DO, 40 mg at 06/14/19 0901   polyethylene glycol (MIRALAX) packet 17 g, 17 g, Oral, BID, Merchant Shanice CALLES MD, 17 g at 06/13/19 1728   patiromer calcium sorbitex (VELTASSA) powder 8.4 g, 8.4 g, Oral, DAILY, Jaci Uribe MD, Stopped at 06/11/19 0900   ferrous gluconate 324 mg (38 mg iron) tablet 1 Tab, 1 Tab, Oral, BID WITH MEALS, Shauna Monroe, , 1 Tab at 06/14/19 7588   heparin 25,000 units in dextrose 500 mL infusion, 12-25 Units/kg/hr, IntraVENous, TITRATE, Tanya Monroe, DO, Last Rate: 21.1 mL/hr at 06/14/19 1029, 11 Units/kg/hr at 06/14/19 1029 
  pantoprazole (PROTONIX) 40 mg in sodium chloride 0.9% 10 mL injection, 40 mg, IntraVENous, Q12H, Tanya Monroe, DO, 40 mg at 06/14/19 0901 
  albuterol (PROVENTIL VENTOLIN) nebulizer solution 2.5 mg, 2.5 mg, Nebulization, Q4H PRN, Balingit, Revkah, NP 
  carvedilol (COREG) tablet 6.25 mg, 6.25 mg, Oral, BID WITH MEALS, Chrisingit, Revkah, NP, 6.25 mg at 06/14/19 3266   sertraline (ZOLOFT) tablet 50 mg, 50 mg, Oral, DAILY, Balingit, Revkah, NP, 50 mg at 06/14/19 0901   simvastatin (ZOCOR) tablet 20 mg, 20 mg, Oral, QHS, Balingit, Revkah, NP, 20 mg at 06/13/19 2156   traZODone (DESYREL) tablet 50 mg, 50 mg, Oral, QHS, Balingit, Revkah, NP, 50 mg at 06/13/19 2156   vitamin E (AQUA GEMS) capsule 400 Units (Patient Supplied), 400 Units, Oral, DAILY, Balingit, Revkah, NP 
  sodium chloride (NS) flush 5-40 mL, 5-40 mL, IntraVENous, Q8H, Balingit, Revkah, NP, 10 mL at 06/14/19 0601 
  sodium chloride (NS) flush 5-40 mL, 5-40 mL, IntraVENous, PRN, Belinda Irene, NP 
  acetaminophen (TYLENOL) tablet 650 mg, 650 mg, Oral, Q4H PRN, Belinda Irene NP, 650 mg at 06/13/19 1537 
  HYDROcodone-acetaminophen (NORCO) 5-325 mg per tablet 1 Tab, 1 Tab, Oral, Q4H PRN, Belinda Irene NP 
  naloxone (NARCAN) injection 0.4 mg, 0.4 mg, IntraVENous, PRN, Belinda Irene, NP 
  ondansetron (ZOFRAN) injection 4 mg, 4 mg, IntraVENous, Q4H PRN, Balingit, Revkah, NP 
  bisacodyl (DULCOLAX) tablet 10 mg, 10 mg, Oral, DAILY PRN, Teto, Revkah, NP 
  0.9% sodium chloride infusion 250 mL, 250 mL, IntraVENous, PRN, Belinda Irene, DOROTA 
  budesonide (PULMICORT) 500 mcg/2 ml nebulizer suspension, 500 mcg, Nebulization, BID RT, 500 mcg at 06/14/19 0812 **AND** albuterol (PROVENTIL VENTOLIN) nebulizer solution 2.5 mg, 2.5 mg, Nebulization, Q6HWA RT, Belinda Irene NP, 2.5 mg at 06/14/19 1167   diphenhydrAMINE (BENADRYL) capsule 25 mg, 25 mg, Oral, Q4H PRN, Belinda Irene NP Assessment:  
 
Principal Problem: 
  Symptomatic anemia (6/6/2019) Active Problems: 
  MDS (myelodysplastic syndrome) ([facility] (12/17/2011) Overview: Procrit started in August, 2011 12/18/11 Procrit weekly and Iron stores. 5-12 12-13-12 good response to 3 weekly procrit did not need it last time 3-7-13 Pt doing well. Just wanted a \"check-up. \" Responding to Procrit  
    every three weeks. 8-29-13 patient has missed some injections on recently restarted  
    hemoglobin only issue , takes oral iron iron stores each time Essential hypertension, benign (1/20/2013) CKD (chronic kidney disease) stage 4, GFR 15-29 ml/min ([facility]) (7/10/2013) COPD (chronic obstructive pulmonary disease) ([facility] (4/2/2014) Overview: HOME OXYGEN 3 LITERS 
 
  HLD (hyperlipidemia) () 
 
  S/P AVR (aortic valve replacement) () Overview: Mechanical/Artific Type 2 diabetes mellitus with nephropathy ([facility] (12/18/2017) Systolic CHF, acute on chronic ([facility] (12/31/2018) Hyperkalemia (6/6/2019) Right shoulder pain (6/6/2019) Plan:  
  
Acute on chronic anemia From acute blood loss and chronic illness. Procrit is given on 6/7. Monitor. Hb is holding up. No active bleeding episodes in hospital.  
 
GI bleeding Received PRC transfusion. EGD and colonoscopy with result above. Pending biopsy result. Mechanical AVR Resumed warfarin. Pharmacology is helping. Will stop IV heparin when INR is in therapeutic range. MDS Monitoring. Hyperkalemia Started on [place] initially. K is improved. Monitor. Hypernatremia Resolved and improved. History of systolic CHF Continue current medications. She appears well compensated. DVT prophylaxis : Warfarin due to presence of mechanical heart valve already I have discussed the plan of care with patient. Signed By: Alexei Barragan MD   
 June 14, 2019

## 2019-06-14 NOTE — PROGRESS NOTES
Interdisciplinary Rounds completed 06/14/19. Nursing, Case Management, Physician and PT present. Plan of care reviewed and updated. Will go to rehab when ready. Probably Monday.

## 2019-06-15 NOTE — PROGRESS NOTES
Warfarin dosing per pharmacist 
 
Mike Yogesh Childs is a 68 y.o. female. Height: 5' 2\" (157.5 cm)    Weight: 95.9 kg (211 lb 8 oz) Indication: mechanical AVR Goal INR:  2-3 Home dose:  5 mg Mon; 2.5 mg all other days Risk factors or significant drug interactions:  none Other anticoagulants:  Heparin gtt Daily Monitoring Date  INR     Warfarin dose HGB              Notes 6/10  1.7  ---  8.9 
6/11  ---  ---  8.8 
6/12  1.6  5 mg  8.5 
6/13  1.5  2.5 mg  8.4 6/14  1.7  2.5 mg  8.1 6/15  1.7  5 mg  7.8 Pharmacy is consulted to manage warfarin for Ms. Corral during this admission. She was admitted with an INR of 3.6 and GI bleed. She had an EGD which revealed duodenal mass. Warfarin held until 6/12. INR remains 1.7. Will give 5 mg tonight. Daily INR. Pharmacy will continue to follow. Please call with any questions. Thank you, Christine Dior, PharmD Clinical Pharmacist 
[phone]

## 2019-06-15 NOTE — PROGRESS NOTES
Progress Note Patient: Wai Espinosa MRN: [id number]  SSN: [id number] YOB: 1950  Age: 68 y.o. Sex: female Admit Date: 6/6/2019 LOS: 9 days Subjective:  
 
PMH significant for myelodisplastic syndrome, DM2, CKD stage 4, HTN, sHF, HLD, COPD, AVR on chronic coumadin. Admitted due to fatigue and found to be anemia more than her baseline and FELIZ. positive fecal occult blood. Patient is for EGD and colonoscopy on 6-. She was found to have duodenal mass with ulceration, that was biopsied. Also with nodular gastritis, internal hemorrhoids, signoid diverticulitis and polyp. Patient is feeling the same. Still not reached therapeutic level of INR yet. No bleeding. Objective:  
 
Vitals:  
 06/14/19 7363 06/15/19 0533 06/15/19 8681 06/15/19 0740 BP: 112/57 102/53 111/55 Pulse: 69 70 74 Resp: 18 18 16 Temp: 97.5 °F (36.4 °C) 98 °F (36.7 °C) 97.9 °F (36.6 °C) SpO2: 90% 98% 98% 97% Weight:  95.9 kg (211 lb 8 oz) Height:      
  
 
Intake and Output: 
Current Shift: 06/15 0701 - 06/15 1900 In: 360 [P.O.:360] Out: 200 [Urine:200] Last three shifts: 06/13 1901 - 06/15 0700 In: 1400 [P.O.:600; I.V.:800] Out: 1800 [Urine:1800] Physical Exam:  
 
General:                    The patient is a pleasant elderly female in no acute distress. She is sitting up in her chair. In good spirit. Head:                                   Normocephalic/atraumatic. Eyes:                                   palpebral pallor, no scleral icterus. ENT:                                    External auricular and nasal exam within normal limits. Mucous membranes are moist. 
Neck:                                   Supple, non-tender, no JVD. Lungs:                       Clear to auscultation bilaterally without wheezes or crackles.  
                                            No respiratory distress or accessory muscle use. Heart:                                  Regular rate and rhythm, without murmurs, rubs, or gallops. Abdomen:                  Soft, non-tender, non-distended with normoactive bowel sounds. Genitourinary:           No tenderness over the bladder or bilateral CVAs. Extremities:               Without clubbing, cyanosis, or edema. Skin:                                    Normal color, texture, and turgor. No rashes, lesions, or jaundice. Pulses:                      Radial and dorsalis pedis pulses present 2+ bilaterally. Capillary refill <2s. Neurologic:                CN II-XII grossly intact and symmetrical.  
                                            Moving all four extremities well with no focal deficits. Psychiatric:                Pleasant demeanor, appropriate affect. Alert and oriented x 3 Lab/Data Review: 
 
Recent Results (from the past 24 hour(s)) GLUCOSE, POC Collection Time: 06/14/19 11:42 AM  
Result Value Ref Range Glucose (POC) 121 (H) 65 - 100 mg/dL HGB & HCT Collection Time: 06/14/19 12:49 PM  
Result Value Ref Range HGB 8.0 (L) 11.7 - 15.4 g/dL HCT 26.9 (L) 35.8 - 46.3 % GLUCOSE, POC Collection Time: 06/14/19  4:37 PM  
Result Value Ref Range Glucose (POC) 135 (H) 65 - 100 mg/dL PTT Collection Time: 06/14/19  4:57 PM  
Result Value Ref Range aPTT 198.7 (HH) 24.7 - 39.8 SEC  
CBC WITH AUTOMATED DIFF Collection Time: 06/15/19  1:40 AM  
Result Value Ref Range WBC 5.5 4.3 - 11.1 K/uL  
 RBC 2.74 (L) 4.05 - 5.2 M/uL HGB 7.8 (L) 11.7 - 15.4 g/dL HCT 26.1 (L) 35.8 - 46.3 % MCV 95.3 79.6 - 97.8 FL  
 MCH 28.5 26.1 - 32.9 PG  
 MCHC 29.9 (L) 31.4 - 35.0 g/dL  
 RDW 16.0 (H) 11.9 - 14.6 % PLATELET 757 (L) 583 - 450 K/uL MPV 11.3 9.4 - 12.3 FL ABSOLUTE NRBC 0.00 0.0 - 0.2 K/uL  
 DF AUTOMATED NEUTROPHILS 72 43 - 78 % LYMPHOCYTES 13 13 - 44 % MONOCYTES 10 4.0 - 12.0 % EOSINOPHILS 4 0.5 - 7.8 % BASOPHILS 1 0.0 - 2.0 % IMMATURE GRANULOCYTES 0 0.0 - 5.0 %  
 ABS. NEUTROPHILS 3.9 1.7 - 8.2 K/UL  
 ABS. LYMPHOCYTES 0.7 0.5 - 4.6 K/UL  
 ABS. MONOCYTES 0.6 0.1 - 1.3 K/UL  
 ABS. EOSINOPHILS 0.2 0.0 - 0.8 K/UL  
 ABS. BASOPHILS 0.0 0.0 - 0.2 K/UL  
 ABS. IMM. GRANS. 0.0 0.0 - 0.5 K/UL METABOLIC PANEL, BASIC Collection Time: 06/15/19  1:40 AM  
Result Value Ref Range Sodium 143 136 - 145 mmol/L Potassium 5.2 (H) 3.5 - 5.1 mmol/L Chloride 104 98 - 107 mmol/L  
 CO2 36 (H) 21 - 32 mmol/L Anion gap 3 (L) 7 - 16 mmol/L Glucose 72 65 - 100 mg/dL BUN 48 (H) 8 - 23 MG/DL Creatinine 2.21 (H) 0.6 - 1.0 MG/DL  
 GFR est AA 28 (L) >60 ml/min/1.73m2 GFR est non-AA 23 (L) >60 ml/min/1.73m2 Calcium 9.2 8.3 - 10.4 MG/DL  
PTT Collection Time: 06/15/19  1:40 AM  
Result Value Ref Range aPTT 63.7 (H) 24.7 - 39.8 SEC PROTHROMBIN TIME + INR Collection Time: 06/15/19  1:40 AM  
Result Value Ref Range Prothrombin time 19.9 (H) 11.7 - 14.5 sec INR 1.7 GLUCOSE, POC Collection Time: 06/15/19  7:29 AM  
Result Value Ref Range Glucose (POC) 110 (H) 65 - 100 mg/dL Current Facility-Administered Medications:  
  warfarin (COUMADIN) tablet 2.5 mg, 2.5 mg, Oral, QPM, Cipriano Correa MD, 2.5 mg at 06/14/19 1703   lidocaine (XYLOCAINE) 10 mg/mL (1 %) injection 0.1 mL, 0.1 mL, SubCUTAneous, PRN, Zoran Wright MD 
  0.9% sodium chloride infusion 250 mL, 250 mL, IntraVENous, PRN, Tayna Monroe,  
  furosemide (LASIX) tablet 40 mg, 40 mg, Oral, DAILY, Tanya Monroe DO, 40 mg at 06/15/19 3085   polyethylene glycol (MIRALAX) packet 17 g, 17 g, Oral, BID, Shanice Shelley MD, 17 g at 06/15/19 1771   patiromer calcium sorbitex (VELTASSA) powder 8.4 g, 8.4 g, Oral, DAILY, Rakesh Uribe MD, 8.4 g at 06/15/19 2181   ferrous gluconate 324 mg (38 mg iron) tablet 1 Tab, 1 Tab, Oral, BID WITH MEALS, [place], [place], 1 Tab at 06/15/19 0736   heparin 25,000 units in dextrose 500 mL infusion, 12-25 Units/kg/hr, IntraVENous, TITRATE, Reji Monroe DO, Last Rate: 19.1 mL/hr at 06/15/19 0338, 10 Units/kg/hr at 06/15/19 9685   pantoprazole (PROTONIX) 40 mg in sodium chloride 0.9% 10 mL injection, 40 mg, IntraVENous, Q12H, Tanya Monroe DO, 40 mg at 06/15/19 0817 
  albuterol (PROVENTIL VENTOLIN) nebulizer solution 2.5 mg, 2.5 mg, Nebulization, Q4H PRN, Balingit, Revkah, NP 
  carvedilol (COREG) tablet 6.25 mg, 6.25 mg, Oral, BID WITH MEALS, Balingit, Revkah, NP, 6.25 mg at 06/15/19 1393   sertraline (ZOLOFT) tablet 50 mg, 50 mg, Oral, DAILY, Balingit, Revkah, NP, 50 mg at 06/15/19 7762   simvastatin (ZOCOR) tablet 20 mg, 20 mg, Oral, QHS, Balingit, Revkah, NP, 20 mg at 06/14/19 2108   traZODone (DESYREL) tablet 50 mg, 50 mg, Oral, QHS, Balingit, Revkah, NP, 50 mg at 06/14/19 2108   vitamin E (AQUA GEMS) capsule 400 Units (Patient Supplied), 400 Units, Oral, DAILY, Balingit, Revkah, NP 
  sodium chloride (NS) flush 5-40 mL, 5-40 mL, IntraVENous, Q8H, Balingit, Revkah, NP, 10 mL at 06/15/19 0546   sodium chloride (NS) flush 5-40 mL, 5-40 mL, IntraVENous, PRN, Balingit, Revkah, NP 
  acetaminophen (TYLENOL) tablet 650 mg, 650 mg, Oral, Q4H PRN, Balingit, Revkah, NP, 650 mg at 06/15/19 0815 
  HYDROcodone-acetaminophen (NORCO) 5-325 mg per tablet 1 Tab, 1 Tab, Oral, Q4H PRN, Balingit, Revkah, NP 
  naloxone (NARCAN) injection 0.4 mg, 0.4 mg, IntraVENous, PRN, Balingit, kah, NP 
  ondansetron (ZOFRAN) injection 4 mg, 4 mg, IntraVENous, Q4H PRN, Balingit, Revkah, NP 
  bisacodyl (DULCOLAX) tablet 10 mg, 10 mg, Oral, DAILY PRN, Chrisingit, Belinda, NP 
  0.9% sodium chloride infusion 250 mL, 250 mL, IntraVENous, PRN, Balingit, Revkah, NP 
  budesonide (PULMICORT) 500 mcg/2 ml nebulizer suspension, 500 mcg, Nebulization, BID RT, 500 mcg at 06/15/19 0740 **AND** albuterol (PROVENTIL VENTOLIN) nebulizer solution 2.5 mg, 2.5 mg, Nebulization, Q6HWA RT, Belinda Irene NP, 2.5 mg at 06/15/19 0740   diphenhydrAMINE (BENADRYL) capsule 25 mg, 25 mg, Oral, Q4H PRN, Belinda Irene, NP Assessment:  
 
Principal Problem: 
  Symptomatic anemia (6/6/2019) Active Problems: 
  MDS (myelodysplastic syndrome) ([facility] [address] (12/17/2011) Overview: Procrit started in August, 2011 12/18/11 Procrit weekly and Iron stores. 5-12 12-13-12 good response to 3 weekly procrit did not need it last time 3-7-13 Pt doing well. Just wanted a \"check-up. \" Responding to Procrit  
    every three weeks. 8-29-13 patient has missed some injections on recently restarted  
    hemoglobin only issue , takes oral iron iron stores each time Essential hypertension, benign (1/20/2013) CKD (chronic kidney disease) stage 4, GFR 15-29 ml/min ([facility]) (7/10/2013) COPD (chronic obstructive pulmonary disease) ([facility] [address] (4/2/2014) Overview: HOME OXYGEN 3 LITERS 
 
  HLD (hyperlipidemia) () 
 
  S/P AVR (aortic valve replacement) () Overview: Mechanical/Artific Type 2 diabetes mellitus with nephropathy ([facility] [address] (12/18/2017) Systolic CHF, acute on chronic ([facility] [address] (12/31/2018) Hyperkalemia (6/6/2019) Right shoulder pain (6/6/2019) Plan:  
  
Acute on chronic anemia From acute blood loss and chronic illness. Procrit is given on 6/7. Monitor. Hb is decreased. Will give 1 unit of PRC transfusion due to MDS, presence of mechanical heart valve, with recent GI bleeding and history of CHF. GI bleeding Received PRC transfusion. EGD and colonoscopy with result above. Pending biopsy result. Mechanical AVR Resumed warfarin. Pharmacology is helping. Will stop IV heparin when INR is in therapeutic range. MDS Monitoring. Hyperkalemia Started on [place] initially. K is improved. Monitor. Hypernatremia Resolved and improved. History of systolic CHF Continue current medications. She appears well compensated. DVT prophylaxis : Warfarin due to presence of mechanical heart valve already I have discussed the plan of care with patient. Signed By: Veatrice Goodpasture, MD   
 Alaina 15, 2019

## 2019-06-15 NOTE — PROGRESS NOTES
Problem: Self Care Deficits Care Plan (Adult) Goal: *Acute Goals and Plan of Care (Insert Text) Description 1. Patient will complete upper body bathing and dressing with minimal assistance and adaptive equipment as needed. 2. Patient will complete toileting with minimal assistance. 3. Patient will tolerate 25 minutes of OT treatment with 2-3 rest breaks to increase activity tolerance for ADLs. 4. Patient will complete functional transfers with supervision and adaptive equipment as needed. 5. Patient will complete functional mobility for household distances with supervision and good safety awareness. Timeframe: 7 visits Outcome: Progressing Towards Goal 
  
OCCUPATIONAL THERAPY: Daily Note and PM  
 6/15/2019 INPATIENT: OT Visit Days: 1 [place] / [place] / Product Type: Medicare /  
  
NAME/AGE/GENDER: Matt Jurado is a 68 y.o. female PRIMARY DIAGNOSIS:  Symptomatic anemia [D64.9] Symptomatic anemia Symptomatic anemia Procedure(s) (LRB): ESOPHAGOGASTRODUODENOSCOPY (EGD) Hep drip stopped at 0600 (N/A) COLONOSCOPY/ BMI 40 ROOM 637 (N/A) ESOPHAGOGASTRODUODENAL (EGD) BIOPSY (N/A) ENDOSCOPIC POLYPECTOMY (N/A) 4 Days Post-Op ICD-10: Treatment Diagnosis:  
 · Pain in Right Shoulder (M25.511) · Stiffness of Right Shoulder, Not elsewhere classified (M25.611) · Generalized Muscle Weakness (M62.81) · Other lack of cordination (R27.8) · Repeated Falls (R29.6) · Dizziness and Giddiness (R42) Precautions/Allergies: 
   Ferrlecit [sodium ferric gluconat-sucrose]; Sulfa (sulfonamide antibiotics); and Aspirin ASSESSMENT:  
 
Ms. Mendel Rayas presents in supine upon arrival sleeping. Pt easily awakened and transferred to sitting with CGA. Pt stood with a rolling walker and CGA and completed functional mobility in the room and just out into the hallway and then returned to edge of bed.  Pt completed exercises listed in the following grid and then left edge of bed with RT administering a breathing treatment. Pt with very limited mobility in her R arm and crepitus noted. Good effort. Continue OT POC. This section established at most recent assessment PROBLEM LIST (Impairments causing functional limitations): 1. Decreased Strength 2. Decreased ADL/Functional Activities 3. Decreased Transfer Abilities 4. Decreased Ambulation Ability/Technique 5. Decreased Balance 6. Increased Pain 7. Decreased Activity Tolerance 8. Increased Fatigue 9. Decreased Flexibility/Joint Mobility 10. Decreased [place] with Home Exercise Program 
 INTERVENTIONS PLANNED: (Benefits and precautions of occupational therapy have been discussed with the patient.) 1. Activities of daily living training 2. Adaptive equipment training 3. Balance training 4. Clothing management 5. Donning&doffing training 6. Neuromuscular re-eduation 7. Therapeutic activity 8. Therapeutic exercise TREATMENT PLAN: Frequency/Duration: Follow patient 3 times per week to address above goals. Rehabilitation Potential For Stated Goals: Good REHAB RECOMMENDATIONS (at time of discharge pending progress):   
Placement: It is my opinion, based on this patient's performance to date, that Ms. Diaz may benefit from intensive therapy at a [address] [place] [address] after discharge due to the functional deficits listed above that are likely to improve with skilled rehabilitation and concerns that he/she may be unsafe to be unsupervised at home due to risk for future falls . Equipment: ? TBD   
    
 
 
 
OCCUPATIONAL PROFILE AND HISTORY:  
History of Present Injury/Illness (Reason for Referral): 
See H&P Past Medical History/Comorbidities: Ms. Diaz  has a past medical history of Anticoagulated on Coumadin (7/9/2013), CAD (coronary artery disease) (1/20/2013), Cardiomyopathy [facility]), CHF (congestive heart failure) ([facility] [address] (2/17/2017), CKD (chronic kidney disease) stage 3, GFR 30-59 ml/min ([facility]) (7/10/2013), COPD (chronic obstructive pulmonary disease) ([facility] [address] (2014), Diabetes ([facility] [address] Essential hypertension, benign (2013), HLD (hyperlipidemia), ICD (implantable cardioverter-defibrillator) in place (10/2/2014), Iron deficiency anemia due to chronic blood loss (2009), MDS (myelodysplastic syndrome) ([facility] [address] (2011), REJI (obstructive sleep apnea)-cpap (2014), Osteoarthritis, Osteopenia, Pulmonary hypertension ([facility] [address] (6/15/2016), Quadrantanopia, Skin defect (2018), and Visual complaint (2015). She also has no past medical history of Difficult intubation, Malignant hyperthermia due to anesthesia, Nausea & vomiting, Pseudocholinesterase deficiency, or Unspecified adverse effect of anesthesia. Ms. Dianelys Mcdowell  has a past surgical history that includes cardiac catheterization (); ir bx bone marrow diagnostic (2011); hx aortic valve replacement (, ); hx  section; hx tubal ligation; hx heart catheterization (); hx coronary stent placement (May, 2014); hx pacemaker (10/2/2014); hx endoscopy (2009); and colonoscopy (N/A, 2019). Social History/Living Environment:  
Home Environment: Apartment # Steps to Enter: 2 One/Two Story Residence: One story Living Alone: Yes Support Systems: Child(alysha) Patient Expects to be Discharged to[de-identified] [id number] Current DME Used/Available at Home: Oxygen, portable, Shower chair, Walker, rollator Tub or Shower Type: Tub/Shower combination Prior Level of Function/Work/Activity: 
Pt reports she lives alone but has a caregiver that comes in and helps with household chores and some with ADLs. Pt has meals delivered to her home. Pt has a rollator but states she doesn't use it. Pt's biggest limiting factor are the steps that come out of her home (2-3 steps) that pt reports she has repeatedly fallen down. Personal Factors:   
      Social Background:  Lives alone Past/Current Experience: multiple falls at home Other factors that influence how disability is experienced by the patient:  multiple co-morbidities Number of Personal Factors/Comorbidities that affect the Plan of Care: Extensive review of physical, cognitive, and psychosocial performance (3+):  HIGH COMPLEXITY ASSESSMENT OF OCCUPATIONAL PERFORMANCE[de-identified]  
Activities of Daily Living:  
Basic ADLs (From Assessment) Complex ADLs (From Assessment) Feeding: Stand-by assistance Oral Facial Hygiene/Grooming: Moderate assistance Bathing: Maximum assistance Upper Body Dressing: Maximum assistance Lower Body Dressing: Maximum assistance Toileting: Moderate assistance Grooming/Bathing/Dressing Activities of Daily Living Bed/Mat Mobility Supine to Sit: Minimum assistance Sit to Stand: Minimum assistance Stand to Sit: Contact guard assistance Most Recent Physical Functioning:  
Gross Assessment: 
  
         
  
Posture: 
Posture (WDL): Exceptions to [facility] Posture Assessment: Forward head, Rounded shoulders Balance: 
Sitting: Intact Standing: Impaired Standing - Static: Good Standing - Dynamic : Fair Bed Mobility: 
Supine to Sit: Minimum assistance Wheelchair Mobility: 
  
Transfers: 
Sit to Stand: Minimum assistance Stand to Sit: Contact guard assistance Patient Vitals for the past 6 hrs: 
 BP SpO2 O2 Flow Rate (L/min) Pulse 06/15/19 1132 114/71 98 %  72  
06/15/19 1445  94 % 3 l/min  Mental Status Neurologic State: Alert Orientation Level: Oriented X4 Cognition: Appropriate decision making, Appropriate for age attention/concentration, Appropriate safety awareness, Follows commands Perception: Appears intact Perseveration: No perseveration noted Safety/Judgement: Awareness of environment, Fall prevention, Home safety Physical Skills Involved: 1. Range of Motion 2. Balance 3. Strength 4. Activity Tolerance 5. Fine Motor Control 6. Gross Motor Control 7. Vision 8. Pain (acute) 9. Pain (Chronic) Cognitive Skills Affected (resulting in the inability to perform in a timely and safe manner): 1. none  Psychosocial Skills Affected: 1. Habits/Routines 2. Self-Awareness Number of elements that affect the Plan of Care: 5+:  HIGH COMPLEXITY CLINICAL DECISION MAKIN[address] [place] [address] AM-PAC 6 Clicks Daily Activity Inpatient Short Form How much help from another person does the patient currently need. .. Total A Lot A Little None 1. Putting on and taking off regular lower body clothing? ? 1   ? 2   ? 3   ? 4  
2. Bathing (including washing, rinsing, drying)? ? 1   ? 2   ? 3   ? 4  
3. Toileting, which includes using toilet, bedpan or urinal?   ? 1   ? 2   ? 3   ? 4  
4. Putting on and taking off regular upper body clothing? ? 1   ? 2   ? 3   ? 4  
5. Taking care of personal grooming such as brushing teeth? ? 1   ? 2   ? 3   ? 4  
6. Eating meals? ? 1   ? 2   ? 3   ? 4  
© , Trustees of [address] [place] [address], under license to [employer]. All rights reserved Score:  Initial: 13 Most Recent: X (Date: -- ) Interpretation of Tool:  Represents activities that are increasingly more difficult (i.e. Bed mobility, Transfers, Gait). Medical Necessity:    
· Patient demonstrates good ·  rehab potential due to higher previous functional level. Reason for Services/Other Comments: 
· Patient continues to require skilled intervention due to decreased independence with ADL/functional transfers · . Use of outcome tool(s) and clinical judgement create a POC that gives a: MODERATE COMPLEXITY  
 
 
 
TREATMENT:  
(In addition to Assessment/Re-Assessment sessions the following treatments were rendered) Pre-treatment Symptoms/Complaints:   
Pain: Initial:  
Pain Location 1: Arm, Head 
Pain Intervention(s) 1: Exercise(Pt stated she received Tylenol for her head)  Post Session:  same Therapeutic Activity: (15 minutes): Therapeutic activities including Chair transfers and static/dynamic standing to improve mobility, strength and balance. Required CGA to promote static and dynamic balance in standing. Therapeutic Exercise: (15 minutes):  Exercises per grid below to improve mobility, strength and dynamic movement of arm - bilateral to improve functional bending, lifting and reaching. Required minimal verbal and tactile cues to promote proper body posture and promote proper body mechanics. Progressed range and repetitions as indicated. Date: 
6/15/19 Date: 
 Date: Activity/Exercise Parameters Parameters Parameters Standing calf raises 10 reps Standing hip flexion/extension 10 reps Shoulder shrugs 10 rep Braces/Orthotics/Lines/Etc:  
· IV 
· O2 Device: Nasal cannula Treatment/Session Assessment:   
· Response to Treatment:  Pt tolerated well with good participation but limitations due to R UE pain. · Interdisciplinary Collaboration:  
o Certified Occupational Therapy Assistant 
o Registered Nurse · After treatment position/precautions:  
o Bed/Chair-wheels locked 
o Bed in low position 
o Call light within reach 
o RN notified 
o Side rails x 2 
o edge of bed · Compliance with Program/Exercises: Will assess as treatment progresses. · Recommendations/Intent for next treatment session: \"Next visit will focus on advancements to more challenging activities and reduction in assistance provided\". Total Treatment Duration: OT Patient Time In/Time Out Time In: 6650 Time Out: 9785 Randal Bass

## 2019-06-15 NOTE — PROGRESS NOTES
am 
6/15/2019 9:17 AM 
 
Admit Date: 6/6/2019 Admit Diagnosis: Symptomatic anemia [D64.9] Subjective:  
 Patient resting in bed doing well. On heparin drip and coumadin, INR 1.7. Will need to go home when INR theraputic. May need transfusion. Objective:  
  
Visit Vitals /55 (BP 1 Location: Left arm, BP Patient Position: At rest) Pulse 74 Temp 97.9 °F (36.6 °C) Resp 16 Ht 5' 2\" (1.575 m) Wt 95.9 kg (211 lb 8 oz) SpO2 97% BMI 38.68 kg/m² ROS:  General ROS: negative for - chills Hematological and Lymphatic ROS: negative for - blood clots or jaundice; oozing around right neck line site. Respiratory ROS: positive for - shortness of breath Cardiovascular ROS: no chest pain or dyspnea on exertion Gastrointestinal ROS: no abdominal pain, change in bowel habits, or black or bloody stools Neurological ROS: no TIA or stroke symptoms Physical Exam: 
 
Physical Examination: General appearance - alert, well appearing, and in no distress Mental status - alert, oriented to person, place, and time Eyes - pupils equal and reactive, extraocular eye movements intact Neck/lymph - supple, no significant adenopathy Chest/CV - clear to auscultation, no wheezes, rales or rhonchi, symmetric air entry Heart - normal rate, regular rhythm, normal S1, S2, no murmurs, rubs, clicks or gallops Abdomen/GI - soft, nontender, nondistended, no masses or organomegaly Musculoskeletal - no joint tenderness, deformity or swelling Extremities - peripheral pulses normal, no pedal edema, no clubbing or cyanosis Skin - normal coloration and turgor, no rashes, no suspicious skin lesions noted Current Facility-Administered Medications Medication Dose Route Frequency  warfarin (COUMADIN) tablet 2.5 mg  2.5 mg Oral QPM  
 lidocaine (XYLOCAINE) 10 mg/mL (1 %) injection 0.1 mL  0.1 mL SubCUTAneous PRN  
 0.9% sodium chloride infusion 250 mL  250 mL IntraVENous PRN  
  furosemide (LASIX) tablet 40 mg  40 mg Oral DAILY  polyethylene glycol (MIRALAX) packet 17 g  17 g Oral BID  patiromer calcium sorbitex (VELTASSA) powder 8.4 g  8.4 g Oral DAILY  ferrous gluconate 324 mg (38 mg iron) tablet 1 Tab  1 Tab Oral BID WITH MEALS  
 heparin 25,000 units in dextrose 500 mL infusion  12-25 Units/kg/hr IntraVENous TITRATE  pantoprazole (PROTONIX) 40 mg in sodium chloride 0.9% 10 mL injection  40 mg IntraVENous Q12H  
 albuterol (PROVENTIL VENTOLIN) nebulizer solution 2.5 mg  2.5 mg Nebulization Q4H PRN  
 carvedilol (COREG) tablet 6.25 mg  6.25 mg Oral BID WITH MEALS  sertraline (ZOLOFT) tablet 50 mg  50 mg Oral DAILY  simvastatin (ZOCOR) tablet 20 mg  20 mg Oral QHS  traZODone (DESYREL) tablet 50 mg  50 mg Oral QHS  vitamin E (AQUA GEMS) capsule 400 Units (Patient Supplied)  400 Units Oral DAILY  sodium chloride (NS) flush 5-40 mL  5-40 mL IntraVENous Q8H  
 sodium chloride (NS) flush 5-40 mL  5-40 mL IntraVENous PRN  
 acetaminophen (TYLENOL) tablet 650 mg  650 mg Oral Q4H PRN  
 HYDROcodone-acetaminophen (NORCO) 5-325 mg per tablet 1 Tab  1 Tab Oral Q4H PRN  
 naloxone (NARCAN) injection 0.4 mg  0.4 mg IntraVENous PRN  
 ondansetron (ZOFRAN) injection 4 mg  4 mg IntraVENous Q4H PRN  
 bisacodyl (DULCOLAX) tablet 10 mg  10 mg Oral DAILY PRN  
 0.9% sodium chloride infusion 250 mL  250 mL IntraVENous PRN  
 budesonide (PULMICORT) 500 mcg/2 ml nebulizer suspension  500 mcg Nebulization BID RT And  
 albuterol (PROVENTIL VENTOLIN) nebulizer solution 2.5 mg  2.5 mg Nebulization Q6HWA RT  
 diphenhydrAMINE (BENADRYL) capsule 25 mg  25 mg Oral Q4H PRN Data Review:  
@LABRCNT(Na,K,BUN,CREA,WBC,HGB,HCT,PLT,INR,TRP,TCHOL*,Triglyceride*,LDL*,LDLCPOC HDL*,HDL])@ TELEMETRY: SR with PVC Assessment/Plan:  
 
Principal Problem: 
  Symptomatic anemia (6/6/2019)- no further bleeding.  Serial H&H and transfuse per GI and primary team 
 
 Active Problems: 
  MDS (myelodysplastic syndrome) ([facility] (12/17/2011) Overview: Procrit started in August, 2011 12/18/11 Procrit weekly and Iron stores. 5-12 Per Hem/Onc and primary team 
 
  Essential hypertension, benign (1/20/2013)- The current medical regimen is effective;  continue present plan and medications. CKD (chronic kidney disease) stage 4, GFR 15-29 ml/min ([facility]) (7/10/2013)- The current medical regimen is effective;  continue present plan and medications. COPD (chronic obstructive pulmonary disease) ([facility] (4/2/2014) Overview: HOME OXYGEN 3 LITERS The current medical regimen is effective;  continue present plan and medications. HLD (hyperlipidemia) () The current medical regimen is effective;  continue present plan and medications. S/P AVR (aortic valve replacement) () Overview: Mechanical/Artific On Coumadin and heparin gtt bridge. INR currently 1.7 Type 2 diabetes mellitus with nephropathy ([facility] (12/18/2017) - The current medical regimen is effective;  continue present plan and medications. Systolic CHF, acute on chronic ([facility]) (12/31/2018)The current medical regimen is effective;  continue present plan and medications. Hyperkalemia (6/6/2019)- stable today at 4.9. The current medical regimen is effective;  continue present plan and medications. GI plan to bring pt back for repeat EGD with EMR of duodenal adenoma in 1-2 months. On coumadin and heparin gtt bridge. INR 1.7 today; check INR tomorrow. Possible discontinue heparin drip tomorrow if INR is 2 or greater. Call if needed Montserrat Smalls MD

## 2019-06-15 NOTE — PROGRESS NOTES
06/14/19 2230 Oxygen Therapy O2 Sat (%) 92 % Pulse via Oximetry 67 beats per minute O2 Device BIPAP  
O2 Flow Rate (L/min) 3 l/min CPAP/BIPAP  
CPAP/BIPAP Start/Stop On Device Mode BIPAP;S/T Mask Type and Size Full face Skin Condition intact PIP Observed 24.4 cm H20 Vt Spont (ml) 459 ml Ve Observed (l/min) 6 l/min Total RR (Spontaneous) 15 breaths per minute Leak (Estimated) 48 L/min Pt placed on home Trilogy unit with 3L bleed-in.

## 2019-06-15 NOTE — PROGRESS NOTES
Hourly rounds completed. All needs met. Pt alert and oriented x 3. Towards the end of the shift pt stated she needs blood. She says she knows when she needs blood. Latest hgb 7.8. Previous hgb was 8.0 No other complaints stated. Pt is resting in bed in a low, locked position with the side rails up x 2, NC on, call light within reach. Will give report to the oncoming day shift nurse.

## 2019-06-15 NOTE — PROGRESS NOTES
Hgb is now 8.4, Per MD hold blood transfusion until next hgb check in the am. Will reevaluate if needed then.

## 2019-06-15 NOTE — PROGRESS NOTES
Patient in great spirits Family at bedside  knows her very well Shared a moment of laughter Stephanie Mobley, staff , [address], [id number] [facility] Jason  /   [email or web address]

## 2019-06-16 NOTE — PROGRESS NOTES
Hourly rounds performed this shift. Pt has been sleeping on and off this shift, but sit up on the side of the bed to eat lunch. Pt  Increase in dyspnea notice today while pt is at resting position and with exertion. All needs have been met.

## 2019-06-16 NOTE — PROGRESS NOTES
Hourly rounds performed during this shift; all needs met at this time. Bed in low/locked position and call light, personal items within reach. Bedside shift report given to oncoming day shift nurse.

## 2019-06-16 NOTE — PROGRESS NOTES
Problem: Breathing Pattern - Ineffective Goal: *Absence of hypoxia Outcome: Progressing Towards Goal 
Goal: *Use of effective breathing techniques Outcome: Progressing Towards Goal 
Goal: *PALLIATIVE CARE:  Alleviation of Dyspnea Outcome: Progressing Towards Goal

## 2019-06-16 NOTE — PROGRESS NOTES
Warfarin dosing per pharmacist 
 
Marilee Marr Molly Reed is a 68 y.o. female. Height: 5' 2\" (157.5 cm)    Weight: 96.7 kg (213 lb 1.6 oz) Indication: mechanical AVR Goal INR:  2-3 Home dose:  5 mg Mon; 2.5 mg all other days Risk factors or significant drug interactions:  none Other anticoagulants:  Heparin gtt Daily Monitoring Date  INR     Warfarin dose HGB              Notes 6/10  1.7  ---  8.9 
6/11  ---  ---  8.8 
6/12  1.6  5 mg  8.5 
6/13  1.5  2.5 mg  8.4 6/14  1.7  2.5 mg  8.1 6/15  1.7/1.9  5 mg  7.8 
6/16  2  2.5 mg  --- Pharmacy is consulted to manage warfarin for Ms. Corral during this admission. She was admitted with an INR of 3.6 and GI bleed. She had an EGD which revealed duodenal mass. Warfarin held until 6/12. INR up to 2 after increased dose. Will resume PTA dosing and give 2.5 mg tonight. Consideration for 2.5 mg vs. 5 mg dose tomorrow based on INR trend. Daily INR. Pharmacy will continue to follow. Please call with any questions. Thank you, Mauricio Brian, PharmD Clinical Pharmacist 
[phone]

## 2019-06-16 NOTE — PROGRESS NOTES
06/15/19 2226 Oxygen Therapy O2 Sat (%) 92 % Pulse via Oximetry 71 beats per minute O2 Device BIPAP  
O2 Flow Rate (L/min) 3 l/min CPAP/BIPAP  
CPAP/BIPAP Start/Stop On Device Mode BIPAP;S/T Mask Type and Size Full face Skin Condition intact PIP Observed 21.4 cm H20 Vt Spont (ml) 402 ml Ve Observed (l/min) 16 l/min Total RR (Spontaneous) 20 breaths per minute Leak (Estimated) 46 L/min  
Pt's Home Machine Yes (type/vendor) (Trilogy) Pt placed on home Trilogy unit with 3L bleed-in.

## 2019-06-16 NOTE — PROGRESS NOTES
Writer took phone message from lab of the following critical value: PTT =187.8 repeated and verified. Received from Syd Medina. Recorded in doc flow sheets. Called to MD Denney.  
Reported to Thu Null RN. Primary RN.

## 2019-06-16 NOTE — PROGRESS NOTES
Olddsg removed from Left knee, area soft and red but no heat or pain per pt. Cleansed with wound cleanser and dried. New dressing applied.

## 2019-06-16 NOTE — PROGRESS NOTES
Heparin stopped and will be held for an hour. Will recheck PTT once Hep is restarted and increased per protocol. Assisted pt up on the side of bed to eat lunch.

## 2019-06-17 NOTE — PROGRESS NOTES
Problem: Mobility Impaired (Adult and Pediatric) Goal: *Acute Goals and Plan of Care (Insert Text) Description LTG: 
(1.)Ms. Diaz will move from supine to sit and sit to supine  in bed with MODIFIED INDEPENDENCE within 5 treatment day(s). (2.)Ms. Diaz will transfer from bed to chair and chair to bed with MODIFIED INDEPENDENCE using the least restrictive device within 5 treatment day(s). (3.)Ms. Diaz will ambulate with MODIFIED INDEPENDENCE for 50 feet with the least restrictive device within 5 treatment day(s). ________________________________________________________________________________________________ Outcome: Progressing Towards Goal 
 
 
PHYSICAL THERAPY: Daily Note and PM 6/17/2019 INPATIENT: PT Visit Days : 3 [place] / [place] / Product Type: Medicare /   
  
NAME/AGE/GENDER: Ze Hanley is a 68 y.o. female PRIMARY DIAGNOSIS: Symptomatic anemia [D64.9] Symptomatic anemia Symptomatic anemia Procedure(s) (LRB): ESOPHAGOGASTRODUODENOSCOPY (EGD) Hep drip stopped at 0600 (N/A) COLONOSCOPY/ BMI 40 ROOM 637 (N/A) ESOPHAGOGASTRODUODENAL (EGD) BIOPSY (N/A) ENDOSCOPIC POLYPECTOMY (N/A) 6 Days Post-Op ICD-10: Treatment Diagnosis:  
 · Generalized Muscle Weakness (M62.81) · Difficulty in walking, Not elsewhere classified (R26.2) · Repeated Falls (R29.6) Precaution/Allergies: 
Ferrlecit [sodium ferric gluconat-sucrose]; Sulfa (sulfonamide antibiotics); and Aspirin ASSESSMENT:  
 
Ms. Diaz presents sitting in bedside chair and is agreeable to PT. Patient reports 3 falls in the last 1-2 months. She states she lives independently, is on home O2, has a rollator but doesn't use it. Patient was sitting up in recliner chair upon contact and agreeable to PT. Patient transfers to standing with min assist and cues for hand placement.  Once standing patient ambulates 25' with rolling walker, CGA and cues for sequencing with rolling walker. Patient returns to recliner chair where she then participates in therapeutic strengthening exercises to improve functional strength for transfers, gait and overall mobility. Patient requires cues to perform exercises correctly. Patient needed rest breaks throughout exercises. Overall slow progress towards physical therapy goals. Patient's goals listed above are still appropriate. Will continue skilled PT to address remaining deficits. This section established at most recent assessment PROBLEM LIST (Impairments causing functional limitations): 1. Decreased Strength 2. Decreased ADL/Functional Activities 3. Decreased Transfer Abilities 4. Decreased Ambulation Ability/Technique 5. Decreased Balance 6. Increased Pain 7. Decreased Activity Tolerance 8. Decreased Cunningham with Home Exercise Program 
 INTERVENTIONS PLANNED: (Benefits and precautions of physical therapy have been discussed with the patient.) 1. Balance Exercise 2. Family Education 3. Gait Training 4. Group Therapy 5. Home Exercise Program (HEP) 6. Therapeutic Activites 7. Therapeutic Exercise/Strengthening 8. Transfer Training TREATMENT PLAN: Frequency/Duration: 3 times a week for duration of hospital stay Rehabilitation Potential For Stated Goals: Excellent REHAB RECOMMENDATIONS (at time of discharge pending progress):   
Placement: It is my opinion, based on this patient's performance to date, that Ms. Tylor Rodrigues may benefit from participating in 1-2 additional therapy sessions in order to continue to assess for rehab potential and then make recommendation for disposition at discharge. Equipment: ? To be determined after AD trial   
    
 
 
 
HISTORY:  
History of Present Injury/Illness (Reason for Referral): 
Pt is a 67 yo female with PMH significant for myelodisplastic syndrome, DM2, CKD stage 4, HTN, sHF, HLD, COPD, AVR on chronic coumadin.   Pt presented to the ED via EMS with report of fatigue. Pt reports being too weak to get up this am.  Pt also states that she has missed her recent iron injections. Pt initially denied any falls but did recall a fall which injured her right arm/shoulder. She denies fever, CP, sob. She denies any n/v/d/c. Pt denies seeing any blood in her stool. On admit it was found that pt hgb 7.1, K+ also 7.1, Cr 3.71, . Stool was FOB positive. CXR negative, EKG LBBB, axis deviation to the left. Ulysses Phenes ordered not yet obtained. Pt getting calcium gluconate, D50 and insulin - will then recheck potassium. 10 systems reviewed and negative except as noted in HPI. Past Medical History/Comorbidities: Ms. Saira Rice  has a past medical history of Anticoagulated on Coumadin (2013), CAD (coronary artery disease) (2013), Cardiomyopathy ([address] CHF (congestive heart failure) ([address] (2017), CKD (chronic kidney disease) stage 3, GFR 30-59 ml/min ([facility]) (7/10/2013), COPD (chronic obstructive pulmonary disease) ([address] (2014), Diabetes ([address] Essential hypertension, benign (2013), HLD (hyperlipidemia), ICD (implantable cardioverter-defibrillator) in place (10/2/2014), Iron deficiency anemia due to chronic blood loss (2009), MDS (myelodysplastic syndrome) ([address] (2011), REJI (obstructive sleep apnea)-cpap (2014), Osteoarthritis, Osteopenia, Pulmonary hypertension ([address] (6/15/2016), Quadrantanopia, Skin defect (2018), and Visual complaint (2015). She also has no past medical history of Difficult intubation, Malignant hyperthermia due to anesthesia, Nausea & vomiting, Pseudocholinesterase deficiency, or Unspecified adverse effect of anesthesia.   Ms. Saira Rice  has a past surgical history that includes cardiac catheterization (); ir bx bone marrow diagnostic (2011); hx aortic valve replacement (, ); hx  section; hx tubal ligation; hx heart catheterization (2013); hx coronary stent placement (May, 2014); hx pacemaker (10/2/2014); hx endoscopy (7/2009); and colonoscopy (N/A, 6/11/2019). Social History/Living Environment:  
Home Environment: Apartment # Steps to Enter: 2 One/Two Story Residence: One story Living Alone: Yes Support Systems: Child(alysha) Patient Expects to be Discharged to[de-identified] [facility] Current DME Used/Available at Home: Oxygen, portable, Shower chair, Walker, rollator Tub or Shower Type: Tub/Shower combination Prior Level of Function/Work/Activity: 
Modified independent to independent at baseline. Doesn't drive. Has a rollator. Number of Personal Factors/Comorbidities that affect the Plan of Care: 1-2: MODERATE COMPLEXITY EXAMINATION:  
Most Recent Physical Functioning:  
Gross Assessment: 
  
         
  
Posture: 
  
Balance: 
Sitting: Intact Standing: Impaired Standing - Static: Good Standing - Dynamic : Fair Bed Mobility: 
  
Wheelchair Mobility: 
  
Transfers: 
Sit to Stand: Minimum assistance Stand to Sit: Contact guard assistance Gait: 
  
Base of Support: Widened Speed/Winifred: Slow;Shuffled Step Length: Left shortened;Right shortened Gait Abnormalities: Decreased step clearance;Trunk sway increased; Path deviations; Shuffling gait Distance (ft): 25 Feet (ft) Assistive Device: Walker, rolling Ambulation - Level of Assistance: Contact guard assistance Interventions: Safety awareness training; Tactile cues; Verbal cues Body Structures Involved: 1. Bones 2. Joints 3. Muscles 4. Ligaments Body Functions Affected: 1. Neuromusculoskeletal 
2. Movement Related Activities and Participation Affected: 1. Mobility 2. Self Care 3. Domestic Life 4. Interpersonal Interactions and Relationships Number of elements that affect the Plan of Care: 4+: HIGH COMPLEXITY CLINICAL PRESENTATION:  
Presentation: Stable and uncomplicated: LOW COMPLEXITY CLINICAL DECISION MAKING:  
 [facility] Basic Mobility Inpatient Short Form How much difficulty does the patient currently have. .. Unable A Lot A Little None 1. Turning over in bed (including adjusting bedclothes, sheets and blankets)? ? 1   ? 2   ? 3   ? 4  
2. Sitting down on and standing up from a chair with arms ( e.g., wheelchair, bedside commode, etc.)   ? 1   ? 2   ? 3   ? 4  
3. Moving from lying on back to sitting on the side of the bed?   ? 1   ? 2   ? 3   ? 4 How much help from another person does the patient currently need. .. Total A Lot A Little None 4. Moving to and from a bed to a chair (including a wheelchair)? ? 1   ? 2   ? 3   ? 4  
5. Need to walk in hospital room? ? 1   ? 2   ? 3   ? 4  
6. Climbing 3-5 steps with a railing? ? 1   ? 2   ? 3   ? 4  
© 2007, Trustees of [address] [place] [address], under license to [employer]. All rights reserved Score:  Initial: 18 Most Recent: X (Date: -- ) Interpretation of Tool:  Represents activities that are increasingly more difficult (i.e. Bed mobility, Transfers, Gait). Medical Necessity:    
· Patient demonstrates excellent ·  rehab potential due to higher previous functional level. Reason for Services/Other Comments: 
· Patient continues to require modification of therapeutic interventions to increase complexity of exercises · . Use of outcome tool(s) and clinical judgement create a POC that gives a: Clear prediction of patient's progress: LOW COMPLEXITY  
  
 
 
 
TREATMENT:  
(In addition to Assessment/Re-Assessment sessions the following treatments were rendered) Pre-treatment Symptoms/Complaints:  No complaints of pain 
Pain: Initial:  
Pain Intensity 1: 0  Post Session:  0/10 Therapeutic Activity: (    15 minutes):   Therapeutic activities including bed mobility training, transfer training, ambulation on level ground, static/dynamic sitting/standing balance training, posture training, instruction in pursed lipped breathing, instruction in sequencing with rolling walker, scooting, and patient eudcation to improve mobility, strength and balance. Required minimal Safety awareness training; Tactile cues; Verbal cues to promote dynamic balance in standing. Therapeutic Exercise: (  10 Minutes):  Exercises per grid below to improve mobility, strength and balance. Required moderate visual, verbal and tactile cues to promote proper body alignment, promote proper body posture and promote proper body mechanics. Progressed range, repetitions and complexity of movement as indicated. Date: 
6/13/19 Date: 
6/17/19 Date: 
  
ACTIVITY/EXERCISE AM PM AM PM AM PM  
Ambulation:           Distance Device Duration Seated Heel Raises x10B A  2x15B A Seated Toe Raises x10B A  2x15B A Seated Long Arc Quads x10B A  2x15B A Seated Marching x10B A  2x10B A Seated Hip Abduction x10B A  2x10B A     
        
B = bilateral; AA = active assistive; A = active; P = passive Braces/Orthotics/Lines/Etc:  
· IV 
· O2 Device: Nasal cannula Treatment/Session Assessment:   
· Response to Treatment:  See above · Interdisciplinary Collaboration:  
o Registered Nurse · After treatment position/precautions:  
o Up in chair 
o Bed/Chair-wheels locked 
o Call light within reach 
o RN notified · Compliance with Program/Exercises: Will assess as treatment progresses · Recommendations/Intent for next treatment session: \"Next visit will focus on advancements to more challenging activities and reduction in assistance provided\". Total Treatment Duration: PT Patient Time In/Time Out Time In: 0129 Time Out: 8021 Leoncio Lazo, CRYS

## 2019-06-17 NOTE — PROGRESS NOTES
Warfarin dosing per pharmacist 
 
Aline Son Swathi Mata is a 68 y.o. female. Height: 5' 2\" (157.5 cm)    Weight: 96.6 kg (213 lb) Indication: mechanical AVR Goal INR:  2-3 Home dose:  5 mg Mon; 2.5 mg all other days Risk factors or significant drug interactions:  none Other anticoagulants:  Heparin gtt Daily Monitoring Date  INR     Warfarin dose HGB              Notes 6/10  1.7  ---  8.9 
6/11  ---  ---  8.8 
6/12  1.6  5 mg  8.5 
6/13  1.5  2.5 mg  8.4 6/14  1.7  2.5 mg  8.1 6/15  1.7/1.9  5 mg  7.8 
6/16  2  2.5 mg  --- 
6/17  2.2  5 mg  7.8 Pharmacy is consulted to manage warfarin for Ms. Corral during this admission. She was admitted with an INR of 3.6 and GI bleed. She had an EGD which revealed duodenal mass. Warfarin held until 6/12. INR up to 2.2 today. Heparin drip bridge therapy stopped by hospitalist. 
 
Will give usual home dose of 5 mg this evening. Continue daily INR for now. Pharmacy will continue to follow. Please call with any questions. Thank you, 
Ganesh Worrell, PharmD Clinical Pharmacist 
[phone]

## 2019-06-17 NOTE — PROGRESS NOTES
Problem: Falls - Risk of 
Goal: *Absence of Falls Description Document Domonique Waters Fall Risk and appropriate interventions in the flowsheet. Outcome: Progressing Towards Goal 
  
Problem: Breathing Pattern - Ineffective Goal: *Absence of hypoxia Outcome: Progressing Towards Goal 
  
Problem: Nutrition Deficit Goal: *Optimize nutritional status Outcome: Progressing Towards Goal

## 2019-06-17 NOTE — PROGRESS NOTES
Progress Note Patient: Ze Hanley MRN: [id number]  SSN: [id number] YOB: 1950  Age: 68 y.o. Sex: female Admit Date: 6/6/2019 LOS: 11 days Subjective:  
 
PMH significant for myelodisplastic syndrome, DM2, CKD stage 4, HTN, sHF, HLD, COPD, AVR on chronic coumadin. Admitted due to fatigue and found to be anemia more than her baseline and FELIZ. positive fecal occult blood. Patient is for EGD and colonoscopy on 6-. She was found to have duodenal mass with ulceration, that was biopsied. Also with nodular gastritis, internal hemorrhoids, signoid diverticulitis and polyp. Patient is feeling headache today. No nausea. No vomiting. Objective:  
 
Vitals:  
 06/17/19 4610 06/17/19 0550 06/17/19 0745 06/17/19 1341 BP: 120/64 131/58 116/66 Pulse: 72 72 72 Resp: 18 18 18 Temp: 97.5 °F (36.4 °C) 98.1 °F (36.7 °C) SpO2: 91% 100% 100% 97% Weight:  96.6 kg (213 lb) Height:      
  
 
Intake and Output: 
Current Shift: No intake/output data recorded. Last three shifts: 06/15 1901 - 06/17 0700 In: 240 [P.O.:240] Out: 1900 [[id number]] Physical Exam:  
 
General:                    The patient is a pleasant elderly female in no acute distress. She is lying flat. Head:                                   Normocephalic/atraumatic. Eyes:                                   palpebral pallor, no scleral icterus. ENT:                                    External auricular and nasal exam within normal limits. Mucous membranes are moist. 
Neck:                                   Supple, non-tender, no JVD. Lungs:                       Clear to auscultation bilaterally without wheezes or crackles. No respiratory distress or accessory muscle use. Heart:                                  Regular rate and rhythm, without murmurs, rubs, or gallops. Abdomen:                  Soft, non-tender, non-distended with normoactive bowel sounds. Genitourinary:           No tenderness over the bladder or bilateral CVAs. Extremities:               Without clubbing, cyanosis, or edema. Skin:                                    Normal color, texture, and turgor. No rashes, lesions, or jaundice. Pulses:                      Radial and dorsalis pedis pulses present 2+ bilaterally. Capillary refill <2s. Neurologic:                CN II-XII grossly intact and symmetrical.  
                                            Moving all four extremities well with no focal deficits. Psychiatric:                Pleasant demeanor, appropriate affect. Alert and oriented x 3 Lab/Data Review: 
 
Recent Results (from the past 24 hour(s)) PROTHROMBIN TIME + INR Collection Time: 06/16/19 11:21 AM  
Result Value Ref Range Prothrombin time 22.1 (H) 11.7 - 14.5 sec INR 2.0 GLUCOSE, POC Collection Time: 06/16/19  3:38 PM  
Result Value Ref Range Glucose (POC) 154 (H) 65 - 100 mg/dL PTT Collection Time: 06/16/19  7:36 PM  
Result Value Ref Range aPTT 79.2 (H) 24.7 - 39.8 SEC  
GLUCOSE, POC Collection Time: 06/16/19  8:56 PM  
Result Value Ref Range Glucose (POC) 169 (H) 65 - 100 mg/dL CBC WITH AUTOMATED DIFF Collection Time: 06/17/19  3:34 AM  
Result Value Ref Range WBC 5.9 4.3 - 11.1 K/uL  
 RBC 2.71 (L) 4.05 - 5.2 M/uL HGB 7.8 (L) 11.7 - 15.4 g/dL HCT 25.9 (L) 35.8 - 46.3 % MCV 95.6 79.6 - 97.8 FL  
 MCH 28.8 26.1 - 32.9 PG  
 MCHC 30.1 (L) 31.4 - 35.0 g/dL  
 RDW 15.9 (H) 11.9 - 14.6 % PLATELET 157 (L) 575 - 450 K/uL MPV 11.3 9.4 - 12.3 FL ABSOLUTE NRBC 0.00 0.0 - 0.2 K/uL  
 DF AUTOMATED NEUTROPHILS 72 43 - 78 % LYMPHOCYTES 15 13 - 44 % MONOCYTES 10 4.0 - 12.0 % EOSINOPHILS 2 0.5 - 7.8 % BASOPHILS 0 0.0 - 2.0 % IMMATURE GRANULOCYTES 0 0.0 - 5.0 % ABS. NEUTROPHILS 4.2 1.7 - 8.2 K/UL  
 ABS. LYMPHOCYTES 0.9 0.5 - 4.6 K/UL  
 ABS. MONOCYTES 0.6 0.1 - 1.3 K/UL  
 ABS. EOSINOPHILS 0.1 0.0 - 0.8 K/UL  
 ABS. BASOPHILS 0.0 0.0 - 0.2 K/UL  
 ABS. IMM. GRANS. 0.0 0.0 - 0.5 K/UL METABOLIC PANEL, BASIC Collection Time: 06/17/19  3:34 AM  
Result Value Ref Range Sodium 139 136 - 145 mmol/L Potassium 5.0 3.5 - 5.1 mmol/L Chloride 100 98 - 107 mmol/L  
 CO2 37 (H) 21 - 32 mmol/L Anion gap 2 (L) 7 - 16 mmol/L Glucose 104 (H) 65 - 100 mg/dL BUN 47 (H) 8 - 23 MG/DL Creatinine 1.91 (H) 0.6 - 1.0 MG/DL  
 GFR est AA 33 (L) >60 ml/min/1.73m2 GFR est non-AA 28 (L) >60 ml/min/1.73m2 Calcium 9.5 8.3 - 10.4 MG/DL PROTHROMBIN TIME + INR Collection Time: 06/17/19  3:34 AM  
Result Value Ref Range Prothrombin time 24.3 (H) 11.7 - 14.5 sec INR 2.2 PTT Collection Time: 06/17/19  3:34 AM  
Result Value Ref Range aPTT 81.7 (H) 24.7 - 39.8 SEC  
GLUCOSE, POC Collection Time: 06/17/19  7:19 AM  
Result Value Ref Range Glucose (POC) 111 (H) 65 - 100 mg/dL Current Facility-Administered Medications:  
  0.9% sodium chloride infusion 250 mL, 250 mL, IntraVENous, PRN, Cipriano Correa MD 
  warfarin (COUMADIN) tablet 2.5 mg, 2.5 mg, Oral, QPM, Tanya Monroe, DO, 2.5 mg at 06/16/19 1807   lidocaine (XYLOCAINE) 10 mg/mL (1 %) injection 0.1 mL, 0.1 mL, SubCUTAneous, PRN, Paulina Pratik Batista MD 
  furosemide (LASIX) tablet 40 mg, 40 mg, Oral, DAILY, Tanya Monroe, DO, 40 mg at 06/17/19 8830   polyethylene glycol (MIRALAX) packet 17 g, 17 g, Oral, BID, Shanice Shelley MD, 17 g at 06/17/19 0434   patiromer calcium sorbitex (VELTASSA) powder 8.4 g, 8.4 g, Oral, DAILY, Vanessa Uribe MD, 8.4 g at 06/16/19 1757   ferrous gluconate 324 mg (38 mg iron) tablet 1 Tab, 1 Tab, Oral, BID WITH MEALS, Tanya Monroe DO, 1 Tab at 06/17/19 0740   pantoprazole (PROTONIX) 40 mg in sodium chloride 0.9% 10 mL injection, 40 mg, IntraVENous, Q12H, Tanya Monroe, DO, 40 mg at 06/17/19 0741 
  albuterol (PROVENTIL VENTOLIN) nebulizer solution 2.5 mg, 2.5 mg, Nebulization, Q4H PRN, Belinda Irene, NP 
  carvedilol (COREG) tablet 6.25 mg, 6.25 mg, Oral, BID WITH MEALS, Naomyit, Belinda, NP, 6.25 mg at 06/17/19 0740   sertraline (ZOLOFT) tablet 50 mg, 50 mg, Oral, DAILY, Balotisit, Belinda, NP, 50 mg at 06/17/19 0800 
  simvastatin (ZOCOR) tablet 20 mg, 20 mg, Oral, QHS, Naomyit, Bernardah, NP, 20 mg at 06/16/19 2133   traZODone (DESYREL) tablet 50 mg, 50 mg, Oral, QHS, Chrisingit, kah, NP, 50 mg at 06/16/19 2133   vitamin E (AQUA GEMS) capsule 400 Units (Patient Supplied), 400 Units, Oral, DAILY, Belinda Irene, NP 
  sodium chloride (NS) flush 5-40 mL, 5-40 mL, IntraVENous, Q8H, Belinda Irene, [place], 10 mL at 06/17/19 0610 
  sodium chloride (NS) flush 5-40 mL, 5-40 mL, IntraVENous, PRN, Bernarda Ireneh, NP 
  acetaminophen (TYLENOL) tablet 650 mg, 650 mg, Oral, Q4H PRN, Belinda Irene, NP, 650 mg at 06/17/19 0148 
  HYDROcodone-acetaminophen (NORCO) 5-325 mg per tablet 1 Tab, 1 Tab, Oral, Q4H PRN, Belinda Irene, NP 
  naloxone (NARCAN) injection 0.4 mg, 0.4 mg, IntraVENous, PRN, Belinda Irene, NP 
  ondansetron (ZOFRAN) injection 4 mg, 4 mg, IntraVENous, Q4H PRN, Belinda Irene, NP 
  bisacodyl (DULCOLAX) tablet 10 mg, 10 mg, Oral, DAILY PRN, Belinda Irene NP 
  budesonide (PULMICORT) 500 mcg/2 ml nebulizer suspension, 500 mcg, Nebulization, BID RT, 500 mcg at 06/17/19 0837 **AND** albuterol (PROVENTIL VENTOLIN) nebulizer solution 2.5 mg, 2.5 mg, Nebulization, Q6HWA RT, Belinda Irene NP, 2.5 mg at 06/17/19 8579   diphenhydrAMINE (BENADRYL) capsule 25 mg, 25 mg, Oral, Q4H PRN, Belinda Irene NP Assessment:  
 
Principal Problem: 
  Symptomatic anemia (6/6/2019) Active Problems: 
  MDS (myelodysplastic syndrome) ([facility] (12/17/2011) Overview: Procrit started in August, 2011 12/18/11 Procrit weekly and Iron stores. 5-12 12-13-12 good response to 3 weekly procrit did not need it last time 3-7-13 Pt doing well. Just wanted a \"check-up. \" Responding to Procrit  
    every three weeks. 8-29-13 patient has missed some injections on recently restarted  
    hemoglobin only issue , takes oral iron iron stores each time Essential hypertension, benign (1/20/2013) CKD (chronic kidney disease) stage 4, GFR 15-29 ml/min ([facility]) (7/10/2013) COPD (chronic obstructive pulmonary disease) ([address] (4/2/2014) Overview: HOME OXYGEN 3 LITERS 
 
  HLD (hyperlipidemia) () 
 
  S/P AVR (aortic valve replacement) () Overview: Mechanical/Artific Type 2 diabetes mellitus with nephropathy ([facility] (12/18/2017) Systolic CHF, acute on chronic ([facility] (12/31/2018) Hyperkalemia (6/6/2019) Right shoulder pain (6/6/2019) Plan:  
  
Acute on chronic anemia From acute blood loss and chronic illness. Procrit is given on 6/7. Monitor. GI bleeding Received PRC transfusion. EGD and colonoscopy with result above. Mechanical AVR Resumed warfarin. Pharmacology is helping. Will stop IV heparin when INR is in therapeutic range. INR is 2.2 today. MDS Monitoring. Hyperkalemia Started on [place] initially. K is improved. Monitor. Hypernatremia Resolved and improved. History of systolic CHF Continue current medications. She appears well compensated. \ Headache No sign of neurodeficit. Will give symptomatic treatments. Monitor. DVT prophylaxis : Warfarin due to presence of mechanical heart valve already I have discussed the plan of care with patient. Signed By: Rik Capps MD   
 June 17, 2019

## 2019-06-17 NOTE — PROGRESS NOTES
Attempted to awaken pt to eat her lunch, pt open her eyes and states that she does not want her lunch. Pt currently have her Trilogy on,sleeping well.

## 2019-06-17 NOTE — PROGRESS NOTES
Date 06/16/19 0700 - 06/17/19 2980 06/17/19 0700 - 06/18/19 0406 Shift [id number] 1900-0659 24 Hour Total 7749-4336 1036-9109 24 Hour Total  
INTAKE  
P.O. 240  240     
  P. O. 240  240 Shift Total(mL/kg) 240(2.5)  240(2.5) OUTPUT Urine(mL/kg/hr) 900(0.8) 550 1450 Urine Occurrence(s) 4 x  4 x Urine Output (mL) (External Female Catheter 06/12/19)  Emesis/NG output Emesis Occurrence(s) 0 x  0 x Stool Stool Occurrence(s) 1 x  1 x Shift Total(mL/kg) 900(9.3) 550(5.7) 1450(15) NET -660 -550 -1210 Weight (kg) 96.7 96.6 96.6 96.6 96.6 96.6 Hourly rounds done. Pt c/o headache, medicated per MAR. Denies nausea, vomiting. Hep gtt running at 7 units/kg at therapeutic rate x2. PTT check daily. Remains on 3 L O2. All needs met at this time.

## 2019-06-17 NOTE — PROGRESS NOTES
Hourly rounds performed this shift, pt sitting in the chair alert and oriented talking with daughter. Pt states that her head is still hurting but not as bad as earlier. Pt daughter states that pt takes tramadol 50mg prn usually at home. All needs have been met call light within reach.

## 2019-06-18 NOTE — PROGRESS NOTES
Problem: Falls - Risk of 
Goal: *Absence of Falls Description Document Domonique Waters Fall Risk and appropriate interventions in the flowsheet. Outcome: Progressing Towards Goal 
  
Problem: Breathing Pattern - Ineffective Goal: *Absence of hypoxia Outcome: Progressing Towards Goal 
Goal: *Use of effective breathing techniques Outcome: Progressing Towards Goal 
Goal: *PALLIATIVE CARE:  Alleviation of Dyspnea Outcome: Progressing Towards Goal

## 2019-06-18 NOTE — DISCHARGE SUMMARY
Discharge Summary Patient: Erin Giron MRN: [id number]  SSN: [id number] YOB: 1950  Age: 68 y.o. Sex: female Admit Date: 6/6/2019 Discharge Date: 6/18/2019 Admission Diagnoses: Symptomatic anemia [D64.9] Discharge Diagnoses:  
Problem List as of 6/18/2019 Date Reviewed: 4/9/2019 Codes Class Noted - Resolved * (Principal) Symptomatic anemia ICD-10-CM: D64.9 ICD-9-CM: 285.9  6/6/2019 - Present Hyperkalemia ICD-10-CM: E87.5 ICD-9-CM: 276.7  6/6/2019 - Present Right shoulder pain ICD-10-CM: M25.511 ICD-9-CM: 719.41  6/6/2019 - Present Excessive granulation tissue ICD-10-CM: L92.9 ICD-9-CM: 701.5  1/24/2019 - Present Severe obesity ([address] ICD-10-CM: E66.01 
ICD-9-CM: 278.01  1/15/2019 - Present Skin defect ICD-10-CM: L98.9 ICD-9-CM: 709.8  1/10/2019 - Present Systolic CHF, acute on chronic (HCC) ICD-10-CM: S06.38 ICD-9-CM: 428.23, 428.0  12/31/2018 - Present Acute on chronic combined systolic and diastolic CHF (congestive heart failure) (HCC) ICD-10-CM: I50.43 ICD-9-CM: 428.43, 428.0  12/31/2018 - Present Acute exacerbation of CHF (congestive heart failure) (HCC) ICD-10-CM: I50.9 ICD-9-CM: 428.0  11/14/2018 - Present Debility ICD-10-CM: R53.81 ICD-9-CM: 799.3  9/8/2018 - Present Anemia (Chronic) ICD-10-CM: D64.9 ICD-9-CM: 285.9  9/7/2018 - Present Chronic respiratory failure with hypoxia [facility]) ICD-10-CM: J96.11 
ICD-9-CM: 518.83, 799.02  9/7/2018 - Present Encounter for immunization ICD-10-CM: M57 ICD-9-CM: V03.89  8/21/2018 - Present Physical debility (Chronic) ICD-10-CM: R53.81 ICD-9-CM: 799.3  5/2/2018 - Present Closed nondisplaced fracture of shaft of fifth metacarpal bone of left hand ICD-10-CM: R49.816B ICD-9-CM: 815.03  4/28/2018 - Present  Type 2 diabetes mellitus with nephropathy (HCC) (Chronic) ICD-10-CM: E11.21 
 ICD-9-CM: 250.40, 583.81  12/18/2017 - Present S/P AVR (aortic valve replacement) (Chronic) ICD-10-CM: Z95.2 ICD-9-CM: V43.3  Unknown - Present Overview Signed 2/23/2016 11:04 AM by Riri Briones Mechanical/Artific Cardiomyopathy ([address] (Chronic) ICD-10-CM: I42.9 ICD-9-CM: 425.4  Unknown - Present Osteopenia ICD-10-CM: M85.80 ICD-9-CM: 733.90  Unknown - Present HLD (hyperlipidemia) (Chronic) ICD-10-CM: E96.0 ICD-9-CM: 272.4  Unknown - Present Osteoarthritis ICD-10-CM: M19.90 ICD-9-CM: 715.90  Unknown - Present  
   
 ICD (implantable cardioverter-defibrillator) in place ICD-10-CM: Z95.810 ICD-9-CM: V45.02  10/2/2014 - Present Overview Signed 10/2/2014  4:58 PM by Loraine Ritchieroniblaise single-chamber ICD implantation 10/20/14 COPD (chronic obstructive pulmonary disease) (HCC) (Chronic) ICD-10-CM: J44.9 ICD-9-CM: 856  4/2/2014 - Present Overview Signed 10/6/2018  8:56 PM by Jacinto Ayala NP  
  HOME OXYGEN 3 LITERS 
  
  
   
 REJI (obstructive sleep apnea)-cpap (Chronic) ICD-10-CM: R34.21 
ICD-9-CM: 327.23  4/2/2014 - Present CKD (chronic kidney disease) stage 4, GFR 15-29 ml/min ([facility]) ICD-10-CM: N18.4 ICD-9-CM: 585.4  7/10/2013 - Present Anticoagulated on Coumadin (Chronic) ICD-10-CM: Z79.01 
ICD-9-CM: V58.61  7/9/2013 - Present Overview Signed 7/9/2013  4:16 PM by Suzie Hoover NP  
  S/P AVR 
  
  
   
 CAD (coronary artery disease) (Chronic) ICD-10-CM: I25.10 ICD-9-CM: 414.00  1/20/2013 - Present Overview Signed 5/20/2014 10:05 AM by Jami Hollins NP  
  5/8/14 PCI LAD with stent placed Chronic combined systolic and diastolic heart failure (HCC) (Chronic) ICD-10-CM: I50.42 
ICD-9-CM: 428.42  1/20/2013 - Present Overview Addendum 4/28/2018  4:53 AM by Corey Magallanes MD  
  5/8/14 ECHO:  EF 10-15% 12/2017:  EF 25-30% Essential hypertension, benign (Chronic) ICD-10-CM: I10 
ICD-9-CM: 401.1  1/20/2013 - Present MDS (myelodysplastic syndrome) (HCC) (Chronic) ICD-10-CM: D46.9 ICD-9-CM: 238.75  12/17/2011 - Present Overview Addendum 8/29/2013 11:06 AM by Phoenix Jaquez Procrit started in August, 2011 12/18/11 Procrit weekly and Iron stores. 5-12 12-13-12 good response to 3 weekly procrit did not need it last time 3-7-13 Pt doing well. Just wanted a \"check-up. \" Responding to Procrit every three weeks. 8-29-13 patient has missed some injections on recently restarted hemoglobin only issue , takes oral iron iron stores each time Iron deficiency anemia due to chronic blood loss (Chronic) ICD-10-CM: D50.0 ICD-9-CM: 280.0  7/29/2009 - Present RESOLVED: SOB (shortness of breath) ICD-10-CM: R06.02 
ICD-9-CM: 786.05  11/14/2018 - 11/20/2018 RESOLVED: History of incision and drainage ICD-10-CM: Z98.890 ICD-9-CM: V45.89  10/6/2018 - 12/18/2018 RESOLVED: Coagulopathy ([facility] INR >4 on admission 9/7/18 ICD-10-CM: D68.9 ICD-9-CM: 286.9  9/8/2018 - 10/3/2018 RESOLVED: Traumatic hematoma of left knee (Chronic) ICD-10-CM: C70.07RE ICD-9-CM: 924.11  9/8/2018 - 12/18/2018 RESOLVED: Acute kidney injury superimposed on chronic kidney disease ([facility] ICD-10-CM: N17.9, N18.9 ICD-9-CM: 866.00, 585.9  9/7/2018 - 10/3/2018 RESOLVED: Obesity, morbid ([facility] (Chronic) ICD-10-CM: E66.01 
ICD-9-CM: 278.01  6/8/2018 - 12/18/2018 RESOLVED: Long term (current) use of anticoagulants (Chronic) ICD-10-CM: Z79.01 
ICD-9-CM: V58.61  4/19/2018 - 12/18/2018 RESOLVED: Routine general medical examination at a health care facility ICD-10-CM: Z00.00 ICD-9-CM: V70.0  12/16/2016 - 4/27/2018 RESOLVED: Pulmonary hypertension (HCC) (Chronic) ICD-10-CM: I27.20 ICD-9-CM: 416.8  6/15/2016 - 10/24/2018 Discharge Condition: Stable Hospital Course: Patient was admitted on 6-6-2019 due to symptomatic anemia. History of MDS and iron deficiency anemia. Patient with positive blood in stool. Patient received PRC transfusion. Warfarin was held since INR was supratheurapeutic. Patient was on heparin drip bride while being investigated since she has mechanical heart valve. She was seen by GI and had EGD and colonoscopy on 6-. She was found to have duodenal mass with ulceration, that was biopsied. Also with nodular gastritis, internal hemorrhoids, signoid diverticulitis and polyp. She had FELIZ and hyperkalemia as well. She was seen by nephrology and Javier Mehreen was started. Patient has been stable. No bleeding. Resumed on Warfarin and stable to be discharged to a short-term rehabilitation facility. Physical Exam:  
  
General:                    The patient is a pleasant elderly female in no acute distress. She is sitting up and eating breakfast.  
Head:                                   Normocephalic/atraumatic. Eyes:                                   palpebral pallor, no scleral icterus. ENT:                                    External auricular and nasal exam within normal limits.                                             DQMBRG membranes are moist. 
Neck:                                   Supple, non-tender, no JVD. Lungs:                       Clear to auscultation bilaterally without wheezes or crackles.                                             No respiratory distress or accessory muscle use. Heart:                                  Regular rate and rhythm, without murmurs, rubs, or gallops. Abdomen:                  Soft, non-tender, non-distended with normoactive bowel sounds. Genitourinary:           No tenderness over the bladder or bilateral CVAs. Extremities:               Without clubbing, cyanosis, or edema. Skin:                                    Normal color, texture, and turgor. No rashes, lesions, or jaundice. Pulses:                      Radial and dorsalis pedis pulses present 2+ bilaterally.  
                                            Capillary refill <2s. Neurologic:                CN II-XII grossly intact and symmetrical.  
                                            Moving all four extremities well with no focal deficits. Psychiatric:                Pleasant demeanor, appropriate affect. Alert and oriented x 3 
  
 
 
 
Consults: Cardiology, Gastroenterology, Hematology/Oncology and Nephrology Significant Diagnostic Studies:  
 
Recent Results (from the past 24 hour(s)) GLUCOSE, POC Collection Time: 06/17/19 11:24 AM  
Result Value Ref Range Glucose (POC) 114 (H) 65 - 100 mg/dL GLUCOSE, POC Collection Time: 06/17/19  4:59 PM  
Result Value Ref Range Glucose (POC) 118 (H) 65 - 100 mg/dL CBC WITH AUTOMATED DIFF Collection Time: 06/18/19  6:11 AM  
Result Value Ref Range WBC 5.5 4.3 - 11.1 K/uL  
 RBC 2.74 (L) 4.05 - 5.2 M/uL HGB 7.8 (L) 11.7 - 15.4 g/dL HCT 26.5 (L) 35.8 - 46.3 % MCV 96.7 79.6 - 97.8 FL  
 MCH 28.5 26.1 - 32.9 PG  
 MCHC 29.4 (L) 31.4 - 35.0 g/dL  
 RDW 16.0 (H) 11.9 - 14.6 % PLATELET 269 (L) 156 - 450 K/uL MPV 10.4 9.4 - 12.3 FL ABSOLUTE NRBC 0.00 0.0 - 0.2 K/uL  
 DF AUTOMATED NEUTROPHILS 66 43 - 78 % LYMPHOCYTES 20 13 - 44 % MONOCYTES 11 4.0 - 12.0 % EOSINOPHILS 2 0.5 - 7.8 % BASOPHILS 0 0.0 - 2.0 % IMMATURE GRANULOCYTES 0 0.0 - 5.0 %  
 ABS. NEUTROPHILS 3.7 1.7 - 8.2 K/UL  
 ABS. LYMPHOCYTES 1.1 0.5 - 4.6 K/UL  
 ABS. MONOCYTES 0.6 0.1 - 1.3 K/UL  
 ABS. EOSINOPHILS 0.1 0.0 - 0.8 K/UL  
 ABS. BASOPHILS 0.0 0.0 - 0.2 K/UL  
 ABS. IMM. GRANS. 0.0 0.0 - 0.5 K/UL METABOLIC PANEL, BASIC Collection Time: 06/18/19  6:11 AM  
Result Value Ref Range Sodium 141 136 - 145 mmol/L Potassium 5.3 (H) 3.5 - 5.1 mmol/L Chloride 102 98 - 107 mmol/L  
 CO2 38 (H) 21 - 32 mmol/L  Anion gap 1 (L) 7 - 16 mmol/L  
 Glucose 88 65 - 100 mg/dL BUN 48 (H) 8 - 23 MG/DL Creatinine 2.11 (H) 0.6 - 1.0 MG/DL  
 GFR est AA 30 (L) >60 ml/min/1.73m2 GFR est non-AA 25 (L) >60 ml/min/1.73m2 Calcium 9.8 8.3 - 10.4 MG/DL PROTHROMBIN TIME + INR Collection Time: 06/18/19  6:11 AM  
Result Value Ref Range Prothrombin time 25.8 (H) 11.7 - 14.5 sec INR 2.4 GLUCOSE, POC Collection Time: 06/18/19  7:25 AM  
Result Value Ref Range Glucose (POC) 102 (H) 65 - 100 mg/dL XR chest  
6-7-2019 IMPRESSION: 
  
Persistent cardiomegaly with no acute abnormality  
  
Atherosclerosis Disposition: short-term rehabilitation facility Discharge Medications:  
Current Discharge Medication List  
  
START taking these medications Details  
patiromer calcium sorbitex (VELTASSA) 8.4 gram powder Take 8.4 g by mouth daily for 15 days. Qty: 15 Packet, Refills: 0  
  
pantoprazole (PROTONIX) 40 mg tablet Take 1 Tab by mouth daily for 30 days. Qty: 30 Tab, Refills: 0 CONTINUE these medications which have NOT CHANGED Details  
furosemide (LASIX) 40 mg tablet TAKE 1 TABLET BY MOUTH EVERY DAY Qty: 30 Tab, Refills: 6 Associated Diagnoses: Essential hypertension, benign  
  
traZODone (DESYREL) 50 mg tablet TAKE 1/2-1 TABS BY MOUTH NIGHTLY. Qty: 60 Tab, Refills: 2 Associated Diagnoses: REJI (obstructive sleep apnea)  
  
carvedilol (COREG) 6.25 mg tablet Take 1 Tab by mouth two (2) times daily (with meals). Qty: 60 Tab, Refills: 3 Associated Diagnoses: Anemia, unspecified type; Acute kidney injury superimposed on chronic kidney disease ([address] Coronary artery disease involving native coronary artery of native heart without angina pectoris; Chronic combined systolic and diastolic heart failure ([address] Chronic respiratory failure with hypoxia ([address] Debility; MDS (myelodysplastic syndrome) ([address]  Type 2 diabetes mellitus with nephropathy ([address] Essential hypertension, benign; Anticoagulated on Coumadin; CKD (chronic kidney disease) stage 3, GFR 30-59 ml/min ([facility] Chronic obstructive pulmonary disease, unspecified COPD type ([facility] REJI (obstructive sleep apnea); Diabetes mellitus type 2, diet-controlled ([facility] ICD (implantable cardioverter-defibrillator) in place; S/P AVR (aortic valve replacement); Obesity, morbid ([facility] Coagulopathy ([facility] Traumatic hematoma of left knee, sequela; Iron deficiency anemia due to chronic blood loss; Osteopenia, unspecified location; Mixed hyperlipidemia; Osteoarthritis of shoulder, unspecified laterality, unspecified osteoarthritis type; Cardiomyopathy, unspecified type ([facility] Pulmonary hypertension ([facility] Dysthymia; Long term (current) use of anticoagulants; Subdural hematoma ([facility] Closed nondisplaced fracture of shaft of fifth metacarpal bone of left hand, sequela; Physical debility; Encounter for immunization  
  
warfarin (COUMADIN) 5 mg tablet  2.5 mg orally daily. sertraline (ZOLOFT) 50 mg tablet Take 1 Tab by mouth daily. Qty: 30 Tab, Refills: 4 Associated Diagnoses: Anxiety  
  
simvastatin (ZOCOR) 20 mg tablet Take 1 Tab by mouth nightly. Qty: 90 Tab, Refills: 3  
  
fluticasone-vilanterol (BREO ELLIPTA) 200-25 mcg/dose inhaler Take 1 Puff by inhalation daily. Qty: 1 Inhaler, Refills: 11  
  
OTHER Trilogy  
  
mometasone-formoterol (DULERA) 200-5 mcg/actuation HFA inhaler 2 puffs bid, rinse mouth after use. Qty: 3 Inhaler, Refills: 3 Associated Diagnoses: Pulmonary hypertension ([facility] Chronic respiratory failure with hypoxia (HCC) aspirin delayed-release 81 mg tablet Take 81 mg by mouth daily. docusate sodium (COLACE) 50 mg capsule Take 50 mg by mouth daily. ferrous gluconate 324 mg (38 mg iron) tablet TAKE 1 TAB BY MOUTH DAILY (BEFORE BREAKFAST). INDICATIONS: IRON DEFICIENCY ANEMIA Qty: 90 Tab, Refills: 3 Associated Diagnoses: Iron deficiency anemia due to chronic blood loss fluticasone (FLONASE) 50 mcg/actuation nasal spray 2 Sprays by Both Nostrils route daily as needed. Qty: 3 Bottle, Refills: 3  
  
loratadine (CLARITIN) 10 mg tablet TAKE 1 TAB BY MOUTH DAILY. Qty: 30 Tab, Refills: 1 Associated Diagnoses: Upper respiratory tract infection, unspecified type  
  
albuterol (PROVENTIL VENTOLIN) 2.5 mg /3 mL (0.083 %) nebulizer solution 3 mL by Nebulization route every four (4) hours as needed for Wheezing. Qty: 120 Each, Refills: 11  
 Associated Diagnoses: Pulmonary hypertension ([address] Chronic respiratory failure with hypoxia (HCC)  
  
albuterol (VENTOLIN HFA) 90 mcg/actuation inhaler 2 puffs qid prn 
Qty: 1 Inhaler, Refills: 11  
 Associated Diagnoses: Pulmonary hypertension ([address] Chronic respiratory failure with hypoxia (HCC) OXYGEN-AIR DELIVERY SYSTEMS 3 L by Nasal route continuous. STOP taking these medications  
  
 calcium carbonate (CALCIUM 600 PO) Comments:  
Reason for Stopping:   
   
 vitamin E (AQUA GEMS) 400 unit capsule Comments:  
Reason for Stopping:   
   
 traMADol (ULTRAM) 50 mg tablet Comments:  
Reason for Stopping:   
   
 cholecalciferol, vitamin D3, (VITAMIN D3) 2,000 unit tab Comments:  
Reason for Stopping:   
   
 spironolactone (ALDACTONE) 25 mg tablet Comments:  
Reason for Stopping:   
   
 nitroglycerin (NITROSTAT) 0.4 mg SL tablet Comments:  
Reason for Stopping:   
   
 sacubitril-valsartan (ENTRESTO) 24 mg/26 mg tablet Comments:  
Reason for Stopping:   
   
 isosorbide mononitrate ER (IMDUR) 30 mg tablet Comments:  
Reason for Stopping:   
   
 multivit-minerals/folic acid (ADULT ONE DAILY MULTIVITAMIN PO) Comments:  
Reason for Stopping:   
   
 ascorbic acid, vitamin C, (VITAMIN C) 500 mg tablet Comments:  
Reason for Stopping:   
   
 acetaminophen (TYLENOL) 325 mg tablet Comments:  
Reason for Stopping:   
   
  
 
 
Activity: Activity as tolerated Diet: Cardiac Diet Wound Care: Keep wound clean and dry Follow-up Appointments Procedures  FOLLOW UP VISIT Appointment in: Other (Specify) Please make HFU with Dr Ryan Gaitan in 3 weeks with labs (CBC/CMP,transferrin saturation, ferritin) and infusion (Procrit). Cancel procrit appt on 6/11. [phone] Please make HFU with Dr Ryan Gaitan in 3 weeks with labs (CBC/CMP,transferrin saturation, ferritin) and infusion (Procrit). Cancel procrit appt on 6/11. 
 
[phone] Standing Status:   Standing Number of Occurrences:   1 Order Specific Question:   Appointment in Answer: Other (Specify)  FOLLOW UP VISIT Appointment in: Other (Specify) See your primary doctor in 3-5 days. See GI as per GI appointment in 1-2 weeks. See cardiology in 1-2 weeks. See your primary doctor in 3-5 days. See GI as per GI appointment in 1-2 weeks. See cardiology in 1-2 weeks. Standing Status:   Standing Number of Occurrences:   1 Standing Expiration Date:   6/19/2019 Order Specific Question:   Appointment in Answer: Other (Specify) I have discussed the plan of care with patient. Time spent on discharge is 41 minutes. Signed By: Rik Capps MD   
 June 18, 2019

## 2019-06-18 NOTE — PROGRESS NOTES
Pt to DC to [facility] today. Room 106W and report line [phone] given to RN. [facility] to transport-1:30pm. 
 
Care Management Interventions PCP Verified by CM: Yes Mode of Transport at Discharge: Other (see comment) Transition of Care Consult (CM Consult): Discharge Planning Discharge Durable Medical Equipment: No 
Physical Therapy Consult: Yes Occupational Therapy Consult: Yes Current Support Network: Own Home Confirm Follow Up Transport: Family Plan discussed with Pt/Family/Caregiver: Yes Freedom of Choice Offered: Yes Discharge Location Discharge Placement: Skilled nursing facility

## 2019-06-18 NOTE — PROGRESS NOTES
.. TRANSFER - OUT REPORT: 
 
Verbal report given to DEWAYNE Delatorre  on [employer]  being transferred to [facility]. Report consisted of patients Situation, Background, Assessment and  
Recommendations(SBAR). Information from the following repor was reviewed with the receiving nurse. Lines:    
 
Opportunity for questions and clarification was provided. Patient transported with: [facility]

## 2019-06-18 NOTE — PROGRESS NOTES
Problem: Falls - Risk of 
Goal: *Absence of Falls Description Document Jonndmoise Howard Fall Risk and appropriate interventions in the flowsheet. Outcome: Progressing Towards Goal 
  
Problem: Patient Education: Go to Patient Education Activity Goal: Patient/Family Education Outcome: Progressing Towards Goal

## 2019-06-18 NOTE — PROGRESS NOTES
Pt complaint of a headache relieved by PRN Norco per MAR. Hourly rounds completed on patient. All needs are met. Bed locked in and in low position. Call light within reach. Will report to oncoming nurse at bedside.

## 2019-06-19 NOTE — PROGRESS NOTES
This note will not be viewable in [address] Patient discharged to [facility] on 6/18/19. Patient discharged to a [facility] facility. Patient will be included in weekly care coordination calls. Information forwarded to Racheal Holden RN, [facility] RN Care Manager.

## 2019-06-21 NOTE — PROGRESS NOTES
Community Care Team documentation for patient in [facility] The information below provided by:Juan Miguel PT Update: bed mobility and functional transfers CGA, ambulates FWW Min A 50 feet, UB adls CGA and LB Min A, Toileting skills Min A-probably be here about 2 to 3 weeks Nursing Update:no acute nsg issues noted; on O2 at 3L Discharge Date:TBD Assign to Indy Lunsford 32 Radiology Follow-up

## 2019-06-27 NOTE — PROGRESS NOTES
Arrived to the [facility]. Type and cross and Two units of PRBC's administered completed. Patient tolerated well. Any issues or concerns during appointment: none. Patient aware of next infusion appointment on 07.02.2019 (date) at 56 (time).  
Discharged to [address] [place] [address] ([facility]) EMS to be transported to home with oxygen via NC at 2 liters per patient request.

## 2019-06-27 NOTE — PROGRESS NOTES
Community Care Team documentation for patient in [facility]    The information below provided by:[facility]    PT Update: bed mobility and functional transfers CGA, ambulation Min A with FWW  100 feet, fatigues easily, UB adls set up and LB Min A to CGA, toileting  CGA        Nursing Update:6/23 order for hemoccult stools r/t HGB 7.6, scheduled for blood transfusion 6/27;        Discharge Date:TBD      Assign to Indy Lunsford

## 2019-07-02 NOTE — PROGRESS NOTES
Hgb 9.5 today. Retacrit 60,000 units SQ to abdomen given per order. Patient discharged via w/c accompanied by staff. Instructed to notify physician of any problems, questions or concerns after discharge. Next appointment is 07/23/19 at 1330 with Cain with labs prior.

## 2019-07-10 NOTE — PROGRESS NOTES
Community Care Team documentation for patient in [facility] The information below provided by: PT Update: Mod A with bed mobility and Max A for transfers, unable to ambulate, Min A with UB adls and Max A with LB adls, toileting skills Max A Nursing Update:has EGD scheduled for 7/19, Skin tear 7/4/19 receiving TX as ordered, no dc date at this time. Plans to go home with assistance from aides. Discharge Date:TBD Assign to [facility] Care Manager: Maggy Dooley

## 2019-07-19 NOTE — PROGRESS NOTES
Community Care Team documentation for patient in [facility]    The information below provided by:[facility]    PT Update: Noncompliant with cpap at night, maintains stats in the 80s at baseline         Nursing Update:has EGD scheduled for 7/19, Skin tear 7/4/19 receiving TX as ordered, no dc date at this time. Plans to go home with assistance from aides.        Discharge Date:7/23/19 Interim HH      Assign to [address]

## 2019-07-24 NOTE — PROGRESS NOTES
Transition of Care Discharge Follow-up Questionnaire   Date/Time of Call:   7/24/19    What was the patient hospitalized for? Anemia Hx of GI bleed   Does the patient understand his/her diagnosis and/or treatment and what happened during the hospitalization? States understanding   Did the patient receive discharge instructions? Yes   CM Assessed Risk for Readmission:       Patient stated Risk for Readmission:      Moderate related to age, PMH      Not asked   Review any discharge instructions (see discharge instructions/AVS in [facility]). Ask patient if they understand these. Do they have any questions? Discharged from [facility] following IP stay  Activity as tolerated  Cardiac diet   Were home services ordered (nursing, PT, OT, ST, etc.)?  SF   If so, has the first visit occurred? If not, why? (Assist with coordination of services if necessary.)   No, scheduled, called yesterday   Was any DME ordered? None   If so, has it been received? If not, why?  (Assist patient in obtaining DME orders &/or equipment if necessary.) N/A   Complete a review of all medications (new, continued and discontinued meds per the D/C instructions and medication tab in [facility]). Start: Protonix 40mg daily             Stop:Calcium, Vit E, Tramadol, Vit. D, Aldactone, NTG tab, Entresto, Imdur, multi vit, Vit. C, Tylenol  Changed: none  Continue: all other home meds       Were all new prescriptions filled? If not, why?  (Assist patient in obtaining medications if necessary  escalate for CCM &/or SW if ongoing issues are verbalized by pt or anticipated)   Daughter has taken to pharmacy and will pick-up today. Reviewed stopping above medications; she states understanding. Does the patient understand the purpose and dosing instructions for all medications?   (If patient has questions, provide explanation and education.)      Does the patient have any problems in performing ADLs? (If patient is unable to perform ADLs  what is the limiting factor(s)? Do they have a support system that can assist? If no support system is present, discuss possible assistance that they may be able to obtain. Escalate for CCM/SW if ongoing issues are verbalized by pt or anticipated)   Legally blind. Lives alone but has in-home aide M-F during the day. She reports all needs met, she is comfortable with the assistance level she currently has. Has Moms Meals: delivery of meals and groceries weekly. Daughter assists as needed. Does the patient have all follow-up appointments scheduled? 7 day f/up with PCP?   (f/up with PCP may be w/in 14 days if patient has a f/up with their specialist w/in 7 days)    7-14 day f/up with specialist?   (or per discharge instructions)    If f/up has not been made  what actions has the care coordinator made to accomplish this? Has transportation been arranged? PCP 7/30/19 3:45pm  Has endoscopy scheduled for 8/2/19   Any other questions or concerns expressed by the patient? No concerns at this point. Will follow for in-home care needs. Schedule next appointment with VENECIA WOODWARD Coordinator or refer to RN Case Manager/ per the workflow guidelines. When is care coordinators next follow-up call scheduled? If referred for CCM  what RN care manager was the referral assigned? Follow-up call within 14 days.    YASMINE Call Completed By: Jeri Cowan RN

## 2019-07-25 NOTE — PROGRESS NOTES
Community Care Team documentation for patient in [facility]    The information below provided by:[facility]    PT Update:   Discharged      Nursing Update:Medically stable      Discharge Date:7/23/19 w/ Interim HH      Assign to [address] [place] [address]

## 2019-07-28 PROBLEM — I50.9 CHF EXACERBATION (HCC): Status: ACTIVE | Noted: 2019-01-01

## 2019-07-28 NOTE — ED NOTES
Patient's O2 sat dropped to 82% while patient was sitting up with family. There was no deviation in waveform on the monitor. Once again, the patient's nasal cannula was increased to 5L until O2 sat increased to 97%. This RN notified MD and also had respiratory therapy to check out patient.

## 2019-07-28 NOTE — ED TRIAGE NOTES
Patient presents with daughter. Daughter states patient is supposed to use a walker to ambulate but does not always. Patient was found this afternoon face up, sideways, on the bed. Daughter states they assume syncopal episode, patient has a history of falls. Patient does not recall incident. Daughter brought patient because she is lethargic, patient was able to eat a small meal PTA. Patient denies pain or injuries. Patient on 3L NC at all times for COPD.  Daughter states hx of HTN, took medication just PTA with meal.

## 2019-07-28 NOTE — ED NOTES
Patient's O2 sat dropped to 75% while this RN used straight catheter to obtain urine sample. Patient's nasal cannula was turned up to 5L and patient's HOB was raised to 45 degrees. Patient's O2 sat returned to the 90s and patient's nasal cannula was dropped back down to 3L. MD notified.

## 2019-07-29 PROBLEM — J96.92 HYPERCAPNIC RESPIRATORY FAILURE (HCC): Status: ACTIVE | Noted: 2019-01-01

## 2019-07-29 NOTE — PROGRESS NOTES
Spoke to Ms. Diaz and her daughter Ms. Narinder Barnes, RN in room 201 about Case Management and discharge planning. Ms. Diaz lives alone in a apartment (zero or one step up, depending on entrance) in [place], [place]      She has a [place] Medicaid CLTC aide Monday through Saturday for about 3 hours each of those days. The aide helps with ADLs, food prep, cleaning, and transportation. Ms. Diaz has a rollator, and also a WC. Ms. Diaz was at [facility] for STR for about 3 or 4 weeks. She got out on July 23, 2019, and [facility] 13. OT, PT, and SLP started home health care. Ms. Diaz uses supplemental oxygen at 3 lpm NC, and non-invasive positive pressure therapy (Trilogy) at night, with a 3 lpm supplemental oxygen bleed-in. She knows that she is supposed to do a daily weight related to CHF management, but states that she could not stand up to weigh herself. Discussed discharged options. Right now, plan is home and resume [facility] OT, PT, and SLP, and add home health RN services. Backup plan is more STR at a SNF, with possible long-term Medicaid placement. Daughter Cayden Adkins, and her two older sisters to also discuss discharge planning. Case management to follow and assist with discharge planning. Care Management Interventions  PCP Verified by CM: Yes  Transition of Care Consult (CM Consult): [address]: Yes  Discharge Durable Medical Equipment: No  Physical Therapy Consult: Yes  Occupational Therapy Consult: Yes  Current Support Network: Has Personal Caregivers, Lives Alone, Other  Confirm Follow Up Transport: Other (see comment)  Plan discussed with Pt/Family/Caregiver:  Yes

## 2019-07-29 NOTE — PROGRESS NOTES
Chart review: patient readmitted with CHF exacerbation In respiratory acidosis. Still anemic. Was schedule for EGD and endo; now postponed. BNP 1354. Troponin I elevated 0.08    Transition Care Nurse, [facility] : [facility] notified with request to follow while IP.

## 2019-07-29 NOTE — PROGRESS NOTES
END OF SHIFT NOTE:    INTAKE/OUTPUT  No intake/output data recorded. Voiding: YES  Catheter: NO  Drain:              Flatus: Patient does have flatus present. Stool:  0 occurrences. Characteristics:       Emesis: 0 occurrences. Characteristics:        VITAL SIGNS  Patient Vitals for the past 12 hrs:   Temp Pulse Resp BP SpO2   07/29/19 0312 97.6 °F (36.4 °C) 68 20 120/76 95 %   07/28/19 2133     96 %   07/28/19 2115 98.4 °F (36.9 °C) 72 15 129/67 96 %   07/28/19 2059  70 14 136/62 91 %   07/28/19 1958  70 14 117/58 99 %   07/28/19 1952  71      07/28/19 1859  74 15 121/59 94 %   07/28/19 1829  73 13 126/56 93 %   07/28/19 1759  75 14 125/60 94 %   07/28/19 1746  78   (!) 77 %   07/28/19 1729  75 13 105/52 94 %   07/28/19 1638 98.6 °F (37 °C) 76 18 (!) 153/108 99 %       Pain Assessment  Pain Intensity 1: 0 (07/28/19 1638)        Patient Stated Pain Goal: 0    Ambulating  No  Slept long periods. Trilogy on . Incontinent large amts urine after Lasix. No coumadin given this shift due to pharmacy needing to wait on am labs. Shift report given to oncoming nurse at the bedside.     Madelyn Marshall RN

## 2019-07-29 NOTE — CONSULTS
[facility] Consult Note       Primary GI Physician: Dr Beck Spring    Referring Provider:  Tony Freitas NP    Consult Date:  7/29/2019    Admit Date:  7/28/2019    Chief Complaint:  Anemia, Duodenal Mass w Ulceration     Subjective:     History of Present Illness:  Patient is a 68 y.o. female with PMH including but not limited to CKD IV, COPD, myelodysplasic syndrome w anemia, HTN, HLD, CAD w cardiac arrest, mechanical AVR on Coumadin with ECHO Nov 2018 w EF 25% w severe hypokinesis and MR s/p Biotronik ICD, who is seen in consultation at the request of Tony Freitas for above. She underwent EGD/COLO June 11, 2019 w findings of IH, tics, colon polyps, gastritis and duodenal mass w ulceration which was thought to be sources of pt anemia. Duodenal mass pathology came back as adenomatous polyp. She was to be admitted to [facility] for [address] window due to not being able to hold Coumadin for longer than 3 days per Dr Betzaida Duong. She was scheduled for EGD w EMR on 8/2/19 at [facility]. She was admitted 7/28 after a suspected syncopal episode or fall at home. Her family found patient lethargic on the bed, staring off into space and not very responsive. She was still breathing. Usually uses O2 at 3 L. . Patient does not recall any event. She also had increased swelling to her legs. Workup in ED w BNP of 1354 and chest xray with worsening mild interstitial edema. Head CT was negative. ABG with respiratory acidosis with a pH of 7.211 and CO2 99.5. PO2 is 70. She is being treated with Lasix and is on 6 liters w mask (Trilogy). She feels SOB is improving. Cardiology is also following and IV diuretics. Hgb stable at 8.2, INR today 2.2,  plt slightly low at 117k. She reports tarry stool night before last w none since.   Denies abd pain, N/V.    PMH:  Past Medical History:   Diagnosis Date    Anticoagulated on Coumadin 7/9/2013    S/P AVR     CAD (coronary artery disease) 1/20/2013 5/8/14 PCI LAD with stent placed     [facility]     CHF (congestive heart failure) ([facility] 2017    CKD (chronic kidney disease) stage 3, GFR 30-59 ml/min ([facility]) 7/10/2013    COPD (chronic obstructive pulmonary disease) ([facility] 2014    Diabetes ([facility]     Essential hypertension, benign 2013    HLD (hyperlipidemia)     ICD (implantable cardioverter-defibrillator) in place 10/2/2014    Biotronik single-chamber ICD implantation 10/20/14     Iron deficiency anemia due to chronic blood loss 2009    MDS (myelodysplastic syndrome) ([facility] 2011    Procrit started in 11 Procrit weekly and Iron stores. 12 good response to 3 weekly procrit did not need it last time  3-13 Pt doing well. Just wanted a \"check-up. \" Responding to Procrit every three weeks. 13 patient has missed some injections on recently restarted hemoglobin only issue , takes oral iron iron stores each time       REJI (obstructive sleep apnea)-cpap 2014    Osteoarthritis     Osteopenia     Pulmonary hypertension ([facility] 6/15/2016    Quadrantanopia     Skin defect 2018    Visual complaint 2015       PSH:  Past Surgical History:   Procedure Laterality Date    CARDIAC CATHETERIZATION      COLONOSCOPY N/A 2019    COLONOSCOPY/ BMI 40 ROOM 637 performed by Artemio Manning MD at [address] [facility]    mechanical valve     HX  SECTION      HX CORONARY STENT PLACEMENT  May, 2014    STEMI with Stent placement.  HX ENDOSCOPY  2009    EGD    HX HEART CATHETERIZATION      HX PACEMAKER  10/2/2014    Biotronik ICD    HX TUBAL LIGATION      IR BX BONE MARROW DIAGNOSTIC  2011       Allergies:   Allergies   Allergen Reactions    Ferrlecit [Sodium Ferric Gluconat-Sucrose] Other (comments)     Chest pain, reported MI after administration    Sulfa (Sulfonamide Antibiotics) Hives    Aspirin Nausea Only     Cannot take uncoated aspirin       Home Medications:  Prior to Admission medications    Medication Sig Start Date End Date Taking? Authorizing Provider   atorvastatin (LIPITOR) 10 mg tablet Take 10 mg by mouth nightly. Yes Provider, Historical   pantoprazole (PROTONIX) 40 mg tablet Take 40 mg by mouth daily. Yes Provider, Historical   furosemide (LASIX) 40 mg tablet TAKE 1 TABLET BY MOUTH EVERY DAY 4/22/19  Yes Felisha Wilson MD   traZODone (DESYREL) 50 mg tablet TAKE 1/2-1 TABS BY MOUTH NIGHTLY. 3/19/19  Yes Felisha Wilson MD   carvedilol (COREG) 6.25 mg tablet Take 1 Tab by mouth two (2) times daily (with meals). 1/21/19  Yes Yoly Borjas MD   sertraline (ZOLOFT) 50 mg tablet Take 1 Tab by mouth daily. 12/18/18  Yes Elliot High MD   fluticasone-vilanterol (BREO ELLIPTA) 994-23 mcg/dose inhaler Take 1 Puff by inhalation daily. 11/1/18  Yes Marisol Ramirez NP   aspirin delayed-release 81 mg tablet Take 81 mg by mouth daily. Yes Provider, Historical   docusate sodium (COLACE) 50 mg capsule Take 50 mg by mouth daily. Yes Provider, Historical   ferrous gluconate 324 mg (38 mg iron) tablet TAKE 1 TAB BY MOUTH DAILY (BEFORE BREAKFAST). INDICATIONS: IRON DEFICIENCY ANEMIA  Patient taking differently: Take 325 mg by mouth two (2) times daily (with meals). 4/5/18  Yes Elliot High MD   fluticasone (FLONASE) 50 mcg/actuation nasal spray 2 Sprays by Both Nostrils route daily as needed. Patient taking differently: 2 Sprays by Both Nostrils route daily as needed for Allergies. 3/7/18  Yes Elliot High MD   albuterol (PROVENTIL VENTOLIN) 2.5 mg /3 mL (0.083 %) nebulizer solution 3 mL by Nebulization route every four (4) hours as needed for Wheezing. 8/30/17  Yes Marisol Ramirez NP   albuterol (VENTOLIN HFA) 90 mcg/actuation inhaler 2 puffs qid prn  Patient taking differently: Take 2 Puffs by inhalation every six (6) hours as needed for Wheezing or Shortness of Breath.  8/30/17  Yes Natasha Art, Hannah OROURKE NP   OXYGEN-AIR DELIVERY SYSTEMS 3 L by Nasal route continuous. Yes Provider, Historical   acetaminophen (TYLENOL) 325 mg tablet Take 650 mg by mouth every six (6) hours as needed for Pain. Provider, Historical   simvastatin (ZOCOR) 20 mg tablet Take 1 Tab by mouth nightly.   Patient not taking: Reported on 7/24/2019 12/12/18   Adrián Maldonado MD       Hospital Medications:  Current Facility-Administered Medications   Medication Dose Route Frequency    warfarin (COUMADIN) tablet 5 mg  5 mg Oral QHS    tuberculin injection 5 Units  5 Units IntraDERMal ONCE    carvedilol (COREG) tablet 6.25 mg  6.25 mg Oral BID WITH MEALS    aspirin delayed-release tablet 81 mg  81 mg Oral DAILY    albuterol (PROVENTIL VENTOLIN) nebulizer solution 2.5 mg  2.5 mg Nebulization Q4H PRN    acetaminophen (TYLENOL) tablet 650 mg  650 mg Oral Q6H PRN    docusate (COLACE) 50 mg/5 mL oral liquid 50 mg  50 mg Oral DAILY    fluticasone propionate (FLONASE) 50 mcg/actuation nasal spray 2 Spray  2 Spray Both Nostrils DAILY PRN    ferrous gluconate 324 mg (38 mg iron) tablet 1 Tab  1 Tab Oral BID WITH MEALS    traZODone (DESYREL) tablet 50 mg  50 mg Oral QHS PRN    sertraline (ZOLOFT) tablet 50 mg  50 mg Oral DAILY    pantoprazole (PROTONIX) tablet 40 mg  40 mg Oral ACB    simvastatin (ZOCOR) tablet 20 mg  20 mg Oral QHS    ondansetron (ZOFRAN) injection 4 mg  4 mg IntraVENous Q6H PRN    furosemide (LASIX) injection 40 mg  40 mg IntraVENous BID    docusate sodium (COLACE) capsule 100 mg  100 mg Oral PRN    budesonide (PULMICORT) 500 mcg/2 ml nebulizer suspension  500 mcg Nebulization BID RT    And    albuterol (PROVENTIL VENTOLIN) nebulizer solution 2.5 mg  2.5 mg Nebulization Q6HWA RT       Social History:  Social History     Tobacco Use    Smoking status: Former Smoker     Packs/day: 0.25     Years: 42.00     Pack years: 10.50     Types: Cigarettes     Start date: 10/1/1956     Last attempt to quit: 5/1/2014 Years since quittin.2    Smokeless tobacco: Never Used   Substance Use Topics    Alcohol use: No     Alcohol/week: 0.0 standard drinks       Pt denies any history of drug use, blood transfusions, or tattoos. Family History:  Family History   Problem Relation Age of Onset    Heart Disease Mother         CHF    Hypertension Mother     Kidney Disease Mother     Heart Disease Father         CHF    Lung Disease Father     Diabetes Father     Cancer Father         prostate    Hypertension Father     Heart Attack Father     Heart Disease Maternal Aunt     Diabetes Brother     Coronary Artery Disease Other     Breast Cancer Neg Hx        Review of Systems:  A detailed 10 system ROS is obtained, with pertinent positives as listed above. All others are negative. Diet:  Cardiac    Objective:     Physical Exam:  Vitals:  Visit Vitals  /67   Pulse 68   Temp 97.9 °F (36.6 °C)   Resp 16   Ht 5' 2\" (1.575 m)   Wt 99.9 kg (220 lb 4.8 oz)   SpO2 95%   BMI 40.29 kg/m²     Gen:  Pt is alert, cooperative, no acute distress  Skin:  Extremities and face reveal no rashes. HEENT: Sclerae anicteric. Extra-occular muscles are intact. Cardiovascular: Regular rate and rhythm. No murmurs, gallops, or rubs. Respiratory:  Course BS  GI:  Abdomen nondistended, soft, and nontender. Normal active bowel sounds. Rectal:  Deferred  Musculoskeletal:  +1 pitting edema of the lower legs. Neurological:  Gross memory appears intact. Patient is alert and oriented. Psychiatric:  Mood appears appropriate with judgement intact.     Laboratory:    Recent Labs     19  0435 19  2251 19  1646   WBC 5.5  --  7.1   HGB 8.2*  --  8.7*   HCT 28.3*  --  30.4*   *  --  151   .0*  --  100.3*     --  141   K 4.4  --  4.3     --  97*   CO2 37*  --  36*   BUN 51*  --  53*   CREA 2.08*  --  2.20*   CA 8.7  --  9.1   MG  --  1.8  --    GLU 87  --  142*   AP  --   --  72   SGOT  --   -- 18   ALT  --   --  20   TBILI  --   --  0.6   ALB  --   --  3.4   TP  --   --  7.1   PTP 23.9*  --   --    INR 2.2  --   --           Assessment:     Principal Problem:    Hypercapnic respiratory failure ([address] (7/29/2019)    Active Problems:    MDS (myelodysplastic syndrome) ([facility] (12/17/2011)      Overview: Procrit started in August, 2011 12/18/11 Procrit weekly and Iron stores. 5-12 12-13-12 good response to 3 weekly procrit did not need it last time            3-7-13 Pt doing well. Just wanted a \"check-up. \" Responding to Procrit       every three weeks. 8-29-13 patient has missed some injections on recently restarted       hemoglobin only issue , takes oral iron iron stores each time             CAD (coronary artery disease) (1/20/2013)      Overview: 5/8/14 PCI LAD with stent placed      Essential hypertension, benign (1/20/2013)      CKD (chronic kidney disease) stage 4, GFR 15-29 ml/min ([facility]) (7/10/2013)      COPD (chronic obstructive pulmonary disease) ([facility] (4/2/2014)      Overview: HOME OXYGEN 3 LITERS      REJI (obstructive sleep apnea)-cpap (4/2/2014)      ICD (implantable cardioverter-defibrillator) in place (10/2/2014)      Overview: Biotronik single-chamber ICD implantation 10/20/14      HLD (hyperlipidemia) ()      S/P AVR (aortic valve replacement) ()      Overview: Mechanical/Artific      Cardiomyopathy ([facility] ()      Type 2 diabetes mellitus with nephropathy ([facility] (96/67/9462)      Systolic CHF, acute on chronic ([facility] (12/31/2018)      CHF exacerbation ([facility] (7/28/2019)      68 y.o. female with PMH including but not limited to CKD IV, COPD, myelodysplasic syndrome w anemia, HTN, HLD, CAD w cardiac arrest, mechanical AVR on Coumadin with ECHO Nov 2018 w EF 25% w severe hypokinesis and MR s/p Biotronik ICD admitted w acute on chronic CHF. She was originally scheudled for heparin window and EGD w EMR on 8/2 for duodenal mass with ulceration.   EGD/COLO June 11, 2019 w findings of IH, tics, colon polyps, gastritis and duodenal mass w ulceration which was thought to be sources of pt anemia. Duodenal mass pathology came back as adenomatous polyp. Plan:     - Spoke with Niki Barbosa NP Cardiology and Cards will stop Coumadin today and start heparin window  - Follow INR, coags  - Monitor for GIB  - Monitor H&H and transfuse for hgb < 8.0 - stable today at 8.2  - Monitor respiratory status and when appropriate will plan EGD w EMR when INR also acceptable    Billy Murillo NP  Patient is seen and examined in collaboration with Dr. Fortunato Pantoja. Assessment and plan as per Dr. Matty Garcia.

## 2019-07-29 NOTE — PROGRESS NOTES
07/28/19 2133   Oxygen Therapy   O2 Sat (%) 96 %   Pulse via Oximetry 72 beats per minute   O2 Device   (Home Trilogy / home settings)   O2 Flow Rate (L/min) 10 l/min   PT. resting well on home Trilogy / home settings.

## 2019-07-29 NOTE — PROGRESS NOTES
PT Note:  PT orders received and chart review initiated. Patient with positive troponin and elevated CO2, will hold PT and attempt another time/day as schedule permits.   A Jacqui Avina, PT, DPT

## 2019-07-29 NOTE — PROGRESS NOTES
TRANSFER - IN REPORT:    Verbal report received from Haim(name) on [employer]  being received from ER(unit) for routine progression of care      Report consisted of patients Situation, Background, Assessment and   Recommendations(SBAR). Information from the following report(s) ED Summary was reviewed with the receiving nurse. Opportunity for questions and clarification was provided. Assessment completed upon patients arrival to unit and care assumed. Arrived via stretcher with family. Max assist of 4 to get her to transfer. RT her to place on her own home trilogy machine. Wearing briefs. No skin breakdown but scattered bruises and vascular changes to lower legs. Pedal pulses palpable. Call light in reach.

## 2019-07-29 NOTE — H&P
Hospitalist H&P/Consult Note     Admit Date:  2019  4:43 PM   Name:  Shy Keyes   Age:  68 y.o.  :  1950   MRN:  [id number]   PCP:  Iveth Renae MD  Treatment Team: Attending Provider: Kiersten Curiel MD    HPI:   Patient is a 69 y/o female with hx CAD, s/p AVR, on warfarin, anemia and MDS, ICD, COPD, CHF, CKD, pulmonary HTN, REJI-uses trilogy CAPAP who presents to ED after a suspected syncopal episode or fall at home. She has bruising on left arm. Family found patient lethargic on the bed, staring off into space and not very responsive. She was still breathing. Usually uses O2 at 3 L. . Patient does not recall any event. She also has increased swelling to her legs. Workup in ED shows a BNP of 1354 and chest xray with worsening mild interstitial edema. Head CT is negative. ABG with respiratory acidosis with a pH of 7.211 and CO2 99.5. PO2 is 70. Will admit for further treatment of acute CHF and hypercarbic respiratory failure. 10 systems reviewed and negative except as noted in HPI. Denies chest pain, F/C  Past Medical History:   Diagnosis Date    Anticoagulated on Coumadin 2013    S/P AVR     CAD (coronary artery disease) 2013 PCI LAD with stent placed     Cardiomyopathy ([address]     CHF (congestive heart failure) ([address] 2017    CKD (chronic kidney disease) stage 3, GFR 30-59 ml/min ([facility]) 7/10/2013    COPD (chronic obstructive pulmonary disease) ([address] 2014    Diabetes ([address]     Essential hypertension, benign 2013    HLD (hyperlipidemia)     ICD (implantable cardioverter-defibrillator) in place 10/2/2014    B[employer]ronik single-chamber ICD implantation 10/20/14     Iron deficiency anemia due to chronic blood loss 2009    MDS (myelodysplastic syndrome) ([address] 2011    Procrit started in 11 Procrit weekly and Iron stores.  5-12      12 good response to 3 weekly procrit did not need it last time  3-7-13 Pt doing well. Just wanted a \"check-up. \" Responding to Procrit every three weeks. 8-29-13 patient has missed some injections on recently restarted hemoglobin only issue , takes oral iron iron stores each time       REJI (obstructive sleep apnea)-cpap 2014    Osteoarthritis     Osteopenia     Pulmonary hypertension ([facility] 6/15/2016    Quadrantanopia     Skin defect 2018    Visual complaint 2015      Past Surgical History:   Procedure Laterality Date    CARDIAC CATHETERIZATION      COLONOSCOPY N/A 2019    COLONOSCOPY/ BMI 40 ROOM 637 performed by Oleg Thompson MD at [facility], 2005    mechanical valve     HX  SECTION      HX CORONARY STENT PLACEMENT  May, 2014    STEMI with Stent placement.  HX ENDOSCOPY  2009    EGD    HX HEART CATHETERIZATION      HX PACEMAKER  10/2/2014    Biotronik ICD    HX TUBAL LIGATION      IR BX BONE MARROW DIAGNOSTIC  2011      Prior to Admission Medications   Prescriptions Last Dose Informant Patient Reported? Taking? OXYGEN-AIR DELIVERY SYSTEMS 2019 at Unknown time  Yes Yes   Sig: 3 L by Nasal route continuous. acetaminophen (TYLENOL) 325 mg tablet Unknown at Unknown time  Yes No   Sig: Take 650 mg by mouth every six (6) hours as needed for Pain. albuterol (PROVENTIL VENTOLIN) 2.5 mg /3 mL (0.083 %) nebulizer solution 2019 at Unknown time  No Yes   Sig: 3 mL by Nebulization route every four (4) hours as needed for Wheezing. albuterol (VENTOLIN HFA) 90 mcg/actuation inhaler 2019 at Unknown time  No Yes   Si puffs qid prn   Patient taking differently: Take 2 Puffs by inhalation every six (6) hours as needed for Wheezing or Shortness of Breath. aspirin delayed-release 81 mg tablet 2019 at Unknown time  Yes Yes   Sig: Take 81 mg by mouth daily. atorvastatin (LIPITOR) 10 mg tablet 2019 at Unknown time  Yes Yes   Sig: Take 10 mg by mouth nightly. carvedilol (COREG) 6.25 mg tablet 2019 at Unknown time  No Yes   Sig: Take 1 Tab by mouth two (2) times daily (with meals). docusate sodium (COLACE) 50 mg capsule 2019 at Unknown time  Yes Yes   Sig: Take 50 mg by mouth daily. ferrous gluconate 324 mg (38 mg iron) tablet 2019 at Unknown time  No Yes   Sig: TAKE 1 TAB BY MOUTH DAILY (BEFORE BREAKFAST). INDICATIONS: IRON DEFICIENCY ANEMIA   Patient taking differently: Take 325 mg by mouth two (2) times daily (with meals). fluticasone (FLONASE) 50 mcg/actuation nasal spray 2019 at Unknown time  No Yes   Si Sprays by Both Nostrils route daily as needed. Patient taking differently: 2 Sprays by Both Nostrils route daily as needed for Allergies. fluticasone-vilanterol (BREO ELLIPTA) 200-25 mcg/dose inhaler 2019 at Unknown time  No Yes   Sig: Take 1 Puff by inhalation daily. furosemide (LASIX) 40 mg tablet 2019 at Unknown time  No Yes   Sig: TAKE 1 TABLET BY MOUTH EVERY DAY   pantoprazole (PROTONIX) 40 mg tablet 2019 at Unknown time  Yes Yes   Sig: Take 40 mg by mouth daily. sertraline (ZOLOFT) 50 mg tablet 2019 at Unknown time  No Yes   Sig: Take 1 Tab by mouth daily. simvastatin (ZOCOR) 20 mg tablet Not Taking at Unknown time Self No No   Sig: Take 1 Tab by mouth nightly. Patient not taking: Reported on 2019   traZODone (DESYREL) 50 mg tablet 2019 at Unknown time  No Yes   Sig: TAKE 1/2-1 TABS BY MOUTH NIGHTLY.       Facility-Administered Medications: None     Allergies   Allergen Reactions    Ferrlecit [Sodium Ferric Gluconat-Sucrose] Other (comments)     Chest pain, reported MI after administration    Sulfa (Sulfonamide Antibiotics) Hives    Aspirin Nausea Only     Cannot take uncoated aspirin      Social History     Tobacco Use    Smoking status: Former Smoker     Packs/day: 0.25     Years: 42.00     Pack years: 10.50     Types: Cigarettes     Start date: 10/1/1956     Last attempt to quit: 2014     Years since quittin.2    Smokeless tobacco: Never Used   Substance Use Topics    Alcohol use: No     Alcohol/week: 0.0 standard drinks      Family History   Problem Relation Age of Onset    Heart Disease Mother         CHF    Hypertension Mother     Kidney Disease Mother     Heart Disease Father         CHF    Lung Disease Father     Diabetes Father     Cancer Father         prostate    Hypertension Father     Heart Attack Father     Heart Disease Maternal Aunt     Diabetes Brother     Coronary Artery Disease Other     Breast Cancer Neg Hx       Immunization History   Administered Date(s) Administered    Influenza High Dose Vaccine PF 10/01/2016, 2017, 10/17/2018    Influenza Vaccine 2013, 10/01/2014, 2015    Pneumococcal Conjugate (PCV-13) 2015    Pneumococcal Vaccine (Unspecified Type) 2013    TB Skin Test (PPD) Intradermal 2014, 2018, 2018, 10/06/2018, 2018, 2019       Objective:     Patient Vitals for the past 24 hrs:   Temp Pulse Resp BP SpO2   19     96 %   19 98.4 °F (36.9 °C) 72 15 129/67 96 %   19  70 14 136/62 91 %   19  70 14 117/58 99 %   19 195  71      19 1859  74 15 121/59 94 %   19 1829  73 13 126/56 93 %   19 1759  75 14 125/60 94 %   19 1746  78   (!) 77 %   19 1729  75 13 105/52 94 %   19 1638 98.6 °F (37 °C) 76 18 (!) 153/108 99 %     Oxygen Therapy  O2 Sat (%): 96 % (19)  Pulse via Oximetry: 72 beats per minute (19)  O2 Device: (Home Trilogy / home settings) (19)  O2 Flow Rate (L/min): 10 l/min (19)  No intake or output data in the 24 hours ending 19 0100    Physical Exam:  General:    Well nourished. Somnolent, lethargic  Eyes:   Normal sclera. Extraocular movements intact. ENT:  Normocephalic, atraumatic. Moist mucous membranes  CV:   RRR. murmur  Lungs:  wheezing and rales bilaterally  Abdomen: Soft, nontender, nondistended. Bowel sounds normal.   Extremities: Warm and dry. No cyanosis. Edematous legs  Bruising left arm  Neurologic: CN II-XII grossly intact. Sensation intact. Skin:     No rashes or jaundice. No wounds. Psych:  Normal mood and flat affect. I reviewed the labs, imaging, EKGs, telemetry, and other studies done this admission. Data Review:   Recent Results (from the past 24 hour(s))   EKG, 12 LEAD, INITIAL    Collection Time: 07/28/19  4:42 PM   Result Value Ref Range    Ventricular Rate 78 BPM    Atrial Rate 78 BPM    P-R Interval 180 ms    QRS Duration 150 ms    Q-T Interval 426 ms    QTC Calculation (Bezet) 485 ms    Calculated P Axis 36 degrees    Calculated R Axis -50 degrees    Calculated T Axis 110 degrees    Diagnosis       Normal sinus rhythm  Left axis deviation  Left bundle branch block  Abnormal ECG  When compared with ECG of 06-JUN-2019 16:10,  Premature ventricular complexes are no longer Present     TROPONIN I    Collection Time: 07/28/19  4:46 PM   Result Value Ref Range    Troponin-I, Qt. 0.07 (H) 0.02 - 0.05 NG/ML   CBC WITH AUTOMATED DIFF    Collection Time: 07/28/19  4:46 PM   Result Value Ref Range    WBC 7.1 4.3 - 11.1 K/uL    RBC 3.03 (L) 4.05 - 5.2 M/uL    HGB 8.7 (L) 11.7 - 15.4 g/dL    HCT 30.4 (L) 35.8 - 46.3 %    .3 (H) 79.6 - 97.8 FL    MCH 28.7 26.1 - 32.9 PG    MCHC 28.6 (L) 31.4 - 35.0 g/dL    RDW 15.8 (H) 11.9 - 14.6 %    PLATELET 914 022 - 596 K/uL    MPV 11.8 9.4 - 12.3 FL    ABSOLUTE NRBC 0.00 0.0 - 0.2 K/uL    DF AUTOMATED      NEUTROPHILS 77 43 - 78 %    LYMPHOCYTES 12 (L) 13 - 44 %    MONOCYTES 9 4.0 - 12.0 %    EOSINOPHILS 1 0.5 - 7.8 %    BASOPHILS 1 0.0 - 2.0 %    IMMATURE GRANULOCYTES 1 0.0 - 5.0 %    ABS. NEUTROPHILS 5.5 1.7 - 8.2 K/UL    ABS. LYMPHOCYTES 0.8 0.5 - 4.6 K/UL    ABS. MONOCYTES 0.6 0.1 - 1.3 K/UL    ABS. EOSINOPHILS 0.1 0.0 - 0.8 K/UL    ABS.  BASOPHILS 0.1 0.0 - 0.2 K/UL    ABS. IMM. GRANS. 0.1 0.0 - 0.5 K/UL   METABOLIC PANEL, COMPREHENSIVE    Collection Time: 07/28/19  4:46 PM   Result Value Ref Range    Sodium 141 136 - 145 mmol/L    Potassium 4.3 3.5 - 5.1 mmol/L    Chloride 97 (L) 98 - 107 mmol/L    CO2 36 (H) 21 - 32 mmol/L    Anion gap 8 7 - 16 mmol/L    Glucose 142 (H) 65 - 100 mg/dL    BUN 53 (H) 8 - 23 MG/DL    Creatinine 2.20 (H) 0.6 - 1.0 MG/DL    GFR est AA 28 (L) >60 ml/min/1.73m2    GFR est non-AA 23 (L) >60 ml/min/1.73m2    Calcium 9.1 8.3 - 10.4 MG/DL    Bilirubin, total 0.6 0.2 - 1.1 MG/DL    ALT (SGPT) 20 12 - 65 U/L    AST (SGOT) 18 15 - 37 U/L    Alk. phosphatase 72 50 - 136 U/L    Protein, total 7.1 6.3 - 8.2 g/dL    Albumin 3.4 3.2 - 4.6 g/dL    Globulin 3.7 (H) 2.3 - 3.5 g/dL    A-G Ratio 0.9 (L) 1.2 - 3.5     CK    Collection Time: 07/28/19  4:46 PM   Result Value Ref Range    CK 29 21 - 215 U/L   BNP    Collection Time: 07/28/19  4:46 PM   Result Value Ref Range    BNP 1,354 (H) 0 pg/mL   URINALYSIS W/ RFLX MICROSCOPIC    Collection Time: 07/28/19  5:51 PM   Result Value Ref Range    Color YELLOW      Appearance CLOUDY      Specific gravity 1.013 1.001 - 1.023      pH (UA) 5.0 5.0 - 9.0      Protein TRACE (A) NEG mg/dL    Glucose NEGATIVE  mg/dL    Ketone NEGATIVE  NEG mg/dL    Bilirubin NEGATIVE  NEG      Blood NEGATIVE  NEG      Urobilinogen 1.0 0.2 - 1.0 EU/dL    Nitrites NEGATIVE  NEG      Leukocyte Esterase NEGATIVE  NEG      RBC 0-3 0 /hpf    Epithelial cells 0-3 0 /hpf    Bacteria TRACE 0 /hpf    Casts HYALINE 0 /lpf    Amorphous Crystals 1+ (H) 0   POC G3    Collection Time: 07/28/19  7:48 PM   Result Value Ref Range    Device: NASAL CANNULA      pH (POC) 7.211 (LL) 7.35 - 7.45      pCO2 (POC) 99.5 (HH) 35 - 45 MMHG    pO2 (POC) 70 (L) 75 - 100 MMHG    HCO3 (POC) 39.9 (H) 22 - 26 MMOL/L    sO2 (POC) 88 (L) 95 - 98 %    Base excess (POC) 9 mmol/L    Allens test (POC) YES      Site LEFT RADIAL      Patient temp.  98.6      Specimen type (POC) ARTERIAL      Performed by [facility]     CO2, POC 43 MMOL/L    Flow rate (POC) 3.000 L/min    Respiratory comment: PhysicianNotified     COLLECT TIME 1,935     TROPONIN I    Collection Time: 07/28/19 10:51 PM   Result Value Ref Range    Troponin-I, Qt. 0.08 (H) 0.02 - 0.05 NG/ML   MAGNESIUM    Collection Time: 07/28/19 10:51 PM   Result Value Ref Range    Magnesium 1.8 1.8 - 2.4 mg/dL   PHOSPHORUS    Collection Time: 07/28/19 10:51 PM   Result Value Ref Range    Phosphorus 5.7 (H) 2.3 - 3.7 MG/DL   TSH 3RD GENERATION    Collection Time: 07/28/19 10:51 PM   Result Value Ref Range    TSH 1.790 0.358 - 3.740 uIU/mL       Imaging Studies:  CXR Results  (Last 48 hours)               07/28/19 1838  XR CHEST PORT Final result    Impression:  Impression:  Suspect worsening mild interstitial edema. Narrative:  AP chest radiograph       History: syncope, 68 years Female       Comparison: Chest radiograph June 07, 2019       Findings:  Cardiac pacing device obscures part of the left chest wall, single   lead appears to remain in anatomic position. Normal cardiomediastinal   silhouette with evidence of CABG. Persistent mild hyperinflation suggestive of   COPD. Nonspecific mild diffuse interstitial prominence appears slightly   increased since prior, may represent edema in the appropriate clinical setting. Trace bilateral pleural effusions appear slightly increased since prior. Mild   subsegmental atelectasis bilateral lung bases. No evidence of pneumothorax. Visualized soft tissue and osseous structures otherwise unremarkable.                  CT Results  (Last 48 hours)    None          Assessment and Plan:     Hospital Problems as of 7/29/2019 Date Reviewed: 7/2/2019          Codes Class Noted - Resolved POA    * (Principal) CHF exacerbation ([facility] ICD-10-CM: I50.9  ICD-9-CM: 428.0  7/28/2019 - Present Yes        Systolic CHF, acute on chronic (HCC) ICD-10-CM: I50.23  ICD-9-CM: 428.23, 428.0 12/31/2018 - Present Yes        Type 2 diabetes mellitus with nephropathy (HCC) (Chronic) ICD-10-CM: E11.21  ICD-9-CM: 250.40, 583.81  12/18/2017 - Present Yes        S/P AVR (aortic valve replacement) (Chronic) ICD-10-CM: Z95.2  ICD-9-CM: V43.3  Unknown - Present Yes    Overview Signed 2/23/2016 11:04 AM by Juan Jose Duron     Mechanical/Artific             Cardiomyopathy ([facility] (Chronic) ICD-10-CM: I42.9  ICD-9-CM: 425.4  Unknown - Present Yes        HLD (hyperlipidemia) (Chronic) ICD-10-CM: E78.5  ICD-9-CM: 272.4  Unknown - Present Yes        ICD (implantable cardioverter-defibrillator) in place ICD-10-CM: Z95.810  ICD-9-CM: V45.02  10/2/2014 - Present Yes    Overview Signed 10/2/2014  4:58 PM by Mohamud Modi single-chamber ICD implantation 10/20/14             COPD (chronic obstructive pulmonary disease) ([facility] (Chronic) ICD-10-CM: J44.9  ICD-9-CM: 175  4/2/2014 - Present Yes    Overview Signed 10/6/2018  8:56 PM by Roland León NP     HOME OXYGEN 3 LITERS             REJI (obstructive sleep apnea)-cpap (Chronic) ICD-10-CM: G40.41  ICD-9-CM: 327.23  4/2/2014 - Present Yes        CKD (chronic kidney disease) stage 4, GFR 15-29 ml/min (HCC) ICD-10-CM: N18.4  ICD-9-CM: 585.4  7/10/2013 - Present Yes        CAD (coronary artery disease) (Chronic) ICD-10-CM: I25.10  ICD-9-CM: 414.00  1/20/2013 - Present Yes    Overview Signed 5/20/2014 10:05 AM by Jeramie Arriola NP     5/8/14 PCI LAD with stent placed             Essential hypertension, benign (Chronic) ICD-10-CM: I10  ICD-9-CM: 401.1  1/20/2013 - Present Yes        MDS (myelodysplastic syndrome) (HCC) (Chronic) ICD-10-CM: D46.9  ICD-9-CM: 238.75  12/17/2011 - Present Yes    Overview Addendum 8/29/2013 11:06 AM by Melodie Nieves started in August, 2011 12/18/11 Procrit weekly and Iron stores. 5-12 12-13-12 good response to 3 weekly procrit did not need it last time    3-7-13 Pt doing well. Just wanted a \"check-up. \" Responding to Procrit every three weeks. 8-29-13 patient has missed some injections on recently restarted hemoglobin only issue , takes oral iron iron stores each time                      PLAN:  · Admit inpatient to remote telemetry  · CHF careset utilized  · Strict ins and outs, daily weights  · Diurese with lasix 40 mg BID IV  · Serial troponin. Monitor BMP, Mg  · Continue coreg.  Cr too high for ACE I/ARB  · Use BIPAP until family can bring her Trilogy,  · Will need to use BIPAP settings until CO2 corrects,   · Hypercarbia likely the etiology for her somnolence  · Obtain echocardiogram in am  · Patient's pacer interrogated and is functioning correctly  · Consult patient's Cardiologist in am  · Continue warfarin dosing for anticoagulation  · Monitor blood counts with anticoagulation and hx MDS  · Discussed with family at bedside      Estimated LOS:  Greater than 2 midnights    Signed:  Reuben Bowen MD

## 2019-07-29 NOTE — PROGRESS NOTES
Progress Note    2019  Admit Date: 2019  4:43 PM   NAME: Sandra Beard   :  1950   MRN:  [id number]   Attending: Veda Roman MD  PCP:  Miguel Barker MD  Treatment Team: Attending Provider: Veda Roman MD; Consulting Provider: David Trevizo MD; Care Manager: Truong Mark, RN; Physical Therapist: Corey Browning, PT, DPT; Utilization Review: Ag Fernandes    Full Code   SUBJECTIVE:   Ms. Farzad Villagomez is a 67 yo female with PMH of CAD, systolic CHF, ICD, CKD 4, HTN, hyperlipidemia, MDS, COPD, [facility] trilogy[facility], small bowel mass with plans for endoscopy this week who presented from rehab with increased in LE edema, SOB. She was found with a blank stare and minimally responsive PTA. CT head pending. Pt found to have PH 7.211, CO2, 99.5, O2 70, HCO3 39.9. BNP increased from baseline. ICD interrogated, NO shocks identified. Pt was supposed to be directly admitted today for heparin bridge for plans for endoscopy later this week. Cardiology consulted. Pt on 3L O2, NC. She was started on lasix IV BID. Pt does not recall syncopal type episode PTA. She does report feeling better this morning. Spoke to her daughter who is a nurse here in length, daughter request I speak to pt in regards to goals of care, treatment plan. Daughter very realistic in that patient has multiple complicated co-morbities and continues to decline. Pt denies CP, n/v/d, abd pain.           Past Medical History:   Diagnosis Date    Anticoagulated on Coumadin 2013    S/P AVR     CAD (coronary artery disease) 2013 PCI LAD with stent placed     Cardiomyopathy [facility]     CHF (congestive heart failure) ([facility] [address] 2017    CKD (chronic kidney disease) stage 3, GFR 30-59 ml/min ([facility]) 7/10/2013    COPD (chronic obstructive pulmonary disease) ([facility] [address] 2014    Diabetes ([facility] [address]     Essential hypertension, benign 2013    HLD (hyperlipidemia)     ICD (implantable cardioverter-defibrillator) in place 10/2/2014    Biotronik single-chamber ICD implantation 10/20/14     Iron deficiency anemia due to chronic blood loss 7/29/2009    MDS (myelodysplastic syndrome) ([facility]) 12/17/2011    Procrit started in August, 2011 12/18/11 Procrit weekly and Iron stores. 5-12 12-13-12 good response to 3 weekly procrit did not need it last time  3-7-13 Pt doing well. Just wanted a \"check-up. \" Responding to Procrit every three weeks.  8-29-13 patient has missed some injections on recently restarted hemoglobin only issue , takes oral iron iron stores each time       REJI (obstructive sleep apnea)-cpap 4/2/2014    Osteoarthritis     Osteopenia     Pulmonary hypertension ([facility]) 6/15/2016    Quadrantanopia     Skin defect 11/1/2018    Visual complaint 12/14/2015     Recent Results (from the past 24 hour(s))   EKG, 12 LEAD, INITIAL    Collection Time: 07/28/19  4:42 PM   Result Value Ref Range    Ventricular Rate 78 BPM    Atrial Rate 78 BPM    P-R Interval 180 ms    QRS Duration 150 ms    Q-T Interval 426 ms    QTC Calculation (Bezet) 485 ms    Calculated P Axis 36 degrees    Calculated R Axis -50 degrees    Calculated T Axis 110 degrees    Diagnosis       Normal sinus rhythm  Left axis deviation  Non-specific intra-ventricular conduction block  Abnormal ECG  When compared with ECG of 06-JUN-2019 16:10,  Premature ventricular complexes are no longer Present  Confirmed by Won Costello MD (), ENDER JIMENEZ ([id number]) on 7/29/2019 8:29:10 AM     TROPONIN I    Collection Time: 07/28/19  4:46 PM   Result Value Ref Range    Troponin-I, Qt. 0.07 (H) 0.02 - 0.05 NG/ML   CBC WITH AUTOMATED DIFF    Collection Time: 07/28/19  4:46 PM   Result Value Ref Range    WBC 7.1 4.3 - 11.1 K/uL    RBC 3.03 (L) 4.05 - 5.2 M/uL    HGB 8.7 (L) 11.7 - 15.4 g/dL    HCT 30.4 (L) 35.8 - 46.3 %    .3 (H) 79.6 - 97.8 FL    MCH 28.7 26.1 - 32.9 PG    MCHC 28.6 (L) 31.4 - 35.0 g/dL    RDW 15.8 (H) 11.9 - 14.6 %    PLATELET 151 150 - 450 K/uL    MPV 11.8 9.4 - 12.3 FL    ABSOLUTE NRBC 0.00 0.0 - 0.2 K/uL    DF AUTOMATED      NEUTROPHILS 77 43 - 78 %    LYMPHOCYTES 12 (L) 13 - 44 %    MONOCYTES 9 4.0 - 12.0 %    EOSINOPHILS 1 0.5 - 7.8 %    BASOPHILS 1 0.0 - 2.0 %    IMMATURE GRANULOCYTES 1 0.0 - 5.0 %    ABS. NEUTROPHILS 5.5 1.7 - 8.2 K/UL    ABS. LYMPHOCYTES 0.8 0.5 - 4.6 K/UL    ABS. MONOCYTES 0.6 0.1 - 1.3 K/UL    ABS. EOSINOPHILS 0.1 0.0 - 0.8 K/UL    ABS. BASOPHILS 0.1 0.0 - 0.2 K/UL    ABS. IMM. GRANS. 0.1 0.0 - 0.5 K/UL   METABOLIC PANEL, COMPREHENSIVE    Collection Time: 07/28/19  4:46 PM   Result Value Ref Range    Sodium 141 136 - 145 mmol/L    Potassium 4.3 3.5 - 5.1 mmol/L    Chloride 97 (L) 98 - 107 mmol/L    CO2 36 (H) 21 - 32 mmol/L    Anion gap 8 7 - 16 mmol/L    Glucose 142 (H) 65 - 100 mg/dL    BUN 53 (H) 8 - 23 MG/DL    Creatinine 2.20 (H) 0.6 - 1.0 MG/DL    GFR est AA 28 (L) >60 ml/min/1.73m2    GFR est non-AA 23 (L) >60 ml/min/1.73m2    Calcium 9.1 8.3 - 10.4 MG/DL    Bilirubin, total 0.6 0.2 - 1.1 MG/DL    ALT (SGPT) 20 12 - 65 U/L    AST (SGOT) 18 15 - 37 U/L    Alk.  phosphatase 72 50 - 136 U/L    Protein, total 7.1 6.3 - 8.2 g/dL    Albumin 3.4 3.2 - 4.6 g/dL    Globulin 3.7 (H) 2.3 - 3.5 g/dL    A-G Ratio 0.9 (L) 1.2 - 3.5     CK    Collection Time: 07/28/19  4:46 PM   Result Value Ref Range    CK 29 21 - 215 U/L   BNP    Collection Time: 07/28/19  4:46 PM   Result Value Ref Range    BNP 1,354 (H) 0 pg/mL   URINALYSIS W/ RFLX MICROSCOPIC    Collection Time: 07/28/19  5:51 PM   Result Value Ref Range    Color YELLOW      Appearance CLOUDY      Specific gravity 1.013 1.001 - 1.023      pH (UA) 5.0 5.0 - 9.0      Protein TRACE (A) NEG mg/dL    Glucose NEGATIVE  mg/dL    Ketone NEGATIVE  NEG mg/dL    Bilirubin NEGATIVE  NEG      Blood NEGATIVE  NEG      Urobilinogen 1.0 0.2 - 1.0 EU/dL    Nitrites NEGATIVE  NEG      Leukocyte Esterase NEGATIVE  NEG      RBC 0-3 0 /hpf    Epithelial cells 0-3 0 /hpf    Bacteria TRACE 0 /hpf    Casts HYALINE 0 /lpf    Amorphous Crystals 1+ (H) 0   POC G3    Collection Time: 07/28/19  7:48 PM   Result Value Ref Range    Device: NASAL CANNULA      pH (POC) 7.211 (LL) 7.35 - 7.45      pCO2 (POC) 99.5 (HH) 35 - 45 MMHG    pO2 (POC) 70 (L) 75 - 100 MMHG    HCO3 (POC) 39.9 (H) 22 - 26 MMOL/L    sO2 (POC) 88 (L) 95 - 98 %    Base excess (POC) 9 mmol/L    Allens test (POC) YES      Site LEFT RADIAL      Patient temp.  98.6      Specimen type (POC) ARTERIAL      Performed by [email or web address]     CO2, POC 43 MMOL/L    Flow rate (POC) 3.000 L/min    Respiratory comment: PhysicianNotified     COLLECT TIME 1,935     TROPONIN I    Collection Time: 07/28/19 10:51 PM   Result Value Ref Range    Troponin-I, Qt. 0.08 (H) 0.02 - 0.05 NG/ML   MAGNESIUM    Collection Time: 07/28/19 10:51 PM   Result Value Ref Range    Magnesium 1.8 1.8 - 2.4 mg/dL   PHOSPHORUS    Collection Time: 07/28/19 10:51 PM   Result Value Ref Range    Phosphorus 5.7 (H) 2.3 - 3.7 MG/DL   TSH 3RD GENERATION    Collection Time: 07/28/19 10:51 PM   Result Value Ref Range    TSH 1.790 0.358 - 3.740 uIU/mL   CBC W/O DIFF    Collection Time: 07/29/19  4:35 AM   Result Value Ref Range    WBC 5.5 4.3 - 11.1 K/uL    RBC 2.83 (L) 4.05 - 5.2 M/uL    HGB 8.2 (L) 11.7 - 15.4 g/dL    HCT 28.3 (L) 35.8 - 46.3 %    .0 (H) 79.6 - 97.8 FL    MCH 29.0 26.1 - 32.9 PG    MCHC 29.0 (L) 31.4 - 35.0 g/dL    RDW 15.6 (H) 11.9 - 14.6 %    PLATELET 403 (L) 822 - 450 K/uL    MPV 11.7 9.4 - 12.3 FL    ABSOLUTE NRBC 0.00 0.0 - 0.2 K/uL   PROTHROMBIN TIME + INR    Collection Time: 07/29/19  4:35 AM   Result Value Ref Range    Prothrombin time 23.9 (H) 11.7 - 14.5 sec    INR 2.2     METABOLIC PANEL, BASIC    Collection Time: 07/29/19  4:35 AM   Result Value Ref Range    Sodium 144 136 - 145 mmol/L    Potassium 4.4 3.5 - 5.1 mmol/L    Chloride 100 98 - 107 mmol/L    CO2 37 (H) 21 - 32 mmol/L    Anion gap 7 7 - 16 mmol/L    Glucose 87 65 - 100 mg/dL    BUN 51 (H) 8 - 23 MG/DL    Creatinine 2.08 (H) 0.6 - 1.0 MG/DL    GFR est AA 30 (L) >60 ml/min/1.73m2    GFR est non-AA 25 (L) >60 ml/min/1.73m2    Calcium 8.7 8.3 - 10.4 MG/DL   TROPONIN I    Collection Time: 07/29/19  4:35 AM   Result Value Ref Range    Troponin-I, Qt. 0.06 (H) 0.02 - 0.05 NG/ML   EKG, 12 LEAD, INITIAL    Collection Time: 07/29/19  6:54 AM   Result Value Ref Range    Ventricular Rate 67 BPM    Atrial Rate 67 BPM    P-R Interval 152 ms    QRS Duration 146 ms    Q-T Interval 444 ms    QTC Calculation (Bezet) 469 ms    Calculated P Axis 18 degrees    Calculated R Axis -52 degrees    Calculated T Axis 120 degrees    Diagnosis       Sinus rhythm with occasional Premature ventricular complexes  Left axis deviation  Non-specific intra-ventricular conduction block  Abnormal ECG  When compared with ECG of 28-JUL-2019 16:42,  Premature ventricular complexes are now Present  T wave inversion more evident in Anterolateral leads  Confirmed by Alvaro Arreola MD (), ENDER JIMENEZ ([id number]) on 7/29/2019 9:18:06 AM       Allergies   Allergen Reactions    Ferrlecit [Sodium Ferric Gluconat-Sucrose] Other (comments)     Chest pain, reported MI after administration    Sulfa (Sulfonamide Antibiotics) Hives    Aspirin Nausea Only     Cannot take uncoated aspirin     Current Facility-Administered Medications   Medication Dose Route Frequency Provider Last Rate Last Dose    warfarin (COUMADIN) tablet 5 mg  5 mg Oral QHS Nicky White MD   Stopped at 07/29/19 0200    carvedilol (COREG) tablet 6.25 mg  6.25 mg Oral BID WITH MEALS Nicky White MD   6.25 mg at 07/29/19 0825    aspirin delayed-release tablet 81 mg  81 mg Oral DAILY Nicky White MD   81 mg at 07/29/19 0825    albuterol (PROVENTIL VENTOLIN) nebulizer solution 2.5 mg  2.5 mg Nebulization Q4H PRN Nicky White MD        acetaminophen (TYLENOL) tablet 650 mg  650 mg Oral Q6H PRN Nicky White MD        docusate (COLACE) 50 mg/5 mL oral liquid 50 mg 50 mg Oral DAILY Blaise Nash MD   50 mg at 19 2566    fluticasone propionate (FLONASE) 50 mcg/actuation nasal spray 2 Spray  2 Spray Both Nostrils DAILY PRN Blaise Nash MD        ferrous gluconate 324 mg (38 mg iron) tablet 1 Tab  1 Tab Oral BID WITH MEALS Blaise Nash MD   1 Tab at 19 0825    traZODone (DESYREL) tablet 50 mg  50 mg Oral QHS PRN Blaise Nash MD        sertraline (ZOLOFT) tablet 50 mg  50 mg Oral DAILY Blaise Nash MD   50 mg at 19 0825    pantoprazole (PROTONIX) tablet 40 mg  40 mg Oral ACB Blaise Nash MD   40 mg at 19 7981    simvastatin (ZOCOR) tablet 20 mg  20 mg Oral QHS Blaise Nash MD   20 mg at 19 2250    ondansetron (ZOFRAN) injection 4 mg  4 mg IntraVENous Q6H PRN Blaise Nash MD        furosemide (LASIX) injection 40 mg  40 mg IntraVENous BID Blaise Nash MD   40 mg at 19 0825    docusate sodium (COLACE) capsule 100 mg  100 mg Oral PRN Blaise Nash MD        budesonide (PULMICORT) 500 mcg/2 ml nebulizer suspension  500 mcg Nebulization BID RT Blaise Nash MD   500 mcg at 19 6132    And    albuterol (PROVENTIL VENTOLIN) nebulizer solution 2.5 mg  2.5 mg Nebulization Q6HWA RT Blaise Nash MD   2.5 mg at 19 9026       Review of Systems negative with exception of pertinent positives noted above  PHYSICAL EXAM     Visit Vitals  /67   Pulse 68   Temp 97.9 °F (36.6 °C)   Resp 16   Ht 5' 2\" (1.575 m)   Wt 99.9 kg (220 lb 4.8 oz)   SpO2 95%   BMI 40.29 kg/m²      Temp (24hrs), Av.1 °F (36.7 °C), Min:97.6 °F (36.4 °C), Max:98.6 °F (37 °C)    Oxygen Therapy  O2 Sat (%): 95 % (19 0732)  Pulse via Oximetry: 68 beats per minute (19 0732)  O2 Device: Nasal cannula (19)  O2 Flow Rate (L/min): 3 l/min (19)  No intake or output data in the 24 hours ending 19 1055   General: No acute distress, appears chronically ill   Lungs: CTA bilaterally. Resp even and nonlabored  Heart:  S1S2 present without murmur rub gallops.  RRR. No LE edema  Abdomen: Soft, non tender, non distended. BS present  Extremities: Moves ext spontaneously. No cyanosis  Neurologic:  A/O X3. No focal deficits    Results summary of Diagnostic Studies/Procedures copied from within [facility] EMR:        [address] Problems    Diagnosis Date Noted    Hypercapnic respiratory failure ([facility] [address] 07/29/2019    CHF exacerbation ([facility]) 58/31/8948    Systolic CHF, acute on chronic ([facility]) 12/31/2018    Type 2 diabetes mellitus with nephropathy ([address] 12/18/2017    S/P AVR (aortic valve replacement)      Mechanical/Artific      Cardiomyopathy ([address]     HLD (hyperlipidemia)     ICD (implantable cardioverter-defibrillator) in place 10/02/2014     Biotronik single-chamber ICD implantation 10/20/14      REJI (obstructive sleep apnea)-cpap 04/02/2014    COPD (chronic obstructive pulmonary disease) ([facility] [address] 04/02/2014     HOME OXYGEN 3 LITERS      CKD (chronic kidney disease) stage 4, GFR 15-29 ml/min ([address] 07/10/2013    Essential hypertension, benign 01/20/2013    CAD (coronary artery disease) 01/20/2013 5/8/14 PCI LAD with stent placed      MDS (myelodysplastic syndrome) ([address] 12/17/2011     Procrit started in August, 2011 12/18/11 Procrit weekly and Iron stores. 5-12 12-13-12 good response to 3 weekly procrit did not need it last time    3-7-13 Pt doing well. Just wanted a \"check-up. \" Responding to Procrit every three weeks. 8-29-13 patient has missed some injections on recently restarted hemoglobin only issue , takes oral iron iron stores each time          Plan:    Acute on chronic hypoxic resp failure with hypercapnia  On baseline O2 at 3 L  ? Home compliance with trilogy  CXR with suspected worsening mild interstitial edema  Continue lasix    Duodenal adenoma  GI consulted  ?  Plans to continue with endoscopy this week, will need heparin bridge if plans to continue    MDS  Follow counts closely    HTN  Controlled  Continue current regimen  No ACE/ARB due to CKD    CKD 4  Holding ACE/ARB   Creatinine stable  Has hx of worsening renal function with diuresis  CMP in AM    REJI  Has home trilogy    S/p AVR  On coumadin  Will need heparin bridge if plans to procedure this week    DM II  A1C 5.8 in April 2019    Acute on chronic systolic CHF  Last echo Nov 2018 with EF 20-25%, grade 2 DD  Strict I/Os  Continue lasix IV  Monitor renal function closely  Cardiology following  No ACE/ARB given CKD  Continue coreg  Check echo    Notes, labs, VS, diagnostic testing reviewed  Time spent with pt 20 min      DVT Prophylaxis: coumadin  Plan of Care Discussed with: Supervising MD  Dr. Gertrudis Cho, care team, pt, daughter      Ludwig Correa, NP           Addendum:  Discussed in length code status and goals of treatment plan. Pt expresses her desire to be DNR. She also realizes that she has continued to decline medically and wishes to discuss with Palliative Care team.  Also states she wants to proceed with upcoming endoscopy this week to get a dx for \"peace of mind\" however will not pursue any treatment (surgical or non surgical) regardless of findings. Her daughter, Radha Chua [facility]) updated on conversation and was in agreement. Patient states she has pursued full code and treatment over the last several years for some family members who made her feel obligated to continue on despite her wishes. Assured her I would be the liaison to convey all her wishes to family.

## 2019-07-29 NOTE — ED NOTES
MD Tonya [place] bedside, order for BiPap. Pt is on trilogy at night at home, will do bipap to start to get the CO2 down, then will switch over to pt's home trilogy. Family member is going home to get the machine at this time.

## 2019-07-29 NOTE — PROGRESS NOTES
Initial visit by  to convey care and concern and encourage patient that  services are available if desired. Ms. [address] Melissa Mulligan appeared to be sleeping upon my arrival to her room. I provided 's business card for future reference. Chaplains remain available for support.      Angel Luis Mcmaahn 66  Board Certified

## 2019-07-29 NOTE — CONSULTS
[address] Cardiology Consult    Attending Cardiologist:Dr. Keegan Ralph    Primary Cardiologist:Dr. June Jeronimo     Primary Care Physician:Dr. Amish Sage    Subjective:     Mora Zabala is a 68 y.o. female with known hx of mech AVR, CAD, SHF, Single chamber BiotroniK ICD in place, CKD stage 4, HTN, HLP, MDS, COPD, REJI, on chronic oxygen, trilogy support at home. She also has small bowel mass with plans for near future for endoscopy. Per daughter who is nurse here states that she is scheduled to have endoscopy later this week with admission for heparin bridge prior. Daughter relates that she has been in rehab facility and has noted her mom has had increased peripheral edema for last several days. Reportedly she was found with blank stare and minimally responsive PTA. Workup demonstrated no acute neurologic insult, however with pCO2 of 99 pH 7.22. Her ICD was interrogated in ER without event. She does not recall her possible syncopal event or fall. New bruising on left arm. She denies any recent complaints of chest pain. NO ICD shocks. Noted increased BNP from baseline. INR therapeutic. Past Medical History:   Diagnosis Date    Anticoagulated on Coumadin 7/9/2013    S/P AVR     CAD (coronary artery disease) 1/20/2013 5/8/14 PCI LAD with stent placed     Cardiomyopathy ([address]     CHF (congestive heart failure) ([address] 2/17/2017    CKD (chronic kidney disease) stage 3, GFR 30-59 ml/min ([facility]) 7/10/2013    COPD (chronic obstructive pulmonary disease) ([address] 4/2/2014    Diabetes ([address]     Essential hypertension, benign 1/20/2013    HLD (hyperlipidemia)     ICD (implantable cardioverter-defibrillator) in place 10/2/2014    Biotronik single-chamber ICD implantation 10/20/14     Iron deficiency anemia due to chronic blood loss 7/29/2009    MDS (myelodysplastic syndrome) ([address] 12/17/2011    Procrit started in August, 2011 12/18/11 Procrit weekly and Iron stores.  5-12      12-13-12 good response to 3 weekly procrit did not need it last time  3-7-13 Pt doing well. Just wanted a \"check-up. \" Responding to Procrit every three weeks. 13 patient has missed some injections on recently restarted hemoglobin only issue , takes oral iron iron stores each time       REJI (obstructive sleep apnea)-cpap 2014    Osteoarthritis     Osteopenia     Pulmonary hypertension ([address] 6/15/2016    Quadrantanopia     Skin defect 2018    Visual complaint 2015      Past Surgical History:   Procedure Laterality Date    CARDIAC CATHETERIZATION      COLONOSCOPY N/A 2019    COLONOSCOPY/ BMI 40 ROOM 637 performed by Sara Miguel MD at [address] [facility], 2005    mechanical valve     HX  SECTION      HX CORONARY STENT PLACEMENT  May, 2014    STEMI with Stent placement.     HX ENDOSCOPY  2009    EGD    HX HEART CATHETERIZATION      HX PACEMAKER  10/2/2014    Biotronik ICD    HX TUBAL LIGATION      IR BX BONE MARROW DIAGNOSTIC  2011      Current Facility-Administered Medications   Medication Dose Route Frequency    warfarin (COUMADIN) tablet 5 mg  5 mg Oral QHS    carvedilol (COREG) tablet 6.25 mg  6.25 mg Oral BID WITH MEALS    aspirin delayed-release tablet 81 mg  81 mg Oral DAILY    albuterol (PROVENTIL VENTOLIN) nebulizer solution 2.5 mg  2.5 mg Nebulization Q4H PRN    acetaminophen (TYLENOL) tablet 650 mg  650 mg Oral Q6H PRN    docusate (COLACE) 50 mg/5 mL oral liquid 50 mg  50 mg Oral DAILY    fluticasone propionate (FLONASE) 50 mcg/actuation nasal spray 2 Spray  2 Spray Both Nostrils DAILY PRN    ferrous gluconate 324 mg (38 mg iron) tablet 1 Tab  1 Tab Oral BID WITH MEALS    traZODone (DESYREL) tablet 50 mg  50 mg Oral QHS PRN    sertraline (ZOLOFT) tablet 50 mg  50 mg Oral DAILY    pantoprazole (PROTONIX) tablet 40 mg  40 mg Oral ACB    simvastatin (ZOCOR) tablet 20 mg  20 mg Oral QHS    ondansetron (ZOFRAN) injection 4 mg  4 mg IntraVENous Q6H PRN    furosemide (LASIX) injection 40 mg  40 mg IntraVENous BID    docusate sodium (COLACE) capsule 100 mg  100 mg Oral PRN    budesonide (PULMICORT) 500 mcg/2 ml nebulizer suspension  500 mcg Nebulization BID RT    And    albuterol (PROVENTIL VENTOLIN) nebulizer solution 2.5 mg  2.5 mg Nebulization Q6HWA RT     Allergies   Allergen Reactions    Ferrlecit [Sodium Ferric Gluconat-Sucrose] Other (comments)     Chest pain, reported MI after administration    Sulfa (Sulfonamide Antibiotics) Hives    Aspirin Nausea Only     Cannot take uncoated aspirin      Social History     Tobacco Use    Smoking status: Former Smoker     Packs/day: 0.25     Years: 42.00     Pack years: 10.50     Types: Cigarettes     Start date: 10/1/1956     Last attempt to quit: 2014     Years since quittin.2    Smokeless tobacco: Never Used   Substance Use Topics    Alcohol use: No     Alcohol/week: 0.0 standard drinks      Family History   Problem Relation Age of Onset    Heart Disease Mother         CHF    Hypertension Mother     Kidney Disease Mother     Heart Disease Father         CHF    Lung Disease Father     Diabetes Father     Cancer Father         prostate    Hypertension Father     Heart Attack Father     Heart Disease Maternal Aunt     Diabetes Brother     Coronary Artery Disease Other     Breast Cancer Neg Hx         Review of Systems  Gen: Denies fever, chills, malaise or fatigue. Appetite good. HEENT: Denies frequent headaches, dizzyness, visual disturbances, Neck pain or swallowing difficulty  Lungs: as above   Cardiovascular: as above   GI: as above, pathology on biopsies of duodenal source were adenoma   : Denies dysuria, no complaints of frequency, nocturia  Heme: No prior bleeding disorders, no prior Cancer  Neuro: Denies prior CVA, TIA. Endocrine: no diabetes, thyroid disorders  Psychiatric: Denies anxiety, or other psychiatric illnesses.      Objective:     Visit Vitals  BP 113/67   Pulse 67   Temp 97.9 °F (36.6 °C)   Resp 16   Ht 5' 2\" (1.575 m)   Wt 99.9 kg (220 lb 4.8 oz)   SpO2 95%   BMI 40.29 kg/m²     General:Alert, cooperative, no distress, appears stated age  Head: Normocephalic, without obvious abnormality, atraumatic. Eyes: Conjunctivae/corneas clear. PERRL, EOMs intact  Nose:Nares normal. Septum midline. Mucosa normal. No drainage or sinus tenderness. Throat: Lips, mucosa, and tongue normal. Teeth and gums normal.   Neck: Supple, symmetrical, trachea midline,  no carotid bruit and no JVD. Lungs:Clear to auscultation bilaterally. Chest wall: No tenderness or deformity. Heart: Regular rate and rhythm, S1, S2 normal, no murmur, click, rub or gallop. Abdomen:Soft, non-tender. Bowel sounds normal. No masses, No organomegaly. Extremities: Extremities normal, atraumatic, no cyanosis or edema. Pulses: 2+ and symmetric all extremities.     Skin: Skin color, texture, turgor normal. No rashes or lesions  Lymph nodes: Cervical, supraclavicular, and axillary nodes normal  Neurologic:No focal deficits identified                 ECG: NSR with LAD, nonspecific IVCE    Data Review:     Recent Results (from the past 24 hour(s))   EKG, 12 LEAD, INITIAL    Collection Time: 07/28/19  4:42 PM   Result Value Ref Range    Ventricular Rate 78 BPM    Atrial Rate 78 BPM    P-R Interval 180 ms    QRS Duration 150 ms    Q-T Interval 426 ms    QTC Calculation (Bezet) 485 ms    Calculated P Axis 36 degrees    Calculated R Axis -50 degrees    Calculated T Axis 110 degrees    Diagnosis       Normal sinus rhythm  Left axis deviation  Non-specific intra-ventricular conduction block  Abnormal ECG  When compared with ECG of 06-JUN-2019 16:10,  Premature ventricular complexes are no longer Present  Confirmed by Lauryn Faustin MD (), ENDER JIMENEZ ([id number]) on 7/29/2019 8:29:10 AM     TROPONIN I    Collection Time: 07/28/19  4:46 PM   Result Value Ref Range    Troponin-I, Qt. 0.07 (H) 0.02 - 0.05 NG/ML CBC WITH AUTOMATED DIFF    Collection Time: 07/28/19  4:46 PM   Result Value Ref Range    WBC 7.1 4.3 - 11.1 K/uL    RBC 3.03 (L) 4.05 - 5.2 M/uL    HGB 8.7 (L) 11.7 - 15.4 g/dL    HCT 30.4 (L) 35.8 - 46.3 %    .3 (H) 79.6 - 97.8 FL    MCH 28.7 26.1 - 32.9 PG    MCHC 28.6 (L) 31.4 - 35.0 g/dL    RDW 15.8 (H) 11.9 - 14.6 %    PLATELET 363 804 - 879 K/uL    MPV 11.8 9.4 - 12.3 FL    ABSOLUTE NRBC 0.00 0.0 - 0.2 K/uL    DF AUTOMATED      NEUTROPHILS 77 43 - 78 %    LYMPHOCYTES 12 (L) 13 - 44 %    MONOCYTES 9 4.0 - 12.0 %    EOSINOPHILS 1 0.5 - 7.8 %    BASOPHILS 1 0.0 - 2.0 %    IMMATURE GRANULOCYTES 1 0.0 - 5.0 %    ABS. NEUTROPHILS 5.5 1.7 - 8.2 K/UL    ABS. LYMPHOCYTES 0.8 0.5 - 4.6 K/UL    ABS. MONOCYTES 0.6 0.1 - 1.3 K/UL    ABS. EOSINOPHILS 0.1 0.0 - 0.8 K/UL    ABS. BASOPHILS 0.1 0.0 - 0.2 K/UL    ABS. IMM. GRANS. 0.1 0.0 - 0.5 K/UL   METABOLIC PANEL, COMPREHENSIVE    Collection Time: 07/28/19  4:46 PM   Result Value Ref Range    Sodium 141 136 - 145 mmol/L    Potassium 4.3 3.5 - 5.1 mmol/L    Chloride 97 (L) 98 - 107 mmol/L    CO2 36 (H) 21 - 32 mmol/L    Anion gap 8 7 - 16 mmol/L    Glucose 142 (H) 65 - 100 mg/dL    BUN 53 (H) 8 - 23 MG/DL    Creatinine 2.20 (H) 0.6 - 1.0 MG/DL    GFR est AA 28 (L) >60 ml/min/1.73m2    GFR est non-AA 23 (L) >60 ml/min/1.73m2    Calcium 9.1 8.3 - 10.4 MG/DL    Bilirubin, total 0.6 0.2 - 1.1 MG/DL    ALT (SGPT) 20 12 - 65 U/L    AST (SGOT) 18 15 - 37 U/L    Alk.  phosphatase 72 50 - 136 U/L    Protein, total 7.1 6.3 - 8.2 g/dL    Albumin 3.4 3.2 - 4.6 g/dL    Globulin 3.7 (H) 2.3 - 3.5 g/dL    A-G Ratio 0.9 (L) 1.2 - 3.5     CK    Collection Time: 07/28/19  4:46 PM   Result Value Ref Range    CK 29 21 - 215 U/L   BNP    Collection Time: 07/28/19  4:46 PM   Result Value Ref Range    BNP 1,354 (H) 0 pg/mL   URINALYSIS W/ RFLX MICROSCOPIC    Collection Time: 07/28/19  5:51 PM   Result Value Ref Range    Color YELLOW      Appearance CLOUDY      Specific gravity 1.013 1.001 - 1.023      pH (UA) 5.0 5.0 - 9.0      Protein TRACE (A) NEG mg/dL    Glucose NEGATIVE  mg/dL    Ketone NEGATIVE  NEG mg/dL    Bilirubin NEGATIVE  NEG      Blood NEGATIVE  NEG      Urobilinogen 1.0 0.2 - 1.0 EU/dL    Nitrites NEGATIVE  NEG      Leukocyte Esterase NEGATIVE  NEG      RBC 0-3 0 /hpf    Epithelial cells 0-3 0 /hpf    Bacteria TRACE 0 /hpf    Casts HYALINE 0 /lpf    Amorphous Crystals 1+ (H) 0   POC G3    Collection Time: 07/28/19  7:48 PM   Result Value Ref Range    Device: NASAL CANNULA      pH (POC) 7.211 (LL) 7.35 - 7.45      pCO2 (POC) 99.5 (HH) 35 - 45 MMHG    pO2 (POC) 70 (L) 75 - 100 MMHG    HCO3 (POC) 39.9 (H) 22 - 26 MMOL/L    sO2 (POC) 88 (L) 95 - 98 %    Base excess (POC) 9 mmol/L    Allens test (POC) YES      Site LEFT RADIAL      Patient temp.  98.6      Specimen type (POC) ARTERIAL      Performed by Filemon     CO2, POC 43 MMOL/L    Flow rate (POC) 3.000 L/min    Respiratory comment: PhysicianNotified     COLLECT TIME 1,935     TROPONIN I    Collection Time: 07/28/19 10:51 PM   Result Value Ref Range    Troponin-I, Qt. 0.08 (H) 0.02 - 0.05 NG/ML   MAGNESIUM    Collection Time: 07/28/19 10:51 PM   Result Value Ref Range    Magnesium 1.8 1.8 - 2.4 mg/dL   PHOSPHORUS    Collection Time: 07/28/19 10:51 PM   Result Value Ref Range    Phosphorus 5.7 (H) 2.3 - 3.7 MG/DL   TSH 3RD GENERATION    Collection Time: 07/28/19 10:51 PM   Result Value Ref Range    TSH 1.790 0.358 - 3.740 uIU/mL   CBC W/O DIFF    Collection Time: 07/29/19  4:35 AM   Result Value Ref Range    WBC 5.5 4.3 - 11.1 K/uL    RBC 2.83 (L) 4.05 - 5.2 M/uL    HGB 8.2 (L) 11.7 - 15.4 g/dL    HCT 28.3 (L) 35.8 - 46.3 %    .0 (H) 79.6 - 97.8 FL    MCH 29.0 26.1 - 32.9 PG    MCHC 29.0 (L) 31.4 - 35.0 g/dL    RDW 15.6 (H) 11.9 - 14.6 %    PLATELET 974 (L) 495 - 450 K/uL    MPV 11.7 9.4 - 12.3 FL    ABSOLUTE NRBC 0.00 0.0 - 0.2 K/uL   PROTHROMBIN TIME + INR    Collection Time: 07/29/19  4:35 AM   Result Value Ref Range Prothrombin time 23.9 (H) 11.7 - 14.5 sec    INR 2.2     METABOLIC PANEL, BASIC    Collection Time: 07/29/19  4:35 AM   Result Value Ref Range    Sodium 144 136 - 145 mmol/L    Potassium 4.4 3.5 - 5.1 mmol/L    Chloride 100 98 - 107 mmol/L    CO2 37 (H) 21 - 32 mmol/L    Anion gap 7 7 - 16 mmol/L    Glucose 87 65 - 100 mg/dL    BUN 51 (H) 8 - 23 MG/DL    Creatinine 2.08 (H) 0.6 - 1.0 MG/DL    GFR est AA 30 (L) >60 ml/min/1.73m2    GFR est non-AA 25 (L) >60 ml/min/1.73m2    Calcium 8.7 8.3 - 10.4 MG/DL   TROPONIN I    Collection Time: 07/29/19  4:35 AM   Result Value Ref Range    Troponin-I, Qt. 0.06 (H) 0.02 - 0.05 NG/ML   EKG, 12 LEAD, INITIAL    Collection Time: 07/29/19  6:54 AM   Result Value Ref Range    Ventricular Rate 67 BPM    Atrial Rate 67 BPM    P-R Interval 152 ms    QRS Duration 146 ms    Q-T Interval 444 ms    QTC Calculation (Bezet) 469 ms    Calculated P Axis 18 degrees    Calculated R Axis -52 degrees    Calculated T Axis 120 degrees    Diagnosis       Sinus rhythm with occasional Premature ventricular complexes  Left axis deviation  Non-specific intra-ventricular conduction block  Abnormal ECG  When compared with ECG of 28-JUL-2019 16:42,  Premature ventricular complexes are now Present  T wave inversion more evident in Anterolateral leads  Confirmed by Alvaro Arreola MD (), ENDER JIMENEZ ([id number]) on 7/29/2019 9:18:06 AM           Assessment / Plan     Principal Problem:    Hypercapnic respiratory failure (HCC) (7/29/2019)--with noted CO2, ? Compliance with Trilogy     Active Problems:    Duodenal Adenoma--may need to discuss with GI plans for intervention and timing of heparin bridge. MDS (myelodysplastic syndrome) (HCC) (12/17/2011)--monitor counts closely. Overview: Procrit started in August, 2011 12/18/11 Procrit weekly and Iron stores. 5-12 12-13-12 good response to 3 weekly procrit did not need it last time            3-7-13 Pt doing well.  Just wanted a \"check-up. \" Responding to Procrit       every three weeks. 8-29-13 patient has missed some injections on recently restarted       hemoglobin only issue , takes oral iron iron stores each time             CAD (coronary artery disease) (1/20/2013)--no cp      Overview: 5/8/14 PCI LAD with stent placed      Essential hypertension, benign (1/20/2013)      CKD (chronic kidney disease) stage 4, GFR 15-29 ml/min ([facility]) (7/10/2013)--monitor closely with IV diuresis       COPD (chronic obstructive pulmonary disease) ([facility] (4/2/2014)      Overview: HOME OXYGEN 3 LITERS      REJI (obstructive sleep apnea)-cpap (4/2/2014)      ICD (implantable cardioverter-defibrillator) in place (10/2/2014)      Overview: Biotronik single-chamber ICD implantation 10/20/14      HLD (hyperlipidemia) ()      S/P AVR (aortic valve replacement) ()      Overview: Mechanical/Artific      Cardiomyopathy ([facility] ()      Type 2 diabetes mellitus with nephropathy ([facility] (61/94/5645)      Systolic CHF, acute on chronic (HCC) (12/31/2018)--agree with IV diuresis, NO ACE or ARB with renal function. She has had worsening renal function with aggressive diuresis in past.           Addendum: Dr. Bhavya Pearce plans for endoscopy later in week. Will stop coumadin and start IV heparin infusion.  Monitor hgb closely      Juan Alberto Heart, NP

## 2019-07-29 NOTE — ED NOTES
TRANSFER - OUT REPORT:    Verbal report given to Jonathan Wheatley RN(name) on [employer]  being transferred to [facility] (unit) for routine progression of care       Report consisted of patients Situation, Background, Assessment and   Recommendations(SBAR). Information from the following report(s) SBAR, ED Summary and MAR was reviewed with the receiving nurse. Lines:   Peripheral IV 07/28/19 Left Antecubital (Active)   Site Assessment Clean, dry, & intact 7/28/2019  4:45 PM   Phlebitis Assessment 0 7/28/2019  4:45 PM   Infiltration Assessment 0 7/28/2019  4:45 PM   Dressing Status Clean, dry, & intact 7/28/2019  4:45 PM   Hub Color/Line Status Pink 7/28/2019  4:45 PM        Opportunity for questions and clarification was provided.       Patient transported with:   Registered Nurse

## 2019-07-29 NOTE — Clinical Note
See note. She was admitted earlier for anemia, sent to [facility], discharged 7/23. Thank for for following her while IP.

## 2019-07-30 NOTE — PROGRESS NOTES
's follow-up visit requested by staff. Ms. Korin Estevez was asleep upon my arrival and she quickly awoke. I conveyed care and concern to her, empathically listened and explored coping skills. Ms. Korin Estevez voiced no spiritual/emotional concerns to me; however, she had been asleep prior to my arrival. Chaplains remain available for support.         Angel Luis [place] 66  Board Certified

## 2019-07-30 NOTE — PROGRESS NOTES
IV heparin rate has been adjusted based on the most recent PTT results. Lab Results   Component Value Date/Time    aPTT 168.5 (HH) 07/30/2019 03:54 AM   Heparin drip held x 1hour at 0449. Erika Sigala To be  Restaed at 6 units/kg/hr at 0549.  Next PTT 11am.

## 2019-07-30 NOTE — PROGRESS NOTES
IV heparin rate has been adjusted based on the most recent PTT results.     Lab Results   Component Value Date/Time    aPTT 177.3 (HH) 07/29/2019 07:44 PM   Heparin drip stopped for 1 hour per protocol and restarted at 2144 at 9 units/kg/hr (decreased form 12 units/kg/hr.) Next PTT 3am.

## 2019-07-30 NOTE — PROGRESS NOTES
Problem: Mobility Impaired (Adult and Pediatric)  Goal: *Acute Goals and Plan of Care (Insert Text)  Description  LTG:  (1.)Ms. Beny Gómez will move from supine to sit and sit to supine , scoot up and down and roll side to side in bed with MODIFIED INDEPENDENCE within 7 treatment day(s). (2.)Ms. Beny Gómez will transfer from bed to chair and chair to bed with STAND BY ASSIST using the least restrictive device within 7 treatment day(s). (3.)Ms. Beny Gómez will ambulate with STAND BY ASSIST for 50 feet with the least restrictive device within 7 treatment day(s). ________________________________________________________________________________________________     Outcome: Progressing Towards Goal     PHYSICAL THERAPY: Initial Assessment and Daily Note 7/30/2019  INPATIENT: PT Visit Days : 1  [place] / [place] / Product Type: Medicare /       NAME/AGE/GENDER: Jassi Garibay is a 68 y.o. female   PRIMARY DIAGNOSIS: CHF exacerbation ([facility] [address] [I50.9] Hypercapnic respiratory failure (HCC)   Hypercapnic respiratory failure (HCC)          ICD-10: Treatment Diagnosis:    Other abnormalities of gait and mobility (R26.89)  History of falling (Z91.81)   Precaution/Allergies:  Ferrlecit [sodium ferric gluconat-sucrose]; Sulfa (sulfonamide antibiotics); and Aspirin      ASSESSMENT:     Ms. Beny Gómez presents supine in bed and agreeable for therapy assessment. Patient reports that at baseline she lives alone and ambulates with rollator independently in home, has CLTC to assist with ADL's. Was undergoing rehab prior to admission. OT assisted patient to restroom. Patient then stood with min to mod assist and ambulated 10' to recliner with min HHA. B LE strength WFL, however, R UE nonfunctional and painful with all movement. Patient tends to hold R UE in adducted and internally rotated position. Patient has declined in functional mobility.   Ms. Beny Gómez would benefit from skilled physical therapy (medically necessary) to address her deficits and maximize her function. Initiated treatment to include LE exercises. Checked SpO2 (on 3L) and it was 84%. Patient cued to perform pursed lip breathing and SpO2 increased to 90's. This section established at most recent assessment   PROBLEM LIST (Impairments causing functional limitations):  Decreased Strength  Decreased ADL/Functional Activities  Decreased Transfer Abilities  Decreased Ambulation Ability/Technique  Decreased Balance  Decreased Activity Tolerance  Decreased Pacing Skills  Increased Shortness of Breath  Decreased [place] with Home Exercise Program   INTERVENTIONS PLANNED: (Benefits and precautions of physical therapy have been discussed with the patient.)  Balance Exercise  Bed Mobility  Gait Training  Home Exercise Program (HEP)  Therapeutic Activites  Therapeutic Exercise/Strengthening  Transfer Training  education      TREATMENT PLAN: Frequency/Duration: 3 times a week for duration of hospital stay  Rehabilitation Potential For Stated Goals: [facility] (at time of discharge pending progress):    Placement: It is my opinion, based on this patient's performance to date, that Ms. Diaz may benefit from participating in 1-2 additional therapy sessions in order to continue to assess for rehab potential and then make recommendation for disposition at discharge. Equipment:   None at this time              HISTORY:   History of Present Injury/Illness (Reason for Referral):  Per MD note, \"Patient is a 69 y/o female with hx CAD, s/p AVR, on warfarin, anemia and MDS, ICD, COPD, CHF, CKD, pulmonary HTN, REJI-uses trilogy CAPAP who presents to ED after a suspected syncopal episode or fall at home. She has bruising on left arm. Family found patient lethargic on the bed, staring off into space and not very responsive. She was still breathing. Usually uses O2 at 3 L. . Patient does not recall any event.  She also has increased swelling to her legs. Workup in ED shows a BNP of 1354 and chest xray with worsening mild interstitial edema. Head CT is negative. ABG with respiratory acidosis with a pH of 7.211 and CO2 99.5. PO2 is 70. Will admit for further treatment of acute CHF and hypercarbic respiratory failure. \"  Past Medical History/Comorbidities:   Ms. Diaz  has a past medical history of Anticoagulated on Coumadin (2013), CAD (coronary artery disease) (2013), Cardiomyopathy ([facility] [address] CHF (congestive heart failure) ([facility] [address] (2017), CKD (chronic kidney disease) stage 3, GFR 30-59 ml/min ([facility]) (7/10/2013), COPD (chronic obstructive pulmonary disease) ([address] (2014), Diabetes ([address] Essential hypertension, benign (2013), HLD (hyperlipidemia), ICD (implantable cardioverter-defibrillator) in place (10/2/2014), Iron deficiency anemia due to chronic blood loss (2009), MDS (myelodysplastic syndrome) ([facility] [address] (2011), REJI (obstructive sleep apnea)-cpap (2014), Osteoarthritis, Osteopenia, Pulmonary hypertension ([address] (6/15/2016), Quadrantanopia, Skin defect (2018), and Visual complaint (2015). She also has no past medical history of Difficult intubation, Malignant hyperthermia due to anesthesia, Nausea & vomiting, Pseudocholinesterase deficiency, or Unspecified adverse effect of anesthesia. Ms. Diaz  has a past surgical history that includes cardiac catheterization (); ir bx bone marrow diagnostic (2011); hx aortic valve replacement (, ); hx  section; hx tubal ligation; hx heart catheterization (); hx coronary stent placement (May, 2014); hx pacemaker (10/2/2014); hx endoscopy (2009); and colonoscopy (N/A, 2019).   Social History/Living Environment:   Home Environment: Private residence  # Steps to Enter: 6  One/Two Story Residence: One story  Living Alone: Yes  Support Systems: Family member(s)  Patient Expects to be Discharged to[employer] residence  Current DME Used/Available at Home: Blood pressure cuff, CPAP, Glucometer, Nebulizer, Oxygen, portable, Walker, Wheelchair, Other (comment)(Trilogy O2 machine)  Prior Level of Function/Work/Activity:  Lives alone, CLTC for ADL's. Ambulates with rollator independently in home        Number of Personal Factors/Comorbidities that affect the Plan of Care: 3+: HIGH COMPLEXITY   EXAMINATION:   Most Recent Physical Functioning:   Gross Assessment:  AROM: Generally decreased, functional  Strength: Generally decreased, functional               Posture:  Posture (WDL): Exceptions to WDL  Posture Assessment: Forward head, Rounded shoulders  Balance:  Sitting: Intact  Standing: Impaired  Standing - Static: Fair  Standing - Dynamic : Poor Bed Mobility:  Scooting: Minimum assistance; Moderate assistance(short sitting)  Wheelchair Mobility:     Transfers:  Sit to Stand: Minimum assistance  Stand to Sit: Contact guard assistance  Gait:     Base of Support: Widened  Speed/Winifred: Slow  Step Length: Right shortened;Left shortened  Gait Abnormalities: Altered arm swing;Decreased step clearance;Trunk sway increased  Distance (ft): 10 Feet (ft)  Assistive Device: Other (comment)(hand hold assist)  Ambulation - Level of Assistance: Minimal assistance  Interventions: Safety awareness training;Verbal cues      Body Structures Involved:  Lungs  Metabolic  Muscles Body Functions Affected:  Sensory/Pain  Respiratory  Movement Related  Metobolic/Endocrine Activities and Participation Affected: Mobility  Self Care  Domestic Life   Number of elements that affect the Plan of Care: 4+: HIGH COMPLEXITY   CLINICAL PRESENTATION:   Presentation: Evolving clinical presentation with changing clinical characteristics: MODERATE COMPLEXITY   CLINICAL DECISION MAKIN [facility] Inpatient Short Form  How much difficulty does the patient currently have. .. Unable A Lot A Little None   1.   Turning over in bed (including adjusting bedclothes, sheets and blankets)? ? 1   ? 2   ? 3   ? 4   2. Sitting down on and standing up from a chair with arms ( e.g., wheelchair, bedside commode, etc.)   ? 1   ? 2   ? 3   ? 4   3. Moving from lying on back to sitting on the side of the bed?   ? 1   ? 2   ? 3   ? 4   How much help from another person does the patient currently need. .. Total A Lot A Little None   4. Moving to and from a bed to a chair (including a wheelchair)? ? 1   ? 2   ? 3   ? 4   5. Need to walk in hospital room? ? 1   ? 2   ? 3   ? 4   6. Climbing 3-5 steps with a railing? ? 1   ? 2   ? 3   ? 4   © 2007, Trustees of [address] [place] [address] [address] [id number], under license to [employer] All rights reserved      Score:  Initial: 15 Most Recent: X (Date: -- )    Interpretation of Tool:  Represents activities that are increasingly more difficult (i.e. Bed mobility, Transfers, Gait). Medical Necessity:     Patient is expected to demonstrate progress in strength, balance, functional technique and activity tolerance    to decrease assistance required with all functional mobility. .  Reason for Services/Other Comments:  Patient continues to require skilled intervention due to medical complications and decline in functional mobility   . Use of outcome tool(s) and clinical judgement create a POC that gives a: Questionable prediction of patient's progress: MODERATE COMPLEXITY            TREATMENT:   (In addition to Assessment/Re-Assessment sessions the following treatments were rendered)   Pre-treatment Symptoms/Complaints:  tired. Pain: Initial:   Pain Intensity 1: 0  Post Session:  5 with movement of L [facility]-RN notified     Therapeutic Exercise: ( 8 minutes):  Exercises per grid below to improve strength and coordination. Required minimal visual and verbal cues to promote proper body breathing techniques. Progressed complexity of movement as indicated.   Educated patient in pursed lip breathing and she practiced with improvement in SpO2. DATE: 7/30/19       Ambulation        Hip Flexion X15 AB       Long Arc Quads X15 AB       Knee Squeezes        Ankle DF/PF                                         Key:  A=active, AA=active assisted, P=passive, B=bilaterally, R=right, L=left   DF=dorsiflexion, PF=plantarflexion      Braces/Orthotics/Lines/Etc:   IV  medina catheter  O2 Device: Nasal cannula  Treatment/Session Assessment:    Response to Treatment:  pleasant and cooperative. Interdisciplinary Collaboration:   Physical Therapist  Occupational Therapist  Registered Nurse  NP   After treatment position/precautions:   Up in chair  Bed/Chair-wheels locked  Caregiver at bedside  Call light within reach  RN notified   Compliance with Program/Exercises: Compliant all of the time, Will assess as treatment progresses  Recommendations/Intent for next treatment session: \"Next visit will focus on advancements to more challenging activities and reduction in assistance provided\".   Total Treatment Duration:  PT Patient Time In/Time Out  Time In: 1135  Time Out: [address], PT, DPT

## 2019-07-30 NOTE — ROUTINE PROCESS
Pt expressed wishes to be DNR, start Palliative Care in anticipation of Hospice at some point. Also states regardless of results of tomorrow's endoscopy she will not want any treatment (surgical or non surgical).  Pt denies CP, n/v/d, abd pain.    Per IM notes. Awaiting PC consult.

## 2019-07-30 NOTE — PROGRESS NOTES
Problem: Self Care Deficits Care Plan (Adult)  Goal: *Acute Goals and Plan of Care (Insert Text)  Description  1. Patient will perform bathing with moderate assistance within 7 days with equipment as needed. 2.  Patient will perform toilet transfer with supervision within 7 days with equipment as needed. 3.  Patient will perform upper body dressing with minimal assistance and lower body dressing with moderate assistance within 7 days with equipment as needed. 4.   Patient will perform toileting with moderate assistance within 7 days with equipment as needed. 5.   Pt will participate in B UE therapeutic exercises for 8 minutes with 3 rest breaks within 7 days. 6.  Pt and or caregiver to demonstrate and verbalize good understanding of recommendations for increasing safety with functional tasks within 7 days. Outcome: Progressing Towards Goal     OCCUPATIONAL THERAPY: Initial Assessment and AM 7/30/2019  INPATIENT: OT Visit Days: 1  [place] / [place] / Product Type: Medicare /      NAME/AGE/GENDER: Che Keys is a 68 y.o. female   PRIMARY DIAGNOSIS:  [facility] exacerbation ([facility] [I50.9] Hypercapnic respiratory failure (HCC)   Hypercapnic respiratory failure (HCC)          ICD-10: Treatment Diagnosis:    · Stiffness of Left Shoulder, Not elsewhere classified (M25.612)  · Pain in Right Shoulder (M25.511)  · Stiffness of Right Shoulder, Not elsewhere classified (M25.611)  · Repeated Falls (R29.6)  · History of falling (Z91.81)  · Localized edema (R60.1)   Precautions/Allergies:     Ferrlecit [sodium ferric gluconat-sucrose]; Sulfa (sulfonamide antibiotics); and Aspirin      ASSESSMENT:     Ms. Diaz presents supine in bed, alert. Patient is a very pleasant lady, and she is A & O to person & place. Patient is known to OT, and she was admitted from [facility]. Patient admitted due to CHF exacerbation, and she has had a history of falling.   Patient stated that she has R shoulder pain that began 6 months ago, and that she injured it again in a fall in June 2019. Patient stated that she was able to ambulated short distances hand held or using rollator prior to admit, and she needed assistance with bathing & dressing. Patient is R hand dominant. Patient with mild edema R hand. Patient has limited R shoulder scaption, able to lift approximately 35 degrees with pain, and she is unable to lift her R shoulder in shoulder flexion plane against gravity. Patient able to flex R elbow to almost 90 degrees with R shoulder internally rotated only. Patient unable to supinate R forearm. L shoulder AROM limited, though rest of L UE AROM grossly functional.  Patient sat on edge of bed with moderate assistance, and she stood with minimal assistance. Note: Patient unable to use walker due to R UE pain, and her R UE tensed up to look similar to a flexor synergy pattern when standing. Patient ambulated to bathroom handheld assist with minimal assistance. Patient sat on commode with minimal assistance, and she required total assistance for toileting. Patient stood from commode at lower height with moderate assistance. Patient functioning below baseline, and patient to benefit from Occupational Therapy to maximize ADL performance. Cont OT per tx plan. This section established at most recent assessment   PROBLEM LIST (Impairments causing functional limitations):  1. Decreased Strength  2. Decreased ADL/Functional Activities  3. Decreased Transfer Abilities  4. Decreased Ambulation Ability/Technique  5. Decreased Balance  6. Increased Pain  7. Decreased Activity Tolerance  8. Increased Fatigue  9. Increased Shortness of Breath  10. Decreased Flexibility/Joint Mobility  11. Edema/Girth  12. Decreased [place] with Home Exercise Program  13.  Decreased Cognition   INTERVENTIONS PLANNED: (Benefits and precautions of occupational therapy have been discussed with the patient.)  1. Activities of daily living training  2. Adaptive equipment training  3. Balance training  4. Clothing management  5. Cognitive training  6. Donning&doffing training  7. Hygiene training  8. Medication management training  9. Neuromuscular re-eduation  10. Re-evaluation  11. Sensory reintegration training  12. Therapeutic activity  13. Therapeutic exercise     TREATMENT PLAN: Frequency/Duration: Follow patient 3x's/wk to address above goals. Rehabilitation Potential For Stated Goals: Good     REHAB RECOMMENDATIONS (at time of discharge pending progress):    Placement: It is my opinion, based on this patient's performance to date, that Ms. Diaz may benefit from participating in 1-2 additional therapy sessions in order to continue to assess for rehab potential and then make recommendation for disposition at discharge. Equipment:    None at this time              OCCUPATIONAL PROFILE AND HISTORY:   History of Present Injury/Illness (Reason for Referral):  Patient is a 69 y/o female with hx CAD, s/p AVR, on warfarin, anemia and MDS, ICD, COPD, CHF, CKD, pulmonary HTN, REJI-uses trilogy CAPAP who presents to ED after a suspected syncopal episode or fall at home. She has bruising on left arm. Family found patient lethargic on the bed, staring off into space and not very responsive. She was still breathing. Usually uses O2 at 3 L. . Patient does not recall any event. She also has increased swelling to her legs. Workup in ED shows a BNP of 1354 and chest xray with worsening mild interstitial edema. Head CT is negative. ABG with respiratory acidosis with a pH of 7.211 and CO2 99.5. PO2 is 70. Will admit for further treatment of acute CHF and hypercarbic respiratory failure.       Past Medical History/Comorbidities:   Ms. Diaz  has a past medical history of Anticoagulated on Coumadin (7/9/2013), CAD (coronary artery disease) (1/20/2013), Cardiomyopathy [facility]), [facility]est[facility] (2017), CKD (chronic kidney disease) stage 3, GFR 30-59 ml/min ([facility]) (7/10/2013), COPD (chronic obstructive pulmonary disease) ([facility] (2014), Diabetes ([facility] Essential hypertension, benign (2013), HLD (hyperlipidemia), ICD (implantable cardioverter-defibrillator) in place (10/2/2014), Iron deficiency anemia due to chronic blood loss (2009), MDS (myelodysplastic syndrome) ([facility] (2011), REJI (obstructive sleep apnea)-cpap (2014), Osteoarthritis, Osteopenia, Pulmonary hypertension ([facility] (6/15/2016), Quadrantanopia, Skin defect (2018), and Visual complaint (2015). She also has no past medical history of Difficult intubation, Malignant hyperthermia due to anesthesia, Nausea & vomiting, Pseudocholinesterase deficiency, or Unspecified adverse effect of anesthesia. Ms. Lyly Hoff  has a past surgical history that includes cardiac catheterization (); ir bx bone marrow diagnostic (2011); hx aortic valve replacement (, ); hx  section; hx tubal ligation; hx heart catheterization (); hx coronary stent placement (May, 2014); hx pacemaker (10/2/2014); hx endoscopy (2009); and colonoscopy (N/A, 2019). Social History/Living Environment:   Home Environment: Private residence  # Steps to Enter: 6  One/Two Story Residence: One story  Living Alone: Yes  Support Systems: Family member(s)  Patient Expects to be Discharged to[de-identified] Private residence  Current DME Used/Available at Home: Blood pressure cuff, CPAP, Wheelchair, Walker, rollator, Oxygen, portable, Nebulizer, Glucometer, Grab bars(pt has stool in shower)  Tub or Shower Type: Tub  Prior Level of Function/Work/Activity:  Patient is known to OT, and she was admitted from [address] Patient admitted due to CHF exacerbation, and she has had a history of falling. Patient stated that she has R shoulder pain that began 6 months ago, and that she injured it again in a fall in 2019.   Patient stated that she was able to ambulated short distances hand held or using rollator prior to admit, and she needed assistance with bathing & dressing. Prior to patient's rehab admission, patient lived alone with caregiver to assist her 3 hours a day with bathing, and higher level ADLs. Number of Personal Factors/Comorbidities that affect the Plan of Care: Expanded review of therapy/medical records (1-2):  MODERATE COMPLEXITY   ASSESSMENT OF OCCUPATIONAL PERFORMANCE[de-identified]   Activities of Daily Living:   Basic ADLs (From Assessment) Complex ADLs (From Assessment)   Feeding: Minimum assistance(pt has to feed self w non domin L UE)  Oral Facial Hygiene/Grooming: Maximum assistance(due to R UE pain)  Bathing: Maximum assistance  Upper Body Dressing: Maximum assistance  Lower Body Dressing: Total assistance  Toileting: Total assistance Instrumental ADL  Meal Preparation: Total assistance  Homemaking: Total assistance  Medication Management: Total assistance   Grooming/Bathing/Dressing Activities of Daily Living     Cognitive Retraining  Safety/Judgement: Awareness of environment; Fall prevention                 Functional Transfers  Toilet Transfer : Moderate assistance     Bed/Mat Mobility  Supine to Sit: Moderate assistance  Sit to Stand: Minimum assistance  Stand to Sit: Contact guard assistance  Scooting: Minimum assistance; Moderate assistance(short sitting)     Most Recent Physical Functioning:   Gross Assessment:                  Posture:  Posture (WDL): Exceptions to WDL  Posture Assessment: Forward head, Rounded shoulders  Balance:  Sitting: Intact  Standing: Impaired  Standing - Static: Fair  Standing - Dynamic : Poor Bed Mobility:  Supine to Sit: Moderate assistance  Scooting: Minimum assistance; Moderate assistance(short sitting)  Wheelchair Mobility:     Transfers:  Sit to Stand: Minimum assistance  Stand to Sit: Contact guard assistance            Patient Vitals for the past 6 hrs:   BP SpO2 O2 Flow Rate (L/min) Pulse   07/30/19 1114 124/80 98 %  70   19 1425   3 l/min    19 1429  96 % 3 l/min        Mental Status  Neurologic State: Alert  Orientation Level: Oriented to person, Oriented to place  Cognition: Follows commands  Perseveration: No perseveration noted  Safety/Judgement: Awareness of environment, Fall prevention                          Physical Skills Involved:  1. Range of Motion  2. Balance  3. Strength  4. Activity Tolerance  5. Fine Motor Control  6. Gross Motor Control  7. Pain (acute)  8. Pain (Chronic)  9. Edema Cognitive Skills Affected (resulting in the inability to perform in a timely and safe manner):  1. Executive Function  2. Divided Attention Psychosocial Skills Affected:  1. Habits/Routines  2. Social Interaction   Number of elements that affect the Plan of Care: 5+:  HIGH COMPLEXITY   CLINICAL DECISION MAKIN[address] [place] [id number] AM-PAC 6 Clicks   Daily Activity Inpatient Short Form  How much help from another person does the patient currently need. .. Total A Lot A Little None   1. Putting on and taking off regular lower body clothing? ? 1   ? 2   ? 3   ? 4   2. Bathing (including washing, rinsing, drying)? ? 1   ? 2   ? 3   ? 4   3. Toileting, which includes using toilet, bedpan or urinal?   ? 1   ? 2   ? 3   ? 4   4. Putting on and taking off regular upper body clothing? ? 1   ? 2   ? 3   ? 4   5. Taking care of personal grooming such as brushing teeth? ? 1   ? 2   ? 3   ? 4   6. Eating meals? ? 1   ? 2   ? 3   ? 4   © 2007, Trustees of [address] [place] [id number], under license to [employer]. All rights reserved      Score:  Initial: 11 Most Recent: X (Date: -- )    Interpretation of Tool:  Represents activities that are increasingly more difficult (i.e. Bed mobility, Transfers, Gait). Medical Necessity:     · Patient demonstrates good  ·  rehab potential due to higher previous functional level.   Reason for Services/Other Comments:  · Patient continues to require skilled intervention due to patient's inability to take care of self   · . Use of outcome tool(s) and clinical judgement create a POC that gives a: MODERATE COMPLEXITY         TREATMENT:   (In addition to Assessment/Re-Assessment sessions the following treatments were rendered)     Pre-treatment Symptoms/Complaints:    Pain: Initial:   Pain Intensity 1: 6  Pain Location 1: Shoulder  Pain Orientation 1: Posterior, Anterior  Pain Intervention(s) 1: Emotional support, Repositioned 6 Post Session:  2     Self Care: (10 minutes): Procedure(s) (per grid) utilized to improve and/or restore self-care/home management as related to toileting. Required maximal verbal and physical assistance  cueing to facilitate activities of daily living skills and compensatory activities. At this time, patient is appropriate for Co-treatment with physical therapy due to patient's decreased overall endurance/tolerance levels and cognition, as well as need for high level assistance to complete functional transfers/mobility and functional tasks. Patient is appropriate for a multidisciplinary co-treatment of PT and OT to address goals of both disciplines. Braces/Orthotics/Lines/Etc:   · IV  · medina catheter  · O2 Device: Nasal cannula  Treatment/Session Assessment:    · Response to Treatment:  positive  · Interdisciplinary Collaboration:   o Physical Therapist  o Registered Nurse  o Nurse Practitioner   · After treatment position/precautions:   o Up in chair  o Bed alarm/tab alert on  o Bed/Chair-wheels locked  o Call light within reach  o RN notified   · Compliance with Program/Exercises: Compliant all of the time. · Recommendations/Intent for next treatment session: \"Next visit will focus on advancements to more challenging activities and reduction in assistance provided\".   Total Treatment Duration:  OT Patient Time In/Time Out  Time In: 1135  Time Out: Joaquin 30, OT

## 2019-07-30 NOTE — PROGRESS NOTES
[facility] Progress Note         Admit Date:  7/28/2019    Today's Date:  7/30/2019    CC:  Anemia, duodenal lesion    Subjective:     Patient remains in floor bed with conversion from Coumadin to IV heparin on Monday. No stool since yesterday. Slept better last night with mask she uses at home. Hungry - waiting for breakfast.    Medications:   Current Facility-Administered Medications   Medication Dose Route Frequency    tuberculin injection 5 Units  5 Units IntraDERMal ONCE    heparin 25,000 units in dextrose 500 mL infusion  12-25 Units/kg/hr IntraVENous TITRATE    traMADol (ULTRAM) tablet 50 mg  50 mg Oral Q6H PRN    carvedilol (COREG) tablet 6.25 mg  6.25 mg Oral BID WITH MEALS    aspirin delayed-release tablet 81 mg  81 mg Oral DAILY    albuterol (PROVENTIL VENTOLIN) nebulizer solution 2.5 mg  2.5 mg Nebulization Q4H PRN    acetaminophen (TYLENOL) tablet 650 mg  650 mg Oral Q6H PRN    docusate (COLACE) 50 mg/5 mL oral liquid 50 mg  50 mg Oral DAILY    fluticasone propionate (FLONASE) 50 mcg/actuation nasal spray 2 Spray  2 Spray Both Nostrils DAILY PRN    ferrous gluconate 324 mg (38 mg iron) tablet 1 Tab  1 Tab Oral BID WITH MEALS    traZODone (DESYREL) tablet 50 mg  50 mg Oral QHS PRN    sertraline (ZOLOFT) tablet 50 mg  50 mg Oral DAILY    pantoprazole (PROTONIX) tablet 40 mg  40 mg Oral ACB    simvastatin (ZOCOR) tablet 20 mg  20 mg Oral QHS    ondansetron (ZOFRAN) injection 4 mg  4 mg IntraVENous Q6H PRN    furosemide (LASIX) injection 40 mg  40 mg IntraVENous BID    docusate sodium (COLACE) capsule 100 mg  100 mg Oral PRN    budesonide (PULMICORT) 500 mcg/2 ml nebulizer suspension  500 mcg Nebulization BID RT    And    albuterol (PROVENTIL VENTOLIN) nebulizer solution 2.5 mg  2.5 mg Nebulization Q6HWA RT       Review of Systems:  ROS was obtained, with pertinent positives as listed above. No chest pain or SOB.     Diet:  Cardiac regular    Objective: Vitals:  Visit Vitals  /69   Pulse 75   Temp 98.8 °F (37.1 °C)   Resp 18   Ht 5' 2\" (1.575 m)   Wt 102.3 kg (225 lb 9.6 oz)   SpO2 97%   BMI 41.26 kg/m²     Intake/Output:  No intake/output data recorded. 07/28 1901 - 07/30 0700  In: 1133 [P.O.:830; I.V.:303]  Out: 1575 [Urine:1575]  Exam:  General appearance: alert, cooperative, no distress  Lungs: clear to auscultation bilaterally anteriorly;O2 per nasal cannula 3L/min  Heart: regular rate and rhythm  Abdomen: soft, full and non-tender. Bowel sounds normal. No masses, no organomegaly  Extremities: extremities normal, atraumatic, no cyanosis or edema  Neuro:  alert and oriented    Data Review (Labs):    Recent Labs     07/30/19  0354 07/29/19  1944 07/29/19  0435 07/28/19  2251 07/28/19  1646   WBC 6.2  --  5.5  --  7.1   HGB 8.0*  --  8.2*  --  8.7*   HCT 26.6*  --  28.3*  --  30.4*   *  --  117*  --  151   MCV 94.3  --  100.0*  --  100.3*     --  144  --  141   K 3.7  --  4.4  --  4.3   CL 97*  --  100  --  97*   CO2 40*  --  37*  --  36*   BUN 52*  --  51*  --  53*   CREA 2.15*  --  2.08*  --  2.20*   CA 8.9  --  8.7  --  9.1   MG  --   --   --  1.8  --    *  --  87  --  142*   AP 63  --   --   --  72   SGOT 13*  --   --   --  18   ALT 15  --   --   --  20   TBILI 0.7  --   --   --  0.6   ALB 2.8*  --   --   --  3.4   TP 6.2*  --   --   --  7.1   PTP 22.3*  --  23.9*  --   --    INR 2.0  --  2.2  --   --    APTT 168.5* 177.3*  --   --   --        Assessment:     Principal Problem:    Hypercapnic respiratory failure (HCC) (7/29/2019)    Active Problems:    MDS [facility]elodysplastic syndrome) ([facility] (12/17/2011)      Overview: Procrit started in August, 2011 12/18/11 Procrit weekly and Iron stores. 5-12 12-13-12 good response to 3 weekly procrit did not need it last time            3-7-13 Pt doing well. Just wanted a \"check-up. \" Responding to Procrit       every three weeks.       8-29-13 patient has missed some injections on recently restarted       hemoglobin only issue , takes oral iron iron stores each time             CAD (coronary artery disease) (1/20/2013)      Overview: 5/8/14 PCI LAD with stent placed      Essential hypertension, benign (1/20/2013)      CKD (chronic kidney disease) stage 4, GFR 15-29 ml/min ([facility]) (7/10/2013)      COPD (chronic obstructive pulmonary disease) ([address] (4/2/2014)      Overview: HOME OXYGEN 3 LITERS      REJI (obstructive sleep apnea)-cpap (4/2/2014)      ICD (implantable cardioverter-defibrillator) in place (10/2/2014)      Overview: Biotronik single-chamber ICD implantation 10/20/14      HLD (hyperlipidemia) ()      S/P AVR (aortic valve replacement) ()      Overview: Mechanical/Artific      Cardiomyopathy ([address] ()      Type 2 diabetes mellitus with nephropathy ([address] (26/06/2137)      Systolic CHF, acute on chronic ([address] (12/31/2018)      CHF exacerbation ([address] (7/28/2019)        Plan:     68 y.o. female with pmh CKD stage IV, COPD, myelodysplasic syndrome w anemia, HTN, HLD, CAD w cardiac arrest, mechanical AVR on Coumadin with ECHO Nov 2018 w EF 25% w severe hypokinesis, and MR s/p Biotronik ICD, admitted w acute on chronic CHF. EGD/COLO June 11, 2019 w findings of IH, tics, colon polyps, gastritis and duodenal mass w ulceration which was thought to be sources of pt anemia. Duodenal mass pathology came back as adenomatous polyp. She was originally scheduled for heparin window and EGD w EMR on 8/2 for duodenal mass with ulceration but was admitted 7/28 with SOB, edema and altered mental status. Improved with IV Lasix bid. Now on heparin in anticipation of EGD later this week when INR down. 1.  Follow INR  2. Make NPO at midnight in anticipation that INR will be amenable to EGD on WED   3. Following        Patient is seen and examined in collaboration with Dr. Landon Fleming. Assessment and plan as per Dr. Landon Fleming.   Sergei Tavarez NP

## 2019-07-30 NOTE — CONSULTS
Palliative Care    Patient: Ermias Espinoza MRN: [id number]  SSN: [id number]    YOB: 1950  Age: 68 y.o. Sex: female       Date of Request: 7/29/19  Date of Consult:  7/30/2019  Reason for Consult:  advanced care plan planning/end of life  Requesting Physician: Hospitalist DOROTA Mireles     Assessment/Plan:     Principal Diagnosis:    Respiratory Failure, Acute on Chronic  J96.20    Additional Diagnoses:   · Advance Care Planning Counseling Z71.89  · Debility, Unspecified  R53.81  · Dyspnea  R06.00  · Edema  R60.9  · Fatigue, Lethargy  R53.83  · Pain, limb  M79.609  · Counseling, Encounter for Medical Advice  Z71.9  · Encounter for Palliative Care  Z51.5    Palliative Performance Scale (PPS):  PPS: 40    Medical Decision Making:   Reviewed and summarized notes from admission to present. Discussed case with appropriate providers: KENZIE Meier  Reviewed laboratory and x-ray data from admission to present. Pt resting in bed, no visitors at bedside. Pt endorses pain in R upper arm, along length of bicep muscle. The pt keeps the right arm very still. When I asked her to raise it, she responded, 'Do I have to?\", and told me she could raise it about 1 foot. Ms. Tyra Ogden said that is has been hurting since last spring. The pt's description may suggest a rotator cuff tear. She said that [facility] and similar topicals have assuaged the pain. I have ordered lidocaine patches. She may decide to see an orthopedist as an outpatient. The pt endorses an occasional non-productive cough; she said that she coughs when she feels she needs to clear her throat. The pt denies N/V and SOB (on 3L NC and on home Trilogy at night). She reports regular BMs and is on daily Colace. Ms Tyra Ogden was adamant that she wants to return home rather than to [facility]. She lives alone and has a CLTC aide 3 hours x 5 days.   The pt said that her dtrs and a granddtr are able to be with her part of the time.  She said that she has been thinking about the fact that, even with their assistance, there would be hours when she would be by herself and may unable to get up to the bathroom or may fall, as she has done a number of times in the past.  Nevertheless, she would like to try being at home. She is agreeable to restarting home health services as well as to visits by a Palliative Care NP. An order has been placed to  for arranging home palliative care. Will continue to follow. Will discuss findings with members of the interdisciplinary team.      Thank you for this referral.     In addition to the E&M described above, more than 50% of a 40-minute prolonged visit, from  1020- 1100, was spent on counseling and coordination of care. .    Subjective:     History obtained from:  Patient, Care Provider and Chart    Chief Complaint: Dilemma of needing round the clock care, but adamant about returning home. History of Present Illness:   Ms Aaron Hernandez is a 67 y/o female with hx CAD, s/p AVR, on warfarin, myelodysplastic syndrome, duodena adenoma with plans for endoscopy this week, ICD, COPD, CHF (EF 20-25%), CKD, pulmonary HTN, REJI-uses trilogy CPAP, and other conditions listed below, who presents to ED after a suspected syncopal episode or fall at [facility]. Found on bed, lethargic and poorly responsive. ABG with respiratory acidosis with a pH of 7.211 and CO2 99.5. PO2 is 70. Admitted for acute HF and hypercarbic respiratory failure. Seen by cardiology, PT/OT, GI,         Advance Directive: Yes       Code Status:  DNR            Health Care Power of : Yes - Copy of [address] on file.     Past Medical History:   Diagnosis Date    Anticoagulated on Coumadin 7/9/2013    S/P AVR     CAD (coronary artery disease) 1/20/2013 5/8/14 PCI LAD with stent placed     Cardiomyopathy ([address]     CHF (congestive heart failure) ([address] 2/17/2017    CKD (chronic kidney disease) stage 3, GFR 30-59 ml/min ([facility]) 7/10/2013    COPD ([facility] obstructive [facility] disease) [facility] 2014    Diabetes ([facility]     Essential hypertension, benign 2013    HLD (hyperlipidemia)     ICD (implantable cardioverter-defibrillator) in place 10/2/2014    Biotronik single-chamber ICD implantation 10/20/14     Iron deficiency anemia due to chronic blood loss 2009    MDS (myelodysplastic syndrome) ([facility] 2011    Procrit started in 11 Procrit weekly and Iron stores. 12 good response to 3 weekly procrit did not need it last time  3- Pt doing well. Just wanted a \"check-up. \" Responding to Procrit every three weeks. 13 patient has missed some injections on recently restarted hemoglobin only issue , takes oral iron iron stores each time       REJI (obstructive sleep apnea)-cpap 2014    Osteoarthritis     Osteopenia     Pulmonary hypertension ([facility] 6/15/2016    Quadrantanopia     Skin defect 2018    Visual complaint 2015      Past Surgical History:   Procedure Laterality Date    CARDIAC CATHETERIZATION      COLONOSCOPY N/A 2019    COLONOSCOPY/ BMI 40 ROOM 637 performed by Truman Ty MD at [facility], 2005    mechanical valve     HX  SECTION      HX CORONARY STENT PLACEMENT  May, 2014    STEMI with Stent placement.     HX ENDOSCOPY  2009    EGD    HX HEART CATHETERIZATION      HX PACEMAKER  10/2/2014    Biotronik ICD    HX TUBAL LIGATION      IR BX BONE MARROW DIAGNOSTIC  2011     Family History   Problem Relation Age of Onset    Heart Disease Mother         CHF    Hypertension Mother     Kidney Disease Mother     Heart Disease Father         CHF    Lung Disease Father     Diabetes Father     Cancer Father         prostate    Hypertension Father     Heart Attack Father     Heart Disease Maternal Aunt     Diabetes Brother     Coronary Artery Disease Other     Breast Cancer Neg Hx       Social History     Tobacco Use    Smoking status: Former Smoker     Packs/day: 0.25     Years: 42.00     Pack years: 10.50     Types: Cigarettes     Start date: 10/1/1956     Last attempt to quit: 2014     Years since quittin.2    Smokeless tobacco: Never Used   Substance Use Topics    Alcohol use: No     Alcohol/week: 0.0 standard drinks     Prior to Admission medications    Medication Sig Start Date End Date Taking? Authorizing Provider   warfarin (COUMADIN) 4 mg tablet Take 4 mg by mouth every evening. Indications: Blood Clot caused by Artificial Heart Valve   Yes Provider, Historical   atorvastatin (LIPITOR) 10 mg tablet Take 10 mg by mouth nightly. Yes Provider, Historical   pantoprazole (PROTONIX) 40 mg tablet Take 40 mg by mouth daily. Yes Provider, Historical   furosemide (LASIX) 40 mg tablet TAKE 1 TABLET BY MOUTH EVERY DAY 19  Yes Felisha Oleary MD   traZODone (DESYREL) 50 mg tablet TAKE 1/2-1 TABS BY MOUTH NIGHTLY. 3/19/19  Yes Felisha Oleary MD   carvedilol (COREG) 6.25 mg tablet Take 1 Tab by mouth two (2) times daily (with meals). 19  Yes Josi Ross MD   sertraline (ZOLOFT) 50 mg tablet Take 1 Tab by mouth daily. 18  Yes Krystina Rose MD   fluticasone-vilanterol (BREO ELLIPTA) 974-37 mcg/dose inhaler Take 1 Puff by inhalation daily. 18  Yes Mely Garza NP   aspirin delayed-release 81 mg tablet Take 81 mg by mouth daily. Yes Provider, Historical   docusate sodium (COLACE) 50 mg capsule Take 50 mg by mouth daily. Yes Provider, Historical   ferrous gluconate 324 mg (38 mg iron) tablet TAKE 1 TAB BY MOUTH DAILY (BEFORE BREAKFAST). INDICATIONS: IRON DEFICIENCY ANEMIA  Patient taking differently: Take 325 mg by mouth two (2) times daily (with meals).  18  Yes Krystina Rose MD   fluticasone (FLONASE) 50 mcg/actuation nasal spray 2 Sprays by Both Nostrils route daily as needed. Patient taking differently: 2 Sprays by Both Nostrils route daily as needed for Allergies. 3/7/18  Yes Vicky Lord MD   albuterol (PROVENTIL VENTOLIN) 2.5 mg /3 mL (0.083 %) nebulizer solution 3 mL by Nebulization route every four (4) hours as needed for Wheezing. 8/30/17  Yes Maria L Thornton NP   albuterol (VENTOLIN HFA) 90 mcg/actuation inhaler 2 puffs qid prn  Patient taking differently: Take 2 Puffs by inhalation every six (6) hours as needed for Wheezing or Shortness of Breath. 8/30/17  Yes Marissa OROURKE NP   OXYGEN-AIR DELIVERY SYSTEMS 3 L by Nasal route continuous. Yes Provider, Historical   acetaminophen (TYLENOL) 325 mg tablet Take 650 mg by mouth every six (6) hours as needed for Pain. Provider, Historical   simvastatin (ZOCOR) 20 mg tablet Take 1 Tab by mouth nightly. Patient not taking: Reported on 7/24/2019 12/12/18   Vicky Lord MD       Allergies   Allergen Reactions    Ferrlecit [Sodium Ferric Gluconat-Sucrose] Other (comments)     Chest pain, reported MI after administration    Sulfa (Sulfonamide Antibiotics) Hives    Aspirin Nausea Only     Cannot take uncoated aspirin        Review of Systems:  A comprehensive review of systems was negative except for: Constitutional: Positive for fatigue. Respiratory: Positive for dyspnea on o2     Objective:     Visit Vitals  /69   Pulse 75   Temp 98.8 °F (37.1 °C)   Resp 18   Ht 5' 2\" (1.575 m)   Wt 225 lb 9.6 oz (102.3 kg)   SpO2 97%   BMI 41.26 kg/m²        Physical Exam:    General:  Cooperative. No acute distress. Eyes:  Conjunctivae/corneas clear    Nose: Nares normal. Septum midline. Neck: Supple, symmetrical, trachea midline, no JVD   Lungs:   Clear to auscultation bilaterally, unlabored   Heart:  Regular rate and rhythm, no murmur    Abdomen:   Soft, non-tender, non-distended   Extremities: Normal, atraumatic, +1  BLE edema   Skin: Skin color, texture, turgor normal. No rash or lesions.    Neurologic: Nonfocal   Psych: Alert and oriented      Assessment:     Hospital Problems  Date Reviewed: 7/2/2019          Codes Class Noted POA    * (Principal) Hypercapnic respiratory failure ([facility] ICD-10-CM: J96.92  ICD-9-CM: 518.81  7/29/2019 Unknown        CHF exacerbation ([facility] ICD-10-CM: I50.9  ICD-9-CM: 428.0  7/28/2019 Yes        Systolic CHF, acute on chronic ([facility] ICD-10-CM: I50.23  ICD-9-CM: 428.23, 428.0  12/31/2018 Yes        Type 2 diabetes mellitus with nephropathy (HCC) (Chronic) ICD-10-CM: E11.21  ICD-9-CM: 250.40, 583.81  12/18/2017 Yes        S/P AVR (aortic valve replacement) (Chronic) ICD-10-CM: Z95.2  ICD-9-CM: V43.3  Unknown Yes    Overview Signed 2/23/2016 11:04 AM by Abby Love     Mechanical/Artific             Cardiomyopathy ([facility] (Chronic) ICD-10-CM: I42.9  ICD-9-CM: 425.4  Unknown Yes        HLD (hyperlipidemia) (Chronic) ICD-10-CM: E78.5  ICD-9-CM: 272.4  Unknown Yes        ICD (implantable cardioverter-defibrillator) in place ICD-10-CM: Z95.810  ICD-9-CM: V45.02  10/2/2014 Yes    Overview Signed 10/2/2014  4:58 PM by Idania Irby single-chamber ICD implantation 10/20/14             COPD (chronic obstructive pulmonary disease) ([facility] (Chronic) ICD-10-CM: J44.9  ICD-9-CM: 674  4/2/2014 Yes    Overview Signed 10/6/2018  8:56 PM by Oma Pierce NP     HOME OXYGEN 3 LITERS             REJI (obstructive sleep apnea)-cpap (Chronic) ICD-10-CM: G47.33  ICD-9-CM: 327.23  4/2/2014 Yes        CKD (chronic kidney disease) stage 4, GFR 15-29 ml/min (HCC) ICD-10-CM: N18.4  ICD-9-CM: 585.4  7/10/2013 Yes        CAD (coronary artery disease) (Chronic) ICD-10-CM: I25.10  ICD-9-CM: 414.00  1/20/2013 Yes    Overview Signed 5/20/2014 10:05 AM by Silvia Brown NP     5/8/14 PCI LAD with stent placed             Essential hypertension, benign (Chronic) ICD-10-CM: I10  ICD-9-CM: 401.1  1/20/2013 Yes        MDS (myelodysplastic syndrome) (HCC) (Chronic) ICD-10-CM: D46.9  ICD-9-CM: 238.75 12/17/2011 Yes    Overview Addendum 8/29/2013 11:06 AM by Jeremías Maharaj started in August, 2011 12/18/11 Procrit weekly and Iron stores. 5-12 12-13-12 good response to 3 weekly procrit did not need it last time    3-7-13 Pt doing well. Just wanted a \"check-up. \" Responding to Procrit every three weeks.   8-29-13 patient has missed some injections on recently restarted hemoglobin only issue , takes oral iron iron stores each time                      Signed By: Colette Borrero, NP     July 30, 2019

## 2019-07-30 NOTE — PROGRESS NOTES
Progress Note    2019  Admit Date: 2019  4:43 PM   NAME: Oscar Morris   :  1950   MRN:  [id number]   Attending: Rachna Thapa MD  PCP:  Thais Marques MD  Treatment Team: Attending Provider: Rachna Thapa MD; Consulting Provider: Nato Billingsley MD; Care Manager: Yanick Howard, RN; Utilization Review: Jenny Nuñez; Consulting Provider: Shira Conley MD; Occupational Therapist: Ok Coker OT; Physical Therapist: Mariel England, PT, DPT    DNR   SUBJECTIVE:   Ms. Andree James is a 69 yo female with PMH of CAD, systolic CHF, ICD, CKD 4, HTN, hyperlipidemia, MDS, COPD, [facility] trilogy[facility], small bowel mass with plans for endoscopy this week who presented from rehab with increased in LE edema, SOB. She was found with a blank stare and minimally responsive PTA. CT head pending. Pt found to have PH 7.211, CO2, 99.5, O2 70, HCO3 39.9. BNP increased from baseline. ICD interrogated, NO shocks identified. Pt was supposed to be directly admitted today for heparin bridge for plans for endoscopy later this week. Cardiology consulted. Pt on 3L O2, NC. She was started on lasix IV BID. Pt does not recall syncopal type episode PTA. Pt started on heparin gtt  in anticipation for endoscopy tomorrow for evaluation of duodenal mass. Long discussion with pt yesterday in regards to goals of care. Pt expressed wishes to be DNR, start Palliative Care in anticipation of Hospice at some point. Also states regardless of results of tomorrow's endoscopy she will not want any treatment (surgical or non surgical). Pt denies CP, n/v/d, abd pain. Of note pt does have limited ROM RUE that is chronic per daughter from presumed CVA vs Ortho injury. She is able to moves her fingers and with significant effort move her arm minimally.      Past Medical History:   Diagnosis Date    Anticoagulated on Coumadin 2013    S/P AVR     CAD (coronary artery disease) 2013 5/8/14 PCI LAD with stent placed     [facility]     CHF (congestive heart failure) ([facility] 2/17/2017    CKD (chronic kidney disease) stage 3, GFR 30-59 ml/min ([facility] 7/10/2013    COPD (chronic obstructive pulmonary disease) ([facility] 4/2/2014    Diabetes ([facility]     Essential hypertension, benign 1/20/2013    HLD (hyperlipidemia)     ICD (implantable cardioverter-defibrillator) in place 10/2/2014    Biotronik single-chamber ICD implantation 10/20/14     Iron deficiency anemia due to chronic blood loss 7/29/2009    MDS (myelodysplastic syndrome) ([facility] 12/17/2011    Procrit started in August, 2011 12/18/11 Procrit weekly and Iron stores. 5-12 12-13-12 good response to 3 weekly procrit did not need it last time  3-7-13 Pt doing well. Just wanted a \"check-up. \" Responding to Procrit every three weeks. 8-29-13 patient has missed some injections on recently restarted hemoglobin only issue , takes oral iron iron stores each time       REJI (obstructive sleep apnea)-cpap 4/2/2014    Osteoarthritis     Osteopenia     Pulmonary hypertension ([facility] 6/15/2016    Quadrantanopia     Skin defect 11/1/2018    Visual complaint 12/14/2015     Recent Results (from the past 24 hour(s))   PTT    Collection Time: 07/29/19  7:44 PM   Result Value Ref Range    aPTT 177.3 (HH) 24.7 - 39.8 SEC   CBC WITH AUTOMATED DIFF    Collection Time: 07/30/19  3:54 AM   Result Value Ref Range    WBC 6.2 4.3 - 11.1 K/uL    RBC 2.82 (L) 4.05 - 5.2 M/uL    HGB 8.0 (L) 11.7 - 15.4 g/dL    HCT 26.6 (L) 35.8 - 46.3 %    MCV 94.3 79.6 - 97.8 FL    MCH 28.4 26.1 - 32.9 PG    MCHC 30.1 (L) 31.4 - 35.0 g/dL    RDW 15.4 (H) 11.9 - 14.6 %    PLATELET 931 (L) 510 - 450 K/uL    MPV 11.9 9.4 - 12.3 FL    ABSOLUTE NRBC 0.00 0.0 - 0.2 K/uL    DF AUTOMATED      NEUTROPHILS 72 43 - 78 %    LYMPHOCYTES 15 13 - 44 %    MONOCYTES 9 4.0 - 12.0 %    EOSINOPHILS 3 0.5 - 7.8 %    BASOPHILS 1 0.0 - 2.0 %    IMMATURE GRANULOCYTES 0 0.0 - 5.0 %    ABS. NEUTROPHILS 4.4 1.7 - 8.2 K/UL    ABS. LYMPHOCYTES 0.9 0.5 - 4.6 K/UL    ABS. MONOCYTES 0.5 0.1 - 1.3 K/UL    ABS. EOSINOPHILS 0.2 0.0 - 0.8 K/UL    ABS. BASOPHILS 0.0 0.0 - 0.2 K/UL    ABS. IMM. GRANS. 0.0 0.0 - 0.5 K/UL   METABOLIC PANEL, COMPREHENSIVE    Collection Time: 07/30/19  3:54 AM   Result Value Ref Range    Sodium 142 136 - 145 mmol/L    Potassium 3.7 3.5 - 5.1 mmol/L    Chloride 97 (L) 98 - 107 mmol/L    CO2 40 (H) 21 - 32 mmol/L    Anion gap 5 (L) 7 - 16 mmol/L    Glucose 112 (H) 65 - 100 mg/dL    BUN 52 (H) 8 - 23 MG/DL    Creatinine 2.15 (H) 0.6 - 1.0 MG/DL    GFR est AA 29 (L) >60 ml/min/1.73m2    GFR est non-AA 24 (L) >60 ml/min/1.73m2    Calcium 8.9 8.3 - 10.4 MG/DL    Bilirubin, total 0.7 0.2 - 1.1 MG/DL    ALT (SGPT) 15 12 - 65 U/L    AST (SGOT) 13 (L) 15 - 37 U/L    Alk.  phosphatase 63 50 - 136 U/L    Protein, total 6.2 (L) 6.3 - 8.2 g/dL    Albumin 2.8 (L) 3.2 - 4.6 g/dL    Globulin 3.4 2.3 - 3.5 g/dL    A-G Ratio 0.8 (L) 1.2 - 3.5     BNP    Collection Time: 07/30/19  3:54 AM   Result Value Ref Range     (H) 0 pg/mL   PROTHROMBIN TIME + INR    Collection Time: 07/30/19  3:54 AM   Result Value Ref Range    Prothrombin time 22.3 (H) 11.7 - 14.5 sec    INR 2.0     PTT    Collection Time: 07/30/19  3:54 AM   Result Value Ref Range    aPTT 168.5 (HH) 24.7 - 39.8 SEC     Allergies   Allergen Reactions    Ferrlecit [Sodium Ferric Gluconat-Sucrose] Other (comments)     Chest pain, reported MI after administration    Sulfa (Sulfonamide Antibiotics) Hives    Aspirin Nausea Only     Cannot take uncoated aspirin     Current Facility-Administered Medications   Medication Dose Route Frequency Provider Last Rate Last Dose    docusate sodium (COLACE) capsule 100 mg  100 mg Oral DAILY Elsa Campo MD        tuberculin injection 5 Units  5 Units IntraDERMal Dafne SAEZ NP   5 Units at 07/29/19 1149    heparin 25,000 units in dextrose 500 mL infusion  12-25 Units/kg/hr IntraVENous TITRATE Tena Prasad NP 12 mL/hr at 07/30/19 0733 6 Units/kg/hr at 07/30/19 0733    traMADol (ULTRAM) tablet 50 mg  50 mg Oral Q6H PRN Cece SAEZ NP   50 mg at 07/29/19 1749    carvedilol (COREG) tablet 6.25 mg  6.25 mg Oral BID WITH MEALS Tylor Roque MD   6.25 mg at 07/30/19 6959    aspirin delayed-release tablet 81 mg  81 mg Oral DAILY Tylor Roque MD   81 mg at 07/30/19 0900    albuterol (PROVENTIL VENTOLIN) nebulizer solution 2.5 mg  2.5 mg Nebulization Q4H PRN Tylor Roque MD        acetaminophen (TYLENOL) tablet 650 mg  650 mg Oral Q6H PRN Tylor Roque MD        fluticasone propionate (FLONASE) 50 mcg/actuation nasal spray 2 Spray  2 Spray Both Nostrils DAILY PRN Tylor Roque MD        ferrous gluconate 324 mg (38 mg iron) tablet 1 Tab  1 Tab Oral BID WITH MEALS Tylor Roque MD   1 Tab at 07/30/19 0847    traZODone (DESYREL) tablet 50 mg  50 mg Oral QHS PRN Tylor Roque MD        sertraline (ZOLOFT) tablet 50 mg  50 mg Oral DAILY Tylor Roque MD   50 mg at 07/30/19 0847    pantoprazole (PROTONIX) tablet 40 mg  40 mg Oral ACB Tylor Roque MD   40 mg at 07/30/19 0601    simvastatin (ZOCOR) tablet 20 mg  20 mg Oral QHS Tylor Roque MD   20 mg at 07/29/19 2101    ondansetron (ZOFRAN) injection 4 mg  4 mg IntraVENous Q6H PRN Tylor Roque MD        furosemide (LASIX) injection 40 mg  40 mg IntraVENous BID yTlor Roque MD   40 mg at 07/30/19 0848    docusate sodium (COLACE) capsule 100 mg  100 mg Oral PRN Tylor Roque MD        budesonide (PULMICORT) 500 mcg/2 ml nebulizer suspension  500 mcg Nebulization BID RT Tylor Roque MD   500 mcg at 07/30/19 1250    And    albuterol (PROVENTIL VENTOLIN) nebulizer solution 2.5 mg  2.5 mg Nebulization Q6HWA RT Tylor Roque MD   2.5 mg at 07/30/19 1023       Review of Systems negative with exception of pertinent positives noted above  PHYSICAL EXAM Visit Vitals  /69   Pulse 75   Temp 98.8 °F (37.1 °C)   Resp 18   Ht 5' 2\" (1.575 m)   Wt 102.3 kg (225 lb 9.6 oz)   SpO2 97%   BMI 41.26 kg/m²      Temp (24hrs), Av.5 °F (36.9 °C), Min:98.1 °F (36.7 °C), Max:98.8 °F (37.1 °C)    Oxygen Therapy  O2 Sat (%): 97 % (19 08)  Pulse via Oximetry: 72 beats per minute (19)  O2 Device: Nasal cannula(placed back on 3L NC) (19)  O2 Flow Rate (L/min): 3 l/min (19)    Intake/Output Summary (Last 24 hours) at 2019 0857  Last data filed at 2019 0300  Gross per 24 hour   Intake 893 ml   Output 1575 ml   Net -682 ml      General:       No acute distress, appears chronically ill   Lungs:          CTA bilaterally. Resp even and nonlabored  Heart:            S1S2 present without murmur rub gallops.  RRR. No LE edema  Abdomen:    Soft, non tender, non distended. BS present  Extremities: Moves ext spontaneously however limited ROM RUE chronic. No cyanosis  Neurologic:  A/O X3.   No focal deficits    Results summary of Diagnostic Studies/Procedures copied from within [facility] EMR:        [address] Problems    Diagnosis Date Noted    Hypercapnic respiratory failure ([facility] 2019    CHF exacerbation (HCC)     Systolic CHF, acute on chronic (HCC) 2018    Type 2 diabetes mellitus with nephropathy ([address] 2017    S/P AVR (aortic valve replacement)      Mechanical/Artific      Cardiomyopathy ([address]     HLD (hyperlipidemia)     ICD (implantable cardioverter-defibrillator) in place 10/02/2014     Biotronik single-chamber ICD implantation 10/20/14      REJI (obstructive sleep apnea)-cpap 2014    COPD (chronic obstructive pulmonary disease) ([facility] 2014     HOME OXYGEN 3 LITERS      CKD (chronic kidney disease) stage 4, GFR 15-29 ml/min ([facility] 07/10/2013    Essential hypertension, benign 2013    CAD (coronary artery disease) 2013 PCI LAD with stent placed      MDS (myelodysplastic syndrome) ([facility] 12/17/2011     Procrit started in August, 2011 12/18/11 Procrit weekly and Iron stores. 5-12 12-13-12 good response to 3 weekly procrit did not need it last time    3-7-13 Pt doing well. Just wanted a \"check-up. \" Responding to Procrit every three weeks. 8-29-13 patient has missed some injections on recently restarted hemoglobin only issue , takes oral iron iron stores each time          Plan:    Acute on chronic hypoxic resp failure with hypercapnia  On baseline O2 at 3 L  ?  Home compliance with trilogy  CXR with suspected worsening mild interstitial edema  Continue lasix  Watch renal function, slightly up today  Strict I/O  Cardiology following     Duodenal adenoma  GI following  Plans for endoscopy tomorrow  On heparin gtt     MDS  Follow counts closely     HTN  Controlled  Continue current regimen  No ACE/ARB due to CKD     CKD 4  Holding ACE/ARB   Creatinine trending up, follow closely  Has hx of worsening renal function with diuresis  CMP in AM     REJI  Has home trilogy     S/p AVR  On heparin bridge        DM II  A1C 5.8 in April 2019     Acute on chronic systolic CHF  echo Nov 4620 with EF 20-25%, grade 2 DD  Strict I/Os  Continue lasix IV  Monitor renal function closely, trending up today  Cardiology following  No ACE/ARB given CKD  Continue coreg  Echo shows EF 20-25%, grade 2 DD, markedly dilated atrium     Notes, labs, VS, diagnostic testing reviewed  Time spent with pt 20 min        DVT Prophylaxis: heparin gtt   Plan of Care Discussed with: Supervising MD Dr. Swapnil López, care team, pt, daughter       Kay Mcneal, DOROTA

## 2019-07-30 NOTE — PROGRESS NOTES
[facility] CARDIOLOGY PROGRESS NOTE           7/30/2019 8:15 AM    Admit Date: 7/28/2019      Subjective:   Patient being followed for syncope and acute sHF exacerbation. On IVP lasix Patient is on home trilogy. Awakens easily when spoken to. Denies pain. Complaining of severe weakness. Edema noted in BLE. Coumadin is being held for endoscoposcy later this week. On heparin gtt. Slight drop in Hgb/Hct today. Cr elevated to 2.15 from 2.08.    ROS:  Cardiovascular:  As noted above, NSR on monitor.      Objective:      Vitals:    07/29/19 2300 07/30/19 0300 07/30/19 0711 07/30/19 0809   BP: 107/69 121/69  125/69   Pulse: 65 79  75   Resp: 18 18  18   Temp: 98.7 °F (37.1 °C) 98.2 °F (36.8 °C)  98.8 °F (37.1 °C)   SpO2: 90% 96% 95% 97%   Weight:  225 lb 9.6 oz (102.3 kg)     Height:           Physical Exam:  General-No Acute Distress, A&O x3  Neck- supple, no JVD  CV- regular rate and rhythm no MRG  Lung- rhonchi bilaterally  Abd- soft, nontender, nondistended, obese  Ext- +1 pitting edema BLE  Skin- warm and dry    Data Review:   Recent Labs     07/30/19  0354 07/29/19  0435 07/28/19  2251    144  --    K 3.7 4.4  --    MG  --   --  1.8   BUN 52* 51*  --    CREA 2.15* 2.08*  --    * 87  --    WBC 6.2 5.5  --    HGB 8.0* 8.2*  --    HCT 26.6* 28.3*  --    * 117*  --    INR 2.0 2.2  --    TROIQ  --  0.06* 0.08*       Assessment/Plan:     Principal Problem:    Hypercapnic respiratory failure (HCC) (7/29/2019)  On home trilogy at night, 3L NC during day     Active Problems:    MDS (myelodysplastic syndrome) (HCC) (12/17/2011)      CAD (coronary artery disease) (1/20/2013)      Essential hypertension, benign (1/20/2013)      CKD (chronic kidney disease) stage 4, GFR 15-29 ml/min ([facility]) (7/10/2013)      COPD (chronic obstructive pulmonary disease) ([facility]) (4/2/2014)      REJI (obstructive sleep apnea)-cpap (4/2/2014)      ICD (implantable cardioverter-defibrillator) in place (10/2/2014)      HLD (hyperlipidemia) ()      S/P AVR (aortic valve replacement) ()      Cardiomyopathy (HCC) ()      Type 2 diabetes mellitus with nephropathy ([facility] (10/66/0914)      Systolic CHF, acute on chronic ([facility] (12/31/2018)      CHF exacerbation ([facility] (7/28/2019)    Off coumadin and on heparin window for future GI procedures. IVP lasix. BNP improving however Cr worsening.          Katelyn Chao RN  7/30/2019 8:15 AM

## 2019-07-30 NOTE — PROGRESS NOTES
END OF SHIFT NOTE:    INTAKE/OUTPUT  07/29 0701 - 07/30 0700  In: 373 [P.O.:590; I.V.:303]  Out: 1575 [Urine:1575]  Voiding: NO  Catheter: YES  Drain:              Flatus: Patient does have flatus present. Stool:  0 occurrences. Characteristics:       Emesis: 0 occurrences. Characteristics:        VITAL SIGNS  Patient Vitals for the past 12 hrs:   Temp Pulse Resp BP SpO2   07/30/19 0300 98.2 °F (36.8 °C) 79 18 121/69 96 %   07/29/19 2300 98.7 °F (37.1 °C) 65 18 107/69 90 %   07/29/19 2032     97 %   07/29/19 2015     98 %   07/29/19 1900 98.7 °F (37.1 °C) 74 17 121/67 97 %       Pain Assessment  Pain Intensity 1: 0 (07/29/19 0827)        Patient Stated Pain Goal: 0    Ambulating  No  Trilogy on. awakens easily. Awaiting PTT results from 3am. No c/o's pain. Shift report given to oncoming nurse at the bedside.     Roberto Carlos Donahue RN

## 2019-07-31 PROBLEM — D13.2 ADENOMATOUS DUODENAL POLYP: Status: ACTIVE | Noted: 2019-01-01

## 2019-07-31 NOTE — PROGRESS NOTES
Warfarin dosing per pharmacist 
 
Karmen Massey is a 68 y.o. female. Height: 5' 2\" (157.5 cm)    Weight: 102.7 kg (226 lb 8 oz) Indication:  Mechanical AVR Goal INR:  2-3 Home dose:  4 mg qhs 
 
Risk factors or significant drug interactions:  none Other anticoagulants:  -- 
 
Daily Monitoring Date  INR     Warfarin dose HGB              Notes 7/28  2.2  Held  8.7 
7/29  2.2  Held  8.2 7/30  2  Held  8 
7/31  1.6  4 mg  8.1 Pharmacy is consulted to resume warfarin for Ms. Corral. Her INR was therapeutic on admission, warfarin has been held for EGD. Resume warfarin at PTA dose of 4 mg qhs. Daily INR. Pharmacy will continue to follow. Please call with any questions. Thank you, Christine Walsh, PharmD Clinical Pharmacist 
[phone]

## 2019-07-31 NOTE — PROGRESS NOTES
[facility]. met with the patient to explain the [address] and [facility]. [facility] agreed to [facility] following discharge from IP or STR. HC will follow via chart review and contact once at home.

## 2019-07-31 NOTE — PROGRESS NOTES
[facility] CARDIOLOGY PROGRESS NOTE 
      
 
7/31/2019 8:15 AM 
 
Admit Date: 7/28/2019 Subjective:  
Patient being followed for syncope and acute sHF exacerbation. On IVP lasix Patient is on home trilogy. Awakens easily when spoken to. Denies pain. Complaining of severe weakness. Edema noted in BLE. Coumadin is being held for endoscoposcy later this week. On heparin gtt. Slight drop in Hgb/Hct today. Cr elevated to 2.15 from 2.08. 
 
ROS: 
Cardiovascular:  As noted above, NSR on monitor. Objective:  
  
Vitals:  
 07/30/19 2122 07/30/19 2300 07/31/19 0039 07/31/19 0300 BP:  121/75  142/80 Pulse:  66  71 Resp:  17  17 Temp:  97.7 °F (36.5 °C)  98.1 °F (36.7 °C) SpO2: 97% 90% 92% 91% Weight:    102.7 kg (226 lb 8 oz) Height:      
 
 
Physical Exam: 
General-No Acute Distress, A&O x3 Neck- supple, no JVD 
CV- regular rate and rhythm no MRG Lung- rhonchi bilaterally Abd- soft, nontender, nondistended, obese Ext- +1 pitting edema BLE Skin- warm and dry Data Review:  
Recent Labs  
  07/31/19 
0336 07/30/19 
0354 07/29/19 
0435  07/28/19 
2251  142 144  --   --   
K 4.2 3.7 4.4  --   --   
MG  --   --   --   --  1.8 BUN 46* 52* 51*  --   --   
CREA 1.73* 2.15* 2.08*  --   --   
GLU 90 112* 87  --   --   
WBC 5.4 6.2 5.5  --   --   
HGB 8.1* 8.0* 8.2*  --   --   
HCT 27.1* 26.6* 28.3*  --   --   
* 125* 117*  --   --   
INR 1.6 2.0 2.2   < >  --   
TROIQ  --   --  0.06*  --  0.08*  
 < > = values in this interval not displayed. Assessment/Plan:  
 
Principal Problem: Hypercapnic respiratory failure ([address] (7/29/2019) On home trilogy at night, 3L NC during day Active Problems: 
  MDS (myelodysplastic syndrome) ([facility] (12/17/2011) CAD (coronary artery disease) (1/20/2013) Essential hypertension, benign (1/20/2013) CKD (chronic kidney disease) stage 4, GFR 15-29 ml/min ([facility]) (7/10/2013) COPD (chronic obstructive pulmonary disease) ([facility] (4/2/2014) REJI (obstructive sleep apnea)-cpap (4/2/2014) ICD (implantable cardioverter-defibrillator) in place (10/2/2014) HLD (hyperlipidemia) () 
 
  S/P AVR (aortic valve replacement) () Cardiomyopathy ([facility] () Type 2 diabetes mellitus with nephropathy ([facility] (12/18/2017) Systolic CHF, acute on chronic ([facility] (12/31/2018) CHF exacerbation ([facility] (7/28/2019) Off coumadin and on heparin window for future GI procedures. IVP lasix. BNP improving however Cr worsening.   
 
 
 
Dinorah Nelson MD 
7/31/2019 8:15 AM

## 2019-07-31 NOTE — PROGRESS NOTES
Progress Note 2019 Admit Date: 2019  4:43 PM  
NAME: Zane Ayala :  1950 MRN:  [id number] Attending: Brenda Celestin MD 
PCP:  Halle Campos MD 
Treatment Team: Attending Provider: Brenda Celestin MD; Consulting Provider: Harley Chaudhry MD; Care Manager: Yessica Fong RN; Utilization Review: Gisselle Garza; Consulting Provider: Karlos Isaac MD; Consulting Provider: Shahida Miller NP; Primary Nurse: William Alexander 
 
DNR SUBJECTIVE:  
Ms. Kidd Client is a 69 yo female with PMH of CAD, systolic CHF, ICD, CKD 4, HTN, hyperlipidemia, MDS, COPD, REJI on trilogy[facility], small bowel mass with plans for endoscopy this week who presented from rehab with increased in LE edema, SOB. She was found with a blank stare and minimally responsive PTA. CT head pending. Pt found to have PH 7.211, CO2, 99.5, O2 70, HCO3 39.9. BNP increased from baseline. ICD interrogated, NO shocks identified. Pt was supposed to be directly admitted today for heparin bridge for plans for endoscopy later this week. Cardiology consulted. Pt on 3L O2, NC. She was started on lasix IV BID. Pt does not recall syncopal type episode PTA. Pt started on heparin gtt  in anticipation for endoscopy tomorrow for evaluation of duodenal mass. Long discussion with pt yesterday in regards to goals of care. Pt expressed wishes to be DNR, start Palliative Care in anticipation of Hospice at some point. Also states regardless of results of tomorrow's endoscopy she will not want any treatment (surgical or non surgical). Pt denies CP, n/v/d, abd pain. Of note pt does have limited ROM RUE that is chronic per daughter from presumed CVA vs Ortho injury. She is able to moves her fingers and with significant effort move her arm minimally. : Patient alert and oriented x 3.  On normal home oxygen amount of 3 liters with saturation noted 91%. Venous CO2 43 this am, patient states breathing much improved. Afebrile, hemoglobin baseline of 8.1. Creatinine improved this am 1.73 from 2.15 yesterday. I spoke with CM and will plan for Friday discharge to patient home per patient and family request. Palliative care following and plans to follow outpatient. Past Medical History:  
Diagnosis Date  Anticoagulated on Coumadin 7/9/2013 S/P AVR  CAD (coronary artery disease) 1/20/2013 5/8/14 PCI LAD with stent placed  Cardiomyopathy ([address]  CHF (congestive heart failure) ([address] 2/17/2017  CKD (chronic kidney disease) stage 3, GFR 30-59 ml/min ([facility]) 7/10/2013  COPD (chronic obstructive pulmonary disease) ([address] 4/2/2014  Diabetes ([address]  Essential hypertension, benign 1/20/2013  HLD (hyperlipidemia)  ICD (implantable cardioverter-defibrillator) in place 10/2/2014 Biotronik single-chamber ICD implantation 10/20/14  Iron deficiency anemia due to chronic blood loss 7/29/2009  MDS (myelodysplastic syndrome) ([address] 12/17/2011 Procrit started in August, 2011 12/18/11 Procrit weekly and Iron stores. 5-12 12-13-12 good response to 3 weekly procrit did not need it last time  3-7-13 Pt doing well. Just wanted a \"check-up. \" Responding to Procrit every three weeks. 8-29-13 patient has missed some injections on recently restarted hemoglobin only issue , takes oral iron iron stores each time  REJI (obstructive sleep apnea)-cpap 4/2/2014  Osteoarthritis  Osteopenia  Pulmonary hypertension ([address] 6/15/2016  Quadrantanopia  Skin defect 11/1/2018  Visual complaint 12/14/2015 Recent Results (from the past 24 hour(s)) PTT Collection Time: 07/30/19 11:32 AM  
Result Value Ref Range aPTT 60.0 (H) 24.7 - 39.8 SEC  
PTT Collection Time: 07/30/19  7:08 PM  
Result Value Ref Range  aPTT 89.1 (H) 24.7 - 39.8 SEC  
CBC WITH AUTOMATED DIFF  
 Collection Time: 07/31/19  3:36 AM  
Result Value Ref Range WBC 5.4 4.3 - 11.1 K/uL  
 RBC 2.90 (L) 4.05 - 5.2 M/uL HGB 8.1 (L) 11.7 - 15.4 g/dL HCT 27.1 (L) 35.8 - 46.3 % MCV 93.4 79.6 - 97.8 FL  
 MCH 27.9 26.1 - 32.9 PG  
 MCHC 29.9 (L) 31.4 - 35.0 g/dL  
 RDW 15.3 (H) 11.9 - 14.6 % PLATELET 401 (L) 534 - 450 K/uL MPV 11.6 9.4 - 12.3 FL ABSOLUTE NRBC 0.00 0.0 - 0.2 K/uL  
 DF AUTOMATED NEUTROPHILS 66 43 - 78 % LYMPHOCYTES 21 13 - 44 % MONOCYTES 10 4.0 - 12.0 % EOSINOPHILS 3 0.5 - 7.8 % BASOPHILS 1 0.0 - 2.0 % IMMATURE GRANULOCYTES 0 0.0 - 5.0 %  
 ABS. NEUTROPHILS 3.5 1.7 - 8.2 K/UL  
 ABS. LYMPHOCYTES 1.1 0.5 - 4.6 K/UL  
 ABS. MONOCYTES 0.5 0.1 - 1.3 K/UL  
 ABS. EOSINOPHILS 0.1 0.0 - 0.8 K/UL  
 ABS. BASOPHILS 0.0 0.0 - 0.2 K/UL  
 ABS. IMM. GRANS. 0.0 0.0 - 0.5 K/UL METABOLIC PANEL, BASIC Collection Time: 07/31/19  3:36 AM  
Result Value Ref Range Sodium 145 136 - 145 mmol/L Potassium 4.2 3.5 - 5.1 mmol/L Chloride 98 98 - 107 mmol/L  
 CO2 43 (HH) 21 - 32 mmol/L Anion gap 4 (L) 7 - 16 mmol/L Glucose 90 65 - 100 mg/dL BUN 46 (H) 8 - 23 MG/DL Creatinine 1.73 (H) 0.6 - 1.0 MG/DL  
 GFR est AA 37 (L) >60 ml/min/1.73m2 GFR est non-AA 31 (L) >60 ml/min/1.73m2 Calcium 9.3 8.3 - 10.4 MG/DL  
PTT Collection Time: 07/31/19  3:36 AM  
Result Value Ref Range aPTT 103.7 (H) 24.7 - 39.8 SEC PROTHROMBIN TIME + INR Collection Time: 07/31/19  3:36 AM  
Result Value Ref Range Prothrombin time 18.9 (H) 11.7 - 14.5 sec INR 1.6 Allergies Allergen Reactions  Ferrlecit [Sodium Ferric Gluconat-Sucrose] Other (comments) Chest pain, reported MI after administration  Sulfa (Sulfonamide Antibiotics) Hives  Aspirin Nausea Only Cannot take uncoated aspirin Current Facility-Administered Medications Medication Dose Route Frequency Provider Last Rate Last Dose  docusate sodium (COLACE) capsule 100 mg  100 mg Oral DAILY Jasmina Vigil, MD   100 mg at 07/30/19 1103  lidocaine 4 % patch 2 Patch  2 Patch TransDERmal Q24H Darrius Vitalrmariama NP   2 Patch at 07/30/19 1151  traMADol (ULTRAM) tablet 50 mg  50 mg Oral Q6H PRN Juan SAEZ, NP   50 mg at 07/30/19 1425  carvedilol (COREG) tablet 6.25 mg  6.25 mg Oral BID WITH MEALS Jasminaami Vigil MD   6.25 mg at 07/30/19 2011  aspirin delayed-release tablet 81 mg  81 mg Oral DAILY Jasmina Vigil MD   81 mg at 07/30/19 0900  
 albuterol (PROVENTIL VENTOLIN) nebulizer solution 2.5 mg  2.5 mg Nebulization Q4H PRN Jasmina Yaritza, MD      
 acetaminophen (TYLENOL) tablet 650 mg  650 mg Oral Q6H PRN Jasmina Cutting, MD      
 fluticasone propionate [facility]) 50 mcg/actuation nasal spray 2 Spray  2 Spray Both Nostrils DAILY PRN Jasminaami Vigil, MD      
 ferrous gluconate 324 mg (38 mg iron) tablet 1 Tab  1 Tab Oral BID WITH MEALS Jasmina Vigil MD   1 Tab at 07/30/19 3034  traZODone (DESYREL) tablet 50 mg  50 mg Oral QHS PRN Jasminaami Vigil MD      
 sertraline (ZOLOFT) tablet 50 mg  50 mg Oral DAILY Jasmina Vigil MD   50 mg at 07/30/19 0847  
 pantoprazole (PROTONIX) tablet 40 mg  40 mg Oral ACB Jasminaami Vigil MD   40 mg at 07/31/19 5632  simvastatin (ZOCOR) tablet 20 mg  20 mg Oral QHS Jasmina Vigil MD   20 mg at 07/30/19 2110  
 ondansetron [facility]) injection 4 mg  4 mg IntraVENous Q6H PRN Jasminaami Vigil MD      
 furosemide (LASIX) injection 40 mg  40 mg IntraVENous BID Jasmina Vigil MD   40 mg at 07/30/19 1809  
 docusate sodium (COLACE) capsule 100 mg  100 mg Oral PRN Jasminaami Vigil MD      
 budesonide (PULMICORT) 500 mcg/2 ml nebulizer suspension  500 mcg Nebulization BID RT Jasmina Cutting, MD   500 mcg at 07/31/19 1978  And  
 albuterol (PROVENTIL VENTOLIN) nebulizer solution 2.5 mg  2.5 mg Nebulization Q6HWA RT Magan Garcia MD   2.5 mg at 19 6100 Review of Systems negative with exception of pertinent positives noted above PHYSICAL EXAM  
 
Visit Vitals /80 (BP 1 Location: Left arm, BP Patient Position: At rest) Pulse 71 Temp 98.1 °F (36.7 °C) Resp 17 Ht 5' 2\" (1.575 m) Wt 102.7 kg (226 lb 8 oz) SpO2 91% BMI 41.43 kg/m² Temp (24hrs), Av.1 °F (36.7 °C), Min:97.7 °F (36.5 °C), Max:98.8 °F (37.1 °C) Oxygen Therapy O2 Sat (%): 91 % (19) Pulse via Oximetry: 76 beats per minute (19) O2 Device: Nasal cannula (19) O2 Flow Rate (L/min): 3 l/min (19) Intake/Output Summary (Last 24 hours) at 2019 7774 Last data filed at 2019 0300 Gross per 24 hour Intake 205.7 ml Output 3200 ml Net -2994.3 ml  
  
General:       No acute distress, appears chronically ill  
Lungs:          CTA bilaterally. Resp even and nonlabored Heart:            S1S2 present without murmur rub gallops.  RRR. No LE edema Abdomen:    Soft, non tender, non distended. BS present Extremities: Moves ext spontaneously however limited ROM RUE chronic. No cyanosis Neurologic:  A/O X3. No focal deficits Results summary of Diagnostic Studies/Procedures copied from within [facility] EMR: 
 
 
 
ASSESSMENT Active Hospital Problems Diagnosis Date Noted  Hypercapnic respiratory failure ([address] 2019  CHF exacerbation ([facility] 2019  Systolic CHF, acute on chronic ([address] 2018  Type 2 diabetes mellitus with nephropathy ([address] 2017  S/P AVR (aortic valve replacement) Mechanical/Artific  Cardiomyopathy ([address]  HLD (hyperlipidemia)  ICD (implantable cardioverter-defibrillator) in place 10/02/2014 Biotron[employer] single-chamber ICD implantation 10/20/14  REJI (obstructive sleep apnea)-cpap 2014  COPD (chronic obstructive pulmonary disease) ([facility] 2014 HOME OXYGEN 3 LITERS 
  
 CKD (chronic kidney disease) stage 4, GFR 15-29 ml/min ([facility]) 07/10/2013  Essential hypertension, benign 01/20/2013  CAD (coronary artery disease) 01/20/2013 5/8/14 PCI LAD with stent placed  MDS (myelodysplastic syndrome) ([facility] 12/17/2011 Procrit started in August, 2011 12/18/11 Procrit weekly and Iron stores. 5-12 12-13-12 good response to 3 weekly procrit did not need it last time 3-7-13 Pt doing well. Just wanted a \"check-up. \" Responding to Procrit every three weeks. 8-29-13 patient has missed some injections on recently restarted hemoglobin only issue , takes oral iron iron stores each time Plan: 
 
Acute on chronic hypoxic resp failure with hypercapnia On baseline O2 at 3 L 
? Home compliance with trilogy CXR with suspected worsening mild interstitial edema Continue lasix Watch renal function, down today Strict I/O Cardiology following Repeat ABG in am. 
  
Duodenal adenoma GI following Plans for endoscopy today On heparin gtt 
  
MDS Follow counts closely 
  
HTN Controlled Continue current regimen No ACE/ARB due to CKD 
  
CKD 4 Holding ACE/ARB Creatinine trending down, follow closely Has hx of worsening renal function with diuresis CMP in AM 
  
REJI Has home trilogy 
  
S/p AVR On heparin bridge  
 
  
DM II 
A1C 5.8 in April 2019 
  
Acute on chronic systolic CHF Strict I/Os Continue lasix IV Monitor renal function closely, trending down today Cardiology following No ACE/ARB given CKD Continue coreg Echo shows EF 20-25%, grade 2 DD, markedly dilated atrium BNP trending down, will repeat in am. 
 
**Family meeting planned for this Saturday, daughter-Ambika-getting  and returning Friday. Per CM, family wants her home although they acknowledge this will most likely not work and patient will return.  
  
Notes, labs, VS, diagnostic testing reviewed Time spent with pt 20 min 
  
  
 DVT Prophylaxis: heparin gtt Plan of Care Discussed with: Supervising MD Dr. Scott Mancuso, care team, pt 
  
 
Bijal Wilcox, DROOTA

## 2019-07-31 NOTE — PROGRESS NOTES
Patient signed consent for EGD and ERCP today. Patient was unable to sign full name but could sign her initials. Dr. Thelma Zuleta notified and stated that was acceptable for the procedure. Patient was able to sign her initials, C.C. On the consent. Consent is in the Patient's chart.

## 2019-07-31 NOTE — PROGRESS NOTES
Palliative Care Progress Note Patient: Nicky Rankin MRN: [id number]  SSN: [id number] YOB: 1950  Age: 68 y.o. Sex: female Assessment/Plan: Chief Complaint/Interval History: Debility Principal Diagnosis: · Debility, Unspecified  R53.81 Additional Diagnoses: · Fatigue, Lethargy  R53.83 · Respiratory Failure, Acute on Chronic  J96.20 · Counseling, Encounter for Medical Advice  Z71.9 
· Encounter for Palliative Care  Z51.5 Palliative Performance Scale (PPS) PPS: 40 Medical Decision Making:  
Reviewed and summarized notes over previous 24 hours. Discussed case with appropriate providers. Reviewed laboratory and x-ray data. Patient resting in bed, asleep, but awakens easily. Patient feeling fair this morning. She is having EGD later today and \"hoping it's not what I think it is\". Validated her concern. Lengthy talk with patient about her quality of life in light of her multiple co morbidities. She says that when everything is \"tip top\", she has no shortness of breath, and says this is the majority of the time. When I asked what she enjoys doing, she says she likes to travel and go to gardens. She specifically names a garden in [place]. Upon further asking, it has been quite some time since she has been able to travel as \"the tanks have held her back\". She has been on oxygen for 6 years. She enjoys her friendships and relationships, and talks on the phone. She is satisfied with her quality of life, even though she cannot travel like she would like to. She has had CLTC aid for several months, and has enjoyed this very much. She likes her aid, and finds her help very beneficial.  She says that her daughters are all pitching in now. She feels like things are going well at home and confirms that she would like to return home. She has CLTC aid 6 days/week.   I do not believe patient is philosophically ready for hospice, but CLTC aid would preclude this and she would not forfeit CLTC aid at this time. Agree that addition of home based palliative care is appropriate. Will continue to follow as needed. Will discuss findings with members of the interdisciplinary team.   
 
  
More than 50% of this 35 minute visit was spent counseling and coordination of care as outlined above. Subjective:  
 
Review of Systems: A comprehensive review of systems was negative except for:  
Constitutional: Positive for fatigue. Objective:  
 
Visit Vitals /71 Pulse 75 Temp 97.9 °F (36.6 °C) Resp 16 Ht 5' 2\" (1.575 m) Wt 226 lb 8 oz (102.7 kg) SpO2 100% BMI 41.43 kg/m² Physical Exam: 
 
General:  Cooperative. No acute distress. Eyes:  Conjunctivae/corneas clear. Nose: Nares normal. Septum midline. O2 via NC. Neck: Supple, symmetrical, trachea midline. Lungs:   Clear to auscultation bilaterally, unlabored. Heart:  Regular rate and rhythm. Abdomen:   Soft, non-tender, non-distended. Extremities: Normal, atraumatic, no cyanosis or edema. Skin: Skin color, texture, turgor normal. No rash. Neurologic: Nonfocal.  
Psych: Alert and oriented. Signed By: Sophia Crawford NP   
 July 31, 2019

## 2019-07-31 NOTE — PROGRESS NOTES
2019 Admit Date: 2019 Subjective: CHIEF COMPLAINT- sob HPI Barbi Dannielle Diet-npo Appetite-good Lexi Brinks Vomiting-no Pain-no BM-yes Bleeding-no Medications-reviewed and adjusted as appropriate IV FLUIDS-reviewed FAM HX-per H&P SH-per H&P Past Medical History:  
Diagnosis Date  Anticoagulated on Coumadin 2013 S/P AVR  CAD (coronary artery disease) 2013 PCI LAD with stent placed  Cardiomyopathy ([address]  CHF (congestive heart failure) ([address] 2017  CKD (chronic kidney disease) stage 3, GFR 30-59 ml/min ([facility]) 7/10/2013  COPD (chronic obstructive pulmonary disease) ([address] 2014  Diabetes ([address]  Essential hypertension, benign 2013  HLD (hyperlipidemia)  ICD (implantable cardioverter-defibrillator) in place 10/2/2014 Biotronik single-chamber ICD implantation 10/20/14  Iron deficiency anemia due to chronic blood loss 2009  MDS (myelodysplastic syndrome) ([address] 2011 Procrit started in 11 Procrit weekly and Iron stores. 12 good response to 3 weekly procrit did not need it last time  3--13 Pt doing well. Just wanted a \"check-up. \" Responding to Procrit every three weeks. 13 patient has missed some injections on recently restarted hemoglobin only issue , takes oral iron iron stores each time  REJI (obstructive sleep apnea)-cpap 2014  Osteoarthritis  Osteopenia  Pulmonary hypertension ([address] 6/15/2016  Quadrantanopia  Skin defect 2018  Visual complaint 2015 Past Surgical History:  
Procedure Laterality Date [address]    COLONOSCOPY N/A 2019 COLONOSCOPY/ BMI 40 ROOM 637 performed by Ambrosio Lindo MD at [address] [facility], [facility]  
 mechanical valve 2005  HX  SECTION    
  HX CORONARY STENT PLACEMENT  May, 2014 STEMI with Stent placement.  HX ENDOSCOPY  7/2009 EGD [address] CATHETERIZATION  2013  HX PACEMAKER  10/2/2014 Biotronik ICD  HX TUBAL LIGATION    
 IR BX BONE MARROW DIAGNOSTIC  7/2011 ROS-- 
               RESP-sob CARDIAC-poor -neg Further ROS as per PMH and PSH- see problem list     
                   
 
Objective:  
 
Visit Vitals /80 Pulse 75 Temp 98.1 °F (36.7 °C) Resp 18 Ht 5' 2\" (1.575 m) Wt 102.7 kg (226 lb 8 oz) SpO2 96% BMI 41.43 kg/m² Intake/Output Summary (Last 24 hours) at 7/31/2019 1614 Last data filed at 7/31/2019 1303 Gross per 24 hour Intake 114 ml Output 2300 ml Net -2186 ml EXAM:   
 NEURO-a&o HEENT-wnl LUNGS-clear Waymond Bodo ABD-soft , min tenderness, no rebound, good bs EXT-no edema LABS- 
Lab Results Component Value Date/Time WBC 5.4 07/31/2019 03:36 AM  
 RBC 2.90 (L) 07/31/2019 03:36 AM  
 HGB 8.1 (L) 07/31/2019 03:36 AM  
 HCT 27.1 (L) 07/31/2019 03:36 AM  
 PLATELET 738 (L) 01/40/2973 03:36 AM  
 
Lab Results Component Value Date/Time  Sodium 145 07/31/2019 03:36 AM  
 Potassium 4.2 07/31/2019 03:36 AM  
 Chloride 98 07/31/2019 03:36 AM  
 CO2 43 (HH) 07/31/2019 03:36 AM  
 Anion gap 4 (L) 07/31/2019 03:36 AM  
 Glucose 90 07/31/2019 03:36 AM  
 BUN 46 (H) 07/31/2019 03:36 AM  
 Creatinine 1.73 (H) 07/31/2019 03:36 AM  
 GFR est AA 37 (L) 07/31/2019 03:36 AM  
 GFR est non-AA 31 (L) 07/31/2019 03:36 AM  
 Calcium 9.3 07/31/2019 03:36 AM  
 Magnesium 1.8 07/28/2019 10:51 PM  
 Phosphorus 5.7 (H) 07/28/2019 10:51 PM  
 Albumin 2.8 (L) 07/30/2019 03:54 AM  
 Bilirubin, total 0.7 07/30/2019 03:54 AM  
 Protein, total 6.2 (L) 07/30/2019 03:54 AM  
 Globulin 3.4 07/30/2019 03:54 AM  
 A-G Ratio 0.8 (L) 07/30/2019 03:54 AM  
 AST (SGOT) 13 (L) 07/30/2019 03:54 AM  
 ALT (SGPT) 15 07/30/2019 03:54 AM  
 
 
     
Assessment:  
 
Principal Problem: Hypercapnic respiratory failure ([address] (7/29/2019) Active Problems: 
  MDS (myelodysplastic syndrome) ([address] (12/17/2011) Overview: Procrit started in August, 2011 12/18/11 Procrit weekly and Iron stores. 5-12 12-13-12 good response to 3 weekly procrit did not need it last time 3-7-13 Pt doing well. Just wanted a \"check-up. \" Responding to Procrit  
    every three weeks. 8-29-13 patient has missed some injections on recently restarted  
    hemoglobin only issue , takes oral iron iron stores each time CAD (coronary artery disease) (1/20/2013) Overview: 5/8/14 PCI LAD with stent placed Essential hypertension, benign (1/20/2013) CKD (chronic kidney disease) stage 4, GFR 15-29 ml/min ([facility]) (7/10/2013) COPD (chronic obstructive pulmonary disease) ([address] (4/2/2014) Overview: HOME OXYGEN 3 LITERS 
 
  REJI (obstructive sleep apnea)-cpap (4/2/2014) ICD (implantable cardioverter-defibrillator) in place (10/2/2014) Overview: Biotronik single-chamber ICD implantation 10/20/14 HLD (hyperlipidemia) () 
 
  S/P AVR (aortic valve replacement) () Overview: Mechanical/Artific Cardiomyopathy ([address] () Type 2 diabetes mellitus with nephropathy ([address] (12/18/2017) Systolic CHF, acute on chronic ([address] (12/31/2018) CHF exacerbation ([address] (7/28/2019) Adenomatous duodenal polyp (7/31/2019) 
 
after overall review the risk of anesthesia and ercp/ ampullectomy is overshadowed by her cardiorespiratory status Her life expectancy is less than the risk of adenoma growth and malignancy or options beyond that if we encountered a malignancy After discussion with pt and daughter we elected to cancel and they agree Plan:  
 
Restart coumadin Resume diet PT SEEN AND EXAMINED AND PLAN DISCUSSED AND IMPLEMENTED. Cheril Bloch, MD

## 2019-07-31 NOTE — ROUTINE PROCESS
CHF teaching /introduction held; sleeping soundly. Planner @ BS, well known to program, will follow. Return home with HH/ CLTA/ PC. Appt: Dr Guillermo Shea for ID friday

## 2019-08-01 NOTE — PROGRESS NOTES
PCT informed writer of patient O2 sat: 82. Writer removed the CPAP machine and placed 3L NC on patient and raised HOB. Patient O2 sat: 94. Contacted RT to replace O2 to the CPAP machine. RT notified writer that O2 was on the machine properly. Patient states \"I breath funny with the mask on, it's harder to breath with it on. \" Patient also states \" it is better when I wear the nasal air. \" Mask removed and patient placed on 3L NC. Patient denies SOB and chest pains.

## 2019-08-01 NOTE — PROGRESS NOTES
07/31/19 2202 Oxygen Therapy O2 Sat (%) 95 % Pulse via Oximetry 74 beats per minute O2 Device BIPAP  
O2 Flow Rate (L/min) 3 l/min CPAP/BIPAP  
CPAP/BIPAP Start/Stop On Device Mode AVAP;S/T Mask Type and Size Full face PIP Observed 28.3 cm H20 Vt Spont (ml) 397 ml Ve Observed (l/min) 6.2 l/min Total RR (Spontaneous) 18 breaths per minute Pt's Home Machine Yes (type/vendor) (Trilogy)

## 2019-08-01 NOTE — PROGRESS NOTES
END OF SHIFT NOTE: 
 
INTAKE/OUTPUT 
07/30 0701 - 07/31 0700 In: 205.7 [I.V.:205.7] Out: 3200 [[id number]] Voiding: YES Catheter: YES Drain:   
 
 
 
 
 
Flatus: Patient does have flatus present. Stool:  1 occurrences. Characteristics: 
  
 
Emesis: 0 occurrences. Characteristics: VITAL SIGNS Patient Vitals for the past 12 hrs: 
 Temp Pulse Resp BP SpO2  
07/31/19 2031 98.9 °F (37.2 °C) 77 18 104/56 95 % 07/31/19 1918     93 % 07/31/19 1618 98 °F (36.7 °C) 78 18 112/69 100 % 07/31/19 1428  75 18 140/80 96 % 07/31/19 1104 98.1 °F (36.7 °C) 71 16 125/68 98 % Pain Assessment Pain Intensity 1: 0 (07/31/19 1950) Pain Location 1: Shoulder Pain Intervention(s) 1: Medication (see MAR) Patient Stated Pain Goal: 0 Ambulating Yes- with assistance. Shift report given to oncoming nurse at the bedside.  
 
Aravind Phelps RN

## 2019-08-01 NOTE — PROGRESS NOTES
Progress Note 2019 Admit Date: 2019  4:43 PM  
NAME: Jassi Garibay :  1950 MRN:  [id number] Attending: Elsa Campo MD 
PCP:  Joao Connolly MD 
Treatment Team: Attending Provider: Elsa Campo MD; Care Manager: Corina Muñoz, RN; Utilization Review: Mary Kay Bal; Consulting Provider: Soumya Hampton MD; Consulting Provider: Matt Cruz NP; Physical Therapist: Ras Rodriguez PT, DPT; Physical Therapist: Cameron Esparza 
 
DNR SUBJECTIVE:  
Ms. Diaz is a 67 yo female with PMH of CAD, systolic CHF, ICD, CKD 4, HTN, hyperlipidemia, MDS, COPD, [facility] small [facility] mass with plans for endoscopy this week who presented from rehab with increased in LE edema, SOB. She was found with a blank stare and minimally responsive PTA. CT head pending. Pt found to have PH 7.211, CO2, 99.5, O2 70, HCO3 39.9. BNP increased from baseline. ICD interrogated, NO shocks identified. Pt was supposed to be directly admitted today for heparin bridge for plans for endoscopy later this week. Cardiology consulted. Pt on 3L O2, NC. She was started on lasix IV BID. Pt does not recall syncopal type episode PTA. Pt started on heparin gtt  in anticipation for endoscopy tomorrow for evaluation of duodenal mass. Long discussion with pt yesterday in regards to goals of care. Pt expressed wishes to be DNR, start Palliative Care in anticipation of Hospice at some point. Also states regardless of results of tomorrow's endoscopy she will not want any treatment (surgical or non surgical). Pt denies CP, n/v/d, abd pain. Of note pt does have limited ROM RUE that is chronic per daughter from presumed CVA vs Ortho injury. She is able to moves her fingers and with significant effort move her arm minimally. : Patient alert and oriented x 3.  On normal home oxygen amount of 3 liters with saturation noted 96%. Arterial CO2 improved at 73.1 this am, patient states breathing much improved. Afebrile, hemoglobin baseline of 8.3. Creatinine improved this am 1.56 from 1.73 yesterday. I spoke with CM and will plan for Friday discharge to patient home per patient and family request. Palliative care following and plans to follow outpatient. BNP worse this am; cardiology following and aware. Patient with good UOP, however. Continuing IV Lasix for now then convert to oral. 
GI has signed off today, no procedure to be done due to life expectancy versus adenoma treatment. Will follow hemoglobin closely. Procrit 60,000 units that patient normally takes monthly ordered today. Past Medical History:  
Diagnosis Date  Anticoagulated on Coumadin 7/9/2013 S/P AVR  CAD (coronary artery disease) 1/20/2013 5/8/14 PCI LAD with stent placed  Cardiomyopathy ([address]  CHF (congestive heart failure) ([address] 2/17/2017  CKD (chronic kidney disease) stage 3, GFR 30-59 ml/min ([facility]) 7/10/2013  COPD (chronic obstructive pulmonary disease) ([address] 4/2/2014  Diabetes ([address]  Essential hypertension, benign 1/20/2013  HLD (hyperlipidemia)  ICD (implantable cardioverter-defibrillator) in place 10/2/2014 Biotronik single-chamber ICD implantation 10/20/14  Iron deficiency anemia due to chronic blood loss 7/29/2009  MDS (myelodysplastic syndrome) ([address] 12/17/2011 Procrit started in August, 2011 12/18/11 Procrit weekly and Iron stores. 5-12 12-13-12 good response to 3 weekly procrit did not need it last time  3-7-13 Pt doing well. Just wanted a \"check-up. \" Responding to Procrit every three weeks. 8-29-13 patient has missed some injections on recently restarted hemoglobin only issue , takes oral iron iron stores each time  REJI (obstructive sleep apnea)-cpap 4/2/2014  Osteoarthritis  Osteopenia  Pulmonary hypertension ([address] 6/15/2016  Quadrantanopia  Skin defect 11/1/2018  Visual complaint 12/14/2015 Recent Results (from the past 24 hour(s)) POC G3 Collection Time: 08/01/19  4:06 AM  
Result Value Ref Range Device: NASAL CANNULA pH (POC) 7.428 7.35 - 7.45    
 pCO2 (POC) 73.1 (HH) 35 - 45 MMHG  
 pO2 (POC) 101 (H) 75 - 100 MMHG  
 HCO3 (POC) 48.3 (H) 22 - 26 MMOL/L  
 sO2 (POC) 98 95 - 98 % Base excess (POC) 21 mmol/L Allens test (POC) YES Site LEFT RADIAL Patient temp. 98.6 Specimen type (POC) ARTERIAL Performed by [email or web address]   
 CO2, POC >50 MMOL/L Flow rate (POC) 3.000 L/min Respiratory comment: NurseNotified COLLECT TIME 405 PROTHROMBIN TIME + INR Collection Time: 08/01/19  4:48 AM  
Result Value Ref Range Prothrombin time 17.4 (H) 11.7 - 14.5 sec INR 1.5    
CBC WITH AUTOMATED DIFF Collection Time: 08/01/19  4:48 AM  
Result Value Ref Range WBC 5.6 4.3 - 11.1 K/uL  
 RBC 2.97 (L) 4.05 - 5.2 M/uL HGB 8.3 (L) 11.7 - 15.4 g/dL HCT 28.0 (L) 35.8 - 46.3 % MCV 94.3 79.6 - 97.8 FL  
 MCH 27.9 26.1 - 32.9 PG  
 MCHC 29.6 (L) 31.4 - 35.0 g/dL  
 RDW 15.5 (H) 11.9 - 14.6 % PLATELET 055 (L) 297 - 450 K/uL MPV 12.0 9.4 - 12.3 FL ABSOLUTE NRBC 0.00 0.0 - 0.2 K/uL  
 DF AUTOMATED NEUTROPHILS 67 43 - 78 % LYMPHOCYTES 18 13 - 44 % MONOCYTES 11 4.0 - 12.0 % EOSINOPHILS 2 0.5 - 7.8 % BASOPHILS 1 0.0 - 2.0 % IMMATURE GRANULOCYTES 0 0.0 - 5.0 %  
 ABS. NEUTROPHILS 3.8 1.7 - 8.2 K/UL  
 ABS. LYMPHOCYTES 1.0 0.5 - 4.6 K/UL  
 ABS. MONOCYTES 0.6 0.1 - 1.3 K/UL  
 ABS. EOSINOPHILS 0.1 0.0 - 0.8 K/UL  
 ABS. BASOPHILS 0.0 0.0 - 0.2 K/UL  
 ABS. IMM. GRANS. 0.0 0.0 - 0.5 K/UL METABOLIC PANEL, BASIC Collection Time: 08/01/19  4:48 AM  
Result Value Ref Range Sodium 145 136 - 145 mmol/L Potassium 3.7 3.5 - 5.1 mmol/L Chloride 95 (L) 98 - 107 mmol/L  
 CO2 44 (HH) 21 - 32 mmol/L Anion gap 6 (L) 7 - 16 mmol/L Glucose 95 65 - 100 mg/dL BUN 41 (H) 8 - 23 MG/DL Creatinine 1.56 (H) 0.6 - 1.0 MG/DL  
 GFR est AA 42 (L) >60 ml/min/1.73m2 GFR est non-AA 35 (L) >60 ml/min/1.73m2 Calcium 9.5 8.3 - 10.4 MG/DL  
BNP Collection Time: 08/01/19  4:48 AM  
Result Value Ref Range BNP 1,406 (H) 0 pg/mL Allergies Allergen Reactions  Ferrlecit [Sodium Ferric Gluconat-Sucrose] Other (comments) Chest pain, reported MI after administration  Sulfa (Sulfonamide Antibiotics) Hives  Aspirin Nausea Only Cannot take uncoated aspirin Current Facility-Administered Medications Medication Dose Route Frequency Provider Last Rate Last Dose  epoetin jairo-epbx (RETACRIT) 60,000 Units combo injection  60,000 Units SubCUTAneous Mindymine Blas NP      
 warfarin (COUMADIN) tablet 4 mg  4 mg Oral QPM Shima Rodrigez NP   4 mg at 07/31/19 1813  
 docusate sodium (COLACE) capsule 100 mg  100 mg Oral DAILY Aniceto Sandhoff, MD   100 mg at 08/01/19 1547  lidocaine 4 % patch 2 Patch  2 Patch TransDERmal Q24H Austin Warren NP   2 Patch at 08/01/19 1058  traMADol (ULTRAM) tablet 50 mg  50 mg Oral Q6H PRN Zhao SAEZ NP   50 mg at 07/30/19 1425  carvedilol (COREG) tablet 6.25 mg  6.25 mg Oral BID WITH MEALS Aniceto Sandhoff, MD   6.25 mg at 08/01/19 4890  aspirin delayed-release tablet 81 mg  81 mg Oral DAILY Aniceto Sandhoff, MD   81 mg at 08/01/19 0900  
 albuterol (PROVENTIL VENTOLIN) nebulizer solution 2.5 mg  2.5 mg Nebulization Q4H PRN Aniceto Sandhoff, MD      
 acetaminophen (TYLENOL) tablet 650 mg  650 mg Oral Q6H PRN Aniceto Sandhoff, MD      
 fluticasone propionate [facility]) 50 mcg/actuation nasal spray 2 Spray  2 Spray Both Nostrils DAILY PRN Aniceto Sandhoff, MD      
 ferrous gluconate 324 mg (38 mg iron) tablet 1 Tab  1 Tab Oral BID WITH MEALS Aniceto Sandhoff, MD   1 Tab at 08/01/19 0431  traZODone (DESYREL) tablet 50 mg  50 mg Oral QHS PRN Aniceto Sandhoff, MD   50 mg at 19 0033  sertraline (ZOLOFT) tablet 50 mg  50 mg Oral DAILY Jasmina Vigil MD   50 mg at 19 0911  
 pantoprazole (PROTONIX) tablet 40 mg  40 mg Oral ACB Jasmina Vigil MD   40 mg at 19 0630  
 simvastatin (ZOCOR) tablet 20 mg  20 mg Oral QHS Jasmina Vigil MD   Stopped at 19 2234  ondansetron (ZOFRAN) injection 4 mg  4 mg IntraVENous Q6H PRN Jasmina Vigil MD      
 furosemide (LASIX) injection 40 mg  40 mg IntraVENous BID Jasmina Vigil MD   40 mg at 19 0912  
 docusate sodium (COLACE) capsule 100 mg  100 mg Oral PRN Jasmina Vigil MD   100 mg at 19 9888  budesonide (PULMICORT) 500 mcg/2 ml nebulizer suspension  500 mcg Nebulization BID RT Jasmina Vigil MD   500 mcg at 19 2925 And  
 albuterol (PROVENTIL VENTOLIN) nebulizer solution 2.5 mg  2.5 mg Nebulization Q6HWA RT Jasmina Vigil MD   2.5 mg at 19 6621 Review of Systems negative with exception of pertinent positives noted above PHYSICAL EXAM  
 
Visit Vitals /78 Pulse 68 Temp 98.2 °F (36.8 °C) Resp 18 Ht 5' 2\" (1.575 m) Wt 99.7 kg (219 lb 11.2 oz) SpO2 96% BMI 40.18 kg/m² Temp (24hrs), Av.3 °F (36.8 °C), Min:98 °F (36.7 °C), Max:98.9 °F (37.2 °C) Oxygen Therapy O2 Sat (%): 96 % (19 1206) Pulse via Oximetry: 56 beats per minute (19 0733) O2 Device: Nasal cannula (19) O2 Flow Rate (L/min): 3 l/min (19 08) Intake/Output Summary (Last 24 hours) at 2019 1314 Last data filed at 2019 1206 Gross per 24 hour Intake  Output 3000 ml Net -3000 ml General:       No acute distress, appears chronically ill  
Lungs:          CTA bilaterally. Resp even and nonlabored Heart:            S1S2 present without murmur rub gallops.  RRR. No LE edema Abdomen:    Soft, non tender, non distended. BS present Extremities: Moves ext spontaneously however limited ROM RUE chronic. No cyanosis Neurologic:  A/O X3. No focal deficits Results summary of Diagnostic Studies/Procedures copied from within [facility] EMR: 
 
 
 
ASSESSMENT Active Hospital Problems Diagnosis Date Noted  Adenomatous duodenal polyp 07/31/2019  Hypercapnic respiratory failure ([facility] 07/29/2019  CHF exacerbation ([facility] 07/28/2019  Systolic CHF, acute on chronic ([facility] 12/31/2018  Type 2 diabetes mellitus with nephropathy ([facility] 12/18/2017  S/P AVR (aortic valve replacement) Mechanical/Artific  Cardiomyopathy ([facility]  HLD (hyperlipidemia)  ICD (implantable cardioverter-defibrillator) in place 10/02/2014 Bi[employer]ronik single-chamber ICD implantation 10/20/14  REJI (obstructive sleep apnea)-cpap 04/02/2014  COPD (chronic obstructive pulmonary disease) ([facility] 04/02/2014 HOME OXYGEN 3 LITERS 
  
 CKD (chronic kidney disease) stage 4, GFR 15-29 ml/min ([facility]) 07/10/2013  Essential hypertension, benign 01/20/2013  CAD (coronary artery disease) 01/20/2013 5/8/14 PCI LAD with stent placed  MDS (myelodysplastic syndrome) ([facility] 12/17/2011 Procrit started in August, 2011 12/18/11 Procrit weekly and Iron stores. 5-12 12-13-12 good response to 3 weekly procrit did not need it last time 3-7-13 Pt doing well. Just wanted a \"check-up. \" Responding to Procrit every three weeks. 8-29-13 patient has missed some injections on recently restarted hemoglobin only issue , takes oral iron iron stores each time Plan: 
 
Acute on chronic hypoxic resp failure with hypercapnia On baseline O2 at 3 L 
? Home compliance with trilogy CXR with suspected worsening mild interstitial edema Continue lasix Watch renal function, down today Strict I/O Cardiology following Repeat ABG this am shows improvement. 
  
Duodenal adenoma GI signed off today Recommend to follow hemoglobin closely. Procedure not done yesterday related to patient life expectancy versus adenoma treatment. Hemoglobin baseline: 8.3. 
  
MDS Follow counts closely Patient concerned with missing monthly Procrit dose yesterday; I have ordered this today. 
  
HTN Controlled Continue current regimen No ACE/ARB due to CKD 
  
CKD 4 Holding ACE/ARB Creatinine trending down, follow closely Has hx of worsening renal function with diuresis CMP in AM 
  
REJI Has home trilogy 
  
S/p AVR Coumadin resumed by cardiology 
 
  
DM II 
A1C 5.8 in April 2019 
  
Acute on chronic systolic CHF Strict I/Os Monitor renal function closely, trending down today Cardiology following No ACE/ARB given CKD Continue coreg Echo shows EF 20-25%, grade 2 DD, markedly dilated atrium BNP trending up this am continue with IV Lasix then oral. 
 
**Family meeting planned for this Saturday, daughter-Ambika-getting  and returning Friday. Per CM, family wants her home although they acknowledge this will most likely not work and patient will return.  
  
Notes, labs, VS, diagnostic testing reviewed Time spent with pt 20 min 
  
  
DVT Prophylaxis: Coumadin Plan of Care Discussed with: Supervising MD Dr. Tiff Crowell, care team, pt 
  
 
Silvia Godwin, DOROTA

## 2019-08-01 NOTE — PROGRESS NOTES
[facility] Progress Note Admit Date:  7/28/2019 Today's Date:  8/1/2019 CC:  Duodenal lesion, anemia Subjective:  
 
Patient with stable hgb. Denies bleeding. No complaints at present. Medications:  
Current Facility-Administered Medications Medication Dose Route Frequency  warfarin (COUMADIN) tablet 4 mg  4 mg Oral QPM  
 docusate sodium (COLACE) capsule 100 mg  100 mg Oral DAILY  lidocaine 4 % patch 2 Patch  2 Patch TransDERmal Q24H  
 traMADol (ULTRAM) tablet 50 mg  50 mg Oral Q6H PRN  
 carvedilol (COREG) tablet 6.25 mg  6.25 mg Oral BID WITH MEALS  
 aspirin delayed-release tablet 81 mg  81 mg Oral DAILY  albuterol (PROVENTIL VENTOLIN) nebulizer solution 2.5 mg  2.5 mg Nebulization Q4H PRN  
 acetaminophen (TYLENOL) tablet 650 mg  650 mg Oral Q6H PRN  
 fluticasone propionate (FLONASE) 50 mcg/actuation nasal spray 2 Spray  2 Spray Both Nostrils DAILY PRN  
 ferrous gluconate 324 mg (38 mg iron) tablet 1 Tab  1 Tab Oral BID WITH MEALS  traZODone (DESYREL) tablet 50 mg  50 mg Oral QHS PRN  
 sertraline (ZOLOFT) tablet 50 mg  50 mg Oral DAILY  pantoprazole (PROTONIX) tablet 40 mg  40 mg Oral ACB  simvastatin (ZOCOR) tablet 20 mg  20 mg Oral QHS  ondansetron (ZOFRAN) injection 4 mg  4 mg IntraVENous Q6H PRN  
 furosemide (LASIX) injection 40 mg  40 mg IntraVENous BID  docusate sodium (COLACE) capsule 100 mg  100 mg Oral PRN  
 budesonide (PULMICORT) 500 mcg/2 ml nebulizer suspension  500 mcg Nebulization BID RT And  
 albuterol (PROVENTIL VENTOLIN) nebulizer solution 2.5 mg  2.5 mg Nebulization Q6HWA RT Review of Systems: ROS was obtained, with pertinent positives as listed above. No chest pain or SOB. Diet:  Cardiac regular Objective:  
Vitals: 
Visit Vitals /77 Pulse 72 Temp 98.1 °F (36.7 °C) Resp 18 Ht 5' 2\" (1.575 m) Wt 99.7 kg (219 lb 11.2 oz) SpO2 97% BMI 40.18 kg/m² Intake/Output: No intake/output data recorded. 07/30 1901 - 08/01 0700 In: 46 [I.V.:51] Out: 4200 [Urine:4200] Exam: 
General appearance: alert, cooperative, no distress Lungs: clear to auscultation bilaterally anteriorly;O2 per nasal cannula at 3L Heart: regular rate and rhythm Abdomen: soft, non-tender. Bowel sounds normal. No masses, no organomegaly Extremities: extremities normal, atraumatic, no cyanosis or edema Neuro:  alert and oriented Data Review (Labs):   
Recent Labs 08/01/19 
0448 07/31/19 
1240 07/30/19 
1908 07/30/19 
1132 07/30/19 
0354 07/29/19 
1944 WBC 5.6 5.4  --   --  6.2  --   
HGB 8.3* 8.1*  --   --  8.0*  --   
HCT 28.0* 27.1*  --   --  26.6*  --   
* 133*  --   --  125*  --   
MCV 94.3 93.4  --   --  94.3  --   
 145  --   --  142  --   
K 3.7 4.2  --   --  3.7  --   
CL 95* 98  --   --  97*  --   
CO2 44* 43*  --   --  40*  --   
BUN 41* 46*  --   --  52*  --   
CREA 1.56* 1.73*  --   --  2.15*  --   
CA 9.5 9.3  --   --  8.9  --   
GLU 95 90  --   --  112*  --   
AP  --   --   --   --  63  --   
SGOT  --   --   --   --  13*  --   
ALT  --   --   --   --  15  --   
TBILI  --   --   --   --  0.7  --   
ALB  --   --   --   --  2.8*  --   
TP  --   --   --   --  6.2*  --   
PTP 17.4* 18.9*  --   --  22.3*  --   
INR 1.5 1.6  --   --  2.0  --   
APTT  --  103.7* 89.1* 60.0* 168.5* 177.3* Assessment:  
 
Principal Problem: Hypercapnic respiratory failure ([address] (7/29/2019) Active Problems: 
  MDS (myelodysplastic syndrome) ([facility] (12/17/2011) Overview: Procrit started in August, 2011 12/18/11 Procrit weekly and Iron stores. 5-12 12-13-12 good response to 3 weekly procrit did not need it last time 3-7-13 Pt doing well. Just wanted a \"check-up. \" Responding to Procrit  
    every three weeks. 8-29-13 patient has missed some injections on recently restarted hemoglobin only issue , takes oral iron iron stores each time CAD (coronary artery disease) (1/20/2013) Overview: 5/8/14 PCI LAD with stent placed Essential hypertension, benign (1/20/2013) CKD (chronic kidney disease) stage 4, GFR 15-29 ml/min ([facility]) (7/10/2013) COPD (chronic obstructive pulmonary disease) ([address] (4/2/2014) Overview: HOME OXYGEN 3 LITERS 
 
  REJI (obstructive sleep apnea)-cpap (4/2/2014) ICD (implantable cardioverter-defibrillator) in place (10/2/2014) Overview: Biotronik single-chamber ICD implantation 10/20/14 HLD (hyperlipidemia) () 
 
  S/P AVR (aortic valve replacement) () Overview: Mechanical/Artific Cardiomyopathy ([address] () Type 2 diabetes mellitus with nephropathy ([address] (12/18/2017) Systolic CHF, acute on chronic ([address] (12/31/2018) CHF exacerbation ([address] (7/28/2019) Adenomatous duodenal polyp (7/31/2019) 68 y. o. female with pmh CKD stage IV, COPD, myelodysplasic syndrome w anemia, HTN, HLD, CAD w cardiac arrest, mechanical AVR on Coumadin with ECHO Nov 2018 w EF 25% w severe hypokinesis, and MR s/p Biotronik ICD, admitted w acute on chronic CHF.   EGD/COLO June 11, 2019 w findings of IH, tics, colon polyps, gastritis and duodenal mass w ulceration which was thought to be sources of pt anemia.  Duodenal mass pathology came back as adenomatous polyp. She was originally scheduled for heparin window and EGD w EMR on 8/2 for duodenal mass with ulceration but was admitted 7/28 with SOB, edema and altered mental status. Improved with IV Lasix bid. After discussion with anesthesia and the risk vs benefit of EGD/EMR, procedure was canceled. Remains on heparin gtt and Coumadin restarted. Plan: 1. Follow hgb 2. No endoscopic evaluation planned at this point 3. We will sign off; please call as needed Patient is seen and examined in collaboration with Dr. Anya Chatman. Assessment and plan as per Dr. Chetan Acevedo.  
Vilma Hernandez NP

## 2019-08-01 NOTE — PROGRESS NOTES
Palliative Care Progress Note Patient: Malvin Claros MRN: [id number]  SSN: [id number] YOB: 1950  Age: 68 y.o. Sex: female Assessment/Plan: Chief Complaint/Interval History: pt resting comfortably. Notes some right shoulder pain which is chronic. Expresses her wishes to go home Principal Diagnosis: · Debility, Unspecified  R53.81 Additional Diagnoses: · Fatigue, Lethargy  R53.83 · Respiratory Failure, Acute on Chronic  J96.20 · Counseling, Encounter for Medical Advice  Z71.9 
· Encounter for Palliative Care  Z51.5 Palliative Performance Scale (PPS) PPS: 40 Medical Decision Making:  
Reviewed and summarized labs and imaging. I met with pt at bedside. No family present. Pt notes fatigue and rt shoulder pain. Pain improves with tramadol per pt. I discussed current medical care and pt wishes. Pt states her daughters are meeting on Saturday. Pt wishes to return home. She has a cltc aid in home. Would likely benefit from home palliative care as well. Will discuss findings with members of the interdisciplinary team.   
 
More than 50% of this 20 minute visit was spent counseling and coordination of care as outlined above. Subjective:  
 
Review of Systems negative with the exception of as noted above Objective:  
 
Visit Vitals /77 Pulse 72 Temp 98.1 °F (36.7 °C) Resp 18 Ht 5' 2\" (1.575 m) Wt 219 lb 11.2 oz (99.7 kg) SpO2 97% BMI 40.18 kg/m² Physical Exam: 
 
General:  Cooperative. No acute distress. Eyes:  Conjunctivae/corneas clear Nose: Nares normal. Septum midline. Neck: Supple, symmetrical, trachea midline, no JVD Lungs:   Clear to auscultation bilaterally, unlabored Heart:  Regular rate and rhythm, no murmur Abdomen:   Soft, non-tender, non-distended Extremities: Normal, atraumatic, no cyanosis or edema Skin: Skin color, texture, turgor normal. No rash or lesions.   
Neurologic: Nonfocal  
 Psych: Alert and oriented Signed By: Joao Hagan MD   
 August 1, 2019

## 2019-08-01 NOTE — PROGRESS NOTES
ABG performed on 3L NC. Pt stated that machine does not \"feel right\" and that \"it pushes air in when I try and breathe out\". Pt does not want to wear machine at this time. Per nursing, pt had SpO2 84% on machine with 3L bleed in.

## 2019-08-01 NOTE — PROGRESS NOTES
[facility] [[email or web address]] CARDIOLOGY PROGRESS NOTE 
      
 
8/1/2019 6:58 AM 
 
Admit Date: 7/28/2019 Subjective:  
Patient resting in bed with 3L nasal cannula. Denies chest pain or shortness of breath but appears visibly short of breath with abdominal breathing. Could not tolerate trilogy last night. Surgery with GI was cancelled yesterday and coumadin resumed. Edema has improved. BNP rising. UOP good in past 24 hours. Palliative care following and recommended home based palliative care. ROS: 
Cardiovascular:  As noted above NSR with PVC Objective:  
  
Vitals:  
 08/01/19 2786 08/01/19 8817 08/01/19 4176 08/01/19 9732 BP: 125/81   142/67 Pulse: 71   71 Resp: 18   18 Temp: 98.3 °F (36.8 °C)   98.4 °F (36.9 °C) SpO2: 95% 97%  93% Weight:   219 lb 11.2 oz (99.7 kg) Height:      
 
 
Physical Exam: 
General-No Acute Distress Neck- supple, no JVD 
CV- regular rate and rhythm no MRG Lung- lower diminished rales bilaterally Abd- soft, nontender, non-distended, obese, abdominal breathing Ext- trace pitting edema in BLE Skin- warm and dry Data Review:  
Recent Labs 08/01/19 
0448 07/31/19 
1939  145  
K 3.7 4.2 BUN 41* 46* CREA 1.56* 1.73* GLU 95 90 WBC 5.6 5.4 HGB 8.3* 8.1* HCT 28.0* 27.1*  
* 133* INR 1.5 1.6 Assessment/Plan:  
 
Principal Problem: Hypercapnic respiratory failure ([facility] (7/29/2019) On home dose of O2 at 3 L. Could not tolerate trilogy last night. Active Problems: 
  MDS (myelodysplastic syndrome) ([facility] (12/17/2011) The current medical regimen is effective;  continue present plan and medications. CAD (coronary artery disease) (1/20/2013) On ASA, BB, statin. Holding ACE/ARB d/t renal function Essential hypertension, benign (1/20/2013) The current medical regimen is effective;  continue present plan and medications. CKD (chronic kidney disease) stage 4, GFR 15-29 ml/min ([facility]) (7/10/2013) Improving; down to Cr 1.56, GFR 42 today COPD (chronic obstructive pulmonary disease) ([address] (4/2/2014) The current medical regimen is effective;  continue present plan and medications. REJI (obstructive sleep apnea)-cpap (4/2/2014) The current medical regimen is effective;  continue present plan and medications. ICD (implantable cardioverter-defibrillator) in place (10/2/2014) Good function HLD (hyperlipidemia) () The current medical regimen is effective;  continue present plan and medications. S/P AVR (aortic valve replacement) () The current medical regimen is effective;  continue present plan and medications. Cardiomyopathy ([address] () ICD in place Type 2 diabetes mellitus with nephropathy ([address] (12/18/2017) The current medical regimen is effective;  continue present plan and medications. Systolic CHF, acute on chronic ([address] (12/31/2018) BNP worsening, on lasix IVP 
 
  CHF exacerbation ([address] (7/28/2019) On lasix Adenomatous duodenal polyp (7/31/2019) Surgery canceled GI recommendation to cancel surgery due to life expectancy being less than the risk of adenoma growth and malignancy considering her cardiorespiratory status. Family and patient elected to cancel surgery. Coumadin resumed. Renal function improving but BNP worsening (1406 today). Almost 3 L UOP in the past 24 hours. Minimal edema in BLE but diminished crackles in bases; CXR on 7/28 showed worsening mild interstitial edema. IV lasix for a couple of doses then po. Call if needed Landon Chance RN 
8/1/2019 6:58 AM

## 2019-08-01 NOTE — PROGRESS NOTES
Warfarin dosing per pharmacist 
 
Donald Vaz Leo Chou is a 68 y.o. female. Height: 5' 2\" (157.5 cm)    Weight: 99.7 kg (219 lb 11.2 oz) Indication:  Mechanical AVR Goal INR:  2-3 Home dose:  4 mg qhs 
 
Risk factors or significant drug interactions:  none Other anticoagulants:  -- 
 
Daily Monitoring Date  INR     Warfarin dose HGB              Notes 7/28  2.2  Held  8.7 
7/29  2.2  Held  8.2 7/30  2  Held  8 
7/31  1.6  4 mg  8.1 8/1  1.5  4 mg  8.3 Pharmacy is consulted to resume warfarin for Ms. Corral. Her INR was therapeutic on admission, warfarin has been held for EGD. INR @ 1.5 today. First dose of warfarin given last night after holding for EGD. Will continue with home dosing of 4mg tonight and monitor INR. Pharmacy will continue to follow. Please call with any questions. Thank you, 
Lolis Clark PharmD 
[phone]

## 2019-08-01 NOTE — PROGRESS NOTES
Patient complained of abd pain. Patient states \"it feels like I have to poop but I don't have to poop. \" Patient denies SOB and chest pains. Monitor room states patient is at NSR 77 bpm. Administered colace.

## 2019-08-02 NOTE — PROGRESS NOTES
Patient has been placed on home Trilogy with 3 liters bled into machine. 08/01/19 2143 Oxygen Therapy O2 Sat (%) 94 % Pulse via Oximetry 76 beats per minute O2 Device Ventilator (Home Trilogy) O2 Flow Rate (L/min) 3 l/min FIO2 (%) 32 % CPAP/BIPAP  
CPAP/BIPAP Start/Stop On Device Mode AVAP Mask Type and Size Full face PIP Observed 28.7 cm H20 Vt Spont (ml) 463 ml Ve Observed (l/min) 9 l/min Total RR (Spontaneous) 22 breaths per minute Leak (Estimated) 47 L/min  
Pt's Home Machine Yes (type/vendor) (Home Trilogy)

## 2019-08-02 NOTE — DISCHARGE INSTRUCTIONS
Patient Education        Shortness of Breath: Care Instructions  Your Care Instructions  Shortness of breath has many causes. Sometimes conditions such as anxiety can lead to shortness of breath. Some people get mild shortness of breath when they exercise. Trouble breathing also can be a symptom of a serious problem, such as asthma, lung disease, emphysema, heart problems, and pneumonia. If your shortness of breath continues, you may need tests and treatment. Watch for any changes in your breathing and other symptoms. Follow-up care is a key part of your treatment and safety. Be sure to make and go to all appointments, and call your doctor if you are having problems. It's also a good idea to know your test results and keep a list of the medicines you take. How can you care for yourself at home? · Do not smoke or allow others to smoke around you. If you need help quitting, talk to your doctor about stop-smoking programs and medicines. These can increase your chances of quitting for good. · Get plenty of rest and sleep. · Take your medicines exactly as prescribed. Call your doctor if you think you are having a problem with your medicine. · Find healthy ways to deal with stress. ? Exercise daily. ? Get plenty of sleep. ? Eat regularly and well. When should you call for help? Call 911 anytime you think you may need emergency care. For example, call if:    · You have severe shortness of breath.     · You have symptoms of a heart attack. These may include:  ? Chest pain or pressure, or a strange feeling in the chest.  ? Sweating. ? Shortness of breath. ? Nausea or vomiting. ? Pain, pressure, or a strange feeling in the back, neck, jaw, or upper belly or in one or both shoulders or arms. ? Lightheadedness or sudden weakness. ? A fast or irregular heartbeat. After you call 911, the  may tell you to chew 1 adult-strength or 2 to 4 low-dose aspirin. Wait for an ambulance.  Do not try to drive yourself.    Call your doctor now or seek immediate medical care if:    · Your shortness of breath gets worse or you start to wheeze. Wheezing is a high-pitched sound when you breathe.     · You wake up at night out of breath or have to prop your head up on several pillows to breathe.     · You are short of breath after only light activity or while at rest.    Watch closely for changes in your health, and be sure to contact your doctor if:    · You do not get better over the next 1 to 2 days. Where can you learn more? Go to http://macrina-thuy.info/. Enter S780 in the search box to learn more about \"Shortness of Breath: Care Instructions. \"  Current as of: September 5, 2018  Content Version: 12.1  © 1181-4846 [employer]. Care instructions adapted under license by [employer] (which disclaims liability or warranty for this information). If you have questions about a medical condition or this instruction, always ask your healthcare professional. [place] [address] any warranty or liability for your use of this information. DISCHARGE SUMMARY from Nurse    PATIENT INSTRUCTIONS:    After general anesthesia or intravenous sedation, for 24 hours or while taking prescription Narcotics:  · Limit your activities  · Do not drive and operate hazardous machinery  · Do not make important personal or business decisions  · Do  not drink alcoholic beverages  · If you have not urinated within 8 hours after discharge, please contact your surgeon on call.     Report the following to your surgeon:  · Excessive pain, swelling, redness or odor of or around the surgical area  · Temperature over 100.5  · Nausea and vomiting lasting longer than 4 hours or if unable to take medications  · Any signs of decreased circulation or nerve impairment to extremity: change in color, persistent  numbness, tingling, coldness or increase pain  · Any questions    What to do at Home:  Recommended activity: Activity as tolerated. If you experience any of the following symptoms:  Fever greater than 100.5,  Shortness of breath/ wheezing,  please follow up with your doctor. *  Please give a list of your current medications to your Primary Care Provider. *  Please update this list whenever your medications are discontinued, doses are      changed, or new medications (including over-the-counter products) are added. *  Please carry medication information at all times in case of emergency situations. These are general instructions for a healthy lifestyle:    No smoking/ No tobacco products/ Avoid exposure to second hand smoke  Surgeon General's Warning:  Quitting smoking now greatly reduces serious risk to your health. Obesity, smoking, and sedentary lifestyle greatly increases your risk for illness    A healthy diet, regular physical exercise & weight monitoring are important for maintaining a healthy lifestyle    You may be retaining fluid if you have a history of heart failure or if you experience any of the following symptoms:  Weight gain of 3 pounds or more overnight or 5 pounds in a week, increased swelling in our hands or feet or shortness of breath while lying flat in bed. Please call your doctor as soon as you notice any of these symptoms; do not wait until your next office visit. The discharge information has been reviewed with the patient. The patient verbalized understanding. Discharge medications reviewed with the patient and appropriate educational materials and side effects teaching were provided.   ___________________________________________________________________________________________________________________________________

## 2019-08-02 NOTE — ROUTINE PROCESS
CHF teaching completed, verbalize emphasis on monitoring self and report to MD: 
? If you gain 2 lbs in one day or 5 lbs in a week, and short of breath. ? If you can not lay flat without developing short of breath or rapid breathing at night; or if it wakes you up. Develop a cough or wheezing. ? If you notice swollen hands/feet/ankles or stomach with a bloated/ full feeling. ? If you are  more confused or mentally fuzzy or dizzy. ? If you notice a rapid or change in your heart rate. ? If you become more exhausted all the time and unable to do the same level of activity without stopping to catch your breath. Drink no more than 8 cups a day in 8 oz. cups. Limit Cola Drinks. Your Heart can not handle any more. Stay away from salt (limit anything with salt or sodium in it). Limit to 250mg per serving. Exercise needs to be started with your Doctors approval. 
Reduce stress; Call myself or Provider if assistance is needed. Pass post test via teach back, will make self available post DC ,if an questions arise. Diabetic teaching completed. Planner/scale @ BS:  60 mins total 
 
HH 
 
 
CHF teaching started post introduction to pt/family; aware of diagnosis. Planner/scale @ BS and will follow. Smoking/ ETOH/Illicit drug use cessation and maintain a healthy weight covered. Pt/family aware that I can not prescribe nor adjust  medications: 15mins Palliative Care score: ssen Refused ACP on admission Start 2L/D Fluid restriction/ cardiac diet CHF teaching continues to pt/family. Emphasis on taking prescription meds as ordered, to keep F/U appts and to call MD STAT if any of the following occur: ? If you gain 2 lbs in one day or 5 lbs in a week, and short of breath. ? If you can not lay flat without developing short of breath or rapid breathing at night; or if it wakes you up. Develop a cough or wheezing. ? If you notice swollen hands/feet/ankles or stomach with a bloated/ full feeling. ? If you become confused or mentally fuzzy or dizzy. ? If you notice a rapid or change in your heart rate. ? If you become more exhausted all the time and unable to do the same level of activity without stopping to catch your breath. Drink no more than 8 cups a day in 8 oz. cups. Your Heart can not handle any more. Stay away from salt (limit anything with salt or sodium in it). Limit to 250mg per serving. Pt/family verbalizes understanding, will follow to reinforce teaching skills: 20 mins

## 2019-08-02 NOTE — PROGRESS NOTES
Patient current with Transitions of Care/Community Care Team/CHF Bundle Team 
Discharging home today with [facility], [facility] Calls/Palliative Care. Patient now a DNR, plans to transition to Hospice when appropriate. Chart routed to CHF Bundle Health  and [address] [place] [address]

## 2019-08-02 NOTE — PROGRESS NOTES
Discharge instructions reviewed with patient. Patient verbalized/ esigned agreement/ understanding. Patient awaiting family arrival to transport home.

## 2019-08-02 NOTE — DISCHARGE SUMMARY
Discharge Summary Patient ID: 
Jassi Garibay [id number] 
19 y.o. 
1950 Admit date: 7/28/2019  4:43 PM 
Discharge date and time: 8/2/2019 Attending: Elsa Campo MD 
PCP:  Joao Connolly MD 
Treatment Team: Attending Provider: Elsa Campo MD; Care Manager: Corina Muñoz, RN; Utilization Review: Mary Kay Bal; Consulting Provider: Matt Cruz NP; Physical Therapist: Garret Ceballos DPT; Occupational Therapist: Idolina Cockayne, OT 
Principal Diagnosis Hypercapnic respiratory failure ([address] Principal Problem: Hypercapnic respiratory failure ([address] (7/29/2019) Active Problems: 
  MDS (myelodysplastic syndrome) ([address] (12/17/2011) Overview: Procrit started in August, 2011 12/18/11 Procrit weekly and Iron stores. 5-12 12-13-12 good response to 3 weekly procrit did not need it last time 3-7-13 Pt doing well. Just wanted a \"check-up. \" Responding to Procrit  
    every three weeks. 8-29-13 patient has missed some injections on recently restarted  
    hemoglobin only issue , takes oral iron iron stores each time CAD (coronary artery disease) (1/20/2013) Overview: 5/8/14 PCI LAD with stent placed Essential hypertension, benign (1/20/2013) CKD (chronic kidney disease) stage 4, GFR 15-29 ml/min ([facility]) (7/10/2013) COPD (chronic obstructive pulmonary disease) ([facility] (4/2/2014) Overview: HOME OXYGEN 3 LITERS 
 
  REJI (obstructive sleep apnea)-cpap (4/2/2014) ICD (implantable cardioverter-defibrillator) in place (10/2/2014) Overview: [employer] single-chamber ICD implantation 10/20/14 HLD (hyperlipidemia) () 
 
  S/P AVR (aortic valve replacement) () Overview: Mechanical/Artific Cardiomyopathy ([address] () Type 2 diabetes mellitus with nephropathy ([address] (12/18/2017) Systolic CHF, acute on chronic ([address] (12/31/2018) CHF exacerbation ([facility] (7/28/2019) Adenomatous duodenal polyp (7/31/2019) * Admission Diagnoses: CHF exacerbation ([facility] [I50.9] * Discharge Diagnoses:   
Hospital Problems as of 8/2/2019 Date Reviewed: 7/2/2019 Codes Class Noted - Resolved POA Adenomatous duodenal polyp ICD-10-CM: D13.2 ICD-9-CM: 211.2  7/31/2019 - Present Yes * (Principal) Hypercapnic respiratory failure ([facility] ICD-10-CM: X20.26 
ICD-9-CM: 518.81  7/29/2019 - Present Unknown CHF exacerbation (HCC) ICD-10-CM: I50.9 ICD-9-CM: 428.0  7/28/2019 - Present Yes Systolic CHF, acute on chronic (HCC) ICD-10-CM: U26.42 ICD-9-CM: 428.23, 428.0  12/31/2018 - Present Yes Type 2 diabetes mellitus with nephropathy (HCC) (Chronic) ICD-10-CM: E11.21 
ICD-9-CM: 250.40, 583.81  12/18/2017 - Present Yes S/P AVR (aortic valve replacement) (Chronic) ICD-10-CM: Z95.2 ICD-9-CM: V43.3  Unknown - Present Yes Overview Signed 2/23/2016 11:04 AM by Ruth Ramirez Mechanical/Artific Cardiomyopathy ([facility] (Chronic) ICD-10-CM: I42.9 ICD-9-CM: 425.4  Unknown - Present Yes HLD (hyperlipidemia) (Chronic) ICD-10-CM: B08.2 ICD-9-CM: 272.4  Unknown - Present Yes  
   
 ICD (implantable cardioverter-defibrillator) in place ICD-10-CM: Z95.810 ICD-9-CM: V45.02  10/2/2014 - Present Yes Overview Signed 10/2/2014  4:58 PM by Heidi Cali single-chamber ICD implantation 10/20/14 COPD (chronic obstructive pulmonary disease) (HCC) (Chronic) ICD-10-CM: J44.9 ICD-9-CM: 201  4/2/2014 - Present Yes Overview Signed 10/6/2018  8:56 PM by Nick Dasilva NP  
  HOME OXYGEN 3 LITERS 
  
  
   
 REJI (obstructive sleep apnea)-cpap (Chronic) ICD-10-CM: I39.45 
ICD-9-CM: 327.23  4/2/2014 - Present Yes  
   
 CKD (chronic kidney disease) stage 4, GFR 15-29 ml/min ([facility]) ICD-10-CM: N18.4 ICD-9-CM: 585.4  7/10/2013 - Present Yes CAD (coronary artery disease) (Chronic) ICD-10-CM: I25.10 ICD-9-CM: 414.00  1/20/2013 - Present Yes Overview Signed 5/20/2014 10:05 AM by Sushila Casey NP  
  5/8/14 PCI LAD with stent placed Essential hypertension, benign (Chronic) ICD-10-CM: I10 
ICD-9-CM: 401.1  1/20/2013 - Present Yes  
   
 MDS (myelodysplastic syndrome) (HCC) (Chronic) ICD-10-CM: D46.9 ICD-9-CM: 238.75  12/17/2011 - Present Yes Overview Addendum 8/29/2013 11:06 AM by Lowry Bence Procrit started in August, 2011 12/18/11 Procrit weekly and Iron stores. 5-12 12-13-12 good response to 3 weekly procrit did not need it last time 3-7-13 Pt doing well. Just wanted a \"check-up. \" Responding to Procrit every three weeks. 8-29-13 patient has missed some injections on recently restarted hemoglobin only issue , takes oral iron iron stores each time Hospital Course: Ms. Diaz is a 67 yo female with PMH of CAD, systolic CHF, ICD, CKD 4, HTN, hyperlipidemia, MDS, COPD, REJI on trilogy, [facility] AVR, small bowel mass with plans for endoscopy this week who presented from rehab with increased in LE edema, SOB.  She was found with a blank stare and minimally responsive PTA.  CT head without acute findings.  Pt found to have PH 7.211, CO2, 99.5, O2 70, HCO3 39.9.  BNP increased from baseline.  ICD interrogated, NO shocks identified.  Pt was supposed to be directly admitted  for heparin bridge for plans for endoscopy later this week. Cardiology consulted.  Pt on 3L O2, NC.  She was started on lasix IV BID. Pt does not recall syncopal type episode PTA.  Pt started on heparin gtt 7/29 in anticipation for endoscopy however procedure canceled. Long discussion with pt in regards to goals of care. Pt expressed wishes to be DNR, start Palliative Care in anticipation of Hospice at some point. Pt denies CP, n/v/d, abd pain.  Of note pt does have limited ROM RUE that is chronic per daughter from presumed CVA vs Ortho injury.   She is able to moves her fingers and with significant effort move her arm minimally. Diagnostic Study/Procedure results summary copied from within [facility] EMR: 
2 Days Post-Op  Procedure(s): PROCEDURE CANCELLED - NOT PERFORMED Examination: CT scan of the brain without contrast. 
  
History: Confusion/delirium, altered LOC, unexplained, 68 years Female Patient 
presents with daughter. Daughter states patient is supposed to use a walker to 
ambulate but does not always. Patient was found this afternoon face up, 
sideways, on the bed. Daughter states they assume syncopal episode, patient has 
a history of falls. Patient does not recall incident. Daughter brought patient 
because she is lethargic, patient was able to eat a small meal PTA. Patient 
denies pain or injuries. Patient on 3L NC at all times for COPD. Daughter states 
hx of HTN, took medication just PTA with meal 
  
Technique: 5 mm axial imaging of the brain from the posterior fossa to the 
vertex. Radiation dose reduction techniques were used for this study:  Our CT 
scanners use one or all of the following: Automated exposure control, adjustment 
of the mA and/or kVp according to patient's size, iterative reconstruction. 
  
Comparison:  CT brain September 07, 2018 
  
Findings: Probable mild microvascular disease unchanged. The ventricles, sulci 
are age-appropriate. No intracranial hemorrhage or extra-axial collection is 
identified. No evidence of acute infarct. No mass effect or midline shift is 
present. Basal cisterns are intact. Small fluid levels are seen in the 
bilateral maxillary sinuses. Mild mucosal reaction bilateral ethmoid sinuses. The visualized mastoid air cells are clear. The orbits, bones, and soft tissues 
are normal in appearance. Image quality somewhat degraded by motion artifact. 
  
IMPRESSION Impression:  No acute intracranial abnormality. Paranasal sinus mucosal disease 
as above.  
 
 
AP chest radiograph 
  
 History: syncope, 68 years Female 
  
Comparison: Chest radiograph June 07, 2019 
  
Findings:  Cardiac pacing device obscures part of the left chest wall, single 
lead appears to remain in anatomic position. Normal cardiomediastinal 
silhouette with evidence of CABG. Persistent mild hyperinflation suggestive of 
COPD. Nonspecific mild diffuse interstitial prominence appears slightly 
increased since prior, may represent edema in the appropriate clinical setting. Trace bilateral pleural effusions appear slightly increased since prior. Mild 
subsegmental atelectasis bilateral lung bases. No evidence of pneumothorax. Visualized soft tissue and osseous structures otherwise unremarkable.   
  
IMPRESSION Impression:  Suspect worsening mild interstitial edema. Labs: Results:  
   
Chemistry Recent Labs 08/02/19 
0517 08/01/19 0448 07/31/19 
8320 * 95 90  145 145  
K 3.8 3.7 4.2 CL 95* 95* 98  
CO2 >45* 44* 43* BUN 37* 41* 46* CREA 1.50* 1.56* 1.73* CA 9.4 9.5 9.3 AGAP Cannot be calculated 6* 4*  
  
CBC w/Diff Recent Labs 08/02/19 
1281 08/01/19 
0448 07/31/19 
4641 WBC 6.5 5.6 5.4  
RBC 2.96* 2.97* 2.90* HGB 8.5* 8.3* 8.1* HCT 28.0* 28.0* 27.1*  
 147* 133* GRANS 66 67 66 LYMPH 21 18 21 EOS 2 2 3 Cardiac Enzymes No results for input(s): CPK, CKND1, MAY in the last 72 hours. No lab exists for component: Shirlyn Re Coagulation Recent Labs 08/02/19 
1283 08/01/19 
0448 07/31/19 
6953  07/30/19 
1908 PTP 18.1* 17.4* 18.9*   < >  --   
INR 1.5 1.5 1.6   < >  --   
APTT  --   --  103.7*  --  89.1*  
 < > = values in this interval not displayed. Lipid Panel @BRIEFLAB(CHOL,CHOLPOCT,721294,[id number],CHOLX,CHOLP,CHLST,CHOLV,956350,HDL,HDLPOC,HDLPOCT,808058,NHDLCT,[id number],HDLC,HDLP,LDL,LDLPOCT,LDLCPOC,383962,NLDLCT,DLDL,LDLC,DLDLP,553466,VLDLC,VLDL,TGL,TGLX,TRIGL,[id number],TRIGP,TGLPOCT,149617,902641,CHHD,CHHDX)@ BNP No results for input(s): BNPP in the last 72 hours. Liver Enzymes No results for input(s): TP, ALB, TBIL, AP, SGOT, GPT in the last 72 hours. No lab exists for component: DBIL Thyroid Studies Lab Results Component Value Date/Time TSH 1.790 07/28/2019 10:51 PM  
    
 
 
Discharge Exam: 
Visit Aretta Ripple /64 Pulse 72 Temp 98.5 °F (36.9 °C) Resp 16 Ht 5' 2\" (1.575 m) Wt 93.5 kg (206 lb 3.2 oz) SpO2 94% BMI 37.71 kg/m² General:       No acute distress, appears chronically ill  
Lungs:          CTA bilaterally. Resp even and nonlabored Heart:            S1S2 present without murmur rub gallops.  RRR. No LE edema Abdomen:    Soft, non tender, non distended. BS present Extremities: Moves ext spontaneously however limited ROM RUE chronic. No cyanosis Neurologic:  A/O X3.  No focal deficits Disposition: HH Discharge Condition: stable Patient Instructions:  
Current Discharge Medication List  
  
START taking these medications Details  
traMADol (ULTRAM) 50 mg tablet Take 1 Tab by mouth every six (6) hours as needed for Pain for up to 3 days. Max Daily Amount: 200 mg. Qty: 10 Tab, Refills: 0 Associated Diagnoses: Osteoarthritis of shoulder, unspecified laterality, unspecified osteoarthritis type  
  
lidocaine 4 % patch Apply to right shoulder 
Qty: 10 Patch, Refills: 0  
  
epoetin jairo-epbx (RETACRIT) 10,000 unit/mL injection 2 mL by SubCUTAneous route Every Tues, Thur & Sat. Indications: anemia due to kidney failure Qty: 4 mL, Refills: 0 CONTINUE these medications which have NOT CHANGED Details  
warfarin (COUMADIN) 4 mg tablet Take 4 mg by mouth every evening. Indications: Blood Clot caused by Artificial Heart Valve  
  
pantoprazole (PROTONIX) 40 mg tablet Take 40 mg by mouth daily. furosemide (LASIX) 40 mg tablet TAKE 1 TABLET BY MOUTH EVERY DAY Qty: 30 Tab, Refills: 6 Associated Diagnoses: Essential hypertension, benign traZODone (DESYREL) 50 mg tablet TAKE 1/2-1 TABS BY MOUTH NIGHTLY. Qty: 60 Tab, Refills: 2 Associated Diagnoses: REJI (obstructive sleep apnea)  
  
carvedilol (COREG) 6.25 mg tablet Take 1 Tab by mouth two (2) times daily (with meals). Qty: 60 Tab, Refills: 3 Associated Diagnoses: Anemia, unspecified type; Acute kidney injury superimposed on chronic kidney disease ([address] Coronary artery disease involving native coronary artery of native heart without angina pectoris; Chronic combined systolic and diastolic heart failure ([address] Chronic respiratory failure with hypoxia ([address] Debility; MDS (myelodysplastic syndrome) ([address] Type 2 diabetes mellitus with nephropathy ([address] Essential hypertension, benign; Anticoagulated on Coumadin; CKD (chronic kidney disease) stage 3, GFR 30-59 ml/min ([address] Chronic obstructive pulmonary disease, unspecified COPD type ([address] REJI (obstructive sleep apnea); Diabetes mellitus type 2, diet-controlled ([address] ICD (implantable cardioverter-defibrillator) in place; S/P AVR (aortic valve replacement); Obesity, morbid ([address] Coagulopathy ([address] Traumatic hematoma of left knee, sequela; Iron deficiency anemia due to chronic blood loss; Osteopenia, unspecified location; Mixed hyperlipidemia; Osteoarthritis of shoulder, unspecified laterality, unspecified osteoarthritis type; Cardiomyopathy, unspecified type ([address] Pulmonary hypertension ([address] Dysthymia; Long term (current) use of anticoagulants; Subdural hematoma ([address] Closed nondisplaced fracture of shaft of fifth metacarpal bone of left hand, sequela; Physical debility; Encounter for immunization  
  
sertraline (ZOLOFT) 50 mg tablet Take 1 Tab by mouth daily. Qty: 30 Tab, Refills: 4 Associated Diagnoses: Anxiety  
  
fluticasone-vilanterol (BREO ELLIPTA) 200-25 mcg/dose inhaler Take 1 Puff by inhalation daily. Qty: 1 Inhaler, Refills: 11  
  
aspirin delayed-release 81 mg tablet Take 81 mg by mouth daily. docusate sodium (COLACE) 50 mg capsule Take 50 mg by mouth daily. ferrous gluconate 324 mg (38 mg iron) tablet TAKE 1 TAB BY MOUTH DAILY (BEFORE BREAKFAST). INDICATIONS: IRON DEFICIENCY ANEMIA Qty: 90 Tab, Refills: 3 Associated Diagnoses: Iron deficiency anemia due to chronic blood loss  
  
fluticasone (FLONASE) 50 mcg/actuation nasal spray 2 Sprays by Both Nostrils route daily as needed. Qty: 3 Bottle, Refills: 3  
  
albuterol (VENTOLIN HFA) 90 mcg/actuation inhaler 2 puffs qid prn 
Qty: 1 Inhaler, Refills: 11  
 Associated Diagnoses: Pulmonary hypertension ([address] Chronic respiratory failure with hypoxia (HCC) OXYGEN-AIR DELIVERY SYSTEMS 3 L by Nasal route continuous. acetaminophen (TYLENOL) 325 mg tablet Take 650 mg by mouth every six (6) hours as needed for Pain. STOP taking these medications  
  
 atorvastatin (LIPITOR) 10 mg tablet Comments:  
Reason for Stopping:   
   
 albuterol (PROVENTIL VENTOLIN) 2.5 mg /3 mL (0.083 %) nebulizer solution Comments:  
Reason for Stopping:   
   
 simvastatin (ZOCOR) 20 mg tablet Comments:  
Reason for Stopping:   
   
  
 
 
Activity: PT/OT Eval and Treat Diet: Cardiac Diet Wound Care: None needed DNR Surrogate decision maker:  Goivana Bullion, daughter Pneumonia and flu vaccine to be administered at discharge per hospital protocol Follow-up ·   FU PCP 3 days ·   Will need INR closely followed outpatient, check tomorrow ·   Palliative Care to follow outpatient Notes, labs, VS, diagnostic testing reviewed Case discussed with pt, care team, Dr. Dhara Pritchett Time spent to discharge patient  45 min Signed: 
Juice Larry NP 
8/2/2019 
8:58 AM

## 2019-08-02 NOTE — PROGRESS NOTES
Dispo update: 1. Resumption of care order placed for [address] OT, PT, SLP, and RN (including PT/INR August 3, 2019). 2.  Palliative care (home services) consult called to [facility] (phone [phone]), and faxed 30 pages to their [facility] office at fax [phone]. 
 
3.  Patient/family decline offer for [facility] ambulance transport.

## 2019-08-05 NOTE — PROGRESS NOTES
Transition of Care Discharge Follow-up Questionnaire Date/Time of Call: 
 8/5/19 What was the patient hospitalized for? CHF, Respiratory Failure Does the patient understand his/her diagnosis and/or treatment and what happened during the hospitalization? States understanding Did the patient receive discharge instructions? Yes  
CM Assessed Risk for Readmission:  
 
 
Patient stated Risk for Readmission:  
 
 High, related to PMH of CAD, CHF, CKD 4, HTN, COPD, Not asked Review any discharge instructions (see discharge instructions/AVS in [facility]). Ask patient if they understand these. Do they have any questions? Cardiac, low salt diet PT/OT Were home services ordered (nursing, PT, OT, ST, etc.)? Resume [facility]. Health  also to follow for CHF. Palliative care also to follow If so, has the first visit occurred? If not, why? (Assist with coordination of services if necessary.) Patient refused first visit RN. PT/ OT to follow, present in home at time of call. Has not heard from Palliative care yet. Was any DME ordered? None If so, has it been received? If not, why?  (Assist patient in obtaining DME orders &/or equipment if necessary.) N/A Complete a review of all medications (new, continued and discontinued meds per the D/C instructions and medication tab in [address] [facility]). Start: tramadol 50mg q 6 hr as needed pain Lidocaine patch to right shoulden Retacrit injections: T, TH, S Stop: Lipitor, Zocor, Ventolin nebulized Changed: none Continue: all other home medications Were all new prescriptions filled? If not, why?  (Assist patient in obtaining medications if necessary  escalate for CCM &/or SW if ongoing issues are verbalized by pt or anticipated) Daughter has and having filled. Does the patient understand the purpose and dosing instructions for all medications? (If patient has questions, provide explanation and education.) She states understanding. Does the patient have any problems in performing ADLs? (If patient is unable to perform ADLs  what is the limiting factor(s)? Do they have a support system that can assist? If no support system is present, discuss possible assistance that they may be able to obtain. Escalate for CCM/SW if ongoing issues are verbalized by pt or anticipated) Needs assist in home. Has [facility] aide 6 days week. Does the patient have all follow-up appointments scheduled? 7 day f/up with PCP?  
(f/up with PCP may be w/in 14 days if patient has a f/up with their specialist w/in 7 days) 7-14 day f/up with specialist?  
(or per discharge instructions) If f/up has not been made  what actions has the care coordinator made to accomplish this? Has transportation been arranged? Will not attend further appointments; plans to have [address] [employer] NP follow. Any other questions or concerns expressed by the patient? She believes all needs are met. No concerns voiced when asked. Schedule next appointment with VENECIA WOODWARD Coordinator or refer to RN Case Manager/ per the workflow guidelines. When is care coordinators next follow-up call scheduled? If referred for CCM  what RN care manager was the referral assigned? Follow-up call within 15 days.   
YASMINE Call Completed By: Grant Coats RN

## 2019-08-05 NOTE — Clinical Note
SN greeted at door by pt/cg. Pat. ambulating without any problems answered front door after SN knocked and waited for 10minutes. Patient was ambulating without her Rolator. patient sat on cafe chair due to weakness in both legs and feet. SN called patient s daughter to give report on situation. Patient pressed her med alert and fire fighters came and helped patient to [address] [facility]. Alert and oriented x4. Respirations even and unlabored. No distress noted. All education done Pt/cg educated to call physician for any questions or concerns, Pt/cg educated to call 911 for all emergencies.

## 2019-08-07 PROBLEM — J96.21 ACUTE ON CHRONIC RESPIRATORY FAILURE WITH HYPOXIA (HCC): Status: ACTIVE | Noted: 2019-01-01

## 2019-08-07 NOTE — PROGRESS NOTES
Initial visit to assess pt's spiritual needs. Pt   Sleeping, cared for family member, bedside, with presence & prayer; left card Left a card.  
 
 
Chaplain Antoni Betancourt, Miriam,[facility],PhD

## 2019-08-07 NOTE — PROGRESS NOTES
Patient received to ICU and placed on vent. Patient is able to communicate needs and is following commands. Dual skin assessment performed with [facility]. Patient's Sacrum is intact; Allevyn placed for wound prevention. Patient has a surgical scar to mid-chest, mid-abdomen, and L chest where PM is located. Bilateral knees are darkened and patient has a healing wound to L knee that a Mepilex was placed on. Patient has a scar/darkening to under R arm as well. Patient c/o R arm pain, MD aware.

## 2019-08-07 NOTE — PROGRESS NOTES
Checked in on patient who is awake and alert, though having gagging due to nasal tube. She is now tolerating PSV 12/4. Will monitor closely and wean with attempt to extubate.  
Ramón Weathers MD

## 2019-08-07 NOTE — ED NOTES
TRANSFER - OUT REPORT: 
 
Verbal report given to Alexandru Ansari RN on  Company  being transferred to [facility] [phone] for routine progression of care Report consisted of patients Situation, Background, Assessment and  
Recommendations(SBAR). Information from the following report(s) SBAR was reviewed with the receiving nurse. Lines:  
Peripheral IV 08/07/19 Left Antecubital (Active) Peripheral IV 08/07/19 Right Antecubital (Active) Opportunity for questions and clarification was provided. Patient transported with: 
 Monitor O2 @ 15 liters Registered Nurse

## 2019-08-07 NOTE — PROGRESS NOTES
Bedside shift report given to [facility]. Patient is resting comfortably on vent with daughter at bedside.

## 2019-08-07 NOTE — PROGRESS NOTES
Guideline Guideline Number: [id number] Title: Management of the Patient with Mechanical Ventilation (including weaning) and ABCDE Bundle Effective Date:  03/00 Revised Date: 02/09, 03/10, 7/12, 5/13,                                  10/13, 8/14 Reviewed Date: 07/2015, 04/2016, 06/2017 I. Policy:  Management of the patient requiring mechanical ventilation, including readiness to wean and weaning protocol. The information provided serves as a guideline for patient management. Included in this guidelines is the ABCDE Bundle to provide guidance to staff for evidence based management of pain, agitation/anxiety and delirium in the intensive care unit. The goals of critical care analgesia and sedation are to facilitate mechanical ventilation, to prevent patient and caregiver injury, and to avoid the psychological and physiologic consequences of inadequate treatment of pain, anxiety, agitation, and delirium by maintaining a light level of sedation. Pain occurs commonly in adult ICU patients, regardless of admitting diagnosis. Therefore, pain should be frequently assessed and analgesic medications titrated to prevent adverse effects associated with either inadequate or excessive analgesia. Once pain has been addressed, anxiolytic and/or antipsychotic medications can be utilized to treat unresolved agitation/anxiety and delirium, with the goal to prevent over- or under sedation by using the Manrique Agitation Sedation Scale (RASS). Assertive management of these issues has been shown to reduce costs, improve ICU outcomes such as successful extubation and ICU length of stay, and allow for patients to participate in their own care. II. Purpose: The respiratory care practitioner and the critical care RN will utilize the following guideline to provide the most efficient and effective management of mechanical ventilators and weaning and extubation processes. Goals of Treatment: 1. Adequate management of patients pain and discomfort while maintaining a light level of                   sedation (RASS score of 0 to -1) 2. Both chronic and acute sources of pain should be identified and treated. 3. Sedative agents should be considered if patient still expresses discomfort and/or is not at RASS         goal of 0 to -1 despite adequate management of pain. 4. Patients requiring neuromuscular blockage must have continuous infusions of analgesic and              sedative agents. III. Responsibility: Director Respiratory Care Services and all Respiratory Care Practitioners  with documented competency as well as Critical Care RN staff. General Guidelines 1. Introduction to Ventilator Plan Phase 
a. Ventilator Management Phase, General Statement -  The plan should be initiated in patients who have a secure airway/require invasive mechanical ventilation (endotracheal or tracheostomy) only -   The provider determines the appropriate medications used for analgesia and agitation/anxiety along with the clinical pharmacist 
 
2. Monitoring Levels of Comfort 
a. Pain Assessment 
- Pain is monitored using the numerical scales - A pain assessment should be conducted, at a minimum, every 4 hours and as needed and per guidelines. - The level of pain should be determined as satisfactory by the patient based on patients baseline level of pain , considering any chronic pain that the patient may have. - If the patient is unable to communicate pain level, the nurse can assess for nonverbal indicators including facial grimacing, moaning, tachypnea, tachycardia, hypertension, diaphoresis, etc as a cue to begin further pain assessment. b.  Sedation Assessment - Sedation is monitored using the Manrique Agitation Sedation Scale (RASS) Target RASS RASS Description +4 Combative, violent, danger to staff 
  
+3 Pulls or removes tubes(s) or catheters; aggressive +2 Frequent nonpurposeful movement, fights ventilator +1 Anxious, apprehensive, but not aggressive  
0 Alert and calm  
-1 Awakens to voice (eye opening/contact) > 10 sec  
-2 Light sedation, briefly awakens to voice (eye opening/contact) < 10 sec  
-3 Moderate sedation, movement or eye opening. No eye contact  
-4 Deep sedation, no response to voice, but movement or eye opening to physical stimulation  
-5 Unarousable, no response to voice or physical stimulation  
 
-  Goal RASS is 0 to -1, unless otherwise specified by providers order. 
- Nursing staff should conduct the RASS every 4 hours and as needed to maintain goal RASS of 0 to -1. 
- If RASS is outside of goal range, discuss treatment options with provider. 
- A RASS score of +2 to +4 requires further assessment by the nurse. Causes of agitation/anxiety that should be considered include: 
a. Pulmonary -   endotracheal tube malposition or patency, mode of ventilation, pneumothorax, hypoxemia, hypercarbia 
b. Metabolic  hypoglycemia, hyponatremia, acute renal or hepatic failure 
c. Emotional upset  with information and awareness of critical condition, prognosis, need for surgical or invasive procedures, other interventions or complications, family or personal stressors 
- C. Sedation Assessment while using Neuromusclar Blocking Agents 
- D. Delirium Assessment 
a. the ICU ([facility]  ICU) 3. Analgesia The incidence of significant pain has been reported to be 50% or higher in both medical and surgical ICU patients. These patients also experience discomfort during routine/procedural ICU care and at rest.  However, patients may be unable to self-report their pain (either verbally or with other signs) because of an altered level of consciousness, the use of mechanical ventilation, or high doses of sedative agents or neuromuscular blocking agents.  
The short and long term consequences of unrelieved or inadequately treated pain are significant and include patient discomfort, decreased satisfaction with care by family and patient, delirium, agitation/anxiety, post traumatic stress disorder and depression. Therefore, routine assessment and treatment of pain should occur in all ICU patients. Causes and Treatment of Pain in the ICU 
a. Acute pain (post-operative, procedural pain, discomfort with usual ICU care or other acute episodes of pain-related to underlying disease) 1. Consider use of PCA for alert and oriented patients with pain needs not met by PRN dosing or opoids. 2. Preemptive analgesia should occur prior to chest tube removal, and should be considered for other procedural pain such as turning and repositioning, would drain removal, wound dressing change, tracheal suctioning, femoral catheter removal or place of central venous catheter. 3. Appropriate analgesic medications for preemptive analgesia are short acting intravenous (IV) agents (i.e. fentanyl, morphine, hydromorphone) a. Administration of analgesia before patient experiences noxious stimuli prevents amplification and hyperexcitability of the central nervous system. b. Analgesia for Mechanically Ventilated Patients: 
1. The approach to sedation and analgesia management for mechanically ventilated patients favors use of analgesia first sedation. The primary goal of this strategy is to address pain and discomfort first, and then if necessary, add anxiolytic agent. 2. Analgesia first sedation reduces dose escalation of medications, decreases the duration of mechanical ventilation and the incidence of VAP, improves the probability of successful extubation, and ultimately shortens ICU length of stay. 3. For pain management, analgesic medications are determined by the provider.    Intermittent dosing of the analgesic should be attempted first.   
If the patient requires more than 3 doses within 1 hour then provider should be contacted to consider continuous infusion. 4.  Analgesic options for mechanically ventilated patients include: 
a. Fentanyl which is considered the drug of choice for patients requiring continuous infusion. b.Morphine may be considered for those patients without renal dysfunction who are hemodynamically stable and require intermittent pain medication. Continuous infusions of morphine may be used for patientl who are receiving comfort care as part of end of life care. c.Hydromorphone is reserved for patients who are refractory to fentanyl or morphine and is typically admininstered by intermittent dosing. 4.  Agitation/Anxiety Background Agitation and anxiety frequently occur in ICU patients. Anxiolytic/sedation agents may be indicated to help relieve discomfort, improve synchrony with mechanical ventilation, and decrease the overall work of breathing. Pain control alone may be sufficient to make patients comfortable enough to require no anxiolytic/sedative agent. In addition, non-pharmacologic interventions such as repositioning or verbal assurance may be helpful to comfort or redirect an agitated patient. If these methods are unsuccessful, then anxiolytic/sedative medications such as propofol, dexmedetomidine, or benzodiazepines can be used. Selection of an anxiolytic should be based on the pharmacokinetic properties of the medication, patient specific characteristics, and sedation goal.   However, nonbenzodiazepine sedatives (ie propofol or dexmedetomidine) may be preferable over benzodiazepines (ie midazolam or larazepam) due to more favorable outcomes such as delirum. Causes and Treatment of Agitation/Anxiety 
a. Possible underlying causes of agitation and anxiety include pain, delirium, hypoxemia, hypoglycemia, hypotension, or withdrawal from alcohol  and other drugs.  
b. Analgesia first sedation should be attempted initially to manage pain and provide sedation in appropriate patients. Analgesia alone may be adequate to reach RASS goal of 0 to -1. If patient remains agitated or anxious despite adequate analgesia (ie RASS +2 to +4) then anxiolytic/sedative should be considered. c. The choice of anxiolytic should be based on desired levels of sedation (ie light sedation or deep sedation) with preference for the use of nonbenzodiazepines such as propofol or dexmedetomidine if appropriate. While light sedation (ie RASS 0 to -1) is preferred for most patients, there are instances when deep sedation (ie RASS -4 to -5) is desired. For example, in the setting of ventilator dysynchrony due to ARDS or for patients receiving NMB agents. d. Medications to maintain light sedation (ie RASS 0 to -1) include 1. Propofol continuous infusion can be considered for hemodynamically stable (ie SBP = 100, MAP = 65 and/or not requiring vasopressor support) patients requiring light sedation. Propofol has a quick onset (1-2 minutes) and offset of action, making it a good agent to assess neurological status and facilitate liberation from the mechanical ventilator. 2.Dexmedetomidine continuous infusion is a good option for hemodynamically stable patients requiring light sedation as it allows for a more awake, interactive patient is associated with less delirium. It has an intermediate onset of action (5-10 min). Therefore, abrupt titrations should be avoided, but use of prn haloperidol or benzodiazepine may be useful to manage agitation until the medication takes effect. 3. Antipsychotics are another option. In particular, haloperidol intermittently dosed may be useful for patients with symptoms of agitation/anxiety and delirium. 4.Benzodiazapines can also be considered for light sedation, but should be intermittently doses. Midazolam is an option for patients without renal dysfunction.   It has a short onset of action (2-5 minutes) making it a good agent for acute agitation/anxiety, but short duration of action resulting in frequent dosing. Lorazepam is another option. It has a longer onset of action (15-20 minutes) in comparison to midazolam, but longer duration of action. e. Medications to maintain deep sedation (RASS -4 to -5) include: 
1) Propofol continuous infusion should be considered as a first line option for hemodynamically stable patients. 2)Benzodiazepines can be considered as second line options for deep sedation. Studies comparing these agents to other sedatives have shown that they lead to worse outcomes including delirium, oversedation, delayed extubation, and longer time to discharge. Midazolam is one option for patients without renal dysfunction and lorazepam is another options. If patient requires more than 3 doses within 1 hour then contact provider  to consider initiation of continuous infusion. 5.  Daily Sedation Awakening Trial (SAT) from IV Continuous Analgesia/Sedation 
a. Patients are to have daily awakening from sedation while on continuous IV analgesia and/or sedation in the ICU. Continuous analgesia infusions may be maintained only if needed for active pain and RASS is at goal 0 - -1. Unit guideline is for the SAT to occur following ICP rounds each morning.   
b. The sedation awakening trial (SAT) is done regardless if the patient meets criteria for spontaneous breathing trial (SBT). c. SAT safety screen is assessed and SAT should not be performed if sedation is being used for active seizures, alcohol withdrawal, hemodynamically unstable or requiring support of vasoactive medications , in conjunction with NMB agents, if ICP is greater than 
20mmHg or if sedation is being used to control ICP, patients RASS is +3 or +4 (very agitated or combative).   Other exclusion criteria are:  if there is documentation of myocardial ischemia in the past 24 hours; or patient is receiving high frequency oscillator ventilation (HFOV) , if the patient has an open chest /abdomen or is receiving comfort care. d. Criteria for passing the SAT are the patient opened their eyes to verbal stimuli or tolerated sedative interruption without exhibiting failure criteria. 
e. Patients fail the SAT if the develop sustained anxiety, agitation, or pain; a respiratory rate of 35 per minutes for 5 minutes or longer, an SpO2 less than 88% for 5 minutes or longer; an acute cardiac dysrhythmia; two or more signs of respiratory distress including tachycardia, bradycardia; use of accessory muscles; diaphoresis; marked dyspnea; or myocardial ischemia. f. Respiratory therapy staff must verify with the nurse that continuous IV analgesia (unless being use  for active pain) and sedation (unless patient is receiving dexmedetomidine) is off prior to placing patient on SBT. g. DO NOT interrupt infusion of analgesia and sedation medications if patient is receiving neuromuscular blockade. 
h. Monitor level of wakefulness unless patient is awake and follows commands (RASS 0 to -1) or patient becomes uncomfortable or agitated (RASS +3 to +4) 
i. If agitation prevents successful awakening , administer bolus of analgesia and/or sedation then resume infusion of the medication at ½ previous dose and titrate as needed. 
j. If oversedation prevents successful awakening, hole infusion until at goal and resume ½ of prior infusion rate/dose if clinically indicated. 6. Delirium Background Delirium is characterized by the acute onset of cerebral dysfunction with a change or fluctuation baseline 
mental status, inattention, and either disorganized thinking or an altered level of consciousness. It affects up to 80% of mechanically ventilated adult ICU patients, and is associated with increased mortality,  
and treatment is important and may in turn allow for a patient to be conscious yet cooperative enough to participate in ventilator weaning trials and early mobilization efforts. Delirium can only be assessed in patients who are able to sufficiently interact and communicate with bedside 
clinicians (ie RASS -3 to +4). IV. Procedure: A. Assessment: The following criteria must be assessed prior to the initiation of weaning from mechanical ventilation. Note: The criteria are general guidelines and must be individualized for each patient. The patients primary nurse will be responsible, in coordination with the RT, the Spontaneous Awakening Trial). The RT will perform the SBT. B. Spontaneous Awakening Trials (SATs  also referred to as Sedation Vacation) and Spontaneous Breathing Trials (SBTs) performed to determine readiness to wean. 1. For patients who meet established criteria, such as those without active seizures, alcohol withdrawal and agitation, myocardial ischemia or those requiring cardiac support devices, without increased intracranial pressure and those not receiving neuromuscular blockade, the nurse will reduce the infusions of sedative by 50% of current used for sedation that was used to achieve a level of light sedation (Gutierrez Score 2 or RASS score of 0 to -1) and evaluate patient response to reduction of sedation. Analgesics required for pain control are continued during the test.  Obtain MD order to cover no SAT for that time period if patient has any exclusion criteria as described above. 2. Failure of the spontaneous awakening trial occurs when the patient shows symptoms such as increased agitation, anxiety, pain or signs of respiratory distress including respiratory rate >35/min or oxygen saturation <88% as well as development of acute cardiac arrhythmias. If these symptoms develop during the SAT, the nurse then restarts sedation at 75% of the previous dose and titrates the medications until the patient is comfortable and/or symptoms have abated. 3.  If the patient passes the SAT then the patient moves on to the Spontaneous Breathing Trials as performed by the RT. The SBT Safety Screen included the following:  No agitation, oxygen saturation > 88%, FIO2 < 50%, PEEP < 7.5 cm H20, no myocardial ischemia, no vasopressor use, and with inspiratory efforts. 4. Patients who pass the spontaneous awakening trial but fail the spontaneous breathing trial are placed back on full ventilator support and reassessed the next day. 5. Failure of the SBT (spontaneous breathing trail) includes any of the following:  Respiratory rate > 35/min, respiratory rate < 8/min, oxygen saturation < 88%, respiratory distress, mental status change, acute cardiac arrhythmia. 6. Extubation is considered for patients who tolerate the spontaneous awakening trial and pass the spontaneous breathing trial.   
C. Can the cause of respiratory failure be reversed (i.e. absence of high spinal cord injury or advanced ALS)? D. Is gas exchange adequate? 1. PaO2/FIO2 ratio > 150  200, 2. PEEP < 8 cm H20 
3. FIO2 < 50 
4. pH > 7.30 
5. Rapid shallow breathing index (f/VT) < 105 
E. Is patient hemodynamically stable? 1. Absence of clinically significant hypotension (minimal vasopressors such as Dopamine < 5mcg/kg/minute)? F. Is there evidence of intact respiratory drive (NIP/NIF >-09 NMC26, stable VC02)? G. Does patient have an adequate cough, airway clearance ability? H. Is there absence of excessive secretions? V. Initiation: A. The therapist shall consult with RN to determine if sedation can be discontinued or significantly decreased. If this can be achieved, the therapist shall implement the  
                  followin. Identify patient and verify name and account number via ID bracelet. 2. Perform hand hygiene per hospital policy utilizing Standard Precautions for all patients and following transmission-based isolation as indicated per hospital policy. 3. Perform a ONE-MINUTE SPONTANEOUS TRIAL AND ASSESSMENT. 4. Measure Rapid Shallow Breathing Index (RSBI) and monitor SpO2 and cardiovascular parameters during the spontaneous breathing assessment. 5. If SpO2 and cardiovascular parameters are stable, continue spontaneous breathing trial for at least 30 minutes and up to 120 minutes, as patient tolerates. 6. Monitor ventilatory status, SpO2, and cardiovascular status during spontaneous breathing trial. 
7. If patient has a successful trial, consider patient as a candidate for extubation and obtain order. 8. If patient fails the weaning trial, place back on ventilator and adjust settings to provide a non-fatiguing form of ventilatory support for the remainder of the day and night. 9. One attempt at weaning shall be performed each day until successful weaning occurs. The RCP will make every attempt to begin the spontaneous breathing trials between 0500 and 0600 to provide documentation of the trial when the pulmonologist makes rounds. B.  Assessment of SBT or PST: 
1. Is gas exchange acceptable? 2. PaO2 > 60 mm Hg. 3. PH > 7.30 
4. Increase in PaCO2  < 10 mm Hg C. Is patient hemodynamically stable? 1. HR < 120 beats/minute 2. HR  < 20% 3. Systolic BP < 472 and > 90 mmHg 4. BP  < 20%, no vasopressors required D. Does patient have stable ventilatory pattern? 1. Sustained RR < 30 breaths per minute 2. Normal and stable VCO2 3. Patient is not demonstrating any signs of increased work of breathing, such as increased use of accessory muscles. E. Mental status stable throughout trial? 
1. Absence of changes such as somnolence, excessive agitation or anxiety 2. Absence of diaphoresis during trial? 
IV. Safety: A. The RCP shall monitor patient according to the above guidelines.  If at any time during the weaning process, the respiratory therapist or nurse feels that the patient is not tolerating weaning, the therapist shall place patient back on previous ventilator settings. B. The patient shall be reassessed and the weaning process should be continued the following day. V. Reportable Conditions: A. The therapist shall notify the physician, as appropriate, for any of the following         conditions: 1. FIO2 increase (sustained) at 10% or greater 2. Poor patient/ventilator interface in spite of adjustments 3. Need for increased sedation for respiratory distress 4. Need for increasing ventilating pressures (i.e. PEEP, PIP, MAP) 5. ABG results meeting panic value criteria or other clinical signs indicating deterioration of patients condition. 6. Unplanned extubation. 7. Unexplained sustained increase in PIP greater than 10 cm H2O. 
8. Assessment results regarding ventilator discontinuance process. VI. Ventilator protocol management A. The following items should be maintained for patients who are being mechanically           ventilated. 1. Obtain STAT Chest X-Ray for ET tube placement after insertion. 2. Chest X-Ray q a.m. while on ventilator. 3. ABGs 30 - 60 minutes after being stable on the ventilator. 4. ABG's q a.m. while on ventilator and prn. 
5. Do spontaneous breathing trials when patient is hemodynamically stable, responsive, and without fever. 6. Terminate trials if patient exhibits signs of respiratory distress. 7. Therapists should maintain ABG s as follows: 
    a. pH -  7.30 - 7.50              
    b. PaO2 -   60  100  
     8. Racemic Epinephrine (0.5cc) for post extubation stridor (2 UA treatments max.) 
 
VII. Early Mobilization Mobility Level Criteria Start at Level 1 if:  
PaO2/FIO2 <250 Positive end-expiratory Pressure (PEEP) >=10 cm H2O  
O2 saturation <90% Respiratory Rate (RR) Not within 10-30 per min Cardiac arrhythmias or ischemia New onset Heart Rate  (HR) <60 or >120 beats per min Mean arterial pressure (MAP) <55 or >140 mmHg Systolic blood pressure (SBP) <90 or > 180 mmHg Vasopressor infusion New or increasing Manrique Agitation Scale (RASS) < - 3 Level I:   Breathe  (Rass -5 to -3) HOB Angle  improve VAP protocol compliance ? Visually confirm the [facility] is elevated >= 30 degrees to comply with VAP prevention protocols ? The Centers for Disease Control and Prevention recommends an HOB angle of 30-45 degrees , unless contraindicated Additional activities to be implemented ? Every 2 hour turning ? Passive range of motion ? Up to 20 degrees reverse trendelenburg with lower extremity exercise/retracting footboard ? Continuous lateral rotation therapy can be considered part of early mobility therapy in patients who are at high risk for pulmonary complications Move to Level 2 when the patient 
- Has acceptable oxygenation/hemodynamics - Tolerates q 2 turning - Tolerates HOB > 30 degrees or up to 20 degrees reverse trendelenburg Level 2 :Tilt  Patient Assessment Rass > -3  (eg, opens eyes, may have profound weakness) Up to 20 degrees Reverse Trendelenburg position and 10 degrees minimum HOB 
- Reverse Trendelenburg positioning allows for orthostatic position in fragile patients - If available , use in conjunction with retracting foot section to allow for partial weight bearing prior to sitting up in the bed or getting out of bed Additional activities to be implemented -  Maintain HOB >/= 30 degrees - Q 2 hour turning - Passive/active range of motion - Legs dependent - PT consultation Move to Level 3 when the patient . Carlee Leon -Tolerates active- assistance exercises 2 times per day 
  -Tolerates lower extremity exercises against footboard/Up to 20 degrees Reverse Trendelenburg 
  -Tolerates legs dependent /HOB 45 degrees Level 3 :  SIT  (Rass >- 1 (eg , weak but may move arms/legs independently) Full chair position (footboard on) ?  Full upright positioning allows for diaphragmatic excursion and lung expansion ? Sitting with legs in a dependent position facilitates gas exchange Additional activities to be implemented - Maintain HOB >= 30 degrees - Q 2 hour turning (assisted) - Active range of motion  PT/OT actively involved - Encourage activities of daily living 
- Dangling, if patient can move arm against gravity Move to Level 4 when the patient . Giovanni Stokes - Tolerates increasing active exercise in bed - Actively assists with every- 2- hour turning or turns independently - Tolerates full chair position 3 times/day Level 4:  Stand ( RASS >0 (eg, weak but may tolerate increased activity) Stand Attempts ? Full chair position (footboard off/feet on the floor) ? Consider using a sit-to-stand lift ? Pivot to chair, it tolerates partial weight bearing Additional activities to be implemented - Maintain head of bed >= 30 degrees - Q 2 hr turning (self/assisted) - Active range of motion - Encourage activities of daily living 
- PT/OT actively involved Move to Level 5 when the patient . 
- Can successfully comply with all activities - Tolerates trial periods of full chair position (footboard off/feet on floor) 3 times per day - Tolerates partial weight-bearing stand and pivots to chair Level 5 :  Move  (RASS > 0    (eg, weak but may tolerate increased activity) Achieve out of bed ? Utilize mobile floor life to ambulate to bedside chair Additional activities to be implemented - Maintain HOB > = 30 degrees - Q 2 hour turning (self/assisted) - Active range of motion - Patient stands/bears weight > 1 minute - Patient marches in place 
- PT/OT actively involved Patient continues to ambulate progressively longer distances as tolerated until they consistently participate and move independently. E Approved by Critical Care Committee 2-19-09 N [facility] Clinical Guidelines ABCDE DOC Flowsheet Content Variables to select when addressing section Comments ABCDE Initiated ? Yes/No  RN to address minimum q 24 hours (day shift) Target RASS ? 0 = alert and oriented ? -1 = drowsy ? -2 = light sedation ? -3= moderate sedation ? -4= deep sedation Target on standard ventilator setting should be -2; -4 with oscillator CAM -ICU ? Positive ? Negative ? Unable to assess Delirium assessment SAT Safety Screen Passed ? Yes 
? No Select yes if proceeding on to the sedation vacation (reduction of continuous sedative drip by directed by MD) Select no if your patient has any of the below reasons for not proceeding on to the daily awakening sedation vacation trial  
SAT Screen for Failure ? Active seizures ? Acute delirium tremors ? Agitation that threatens accidental line/tube removal 
? On paralytics ? MI (24-48hr) ? Abnormal ICP ? Open abdomen Select one of the options when the patient will not undergo the sedation vacation  ALSO MUST OBTAIN AN ORDER FOR gwen Oliver written under nursing miscellaneous for now by either the NP or Intensivist  
Daily sedation Vacation/assessment of  ? Yes 
? No 
? Not applicable If yes, MUST see the reduction in sedation on the [facility] and please place in the comment section of the sedative sedation vacation started SBT Safety Screen Passed ? Yes 
? No Select Yes if the patient has none of the below listed reasons for not proceeding on to the SBT following reduction of sedation SBT Screen Reason for Failure ? Agitation ? O2 Sat < or = 88% 
? FIO2 > 50% ? PEEP >7 
? MI 
? Vasopressor Use 
? Bilevel setting on Vent ? Oscillator in use ? Increased resp effort Select reason as appropriate for NOT proceeding on to the SBT Wake Up and Breathe Protocol

## 2019-08-07 NOTE — ED PROVIDER NOTES
HPI: 
68 female brought in by EMS with respiratory distress. EMS were called out due to difficulty breathing over the past couple days. Has known history of COPD and CHF. Was reported oxygenation of 50% in appearance of severe respiratory distress with tachypnea. Patient was requesting help with intubation since she has had this done many times in the past.  They did not feel she can tolerate a CPAP. They were able to successfully intubated nasally. Heart rate initially 180/130 on arrival which improved to 140/100 after nasal intubation. Her respiratory efforts also improve after albuterol. No steroid was given. Per family member at bedside at this time no recent pneumonia symptoms. No fever. Was recently discharged and was doing okay. She was recently in the hospital for acute CHF exacerbation. Has been compliant with all medications per family members ROS Constitutional: No fever, no chills Skin: no rash Eye: No vision changes ENMT: No sore throat Respiratory: + shortness of breath, No cough Cardiovascular: No chest pain, no palpitations Gastrointestinal: No vomiting, no nausea, no diarrhea, no abdominal pain : No dysuria MSK: No back pain, no muscle pain, no joint pain Neuro: No headache, no change in mental status, no numbness, no tingling, no weakness Psych:  
Endocrine:  
All other review of systems positive per history of present illness and the above otherwise negative or noncontributory. Visit Vitals /63 Pulse 77 Temp 100.1 °F (37.8 °C) Resp 18 Ht 5' 2\" (1.575 m) Wt 93.4 kg (206 lb) SpO2 100% BMI 37.68 kg/m² Past Medical History:  
Diagnosis Date  Anticoagulated on Coumadin 7/9/2013 S/P AVR  CAD (coronary artery disease) 1/20/2013 5/8/14 PCI LAD with stent placed  Cardiomyopathy ([facility] [address]  CHF (congestive heart failure) ([facility] [address] 2/17/2017  CKD (chronic kidney disease) stage 3, GFR 30-59 ml/min ([facility]) 7/10/2013  COPD (chronic obstructive pulmonary disease) ([facility] [address] 2014  Diabetes ([facility] [address]  Essential hypertension, benign 2013  HLD (hyperlipidemia)  ICD (implantable cardioverter-defibrillator) in place 10/2/2014 Biotronik single-chamber ICD implantation 10/20/14  Iron deficiency anemia due to chronic blood loss 2009  MDS (myelodysplastic syndrome) ([facility] [address] 2011 Procrit started in 11 Procrit weekly and Iron stores. 12 good response to 3 weekly procrit did not need it last time  3-7-13 Pt doing well. Just wanted a \"check-up. \" Responding to Procrit every three weeks. 13 patient has missed some injections on recently restarted hemoglobin only issue , takes oral iron iron stores each time  REJI (obstructive sleep apnea)-cpap 2014  Osteoarthritis  Osteopenia  Pulmonary hypertension ([facility] [address] 6/15/2016  Quadrantanopia  Skin defect 2018  Visual complaint 2015 Past Surgical History:  
Procedure Laterality Date [address]    COLONOSCOPY N/A 2019 COLONOSCOPY/ BMI 40 ROOM 637 performed by Devorah Benitez MD at [address] [place] [address], [facility]  
 mechanical valve   HX  SECTION    
 HX CORONARY STENT PLACEMENT  May, 2014 STEMI with Stent placement.  HX ENDOSCOPY  2009 EGD [address] CATHETERIZATION    HX PACEMAKER  10/2/2014 Biotronik ICD  HX TUBAL LIGATION    
 IR BX BONE MARROW DIAGNOSTIC  2011 Prior to Admission Medications Prescriptions Last Dose Informant Patient Reported? Taking? OXYGEN-AIR DELIVERY SYSTEMS   Yes No  
Sig: 3 L by Nasal route continuous. acetaminophen (TYLENOL) 325 mg tablet   Yes No  
Sig: Take 650 mg by mouth every six (6) hours as needed for Pain. albuterol (VENTOLIN HFA) 90 mcg/actuation inhaler   No No  
Si puffs qid prn Patient taking differently: Take 2 Puffs by inhalation every six (6) hours as needed for Wheezing or Shortness of Breath. aspirin delayed-release 81 mg tablet   Yes No  
Sig: Take 81 mg by mouth daily. carvedilol (COREG) 6.25 mg tablet   No No  
Sig: Take 1 Tab by mouth two (2) times daily (with meals). docusate sodium (COLACE) 50 mg capsule   Yes No  
Sig: Take 50 mg by mouth daily. epoetin jairo-epbx (RETACRIT) 10,000 unit/mL injection   No No  
Si mL by SubCUTAneous route Every Tues, Thur & Sat. Indications: anemia due to kidney failure Patient taking differently: 2 mL by SubCUTAneous route Every Tues, Thur & Sat. will be given at MD office  Indications: anemia due to kidney failure  
ferrous gluconate 324 mg (38 mg iron) tablet   No No  
Sig: TAKE 1 TAB BY MOUTH DAILY (BEFORE BREAKFAST). INDICATIONS: IRON DEFICIENCY ANEMIA Patient taking differently: Take 325 mg by mouth two (2) times daily (with meals). fluticasone (FLONASE) 50 mcg/actuation nasal spray   No No  
Si Sprays by Both Nostrils route daily as needed. Patient taking differently: 2 Sprays by Both Nostrils route daily as needed for Allergies. fluticasone-vilanterol (BREO ELLIPTA) 200-25 mcg/dose inhaler   No No  
Sig: Take 1 Puff by inhalation daily. furosemide (LASIX) 40 mg tablet   No No  
Sig: TAKE 1 TABLET BY MOUTH EVERY DAY  
lidocaine 4 % patch   No No  
Sig: Apply to right shoulder  
pantoprazole (PROTONIX) 40 mg tablet   Yes No  
Sig: Take 40 mg by mouth daily. sertraline (ZOLOFT) 50 mg tablet   No No  
Sig: Take 1 Tab by mouth daily. traZODone (DESYREL) 50 mg tablet   No No  
Sig: TAKE 1/2-1 TABS BY MOUTH NIGHTLY. warfarin (COUMADIN) 4 mg tablet   Yes No  
Sig: Take 4 mg by mouth every evening. Indications: Blood Clot caused by Artificial Heart Valve  
warfarin (COUMADIN) 5 mg tablet   Yes No  
Sig: Take 5 mg by mouth nightly. Facility-Administered Medications: None Adult Exam  
 General: intubated Head: normocephalic, atraumatic ENT: moist mucous membranes Neck: supple, non-tender; full range of motion Cardiovascular: regular rate and rhythm, normal peripheral perfusion, 1+ edema in bilat LE. Equal radial pulses Respiratory: Intubated nasally. Coarse breath sounds noted throughout. Concern for CHF. Tube placement check with glidescope appeared to be in place going past the vocal cord. Gastrointestinal: soft, non-tender; no rebound or guarding, no peritoneal signs, no distension Back: non-tender, full range of motion Musculoskeletal: normal range of motion, normal strength, no gross deformities Neurological: Spontaneous movements of arms and legs. She open her eyes when I called her names. Psychiatric: sedated on Versed MDM: 
Breath sounds equal bilaterally. Tube placement confirmed. Will leave in place at this time since she is tolerating. Chest x-ray tube appear to be deep and leaning toward the mainstem. It was pulled back 3 cm. We will obtain a repeat chest x-ray for tube placement EKG rate of 83. Demand pacing noted. Not consistent with STEMI criteria. Fluid overload suspected. Currently on 40 of oral Lasix daily. Will give 40 of IV Lasix. Lactic acid is elevated at 5.0 See no signs of infection. Chest x-ray without consolidation. I have a lower suspicion for sepsis. Blood culture has been drawn. We will recheck the lactic acid. I suspect it is likely secondary to her increased work of breathing prior to EMS arrival. 
She is very comfortable on the ventilator. She has no leukocytosis. Hemoglobin is 8.7 which is similar to previous. Troponin is 0.07. Upper limits of normal. 
Given the fact that she is intubated I would speak with the intensivist who will come down to assess the patient for ICU placement. Likely secondary to CHF causing respiratory failure. Will admit to their service for further management.   
  
 
Xr Chest Sngl V 
 
 Result Date: 8/7/2019 CHEST X-RAY, single portable view  8/7/2019 History: Intubated. Technique: Single frontal view of the chest. Comparison: Chest x-ray 7/28/2019 Findings: A stable left-sided intracardiac device is seen. An endotracheal tube is now seen. The tip of this is low in position located at the robert directed towards the left mainstem bronchus. Retraction of this by 3 cm should place the tip at the level of a clavicles. Stable moderate to severe cardiomegaly is seen. Lungs are expanded without appreciable pneumothorax. No evolving consolidation, pleural effusion is seen. IMPRESSION: 1. Low position of endotracheal tube tip located at the robert with the tip directed towards the left mainstem bronchus. Recommend retraction of this by 3 cm and a repeat chest x-ray to confirm position. 2. Stable cardiomegaly. Recent Results (from the past 12 hour(s)) CBC WITH AUTOMATED DIFF Collection Time: 08/07/19  9:53 AM  
Result Value Ref Range WBC 8.6 4.3 - 11.1 K/uL  
 RBC 3.11 (L) 4.05 - 5.2 M/uL HGB 8.7 (L) 11.7 - 15.4 g/dL HCT 29.8 (L) 35.8 - 46.3 % MCV 95.8 79.6 - 97.8 FL  
 MCH 28.0 26.1 - 32.9 PG  
 MCHC 29.2 (L) 31.4 - 35.0 g/dL  
 RDW 15.7 (H) 11.9 - 14.6 % PLATELET 097 317 - 021 K/uL MPV 11.5 9.4 - 12.3 FL ABSOLUTE NRBC 0.03 0.0 - 0.2 K/uL  
 DF AUTOMATED NEUTROPHILS 85 (H) 43 - 78 % LYMPHOCYTES 7 (L) 13 - 44 % MONOCYTES 7 4.0 - 12.0 % EOSINOPHILS 0 (L) 0.5 - 7.8 % BASOPHILS 1 0.0 - 2.0 % IMMATURE GRANULOCYTES 1 0.0 - 5.0 %  
 ABS. NEUTROPHILS 7.3 1.7 - 8.2 K/UL  
 ABS. LYMPHOCYTES 0.6 0.5 - 4.6 K/UL  
 ABS. MONOCYTES 0.6 0.1 - 1.3 K/UL  
 ABS. EOSINOPHILS 0.0 0.0 - 0.8 K/UL  
 ABS. BASOPHILS 0.1 0.0 - 0.2 K/UL  
 ABS. IMM. GRANS. 0.1 0.0 - 0.5 K/UL PROTHROMBIN TIME + INR Collection Time: 08/07/19  9:53 AM  
Result Value Ref Range Prothrombin time 26.6 (H) 11.7 - 14.5 sec INR 2.5 POC TROPONIN-I  Collection Time: 08/07/19 10:00 AM  
 Result Value Ref Range Troponin-I (POC) 0.07 (H) 0.02 - 0.05 ng/ml POC LACTIC ACID Collection Time: 08/07/19 10:01 AM  
Result Value Ref Range Lactic Acid (POC) 5.02 (H) 0.5 - 1.9 mmol/L Dragon voice recognition software was used to create this note. Although the note has been reviewed and corrected where necessary, additional errors may have been overlooked and remain in the text.

## 2019-08-07 NOTE — PROGRESS NOTES
Patient arrived via EMS intubated. Placed on ventilator upon arrival to ER. Ventilator check complete; patient has a #7.0 nasal ET tube secured at the 26 at the right nare. Patient is not sedated. Patient is not able to follow commands. Breath sounds are coarse and crackles. Trachea is midline, Negative for subcutaneous air, and chest excursion is symmetric. Patient is also Negative for cyanosis and is Negative for pitting edema. All alarms are set and audible. Resuscitation bag is at the head of the bed. Ventilator Settings Mode FIO2 Rate Tidal Volume Pressure PEEP I:E Ratio PRVC  80 %   22 450 ml     10 cm H20  1:2.2 Peak airway pressure: 37 cm H2O Minute ventilation: 10 l/min ABG: No results for input(s): PH, PCO2, PO2, HCO3 in the last 72 hours.  
 
 
Alireza Jasso, RT

## 2019-08-07 NOTE — ED NOTES
Pt arrived via EMS. Pt was having difficulty breathing last night when went to bed. When FD arrived, SpO2 50%. Pt was shallow breathing and AMS. EMS nasally intubated, Spo2 100%. /60 HR 70, paced. BGL 93 Afebrile. Pt had pinpoint pupils and EMS gave 0.5 of narcan with no improvement, 5mg albuterol, 5 of versed. Pt has been intubated numerous times per daughter.

## 2019-08-07 NOTE — H&P
HISTORY AND PHYSICAL [address] 8/7/2019 Date of Admission:  8/7/2019 The patient's chart is reviewed and the patient is discussed with the staff. Subjective:  
 
Patient is a 68 y.o.  female with chronic respiratory failure on 3lpm & nocturnal Trilogy who presents with respiratory distress. Was found by family members this morning with her mask off, \"not looking good\", swollen and EMS was called. Family states she called about 5-6PM last night and reported Trilogy was alarming and was placing herself on O2. Unknown how long mask was off. Daughter said this morning she was conversant but then \"went out\". From last admission she has a DNR in place but family felt she was \"suffering\" today, O2 sat per family was 35% and they allowed nasal intubation. Per EMS O2 sat was 50%--was not responsive. She received Versed in route and in the ER then placed on Versed drip as she began to move some. Was just discharged home Friday after hospital stay for syncopal type episode. Had been diagnosed with small bowel mass, Heparin bridge for AVR and endoscopy was planned but cancelled per patient desires. She is being admitted to the ICU on vent support for acute hypoxic respiratory failure. Was doing fairly well over the weekend. She is debilitated, slightly mobile with walker and overall debilitated. She fell Monday and EMS was called to the home twice but did not feel she needed to be brought to hospital.  Family reports her meds have been provided but not sure she is taking them. Was to see Dr. Cheryl West Monday but she called and cancelled appointment. Chronic Medical:  Systolic/diastolic heart failure, CAD with previous stent, cardiomyopathy with EF 20-25%, AVR with mechanical valve in 2005 and on Coumadin, ICD, HTN, HLD, COPD, pulmonary hypertension, OA, DM, MDS, former tobacco abuse-quit 2014. Review of Systems Review of systems not obtained due to patient factors. Patient Active Problem List  
Diagnosis Code  Iron deficiency anemia due to chronic blood loss D50.0  MDS (myelodysplastic syndrome) ([facility]) D46.9  CAD (coronary artery disease) I25.10  Chronic combined systolic and diastolic heart failure ([facility]) I50.42  
 Essential hypertension, benign I10  
 Anticoagulated on Coumadin Z79.01  
 CKD (chronic kidney disease) stage 4, GFR 15-29 ml/min ([facility]) N18.4  COPD (chronic obstructive pulmonary disease) ([facility]) J44.9  REJI (obstructive sleep apnea)-cpap G47.33  
 ICD (implantable cardioverter-defibrillator) in place Z95.810  
 Osteopenia M85.80  
 HLD (hyperlipidemia) E78.5  Osteoarthritis M19.90  
 S/P AVR (aortic valve replacement) Z95.2  Cardiomyopathy ([address] I42.9  Type 2 diabetes mellitus with nephropathy ([facility]) E11.21  
 Closed nondisplaced fracture of shaft of fifth metacarpal bone of left hand S62.357A  Physical debility R53.81  
 Encounter for immunization Z23  Anemia D64.9  Chronic respiratory failure with hypoxia ([facility]) J96.11  
 Debility R53.81  
 Acute exacerbation of CHF (congestive heart failure) ([facility]) I50.9  Systolic CHF, acute on chronic ([facility]) I50.23  
 Acute on chronic combined systolic and diastolic CHF (congestive heart failure) ([facility]) I50.43  
 Skin defect L98.9  Severe obesity ([facility]) E66.01  
 Excessive granulation tissue L92.9  Symptomatic anemia D64.9  Right shoulder pain M25.511  
 CHF exacerbation ([facility]) I50.9  Hypercapnic respiratory failure ([facility]) J96.92  
 Adenomatous duodenal polyp D13.2  Acute on chronic respiratory failure with hypoxia ([facility]) J96.21 Prior to Admission Medications Prescriptions Last Dose Informant Patient Reported? Taking? OXYGEN-AIR DELIVERY SYSTEMS   Yes No  
Sig: 3 L by Nasal route continuous. acetaminophen (TYLENOL) 325 mg tablet   Yes No  
 Sig: Take 650 mg by mouth every six (6) hours as needed for Pain. albuterol (VENTOLIN HFA) 90 mcg/actuation inhaler   No No  
Si puffs qid prn Patient taking differently: Take 2 Puffs by inhalation every six (6) hours as needed for Wheezing or Shortness of Breath. aspirin delayed-release 81 mg tablet   Yes No  
Sig: Take 81 mg by mouth daily. carvedilol (COREG) 6.25 mg tablet   No No  
Sig: Take 1 Tab by mouth two (2) times daily (with meals). docusate sodium (COLACE) 50 mg capsule   Yes No  
Sig: Take 50 mg by mouth daily. epoetin jairo-epbx (RETACRIT) 10,000 unit/mL injection   No No  
Si mL by SubCUTAneous route Every Tues, Thur & Sat. Indications: anemia due to kidney failure Patient taking differently: 2 mL by SubCUTAneous route Every Tues, Thur & Sat. will be given at MD office  Indications: anemia due to kidney failure  
ferrous gluconate 324 mg (38 mg iron) tablet   No No  
Sig: TAKE 1 TAB BY MOUTH DAILY (BEFORE BREAKFAST). INDICATIONS: IRON DEFICIENCY ANEMIA Patient taking differently: Take 325 mg by mouth two (2) times daily (with meals). fluticasone (FLONASE) 50 mcg/actuation nasal spray   No No  
Si Sprays by Both Nostrils route daily as needed. Patient taking differently: 2 Sprays by Both Nostrils route daily as needed for Allergies. fluticasone-vilanterol (BREO ELLIPTA) 200-25 mcg/dose inhaler   No No  
Sig: Take 1 Puff by inhalation daily. furosemide (LASIX) 40 mg tablet   No No  
Sig: TAKE 1 TABLET BY MOUTH EVERY DAY  
lidocaine 4 % patch   No No  
Sig: Apply to right shoulder  
pantoprazole (PROTONIX) 40 mg tablet   Yes No  
Sig: Take 40 mg by mouth daily. sertraline (ZOLOFT) 50 mg tablet   No No  
Sig: Take 1 Tab by mouth daily. traZODone (DESYREL) 50 mg tablet   No No  
Sig: TAKE 1/2-1 TABS BY MOUTH NIGHTLY. warfarin (COUMADIN) 4 mg tablet   Yes No  
Sig: Take 4 mg by mouth every evening. Indications: Blood Clot caused by Artificial Heart Valve warfarin (COUMADIN) 5 mg tablet   Yes No  
Sig: Take 5 mg by mouth nightly. Facility-Administered Medications: None Past Medical History:  
Diagnosis Date  Anticoagulated on Coumadin 2013 S/P AVR  CAD (coronary artery disease) 2013 PCI LAD with stent placed  Cardiomyopathy ([facility]  CHF (congestive heart failure) ([facility] 2017  CKD (chronic kidney disease) stage 3, GFR 30-59 ml/min ([facility]) 7/10/2013  COPD (chronic obstructive pulmonary disease) ([facility] 2014  Diabetes ([facility]  Essential hypertension, benign 2013  HLD (hyperlipidemia)  ICD (implantable cardioverter-defibrillator) in place 10/2/2014 [employer] single-chamber ICD implantation 10/20/14  Iron deficiency anemia due to chronic blood loss 2009  MDS (myelodysplastic syndrome) ([facility] 2011 Procrit started in 11 Procrit weekly and Iron stores. 12 good response to 3 weekly procrit did not need it last time  3--13 Pt doing well. Just wanted a \"check-up. \" Responding to Procrit every three weeks. 13 patient has missed some injections on recently restarted hemoglobin only issue , takes oral iron iron stores each time  REJI (obstructive sleep apnea)-cpap 2014  Osteoarthritis  Osteopenia  Pulmonary hypertension ([facility] 6/15/2016  Quadrantanopia  Skin defect 2018  Visual complaint 2015 Past Surgical History:  
Procedure Laterality Date [address]    COLONOSCOPY N/A 2019 COLONOSCOPY/ BMI 40 ROOM 637 performed by Deann Vidales MD at [address] [place] [address], [facility]  
 mechanical valve   HX  SECTION    
 HX CORONARY STENT PLACEMENT  May, 2014 STEMI with Stent placement.  HX ENDOSCOPY  2009 EGD [address] CATHETERIZATION    HX PACEMAKER  10/2/2014 [employer] ICD  HX TUBAL LIGATION    
  IR BX BONE MARROW DIAGNOSTIC  2011 Social History Socioeconomic History  Marital status:  Spouse name: Not on file  Number of children: Not on file  Years of education: Not on file  Highest education level: Not on file Occupational History Employer: RETIRED Comment: [employer] Social Needs  Financial resource strain: Not on file  Food insecurity:  
  Worry: Not on file Inability: Not on file  Transportation needs:  
  Medical: Not on file Non-medical: Not on file Tobacco Use  Smoking status: Former Smoker Packs/day: 0.25 Years: 42.00 Pack years: 10.50 Types: Cigarettes Start date: 10/1/1956 Last attempt to quit: 2014 Years since quittin.2  Smokeless tobacco: Never Used Substance and Sexual Activity  Alcohol use: No  
  Alcohol/week: 0.0 standard drinks  Drug use: No  
 Sexual activity: Not on file Lifestyle  Physical activity:  
  Days per week: Not on file Minutes per session: Not on file  Stress: Not on file Relationships  Social connections:  
  Talks on phone: Not on file Gets together: Not on file Attends Taoism service: Not on file Active member of club or organization: Not on file Attends meetings of clubs or organizations: Not on file Relationship status: Not on file  Intimate partner violence:  
  Fear of current or ex partner: Not on file Emotionally abused: Not on file Physically abused: Not on file Forced sexual activity: Not on file Other Topics Concern [address] Service Not Asked  Blood Transfusions Not Asked  Caffeine Concern Not Asked  Occupational Exposure Not Asked Daisy Raquel Hazards Not Asked  Sleep Concern Not Asked  Stress Concern Not Asked  Weight Concern Yes  Special Diet Yes  Back Care Not Asked  Exercise Not Asked  Bike Helmet Not Asked  Seat Belt Not Asked  Self-Exams Not Asked Social History Narrative Lives with her daughter. Gavin Chicas and Judy Diallo are caregivers. Ambulates only short distances. Family History Problem Relation Age of Onset  Heart Disease Mother CHF  Hypertension Mother  Kidney Disease Mother  Heart Disease Father CHF  Lung Disease Father  Diabetes Father  Cancer Father   
     prostate  Hypertension Father  Heart Attack Father  Heart Disease Maternal Aunt  Diabetes Brother  Coronary Artery Disease Other  Breast Cancer Neg Hx Allergies Allergen Reactions  Ferrlecit [Sodium Ferric Gluconat-Sucrose] Other (comments) Chest pain, reported MI after administration  Sulfa (Sulfonamide Antibiotics) Hives  Aspirin Nausea Only Cannot take uncoated aspirin Current Facility-Administered Medications Medication Dose Route Frequency  midazolam (VERSED) 100 mg in 0.9% sodium chloride 100 mL infusion  0-10 mg/hr IntraVENous TITRATE  sodium chloride (NS) flush 5-40 mL  5-40 mL IntraVENous Q8H  
 sodium chloride (NS) flush 5-40 mL  5-40 mL IntraVENous PRN  
 LORazepam (ATIVAN) injection 2 mg  2 mg IntraVENous Q4H PRN  propofol (DIPRIVAN) infusion  5-50 mcg/kg/min IntraVENous TITRATE Current Outpatient Medications Medication Sig  warfarin (COUMADIN) 5 mg tablet Take 5 mg by mouth nightly.  lidocaine 4 % patch Apply to right shoulder  epoetin jairo-epbx (RETACRIT) 10,000 unit/mL injection 2 mL by SubCUTAneous route Every Tues, Thur & Sat. Indications: anemia due to kidney failure (Patient taking differently: 2 mL by SubCUTAneous route Every Tues, Thur & Sat. will be given at MD office  Indications: anemia due to kidney failure)  warfarin (COUMADIN) 4 mg tablet Take 4 mg by mouth every evening. Indications: Blood Clot caused by Artificial Heart Valve  acetaminophen (TYLENOL) 325 mg tablet Take 650 mg by mouth every six (6) hours as needed for Pain.  pantoprazole (PROTONIX) 40 mg tablet Take 40 mg by mouth daily.  furosemide (LASIX) 40 mg tablet TAKE 1 TABLET BY MOUTH EVERY DAY  traZODone (DESYREL) 50 mg tablet TAKE 1/2-1 TABS BY MOUTH NIGHTLY.  carvedilol (COREG) 6.25 mg tablet Take 1 Tab by mouth two (2) times daily (with meals).  sertraline (ZOLOFT) 50 mg tablet Take 1 Tab by mouth daily.  fluticasone-vilanterol (BREO ELLIPTA) 200-25 mcg/dose inhaler Take 1 Puff by inhalation daily.  aspirin delayed-release 81 mg tablet Take 81 mg by mouth daily.  docusate sodium (COLACE) 50 mg capsule Take 50 mg by mouth daily.  ferrous gluconate 324 mg (38 mg iron) tablet TAKE 1 TAB BY MOUTH DAILY (BEFORE BREAKFAST). INDICATIONS: IRON DEFICIENCY ANEMIA (Patient taking differently: Take 325 mg by mouth two (2) times daily (with meals). )  fluticasone (FLONASE) 50 mcg/actuation nasal spray 2 Sprays by Both Nostrils route daily as needed. (Patient taking differently: 2 Sprays by Both Nostrils route daily as needed for Allergies.)  albuterol (VENTOLIN HFA) 90 mcg/actuation inhaler 2 puffs qid prn (Patient taking differently: Take 2 Puffs by inhalation every six (6) hours as needed for Wheezing or Shortness of Breath.)  OXYGEN-AIR DELIVERY SYSTEMS 3 L by Nasal route continuous. Objective:  
 
Vitals:  
 08/07/19 1238 08/07/19 1243 08/07/19 1248 08/07/19 1253 BP: 162/71 157/68 151/67 149/66 Pulse: 73 72 70 72 Resp: 20 17 20 20 Temp:      
SpO2: 99% 99% 98% 98% Weight:      
Height: PHYSICAL EXAM  
 
Constitutional:  the patient is obese, sedated, nasally intubated and on vent support EENMT:  Sclera clear, pupils equal, oral mucosa moist, left nare intubation Respiratory: scattered anterior crackles Cardiovascular:  RRR with ICD Gastrointestinal: soft with positive bowel sounds. Musculoskeletal: warm without cyanosis. There is 2-3+ lower extremity edema. Skin:  no jaundice or rashes, healing left shin lesion, scattered ecchmotic areas Neurologic: no gross neuro deficits Psychiatric:  Not following any commands and not breathing over the vent. Some spontaneous LE movements ECHO 7/29/19: 
-  Left ventricle: Moderately dilated. Systolic function was markedly reduced. EF 20-25 %. Severe diffuse hypokinesis. Wall thickness was mildly increased. Avg. E/e'= 31.9. Features consistent with (grade 2 diastolic dysfunction). -  Right ventricle: The ventricle was dilated. Systolic function was reduced. Estimated peak pressure was in the range of 85-90 mmHg. -  Left atrium: The atrium was markedly dilated. -  Right atrium: The atrium was markedly dilated. -  Inferior vena cava, hepatic veins: The inferior vena cava was dilated. Respirophasic changes in dimension were absent. -  Aortic valve: A mechanical prosthesis was present. (2005). -  Mitral valve: Moderate annular calcification. Moderate regurgitation. -  Tricuspid valve: Moderate to severe regurgitation. -  Pulmonic valve: Mild to moderate regurgitation. CXR: 
8/7/19: 
 
 
Ventilator Settings Mode FIO2 Rate Tidal Volume Pressure PEEP [facility] control[facility] due to high PIP)  40 %    450 ml     10 cm H20 Peak airway pressure: 36 cm H2O Minute ventilation: 8.9 l/min ABG: No results for input(s): PH, PCO2, PO2, HCO3, PHI, PCO2I, PO2I, HCO3I in the last 72 hours. Recent Labs 08/07/19 
0953 08/05/19 
1010 WBC 8.6  --   
HGB 8.7*  --   
HCT 29.8*  --   
  --   
INR 2.5 1.5* Recent Labs 08/07/19 
1108   
K 4.8  
CL 98  
GLU 92  
CO2 34* BUN 65* CREA 2.87* CA 8.7 ALB 2.9* TBILI 1.1 * SGOT 835* No results for input(s): PH, PCO2, PO2, HCO3, PHI, PCO2I, PO2I, HCO3I in the last 72 hours. No results for input(s): LCAD, LAC in the last 72 hours. Assessment:  (Medical Decision Making) Hospital Problems  Date Reviewed: 8/7/2019 Codes Class Noted POA * (Principal) Acute on chronic respiratory failure with hypoxia (HCC) ICD-10-CM: J96.21 
ICD-9-CM: 518.84, 799.02  8/7/2019 Yes Severe obesity ([facility] ICD-10-CM: E66.01 
ICD-9-CM: 278.01  1/15/2019 Yes Acute exacerbation of CHF (congestive heart failure) (HCC) ICD-10-CM: I50.9 ICD-9-CM: 428.0  11/14/2018 Yes Debility ICD-10-CM: R53.81 ICD-9-CM: 799.3  9/8/2018 Yes Chronic respiratory failure with hypoxia [facility]) ICD-10-CM: J96.11 
ICD-9-CM: 518.83, 799.02  9/7/2018 Yes Type 2 diabetes mellitus with nephropathy (HCC) (Chronic) ICD-10-CM: E11.21 
ICD-9-CM: 250.40, 583.81  12/18/2017 Yes S/P AVR (aortic valve replacement) (Chronic) ICD-10-CM: Z95.2 ICD-9-CM: V43.3  Unknown Yes Overview Signed 2/23/2016 11:04 AM by Rand Bravo Mechanical/Artific Cardiomyopathy ([facility] (Chronic) ICD-10-CM: I42.9 ICD-9-CM: 425.4  Unknown Yes  
   
 ICD (implantable cardioverter-defibrillator) in place ICD-10-CM: Z95.810 ICD-9-CM: V45.02  10/2/2014 Yes Overview Signed 10/2/2014  4:58 PM by Dung Min [employer] single-chamber ICD implantation 10/20/14 COPD (chronic obstructive pulmonary disease) (HCC) (Chronic) ICD-10-CM: J44.9 ICD-9-CM: 434  4/2/2014 Yes Overview Signed 10/6/2018  8:56 PM by Rafita Forman NP  
  HOME OXYGEN 3 LITERS 
  
  
   
 REJI (obstructive sleep apnea)-cpap (Chronic) ICD-10-CM: G47.33 
ICD-9-CM: 327.23  4/2/2014 Yes Overview Signed 8/7/2019 12:50 PM by Ashia Suárez NP Home Trilogy CKD (chronic kidney disease) stage 4, GFR 15-29 ml/min ([facility]) ICD-10-CM: N18.4 ICD-9-CM: 585.4  7/10/2013 Yes Plan:  (Medical Decision Making) --Will admit to the ICU on vent support for further medical management --Has received Lasix IV--. Lasix 40mg IV BID 
--Respiratory nebulizer treatments --Blood culture drawn in ER 
--Will try to wean sedation with hopes of extubation. --Family confirmed DNR status More than 50% of the time documented was spent in face-to-face contact with the patient and in the care of the patient on the floor/unit where the patient is located. Carole Trotter NP I have spoken with and examined the patient. I agree with the above assessment and plan as documented. Mrs. Diaz is now awake and alert and initiating breaths on the ventilator. We will wean the vent to try to extubate. CXR  is not suggestive of severe pulmonary edema. Last TTE with severe pulmonary hypertension with markedly dilated LA. Gen: awake, following commands Lungs: few crackles Heart:  RRR SM Abd: NTND Ext: 2+ edema Wean to extubate. Continue IV diuretics and monitor FELIZ closely with this Consult hospitalist and palliative care Now DNR. No respiratory support beyond BiPAP per family.  
 
Diana Elizondo MD

## 2019-08-07 NOTE — PROGRESS NOTES
Patient with recent admit and discharge from [facility] (7-28-19 to 8-2-19) with referral to [facility] and [facility]. PCP - Dr. Max Su.

## 2019-08-07 NOTE — CONSULTS
Palliative Care Patient: Aquilino Santillan MRN: [id number]  SSN: [id number] YOB: 1950  Age: 68 y.o. Sex: female Date of Request: 8/7/2019 Date of Consult:  8/7/2019 Reason for Consult:  goals of care Requesting Physician: Dr. Nate Avery Assessment/Plan:  
 
Principal Diagnosis:   
Pain, general  R52 Additional Diagnoses: · Respiratory Failure, Acute on Chronic  J96.20 · Counseling, Encounter for Medical Advice  Z71.9 
· Encounter for Palliative Care  Z51.5 Palliative Performance Scale (PPS): PPS: 30 Medical Decision Making:  
Reviewed and summarized chart from admission to present. Discussed case with appropriate providers: primary RN Reviewed laboratory and x-ray data. Patient nasally intubated on ventilator, oldest daughter Lizzie Amaral is at the bedside. Patient known to palliative care service; just seen during admission last week. Provided emotional support to daughter at the bedside who remembers when patient was on ventilator 5 years ago and says \"my mom is a miracle\" and expresses hope that patient will \"live some more\". Patient remains on pressure control. Patient is drowsy, but awakens for short periods of time, nodding appropriately. She says that her throat hurts from ET tube, RN aware. Patient will be admitted to ICU and is a DNR at this time. Patient is living in her own home with CLTC aid 6 days/week, approximately 3 hours/day. Despite this and involvement of three daughters, she has had multiple admissions, is noncompliant with trilogy. Will need to discuss goals of care, placement, etc. 
 
Will discuss findings with members of the interdisciplinary team.   
 
Thank you for this referral.    
 
  
. 
 
Subjective:  
 
History obtained from:  Family, Care Provider and Chart Chief Complaint: Respiratory failure History of Present Illness:  Ms. Diaz is a 67 yo with PMH of MDS, heart failure, CAD, AVR, COPD, CKD, DM, HTN, HLD, and other history as listed below. She was just discharged from [facility] on 8/2. She was found by family this morning not looking well, with oxygen sats on home oximetry in 30s. EMS was called and sats were in the 46s. She was nasally intubated and brought to [facility] ER. She is being admitted for further management of acute on chronic respiratory failure. Advance Directive: Yes Code Status:  DNR Health Care Power of : Yes - Copy of [address] on file. Past Medical History:  
Diagnosis Date  Anticoagulated on Coumadin 7/9/2013 S/P AVR  CAD (coronary artery disease) 1/20/2013 5/8/14 PCI LAD with stent placed  Cardiomyopathy ([address]  CHF (congestive heart failure) ([address] 2/17/2017  CKD (chronic kidney disease) stage 3, GFR 30-59 ml/min ([facility]) 7/10/2013  COPD (chronic obstructive pulmonary disease) ([address] 4/2/2014  Diabetes ([address]  Essential hypertension, benign 1/20/2013  HLD (hyperlipidemia)  ICD (implantable cardioverter-defibrillator) in place 10/2/2014 [employer] single-chamber ICD implantation 10/20/14  Iron deficiency anemia due to chronic blood loss 7/29/2009  MDS (myelodysplastic syndrome) ([address] 12/17/2011 Procrit started in August, 2011 12/18/11 Procrit weekly and Iron stores. 5-12 12-13-12 good response to 3 weekly procrit did not need it last time  3-7-13 Pt doing well. Just wanted a \"check-up. \" Responding to Procrit every three weeks. 8-29-13 patient has missed some injections on recently restarted hemoglobin only issue , takes oral iron iron stores each time  REJI (obstructive sleep apnea)-cpap 4/2/2014  Osteoarthritis  Osteopenia  Pulmonary hypertension ([address] 6/15/2016  Quadrantanopia  Skin defect 11/1/2018  Visual complaint 12/14/2015 Past Surgical History:  
Procedure Laterality Date [address]  2009  COLONOSCOPY N/A 2019 COLONOSCOPY/ BMI 40 ROOM 637 performed by Evi Wiggins MD at [address] [facility], [facility]  
 mechanical valve   HX  SECTION    
 HX CORONARY STENT PLACEMENT  May, 2014 STEMI with Stent placement.  HX ENDOSCOPY  2009 EGD [address] CATHETERIZATION    HX PACEMAKER  10/2/2014 Bi[employer] ICD  HX TUBAL LIGATION    
 IR BX BONE MARROW DIAGNOSTIC  2011 Family History Problem Relation Age of Onset  Heart Disease Mother CHF  Hypertension Mother  Kidney Disease Mother  Heart Disease Father CHF  Lung Disease Father  Diabetes Father  Cancer Father   
     prostate  Hypertension Father  Heart Attack Father  Heart Disease Maternal Aunt  Diabetes Brother  Coronary Artery Disease Other  Breast Cancer Neg Hx Social History Tobacco Use  Smoking status: Former Smoker Packs/day: 0.25 Years: 42.00 Pack years: 10.50 Types: Cigarettes Start date: 10/1/1956 Last attempt to quit: 2014 Years since quittin.2  Smokeless tobacco: Never Used Substance Use Topics  Alcohol use: No  
  Alcohol/week: 0.0 standard drinks Prior to Admission medications Medication Sig Start Date End Date Taking? Authorizing Provider  
warfarin (COUMADIN) 5 mg tablet Take 5 mg by mouth nightly. Yes Provider, Historical  
lidocaine 4 % patch Apply to right shoulder 19  Yes Rosemary Charlton NP  
epoetin jairo-epbx (RETACRIT) 10,000 unit/mL injection 2 mL by SubCUTAneous route Every Tues, Thur & Sat. Indications: anemia due to kidney failure Patient taking differently: 2 mL by SubCUTAneous route Every Tues, Thur & Sat. will be given at MD office  Indications: anemia due to kidney failure 8/3/19  Yes Rosemary Charlton NP  
warfarin (COUMADIN) 4 mg tablet Take 4 mg by mouth every evening. Indications: Blood Clot caused by Artificial Heart Valve   Yes Provider, Historical  
acetaminophen (TYLENOL) 325 mg tablet Take 650 mg by mouth every six (6) hours as needed for Pain. Yes Provider, Historical  
pantoprazole (PROTONIX) 40 mg tablet Take 40 mg by mouth daily. Yes Provider, Historical  
furosemide (LASIX) 40 mg tablet TAKE 1 TABLET BY MOUTH EVERY DAY 4/22/19  Yes Felisha Walsh MD  
traZODone (DESYREL) 50 mg tablet TAKE 1/2-1 TABS BY MOUTH NIGHTLY. 3/19/19  Yes Felisha Walsh MD  
carvedilol (COREG) 6.25 mg tablet Take 1 Tab by mouth two (2) times daily (with meals). 1/21/19  Yes Titus Zaragoza MD  
sertraline (ZOLOFT) 50 mg tablet Take 1 Tab by mouth daily. 12/18/18  Yes Thais Marques MD  
aspirin delayed-release 81 mg tablet Take 81 mg by mouth daily. Yes Provider, Historical  
docusate sodium (COLACE) 50 mg capsule Take 50 mg by mouth daily. Yes Provider, Historical  
ferrous gluconate 324 mg (38 mg iron) tablet TAKE 1 TAB BY MOUTH DAILY (BEFORE BREAKFAST). INDICATIONS: IRON DEFICIENCY ANEMIA Patient taking differently: Take 325 mg by mouth two (2) times daily (with meals). 4/5/18  Yes Thais Marques MD  
fluticasone (FLONASE) 50 mcg/actuation nasal spray 2 Sprays by Both Nostrils route daily as needed. Patient taking differently: 2 Sprays by Both Nostrils route daily as needed for Allergies. 3/7/18  Yes Thais Marques MD  
albuterol (VENTOLIN HFA) 90 mcg/actuation inhaler 2 puffs qid prn Patient taking differently: Take 2 Puffs by inhalation every six (6) hours as needed for Wheezing or Shortness of Breath. 8/30/17  Yes Miller OROURKE NP  
OXYGEN-AIR DELIVERY SYSTEMS 3 L by Nasal route continuous. Yes Provider, Historical  
fluticasone-vilanterol (BREO ELLIPTA) 200-25 mcg/dose inhaler Take 1 Puff by inhalation daily. 11/1/18   Anny Gloria NP Allergies Allergen Reactions  Ferrlecit [Sodium Ferric Gluconat-Sucrose] Other (comments) Chest pain, reported MI after administration  Sulfa (Sulfonamide Antibiotics) Hives  Aspirin Nausea Only Cannot take uncoated aspirin Review of Systems: 
Review of systems not obtained due to patient factors: lethargic and nasally intubated Objective:  
 
Visit Vitals /74 Pulse 75 Temp 100.1 °F (37.8 °C) Resp 16 Ht 5' 2\" (1.575 m) Wt 206 lb (93.4 kg) SpO2 97% BMI 37.68 kg/m² Physical Exam: 
 
General:  Lethargic, nasally intubated. No acute distress. Eyes:  Conjunctivae/corneas clear. Nose: Nares normal. Septum midline. Nasally intubated. Neck: Supple, symmetrical, trachea midline. Lungs:   Diminished bilaterally, unlabored on vent. Heart:  Regular rate and rhythm. Valvular click. Abdomen:   Soft, non-tender, non-distended. Extremities: Normal, atraumatic, no cyanosis. Skin: Skin color, texture, turgor normal. No rash. Neurologic: Lethargic. Psych: Unable to assess. Assessment:  
 
Hospital Problems  Date Reviewed: 8/7/2019 Codes Class Noted POA * (Principal) Acute on chronic respiratory failure with hypoxia (HCC) ICD-10-CM: J96.21 
ICD-9-CM: 518.84, 799.02  8/7/2019 Yes Severe obesity ([address] ICD-10-CM: E66.01 
ICD-9-CM: 278.01  1/15/2019 Yes Acute exacerbation of CHF (congestive heart failure) (HCC) ICD-10-CM: I50.9 ICD-9-CM: 428.0  11/14/2018 Yes Debility ICD-10-CM: R53.81 ICD-9-CM: 799.3  9/8/2018 Yes Chronic respiratory failure with hypoxia [facility]) ICD-10-CM: J96.11 
ICD-9-CM: 518.83, 799.02  9/7/2018 Yes Type 2 diabetes mellitus with nephropathy (HCC) (Chronic) ICD-10-CM: E11.21 
ICD-9-CM: 250.40, 583.81  12/18/2017 Yes S/P AVR (aortic valve replacement) (Chronic) ICD-10-CM: Z95.2 ICD-9-CM: V43.3  Unknown Yes Overview Signed 2/23/2016 11:04 AM by Yael Velazquez/Elva Cardiomyopathy ([address] (Chronic) ICD-10-CM: I42.9 ICD-9-CM: 425.4  Unknown Yes  
   
 ICD (implantable cardioverter-defibrillator) in place ICD-10-CM: Z95.810 ICD-9-CM: V45.02  10/2/2014 Yes Overview Signed 10/2/2014  4:58 PM by Bhanu Montemayor Bi[employer] single-chamber ICD implantation 10/20/14 COPD (chronic obstructive pulmonary disease) ([facility]) (Chronic) ICD-10-CM: J44.9 ICD-9-CM: 273  4/2/2014 Yes Overview Signed 10/6/2018  8:56 PM by Linwood Valente NP  
  HOME OXYGEN 3 LITERS 
  
  
   
 REJI (obstructive sleep apnea)-cpap (Chronic) ICD-10-CM: G47.33 
ICD-9-CM: 327.23  4/2/2014 Yes Overview Signed 8/7/2019 12:50 PM by Manjeet Howell NP Home Trilogy CKD (chronic kidney disease) stage 4, GFR 15-29 ml/min ([facility]) ICD-10-CM: N18.4 ICD-9-CM: 585.4  7/10/2013 Yes Signed By: Betsey Briones NP August 7, 2019

## 2019-08-08 PROBLEM — N18.9 ACUTE RENAL FAILURE SUPERIMPOSED ON CHRONIC KIDNEY DISEASE (HCC): Status: ACTIVE | Noted: 2019-01-01

## 2019-08-08 PROBLEM — N17.9 ACUTE RENAL FAILURE SUPERIMPOSED ON CHRONIC KIDNEY DISEASE (HCC): Status: ACTIVE | Noted: 2019-01-01

## 2019-08-08 NOTE — PROGRESS NOTES
Critical Care Daily Progress Note: 8/8/2019 Admission Date: 8/7/2019 The patient's chart is reviewed and the patient is discussed with the staff. 68 y.o. AAF with chronic respiratory failure on 3lpm & nocturnal Trilogy who presents with respiratory distress. Was found by family members \"not looking good\", swollen and EMS was called. A DNR was  in place but family felt she was \"suffering\" , O2 sat 35% and they allowed nasal intubation. Received Versed in route and placed on Versed drip. Was discharged home Friday after syncopal type episode. Had been diagnosed with small bowel mass, Heparin bridge for AVR and endoscopy was planned but cancelled per patient desires. She is being admitted to the ICU on vent support for acute hypoxic respiratory failure. 
  
Mobile with walker and overall debilitated. She fell Monday and EMS was called to the home twice but did not feel she needed to be brought to hospital.  
 
Chronic Medical:  Systolic/diastolic heart failure, CAD with previous stent, cardiomyopathy with EF 20-25%, AVR with mechanical valve in 2005 and on Coumadin, ICD, HTN, HLD, COPD, pulmonary hypertension, OA, DM, MDS, former tobacco abuse-quit 2014. Subjective:  
 
Lying in bed, nasally intubated on vent support. Mouthing words \"my throat hurts\", nodding responses and following commands. Current Facility-Administered Medications Medication Dose Route Frequency  sodium chloride (NS) flush 5-40 mL  5-40 mL IntraVENous Q8H  
 sodium chloride (NS) flush 5-40 mL  5-40 mL IntraVENous PRN  
 LORazepam (ATIVAN) injection 2 mg  2 mg IntraVENous Q4H PRN  propofol (DIPRIVAN) infusion  5-50 mcg/kg/min IntraVENous TITRATE  furosemide (LASIX) injection 40 mg  40 mg IntraVENous Q12H  
 albuterol (PROVENTIL VENTOLIN) nebulizer solution 2.5 mg  2.5 mg Nebulization Q4H RT  
 aspirin delayed-release tablet 81 mg  81 mg Oral DAILY  carvedilol (COREG) tablet 6.25 mg  6.25 mg Oral BID WITH MEALS  docusate sodium (COLACE) capsule 100 mg  100 mg Oral DAILY  lidocaine 4 % patch 1 Patch  1 Patch TransDERmal Q24H  pantoprazole (PROTONIX) tablet 40 mg  40 mg Oral DAILY  sertraline (ZOLOFT) tablet 50 mg  50 mg Oral DAILY  traZODone (DESYREL) tablet 50 mg  50 mg Oral QHS PRN  
 ondansetron (ZOFRAN) injection 4 mg  4 mg IntraVENous Q4H PRN Review of Systems Constitutional:  negative for fever, chills, sweats Cardiovascular:  LE edema, negative for chest pain, palpitations, syncope Gastrointestinal:  negative for dysphagia, reflux, vomiting, diarrhea, abdominal pain, or melena Neurologic:  negative for focal weakness, numbness, headache Objective:  
 
Vitals:  
 08/08/19 0302 08/08/19 7701 08/08/19 8456 08/08/19 2694 BP: 162/83 169/84 175/83 173/84 Pulse: 88 85 87 87 Resp: 15 16 16 15 Temp:      
SpO2: 97% 98% 100% 100% Weight:      
Height:      
 
 
 
Intake/Output Summary (Last 24 hours) at 8/8/2019 6112 Last data filed at 8/8/2019 1606 Gross per 24 hour Intake 107.38 ml Output 3851 ml Net -3743.62 ml Physical Exam:         
Constitutional:  the patient is obese, nasally intubated and on vent support EENMT:  Sclera clear, pupils equal, oral mucosa moist, left nare intubation Respiratory: few scattered anterior crackles Cardiovascular:  RRR with ICD Gastrointestinal: soft with positive bowel sounds. Musculoskeletal: warm without cyanosis. There is 1+ lower extremity edema. Bilateral SCDs Skin:  no jaundice or rashes, healing left shin lesion, scattered ecchmotic areas Neurologic: no gross neuro deficits Psychiatric:  Awake, following commands, nodding and moving all extremities LINES:   
Nasally intubated 8/7/19 PIV sites Ramirez 8/7/19 DRIPS:   
Diprivan 5 mcg/min ECHO 7/29/19: 
-  Left ventricle: Moderately dilated. Systolic function markedly reduced. EF 20-25 %. Severe diffuse hypokinesis. Wall thickness was mildly increased. Avg. E/e'= 31.9. Features consistent with (grade 2 diastolic dysfunction). -  Right ventricle: The ventricle was dilated. Systolic function was reduced. Estimated peak pressure 85-90 mmHg. -  Left atrium: The atrium was markedly dilated. -  Right atrium: The atrium was markedly dilated. -  Inferior vena cava, hepatic veins: The inferior vena cava was dilated. Respirophasic changes in dimension were absent. -  Aortic valve: A mechanical prosthesis was present. (2005). -  Mitral valve: Moderate annular calcification. Moderate regurgitation. -  Tricuspid valve: Moderate to severe regurgitation. -  Pulmonic valve: Mild to moderate regurgitation. CXR:   
8/8/19: Lobar atelectasis of the left lower lobe Ventilator Settings Mode FIO2 Rate Tidal Volume Pressure PEEP Pressure control  40 %    450 ml  14 cm H2O  8 cm H20 Peak airway pressure: 24.4 cm H2O Minute ventilation: 2.8 l/min ABG:  
Recent Labs 08/08/19 
0238 08/08/19 
9384 08/07/19 
1836 PHI 7.582* 7.566* 7.562* PCO2I 41.0 41.7 44.0 PO2I 75 72* 95  
HCO3I 38.6* 37.8* 39.5* LAB Recent Labs 08/07/19 
1000 GLUCPOC 117* Recent Labs 08/08/19 
0331 08/07/19 
8013 08/05/19 
1010 WBC 9.8 8.6  --   
HGB 8.9* 8.7*  --   
HCT 28.2* 29.8*  --   
 204  --   
INR 2.7 2.5 1.5* Recent Labs 08/08/19 
0331 08/07/19 
1108  140  
K 4.5 4.8  
CL 94* 98  
CO2 37* 34* GLU 97 92 BUN 76* 65* CREA 3.01* 2.87* CA 9.8 8.7 ALB  --  2.9*  
SGOT  --  835* No results for input(s): LCAD, LAC in the last 72 hours. Assessment:  (Medical Decision Making) Patient Active Problem List  
Diagnosis Code  Iron deficiency anemia due to chronic blood loss D50.0  MDS (myelodysplastic syndrome) (HCC) D46.9  CAD (coronary artery disease) I25.10  Chronic combined systolic and diastolic heart failure (HCC) I50.42  
  Essential hypertension, benign I10  
 Anticoagulated on Coumadin Z79.01  
 CKD (chronic kidney disease) stage 4, GFR 15-29 ml/min ([facility]) N18.4  COPD (chronic obstructive pulmonary disease) ([facility]) J44.9  REJI (obstructive sleep apnea)-cpap G47.33  
 ICD (implantable cardioverter-defibrillator) in place Z95.810  
 Osteopenia M85.80  
 HLD (hyperlipidemia) E78.5  Osteoarthritis M19.90  
 S/P AVR (aortic valve replacement) Z95.2  Cardiomyopathy ([facility] I42.9  Type 2 diabetes mellitus with nephropathy ([facility]) E11.21  
 Closed nondisplaced fracture of shaft of fifth metacarpal bone of left hand S62.357A  Physical debility R53.81  
 Encounter for immunization Z23  Anemia D64.9  Chronic respiratory failure with hypoxia ([facility]) J96.11  
 Debility R53.81  
 Acute exacerbation of CHF (congestive heart failure) ([facility]) I50.9  Systolic CHF, acute on chronic ([facility]) I50.23  
 Acute on chronic combined systolic and diastolic CHF (congestive heart failure) ([facility]) I50.43  
 Skin defect L98.9  Severe obesity ([facility]) E66.01  
 Excessive granulation tissue L92.9  Symptomatic anemia D64.9  Right shoulder pain M25.511  
 CHF exacerbation ([facility]) I50.9  Hypercapnic respiratory failure ([facility]) J96.92  
 Adenomatous duodenal polyp D13.2  Acute on chronic respiratory failure with hypoxia ([facility]) J96.21  
 Acute renal failure superimposed on chronic kidney disease ([facility]) N17.9, N18.9 Plan:  (Medical Decision Making) Hospital Problems  Date Reviewed: 8/7/2019 Codes Class Noted POA * (Principal) Acute on chronic respiratory failure with hypoxia ([facility]) ICD-10-CM: J96.21 
ICD-9-CM: 518.84, 799.02  8/7/2019 Yes Remains on vent support Acute renal failure superimposed on chronic kidney disease ([facility] ICD-10-CM: N17.9, N18.9 ICD-9-CM: 584.9, 585.9  8/8/2019 No  
 Creat up to 3.01 Severe obesity ([facility] ICD-10-CM: E66.01 
ICD-9-CM: 278.01  1/15/2019 Yes  
 chronic Acute exacerbation of CHF (congestive heart failure) (HCC) ICD-10-CM: I50.9 ICD-9-CM: 428.0  11/14/2018 Yes Continue Lasix--creat 3.01 Debility ICD-10-CM: R53.81 ICD-9-CM: 799.3  9/8/2018 Yes  
 chronic Chronic respiratory failure with hypoxia [facility]) ICD-10-CM: J96.11 
ICD-9-CM: 518.83, 799.02  9/7/2018 Yes  
 chronic Type 2 diabetes mellitus with nephropathy (HCC) (Chronic) ICD-10-CM: E11.21 
ICD-9-CM: 250.40, 583.81  12/18/2017 Yes  
 chronic S/P AVR (aortic valve replacement) (Chronic) ICD-10-CM: Z95.2 ICD-9-CM: V43.3  Unknown Yes Overview Signed 2/23/2016 11:04 AM by Tino Louise [employer] 
  
  
 chronic Cardiomyopathy ([address] (Chronic) ICD-10-CM: I42.9 ICD-9-CM: 425.4  Unknown Yes  
 chronic  
 ICD (implantable cardioverter-defibrillator) in place ICD-10-CM: Z95.810 ICD-9-CM: V45.02  10/2/2014 Yes Overview Signed 10/2/2014  4:58 PM by Nico Ritchieroniblaise single-chamber ICD implantation 10/20/14 
  
  
 chronic COPD (chronic obstructive pulmonary disease) (HCC) (Chronic) ICD-10-CM: J44.9 ICD-9-CM: 415  4/2/2014 Yes Overview Signed 10/6/2018  8:56 PM by Baldo Yeboah NP  
  HOME OXYGEN 3 LITERS 
  
  
 chronic REJI (obstructive sleep apnea)-cpap (Chronic) ICD-10-CM: G47.33 
ICD-9-CM: 327.23  4/2/2014 Yes Overview Signed 8/7/2019 12:50 PM by Roddy Haynes NP Home Trilogy 
  
  
 needs CKD (chronic kidney disease) stage 4, GFR 15-29 ml/min ([facility]) ICD-10-CM: N18.4 ICD-9-CM: 585.4  7/10/2013 Yes Chronic--follow --Lasix 40 mg IV q12h-urine output 3.8L, creat up to 3.01--continue Lasix --Blood cultures:  Pending 
--INR 2.7--Pharmacy to dose Coumadin --Stop Diprivan for possible extubation today 
--Palliative Care following--DNR status --Nasally intubated and will need to change to oral ETT if cannot extubate 
--Breathe Medical to check on Trilogy malfunction--constant alarming. Chronic Trilogy settings AVAP 550, 15/30, peep 9, 16 gradient. More than 50% of the time documented was spent in face-to-face contact with the patient and in the care of the patient on the floor/unit where the patient is located. Juliane Claudio NP I have spoken with and examined the patient. I agree with the above assessment and plan as documented. Mrs. Leo Reeves is awake, alert and experiencing periodic apnea. She  
 
Gen: awake, following commands Lungs:  CTAB Heart:  RRR with no Murmur/Rubs/Gallops Abd: +BS Ext: no c/c/e 
 
--Check BNP, LFTs this afternoon. --resume home dose lasix and f/u labs 
--extubate and use Trilogy today. DNR henceforth. --family updated.  
 
Charli Benites MD

## 2019-08-08 NOTE — PROGRESS NOTES
Ventilator check complete; patient has a #7. 0 ET tube secured at the 26 at the Left Nare. Patient is not sedated. Patient is able to follow commands. Breath sounds are clear. Trachea is midline, Negative for subcutaneous air, and chest excursion is symmetric. Patient is also Negative for cyanosis and is Negative for pitting edema. All alarms are set and audible. Resuscitation bag is at the head of the bed. Ventilator Settings Mode FIO2 Rate Tidal Volume Pressure PEEP I:E Ratio Pressure support  30 %    450 ml  18 cm H2O  8 cm H20  1:6.67 Peak airway pressure: 26.5 cm H2O Minute ventilation: 3.8 l/min ABG: Results for Toshia Ledbetter (MRN [id number]) as of 8/8/2019 07:46 
 8/8/2019 07:19  
pH (POC) 7.535 (H)  
pCO2 (POC) 50.7 (H)  
pO2 (POC) 114 (H) HCO3 (POC) 42.8 (H)  
sO2 (POC) 99 (H) Base excess (POC) 18 FIO2 (POC) 40 Patient temp. 98.6 Specimen type (POC) ARTERIAL Set Rate 8 Site LEFT RADIAL Device: VENT Mode ASSIST CONTROL Genetta Silence

## 2019-08-08 NOTE — PROGRESS NOTES
Patient extubated to a home Trillagy Ventilator with a 4L bleed in. Patient is able to communicate and is negative for stridor. Breath sounds are clear. No complications with extubation. Genetta Silence

## 2019-08-08 NOTE — PROGRESS NOTES
A follow up visit was made to the patient. Emotional support, spiritual presence and  
prayer were provided. Her sister, Matra Bower was present. Jeremiah Duffy

## 2019-08-08 NOTE — PROGRESS NOTES
Warfarin dosing per pharmacist 
 
Dulce Maria Mendez Melva Mckay is a 68 y.o. female. Height: 5' 2\" (157.5 cm)    Weight: 98.8 kg (217 lb 13 oz) Indication:  Mechanical AVR Goal INR:  2-3 Home dose:  4 mg hs Risk factors or significant drug interactions:  -- Other anticoagulants:  --- Daily Monitoring Date  INR     Warfarin dose HGB              Notes 8/7  2.5  ---  8.7 
8/8  2.7  1 mg  8.9 Pharmacy consulted to assist in patient's warfarin dosing. Pt was just recently admitted and discharged on 4 mg hs - but INR is elevated this AM and patient did not receive dose last evening. Will conservatively dose with 1 mg tonight and monitor INR daily. Thank you, Lizy Medellin, PharmD Clinical Pharmacist 
[phone]

## 2019-08-08 NOTE — PROGRESS NOTES
Interdisciplinary team rounds were held 8/8/2019 with the following team members:Nursing, Nurse Practitioner, Nutrition, Palliative Care, Pastoral Care, Physician, Respiratory Therapy and Clinical Coordinator and the patient. Plan of care discussed. See clinical pathway and/or care plan for interventions and desired outcomes.

## 2019-08-08 NOTE — PROGRESS NOTES
08/07/19 1912 Patient Observations Pulse (Heart Rate) 78 Resp Rate 18  
O2 Sat (%) 96 % Airway - Endotracheal Tube 08/07/19 Nare, left Placement Date/Time: 08/07/19 0940   Inserted By: EMS  Present on Admission/Arrival: Yes  Location: Nare, left  Placement Verified: Auscultation;BBS;EtCO2  Airway Types: Endotracheal, cuffed  Airway Tube Size: 7 mm Insertion Depth (cm) 26 cm Line Reza Nare Side Secured Left;Taped Site Assessment Clean, dry, & intact Respiratory Respiratory (WDL) X Patient on Vent Yes - If patient is on vent, add Doc Flowsheet Ventilator (). Respiratory Pattern Regular Chest/Tracheal Assessment Chest expansion, symmetrical  
Breath Sounds Bilateral Coarse Cough Cough with suction Airway Clearance Suction Nasotracheal  
Suction Device Inline suction catheter Sputum Method Obtained Nasal tracheal  
Sputum Amount Small Sputum Color/Odor Jacqui Dragon Sputum Consistency Thick Vent Settings FIO2 (%) 40 % CMV Rate Set 18 PC Set 26 PEEP/VENT (cm H2O) 8 cm H20  
I:E Ratio 1:2.86 Insp Time (sec) 0.87 sec Insp Rise Time (sec) 0.17 Flow Trigger 2 Ventilator Measurements Resp Rate Observed 18 Vt Exhaled (Machine Breath) (ml) 386 ml Ve Observed (l/min) 7.1 l/min PIP Observed (cm H2O) 34 cm H2O  
MAP (cm H2O) 14 I:E Ratio Actual 1:2.86 Auto PEEP Observed (cm H2O) 0 cm H2O Dynamic Compliance (ml/cm H20) 15.5 ml/cm H20 Safety & Alarms Backup Mode Checked/Apnea Yes Pressure Max 50 cm H2O Pressure Min 2 cm H2O Ve Min 4 Ve Max 15 RR Min 5  
RR Max 40 Ambu Bag Yes Ambu Mask Yes Vent Method/Mode Ventilation Method Conventional  
Ventilator Mode Pressure control Ventilator Mode ID 2 Procedures  
$$ Subsequent Procedure Aerosol Delivery Source In-line nebulizer Pulmonary Toilet Pulmonary Toilet H. O.B elevated;Suction Ambu bag and mask at head of bed. Raad Samano Dec, RT

## 2019-08-08 NOTE — PROGRESS NOTES
Palliative Care Progress Note Patient: Ermias Espinoza MRN: [id number]  SSN: [id number] YOB: 1950  Age: 68 y.o. Sex: female Assessment/Plan: Chief Complaint/Interval History: pt alert on vent this am.  Notes throat pain. Middle daughter at bedside. Plans to extubate this am per pulm Principal Diagnosis: · Respiratory Failure, Acute on Chronic  J96.20 Additional Diagnoses: · Debility, Unspecified  R53.81 · Dyspnea  R06.00 
· Frailty  R54 
· Encounter for Palliative Care  Z51.5 Palliative Performance Scale (PPS) PPS: 30 Medical Decision Making:  
Reviewed and summarized labs and imaging. I met with pt and daughter at bedside. Feeling well overall. Daughter notes plans to return home with CLTC aid and home palliative service. pulm with plans to extubate this am.  Pt home trilogy was malfunctioning and is being evaluated while inpatient. Hope it to return home with properly functioning equipment. Will discuss findings with members of the interdisciplinary team.   
 
More than 50% of this 20 minute visit was spent counseling and coordination of care as outlined above. Subjective:  
 
Review of Systems negative with the exception of as noted above Objective:  
 
Visit Vitals /89 Pulse 84 Temp 99 °F (37.2 °C) Resp 9 Ht 5' 2\" (1.575 m) Wt 217 lb 13 oz (98.8 kg) SpO2 97% BMI 39.84 kg/m² Physical Exam: 
 
General:  Cooperative. No acute distress. Eyes:  Conjunctivae/corneas clear Nose: Nares normal. Septum midline. Neck: Supple, symmetrical, trachea midline, no JVD Lungs:   Clear to auscultation bilaterally, unlabored Heart:  Regular rate and rhythm, no murmur Abdomen:   Soft, non-tender, non-distended Extremities: Normal, atraumatic, no cyanosis or edema Skin: Skin color, texture, turgor normal. No rash or lesions. Neurologic: Nonfocal  
Psych: Alert and oriented Signed By: Halley Garcia MD   
 August 8, 2019

## 2019-08-08 NOTE — PROGRESS NOTES
To see pt and chart reviewed. No family currently at bedside. Pt now extubated to home trilogy machine. Spoke with daughter, Carolina Ahuja (POA-on file). Confirms demographics. Pt does live alone currently. Daughter states they may have to re-evaluate this. Pt current with , not sure about [facility] Palliative care (daughter did not say they were active) but only been once per daughter prior to pt getting re-admitted. Pt is followed by Health  and ambulatory CM per notes. Guillermina Grovebs here to switch out trilogy machine and make sure working properly. CM will continue to follow to assist with other d/c needs. Daughter would like to return home with [facility] if pt safe. CM will follow. Care Management Interventions PCP Verified by CM: Yes(confirms Dr. Garrett Mathew) Mode of Transport at Discharge: Other (see comment) Transition of Care Consult (CM Consult): Home Health([facility]) Discharge Durable Medical Equipment: (w/c, Rolator, Raised toilet, O2, home trilogy with 1 Medical Devorah Ordonez) Current Support Network: Own Home, Lives Alone(daughter - Teodoro -HCPOA/POA - on file -[phone]) Confirm Follow Up Transport: Family Plan discussed with Pt/Family/Caregiver: Yes Freedom of Choice Offered: Yes [employer] Information Provided?: (confirms MCR/SAMANTA-able to get rx) Discharge Location Discharge Placement: Home with home health

## 2019-08-08 NOTE — PROGRESS NOTES
Bedside shift report received from [facility]. Patient resting comfortably on vent following commands.

## 2019-08-09 NOTE — CONSULTS
[facility] Consult Date of  Admission: 8/7/2019  9:57 AM  
 
Primary Care Physician: Dr. Pamela Cruz Primary Cardiologist: Dr. Sweetie Cortez Referring Physician: Art Raymundo NP Consulting Physician: Dr. Dylan Nails 
 
CC/Reason for consult: NSVT Jassi Garibay is a 68 y.o. female with past medical history of chronic respiratory failure on home trilogy vent at night, chronic sHF with ICD in place, CKD stage 4, COPD and DM2 who presented to the ER with respiratory distress. Associated symptoms included peripheral edema. EMS was summoned the patients home and she was intubated nasally due to O2 sats in the 50s. Patient was admitted to ICU by critical care team for continued management. She is now extubated and on NC. Today, she was noted to have a run of NSVT on the monitor. We were asked to see patient in consultation. Patient denies chest pain, palpitations, ICD shocks, or dizziness. States that her breathing has improved after diuresis. Currently on IV lasix and Coreg. No ACEi due to renal failure. Patient Active Problem List  
Diagnosis Code  Iron deficiency anemia due to chronic blood loss D50.0  MDS (myelodysplastic syndrome) ([facility]) D46.9  CAD (coronary artery disease) I25.10  Chronic combined systolic and diastolic heart failure ([facility]) I50.42  
 Essential hypertension, benign I10  
 Anticoagulated on Coumadin Z79.01  
 CKD (chronic kidney disease) stage 4, GFR 15-29 ml/min ([facility]) N18.4  COPD (chronic obstructive pulmonary disease) ([facility]) J44.9  REJI (obstructive sleep apnea)-cpap G47.33  
 ICD (implantable cardioverter-defibrillator) in place Z95.810  
 Osteopenia M85.80  
 HLD (hyperlipidemia) E78.5  Osteoarthritis M19.90  
 S/P AVR (aortic valve replacement) Z95.2  Cardiomyopathy ([address] I42.9  Type 2 diabetes mellitus with nephropathy ([facility]) E11.21  
 Closed nondisplaced fracture of shaft of fifth metacarpal bone of left hand S62.357A  Physical debility R53.81  
 Encounter for immunization Z23  Anemia D64.9  Chronic respiratory failure with hypoxia ([facility]) J96.11  
 Debility R53.81  
 Acute exacerbation of CHF (congestive heart failure) ([facility]) I50.9  Systolic CHF, acute on chronic ([facility]) I50.23  
 Acute on chronic combined systolic and diastolic CHF (congestive heart failure) ([facility]) I50.43  
 Skin defect L98.9  Severe obesity ([facility]) E66.01  
 Excessive granulation tissue L92.9  Symptomatic anemia D64.9  Right shoulder pain M25.511  
 CHF exacerbation ([facility]) I50.9  Hypercapnic respiratory failure ([facility]) J96.92  
 Adenomatous duodenal polyp D13.2  Acute on chronic respiratory failure with hypoxia ([facility]) J96.21  
 Acute renal failure superimposed on chronic kidney disease ([facility]) N17.9, N18.9 Past Medical History:  
Diagnosis Date  Anticoagulated on Coumadin 7/9/2013 S/P AVR  CAD (coronary artery disease) 1/20/2013 5/8/14 PCI LAD with stent placed  Cardiomyopathy ([facility] [address]  CHF (congestive heart failure) ([facility] [address] 2/17/2017  CKD (chronic kidney disease) stage 3, GFR 30-59 ml/min ([facility]) 7/10/2013  COPD (chronic obstructive pulmonary disease) ([facility] [address] 4/2/2014  Diabetes ([address]  Essential hypertension, benign 1/20/2013  HLD (hyperlipidemia)  ICD (implantable cardioverter-defibrillator) in place 10/2/2014 Biotronik single-chamber ICD implantation 10/20/14  Iron deficiency anemia due to chronic blood loss 7/29/2009  MDS (myelodysplastic syndrome) ([address] 12/17/2011 Procrit started in August, 2011 12/18/11 Procrit weekly and Iron stores. 5-12 12-13-12 good response to 3 weekly procrit did not need it last time  3-7-13 Pt doing well. Just wanted a \"check-up. \" Responding to Procrit every three weeks. 8-29-13 patient has missed some injections on recently restarted hemoglobin only issue , takes oral iron iron stores each time  REJI (obstructive sleep apnea)-cpap 4/2/2014  Osteoarthritis  Osteopenia  Pulmonary hypertension ([facility] [address] 6/15/2016  Quadrantanopia  Skin defect 2018  Visual complaint 2015 Past Surgical History:  
Procedure Laterality Date [address]    COLONOSCOPY N/A 2019 COLONOSCOPY/ BMI 40 ROOM 637 performed by Juanita Nicholson MD at [address] [facility]  
 mechanical valve   HX  SECTION    
 HX CORONARY STENT PLACEMENT  May, 2014 STEMI with Stent placement.  HX ENDOSCOPY  2009 EGD [address] CATHETERIZATION    HX PACEMAKER  10/2/2014 Biotronik ICD  HX TUBAL LIGATION    
 IR BX BONE MARROW DIAGNOSTIC  2011 Allergies Allergen Reactions  Ferrlecit [Sodium Ferric Gluconat-Sucrose] Other (comments) Chest pain, reported MI after administration  Sulfa (Sulfonamide Antibiotics) Hives  Aspirin Nausea Only Cannot take uncoated aspirin Family History Problem Relation Age of Onset  Heart Disease Mother CHF  Hypertension Mother  Kidney Disease Mother  Heart Disease Father CHF  Lung Disease Father  Diabetes Father  Cancer Father   
     prostate  Hypertension Father  Heart Attack Father  Heart Disease Maternal Aunt  Diabetes Brother  Coronary Artery Disease Other  Breast Cancer Neg Hx Current Facility-Administered Medications Medication Dose Route Frequency  furosemide (LASIX) injection 40 mg  40 mg IntraVENous BID  warfarin on hold per pharmacy  1 mg Oral See Admin Instructions  lidocaine 4 % patch 1 Patch  1 Patch TransDERmal Q24H  carvedilol (COREG) tablet 12.5 mg  12.5 mg Oral BID WITH MEALS  sodium chloride (NS) flush 5-40 mL  5-40 mL IntraVENous Q8H  
 sodium chloride (NS) flush 5-40 mL  5-40 mL IntraVENous PRN  
 LORazepam (ATIVAN) injection 2 mg  2 mg IntraVENous Q4H PRN  
  albuterol (PROVENTIL VENTOLIN) nebulizer solution 2.5 mg  2.5 mg Nebulization Q4H RT  
 aspirin delayed-release tablet 81 mg  81 mg Oral DAILY  docusate sodium (COLACE) capsule 100 mg  100 mg Oral DAILY  pantoprazole (PROTONIX) tablet 40 mg  40 mg Oral DAILY  sertraline (ZOLOFT) tablet 50 mg  50 mg Oral DAILY  traZODone (DESYREL) tablet 50 mg  50 mg Oral QHS PRN  
 ondansetron (ZOFRAN) injection 4 mg  4 mg IntraVENous Q4H PRN Review of Systems Constitutional: Negative. HENT: Negative. Eyes: Negative. Respiratory: Positive for shortness of breath. Cardiovascular: Positive for leg swelling. Gastrointestinal: Negative. Genitourinary: Negative. Musculoskeletal: Negative. Skin: Negative. Neurological: Negative. Endo/Heme/Allergies: Negative. Psychiatric/Behavioral: Negative. Physical Exam 
Vitals:  
 08/09/19 1008 08/09/19 1030 08/09/19 1137 08/09/19 1417 BP:  132/70 Pulse:  68 Resp:  22 Temp:   98.4 °F (36.9 °C) SpO2: 100% 94%  99% Weight:      
Height:      
 
 
Physical Exam: 
Physical Exam  
Constitutional: She is oriented to person, place, and time and well-developed, well-nourished, and in no distress. Neck: No JVD present. Cardiovascular: Normal rate and regular rhythm. Pulmonary/Chest: Effort normal and breath sounds normal.  
Abdominal: Soft. Bowel sounds are normal.  
Musculoskeletal: Normal range of motion. She exhibits no edema. Neurological: She is alert and oriented to person, place, and time. Skin: Skin is warm and dry. Psychiatric: Affect normal.  
 
 
Cardiographics Telemetry: normal sinus rhythm ECG: normal EKG, normal sinus rhythm Echocardiogram: from 7/29/19 
-  Left ventricle: The ventricle was moderately dilated. Systolic function was markedly reduced. Ejection fraction was estimated in the range of 20 % to 25 %. There was severe diffuse hypokinesis.  Wall thickness was mildly increased. Avg. E/e'= 31.9. Features were consistent with (grade 2 diastolic dysfunction). -  Right ventricle: The ventricle was dilated. Systolic function was reduced. Estimated peak pressure was in the range of 85-90 mmHg. -  Left atrium: The atrium was markedly dilated. -  Right atrium: The atrium was markedly dilated. -  Inferior vena cava, hepatic veins: The inferior vena cava was dilated. Respirophasic changes in dimension were absent. -  Aortic valve: A mechanical prosthesis was present. (2005). -  Mitral valve: There was moderate annular calcification. There was moderate regurgitation. -  Tricuspid valve: There was moderate to severe regurgitation. -  Pulmonic valve: There was mild to moderate regurgitation. Labs:  
Recent Labs 08/09/19 
1350 08/09/19 
0339  08/08/19 
9851  143   < > 140  
K 3.9 4.6   < > 4.5 BUN 69* 74*   < > 76* CREA 2.71* 2.87*   < > 3.01* * 90   < > 97 WBC  --  7.1  --  9.8 HGB  --  8.1*  --  8.9* HCT  --  26.6*  --  28.2*  
PLT  --  169  --  179 INR  --  2.9  --  2.7  
 < > = values in this interval not displayed. Assessment/Plan: 
 
 Assessment:  
  
  Acute on chronic respiratory failure with hypoxia -- per primary team 
 
  CKD (chronic kidney disease) stage 4, GFR 15-29 ml/min -- stable. Continue to monitor while getting IV lasix. Recommend BID Lasix dosing on discharge COPD (chronic obstructive pulmonary disease) -- per primary team 
    Overview: HOME OXYGEN 3 LITERS 
 
  ICD (implantable cardioverter-defibrillator) in place -- interrogation today shows normal function. Adjustments made to VT1 zone to allow monitoring of slow VTACH. Overview: Biotronik single-chamber ICD implantation 10/20/14 S/P AVR (aortic valve replacement) -- on warfarin Overview: Mechanical/Artific Type 2 diabetes mellitus with nephropathy -- per primary Chronic respiratory failure with hypoxia -- per primary Debility Acute exacerbation of CHF (congestive heart failure) -- on IV lasix. EF 20-25% by last echocardiogram. Increase Coreg dose. Recommend BID Lasix dose at home. No ACEi/ARB due to worsening renal failure. Severe obesity Acute renal failure superimposed on chronic kidney disease -- monitor. Improved. NSVT -- increase Coreg dose to 12.5mg BID. Continue to monitor. Thank you very much for this referral. We appreciate the opportunity to participate in this patient's care. We will follow along with above stated plan. Edgar Crawford NP Consulting MD: Robert Guillen

## 2019-08-09 NOTE — PROGRESS NOTES
Palliative Care Progress Note Patient: Jake Ocampo MRN: [id number]  SSN: [id number] YOB: 1950  Age: 68 y.o. Sex: female Assessment/Plan: Chief Complaint/Interval History: pt alert. Extubated to trilogy yesterday. Feels breathing has improved. Notes rt shoulder pain Principal Diagnosis: · Respiratory Failure, Acute on Chronic  J96.20 Additional Diagnoses: · Debility, Unspecified  R53.81 · Dyspnea  R06.00 
· Frailty  R54 
· Encounter for Palliative Care  Z51.5 Palliative Performance Scale (PPS) PPS: 30 Medical Decision Making:  
Reviewed and summarized labs and imaging. I met with pt at bedside. Pt in good spirits. Feels breathing is improving. We discussed plan to stabilize pt on equipment she is to have on discharge. Pt expressed appreciation of ongoing care. Restarted home lidoderm patch to rt shoulder q daily Will sign off. Plan is to return home with aid and home based palliative care Will discuss findings with members of the interdisciplinary team.   
 
More than 50% of this 20 minute visit was spent counseling and coordination of care as outlined above. Subjective:  
 
Review of Systems negative with the exception of as noted above Objective:  
 
Visit Vitals /53 Pulse 72 Temp 98.4 °F (36.9 °C) Resp 21 Ht 5' 2\" (1.575 m) Wt 212 lb 1.3 oz (96.2 kg) SpO2 100% BMI 38.79 kg/m² Physical Exam: 
 
General:  Cooperative. No acute distress. Eyes:  Conjunctivae/corneas clear Nose: Nares normal. Septum midline. Neck: Supple, symmetrical, trachea midline, no JVD Lungs:   Clear to auscultation bilaterally, unlabored Heart:  Regular rate and rhythm, no murmur Abdomen:   Soft, non-tender, non-distended Extremities: Normal, atraumatic, no cyanosis or edema Skin: Skin color, texture, turgor normal. No rash or lesions. Neurologic: Nonfocal  
Psych: Alert and oriented Signed By: Chilango Arora MD   
 August 9, 2019

## 2019-08-09 NOTE — PROGRESS NOTES
Odalis Wakefield, advisor with [facility] stopped by unit today. According to Lalito Ellsworth, pt is under their services. Attempted to call Lalito Ellsworth, [phone], but unable to leave voice message. CM following.

## 2019-08-09 NOTE — PROGRESS NOTES
Interdisciplinary team rounds were held 8/9/2019 with the following team members:Care Management, Nursing, Nurse Practitioner, Nutrition, Palliative Care, Pastoral Care, Pharmacy, Physical Therapy, Physician, Respiratory Therapy and Clinical Coordinator and the patient. Plan of care discussed. See clinical pathway and/or care plan for interventions and desired outcomes.

## 2019-08-09 NOTE — Clinical Note
I knew they hadn't dropped the ball. :)Kasia Manual agrees this patient needs hospice and will discuss with IP CM. They have a program that will pay for in home caregivers since she will lose her CLTC aids if she goes hospice.

## 2019-08-09 NOTE — PROGRESS NOTES
Pt 02 levels dropping to mid 70's. Good waveform, pulse ox replaced and adjusted with no improvement. Called RT to bedside, attempting to increase bled in 02 with no change in [place]. Pt taken off home triology and placed on 4L NC. Pt now 99% on monitor. Will pass along to dayshift need for further function testing of home triology.

## 2019-08-09 NOTE — PROGRESS NOTES
Spoke with Daughter, Ean Mccloud [facility]). She does want [facility] involved. Marko called with [facility] and notified of daughters wishes and possible home needs. She will reach out to daughter. They have been involved, but pt did not want them speaking to anyone but her. Ackward situation per Marko.

## 2019-08-09 NOTE — PROGRESS NOTES
A follow up visit was made to the patient. Emotional support, spiritual presence and  
prayer were provided. Dharmesh Duffy

## 2019-08-09 NOTE — PROGRESS NOTES
PT Note: 
 
Participated in interdisciplinary rounds in ICU/CCU and chart reviewed. Patient is experiencing decrease in function from baseline. Patient would benefit from skilled acute therapy to increase independence with self care/ADLs, strength, endurance, and functional mobility. Recommend PT/OT consult when medically stable and MD agrees. Thank you for your consideration, 
Morenita Thompson, SPT Ochoa Goldstein, PT, DPT

## 2019-08-09 NOTE — PROGRESS NOTES
Critical Care Daily Progress Note: 8/9/2019 [address] Admission Date: 8/7/2019 The patient's chart is reviewed and the patient is discussed with the staff. 68 y.o. AAF with chronic respiratory failure on 3lpm & nocturnal Trilogy who presents with respiratory distress.  Was found by family members \"not looking good\", swollen and EMS was called. A DNR was  in place but family felt she was \"suffering\" , O2 sat 35% and they allowed nasal intubation.  Received Versed in route and placed on Versed drip.   Was discharged home Friday after syncopal type episode. Cely Ashtonils been diagnosed with small bowel mass, Heparin bridge for AVR and endoscopy was planned but cancelled per patient desires. Sakshi Prince is being admitted to the ICU on vent support for acute hypoxic respiratory failure. 
 Mobile with walker and overall debilitated.  She fell Monday and EMS was called to the home twice but did not feel she needed to be brought to hospital.  
 Chronic Medical:  Systolic/diastolic heart failure, CAD with previous stent, cardiomyopathy with EF 20-25%, AVR with mechanical valve in 2005 and on Coumadin, ICD, HTN, HLD, COPD, pulmonary hypertension, OA, DM, MDS, former tobacco abuse-quit 2014. Was extubated 8/8 to home Trilogy (new machine obtained from [employer]) Subjective:  
 
Resting in bed, states breathing is \"OK\" this morning. Has wet, nonproductive cough today. Desat to 70s at about 4AM--changed to NC 4L with sat up to 99%. Current Facility-Administered Medications Medication Dose Route Frequency  warfarin (COUMADIN) tablet 1 mg  1 mg Oral QPM  
 sodium chloride (NS) flush 5-40 mL  5-40 mL IntraVENous Q8H  
 sodium chloride (NS) flush 5-40 mL  5-40 mL IntraVENous PRN  
 LORazepam (ATIVAN) injection 2 mg  2 mg IntraVENous Q4H PRN  
 albuterol (PROVENTIL VENTOLIN) nebulizer solution 2.5 mg  2.5 mg Nebulization Q4H RT  
  aspirin delayed-release tablet 81 mg  81 mg Oral DAILY  carvedilol (COREG) tablet 6.25 mg  6.25 mg Oral BID WITH MEALS  docusate sodium (COLACE) capsule 100 mg  100 mg Oral DAILY  lidocaine 4 % patch 1 Patch  1 Patch TransDERmal Q24H  pantoprazole (PROTONIX) tablet 40 mg  40 mg Oral DAILY  sertraline (ZOLOFT) tablet 50 mg  50 mg Oral DAILY  traZODone (DESYREL) tablet 50 mg  50 mg Oral QHS PRN  
 ondansetron (ZOFRAN) injection 4 mg  4 mg IntraVENous Q4H PRN Review of Systems Constitutional:  negative for fever, chills, sweats Cardiovascular:  Trace LE edema, negative for chest pain, palpitations, syncope Gastrointestinal:  negative for dysphagia, reflux, vomiting, diarrhea, abdominal pain, or melena Neurologic:  negative for focal weakness, numbness, headache Objective:  
 
Vitals:  
 08/09/19 0459 08/09/19 0530 08/09/19 0559 08/09/19 0630 BP: 140/64 132/61 133/74 130/61 Pulse: 66 64 66 70 Resp: 20 20 18 19 Temp:      
SpO2: 98% 96% 95% 94% Weight:      
Height:      
 
 
 
Intake/Output Summary (Last 24 hours) at 8/9/2019 1066 Last data filed at 8/8/2019 1725 Gross per 24 hour Intake 367.28 ml Output 1650 ml Net -1282.72 ml Physical Exam:         
Constitutional:  the patient is obese and in no acute distress, NC 4L sat 93% EENMT:  Sclera clear, pupils equal, oral mucosa moist 
Respiratory: wet cough, nonproductive, diminished in bases, rare wheeze Cardiovascular:  RRR without M,G,R 
Gastrointestinal: soft and non-tender; with positive bowel sounds. Musculoskeletal: warm without cyanosis. There is trace lower extremity edema. Skin:  no jaundice or rashes, no wounds Neurologic: no gross neuro deficits Psychiatric:  alert and oriented x 3 LINES:   
PIV sites Ramirez 8/7/19 DRIPS:   None CXR:  
8/9/19:  Left lower lobe lobar atelectasis unchanged. Cardiomegaly unchanged. LAB Recent Labs 08/07/19 
1000 GLUCPOC 117* Recent Labs 08/09/19 
4492 08/08/19 
4061 08/07/19 
2627 WBC 7.1 9.8 8.6 HGB 8.1* 8.9* 8.7* HCT 26.6* 28.2* 29.8*  
 179 204 INR 2.9 2.7 2.5 Recent Labs 08/09/19 
2479 08/08/19 
1441 08/08/19 
0331 08/07/19 
1108  141 140 140  
K 4.6 4.3 4.5 4.8  
CL 96* 95* 94* 98  
CO2 39* 38* 37* 34* GLU 90 131* 97 92 BUN 74* 77* 76* 65* CREA 2.87* 3.14* 3.01* 2.87* CA 9.6 9.9 9.8 8.7 ALB 2.8* 3.3  --  2.9* TBILI 1.1 1.3*  --  1.1 ALT 1,007* 1,153*  --  532* SGOT 1,075* 1,487*  --  835* No results for input(s): LCAD, LAC in the last 72 hours. Recent Labs 08/08/19 
2205 08/08/19 
8090 08/08/19 
3606 PHI 7.535* 7.582* 7.566* PCO2I 50.7* 41.0 41.7 PO2I 114* 75 72* HCO3I 42.8* 38.6* 37.8* Assessment:  (Medical Decision Making) Patient Active Problem List  
Diagnosis Code  Iron deficiency anemia due to chronic blood loss D50.0  MDS (myelodysplastic syndrome) ([facility]) D46.9  CAD (coronary artery disease) I25.10  Chronic combined systolic and diastolic heart failure ([facility]) I50.42  
 Essential hypertension, benign I10  
 Anticoagulated on Coumadin Z79.01  
 CKD (chronic kidney disease) stage 4, GFR 15-29 ml/min ([facility]) N18.4  COPD (chronic obstructive pulmonary disease) ([facility]) J44.9  REJI (obstructive sleep apnea)-cpap G47.33  
 ICD (implantable cardioverter-defibrillator) in place Z95.810  
 Osteopenia M85.80  
 HLD (hyperlipidemia) E78.5  Osteoarthritis M19.90  
 S/P AVR (aortic valve replacement) Z95.2  Cardiomyopathy ([facility] [address] I42.9  Type 2 diabetes mellitus with nephropathy ([facility]) E11.21  
 Closed nondisplaced fracture of shaft of fifth metacarpal bone of left hand S62.357A  Physical debility R53.81  
 Encounter for immunization Z23  Anemia D64.9  Chronic respiratory failure with hypoxia ([facility]) J96.11  
 Debility R53.81  
 Acute exacerbation of CHF (congestive heart failure) ([facility]) I50.9  Systolic CHF, acute on chronic (HCC) I50.23  
 Acute on chronic combined systolic and diastolic CHF (congestive heart failure) (HCC) I50.43  
 Skin defect L98.9  Severe obesity (HCC) E66.01  
 Excessive granulation tissue L92.9  Symptomatic anemia D64.9  Right shoulder pain M25.511  
 CHF exacerbation (HCC) I50.9  Hypercapnic respiratory failure (HCC) J96.92  
 Adenomatous duodenal polyp D13.2  Acute on chronic respiratory failure with hypoxia (HCC) J96.21  
 Acute renal failure superimposed on chronic kidney disease (HCC) N17.9, N18.9 Plan:  (Medical Decision Making) Hospital Problems  Date Reviewed: 8/9/2019 Codes Class Noted POA * (Principal) Acute on chronic respiratory failure with hypoxia (HCC) ICD-10-CM: J96.21 
ICD-9-CM: 518.84, 799.02  8/7/2019 Yes Used Trilogy last night but became hypoxic and changed to NC--DME to eval  
 Acute renal failure superimposed on chronic kidney disease ([facility] [address] ICD-10-CM: N17.9, N18.9 ICD-9-CM: 584.9, 585.9  8/8/2019 No  
 creatinine Severe obesity ([facility] [address] ICD-10-CM: E66.01 
ICD-9-CM: 278.01  1/15/2019 Yes  
 chronic Acute exacerbation of CHF (congestive heart failure) (HCC) ICD-10-CM: I50.9 ICD-9-CM: 428.0  11/14/2018 Yes Debility ICD-10-CM: R53.81 ICD-9-CM: 799.3  9/8/2018 Yes  
 chronic Chronic respiratory failure with hypoxia [facility]) ICD-10-CM: J96.11 
ICD-9-CM: 518.83, 799.02  9/7/2018 Yes  
 chronic Type 2 diabetes mellitus with nephropathy (HCC) (Chronic) ICD-10-CM: E11.21 
ICD-9-CM: 250.40, 583.81  12/18/2017 Yes Chronic--ranges  S/P AVR (aortic valve replacement) (Chronic) ICD-10-CM: Z95.2 ICD-9-CM: V43.3  Unknown Yes Overview Signed 2/23/2016 11:04 AM by Tino Louise [employer] Chronic--on Coumadin  
 Cardiomyopathy (HCC) (Chronic) ICD-10-CM: I42.9 ICD-9-CM: 425.4  Unknown Yes  
 chronic  
 ICD (implantable cardioverter-defibrillator) in place ICD-10-CM: Z95.810 
 ICD-9-CM: V45.02  10/2/2014 Yes Overview Signed 10/2/2014  4:58 PM by Criss Mead [employer] single-chamber ICD implantation 10/20/14 
  
  
 chronic COPD (chronic obstructive pulmonary disease) ([facility]) (Chronic) ICD-10-CM: J44.9 ICD-9-CM: 021  4/2/2014 Yes Overview Signed 10/6/2018  8:56 PM by Hernandez Saldivar NP  
  HOME OXYGEN 3 LITERS Chronic--rare wheeze REJI (obstructive sleep apnea)-cpap (Chronic) ICD-10-CM: G47.33 
ICD-9-CM: 327.23  4/2/2014 Yes Overview Signed 8/7/2019 12:50 PM by Umer Gilbert NP Home Trilogy DME to eval Trilogy today CKD (chronic kidney disease) stage 4, GFR 15-29 ml/min ([facility]) ICD-10-CM: N18.4 ICD-9-CM: 585.4  7/10/2013 Yes Creatinine down 2.87  
  
 
--Albuterol --Blood cultures:  Pending 
--INR 2.9--Pharmacy to dosing Coumadin--has mechanical AV 
--Hgb down to 8.1--follow --Lasix yesterday x1, creatinine today 2.87. Resume Lasix BID 
--BNP up to 1822 yesterday 
--LFTs slightly down 
--Palliative Care following--DNR status --Breathe Medical madeTrilogy changes today--will check ABG in AM 
 
More than 50% of the time documented was spent in face-to-face contact with the patient and in the care of the patient on the floor/unit where the patient is located. Carrington Oliva NP I have spoken with and examined the patient. I agree with the above assessment and plan as documented. Mrs. Sagrario Smiley is still having intermittent severe hypoxemia overnight despite new Trilogy machine. Gen: pleasant ,no distress on nasal cannula Lungs:  CTAB Heart:  RRR with no Murmur/Rubs/Gallops ABd:  +BS Ext: no edema 
 
--resume lasix 40mg IV q12 with close monitoring renal function 
--trend LFT 
--Appreciate palliative care input. --monitor on Trilogy tonight and consider transfer to floor if ABG in am is ok.  
 
Levonne Bunk, MD

## 2019-08-09 NOTE — PROGRESS NOTES
Patient had irregular heart rhythm with no symptoms noted by patient. VSS. Cardiology consulted and ICD interrogated. Cardiac rhythm strip on paper chart.

## 2019-08-09 NOTE — PROGRESS NOTES
Phone outreach to [facility], [address] [facility]. Luis Armando Whiteside reports she met with MsKimo Joe in her home this week; and had a NP scheduled to visit next week. Made Luis Armando Whiteside aware of Ms. Corral's readmit. She will visit patient IP. Note sent to [facility], Adan Harmon, MSN, RN, [place].

## 2019-08-09 NOTE — PROGRESS NOTES
Warfarin dosing per pharmacist 
 
Valorie Black Mariam Sever is a 68 y.o. female. Height: 5' 2\" (157.5 cm)    Weight: 96.2 kg (212 lb 1.3 oz) Indication:  Mechanical AVR Goal INR:  2-3 Home dose:  4 mg hs Risk factors or significant drug interactions:  -- Other anticoagulants:  --- Daily Monitoring Date  INR     Warfarin dose HGB              Notes 8/7  2.5  ---  8.7 
8/8  2.7  1 mg  8.9 
8/9  2.9  Hold  8.1 Pharmacy consulted to assist in patient's warfarin dosing. Pt was just recently admitted and discharged on 4 mg hs. INR up today only after administration of 1 mg last evening. Will hold tonight's dose and monitor INR. Thank you, Aaliyah Reyez, PharmD Clinical Pharmacist 
[phone]

## 2019-08-10 NOTE — PROGRESS NOTES
TRANSFER - OUT REPORT: 
 
Verbal report given to Danny(name) on  Company  being transferred to [facility](unit) for routine progression of care Report consisted of patients Situation, Background, Assessment and  
Recommendations(SBAR). Information from the following report(s) SBAR was reviewed with the receiving nurse. Opportunity for questions and clarification was provided. Patient transported with: 
 O2 @ 4 liters Registered Nurse Tech

## 2019-08-10 NOTE — PROGRESS NOTES
Pt admitted to room 829 from ICU via bed and transport. Assessment completed upon patients arrival to unit and care assumed. Respiration even and unlabored 20/min at rest. VSS. Afebrile at present. O2 sats 98% on 4 liters. Pt is alert and oriented, able to make needs known. Oriented to room and hospital protocols. Instructed to call for assistance with any needs. Verbalizes understanding. Dual full body skin assessment completed by this nurse and Rory Vaz. Skin intact to sacrum with allevyn in place for protection. Surgical scar to mid-chest, mid abdomen and left chest (pacemaker site) noted. Healing wound to left knee with Mepilex in place. Bed locked and lowered, call light in reach. On fall precaution. Tab alert on intact and patent. Denies needs at present. purewick catheter CDI draining yellow urine. HOB up. No family at bedside. RT aware of need to place continuous O2 monitor. Door open. Will monitor closely.

## 2019-08-10 NOTE — PROGRESS NOTES
Problem: Mobility Impaired (Adult and Pediatric) Goal: *Acute Goals and Plan of Care (Insert Text) Description LTG: 
(1.)Ms. Diaz will move from supine to sit and sit to supine, scoot up and down and roll side to side in bed with INDEPENDENT within 7 day(s). (2.)Ms. Diaz will transfer from bed to chair and chair to bed with modified INDEPENDENT using the least restrictive device within 7 day(s). (3.)Ms. Diaz will ambulate with modified INDEPENDENCE for 50+ feet with the least restrictive device within 7 day(s). (4.)Ms. Diaz will perform standing static and dynamic balance activities x 8 minutes with SUPERVISION to improve safety within 7 day(s). (5.)Ms. Diaz will ascend and descend 4 stairs using one hand rail(s) with CGA to improve functional mobility and safety within 7 day(s). Outcome: Progressing Towards Goal 
  
PHYSICAL THERAPY: Initial Assessment 8/10/2019 INPATIENT:   
Leg[place] / [place] / Product Type: Medicare /   
  
NAME/AGE/GENDER: Shy Keyes is a 68 y.o. female PRIMARY DIAGNOSIS: Acute on chronic respiratory failure with hypoxia (HCC) [J96.21] Acute on chronic respiratory failure with hypoxia (HCC) Acute on chronic respiratory failure with hypoxia (HCC) ICD-10: Treatment Diagnosis:  
 Other abnormalities of gait and mobility (R26.89) Repeated Falls (R29.6) Precaution/Allergies: 
Ferrlecit [sodium ferric gluconat-sucrose]; Sulfa (sulfonamide antibiotics); and Aspirin ASSESSMENT:  
 
Ms. Diaz presents in ICU on 4L O2 via nasal cannula. She was pleasant and agreeable for PT assessment. Patient has a preexisting R shoulder injury and she has little use of her R UE. She stood with CGA and ambulated 3' at EOB with RW and min A.   Sit to supine with min A.  SpO2 remained in 90's except for brief drop to upper 80's after ambulation. With rest and breathing, sat quickly recovered. Patient is modified independent with functional mobility at baseline. Ms. Crystal Arvizu would benefit from skilled physical therapy (medically necessary) to address her deficits and maximize her function. This section established at most recent assessment PROBLEM LIST (Impairments causing functional limitations): 
Decreased Transfer Abilities Decreased Ambulation Ability/Technique Decreased Balance Increased Pain Decreased Activity Tolerance INTERVENTIONS PLANNED: (Benefits and precautions of physical therapy have been discussed with the patient.) Balance Exercise Bed Mobility Gait Training Therapeutic Activites Therapeutic Exercise/Strengthening Transfer Training 
education TREATMENT PLAN: Frequency/Duration: 3 times a week for duration of hospital stay Rehabilitation Potential For Stated Goals: Good REHAB RECOMMENDATIONS (at time of discharge pending progress):   
Placement: It is my opinion, based on this patient's performance to date, that Ms. Crystal Arvizu may benefit from participating in 1-2 additional therapy sessions in order to continue to assess for rehab potential and then make recommendation for disposition at discharge. HHPT?? Equipment:  
None at this time HISTORY:  
History of Present Injury/Illness (Reason for Referral): 
Per MD note, \"Patient is a 68 y.o.  female with chronic respiratory failure on 3lpm & nocturnal Trilogy who presents with respiratory distress. Was found by family members this morning with her mask off, \"not looking good\", swollen and EMS was called. Family states she called about 5-6PM last night and reported Trilogy was alarming and was placing herself on O2. Unknown how long mask was off. Daughter said this morning she was conversant but then \"went out\".  From last admission she has a DNR in place but family felt she was \"suffering\" today, O2 sat per family was 35% and they allowed nasal intubation. Per EMS O2 sat was 50%--was not responsive. She received Versed in route and in the ER then placed on Versed drip as she began to move some. Was just discharged home Friday after hospital stay for syncopal type episode. Had been diagnosed with small bowel mass, Heparin bridge for AVR and endoscopy was planned but cancelled per patient desires. She is being admitted to the ICU on vent support for acute hypoxic respiratory failure. Was doing fairly well over the weekend. She is debilitated, slightly mobile with walker and overall debilitated. She fell Monday and EMS was called to the home twice but did not feel she needed to be brought to hospital.  Family reports her meds have been provided but not sure she is taking them. Was to see Dr. Go Leslie Monday but she called and cancelled appointment. Chronic Medical:  Systolic/diastolic heart failure, CAD with previous stent, cardiomyopathy with EF 20-25%, AVR with mechanical valve in 2005 and on Coumadin, ICD, HTN, HLD, COPD, pulmonary hypertension, OA, DM, MDS, former tobacco abuse-quit 2014. \" 
Past Medical History/Comorbidities: Ms. Danika Rollins  has a past medical history of Anticoagulated on Coumadin (7/9/2013), CAD (coronary artery disease) (1/20/2013), Cardiomyopathy ([facility] [address] CHF (congestive heart failure) ([facility] [address] (2/17/2017), CKD (chronic kidney disease) stage 3, GFR 30-59 ml/min ([facility]) (7/10/2013), COPD (chronic obstructive pulmonary disease) ([address] (4/2/2014), Diabetes ([address] Essential hypertension, benign (1/20/2013), HLD (hyperlipidemia), ICD (implantable cardioverter-defibrillator) in place (10/2/2014), Iron deficiency anemia due to chronic blood loss (7/29/2009), MDS (myelodysplastic syndrome) ([address] (12/17/2011), REJI (obstructive sleep apnea)-cpap (4/2/2014), Osteoarthritis, Osteopenia, Pulmonary hypertension ([address] (6/15/2016), Quadrantanopia, Skin defect (11/1/2018), and Visual complaint (2015). She also has no past medical history of Difficult intubation, Malignant hyperthermia due to anesthesia, Nausea & vomiting, Pseudocholinesterase deficiency, or Unspecified adverse effect of anesthesia. Ms. Samira Kennedy  has a past surgical history that includes cardiac catheterization (); ir bx bone marrow diagnostic (2011); hx aortic valve replacement (, ); hx  section; hx tubal ligation; hx heart catheterization (); hx coronary stent placement (May, 2014); hx pacemaker (10/2/2014); hx endoscopy (2009); and colonoscopy (N/A, 2019). Social History/Living Environment:  
Home Environment: Apartment # Steps to Enter: 4 Rails to Enter: Yes One/Two Story Residence: One story Living Alone: Yes Support Systems: Child(alysha), Home care staff [facility] Current DME Used/Available at Home: Adaptive bathing aides, Grab bars, Nebulizer, Oxygen, portable, Walker, Wheelchair, Tub transfer bench, Shower chair, Safety frame toliet, Raised toilet seat, Glucometer, Other (comment), Commode, bedside, Blood pressure cuff(trilogy) Prior Level of Function/Work/Activity: 
Lives alone, ambulates short distances with rollator independently. CLTC assists with ADL's. Number of Personal Factors/Comorbidities that affect the Plan of Care: 3+: HIGH COMPLEXITY EXAMINATION:  
Most Recent Physical Functioning:  
Gross Assessment: 
AROM: Generally decreased, functional 
Strength: Generally decreased, functional 
Sensation: Intact Posture: 
Posture (WDL): Exceptions to [facility] Posture Assessment: Forward head, Rounded shoulders Balance: 
Sitting: Intact Standing: Impaired Standing - Dynamic : Fair Bed Mobility: 
Sit to Supine: Minimum assistance Scooting: Moderate assistance Wheelchair Mobility: 
  
Transfers: 
Sit to Stand: Contact guard assistance Stand to Sit: Contact guard assistance Gait: 
  
Base of Support: Widened Speed/Winifred: Slow Step Length: Right shortened;Left shortened Distance (ft): 3 Feet (ft)(sidestepping at EOB) Assistive Device: Walker, rolling Ambulation - Level of Assistance: Minimal assistance Body Structures Involved: 
Lungs Body Functions Affected: 
Sensory/Pain Respiratory Movement Related Activities and Participation Affected: Mobility Self Care Domestic Life Number of elements that affect the Plan of Care: 3: MODERATE COMPLEXITY CLINICAL PRESENTATION:  
Presentation: Stable and uncomplicated: LOW COMPLEXITY CLINICAL DECISION [address] [place] [address] AM-PAC 6 Clicks Basic Mobility Inpatient Short Form How much difficulty does the patient currently have. .. Unable A Lot A Little None 1. Turning over in bed (including adjusting bedclothes, sheets and blankets)? ? 1   ? 2   ? 3   ? 4  
2. Sitting down on and standing up from a chair with arms ( e.g., wheelchair, bedside commode, etc.)   ? 1   ? 2   ? 3   ? 4  
3. Moving from lying on back to sitting on the side of the bed?   ? 1   ? 2   ? 3   ? 4 How much help from another person does the patient currently need. .. Total A Lot A Little None 4. Moving to and from a bed to a chair (including a wheelchair)? ? 1   ? 2   ? 3   ? 4  
5. Need to walk in hospital room? ? 1   ? 2   ? 3   ? 4  
6. Climbing 3-5 steps with a railing? ? 1   ? 2   ? 3   ? 4  
© , Trustees of [address] [place] [address], under license to [employer]. All rights reserved Score:  Initial: 18 Most Recent: X (Date: -- ) Interpretation of Tool:  Represents activities that are increasingly more difficult (i.e. Bed mobility, Transfers, Gait). Medical Necessity:    
Patient is expected to demonstrate progress in functional technique and activity tolerance  
 to increase independence with   and improve safety during all functional mobility.   
. 
Reason for Services/Other Comments: 
Patient continues to require skilled intervention due to medical complications and decline in functional mobility. .  
Use of outcome tool(s) and clinical judgement create a POC that gives a: Clear prediction of patient's progress: LOW COMPLEXITY  
  
 
 
 
TREATMENT:  
(In addition to Assessment/Re-Assessment sessions the following treatments were rendered) Pre-treatment Symptoms/Complaints:  none. Pain: Initial:  
Pain Intensity 1: 9 Pain Location 1: Shoulder Pain Orientation 1: Right  Post Session:  9 Assessment/Reassessment only, no treatment provided today Braces/Orthotics/Lines/Etc:  
Purewick O2 Device: Nasal cannula Treatment/Session Assessment:   
Response to Treatment:  pleasant and cooperative. Interdisciplinary Collaboration:  
Physical Therapist 
Occupational Therapist 
Registered Nurse After treatment position/precautions:  
Supine in bed Bed/Chair-wheels locked Bed in low position Call light within reach Compliance with Program/Exercises: Compliant all of the time, Will assess as treatment progresses Recommendations/Intent for next treatment session: \"Next visit will focus on advancements to more challenging activities and reduction in assistance provided\". Total Treatment Duration: PT Patient Time In/Time Out Time In: 1846 Time Out: 6762 A Janine Rosas, PT, DPT

## 2019-08-10 NOTE — PROGRESS NOTES
Problem: Self Care Deficits Care Plan (Adult) Goal: *Acute Goals and Plan of Care (Insert Text) Description 1. Patient will complete upper body bathing and dressing with minimal assistance and adaptive equipment as needed. 2. Patient will complete toileting with minimal assistance. 3. Patient will tolerate 25 minutes of OT treatment with 2-3 rest breaks to increase activity tolerance for ADLs. 4. Patient will complete functional transfers with supervision and adaptive equipment as needed. 5. Patient will complete functional mobility for household distances with supervision and appropriate safety awareness. Timeframe: 7 visits Outcome: Progressing Towards Goal 
 
 
OCCUPATIONAL THERAPY: Initial Assessment and AM 8/10/2019 INPATIENT:   
[place] / [place] / Product Type: Medicare /  
  
NAME/AGE/GENDER: Miles Christianson is a 68 y.o. female PRIMARY DIAGNOSIS:  Acute on chronic respiratory failure with hypoxia (HCC) [J96.21] Acute on chronic respiratory failure with hypoxia (HCC) Acute on chronic respiratory failure with hypoxia (HCC) ICD-10: Treatment Diagnosis:  
 Pain in Right Shoulder (M25.511) Stiffness of Right Shoulder, Not elsewhere classified (M25.611) Generalized Muscle Weakness (M62.81) Other lack of cordination (R27.8) History of falling (Z91.81) Precautions/Allergies: 
   Ferrlecit [sodium ferric gluconat-sucrose]; Sulfa (sulfonamide antibiotics); and Aspirin ASSESSMENT:  
 
Ms. Evy Trujillo was admitted with acute on chronic respiratory disorder. Pt lives alone in an apartment with a tub/shower and uses a rollator and has assistance with ADL from an aide 28 hrs/week at baseline. Pt states that typically she can complete her own toileting needs. Pt reports she wears 3 L of O2 during the day and Trilogy at night. Pt has hx of a few falls injuring her R shoulder.  Per imaging reports it states probable calcific tendinitis of the rotator cuff. This session, pt presented supine in the bed. Pt is alert and extremely pleasant. Pt appears appropriate for her age and states she is feeling much better. Pt demonstrated deficits in stiffness/pain/weakness in the R UE(shoulder), decreased activity tolerance, and impaired balance impacting ADLs. Pt transferred to the edge of the bed with minimal assistance and some additional time. Pt demonstrated good sitting balance edge of the bed. Pt reports that she is \"legally blind\". Pt is normally R handed but has been feeding herself with her L hand. Pt not able to raise her R shoulder forward at all and holds it in protective stance against her body. Pt has limited elbow flexion/extension and functional but decreased . Pt is limited with functional transfers due to only being able to use the L arm to assist. Pt stood to the walker with CGA and was able to hold to the walker with B UEs. Pt is on 4 L of O2 and sats stable throughout assessment. At the end of the session, pt was left up working with PT with needs in reach. Pt presented below functional baseline and would benefit from skilled OT services to address deficits. This section established at most recent assessment PROBLEM LIST (Impairments causing functional limitations): 
Decreased Strength Decreased ADL/Functional Activities Decreased Transfer Abilities Decreased Ambulation Ability/Technique Decreased Balance Increased Pain Decreased Activity Tolerance Decreased Work Simplification/Energy Conservation Techniques Increased Fatigue Increased Shortness of Breath Decreased Flexibility/Joint Mobility Decreased [place] with Home Exercise Program 
 INTERVENTIONS PLANNED: (Benefits and precautions of occupational therapy have been discussed with the patient.) Activities of daily living training Adaptive equipment training Balance training Clothing management Donning&doffing training Neuromuscular re-eduation Therapeutic activity Therapeutic exercise TREATMENT PLAN: Frequency/Duration: Follow patient 3 times per week to address above goals. Rehabilitation Potential For Stated Goals: Good REHAB RECOMMENDATIONS (at time of discharge pending progress):   
Placement: It is my opinion, based on this patient's performance to date, that Ms. Diaz may benefit from [address] after discharge due to the functional deficits listed above that are likely to improve with skilled rehabilitation because he/she has multiple medical issues that affect his/her functional mobility in the community vs STR- TBD based on patient's progress during next few visits. Equipment: TBD OCCUPATIONAL PROFILE AND HISTORY:  
History of Present Injury/Illness (Reason for Referral): 
See H&P Past Medical History/Comorbidities: Ms. Diaz  has a past medical history of Anticoagulated on Coumadin (7/9/2013), CAD (coronary artery disease) (1/20/2013), Cardiomyopathy ([address] CHF (congestive heart failure) ([facility] (2/17/2017), CKD (chronic kidney disease) stage 3, GFR 30-59 ml/min ([facility]) (7/10/2013), COPD (chronic obstructive pulmonary disease) ([address] (4/2/2014), Diabetes ([address] Essential hypertension, benign (1/20/2013), HLD (hyperlipidemia), ICD (implantable cardioverter-defibrillator) in place (10/2/2014), Iron deficiency anemia due to chronic blood loss (7/29/2009), MDS (myelodysplastic syndrome) ([address] (12/17/2011), REJI (obstructive sleep apnea)-cpap (4/2/2014), Osteoarthritis, Osteopenia, Pulmonary hypertension ([address] (6/15/2016), Quadrantanopia, Skin defect (11/1/2018), and Visual complaint (12/14/2015). She also has no past medical history of Difficult intubation, Malignant hyperthermia due to anesthesia, Nausea & vomiting, Pseudocholinesterase deficiency, or Unspecified adverse effect of anesthesia.   Ms. Diaz  has a past surgical history that includes cardiac catheterization (); ir bx bone marrow diagnostic (2011); hx aortic valve replacement (, ); hx  section; hx tubal ligation; hx heart catheterization (); hx coronary stent placement (May, 2014); hx pacemaker (10/2/2014); hx endoscopy (2009); and colonoscopy (N/A, 2019). Social History/Living Environment:  
Home Environment: Apartment # Steps to Enter: 4 Rails to Enter: Yes One/Two Story Residence: One story Living Alone: Yes Support Systems: Child(alysha) Patient Expects to be Discharged to[de-identified] DRESWAIAL Current DME Used/Available at Home: Shower chair, Walker, rollator Tub or Shower Type: Tub/Shower combination Prior Level of Function/Work/Activity: 
Pt lives alone in an apartment with a tub/shower and uses a rollator and has assistance with ADL from an aide 28 hrs/week at baseline. Pt states that typically she can complete her own toileting needs. Pt reports she wears 3 L of O2 during the day and Trilogy at night. Pt has hx of a few falls injuring her R shoulder. Personal Factors:   
      Social Background:  lives alone Past/Current Experience:  multiple hospital admissions Other factors that influence how disability is experienced by the patient:  multiple co-morbidities Number of Personal Factors/Comorbidities that affect the Plan of Care: Extensive review of physical, cognitive, and psychosocial performance (3+):  HIGH COMPLEXITY ASSESSMENT OF OCCUPATIONAL PERFORMANCE[de-identified]  
Activities of Daily Living:  
Basic ADLs (From Assessment) Complex ADLs (From Assessment) Feeding: Minimum assistance Oral Facial Hygiene/Grooming: Minimum assistance Bathing: Moderate assistance Upper Body Dressing: Moderate assistance Lower Body Dressing: Moderate assistance Toileting: Moderate assistance Instrumental ADL Meal Preparation: Total assistance Homemaking: Total assistance Grooming/Bathing/Dressing Activities of Daily Living Cognitive Retraining Safety/Judgement: Awareness of environment Bed/Mat Mobility Supine to Sit: Minimum assistance; Additional time Sit to Supine: Minimum assistance Sit to Stand: Contact guard assistance Stand to Sit: Contact guard assistance Scooting: Moderate assistance Most Recent Physical Functioning:  
Gross Assessment: 
AROM: Generally decreased, functional(non-functional in the R UE; significant shoulder limitation) Strength: Generally decreased, functional(non-functional strength in R shoulder) Posture: 
Posture (WDL): Exceptions to [facility] Posture Assessment: Forward head, Rounded shoulders Balance: 
Sitting: Intact Standing: Impaired Standing - Static: Fair Standing - Dynamic : Fair Bed Mobility: 
Supine to Sit: Minimum assistance; Additional time Sit to Supine: Minimum assistance Scooting: Moderate assistance Wheelchair Mobility: 
  
Transfers: 
Sit to Stand: Contact guard assistance Stand to Sit: Contact guard assistance Patient Vitals for the past 6 hrs: 
 BP SpO2 O2 Flow Rate (L/min) Pulse 08/10/19 0430 112/56 96 %  63  
08/10/19 0501 96/50 94 %  69  
08/10/19 0529 151/67 94 %  70  
08/10/19 0600 127/62 97 %  (!) 57  
08/10/19 0629 128/63 98 %  75  
08/10/19 0700 125/60 97 % 4 l/min 69  
08/10/19 0714  98 % 3 l/min   
08/10/19 0729 127/60 100 %  60  
08/10/19 0800 131/68 92 %  69  
08/10/19 0809 131/68   68  
08/10/19 0810    69  
08/10/19 0829 117/55 96 %  67  
08/10/19 0914 127/62 97 %  69  
08/10/19 0929 127/65 100 %  66 Mental Status Neurologic State: Alert Orientation Level: Oriented X4 Cognition: Appropriate for age attention/concentration, Follows commands Perception: Appears intact Perseveration: No perseveration noted Safety/Judgement: Awareness of environment Physical Skills Involved: 
Range of Motion Balance Strength Activity Tolerance Pain (Chronic) Cognitive Skills Affected (resulting in the inability to perform in a timely and safe manner): 
None  Psychosocial Skills Affected: 
None Number of elements that affect the Plan of Care: 3-5:  MODERATE COMPLEXITY CLINICAL DECISION MAKIN[address] [place] [id number] AM-PAC 6 Clicks Daily Activity Inpatient Short Form How much help from another person does the patient currently need. .. Total A Lot A Little None 1. Putting on and taking off regular lower body clothing? ? 1   ? 2   ? 3   ? 4  
2. Bathing (including washing, rinsing, drying)? ? 1   ? 2   ? 3   ? 4  
3. Toileting, which includes using toilet, bedpan or urinal?   ? 1   ? 2   ? 3   ? 4  
4. Putting on and taking off regular upper body clothing? ? 1   ? 2   ? 3   ? 4  
5. Taking care of personal grooming such as brushing teeth? ? 1   ? 2   ? 3   ? 4  
6. Eating meals? ? 1   ? 2   ? 3   ? 4  
© , Trustees of [address] [place] [address], under license to [employer]. All rights reserved Score:  Initial: 14 Most Recent: X (Date: -- ) Interpretation of Tool:  Represents activities that are increasingly more difficult (i.e. Bed mobility, Transfers, Gait). Medical Necessity:    
Patient demonstrates good 
 rehab potential due to higher previous functional level. Reason for Services/Other Comments: 
Patient continues to require skilled intervention due to decreased independence with ADL/functional transfers Sybil Srinivasan Use of outcome tool(s) and clinical judgement create a POC that gives a: MODERATE COMPLEXITY  
 
 
 
TREATMENT:  
(In addition to Assessment/Re-Assessment sessions the following treatments were rendered) Pre-treatment Symptoms/Complaints:   
Pain: Initial:  
Pain Intensity 1: 9 Pain Location 1: Shoulder Pain Orientation 1: Right  Post Session:  same Assessment/Reassessment only, no treatment provided today Braces/Orthotics/Lines/Etc:  
ICU monitoring O2 Device: Nasal cannula Treatment/Session Assessment:   
Response to Treatment:  Eval only Interdisciplinary Collaboration: Occupational Therapist 
Registered Nurse After treatment position/precautions: Working with PT Compliance with Program/Exercises: Will assess as treatment progresses. Recommendations/Intent for next treatment session: \"Next visit will focus on advancements to more challenging activities and reduction in assistance provided\". Total Treatment Duration: OT Patient Time In/Time Out Time In: 1767 Time Out: 1869 Tim Mask, OT

## 2019-08-10 NOTE — PROGRESS NOTES
8/10/2019 9:07 AM 
 
Admit Date: 8/7/2019 Admit Diagnosis: Acute on chronic respiratory failure with hypoxia ([facility] [J96.21] Subjective: No cp or sob Objective:  
  
Visit Vitals /68 Pulse 69 Temp 98.4 °F (36.9 °C) Resp 18 Ht 5' 2\" (1.575 m) Wt 93.6 kg (206 lb 5.6 oz) SpO2 98% BMI 37.74 kg/m² Physical Exam: 
Stephanie Grandchild, Well Nourished, No Acute Distress, Alert & Oriented x 3, appropriate mood. Neck- supple, no JVD 
CV- regular rate and rhythm no MRG Lung- clear bilaterally Abd- soft, nontender, nondistended Ext- no edema bilaterally. Skin- warm and dry Data Review:  
Recent Labs 08/10/19 
2808 08/09/19 
1350 08/09/19 
4850 NA  --  142 143 K  --  3.9 4.6 BUN  --  69* 74* CREA  --  2.71* 2.87* WBC  --   --  7.1 HGB  --   --  8.1* HCT  --   --  26.6*  
PLT  --   --  169 INR 2.4  --  2.9 Assessment/Plan:  
 
Principal Problem: 
  Acute on chronic respiratory failure with hypoxia ([facility] (8/7/2019)Improved with current therapy. Will continue medications Active Problems: 
  CKD (chronic kidney disease) stage 4, GFR 15-29 ml/min ([facility]) (7/10/2013) COPD (chronic obstructive pulmonary disease) ([facility] (4/2/2014) Overview: HOME OXYGEN 3 LITERS 
 
  REJI (obstructive sleep apnea)-cpap (4/2/2014) Overview: Home Trilogy ICD (implantable cardioverter-defibrillator) in place (10/2/2014)- no more vt on higher dose coreg Overview: [employer] single-chamber ICD implantation 10/20/14 S/P AVR (aortic valve replacement) () Overview: Mechanical/Artific Cardiomyopathy ([facility] () Type 2 diabetes mellitus with nephropathy ([facility] (12/18/2017) Chronic respiratory failure with hypoxia ([facility] (9/7/2018) Debility (9/8/2018) Acute exacerbation of systolic CHF (congestive heart failure) ([facility] (11/14/2018)Improved with current therapy. Will continue medications Change to po lasix-no ace/arb due to ARF 
 Severe obesity ([facility] (1/15/2019) Acute renal failure superimposed on chronic kidney disease ([facility] (8/8/2019)

## 2019-08-10 NOTE — PROGRESS NOTES
[facility] OUTREACH NURSE PROGRESS REPORT SUBJECTIVE: Called to assess patient secondary to transfer from ICU. MEWS Score: 2 (08/10/19 1123) Vitals:  
 08/10/19 1030 08/10/19 1037 08/10/19 1123 08/10/19 1155 BP: 118/66  102/59 Pulse: 64 65 62 Resp: 16 22 21 Temp:   98.3 °F (36.8 °C) SpO2: 100% 100% 97% 96% Weight:      
Height:      
  
 
LAB DATA: 
 
Recent Labs 08/09/19 
1350 08/09/19 
0339 08/08/19 
1441  143 141  
K 3.9 4.6 4.3 CL 97* 96* 95* CO2 40* 39* 38* AGAP 5* 8 8 * 90 131* BUN 69* 74* 77* CREA 2.71* 2.87* 3.14* GFRAA 22* 21* 19* GFRNA 18* 17* 16*  
CA 9.0 9.6 9.9 MG 1.9  --   --   
ALB  --  2.8* 3.3 TP  --  6.2* 6.9 GLOB  --  3.4 3.6* AGRAT  --  0.8* 0.9* ALT  --  1,007* 1,153* Recent Labs 08/09/19 
9888 08/08/19 
8406 WBC 7.1 9.8 HGB 8.1* 8.9* HCT 26.6* 28.2*  
 179 OBJECTIVE: On arrival to room, I found patient to be resting in bed. Pain Assessment Pain Intensity 1: 0 (08/10/19 1123) Pain Location 1: Shoulder Pain Intervention(s) 1: Emotional support, Repositioned Patient Stated Pain Goal: 0 
 
 
ASSESSMENT:  Patient wakes to voice. Respirations even and unlabored. SaO2 97% on 3L NC. Denies complaints. VS, labs, and progress notes reviewed. PLAN:  Will continue to follow per outreach protocol.

## 2019-08-10 NOTE — PROGRESS NOTES
Critical Care Daily Progress Note: 8/10/2019 [address] Admission Date: 8/7/2019 The patient's chart is reviewed and the patient is discussed with the staff. 68 y.o. AAF with chronic respiratory failure on 3lpm & nocturnal Trilogy who presents with respiratory distress.  Was found by family members \"not looking good\", swollen and EMS was called. A DNR was  in place but family felt she was \"suffering\" , O2 sat 35% and they allowed nasal intubation.  Received Versed in route and placed on Versed drip.   Was discharged home Friday after syncopal type episode. Jennifer Arevalo been diagnosed with small bowel mass, Heparin bridge for AVR and endoscopy was planned but cancelled per patient desires. Tiffanie Rodriguez is being admitted to the ICU on vent support for acute hypoxic respiratory failure. 
 Mobile with walker and overall debilitated.  She fell Monday and EMS was called to the home twice but did not feel she needed to be brought to hospital.  
 Chronic Medical:  Systolic/diastolic heart failure, CAD with previous stent, cardiomyopathy with EF 20-25%, AVR with mechanical valve in 2005 and on Coumadin, ICD, HTN, HLD, COPD, pulmonary hypertension, OA, DM, MDS, former tobacco abuse-quit 2014. Was extubated 8/8 to home Trilogy (new machine obtained from [employer]) Subjective:  
 
Overnight there were brief desaturations to 86% last night and oxygen was adjusted. No further VT. Patient still having a wet cough. Current Facility-Administered Medications Medication Dose Route Frequency  lidocaine 4 % patch 1 Patch  1 Patch TransDERmal Q24H  
 furosemide (LASIX) injection 40 mg  40 mg IntraVENous BID  warfarin on hold per pharmacy  1 mg Oral See Admin Instructions  carvedilol (COREG) tablet 12.5 mg  12.5 mg Oral BID WITH MEALS  sodium chloride (NS) flush 5-40 mL  5-40 mL IntraVENous Q8H  
 sodium chloride (NS) flush 5-40 mL  5-40 mL IntraVENous PRN  
  LORazepam (ATIVAN) injection 2 mg  2 mg IntraVENous Q4H PRN  
 albuterol (PROVENTIL VENTOLIN) nebulizer solution 2.5 mg  2.5 mg Nebulization Q4H RT  
 aspirin delayed-release tablet 81 mg  81 mg Oral DAILY  docusate sodium (COLACE) capsule 100 mg  100 mg Oral DAILY  pantoprazole (PROTONIX) tablet 40 mg  40 mg Oral DAILY  sertraline (ZOLOFT) tablet 50 mg  50 mg Oral DAILY  traZODone (DESYREL) tablet 50 mg  50 mg Oral QHS PRN  
 ondansetron (ZOFRAN) injection 4 mg  4 mg IntraVENous Q4H PRN Review of Systems Constitutional:  negative for fever, chills, sweats Cardiovascular:  Trace LE edema, negative for chest pain, palpitations, syncope Gastrointestinal:  negative for dysphagia, reflux, vomiting, diarrhea, abdominal pain, or melena Neurologic:  negative for focal weakness, numbness, headache Objective:  
 
Vitals:  
 08/10/19 4218 08/10/19 2181 08/10/19 0809 08/10/19 3412 BP:   131/68 Pulse:   68 69 Resp:      
Temp:  98.4 °F (36.9 °C) SpO2: 98% Weight:      
Height:      
 
 
 
Intake/Output Summary (Last 24 hours) at 8/10/2019 2132 Last data filed at 8/10/2019 0913 Gross per 24 hour Intake 580 ml Output 2195 ml Net -1615 ml Physical Exam:         
Constitutional:  the patient is obese and in no acute distress, NC 4L sat 93% EENMT:  Sclera clear, pupils equal, oral mucosa moist 
Respiratory: wet cough, nonproductive, diminished in bases, rare wheeze Cardiovascular:  RRR without M,G,R 
Gastrointestinal: soft and non-tender; with positive bowel sounds. Musculoskeletal: warm without cyanosis. There is trace lower extremity edema. Skin:  no jaundice or rashes, no wounds Neurologic: no gross neuro deficits Psychiatric:  alert and oriented x 3 LINES:   
PIV sites Ramirez 8/7/19 DRIPS:   None CXR:  
8/9/19:  Left lower lobe lobar atelectasis unchanged. Cardiomegaly unchanged. LAB Recent Labs 08/07/19 
1000 GLUCPOC 117* Recent Labs 08/10/19 
0886 08/09/19 
3058 08/08/19 
8863 08/07/19 
1861 WBC  --  7.1 9.8 8.6 HGB  --  8.1* 8.9* 8.7* HCT  --  26.6* 28.2* 29.8* PLT  --  169 179 204 INR 2.4 2.9 2.7 2.5 Recent Labs 08/09/19 
1350 08/09/19 
0339 08/08/19 
1441  08/07/19 
1108  143 141   < > 140  
K 3.9 4.6 4.3   < > 4.8 CL 97* 96* 95*   < > 98  
CO2 40* 39* 38*   < > 34* * 90 131*   < > 92 BUN 69* 74* 77*   < > 65* CREA 2.71* 2.87* 3.14*   < > 2.87* MG 1.9  --   --   --   --   
CA 9.0 9.6 9.9   < > 8.7 ALB  --  2.8* 3.3  --  2.9* TBILI  --  1.1 1.3*  --  1.1 ALT  --  1,007* 1,153*  --  532* SGOT  --  1,075* 1,487*  --  835*  
 < > = values in this interval not displayed. No results for input(s): LCAD, LAC in the last 72 hours. Recent Labs 08/10/19 
0327 08/08/19 
0719 08/08/19 
9834 PHI 7.424 7.535* 7.582* PCO2I 68.2* 50.7* 41.0 PO2I 150* 114* 75 HCO3I 44.6* 42.8* 38.6* Assessment & Plan:  (Medical Decision Making) Vianca Waters is a 63yoF with cardiomyopathy, pulmonary hypertension, severe chronic respiratory failure who was admitted with volume overload, elevated LFTs, FELIZ, DM, MDS Hospital Problems  Date Reviewed: 8/9/2019 Codes Class Noted POA * (Principal) Acute on chronic respiratory failure with hypoxia (HCC) ICD-10-CM: J96.21 
ICD-9-CM: 518.84, 799.02  8/7/2019 Yes Used Trilogy last night but became hypoxic and changed to NC--DME to eval  
 Acute renal failure superimposed on chronic kidney disease ([facility] ICD-10-CM: N17.9, N18.9 ICD-9-CM: 584.9, 585.9  8/8/2019 No  
 creatinine Severe obesity ([facility] ICD-10-CM: E66.01 
ICD-9-CM: 278.01  1/15/2019 Yes  
 chronic Acute exacerbation of CHF (congestive heart failure) (HCC) ICD-10-CM: I50.9 ICD-9-CM: 428.0  11/14/2018 Yes Debility ICD-10-CM: R53.81 ICD-9-CM: 799.3  9/8/2018 Yes  
 chronic  Chronic respiratory failure with hypoxia (HCC) ICD-10-CM: J96.11 
 ICD-9-CM: 518.83, 799.02  9/7/2018 Yes  
 chronic Type 2 diabetes mellitus with nephropathy (HCC) (Chronic) ICD-10-CM: E11.21 
ICD-9-CM: 250.40, 583.81  12/18/2017 Yes Chronic--ranges  S/P AVR (aortic valve replacement) (Chronic) ICD-10-CM: Z95.2 ICD-9-CM: V43.3  Unknown Yes Overview Signed 2/23/2016 11:04 AM by Oscar Gonzalez Mechanical/Artific Chronic--on Coumadin  
 Cardiomyopathy (HCC) (Chronic) ICD-10-CM: I42.9 ICD-9-CM: 425.4  Unknown Yes  
 chronic  
 ICD (implantable cardioverter-defibrillator) in place ICD-10-CM: Z95.810 ICD-9-CM: V45.02  10/2/2014 Yes Overview Signed 10/2/2014  4:58 PM by Criss Ayala Bi[employer]ron[employer] single-chamber ICD implantation 10/20/14 
  
  
 chronic COPD (chronic obstructive pulmonary disease) (HCC) (Chronic) ICD-10-CM: J44.9 ICD-9-CM: 369  4/2/2014 Yes Overview Signed 10/6/2018  8:56 PM by Hernandez Saldivar NP  
  HOME OXYGEN 3 LITERS Chronic--rare wheeze REJI (obstructive sleep apnea)-cpap (Chronic) ICD-10-CM: G47.33 
ICD-9-CM: 327.23  4/2/2014 Yes Overview Signed 8/7/2019 12:50 PM by Umer Gilbert NP Home Trilogy DME made some changes to Trilogy yesterday CKD (chronic kidney disease) stage 4, GFR 15-29 ml/min ([facility]) ICD-10-CM: N18.4 ICD-9-CM: 585.4  7/10/2013 Yes Creatinine down 2.71  
  
 
--continue bid IV lasix 
--PT/OT 
--Nocturnal trilogy 
--add mucinex, vibralung for airway clearance 
--anticipate discharge with [facility] when BNP down, creatinine closer to baseline of 1.5 and liver function stabilized. --stable to transfer to floor today.  
 
Yaz Gomez MD

## 2019-08-10 NOTE — PROGRESS NOTES
Warfarin dosing per pharmacist 
 
Angie Pandya Nani Alas is a 68 y.o. female. Height: 5' 2\" (157.5 cm)    Weight: 93.6 kg (206 lb 5.6 oz) Indication:  Mechanical AVR Goal INR:  2-3 Home dose:  4 mg hs Risk factors or significant drug interactions:  -- Other anticoagulants:  --- Daily Monitoring Date  INR     Warfarin dose HGB              Notes 8/7  2.5  ---  8.7 
8/8  2.7  1 mg  8.9 
8/9  2.9  Hold  8.1 8/10  2.4  1 mg  --- Pharmacy consulted to assist in patient's warfarin dosing. Pt was just recently admitted and discharged on 4 mg hs. INR 2.4 after being held last night. Will restart with 1 mg again this evening. Following daily INR. Thank you, Barber Laurent, Pharm. D. Clinical Pharmacist 
[phone]

## 2019-08-10 NOTE — PROGRESS NOTES
TRANSFER - IN REPORT: 
 
Verbal report received from [facility] RN(name) on [facility]  being received from ICU (unit) for routine progression of care Report consisted of patients Situation, Background, Assessment and  
Recommendations(SBAR). Information from the following report(s) SBAR was reviewed with the receiving nurse. Opportunity for questions and clarification was provided. Assessment completed upon patients arrival to unit and care assumed.

## 2019-08-11 NOTE — PROGRESS NOTES
Date of Outreach Update: 
Vianca Chou was seen and assessed. Previous Outreach assessment has been reviewed. There have been no significant clinical changes since the completion of the last dated Outreach assessment. Patient resting comfortably, no visible signs of distress. Breathing even and unlabored, on NC. Spoke with primary RN who has no concerns. Will continue to follow up per outreach protocol. Signed By:   Jr Nation   August 11, 2019 2:48 AM

## 2019-08-11 NOTE — PROGRESS NOTES
Critical Care Daily Progress Note: 8/11/2019 [address] Admission Date: 8/7/2019 The patient's chart is reviewed and the patient is discussed with the staff. 68 y.o. AAF with chronic respiratory failure on 3lpm & nocturnal Trilogy who presents with respiratory distress.  Was found by family members \"not looking good\", swollen and EMS was called. A DNR was  in place but family felt she was \"suffering\" , O2 sat 35% and they allowed nasal intubation.  Received Versed in route and placed on Versed drip.   Was discharged home Friday after syncopal type episode. Leonid Faustin been diagnosed with small bowel mass, Heparin bridge for AVR and endoscopy was planned but cancelled per patient desires. Eliecer Galvez is being admitted to the ICU on vent support for acute hypoxic respiratory failure. 
 Mobile with walker and overall debilitated.  She fell Monday and EMS was called to the home twice but did not feel she needed to be brought to hospital.  
 Chronic Medical:  Systolic/diastolic heart failure, CAD with previous stent, cardiomyopathy with EF 20-25%, AVR with mechanical valve in 2005 and on Coumadin, ICD, HTN, HLD, COPD, pulmonary hypertension, OA, DM, MDS, former tobacco abuse-quit 2014. Was extubated 8/8 to home Trilogy (new machine obtained from [employer]) Subjective:  
 
Feels better. Is back on 3L NC and using Trilogy at night (home regimen). Feels overall much better and edema is much improved. Current Facility-Administered Medications Medication Dose Route Frequency  lidocaine 4 % patch 1 Patch  1 Patch TransDERmal Q24H  
 guaiFENesin ER (MUCINEX) tablet 1,200 mg  1,200 mg Oral Q12H  
 traMADol (ULTRAM) tablet 50 mg  50 mg Oral Q6H PRN  
 furosemide (LASIX) injection 40 mg  40 mg IntraVENous BID  warfarin on hold per pharmacy  1 mg Oral See Admin Instructions  carvedilol (COREG) tablet 12.5 mg  12.5 mg Oral BID WITH MEALS  
  sodium chloride (NS) flush 5-40 mL  5-40 mL IntraVENous Q8H  
 sodium chloride (NS) flush 5-40 mL  5-40 mL IntraVENous PRN  
 LORazepam (ATIVAN) injection 2 mg  2 mg IntraVENous Q4H PRN  
 albuterol (PROVENTIL VENTOLIN) nebulizer solution 2.5 mg  2.5 mg Nebulization Q4H RT  
 aspirin delayed-release tablet 81 mg  81 mg Oral DAILY  docusate sodium (COLACE) capsule 100 mg  100 mg Oral DAILY  pantoprazole (PROTONIX) tablet 40 mg  40 mg Oral DAILY  sertraline (ZOLOFT) tablet 50 mg  50 mg Oral DAILY  traZODone (DESYREL) tablet 50 mg  50 mg Oral QHS PRN  
 ondansetron (ZOFRAN) injection 4 mg  4 mg IntraVENous Q4H PRN Review of Systems Constitutional:  negative for fever, chills, sweats Cardiovascular:  Trace LE edema, negative for chest pain, palpitations, syncope Gastrointestinal:  negative for dysphagia, reflux, vomiting, diarrhea, abdominal pain, or melena Neurologic:  negative for focal weakness, numbness, headache Objective:  
 
Vitals:  
 08/11/19 0320 08/11/19 0403 08/11/19 0630 08/11/19 9403 BP: 117/71 Pulse: 68 Resp: 17 Temp: 98.7 °F (37.1 °C) SpO2: 97% 97%  96% Weight:   206 lb 9.1 oz (93.7 kg) Height:      
 
 
Intake/Output Summary (Last 24 hours) at 8/11/2019 0805 Last data filed at 8/11/2019 0630 Gross per 24 hour Intake 720 ml Output 1600 ml Net -880 ml Physical Exam:         
Constitutional:  the patient is obese and in no acute distress, NC 4L sat 93% EENMT:  Sclera clear, pupils equal, oral mucosa moist 
Respiratory: wet cough, nonproductive, diminished in bases, rare wheeze Cardiovascular:  RRR without M,G,R 
Gastrointestinal: soft and non-tender; with positive bowel sounds. Musculoskeletal: warm without cyanosis. There is trace lower extremity edema. Skin:  no jaundice or rashes, no wounds Neurologic: no gross neuro deficits Psychiatric:  alert and oriented x 3 CXR: None new 8/9/19:  Left lower lobe lobar atelectasis unchanged. Cardiomegaly unchanged. LAB No results for input(s): GLUCPOC in the last 72 hours. No lab exists for component: [place] Recent Labs 08/11/19 
9111 08/10/19 
3889 08/09/19 
6973 WBC  --   --  7.1 HGB  --   --  8.1* HCT  --   --  26.6*  
PLT  --   --  169 INR 1.7 2.4 2.9 Recent Labs 08/09/19 
1350 08/09/19 
0339 08/08/19 
1441  143 141  
K 3.9 4.6 4.3 CL 97* 96* 95* CO2 40* 39* 38* * 90 131* BUN 69* 74* 77* CREA 2.71* 2.87* 3.14* MG 1.9  --   --   
CA 9.0 9.6 9.9 ALB  --  2.8* 3.3 TBILI  --  1.1 1.3* ALT  --  1,007* 1,153* SGOT  --  1,075* 1,487* No results for input(s): LCAD, LAC in the last 72 hours. Recent Labs 08/10/19 
0327 PHI 7.424 PCO2I 68.2*  
PO2I 150* HCO3I 44.6* Assessment & Plan:  (Medical Decision Making) Vianca Bloom is a 63yoF with cardiomyopathy, pulmonary hypertension, severe chronic respiratory failure who was admitted with volume overload, elevated LFTs, FELIZ, DM, MDS Hospital Problems  Date Reviewed: 8/9/2019 Codes Class Noted POA * (Principal) Acute on chronic respiratory failure with hypoxia (HCC) ICD-10-CM: J96.21 
ICD-9-CM: 518.84, 799.02  8/7/2019 Yes Used Trilogy last night but became hypoxic and changed to NC--DME to eval  
 Acute renal failure superimposed on chronic kidney disease ([facility] [address] ICD-10-CM: N17.9, N18.9 ICD-9-CM: 584.9, 585.9  8/8/2019 No  
 creatinine Severe obesity ([facility] [address] ICD-10-CM: E66.01 
ICD-9-CM: 278.01  1/15/2019 Yes  
 chronic Acute exacerbation of CHF (congestive heart failure) (HCC) ICD-10-CM: I50.9 ICD-9-CM: 428.0  11/14/2018 Yes Debility ICD-10-CM: R53.81 ICD-9-CM: 799.3  9/8/2018 Yes  
 chronic Chronic respiratory failure with hypoxia [facility]) ICD-10-CM: J96.11 
ICD-9-CM: 518.83, 799.02  9/7/2018 Yes  
 chronic  Type 2 diabetes mellitus with nephropathy (HCC) (Chronic) ICD-10-CM: E11.21 
 ICD-9-CM: 250.40, 583.81  12/18/2017 Yes Chronic--ranges  S/P AVR (aortic valve replacement) (Chronic) ICD-10-CM: Z95.2 ICD-9-CM: V43.3  Unknown Yes Overview Signed 2/23/2016 11:04 AM by Maci Vee [employer] Chronic--on Coumadin  
 Cardiomyopathy (HCC) (Chronic) ICD-10-CM: I42.9 ICD-9-CM: 425.4  Unknown Yes  
 chronic  
 ICD (implantable cardioverter-defibrillator) in place ICD-10-CM: Z95.810 ICD-9-CM: V45.02  10/2/2014 Yes Overview Signed 10/2/2014  4:58 PM by Savage Ritchieroniblaise single-chamber ICD implantation 10/20/14 
  
  
 chronic COPD (chronic obstructive pulmonary disease) (HCC) (Chronic) ICD-10-CM: J44.9 ICD-9-CM: 042  4/2/2014 Yes Overview Signed 10/6/2018  8:56 PM by Vicente Mckeon NP  
  HOME OXYGEN 3 LITERS Chronic--rare wheeze REJI (obstructive sleep apnea)-cpap (Chronic) ICD-10-CM: G47.33 
ICD-9-CM: 327.23  4/2/2014 Yes Overview Signed 8/7/2019 12:50 PM by Herschel Collet, NP Home Trilogy DME made some changes to Trilogy yesterday CKD (chronic kidney disease) stage 4, GFR 15-29 ml/min ([facility]) ICD-10-CM: N18.4 ICD-9-CM: 585.4  7/10/2013 Yes Creatinine down 2.71  
  
 
--continue bid IV lasix, edema and renal function improving 
--PT/OT 
--Nocturnal trilogy --continue mucinex, vibralung for airway clearance 
--anticipate discharge with [facility] when BNP down, creatinine closer to baseline of 1.5 and liver function stabilized. (check CBC, CMP tomorrow) --consult hospitalist to assume care tomorrow.  
 
Sheldon Smalls MD

## 2019-08-11 NOTE — PROGRESS NOTES
Pt in bed sleeping. Respirations present. No s/sx of distress. Call light within reach. Bed low and locked.

## 2019-08-11 NOTE — PROGRESS NOTES
END OF SHIFT NOTE: 
 
No c/o pain, N/V/D. Lidocaine patch seemed to help with shoulder pain. VSS. No needs voiced at this time. Intake/Output 08/11 0701 - 08/11 1900 In: 240 [P.O.:240] Out: 250 [Urine:250] Voiding: YES- purewick Catheter: NO 
Drain:  
External Female Catheter 08/09/19 (Active) Site Assessment Clean, dry, & intact 8/11/2019  2:25 PM  
Repositioned Yes 8/11/2019  2:25 PM  
Perineal Care Yes 8/11/2019  2:25 PM  
Wick Changed Yes 8/11/2019  2:25 PM  
Suction Canister/Tubing Changed No 8/11/2019  2:25 PM  
Urine Output (mL) 650 ml 8/10/2019  6:26 PM  
 
 
 
 
 
 
Stool:  1 occurrences. Stool Assessment Stool Color: Matt Munoz (08/11/19 1018) Stool Appearance: Formed (08/11/19 1018) Stool Amount: Medium (08/11/19 1018) Stool Source/Status: Rectum (08/11/19 1018) Emesis:  0 occurrences. VITAL SIGNS Patient Vitals for the past 12 hrs: 
 Temp Pulse Resp BP SpO2  
08/11/19 1648 98.1 °F (36.7 °C) 68 18 109/54 98 % 08/11/19 1548     95 % 08/11/19 1204 99 °F (37.2 °C) 63 17 109/50   
08/11/19 1149     96 % 08/11/19 0800 98.6 °F (37 °C) 71 17 123/54 98 % 08/11/19 0738     96 % Pain Assessment Pain 1 Pain Scale 1: Numeric (0 - 10) (08/11/19 1425) Pain Intensity 1: 0 (08/11/19 1425) Patient Stated Pain Goal: 0 (08/11/19 0310) Pain Reassessment 1: Patient resting w/respiratory rate greater than 10 (08/10/19 2330) Faces ([place]-Baker) Scale 1: No hurt (08/07/19 0957) Pain Onset 1: chronic (08/10/19 0854) Pain Location 1: Shoulder (08/10/19 2215) Pain Orientation 1: Right;Upper (08/10/19 2215) Pain Description 1: Shooting;Aching (08/10/19 2215) Pain Intervention(s) 1: Medication (see MAR) (08/10/19 2215) Ambulating No 
 
Additional Information:  
 
Shift report will be given to oncoming nurse at the bedside. Maday Fletcher

## 2019-08-11 NOTE — CONSULTS
Hospitalist Note Admit Date:  2019  9:57 AM  
Name:  Edin Wheeler Age:  68 y.o. 
:  1950 MRN:  [id number] PCP:  Christal Matute MD 
Treatment Team: Attending Provider: Letty Barrios MD; Care Manager: Whit Nixon, RN; Utilization Review: Mary Ray; Consulting Provider: Domitila Chilel MD 
 
HPI/Subjective:  
Patient is a 68 y.o.  female with chronic respiratory failure on 3lpm & nocturnal Trilogy who presents with respiratory distress. Was found by family members this morning with her mask off, \"not looking good\", swollen and EMS was called. Family states she called about 5-6PM last night and reported Trilogy was alarming and was placing herself on O2. Unknown how long mask was off. Daughter said this morning she was conversant but then \"went out\". From last admission she has a DNR in place but family felt she was \"suffering\" today, O2 sat per family was 35% and they allowed nasal intubation. Per EMS O2 sat was 50%--was not responsive. She received Versed in route and in the ER then placed on Versed drip as she began to move some. Was just discharged home Friday after hospital stay for syncopal type episode. Had been diagnosed with small bowel mass, Heparin bridge for AVR and endoscopy was planned but cancelled per patient desires. She is being admitted to the ICU on vent support for acute hypoxic respiratory failure. 
  
Was doing fairly well over the weekend. She is debilitated, slightly mobile with walker and overall debilitated. She fell Monday and EMS was called to the home twice but did not feel she needed to be brought to hospital.  Family reports her meds have been provided but not sure she is taking them. Was to see Dr. Aminta Mota Monday but she called and cancelled appointment. 10 systems reviewed and negative except as noted in HPI.  
 
Today,  Hospitalist services was consulted to assume care after transfer from medical floor yesterday. Pt reports her shortness of breath has improved. Leg swelling improved. Past Medical History:  
Diagnosis Date  Anticoagulated on Coumadin 2013 S/P AVR  CAD (coronary artery disease) 2013 PCI LAD with stent placed  Cardiomyopathy ([facility] [address]  CHF (congestive heart failure) ([facility] [address] 2017  CKD (chronic kidney disease) stage 3, GFR 30-59 ml/min ([facility]) 7/10/2013  COPD (chronic obstructive pulmonary disease) ([address] 2014  Diabetes ([address]  Essential hypertension, benign 2013  HLD (hyperlipidemia)  ICD (implantable cardioverter-defibrillator) in place 10/2/2014 Biotronik single-chamber ICD implantation 10/20/14  Iron deficiency anemia due to chronic blood loss 2009  MDS (myelodysplastic syndrome) ([facility] [address] 2011 Procrit started in 11 Procrit weekly and Iron stores. 12 good response to 3 weekly procrit did not need it last time  3-7-13 Pt doing well. Just wanted a \"check-up. \" Responding to Procrit every three weeks. 13 patient has missed some injections on recently restarted hemoglobin only issue , takes oral iron iron stores each time  REJI (obstructive sleep apnea)-cpap 2014  Osteoarthritis  Osteopenia  Pulmonary hypertension ([facility] [address] 6/15/2016  Quadrantanopia  Skin defect 2018  Visual complaint 2015 Past Surgical History:  
Procedure Laterality Date [address]    COLONOSCOPY N/A 2019 COLONOSCOPY/ BMI 40 ROOM 637 performed by Rita Correa MD at [address] [place] [address], [facility] [place]  
 mechanical valve   HX  SECTION    
 HX CORONARY STENT PLACEMENT  May, 2014 STEMI with Stent placement.  HX ENDOSCOPY  2009 EGD [address] CATHETERIZATION    HX PACEMAKER  10/2/2014 Biotronik ICD  HX TUBAL LIGATION    
  IR BX BONE MARROW DIAGNOSTIC  2011 Allergies Allergen Reactions  Ferrlecit [Sodium Ferric Gluconat-Sucrose] Other (comments) Chest pain, reported MI after administration  Sulfa (Sulfonamide Antibiotics) Hives  Aspirin Nausea Only Cannot take uncoated aspirin Social History Tobacco Use  Smoking status: Former Smoker Packs/day: 0.25 Years: 42.00 Pack years: 10.50 Types: Cigarettes Start date: 10/1/1956 Last attempt to quit: 2014 Years since quittin.2  Smokeless tobacco: Never Used Substance Use Topics  Alcohol use: No  
  Alcohol/week: 0.0 standard drinks Family History Problem Relation Age of Onset  Heart Disease Mother CHF  Hypertension Mother  Kidney Disease Mother  Heart Disease Father CHF  Lung Disease Father  Diabetes Father  Cancer Father   
     prostate  Hypertension Father  Heart Attack Father  Heart Disease Maternal Aunt  Diabetes Brother  Coronary Artery Disease Other  Breast Cancer Neg Hx Immunization History Administered Date(s) Administered  Influenza High Dose Vaccine PF 10/01/2016, 2017, 10/17/2018  Influenza Vaccine 2013, 10/01/2014, 2015  Pneumococcal Conjugate (PCV-13) 2015  Pneumococcal Vaccine (Unspecified Type) 2013  TB Skin Test (PPD) Intradermal 2014, 2018, 2018, 10/06/2018, 2018, 2019, 2019 PTA Medications: 
Prior to Admission Medications Prescriptions Last Dose Informant Patient Reported? Taking? OXYGEN-AIR DELIVERY SYSTEMS 2019 at Unknown time  Yes Yes Sig: 3 L by Nasal route continuous. acetaminophen (TYLENOL) 325 mg tablet 2019 at Unknown time  Yes Yes Sig: Take 650 mg by mouth every six (6) hours as needed for Pain. albuterol (VENTOLIN HFA) 90 mcg/actuation inhaler 2019 at Unknown time  No Yes Si puffs qid prn Patient taking differently: Take 2 Puffs by inhalation every six (6) hours as needed for Wheezing or Shortness of Breath. aspirin delayed-release 81 mg tablet 2019 at Unknown time  Yes Yes Sig: Take 81 mg by mouth daily. carvedilol (COREG) 6.25 mg tablet 2019 at Unknown time  No Yes Sig: Take 1 Tab by mouth two (2) times daily (with meals). docusate sodium (COLACE) 50 mg capsule 2019 at Unknown time  Yes Yes Sig: Take 50 mg by mouth daily. epoetin jairo-epbx (RETACRIT) 10,000 unit/mL injection 2019 at Unknown time  No Yes Si mL by SubCUTAneous route Every Tues, Thur & Sat. Indications: anemia due to kidney failure Patient taking differently: 2 mL by SubCUTAneous route Every Tues, Thur & Sat. will be given at MD office  Indications: anemia due to kidney failure  
ferrous gluconate 324 mg (38 mg iron) tablet 2019 at Unknown time  No Yes Sig: TAKE 1 TAB BY MOUTH DAILY (BEFORE BREAKFAST). INDICATIONS: IRON DEFICIENCY ANEMIA Patient taking differently: Take 325 mg by mouth two (2) times daily (with meals). fluticasone (FLONASE) 50 mcg/actuation nasal spray 2019 at Unknown time  No Yes Si Sprays by Both Nostrils route daily as needed. Patient taking differently: 2 Sprays by Both Nostrils route daily as needed for Allergies. fluticasone-vilanterol (BREO ELLIPTA) 200-25 mcg/dose inhaler 2019 at Unknown time  No Yes Sig: Take 1 Puff by inhalation daily. furosemide (LASIX) 40 mg tablet 2019 at Unknown time  No Yes Sig: TAKE 1 TABLET BY MOUTH EVERY DAY  
lidocaine 4 % patch 2019 at Unknown time  No Yes Sig: Apply to right shoulder  
pantoprazole (PROTONIX) 40 mg tablet 2019 at Unknown time  Yes Yes Sig: Take 40 mg by mouth daily. sertraline (ZOLOFT) 50 mg tablet 2019 at Unknown time  No Yes Sig: Take 1 Tab by mouth daily. traZODone (DESYREL) 50 mg tablet 2019 at Unknown time  No Yes Sig: TAKE 1/2-1 TABS BY MOUTH NIGHTLY. warfarin (COUMADIN) 4 mg tablet 8/6/2019 at Unknown time  Yes Yes Sig: Take 4 mg by mouth every evening. Indications: Blood Clot caused by Artificial Heart Valve  
warfarin (COUMADIN) 5 mg tablet 8/6/2019 at Unknown time  Yes Yes Sig: Take 5 mg by mouth nightly. Facility-Administered Medications: None Objective:  
 
Patient Vitals for the past 24 hrs: 
 Temp Pulse Resp BP SpO2  
08/11/19 0800 98.6 °F (37 °C) 71 17 123/54 98 % 08/11/19 0738     96 % 08/11/19 0403     97 % 08/11/19 0320 98.7 °F (37.1 °C) 68 17 117/71 97 % 08/10/19 2303     98 % 08/10/19 2245 99 °F (37.2 °C) 70 18 127/73 98 % 08/10/19 2111     97 % 08/10/19 1905 97.8 °F (36.6 °C) 70 20 97/61 98 % 08/10/19 1630     96 % 08/10/19 1521 98.1 °F (36.7 °C) 68 20 128/79 96 % 08/10/19 1155     96 % 08/10/19 1123 98.3 °F (36.8 °C) 62 21 102/59 97 % Oxygen Therapy O2 Sat (%): 98 % (08/11/19 0800) Pulse via Oximetry: 84 beats per minute (08/11/19 0738) O2 Device: Nasal cannula;Humidifier (08/11/19 0800) O2 Flow Rate (L/min): 3 l/min (08/11/19 0800) FIO2 (%): 32 % (08/10/19 0714) Intake/Output Summary (Last 24 hours) at 8/11/2019 1122 Last data filed at 8/11/2019 0630 Gross per 24 hour Intake 480 ml Output 1350 ml Net -870 ml *Note that automatically entered I/Os may not be accurate; dependent on patient compliance with collection and accurate  by assistants. Physical Exam: 
General:    Obese currently on 3 L O2 NC Eyes:   Normal sclerae. Extraocular movements intact. ENT:  Normocephalic, atraumatic. Moist mucous membranes CV:   RRR. No murmur, rub, or gallop. Lungs:  Bibasilar crackles, b/l, No wheezing, Abdomen: Soft, nontender, nondistended. Bowel sounds normal.  
Extremities: Warm and dry. No cyanosis, bilateral lower extremity edema 1+, Neurologic: CN II-XII grossly intact. Sensation intact. Skin:     No rashes or jaundice. Psych:  Normal mood and affect. I reviewed the labs, imaging, EKGs, telemetry, and other studies done this admission. Data Review:  
Recent Results (from the past 24 hour(s)) METABOLIC PANEL, COMPREHENSIVE Collection Time: 08/11/19  6:53 AM  
Result Value Ref Range Sodium 141 136 - 145 mmol/L Potassium 4.0 3.5 - 5.1 mmol/L Chloride 95 (L) 98 - 107 mmol/L  
 CO2 39 (H) 21 - 32 mmol/L Anion gap 7 7 - 16 mmol/L Glucose 86 65 - 100 mg/dL BUN 55 (H) 8 - 23 MG/DL Creatinine 2.19 (H) 0.6 - 1.0 MG/DL  
 GFR est AA 28 (L) >60 ml/min/1.73m2 GFR est non-AA 24 (L) >60 ml/min/1.73m2 Calcium 9.1 8.3 - 10.4 MG/DL Bilirubin, total 1.0 0.2 - 1.1 MG/DL  
 ALT (SGPT) 618 (H) 12 - 65 U/L  
 AST (SGOT) 266 (H) 15 - 37 U/L Alk. phosphatase 71 50 - 136 U/L Protein, total 6.5 6.3 - 8.2 g/dL Albumin 2.9 (L) 3.2 - 4.6 g/dL Globulin 3.6 (H) 2.3 - 3.5 g/dL A-G Ratio 0.8 (L) 1.2 - 3.5 MAGNESIUM Collection Time: 08/11/19  6:53 AM  
Result Value Ref Range Magnesium 1.8 1.8 - 2.4 mg/dL PROTHROMBIN TIME + INR Collection Time: 08/11/19  6:54 AM  
Result Value Ref Range Prothrombin time 20.0 (H) 11.7 - 14.5 sec INR 1.7 All Micro Results Procedure Component Value Units Date/Time CULTURE, BLOOD [[id number]] Collected:  08/07/19 8687 Order Status:  Completed Specimen:  Blood Updated:  08/11/19 1109 Special Requests: --     
  RIGHT Antecubital 
  
  Culture result: NO GROWTH 4 DAYS     
 CULTURE, BLOOD [[id number]] Collected:  08/07/19 1959 Order Status:  Completed Specimen:  Blood Updated:  08/11/19 1109 Special Requests: --     
  LEFT 
HAND Culture result: NO GROWTH 4 DAYS Current Facility-Administered Medications Medication Dose Route Frequency  [START ON 8/12/2019] furosemide (LASIX) tablet 80 mg  80 mg Oral DAILY  warfarin (COUMADIN) tablet 1 mg  1 mg Oral QPM  
 furosemide (LASIX) tablet 40 mg  40 mg Oral ONCE  
  lidocaine 4 % patch 1 Patch  1 Patch TransDERmal Q24H  
 guaiFENesin ER (MUCINEX) tablet 1,200 mg  1,200 mg Oral Q12H  
 traMADol (ULTRAM) tablet 50 mg  50 mg Oral Q6H PRN  
 carvedilol (COREG) tablet 12.5 mg  12.5 mg Oral BID WITH MEALS  sodium chloride (NS) flush 5-40 mL  5-40 mL IntraVENous Q8H  
 sodium chloride (NS) flush 5-40 mL  5-40 mL IntraVENous PRN  
 LORazepam (ATIVAN) injection 2 mg  2 mg IntraVENous Q4H PRN  
 albuterol (PROVENTIL VENTOLIN) nebulizer solution 2.5 mg  2.5 mg Nebulization Q4H RT  
 aspirin delayed-release tablet 81 mg  81 mg Oral DAILY  docusate sodium (COLACE) capsule 100 mg  100 mg Oral DAILY  pantoprazole (PROTONIX) tablet 40 mg  40 mg Oral DAILY  sertraline (ZOLOFT) tablet 50 mg  50 mg Oral DAILY  traZODone (DESYREL) tablet 50 mg  50 mg Oral QHS PRN  
 ondansetron (ZOFRAN) injection 4 mg  4 mg IntraVENous Q4H PRN Other Studies: Xr Chest Sngl V Result Date: 8/8/2019 EXAM: XR CHEST SNGL V INDICATION: Heart failure COMPARISON: 8/7/2019 FINDINGS: A portable AP radiograph of the chest was obtained at 0423 hours. The patient is on a cardiac monitor. Lobar atelectasis of the left lower lobe. Endotracheal tubes in adequate position. The cardiac and mediastinal contours and pulmonary vascularity are normal.  The bones and soft tissues are grossly within normal limits. IMPRESSION: Lobar atelectasis of the left lower lobe. Xr Chest Sngl V Result Date: 8/7/2019 CHEST X-RAY, single portable view  8/7/2019 History: Intubated. Technique: Single frontal view of the chest. Comparison: Chest x-ray 7/28/2019 Findings: A stable left-sided intracardiac device is seen. An endotracheal tube is now seen. The tip of this is low in position located at the robert directed towards the left mainstem bronchus. Retraction of this by 3 cm should place the tip at the level of a clavicles.  Stable moderate to severe cardiomegaly is seen. Lungs are expanded without appreciable pneumothorax. No evolving consolidation, pleural effusion is seen. IMPRESSION: 1. Low position of endotracheal tube tip located at the robert with the tip directed towards the left mainstem bronchus. Recommend retraction of this by 3 cm and a repeat chest x-ray to confirm position. 2. Stable cardiomegaly. Xr Shoulder Rt Ap/lat Min 2 V Result Date: 8/7/2019 THREE-VIEW RIGHT SHOULDER, TWO-VIEW RIGHT HUMERUS, TWO-VIEW RIGHT FOREARM: CLINICAL HISTORY:  Right upper extremity pain after fall. COMPARISON:  None. FINDINGS:  No definite fracture, malalignment, or oly bone destruction is evident. Right shoulder and humerus of June 6, 2019. Impaction injury is again noted at the medial aspect of the junction of the humeral head and neck. New calcification projecting over the central and medial space suggests the possibility of calcific tendinitis of the rotator cuff. No persistent radiopaque foreign body is seen. IMPRESSION:  1. No definite acute bony abnormality identified. 2.  Probable chronic impaction at the medial aspect of the junction of the humeral head and neck 3. Probable calcific tendinitis of the rotator cuff. Xr Humerus Rt Result Date: 8/7/2019 THREE-VIEW RIGHT SHOULDER, TWO-VIEW RIGHT HUMERUS, TWO-VIEW RIGHT FOREARM: CLINICAL HISTORY:  Right upper extremity pain after fall. COMPARISON:  None. FINDINGS:  No definite fracture, malalignment, or oly bone destruction is evident. Right shoulder and humerus of June 6, 2019. Impaction injury is again noted at the medial aspect of the junction of the humeral head and neck. New calcification projecting over the central and medial space suggests the possibility of calcific tendinitis of the rotator cuff. No persistent radiopaque foreign body is seen. IMPRESSION:  1. No definite acute bony abnormality identified.  2. Probable chronic impaction at the medial aspect of the junction of the humeral head and neck 3. Probable calcific tendinitis of the rotator cuff. Xr Forearm Rt Ap/lat Result Date: 8/7/2019 THREE-VIEW RIGHT SHOULDER, TWO-VIEW RIGHT HUMERUS, TWO-VIEW RIGHT FOREARM: CLINICAL HISTORY:  Right upper extremity pain after fall. COMPARISON:  None. FINDINGS:  No definite fracture, malalignment, or oly bone destruction is evident. Right shoulder and humerus of June 6, 2019. Impaction injury is again noted at the medial aspect of the junction of the humeral head and neck. New calcification projecting over the central and medial space suggests the possibility of calcific tendinitis of the rotator cuff. No persistent radiopaque foreign body is seen. IMPRESSION:  1. No definite acute bony abnormality identified. 2.  Probable chronic impaction at the medial aspect of the junction of the humeral head and neck 3. Probable calcific tendinitis of the rotator cuff. Xr Chest [facility] Result Date: 8/7/2019 CHEST X-RAY, single portable view  8/7/2019 History: Tube repositioning. Technique: Single frontal view of the chest. Comparison: Chest x-ray 8/7/2019 Findings: A stable left-sided intracardiac device is seen. The patient is intubated. The endotracheal tube has been repositioned and is now in good position with the line 3 cm above the robert. Stable cardiomegaly is seen. Lungs are expanded without appreciable pneumothorax. No evolving consolidation, or pleural effusion is seen. IMPRESSION: 1. Good position of endotracheal tube. 2. Stable cardiomegaly. Assessment and Plan:  
 
Hospital Problems as of 8/11/2019 Date Reviewed: 8/9/2019 Codes Class Noted - Resolved POA Acute renal failure superimposed on chronic kidney disease ([facility] [address] ICD-10-CM: N17.9, N18.9 ICD-9-CM: 584.9, 585.9  8/8/2019 - Present No  
   
 * (Principal) Acute on chronic respiratory failure with hypoxia (HCC) ICD-10-CM: Q10.43 
ICD-9-CM: 518.84, 799.02  8/7/2019 - Present Yes Severe obesity ([facility] [address] ICD-10-CM: E66.01 
ICD-9-CM: 278.01  1/15/2019 - Present Yes Acute exacerbation of CHF (congestive heart failure) (HCC) ICD-10-CM: I50.9 ICD-9-CM: 428.0  11/14/2018 - Present Yes Debility ICD-10-CM: R53.81 ICD-9-CM: 799.3  9/8/2018 - Present Yes Chronic respiratory failure with hypoxia [facility]) ICD-10-CM: J96.11 
ICD-9-CM: 518.83, 799.02  9/7/2018 - Present Yes Type 2 diabetes mellitus with nephropathy (HCC) (Chronic) ICD-10-CM: E11.21 
ICD-9-CM: 250.40, 583.81  12/18/2017 - Present Yes S/P AVR (aortic valve replacement) (Chronic) ICD-10-CM: Z95.2 ICD-9-CM: V43.3  Unknown - Present Yes Overview Signed 2/23/2016 11:04 AM by Karen Rader Mechanical/Artific Cardiomyopathy ([facility] [address] (Chronic) ICD-10-CM: I42.9 ICD-9-CM: 425.4  Unknown - Present Yes  
   
 ICD (implantable cardioverter-defibrillator) in place ICD-10-CM: Z95.810 ICD-9-CM: V45.02  10/2/2014 - Present Yes Overview Signed 10/2/2014  4:58 PM by Yaritza Ritchieronik single-chamber ICD implantation 10/20/14 COPD (chronic obstructive pulmonary disease) (HCC) (Chronic) ICD-10-CM: J44.9 ICD-9-CM: 645  4/2/2014 - Present Yes Overview Signed 10/6/2018  8:56 PM by Jessy Camacho NP  
  HOME OXYGEN 3 LITERS 
  
  
   
 REJI (obstructive sleep apnea)-cpap (Chronic) ICD-10-CM: O16.66 
ICD-9-CM: 327.23  4/2/2014 - Present Yes Overview Signed 8/7/2019 12:50 PM by Malorie Hassan NP Home Trilogy CKD (chronic kidney disease) stage 4, GFR 15-29 ml/min ([facility]) ICD-10-CM: N18.4 ICD-9-CM: 585.4  7/10/2013 - Present Yes Plan: This is a 67y Female with: 
 
Acute on chronic hypoxemic respiratory failure POA s/p intubation mechanical ventilation extubated 8/8 currently on NC 3-4L. Trilogy at night. Secondary to Acute on chronic systolic and diastolic CHF exacerbation POA. Pulmonary and Cardiology on board. Appreciate recs. Lasix IV bid switched to PO lasix per Cardiology recs. Acute on chronic systolic and diastolic CHF Exacerbation POA: 
IV lasix switched to PO lasix. Continue fluid restrictions, low sodium diet, daily weights. Coreg, Not on ACE/ARB secondary to FELIZ. Cardiology signed off. BNP of 1822. FELIZ ON CKD stage 4 POA: improving From hypervolemia from CHF exacerbation POA. Cr is 2.19 this am improved from 3.12 on admission. (Baselien Cr of 1.5) Continue PO Lasix. Transaminitis: POA improving LFTs  and  Continue Po Lasix. S/P Aortic valve replacement: 
INR is 1.7 this am. 
Continue Coumadin. Pharmacy to dose Coumadin. Chronic hypoxemic respiratory failure from COPD on  3L o2 at baseline: 
Not in COPD exacerbation. REJI: 
Trilogy at night Type 2 DM with nephropathy: 
Not on home meds. Accuchecks prn DVT Ppx: Coumadin Code status DNR 
DPOA Pt's daughter Juan Jose Vasquez [phone] DISPO Planning: Anticipate discharge with Palliative care when medically cleared (anticipate in 24-48 hours) Thank you for allowing us the pleasure of participating in the care of this pt. Will assume care of the pt.   
 
Signed: 
Kaur Wright MD

## 2019-08-11 NOTE — ROUTINE PROCESS
CHF teaching started post introduction to pt/family; aware of diagnosis. Planner/scale @ BS and will follow. Smoking/ ETOH/Illicit drug use cessation and maintain a healthy weight covered. Pt/family aware that I can not prescribe nor adjust  medications: 15mins Palliative Care score: seen ACP on admission Start 2L/D Fluid restriction/ cardiac diet CHF teaching continues to pt/family. Emphasis on taking prescription meds as ordered, to keep F/U appts and to call MD STAT if any of the following occur: ? If you gain 2 lbs in one day or 5 lbs in a week, and short of breath. ? If you can not lay flat without developing short of breath or rapid breathing at night; or if it wakes you up. Develop a cough or wheezing.

## 2019-08-11 NOTE — PROGRESS NOTES
Pt is resting quietly in bed. Respirations even and unlabored. No s/s of distress. Call light within reach. Bed low and locked. Will give report to Tyree Velez RN.

## 2019-08-11 NOTE — PROGRESS NOTES
Problem: Falls - Risk of 
Goal: *Absence of Falls Description Document Tyesha Ramos Fall Risk and appropriate interventions in the flowsheet. Outcome: Progressing Towards Goal 
Note:  
Fall Risk Interventions: 
Mobility Interventions: OT consult for ADLs, Communicate number of staff needed for ambulation/transfer Mentation Interventions: Adequate sleep, hydration, pain control Medication Interventions: Evaluate medications/consider consulting pharmacy, Teach patient to arise slowly, Patient to call before getting OOB Elimination Interventions: Call light in reach, Patient to call for help with toileting needs, Toileting schedule/hourly rounds History of Falls Interventions: Consult care management for discharge planning Problem: Pressure Injury - Risk of 
Goal: *Prevention of pressure injury Description Document Bahman Scale and appropriate interventions in the flowsheet. Outcome: Progressing Towards Goal 
Note:  
Pressure Injury Interventions: 
Sensory Interventions: Assess changes in LOC, Assess need for specialty bed, Check visual cues for pain, Discuss PT/OT consult with provider, Keep linens dry and wrinkle-free, Maintain/enhance activity level, Minimize linen layers, Monitor skin under medical devices, Pressure redistribution bed/mattress (bed type), Turn and reposition approx. every two hours (pillows and wedges if needed), Pad between skin to skin Moisture Interventions: Absorbent underpads, Apply protective barrier, creams and emollients, Check for incontinence Q2 hours and as needed Activity Interventions: Pressure redistribution bed/mattress(bed type), PT/OT evaluation, Increase time out of bed Mobility Interventions: Assess need for specialty bed, Pressure redistribution bed/mattress (bed type), PT/OT evaluation Nutrition Interventions: Document food/fluid/supplement intake Friction and Shear Interventions: Apply protective barrier, creams and emollients, Foam dressings/transparent film/skin sealants

## 2019-08-11 NOTE — PROGRESS NOTES
Date of Outreach Update: 
Vianca Bloom was seen and assessed. MEWS Score: 1 (08/11/19 1204) Vitals:  
 08/11/19 0738 08/11/19 0800 08/11/19 1149 08/11/19 1204 BP:  123/54  109/50 Pulse:  71  63 Resp:  17  17 Temp:  98.6 °F (37 °C)  99 °F (37.2 °C) SpO2: 96% 98% 96% Weight:      
Height:      
  
 
 Pain Assessment Pain Intensity 1: 0 (08/11/19 1425) Pain Location 1: Shoulder Pain Intervention(s) 1: Medication (see MAR) Patient Stated Pain Goal: 0 Previous Outreach assessment has been reviewed. There have been no significant clinical changes since the completion of the last dated Outreach assessment. Will continue to follow up per outreach protocol. Signed By:   Yousif Butler RN   August 11, 2019 3:36 PM

## 2019-08-11 NOTE — PROGRESS NOTES
100 [facility] OUTREACH NURSE PROGRESS REPORT SUBJECTIVE: Called to assess patient secondary to transfer from ICU. MEWS Score: 1 (08/10/19 1521) Vitals:  
 08/10/19 1521 08/10/19 1630 08/10/19 1905 08/10/19 2111 BP: 128/79  97/61 Pulse: 68  70 Resp: 20  20 Temp: 98.1 °F (36.7 °C)  97.8 °F (36.6 °C) SpO2: 96% 96% 98% 97% Weight:      
Height:      
  
 
 
LAB DATA: 
 
Recent Labs 08/09/19 
1350 08/09/19 
0339 08/08/19 
1441  143 141  
K 3.9 4.6 4.3 CL 97* 96* 95* CO2 40* 39* 38* AGAP 5* 8 8 * 90 131* BUN 69* 74* 77* CREA 2.71* 2.87* 3.14* GFRAA 22* 21* 19* GFRNA 18* 17* 16*  
CA 9.0 9.6 9.9 MG 1.9  --   --   
ALB  --  2.8* 3.3 TP  --  6.2* 6.9 GLOB  --  3.4 3.6* AGRAT  --  0.8* 0.9* ALT  --  1,007* 1,153* Recent Labs 08/09/19 
1211 08/08/19 
7840 WBC 7.1 9.8 HGB 8.1* 8.9* HCT 26.6* 28.2*  
 179 OBJECTIVE: On arrival to room, I found patient to be resting in bed. Pain Assessment Pain Intensity 1: 0 (08/10/19 1516) Pain Location 1: Shoulder Pain Intervention(s) 1: Emotional support, Repositioned Patient Stated Pain Goal: 0 
 
 
ASSESSMENT:  Patient awakens to voice. No visible signs of distress. Breathing even and unlabored. O2 sat 97% on 3L NC. Complains of R shoulder pain, 9/10. Primary RN notified and will contact MD. PLAN:  Will continue to monitor per outreach protocol.

## 2019-08-11 NOTE — PROGRESS NOTES
Warfarin dosing per pharmacist 
 
cJ Abdul Mckayla Almeida is a 68 y.o. female. Height: 5' 2\" (157.5 cm)    Weight: 93.7 kg (206 lb 9.1 oz) Indication:  Mechanical AVR Goal INR:  2-3 Home dose:  4 mg hs Risk factors or significant drug interactions:  -- Other anticoagulants:  --- Daily Monitoring Date  INR     Warfarin dose  HGB              Notes 8/7  2.5  ---   8.7 
8/8  2.7  1 mg   8.9 
8/9  2.9  Hold   8.1 8/10  2.4  ---   --- 
8/11  1.7  1 mg + 1 mg  --- Pharmacy consulted to assist in patient's warfarin dosing. Pt was just recently admitted and discharged on 4 mg hs. INR 1.7. Warfarin was to restart with 1 mg yesterday but not ordered. Restart today with 1 mg now and then 1 mg this evening. Following daily INR. Thank you, Alfonso Franco, Pharm. D. Clinical Pharmacist 
[phone]

## 2019-08-11 NOTE — PROGRESS NOTES
8/11/2019 10:22 AM 
 
Admit Date: 8/7/2019 Admit Diagnosis: Acute on chronic respiratory failure with [facility] [J96.21] Subjective: No cp or sob Objective:  
  
Visit Vitals /54 Pulse 71 Temp 98.6 °F (37 °C) Resp 17 Ht 5' 2\" (1.575 m) Wt 93.7 kg (206 lb 9.1 oz) SpO2 98% BMI 37.78 kg/m² Physical Exam: 
Essie Means, Well Nourished, No Acute Distress, Alert & Oriented x 3, appropriate mood. Neck- supple, no JVD 
CV- irregular rate and rhythm no MRG Lung- clear bilaterally Abd- soft, nontender, nondistended Ext- no edema bilaterally. Skin- warm and dry Data Review:  
Recent Labs 08/11/19 
6957 08/11/19 
1879  08/09/19 
9950 NA  --  141   < > 143 K  --  4.0   < > 4.6 BUN  --  55*   < > 74* CREA  --  2.19*   < > 2.87* WBC  --   --   --  7.1 HGB  --   --   --  8.1* HCT  --   --   --  26.6*  
PLT  --   --   --  169 INR 1.7  --    < > 2.9  
 < > = values in this interval not displayed. Assessment/Plan:  
 
Principal Problem: 
  Acute on chronic respiratory failure with hypoxia ([facility] (8/7/2019)Resolved. Continue current meds Change lasix to 80mg every day- will sign off- OP cards fu Active Problems: 
  CKD (chronic kidney disease) stage 4, GFR 15-29 ml/min ([facility]) (7/10/2013)Improved with current therapy. Will continue medications COPD (chronic obstructive pulmonary disease) ([facility] (4/2/2014) Overview: HOME OXYGEN 3 LITERS 
 
  REJI (obstructive sleep apnea)-cpap (4/2/2014) Overview: Home Trilogy ICD (implantable cardioverter-defibrillator) in place (10/2/2014) Overview: Bi[employer]ronik single-chamber ICD implantation 10/20/14 S/P AVR (aortic valve replacement) () Overview: Mechanical/Artific Cardiomyopathy ([address] () Type 2 diabetes mellitus with nephropathy ([facility] (12/18/2017) Chronic respiratory failure with hypoxia ([facility] (9/7/2018) Debility (9/8/2018) Acute exacerbation of CHF (congestive heart failure) ([facility] (11/14/2018) Severe obesity ([facility] (1/15/2019) Acute renal failure superimposed on chronic kidney disease [facility] (8/8/2019) VT- resolved on higher dose coreg- will sign off

## 2019-08-11 NOTE — PROGRESS NOTES
100 [facility] OUTREACH NURSE PROGRESS REPORT SUBJECTIVE: Called to assess patient secondary to transfer from ICU. MEWS Score: 1 (08/11/19 0320) Vitals:  
 08/11/19 0403 08/11/19 0630 08/11/19 0738 08/11/19 0800 BP:    123/54 Pulse:    71 Resp:    17 Temp:    98.6 °F (37 °C) SpO2: 97%  96% 98% Weight:  93.7 kg (206 lb 9.1 oz) Height:      
  
 
LAB DATA: 
 
Recent Labs 08/11/19 
3225 08/09/19 
1350 08/09/19 
0339 08/08/19 
1441  142 143 141  
K 4.0 3.9 4.6 4.3 CL 95* 97* 96* 95* CO2 39* 40* 39* 38* AGAP 7 5* 8 8 GLU 86 194* 90 131* BUN 55* 69* 74* 77* CREA 2.19* 2.71* 2.87* 3.14* GFRAA 28* 22* 21* 19* GFRNA 24* 18* 17* 16*  
CA 9.1 9.0 9.6 9.9 MG 1.8 1.9  --   --   
ALB 2.9*  --  2.8* 3.3 TP 6.5  --  6.2* 6.9 GLOB 3.6*  --  3.4 3.6* AGRAT 0.8*  --  0.8* 0.9* *  --  1,007* 1,153* Recent Labs 08/09/19 
1872 WBC 7.1 HGB 8.1* HCT 26.6*  
 OBJECTIVE: On arrival to room, I found patient to be resting in bed. Pain Assessment Pain Intensity 1: 0 (08/11/19 0310) Pain Location 1: Shoulder Pain Intervention(s) 1: Medication (see MAR) Patient Stated Pain Goal: 0 
 
 
ASSESSMENT:  Patient alert and oriented. Respirations even and unlabored. SaO2 98% on 3L NC. Denies SOB. Reports productive cough. VS, labs, and progress notes reviewed. No new concerns identified. PLAN:  Will continue to follow per outreach protocol.

## 2019-08-11 NOTE — PROGRESS NOTES
Bedside report received from  [facility]. Assessment completed. Bed is in low and locked position, with floor free of clutter. Respirations even and unlabored. Breath sounds rhonchi and coarse. Alert and Oriented x4. NC at 3 L O2. Family at bedside. Call light within reach.

## 2019-08-12 NOTE — PROGRESS NOTES
Pt is resting quietly in bed. Respirations even and unlabored. No s/s of distress. Will give report to oncoming 7a-7p RN.

## 2019-08-12 NOTE — PROGRESS NOTES
Warfarin dosing per pharmacist 
 
Valorie Black Mariam Sever is a 68 y.o. female. Height: 5' 2\" (157.5 cm)    Weight: 93.1 kg (205 lb 4.8 oz) Indication:  Mechanical AVR Goal INR:  2-3 Home dose:  4 mg hs Risk factors or significant drug interactions:  -- Other anticoagulants:  --- Daily Monitoring Date  INR     Warfarin dose  HGB              Notes 8/7  2.5  ---   8.7 
8/8  2.7  1 mg   8.9 
8/9  2.9  Hold   8.1 8/10  2.4  ---   --- 
8/11  1.7  1 mg + 1 mg  --- 
8/12                 1.7                   1 mg                            7.9 Pharmacy consulted to assist in patient's warfarin dosing. Pt was just recently admitted and discharged on 4 mg hs. INR = 1.7 today. Warfarin was restarted yesterday. Suggest to continue current dosing regimen. Will continue to follow daily INR. Thank you, Beatriz Aviles, PharmD

## 2019-08-12 NOTE — PROGRESS NOTES
Bedside report received from  [facility]. Assessment completed. Bed is in low and locked position, with floor free of clutter. Respirations even and unlabored. Breath sounds coarse and diminished. Alert and Oriented x4. NC at 3 L O2. Call light within reach.

## 2019-08-12 NOTE — PROGRESS NOTES
Date of Outreach Update: 
Vianca Aldridge was seen and assessed. Previous Outreach assessment has been reviewed. There have been no significant clinical changes since the completion of the last dated Outreach assessment. Patient awake and resting quietly. Breathing even and unlabored on NC. No visible signs of distress. Discussed with primary RN who has no concerns at this time. Will continue to follow up per outreach protocol. Signed By:   Allie Wu   August 12, 2019 5:14 AM

## 2019-08-12 NOTE — PROGRESS NOTES
Bedside report received from night nurse [address] [place] [address]. Assessment done as noted  Respiration even and unlabored 20/min; denies pain or nausea at present. Thinks Trilogy settings are not \"right\". pt states \"it blows off too much\". Reports that she did wear it up until 3 AM this morning. RT made aware. no family at bedside at present. Door open. Encouraged to call with needs.

## 2019-08-12 NOTE — PROGRESS NOTES
CM spoke with patient's daughter, Carson Yu. Carson Yu explained that the family was considering hospice if patient could keep  aides in the home to replace the [facility] aides that she would be losing if she went with hospice. Carson Yu requested that CM call [facility] with [facility]. After discussing the needs of the patient with [facility] it was decided that it is in the best interest of the patient to discharge with [address] [address] and [facility] services. In the future if patient needs hospice, [facility] will try to get aides to assist patient. CM will follow up with Carson Yu tomorrow.

## 2019-08-12 NOTE — PROGRESS NOTES
Critical Care Daily Progress Note: 8/12/2019 [address] Admission Date: 8/7/2019 The patient's chart is reviewed and the patient is discussed with the staff. 68 y.o. AAF with chronic respiratory failure on 3lpm & nocturnal Trilogy who presents with respiratory distress.  Was found by family members \"not looking good\", swollen and EMS was called. A DNR was  in place but family felt she was \"suffering\" , O2 sat 35% and they allowed nasal intubation.  Received Versed in route and placed on Versed drip.   Was discharged home Friday after syncopal type episode. Jaylin Rodriguez been diagnosed with small bowel mass, Heparin bridge for AVR and endoscopy was planned but cancelled per patient desires. Opal Oliver is being admitted to the ICU on vent support for acute hypoxic respiratory failure. 
 Mobile with walker and overall debilitated.  She fell Monday and EMS was called to the home twice but did not feel she needed to be brought to hospital.  
 Chronic Medical:  Systolic/diastolic heart failure, CAD with previous stent, cardiomyopathy with EF 20-25%, AVR with mechanical valve in 2005 and on Coumadin, ICD, HTN, HLD, COPD, pulmonary hypertension, OA, DM, MDS, former tobacco abuse-quit 2014. Was extubated 8/8 to home Trilogy (new machine obtained from [employer]) Subjective:  
 
Feeling better. Changed to daily lasix and only mildly net negative, but feels better. Current Facility-Administered Medications Medication Dose Route Frequency  furosemide (LASIX) tablet 80 mg  80 mg Oral DAILY  warfarin (COUMADIN) tablet 1 mg  1 mg Oral QPM  
 lidocaine 4 % patch 1 Patch  1 Patch TransDERmal Q24H  
 guaiFENesin ER (MUCINEX) tablet 1,200 mg  1,200 mg Oral Q12H  
 traMADol (ULTRAM) tablet 50 mg  50 mg Oral Q6H PRN  
 carvedilol (COREG) tablet 12.5 mg  12.5 mg Oral BID WITH MEALS  sodium chloride (NS) flush 5-40 mL  5-40 mL IntraVENous Q8H  
  sodium chloride (NS) flush 5-40 mL  5-40 mL IntraVENous PRN  
 LORazepam (ATIVAN) injection 2 mg  2 mg IntraVENous Q4H PRN  
 albuterol (PROVENTIL VENTOLIN) nebulizer solution 2.5 mg  2.5 mg Nebulization Q4H RT  
 aspirin delayed-release tablet 81 mg  81 mg Oral DAILY  docusate sodium (COLACE) capsule 100 mg  100 mg Oral DAILY  pantoprazole (PROTONIX) tablet 40 mg  40 mg Oral DAILY  sertraline (ZOLOFT) tablet 50 mg  50 mg Oral DAILY  traZODone (DESYREL) tablet 50 mg  50 mg Oral QHS PRN  
 ondansetron (ZOFRAN) injection 4 mg  4 mg IntraVENous Q4H PRN Review of Systems Constitutional:  negative for fever, chills, sweats Cardiovascular:  Trace LE edema, negative for chest pain, palpitations, syncope Gastrointestinal:  negative for dysphagia, reflux, vomiting, diarrhea, abdominal pain, or melena Neurologic:  negative for focal weakness, numbness, headache Objective:  
 
Vitals:  
 08/12/19 0000 08/12/19 0310 08/12/19 0315 08/12/19 1249 BP:    111/58 Pulse:    69 Resp:    18 Temp:    98.1 °F (36.7 °C) SpO2: 99%  98% 96% Weight:  205 lb 4.8 oz (93.1 kg) Height:      
 
 
Intake/Output Summary (Last 24 hours) at 8/12/2019 0801 Last data filed at 8/11/2019 2235 Gross per 24 hour Intake 480 ml Output 650 ml Net -170 ml Physical Exam:         
Constitutional:  the patient is obese and in no acute distress, NC 3L sat 96% EENMT:  Sclera clear, pupils equal, oral mucosa moist 
Respiratory: clear bilaterally Cardiovascular:  RRR without M,G,R 
Gastrointestinal: soft and non-tender; with positive bowel sounds. Musculoskeletal: warm without cyanosis. There is trace lower extremity edema. Skin:  no jaundice or rashes, no wounds Neurologic: no gross neuro deficits Psychiatric:  alert and oriented x 3 CXR: None new 
8/9/19:  Left lower lobe lobar atelectasis unchanged. Cardiomegaly unchanged. LAB No results for input(s): GLUCPOC in the last 72 hours. No lab exists for component: [place] Recent Labs 08/11/19 
6676 08/10/19 
2779 INR 1.7 2.4 Recent Labs 08/11/19 
0653 08/09/19 
1350  142  
K 4.0 3.9 CL 95* 97* CO2 39* 40* GLU 86 194* BUN 55* 69* CREA 2.19* 2.71* MG 1.8 1.9  
CA 9.1 9.0 ALB 2.9*  --   
TBILI 1.0  --   
*  --   
SGOT 266*  -- No results for input(s): LCAD, LAC in the last 72 hours. Recent Labs 08/10/19 
0327 PHI 7.424 PCO2I 68.2*  
PO2I 150* HCO3I 44.6* Assessment & Plan:  (Medical Decision Making) Vianca Mckay is a 63yoF with cardiomyopathy, pulmonary hypertension, severe chronic respiratory failure who was admitted with volume overload, elevated LFTs, FELIZ, DM, MDS Hospital Problems  Date Reviewed: 8/9/2019 Codes Class Noted POA * (Principal) Acute on chronic respiratory failure with hypoxia (HCC) ICD-10-CM: J96.21 
ICD-9-CM: 518.84, 799.02  8/7/2019 Yes Used Trilogy last night but became hypoxic and changed to NC--DME to eval  
 Acute renal failure superimposed on chronic kidney disease ([facility] ICD-10-CM: N17.9, N18.9 ICD-9-CM: 584.9, 585.9  8/8/2019 No  
 creatinine Severe obesity ([facility] ICD-10-CM: E66.01 
ICD-9-CM: 278.01  1/15/2019 Yes  
 chronic Acute exacerbation of CHF (congestive heart failure) (HCC) ICD-10-CM: I50.9 ICD-9-CM: 428.0  11/14/2018 Yes Debility ICD-10-CM: R53.81 ICD-9-CM: 799.3  9/8/2018 Yes  
 chronic Chronic respiratory failure with hypoxia [facility]) ICD-10-CM: J96.11 
ICD-9-CM: 518.83, 799.02  9/7/2018 Yes  
 chronic Type 2 diabetes mellitus with nephropathy (HCC) (Chronic) ICD-10-CM: E11.21 
ICD-9-CM: 250.40, 583.81  12/18/2017 Yes Chronic--ranges  S/P AVR (aortic valve replacement) (Chronic) ICD-10-CM: Z95.2 ICD-9-CM: V43.3  Unknown Yes Overview Signed 2/23/2016 11:04 AM by Nicole Mohan Mechanical/Artific Chronic--on Coumadin  
 Cardiomyopathy (HCC) (Chronic) ICD-10-CM: I42.9 ICD-9-CM: 425.4  Unknown Yes  
 chronic  
 ICD (implantable cardioverter-defibrillator) in place ICD-10-CM: Z95.810 ICD-9-CM: V45.02  10/2/2014 Yes Overview Signed 10/2/2014  4:58 PM by Lisandra Cruz Biotroniblaise single-chamber ICD implantation 10/20/14 
  
  
 chronic COPD (chronic obstructive pulmonary disease) ([facility]) (Chronic) ICD-10-CM: J44.9 ICD-9-CM: 254  4/2/2014 Yes Overview Signed 10/6/2018  8:56 PM by Ely Alamo NP  
  HOME OXYGEN 3 LITERS Chronic--rare wheeze REJI (obstructive sleep apnea)-cpap (Chronic) ICD-10-CM: G47.33 
ICD-9-CM: 327.23  4/2/2014 Yes Overview Signed 8/7/2019 12:50 PM by Berkley Dawson NP Home Trilogy DME made some changes to Trilogy yesterday CKD (chronic kidney disease) stage 4, GFR 15-29 ml/min ([facility]) ICD-10-CM: N18.4 ICD-9-CM: 585.4  7/10/2013 Yes Creatinine down 2.71  
  
 
--changed to daily lasix yesterday, renal function improving 
--PT/OT 
--Nocturnal trilogy, 3L O2 during day 
--anticipate discharge with [facility] in 1-2 days when creatinine close to 1.5 baseline and tolerating oral diuretics 
--patient concerned about Hg at discharge 
--coumadin dosing per pharmacy for INR goal, anticoagulation Christina Tello MD

## 2019-08-12 NOTE — PROGRESS NOTES
PT note: 
 
Pt declined PT this afternoon. She states she does not feel well and will not participate. She declined in bed and out of bed activity. Will follow up with pt later as schedule allows.  
 
Virginia Hunt, PTA

## 2019-08-12 NOTE — PROGRESS NOTES
Hospitalist Note Admit Date:  2019  9:57 AM  
Name:  Omar Thomson Age:  68 y.o. 
:  1950 MRN:  [id number] PCP:  Jagdeep Mojica MD 
Treatment Team: Attending Provider: Rob Villanueva MD; Care Manager: Angeles Love RN; Utilization Review: Wilhelminia Severance; Consulting Provider: Radha Wyatt MD; Primary Nurse: Sarita Quijano RN 
 
HPI/Subjective:  
Patient is a 68 y.o.  female with chronic respiratory failure on 3lpm & nocturnal Trilogy who presents with respiratory distress. Was found by family members this morning with her mask off, \"not looking good\", swollen and EMS was called. Family states she called about 5-6PM last night and reported Trilogy was alarming and was placing herself on O2. Unknown how long mask was off. Daughter said this morning she was conversant but then \"went out\". From last admission she has a DNR in place but family felt she was \"suffering\" today, O2 sat per family was 35% and they allowed nasal intubation. Per EMS O2 sat was 50%--was not responsive. She received Versed in route and in the ER then placed on Versed drip as she began to move some. Was just discharged home Friday after hospital stay for syncopal type episode. Had been diagnosed with small bowel mass, Heparin bridge for AVR and endoscopy was planned but cancelled per patient desires. She is being admitted to the ICU on vent support for acute hypoxic respiratory failure. 
  
Was doing fairly well over the weekend. She is debilitated, slightly mobile with walker and overall debilitated. She fell Monday and EMS was called to the home twice but did not feel she needed to be brought to hospital.  Family reports her meds have been provided but not sure she is taking them. Was to see Dr. Gerson Kapoor Monday but she called and cancelled appointment. 10 systems reviewed and negative except as noted in HPI.  Hospitalist services was consulted to assume care after transfer from medical floor yesterday. Pt reports her shortness of breath has improved. Leg swelling improved.  Pt reports no fever, no cough, shortness of breath has improved, no chest pain, no palpitations,    
 
Past Medical History:  
Diagnosis Date  Anticoagulated on Coumadin 2013 S/P AVR  CAD (coronary artery disease) 2013 PCI LAD with stent placed  Cardiomyopathy ([address]  CHF (congestive heart failure) ([address] 2017  CKD (chronic kidney disease) stage 3, GFR 30-59 ml/min ([facility]) 7/10/2013  COPD (chronic obstructive pulmonary disease) ([address] 2014  Diabetes ([address]  Essential hypertension, benign 2013  HLD (hyperlipidemia)  ICD (implantable cardioverter-defibrillator) in place 10/2/2014 Bi[employer]ronik single-chamber ICD implantation 10/20/14  Iron deficiency anemia due to chronic blood loss 2009  MDS (myelodysplastic syndrome) ([address] 2011 Procrit started in 11 Procrit weekly and Iron stores. 12 good response to 3 weekly procrit did not need it last time  3- Pt doing well. Just wanted a \"check-up. \" Responding to Procrit every three weeks. 13 patient has missed some injections on recently restarted hemoglobin only issue , takes oral iron iron stores each time  REJI (obstructive sleep apnea)-cpap 2014  Osteoarthritis  Osteopenia  Pulmonary hypertension ([address] 6/15/2016  Quadrantanopia  Skin defect 2018  Visual complaint 2015 Past Surgical History:  
Procedure Laterality Date [address]    COLONOSCOPY N/A 2019 COLONOSCOPY/ BMI 40 ROOM 637 performed by Oleg Thompson MD at [address] [place] [address], [facility]  
 mechanical valve   HX  SECTION    
 HX CORONARY STENT PLACEMENT  May, 2014 STEMI with Stent placement.  HX ENDOSCOPY  2009 EGD [address] CATHETERIZATION    HX PACEMAKER  10/2/2014 Bi[employer]ronik ICD  HX TUBAL LIGATION    
 IR BX BONE MARROW DIAGNOSTIC  2011 Allergies Allergen Reactions  Ferrlecit [Sodium Ferric Gluconat-Sucrose] Other (comments) Chest pain, reported MI after administration  Sulfa (Sulfonamide Antibiotics) Hives  Aspirin Nausea Only Cannot take uncoated aspirin Social History Tobacco Use  Smoking status: Former Smoker Packs/day: 0.25 Years: 42.00 Pack years: 10.50 Types: Cigarettes Start date: 10/1/1956 Last attempt to quit: 2014 Years since quittin.2  Smokeless tobacco: Never Used Substance Use Topics  Alcohol use: No  
  Alcohol/week: 0.0 standard drinks Family History Problem Relation Age of Onset  Heart Disease Mother CHF  Hypertension Mother  Kidney Disease Mother  Heart Disease Father CHF  Lung Disease Father  Diabetes Father  Cancer Father   
     prostate  Hypertension Father  Heart Attack Father  Heart Disease Maternal Aunt  Diabetes Brother  Coronary Artery Disease Other  Breast Cancer Neg Hx Immunization History Administered Date(s) Administered  Influenza High Dose Vaccine PF 10/01/2016, 2017, 10/17/2018  Influenza Vaccine 2013, 10/01/2014, 2015  Pneumococcal Conjugate (PCV-13) 2015  Pneumococcal Vaccine (Unspecified Type) 2013  TB Skin Test (PPD) Intradermal 2014, 2018, 2018, 10/06/2018, 2018, 2019, 2019 PTA Medications: 
Prior to Admission Medications Prescriptions Last Dose Informant Patient Reported? Taking? OXYGEN-AIR DELIVERY SYSTEMS 2019 at Unknown time  Yes Yes Sig: 3 L by Nasal route continuous. acetaminophen (TYLENOL) 325 mg tablet 2019 at Unknown time  Yes Yes Sig: Take 650 mg by mouth every six (6) hours as needed for Pain. albuterol (VENTOLIN HFA) 90 mcg/actuation inhaler 2019 at Unknown time  No Yes Si puffs qid prn Patient taking differently: Take 2 Puffs by inhalation every six (6) hours as needed for Wheezing or Shortness of Breath. aspirin delayed-release 81 mg tablet 2019 at Unknown time  Yes Yes Sig: Take 81 mg by mouth daily. carvedilol (COREG) 6.25 mg tablet 2019 at Unknown time  No Yes Sig: Take 1 Tab by mouth two (2) times daily (with meals). docusate sodium (COLACE) 50 mg capsule 2019 at Unknown time  Yes Yes Sig: Take 50 mg by mouth daily. epoetin jairo-epbx (RETACRIT) 10,000 unit/mL injection 2019 at Unknown time  No Yes Si mL by SubCUTAneous route Every Tues, Thur & Sat. Indications: anemia due to kidney failure Patient taking differently: 2 mL by SubCUTAneous route Every Tues, Thur & Sat. will be given at MD office  Indications: anemia due to kidney failure  
ferrous gluconate 324 mg (38 mg iron) tablet 2019 at Unknown time  No Yes Sig: TAKE 1 TAB BY MOUTH DAILY (BEFORE BREAKFAST). INDICATIONS: IRON DEFICIENCY ANEMIA Patient taking differently: Take 325 mg by mouth two (2) times daily (with meals). fluticasone (FLONASE) 50 mcg/actuation nasal spray 2019 at Unknown time  No Yes Si Sprays by Both Nostrils route daily as needed. Patient taking differently: 2 Sprays by Both Nostrils route daily as needed for Allergies. fluticasone-vilanterol (BREO ELLIPTA) 200-25 mcg/dose inhaler 2019 at Unknown time  No Yes Sig: Take 1 Puff by inhalation daily. furosemide (LASIX) 40 mg tablet 2019 at Unknown time  No Yes Sig: TAKE 1 TABLET BY MOUTH EVERY DAY  
lidocaine 4 % patch 2019 at Unknown time  No Yes Sig: Apply to right shoulder  
pantoprazole (PROTONIX) 40 mg tablet 2019 at Unknown time  Yes Yes Sig: Take 40 mg by mouth daily. sertraline (ZOLOFT) 50 mg tablet 8/6/2019 at Unknown time  No Yes Sig: Take 1 Tab by mouth daily. traZODone (DESYREL) 50 mg tablet 8/6/2019 at Unknown time  No Yes Sig: TAKE 1/2-1 TABS BY MOUTH NIGHTLY. warfarin (COUMADIN) 4 mg tablet 8/6/2019 at Unknown time  Yes Yes Sig: Take 4 mg by mouth every evening. Indications: Blood Clot caused by Artificial Heart Valve  
warfarin (COUMADIN) 5 mg tablet 8/6/2019 at Unknown time  Yes Yes Sig: Take 5 mg by mouth nightly. Facility-Administered Medications: None Objective:  
 
Patient Vitals for the past 24 hrs: 
 Temp Pulse Resp BP SpO2  
08/12/19 1520     98 % 08/12/19 1503 98 °F (36.7 °C) 70 18 96/66 98 % 08/12/19 1127 98.3 °F (36.8 °C) 62 17 99/63 99 % 08/12/19 1112     97 % 08/12/19 0807     96 % 08/12/19 0722 98.1 °F (36.7 °C) 69 18 111/58 96 % 08/12/19 0315     98 % 08/12/19 0000     99 % 08/11/19 2232 98.7 °F (37.1 °C) 70 17 107/55 98 % 08/11/19 2015     97 % 08/11/19 1918 98.4 °F (36.9 °C) 67 17 117/62 97 % Oxygen Therapy O2 Sat (%): 98 % (08/12/19 1520) Pulse via Oximetry: 58 beats per minute (08/12/19 1520) O2 Device: Nasal cannula (08/12/19 1520) O2 Flow Rate (L/min): 3 l/min (08/12/19 1520) FIO2 (%): 32 % (08/10/19 0714) Intake/Output Summary (Last 24 hours) at 8/12/2019 1723 Last data filed at 8/12/2019 1615 Gross per 24 hour Intake 840 ml Output 1000 ml Net -160 ml  
   
*Note that automatically entered I/Os may not be accurate; dependent on patient compliance with collection and accurate  by assistants. Physical Exam: 
General:    Obese currently on 3 L O2 NC Eyes:   Normal sclerae. Extraocular movements intact. ENT:  Normocephalic, atraumatic. Moist mucous membranes CV:   RRR. No murmur, rub, or gallop. Lungs:  Bibasilar crackles, b/l, No wheezing, Abdomen: Soft, nontender, nondistended.  Bowel sounds normal.  
 Extremities: Warm and dry. No cyanosis, bilateral lower extremity edema 1+, Neurologic: CN II-XII grossly intact. Sensation intact. Skin:     No rashes or jaundice. Psych:  Normal mood and affect. I reviewed the labs, imaging, EKGs, telemetry, and other studies done this admission. Data Review:  
Recent Results (from the past 24 hour(s)) PROTHROMBIN TIME + INR Collection Time: 08/12/19  7:53 AM  
Result Value Ref Range Prothrombin time 20.9 (H) 11.7 - 14.5 sec INR 1.7    
CBC WITH AUTOMATED DIFF Collection Time: 08/12/19  7:53 AM  
Result Value Ref Range WBC 5.9 4.3 - 11.1 K/uL  
 RBC 2.76 (L) 4.05 - 5.2 M/uL HGB 7.9 (L) 11.7 - 15.4 g/dL HCT 25.9 (L) 35.8 - 46.3 % MCV 93.8 79.6 - 97.8 FL  
 MCH 28.6 26.1 - 32.9 PG  
 MCHC 30.5 (L) 31.4 - 35.0 g/dL  
 RDW 15.9 (H) 11.9 - 14.6 % PLATELET 734 (L) 999 - 450 K/uL MPV 11.4 9.4 - 12.3 FL ABSOLUTE NRBC 0.00 0.0 - 0.2 K/uL  
 DF AUTOMATED NEUTROPHILS 66 43 - 78 % LYMPHOCYTES 19 13 - 44 % MONOCYTES 10 4.0 - 12.0 % EOSINOPHILS 5 0.5 - 7.8 % BASOPHILS 1 0.0 - 2.0 % IMMATURE GRANULOCYTES 0 0.0 - 5.0 %  
 ABS. NEUTROPHILS 3.9 1.7 - 8.2 K/UL  
 ABS. LYMPHOCYTES 1.1 0.5 - 4.6 K/UL  
 ABS. MONOCYTES 0.6 0.1 - 1.3 K/UL  
 ABS. EOSINOPHILS 0.3 0.0 - 0.8 K/UL  
 ABS. BASOPHILS 0.0 0.0 - 0.2 K/UL  
 ABS. IMM. GRANS. 0.0 0.0 - 0.5 K/UL METABOLIC PANEL, COMPREHENSIVE Collection Time: 08/12/19  7:53 AM  
Result Value Ref Range Sodium 141 136 - 145 mmol/L Potassium 3.6 3.5 - 5.1 mmol/L Chloride 96 (L) 98 - 107 mmol/L  
 CO2 40 (H) 21 - 32 mmol/L Anion gap 5 (L) 7 - 16 mmol/L Glucose 84 65 - 100 mg/dL BUN 52 (H) 8 - 23 MG/DL Creatinine 2.18 (H) 0.6 - 1.0 MG/DL  
 GFR est AA 29 (L) >60 ml/min/1.73m2 GFR est non-AA 24 (L) >60 ml/min/1.73m2 Calcium 9.4 8.3 - 10.4 MG/DL  Bilirubin, total 0.9 0.2 - 1.1 MG/DL  
 ALT (SGPT) 436 (H) 12 - 65 U/L  
 AST (SGOT) 120 (H) 15 - 37 U/L  
 Alk. phosphatase 66 50 - 136 U/L Protein, total 6.4 6.3 - 8.2 g/dL Albumin 2.9 (L) 3.2 - 4.6 g/dL Globulin 3.5 2.3 - 3.5 g/dL A-G Ratio 0.8 (L) 1.2 - 3.5 All Micro Results Procedure Component Value Units Date/Time CULTURE, BLOOD [[id number]] Collected:  08/07/19 8343 Order Status:  Completed Specimen:  Blood Updated:  08/12/19 1106 Special Requests: --     
  RIGHT Antecubital 
  
  Culture result: NO GROWTH 5 DAYS     
 CULTURE, BLOOD [[id number]] Collected:  08/07/19 1959 Order Status:  Completed Specimen:  Blood Updated:  08/12/19 1106 Special Requests: --     
  LEFT 
HAND Culture result: NO GROWTH 5 DAYS Current Facility-Administered Medications Medication Dose Route Frequency  furosemide (LASIX) tablet 80 mg  80 mg Oral DAILY  warfarin (COUMADIN) tablet 1 mg  1 mg Oral QPM  
 lidocaine 4 % patch 1 Patch  1 Patch TransDERmal Q24H  
 guaiFENesin ER (MUCINEX) tablet 1,200 mg  1,200 mg Oral Q12H  
 traMADol (ULTRAM) tablet 50 mg  50 mg Oral Q6H PRN  
 carvedilol (COREG) tablet 12.5 mg  12.5 mg Oral BID WITH MEALS  sodium chloride (NS) flush 5-40 mL  5-40 mL IntraVENous Q8H  
 sodium chloride (NS) flush 5-40 mL  5-40 mL IntraVENous PRN  
 LORazepam (ATIVAN) injection 2 mg  2 mg IntraVENous Q4H PRN  
 albuterol (PROVENTIL VENTOLIN) nebulizer solution 2.5 mg  2.5 mg Nebulization Q4H RT  
 aspirin delayed-release tablet 81 mg  81 mg Oral DAILY  docusate sodium (COLACE) capsule 100 mg  100 mg Oral DAILY  pantoprazole (PROTONIX) tablet 40 mg  40 mg Oral DAILY  sertraline (ZOLOFT) tablet 50 mg  50 mg Oral DAILY  traZODone (DESYREL) tablet 50 mg  50 mg Oral QHS PRN  
 ondansetron (ZOFRAN) injection 4 mg  4 mg IntraVENous Q4H PRN Other Studies: Xr Chest Sngl V Result Date: 8/8/2019 EXAM: XR CHEST SNGL V INDICATION: Heart failure COMPARISON: 8/7/2019 FINDINGS: A portable AP radiograph of the chest was obtained at 0423 hours. The patient is on a cardiac monitor. Lobar atelectasis of the left lower lobe. Endotracheal tubes in adequate position. The cardiac and mediastinal contours and pulmonary vascularity are normal.  The bones and soft tissues are grossly within normal limits. IMPRESSION: Lobar atelectasis of the left lower lobe. Xr Chest Sngl V Result Date: 8/7/2019 CHEST X-RAY, single portable view  8/7/2019 History: Intubated. Technique: Single frontal view of the chest. Comparison: Chest x-ray 7/28/2019 Findings: A stable left-sided intracardiac device is seen. An endotracheal tube is now seen. The tip of this is low in position located at the robert directed towards the left mainstem bronchus. Retraction of this by 3 cm should place the tip at the level of a clavicles. Stable moderate to severe cardiomegaly is seen. Lungs are expanded without appreciable pneumothorax. No evolving consolidation, pleural effusion is seen. IMPRESSION: 1. Low position of endotracheal tube tip located at the robert with the tip directed towards the left mainstem bronchus. Recommend retraction of this by 3 cm and a repeat chest x-ray to confirm position. 2. Stable cardiomegaly. Xr Shoulder Rt Ap/lat Min 2 V Result Date: 8/7/2019 THREE-VIEW RIGHT SHOULDER, TWO-VIEW RIGHT HUMERUS, TWO-VIEW RIGHT FOREARM: CLINICAL HISTORY:  Right upper extremity pain after fall. COMPARISON:  None. FINDINGS:  No definite fracture, malalignment, or oly bone destruction is evident. Right shoulder and humerus of June 6, 2019. Impaction injury is again noted at the medial aspect of the junction of the humeral head and neck. New calcification projecting over the central and medial space suggests the possibility of calcific tendinitis of the rotator cuff. No persistent radiopaque foreign body is seen. IMPRESSION:  1. No definite acute bony abnormality identified.  2.  Probable chronic impaction at the medial aspect of the junction of the humeral head and neck 3. Probable calcific tendinitis of the rotator cuff. Xr Humerus Rt Result Date: 8/7/2019 THREE-VIEW RIGHT SHOULDER, TWO-VIEW RIGHT HUMERUS, TWO-VIEW RIGHT FOREARM: CLINICAL HISTORY:  Right upper extremity pain after fall. COMPARISON:  None. FINDINGS:  No definite fracture, malalignment, or oly bone destruction is evident. Right shoulder and humerus of June 6, 2019. Impaction injury is again noted at the medial aspect of the junction of the humeral head and neck. New calcification projecting over the central and medial space suggests the possibility of calcific tendinitis of the rotator cuff. No persistent radiopaque foreign body is seen. IMPRESSION:  1. No definite acute bony abnormality identified. 2.  Probable chronic impaction at the medial aspect of the junction of the humeral head and neck 3. Probable calcific tendinitis of the rotator cuff. Xr Forearm Rt Ap/lat Result Date: 8/7/2019 THREE-VIEW RIGHT SHOULDER, TWO-VIEW RIGHT HUMERUS, TWO-VIEW RIGHT FOREARM: CLINICAL HISTORY:  Right upper extremity pain after fall. COMPARISON:  None. FINDINGS:  No definite fracture, malalignment, or oly bone destruction is evident. Right shoulder and humerus of June 6, 2019. Impaction injury is again noted at the medial aspect of the junction of the humeral head and neck. New calcification projecting over the central and medial space suggests the possibility of calcific tendinitis of the rotator cuff. No persistent radiopaque foreign body is seen. IMPRESSION:  1. No definite acute bony abnormality identified. 2.  Probable chronic impaction at the medial aspect of the junction of the humeral head and neck 3. Probable calcific tendinitis of the rotator cuff. Xr Chest Eudelia Squire Result Date: 8/7/2019 CHEST X-RAY, single portable view  8/7/2019 History: Tube repositioning.  Technique: Single frontal view of the chest. Comparison: Chest x-ray 8/7/2019 Findings: A stable left-sided intracardiac device is seen. The patient is intubated. The endotracheal tube has been repositioned and is now in good position with the line 3 cm above the robert. Stable cardiomegaly is seen. Lungs are expanded without appreciable pneumothorax. No evolving consolidation, or pleural effusion is seen. IMPRESSION: 1. Good position of endotracheal tube. 2. Stable cardiomegaly. Assessment and Plan:  
 
Hospital Problems as of 8/12/2019 Date Reviewed: 8/9/2019 Codes Class Noted - Resolved POA Acute renal failure superimposed on chronic kidney disease ([facility] ICD-10-CM: N17.9, N18.9 ICD-9-CM: 584.9, 585.9  8/8/2019 - Present No  
   
 * (Principal) Acute on chronic respiratory failure with hypoxia [facility]) ICD-10-CM: H77.14 
ICD-9-CM: 518.84, 799.02  8/7/2019 - Present Yes Severe obesity ([facility] ICD-10-CM: E66.01 
ICD-9-CM: 278.01  1/15/2019 - Present Yes Acute exacerbation of CHF (congestive heart failure) (HCC) ICD-10-CM: I50.9 ICD-9-CM: 428.0  11/14/2018 - Present Yes Debility ICD-10-CM: R53.81 ICD-9-CM: 799.3  9/8/2018 - Present Yes Chronic respiratory failure with hypoxia [facility]) ICD-10-CM: J96.11 
ICD-9-CM: 518.83, 799.02  9/7/2018 - Present Yes Type 2 diabetes mellitus with nephropathy (HCC) (Chronic) ICD-10-CM: E11.21 
ICD-9-CM: 250.40, 583.81  12/18/2017 - Present Yes S/P AVR (aortic valve replacement) (Chronic) ICD-10-CM: Z95.2 ICD-9-CM: V43.3  Unknown - Present Yes Overview Signed 2/23/2016 11:04 AM by Jarrett Altamirano Mechanical/Artific Cardiomyopathy ([facility] (Chronic) ICD-10-CM: I42.9 ICD-9-CM: 425.4  Unknown - Present Yes  
   
 ICD (implantable cardioverter-defibrillator) in place ICD-10-CM: Z95.810 ICD-9-CM: V45.02  10/2/2014 - Present Yes Overview Signed 10/2/2014  4:58 PM by Sapna Cali single-chamber ICD implantation 10/20/14 COPD (chronic obstructive pulmonary disease) (HCC) (Chronic) ICD-10-CM: J44.9 ICD-9-CM: 355  4/2/2014 - Present Yes Overview Signed 10/6/2018  8:56 PM by Joyce Vegas NP  
  HOME OXYGEN 3 LITERS 
  
  
   
 REJI (obstructive sleep apnea)-cpap (Chronic) ICD-10-CM: B99.89 
ICD-9-CM: 327.23  4/2/2014 - Present Yes Overview Signed 8/7/2019 12:50 PM by Saritha Bowers NP Home Trilogy CKD (chronic kidney disease) stage 4, GFR 15-29 ml/min (HCC) ICD-10-CM: N18.4 ICD-9-CM: 585.4  7/10/2013 - Present Yes Chronic combined systolic and diastolic heart failure (HCC) (Chronic) ICD-10-CM: I50.42 
ICD-9-CM: 428.42  1/20/2013 - Present Yes Overview Addendum 4/28/2018  4:53 AM by Mir Cooper MD  
  5/8/14 ECHO:  EF 10-15% 12/2017:  EF 25-30% Plan: This is a 67y Female with: 
 
Acute on chronic hypoxemic respiratory failure POA s/p intubation mechanical ventilation extubated 8/8 currently on NC 3-4L. Trilogy at night. Secondary to Acute on chronic systolic and diastolic CHF exacerbation POA. Pulmonary and Cardiology on board. Appreciate recs. Initially Lasix IV bid Currently on PO lasix per Cardiology recs. Acute on chronic systolic and diastolic CHF Exacerbation POA: 
IV lasix switched to PO lasix. Continue fluid restrictions, low sodium diet, daily weights. Coreg, Not on ACE/ARB secondary to FELIZ. Cardiology signed off. BNP of 1822. FELIZ ON CKD stage 4 POA: improving From hypervolemia from CHF exacerbation POA. Cr is 2.1 this am improved from 3.12 on admission. (Baselien Cr of 1.5) Continue PO Lasix. Transaminitis: POA improving LFTs  and  Continue Po Lasix. S/P Aortic valve replacement: 
INR is 1.7 this am. 
Continue Coumadin. Pharmacy to dose Coumadin. Chronic hypoxemic respiratory failure from COPD on  3L o2 at baseline: 
Not in COPD exacerbation. REJI: 
Trilogy at night Type 2 DM with nephropathy: Not on home meds. Accuchecks prn DVT Ppx: Coumadin Code status DNR 
DPOA Pt's daughter Yael Alan [phone] DISPO Planning: Anticipate discharge home with Palliative care when kidney function improves to baseline,  
 
Signed: 
Aisha Duane, MD

## 2019-08-12 NOTE — PROGRESS NOTES
[facility] OUTREACH NURSE PROGRESS REPORT SUBJECTIVE: Called to assess patient secondary to transfer from ICU. MEWS Score: 1 (08/11/19 1204) Vitals:  
 08/11/19 1548 08/11/19 1648 08/11/19 1918 08/11/19 2015 BP:  109/54 117/62 Pulse:  68 67 Resp:  18 17 Temp:  98.1 °F (36.7 °C) 98.4 °F (36.9 °C) SpO2: 95% 98% 97% 97% Weight:      
Height:      
  
 
 
LAB DATA: 
 
Recent Labs 08/11/19 
3774 08/09/19 
1350 08/09/19 
9179  142 143  
K 4.0 3.9 4.6 CL 95* 97* 96* CO2 39* 40* 39* AGAP 7 5* 8 GLU 86 194* 90  
BUN 55* 69* 74* CREA 2.19* 2.71* 2.87* GFRAA 28* 22* 21* GFRNA 24* 18* 17* CA 9.1 9.0 9.6 MG 1.8 1.9  --   
ALB 2.9*  --  2.8*  
TP 6.5  --  6.2*  
GLOB 3.6*  --  3.4 AGRAT 0.8*  --  0.8* *  --  1,007* Recent Labs 08/09/19 
2585 WBC 7.1 HGB 8.1* HCT 26.6*  
 OBJECTIVE: On arrival to room, I found patient to be resting in bed. Pain Assessment Pain Intensity 1: 0 (08/11/19 1425) Pain Location 1: Shoulder Pain Intervention(s) 1: Medication (see MAR) Patient Stated Pain Goal: 0 
 
  
ASSESSMENT:  Patient alert and oriented. Breathing even and unlabored. 100% on 3L NC, HR 63. No visible signs of distress. Complains of R shoulder pain, primary RN aware. PLAN:  Will continue to follow per outreach protocol.

## 2019-08-12 NOTE — PROGRESS NOTES
Critical Care Outreach Nurse Progress Report: 
 
Subjective: In to assess pt secondary to f/u tx from ICU. MEWS Score: 1 (08/12/19 8299) Vitals:  
 08/12/19 0722 08/12/19 0807 08/12/19 1112 08/12/19 1127 BP: 111/58   99/63 Pulse: 69   62 Resp: 18   17 Temp: 98.1 °F (36.7 °C)   98.3 °F (36.8 °C) SpO2: 96% 96% 97% 99% Weight:      
Height:      
  
 
Objective: Pt lying awake in bed, in NAD. Recently got cleaned up from having incontinent stool episode. Pain Intensity 1: 0 (08/12/19 0722) Pain Location 1: Shoulder Pain Intervention(s) 1: Medication (see MAR) Patient Stated Pain Goal: 0 Assessment: A&Ox4. Neuro is intact. Lung sounds clear. O2 Sat 97% on 3L NC, RR unlabored at rest.  BP stable. Denies complaints at this time. Plan: Adjusting heparin gtt based on PTTs. Will follow per outreach protocol.

## 2019-08-13 NOTE — PROGRESS NOTES
Warfarin dosing per pharmacist 
 
Letitia Schreiber Stephanie Landers is a 68 y.o. female. Height: 5' 2\" (157.5 cm)    Weight: 92.2 kg (203 lb 4.2 oz) Indication:  Mechanical AVR Goal INR:  2-3 Home dose:  4 mg hs Risk factors or significant drug interactions:  -- Other anticoagulants:  --- Daily Monitoring Date  INR     Warfarin dose  HGB              Notes 8/7  2.5  ---   8.7 
8/8  2.7  1 mg   8.9 
8/9  2.9  Hold   8.1 8/10  2.4  ---   --- 
8/11  1.7  1 mg + 1 mg  --- 
8/12                 1.7                   1 mg                            7.9  
8/13  1.6  3 mg   8.2 Pharmacy consulted to manage warfarin for Ms. Corral during this admission. Pt was just recently admitted and discharged on 4 mg hs. INR 1.6. Will increase dose to 3 mg tonight. Daily INR. Pharmacy will continue to follow. Please call with any questions. Thank you, Philippe Hicks, PharmD Clinical Pharmacist 
[phone]

## 2019-08-13 NOTE — PROGRESS NOTES
Bedside report received from night nurse Indigo. Assessment done as noted  Respiration even and unlabored 20/min; denies pain or nausea at present. Encouraged to call with needs.

## 2019-08-13 NOTE — PROGRESS NOTES
Hospitalist Progress Note Patient: Sandra Beard MRN: [id number]  SSN: [id number] YOB: 1950  Age: 68 y.o. Sex: female Admit Date: 8/7/2019 LOS: 6 days Subjective:  
 
From previous notes: \"69 y.o.  female with chronic respiratory failure on 3lpm & nocturnal Trilogy who presents with respiratory distress.  Was found by family members this morning with her mask off, \"not looking good\", swollen and EMS was called. Family states she called about 5-6PM last night and reported Trilogy was alarming and was placing herself on O2. Unknown how long mask was off. Daughter said this morning she was conversant but then \"went out\". From last admission she has a DNR in place but family felt she was \"suffering\" today, O2 sat per family was 35% and they allowed nasal intubation.  Per EMS O2 sat was 50%--was not responsive.  She received Versed in route and in the ER then placed on Versed drip as she began to move some.   Was just discharged home Friday after hospital stay for syncopal type episode. [place] been diagnosed with small bowel mass, Heparin bridge for AVR and endoscopy was planned but cancelled per patient desires. Ellis Pang is being admitted to the ICU on vent support for acute hypoxic respiratory failure. \" 
 
8/13 - She feels \"ok\" today. Not as good as yesterday. Denies CP/SOB. Denies F/C/N/V. Review of systems negative except stated above. Objective:  
 
Visit Vitals /57 Pulse 64 Temp 98.2 °F (36.8 °C) Resp 18 Ht 5' 2\" (1.575 m) Wt 92.2 kg (203 lb 4.2 oz) SpO2 96% BMI 37.18 kg/m² Oxygen Therapy O2 Sat (%): 96 % (08/13/19 1111) Pulse via Oximetry: 79 beats per minute (08/13/19 1111) O2 Device: Nasal cannula (08/13/19 1111) O2 Flow Rate (L/min): 3 l/min (08/13/19 1111) FIO2 (%): 32 % (08/10/19 0714) Intake and Output:  
 
Intake/Output Summary (Last 24 hours) at 8/13/2019 1225 Last data filed at 8/13/2019 5800 Gross per 24 hour Intake 840 ml Output 850 ml Net -10 ml Physical Exam:  
GENERAL: alert, cooperative, no distress, appears stated age EYE: conjunctivae/corneas clear. PERRL. THROAT & NECK: normal and no erythema or exudates noted. LUNG: Bibasilar crackles HEART: regular rate and rhythm, S1S2, no murmur, no JVD ABDOMEN: soft, non-tender, non-distended. Bowel sounds normal.  
EXTREMITIES:  1+ BLE edema, 2+ pedal/radial pulses bilaterally SKIN: no rash or abnormalities NEUROLOGIC: A&Ox3. Cranial nerves 2-12 grossly intact. Lab/Data Review: 
Recent Results (from the past 24 hour(s)) PROTHROMBIN TIME + INR Collection Time: 08/13/19  6:58 AM  
Result Value Ref Range Prothrombin time 19.8 (H) 11.7 - 14.5 sec INR 1.6    
CBC WITH AUTOMATED DIFF Collection Time: 08/13/19  6:58 AM  
Result Value Ref Range WBC 6.8 4.3 - 11.1 K/uL  
 RBC 2.86 (L) 4.05 - 5.2 M/uL HGB 8.2 (L) 11.7 - 15.4 g/dL HCT 27.3 (L) 35.8 - 46.3 % MCV 95.5 79.6 - 97.8 FL  
 MCH 28.7 26.1 - 32.9 PG  
 MCHC 30.0 (L) 31.4 - 35.0 g/dL  
 RDW 15.9 (H) 11.9 - 14.6 % PLATELET 006 (L) 567 - 450 K/uL MPV 11.3 9.4 - 12.3 FL ABSOLUTE NRBC 0.00 0.0 - 0.2 K/uL  
 DF AUTOMATED NEUTROPHILS 69 43 - 78 % LYMPHOCYTES 18 13 - 44 % MONOCYTES 8 4.0 - 12.0 % EOSINOPHILS 4 0.5 - 7.8 % BASOPHILS 1 0.0 - 2.0 % IMMATURE GRANULOCYTES 0 0.0 - 5.0 %  
 ABS. NEUTROPHILS 4.7 1.7 - 8.2 K/UL  
 ABS. LYMPHOCYTES 1.2 0.5 - 4.6 K/UL  
 ABS. MONOCYTES 0.6 0.1 - 1.3 K/UL  
 ABS. EOSINOPHILS 0.3 0.0 - 0.8 K/UL  
 ABS. BASOPHILS 0.0 0.0 - 0.2 K/UL  
 ABS. IMM. GRANS. 0.0 0.0 - 0.5 K/UL METABOLIC PANEL, COMPREHENSIVE Collection Time: 08/13/19  6:58 AM  
Result Value Ref Range Sodium 139 136 - 145 mmol/L Potassium 4.0 3.5 - 5.1 mmol/L Chloride 97 (L) 98 - 107 mmol/L  
 CO2 39 (H) 21 - 32 mmol/L Anion gap 3 (L) 7 - 16 mmol/L Glucose 104 (H) 65 - 100 mg/dL  BUN 58 (H) 8 - 23 MG/DL  
 Creatinine 2.29 (H) 0.6 - 1.0 MG/DL  
 GFR est AA 27 (L) >60 ml/min/1.73m2 GFR est non-AA 22 (L) >60 ml/min/1.73m2 Calcium 9.6 8.3 - 10.4 MG/DL Bilirubin, total 0.9 0.2 - 1.1 MG/DL  
 ALT (SGPT) 334 (H) 12 - 65 U/L  
 AST (SGOT) 64 (H) 15 - 37 U/L Alk. phosphatase 66 50 - 136 U/L Protein, total 6.6 6.3 - 8.2 g/dL Albumin 3.0 (L) 3.2 - 4.6 g/dL Globulin 3.6 (H) 2.3 - 3.5 g/dL A-G Ratio 0.8 (L) 1.2 - 3.5 Imaging: Xr Chest Sngl V Result Date: 8/9/2019 EXAM: XR CHEST SNGL V INDICATION: Pulmonary edema COMPARISON: 8/8/2019 FINDINGS: A portable AP radiograph of the chest was obtained at 0421 hours. The patient is on a cardiac monitor. Left lower lobe lobar atelectasis unchanged. . Cardiomegaly unchanged. .  The bones and soft tissues are grossly within normal limits. IMPRESSION: Left lower lobe lobar atelectasis unchanged. Cardiomegaly unchanged. Xr Chest Sngl V Result Date: 8/8/2019 EXAM: XR CHEST SNGL V INDICATION: Heart failure COMPARISON: 8/7/2019 FINDINGS: A portable AP radiograph of the chest was obtained at 0423 hours. The patient is on a cardiac monitor. Lobar atelectasis of the left lower lobe. Endotracheal tubes in adequate position. The cardiac and mediastinal contours and pulmonary vascularity are normal.  The bones and soft tissues are grossly within normal limits. IMPRESSION: Lobar atelectasis of the left lower lobe. Xr Chest Sngl V Result Date: 8/7/2019 CHEST X-RAY, single portable view  8/7/2019 History: Intubated. Technique: Single frontal view of the chest. Comparison: Chest x-ray 7/28/2019 Findings: A stable left-sided intracardiac device is seen. An endotracheal tube is now seen. The tip of this is low in position located at the robert directed towards the left mainstem bronchus. Retraction of this by 3 cm should place the tip at the level of a clavicles. Stable moderate to severe cardiomegaly is seen.  Lungs are expanded without appreciable pneumothorax. No evolving consolidation, pleural effusion is seen. IMPRESSION: 1. Low position of endotracheal tube tip located at the robert with the tip directed towards the left mainstem bronchus. Recommend retraction of this by 3 cm and a repeat chest x-ray to confirm position. 2. Stable cardiomegaly. Xr Shoulder Rt Ap/lat Min 2 V Result Date: 8/7/2019 THREE-VIEW RIGHT SHOULDER, TWO-VIEW RIGHT HUMERUS, TWO-VIEW RIGHT FOREARM: CLINICAL HISTORY:  Right upper extremity pain after fall. COMPARISON:  None. FINDINGS:  No definite fracture, malalignment, or oly bone destruction is evident. Right shoulder and humerus of June 6, 2019. Impaction injury is again noted at the medial aspect of the junction of the humeral head and neck. New calcification projecting over the central and medial space suggests the possibility of calcific tendinitis of the rotator cuff. No persistent radiopaque foreign body is seen. IMPRESSION:  1. No definite acute bony abnormality identified. 2.  Probable chronic impaction at the medial aspect of the junction of the humeral head and neck 3. Probable calcific tendinitis of the rotator cuff. Xr Humerus Rt Result Date: 8/7/2019 THREE-VIEW RIGHT SHOULDER, TWO-VIEW RIGHT HUMERUS, TWO-VIEW RIGHT FOREARM: CLINICAL HISTORY:  Right upper extremity pain after fall. COMPARISON:  None. FINDINGS:  No definite fracture, malalignment, or oly bone destruction is evident. Right shoulder and humerus of June 6, 2019. Impaction injury is again noted at the medial aspect of the junction of the humeral head and neck. New calcification projecting over the central and medial space suggests the possibility of calcific tendinitis of the rotator cuff. No persistent radiopaque foreign body is seen. IMPRESSION:  1. No definite acute bony abnormality identified.  2.  Probable chronic impaction at the medial aspect of the junction of the humeral head and neck 3. Probable calcific tendinitis of the rotator cuff. Xr Forearm Rt Ap/lat Result Date: 8/7/2019 THREE-VIEW RIGHT SHOULDER, TWO-VIEW RIGHT HUMERUS, TWO-VIEW RIGHT FOREARM: CLINICAL HISTORY:  Right upper extremity pain after fall. COMPARISON:  None. FINDINGS:  No definite fracture, malalignment, or oly bone destruction is evident. Right shoulder and humerus of June 6, 2019. Impaction injury is again noted at the medial aspect of the junction of the humeral head and neck. New calcification projecting over the central and medial space suggests the possibility of calcific tendinitis of the rotator cuff. No persistent radiopaque foreign body is seen. IMPRESSION:  1. No definite acute bony abnormality identified. 2.  Probable chronic impaction at the medial aspect of the junction of the humeral head and neck 3. Probable calcific tendinitis of the rotator cuff. Ct Head Wo Cont Result Date: 7/29/2019 Examination: CT scan of the brain without contrast. History: Confusion/delirium, altered LOC, unexplained, 68 years Female Patient presents with daughter. Daughter states patient is supposed to use a walker to ambulate but does not always. Patient was found this afternoon face up, sideways, on the bed. Daughter states they assume syncopal episode, patient has a history of falls. Patient does not recall incident. Daughter brought patient because she is lethargic, patient was able to eat a small meal PTA. Patient denies pain or injuries. Patient on 3L NC at all times for COPD. Daughter states hx of HTN, took medication just PTA with meal Technique: 5 mm axial imaging of the brain from the posterior fossa to the vertex.   Radiation dose reduction techniques were used for this study:  Our CT scanners use one or all of the following: Automated exposure control, adjustment of the mA and/or kVp according to patient's size, iterative reconstruction. Comparison:  CT brain September 07, 2018 Findings: Probable mild microvascular disease unchanged. The ventricles, sulci are age-appropriate. No intracranial hemorrhage or extra-axial collection is identified. No evidence of acute infarct. No mass effect or midline shift is present. Basal cisterns are intact. Small fluid levels are seen in the bilateral maxillary sinuses. Mild mucosal reaction bilateral ethmoid sinuses. The visualized mastoid air cells are clear. The orbits, bones, and soft tissues are normal in appearance. Image quality somewhat degraded by motion artifact. Impression:  No acute intracranial abnormality. Paranasal sinus mucosal disease as above. Xr Chest Sharyon Dark Result Date: 8/7/2019 CHEST X-RAY, single portable view  8/7/2019 History: Tube repositioning. Technique: Single frontal view of the chest. Comparison: Chest x-ray 8/7/2019 Findings: A stable left-sided intracardiac device is seen. The patient is intubated. The endotracheal tube has been repositioned and is now in good position with the line 3 cm above the robert. Stable cardiomegaly is seen. Lungs are expanded without appreciable pneumothorax. No evolving consolidation, or pleural effusion is seen. IMPRESSION: 1. Good position of endotracheal tube. 2. Stable cardiomegaly. Xr Chest Sharyon Dark Result Date: 7/28/2019 AP chest radiograph History: syncope, 68 years Female Comparison: Chest radiograph June 07, 2019 Findings:  Cardiac pacing device obscures part of the left chest wall, single lead appears to remain in anatomic position. Normal cardiomediastinal silhouette with evidence of CABG. Persistent mild hyperinflation suggestive of COPD. Nonspecific mild diffuse interstitial prominence appears slightly increased since prior, may represent edema in the appropriate clinical setting. Trace bilateral pleural effusions appear slightly increased since prior.   Mild subsegmental atelectasis bilateral lung bases. No evidence of pneumothorax. Visualized soft tissue and osseous structures otherwise unremarkable. Impression:  Suspect worsening mild interstitial edema. No results found for this visit on 08/07/19. Cultures: All Micro Results Procedure Component Value Units Date/Time CULTURE, BLOOD [[id number]] Collected:  08/07/19 0736 Order Status:  Completed Specimen:  Blood Updated:  08/12/19 1106 Special Requests: --     
  RIGHT Antecubital 
  
  Culture result: NO GROWTH 5 DAYS     
 CULTURE, BLOOD [[id number]] Collected:  08/07/19 1959 Order Status:  Completed Specimen:  Blood Updated:  08/12/19 1106 Special Requests: --     
  LEFT 
HAND Culture result: NO GROWTH 5 DAYS Assessment/Plan:  
 
Principal Problem: 
  Acute on chronic respiratory failure with hypoxia ([facility] [address] (8/7/2019) - Resolving 
- Continue Albuterol 
- Continue Lasix Active Problems: 
  Acute exacerbation of CHF (congestive heart failure) ([facility] [address] (11/14/2018) - No acute issues - Continue Coreg 
- Continue Lasix - Strict I/O Acute renal failure superimposed on chronic kidney disease ([address] (8/8/2019) - Creatinine worse today 
- Continue current Lasix dose - Repeat BMP in AM 
 
  MDS (myelodysplastic syndrome) ([address] (12/17/2011) Chronic combined systolic and diastolic heart failure ([address] (1/20/2013) - Continue Coreg 
- Continue Lasix CKD (chronic kidney disease) stage 4, GFR 15-29 ml/min ([facility]) (7/10/2013) COPD (chronic obstructive pulmonary disease) ([address] (4/2/2014) CAD (coronary artery disease) (1/20/2013) - No acute CP 
- Continue ASA - Continue Coreg REJI (obstructive sleep apnea)-cpap (4/2/2014) S/P AVR (aortic valve replacement) () 
- Continue Warfarin Cardiomyopathy ([address] () Type 2 diabetes mellitus with nephropathy ([address] (12/18/2017) - No acute issues ICD (implantable cardioverter-defibrillator) in place (10/2/2014) Debility (9/8/2018) Severe obesity ([address] (1/15/2019) Dispo - Continue current meds. Repeat BMP in AM. Hopefully discharge in next 1-2 days. DIET REGULAR 
 
DVT Prophylaxis: Warfarin Signed By: Jim Moody DO August 13, 2019

## 2019-08-13 NOTE — PROGRESS NOTES
[facility] OUTREACH NURSE PROGRESS REPORT SUBJECTIVE: Called to assess patient secondary to transfer from ICU. MEWS Score: 1 (08/12/19 1943) Vitals:  
 08/12/19 1503 08/12/19 1520 08/12/19 1943 08/12/19 2047 BP: 96/66  104/67 Pulse: 70  75 Resp: 18  18 Temp: 98 °F (36.7 °C)  98.5 °F (36.9 °C) SpO2: 98% 98% 98% 97% Weight:      
Height:      
  
 
LAB DATA: 
 
Recent Labs 08/12/19 
8170 08/11/19 
3547  141  
K 3.6 4.0  
CL 96* 95* CO2 40* 39* AGAP 5* 7 GLU 84 86 BUN 52* 55* CREA 2.18* 2.19* GFRAA 29* 28* GFRNA 24* 24* CA 9.4 9.1 MG  --  1.8 ALB 2.9* 2.9*  
TP 6.4 6.5 GLOB 3.5 3.6* AGRAT 0.8* 0.8* * 618* Recent Labs 08/12/19 
4158 WBC 5.9 HGB 7.9*  
HCT 25.9*  
* OBJECTIVE: On arrival to room, I found patient to be resting in bed. Pain Assessment Pain Intensity 1: 0 (08/12/19 2036) Pain Location 1: Shoulder Pain Intervention(s) 1: Medication (see MAR) Patient Stated Pain Goal: 0 
 
 
ASSESSMENT:  Patient alert, no visible signs of distress. Breathing even and unlabored. O2 sat 93% on home trilogy. Patient states the trilogy feels much better now. Denies any needs at this time. PLAN:  Follow up complete per outreach protocol.

## 2019-08-13 NOTE — PROGRESS NOTES
08/12/19 2103 Oxygen Therapy O2 Sat (%) 95 % Pulse via Oximetry 71 beats per minute O2 Device BIPAP  
O2 Flow Rate (L/min) (3L bled in) Respiratory Respiratory (WDL) X Respiratory Pattern Regular Chest/Tracheal Assessment Chest expansion, symmetrical  
Breath Sounds Bilateral Diminished Cough Strong CPAP/BIPAP  
CPAP/BIPAP Start/Stop On Device Mode AVAP 
(home settings) Mask Type and Size Full face Skin Condition Intact PIP Observed 29 cm H20 Vt Spont (ml) 438 ml Ve Observed (l/min) 7.8 l/min Backup Rate 12 Total RR (Spontaneous) 17 breaths per minute Insp Rise Time (sec) 3 Leak (Estimated) 17 L/min  
Pt's Home Machine Yes (type/vendor) (trilogy) Pulmonary Toilet Pulmonary Toilet H. O.B elevated Patient was placed on home trilogy and is resting comfortably. Update: Patient requested to be taken off at 0032 stating that she could not sleep with it on. RT will check back later to see if she is willing to go back on. Update Cont: Patient declined to put home trilogy back on when asked during 0414 tx due to complaints of discomfort while on it earlier. She states the current settings on her home trilogy are \"blowing too much air out\". RT adjusted mask to lower any leak to attempt to help with pt comfort, but this had only some positive affect.

## 2019-08-13 NOTE — PROGRESS NOTES
Problem: Mobility Impaired (Adult and Pediatric) Goal: *Acute Goals and Plan of Care (Insert Text) Description LTG: 
(1.)Ms. Leo Reeves will move from supine to sit and sit to supine, scoot up and down and roll side to side in bed with INDEPENDENT within 7 day(s). (2.)Ms. Leo Reeves will transfer from bed to chair and chair to bed with modified INDEPENDENT using the least restrictive device within 7 day(s). (3.)Ms. Leo Reeves will ambulate with modified INDEPENDENCE for 50+ feet with the least restrictive device within 7 day(s). (4.)Ms. Leo Reeves will perform standing static and dynamic balance activities x 8 minutes with SUPERVISION to improve safety within 7 day(s). (5.)Ms. Leo Reeves will ascend and descend 4 stairs using one hand rail(s) with CGA to improve functional mobility and safety within 7 day(s). Outcome: Progressing Towards Goal 
  
PHYSICAL THERAPY: Daily Note and PM 8/13/2019 INPATIENT: PT Visit Days : 1 Legally blind [place] / [place] / Product Type: Medicare /   
  
NAME/AGE/GENDER: Nicky Rankin is a 68 y.o. female PRIMARY DIAGNOSIS: Acute on chronic respiratory failure with hypoxia (HCC) [J96.21] Acute on chronic respiratory failure with hypoxia (HCC) Acute on chronic respiratory failure with hypoxia (HCC) ICD-10: Treatment Diagnosis:  
 · Other abnormalities of gait and mobility (R26.89) · Repeated Falls (R29.6) Precaution/Allergies: 
Ferrlecit [sodium ferric gluconat-sucrose]; Sulfa (sulfonamide antibiotics); and Aspirin ASSESSMENT:  
 
Ms. Leo Reeves presents sitting EOB and willing to try and walk. She stood several times from EOB to take out purewic, yvonne brief, and put on robe. She did not want to use the walker. She walked about 50 feet, sat and rested in a chair in the hallway then returned to a chair in her room. She did fairly well needing mostly CGA for all mobility.   Will continue to work toward goals. This section established at most recent assessment PROBLEM LIST (Impairments causing functional limitations): 1. Decreased Transfer Abilities 2. Decreased Ambulation Ability/Technique 3. Decreased Balance 4. Increased Pain 5. Decreased Activity Tolerance INTERVENTIONS PLANNED: (Benefits and precautions of physical therapy have been discussed with the patient.) 1. Balance Exercise 2. Bed Mobility 3. Gait Training 4. Therapeutic Activites 5. Therapeutic Exercise/Strengthening 6. Transfer Training 7. education TREATMENT PLAN: Frequency/Duration: 3 times a week for duration of hospital stay Rehabilitation Potential For Stated Goals: Good REHAB RECOMMENDATIONS (at time of discharge pending progress):   
Placement: It is my opinion, based on this patient's performance to date, that Ms. Luda Llanes may benefit from participating in 1-2 additional therapy sessions in order to continue to assess for rehab potential and then make recommendation for disposition at discharge. HHPT?? Equipment:  
? None at this time HISTORY:  
History of Present Injury/Illness (Reason for Referral): 
Per MD note, \"Patient is a 68 y.o.  female with chronic respiratory failure on 3lpm & nocturnal Trilogy who presents with respiratory distress. Was found by family members this morning with her mask off, \"not looking good\", swollen and EMS was called. Family states she called about 5-6PM last night and reported Trilogy was alarming and was placing herself on O2. Unknown how long mask was off. Daughter said this morning she was conversant but then \"went out\". From last admission she has a DNR in place but family felt she was \"suffering\" today, O2 sat per family was 35% and they allowed nasal intubation. Per EMS O2 sat was 50%--was not responsive. She received Versed in route and in the ER then placed on Versed drip as she began to move some.    Was just discharged home Friday after hospital stay for syncopal type episode. Had been diagnosed with small bowel mass, Heparin bridge for AVR and endoscopy was planned but cancelled per patient desires. She is being admitted to the ICU on vent support for acute hypoxic respiratory failure. Was doing fairly well over the weekend. She is debilitated, slightly mobile with walker and overall debilitated. She fell Monday and EMS was called to the home twice but did not feel she needed to be brought to hospital.  Family reports her meds have been provided but not sure she is taking them. Was to see Dr. Cheryl West Monday but she called and cancelled appointment. Chronic Medical:  Systolic/diastolic heart failure, CAD with previous stent, cardiomyopathy with EF 20-25%, AVR with mechanical valve in 2005 and on Coumadin, ICD, HTN, HLD, COPD, pulmonary hypertension, OA, DM, MDS, former tobacco abuse-quit 2014. \" 
Past Medical History/Comorbidities: Ms. Giovanni Douglas  has a past medical history of Anticoagulated on Coumadin (7/9/2013), CAD (coronary artery disease) (1/20/2013), Cardiomyopathy ([address] CHF (congestive heart failure) ([address] (2/17/2017), CKD (chronic kidney disease) stage 3, GFR 30-59 ml/min ([facility]) (7/10/2013), COPD (chronic obstructive pulmonary disease) ([address] (4/2/2014), Diabetes ([address] Essential hypertension, benign (1/20/2013), HLD (hyperlipidemia), ICD (implantable cardioverter-defibrillator) in place (10/2/2014), Iron deficiency anemia due to chronic blood loss (7/29/2009), MDS (myelodysplastic syndrome) ([address] (12/17/2011), REJI (obstructive sleep apnea)-cpap (4/2/2014), Osteoarthritis, Osteopenia, Pulmonary hypertension ([address] (6/15/2016), Quadrantanopia, Skin defect (11/1/2018), and Visual complaint (12/14/2015).  She also has no past medical history of Difficult intubation, Malignant hyperthermia due to anesthesia, Nausea & vomiting, Pseudocholinesterase deficiency, or Unspecified adverse effect of anesthesia. Ms. Diaz  has a past surgical history that includes cardiac catheterization (); ir bx bone marrow diagnostic (2011); hx aortic valve replacement (, ); hx  section; hx tubal ligation; hx heart catheterization (); hx coronary stent placement (May, 2014); hx pacemaker (10/2/2014); hx endoscopy (2009); and colonoscopy (N/A, 2019). Social History/Living Environment:  
Home Environment: Apartment # Steps to Enter: 4 Rails to Enter: Yes One/Two Story Residence: One story Living Alone: Yes Support Systems: Child(alysha) Patient Expects to be Discharged to[de-identified] [facility] Current DME Used/Available at Home: Shower chair, Walker, rollator Tub or Shower Type: Tub/Shower combination Prior Level of Function/Work/Activity: 
Lives alone, ambulates short distances with rollator independently. [facility] assists with ADL's. Number of Personal Factors/Comorbidities that affect the Plan of Care: 3+: HIGH COMPLEXITY EXAMINATION:  
Most Recent Physical Functioning:  
Gross Assessment: 
  
         
  
Posture: 
  
Balance: 
  Bed Mobility: 
  
Wheelchair Mobility: 
  
Transfers: 
Sit to Stand: Contact guard assistance Stand to Sit: Contact guard assistance Gait: 
  
Base of Support: Widened Speed/Winifred: Shuffled; Slow Distance (ft): 60 Feet (ft)(1 seated rest) Assistive Device: (none) Ambulation - Level of Assistance: Contact guard assistance Body Structures Involved: 1. Lungs Body Functions Affected: 1. Sensory/Pain 2. Respiratory 3. Movement Related Activities and Participation Affected: 1. Mobility 2. Self Care 3. Domestic Life Number of elements that affect the Plan of Care: 3: MODERATE COMPLEXITY CLINICAL PRESENTATION:  
Presentation: Stable and uncomplicated: LOW COMPLEXITY CLINICAL DECISION MAKIN \[facility]\"" [address] [id number] AM-PAC 6 Clicks Basic Mobility Inpatient Short Form How much difficulty does the patient currently have. ..  Unable A Lot A Little None 1. Turning over in bed (including adjusting bedclothes, sheets and blankets)? ? 1   ? 2   ? 3   ? 4  
2. Sitting down on and standing up from a chair with arms ( e.g., wheelchair, bedside commode, etc.)   ? 1   ? 2   ? 3   ? 4  
3. Moving from lying on back to sitting on the side of the bed?   ? 1   ? 2   ? 3   ? 4 How much help from another person does the patient currently need. .. Total A Lot A Little None 4. Moving to and from a bed to a chair (including a wheelchair)? ? 1   ? 2   ? 3   ? 4  
5. Need to walk in hospital room? ? 1   ? 2   ? 3   ? 4  
6. Climbing 3-5 steps with a railing? ? 1   ? 2   ? 3   ? 4  
© 2007, Trustees of [address] [place] [address] [address] [id number], under license to [employer]. All rights reserved Score:  Initial: 18 Most Recent: X (Date: -- ) Interpretation of Tool:  Represents activities that are increasingly more difficult (i.e. Bed mobility, Transfers, Gait). Medical Necessity:    
· Patient is expected to demonstrate progress in functional technique and activity tolerance ·  to increase independence with   and improve safety during all functional mobility. · . Reason for Services/Other Comments: 
· Patient continues to require skilled intervention due to medical complications and decline in functional mobility. · . Use of outcome tool(s) and clinical judgement create a POC that gives a: Clear prediction of patient's progress: LOW COMPLEXITY  
  
 
 
 
TREATMENT:  
(In addition to Assessment/Re-Assessment sessions the following treatments were rendered) Pre-treatment Symptoms/Complaints:  none. Pain: Initial:  
Pain Intensity 1: 0  Post Session:  9 Therapeutic Activity: (    25 minutes): Therapeutic activities including Bed transfers, Chair transfers and Ambulation on level ground to improve mobility, strength, balance and endurance. Required minimal   to promote static and dynamic balance in standing.   
 
 
Braces/Orthotics/Lines/Etc:  
 · Maria Teresa Flaherty · O2 Device: Nasal cannula Treatment/Session Assessment:   
· Response to Treatment:  pleasant and cooperative. · Interdisciplinary Collaboration:  
o Physical Therapy Assistant 
o Registered Nurse · After treatment position/precautions:  
o Up in chair 
o Bed/Chair-wheels locked 
o Bed in low position 
o Call light within reach 
o RN notified · Compliance with Program/Exercises: Compliant most of the time · Recommendations/Intent for next treatment session: \"Next visit will focus on advancements to more challenging activities and reduction in assistance provided\". Total Treatment Duration: PT Patient Time In/Time Out Time In: 1410 Time Out: 1435 Fariba Bautista, PTA

## 2019-08-13 NOTE — ROUTINE PROCESS
CHF teaching completed post introduction to pt/family; aware of diagnosis. Planner/scale @ BS and will follow. Smoking/ ETOH/Illicit drug use cessation covered. Pt/family aware that I cannot prescribe nor adjust medications: 15mins Palliative Care score: 
Start 2 Liter Fluid Restriction/ Cardiac diet CHF teaching continues to pt/family. Emphasis on taking prescription meds as ordered, to keep F/U appointments and  maintain healthy weight , to call MD STAT if any of the following occur: ? If you gain 2 lbs in one day or 5 lbs in a week, and short of breath. ? If you cannot breathe, are short of breath or rapid breathing at rest. Develop a cough or wheezing. ? If you notice swollen hands/feet/ankles or stomach with a bloated/ full feeling. ? If you become confused or mentally fuzzy or dizzy. ? If you notice a rapid or change in your heart rate. ? If you become more exhausted all the time and unable to do the same level of activity without stopping to catch your breath. Drink no more than 8 cups a day in 8 oz. cups. Your Heart can not handle any more. Stay away from salt (limit anything with salt or sodium in it). Limit to 250mg per serving.  
Pt/family verbalizes understanding, will follow to reinforce teaching skills: 60 mins  Total 
 
HH/PT

## 2019-08-14 NOTE — PROGRESS NOTES
Pt resting in bed with eyes closed at this time. Pt alert and oriented to person place and time. No distress noted at this time. Respirations even and unlabored on 3 L NC. Pt denies pain at this time. IV clean dry and intact. No signs of infection noted at this time. Pt on strict I/O's. [place] Leap in place and draining appropriately. Pt instructed to call for assistance if needed. Call light in place. Door open. Will continue to monitor.

## 2019-08-14 NOTE — PROGRESS NOTES
Hospitalist Progress Note Patient: Silvio Melgoza MRN: [id number]  SSN: [id number] YOB: 1950  Age: 68 y.o. Sex: female Admit Date: 8/7/2019 LOS: 7 days Subjective:  
 
From previous notes: \"69 y.o.  female with chronic respiratory failure on 3lpm & nocturnal Trilogy who presents with respiratory distress.  Was found by family members this morning with her mask off, \"not looking good\", swollen and EMS was called. Family states she called about 5-6PM last night and reported Trilogy was alarming and was placing herself on O2. Unknown how long mask was off. Daughter said this morning she was conversant but then \"went out\". From last admission she has a DNR in place but family felt she was \"suffering\" today, O2 sat per family was 35% and they allowed nasal intubation.  Per EMS O2 sat was 50%--was not responsive.  She received Versed in route and in the ER then placed on Versed drip as she began to move some.   Was just discharged home Friday after hospital stay for syncopal type episode. Lory Hart been diagnosed with small bowel mass, Heparin bridge for AVR and endoscopy was planned but cancelled per patient desires. Janee Nuñez is being admitted to the ICU on vent support for acute hypoxic respiratory failure. \" 
 
8/14 - She feels better today. Denies CP/SOB. Denies F/C/N/V. Review of systems negative except stated above. Objective:  
 
Visit Vitals /61 Pulse 62 Temp 98.2 °F (36.8 °C) Resp 20 Ht 5' 2\" (1.575 m) Wt 92.6 kg (204 lb 3.2 oz) SpO2 96% BMI 37.35 kg/m² Oxygen Therapy O2 Sat (%): 96 % (08/14/19 0745) Pulse via Oximetry: 84 beats per minute (08/14/19 0745) O2 Device: Nasal cannula (08/14/19 0745) O2 Flow Rate (L/min): 3 l/min (08/14/19 0745) FIO2 (%): 40 % (08/13/19 2132) Intake and Output:  
 
Intake/Output Summary (Last 24 hours) at 8/14/2019 1113 Last data filed at 8/14/2019 1304 Gross per 24 hour Intake 240 ml  
 Output 800 ml Net -560 ml Physical Exam:  
GENERAL: alert, cooperative, no distress, appears stated age EYE: conjunctivae/corneas clear. PERRL. THROAT & NECK: normal and no erythema or exudates noted. LUNG: Bibasilar crackles HEART: regular rate and rhythm, S1S2, no murmur, no JVD ABDOMEN: soft, non-tender, non-distended. Bowel sounds normal.  
EXTREMITIES:  1+ BLE edema, 2+ pedal/radial pulses bilaterally SKIN: no rash or abnormalities NEUROLOGIC: A&Ox3. Cranial nerves 2-12 grossly intact. Lab/Data Review: 
Recent Results (from the past 24 hour(s)) PROTHROMBIN TIME + INR Collection Time: 08/14/19  7:46 AM  
Result Value Ref Range Prothrombin time 19.6 (H) 11.7 - 14.5 sec INR 1.6 RENAL FUNCTION PANEL Collection Time: 08/14/19  7:46 AM  
Result Value Ref Range Sodium 140 136 - 145 mmol/L Potassium 4.3 3.5 - 5.1 mmol/L Chloride 97 (L) 98 - 107 mmol/L  
 CO2 39 (H) 21 - 32 mmol/L Anion gap 4 (L) 7 - 16 mmol/L Glucose 98 65 - 100 mg/dL BUN 56 (H) 8 - 23 MG/DL Creatinine 2.16 (H) 0.6 - 1.0 MG/DL  
 GFR est AA 29 (L) >60 ml/min/1.73m2 GFR est non-AA 24 (L) >60 ml/min/1.73m2 Calcium 9.6 8.3 - 10.4 MG/DL Phosphorus 4.8 (H) 2.3 - 3.7 MG/DL Albumin 3.1 (L) 3.2 - 4.6 g/dL Imaging: Xr Chest Sngl V Result Date: 8/9/2019 EXAM: XR CHEST SNGL V INDICATION: Pulmonary edema COMPARISON: 8/8/2019 FINDINGS: A portable AP radiograph of the chest was obtained at 0421 hours. The patient is on a cardiac monitor. Left lower lobe lobar atelectasis unchanged. . Cardiomegaly unchanged. .  The bones and soft tissues are grossly within normal limits. IMPRESSION: Left lower lobe lobar atelectasis unchanged. Cardiomegaly unchanged. Xr Chest Sngl V Result Date: 8/8/2019 EXAM: XR CHEST SNGL V INDICATION: Heart failure COMPARISON: 8/7/2019 FINDINGS: A portable AP radiograph of the chest was obtained at 0423 hours. The patient is on a cardiac monitor. Lobar atelectasis of the left lower lobe. Endotracheal tubes in adequate position. The cardiac and mediastinal contours and pulmonary vascularity are normal.  The bones and soft tissues are grossly within normal limits. IMPRESSION: Lobar atelectasis of the left lower lobe. Xr Chest Sngl V Result Date: 8/7/2019 CHEST X-RAY, single portable view  8/7/2019 History: Intubated. Technique: Single frontal view of the chest. Comparison: Chest x-ray 7/28/2019 Findings: A stable left-sided intracardiac device is seen. An endotracheal tube is now seen. The tip of this is low in position located at the robert directed towards the left mainstem bronchus. Retraction of this by 3 cm should place the tip at the level of a clavicles. Stable moderate to severe cardiomegaly is seen. Lungs are expanded without appreciable pneumothorax. No evolving consolidation, pleural effusion is seen. IMPRESSION: 1. Low position of endotracheal tube tip located at the robert with the tip directed towards the left mainstem bronchus. Recommend retraction of this by 3 cm and a repeat chest x-ray to confirm position. 2. Stable cardiomegaly. Xr Shoulder Rt Ap/lat Min 2 V Result Date: 8/7/2019 THREE-VIEW RIGHT SHOULDER, TWO-VIEW RIGHT HUMERUS, TWO-VIEW RIGHT FOREARM: CLINICAL HISTORY:  Right upper extremity pain after fall. COMPARISON:  None. FINDINGS:  No definite fracture, malalignment, or oly bone destruction is evident. Right shoulder and humerus of June 6, 2019. Impaction injury is again noted at the medial aspect of the junction of the humeral head and neck. New calcification projecting over the central and medial space suggests the possibility of calcific tendinitis of the rotator cuff. No persistent radiopaque foreign body is seen. IMPRESSION:  1. No definite acute bony abnormality identified.  2.  Probable chronic impaction at the medial aspect of the junction of the humeral head and neck 3. Probable calcific tendinitis of the rotator cuff. Xr Humerus Rt Result Date: 8/7/2019 THREE-VIEW RIGHT SHOULDER, TWO-VIEW RIGHT HUMERUS, TWO-VIEW RIGHT FOREARM: CLINICAL HISTORY:  Right upper extremity pain after fall. COMPARISON:  None. FINDINGS:  No definite fracture, malalignment, or oly bone destruction is evident. Right shoulder and humerus of June 6, 2019. Impaction injury is again noted at the medial aspect of the junction of the humeral head and neck. New calcification projecting over the central and medial space suggests the possibility of calcific tendinitis of the rotator cuff. No persistent radiopaque foreign body is seen. IMPRESSION:  1. No definite acute bony abnormality identified. 2.  Probable chronic impaction at the medial aspect of the junction of the humeral head and neck 3. Probable calcific tendinitis of the rotator cuff. Xr Forearm Rt Ap/lat Result Date: 8/7/2019 THREE-VIEW RIGHT SHOULDER, TWO-VIEW RIGHT HUMERUS, TWO-VIEW RIGHT FOREARM: CLINICAL HISTORY:  Right upper extremity pain after fall. COMPARISON:  None. FINDINGS:  No definite fracture, malalignment, or oly bone destruction is evident. Right shoulder and humerus of June 6, 2019. Impaction injury is again noted at the medial aspect of the junction of the humeral head and neck. New calcification projecting over the central and medial space suggests the possibility of calcific tendinitis of the rotator cuff. No persistent radiopaque foreign body is seen. IMPRESSION:  1. No definite acute bony abnormality identified. 2.  Probable chronic impaction at the medial aspect of the junction of the humeral head and neck 3. Probable calcific tendinitis of the rotator cuff. Ct Head Wo Cont Result Date: 7/29/2019 Examination: CT scan of the brain without contrast. History: Confusion/delirium, altered LOC, unexplained, 68 years Female Patient presents with daughter. Daughter states patient is supposed to use a walker to ambulate but does not always. Patient was found this afternoon face up, sideways, on the bed. Daughter states they assume syncopal episode, patient has a history of falls. Patient does not recall incident. Daughter brought patient because she is lethargic, patient was able to eat a small meal PTA. Patient denies pain or injuries. Patient on 3L NC at all times for COPD. Daughter states hx of HTN, took medication just PTA with meal Technique: 5 mm axial imaging of the brain from the posterior fossa to the vertex. Radiation dose reduction techniques were used for this study:  Our CT scanners use one or all of the following: Automated exposure control, adjustment of the mA and/or kVp according to patient's size, iterative reconstruction. Comparison:  CT brain September 07, 2018 Findings: Probable mild microvascular disease unchanged. The ventricles, sulci are age-appropriate. No intracranial hemorrhage or extra-axial collection is identified. No evidence of acute infarct. No mass effect or midline shift is present. Basal cisterns are intact. Small fluid levels are seen in the bilateral maxillary sinuses. Mild mucosal reaction bilateral ethmoid sinuses. The visualized mastoid air cells are clear. The orbits, bones, and soft tissues are normal in appearance. Image quality somewhat degraded by motion artifact. Impression:  No acute intracranial abnormality. Paranasal sinus mucosal disease as above. Xr Chest [facility] Result Date: 8/7/2019 CHEST X-RAY, single portable view  8/7/2019 History: Tube repositioning. Technique: Single frontal view of the chest. Comparison: Chest x-ray 8/7/2019 Findings: A stable left-sided intracardiac device is seen. The patient is intubated. The endotracheal tube has been repositioned and is now in good position with the line 3 cm above the robert.  Stable cardiomegaly is seen. Lungs are expanded without appreciable pneumothorax. No evolving consolidation, or pleural effusion is seen. IMPRESSION: 1. Good position of endotracheal tube. 2. Stable cardiomegaly. Xr Chest [facility] Result Date: 7/28/2019 AP chest radiograph History: syncope, 68 years Female Comparison: Chest radiograph June 07, 2019 Findings:  Cardiac pacing device obscures part of the left chest wall, single lead appears to remain in anatomic position. Normal cardiomediastinal silhouette with evidence of CABG. Persistent mild hyperinflation suggestive of COPD. Nonspecific mild diffuse interstitial prominence appears slightly increased since prior, may represent edema in the appropriate clinical setting. Trace bilateral pleural effusions appear slightly increased since prior. Mild subsegmental atelectasis bilateral lung bases. No evidence of pneumothorax. Visualized soft tissue and osseous structures otherwise unremarkable. Impression:  Suspect worsening mild interstitial edema. No results found for this visit on 08/07/19. Cultures: All Micro Results Procedure Component Value Units Date/Time CULTURE, BLOOD [[id number]] Collected:  08/07/19 0263 Order Status:  Completed Specimen:  Blood Updated:  08/12/19 1106 Special Requests: --     
  RIGHT Antecubital 
  
  Culture result: NO GROWTH 5 DAYS     
 CULTURE, BLOOD [[id number]] Collected:  08/07/19 1959 Order Status:  Completed Specimen:  Blood Updated:  08/12/19 1106 Special Requests: --     
  LEFT 
HAND Culture result: NO GROWTH 5 DAYS Assessment/Plan:  
 
Principal Problem: 
  Acute on chronic respiratory failure with hypoxia ([facility] [address] (8/7/2019) - Resolving 
- Continue Albuterol 
- Continue Lasix Active Problems: 
  Acute exacerbation of CHF (congestive heart failure) ([address] (11/14/2018) - No acute issues - Continue Coreg 
- Continue Lasix - Strict I/O 
 
 Acute renal failure superimposed on chronic kidney disease ([address] (8/8/2019) - Creatinine worse today 
- Continue current Lasix dose - Repeat BMP in AM 
 
  MDS (myelodysplastic syndrome) ([address] (12/17/2011) Chronic combined systolic and diastolic heart failure ([address] (1/20/2013) - Continue Coreg 
- Continue Lasix CKD (chronic kidney disease) stage 4, GFR 15-29 ml/min ([facility]) (7/10/2013) COPD (chronic obstructive pulmonary disease) ([address] (4/2/2014) CAD (coronary artery disease) (1/20/2013) - No acute CP 
- Continue ASA - Continue Coreg REJI (obstructive sleep apnea)-cpap (4/2/2014) S/P AVR (aortic valve replacement) () 
- Continue Warfarin 
- Start Heparin gtt 
- Goal INR 2.5 prior to discharge Cardiomyopathy ([address] () Type 2 diabetes mellitus with nephropathy ([facility] [address] (12/18/2017) - No acute issues ICD (implantable cardioverter-defibrillator) in place (10/2/2014) Debility (9/8/2018) Severe obesity ([address] (1/15/2019) Dispo - Continue current meds. Start Heparin gtt. Discharge when INR >2.5. DIET REGULAR 
 
DVT Prophylaxis: Warfarin Signed By: Vanessa Rice,  August 14, 2019

## 2019-08-14 NOTE — PROGRESS NOTES
PT wants to take off Trilolgy. Placed back on 3L via NC. States, \" I wore that thing 3 hours\". Reoriented to time. Pt doesn't wish to wear the trilogy at this moment. Encouraged to call if ready to wear home trilogy. Pt voiced understanding.

## 2019-08-14 NOTE — PROGRESS NOTES
Pt resting in bed at this time. Triology on at this time. Pt alert and oriented times 3. No distress noted at this time. Respirations even and unlabored. Pt denies pain. Pt instructed to call for assistance if needed. Call light in place. Door open. Will continue to monitor.

## 2019-08-14 NOTE — PROGRESS NOTES
Bedside report received from Jim Glass RN. Pt resting in bed with 3L via NC. No distress. Resp even and unlabored at rest with eyes closed. Door open.

## 2019-08-14 NOTE — ROUTINE PROCESS
Late entry: CHF teaching completed, verbalize emphasis on monitoring self and report to MD: 
? If you gain 2 lbs in one day or 5 lbs in a week, and short of breath. ? If you cannot lay flat without developing short of breath or rapid breathing at night; or if it wakes you up. Develop a cough or wheezing. ? If you notice swollen hands/feet/ankles or stomach with a bloated/ full feeling. ? If you become confused or mentally fuzzy or dizzy. ? If you notice a rapid or change in your heart rate. ? If you become more exhausted all the time and unable to do the same level of activity without stopping to catch your breath. Drink no more than 8 cups a day in 8 oz. cups. Limit Cola Drinks. Your Heart can not handle any more. Stay away from salt (limit anything with salt or sodium in it). Limit to 250mg per serving. Exercise needs to be started with your Doctors approval. 
Reduce stress, call your Doctor or myself if concerned Pass posttest via teach back; will make self-available post DC, if any questions arise. Diabetic teaching completed. Planner/scale @ BS:  60 mins total 
 
Reinforced need for earlier intervention; dyspneic for hrs prior to [facility] aware

## 2019-08-14 NOTE — PROGRESS NOTES
PT wants to take off home Trilolgy and didn't want to wear it tonight  Placed back on 3L via NC.

## 2019-08-14 NOTE — PROGRESS NOTES
Ptt was called greater than 200. Spoke with Dr. Leena Perla. Made aware. Ptt was drawn after heparin drip was started. MD states to hold heparin drip for 2 hours and then recheck ptt. Will continue to monitor.

## 2019-08-14 NOTE — PROGRESS NOTES
Problem: Mobility Impaired (Adult and Pediatric) Goal: *Acute Goals and Plan of Care (Insert Text) Description LTG: 
(1.)Ms. Kati Simmons will move from supine to sit and sit to supine, scoot up and down and roll side to side in bed with INDEPENDENT within 7 day(s). (2.)Ms. Kati Simmons will transfer from bed to chair and chair to bed with modified INDEPENDENT using the least restrictive device within 7 day(s). (3.)Ms. Kati Simmons will ambulate with modified INDEPENDENCE for 50+ feet with the least restrictive device within 7 day(s). (4.)Ms. Kati Simmons will perform standing static and dynamic balance activities x 8 minutes with SUPERVISION to improve safety within 7 day(s). (5.)Ms. Kati Simmons will ascend and descend 4 stairs using one hand rail(s) with CGA to improve functional mobility and safety within 7 day(s). Outcome: Progressing Towards Goal 
  
PHYSICAL THERAPY: Daily Note and PM 8/14/2019 INPATIENT: PT Visit Days : 2 Legally blind [place] / [place] / Product Type: Medicare /   
  
NAME/AGE/GENDER: Aquilino Santillan is a 68 y.o. female PRIMARY DIAGNOSIS: Acute on chronic respiratory failure with hypoxia (HCC) [J96.21] Acute on chronic respiratory failure with hypoxia (HCC) Acute on chronic respiratory failure with hypoxia (HCC) ICD-10: Treatment Diagnosis:  
 · Other abnormalities of gait and mobility (R26.89) · Repeated Falls (R29.6) Precaution/Allergies: 
Ferrlecit [sodium ferric gluconat-sucrose]; Sulfa (sulfonamide antibiotics); and Aspirin ASSESSMENT:  
 
Ms. Kati Simmons presents supine and willing to walk. She needed a little assist to sit up from supine and stood with CGA. She walked further than yesterday but with 2 seated rests. She has fear of falling so likes to hold the railing if available with her L arm and does not like to use the walker. She is debilitated and fatigues quickly but has been participating well. This section established at most recent assessment PROBLEM LIST (Impairments causing functional limitations): 1. Decreased Transfer Abilities 2. Decreased Ambulation Ability/Technique 3. Decreased Balance 4. Increased Pain 5. Decreased Activity Tolerance INTERVENTIONS PLANNED: (Benefits and precautions of physical therapy have been discussed with the patient.) 1. Balance Exercise 2. Bed Mobility 3. Gait Training 4. Therapeutic Activites 5. Therapeutic Exercise/Strengthening 6. Transfer Training 7. education TREATMENT PLAN: Frequency/Duration: 3 times a week for duration of hospital stay Rehabilitation Potential For Stated Goals: Good REHAB RECOMMENDATIONS (at time of discharge pending progress):   
Placement: It is my opinion, based on this patient's performance to date, that Ms. Steven Mcgrath may benefit from participating in 1-2 additional therapy sessions in order to continue to assess for rehab potential and then make recommendation for disposition at discharge. HHPT?? Equipment:  
? None at this time HISTORY:  
History of Present Injury/Illness (Reason for Referral): 
Per MD note, \"Patient is a 68 y.o.  female with chronic respiratory failure on 3lpm & nocturnal Trilogy who presents with respiratory distress. Was found by family members this morning with her mask off, \"not looking good\", swollen and EMS was called. Family states she called about 5-6PM last night and reported Trilogy was alarming and was placing herself on O2. Unknown how long mask was off. Daughter said this morning she was conversant but then \"went out\". From last admission she has a DNR in place but family felt she was \"suffering\" today, O2 sat per family was 35% and they allowed nasal intubation. Per EMS O2 sat was 50%--was not responsive. She received Versed in route and in the ER then placed on Versed drip as she began to move some.    Was just discharged home Friday after hospital stay for syncopal type episode. Had been diagnosed with small bowel mass, Heparin bridge for AVR and endoscopy was planned but cancelled per patient desires. She is being admitted to the ICU on vent support for acute hypoxic respiratory failure. Was doing fairly well over the weekend. She is debilitated, slightly mobile with walker and overall debilitated. She fell Monday and EMS was called to the home twice but did not feel she needed to be brought to hospital.  Family reports her meds have been provided but not sure she is taking them. Was to see Dr. Yaya Marquez Monday but she called and cancelled appointment. Chronic Medical:  Systolic/diastolic heart failure, CAD with previous stent, cardiomyopathy with EF 20-25%, AVR with mechanical valve in 2005 and on Coumadin, ICD, HTN, HLD, COPD, pulmonary hypertension, OA, DM, MDS, former tobacco abuse-quit 2014. \" 
Past Medical History/Comorbidities: Ms. Kay  has a past medical history of Anticoagulated on Coumadin (7/9/2013), CAD (coronary artery disease) (1/20/2013), Cardiomyopathy ([address] CHF (congestive heart failure) ([address] (2/17/2017), CKD (chronic kidney disease) stage 3, GFR 30-59 ml/min ([facility]) (7/10/2013), COPD (chronic obstructive pulmonary disease) ([address] (4/2/2014), Diabetes ([address] Essential hypertension, benign (1/20/2013), HLD (hyperlipidemia), ICD (implantable cardioverter-defibrillator) in place (10/2/2014), Iron deficiency anemia due to chronic blood loss (7/29/2009), MDS (myelodysplastic syndrome) ([address] (12/17/2011), REJI (obstructive sleep apnea)-cpap (4/2/2014), Osteoarthritis, Osteopenia, Pulmonary hypertension ([address] (6/15/2016), Quadrantanopia, Skin defect (11/1/2018), and Visual complaint (12/14/2015).  She also has no past medical history of Difficult intubation, Malignant hyperthermia due to anesthesia, Nausea & vomiting, Pseudocholinesterase deficiency, or Unspecified adverse effect of anesthesia. Ms. Aaron Hernandez  has a past surgical history that includes cardiac catheterization (); ir bx bone marrow diagnostic (2011); hx aortic valve replacement (, ); hx  section; hx tubal ligation; hx heart catheterization (); hx coronary stent placement (May, 2014); hx pacemaker (10/2/2014); hx endoscopy (2009); and colonoscopy (N/A, 2019). Social History/Living Environment:  
Home Environment: Apartment # Steps to Enter: 4 Rails to Enter: Yes One/Two Story Residence: One story Living Alone: Yes Support Systems: Child(alysha) [facility] Current DME Used/Available at Home: Shower chair, Walker, rollator Tub or Shower Type: Tub/Shower combination Prior Level of Function/Work/Activity: 
Lives alone, ambulates short distances with rollator independently. CLTC assists with ADL's. Number of Personal Factors/Comorbidities that affect the Plan of Care: 3+: HIGH COMPLEXITY EXAMINATION:  
Most Recent Physical Functioning:  
Gross Assessment: 
  
         
  
Posture: 
  
Balance: 
  Bed Mobility: 
Supine to Sit: Minimum assistance Sit to Supine: Stand-by assistance Wheelchair Mobility: 
  
Transfers: 
Sit to Stand: Contact guard assistance Stand to Sit: Contact guard assistance Gait: 
  
Base of Support: Widened Speed/Winifred: Shuffled; Slow Distance (ft): 80 Feet (ft)(2 seated rests) Assistive Device: (HHA and railing when available) Ambulation - Level of Assistance: Contact guard assistance;Minimal assistance Body Structures Involved: 1. Lungs Body Functions Affected: 1. Sensory/Pain 2. Respiratory 3. Movement Related Activities and Participation Affected: 1. Mobility 2. Self Care 3. Domestic Life Number of elements that affect the Plan of Care: 3: MODERATE COMPLEXITY CLINICAL PRESENTATION:  
Presentation: Stable and uncomplicated: LOW COMPLEXITY CLINICAL DECISION MAKING:  
 [facility] Basic Mobility Inpatient Short Form How much difficulty does the patient currently have. .. Unable A Lot A Little None 1. Turning over in bed (including adjusting bedclothes, sheets and blankets)? ? 1   ? 2   ? 3   ? 4  
2. Sitting down on and standing up from a chair with arms ( e.g., wheelchair, bedside commode, etc.)   ? 1   ? 2   ? 3   ? 4  
3. Moving from lying on back to sitting on the side of the bed?   ? 1   ? 2   ? 3   ? 4 How much help from another person does the patient currently need. .. Total A Lot A Little None 4. Moving to and from a bed to a chair (including a wheelchair)? ? 1   ? 2   ? 3   ? 4  
5. Need to walk in hospital room? ? 1   ? 2   ? 3   ? 4  
6. Climbing 3-5 steps with a railing? ? 1   ? 2   ? 3   ? 4  
© 2007, Trustees of [address] [place] [address], under license to [employer]. All rights reserved Score:  Initial: 18 Most Recent: X (Date: -- ) Interpretation of Tool:  Represents activities that are increasingly more difficult (i.e. Bed mobility, Transfers, Gait). Medical Necessity:    
· Patient is expected to demonstrate progress in functional technique and activity tolerance ·  to increase independence with   and improve safety during all functional mobility. · . Reason for Services/Other Comments: 
· Patient continues to require skilled intervention due to medical complications and decline in functional mobility. · . Use of outcome tool(s) and clinical judgement create a POC that gives a: Clear prediction of patient's progress: LOW COMPLEXITY  
  
 
 
 
TREATMENT:  
(In addition to Assessment/Re-Assessment sessions the following treatments were rendered) Pre-treatment Symptoms/Complaints:  none. Pain: Initial:  
Pain Intensity 1: 0  Post Session:  9 Therapeutic Activity: (    25 minutes):   Therapeutic activities including Bed transfers, Chair transfers and Ambulation on level ground to improve mobility, strength, balance and endurance. Required minimal   to promote static and dynamic balance in standing. Braces/Orthotics/Lines/Etc:  
· Purewick · O2 Device: Nasal cannula Treatment/Session Assessment:   
· Response to Treatment:  pleasant and cooperative. · Interdisciplinary Collaboration:  
o Physical Therapy Assistant 
o Registered Nurse · After treatment position/precautions:  
o Supine in bed 
o Bed/Chair-wheels locked 
o Bed in low position 
o Call light within reach 
o RN notified · Compliance with Program/Exercises: Compliant most of the time · Recommendations/Intent for next treatment session: \"Next visit will focus on advancements to more challenging activities and reduction in assistance provided\". Total Treatment Duration: PT Patient Time In/Time Out Time In: 6035 Time Out: 1600 Anitha Iqbal PTA

## 2019-08-14 NOTE — PROGRESS NOTES
08/13/19 2132 Oxygen Therapy O2 Sat (%) 100 % Pulse via Oximetry 80 beats per minute O2 Device BIPAP  
FIO2 (%) 40 % Place home trilogy on Pt. No complications noted. Pt. resing well.

## 2019-08-14 NOTE — PROGRESS NOTES
Warfarin dosing per pharmacist 
 
Siva Wai Marsh is a 68 y.o. female. Height: 5' 2\" (157.5 cm)    Weight: (pt refused standing and lift wieght ) Indication:  Mechanical AVR Goal INR:  2-3 Home dose:  4 mg hs Risk factors or significant drug interactions:  -- Other anticoagulants:  --- Daily Monitoring Date  INR     Warfarin dose  HGB              Notes 8/7  2.5  ---   8.7 
8/8  2.7  1 mg   8.9 
8/9  2.9  Hold   8.1 8/10  2.4  ---   --- 
8/11  1.7  1 mg + 1 mg  --- 
8/12                 1.7                   1 mg                            7.9  
8/13  1.6  3 mg   8.2 
8/14  1.6  3 mg   --- Pharmacy consulted to manage warfarin for Ms. Corral during this admission. Pt was just recently admitted and discharged on 4 mg hs. INR 1.6. Continue increased dose of 3 mg tonight. Daily INR. Pharmacy will continue to follow. Please call with any questions. Thank you, Jeferson Ochoa, PharmD Clinical Pharmacist 
[phone]

## 2019-08-14 NOTE — PROGRESS NOTES
Pt resting in bed at this time. Daughter at the bedside. Alert and oriented times 3. No distress noted at this time. Respirations even and unlabored on 3 L NC. Purewick in place. Heparin drip infusing in right arm 12 units/kg/hr. Pt denies pain at this time. Pt and family instructed to call for assistance. Call light in place. Door open. Will continue to monitor.

## 2019-08-14 NOTE — PROGRESS NOTES
Nutrition Reason for assessment: LOS Day 7 Assessment:  
Diet: DIET REGULAR   
PMH: 
Past Medical History:  
Diagnosis Date  CAD (coronary artery disease) 1/20/2013  Cardiomyopathy ([address]  CHF (congestive heart failure) ([address] 2/17/2017  CKD (chronic kidney disease) stage 3, GFR 30-59 ml/min ([facility]) 7/10/2013  COPD (chronic obstructive pulmonary disease) ([address] 4/2/2014  Diabetes ([address]  Essential hypertension, benign 1/20/2013  ICD (implantable cardioverter-defibrillator) in place 10/2/2014  MDS (myelodysplastic syndrome) ([address] 12/17/2011  Pulmonary hypertension ([address] 6/15/2016 Food/Nutrition Patient History:  The patient reports that her appetite has been doing very well. She consumed 100% of her lunch today. She reports no po difficulties at this time. She has no questions or concerns regarding food or nutrition at this time. She does report that the  staff has been very helpful and nice. Anthropometrics:Height: 5' 2\" (157.5 cm),  Weight: 92.6 kg (204 lb 3.2 oz), Weight Source: Standing scale (comment), Body mass index is 37.35 kg/m². BMI class of obese. Macronutrient needs (based on IBW of 50 kg for CKD): EER:  3536-2635 kcal /day (30-35 kcal/kg) EPR:  40-50 grams protein/day (0.8-1 grams/kg) Intake/Comparative Standards: Average intake for past 8 recorded meal(s): 75%. This potentially meets ~100% of kcal and ~100% of protein needs. Nutrition Diagnosis: No acute nutrition related diagnosis at this time. Intervention: 
Meals and snacks: Continue current diet. Discharge Nutrition Plan: No discharge needs at this time. Preethi Gale[address], [address], [phone]

## 2019-08-15 NOTE — PROGRESS NOTES
08/14/19 2025 Oxygen Therapy O2 Sat (%) 99 % Pulse via Oximetry 63 beats per minute O2 Device (home bipap) O2 Flow Rate (L/min) 3 l/min 
(bled in) Respiratory Respiratory (WDL) X Respiratory Pattern Regular Chest/Tracheal Assessment Chest expansion, symmetrical  
Breath Sounds Bilateral Diminished Cough Strong CPAP/BIPAP  
CPAP/BIPAP Start/Stop On Device Mode AVAP 
(home settings) Mask Type and Size Full face Skin Condition Intact Pt's Home Machine Yes (type/vendor) Patient was placed on home bipap @ 2025 for the night. Patient will call if needed. Update: Patient was taken off home astral @ [phone] per patient request. 
 
Cont: Patient was place back on home machine @ 0040. Patient stated she is happy with the new machine and requested to be taken off earlier due to frustration with getting labs drawn.

## 2019-08-15 NOTE — PROGRESS NOTES
Pt complains about having to be \" stuck so many times\"  Explained protocol of being on heparin drip and need of increasing INR. Pt voiced understanding but hates being stuck for lab work. Pt wants to come off Trilogy and placed back to on 3L via NC per patient wish.

## 2019-08-15 NOTE — PROGRESS NOTES
Pt resting in bed with eyes closed at this time. Alert and oriented times 3. No distress noted at this time. Respirations even and unlabored on 3 L NC. Pt denies pain at this time. Heparin infusing 10 units/kg/hour at 18.5 ml/hr. Pt instructed to call for assistance if needed. Call light in place. Door open. Will continue to monitor.

## 2019-08-15 NOTE — PROGRESS NOTES
.1 at this time. No change in rate at this time. Rate continues at 10/units/kg/hour at 18.5ml/hour. Will continue to monitor.

## 2019-08-15 NOTE — PROGRESS NOTES
Pt resting in bed with eyes closed at this time. Alert and oriented to person place and time. Pt states not getting much sleep last night. No distress noted at this time. Respirations even and unlabored on 3 L NC. Pt states shoulder pain at this time. IV right and left AC clean dry and intact with no signs of infection noted at this time. Pt instructed to call for assistance if needed. Call light in place. Will continue to monitor.

## 2019-08-15 NOTE — PROGRESS NOTES
Pt sitting up in bed talking with granddaughter. No distress noted at this time. Pt with good po intake with dinner. Pt encouraged to call for assistance if needed call light in reach, door open will monitor.

## 2019-08-15 NOTE — PROGRESS NOTES
Pt complains of pain in right shoulder 9/10. Ultram 50 mg po given for pain with sip of water. Trilogy mask placed back on at present. Will monitor.

## 2019-08-15 NOTE — PROGRESS NOTES
Resp even and unlabored at rest with Trilogy on at present. Pt able to try trilogy again after breathing treatment by RRT.

## 2019-08-15 NOTE — PROGRESS NOTES
Bedside report received from Beatriz Hedrick RN. Pt in bed with 3L via NC. No distress. Daughter at bedside. Heparin drip verified. Instructed pt to call should needs arise. PT voiced understanding. Call light within reach. Will monitor.

## 2019-08-15 NOTE — PROGRESS NOTES
Warfarin dosing per pharmacist 
 
Jmaes Arthur is a 68 y.o. female. Height: 5' 2\" (157.5 cm)    Weight: (pt. refused; RN notified.) Indication:  Mechanical AVR Goal INR:  2-3 Home dose:  4 mg hs Risk factors or significant drug interactions:  -- Other anticoagulants:  Heparin bridge Daily Monitoring Date  INR     Warfarin dose  HGB              Notes 8/7  2.5  ---   8.7 
8/8  2.7  1 mg   8.9 
8/9  2.9  Hold   8.1 8/10  2.4  ---   --- 
8/11  1.7  1 mg + 1 mg  --- 
8/12                 1.7                   1 mg                            7.9  
8/13  1.6  3 mg   8.2 
8/14  1.6  3 mg   --- 
8/15  1.8  3 mg   7.8 Pharmacy consulted to manage warfarin for Ms. Corral during this admission. Pt was just recently admitted and discharged on 4 mg hs. INR to 1.8 with appropriate increase. No change today, continue 3 mg this evening. Following daily INR. Pharmacy will continue to follow. Please call with any questions. Thank you, Mica Giles, Pharm. D. Clinical Pharmacist 
[phone]

## 2019-08-15 NOTE — PROGRESS NOTES
Hospitalist Progress Note Patient: Joel Gregory MRN: [id number]  SSN: [id number] YOB: 1950  Age: 68 y.o. Sex: female Admit Date: 8/7/2019 LOS: 8 days Subjective:  
 
From previous notes: \"69 y.o.  female with chronic respiratory failure on 3lpm & nocturnal Trilogy who presents with respiratory distress.  Was found by family members this morning with her mask off, \"not looking good\", swollen and EMS was called. Family states she called about 5-6PM last night and reported Trilogy was alarming and was placing herself on O2. Unknown how long mask was off. Daughter said this morning she was conversant but then \"went out\". From last admission she has a DNR in place but family felt she was \"suffering\" today, O2 sat per family was 35% and they allowed nasal intubation.  Per EMS O2 sat was 50%--was not responsive.  She received Versed in route and in the ER then placed on Versed drip as she began to move some.   Was just discharged home Friday after hospital stay for syncopal type episode. Ethan Lakes been diagnosed with small bowel mass, Heparin bridge for AVR and endoscopy was planned but cancelled per patient desires. Maggie Begum is being admitted to the ICU on vent support for acute hypoxic respiratory failure. \" 
 
8/15 - She feels good today. Slept well on new machine. Denies CP/SOB. Denies F/C/N/V. Review of systems negative except stated above. Objective:  
 
Visit Vitals /45 (BP 1 Location: Left arm, BP Patient Position: At rest) Pulse 61 Temp 97.9 °F (36.6 °C) Resp 18 Ht 5' 2\" (1.575 m) Wt 92.6 kg (204 lb 3.2 oz) SpO2 95% BMI 37.35 kg/m² Oxygen Therapy O2 Sat (%): 95 % (08/15/19 0838) Pulse via Oximetry: 74 beats per minute (08/15/19 0692) O2 Device: Nasal cannula (08/15/19 0794) O2 Flow Rate (L/min): 3 l/min (08/15/19 7383) FIO2 (%): 40 % (08/13/19 2132) Intake and Output: Intake/Output Summary (Last 24 hours) at 8/15/2019 0957 Last data filed at 8/15/2019 7025 Gross per 24 hour Intake 476 ml Output 600 ml Net -124 ml Physical Exam:  
GENERAL: alert, cooperative, no distress, appears stated age EYE: conjunctivae/corneas clear. PERRL. THROAT & NECK: normal and no erythema or exudates noted. LUNG: Bibasilar crackles HEART: regular rate and rhythm, S1S2, no murmur, no JVD ABDOMEN: soft, non-tender, non-distended. Bowel sounds normal.  
EXTREMITIES:  1+ BLE edema, 2+ pedal/radial pulses bilaterally SKIN: no rash or abnormalities NEUROLOGIC: A&Ox3. Cranial nerves 2-12 grossly intact. Lab/Data Review: 
Recent Results (from the past 24 hour(s)) PTT Collection Time: 08/14/19 12:07 PM  
Result Value Ref Range aPTT >200.0 (HH) 24.7 - 39.8 SEC  
CBC WITH AUTOMATED DIFF Collection Time: 08/14/19 12:07 PM  
Result Value Ref Range WBC 6.3 4.3 - 11.1 K/uL  
 RBC 2.85 (L) 4.05 - 5.2 M/uL HGB 8.1 (L) 11.7 - 15.4 g/dL HCT 27.4 (L) 35.8 - 46.3 % MCV 96.1 79.6 - 97.8 FL  
 MCH 28.4 26.1 - 32.9 PG  
 MCHC 29.6 (L) 31.4 - 35.0 g/dL  
 RDW 15.9 (H) 11.9 - 14.6 % PLATELET 268 (L) 479 - 450 K/uL MPV 11.0 9.4 - 12.3 FL ABSOLUTE NRBC 0.00 0.0 - 0.2 K/uL  
 DF AUTOMATED NEUTROPHILS 74 43 - 78 % LYMPHOCYTES 14 13 - 44 % MONOCYTES 8 4.0 - 12.0 % EOSINOPHILS 3 0.5 - 7.8 % BASOPHILS 1 0.0 - 2.0 % IMMATURE GRANULOCYTES 0 0.0 - 5.0 %  
 ABS. NEUTROPHILS 4.7 1.7 - 8.2 K/UL  
 ABS. LYMPHOCYTES 0.9 0.5 - 4.6 K/UL  
 ABS. MONOCYTES 0.5 0.1 - 1.3 K/UL  
 ABS. EOSINOPHILS 0.2 0.0 - 0.8 K/UL  
 ABS. BASOPHILS 0.0 0.0 - 0.2 K/UL  
 ABS. IMM. GRANS. 0.0 0.0 - 0.5 K/UL  
PTT Collection Time: 08/14/19  3:08 PM  
Result Value Ref Range aPTT 74.7 (H) 24.7 - 39.8 SEC  
PTT Collection Time: 08/14/19 11:18 PM  
Result Value Ref Range aPTT 149.9 (H) 24.7 - 39.8 SEC  
CBC WITH AUTOMATED DIFF  Collection Time: 08/15/19  7:34 AM  
 Result Value Ref Range WBC 6.2 4.3 - 11.1 K/uL  
 RBC 2.74 (L) 4.05 - 5.2 M/uL HGB 7.8 (L) 11.7 - 15.4 g/dL HCT 26.5 (L) 35.8 - 46.3 % MCV 96.7 79.6 - 97.8 FL  
 MCH 28.5 26.1 - 32.9 PG  
 MCHC 29.4 (L) 31.4 - 35.0 g/dL  
 RDW 15.8 (H) 11.9 - 14.6 % PLATELET 246 (L) 063 - 450 K/uL MPV 11.9 9.4 - 12.3 FL ABSOLUTE NRBC 0.00 0.0 - 0.2 K/uL NEUTROPHILS 75 43 - 78 % LYMPHOCYTES 14 13 - 44 % MONOCYTES 7 4.0 - 12.0 % EOSINOPHILS 3 0.5 - 7.8 % BASOPHILS 1 0.0 - 2.0 % IMMATURE GRANULOCYTES 0 0.0 - 5.0 %  
 ABS. NEUTROPHILS 4.6 1.7 - 8.2 K/UL  
 ABS. LYMPHOCYTES 0.9 0.5 - 4.6 K/UL  
 ABS. MONOCYTES 0.4 0.1 - 1.3 K/UL  
 ABS. EOSINOPHILS 0.2 0.0 - 0.8 K/UL  
 ABS. BASOPHILS 0.1 0.0 - 0.2 K/UL  
 ABS. IMM. GRANS. 0.0 0.0 - 0.5 K/UL  
 DF AUTOMATED    
PTT Collection Time: 08/15/19  7:34 AM  
Result Value Ref Range aPTT 121.1 (H) 24.7 - 39.8 SEC PROTHROMBIN TIME + INR Collection Time: 08/15/19  7:34 AM  
Result Value Ref Range Prothrombin time 21.4 (H) 11.7 - 14.5 sec INR 1.8 RENAL FUNCTION PANEL Collection Time: 08/15/19  7:34 AM  
Result Value Ref Range Sodium 140 136 - 145 mmol/L Potassium 4.0 3.5 - 5.1 mmol/L Chloride 97 (L) 98 - 107 mmol/L  
 CO2 36 (H) 21 - 32 mmol/L Anion gap 7 7 - 16 mmol/L Glucose 109 (H) 65 - 100 mg/dL BUN 58 (H) 8 - 23 MG/DL Creatinine 2.00 (H) 0.6 - 1.0 MG/DL  
 GFR est AA 32 (L) >60 ml/min/1.73m2 GFR est non-AA 26 (L) >60 ml/min/1.73m2 Calcium 9.3 8.3 - 10.4 MG/DL Phosphorus 4.5 (H) 2.3 - 3.7 MG/DL Albumin 3.0 (L) 3.2 - 4.6 g/dL Imaging: Xr Chest Sngl V Result Date: 8/9/2019 EXAM: XR CHEST SNGL V INDICATION: Pulmonary edema COMPARISON: 8/8/2019 FINDINGS: A portable AP radiograph of the chest was obtained at 0421 hours. The patient is on a cardiac monitor. Left lower lobe lobar atelectasis unchanged. . Cardiomegaly unchanged. .  The bones and soft tissues are grossly within normal limits. IMPRESSION: Left lower lobe lobar atelectasis unchanged. Cardiomegaly unchanged. Xr Chest Sngl V Result Date: 8/8/2019 EXAM: XR CHEST SNGL V INDICATION: Heart failure COMPARISON: 8/7/2019 FINDINGS: A portable AP radiograph of the chest was obtained at 0423 hours. The patient is on a cardiac monitor. Lobar atelectasis of the left lower lobe. Endotracheal tubes in adequate position. The cardiac and mediastinal contours and pulmonary vascularity are normal.  The bones and soft tissues are grossly within normal limits. IMPRESSION: Lobar atelectasis of the left lower lobe. Xr Chest Sngl V Result Date: 8/7/2019 CHEST X-RAY, single portable view  8/7/2019 History: Intubated. Technique: Single frontal view of the chest. Comparison: Chest x-ray 7/28/2019 Findings: A stable left-sided intracardiac device is seen. An endotracheal tube is now seen. The tip of this is low in position located at the robert directed towards the left mainstem bronchus. Retraction of this by 3 cm should place the tip at the level of a clavicles. Stable moderate to severe cardiomegaly is seen. Lungs are expanded without appreciable pneumothorax. No evolving consolidation, pleural effusion is seen. IMPRESSION: 1. Low position of endotracheal tube tip located at the robert with the tip directed towards the left mainstem bronchus. Recommend retraction of this by 3 cm and a repeat chest x-ray to confirm position. 2. Stable cardiomegaly. Xr Shoulder Rt Ap/lat Min 2 V Result Date: 8/7/2019 THREE-VIEW RIGHT SHOULDER, TWO-VIEW RIGHT HUMERUS, TWO-VIEW RIGHT FOREARM: CLINICAL HISTORY:  Right upper extremity pain after fall. COMPARISON:  None. FINDINGS:  No definite fracture, malalignment, or oly bone destruction is evident. Right shoulder and humerus of June 6, 2019. Impaction injury is again noted at the medial aspect of the junction of the humeral head and neck.   New calcification projecting over the central and medial space suggests the possibility of calcific tendinitis of the rotator cuff. No persistent radiopaque foreign body is seen. IMPRESSION:  1. No definite acute bony abnormality identified. 2.  Probable chronic impaction at the medial aspect of the junction of the humeral head and neck 3. Probable calcific tendinitis of the rotator cuff. Xr Humerus Rt Result Date: 8/7/2019 THREE-VIEW RIGHT SHOULDER, TWO-VIEW RIGHT HUMERUS, TWO-VIEW RIGHT FOREARM: CLINICAL HISTORY:  Right upper extremity pain after fall. COMPARISON:  None. FINDINGS:  No definite fracture, malalignment, or oly bone destruction is evident. Right shoulder and humerus of June 6, 2019. Impaction injury is again noted at the medial aspect of the junction of the humeral head and neck. New calcification projecting over the central and medial space suggests the possibility of calcific tendinitis of the rotator cuff. No persistent radiopaque foreign body is seen. IMPRESSION:  1. No definite acute bony abnormality identified. 2.  Probable chronic impaction at the medial aspect of the junction of the humeral head and neck 3. Probable calcific tendinitis of the rotator cuff. Xr Forearm Rt Ap/lat Result Date: 8/7/2019 THREE-VIEW RIGHT SHOULDER, TWO-VIEW RIGHT HUMERUS, TWO-VIEW RIGHT FOREARM: CLINICAL HISTORY:  Right upper extremity pain after fall. COMPARISON:  None. FINDINGS:  No definite fracture, malalignment, or oly bone destruction is evident. Right shoulder and humerus of June 6, 2019. Impaction injury is again noted at the medial aspect of the junction of the humeral head and neck. New calcification projecting over the central and medial space suggests the possibility of calcific tendinitis of the rotator cuff. No persistent radiopaque foreign body is seen. IMPRESSION:  1. No definite acute bony abnormality identified.  2.  Probable chronic impaction at the medial aspect of the junction of the humeral head and neck 3. Probable calcific tendinitis of the rotator cuff. Ct Head Wo Cont Result Date: 7/29/2019 Examination: CT scan of the brain without contrast. History: Confusion/delirium, altered LOC, unexplained, 68 years Female Patient presents with daughter. Daughter states patient is supposed to use a walker to ambulate but does not always. Patient was found this afternoon face up, sideways, on the bed. Daughter states they assume syncopal episode, patient has a history of falls. Patient does not recall incident. Daughter brought patient because she is lethargic, patient was able to eat a small meal PTA. Patient denies pain or injuries. Patient on 3L NC at all times for COPD. Daughter states hx of HTN, took medication just PTA with meal Technique: 5 mm axial imaging of the brain from the posterior fossa to the vertex. Radiation dose reduction techniques were used for this study:  Our CT scanners use one or all of the following: Automated exposure control, adjustment of the mA and/or kVp according to patient's size, iterative reconstruction. Comparison:  CT brain September 07, 2018 Findings: Probable mild microvascular disease unchanged. The ventricles, sulci are age-appropriate. No intracranial hemorrhage or extra-axial collection is identified. No evidence of acute infarct. No mass effect or midline shift is present. Basal cisterns are intact. Small fluid levels are seen in the bilateral maxillary sinuses. Mild mucosal reaction bilateral ethmoid sinuses. The visualized mastoid air cells are clear. The orbits, bones, and soft tissues are normal in appearance. Image quality somewhat degraded by motion artifact. Impression:  No acute intracranial abnormality. Paranasal sinus mucosal disease as above. Xr Chest [facility] Result Date: 8/7/2019 CHEST X-RAY, single portable view  8/7/2019 History: Tube repositioning.  Technique: Single frontal view of the chest. Comparison: Chest x-ray 8/7/2019 Findings: A stable left-sided intracardiac device is seen. The patient is intubated. The endotracheal tube has been repositioned and is now in good position with the line 3 cm above the robert. Stable cardiomegaly is seen. Lungs are expanded without appreciable pneumothorax. No evolving consolidation, or pleural effusion is seen. IMPRESSION: 1. Good position of endotracheal tube. 2. Stable cardiomegaly. Xr Chest [facility] Result Date: 7/28/2019 AP chest radiograph History: syncope, 68 years Female Comparison: Chest radiograph June 07, 2019 Findings:  Cardiac pacing device obscures part of the left chest wall, single lead appears to remain in anatomic position. Normal cardiomediastinal silhouette with evidence of CABG. Persistent mild hyperinflation suggestive of COPD. Nonspecific mild diffuse interstitial prominence appears slightly increased since prior, may represent edema in the appropriate clinical setting. Trace bilateral pleural effusions appear slightly increased since prior. Mild subsegmental atelectasis bilateral lung bases. No evidence of pneumothorax. Visualized soft tissue and osseous structures otherwise unremarkable. Impression:  Suspect worsening mild interstitial edema. No results found for this visit on 08/07/19. Cultures: All Micro Results Procedure Component Value Units Date/Time CULTURE, BLOOD [[id number]] Collected:  08/07/19 0901 Order Status:  Completed Specimen:  Blood Updated:  08/12/19 1106 Special Requests: --     
  RIGHT Antecubital 
  
  Culture result: NO GROWTH 5 DAYS     
 CULTURE, BLOOD [[id number]] Collected:  08/07/19 1959 Order Status:  Completed Specimen:  Blood Updated:  08/12/19 1106 Special Requests: --     
  LEFT 
HAND Culture result: NO GROWTH 5 DAYS Assessment/Plan:  
 
Principal Problem: 
  Acute on chronic respiratory failure with hypoxia ([address] (8/7/2019) - Resolving 
- Continue Albuterol - Continue Lasix - Continue vent machine at night Active Problems: 
  Acute exacerbation of CHF (congestive heart failure) ([facility] [address] (11/14/2018) - No acute issues - Continue Coreg 
- Continue Lasix - Strict I/O Acute renal failure superimposed on chronic kidney disease ([address] (8/8/2019) - Creatinine improved - Continue current Lasix dose - Repeat BMP in AM 
 
  MDS (myelodysplastic syndrome) ([facility] [address] (12/17/2011) Chronic combined systolic and diastolic heart failure ([address] (1/20/2013) - Continue Coreg 
- Continue Lasix CKD (chronic kidney disease) stage 4, GFR 15-29 ml/min ([facility]) (7/10/2013) COPD (chronic obstructive pulmonary disease) ([address] (4/2/2014) CAD (coronary artery disease) (1/20/2013) - No acute CP 
- Continue ASA - Continue Coreg REJI (obstructive sleep apnea)-cpap (4/2/2014) S/P AVR (aortic valve replacement) () 
- Continue Warfarin - INR 1.8 today 
- Continue Heparin gtt 
- Goal INR 2.5 prior to discharge Cardiomyopathy ([address] () Type 2 diabetes mellitus with nephropathy ([facility] [address] (12/18/2017) - No acute issues ICD (implantable cardioverter-defibrillator) in place (10/2/2014) Debility (9/8/2018) Severe obesity ([address] (1/15/2019) Dispo - Continue current meds. Continue Heparin gtt. Discharge when INR >2.5. DIET REGULAR 
 
DVT Prophylaxis: Warfarin Signed By: Leah Mike DO August 15, 2019

## 2019-08-16 NOTE — PROGRESS NOTES
Hospitalist Progress Note    Patient: Oscar Morris MRN: [id number]  SSN: [id number]    YOB: 1950  Age: 68 y.o. Sex: female      Admit Date: 8/7/2019    LOS: 9 days     Subjective:     From previous notes: \"69 y.o.  female with chronic respiratory failure on 3lpm & nocturnal Trilogy who presents with respiratory distress.  Was found by family members this morning with her mask off, \"not looking good\", swollen and EMS was called. Family states she called about 5-6PM last night and reported Trilogy was alarming and was placing herself on O2. Unknown how long mask was off. Daughter said this morning she was conversant but then \"went out\". From last admission she has a DNR in place but family felt she was \"suffering\" today, O2 sat per family was 35% and they allowed nasal intubation.  Per EMS O2 sat was 50%--was not responsive.  She received Versed in route and in the ER then placed on Versed drip as she began to move some.   Was just discharged home Friday after hospital stay for syncopal type episode. Will Moreiraane been diagnosed with small bowel mass, Heparin bridge for AVR and endoscopy was planned but cancelled per patient desires. Tre Colvin is being admitted to the ICU on vent support for acute hypoxic respiratory failure. \"    8/15 - She feels good today. Asking for a blood transfusion. Denies CP/SOB. Denies F/C/N/V. Review of systems negative except stated above.     Objective:     Visit Vitals  BP (!) 132/94 (BP 1 Location: Left arm, BP Patient Position: At rest)   Pulse 66   Temp 98.1 °F (36.7 °C)   Resp 17   Ht 5' 2\" (1.575 m)   Wt 92.6 kg (204 lb 3.2 oz)   SpO2 96%   BMI 37.35 kg/m²      Oxygen Therapy  O2 Sat (%): 96 % (08/16/19 0752)  Pulse via Oximetry: 60 beats per minute (08/16/19 0752)  O2 Device: Nasal cannula (08/16/19 0752)  O2 Flow Rate (L/min): 3 l/min (08/16/19 0752)  FIO2 (%): 40 % (08/13/19 1717)      Intake and Output:     Intake/Output Summary (Last 24 hours) at 8/16/2019 0951  Last data filed at 8/16/2019 2445  Gross per 24 hour   Intake 236 ml   Output 850 ml   Net -614 ml         Physical Exam:   GENERAL: alert, cooperative, no distress, appears stated age  EYE: conjunctivae/corneas clear. PERRL. THROAT & NECK: normal and no erythema or exudates noted. LUNG: Bibasilar crackles  HEART: regular rate and rhythm, S1S2, no murmur, no JVD  ABDOMEN: soft, non-tender, non-distended. Bowel sounds normal.   EXTREMITIES:  1+ BLE edema, 2+ pedal/radial pulses bilaterally  SKIN: no rash or abnormalities  NEUROLOGIC: A&Ox3. Cranial nerves 2-12 grossly intact. Lab/Data Review:  Recent Results (from the past 24 hour(s))   PTT    Collection Time: 08/15/19  1:53 PM   Result Value Ref Range    aPTT 111.5 (H) 24.7 - 39.8 SEC   PTT    Collection Time: 08/15/19  7:58 PM   Result Value Ref Range    aPTT 110.1 (H) 24.7 - 39.8 SEC   PROTHROMBIN TIME + INR    Collection Time: 08/16/19  4:48 AM   Result Value Ref Range    Prothrombin time 20.3 (H) 11.7 - 14.5 sec    INR 1.7     CBC WITH AUTOMATED DIFF    Collection Time: 08/16/19  4:48 AM   Result Value Ref Range    WBC 6.0 4.3 - 11.1 K/uL    RBC 2.69 (L) 4.05 - 5.2 M/uL    HGB 7.6 (L) 11.7 - 15.4 g/dL    HCT 25.7 (L) 35.8 - 46.3 %    MCV 95.5 79.6 - 97.8 FL    MCH 28.3 26.1 - 32.9 PG    MCHC 29.6 (L) 31.4 - 35.0 g/dL    RDW 15.7 (H) 11.9 - 14.6 %    PLATELET 874 (L) 116 - 450 K/uL    MPV 11.8 9.4 - 12.3 FL    ABSOLUTE NRBC 0.00 0.0 - 0.2 K/uL    DF AUTOMATED      NEUTROPHILS 68 43 - 78 %    LYMPHOCYTES 20 13 - 44 %    MONOCYTES 8 4.0 - 12.0 %    EOSINOPHILS 2 0.5 - 7.8 %    BASOPHILS 1 0.0 - 2.0 %    IMMATURE GRANULOCYTES 0 0.0 - 5.0 %    ABS. NEUTROPHILS 4.1 1.7 - 8.2 K/UL    ABS. LYMPHOCYTES 1.2 0.5 - 4.6 K/UL    ABS. MONOCYTES 0.5 0.1 - 1.3 K/UL    ABS. EOSINOPHILS 0.1 0.0 - 0.8 K/UL    ABS. BASOPHILS 0.0 0.0 - 0.2 K/UL    ABS. IMM.  GRANS. 0.0 0.0 - 0.5 K/UL   RENAL FUNCTION PANEL    Collection Time: 08/16/19  4:48 AM   Result Value Ref Range    Sodium 139 136 - 145 mmol/L    Potassium 4.5 3.5 - 5.1 mmol/L    Chloride 97 (L) 98 - 107 mmol/L    CO2 35 (H) 21 - 32 mmol/L    Anion gap 7 7 - 16 mmol/L    Glucose 107 (H) 65 - 100 mg/dL    BUN 61 (H) 8 - 23 MG/DL    Creatinine 2.03 (H) 0.6 - 1.0 MG/DL    GFR est AA 31 (L) >60 ml/min/1.73m2    GFR est non-AA 26 (L) >60 ml/min/1.73m2    Calcium 9.4 8.3 - 10.4 MG/DL    Phosphorus 4.1 (H) 2.3 - 3.7 MG/DL    Albumin 3.0 (L) 3.2 - 4.6 g/dL   PTT    Collection Time: 08/16/19  4:48 AM   Result Value Ref Range    aPTT 100.0 (H) 24.7 - 39.8 SEC       Imaging:  Xr Chest Sngl V    Result Date: 8/9/2019  EXAM: XR CHEST SNGL V INDICATION: Pulmonary edema COMPARISON: 8/8/2019 FINDINGS: A portable AP radiograph of the chest was obtained at 0421 hours. The patient is on a cardiac monitor. Left lower lobe lobar atelectasis unchanged. . Cardiomegaly unchanged. .  The bones and soft tissues are grossly within normal limits. IMPRESSION: Left lower lobe lobar atelectasis unchanged. Cardiomegaly unchanged. Xr Chest Sngl V    Result Date: 8/8/2019  EXAM: XR CHEST SNGL V INDICATION: Heart failure COMPARISON: 8/7/2019 FINDINGS: A portable AP radiograph of the chest was obtained at 0423 hours. The patient is on a cardiac monitor. Lobar atelectasis of the left lower lobe. Endotracheal tubes in adequate position. The cardiac and mediastinal contours and pulmonary vascularity are normal.  The bones and soft tissues are grossly within normal limits. IMPRESSION: Lobar atelectasis of the left lower lobe. Xr Chest Sngl V    Result Date: 8/7/2019  CHEST X-RAY, single portable view  8/7/2019 History: Intubated. Technique: Single frontal view of the chest. Comparison: Chest x-ray 7/28/2019 Findings: A stable left-sided intracardiac device is seen. An endotracheal tube is now seen. The tip of this is low in position located at the robert directed towards the left mainstem bronchus.  Retraction of this by 3 cm should place the tip at the level of a clavicles. Stable moderate to severe cardiomegaly is seen. Lungs are expanded without appreciable pneumothorax. No evolving consolidation, pleural effusion is seen. IMPRESSION: 1. Low position of endotracheal tube tip located at the robert with the tip directed towards the left mainstem bronchus. Recommend retraction of this by 3 cm and a repeat chest x-ray to confirm position. 2. Stable cardiomegaly. Xr Shoulder Rt Ap/lat Min 2 V    Result Date: 8/7/2019  THREE-VIEW RIGHT SHOULDER, TWO-VIEW RIGHT HUMERUS, TWO-VIEW RIGHT FOREARM: CLINICAL HISTORY:  Right upper extremity pain after fall. COMPARISON:  None. FINDINGS:  No definite fracture, malalignment, or oly bone destruction is evident. Right shoulder and humerus of June 6, 2019. Impaction injury is again noted at the medial aspect of the junction of the humeral head and neck. New calcification projecting over the central and medial space suggests the possibility of calcific tendinitis of the rotator cuff. No persistent radiopaque foreign body is seen. IMPRESSION:  1. No definite acute bony abnormality identified. 2.  Probable chronic impaction at the medial aspect of the junction of the humeral head and neck 3. Probable calcific tendinitis of the rotator cuff. Xr Humerus Rt    Result Date: 8/7/2019  THREE-VIEW RIGHT SHOULDER, TWO-VIEW RIGHT HUMERUS, TWO-VIEW RIGHT FOREARM: CLINICAL HISTORY:  Right upper extremity pain after fall. COMPARISON:  None. FINDINGS:  No definite fracture, malalignment, or oly bone destruction is evident. Right shoulder and humerus of June 6, 2019. Impaction injury is again noted at the medial aspect of the junction of the humeral head and neck. New calcification projecting over the central and medial space suggests the possibility of calcific tendinitis of the rotator cuff. No persistent radiopaque foreign body is seen. IMPRESSION:  1. No definite acute bony abnormality identified. 2. Probable chronic impaction at the medial aspect of the junction of the humeral head and neck 3. Probable calcific tendinitis of the rotator cuff. Xr Forearm Rt Ap/lat    Result Date: 8/7/2019  THREE-VIEW RIGHT SHOULDER, TWO-VIEW RIGHT HUMERUS, TWO-VIEW RIGHT FOREARM: CLINICAL HISTORY:  Right upper extremity pain after fall. COMPARISON:  None. FINDINGS:  No definite fracture, malalignment, or oly bone destruction is evident. Right shoulder and humerus of June 6, 2019. Impaction injury is again noted at the medial aspect of the junction of the humeral head and neck. New calcification projecting over the central and medial space suggests the possibility of calcific tendinitis of the rotator cuff. No persistent radiopaque foreign body is seen. IMPRESSION:  1. No definite acute bony abnormality identified. 2.  Probable chronic impaction at the medial aspect of the junction of the humeral head and neck 3. Probable calcific tendinitis of the rotator cuff. Ct Head Wo Cont    Result Date: 7/29/2019  Examination: CT scan of the brain without contrast. History: Confusion/delirium, altered LOC, unexplained, 68 years Female Patient presents with daughter. Daughter states patient is supposed to use a walker to ambulate but does not always. Patient was found this afternoon face up, sideways, on the bed. Daughter states they assume syncopal episode, patient has a history of falls. Patient does not recall incident. Daughter brought patient because she is lethargic, patient was able to eat a small meal PTA. Patient denies pain or injuries. Patient on 3L NC at all times for COPD. Daughter states hx of HTN, took medication just PTA with meal Technique: 5 mm axial imaging of the brain from the posterior fossa to the vertex.   Radiation dose reduction techniques were used for this study:  Our CT scanners use one or all of the following: Automated exposure control, adjustment of the mA and/or kVp according to patient's size, iterative reconstruction. Comparison:  CT brain September 07, 2018 Findings: Probable mild microvascular disease unchanged. The ventricles, sulci are age-appropriate. No intracranial hemorrhage or extra-axial collection is identified. No evidence of acute infarct. No mass effect or midline shift is present. Basal cisterns are intact. Small fluid levels are seen in the bilateral maxillary sinuses. Mild mucosal reaction bilateral ethmoid sinuses. The visualized mastoid air cells are clear. The orbits, bones, and soft tissues are normal in appearance. Image quality somewhat degraded by motion artifact. Impression:  No acute intracranial abnormality. Paranasal sinus mucosal disease as above. Xr Chest Port    Result Date: 8/7/2019  CHEST X-RAY, single portable view  8/7/2019 History: Tube repositioning. Technique: Single frontal view of the chest. Comparison: Chest x-ray 8/7/2019 Findings: A stable left-sided intracardiac device is seen. The patient is intubated. The endotracheal tube has been repositioned and is now in good position with the line 3 cm above the robert. Stable cardiomegaly is seen. Lungs are expanded without appreciable pneumothorax. No evolving consolidation, or pleural effusion is seen. IMPRESSION: 1. Good position of endotracheal tube. 2. Stable cardiomegaly. Xr Chest Port    Result Date: 7/28/2019  AP chest radiograph History: syncope, 68 years Female Comparison: Chest radiograph June 07, 2019 Findings:  Cardiac pacing device obscures part of the left chest wall, single lead appears to remain in anatomic position. Normal cardiomediastinal silhouette with evidence of CABG. Persistent mild hyperinflation suggestive of COPD. Nonspecific mild diffuse interstitial prominence appears slightly increased since prior, may represent edema in the appropriate clinical setting. Trace bilateral pleural effusions appear slightly increased since prior.   Mild subsegmental atelectasis bilateral lung bases. No evidence of pneumothorax. Visualized soft tissue and osseous structures otherwise unremarkable. Impression:  Suspect worsening mild interstitial edema. No results found for this visit on 08/07/19. Cultures:   All Micro Results     Procedure Component Value Units Date/Time    CULTURE, BLOOD [[id number]] Collected:  08/07/19 0953    Order Status:  Completed Specimen:  Blood Updated:  08/12/19 1106     Special Requests: --        RIGHT  Antecubital       Culture result: NO GROWTH 5 DAYS       CULTURE, BLOOD [[id number]] Collected:  08/07/19 1959    Order Status:  Completed Specimen:  Blood Updated:  08/12/19 1106     Special Requests: --        LEFT  HAND       Culture result: NO GROWTH 5 DAYS             Assessment/Plan:     Principal Problem:    Acute on chronic respiratory failure with hypoxia ([facility]) (8/7/2019)  - Resolving  - Continue Albuterol  - Continue Lasix  - Continue vent machine at night    Active Problems:    Acute exacerbation of CHF (congestive heart failure) ([facility] [address] (11/14/2018)  - No acute issues  - Continue Coreg  - Continue Lasix  - Strict I/O      Acute renal failure superimposed on chronic kidney disease ([facility] [address] (8/8/2019)  - Creatinine improved  - Continue current Lasix dose  - Repeat BMP in AM      MDS (myelodysplastic syndrome) ([facility] [address] (12/17/2011)      Chronic combined systolic and diastolic heart failure ([facility]) (1/20/2013)  - Continue Coreg  - Continue Lasix      CKD (chronic kidney disease) stage 4, GFR 15-29 ml/min ([facility]) (7/10/2013)      COPD (chronic obstructive pulmonary disease) ([facility] [address] (4/2/2014)      CAD (coronary artery disease) (1/20/2013)  - No acute CP  - Continue ASA  - Continue Coreg      REJI (obstructive sleep apnea)-cpap (4/2/2014)      S/P AVR (aortic valve replacement) ()  - Continue Warfarin  - INR 1.7 today  - Continue Heparin gtt  - Goal INR 2.5 prior to discharge      Cardiomyopathy ([facility] [address] ()      Type 2 diabetes mellitus with nephropathy ([facility] [address] (12/18/2017)  - No acute issues      ICD (implantable cardioverter-defibrillator) in place (10/2/2014)      Debility (9/8/2018)      Severe obesity ([facility] [address] (1/15/2019)    Dispo - Continue current meds. Continue Heparin gtt. Discharge when INR >2.5.     DIET REGULAR    DVT Prophylaxis: Warfarin      Signed By: Bao Mcclure DO     August 16, 2019

## 2019-08-16 NOTE — PROGRESS NOTES
Placed patient on Home BiPAP stated \"it doesn't feel right\" and wanted off.  Patient is refusing home machine at this time and states she would like to sleep with her NC.

## 2019-08-16 NOTE — PROGRESS NOTES
Bedside report received from night nurse Rashmi Mark. Assessment done as noted  Respiration even and unlabored 20/min; denies pain or nausea at present. Encouraged to call with needs.

## 2019-08-16 NOTE — PROGRESS NOTES
Problem: Mobility Impaired (Adult and Pediatric)  Goal: *Acute Goals and Plan of Care (Insert Text)  Description  LTG:  (1.)Ms. Samira Kennedy will move from supine to sit and sit to supine, scoot up and down and roll side to side in bed with INDEPENDENT within 7 day(s). (2.)Ms. Samira Kennedy will transfer from bed to chair and chair to bed with modified INDEPENDENT using the least restrictive device within 7 day(s). (3.)Ms. Samira Kennedy will ambulate with modified INDEPENDENCE for 50+ feet with the least restrictive device within 7 day(s). (4.)Ms. Samira Kennedy will perform standing static and dynamic balance activities x 8 minutes with SUPERVISION to improve safety within 7 day(s). (5.)Ms. Samira Kennedy will ascend and descend 4 stairs using one hand rail(s) with CGA to improve functional mobility and safety within 7 day(s). Outcome: Progressing Towards Goal     PHYSICAL THERAPY: Daily Note and AM 8/16/2019  INPATIENT: PT Visit Days : 3  Legally blind  [place] / [place] / Product Type: Medicare /       NAME/AGE/GENDER: Satish Gates is a 68 y.o. female   PRIMARY DIAGNOSIS: Acute on chronic respiratory failure with hypoxia (HCC) [J96.21] Acute on chronic respiratory failure with hypoxia ([facility]   Acute on chronic respiratory failure with hypoxia ([facility]         ICD-10: Treatment Diagnosis:    · Other abnormalities of gait and mobility (R26.89)  · Repeated Falls (R29.6)   Precaution/Allergies:  Ferrlecit [sodium ferric gluconat-sucrose]; Sulfa (sulfonamide antibiotics); and Aspirin      ASSESSMENT:     Ms. Samira Kennedy presents supine and willing to walk. She needed a little assist to sit up from supine and and moderate assist to scoot to the edge as she has little use of her R arm. She walked about the same as last visit with 3 seated rests. She is on 3L resting but needed 4L with activity and gets moderately SOB and is mostly unable to breath through her nose.    Once seated she needed to use the bathroom and was assisted another 10 feet to the bathroom and back. She needed help with cleanup. Left up in the chair. This section established at most recent assessment   PROBLEM LIST (Impairments causing functional limitations):  1. Decreased Transfer Abilities  2. Decreased Ambulation Ability/Technique  3. Decreased Balance  4. Increased Pain  5. Decreased Activity Tolerance   INTERVENTIONS PLANNED: (Benefits and precautions of physical therapy have been discussed with the patient.)  1. Balance Exercise  2. Bed Mobility  3. Gait Training  4. Therapeutic Activites  5. Therapeutic Exercise/Strengthening  6. Transfer Training  7. education      TREATMENT PLAN: Frequency/Duration: 3 times a week for duration of hospital stay  Rehabilitation Potential For Stated Goals: Good     REHAB RECOMMENDATIONS (at time of discharge pending progress):    Placement: It is my opinion, based on this patient's performance to date, that Ms. Diaz may benefit from [address] after discharge due to the functional deficits listed above that are likely to improve with skilled rehabilitation because he/she has multiple medical issues that affect his/her functional mobility in the community. Equipment:    None at this time              HISTORY:   History of Present Injury/Illness (Reason for Referral):  Per MD note, \"Patient is a 68 y.o.  female with chronic respiratory failure on 3lpm & nocturnal Trilogy who presents with respiratory distress. Was found by family members this morning with her mask off, \"not looking good\", swollen and EMS was called. Family states she called about 5-6PM last night and reported Trilogy was alarming and was placing herself on O2. Unknown how long mask was off. Daughter said this morning she was conversant but then \"went out\".  From last admission she has a DNR in place but family felt she was \"suffering\" today, O2 sat per family was 35% and they allowed nasal intubation. Per EMS O2 sat was 50%--was not responsive. She received Versed in route and in the ER then placed on Versed drip as she began to move some. Was just discharged home Friday after hospital stay for syncopal type episode. Had been diagnosed with small bowel mass, Heparin bridge for AVR and endoscopy was planned but cancelled per patient desires. She is being admitted to the ICU on vent support for acute hypoxic respiratory failure. Was doing fairly well over the weekend. She is debilitated, slightly mobile with walker and overall debilitated. She fell Monday and EMS was called to the home twice but did not feel she needed to be brought to hospital.  Family reports her meds have been provided but not sure she is taking them. Was to see Dr. Reinier Quijano Monday but she called and cancelled appointment. Chronic Medical:  Systolic/diastolic heart failure, CAD with previous stent, cardiomyopathy with EF 20-25%, AVR with mechanical valve in 2005 and on Coumadin, ICD, HTN, HLD, COPD, pulmonary hypertension, OA, DM, MDS, former tobacco abuse-quit 2014. \"  Past Medical History/Comorbidities:   Ms. Diaz  has a past medical history of Anticoagulated on Coumadin (7/9/2013), CAD (coronary artery disease) (1/20/2013), Cardiomyopathy ([address] CHF (congestive heart failure) ([address] (2/17/2017), CKD (chronic kidney disease) stage 3, GFR 30-59 ml/min ([facility]) (7/10/2013), COPD (chronic obstructive pulmonary disease) ([address] (4/2/2014), Diabetes ([address] Essential hypertension, benign (1/20/2013), HLD (hyperlipidemia), ICD (implantable cardioverter-defibrillator) in place (10/2/2014), Iron deficiency anemia due to chronic blood loss (7/29/2009), MDS (myelodysplastic syndrome) ([address] (12/17/2011), REJI (obstructive sleep apnea)-cpap (4/2/2014), Osteoarthritis, Osteopenia, Pulmonary hypertension ([address] (6/15/2016), Quadrantanopia, Skin defect (11/1/2018), and Visual complaint (12/14/2015).  She also has no past medical history of Difficult intubation, Malignant hyperthermia due to anesthesia, Nausea & vomiting, Pseudocholinesterase deficiency, or Unspecified adverse effect of anesthesia. Ms. Diaz  has a past surgical history that includes cardiac catheterization (); ir bx bone marrow diagnostic (2011); hx aortic valve replacement (, ); hx  section; hx tubal ligation; hx heart catheterization (); hx coronary stent placement (May, 2014); hx pacemaker (10/2/2014); hx endoscopy (2009); and colonoscopy (N/A, 2019). Social History/Living Environment:   Home Environment: Apartment  # Steps to Enter: 4  Rails to Enter: Yes  One/Two Story Residence: One story  Living Alone: Yes  Support Systems: Child(alysha)  Patient Expects to be Discharged to[de-identified] Apartment  Current DME Used/Available at Home: Shower chair, Walker, rollator  Tub or Shower Type: Tub/Shower combination  Prior Level of Function/Work/Activity:  Lives alone, ambulates short distances with rollator independently. CLTC assists with ADL's. Number of Personal Factors/Comorbidities that affect the Plan of Care: 3+: HIGH COMPLEXITY   EXAMINATION:   Most Recent Physical Functioning:   Gross Assessment:                  Posture:     Balance:    Bed Mobility:  Supine to Sit: Minimum assistance  Scooting: Moderate assistance(in bed due to no use of R arm)  Wheelchair Mobility:     Transfers:  Sit to Stand: Contact guard assistance  Stand to Sit: Contact guard assistance  Gait:     Base of Support: Widened  Speed/Winifred: Shuffled; Slow  Step Length: Left shortened;Right shortened  Distance (ft): 80 Feet (ft)(3 seated rests)  Assistive Device: (HHA)  Ambulation - Level of Assistance: Contact guard assistance;Minimal assistance      Body Structures Involved:  1. Lungs Body Functions Affected:  1. Sensory/Pain  2. Respiratory  3. Movement Related Activities and Participation Affected:  1. Mobility  2. Self Care  3.  Domestic Life Number of elements that affect the Plan of Care: 3: MODERATE COMPLEXITY   CLINICAL PRESENTATION:   Presentation: Stable and uncomplicated: LOW COMPLEXITY   CLINICAL DECISION MAKIN[address] [place] [address] AM-PAC 6 Clicks   Basic Mobility Inpatient Short Form  How much difficulty does the patient currently have. .. Unable A Lot A Little None   1. Turning over in bed (including adjusting bedclothes, sheets and blankets)? ? 1   ? 2   ? 3   ? 4   2. Sitting down on and standing up from a chair with arms ( e.g., wheelchair, bedside commode, etc.)   ? 1   ? 2   ? 3   ? 4   3. Moving from lying on back to sitting on the side of the bed?   ? 1   ? 2   ? 3   ? 4   How much help from another person does the patient currently need. .. Total A Lot A Little None   4. Moving to and from a bed to a chair (including a wheelchair)? ? 1   ? 2   ? 3   ? 4   5. Need to walk in hospital room? ? 1   ? 2   ? 3   ? 4   6. Climbing 3-5 steps with a railing? ? 1   ? 2   ? 3   ? 4   © , Trustees of [address] [place] [address], under license to [employer]. All rights reserved      Score:  Initial: 18 Most Recent: X (Date: -- )    Interpretation of Tool:  Represents activities that are increasingly more difficult (i.e. Bed mobility, Transfers, Gait). Medical Necessity:     · Patient is expected to demonstrate progress in functional technique and activity tolerance   ·  to increase independence with   and improve safety during all functional mobility. · .  Reason for Services/Other Comments:  · Patient continues to require skilled intervention due to medical complications and decline in functional mobility. · .   Use of outcome tool(s) and clinical judgement create a POC that gives a: Clear prediction of patient's progress: LOW COMPLEXITY            TREATMENT:   (In addition to Assessment/Re-Assessment sessions the following treatments were rendered)   Pre-treatment Symptoms/Complaints:  none.   Pain: Initial:   Pain Intensity 1: 0  Post Session:  9     Therapeutic Activity: (    45 minutes): Therapeutic activities including Bed transfers, Chair transfers, toilet transfers and Ambulation on level ground to improve mobility, strength, balance and endurance. Required minimal   to promote static and dynamic balance in standing. Braces/Orthotics/Lines/Etc:   · Purewick    · O2 Device: Nasal cannula  Treatment/Session Assessment:    · Response to Treatment:  pleasant and cooperative. · Interdisciplinary Collaboration:   o Physical Therapy Assistant  o Registered Nurse  o Certified Nursing Assistant/Patient Care Technician  · After treatment position/precautions:   o Up in chair  o Bed/Chair-wheels locked  o Bed in low position  o Call light within reach  o RN notified   · Compliance with Program/Exercises: Compliant most of the time  · Recommendations/Intent for next treatment session: \"Next visit will focus on advancements to more challenging activities and reduction in assistance provided\".   Total Treatment Duration:  PT Patient Time In/Time Out  Time In: 1000  Time Out: [address], PTA

## 2019-08-16 NOTE — PROGRESS NOTES
When patient is medically stable [facility] for [facility]. Patient has [address] aides that will resume.

## 2019-08-16 NOTE — PROGRESS NOTES
Warfarin dosing per pharmacist    Letitia Schreiber Stephanie Landers is a 68 y.o. female. Height: 5' 2\" (157.5 cm)    Weight: 94.1 kg (207 lb 8 oz)    Indication:  Mechanical AVR    Goal INR:  2-3    Home dose:  4 mg hs    Risk factors or significant drug interactions:  --    Other anticoagulants:  Heparin bridge     Daily Monitoring  Date  INR     Warfarin dose  HGB              Notes  8/7  2.5  ---   8.7  8/8  2.7  1 mg   8.9  8/9  2.9  Hold   8.1  8/10  2.4  ---   ---  8/11  1.7  1 mg + 1 mg  ---  8/12                 1.7                   1 mg                            7.9   8/13  1.6  3 mg   8.2  8/14  1.6  3 mg   ---  8/15  1.8  3 mg   7.8    8/16  1.7  4 mg   7.6    Pharmacy consulted to manage warfarin for Ms. Corral during this admission. Pt was just recently admitted and discharged on 4 mg hs. INR to 1.7 today. Will give 4 mg tonight and follow. Pt may need alternating schedule. Pharmacy will continue to follow. Please call with any questions.     Thank you,  Michael Diana, PharmD  Clinical Pharmacist  [phone]

## 2019-08-16 NOTE — PROGRESS NOTES
PM assessment done. No changes noted. Respiration even and unlabored 20/min at rest. No s/s of pain noted at present. Worked with PT earlier [place]. Encouraged to call with needs.

## 2019-08-16 NOTE — PROGRESS NOTES
Bedside report received from Estelle Barerto RN. Heparin drip verified. No distress on 3L via NC. Will monitor.

## 2019-08-17 NOTE — PROGRESS NOTES
Ultram 50 mg po given.       08/17/19 0544   Pain 1   Pain Scale 1 Numeric (0 - 10)   Pain Intensity 1 9   Patient Stated Pain Goal 0   Pain Onset 1 chronic   Pain Location 1 Shoulder   Pain Orientation 1 Right   Pain Description 1 Aching   Pain Intervention(s) 1 Medication (see MAR)

## 2019-08-17 NOTE — PROGRESS NOTES
Warfarin dosing per pharmacist    Sweetie Espinal Alexa Holland is a 68 y.o. female. Height: 5' 2\" (157.5 cm)    Weight: (pt. refused; RN notified.)    Indication:  Mechanical AVR    Goal INR:  2-3    Home dose:  4 mg hs    Risk factors or significant drug interactions:  --    Other anticoagulants:  Heparin bridge     Daily Monitoring  Date  INR     Warfarin dose  HGB              Notes  8/7  2.5  ---   8.7  8/8  2.7  1 mg   8.9  8/9  2.9  Hold   8.1  8/10  2.4  ---   ---  8/11  1.7  1 mg + 1 mg  ---  8/12                 1.7                   1 mg                            7.9   8/13  1.6  3 mg   8.2  8/14  1.6  3 mg   ---  8/15  1.8  3 mg   7.8    8/16  1.7  4 mg   7.6  8/17  1.7  4 mg   ---    Pharmacy consulted to manage warfarin for Ms. Corral during this admission. Pt was just recently admitted and discharged on 4 mg hs. INR to 1.7 again today. Will give 4 mg tonight and follow. Pt may need alternating schedule. Pharmacy will continue to follow. Please call with any questions.     Thank you,  Lauro Sharpe, PharmD  Clinical Pharmacist  [phone]

## 2019-08-17 NOTE — PROGRESS NOTES
Ultram effective. Resting quiet with eyes closed with BiPap on. Appears to be sleeping.      08/17/19 0027   Vital Signs   Temp 98 °F (36.7 °C)   Temp Source Oral   Pulse (Heart Rate) 63   Resp Rate 18   O2 Sat (%) 93 %   Level of Consciousness Alert   /63   MAP (Calculated) 78   BP 1 Method Automatic   BP 1 Location Left arm   BP Patient Position At rest   MEWS Score 1   Pain 1   Pain Scale 1 Visual   Pain Intensity 1 0   Pain Reassessment 1 Patient resting w/respiratory rate greater than 10   Patient Observation   Patient Turned Turns self   Activity In bed;Sleeping

## 2019-08-17 NOTE — PROGRESS NOTES
Received bedside shift report from Mtat Mata RN. Pt lying in bed. No apparent distress. Respirations even and unlabored. Instructed to call for assistance with needs, as they arise. Pt voiced understanding.

## 2019-08-17 NOTE — PROGRESS NOTES
Patient called me to room to request to remove her from [facility].  Informed nurse

## 2019-08-17 NOTE — PROGRESS NOTES
Ultram 50 mg po given. Will monitor.      08/16/19 2346   Pain 1   Pain Scale 1 Numeric (0 - 10)   Pain Intensity 1 9   Patient Stated Pain Goal 0   Pain Onset 1 chronic   Pain Location 1 Shoulder   Pain Orientation 1 Right   Pain Description 1 Aching   Pain Intervention(s) 1 Medication (see MAR)

## 2019-08-17 NOTE — PROGRESS NOTES
Desyrel 50 mg po given for c/o insomnia r/t trilogy. States she might be able to wear it if she has her sleeping pill. RRT once again placed BiPap on patient.

## 2019-08-18 NOTE — PROGRESS NOTES
Daughter reports patient is more sleepy than usual  Patient awakens but remains sleepy  Patient says she can't wake up good and says she feels \"spacey. \"  Dr Guzmán Laquey in to see patient  Respiratory in to draw abg

## 2019-08-18 NOTE — PROGRESS NOTES
Ultram effective     08/17/19 2010   Pain 1   Pain Scale 1 Visual   Pain Intensity 1 0   Pain Reassessment 1 Patient resting w/respiratory rate greater than 10

## 2019-08-18 NOTE — PROGRESS NOTES
Ultram 50 mg po given.      08/17/19 2250   Pain 1   Pain Scale 1 Numeric (0 - 10)   Pain Intensity 1 9   Patient Stated Pain Goal 0   Pain Onset 1 chronic   Pain Location 1 Shoulder   Pain Orientation 1 Right   Pain Description 1 Aching   Pain Intervention(s) 1 Medication (see MAR)

## 2019-08-18 NOTE — PROGRESS NOTES
Warfarin dosing per pharmacist    Addy García Luis Carlos Cisneros is a 68 y.o. female. Height: 5' 2\" (157.5 cm)    Weight: (pt. refused; RN notified.)    Indication:  Mechanical AVR    Goal INR:  2-3    Home dose:  4 mg hs    Risk factors or significant drug interactions:  --    Other anticoagulants:  Heparin bridge     Daily Monitoring  Date  INR     Warfarin dose  HGB              Notes  8/7  2.5  ---   8.7  8/8  2.7  1 mg   8.9  8/9  2.9  Hold   8.1  8/10  2.4  ---   ---  8/11  1.7  1 mg + 1 mg  ---  8/12                 1.7                   1 mg                            7.9   8/13  1.6  3 mg   8.2  8/14  1.6  3 mg   ---  8/15  1.8  3 mg   7.8    8/16  1.7  4 mg   7.6  8/17  1.7  4 mg   ---  8/18  2.1  4 mg   7.4    Pharmacy consulted to manage warfarin for Ms. Corral during this admission. Pt was just recently admitted and discharged on 4 mg hs. INR to 2.1 today. Will give 4 mg tonight and follow. Pt may need alternating schedule. Pharmacy will continue to follow. Please call with any questions.     Thank you,  Lisa Huang, PharmD  Clinical Pharmacist  [phone]

## 2019-08-18 NOTE — PROGRESS NOTES
Hospitalist Progress Note    Patient: Oscar Morris MRN: [id number]  SSN: [id number]    YOB: 1950  Age: 68 y.o. Sex: female      Admit Date: 8/7/2019    LOS: 11 days     Subjective:     From previous notes: \"69 y.o.  female with chronic respiratory failure on 3lpm & nocturnal Trilogy who presents with respiratory distress.  Was found by family members this morning with her mask off, \"not looking good\", swollen and EMS was called. Family states she called about 5-6PM last night and reported Trilogy was alarming and was placing herself on O2. Unknown how long mask was off. Daughter said this morning she was conversant but then \"went out\". From last admission she has a DNR in place but family felt she was \"suffering\" today, O2 sat per family was 35% and they allowed nasal intubation.  Per EMS O2 sat was 50%--was not responsive.  She received Versed in route and in the ER then placed on Versed drip as she began to move some.   Was just discharged home Friday after hospital stay for syncopal type episode. Will Moreiraane been diagnosed with small bowel mass, Heparin bridge for AVR and endoscopy was planned but cancelled per patient desires. Tre Colvin is being admitted to the ICU on vent support for acute hypoxic respiratory failure. \"    8/18 - She feels better today. No acute complaints. Denies CP/SOB. Denies F/C/N/V. Review of systems negative except stated above.     Objective:     Visit Vitals  /61   Pulse 62   Temp 97.8 °F (36.6 °C)   Resp 19   Ht 5' 2\" (1.575 m)   Wt 94.1 kg (207 lb 8 oz)   SpO2 97%   BMI 37.95 kg/m²      Oxygen Therapy  O2 Sat (%): 97 % (08/18/19 0748)  Pulse via Oximetry: 57 beats per minute (08/18/19 0748)  O2 Device: Nasal cannula (08/18/19 0748)  O2 Flow Rate (L/min): 3 l/min (08/18/19 0748)  FIO2 (%): 40 % (08/13/19 2132)      Intake and Output:     Intake/Output Summary (Last 24 hours) at 8/18/2019 0957  Last data filed at 8/18/2019 0911  Gross per 24 hour   Intake 458 ml   Output 650 ml   Net -192 ml         Physical Exam:   GENERAL: alert, cooperative, no distress, appears stated age  EYE: conjunctivae/corneas clear. PERRL. THROAT & NECK: normal and no erythema or exudates noted. LUNG: Bibasilar crackles  HEART: regular rate and rhythm, S1S2, no murmur, no JVD  ABDOMEN: soft, non-tender, non-distended. Bowel sounds normal.   EXTREMITIES:  1+ BLE edema, 2+ pedal/radial pulses bilaterally  SKIN: no rash or abnormalities  NEUROLOGIC: A&Ox3. Cranial nerves 2-12 grossly intact. Lab/Data Review:  Recent Results (from the past 24 hour(s))   POC G3    Collection Time: 08/18/19  4:13 AM   Result Value Ref Range    Device: BIPAP      FIO2 (POC) 36 %    pH (POC) 7.389 7.35 - 7.45      pCO2 (POC) 66.5 (HH) 35 - 45 MMHG    pO2 (POC) 79 75 - 100 MMHG    HCO3 (POC) 40.1 (H) 22 - 26 MMOL/L    sO2 (POC) 95 95 - 98 %    Base excess (POC) 13 mmol/L    Tidal volume 490 ml    Set Rate 15 bpm    PEEP/CPAP (POC) 6 cmH2O    Allens test (POC) YES      Site RIGHT RADIAL      Patient temp.  98.6      Specimen type (POC) ARTERIAL      Performed by [email or web address]     CO2, POC 42 MMOL/L    Flow rate (POC) 4.000 L/min    Respiratory comment: NurseNotified     Exhaled minute volume 7.40 L/min    COLLECT TIME 413     PROTHROMBIN TIME + INR    Collection Time: 08/18/19  7:25 AM   Result Value Ref Range    Prothrombin time 24.1 (H) 11.7 - 14.5 sec    INR 2.1     RENAL FUNCTION PANEL    Collection Time: 08/18/19  7:25 AM   Result Value Ref Range    Sodium 140 136 - 145 mmol/L    Potassium 4.3 3.5 - 5.1 mmol/L    Chloride 96 (L) 98 - 107 mmol/L    CO2 39 (H) 21 - 32 mmol/L    Anion gap 5 (L) 7 - 16 mmol/L    Glucose 109 (H) 65 - 100 mg/dL    BUN 56 (H) 8 - 23 MG/DL    Creatinine 1.95 (H) 0.6 - 1.0 MG/DL    GFR est AA 33 (L) >60 ml/min/1.73m2    GFR est non-AA 27 (L) >60 ml/min/1.73m2    Calcium 9.6 8.3 - 10.4 MG/DL    Phosphorus 4.4 (H) 2.3 - 3.7 MG/DL    Albumin 2.9 (L) 3.2 - 4.6 g/dL   HGB & HCT    Collection Time: 08/18/19  7:25 AM   Result Value Ref Range    HGB 7.4 (L) 11.7 - 15.4 g/dL    HCT 24.7 (L) 35.8 - 46.3 %   PTT    Collection Time: 08/18/19  7:25 AM   Result Value Ref Range    aPTT 124.5 (H) 24.7 - 39.8 SEC       Imaging:  Xr Chest Sngl V    Result Date: 8/9/2019  EXAM: XR CHEST SNGL V INDICATION: Pulmonary edema COMPARISON: 8/8/2019 FINDINGS: A portable AP radiograph of the chest was obtained at 0421 hours. The patient is on a cardiac monitor. Left lower lobe lobar atelectasis unchanged. . Cardiomegaly unchanged. .  The bones and soft tissues are grossly within normal limits. IMPRESSION: Left lower lobe lobar atelectasis unchanged. Cardiomegaly unchanged. Xr Chest Sngl V    Result Date: 8/8/2019  EXAM: XR CHEST SNGL V INDICATION: Heart failure COMPARISON: 8/7/2019 FINDINGS: A portable AP radiograph of the chest was obtained at 0423 hours. The patient is on a cardiac monitor. Lobar atelectasis of the left lower lobe. Endotracheal tubes in adequate position. The cardiac and mediastinal contours and pulmonary vascularity are normal.  The bones and soft tissues are grossly within normal limits. IMPRESSION: Lobar atelectasis of the left lower lobe. Xr Chest Sngl V    Result Date: 8/7/2019  CHEST X-RAY, single portable view  8/7/2019 History: Intubated. Technique: Single frontal view of the chest. Comparison: Chest x-ray 7/28/2019 Findings: A stable left-sided intracardiac device is seen. An endotracheal tube is now seen. The tip of this is low in position located at the robert directed towards the left mainstem bronchus. Retraction of this by 3 cm should place the tip at the level of a clavicles. Stable moderate to severe cardiomegaly is seen. Lungs are expanded without appreciable pneumothorax. No evolving consolidation, pleural effusion is seen. IMPRESSION: 1. Low position of endotracheal tube tip located at the robert with the tip directed towards the left mainstem bronchus. Recommend retraction of this by 3 cm and a repeat chest x-ray to confirm position. 2. Stable cardiomegaly. Xr Shoulder Rt Ap/lat Min 2 V    Result Date: 8/7/2019  THREE-VIEW RIGHT SHOULDER, TWO-VIEW RIGHT HUMERUS, TWO-VIEW RIGHT FOREARM: CLINICAL HISTORY:  Right upper extremity pain after fall. COMPARISON:  None. FINDINGS:  No definite fracture, malalignment, or oly bone destruction is evident. Right shoulder and humerus of June 6, 2019. Impaction injury is again noted at the medial aspect of the junction of the humeral head and neck. New calcification projecting over the central and medial space suggests the possibility of calcific tendinitis of the rotator cuff. No persistent radiopaque foreign body is seen. IMPRESSION:  1. No definite acute bony abnormality identified. 2.  Probable chronic impaction at the medial aspect of the junction of the humeral head and neck 3. Probable calcific tendinitis of the rotator cuff. Xr Humerus Rt    Result Date: 8/7/2019  THREE-VIEW RIGHT SHOULDER, TWO-VIEW RIGHT HUMERUS, TWO-VIEW RIGHT FOREARM: CLINICAL HISTORY:  Right upper extremity pain after fall. COMPARISON:  None. FINDINGS:  No definite fracture, malalignment, or oly bone destruction is evident. Right shoulder and humerus of June 6, 2019. Impaction injury is again noted at the medial aspect of the junction of the humeral head and neck. New calcification projecting over the central and medial space suggests the possibility of calcific tendinitis of the rotator cuff. No persistent radiopaque foreign body is seen. IMPRESSION:  1. No definite acute bony abnormality identified. 2.  Probable chronic impaction at the medial aspect of the junction of the humeral head and neck 3. Probable calcific tendinitis of the rotator cuff.      Xr Forearm Rt Ap/lat    Result Date: 8/7/2019  THREE-VIEW RIGHT SHOULDER, TWO-VIEW RIGHT HUMERUS, TWO-VIEW RIGHT FOREARM: CLINICAL HISTORY:  Right upper extremity pain after fall. COMPARISON:  None. FINDINGS:  No definite fracture, malalignment, or oly bone destruction is evident. Right shoulder and humerus of June 6, 2019. Impaction injury is again noted at the medial aspect of the junction of the humeral head and neck. New calcification projecting over the central and medial space suggests the possibility of calcific tendinitis of the rotator cuff. No persistent radiopaque foreign body is seen. IMPRESSION:  1. No definite acute bony abnormality identified. 2.  Probable chronic impaction at the medial aspect of the junction of the humeral head and neck 3. Probable calcific tendinitis of the rotator cuff. Ct Head Wo Cont    Result Date: 7/29/2019  Examination: CT scan of the brain without contrast. History: Confusion/delirium, altered LOC, unexplained, 68 years Female Patient presents with daughter. Daughter states patient is supposed to use a walker to ambulate but does not always. Patient was found this afternoon face up, sideways, on the bed. Daughter states they assume syncopal episode, patient has a history of falls. Patient does not recall incident. Daughter brought patient because she is lethargic, patient was able to eat a small meal PTA. Patient denies pain or injuries. Patient on 3L NC at all times for COPD. Daughter states hx of HTN, took medication just PTA with meal Technique: 5 mm axial imaging of the brain from the posterior fossa to the vertex. Radiation dose reduction techniques were used for this study:  Our CT scanners use one or all of the following: Automated exposure control, adjustment of the mA and/or kVp according to patient's size, iterative reconstruction. Comparison:  CT brain September 07, 2018 Findings: Probable mild microvascular disease unchanged. The ventricles, sulci are age-appropriate. No intracranial hemorrhage or extra-axial collection is identified. No evidence of acute infarct. No mass effect or midline shift is present. Basal cisterns are intact. Small fluid levels are seen in the bilateral maxillary sinuses. Mild mucosal reaction bilateral ethmoid sinuses. The visualized mastoid air cells are clear. The orbits, bones, and soft tissues are normal in appearance. Image quality somewhat degraded by motion artifact. Impression:  No acute intracranial abnormality. Paranasal sinus mucosal disease as above. Xr Chest Port    Result Date: 8/7/2019  CHEST X-RAY, single portable view  8/7/2019 History: Tube repositioning. Technique: Single frontal view of the chest. Comparison: Chest x-ray 8/7/2019 Findings: A stable left-sided intracardiac device is seen. The patient is intubated. The endotracheal tube has been repositioned and is now in good position with the line 3 cm above the robert. Stable cardiomegaly is seen. Lungs are expanded without appreciable pneumothorax. No evolving consolidation, or pleural effusion is seen. IMPRESSION: 1. Good position of endotracheal tube. 2. Stable cardiomegaly. Xr Chest Port    Result Date: 7/28/2019  AP chest radiograph History: syncope, 68 years Female Comparison: Chest radiograph June 07, 2019 Findings:  Cardiac pacing device obscures part of the left chest wall, single lead appears to remain in anatomic position. Normal cardiomediastinal silhouette with evidence of CABG. Persistent mild hyperinflation suggestive of COPD. Nonspecific mild diffuse interstitial prominence appears slightly increased since prior, may represent edema in the appropriate clinical setting. Trace bilateral pleural effusions appear slightly increased since prior. Mild subsegmental atelectasis bilateral lung bases. No evidence of pneumothorax. Visualized soft tissue and osseous structures otherwise unremarkable. Impression:  Suspect worsening mild interstitial edema. No results found for this visit on 08/07/19. Cultures:   All Micro Results     Procedure Component Value Units Date/Time CULTURE, BLOOD [[id number]] Collected:  08/07/19 0953    Order Status:  Completed Specimen:  Blood Updated:  08/12/19 1106     Special Requests: --        RIGHT  Antecubital       Culture result: NO GROWTH 5 DAYS       CULTURE, BLOOD [[id number]] Collected:  08/07/19 1959    Order Status:  Completed Specimen:  Blood Updated:  08/12/19 1106     Special Requests: --        LEFT  HAND       Culture result: NO GROWTH 5 DAYS             Assessment/Plan:     Principal Problem:    Acute on chronic respiratory failure with hypoxia ([address] (8/7/2019)  - Resolved  - Continue Albuterol  - Continue Lasix  - Continue vent machine at night    Active Problems:    Acute exacerbation of CHF (congestive heart failure) ([address] (11/14/2018)  - No acute issues  - Continue Coreg  - Continue Lasix  - Strict I/O      Acute renal failure superimposed on chronic kidney disease ([address] (8/8/2019)  - Creatinine improved  - Continue current Lasix dose  - Repeat BMP in AM      MDS (myelodysplastic syndrome) ([facility] (12/17/2011)      Chronic combined systolic and diastolic heart failure (HCC) (1/20/2013)  - Continue Coreg  - Continue Lasix      CKD (chronic kidney disease) stage 4, GFR 15-29 ml/min ([facility]) (7/10/2013)      COPD (chronic obstructive pulmonary disease) ([address] (4/2/2014)      CAD (coronary artery disease) (1/20/2013)  - No acute CP  - Continue ASA  - Continue Coreg      REJI (obstructive sleep apnea)-cpap (4/2/2014)      S/P AVR (aortic valve replacement) ()  - Continue Warfarin  - INR 2.1 today  - Continue Heparin gtt  - Goal INR 2.5 prior to discharge      Cardiomyopathy ([address] ()      Type 2 diabetes mellitus with nephropathy ([address] (12/18/2017)  - No acute issues      ICD (implantable cardioverter-defibrillator) in place (10/2/2014)      Debility (9/8/2018)      Severe obesity ([address] (1/15/2019)    Dispo - Continue current meds. Continue Heparin gtt. Discharge when INR >2.5.     DIET REGULAR    DVT Prophylaxis: Warfarin      Signed By: Parish Jurado Jermaine Rodriguez,      August 18, 2019

## 2019-08-19 NOTE — PROGRESS NOTES
Received bedside shift report from Mtat Prajapati RN. Pt lying in bed. Alert x3. No apparent distress. Respirations even and unlabored. Instructed to call for assistance with needs, as they arise. Pt voiced understanding.

## 2019-08-19 NOTE — PROGRESS NOTES
Spoke with Gordon Fritz in lab to follow up on PTT results. Lab delayed due to staffing per Gordon Fritz and states will sent someone to collect as soon as possible. Will follow.

## 2019-08-19 NOTE — PROGRESS NOTES
Spoke with Shanell Roe in lab who reported PTT of 182. 3. Read back and confirmed. Last PTT was 68.8. STAT repeat PTT ordered. Vivian in lab aware to send someone to recollect. Spoke with Dr. Oli Davis and made aware of above. No new orders at present. Awaiting lab to come and draw PTT.

## 2019-08-19 NOTE — PROGRESS NOTES
Hospitalist Progress Note    Patient: Andres Khan MRN: [id number]  SSN: [id number]    YOB: 1950  Age: 68 y.o. Sex: female      Admit Date: 8/7/2019    LOS: 12 days     Subjective:     From previous notes: \"69 y.o.  female with chronic respiratory failure on 3lpm & nocturnal Trilogy who presents with respiratory distress.  Was found by family members this morning with her mask off, \"not looking good\", swollen and EMS was called. Family states she called about 5-6PM last night and reported Trilogy was alarming and was placing herself on O2. Unknown how long mask was off. Daughter said this morning she was conversant but then \"went out\". From last admission she has a DNR in place but family felt she was \"suffering\" today, O2 sat per family was 35% and they allowed nasal intubation.  Per EMS O2 sat was 50%--was not responsive.  She received Versed in route and in the ER then placed on Versed drip as she began to move some.   Was just discharged home Friday after hospital stay for syncopal type episode. Lena Melara been diagnosed with small bowel mass, Heparin bridge for AVR and endoscopy was planned but cancelled per patient desires. Venus Craig is being admitted to the ICU on vent support for acute hypoxic respiratory failure. \"    8/19 - She feels a little sleepy today. No acute complaints. Denies CP/SOB. Denies F/C/N/V. Had to be placed on Trilogy during the day yesterday due to rising pCO2. Recommended to wear it when she falls asleep at any time. Review of systems negative except stated above.     Objective:     Visit Vitals  /72   Pulse 65   Temp 98.1 °F (36.7 °C)   Resp 19   Ht 5' 2\" (1.575 m)   Wt 92.5 kg (204 lb)   SpO2 93%   BMI 37.31 kg/m²      Oxygen Therapy  O2 Sat (%): 93 % (08/19/19 1122)  Pulse via Oximetry: 84 beats per minute (08/19/19 0814)  O2 Device: Nasal cannula (08/19/19 0814)  O2 Flow Rate (L/min): 3 l/min (08/19/19 0814)  FIO2 (%): 35 % (08/19/19 1156)      Intake and Output:     Intake/Output Summary (Last 24 hours) at 8/19/2019 1157  Last data filed at 8/19/2019 0738  Gross per 24 hour   Intake 1076 ml   Output 1800 ml   Net -724 ml         Physical Exam:   GENERAL: alert, cooperative, no distress, appears stated age  EYE: conjunctivae/corneas clear. PERRL. THROAT & NECK: normal and no erythema or exudates noted. LUNG: Bibasilar crackles  HEART: regular rate and rhythm, S1S2, no murmur, no JVD  ABDOMEN: soft, non-tender, non-distended. Bowel sounds normal.   EXTREMITIES:  1+ BLE edema, 2+ pedal/radial pulses bilaterally  SKIN: no rash or abnormalities  NEUROLOGIC: A&Ox3. Cranial nerves 2-12 grossly intact. Lab/Data Review:  Recent Results (from the past 24 hour(s))   POC VENOUS BLOOD GAS    Collection Time: 08/18/19  3:17 PM   Result Value Ref Range    Device: NASAL CANNULA      pH, venous (POC) 7.316 (L) 7.32 - 7.42      pCO2, venous (POC) 79.6 (H) 41 - 51 MMHG    pO2, venous (POC) 22 (LL) mmHg    HCO3, venous (POC) 40.6 (H) 23 - 28 MMOL/L    sO2, venous (POC) 29 (L) 65 - 88 %    Allens test (POC) YES      Site LEFT BRACHIAL      Patient temp.  98.6      Specimen type (POC) VENOUS BLOOD      Performed by Brian     CO2, POC 43 MMOL/L    Flow rate (POC) 3.000 L/min    Respiratory comment: NurseNotified     COLLECT TIME 1,515     PTT, CRRT PROTOCOL (PTT DRIP)    Collection Time: 08/18/19  4:01 PM   Result Value Ref Range    PTT, CRRT PROTOCOL 79.6 (H) 24.7 - 39.8 SEC   PTT    Collection Time: 08/18/19 11:01 PM   Result Value Ref Range    aPTT 68.8 (H) 24.7 - 39.8 SEC   RENAL FUNCTION PANEL    Collection Time: 08/19/19  9:02 AM   Result Value Ref Range    Sodium 140 136 - 145 mmol/L    Potassium 4.2 3.5 - 5.1 mmol/L    Chloride 97 (L) 98 - 107 mmol/L    CO2 38 (H) 21 - 32 mmol/L    Anion gap 5 (L) 7 - 16 mmol/L    Glucose 112 (H) 65 - 100 mg/dL    BUN 50 (H) 8 - 23 MG/DL    Creatinine 1.86 (H) 0.6 - 1.0 MG/DL    GFR est AA 34 (L) >60 ml/min/1.73m2    GFR est non-AA 28 (L) >60 ml/min/1.73m2    Calcium 10.0 8.3 - 10.4 MG/DL    Phosphorus 3.8 (H) 2.3 - 3.7 MG/DL    Albumin 3.0 (L) 3.2 - 4.6 g/dL   HGB & HCT    Collection Time: 08/19/19  9:02 AM   Result Value Ref Range    HGB 7.7 (L) 11.7 - 15.4 g/dL    HCT 25.9 (L) 35.8 - 46.3 %   POC VENOUS BLOOD GAS    Collection Time: 08/19/19 11:16 AM   Result Value Ref Range    Device: NASAL CANNULA      pH, venous (POC) 7.407 7.32 - 7.42      pCO2, venous (POC) 63.1 (H) 41 - 51 MMHG    pO2, venous (POC) 42 mmHg    HCO3, venous (POC) 39.7 (H) 23 - 28 MMOL/L    sO2, venous (POC) 75 65 - 88 %    Allens test (POC) YES      Site RIGHT RADIAL      Patient temp. 98.6      Specimen type (POC) MIXED VENOUS      Performed by Yana     CO2, POC 42 MMOL/L    Flow rate (POC) 3.000 L/min    Respiratory comment: NurseNotified     COLLECT TIME 1,052         Imaging:  Xr Chest Sngl V    Result Date: 8/9/2019  EXAM: XR CHEST SNGL V INDICATION: Pulmonary edema COMPARISON: 8/8/2019 FINDINGS: A portable AP radiograph of the chest was obtained at 0421 hours. The patient is on a cardiac monitor. Left lower lobe lobar atelectasis unchanged. . Cardiomegaly unchanged. .  The bones and soft tissues are grossly within normal limits. IMPRESSION: Left lower lobe lobar atelectasis unchanged. Cardiomegaly unchanged. Xr Chest Sngl V    Result Date: 8/8/2019  EXAM: XR CHEST SNGL V INDICATION: Heart failure COMPARISON: 8/7/2019 FINDINGS: A portable AP radiograph of the chest was obtained at 0423 hours. The patient is on a cardiac monitor. Lobar atelectasis of the left lower lobe. Endotracheal tubes in adequate position. The cardiac and mediastinal contours and pulmonary vascularity are normal.  The bones and soft tissues are grossly within normal limits. IMPRESSION: Lobar atelectasis of the left lower lobe. Xr Chest Sngl V    Result Date: 8/7/2019  CHEST X-RAY, single portable view  8/7/2019 History: Intubated. Technique: Single frontal view of the chest. Comparison: Chest x-ray 7/28/2019 Findings: A stable left-sided intracardiac device is seen. An endotracheal tube is now seen. The tip of this is low in position located at the robert directed towards the left mainstem bronchus. Retraction of this by 3 cm should place the tip at the level of a clavicles. Stable moderate to severe cardiomegaly is seen. Lungs are expanded without appreciable pneumothorax. No evolving consolidation, pleural effusion is seen. IMPRESSION: 1. Low position of endotracheal tube tip located at the robert with the tip directed towards the left mainstem bronchus. Recommend retraction of this by 3 cm and a repeat chest x-ray to confirm position. 2. Stable cardiomegaly. Xr Shoulder Rt Ap/lat Min 2 V    Result Date: 8/7/2019  THREE-VIEW RIGHT SHOULDER, TWO-VIEW RIGHT HUMERUS, TWO-VIEW RIGHT FOREARM: CLINICAL HISTORY:  Right upper extremity pain after fall. COMPARISON:  None. FINDINGS:  No definite fracture, malalignment, or oly bone destruction is evident. Right shoulder and humerus of June 6, 2019. Impaction injury is again noted at the medial aspect of the junction of the humeral head and neck. New calcification projecting over the central and medial space suggests the possibility of calcific tendinitis of the rotator cuff. No persistent radiopaque foreign body is seen. IMPRESSION:  1. No definite acute bony abnormality identified. 2.  Probable chronic impaction at the medial aspect of the junction of the humeral head and neck 3. Probable calcific tendinitis of the rotator cuff. Xr Humerus Rt    Result Date: 8/7/2019  THREE-VIEW RIGHT SHOULDER, TWO-VIEW RIGHT HUMERUS, TWO-VIEW RIGHT FOREARM: CLINICAL HISTORY:  Right upper extremity pain after fall. COMPARISON:  None. FINDINGS:  No definite fracture, malalignment, or oly bone destruction is evident. Right shoulder and humerus of June 6, 2019.   Impaction injury is again noted at the medial aspect of the junction of the humeral head and neck. New calcification projecting over the central and medial space suggests the possibility of calcific tendinitis of the rotator cuff. No persistent radiopaque foreign body is seen. IMPRESSION:  1. No definite acute bony abnormality identified. 2.  Probable chronic impaction at the medial aspect of the junction of the humeral head and neck 3. Probable calcific tendinitis of the rotator cuff. Xr Forearm Rt Ap/lat    Result Date: 8/7/2019  THREE-VIEW RIGHT SHOULDER, TWO-VIEW RIGHT HUMERUS, TWO-VIEW RIGHT FOREARM: CLINICAL HISTORY:  Right upper extremity pain after fall. COMPARISON:  None. FINDINGS:  No definite fracture, malalignment, or oly bone destruction is evident. Right shoulder and humerus of June 6, 2019. Impaction injury is again noted at the medial aspect of the junction of the humeral head and neck. New calcification projecting over the central and medial space suggests the possibility of calcific tendinitis of the rotator cuff. No persistent radiopaque foreign body is seen. IMPRESSION:  1. No definite acute bony abnormality identified. 2.  Probable chronic impaction at the medial aspect of the junction of the humeral head and neck 3. Probable calcific tendinitis of the rotator cuff. Ct Head Wo Cont    Result Date: 7/29/2019  Examination: CT scan of the brain without contrast. History: Confusion/delirium, altered LOC, unexplained, 68 years Female Patient presents with daughter. Daughter states patient is supposed to use a walker to ambulate but does not always. Patient was found this afternoon face up, sideways, on the bed. Daughter states they assume syncopal episode, patient has a history of falls. Patient does not recall incident. Daughter brought patient because she is lethargic, patient was able to eat a small meal PTA. Patient denies pain or injuries. Patient on 3L NC at all times for COPD.  Daughter states hx of HTN, took medication just PTA with meal Technique: 5 mm axial imaging of the brain from the posterior fossa to the vertex. Radiation dose reduction techniques were used for this study:  Our CT scanners use one or all of the following: Automated exposure control, adjustment of the mA and/or kVp according to patient's size, iterative reconstruction. Comparison:  CT brain September 07, 2018 Findings: Probable mild microvascular disease unchanged. The ventricles, sulci are age-appropriate. No intracranial hemorrhage or extra-axial collection is identified. No evidence of acute infarct. No mass effect or midline shift is present. Basal cisterns are intact. Small fluid levels are seen in the bilateral maxillary sinuses. Mild mucosal reaction bilateral ethmoid sinuses. The visualized mastoid air cells are clear. The orbits, bones, and soft tissues are normal in appearance. Image quality somewhat degraded by motion artifact. Impression:  No acute intracranial abnormality. Paranasal sinus mucosal disease as above. Xr Chest Port    Result Date: 8/7/2019  CHEST X-RAY, single portable view  8/7/2019 History: Tube repositioning. Technique: Single frontal view of the chest. Comparison: Chest x-ray 8/7/2019 Findings: A stable left-sided intracardiac device is seen. The patient is intubated. The endotracheal tube has been repositioned and is now in good position with the line 3 cm above the robert. Stable cardiomegaly is seen. Lungs are expanded without appreciable pneumothorax. No evolving consolidation, or pleural effusion is seen. IMPRESSION: 1. Good position of endotracheal tube. 2. Stable cardiomegaly. Xr Chest Port    Result Date: 7/28/2019  AP chest radiograph History: syncope, 68 years Female Comparison: Chest radiograph June 07, 2019 Findings:  Cardiac pacing device obscures part of the left chest wall, single lead appears to remain in anatomic position.    Normal cardiomediastinal silhouette with evidence of CABG. Persistent mild hyperinflation suggestive of COPD. Nonspecific mild diffuse interstitial prominence appears slightly increased since prior, may represent edema in the appropriate clinical setting. Trace bilateral pleural effusions appear slightly increased since prior. Mild subsegmental atelectasis bilateral lung bases. No evidence of pneumothorax. Visualized soft tissue and osseous structures otherwise unremarkable. Impression:  Suspect worsening mild interstitial edema. No results found for this visit on 08/07/19. Cultures:   All Micro Results     Procedure Component Value Units Date/Time    CULTURE, BLOOD [[id number]] Collected:  08/07/19 0953    Order Status:  Completed Specimen:  Blood Updated:  08/12/19 1106     Special Requests: --        RIGHT  Antecubital       Culture result: NO GROWTH 5 DAYS       CULTURE, BLOOD [[id number]] Collected:  08/07/19 1959    Order Status:  Completed Specimen:  Blood Updated:  08/12/19 1106     Special Requests: --        LEFT  HAND       Culture result: NO GROWTH 5 DAYS             Assessment/Plan:     Principal Problem:    Acute on chronic respiratory failure with hypoxia ([address] (8/7/2019)  - Resolved  - Continue Albuterol  - Continue Lasix  - Continue vent machine any time she falls asleep    Active Problems:    Acute exacerbation of CHF (congestive heart failure) ([address] (11/14/2018)  - No acute issues  - Continue Coreg  - Continue Lasix  - Strict I/O      Acute renal failure superimposed on chronic kidney disease ([address] (8/8/2019)  - Creatinine improved  - Continue current Lasix dose  - Repeat BMP in AM      MDS (myelodysplastic syndrome) ([address] (12/17/2011)      Chronic combined systolic and diastolic heart failure (HCC) (1/20/2013)  - Continue Coreg  - Continue Lasix      CKD (chronic kidney disease) stage 4, GFR 15-29 ml/min (HCC) (7/10/2013)      COPD (chronic obstructive pulmonary disease) ([address] (4/2/2014)      CAD (coronary artery disease) (1/20/2013)  - No acute CP  - Continue ASA  - Continue Coreg      REJI (obstructive sleep apnea)-cpap (4/2/2014)      S/P AVR (aortic valve replacement) ()  - Continue Warfarin  - INR 2.2 today  - Continue Heparin gtt  - Goal INR 2.5 prior to discharge      Cardiomyopathy [facility]) ()      Type 2 diabetes mellitus with nephropathy ([facility] (12/18/2017)  - No acute issues      ICD (implantable cardioverter-defibrillator) in place (10/2/2014)      Debility (9/8/2018)      Severe obesity ([facility] (1/15/2019)    Dispo - Continue current meds. Continue Heparin gtt. Discharge when INR >2.5. Discharge home in next 1-2 days.     DIET REGULAR    DVT Prophylaxis: Warfarin      Signed By: Jenn Quintero DO     August 19, 2019

## 2019-08-19 NOTE — PROGRESS NOTES
Warfarin dosing per pharmacist    Jeff Cameron Wanda Case is a 68 y.o. female. Height: 5' 2\" (157.5 cm)    Weight: 92.5 kg (204 lb)    Indication:  Mechanical AVR    Goal INR:  2-3    Home dose:  4 mg hs    Risk factors or significant drug interactions:  --    Other anticoagulants:  Heparin bridge     Daily Monitoring  Date  INR     Warfarin dose  HGB              Notes  8/7  2.5  ---   8.7  8/8  2.7  1 mg   8.9  8/9  2.9  Hold   8.1  8/10  2.4  ---   ---  8/11  1.7  1 mg + 1 mg  ---  8/12                 1.7                   1 mg                            7.9   8/13  1.6  3 mg   8.2  8/14  1.6  3 mg   ---  8/15  1.8  3 mg   7.8    8/16  1.7  4 mg   7.6  8/17  1.7  4 mg   ---  8/18  2.1  4 mg   7.4  8/19  2.2  4 mg   7.7    Pharmacy consulted to manage warfarin for Ms. Corral during this admission. Pt was just recently admitted and discharged on 4 mg hs. INR to 2.2 today. Will give 4 mg tonight and follow. Pt may need alternating schedule. Pharmacy will continue to follow. Please call with any questions.     Thank you,  Jagdish Rosas, PharmD  Clinical Pharmacist  [phone]

## 2019-08-19 NOTE — INTERDISCIPLINARY ROUNDS
Interdisciplinary team rounds were held 8/19/2019 with the following team members:Care Management, Physical Therapy, Physician and Clinical Coordinator and the patient. Plan of care discussed. See clinical pathway and/or care plan for interventions and desired outcomes.

## 2019-08-19 NOTE — PROGRESS NOTES
Resting quietly at present. NAD noted. Family at bedside assisting with dinner tray. To report off to on coming nurse.

## 2019-08-19 NOTE — PROGRESS NOTES
Warfarin dosing per pharmacist    Dulce Maria Mendez Melva Mckay is a 68 y.o. female. Height: 5' 2\" (157.5 cm)    Weight: 92.5 kg (204 lb)    Indication:  Mechanical AVR    Goal INR:  2.5-3.5    Home dose:  4 mg hs    Risk factors or significant drug interactions:  --    Other anticoagulants:  Heparin bridge     Daily Monitoring  Date  INR     Warfarin dose  HGB              Notes  8/7  2.5  ---   8.7  8/8  2.7  1 mg   8.9  8/9  2.9  Hold   8.1  8/10  2.4  ---   ---  8/11  1.7  1 mg + 1 mg  ---  8/12                 1.7                   1 mg                            7.9   8/13  1.6  3 mg   8.2  8/14  1.6  3 mg   ---  8/15  1.8  3 mg   7.8    8/16  1.7  4 mg   7.6  8/17  1.7  4 mg   ---  8/18  2.1  4 mg   7.4  8/19  2.2  4 mg   7.7    Pharmacy consulted to manage warfarin for Ms. Corral during this admission. Pt was just recently admitted and discharged on 4 mg hs. INR to 2.2 today. Will give 4 mg tonight and follow. Pt may need alternating schedule. Per cardiology, okay to DC heparin drip when INR >2.    Pharmacy will continue to follow. Please call with any questions.     Thank you,  Lizy Medellin, PharmD  Clinical Pharmacist  [phone]

## 2019-08-19 NOTE — PROGRESS NOTES
Bedside report received from night nurse Rashid Johnson. Assessment done as noted  Respiration even and unlabored 20/min; denies pain or nausea at present. BiPAP intact and patent. NAD noted at present. Will continue to monitor.

## 2019-08-20 NOTE — PROGRESS NOTES
Warfarin dosing per pharmacist    Elmira Jara Sandra Borja is a 68 y.o. female. Height: 5' 2\" (157.5 cm)    Weight: 90.3 kg (199 lb 1.2 oz)    Indication:  Mechanical AVR    Goal INR:  2.5-3.5    Home dose:  4 mg hs    Risk factors or significant drug interactions:  --    Other anticoagulants:  Heparin bridge     Daily Monitoring  Date  INR     Warfarin dose  HGB              Notes  8/7  2.5  ---   8.7  8/8  2.7  1 mg   8.9  8/9  2.9  Hold   8.1  8/10  2.4  ---   ---  8/11  1.7  1 mg + 1 mg  ---  8/12                 1.7                   1 mg                            7.9   8/13  1.6  3 mg   8.2  8/14  1.6  3 mg   ---  8/15  1.8  3 mg   7.8    8/16  1.7  4 mg   7.6  8/17  1.7  4 mg   ---  8/18  2.1  4 mg   7.4  8/19  2.2  4 mg   7.7  8/20  2.1  2 mg + 4 mg  7.5    Pharmacy consulted to manage warfarin for Ms. Corral during this admission. Pt was just recently admitted and discharged on 4 mg hs. Per cardiology, okay to DC heparin drip when INR >2. INR 2.1 this morning. Will give extra 2 mg dose this morning and continue 4 mg this evening. Following daily INR. Pharmacy will continue to follow. Thank you,  Magdi Interiano, Pharm. D.   Clinical Pharmacist  [phone]

## 2019-08-20 NOTE — PROGRESS NOTES
Blood now ready in blood bank. Phone consent for blood transfusion per daughter Radha Chua. Denies needs at present. Will continue to monitor.

## 2019-08-20 NOTE — PROGRESS NOTES
Tolerating unit of blood transfusion. no transfusion reaction noted. See transfusion flow sheet for details. Verbal bedside report given to oncoming nurse Fernanda Grant. Patient's situation, background, assessment and recommendations provided. Opportunity for questions provided. No s/s of pain noted. No distress noted. Oncoming RN assumed care of patient.

## 2019-08-20 NOTE — PROGRESS NOTES
Hospitalist Note     Admit Date:  2019  9:57 AM   Name:  Che Keys   Age:  68 y.o.  :  1950   MRN:  [id number]   PCP:  Venus Ferrell MD  Treatment Team: Attending Provider: Dasha Guerin DO; Utilization Review: Jordyn Jaramillo; Consulting Provider: Blanca Garcia MD; Care Manager: Gabbi Hyde.; Occupational Therapy Assistant: Justus Walker; Primary Nurse: Mal Barker RN; Physical Therapy Assistant: Clinton Barksdale PTA    HPI/Subjective:       Ms. Jimmie Kearney is a 69 yo female with PMH of chronic hypoxia on 3 L NC/ hypercarbic respiratory failure on trilogy, admitted with acute on chronic respiratory failure. She was nasally intubated in the field and then had ET intubation upon admit. She has been managed on IV heparin bridge to her coumadin due to prior AV replacement. INR goal 2.5 to 3.5.  EF 25%, LVDD2    She has been complaint with nocturnal trilogy while admitted post intubation and is weaned to 3 L NC.      Plans are for home with palliative care upon discharge         19 feels dizzy when ambulates, less short of breath, ate ok, had BM, no chest pain or edema             Objective:     Patient Vitals for the past 24 hrs:   Temp Pulse Resp BP SpO2   19 0800 98.2 °F (36.8 °C) 73 18 123/76 100 %   19 0712     97 %   19 0335 98.7 °F (37.1 °C) 77 18 115/82 95 %   19 0038 98.6 °F (37 °C) (!) 58 18 117/82 97 %   19 2215     99 %   19 2000 98.4 °F (36.9 °C) 72 18 113/66 94 %   19 1457 98.1 °F (36.7 °C) 61 18 119/75 93 %   19 1426     94 %   19 1122 98.1 °F (36.7 °C) 65  119/72 93 %     Oxygen Therapy  O2 Sat (%): 100 % (19 0800)  Pulse via Oximetry: 75 beats per minute (08/20/19 0712)  O2 Device: Nasal cannula (19)  O2 Flow Rate (L/min): 3 l/min (19)  FIO2 (%): 35 % (19)  Estimated body mass index is 36.41 kg/m² as calculated from the following: Height as of this encounter: 5' 2\" (1.575 m). Weight as of this encounter: 90.3 kg (199 lb 1.2 oz). Intake/Output Summary (Last 24 hours) at 8/20/2019 1048  Last data filed at 8/20/2019 0915  Gross per 24 hour   Intake 720 ml   Output 3101 ml   Net -2381 ml       *Note that automatically entered I/Os may not be accurate; dependent on patient compliance with collection and accurate  by techs. General:    Well nourished. Alert. Obese, no distress     CV:   RRR. Midsystolic click, trace edema  Lungs:   CTAB. No wheezing, rhonchi, or rales. Abdomen:   Soft, nontender, nondistended. obese  Extremities: Warm and dry  Skin:     No rashes or jaundice. Neuro:  No gross focal deficits    Data Review:  I have reviewed all labs, meds, and studies from the last 24 hours:    Recent Results (from the past 24 hour(s))   POC VENOUS BLOOD GAS    Collection Time: 08/19/19 11:16 AM   Result Value Ref Range    Device: NASAL CANNULA      pH, venous (POC) 7.407 7.32 - 7.42      pCO2, venous (POC) 63.1 (H) 41 - 51 MMHG    pO2, venous (POC) 42 mmHg    HCO3, venous (POC) 39.7 (H) 23 - 28 MMOL/L    sO2, venous (POC) 75 65 - 88 %    Allens test (POC) YES      Site RIGHT RADIAL      Patient temp.  98.6      Specimen type (POC) MIXED VENOUS      Performed by Yana     CO2, POC 42 MMOL/L    Flow rate (POC) 3.000 L/min    Respiratory comment: NurseNotified     COLLECT TIME 1,052     PROTHROMBIN TIME + INR    Collection Time: 08/20/19  7:48 AM   Result Value Ref Range    Prothrombin time 24.7 (H) 11.7 - 14.5 sec    INR 2.1     RENAL FUNCTION PANEL    Collection Time: 08/20/19  7:48 AM   Result Value Ref Range    Sodium 142 136 - 145 mmol/L    Potassium 4.1 3.5 - 5.1 mmol/L    Chloride 99 98 - 107 mmol/L    CO2 40 (H) 21 - 32 mmol/L    Anion gap 3 (L) 7 - 16 mmol/L    Glucose 95 65 - 100 mg/dL    BUN 51 (H) 8 - 23 MG/DL    Creatinine 2.08 (H) 0.6 - 1.0 MG/DL    GFR est AA 30 (L) >60 ml/min/1.73m2    GFR est non-AA 25 (L) >60 ml/min/1.73m2    Calcium 9.7 8.3 - 10.4 MG/DL    Phosphorus 3.5 2.3 - 3.7 MG/DL    Albumin 3.0 (L) 3.2 - 4.6 g/dL   HGB & HCT    Collection Time: 08/20/19  7:48 AM   Result Value Ref Range    HGB 7.5 (L) 11.7 - 15.4 g/dL    HCT 24.6 (L) 35.8 - 46.3 %        All Micro Results     Procedure Component Value Units Date/Time    CULTURE, BLOOD [[id number]] Collected:  08/07/19 0953    Order Status:  Completed Specimen:  Blood Updated:  08/12/19 1106     Special Requests: --        RIGHT  Antecubital       Culture result: NO GROWTH 5 DAYS       CULTURE, BLOOD [[id number]] Collected:  08/07/19 1959    Order Status:  Completed Specimen:  Blood Updated:  08/12/19 1106     Special Requests: --        LEFT  HAND       Culture result: NO GROWTH 5 DAYS             No results found for this visit on 08/07/19.     Current Meds:  Current Facility-Administered Medications   Medication Dose Route Frequency    albuterol (PROVENTIL VENTOLIN) nebulizer solution 2.5 mg  2.5 mg Nebulization Q6H RT    warfarin (COUMADIN) tablet 4 mg  4 mg Oral QPM    nystatin (MYCOSTATIN) 100,000 unit/gram powder   Topical BID    furosemide (LASIX) tablet 80 mg  80 mg Oral DAILY    lidocaine 4 % patch 1 Patch  1 Patch TransDERmal Q24H    guaiFENesin ER (MUCINEX) tablet 1,200 mg  1,200 mg Oral Q12H    traMADol (ULTRAM) tablet 50 mg  50 mg Oral Q6H PRN    carvedilol (COREG) tablet 12.5 mg  12.5 mg Oral BID WITH MEALS    sodium chloride (NS) flush 5-40 mL  5-40 mL IntraVENous Q8H    sodium chloride (NS) flush 5-40 mL  5-40 mL IntraVENous PRN    LORazepam (ATIVAN) injection 2 mg  2 mg IntraVENous Q4H PRN    aspirin delayed-release tablet 81 mg  81 mg Oral DAILY    docusate sodium (COLACE) capsule 100 mg  100 mg Oral DAILY    pantoprazole (PROTONIX) tablet 40 mg  40 mg Oral DAILY    sertraline (ZOLOFT) tablet 50 mg  50 mg Oral DAILY    traZODone (DESYREL) tablet 50 mg  50 mg Oral QHS PRN    ondansetron (ZOFRAN) injection 4 mg  4 mg IntraVENous Q4H PRN       Other Studies (last 24 hours):  No results found.     Assessment and Plan:     Hospital Problems as of 8/20/2019 Date Reviewed: 8/9/2019          Codes Class Noted - Resolved POA    Acute renal failure superimposed on chronic kidney disease ([facility] ICD-10-CM: N17.9, N18.9  ICD-9-CM: 584.9, 585.9  8/8/2019 - Present No        * (Principal) Acute on chronic respiratory failure with hypoxia ([facility] ICD-10-CM: J96.21  ICD-9-CM: 518.84, 799.02  8/7/2019 - Present Yes        Severe obesity ([facility] ICD-10-CM: E66.01  ICD-9-CM: 278.01  1/15/2019 - Present Yes        Acute exacerbation of CHF (congestive heart failure) ([facility] ICD-10-CM: I50.9  ICD-9-CM: 428.0  11/14/2018 - Present Yes        Debility ICD-10-CM: R53.81  ICD-9-CM: 799.3  9/8/2018 - Present Yes        Chronic respiratory failure [facility] hypoxia ([facility] ICD-10-CM: J96.11  ICD-9-CM: 518.83, 799.02  9/7/2018 - Present         Type 2 diabetes mellitus with nephropathy (HCC) (Chronic) ICD-10-CM: E11.21  ICD-9-CM: 250.40, 583.81  12/18/2017 - Present Yes        S/P AVR (aortic valve replacement) (Chronic) ICD-10-CM: Z95.2  ICD-9-CM: V43.3  Unknown - Present Yes    Overview Signed 2/23/2016 11:04 AM by Tamika Magana     Mechanical/Artific             Cardiomyopathy ([address] [facility] (Chronic) ICD-10-CM: I42.9  ICD-9-CM: 425.4  Unknown - Present Yes        ICD (implantable cardioverter-defibrillator) in place ICD-10-CM: Z95.810  ICD-9-CM: V45.02  10/2/2014 - Present Yes    Overview Signed 10/2/2014  4:58 PM by Iain Jackson single-chamber ICD implantation 10/20/14             COPD (chronic obstructive pulmonary disease) ([facility] (Chronic) ICD-10-CM: J44.9  ICD-9-CM: 419  4/2/2014 - Present Yes    Overview Signed 10/6/2018  8:56 PM by Juana Taylor NP     HOME OXYGEN 3 LITERS             REJI (obstructive sleep apnea)-cpap (Chronic) ICD-10-CM: G47.33  ICD-9-CM: 327.23  4/2/2014 - Present Yes    Overview Signed 8/7/2019 12:50 PM by Abdulkadir Fernandes Sarita Acuña NP     Home Trilogy             CKD (chronic kidney disease) stage 4, GFR 15-29 ml/min (HCC) ICD-10-CM: N18.4  ICD-9-CM: 585.4  7/10/2013 - Present Yes        CAD (coronary artery disease) (Chronic) ICD-10-CM: I25.10  ICD-9-CM: 414.00  1/20/2013 - Present Yes    Overview Signed 5/20/2014 10:05 AM by Denny Hernandez NP     5/8/14 PCI LAD with stent placed             Chronic combined systolic and diastolic heart failure (HCC) (Chronic) ICD-10-CM: I50.42  ICD-9-CM: 428.42  1/20/2013 - Present Yes    Overview Addendum 4/28/2018  4:53 AM by Francois Orozco MD     5/8/14 ECHO:  EF 10-15%  12/2017:  EF 25-30%             MDS (myelodysplastic syndrome) ([facility] (Chronic) ICD-10-CM: D46.9  ICD-9-CM: 238.75  12/17/2011 - Present Yes    Overview Addendum 8/29/2013 11:06 AM by Teodoro Mack started in August, 2011 12/18/11 Procrit weekly and Iron stores. 5-12 12-13-12 good response to 3 weekly procrit did not need it last time    3-7-13 Pt doing well. Just wanted a \"check-up. \" Responding to Procrit every three weeks.   8-29-13 patient has missed some injections on recently restarted hemoglobin only issue , takes oral iron iron stores each time                      Plan:  · Acute on chronic combined hypoxic/ hypercarbic respiratory failure/ combined CHF: weaned to 3L NC, encourage nocturnal trilogy, continue lasix/coreg  · MDS/Anemia: currently feels symptomatic due to dizziness, will transfuse 1 unit with lasix 40 mg IV once after martinez  · AV replacement: on coumadin with daily INR monitoring, pharmacy consulted , per their notes cardiology ok with INR >2.0  · FELIZ on CKD: overall stable creatinine trends   · DM2: no coverage    DC planning/Dispo:  Pending to home      Diet:  DIET REGULAR  DVT ppx:  INR 2.1 on coumadin    Signed:  Hilary Cortez MD

## 2019-08-20 NOTE — INTERDISCIPLINARY ROUNDS
Interdisciplinary team rounds were held 8/20/2019 with the following team members:Care Management, Physical Therapy, Physician and Clinical Coordinator and the patient. Plan of care discussed. See clinical pathway and/or care plan for interventions and desired outcomes.

## 2019-08-20 NOTE — PROGRESS NOTES
Bedside report received from night nurse Derick Claudio. Assessment done as noted  Respiration even and unlabored 20/min; denies pain or nausea at present. Encouraged to call with needs.

## 2019-08-20 NOTE — PROGRESS NOTES
Problem: Mobility Impaired (Adult and Pediatric)  Goal: *Acute Goals and Plan of Care (Insert Text)  Description  LTG:  (1.)Ms. Diaz will move from supine to sit and sit to supine, scoot up and down and roll side to side in bed with INDEPENDENT within 7 day(s). (2.)Ms. Diaz will transfer from bed to chair and chair to bed with modified INDEPENDENT using the least restrictive device within 7 day(s). (3.)Ms. Diaz will ambulate with modified INDEPENDENCE for 50+ feet with the least restrictive device within 7 day(s). (4.)Ms. Diaz will perform standing static and dynamic balance activities x 8 minutes with SUPERVISION to improve safety within 7 day(s). (5.)Ms. Diaz will ascend and descend 4 stairs using one hand rail(s) with CGA to improve functional mobility and safety within 7 day(s). Outcome: Progressing Towards Goal     PHYSICAL THERAPY: Daily Note and PM 8/20/2019  INPATIENT: PT Visit Days : 4  Legally blind  [place] / [place] / Product Type: Medicare /       NAME/AGE/GENDER: Sandra Beard is a 68 y.o. female   PRIMARY DIAGNOSIS: Acute on chronic respiratory failure with hypoxia (HCC) [J96.21] Acute on chronic respiratory failure with hypoxia ([facility]   Acute on chronic respiratory failure with hypoxia ([facility]         ICD-10: Treatment Diagnosis:    · Other abnormalities of gait and mobility (R26.89)  · Repeated Falls (R29.6)   Precaution/Allergies:  Ferrlecit [sodium ferric gluconat-sucrose]; Sulfa (sulfonamide antibiotics); and Aspirin      ASSESSMENT:     Ms. Diaz presents supine and willing to walk. She sat up with moderate assist and immediatly complained of dizziness. She was sure she could not stand or walk but wanted to sit EOB for a little while. The dizziness subsided fairly quickly but she was still certain it would not be a good idea to try and stand. No progress today and left EOB with CNA for a bath.     This section established at most recent assessment   PROBLEM LIST (Impairments causing functional limitations):  1. Decreased Transfer Abilities  2. Decreased Ambulation Ability/Technique  3. Decreased Balance  4. Increased Pain  5. Decreased Activity Tolerance   INTERVENTIONS PLANNED: (Benefits and precautions of physical therapy have been discussed with the patient.)  1. Balance Exercise  2. Bed Mobility  3. Gait Training  4. Therapeutic Activites  5. Therapeutic Exercise/Strengthening  6. Transfer Training  7. education      TREATMENT PLAN: Frequency/Duration: 3 times a week for duration of hospital stay  Rehabilitation Potential For Stated Goals: Good     REHAB RECOMMENDATIONS (at time of discharge pending progress):    Placement: It is my opinion, based on this patient's performance to date, that Ms. Diaz may benefit from [address] after discharge due to the functional deficits listed above that are likely to improve with skilled rehabilitation because he/she has multiple medical issues that affect his/her functional mobility in the community. Equipment:    None at this time              HISTORY:   History of Present Injury/Illness (Reason for Referral):  Per MD note, \"Patient is a 68 y.o.  female with chronic respiratory failure on 3lpm & nocturnal Trilogy who presents with respiratory distress. Was found by family members this morning with her mask off, \"not looking good\", swollen and EMS was called. Family states she called about 5-6PM last night and reported Trilogy was alarming and was placing herself on O2. Unknown how long mask was off. Daughter said this morning she was conversant but then \"went out\". From last admission she has a DNR in place but family felt she was \"suffering\" today, O2 sat per family was 35% and they allowed nasal intubation. Per EMS O2 sat was 50%--was not responsive.   She received Versed in route and in the ER then placed on Versed drip as she began to move some. Was just discharged home Friday after hospital stay for syncopal type episode. Had been diagnosed with small bowel mass, Heparin bridge for AVR and endoscopy was planned but cancelled per patient desires. She is being admitted to the ICU on vent support for acute hypoxic respiratory failure. Was doing fairly well over the weekend. She is debilitated, slightly mobile with walker and overall debilitated. She fell Monday and EMS was called to the home twice but did not feel she needed to be brought to hospital.  Family reports her meds have been provided but not sure she is taking them. Was to see Dr. Go Leslie Monday but she called and cancelled appointment. Chronic Medical:  Systolic/diastolic heart failure, CAD with previous stent, cardiomyopathy with EF 20-25%, AVR with mechanical valve in 2005 and on Coumadin, ICD, HTN, HLD, COPD, pulmonary hypertension, OA, DM, MDS, former tobacco abuse-quit 2014. \"  Past Medical History/Comorbidities:   Ms. Danika Rollins  has a past medical history of Anticoagulated on Coumadin (7/9/2013), CAD (coronary artery disease) (1/20/2013), Cardiomyopathy ([facility] [address] [facility] CHF (congestive heart failure) ([address] (2/17/2017), CKD (chronic kidney disease) stage 3, GFR 30-59 ml/min ([facility]) (7/10/2013), COPD (chronic obstructive pulmonary disease) ([address] (4/2/2014), Diabetes ([address] Essential hypertension, benign (1/20/2013), HLD (hyperlipidemia), ICD (implantable cardioverter-defibrillator) in place (10/2/2014), Iron deficiency anemia due to chronic blood loss (7/29/2009), MDS (myelodysplastic syndrome) ([address] (12/17/2011), REJI (obstructive sleep apnea)-cpap (4/2/2014), Osteoarthritis, Osteopenia, Pulmonary hypertension ([address] (6/15/2016), Quadrantanopia, Skin defect (11/1/2018), and Visual complaint (12/14/2015).  She also has no past medical history of Difficult intubation, Malignant hyperthermia due to anesthesia, Nausea & vomiting, Pseudocholinesterase deficiency, or Unspecified adverse effect of anesthesia. Ms. Diaz  has a past surgical history that includes cardiac catheterization (); ir bx bone marrow diagnostic (2011); hx aortic valve replacement (, ); hx  section; hx tubal ligation; hx heart catheterization (); hx coronary stent placement (May, 2014); hx pacemaker (10/2/2014); hx endoscopy (2009); and colonoscopy (N/A, 2019). Social History/Living Environment:   Home Environment: Apartment  # Steps to Enter: 4  Rails to Enter: Yes  One/Two Story Residence: One story  Living Alone: Yes  Support Systems: Child(alysha)  Patient Expects to be Discharged to[de-identified] Apartment  Current DME Used/Available at Home: Shower chair, Walker, rollator  Tub or Shower Type: Tub/Shower combination  Prior Level of Function/Work/Activity:  Lives alone, ambulates short distances with rollator independently. CLTC assists with ADL's. Number of Personal Factors/Comorbidities that affect the Plan of Care: 3+: HIGH COMPLEXITY   EXAMINATION:   Most Recent Physical Functioning:   Gross Assessment:                  Posture:     Balance:    Bed Mobility:  Supine to Sit: Minimum assistance  Scooting: Moderate assistance  Wheelchair Mobility:     Transfers:     Gait:            Body Structures Involved:  1. Lungs Body Functions Affected:  1. Sensory/Pain  2. Respiratory  3. Movement Related Activities and Participation Affected:  1. Mobility  2. Self Care  3. Domestic Life   Number of elements that affect the Plan of Care: 3: MODERATE COMPLEXITY   CLINICAL PRESENTATION:   Presentation: Stable and uncomplicated: LOW COMPLEXITY   CLINICAL DECISION MAKIN [facility] [address] [id number] AM-PAC 6 Clicks   Basic Mobility Inpatient Short Form  How much difficulty does the patient currently have. .. Unable A Lot A Little None   1. Turning over in bed (including adjusting bedclothes, sheets and blankets)? ? 1   ? 2   ? 3   ? 4   2.   Sitting down on and standing up from a chair with arms ( e.g., wheelchair, bedside commode, etc.)   ? 1   ? 2   ? 3   ? 4   3. Moving from lying on back to sitting on the side of the bed?   ? 1   ? 2   ? 3   ? 4   How much help from another person does the patient currently need. .. Total A Lot A Little None   4. Moving to and from a bed to a chair (including a wheelchair)? ? 1   ? 2   ? 3   ? 4   5. Need to walk in hospital room? ? 1   ? 2   ? 3   ? 4   6. Climbing 3-5 steps with a railing? ? 1   ? 2   ? 3   ? 4   © 2007, Trustees of [address] [place] [address] [address] [id number], under license to [employer]. All rights reserved      Score:  Initial: 18 Most Recent: X (Date: -- )    Interpretation of Tool:  Represents activities that are increasingly more difficult (i.e. Bed mobility, Transfers, Gait). Medical Necessity:     · Patient is expected to demonstrate progress in functional technique and activity tolerance   ·  to increase independence with   and improve safety during all functional mobility. · .  Reason for Services/Other Comments:  · Patient continues to require skilled intervention due to medical complications and decline in functional mobility. · .   Use of outcome tool(s) and clinical judgement create a POC that gives a: Clear prediction of patient's progress: LOW COMPLEXITY            TREATMENT:   (In addition to Assessment/Re-Assessment sessions the following treatments were rendered)   Pre-treatment Symptoms/Complaints:  none. Pain: Initial:   Pain Intensity 1: 0  Post Session:  No complaints     Therapeutic Activity: (    15 minutes): Therapeutic activities including Bed transfers only today as she thought she was to dizzy to try and stand up. She required minimal   to sit up and scoot to the edge. .       Braces/Orthotics/Lines/Etc:   · Purewick   · O2 Device: Nasal cannula  Treatment/Session Assessment:    · Response to Treatment:  pleasant and cooperative.   · Interdisciplinary Collaboration:   o Physical Therapy Assistant  o Registered Nurse  · After treatment position/precautions:   o Bed/Chair-wheels locked  o Bed in low position  o Call light within reach  o RN notified  o sitting EOB   · Compliance with Program/Exercises: Compliant most of the time  · Recommendations/Intent for next treatment session: \"Next visit will focus on advancements to more challenging activities and reduction in assistance provided\".   Total Treatment Duration:  PT Patient Time In/Time Out  Time In: 1335  Time Out: 1350    Stephanie Ty, CRYS

## 2019-08-20 NOTE — PROGRESS NOTES
OT note:  Pt declined treatment due to \"needing a blood transfusion. \" Pt stated that she is feeling dizzy due to above.   Cydney Garza

## 2019-08-21 NOTE — DISCHARGE INSTRUCTIONS
Patient Education     Patient Education     Patient Education        Patient Education     DISCHARGE SUMMARY from Nurse    PATIENT INSTRUCTIONS:    After general anesthesia or intravenous sedation, for 24 hours or while taking prescription Narcotics:  · Limit your activities  · Do not drive and operate hazardous machinery  · Do not make important personal or business decisions  · Do  not drink alcoholic beverages  · If you have not urinated within 8 hours after discharge, please contact your surgeon on call. Report the following to your surgeon:  · Excessive pain, swelling, redness or odor of or around the surgical area  · Temperature over 100.5  · Nausea and vomiting lasting longer than 4 hours or if unable to take medications  · Any signs of decreased circulation or nerve impairment to extremity: change in color, persistent  numbness, tingling, coldness or increase pain  · Any questions    What to do at Home:  *  Please give a list of your current medications to your Primary Care Provider. *  Please update this list whenever your medications are discontinued, doses are      changed, or new medications (including over-the-counter products) are added. *  Please carry medication information at all times in case of emergency situations. These are general instructions for a healthy lifestyle:    No smoking/ No tobacco products/ Avoid exposure to second hand smoke  Surgeon General's Warning:  Quitting smoking now greatly reduces serious risk to your health.     Obesity, smoking, and sedentary lifestyle greatly increases your risk for illness    A healthy diet, regular physical exercise & weight monitoring are important for maintaining a healthy lifestyle    You may be retaining fluid if you have a history of heart failure or if you experience any of the following symptoms:  Weight gain of 3 pounds or more overnight or 5 pounds in a week, increased swelling in our hands or feet or shortness of breath while lying flat in bed. Please call your doctor as soon as you notice any of these symptoms; do not wait until your next office visit. The discharge information has been reviewed with the caregiver. The caregiver verbalized understanding. Discharge medications reviewed with the caregiver and appropriate educational materials and side effects teaching were provided. ___________________________________________________________________________________________________________________________________     Acute Kidney Injury: Care Instructions  Your Care Instructions    Acute kidney injury (FELIZ) is a sudden decrease in kidney function. This can happen over a period of hours, days or, in some cases, weeks. FELIZ used to be called acute renal failure, but kidney failure doesn't always happen with FELIZ. Common causes of FELIZ are dehydration, blood loss, and medicines. When FELIZ happens, the kidneys have trouble removing waste and excess fluids from the body. The waste and fluids build up and become harmful. Kidney function may return to normal if the cause of FELIZ is treated quickly. Your chance of a full recovery depends on what caused the problem, how quickly the cause was treated, and what other medical problems you have. A machine may be used to help your kidneys remove waste and fluids for a short period of time. This is called dialysis. Follow-up care is a key part of your treatment and safety. Be sure to make and go to all appointments, and call your doctor if you are having problems. It's also a good idea to know your test results and keep a list of the medicines you take. How can you care for yourself at home? · Talk to your doctor about how much fluid you should drink. · Eat a balanced diet. Talk to your doctor or a dietitian about what type of diet may be best for you. You may need to limit sodium, potassium, and phosphorus.   · If you need dialysis, follow the instructions and schedule for dialysis that your doctor gives you. · Do not smoke. Smoking can make your condition worse. If you need help quitting, talk to your doctor about stop-smoking programs and medicines. These can increase your chances of quitting for good. · Do not drink alcohol. · Review all of your medicines with your doctor. Do not take any medicines, including nonsteroidal anti-inflammatory drugs (NSAIDs) such as ibuprofen (Advil, Motrin) or naproxen (Aleve), unless your doctor says it is safe for you to do so. · Make sure that anyone treating you for any health problem knows that you have had FELIZ. When should you call for help? Call 911 anytime you think you may need emergency care. For example, call if:    · You passed out (lost consciousness).    Call your doctor now or seek immediate medical care if:    · You have new or worse nausea and vomiting.     · You have much less urine than normal, or you have no urine.     · You are feeling confused or cannot think clearly.     · You have new or more blood in your urine.     · You have new swelling.     · You are dizzy or lightheaded, or feel like you may faint.    Watch closely for changes in your health, and be sure to contact your doctor if:    · You do not get better as expected. Where can you learn more? Go to http://macrina-thuy.info/. Enter S902 in the search box to learn more about \"Acute Kidney Injury: Care Instructions. \"  Current as of: October 31, 2018  Content Version: 12.1  © 5406-0039 [employer]. Care instructions adapted under license by [employer] (which disclaims liability or warranty for this information). If you have questions about a medical condition or this instruction, always ask your healthcare professional. [place] [address] any warranty or liability for your use of this information.          Oxygen Therapy for Heart Failure: Care Instructions  Your Care Instructions  When you have heart failure, your heart does not pump as well as it should. So it does not send enough oxygen-rich blood to the rest of your body. Oxygen therapy increases the amount of oxygen sent to your body's tissues. This helps reduce your heart's workload. It can help you breathe easier and let you do more. Follow-up care is a key part of your treatment and safety. Be sure to make and go to all appointments, and call your doctor if you are having problems. It's also a good idea to know your test results and keep a list of the medicines you take. How can you care for yourself at home? To help yourself  · Using oxygen may dry out your nose or lips. Use water-based lubricants on your lips or nostrils. Do not use an oil-based product like petroleum jelly. · If you use a nasal cannula, the tubing may rub under your nostrils and around your ears. To keep your skin from getting sore, tuck some gauze under the tubing. Use a water-based lotion on rubbed areas. · Do not use alcohol or take drugs that relax you, because they will slow your breathing rate. · Keep track of how much oxygen is in the tank, and reorder before it runs out. If a holiday is coming up or you expect bad weather, order in advance or make your regular order larger. · You may need extra oxygen when you travel to high altitudes or travel by plane. Ask your doctor about this. · If you are getting oxygen directly to your windpipe through an opening in your neck, your doctor will teach you how to care for the equipment. Check your airflow if you think you are not getting oxygen  · Check the flow by holding your mask or cannula up to your ear and listening for the \"hiss\" of airflow. · If you have a nasal cannula, dip the prongs in a glass of water. If you see bubbles, oxygen is coming through. · Check your pressure gauge or contents indicator. · If you use an oxygen concentrator, make sure it is turned on and plugged in. If you use a cylinder, make sure the valve is open.   · Look for kinks, blockages, or water in the tubing. Be sure the tubing is connected to the oxygen source. · Do not change your oxygen flow rate. Your doctor sets this at the correct level. Higher flow rates usually do not help and can increase the risk of harmful carbon dioxide buildup in the blood. To be safe  · Do not leave cords or tubing running across an area where you or someone else may trip on it. · Do not let oxygen containers get hot. Store them in a cool place where there is airflow. Do not leave them in a car trunk or a hot vehicle. · Keep oxygen containers upright. Make sure they do not fall over and get damaged. · Watch for signs of oxygen leaks. If you hear a loud hissing from your container or if it empties too fast, stay away from the container. Open windows. Call the company that brought the oxygen system to your home right away. · Do not use oxygen around anything that could spark or easily cause a fire. ? Do not smoke or let others smoke while you are using oxygen. Put up \"No smoking\" signs in your home. ? Do not use oxygen near open flames, such as candles, fireplaces, gas stoves, or hot water heaters. Do not use it near electric razors, hair dryers, heating pads, or anything that may spark. ? Keep a working fire extinguisher in your home that is easy to get to.  ? If a fire starts, turn off the oxygen right away and leave the house. ? If you have an oxygen concentrator, do not use it if the cord looks damaged. Do not use an extension cord to plug it in. Do not plug it into an outlet that has other appliances plugged into it. To care for the equipment  · Follow the directions that come with the equipment for using and caring for it. · Wash your cannula or mask with a liquid soap and warm water 1 or 2 times a week. Replace them every 2 to 4 weeks. · If you have a cold, change the nasal prongs when your cold symptoms are done.   · If you have an oxygen concentrator, unplug the unit and wipe down the cabinet with a damp cloth daily. Clean the air filter at least 2 times a week. Where can you learn more? Go to http://macrina-thuy.info/. Enter N901 in the search box to learn more about \"Oxygen Therapy for Heart Failure: Care Instructions. \"  Current as of: July 22, 2018  Content Version: 12.1  © 0253-2192 [employer]. Care instructions adapted under license by [employer] (which disclaims liability or warranty for this information). If you have questions about a medical condition or this instruction, always ask your healthcare professional. [place] [address] any warranty or liability for your use of this information. Heart Failure: Care Instructions  Your Care Instructions    Heart failure occurs when your heart does not pump as much blood as the body needs. Failure does not mean that the heart has stopped pumping but rather that it is not pumping as well as it should. Over time, this causes fluid buildup in your lungs and other parts of your body. Fluid buildup can cause shortness of breath, fatigue, swollen ankles, and other problems. By taking medicines regularly, reducing sodium (salt) in your diet, checking your weight every day, and making lifestyle changes, you can feel better and live longer. Follow-up care is a key part of your treatment and safety. Be sure to make and go to all appointments, and call your doctor if you are having problems. It's also a good idea to know your test results and keep a list of the medicines you take. How can you care for yourself at home? Medicines    · Be safe with medicines. Take your medicines exactly as prescribed.  Call your doctor if you think you are having a problem with your medicine.     · Do not take any vitamins, over-the-counter medicine, or herbal products without talking to your doctor first. Brett Carton not take ibuprofen (Advil or Motrin) and naproxen (Aleve) without talking to your doctor first. They could make your heart failure worse.     · You may be taking some of the following medicine. ? Beta-blockers can slow heart rate, decrease blood pressure, and improve your condition. Taking a beta-blocker may lower your chance of needing to be hospitalized. ? Angiotensin-converting enzyme inhibitors (ACEIs) reduce the heart's workload, lower blood pressure, and reduce swelling. Taking an ACEI may lower your chance of needing to be hospitalized again. ? Angiotensin II receptor blockers (ARBs) work like ACEIs. Your doctor may prescribe them instead of ACEIs. ? Diuretics, also called water pills, reduce swelling. ? Potassium supplements replace this important mineral, which is sometimes lost with diuretics. ? Aspirin and other blood thinners prevent blood clots, which can cause a stroke or heart attack.    You will get more details on the specific medicines your doctor prescribes. Diet    · Your doctor may suggest that you limit sodium to 2,000 milligrams (mg) a day or less. That is less than 1 teaspoon of salt a day, including all the salt you eat in cooking or in packaged foods. People get most of their sodium from processed foods. Fast food and restaurant meals also tend to be very high in sodium.     · Ask your doctor how much liquid you can drink each day. You may have to limit liquids.    Weight    · Weigh yourself without clothing at the same time each day. Record your weight. Call your doctor if you have a sudden weight gain, such as more than 2 to 3 pounds in a day or 5 pounds in a week. (Your doctor may suggest a different range of weight gain.) A sudden weight gain may mean that your heart failure is getting worse.    Activity level    · Start light exercise (if your doctor says it is okay). Even if you can only do a small amount, exercise will help you get stronger, have more energy, and manage your weight and your stress. Walking is an easy way to get exercise.  Start out by walking a little more than you did before. Bit by bit, increase the amount you walk.     · When you exercise, watch for signs that your heart is working too hard. You are pushing yourself too hard if you cannot talk while you are exercising. If you become short of breath or dizzy or have chest pain, stop, sit down, and rest.     · If you feel \"wiped out\" the day after you exercise, walk slower or for a shorter distance until you can work up to a better pace.     · Get enough rest at night. Sleeping with 1 or 2 pillows under your upper body and head may help you breathe easier.    Lifestyle changes    · Do not smoke. Smoking can make a heart condition worse. If you need help quitting, talk to your doctor about stop-smoking programs and medicines. These can increase your chances of quitting for good. Quitting smoking may be the most important step you can take to protect your heart.     · Limit alcohol to 2 drinks a day for men and 1 drink a day for women. Too much alcohol can cause health problems.     · Avoid getting sick from colds and the flu. Get a pneumococcal vaccine shot. If you have had one before, ask your doctor whether you need another dose. Get a flu shot each year. If you must be around people with colds or the flu, wash your hands often. When should you call for help? Call 911 if you have symptoms of sudden heart failure such as:    · You have severe trouble breathing.     · You cough up pink, foamy mucus.     · You have a new irregular or rapid heartbeat.    Call your doctor now or seek immediate medical care if:    · You have new or increased shortness of breath.     · You are dizzy or lightheaded, or you feel like you may faint.     · You have sudden weight gain, such as more than 2 to 3 pounds in a day or 5 pounds in a week.  (Your doctor may suggest a different range of weight gain.)     · You have increased swelling in your legs, ankles, or feet.     · You are suddenly so tired or weak that you cannot do your usual activities.    Watch closely for changes in your health, and be sure to contact your doctor if you develop new symptoms. Where can you learn more? Go to http://macrina-thuy.info/. Enter N334 in the search box to learn more about \"Heart Failure: Care Instructions. \"  Current as of: July 22, 2018  Content Version: 12.1  © 6840-4351 [employer]. Care instructions adapted under license by [employer] (which disclaims liability or warranty for this information). If you have questions about a medical condition or this instruction, always ask your healthcare professional. [address] any warranty or liability for your use of this information. Advance Care Planning With COPD: After Your Visit  Your Care Instructions  Taking care of yourself when you have chronic obstructive pulmonary disease (COPD) will help you feel better and live longer. But the disease gets worse over time. If your health is getting worse, you may want to make decisions about end-of-life care. Planning for the end of your life does not mean that you are giving up. It is a way to make sure that your wishes are met. Also, being clear about your wishes will make it easier for your loved ones. Making plans while you still can may ease your mind. It may help make your final days less stressful and give them more meaning. As part of your planning, it is a good idea to write advance directives. These are legal papers that tell doctors and family members your values and wishes for care at the end of your life. They may include a living will and a durable power of . You don't need a  to write these papers. Make sure that your doctor has a copy of these on file. And give a copy to a family member or close friend. You can have end-of-life care at home or in a nursing home. No matter where you are, hospice may be able to help with your care.  Hospice provides pain relief. And it can give emotional and spiritual support to you and your family. You may work with a team of health professionals. The team may include doctors, nurses, social workers, and counselors. At all stages of your disease, your team can give treatment to manage your symptoms and keep you comfortable. Follow-up care is a key part of your treatment and safety. Be sure to make and go to all appointments, and call your doctor if you are having problems. It's also a good idea to know your test results and keep a list of the medicines you take. How can you care for yourself at home? · Talk openly and honestly with your doctor. This is the best way to understand the decisions you will need to make as your health changes. Know that you can always change your mind. · Ask your doctor about life-support measures that are commonly used. These include tube feedings, breathing machines, and fluids given through a vein (IV). Understanding these treatments will help you decide whether you want them. · You may decide that you would like to have life-supporting treatments for a limited time. This allows a trial period to see if they will help you. You may also decide that you want your doctor to take only certain measures to keep you alive. It is important to spell out these conditions so that your doctor and family understand them. · Talk to your doctor about an estimate of how long you are likely to live. Your doctor likely will not be able to tell you exactly how long you have, but he or she may be able to give you a general time frame. He or she may also be able to point to things (such as oxygen needs and trouble caring for yourself) that suzanne the advance of the disease. And he or she can give you an idea about what usually happens with COPD. · Ask a friend or family member to make decisions for you when you no longer can.  Talk to this person about the kinds of treatments you want and those that you do not want. Make sure that this person understands your wishes. If this person is not your durable power of  named in your advance directive, talk with your durable power of  too. · If you have not already done so, prepare a list of advance directives. These are instructions to your doctor and family members about what kind of care you want if you can't speak or express yourself. Ask your doctor or your [facility] department for information on how to write advance directives. They may have forms you can use. ¨ Think about a do-not-resuscitate order, or DNR. This order asks that no extra treatments be used if your heart stops. Extra treatments include electrical shock to restart your heart, a machine to breathe for you, and medicines that can stimulate the heart. If you decide to have a DNR order, ask your doctor to write it. Place the order in your home where everyone can easily see it. ¨ Think about a living will. A living will explains your wishes in case you are in a coma or can't communicate. Living barber tell doctors to use or not use treatments that would keep you alive. ¨ Think about naming a person to make decisions about your care if you are not able to. This is called a durable power of . Most people ask a close friend or family member. Talk to this person about the kinds of treatments you want and those that you do not want. ¨ All of these forms are simple to change. Tell your doctor what you want to change. Ask him or her to make a note in your medical file. Give your family updated copies of the papers. When should you call for help? Be sure to contact your doctor if you have any questions. Where can you learn more? Go to [employer]  Enter G673 in the search box to learn more about \"Advance Care Planning With COPD: After Your Visit. \"   © 9571-5791 [employer].  Care instructions adapted under license by Anny [employer]ough[employer] (which disclaims liability or warranty for this information). This care instruction is for use with your licensed healthcare professional. If you have questions about a medical condition or this instruction, always ask your healthcare professional. [address] any warranty or liability for your use of this information.   Content Version: 12.0.718818; Current as of: September 30, 2013

## 2019-08-21 NOTE — DISCHARGE SUMMARY
Hospitalist Discharge Summary     Admit Date:  2019  9:57 AM   Name:  Oscar Morris   Age:  68 y.o.  :  1950   MRN:  [id number]   PCP:  Thais Marques MD  Treatment Team: Attending Provider: Cecelia Quintana MD; Utilization Review: Jenny Nuñez; Consulting Provider: Cristin Mack MD; Care Manager: Kelly Pool.; Physical Therapist: Primo Moran; Staff Nurse: Mullinnix, Marla Cogan, RN; Occupational Therapy Assistant: Lisa Harris    Problem List for this Hospitalization:  Hospital Problems as of 2019 Date Reviewed: 2019          Codes Class Noted - Resolved POA    Acute renal failure superimposed on chronic kidney disease ([facility] ICD-10-CM: N17.9, N18.9  ICD-9-CM: 584.9, 585.9  2019 - Present No        * (Principal) Acute on chronic respiratory failure with hypoxia ([facility] ICD-10-CM: J96.21  ICD-9-CM: 518.84, 799.02  2019 - Present Yes        Severe obesity ([facility] ICD-10-CM: E66.01  ICD-9-CM: 278.01  1/15/2019 - Present Yes        Acute exacerbation of CHF (congestive heart failure) ([facility] ICD-10-CM: I50.9  ICD-9-CM: 428.0  2018 - Present Yes        Debility ICD-10-CM: R53.81  ICD-9-CM: 799.3  2018 - Present Yes        Chronic respiratory failure with hypoxia ([facility] ICD-10-CM: J96.11  ICD-9-CM: 518.83, 799.02  2018 - Present         Type 2 diabetes mellitus with nephropathy ([facility] (Chronic) ICD-10-CM: E11.21  ICD-9-CM: 250.40, 583.81  2017 - Present Yes        S/P AVR (aortic valve replacement) (Chronic) ICD-10-CM: Z95.2  ICD-9-CM: V43.3  Unknown - Present Yes    Overview Signed 2016 11:04 AM by Bernardino Curiel     Mechanical/Artific             Cardiomyopathy ([facility] (Chronic) ICD-10-CM: I42.9  ICD-9-CM: 425.4  Unknown - Present Yes        ICD (implantable cardioverter-defibrillator) in place ICD-10-CM: Z95.810  ICD-9-CM: V45.02  10/2/2014 - Present Yes    Overview Signed 10/2/2014  4:58 PM by Kevin Bautista single-chamber ICD implantation 10/20/14             COPD (chronic obstructive pulmonary disease) (HCC) (Chronic) ICD-10-CM: J44.9  ICD-9-CM: 496  4/2/2014 - Present Yes    Overview Signed 10/6/2018  8:56 PM by Harika Manning NP     HOME OXYGEN 3 LITERS             REJI (obstructive sleep apnea)-cpap (Chronic) ICD-10-CM: G47.33  ICD-9-CM: 327.23  4/2/2014 - Present Yes    Overview Signed 8/7/2019 12:50 PM by Michelle Perez NP     Home Trilogy             CKD (chronic kidney disease) stage 4, GFR 15-29 ml/min (HCC) ICD-10-CM: N18.4  ICD-9-CM: 585.4  7/10/2013 - Present Yes        CAD (coronary artery disease) (Chronic) ICD-10-CM: I25.10  ICD-9-CM: 414.00  1/20/2013 - Present Yes    Overview Signed 5/20/2014 10:05 AM by Michelle Perez NP     5/8/14 PCI LAD with stent placed             Chronic combined systolic and diastolic heart failure (HCC) (Chronic) ICD-10-CM: I50.42  ICD-9-CM: 428.42  1/20/2013 - Present Yes    Overview Addendum 4/28/2018  4:53 AM by Drake Gray MD     5/8/14 ECHO:  EF 10-15%  12/2017:  EF 25-30%             MDS (myelodysplastic syndrome) ([facility] (Chronic) ICD-10-CM: D46.9  ICD-9-CM: 238.75  12/17/2011 - Present Yes    Overview Addendum 8/29/2013 11:06 AM by Reid Ferrari started in August, 2011 12/18/11 Procrit weekly and Iron stores. 5-12 12-13-12 good response to 3 weekly procrit did not need it last time    3-7-13 Pt doing well. Just wanted a \"check-up. \" Responding to Procrit every three weeks. 8-29-13 patient has missed some injections on recently restarted hemoglobin only issue , takes oral iron iron stores each time                            Hospital Course:      Ms. Diaz is a 67 yo female with PMH of chronic hypoxia on 3 L NC/ hypercarbic respiratory failure on trilogy, admitted with acute on chronic respiratory failure. She was nasally intubated in the field and then had ET intubation upon admit. She is now a DNR. .      She has been managed on IV heparin bridge to her coumadin due to prior AV replacement. INR goal per cardiology > 2.1.   EF 25%, LVDD2. She is chronically followed by cardiology.     She has been complaint with nocturnal trilogy while admitted post intubation and is weaned to 3 L NC. She received 1 unit PRBC for HGB 7.5. She will continue oral iron and epogen.      Plans are for home with palliative care upon discharge and continued CLTC. Disposition: Home or Self Care  Activity: Activity as tolerated  Diet: DIET REGULAR  Code Status: DNR      Follow up instructions, discharge meds at bottom of this note. Plan was discussed with patient. All questions answered. Patient was stable at time of discharge. Patient will call a physician or return if any concerns. Diagnostic Imaging/Tests:   Xr Chest Sngl V    Result Date: 8/8/2019  EXAM: XR CHEST SNGL V INDICATION: Heart failure COMPARISON: 8/7/2019 FINDINGS: A portable AP radiograph of the chest was obtained at 0423 hours. The patient is on a cardiac monitor. Lobar atelectasis of the left lower lobe. Endotracheal tubes in adequate position. The cardiac and mediastinal contours and pulmonary vascularity are normal.  The bones and soft tissues are grossly within normal limits. IMPRESSION: Lobar atelectasis of the left lower lobe. Xr Chest Sngl V    Result Date: 8/7/2019  CHEST X-RAY, single portable view  8/7/2019 History: Intubated. Technique: Single frontal view of the chest. Comparison: Chest x-ray 7/28/2019 Findings: A stable left-sided intracardiac device is seen. An endotracheal tube is now seen. The tip of this is low in position located at the robert directed towards the left mainstem bronchus. Retraction of this by 3 cm should place the tip at the level of a clavicles. Stable moderate to severe cardiomegaly is seen. Lungs are expanded without appreciable pneumothorax. No evolving consolidation, pleural effusion is seen. IMPRESSION: 1.   Low position of endotracheal tube tip located at the robert with the tip directed towards the left mainstem bronchus. Recommend retraction of this by 3 cm and a repeat chest x-ray to confirm position. 2. Stable cardiomegaly. Xr Shoulder Rt Ap/lat Min 2 V    Result Date: 8/7/2019  THREE-VIEW RIGHT SHOULDER, TWO-VIEW RIGHT HUMERUS, TWO-VIEW RIGHT FOREARM: CLINICAL HISTORY:  Right upper extremity pain after fall. COMPARISON:  None. FINDINGS:  No definite fracture, malalignment, or oly bone destruction is evident. Right shoulder and humerus of June 6, 2019. Impaction injury is again noted at the medial aspect of the junction of the humeral head and neck. New calcification projecting over the central and medial space suggests the possibility of calcific tendinitis of the rotator cuff. No persistent radiopaque foreign body is seen. IMPRESSION:  1. No definite acute bony abnormality identified. 2.  Probable chronic impaction at the medial aspect of the junction of the humeral head and neck 3. Probable calcific tendinitis of the rotator cuff. Xr Humerus Rt    Result Date: 8/7/2019  THREE-VIEW RIGHT SHOULDER, TWO-VIEW RIGHT HUMERUS, TWO-VIEW RIGHT FOREARM: CLINICAL HISTORY:  Right upper extremity pain after fall. COMPARISON:  None. FINDINGS:  No definite fracture, malalignment, or oly bone destruction is evident. Right shoulder and humerus of June 6, 2019. Impaction injury is again noted at the medial aspect of the junction of the humeral head and neck. New calcification projecting over the central and medial space suggests the possibility of calcific tendinitis of the rotator cuff. No persistent radiopaque foreign body is seen. IMPRESSION:  1. No definite acute bony abnormality identified. 2.  Probable chronic impaction at the medial aspect of the junction of the humeral head and neck 3. Probable calcific tendinitis of the rotator cuff.      Xr Forearm Rt Ap/lat    Result Date: 8/7/2019  THREE-VIEW RIGHT SHOULDER, TWO-VIEW RIGHT HUMERUS, TWO-VIEW RIGHT FOREARM: CLINICAL HISTORY:  Right upper extremity pain after fall. COMPARISON:  None. FINDINGS:  No definite fracture, malalignment, or oly bone destruction is evident. Right shoulder and humerus of June 6, 2019. Impaction injury is again noted at the medial aspect of the junction of the humeral head and neck. New calcification projecting over the central and medial space suggests the possibility of calcific tendinitis of the rotator cuff. No persistent radiopaque foreign body is seen. IMPRESSION:  1. No definite acute bony abnormality identified. 2.  Probable chronic impaction at the medial aspect of the junction of the humeral head and neck 3. Probable calcific tendinitis of the rotator cuff. Xr Chest Port    Result Date: 8/7/2019  CHEST X-RAY, single portable view  8/7/2019 History: Tube repositioning. Technique: Single frontal view of the chest. Comparison: Chest x-ray 8/7/2019 Findings: A stable left-sided intracardiac device is seen. The patient is intubated. The endotracheal tube has been repositioned and is now in good position with the line 3 cm above the robert. Stable cardiomegaly is seen. Lungs are expanded without appreciable pneumothorax. No evolving consolidation, or pleural effusion is seen. IMPRESSION: 1. Good position of endotracheal tube. 2. Stable cardiomegaly. Echocardiogram results:  No results found for this visit on 08/07/19.     Procedures done this admission:  * No surgery found *    All Micro Results     Procedure Component Value Units Date/Time    CULTURE, BLOOD [[id number]] Collected:  08/07/19 0953    Order Status:  Completed Specimen:  Blood Updated:  08/12/19 1106     Special Requests: --        RIGHT  Antecubital       Culture result: NO GROWTH 5 DAYS       CULTURE, BLOOD [[id number]] Collected:  08/07/19 1959    Order Status:  Completed Specimen:  Blood Updated:  08/12/19 1106     Special Requests: -- LEFT  HAND       Culture result: NO GROWTH 5 DAYS             Labs: Results:       BMP, Mg, Phos Recent Labs     08/21/19  0818 08/20/19  0748 08/19/19  0902    142 140   K 4.4 4.1 4.2   CL 95* 99 97*   CO2 40* 40* 38*   AGAP 5* 3* 5*   BUN 51* 51* 50*   CREA 2.07* 2.08* 1.86*   CA 10.3 9.7 10.0   GLU 98 95 112*   PHOS 4.6* 3.5 3.8*      CBC Recent Labs     08/21/19  0818 08/20/19  0748 08/19/19  0902   HGB 8.7* 7.5* 7.7*   HCT 28.6* 24.6* 25.9*      LFT Recent Labs     08/21/19 0818 08/20/19  0748 08/19/19  0902   ALB 3.2 3.0* 3.0*      Cardiac Testing Lab Results   Component Value Date/Time    BNP 1,822 (H) 08/08/2019 02:41 PM     (H) 08/07/2019 09:53 AM    BNP 1,406 (H) 08/01/2019 04:48 AM     02/15/2017 01:09 PM     02/16/2016 06:04 AM     06/02/2014 04:15 AM    B-type Natriuretic Peptide 250.7 (H) 02/25/2016 03:38 PM    CK 29 07/28/2019 04:46 PM    CK 47 05/15/2014 07:46 AM    CK 54 04/14/2014 02:40 PM    CK - MB 0.9 02/18/2010 04:27 AM    CK - MB <0.5 02/17/2010 10:30 PM    CK-MB Index 2.9 (H) 02/18/2010 04:27 AM    CK-MB Index CANNOT BE CALCULATED 02/17/2010 10:30 PM    Troponin-I <0.05 01/31/2012 03:32 PM    Troponin-I, Qt. 0.06 (H) 07/29/2019 04:35 AM    Troponin-I, Qt. 0.08 (H) 07/28/2019 10:51 PM    Troponin-I, Qt. 0.07 (H) 07/28/2019 04:46 PM      Coagulation Tests Lab Results   Component Value Date/Time    Prothrombin time 24.3 (H) 08/21/2019 08:18 AM    Prothrombin time 24.7 (H) 08/20/2019 07:48 AM    Prothrombin time 24.1 (H) 08/18/2019 07:25 AM    INR 2.1 08/21/2019 08:18 AM    INR 2.1 08/20/2019 07:48 AM    INR 2.1 08/18/2019 07:25 AM    aPTT 68.8 (H) 08/18/2019 11:01 PM    aPTT 124.5 (H) 08/18/2019 07:25 AM    aPTT 102.4 (H) 08/17/2019 05:48 AM      A1c Lab Results   Component Value Date/Time    Hemoglobin A1c 5.8 (H) 04/05/2019 03:10 PM    Hemoglobin A1c 6.1 (H) 11/19/2018 06:28 AM    Hemoglobin A1c 5.7 (H) 08/21/2018 11:57 AM    Hemoglobin A1c, External 6.4 06/09/2015      Lipid Panel Lab Results   Component Value Date/Time    Cholesterol, total 133 08/21/2018 11:57 AM    HDL Cholesterol 63 08/21/2018 11:57 AM    LDL, calculated 59 08/21/2018 11:57 AM    VLDL, calculated 11 08/21/2018 11:57 AM    Triglyceride 54 08/21/2018 11:57 AM    CHOL/HDL Ratio 2.7 02/16/2017 05:15 AM      Thyroid Panel Lab Results   Component Value Date/Time    TSH 1.790 07/28/2019 10:51 PM    TSH 2.620 11/16/2018 06:22 AM        Most Recent UA Lab Results   Component Value Date/Time    Color YELLOW 07/28/2019 05:51 PM    Appearance CLOUDY 07/28/2019 05:51 PM    Specific gravity 1.013 07/28/2019 05:51 PM    pH (UA) 5.0 07/28/2019 05:51 PM    Protein TRACE (A) 07/28/2019 05:51 PM    Glucose NEGATIVE  07/28/2019 05:51 PM    Ketone NEGATIVE  07/28/2019 05:51 PM    Bilirubin NEGATIVE  07/28/2019 05:51 PM    Blood NEGATIVE  07/28/2019 05:51 PM    Urobilinogen 1.0 07/28/2019 05:51 PM    Nitrites NEGATIVE  07/28/2019 05:51 PM    Leukocyte Esterase NEGATIVE  07/28/2019 05:51 PM    WBC 0 12/31/2018 02:54 PM    RBC 0-3 07/28/2019 05:51 PM    Epithelial cells 0-3 07/28/2019 05:51 PM    Bacteria TRACE 07/28/2019 05:51 PM    Casts HYALINE 07/28/2019 05:51 PM    Crystals, urine 0 01/31/2012 09:15 PM    Mucus 0 01/31/2012 09:15 PM        Allergies   Allergen Reactions    Ferrlecit [Sodium Ferric Gluconat-Sucrose] Other (comments)     Chest pain, reported MI after administration    Sulfa (Sulfonamide Antibiotics) Hives    Aspirin Nausea Only     Cannot take uncoated aspirin     Immunization History   Administered Date(s) Administered    Influenza High Dose Vaccine PF 10/01/2016, 09/01/2017, 10/17/2018    Influenza Vaccine 01/01/2013, 10/01/2014, 09/01/2015    Pneumococcal Conjugate (PCV-13) 07/17/2015    Pneumococcal Vaccine (Unspecified Type) 01/01/2013    TB Skin Test (PPD) Intradermal 05/26/2014, 04/29/2018, 09/09/2018, 10/06/2018, 11/14/2018, 06/07/2019, 07/29/2019       All Labs from Last 24 Hrs:  Recent Results (from the past 24 hour(s))   TYPE + CROSSMATCH    Collection Time: 08/20/19 11:48 AM   Result Value Ref Range    Crossmatch Expiration 08/23/2019     ABO/Rh(D) A POSITIVE     Antibody screen NEG     Unit number [id number]     Blood component type RC LR IR     Unit division 00     Status of unit TRANSFUSED     Crossmatch result Compatible    PROTHROMBIN TIME + INR    Collection Time: 08/21/19  8:18 AM   Result Value Ref Range    Prothrombin time 24.3 (H) 11.7 - 14.5 sec    INR 2.1     RENAL FUNCTION PANEL    Collection Time: 08/21/19  8:18 AM   Result Value Ref Range    Sodium 140 136 - 145 mmol/L    Potassium 4.4 3.5 - 5.1 mmol/L    Chloride 95 (L) 98 - 107 mmol/L    CO2 40 (H) 21 - 32 mmol/L    Anion gap 5 (L) 7 - 16 mmol/L    Glucose 98 65 - 100 mg/dL    BUN 51 (H) 8 - 23 MG/DL    Creatinine 2.07 (H) 0.6 - 1.0 MG/DL    GFR est AA 30 (L) >60 ml/min/1.73m2    GFR est non-AA 25 (L) >60 ml/min/1.73m2    Calcium 10.3 8.3 - 10.4 MG/DL    Phosphorus 4.6 (H) 2.3 - 3.7 MG/DL    Albumin 3.2 3.2 - 4.6 g/dL   HGB & HCT    Collection Time: 08/21/19  8:18 AM   Result Value Ref Range    HGB 8.7 (L) 11.7 - 15.4 g/dL    HCT 28.6 (L) 35.8 - 46.3 %       Current Med List in Hospital:   Current Facility-Administered Medications   Medication Dose Route Frequency    warfarin (COUMADIN) tablet 5 mg  5 mg Oral QPM    0.9% sodium chloride infusion 250 mL  250 mL IntraVENous PRN    albuterol (PROVENTIL VENTOLIN) nebulizer solution 2.5 mg  2.5 mg Nebulization Q6H RT    nystatin (MYCOSTATIN) 100,000 unit/gram powder   Topical BID    furosemide (LASIX) tablet 80 mg  80 mg Oral DAILY    lidocaine 4 % patch 1 Patch  1 Patch TransDERmal Q24H    guaiFENesin ER (MUCINEX) tablet 1,200 mg  1,200 mg Oral Q12H    traMADol (ULTRAM) tablet 50 mg  50 mg Oral Q6H PRN    carvedilol (COREG) tablet 12.5 mg  12.5 mg Oral BID WITH MEALS    sodium chloride (NS) flush 5-40 mL  5-40 mL IntraVENous Q8H    sodium chloride (NS) flush 5-40 mL  5-40 mL IntraVENous PRN    LORazepam (ATIVAN) injection 2 mg  2 mg IntraVENous Q4H PRN    aspirin delayed-release tablet 81 mg  81 mg Oral DAILY    docusate sodium (COLACE) capsule 100 mg  100 mg Oral DAILY    pantoprazole (PROTONIX) tablet 40 mg  40 mg Oral DAILY    sertraline (ZOLOFT) tablet 50 mg  50 mg Oral DAILY    traZODone (DESYREL) tablet 50 mg  50 mg Oral QHS PRN    ondansetron (ZOFRAN) injection 4 mg  4 mg IntraVENous Q4H PRN       Discharge Exam:  Patient Vitals for the past 24 hrs:   Temp Pulse Resp BP SpO2   08/21/19 0741     93 %   08/21/19 0700 98.3 °F (36.8 °C) 65 18 124/67 96 %   08/21/19 0350 98 °F (36.7 °C) 63 19 (!) 132/97 92 %   08/21/19 0131     95 %   08/20/19 2328 98.1 °F (36.7 °C) 68 19 121/75 96 %   08/20/19 2203     99 %   08/20/19 2110 97.8 °F (36.6 °C) 64 18 109/63 98 %   08/20/19 2040     96 %   08/20/19 2014 98 °F (36.7 °C) 63 18 122/88 97 %   08/20/19 1900 97.5 °F (36.4 °C) 66 20 109/64 96 %   08/20/19 1819 97.6 °F (36.4 °C) 62 18 117/76 95 %   08/20/19 1600 98 °F (36.7 °C) 94 18 115/59 95 %   08/20/19 1428     94 %   08/20/19 1202 98 °F (36.7 °C) 60 18 114/74 96 %     Oxygen Therapy  O2 Sat (%): 93 % (08/21/19 0741)  Pulse via Oximetry: 65 beats per minute (08/21/19 0741)  O2 Device: Nasal cannula (08/21/19 0741)  O2 Flow Rate (L/min): 4 l/min (08/21/19 0741)  FIO2 (%): 35 % (08/19/19 0216)    Intake/Output Summary (Last 24 hours) at 8/21/2019 0939  Last data filed at 8/21/2019 0844  Gross per 24 hour   Intake 1031.3 ml   Output 2125 ml   Net -1093.7 ml       *Note that automatically entered I/Os may not be accurate; dependent on patient compliance with collection and accurate  by assistants. General:    Well nourished. Alert. No distress  CV:   Regular rate and rhythm. No murmur, rub, or gallop. Trace edema   Lungs:  Clear to auscultation bilaterally. No wheezing, rhonchi, or rales. Abdomen: Soft, nontender, nondistended. Bowel sounds normal.   Extremities: Warm and dry  Neurologic: grossly intact  Skin:     No rashes or jaundice. Psych:  Normal mood and affect. Discharge Info:   Current Discharge Medication List      START taking these medications    Details   nystatin (MYCOSTATIN) powder Apply  to affected area two (2) times a day. Qty: 1 Bottle, Refills: 0      guaiFENesin ER (MUCINEX) 1,200 mg Ta12 ER tablet Take 1 Tab by mouth every twelve (12) hours. Qty: 30 Tab, Refills: 0      albuterol (PROVENTIL VENTOLIN) 2.5 mg /3 mL (0.083 %) nebu 3 mL by Nebulization route every four (4) hours as needed for Other (shortness of breath). Qty: 30 Nebule, Refills: 0         CONTINUE these medications which have CHANGED    Details   furosemide (LASIX) 80 mg tablet Take 1 Tab by mouth daily. Qty: 90 Tab, Refills: 0      carvedilol (COREG) 12.5 mg tablet Take 1 Tab by mouth two (2) times daily (with meals). Qty: 60 Tab, Refills: 0         CONTINUE these medications which have NOT CHANGED    Details   lidocaine 4 % patch Apply to right shoulder  Qty: 10 Patch, Refills: 0      epoetin ajiro-epbx (RETACRIT) 10,000 unit/mL injection 2 mL by SubCUTAneous route Every Tues, Thur & Sat. Indications: anemia due to kidney failure  Qty: 4 mL, Refills: 0      warfarin (COUMADIN) 4 mg tablet Take 4 mg by mouth every evening. Indications: Blood Clot caused by Artificial Heart Valve      acetaminophen (TYLENOL) 325 mg tablet Take 650 mg by mouth every six (6) hours as needed for Pain.      pantoprazole (PROTONIX) 40 mg tablet Take 40 mg by mouth daily. traZODone (DESYREL) 50 mg tablet TAKE 1/2-1 TABS BY MOUTH NIGHTLY. Qty: 60 Tab, Refills: 2    Associated Diagnoses: REJI (obstructive sleep apnea)      sertraline (ZOLOFT) 50 mg tablet Take 1 Tab by mouth daily. Qty: 30 Tab, Refills: 4    Associated Diagnoses: Anxiety      fluticasone-vilanterol (BREO ELLIPTA) 200-25 mcg/dose inhaler Take 1 Puff by inhalation daily.   Qty: 1 Inhaler, Refills: 11      aspirin delayed-release 81 mg tablet Take 81 mg by mouth daily. docusate sodium (COLACE) 50 mg capsule Take 50 mg by mouth daily. ferrous gluconate 324 mg (38 mg iron) tablet TAKE 1 TAB BY MOUTH DAILY (BEFORE BREAKFAST). INDICATIONS: IRON DEFICIENCY ANEMIA  Qty: 90 Tab, Refills: 3    Associated Diagnoses: Iron deficiency anemia due to chronic blood loss      fluticasone (FLONASE) 50 mcg/actuation nasal spray 2 Sprays by Both Nostrils route daily as needed. Qty: 3 Bottle, Refills: 3      albuterol (VENTOLIN HFA) 90 mcg/actuation inhaler 2 puffs qid prn  Qty: 1 Inhaler, Refills: 11    Associated Diagnoses: Pulmonary hypertension ([address] Chronic respiratory failure with hypoxia (HCC)      OXYGEN-AIR DELIVERY SYSTEMS 3 L by Nasal route continuous. Follow Up Orders: Follow-up Appointments   Procedures    FOLLOW UP VISIT Appointment in: One Week 1 week PCP, [facility] cardiology 2 weeks, [facility] 2 weeks, palliative care 1-2 weeks thanks     1 week PCP, [facility] 2 weeks, [place] 2 weeks, palliative care 1-2 weeks thanks     Standing Status:   Standing     Number of Occurrences:   1     Order Specific Question:   Appointment in     Answer: One Week       Follow-up Information     Follow up With Specialties Details Why Contact Info    [facility] CARDIOLOGY  Schedule an appointment as soon as possible for a visit to see Dr. John Werner within one week of discharge. [id number] [facility]    Yaz Baker MD Internal Medicine   [address] [place] [address]  [phone]            Time spent in patient discharge planning and coordination 45 minutes.     Signed:  Quiana Villalba MD

## 2019-08-21 NOTE — PROGRESS NOTES
Warfarin dosing per pharmacist    Nato Issa Deborah Abdalla is a 68 y.o. female. Height: 5' 2\" (157.5 cm)    Weight: 90.3 kg (199 lb 1.2 oz)    Indication:  Mechanical AVR    Goal INR:  2.5-3.5    Home dose:  4 mg hs    Risk factors or significant drug interactions:  --    Other anticoagulants:  Heparin bridge     Daily Monitoring  Date  INR     Warfarin dose  HGB              Notes  8/7  2.5  ---   8.7  8/8  2.7  1 mg   8.9  8/9  2.9  Hold   8.1  8/10  2.4  ---   ---  8/11  1.7  1 mg + 1 mg  ---  8/12                 1.7                   1 mg                            7.9   8/13  1.6  3 mg   8.2  8/14  1.6  3 mg   ---  8/15  1.8  3 mg   7.8    8/16  1.7  4 mg   7.6  8/17  1.7  4 mg   ---  8/18  2.1  4 mg   7.4  8/19  2.2  4 mg   7.7  8/20  2.1  2 mg + 4 mg  7.5  8/21  2.1  5 mg   8.7    Pharmacy consulted to manage warfarin for Ms. Corral during this admission. Pt was just recently admitted and discharged on 4 mg hs. Per cardiology, vanessa to DC heparin drip when INR >2. INR 2.1 again this morning. Will give 5 mg dose tonight with elevated INR range. Following daily INR. Pharmacy will continue to follow.     Thank you,  Dwayne Aguayo, PharmD  Clinical Pharmacist  [phone]

## 2019-08-21 NOTE — PROGRESS NOTES
Hospitalist Note     Admit Date:  2019  9:57 AM   Name:  Lissett Callaway   Age:  68 y.o.  :  1950   MRN:  [id number]   PCP:  Jama Austin MD  Treatment Team: Attending Provider: Gerald Mooney MD; Utilization Review: Merlinda Nettles; Consulting Provider: Klever Guerrier MD; Care Manager: Ignacio Granados.; Primary Nurse: Jemma Kirk RN; Staff Nurse: Sakshi Chun RN; Occupational Therapy Assistant: Ju Morales    HPI/Subjective:     Ms. Diaz is a 69 yo female with PMH of chronic hypoxia on 3 L NC/ hypercarbic respiratory failure on trilogy, admitted with acute on chronic respiratory failure. She was nasally intubated in the field and then had ET intubation upon admit. She is now a DNR. .      She has been managed on IV heparin bridge to her coumadin due to prior AV replacement. INR goal per cardiology > 2.1.   EF 25%, LVDD2. She is chronically followed by cardiology.     She has been complaint with nocturnal trilogy while admitted post intubation and is weaned to 3 L NC.      She received 1 unit PRBC for HGB 7.5.  She will continue oral iron and epogen.      Plans are for home with palliative care upon discharge and continued C          19 declines to go home due to feeling dizzy, feels unwell, wants more blood           Objective:     Patient Vitals for the past 24 hrs:   Temp Pulse Resp BP SpO2   19 1100 97.2 °F (36.2 °C) 68 20 110/58 94 %   19 0741     93 %   19 0700 98.3 °F (36.8 °C) 65 18 124/67 96 %   19 0350 98 °F (36.7 °C) 63 19 (!) 132/97 92 %   19 0131     95 %   19 2328 98.1 °F (36.7 °C) 68 19 121/75 96 %   19 2203     99 %   19 2110 97.8 °F (36.6 °C) 64 18 109/63 98 %   19 2040     96 %   19 98 °F (36.7 °C) 63 18 122/88 97 %   19 1900 97.5 °F (36.4 °C) 66 20 109/64 96 %   19 1819 97.6 °F (36.4 °C) 62 18 117/76 95 %   19 1600 98 °F (36.7 °C) 94 18 115/59 95 %   08/20/19 1428     94 %     Oxygen Therapy  O2 Sat (%): 94 % (08/21/19 1100)  Pulse via Oximetry: 65 beats per minute (08/21/19 0741)  O2 Device: Nasal cannula (08/21/19 0741)  O2 Flow Rate (L/min): 4 l/min (08/21/19 0741)  FIO2 (%): 35 % (08/19/19 0216)  Estimated body mass index is 36.41 kg/m² as calculated from the following:    Height as of this encounter: 5' 2\" (1.575 m). Weight as of this encounter: 90.3 kg (199 lb 1.2 oz). Intake/Output Summary (Last 24 hours) at 8/21/2019 1317  Last data filed at 8/21/2019 0844  Gross per 24 hour   Intake 791.3 ml   Output 1475 ml   Net -683.7 ml       *Note that automatically entered I/Os may not be accurate; dependent on patient compliance with collection and accurate  by techs. General:    Well nourished. Alert. Obese, no distress     CV:   RRR. Midsystolic click, trace edema  Lungs:   CTAB. No wheezing, rhonchi, or rales. Abdomen:   Soft, nontender, nondistended. obese  Extremities: Warm and dry  Skin:     No rashes or jaundice.    Neuro:  No gross focal deficits    Data Review:  I have reviewed all labs, meds, and studies from the last 24 hours:    Recent Results (from the past 24 hour(s))   PROTHROMBIN TIME + INR    Collection Time: 08/21/19  8:18 AM   Result Value Ref Range    Prothrombin time 24.3 (H) 11.7 - 14.5 sec    INR 2.1     RENAL FUNCTION PANEL    Collection Time: 08/21/19  8:18 AM   Result Value Ref Range    Sodium 140 136 - 145 mmol/L    Potassium 4.4 3.5 - 5.1 mmol/L    Chloride 95 (L) 98 - 107 mmol/L    CO2 40 (H) 21 - 32 mmol/L    Anion gap 5 (L) 7 - 16 mmol/L    Glucose 98 65 - 100 mg/dL    BUN 51 (H) 8 - 23 MG/DL    Creatinine 2.07 (H) 0.6 - 1.0 MG/DL    GFR est AA 30 (L) >60 ml/min/1.73m2    GFR est non-AA 25 (L) >60 ml/min/1.73m2    Calcium 10.3 8.3 - 10.4 MG/DL    Phosphorus 4.6 (H) 2.3 - 3.7 MG/DL    Albumin 3.2 3.2 - 4.6 g/dL   HGB & HCT    Collection Time: 08/21/19  8:18 AM   Result Value Ref Range HGB 8.7 (L) 11.7 - 15.4 g/dL    HCT 28.6 (L) 35.8 - 46.3 %   GLUCOSE, POC    Collection Time: 08/21/19 11:28 AM   Result Value Ref Range    Glucose (POC) 166 (H) 65 - 100 mg/dL        All Micro Results     Procedure Component Value Units Date/Time    CULTURE, BLOOD [[id number]] Collected:  08/07/19 0953    Order Status:  Completed Specimen:  Blood Updated:  08/12/19 1106     Special Requests: --        RIGHT  Antecubital       Culture result: NO GROWTH 5 DAYS       CULTURE, BLOOD [[id number]] Collected:  08/07/19 1959    Order Status:  Completed Specimen:  Blood Updated:  08/12/19 1106     Special Requests: --        LEFT  HAND       Culture result: NO GROWTH 5 DAYS             No results found for this visit on 08/07/19.     Current Meds:  Current Facility-Administered Medications   Medication Dose Route Frequency    warfarin (COUMADIN) tablet 5 mg  5 mg Oral QPM    0.9% sodium chloride infusion 250 mL  250 mL IntraVENous PRN    albuterol (PROVENTIL VENTOLIN) nebulizer solution 2.5 mg  2.5 mg Nebulization Q6H RT    nystatin (MYCOSTATIN) 100,000 unit/gram powder   Topical BID    furosemide (LASIX) tablet 80 mg  80 mg Oral DAILY    lidocaine 4 % patch 1 Patch  1 Patch TransDERmal Q24H    guaiFENesin ER (MUCINEX) tablet 1,200 mg  1,200 mg Oral Q12H    traMADol (ULTRAM) tablet 50 mg  50 mg Oral Q6H PRN    carvedilol (COREG) tablet 12.5 mg  12.5 mg Oral BID WITH MEALS    sodium chloride (NS) flush 5-40 mL  5-40 mL IntraVENous Q8H    sodium chloride (NS) flush 5-40 mL  5-40 mL IntraVENous PRN    LORazepam (ATIVAN) injection 2 mg  2 mg IntraVENous Q4H PRN    aspirin delayed-release tablet 81 mg  81 mg Oral DAILY    docusate sodium (COLACE) capsule 100 mg  100 mg Oral DAILY    pantoprazole (PROTONIX) tablet 40 mg  40 mg Oral DAILY    sertraline (ZOLOFT) tablet 50 mg  50 mg Oral DAILY    traZODone (DESYREL) tablet 50 mg  50 mg Oral QHS PRN    ondansetron (ZOFRAN) injection 4 mg  4 mg IntraVENous Q4H PRN Other Studies (last 24 hours):  No results found.     Assessment and Plan:     Hospital Problems as of 8/21/2019 Date Reviewed: 8/9/2019          Codes Class Noted - Resolved POA    Acute renal failure superimposed on chronic kidney disease ([facility] ICD-10-CM: N17.9, N18.9  ICD-9-CM: 584.9, 585.9  8/8/2019 - Present No        * (Principal) Acute on chronic respiratory failure with hypoxia ([facility] ICD-10-CM: J96.21  ICD-9-CM: 518.84, 799.02  8/7/2019 - Present Yes        Severe obesity ([facility] ICD-10-CM: E66.01  ICD-9-CM: 278.01  1/15/2019 - Present Yes        Acute exacerbation of CHF (congestive heart failure) ([facility] ICD-10-CM: I50.9  ICD-9-CM: 428.0  11/14/2018 - Present Yes        Debility ICD-10-CM: R53.81  ICD-9-CM: 799.3  9/8/2018 - Present Yes        Chronic respiratory failure with hypoxia ([facility] ICD-10-CM: J96.11  ICD-9-CM: 518.83, 799.02  9/7/2018 - Present         Type 2 diabetes mellitus with nephropathy (HCC) (Chronic) ICD-10-CM: E11.21  ICD-9-CM: 250.40, 583.81  12/18/2017 - Present Yes        S/P AVR (aortic valve replacement) (Chronic) ICD-10-CM: Z95.2  ICD-9-CM: V43.3  Unknown - Present Yes    Overview Signed 2/23/2016 11:04 AM by Darilyn Seip     Mechanical/Artific             Cardiomyopathy ([facility] (Chronic) ICD-10-CM: I42.9  ICD-9-CM: 425.4  Unknown - Present Yes        ICD (implantable cardioverter-defibrillator) in place ICD-10-CM: Z95.810  ICD-9-CM: V45.02  10/2/2014 - Present Yes    Overview Signed 10/2/2014  4:58 PM by Blanca Regalado single-chamber ICD implantation 10/20/14             COPD (chronic obstructive pulmonary disease) ([facility] (Chronic) ICD-10-CM: J44.9  ICD-9-CM: 843  4/2/2014 - Present Yes    Overview Signed 10/6/2018  8:56 PM by Ely Alamo NP     HOME OXYGEN 3 LITERS             REJI (obstructive sleep apnea)-cpap (Chronic) ICD-10-CM: G47.33  ICD-9-CM: 327.23  4/2/2014 - Present Yes    Overview Signed 8/7/2019 12:50 PM by Berkley Dawson NP     Home Trilogy             CKD (chronic kidney disease) stage 4, GFR 15-29 ml/min (HCC) ICD-10-CM: N18.4  ICD-9-CM: 585.4  7/10/2013 - Present Yes        CAD (coronary artery disease) (Chronic) ICD-10-CM: I25.10  ICD-9-CM: 414.00  1/20/2013 - Present Yes    Overview Signed 5/20/2014 10:05 AM by Malorie Hassan NP     5/8/14 PCI LAD with stent placed             Chronic combined systolic and diastolic heart failure (HCC) (Chronic) ICD-10-CM: I50.42  ICD-9-CM: 428.42  1/20/2013 - Present Yes    Overview Addendum 4/28/2018  4:53 AM by Ginger Mc MD     5/8/14 ECHO:  EF 10-15%  12/2017:  EF 25-30%             MDS (myelodysplastic syndrome) ([facility] (Chronic) ICD-10-CM: D46.9  ICD-9-CM: 238.75  12/17/2011 - Present Yes    Overview Addendum 8/29/2013 11:06 AM by Genia Mulligan started in August, 2011 12/18/11 Procrit weekly and Iron stores. 5-12 12-13-12 good response to 3 weekly procrit did not need it last time    3-7-13 Pt doing well. Just wanted a \"check-up. \" Responding to Procrit every three weeks.   8-29-13 patient has missed some injections on recently restarted hemoglobin only issue , takes oral iron iron stores each time                      Plan:  · Acute on chronic combined hypoxic/ hypercarbic respiratory failure/ combined CHF: weaned to 3L NC, encourage nocturnal trilogy, continue lasix/coreg, discussed with patient and daughter, will try for discharge tomorrow after re-discussion of care goals and current disease process  · MDS/Anemia: currently feels symptomatic due to dizziness, reassurred that more transfusions will not be helpful but will cause overload  · AV replacement: on coumadin with daily INR monitoring, pharmacy consulted, per their notes cardiology ok with INR >2.0  · FELIZ on CKD: overall stable creatinine trends   · DM2: no coverage  · Anxiety: prn ativan    DC planning/Dispo:  Pending to home      Diet:  DIET REGULAR  DVT ppx:  INR 2.1 on coumadin    Signed:  Freeman Cook Debo Mahajan MD

## 2019-08-21 NOTE — PROGRESS NOTES
Appointments, medications, and discharge instructions reviewed with patient's daughter via phone. [facility] will be set up to transport patient home. AVS will be placed in envelope to take with patient.

## 2019-08-21 NOTE — PROGRESS NOTES
Care Management Interventions  PCP Verified by CM: Yes(confirms Dr. Uzma Cobos)  Mode of Transport at Discharge: Other (see comment)  Transition of Care Consult (CM Consult): Home Health([facility])  Discharge Durable Medical Equipment: (w/c, Rolator, Raised toilet, O2, home trilogy with [facility])  Current Support Network: Own Home, Lives Alone(daughter - [address] - on file -[phone])  Confirm Follow Up Transport: Family  Plan discussed with Pt/Family/Caregiver: Yes  Freedom of Choice Offered: Yes   Resource Information Provided?: (confirms MCR/SAMANTA-able to get rx)  Discharge Location  Discharge Placement: Home with home health  Patient is discharging home with MYNOR Garvey RN/PT/OT. Patient will also have palliative care services through [facility]. CM faxed the discharge summary to [place]. CM faxed the order for the hospital bed and bsc to [facility]. [facility] is working to get the bed delivered by Thursday. Patient will discharge home via [facility] and patient's daughter will meet her at her home. A nebulizer was delivered to patient's room from [facility]. CM remains available. Addendum  Patient reports that she's not ready for discharge. Patient will discharge tomorrow.

## 2019-08-22 NOTE — PROGRESS NOTES
VICKY recevied from [facility], [facility]. Patient remains in stable condition with respirations even/unlabored. No acute distress noted at this time. Oxygen to 3 L NC. Call light remains within reach, patient encouraged to call nurse prn assist. Will continue to monitor per policy.

## 2019-08-22 NOTE — PROGRESS NOTES
Case discussed with new RN CM for provider, Gisselle Dietz. Care transferred to [facility] for resumption of CCM episode.

## 2019-08-22 NOTE — PROGRESS NOTES
Spoke with patient's daughter regarding discharge via phone. AVS and scripts are in envelope for Christel Gusman to take. I discussed discharge with patient and assured her that her daughter will be there at her house to meet the ambulance.

## 2019-08-22 NOTE — PROGRESS NOTES
Care Management Interventions  PCP Verified by CM: Yes(confirms Dr. Juanita Juan)  Mode of Transport at Discharge: Other (see comment)  Transition of Care Consult (CM Consult): Home Health([facility])  Discharge Durable Medical Equipment: (w/c, Rolator, Raised toilet, O2, home trilogy with [facility])  Current Support Network: Own Home, Lives Alone(daughter - [address] - on file -[phone])  Confirm Follow Up Transport: Family  Plan discussed with Pt/Family/Caregiver: Yes  Freedom of Choice Offered: Yes  [address] Provided?: (confirms MCR/SAMANTA-able to get rx)  Discharge Location  Discharge Placement: Home with home health  Discharging home via [employer].

## 2019-08-22 NOTE — PROGRESS NOTES
Received transfer from [facility] for [address]. Patient discharging from hospital today. [facility] will outreach to patient 8/23/19  to complete YASMINE call. This note will not be viewable in [address]

## 2019-08-22 NOTE — DISCHARGE SUMMARY
Hospitalist Discharge Summary     Admit Date:  2019  9:57 AM   Name:  Jassi Garibay   Age:  68 y.o.  :  1950   MRN:  [id number]   PCP:  Joao Connolly MD  Treatment Team: Attending Provider: Bert Heaton MD; Utilization Review: Mary Kay Bal; Consulting Provider: Emanuel Huntley MD; Care Manager: Lester Yung.; Staff Nurse: Jeffrey Truong; Occupational Therapist: Fatmata Crowell OT    Problem List for this Hospitalization:  Hospital Problems as of 2019 Date Reviewed: 2019          Codes Class Noted - Resolved POA    Acute renal failure superimposed on chronic kidney disease ([facility] ICD-10-CM: N17.9, N18.9  ICD-9-CM: 584.9, 585.9  2019 - Present No        * (Principal) Acute on chronic respiratory failure with hypoxia ([facility].) ICD-10-CM: J96.21  ICD-9-CM: 518.84, 799.02  2019 - Present Yes        Severe obesity ([facility] ICD-10-CM: E66.01  ICD-9-CM: 278.01  1/15/2019 - Present Yes        Acute exacerbation of CHF (congestive heart failure) ([facility] ICD-10-CM: I50.9  ICD-9-CM: 428.0  2018 - Present Yes        Debility ICD-10-CM: R53.81  ICD-9-CM: 799.3  2018 - Present Yes        Chronic respiratory failure with hypoxia ([facility] ICD-10-CM: J96.11  ICD-9-CM: 518.83, 799.02  2018 - Present         Type 2 diabetes mellitus with nephropathy ([facility] (Chronic) ICD-10-CM: E11.21  ICD-9-CM: 250.40, 583.81  2017 - Present Yes        S/P AVR (aortic valve replacement) (Chronic) ICD-10-CM: Z95.2  ICD-9-CM: V43.3  Unknown - Present Yes    Overview Signed 2016 11:04 AM by Argenis Mckay     Mechanical/Artific             Cardiomyopathy [facility]) (Chronic) ICD-10-CM: I42.9  ICD-9-CM: 425.4  Unknown - Present Yes        ICD (implantable cardioverter-defibrillator) in place ICD-10-CM: Z95.810  ICD-9-CM: V45.02  10/2/2014 - Present Yes    Overview Signed 10/2/2014  4:58 PM by Philippe Riddle single-chamber ICD implantation 10/20/14 COPD (chronic obstructive pulmonary disease) (HCC) (Chronic) ICD-10-CM: J44.9  ICD-9-CM: 496  4/2/2014 - Present Yes    Overview Signed 10/6/2018  8:56 PM by Ayaan Shabazz NP     HOME OXYGEN 3 LITERS             REJI (obstructive sleep apnea)-cpap (Chronic) ICD-10-CM: G47.33  ICD-9-CM: 327.23  4/2/2014 - Present Yes    Overview Signed 8/7/2019 12:50 PM by Alison Shaw NP     Home Trilogy             CKD (chronic kidney disease) stage 4, GFR 15-29 ml/min (HCC) ICD-10-CM: N18.4  ICD-9-CM: 585.4  7/10/2013 - Present Yes        CAD (coronary artery disease) (Chronic) ICD-10-CM: I25.10  ICD-9-CM: 414.00  1/20/2013 - Present Yes    Overview Signed 5/20/2014 10:05 AM by Alison Shaw NP     5/8/14 PCI LAD with stent placed             Chronic combined systolic and diastolic heart failure (HCC) (Chronic) ICD-10-CM: I50.42  ICD-9-CM: 428.42  1/20/2013 - Present Yes    Overview Addendum 4/28/2018  4:53 AM by Nubia Cramer MD     5/8/14 ECHO:  EF 10-15%  12/2017:  EF 25-30%             MDS (myelodysplastic syndrome) ([facility] (Chronic) ICD-10-CM: D46.9  ICD-9-CM: 238.75  12/17/2011 - Present Yes    Overview Addendum 8/29/2013 11:06 AM by Emory Renae started in August, 2011 12/18/11 Procrit weekly and Iron stores. 5-12 12-13-12 good response to 3 weekly procrit did not need it last time    3-7-13 Pt doing well. Just wanted a \"check-up. \" Responding to Procrit every three weeks. 8-29-13 patient has missed some injections on recently restarted hemoglobin only issue , takes oral iron iron stores each time                            Hospital Course:    Ms. Aaron Hernandez is a 69 yo female with PMH of chronic hypoxia on 3 L NC/ hypercarbic respiratory failure on trilogy, admitted with acute on chronic respiratory failure.  She was nasally intubated in the field and then had ET intubation upon admit. She is now a DNR and reinforces this upon my discussion at discharge.     She has been managed on IV heparin bridge to her coumadin due to prior AV replacement. INR goal per cardiology > 2.1. INR is 2.4 today. EF 25%, LVDD2. She is chronically followed by cardiology.     She has been complaint with nocturnal trilogy while admitted post intubation and is weaned to 3 L NC.      She received 1 unit PRBC for HGB 7.5. She will continue oral iron and epogen. discharge HGB is 8.9.     Plans are for home with palliative care upon discharge and continued CLTC.               Disposition: Home or Self Care  Activity: Activity as tolerated  Diet: DIET REGULAR  Code Status: DNR      Follow up instructions, discharge meds at bottom of this note. Plan was discussed with ojey. All questions answered. Patient was stable at time of discharge. Patient will call a physician or return if any concerns. Diagnostic Imaging/Tests:   Xr Chest Sngl V    Result Date: 8/8/2019  EXAM: XR CHEST SNGL V INDICATION: Heart failure COMPARISON: 8/7/2019 FINDINGS: A portable AP radiograph of the chest was obtained at 0423 hours. The patient is on a cardiac monitor. Lobar atelectasis of the left lower lobe. Endotracheal tubes in adequate position. The cardiac and mediastinal contours and pulmonary vascularity are normal.  The bones and soft tissues are grossly within normal limits. IMPRESSION: Lobar atelectasis of the left lower lobe. Xr Chest Sngl V    Result Date: 8/7/2019  CHEST X-RAY, single portable view  8/7/2019 History: Intubated. Technique: Single frontal view of the chest. Comparison: Chest x-ray 7/28/2019 Findings: A stable left-sided intracardiac device is seen. An endotracheal tube is now seen. The tip of this is low in position located at the robert directed towards the left mainstem bronchus. Retraction of this by 3 cm should place the tip at the level of a clavicles. Stable moderate to severe cardiomegaly is seen. Lungs are expanded without appreciable pneumothorax. No evolving consolidation, pleural effusion is seen. IMPRESSION: 1. Low position of endotracheal tube tip located at the robert with the tip directed towards the left mainstem bronchus. Recommend retraction of this by 3 cm and a repeat chest x-ray to confirm position. 2. Stable cardiomegaly. Xr Shoulder Rt Ap/lat Min 2 V    Result Date: 8/7/2019  THREE-VIEW RIGHT SHOULDER, TWO-VIEW RIGHT HUMERUS, TWO-VIEW RIGHT FOREARM: CLINICAL HISTORY:  Right upper extremity pain after fall. COMPARISON:  None. FINDINGS:  No definite fracture, malalignment, or oly bone destruction is evident. Right shoulder and humerus of June 6, 2019. Impaction injury is again noted at the medial aspect of the junction of the humeral head and neck. New calcification projecting over the central and medial space suggests the possibility of calcific tendinitis of the rotator cuff. No persistent radiopaque foreign body is seen. IMPRESSION:  1. No definite acute bony abnormality identified. 2.  Probable chronic impaction at the medial aspect of the junction of the humeral head and neck 3. Probable calcific tendinitis of the rotator cuff. Xr Humerus Rt    Result Date: 8/7/2019  THREE-VIEW RIGHT SHOULDER, TWO-VIEW RIGHT HUMERUS, TWO-VIEW RIGHT FOREARM: CLINICAL HISTORY:  Right upper extremity pain after fall. COMPARISON:  None. FINDINGS:  No definite fracture, malalignment, or oly bone destruction is evident. Right shoulder and humerus of June 6, 2019. Impaction injury is again noted at the medial aspect of the junction of the humeral head and neck. New calcification projecting over the central and medial space suggests the possibility of calcific tendinitis of the rotator cuff. No persistent radiopaque foreign body is seen. IMPRESSION:  1. No definite acute bony abnormality identified. 2.  Probable chronic impaction at the medial aspect of the junction of the humeral head and neck 3. Probable calcific tendinitis of the rotator cuff.      Xr Forearm Rt Ap/lat    Result Date: 8/7/2019  THREE-VIEW RIGHT SHOULDER, TWO-VIEW RIGHT HUMERUS, TWO-VIEW RIGHT FOREARM: CLINICAL HISTORY:  Right upper extremity pain after fall. COMPARISON:  None. FINDINGS:  No definite fracture, malalignment, or oly bone destruction is evident. Right shoulder and humerus of June 6, 2019. Impaction injury is again noted at the medial aspect of the junction of the humeral head and neck. New calcification projecting over the central and medial space suggests the possibility of calcific tendinitis of the rotator cuff. No persistent radiopaque foreign body is seen. IMPRESSION:  1. No definite acute bony abnormality identified. 2.  Probable chronic impaction at the medial aspect of the junction of the humeral head and neck 3. Probable calcific tendinitis of the rotator cuff. Xr Chest Port    Result Date: 8/7/2019  CHEST X-RAY, single portable view  8/7/2019 History: Tube repositioning. Technique: Single frontal view of the chest. Comparison: Chest x-ray 8/7/2019 Findings: A stable left-sided intracardiac device is seen. The patient is intubated. The endotracheal tube has been repositioned and is now in good position with the line 3 cm above the robert. Stable cardiomegaly is seen. Lungs are expanded without appreciable pneumothorax. No evolving consolidation, or pleural effusion is seen. IMPRESSION: 1. Good position of endotracheal tube. 2. Stable cardiomegaly. Echocardiogram results:  No results found for this visit on 08/07/19.     Procedures done this admission:  * No surgery found *    All Micro Results     Procedure Component Value Units Date/Time    CULTURE, BLOOD [[id number]] Collected:  08/07/19 0953    Order Status:  Completed Specimen:  Blood Updated:  08/12/19 1106     Special Requests: --        RIGHT  Antecubital       Culture result: NO GROWTH 5 DAYS       CULTURE, BLOOD [[id number]] Collected:  08/07/19 1959    Order Status:  Completed Specimen:  Blood Updated:  08/12/19 3314 Special Requests: --        LEFT  HAND       Culture result: NO GROWTH 5 DAYS             Labs: Results:       BMP, Mg, Phos Recent Labs     08/22/19  0659 08/21/19  0818 08/20/19  0748    140 142   K 4.3 4.4 4.1   CL 94* 95* 99   CO2 39* 40* 40*   AGAP 6* 5* 3*   BUN 52* 51* 51*   CREA 2.07* 2.07* 2.08*   CA 10.1 10.3 9.7   GLU 92 98 95   PHOS 5.1* 4.6* 3.5      CBC Recent Labs     08/22/19  0659 08/21/19  0818 08/20/19  0748   HGB 8.9* 8.7* 7.5*   HCT 29.3* 28.6* 24.6*      LFT Recent Labs     08/22/19  0659 08/21/19  0818 08/20/19  0748   ALB 3.4 3.2 3.0*      Cardiac Testing Lab Results   Component Value Date/Time    BNP 1,822 (H) 08/08/2019 02:41 PM     (H) 08/07/2019 09:53 AM    BNP 1,406 (H) 08/01/2019 04:48 AM     02/15/2017 01:09 PM     02/16/2016 06:04 AM     06/02/2014 04:15 AM    B-type Natriuretic Peptide 250.7 (H) 02/25/2016 03:38 PM    CK 29 07/28/2019 04:46 PM    CK 47 05/15/2014 07:46 AM    CK 54 04/14/2014 02:40 PM    CK - MB 0.9 02/18/2010 04:27 AM    CK - MB <0.5 02/17/2010 10:30 PM    CK-MB Index 2.9 (H) 02/18/2010 04:27 AM    CK-MB Index CANNOT BE CALCULATED 02/17/2010 10:30 PM    Troponin-I <0.05 01/31/2012 03:32 PM    Troponin-I, Qt. 0.06 (H) 07/29/2019 04:35 AM    Troponin-I, Qt. 0.08 (H) 07/28/2019 10:51 PM    Troponin-I, Qt. 0.07 (H) 07/28/2019 04:46 PM      Coagulation Tests Lab Results   Component Value Date/Time    Prothrombin time 26.9 (H) 08/22/2019 06:59 AM    Prothrombin time 24.3 (H) 08/21/2019 08:18 AM    Prothrombin time 24.7 (H) 08/20/2019 07:48 AM    INR 2.4 08/22/2019 06:59 AM    INR 2.1 08/21/2019 08:18 AM    INR 2.1 08/20/2019 07:48 AM    aPTT 68.8 (H) 08/18/2019 11:01 PM    aPTT 124.5 (H) 08/18/2019 07:25 AM    aPTT 102.4 (H) 08/17/2019 05:48 AM      A1c Lab Results   Component Value Date/Time    Hemoglobin A1c 5.8 (H) 04/05/2019 03:10 PM    Hemoglobin A1c 6.1 (H) 11/19/2018 06:28 AM    Hemoglobin A1c 5.7 (H) 08/21/2018 11:57 AM Hemoglobin A1c, External 6.4 06/09/2015      Lipid Panel Lab Results   Component Value Date/Time    Cholesterol, total 133 08/21/2018 11:57 AM    HDL Cholesterol 63 08/21/2018 11:57 AM    LDL, calculated 59 08/21/2018 11:57 AM    VLDL, calculated 11 08/21/2018 11:57 AM    Triglyceride 54 08/21/2018 11:57 AM    CHOL/HDL Ratio 2.7 02/16/2017 05:15 AM      Thyroid Panel Lab Results   Component Value Date/Time    TSH 1.790 07/28/2019 10:51 PM    TSH 2.620 11/16/2018 06:22 AM        Most Recent UA Lab Results   Component Value Date/Time    Color YELLOW 07/28/2019 05:51 PM    Appearance CLOUDY 07/28/2019 05:51 PM    Specific gravity 1.013 07/28/2019 05:51 PM    pH (UA) 5.0 07/28/2019 05:51 PM    Protein TRACE (A) 07/28/2019 05:51 PM    Glucose NEGATIVE  07/28/2019 05:51 PM    Ketone NEGATIVE  07/28/2019 05:51 PM    Bilirubin NEGATIVE  07/28/2019 05:51 PM    Blood NEGATIVE  07/28/2019 05:51 PM    Urobilinogen 1.0 07/28/2019 05:51 PM    Nitrites NEGATIVE  07/28/2019 05:51 PM    Leukocyte Esterase NEGATIVE  07/28/2019 05:51 PM    WBC 0 12/31/2018 02:54 PM    RBC 0-3 07/28/2019 05:51 PM    Epithelial cells 0-3 07/28/2019 05:51 PM    Bacteria TRACE 07/28/2019 05:51 PM    Casts HYALINE 07/28/2019 05:51 PM    Crystals, urine 0 01/31/2012 09:15 PM    Mucus 0 01/31/2012 09:15 PM        Allergies   Allergen Reactions    Ferrlecit [Sodium Ferric Gluconat-Sucrose] Other (comments)     Chest pain, reported MI after administration    Sulfa (Sulfonamide Antibiotics) Hives    Aspirin Nausea Only     Cannot take uncoated aspirin     Immunization History   Administered Date(s) Administered    Influenza High Dose Vaccine PF 10/01/2016, 09/01/2017, 10/17/2018    Influenza Vaccine 01/01/2013, 10/01/2014, 09/01/2015    Pneumococcal Conjugate (PCV-13) 07/17/2015    Pneumococcal Vaccine (Unspecified Type) 01/01/2013    TB Skin Test (PPD) Intradermal 05/26/2014, 04/29/2018, 09/09/2018, 10/06/2018, 11/14/2018, 06/07/2019, 07/29/2019 All Labs from Last 24 Hrs:  Recent Results (from the past 24 hour(s))   PROTHROMBIN TIME + INR    Collection Time: 08/21/19  8:18 AM   Result Value Ref Range    Prothrombin time 24.3 (H) 11.7 - 14.5 sec    INR 2.1     RENAL FUNCTION PANEL    Collection Time: 08/21/19  8:18 AM   Result Value Ref Range    Sodium 140 136 - 145 mmol/L    Potassium 4.4 3.5 - 5.1 mmol/L    Chloride 95 (L) 98 - 107 mmol/L    CO2 40 (H) 21 - 32 mmol/L    Anion gap 5 (L) 7 - 16 mmol/L    Glucose 98 65 - 100 mg/dL    BUN 51 (H) 8 - 23 MG/DL    Creatinine 2.07 (H) 0.6 - 1.0 MG/DL    GFR est AA 30 (L) >60 ml/min/1.73m2    GFR est non-AA 25 (L) >60 ml/min/1.73m2    Calcium 10.3 8.3 - 10.4 MG/DL    Phosphorus 4.6 (H) 2.3 - 3.7 MG/DL    Albumin 3.2 3.2 - 4.6 g/dL   HGB & HCT    Collection Time: 08/21/19  8:18 AM   Result Value Ref Range    HGB 8.7 (L) 11.7 - 15.4 g/dL    HCT 28.6 (L) 35.8 - 46.3 %   GLUCOSE, POC    Collection Time: 08/21/19 11:28 AM   Result Value Ref Range    Glucose (POC) 166 (H) 65 - 100 mg/dL   PROTHROMBIN TIME + INR    Collection Time: 08/22/19  6:59 AM   Result Value Ref Range    Prothrombin time 26.9 (H) 11.7 - 14.5 sec    INR 2.4     RENAL FUNCTION PANEL    Collection Time: 08/22/19  6:59 AM   Result Value Ref Range    Sodium 139 136 - 145 mmol/L    Potassium 4.3 3.5 - 5.1 mmol/L    Chloride 94 (L) 98 - 107 mmol/L    CO2 39 (H) 21 - 32 mmol/L    Anion gap 6 (L) 7 - 16 mmol/L    Glucose 92 65 - 100 mg/dL    BUN 52 (H) 8 - 23 MG/DL    Creatinine 2.07 (H) 0.6 - 1.0 MG/DL    GFR est AA 30 (L) >60 ml/min/1.73m2    GFR est non-AA 25 (L) >60 ml/min/1.73m2    Calcium 10.1 8.3 - 10.4 MG/DL    Phosphorus 5.1 (H) 2.3 - 3.7 MG/DL    Albumin 3.4 3.2 - 4.6 g/dL   HGB & HCT    Collection Time: 08/22/19  6:59 AM   Result Value Ref Range    HGB 8.9 (L) 11.7 - 15.4 g/dL    HCT 29.3 (L) 35.8 - 46.3 %       Current Med List in Hospital:   Current Facility-Administered Medications   Medication Dose Route Frequency    warfarin (COUMADIN) tablet 5 mg  5 mg Oral QPM    LORazepam (ATIVAN) tablet 0.5 mg  0.5 mg Oral Q6H PRN    0.9% sodium chloride infusion 250 mL  250 mL IntraVENous PRN    albuterol (PROVENTIL VENTOLIN) nebulizer solution 2.5 mg  2.5 mg Nebulization Q6H RT    nystatin (MYCOSTATIN) 100,000 unit/gram powder   Topical BID    furosemide (LASIX) tablet 80 mg  80 mg Oral DAILY    lidocaine 4 % patch 1 Patch  1 Patch TransDERmal Q24H    guaiFENesin ER (MUCINEX) tablet 1,200 mg  1,200 mg Oral Q12H    traMADol (ULTRAM) tablet 50 mg  50 mg Oral Q6H PRN    carvedilol (COREG) tablet 12.5 mg  12.5 mg Oral BID WITH MEALS    sodium chloride (NS) flush 5-40 mL  5-40 mL IntraVENous Q8H    sodium chloride (NS) flush 5-40 mL  5-40 mL IntraVENous PRN    aspirin delayed-release tablet 81 mg  81 mg Oral DAILY    docusate sodium (COLACE) capsule 100 mg  100 mg Oral DAILY    pantoprazole (PROTONIX) tablet 40 mg  40 mg Oral DAILY    sertraline (ZOLOFT) tablet 50 mg  50 mg Oral DAILY    traZODone (DESYREL) tablet 50 mg  50 mg Oral QHS PRN    ondansetron (ZOFRAN) injection 4 mg  4 mg IntraVENous Q4H PRN       Discharge Exam:  Patient Vitals for the past 24 hrs:   Temp Pulse Resp BP SpO2   08/22/19 0720 97.8 °F (36.6 °C) 69 19 133/65 100 %   08/22/19 0716     99 %   08/22/19 0413 98.2 °F (36.8 °C) 68 18 124/60 98 %   08/22/19 0220     95 %   08/22/19 0211     97 %   08/21/19 2341 97.5 °F (36.4 °C) 65 20 129/74 95 %   08/21/19 2148     92 %   08/21/19 2021     91 %   08/21/19 1925 98.1 °F (36.7 °C) 65 20 106/55 91 %   08/21/19 1456 98 °F (36.7 °C) 64 20 117/66 98 %   08/21/19 1100 97.2 °F (36.2 °C) 68 20 110/58 94 %     Oxygen Therapy  O2 Sat (%): 100 % (08/22/19 0720)  Pulse via Oximetry: 108 beats per minute (08/22/19 0716)  O2 Device: Nasal cannula (08/22/19 0716)  O2 Flow Rate (L/min): 3 l/min (08/22/19 0716)  FIO2 (%): 35 % (08/19/19 0216)    Intake/Output Summary (Last 24 hours) at 8/22/2019 0752  Last data filed at 8/22/2019 0413  Gross per 24 hour   Intake 960 ml   Output 1625 ml   Net -665 ml       *Note that automatically entered I/Os may not be accurate; dependent on patient compliance with collection and accurate  by assistants. General:    Well nourished. Alert. No distress, elderly  CV:   Regular rate and rhythm. Systolic AV click   Lungs:  Clear to auscultation bilaterally. No wheezing, rhonchi, or rales. Abdomen: Soft, nontender, nondistended. Bowel sounds normal.   Extremities: Warm and dry. Neurologic:  grossly intact. Skin:     No rashes or jaundice. Psych:  Normal mood and affect. Discharge Info:   Current Discharge Medication List      START taking these medications    Details   nystatin (MYCOSTATIN) powder Apply  to affected area two (2) times a day. Qty: 1 Bottle, Refills: 0      guaiFENesin ER (MUCINEX) 1,200 mg Ta12 ER tablet Take 1 Tab by mouth every twelve (12) hours. Qty: 30 Tab, Refills: 0      albuterol (PROVENTIL VENTOLIN) 2.5 mg /3 mL (0.083 %) nebu 3 mL by Nebulization route every four (4) hours as needed for Other (shortness of breath). Qty: 30 Nebule, Refills: 0         CONTINUE these medications which have CHANGED    Details   furosemide (LASIX) 80 mg tablet Take 1 Tab by mouth daily. Qty: 90 Tab, Refills: 0      carvedilol (COREG) 12.5 mg tablet Take 1 Tab by mouth two (2) times daily (with meals). Qty: 60 Tab, Refills: 0         CONTINUE these medications which have NOT CHANGED    Details   lidocaine 4 % patch Apply to right shoulder  Qty: 10 Patch, Refills: 0      epoetin jairo-epbx (RETACRIT) 10,000 unit/mL injection 2 mL by SubCUTAneous route Every Tues, Thur & Sat. Indications: anemia due to kidney failure  Qty: 4 mL, Refills: 0      warfarin (COUMADIN) 4 mg tablet Take 4 mg by mouth every evening. Indications: Blood Clot caused by Artificial Heart Valve      acetaminophen (TYLENOL) 325 mg tablet Take 650 mg by mouth every six (6) hours as needed for Pain. pantoprazole (PROTONIX) 40 mg tablet Take 40 mg by mouth daily. traZODone (DESYREL) 50 mg tablet TAKE 1/2-1 TABS BY MOUTH NIGHTLY. Qty: 60 Tab, Refills: 2    Associated Diagnoses: REJI (obstructive sleep apnea)      sertraline (ZOLOFT) 50 mg tablet Take 1 Tab by mouth daily. Qty: 30 Tab, Refills: 4    Associated Diagnoses: Anxiety      fluticasone-vilanterol (BREO ELLIPTA) 200-25 mcg/dose inhaler Take 1 Puff by inhalation daily. Qty: 1 Inhaler, Refills: 11      aspirin delayed-release 81 mg tablet Take 81 mg by mouth daily. docusate sodium (COLACE) 50 mg capsule Take 50 mg by mouth daily. ferrous gluconate 324 mg (38 mg iron) tablet TAKE 1 TAB BY MOUTH DAILY (BEFORE BREAKFAST). INDICATIONS: IRON DEFICIENCY ANEMIA  Qty: 90 Tab, Refills: 3    Associated Diagnoses: Iron deficiency anemia due to chronic blood loss      fluticasone (FLONASE) 50 mcg/actuation nasal spray 2 Sprays by Both Nostrils route daily as needed. Qty: 3 Bottle, Refills: 3      albuterol (VENTOLIN HFA) 90 mcg/actuation inhaler 2 puffs qid prn  Qty: 1 Inhaler, Refills: 11    Associated Diagnoses: Pulmonary hypertension ([address] Chronic respiratory failure with hypoxia (HCC)      OXYGEN-AIR DELIVERY SYSTEMS 3 L by Nasal route continuous. Follow Up Orders: Follow-up Appointments   Procedures    FOLLOW UP VISIT Appointment in: One Week 1 week PCP, [facility] 2 weeks, [facility] 2 weeks, palliative care 1-2 weeks thanks     1 week PCP, [facility] 2 weeks, [facility] 2 weeks, palliative care 1-2 weeks thanks     Standing Status:   Standing     Number of Occurrences:   1     Order Specific Question:   Appointment in     Answer:    One Week       Follow-up Information     Follow up With Specialties Details Why Contact Info    Valarie Lambert MD Internal Medicine On 8/26/2019 3:15 PM [address]  [facility] [address] [facility] [address] [facility]      Liyah Pollack MD Cardiology On 9/5/2019 11:30 am [address]      Terrie Green, NP Nurse Practitioner On 9/4/2019 8:30 AM. Palliative Care appointment to follow. [address] [place] [address]  [phone]            Time spent in patient discharge planning and coordination 30 minutes.     Signed:  Mamie Villar MD

## 2019-08-22 NOTE — PROGRESS NOTES
Patient moved from bed to stretcher. No incident noted. Patient discharged to [facility] for transport to home. Respirations even/unlabored. NAD. No further needs noted.

## 2019-08-22 NOTE — PROGRESS NOTES
Patient placed on home trilogy with 5L O2 bled in. SpO2 is 92% and no distress or discomfort noted. Will continue to monitor.      08/21/19 2148   Oxygen Therapy   O2 Sat (%) 92 %   Pulse via Oximetry 60 beats per minute   O2 Flow Rate (L/min) 5 l/min   CPAP/BIPAP   CPAP/BIPAP Start/Stop On   Device Mode Other (comment)  (IVAPS home trilogy)   Mask Type and Size Full face   Skin Condition Intact   Pt's Home Machine Yes (type/vendor)

## 2019-08-22 NOTE — PROGRESS NOTES
Patient is discharging home today via [facility] with [address] [address] service and palliative care services through [facility]. Cm called patient's daughter to make aware of discharge orders. Daughter requested an 11 am transport time. CM remains available.

## 2019-08-22 NOTE — PROGRESS NOTES
Warfarin dosing per pharmacist    Tessa Mikerachel Smalls is a 68 y.o. female. Height: 5' 2\" (157.5 cm)    Weight: 85.1 kg (187 lb 9.6 oz)    Indication:  Mechanical AVR    Goal INR:  2.5-3.5    Home dose:  4 mg hs    Risk factors or significant drug interactions:  --    Other anticoagulants:  Heparin bridge     Daily Monitoring  Date  INR     Warfarin dose  HGB              Notes  8/7  2.5  ---   8.7  8/8  2.7  1 mg   8.9  8/9  2.9  Hold   8.1  8/10  2.4  ---   ---  8/11  1.7  1 mg + 1 mg  ---  8/12                 1.7                   1 mg                            7.9   8/13  1.6  3 mg   8.2  8/14  1.6  3 mg   ---  8/15  1.8  3 mg   7.8    8/16  1.7  4 mg   7.6  8/17  1.7  4 mg   ---  8/18  2.1  4 mg   7.4  8/19  2.2  4 mg   7.7  8/20  2.1  2 mg + 4 mg  7.5  8/21  2.1  5 mg   8.7  8/22  2.4  5 mg   8.9     Pharmacy consulted to manage warfarin for Ms. Corral during this admission. Pt was just recently admitted and discharged on 4 mg hs. Per cardiology, okay to DC heparin drip when INR >2. INR 2.4 after increased dose yesterday. Continue 5 mg this evening. Following daily INR. Pharmacy will continue to follow. Thank you,  Edwige Wise, Pharm. D.   Clinical Pharmacist  [phone]

## 2019-08-23 NOTE — PROGRESS NOTES
Transition of Care Discharge Follow-up Questionnaire   Date/Time of Call:   8/23/19 10:15 am   What was the patient hospitalized for? Acute /Chronic Respiratory Failure   Does the patient understand his/her diagnosis and/or treatment and what happened during the hospitalization? Spoke with patients daughter Leti Jordan. Agreed to VENECIA WOODWARD call. Reports patient had a good night. Did the patient receive discharge instructions? Yes   CM Assessed Risk for Readmission:       Patient stated Risk for Readmission:      High risk for readmission due to chronic illness      N/A     Review any discharge instructions (see discharge instructions/AVS in [facility]). Ask patient if they understand these. Do they have any questions? Patient daughter reports understanding discharge instructions. Were home services ordered (nursing, PT, OT, ST, etc.)? Yes- [facility]  Patient also to resume CLTC aide 6 days weekly. Daughter reports a schedule is being worked out at time of call. If so, has the first visit occurred? If not, why? (Assist with coordination of services if necessary.)   Contacted [facility]. HH scheduled to visit patient 8/24/19   Was any DME ordered? Yes   If so, has it been received? If not, why?  (Assist patient in obtaining DME orders &/or equipment if necessary.) Patients daughter reports DME to be delivered today. Complete a review of all medications (new, continued and discontinued meds per the D/C instructions and medication tab in [address] [place] [address]). Start:   Nystatin Powder- Apply to areas twice daily  Mucinex 1200 mg-1 tablet every 12 hours  Albuterol 3mL- Use via nebulizer 4 times daily as needed for SOB. Stop: N/A  Changed:   Lasix 80 mg- once daily  Coreg 12.5mg- one tablet twice daily with food. Continue: continue all other home medications        Were all new prescriptions filled?   If not, why?  (Assist patient in obtaining medications if necessary  escalate for CCM &/or SW if ongoing issues are verbalized by pt or anticipated)   No- Per patients daughter medication to be picked up today from pharmacy. Patient to begin medications upon obtaining from pharmacy. Does the patient understand the purpose and dosing instructions for all medications? (If patient has questions, provide explanation and education.)   Yes- patients daughter reports understanding all purpose and dosing medications. Does the patient have any problems in performing ADLs? (If patient is unable to perform ADLs  what is the limiting factor(s)? Do they have a support system that can assist? If no support system is present, discuss possible assistance that they may be able to obtain. Escalate for CCM/SW if ongoing issues are verbalized by pt or anticipated)   Patient requires assistance with ADLs. Family is available for assistance along with CLTC aid per patients daughter. Does the patient have all follow-up appointments scheduled? 7 day f/up with PCP?   (f/up with PCP may be w/in 14 days if patient has a f/up with their specialist w/in 7 days)    7-14 day f/up with specialist?   (or per discharge instructions)    If f/up has not been made  what actions has the care coordinator made to accomplish this? Has transportation been arranged? Dr. Junito Jiménez- 8/30/19-Original appointment 8/26/19 due to daughter needing to arrange transportation and schedule to attend with patient. [facility]-9/4/19  Palliative to follow up after pulmonary appointment. [facility]- 9/5/19       Patients daughter confirmed transportation arranged to all appointments. Any other questions or concerns expressed by the patient? Patients daughter, Siena Fortune reports no questions at time of call. She reports they have everything under control. She prefers CCM contact her via cell [phone]. Contact information provided for issues/concerns prior to follow up call.    Schedule next appointment with YASMINE Coordinator or refer to RN Case Manager/ per the workflow guidelines. When is care coordinators next follow-up call scheduled? If referred for CCM  what RN care manager was the referral assigned? Will follow up with patient on 8/28/19. YASMINE Call Completed By: Sarah Cuenca RN CCM         This note will not be viewable in [address]

## 2019-08-28 NOTE — PROGRESS NOTES
YASMINE follow up call. Spoke with patient's daughter. She reports patient doing \"pretty good. \" [facility] nursing , PT/OT visiting as scheduled. Request from PCP for Speech Eval due to difficulty with swallowing. Speech to evaluate patient today. Patient's daughter states transportation for PCP appointment 8/30/19 arranged via [address] Reminded patient's daughter of the Pulmonary and Palliative on 9/4/19. She is arranging transportation via [address] and plans to meet patient at the appointment. Daughter reports no issues at time of call. Agrees to contact CCM with future issues/concerns. This note will not be viewable in [address]

## 2019-08-28 NOTE — PROGRESS NOTES
Received notification that the patient had discharged from [facility] and contacted the listed number to schedule a home visit. Spoke with James Pinzon who stated that she and the patients daughter handled the patients care but she could not schedule a home visit and asked that I contact the patient directly. I contacted the patients number and recieved a message stating the mailbox was full and could not receive messages. HC will contact the daughter to schedule home visit.

## 2019-08-30 NOTE — PROGRESS NOTES
Home Visit # 1 Initial Visit  Health  arrived at residence of patient well known to this service to find the patient alert and oriented per the patients norms, in no acute distress and without complaint. States that she has been feeling very good since discharge from [facility]. Medicine reconciliation done, safety and physical assessments done. Education done. HC will follow in home next week. Education:Reviewed via teach back the CHF action plan to include low sodium diet and fluid restrictions, medicine compliance, daily weights and early interventions. Stressed daily weights, and early interventions strongly. CHF Assessment:  Shortness of Breath 0   Edema   1  Abdomen  1  Stress   3  Daily activities  2  Sleeping  2  Energy    2  Weight trends  0  TOTAL= 11    BP: 142/80 LA Sitting w/ Lg cuff    Weight:  198.4 lb    Medications: All medicines are on hand and the patient remains compliant. Grand-daughter assembles pill minder for the patient. Edema:  Notable edema at 2+ to bilateral lower extremities and the abdomen is soft and non-tender. Lung Sounds: Diminished but clear. Follow Up Appointments:  8/30   Dr René Beltran   Internal medicine PCP  9/4     Anuj Pryor NP  [facility] Pulmonary  9/5     Dr Ladarius Rice Cardiology  Transportation:    DME: 02 at 3 lpm via [place] continuous and Astral Bi-Pap at night. Safety Concerns: Patient is vision impaired. Uses walker. Has hand rails in bathroom. Patient/Family Concerns: None    Tasks: Contact [facility] to req. In home tele-monitoring. Physical Assessment completed and noted on CHF assessment Form. Will follow up with pt in about one week. End of Visit.  Batsheva Love, Paramedic Health

## 2019-09-03 NOTE — PROGRESS NOTES
Pt arrived via wheelchair, retacrit given per order, pt tolerated well, discharged home via wheelchair

## 2019-09-03 NOTE — PROGRESS NOTES
This note will not be viewable in [address] Contacted the patient and left message confirming tomorrow's [facility] appointment. Contacted the patients daughter to confirm tomorrow's [facility] appointment. While speaking with the daughter, the Health  confirmed that patient would follow Dr Bentley Apley order to continue 80 mg Lasix until follow up appointment on 9/5. This will supercede Shandra Barrow NP's order of additional 40 mg Lasix daily until follow up. Clint Lewis CCM, notified of Dr. Bentley Apley confirmed order. All parties voiced understanding.

## 2019-09-03 NOTE — PROGRESS NOTES
YASMINE follow up to patient's daughter. Unable to reach patient's daughter, mailbox full. Per CC note patient missed PCP follow  up appointment due to lack of transportation. Patient attended infusion this am. CC note of call to cardiologist due to patient weight increase of 5.6 lbs in 5 days. Daughter awaiting a response per CC note. Patient engaged with Health  Stephen Bazzi. Call to Miranda Dominguez to inform him of the patient's weight gain. Patient engaged with [facility]. Spoke with Brunilda Brady NP to inform her of the patient's weight gain. She ordered lasix 80 mg to increase by 1/2 tablet until follow up with cardiologist on 9/5/19. Palliative nursePadmini to contact patient's daughter regarding order. Belén Raza also reported patient missed her PCP appointment due to [address] arrived early and refused to wait on daughter to get patient ready for appointment. CCM will continue to monitor chart and attempt to outreach to patient's daughter. This notewill not be viewable in [employer].

## 2019-09-04 NOTE — PROGRESS NOTES
Call to  Sourav Meier NP [facility] to inform her of cardiologist order for patient to remain on current dose of lasix until 9/5/19 appointment (left message). Patient scheduled with [facility] this am. Health  scheduled for home visit with patient after appointment. This note will not be viewable in [address]

## 2019-09-06 NOTE — PROGRESS NOTES
Home Visit #2  Health  arrived at residence to find the patient alert and oriented per the patients norms, in no acute distress. The patient weight is down 2 lb. after a previous increase. She denies any SOB increases in edema or PRUETT. Will see cardiology tomorrow. Saw pulmonology today and had good report. HC will continue to follow with weekly in home visits. Education:Reviewed via teach back the CHF action plan to include low sodium diet and fluid restrictions, medicine compliance, daily weights and early interventions. CHF Assessment:  Shortness of Breath 2  Edema   2  Abdomen  2  Stress   4  Daily activities  3  Sleeping  0  Energy    2  Weight trends  0  TOTAL= 15    BP: 112/64 LA Sitting    Weight: 202 lb    Medications: All medicines are on hand and the patient remains compliant. Edema: No notable increase in edema to bilateral lower extremities and the abdomen is soft and non-tender. Per daughter improved from yesterday. Lung Sounds:Clear and equal bilaterally. No notable edema to bilateral lower extremities and the abdomen is soft and non-tender. Follow Up Appointments:  9/5   1130    Dr Moe Keyes    Transportation: Family    DME: Continues to wear 02 @ 3 lpm via NC continuous and is wearing an Astral BiPap nightly. Reports there are no issuses with BiPap. Safety Concerns: None    Patient/Family Concerns: None    Tasks: None    Physical Assessment completed and noted on CHF assessment Form. Will follow up with pt in about one week. End of Visit.  Christa Tejada, Paramedic Health

## 2019-09-06 NOTE — PROGRESS NOTES
YASMINE outreach. Spoke with patient's daughter Eduardo Lopez. She reports patient did attend Cardiology appointment 9/5/119. Telemonitor set up 9/5/19 to monitor patient's weight, O2 and B/P. Patient continues to be engaged with health  and Palliative Care. Patient's daughter and health  agreed to outreach to CCM with any issues/concerns prior to next follow up call. This note will not be viewable in [address]

## 2019-09-13 NOTE — PROGRESS NOTES
YSAMINE follow up call. Per patient she is feeling much better. Education from health  greatly appreciated and she reports she enjoys his visits. She continues to monitor her sodium intake fluid intake. CHF action plan in place. Reports her daughter is a nurse and she checks on her daily. Continues with HH. Engaged with Palliative Care. Oncology appointment scheduled 9/24/19. No issues/concerns at time of call. Patient agrees to contact [facility] with any issues/concerns prior to next follow up call. This note will not be viewable in [address]

## 2019-09-20 NOTE — PROGRESS NOTES
YASMINE follow up call. Patient reports doing good. No increased SOB, weights fluctuating within 1-2 pounds, and she reports she is trying to watch sodium intake but does admit to eating out a few times. Instructed patient that fast foods are high in sodium and advised to limit fast food intake. Patient verbalized understanding and agrees to limiting fast food. Patient remains engaged with health , [facility] nursing, home aide, and [facility]. She reports no issues at time of call. Agrees to contact [facility] with any issues/concerns prior to next call. This note will not be viewable in [address]

## 2019-09-20 NOTE — PROGRESS NOTES
Home Visit #4 Health  arrived at residence to find the patient alert and oriented per the patients norms, in no acute distress and without complaint. Weigh is increased 4 lbs from previous visit and 2 lb from last weight which was 09/16/19. Patient admits some non-compliance with the diet. Encouraged to follow. Denies SOB or increased PRUETT. HC will follow in home next week. Education:Reviewed via teach back the CHF action plan to include low sodium diet and fluid restrictions, medicine compliance, daily weights and early interventions. CHF Assessment: 
Shortness of Breath 0 Edema   3 Abdomen  1 Stress   4 Daily activities  3 Sleeping  0 Energy    2 Weight trends  2 
TOTAL= 15 
 
BP: 124/72 LA Sitting Weight: 210.5 lb Does not weigh daily even though there is tele-monitoring equipment in the home. Medications: All medicines are on hand and the patient remains compliant. Edema: Mild increase in edema to bilateral lower extremities and the abdomen is soft and non-tender. Lung Sounds:Clear and equal bilaterally. Follow Up Appointments: 
09/24 1100    Dr Leidy Ameczua 
09/25  1100   Podiatrist 
 
Transportation: Family/ [employer] DME: Continues to wear 02 continually and BiPAP at night. No Issues Safety Concerns: None Patient/Family Concerns: None Tasks:None Physical Assessment completed and noted on CHF assessment Form. Will follow up with pt in about one week. End of Visit. Sophie Dickerson Paramedic Health

## 2019-09-24 PROBLEM — Z23 ENCOUNTER FOR IMMUNIZATION: Status: RESOLVED | Noted: 2018-08-21 | Resolved: 2019-01-01

## 2019-09-24 NOTE — PROGRESS NOTES
Arrived to the [facility]. Retacrit completed. Patient tolerated well. Any issues or concerns during appointment: no. 
Patient aware of next infusion appointment on 10/15 (date) at 36 (time). Discharged via w/c.

## 2019-09-27 NOTE — PROGRESS NOTES
Please disregard previous outreach,  Note signed in error prior to documenting progress note. YASMINE outreach to patient unable to reach patient or leave message due to mailbox not being set up. YASMINE attempted to contact patient's daughter. Unable to reach daughter. Message left with contact information requesting a return call. This note will not be viewable in [address]

## 2019-09-27 NOTE — PROGRESS NOTES
This note will not be viewable in [address] Home Visit # 5 Health  arrived at residence to find the patient alert and oriented in mild discomfort. She advises that she is short of breath this morning and has gained to 213 lb at visit with Dr Maureen Shannon yesterday dressed, is short of breath this morning with an increase in lower extremity and abdominal edema. She advises that she has called Dr. Rosa Dooley at [facility] and is waiting for return call. Vital signs are within normal limits and the patient speaks in complete sentences. Discussed possible causes. HC will follow via chart review for Dr Pagan's response. HC will follow in house again next week. HC noted e-cigarette on chairside table. Education:Reviewed via teach back the CHF action plan to include low sodium diet and fluid restrictions, medicine compliance, daily weights and early interventions. Patient encouraged for following the plan. Discussed elevated sodium levels in prepared (Deli/resturant) foods. Encouraged to eat fresh foods. BP: 132/82 LA Sitting Weight: 209 lb Medications: All medicines are on hand and the patient remains compliant. Edema: There is notable 2+ edema to bilateral lower extremities non-pitting or weeping. Abdomen is round slightly firm and non-tender. Lung Sounds: Clear and equal bilaterally. Follow Up Appointments: 
9/26 1100   Podiatry Transportation: Family, Aide or [employer] DME: BiPAP and 02 at 3 lpm via N/C Safety Concerns: None Patient/Family Concerns: SOB and Edema Tasks: None Physical Assessment completed and noted on CHF assessment Form. Will follow up with pt in about one week. End of Visit. Vicki Bryant, Paramedic Health

## 2019-09-30 NOTE — PROGRESS NOTES
YASMINE outreach. Unable to reach patient. No answer, mailbox not set up. 2nd message left with daughter requesting a return call. Per chart review patient contacted cardiologist 9/27/19 reporting increased swelling in knees, legs, and ankles. Weight gain of 1lb in the last week, with some SOB. Cardiologist ordered to increase lasix 80 mg to twice daily for the next 2 days and contact office 9/30 or 10/1 if no improvement. CCM will continue to outreach to patient and patient's daughter. This note will not be viewable in [address]

## 2019-10-03 NOTE — PROGRESS NOTES
This note will not be viewable in [address] Home Visit # 6 Health  arrived at residence to find the patient alert and oriented per the patients norms, in mild to moderate distress. Patient reports worsening dyspnea x 2-3 days, with increased edema. She has not contacted any provider. She has appointment with her PCP on 10/03 and was hoping to make it until then. HC weighted the patient today and notes 5 lb increase from last weeks weight. Patient does not perform daily weights. HC noted patients inability to speak in complete sentences and edema. abdomen remains soft and non-tender. HC contacted the Cardiology office and spoke with triage RN Emiliano Humphries who stated she would consult Dr Erik Knox and contact the patient with his advice. HC will monitor via chart review and visit in home next week. Education:Reviewed via teach back the CHF action plan to include low sodium diet and fluid restrictions, medicine compliance, daily weights and early interventions. Extra time spent discussing low sodium options and areas where the patient continues to take in more sodium than she believed or realized. CHF Assessment: 
Shortness of Breath 4 Edema   4 Abdomen  0 Stress   5 Daily activities  3 Sleeping  2 Energy    3 Weight trends  5 TOTAL= 26 
 
BP: 134/70 LA Sitting Weight: 212.4 lb Medications: All medicines are on hand and the patient remains compliant. Edema: Notable edema to bilateral lower extremities and the abdomen is soft and non-tender. Lung Sounds:Clear and equal bilaterally. Follow Up Appointments:  
10/03  1300   Dr Sd Russell  PCP Transportation: Family/aid/[employer] DME: William and 02 at 3 lpm via [place] Safety Concerns: None Patient/Family Concerns: Edema and SOB Tasks: H[facility]P [facility] for medical advice/orders Physical Assessment completed and noted on CHF assessment Form. Will follow up with pt in about one week. End of Visit.  Maco Eller Paramedic Health

## 2019-10-04 NOTE — PROGRESS NOTES
CCM follow up call. Patient reports she did obtain Metolazone 10 mg as ordered by cardiologist. Patient is taking as ordered 20 minutes prior to lasix. she reports her breathing has improved. She has not weighted her self today stating she is waiting on aide to arrive for assistance. Emphasized the importance of daily weights. Patient verbalized understanding. Encouraged patient to outreach to Palliative nurse when needed. Patient reports she has number posted and agrees to outreach if needed. Patient also attended PCP appointment 10/3/19. PCP to outreach to hematology regarding INR checks at Infusion appointments. Next cardiology appointment scheduled 10/9/19. Patient agrees to contact CCM with issues/concerns prior to next follow up call. This note will not be viewable in [address]

## 2019-10-07 NOTE — PROGRESS NOTES
[facility] follow up call. Patient reports her SOB has improved and swelling of  lower extremities improving. Reports palliative care nurse NP visited with her this morning. Patient reports she had a good weekend. Best weekend she has had in a while. She continues to take metolazone as ordered. Reminded patient she is to continue taking until back to her original weight. She reports she hasn't weighed herself today but plans to once her aide arrives. Verbalized understanding the importance of daily weights. No issues/concerns at time of call. Health  to visit with patient this week. This note will not be viewable in [address]

## 2019-10-14 NOTE — PROGRESS NOTES
CCM follow up call. Unable to reach patient no answer. Per chart health  visited with patient 10/9/19. No SOB and weight down by 7 lbs. Patient more compliant with diet. Health  scheduled to visit again this week. CCM will continue to outreach. This note will not be viewable in [address]

## 2019-10-15 NOTE — PROGRESS NOTES
Arrived to the [facility]. Retacrit 80,000 units subcu completed. Hemoglobin=8.6 Patient instructed to report any side affects to ordering provider Patient tolerated well Any issues or concerns during appointment: No 
Patient aware of next infusion appointment on [address] @ 1100 Discharged home via wheelchair

## 2019-10-17 NOTE — PROGRESS NOTES
This note will not be viewable in [address] Home Visit # 8 Health  arrived at residence to find the patient alert and oriented per the patients norms, in no acute distress and without complaint. Denies SOB and is breathing better with no distress. Weight is down 4 lbs from previous. Is more compliant with diet. Received her Procrit injection and is feeling better. HC will continue to follow with weekly in home visits. Education:Reviewed via teach back the CHF action plan to include low sodium diet and fluid restrictions, medicine compliance, daily weights and early interventions. CHF Assessment: 
Shortness of Breath 2 Edema   1 Abdomen  1 Stress   3 Daily activities  2 Sleeping  0 Energy    1 Weight trends  0 
TOTAL= 10 BP: 118/68 LA Sitting Weight: 201.0 lb Medications: All medicines are on hand and the patient remains compliant. Edema: Notable decrease in edema to bilateral lower extremities and the abdomen is soft and non-tender. Lung Sounds:Clear and equal bilaterally. Follow Up Appointments: Patient unaware of any Transportation: Family/aid/[employer] DME: [employer]A and 02 at 3 lpm via NC continuous. Safety Concerns: None Patient/Family Concerns: None Tasks: None Physical Assessment completed and noted on CHF assessment Form. Will follow up with pt in about one week. End of Visit. Ivana Montejo, Paramedic Health

## 2019-10-28 NOTE — PROGRESS NOTES
This note will not be viewable in [address] Home Visit #9 Health  arrived at residence to find the patient alert and oriented per the patients norms, in no acute distress and without complaint. The patient denies any SOB, edema abdominal distention, Loss of appetite or Difficulty sleeping. Reports diet, fluid and med compliance. Has not weighed since last visit. Weight up today by 8+ pounds. HOSP [facility] Cardiology and spoke with triage Rn who will consult with Dr. Lewis Terrazas and speak directly with the patient or family. Encouraged the patient to follow CHF plan for issues. Patient continues to use e-cigarette. HC will have final in home visit next week. Education:Reviewed via teach back the CHF action plan to include low sodium diet and fluid restrictions, medicine compliance, daily weights and early interventions. Focus on diet and interventions. CHF Assessment: 
Shortness of Breath 2 Edema   1 Abdomen  1 Stress   3 Daily activities  2 Sleeping  0 Energy    1 Weight trends  5 TOTAL= 15 
 
BP: 124/68 LA Sitting Weight: 210lb Medications: All medicines are on hand and the patient remains compliant. Edema: No notable increase in edema to bilateral lower extremities and the abdomen is soft and non-tender. Lung Sounds:Clear and equal bilaterally. Follow Up Appointments: none known to patient Transportation: Family/ Caregiver/ [employer] DME: No Issues Safety Concerns: None Patient/Family Concerns: None Tasks: contacted Cardiology Physical Assessment completed and noted on CHF assessment Form. Will follow up with pt in about one week. End of Visit. Haydee Roberts Paramedic Health

## 2019-10-31 NOTE — PROGRESS NOTES
CCM follow up call attempted. No answer. Patient remains engaged with health  for at least another week. Health  visited with patient 10/30/19. Cardiologist contact due to weight gain of 1.6 this week and 9 lbs since last week. Due to patient not starting Metolazone last week as ordered patient is to contianue Metolazone for 5 days increase po hydration to 2L fluid restrictions and follow up with office if needed. CCM will continue to follow chart. This note will not be viewable in [address]

## 2019-11-01 NOTE — PROGRESS NOTES
This note will not be viewable in [address] Home Visit # Health  arrived at residence to find the patient alert and oriented per the patients norms, in no acute distress and without complaint. Patient denies SOB or increased PRUETT. States breathing much better than at last weeks visit. Weight was up 8 lb last week and a 5 day regimen of Metolazone 5 mg was added. Daughter advises that this was not picked up until Sunday with first dose Monday. The patients weight is increased from previous weeks visit by 1.6 lb. V/S are within normal limits. The patients abdomen is firm to rigid and she has not had BM x 2 days. Patient has not been drinking her full supply of water. [address] cardiology was consulted for orders. Advised by triage nurse Jazmyne Land to continue with Metolazone, hydrate to 2 lpd limit, encourage hot fluids to stimulate BM and to return call for problems. This was repeated to the patient and text to patients daughter Casey Rodriguezr who acknowledged understanding. HC will visit in home next week. Education:Reviewed via teach back the CHF action plan to include low sodium diet and fluid restrictions, medicine compliance, daily weights and early interventions. Stressed medicine compliance and hydration. Expressed continued access to the Health  line for concerns. CHF Assessment: 
Shortness of Breath 2 Edema   1 Abdomen  4 Stress   3 Daily activities  2 Sleeping  0 Energy    2 Weight trends  3 
TOTAL= 17 
 
BP: 118/62 LA Sitting Weight: 211.6 lb Medications: All medicines are on hand. Metolazone was started on Monday and will continue the dose for 5 days. Edema: No notable LE edema the abdomen is firm to bilateral flank regions. No BM x2 days. Lung Sounds: Clear and equal bilaterally. Follow Up Appointments: None known to patient. Transportation: Family/ Aid/ [place] DME: 02 at 3 lpm via NC and Astral BiPap nocturnally without issues. Safety Concerns: Unsteady gait at times walks without walker. Patient/Family Concerns: Weight gain Tasks: HOSP [facility] Cardiology Physical Assessment completed and noted on CHF assessment Form. Will follow up with pt in about one week. End of Visit. Yessica Taylor, Paramedic Health

## 2019-11-05 NOTE — PROGRESS NOTES
Arrived to the [facility]. Retacrit completed. Patient instructed to report any side affects to ordering provider- Patient tolerated well Any issues or concerns during appointment: No 
Patient aware of next infusion appointment on Tuesday,November 26th @ 1130 Discharged via wheelchair with friend

## 2019-11-07 NOTE — PROGRESS NOTES
This note will not be viewable in [address] Home Visit # 11  Final Home Visit Health  arrived at residence to find the patient alert and oriented per the patients norms, in no acute distress and without complaint. Patient advised that her SOB is better her abdomen is soft and her LE edema is improved. Patients weight has also decline. Patient completed course of Metolazone on Sunday. Patient has completed her CHF Bundle and this will be the final visit for [facility].. Discussed continued access to the help-line. Education: Reviewed CHF action plan and encouraged the patient to continue to follow to include low sodium diet fluid restrictions and emphasized the importance of early interventions. Patient voice understanding. CHF Assessment: 
Shortness of Breath 2 Edema   0 Abdomen  1 Stress   3 Daily activities  3 Sleeping  1 Energy    2 Weight trends  0 
TOTAL= 12 
 
BP: 112/66 LA Sitting Weight: 207.6 lb Medications: All medicines are on hand and the patient remains compliant. Edema: No notable increase in edema to bilateral lower extremities and the abdomen is soft and non-tender. Lung Sounds:Clear and equal bilaterally. Follow Up Appointments:  
11/08   1100    Dr Asa Grimaldo Cardiology Transportation: Aid/[employer] DME: No Issues. Continuous 02 at 3 lpm via NC and Astral BiPap for sleep. Safety Concerns: None Patient/Family Concerns: None Tasks: None Physical Assessment completed and noted on CHF assessment Form. Will follow this patient via chart review going forward. End of Visit. Naila To, Paramedic Health

## 2019-11-21 NOTE — PROGRESS NOTES
CCM follow up call to patient since discharged from health . Patient reports no SOB. She continues to monitor her weights. 11/18/ lbs reports today's weight 205 lbs. Reminded patient the importance to continue to monitor weights and to inform MD of weight gain of 2 lbs in a day or 5 lbs in a week. Patient verbalized understanding. Due to patient being engaged with [facility] and no issues at time of call, CCM will remove self from care team. CCM contact information provided to patient for any future issues/concerns. This note will not be viewable in [address]

## 2019-11-26 NOTE — PROGRESS NOTES
Arrived to the [facility]. Retacrit completed. Provided education on Retacrit-patient has been receiving this med for quite a while Patient instructed to report any side affects to ordering provider- Patient tolerated well Any issues or concerns during appointment: No 
Patient aware of next infusion appointment on Tuesday,December 17th @ [phone] Discharged home via wheelchair with caregiver

## 2019-12-17 NOTE — PROGRESS NOTES
Arrived to the Replaced by [facility]. Retacrit completed. Provided education on Retacrit-patient has previously received Retacrit Patient instructed to report any side affects to ordering provider- Patient tolerated well Any issues or concerns during appointment:No 
Patient aware of next infusion appointment on Tuesday,January 7,2020 @ 0930 Discharged home via wheelchair accompanied by friend

## 2020-01-01 ENCOUNTER — HOSPITAL ENCOUNTER (OUTPATIENT)
Dept: INFUSION THERAPY | Age: 70
Discharge: HOME | End: 2020-01-28
Payer: MEDICARE

## 2020-01-01 ENCOUNTER — HOSPITAL ENCOUNTER (OUTPATIENT)
Dept: INFUSION THERAPY | Age: 70
Discharge: HOME | End: 2020-02-17
Payer: MEDICARE

## 2020-01-01 ENCOUNTER — HOSPITAL ENCOUNTER (OUTPATIENT)
Dept: INFUSION THERAPY | Age: 70
Discharge: HOME | End: 2020-01-07
Payer: MEDICARE

## 2020-01-01 ENCOUNTER — HOSPITAL ENCOUNTER (OUTPATIENT)
Dept: LAB | Age: 70
Discharge: HOME | End: 2020-02-14
Payer: MEDICARE

## 2020-01-01 ENCOUNTER — HOSPITAL ENCOUNTER (EMERGENCY)
Age: 70
Discharge: HOME | End: 2020-01-17
Payer: MEDICARE

## 2020-01-01 ENCOUNTER — APPOINTMENT (OUTPATIENT)
Dept: GENERAL RADIOLOGY | Age: 70
End: 2020-01-01
Payer: MEDICARE

## 2020-01-01 VITALS
BODY MASS INDEX: 38.28 KG/M2 | HEIGHT: 62 IN | OXYGEN SATURATION: 92 % | DIASTOLIC BLOOD PRESSURE: 73 MMHG | RESPIRATION RATE: 16 BRPM | SYSTOLIC BLOOD PRESSURE: 125 MMHG | TEMPERATURE: 99.3 F | WEIGHT: 208 LBS | HEART RATE: 87 BPM

## 2020-01-01 VITALS
OXYGEN SATURATION: 94 % | HEART RATE: 66 BPM | RESPIRATION RATE: 16 BRPM | TEMPERATURE: 98.3 F | SYSTOLIC BLOOD PRESSURE: 131 MMHG | DIASTOLIC BLOOD PRESSURE: 56 MMHG

## 2020-01-01 VITALS
SYSTOLIC BLOOD PRESSURE: 112 MMHG | DIASTOLIC BLOOD PRESSURE: 48 MMHG | HEART RATE: 90 BPM | TEMPERATURE: 98.5 F | RESPIRATION RATE: 16 BRPM | OXYGEN SATURATION: 94 %

## 2020-01-01 VITALS
OXYGEN SATURATION: 95 % | RESPIRATION RATE: 16 BRPM | TEMPERATURE: 98.2 F | SYSTOLIC BLOOD PRESSURE: 138 MMHG | DIASTOLIC BLOOD PRESSURE: 65 MMHG | HEART RATE: 73 BPM

## 2020-01-01 DIAGNOSIS — A08.4 VIRAL ENTERITIS: ICD-10-CM

## 2020-01-01 DIAGNOSIS — I27.20 PULMONARY HYPERTENSION (HCC): ICD-10-CM

## 2020-01-01 DIAGNOSIS — I50.9 CONGESTIVE HEART FAILURE, UNSPECIFIED HF CHRONICITY, UNSPECIFIED HEART FAILURE TYPE (HCC): ICD-10-CM

## 2020-01-01 DIAGNOSIS — D46.9 MDS (MYELODYSPLASTIC SYNDROME) (HCC): Primary | ICD-10-CM

## 2020-01-01 DIAGNOSIS — R19.7 NAUSEA VOMITING AND DIARRHEA: Primary | ICD-10-CM

## 2020-01-01 DIAGNOSIS — R11.2 NAUSEA VOMITING AND DIARRHEA: Primary | ICD-10-CM

## 2020-01-01 LAB
ALBUMIN SERPL-MCNC: 4.1 G/DL (ref 3.2–4.6)
ALBUMIN/GLOB SERPL: 1 {RATIO} (ref 1.2–3.5)
ALP SERPL-CCNC: 58 U/L (ref 50–136)
ALT SERPL-CCNC: 14 U/L (ref 12–65)
ANION GAP SERPL CALC-SCNC: 17 MMOL/L (ref 7–16)
ANION GAP SERPL CALC-SCNC: 6 MMOL/L (ref 7–16)
AST SERPL-CCNC: 18 U/L (ref 15–37)
ATRIAL RATE: 83 BPM
BASOPHILS # BLD: 0 K/UL (ref 0–0.2)
BASOPHILS NFR BLD: 0 % (ref 0–2)
BILIRUB SERPL-MCNC: 1 MG/DL (ref 0.2–1.1)
BNP SERPL-MCNC: ABNORMAL PG/ML (ref 5–125)
BNP SERPL-MCNC: ABNORMAL PG/ML (ref 5–125)
BUN SERPL-MCNC: 82 MG/DL (ref 8–23)
BUN SERPL-MCNC: 99 MG/DL (ref 8–23)
CALCIUM SERPL-MCNC: 10.2 MG/DL (ref 8.3–10.4)
CALCIUM SERPL-MCNC: 9.5 MG/DL (ref 8.3–10.4)
CALCULATED P AXIS, ECG09: 14 DEGREES
CALCULATED R AXIS, ECG10: -58 DEGREES
CALCULATED T AXIS, ECG11: 99 DEGREES
CHLORIDE SERPL-SCNC: 96 MMOL/L (ref 98–107)
CHLORIDE SERPL-SCNC: 97 MMOL/L (ref 98–107)
CO2 SERPL-SCNC: 27 MMOL/L (ref 21–32)
CO2 SERPL-SCNC: 36 MMOL/L (ref 21–32)
CREAT SERPL-MCNC: 2 MG/DL (ref 0.6–1)
CREAT SERPL-MCNC: 2.32 MG/DL (ref 0.6–1)
DIAGNOSIS, 93000: NORMAL
DIFFERENTIAL METHOD BLD: ABNORMAL
EOSINOPHIL # BLD: 0 K/UL (ref 0–0.8)
EOSINOPHIL NFR BLD: 1 % (ref 0.5–7.8)
ERYTHROCYTE [DISTWIDTH] IN BLOOD BY AUTOMATED COUNT: 17.7 % (ref 11.9–14.6)
FERRITIN SERPL-MCNC: 42 NG/ML (ref 8–388)
GLOBULIN SER CALC-MCNC: 4.1 G/DL (ref 2.3–3.5)
GLUCOSE SERPL-MCNC: 135 MG/DL (ref 65–100)
GLUCOSE SERPL-MCNC: 151 MG/DL (ref 65–100)
HCT VFR BLD AUTO: 36.1 % (ref 35.8–46.3)
HGB BLD-MCNC: 11.1 G/DL (ref 11.7–15.4)
HGB BLD-MCNC: 9.1 G/DL (ref 11.7–15.4)
HGB BLD-MCNC: 9.7 G/DL (ref 11.7–15.4)
HGB BLD-MCNC: 9.9 G/DL (ref 11.7–15.4)
IMM GRANULOCYTES # BLD AUTO: 0 K/UL (ref 0–0.5)
IMM GRANULOCYTES NFR BLD AUTO: 0 % (ref 0–5)
INR BLD: 2.1 (ref 0.9–1.2)
IRON SATN MFR SERPL: 17 %
IRON SERPL-MCNC: 59 UG/DL (ref 35–150)
LIPASE SERPL-CCNC: 130 U/L (ref 73–393)
LYMPHOCYTES # BLD: 0.3 K/UL (ref 0.5–4.6)
LYMPHOCYTES NFR BLD: 6 % (ref 13–44)
MAGNESIUM SERPL-MCNC: 2 MG/DL (ref 1.8–2.4)
MCH RBC QN AUTO: 27.7 PG (ref 26.1–32.9)
MCHC RBC AUTO-ENTMCNC: 30.7 G/DL (ref 31.4–35)
MCV RBC AUTO: 90 FL (ref 79.6–97.8)
MONOCYTES # BLD: 0.2 K/UL (ref 0.1–1.3)
MONOCYTES NFR BLD: 3 % (ref 4–12)
NEUTS SEG # BLD: 5.3 K/UL (ref 1.7–8.2)
NEUTS SEG NFR BLD: 90 % (ref 43–78)
NRBC # BLD: 0 K/UL (ref 0–0.2)
P-R INTERVAL, ECG05: 150 MS
PLATELET # BLD AUTO: 178 K/UL (ref 150–450)
PMV BLD AUTO: 10.8 FL (ref 9.4–12.3)
POTASSIUM SERPL-SCNC: 3.7 MMOL/L (ref 3.5–5.1)
POTASSIUM SERPL-SCNC: 4 MMOL/L (ref 3.5–5.1)
PROT SERPL-MCNC: 8.2 G/DL (ref 6.3–8.2)
PT BLD: 23.9 SECS (ref 9.6–11.6)
Q-T INTERVAL, ECG07: 446 MS
QRS DURATION, ECG06: 180 MS
QTC CALCULATION (BEZET), ECG08: 524 MS
RBC # BLD AUTO: 4.01 M/UL (ref 4.05–5.2)
SODIUM SERPL-SCNC: 138 MMOL/L (ref 136–145)
SODIUM SERPL-SCNC: 141 MMOL/L (ref 136–145)
TIBC SERPL-MCNC: 345 UG/DL (ref 250–450)
TROPONIN I SERPL-MCNC: 0.09 NG/ML (ref 0.02–0.05)
TROPONIN I SERPL-MCNC: 0.11 NG/ML (ref 0.02–0.05)
VENTRICULAR RATE, ECG03: 83 BPM
WBC # BLD AUTO: 5.9 K/UL (ref 4.3–11.1)

## 2020-01-01 PROCEDURE — 74022 RADEX COMPL AQT ABD SERIES: CPT

## 2020-01-01 PROCEDURE — 83690 ASSAY OF LIPASE: CPT

## 2020-01-01 PROCEDURE — 80048 BASIC METABOLIC PNL TOTAL CA: CPT

## 2020-01-01 PROCEDURE — 83880 ASSAY OF NATRIURETIC PEPTIDE: CPT

## 2020-01-01 PROCEDURE — 83735 ASSAY OF MAGNESIUM: CPT

## 2020-01-01 PROCEDURE — 74011250636 HC RX REV CODE- 250/636: Performed by: NURSE PRACTITIONER

## 2020-01-01 PROCEDURE — 82728 ASSAY OF FERRITIN: CPT

## 2020-01-01 PROCEDURE — 36415 COLL VENOUS BLD VENIPUNCTURE: CPT

## 2020-01-01 PROCEDURE — 85018 HEMOGLOBIN: CPT

## 2020-01-01 PROCEDURE — 93005 ELECTROCARDIOGRAM TRACING: CPT

## 2020-01-01 PROCEDURE — 74011250636 HC RX REV CODE- 250/636

## 2020-01-01 PROCEDURE — 81003 URINALYSIS AUTO W/O SCOPE: CPT

## 2020-01-01 PROCEDURE — 80053 COMPREHEN METABOLIC PANEL: CPT

## 2020-01-01 PROCEDURE — 77010033678 HC OXYGEN DAILY

## 2020-01-01 PROCEDURE — 96372 THER/PROPH/DIAG INJ SC/IM: CPT

## 2020-01-01 PROCEDURE — 74011000250 HC RX REV CODE- 250

## 2020-01-01 PROCEDURE — 85610 PROTHROMBIN TIME: CPT

## 2020-01-01 PROCEDURE — 99285 EMERGENCY DEPT VISIT HI MDM: CPT

## 2020-01-01 PROCEDURE — 84484 ASSAY OF TROPONIN QUANT: CPT

## 2020-01-01 PROCEDURE — 96375 TX/PRO/DX INJ NEW DRUG ADDON: CPT

## 2020-01-01 PROCEDURE — 96374 THER/PROPH/DIAG INJ IV PUSH: CPT

## 2020-01-01 PROCEDURE — 83540 ASSAY OF IRON: CPT

## 2020-01-01 PROCEDURE — 85025 COMPLETE CBC W/AUTO DIFF WBC: CPT

## 2020-01-01 PROCEDURE — 94640 AIRWAY INHALATION TREATMENT: CPT

## 2020-01-01 PROCEDURE — 74011250637 HC RX REV CODE- 250/637

## 2020-01-01 RX ORDER — FUROSEMIDE 10 MG/ML
60 INJECTION INTRAMUSCULAR; INTRAVENOUS
Status: COMPLETED | OUTPATIENT
Start: 2020-01-01 | End: 2020-01-01

## 2020-01-01 RX ORDER — ONDANSETRON 2 MG/ML
4 INJECTION INTRAMUSCULAR; INTRAVENOUS
Status: COMPLETED | OUTPATIENT
Start: 2020-01-01 | End: 2020-01-01

## 2020-01-01 RX ORDER — HYOSCYAMINE SULFATE 0.12 MG/1
0.12 TABLET SUBLINGUAL
Status: COMPLETED | OUTPATIENT
Start: 2020-01-01 | End: 2020-01-01

## 2020-01-01 RX ORDER — IPRATROPIUM BROMIDE AND ALBUTEROL SULFATE 2.5; .5 MG/3ML; MG/3ML
3 SOLUTION RESPIRATORY (INHALATION)
Status: COMPLETED | OUTPATIENT
Start: 2020-01-01 | End: 2020-01-01

## 2020-01-01 RX ADMIN — EPOETIN ALFA-EPBX 80000 UNITS: 40000 INJECTION, SOLUTION INTRAVENOUS; SUBCUTANEOUS at 11:18

## 2020-01-01 RX ADMIN — EPOETIN ALFA-EPBX 80000 UNITS: 40000 INJECTION, SOLUTION INTRAVENOUS; SUBCUTANEOUS at 10:55

## 2020-01-01 RX ADMIN — ONDANSETRON 4 MG: 2 INJECTION INTRAMUSCULAR; INTRAVENOUS at 13:47

## 2020-01-01 RX ADMIN — FUROSEMIDE 60 MG: 10 INJECTION, SOLUTION INTRAMUSCULAR; INTRAVENOUS at 15:22

## 2020-01-01 RX ADMIN — METHYLPREDNISOLONE SODIUM SUCCINATE 125 MG: 125 INJECTION, POWDER, FOR SOLUTION INTRAMUSCULAR; INTRAVENOUS at 15:22

## 2020-01-01 RX ADMIN — IPRATROPIUM BROMIDE AND ALBUTEROL SULFATE 3 ML: .5; 3 SOLUTION RESPIRATORY (INHALATION) at 16:17

## 2020-01-01 RX ADMIN — EPOETIN ALFA-EPBX 80000 UNITS: 40000 INJECTION, SOLUTION INTRAVENOUS; SUBCUTANEOUS at 11:20

## 2020-01-01 RX ADMIN — HYOSCYAMINE SULFATE 0.12 MG: 0.12 TABLET ORAL; SUBLINGUAL at 13:47

## 2020-01-17 NOTE — ED TRIAGE NOTES
Pt arrives via EMS from home with abd cramping, nausea, and diarrhea. Pt has not vomited. Pt temp 100.0 in route. VSS. Pt states she has SOB but EMS reports clear lung sounds and SpO2 at 100% on 3LNC at home. Pt states she is on 3LNC all the time. Pt denies being around others that have been sick. Pt denies cough, chills. Pt reports urinary frequency but denies any other urinary s/sx. Pt has CHF and states she noticed swelling in feet, legs, and abd. Pt takes lasix and HCTZ at home.

## 2020-01-17 NOTE — ED PROVIDER NOTES
61-year-old female with nausea vomiting diarrhea shortness of breath. Patient had started with nausea and vomiting and constipation several days ago now she has diarrhea for the last 24 hours. Patient also has a history of congestive heart failure and COPD she is currently on oxygen 3 L around-the-clock at home. Patient has CHF. Patient has an extensive cardiac history most recent echo showed: SUMMARY: 
  
-  Left ventricle: The ventricle was moderately dilated. Systolic function  
was 
markedly reduced. Ejection fraction was estimated in the range of 20 % to 25  
%. There was severe diffuse hypokinesis. Wall thickness was mildly increased. Avg. 
E/e'= 31.9. Features were consistent with (grade 2 diastolic dysfunction).   
-  Right ventricle: The ventricle was dilated. Systolic function was reduced. Estimated peak pressure was in the range of 85-90 mmHg. 
  
-  Left atrium: The atrium was markedly dilated. 
  
-  Right atrium: The atrium was markedly dilated. 
  
-  Inferior vena cava, hepatic veins: The inferior vena cava was dilated. Respirophasic changes in dimension were absent. 
  
-  Aortic valve: A mechanical prosthesis was present. (2005).   
-  Mitral valve: There was moderate annular calcification. There was moderate 
regurgitation. 
  
-  Tricuspid valve: There was moderate to severe regurgitation. 
  
-  Pulmonic valve: There was mild to moderate regurgitation. Nausea This is a new problem. The current episode started more than 2 days ago. The problem occurs continuously. The problem has been gradually worsening. The emesis has an appearance of stomach contents. There has been no fever. Associated symptoms include abdominal pain and diarrhea. Pertinent negatives include no chills. Her past medical history is significant for DM. Past Medical History:  
Diagnosis Date  Anticoagulated on Coumadin 7/9/2013 S/P AVR  CAD (coronary artery disease) 1/20/2013 14 PCI LAD with stent placed  Cardiomy[facility] ([facility]  CHF (congestive heart failure) ([facility] 2017  CKD (chronic kidney disease) stage 3, GFR 30-59 ml/min ([facility]) 7/10/2013  COPD (chronic obstructive pulmonary disease) ([address] 2014  Diabetes ([facility]  Essential hypertension, benign 2013  HLD (hyperlipidemia)  ICD (implantable cardioverter-defibrillator) in place 10/2/2014 Biotronik single-chamber ICD implantation 10/20/14  Iron deficiency anemia due to chronic blood loss 2009  MDS (myelodysplastic syndrome) ([facility] 2011 Procrit started in 11 Procrit weekly and Iron stores. 12 good response to 3 weekly procrit did not need it last time  3-7-13 Pt doing well. Just wanted a \"check-up. \" Responding to Procrit every three weeks. 13 patient has missed some injections on recently restarted hemoglobin only issue , takes oral iron iron stores each time  REJI (obstructive sleep apnea)-cpap 2014  Osteoarthritis  Osteopenia  Pulmonary hypertension ([address] 6/15/2016  Quadrantanopia  Skin defect 2018  Visual complaint 2015 Past Surgical History:  
Procedure Laterality Date [address]    COLONOSCOPY N/A 2019 COLONOSCOPY/ BMI 40 ROOM 637 performed by Nataliia Schwartz MD at [address] [place] [address], [facility]  
 mechanical valve   HX  SECTION    
 HX CORONARY STENT PLACEMENT  May, 2014 STEMI with Stent placement.  HX ENDOSCOPY  2009 EGD [address] CATHETERIZATION    HX PACEMAKER  10/2/2014 Biotronik ICD  HX TUBAL LIGATION    
 IR BX BONE MARROW DIAGNOSTIC  2011 Family History:  
Problem Relation Age of Onset  Heart Disease Mother CHF  Hypertension Mother  Kidney Disease Mother  Heart Disease Father CHF  Lung Disease Father  Diabetes Father  Cancer Father   
     prostate  Hypertension Father  Heart Attack Father  Heart Disease Maternal Aunt  Diabetes Brother  Coronary Artery Disease Other  Breast Cancer Neg Hx Social History Socioeconomic History  Marital status:  Spouse name: Not on file  Number of children: Not on file  Years of education: Not on file  Highest education level: Not on file Occupational History Employer: RETIRED Comment: [employer] Social Needs  Financial resource strain: Not on file  Food insecurity:  
  Worry: Not on file Inability: Not on file  Transportation needs:  
  Medical: Not on file Non-medical: Not on file Tobacco Use  Smoking status: Former Smoker Packs/day: 0.25 Years: 42.00 Pack years: 10.50 Types: Cigarettes Start date: 10/1/1956 Last attempt to quit: 2014 Years since quittin.7  Smokeless tobacco: Never Used Substance and Sexual Activity  Alcohol use: No  
  Alcohol/week: 0.0 standard drinks  Drug use: No  
 Sexual activity: Not on file Lifestyle  Physical activity:  
  Days per week: Not on file Minutes per session: Not on file  Stress: Not on file Relationships  Social connections:  
  Talks on phone: Not on file Gets together: Not on file Attends Rastafarian service: Not on file Active member of club or organization: Not on file Attends meetings of clubs or organizations: Not on file Relationship status: Not on file  Intimate partner violence:  
  Fear of current or ex partner: Not on file Emotionally abused: Not on file Physically abused: Not on file Forced sexual activity: Not on file Other Topics Concern [address] Service Not Asked  Blood Transfusions Not Asked  Caffeine Concern Not Asked  Occupational Exposure Not Asked [place] Hazards Not Asked  Sleep Concern Not Asked  Stress Concern Not Asked  Weight Concern Yes  Special Diet Yes  Back Care Not Asked  Exercise Not Asked  Bike Helmet Not Asked  Seat Belt Not Asked  Self-Exams Not Asked Social History Narrative Lives with her daughter. Mando Luna and Alejandrina Jean Baptiste are caregivers. Ambulates only short distances. ALLERGIES: Ferrlecit [sodium ferric gluconat-sucrose]; Sulfa (sulfonamide antibiotics); and Aspirin Review of Systems Constitutional: Negative. Negative for activity change and chills. HENT: Negative. Eyes: Negative. Respiratory: Positive for shortness of breath. Cardiovascular: Negative. Gastrointestinal: Positive for abdominal pain, diarrhea and nausea. Genitourinary: Negative. Musculoskeletal: Negative. Skin: Negative. Neurological: Negative. Psychiatric/Behavioral: Negative. All other systems reviewed and are negative. Vitals:  
 01/17/20 1215 BP: 137/79 Pulse: 87 Resp: 16 Temp: 99.3 °F (37.4 °C) SpO2: 96% Weight: 94.3 kg (208 lb) Height: 5' 2\" (1.575 m) Physical Exam 
Vitals signs and nursing note reviewed. Constitutional:   
   General: She is not in acute distress. Appearance: She is well-developed. She is ill-appearing. She is not diaphoretic. HENT:  
   Head: Normocephalic and atraumatic. Right Ear: External ear normal.  
   Left Ear: External ear normal.  
   Nose: Nose normal.  
   Mouth/Throat:  
   Pharynx: No oropharyngeal exudate. Eyes:  
   General: No scleral icterus. Right eye: No discharge. Left eye: No discharge. Conjunctiva/sclera: Conjunctivae normal.  
   Pupils: Pupils are equal, round, and reactive to light. Neck: Musculoskeletal: Normal range of motion and neck supple. Vascular: No JVD. Trachea: No tracheal deviation. Cardiovascular:  
   Rate and Rhythm: Normal rate and regular rhythm. Pulmonary:  
   Effort: Accessory muscle usage and respiratory distress present. Breath sounds: No stridor. Examination of the left-upper field reveals wheezing. Wheezing present. Chest:  
   Chest wall: No tenderness. Abdominal:  
   General: Abdomen is protuberant. Bowel sounds are normal. There is no distension. Palpations: Abdomen is soft. There is no mass. Tenderness: There is generalized tenderness. Hernia: A hernia is present. Hernia is present in the ventral area. Musculoskeletal: Normal range of motion. General: No tenderness. Skin: 
   General: Skin is warm and dry. Coloration: Skin is not pale. Findings: No erythema or rash. Neurological:  
   Mental Status: She is alert and oriented to person, place, and time. Cranial Nerves: No cranial nerve deficit. Psychiatric:     
   Behavior: Behavior normal.     
   Thought Content: Thought content normal.  
 
  
 
MDM Procedures

## 2020-01-17 NOTE — ED NOTES
I have reviewed discharge instructions with the patient. The patient verbalized understanding. Patient left ED via Discharge Method: wheelchair to Home with daughter Opportunity for questions and clarification provided. Patient given 0 scripts. To continue your aftercare when you leave the hospital, you may receive an automated call from our care team to check in on how you are doing. This is a free service and part of our promise to provide the best care and service to meet your aftercare needs.  If you have questions, or wish to unsubscribe from this service please call [phone]. Thank you for Choosing our [facility] Emergency Department.

## 2020-01-17 NOTE — DISCHARGE INSTRUCTIONS
Patient Education        Diarrhea: Care Instructions  Your Care Instructions    Diarrhea is loose, watery stools (bowel movements). The exact cause is often hard to find. Sometimes diarrhea is your body's way of getting rid of what caused an upset stomach. Viruses, food poisoning, and many medicines can cause diarrhea. Some people get diarrhea in response to emotional stress, anxiety, or certain foods. Almost everyone has diarrhea now and then. It usually isn't serious, and your stools will return to normal soon. The important thing to do is replace the fluids you have lost, so you can prevent dehydration. The doctor has checked you carefully, but problems can develop later. If you notice any problems or new symptoms, get medical treatment right away. Follow-up care is a key part of your treatment and safety. Be sure to make and go to all appointments, and call your doctor if you are having problems. It's also a good idea to know your test results and keep a list of the medicines you take. How can you care for yourself at home? · Watch for signs of dehydration, which means your body has lost too much water. Dehydration is a serious condition and should be treated right away. Signs of dehydration are:  ? Increasing thirst and dry eyes and mouth. ? Feeling faint or lightheaded. ? A smaller amount of urine than normal.  · To prevent dehydration, drink plenty of fluids. Choose water and other caffeine-free clear liquids until you feel better. If you have kidney, heart, or liver disease and have to limit fluids, talk with your doctor before you increase the amount of fluids you drink. · Begin eating small amounts of mild foods the next day, if you feel like it. ? Try yogurt that has live cultures of Lactobacillus. (Check the label.)  ? Avoid spicy foods, fruits, alcohol, and caffeine until 48 hours after all symptoms are gone. ? Avoid chewing gum that contains sorbitol. ?  Avoid dairy products (except for yogurt with Lactobacillus) while you have diarrhea and for 3 days after symptoms are gone. · The doctor may recommend that you take over-the-counter medicine, such as loperamide (Imodium), if you still have diarrhea after 6 hours. Read and follow all instructions on the label. Do not use this medicine if you have bloody diarrhea, a high fever, or other signs of serious illness. Call your doctor if you think you are having a problem with your medicine. When should you call for help? Call 911 anytime you think you may need emergency care. For example, call if:    · You passed out (lost consciousness).     · Your stools are maroon or very bloody.    Call your doctor now or seek immediate medical care if:    · You are dizzy or lightheaded, or you feel like you may faint.     · Your stools are black and look like tar, or they have streaks of blood.     · You have new or worse belly pain.     · You have symptoms of dehydration, such as:  ? Dry eyes and a dry mouth. ? Passing only a little dark urine. ? Feeling thirstier than usual.     · You have a new or higher fever.    Watch closely for changes in your health, and be sure to contact your doctor if:    · Your diarrhea is getting worse.     · You see pus in the diarrhea.     · You are not getting better after 2 days (48 hours). Where can you learn more? Go to http://macrina-thuy.info/. Enter J680 in the search box to learn more about \"Diarrhea: Care Instructions. \"  Current as of: June 26, 2019  Content Version: 12.2  © 8428-8648 Healthwise, Inc[employer]. Care instructions adapted under license by [employer] (which disclaims liability or warranty for this information). If you have questions about a medical condition or this instruction, always ask your healthcare professional. [place] [address] any warranty or liability for your use of this information.          Patient Education        Nausea and Vomiting: Care Instructions  Your Care Instructions    When you are nauseated, you may feel weak and sweaty and notice a lot of saliva in your mouth. Nausea often leads to vomiting. Most of the time you do not need to worry about nausea and vomiting, but they can be signs of other illnesses. Two common causes of nausea and vomiting are stomach flu and food poisoning. Nausea and vomiting from viral stomach flu will usually start to improve within 24 hours. Nausea and vomiting from food poisoning may last from 12 to 48 hours. The doctor has checked you carefully, but problems can develop later. If you notice any problems or new symptoms, get medical treatment right away. Follow-up care is a key part of your treatment and safety. Be sure to make and go to all appointments, and call your doctor if you are having problems. It's also a good idea to know your test results and keep a list of the medicines you take. How can you care for yourself at home? · To prevent dehydration, drink plenty of fluids, enough so that your urine is light yellow or clear like water. Choose water and other caffeine-free clear liquids until you feel better. If you have kidney, heart, or liver disease and have to limit fluids, talk with your doctor before you increase the amount of fluids you drink. · Rest in bed until you feel better. · When you are able to eat, try clear soups, mild foods, and liquids until all symptoms are gone for 12 to 48 hours. Other good choices include dry toast, crackers, cooked cereal, and gelatin dessert, such as Jell-O. When should you call for help? Call 911 anytime you think you may need emergency care. For example, call if:    · You passed out (lost consciousness).    Call your doctor now or seek immediate medical care if:    · You have symptoms of dehydration, such as:  ? Dry eyes and a dry mouth. ? Passing only a little dark urine. ?  Feeling thirstier than usual.     · You have new or worsening belly pain.     · You have a new or higher fever.     · You vomit blood or what looks like coffee grounds.    Watch closely for changes in your health, and be sure to contact your doctor if:    · You have ongoing nausea and vomiting.     · Your vomiting is getting worse.     · Your vomiting lasts longer than 2 days.     · You are not getting better as expected. Where can you learn more? Go to http://macrina-thuy.info/. Enter 25 [id number] in the search box to learn more about \"Nausea and Vomiting: Care Instructions. \"  Current as of: June 26, 2019  Content Version: 12.2  © 3266-7418 [employer]. Care instructions adapted under license by [employer] (which disclaims liability or warranty for this information). If you have questions about a medical condition or this instruction, always ask your healthcare professional. [address] any warranty or liability for your use of this information.

## 2020-01-28 NOTE — PROGRESS NOTES
Arrived to the [facility]. Retacrit  
 completed. Provided education on Retacrit Patient instructed to report any side affects to ordering provider- Patient tolerated well Any issues or concerns during appointment:No 
Patient aware of next infusion appointment on Tuesday,March 10th @ 1100 Discharged home via wheelchair

## 2020-02-17 NOTE — PROGRESS NOTES
[facility] Provided education on Retacrit-patient has been receiving this for a while Patient instructed to report any side affects to ordering provider- Patient tolerated well Any issues or concerns during appointment: No 
Patient aware of next infusion appointment on Tuesday,March 10th @ 1100 Discharged home via wheelchair

## 2020-03-10 ENCOUNTER — APPOINTMENT (OUTPATIENT)
Dept: INFUSION THERAPY | Age: 70
End: 2020-03-10

## 2020-07-19 NOTE — PROGRESS NOTES
OT DISCHARGE REPORT    Time In: 0700  Time Out: 0740    COMPREHENSION MODE Initial Assessment Discharge Assessment 5/16/2018   Score 4 (Understands basic needs 75-89%, may need words repeated.) 6     EXPRESSION Initial Assessment Discharge Assessment 5/16/2018   Primary Mode of Expression Verbal Verbal   Score 6 7   Comments Mild difficulty with complex       SOCIAL INTERACTION/ PRAGMATICS Initial Assessment Discharge Assessment 5/16/2018   Score 5 7   Comments Needs supervision only under stressful or unfamiliar conditions, interacts appropriately 90% of the time, needs monitoring or encouragement for participation or interaction. PROBLEM SOLVING Initial Assessment Discharge Assessment 5/16/2018   Score 3 5   Comments Solves basic problems 50-74% of the time. basic problem sover 90% of the time. MEMORY Initial Assessment Discharge Assessment 5/16/2018   Score 4 7   Comments Recalled 2/3 words       EATING Initial Assessment Discharge Assessment 5/16/2018   Functional Level 1 5   Comments s/u assist with DME s/u assist     GROOMING Initial Assessment Discharge Assessment 5/16/2018   Functional Level 2 3   Tasks completed by patient Washed hands, Washed face, Brushed hair Washed face; Washed hands   Comments patient did 1 of 3 tasks able to wash face and partially wash hands     BATHING Initial Assessment Discharge Assessment 5/16/2018   Functional Level 1 3   Body parts patient bathed Chest, Abdomen Abdomen;Arm, left; Buttocks; Chest;Aura area; Thigh, left; Thigh, right   Comments patient required assist with 8 out of 10 parts moderate assist     TUB/SHOWER TRANSFER INDEPENDENCE Initial Assessment Discharge Assessment 5/16/2018   Score 4 5  Type of Shower: Shower  Adaptive  Equipment:Tub transfer bench and Grab bars   Comments not completed during evaluation as she sponge bathes at home close SBA     UPPER BODY DRESSING/UNDRESSING Initial Assessment Discharge Assessment 5/16/2018   Functional Level 1 3   Items applied/Steps completed Pullover (4 steps) Pullover (4 steps)   Comments Dependent, try to go through head hole instead of bottom able to get over head and left arm     LOWER BODY DRESSING/UNDRESSING Initial Assessment Discharge Assessment 5/16/2018   Functional Level 1 1   Items applied/Steps completed Underpants (3 steps), Elastic waist pants (3 steps) Sock, left (1 step); Elastic waist pants (3 steps); Sock, right (1 step); Underpants (3 steps)   Comments A for all parts       TOILETING Initial Assessment Discharge Assessment 5/16/2018   Functional Level 1 3   Comments assist all parts able to wipe and pull down pants     TOILET TRANSFER INDEPENDENCE Initial Assessment Discharge Assessment 5/16/2018   Transfer score 2 4   Comments maximal assist CGA     Plan of Care: Please see Care Plan for progress towards goals during stay  Precautions at Discharge: Falls NWB RUE 4-5 digits and LUE digits 3-4  Discharge Location: home with family  Safety/Supervision Recommendations/Functional Level:      Family Training: Completed on 5/15 with daughters    Recommended Continuing Therapy: Home Health OT  Residual Deficits: RUE non functional (3-4 years), BUE decreased FMC, balance      Summary of Session: S: \"I am ready to get home today. \" Agreeable to therapy. Focus of session was on morning ADL routine and discharge assessment. Patient was able to ambulate ~20 feet using no device. Pain not indicated during session. Patient tolerated session well. Patient ended session in recliner with call remote, breakfast,  and phone within reach.      Karyn Oconnor, OTR 19-Jul-2020 12:28

## 2020-12-28 NOTE — PROGRESS NOTES
Nutrition: BPA for EN/PN PTA. The patient was extubated earlier today. Her daughter is present at the bedside. When questioned about prior TF or TPN, the daughter reports that the patient was on TF during a 2014 admission while she required ventilator support. She reports that the FT was removed when the patient was extubated and she has had no difficulty with oral intake since that time. Follow-up based on standards of care. Yousif Ojeda. Radha Bermaner 
[phone] Short term refill was sent to pharmacy 12/18/2020. Please call patient to schedule VIRTUAL Chronic Disease Check appointment in the next month.

## 2021-05-19 NOTE — PROGRESS NOTES
Discharge instructions given to father  verbalized understanding pt is ambulatory out       Mirza Cast RN  05/19/21 [phone] Progress Note Patient: Oracio Tavarez MRN: [id number]  SSN: [id number] YOB: 1950  Age: 68 y.o. Sex: female Admit Date: 6/6/2019 LOS: 10 days Subjective:  
 
PMH significant for myelodisplastic syndrome, DM2, CKD stage 4, HTN, sHF, HLD, COPD, AVR on chronic coumadin. Admitted due to fatigue and found to be anemia more than her baseline and FELIZ. positive fecal occult blood. Patient is for EGD and colonoscopy on 6-. She was found to have duodenal mass with ulceration, that was biopsied. Also with nodular gastritis, internal hemorrhoids, signoid diverticulitis and polyp. Patient is feeling fine. No chest pain. No shortness of breath. Objective:  
 
Vitals:  
 06/16/19 3849 06/16/19 4540 06/16/19 0728 06/16/19 1110 BP:  101/52  104/57 Pulse:  70  72 Resp:  16  14 Temp:  97.6 °F (36.4 °C)  97.8 °F (36.6 °C) SpO2:  97% 94% 98% Weight: 96.7 kg (213 lb 1.6 oz) Height:      
  
 
Intake and Output: 
Current Shift: 06/16 0701 - 06/16 1900 In: 0 Out: 600 [Urine:600] Last three shifts: 06/14 1901 - 06/16 0700 In: 600 [P.O.:600] Out: 6510 [MATWF:6143] Physical Exam:  
 
General:                    The patient is a pleasant elderly female in no acute distress. She is lying flat. No complaints. Head:                                   Normocephalic/atraumatic. Eyes:                                   palpebral pallor, no scleral icterus. ENT:                                    External auricular and nasal exam within normal limits. Mucous membranes are moist. 
Neck:                                   Supple, non-tender, no JVD. Lungs:                       Clear to auscultation bilaterally without wheezes or crackles. No respiratory distress or accessory muscle use.  
Heart:                                  Regular rate and rhythm, without murmurs, rubs, or gallops. Abdomen:                  Soft, non-tender, non-distended with normoactive bowel sounds. Genitourinary:           No tenderness over the bladder or bilateral CVAs. Extremities:               Without clubbing, cyanosis, or edema. Skin:                                    Normal color, texture, and turgor. No rashes, lesions, or jaundice. Pulses:                      Radial and dorsalis pedis pulses present 2+ bilaterally. Capillary refill <2s. Neurologic:                CN II-XII grossly intact and symmetrical.  
                                            Moving all four extremities well with no focal deficits. Psychiatric:                Pleasant demeanor, appropriate affect. Alert and oriented x 3 Lab/Data Review: 
 
Recent Results (from the past 24 hour(s)) HGB & HCT Collection Time: 06/15/19 12:41 PM  
Result Value Ref Range HGB 8.4 (L) 11.7 - 15.4 g/dL HCT 27.7 (L) 35.8 - 46.3 % GLUCOSE, POC Collection Time: 06/15/19  4:07 PM  
Result Value Ref Range Glucose (POC) 125 (H) 65 - 100 mg/dL PTT Collection Time: 06/15/19  5:26 PM  
Result Value Ref Range aPTT 78.0 (H) 24.7 - 39.8 SEC  
GLUCOSE, POC Collection Time: 06/15/19  8:45 PM  
Result Value Ref Range Glucose (POC) 186 (H) 65 - 100 mg/dL CBC WITH AUTOMATED DIFF Collection Time: 06/15/19 11:30 PM  
Result Value Ref Range WBC 5.0 4.3 - 11.1 K/uL  
 RBC 2.75 (L) 4.05 - 5.2 M/uL HGB 7.8 (L) 11.7 - 15.4 g/dL HCT 26.1 (L) 35.8 - 46.3 % MCV 94.9 79.6 - 97.8 FL  
 MCH 28.4 26.1 - 32.9 PG  
 MCHC 29.9 (L) 31.4 - 35.0 g/dL  
 RDW 15.9 (H) 11.9 - 14.6 % PLATELET 517 (L) 526 - 450 K/uL MPV 11.2 9.4 - 12.3 FL ABSOLUTE NRBC 0.00 0.0 - 0.2 K/uL  
 DF AUTOMATED NEUTROPHILS 72 43 - 78 % LYMPHOCYTES 16 13 - 44 % MONOCYTES 9 4.0 - 12.0 % EOSINOPHILS 3 0.5 - 7.8 %  BASOPHILS 0 0.0 - 2.0 %  
 IMMATURE GRANULOCYTES 0 0.0 - 5.0 %  
 ABS. NEUTROPHILS 3.6 1.7 - 8.2 K/UL  
 ABS. LYMPHOCYTES 0.8 0.5 - 4.6 K/UL  
 ABS. MONOCYTES 0.4 0.1 - 1.3 K/UL  
 ABS. EOSINOPHILS 0.1 0.0 - 0.8 K/UL  
 ABS. BASOPHILS 0.0 0.0 - 0.2 K/UL  
 ABS. IMM. GRANS. 0.0 0.0 - 0.5 K/UL METABOLIC PANEL, BASIC Collection Time: 06/15/19 11:30 PM  
Result Value Ref Range Sodium 139 136 - 145 mmol/L Potassium 5.2 (H) 3.5 - 5.1 mmol/L Chloride 101 98 - 107 mmol/L  
 CO2 36 (H) 21 - 32 mmol/L Anion gap 2 (L) 7 - 16 mmol/L Glucose 113 (H) 65 - 100 mg/dL BUN 48 (H) 8 - 23 MG/DL Creatinine 1.96 (H) 0.6 - 1.0 MG/DL  
 GFR est AA 32 (L) >60 ml/min/1.73m2 GFR est non-AA 27 (L) >60 ml/min/1.73m2 Calcium 9.4 8.3 - 10.4 MG/DL  
PTT Collection Time: 06/15/19 11:30 PM  
Result Value Ref Range aPTT 61.7 (H) 24.7 - 39.8 SEC PROTHROMBIN TIME + INR Collection Time: 06/15/19 11:30 PM  
Result Value Ref Range Prothrombin time 21.2 (H) 11.7 - 14.5 sec INR 1.9 GLUCOSE, POC Collection Time: 06/16/19  6:55 AM  
Result Value Ref Range Glucose (POC) 117 (H) 65 - 100 mg/dL GLUCOSE, POC Collection Time: 06/16/19 11:03 AM  
Result Value Ref Range Glucose (POC) 120 (H) 65 - 100 mg/dL Current Facility-Administered Medications:  
  0.9% sodium chloride infusion 250 mL, 250 mL, IntraVENous, PRN, Cipriano Correa MD 
  warfarin (COUMADIN) tablet 2.5 mg, 2.5 mg, Oral, QPM, Tanya Monroe DO 
  lidocaine (XYLOCAINE) 10 mg/mL (1 %) injection 0.1 mL, 0.1 mL, SubCUTAneous, PRN, Zoran Wright MD 
  furosemide (LASIX) tablet 40 mg, 40 mg, Oral, DAILY, Tanya Monroe, , 40 mg at 06/16/19 1000   polyethylene glycol (MIRALAX) packet 17 g, 17 g, Oral, BID, Shanice Shelley MD, 17 g at 06/16/19 8476   patiromer calcium sorbitex (VELTASSA) powder 8.4 g, 8.4 g, Oral, DAILY, Rakesh Uribe MD, 8.4 g at 06/16/19 8828   ferrous gluconate 324 mg (38 mg iron) tablet 1 Tab, 1 Tab, Oral, BID WITH MEALS, [place] C, DO, 1 Tab at 06/16/19 1000   heparin 25,000 units in dextrose 500 mL infusion, 12-25 Units/kg/hr, IntraVENous, TITRATE, Tanya Monroe C, DO, Last Rate: 19.1 mL/hr at 06/16/19 0731, 10 Units/kg/hr at 06/16/19 0731 
  pantoprazole (PROTONIX) 40 mg in sodium chloride 0.9% 10 mL injection, 40 mg, IntraVENous, Q12H, Tanya Monroe, DO, 40 mg at 06/16/19 1000 
  albuterol (PROVENTIL VENTOLIN) nebulizer solution 2.5 mg, 2.5 mg, Nebulization, Q4H PRN, Balingit, Revkah, NP 
  carvedilol (COREG) tablet 6.25 mg, 6.25 mg, Oral, BID WITH MEALS, Balingit, Revkah, NP, 6.25 mg at 06/16/19 1000   sertraline (ZOLOFT) tablet 50 mg, 50 mg, Oral, DAILY, Balingit, Revkah, NP, 50 mg at 06/16/19 1000   simvastatin (ZOCOR) tablet 20 mg, 20 mg, Oral, QHS, Balingit, Revkah, NP, 20 mg at 06/15/19 2203   traZODone (DESYREL) tablet 50 mg, 50 mg, Oral, QHS, Balingit, Revkah, NP, 50 mg at 06/15/19 2204   vitamin E (AQUA GEMS) capsule 400 Units (Patient Supplied), 400 Units, Oral, DAILY, Balingit, Revkah, NP 
  sodium chloride (NS) flush 5-40 mL, 5-40 mL, IntraVENous, Q8H, Balingit, Revkah, NP, 10 mL at 06/16/19 0505   sodium chloride (NS) flush 5-40 mL, 5-40 mL, IntraVENous, PRN, Balingit, Revkah, NP 
  acetaminophen (TYLENOL) tablet 650 mg, 650 mg, Oral, Q4H PRN, Balingit, Revkah, NP, 650 mg at 06/16/19 1128 
  HYDROcodone-acetaminophen (NORCO) 5-325 mg per tablet 1 Tab, 1 Tab, Oral, Q4H PRN, Balingit, Revkah, NP 
  naloxone (NARCAN) injection 0.4 mg, 0.4 mg, IntraVENous, PRN, Belinda Irene, NP 
  ondansetron (ZOFRAN) injection 4 mg, 4 mg, IntraVENous, Q4H PRN, Teto, Belinda, NP 
  bisacodyl (DULCOLAX) tablet 10 mg, 10 mg, Oral, DAILY PRN, Belinda Irene, NP 
  budesonide (PULMICORT) 500 mcg/2 ml nebulizer suspension, 500 mcg, Nebulization, BID RT, 500 mcg at 06/16/19 0728 **AND** albuterol (PROVENTIL VENTOLIN) nebulizer solution 2.5 mg, 2.5 mg, Nebulization, Q6HWA RT, Belinda Irene NP, 2.5 mg at 06/16/19 4728   diphenhydrAMINE (BENADRYL) capsule 25 mg, 25 mg, Oral, Q4H PRN, Belinda Irene NP Assessment:  
 
Principal Problem: 
  Symptomatic anemia (6/6/2019) Active Problems: 
  MDS (myelodysplastic syndrome) ([facility] [address] (12/17/2011) Overview: Procrit started in August, 2011 12/18/11 Procrit weekly and Iron stores. 5-12 12-13-12 good response to 3 weekly procrit did not need it last time 3-7-13 Pt doing well. Just wanted a \"check-up. \" Responding to Procrit  
    every three weeks. 8-29-13 patient has missed some injections on recently restarted  
    hemoglobin only issue , takes oral iron iron stores each time Essential hypertension, benign (1/20/2013) CKD (chronic kidney disease) stage 4, GFR 15-29 ml/min ([facility]) (7/10/2013) COPD (chronic obstructive pulmonary disease) ([facility] [address] (4/2/2014) Overview: HOME OXYGEN 3 LITERS 
 
  HLD (hyperlipidemia) () 
 
  S/P AVR (aortic valve replacement) () Overview: Mechanical/Artific Type 2 diabetes mellitus with nephropathy ([facility] [address] (12/18/2017) Systolic CHF, acute on chronic ([facility] [address] (12/31/2018) Hyperkalemia (6/6/2019) Right shoulder pain (6/6/2019) Plan:  
  
Acute on chronic anemia From acute blood loss and chronic illness. Procrit is given on 6/7. Monitor. GI bleeding Received PRC transfusion. EGD and colonoscopy with result above. Pending biopsy result. Mechanical AVR Resumed warfarin. Pharmacology is helping. Will stop IV heparin when INR is in therapeutic range. INR is 1.9 today. MDS Monitoring. Hyperkalemia Started on [place] initially. K is improved. Monitor. Hypernatremia Resolved and improved. History of systolic CHF Continue current medications. She appears well compensated. DVT prophylaxis : Warfarin due to presence of mechanical heart valve already I have discussed the plan of care with patient. Signed By: Llewellyn Lundborg, MD   
 June 16, 2019

## 2021-10-13 NOTE — PROGRESS NOTES
Problem: Pressure Injury - Risk of  Goal: *Prevention of pressure injury  Document Bahman Scale and appropriate interventions in the flowsheet.    Outcome: Progressing Towards Goal  Pressure Injury Interventions:       Moisture Interventions: Maintain skin hydration (lotion/cream), Moisture barrier    Activity Interventions: Chair cushion, Increase time out of bed, Pressure redistribution bed/mattress(bed type), Assess need for specialty bed    Mobility Interventions: Chair cushion, HOB 30 degrees or less    Nutrition Interventions: Discuss nutritional consult with provider, Document food/fluid/supplement intake    Friction and Shear Interventions: Apply protective barrier, creams and emollients, HOB 30 degrees or less, Lift sheet, Lift team/patient mobility team, Transferring/repositioning devices Clear bilaterally, pupils equal, round and reactive to light.

## 2022-02-17 NOTE — PROCEDURES
Consent was obtained and benefits and risks explained to the patient. Mrs. Villalpando agreed to continue with the procedure of incision and drainage of left knee hematoma. The area was cleansed with betadine and a small portion of skin was removed on the medial and lateral portions of the fluid collection using sterile scissors. A large amount of serosanguinous fluid was evacuated and the wound was covered in 4x4, ABD, and tape. The patient tolerated the procedure and there were no complications.     
 
Arie Koyanagi, OMARI-BC 
 

no suicidal ideation

## 2022-09-01 NOTE — PROGRESS NOTES
Warfarin dosing per pharmacist 
 
Tony Fionaalvaro Maci Celestin is a 77 y.o. female. Height: 5' 2\" (157.5 cm)    Weight: 97.1 kg (214 lb) Indication: s/p aortic valve replacement Goal INR:  2-3 Home dose:  2.5 mg every day except Fridays when she takes 5 mg Risk factors or significant drug interactions:  none Other anticoagulants:  none Daily Monitoring Date  INR     Warfarin dose HGB              Notes 12/31  1.9  2.5 mg  8.5  
1/1  1.8  2.5 mg  8.9 Pharmacy has been consulted to dose warfarin this patient with a history of an aortic valve replacement. She presents this admission with a slightly subtherapeutic INR. INR subtherapeutic at 1.8 today. Will continue warfarin 2.5 mg daily for now. Patient may require an increase in dose tomorrow should the INR not increase. Daily INR. Will continue following. Thank you, Matteo Medina, PharmD, BCPS Clinical Pharmacist 
[phone] Erythromycin Pregnancy And Lactation Text: This medication is Pregnancy Category B and is considered safe during pregnancy. It is also excreted in breast milk.

## 2024-02-02 NOTE — PROGRESS NOTES
"Chief Complaint   Patient presents with    Annual Exam     Complete physical        Readings 9 in evening & can go up to 14 in PM    Last A1c  Hemoglobin A1C (%)   Date Value   07/13/2023 8.3 (H)     Generally using 15 units Lispro with meals and 20 units Lantus BID (1.29 units/kg)  Does sometimes get lows down to 3 in the AM but only happens 1-2x/mo      Patient Active Problem List    Diagnosis Date Noted    Type 1 diabetes mellitus without complication (CMD) 23/07/0561     Priority: Low        Current Outpatient Medications   Medication Sig Dispense Refill    Insulin Pen Needle 32G X 6 MM Misc Use to inject insulin 5 times daily. Remove needle cover(s) to expose needle before injecting. 150 each 5    insulin aspart (NovoLOG FlexPen) 100 UNIT/ML pen-injector Take 15 -18 units pre meal as per sliding scale. Prime 2 units before each dose. 15 mL 12    insulin detemir (LEVEMIR FLEXTOUCH) 100 UNIT/ML pen-injector Inject 20 Units into the skin in the morning and 20 Units in the evening. Prime 2 units before each dose. 15 mL 1     No current facility-administered medications for this visit.        ALLERGIES:  No Known Allergies      Habits/Social -   Social History     Tobacco Use   Smoking Status Never   Smokeless Tobacco Never          Health Care Maintenance -   Diet - admits sometimes not always watching the Novonics  Exercise - mostly sedentary at work  [employer] Readings from Last 4 Encounters:   02/02/24 66.7 kg (146 lb 15 oz)   07/13/23 64 kg (141 lb 1.5 oz)   09/26/22 64 kg (141 lb 1.5 oz)     Lab Results   Component Value Date    CHOLESTEROL 207 (H) 07/13/2023    CALCLDL 97 07/13/2023    HDL 82 07/13/2023    TRIGLYCERIDE 139 (H) 07/13/2023    NONHDL 125 07/13/2023    CHOHDL 2.5 07/13/2023      No results found for: ""CHOL\"", \""HDLC\"", \""LDLC\"", \""TRIG\""        ROS:  Constitutional: denies excessive fatigue, fevers, no unexpected weight changes  HEENT: negative  Respiratory: no cough, dyspnea, SOB at rest, wheeze  Cardiovascular: " SQBS 285. Spoke with Dr. Gwyn Reed to evaluate. "no exertional chest pains, palpitations, dizziness/LH  Gastrointestinal: no abd pains, GERD symptoms, hematochezia/melena  Genitourinary: no urgency/frequency, hematuria, dysuria  Musculoskeletal: no new or unusual symptoms  Neurologic: no TIA symptoms, no chronic headaches      Exam:  Visit Vitals  /68 (BP Location: LUE - Left upper extremity, Patient Position: Sitting, Cuff Size: Regular)   Pulse 80   Temp 98 Â°F (36.7 Â°C)   Resp 16   Ht 5' 10.67"" (1.795 m)   Wt 66.7 kg (146 lb 15 oz)   SpO2 100%   BMI 20.69 kg/mÂ²      Alert well-appearing WN/WD Male NAD  Conjunctiva pink, sclera clear and anicteric  No LAD. No thyromegaly or nodules. Chest CTA bilaterally. No wheeze or rales. Good air entry. RRR S1 S2 no murmurs, gallops or rubs. Abd soft, NT/ND, no masses, no HSM. Extr W/WP.  No C/C/E. distal pulses easily palpable         Assessment/Plan:    HME -   FLP, CMP, A1c, micro   Diet/exercise discussed   Eye exam      Electronically signed by: Robert Redd MD, 02/02/24 12:56 PM           "

## 2024-03-08 NOTE — PROGRESS NOTES
Arrived to the [facility] ambulatory with her daughter. Procrit inj completed. Patient tolerated well. Any issues or concerns during appointment: no.  Patient aware of next infusion appointment on 11/30 at 1600. Discharged to home ambulatory. Heterosexual

## 2024-12-13 NOTE — ED PROVIDER NOTES
49-year-old female presenting after what sounds like a syncopal episode. She remembers getting up and using her walker to go to her bedroom. She then has avoided in her memory remembers waking up on her bed. Her granddaughter found her in an altered position on the bed staring off into space and not responding to questions but still breathing. Actually 2 or 3 minutes later the patient's mental status cleared up and she woke up. She ambulated since then trending a little bit that she seemed overly sleepy so they brought her in for further evaluation. She has a bruise on her left arm and some swelling on the left side of her face. External states that she has no complaints and doesn't have any chest pain, increased shortness of breath, abdominal pain or pain in any of her extremities. She has some chronic weakness in her right upper extremity from a prior stroke. She has nearly passed out before but never completely passed out. The history is provided by the patient. Lethargy   This is a new problem. The current episode started 1 to 2 hours ago. The problem has been resolved. Pertinent negatives include no chest pain, no abdominal pain, no headaches and no shortness of breath. Nothing aggravates the symptoms. Nothing relieves the symptoms.         Past Medical History:   Diagnosis Date    Anticoagulated on Coumadin 7/9/2013    S/P AVR     CAD (coronary artery disease) 1/20/2013 5/8/14 PCI LAD with stent placed     Cardiomyopathy ([address]     CHF (congestive heart failure) ([address] 2/17/2017    CKD (chronic kidney disease) stage 3, GFR 30-59 ml/min ([facility]) 7/10/2013    COPD (chronic obstructive pulmonary disease) ([address] 4/2/2014    Diabetes ([address]     Essential hypertension, benign 1/20/2013    HLD (hyperlipidemia)     ICD (implantable cardioverter-defibrillator) in place 10/2/2014    [employer] single-chamber ICD implantation 10/20/14     Iron deficiency anemia due to chronic blood loss 7/29/2009    MDS (myelodysplastic syndrome) ([facility] 2011    Procrit started in 11 Procrit weekly and Iron stores. 12 good response to 3 weekly procrit did not need it last time  3--13 Pt doing well. Just wanted a \"check-up. \" Responding to Procrit every three weeks. 13 patient has missed some injections on recently restarted hemoglobin only issue , takes oral iron iron stores each time       REJI (obstructive sleep apnea)-cpap 2014    Osteoarthritis     Osteopenia     Pulmonary hypertension ([facility] 6/15/2016    Quadrantanopia     Skin defect 2018    Visual complaint 2015       Past Surgical History:   Procedure Laterality Date    CARDIAC CATHETERIZATION      COLONOSCOPY N/A 2019    COLONOSCOPY/ BMI 40 ROOM 637 performed by Rita Correa MD at [address] [place] [address], [facility]    mechanical valve     HX  SECTION      HX CORONARY STENT PLACEMENT  May, 2014    STEMI with Stent placement.     HX ENDOSCOPY  2009    EGD    HX HEART CATHETERIZATION      HX PACEMAKER  10/2/2014    Biotronik ICD    HX TUBAL LIGATION      IR BX BONE MARROW DIAGNOSTIC  2011         Family History:   Problem Relation Age of Onset    Heart Disease Mother         CHF    Hypertension Mother     Kidney Disease Mother     Heart Disease Father         CHF    Lung Disease Father     Diabetes Father     Cancer Father         prostate    Hypertension Father     Heart Attack Father     Heart Disease Maternal Aunt     Diabetes Brother     Coronary Artery Disease Other     Breast Cancer Neg Hx        Social History     Socioeconomic History    Marital status:      Spouse name: Not on file    Number of children: Not on file    Years of education: Not on file    Highest education level: Not on file   Occupational History     Employer: RETIRED     Comment: deli   Social Needs    Financial resource strain: Not on file    Food insecurity:     Worry: Not on file     Inability: Not on file    Transportation needs:     Medical: Not on file     Non-medical: Not on file   Tobacco Use    Smoking status: Former Smoker     Packs/day: 0.25     Years: 42.00     Pack years: 10.50     Types: Cigarettes     Start date: 10/1/1956     Last attempt to quit: 2014     Years since quittin.2    Smokeless tobacco: Never Used   Substance and Sexual Activity    Alcohol use: No     Alcohol/week: 0.0 standard drinks    Drug use: No    Sexual activity: Not on file   Lifestyle    Physical activity:     Days per week: Not on file     Minutes per session: Not on file    Stress: Not on file   Relationships    Social connections:     Talks on phone: Not on file     Gets together: Not on file     Attends Anabaptism service: Not on file     Active member of club or organization: Not on file     Attends meetings of clubs or organizations: Not on file     Relationship status: Not on file    Intimate partner violence:     Fear of current or ex partner: Not on file     Emotionally abused: Not on file     Physically abused: Not on file     Forced sexual activity: Not on file   Other Topics Concern     Service Not Asked    Blood Transfusions Not Asked    Caffeine Concern Not Asked    Occupational Exposure Not Asked   Atul Guile Hazards Not Asked    Sleep Concern Not Asked    Stress Concern Not Asked    Weight Concern Yes    Special Diet Yes    Back Care Not Asked    Exercise Not Asked    Bike Helmet Not Asked    Seat Belt Not Asked    Self-Exams Not Asked   Social History Narrative    Lives with her daughter. Navid Beyer and Bhanu Mota are caregivers. Ambulates only short distances. ALLERGIES: Ferrlecit [sodium ferric gluconat-sucrose]; Sulfa (sulfonamide antibiotics); and Aspirin    Review of Systems   Respiratory: Negative for shortness of breath. Cardiovascular: Negative for chest pain.    Gastrointestinal: Negative for abdominal dyspnea on exertion pain.   Neurological: Positive for syncope. Negative for headaches. Hematological: Bruises/bleeds easily. All other systems reviewed and are negative. Vitals:    07/28/19 1829 07/28/19 1859 07/28/19 1952 07/28/19 1958   BP: 126/56 121/59  117/58   Pulse: 73 74 71 70   Resp: 13 15  14   Temp:       SpO2: 93% 94%  99%   Weight:       Height:                Physical Exam   Constitutional: She is oriented to person, place, and time. She appears well-developed and well-nourished. HENT:   Head: Normocephalic. Moderate swelling along the left side of her face   Eyes: Pupils are equal, round, and reactive to light. Conjunctivae and EOM are normal.   Bilateral blepharitis   Neck: Normal range of motion. Neck supple. Cardiovascular: Normal rate and regular rhythm. Pulmonary/Chest: Effort normal. No respiratory distress. She has wheezes. Abdominal: Soft. Bowel sounds are normal.   Musculoskeletal: Normal range of motion. Fist size bruise in the left outer arm   Neurological: She is alert and oriented to person, place, and time. Skin: Skin is warm and dry. Nursing note and vitals reviewed. MDM  Number of Diagnoses or Management Options  Acute on chronic combined systolic and diastolic congestive heart failure [facility]):   Syncope and collapse:   Diagnosis management comments: 75-year-old female presenting after a syncopal episode. She has multiple medical issues we will get an EKG, head CT, basic labs and urinalysis. My suspicion is this was a vasovagal episode as the patient had been sitting for quite some time stood up and while ambulating to her bedroom lost consciousness and then returned to normal consciousness without any sensation of chest pain, nausea, vomiting or palpitations.        Amount and/or Complexity of Data Reviewed  Clinical lab tests: ordered and reviewed (Results for orders placed or performed during the hospital encounter of 07/28/19  -TROPONIN I       Result Value             Ref Range           Troponin-I, Qt.             0.07 (H)          0.02 - 0.05 *  -CBC WITH AUTOMATED DIFF       Result                      Value             Ref Range           WBC                         7.1               4.3 - 11.1 K*       RBC                         3.03 (L)          4.05 - 5.2 M*       HGB                         8.7 (L)           11.7 - 15.4 *       HCT                         30.4 (L)          35.8 - 46.3 %       MCV                         100.3 (H)         79.6 - 97.8 *       MCH                         28.7              26.1 - 32.9 *       MCHC                        28.6 (L)          31.4 - 35.0 *       RDW                         15.8 (H)          11.9 - 14.6 %       PLATELET                    151               150 - 450 K/*       MPV                         11.8              9.4 - 12.3 FL       ABSOLUTE NRBC               0.00              0.0 - 0.2 K/*       DF                          AUTOMATED                             NEUTROPHILS                 77                43 - 78 %           LYMPHOCYTES                 12 (L)            13 - 44 %           MONOCYTES                   9                 4.0 - 12.0 %        EOSINOPHILS                 1                 0.5 - 7.8 %         BASOPHILS                   1                 0.0 - 2.0 %         IMMATURE GRANULOCYTES       1                 0.0 - 5.0 %         ABS. NEUTROPHILS            5.5               1.7 - 8.2 K/*       ABS. LYMPHOCYTES            0.8               0.5 - 4.6 K/*       ABS. MONOCYTES              0.6               0.1 - 1.3 K/*       ABS. EOSINOPHILS            0.1               0.0 - 0.8 K/*       ABS. BASOPHILS              0.1               0.0 - 0.2 K/*       ABS. IMM.  GRANS.            0.1               0.0 - 0.5 K/*  -METABOLIC PANEL, COMPREHENSIVE       Result                      Value             Ref Range           Sodium                      141               136 - 145 mm* Potassium                   4.3               3.5 - 5.1 mm*       Chloride                    97 (L)            98 - 107 mmo*       CO2                         36 (H)            21 - 32 mmol*       Anion gap                   8                 7 - 16 mmol/L       Glucose                     142 (H)           65 - 100 mg/*       BUN                         53 (H)            8 - 23 MG/DL        Creatinine                  2.20 (H)          0.6 - 1.0 MG*       GFR est AA                  28 (L)            >60 ml/min/1*       GFR est non-AA              23 (L)            >60 ml/min/1*       Calcium                     9.1               8.3 - 10.4 M*       Bilirubin, total            0.6               0.2 - 1.1 MG*       ALT (SGPT)                  20                12 - 65 U/L         AST (SGOT)                  18                15 - 37 U/L         Alk.  phosphatase            72                50 - 136 U/L        Protein, total              7.1               6.3 - 8.2 g/*       Albumin                     3.4               3.2 - 4.6 g/*       Globulin                    3.7 (H)           2.3 - 3.5 g/*       A-G Ratio                   0.9 (L)           1.2 - 3.5      -CK       Result                      Value             Ref Range           CK                          29                21 - 215 U/L   -URINALYSIS W/ RFLX MICROSCOPIC       Result                      Value             Ref Range           Color                       YELLOW                                Appearance                  CLOUDY                                Specific gravity            1.013             1.001 - 1.02*       pH (UA)                     5.0               5.0 - 9.0           Protein                     TRACE (A)         NEG mg/dL           Glucose                     NEGATIVE          mg/dL               Ketone                      NEGATIVE          NEG mg/dL           Bilirubin                   NEGATIVE          NEG Blood                       NEGATIVE          NEG                 Urobilinogen                1.0               0.2 - 1.0 EU*       Nitrites                    NEGATIVE          NEG                 Leukocyte Esterase          NEGATIVE          NEG                 RBC                         0-3               0 /hpf              Epithelial cells            0-3               0 /hpf              Bacteria                    TRACE             0 /hpf              Casts                       HYALINE           0 /lpf              Amorphous Crystals          1+ (H)            0              -BNP       Result                      Value             Ref Range           BNP                         1,354 (H)         0 pg/mL        -POC G3       Result                      Value             Ref Range           Device:                     NASAL CANNULA                         pH (POC)                    7.211 (LL)        7.35 - 7.45         pCO2 (POC)                  99.5 (HH)         35 - 45 MMHG        pO2 (POC)                   70 (L)            75 - 100 MMHG       HCO3 (POC)                  39.9 (H)          22 - 26 MMOL*       sO2 (POC)                   88 (L)            95 - 98 %           Base excess (POC)           9                 mmol/L              Allens test (POC)           YES                                   Site                        LEFT RADIAL                           Patient temp.                98.6                                  Specimen type (POC)         ARTERIAL                              Performed by                                                  [facility]       CO2, POC                    43                MMOL/L              Flow rate (POC)             3.000             L/min               Respiratory comment:                                          PhysicianNotified       COLLECT TIME                1,935                            -EKG, 12 LEAD, INITIAL       Result Value             Ref Range           Ventricular Rate            78                BPM                 Atrial Rate                 78                BPM                 P-R Interval                180               ms                  QRS Duration                150               ms                  Q-T Interval                426               ms                  QTC Calculation (Bezet)     485               ms                  Calculated P Axis           36                degrees             Calculated R Axis           -50               degrees             Calculated T Axis           110               degrees             Diagnosis                                                     Normal sinus rhythm   Left axis deviation   Left bundle branch block   Abnormal ECG   When compared with ECG of 06-JUN-2019 16:10,   Premature ventricular complexes are no longer Present       *Note: Due to a large number of results and/or encounters for the requested time period, some results have not been displayed. A complete set of results can be found in Results Review.  )  Tests in the radiology section of CPT®: ordered and reviewed (Xr Chest Port    Result Date: 7/28/2019  AP chest radiograph History: syncope, 68 years Female Comparison: Chest radiograph June 07, 2019 Findings:  Cardiac pacing device obscures part of the left chest wall, single lead appears to remain in anatomic position. Normal cardiomediastinal silhouette with evidence of CABG. Persistent mild hyperinflation suggestive of COPD. Nonspecific mild diffuse interstitial prominence appears slightly increased since prior, may represent edema in the appropriate clinical setting. Trace bilateral pleural effusions appear slightly increased since prior. Mild subsegmental atelectasis bilateral lung bases. No evidence of pneumothorax. Visualized soft tissue and osseous structures otherwise unremarkable.       Impression:  Suspect worsening mild interstitial edema.     )  Tests in the medicine section of CPT®: ordered and reviewed  Decide to obtain previous medical records or to obtain history from someone other than the patient: yes  Discuss the patient with other providers: yes (Discussed with hospitalist on call who will assume care)  Independent visualization of images, tracings, or specimens: yes    Risk of Complications, Morbidity, and/or Mortality  Presenting problems: moderate  Diagnostic procedures: high  Management options: high  General comments: I personally reviewed the patient's vital signs, laboratory tests, and/or radiological findings. I discussed these findings with the patient and their significance. I answered all questions and explained that given these findings there is significant concern for increased morbidity and/or mortality without immediate intervention. As a result, I recommended admission to the hospital, consulted the appropriate service, and transitioned care to that service in improved condition      Patient Progress  Patient progress: improved    ED Course as of Jul 28 2016   Sun Jul 28, 2019   1656 EKG was performed upon arrival shows a left bundle branch block at a rate of 70    [JS]   1657 This is unchanged from June    [JS]   1658 Vital signs are normal except for mild hypertension    [JS]   1722 Hemoglobin is at her baseline, white blood cells and platelets are normal    [JS]   1759 Patient has a chronically slightly elevated troponin    [JS]   1800 Slight elevation in creatinine. Treating with 500 cc of fluid    [JS]   1811 Urinalysis is clear. [JS]   1843 Nurse keeps expressing concern over the patient's respiratory status but every time I have evaluated the patient she is resting comfortably with a normal waveform and normal O2 sats on her 3 L.    [JS]   1901 Chest x-ray consistent with mild worsening interstitial edema.   Symptoms most consistent with a setting of syncope with known terrible congestive heart failure in acute exacerbation with elevated creatinine. Patient probably not stable enough for GI lab tomorrow. We will have the pacemaker interrogated and have the patient admitted for syncope office and CHF exacerbation treatment    [JS]   1916 Just contacted the [employer] representative who will come and interrogate the patient's pacemaker. [JS]   1916 Given the patient's chest x-ray findings I think that her creatinine may be more of a result of fluid overload versus mild dehydration. [JS]   1937 Patient was evaluated by [employer] representative to get a report from the patient's device which was unremarkable    [JS]   1943 Reviewed the patient's head CT and it appears unremarkable    [JS]   1944 Chronically elevated BNP not much different from her normal levels. [JS]   393 S[facility] given that she has multiple issues concurrently. [JS]   2016 Patient's blood gas shows some CO2 retention. We are treating with a breathing treatment at the moment. Work to set the patient up to see if we get her to ventilate better. She may need BiPAP ultimately that her mental status is still clear.     [JS]      ED Course User Index  [JS] Renetta Mireles MD       Procedures

## 2025-05-20 NOTE — ROUTINE PROCESS
Respiratory Care Services Policy Number: -[id number]    Title: Aerosolized Medication Protocol    Effective Date: 10/1998    Revised Date: 06/13, 03/16, 11/17, 07/19     Reviewed Date: 05/14/ 03/15 , 06/17, 5/18   I. Policy: The Aerosolized Medication Protocol shall by implemented by Respiratory Care Practitioners (RCP) for patients with orders to receive aerosol therapy with medication. II. Purpose: To open and maintain obstructed airways, the RCP, will utilize the following   protocol to select the indicated aerosolized medication(s) and determine the most effective method of delivery to the patient. III. Patient Type: All patients who are determined to meet aerosolized medication criteria as          outlined in this protocol. IV. Responsibility: Director, [address], registered Respiratory Care Practitioners (RCP's) with documented competency in the performance of respiratory therapeutic techniques. V. Equipment needed:  A. Stethoscope  B. Pulse oximeter  C. AeroEclipse nebulizer  D. Dry Powder Inhaler (DPI)     VI. Protocol:   A. The following conditions are accepted indications for aerosolized medication therapy. 1. Bronchospasm/wheezing  2. Impaired mucociliary clearance  3. Tracheobronchial mucosal congestion/and laryngeal stridor  4. Diseases which commonly require aerosolized medication therapy include, but are not limited to:  a. Asthma/reactive airway disease  b. Bronchitis/emphysema (COPD)  c. Cystic fibrosis  d. Severe laryngitis/tracheitis  e. Bronchiectasis  f. Smoke inhalation or chemical trauma to the lung or upper airway  g. Physical trauma to the upper airway  h. Laryngotracheobronchitis  i. Bronchiolitis  j. Non-specific wheezing              B. Indications for bronchodilator medications will include:  a. Bronchospasm/ wheezing  b. Asthma/reactive airway disease  c. Chronic obstructive pulmonary disease  d.  Obstructive defect on pulmonary function testing  C. Administration of medications  1. If a bronchodilator or any other type of respiratory medication is needed, a physician order must be indicated in the medication section in the patient's EMR. 2. When the physician specifies the medication and dosage at the time of request, the ordered medication will be used as part of the care plan. D. The following guidelines will be utilized in the evaluation and selection of the appropriate delivery device for indicated medication(s):  1. Unassisted aerosol (UA) is the preferred method of aerosol delivery and indicated if  a. Ventilation is inadequate  b. Patient demonstrates wheezing   c. Routine treatments shall be given via the AeroEclipse nebulizer. d. The Aerogen nebulizer shall be used in the following circumstances:  i. ER patients and they will continue with this nebulizer if admitted to 8th floor or ICU.  ii. Patients in ICU   iii. Patients on 8th floor with severe wheezing (at the RCP's discretion)  2. Dry PowderInhaler (DPI)   a. Patient should be alert/cooperative  b. Able to perform 3 second breath hold. c. Patient has used DPI therapy previously, either at home or in the hospital.  d. Note: The only approved inhaler on formulary is Spiriva. VII. Guidelines:   Monitor patient's vital signs and evaluate patient's clinical status. The need to change medication and/or modality may be indicated by:  1. A pulse greater than 120 bpm, or if a pulse increase of 20 bpm occurs with bronchodilator medications. 2. Significant worsening of dyspnea or wheezing occurring during or within 30 minutes of discontinuing therapy. 3. Worsening of patient's sensorium (e.g. patient becomes confused or obtunded, and unable to follow directions). 4. Worsening of patient's chest x-ray. 5. Change in sputum (e.g. increased pulmonary infiltrate, which might indicate need for volume expansion therapy).   6. Patient has difficulty coughing up secretions, which might indicate need for acetylcysteine and/or bronchial hygiene therapy. 7. Call physician immediately if dyspnea worsens and is not responsive to modifications allowed by protocol. VIII. Clinical Responsibility:  1. The therapy assessment guidelines will be used to evaluate all patients receiving aerosolized medications with the exception of critical care areas. 1. RCP's will perform changes in therapy per protocol. 2. It will be the responsibility of RCP to provide instruction regarding respiratory medications, possible side effects, aerosol therapy and proper DPI technique, as well as, spacer usage to patients ordered DPI therapy. 3. Current therapy that is part of a patient's home regimen will not be discontinued. a. Provide spacer and educate patient on proper inhaler technique if needed. IX. Documentation  A. Document assessment findings in the respiratory assessment section of the patient's EMR. B. Document changes in therapy per protocol in the respiratory orders section and in the care plan section of the patient's EMR. C. Document patient education in the patient education section of the patient's EMR. X. Outcome Criteria:  A. Relief of wheezes and obstruction  B. Improved cough and sputum color and consistency  C. Improved chest x-ray  D. Improved arterial oxygen tension and or SaO2  E. Improved Peak Flow on asthmatic patients        XI. Related Protocols:  A. Respiratory Patient Care Protocols  B. Bronchial Hygiene Therapy  C. Oxygen Protocol    Reference:  L - Respiratory Care Department Policy, Procedure and Protocol Guideline Manual, 1995, JORGE LUIS Matthews. L -  Therapist Driven Respiratory Care Protocols  A Practitioner's Guide for Criteria-Based Respiratory Care by Ngozi Powell M.D., and JORGE LUIS Temple RRT. L - The rationale for therapist-driven protocols: an update. Respiratory Care 1998; [id number]. N -[facility] Clinical Practice Guidelines.                       Respiratory Care Services       Policy Number: -[id number]    Title: Patient Care Assessment Protocol    Effective Date: 01/1999    Revised Date: 05/2014, 04/2018, 12/2018, 07/2019    Reviewed Date: 06/2013/ 03/2015, 03/2016, 06/2017        Overview  In an effort to improve quality and reduce costs of respiratory care at [facility], the Respiratory Department has developed a number of Patient Care Protocols. These protocols have been developed according to Perez 3 and are utilized for those patients who are ordered respiratory therapy using therapeutic indications and standardized approaches for accomplishing objectives. Patient Care Protocols are intended to improve care by:   Defining the indications and standards of care agreed upon by the Pulmonary Medicine and [address] [place] [address] of [facility].  Training respiratory care practitioners to apply those criteria to individual patients and modify therapy as indicated by the protocols.  Documenting the indication and care plan as part of the initial ordering process.  Tapering or discontinuing treatments once the indication for therapy changes. The Patient Care Protocols shall be universally applied throughout the hospital as determined by   the Pulmonary Medicine and [address] [place] [address]. Rationale for Patient Care Assessment Protocols:   Continuous Quality Improvement   Cost containment   Standardization of care   Enhanced continuity of care   Utilization review   Timely intervention    The following patient care assessment protocols have been developed:   Aerosolized Medication Protocol [email or web address]://[email or web address]/stancis/policies/Respiratory%20Care%20Services%20Policies/-[id number]. doc    Bronchial Hygiene Protocol http://[email or web address]/gvl/stfrancis/policies/Respiratory%20Care%20Services%20Policies/-[id number]. doc   Oxygen Protocolhttp://kayleethang/[facility]/stfrancis/policies/Respiratory Care Services Policies/-[id number]. doc http://spb/[facility]/stfrancis/policies/Respiratory%20Care%20Services%20Policies/-[id number]. doc   CVRU Fast Track Weaning Protocol http://[email or web address]/localHometicastems/[facility]/stfrancis/policies/Respiratory%20Care%20Services%20Policies/-[id number]. doc    Asthma Treatment Protocol ERhttp://alok/[facility]/stfrancis/policies/Respiratory Care Services Policies/-[id number]. doc http://kayleeb/[facility]/stfrancis/policies/Respiratory%20Care%20Services%20Policies/-[id number]. doc   Pediatric Asthma Treatment Protocol ERhttp://alok/[facility]/stfrancis/policies/Respiratory Care Services Policies/-[id number]. doc http://kayleethang/[facility]/stfrancis/policies/Respiratory%20Care%20Services%20Policies/-[id number]. doc   Alpha-1 Antitrypsin Deficiency Protocolhttp://alok/[facility]/stfrancis/policies/Respiratory Care Services Policies/-[id number]. doc http://kayleeb/[facility]/stfrancis/policies/Respiratory%20Care%20Services%20Policies/-[id number]. doc   Prone Positioning Protocol http://kayleeb/[facility]/stfrancis/policies/Respiratory%20Care%20Services%20Policies/-[id number]. doc   COPD Protocol http://kayleeb/localHometicaPhoenixs/[facility]/stfrancis/policies/Respiratory%20Care%20Services%20Policies/-[id number]. doc   Home Oxygen Assessment Protocolhttp://kayleethang/[facility]/lauren/policies/Respiratory Care Services Policies/-[id number]. doc http://[facility]leeSt[facility]/stancis/policies/Respiratory%20Care%20Services%20Policies/-[id number]. doc   Ventilator Weaning Protocol http://[facility]leeSt. [facility]/stancis/policies/Respiratory%20Care%20Services%20Policies/-[id number]. doc   Lung Volume Expansion Protocolhttp://kayleeSt. [facility]/lauren/policies/Respiratory Care Services Policies/-[id number]. doc http://[facility]/gvl/stfrancis/policies/Respiratory%20Care%20Services%20Policies/-[id number]. docm    The Director of [address] [place] [address] oversees the Patient Care Assessment Program. The Respiratory Educator is responsible for protocol development and training. The Supervisor is responsible for implementation and  activities. Each patient with an order for respiratory treatments will receive an evaluation. Respiratory Care Practitioners (RCP's) will perform the evaluations. The same evaluation tool will be utilized for initial and follow-up assessments. If the patient does not meet criteria for ordered therapy, the therapy will be discontinued. If the patient demonstrates an adverse response to initially ordered therapy, the therapy will be discontinued and the physician will be contacted. Specific physician's orders that deviate from protocols and are deemed \"inappropriate\" or \"unsafe\" will be addressed with ordering physician and/or medical director as required. Respiratory Patient Care Assessment Protocols    I. Policy: In an effort to provide quality patient care and effective utilization of services, physicians who order respiratory therapy will have their patients treated via the protocols established (see attached) Respiratory Care Practitioners (RCP's) will complete the initial assessment which will indicate patient needs,  the care plan developed and will performed within 24 hours of admission. Frequency of the therapy will be set according to the results of the respiratory therapy evaluation and frequency guidelines policy. Reassessment will be continued every 48 hours and more frequently as needed for the individual patient. II. Purpose:     E.  To provide a process that will allow for ongoing assessment and care plan modification for patients receiving respiratory services based on both objective and or subjective patient responses to interventions. This process of protocol utilization will assist in patient care progression while eliminating the need for the physician to continually update respiratory therapy orders. F. To assure continuity of respiratory care that meets [facility] Clinical Practice Guidelines. III. Initiation:  Implement Respiratory Care Protocols for patients who are ordered by physician          to receive respiratory therapy procedures or for ventilator management. IV. Protocol:  B. Upon receiving an order for therapy the RCP will review the patient's EMR (electronic medical record) for all pertinent information includin. Physician's order for therapy  6. Patient history and physical examination  7. Physician progress notes  8. Diagnostic. X-rays, PFT's, arterial blood gases etc.  C. The RCP will perform a respiratory assessment in the following manner:  3. General observations: color, pattern and effort of breathing, chest expansion, (symmetrical and bilateral), level of consciousness and the ability to ambulate. 4. The RCP will assess patient's cough ability and determine if bronchial hygiene is needed. If patient is unable to produce sputum, at that time, the RCP should question the patient with regard to their sputum: production, color consistency, frequency and amount. 5. Auscultation: Using a stethoscope, the RCP will listen and note quality of breath sounds and presence or absence of adventitious breath sounds in all lung fields, both anteriorly and posteriorly. 6. Upon completing the EMR review and physical assessment, the RCP will document findings in the RT Assessment section of the EMR. The score level will be provided and will be used to determine the frequency of therapy. V. Indications:   A. Bronchial Hygiene Protocol indications:   2. Potential for or presence of atelectasis. 1. Need for hydration and removal of retained secretions.   1. Need for improvement of cough effectiveness. 1. Presence of conditions associated with disorder of pulmonary clearance:  k. Cystic fibrosis  l. Bronchiectasis  m. Neuromuscular disease  n. Obstructive lung diseases  o. Restrictive lung diseases   Aerosolized Medication(s) Protocol indications:Treatment of bronchospasm/wheezing  9. Improvement of mucociliary clearance  10. Treatment of stridor  11. History of Bronchiectasis, Asthma or COPD  C. Oxygen Therapy Protocol indications:   4. Documented hypoxemia  5. Severe trauma  6. Acute myocardial infarction  7. Short-term therapy (e.g. post anesthesia recovery)  D. [facility] Ventilator Weaning Protocol indications:  1. All mechanically ventilated surgical patients unless they have a no wean order. E. Asthma Treatment Protocol ER indications:  1. Patients 15years of age and older that have been triaged or diagnosed with   asthma exacerbation shall be indicated for the ER Asthma Treatment Protocol. A physician order will be required to initiate the protocol. F. Pediatric Asthma protocol in the ER indications:  1. Patients less than 15years old that have been triaged or diagnosed with asthma exacerbation shall be indicated for the Pediatric Asthma protocol. A physician order will be required to initiate the protocol. G. Alpha-1 Antitrypsin Deficiency Testing protocol indications:         1. Patients admitted and diagnosed with COPD. H. Prone Positioning Protocol indications:         1. Acute lung injury         2. Acute respiratory distress syndrome (ARDS)   I. Respiratory Care COPD Protocol indications:         1. History of COPD in patient's records         2. Smoking history   J. Home Oxygen Assessment Protocol indications:         1. Chronic lung disease         2. Cor pulmonale         3. Unable to wean to room air 48 hours prior to anticipated discharge. K.  Ventilator Weaning Protocol indications:         1. Patient's mechanically ventilated          2.  Managed by intensivist  ERNESTO Lung Volume Expansion Protocol indications:        1. Any patient at risk for pulmonary complications. VI. Maintenance:    F. Timely patient assessment is an integral part of this protocol therefore the following will be applied:  1. All non- critical care patients will be evaluated upon receiving initial respiratory care orders within 24 hours and re-evaluated within 48 hours (or more as needed). 2. Orders requesting a Respiratory Consult will be responded to in the following manner:  e. In patient emergency situations, the RCP assigned to the floor will respond immediately to the patient, provide an initial respiratory assessment, and contact the patient's physician as necessary for appropriate orders. f. In non-emergent situations, the RCP assigned to the floor will respond to the patient within 90 minutes and provide an initial respiratory assessment and contact patient's physician as necessary for appropriate orders. g. An RCP will provide a comprehensive assessment as soon as possible. 3. Upon completion of an evaluation, the RCP will complete documentation in the patient's EMR in the RT Assessment section. 4. The RCP who completes the assessment will document orders for therapy in the orders section of the patient's EMR selecting new order. Next, per protocol should be selected indicating it is a protocol order and sign orders should be selected to complete the process. The applicable protocol must be added to the progress note per Joint Commission guidelines. 5. The Pharmacy and Therapeutics (P&T) Committee has mandated that the medication Xopenex may be changed to unit dose albuterol without an order, except for those patients receiving Xopenex due to cardiac arrhythmias. 1. The dosage for these patients should be 0.63 mg. and may be changed from 1.25 mg. to 0.63 mg per P & T Committee by the RCP completing the assessment.   6. Patients who are not experiencing cardiac arrhythmias, and are ordered Xopenex and Atrovent may be changed to Duoneb. VII. Safety:        D. The following safety issues shall be monitored:  1. The RCP will perform hand hygiene per hospital policy utilizing Standard Precautions for all patients and following transmission-based isolation as indicated per hospital policy. 2. The RCP must exercise professional judgment in classifying the patient for frequency of therapy. 3. Appropriate classification of the patient will require an evaluation utilizing the Therapy Assessment Protocol Guidelines. 4. The RCP will confer with the physician concerning the care of the patient at any time questions or problems arise. 5. If during therapy, the patient exhibits no improvement, or deterioration in clinical status the RCP will notify the physician and the patient's nurse. VIII. Interventions:   A. The patient's nurse is responsible concerning all items related to his/her care. Ongoing communication with nursing is essential to successful protocol management. B. The RCP recognizes the value of the team approach in meeting the patient's needs. Nursing input regarding the patient's pulmonary condition will be sought as needed. IX. Reportable conditions: The RCP will inform the physician if:  1. There are acute changes in patient's respiratory status. 2. The therapist is unable to determine appropriate care plan upon assessment. 3. The patient fails to reach therapeutic objective. 4. A change or additional medication is needed. X.  Patient Education:    A. Patient will receive instruction on the followin. The treatment modality, including objectives and proper technique of therapy  2. Respiratory medications  B. Documentation shall occur in the patient education section of the patient's EMR. XI. Documentation: Record all findings as described above in the patient's EMR. Related Protocols: A. Aerosolized Medication Protocol  B. Bronchial Hygiene   C.  Oxygen Protocol   D. [facility] Fast Track Weaning Protocol  E. Asthma Treatment protocol ER  F. Alpha-1 antitrypsin Deficiency Protocol  G. Prone Positioning Protocol  H. Respiratory Care COPD Protocol  I. Home Oxygen assessment Protocol  J. Ventilator Weaning Protocols   K. Volume Expansion protocol    Indications      Frequency          Level  A. Aerosol therapy   1.  q4h     Severe SOB, wheezing, unable to sleep 1   2.  qid, q4 wa or q6h   Moderate SOB, wheezing   2   3.  tid      Hx of asthma, or COPD mild wheezing,         or facilitate secretion removal              3   4.  bid      Asthma, or COPD, Intermittent wheezing 4   5. PRN, i.e. tid PRN, qid PRN Asthma, or COPD, occasional wheezing 5       B. Bronchopulmonary Hygiene    1. q4h             Copious secretions, SOB, unable to sleep 1   2. qid & PRN            Moderate amounts of secretions   2   3. tid           Small amounts of secretions and poor cough,               history of secretions    3    4. PRN, i.e. tid PRN, qid PRN     Breathing exercises, encourage cough only 4      C. Oxygen Therapy     Follow hospital approved Oxygen Protocol      Note:  qid treatments are due 0800, 1200, 1600, and 2000. tid treatments are due 0800, 1400, and 2000  Q6h treatments are due 0800, 1400, 2000, 0200  Q4 wa teatments are due 0800, 1200, 1600, and 2000. Q4h treatments are due  0800, 1200, 1600, 2000, 0000, and 0400. The Level 1-5 rating system is only to be used as criteria for determining appropriate frequency of therapy. References:   N   Joint Commission Consolidated [place] Standard   L    Respiratory Care Department Policy, Procedure and Protocol Guideline Manual, 1995, JORGE LUIS RICHARDSON  Therapist Driven Respiratory Care Protocols  A Practitioner's Guide for Criteria-Based Respiratory Care by Liset Perea M.D., and JORGE LUIS Alcantara RRT. L  The rationale for therapist-driven protocols: an update.  Respiratory Care 1998; 94:708-462   L Therapist Driven Respiratory Care Protocols  A Practitioner's Guide for Criteria-Based Respiratory Care by Liset Perea M.D., and JORGE LUIS Alcantara RRT. L The rationale for therapist-driven protocols: an update. Respiratory Care 1998; [id number]. N   [facility] Clinical Practice Guidelines. A. The RCP will perform a respiratory assessment in the following manner:  1. Perform hand hygiene per hospital policy utilizing Standard Precautions for all patients and following transmission-based isolation as indicated per policy. 2. Identify patient via ID bracelet verifying patient name and birth date. 3. General observations: color, pattern and effort of breathing, chest expansion, (symmetrical and bilateral), level of consciousness and the ability to ambulate. 4. The RCP will assess patients cough ability and determine if Nasotracheal suctioning is needed. If patient is unable to produce sputum, at that time, the RCP should question the patient with regard to their sputum: production, color consistency, frequency and amount. 5. Auscultation: Using a stethoscope, the RCP will listen and note quality of breath sounds and presence or absence of adventitious breath sounds in all lung fields, both anteriorly and posteriorly. 6. Upon completing the EMR review and physical assessment, the RCP will document findings in the RT Assessment section of the EMR. The score level will be provided and will be used to determine the frequency of therapy. V. Indications:   A. Indications for Bronchial Hygiene Protocol will include:  1. Potential for or presence of atelectasis. 2. Need for hydration and removal of retained secretions. 3. Need for improvement of cough effectiveness. 4. Presence of conditions associated with disorder of pulmonary clearance:  e. Cystic fibrosis  f. Bronchiectasis  B. Indications for Aerosolized Medication(s) Protocol should include:  1. Treatment of bronchospasm/wheezing  2.  Improvement of mucociliary clearance  3. Treatment of stridor  4. History of Asthma or COPD             C.  Indications for Oxygen Therapy Protocol should include:  1. Documented hypoxemia  2. Severe trauma  3. Acute myocardial infarction  4. Short-term therapy (e.g. post anesthesia recovery)    VI. Maintenance:    A. Timely patient assessment is an integral part of this protocol therefore the following will be applied:  1. All non- critical care patients will be evaluated upon receiving initial respiratory care orders within 24 hours and re-evaluated within 48 hours (or more as needed). 2. Orders requesting a Respiratory Consult will be responded to in the following manner:  e. In patient emergency situations, the RCP assigned to the floor will respond immediately to the patient, provide an initial respiratory assessment, and contact the patients physician as necessary for appropriate orders. f. In non-emergent situations, the RCP assigned to the floor will respond to the patient within 90 minutes and provide an initial respiratory assessment and contact patients physician as necessary for appropriate orders. g. An RCP will provide a comprehensive assessment as soon as possible. 3. Upon completion of an evaluation, the RCP will complete documentation in the patients EMR in the RT Assessment section. 4. The RCP who completes the assessment will document orders for therapy in the orders section of the patients EMR selecting new order. Next, per protocol should be selected indicating it is a protocol order and sign orders should be selected to complete the process. 5. The Pharmacy and Therapeutics (P&T) Committee has mandated that the medication Xopenex may be changed to unit dose albuterol without an order, except for those patients receiving Xopenex due to cardiac arrhythmias.  The dosage for these patients should be 0.63 mg. and may be changed from 1.25 mg. to 0.63 mg per P & T Committee by the RCP completing the assessment. 6. Patients who are not experiencing cardiac arrhythmias, and are ordered Xopenex and Atrovent may be changed to Duoneb. VII. Safety:        A. The following safety issues shall be monitored:  1. The RCP will perform hand hygiene per hospital policy utilizing Standard Precautions for all patients and following transmission-based isolation as indicated per hospital policy. 2. The RCP must exercise professional judgment in classifying the patient for frequency of therapy. 3. Appropriate classification of the patient will require an evaluation utilizing the Therapy Assessment Protocol Guidelines. 4. The RCP will confer with the physician concerning the care of the patient at any time questions or problems arise. 5. If during therapy, the patient exhibits no improvement or deterioration in clinical status the RCP will notify the physician and the patients nurse. VIII. Interventions:   A. The patients nurse is responsible concerning all items related to his/her care. Ongoing communication with nursing is essential to successful protocol management. B. The RCP recognizes the value of the team approach in meeting the patients needs. Nursing input regarding the patients pulmonary condition will be sought as needed. IX. Reportable conditions: The RCP will inform the physician if:  1. There are acute changes in patients respiratory status. 2. The therapist is unable to determine appropriate care plan upon assessment. 3. The patient fails to reach therapeutic objective. 4. A change or additional medication is needed. X.  Patient Education:    A. Patient will receive instruction on the followin. The treatment modality, including objectives and proper technique of therapy  2. Respiratory medications  B. Documentation shall occur in the patient education section of the patients EMR. XI. Documentation: Record all findings as described above in the patients EMR.     Related Protocols: A. Aerosolized Medication Protocol  B. Bronchial Hygiene  C. Oxygen Protocol   D. Volume Expansion/Secretion Clearance  E. Ventilator Weaning Protocols    References:  N   Joint Commission Consolidated [place] Standard   L    Respiratory Care Department Policy, Procedure and Protocol Guideline Manual, 1995, JORGE LUIS RICHARDSON  Therapist Driven Respiratory Care Protocols  A Practitioners Guide for Criteria-Based Respiratory Care by Toney Chowdhury M.D., and JORGE LUIS Bowser RRT. L  The rationale for therapist-driven protocols: an update. Respiratory Care 1998; 1150 [facility] Guidelines. No

## (undated) DEVICE — CONTAINER PREFIL FRMLN 40ML --

## (undated) DEVICE — CONNECTOR TBNG OD5-7MM O2 END DISP

## (undated) DEVICE — SYR 5ML 1/5 GRAD LL NSAF LF --

## (undated) DEVICE — KENDALL RADIOLUCENT FOAM MONITORING ELECTRODE RECTANGULAR SHAPE: Brand: KENDALL

## (undated) DEVICE — SYR 3ML LL TIP 1/10ML GRAD --

## (undated) DEVICE — NDL PRT INJ NSAF BLNT 18GX1.5 --

## (undated) DEVICE — SNARE POLYP SM W13MMXL240CM SHTH DIA2.4MM OVL FLX DISP

## (undated) DEVICE — BLOCK BITE AD 60FR W/ VELC STRP ADDRESSES MOST PT AND

## (undated) DEVICE — CANNULA NSL ORAL AD FOR CAPNOFLEX CO2 O2 AIRLFE

## (undated) DEVICE — FORCEPS BX L240CM JAW DIA2.8MM L CAP W/ NDL MIC MESH TOOTH